# Patient Record
Sex: FEMALE | Race: WHITE | NOT HISPANIC OR LATINO | Employment: UNEMPLOYED | URBAN - METROPOLITAN AREA
[De-identification: names, ages, dates, MRNs, and addresses within clinical notes are randomized per-mention and may not be internally consistent; named-entity substitution may affect disease eponyms.]

---

## 2017-01-03 ENCOUNTER — ALLSCRIPTS OFFICE VISIT (OUTPATIENT)
Dept: OTHER | Facility: OTHER | Age: 32
End: 2017-01-03

## 2017-01-04 ENCOUNTER — ALLSCRIPTS OFFICE VISIT (OUTPATIENT)
Dept: OTHER | Facility: OTHER | Age: 32
End: 2017-01-04

## 2017-01-11 ENCOUNTER — ALLSCRIPTS OFFICE VISIT (OUTPATIENT)
Dept: OTHER | Facility: OTHER | Age: 32
End: 2017-01-11

## 2017-01-17 ENCOUNTER — ALLSCRIPTS OFFICE VISIT (OUTPATIENT)
Dept: OTHER | Facility: OTHER | Age: 32
End: 2017-01-17

## 2017-01-19 ENCOUNTER — ALLSCRIPTS OFFICE VISIT (OUTPATIENT)
Dept: OTHER | Facility: OTHER | Age: 32
End: 2017-01-19

## 2017-01-24 ENCOUNTER — ALLSCRIPTS OFFICE VISIT (OUTPATIENT)
Dept: OTHER | Facility: OTHER | Age: 32
End: 2017-01-24

## 2017-01-25 ENCOUNTER — ALLSCRIPTS OFFICE VISIT (OUTPATIENT)
Dept: OTHER | Facility: OTHER | Age: 32
End: 2017-01-25

## 2017-01-27 ENCOUNTER — ALLSCRIPTS OFFICE VISIT (OUTPATIENT)
Dept: OTHER | Facility: OTHER | Age: 32
End: 2017-01-27

## 2017-01-27 DIAGNOSIS — F31.31 BIPOLAR DISORDER, CURRENT EPISODE DEPRESSED, MILD (HCC): ICD-10-CM

## 2017-01-27 DIAGNOSIS — Z79.899 OTHER LONG TERM (CURRENT) DRUG THERAPY: ICD-10-CM

## 2017-01-30 ENCOUNTER — TRANSCRIBE ORDERS (OUTPATIENT)
Dept: LAB | Facility: MEDICAL CENTER | Age: 32
End: 2017-01-30

## 2017-01-30 ENCOUNTER — APPOINTMENT (OUTPATIENT)
Dept: LAB | Facility: MEDICAL CENTER | Age: 32
End: 2017-01-30
Payer: MEDICARE

## 2017-01-30 ENCOUNTER — GENERIC CONVERSION - ENCOUNTER (OUTPATIENT)
Dept: OTHER | Facility: OTHER | Age: 32
End: 2017-01-30

## 2017-01-30 DIAGNOSIS — F31.31 BIPOLAR DISORDER, CURRENT EPISODE DEPRESSED, MILD (HCC): ICD-10-CM

## 2017-01-30 DIAGNOSIS — Z79.899 OTHER LONG TERM (CURRENT) DRUG THERAPY: ICD-10-CM

## 2017-01-30 LAB
ANION GAP SERPL CALCULATED.3IONS-SCNC: 9 MMOL/L (ref 4–13)
BUN SERPL-MCNC: 10 MG/DL (ref 5–25)
CALCIUM SERPL-MCNC: 9.3 MG/DL (ref 8.3–10.1)
CHLORIDE SERPL-SCNC: 105 MMOL/L (ref 100–108)
CO2 SERPL-SCNC: 24 MMOL/L (ref 21–32)
CREAT SERPL-MCNC: 0.81 MG/DL (ref 0.6–1.3)
GFR SERPL CREATININE-BSD FRML MDRD: >60 ML/MIN/1.73SQ M
GLUCOSE SERPL-MCNC: 98 MG/DL (ref 65–140)
LITHIUM SERPL-SCNC: 0.6 MMOL/L (ref 0.5–1)
POTASSIUM SERPL-SCNC: 4.1 MMOL/L (ref 3.5–5.3)
SODIUM SERPL-SCNC: 138 MMOL/L (ref 136–145)

## 2017-01-30 PROCEDURE — 80048 BASIC METABOLIC PNL TOTAL CA: CPT

## 2017-01-30 PROCEDURE — 80178 ASSAY OF LITHIUM: CPT

## 2017-01-30 PROCEDURE — 36415 COLL VENOUS BLD VENIPUNCTURE: CPT

## 2017-01-31 ENCOUNTER — ALLSCRIPTS OFFICE VISIT (OUTPATIENT)
Dept: OTHER | Facility: OTHER | Age: 32
End: 2017-01-31

## 2017-02-02 ENCOUNTER — ALLSCRIPTS OFFICE VISIT (OUTPATIENT)
Dept: OTHER | Facility: OTHER | Age: 32
End: 2017-02-02

## 2017-02-02 ENCOUNTER — GENERIC CONVERSION - ENCOUNTER (OUTPATIENT)
Dept: OTHER | Facility: OTHER | Age: 32
End: 2017-02-02

## 2017-02-07 ENCOUNTER — ALLSCRIPTS OFFICE VISIT (OUTPATIENT)
Dept: OTHER | Facility: OTHER | Age: 32
End: 2017-02-07

## 2017-02-10 ENCOUNTER — ALLSCRIPTS OFFICE VISIT (OUTPATIENT)
Dept: OTHER | Facility: OTHER | Age: 32
End: 2017-02-10

## 2017-02-13 ENCOUNTER — GENERIC CONVERSION - ENCOUNTER (OUTPATIENT)
Dept: OTHER | Facility: OTHER | Age: 32
End: 2017-02-13

## 2017-02-14 ENCOUNTER — ALLSCRIPTS OFFICE VISIT (OUTPATIENT)
Dept: OTHER | Facility: OTHER | Age: 32
End: 2017-02-14

## 2017-02-16 ENCOUNTER — ALLSCRIPTS OFFICE VISIT (OUTPATIENT)
Dept: OTHER | Facility: OTHER | Age: 32
End: 2017-02-16

## 2017-02-21 ENCOUNTER — ALLSCRIPTS OFFICE VISIT (OUTPATIENT)
Dept: OTHER | Facility: OTHER | Age: 32
End: 2017-02-21

## 2017-02-23 ENCOUNTER — ALLSCRIPTS OFFICE VISIT (OUTPATIENT)
Dept: OTHER | Facility: OTHER | Age: 32
End: 2017-02-23

## 2017-02-24 ENCOUNTER — ALLSCRIPTS OFFICE VISIT (OUTPATIENT)
Dept: FAMILY MEDICINE CLINIC | Facility: CLINIC | Age: 32
End: 2017-02-24
Payer: MEDICARE

## 2017-02-24 LAB — HBA1C MFR BLD HPLC: 5.4 %

## 2017-02-24 PROCEDURE — 99213 OFFICE O/P EST LOW 20 MIN: CPT | Performed by: PHYSICIAN ASSISTANT

## 2017-03-02 ENCOUNTER — ALLSCRIPTS OFFICE VISIT (OUTPATIENT)
Dept: OTHER | Facility: OTHER | Age: 32
End: 2017-03-02

## 2017-03-03 ENCOUNTER — ALLSCRIPTS OFFICE VISIT (OUTPATIENT)
Dept: OTHER | Facility: OTHER | Age: 32
End: 2017-03-03

## 2017-03-07 ENCOUNTER — ALLSCRIPTS OFFICE VISIT (OUTPATIENT)
Dept: OTHER | Facility: OTHER | Age: 32
End: 2017-03-07

## 2017-03-08 ENCOUNTER — ALLSCRIPTS OFFICE VISIT (OUTPATIENT)
Dept: OTHER | Facility: OTHER | Age: 32
End: 2017-03-08

## 2017-03-10 DIAGNOSIS — Z79.899 OTHER LONG TERM (CURRENT) DRUG THERAPY: ICD-10-CM

## 2017-03-10 DIAGNOSIS — F31.62 BIPOLAR DISORDER, CURRENT EPISODE MIXED, MODERATE (HCC): ICD-10-CM

## 2017-03-10 DIAGNOSIS — R05.9 COUGH: ICD-10-CM

## 2017-03-13 ENCOUNTER — APPOINTMENT (OUTPATIENT)
Dept: LAB | Facility: MEDICAL CENTER | Age: 32
End: 2017-03-13
Payer: MEDICARE

## 2017-03-13 ENCOUNTER — TRANSCRIBE ORDERS (OUTPATIENT)
Dept: LAB | Facility: MEDICAL CENTER | Age: 32
End: 2017-03-13

## 2017-03-13 DIAGNOSIS — F31.62 BIPOLAR DISORDER, CURRENT EPISODE MIXED, MODERATE (HCC): ICD-10-CM

## 2017-03-13 DIAGNOSIS — Z79.899 OTHER LONG TERM (CURRENT) DRUG THERAPY: ICD-10-CM

## 2017-03-13 LAB — LITHIUM SERPL-SCNC: 0.6 MMOL/L (ref 0.5–1)

## 2017-03-13 PROCEDURE — 80178 ASSAY OF LITHIUM: CPT

## 2017-03-13 PROCEDURE — 36415 COLL VENOUS BLD VENIPUNCTURE: CPT

## 2017-03-16 ENCOUNTER — ALLSCRIPTS OFFICE VISIT (OUTPATIENT)
Dept: OTHER | Facility: OTHER | Age: 32
End: 2017-03-16

## 2017-03-21 ENCOUNTER — ALLSCRIPTS OFFICE VISIT (OUTPATIENT)
Dept: OTHER | Facility: OTHER | Age: 32
End: 2017-03-21

## 2017-03-23 ENCOUNTER — ALLSCRIPTS OFFICE VISIT (OUTPATIENT)
Dept: OTHER | Facility: OTHER | Age: 32
End: 2017-03-23

## 2017-03-28 ENCOUNTER — ALLSCRIPTS OFFICE VISIT (OUTPATIENT)
Dept: OTHER | Facility: OTHER | Age: 32
End: 2017-03-28

## 2017-03-28 ENCOUNTER — OFFICE VISIT (OUTPATIENT)
Dept: URGENT CARE | Facility: CLINIC | Age: 32
End: 2017-03-28
Payer: MEDICARE

## 2017-03-28 PROCEDURE — 81002 URINALYSIS NONAUTO W/O SCOPE: CPT

## 2017-03-28 PROCEDURE — G0463 HOSPITAL OUTPT CLINIC VISIT: HCPCS

## 2017-03-28 PROCEDURE — 99214 OFFICE O/P EST MOD 30 MIN: CPT

## 2017-03-30 ENCOUNTER — ALLSCRIPTS OFFICE VISIT (OUTPATIENT)
Dept: OTHER | Facility: OTHER | Age: 32
End: 2017-03-30

## 2017-04-04 ENCOUNTER — ALLSCRIPTS OFFICE VISIT (OUTPATIENT)
Dept: OTHER | Facility: OTHER | Age: 32
End: 2017-04-04

## 2017-04-05 ENCOUNTER — OFFICE VISIT (OUTPATIENT)
Dept: URGENT CARE | Facility: CLINIC | Age: 32
End: 2017-04-05
Payer: MEDICARE

## 2017-04-05 ENCOUNTER — HOSPITAL ENCOUNTER (OUTPATIENT)
Dept: RADIOLOGY | Facility: CLINIC | Age: 32
Discharge: HOME | End: 2017-04-05
Admitting: EMERGENCY MEDICINE
Payer: MEDICARE

## 2017-04-05 DIAGNOSIS — R05.9 COUGH: ICD-10-CM

## 2017-04-05 PROCEDURE — G0463 HOSPITAL OUTPT CLINIC VISIT: HCPCS

## 2017-04-05 PROCEDURE — 99213 OFFICE O/P EST LOW 20 MIN: CPT

## 2017-04-05 PROCEDURE — 71020 HB CHEST X-RAY 2VW FRONTAL&LATL: CPT

## 2017-04-10 ENCOUNTER — ALLSCRIPTS OFFICE VISIT (OUTPATIENT)
Dept: OTHER | Facility: OTHER | Age: 32
End: 2017-04-10

## 2017-04-11 ENCOUNTER — ALLSCRIPTS OFFICE VISIT (OUTPATIENT)
Dept: OTHER | Facility: OTHER | Age: 32
End: 2017-04-11

## 2017-04-17 ENCOUNTER — GENERIC CONVERSION - ENCOUNTER (OUTPATIENT)
Dept: PSYCHIATRY | Facility: CLINIC | Age: 32
End: 2017-04-17

## 2017-04-18 ENCOUNTER — ALLSCRIPTS OFFICE VISIT (OUTPATIENT)
Dept: OTHER | Facility: OTHER | Age: 32
End: 2017-04-18

## 2017-04-20 ENCOUNTER — ALLSCRIPTS OFFICE VISIT (OUTPATIENT)
Dept: OTHER | Facility: OTHER | Age: 32
End: 2017-04-20

## 2017-04-25 ENCOUNTER — ALLSCRIPTS OFFICE VISIT (OUTPATIENT)
Dept: OTHER | Facility: OTHER | Age: 32
End: 2017-04-25

## 2017-04-26 ENCOUNTER — GENERIC CONVERSION - ENCOUNTER (OUTPATIENT)
Dept: OTHER | Facility: OTHER | Age: 32
End: 2017-04-26

## 2017-04-28 ENCOUNTER — ALLSCRIPTS OFFICE VISIT (OUTPATIENT)
Dept: OTHER | Facility: OTHER | Age: 32
End: 2017-04-28

## 2017-05-02 ENCOUNTER — ALLSCRIPTS OFFICE VISIT (OUTPATIENT)
Dept: OTHER | Facility: OTHER | Age: 32
End: 2017-05-02

## 2017-05-05 ENCOUNTER — ALLSCRIPTS OFFICE VISIT (OUTPATIENT)
Dept: OTHER | Facility: OTHER | Age: 32
End: 2017-05-05

## 2017-05-09 ENCOUNTER — ALLSCRIPTS OFFICE VISIT (OUTPATIENT)
Dept: OTHER | Facility: OTHER | Age: 32
End: 2017-05-09

## 2017-05-10 ENCOUNTER — GENERIC CONVERSION - ENCOUNTER (OUTPATIENT)
Dept: OTHER | Facility: OTHER | Age: 32
End: 2017-05-10

## 2017-05-10 ENCOUNTER — ALLSCRIPTS OFFICE VISIT (OUTPATIENT)
Dept: OTHER | Facility: OTHER | Age: 32
End: 2017-05-10

## 2017-05-11 ENCOUNTER — OFFICE VISIT (OUTPATIENT)
Dept: URGENT CARE | Facility: CLINIC | Age: 32
End: 2017-05-11
Payer: MEDICARE

## 2017-05-11 PROCEDURE — G0463 HOSPITAL OUTPT CLINIC VISIT: HCPCS

## 2017-05-11 PROCEDURE — 99214 OFFICE O/P EST MOD 30 MIN: CPT

## 2017-05-12 ENCOUNTER — GENERIC CONVERSION - ENCOUNTER (OUTPATIENT)
Dept: OTHER | Facility: OTHER | Age: 32
End: 2017-05-12

## 2017-05-16 ENCOUNTER — ALLSCRIPTS OFFICE VISIT (OUTPATIENT)
Dept: OTHER | Facility: OTHER | Age: 32
End: 2017-05-16

## 2017-05-18 ENCOUNTER — ALLSCRIPTS OFFICE VISIT (OUTPATIENT)
Dept: OTHER | Facility: OTHER | Age: 32
End: 2017-05-18

## 2017-05-22 ENCOUNTER — GENERIC CONVERSION - ENCOUNTER (OUTPATIENT)
Dept: OTHER | Facility: OTHER | Age: 32
End: 2017-05-22

## 2017-05-23 ENCOUNTER — ALLSCRIPTS OFFICE VISIT (OUTPATIENT)
Dept: OTHER | Facility: OTHER | Age: 32
End: 2017-05-23

## 2017-05-24 ENCOUNTER — ALLSCRIPTS OFFICE VISIT (OUTPATIENT)
Dept: OTHER | Facility: OTHER | Age: 32
End: 2017-05-24

## 2017-05-25 ENCOUNTER — ALLSCRIPTS OFFICE VISIT (OUTPATIENT)
Dept: OTHER | Facility: OTHER | Age: 32
End: 2017-05-25

## 2017-05-30 ENCOUNTER — ALLSCRIPTS OFFICE VISIT (OUTPATIENT)
Dept: OTHER | Facility: OTHER | Age: 32
End: 2017-05-30

## 2017-06-01 ENCOUNTER — ALLSCRIPTS OFFICE VISIT (OUTPATIENT)
Dept: OTHER | Facility: OTHER | Age: 32
End: 2017-06-01

## 2017-06-06 ENCOUNTER — ALLSCRIPTS OFFICE VISIT (OUTPATIENT)
Dept: OTHER | Facility: OTHER | Age: 32
End: 2017-06-06

## 2017-06-08 ENCOUNTER — ALLSCRIPTS OFFICE VISIT (OUTPATIENT)
Dept: OTHER | Facility: OTHER | Age: 32
End: 2017-06-08

## 2017-06-12 ENCOUNTER — ALLSCRIPTS OFFICE VISIT (OUTPATIENT)
Dept: OTHER | Facility: OTHER | Age: 32
End: 2017-06-12

## 2017-06-13 ENCOUNTER — ALLSCRIPTS OFFICE VISIT (OUTPATIENT)
Dept: OTHER | Facility: OTHER | Age: 32
End: 2017-06-13

## 2017-06-15 ENCOUNTER — ALLSCRIPTS OFFICE VISIT (OUTPATIENT)
Dept: OTHER | Facility: OTHER | Age: 32
End: 2017-06-15

## 2017-06-20 ENCOUNTER — ALLSCRIPTS OFFICE VISIT (OUTPATIENT)
Dept: OTHER | Facility: OTHER | Age: 32
End: 2017-06-20

## 2017-06-22 ENCOUNTER — ALLSCRIPTS OFFICE VISIT (OUTPATIENT)
Dept: OTHER | Facility: OTHER | Age: 32
End: 2017-06-22

## 2017-06-26 ENCOUNTER — ALLSCRIPTS OFFICE VISIT (OUTPATIENT)
Dept: FAMILY MEDICINE CLINIC | Facility: CLINIC | Age: 32
End: 2017-06-26
Payer: MEDICARE

## 2017-06-26 LAB — HBA1C MFR BLD HPLC: 5.3 %

## 2017-06-26 PROCEDURE — 99213 OFFICE O/P EST LOW 20 MIN: CPT | Performed by: PHYSICIAN ASSISTANT

## 2017-06-26 PROCEDURE — 83036 HEMOGLOBIN GLYCOSYLATED A1C: CPT | Performed by: PHYSICIAN ASSISTANT

## 2017-06-27 ENCOUNTER — ALLSCRIPTS OFFICE VISIT (OUTPATIENT)
Dept: OTHER | Facility: OTHER | Age: 32
End: 2017-06-27

## 2017-07-06 ENCOUNTER — ALLSCRIPTS OFFICE VISIT (OUTPATIENT)
Dept: OTHER | Facility: OTHER | Age: 32
End: 2017-07-06

## 2017-07-07 ENCOUNTER — GENERIC CONVERSION - ENCOUNTER (OUTPATIENT)
Dept: OTHER | Facility: OTHER | Age: 32
End: 2017-07-07

## 2017-07-10 ENCOUNTER — GENERIC CONVERSION - ENCOUNTER (OUTPATIENT)
Dept: OTHER | Facility: OTHER | Age: 32
End: 2017-07-10

## 2017-07-11 ENCOUNTER — ALLSCRIPTS OFFICE VISIT (OUTPATIENT)
Dept: OTHER | Facility: OTHER | Age: 32
End: 2017-07-11

## 2017-07-13 ENCOUNTER — ALLSCRIPTS OFFICE VISIT (OUTPATIENT)
Dept: OTHER | Facility: OTHER | Age: 32
End: 2017-07-13

## 2017-07-18 ENCOUNTER — ALLSCRIPTS OFFICE VISIT (OUTPATIENT)
Dept: OTHER | Facility: OTHER | Age: 32
End: 2017-07-18

## 2017-07-20 ENCOUNTER — ALLSCRIPTS OFFICE VISIT (OUTPATIENT)
Dept: OTHER | Facility: OTHER | Age: 32
End: 2017-07-20

## 2017-07-21 ENCOUNTER — ALLSCRIPTS OFFICE VISIT (OUTPATIENT)
Dept: OTHER | Facility: OTHER | Age: 32
End: 2017-07-21

## 2017-07-25 ENCOUNTER — ALLSCRIPTS OFFICE VISIT (OUTPATIENT)
Dept: OTHER | Facility: OTHER | Age: 32
End: 2017-07-25

## 2017-07-27 ENCOUNTER — GENERIC CONVERSION - ENCOUNTER (OUTPATIENT)
Dept: OTHER | Facility: OTHER | Age: 32
End: 2017-07-27

## 2017-07-27 ENCOUNTER — ALLSCRIPTS OFFICE VISIT (OUTPATIENT)
Dept: OTHER | Facility: OTHER | Age: 32
End: 2017-07-27

## 2017-07-27 DIAGNOSIS — F31.32 BIPOLAR DISORDER, CURRENT EPISODE DEPRESSED, MODERATE (HCC): ICD-10-CM

## 2017-08-01 ENCOUNTER — ALLSCRIPTS OFFICE VISIT (OUTPATIENT)
Dept: OTHER | Facility: OTHER | Age: 32
End: 2017-08-01

## 2017-08-02 DIAGNOSIS — E55.9 VITAMIN D DEFICIENCY: ICD-10-CM

## 2017-08-02 DIAGNOSIS — N39.0 URINARY TRACT INFECTION: ICD-10-CM

## 2017-08-02 DIAGNOSIS — R73.09 OTHER ABNORMAL GLUCOSE: ICD-10-CM

## 2017-08-02 DIAGNOSIS — I10 ESSENTIAL (PRIMARY) HYPERTENSION: ICD-10-CM

## 2017-08-03 ENCOUNTER — APPOINTMENT (OUTPATIENT)
Dept: LAB | Facility: MEDICAL CENTER | Age: 32
End: 2017-08-03
Payer: MEDICARE

## 2017-08-03 ENCOUNTER — TRANSCRIBE ORDERS (OUTPATIENT)
Dept: LAB | Facility: MEDICAL CENTER | Age: 32
End: 2017-08-03

## 2017-08-03 DIAGNOSIS — F31.32 BIPOLAR DISORDER, CURRENT EPISODE DEPRESSED, MODERATE (HCC): ICD-10-CM

## 2017-08-03 DIAGNOSIS — E55.9 VITAMIN D DEFICIENCY: ICD-10-CM

## 2017-08-03 DIAGNOSIS — R73.09 OTHER ABNORMAL GLUCOSE: Primary | ICD-10-CM

## 2017-08-03 DIAGNOSIS — I10 ESSENTIAL (PRIMARY) HYPERTENSION: ICD-10-CM

## 2017-08-03 LAB
25(OH)D3 SERPL-MCNC: 42.3 NG/ML (ref 30–100)
ALBUMIN SERPL BCP-MCNC: 3.5 G/DL (ref 3.5–5)
ALP SERPL-CCNC: 78 U/L (ref 46–116)
ALT SERPL W P-5'-P-CCNC: 23 U/L (ref 12–78)
ANION GAP SERPL CALCULATED.3IONS-SCNC: 6 MMOL/L (ref 4–13)
AST SERPL W P-5'-P-CCNC: 14 U/L (ref 5–45)
BACTERIA UR QL AUTO: ABNORMAL /HPF
BASOPHILS # BLD AUTO: 0.04 THOUSANDS/ΜL (ref 0–0.1)
BASOPHILS NFR BLD AUTO: 0 % (ref 0–1)
BILIRUB SERPL-MCNC: 0.29 MG/DL (ref 0.2–1)
BILIRUB UR QL STRIP: NEGATIVE
BUN SERPL-MCNC: 13 MG/DL (ref 5–25)
CALCIUM SERPL-MCNC: 9.5 MG/DL (ref 8.3–10.1)
CHLORIDE SERPL-SCNC: 107 MMOL/L (ref 100–108)
CHOLEST SERPL-MCNC: 157 MG/DL (ref 50–200)
CLARITY UR: CLEAR
CO2 SERPL-SCNC: 26 MMOL/L (ref 21–32)
COLOR UR: YELLOW
CREAT SERPL-MCNC: 0.92 MG/DL (ref 0.6–1.3)
EOSINOPHIL # BLD AUTO: 0.54 THOUSAND/ΜL (ref 0–0.61)
EOSINOPHIL NFR BLD AUTO: 5 % (ref 0–6)
ERYTHROCYTE [DISTWIDTH] IN BLOOD BY AUTOMATED COUNT: 15 % (ref 11.6–15.1)
GFR SERPL CREATININE-BSD FRML MDRD: 83 ML/MIN/1.73SQ M
GLUCOSE P FAST SERPL-MCNC: 102 MG/DL (ref 65–99)
GLUCOSE UR STRIP-MCNC: NEGATIVE MG/DL
HCT VFR BLD AUTO: 39.8 % (ref 34.8–46.1)
HDLC SERPL-MCNC: 48 MG/DL (ref 40–60)
HGB BLD-MCNC: 12.2 G/DL (ref 11.5–15.4)
HGB UR QL STRIP.AUTO: NEGATIVE
HYALINE CASTS #/AREA URNS LPF: ABNORMAL /LPF
INSULIN SERPL-ACNC: 22.5 MU/L (ref 3–25)
KETONES UR STRIP-MCNC: NEGATIVE MG/DL
LDLC SERPL CALC-MCNC: 84 MG/DL (ref 0–100)
LEUKOCYTE ESTERASE UR QL STRIP: ABNORMAL
LITHIUM SERPL-SCNC: 0.6 MMOL/L (ref 0.5–1)
LYMPHOCYTES # BLD AUTO: 3.03 THOUSANDS/ΜL (ref 0.6–4.47)
LYMPHOCYTES NFR BLD AUTO: 25 % (ref 14–44)
MCH RBC QN AUTO: 26.6 PG (ref 26.8–34.3)
MCHC RBC AUTO-ENTMCNC: 30.7 G/DL (ref 31.4–37.4)
MCV RBC AUTO: 87 FL (ref 82–98)
MONOCYTES # BLD AUTO: 0.74 THOUSAND/ΜL (ref 0.17–1.22)
MONOCYTES NFR BLD AUTO: 6 % (ref 4–12)
NEUTROPHILS # BLD AUTO: 7.71 THOUSANDS/ΜL (ref 1.85–7.62)
NEUTS SEG NFR BLD AUTO: 64 % (ref 43–75)
NITRITE UR QL STRIP: NEGATIVE
NON-SQ EPI CELLS URNS QL MICRO: ABNORMAL /HPF
NRBC BLD AUTO-RTO: 0 /100 WBCS
PH UR STRIP.AUTO: 6.5 [PH] (ref 4.5–8)
PLATELET # BLD AUTO: 360 THOUSANDS/UL (ref 149–390)
PMV BLD AUTO: 10.1 FL (ref 8.9–12.7)
POTASSIUM SERPL-SCNC: 4 MMOL/L (ref 3.5–5.3)
PROT SERPL-MCNC: 7 G/DL (ref 6.4–8.2)
PROT UR STRIP-MCNC: NEGATIVE MG/DL
RBC # BLD AUTO: 4.59 MILLION/UL (ref 3.81–5.12)
RBC #/AREA URNS AUTO: ABNORMAL /HPF
SODIUM SERPL-SCNC: 139 MMOL/L (ref 136–145)
SP GR UR STRIP.AUTO: 1.02 (ref 1–1.03)
T3 SERPL-MCNC: 1 NG/ML (ref 0.6–1.8)
T4 FREE SERPL-MCNC: 1 NG/DL (ref 0.76–1.46)
TRIGL SERPL-MCNC: 123 MG/DL
TSH SERPL DL<=0.05 MIU/L-ACNC: 3.5 UIU/ML (ref 0.36–3.74)
UROBILINOGEN UR QL STRIP.AUTO: 0.2 E.U./DL
WBC # BLD AUTO: 12.1 THOUSAND/UL (ref 4.31–10.16)
WBC #/AREA URNS AUTO: ABNORMAL /HPF

## 2017-08-03 PROCEDURE — 84480 ASSAY TRIIODOTHYRONINE (T3): CPT

## 2017-08-03 PROCEDURE — 84443 ASSAY THYROID STIM HORMONE: CPT

## 2017-08-03 PROCEDURE — 83525 ASSAY OF INSULIN: CPT

## 2017-08-03 PROCEDURE — 80053 COMPREHEN METABOLIC PANEL: CPT

## 2017-08-03 PROCEDURE — 81001 URINALYSIS AUTO W/SCOPE: CPT

## 2017-08-03 PROCEDURE — 80178 ASSAY OF LITHIUM: CPT

## 2017-08-03 PROCEDURE — 80061 LIPID PANEL: CPT

## 2017-08-03 PROCEDURE — 85025 COMPLETE CBC W/AUTO DIFF WBC: CPT

## 2017-08-03 PROCEDURE — 84439 ASSAY OF FREE THYROXINE: CPT

## 2017-08-03 PROCEDURE — 82306 VITAMIN D 25 HYDROXY: CPT

## 2017-08-03 PROCEDURE — 36415 COLL VENOUS BLD VENIPUNCTURE: CPT

## 2017-08-04 ENCOUNTER — ALLSCRIPTS OFFICE VISIT (OUTPATIENT)
Dept: OTHER | Facility: OTHER | Age: 32
End: 2017-08-04

## 2017-08-08 ENCOUNTER — ALLSCRIPTS OFFICE VISIT (OUTPATIENT)
Dept: OTHER | Facility: OTHER | Age: 32
End: 2017-08-08

## 2017-08-10 ENCOUNTER — ALLSCRIPTS OFFICE VISIT (OUTPATIENT)
Dept: OTHER | Facility: OTHER | Age: 32
End: 2017-08-10

## 2017-08-15 ENCOUNTER — ALLSCRIPTS OFFICE VISIT (OUTPATIENT)
Dept: OTHER | Facility: OTHER | Age: 32
End: 2017-08-15

## 2017-08-15 ENCOUNTER — APPOINTMENT (OUTPATIENT)
Dept: LAB | Facility: HOSPITAL | Age: 32
End: 2017-08-15
Payer: MEDICARE

## 2017-08-15 DIAGNOSIS — N39.0 URINARY TRACT INFECTION: ICD-10-CM

## 2017-08-15 LAB
BILIRUB UR QL STRIP: NEGATIVE
CLARITY UR: NORMAL
COLOR UR: YELLOW
GLUCOSE UR STRIP-MCNC: NEGATIVE MG/DL
HGB UR QL STRIP.AUTO: NEGATIVE
KETONES UR STRIP-MCNC: NEGATIVE MG/DL
LEUKOCYTE ESTERASE UR QL STRIP: NEGATIVE
NITRITE UR QL STRIP: NEGATIVE
PH UR STRIP.AUTO: 5.5 [PH] (ref 4.5–8)
PROT UR STRIP-MCNC: NEGATIVE MG/DL
SP GR UR STRIP.AUTO: 1.02 (ref 1–1.03)
UROBILINOGEN UR QL STRIP.AUTO: 0.2 E.U./DL

## 2017-08-15 PROCEDURE — 81003 URINALYSIS AUTO W/O SCOPE: CPT

## 2017-08-16 ENCOUNTER — ALLSCRIPTS OFFICE VISIT (OUTPATIENT)
Dept: FAMILY MEDICINE CLINIC | Facility: CLINIC | Age: 32
End: 2017-08-16
Payer: MEDICARE

## 2017-08-16 PROCEDURE — 99213 OFFICE O/P EST LOW 20 MIN: CPT | Performed by: FAMILY MEDICINE

## 2017-08-18 ENCOUNTER — ALLSCRIPTS OFFICE VISIT (OUTPATIENT)
Dept: OTHER | Facility: OTHER | Age: 32
End: 2017-08-18

## 2017-08-22 ENCOUNTER — ALLSCRIPTS OFFICE VISIT (OUTPATIENT)
Dept: OTHER | Facility: OTHER | Age: 32
End: 2017-08-22

## 2017-08-24 ENCOUNTER — ALLSCRIPTS OFFICE VISIT (OUTPATIENT)
Dept: OTHER | Facility: OTHER | Age: 32
End: 2017-08-24

## 2017-08-29 ENCOUNTER — ALLSCRIPTS OFFICE VISIT (OUTPATIENT)
Dept: OTHER | Facility: OTHER | Age: 32
End: 2017-08-29

## 2017-08-30 ENCOUNTER — ALLSCRIPTS OFFICE VISIT (OUTPATIENT)
Dept: FAMILY MEDICINE CLINIC | Facility: CLINIC | Age: 32
End: 2017-08-30
Payer: MEDICARE

## 2017-08-30 ENCOUNTER — GENERIC CONVERSION - ENCOUNTER (OUTPATIENT)
Dept: OTHER | Facility: OTHER | Age: 32
End: 2017-08-30

## 2017-08-31 ENCOUNTER — GENERIC CONVERSION - ENCOUNTER (OUTPATIENT)
Dept: OTHER | Facility: OTHER | Age: 32
End: 2017-08-31

## 2017-08-31 ENCOUNTER — ALLSCRIPTS OFFICE VISIT (OUTPATIENT)
Dept: OTHER | Facility: OTHER | Age: 32
End: 2017-08-31

## 2017-09-05 ENCOUNTER — ALLSCRIPTS OFFICE VISIT (OUTPATIENT)
Dept: OTHER | Facility: OTHER | Age: 32
End: 2017-09-05

## 2017-09-07 ENCOUNTER — ALLSCRIPTS OFFICE VISIT (OUTPATIENT)
Dept: OTHER | Facility: OTHER | Age: 32
End: 2017-09-07

## 2017-09-11 ENCOUNTER — ALLSCRIPTS OFFICE VISIT (OUTPATIENT)
Dept: OTHER | Facility: OTHER | Age: 32
End: 2017-09-11

## 2017-09-12 ENCOUNTER — ALLSCRIPTS OFFICE VISIT (OUTPATIENT)
Dept: OTHER | Facility: OTHER | Age: 32
End: 2017-09-12

## 2017-09-14 ENCOUNTER — ALLSCRIPTS OFFICE VISIT (OUTPATIENT)
Dept: OTHER | Facility: OTHER | Age: 32
End: 2017-09-14

## 2017-09-19 ENCOUNTER — GENERIC CONVERSION - ENCOUNTER (OUTPATIENT)
Dept: OTHER | Facility: OTHER | Age: 32
End: 2017-09-19

## 2017-09-22 ENCOUNTER — ALLSCRIPTS OFFICE VISIT (OUTPATIENT)
Dept: OTHER | Facility: OTHER | Age: 32
End: 2017-09-22

## 2017-09-26 ENCOUNTER — ALLSCRIPTS OFFICE VISIT (OUTPATIENT)
Dept: OTHER | Facility: OTHER | Age: 32
End: 2017-09-26

## 2017-10-03 ENCOUNTER — ALLSCRIPTS OFFICE VISIT (OUTPATIENT)
Dept: OTHER | Facility: OTHER | Age: 32
End: 2017-10-03

## 2017-10-05 ENCOUNTER — ALLSCRIPTS OFFICE VISIT (OUTPATIENT)
Dept: OTHER | Facility: OTHER | Age: 32
End: 2017-10-05

## 2017-10-12 ENCOUNTER — ALLSCRIPTS OFFICE VISIT (OUTPATIENT)
Dept: OTHER | Facility: OTHER | Age: 32
End: 2017-10-12

## 2017-10-17 ENCOUNTER — ALLSCRIPTS OFFICE VISIT (OUTPATIENT)
Dept: OTHER | Facility: OTHER | Age: 32
End: 2017-10-17

## 2017-10-19 ENCOUNTER — GENERIC CONVERSION - ENCOUNTER (OUTPATIENT)
Dept: OTHER | Facility: OTHER | Age: 32
End: 2017-10-19

## 2017-10-19 ENCOUNTER — ALLSCRIPTS OFFICE VISIT (OUTPATIENT)
Dept: OTHER | Facility: OTHER | Age: 32
End: 2017-10-19

## 2017-10-24 ENCOUNTER — ALLSCRIPTS OFFICE VISIT (OUTPATIENT)
Dept: OTHER | Facility: OTHER | Age: 32
End: 2017-10-24

## 2017-10-26 ENCOUNTER — ALLSCRIPTS OFFICE VISIT (OUTPATIENT)
Dept: OTHER | Facility: OTHER | Age: 32
End: 2017-10-26

## 2017-10-30 ENCOUNTER — ALLSCRIPTS OFFICE VISIT (OUTPATIENT)
Dept: FAMILY MEDICINE CLINIC | Facility: CLINIC | Age: 32
End: 2017-10-30
Payer: MEDICARE

## 2017-10-30 LAB — HBA1C MFR BLD HPLC: 5.5 %

## 2017-10-30 PROCEDURE — 99213 OFFICE O/P EST LOW 20 MIN: CPT | Performed by: PHYSICIAN ASSISTANT

## 2017-10-31 ENCOUNTER — ALLSCRIPTS OFFICE VISIT (OUTPATIENT)
Dept: OTHER | Facility: OTHER | Age: 32
End: 2017-10-31

## 2017-10-31 NOTE — PROGRESS NOTES
Assessment  1  Other abnormal glucose (790 29) (R73 09)   2  Need for immunization against influenza (V04 81) (Z23)   3  Morbid or severe obesity due to excess calories (278 01) (E66 01)    Plan  Long term current use of therapeutic drug    · Hemoglobin A1c- POC; Status:Complete - Retrospective Authorization;   Done:  16SZF4326 09:07AM  Morbid or severe obesity due to excess calories    · Follow-up visit in 4 Months Evaluation and Treatment  Follow-up  Status: Hold For -  Scheduling  Requested for: 28LLZ5656  Need for immunization against influenza    · Stop: Influenza  Other abnormal glucose    · Hemoglobin A1c- POC; Status:Canceled;    · Hemoglobin A1c- POC; Status:Canceled; Discussion/Summary    Discussed that weight loss meds are not appropriate at this time  Psych issues must be resolved first  HGBA1C is 5 5% today  4 months or sooner if needed  The treatment plan was reviewed with the patient/guardian  The patient/guardian understands and agrees with the treatment plan      Chief Complaint  Patient is here for regular check up and would like to discuss alternative weight loss medications  History of Present Illness  33 yo female presents for above  She has been very depressed and started [address] instead of ECT treatments  She reports her OCD has been out of control  now has a new Psych MD  He has adjusted her meds  is also interested in a different weight loss med  Review of Systems    Constitutional: No fever, no chills, feels well, no tiredness, no recent weight gain or weight loss  Eyes: No complaints of eye pain, no red eyes, no eyesight problems, no discharge, no dry eyes, no itching of eyes  ENT: no complaints of earache, no loss of hearing, no nose bleeds, no nasal discharge, no sore throat, no hoarseness  Cardiovascular: No complaints of slow heart rate, no fast heart rate, no chest pain, no palpitations, no leg claudication, no lower extremity edema     Respiratory: No complaints of shortness of breath, no wheezing, no cough, no SOB on exertion, no orthopnea, no PND  Psychiatric: as noted in HPI  Active Problems  1  Abnormal weight gain (783 1) (R63 5)   2  Allergic rhinitis (477 9) (J30 9)   3  Amenorrhea (626 0) (N91 2)   4  Binge-eating disorder, severe (783 6) (F50 81)   5  Bulging lumbar disc (722 10) (M51 26)   6  Dysuria (788 1) (R30 0)   7  Esophageal reflux (530 81) (K21 9)   8  Foot pain (729 5) (M79 673)   9  EILEEN (generalized anxiety disorder) (300 02) (F41 1)   10  Gambling disorder, moderate (312 31) (F63 0)   11  Hypertension (401 9) (I10)   12  Hypothyroidism (244 9) (E03 9)   13  Long term current use of therapeutic drug (V58 69) (Z79 899)   14  Lumbago (724 2) (M54 5)   15  Lumbar degenerative disc disease (722 52) (M51 36)   16  Morbid or severe obesity due to excess calories (278 01) (E66 01)   17  Nocturnal enuresis (788 36) (N39 44)   18  Obsessive-compulsive disorder (300 3) (F42 9)   19  Other abnormal glucose (790 29) (R73 09)   20  Overactive bladder (596 51) (N32 81)   21  PTSD (post-traumatic stress disorder) (309 81) (F43 10)   22  RLS (restless legs syndrome) (333 94) (G25 81)   23  Severe bipolar I disorder, most recent episode depressed (296 53) (F31 4)   24  Urgency incontinence (788 31) (N39 41)   25  Urinary incontinence, unspecified type (788 30) (R32)   26  Urinary tract infection, site unspecified (599 0) (N39 0)   27  Vitamin D deficiency (268 9) (E55 9)    Past Medical History  1  History of Abscess of face (682 0) (L02 01)   2  History of Acute diverticulitis (562 11) (K57 92)   3  History of Acute frontal sinusitis (461 1) (J01 10)   4  History of Acute nonsuppurative otitis media, unspecified laterality (381 00) (H65 199)   5  History of Acute pain (338 19) (R52)   6  History of Anorexia nervosa in remission (307 1) (F50 00)   7  History of Backache (724 5) (M54 9)   8  History of Candidiasis, cutaneous (112 3) (B37 2)   9   History of Dog bite (879 8,E906 0) (W54  0XXA)   10  History of Dysfunction of left eustachian tube (381 81) (H69 82)   11  History of Dysuria (788 1) (R30 0)   12  History of acute bronchitis (V12 69) (Z87 09)   13  History of acute sinusitis (V12 69) (Z87 09)   14  History of bronchitis (V12 69) (Z87 09)   15  History of cough   16  Denied: History of head injury   17  History of Pseudotumor cerebri (348 2) (G93 2)   18  Denied: History of Seizures   19  History of Suicide and self-inflicted injury (C158 8) (R13 0IUI)    The active problems and past medical history were reviewed and updated today  Surgical History  1  History of Sinus Surgery   2  History of Tonsillectomy    The surgical history was reviewed and updated today  Family History  Mother    1  Family history of bicuspid aortic valve (V17 49) (Z82 79)   2  Family history of depression (V17 0) (Z81 8)   3  Family history of Hypertension (V17 49)   4  Family history of Status post aortic valve replacement  Father    5  Family history of Hypertension (V17 49)   6  Family history of Hypothyroidism   7  No family history of mental disorder  Brother    8  Family history of Hypertension (V17 49)  Maternal Grandmother    9  Family history of Cancer   10  Family history of cerebrovascular accident (V17 1) (Z82 3)   11  Family history of Heart Disease (V17 49)  Paternal Grandmother    15  Family history of Cancer   13  Family history of malignant neoplasm (V16 9) (Z80 9)  Maternal Grandfather    15  Family history of pneumonia (V18 8) (Z83 1)  Paternal Grandfather    13  Family history of pneumonia (V18 8) (Z83 1)  Family History    12  Family history of Arthritis (716 90) (M19 90)   17  Family history of Breast Cancer (V16 3)   18  Family history of osteoporosis (V17 81) (Z82 62)   19  Family history of Hypertension (V17 49)   20  Family history of Transient Ischemic Attack    The family history was reviewed and updated today         Social History   · Caffeine Use   · Currently sexually active   · Disabled   · Former smoker (V62 79) (H37 623)   · Has no children   · High school graduate   · Lives with parents   · Never smoked   · No alcohol use   · Single   · Tea  The social history was reviewed and updated today  The social history was reviewed and is unchanged  Current Meds   1  HydrOXYzine HCl - 50 MG Oral Tablet; TAKE 1 1/2 TO 2 TABLETS AT BEDTIME AS   NEEDED FOR INSOMNIA; Therapy: 72TCA1651 to (Evaluate:12Aug2017)  Requested for: 07NWS6858; Last   Rx:13Jun2017 Ordered   2  LamoTRIgine 200 MG Oral Tablet; TAKE 2 TABLETS AT BEDTIME; Therapy: 10YYM9777 to ([phone])  Requested for: 59Sax2866; Last   Rx:85Vsq5791 Ordered   3  Latuda 120 MG Oral Tablet; TAKE 1 TABLET IN THE EVENING WITH FOOD; Therapy: 85Exq9504 to [phone])  Requested for: 20KET1316; Last   Rx:13Xip5970 Ordered   4  Levocetirizine Dihydrochloride 5 MG Oral Tablet; TAKE 1 TABLET BY MOUTH ONCE A   DAY; Therapy: 13Fdz5128 to (Evaluate:16Nov2017)  Requested for: 03Bsu6930; Last   Rx:15Jql0233 Ordered   5  Levothyroxine Sodium 100 MCG Oral Tablet; take 1 tablet by mouth every day; Therapy: 84VGG3665 to (Kathryn Mendoza)  Requested for: 44Wag0244; Last   Rx:19Bco2716 Ordered   6  Lisinopril 10 MG Oral Tablet; TAKE 1 TABLET BY MOUTH EVERY DAY FOR BLOOD   PRESSURE; Therapy: 66HBJ7514 to ([phone])  Requested for: 28Bye5246; Last   Rx:76Zty1080 Ordered   7  Lithium Carbonate  MG Oral Tablet Extended Release; take 2 tablets at bedtime; Therapy: 24GTY9104 to (Evaluate:09Jan2018)  Requested for: 31Twh3984; Last   Rx:21Prt0669 Ordered   8  LORazepam 0 5 MG Oral Tablet; Take 1 tablet 3 times daily  as needed; Therapy: 30Jnd1608 to (Evaluate:10Nov2017); Last Rx:67Olc3159 Ordered   9  Myrbetriq 50 MG Oral Tablet Extended Release 24 Hour; Take one tablet daily; Therapy: 14KBZ9897 to (Last Rx:62Udo0258)  Requested for: 18Okk9787 Ordered   10   Neurontin 600 MG Oral Tablet; TAKE1 5 TABS DAILY; Therapy: (Recorded:19Nrl0356) to Recorded   11  Omeprazole 20 MG Oral Capsule Delayed Release; take 1 capsule daily as needed; Therapy: 45PSC7871 to ([phone])  Requested for: 15ZGQ2282; Last    Rx:17Lnt8489 Ordered   12  ROPINIRole HCl - 3 MG Oral Tablet; Take 1 tab BID; Therapy: 16Nwn3394 to (Last Rx:25Hqo1819)  Requested for: 55Yae1094 Ordered   13  Sertraline HCl - 100 MG Oral Tablet; TAKE 2 TABLET Every morning; Therapy: (Recorded:20Obk6886) to Recorded    The medication list was reviewed and updated today  Allergies  1  No Known Drug Allergies  2  Seasonal    Vitals  Vital Signs    Recorded: 65AQB1885 08:46AM   Temperature 96 8 F    Heart Rate 78    Systolic 823    Diastolic 82    Height 5 ft 8 in    Patient Refused Weight Yes Yes   O2 Saturation 98      Physical Exam    Constitutional   General appearance: Abnormal   morbidly obese  Eyes   Conjunctiva and lids: No swelling, erythema or discharge  Pupils and irises: Equal, round and reactive to light  Ears, Nose, Mouth, and Throat   External inspection of ears and nose: Normal     Pulmonary   Respiratory effort: No increased work of breathing or signs of respiratory distress  Auscultation of lungs: Clear to auscultation  Cardiovascular   Palpation of heart: Normal PMI, no thrills  Auscultation of heart: Normal rate and rhythm, normal S1 and S2, without murmurs  Examination of extremities for edema and/or varicosities: Normal     Lymphatic   Palpation of lymph nodes in neck: No lymphadenopathy  Skin   Skin and subcutaneous tissue: Normal without rashes or lesions  Neurologic   Cranial nerves: Cranial nerves 2-12 intact      Psychiatric   Orientation to person, place, and time: Normal     Mood and affect: Normal          Results/Data  Hemoglobin A1c- POC 17CCJ8056 09:07AM [facility]     Test Name Result Flag Reference   HEMOGLOBIN A1C 5 5         Future Appointments    Date/Time Provider Specialty Site   11/14/2017 03:00 PM Nurse, Urology Diagnostic  [facility] [place]   12/12/2017 03:00 PM Nurse, Urology Diagnostic  [facility]   10/31/2017 05:00 PM Qasim Pineda [place]   11/02/2017 01:00 PM Qasim Pineda, [address] [facility]   11/07/2017 05:00 PM Qasim Pineda, [address] [facility]   11/09/2017 09:00 AM Qasim Pineda, [address] [facility]   11/14/2017 05:00 PM Qasim Pineda, [address] [facility]   11/15/2017 01:00 PM Qasim Pineda, [address] [facility]   11/21/2017 05:00 PM Qasim Pineda, [address] [facility]   11/28/2017 05:00 PM Qasim Pineda, [address] [facility]   11/30/2017 09:00 AM Qasim Pineda, [address] [facility]   12/05/2017 05:00 PM Qasim Pineda [place]   12/12/2017 05:00 PM Sharon Pineda [facility]   12/14/2017 09:00 AM Qasim Pineda, [address] [facility]   12/19/2017 01:00 PM Qasim Honeycutt, [place]     Signatures   Electronically signed by :  Jennifer Staley, [facility]; Oct 30 2017  9:06AM EST                       (Author)    Electronically signed by : Hema Bojorquez MD; Oct 30 2017  9:09AM EST                       (Author)

## 2017-11-02 ENCOUNTER — ALLSCRIPTS OFFICE VISIT (OUTPATIENT)
Dept: OTHER | Facility: OTHER | Age: 32
End: 2017-11-02

## 2017-11-07 ENCOUNTER — ALLSCRIPTS OFFICE VISIT (OUTPATIENT)
Dept: OTHER | Facility: OTHER | Age: 32
End: 2017-11-07

## 2017-11-09 ENCOUNTER — ALLSCRIPTS OFFICE VISIT (OUTPATIENT)
Dept: OTHER | Facility: OTHER | Age: 32
End: 2017-11-09

## 2017-11-14 ENCOUNTER — ALLSCRIPTS OFFICE VISIT (OUTPATIENT)
Dept: OTHER | Facility: OTHER | Age: 32
End: 2017-11-14

## 2017-11-15 ENCOUNTER — ALLSCRIPTS OFFICE VISIT (OUTPATIENT)
Dept: OTHER | Facility: OTHER | Age: 32
End: 2017-11-15

## 2017-11-21 ENCOUNTER — ALLSCRIPTS OFFICE VISIT (OUTPATIENT)
Dept: OTHER | Facility: OTHER | Age: 32
End: 2017-11-21

## 2017-11-22 ENCOUNTER — ALLSCRIPTS OFFICE VISIT (OUTPATIENT)
Dept: OTHER | Facility: OTHER | Age: 32
End: 2017-11-22

## 2017-11-28 ENCOUNTER — ALLSCRIPTS OFFICE VISIT (OUTPATIENT)
Dept: OTHER | Facility: OTHER | Age: 32
End: 2017-11-28

## 2017-11-29 ENCOUNTER — ALLSCRIPTS OFFICE VISIT (OUTPATIENT)
Dept: OTHER | Facility: OTHER | Age: 32
End: 2017-11-29

## 2017-12-05 ENCOUNTER — ALLSCRIPTS OFFICE VISIT (OUTPATIENT)
Dept: OTHER | Facility: OTHER | Age: 32
End: 2017-12-05

## 2017-12-08 ENCOUNTER — ALLSCRIPTS OFFICE VISIT (OUTPATIENT)
Dept: OTHER | Facility: OTHER | Age: 32
End: 2017-12-08

## 2017-12-12 ENCOUNTER — ALLSCRIPTS OFFICE VISIT (OUTPATIENT)
Dept: OTHER | Facility: OTHER | Age: 32
End: 2017-12-12

## 2017-12-13 ENCOUNTER — ALLSCRIPTS OFFICE VISIT (OUTPATIENT)
Dept: OTHER | Facility: OTHER | Age: 32
End: 2017-12-13

## 2017-12-19 ENCOUNTER — ALLSCRIPTS OFFICE VISIT (OUTPATIENT)
Dept: OTHER | Facility: OTHER | Age: 32
End: 2017-12-19

## 2017-12-22 ENCOUNTER — GENERIC CONVERSION - ENCOUNTER (OUTPATIENT)
Dept: UROLOGY | Facility: AMBULATORY SURGERY CENTER | Age: 32
End: 2017-12-22

## 2017-12-27 ENCOUNTER — ALLSCRIPTS OFFICE VISIT (OUTPATIENT)
Dept: OTHER | Facility: OTHER | Age: 32
End: 2017-12-27

## 2017-12-30 ENCOUNTER — GENERIC CONVERSION - ENCOUNTER (OUTPATIENT)
Dept: UROLOGY | Facility: AMBULATORY SURGERY CENTER | Age: 32
End: 2017-12-30

## 2017-12-31 ENCOUNTER — OFFICE VISIT (OUTPATIENT)
Dept: URGENT CARE | Age: 32
End: 2017-12-31
Payer: MEDICARE

## 2017-12-31 ENCOUNTER — APPOINTMENT (OUTPATIENT)
Dept: RADIOLOGY | Age: 32
End: 2017-12-31
Attending: PHYSICIAN ASSISTANT
Payer: MEDICARE

## 2017-12-31 ENCOUNTER — TRANSCRIBE ORDERS (OUTPATIENT)
Dept: URGENT CARE | Age: 32
End: 2017-12-31

## 2017-12-31 DIAGNOSIS — M79.673 PAIN OF FOOT: ICD-10-CM

## 2017-12-31 PROCEDURE — 73630 X-RAY EXAM OF FOOT: CPT

## 2017-12-31 PROCEDURE — G0463 HOSPITAL OUTPT CLINIC VISIT: HCPCS | Performed by: FAMILY MEDICINE

## 2017-12-31 PROCEDURE — 99213 OFFICE O/P EST LOW 20 MIN: CPT | Performed by: FAMILY MEDICINE

## 2018-01-01 NOTE — PROGRESS NOTES
Assessment   1  Left foot infection (686 9) (L08 9)   2  Fracture of left toe with routine healing (V54 19) (O32 091O)    Plan   Foot pain    · * XR FOOT 3+ VIEW LEFT; Status:Active; Requested for:20Iwk0036;   Fracture of left toe with routine healing, Left foot infection    · Ciprofloxacin HCl - 500 MG Oral Tablet; TAKE 1 TABLET TWICE DAILY   · Continue with our present treatment plan ; Status:Complete;   Done: 97FVO7984   · Resume activity to your tolerance ; Status:Complete;   Done: 29SFB0053    Discussion/Summary   Discussion Summary:    If you have any increased redness or swelling go to emergency room for further evaluation  May do warm water and Epsom salt soaks frequently  Medication Side Effects Reviewed: Possible side effects of new medications were reviewed with the patient/guardian today  Understands and agrees with treatment plan: The treatment plan was reviewed with the patient/guardian  The patient/guardian understands and agrees with the treatment plan    Counseling Documentation With Imm: The patient was counseled regarding instructions for management,-- prognosis,-- patient and family education,-- impressions,-- risks and benefits of treatment options,-- importance of compliance with treatment  Follow Up Instructions: Follow Up with your Primary Care Provider in 3-4 days  If your symptoms worsen, go to the nearest [place] [address] Emergency Department  Chief Complaint   1  Foot Problem  Chief Complaint Free Text Note Form: Dec 4 she was moving a table it flipped and landed on her left foot injuring her left little toe- the toe is red and swollen the pain has not gotten better since the incident      History of Present Illness   HPI: December 4th patient had a table land on her left toe  She did not get checked out yet  It has been fellow better but now it is more red and swollen  Foot Problem: PRICE GONZALEZ presents with complaints of foot problem        Associated symptoms include foot pain-- and-- foot swelling, but-- no foot numbness-- and-- no foot skin lesions  Review of Systems   Focused-Female:      Constitutional: as noted in HPI  Musculoskeletal: as noted in HPI  Integumentary: as noted in HPI  Active Problems   1  Abnormal weight gain (783 1) (R63 5)   2  Allergic rhinitis (477 9) (J30 9)   3  Amenorrhea (626 0) (N91 2)   4  Binge-eating disorder, severe (783 6) (F50 81)   5  Bulging lumbar disc (722 10) (M51 26)   6  Dysuria (788 1) (R30 0)   7  Esophageal reflux (530 81) (K21 9)   8  Foot pain (729 5) (M79 673)   9  EILEEN (generalized anxiety disorder) (300 02) (F41 1)   10  Gambling disorder, moderate (312 31) (F63 0)   11  Hypertension (401 9) (I10)   12  Hypothyroidism (244 9) (E03 9)   13  Long term current use of therapeutic drug (V58 69) (Z79 899)   14  Lumbago (724 2) (M54 5)   15  Lumbar degenerative disc disease (722 52) (M51 36)   16  Morbid or severe obesity due to excess calories (278 01) (E66 01)   17  Need for immunization against influenza (V04 81) (Z23)   18  Nocturnal enuresis (788 36) (N39 44)   19  Obsessive-compulsive disorder (300 3) (F42 9)   20  Other abnormal glucose (790 29) (R73 09)   21  Overactive bladder (596 51) (N32 81)   22  PTSD (post-traumatic stress disorder) (309 81) (F43 10)   23  RLS (restless legs syndrome) (333 94) (G25 81)   24  Severe bipolar I disorder, most recent episode depressed (296 53) (F31 4)   25  Urgency incontinence (788 31) (N39 41)   26  Urinary incontinence, unspecified type (788 30) (R32)   27  Urinary tract infection, site unspecified (599 0) (N39 0)   28  Vitamin D deficiency (268 9) (E55 9)    Past Medical History   1  History of Abscess of face (682 0) (L02 01)   2  History of Acute diverticulitis (562 11) (K57 92)   3  History of Acute frontal sinusitis (461 1) (J01 10)   4  History of Acute nonsuppurative otitis media, unspecified laterality (381 00) (H65 199)   5   History of Acute pain (338 19) (R52)   6  History of Anorexia nervosa in remission (307 1) (F50 00)   7  History of Backache (724 5) (M54 9)   8  History of Candidiasis, cutaneous (112 3) (B37 2)   9  History of Dog bite (879 8,E906 0) (W54  0XXA)   10  History of Dysfunction of left eustachian tube (381 81) (H69 82)   11  History of Dysuria (788 1) (R30 0)   12  History of acute bronchitis (V12 69) (Z87 09)   13  History of acute sinusitis (V12 69) (Z87 09)   14  History of bronchitis (V12 69) (Z87 09)   15  History of cough   16  Denied: History of head injury   17  History of Pseudotumor cerebri (348 2) (G93 2)   18  Denied: History of Seizures   19  History of Suicide and self-inflicted injury (S641 8) (Q36 9VIY)  Active Problems And Past Medical History Reviewed: The active problems and past medical history were reviewed and updated today  Family History   Mother    1  Family history of bicuspid aortic valve (V17 49) (Z82 79)   2  Family history of depression (V17 0) (Z81 8)   3  Family history of Hypertension (V17 49)   4  Family history of Status post aortic valve replacement  Father    5  Family history of Hypertension (V17 49)   6  Family history of Hypothyroidism   7  No family history of mental disorder  Brother    8  Family history of Hypertension (V17 49)  Maternal Grandmother    9  Family history of Cancer   10  Family history of cerebrovascular accident (V17 1) (Z82 3)   11  Family history of Heart Disease (V17 49)  Paternal Grandmother    15  Family history of Cancer   13  Family history of malignant neoplasm (V16 9) (Z80 9)  Maternal Grandfather    15  Family history of pneumonia (V18 8) (Z83 1)  Paternal Grandfather    13  Family history of pneumonia (V18 8) (Z83 1)  Family History    12  Family history of Arthritis (716 90) (M19 90)   17  Family history of Breast Cancer (V16 3)   18  Family history of osteoporosis (V17 81) (Z82 62)   19  Family history of Hypertension (V17 49)   20   Family history of Transient Ischemic Attack  Family History Reviewed: The family history was reviewed and updated today  Social History    · Caffeine Use   · Currently sexually active   · Disabled   · Former smoker (J77 69) (X12 255)   · Has no children   · High school graduate   · Lives with parents   · Never smoked   · No alcohol use   · Single   · Tea  Social History Reviewed: The social history was reviewed and updated today  Surgical History   1  History of Sinus Surgery   2  History of Tonsillectomy  Surgical History Reviewed: The surgical history was reviewed and updated today  Current Meds    1  HydrOXYzine HCl - 50 MG Oral Tablet; TAKE 1 1/2 TO 2 TABLETS AT BEDTIME AS     NEEDED FOR INSOMNIA; Therapy: 42TMW2316 to (Evaluate:12Aug2017)  Requested for: 95MSB0237; Last     Rx:13Jun2017 Ordered   2  LamoTRIgine 200 MG Oral Tablet; TAKE 2 TABLETS AT BEDTIME; Therapy: 01EXV3139 to (186 0124)  Requested for: 31Urg8702; Last     Rx:58Swf4530 Ordered   3  Latuda 120 MG Oral Tablet; TAKE 1 TABLET IN THE EVENING WITH FOOD; Therapy: 50Sel3929 to [phone])  Requested for: 61AMA4491; Last     Rx:33Elj2559 Ordered   4  Levocetirizine Dihydrochloride 5 MG Oral Tablet; TAKE 1 TABLET BY MOUTH ONCE A     DAY; Therapy: 79Rab0924 to (Evaluate:15Jan2018)  Requested for: 04FNH8099; Last     Rx:16Nov2017 Ordered   5  Levothyroxine Sodium 100 MCG Oral Tablet; take 1 tablet by mouth every day; Therapy: 91OQA8071 to (Alison Cushing)  Requested for: 58Zxu5536; Last     Rx:22Csp6609 Ordered   6  Lisinopril 10 MG Oral Tablet; TAKE 1 TABLET BY MOUTH EVERY DAY FOR BLOOD     PRESSURE; Therapy: 13BMX4046 to )  Requested for: 30Sri5215; Last     Rx:85Hsc3701 Ordered   7  Lithium Carbonate  MG Oral Tablet Extended Release; take 2 tablets at bedtime; Therapy: 47YYG7254 to (Evaluate:09Jan2018)  Requested for: 42Ulo5870; Last     Rx:61Kgm1102 Ordered   8   LORazepam 0 5 MG Oral Tablet; Take 1 tablet 3 times daily  as needed; Therapy: 09Nqk8718 to (Evaluate:10Nov2017); Last Rx:54Grz6607 Ordered   9  Myrbetriq 50 MG Oral Tablet Extended Release 24 Hour; Take one tablet daily; Therapy: 99OGT7919 to (Last Rx:32Usi2113)  Requested for: 25Iai1010 Ordered   10  Neurontin 600 MG Oral Tablet; TAKE1 5 TABS DAILY; Therapy: (Recorded:30Oct2017) to Recorded   11  Omeprazole 20 MG Oral Capsule Delayed Release; take 1 capsule daily as needed; Therapy: 98QYD0925 to (Lele Li)  Requested for: 22CSP3109; Last      Rx:39Kpf4205 Ordered   12  ROPINIRole HCl - 3 MG Oral Tablet; take 1 tablet by mouth twice a day; Therapy: 71Qia6516 to (Evaluate:25Apr2018)  Requested for: 91Tfz0731; Last      Rx:94Lxc4527 Ordered   13  Sertraline HCl - 100 MG Oral Tablet; TAKE 2 TABLET Every morning; Therapy: (Recorded:30Oct2017) to Recorded  Medication List Reviewed: The medication list was reviewed and updated today  Allergies   1  No Known Drug Allergies  2  Seasonal    Vitals   Signs   Recorded: 31Dec2017 08:34AM   Temperature: 97 8 F  Heart Rate: 74  Respiration: 18  Systolic: 564  Diastolic: 72  Height: 5 ft 8 in  Weight: 377 lb   BMI Calculated: 57 32  BSA Calculated: 2 68  O2 Saturation: 98  LMP: 61Nfk2699  Pain Scale: 7    Physical Exam        Constitutional      General appearance: No acute distress, well appearing and well nourished  Musculoskeletal Left 5th toe is slightly swollen and reddened  The erythema does not extend past the MTP joint  No significant tenderness  Neurologic      Sensation: No sensory loss  Psychiatric      Orientation to person, place, and time: Normal        Mood and affect: Normal        Results/Data   Diagnostic Studies Reviewed: I personally reviewed the films/images/results in the office today  My interpretation follows  X-ray Review Small old healing fracture the distal 5th toe        Future Appointments Date/Time Provider Specialty Site   01/11/2018 01:45 PM PAUL Hargrove   Urology [facility]   01/18/2018 09:30 AM LEIGHA Reveles Nurse Practitioner [facility]   01/02/2018 05:00  [address], [address], [facility]   01/03/2018 06:00  [address], [address], [facility]   01/08/2018 01:00  [address], [address], [address] [facility]   01/09/2018 05:00  [address], [address], [facility]   01/16/2018 05:00  [address], [address], [address] [facility]   01/19/2018 09:00  [address], [address], [address] [facility]   01/23/2018 05:00  [address], [address], [address] [facility]   01/25/2018 11:00  [address], [address], [address] [facility]   01/30/2018 05:00  [address], [address], [facility]   02/01/2018 11:00  [address], [address], [address] [facility]   02/09/2018 10:00  [address], [address], [address] [facility]   02/16/2018 10:00  [address], [address], [address] [facility]   03/02/2018 08:00  [address] [place] [address], [address], [address] [facility]   03/08/2018 11:00  [address], [address], [address] [facility]   03/15/2018 01:00 PM Jerry Batres [facility]   03/22/2018 11:00  [address], [address], [address] [facility]   02/26/2018 08:45 AM Remberto Charles, [address] [place] [address]     Signatures    Electronically signed by : Berta Frias, [facility]; Dec 31 2017  9:02AM EST                       (Author)     Electronically signed by : Rosmery Larsen DO; Dec 31 2017  2:45PM EST                       (Co-author)

## 2018-01-02 ENCOUNTER — ALLSCRIPTS OFFICE VISIT (OUTPATIENT)
Dept: OTHER | Facility: OTHER | Age: 33
End: 2018-01-02

## 2018-01-03 ENCOUNTER — ALLSCRIPTS OFFICE VISIT (OUTPATIENT)
Dept: OTHER | Facility: OTHER | Age: 33
End: 2018-01-03

## 2018-01-04 ENCOUNTER — GENERIC CONVERSION - ENCOUNTER (OUTPATIENT)
Dept: OTHER | Facility: OTHER | Age: 33
End: 2018-01-04

## 2018-01-06 ENCOUNTER — GENERIC CONVERSION - ENCOUNTER (OUTPATIENT)
Dept: UROLOGY | Facility: AMBULATORY SURGERY CENTER | Age: 33
End: 2018-01-06

## 2018-01-08 ENCOUNTER — ALLSCRIPTS OFFICE VISIT (OUTPATIENT)
Dept: OTHER | Facility: OTHER | Age: 33
End: 2018-01-08

## 2018-01-08 ENCOUNTER — HOSPITAL ENCOUNTER (INPATIENT)
Facility: HOSPITAL | Age: 33
LOS: 3 days | Discharge: HOME | DRG: 885 | End: 2018-01-11
Attending: EMERGENCY MEDICINE | Admitting: PSYCHIATRY & NEUROLOGY
Payer: MEDICARE

## 2018-01-08 DIAGNOSIS — K21.9 GASTROESOPHAGEAL REFLUX DISEASE WITHOUT ESOPHAGITIS: ICD-10-CM

## 2018-01-08 DIAGNOSIS — M54.50 LOW BACK PAIN: ICD-10-CM

## 2018-01-08 DIAGNOSIS — G25.81 RESTLESS LEG SYNDROME: ICD-10-CM

## 2018-01-08 DIAGNOSIS — F32.A DEPRESSION: ICD-10-CM

## 2018-01-08 DIAGNOSIS — D72.829 LEUKOCYTOSIS, UNSPECIFIED TYPE: ICD-10-CM

## 2018-01-08 DIAGNOSIS — F42.2 MIXED OBSESSIONAL THOUGHTS AND ACTS: ICD-10-CM

## 2018-01-08 DIAGNOSIS — M25.552 PAIN IN LEFT HIP: ICD-10-CM

## 2018-01-08 DIAGNOSIS — I10 HTN (HYPERTENSION): ICD-10-CM

## 2018-01-08 DIAGNOSIS — R52 PAIN: ICD-10-CM

## 2018-01-08 DIAGNOSIS — F31.4 BIPOLAR 1 DISORDER, DEPRESSED, SEVERE (HCC): Primary | ICD-10-CM

## 2018-01-08 DIAGNOSIS — N31.9 BLADDER DYSFUNCTION: ICD-10-CM

## 2018-01-08 DIAGNOSIS — M51.26 OTHER INTERVERTEBRAL DISC DISPLACEMENT, LUMBAR REGION: ICD-10-CM

## 2018-01-08 DIAGNOSIS — E03.9 HYPOTHYROIDISM, UNSPECIFIED TYPE: ICD-10-CM

## 2018-01-08 LAB
AMPHETAMINES SERPL QL SCN: NEGATIVE
BARBITURATES UR QL: NEGATIVE
BENZODIAZ UR QL: NEGATIVE
COCAINE UR QL: NEGATIVE
ETHANOL EXG-MCNC: 0 MG/DL
METHADONE UR QL: NEGATIVE
OPIATES UR QL SCN: NEGATIVE
PCP UR QL: NEGATIVE
THC UR QL: NEGATIVE

## 2018-01-08 PROCEDURE — 80307 DRUG TEST PRSMV CHEM ANLYZR: CPT | Performed by: EMERGENCY MEDICINE

## 2018-01-08 PROCEDURE — 99285 EMERGENCY DEPT VISIT HI MDM: CPT

## 2018-01-08 PROCEDURE — 82075 ASSAY OF BREATH ETHANOL: CPT | Performed by: EMERGENCY MEDICINE

## 2018-01-08 RX ORDER — LEVOTHYROXINE SODIUM 0.1 MG/1
100 TABLET ORAL
Status: DISCONTINUED | OUTPATIENT
Start: 2018-01-09 | End: 2018-01-11 | Stop reason: HOSPADM

## 2018-01-08 RX ORDER — HYDROXYZINE HYDROCHLORIDE 25 MG/1
25 TABLET, FILM COATED ORAL EVERY 4 HOURS PRN
Status: DISCONTINUED | OUTPATIENT
Start: 2018-01-08 | End: 2018-01-11 | Stop reason: HOSPADM

## 2018-01-08 RX ORDER — ZIPRASIDONE HYDROCHLORIDE 20 MG/1
20 CAPSULE ORAL EVERY 4 HOURS PRN
Status: DISCONTINUED | OUTPATIENT
Start: 2018-01-08 | End: 2018-01-11 | Stop reason: HOSPADM

## 2018-01-08 RX ORDER — LORAZEPAM 1 MG/1
1 TABLET ORAL EVERY 4 HOURS PRN
Status: DISCONTINUED | OUTPATIENT
Start: 2018-01-08 | End: 2018-01-11 | Stop reason: HOSPADM

## 2018-01-08 RX ORDER — TRAMADOL HYDROCHLORIDE 50 MG/1
50 TABLET ORAL EVERY 6 HOURS PRN
Status: DISCONTINUED | OUTPATIENT
Start: 2018-01-08 | End: 2018-01-11 | Stop reason: HOSPADM

## 2018-01-08 RX ORDER — LEVOCETIRIZINE DIHYDROCHLORIDE 5 MG/1
5 TABLET, FILM COATED ORAL EVERY EVENING
COMMUNITY
End: 2018-03-22 | Stop reason: SDUPTHER

## 2018-01-08 RX ORDER — ROPINIROLE 1 MG/1
3 TABLET, FILM COATED ORAL EVERY MORNING
Status: DISCONTINUED | OUTPATIENT
Start: 2018-01-09 | End: 2018-01-11 | Stop reason: HOSPADM

## 2018-01-08 RX ORDER — TRAZODONE HYDROCHLORIDE 50 MG/1
50 TABLET ORAL
Status: DISCONTINUED | OUTPATIENT
Start: 2018-01-08 | End: 2018-01-11 | Stop reason: HOSPADM

## 2018-01-08 RX ORDER — LAMOTRIGINE 100 MG/1
200 TABLET ORAL DAILY
Status: DISCONTINUED | OUTPATIENT
Start: 2018-01-09 | End: 2018-01-09

## 2018-01-08 RX ORDER — ZIPRASIDONE HYDROCHLORIDE 20 MG/1
20 CAPSULE ORAL EVERY 4 HOURS PRN
Status: DISCONTINUED | OUTPATIENT
Start: 2018-01-08 | End: 2018-01-08

## 2018-01-08 RX ORDER — OMEPRAZOLE 20 MG/1
20 CAPSULE, DELAYED RELEASE ORAL DAILY
COMMUNITY
End: 2018-01-11 | Stop reason: HOSPADM

## 2018-01-08 RX ORDER — IBUPROFEN 600 MG/1
600 TABLET ORAL EVERY 8 HOURS PRN
Status: DISCONTINUED | OUTPATIENT
Start: 2018-01-08 | End: 2018-01-11 | Stop reason: HOSPADM

## 2018-01-08 RX ORDER — LISINOPRIL 10 MG/1
10 TABLET ORAL DAILY
Status: DISCONTINUED | OUTPATIENT
Start: 2018-01-09 | End: 2018-01-11 | Stop reason: HOSPADM

## 2018-01-08 RX ORDER — BENZTROPINE MESYLATE 1 MG/1
1 TABLET ORAL
Status: DISCONTINUED | OUTPATIENT
Start: 2018-01-08 | End: 2018-01-11 | Stop reason: HOSPADM

## 2018-01-08 RX ORDER — LISINOPRIL 10 MG/1
10 TABLET ORAL DAILY
Status: ON HOLD | COMMUNITY
End: 2018-01-11

## 2018-01-08 RX ORDER — LITHIUM CARBONATE 300 MG/1
900 CAPSULE ORAL
Status: DISCONTINUED | OUTPATIENT
Start: 2018-01-08 | End: 2018-01-11 | Stop reason: HOSPADM

## 2018-01-08 RX ORDER — GABAPENTIN 300 MG/1
900 CAPSULE ORAL
Status: DISCONTINUED | OUTPATIENT
Start: 2018-01-08 | End: 2018-01-10

## 2018-01-08 RX ORDER — HALOPERIDOL 5 MG/ML
5 INJECTION INTRAMUSCULAR EVERY 6 HOURS PRN
Status: DISCONTINUED | OUTPATIENT
Start: 2018-01-08 | End: 2018-01-11 | Stop reason: HOSPADM

## 2018-01-08 RX ORDER — LORAZEPAM 2 MG/ML
2 INJECTION INTRAMUSCULAR EVERY 4 HOURS PRN
Status: DISCONTINUED | OUTPATIENT
Start: 2018-01-08 | End: 2018-01-11 | Stop reason: HOSPADM

## 2018-01-08 RX ORDER — GABAPENTIN 300 MG/1
900 CAPSULE ORAL
Status: ON HOLD | COMMUNITY
End: 2018-01-11

## 2018-01-08 RX ORDER — SERTRALINE HYDROCHLORIDE 100 MG/1
200 TABLET, FILM COATED ORAL DAILY
Status: ON HOLD | COMMUNITY
End: 2018-01-11

## 2018-01-08 RX ORDER — ROPINIROLE 3 MG/1
3 TABLET, FILM COATED ORAL 3 TIMES DAILY
COMMUNITY
End: 2018-01-11 | Stop reason: HOSPADM

## 2018-01-08 RX ORDER — LEVOTHYROXINE SODIUM 0.03 MG/1
25 TABLET ORAL DAILY
COMMUNITY
End: 2018-01-11 | Stop reason: HOSPADM

## 2018-01-08 RX ORDER — RISPERIDONE 1 MG/1
1 TABLET, ORALLY DISINTEGRATING ORAL
Status: DISCONTINUED | OUTPATIENT
Start: 2018-01-08 | End: 2018-01-11 | Stop reason: HOSPADM

## 2018-01-08 RX ORDER — LAMOTRIGINE 200 MG/1
350 TABLET ORAL DAILY
COMMUNITY
End: 2018-01-11 | Stop reason: HOSPADM

## 2018-01-08 RX ORDER — MAGNESIUM HYDROXIDE/ALUMINUM HYDROXICE/SIMETHICONE 120; 1200; 1200 MG/30ML; MG/30ML; MG/30ML
30 SUSPENSION ORAL EVERY 4 HOURS PRN
Status: DISCONTINUED | OUTPATIENT
Start: 2018-01-08 | End: 2018-01-11 | Stop reason: HOSPADM

## 2018-01-08 RX ORDER — OMEPRAZOLE 20 MG/1
20 CAPSULE, DELAYED RELEASE ORAL
Status: DISCONTINUED | OUTPATIENT
Start: 2018-01-09 | End: 2018-01-11 | Stop reason: HOSPADM

## 2018-01-08 RX ORDER — ROPINIROLE 1 MG/1
3 TABLET, FILM COATED ORAL
Status: DISCONTINUED | OUTPATIENT
Start: 2018-01-08 | End: 2018-01-10

## 2018-01-08 RX ORDER — HALOPERIDOL 5 MG
5 TABLET ORAL EVERY 6 HOURS PRN
Status: DISCONTINUED | OUTPATIENT
Start: 2018-01-08 | End: 2018-01-11 | Stop reason: HOSPADM

## 2018-01-08 RX ORDER — SERTRALINE HYDROCHLORIDE 100 MG/1
200 TABLET, FILM COATED ORAL DAILY
Status: DISCONTINUED | OUTPATIENT
Start: 2018-01-09 | End: 2018-01-11 | Stop reason: HOSPADM

## 2018-01-08 RX ORDER — LORATADINE 10 MG/1
5 TABLET ORAL EVERY EVENING
Status: DISCONTINUED | OUTPATIENT
Start: 2018-01-08 | End: 2018-01-09

## 2018-01-08 RX ORDER — ZIPRASIDONE MESYLATE 20 MG/ML
20 INJECTION, POWDER, LYOPHILIZED, FOR SOLUTION INTRAMUSCULAR EVERY 4 HOURS PRN
Status: DISCONTINUED | OUTPATIENT
Start: 2018-01-08 | End: 2018-01-11 | Stop reason: HOSPADM

## 2018-01-08 RX ORDER — ACETAMINOPHEN 325 MG/1
650 TABLET ORAL EVERY 6 HOURS PRN
Status: DISCONTINUED | OUTPATIENT
Start: 2018-01-08 | End: 2018-01-11 | Stop reason: HOSPADM

## 2018-01-08 RX ORDER — BENZTROPINE MESYLATE 1 MG/ML
1 INJECTION INTRAMUSCULAR; INTRAVENOUS
Status: DISCONTINUED | OUTPATIENT
Start: 2018-01-08 | End: 2018-01-11 | Stop reason: HOSPADM

## 2018-01-08 RX ORDER — LITHIUM CARBONATE 300 MG
900 TABLET ORAL 3 TIMES DAILY
COMMUNITY
End: 2018-01-11 | Stop reason: HOSPADM

## 2018-01-08 RX ORDER — HYDROCODONE BITARTRATE AND ACETAMINOPHEN 5; 325 MG/1; MG/1
1 TABLET ORAL EVERY 6 HOURS PRN
Status: DISCONTINUED | OUTPATIENT
Start: 2018-01-08 | End: 2018-01-11 | Stop reason: HOSPADM

## 2018-01-08 RX ORDER — HYDROCODONE BITARTRATE AND ACETAMINOPHEN 5; 325 MG/1; MG/1
1 TABLET ORAL EVERY 6 HOURS PRN
COMMUNITY
End: 2018-01-11 | Stop reason: HOSPADM

## 2018-01-08 RX ADMIN — LAMOTRIGINE 150 MG: 100 TABLET ORAL at 23:01

## 2018-01-08 RX ADMIN — ROPINIROLE 3 MG: 1 TABLET, FILM COATED ORAL at 23:01

## 2018-01-08 RX ADMIN — LITHIUM CARBONATE 900 MG: 300 CAPSULE, GELATIN COATED ORAL at 23:01

## 2018-01-08 RX ADMIN — GABAPENTIN 900 MG: 300 CAPSULE ORAL at 23:01

## 2018-01-08 NOTE — ED PROVIDER NOTES
History  Chief Complaint   Patient presents with    Depression     Patient has been having alot of changes in her medications and has been depressed since dec 20  last inpatient was at [place] may 2013        48-year-old female with history of depression previous suicide attempts previous hospitalizations for suicidal ideation as well as eating disorder and OCD presents for evaluation of suicidal thoughts  For the past 3 weeks after recent medication change she has not been sleeping and has had worsening depression described as helpless and hopelessness  She denies any specific suicidal plan or homicidal ideation  No visual or auditory hallucinations  No drug or alcohol use  States she feels like she needs to, to the hospital for help  She discussed this with her psychiatrist today who recommended she come to the ED  She reports she has been compliant with her medications  She denies any other symptoms at this time            Prior to Admission Medications   Prescriptions Last Dose Informant Patient Reported? Taking?    HYDROcodone-acetaminophen (NORCO) 5-325 mg per tablet Unknown at Unknown time  Yes No   Sig: Take 1 tablet by mouth every 6 (six) hours as needed for pain   gabapentin (NEURONTIN) 300 mg capsule 1/8/2018 at Unknown time  Yes Yes   Sig: Take 900 mg by mouth daily at bedtime   lamoTRIgine (LaMICtal) 200 MG tablet 1/8/2018 at 2000  Yes Yes   Sig: Take 350 mg by mouth daily   levocetirizine (XYZAL) 5 MG tablet 1/8/2018 at 0800  Yes Yes   Sig: Take 5 mg by mouth every evening   levothyroxine (LEVOXYL) 25 mcg tablet 1/8/2018 at 0800  Yes Yes   Sig: Take 25 mcg by mouth daily   levothyroxine 25 mcg tablet 1/8/2018 at 2000  Yes Yes   Sig: Take 25 mcg by mouth daily   lisinopril (ZESTRIL) 10 mg tablet 1/8/2018 at 2000  Yes Yes   Sig: Take 10 mg by mouth daily   lithium 300 MG tablet 1/8/2018 at 2000  Yes Yes   Sig: Take 900 mg by mouth 3 (three) times a day   lurasidone (LATUDA) 120 mg tablet 1/8/2018 at 0800  Yes Yes   Sig: Take 120 mg by mouth daily with breakfast   omeprazole (PriLOSEC) 20 mg delayed release capsule 1/8/2018 at 0800  Yes Yes   Sig: Take 20 mg by mouth daily   rOPINIRole (REQUIP) 3 mg tablet 1/8/2018 at 0800  Yes Yes   Sig: Take 3 mg by mouth 3 (three) times a day   sertraline (ZOLOFT) 100 mg tablet 1/8/2018 at 0800  Yes Yes   Sig: Take 200 mg by mouth daily      Facility-Administered Medications: None       Past Medical History:   Diagnosis Date    Bipolar disorder ([facility] [address] )     Psychiatric disorder     Psychiatric illness        No past surgical history on file  History reviewed  No pertinent family history  I have reviewed and agree with the history as documented  Social History   Substance Use Topics    Smoking status: Never Smoker    Smokeless tobacco: Never Used    Alcohol use No        Review of Systems   Constitutional: Negative for chills, fatigue and fever  HENT: Negative for congestion  Eyes: Negative for visual disturbance  Respiratory: Negative for chest tightness and shortness of breath  Cardiovascular: Negative for chest pain and palpitations  Gastrointestinal: Negative for abdominal pain, nausea and vomiting  Musculoskeletal: Negative for back pain and gait problem  Skin: Negative for rash  Neurological: Negative for dizziness, syncope, weakness, light-headedness, numbness and headaches  Psychiatric/Behavioral: Positive for sleep disturbance and suicidal ideas  Negative for agitation, confusion, hallucinations and self-injury  The patient is not nervous/anxious          Physical Exam  ED Triage Vitals   Temperature Pulse Respirations Blood Pressure SpO2   01/08/18 1642 01/08/18 1642 01/08/18 1642 01/08/18 1642 01/08/18 1642   98 4 °F (36 9 °C) 83 18 (!) 172/74 100 %      Temp Source Heart Rate Source Patient Position - Orthostatic VS BP Location FiO2 (%)   01/08/18 1642 01/08/18 1642 01/08/18 1642 01/08/18 1642 --   Tympanic Monitor Sitting Right arm       Pain Score       01/09/18 0721       No Pain           Orthostatic Vital Signs  Vitals:    01/09/18 1125 01/09/18 1548 01/09/18 2025 01/10/18 0720   BP: 114/56 112/64 135/66 120/64   Pulse: 58 69 69 64   Patient Position - Orthostatic VS:  Standing Lying Sitting       Physical Exam   Constitutional: She is oriented to person, place, and time  Vital signs are normal  She appears well-developed and well-nourished  She does not appear ill  No distress  HENT:   Head: Normocephalic and atraumatic  Head is without abrasion and without contusion  Right Ear: Tympanic membrane normal    Left Ear: Tympanic membrane normal    Nose: Nose normal    Mouth/Throat: Uvula is midline, oropharynx is clear and moist and mucous membranes are normal    Eyes: Conjunctivae and EOM are normal  Pupils are equal, round, and reactive to light  Neck: Trachea normal, normal range of motion, full passive range of motion without pain and phonation normal  Neck supple  No JVD present  No spinous process tenderness and no muscular tenderness present  Carotid bruit is not present  Normal range of motion present  No thyromegaly present  Cardiovascular: Normal rate, regular rhythm and intact distal pulses  Exam reveals no friction rub  No murmur heard  Pulmonary/Chest: Effort normal and breath sounds normal  No accessory muscle usage or stridor  No tachypnea  No respiratory distress  She has no decreased breath sounds  She has no wheezes  She has no rhonchi  She has no rales  She exhibits no tenderness, no crepitus, no edema and no retraction  Abdominal: Soft  Normal appearance and bowel sounds are normal  She exhibits no distension  There is no tenderness  There is no rigidity, no rebound, no guarding and no CVA tenderness  Musculoskeletal: Normal range of motion  Lymphadenopathy:     She has no cervical adenopathy  Neurological: She is alert and oriented to person, place, and time  She has normal strength   No sensory deficit  GCS eye subscore is 4  GCS verbal subscore is 5  GCS motor subscore is 6  Skin: Skin is warm, dry and intact  No rash noted  She is not diaphoretic  Psychiatric: Her speech is normal and behavior is normal  Judgment normal  Her affect is not angry, not blunt, not labile and not inappropriate  Thought content is not paranoid and not delusional  She exhibits a depressed mood  She expresses suicidal ideation  She expresses no homicidal ideation  She expresses no suicidal plans and no homicidal plans  Nursing note and vitals reviewed        ED Medications  Medications   hydrOXYzine HCL (ATARAX) tablet 25 mg (not administered)   LORazepam (ATIVAN) tablet 1 mg (not administered)   LORazepam (ATIVAN) 2 mg/mL injection 2 mg (not administered)   traZODone (DESYREL) tablet 50 mg (not administered)   risperiDONE (RisperDAL M-TABS) dispersible tablet 1 mg (not administered)   haloperidol (HALDOL) tablet 5 mg (not administered)   haloperidol lactate (HALDOL) injection 5 mg (not administered)   ziprasidone (GEODON) IM injection 20 mg (not administered)   magnesium hydroxide (MILK OF MAGNESIA) 400 mg/5 mL oral suspension 30 mL (not administered)   aluminum-magnesium hydroxide-simethicone (MYLANTA) 200-200-20 mg/5 mL oral suspension 30 mL (not administered)   benztropine (COGENTIN) tablet 1 mg (not administered)   benztropine (COGENTIN) injection 1 mg (not administered)   acetaminophen (TYLENOL) tablet 650 mg (650 mg Oral Given 1/9/18 1140)   ibuprofen (MOTRIN) tablet 600 mg (not administered)   traMADol (ULTRAM) tablet 50 mg (not administered)   HYDROcodone-acetaminophen (NORCO) 5-325 mg per tablet 1 tablet (1 tablet Oral Given 1/10/18 0823)   sertraline (ZOLOFT) tablet 200 mg (200 mg Oral Given 1/10/18 0817)   omeprazole (PriLOSEC) delayed release capsule 20 mg (20 mg Oral Given 1/10/18 0608)   lurasidone (LATUDA) tablet 120 mg (120 mg Oral Given 1/9/18 1804)   levothyroxine tablet 100 mcg (100 mcg Oral Given 1/10/18 0608)   lisinopril (ZESTRIL) tablet 10 mg (10 mg Oral Given 1/10/18 0817)   lithium carbonate capsule 900 mg (900 mg Oral Given 1/9/18 2147)   rOPINIRole (REQUIP) tablet 3 mg (3 mg Oral Given 1/10/18 0817)   ziprasidone (GEODON) capsule 20 mg (not administered)   lamoTRIgine (LaMICtal) tablet 300 mg (300 mg Oral Given 1/9/18 2147)   loratadine (CLARITIN) tablet 10 mg (not administered)   cycloSPORINE (RESTASIS) 0 05 % ophthalmic emulsion 1 drop (1 drop Both Eyes Given 1/10/18 0818)   NON FORMULARY (not administered)   gabapentin (NEURONTIN) capsule 900 mg (not administered)   rOPINIRole (REQUIP) tablet 3 mg (not administered)   ciprofloxacin (CIPRO) tablet 500 mg (500 mg Oral Given 1/9/18 2147)       Diagnostic Studies  Results Reviewed     Procedure Component Value Units Date/Time    Rapid drug screen, urine [[id number]]  (Normal) Collected:  01/08/18 1737    Lab Status:  Final result Specimen:  Urine from Urine, Clean Catch Updated:  01/08/18 1800     Amph/Meth UR Negative     Barbiturate Ur Negative     Benzodiazepine Urine Negative     Cocaine Urine Negative     Methadone Urine Negative     Opiate Urine Negative     PCP Ur Negative     THC Urine Negative    Narrative:         FOR MEDICAL PURPOSES ONLY  IF CONFIRMATION NEEDED PLEASE CONTACT THE LAB WITHIN 5 DAYS      Drug Screen Cutoff Levels:  AMPHETAMINE/METHAMPHETAMINES  1000 ng/mL  BARBITURATES     200 ng/mL  BENZODIAZEPINES     200 ng/mL  COCAINE      300 ng/mL  METHADONE      300 ng/mL  OPIATES      300 ng/mL  PHENCYCLIDINE     25 ng/mL  THC       50 ng/mL    POCT alcohol breath test [[id number]]  (Normal) Resulted:  01/08/18 1735    Lab Status:  Final result Updated:  01/08/18 1735     EXTBreath Alcohol 0 000                 No orders to display         Procedures  Procedures      Phone Consults  ED Phone Contact    ED Course  ED Course                                MDM  CritCare Time    Disposition  Final diagnoses:   Depression     Time reflects when diagnosis was documented in both MDM as applicable and the Disposition within this note     Time User Action Codes Description Comment    1/8/2018  5:36 PM Dex Gebooker Add [F32 9] Depression     1/10/2018  3:11 PM Phillip Irizarry Add [D72 829] Leukocytosis, unspecified type       ED Disposition     ED Disposition Condition Comment    Transfer to Another [facility] [address] should be transferred out to [place] [address] inpatient psyche to Dr Joanie Lutz MD Documentation    [facility] Most Recent Value   Accepting Physician  Dr Donald Hawk Name, [address]   [id number]   Sending MD  [id number] [facility]      RN Documentation    [address] Name, [address]   [id number]      Follow-up Information     Follow up With Specialties Details Why Contact Info    [address] on 1/16/2018 Please go to your group therapy session on Tuesday, January 16, 2018 at 5pm  Please attend your individual therapy session on Friday, January 19, 2018 at [address]    [address] [place] [address] [facility] Please call to schedule a follow up appointment, as your appt on 1/11/17 was cancelled due to admission   [address]  [phone]    [employer]  Go to Please go to your follow up appointment on [address] [facility] [address], [address]  [phone]        Current Discharge Medication List      CONTINUE these medications which have NOT CHANGED    Details   gabapentin (NEURONTIN) 300 mg capsule Take 900 mg by mouth daily at bedtime      lamoTRIgine (LaMICtal) 200 MG tablet Take 350 mg by mouth daily      levocetirizine (XYZAL) 5 MG tablet Take 5 mg by mouth every evening      !! levothyroxine (LEVOXYL) 25 mcg tablet Take 25 mcg by mouth daily      !! levothyroxine 25 mcg tablet Take 25 mcg by mouth daily      lisinopril (ZESTRIL) 10 mg tablet Take 10 mg by mouth daily      lithium 300 MG tablet Take 900 mg by mouth 3 (three) times a day      lurasidone (LATUDA) 120 mg tablet Take 120 mg by mouth daily with breakfast      omeprazole (PriLOSEC) 20 mg delayed release capsule Take 20 mg by mouth daily      rOPINIRole (REQUIP) 3 mg tablet Take 3 mg by mouth 3 (three) times a day      sertraline (ZOLOFT) 100 mg tablet Take 200 mg by mouth daily      HYDROcodone-acetaminophen (NORCO) 5-325 mg per tablet Take 1 tablet by mouth every 6 (six) hours as needed for pain       !! - Potential duplicate medications found  Please discuss with provider  No discharge procedures on file  ED Provider  Attending physically available and evaluated Mu Sorto I managed the patient along with the ED Attending      Electronically Signed by         Glen Chávez DO  01/10/18 7693

## 2018-01-08 NOTE — ED NOTES
CW checked EVS and pt has fee for service  The pt only insurance is medicare part A and B no pre-cert required

## 2018-01-08 NOTE — ED NOTES
Pt came to the ED  The pt states that she start [facility] with Dr Beach  The pt stated Dr Beach start her on a new medication  Since the medication change the pt has not been able to sleep  This start December 22-17  The pt stated that the lack of sleep has caused her to start feeling depressed  The pt presents very flat  Dr Beach is on vacation and is out of the country  The pt states that Dr João Garza be back in the [address] until 1-19-18  The pt stated that she contact his office and they told her to come to the ED  The pt is SI without a plan but feels unsafe at this time  The pt did 2 superficial cuts on her left fore arm to cope with the depression and SI thoughts  The pt still feels that she will hurt herself if DC to home  The pt is a danger to herself  Pt offered and sign a [address] in agreement

## 2018-01-08 NOTE — ED ATTENDING ATTESTATION
Gauri Barreto MD, saw and evaluated the patient  I have discussed the patient with the resident/non-physician practitioner and agree with the resident's/non-physician practitioner's findings, Plan of Care, and MDM as documented in the resident's/non-physician practitioner's note, except where noted  All available labs and Radiology studies were reviewed  At this point I agree with the current assessment done in the Emergency Department  I have conducted an independent evaluation of this patient a history and physical is as follows:  Pt feels hopeless and helpless over the past 3 weeks Recent new meds for ocd with worsening sleep and depression  PE: alert nad heart reg lungs clear abd soft nontender   MDM: will consult crisis    Critical Care Time  CritCare Time    Procedures

## 2018-01-09 PROBLEM — F31.4 BIPOLAR 1 DISORDER, DEPRESSED, SEVERE (HCC): Status: ACTIVE | Noted: 2018-01-09

## 2018-01-09 LAB
25(OH)D3 SERPL-MCNC: 44 NG/ML (ref 30–100)
ALBUMIN SERPL BCP-MCNC: 3.9 G/DL (ref 3.5–5)
ALP SERPL-CCNC: 84 U/L (ref 46–116)
ALT SERPL W P-5'-P-CCNC: 19 U/L (ref 12–78)
ANION GAP SERPL CALCULATED.3IONS-SCNC: 7 MMOL/L (ref 4–13)
AST SERPL W P-5'-P-CCNC: 12 U/L (ref 5–45)
BASOPHILS # BLD AUTO: 0.05 THOUSANDS/ΜL (ref 0–0.1)
BASOPHILS NFR BLD AUTO: 0 % (ref 0–1)
BILIRUB SERPL-MCNC: 0.34 MG/DL (ref 0.2–1)
BUN SERPL-MCNC: 14 MG/DL (ref 5–25)
CALCIUM SERPL-MCNC: 9.6 MG/DL (ref 8.3–10.1)
CHLORIDE SERPL-SCNC: 106 MMOL/L (ref 100–108)
CHOLEST SERPL-MCNC: 153 MG/DL (ref 50–200)
CO2 SERPL-SCNC: 25 MMOL/L (ref 21–32)
CREAT SERPL-MCNC: 0.68 MG/DL (ref 0.6–1.3)
EOSINOPHIL # BLD AUTO: 0.59 THOUSAND/ΜL (ref 0–0.61)
EOSINOPHIL NFR BLD AUTO: 4 % (ref 0–6)
ERYTHROCYTE [DISTWIDTH] IN BLOOD BY AUTOMATED COUNT: 14.4 % (ref 11.6–15.1)
FOLATE SERPL-MCNC: >20 NG/ML (ref 3.1–17.5)
GFR SERPL CREATININE-BSD FRML MDRD: 115 ML/MIN/1.73SQ M
GLUCOSE P FAST SERPL-MCNC: 91 MG/DL (ref 65–99)
GLUCOSE SERPL-MCNC: 91 MG/DL (ref 65–140)
HCG SERPL QL: NEGATIVE
HCT VFR BLD AUTO: 39.9 % (ref 34.8–46.1)
HDLC SERPL-MCNC: 69 MG/DL (ref 40–60)
HGB BLD-MCNC: 12.9 G/DL (ref 11.5–15.4)
LDLC SERPL CALC-MCNC: 65 MG/DL (ref 0–100)
LYMPHOCYTES # BLD AUTO: 3.51 THOUSANDS/ΜL (ref 0.6–4.47)
LYMPHOCYTES NFR BLD AUTO: 26 % (ref 14–44)
MAGNESIUM SERPL-MCNC: 2.1 MG/DL (ref 1.6–2.6)
MCH RBC QN AUTO: 27.7 PG (ref 26.8–34.3)
MCHC RBC AUTO-ENTMCNC: 32.3 G/DL (ref 31.4–37.4)
MCV RBC AUTO: 86 FL (ref 82–98)
MONOCYTES # BLD AUTO: 0.85 THOUSAND/ΜL (ref 0.17–1.22)
MONOCYTES NFR BLD AUTO: 6 % (ref 4–12)
NEUTROPHILS # BLD AUTO: 8.58 THOUSANDS/ΜL (ref 1.85–7.62)
NEUTS SEG NFR BLD AUTO: 64 % (ref 43–75)
NRBC BLD AUTO-RTO: 0 /100 WBCS
PHOSPHATE SERPL-MCNC: 2.8 MG/DL (ref 2.7–4.5)
PLATELET # BLD AUTO: 371 THOUSANDS/UL (ref 149–390)
PMV BLD AUTO: 9.6 FL (ref 8.9–12.7)
POTASSIUM SERPL-SCNC: 4 MMOL/L (ref 3.5–5.3)
PROT SERPL-MCNC: 7.6 G/DL (ref 6.4–8.2)
RBC # BLD AUTO: 4.66 MILLION/UL (ref 3.81–5.12)
SODIUM SERPL-SCNC: 138 MMOL/L (ref 136–145)
T3 SERPL-MCNC: 0.9 NG/ML (ref 0.6–1.8)
T4 SERPL-MCNC: 8 UG/DL (ref 4.7–13.3)
TRIGL SERPL-MCNC: 95 MG/DL
TSH SERPL DL<=0.05 MIU/L-ACNC: 3.67 UIU/ML (ref 0.36–3.74)
VIT B12 SERPL-MCNC: 450 PG/ML (ref 100–900)
WBC # BLD AUTO: 13.62 THOUSAND/UL (ref 4.31–10.16)

## 2018-01-09 PROCEDURE — 82746 ASSAY OF FOLIC ACID SERUM: CPT | Performed by: PSYCHIATRY & NEUROLOGY

## 2018-01-09 PROCEDURE — 80053 COMPREHEN METABOLIC PANEL: CPT | Performed by: PSYCHIATRY & NEUROLOGY

## 2018-01-09 PROCEDURE — 84480 ASSAY TRIIODOTHYRONINE (T3): CPT | Performed by: PSYCHIATRY & NEUROLOGY

## 2018-01-09 PROCEDURE — 84436 ASSAY OF TOTAL THYROXINE: CPT | Performed by: PSYCHIATRY & NEUROLOGY

## 2018-01-09 PROCEDURE — 85025 COMPLETE CBC W/AUTO DIFF WBC: CPT | Performed by: PSYCHIATRY & NEUROLOGY

## 2018-01-09 PROCEDURE — 84703 CHORIONIC GONADOTROPIN ASSAY: CPT | Performed by: PSYCHIATRY & NEUROLOGY

## 2018-01-09 PROCEDURE — 82607 VITAMIN B-12: CPT | Performed by: PSYCHIATRY & NEUROLOGY

## 2018-01-09 PROCEDURE — 82652 VIT D 1 25-DIHYDROXY: CPT | Performed by: PSYCHIATRY & NEUROLOGY

## 2018-01-09 PROCEDURE — 80061 LIPID PANEL: CPT | Performed by: PSYCHIATRY & NEUROLOGY

## 2018-01-09 PROCEDURE — 84443 ASSAY THYROID STIM HORMONE: CPT | Performed by: PSYCHIATRY & NEUROLOGY

## 2018-01-09 PROCEDURE — 84100 ASSAY OF PHOSPHORUS: CPT | Performed by: PSYCHIATRY & NEUROLOGY

## 2018-01-09 PROCEDURE — 83735 ASSAY OF MAGNESIUM: CPT | Performed by: PSYCHIATRY & NEUROLOGY

## 2018-01-09 PROCEDURE — 82306 VITAMIN D 25 HYDROXY: CPT | Performed by: PSYCHIATRY & NEUROLOGY

## 2018-01-09 RX ORDER — CIPROFLOXACIN 500 MG/1
500 TABLET, FILM COATED ORAL EVERY 12 HOURS SCHEDULED
Status: COMPLETED | OUTPATIENT
Start: 2018-01-09 | End: 2018-01-09

## 2018-01-09 RX ORDER — LAMOTRIGINE 100 MG/1
300 TABLET ORAL
Status: DISCONTINUED | OUTPATIENT
Start: 2018-01-09 | End: 2018-01-11 | Stop reason: HOSPADM

## 2018-01-09 RX ORDER — LAMOTRIGINE 100 MG/1
350 TABLET ORAL
Status: DISCONTINUED | OUTPATIENT
Start: 2018-01-09 | End: 2018-01-09

## 2018-01-09 RX ORDER — CYCLOSPORINE 0.5 MG/ML
1 EMULSION OPHTHALMIC EVERY 12 HOURS SCHEDULED
Status: DISCONTINUED | OUTPATIENT
Start: 2018-01-10 | End: 2018-01-11 | Stop reason: HOSPADM

## 2018-01-09 RX ORDER — LORATADINE 10 MG/1
10 TABLET ORAL EVERY EVENING
Status: DISCONTINUED | OUTPATIENT
Start: 2018-01-10 | End: 2018-01-11 | Stop reason: HOSPADM

## 2018-01-09 RX ADMIN — CIPROFLOXACIN 500 MG: 500 TABLET, FILM COATED ORAL at 11:33

## 2018-01-09 RX ADMIN — LISINOPRIL 10 MG: 10 TABLET ORAL at 08:26

## 2018-01-09 RX ADMIN — ROPINIROLE 3 MG: 1 TABLET, FILM COATED ORAL at 08:26

## 2018-01-09 RX ADMIN — LURASIDONE HYDROCHLORIDE 120 MG: 80 TABLET, FILM COATED ORAL at 18:04

## 2018-01-09 RX ADMIN — SERTRALINE HYDROCHLORIDE 200 MG: 100 TABLET ORAL at 08:27

## 2018-01-09 RX ADMIN — LAMOTRIGINE 300 MG: 100 TABLET ORAL at 21:47

## 2018-01-09 RX ADMIN — LORATADINE 5 MG: 10 TABLET ORAL at 18:04

## 2018-01-09 RX ADMIN — LEVOTHYROXINE SODIUM 100 MCG: 100 TABLET ORAL at 06:27

## 2018-01-09 RX ADMIN — GABAPENTIN 900 MG: 300 CAPSULE ORAL at 21:47

## 2018-01-09 RX ADMIN — ROPINIROLE 3 MG: 1 TABLET, FILM COATED ORAL at 21:48

## 2018-01-09 RX ADMIN — CIPROFLOXACIN 500 MG: 500 TABLET, FILM COATED ORAL at 21:47

## 2018-01-09 RX ADMIN — LITHIUM CARBONATE 900 MG: 300 CAPSULE, GELATIN COATED ORAL at 21:47

## 2018-01-09 RX ADMIN — ACETAMINOPHEN 650 MG: 325 TABLET, FILM COATED ORAL at 11:40

## 2018-01-09 RX ADMIN — OMEPRAZOLE 20 MG: 20 CAPSULE, DELAYED RELEASE ORAL at 06:27

## 2018-01-09 NOTE — PROGRESS NOTES
Told Dr ZAMORA about patient taking Myrbetriq, how she takes Lamictal, Cipro and left toe "infection "

## 2018-01-09 NOTE — H&P
H&P Exam - Chris Lugo 34 y o  female MRN: [id number]    Unit/Bed#: OO6 759-01 Encounter: [id number]      Assessment/Plan     Assessment:  35 yo wf  1  Bipolar disorder  2  OCD  3  Suicidal ideation  4  Recent injury to left 5th toe with leukocytosis    Plan:  1  Psychiatric evaluation and treatment  2  Resume home meds  3  Repeat CBC in two days  4  Recheck left 5th toe    History of Present Illness     HPI:  Chris Lugo is a 34 y o  female who presents to [facility] ER for inpatient psychiatric admission  She states that she was seen at her therapist's office earlier in the day and was told she needed to go to [facility] which she agreed to go to at that time  She felt that her last session with her therapist was different than all her previous episodes, and she was told that her sessions are not going in the right direction  She felt blind-sided by the news and on the way home she thought more about going to [facility] and found herself unable to go and preferred inpatient treatment at this time  She also reports a change in medications by her outpatient psychiatrist recently which has been making it harder for her to sleep since late December  She feels that the lack of sleep is contributing to her depression and she has been thinking about suicide a lot lately  She denies suicidal plan at this time  She has a recent history of dropping a table on her left little toe which was evaluated at [address] [facility] and was started on a course of Cipro which she has completed  She reports continued mild redness but significantly improved pain with pain only when she bends her toe occasionally and denies pain with weight bearing  Review of Systems   Constitutional: Negative for activity change, appetite change, chills, fatigue and fever  HENT: Negative for congestion, postnasal drip, rhinorrhea, sinus pain, sinus pressure, sore throat and tinnitus  Eyes: Negative      Respiratory: Negative for cough, shortness of breath and wheezing  Cardiovascular: Negative for chest pain and leg swelling  Gastrointestinal: Negative  Endocrine: Negative  Genitourinary: Negative  Musculoskeletal: Negative  Skin: Negative  Allergic/Immunologic: Negative  Neurological: Negative  Hematological: Negative  Psychiatric/Behavioral: Negative  Historical Information   Past Medical History:   Diagnosis Date    Bipolar disorder ([facility] [address] )     Psychiatric disorder     Psychiatric illness      No past surgical history on file  Social History   History   Alcohol Use No     History   Drug Use No     History   Smoking Status    Never Smoker   Smokeless Tobacco    Never Used     Family History: non-contributory    Meds/Allergies   PTA meds:   Prior to Admission Medications   Prescriptions Last Dose Informant Patient Reported? Taking?    HYDROcodone-acetaminophen (NORCO) 5-325 mg per tablet Unknown at Unknown time  Yes No   Sig: Take 1 tablet by mouth every 6 (six) hours as needed for pain   gabapentin (NEURONTIN) 300 mg capsule 1/8/2018 at Unknown time  Yes Yes   Sig: Take 900 mg by mouth daily at bedtime   lamoTRIgine (LaMICtal) 200 MG tablet 1/8/2018 at 2000  Yes Yes   Sig: Take 350 mg by mouth daily   levocetirizine (XYZAL) 5 MG tablet 1/8/2018 at 0800  Yes Yes   Sig: Take 5 mg by mouth every evening   levothyroxine (LEVOXYL) 25 mcg tablet 1/8/2018 at 0800  Yes Yes   Sig: Take 25 mcg by mouth daily   levothyroxine 25 mcg tablet 1/8/2018 at 2000  Yes Yes   Sig: Take 25 mcg by mouth daily   lisinopril (ZESTRIL) 10 mg tablet 1/8/2018 at 2000  Yes Yes   Sig: Take 10 mg by mouth daily   lithium 300 MG tablet 1/8/2018 at 2000  Yes Yes   Sig: Take 900 mg by mouth 3 (three) times a day   lurasidone (LATUDA) 120 mg tablet 1/8/2018 at 0800  Yes Yes   Sig: Take 120 mg by mouth daily with breakfast   omeprazole (PriLOSEC) 20 mg delayed release capsule 1/8/2018 at 0800  Yes Yes   Sig: Take 20 mg by mouth daily rOPINIRole (REQUIP) 3 mg tablet 1/8/2018 at 0800  Yes Yes   Sig: Take 3 mg by mouth 3 (three) times a day   sertraline (ZOLOFT) 100 mg tablet 1/8/2018 at 0800  Yes Yes   Sig: Take 200 mg by mouth daily      Facility-Administered Medications: None     No Known Allergies    Objective   Vitals: Blood pressure 130/67, pulse 74, temperature 98 1 °F (36 7 °C), temperature source Oral, resp  rate 16, height 5' 8" (1 727 m), weight (!) 166 kg (365 lb), SpO2 97 %  No intake or output data in the 24 hours ending 01/09/18 0933    Invasive Devices          No matching active lines, drains, or airways          Physical Exam   Constitutional: She is oriented to person, place, and time  She appears well-developed and well-nourished  No distress  HENT:   Head: Normocephalic and atraumatic  Eyes: EOM are normal  Pupils are equal, round, and reactive to light  Neck: Normal range of motion  Cardiovascular: Normal rate and regular rhythm  Exam reveals no gallop and no friction rub  No murmur heard  Pulmonary/Chest: Effort normal  She has no wheezes  She has no rales  She exhibits no tenderness  Abdominal: Soft  Bowel sounds are normal  She exhibits no distension  There is no tenderness  Obese   Neurological: She is alert and oriented to person, place, and time  Skin: Skin is warm, dry and intact  Lab Results: I have personally reviewed pertinent reports  Imaging: I have personally reviewed pertinent reports  and I have personally reviewed pertinent films in PACS  EKG, Pathology, and Other Studies: I have personally reviewed pertinent reports        Code Status: Level 1 - Full Code

## 2018-01-09 NOTE — H&P
Psychiatric Evaluation - Behavioral Health     Identification Data:Gita Weeks Olp 34 y o  female MRN: [id number]  Unit/Bed#: PD1 759-01 Encounter: [id number]    Chief Complaint: "I wanted to die", "I thought I was losing my mind", "I don't want to go on living like this", depression, unstable mood and inability to care for self and suicidal ideations  History of Present Illness     Sylvie Moreno is a 34 y o  female with a history of bipolar disorder who was admitted to the inpatient psychiatric unit on a voluntary 201 commitment basis due to depression, inability to care for self, inability to care for self because of mental illness, complications and side effects of psychiatric medications including Luvox and multiple unsuccessful attempts at outpatient care  Such symptoms lead to developing of suicidal ideations and the patient fears she may act upon them  Symptoms prior to admission included worsening depression, depression, feeling depressed, hopelessness, helplessness, poor concentration, poor appetite and difficulty sleeping  Onset of symptoms was gradual starting several weeks ago with progressively worsening course since that time  Stressors preceding admission included medical problems and Medication changes  On initial evaluation after admission to the inpatient psychiatric unit the patient anxious, depressed, was restricted affect the  She described her recent struggle was the worsening of depression and insomnia after he Luvox was added to her medication regimen to treat her obsessive-compulsive disorder was predominantly intrusive thoughts and less compulsive behavioral   Worsening of insomnia and that to deeper depression and inability to function  The patient has intensive and extensive history of psychiatric disorder, including bipolar depression, and eating disorder with anorexia  The days psychiatric condition a complicated by was obsessive-compulsive disorder    A the patient stated that the the his symptoms started was the anorexia, and she received treatment to [facility] Disorder Clinic at [address] a, which led to inpatient hospitalization was ECT to treat her deep depression  The patient stated she was treated H him see in the year 2006 to 2007 his inpatient psych unit  Sin that the patient had other psychiatric hospitalization was followed by psychiatrist Shayy ruiz  Most recently the patient started receiving T him AS and switch to   about us for medication management as well  She tolerated him as rather well and that in the process of finishing up the course of 36 treatment  Her depression was under control with the patient still complained of some obsessive-compulsive thoughts was negativity is  The she also stated that in order to deal with such that she excessively and irrationally clean her house  She denied intrusive fears or compulsive arts  The the patient also had restless legs syndrome and taking Requip to help was that, and she is on 125 mg of Latuda to treat her bipolar depression  The he medications were reviewed and the patient stated that her doctor about this wanted to decrease her Lamictal from 400-300 mg and already decreased to 350  The patient identifies that his stress of some mainly financial, was the patient also feels socially isolated with a lack of support  She felt overwhelmed with feelings of guilt    Developed suicidal thoughts      Psychiatric Review Of Systems:    sleep changes: decreased  appetite changes: no  weight changes: no  energy/anergy: decreased  interest/pleasure/anhedonia: decreased  somatic symptoms: no  anxiety/panic: yes, worrying, worrying daily  celia: past mixed episodes  guilty/hopeless: yes  self injurious behavior/risky behavior: not recently  Suicidal ideation: yes  Homicidal ideation: no  Auditory hallucinations: no  Visual hallucinations: no  Other hallucinations: no  Delusional thinking: no  Eating disorder history: yes, denies currently, past symptoms of anorexia  Obsessive/compulsive symptoms: obsessive thoughts    Historical Information     Past Psychiatric History:     Past Inpatient Psychiatric Treatment:   Multiple past inpatient psychiatric admissions  Past Outpatient Psychiatric Treatment:    Was in outpatient psychiatric treatment in the past with a psychiatrist   Currently in outpatient psychiatric treatment with a psychiatrist   Past Suicide Attempts:    yes  Past Violent Behavior:    no  Past Psychiatric Medication Trials:    Depakote which was switched to lead him, the Lamictal, Neurontin, Requip, and most recently her Luvox, the patient had history of ECT treatment and also rTMS       Substance Abuse History:  Social History     Tobacco History     Smoking Status  Never Smoker    Smokeless Tobacco Use  Never Used          Alcohol History     Alcohol Use Status  No          Drug Use     Drug Use Status  No          Sexual Activity     Sexually Active  Not Asked          Activities of Daily Living    Not Asked               Additional Substance Use Detail     Questions Responses    Substance Use Assessment Denies substance use within the past 12 months    Alcohol Use Frequency Denies use in past 12 months    Heroin Frequency Denies use in past 12 months    Crack Cocaine Frequency Denies use in past 12 months    Methamphetamine Frequency Denies use in past 12 months    Benzodiazepine Frequency Denies use in past 12 months    Amphetamine frequency Denies use in past 12 months    Barbituate Frequency Denies use use in past 12 months    Opiate frequency Denies use in past 12 months    Last reviewed by Katey Foley RN on 1/8/2018        I have assessed this patient for substance use within the past 12 months    Alcohol use: denies current use  Recreational drug use:   Cocaine:  denies use  Heroin:  denies use  Marijuana:  denies use  Other drugs: denies use   History of Inpatient/Outpatient rehabilitation program: no  Smoking history: denies use    Family Psychiatric History:     Psychiatric Illness: Mother - depression  Substance Abuse:  patient denies  Suicide Attempts: Mother - suicide attempts    Social History:    Education: high school diploma/GED  Marital History: single      Traumatic History:     Abuse: not willing to provide details    Past Medical History:    History of Seizures: no  History of Head injury with loss of consciousness: no    Past Medical History:   Diagnosis Date    Bipolar disorder ([facility] )     Psychiatric disorder     Psychiatric illness      No past surgical history on file  Medical Review Of Systems:    Respiratory: negative  Cardiovascular: negative  Gastrointestinal: negative  Musculoskeletal:negative  Neurological: negative  Endocrine: negative    Allergies:    No Known Allergies    Medications: All current active medications have been reviewed      Objective     Vital signs in last 24 hours:    Temp:  [98 1 °F (36 7 °C)-98 4 °F (36 9 °C)] 98 1 °F (36 7 °C)  HR:  [58-83] 58  Resp:  [16-18] 16  BP: (114-172)/(56-74) 114/56    No intake or output data in the 24 hours ending 01/09/18 1511     Mental Status Evaluation:      Appearance:  dressed appropriately, casually dressed, overweight   Behavior:  cooperative, calm   Mood:  depressed   Affect: constricted    Speech:  decreased rate   Language: appropriate   Thought Process:  concrete   Associations: concrete associations   Thought Content:  negative thinking   Perceptual Disturbances: no auditory hallucinations, no visual hallucinations, does not appear responding to internal stimuli   Risk Potential: Suicidal ideation - Yes, without plan  Homicidal ideation - None  Potential for aggression - No   Sensorium:  oriented to person, place and time   Memory:  recent and remote memory grossly intact   Consciousness:  alert and awake   Attention: attention span and concentration are normal Intellect: normal   Fund of Knowledge: awareness of current events appropriate   Insight:  impaired   Judgment: limited   Muscle Tone: normal   Gait/Station: normal gait/station and normal balance   Motor Activity: no abnormal movements               Laboratory Results:   I have personally reviewed all pertinent laboratory/tests results  Most Recent Labs:   Lab Results   Component Value Date    WBC 13 62 (H) 01/09/2018    RBC 4 66 01/09/2018    HGB 12 9 01/09/2018    HCT 39 9 01/09/2018     01/09/2018    RDW 14 4 01/09/2018    NEUTROABS 8 58 (H) 01/09/2018     01/09/2018    K 4 0 01/09/2018     01/09/2018    CO2 25 01/09/2018    BUN 14 01/09/2018    CREATININE 0 68 01/09/2018    GLUCOSE 91 01/09/2018    CALCIUM 9 6 01/09/2018    AST 12 01/09/2018    ALT 19 01/09/2018    ALKPHOS 84 01/09/2018    PROT 7 6 01/09/2018    ALBUMIN 3 9 01/09/2018    BILITOT 0 34 01/09/2018    CHOL 153 01/09/2018    HDL 69 (H) 01/09/2018    TRIG 95 01/09/2018    LDLCALC 65 01/09/2018    LITHIUM 0 6 08/03/2017    FTM7CMBBEICX 3 670 01/09/2018    FREET4 1 00 08/03/2017    PREGSERUM Negative 01/09/2018       Imaging Studies: Xr Foot 3+ Vw Left    Result Date: 12/31/2017  Narrative: LEFT FOOT INDICATION:  Left foot pain  COMPARISON: None VIEWS:  3 IMAGES:  3 FINDINGS: Pes planus  Otherwise, normal alignment  No dislocation  Normal mineralization  No fracture or focal lytic/blastic lesion  Large plantar spur at the calcaneus  No degenerative changes  No periarticular erosions  No acute soft tissue swelling demonstrated  Impression: 1  No acute osseous abnormality  2   Chronic findings including pes planus and plantar spur   Workstation performed: [id number]       Code Status: Level 1 - Full Code  Advance Directive and Living Will: <no information>    Assessment/Plan   Principal Problem:    Bipolar 1 disorder, depressed, severe ([facility] )      Patient Strengths: cooperative, communication skills, interpersonal skills, negotiates basic needs, patient is on a voluntary commitment     Patient Barriers: lack of social/family support, limited education, limited family ties, no/few hobbies or interests, unresourceful    Treatment Plan:     Planned Treatment and Medication Changes: All current active medications have been reviewed  Continue treatment with group therapy, milieu therapy and occupational therapy  Behavioral Health checks every 15 minutes  Restart patient's medications, will further reduce Lamictal to 300 mg has a higher dose of Lamictal may indeed lead to some irritability and the even insomnia, and stop to walks which may contribute to worsening of insomnia and worsening of restless leg syndrome  Will observe the patient monitor him mood swings in the and depression  ciprofloxacin 500 mg Oral Q12H YFN   gabapentin 900 mg Oral HS   lamoTRIgine 350 mg Oral HS   levothyroxine 100 mcg Oral Early Morning   lisinopril 10 mg Oral Daily   lithium carbonate 900 mg Oral HS   loratadine 5 mg Oral QPM   lurasidone 120 mg Oral Daily With Dinner   omeprazole 20 mg Oral Early Morning   rOPINIRole 3 mg Oral HS   rOPINIRole 3 mg Oral QAM   sertraline 200 mg Oral Daily       Risks / Benefits of Treatment:    Risks, benefits, and possible side effects of medications explained to patient and patient verbalizes understanding and agreement for treatment  Counseling / Coordination of Care:    Patient's presentation on admission and proposed treatment plan discussed with treatment team   Importance of medication and treatment compliance reviewed with patient  Outpatient follow up discussed with patient  Supportive therapy provided to patient      Inpatient Psychiatric Certification:    Estimated length of stay: 5 midnights    Based upon physical, mental and social evaluations, I certify that inpatient psychiatric services are medically necessary for this patient for a duration of 5 midnights for the treatment of Bipolar 1 disorder, depressed, severe ([facility] )    Available alternative community resources do not meet the patient's mental health care needs  I further attest that an established written individualized plan of care has been implemented and is outlined in the patient's medical records  ** Please Note: This note has been constructed using a voice recognition system   **

## 2018-01-09 NOTE — ED NOTES
Pt told CW that he roommate and friend Ray Cope is doing her rounding for her Nurse practitioner [facility] with Dr Reji Mora  The pt is okay with still possibly  going to [facility] The pt is requesting to go to [facility] because she was treat by Winsome Donato in the past  CW faxed pt 201 to [facility] for consideration

## 2018-01-09 NOTE — PSYCH
Progress Note  Psychotherapy Provided [facility]: Individual Psychotherapy 50 minutes provided today  Goals addressed in session:   1-3 D: Met with pt for Individual Therapy session  Elizabeth spoke today about her feelings re: her successful assertive interaction with her mother, her increased spending on her mother's credit card, and struggles to manage her OCD thinking  A: Frantz has continued to realize how mentally unwell her mother is and how this has contributed / prevented her mental health  Frantz continues to struggles most with managing her own ocd  P: Upcoming sessions will be used to continue to address the above  She will continue to attend both weekly Trauma and ED group therapy sessions  Pain Scale and Suicide Risk [facility]: Current Pain Assessment: moderate to severe   On a scale of 0 to 10, the patient rates current pain at 5   Behavioral Health Treatment Plan [facility]: Diagnosis and Treatment Plan explained to patient, patient relates understanding diagnosis and is agreeable to Treatment Plan  Assessment    1  Binge-eating disorder, severe (783 6) (F50 81)   2  Bipolar I disorder, most recent episode mixed, moderate (296 62) (F31 62)   3  Obsessive-compulsive disorder (300 3) (F42 9)   4   EILEEN (generalized anxiety disorder) (300 02) (F41 1)    Signatures   Electronically signed by : Lynn Mariscal LCSW; Apr 10 2017  9:59AM EST                       (Author)

## 2018-01-09 NOTE — CASE MANAGEMENT
CM met with patient  Patient was calm, cooperative, insightful during session  States her depression has been occurring for years, with highs and lows  Reports hx of bulimia when depression was very high in [id number]  OP treatment team consists of Dr Sintia Triana at [facility] and OP through [address]  Mother is a stressor for her due to her mental health issues and negative thought patterns towards patient  Self injurious behaviors in past hx and recent hx where she cut left forearm, two superficial cuts at location  Recent addition of Luvox for OCD tendencies and [address] through OP may be cause of lack of sleep and increased depression, as per patient  Sleep is both disrupted and taking longer for her to fall asleep  Would like to continue her current treatment, does not wish to partake in [address], as previously signed up for  Signed treatment plan and ROIs for brother, PCP, OP mental health [facility], OP mental health [facility]'s  CM will continue to monitor

## 2018-01-09 NOTE — PSYCH
Progress Note  Psychotherapy Provided [facility]: Individual Psychotherapy 50 minutes provided today  Goals addressed in session:   1-3 D: Met with pt for Individual Therapy session  Elizabeth spoke today about her feelings re: needing to give in to her OCD and not being capable of delaying, refusing it as doing so would "only make it worse "   A: Elizabeth remained open to expressing herself today, but was more despondent re: the above and unable to accept feedback on alternative ways of managing her OCD today      P: Upcoming sessions will be used to continue to address the above  She will continue to attend both weekly Trauma and ED group therapy sessions  Pain Scale and Suicide Risk [facility]: On a scale of 0 to 10, the patient rates current pain at 4   Behavioral Health Treatment Plan [address] [place] [address] [address]: Diagnosis and Treatment Plan explained to patient, patient relates understanding diagnosis and is agreeable to Treatment Plan  Assessment    1  Binge-eating disorder, severe (783 6) (F50 81)   2  Bipolar I disorder, most recent episode depressed, in full remission (296 56) (F31 76)   3  EILEEN (generalized anxiety disorder) (300 02) (F41 1)   4   Obsessive-compulsive disorder (300 3) (F42 9)    Signatures   Electronically signed by : John Carter LCSW; Jan 4 2017  4:40PM EST                       (Author)

## 2018-01-09 NOTE — PSYCH
Progress Note  Psychotherapy Provided [facility]: Family Therapy provided today  Goals addressed in session:   1-3 D: Met with pt and her mother for a Family Therapy session  Elizabeth began session by admitting that she "woke up today with suicidal thoughts" to which her mother responded hostily and aggressively  With support, mother and daughter were able to express their frustrations with their living situation  A: Elizabeth was relieved for this worker to witness her mother's emotional explosion as it validated what she experiences in the home when she attempts to express herself, her feelings and her needs  P: Upcoming sessions will be used to continue to address the above  Pain Scale and Suicide Risk [facility]: Current Pain Assessment: moderate to severe   On a scale of 0 to 10, the patient rates current pain at 6   Current suicide risk is low   Assessment    1  Bipolar 1 disorder, depressed, moderate (296 52) (F31 32)   2   Obsessive-compulsive disorder (300 3) (F42)    Signatures   Electronically signed by : Harper Norton LCSW; Sep 16 2016 12:06PM EST                       (Author)

## 2018-01-09 NOTE — PSYCH
Progress Note  Psychotherapy Provided [facility]: Individual Psychotherapy 50 minutes provided today  Goals addressed in session:   1-3 D: Met with pt for Individual Therapy session  Elizabeth spoke today about her feelings re: volunteering, working towards independence today  She shared her fears while also developing a plan to make contact with a local agency to assist over the holidays  A: Elizabeth was once again extremely open and insightful as she processed the above  She was again willing to discuss her fear in confronting her father, as well  P: Upcoming sessions will be used to continue to address the above  She will continue to attend both weekly Trauma and ED group therapy sessions  Pain Scale and Suicide Risk [facility]: Current Pain Assessment: moderate to severe   On a scale of 0 to 10, the patient rates current pain at 4   Behavioral Health Treatment Plan [facility]: Diagnosis and Treatment Plan explained to patient, patient relates understanding diagnosis and is agreeable to Treatment Plan  Assessment    1  Binge-eating disorder, severe (783 6) (F50 81)   2  Bipolar I disorder, most recent episode mixed, moderate (296 62) (F31 62)   3   EILEEN (generalized anxiety disorder) (300 02) (F41 1)    Signatures   Electronically signed by : Hima Tan LCSW; Nov 7 2016 10:04AM EST                       (Author)

## 2018-01-09 NOTE — ED NOTES
[facility] by Dr Ana Rosa Garcia  Nurse to nurse report needs to be called to [facility] at [phone]

## 2018-01-09 NOTE — PROGRESS NOTES
Patient admitted from [facility]  As a voluntary patient  She has had multiple previous psychiatric admissions but has not been admitted for approx 4 yrs and has been doing reasonably well she thinks until she started having TMS treatments  She also started Luvox 25mgs 12/20 and her sleep has been completely disrupted since then  She believes that has contributed to her depression  Her psychiatrist is in [place] and will not return until 1/19 however she was told by the nurse at the [place] office she can stop taking it  Her last dose was last p m  She also cut herself x 2 superficially on her left forearm with a utility knife  She cut last week also and before that she cut in the summer  She has a history of more extensive cutting in the past   She has had the same therapist for 9 yrs and was told by her today she has to go to [facility] or she would not be able to continue treating her  Patient said she cannot tolerate the idea of going to [facility] so "I called her up and told her I was going in patient "  She has suicidal ideas but no plan "and I have a fear of death so I couldn't do anything "  She dropped a table on her foot and has been on Cipro 500mgs BID since New Years Maegan because she was told it may be infected - that will require to be followed up on

## 2018-01-09 NOTE — PSYCH
Progress Note  Psychotherapy Provided [facility]: Individual Psychotherapy 50 minutes provided today  Goals addressed in session:   1-3 D: Met with Elizabeth for Individual Therapy session  Elizabeth spoke further today about her feelings re: her move in with a peer and her need to striclty adhere to a budget in order to be able to afford her bills in order to live independently from her parents  A: Elizabeth struggled to open up to this worker about her feelings today as she felt ashamed and overwhelmed  With support and prompting, she was able to begin to disclose feeling surrounding menstruation that are upsetting her  P: Upcoming sessions will be used to continue to address the above  She will continue to attend both weekly Trauma and ED group therapy sessions  Pain Scale and Suicide Risk [facility]: Current Pain Assessment: moderate to severe   On a scale of 0 to 10, the patient rates current pain at 7   Current suicide risk is low   Behavioral Health Treatment Plan [facility]: Diagnosis and Treatment Plan explained to patient, patient relates understanding diagnosis and is agreeable to Treatment Plan  Assessment    1  Binge-eating disorder, severe (783 6) (F50 81)   2  EILEEN (generalized anxiety disorder) (300 02) (F41 1)   3  Gambling disorder, moderate (312 31) (F63 0)   4  Obsessive-compulsive disorder (300 3) (F42 9)   5  PTSD (post-traumatic stress disorder) (309 81) (F43 10)   6   Severe bipolar I disorder, most recent episode depressed (296 53) (F31 4)    Signatures   Electronically signed by : Jil Daniels LCSW; Nov 24 2017 11:24AM EST                       (Author)

## 2018-01-09 NOTE — PLAN OF CARE
Problem: SELF HARM/SUICIDALITY  Goal: Will have no self-injury during hospital stay  INTERVENTIONS:  - Q 7 MINUTES: Routine safety checks  - Q WAKING SHIFT & PRN: Assess risk to determine if routine checks are adequate to maintain patient safety  - Encourage patient to participate actively in care by formulating a plan to combat response to suicidal ideation, identify supports and resources   Outcome: Progressing      Problem: DEPRESSION  Goal: Will be euthymic at discharge  INTERVENTIONS:  - Administer medication as ordered  - Provide emotional support via 1:1 interaction with staff  - Encourage involvement in milieu/groups/activities  - Monitor for social isolation   Outcome: Progressing      Problem: SLEEP DISTURBANCE  Goal: Will exhibit normal sleeping pattern  Interventions:  -  Assess the patients sleep pattern, noting recent changes  - Administer medication as ordered  - Decrease environmental stimuli, including noise, as appropriate during the night  - Encourage the patient to actively participate in unit groups and or exercise during the day to enhance ability to achieve adequate sleep at night  - Assess the patient, in the morning, encouraging a description of sleep experience   Outcome: Progressing      Problem: DISCHARGE PLANNING  Goal: Discharge to home or other facility with appropriate resources  INTERVENTIONS:  - Identify barriers to discharge w/patient and caregiver  - Arrange for needed discharge resources and transportation as appropriate  - Identify discharge learning needs (meds, wound care, etc )  - Arrange for interpretive services to assist at discharge as needed  - Refer to Case Management Department for coordinating discharge planning if the patient needs post-hospital services based on physician/advanced practitioner order or complex needs related to functional status, cognitive ability, or social support system   Outcome: Progressing

## 2018-01-09 NOTE — PROGRESS NOTES
Gave Tylenol for headache and it was effective  Left small toe has no redness, no swelling and no pain upon palpation  No signs and symptoms of any infection

## 2018-01-09 NOTE — PROGRESS NOTES
Patient denies SI and does admit to feeling depressed but is able to contract for safety at this time  Patient spoke to me about how she feels like, "Cutting " when she is upset with herself  Patient also told me about the events which led her here  Patient was able to state that she would not "cut" herself here and would come to staff when she felt the urge  Patient also explained about her medications and the way she takes them at home  I told Dr ZAMORA about the medications that were discussed and I also mentioned her left little toe infection  Patient stated that she was on her last dose of Cipro tonight

## 2018-01-10 LAB
BASOPHILS # BLD AUTO: 0.05 THOUSANDS/ΜL (ref 0–0.1)
BASOPHILS NFR BLD AUTO: 0 % (ref 0–1)
EOSINOPHIL # BLD AUTO: 0.58 THOUSAND/ΜL (ref 0–0.61)
EOSINOPHIL NFR BLD AUTO: 4 % (ref 0–6)
ERYTHROCYTE [DISTWIDTH] IN BLOOD BY AUTOMATED COUNT: 14.3 % (ref 11.6–15.1)
HCT VFR BLD AUTO: 39.6 % (ref 34.8–46.1)
HGB BLD-MCNC: 12.7 G/DL (ref 11.5–15.4)
LYMPHOCYTES # BLD AUTO: 3.42 THOUSANDS/ΜL (ref 0.6–4.47)
LYMPHOCYTES NFR BLD AUTO: 24 % (ref 14–44)
MCH RBC QN AUTO: 27.6 PG (ref 26.8–34.3)
MCHC RBC AUTO-ENTMCNC: 32.1 G/DL (ref 31.4–37.4)
MCV RBC AUTO: 86 FL (ref 82–98)
MONOCYTES # BLD AUTO: 1.05 THOUSAND/ΜL (ref 0.17–1.22)
MONOCYTES NFR BLD AUTO: 7 % (ref 4–12)
NEUTROPHILS # BLD AUTO: 9.05 THOUSANDS/ΜL (ref 1.85–7.62)
NEUTS SEG NFR BLD AUTO: 65 % (ref 43–75)
NRBC BLD AUTO-RTO: 0 /100 WBCS
PLATELET # BLD AUTO: 357 THOUSANDS/UL (ref 149–390)
PMV BLD AUTO: 9.7 FL (ref 8.9–12.7)
RBC # BLD AUTO: 4.6 MILLION/UL (ref 3.81–5.12)
WBC # BLD AUTO: 14.22 THOUSAND/UL (ref 4.31–10.16)

## 2018-01-10 PROCEDURE — 85025 COMPLETE CBC W/AUTO DIFF WBC: CPT | Performed by: PHYSICIAN ASSISTANT

## 2018-01-10 RX ORDER — GABAPENTIN 300 MG/1
900 CAPSULE ORAL
Status: DISCONTINUED | OUTPATIENT
Start: 2018-01-10 | End: 2018-01-11 | Stop reason: HOSPADM

## 2018-01-10 RX ORDER — ROPINIROLE 1 MG/1
3 TABLET, FILM COATED ORAL
Status: DISCONTINUED | OUTPATIENT
Start: 2018-01-10 | End: 2018-01-11 | Stop reason: HOSPADM

## 2018-01-10 RX ADMIN — OMEPRAZOLE 20 MG: 20 CAPSULE, DELAYED RELEASE ORAL at 06:08

## 2018-01-10 RX ADMIN — LEVOTHYROXINE SODIUM 100 MCG: 100 TABLET ORAL at 06:08

## 2018-01-10 RX ADMIN — LISINOPRIL 10 MG: 10 TABLET ORAL at 08:17

## 2018-01-10 RX ADMIN — GABAPENTIN 900 MG: 300 CAPSULE ORAL at 20:00

## 2018-01-10 RX ADMIN — SERTRALINE HYDROCHLORIDE 200 MG: 100 TABLET ORAL at 08:17

## 2018-01-10 RX ADMIN — CYCLOSPORINE 1 DROP: 0.5 EMULSION OPHTHALMIC at 08:18

## 2018-01-10 RX ADMIN — HYDROCODONE BITARTRATE AND ACETAMINOPHEN 1 TABLET: 5; 325 TABLET ORAL at 08:23

## 2018-01-10 RX ADMIN — LORATADINE 10 MG: 10 TABLET ORAL at 17:08

## 2018-01-10 RX ADMIN — CYCLOSPORINE 1 DROP: 0.5 EMULSION OPHTHALMIC at 20:02

## 2018-01-10 RX ADMIN — LAMOTRIGINE 300 MG: 100 TABLET ORAL at 21:09

## 2018-01-10 RX ADMIN — ROPINIROLE 3 MG: 1 TABLET, FILM COATED ORAL at 08:17

## 2018-01-10 RX ADMIN — LITHIUM CARBONATE 900 MG: 300 CAPSULE, GELATIN COATED ORAL at 21:09

## 2018-01-10 RX ADMIN — ROPINIROLE 3 MG: 1 TABLET, FILM COATED ORAL at 20:00

## 2018-01-10 RX ADMIN — LURASIDONE HYDROCHLORIDE 120 MG: 80 TABLET, FILM COATED ORAL at 16:24

## 2018-01-10 NOTE — PSYCH
Progress Note  Psychotherapy Provided [facility]: Group Therapy provided today  Goals addressed in session:   1 D: PT  was seen for Group Therapy session  Pts engaged in an activity / discussion re: anxiety and exposure therapy as it relates to recovery      A: Elizabeth was unable to engage in this activity due to overwhelming amounts of anxiety  She spoke about the anxiety she experienced during the activity and her inability to manage it  She is always supportive of her peers in group and responds fairly positively to gentle pressure to share more with peers  P: Pt will continue to attend individual therapy weekly and the Adult ED group on a biweekly basis  Pain Scale and Suicide Risk [facility]: On a scale of 0 to 10, the patient rates current pain at 4   Behavioral Health Treatment Plan [facility]: Diagnosis and Treatment Plan explained to patient, patient relates understanding diagnosis and is agreeable to Treatment Plan  Assessment    1   Binge-eating disorder, severe (783 6) (N82 90)    Signatures   Electronically signed by : Kerin Carrel, LCSW; Feb 17 2017  3:34PM EST                       (Author)

## 2018-01-10 NOTE — PSYCH
Progress Note  Psychotherapy Provided [facility]: Individual Psychotherapy 50 minutes provided today  Goals addressed in session:   1-3 D: Met with pt for Individual Therapy session  Elizabeth spoke today about her feelings re: her progress in completing her homework  More work was done with exposure therapy to continue to address her OCD  Homework was again assigned  Her struggles to control her behaviors were also explored  A: Frantz continues to develop trust in this worker as she shares her feelings and behaviors during sessions  She is working with a new Psychiatrist and has continues to [employer] in therapy  P: Upcoming sessions will be used to continue to address the above  She will continue to attend both weekly Trauma and ED group therapy sessions  Pain Scale and Suicide Risk [facility]: Current Pain Assessment: moderate to severe   On a scale of 0 to 10, the patient rates current pain at 3   Behavioral Health Treatment Plan [facility]: Diagnosis and Treatment Plan explained to patient, patient relates understanding diagnosis and is agreeable to Treatment Plan  Assessment    1  Bipolar I disorder, most recent episode mixed, moderate (296 62) (F31 62)   2  Binge-eating disorder, severe (783 6) (F50 81)   3  Obsessive-compulsive disorder (300 3) (F42 9)   4   EILEEN (generalized anxiety disorder) (300 02) (F41 1)    Signatures   Electronically signed by : Felix Perez LCSW; Apr 28 2017 12:41PM EST                       (Author)

## 2018-01-10 NOTE — PSYCH
Progress Note  Psychotherapy Provided [facility]: Group Therapy provided today  Goals addressed in session:   1 D: Pt attended Trauma Group  Pts engaged in an activity and discussion on the topic of self compassion  A: Elizabeth was able to admit that she struggles with feeling compassionate towards herself, but is working on improvement in this area at this time  She appeared to enjoy today's discussion  P: Pt will continue to be seen for individual and group therapy during which the above will be further processed  Pain Scale and Suicide Risk [facility]: On a scale of 0 to 10, the patient rates current pain at 6   Behavioral Health Treatment Plan [facility]: Diagnosis and Treatment Plan explained to patient, patient relates understanding diagnosis and is agreeable to Treatment Plan  Assessment    1   EILEEN (generalized anxiety disorder) (300 02) (F41 1)    Signatures   Electronically signed by : Calli Garcia LCSW; Nov 2 2016 11:59AM EST                       (Author)

## 2018-01-10 NOTE — PSYCH
Sylvie Moreno 34 y o  female MRN: [id number]  Unit/Bed#: PL2 759-01 Encounter: [id number]          [address] 34 y o  1985 female MRN: [id number]    [address] [place] [address] Room / Bed: [place] [address]/[facility] 759-01 Encounter: [id number]          Admit Date/Time:  1/8/2018  5:20 PM    Treatment Team: Attending Provider: Sha Wright MD; Patient Care Technician: Regino Steiner; : Mary Sierra; Patient Care Assistant: Shmuel Woodson; Patient Care Technician: Arely Hernandez; Registered Nurse: Ruchi Dsouaz RN; Patient Care Technician: Cristóbal Montero;  Patient Care Technician: Kalia Daly    Diagnosis: Principal Problem:    Bipolar 1 disorder, depressed, severe ([facility] [address] )    Patient Strengths: cooperative, communication skills, interpersonal skills, negotiates basic needs, patient is on a voluntary commitment     Patient Barriers: lack of social/family support, limited education, limited family ties, no/few hobbies or interests, unresourceful      Short Term Goals: decrease in depressive symptoms, decrease in anxiety symptoms, decrease in suicidal thoughts, ability to stay safe on the unit    Long Term Goals: improvement in depression, resolution of depressive symptoms, stabilization of mood, free of suicidal thoughts, improved reality testing, improved insight    Progress Towards Goals: starting psychiatric medications as prescribed    Recommended Treatment: medication management, patient medication education, group therapy, milieu therapy, continued Behavioral Health psychiatric evaluation/assessment process     Treatment Frequency: daily medication monitoring, group and milieu therapy daily, monitoring through interdisciplinary rounds, monitoring through weekly patient care conferences    Expected Discharge Date:  4-6 days    Discharge Plan: referral for outpatient medication management with a psychiatrist, referral for outpatient psychotherapy, return to previous living arrangement    Treatment Plan Created/Updated By: Marizol Souza MD

## 2018-01-10 NOTE — PSYCH
Psych Med Mgmt    Appearance: was calm and cooperative, adequate hygiene and grooming and good eye contact  Observed mood: mood appropriate  Observed mood: affect appropriate  Speech: a normal rate  Thought processes: coherent/organized  Hallucinations: no hallucinations present  Thought Content: no delusions  Abnormal Thoughts: The patient has no suicidal thoughts and no homicidal thoughts  Orientation: The patient is oriented to person, place and time  Recent and Remote Memory: short term memory intact and long term memory intact  Attention Span And Concentration: concentration impaired  Insight: Insight intact  Judgment: Her judgment was intact  Muscle Strength And Tone  Muscle strength and tone were normal  Normal gait and station  Language: no difficulty naming common objects, no difficulty repeating a phrase and no difficulty writing a sentence  Fund of knowledge: Patient displays adequate knowledge of current events, adequate fund of knowledge regarding past history and adequate fund of knowledge regarding vocabulary  The patient is experiencing no localized pain  Treatment Recommendations: Continue Lithium 900 mg at bedtime  Continue Ativan 0 5 mg tid PRN  Continue Latuda 120 mg in the evening  Continue Lamictal 400 mg at bedtime  Continue Prozac 20 mg daily  Continue therapy with Jarrod Sanchez; also attends eating disorders group  Follows with PCP for glucose and lipid monitoring  Risks, Benefits And Possible Side Effects Of Medications: Risks, benefits, and possible side effects of medications explained to patient and patient verbalizes understanding, Risks of medications explained if female patient  Patient verbalizes understanding and agrees to notify her doctor if she becomes pregnant  She reports normal appetite, normal energy level, recent 5 lbs weight gain  and normal number of sleep hours       Eugene Blackmon states she has been doing much better since last visit  Feels her mood swings are controlled better, also obsessive thoughts and anxiety are improved  No significant depressive symptoms at present  No suicidal or homicidal ideation  No psychotic symptoms  Sleep is improved  Appetite is good  No side effects from medications reported  No rash  Vitals  Signs   Recorded: 70HJJ4477 72:10HD   Systolic: 475  Diastolic: 84  Heart Rate: 71  Height: 5 ft 8 in  Weight: 373 lb   BMI Calculated: 56 71  BSA Calculated: 2 66  BP Cuff Size: Extra-Large    Assessment    1  Binge-eating disorder, severe (783 6) (F50 81)   2  EILEEN (generalized anxiety disorder) (300 02) (F41 1)   3  Obsessive-compulsive disorder (300 3) (F42 9)   4  Bipolar I disorder, most recent episode depressed, in full remission (296 56) (F31 76)    Plan    1  Follow-up Visit in 4 Weeks Evaluation and Treatment  Follow-up  Status: Hold For -   Scheduling  Requested for: 05BXJ0768    Review of Systems    Constitutional: recent 5 lb weight gain  Cardiovascular: no complaints of slow or fast heart rate, no chest pain, no palpitations  Respiratory: no complaints of shortness of breath, no wheezing, no dyspnea on exertion  Gastrointestinal: no complaints of abdominal pain, no constipation, no nausea, no diarrhea, no vomiting  Genitourinary: no complaints of dysuria, no incontinence, no pelvic pain, no urinary frequency  Musculoskeletal: no complaints of arthralgia, no myalgias, no limb pain, no joint stiffness  Integumentary: no complaints of skin rash, no itching, no dry skin  Neurological: no complaints of headache, no confusion, no numbness, no dizziness  Past Psychiatric History    Past Psychiatric History: Multiple past inpatient psychiatric admissions  One past suicide attempt by overdose in 2009  Substance Abuse Hx    Substance Abuse History: No drug or alcohol use  Active Problems    1  Abnormal weight gain (783 1) (R63 5)   2   Acute diverticulitis (562 11) (K57 92)   3  Acute pain (338 19) (R52)   4  Allergic rhinitis (477 9) (J30 9)   5  Amenorrhea (626 0) (N91 2)   6  Binge-eating disorder, severe (783 6) (F50 81)   7  Bronchitis (490) (J40)   8  Bulging lumbar disc (722 10) (M51 26)   9  Cough (786 2) (R05)   10  Dizziness (780 4) (R42)   11  Dysfunctional uterine bleeding (626 8) (N93 8)   12  Dysuria (788 1) (R30 0)   13  Encounter for routine gynecological examination (V72 31) (Z01 419)   14  Esophageal reflux (530 81) (K21 9)   15  Foot pain (729 5) (M79 673)   16  EILEEN (generalized anxiety disorder) (300 02) (F41 1)   17  Gambling disorder, moderate (312 31) (F63 0)   18  Hypertension (401 9) (I10)   19  Hypothyroidism (244 9) (E03 9)   20  Long term current use of therapeutic drug (V58 69) (Z79 899)   21  Lumbago (724 2) (M54 5)   22  Lumbar degenerative disc disease (722 52) (M51 36)   23  Menorrhagia (626 2) (N92 0)   24  Morbid or severe obesity due to excess calories (278 01) (E66 01)   25  Obsessive-compulsive disorder (300 3) (F42 9)   26  Obstructive sleep apnea (327 23) (G47 33)   27  Otalgia, unspecified laterality (388 70) (H92 09)   28  Prediabetes (790 29) (R73 09)   29  RLS (restless legs syndrome) (333 94) (G25 81)   30  Urinary tract infection (599 0) (N39 0)   31  URTI (acute upper respiratory infection) (465 9) (J06 9)   32  Viral bronchitis (466 0) (J20 8)   33  Vitamin D deficiency (268 9) (E55 9)    Past Medical History    1  History of Abscess of face (682 0) (L02 01)   2  History of Acute nonsuppurative otitis media, unspecified laterality (381 00) (H65 199)   3  History of Backache (724 5) (M54 9)   4  History of Candidiasis, cutaneous (112 3) (B37 2)   5  History of Dog bite (879 8,E906 0) (W54  0XXA)   6  History of Dysfunction of left eustachian tube (381 81) (H69 82)   7  History of acute sinusitis (V12 69) (Z87 09)   8  Denied: History of head injury   9  History of Pseudotumor cerebri (348 2) (G93 2)   10   Denied: History of Seizures   11  History of Suicide and self-inflicted injury (U229 8) (Z27 5AIH)    The active problems and past medical history were reviewed and updated today  Allergies    1  No Known Drug Allergies    Current Meds   1  Accu-Chek Martha Device; test 4 times daily; Therapy: 25SGW3255 to (Last Rx:96Bkf8948)  Requested for: 10BHA5754 Ordered   2  Accu-Chek Martha STRP; TEST 4 TIMES DAILY; Therapy: 74ZHG9786 to (Evaluate:46Fda1685)  Requested for: 05JSF2256; Last   Rx:38Mbk7025 Ordered   3  Accu-Chek Soft Touch Lancets Miscellaneous; test blood sugar four times daily; Therapy: 90MFE8631 to (Evaluate:2016)  Requested for: 08SMY9025; Last   Rx:76Hbj6617 Ordered   4  Azithromycin 250 MG Oral Tablet; TAKE 2 TABLETS ON DAY 1 THEN TAKE 1 TABLET A   DAY FOR 4 DAYS; Therapy: 10WWF6088 to (Last Rx:2016)  Requested for: 73AUE6576 Ordered   5  Benzonatate 200 MG Oral Capsule; TAKE 1 CAPSULE 2-3 TIMES DAILY; Therapy: 36XFW9481 to (Evaluate:2016)  Requested for: 26CJB7177; Last   Rx:2016 Ordered   6  FLUoxetine HCl - 20 MG Oral Capsule; TAKE 1 CAPSULE DAILY; Therapy: 34URZ3497 to (NBCEEPS20JRW1049)  Requested for: 94YNS6573; Last   Rx:68Lki8584 Ordered   7  Hydrocodone-Acetaminophen 5-325 MG Oral Tablet; TAKE 1 TABLET 3 times daily PRN   as needed for pain; Therapy: 63ONV8205 to (Evaluate:2014); Last Rx:2014 Ordered   8  LamoTRIgine 200 MG Oral Tablet; TAKE 2 TABLETS AT BEDTIME; Therapy: 73FNW6729 to (YCAQSVDJ:43ZJR2023)  Requested for: 26XLF1007; Last   Rx:2016 Ordered   9  Latuda 120 MG Oral Tablet; TAKE 1 TABLET IN THE EVENING WITH FOOD; Therapy: 75Pgn5259 to (Evaluate:2017)  Requested for: 88NYP0932; Last   Rx:2016 Ordered   10  Levocetirizine Dihydrochloride 5 MG Oral Tablet; TAKE 1 TABLET BY MOUTH ONCE A    DAY; Therapy: 52XYE1098 to (Evaluate:07Xoq6245)  Requested for: 2016; Last    Rx:2016 Ordered   11   Levothyroxine Sodium 100 MCG Oral Tablet; take 1 tablet by mouth every day; Therapy: 04DTA7799 to (Evaluate:31Ecf6709)  Requested for: 44GFW3734; Last    Rx:09Nov2016 Ordered   12  Lisinopril 10 MG Oral Tablet; TAKE 1 TABLET BY MOUTH EVERY DAY FOR BLOOD    PRESSURE; Therapy: 50YSJ4750 to (Evaluate:26Qjq5174)  Requested for: 23GFQ3917; Last    Rx:95Mxb0478 Ordered   13  Lithium Carbonate  MG Oral Tablet Extended Release; TAKE 2 TABLETS AT    BEDTIME; Therapy: 50PAV9920 to (Evaluate:31Jan2017)  Requested for: 35QMO9731; Last    Rx:12Ooj4145 Ordered   14  LORazepam 0 5 MG Oral Tablet; Take 1 tablet 3 times daily  as needed; Therapy: 69Etm4153 to (Evaluate:31Jan2017); Last Rx:68Aui9309 Ordered   15  MetFORMIN HCl  MG Oral Tablet Extended Release 24 Hour; TAKE 1 TABLET BY    MOUTH EVERY DAY WITH SUPPER; Therapy: 27LQK5641 to (Evaluate:01Mar2017)  Requested for: 91Rwk9685; Last    Rx:35Gxs0240 Ordered   16  Omeprazole 20 MG Oral Capsule Delayed Release; TAKE ONE CAPSULE BY MOUTH    TWICE A DAY; Therapy: 27QDL4114 to (Claudia Knowles)  Requested for: 15HTL0089; Last    Rx:31Oct2016 Ordered   17  Proventil  (90 Base) MCG/ACT Inhalation Aerosol Solution; 2 PUFFS Q 4 HRS; Therapy: 14OWO2636 to (Last ZH:41FPW2815)  Requested for: 61KZT9500 Ordered   18  ROPINIRole HCl - 0 25 MG Oral Tablet; TAKE 2 TABLETS BY MOUTH DAILY AT BEDTIME; Therapy: 80Yzy3973 to (Evaluate:22Jan2017)  Requested for: 96BFD2926; Last    Rx:90Pac9412 Ordered    The medication list was reviewed and updated today  Family Psych History  Mother    1  Family history of bicuspid aortic valve (V17 49) (Z82 79)   2  Family history of depression (V17 0) (Z81 8)   3  Family history of Hypertension (V17 49)   4  Family history of Status post aortic valve replacement  Father    5  Family history of Hypertension (V17 49)   6  Family history of Hypothyroidism   7  No family history of mental disorder  Brother    8   Family history of Hypertension (V17 49)  Maternal Grandmother    9  Family history of Cancer   10  Family history of cerebrovascular accident (V17 1) (Z82 3)   11  Family history of Heart Disease (V17 49)  Paternal Grandmother    15  Family history of Cancer   13  Family history of malignant neoplasm (V16 9) (Z80 9)  Maternal Grandfather    15  Family history of pneumonia (V18 8) (Z83 1)  Paternal Grandfather    13  Family history of pneumonia (V18 8) (Z83 1)  Family History    12  Family history of Arthritis (716 90) (M19 90)   17  Family history of Breast Cancer (V16 3)   18  Family history of osteoporosis (V17 81) (Z82 62)   19  Family history of Hypertension (V17 49)   20  Family history of Transient Ischemic Attack    The family history was reviewed and updated today  Social History    · Caffeine Use   · Currently sexually active   · Disabled   · Former smoker (W65 25) (W67 712)   · High school graduate   · Lives with parents   · Never smoked   · No alcohol use   · Tea  The social history was reviewed and updated today  The social history was reviewed and is unchanged  Single, lives with mother and stepfather  No children  Some college  On disability  Worked in past in retail  No history of legal problems  No  history  History Of Phys/Sex Abuse Or Perpetration    History Of Phys/Sex Abuse or Perpetration: No history of physical or sexual abuse  End of Encounter Meds    1  Levocetirizine Dihydrochloride 5 MG Oral Tablet; TAKE 1 TABLET BY MOUTH ONCE A   DAY; Therapy: 78Tle9702 to (Evaluate:92Svv1326)  Requested for: 76Kqo6406; Last   Rx:86Tqq7819 Ordered    2  LamoTRIgine 200 MG Oral Tablet; TAKE 2 TABLETS AT BEDTIME; Therapy: 61DQC3219 to (SQDSSWII:11CAF7603)  Requested for: 59YWM4609; Last   Rx:97Lod2398 Ordered   3  Latuda 120 MG Oral Tablet; TAKE 1 TABLET IN THE EVENING WITH FOOD; Therapy: 95Whh0291 to (Evaluate:31Jan2017)  Requested for: 19CBO9895; Last   Rx:24Hpl0116 Ordered   4   Lithium Carbonate  MG Oral Tablet Extended Release; TAKE 2 TABLETS AT   BEDTIME; Therapy: 76VWF3998 to (Evaluate:31Jan2017)  Requested for: 87SFS9535; Last   Rx:03Oct2016 Ordered    5  FLUoxetine HCl - 20 MG Oral Capsule; TAKE 1 CAPSULE DAILY; Therapy: 28KJL5815 to (SSUOYHQF:51YJT6733)  Requested for: 91PHQ4332; Last   Rx:03Oct2016 Ordered    6  Azithromycin 250 MG Oral Tablet; TAKE 2 TABLETS ON DAY 1 THEN TAKE 1 TABLET A   DAY FOR 4 DAYS; Therapy: 12JCY2320 to (Last Rx:08Oct2016)  Requested for: 38NIH3190 Ordered   7  Benzonatate 200 MG Oral Capsule; TAKE 1 CAPSULE 2-3 TIMES DAILY; Therapy: 97FNG7017 to (Evaluate:13Oct2016)  Requested for: 44VRQ3346; Last   Rx:08Oct2016 Ordered    8  Omeprazole 20 MG Oral Capsule Delayed Release; TAKE ONE CAPSULE BY MOUTH   TWICE A DAY; Therapy: 69MMU4150 to (Christine Avitia)  Requested for: 75PXF9048; Last   Rx:31Oct2016 Ordered    9  LORazepam 0 5 MG Oral Tablet; Take 1 tablet 3 times daily  as needed; Therapy: 61Zoc2494 to (Evaluate:31Jan2017); Last Rx:03Oct2016 Ordered    10  Lisinopril 10 MG Oral Tablet; TAKE 1 TABLET BY MOUTH EVERY DAY FOR BLOOD    PRESSURE; Therapy: 65XPE4349 to (Evaluate:00Dqi6691)  Requested for: 60PGP0810; Last    Rx:10Oct2016 Ordered    11  Levothyroxine Sodium 100 MCG Oral Tablet (Synthroid); take 1 tablet by mouth every day; Therapy: 70VES9075 to (Evaluate:41Ivr8103)  Requested for: 91QTL6925; Last    Rx:09Nov2016 Ordered    12  Hydrocodone-Acetaminophen 5-325 MG Oral Tablet; TAKE 1 TABLET 3 times daily PRN    as needed for pain; Therapy: 24QOJ0002 to (Evaluate:28Nov2014); Last Rx:31Oct2014 Ordered    13  Accu-Chek Martha Device; test 4 times daily; Therapy: 02CRY8719 to (Last Rx:32Cty0629)  Requested for: 36FIG6848 Ordered   14  Accu-Chek Martha STRP; TEST 4 TIMES DAILY; Therapy: 10YIY7477 to (Evaluate:30Pus2387)  Requested for: 27ZIP4659; Last    Rx:73Hfp5306 Ordered   15   Accu-Chek Soft Touch Lancets Miscellaneous; test blood sugar four times daily; Therapy: 09NLC8899 to (Evaluate:13Apr2016)  Requested for: 21HRH9057; Last    Rx:11Jir6917 Ordered   16  MetFORMIN HCl  MG Oral Tablet Extended Release 24 Hour; TAKE 1 TABLET BY    MOUTH EVERY DAY WITH SUPPER; Therapy: 85WVS4365 to (Juan Chiang)  Requested for: 26Yty4017; Last    Rx:66Kia9957 Ordered    17  ROPINIRole HCl - 0 25 MG Oral Tablet; TAKE 2 TABLETS BY MOUTH DAILY AT BEDTIME; Therapy: 96Whh0970 to (Evaluate:22Jan2017)  Requested for: 21WHH3861; Last    Rx:62Pza9882 Ordered    18  Proventil  (90 Base) MCG/ACT Inhalation Aerosol Solution; 2 PUFFS Q 4 HRS; Therapy: 21CBR4193 to (Last RB:19QZW4794)  Requested for: 49OKF6656 Ordered    Future Appointments    Date/Time Provider Specialty Site   12/16/2016 04:00 PM PAUL Arellano   Psychiatry [facility]   12/20/2016 08:00 AM eSrgei Ellington DO Family Medicine [address] [place] [address]   11/22/2016 11:00 AM Bárbara Pineda, [address] [facility]   12/01/2016 01:00 PM Bárbara Pineda, [address] [facility]   12/06/2016 05:00 PM Bárbara Pineda, [address] [facility]   12/08/2016 09:00 AM Bárbara Pineda, [address] [facility]   12/09/2016 01:00 PM Bárbara Pineda, [address] [facility]   12/13/2016 05:00 PM [facility]   12/15/2016 01:00 PM [facility]   12/20/2016 05:00 PM Bárbara Pineda [address] [facility]   12/22/2016 01:00 PM Bárbara Pineda [place]     Signatures   Electronically signed by : PAUL Tiwari ; Nov 18 2016  3:21PM EST                       (Author)

## 2018-01-10 NOTE — PSYCH
Progress Note  Psychotherapy Provided [facility]: Group Therapy provided today  Goals addressed in session:   1 D: Pt attended Trauma Group  Pts engaged in an emotional discussion on the Characteristics of Shame Based Relationships  The theme of suicidalty was introduced and processed, as well      A: Elizabeth actively participated today both supporting her peers and sharing her own feelings on this very serious topic  P: Pt will continue to be seen for individual and group therapy during which the above will be further processed  Behavioral Health Treatment Plan [facility]: Diagnosis and Treatment Plan explained to patient, patient relates understanding diagnosis and is agreeable to Treatment Plan  Assessment    1   EILEEN (generalized anxiety disorder) (300 02) (F41 1)    Signatures   Electronically signed by : Jeet Lay LCSW; Feb 24 2017 12:43PM EST                       (Author)

## 2018-01-10 NOTE — PSYCH
Progress Note  Psychotherapy Provided [facility]: Individual Psychotherapy 50 minutes provided today  Goals addressed in session:   1-3 D: Met with pt for Individual Therapy session  Elizabeth spoke today about her feelings re: her relationship with her mother as well as her father  Her thoughts fo suicidality were also discussed  A: Elizabeth acknowledged that life at home has been difficult  She denied a plan or intent and her relationship with her dog is extremely important to her  P: Upcoming sessions will be used to continue to address the above  She will continue to attend both weekly Trauma and ED group therapy sessions  Pain Scale and Suicide Risk [facility]: Current Pain Assessment: moderate to severe   On a scale of 0 to 10, the patient rates current pain at 3   Current suicide risk is low   Behavioral Health Treatment Plan [facility]: Diagnosis and Treatment Plan explained to patient, patient relates understanding diagnosis and is agreeable to Treatment Plan  Assessment    1  Binge-eating disorder, severe (783 6) (F50 81)   2  Bipolar I disorder, most recent episode mixed, moderate (296 62) (F31 62)   3  EILEEN (generalized anxiety disorder) (300 02) (F41 1)   4   Obsessive-compulsive disorder (300 3) (F42 9)    Signatures   Electronically signed by : [facility], BRYNN; May 24 2017  2:03PM EST                       (Author)

## 2018-01-10 NOTE — PSYCH
Progress Note  Psychotherapy Provided [facility]: Individual Psychotherapy 50 minutes provided today  Goals addressed in session:   1-3 D: Met with pt for Individual Therapy session  Elizabeth spoke today about her feelings re: her conflicted feelings about volunteering and working  Her Treatment Plan was also updated  A: Elizabeth was less anxious and depressed today as the above was discussed, however she also acknowledged that she attempts to "put on a good face" for this worker  P: Upcoming sessions will be used to continue to address the above  She will continue to attend both weekly Trauma and ED group therapy sessions  Pain Scale and Suicide Risk [facility]: On a scale of 0 to 10, the patient rates current pain at 4   Behavioral Health Treatment Plan [facility]: Diagnosis and Treatment Plan explained to patient, patient relates understanding diagnosis and is agreeable to Treatment Plan  Assessment    1  Bipolar I disorder, most recent episode depressed, mild (296 51) (F31 31)   2  Binge-eating disorder, severe (783 6) (F50 81)   3  EILEEN (generalized anxiety disorder) (300 02) (F41 1)   4   Obsessive-compulsive disorder (300 3) (F42 9)    Signatures   Electronically signed by : Jeet Lay LCSW; Feb 2 2017  1:58PM EST                       (Author)

## 2018-01-10 NOTE — PSYCH
Date of Initial Treatment Plan: 7/31/2008  Date of Current Treatment Plan: 5/26/2016  Treatment Plan 15  Strengths/Personal Resources for Self Care: Determined, intelligent, and gets along well with others  Diagnosis:   Axis I: ED, NOS, OCD, Bipolar D/O     Current Challenges/Problems/Needs: I continue to engage in behaviors that trigger my addictions  I illogically compare myself to others  I have incredibly high anxiety and control issues  Long Term Goals:   I want to decrease the frequency of acting out  Target Date: 9/26/2016      I want to accept myself   Target Date: 9/26/2016      I want to learn to live a more calm and less anxious life  Target Date: 9/26/2016      Short Term Objectives:   Goal 1:   I will talk honestly about my addictive behaviors  Target Date: 9/26/2016      Goal 2:   I will discuss my fears of asserting myself  I will continue to build friendships  Target Date: 9/26/2016      Goal 3:   I will talk about my compulsivity and anxiety  I will continue to let things go  I will continue to practice relaxing  Target Date: 9/26/2016      GOAL 1: Modality: Individual 1 x per month Target Date: 9/26/2016   GOAL 1: Modality: Group 4 x per month Target Date: 9/26/2016        GOAL 2: Modality: Individual 2 x per month Target Date: 9/26/2016   GOAL 2: Modality: Group 4 x per month Target Date: 9/26/2016        GOAL 3: Modality: Individual 2 x per month Target Date: 9/26/2016   GOAL 3: Modality: Group 4 x per month Target Date: 9/26/2016        The first scheduled review date is 9/26/2016  The expected length of service is 6 mos                      CLIENT COMMENTS / Please share your thoughts, feelings, need and/or experiences regarding your treatment plan: _____________________________________________________________________________________________________________________________________________________________________________________________________________________________________________________________________________________________________________________ Date/Time: ______________     Patient Signature: _________________________________ Date/Time: ______________        1  Abnormal weight gain (783 1) (R63 5)   2  Acute diverticulitis (562 11) (K57 92)   3  Acute pain (338 19) (R52)   4  Allergic rhinitis (477 9) (J30 9)   5  Amenorrhea (626 0) (N91 2)   6  Bipolar I disorder, most recent episode mixed, moderate (296 62) (F31 62)   7  Bulging lumbar disc (722 10) (M51 26)   8  Dizziness (780 4) (R42)   9  Dysfunctional uterine bleeding (626 8) (N93 8)   10  Dysuria (788 1) (R30 0)   11  Eating disorder, unspecified (307 50) (F50 9)   12  Encounter for routine gynecological examination (V72 31) (Z01 419)   13  Esophageal reflux (530 81) (K21 9)   14  Foot pain (729 5) (M79 673)   15  EILEEN (generalized anxiety disorder) (300 02) (F41 1)   16  Gambling disorder, moderate (312 31) (F63 0)   17  Hypertension (401 9) (I10)   18  Hypothyroidism (244 9) (E03 9)   19  Lumbago (724 2) (M54 5)   20  Lumbar degenerative disc disease (722 52) (M51 36)   21  Menorrhagia (626 2) (N92 0)   22  Morbid or severe obesity due to excess calories (278 01) (E66 01)   23  Obsessive-compulsive disorder (300 3) (F42)   24  Obstructive sleep apnea (327 23) (G47 33)   25  Otalgia, unspecified laterality (388 70) (H92 09)   26  Prediabetes (790 29) (R73 09)   27  Urinary tract infection (599 0) (N39 0)   28  URTI (acute upper respiratory infection) (465 9) (J06 9)   29  Viral bronchitis (466 0) (J20 8)   30   Vitamin D deficiency (268 9) (E55 9)     Electronically signed by : Bettina Rosario LCSW; May 26 2016  1:38PM EST                       (Author)

## 2018-01-10 NOTE — PSYCH
Progress Note  Psychotherapy Provided [facility]: Individual Psychotherapy 50 minutes and Telephone Contact provided today  Goals addressed in session:   1: Pt was seen today for Individual Therapy session  Pt spoke about her feelings re: last group therapy session and how she was triggered by negative comments made by another group member  Pt shared how she managed her urges to self injure by spending time with her dog  A: Emily Geiger has made considerable progress in developing and utilizing coping skills over the past year  Her gambling has been reduced but remains present and will b=continue to be addressed  P:Elizabeth will continue to attend Individual and Group therapy on a weekly basis  Pain Scale and Suicide Risk [facility]: On a scale of 0 to 10, the patient rates current pain at 3   Current suicide risk is low   Behavioral Health Treatment Plan [facility]: Diagnosis and Treatment Plan explained to patient, patient relates understanding diagnosis and is agreeable to Treatment Plan  Assessment    1  Bipolar I disorder, most recent episode depressed, in partial remission (296 55)   (F31 75)   2  Eating disorder, unspecified (307 50) (F50 9)   3   Obsessive-compulsive disorder (300 3) (F42)    Signatures   Electronically signed by : [facility], [facility]; Jul 8 2016 12:09PM EST                       (Author)

## 2018-01-10 NOTE — DISCHARGE INSTR - OTHER ORDERS
[facility], [facility] [address]  [address]  [address] [facility]  [place], [address]  [phone]    Please go to your group therapy session on Tuesday, January 16, 2018 at 5pm  Please attend your individual therapy session on Friday, January 19, 2018 at 1 [facility]   [address] [place] [address], [address]  [phone]    Please call the above agency for follow up services      [address] [facility], [address] [facility]  [place], [address]  [phone]    Please go to your follow up appointment on Friday, January 19, 2018 at 1:30pm

## 2018-01-10 NOTE — PROGRESS NOTES
Pt denies SI, Hi and is coherent when speaking to RN  Pt stated she has all her appointments set up for out patient already and does not see why she is not being d/c  Pt has been social,visible and interactive in the milieu

## 2018-01-10 NOTE — PROGRESS NOTES
Patient denies feeling depressed, no suicidal ideations and no hallucinations at this time  Patient is fixated on getting her medications such as Myrbetriq  Patient called her friend to bring in her supply from home patient stated, "I need to start taking it or I will have problems  "

## 2018-01-10 NOTE — PROGRESS NOTES
Progress Note - [facility] 34 y o  female MRN: @MRN   Unit/Bed#: IK3 759-01 Encounter: [id number]        The patient was seen for continuing care and reviewed with staff  Report from staff regarding this patient received and discussed, and records reviewed prior to seeing this patient   Not suicidal today, Less obsessed with intrusive thoughts  Felt asleep after taking prn medication at night  Her affect brightened up  The patient denied any suicidal ideations  Patient remains anxious, appropriate, better, cooperative with milieu and cooperative with staff today  Medication side effects: No   ROS: no complaints    Mental Status Evaluation:    Appearance:  dressed appropriately, casually dressed   Behavior:  cooperative   Mood:  improved, anxious   Affect: appropriate, less constricted, constricted    Speech:  normal rate and volume, normal pitch, fluent   Language: appropriate   Thought Process:  normal   Associations: concrete associations   Thought Content:  normal   Perceptual Disturbances: no auditory hallucinations, no visual hallucinations, does not appear responding to internal stimuli   Risk Potential: Suicidal ideation - None  Homicidal ideation - None  Potential for aggression - No   Sensorium:  oriented to person, place and time   Memory:  recent and remote memory grossly intact   Consciousness:  alert and awake   Attention: attention span and concentration are normal   Intellect: normal   Fund of Knowledge: awareness of current events appropriate   Insight:  improved   Judgment: improved   Muscle Tone: normal   Gait/Station: normal gait/station and normal balance   Motor Activity: no abnormal movements         Laboratory results:  I have personally reviewed all pertinent laboratory results      Recent Labs      01/09/18   0649  01/09/18   0650   NA  138   --    K  4 0   --    CL  106   --    CO2  25   --    GLUCOSE  91   --    CREATININE  0 68   --    BUN  14   -- MG  2 1   --    PHOS   --   2 8     Recent Labs      01/09/18   0650  01/10/18   0535   WBC  13 62*  14 22*   RBC  4 66  4 60   HGB  12 9  12 7   HCT  39 9  39 6   MCV  86  86   MCH  27 7  27 6   RDW  14 4  14 3   PLT  371  357     Recent Labs      01/09/18   0649   CREATININE  0 68   BUN  14   NA  138   K  4 0   CL  106   CO2  25   GLUCOSE  91   PROT  7 6   ALT  19   AST  12     Recent Labs      01/09/18   0649   ALKPHOS  84   AST  12   ALT  19   BILITOT  0 34   ALBUMIN  3 9     No results for input(s): TROPONINI, CKMB, CKTOTAL in the last 72 hours  Invalid input(s): PBNP  Recent Labs      01/09/18   0649   CHOL  153   HDL  69*   TRIG  95     No results for input(s): CRP, SEDRATE, JANE, HAV, HEPAIGM, HEPBIGM, HEPBCAB, HEPCAB in the last 72 hours  Invalid input(s): HEAG    CBC:   Recent Labs      01/09/18   0650  01/10/18   0535   WBC  13 62*  14 22*   RBC  4 66  4 60   HGB  12 9  12 7   HCT  39 9  39 6   PLT  371  357     BMP:   Recent Labs      01/09/18   0649   NA  138   K  4 0   CL  106   CO2  25   BUN  14           Progress Toward Goals: improving progressively    Assessment/Plan   Principal Problem:    Bipolar 1 disorder, depressed, severe (HCC)      Recommended Treatment:  Because the patient's sleep improved despite uncomfortable bed, the patient condition overall is improving as well  She expressed her desire to continue  treatment and her outpatient treatment with psychiatrist and pt's improved condition is likely will reach baseline soon  Will continue present medications, the patient is improving  Medical consult was requested to follow up increased white blood cells which are more increase than typical for patient who are on lithium  Planned medication and treatment changes: All current active medications have been reviewed  Continue treatment with group therapy, milieu therapy, occupational therapy and medication management        Risks / Benefits of Treatment:    Risks, benefits, and possible side effects of medications explained to patient and patient verbalizes understanding and agreement for treatment  Counseling / Coordination of Care:    Patient's progress discussed with staff in treatment team meeting  Medication changes reviewed with staff in treatment team meeting  Recent stressors discussed with patient  Coping with stress discussed with patient  Crisis/safety plan discussed with patient  Discharge plan discussed with patient  ** Please Note: This note has been constructed using a voice recognition system   **

## 2018-01-10 NOTE — PSYCH
Progress Note  Psychotherapy Provided [facility]: Group Therapy provided today  Goals addressed in session:   1 D: The above was seen for Group Therapy session  Group members engaged in a discussion re: mindful eating vs diet mentality  A:Elizabeth was open in sharing her experiences and feelings re: the above  P: She will be encouraged to continue to attend individual therapy weekly and the Adult ED group on a biweekly basis  Pain Scale and Suicide Risk [facility]: On a scale of 0 to 10, the patient rates current pain at 4   Behavioral Health Treatment Plan [facility]: Diagnosis and Treatment Plan explained to patient, patient relates understanding diagnosis and is agreeable to Treatment Plan  Assessment    1   Binge-eating disorder, severe (783 6) (W73 11)    Signatures   Electronically signed by : [facility], [facility]; Nov 24 2017 11:41AM EST                       (Author)

## 2018-01-10 NOTE — PSYCH
Psych Med Mgmt    Appearance: was calm and cooperative, adequate hygiene and grooming and good eye contact  Observed mood: was dysphoric and anxious  Observed mood: affect was constricted  Speech: a normal rate  Thought processes: coherent/organized  Hallucinations: no hallucinations present  Thought Content: no delusions  Abnormal Thoughts: The patient has no suicidal thoughts and no homicidal thoughts  Orientation: The patient is oriented to person, place and time  Recent and Remote Memory: short term memory intact and long term memory intact  Attention Span And Concentration: concentration impaired  Insight: Insight intact  Judgment: Her judgment was intact  Muscle Strength And Tone  Muscle strength and tone were normal  Normal gait and station  Language: no difficulty naming common objects  Fund of knowledge: Patient displays adequate knowledge of current events, adequate fund of knowledge regarding past history and adequate fund of knowledge regarding vocabulary  The patient is experiencing moderate to severe pain  On a scale of 0 - 10 the pain severity is a 3  Treatment Recommendations: Continue Lithium 900 mg at bedtime  Continue Ativan 0 5 mg tid PRN  Start Latuda 20 mg in the evening, titrate dose  Decrease Abilify 15 mg at bedtime and taper off gradually  Continue Lamictal 400 mg at bedtime  Continue low dose Prozac 20 mg daily - patient feels it helps with OCD symptoms and would like to continue Prozac  Continue therapy with Calli Garcia; also attends eating disorders group  Follows with PCP for glucose and lipid monitoring  Risks, Benefits And Possible Side Effects Of Medications: Risks, benefits, and possible side effects of medications explained to patient and patient verbalizes understanding, Risks of medications explained if female patient  Patient verbalizes understanding and agrees to notify her doctor if she becomes pregnant       She reports increased appetite, normal energy level, recent 3 lbs weight gain  and decrease in number of sleep hours 7  Patient states was experiencing manic symptoms on higher Prozac dose - went on shopping spree with mother's credit card, was not sleeping  Called here on March 23 and her Prozac was decreased  Currently taking only 20 mg per day  Feels better now, but still has anxiety  Also now some depression  States sleep still fluctuates  No suicidality or homicidality  No psychotic symptoms  No other side effects from medications reported  No rash  Labs reviewed: Lithium level is therapeutic at 0 7 on 3/11/2016  CMP, TSH normal         Vitals  Signs [Data Includes: Current Encounter]   Recorded: 21Apr2016 03:06PM   Heart Rate: 89  Systolic: 486  Diastolic: 72  BP Cuff Size: Extra-Large  Height: 5 ft 8 in  Weight: 371 lb   BMI Calculated: 56 41  BSA Calculated: 2 66    Assessment    1  Eating disorder, unspecified (307 50) (F50 9)   2  EILEEN (generalized anxiety disorder) (300 02) (F41 1)   3  Obsessive-compulsive disorder (300 3) (F42)   4  Bipolar I disorder, most recent episode mixed, severe without psychotic features   (296 63) (F31 63)    Plan    1  Latuda 20 MG Oral Tablet; Take 1 tablet in the evening with food   2  From  ARIPiprazole 30 MG Oral Tablet TAKE 1 TABLET AT BEDTIME To   ARIPiprazole 15 MG Oral Tablet TAKE 1 TABLET AT BEDTIME   3  LamoTRIgine 200 MG Oral Tablet; TAKE 2 TABLETS AT BEDTIME   4  Lithium Carbonate  MG Oral Tablet Extended Release; TAKE 2 TABLETS   AT BEDTIME   5  Follow-up visit in 2 weeks Evaluation and Treatment  Follow-up  Status: Hold For -   Scheduling  Requested for: 21Apr2016    6  From  FLUoxetine HCl - 20 MG Oral Capsule TAKE 3 CAPSULES DAILY To   FLUoxetine HCl - 20 MG Oral Capsule TAKE 1 CAPSULE DAILY    7  LORazepam 0 5 MG Oral Tablet; Take 1 tablet 3 times daily  as needed    Review of Systems    Constitutional: recent 3 lb weight gain     Cardiovascular: no complaints of slow or fast heart rate, no chest pain, no palpitations  Respiratory: no complaints of shortness of breath, no wheezing, no dyspnea on exertion  Gastrointestinal: no complaints of abdominal pain, no constipation, no nausea, no diarrhea, no vomiting  Genitourinary: no complaints of dysuria, no incontinence, no pelvic pain, no urinary frequency  Musculoskeletal: no complaints of arthralgia, no myalgias, no limb pain, no joint stiffness  Integumentary: no complaints of skin rash, no itching, no dry skin  Neurological: no complaints of headache, no confusion, no numbness, no dizziness  Past Psychiatric History    Past Psychiatric History: Multiple past inpatient psychiatric admissions  One past suicide attempt by overdose in 2009  Substance Abuse Hx    Substance Abuse History: No drug or alcohol use  Active Problems    1  Abnormal weight gain (783 1) (R63 5)   2  Acute diverticulitis (562 11) (K57 92)   3  Acute pain (338 19) (R52)   4  Allergic rhinitis (477 9) (J30 9)   5  Amenorrhea (626 0) (N91 2)   6  Bulging lumbar disc (722 10) (M51 26)   7  Dizziness (780 4) (R42)   8  Dysfunctional uterine bleeding (626 8) (N93 8)   9  Dysuria (788 1) (R30 0)   10  Eating disorder, unspecified (307 50) (F50 9)   11  Encounter for routine gynecological examination (V72 31) (Z01 419)   12  Esophageal reflux (530 81) (K21 9)   13  Foot pain (729 5) (M79 673)   14  EILEEN (generalized anxiety disorder) (300 02) (F41 1)   15  Gambling disorder, moderate (312 31) (F63 0)   16  Hypertension (401 9) (I10)   17  Hypothyroidism (244 9) (E03 9)   18  Lumbago (724 2) (M54 5)   19  Lumbar degenerative disc disease (722 52) (M51 36)   20  Menorrhagia (626 2) (N92 0)   21  Morbid or severe obesity due to excess calories (278 01) (E66 01)   22  Obsessive-compulsive disorder (300 3) (F42)   23  Obstructive sleep apnea (327 23) (G47 33)   24  Otalgia, unspecified laterality (388 70) (H92 09)   25   Prediabetes (790 29) (R73 09)   26  Urinary tract infection (599 0) (N39 0)   27  URTI (acute upper respiratory infection) (465 9) (J06 9)   28  Viral bronchitis (466 0) (J20 8)   29  Vitamin D deficiency (268 9) (E55 9)    Past Medical History    1  History of Abscess of face (682 0) (L02 01)   2  History of Acute nonsuppurative otitis media, unspecified laterality (381 00) (H65 199)   3  History of Backache (724 5) (M54 9)   4  History of Candidiasis, cutaneous (112 3) (B37 2)   5  History of Dog bite (879 8,E906 0) (W54  0XXA)   6  History of Dysfunction of left Eustachian tube (381 81) (H69 82)   7  History of acute sinusitis (V12 69) (Z87 09)   8  Denied: History of head injury   9  History of Pseudotumor cerebri (348 2) (G93 2)   10  Denied: History of Seizures   11  History of Suicide and self-inflicted injury (T170 5) (X90 7AOL)    The active problems and past medical history were reviewed and updated today  Allergies    1  No Known Drug Allergies    Current Meds   1  Accu-Chek Martha Device; test 4 times daily; Therapy: 29VDG4310 to (Last Rx:59Bep5858)  Requested for: 86NKH6980 Ordered   2  Accu-Chek Martha In Vitro Strip; TEST 4 TIMES DAILY; Therapy: 24MMC2033 to (Evaluate:26Vgh6517)  Requested for: 13XNS1560; Last   Rx:59Nvl8782 Ordered   3  Accu-Chek Soft Touch Lancets Miscellaneous; test blood sugar four times daily; Therapy: 80GFY8367 to (Evaluate:37Lxk4939)  Requested for: 92QMV2697; Last   Rx:18Rlf5628 Ordered   4  ARIPiprazole 30 MG Oral Tablet; TAKE 1 TABLET AT BEDTIME  Requested for:   19UNB3078; Last Rx:11Jan2016 Ordered   5  Ciprofloxacin HCl - 500 MG Oral Tablet; TAKE 1 TABLET EVERY 12 HOURS DAILY; Therapy: 52Xsk2782 to (Evaluate:07Mck7309)  Requested for: 21Qwz3764; Last   Rx:75Ahz4662 Ordered   6  FLUoxetine HCl - 20 MG Oral Capsule; TAKE 3 CAPSULES DAILY; Therapy: 86MGN8271 to (Evaluate:23Jun2016)  Requested for: 24Feb2016; Last   Rx:24Feb2016 Ordered   7   Hydrocodone-Acetaminophen 5-325 MG Oral Tablet; TAKE 1 TABLET 3 times daily PRN   as needed for pain; Therapy: 56NQJ0357 to (Evaluate:28Nov2014); Last Rx:31Oct2014 Ordered   8  LamoTRIgine 200 MG Oral Tablet; TAKE 2 TABLETS AT BEDTIME; Therapy: 18LKN6720 to (Evaluate:10May2016)  Requested for: 17HGA0189; Last   Rx:11Jan2016 Ordered   9  Lansoprazole 15 MG Oral Capsule Delayed Release; take 1 capsule daily; Last   Rx:64Sdp6926 Ordered   10  Levocetirizine Dihydrochloride 5 MG Oral Tablet; take 1 tablet by mouth every day; Therapy: 73WCJ7909 to (Hilda Mcintosh)  Requested for: 81XZK8169; Last    Rx:04Jan2016 Ordered   11  Levothyroxine Sodium 100 MCG Oral Tablet; take 1 tablet by mouth every day; Therapy: 26MSI1774 to (Robert Quinn)  Requested for: 02QIA4524; Last    Rx:29Jan2016 Ordered   12  Lisinopril 10 MG Oral Tablet; TAKE 1 TABLET BY MOUTH EVERY DAY FOR BLOOD    PRESSURE; Therapy: 17JBS7159 to (Evaluate:10Jun2016)  Requested for: 01FFR2264; Last    Rx:47Blj7839 Ordered   13  Lithium Carbonate  MG Oral Tablet Extended Release; TAKE 2 TABLETS AT    BEDTIME; Therapy: 15UQC3049 to (Evaluate:10May2016)  Requested for: 40VFJ0877; Last    Rx:11Jan2016 Ordered   14  LORazepam 0 5 MG Oral Tablet; Take 1 tablet 3 times daily  as needed; Therapy: 63Ezy5587 to (Evaluate:10May2016); Last Rx:11Jan2016 Ordered   15  MetFORMIN HCl  MG Oral Tablet Extended Release 24 Hour; TAKE 1 TABLET BY    MOUTH EVERY DAY WITH SUPPER; Therapy: 76UXW5792 to (Evaluate:04Sep2016)  Requested for: 34EVF8342; Last    Rx:08Mar2016 Ordered   16  MetroNIDAZOLE 500 MG Oral Tablet; TAKE 1 TABLET EVERY 6 HOURS DAILY; Therapy: 97ZOP6748 to Recorded   17  Nitrofurantoin Monohyd Macro 100 MG Oral Capsule; TAKE 1 CAPSULE EVERY 12    HOURS DAILY; Therapy: 18SEF4115 to (Nette Alvarez)  Requested for: 25PYG5925; Last    Rx:22Sep2015 Ordered   18  PriLOSEC OTC 20 MG Oral Tablet Delayed Release; TAKE 1 TABLET TWICE DAILY;     Therapy: 13Apr2016 to (Evaluate:12Jun2016)  Requested for: 13Apr2016; Last    Rx:56Ofv5351 Ordered   19  Proventil  (90 Base) MCG/ACT Inhalation Aerosol Solution; 2 PUFFS Q 4 HRS; Therapy: 07SJA2915 to (Last IK:89XUF6621)  Requested for: 69GDS1235 Ordered    The medication list was reviewed and updated today  Family Psych History    1  Family history of bicuspid aortic valve (V17 49) (Z82 79)   2  Family history of depression (V17 0) (Z81 8)   3  Family history of Hypertension (V17 49)   4  Family history of Status post aortic valve replacement    5  Family history of Hypertension (V17 49)   6  Family history of Hypothyroidism   7  No family history of mental disorder    8  Family history of Hypertension (V17 49)    9  Family history of Cancer   10  Family history of cerebrovascular accident (V17 1) (Z82 3)   11  Family history of Heart Disease (V17 49)    12  Family history of Cancer   13  Family history of malignant neoplasm (V16 9) (Z80 9)    14  Family history of pneumonia (V18 8) (Z83 1)    13  Family history of pneumonia (V18 8) (Z83 1)    12  Family history of Arthritis (716 90) (M19 90)   17  Family history of Breast Cancer (V16 3)   18  Family history of osteoporosis (V17 81) (Z82 62)   19  Family history of Hypertension (V17 49)   20  Family history of Transient Ischemic Attack    The family history was reviewed and updated today  Social History    · Caffeine Use   · Currently sexually active   · Disabled   · Former smoker (B63 86) (J80 312)   · High school graduate   · Lives with parents   · Never smoked   · No alcohol use   · Tea  The social history was reviewed and updated today  The social history was reviewed and is unchanged  Single, lives with mother and stepfather  No children  Some college  On disability  Worked in past in retail  No history of legal problems  No  history        History Of Phys/Sex Abuse Or Perpetration    History Of Phys/Sex Abuse or Perpetration: No history of physical or sexual abuse  End of Encounter Meds    1  MetroNIDAZOLE 500 MG Oral Tablet; TAKE 1 TABLET EVERY 6 HOURS DAILY; Therapy: 19VEK0664 to Recorded    2  Levocetirizine Dihydrochloride 5 MG Oral Tablet; take 1 tablet by mouth every day; Therapy: 08CEP9243 to (Den Dial)  Requested for: 70VEG1702; Last   Rx:04Jan2016 Ordered    3  ARIPiprazole 15 MG Oral Tablet; TAKE 1 TABLET AT BEDTIME  Requested for:   21Apr2016; Last Rx:21Apr2016 Ordered   4  LamoTRIgine 200 MG Oral Tablet; TAKE 2 TABLETS AT BEDTIME; Therapy: 05MFU3832 to (Evaluate:10Xpm5415)  Requested for: 21Apr2016; Last   Rx:21Apr2016 Ordered   5  Latuda 20 MG Oral Tablet; Take 1 tablet in the evening with food; Therapy: 21Apr2016 to (Evaluate:90Oce5478); Last Rx:87Lpu5081 Ordered   6  Lithium Carbonate  MG Oral Tablet Extended Release; TAKE 2 TABLETS AT   BEDTIME; Therapy: 25EDV0222 to (Evaluate:16Nkc0328)  Requested for: 21Apr2016; Last   Rx:21Apr2016 Ordered    7  FLUoxetine HCl - 20 MG Oral Capsule; TAKE 1 CAPSULE DAILY; Therapy: 66VHC5562 to (Evaluate:93Unb6372)  Requested for: 21Apr2016; Last   Rx:21Apr2016 Ordered    8  Nitrofurantoin Monohyd Macro 100 MG Oral Capsule; TAKE 1 CAPSULE EVERY 12   HOURS DAILY; Therapy: 72DHT4886 to (Sneha Cordoba)  Requested for: 07YVK4723; Last   Rx:85Yvv0528 Ordered    9  Lansoprazole 15 MG Oral Capsule Delayed Release (Prevacid); take 1 capsule daily; Last Rx:70Fqi1948 Ordered    10  LORazepam 0 5 MG Oral Tablet; Take 1 tablet 3 times daily  as needed; Therapy: 26Udc9075 to (Evaluate:62Naa8286); Last Rx:51Zuf0137 Ordered   11  PriLOSEC OTC 20 MG Oral Tablet Delayed Release; TAKE 1 TABLET TWICE DAILY; Therapy: 13Apr2016 to (Evaluate:12Jun2016)  Requested for: 13Apr2016; Last    Rx:13Apr2016 Ordered    12  Lisinopril 10 MG Oral Tablet; TAKE 1 TABLET BY MOUTH EVERY DAY FOR BLOOD    PRESSURE;     Therapy: 45LRB3185 to (Evaluate:10Jun2016)  Requested for: 65IOM1909; Last Rx:17Mgd3651 Ordered    13  Levothyroxine Sodium 100 MCG Oral Tablet (Synthroid); take 1 tablet by mouth every day; Therapy: 47WCI6947 to (Brianx Romi)  Requested for: 36FLE1883; Last    Rx:29Jan2016 Ordered    14  Hydrocodone-Acetaminophen 5-325 MG Oral Tablet; TAKE 1 TABLET 3 times daily PRN    as needed for pain; Therapy: 78YJY3990 to (Evaluate:23Inu1627); Last Rx:20Joc0681 Ordered    15  Accu-Chek Martha Device; test 4 times daily; Therapy: 86WPZ7930 to (Last Rx:70Psc0245)  Requested for: 36TCV2066 Ordered   16  Accu-Chek Martha In Vitro Strip; TEST 4 TIMES DAILY; Therapy: 83FKE3562 to (Evaluate:33Esz8912)  Requested for: 17BBB0598; Last    Rx:41Hpl0660 Ordered   17  Accu-Chek Soft Touch Lancets Miscellaneous; test blood sugar four times daily; Therapy: 56DDP4217 to (Evaluate:77Gtw7928)  Requested for: 25SUE7204; Last    Rx:65Qpx2905 Ordered   18  MetFORMIN HCl  MG Oral Tablet Extended Release 24 Hour; TAKE 1 TABLET BY    MOUTH EVERY DAY WITH SUPPER; Therapy: 99EIH8017 to (Evaluate:62Cxt0929)  Requested for: 29HPD0188; Last    Rx:08Mar2016 Ordered    19  Ciprofloxacin HCl - 500 MG Oral Tablet; TAKE 1 TABLET EVERY 12 HOURS DAILY; Therapy: 92Jdn1018 to (Evaluate:89Inq8639)  Requested for: 21Afh3723; Last    Rx:13Apr2016 Ordered    20  Proventil  (90 Base) MCG/ACT Inhalation Aerosol Solution; 2 PUFFS Q 4 HRS; Therapy: 26TFO7365 to (Last Rx:27Jan2016)  Requested for: 02CIM7838 Ordered    Future Appointments    Date/Time Provider Specialty Site   12/09/2016 08:10 AM PAUL Donato   Endocrinology [facility] ENDOCRINOLOGY   04/26/2016 05:00 PM Brent Pineda [address] [facility]   04/28/2016 01:00 PM Brent Pineda [place]   05/05/2016 02:00 PM Brent Pineda [address] [facility]   05/10/2016 05:00 PM Brent Pineda [place]   05/12/2016 01:00 PM Brent Pineda [address] [facility]   05/19/2016 01:00 PM Bárbara Pineda [place]   05/24/2016 05:00 PM Bárbara Pineda [place]   05/26/2016 01:00 PM Bárbara Pineda [address] [facility]   06/02/2016 01:00 PM Bárbara Pineda [place]     Signatures   Electronically signed by : PAUL Mackenzie ; Apr 21 2016  3:27PM EST                       (Author)

## 2018-01-10 NOTE — PSYCH
Progress Note  Psychotherapy Provided [facility]: Group Therapy provided today  Goals addressed in session:   1 D: PT  was seen for Group Therapy session  Pts engaged in a discussion today re: their struggles with family members and how they often use food to cope  A: Elizabeth led today's discussion as she spoke about her own struggles  P: Pt will be encouraged to continue to attend individual therapy weekly and the Adult ED group on a biweekly basis  Pain Scale and Suicide Risk [facility]: Current Pain Assessment: moderate to severe   On a scale of 0 to 10, the patient rates current pain at 7   Behavioral Health Treatment Plan [address] [place] [address] [address]: Diagnosis and Treatment Plan explained to patient, patient relates understanding diagnosis and is agreeable to Treatment Plan  Assessment    1   Binge-eating disorder, severe (083 6) (B91 61)    Signatures   Electronically signed by : John Carter LCSW; Mar 31 2017  8:52AM EST                       (Author)

## 2018-01-10 NOTE — PROGRESS NOTES
Patient has been in the milieu and interacts with select peers  She is appropriate with staff and cooperative with unit routine  Friend visited this evening  Difficulty getting off to sleep - restless legs    WBC's 13 62

## 2018-01-10 NOTE — CASE MANAGEMENT
CM met with patient  Patient was calm and cooperative; denies SI, HI, psychosis at this time  States that she feels safe to return to home environment soon, after continued stabilization  States that she is still feeling drowsy yet is euthymic due to medication changes that the treatment team has approved  Admits to better sleep last night and a brighter affect was noted  Seems to be completing daily living skills, clean and dressed appropriately  Feels that she is not addressing issues with food, which is her first time being able to get through a meal without counting what she thinks are "constant calories"  This CM had patient sign release for [facility], due to patient's request to cancel an appointment for her  Confirmed with OP next group session and individual treatment  CM did leave message for Ethos as well and will continue to monitor

## 2018-01-10 NOTE — PSYCH
Psych Med Mgmt    Appearance: was calm and cooperative, adequate hygiene and grooming and good eye contact  Observed mood: anxious  Observed mood: affect appropriate  Speech: a normal rate  Thought processes: coherent/organized  Hallucinations: no hallucinations present  Thought Content: no delusions  Abnormal Thoughts: The patient has no suicidal thoughts and no homicidal thoughts  Orientation: The patient is oriented to person, place and time  Recent and Remote Memory: short term memory intact and long term memory intact  Attention Span And Concentration: concentration impaired  Insight: Insight intact  Judgment: Her judgment was intact  Muscle Strength And Tone  Muscle strength and tone were normal  Normal gait and station  Language: no difficulty naming common objects  Fund of knowledge: Patient displays adequate knowledge of current events, adequate fund of knowledge regarding past history and adequate fund of knowledge regarding vocabulary  The patient is experiencing no localized pain  Treatment Recommendations: Continue Lithium 900 mg at bedtime  Continue Ativan 0 5 mg tid PRN  Continue Latuda 60 mg in the evening  Continue Lamictal 400 mg at bedtime  Continue Prozac 20 mg daily  Continue therapy with [facility]; also attends eating disorders group  Follows with PCP for glucose and lipid monitoring  Risks, Benefits And Possible Side Effects Of Medications: Risks, benefits, and possible side effects of medications explained to patient and patient verbalizes understanding, Risks of medications explained if female patient  Patient verbalizes understanding and agrees to notify her doctor if she becomes pregnant  She reports normal appetite, decreased energy, recent 1 lbs weight loss and normal number of sleep hours  Patient states she has been feeling less depressed since last visit   Still has some anxiety, but states she has been trying to cope with that better "I listen to music, try to breathe and relax more"  No suicidal or homicidal ideation  No psychotic symptoms  Has more energy, tiredness is less prominent  No side effects from medications reported  No rash  Vitals  Signs [Data Includes: Current Encounter]   Recorded: 86LHC4786 02:52PM   Heart Rate: 69  Systolic: 570  Diastolic: 72  BP CUFF SIZE: Extra-Large  Height: 5 ft 8 in  Weight: 373 lb   BMI Calculated: 56 71  BSA Calculated: 2 66    Assessment    1  Eating disorder, unspecified (307 50) (F50 9)   2  EILEEN (generalized anxiety disorder) (300 02) (F41 1)   3  Obsessive-compulsive disorder (300 3) (F42)   4  Bipolar I disorder, most recent episode depressed, in partial remission (296 55)   (F31 75)    Plan    1  Follow-up visit in 1 month Evaluation and Treatment  Follow-up  Status: Hold For -   Scheduling  Requested for: 13TPP7365    Review of Systems    Constitutional: recent 1 lb weight loss  Cardiovascular: no complaints of slow or fast heart rate, no chest pain, no palpitations  Respiratory: no complaints of shortness of breath, no wheezing, no dyspnea on exertion  Gastrointestinal: no complaints of abdominal pain, no constipation, no nausea, no diarrhea, no vomiting  Genitourinary: no complaints of dysuria, no incontinence, no pelvic pain, no urinary frequency  Musculoskeletal: no complaints of arthralgia, no myalgias, no limb pain, no joint stiffness  Integumentary: no complaints of skin rash, no itching, no dry skin  Neurological: no complaints of headache, no confusion, no numbness, no dizziness  Past Psychiatric History    Past Psychiatric History: Multiple past inpatient psychiatric admissions  One past suicide attempt by overdose in 2009  Substance Abuse Hx    Substance Abuse History: No drug or alcohol use  Active Problems    1  Abnormal weight gain (783 1) (R63 5)   2  Acute diverticulitis (562 11) (K57 92)   3  Acute pain (338 19) (R52)   4  Allergic rhinitis (477 9) (J30 9)   5  Amenorrhea (626 0) (N91 2)   6  Bulging lumbar disc (722 10) (M51 26)   7  Dizziness (780 4) (R42)   8  Dysfunctional uterine bleeding (626 8) (N93 8)   9  Dysuria (788 1) (R30 0)   10  Eating disorder, unspecified (307 50) (F50 9)   11  Encounter for routine gynecological examination (V72 31) (Z01 419)   12  Esophageal reflux (530 81) (K21 9)   13  Foot pain (729 5) (M79 673)   14  EILEEN (generalized anxiety disorder) (300 02) (F41 1)   15  Gambling disorder, moderate (312 31) (F63 0)   16  Hypertension (401 9) (I10)   17  Hypothyroidism (244 9) (E03 9)   18  Lumbago (724 2) (M54 5)   19  Lumbar degenerative disc disease (722 52) (M51 36)   20  Menorrhagia (626 2) (N92 0)   21  Morbid or severe obesity due to excess calories (278 01) (E66 01)   22  Obsessive-compulsive disorder (300 3) (F42)   23  Obstructive sleep apnea (327 23) (G47 33)   24  Otalgia, unspecified laterality (388 70) (H92 09)   25  Prediabetes (790 29) (R73 09)   26  Urinary tract infection (599 0) (N39 0)   27  URTI (acute upper respiratory infection) (465 9) (J06 9)   28  Viral bronchitis (466 0) (J20 8)   29  Vitamin D deficiency (268 9) (E55 9)    Past Medical History    1  History of Abscess of face (682 0) (L02 01)   2  History of Acute nonsuppurative otitis media, unspecified laterality (381 00) (H65 199)   3  History of Backache (724 5) (M54 9)   4  History of Candidiasis, cutaneous (112 3) (B37 2)   5  History of Dog bite (879 8,E906 0) (W54  0XXA)   6  History of Dysfunction of left Eustachian tube (381 81) (H69 82)   7  History of acute sinusitis (V12 69) (Z87 09)   8  Denied: History of head injury   9  History of Pseudotumor cerebri (348 2) (G93 2)   10  Denied: History of Seizures   11  History of Suicide and self-inflicted injury (Y122 9) (Y24 9YDU)    The active problems and past medical history were reviewed and updated today  Allergies    1  No Known Drug Allergies    Current Meds   1  Accu-Chek Martha Device; test 4 times daily; Therapy: 11CWH3328 to (Last Rx:31Mjm8294)  Requested for: 97APT2171 Ordered   2  Accu-Chek Martha In Vitro Strip; TEST 4 TIMES DAILY; Therapy: 46NCH8171 to (Evaluate:02Ddi2819)  Requested for: 68NDJ7082; Last   Rx:10Mun0510 Ordered   3  Accu-Chek Soft Touch Lancets Miscellaneous; test blood sugar four times daily; Therapy: 62RID6627 to (Evaluate:18Zue5860)  Requested for: 26NDM1366; Last   Rx:15Fqn6254 Ordered   4  Amoxicillin-Pot Clavulanate 875-125 MG Oral Tablet; TAKE 1 TABLET EVERY 12 HOURS   DAILY; Therapy: 91QHZ4429 to (Kaitlynn Olmstead)  Requested for: 26Apr2016; Last   Rx:26Apr2016 Ordered   5  AZO Cranberry 250-30 MG Oral Tablet; Take 1 tablet 4 times daily; Therapy: 62VGC3155 to (Kaitlynn Olmstead)  Requested for: 26Apr2016; Last   Rx:26Apr2016 Ordered   6  FLUoxetine HCl - 20 MG Oral Capsule; TAKE 1 CAPSULE DAILY; Therapy: 56IIT0668 to (05 12 73 93 30)  Requested for: 89QBW4552; Last   Rx:08Jun2016 Ordered   7  Hydrocodone-Acetaminophen 5-325 MG Oral Tablet; TAKE 1 TABLET 3 times daily PRN   as needed for pain; Therapy: 18ZMO0783 to (Evaluate:28Nov2014); Last Rx:46Oef0843 Ordered   8  LamoTRIgine 200 MG Oral Tablet; TAKE 2 TABLETS AT BEDTIME; Therapy: 50FYR1606 to (05 12 73 93 30)  Requested for: 03WDT9651; Last   Rx:08Jun2016 Ordered   9  Lansoprazole 15 MG Oral Capsule Delayed Release (Prevacid); take 1 capsule daily; Last Rx:19Cba8603 Ordered   10  Latuda 60 MG Oral Tablet; TAKE 1 TABLET IN THE EVENING WITH DINNER; Therapy: 22Dey1536 to (Ryley Spears)  Requested for: 06XFW1057; Last    Rx:08Jun2016 Ordered   11  Levocetirizine Dihydrochloride 5 MG Oral Tablet; take 1 tablet by mouth every day; Therapy: 32Cvq8730 to (Evaluate:11Aei8200)  Requested for: 41UGX6578; Last    Rx:51Cbk9119 Ordered   12  Levothyroxine Sodium 100 MCG Oral Tablet (Synthroid); take 1 tablet by mouth every day;     Therapy: 21Nov2013 to (Ying Graham)  Requested for: 47VAG7382; Last    Rx:29Jan2016 Ordered   13  Lisinopril 10 MG Oral Tablet; TAKE 1 TABLET BY MOUTH EVERY DAY FOR BLOOD    PRESSURE; Therapy: 54UNF1636 to (Evaluate:21Oct2016)  Requested for: 85HGJ6492; Last    PT:77IMB0885 Ordered   14  Lithium Carbonate  MG Oral Tablet Extended Release; TAKE 2 TABLETS AT    BEDTIME; Therapy: 96VKC9695 to (Enedina Oseguera)  Requested for: 72NZU3514; Last    Rx:08Jun2016 Ordered   15  LORazepam 0 5 MG Oral Tablet; Take 1 tablet 3 times daily  as needed; Therapy: 17Uji7833 to (Evaluate:06Oct2016); Last Rx:08Jun2016 Ordered   16  MetFORMIN HCl  MG Oral Tablet Extended Release 24 Hour; TAKE 1 TABLET BY    MOUTH EVERY DAY WITH SUPPER; Therapy: 70GCO1222 to (Evaluate:04Sep2016)  Requested for: 25EEC3440; Last    Rx:08Mar2016 Ordered   17  MetroNIDAZOLE 500 MG Oral Tablet; TAKE 1 TABLET EVERY 6 HOURS DAILY; Therapy: 60ZAP3898 to Recorded   18  Nitrofurantoin Monohyd Macro 100 MG Oral Capsule; TAKE 1 CAPSULE EVERY 12    HOURS DAILY; Therapy: 45OXM2723 to (Missy Garrido)  Requested for: 55QZY5620; Last    Rx:36Kgp6303 Ordered   19  PriLOSEC OTC 20 MG Oral Tablet Delayed Release; TAKE 1 TABLET TWICE DAILY; Therapy: 34Eie7375 to (Evaluate:12Jun2016)  Requested for: 76Trz1930; Last    Rx:13Apr2016 Ordered   20  Proventil  (90 Base) MCG/ACT Inhalation Aerosol Solution; 2 PUFFS Q 4 HRS; Therapy: 74EVD2741 to (Last QN:53WYE4859)  Requested for: 87FCW9333 Ordered    The medication list was reviewed and updated today  Family Psych History  Mother    1  Family history of bicuspid aortic valve (V17 49) (Z82 79)   2  Family history of depression (V17 0) (Z81 8)   3  Family history of Hypertension (V17 49)   4  Family history of Status post aortic valve replacement  Father    5  Family history of Hypertension (V17 49)   6  Family history of Hypothyroidism   7  No family history of mental disorder  Brother    8  Family history of Hypertension (V17 49)  Maternal Grandmother    9  Family history of Cancer   10  Family history of cerebrovascular accident (V17 1) (Z82 3)   11  Family history of Heart Disease (V17 49)  Paternal Grandmother    15  Family history of Cancer   13  Family history of malignant neoplasm (V16 9) (Z80 9)  Maternal Grandfather    15  Family history of pneumonia (V18 8) (Z83 1)  Paternal Grandfather    13  Family history of pneumonia (V18 8) (Z83 1)  Family History    12  Family history of Arthritis (716 90) (M19 90)   17  Family history of Breast Cancer (V16 3)   18  Family history of osteoporosis (V17 81) (Z82 62)   19  Family history of Hypertension (V17 49)   20  Family history of Transient Ischemic Attack    The family history was reviewed and updated today  Social History    · Caffeine Use   · Currently sexually active   · Disabled   · Former smoker (H27 17) (P32 468)   · High school graduate   · Lives with parents   · Never smoked   · No alcohol use   · Tea  The social history was reviewed and is unchanged  Single, lives with mother and stepfather  No children  Some college  On disability  Worked in past in retail  No history of legal problems  No  history  History Of Phys/Sex Abuse Or Perpetration    History Of Phys/Sex Abuse or Perpetration: No history of physical or sexual abuse  End of Encounter Meds    1  MetroNIDAZOLE 500 MG Oral Tablet; TAKE 1 TABLET EVERY 6 HOURS DAILY; Therapy: 65BOT2806 to Recorded    2  Levocetirizine Dihydrochloride 5 MG Oral Tablet; take 1 tablet by mouth every day; Therapy: 65Fgq4187 to (Evaluate:89Skj1748)  Requested for: 30IUW3144; Last   Rx:66Tsp6473 Ordered    3  LamoTRIgine 200 MG Oral Tablet; TAKE 2 TABLETS AT BEDTIME; Therapy: 28LSB2817 to (Lino Rodríguez)  Requested for: 13QOO5820; Last   Rx:08Jun2016 Ordered   4  Latuda 60 MG Oral Tablet; TAKE 1 TABLET IN THE EVENING WITH DINNER;    Therapy: 38Pkx1548 to (Dex Levels)  Requested for: 92QDO0651; Last   Rx:08Jun2016 Ordered   5  Lithium Carbonate  MG Oral Tablet Extended Release; TAKE 2 TABLETS AT   BEDTIME; Therapy: 11STP8885 to (Mark Adjutant)  Requested for: 13RAL2095; Last   Rx:08Jun2016 Ordered    6  FLUoxetine HCl - 20 MG Oral Capsule; TAKE 1 CAPSULE DAILY; Therapy: 67VRZ5469 to (Mark Adjutant)  Requested for: 77OJE0224; Last   Rx:08Jun2016 Ordered    7  Nitrofurantoin Monohyd Macro 100 MG Oral Capsule; TAKE 1 CAPSULE EVERY 12   HOURS DAILY; Therapy: 39YYQ3279 to ([place] Jungling)  Requested for: 73DSY4133; Last   Rx:43Apr5561 Ordered    8  Lansoprazole 15 MG Oral Capsule Delayed Release (Prevacid); take 1 capsule daily; Last Rx:93Vxh8756 Ordered    9  LORazepam 0 5 MG Oral Tablet; Take 1 tablet 3 times daily  as needed; Therapy: 53Kuj9451 to (Evaluate:06Oct2016); Last Rx:08Jun2016 Ordered   10  PriLOSEC OTC 20 MG Oral Tablet Delayed Release; TAKE 1 TABLET TWICE DAILY; Therapy: 32Ijy8705 to (Evaluate:12Jun2016)  Requested for: 83Nhh7785; Last    Rx:13Apr2016 Ordered    11  Lisinopril 10 MG Oral Tablet; TAKE 1 TABLET BY MOUTH EVERY DAY FOR BLOOD    PRESSURE; Therapy: 22LWQ1957 to (Evaluate:21Oct2016)  Requested for: 15ZMR3495; Last    DE:41QIH4294 Ordered    12  Levothyroxine Sodium 100 MCG Oral Tablet (Synthroid); take 1 tablet by mouth every day; Therapy: 41EKW3100 to (Jaimie Cluster)  Requested for: 35TGV5839; Last    Rx:29Jan2016 Ordered    13  Hydrocodone-Acetaminophen 5-325 MG Oral Tablet; TAKE 1 TABLET 3 times daily PRN    as needed for pain; Therapy: 76QOD7271 to (Evaluate:28Nov2014); Last Rx:31Oct2014 Ordered    14  Accu-Chek Martha Device; test 4 times daily; Therapy: 44NPF3053 to (Last Rx:15Dzi0416)  Requested for: 93TEB0859 Ordered   15  Accu-Chek Martha In Vitro Strip; TEST 4 TIMES DAILY; Therapy: 52VGV6882 to (Evaluate:88Suj4186)  Requested for: 06KLV9132; Last    Rx:50Dya3842 Ordered   16  Accu-Chek Soft Touch Lancets Miscellaneous; test blood sugar four times daily; Therapy: 02ETC0011 to (Evaluate:85Mtm7606)  Requested for: 59RUM5541; Last    Rx:65Ort6807 Ordered   17  MetFORMIN HCl  MG Oral Tablet Extended Release 24 Hour; TAKE 1 TABLET BY    MOUTH EVERY DAY WITH SUPPER; Therapy: 91RBD8043 to (Evaluate:41Nyp7162)  Requested for: 83RJX7220; Last    Rx:03Irf3873 Ordered    18  AZO Cranberry 250-30 MG Oral Tablet; Take 1 tablet 4 times daily; Therapy: 57WMP2494 to (Harriet Brand)  Requested for: 59Cxs1713; Last    Rx:52Jnc8755 Ordered    19  Amoxicillin-Pot Clavulanate 875-125 MG Oral Tablet; TAKE 1 TABLET EVERY 12 HOURS    DAILY; Therapy: 04DTC3159 to (Carnella Halsted)  Requested for: 04Udc0463; Last    Rx:26Apr2016 Ordered   20  Proventil  (90 Base) MCG/ACT Inhalation Aerosol Solution; 2 PUFFS Q 4 HRS; Therapy: 57BNJ9919 to (Last Rx:27Jan2016)  Requested for: 58GTI5217 Ordered    Future Appointments    Date/Time Provider Specialty Site   12/09/2016 08:10 AM PAUL Donato  Endocrinology [facility] ENDOCRINOLOGY   07/29/2016 03:15 PM PAUL Samson  Psychiatry [facility]   08/29/2016 02:00 PM PAUL Samson   Psychiatry [facility]YCHIATRIC [facility]   07/05/2016 05:00 PM [address] [facility] THERAPISTS   07/07/2016 01:00 PM [address] [facility] THERAPISTS   07/15/2016 02:00 PM [address] [facility] THERAPISTS   07/19/2016 05:00 PM Preet Calderon [facility] THERAPISTS   07/22/2016 11:00 AM [address] [facility] THERAPISTS   08/02/2016 05:00 PM [address] [facility] THERAPISTS   08/05/2016 02:00 PM B[address]bara Pineda [place]   08/11/2016 02:00 PM B[address]bara Pineda [place]   08/16/2016 05:00 PM B[address]bara Pineda [place]   08/18/2016 02:00 PM B[address]bara Pineda [place]   08/25/2016 02:00 PM B[address]bara Pineda, [address] [facility] THERAPISTS   08/30/2016 05:00 PM [address]bara [address], [address] [facility] THERAPISTS   09/13/2016 05:00 PM [address]bara [address], [address] [facility] THERAPISTS   09/27/2016 05:00 PM [address]bara [address], [address] [facility] THERAPISTS   10/11/2016 05:00 PM B[address]bara Pineda [place]   10/25/2016 05:00 PM B[address]bara Honeycutt [place]     Signatures   Electronically signed by : PAUL Rod ; Jun 29 2016  2:59PM EST                       (Author)

## 2018-01-10 NOTE — PSYCH
Progress Note  Psychotherapy Provided [facility]: Group Therapy provided today  Goals addressed in session:   D: Pt  was seen for Group Therapy session  Pts engaged in a discussion re: Difficulty in Emotion Regulation Scale (DERS)  A: Elizabeth shared about a presentation by a dietician that she attended  She state that she is going to see a different dietician that works with the one that gave the presentation because of scheduling  She was receptive to feedback from peers and completed the [facility]  P: Frantz will continue to attend individual therapy once a week and the Adult ED group on a biweekly basis  Pain Scale and Suicide Risk [facility]: Current Pain Assessment: no pain   On a scale of 0 to 10, the patient rates current pain at 0   Current suicide risk is low   Behavioral Health Treatment Plan [address] [place] [address] [address]: Diagnosis and Treatment Plan explained to patient, patient relates understanding diagnosis and is agreeable to Treatment Plan  Assessment    1   Eating disorder, unspecified (307 50) (F50 9)    Signatures   Electronically signed by : Fiorella Cardoza LCSW; Apr 15 2016  9:08AM EST                       (Author)    Electronically signed by : Fiorella Cardoza LCSW; Apr 15 2016  9:08AM EST                       (Author)

## 2018-01-10 NOTE — PSYCH
Risk Assessment    The following ratings are based on my observation of this patient over the last session  Risk of Harm to Self:   Demographic risk factors include , alaskan, or native [place]  Historical Risk Factors include: chronic psychiatric problems and history of gambling  Recent Specific Risk Factors include: experienced persistent ideation, sense of hopelessness/helplessness, unable to visualize a realistic positive future, worries about finances or work, chronic pain or health problems and diagnosis of depression  These risk factors presented within the last year  Risk of Harm to Others:   Based on the above information, the client presents the following risk of harm to self or others: moderate  The following interventions are recommended: consultation with psychiatrist to explore ect, tms, ketamine  Active Problems    1  Abnormal weight gain (783 1) (R63 5)   2  Allergic rhinitis (477 9) (J30 9)   3  Amenorrhea (626 0) (N91 2)   4  Binge-eating disorder, severe (783 6) (F50 81)   5  Bulging lumbar disc (722 10) (M51 26)   6  Dysuria (788 1) (R30 0)   7  Esophageal reflux (530 81) (K21 9)   8  Foot pain (729 5) (M79 673)   9  EILEEN (generalized anxiety disorder) (300 02) (F41 1)   10  Gambling disorder, moderate (312 31) (F63 0)   11  Hypertension (401 9) (I10)   12  Hypothyroidism (244 9) (E03 9)   13  Long term current use of therapeutic drug (V58 69) (Z79 899)   14  Lumbago (724 2) (M54 5)   15  Lumbar degenerative disc disease (722 52) (M51 36)   16  Morbid or severe obesity due to excess calories (278 01) (E66 01)   17  Nocturnal enuresis (788 36) (N39 44)   18  Obsessive-compulsive disorder (300 3) (F42 9)   19  Other abnormal glucose (790 29) (R73 09)   20  Overactive bladder (596 51) (N32 81)   21  RLS (restless legs syndrome) (333 94) (G25 81)   22  Severe bipolar I disorder, most recent episode depressed (296 53) (F31 4)   23   Urgency incontinence (788 31) (N39 41) 24  Urinary incontinence, unspecified type (788 30) (R32)   25  Urinary tract infection, site unspecified (599 0) (N39 0)   26  Vitamin D deficiency (268 9) (E55 9)    Past Medical History    1  History of Abscess of face (682 0) (L02 01)   2  History of Acute diverticulitis (562 11) (K57 92)   3  History of Acute frontal sinusitis (461 1) (J01 10)   4  History of Acute nonsuppurative otitis media, unspecified laterality (381 00) (H65 199)   5  History of Acute pain (338 19) (R52)   6  History of Anorexia nervosa in remission (307 1) (F50 00)   7  History of Backache (724 5) (M54 9)   8  History of Candidiasis, cutaneous (112 3) (B37 2)   9  History of Dog bite (879 8,E906 0) (W54  0XXA)   10  History of Dysfunction of left eustachian tube (381 81) (H69 82)   11  History of Dysuria (788 1) (R30 0)   12  History of acute bronchitis (V12 69) (Z87 09)   13  History of acute sinusitis (V12 69) (Z87 09)   14  History of bronchitis (V12 69) (Z87 09)   15  History of cough   16  Denied: History of head injury   17  History of Pseudotumor cerebri (348 2) (G93 2)   18  Denied: History of Seizures   19   History of Suicide and self-inflicted injury (Z593 5) (X83 8XXA)    Future Appointments    Date/Time Provider Specialty Site   09/19/2017 03:00 PM Nurse, Urology Diagnostic  [facility] [place]   09/22/2017 10:00 AM [address] [facility] [facility] THERAPISTS   09/26/2017 05:00 PM [address] [facility] [facility] THERAPISTS   09/28/2017 08:00 AM [address] [facility] THERAPISTS   10/03/2017 05:00 PM B[address]bara Pineda [place]   10/05/2017 11:00 AM B[address]bara Pineda [place]   10/10/2017 05:00 PM B[address]bara Pineda [place]   10/12/2017 10:00 AM [address] [facility] THERAPISTS   10/17/2017 03:00 PM Nurse, Urology Diagnostic  [facility]   10/17/2017 05:00 PM Yanely Pineda, [address] ASSOC THERAPISTS   10/19/2017 01:00 PM Yanely Pineda [place], [address] [facility] ASSOC THERAPISTS   10/24/2017 05:00 PM Yanely Pineda [place], [address] [facility] ASSOC THERAPISTS   10/26/2017 11:00 AM Yanely Pineda, [address] ASSOC THERAPISTS   10/30/2017 08:30 AM Hansel Kate, [address] [place] [address]   10/31/2017 05:00 PM [facility]   11/02/2017 01:00 PM Yanely Pineda, [address] [facility] THERAPISTS   11/06/2017 03:30 PM Russell Hill [facility] Psychiatry [place] [address]   11/07/2017 05:00 PM [place], [facility]   11/14/2017 03:00 PM Nurse, Urology Diagnostic  [facility] [place]   11/14/2017 05:00 PM [place], [facility]   11/21/2017 05:00 PM [place], [facility]   11/28/2017 05:00 PM Yanely Pineda, [facility]   12/05/2017 05:00 PM Yanely Pineda, [facility]   12/12/2017 03:00 PM Nurse, Urology Diagnostic  [facility] [address]   12/12/2017 05:00 PM [place]   12/19/2017 05:00 PM Yanely Pineda [place], [facility]     Signatures   Electronically signed by : Crystal Damico LCSW; Sep 14 2017 10:07AM EST                       (Author)

## 2018-01-10 NOTE — PSYCH
Progress Note  Psychotherapy Provided [facility]: Individual Psychotherapy 50 minutes provided today  Goals addressed in session:   1-3 D: Met with Elizabeth for Individual Therapy session  Elizabeth spoke more today about her feelings re: her desire to decrease her OCD and focus on needing to complete her list  She was provided with reality feedback on the lack of relief that she gets for completing her list as she is already focused on going to sleep and starting over the next day  A: Shon Prado [place] 78 has continued to evidence more willingness to work on issues and state less hopelessness  She is in the process of having her dog approved as an AINSLEY at this time  P: Upcoming sessions will be used to continue to address the above  She will continue to attend both weekly Trauma and ED group therapy sessions  Pain Scale and Suicide Risk [facility]: Current Pain Assessment: moderate to severe   On a scale of 0 to 10, the patient rates current pain at 5   Current suicide risk is moderate (see risk assessment checklist)   [facility]: Diagnosis and Treatment Plan explained to patient, patient relates understanding diagnosis and is agreeable to Treatment Plan  Assessment    1  Binge-eating disorder, severe (783 6) (F50 81)   2  EILEEN (generalized anxiety disorder) (300 02) (F41 1)   3   Obsessive-compulsive disorder (300 3) (F42 9)    Signatures   Electronically signed by : Scarlet Vivas LCSW; Aug 10 2017 12:44PM EST                       (Author)

## 2018-01-10 NOTE — PSYCH
Progress Note  Psychotherapy Provided [facility]: Group Therapy provided today  Goals addressed in session:   1 D: Pt attended Trauma Group  Pts engaged in an activity / discussion in which they were encouraged to use other group members to arrange a family sculpture of a memory of an interaction with members of their family  A: Elizabeth expressed that she was not comfortable engaging in the above  She acknowledged being anxious during today's group and was able to express some of her feelings about her family instead of engaging in the above  P: Pt will continue to be seen for individual and group therapy during which the above will be further processed  Pain Scale and Suicide Risk [facility]: On a scale of 0 to 10, the patient rates current pain at 4   Behavioral Health Treatment Plan [facility]: Diagnosis and Treatment Plan explained to patient, patient relates understanding diagnosis and is agreeable to Treatment Plan  Assessment    1   EILEEN (generalized anxiety disorder) (300 02) (F41 1)    Signatures   Electronically signed by : Calli Garcia LCSW; May 18 2017  2:21PM EST                       (Author)

## 2018-01-10 NOTE — PLAN OF CARE
Problem: SELF HARM/SUICIDALITY  Goal: Will have no self-injury during hospital stay  INTERVENTIONS:  - Q 7 MINUTES: Routine safety checks  - Q WAKING SHIFT & PRN: Assess risk to determine if routine checks are adequate to maintain patient safety  - Encourage patient to participate actively in care by formulating a plan to combat response to suicidal ideation, identify supports and resources   Outcome: Progressing      Problem: DEPRESSION  Goal: Will be euthymic at discharge  INTERVENTIONS:  - Administer medication as ordered  - Provide emotional support via 1:1 interaction with staff  - Encourage involvement in milieu/groups/activities  - Monitor for social isolation   Outcome: Progressing      Problem: SLEEP DISTURBANCE  Goal: Will exhibit normal sleeping pattern  Interventions:  -  Assess the patients sleep pattern, noting recent changes  - Administer medication as ordered  - Decrease environmental stimuli, including noise, as appropriate during the night  - Encourage the patient to actively participate in unit groups and or exercise during the day to enhance ability to achieve adequate sleep at night  - Assess the patient, in the morning, encouraging a description of sleep experience   Outcome: Progressing      Problem: DISCHARGE PLANNING  Goal: Discharge to home or other facility with appropriate resources  INTERVENTIONS:  - Identify barriers to discharge w/patient and caregiver  - Arrange for needed discharge resources and transportation as appropriate  - Identify discharge learning needs (meds, wound care, etc )  - Arrange for interpretive services to assist at discharge as needed  - Refer to Case Management Department for coordinating discharge planning if the patient needs post-hospital services based on physician/advanced practitioner order or complex needs related to functional status, cognitive ability, or social support system   Outcome: Progressing      Problem: Ineffective Coping  Goal: Participates in unit activities  Interventions:  - Provide therapeutic environment   - Provide required programming   - Redirect inappropriate behaviors    Outcome: Progressing

## 2018-01-10 NOTE — PSYCH
Progress Note  Psychotherapy Provided [facility]: Individual Psychotherapy 50 minutes and Telephone Contact provided today  Goals addressed in session:   1: Pt was seen today for Individual Therapy session  Pt spoke about her feelings re: having seen her ex- paramour last week and subsequently dreamt about her kids  She also shared that she is not sleeping, conitnuing to binge  A: Elizabeth was not depressed during today's session and able to engage but was less positive re: being able to change her thoughts, behaviors  Vladimir Carrion P:Elizabeth will continue to attend Individual and Group therapy on a weekly basis  Pain Scale and Suicide Risk [facility]: Current Pain Assessment: moderate to severe   On a scale of 0 to 10, the patient rates current pain at 6   Behavioral Health Treatment Plan [facility]: Diagnosis and Treatment Plan explained to patient, patient relates understanding diagnosis and is agreeable to Treatment Plan  Assessment    1  Bipolar I disorder, most recent episode depressed, moderate (296 52) (F31 32)   2  Eating disorder, unspecified (307 50) (F50 9)   3  Gambling disorder, moderate (312 31) (F63 0)   4   Obsessive-compulsive disorder (300 3) (F42)    Signatures   Electronically signed by : Mindy Perez LCSW; Mar 10 2016  2:36PM EST                       (Author)

## 2018-01-11 VITALS
SYSTOLIC BLOOD PRESSURE: 130 MMHG | WEIGHT: 293 LBS | RESPIRATION RATE: 16 BRPM | HEIGHT: 68 IN | HEART RATE: 74 BPM | TEMPERATURE: 97.9 F | DIASTOLIC BLOOD PRESSURE: 63 MMHG | BODY MASS INDEX: 44.41 KG/M2 | OXYGEN SATURATION: 97 %

## 2018-01-11 LAB
1,25(OH)2D3 SERPL-MCNC: 48.6 PG/ML (ref 19.9–79.3)
LITHIUM SERPL-SCNC: 0.6 MMOL/L (ref 0.5–1)

## 2018-01-11 PROCEDURE — 80178 ASSAY OF LITHIUM: CPT | Performed by: PSYCHIATRY & NEUROLOGY

## 2018-01-11 RX ORDER — ROPINIROLE 3 MG/1
3 TABLET, FILM COATED ORAL EVERY 12 HOURS
Qty: 60 TABLET | Refills: 0 | Status: SHIPPED | OUTPATIENT
Start: 2018-01-11 | End: 2018-05-03 | Stop reason: SDUPTHER

## 2018-01-11 RX ORDER — LITHIUM CARBONATE 300 MG/1
900 CAPSULE ORAL
Qty: 90 CAPSULE | Refills: 0 | Status: SHIPPED | OUTPATIENT
Start: 2018-01-11 | End: 2018-01-24 | Stop reason: CLARIF

## 2018-01-11 RX ORDER — GABAPENTIN 300 MG/1
900 CAPSULE ORAL
Qty: 90 CAPSULE | Refills: 0 | Status: SHIPPED | OUTPATIENT
Start: 2018-01-11 | End: 2018-01-24 | Stop reason: CLARIF

## 2018-01-11 RX ORDER — CYCLOSPORINE 0.5 MG/ML
1 EMULSION OPHTHALMIC EVERY 12 HOURS SCHEDULED
Qty: 0.4 ML | Refills: 0 | Status: CANCELLED | OUTPATIENT
Start: 2018-01-11

## 2018-01-11 RX ORDER — SERTRALINE HYDROCHLORIDE 100 MG/1
200 TABLET, FILM COATED ORAL DAILY
Qty: 60 TABLET | Refills: 0 | Status: SHIPPED | OUTPATIENT
Start: 2018-01-11 | End: 2018-08-18 | Stop reason: SDUPTHER

## 2018-01-11 RX ORDER — BACITRACIN, NEOMYCIN, POLYMYXIN B 400; 3.5; 5 [USP'U]/G; MG/G; [USP'U]/G
1 OINTMENT TOPICAL 2 TIMES DAILY PRN
Status: DISCONTINUED | OUTPATIENT
Start: 2018-01-11 | End: 2018-01-11 | Stop reason: HOSPADM

## 2018-01-11 RX ORDER — LEVOTHYROXINE SODIUM 0.1 MG/1
100 TABLET ORAL
Qty: 30 TABLET | Refills: 0 | Status: SHIPPED | OUTPATIENT
Start: 2018-01-12 | End: 2018-05-18 | Stop reason: SDUPTHER

## 2018-01-11 RX ORDER — OMEPRAZOLE 20 MG/1
20 CAPSULE, DELAYED RELEASE ORAL
Qty: 30 CAPSULE | Refills: 0 | Status: SHIPPED | OUTPATIENT
Start: 2018-01-12 | End: 2018-04-16 | Stop reason: SDUPTHER

## 2018-01-11 RX ORDER — LAMOTRIGINE 150 MG/1
300 TABLET ORAL
Qty: 60 TABLET | Refills: 0 | Status: SHIPPED | OUTPATIENT
Start: 2018-01-11 | End: 2018-11-06

## 2018-01-11 RX ORDER — LISINOPRIL 10 MG/1
10 TABLET ORAL DAILY
Qty: 30 TABLET | Refills: 0 | Status: SHIPPED | OUTPATIENT
Start: 2018-01-11 | End: 2018-07-05 | Stop reason: SDUPTHER

## 2018-01-11 RX ADMIN — OMEPRAZOLE 20 MG: 20 CAPSULE, DELAYED RELEASE ORAL at 05:42

## 2018-01-11 RX ADMIN — SERTRALINE HYDROCHLORIDE 200 MG: 100 TABLET ORAL at 08:17

## 2018-01-11 RX ADMIN — LISINOPRIL 10 MG: 10 TABLET ORAL at 08:17

## 2018-01-11 RX ADMIN — CYCLOSPORINE 1 DROP: 0.5 EMULSION OPHTHALMIC at 12:02

## 2018-01-11 RX ADMIN — LEVOTHYROXINE SODIUM 100 MCG: 100 TABLET ORAL at 05:42

## 2018-01-11 RX ADMIN — ROPINIROLE 3 MG: 1 TABLET, FILM COATED ORAL at 08:18
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
## 2018-01-11 NOTE — NURSING NOTE
Patient discharged to care of friend with both verbal and written discharge instructions provided  All belongings returned and prescriptions also provided before discharge

## 2018-01-11 NOTE — MISCELLANEOUS
Message   Recorded as Task   Date: 05/12/2017 07:16 PM, Created By: Fariba Kern   Task Name: Care Coordination   Assigned To: Fariba Kern   Regarding Patient: Tammy Acosta, Status: Active   Comment:    Jasmyn Braun - 12 May 2017 7:16 PM     TASK CREATED  Yamila,   An FYI  I just wanted to relay a situation with this patient, for the purpose of fostering close collaboration among team members in regards to treatment issues and to encourage Pts to seek  from their providers before starting anything new  Natacha Logan essentially wanted to self medicate for sleep and was apparently either seeking advice or validation from Bárbara sotomayor about taking Benadryl for this purpose  (I did not witness the conversation between them but Bárbara sotomayor informed me of what patient began doing to her knowledge, and was concerned, and below is my response to therapist )    Bárbara sotomayor, I spoke with Natacha Logan this evening and she confirmed she spoke with you about taking Benadryl to help with getting back to sleep after being awoken by the need to urinate  She confirmed she got a medicine by her urologist which she cannot start until next Tuesday  She stated you had suggested 2 tabs of Benadryl and also informed me that she was taking (2-3) 50mg tabs of Benadryl  As a general FYI to ensure safety of the patients, please tell them to consult their provider before taking a new medication  As some things may seem relatively harmless, and Benadryl has been known to be used in this capacity in some circumstances, it can significantly prolong the QT, (worse at higher doses)  Also she is on a high end dose of Lithium ER which can also do the same  I also have her on Sertraline, albeit a low dose at this time, and this also can do the same  I prescribed her Hydroxyzine instead, and while this medication can also potentially prolong the QT, it does not do so to nearly the same extent as the Benadryl        If you have any questions, let me know,  Mónica Recinos - 15 May 2017 8:37 AM     TASK REPLIED TO: Previously Assigned To Leroy Pichardo                      Thank you = I will follow up wtih Erickson Driscoll   Reviewed/Acknowledged      Signatures   Electronically signed by : SCOTTIE English; May 22 2017  7:22PM EST                       (Author)

## 2018-01-11 NOTE — PSYCH
Progress Note  Psychotherapy Provided [facility]: Individual Psychotherapy 50 minutes provided today  Goals addressed in session:   1-3 D: Met with Elizabeth for Individual Therapy session  Elizabeth spoke today about her feelings re: her belief that no one will ever want to be invovled with her  She spoke further about the fear of both her mother and stepfather yelling if she does anything wrong and her overwhelming OCD thoughts particularly about wanting to die  A: Elizabeth was much more depressed in presentation today  She admits to crying for the past 5 days--out of character for her, but denies suicidal intent as she would never leave her dog who relies heavily on her  P: Upcoming sessions will be used to continue to address the above  She will continue to attend both weekly Trauma and ED group therapy sessions  Pain Scale and Suicide Risk [facility]: Current Pain Assessment: moderate to severe   On a scale of 0 to 10, the patient rates current pain at 5   Current suicide risk is moderate (see risk assessment checklist)   Behavioral Health Treatment Plan [facility]: Diagnosis and Treatment Plan explained to patient, patient relates understanding diagnosis and is agreeable to Treatment Plan  Assessment    1  Binge-eating disorder, severe (783 6) (F50 81)   2  EILEEN (generalized anxiety disorder) (300 02) (F41 1)   3  Severe bipolar I disorder, most recent episode depressed (296 53) (F31 4)   4   Obsessive-compulsive disorder (300 3) (F42 9)    Signatures   Electronically signed by : Albert Figueredo LCSW; Sep 14 2017 10:25AM EST                       (Author)

## 2018-01-11 NOTE — PLAN OF CARE
Problem: SELF HARM/SUICIDALITY  Goal: Will have no self-injury during hospital stay  INTERVENTIONS:  - Q 7 MINUTES: Routine safety checks  - Q WAKING SHIFT & PRN: Assess risk to determine if routine checks are adequate to maintain patient safety  - Encourage patient to participate actively in care by formulating a plan to combat response to suicidal ideation, identify supports and resources   Outcome: Completed Date Met: 01/11/18      Problem: DEPRESSION  Goal: Will be euthymic at discharge  INTERVENTIONS:  - Administer medication as ordered  - Provide emotional support via 1:1 interaction with staff  - Encourage involvement in milieu/groups/activities  - Monitor for social isolation   Outcome: Completed Date Met: 01/11/18      Problem: SLEEP DISTURBANCE  Goal: Will exhibit normal sleeping pattern  Interventions:  -  Assess the patients sleep pattern, noting recent changes  - Administer medication as ordered  - Decrease environmental stimuli, including noise, as appropriate during the night  - Encourage the patient to actively participate in unit groups and or exercise during the day to enhance ability to achieve adequate sleep at night  - Assess the patient, in the morning, encouraging a description of sleep experience   Outcome: Completed Date Met: 01/11/18      Problem: Ineffective Coping  Goal: Participates in unit activities  Interventions:  - Provide therapeutic environment   - Provide required programming   - Redirect inappropriate behaviors    Outcome: Completed Date Met: 01/11/18

## 2018-01-11 NOTE — PSYCH
Progress Note  Psychotherapy Provided [facility]: Individual Psychotherapy 50 minutes provided today  Goals addressed in session:   1-3 D: Met with Elizabeth for Individual Therapy session  Elizabeth spoke more today about her feelings re: her frustration with her medication and how badly she wants her life to improve  She agreed to terminate weighing herself multiple times per day and agreed to consider entering the PHP here  A: Kacie Raya has continued to express herself better to this worker but does appear to get more frustrated towards the end of sessions  P: Upcoming sessions will be used to continue to address the above  She will continue to attend both weekly Trauma and ED group therapy sessions  Pain Scale and Suicide Risk [facility]: Current Pain Assessment: moderate to severe   On a scale of 0 to 10, the patient rates current pain at 5   Current suicide risk is moderate (see risk assessment checklist)   Behavioral Health Treatment Plan [facility]: Diagnosis and Treatment Plan explained to patient, patient relates understanding diagnosis and is agreeable to Treatment Plan  Assessment    1  Binge-eating disorder, severe (783 6) (F50 81)   2  Bipolar 1 disorder, depressed, moderate (296 52) (F31 32)   3  EILEEN (generalized anxiety disorder) (300 02) (F41 1)   4   Obsessive-compulsive disorder (300 3) (F42 9)    Signatures   Electronically signed by : Alessandro Richard LCSW; Jul 27 2017  1:42PM EST                       (Author)

## 2018-01-11 NOTE — PSYCH
Progress Note  Psychotherapy Provided [facility]:   Goals addressed in session:   1 D: PT  was seen for Group Therapy session  PT engaged in dialogue re: the correlation between their ED and the struggle to delay gratification / manage impulses  A: Elizabeth spoke about how much her ED parallels her OCD and manic struggles  She demonstrated insight and openness to sharing with group members today  P: Pt will continue to attend individual therapy once a week and the Adult ED group on a biweekly basis  Pain Scale and Suicide Risk [facility]: Current Pain Assessment: no pain   Behavioral Health Treatment Plan [facility]: Diagnosis and Treatment Plan explained to patient, patient relates understanding diagnosis and is agreeable to Treatment Plan  Assessment    1   Eating disorder, unspecified (307 34) (O96 3)    Signatures   Electronically signed by : Alessandro Richard LCSW; Jun 8 2016 10:07AM EST                       (Author)

## 2018-01-11 NOTE — PSYCH
Progress Note  Psychotherapy Provided [facility]: Group Therapy provided today  Goals addressed in session:   1 D: PT  was seen for Group Therapy session  Pts engaged in a therapeutic activity / discussion re: living with intention today  A:Despite struggling, Juan Peng was able to remain throughout the group and complete the activity  P: Pt will be encouraged to continue to attend individual therapy weekly and the Adult ED group on a biweekly basis  Pain Scale and Suicide Risk [facility]: On a scale of 0 to 10, the patient rates current pain at 5   Behavioral Health Treatment Plan [facility]: Diagnosis and Treatment Plan explained to patient, patient relates understanding diagnosis and is agreeable to Treatment Plan  Assessment    1   Binge-eating disorder, severe (173 6) (T89 78)    Signatures   Electronically signed by : Nael Elena LCSW; May 10 2017  9:55AM EST                       (Author)

## 2018-01-11 NOTE — PSYCH
Progress Note  Psychotherapy Provided [facility]: Individual Psychotherapy 50 minutes and Telephone Contact provided today  Goals addressed in session:   1: Pt was seen today for Individual Therapy session  Pt spoke about her feelings re: an increase in her OCD thoughts re: how things operate, were built  She shared how overwhelmed she has also been with conflicts at home with both her mother and stepfather as they told her to "go live in a group home "   A: Frantz continues to underestimate her capability while perceiving her worth, value to be associated with her weight  She values her realtionship with this worker and a peer but still struggles with her thoughts, anxiety  P:Elizabeth will continue to attend Individual and Group therapy on a weekly basis  Pain Scale and Suicide Risk [facility]: On a scale of 0 to 10, the patient rates current pain at 3   Current suicide risk is low   Behavioral Health Treatment Plan [facility]: Diagnosis and Treatment Plan explained to patient, patient relates understanding diagnosis and is agreeable to Treatment Plan  Assessment    1  Bipolar I disorder, most recent episode depressed, moderate (296 52) (F31 32)   2  Eating disorder, unspecified (307 50) (F50 9)   3  EILEEN (generalized anxiety disorder) (300 02) (F41 1)   4   Obsessive-compulsive disorder (300 3) (F42)    Signatures   Electronically signed by : Jhonathan Lipscomb LCSW; Jun 14 2016  4:53PM EST                       (Author)

## 2018-01-11 NOTE — PROGRESS NOTES
Pt denies all symptoms and is looking forward to d/c  Pt states that she is looking forward to seeing her dog  Pt is out and attending groups
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
## 2018-01-11 NOTE — PSYCH
Psych Med Mgmt    Appearance: was calm and cooperative, adequate hygiene and grooming and good eye contact  Observed mood: depressed and anxious  Observed mood: affect was constricted  Speech: a normal rate  Thought processes: coherent/organized  Hallucinations: no hallucinations present  Thought Content: no delusions  Abnormal Thoughts: The patient has obsessive thought content, but no suicidal thoughts and no homicidal thoughts  Orientation: The patient is oriented to person, place and time  Recent and Remote Memory: short term memory intact and long term memory intact  Attention Span And Concentration: concentration impaired  Insight: Insight intact  Judgment: Her judgment was intact  Muscle Strength And Tone  Muscle strength and tone were normal  Normal gait and station  Language: no difficulty naming common objects, no difficulty repeating a phrase and no difficulty writing a sentence  Fund of knowledge: Patient displays adequate knowledge of current events, adequate fund of knowledge regarding past history and adequate fund of knowledge regarding vocabulary  The patient is experiencing no localized pain  On a scale of 0 - 10 the pain severity is a 0  Treatment Recommendations: Continue Lithium 900 mg at bedtime; will check Lithium level and BMP before the next visit  Continue Ativan 0 5 mg tid PRN  Continue Latuda 120 mg in the evening  Continue Lamictal 400 mg at bedtime  Continue Prozac 20 mg daily  Does not want medication changes  Continue therapy with [facility]; also attends eating disorders group and trauma group  Follows with PCP for glucose and lipid monitoring  Risks, Benefits And Possible Side Effects Of Medications: Risks, benefits, and possible side effects of medications explained to patient and patient verbalizes understanding, Risks of medications explained if female patient   Patient verbalizes understanding and agrees to notify her doctor if she becomes pregnant  Discussed with patient the risks of sedation, respiratory depression, impairment of ability to drive and potential for abuse and addiction related to treatment with benzodiazepine medications  The patient understands risk of treatment with benzodiazepine medications, agrees to not drive if feels impaired and agrees to take medications as prescribed  The patient has been filling controlled prescriptions on time as prescribed to [facility] program       She reports increased appetite, normal energy level, recent 2 lbs weight gain  and decrease in number of sleep hours 6  Pasqual Ales states she had a lot of obsessive thoughts until 2 weeks ago - states she could not leave the house because of urge "to fold everything  I was getting stuck"  Now feels better, obsessive thoughts are more controlled  Some depression on and off, otherwise feels mood swings are controlled  No suicidal or homicidal ideation  No psychotic symptoms  Sleep fluctuates  Appetite is increased  No side effects from medications reported  No rash  Vitals  Signs   Recorded: 99AXB6251 03:56PM   Heart Rate: 67  Systolic: 698  Diastolic: 84  BP Cuff Size: Extra-Large  Height: 5 ft 8 in  Weight: 375 lb   BMI Calculated: 57 02  BSA Calculated: 2 67    Assessment    1  Binge-eating disorder, severe (783 6) (F50 81)   2  EILEEN (generalized anxiety disorder) (300 02) (F41 1)   3  Obsessive-compulsive disorder (300 3) (F42 9)   4  Bipolar I disorder, most recent episode depressed, mild (296 51) (F31 31)    Plan    1  Follow-up Visit in 4 Weeks Evaluation and Treatment  Follow-up  Status: Hold For -   Scheduling  Requested for: 94ADO9220    2  LamoTRIgine 200 MG Oral Tablet; TAKE 2 TABLETS AT BEDTIME   3  Latuda 120 MG Oral Tablet; TAKE 1 TABLET IN THE EVENING WITH FOOD   4  Lithium Carbonate  MG Oral Tablet Extended Release; take 2 tablets at   bedtime    5   FLUoxetine HCl - 20 MG Oral Capsule; TAKE 1 CAPSULE DAILY    6  (1) BASIC METABOLIC PROFILE; Status:Active; Requested AOO:93GBM9178;    7  (1) LITHIUM; Status:Active; Requested KXX:43IWE0825;     8  LORazepam 0 5 MG Oral Tablet; Take 1 tablet 3 times daily  as needed; Do Not   Fill Before: 57TRG0970    Review of Systems    Constitutional: recent 2 lb weight gain and feeling tired  Cardiovascular: no complaints of slow or fast heart rate, no chest pain, no palpitations  Respiratory: no complaints of shortness of breath, no wheezing, no dyspnea on exertion  Gastrointestinal: no complaints of abdominal pain, no constipation, no nausea, no diarrhea, no vomiting  Genitourinary: no complaints of dysuria, no incontinence, no pelvic pain, no urinary frequency  Musculoskeletal: no complaints of arthralgia, no myalgias, no limb pain, no joint stiffness  Integumentary: no complaints of skin rash, no itching, no dry skin  Neurological: no complaints of headache, no confusion, no numbness, no dizziness  Past Psychiatric History    Past Psychiatric History: Multiple past inpatient psychiatric admissions  One past suicide attempt by overdose in 2009  Substance Abuse Hx    Substance Abuse History: No drug or alcohol use  Active Problems    1  Abnormal weight gain (783 1) (R63 5)   2  Acute diverticulitis (562 11) (K57 92)   3  Acute pain (338 19) (R52)   4  Allergic rhinitis (477 9) (J30 9)   5  Amenorrhea (626 0) (N91 2)   6  Binge-eating disorder, severe (783 6) (F50 81)   7  Bronchitis (490) (J40)   8  Bulging lumbar disc (722 10) (M51 26)   9  Cough (786 2) (R05)   10  Dizziness (780 4) (R42)   11  Dysfunctional uterine bleeding (626 8) (N93 8)   12  Dysuria (788 1) (R30 0)   13  Encounter for routine gynecological examination (V72 31) (Z01 419)   14  Esophageal reflux (530 81) (K21 9)   15  Foot pain (729 5) (M79 673)   16  EILEEN (generalized anxiety disorder) (300 02) (F41 1)   17   Gambling disorder, moderate (312 31) (F63 0)   18  Hypertension (401 9) (I10)   19  Hypothyroidism (244 9) (E03 9)   20  Long term current use of therapeutic drug (V58 69) (Z79 899)   21  Lumbago (724 2) (M54 5)   22  Lumbar degenerative disc disease (722 52) (M51 36)   23  Menorrhagia (626 2) (N92 0)   24  Morbid or severe obesity due to excess calories (278 01) (E66 01)   25  Obsessive-compulsive disorder (300 3) (F42 9)   26  Obstructive sleep apnea (327 23) (G47 33)   27  Otalgia, unspecified laterality (388 70) (H92 09)   28  Prediabetes (790 29) (R73 09)   29  RLS (restless legs syndrome) (333 94) (G25 81)   30  Urinary tract infection (599 0) (N39 0)   31  URTI (acute upper respiratory infection) (465 9) (J06 9)   32  Viral bronchitis (466 0) (J20 8)   33  Vitamin D deficiency (268 9) (E55 9)    Past Medical History    1  History of Abscess of face (682 0) (L02 01)   2  History of Acute nonsuppurative otitis media, unspecified laterality (381 00) (H65 199)   3  History of Backache (724 5) (M54 9)   4  History of Candidiasis, cutaneous (112 3) (B37 2)   5  History of Dog bite (879 8,E906 0) (W54  0XXA)   6  History of Dysfunction of left eustachian tube (381 81) (H69 82)   7  History of acute sinusitis (V12 69) (Z87 09)   8  Denied: History of head injury   9  History of Pseudotumor cerebri (348 2) (G93 2)   10  Denied: History of Seizures   11  History of Suicide and self-inflicted injury (W448 6) (X38 2ZIX)    The active problems and past medical history were reviewed and updated today  Allergies    1  No Known Drug Allergies    Current Meds   1  Accu-Chek Martha Device; test 4 times daily; Therapy: 76TIR8143 to (Last Rx:57Ktf9587)  Requested for: 48TBL3457 Ordered   2  Accu-Chek Martha STRP; TEST 4 TIMES DAILY; Therapy: 19HQQ7466 to (Evaluate:29Tkw2079)  Requested for: 81JBB6138; Last   Rx:79Wpb5012 Ordered   3  Accu-Chek Soft Touch Lancets Miscellaneous; test blood sugar four times daily;    Therapy: 17TMX5951 to (Evaluate:13Apr2016)  Requested for: 95VPA2796; Last   Rx:87Awa5386 Ordered   4  Azithromycin 250 MG Oral Tablet; TAKE 2 TABLETS ON DAY 1 THEN TAKE 1 TABLET A   DAY FOR 4 DAYS; Therapy: 46ZFX6697 to (Last Rx:08Oct2016)  Requested for: 94FOV2489 Ordered   5  Benzonatate 200 MG Oral Capsule; TAKE 1 CAPSULE 2-3 TIMES DAILY; Therapy: 94MKS2347 to (Evaluate:13Oct2016)  Requested for: 55BDP0209; Last   Rx:08Oct2016 Ordered   6  FLUoxetine HCl - 20 MG Oral Capsule; TAKE 1 CAPSULE DAILY; Therapy: 91VHN4055 to (JHUOJWPS:95PFZ7846)  Requested for: 10ZXX1268; Last   Rx:03Oct2016 Ordered   7  Hydrocodone-Acetaminophen 5-325 MG Oral Tablet; TAKE 1 TABLET 3 times daily PRN   as needed for pain; Therapy: 87BTC5553 to (Evaluate:28Nov2014); Last Rx:31Oct2014 Ordered   8  LamoTRIgine 200 MG Oral Tablet; TAKE 2 TABLETS AT BEDTIME; Therapy: 56IZA6764 to (QLFXYDSN:61SCD6662)  Requested for: 06JPP5718; Last   Rx:03Oct2016 Ordered   9  Latuda 120 MG Oral Tablet; TAKE 1 TABLET IN THE EVENING WITH FOOD; Therapy: 21Apr2016 to (Evaluate:31Jan2017)  Requested for: 19KDQ9631; Last   Rx:03Oct2016 Ordered   10  Levocetirizine Dihydrochloride 5 MG Oral Tablet; TAKE 1 TABLET BY MOUTH ONCE A    DAY; Therapy: 16Zql7427 to (Evaluate:18Apr2017)  Requested for: 60ZQS9678; Last    Rx:63Pnh8812 Ordered   11  Levothyroxine Sodium 100 MCG Oral Tablet; take 1 tablet by mouth every day; Therapy: 34JUM3684 to (Evaluate:78Qbq0370)  Requested for: 89WXI5163; Last    Rx:09Nov2016 Ordered   12  Lisinopril 10 MG Oral Tablet; TAKE 1 TABLET BY MOUTH EVERY DAY FOR BLOOD    PRESSURE; Therapy: 64CLH8576 to (Evaluate:25Icc1941)  Requested for: 63JYD6771; Last    Rx:15Jan2017 Ordered   13  Lithium Carbonate  MG Oral Tablet Extended Release; take 2 tablets at bedtime; Therapy: 59DBH0680 to (Evaluate:48Lvv5439)  Requested for: 20Jan2017; Last    Rx:20Jan2017 Ordered   14  LORazepam 0 5 MG Oral Tablet;  Take 1 tablet 3 times daily  as needed; Therapy: 12Ncg4035 to (Evaluate:31Jan2017); Last Rx:03Oct2016 Ordered   15  MetFORMIN HCl  MG Oral Tablet Extended Release 24 Hour; TAKE 1 TABLET BY    MOUTH EVERY DAY WITH SUPPER; Therapy: 78FLY1651 to (Evaluate:01Mar2017)  Requested for: 75Lfa7438; Last    Rx:67Gxa2557 Ordered   16  Omeprazole 20 MG Oral Capsule Delayed Release; Take 1 capsule twice daily; Therapy: 13XDD9159 to (Evaluate:40Jfh0764)  Requested for: 88Dgr6219; Last    Rx:16Kdg7788 Ordered   17  Proventil  (90 Base) MCG/ACT Inhalation Aerosol Solution; 2 PUFFS Q 4 HRS; Therapy: 48WLS6602 to (Last HZ:86MQF7152)  Requested for: 13IDB9951 Ordered   18  ROPINIRole HCl - 0 25 MG Oral Tablet; TAKE 2 TABLETS BY MOUTH DAILY AT BEDTIME; Therapy: 59Lno1139 to (Bernadette Carrillo)  Requested for: 43SYG4538; Last    Rx:08Jan2017 Ordered    The medication list was reviewed and updated today  Family Psych History  Mother    1  Family history of bicuspid aortic valve (V17 49) (Z82 79)   2  Family history of depression (V17 0) (Z81 8)   3  Family history of Hypertension (V17 49)   4  Family history of Status post aortic valve replacement  Father    5  Family history of Hypertension (V17 49)   6  Family history of Hypothyroidism   7  No family history of mental disorder  Brother    8  Family history of Hypertension (V17 49)  Maternal Grandmother    9  Family history of Cancer   10  Family history of cerebrovascular accident (V17 1) (Z82 3)   11  Family history of Heart Disease (V17 49)  Paternal Grandmother    15  Family history of Cancer   13  Family history of malignant neoplasm (V16 9) (Z80 9)  Maternal Grandfather    15  Family history of pneumonia (V18 8) (Z83 1)  Paternal Grandfather    13  Family history of pneumonia (V18 8) (Z83 1)  Family History    12  Family history of Arthritis (716 90) (M19 90)   17  Family history of Breast Cancer (V16 3)   18  Family history of osteoporosis (V17 81) (Z82 62)   19   Family history of Hypertension (V17 49)   20  Family history of Transient Ischemic Attack    The family history was reviewed and updated today  Social History    · Caffeine Use   · Currently sexually active   · Disabled   · Former smoker (H51 36) (O27 382)   · High school graduate   · Lives with parents   · Never smoked   · No alcohol use   · Tea  The social history was reviewed and is unchanged  Single, lives with mother and stepfather  No children  Some college  On disability  Worked in past in retail  No history of legal problems  No  history  History Of Phys/Sex Abuse Or Perpetration    History Of Phys/Sex Abuse or Perpetration: No history of physical or sexual abuse  End of Encounter Meds    1  Levocetirizine Dihydrochloride 5 MG Oral Tablet; TAKE 1 TABLET BY MOUTH ONCE A   DAY; Therapy: 25Vnf7404 to (Evaluate:18Apr2017)  Requested for: 03UXR4199; Last   Rx:94Glm3602 Ordered    2  LamoTRIgine 200 MG Oral Tablet; TAKE 2 TABLETS AT BEDTIME; Therapy: 70WYV8943 to (Usha Alcazar)  Requested for: 52YLI1473; Last   Rx:27Jan2017 Ordered   3  Latuda 120 MG Oral Tablet; TAKE 1 TABLET IN THE EVENING WITH FOOD; Therapy: 14Rav1797 to (Usha Alcazar)  Requested for: 36BHL6322; Last   Rx:27Jan2017 Ordered   4  Lithium Carbonate  MG Oral Tablet Extended Release; take 2 tablets at bedtime; Therapy: 41GOC0797 to (Usha Alcazar)  Requested for: 45YRG1716; Last   Rx:27Jan2017 Ordered    5  FLUoxetine HCl - 20 MG Oral Capsule; TAKE 1 CAPSULE DAILY; Therapy: 58FHM6789 to (Usha Alcazar)  Requested for: 62YSX2105; Last   Rx:27Jan2017 Ordered    6  Azithromycin 250 MG Oral Tablet; TAKE 2 TABLETS ON DAY 1 THEN TAKE 1 TABLET A   DAY FOR 4 DAYS; Therapy: 64KWB3491 to (Last Rx:08Oct2016)  Requested for: 25MJP2370 Ordered   7  Benzonatate 200 MG Oral Capsule; TAKE 1 CAPSULE 2-3 TIMES DAILY;    Therapy: 16LRM4202 to (Evaluate:13Oct2016)  Requested for: 80KZA9134; Last   Rx:08Oct2016 Ordered 8  Omeprazole 20 MG Oral Capsule Delayed Release; Take 1 capsule twice daily; Therapy: 53XWI0128 to (Evaluate:87Aov7823)  Requested for: 49Lfn8095; Last   Rx:34Egz7701 Ordered    9  LORazepam 0 5 MG Oral Tablet; Take 1 tablet 3 times daily  as needed; Therapy: 75Mzy4148 to (Evaluate:52Vni0721); Last Rx:27Jan2017 Ordered    10  Lisinopril 10 MG Oral Tablet; TAKE 1 TABLET BY MOUTH EVERY DAY FOR BLOOD    PRESSURE; Therapy: 09HZX1590 to (Evaluate:99Bxu2393)  Requested for: 30EFD6248; Last    Rx:15Jan2017 Ordered    11  Levothyroxine Sodium 100 MCG Oral Tablet (Synthroid); take 1 tablet by mouth every day; Therapy: 10FHM0207 to (Evaluate:51Dlw0397)  Requested for: 35OAZ6787; Last    Rx:09Nov2016 Ordered    12  Hydrocodone-Acetaminophen 5-325 MG Oral Tablet; TAKE 1 TABLET 3 times daily PRN    as needed for pain; Therapy: 46JHK2342 to (Evaluate:28Nov2014); Last Rx:57Pri1969 Ordered    13  Accu-Chek Martha Device; test 4 times daily; Therapy: 21JYP6108 to (Last Rx:22Ybe0084)  Requested for: 83SCN1843 Ordered   14  Accu-Chek Martha STRP; TEST 4 TIMES DAILY; Therapy: 86WNS1530 to (Evaluate:35Qnw6754)  Requested for: 39UZD6123; Last    Rx:72Csh5830 Ordered   15  Accu-Chek Soft Touch Lancets Miscellaneous; test blood sugar four times daily; Therapy: 85WVF1100 to (Evaluate:55Vev8746)  Requested for: 41BCN5399; Last    Rx:44Wai1971 Ordered   16  MetFORMIN HCl  MG Oral Tablet Extended Release 24 Hour; TAKE 1 TABLET BY    MOUTH EVERY DAY WITH SUPPER; Therapy: 32MDV5639 to (Amish Gonzalez)  Requested for: 80Lsr9471; Last    Rx:47Xld0362 Ordered    17  ROPINIRole HCl - 0 25 MG Oral Tablet; TAKE 2 TABLETS BY MOUTH DAILY AT BEDTIME; Therapy: 21Uep1758 to (Elian Menjivar)  Requested for: 81PVL5550; Last    Rx:08Jan2017 Ordered    18  Proventil  (90 Base) MCG/ACT Inhalation Aerosol Solution; 2 PUFFS Q 4 HRS;     Therapy: 24NYS2889 to (Last AU:23VEV0186)  Requested for: 75RAT2076 Ordered    Future Appointments    Date/Time Provider Specialty Site   01/31/2017 05:00 PM Bárbara Pineda, [address] [facility]   02/02/2017 01:00 PM Bárbara Pineda, [address] [facility]   02/07/2017 05:00 PM Bárbara Pineda, [place]   02/09/2017 10:00 AM Bárbara Pineda, [address] [facility]   02/14/2017 05:00 PM Bárbara Pineda, [address] [facility]   02/16/2017 01:00 PM Bárbara Pineda, [address] [facility]   02/21/2017 05:00 PM Bárbara Pineda, [place]   02/23/2017 01:00 PM Bárbara Pineda, [address] [facility]   02/28/2017 05:00 PM Bárbara Pineda, [place]   03/02/2017 01:00 PM Bárbara Pineda, [address] [facility]   03/07/2017 05:00 PM Bárbara Pineda, [address] [facility]   03/14/2017 05:00 PM Bárbara Pineda, [address] [facility]   03/16/2017 03:00 PM Bárbara Pineda, [address] [facility]   03/21/2017 05:00 PM Bárbara Pinedas, [place]   03/23/2017 01:00 PM Bárbara Pinedas, [place]   03/28/2017 05:00 PM Bárbara Pinedas, [place]   03/30/2017 01:00 PM Bárbara Pinedas, [place]   04/04/2017 05:00 PM Bárbara Pinedas, [place]   04/11/2017 05:00 PM SwethaTammi alejorigoberto [facility], [place]     Signatures   Electronically signed by : PAUL Mehta ; Jan 27 2017  4:12PM EST                       (Author)

## 2018-01-11 NOTE — PSYCH
Progress Note  Psychotherapy Provided [facility]: Group Therapy provided today  Goals addressed in session:   1 D: Above individual attended Trauma Group  Members engaged in discussion to welcome new member  A: Elizabeth spoke at length during today's group about her current anxiety and fears  She accepted the support of her peers in return  P: She will continue to be seen for individual and group therapy during which the above will be further processed  Pain Scale and Suicide Risk [facility]: Current Pain Assessment: moderate to severe   On a scale of 0 to 10, the patient rates current pain at 7   Current suicide risk is low   Behavioral Health Treatment Plan [facility]: Diagnosis and Treatment Plan explained to patient, patient relates understanding diagnosis and is agreeable to Treatment Plan  Assessment    1   EILEEN (generalized anxiety disorder) (300 02) (F41 1)    Signatures   Electronically signed by : Ike Velazquez LCSW; Aug 25 2017 11:34AM EST                       (Author)

## 2018-01-11 NOTE — PLAN OF CARE
Problem: DISCHARGE PLANNING  Goal: Discharge to home or other facility with appropriate resources  INTERVENTIONS:  - Identify barriers to discharge w/patient and caregiver  - Arrange for needed discharge resources and transportation as appropriate  - Identify discharge learning needs (meds, wound care, etc )  - Arrange for interpretive services to assist at discharge as needed  - Refer to Case Management Department for coordinating discharge planning if the patient needs post-hospital services based on physician/advanced practitioner order or complex needs related to functional status, cognitive ability, or social support system   Outcome: Completed Date Met: 01/11/18  Patient denied all symptoms and appeared stable for discharge  Reviewed goals of outpatient treatment, with therapist's new guidelines, and patient agrees to them  Patient does not wish to have PHP services at time of discharge  Reviewed appointment dates, patient in agreement and sees the need for continued treatment

## 2018-01-11 NOTE — PROGRESS NOTES
called by psych with a consult and news that patient leaving at 12PM  It is 11:20AM  Consult was reportedly put in at 9pm last night but not put on our census and unclear if ever called in before now  I spoke with Dr Kely Weems  He said pt has no complaints  Reviewed chart  Afebrile  No UA done  Leukocytosis mild  She is on lithium which can cause this  She also per HPI recently had mild toe infection tx with abx as outpatient  ?If this is still related  Psych relays toe looks fine and pt has no complaints  Unfortunately will not be able to see patient STAT  Asked psych to have patient repeat labs in a few days and f/u with her PCP      Aristeo Rosas, YANG

## 2018-01-11 NOTE — PSYCH
Progress Note  Psychotherapy Provided [facility]: Group Therapy provided today  Goals addressed in session:   1 D: The above was seen for Group Therapy session  Group members engaged in a therapeutic activity in which they learned to anchor and tap into their heart center  A:Elizabeth very much appeared to enjoy and benefit from today's activity  P: She will be encouraged to continue to attend individual therapy weekly and the Adult ED group on a biweekly basis  Pain Scale and Suicide Risk [facility]: On a scale of 0 to 10, the patient rates current pain at 4   Current suicide risk is low   Behavioral Health Treatment Plan [facility]: Diagnosis and Treatment Plan explained to patient, patient relates understanding diagnosis and is agreeable to Treatment Plan  Assessment    1   Binge-eating disorder, severe (079 6) (W84 45)    Signatures   Electronically signed by : Trenton Fajardo LCSW; Sep 28 2017  1:21PM EST                       (Author)

## 2018-01-11 NOTE — PSYCH
Psych Med Mgmt    Appearance: was calm and cooperative, adequate hygiene and grooming and good eye contact  Observed mood: depressed and anxious  Observed mood: affect was constricted  Speech: a normal rate  Thought processes: coherent/organized  Hallucinations: no hallucinations present  Abnormal Thoughts: The patient has obsessive thought content, but no suicidal thoughts and no homicidal thoughts  Orientation: The patient is oriented to person, place and time  Recent and Remote Memory: short term memory intact and long term memory intact  Attention Span And Concentration: concentration impaired  Insight: Insight intact  Judgment: Her judgment was intact  Muscle Strength And Tone  Muscle strength and tone were normal  Normal gait and station  Language: no difficulty naming common objects  Fund of knowledge: Patient displays adequate knowledge of current events, adequate fund of knowledge regarding past history and adequate fund of knowledge regarding vocabulary  The patient is experiencing no localized pain  Treatment Recommendations: Continue Lithium 900 mg at bedtime; will check Lithium level and BMP before next visit  Continue Ativan 0 5 mg tid PRN  Continue Abilify 30 mg at bedtime  Continue Lamictal 400 mg at bedtime  Increase Prozac 60 mg daily  Continue therapy with Lucy Records; also attends eating disorders group  Follows with PCP for glucose and lipid monitoring  Risks, Benefits And Possible Side Effects Of Medications: Risks, benefits, and possible side effects of medications explained to patient and patient verbalizes understanding, Risks of medications explained if female patient  Patient verbalizes understanding and agrees to notify her doctor if she becomes pregnant  She reports increased appetite, decreased energy, recent 1 lbs weight gain  and decrease in number of sleep hours 5       Patient states she still has been feeling anxious and also still has frequent obsessive thoughts  Concerned with impulsive eating and also spending money on [employer] tickets  Some depression on and off "not too bad"  No suicidality or homicidality  No psychotic symptoms  Sleep and energy levels fluctuate  Reports mild hand tremor recently, no other side effects from medications reported  No rash  Vitals  Signs [Data Includes: Current Encounter]   Recorded: 29Ssd0631 04:19PM   Heart Rate: 72  Systolic: 491  Diastolic: 78  BP Cuff Size: Large  Height: 5 ft 8 in  Weight: 368 lb   BMI Calculated: 55 95  BSA Calculated: 2 65    Assessment    1  Bipolar I disorder, most recent episode depressed, moderate (296 52) (F31 32)   2  Eating disorder, unspecified (307 50) (F50 9)   3  EILEEN (generalized anxiety disorder) (300 02) (F41 1)   4  Obsessive-compulsive disorder (300 3) (F42)    Plan    1  (1) BASIC METABOLIC PROFILE; Status:Active; Requested for:56Qmw6867;    2  (1) LITHIUM; Status:Active; Requested for:30Cuz1217;     3  Follow-up visit in 6 weeks Evaluation and Treatment  Follow-up  Status: Hold For -   Scheduling  Requested for: 87OBY4242    4  From  FLUoxetine HCl - 40 MG Oral Capsule TAKE 1 CAPSULE DAILY To   FLUoxetine HCl - 20 MG Oral Capsule TAKE 3 CAPSULES DAILY    Review of Systems    Constitutional: recent 1 lb weight gain  Cardiovascular: no complaints of slow or fast heart rate, no chest pain, no palpitations  Respiratory: no complaints of shortness of breath, no wheezing, no dyspnea on exertion  Gastrointestinal: no complaints of abdominal pain, no constipation, no nausea, no diarrhea, no vomiting  Genitourinary: no complaints of dysuria, no incontinence, no pelvic pain, no urinary frequency  Musculoskeletal: no complaints of arthralgia, no myalgias, no limb pain, no joint stiffness  Integumentary: no complaints of skin rash, no itching, no dry skin  Neurological: minimal hand tremor        Past Psychiatric History    Past Psychiatric History: Multiple past inpatient psychiatric admissions  One past suicide attempt by overdose in 2009  Active Problems    1  Abnormal weight gain (783 1) (R63 5)   2  Acute diverticulitis (562 11) (K57 92)   3  Acute pain (338 19) (R52)   4  Allergic rhinitis (477 9) (J30 9)   5  Amenorrhea (626 0) (N91 2)   6  Bipolar I disorder, most recent episode depressed, moderate (296 52) (F31 32)   7  Bulging lumbar disc (722 10) (M51 26)   8  Dizziness (780 4) (R42)   9  Dysfunctional uterine bleeding (626 8) (N93 8)   10  Dysuria (788 1) (R30 0)   11  Eating disorder, unspecified (307 50) (F50 9)   12  Encounter for routine gynecological examination (V72 31) (Z01 419)   13  Esophageal reflux (530 81) (K21 9)   14  Foot pain (729 5) (M79 673)   15  EILEEN (generalized anxiety disorder) (300 02) (F41 1)   16  Gambling disorder, moderate (312 31) (F63 0)   17  Hypertension (401 9) (I10)   18  Hypothyroidism (244 9) (E03 9)   19  Lumbago (724 2) (M54 5)   20  Lumbar degenerative disc disease (722 52) (M51 36)   21  Menorrhagia (626 2) (N92 0)   22  Morbid or severe obesity due to excess calories (278 01) (E66 01)   23  Obsessive-compulsive disorder (300 3) (F42)   24  Obstructive sleep apnea (327 23) (G47 33)   25  Otalgia, unspecified laterality (388 70) (H92 09)   26  Prediabetes (790 29) (R73 09)   27  URTI (acute upper respiratory infection) (465 9) (J06 9)   28  Viral bronchitis (466 0) (J20 8)   29  Vitamin D deficiency (268 9) (E55 9)    Past Medical History    1  History of Abscess of face (682 0) (L02 01)   2  History of Acute nonsuppurative otitis media, unspecified laterality (381 00) (H65 199)   3  History of Backache (724 5) (M54 9)   4  History of Candidiasis, cutaneous (112 3) (B37 2)   5  History of Dog bite (879 8,E906 0) (W54  0XXA)   6  History of Dysfunction of left Eustachian tube (381 81) (H69 82)   7  History of acute sinusitis (V12 69) (Z87 09)   8  Denied: History of head injury   9   History of Pseudotumor cerebri (348  2) (G93 2)   10  Denied: History of Seizures   11  History of Suicide and self-inflicted injury (D917 0) (O05 5DHF)    The active problems and past medical history were reviewed and updated today  Allergies    1  No Known Drug Allergies    Current Meds   1  Accu-Chek Martha Device; test 4 times daily; Therapy: 01YWT3880 to (Last Rx:20Bkn7822)  Requested for: 03IIV6058 Ordered   2  Accu-Chek Martha In Vitro Strip; TEST 4 TIMES DAILY; Therapy: 46BQN1313 to (Evaluate:15May2016)  Requested for: 83PCZ2916; Last   Rx:59Ich7725 Ordered   3  Accu-Chek Soft Touch Lancets Miscellaneous; test blood sugar four times daily; Therapy: 64RKH3284 to (Evaluate:13Apr2016)  Requested for: 83ASE8603; Last   Rx:04Xmi7670 Ordered   4  ARIPiprazole 30 MG Oral Tablet; TAKE 1 TABLET AT BEDTIME  Requested for:   23RUY3217; Last Rx:11Jan2016 Ordered   5  FLUoxetine HCl - 40 MG Oral Capsule; TAKE 1 CAPSULE DAILY; Therapy: 56WYP0205 to (Evaluate:10May2016)  Requested for: 88RMJ4978; Last   Rx:11Jan2016 Ordered   6  Hydrocodone-Acetaminophen 5-325 MG Oral Tablet; TAKE 1 TABLET 3 times daily PRN   as needed for pain; Therapy: 92AVM3009 to (Evaluate:28Nov2014); Last Rx:31Oct2014 Ordered   7  LamoTRIgine 200 MG Oral Tablet; TAKE 2 TABLETS AT BEDTIME; Therapy: 17VKO6251 to (Evaluate:10May2016)  Requested for: 86IBS1927; Last   Rx:11Jan2016 Ordered   8  Lansoprazole 15 MG Oral Capsule Delayed Release; take 1 capsule daily; Last   Rx:86Qya6157 Ordered   9  Levocetirizine Dihydrochloride 5 MG Oral Tablet; take 1 tablet by mouth every day; Therapy: 65ATQ5624 to (Saravanan Young)  Requested for: 53BOZ5932; Last   Rx:04Jan2016 Ordered   10  Levothyroxine Sodium 100 MCG Oral Tablet; take 1 tablet by mouth every day; Therapy: 82FJB3407 to (Eder Griffith)  Requested for: 20EKW2006; Last    Rx:29Jan2016 Ordered   11  Lisinopril 10 MG Oral Tablet; TAKE 1 TABLET BY MOUTH EVERY DAY FOR BLOOD    PRESSURE;     Therapy: 47UJH9835 to (Evaluate:10Jun2016)  Requested for: 75QQA0097; Last    Rx:70Use1450 Ordered   12  Lithium Carbonate  MG Oral Tablet Extended Release; TAKE 2 TABLETS AT    BEDTIME; Therapy: 72LUI6820 to (Evaluate:10May2016)  Requested for: 75HWE6710; Last    Rx:11Jan2016 Ordered   13  LORazepam 0 5 MG Oral Tablet; Take 1 tablet 3 times daily  as needed; Therapy: 48Dny3823 to (Evaluate:10May2016); Last Rx:11Jan2016 Ordered   14  Medrol (Pete) 4 MG Oral Tablet; TAKE AS DIRECTED; Therapy: 37IWJ3407 to (Last GA:56LTW2919)  Requested for: 10IDJ2599 Ordered   15  MetFORMIN HCl  MG Oral Tablet Extended Release 24 Hour; 1 tab daily with    supper; Therapy: 21IPH3736 to (Evaluate:03Jan2016)  Requested for: 44KCT5567; Last    Rx:30Tff0132 Ordered   16  MetroNIDAZOLE 500 MG Oral Tablet; TAKE 1 TABLET EVERY 6 HOURS DAILY; Therapy: 76QIF3071 to Recorded   17  Nitrofurantoin Monohyd Macro 100 MG Oral Capsule; TAKE 1 CAPSULE EVERY 12    HOURS DAILY; Therapy: 90BKD3169 to (Silva Councilman)  Requested for: 85LLH1494; Last    Rx:57Doz9947 Ordered   18  Penicillin V Potassium 500 MG Oral Tablet; TAKE 1 TABLET 3 TIMES DAILY UNTIL GONE;    Therapy: 07OFM3644 to (Evaluate:95Aex4833)  Requested for: 76QCY8723; Last    GE:09VOC1943 Ordered   19  Promethazine-Codeine 6 25-10 MG/5ML Oral Syrup; TAKE 5 ML EVERY 4 TO 6 HOURS    AS NEEDED FOR COUGH; Therapy: 24PDG1342 to (Evaluate:05Yvm0423); Last Rx:27Jan2016 Ordered   20  Proventil  (90 Base) MCG/ACT Inhalation Aerosol Solution; 2 PUFFS Q 4 HRS; Therapy: 54QAY8610 to (Last RP:54LXX5827)  Requested for: 10YHA5705 Ordered    The medication list was reviewed and updated today  Family Psych History    1  Family history of bicuspid aortic valve (V17 49) (Z82 79)   2  Family history of depression (V17 0) (Z81 8)   3  Family history of Hypertension (V17 49)   4  Family history of Status post aortic valve replacement    5   Family history of Hypertension (V17 49) 6  Family history of Hypothyroidism   7  No family history of mental disorder    8  Family history of Hypertension (V17 49)    9  Family history of Cancer   10  Family history of cerebrovascular accident (V17 1) (Z82 3)   11  Family history of Heart Disease (V17 49)    12  Family history of Cancer   13  Family history of malignant neoplasm (V16 9) (Z80 9)    14  Family history of pneumonia (V18 8) (Z83 1)    13  Family history of pneumonia (V18 8) (Z83 1)    12  Family history of Arthritis (716 90) (M19 90)   17  Family history of Breast Cancer (V16 3)   18  Family history of osteoporosis (V17 81) (Z82 62)   19  Family history of Hypertension (V17 49)   20  Family history of Transient Ischemic Attack    The family history was reviewed and updated today  Social History    · Caffeine Use   · Currently sexually active   · Disabled   · Former smoker (M04 01) (Q95 830)   · High school graduate   · Lives with parents   · Never smoked   · No alcohol use   · Tea  The social history was reviewed and updated today  The social history was reviewed and is unchanged  Single, lives with mother and stepfather  No children  Some college  On disability  Worked in past in retail  No history of legal problems  No  history  History Of Phys/Sex Abuse Or Perpetration    History Of Phys/Sex Abuse or Perpetration: No history of physical or sexual abuse  End of Encounter Meds    1  MetroNIDAZOLE 500 MG Oral Tablet; TAKE 1 TABLET EVERY 6 HOURS DAILY; Therapy: 91LNT3562 to Recorded    2  Levocetirizine Dihydrochloride 5 MG Oral Tablet; take 1 tablet by mouth every day; Therapy: 64KFF6051 to (Meli Villanueva)  Requested for: 59FYW5497; Last   Rx:04Jan2016 Ordered    3  ARIPiprazole 30 MG Oral Tablet (Abilify); TAKE 1 TABLET AT BEDTIME  Requested for:   32RVU2106; Last Rx:11Jan2016 Ordered   4  LamoTRIgine 200 MG Oral Tablet; TAKE 2 TABLETS AT BEDTIME;    Therapy: 84HEN8474 to (Evaluate:19Mll2499)  Requested for: 26SMH0416; Last   Rx:11Jan2016 Ordered   5  Lithium Carbonate  MG Oral Tablet Extended Release; TAKE 2 TABLETS AT   BEDTIME; Therapy: 97SHI6252 to (Evaluate:10May2016)  Requested for: 63NGR3080; Last   Rx:11Jan2016 Ordered    6  FLUoxetine HCl - 20 MG Oral Capsule; TAKE 3 CAPSULES DAILY; Therapy: 99PLQ3629 to (Evaluate:23Jun2016)  Requested for: 36Lug4745; Last   Rx:28Qwg5273 Ordered    7  Nitrofurantoin Monohyd Macro 100 MG Oral Capsule; TAKE 1 CAPSULE EVERY 12   HOURS DAILY; Therapy: 03RDR2315 to (Li Pole)  Requested for: 85OEQ9774; Last   Rx:96Dbk9414 Ordered    8  Lansoprazole 15 MG Oral Capsule Delayed Release (Prevacid); take 1 capsule daily; Last Rx:88Zyg6329 Ordered    9  LORazepam 0 5 MG Oral Tablet; Take 1 tablet 3 times daily  as needed; Therapy: 82Urr0056 to (Evaluate:10May2016); Last Rx:11Jan2016 Ordered    10  Lisinopril 10 MG Oral Tablet; TAKE 1 TABLET BY MOUTH EVERY DAY FOR BLOOD    PRESSURE; Therapy: 36NGJ7282 to (Evaluate:10Jun2016)  Requested for: 15ORY1745; Last    Rx:11Feb2016 Ordered    11  Levothyroxine Sodium 100 MCG Oral Tablet (Synthroid); take 1 tablet by mouth every day; Therapy: 26RNT6365 to (Bereket Miller)  Requested for: 75YIG2715; Last    Rx:29Jan2016 Ordered    12  Hydrocodone-Acetaminophen 5-325 MG Oral Tablet; TAKE 1 TABLET 3 times daily PRN    as needed for pain; Therapy: 17TFS5425 to (Evaluate:28Nov2014); Last Rx:31Oct2014 Ordered    13  Accu-Chek Martha Device; test 4 times daily; Therapy: 40WXR4264 to (Last Rx:22Mvl3425)  Requested for: 15AVH6893 Ordered   14  Accu-Chek Martha In Vitro Strip; TEST 4 TIMES DAILY; Therapy: 68UZC7814 to (Evaluate:15May2016)  Requested for: 61AFT1929; Last    Rx:27Hwh0977 Ordered   15  Accu-Chek Soft Touch Lancets Miscellaneous; test blood sugar four times daily; Therapy: 01AVB9791 to (Evaluate:13Apr2016)  Requested for: 88SIV6653; Last    Rx:17Lis0417 Ordered   16   MetFORMIN HCl  MG Oral Tablet Extended Release 24 Hour; 1 tab daily with    supper; Therapy: 76MYG1239 to (Evaluate:03Jan2016)  Requested for: 89WAQ7580; Last    Rx:94Nlb1238 Ordered    17  Medrol (Pete) 4 MG Oral Tablet (MethylPREDNISolone (Pete)); TAKE AS DIRECTED; Therapy: 89ELE7670 to (Last KH:86BNM7466)  Requested for: 62VDZ7152 Ordered   18  Penicillin V Potassium 500 MG Oral Tablet; TAKE 1 TABLET 3 TIMES DAILY UNTIL GONE;    Therapy: 44KIS5284 to (Evaluate:06Feb2016)  Requested for: 40KDF4500; Last    IN:87TQD8984 Ordered   19  Promethazine-Codeine 6 25-10 MG/5ML Oral Syrup; TAKE 5 ML EVERY 4 TO 6 HOURS    AS NEEDED FOR COUGH; Therapy: 65OVK0033 to (Evaluate:56Pnt7097); Last Rx:27Jan2016 Ordered   20  Proventil  (90 Base) MCG/ACT Inhalation Aerosol Solution; 2 PUFFS Q 4 HRS; Therapy: 85FSV5424 to (Last Rx:27Jan2016)  Requested for: 12WBX8833 Ordered    Future Appointments    Date/Time Provider Specialty Site   12/09/2016 08:10 AM PAUL Drummond   Endocrinology [facility] ENDOCRINOLOGY   02/26/2016 01:00 PM Bárbara Pineda, [address] [facility]   03/04/2016 01:00 PM Bárbara Pineda, [address] [facility]   03/10/2016 01:00 PM Bárbara Pineda, [address] [facility]   03/17/2016 01:00 PM GuildB[facility]bara norris, [address] [facility]   03/24/2016 01:00 PM Bárbara Pineda, [address] [facility]   04/07/2016 01:00 PM GuildB[facility]bara norris, [address] [facility]   04/14/2016 01:00 PM Dyllan Galloway     Signatures   Electronically signed by : PAUL Rivas ; Feb 24 2016  4:34PM EST                       (Author)

## 2018-01-11 NOTE — PSYCH
Treatment Plan Tracking    #1 Treatment Plan not completed within required time limits due to: Client presented with emotional/behavioral issues that required clinical intervention            Signatures   Electronically signed by : Claudetta Neighbors, LCSW; Feb 2 2017 10:52AM EST                       (Author)

## 2018-01-12 ENCOUNTER — GENERIC CONVERSION - ENCOUNTER (OUTPATIENT)
Dept: OTHER | Facility: OTHER | Age: 33
End: 2018-01-12

## 2018-01-12 VITALS — HEART RATE: 64 BPM | SYSTOLIC BLOOD PRESSURE: 130 MMHG | DIASTOLIC BLOOD PRESSURE: 80 MMHG

## 2018-01-12 VITALS — DIASTOLIC BLOOD PRESSURE: 62 MMHG | SYSTOLIC BLOOD PRESSURE: 118 MMHG | HEART RATE: 72 BPM | HEIGHT: 68 IN

## 2018-01-12 VITALS — DIASTOLIC BLOOD PRESSURE: 72 MMHG | HEART RATE: 76 BPM | SYSTOLIC BLOOD PRESSURE: 114 MMHG

## 2018-01-12 NOTE — PSYCH
Progress Note  Psychotherapy Provided [facility]: Individual Psychotherapy 50 minutes provided today  Goals addressed in session:   1-3 D: Met with pt for Individual Therapy session  Elizabeth spoke today about her feelings of guilt as a result of feelings of attraction toward a female peer  This was processed for the duration of today's session  A: Elizabeth was once again extremely open and insightful as she processed the above  She was able to acknowledge a decrease in her OCD thoughts as a result of today's session  P: Upcoming sessions will be used to continue to address the above  She will continue to attend both weekly Trauma and ED group therapy sessions  Pain Scale and Suicide Risk [facility]: Current Pain Assessment: moderate to severe   On a scale of 0 to 10, the patient rates current pain at 4   Current suicide risk is low   Behavioral Health Treatment Plan [facility]: Diagnosis and Treatment Plan explained to patient, patient relates understanding diagnosis and is agreeable to Treatment Plan  Assessment    1  Bipolar I disorder, most recent episode mixed, moderate (296 62) (F31 62)   2  EILEEN (generalized anxiety disorder) (300 02) (F41 1)   3   Obsessive-compulsive disorder (300 3) (F42 9)    Signatures   Electronically signed by : Atul Leary LCSW; Oct 13 2016  1:58PM EST                       (Author)

## 2018-01-12 NOTE — PSYCH
Progress Note  Psychotherapy Provided [facility]: Individual Psychotherapy 50 minutes provided today  Goals addressed in session:   1-3 D: Met with pt for Individual Therapy session  Elizabeth spoke today about her feelings re: her struggles to manage her OCD, her body image, and her trauma history today  A: Elizabeth was extremely open and insightful as she processed the above  She was able to share things that she "has been thinking about for the past 10 years "   P: Upcoming sessions will be used to continue to address the above  She will continue to attend both weekly Trauma and ED group therapy sessions  Pain Scale and Suicide Risk  [facility]: Current Pain Assessment: moderate to severe   On a scale of 0 to 10, the patient rates current pain at 6   Current suicide risk is low   Behavioral Health Treatment Plan [address] [place] [address] [address]: Diagnosis and Treatment Plan explained to patient, patient relates understanding diagnosis and is agreeable to Treatment Plan  Assessment    1  Bipolar 1 disorder, depressed, moderate (296 52) (F31 32)   2  Obsessive-compulsive disorder (300 3) (F42 9)   3   Binge-eating disorder, severe (783 6) (F50 81)    Signatures   Electronically signed by : Nael Elena LCSW; Sep 30 2016 11:58AM EST                       (Author)

## 2018-01-12 NOTE — PSYCH
Progress Note  Psychotherapy Provided [facility]: Individual Psychotherapy 50 minutes provided today  Goals addressed in session:   1-3 D: Met with pt for Individual Therapy session  Elizabeth spoke today about her feelings re: her progress in asserting herself with her mother but ongoing work to accept that she will not change  A: Jagjit Cunningham continues to develop trust in this worker as she shares her feelings and behaviors during sessions  She is working with a new Psychiatrist and has continues to [employer] in therapy  P: Upcoming sessions will be used to continue to address the above  She will continue to attend both weekly Trauma and ED group therapy sessions  Pain Scale and Suicide Risk [facility]: Current Pain Assessment: moderate to severe   On a scale of 0 to 10, the patient rates current pain at 3   Behavioral Health Treatment Plan [facility]: Diagnosis and Treatment Plan explained to patient, patient relates understanding diagnosis and is agreeable to Treatment Plan  Assessment    1  Binge-eating disorder, severe (783 6) (F50 81)   2  Bipolar I disorder, most recent episode mixed, moderate (296 62) (F31 62)   3  EILEEN (generalized anxiety disorder) (300 02) (F41 1)   4   Obsessive-compulsive disorder (300 3) (F42 9)    Signatures   Electronically signed by : Ike Velazquez LCSW; May  5 2017  4:05PM EST                       (Author)

## 2018-01-12 NOTE — PSYCH
Psych Med Mgmt    Appearance: was calm and cooperative, adequate hygiene and grooming and good eye contact  Observed mood: depressed and anxious  Observed mood: affect was broad, but affect appropriate  Speech: a normal rate and fluent   Normal volume  Thought processes: coherent/organized   Obsessive  Intact associations  Hallucinations: no hallucinations present  Thought Content: no delusions  Abnormal Thoughts: The patient has no suicidal thoughts and no homicidal thoughts  Orientation: The patient is oriented to person, place and time  Recent and Remote Memory: short term memory intact and long term memory intact  Attention Span And Concentration: concentration intact  Judgment: Fair insight and judgement   Muscle Strength And Tone  Normal gait and station  Language: no difficulty naming common objects and no difficulty repeating a phrase  Fund of knowledge: Patient displays adequate knowledge of current events, adequate fund of knowledge regarding past history and adequate fund of knowledge regarding vocabulary  The patient is experiencing no localized pain  Treatment Recommendations: Pt is having moderate to severe anxiety, moderate mood and binging Sxs for which I will increase Sertraline  Pt accepts the plan  Increase Sertraline to 150mg total per day given as: 100mg + 50mg (1) tab po qd # 30 R1 each  Continue:  Lithium ER 450mg (2) tabs po qhs # 60 R3  Lamotrigine 200mg (2) tabs po qhs # 60 R3  Latuda 120mg (1) tab po q evening with food # 30 R3  Lorazepam 0 5mg (1) tab po tid prn anxiety # 90 R1  Hydroxyzine 50mg (1 1/2-2) tabs po qhs prn insomnia--Pt not needing more at present  Continue individual psychotherapy and group therapy  Return 2 months, call in 1 month to let me know she is doing  Risks, Benefits And Possible Side Effects Of Medications: Risks, benefits, and possible side effects of medications explained to patient and patient verbalizes understanding     She reports normal appetite, normal energy level and decrease in number of sleep hours   Efrain Narvaez is a 33y/o female here for medication review with primary c/o "My OCD has gone down, but I'm also very depressed so I don't know if that is the reason" in regards to increase in Sertraline increase  She cried 6 out of the past 10 days  Her mom has mental illness and can be explosive  Pt used to be able to handle her but now starts crying  Her sleep is not as good  However, she states her meal portions when she binges are getting a bit smaller but frequency is the same  She presently denies any purging, other compensatory behaviors  However, she then admitted she can purposely wait to eat if her BG is not at goal  It has to do with OCD but also to some degree, her weight  She believes if she does not clean her car she will get in an MVA or something else bad will happen, or if she does not wear lip gloss, she will have to vomit  She thinks that if she brushes her teeth she may choke and vomit (but is able to brush each day)  The thought is so strong it can make her gag  Pt presently denies SI, HI, panic attacks, or manic or psychotic Sxs  Pt denies PMD stopped Belviq due to overactive bladder Sxs, HA and nausea  Pt continues psychotherapy both individual and group counseling  I had reviewed Ms Honeycutt's notes from 7/25/2017 through 9/7/2017  Results/Data  (1) CBC/PLT/DIFF [id number] 07:23AM Geraldo Ballard     Test Name Result Flag Reference   WBC COUNT 12 10 Thousand/uL H 4 31-10 16   RBC COUNT 4 59 Million/uL  3 81-5 12   HEMOGLOBIN 12 2 g/dL  11 5-15 4   HEMATOCRIT 39 8 %  34 8-46  1   MCV 87 fL  82-98   MCH 26 6 pg L 26 8-34 3   MCHC 30 7 g/dL L 31 4-37 4   RDW 15 0 %  11 6-15 1   MPV 10 1 fL  8 9-12 7   PLATELET COUNT 805 Thousands/uL  149-390   nRBC AUTOMATED 0 /100 WBCs     NEUTROPHILS RELATIVE PERCENT 64 %  43-75   LYMPHOCYTES RELATIVE PERCENT 25 %  14-44   MONOCYTES RELATIVE PERCENT 6 %  4-12   EOSINOPHILS RELATIVE PERCENT 5 %  0-6   BASOPHILS RELATIVE PERCENT 0 %  0-1   NEUTROPHILS ABSOLUTE COUNT 7 71 Thousands/? ??L H 1 85-7 62   LYMPHOCYTES ABSOLUTE COUNT 3 03 Thousands/? ??L  0 60-4 47   MONOCYTES ABSOLUTE COUNT 0 74 Thousand/? ??L  0 17-1 22   EOSINOPHILS ABSOLUTE COUNT 0 54 Thousand/? ??L  0 00-0 61   BASOPHILS ABSOLUTE COUNT 0 04 Thousands/? ??L  0 00-0 10     (1) COMPREHENSIVE METABOLIC PANEL [id number] 09:24BD Charl Second     Test Name Result Flag Reference   SODIUM 139 mmol/L  136-145   POTASSIUM 4 0 mmol/L  3 5-5 3   CHLORIDE 107 mmol/L  100-108   CARBON DIOXIDE 26 mmol/L  21-32   ANION GAP (CALC) 6 mmol/L  4-13   BLOOD UREA NITROGEN 13 mg/dL  5-25   CREATININE 0 92 mg/dL  0 60-1 30   Standardized to IDMS reference method   CALCIUM 9 5 mg/dL  8 3-10 1   BILI, TOTAL 0 29 mg/dL  0 20-1 00   ALK PHOSPHATAS 78 U/L     ALT (SGPT) 23 U/L  12-78   AST(SGOT) 14 U/L  5-45   ALBUMIN 3 5 g/dL  3 5-5 0   TOTAL PROTEIN 7 0 g/dL  6 4-8 2   eGFR 83 ml/min/1 73sq m     National Kidney Disease Education Program recommendations are as follows:  GFR calculation is accurate only with a steady state creatinine  Chronic Kidney disease less than 60 ml/min/1 73 sq  meters  Kidney failure less than 15 ml/min/1 73 sq  meters  GLUCOSE FASTING 102 mg/dL H 65-99     (1) LIPID PANEL, FASTING [id number] 07:23AM Charl Second     Test Name Result Flag Reference   CHOLESTEROL 157 mg/dL     HDL,DIRECT 48 mg/dL  40-60   Specimen collection should occur prior to Metamizole administration due to the potential for falsely depressed results     LDL CHOLESTEROL CALCULATED 84 mg/dL  0-100   Triglyceride:         Normal              <150 mg/dl       Borderline High    150-199 mg/dl       High               200-499 mg/dl       Very High          >499 mg/dl  Cholesterol:         Desirable        <200 mg/dl      Borderline High  200-239 mg/dl      High             >239 mg/dl  HDL Cholesterol:        High    >59 mg/dL      Low     <41 mg/dL  LDL CALCULATED:    This screening LDL is a calculated result  It does not have the accuracy of the Direct Measured LDL in the monitoring of patients with hyperlipidemia and/or statin therapy  Direct Measure LDL (VPV623) must be ordered separately in these patients  TRIGLYCERIDES 123 mg/dL  <=150   Specimen collection should occur prior to N-Acetylcysteine or Metamizole administration due to the potential for falsely depressed results  (1) T3 TOTAL [id number] 07:23AM Lalo Grover     Test Name Result Flag Reference   T3 1 0 ng/mL  0 6-1 8     (1) T4, FREE [id number] 07:23AM Baileye Reilly     Test Name Result Flag Reference   T4,FREE 1 00 ng/dL  0 76-1 46     (1) TSH [id number] 07:23AM Lalo Grover     Test Name Result Flag Reference   TSH 3 500 uIU/mL  0 358-3 740   Patients undergoing fluorescein dye angiography may retain small amounts of fluorescein in the body for 48-72 hours post procedure  Samples containing fluorescein can produce falsely depressed TSH values  If the patient had this procedure,a specimen should be resubmitted post fluorescein clearance  The recommended reference ranges for TSH during pregnancy are as follows:  First trimester 0 1 to 2 5 uIU/mL  Second trimester  0 2 to 3 0 uIU/mL  Third trimester 0 3 to 3 0 uIU/m     (1) VITAMIN D 25-HYDROXY [id number] 07:23AM Lalo Grover     Test Name Result Flag Reference   VIT D 25-HYDROX 42 3 ng/mL  30 0-100 0   This assay is a certified procedure of the CDC Vitamin D Standardization Certification Program (VDSCP)     Deficiency <20ng/ml   Insufficiency 20-30ng/ml   Sufficient  ng/ml     *Patients undergoing fluorescein dye angiography may retain small amounts of fluorescein in the body for 48-72 hours post procedure  Samples containing fluorescein can produce falsely elevated Vitamin D values  If the patient had this procedure, a specimen should be resubmitted post fluorescein clearance       (1) LITHIUM [id number] 07: 88SK Zhen Jordan Order Number: [id number]     Test Name Result Flag Reference   LITHIUM 0 6 mmol/L  0 5-1 0     (1) INSULIN, FASTING [id number] 07:22AM Yanely Hurtado     Test Name Result Flag Reference   INSULIN, FASTING 22 5 mU/L  3 0-25 0       Vitals  Signs   Recorded: 11Sep2017 01:54PM   Heart Rate: 64  Systolic: 598, LUE, Sitting  Diastolic: 80, LUE, Sitting  Height: 5 ft 8 in  LMP: Approx [id number]    Assessment    1  Severe bipolar I disorder, most recent episode depressed (296 53) (F31 4)   2  EILEEN (generalized anxiety disorder) (300 02) (F41 1)   3  Binge-eating disorder, severe (783 6) (F50 81)   4  Obsessive-compulsive disorder (300 3) (F42 9)    Plan    1  Sertraline HCl - 50 MG Oral Tablet; Take 1 tab by mouth daily       Take with the 100mg tab    2  Sertraline HCl - 100 MG Oral Tablet (Zoloft); TAKE 1 TAB BY MOUTH DAILY      Take with the 50mg tablet    3  LORazepam 0 5 MG Oral Tablet; Take 1 tablet 3 times daily  as needed    4  LamoTRIgine 200 MG Oral Tablet; TAKE 2 TABLETS AT BEDTIME   5  Lithium Carbonate  MG Oral Tablet Extended Release; take 2 tablets at   bedtime    Review of Systems    Constitutional: No fever, no chills, feels well, no tiredness, no recent weight gain or loss  Cardiovascular: no complaints of slow or fast heart rate, no chest pain, no palpitations  Respiratory: no complaints of shortness of breath, no wheezing, no dyspnea on exertion  Gastrointestinal: no complaints of abdominal pain, no constipation, no nausea, no diarrhea, no vomiting  Genitourinary: no complaints of dysuria, no incontinence, no pelvic pain, no urinary frequency  Neurological: headache and sinus headache  Past Psychiatric History    Past Psychiatric History: Pt felt emotional neglect by mom who was raising Pt and her older brother as a single parent  Mom also had mental illness (MDD) and Pt (at 8y/o) witnessed her try to OD on pills   Mom also used to binge eat and eating has been a coping mechanism in the family  Pt first felt Sxs of a psychiatric nature at 14y/o --OCD Sxs of cleaning at first, then became obsessive over Wt loss and began dieting then restricting foods and exercising excessively, writing a list of goals/chores for the day--ie to clean, organize, "Line up something " Any new thing she needs to do or wants to get done gets added to the list  She then ruminates about what she has to get done for the day  Through time, the list has grown and when Sxs began, she got some satisfaction with completing them  In later years, even after she completes an item on the list, she only worries about what else she will need to do  She states "I know there's always more" to add to the list    She was "Slightly overweight" in teen years and was active (played basketball in school), but genetics caused a degree of Wt gain  Pt initially addressed her Wt issue by changed the kinds of foods she was eating, but within 1 month began to restrict meals daily  She started to exercise daily and was losing 20lbs per month at 22y/o  She first saw a psychologist at approx 13y/o because she thinks her mom thought she was depressed  Pt only went to that therapist for a few session and did not want to keep going  She first saw a psychiatrist at 14y/o and was formally diagnosed --but Pt does not recall the Dx  She knows she was given Prozac but stopped it at the advice of her mother who was worried about any extra things causing problems  Pt had other medical issues going on-- had developed pseudotumor cerebri around that time  The Prozac was stopped and she was not on another psychiatric medication until admission to [facility] in 2006  She was given Seroquel briefly at that time and an antidepressant--cannot recall which one      She thinks she may have been depressed in childhood but first became aware of Sxs of depression at 18y/o--felt ignored by her mother, she had lost contact with many school friends due to debilitation from pseudotumor cerebri which kept her home schooled for the last 2 years of high school  Depressive Sxs were years of daily sadness, feeling overwhelmed, hopelessness, worthlessness, anhedonia, self-isolating, hypersomnolence and SI  She self-mutilated (cutting her arms and stomach) [phone]  She started cutting due to concern about weight  She felt suicidal over her weight, but when she could not go through with suicide, she cut instead  She "Gambles" with lotto tickets in adulthood--was spending at least $500 per month 2014 but reduced it greatly since 11/2016  The desire to spend by gambling was reduced somewhat on it's own  However, the spending did not stop completely and she started buying clothing instead  Her anxiety was accompanied by worry, irritability, restlessness, lack of concentration, worse OCD Sxs in terms of doing more cleaning  and "Mildly" panic attacks  Panic Sxs were moderate anxiety, a little bit of fear, ruminating about things on her list, sometimes tachycardia  She has tried volunteer work but it was stressful trying to make it to her obligation on time  Pt has h/o multiple psychiatric admissions:  [facility], [id number] [facility] outpatient eating disorder program in 2006 and 2008 for anorexia nervosa--was restricting, but also over-exercising, sometimes binging in small amounts  Tried purging once (self-induced vomiting) in the past     [facility] 2008 and 2009    [facility] 2013    [facility] multiple times in the past to avoid repeat hospitalizations  During an admission to [facility] in 2008 she was given ECT  On discharge she was referred for psychotherapy with Trenton Fajardo at [facility] and was from there referred to outpatient psychiatry here as well     Pt states she was first seen by Dr Beto Deras in approx 2009 and was given a diagnosis of bipolar I disorder for the depressive Sxs above and irritable moods, rapid speech, distractibility--Pt unsure of other Sxs  No past paper chart available at this time  She was started on Lamotrigine and Fluoxetine was restarted  Doses of Fluoxetine above 20mg caused "Hyperactivity" and anxiety  Pt denies any h/o psychotic Sxs  One suicidal attempt --approx 2009 by OD on her psychiatric medications  Admitted to [facility]  On permanent disability since 2007 for mental illness    Pt tried: Bupropion SR, Buspirone, Citalopram, Effexor, Trazodone, Aripiprazole, Seroquel, Depakote, Topiramate, Clonazepam         Substance Abuse Hx    Substance Abuse History: Pt denies any h/o ETOH or illicit drug abuse    She has an alcoholic drink rarely, and when she does, it is only on special occasions/holidays  She tried THC once  Active Problems    1  Abnormal weight gain (783 1) (R63 5)   2  Allergic rhinitis (477 9) (J30 9)   3  Amenorrhea (626 0) (N91 2)   4  Binge-eating disorder, severe (783 6) (F50 81)   5  Bulging lumbar disc (722 10) (M51 26)   6  Dysuria (788 1) (R30 0)   7  Esophageal reflux (530 81) (K21 9)   8  Foot pain (729 5) (M79 673)   9  EILEEN (generalized anxiety disorder) (300 02) (F41 1)   10  Gambling disorder, moderate (312 31) (F63 0)   11  Hypertension (401 9) (I10)   12  Hypothyroidism (244 9) (E03 9)   13  Long term current use of therapeutic drug (V58 69) (Z79 899)   14  Lumbago (724 2) (M54 5)   15  Lumbar degenerative disc disease (722 52) (M51 36)   16  Morbid or severe obesity due to excess calories (278 01) (E66 01)   17  Nocturnal enuresis (788 36) (N39 44)   18  Obsessive-compulsive disorder (300 3) (F42 9)   19  Other abnormal glucose (790 29) (R73 09)   20  Overactive bladder (596 51) (N32 81)   21  RLS (restless legs syndrome) (333 94) (G25 81)   22  Severe bipolar I disorder, most recent episode depressed (296 53) (F31 4)   23   Urgency incontinence (788 31) (N39 41)   24  Urinary incontinence, unspecified type (788 30) (R32)   25  Urinary tract infection, site unspecified (599 0) (N39 0)   26  Vitamin D deficiency (268 9) (E55 9)    Past Medical History    1  History of Abscess of face (682 0) (L02 01)   2  History of Acute diverticulitis (562 11) (K57 92)   3  History of Acute frontal sinusitis (461 1) (J01 10)   4  History of Acute nonsuppurative otitis media, unspecified laterality (381 00) (H65 199)   5  History of Acute pain (338 19) (R52)   6  History of Anorexia nervosa in remission (307 1) (F50 00)   7  History of Backache (724 5) (M54 9)   8  History of Candidiasis, cutaneous (112 3) (B37 2)   9  History of Dog bite (879 8,E906 0) (W54  0XXA)   10  History of Dysfunction of left eustachian tube (381 81) (H69 82)   11  History of Dysuria (788 1) (R30 0)   12  History of acute bronchitis (V12 69) (Z87 09)   13  History of acute sinusitis (V12 69) (Z87 09)   14  History of bronchitis (V12 69) (Z87 09)   15  History of cough   16  Denied: History of head injury   17  History of Pseudotumor cerebri (348 2) (G93 2)   18  Denied: History of Seizures   19  History of Suicide and self-inflicted injury (I227 0) (F02 8WEE)    The active problems and past medical history were reviewed and updated today  Allergies    1  No Known Drug Allergies    2  Seasonal    Current Meds   1  Belviq 10 MG Oral Tablet; one tab BID; Therapy: 76RWB5390 to (Evaluate:14Nov2017); Last Rx:18Xuz4840 Ordered   2  HydrOXYzine HCl - 50 MG Oral Tablet; TAKE 1 1/2 TO 2 TABLETS AT BEDTIME AS   NEEDED FOR INSOMNIA; Therapy: 40XBI8443 to (Evaluate:34Rsm7690)  Requested for: 81UKM8370; Last   Rx:13Jun2017 Ordered   3  LamoTRIgine 200 MG Oral Tablet; TAKE 2 TABLETS AT BEDTIME; Therapy: 34ZRR3633 to (Evaluate:10Oct2017)  Requested for: 12Jun2017; Last   Rx:12Jun2017 Ordered   4  Latuda 120 MG Oral Tablet; TAKE 1 TABLET IN THE EVENING WITH FOOD;    Therapy: 21Apr2016 to (Evaluate:11Oct2017)  Requested for: 40EQU4025; Last   Rx:11Xlg7189 Ordered   5  Levocetirizine Dihydrochloride 5 MG Oral Tablet; TAKE 1 TABLET BY MOUTH ONCE A   DAY; Therapy: 97Uqy9292 to (Evaluate:84Ahi0016)  Requested for: 17IAZ0112; Last   Rx:89Apf0797 Ordered   6  Levothyroxine Sodium 100 MCG Oral Tablet; take 1 tablet by mouth every day; Therapy: 12CAL5282 to (Meli Villanueva)  Requested for: 28Add4889; Last   Rx:31Gmb9546 Ordered   7  Lisinopril 10 MG Oral Tablet; TAKE 1 TABLET BY MOUTH EVERY DAY FOR BLOOD   PRESSURE; Therapy: 70FXO4240 to (486 8704)  Requested for: 46Axv9167; Last   Rx:54Iwu9491 Ordered   8  Lithium Carbonate  MG Oral Tablet Extended Release; take 2 tablets at bedtime; Therapy: 24KYL4500 to (Evaluate:63Fqc6124)  Requested for: 42Bmn8847; Last   Rx:04Rrr2679 Ordered   9  LORazepam 0 5 MG Oral Tablet; Take 1 tablet 3 times daily  as needed; Therapy: 32Sfg1567 to (Evaluate:11Gui2203); Last Rx:43Ovj7240 Ordered   10  Myrbetriq 50 MG Oral Tablet Extended Release 24 Hour; Take one tablet daily; Therapy: 96GCP1975 to (Last Rx:82Qqk7728)  Requested for: 97Ras7388 Ordered   11  Neurontin 300 MG Oral Capsule; take 1 capsule daily; Therapy: (Recorded:19Pox7948) to Recorded   12  Omeprazole 20 MG Oral Capsule Delayed Release; take 1 capsule daily as needed; Therapy: 35OSH2272 to ([phone])  Requested for: 32Mcw1213; Last    Rx:22Ylw1959 Ordered   13  ROPINIRole HCl - 3 MG Oral Tablet; Take 1 tab BID; Therapy: 47Zfm4171 to (Last Rx:89Ngr6450)  Requested for: 02Qju8907 Ordered   14  Sertraline HCl - 100 MG Oral Tablet; TAKE 1 TAB BY MOUTH DAILY; Therapy: 60Ock6379 to (Evaluate:17Oct2017)  Requested for: 05INO1778; Last    Rx:98Myt7721 Ordered   15  Sertraline HCl - 25 MG Oral Tablet; TAKE 1 TAB BY MOUTH DAILY        Take with the 100mg tablet;     Therapy: 37VGI8919 to (Evaluate:20Uie9082)  Requested for: 30Liw4249; Last    Rx:28Ibi3869 Ordered    The medication list was reviewed and updated today  Family Psych History  Mother    1  Family history of bicuspid aortic valve (V17 49) (Z82 79)   2  Family history of depression (V17 0) (Z81 8)   3  Family history of Hypertension (V17 49)   4  Family history of Status post aortic valve replacement  Father    5  Family history of Hypertension (V17 49)   6  Family history of Hypothyroidism   7  No family history of mental disorder  Brother    8  Family history of Hypertension (V17 49)  Maternal Grandmother    9  Family history of Cancer   10  Family history of cerebrovascular accident (V17 1) (Z82 3)   11  Family history of Heart Disease (V17 49)  Paternal Grandmother    15  Family history of Cancer   13  Family history of malignant neoplasm (V16 9) (Z80 9)  Maternal Grandfather    15  Family history of pneumonia (V18 8) (Z83 1)  Paternal Grandfather    13  Family history of pneumonia (V18 8) (Z83 1)  Family History    12  Family history of Arthritis (716 90) (M19 90)   17  Family history of Breast Cancer (V16 3)   18  Family history of osteoporosis (V17 81) (Z82 62)   19  Family history of Hypertension (V17 49)   20  Family history of Transient Ischemic Attack    The family history was reviewed and updated today  Social History    · Caffeine Use   · Currently sexually active   · Disabled   · Former smoker (F32 36) (N05 287)   · Has no children   · High school graduate   · Lives with parents   · Never smoked   · No alcohol use   · Single   · Tea  The social history was reviewed and updated today  The social history was reviewed and is unchanged  No changes as of 9/11/2017      History Of Phys/Sex Abuse Or Perpetration    History Of Phys/Sex Abuse or Perpetration: Pt denies any h/o physical or sexual abuse but felt emotionally neglected due to her mom's mental illness (MDD)  Her maternal grandparents helped take care of Pt when her mom was in the hospital for psychiatric admissions    Parents were  when Pt was 1y/o so was always available  Education        Pt denies any h/o learning disabilities and reached childhood milestones on time as far as she knows  Graduated high school 2002  Completed some college     End of Encounter Meds    1  Levocetirizine Dihydrochloride 5 MG Oral Tablet; TAKE 1 TABLET BY MOUTH ONCE A   DAY; Therapy: 33Rrl6508 to (Evaluate:74Ove5737)  Requested for: 20YMU8380; Last   Rx:46Nss9663 Ordered    2  Sertraline HCl - 50 MG Oral Tablet; Take 1 tab by mouth daily       Take with the 100mg tab; Therapy: 24CLA0693 to (Evaluate:10Nov2017)  Requested for: 52Kfj8716; Last   Rx:92Hds6820 Ordered    3  Sertraline HCl - 100 MG Oral Tablet (Zoloft); TAKE 1 TAB BY MOUTH DAILY      Take with the 50mg tablet; Therapy: 09Qzn1663 to (Evaluate:10Nov2017)  Requested for: 99Nyk3633; Last   Rx:72Zsp4597 Ordered    4  Omeprazole 20 MG Oral Capsule Delayed Release; take 1 capsule daily as needed; Therapy: 57KLS1867 to (Donzetta Dubin)  Requested for: 34Vtq9709; Last   Rx:66Eoo0142 Ordered    5  HydrOXYzine HCl - 50 MG Oral Tablet; TAKE 1 1/2 TO 2 TABLETS AT BEDTIME AS   NEEDED FOR INSOMNIA; Therapy: 53AVE9632 to (Evaluate:48Lbw8637)  Requested for: 73NFF7330; Last   Rx:13Jun2017 Ordered   6  LORazepam 0 5 MG Oral Tablet; Take 1 tablet 3 times daily  as needed; Therapy: 07Zff2384 to (Evaluate:10Nov2017); Last Rx:38Tvy5609 Ordered    7  Lisinopril 10 MG Oral Tablet; TAKE 1 TABLET BY MOUTH EVERY DAY FOR BLOOD   PRESSURE; Therapy: 28UHH7888 to (486 8704)  Requested for: 89Eqv7839; Last   Rx:52Izn8153 Ordered    8  Levothyroxine Sodium 100 MCG Oral Tablet (Synthroid); take 1 tablet by mouth every day; Therapy: 06SIR0483 to (True Castro)  Requested for: 97Qje9320; Last   Rx:89Hpw5527 Ordered    9  Belviq 10 MG Oral Tablet; one tab BID; Therapy: 91WIV7602 to (Evaluate:14Nov2017); Last Rx:85Kot2811 Ordered    10  ROPINIRole HCl - 3 MG Oral Tablet;  Take 1 tab BID; Therapy: 91Gyo6439 to (Last Rx:46Zmx1479)  Requested for: 79Vfv1315 Ordered    11  LamoTRIgine 200 MG Oral Tablet; TAKE 2 TABLETS AT BEDTIME; Therapy: 32DWI2396 to (22 191454)  Requested for: 33Ybe0895; Last    Rx:64Nfo2598 Ordered   12  Latuda 120 MG Oral Tablet; TAKE 1 TABLET IN THE EVENING WITH FOOD; Therapy: 94Tcl2717 to (Evaluate:11Oct2017)  Requested for: 31NTH0445; Last    Rx:36Yil2443 Ordered   13  Lithium Carbonate  MG Oral Tablet Extended Release; take 2 tablets at bedtime; Therapy: 82HIX6134 to (Evaluate:09Jan2018)  Requested for: 37Vlj7265; Last    Rx:34Cvo4565 Ordered    14  Myrbetriq 50 MG Oral Tablet Extended Release 24 Hour; Take one tablet daily; Therapy: 63GWJ1147 to (Last Rx:50Vsi9999)  Requested for: 27Tnu0255 Ordered    15  Neurontin 300 MG Oral Capsule (Gabapentin); take 1 capsule daily;     Therapy: (Recorded:[id number]) to Recorded    Future Appointments    Date/Time Provider Specialty Site   11/06/2017 03:30 PM SCOTTIE Crisostomo Psychiatry [facility]   09/19/2017 03:00 PM Nurse, Urology Diagnostic  [facility] [address]   10/17/2017 03:00 PM Nurse, Urology Diagnostic  [facility] [address]   11/14/2017 03:00 PM Nurse, Urology Diagnostic  [facility] [address]   12/12/2017 03:00 PM Nurse, Urology Diagnostic  [facility] [place]   09/12/2017 05:00 PM Bárbara Pineda [place]   09/14/2017 09:00 AM Bárbara Pineda [address] [facility]   09/22/2017 10:00 AM Bárbara Pineda [address] [facility]   09/26/2017 05:00 PM Ivonne Pierce [facility]   09/28/2017 08:00 AM B[address]bara Pineda [address] [facility]   10/03/2017 05:00 PM Bárbara Pineda [address] [facility]   10/05/2017 11:00 AM Ivonne Pierce [facility] TH[facility]   10/10/2017 05:00 PM Bárbara Pineda [facility] THERAP[facility]   10/12/2017 10:00 AM Bárbara Pineda, [address] [facility] THERAPISTS   10/17/2017 05:00 PM B[facility]bara Pineda, [facility]   10/19/2017 01:00 PM Bárbara Pineda, [address] [facility]   10/24/2017 05:00 PM [place], [address] [facility]   10/26/2017 11:00 AM Bárbara Pineda, [address] [facility]   10/31/2017 05:00 PM Bárbara Pineda, [facility]   11/02/2017 01:00 PM Bárbara Pineda, [address] [facility]   11/07/2017 05:00 PM Bárbara Pineda, [address] [facility]   11/14/2017 05:00 PM Bárbara Pinedas, [facility]   11/21/2017 05:00 PM Bárbara Pinedas, [facility]   11/28/2017 05:00 PM Bárbara Pinedas, [facility]   12/05/2017 05:00 PM Bárbara Pinedas, [facility]   12/12/2017 05:00 PM Bárbara Pinedas, [facility]   12/19/2017 05:00 PM Bárbara Pineda, [facility]   10/30/2017 08:30 AM Nickie Mccarthy, [address] [place] [address]     Signatures   Electronically signed by : SCOTTIE Nava; Sep 11 2017  2:17PM EST                       (Author)    Electronically signed by : PAUL Copeland ; Sep 11 2017  2:52PM EST                       (Acknowledgement)

## 2018-01-12 NOTE — PSYCH
Progress Note  Psychotherapy Provided [facility]: Individual Psychotherapy 50 minutes provided today  Goals addressed in session:   1-3 D: Met with Elizabeth for Individual Therapy session  Elizabeth spoke today about her feelings re: her OCD  She verbalized that she "does not believe she has Bipolar D/O" and this was processed at length  A: Elizabeth presented as more morose and depressed today  She has been reluctant to sleep, to take anti-anxiety medications lately and has been more despondent  She denies suicidal intent  Her dr will be made aware of her struggles  P: Upcoming sessions will be used to continue to address the above  She will continue to attend both weekly Trauma and ED group therapy sessions  Pain Scale and Suicide Risk [facility]: Current Pain Assessment: moderate to severe   On a scale of 0 to 10, the patient rates current pain at 5   Current suicide risk is moderate (see risk assessment checklist)   Behavioral Health Treatment Plan [facility]: Diagnosis and Treatment Plan explained to patient, patient relates understanding diagnosis and is agreeable to Treatment Plan  Assessment    1  Binge-eating disorder, severe (783 6) (F50 81)   2  Bipolar 1 disorder, depressed, moderate (296 52) (F31 32)   3  EILEEN (generalized anxiety disorder) (300 02) (F41 1)   4   Obsessive-compulsive disorder (300 3) (F42 9)    Signatures   Electronically signed by : Lynn Mariscal LCSW; Jul 13 2017  2:09PM EST                       (Author)

## 2018-01-12 NOTE — PSYCH
Progress Note  Psychotherapy Provided [facility]: Individual Psychotherapy 50 minutes provided today  Goals addressed in session:   1-3 D: Met with pt for Individual Therapy session  Elizabeth spoke today about her feelings re: not feeling able to make it out of her OCD routines to volunteer this week  Struggles with her family were also processed  A: Sophie Estrada continues to be open and insightful as she works on her desire to develop a stronger sense of self and to challenge her beliefs about her family relationships  P: Upcoming sessions will be used to continue to address the above  She will continue to attend both weekly Trauma and ED group therapy sessions  Pain Scale and Suicide Risk [facility]: Current Pain Assessment: moderate to severe   On a scale of 0 to 10, the patient rates current pain at 6   Behavioral Health Treatment Plan Steve Brady: Diagnosis and Treatment Plan explained to patient, patient relates understanding diagnosis and is agreeable to Treatment Plan  Assessment    1  Bipolar I disorder, most recent episode depressed, in full remission (296 56) (F31 76)   2  Binge-eating disorder, severe (783 6) (F50 81)   3  Gambling disorder, moderate (312 31) (F63 0)   4   Obsessive-compulsive disorder (300 3) (F42 9)    Signatures   Electronically signed by : Cathy Roca LCSW; Dec  8 2016 10:01AM EST                       (Author)

## 2018-01-12 NOTE — PSYCH
Progress Note  Psychotherapy Provided [facility]: Family Therapy provided today  Goals addressed in session:   1: Pt was seen with her mother today for [facility] Family Therapy session  Pt's mother spoke about her frustration with her daughter for using her credit card  She admitted she has given her permission to do so, but is frustrated with some of her choices, purchases  Elizabeth expressed frustration in return with her mother and asked her to leave the session  A: Elizabeth was tearful and more depressed during today's session  She is upset with not only her mother, but also with herself  She acts out towards her mother, but also does not believe that she has control over all of her behaviors as her thought processes feel very out-of-control  P:Elizabeth will continue to attend Individual and Group therapy on a weekly basis  Her dr will be updated about the above as she is scheduled to see her later this week  Pain Scale and Suicide Risk [facility]: Current Pain Assessment: moderate to severe   On a scale of 0 to 10, the patient rates current pain at 3   Behavioral Health Treatment Plan Mario Monreal: Diagnosis and Treatment Plan explained to patient, patient relates understanding diagnosis and is agreeable to Treatment Plan  Assessment    1  Bipolar I disorder, most recent episode depressed, moderate (296 52) (F31 32)   2  Eating disorder, unspecified (307 50) (F50 9)   3   Obsessive-compulsive disorder (300 3) (F42)    Signatures   Electronically signed by : Maxim Newton LCSW; Apr 19 2016  1:54PM EST                       (Author)

## 2018-01-12 NOTE — PSYCH
Progress Note  Psychotherapy Provided [facility]: Individual Psychotherapy 50 minutes provided today  Goals addressed in session:   1-3 D: Met with Elizabeth for Individual Therapy session  Elizabeth spoke today about her feelings re: her improved health since discontinuing the [place]viq as a result of the side effects she had been experiencing  She spoke about the increased conflicts at home with her mother and her improved management of her OCD symptoms today  A: Nicole Oden was much more stable today as she continues to work diligently on her issues  She is extremely invested in understanding herself better and recovering from her childhood trauma history  P: Upcoming sessions will be used to continue to address the above  She will continue to attend both weekly Trauma and ED group therapy sessions  Pain Scale and Suicide Risk [facility]: Current Pain Assessment: moderate to severe   On a scale of 0 to 10, the patient rates current pain at 5   Behavioral Health Treatment Plan [facility]: Diagnosis and Treatment Plan explained to patient, patient relates understanding diagnosis and is agreeable to Treatment Plan  Assessment    1  Binge-eating disorder, severe (783 6) (F50 81)   2  EILEEN (generalized anxiety disorder) (300 02) (F41 1)   3  Obsessive-compulsive disorder (300 3) (F42 9)   4   Severe bipolar I disorder, most recent episode depressed (296 53) (F31 4)    Signatures   Electronically signed by : Atul Leary LCSW; Sep  7 2017 12:40PM EST                       (Author)

## 2018-01-12 NOTE — PSYCH
Progress Note  Psychotherapy Provided [facility]: Group Therapy provided today  Goals addressed in session:   1 D: PT  was seen for Group Therapy session  Pts engaged in discussion and activity focused on the year ahead  All group members chose a word that they would use to direct their recovery efforts  A: Elizabeth shared her word and reasons for choosing this word with the group  She also shared her feelings re: the conflicts she has been having at home  P: Pt will continue to attend individual therapy weekly and the Adult ED group on a biweekly basis  Pain Scale and Suicide Risk [facility]: On a scale of 0 to 10, the patient rates current pain at 5   Behavioral Health Treatment Plan [facility]: Diagnosis and Treatment Plan explained to patient, patient relates understanding diagnosis and is agreeable to Treatment Plan  Assessment    1   Binge-eating disorder, severe (463 6) (Y27 30)    Signatures   Electronically signed by : Atul Leary LCSW; Dec  7 2016 10:14AM EST                       (Author)

## 2018-01-12 NOTE — PSYCH
Progress Note  Psychotherapy Provided [facility]: Individual Psychotherapy 50 minutes provided today  Goals addressed in session:   1-3 D: Met with Elizabeth for Individual Therapy session  Elizabeth spoke today about her feelings re: her dog's impending oral surgery and the connection they have built  She became upset as the session progressed and became angry at herself for becoming tearful  A: Elizabeth was able to work through some of these painful feelings with this worker as she attempts vulnerability with painful emotions  P: Upcoming sessions will be used to continue to address the above  She will continue to attend both weekly Trauma and ED group therapy sessions  Pain Scale and Suicide Risk [facility]: Current Pain Assessment: moderate to severe   On a scale of 0 to 10, the patient rates current pain at 5   Current suicide risk is low   Behavioral Health Treatment Plan [employer]: Diagnosis and Treatment Plan explained to patient, patient relates understanding diagnosis and is agreeable to Treatment Plan  Assessment    1  EILEEN (generalized anxiety disorder) (300 02) (F41 1)   2  Obsessive-compulsive disorder (300 3) (F42 9)   3  Severe bipolar I disorder, most recent episode depressed (296 53) (F31 4)   4   Binge-eating disorder, severe (783 6) (F50 81)    Signatures   Electronically signed by : Lucy Zaman LCSW; Aug 24 2017  2:31PM EST                       (Author)

## 2018-01-12 NOTE — PSYCH
Progress Note  Psychotherapy Provided [facility]: Individual Psychotherapy 50 minutes and Telephone Contact provided today  Goals addressed in session:   1: Pt was seen today for Individual Therapy session  Pt spoke about her feelings re: having invited her father to attend her next session  She also shared feelings re: discussion during last ED group  A: Elizabeth was not depressed during today's session  She admitted that she is concerned how it will be for her father to attend her session and this was processed at length  P:Elizabeth will continue to attend Individual and Group therapy on a weekly basis  Pain Scale and Suicide Risk [facility]: Current Pain Assessment: moderate to severe   On a scale of 0 to 10, the patient rates current pain at 6   Behavioral Health Treatment Plan [address] [place] [address] [address]: Diagnosis and Treatment Plan explained to patient, patient relates understanding diagnosis and is agreeable to Treatment Plan  Assessment    1  Eating disorder, unspecified (307 50) (F50 9)   2  EILEEN (generalized anxiety disorder) (300 02) (F41 1)   3  Gambling disorder, moderate (312 31) (F63 0)   4   Obsessive-compulsive disorder (300 3) (F42)    Signatures   Electronically signed by : Kosta Barahona LCSW; Feb 18 2016  3:59PM EST                       (Author)

## 2018-01-12 NOTE — DISCHARGE INSTR - LAB
Contact Information: If you have any questions, concerns, pended studies, tests and/or procedures, or emergencies regarding your inpatient behavioral health visit  Please contact [facility] [phone] and ask to speak to a , nurse or physician  A contact is available 24 hours/ 7 days a week at this number  Summary of Procedures Performed During your Stay:  Below is a list of major procedures performed during your hospital stay and a summary of results:  - No major procedures performed  Pending Studies     Start     Ordered    01/11/18 0000  CBC and Platelet      43/91/47 1130        If studies are pending at discharge, follow up with your PCP and/or referring provider

## 2018-01-12 NOTE — MISCELLANEOUS
Message   Recorded as Task   Date: 07/27/2017 11:03 AM, Created By: Ann-Marie Milton   Task Name: Medical Complaint Callback   Assigned To: Adan Sales   Regarding Patient: Carlota Varela, Status: Active   Comment:    Claudine Honeycutt - 27 Jul 2017 11:03 AM     TASK CREATED  Hi, Elizabeth really wants to stop ALL meds and start over  (new meds)  I explained this is NOT ok, but she feels very hopeless as she is on "top dose" (her words) and "nothing helps " I told her I would discuss this with you  I also suggested that this could possibly be monitored in PHP  (however, she does not have her own car, and lives VERY far away    I attempted to reach [place] and left ms that Trudy Hicks had tasked me regarding her mood and frustrations regarding her medications  I left msg for her NOT to stop her medicines cold turkey due to risk of potentially dangerous withdrawal effects and for her to call me back   Also stated that if she could not connect with me before tomorrow, to make a more urgent appt/ "Same day" appt--available on 8/23/2017 at 3:30PM       Signatures   Electronically signed by : SCOTTIE Bravo; Jul 27 2017  2:50PM EST                       (Author)

## 2018-01-12 NOTE — PSYCH
Progress Note  Psychotherapy Provided [facility]: Family Therapy provided today  Goals addressed in session:   1-3 D: Met with Elizabeth and her mother for a Family Therapy session  Elizabeth began session by stating concerns about the way her mother responds to situations in the home  Mom responded with explanations and these were processed  A: Elizabeth was once again relieved for this worker to witness her mother's emotional explosion as it validated what she experiences in the home when she attempts to express herself, her feelings and her needs  Her mother was able to reespond to this worker's feedback and both parties agreed to work harder  P: Upcoming sessions will be used to continue to address the above  Pain Scale and Suicide Risk [facility]: Current Pain Assessment: moderate to severe   On a scale of 0 to 10, the patient rates current pain at 4   Behavioral Health Treatment Plan [address] [place] [address] [address]: Diagnosis and Treatment Plan explained to patient, patient relates understanding diagnosis and is agreeable to Treatment Plan  Assessment    1  EILEEN (generalized anxiety disorder) (300 02) (F41 1)   2  Gambling disorder, moderate (312 31) (F63 0)   3  Obsessive-compulsive disorder (300 3) (F42 9)   4  Binge-eating disorder, severe (783 6) (F50 81)   5  PTSD (post-traumatic stress disorder) (309 81) (F43 10)   6   Severe bipolar I disorder, most recent episode depressed (296 53) (F31 4)    Signatures   Electronically signed by : Jeet Lay LCSW; Oct 26 2017 12:55PM EST                       (Author)

## 2018-01-12 NOTE — PSYCH
Psych Med Mgmt    Appearance: was calm and cooperative, adequate hygiene and grooming and good eye contact  Observed mood: depressed and anxious  Observed mood: affect was broad, but affect appropriate  Speech: a normal rate and fluent   Normal volume  Thought processes: coherent/organized   Intact associations  Obsessive thinking  Hallucinations: no hallucinations present  Thought Content: no delusions  Abnormal Thoughts: The patient has no suicidal thoughts and no homicidal thoughts  Orientation: The patient is oriented to person, place and time  Recent and Remote Memory: short term memory intact and long term memory intact  Attention Span And Concentration: concentration intact  Judgment: Fair insight and judgement   Muscle Strength And Tone  Normal gait and station  Language: no difficulty naming common objects  Fund of knowledge: Patient displays adequate knowledge of current events, adequate fund of knowledge regarding past history and adequate fund of knowledge regarding vocabulary  The patient is experiencing no localized pain  Treatment Recommendations: Pt is having moderate to severe anxiety and moderate binging for which I will increase Sertraline  I discussed waiting to start Belviq until 2 week f/u visits can be arranged between myself and her PMD office  I reviewed doctor Jeronimo's notes on rationale for increasing medications and I will keep all mood stabilizers the same at this time  Pt accepts the plan    Increase Sertraline to 100mg (1) tab po qd # 30 R1  Continue: Lorazepam 0 5mg(1) tab po tid prn anxiety #  Continue the following of which she has refills from prior Rxs;  Latuda 120mg (1) tab po q evening with food   Lamotrigine 200mg (2) tabs po qhs # 60   Lithium Carb ER 450mg (2 1/2) tabs po qhs   Continue Vit D (OTC)   Hydroxyzine 50mg (1 1/2 - 2) tabs po qhs prn insomnia  Continue psychotherapy  Return 4-6 weeks, (My availability is limited over the next month)  Risks, Benefits And Possible Side Effects Of Medications: Risks, benefits, and possible side effects of medications explained to patient and patient verbalizes understanding  Discussed with patient the risks of sedation, respiratory depression, impairment of ability to drive and potential for abuse and addiction related to treatment with benzodiazepine medications  The patient understands risk of treatment with benzodiazepine medications, agrees to not drive if feels impaired and agrees to take medications as prescribed  The patient has been filling controlled prescriptions on time as prescribed to [facility]     She reports normal appetite, normal energy level, no weight change and decrease in number of sleep hours   Bailey Catherine is a 32y/o female here for medication review with primary c/o "I think the thing that getting me down is the OCD " Her binging reduced "A lot" --over the past 2 weeks she binged 1-2x  She presently denies restricting or purging or other compensatory behaviors  She has anxiety and obsessive thoughts regarding cleaning and organizing, and buys things in a "Compulsive" way because she keeps thinking she needs something and cannot let the thought go until she buys it  The anxiety is interfering with sleep  She did not notice any improvement with the increase in Sertraline  She is depressed with sad moods, anhedonia, and feelings of worthlessness and helplessness  Pt presently denies SI, HI, panic attacks, or manic or psychotic Sxs  Pt does not feel she is bipolar but is unsure if her medicines are helping except for that Archie Sinks made a positive difference  Pt continues individual and trauma and ED group therapies with Ms Pineda (whose 6/13/2017 through 7/20/2017 notes I reviewed) and had a family session with her mother   Pt states her therapist was discussing weight loss medications with her and had contacted PMD office about Belviq  Pt's PMD office started Belviq on 7/12/2017 but Pt has no f/u with PMD office until 10/30/2017  I discussed my conversation with MS  Suyapa Tariq --who is a PA-C, on 7/10/2017 where between my office and PMD office, she should be followed q 2 weeks for the initial months starting Belviq due to it's SE potential of increased anxiety and depression  Pt states she does not yet have the Rx  Vitals  Signs   Recorded: 00Vss1086 03:35PM   Heart Rate: 60  Systolic: 277, LUE, Sitting  Diastolic: 74, LUE, Sitting  Height: 5 ft 8 in    Assessment    1  Obsessive-compulsive disorder (300 3) (F42 9)   2  EILEEN (generalized anxiety disorder) (300 02) (F41 1)   3  Binge-eating disorder, severe (783 6) (F50 81)   4  Bipolar 1 disorder, depressed, moderate (296 52) (F31 32)   5  Vitamin D deficiency (268 9) (E55 9)    Plan    1  Sertraline HCl - 100 MG Oral Tablet (Zoloft); TAKE 1 TAB BY MOUTH DAILY    2  LORazepam 0 5 MG Oral Tablet; Take 1 tablet 3 times daily  as needed    Review of Systems    Constitutional: No fever, no chills, feels well, no tiredness, no recent weight gain or loss  Cardiovascular: no complaints of slow or fast heart rate, no chest pain, no palpitations  Respiratory: no complaints of shortness of breath, no wheezing, no dyspnea on exertion  Gastrointestinal: no complaints of abdominal pain, no constipation, no nausea, no diarrhea, no vomiting  Genitourinary: no complaints of dysuria, no incontinence, no pelvic pain, no urinary frequency  Musculoskeletal: no complaints of arthralgia, no myalgias, no limb pain, no joint stiffness  Integumentary: no complaints of skin rash, no itching, no dry skin  Neurological: no complaints of headache, no confusion, no numbness, no dizziness  Past Psychiatric History    Past Psychiatric History: Pt felt emotional neglect by mom who was raising Pt and her older brother as a single parent   Mom also had mental illness (MDD) and Pt (at 6y/o) witnessed her try to OD on pills  Mom also used to binge eat and eating has been a coping mechanism in the family  Pt first felt Sxs of a psychiatric nature at 16y/o --OCD Sxs of cleaning at first, then became obsessive over Wt loss and began dieting then restricting foods and exercising excessively, writing a list of goals/chores for the day--ie to clean, organize, "Line up something " Any new thing she needs to do or wants to get done gets added to the list  She then ruminates about what she has to get done for the day  Through time, the list has grown and when Sxs began, she got some satisfaction with completing them  In later years, even after she completes an item on the list, she only worries about what else she will need to do  She states "I know there's always more" to add to the list    She was "Slightly overweight" in teen years and was active (played basketball in school), but genetics caused a degree of Wt gain  Pt initially addressed her Wt issue by changed the kinds of foods she was eating, but within 1 month began to restrict meals daily  She started to exercise daily and was losing 20lbs per month at 20y/o  She first saw a psychologist at approx 15y/o because she thinks her mom thought she was depressed  Pt only went to that therapist for a few session and did not want to keep going  She first saw a psychiatrist at 16y/o and was formally diagnosed --but Pt does not recall the Dx  She knows she was given Prozac but stopped it at the advice of her mother who was worried about any extra things causing problems  Pt had other medical issues going on-- had developed pseudotumor cerebri around that time  The Prozac was stopped and she was not on another psychiatric medication until admission to [facility] in 2006  She was given Seroquel briefly at that time and an antidepressant--cannot recall which one      She thinks she may have been depressed in childhood but first became aware of Sxs of depression at 18y/o--felt ignored by her mother, she had lost contact with many school friends due to debilitation from pseudotumor cerebri which kept her home schooled for the last 2 years of high school  Depressive Sxs were years of daily sadness, feeling overwhelmed, hopelessness, worthlessness, anhedonia, self-isolating, hypersomnolence and SI  She self-mutilated (cutting her arms and stomach) [phone]  She started cutting due to concern about weight  She felt suicidal over her weight, but when she could not go through with suicide, she cut instead  She "Gambles" with [employer] tickets in adulthood--was spending at least $500 per month 2014 but reduced it greatly since 11/2016  The desire to spend by gambling was reduced somewhat on it's own  However, the spending did not stop completely and she started buying clothing instead  Her anxiety was accompanied by worry, irritability, restlessness, lack of concentration, worse OCD Sxs in terms of doing more cleaning  and "Mildly" panic attacks  Panic Sxs were moderate anxiety, a little bit of fear, ruminating about things on her list, sometimes tachycardia  She has tried volunteer work but it was stressful trying to make it to her obligation on time  Pt has h/o multiple psychiatric admissions:  [facility] [place] [address] [facility] outpatient eating disorder program in 2006 and 2008 for anorexia nervosa--was restricting, but also over-exercising, sometimes binging in small amounts  Tried purging once (self-induced vomiting) in the past     [facility] 2008 and 2009    [facility] 2013    [facility] multiple times in the past to avoid repeat hospitalizations  During an admission to [facility] in 2008 she was given ECT  On discharge she was referred for psychotherapy with Trenton Fajardo at [place] [address] and was from there referred to outpatient psychiatry here as well     Pt states she was first seen by Dr Cherie Helms in approx 2009 and was given a diagnosis of bipolar I disorder for the depressive Sxs above and irritable moods, rapid speech, distractibility--Pt unsure of other Sxs  No past paper chart available at this time  She was started on Lamotrigine and Fluoxetine was restarted  Doses of Fluoxetine above 20mg caused "Hyperactivity" and anxiety  Pt denies any h/o psychotic Sxs  One suicidal attempt --approx 2009 by OD on her psychiatric medications  Admitted to [facility]  On permanent disability since 2007 for mental illness    Pt tried: Bupropion SR, Buspirone, Citalopram, Effexor, Trazodone, Aripiprazole, Seroquel, Depakote, Topiramate, Clonazepam         Substance Abuse Hx    Substance Abuse History: Pt denies any h/o ETOH or illicit drug abuse    She has an alcoholic drink rarely, and when she does, it is only on special occasions/holidays  She tried THC once  Active Problems     1  Abnormal weight gain (783 1) (R63 5)   2  Acute pain (338 19) (R52)   3  Allergic rhinitis (477 9) (J30 9)   4  Amenorrhea (626 0) (N91 2)   5  Binge-eating disorder, severe (783 6) (F50 81)   6  Bipolar 1 disorder, depressed, moderate (296 52) (F31 32)   7  Bulging lumbar disc (722 10) (M51 26)   8  Dysuria (788 1) (R30 0)   9  Esophageal reflux (530 81) (K21 9)   10  Foot pain (729 5) (M79 673)   11  EILEEN (generalized anxiety disorder) (300 02) (F41 1)   12  Gambling disorder, moderate (312 31) (F63 0)   13  Hypertension (401 9) (I10)   14  Hypothyroidism (244 9) (E03 9)   15  Long term current use of therapeutic drug (V58 69) (Z79 899)   16  Lumbago (724 2) (M54 5)   17  Lumbar degenerative disc disease (722 52) (M51 36)   18  Morbid or severe obesity due to excess calories (278 01) (E66 01)   19  Obsessive-compulsive disorder (300 3) (F42 9)   20  Other abnormal glucose (790 29) (R73 09)   21  RLS (restless legs syndrome) (283 42) (V74 39)   22   Urinary incontinence, unspecified type (788 30) (R32)   23  Vitamin D deficiency (268 9) (E55 9)    Overactive bladder (596 51) (N32 81)       Nocturnal enuresis (788 36) (N39 44)       Urgency incontinence (788 31) (N39 41)          Past Medical History    1  History of Abscess of face (682 0) (L02 01)   2  History of Acute diverticulitis (562 11) (K57 92)   3  History of Acute frontal sinusitis (461 1) (J01 10)   4  History of Acute nonsuppurative otitis media, unspecified laterality (381 00) (H65 199)   5  History of Anorexia nervosa in remission (307 1) (F50 00)   6  History of Backache (724 5) (M54 9)   7  History of Candidiasis, cutaneous (112 3) (B37 2)   8  History of Dog bite (879 8,E906 0) (W54  0XXA)   9  History of Dysfunction of left eustachian tube (381 81) (H69 82)   10  History of Dysuria (788 1) (R30 0)   11  History of acute bronchitis (V12 69) (Z87 09)   12  History of acute sinusitis (V12 69) (Z87 09)   13  History of bronchitis (V12 69) (Z87 09)   14  History of cough   15  Denied: History of head injury   16  History of Pseudotumor cerebri (348 2) (G93 2)   17  Denied: History of Seizures   18  History of Suicide and self-inflicted injury (F053 8) (W83 0NJU)    The active problems and past medical history were reviewed and updated today  Allergies    1  No Known Drug Allergies    2  Seasonal    Current Meds   1  Accu-Chek Martha Device; test 4 times daily; Therapy: 34AGD5618 to (Last Rx:96Zmt4174)  Requested for: 90SWO9481 Ordered   2  Accu-Chek Martha STRP; TEST 4 TIMES DAILY; Therapy: 02RXJ5022 to (Evaluate:66Tac1858)  Requested for: 08AIO3843; Last   Rx:49Ryr9757 Ordered   3  Accu-Chek Soft Touch Lancets Miscellaneous; test blood sugar four times daily; Therapy: 05WQC3610 to (Evaluate:01Dqw5869)  Requested for: 59UGG8791; Last   Rx:92Uqv9577 Ordered   4  Belviq 10 MG Oral Tablet; one tab BID; Therapy: 64KGF4807 to (Evaluate:11Aug2017); Last Rx:11Awt3486 Ordered   5   Fluticasone Propionate 50 MCG/ACT Nasal Suspension; USE 2 SPRAYS IN EACH   NOSTRIL ONCE DAILY; Therapy: 34KZS6225 to (Last Rx:11May2017)  Requested for: 21HXC0127 Ordered   6  HydrOXYzine HCl - 50 MG Oral Tablet; TAKE 1 1/2 TO 2 TABLETS AT BEDTIME AS   NEEDED FOR INSOMNIA; Therapy: 32PYS8130 to (Evaluate:12Aug2017)  Requested for: 12KID3074; Last   Rx:13Jun2017 Ordered   7  LamoTRIgine 200 MG Oral Tablet; TAKE 2 TABLETS AT BEDTIME; Therapy: 41PPI6647 to (Evaluate:10Oct2017)  Requested for: 12Jun2017; Last   Rx:12Jun2017 Ordered   8  Latuda 120 MG Oral Tablet; TAKE 1 TABLET IN THE EVENING WITH FOOD; Therapy: 33Tqr0249 to (Evaluate:11Oct2017)  Requested for: 94IAT6736; Last   Rx:13Jun2017 Ordered   9  Levocetirizine Dihydrochloride 5 MG Oral Tablet; TAKE 1 TABLET BY MOUTH ONCE A   DAY; Therapy: 22Byu3181 to (Evaluate:15Sep2017)  Requested for: 61TPR7823; Last   Rx:18May2017 Ordered   10  Levothyroxine Sodium 100 MCG Oral Tablet; take 1 tablet by mouth every day; Therapy: 68IMH9536 to (Evaluate:06Aug2017)  Requested for: 37OOK7585; Last    Rx:09Nov2016 Ordered   11  Lisinopril 10 MG Oral Tablet; TAKE 1 TABLET BY MOUTH EVERY DAY FOR BLOOD    PRESSURE; Therapy: 94LGQ8476 to (Evaluate:15Sep2017)  Requested for: 80VFA2099; Last    Rx:18May2017 Ordered   12  Lithium Carbonate  MG Oral Tablet Extended Release; Take 2 and 1/2 tablets at    bedtime; Therapy: 12EXS8904 to (Evaluate:10Oct2017)  Requested for: 12Jun2017; Last    Rx:12Jun2017 Ordered   13  LORazepam 0 5 MG Oral Tablet; Take 1 tablet 3 times daily  as needed; Therapy: 12Agm9510 to (Evaluate:53Zti1468); Last Rx:27Jan2017 Ordered   14  Myrbetriq 50 MG Oral Tablet Extended Release 24 Hour; Take one tablet daily; Therapy: 07KII3697 to (Last Rx:10Vex8612)  Requested for: 41OOE8512; Status: ACTIVE -    Retrospective By Protocol Authorization Ordered   15  Neurontin 300 MG Oral Capsule; take 1 capsule daily; Therapy: (Recorded:25Hvn6760) to Recorded   16   Omeprazole 20 MG Oral Capsule Delayed Release; take 1 capsule daily as needed; Therapy: 05EZW1731 to (Aisha Patel)  Requested for: 24WKO8928; Last    Rx:97Dad1268 Ordered   17  ROPINIRole HCl - 3 MG Oral Tablet; TAKE 1 TABLET DAILY; Therapy: 62Yat9096 to (Evaluate:12Oct2017)  Requested for: 27HIZ5183; Last    Rx:38Oho2553 Ordered   18  Sertraline HCl - 50 MG Oral Tablet; TAKE 1 TAB BY MOUTH DAILY; Therapy: 33Uao6899 to (Evaluate:11Aug2017)  Requested for: 12Jun2017; Last    Rx:65Trn2930 Ordered    The medication list was reviewed and updated today  Family Psych History  Mother    1  Family history of bicuspid aortic valve (V17 49) (Z82 79)   2  Family history of depression (V17 0) (Z81 8)   3  Family history of Hypertension (V17 49)   4  Family history of Status post aortic valve replacement  Father    5  Family history of Hypertension (V17 49)   6  Family history of Hypothyroidism   7  No family history of mental disorder  Brother    8  Family history of Hypertension (V17 49)  Maternal Grandmother    9  Family history of Cancer   10  Family history of cerebrovascular accident (V17 1) (Z82 3)   11  Family history of Heart Disease (V17 49)  Paternal Grandmother    15  Family history of Cancer   13  Family history of malignant neoplasm (V16 9) (Z80 9)  Maternal Grandfather    15  Family history of pneumonia (V18 8) (Z83 1)  Paternal Grandfather    13  Family history of pneumonia (V18 8) (Z83 1)  Family History    12  Family history of Arthritis (716 90) (M19 90)   17  Family history of Breast Cancer (V16 3)   18  Family history of osteoporosis (V17 81) (Z82 62)   19  Family history of Hypertension (V17 49)   20  Family history of Transient Ischemic Attack    The family history was reviewed and updated today         Social History    · Caffeine Use   · Currently sexually active   · Disabled   · Former smoker (X05 35) (R33 135)   · Has no children   · High school graduate   · Lives with parents   · Never smoked   · No alcohol use   · Single   · Tea  The social history was reviewed and updated today  The social history was reviewed and is unchanged  No changes as of 7/21/2017      History Of Phys/Sex Abuse Or Perpetration    History Of Phys/Sex Abuse or Perpetration: Pt denies any h/o physical or sexual abuse but felt emotionally neglected due to her mom's mental illness (MDD)  Her maternal grandparents helped take care of Pt when her mom was in the hospital for psychiatric admissions  Parents were  when Pt was 1y/o so was always available  Education      Pt denies any h/o learning disabilities and reached childhood milestones on time as far as she knows  Graduated high school 2002  Completed some college     End of Encounter Meds    1  Levocetirizine Dihydrochloride 5 MG Oral Tablet; TAKE 1 TABLET BY MOUTH ONCE A   DAY; Therapy: 40Wgq0340 to (Evaluate:26Ryu0913)  Requested for: 31TWP3617; Last   Rx:66Tnb9022 Ordered    2  Sertraline HCl - 100 MG Oral Tablet (Zoloft); TAKE 1 TAB BY MOUTH DAILY; Therapy: 04Urm5198 to (Evaluate:40Kgo9256)  Requested for: 75Dtw3337; Last   Rx:40Oui2737 Ordered    3  LamoTRIgine 200 MG Oral Tablet; TAKE 2 TABLETS AT BEDTIME; Therapy: 73QKZ1874 to (Evaluate:10Oct2017)  Requested for: 12Jun2017; Last   Rx:12Jun2017 Ordered   4  Latuda 120 MG Oral Tablet; TAKE 1 TABLET IN THE EVENING WITH FOOD; Therapy: 55Ium1572 to (Evaluate:11Oct2017)  Requested for: 26LZU0059; Last   Rx:13Jun2017 Ordered   5  Lithium Carbonate  MG Oral Tablet Extended Release; Take 2 and 1/2 tablets at   bedtime; Therapy: 47GSP9955 to (Evaluate:10Oct2017)  Requested for: 12Jun2017; Last   Rx:12Jun2017 Ordered    6  Omeprazole 20 MG Oral Capsule Delayed Release; take 1 capsule daily as needed; Therapy: 75RBH9611 to (Sangeetha Alonso)  Requested for: 58Rvf7404; Last   Rx:27Lnu4879 Ordered    7  HydrOXYzine HCl - 50 MG Oral Tablet; TAKE 1 1/2 TO 2 TABLETS AT BEDTIME AS   NEEDED FOR INSOMNIA;    Therapy: 26RWM2749 to (Evaluate:70Bbw6551)  Requested for: 19VCW0926; Last   Rx:13Jun2017 Ordered   8  LORazepam 0 5 MG Oral Tablet; Take 1 tablet 3 times daily  as needed; Therapy: 31Sxf3252 to (Evaluate:34Uhe3600); Last Rx:09Hkm1579 Ordered    9  Lisinopril 10 MG Oral Tablet; TAKE 1 TABLET BY MOUTH EVERY DAY FOR BLOOD   PRESSURE; Therapy: 29DCA2462 to (Evaluate:15Sep2017)  Requested for: 07ZAV2608; Last   Rx:30Anf3965 Ordered    10  Levothyroxine Sodium 100 MCG Oral Tablet (Synthroid); take 1 tablet by mouth every day; Therapy: 95TED5969 to (Evaluate:38Tkx7639)  Requested for: 17VLX6608; Last    Rx:09Nov2016 Ordered    11  Belviq 10 MG Oral Tablet; one tab BID; Therapy: 68EOQ8137 to (Evaluate:11Aug2017); Last Rx:16Aql5805 Ordered    12  Accu-Chek Martha Device; test 4 times daily; Therapy: 68RCL5089 to (Last Rx:41Ywg5310)  Requested for: 50VYM3449 Ordered   13  Accu-Chek Martha STRP; TEST 4 TIMES DAILY; Therapy: 67NCM6947 to (Evaluate:15May2016)  Requested for: 03QET2072; Last    Rx:89Noi7037 Ordered   14  Accu-Chek Soft Touch Lancets Miscellaneous; test blood sugar four times daily; Therapy: 88OEK1558 to (Evaluate:13Apr2016)  Requested for: 63VZG5830; Last    Rx:44Wrl6693 Ordered    15  Fluticasone Propionate 50 MCG/ACT Nasal Suspension; USE 2 SPRAYS IN EACH    NOSTRIL ONCE DAILY; Therapy: 19ZQG5054 to (Last Rx:11May2017)  Requested for: 53IIK9846 Ordered    16  ROPINIRole HCl - 3 MG Oral Tablet; TAKE 1 TABLET DAILY; Therapy: 69Yoh7168 to (Evaluate:12Oct2017)  Requested for: 13CLV6819; Last    Rx:14Jun2017 Ordered    17  Myrbetriq 50 MG Oral Tablet Extended Release 24 Hour; Take one tablet daily; Therapy: 18LEE5438 to (Last Rx:64Iyg7778)  Requested for: 47WTG4326; Status: ACTIVE -    Retrospective By Protocol Authorization Ordered    18  Neurontin 300 MG Oral Capsule (Gabapentin); take 1 capsule daily;     Therapy: (Recorded:37Qyo8586) to Recorded    Future Appointments    Date/Time Provider Specialty Site   08/28/2017 09:00 AM SCOTTIE Curtis Psychiatry [facility]   08/29/2017 03:00 PM PAUL Mark   Urology [address]   08/01/2017 03:00 PM Nurse, Urology Robert   [facility]   08/01/2017 05:00 PM [place], [address] [facility]   08/04/2017 11:00 AM [place], [address] [facility]   08/08/2017 05:00 PM [place], [address] [facility]   08/10/2017 11:00 AM [place], [address] [facility]   08/15/2017 05:00 PM [place], [address] [facility]   08/18/2017 11:00 AM [place], [address] [facility]   08/22/2017 05:00 PM [place] [facility]   08/24/2017 11:00 AM [place], [address] [facility]   08/29/2017 05:00 PM [place] [facility]   08/31/2017 11:00 AM [place], [address] [facility]   09/05/2017 05:00 PM [place] [facility]   09/07/2017 11:00 AM [place], [address] [facility]   09/14/2017 08:00 AM [place], [address] [facility]   09/21/2017 08:00 AM [place], [address] [facility]   09/28/2017 08:00 AM [place], [address] [facility]   10/30/2017 08:30 AM [place] [facility] [address]     Signatures   Electronically signed by : SCOTTIE Zamorano; Jul 21 2017  3:42PM EST                       (Author)    Electronically signed by : PAUL Ware ; Jul 27 2017 11:02AM EST                       (Author)

## 2018-01-12 NOTE — MISCELLANEOUS
Yoni Pelayo's PMD 1  Dr Fransisco Rogers contacted me today in regards to starting Belviq  The doctor has not yet started the medicine and wanted collaboration  We are mutually aware of the possible mood and anxiety SE of the drug and agreed that if started, Pt will be f/u at 2 week intervals for at least the first couple of months to ensure no adverse effects  However, I will be away 7/31/2017 and Dr Fransisco Rogers will be having surgery--but does not have a date as of yet  She will see the Pt and discuss the above with her, and when she prescribes the Belviq, she will give me a call to coordinate 2 week f/u visits between both offices         1 Amended By: Shiela Richmond; Jul 10 2017 11:40 AM EST    Signatures   Electronically signed by : SCOTTIE Mayo; Jul 10 2017 11:40AM EST                       (Author)

## 2018-01-12 NOTE — PSYCH
Progress Note  Psychotherapy Provided [facility]: Individual Psychotherapy 50 minutes provided today  Goals addressed in session:   1-3 D: Met with Elizabeth for Individual Therapy session  Elizabeth spoke today about her feelings re: her fear that her suicidality will return if her dog receives a diagnosis of cancer today  She reported that this is "the most overwhelming" of all of the struggles she deals with (the obsessive thoughts of killing herself)  A: Rosaura Stone has not been able to recognize that it is her thoughts that are most harmful to her and that the work of therapy is in recognizing and changing these  P: Upcoming sessions will be used to continue to address the above  She will continue to attend both weekly Trauma and ED group therapy sessions  Pain Scale and Suicide Risk [facility]: Current Pain Assessment: moderate to severe   On a scale of 0 to 10, the patient rates current pain at 5   Current suicide risk is low   Behavioral Health Treatment Plan [employer]: Diagnosis and Treatment Plan explained to patient, patient relates understanding diagnosis and is agreeable to Treatment Plan  Assessment    1  Binge-eating disorder, severe (783 6) (F50 81)   2  Severe bipolar I disorder, most recent episode depressed (296 53) (F31 4)   3  EILEEN (generalized anxiety disorder) (300 02) (F41 1)   4   Obsessive-compulsive disorder (300 3) (F42 9)    Signatures   Electronically signed by : Mindy Perez LCSW; Aug 18 2017 12:09PM EST                       (Author)

## 2018-01-12 NOTE — PSYCH
Progress Note  Psychotherapy Provided [facility]: Individual Psychotherapy 50 minutes provided today  Goals addressed in session:   1-3 D: Met with Elizabeth for Individual Therapy session  Elizabeth spoke today about her feelings re: her ED and her progress in working much more diligently on this issue  She and this worker reached out to her PCP to request starting Belviq  And, she agreed to continue daily walks and to follow through in starting a food / feelings journal    A: Elizabeth was able to speak openly as she verbalized re: the above  She appears to be extremely invested on the above  P: Upcoming sessions will be used to continue to address the above  She will continue to attend both weekly Trauma and ED group therapy sessions  Pain Scale and Suicide Risk [facility]: Current Pain Assessment: moderate to severe   On a scale of 0 to 10, the patient rates current pain at 5   Current suicide risk is moderate (see risk assessment checklist)   Behavioral Health Treatment Plan [address] [place] [address] [address]: Diagnosis and Treatment Plan explained to patient, patient relates understanding diagnosis and is agreeable to Treatment Plan  Assessment    1  Binge-eating disorder, severe (783 6) (F50 81)   2  Bipolar 1 disorder, depressed, moderate (296 52) (F31 32)   3  EILEEN (generalized anxiety disorder) (300 02) (F41 1)   4   Obsessive-compulsive disorder (300 3) (F42 9)    Signatures   Electronically signed by : Kerin Carrel, LCSW; Jul 6 2017 11:02AM EST                       (Author)

## 2018-01-12 NOTE — MISCELLANEOUS
Message  Second attempt to reach Keven Pinon on cell and home phone # or record  Pt states her medicines are not helping and has "Been on a lot of the same stuff for a while " She states she was not always accurate in her responses to Dr Miriam Marx questions at past visit when she was asked if she was feeling better on certain medicines  She reports compliance to her medications and has not stopped any of them or reduced the doses  She presently denies SI or HI and reports Obsessive/OCD is her biggest problem  I will increase Sertraline to 125mg qd and try a reduction in Lithium ER to 900mg total per day, since this is the mood stabilizer with much potential for weight gain  Will get Lithium level again--Pt will  the requisition tomorrow  If she remains stable, will try a further reduction in Lithium ER  I advised her to employ learned coping skills through therapy, have time for herself and get out of the home to do some things  Pt walks her dogs, does arts and crafts and has a few friends with whom she can meet for social outings  Pt verbalized understanding and acceptance of the above  I e-scribed the following to her [facility] pharmacy:   Sertraline 25mg (1) tab po qd # 30 R1 (She will also continue her 100mg (1) tab po qd --and has enough from last Rx)       Plan  Binge-eating disorder, severe    · From  Sertraline HCl - 25 MG Oral Tablet TAKE 1 TAB BY MOUTH DAILY    Take with the 50mg tablet To Sertraline HCl - 25 MG Oral Tablet TAKE 1 TAB BY MOUTH  DAILY    Take with the 100mg tablet  Bipolar 1 disorder, depressed, moderate    · From  Lithium Carbonate  MG Oral Tablet Extended Release Take 2  and 1/2 tablets at bedtime To Lithium Carbonate  MG Oral Tablet Extended  Release take 2 tablets at bedtime   · (1) LITHIUM; Status:Active;  Requested for:[id number];     Signatures   Electronically signed by : SCOTTIE Hernandez; Jul 27 2017  5:39PM EST                       (Author)

## 2018-01-12 NOTE — PSYCH
Progress Note  Psychotherapy Provided [facility]: Individual Psychotherapy 50 minutes provided today  Goals addressed in session:   1-3 D: Met with pt for Individual Therapy session  Elizabeth spoke today about her feelings re: her BED  Her Treatment Plan was updated to focus more diligently on this issue over the upcoming interval    A: Elizabeth acknowledged that she has avoided focusing on this subject in therapy and reasons for this were addressed  She admitted that her other issues have subsided at present  P: Upcoming sessions will be used to continue to address the above  She will continue to attend both weekly Trauma and ED group therapy sessions  Pain Scale and Suicide Risk [facility]: Current Pain Assessment: moderate to severe   On a scale of 0 to 10, the patient rates current pain at 3   Behavioral Health Treatment Plan [facility]: Diagnosis and Treatment Plan explained to patient, patient relates understanding diagnosis and is agreeable to Treatment Plan  Assessment    1  Binge-eating disorder, severe (783 6) (F50 81)   2  Bipolar 1 disorder, depressed, moderate (296 52) (F31 32)   3  EILEEN (generalized anxiety disorder) (300 02) (F41 1)   4   Obsessive-compulsive disorder (300 3) (F42 9)    Signatures   Electronically signed by : Crystal Damico LCSW; Jun 1 2017  3:08PM EST                       (Author)

## 2018-01-12 NOTE — PSYCH
Progress Note  Psychotherapy Provided [facility]: Group Therapy provided today  Goals addressed in session:   D: Pt  was seen for Group Therapy session  Pts engaged in a discussion re: Impact of ED on participant's accomplishments, the professional self, and the private self  A: Elizabeth was reluctant to share with group, but did provide feedback to peers and identified with their experiences  P: [facility] will continue to attend individual therapy on a weekly basis and the Adult ED group on a biweekly basis  Pain Scale and Suicide Risk [facility]: Current Pain Assessment: no pain   On a scale of 0 to 10, the patient rates current pain at 0   Current suicide risk is low   Behavioral Health Treatment Plan [facility]: Diagnosis and Treatment Plan explained to patient, patient relates understanding diagnosis and is agreeable to Treatment Plan  Assessment    1   Eating disorder, unspecified (307 50) (X82 9)    Signatures   Electronically signed by : Rangel Llanos, ; Feb 9 2016  2:54PM EST                       (Author)    Electronically signed by : [facility], [facility]; Feb 10 2016  8:11AM EST                       (Author)

## 2018-01-12 NOTE — PSYCH
Progress Note  Psychotherapy Provided [facility]: Individual Psychotherapy 50 minutes provided today  Goals addressed in session:   1-3 D: Met with pt for Individual Therapy session  Elizabeth spoke today about her feelings re: her body image and shame that she experiences surrounding having to have a procedure done in which health care professionals will have to view her body  This was processed at length  A: Elizabeth accepted support in this area and continues to work extremely hard in therapy on issues she has not spoken about in 8 years which may be becoming overwhelming to her  P: Upcoming sessions will be used to continue to address the above  She will continue to attend both weekly Trauma and ED group therapy sessions  Pain Scale and Suicide Risk [facility]: Current Pain Assessment: moderate to severe   On a scale of 0 to 10, the patient rates current pain at 5   Behavioral Health Treatment Plan [facility]: Diagnosis and Treatment Plan explained to patient, patient relates understanding diagnosis and is agreeable to Treatment Plan  Assessment    1  Binge-eating disorder, severe (783 6) (F50 81)   2  Bipolar I disorder, most recent episode depressed, mild (296 51) (F31 31)   3  EILEEN (generalized anxiety disorder) (300 02) (F41 1)   4  Gambling disorder, moderate (312 31) (F63 0)   5   Obsessive-compulsive disorder (300 3) (F42 9)    Signatures   Electronically signed by : Stephanie Romero LCSW; Mar  3 2017 11:09AM EST                       (Author)

## 2018-01-12 NOTE — PSYCH
Progress Note  Psychotherapy Provided [facility]: Group Therapy provided today  Goals addressed in session:   1 D: Pt attended Trauma Group  Pts engaged in a discussion re: the correlation between trauma histories and ED behaviors as coping  A: Pt openly shared her own history today on this topic while also providing support to peers  P: Pt will continue to be seen for individual and group therapy during which the above will be further processed  Pain Scale and Suicide Risk [facility]: Current Pain Assessment: moderate to severe   On a scale of 0 to 10, the patient rates current pain at 5   Behavioral Health Treatment Plan [facility]: Diagnosis and Treatment Plan explained to patient, patient relates understanding diagnosis and is agreeable to Treatment Plan  Assessment    1   EILEEN (generalized anxiety disorder) (300 02) (F41 1)    Signatures   Electronically signed by : Alessandro Richard LCSW; Apr 6 2017  2:19PM EST                       (Author)

## 2018-01-12 NOTE — PSYCH
Progress Note  Psychotherapy Provided [facility]: Individual Psychotherapy 50 minutes provided today  Goals addressed in session:   1-3 D: Met with pt for Individual Therapy session  Elizabeth spoke today about her feelings re: last week's family session, her attendance at her first Trauma group, and her memories about the beginning of her ED  A: Elizabeth was tearful as she processed the above  Her Treatment Plan was also updated  P: Upcoming sessions will be used to continue to address the above  She will continue to attend both weekly Trauma and ED group therapy sessions  Pain Scale and Suicide Risk [facility]: Current Pain Assessment: moderate to severe   On a scale of 0 to 10, the patient rates current pain at 6   Current suicide risk is low   Behavioral Health Treatment Plan [address] [place] [address] [address]: Diagnosis and Treatment Plan explained to patient, patient relates understanding diagnosis and is agreeable to Treatment Plan  Assessment    1  Bipolar 1 disorder, depressed, moderate (296 52) (F31 32)   2  Obsessive-compulsive disorder (300 3) (F42)   3   Other eating disorders (307 59) (F50 8)    Signatures   Electronically signed by : Tawanna Padilla LCSW; Sep 23 2016 10:04AM EST                       (Author)
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
## 2018-01-12 NOTE — PSYCH
Progress Note  Psychotherapy Provided [facility]: Individual Psychotherapy 50 minutes provided today  Goals addressed in session:   1-3 D: Met with pt for Individual Therapy session  Elizabeth spoke today about her feelings of frustration re: her relationship with her mother  Elizabeth verbalized her struggles with asserting herself towards her when she makes comments about her appearance or what she eats and the fear / guilt she has for how her mother may respond (becoming self-injurious / threatening)  A: Juan Diego Hendrix has begun to realize how mentally unwell her mother is and how this has contributed / prevented her own mental health  Elizabeth also expressed her frustration with receiving support from her Psychiatrist again today  P: Upcoming sessions will be used to continue to address the above  She will continue to attend both weekly Trauma and ED group therapy sessions  Pain Scale and Suicide Risk [facility]: Current Pain Assessment: moderate to severe   On a scale of 0 to 10, the patient rates current pain at 5   Behavioral Health Treatment Plan [facility]: Diagnosis and Treatment Plan explained to patient, patient relates understanding diagnosis and is agreeable to Treatment Plan  Assessment    1  Binge-eating disorder, severe (783 6) (F50 81)   2  Bipolar I disorder, most recent episode mixed, moderate (296 62) (F31 62)   3  Gambling disorder, moderate (312 31) (F63 0)   4  EILEEN (generalized anxiety disorder) (300 02) (F41 1)   5   Obsessive-compulsive disorder (300 3) (F42 9)    Signatures   Electronically signed by : Felix Perez LCSW; Mar 17 2017 11:57AM EST                       (Author)

## 2018-01-12 NOTE — PSYCH
Progress Note  Psychotherapy Provided [facility]: Individual Psychotherapy 50 minutes and Telephone Contact provided today  Goals addressed in session:   1: Pt was seen today for Individual Therapy session  Pt spoke about her feelings re: having continued to sleep very little over the past week, while decreasing her Prozac to 40 mgs  She has added Mellatonin at night, denies suicidality or depression and has not been gambling  A: Elizabeth was not depressed during today's session and able to engage and to consider ways to modify her OCD thoughts She has not had further contact with her ex paramour at this time  Taiwo Romeo P:Elizabeth will continue to attend Individual and Group therapy on a weekly basis  Pain Scale and Suicide Risk [facility]: Current Pain Assessment: moderate to severe   On a scale of 0 to 10, the patient rates current pain at 6   Behavioral Health Treatment Plan [address] [place] [address] [address]: Diagnosis and Treatment Plan explained to patient, patient relates understanding diagnosis and is agreeable to Treatment Plan  Assessment    1  Bipolar I disorder, most recent episode depressed, moderate (296 52) (F31 32)   2  EILEEN (generalized anxiety disorder) (300 02) (F41 1)   3  Eating disorder, unspecified (307 50) (F50 9)   4   Obsessive-compulsive disorder (300 3) (F42)    Signatures   Electronically signed by : Cathy Roca LCSW; Mar 24 2016  2:38PM EST                       (Author)

## 2018-01-12 NOTE — MISCELLANEOUS
Message   Recorded as Task   Date: 05/10/2017 04:47 PM, Created By: Peter Hope   Task Name: Med Renewal Request   Assigned To: Darren Bhardwaj   Regarding Patient: Saranya Case, Status: Active   CommentEarline Hait - 10 May 2017 4:47 PM     TASK CREATED  Hi, She will not see you before her Zoloft script runs out-- could you call in  a refill to [facility]?     tx   Oralee Brisk needs Sertraline renewal and returns for f/u 5/25/2017   I e-scribed the following to her [facility] pharmacy:  Sertraline 25mg (1) tab po qd # 9      Plan  Binge-eating disorder, severe, EILEEN (generalized anxiety disorder)    · Sertraline HCl - 25 MG Oral Tablet; take 1 tab po QD    Signatures   Electronically signed by : SCOTTIE Rogers; May 10 2017  5:35PM EST                       (Author)

## 2018-01-12 NOTE — PSYCH
Progress Note  Psychotherapy Provided [facility]: Individual Psychotherapy 50 minutes and Telephone Contact provided today  Goals addressed in session:   1: Pt was seen today for Individual Therapy session  Pt was reluctant to speak about her feelings and thoughts re: her ED with this worker today  She stated that she did not feel able to do so despite doing so during her last session  A: Elizabeth was able to share some of her feelings of anger toward her mother today and admitted she has been struggling with OCD rituals  She denied feelings of suicidality  P:Elizabeth will continue to attend Individual and Group therapy on a weekly basis  Pain Scale and Suicide Risk [facility]: On a scale of 0 to 10, the patient rates current pain at 3   Current suicide risk is low   Behavioral Health Treatment Plan [address] [place] [address] [address]: Diagnosis and Treatment Plan explained to patient, patient relates understanding diagnosis and is agreeable to Treatment Plan  Assessment    1  Eating disorder, unspecified (307 50) (F50 9)   2  Gambling disorder, moderate (312 31) (F63 0)   3   Bipolar 1 disorder, depressed, moderate (296 52) (F31 32)    Signatures   Electronically signed by : Kosta Barahona LCSW; Aug 11 2016  3:06PM EST                       (Author)

## 2018-01-12 NOTE — PSYCH
Progress Note  Psychotherapy Provided [facility]: Individual Psychotherapy 50 minutes and Telephone Contact provided today  Goals addressed in session:   1: Pt was seen today for Individual Therapy session  Pt reported that she invited her father to attend her next session  he also shared that she sold her car and will be ride sharing with her mother for at least the next year  A: Elizabeth was not depressed during today's session  She admitted that she has struggled with sexual dreams that she is ashamed of  These were processed with her and explained as a part of her OCD diagnosis as she has no history of sexual abuse  P:Elizabeth will continue to attend Individual and Group therapy on a weekly basis  Pain Scale and Suicide Risk [facility]: Current Pain Assessment: moderate to severe   On a scale of 0 to 10, the patient rates current pain at 6   Behavioral Health Treatment Plan [facility]: Diagnosis and Treatment Plan explained to patient, patient relates understanding diagnosis and is agreeable to Treatment Plan  Assessment    1  Bipolar I disorder, most recent episode depressed, moderate (296 52) (F31 32)   2  Eating disorder, unspecified (307 50) (F50 9)   3  EILEEN (generalized anxiety disorder) (300 02) (F41 1)   4   Obsessive-compulsive disorder (300 3) (F42)    Signatures   Electronically signed by : Krista Jay LCSW; Feb 11 2016  2:03PM EST                       (Author)

## 2018-01-12 NOTE — PSYCH
Progress Note  Psychotherapy Provided [facility]: Individual Psychotherapy 50 minutes provided today  Goals addressed in session:   1-3 D: Met with Elizabeth for Individual Therapy session  Elizabeth spoke today about her feelings re: her conflicts with her mother and decision that it is time to locate housing outside of her home  A: Elizabeth vacillated between tearfulness and determination during today's session  She requested this worker's support in contacting the [facility] for assistance in arranging an Intake appt for housing assistance  P: Upcoming sessions will be used to continue to address the above  She will continue to attend both weekly Trauma and ED group therapy sessions  Pain Scale and Suicide Risk [facility]: Current Pain Assessment: moderate to severe   On a scale of 0 to 10, the patient rates current pain at 7   Current suicide risk is low   Behavioral Health Treatment Plan [facility]: Diagnosis and Treatment Plan explained to patient, patient relates understanding diagnosis and is agreeable to Treatment Plan  Assessment    1  Binge-eating disorder, severe (783 6) (F50 81)   2  EILEEN (generalized anxiety disorder) (300 02) (F41 1)   3  Obsessive-compulsive disorder (300 3) (F42 9)   4  PTSD (post-traumatic stress disorder) (309 81) (F43 10)   5   Severe bipolar I disorder, most recent episode depressed (296 53) (F31 4)    Signatures   Electronically signed by : [facility], [facility]; Nov 2 2017  2:04PM EST                       (Author)

## 2018-01-12 NOTE — PSYCH
Progress Note  Psychotherapy Provided [facility]: Individual Psychotherapy 50 minutes provided today  Goals addressed in session:   1-3 D: Met with pt for Individual Therapy session  Elizabeth spoke today about her feelings re: not only successfully volunteering but helping make several improvements to the process of organizing gifts at the center  Negative / irrational thoughts were challenged  A: Jorge Snyder continues to be open and insightful as she works on her desire to develop a stronger sense of self as she works on her addictions  P: Upcoming sessions will be used to continue to address the above  She will continue to attend both weekly Trauma and ED group therapy sessions  Pain Scale and Suicide Risk [facility]: Current Pain Assessment: moderate to severe   On a scale of 0 to 10, the patient rates current pain at 4   Behavioral Health Treatment Plan [facility]: Diagnosis and Treatment Plan explained to patient, patient relates understanding diagnosis and is agreeable to Treatment Plan  Assessment    1  Binge-eating disorder, severe (783 6) (F50 81)   2  Bipolar I disorder, most recent episode depressed, in full remission (296 56) (F31 76)   3  Gambling disorder, moderate (312 31) (F63 0)   4   Obsessive-compulsive disorder (300 3) (F42 9)    Signatures   Electronically signed by : Lynn Mariscal LCSW; Dec  2 2016  9:52AM EST                       (Author)

## 2018-01-12 NOTE — PSYCH
Psych Med Mgmt    Appearance: was calm and cooperative, adequate hygiene and grooming and good eye contact  Observed mood: depressed and anxious  Observed mood: affect was constricted  Speech: a normal rate and fluent   Normal volume  Thought processes: coherent/organized   obsessive, ruminative  Hallucinations: no hallucinations present  Thought Content: no delusions  Abnormal Thoughts: The patient has no suicidal thoughts and no homicidal thoughts  Orientation: The patient is oriented to person, place and time  Recent and Remote Memory: short term memory intact and long term memory intact  Attention Span And Concentration: concentration intact  Insight: Insight intact  Judgment: Fair judgement   Muscle Strength And Tone  Normal gait and station  Language: no difficulty naming common objects and no difficulty repeating a phrase  Fund of knowledge: Patient displays adequate knowledge of current events, adequate fund of knowledge regarding past history and adequate fund of knowledge regarding vocabulary  The patient is experiencing no localized pain  Treatment Recommendations: Pt is having severe binging disorder Sxs and moderate mood and anxiety--all for which I will increase Sertraline  Pt presently denies SI and verbally contracts for safety  Pt does not appear to be a danger to self or others at this time and is stable for f/u outpatient  She has the crisis line # and I gave the [employer] #  I advised IOP and gave resource of [facility] [employer] for eating disorders management  I advised her to make judicious use of Lorazepam for anxiety  I will increase Hydroxyzine for sleep  Sleep hygiene discussed--(ie she drinks green tea at night and I told her to refrain 3 hours from bedtime)  I advised Pt accepts the plan    Increase:  Sertraline to 50mg (1) tab po qd # 30  Hydroxyzine to 50mg (1 1/2-2) tabs po qhs prn insomnia # 60  Continue the following, of which she has refills from prior Rxs:  Lamotrigine 200mg (2) tabs po qhs   Lithium ER 450mg (2 1/2) tabs po qhs  Lorazepam 0 5mg (1) tab po tid prn anxiety  Vit D OTC preparation  Return 6/14/2017    Risks, Benefits And Possible Side Effects Of Medications: Risks, benefits, and possible side effects of medications explained to patient and patient verbalizes understanding  Discussed with patient the risks of sedation, respiratory depression, impairment of ability to drive and potential for abuse and addiction related to treatment with benzodiazepine medications  The patient understands risk of treatment with benzodiazepine medications, agrees to not drive if feels impaired and agrees to take medications as prescribed  The patient has been filling controlled prescriptions on time as prescribed to [facility] program     She reports normal appetite, normal energy level, no weight change and decrease in number of sleep hours Insomnia  Miri Ag is a 30y/o female here sooner than scheduled appt for primary c/o "I feel like the OCD stuff has subsided some, but I'm also more depressed " However, she sees the depression as a function of her OCD thoughts  Her OCD goes through "Cycles" where she gets worse for a while, then a bit better  Her binging worsened over the past 3 weeks because her mom's mental illness has worsened  She has been binging 2-3x daily  At night, if she cannot sleep, she gets the thought that she should eat to help her go back to sleep because food can have a sedating effect  She denies restricting, purging or compensatory behaviors  Pt lives with mom and step-father and cannot move out because she is emotionally attached to the home and she cannot financially afford to move out  Pt is depressed, hopeless, overwhelmed, with reduced motivation, but still keeping busy with friends and her appointments   She had SI 4 days ago when her mom "Flipped out on me " There was no plan or attempt  Pt binged as a response  She cut herself on Rt leg approx 2 weeks go due to stress involving parents  She is anxious and had panic attacks--but these are not as severe with the Sertraline  Sleep is minimally better with 1 full tab of Hydroxyzine  She does not use the Lorazepam much due to fear of it making her less motivated, although it does not make her tired  Shopping is done as a compulsion, and once she buys an item, that tension and anxiety are released "Until the next thing " She presently denies SI, HI, manic or psychotic Sxs  She has access to step-father's firearms, but adamantly denies any SI or plan to use them--she states "I wouldn't know what to do " She continues psychotherapy with Ms Pineda, whose 4/18/2017 - 5/24/2017 notes I reviewed  Vitals  Signs   Recorded: 58YVT1041 02:20PM   Heart Rate: 66  Systolic: 831  Diastolic: 75  Height: 5 ft 8 in  LMP: Approx [id number]    Assessment    1  Bipolar 1 disorder, depressed, moderate (296 52) (F31 32)   2  EILEEN (generalized anxiety disorder) (300 02) (F41 1)   3  Binge-eating disorder, severe (783 6) (F50 81)   4  Obsessive-compulsive disorder (300 3) (F42 9)   5  Vitamin D deficiency (268 9) (E55 9)    Plan    1  Sertraline HCl - 50 MG Oral Tablet; TAKE 1 TAB BY MOUTH DAILY    2  HydrOXYzine HCl - 50 MG Oral Tablet; Take 1 1/2-2 tabs po qhs prn insomnia    Review of Systems    Constitutional: No fever, no chills, feels well, no tiredness, no recent weight gain or loss  Cardiovascular: no complaints of slow or fast heart rate, no chest pain, no palpitations  Respiratory: no complaints of shortness of breath, no wheezing, no dyspnea on exertion  Gastrointestinal: no complaints of abdominal pain, no constipation, no nausea, no diarrhea, no vomiting  Genitourinary: no complaints of dysuria, no incontinence, no pelvic pain, no urinary frequency  Neurological: no complaints of headache, no confusion, no numbness, no dizziness  Past Psychiatric History    Past Psychiatric History: Pt felt emotional neglect by mom who was raising Pt and her older brother as a single parent  Mom also had mental illness (MDD) and Pt (at 8y/o) witnessed her try to OD on pills  Mom also used to binge eat and eating has been a coping mechanism in the family  Pt first felt Sxs of a psychiatric nature at 14y/o --OCD Sxs of cleaning at first, then became obsessive over Wt loss and began dieting then restricting foods and exercising excessively, writing a list of goals/chores for the day--ie to clean, organize, "Line up something " Any new thing she needs to do or wants to get done gets added to the list  She then ruminates about what she has to get done for the day  Through time, the list has grown and when Sxs began, she got some satisfaction with completing them  In later years, even after she completes an item on the list, she only worries about what else she will need to do  She states "I know there's always more" to add to the list    She was "Slightly overweight" in teen years and was active (played basketball in school), but genetics caused a degree of Wt gain  Pt initially addressed her Wt issue by changed the kinds of foods she was eating, but within 1 month began to restrict meals daily  She started to exercise daily and was losing 20lbs per month at 20y/o  She first saw a psychologist at approx 15y/o because she thinks her mom thought she was depressed  Pt only went to that therapist for a few session and did not want to keep going  She first saw a psychiatrist at 14y/o and was formally diagnosed --but Pt does not recall the Dx  She knows she was given Prozac but stopped it at the advice of her mother who was worried about any extra things causing problems  Pt had other medical issues going on-- had developed pseudotumor cerebri around that time     The Prozac was stopped and she was not on another psychiatric medication until admission to [facility] in 2006  She was given Seroquel briefly at that time and an antidepressant--cannot recall which one  She thinks she may have been depressed in childhood but first became aware of Sxs of depression at 16y/o--felt ignored by her mother, she had lost contact with many school friends due to debilitation from pseudotumor cerebri which kept her home schooled for the last 2 years of high school  Depressive Sxs were years of daily sadness, feeling overwhelmed, hopelessness, worthlessness, anhedonia, self-isolating, hypersomnolence and SI  She self-mutilated (cutting her arms and stomach) [phone]  She started cutting due to concern about weight  She felt suicidal over her weight, but when she could not go through with suicide, she cut instead  She "Gambles" with [employer] tickets in adulthood--was spending at least $500 per month 2014 but reduced it greatly since 11/2016  The desire to spend by gambling was reduced somewhat on it's own  However, the spending did not stop completely and she started buying clothing instead  Her anxiety was accompanied by worry, irritability, restlessness, lack of concentration, worse OCD Sxs in terms of doing more cleaning  and "Mildly" panic attacks  Panic Sxs were moderate anxiety, a little bit of fear, ruminating about things on her list, sometimes tachycardia  She has tried volunteer work but it was stressful trying to make it to her obligation on time  Pt has h/o multiple psychiatric admissions:  [facility] [address] [facility] outpatient eating disorder program in 2006 and 2008 for anorexia nervosa--was restricting, but also over-exercising, sometimes binging in small amounts  Tried purging once (self-induced vomiting) in the past     [facility] 2008 and 2009    [facility] 2013    [facility] multiple times in the past to avoid repeat hospitalizations      During an admission to [facility] in 2008 she was given ECT  On discharge she was referred for psychotherapy with Nael Elena at [place] [address] and was from there referred to outpatient psychiatry here as well  Pt states she was first seen by Dr Pam Copeland in approx 2009 and was given a diagnosis of bipolar I disorder for the depressive Sxs above and irritable moods, rapid speech, distractibility--Pt unsure of other Sxs  No past paper chart available at this time  She was started on Lamotrigine and Fluoxetine was restarted  Doses of Fluoxetine above 20mg caused "Hyperactivity" and anxiety  Pt denies any h/o psychotic Sxs  One suicidal attempt --approx 2009 by OD on her psychiatric medications  Admitted to [facility]  On permanent disability since 2007 for mental illness    Pt tried: Bupropion SR, Buspirone, Citalopram, Effexor, Trazodone, Aripiprazole, Seroquel, Depakote, Topiramate, Clonazepam         Substance Abuse Hx    Substance Abuse History: Pt denies any h/o ETOH or illicit drug abuse    She has an alcoholic drink rarely, and when she does, it is only on special occasions/holidays  She tried THC once  Active Problems    1  Abnormal weight gain (783 1) (R63 5)   2  Acute bronchitis (466 0) (J20 9)   3  Acute diverticulitis (562 11) (K57 92)   4  Acute frontal sinusitis (461 1) (J01 10)   5  Acute pain (338 19) (R52)   6  Allergic rhinitis (477 9) (J30 9)   7  Amenorrhea (626 0) (N91 2)   8  Binge-eating disorder, severe (783 6) (F50 81)   9  Bronchitis (490) (J40)   10  Bulging lumbar disc (722 10) (M51 26)   11  Cough (786 2) (R05)   12  Dysuria (788 1) (R30 0)   13  Esophageal reflux (530 81) (K21 9)   14  Foot pain (729 5) (M79 673)   15  EILEEN (generalized anxiety disorder) (300 02) (F41 1)   16  Gambling disorder, moderate (312 31) (F63 0)   17  Hypertension (401 9) (I10)   18  Hypothyroidism (244 9) (E03 9)   19  Long term current use of therapeutic drug (V58 69) (Z79 899)   20   Lumbago (724 2) (M54 5)   21  Lumbar degenerative disc disease (722 52) (M51 36)   22  Morbid or severe obesity due to excess calories (278 01) (E66 01)   23  Nocturnal enuresis (788 36) (N39 44)   24  Obsessive-compulsive disorder (300 3) (F42 9)   25  Overactive bladder (596 51) (N32 81)   26  Prediabetes (790 29) (R73 09)   27  RLS (restless legs syndrome) (333 94) (G25 81)   28  Urinary incontinence, unspecified type (788 30) (R32)   29  Vitamin D deficiency (268 9) (E55 9)    Past Medical History    1  History of Abscess of face (682 0) (L02 01)   2  History of Acute nonsuppurative otitis media, unspecified laterality (381 00) (H65 199)   3  History of Anorexia nervosa in remission (307 1) (F50 00)   4  History of Backache (724 5) (M54 9)   5  History of Candidiasis, cutaneous (112 3) (B37 2)   6  History of Dog bite (879 8,E906 0) (W54  0XXA)   7  History of Dysfunction of left eustachian tube (381 81) (H69 82)   8  History of Dysuria (788 1) (R30 0)   9  History of acute sinusitis (V12 69) (Z87 09)   10  Denied: History of head injury   11  History of Pseudotumor cerebri (348 2) (G93 2)   12  Denied: History of Seizures   13  History of Suicide and self-inflicted injury (C582 8) (X05 8PDC)    The active problems and past medical history were reviewed and updated today  Allergies    1  No Known Drug Allergies    2  Seasonal    Current Meds   1  Accu-Chek Martha Device; test 4 times daily; Therapy: 68MRR6783 to (Last Rx:20Qiy0566)  Requested for: 77BBF5381 Ordered   2  Accu-Chek Martha STRP; TEST 4 TIMES DAILY; Therapy: 92JBW3066 to (Evaluate:37Dpk2566)  Requested for: 43OTD3512; Last   Rx:66Eyt4673 Ordered   3  Accu-Chek Soft Touch Lancets Miscellaneous; test blood sugar four times daily; Therapy: 49EIX4350 to (Evaluate:75Baf7266)  Requested for: 48UID0108; Last   Rx:76Grs3960 Ordered   4  Fluticasone Propionate 50 MCG/ACT Nasal Suspension; USE 2 SPRAYS IN EACH   NOSTRIL ONCE DAILY;    Therapy: 46PXL2927 to (Last RX:12ZJR1852)  Requested for: 01BBU7347 Ordered   5  HydrOXYzine HCl - 50 MG Oral Tablet; Take 1/2-1 tab po qhs prn insomnia; Therapy: 18DAJ5057 to (Evaluate:11Jun2017)  Requested for: 95OXD0824; Last   Rx:12May2017 Ordered   6  LamoTRIgine 200 MG Oral Tablet; TAKE 2 TABLETS AT BEDTIME; Therapy: 18ZIQ9825 to (Graciela Flynn)  Requested for: 80BTY3705; Last   Rx:27Jan2017 Ordered   7  Latuda 120 MG Oral Tablet; TAKE 1 TABLET IN THE EVENING WITH FOOD; Therapy: 08Jok5121 to (Graciela Flynn)  Requested for: 28TVP1847; Last   Rx:27Jan2017; Status: ACTIVE - Renewal Denied Ordered   8  Levocetirizine Dihydrochloride 5 MG Oral Tablet; TAKE 1 TABLET BY MOUTH ONCE A   DAY; Therapy: 17Mum0342 to (Evaluate:66Jcc9461)  Requested for: 90HRD2112; Last   Rx:18May2017 Ordered   9  Levothyroxine Sodium 100 MCG Oral Tablet; take 1 tablet by mouth every day; Therapy: 50SRJ5241 to (Evaluate:67Kbe4960)  Requested for: 80LCI0787; Last   Rx:09Nov2016 Ordered   10  Lisinopril 10 MG Oral Tablet; TAKE 1 TABLET BY MOUTH EVERY DAY FOR BLOOD    PRESSURE; Therapy: 40CPL3929 to (Evaluate:60Ces7684)  Requested for: 45HMX9384; Last    Rx:18May2017 Ordered   11  Lithium Carbonate  MG Oral Tablet Extended Release; Take 2 and 1/2 tablets at    bedtime; Therapy: 44MYR3274 to (Nevelyn Clear)  Requested for: 53KLB3541; Last    Rx:03Mar2017 Ordered   12  LORazepam 0 5 MG Oral Tablet; Take 1 tablet 3 times daily  as needed; Therapy: 66Jzt0378 to (Evaluate:57Pfz2784); Last Rx:27Jan2017 Ordered   13  Myrbetriq 50 MG Oral Tablet Extended Release 24 Hour; Take one tablet daily; Therapy: 25NZR2828 to (Last Rx:29Mar2017)  Requested for: 29Mar2017 Ordered   14  Neurontin 300 MG Oral Capsule; take 1 capsule daily; Therapy: (Recorded:90Kde6213) to Recorded   15  Omeprazole 20 MG Oral Capsule Delayed Release; take 1 capsule daily as needed;     Therapy: 82TCD7808 to (57 047905)  Requested for: 58Zga6923; Last Rx: 68YPO4157 Ordered   16  ROPINIRole HCl - 2 MG Oral Tablet; Take 1 tablet by mouth at bedtime; Therapy: 73Vco9024 to (Evaluate:30Jun2017)  Requested for: 42BKQ5613; Last    Rx:02Mar2017 Ordered   17  Sertraline HCl - 25 MG Oral Tablet; take 1 tab po QD; Therapy: 60Dxv9547 to (Evaluate:11Jun2017)  Requested for: 51OFH7242; Last    Rx:90Dxq3712 Ordered    The medication list was reviewed and updated today  Family Psych History  Mother    1  Family history of bicuspid aortic valve (V17 49) (Z82 79)   2  Family history of depression (V17 0) (Z81 8)   3  Family history of Hypertension (V17 49)   4  Family history of Status post aortic valve replacement  Father    5  Family history of Hypertension (V17 49)   6  Family history of Hypothyroidism   7  No family history of mental disorder  Brother    8  Family history of Hypertension (V17 49)  Maternal Grandmother    9  Family history of Cancer   10  Family history of cerebrovascular accident (V17 1) (Z82 3)   11  Family history of Heart Disease (V17 49)  Paternal Grandmother    15  Family history of Cancer   13  Family history of malignant neoplasm (V16 9) (Z80 9)  Maternal Grandfather    15  Family history of pneumonia (V18 8) (Z83 1)  Paternal Grandfather    13  Family history of pneumonia (V18 8) (Z83 1)  Family History    12  Family history of Arthritis (716 90) (M19 90)   17  Family history of Breast Cancer (V16 3)   18  Family history of osteoporosis (V17 81) (Z82 62)   19  Family history of Hypertension (V17 49)   20  Family history of Transient Ischemic Attack    The family history was reviewed and updated today  Social History    · Caffeine Use   · Currently sexually active   · Disabled   · Former smoker (B66 99) (Y09 794)   · Has no children   · High school graduate   · Lives with parents   · Never smoked   · No alcohol use   · Single   · Tea  The social history was reviewed and updated today  The social history was reviewed and is unchanged  No changes as of 5/25/2017      History Of Phys/Sex Abuse Or Perpetration    History Of Phys/Sex Abuse or Perpetration: Pt denies any h/o physical or sexual abuse but felt emotionally neglected due to her mom's mental illness (MDD)  Her maternal grandparents helped take care of Pt when her mom was in the hospital for psychiatric admissions  Parents were  when Pt was 1y/o so was always available  Education  Pt denies any h/o learning disabilities and reached childhood milestones on time as far as she knows  Graduated high school 2002  Completed some college     End of Encounter Meds    1  Fluticasone Propionate 50 MCG/ACT Nasal Suspension; USE 2 SPRAYS IN EACH   NOSTRIL ONCE DAILY; Therapy: 02GZC1156 to (Last Rx:11May2017)  Requested for: 90EUH6294 Ordered    2  Levocetirizine Dihydrochloride 5 MG Oral Tablet; TAKE 1 TABLET BY MOUTH ONCE A   DAY; Therapy: 74Zcz1757 to (Evaluate:91Jzl8439)  Requested for: 52OJS2294; Last   Rx:18May2017 Ordered    3  Sertraline HCl - 50 MG Oral Tablet; TAKE 1 TAB BY MOUTH DAILY; Therapy: 43Qlp2787 to (Evaluate:24Jun2017)  Requested for: 54TGK1608; Last   Rx:25May2017 Ordered    4  LamoTRIgine 200 MG Oral Tablet; TAKE 2 TABLETS AT BEDTIME; Therapy: 40NSE8610 to (Evaline Feller)  Requested for: 34UYJ8702; Last   Rx:27Jan2017 Ordered   5  Latuda 120 MG Oral Tablet; TAKE 1 TABLET IN THE EVENING WITH FOOD; Therapy: 82Bxq9665 to (Evaline Feller)  Requested for: 50IKC3732; Last   Rx:27Jan2017; Status: ACTIVE - Renewal Denied Ordered    6  Lithium Carbonate  MG Oral Tablet Extended Release; Take 2 and 1/2 tablets at   bedtime; Therapy: 95PFH7326 to (April Quiros)  Requested for: 14WMV7043; Last   Rx:03Mar2017 Ordered    7  Omeprazole 20 MG Oral Capsule Delayed Release; take 1 capsule daily as needed; Therapy: 59SZJ5014 to ([id number])  Requested for: 64Lvb3004; Last   Rx:27Feb2017 Ordered    8   HydrOXYzine HCl - 50 MG Oral Tablet; Take 1 1/2-2 tabs po qhs prn insomnia; Therapy: 68VFS4047 to (Evaluate:24Jun2017)  Requested for: 34RUZ9834; Last   Rx:25May2017 Ordered   9  LORazepam 0 5 MG Oral Tablet; Take 1 tablet 3 times daily  as needed; Therapy: 47Gdy6671 to (Evaluate:67Cvn6178); Last Rx:27Jan2017 Ordered    10  Lisinopril 10 MG Oral Tablet; TAKE 1 TABLET BY MOUTH EVERY DAY FOR BLOOD    PRESSURE; Therapy: 44SLJ4931 to (Evaluate:84Gze2827)  Requested for: 90ELE4174; Last    Rx:00Kji6903 Ordered    11  Levothyroxine Sodium 100 MCG Oral Tablet (Synthroid); take 1 tablet by mouth every day; Therapy: 20BFT4566 to (Evaluate:72Ljj3621)  Requested for: 69UAR7913; Last    Rx:09Nov2016 Ordered    12  Accu-Chek Martha Device; test 4 times daily; Therapy: 16RRU4396 to (Last Rx:98Fhm5252)  Requested for: 78CDD9493 Ordered   13  Accu-Chek Martha STRP; TEST 4 TIMES DAILY; Therapy: 27FRD7518 to (Evaluate:15May2016)  Requested for: 87VTI5478; Last    Rx:51Qfh9071 Ordered   14  Accu-Chek Soft Touch Lancets Miscellaneous; test blood sugar four times daily; Therapy: 46XUP9841 to (Evaluate:13Apr2016)  Requested for: 97XML3868; Last    Rx:99Mjw0015 Ordered    15  ROPINIRole HCl - 2 MG Oral Tablet; Take 1 tablet by mouth at bedtime; Therapy: 72Qlk9559 to (Evaluate:30Jun2017)  Requested for: 75PUO3035; Last    Rx:02Mar2017 Ordered    16  Myrbetriq 50 MG Oral Tablet Extended Release 24 Hour; Take one tablet daily; Therapy: 78IVE2418 to (Last Rx:29Mar2017)  Requested for: 29Mar2017 Ordered    17  Neurontin 300 MG Oral Capsule (Gabapentin); take 1 capsule daily;     Therapy: (Recorded:94Nse4588) to Recorded    Future Appointments    Date/Time Provider Specialty Site   06/12/2017 01:30 PM Tray Mcdonald RPA-SARA Psychiatry [place] [address]   05/30/2017 03:00 PM Nurse, Urology [address]   06/06/2017 03:00 PM Nurse, Urology [facility]   06/13/2017 02:45 PM Nurse, Urology [facility] UROLOGY Elodia Pauline   06/20/2017 02:45 PM Nurse, Urology Robert   [address] [facility]   06/27/2017 03:00 PM Nurse, Urology [facility]   07/06/2017 03:00 PM Nurse, Urology [facility]   07/11/2017 03:00 PM Nurse, Urology [address] [facility]   07/18/2017 03:00 PM Nurse, Urology Robert  [address] [facility]   07/25/2017 03:00 PM Nurse, Urology Robert  [address] [facility]   08/01/2017 03:00 PM Nurse, Urology Robert  [address] [facility]   05[facility]30[facility]2017 05:[facility]   06/01/2017 01:00 PM Bárbara Pineda, [facility]   06/06/2017 05:00 PM Bárbara Pineda, [facility]   06/13/2017 05:00 PM Bárbara Pineda, [facility]   06/15/2017 04:00 PM Bárbara Pineda, [facility]   06/20/2017 05:00 [facility]   06/22/2017 01:00 PM [address]ineda [address] [facility]   06/27/2017 05:00 [facility]   06/29/2017 01:00 PM [address]ineda [address] [facility]   07/06/2017 10:00 AM [address]ineda [address] [facility]   07/11/2017 05:00 PM [address]ineda [address] [facility]   07/13/2017 10:00 AM [address]ineda [address] [facility]   07/18/2017 05:00 PM Tawnya Hugo [facility]   07/20/2017 08:00 AM [address]ineda [address] [facility]   07/27/2017 10:00 AM [address]ineda [address] [facility]   06/26/2017 08:30 AM Ryan Block, [address] [facility] [address]     Signatures   Electronically signed by : SCOTTIE Salomon; May 25 2017 2:26PM EST                       (Author)    Electronically signed by : PAUL Kaur ; May 26 2017 11:44AM EST                       (Author)    Electronically signed by : PAUL Kaur ; May 26 2017 11:45AM EST                       (Author)

## 2018-01-12 NOTE — PSYCH
Date of Initial Treatment Plan: 7/31/2008  Date of Current Treatment Plan: 2/2/17  Treatment Plan 17  Strengths/Personal Resources for Self Care: Determined, intelligent, and gets along well with others  Diagnosis:   Axis I: ED, NOS, OCD, Bipolar D/O     Area of Needs: I have bee making compulsive purchases   I struggle with feeling like there will always be a list to get done  I still worry that I will never get better  Long Term Goals:   I want to decrease the frequency of acting out  Target Date: 6/2/17      I want to accept myself   Target Date: 6/2/17      I want to learn to live a more calm and less anxious life  Target Date: 6/2/17    Short Term Objectives:   Goal 1:   I will leave my mom's credit card off my paypal account  I will refrain from beating myself up if I relapse  Target Date: 5/2/17      Goal 2:   I will continue to practice asserting myself via email  I will continue to learn about trauma by attending group therapy  Target Date: 5/2/17      Goal 3:   I will improve my self care  I will email or go in person to talk to [facility]  I will continue to attend ED group  Target Date: 5/2/17      GOAL 1: Modality: Individual 1 x per month Target Date: 6/2/17   GOAL 1: Modality: Group 4 x per month Target Date: 6/2/17      The person(s) responsible for carrying out the plan is Moody Barton  GOAL 2: Modality: Individual 2 x per month Target Date: 6/2/17   GOAL 2: Modality: Group 4 x per month Target Date: 6/2/17      The person(s) responsible for carrying out the plan is Elizabeth  GOAL 3: Modality: Individual 2 x per month Target Date: 6/2/17   GOAL 3: Modality: Group 4 x per month Target Date: 6/2/17        The person(s) responsible for carrying out the plan is Elizabeth  The first scheduled review date is 6/2/17  The expected length of service is 6 mos           CLIENT COMMENTS / Please share your thoughts, feelings, need and/or experiences regarding your treatment plan: _____________________________________________________________________________________________________________________________________________________________________________________________________________________________________________________________________________________________________________________ Date/Time: ______________     Patient Signature: _________________________________ Date/Time: ______________        1  Abnormal weight gain (783 1) (R63 5)   2  Acute diverticulitis (562 11) (K57 92)   3  Acute pain (338 19) (R52)   4  Allergic rhinitis (477 9) (J30 9)   5  Amenorrhea (626 0) (N91 2)   6  Binge-eating disorder, severe (783 6) (F50 81)   7  Bipolar I disorder, most recent episode depressed, mild (296 51) (F31 31)   8  Bronchitis (490) (J40)   9  Bulging lumbar disc (722 10) (M51 26)   10  Cough (786 2) (R05)   11  Dizziness (780 4) (R42)   12  Dysfunctional uterine bleeding (626 8) (N93 8)   13  Dysuria (788 1) (R30 0)   14  Encounter for routine gynecological examination (V72 31) (Z01 419)   15  Esophageal reflux (530 81) (K21 9)   16  Foot pain (729 5) (M79 673)   17  EILEEN (generalized anxiety disorder) (300 02) (F41 1)   18  Gambling disorder, moderate (312 31) (F63 0)   19  Hypertension (401 9) (I10)   20  Hypothyroidism (244 9) (E03 9)   21  Long term current use of therapeutic drug (V58 69) (Z79 899)   22  Lumbago (724 2) (M54 5)   23  Lumbar degenerative disc disease (722 52) (M51 36)   24  Menorrhagia (626 2) (N92 0)   25  Morbid or severe obesity due to excess calories (278 01) (E66 01)   26  Obsessive-compulsive disorder (300 3) (F42 9)   27  Obstructive sleep apnea (327 23) (G47 33)   28  Otalgia, unspecified laterality (388 70) (H92 09)   29  Prediabetes (790 29) (R73 09)   30  RLS (restless legs syndrome) (333 94) (G25 81)   31  Urinary tract infection (599 0) (N39 0)   32  URTI (acute upper respiratory infection) (465 9) (J06 9)   33  Viral bronchitis (466 0) (J20 8)   34  Vitamin D deficiency (285 9) (S99 9)     Electronically signed by : Crystal Damico LCSW; Feb 2 2017  1:49PM EST                       (Author)

## 2018-01-13 VITALS
SYSTOLIC BLOOD PRESSURE: 146 MMHG | HEART RATE: 60 BPM | WEIGHT: 293 LBS | HEIGHT: 68 IN | BODY MASS INDEX: 44.41 KG/M2 | DIASTOLIC BLOOD PRESSURE: 70 MMHG

## 2018-01-13 VITALS — SYSTOLIC BLOOD PRESSURE: 128 MMHG | HEART RATE: 68 BPM | DIASTOLIC BLOOD PRESSURE: 74 MMHG

## 2018-01-13 VITALS — SYSTOLIC BLOOD PRESSURE: 132 MMHG | DIASTOLIC BLOOD PRESSURE: 74 MMHG | HEIGHT: 68 IN | HEART RATE: 60 BPM

## 2018-01-13 VITALS
OXYGEN SATURATION: 98 % | HEIGHT: 68 IN | DIASTOLIC BLOOD PRESSURE: 82 MMHG | TEMPERATURE: 96.8 F | SYSTOLIC BLOOD PRESSURE: 134 MMHG | HEART RATE: 78 BPM

## 2018-01-13 VITALS
HEART RATE: 86 BPM | WEIGHT: 293 LBS | HEIGHT: 68 IN | DIASTOLIC BLOOD PRESSURE: 70 MMHG | SYSTOLIC BLOOD PRESSURE: 110 MMHG | BODY MASS INDEX: 44.41 KG/M2

## 2018-01-13 VITALS
RESPIRATION RATE: 17 BRPM | TEMPERATURE: 98.6 F | WEIGHT: 293 LBS | BODY MASS INDEX: 44.41 KG/M2 | SYSTOLIC BLOOD PRESSURE: 124 MMHG | HEIGHT: 68 IN | HEART RATE: 70 BPM | OXYGEN SATURATION: 98 % | DIASTOLIC BLOOD PRESSURE: 84 MMHG

## 2018-01-13 VITALS — DIASTOLIC BLOOD PRESSURE: 80 MMHG | SYSTOLIC BLOOD PRESSURE: 133 MMHG | HEART RATE: 64 BPM | HEIGHT: 68 IN

## 2018-01-13 VITALS
DIASTOLIC BLOOD PRESSURE: 82 MMHG | HEIGHT: 68 IN | BODY MASS INDEX: 44.41 KG/M2 | HEART RATE: 78 BPM | SYSTOLIC BLOOD PRESSURE: 136 MMHG | TEMPERATURE: 98.6 F | OXYGEN SATURATION: 98 % | WEIGHT: 293 LBS

## 2018-01-13 NOTE — PSYCH
Progress Note  Psychotherapy Provided [facility]: Individual Psychotherapy 50 minutes provided today  Goals addressed in session:   1-3 D: Met with Elizabeth for Individual Therapy session  Elizabeth spoke today about her feelings re: her nighttime eating behaviors and attempts to decrease from 5 times per night by at least two times  She reported that she has now increased her online shopping, having even done so while driving  A: Elizabeth acknowledges that she does not believes that she can stop either her thoughts or her behaviors  She believes that even attempting to do so will overwhelm her  These thoughts / beliefs will continue to be challenged  P: Upcoming sessions will be used to continue to address the above  She will continue to attend both weekly Trauma and ED group therapy sessions  Pain Scale and Suicide Risk [facility]: Current Pain Assessment: moderate to severe   On a scale of 0 to 10, the patient rates current pain at 3   Current suicide risk is low   Behavioral Health Treatment Plan [facility]: Diagnosis and Treatment Plan explained to patient, patient relates understanding diagnosis and is agreeable to Treatment Plan  Assessment    1  Binge-eating disorder, severe (783 6) (F50 81)   2  EILEEN (generalized anxiety disorder) (300 02) (F41 1)   3  Gambling disorder, moderate (312 31) (F63 0)   4  Obsessive-compulsive disorder (300 3) (F42 9)   5   Severe bipolar I disorder, most recent episode depressed (296 53) (F31 4)    Signatures   Electronically signed by : Lila Prince LCSW; Oct  6 2017 11:00AM EST                       (Author)
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
## 2018-01-13 NOTE — PSYCH
Psych Med Mgmt    Appearance: was calm and cooperative, adequate hygiene and grooming, restless and fidgety and good eye contact  Observed mood: was dysphoric and anxious  Observed mood: affect was constricted  Speech: pressured, but speech soft  Thought processes: coherent/organized  Hallucinations: no hallucinations present  Thought Content: no delusions  Abnormal Thoughts: The patient has obsessive thought content, but no suicidal thoughts and no homicidal thoughts  Orientation: The patient is oriented to person, place and time  Recent and Remote Memory: short term memory intact and long term memory intact  Attention Span And Concentration: concentration impaired  Insight: Insight intact  Judgment: Her judgment was intact  Muscle Strength And Tone  Muscle strength and tone were normal  Normal gait and station  Language: no difficulty naming common objects, no difficulty repeating a phrase and no difficulty writing a sentence  Fund of knowledge: Patient displays adequate knowledge of current events, adequate fund of knowledge regarding past history and adequate fund of knowledge regarding vocabulary  The patient is experiencing no localized pain  Treatment Recommendations: Continue Lithium 900 mg at bedtime  Continue Ativan 0 5 mg tid PRN  Increase Latuda to 120 mg in the evening  Continue Lamictal 400 mg at bedtime  Continue Prozac 20 mg daily  Continue therapy with [facility]; also attends eating disorders group  Follows with PCP for glucose and lipid monitoring  Risks, Benefits And Possible Side Effects Of Medications: Risks, benefits, and possible side effects of medications explained to patient and patient verbalizes understanding, Risks of medications explained if female patient  Patient verbalizes understanding and agrees to notify her doctor if she becomes pregnant       Discussed with patient Black Box warning on concurrent use of benzodiazepines and opioid medications including sedation, respiratory depression, coma and death  Patient understands the risk of treatment with benzodiazepines in addition to opioids and wants to continue taking those medications  Discussed with patient the risks of sedation, respiratory depression, impairment of ability to drive and potential for abuse and addiction related to treatment with benzodiazepine medications  The patient understands risk of treatment with benzodiazepine medications, agrees to not drive if feels impaired and agrees to take medications as prescribed  The patient has been filling controlled prescriptions on time as prescribed to [employer] program       She reports decreased appetite, increased energy, recent 2 lbs weight loss and decrease in number of sleep hours 6  Eloise Lindsey states she has been feeling more anxious recently  Also feels she has more obsessive thoughts recently, has been rearranging her room and cleaning a lot "I feel I have to do it"  Also has some mood swings on and off  No suicidal or homicidal ideation  No psychotic symptoms  Sleep still fluctuates  Appetite is increased  No side effects from medications reported  No rash  Labs reviewed: Lithium level is therapeutic at 0 6 on 8/22/2016  Creatinine is normal         Vitals  Signs   Recorded: 75QWT3566 35:66CX   Systolic: 086  Diastolic: 81  Heart Rate: 63  Height: 5 ft 8 in  Weight: 368 lb   BMI Calculated: 55 95  BSA Calculated: 2 65  BP Cuff Size: Extra-Large    Assessment    1  Bipolar I disorder, most recent episode mixed, moderate (296 62) (F31 62)   2  EILEEN (generalized anxiety disorder) (300 02) (F41 1)   3  Obsessive-compulsive disorder (300 3) (F42 9)   4  Binge-eating disorder, severe (783 6) (F50 81)    Plan    1  Follow-up Visit in 4 Weeks Evaluation and Treatment  Follow-up  Status: Hold For -   Scheduling  Requested for: 62FMM6402    2   From  Latuda 80 MG Oral Tablet TAKE 1 TABLET IN THE EVENING WITH   DINNER To Latuda 120 MG Oral Tablet TAKE 1 TABLET IN THE EVENING WITH FOOD   3  LamoTRIgine 200 MG Oral Tablet; TAKE 2 TABLETS AT BEDTIME   4  Lithium Carbonate  MG Oral Tablet Extended Release; TAKE 2 TABLETS   AT BEDTIME    5  FLUoxetine HCl - 20 MG Oral Capsule; TAKE 1 CAPSULE DAILY    6  LORazepam 0 5 MG Oral Tablet; Take 1 tablet 3 times daily  as needed    Review of Systems    Constitutional: feeling tired and recent 2 lb weight loss  Cardiovascular: no complaints of slow or fast heart rate, no chest pain, no palpitations  Respiratory: no complaints of shortness of breath, no wheezing, no dyspnea on exertion  Gastrointestinal: no complaints of abdominal pain, no constipation, no nausea, no diarrhea, no vomiting  Genitourinary: no complaints of dysuria, no incontinence, no pelvic pain, no urinary frequency  Musculoskeletal: no complaints of arthralgia, no myalgias, no limb pain, no joint stiffness  Integumentary: no complaints of skin rash, no itching, no dry skin  Neurological: no complaints of headache, no confusion, no numbness, no dizziness  Past Psychiatric History    Past Psychiatric History: Multiple past inpatient psychiatric admissions  One past suicide attempt by overdose in 2009  Substance Abuse Hx    Substance Abuse History: No drug or alcohol use  Active Problems    1  Abnormal weight gain (783 1) (R63 5)   2  Acute diverticulitis (562 11) (K57 92)   3  Acute pain (338 19) (R52)   4  Allergic rhinitis (477 9) (J30 9)   5  Amenorrhea (626 0) (N91 2)   6  Binge-eating disorder, severe (783 6) (F50 81)   7  Bulging lumbar disc (722 10) (M51 26)   8  Dizziness (780 4) (R42)   9  Dysfunctional uterine bleeding (626 8) (N93 8)   10  Dysuria (788 1) (R30 0)   11  Encounter for routine gynecological examination (V72 31) (Z01 419)   12  Esophageal reflux (530 81) (K21 9)   13  Foot pain (729 5) (M79 673)   14   EILEEN (generalized anxiety disorder) (300 02) (F41 1)   15  Gambling disorder, moderate (312 31) (F63 0)   16  Hypertension (401 9) (I10)   17  Hypothyroidism (244 9) (E03 9)   18  Long term current use of therapeutic drug (V58 69) (Z79 899)   19  Lumbago (724 2) (M54 5)   20  Lumbar degenerative disc disease (722 52) (M51 36)   21  Menorrhagia (626 2) (N92 0)   22  Morbid or severe obesity due to excess calories (278 01) (E66 01)   23  Obsessive-compulsive disorder (300 3) (F42 9)   24  Obstructive sleep apnea (327 23) (G47 33)   25  Otalgia, unspecified laterality (388 70) (H92 09)   26  Prediabetes (790 29) (R73 09)   27  RLS (restless legs syndrome) (333 94) (G25 81)   28  Urinary tract infection (599 0) (N39 0)   29  URTI (acute upper respiratory infection) (465 9) (J06 9)   30  Viral bronchitis (466 0) (J20 8)   31  Vitamin D deficiency (268 9) (E55 9)    Past Medical History    1  History of Abscess of face (682 0) (L02 01)   2  History of Acute nonsuppurative otitis media, unspecified laterality (381 00) (H65 199)   3  History of Backache (724 5) (M54 9)   4  History of Candidiasis, cutaneous (112 3) (B37 2)   5  History of Dog bite (879 8,E906 0) (W54  0XXA)   6  History of Dysfunction of left eustachian tube (381 81) (H69 82)   7  History of acute sinusitis (V12 69) (Z87 09)   8  Denied: History of head injury   9  History of Pseudotumor cerebri (348 2) (G93 2)   10  Denied: History of Seizures   11  History of Suicide and self-inflicted injury (E872 2) (C16 2IKN)    The active problems and past medical history were reviewed and updated today  Allergies    1  No Known Drug Allergies    Current Meds   1  Accu-Chek Martha Device; test 4 times daily; Therapy: 22MIU4301 to (Last Rx:32Uem9353)  Requested for: 93ZGK3729 Ordered   2  Accu-Chek Martha In Vitro Strip; TEST 4 TIMES DAILY; Therapy: 01MCU2101 to (Evaluate:42Srx7943)  Requested for: 31SJY1603; Last   Rx:52Fwr9024 Ordered   3   Accu-Chek Soft Touch Lancets Miscellaneous; test blood sugar four times daily; Therapy: 65DBS5633 to (Evaluate:13Apr2016)  Requested for: 69HEM5574; Last   Rx:27Owm0367 Ordered   4  Ciprofloxacin HCl - 500 MG Oral Tablet; TAKE 1 TABLET EVERY 12 HOURS DAILY; Therapy: 38WTQ7971 to (Evaluate:84Llm5410)  Requested for: 42BHQ7635; Last   Rx:28Qae4664 Ordered   5  FLUoxetine HCl - 20 MG Oral Capsule; TAKE 1 CAPSULE DAILY; Therapy: 17WSU9338 to (22 829038)  Requested for: 39BDJ9634; Last   Rx:08Jun2016 Ordered   6  Hydrocodone-Acetaminophen 5-325 MG Oral Tablet; TAKE 1 TABLET 3 times daily PRN   as needed for pain; Therapy: 69GUX3327 to (Evaluate:28Nov2014); Last Rx:31Oct2014 Ordered   7  LamoTRIgine 200 MG Oral Tablet; TAKE 2 TABLETS AT BEDTIME; Therapy: 25FYQ9693 to (22 829038)  Requested for: 54APO1372; Last   Rx:08Jun2016 Ordered   8  Lansoprazole 15 MG Oral Capsule Delayed Release; take 1 capsule daily; Last   Rx:90Ezo0079 Ordered   9  Latuda 80 MG Oral Tablet; TAKE 1 TABLET IN THE EVENING WITH DINNER; Therapy: 21Apr2016 to (Evaluate:26Nov2016)  Requested for: 71Fmi9689; Last   Rx:19Xtl1591 Ordered   10  Levocetirizine Dihydrochloride 5 MG Oral Tablet; TAKE 1 TABLET BY MOUTH ONCE A    DAY; Therapy: 43NKH8098 to (Evaluate:27Ygx2712)  Requested for: 64Tcx9803; Last    Rx:16Kjw2954 Ordered   11  Levothyroxine Sodium 100 MCG Oral Tablet; take 1 tablet by mouth every day; Therapy: 14SSE4505 to ([facility])  Requested for: 73EZT3775; Last    Rx:29Jan2016 Ordered   12  Lisinopril 10 MG Oral Tablet; TAKE 1 TABLET BY MOUTH EVERY DAY FOR BLOOD    PRESSURE; Therapy: 92GFP6753 to (Evaluate:21Oct2016)  Requested for: 54PDZ3909; Last    WS:95KAR5634 Ordered   13  Lithium Carbonate  MG Oral Tablet Extended Release; TAKE 2 TABLETS AT    BEDTIME; Therapy: 98TWI6555 to (43 205931)  Requested for: 39TPO8256; Last    Rx:08Jun2016 Ordered   14  LORazepam 0 5 MG Oral Tablet; Take 1 tablet 3 times daily  as needed;     Therapy: 46ONW8694 to (Evaluate:06Oct2016); Last Rx:08Jun2016 Ordered   15  MetFORMIN HCl  MG Oral Tablet Extended Release 24 Hour; TAKE 1 TABLET BY    MOUTH EVERY DAY WITH SUPPER; Therapy: 91FMX6766 to (Evaluate:01Mar2017)  Requested for: 52Ebg1403; Last    Rx:58Qgg6384 Ordered   16  Omeprazole 20 MG Oral Capsule Delayed Release; TAKE ONE CAPSULE BY MOUTH    TWICE A DAY; Therapy: 22SRW1236 to (Marsha Nair)  Requested for: 99Nlf4786; Last    Rx:55Lto7587 Ordered   17  Proventil  (90 Base) MCG/ACT Inhalation Aerosol Solution; 2 PUFFS Q 4 HRS; Therapy: 94EUJ6941 to (Last IK:75QDB6951)  Requested for: 11VXK2629 Ordered   18  ROPINIRole HCl - 0 25 MG Oral Tablet; TAKE 2 TABLET Bedtime; Therapy: 50Vaf0324 to (Wilber Webb)  Requested for: 62Rxk7949; Last    Rx:75Myc6760 Ordered    The medication list was reviewed and updated today  Family Psych History  Mother    1  Family history of bicuspid aortic valve (V17 49) (Z82 79)   2  Family history of depression (V17 0) (Z81 8)   3  Family history of Hypertension (V17 49)   4  Family history of Status post aortic valve replacement  Father    5  Family history of Hypertension (V17 49)   6  Family history of Hypothyroidism   7  No family history of mental disorder  Brother    8  Family history of Hypertension (V17 49)  Maternal Grandmother    9  Family history of Cancer   10  Family history of cerebrovascular accident (V17 1) (Z82 3)   11  Family history of Heart Disease (V17 49)  Paternal Grandmother    15  Family history of Cancer   13  Family history of malignant neoplasm (V16 9) (Z80 9)  Maternal Grandfather    15  Family history of pneumonia (V18 8) (Z83 1)  Paternal Grandfather    13  Family history of pneumonia (V18 8) (Z83 1)  Family History    12  Family history of Arthritis (716 90) (M19 90)   17  Family history of Breast Cancer (V16 3)   18  Family history of osteoporosis (V17 81) (Z82 62)   19   Family history of Hypertension (V17 49)   20  Family history of Transient Ischemic Attack    The family history was reviewed and updated today  Social History    · Caffeine Use   · Currently sexually active   · Disabled   · Former smoker (Y84 76) (Q10 667)   · High school graduate   · Lives with parents   · Never smoked   · No alcohol use   · Tea  The social history was reviewed and updated today  The social history was reviewed and is unchanged  Single, lives with mother and stepfather  No children  Some college  On disability  Worked in past in retail  No history of legal problems  No  history  History Of Phys/Sex Abuse Or Perpetration    History Of Phys/Sex Abuse or Perpetration: No history of physical or sexual abuse  End of Encounter Meds    1  Levocetirizine Dihydrochloride 5 MG Oral Tablet; TAKE 1 TABLET BY MOUTH ONCE A   DAY; Therapy: 62Tit9083 to (Evaluate:24Rzp9084)  Requested for: 17Dyn5227; Last   Rx:02Sep2016 Ordered    2  LamoTRIgine 200 MG Oral Tablet; TAKE 2 TABLETS AT BEDTIME; Therapy: 85XIU5385 to (ICLWLGSM:85NGP6312)  Requested for: 18YJN6000; Last   Rx:03Oct2016 Ordered   3  Latuda 120 MG Oral Tablet; TAKE 1 TABLET IN THE EVENING WITH FOOD; Therapy: 60Rax6106 to (Evaluate:31Jan2017)  Requested for: 76KJL7073; Last   Rx:76Ivb2399 Ordered   4  Lithium Carbonate  MG Oral Tablet Extended Release; TAKE 2 TABLETS AT   BEDTIME; Therapy: 78MJZ4362 to (Evaluate:31Jan2017)  Requested for: 02JIU7787; Last   Rx:06Nkn6662 Ordered    5  FLUoxetine HCl - 20 MG Oral Capsule; TAKE 1 CAPSULE DAILY; Therapy: 53SCE1634 to (XKWOFGYO:39YPV9966)  Requested for: 54UTT6417; Last   Rx:11Ylt5509 Ordered    6  Ciprofloxacin HCl - 500 MG Oral Tablet; TAKE 1 TABLET EVERY 12 HOURS DAILY; Therapy: 97FNQ4376 to (Evaluate:90Zhk0022)  Requested for: 73JGS8499; Last   Rx:09Onn1542 Ordered    7  Lansoprazole 15 MG Oral Capsule Delayed Release (Prevacid); take 1 capsule daily; Last Rx:05Vzb5123 Ordered   8  Omeprazole 20 MG Oral Capsule Delayed Release; TAKE ONE CAPSULE BY MOUTH   TWICE A DAY; Therapy: 29WBM0879 to (Adrianneari Zoraida)  Requested for: 48Fry2477; Last   Rx:31Ydt4201 Ordered    9  LORazepam 0 5 MG Oral Tablet; Take 1 tablet 3 times daily  as needed; Therapy: 02Iek9686 to (Evaluate:31Jan2017); Last Rx:03Oct2016 Ordered    10  Lisinopril 10 MG Oral Tablet; TAKE 1 TABLET BY MOUTH EVERY DAY FOR BLOOD    PRESSURE; Therapy: 96PDF1288 to (Evaluate:21Oct2016)  Requested for: 40WAN6552; Last    WL:48KCA8640 Ordered    11  Levothyroxine Sodium 100 MCG Oral Tablet (Synthroid); take 1 tablet by mouth every day; Therapy: 50YJX8833 to (Cyndi Wilson)  Requested for: 50MWQ0473; Last    Rx:29Jan2016 Ordered    12  Hydrocodone-Acetaminophen 5-325 MG Oral Tablet; TAKE 1 TABLET 3 times daily PRN    as needed for pain; Therapy: 14NTB2586 to (Evaluate:28Nov2014); Last Rx:31Oct2014 Ordered    13  Accu-Chek Martha Device; test 4 times daily; Therapy: 24GGE4250 to (Last Rx:20Bbh1544)  Requested for: 84AZZ9715 Ordered   14  Accu-Chek Martha In Vitro Strip; TEST 4 TIMES DAILY; Therapy: 11KAV2898 to (Evaluate:69Zky2260)  Requested for: 07SCF8374; Last    Rx:51Fsd2388 Ordered   15  Accu-Chek Soft Touch Lancets Miscellaneous; test blood sugar four times daily; Therapy: 94DNI9249 to (Evaluate:13Apr2016)  Requested for: 13CRK2228; Last    Rx:38Lfa8537 Ordered   16  MetFORMIN HCl  MG Oral Tablet Extended Release 24 Hour; TAKE 1 TABLET BY    MOUTH EVERY DAY WITH SUPPER; Therapy: 84YXB0542 to (Alpesh Walls)  Requested for: 41Hpf1876; Last    Rx:08Spg5285 Ordered    17  ROPINIRole HCl - 0 25 MG Oral Tablet; TAKE 2 TABLET Bedtime; Therapy: 59Xcf7304 to (Ned Atkinson)  Requested for: 96Svb0686; Last    Rx:39Ejm9586 Ordered    18  Proventil  (90 Base) MCG/ACT Inhalation Aerosol Solution; 2 PUFFS Q 4 HRS;     Therapy: 39OKA7866 to (Last VF:17BRR8201)  Requested for: 17EGJ8767 Ordered    Future Appointments    Date/Time Provider Specialty Site   11/18/2016 03:00 PM PAUL Ballesteros   Psychiatry [facility]   10/04/2016 05:00 PM [place], [address] [facility]   10/11/2016 05:00 PM Tammi Pinedaon [place], [place]   10/13/2016 01:00 PM Edwin Coon [place], [address] [facility]   10/18/2016 05:00 PM Tammi Pinedaon [place], [place]   10/25/2016 05:00 PM Edwin Coon [place], [address] [facility]   10/28/2016 11:00 AM Tammi Pinedaon [place], [address] [facility]   11/01/2016 05:00 PM Edwin Coon [place], [address] [facility]   11/08/2016 05:00 PM Bárbara Honeycutt [place]   11/11/2016 11:00 AM Edwin Coon [place], [address] [facility]   11/22/2016 05:00 PM Bárbara Honeycutt, [place]   12/01/2016 01:00 PM Mary Pineda [facility]   12/06/2016 05:00 PM Bárbara Pineda, [place]   12/20/2016 05:00 PM Bárbara Honeycutt [place]     Signatures   Electronically signed by : PAUL Vivar ; Oct  3 2016  4:34PM EST                       (Author)

## 2018-01-13 NOTE — PSYCH
Progress Note  Psychotherapy Provided [facility]: Group Therapy provided today  Goals addressed in session:   1 D: Pt attended Trauma Group  Pts engaged in a discussion re: the difference between aggressive and assertive communication  Reasons why being assertive is compounded by trauma were processed, as well  A: Pt  openly shared today on this topic and acknowledged that she is beginning to make attempts in her life to increase her use of assertive communication  P: Pt will continue to be seen for individual and group therapy during which the above will be further processed  Pain Scale and Suicide Risk [facility]: On a scale of 0 to 10, the patient rates current pain at 5   Behavioral Health Treatment Plan [address] [place] [address] [address]: Diagnosis and Treatment Plan explained to patient, patient relates understanding diagnosis and is agreeable to Treatment Plan  Assessment    1   EILEEN (generalized anxiety disorder) (300 02) (F41 1)    Signatures   Electronically signed by : Tammy Montelongo LCSW; May  4 2017 12:03PM EST                       (Author)

## 2018-01-13 NOTE — PSYCH
Progress Note  Psychotherapy Provided [facility]: Group Therapy provided today  Goals addressed in session:   1 D: The above was seen for Group Therapy session  Group members engaged in a therapeutic discussion re: the characteristics of a healthy relationship  A:Elizabeth was withdrawn today and appeared to have much on her mind that she chose not to share  She was not able to assert herself and was therefore not supported by other group members today  P: She will be encouraged to continue to attend individual therapy weekly and the Adult ED group on a biweekly basis  Pain Scale and Suicide Risk [facility]: On a scale of 0 to 10, the patient rates current pain at 4   Current suicide risk is low   Behavioral Health Treatment Plan [address] [place] [address] [address]: Diagnosis and Treatment Plan explained to patient, patient relates understanding diagnosis and is agreeable to Treatment Plan  Assessment    1   Binge-eating disorder, severe (293 6) (P01 17)    Signatures   Electronically signed by : Harper Norton LCSW; Sep 14 2017  2:46PM EST                       (Author)

## 2018-01-13 NOTE — PSYCH
Progress Note  Psychotherapy Provided [facility]: Group Therapy provided today  Goals addressed in session:   1 D: Pt attended Trauma Group  Pts engaged in a discussion re: how their ability to trust / manage their emotions has been damaged by their trauma histories  A: Pt was distracted today during group and struggled to participate in discussion  P: Pt will continue to be seen for individual and group therapy during which the above will be further processed  Pain Scale and Suicide Risk [facility]: On a scale of 0 to 10, the patient rates current pain at 4   Behavioral Health Treatment Plan [facility]: Diagnosis and Treatment Plan explained to patient, patient relates understanding diagnosis and is agreeable to Treatment Plan  Assessment    1   EILEEN (generalized anxiety disorder) (300 02) (F41 1)    Signatures   Electronically signed by : Calli Garcia LCSW; Apr 20 2017  2:47PM EST                       (Author)

## 2018-01-13 NOTE — PSYCH
Progress Note  Psychotherapy Provided [facility]: Individual Psychotherapy 50 minutes provided today  Goals addressed in session:   1-3 D: Met with Elizabeth for Individual Therapy session  Elizabeth spoke today about her feelings re: her move in with a peer and the resulting decrease in OCD since  A: Elizabeth presented as extremely calm today  She believes her change in venue and [address] [facility] treatments have contributed to her ability to fight / manage her OCD  P: Upcoming sessions will be used to continue to address the above  She will continue to attend both weekly Trauma and ED group therapy sessions  Pain Scale and Suicide Risk [facility]: Current Pain Assessment: moderate to severe   On a scale of 0 to 10, the patient rates current pain at 7   Current suicide risk is low   Behavioral Health Treatment Plan [address] [place] [address] [address]: Diagnosis and Treatment Plan explained to patient, patient relates understanding diagnosis and is agreeable to Treatment Plan  Assessment    1  Binge-eating disorder, severe (783 6) (F50 81)   2  EILEEN (generalized anxiety disorder) (300 02) (F41 1)   3  Gambling disorder, moderate (312 31) (F63 0)   4  Obsessive-compulsive disorder (300 3) (F42 9)   5  PTSD (post-traumatic stress disorder) (309 81) (F43 10)   6   Severe bipolar I disorder, most recent episode depressed (296 53) (F31 4)    Signatures   Electronically signed by : Robi Serrano LCSW; Nov 9 2017 12:17PM EST                       (Author)    Electronically signed by : Robi Serrano LCSW; Nov 9 2017 12:17PM EST                       (Author)

## 2018-01-13 NOTE — PSYCH
Progress Note  Psychotherapy Provided [facility]: Group Therapy provided today  Goals addressed in session:   1 D: Above individual attended Trauma Group  Pts engaged in a discussion re: management of trauma responses in recent daily life situations  A: Elizabeth participated with prompting in today's discussion while also supporting her peers in doing so  P: She will continue to be seen for individual and group therapy during which the above will be further processed  Behavioral Health Treatment Plan [address] [place] [address] [address]: Diagnosis and Treatment Plan explained to patient, patient relates understanding diagnosis and is agreeable to Treatment Plan  Assessment    1   EILEEN (generalized anxiety disorder) (300 02) (F41 1)    Signatures   Electronically signed by : John Carter LCSW; Aug 11 2017 11:32AM EST                       (Author)

## 2018-01-13 NOTE — PSYCH
Progress Note  Psychotherapy Provided [facility]: Family Therapy provided today  Goals addressed in session:   1-3 D: Met with pt for and mom for Family Therapy session  Elizabeth and mom spoke today about their relationship, conflicts at home over car use and concerns for each other's wellbeing  A: Elizabeth did a very good job expressing herself and allowing her mother to vent today during her session  By doing so, her mother was also able to accept feedback  P: Upcoming sessions will be used to continue to address the above  She will continue to attend both weekly Trauma and ED group therapy sessions  Pain Scale and Suicide Risk [facility]: Current Pain Assessment: moderate to severe   On a scale of 0 to 10, the patient rates current pain at 6   Behavioral Health Treatment Plan [facility]: Diagnosis and Treatment Plan explained to patient, patient relates understanding diagnosis and is agreeable to Treatment Plan  Assessment    1  Binge-eating disorder, severe (783 6) (F50 81)   2  Bipolar 1 disorder, depressed, moderate (296 52) (F31 32)   3  EILEEN (generalized anxiety disorder) (300 02) (F41 1)   4   Obsessive-compulsive disorder (300 3) (F42 9)    Signatures   Electronically signed by : Robi Serrano LCSW; Jun 19 2017 11:10AM EST                       (Author)

## 2018-01-13 NOTE — PSYCH
Progress Note  Psychotherapy Provided [facility]: Group Therapy provided today  Goals addressed in session:   1 D: Above individual attended Trauma Group  Pts engaged in two activities re: ways to decrease anxiety /trauma responses  A: Elizabeth actively participated in today's activities while supporting her peers while doing so  P: She will continue to be seen for individual and group therapy during which the above will be further processed  Pain Scale and Suicide Risk [facility]: On a scale of 0 to 10, the patient rates current pain at 5   Current suicide risk is low   Behavioral Health Treatment Plan [address] [place] [address] [address]: Diagnosis and Treatment Plan explained to patient, patient relates understanding diagnosis and is agreeable to Treatment Plan  Assessment    1   EILEEN (generalized anxiety disorder) (300 02) (F41 1)    Signatures   Electronically signed by : Nael Elena, BRYNN; Jul 13 2017  2:36PM EST                       (Author)

## 2018-01-13 NOTE — PSYCH
Progress Note  Psychotherapy Provided [facility]: Individual Psychotherapy 50 minutes and Telephone Contact provided today  Goals addressed in session:   1: Pt was seen today for Individual Therapy session  Pt spoke about her feelings and thoughts re: her ED very openly with this worker today  She explained the obsessive / compulsive ritual she experiences in a very concrete way that she has not done during prior sessions  A: [facility] continues to divulge more about her struggles and pain during therapy sessions  She does not appear to be able to stop the thoughts until she "gives in" to them  P:Elizabeth will continue to attend Individual and Group therapy on a weekly basis  Pain Scale and Suicide Risk [facility]: On a scale of 0 to 10, the patient rates current pain at 3   Current suicide risk is low   Behavioral Health Treatment Plan [facility]: Diagnosis and Treatment Plan explained to patient, patient relates understanding diagnosis and is agreeable to Treatment Plan  Assessment    1  Bipolar I disorder, most recent episode depressed, mild (296 51) (F31 31)   2  Eating disorder, unspecified (307 50) (F50 9)   3   Obsessive-compulsive disorder (300 3) (F42)    Signatures   Electronically signed by : Jarrod Sanchez LCSW; Aug  8 2016 11:27AM EST                       (Author)

## 2018-01-13 NOTE — PSYCH
Progress Note  Psychotherapy Provided [facility]: Individual Psychotherapy 50 minutes provided today  Goals addressed in session:   1-3 D: Met with pt for Individual Therapy session  Elizabeth spoke today about her feelings re: the homework she completed since her last session  The stages of change model was reviewed with her and her ED struggles were discussed  A: Elizabeth was once again extremely open and insightful as she processed the above  She was again willing to discuss her desire to stop gambling, as well      P: Upcoming sessions will be used to continue to address the above  She will continue to attend both weekly Trauma and ED group therapy sessions  Pain Scale and Suicide Risk [facility]: Current Pain Assessment: moderate to severe   On a scale of 0 to 10, the patient rates current pain at 4   Behavioral Health Treatment Plan [facility]: Diagnosis and Treatment Plan explained to patient, patient relates understanding diagnosis and is agreeable to Treatment Plan  Assessment    1  Binge-eating disorder, severe (783 6) (F50 81)   2  Bipolar I disorder, most recent episode mixed, moderate (296 62) (F31 62)   3  EILEEN (generalized anxiety disorder) (300 02) (F41 1)   4  Gambling disorder, moderate (312 31) (F63 0)   5   Obsessive-compulsive disorder (300 3) (F42 9)    Signatures   Electronically signed by : Pending sale to [facility]; Nov 10 2016  9:58AM EST                       (Author)

## 2018-01-13 NOTE — PSYCH
Progress Note  Psychotherapy Provided [facility]: Individual Psychotherapy 50 minutes provided today  Goals addressed in session:   1-3 D: Met with Elizabeth for Individual Therapy session  Elizabeth spoke today about her feelings re: her treatment with Dr Preethi Walter including today's [facility] [place] session  Her last intimate relationship was also discussed  A: Elizabeth also spoke about her childhood relationship with her brother and recent realizations she has had about him  She acknowledges feeling very alone as a child  P: Upcoming sessions will be used to continue to address the above  She will continue to attend both weekly Trauma and ED group therapy sessions  Pain Scale and Suicide Risk  [facility]: Current Pain Assessment: moderate to severe   On a scale of 0 to 10, the patient rates current pain at 3   Current suicide risk is low   Behavioral Health Treatment Plan [address] [place] [address] [address]: Diagnosis and Treatment Plan explained to patient, patient relates understanding diagnosis and is agreeable to Treatment Plan  Assessment    1  Binge-eating disorder, severe (783 6) (F50 81)   2  EILEEN (generalized anxiety disorder) (300 02) (F41 1)   3  Gambling disorder, moderate (312 31) (F63 0)   4  Obsessive-compulsive disorder (300 3) (F42 9)   5  PTSD (post-traumatic stress disorder) (309 81) (F43 10)   6   Severe bipolar I disorder, most recent episode depressed (296 53) (F31 4)    Signatures   Electronically signed by : Kosta Barahona LCSW; Oct 19 2017  3:23PM EST                       (Author)

## 2018-01-13 NOTE — MISCELLANEOUS
[address] spoke to me today to tell me that Brooke Kim is awakening in the night due to nocturia, for which she is being followed by Urologist  Per Basilia Alanis Pt is taking 2-3 tabs of Benadryl to help fall asleep again upon awakening at night  I contacted Brooke Kim on home phone # of record and she stated when she gets up to urinate, she become anxious with obsessive thoughts of "I need to do something " This then prevents her from falling back to sleep  Pt states she discussed with Basilia Alanis to take Benadryl and she suggested Pt take up to 2 tabs, but Pt then wound up deciding to take 3 tabs  Pt states the urologist provided a medication for the nocturia but she cannot start it until next Tuesday  I discussed options including the risks, benefits and SE of Hydroxyzine 50mg (1/2-1) tab po qhs prn insomnia  Pt agreed to take this instead of the Benadryl  I made a point to emphasize that she is not to take Benadryl while taking the Hydroxyzine  Pt verbalized understanding and acceptance of the plan  She also requests renewal of Sertraline given that her next appt is 5/25/2017  I e-scribed the following Rxs to the designated [facility] pharmacy:  Hydroxyzine 50mg (1/2-1) tab po qhs prn insomnia # 30  Sertraline 25mg (1) tab po qd # 30        Plan  Binge-eating disorder, severe, EILEEN (generalized anxiety disorder)    · Sertraline HCl - 25 MG Oral Tablet; take 1 tab po QD  EILEEN (generalized anxiety disorder)    · HydrOXYzine HCl - 50 MG Oral Tablet;  Take 1/2-1 tab po qhs prn insomnia    Signatures   Electronically signed by : SCOTTIE Nogueira; May 12 2017  6:48PM EST                       (Author)

## 2018-01-13 NOTE — PSYCH
Progress Note  Psychotherapy Provided [facility]: Group Therapy provided today  Goals addressed in session:   1 D: PT  was seen for Group Therapy session  Pts engaged in a therapeutic discussion re: who / what they "give their power to and why "   A: Elizabeth was open in sharing with the group re: the above  She spoke about ED as well as the suicidality that she has been struggling with  P: Pt will be encouraged to continue to attend individual therapy weekly and the Adult ED group on a biweekly basis  Pain Scale and Suicide Risk [facility]: On a scale of 0 to 10, the patient rates current pain at 5   Current suicide risk is low   Behavioral Health Treatment Plan [employer]: Diagnosis and Treatment Plan explained to patient, patient relates understanding diagnosis and is agreeable to Treatment Plan  Assessment    1   Binge-eating disorder, severe (503 6) (M92 53)    Signatures   Electronically signed by : Lila Prince LCSW; Jun 23 2017 12:51PM EST                       (Author)

## 2018-01-13 NOTE — PSYCH
Progress Note  Psychotherapy Provided [facility]: Individual Psychotherapy 50 minutes provided today  Goals addressed in session:   1-3 D: Met with pt for Individual Therapy session  Elizabeth spoke today about her feelings re: her mother's destabilization over the past week and attempts to blame Elizabeth  This was processed at length  A: Elizabeth accepted feedback re: emotional boundaries and the lack of these in her home throughout her upbringing  P: Upcoming sessions will be used to continue to address the above  She will continue to attend both weekly Trauma and ED group therapy sessions  Pain Scale and Suicide Risk [facility]: Current Pain Assessment: moderate to severe   On a scale of 0 to 10, the patient rates current pain at 5   Behavioral Health Treatment Plan [address] [place] [address] [address]: Diagnosis and Treatment Plan explained to patient, patient relates understanding diagnosis and is agreeable to Treatment Plan  Assessment    1  Binge-eating disorder, severe (783 6) (F50 81)   2  Bipolar I disorder, most recent episode depressed, mild (296 51) (F31 31)   3  Gambling disorder, moderate (312 31) (F63 0)   4   Obsessive-compulsive disorder (300 3) (F42 9)    Signatures   Electronically signed by : Stephanie Romero LCSW; Feb 24 2017  9:37AM EST                       (Author)

## 2018-01-13 NOTE — PSYCH
Progress Note  Psychotherapy Provided [facility]: Individual Psychotherapy 50 minutes and Telephone Contact provided today  Goals addressed in session:   1: Pt was seen today for Individual Therapy session  Pt spoke about her feelings re: an increase in her ED behaviors which she discussed fairly openly  Her Treatment Plan was also reviewed and updated  A: Eveleen Severs has been managing the stress of her dog's recent surgery, recovery fairly well over the past week despite the associated expense, concern  P:Elizabeth will continue to attend Individual and Group therapy on a weekly basis  Pain Scale and Suicide Risk [facility]: Current Pain Assessment: moderate to severe   On a scale of 0 to 10, the patient rates current pain at 6   Behavioral Health Treatment Plan [facility]: Diagnosis and Treatment Plan explained to patient, patient relates understanding diagnosis and is agreeable to Treatment Plan  Assessment    1  Bipolar I disorder, most recent episode mixed, moderate (296 62) (F31 62)   2  Eating disorder, unspecified (307 50) (F50 9)   3  Gambling disorder, moderate (312 31) (F63 0)   4   Obsessive-compulsive disorder (300 3) (F42)    Signatures   Electronically signed by : Sridhar Valentin LCSW; May 26 2016  2:52PM EST                       (Author)

## 2018-01-13 NOTE — PSYCH
Progress Note  Psychotherapy Provided [facility]: Group Therapy provided today  Goals addressed in session:   1 D: PT  was seen for Group Therapy session  Pts engaged in a therapeutic discussion re: how they have been managing work stress and family issues  Additional ways to heal / forgive themselves were discussed  A: Elizabeth was open in sharing with the group re: the difficult interactions she has been having with her family at home and how it has been impacting her functioning  P: Pt will be encouraged to continue to attend individual therapy weekly and the Adult ED group on a biweekly basis  Pain Scale and Suicide Risk [facility]: Current Pain Assessment: moderate to severe   On a scale of 0 to 10, the patient rates current pain at 6   Behavioral Health Treatment Plan [facility]: Diagnosis and Treatment Plan explained to patient, patient relates understanding diagnosis and is agreeable to Treatment Plan  Assessment    1   Binge-eating disorder, severe (583 6) (E21 36)    Signatures   Electronically signed by : Alessandro Richard LCSW; Jun 8 2017  2:40PM EST                       (Author)

## 2018-01-13 NOTE — PSYCH
Progress Note  Psychotherapy Provided [facility]: Individual Psychotherapy 50 minutes provided today  Goals addressed in session:   1-3 D: Met with pt for Individual Therapy session  Elizabeth spoke today about her feelings re: her list of worries and the ratings that she assigned to each item on her list of daily things she worries about  This was reviewed and she was challenged to consider "crossing some items off of the list "   A: Elizabeth remained open to the above task today  She was able to acknowledge that she wants to "get to a point where she can relax" and was more positive today  P: Upcoming sessions will be used to continue to address the above  She will continue to attend both weekly Trauma and ED group therapy sessions  Pain Scale and Suicide Risk [facility]: On a scale of 0 to 10, the patient rates current pain at 4   Behavioral Health Treatment Plan [facility]: Diagnosis and Treatment Plan explained to patient, patient relates understanding diagnosis and is agreeable to Treatment Plan  Assessment    1  Binge-eating disorder, severe (783 6) (F50 81)   2  Bipolar I disorder, most recent episode depressed, in full remission (296 56) (F31 76)   3  EILEEN (generalized anxiety disorder) (300 02) (F41 1)   4   Obsessive-compulsive disorder (300 3) (F42 9)    Signatures   Electronically signed by : Jeet Lay LCSW; Jan 19 2017  9:54AM EST                       (Author)

## 2018-01-14 VITALS
BODY MASS INDEX: 44.41 KG/M2 | HEART RATE: 64 BPM | SYSTOLIC BLOOD PRESSURE: 140 MMHG | WEIGHT: 293 LBS | DIASTOLIC BLOOD PRESSURE: 70 MMHG | HEIGHT: 68 IN

## 2018-01-14 VITALS
HEART RATE: 67 BPM | WEIGHT: 293 LBS | DIASTOLIC BLOOD PRESSURE: 84 MMHG | BODY MASS INDEX: 44.41 KG/M2 | SYSTOLIC BLOOD PRESSURE: 119 MMHG | HEIGHT: 68 IN

## 2018-01-14 VITALS — HEIGHT: 68 IN | HEART RATE: 66 BPM | SYSTOLIC BLOOD PRESSURE: 146 MMHG | DIASTOLIC BLOOD PRESSURE: 75 MMHG

## 2018-01-14 VITALS — DIASTOLIC BLOOD PRESSURE: 70 MMHG | HEIGHT: 68 IN | HEART RATE: 78 BPM | SYSTOLIC BLOOD PRESSURE: 118 MMHG

## 2018-01-14 VITALS — HEIGHT: 68 IN | DIASTOLIC BLOOD PRESSURE: 72 MMHG | SYSTOLIC BLOOD PRESSURE: 128 MMHG | HEART RATE: 72 BPM

## 2018-01-14 VITALS — SYSTOLIC BLOOD PRESSURE: 142 MMHG | DIASTOLIC BLOOD PRESSURE: 80 MMHG | HEART RATE: 72 BPM

## 2018-01-14 VITALS — DIASTOLIC BLOOD PRESSURE: 78 MMHG | HEART RATE: 68 BPM | SYSTOLIC BLOOD PRESSURE: 142 MMHG

## 2018-01-14 NOTE — RESULT NOTES
Verified Results  (1) LITHIUM [id number] 08:12AM Amita JAMISON Order Number: [id number]     Test Name Result Flag Reference   LITHIUM 0 7 mmol/L  0 5-1 0

## 2018-01-14 NOTE — PSYCH
Progress Note  Psychotherapy Provided [facility]: Individual Psychotherapy 50 minutes and Telephone Contact provided today  Goals addressed in session:   1: Pt was seen today for Individual Therapy session  Pt spoke about her feelings re: having relapsed in buying over $200 in lottery tickets, shopping online, and overeating  She shared that she discussed this with her Psychiatrist who upped her antidepressant  A: Elizabeth was not depressed during today's session and able to engage in open dialogue about hope and changing her thoughts  Petey Luna P:Elizabeth will continue to attend Individual and Group therapy on a weekly basis  Pain Scale and Suicide Risk [facility]: Current Pain Assessment: moderate to severe   On a scale of 0 to 10, the patient rates current pain at 6   Behavioral Health Treatment Plan [facility]: Diagnosis and Treatment Plan explained to patient, patient relates understanding diagnosis and is agreeable to Treatment Plan  Assessment    1  Bipolar I disorder, most recent episode depressed, moderate (296 52) (F31 32)   2  Eating disorder, unspecified (307 50) (F50 9)   3  Obsessive-compulsive disorder (300 3) (F42)    Plan   1  Behavioral Health Service Flowsheet; Status:Unauthorized - Requires Signature;    Requested for:[id number];     Signatures   Electronically signed by : Tawanna Padilla LCSW; Feb 26 2016  2:54PM EST                       (Author)

## 2018-01-14 NOTE — PSYCH
Progress Note  Psychotherapy Provided [facility]: Individual Psychotherapy 50 minutes provided today  Goals addressed in session:   1-3 D: Met with pt for Individual Therapy session  Elizabeth spoke today about her feelings re: needing to set boundaries with a peer on the topic of self injury  Struggles to delay impulses to "give in" to her compulsions to pickens were also processed  A: Frantz continues to be open and insightful as she works on her desire to develop a stronger sense of self and to challenge her beliefs about herself  She appears to want to work on her ED over the upcoming treatment interval    P: Upcoming sessions will be used to continue to address the above  She will continue to attend both weekly Trauma and ED group therapy sessions  Pain Scale and Suicide Risk [facility]: On a scale of 0 to 10, the patient rates current pain at 4   Behavioral Health Treatment Plan [facility]: Diagnosis and Treatment Plan explained to patient, patient relates understanding diagnosis and is agreeable to Treatment Plan  Assessment    1  Bipolar I disorder, most recent episode depressed, in full remission (296 56) (F31 76)   2  Binge-eating disorder, severe (783 6) (F50 81)   3  EILEEN (generalized anxiety disorder) (300 02) (F41 1)   4   Obsessive-compulsive disorder (300 3) (F42 9)    Signatures   Electronically signed by : Trenton Fajardo LCSW; Dec 22 2016  3:47PM EST                       (Author)

## 2018-01-14 NOTE — PSYCH
Progress Note  Psychotherapy Provided [facility]: Group Therapy provided today  Goals addressed in session:   1 D: PT  was seen for Group Therapy session  Pts engaged in discussion on the topic of coping with emotions  The fear of becoming engulfed by your hate, anger, sadness was processed  A: Elizabeth shared her own struggle with the above feelings with the group as she also supported her peers today  P: Pt will continue to attend individual therapy weekly and the Adult ED group on a biweekly basis  Behavioral Health Treatment Plan [facility]: Diagnosis and Treatment Plan explained to patient, patient relates understanding diagnosis and is agreeable to Treatment Plan  Assessment    1   Binge-eating disorder, severe (643 6) (U16 66)    Signatures   Electronically signed by : Lucy Zaman LCSW; Dec 22 2016  3:56PM EST                       (Author)
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
## 2018-01-14 NOTE — PSYCH
Progress Note  Psychotherapy Provided [facility]: Individual Psychotherapy 50 minutes and Telephone Contact provided today  Goals addressed in session:   1: Pt was seen today for Individual Therapy session  Pt again verbalized her struggle to speak about her feelings and thoughts during her session with this worker today  She stated that she "wanted to," but was struggling to find the words  A: Elizabeth did allow this owrker to provide her with validation and support despite the above  She continues to make progress in addressing her issues despite not always being able to do so verbally or during individual sessions  P:Elizabeth will continue to attend Individual and Group therapy on a weekly basis  Pain Scale and Suicide Risk [facility]: On a scale of 0 to 10, the patient rates current pain at 3   Current suicide risk is low   Behavioral Health Treatment Plan [facility]: Diagnosis and Treatment Plan explained to patient, patient relates understanding diagnosis and is agreeable to Treatment Plan            Signatures   Electronically signed by : Milvia Garber LCSW; Aug 19 2016  9:54AM EST                       (Author)

## 2018-01-14 NOTE — PSYCH
Progress Note  Psychotherapy Provided [facility]: Group Therapy provided today  Goals addressed in session:   1 D: PT  was seen for Group Therapy session  Pts discussed how their attempts at recovery can sometimes be sabotaged by ED  Examples were processed Ways to maintain recovery were shared  A: Elizabeth shared her feelings re: her recent struggles with group members  She accepted feedback and support, as well  P: Pt will continue to attend individual therapy weekly and the Adult ED group on a biweekly basis  Behavioral Health Treatment Plan [facility]: Diagnosis and Treatment Plan explained to patient, patient relates understanding diagnosis and is agreeable to Treatment Plan  Assessment    1   Binge-eating disorder, severe (273 6) (J35 60)    Signatures   Electronically signed by : Jil Daniels LCSW; Oct 12 2016 10:30AM EST                       (Author)

## 2018-01-14 NOTE — PSYCH
Progress Note  Psychotherapy Provided [facility]: Individual Psychotherapy 50 minutes provided today  Goals addressed in session:   1-3 D: Met with Elizabeth for Individual Therapy session  Elizabeth spoke today about her feelings re: her PCP's decision to discontinue the Belviq as a result of the side effects she has been experiencing  She will be seen today by her Urologist, as well  The session was spent discussing why she remains reluctant to track her food intake  A: Elizabeth was able to agree to work on feeling her emotions without judging herself  She will make this her focus this week  P: Upcoming sessions will be used to continue to address the above  She will continue to attend both weekly Trauma and ED group therapy sessions  Pain Scale and Suicide Risk [facility]: Current Pain Assessment: moderate to severe   On a scale of 0 to 10, the patient rates current pain at 5   Behavioral Health Treatment Plan [facility]: Diagnosis and Treatment Plan explained to patient, patient relates understanding diagnosis and is agreeable to Treatment Plan  Assessment    1  Binge-eating disorder, severe (783 6) (F50 81)   2  EILEEN (generalized anxiety disorder) (300 02) (F41 1)   3   Severe bipolar I disorder, most recent episode depressed (296 53) (F31 4)    Signatures   Electronically signed by : Felix Perez LCSW; Aug 31 2017 12:07PM EST                       (Author)

## 2018-01-14 NOTE — PSYCH
Progress Note  Psychotherapy Provided [facility]: Individual Psychotherapy 50 minutes provided today  Goals addressed in session:   1-3 D: Met with pt for Individual Therapy session  Elizabeth spoke again today about her feelings re: the homework she completed since her last session  Her plans to begin volunteering were also discussed  A: Frantz continues to be open and insightful as she works on her desire to stop gambling  She has also opened up about her relationship with her father, as well      P: Upcoming sessions will be used to continue to address the above  She will continue to attend both weekly Trauma and ED group therapy sessions  Pain Scale and Suicide Risk  [facility]: Current Pain Assessment: moderate to severe   On a scale of 0 to 10, the patient rates current pain at 4   Behavioral Health Treatment Plan [facility]: Diagnosis and Treatment Plan explained to patient, patient relates understanding diagnosis and is agreeable to Treatment Plan  Assessment    1  Bipolar I disorder, most recent episode mixed, moderate (296 62) (F31 62)   2  Obsessive-compulsive disorder (300 3) (F42 9)   3   Binge-eating disorder, severe (783 6) (F50 81)    Signatures   Electronically signed by : John Carter LCSW; Nov 16 2016 10:52AM EST                       (Author)

## 2018-01-15 ENCOUNTER — GENERIC CONVERSION - ENCOUNTER (OUTPATIENT)
Dept: OTHER | Facility: OTHER | Age: 33
End: 2018-01-15

## 2018-01-15 VITALS
DIASTOLIC BLOOD PRESSURE: 80 MMHG | OXYGEN SATURATION: 98 % | HEART RATE: 80 BPM | HEIGHT: 68 IN | TEMPERATURE: 98 F | SYSTOLIC BLOOD PRESSURE: 126 MMHG | BODY MASS INDEX: 44.41 KG/M2 | WEIGHT: 293 LBS

## 2018-01-15 NOTE — PSYCH
Progress Note  Psychotherapy Provided [facility]: Group Therapy provided today  Goals addressed in session:   1 D: PT  was seen for Group Therapy session  PT engaged in guided visualization exercise, dialogue re: what life would be like if they began to make recovery-oriented changes  A: Elizabeth shared her feelings openly with the group re: the above  She reported that she has just begun to consider that changing her thinking may be possible  P: Pt will continue to attend individual therapy once a week and the Adult ED group on a biweekly basis  Behavioral Health Treatment Plan [address] [place] [address] [address]: Diagnosis and Treatment Plan explained to patient, patient relates understanding diagnosis and is agreeable to Treatment Plan  Assessment    1   Eating disorder, unspecified (307 50) (F50 9)    Signatures   Electronically signed by : Robi Serrano LCSW; May 25 2016  9:58AM EST                       (Author)

## 2018-01-15 NOTE — PSYCH
Progress Note  Psychotherapy Provided [facility]: Individual Psychotherapy 50 minutes provided today  Goals addressed in session:   1-3 D: Met with pt for Individual Therapy session  Elizabeth spoke today about her feelings re: her desire to use exposure therapy to begin to address her OCD  Homework was assigned  Her struggles to control her thoughts were also explored  A: [facility] has continued to realize the amount of trauma she experienced during her childhood, much of which she had repressed  She continues to develop trust in this worker as she shares these memories  P: Upcoming sessions will be used to continue to address the above  She will continue to attend both weekly Trauma and ED group therapy sessions  Pain Scale and Suicide Risk [facility]: Current Pain Assessment: moderate to severe   On a scale of 0 to 10, the patient rates current pain at 3   Behavioral Health Treatment Plan [facility]: Diagnosis and Treatment Plan explained to patient, patient relates understanding diagnosis and is agreeable to Treatment Plan  Assessment    1  Binge-eating disorder, severe (783 6) (F50 81)   2  Bipolar I disorder, most recent episode mixed, moderate (296 62) (F31 62)   3  EILEEN (generalized anxiety disorder) (300 02) (F41 1)   4  Gambling disorder, moderate (312 31) (F63 0)   5   Obsessive-compulsive disorder (300 3) (F42 9)    Signatures   Electronically signed by : Nael Elena LCSW; Apr 20 2017  2:17PM EST                       (Author)

## 2018-01-15 NOTE — PSYCH
Psych Med Mgmt    Appearance: was calm and cooperative, adequate hygiene and grooming and good eye contact  Observed mood: anxious  Observed mood: affect was constricted  Speech: a normal rate  Thought processes: coherent/organized  Hallucinations: no hallucinations present  Thought Content: no delusions  Abnormal Thoughts: The patient has no suicidal thoughts and no homicidal thoughts  Orientation: The patient is oriented to person, place and time  Recent and Remote Memory: short term memory intact and long term memory intact  Attention Span And Concentration: concentration impaired  Insight: Insight intact  Judgment: Her judgment was intact  Muscle Strength And Tone  Muscle strength and tone were normal  Normal gait and station  Language: no difficulty naming common objects  Fund of knowledge: Patient displays adequate knowledge of current events, adequate fund of knowledge regarding past history and adequate fund of knowledge regarding vocabulary  The patient is experiencing no localized pain  Treatment Recommendations: Continue Lithium 900 mg at bedtime  Continue Ativan 0 5 mg tid PRN  Increase Latuda 40 mg in the evening - patient wants to try a higher dose despite some tiredness  She hopes tiredness will eventually resolve  Decrease Abilify 10 mg at bedtime  Continue Lamictal 400 mg at bedtime  Continue low dose Prozac 20 mg daily - patient feels it helps with OCD symptoms and would like to continue Prozac  Continue therapy with [facility]; also attends eating disorders group  Follows with PCP for glucose and lipid monitoring  Risks, Benefits And Possible Side Effects Of Medications: Risks, benefits, and possible side effects of medications explained to patient and patient verbalizes understanding, Risks of medications explained if female patient  Patient verbalizes understanding and agrees to notify her doctor if she becomes pregnant       She reports increased appetite, decreased energy, recent 3 lbs weight loss and increase in number of sleep hours 12  Patient states she has been doing better since last visit  Mood is more stable, no longer goes on pending sprees, also spends only minimal amount of money now on Enabled Employment tickets  Feels more in control  No depressive symptoms  No suicidality or homicidality  No psychotic symptoms  Reports feeling tired on medications  No other side effects from medications reported  No rash  Vitals  Signs [Data Includes: Current Encounter]   Recorded: 11IWD0853 02:18PM   Heart Rate: 73  Systolic: 192  Diastolic: 65  BP Cuff Size: Extra-Large  Recorded: 22THF4295 02:16PM   BP Cuff Size: Standard  Height: 5 ft 8 in  Weight: 370 lb   BMI Calculated: 56 26  BSA Calculated: 2 65    Assessment    1  Eating disorder, unspecified (307 50) (F50 9)   2  Obsessive-compulsive disorder (300 3) (F42)   3  EILEEN (generalized anxiety disorder) (300 02) (F41 1)   4  Bipolar I disorder, most recent episode mixed, in partial remission (296 65) (F31 77)    Plan    1  From  ARIPiprazole 15 MG Oral Tablet TAKE 1 TABLET AT BEDTIME To   ARIPiprazole 10 MG Oral Tablet TAKE 1 TABLET AT BEDTIME   2  From  Latuda 20 MG Oral Tablet Take 1 tablet in the evening with food To   Latuda 40 MG Oral Tablet Take 1 tablet in the evening with food   3  Follow-up Visit in 4 Weeks Evaluation and Treatment  Follow-up  Status: Hold For -   Scheduling  Requested for: 23DRK3989    Review of Systems    Constitutional: recent 3 lb weight loss  Cardiovascular: no complaints of slow or fast heart rate, no chest pain, no palpitations  Respiratory: no complaints of shortness of breath, no wheezing, no dyspnea on exertion  Gastrointestinal: no complaints of abdominal pain, no constipation, no nausea, no diarrhea, no vomiting  Genitourinary: no complaints of dysuria, no incontinence, no pelvic pain, no urinary frequency     Musculoskeletal: no complaints of arthralgia, no myalgias, no limb pain, no joint stiffness  Integumentary: no complaints of skin rash, no itching, no dry skin  Neurological: no complaints of headache, no confusion, no numbness, no dizziness  Past Psychiatric History    Past Psychiatric History: Multiple past inpatient psychiatric admissions  One past suicide attempt by overdose in 2009  Substance Abuse Hx    Substance Abuse History: No drug or alcohol use  Active Problems    1  Abnormal weight gain (783 1) (R63 5)   2  Acute diverticulitis (562 11) (K57 92)   3  Acute pain (338 19) (R52)   4  Allergic rhinitis (477 9) (J30 9)   5  Amenorrhea (626 0) (N91 2)   6  Bulging lumbar disc (722 10) (M51 26)   7  Dizziness (780 4) (R42)   8  Dysfunctional uterine bleeding (626 8) (N93 8)   9  Dysuria (788 1) (R30 0)   10  Eating disorder, unspecified (307 50) (F50 9)   11  Encounter for routine gynecological examination (V72 31) (Z01 419)   12  Esophageal reflux (530 81) (K21 9)   13  Foot pain (729 5) (M79 673)   14  EILEEN (generalized anxiety disorder) (300 02) (F41 1)   15  Gambling disorder, moderate (312 31) (F63 0)   16  Hypertension (401 9) (I10)   17  Hypothyroidism (244 9) (E03 9)   18  Lumbago (724 2) (M54 5)   19  Lumbar degenerative disc disease (722 52) (M51 36)   20  Menorrhagia (626 2) (N92 0)   21  Morbid or severe obesity due to excess calories (278 01) (E66 01)   22  Obsessive-compulsive disorder (300 3) (F42)   23  Obstructive sleep apnea (327 23) (G47 33)   24  Otalgia, unspecified laterality (388 70) (H92 09)   25  Prediabetes (790 29) (R73 09)   26  Urinary tract infection (599 0) (N39 0)   27  URTI (acute upper respiratory infection) (465 9) (J06 9)   28  Viral bronchitis (466 0) (J20 8)   29  Vitamin D deficiency (268 9) (E55 9)    Past Medical History    1  History of Abscess of face (682 0) (L02 01)   2  History of Acute nonsuppurative otitis media, unspecified laterality (381 00) (C55 199)   3   History of Backache (724 5) (M54 9)   4  History of Candidiasis, cutaneous (112 3) (B37 2)   5  History of Dog bite (879 8,E906 0) (W54  0XXA)   6  History of Dysfunction of left Eustachian tube (381 81) (H69 82)   7  History of acute sinusitis (V12 69) (Z87 09)   8  Denied: History of head injury   9  History of Pseudotumor cerebri (348 2) (G93 2)   10  Denied: History of Seizures   11  History of Suicide and self-inflicted injury (L191 7) (A82 4NAR)    The active problems and past medical history were reviewed and updated today  Allergies    1  No Known Drug Allergies    Current Meds   1  Accu-Chek Martha Device; test 4 times daily; Therapy: 51HIP1189 to (Last Rx:78Dkb0738)  Requested for: 80GVV0657 Ordered   2  Accu-Chek Martha In Vitro Strip; TEST 4 TIMES DAILY; Therapy: 35YVA6491 to (Evaluate:31Kew8086)  Requested for: 91KQP3964; Last   Rx:04Olj8422 Ordered   3  Accu-Chek Soft Touch Lancets Miscellaneous; test blood sugar four times daily; Therapy: 50NXY9529 to (Evaluate:68Evl1251)  Requested for: 02ZSP7989; Last   Rx:74Tnz4586 Ordered   4  Amoxicillin-Pot Clavulanate 875-125 MG Oral Tablet; TAKE 1 TABLET EVERY 12 HOURS   DAILY; Therapy: 21RXL6954 to ([employer])  Requested for: 60Dou4762; Last   Rx:45Dqc3006 Ordered   5  ARIPiprazole 15 MG Oral Tablet; TAKE 1 TABLET AT BEDTIME  Requested for:   68Jor3709; Last Rx:14Dlm5310 Ordered   6  AZO Cranberry 250-30 MG Oral Tablet; Take 1 tablet 4 times daily; Therapy: 29ZYH0135 to ([employer])  Requested for: 73Hsz6238; Last   Rx:75Bso6740 Ordered   7  FLUoxetine HCl - 20 MG Oral Capsule; TAKE 1 CAPSULE DAILY; Therapy: 36FTR3258 to (Evaluate:62Msb3021)  Requested for: 35Lae2734; Last   Rx:76Kuz2544 Ordered   8  Hydrocodone-Acetaminophen 5-325 MG Oral Tablet; TAKE 1 TABLET 3 times daily PRN   as needed for pain; Therapy: 60KQJ3214 to (Evaluate:28Nov2014); Last Rx:31Oct2014 Ordered   9  LamoTRIgine 200 MG Oral Tablet; TAKE 2 TABLETS AT BEDTIME;    Therapy: 83SCX5600 to (Evaluate:33Oig7854)  Requested for: 21Apr2016; Last   Rx:24Nfi0915 Ordered   10  Lansoprazole 15 MG Oral Capsule Delayed Release; take 1 capsule daily; Last    Rx:38Gaj6509 Ordered   11  Latuda 20 MG Oral Tablet; Take 1 tablet in the evening with food; Therapy: 21Apr2016 to (Evaluate:83Ryt6787)  Requested for: 21Apr2016; Last    Rx:15Qtc3236 Ordered   12  Levocetirizine Dihydrochloride 5 MG Oral Tablet; take 1 tablet by mouth every day; Therapy: 12DPI3691 to (Catrachita Gómez)  Requested for: 60EEP5196; Last    Rx:04Jan2016 Ordered   15  Levothyroxine Sodium 100 MCG Oral Tablet; take 1 tablet by mouth every day; Therapy: 82UNY8167 to (Sisi Andrade)  Requested for: 08UOG6520; Last    Rx:29Jan2016 Ordered   14  Lisinopril 10 MG Oral Tablet; TAKE 1 TABLET BY MOUTH EVERY DAY FOR BLOOD    PRESSURE; Therapy: 92VZN0291 to (Evaluate:10Jun2016)  Requested for: 29LYD0200; Last    Rx:27Wyt9162 Ordered   15  Lithium Carbonate  MG Oral Tablet Extended Release; TAKE 2 TABLETS AT    BEDTIME; Therapy: 75FPO2194 to (Evaluate:19Kdl2369)  Requested for: 21Apr2016; Last    Rx:72Dmz4517 Ordered   16  LORazepam 0 5 MG Oral Tablet; Take 1 tablet 3 times daily  as needed; Therapy: 71Czu3116 to (Evaluate:57Rcx9573); Last Rx:34Oys3449 Ordered   17  MetFORMIN HCl  MG Oral Tablet Extended Release 24 Hour; TAKE 1 TABLET BY    MOUTH EVERY DAY WITH SUPPER; Therapy: 73EBH9924 to (Evaluate:88Nax8306)  Requested for: 01UJK5880; Last    Rx:08Mar2016 Ordered   18  MetroNIDAZOLE 500 MG Oral Tablet; TAKE 1 TABLET EVERY 6 HOURS DAILY; Therapy: 75KJB2023 to Recorded   19  Nitrofurantoin Monohyd Macro 100 MG Oral Capsule; TAKE 1 CAPSULE EVERY 12    HOURS DAILY; Therapy: 83XGS0810 to (Carlito Eng)  Requested for: 01GMZ9374; Last    Rx:97Oic7547 Ordered   20  PriLOSEC OTC 20 MG Oral Tablet Delayed Release; TAKE 1 TABLET TWICE DAILY;     Therapy: 13Apr2016 to (Evaluate:12Jun2016)  Requested for: 60KXU9672; Last    Rx:41Dlx5696 Ordered   21  Proventil  (90 Base) MCG/ACT Inhalation Aerosol Solution; 2 PUFFS Q 4 HRS; Therapy: 75WRO3674 to (Last NH:05BIB9623)  Requested for: 19GNM7977 Ordered    The medication list was reviewed and updated today  Family Psych History  Mother    1  Family history of bicuspid aortic valve (V17 49) (Z82 79)   2  Family history of depression (V17 0) (Z81 8)   3  Family history of Hypertension (V17 49)   4  Family history of Status post aortic valve replacement  Father    5  Family history of Hypertension (V17 49)   6  Family history of Hypothyroidism   7  No family history of mental disorder  Brother    8  Family history of Hypertension (V17 49)  Maternal Grandmother    9  Family history of Cancer   10  Family history of cerebrovascular accident (V17 1) (Z82 3)   11  Family history of Heart Disease (V17 49)  Paternal Grandmother    15  Family history of Cancer   13  Family history of malignant neoplasm (V16 9) (Z80 9)  Maternal Grandfather    15  Family history of pneumonia (V18 8) (Z83 1)  Paternal Grandfather    13  Family history of pneumonia (V18 8) (Z83 1)  Family History    12  Family history of Arthritis (716 90) (M19 90)   17  Family history of Breast Cancer (V16 3)   18  Family history of osteoporosis (V17 81) (Z82 62)   19  Family history of Hypertension (V17 49)   20  Family history of Transient Ischemic Attack    The family history was reviewed and updated today  Social History    · Caffeine Use   · Currently sexually active   · Disabled   · Former smoker (K73 02) (K16 224)   · High school graduate   · Lives with parents   · Never smoked   · No alcohol use   · Tea  The social history was reviewed and updated today  The social history was reviewed and is unchanged  Single, lives with mother and stepfather  No children  Some college  On disability  Worked in past in retail  No history of legal problems  No  history        History Of Phys/Sex Abuse Or Perpetration    History Of Phys/Sex Abuse or Perpetration: No history of physical or sexual abuse  End of Encounter Meds    1  MetroNIDAZOLE 500 MG Oral Tablet; TAKE 1 TABLET EVERY 6 HOURS DAILY; Therapy: 48JXX4498 to Recorded    2  Levocetirizine Dihydrochloride 5 MG Oral Tablet; take 1 tablet by mouth every day; Therapy: 94EZO9577 to (Verdis Lynne)  Requested for: 65EBN8986; Last   Rx:04Jan2016 Ordered    3  ARIPiprazole 10 MG Oral Tablet; TAKE 1 TABLET AT BEDTIME  Requested for:   23FMC4747; Last OD:45ISU4938 Ordered   4  Latuda 40 MG Oral Tablet; Take 1 tablet in the evening with food; Therapy: 05Ean3373 to (Evaluate:58Reu4191)  Requested for: 43VAL3320; Last   Rx:61Jeh0654 Ordered    5  LamoTRIgine 200 MG Oral Tablet; TAKE 2 TABLETS AT BEDTIME; Therapy: 41AON4061 to (Evaluate:08Jcb8137)  Requested for: 21Apr2016; Last   Rx:17Yrx8652 Ordered   6  Lithium Carbonate  MG Oral Tablet Extended Release; TAKE 2 TABLETS AT   BEDTIME; Therapy: 89VNK8078 to (Evaluate:60Pgv9695)  Requested for: 21Apr2016; Last   Rx:52Qev1319 Ordered    7  FLUoxetine HCl - 20 MG Oral Capsule; TAKE 1 CAPSULE DAILY; Therapy: 05VVR3781 to (Evaluate:81Qpv0208)  Requested for: 21Apr2016; Last   Rx:08Scy0235 Ordered    8  Nitrofurantoin Monohyd Macro 100 MG Oral Capsule; TAKE 1 CAPSULE EVERY 12   HOURS DAILY; Therapy: 34VKL3718 to (Nasreen Maloney)  Requested for: 00RDT9298; Last   Rx:16Ifc5428 Ordered    9  Lansoprazole 15 MG Oral Capsule Delayed Release (Prevacid); take 1 capsule daily; Last Rx:11Tgs4037 Ordered    10  LORazepam 0 5 MG Oral Tablet; Take 1 tablet 3 times daily  as needed; Therapy: 90Vxn3402 to (Evaluate:86Lyj2212); Last Rx:70Ouz5485 Ordered   11  PriLOSEC OTC 20 MG Oral Tablet Delayed Release; TAKE 1 TABLET TWICE DAILY; Therapy: 89Bbt4598 to (Evaluate:12Jun2016)  Requested for: 13Apr2016; Last    Rx:13Apr2016 Ordered    12   Lisinopril 10 MG Oral Tablet; TAKE 1 TABLET BY MOUTH EVERY DAY FOR BLOOD    PRESSURE; Therapy: 58ZTE1965 to (Evaluate:10Jun2016)  Requested for: 49GAU6464; Last    Rx:02Pry8196 Ordered    13  Levothyroxine Sodium 100 MCG Oral Tablet (Synthroid); take 1 tablet by mouth every day; Therapy: 84GFX9989 to (Jinx Romi)  Requested for: 55PVM0932; Last    Rx:29Jan2016 Ordered    14  Hydrocodone-Acetaminophen 5-325 MG Oral Tablet; TAKE 1 TABLET 3 times daily PRN    as needed for pain; Therapy: 60RUB4755 to (Evaluate:28Nov2014); Last Rx:44Wqn8508 Ordered    15  Accu-Chek Martha Device; test 4 times daily; Therapy: 91KKQ8551 to (Last Rx:19Bos2906)  Requested for: 20MDY8991 Ordered   16  Accu-Chek Martha In Vitro Strip; TEST 4 TIMES DAILY; Therapy: 49LNI0858 to (Evaluate:15May2016)  Requested for: 03XDB9055; Last    Rx:74Pjz1821 Ordered   17  Accu-Chek Soft Touch Lancets Miscellaneous; test blood sugar four times daily; Therapy: 58GXQ9473 to (Evaluate:13Apr2016)  Requested for: 95VPT7790; Last    Rx:78Ohg5707 Ordered   18  MetFORMIN HCl  MG Oral Tablet Extended Release 24 Hour; TAKE 1 TABLET BY    MOUTH EVERY DAY WITH SUPPER; Therapy: 81OGB7147 to (Evaluate:52Bmo9748)  Requested for: 53YRM6699; Last    Rx:08Mar2016 Ordered    19  AZO Cranberry 250-30 MG Oral Tablet; Take 1 tablet 4 times daily; Therapy: 56HEH3579 to (Elise Lau)  Requested for: 26Apr2016; Last    Rx:26Apr2016 Ordered    20  Amoxicillin-Pot Clavulanate 875-125 MG Oral Tablet; TAKE 1 TABLET EVERY 12 HOURS    DAILY; Therapy: 48SDR1814 to (Elise Lau)  Requested for: 26Apr2016; Last    Rx:26Apr2016 Ordered   21  Proventil  (90 Base) MCG/ACT Inhalation Aerosol Solution; 2 PUFFS Q 4 HRS; Therapy: 68JME8253 to (Last Rx:27Jan2016)  Requested for: 83LMU7232 Ordered    Future Appointments    Date/Time Provider Specialty Site   12/09/2016 08:10 AM PAUL Donato   Endocrinology [facility] ENDOCRINOLOGY   05/31/2016 03:30 PM PAUL Samson  Psychiatry [facility]   06/29/2016 03:00 PM PAUL Awad   Psychiatry [facility]   05/05/2016 02:00 PM [facility], Rosendo Stacy, [facility] THERAPISTS   05/10/2016 05:00 PM [facility], Rosendo Stacy, [facility] [address] [facility] THERAPISTS   05/12/2016 01:00 PM [facility], Rosendo Stacy, [facility] THERAPISTS   05/19/2016 01:00 PM [facility], Rosendo Stacy, [facility] THERAPISTS   05/24/2016 05:00 PM [facility]   05/26/2016 01:00 PM [facility]   06/02/2016 01:00 PM [facility]     Signatures   Electronically signed by : PAUL Shaw ; May  3 2016  2:30PM EST                       (Author)

## 2018-01-15 NOTE — PSYCH
Progress Note  Psychotherapy Provided [facility]: Individual Psychotherapy 50 minutes provided today  Goals addressed in session:   1-3 D: Met with pt for Individual Therapy session  Elizabeth spoke today about her feelings re: her attempts to be more assertive with her father and his regression in witholding his racial comments  Her progress was processed today  A: Elizabeth admitted how negative and hurtful her mother is to her today and has spent her whole life believing what her mother says to be true  She is more receptive to challenging this at present  P: Upcoming sessions will be used to continue to address the above  She will continue to attend both weekly Trauma and ED group therapy sessions  Pain Scale and Suicide Risk [facility]: On a scale of 0 to 10, the patient rates current pain at 4   Behavioral Health Treatment Plan [facility]: Diagnosis and Treatment Plan explained to patient, patient relates understanding diagnosis and is agreeable to Treatment Plan  Assessment    1  Bipolar I disorder, most recent episode depressed, mild (296 51) (F31 31)   2  Binge-eating disorder, severe (783 6) (F50 81)   3  EILEEN (generalized anxiety disorder) (300 02) (F41 1)   4  Gambling disorder, moderate (312 31) (F63 0)   5   Obsessive-compulsive disorder (300 3) (F42 9)    Signatures   Electronically signed by : Tawanna Padilla LCSW; Feb 17 2017  8:05AM EST                       (Author)

## 2018-01-15 NOTE — PSYCH
Progress Note  Psychotherapy Provided [facility]: Group Therapy provided today  Goals addressed in session:   1 D: Pt attended Trauma Group for the first time today  Pts engaged in a dialogue re: how the growing brain is impacted by trauma  Discussion ensued following in which personal examples were shared as applicable  A: The material being discussed was new to [place]  She was curious and receptive to learning what was presented, and was open in sharing her own symptoms with the group   P: Pt will continue to be seen for individual and group therapy during which the above will be further processed  Pain Scale and Suicide Risk [facility]: On a scale of 0 to 10, the patient rates current pain at 5   Current suicide risk is low   Behavioral Health Treatment Plan [facility]: Diagnosis and Treatment Plan explained to patient, patient relates understanding diagnosis and is agreeable to Treatment Plan  Assessment    1   EILEEN (generalized anxiety disorder) (300 02) (F41 1)    Signatures   Electronically signed by : Nael Elena LCSW; Sep 23 2016 10:42AM EST                       (Author)

## 2018-01-15 NOTE — PSYCH
Progress Note  Psychotherapy Provided [facility]: Group Therapy provided today  Goals addressed in session:   1 D: Pt attended Trauma Group  Pts engaged in an activity re: how their pets are a significant part of their support system  [facility] was also explored with them  A: Pt openly shared today on this topic while also providing support to peers  P: Pt will continue to be seen for individual and group therapy during which the above will be further processed  Pain Scale and Suicide Risk [facility]: Current Pain Assessment: moderate to severe   On a scale of 0 to 10, the patient rates current pain at 5   Behavioral Health Treatment Plan [address] [place] [address] [address]: Diagnosis and Treatment Plan explained to patient, patient relates understanding diagnosis and is agreeable to Treatment Plan  Assessment    1   EILEEN (generalized anxiety disorder) (300 02) (F41 1)    Signatures   Electronically signed by : [facility], [facility]; May 31 2017 10:28AM EST                       (Author)

## 2018-01-15 NOTE — PSYCH
Progress Note  Psychotherapy Provided [facility]: Individual Psychotherapy 50 minutes and Telephone Contact provided today  Goals addressed in session:   1: Pt was seen today for Individual Therapy session  Pt spoke about her feelings re: having spoke further to her ex- paramour  She also shared that she is angry at her mother for starting to binge and pickens  A: Elizabeth was not depressed during today's session and able to engage and to consider ways to modify her OCD thoughts She also agreed to consider attending this Clinic's PHP  P:Elizabeth will continue to attend Individual and Group therapy on a weekly basis  Pain Scale and Suicide Risk [facility]: Current Pain Assessment: moderate to severe   On a scale of 0 to 10, the patient rates current pain at 6   Behavioral Health Treatment Plan [facility]: Diagnosis and Treatment Plan explained to patient, patient relates understanding diagnosis and is agreeable to Treatment Plan  Assessment    1  Bipolar I disorder, most recent episode depressed, moderate (296 52) (F31 32)   2  Eating disorder, unspecified (307 50) (F50 9)   3  EILEEN (generalized anxiety disorder) (300 02) (F41 1)   4   Obsessive-compulsive disorder (300 3) (F42)    Signatures   Electronically signed by : Maxim Newton LCSW; Mar 17 2016  2:51PM EST                       (Author)

## 2018-01-15 NOTE — PSYCH
Progress Note  Psychotherapy Provided [facility]: Individual Psychotherapy 50 minutes and Telephone Contact provided today  Goals addressed in session:   1: Pt was seen today for Individual Therapy session  Pt spoke about her feelings re: having taken on the debt of a new car lease  She also acknowledged a new level of awareness re: her mother's mental illness that she has been unwilling to admit to in previous sessions  A: Elizabeth has begun to recalibrate her own level of functioning as being higher than her mother's, than she has previously perceived  P:Elizabeth will continue to attend Individual and Group therapy on a weekly basis  Pain Scale and Suicide Risk [facility]: On a scale of 0 to 10, the patient rates current pain at 3   Current suicide risk is low   Behavioral Health Treatment Plan [facility]: Diagnosis and Treatment Plan explained to patient, patient relates understanding diagnosis and is agreeable to Treatment Plan  Assessment    1  Bipolar I disorder, most recent episode depressed, moderate (296 52) (F31 32)   2  Gambling disorder, moderate (312 31) (F63 0)   3   Eating disorder, unspecified (307 50) (F50 9)    Signatures   Electronically signed by : Sridhar Valentin LCSW; Jun 23 2016  3:59PM EST                       (Author)

## 2018-01-15 NOTE — PSYCH
Progress Note  Psychotherapy Provided [facility]: Group Therapy provided today  Goals addressed in session:   1 D: Above individual attended Trauma Group  Pts engaged in an activity re:sharing / letting go of traumas that she has continued to hold onto  A: Elizabeth shared without reservation and supported her peers in doing so, as well  P: She will continue to be seen for individual and group therapy during which the above will be further processed  Pain Scale and Suicide Risk [facility]: Current Pain Assessment: moderate to severe   On a scale of 0 to 10, the patient rates current pain at 6   Behavioral Health Treatment Plan [facility]: Diagnosis and Treatment Plan explained to patient, patient relates understanding diagnosis and is agreeable to Treatment Plan  Assessment    1   EILEEN (generalized anxiety disorder) (300 02) (F41 1)    Signatures   Electronically signed by : Sridhar Valentin LCSW; Jun 29 2017 10:30AM EST                       (Author)

## 2018-01-15 NOTE — PSYCH
Psych Med Mgmt    Appearance: was calm and cooperative, adequate hygiene and grooming and good eye contact  Observed mood: depressed and anxious  Observed mood: affect was constricted  Speech: a normal rate and fluent   Normal volume  Thought processes: coherent/organized   Obsessive, ruminative  Hallucinations: no hallucinations present  Thought Content: no delusions  Abnormal Thoughts: The patient has no suicidal thoughts and no homicidal thoughts  Orientation: The patient is oriented to person, place and time  Recent and Remote Memory: short term memory intact and long term memory intact  Attention Span And Concentration: concentration intact  Judgment: Fair insight and judgement   Muscle Strength And Tone  Normal gait and station  Language: no difficulty naming common objects and no difficulty repeating a phrase  Fund of knowledge: Patient displays adequate knowledge of current events, adequate fund of knowledge regarding past history and adequate fund of knowledge regarding vocabulary  The patient is experiencing no localized pain  Treatment Recommendations: Pt is having some improvement in her moderate mood Sxs  Anxiety is moderate to severe, Binge eating still severe  Increase Sertraline for all Sxs  Pt accepts the plan  Increase Sertraline to 75mg total per day given as 50mg + 25mg (1) tab po qd # 30 R1 each  Continue:  Lamotrigine 200mg (2) tabs po qhs # 60 R3  Latuda 120mg (1) tab po q evening with food--already renewed recently  Lithium Carb ER 450mg (2 1/2) tabs po qhs # 75 R3  Lorazepam 0 5mg (1) tab po tid prn anxiety--Pt has a refill on prior Rx and states she does not need more at this time  Hydroxyzine 50mg (1 1/2-2) tabs po qhs prn insomnia # 30 R1  Vit D (OTC)  Continue group and individual counseling  Return 6 weeks     Risks, Benefits And Possible Side Effects Of Medications: Risks, benefits, and possible side effects of medications explained to patient and patient verbalizes understanding, Risks of medications explained if female patient  Patient verbalizes understanding and agrees to notify her doctor if she becomes pregnant  Discussed with patient the risks of sedation, respiratory depression, impairment of ability to drive and potential for abuse and addiction related to treatment with benzodiazepine medications  The patient understands risk of treatment with benzodiazepine medications, agrees to not drive if feels impaired and agrees to take medications as prescribed  The patient has been filling controlled prescriptions on time as prescribed to [facility] program     She reports normal appetite, normal energy level, no weight change and decrease in number of sleep hours   Miri Ag is a 30y/o female here for medication review with primary c/o "Anxious " There was a lot of arguing with her family which has calmed down more recently  She has much anxiety and obsessive thoughts  She felt the Sertraline improved the obsessive thoughts at first, but this Sx worsened again  Her SI are less frequent and more fleeting without intent or plan  She can dismiss them  No cutting since last reported  She did not overspend this month  She is binging 1-2x per day (slightly less than before)  The Lorazepam relaxed her a little and the increase in Hydroxyzine helped a little  She is socializing more --getting out with friends more  She last gambled in 11/2016  She presently denies SI, intent or plan, HI, food restriction, purging, or other compensatory disordered eating behaviors, manic or psychotic Sxs  Pt reports compliance to her medications without SE  pt continues psychotherapy with Ms Honeycutt whose 5/2017 and 6/2017 individual and group therapy notes I reviewed  Vitals  Signs   Recorded: 12Jun2017 02:04PM   Heart Rate: 69  Systolic: 780, LUE, Sitting  Diastolic: 81, LUE, Sitting  Height: 5 ft 8 in    Assessment    1  Bipolar 1 disorder, depressed, moderate (296 52) (F31 32)   2  EILEEN (generalized anxiety disorder) (300 02) (F41 1)   3  Binge-eating disorder, severe (783 6) (F50 81)   4  Obsessive-compulsive disorder (300 3) (F42 9)   5  Vitamin D deficiency (268 9) (E55 9)    Plan    1  Sertraline HCl - 25 MG Oral Tablet; TAKE 1 TAB BY MOUTH DAILY      Take with the 50mg tablet    2  Sertraline HCl - 50 MG Oral Tablet; TAKE 1 TAB BY MOUTH DAILY    3  LamoTRIgine 200 MG Oral Tablet; TAKE 2 TABLETS AT BEDTIME   4  Lithium Carbonate  MG Oral Tablet Extended Release; Take 2 and 1/2   tablets at bedtime    5  HydrOXYzine HCl - 50 MG Oral Tablet; TAKE 1/2 TO1 TABLET AT BEDTIME AS   NEEDED FOR INSOMNIA    Review of Systems    Constitutional: No fever, no chills, feels well, no tiredness, no recent weight gain or loss  Cardiovascular: no complaints of slow or fast heart rate, no chest pain, no palpitations  Respiratory: no complaints of shortness of breath, no wheezing, no dyspnea on exertion  Gastrointestinal: no complaints of abdominal pain, no constipation, no nausea, no diarrhea, no vomiting  Neurological: no complaints of headache, no confusion, no numbness, no dizziness  Past Psychiatric History    Past Psychiatric History: Pt felt emotional neglect by mom who was raising Pt and her older brother as a single parent  Mom also had mental illness (MDD) and Pt (at 8y/o) witnessed her try to OD on pills  Mom also used to binge eat and eating has been a coping mechanism in the family  Pt first felt Sxs of a psychiatric nature at 16y/o --OCD Sxs of cleaning at first, then became obsessive over Wt loss and began dieting then restricting foods and exercising excessively, writing a list of goals/chores for the day--ie to clean, organize, "Line up something " Any new thing she needs to do or wants to get done gets added to the list  She then ruminates about what she has to get done for the day   Through time, the list has grown and when Sxs began, she got some satisfaction with completing them  In later years, even after she completes an item on the list, she only worries about what else she will need to do  She states "I know there's always more" to add to the list    She was "Slightly overweight" in teen years and was active (played basketball in school), but genetics caused a degree of Wt gain  Pt initially addressed her Wt issue by changed the kinds of foods she was eating, but within 1 month began to restrict meals daily  She started to exercise daily and was losing 20lbs per month at 22y/o  She first saw a psychologist at approx 13y/o because she thinks her mom thought she was depressed  Pt only went to that therapist for a few session and did not want to keep going  She first saw a psychiatrist at 14y/o and was formally diagnosed --but Pt does not recall the Dx  She knows she was given Prozac but stopped it at the advice of her mother who was worried about any extra things causing problems  Pt had other medical issues going on-- had developed pseudotumor cerebri around that time  The Prozac was stopped and she was not on another psychiatric medication until admission to [facility] in 2006  She was given Seroquel briefly at that time and an antidepressant--cannot recall which one  She thinks she may have been depressed in childhood but first became aware of Sxs of depression at 18y/o--felt ignored by her mother, she had lost contact with many school friends due to debilitation from pseudotumor cerebri which kept her home schooled for the last 2 years of high school  Depressive Sxs were years of daily sadness, feeling overwhelmed, hopelessness, worthlessness, anhedonia, self-isolating, hypersomnolence and SI  She self-mutilated (cutting her arms and stomach) 3683-4356  She started cutting due to concern about weight   She felt suicidal over her weight, but when she could not go through with suicide, she cut instead  She "Gambles" with [employer] tickets in adulthood--was spending at least $500 per month 2014 but reduced it greatly since 11/2016  The desire to spend by gambling was reduced somewhat on it's own  However, the spending did not stop completely and she started buying clothing instead  Her anxiety was accompanied by worry, irritability, restlessness, lack of concentration, worse OCD Sxs in terms of doing more cleaning  and "Mildly" panic attacks  Panic Sxs were moderate anxiety, a little bit of fear, ruminating about things on her list, sometimes tachycardia  She has tried volunteer work but it was stressful trying to make it to her obligation on time  Pt has h/o multiple psychiatric admissions:  [facility], [address] [facility] outpatient eating disorder program in 2006 and 2008 for anorexia nervosa--was restricting, but also over-exercising, sometimes binging in small amounts  Tried purging once (self-induced vomiting) in the past     [facility] 2008 and 2009    [facility] 2013    [facility] multiple times in the past to avoid repeat hospitalizations  During an admission to [facility] in 2008 she was given ECT  On discharge she was referred for psychotherapy with Calli Garcia at [facility] and was from there referred to outpatient psychiatry here as well  Pt states she was first seen by Dr Elisha Alcantara in approx 2009 and was given a diagnosis of bipolar I disorder for the depressive Sxs above and irritable moods, rapid speech, distractibility--Pt unsure of other Sxs  No past paper chart available at this time  She was started on Lamotrigine and Fluoxetine was restarted  Doses of Fluoxetine above 20mg caused "Hyperactivity" and anxiety  Pt denies any h/o psychotic Sxs  One suicidal attempt --approx 2009 by OD on her psychiatric medications  Admitted to [facility]       On permanent disability since 2007 for mental illness    Pt tried: Bupropion SR, Buspirone, Citalopram, Effexor, Trazodone, Aripiprazole, Seroquel, Depakote, Topiramate, Clonazepam         Substance Abuse Hx    Substance Abuse History: Pt denies any h/o ETOH or illicit drug abuse    She has an alcoholic drink rarely, and when she does, it is only on special occasions/holidays  She tried THC once  Active Problems    1  Abnormal weight gain (783 1) (R63 5)   2  Acute bronchitis (466 0) (J20 9)   3  Acute diverticulitis (562 11) (K57 92)   4  Acute frontal sinusitis (461 1) (J01 10)   5  Acute pain (338 19) (R52)   6  Allergic rhinitis (477 9) (J30 9)   7  Amenorrhea (626 0) (N91 2)   8  Binge-eating disorder, severe (783 6) (F50 81)   9  Bipolar 1 disorder, depressed, moderate (296 52) (F31 32)   10  Bronchitis (490) (J40)   11  Bulging lumbar disc (722 10) (M51 26)   12  Cough (786 2) (R05)   13  Dysuria (788 1) (R30 0)   14  Esophageal reflux (530 81) (K21 9)   15  Foot pain (729 5) (M79 673)   16  EILEEN (generalized anxiety disorder) (300 02) (F41 1)   17  Gambling disorder, moderate (312 31) (F63 0)   18  Hypertension (401 9) (I10)   19  Hypothyroidism (244 9) (E03 9)   20  Long term current use of therapeutic drug (V58 69) (Z79 899)   21  Lumbago (724 2) (M54 5)   22  Lumbar degenerative disc disease (722 52) (M51 36)   23  Morbid or severe obesity due to excess calories (278 01) (E66 01)   24  Nocturnal enuresis (788 36) (N39 44)   25  Obsessive-compulsive disorder (300 3) (F42 9)   26  Overactive bladder (596 51) (N32 81)   27  Prediabetes (790 29) (R73 09)   28  RLS (restless legs syndrome) (333 94) (G25 81)   29  Urinary incontinence, unspecified type (788 30) (R32)   30  Vitamin D deficiency (268 9) (E55 9)    Past Medical History    1  History of Abscess of face (682 0) (L02 01)   2  History of Acute nonsuppurative otitis media, unspecified laterality (381 00) (H65 199)   3   History of Anorexia nervosa in remission (307 1) (F50 00)   4  History of Backache (724 5) (M54 9)   5  History of Candidiasis, cutaneous (112 3) (B37 2)   6  History of Dog bite (879 8,E906 0) (W54  0XXA)   7  History of Dysfunction of left eustachian tube (381 81) (H69 82)   8  History of Dysuria (788 1) (R30 0)   9  History of acute sinusitis (V12 69) (Z87 09)   10  Denied: History of head injury   11  History of Pseudotumor cerebri (348 2) (G93 2)   12  Denied: History of Seizures   13  History of Suicide and self-inflicted injury (N318 0) (T57 9WRV)    The active problems and past medical history were reviewed and updated today  Allergies    1  No Known Drug Allergies    2  Seasonal    Current Meds   1  Accu-Chek Martha Device; test 4 times daily; Therapy: 93NCK8241 to (Last Rx:14Vri1642)  Requested for: 60ZIK7227 Ordered   2  Accu-Chek Martha STRP; TEST 4 TIMES DAILY; Therapy: 49AWN3867 to (Evaluate:02Oll4168)  Requested for: 15JYW5878; Last   Rx:26Ekr7876 Ordered   3  Accu-Chek Soft Touch Lancets Miscellaneous; test blood sugar four times daily; Therapy: 48QFU9636 to (Evaluate:28Rgd2174)  Requested for: 04LZP7110; Last   Rx:20Qfd0381 Ordered   4  Fluticasone Propionate 50 MCG/ACT Nasal Suspension; USE 2 SPRAYS IN EACH   NOSTRIL ONCE DAILY; Therapy: 83OCU0729 to (Last Rx:13Mkv6131)  Requested for: 47HXF0246 Ordered   5  HydrOXYzine HCl - 50 MG Oral Tablet; TAKE 1/2 TO1 TABLET AT BEDTIME AS NEEDED   FOR INSOMNIA; Therapy: 99JEL4973 to (Evaluate:57Kgb4345)  Requested for: 75ALB5153; Last   JI:35FGW6321 Ordered   6  LamoTRIgine 200 MG Oral Tablet; TAKE 2 TABLETS AT BEDTIME; Therapy: 31RNA0540 to (Daryl Hui)  Requested for: 83LSE9875; Last   Rx:27Jan2017 Ordered   7  Latuda 120 MG Oral Tablet; TAKE 1 TABLET IN THE EVENING WITH FOOD; Therapy: 21Apr2016 to (Daryl Hui)  Requested for: 19XEW0789; Last   Rx:27Jan2017; Status: ACTIVE - Renewal Denied Ordered   8   Levocetirizine Dihydrochloride 5 MG Oral Tablet; TAKE 1 TABLET BY MOUTH ONCE A   DAY; Therapy: 21Ayp9367 to (Evaluate:00Xzq4711)  Requested for: 27GQI3839; Last   Rx:18May2017 Ordered   9  Levothyroxine Sodium 100 MCG Oral Tablet; take 1 tablet by mouth every day; Therapy: 72TKR5703 to (Evaluate:22Sxs3424)  Requested for: 89ORP8911; Last   Rx:09Nov2016 Ordered   10  Lisinopril 10 MG Oral Tablet; TAKE 1 TABLET BY MOUTH EVERY DAY FOR BLOOD    PRESSURE; Therapy: 23QEY0363 to (Evaluate:15Sep2017)  Requested for: 46IRJ1168; Last    Rx:18May2017 Ordered   11  Lithium Carbonate  MG Oral Tablet Extended Release; Take 2 and 1/2 tablets at    bedtime; Therapy: 85UVM1918 to (Hilda Yousif)  Requested for: 38JIB7689; Last    Rx:03Mar2017 Ordered   12  LORazepam 0 5 MG Oral Tablet; Take 1 tablet 3 times daily  as needed; Therapy: 03Sep2014 to (Evaluate:31May2017); Last Rx:27Jan2017 Ordered   13  Myrbetriq 50 MG Oral Tablet Extended Release 24 Hour; Take one tablet daily; Therapy: 37EJD9499 to (Last Rx:29Mar2017)  Requested for: 29Mar2017 Ordered   14  Neurontin 300 MG Oral Capsule; take 1 capsule daily; Therapy: (Recorded:63Xcc7732) to Recorded   15  Omeprazole 20 MG Oral Capsule Delayed Release; take 1 capsule daily as needed; Therapy: 99MKV5835 to ([phone])  Requested for: 45Mnl6039; Last    Rx:65Uta9768 Ordered   16  ROPINIRole HCl - 2 MG Oral Tablet; Take 1 tablet by mouth at bedtime; Therapy: 71Noz8229 to (Evaluate:30Jun2017)  Requested for: 76RQZ5068; Last    Rx:02Mar2017 Ordered   17  Sertraline HCl - 50 MG Oral Tablet; TAKE 1 TAB BY MOUTH DAILY; Therapy: 97Lfq4891 to (Evaluate:24Jun2017)  Requested for: 02IKG0111; Last    Rx:60Oao7049 Ordered    The medication list was reviewed and updated today  Family Psych History  Mother    1  Family history of bicuspid aortic valve (V17 49) (Z82 79)   2  Family history of depression (V17 0) (Z81 8)   3  Family history of Hypertension (V17 49)   4   Family history of Status post aortic valve replacement  Father    5  Family history of Hypertension (V17 49)   6  Family history of Hypothyroidism   7  No family history of mental disorder  Brother    8  Family history of Hypertension (V17 49)  Maternal Grandmother    9  Family history of Cancer   10  Family history of cerebrovascular accident (V17 1) (Z82 3)   11  Family history of Heart Disease (V17 49)  Paternal Grandmother    15  Family history of Cancer   13  Family history of malignant neoplasm (V16 9) (Z80 9)  Maternal Grandfather    15  Family history of pneumonia (V18 8) (Z83 1)  Paternal Grandfather    13  Family history of pneumonia (V18 8) (Z83 1)  Family History    12  Family history of Arthritis (716 90) (M19 90)   17  Family history of Breast Cancer (V16 3)   18  Family history of osteoporosis (V17 81) (Z82 62)   19  Family history of Hypertension (V17 49)   20  Family history of Transient Ischemic Attack    The family history was reviewed and updated today  Social History    · Caffeine Use   · Currently sexually active   · Disabled   · Former smoker (S67 78) (F56 416)   · Has no children   · High school graduate   · Lives with parents   · Never smoked   · No alcohol use   · Single   · Tea  The social history was reviewed and updated today  The social history was reviewed and is unchanged  No changes as of 6/12/2017      History Of Phys/Sex Abuse Or Perpetration    History Of Phys/Sex Abuse or Perpetration: Pt denies any h/o physical or sexual abuse but felt emotionally neglected due to her mom's mental illness (MDD)  Her maternal grandparents helped take care of Pt when her mom was in the hospital for psychiatric admissions  Parents were  when Pt was 1y/o so was always available  Education    Pt denies any h/o learning disabilities and reached childhood milestones on time as far as she knows  Graduated high school 2002  Completed some college     End of Encounter Meds    1   Fluticasone Propionate 50 MCG/ACT Nasal Suspension; USE 2 SPRAYS IN EACH   NOSTRIL ONCE DAILY; Therapy: 59MXB8154 to (Last Rx:11May2017)  Requested for: 35HEK1380 Ordered    2  Levocetirizine Dihydrochloride 5 MG Oral Tablet; TAKE 1 TABLET BY MOUTH ONCE A   DAY; Therapy: 19Sfm3480 to (Evaluate:21Abt4830)  Requested for: 83ZKS8185; Last   Rx:18May2017 Ordered    3  Sertraline HCl - 25 MG Oral Tablet; TAKE 1 TAB BY MOUTH DAILY      Take with the 50mg tablet; Therapy: 92RUM0425 to (Evaluate:11Aug2017)  Requested for: 12Jun2017; Last   Rx:12Jun2017 Ordered    4  Sertraline HCl - 50 MG Oral Tablet; TAKE 1 TAB BY MOUTH DAILY; Therapy: 08Nwa4933 to (Evaluate:11Aug2017)  Requested for: 12Jun2017; Last   Rx:12Jun2017 Ordered    5  LamoTRIgine 200 MG Oral Tablet; TAKE 2 TABLETS AT BEDTIME; Therapy: 42AEG7799 to (Evaluate:10Oct2017)  Requested for: 12Jun2017; Last   Rx:12Jun2017 Ordered   6  Lithium Carbonate  MG Oral Tablet Extended Release; Take 2 and 1/2 tablets at   bedtime; Therapy: 29JZK3129 to (Evaluate:10Oct2017)  Requested for: 12Jun2017; Last   Rx:12Jun2017 Ordered    7  Latuda 120 MG Oral Tablet; TAKE 1 TABLET IN THE EVENING WITH FOOD; Therapy: 29Xhk9486 to (Ling Bradley)  Requested for: 55EIL1320; Last   Rx:27Jan2017; Status: ACTIVE - Renewal Denied Ordered    8  Omeprazole 20 MG Oral Capsule Delayed Release; take 1 capsule daily as needed; Therapy: 04JLX1808 to (Melissa Bryant)  Requested for: 03 91 12 17 13; Last   Rx:13Bpn4576 Ordered    9  HydrOXYzine HCl - 50 MG Oral Tablet; TAKE 1/2 TO1 TABLET AT BEDTIME AS NEEDED   FOR INSOMNIA; Therapy: 42JMZ6566 to (Evaluate:11Aug2017)  Requested for: 12Jun2017; Last   Rx:12Jun2017 Ordered   10  LORazepam 0 5 MG Oral Tablet; Take 1 tablet 3 times daily  as needed; Therapy: 41Bom3784 to (Evaluate:31May2017); Last Rx:27Jan2017 Ordered    11  Lisinopril 10 MG Oral Tablet; TAKE 1 TABLET BY MOUTH EVERY DAY FOR BLOOD    PRESSURE;     Therapy: 02THF3948 to (Evaluate:55Wfx7393)  Requested for: 44XZW5750; Last    Rx:41Yix1648 Ordered    12  Levothyroxine Sodium 100 MCG Oral Tablet (Synthroid); take 1 tablet by mouth every day; Therapy: 10XCW0055 to (Evaluate:51Etv4805)  Requested for: 75UXB5250; Last    Rx:09Nov2016 Ordered    13  Accu-Chek Martha Device; test 4 times daily; Therapy: 51XDQ1260 to (Last Rx:83Jdt7082)  Requested for: 62ECI6312 Ordered   14  Accu-Chek Martha STRP; TEST 4 TIMES DAILY; Therapy: 63QYE0088 to (Evaluate:46Xnq6633)  Requested for: 49DBT4459; Last    Rx:45Fle4926 Ordered   15  Accu-Chek Soft Touch Lancets Miscellaneous; test blood sugar four times daily; Therapy: 70JJG5723 to (Evaluate:13Apr2016)  Requested for: 10TRX1385; Last    Rx:67Vxx1465 Ordered    16  ROPINIRole HCl - 2 MG Oral Tablet; Take 1 tablet by mouth at bedtime; Therapy: 98Cie3101 to (Evaluate:30Jun2017)  Requested for: 03DNT0740; Last    Rx:02Mar2017 Ordered    17  Myrbetriq 50 MG Oral Tablet Extended Release 24 Hour; Take one tablet daily; Therapy: 56FPG3875 to (Last Rx:29Mar2017)  Requested for: 29Mar2017 Ordered    18  Neurontin 300 MG Oral Capsule (Gabapentin); take 1 capsule daily;     Therapy: (Recorded:64Uex7377) to Recorded    Future Appointments    Date/Time Provider Specialty Site   07/21/2017 02:30 PM SCOTTIE Khan Psychiatry [place] [address]   06/13/2017 02:45 PM Nurse, Urology [address] [place] [address]   06/20/2017 02:45 PM Nurse, Urology Robert  [address]   06/27/2017 03:00 PM Nurse, Urology [address]   07/06/2017 03:00 PM Nurse, Urology [address]   07/11/2017 03:00 PM Nurse Urology [address]   07/18/2017 03:00 PM Nurse, Urology [address]   07/25/2017 03:00 PM Nurse, Urology [address] [facility]   08/01/2017 03:00 PM Nurse, Urology [address] [facility] [place]   06/13/2017 05:00 PM [place], Patricia Wicke [address] [facility]   06/15/2017 04:00 PM Dionne Pineda Anupam, [address] [facility]   06/20/2017 05:00 PM Edwin Dionne Bo, [address] [facility]   06/22/2017 01:00 PM [place], Dionne Anupam, [address] [facility]   06/27/2017 05:00 PM [place], Dionne Anupam, [address] [facility]   06/29/2017 01:00 PM [place], Dionne Bo, [place]   07/06/2017 10:00 AM Edwin Dionne Bo, [address] [facility]   07/11/2017 05:00 PM [facility]   07/13/2017 10:00 AM Edwin Dionne Anupam, [address] [facility]   07/18/2017 05:00 PM Cici Nimco  [facility]   07/20/2017 08:00 AM Dionne Pineda [place], [address] [facility]   07/27/2017 10:00 AM Edwin Dionne Anupam, [address] [facility]   08/04/2017 11:00 AM [facility]   08/10/2017 11:00 AM [facility]   08/18/2017 11:00 AM [facility]   08/24/2017 11:00 AM [facility]   08/31/2017 11:00 AM Dionne Pineda [address] [facility]   06/26/2017 08:30 AM Elvi Saucedo, [facility] [address]     Signatures   Electronically signed by : Hardy Boeck, RPA-C; Jun 12 2017  2:24PM EST                       (Author)    Electronically signed by : PAUL Ruiz ; Jun 12 2017  2:54PM EST                       (Author)

## 2018-01-15 NOTE — PSYCH
Progress Note  Psychotherapy Provided Collin Simons:   Goals addressed in session:   1 D: PT  was seen for Group Therapy session  PT engaged in dialogue with other group members re: their struggles in managing their binging symptoms  A: Elizabeth provided support to another member who is struggling while also sharing how her own health, mental and physical, have been impacted by her family interactions and dysfunction  P: Pt will continue to attend individual therapy once a week and the Adult ED group on a biweekly basis  Pain Scale and Suicide Risk [facility]: Current Pain Assessment: moderate to severe   On a scale of 0 to 10, the patient rates current pain at 5   Behavioral Health Treatment Plan Collin Simons: Diagnosis and Treatment Plan explained to patient, patient relates understanding diagnosis and is agreeable to Treatment Plan  Assessment    1   Eating disorder, unspecified (307 32) (J94 5)    Signatures   Electronically signed by : Rand Thompson LCSW; Jun 22 2016  8:48AM EST                       (Author)

## 2018-01-15 NOTE — PSYCH
Progress Note  Psychotherapy Provided [facility]: Individual Psychotherapy 50 minutes and Telephone Contact provided today  Goals addressed in session:   1: Pt was seen today for Individual Therapy session  Elizabeth spoke about her feelings of a lack of self worth  She engaged in a values clarification exercise / discussion with this worker for the remainder of the session  A: Elizabeth again allowed this worker to provide her with validation and support  She continues to make progress in addressing her issues even though it is difficult for her  P:Elizabeth will continue to attend Individual and Group therapy on a weekly basis  Pain Scale and Suicide Risk [facility]: On a scale of 0 to 10, the patient rates current pain at 3   Current suicide risk is low   Behavioral Health Treatment Plan [facility]: Diagnosis and Treatment Plan explained to patient, patient relates understanding diagnosis and is agreeable to Treatment Plan  Assessment    1  Bipolar 1 disorder, depressed, moderate (296 52) (F31 32)   2  Gambling disorder, moderate (312 31) (F63 0)   3   Obsessive-compulsive disorder (300 3) (F42)    Signatures   Electronically signed by : Tawanna Padilla LCSW; Sep  2 2016 11:37AM EST                       (Author)

## 2018-01-15 NOTE — RESULT NOTES
Verified Results  (1) LITHIUM [id number] 07:05AM Seng Smith     Test Name Result Flag Reference   LITHIUM 0 6 mmol/L  0 5-1 0

## 2018-01-15 NOTE — PSYCH
Progress Note  Psychotherapy Provided [facility]: Group Therapy provided today  Goals addressed in session:     1 D: Pt attended Trauma Group  Pts engaged in a discussion on the topic re: the correlation between OCD, hoarding, and PTSD  A: Elizabeth was once again shocked to learn that (an)other group member experiences the same struggles she does with the above issues  P: Pt will continue to be seen for individual and group therapy during which the above will be further processed  Pain Scale and Suicide Risk [facility]: On a scale of 0 to 10, the patient rates current pain at 5   Behavioral Health Treatment Plan [facility]: Diagnosis and Treatment Plan explained to patient, patient relates understanding diagnosis and is agreeable to Treatment Plan  Assessment    1   EILEEN (generalized anxiety disorder) (300 02) (F41 1)    Signatures   Electronically signed by : Jeet Lay LCSW; Jan 26 2017 10:03AM EST                       (Author)

## 2018-01-15 NOTE — PSYCH
Psych Med Mgmt    Appearance: was calm and cooperative, adequate hygiene and grooming and good eye contact  Observed mood: depressed and anxious  Observed mood: affect was constricted  Speech: a normal rate  Thought processes: coherent/organized  Hallucinations: no hallucinations present  Thought Content: no delusions  Abnormal Thoughts: The patient has obsessive thought content, but no suicidal thoughts and no homicidal thoughts  Orientation: The patient is oriented to person, place and time  Recent and Remote Memory: short term memory intact and long term memory intact  Attention Span And Concentration: concentration impaired  Insight: Insight intact  Judgment: Her judgment was intact  Muscle Strength And Tone  Muscle strength and tone were normal  Normal gait and station  Language: no difficulty naming common objects  Fund of knowledge: Patient displays adequate knowledge of current events, adequate fund of knowledge regarding past history and adequate fund of knowledge regarding vocabulary  The patient is experiencing no localized pain  Treatment Recommendations: Continue Lithium 900 mg at bedtime  Continue Ativan 0 5 mg tid PRN  Continue Abilify 30 mg at bedtime  Change Lamictal 400 mg daily and at bedtime due to tiredness  Increase Prozac 40 mg daily  Continue therapy with Rand Thompson; also attends eating disorders group  Follows with PCP for glucose and lipid monitoring  Risks, Benefits And Possible Side Effects Of Medications: Risks, benefits, and possible side effects of medications explained to patient and patient verbalizes understanding, Risks of medications explained if female patient  Patient verbalizes understanding and agrees to notify her doctor if she becomes pregnant  She reports increased appetite, decreased energy, recent 3 lbs weight gain  and increase in number of sleep hours 12       Patient states she has been feeling less depressed on Anh  Still has anxiety and obsessive thoughts, but feels obsessive thoughts are less frequent  Concerned she still has obsessive thoughts about buying lottery tickets  No suicidality or homicidality  No psychotic symptoms  No side effects from medications reported  No rash  Vitals  Signs [Data Includes: Current Encounter]   Recorded: [id number] 04:31PM   Heart Rate: 75  Systolic: 386  Diastolic: 76  BP Cuff Size: Extra-Large  Height: 5 ft 8 in  Weight: 367 lb   BMI Calculated: 55 8  BSA Calculated: 2 65    Assessment    1  Bipolar I disorder, most recent episode depressed, moderate (296 52) (F31 32)   2  Eating disorder, unspecified (307 50) (F50 9)   3  EILEEN (generalized anxiety disorder) (300 02) (F41 1)   4  Obsessive-compulsive disorder (300 3) (F42)    Plan    1  From  LamoTRIgine 200 MG Oral Tablet TAKE 1 TABLET TWICE DAILY To   LamoTRIgine 200 MG Oral Tablet TAKE 2 TABLETS AT BEDTIME   2  ARIPiprazole 30 MG Oral Tablet (Abilify); TAKE 1 TABLET AT BEDTIME   3  Lithium Carbonate  MG Oral Tablet Extended Release; TAKE 2 TABLETS   AT BEDTIME    4  Follow-up visit in 5 weeks Evaluation and Treatment  Follow-up  Status: Hold For -   Scheduling  Requested for: 89DWX1862    5  FLUoxetine HCl - 40 MG Oral Capsule; TAKE 1 CAPSULE DAILY    6  FLUoxetine HCl - 20 MG Oral Tablet    7  LORazepam 0 5 MG Oral Tablet; Take 1 tablet 3 times daily  as needed    Review of Systems    Constitutional: recent 3 lb weight gain  Cardiovascular: no complaints of slow or fast heart rate, no chest pain, no palpitations  Respiratory: no complaints of shortness of breath, no wheezing, no dyspnea on exertion  Gastrointestinal: no complaints of abdominal pain, no constipation, no nausea, no diarrhea, no vomiting  Genitourinary: no complaints of dysuria, no incontinence, no pelvic pain, no urinary frequency  Musculoskeletal: no complaints of arthralgia, no myalgias, no limb pain, no joint stiffness     Integumentary: no complaints of skin rash, no itching, no dry skin  Neurological: no complaints of headache, no confusion, no numbness, no dizziness  Past Psychiatric History    Past Psychiatric History: Multiple past inpatient psychiatric admissions  One past suicide attempt by overdose in 2009  Active Problems    1  Abnormal weight gain (783 1) (R63 5)   2  Acute diverticulitis (562 11) (K57 92)   3  Acute pain (338 19) (R52)   4  Allergic rhinitis (477 9) (J30 9)   5  Amenorrhea (626 0) (N91 2)   6  Bipolar I disorder, most recent episode depressed, moderate (296 52) (F31 32)   7  Bulging lumbar disc (722 10) (M51 26)   8  Dizziness (780 4) (R42)   9  Dysfunctional uterine bleeding (626 8) (N93 8)   10  Dysuria (788 1) (R30 0)   11  Eating disorder, unspecified (307 50) (F50 9)   12  Encounter for routine gynecological examination (V72 31) (Z01 419)   13  Esophageal reflux (530 81) (K21 9)   14  Foot pain (729 5) (M79 673)   15  EILEEN (generalized anxiety disorder) (300 02) (F41 1)   16  Gambling disorder, moderate (312 31) (F63 0)   17  Hypertension (401 9) (I10)   18  Hypothyroidism (244 9) (E03 9)   19  Lumbago (724 2) (M54 5)   20  Lumbar degenerative disc disease (722 52) (M51 36)   21  Menorrhagia (626 2) (N92 0)   22  Morbid or severe obesity due to excess calories (278 01) (E66 01)   23  Obsessive-compulsive disorder (300 3) (F42)   24  Obstructive sleep apnea (327 23) (G47 33)   25  Otalgia, unspecified laterality (388 70) (H92 09)   26  Prediabetes (790 29) (R73 09)   27  Suicide Attempt (E958 9)   28  Viral bronchitis (466 0) (J20 8)   29  Vitamin D deficiency (268 9) (E55 9)    Past Medical History    1  History of Abscess of face (682 0) (L02 01)   2  History of Acute nonsuppurative otitis media, unspecified laterality (381 00) (H65 199)   3  History of Backache (724 5) (M54 9)   4  History of Candidiasis, cutaneous (112 3) (B37 2)   5  History of Dog bite (879 8,E906 0) (T14 8,W54  0XXA)   6   History of Dysfunction of left Eustachian tube (381 81) (H69 82)   7  History of acute sinusitis (V12 69) (Z87 09)   8  Denied: History of head injury   9  History of Jaw pain (784 92) (R68 84)   10  History of Pseudotumor cerebri (348 2) (G93 2)   11  Denied: History of Seizures    The active problems and past medical history were reviewed and updated today  Allergies    1  No Known Drug Allergies    Current Meds   1  Accu-Chek Martha Device; test 4 times daily; Therapy: 71QEU9886 to (Last Rx:29Cfb6424)  Requested for: 53LSK5660 Ordered   2  Accu-Chek Martha In Vitro Strip; TEST 4 TIMES DAILY; Therapy: 65SPB9346 to (Evaluate:08You7798)  Requested for: 06SBN5113; Last   Rx:33Xoq6103 Ordered   3  Accu-Chek Soft Touch Lancets Miscellaneous; test blood sugar four times daily; Therapy: 06TUX0827 to (Evaluate:47Rdq7995)  Requested for: 60RMH9147; Last   Rx:64Fme7522 Ordered   4  ARIPiprazole 30 MG Oral Tablet; TAKE 1 TABLET AT BEDTIME  Requested for:   43SYR7219; Last Rx:09Oct2015; Status: ACTIVE - Renewal Denied Ordered   5  Ciprofloxacin HCl - 500 MG Oral Tablet; Therapy: 03HDS2548 to Recorded   6  FLUoxetine HCl - 20 MG Oral Tablet; TAKE 1 TABLET DAILY; Therapy: 40QDZ8873 to (Evaluate:17Mar2016)  Requested for: 91WKA9124; Last   Rx:11Afo0637 Ordered   7  Hydrocodone-Acetaminophen 5-325 MG Oral Tablet; TAKE 1 TABLET 3 times daily PRN   as needed for pain; Therapy: 63IVY8273 to (Evaluate:27Uev1781); Last Rx:97Wjc5479 Ordered   8  LamoTRIgine 200 MG Oral Tablet; TAKE 1 TABLET TWICE DAILY; Therapy: 21JXO3161 to (Evaluate:16Ufp9972)  Requested for: 44PER3371; Last   Rx:27Jtf1440 Ordered   9  Lansoprazole 15 MG Oral Capsule Delayed Release; take 1 capsule daily; Last   Rx:07Zqh8048 Ordered   10  Levocetirizine Dihydrochloride 5 MG Oral Tablet; take 1 tablet by mouth every day; Therapy: 43FGA0062 to (Jefrfey Mckeon)  Requested for: 34XAN2583; Last    Rx:04Jan2016 Ordered   11   Levothyroxine Sodium 100 MCG Oral Tablet; take 1 tablet by mouth every day; Therapy: 02NNE2215 to (Evaluate:12Nkx1956)  Requested for: 74CYZ7233; Last    Rx:03Nov2015 Ordered   12  Lisinopril 10 MG Oral Tablet; TAKE 1 TABLET BY MOUTH EVERY DAY FOR BLOOD    PRESSURE; Therapy: 54WVM7528 to (Deanne Rai)  Requested for: 80CRW3829; Last    Rx:22Emz5036 Ordered   13  Lithium Carbonate  MG Oral Tablet Extended Release; TAKE 2 TABLETS AT    BEDTIME; Therapy: 85SXY6493 to (Evaluate:51Gbe1295)  Requested for: 25GTZ4007; Last    Rx:09Oct2015 Ordered   14  LORazepam 0 5 MG Oral Tablet; Take 1 tablet 3 times daily  as needed; Therapy: 53Kpu8271 to (Evaluate:06Feb2016); Last Rx:09Oct2015 Ordered   15  MetFORMIN HCl  MG Oral Tablet Extended Release 24 Hour; 1 tab daily with    supper; Therapy: 52JQP4955 to (Evaluate:03Jan2016)  Requested for: 79EYN3440; Last    Rx:70Tuu7127 Ordered   16  MetroNIDAZOLE 500 MG Oral Tablet; TAKE 1 TABLET EVERY 6 HOURS DAILY; Therapy: 93GND2696 to Recorded   17  Nitrofurantoin Monohyd Macro 100 MG Oral Capsule; TAKE 1 CAPSULE EVERY 12    HOURS DAILY; Therapy: 13TWY8150 to (Serge Deleon)  Requested for: 23RGF9416; Last    Rx:37Ijm0831 Ordered    The medication list was reviewed and updated today  Family Psych History    1  Family history of bicuspid aortic valve (V17 49) (Z82 79)   2  Family history of depression (V17 0) (Z81 8)   3  Family history of Hypertension (V17 49)   4  Family history of Status post aortic valve replacement    5  Family history of Hypertension (V17 49)   6  Family history of Hypothyroidism   7  No family history of mental disorder    8  Family history of Hypertension (V17 49)    9  Family history of Cancer   10  Family history of cerebrovascular accident (V17 1) (Z82 3)   11  Family history of Heart Disease (V17 49)    12  Family history of Cancer   13  Family history of malignant neoplasm (V16 9) (Z80 9)    14   Family history of pneumonia (V18 8) (Z83 1) 13  Family history of pneumonia (V18 8) (Z83 1)    16  Family history of Arthritis (716 90) (M19 90)   17  Family history of Breast Cancer (V16 3)   18  Family history of osteoporosis (V17 81) (Z82 62)   19  Family history of Hypertension (V17 49)   20  Family history of Transient Ischemic Attack    The family history was reviewed and updated today  Social History    · Caffeine Use   · Currently sexually active   · Disabled   · Former smoker (E24 81) (A15 793)   · High school graduate   · Lives with parents   · Never smoked   · No alcohol use   · Tea  The social history was reviewed and updated today  The social history was reviewed and is unchanged  Single, lives with mother and stepfather  No children  Some college  On disability  Worked in past in retail  No history of legal problems  No  history  History Of Phys/Sex Abuse Or Perpetration    History Of Phys/Sex Abuse or Perpetration: No history of physical or sexual abuse  End of Encounter Meds    1  Ciprofloxacin HCl - 500 MG Oral Tablet; Therapy: 11TIP4461 to Recorded   2  MetroNIDAZOLE 500 MG Oral Tablet; TAKE 1 TABLET EVERY 6 HOURS DAILY; Therapy: 48AAO4613 to Recorded    3  Levocetirizine Dihydrochloride 5 MG Oral Tablet; take 1 tablet by mouth every day; Therapy: 35VAE0135 to ([employer] [employer])  Requested for: 58OPH3335; Last   Rx:04Jan2016 Ordered    4  ARIPiprazole 30 MG Oral Tablet (Abilify); TAKE 1 TABLET AT BEDTIME  Requested for:   40GFQ2090; Last Rx:11Jan2016 Ordered   5  LamoTRIgine 200 MG Oral Tablet; TAKE 2 TABLETS AT BEDTIME; Therapy: 70DRN5803 to (Evaluate:40Znl6123)  Requested for: 46IID7279; Last   Rx:11Jan2016 Ordered   6  Lithium Carbonate  MG Oral Tablet Extended Release; TAKE 2 TABLETS AT   BEDTIME; Therapy: 22LXO5465 to (Evaluate:65Muv4748)  Requested for: 49JGR3607; Last   Rx:11Jan2016 Ordered    7  FLUoxetine HCl - 40 MG Oral Capsule; TAKE 1 CAPSULE DAILY;    Therapy: 64YLQ4557 to (Evaluate:10May2016); Last Rx:11Jan2016 Ordered    8  Nitrofurantoin Monohyd Macro 100 MG Oral Capsule; TAKE 1 CAPSULE EVERY 12   HOURS DAILY; Therapy: 03UKD0544 to (Heddy Space)  Requested for: 26SMU7111; Last   Rx:34Fml9384 Ordered    9  Lansoprazole 15 MG Oral Capsule Delayed Release (Prevacid); take 1 capsule daily; Last Rx:60Ryg3242 Ordered    10  LORazepam 0 5 MG Oral Tablet; Take 1 tablet 3 times daily  as needed; Therapy: 06Ypj1662 to (Evaluate:10May2016); Last Rx:11Jan2016 Ordered    11  Lisinopril 10 MG Oral Tablet; TAKE 1 TABLET BY MOUTH EVERY DAY FOR BLOOD    PRESSURE; Therapy: 65PZW1862 to (Yana Flair)  Requested for: 89IYQ0200; Last    Rx:99Brf7048 Ordered    12  Levothyroxine Sodium 100 MCG Oral Tablet (Synthroid); take 1 tablet by mouth every day; Therapy: 69XZS3908 to (Evaluate:32Tcd3552)  Requested for: 38ETT3118; Last    Rx:03Nov2015 Ordered    13  Hydrocodone-Acetaminophen 5-325 MG Oral Tablet; TAKE 1 TABLET 3 times daily PRN    as needed for pain; Therapy: 48UTE4283 to (Evaluate:28Nov2014); Last Rx:31Oct2014 Ordered    14  Accu-Chek Martha Device; test 4 times daily; Therapy: 03LVE3925 to (Last Rx:33Ktp4047)  Requested for: 39ZFJ8712 Ordered   15  Accu-Chek Martha In Vitro Strip; TEST 4 TIMES DAILY; Therapy: 52LAD1743 to (Evaluate:90Kei5644)  Requested for: 28VMK6353; Last    Rx:17Yof1764 Ordered   16  Accu-Chek Soft Touch Lancets Miscellaneous; test blood sugar four times daily; Therapy: 62LTQ6694 to (Evaluate:13Apr2016)  Requested for: 60AKC9851; Last    Rx:27Tkv0131 Ordered   17  MetFORMIN HCl  MG Oral Tablet Extended Release 24 Hour; 1 tab daily with    supper; Therapy: 67FKK9640 to (Evaluate:03Jan2016)  Requested for: 99NSU1707;  Last    Rx:08Vhh1184 Ordered    Future Appointments    Date/Time Provider Specialty Site   01/18/2016 12:00 PM Katt Barreto  [facility]ISTS   03/14/2016 12:00 PM Yamila Kilgore [facility] PSYCH ASSOC THERAPISTS   03/21/2016 12:00 PM [facility] TH[facility]   03/28/2016 12:00 PM [facility]   12/09/2016 08:10 AM PAUL Sadelr  Endocrinology [facility] ENDOCRINOLOGY   02/24/2016 02:30 PM PAUL May   Psychiatry [facility]   01/14/2016 01:00 PM Bárbara Pineda, [address]     Signatures   Electronically signed by : PAUL Crowder ; Jan 11 2016  4:45PM EST                       (Author)

## 2018-01-15 NOTE — PSYCH
Progress Note  Psychotherapy Provided [facility]: Individual Psychotherapy 50 minutes provided today  Goals addressed in session:   1-3 D: Met with pt for Individual Therapy session  Elizabeth spoke today about her feelings re: her increased suicidality since her last psych appt during which her provider recommended she seek out placement in a group home  She verbalized that this is "her worst fear," and this validated why she feels hopeless and despondent about her future  A: Elizabeth was able to emote today as she verbalized re: the above  She denied intent or a plan, but acknowledges that her hopelessness has led to her reluctance to work more diligently on her binging  She continues to recognize that the perseverating thoughts are her biggest struggle  P: Upcoming sessions will be used to continue to address the above  She will continue to attend both weekly Trauma and ED group therapy sessions  Pain Scale and Suicide Risk  [facility]: Current Pain Assessment: moderate to severe   On a scale of 0 to 10, the patient rates current pain at 5   Current suicide risk is moderate (see risk assessment checklist)   Behavioral Health Treatment Plan [facility]: Diagnosis and Treatment Plan explained to patient, patient relates understanding diagnosis and is agreeable to Treatment Plan  Assessment    1  Binge-eating disorder, severe (783 6) (F50 81)   2  Bipolar 1 disorder, depressed, moderate (296 52) (F31 32)   3  Obsessive-compulsive disorder (300 3) (F42 9)   4   EILEEN (generalized anxiety disorder) (300 02) (F41 1)    Signatures   Electronically signed by : [facility]; Jun 22 2017 12:57PM EST                       (Author)

## 2018-01-15 NOTE — PSYCH
Progress Note  Psychotherapy Provided [facility]: Individual Psychotherapy 50 minutes provided today  Goals addressed in session:   1-3 D: Met with pt for Individual Therapy session  Elizabeth spoke today about her feelings re: her inability to sleep for more than 2 hours at a time for the past 5 years regardless of the medication she takes  She elaborated on this and the hours that she begin her "list completion" each day  A: Jimmie Cortés has continued to realize how mentally unwell her mother is and how this has contributed / prevented her mental health  Elizabeth struggles most with managing her own ocd / celia  P: Upcoming sessions will be used to continue to address the above  She will continue to attend both weekly Trauma and ED group therapy sessions  Pain Scale and Suicide Risk [facility]: Current Pain Assessment: moderate to severe   On a scale of 0 to 10, the patient rates current pain at 5   Behavioral Health Treatment Plan [facility]: Diagnosis and Treatment Plan explained to patient, patient relates understanding diagnosis and is agreeable to Treatment Plan  Assessment    1  Binge-eating disorder, severe (783 6) (F50 81)   2  Bipolar I disorder, most recent episode mixed, moderate (296 62) (F31 62)   3  EILEEN (generalized anxiety disorder) (300 02) (F41 1)   4   Gambling disorder, moderate (312 31) (F63 0)    Signatures   Electronically signed by : Scarlet Vivas LCSW; Mar 30 2017  2:52PM EST                       (Author)

## 2018-01-15 NOTE — PSYCH
Progress Note  Psychotherapy Provided [facility]: Individual Psychotherapy 50 minutes provided today  Goals addressed in session:   1-3 D: Met with pt for Individual Therapy session  Elizabeth spoke today about her feelings re: her recent interactions with her mother and stepfather  Her eating disorder and self deprecating thought patterns were also addressed  A: Elizabeth became more negative as the session progressed  She struggles with "forward movement" in that she become extremely fearful re: her future as well as negaitve about her functioning level  P: Upcoming sessions will be used to continue to address the above  She will continue to attend both weekly Trauma and ED group therapy sessions  Pain Scale and Suicide Risk [facility]: Current Pain Assessment: moderate to severe   On a scale of 0 to 10, the patient rates current pain at 3   Behavioral Health Treatment Plan [address] [place] [address] [address]: Diagnosis and Treatment Plan explained to patient, patient relates understanding diagnosis and is agreeable to Treatment Plan  Assessment    1  Binge-eating disorder, severe (783 6) (F50 81)   2  Bipolar 1 disorder, depressed, moderate (296 52) (F31 32)   3  EILEEN (generalized anxiety disorder) (300 02) (F41 1)   4   Obsessive-compulsive disorder (300 3) (F42 9)    Signatures   Electronically signed by : Crystal Damico LCSW; Jun 8 2017 11:45AM EST                       (Author)

## 2018-01-15 NOTE — PSYCH
Progress Note  Psychotherapy Provided [facility]: Group Therapy provided today  Goals addressed in session:   D: Pt  was seen for Group Therapy session  Pts engaged in a discussion re: impact of shame on ED  A: Elizabeth shared her mother had bought lottery tickets and gotten upset about it  She was receptive to feedback from peers and could relate to other's experiences  P: Keven Hernandez will continue to attend individual therapy once a week and the Adult ED group on a biweekly basis  Pain Scale and Suicide Risk [facility]: Current Pain Assessment: no pain   On a scale of 0 to 10, the patient rates current pain at 0   Current suicide risk is low   Assessment    1   Eating disorder, unspecified (307 50) (H97 2)    Signatures   Electronically signed by : Albert Figueredo LCSW; Mar 18 2016  8:04AM EST                       (Author)

## 2018-01-15 NOTE — PSYCH
Progress Note  Psychotherapy Provided [facility]: Group Therapy provided today  Goals addressed in session:   1 D: PT  was seen for Group Therapy session  Pts engaged in a therapeutic activity / discussion re: goal setting today  A: Elizabeth struggled considerably with this activity but with prompting, participated in the group discussion  P: Pt will be encouraged to continue to attend individual therapy weekly and the Adult ED group on a biweekly basis  Pain Scale and Suicide Risk [facility]: On a scale of 0 to 10, the patient rates current pain at 5   Current suicide risk is low   Behavioral Health Treatment Plan [address] [place] [address] [address]: Diagnosis and Treatment Plan explained to patient, patient relates understanding diagnosis and is agreeable to Treatment Plan  Assessment    1   Binge-eating disorder, severe (963 6) (V35 75)    Signatures   Electronically signed by : Nael Elena, BRYNN; Apr 28 2017  1:20PM EST                       (Author)

## 2018-01-15 NOTE — PSYCH
Progress Note  Psychotherapy Provided [facility]: Individual Psychotherapy 50 minutes provided today  Goals addressed in session:   1-3 D: Met with Elizabeth for Individual Therapy session  Elizabeth spoke more today about her feelings re: her nightime eating  She was weighed and has gained since last week  She agreed to return to taking her Atarax as prescribed as she has not been doing so and has been eating 3 times each night  A: Neetu Castro has continued to evidence more willingness to work on issues and state less hopelessness  She is in the process of having her dog approved as an AINSLEY at this time  P: Upcoming sessions will be used to continue to address the above  She will continue to attend both weekly Trauma and ED group therapy sessions  Pain Scale and Suicide Risk [facility]: Current Pain Assessment: moderate to severe   On a scale of 0 to 10, the patient rates current pain at 5   Current suicide risk is moderate (see risk assessment checklist)   Behavioral Health Treatment Plan [facility]: Diagnosis and Treatment Plan explained to patient, patient relates understanding diagnosis and is agreeable to Treatment Plan  Assessment    1  Bipolar 1 disorder, depressed, moderate (296 52) (F31 32)   2  Binge-eating disorder, severe (783 6) (F50 81)   3   Obsessive-compulsive disorder (300 3) (F42 9)    Signatures   Electronically signed by : Kosta Barahona LCSW; Aug  4 2017  4:01PM EST                       (Author)

## 2018-01-15 NOTE — PSYCH
Progress Note  Psychotherapy Provided [facility]: Group Therapy provided today  Goals addressed in session:   D: Pt  was seen for Group Therapy session  Pts engaged in a discussion re: stigma of mental health treatment, participant's personal history with ED, and with participants experience with treatment  A: Elizabeth shared with the group about her frustration with her stepfather  She discussed and showed a new tattoo she got of a semi-colon  Elizabeth was receptive and responsive to questions about ED and treatment that new member had  P: Nicole Oden will continue to attend individual therapy on a weekly basis and the Adult ED group on a biweekly basis  Pain Scale and Suicide Risk [facility]: Current Pain Assessment: no pain   On a scale of 0 to 10, the patient rates current pain at 0   Current suicide risk is low   Behavioral Health Treatment Plan [employer]: Diagnosis and Treatment Plan explained to patient, patient relates understanding diagnosis and is agreeable to Treatment Plan  Assessment    1   Eating disorder, unspecified (307 14) (U43 9)    Signatures   Electronically signed by : Karuna Paige, ; Jan 21 2016  3:34PM EST                       (Author)    Electronically signed by : Karuna Paige, ; Jan 21 2016  3:38PM EST                       (Author)

## 2018-01-15 NOTE — PSYCH
Progress Note  Psychotherapy Provided [facility]: Individual Psychotherapy 50 minutes provided today  Goals addressed in session:   1-3 D: Met with Elizabeth for Individual Therapy session  Elizabeth spoke today about her feelings re: her ED thoughts and self punitive restricting of food intake "until her blood sugar is low enough" to "deserve" to eat  This was processed as was her discomfort around her father  A: Elizabeth was much less depressed in presentation today  She acknowledged that it is not healthy for her to spend so much time in her thoughts and agreed to utilize alternative techniques presented today to spend less time in them  P: Upcoming sessions will be used to continue to address the above  She will continue to attend both weekly Trauma and ED group therapy sessions  Pain Scale and Suicide Risk [facility]: Current Pain Assessment: moderate to severe   On a scale of 0 to 10, the patient rates current pain at 3   Current suicide risk is low   Behavioral Health Treatment Plan [facility]: Diagnosis and Treatment Plan explained to patient, patient relates understanding diagnosis and is agreeable to Treatment Plan  Assessment    1  EILEEN (generalized anxiety disorder) (300 02) (F41 1)   2  Obsessive-compulsive disorder (300 3) (F42 9)   3  Severe bipolar I disorder, most recent episode depressed (296 53) (F31 4)   4   Binge-eating disorder, severe (783 6) (F50 81)    Signatures   Electronically signed by : Mindy Perez LCSW; Sep 22 2017 11:59AM EST                       (Author)

## 2018-01-15 NOTE — PSYCH
Risk Assessment    The following ratings are based on my observation of this patient over the last day  Risk of Harm to Self:   Demographic risk factors include , alaskan, or native [place]  Historical Risk Factors include: chronic psychiatric problems  Recent Specific Risk Factors include: experienced persistent ideation, sense of hopelessness/helplessness, unable to visualize a realistic positive future, feelings of guilt or self blame, worries about finances or work, chronic pain or health problems and diagnosis of depression  These risk factors presented within the last year  Risk of Harm to Others:   Demographic Risk Factors include: unemployed  Historical Risk Factors include: victim of childhood bullying  Recent Specific Risk Factors include: concomitant mood or thought disorder  Based on the above information, the client presents the following risk of harm to self or others: moderate  The following interventions are recommended: no intervention changes  Notes regarding this Risk Assessment: Pt is well known to this writer  Pt denies intent, plan at this time  Active Problems    1  Abnormal weight gain (783 1) (R63 5)   2  Acute bronchitis (466 0) (J20 9)   3  Acute diverticulitis (562 11) (K57 92)   4  Acute frontal sinusitis (461 1) (J01 10)   5  Acute pain (338 19) (R52)   6  Allergic rhinitis (477 9) (J30 9)   7  Amenorrhea (626 0) (N91 2)   8  Binge-eating disorder, severe (783 6) (F50 81)   9  Bipolar 1 disorder, depressed, moderate (296 52) (F31 32)   10  Bronchitis (490) (J40)   11  Bulging lumbar disc (722 10) (M51 26)   12  Cough (786 2) (R05)   13  Dysuria (788 1) (R30 0)   14  Esophageal reflux (530 81) (K21 9)   15  Foot pain (729 5) (M79 673)   16  EILEEN (generalized anxiety disorder) (300 02) (F41 1)   17  Gambling disorder, moderate (312 31) (F63 0)   18  Hypertension (401 9) (I10)   19  Hypothyroidism (244 9) (E03 9)   20   Long term current use of therapeutic drug (V58 69) (Z79 899)   21  Lumbago (724 2) (M54 5)   22  Lumbar degenerative disc disease (722 52) (M51 36)   23  Morbid or severe obesity due to excess calories (278 01) (E66 01)   24  Nocturnal enuresis (788 36) (N39 44)   25  Obsessive-compulsive disorder (300 3) (F42 9)   26  Overactive bladder (596 51) (N32 81)   27  Prediabetes (790 29) (R73 09)   28  RLS (restless legs syndrome) (333 94) (G25 81)   29  Urinary incontinence, unspecified type (788 30) (R32)   30  Vitamin D deficiency (268 9) (E55 9)    Past Medical History    1  History of Abscess of face (682 0) (L02 01)   2  History of Acute nonsuppurative otitis media, unspecified laterality (381 00) (H65 199)   3  History of Anorexia nervosa in remission (307 1) (F50 00)   4  History of Backache (724 5) (M54 9)   5  History of Candidiasis, cutaneous (112 3) (B37 2)   6  History of Dog bite (879 8,E906 0) (W54  0XXA)   7  History of Dysfunction of left eustachian tube (381 81) (H69 82)   8  History of Dysuria (788 1) (R30 0)   9  History of acute sinusitis (V12 69) (Z87 09)   10  Denied: History of head injury   11  History of Pseudotumor cerebri (348 2) (G93 2)   12  Denied: History of Seizures   13   History of Suicide and self-inflicted injury (U973 6) (D83 3LTM)    Future Appointments    Date/Time Provider Specialty Site   06/26/2017 08:30 AM Harsha Isaacs [address] [place] [address]   06/27/2017 03:00 PM Nurse, Urology [facility]   06/27/2017 05:00 PM Rosendo Pineda [place]   06/29/2017 01:00 PM [facility]   07/06/2017 10:00 AM Rosendo Pineda, y [address] [facility]   07/06/2017 11:15 AM Nurse, Urology [facility]   07/06/2017 03:00 PM Nurse, Urology Robert [facility] [address]   07/11/2017 03:00 PM Nurse, Urology [place]   [facility]OLOG[facility] [place]   07/11/2017 05:00  [address], [place], [address] [facility]   07/13/2017 10:00  [address] [place], [address] [facility]   07/18/2017 03:00 PM Nurse, Urology Serge ROME [address] [facility]   07/18/2017 05:00 PM [address] [facility]   07/20/2017 08:00  [address], [place], [address] [facility]   07/21/2017 02:30 PM Froylan Sandhu RPA-SARA Psychiatry [facility]   07/25/2017 03:00 PM Nurse, Urology Serge Saenz   [address] [facility]   07/25/2017 05:00 PM [address] [facility]   07/27/2017 10:00  [address], [place], [address] [facility]   08/01/2017 03:00 PM Nurse, Urology Serge ROME [address] [facility]   08/01/2017 05:00 PM [address] [facility]   08/04/2017 11:00  [address], [place], [address] [facility]   08/08/2017 05:00  [address], [place], [address] [facility]   08/10/2017 11:00  [address], [place], [address] [facility]   08/15/2017 05:[address]  [address], [place], [address] [facility]   08/18/2017 11:00  [address], [place], [address] [facility]   08/22/2017 05:00 PM [facility]   08/24/2017 11:00 AM [place], [address] [facility]   08/29/2017 05:00 PM [facility]   08/31/2017 11:00  [address] PSYCH [facility]   09/05/2017 05:00  [address] [place]     Signatures   Electronically signed by : Ike Velazquez LCSW; Jun 22 2017 12:52PM EST                       (Author)

## 2018-01-15 NOTE — PSYCH
Progress Note  Psychotherapy Provided [facility]: Group Therapy provided today  Goals addressed in session:   1 D: PT  was seen for Group Therapy session  Pts shared details of their recent struggles with ED and other addictive behaviors with the group  Neuroplasticity and mindful eating skill sets were then discussed  A: Elizabeth was extremely open with the group in sharing her childhood trauma memories of her birthday  She accepted feedback and support, as well  P: Pt will continue to attend individual therapy weekly and the Adult ED group on a biweekly basis  Pain Scale and Suicide Risk [facility]: Current Pain Assessment: moderate to severe   On a scale of 0 to 10, the patient rates current pain at 6   Behavioral Health Treatment Plan [address] [place] [address] [address]: Diagnosis and Treatment Plan explained to patient, patient relates understanding diagnosis and is agreeable to Treatment Plan            Signatures   Electronically signed by : Fiorella Cardoza LCSW; Aug 17 2016  9:29AM EST                       (Author)

## 2018-01-15 NOTE — PSYCH
Progress Note  Psychotherapy Provided [facility]: Group Therapy provided today  Goals addressed in session:   1 D: PT  was seen for Group Therapy session  Pts engaged in an activity / discussion on the topic of what it is like to have an Eating Disorder      A: Elizabeth was able to identifying some of the feelings she relates to, but rarely shares her food struggles in group  She is always supportive of others and more easily speaks about non-food related struggles  P: Pt will continue to attend individual therapy weekly and the Adult ED group on a biweekly basis  Behavioral Health Treatment Plan [facility]: Diagnosis and Treatment Plan explained to patient, patient relates understanding diagnosis and is agreeable to Treatment Plan  Assessment    1  Binge-eating disorder, severe (743 6) (C81 10)    Plan   1   Behavioral Health Service Flowsheet; Status:Complete - Retrospective Authorization;     Done: [id number] 08:36AM    Signatures   Electronically signed by : Hima Tan LCSW; Feb  3 2017  8:22AM EST                       (Author)

## 2018-01-15 NOTE — PSYCH
Progress Note  Psychotherapy Provided [facility]: Group Therapy provided today  Goals addressed in session:   D: Pt  was seen for Group Therapy session  Pts engaged in a discussion re: recovery and how an increase of ED symptom can impact the recovery process  A: Elizabeth shared that her mother had given her money to deposit in the bank and instead she spent it on lottery tickets, afterwards she binged as a result of feelings of guilt  She discussed the history of her ED and her codependent relationship with her mother  She was receptive to feedback from peers and could relate to other's experiences  P: Viktoria Castellanos will continue to attend individual therapy once a week and the Adult ED group on a biweekly basis  Pain Scale and Suicide Risk [facility]: Current Pain Assessment: no pain   On a scale of 0 to 10, the patient rates current pain at 0   Current suicide risk is low   Behavioral Health Treatment Plan [address] [place] [address] [address]: Diagnosis and Treatment Plan explained to patient, patient relates understanding diagnosis and is agreeable to Treatment Plan  Assessment    1   Eating disorder, unspecified (307 50) (F50 9)    Signatures   Electronically signed by : Lizzy Donis, ; Feb 18 2016  3:43PM EST                       (Author)    Electronically signed by : Neel Knox LCSW; Feb 22 2016  9:44AM EST                       (Author)

## 2018-01-16 ENCOUNTER — GENERIC CONVERSION - ENCOUNTER (OUTPATIENT)
Dept: OTHER | Facility: OTHER | Age: 33
End: 2018-01-16

## 2018-01-16 ENCOUNTER — ALLSCRIPTS OFFICE VISIT (OUTPATIENT)
Dept: OTHER | Facility: OTHER | Age: 33
End: 2018-01-16

## 2018-01-16 NOTE — PSYCH
Progress Note  Psychotherapy Provided  [facility]: Individual Psychotherapy 50 minutes provided today  Goals addressed in session:   1-3 D: Met with pt for Individual Therapy session  Elizabeth spoke more today about her feelings re: her body shame, urges to self injure, the lack of connection she feels with her Psychiatrist when she attempted to elaborate to her on her ED struggles during a recent appt, and the tools she used over the weekend to remain out of a crisis residence  A: Elizabeth struggles with making eye contact in therapy but appears to process much of the information discussed after / in between sessions She is taking therapy very seriously and working diligently on her issues at this time  P: Upcoming sessions will be used to continue to address the above  She will continue to attend both weekly Trauma and ED group therapy sessions  Pain Scale and Suicide Risk  [facility]: Current Pain Assessment: moderate to severe   On a scale of 0 to 10, the patient rates current pain at 5   Behavioral Health Treatment Plan [address] [place] [address] [address]: Diagnosis and Treatment Plan explained to patient, patient relates understanding diagnosis and is agreeable to Treatment Plan  Assessment    1  EILEEN (generalized anxiety disorder) (300 02) (F41 1)   2  Bipolar I disorder, most recent episode mixed, moderate (296 62) (F31 62)   3  Binge-eating disorder, severe (783 6) (F50 81)   4   Gambling disorder, moderate (312 31) (F63 0)    Signatures   Electronically signed by : Fiorella Cardoza LCSW; Mar  9 2017 12:34PM EST                       (Author)

## 2018-01-16 NOTE — PSYCH
Date of Initial Treatment Plan: 7/31/2008  Date of Current Treatment Plan: 10/12/17  Treatment Plan 19  Strengths/Personal Resources for Self Care: Determined, intelligent, and gets along well with others  Diagnosis:   Axis I: ED, NOS, OCD, Bipolar D/O     Area of Needs: My parents drives me crazy  I struggle with feeling like I always need to complete my list    I binge  Long Term Goals:   I want to be healthy  Target Date: 2/12/18      I want to better manage my OCD and anxiety  Target Date: 2/12/18      I want to increase awareness and control over my night eating   Target Date: 2/12/18    Short Term Objectives:   Goal 1:   I will continue to assert myself with my parents and set boundaries with them  I will talk in therapy about my past and how it affects my present  Target Date: 1/12/18      Goal 2:   I will delay and put off things I think I need to do "right now "   I will continue to learn about trauma by attending group therapy  I will learn more about self compassion  Target Date: 1/12/18      Goal 3:   I will decrease my nighttime eating--when I awaken, I will return to bed without (immediately) eating  I will challenge my   ED thoughts re: starving self all day and binging repeatedly at night  I will continue to attend ED group  Target Date: 1/12/18      GOAL 1: Modality: Individual 1 x per month Target Date: 2/12/18   GOAL 1: Modality: Group 4 x per month Target Date: 2/12/18      The person(s) responsible for carrying out the plan is Yaya Tran  GOAL 2: Modality: Individual 2 x per month Target Date: 2/12/18   GOAL 2: Modality: Group 4 x per month Target Date: 2/12/18      The person(s) responsible for carrying out the plan is Yaya Tran  GOAL 3: Modality: Individual 2 x per month Target Date: 2/12/18   GOAL 3: Modality: Group 4 x per month Target Date: 2/12/18        The person(s) responsible for carrying out the plan is Yaya Tran       The first scheduled review date is 2/12/18  The expected length of service is 6 mos  CLIENT COMMENTS / Please share your thoughts, feelings, need and/or experiences regarding your treatment plan: _____________________________________________________________________________________________________________________________________________________________________________________________________________________________________________________________________________________________________________________ Date/Time: ______________     Patient Signature: _________________________________ Date/Time: ______________        1  Abnormal weight gain (783 1) (R63 5)   2  Allergic rhinitis (477 9) (J30 9)   3  Amenorrhea (626 0) (N91 2)   4  Binge-eating disorder, severe (783 6) (F50 81)   5  Bulging lumbar disc (722 10) (M51 26)   6  Dysuria (788 1) (R30 0)   7  Esophageal reflux (530 81) (K21 9)   8  Foot pain (729 5) (M79 673)   9  EILEEN (generalized anxiety disorder) (300 02) (F41 1)   10  Gambling disorder, moderate (312 31) (F63 0)   11  Hypertension (401 9) (I10)   12  Hypothyroidism (244 9) (E03 9)   13  Long term current use of therapeutic drug (V58 69) (Z79 899)   14  Lumbago (724 2) (M54 5)   15  Lumbar degenerative disc disease (722 52) (M51 36)   16  Morbid or severe obesity due to excess calories (278 01) (E66 01)   17  Nocturnal enuresis (788 36) (N39 44)   18  Obsessive-compulsive disorder (300 3) (F42 9)   19  Other abnormal glucose (790 29) (R73 09)   20  Overactive bladder (596 51) (N32 81)   21  RLS (restless legs syndrome) (333 94) (G25 81)   22  Severe bipolar I disorder, most recent episode depressed (296 53) (F31 4)   23  Urgency incontinence (788 31) (N39 41)   24  Urinary incontinence, unspecified type (788 30) (R32)   25  Urinary tract infection, site unspecified (599 0) (N39 0)   26   Vitamin D deficiency (268 9) (E55 9)     Electronically signed by : Tawanna Padilla LCSW; Oct 12 2017 10:12AM EST (Author)

## 2018-01-16 NOTE — PSYCH
Treatment Plan Tracking    #1 Treatment Plan not completed within required time limits due to: Client presented with emotional/behavioral issues that required clinical intervention            Signatures   Electronically signed by : Stephanie Romero LCSW; Oct  6 2017 11:48AM EST                       (Author)

## 2018-01-16 NOTE — PSYCH
Progress Note  Psychotherapy Provided [facility]: Individual Psychotherapy 50 minutes and Telephone Contact provided today  Goals addressed in session:   1: Pt was seen today for Individual Therapy session  Pt spoke about her feelings re: her RLS and the medication she was prescribed to treat it  Her desire to work towards independence was discussed and the importance of stopping all gambling was agreed upon  A: Frantz continues to develop insight and courage to work toward becoming more independent in her recovery as she sees herself separately from her mother  P:Elizabeth will continue to attend Individual and Group therapy on a weekly basis  Pain Scale and Suicide Risk [facility]: On a scale of 0 to 10, the patient rates current pain at 3   Current suicide risk is low   Behavioral Health Treatment Plan [facility]: Diagnosis and Treatment Plan explained to patient, patient relates understanding diagnosis and is agreeable to Treatment Plan  Assessment    1  Bipolar I disorder, most recent episode depressed, in partial remission (296 55)   (F31 75)   2  Eating disorder, unspecified (307 50) (F50 9)   3  EILEEN (generalized anxiety disorder) (300 02) (F41 1)   4   Amenorrhea (626 0) (N91 2)    Signatures   Electronically signed by : Neel Knox LCSW; Jul 26 2016 12:55PM EST                       (Author)

## 2018-01-16 NOTE — PSYCH
Progress Note  Psychotherapy Provided [facility]: Individual Psychotherapy 50 minutes provided today  Goals addressed in session:   1-3 D: Met with pt for Individual Therapy session  Elizabeth spoke today about her feelings re: her attempts to maintain / set boundaries with peers while learning to take better care of herself  She also shared re: relationship with step-father and father on similar issues  A: [facility] has continued to realize how mentally unwell her mother is and how this has contributed / prevented her mental health  [facility] is looking forward to starting to work with a new dr so that she can have her medication adjusted  P: Upcoming sessions will be used to continue to address the above  She will continue to attend both weekly Trauma and ED group therapy sessions  Pain Scale and Suicide Risk [facility]: Current Pain Assessment: moderate to severe   On a scale of 0 to 10, the patient rates current pain at 5   Behavioral Health Treatment Plan [facility]: Diagnosis and Treatment Plan explained to patient, patient relates understanding diagnosis and is agreeable to Treatment Plan  Assessment    1  Binge-eating disorder, severe (783 6) (F50 81)   2  Bipolar I disorder, most recent episode mixed, moderate (296 62) (F31 62)   3  EILEEN (generalized anxiety disorder) (300 02) (F41 1)   4  Gambling disorder, moderate (312 31) (F63 0)   5   Obsessive-compulsive disorder (300 3) (F42 9)    Signatures   Electronically signed by : Tawanna Padilla LCSW; Mar 23 2017  2:11PM EST                       (Author)

## 2018-01-16 NOTE — PSYCH
Date of Initial Treatment Plan: 7/31/2008  Date of Current Treatment Plan: 6/1/17  Treatment Plan 18  Strengths/Personal Resources for Self Care: Determined, intelligent, and gets along well with others  Diagnosis:   Axis I: ED, NOS, OCD, Bipolar D/O     Area of Needs: My parents drives me crazy  I struggle with feeling like I always need to complete my list    I binge  Long Term Goals:   I want to be healthy  Target Date: 10/1/17      I want to better manage my OCD and anxiety  Target Date: 10/1/17      I want to increase awareness and control over my binging  Target Date: 10/1/17    Short Term Objectives:   Goal 1:   I will continue to assert myself with my parents and set boundaries with them  Target Date: 9/1/17      Goal 2:   I will continue to go for walks with Max  I will continue to learn about trauma by attending group therapy  I will continue to challenge myself re: whether or not I need to complete my list    Target Date: 9/1/17      Goal 3:   I will decrease my nighttime eating--when I awaken, I will return to bed without (immediately) eating  If I am thinking about a binge, I will delay acting on it  I will continue to attend ED group  Target Date: 9/1/17      GOAL 1: Modality: Individual 1 x per month Target Date: 10/1/17   GOAL 1: Modality: Group 4 x per month Target Date: 10/1/17      The person(s) responsible for carrying out the plan is Ashia Mcwilliams  GOAL 2: Modality: Individual 2 x per month Target Date: 10/1/17   GOAL 2: Modality: Group 4 x per month Target Date: 10/1/17      The person(s) responsible for carrying out the plan is Elizabeth  GOAL 3: Modality: Individual 2 x per month Target Date: 10/1/17   GOAL 3: Modality: Group 4 x per month Target Date: 10/1/17        The person(s) responsible for carrying out the plan is Elizabeth  The first scheduled review date is 10/1/17  The expected length of service is 6 mos           CLIENT COMMENTS / Please share your thoughts, feelings, need and/or experiences regarding your treatment plan: _____________________________________________________________________________________________________________________________________________________________________________________________________________________________________________________________________________________________________________________ Date/Time: ______________     Patient Signature: _________________________________ Date/Time: ______________        1  Abnormal weight gain (783 1) (R63 5)   2  Acute bronchitis (466 0) (J20 9)   3  Acute diverticulitis (562 11) (K57 92)   4  Acute frontal sinusitis (461 1) (J01 10)   5  Acute pain (338 19) (R52)   6  Allergic rhinitis (477 9) (J30 9)   7  Amenorrhea (626 0) (N91 2)   8  Binge-eating disorder, severe (783 6) (F50 81)   9  Bipolar 1 disorder, depressed, moderate (296 52) (F31 32)   10  Bronchitis (490) (J40)   11  Bulging lumbar disc (722 10) (M51 26)   12  Cough (786 2) (R05)   13  Dysuria (788 1) (R30 0)   14  Esophageal reflux (530 81) (K21 9)   15  Foot pain (729 5) (M79 673)   16  EILEEN (generalized anxiety disorder) (300 02) (F41 1)   17  Gambling disorder, moderate (312 31) (F63 0)   18  Hypertension (401 9) (I10)   19  Hypothyroidism (244 9) (E03 9)   20  Long term current use of therapeutic drug (V58 69) (Z79 899)   21  Lumbago (724 2) (M54 5)   22  Lumbar degenerative disc disease (722 52) (M51 36)   23  Morbid or severe obesity due to excess calories (278 01) (E66 01)   24  Nocturnal enuresis (788 36) (N39 44)   25  Obsessive-compulsive disorder (300 3) (F42 9)   26  Overactive bladder (596 51) (N32 81)   27  Prediabetes (790 29) (R73 09)   28  RLS (restless legs syndrome) (333 94) (G25 81)   29  Urinary incontinence, unspecified type (788 30) (R32)   30   Vitamin D deficiency (268 9) (E55 9)     Electronically signed by : Lila Prince LCSW; Jun 1 2017  1:41PM EST                       (Author)

## 2018-01-16 NOTE — PSYCH
Progress Note  Psychotherapy Provided [facility]: Group Therapy provided today  Goals addressed in session:   1 D: PT  was seen for Group Therapy session  Pts engaged in discussion on the topic of emotion driven behaviors and primary versus secondary emotional reactions  A: Elizabeth was able to give examples of how different her own primary reaction often is from her secondary  She is open in sharing during group and is always supportive of others  P: Pt will continue to attend individual therapy weekly and the Adult ED group on a biweekly basis  Behavioral Health Treatment Plan [facility]: Diagnosis and Treatment Plan explained to patient, patient relates understanding diagnosis and is agreeable to Treatment Plan  Assessment    1   Binge-eating disorder, severe (538 6) (D51 27)    Signatures   Electronically signed by : Tawanna Padilla LCSW; Jan 5 2017 12:54PM EST                       (Author)

## 2018-01-16 NOTE — PSYCH
Progress Note  Psychotherapy Provided [facility]: Group Therapy provided today  Goals addressed in session:   1 D: PT  was seen for Group Therapy session  Pts engaged in activity, discussion re: ways that they continue to rely on / live by ED's rules  They were then challenged to think about how strongly they are working toward their own recovery  A: Elizabeth struggled to come up with a list of rules  She admitted that she does not support herself and is unkind to herself  She accepted feedback and support, as well  P: Pt will continue to attend individual therapy weekly and the Adult ED group on a biweekly basis  Behavioral Health Treatment Plan [facility]: Diagnosis and Treatment Plan explained to patient, patient relates understanding diagnosis and is agreeable to Treatment Plan  Assessment    1   Binge-eating disorder, severe (773 6) (J33 98)    Signatures   Electronically signed by : Lila Prince LCSW; Oct 28 2016 10:01AM EST                       (Author)

## 2018-01-16 NOTE — PSYCH
Progress Note  Psychotherapy Provided [facility]: Group Therapy provided today  Pain Scale and Suicide Risk [facility]: On a scale of 0 to 10, the patient rates current pain at 6   Behavioral Health Treatment Plan [address] [place] [address] [address]: Diagnosis and Treatment Plan explained to patient, patient relates understanding diagnosis and is agreeable to Treatment Plan  Assessment    1   EILEEN (generalized anxiety disorder) (300 02) (F41 1)    Signatures   Electronically signed by : Harper Norton LCSW; Jun 14 2017 10:42AM EST                       (Author)
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
## 2018-01-16 NOTE — PSYCH
Progress Note  Psychotherapy Provided [facility]: Individual Psychotherapy 50 minutes provided today  Goals addressed in session:   1-3 D: Met with pt for Individual Therapy session  Elizabeth spoke today about her feelings re: her relationship with her mother as well as her OCD struggles  A: Emily Geiger is working closely with Urology to decrease the number of times she awakens to urinate (8) as she sleeps less than 4 hours per night which is extremely unhealthy and results in night binging  P: Upcoming sessions will be used to continue to address the above  She will continue to attend both weekly Trauma and ED group therapy sessions  Pain Scale and Suicide Risk [facility]: Current Pain Assessment: moderate to severe   On a scale of 0 to 10, the patient rates current pain at 3   Current suicide risk is low   Behavioral Health Treatment Plan [address] [place] [address] [address]: Diagnosis and Treatment Plan explained to patient, patient relates understanding diagnosis and is agreeable to Treatment Plan  Assessment    1  Binge-eating disorder, severe (783 6) (F50 81)   2  Bipolar I disorder, most recent episode mixed, moderate (296 62) (F31 62)   3  EILEEN (generalized anxiety disorder) (300 02) (F41 1)   4   Gambling disorder, moderate (312 31) (F63 0)    Signatures   Electronically signed by : Calli Garcia LCSW; May 12 2017 10:15AM EST                       (Author)

## 2018-01-16 NOTE — PSYCH
Progress Note  Psychotherapy Provided [facility]: Group Therapy provided today  Goals addressed in session:   1 D: PT  was seen for Group Therapy session  Pts engaged in discussion on the topic of self compassion  Myths and fallacies were debunked as a part of this discussion  A: Elizabeth shared examples from her childhood and is always supportive of other group members  P: Pt will continue to attend individual therapy weekly and the Adult ED group on a biweekly basis  Pain Scale and Suicide Risk [facility]: Current Pain Assessment: moderate to severe   On a scale of 0 to 10, the patient rates current pain at 6   Behavioral Health Treatment Plan [address] [place] [address] [address]: Diagnosis and Treatment Plan explained to patient, patient relates understanding diagnosis and is agreeable to Treatment Plan  Assessment    1   Binge-eating disorder, severe (303 6) (C48 52)    Signatures   Electronically signed by : Ceci Shrestha LCSW; Jan 20 2017  9:21AM EST                       (Author)

## 2018-01-16 NOTE — PSYCH
Progress Note  Psychotherapy Provided [facility]: Individual Psychotherapy 50 minutes provided today  Goals addressed in session:   1-3 D: Met with pt for Individual Therapy session  Elizabeth spoke today about her sexual encounters with male and female peers that have resulted in her lack of interest in sex at present  This was processed for the duration of today's session  A: Elizabeth was once again extremely open and insightful as she processed the above  She was able to acknowledge a fear in confronting her father as he provides financial support to her  P: Upcoming sessions will be used to continue to address the above  She will continue to attend both weekly Trauma and ED group therapy sessions  Pain Scale and Suicide Risk [facility]: Current Pain Assessment: moderate to severe   On a scale of 0 to 10, the patient rates current pain at 4   Behavioral Health Treatment Plan [address] [place] [address] [address]: Diagnosis and Treatment Plan explained to patient, patient relates understanding diagnosis and is agreeable to Treatment Plan  Assessment    1  Binge-eating disorder, severe (783 6) (F50 81)   2  Bipolar I disorder, most recent episode mixed, moderate (296 62) (F31 62)   3  Obsessive-compulsive disorder (300 3) (F42 9)   4   EILEEN (generalized anxiety disorder) (300 02) (F41 1)    Signatures   Electronically signed by : Mindy Perez LCSW; Oct 24 2016 12:05PM EST                       (Author)

## 2018-01-16 NOTE — PSYCH
Progress Note  Psychotherapy Provided [facility]: Family Therapy provided today  Goals addressed in session:   1: Pt was seen with her father today for a Family Therapy session  Pt 's father reported that she has always "made a mountain out of a molehill" and thought less of herself than she is capable of    A: Elizabeth remains unaware of how much more stable she functions today than 7 years ago when she struggled with suicidality even though she was attending college courses  She is able to recognize, however, that her role at home has changed to being more of a caregiver to her mother as she was when she was younger  P:Elizabeth will continue to attend Individual and Group therapy on a weekly basis and encouraged to make short term goals / plans  Pain Scale and Suicide Risk [facility]: Current Pain Assessment: moderate to severe   On a scale of 0 to 10, the patient rates current pain at 3   Behavioral Health Treatment Plan [facility]: Diagnosis and Treatment Plan explained to patient, patient relates understanding diagnosis and is agreeable to Treatment Plan  Assessment    1  Bipolar I disorder, most recent episode depressed, moderate (296 52) (F31 32)   2  Eating disorder, unspecified (307 50) (F50 9)   3   Gambling disorder, moderate (312 31) (F63 0)    Signatures   Electronically signed by : Alessandro Richard LCSW; Mar  4 2016  4:19PM EST                       (Author)

## 2018-01-16 NOTE — PSYCH
Progress Note  Psychotherapy Provided [facility]: Group Therapy provided today  Goals addressed in session:   D: Pt  was seen for Group Therapy session  Pts engaged in a discussion re: Secondary gains associated with illness  A: Elizabeth discussed a new dietician she found that has a history of ED and is going to see a presentation the dietician is giving about ED  Elizabeth expressed an interest in making an appointment to be treated by the dietician  She was receptive to feedback from peers and could relate to other's experiences  P: Freddie Alexander will continue to attend individual therapy once a week and the Adult ED group on a biweekly basis  Pain Scale and Suicide Risk [facility]: Current Pain Assessment: no pain   On a scale of 0 to 10, the patient rates current pain at 0   Current suicide risk is low   Behavioral Health Treatment Plan [address] [place] [address] [address]: Diagnosis and Treatment Plan explained to patient, patient relates understanding diagnosis and is agreeable to Treatment Plan  Assessment    1   Eating disorder, unspecified (307 50) (Q68 9)    Signatures   Electronically signed by : [facility], [facility]; Mar  4 2016  8:18AM EST                       (Author)

## 2018-01-16 NOTE — MISCELLANEOUS
Message   Recorded as Task   Date: 07/07/2017 02:00 PM, Created By: Sofi Barton   Task Name: Care Coordination   Assigned To: Chaparro Wise   Regarding Patient: Lida Najjar, Status: Active   Comment:    Caludine Honeycutt - 07 Jul 2017 2:00 PM     TASK CREATED  As per earlier discussion, I contacted PCP re: Enrrique Richter- spoke with RN  Wt loss meds not covered by Medicare, so pt applied for the financial assistance program--she was approved  She then called PCP back who now told her she wants to hear from [address] directly that you are ok with her prescribing this medication  Pt is excited to start this medication  Ms  Edwin left msg that Janice Emery PMD would like to speak with me regarding any possible concerns with prescribing Belviq for Wt loss  I attempted to contact Pt's primary care office of record at [phone] and left a msg for her PMD to call back        Signatures   Electronically signed by : SCOTTIE Tavarez; Jul 7 2017  6:34PM EST                       (Author)

## 2018-01-16 NOTE — PSYCH
Progress Note  Psychotherapy Provided [facility]: Individual Psychotherapy 50 minutes provided today  Goals addressed in session:   1-3 D: Met with Elizabeth for Individual Therapy session  Elizabeth spoke further today about her feelings re: her move in with a peer and her need to develop a budget to be able to afford her bills in order to live independently from her parents  A: [employer] [facility] [employer] wants to work towards being able to hold a job  She is interested in volunteering prior to this to build up her confidence and decrease her anxiety  P: Upcoming sessions will be used to continue to address the above  She will continue to attend both weekly Trauma and ED group therapy sessions  Pain Scale and Suicide Risk [facility]: Current Pain Assessment: moderate to severe   On a scale of 0 to 10, the patient rates current pain at 7   Current suicide risk is low   Behavioral Health Treatment Plan [employer]: Diagnosis and Treatment Plan explained to patient, patient relates understanding diagnosis and is agreeable to Treatment Plan  Assessment    1  Binge-eating disorder, severe (783 6) (F50 81)   2  EILEEN (generalized anxiety disorder) (300 02) (F41 1)   3  Gambling disorder, moderate (312 31) (F63 0)   4  PTSD (post-traumatic stress disorder) (309 81) (F43 10)   5  Obsessive-compulsive disorder (300 3) (F42 9)   6   Severe bipolar I disorder, most recent episode depressed (296 53) (F31 4)    Signatures   Electronically signed by : Jeet Lay LCSW; Nov 16 2017  2:40PM EST                       (Author)
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
## 2018-01-16 NOTE — PSYCH
Progress Note  Psychotherapy Provided [facility]: Group Therapy provided today  Goals addressed in session:   1 D: Pt attended Trauma Group  Pts engaged in an activity and discussion on the topic of setting goals for the upcoming year  A: Elizabeth spoke with group members about her desire to continue becoming more active outside of her home and to be more confident in herself  She has becoming more outspoken and open in sharing as she has gotten comfortable with group members  P: Pt will continue to be seen for individual and group therapy during which the above will be further processed  Behavioral Health Treatment Plan [facility]: Diagnosis and Treatment Plan explained to patient, patient relates understanding diagnosis and is agreeable to Treatment Plan  Assessment    1   EILEEN (generalized anxiety disorder) (300 02) (F41 1)    Signatures   Electronically signed by : Milvia Garber LCSW; Dec 15 2016  2:39PM EST                       (Author)

## 2018-01-16 NOTE — PSYCH
Psych Med Mgmt    Appearance: adequate hygiene and grooming, demonstrated behavior psychomotor retardation and good eye contact  Observed mood: depressed and anxious  Observed mood: affect was constricted  Speech: a normal rate  Thought processes: coherent/organized  Hallucinations: no hallucinations present  Thought Content: no delusions  Abnormal Thoughts: The patient has no suicidal thoughts and no homicidal thoughts  Orientation: The patient is oriented to person, place and time  Recent and Remote Memory: short term memory intact and long term memory intact  Attention Span And Concentration: concentration impaired  Insight: Insight intact  Judgment: Her judgment was intact  Muscle Strength And Tone  Muscle strength and tone were normal  Normal gait and station  Language: no difficulty naming common objects  Fund of knowledge: Patient displays adequate knowledge of current events, adequate fund of knowledge regarding past history and adequate fund of knowledge regarding vocabulary  The patient is experiencing no localized pain  Treatment Recommendations: Continue Lithium 900 mg at bedtime  Continue Ativan 0 5 mg tid PRN  Increase Latuda to 80 mg in the evening  Continue Lamictal 400 mg at bedtime  Continue Prozac 20 mg daily  Continue therapy with [facility]; also attends eating disorders group  Follows with PCP for glucose and lipid monitoring  Risks, Benefits And Possible Side Effects Of Medications: Risks, benefits, and possible side effects of medications explained to patient and patient verbalizes understanding, Risks of medications explained if female patient  Patient verbalizes understanding and agrees to notify her doctor if she becomes pregnant  She reports increased appetite, normal energy level, recent 4 lbs weight gain  and decrease in number of sleep hours 7  Seen with mother   Giovanni Scottk states she has been feeling more depressed again last 3 days, thinks it was related to restless legs and new medication she was started on over a week ago (Requip)  Feels a little better today, less depressed, but has some irritability  Has on and off anxiety "It is never great"  No suicidal or homicidal ideation  No psychotic symptoms  Sleep fluctuates  Appetite is increased  No side effects from medications reported  No rash except possible insect bite on left hand  Vitals  Signs   Recorded: 22SBX0464 80:15UD   Systolic: 96  Diastolic: 61  Heart Rate: 67  Height: 5 ft 8 in  Weight: 372 lb   BMI Calculated: 56 56  BSA Calculated: 2 66  BP Cuff Size: Extra-Large    Assessment    1  Eating disorder, unspecified (307 50) (F50 9)   2  EILEEN (generalized anxiety disorder) (300 02) (F41 1)   3  Obsessive-compulsive disorder (300 3) (F42)   4  Bipolar I disorder, most recent episode depressed, mild (296 51) (F31 31)    Plan    1  From  Latuda 60 MG Oral Tablet TAKE 1 TABLET IN THE EVENING WITH   DINNER To Latuda 80 MG Oral Tablet TAKE 1 TABLET IN THE EVENING WITH DINNER   2  Follow-up Visit in 4 Weeks Evaluation and Treatment  Follow-up  Status: Hold For -   Scheduling  Requested for: 66KOW5072    3  (1) BASIC METABOLIC PROFILE; Status:Active; Requested for:51Clg2115;    4  (1) LITHIUM; Status:Active; Requested for:24Vbi0264;     Review of Systems    Constitutional: recent 4 lb weight gain and feeling tired  Cardiovascular: no complaints of slow or fast heart rate, no chest pain, no palpitations  Respiratory: no complaints of shortness of breath, no wheezing, no dyspnea on exertion  Gastrointestinal: no complaints of abdominal pain, no constipation, no nausea, no diarrhea, no vomiting  Genitourinary: no complaints of dysuria, no incontinence, no pelvic pain, no urinary frequency  Musculoskeletal: no complaints of arthralgia, no myalgias, no limb pain, no joint stiffness  Integumentary: no complaints of skin rash, no itching, no dry skin     Neurological: no complaints of headache, no confusion, no numbness, no dizziness  Past Psychiatric History    Past Psychiatric History: Multiple past inpatient psychiatric admissions  One past suicide attempt by overdose in 2009  Substance Abuse Hx    Substance Abuse History: No drug or alcohol use  Active Problems    1  Abnormal weight gain (783 1) (R63 5)   2  Acute diverticulitis (562 11) (K57 92)   3  Acute pain (338 19) (R52)   4  Allergic rhinitis (477 9) (J30 9)   5  Amenorrhea (626 0) (N91 2)   6  Bulging lumbar disc (722 10) (M51 26)   7  Dizziness (780 4) (R42)   8  Dysfunctional uterine bleeding (626 8) (N93 8)   9  Dysuria (788 1) (R30 0)   10  Eating disorder, unspecified (307 50) (F50 9)   11  Encounter for routine gynecological examination (V72 31) (Z01 419)   12  Esophageal reflux (530 81) (K21 9)   13  Foot pain (729 5) (M79 673)   14  EILEEN (generalized anxiety disorder) (300 02) (F41 1)   15  Gambling disorder, moderate (312 31) (F63 0)   16  Hypertension (401 9) (I10)   17  Hypothyroidism (244 9) (E03 9)   18  Lumbago (724 2) (M54 5)   19  Lumbar degenerative disc disease (722 52) (M51 36)   20  Menorrhagia (626 2) (N92 0)   21  Morbid or severe obesity due to excess calories (278 01) (E66 01)   22  Obsessive-compulsive disorder (300 3) (F42)   23  Obstructive sleep apnea (327 23) (G47 33)   24  Otalgia, unspecified laterality (388 70) (H92 09)   25  Prediabetes (790 29) (R73 09)   26  RLS (restless legs syndrome) (333 94) (G25 81)   27  Urinary tract infection (599 0) (N39 0)   28  URTI (acute upper respiratory infection) (465 9) (J06 9)   29  Viral bronchitis (466 0) (J20 8)   30  Vitamin D deficiency (268 9) (E55 9)    Past Medical History    1  History of Abscess of face (682 0) (L02 01)   2  History of Acute nonsuppurative otitis media, unspecified laterality (381 00) (H65 199)   3  History of Backache (724 5) (M54 9)   4  History of Candidiasis, cutaneous (112 3) (B37 2)   5   History of Dog bite (879 8,E906 0) (W54  0XXA)   6  History of Dysfunction of left eustachian tube (381 81) (H69 82)   7  History of acute sinusitis (V12 69) (Z87 09)   8  Denied: History of head injury   9  History of Pseudotumor cerebri (348 2) (G93 2)   10  Denied: History of Seizures   11  History of Suicide and self-inflicted injury (F152 7) (S03 4WDE)    The active problems and past medical history were reviewed and updated today  Allergies    1  No Known Drug Allergies    Current Meds   1  Accu-Chek Martha Device; test 4 times daily; Therapy: 64MDS4011 to (Last Rx:31Kvh8697)  Requested for: 54HZB5369 Ordered   2  Accu-Chek Martha In Vitro Strip; TEST 4 TIMES DAILY; Therapy: 31WMN2925 to (Evaluate:96Snk3217)  Requested for: 96ODO3827; Last   Rx:13Gis3508 Ordered   3  Accu-Chek Soft Touch Lancets Miscellaneous; test blood sugar four times daily; Therapy: 15BHM2554 to (Evaluate:13Apr2016)  Requested for: 10LPI2140; Last   Rx:25Rqr6558 Ordered   4  Amoxicillin-Pot Clavulanate 875-125 MG Oral Tablet; TAKE 1 TABLET EVERY 12 HOURS   DAILY; Therapy: 11GHP7379 to (Keira Solis)  Requested for: 26Apr2016; Last   Rx:02Mtv8895 Ordered   5  AZO Cranberry 250-30 MG Oral Tablet; Take 1 tablet 4 times daily; Therapy: 69HNE3004 to (Keira Beech)  Requested for: 73Scp5120; Last   Rx:12Zkd3108 Ordered   6  FLUoxetine HCl - 20 MG Oral Capsule; TAKE 1 CAPSULE DAILY; Therapy: 83AWA9961 to ([phone])  Requested for: 60YJL6714; Last   Rx:08Jun2016 Ordered   7  Hydrocodone-Acetaminophen 5-325 MG Oral Tablet; TAKE 1 TABLET 3 times daily PRN   as needed for pain; Therapy: 86EKM3057 to (Evaluate:28Nov2014); Last Rx:31Oct2014 Ordered   8  LamoTRIgine 200 MG Oral Tablet; TAKE 2 TABLETS AT BEDTIME; Therapy: 42RKA9094 to ([phone])  Requested for: 49TCR4350; Last   Rx:08Jun2016 Ordered   9  Lansoprazole 15 MG Oral Capsule Delayed Release; take 1 capsule daily; Last   Rx:55Zmw3928 Ordered   10   Lisa Carter 60 MG Oral Tablet; TAKE 1 TABLET IN THE EVENING WITH DINNER; Therapy: 21Apr2016 to (Sheryle Chambers)  Requested for: 42KBU7165; Last    Rx:08Jun2016 Ordered   11  Levocetirizine Dihydrochloride 5 MG Oral Tablet; take 1 tablet by mouth every day; Therapy: 82Bpu9510 to (Evaluate:89Nuh8204)  Requested for: 34JBG8652; Last    Rx:92Nhf2222 Ordered   12  Levothyroxine Sodium 100 MCG Oral Tablet; take 1 tablet by mouth every day; Therapy: 31UVC5710 to (Mary Young)  Requested for: 41ZJN2443; Last    Rx:29Jan2016 Ordered   13  Lisinopril 10 MG Oral Tablet; TAKE 1 TABLET BY MOUTH EVERY DAY FOR BLOOD    PRESSURE; Therapy: 44PCL8457 to (Evaluate:21Oct2016)  Requested for: 03UWR4560; Last    ZD:21CPK0988 Ordered   14  Lithium Carbonate  MG Oral Tablet Extended Release; TAKE 2 TABLETS AT    BEDTIME; Therapy: 07JME0749 to (Sendy Raines)  Requested for: 91ADS8778; Last    Rx:08Jun2016 Ordered   15  LORazepam 0 5 MG Oral Tablet; Take 1 tablet 3 times daily  as needed; Therapy: 80Oeq5866 to (Evaluate:06Oct2016); Last Rx:08Jun2016 Ordered   16  MetFORMIN HCl  MG Oral Tablet Extended Release 24 Hour; TAKE 1 TABLET BY    MOUTH EVERY DAY WITH SUPPER; Therapy: 19AKR7466 to (Evaluate:57Jiq3514)  Requested for: 35UMX5177; Last    Rx:08Mar2016 Ordered   17  MetroNIDAZOLE 500 MG Oral Tablet; TAKE 1 TABLET EVERY 6 HOURS DAILY; Therapy: 53BVY1465 to Recorded   18  Nitrofurantoin Monohyd Macro 100 MG Oral Capsule; TAKE 1 CAPSULE EVERY 12    HOURS DAILY; Therapy: 74GUB1749 to (J Luis Dalton)  Requested for: 16ADG8462; Last    Rx:39Ahh5848 Ordered   19  PriLOSEC OTC 20 MG Oral Tablet Delayed Release; TAKE 1 TABLET TWICE DAILY; Therapy: 99Wzg1637 to (Evaluate:12Jun2016)  Requested for: 28Oca6188; Last    Rx:13Apr2016 Ordered   20  Proventil  (90 Base) MCG/ACT Inhalation Aerosol Solution; 2 PUFFS Q 4 HRS;     Therapy: 87RCU9307 to (Last CT:41LNG0148)  Requested for: 13LWE1916 Ordered   21  ROPINIRole HCl - 0 25 MG Oral Tablet; TAKE 1 TABLET Bedtime; Therapy: 64Zts7801 to (Last Rx:09Zot8956)  Requested for: 37Mye7805 Ordered    The medication list was reviewed and updated today  Family Psych History  Mother    1  Family history of bicuspid aortic valve (V17 49) (Z82 79)   2  Family history of depression (V17 0) (Z81 8)   3  Family history of Hypertension (V17 49)   4  Family history of Status post aortic valve replacement  Father    5  Family history of Hypertension (V17 49)   6  Family history of Hypothyroidism   7  No family history of mental disorder  Brother    8  Family history of Hypertension (V17 49)  Maternal Grandmother    9  Family history of Cancer   10  Family history of cerebrovascular accident (V17 1) (Z82 3)   11  Family history of Heart Disease (V17 49)  Paternal Grandmother    15  Family history of Cancer   13  Family history of malignant neoplasm (V16 9) (Z80 9)  Maternal Grandfather    15  Family history of pneumonia (V18 8) (Z83 1)  Paternal Grandfather    13  Family history of pneumonia (V18 8) (Z83 1)  Family History    12  Family history of Arthritis (716 90) (M19 90)   17  Family history of Breast Cancer (V16 3)   18  Family history of osteoporosis (V17 81) (Z82 62)   19  Family history of Hypertension (V17 49)   20  Family history of Transient Ischemic Attack    The family history was reviewed and updated today  Social History    · Caffeine Use   · Currently sexually active   · Disabled   · Former smoker (X97 80) (C30 156)   · High school graduate   · Lives with parents   · Never smoked   · No alcohol use   · Tea  The social history was reviewed and updated today  The social history was reviewed and is unchanged  Single, lives with mother and stepfather  No children  Some college  On disability  Worked in past in retail  No history of legal problems  No  history        History Of Phys/Sex Abuse Or Perpetration    History Of Phys/Sex Abuse or Perpetration: No history of physical or sexual abuse  End of Encounter Meds    1  MetroNIDAZOLE 500 MG Oral Tablet; TAKE 1 TABLET EVERY 6 HOURS DAILY; Therapy: 34PQU1937 to Recorded    2  Levocetirizine Dihydrochloride 5 MG Oral Tablet; take 1 tablet by mouth every day; Therapy: 37Ozp4855 to (Evaluate:04Mps4362)  Requested for: 64GYU7336; Last   Rx:68Cxi1321 Ordered    3  Latuda 80 MG Oral Tablet; TAKE 1 TABLET IN THE EVENING WITH DINNER; Therapy: 64Tvy6573 to (Evaluate:26Nov2016)  Requested for: 73Pje4463; Last   Rx:64Wpj5329 Ordered    4  LamoTRIgine 200 MG Oral Tablet; TAKE 2 TABLETS AT BEDTIME; Therapy: 43SUR6311 to (Krishna Lakhani)  Requested for: 44PMS3319; Last   Rx:08Jun2016 Ordered   5  Lithium Carbonate  MG Oral Tablet Extended Release; TAKE 2 TABLETS AT   BEDTIME; Therapy: 38YMK1284 to (Krishna Lakhani)  Requested for: 05JWL3829; Last   Rx:08Jun2016 Ordered    6  FLUoxetine HCl - 20 MG Oral Capsule; TAKE 1 CAPSULE DAILY; Therapy: 17DST0564 to (Krishna Lakhani)  Requested for: 80XQF5959; Last   Rx:08Jun2016 Ordered    7  Nitrofurantoin Monohyd Macro 100 MG Oral Capsule; TAKE 1 CAPSULE EVERY 12   HOURS DAILY; Therapy: 17IBQ0918 to (Sravanthi Murrell)  Requested for: 48ZTC9293; Last   Rx:67Eav6607 Ordered    8  Lansoprazole 15 MG Oral Capsule Delayed Release (Prevacid); take 1 capsule daily; Last Rx:09Yfy1805 Ordered    9  LORazepam 0 5 MG Oral Tablet; Take 1 tablet 3 times daily  as needed; Therapy: 52Qsw9361 to (Evaluate:06Oct2016); Last Rx:08Jun2016 Ordered   10  PriLOSEC OTC 20 MG Oral Tablet Delayed Release; TAKE 1 TABLET TWICE DAILY; Therapy: 83Wdf1694 to (Evaluate:12Jun2016)  Requested for: 26Jgn1207; Last    Rx:13Apr2016 Ordered    11  Lisinopril 10 MG Oral Tablet; TAKE 1 TABLET BY MOUTH EVERY DAY FOR BLOOD    PRESSURE; Therapy: 10KTG9533 to (Evaluate:21Oct2016)  Requested for: 58RSZ7569; Last    EY:59PTS9555 Ordered    12   Levothyroxine Sodium 100 MCG Oral Tablet (Synthroid); take 1 tablet by mouth every day; Therapy: 44PPB8358 to (Kaleigh Womack)  Requested for: 80BFB6675; Last    Rx:29Jan2016 Ordered    13  Hydrocodone-Acetaminophen 5-325 MG Oral Tablet; TAKE 1 TABLET 3 times daily PRN    as needed for pain; Therapy: 73QTZ9348 to (Evaluate:81Wlw4022); Last Rx:18Wre6375 Ordered    14  Accu-Chek Martha Device; test 4 times daily; Therapy: 70LLK5101 to (Last Rx:49Jon6575)  Requested for: 56DEX3081 Ordered   15  Accu-Chek Martha In Vitro Strip; TEST 4 TIMES DAILY; Therapy: 00MPG7862 to (Evaluate:77Ivs3053)  Requested for: 86RMR5950; Last    Rx:22Tvl1211 Ordered   16  Accu-Chek Soft Touch Lancets Miscellaneous; test blood sugar four times daily; Therapy: 54VOT8779 to (Evaluate:84Smp0036)  Requested for: 90WBC3768; Last    Rx:59Jow6452 Ordered   17  MetFORMIN HCl  MG Oral Tablet Extended Release 24 Hour; TAKE 1 TABLET BY    MOUTH EVERY DAY WITH SUPPER; Therapy: 37QSW4126 to (Evaluate:86Rov6695)  Requested for: 19STM3965; Last    Rx:08Mar2016 Ordered    18  ROPINIRole HCl - 0 25 MG Oral Tablet; TAKE 1 TABLET Bedtime; Therapy: 12Wke0004 to (Last Rx:18Tgc8286)  Requested for: 26Lue5024 Ordered    19  AZO Cranberry 250-30 MG Oral Tablet; Take 1 tablet 4 times daily; Therapy: 24CWJ6954 to (Jovanny Chew)  Requested for: 26Apr2016; Last    Rx:26Apr2016 Ordered    20  Amoxicillin-Pot Clavulanate 875-125 MG Oral Tablet; TAKE 1 TABLET EVERY 12 HOURS    DAILY; Therapy: 74QUX7575 to (Jovanny Chew)  Requested for: 26Apr2016; Last    Rx:26Apr2016 Ordered   21  Proventil  (90 Base) MCG/ACT Inhalation Aerosol Solution; 2 PUFFS Q 4 HRS; Therapy: 22PMQ3813 to (Last Rx:27Jan2016)  Requested for: 72KRF0732 Ordered    Future Appointments    Date/Time Provider Specialty Site   12/09/2016 08:10 AM Stacie Hammans, M D  Endocrinology [facility] ENDOCRINOLOGY   09/06/2016 04:00 PM PAUL Awad   Psychiatry [facility]   10/03/2016 04:00 PM PAUL Fine   Psychiatry [facility]   08/16/2016 09:15 AM Fredy Bg, [address]   08/02/2016 05:00 PM Robert y [address] [facility]   08/05/2016 02:00 PM Bárbara Pineda, [facility]   08/11/2016 02:00 PM Bárbara Pineda, [address] [facility]   08/16/2016 05:00 PM Bárbara Pineda, [facility]   08/18/2016 02:00 PM Bárbara Pineda, [address] [facility]   08/30/2016 05:00 PM Bárbara Pineda, [facility]   09/08/2016 01:00 PM [facility], [facility]   09/13/2016 05:00 PM Bárbara Pineda, [facility]   09/15/2016 01:00 PM Bárbara Pineda, [facility]   09/27/2016 05:00 PM Bárbara Pineda, [facility]   10/11/2016 05:00 PM Bárbara Pineda, [facility]   10/25/2016 05:00 PM Bárbara Honeycutt, [facility]     Signatures   Electronically signed by : PAUL Holman ; Jul 29 2016  3:32PM EST                       (Author)

## 2018-01-16 NOTE — PSYCH
Progress Note  Psychotherapy Provided [facility]: Individual Psychotherapy 50 minutes and Telephone Contact provided today  Goals addressed in session:   1: Pt was seen today for Individual Therapy session  Pt spoke about her feelings re: her mother and step-father's passive aggressive behaviors  Ways she responds and alternatives were discussed  A: Elizabeth admits to feeling guilty when she discusses family issues as she blames herself for all that is wrong at home  She is beginning to work to modify these beliefs   P:Elizabeth will continue to attend Individual and Group therapy on a weekly basis  Pain Scale and Suicide Risk [facility]: On a scale of 0 to 10, the patient rates current pain at 3   Current suicide risk is low   Behavioral Health Treatment Plan [facility]: Diagnosis and Treatment Plan explained to patient, patient relates understanding diagnosis and is agreeable to Treatment Plan  Assessment    1  Eating disorder, unspecified (307 50) (F50 9)   2  EILEEN (generalized anxiety disorder) (300 02) (F41 1)   3  Bipolar I disorder, most recent episode depressed, in partial remission (296 55)   (F31 75)   4   Gambling disorder, moderate (312 31) (F63 0)    Signatures   Electronically signed by : Collin Marmolejo LCSW; Jul 14 2016 11:45AM EST                       (Author)

## 2018-01-16 NOTE — PSYCH
Progress Note  Psychotherapy Provided [facility]: Group Therapy provided today  Goals addressed in session:   1 D: PT  was seen for Group Therapy session  Pts provided each other with updates on their recent struggles while also speaking about their feelings re: their ED histories  A: Elizabeth responded positively to gentle prompting and shared her feelings about her stepfather with group members  She accepted feedback and support from group members following her disclosure  P: Pt will continue to attend individual therapy bi-weekly and the Adult ED group on a biweekly basis  Pain Scale and Suicide Risk [facility]: Current Pain Assessment: moderate to severe   On a scale of 0 to 10, the patient rates current pain at 5   Behavioral Health Treatment Plan [facility]: Diagnosis and Treatment Plan explained to patient, patient relates understanding diagnosis and is agreeable to Treatment Plan  Assessment    1   Eating disorder, unspecified (307 24) (T15 4)    Signatures   Electronically signed by : Collin Marmolejo LCSW; Sep  1 2016  3:25PM EST                       (Author)

## 2018-01-16 NOTE — PSYCH
Progress Note  Psychotherapy Provided [facility]: Group Therapy provided today  Goals addressed in session:   1 D: PT  was seen for Group Therapy session  Pts engaged in activity, discussion re: the secondary losses associated with recovery  They were then challenged to think about what it would mean to "give these up"  A: Elizabeth shared her feelings with group members re: the progress and struggles she has in letting go of these  She accepted feedback and support, as well  P: Pt will continue to attend individual therapy weekly and the Adult ED group on a biweekly basis  Pain Scale and Suicide Risk [facility]: On a scale of 0 to 10, the patient rates current pain at 5   Behavioral Health Treatment Plan [facility]: Diagnosis and Treatment Plan explained to patient, patient relates understanding diagnosis and is agreeable to Treatment Plan  Assessment    1   Binge-eating disorder, severe (693 6) (I51 00)    Signatures   Electronically signed by : Trenton Fajardo LCSW; Nov 10 2016  2:48PM EST                       (Author)

## 2018-01-16 NOTE — PSYCH
Progress Note  Psychotherapy Provided [facility]: Group Therapy provided today  Goals addressed in session:   1 D: The above was seen for Group Therapy session  Group members engaged in a therapeutic discussion re: a blog post about the "secret" to recovery  This was processed and discussed  A:Elizabeth admitted that she has been struggling  She was able to share and to be supportive of other group members today  P: She will be encouraged to continue to attend individual therapy weekly and the Adult ED group on a biweekly basis  Pain Scale and Suicide Risk [facility]: On a scale of 0 to 10, the patient rates current pain at 5   Behavioral Health Treatment Plan [address] [place] [address] [address]: Diagnosis and Treatment Plan explained to patient, patient relates understanding diagnosis and is agreeable to Treatment Plan  Assessment    1   Binge-eating disorder, severe (983 6) (R66 99)    Signatures   Electronically signed by : Ceci Shrestha LCSW; Aug 18 2017 12:45PM EST                       (Author)

## 2018-01-17 ENCOUNTER — GENERIC CONVERSION - ENCOUNTER (OUTPATIENT)
Dept: OTHER | Facility: OTHER | Age: 33
End: 2018-01-17

## 2018-01-17 NOTE — PSYCH
Progress Note  Psychotherapy Provided [facility]: Group Therapy provided today  Goals addressed in session:   D: PT  was seen for Group Therapy session  PT engaged in a discussion re: video about negative self-talk and concept of recovery  A: Elizabeth shared about his own negative self-talk  She disclosed that her negative self-talk occurs on a frequent basis  She shared about her experience with a dietician she went to hear speak  She was receptive to feedback from peers  P: Triny Sanchez will continue to attend individual therapy once a week and the Adult ED group on a biweekly basis  Pain Scale and Suicide Risk [facility]: Current Pain Assessment: no pain   On a scale of 0 to 10, the patient rates current pain at 0   Current suicide risk is low   Behavioral Health Treatment Plan [address] [place] [address] [address]: Diagnosis and Treatment Plan explained to patient, patient relates understanding diagnosis and is agreeable to Treatment Plan  Assessment    1   Eating disorder, unspecified (307 50) (F50 9)    Signatures   Electronically signed by : Robi Serrano LCSW; May  2 2016  8:28AM EST                       (Author)

## 2018-01-17 NOTE — PSYCH
Progress Note  Psychotherapy Provided [facility]: Individual Psychotherapy 50 minutes and Telephone Contact provided today  Goals addressed in session:   1: Pt was seen today for Individual Therapy session  Pt reported that she invited her mother to attend today, but she was not feeling well enough to attend  Mom was contacted via phone in order to discuss Elizabteh's struggles to manage her lottery ticket purchases and need for her mother's assistance  Mom admitted that she was unaware her daughter continues to struggle and agreed to be more persistent  A: Elizabeth was not depressed during today's session  She is relieved that her dr has agreed she is OCD and that this is part of her struggle  Her difficulty breaking the cycle was reviewed  P:Elizabeth will continue to attend Individual and Group therapy on a weekly basis  Pain Scale and Suicide Risk [facility]: Current Pain Assessment: moderate to severe   On a scale of 0 to 10, the patient rates current pain at 6   Behavioral Health Treatment Plan [facility]: Diagnosis and Treatment Plan explained to patient, patient relates understanding diagnosis and is agreeable to Treatment Plan  Assessment    1  Bipolar I disorder, most recent episode depressed, moderate (296 52) (F31 32)   2  Eating disorder, unspecified (307 50) (F50 9)   3  EILEEN (generalized anxiety disorder) (300 02) (F41 1)   4  Obsessive-compulsive disorder (300 3) (F42)   5  History of Suicide and self-inflicted injury (C696 8) (X83 8XXA)   6  History of Sinus Surgery    Plan   1  Follow-up visit in 5 weeks Evaluation and Treatment  Follow-up  Status: Complete  Done:   [id number]   2  ARIPiprazole 30 MG Oral Tablet (Abilify); TAKE 1 TABLET AT BEDTIME   3  LamoTRIgine 200 MG Oral Tablet; TAKE 2 TABLETS AT BEDTIME   4  Lithium Carbonate  MG Oral Tablet Extended Release; TAKE 2 TABLETS   AT BEDTIME   5  LORazepam 0 5 MG Oral Tablet; Take 1 tablet 3 times daily  as needed   6   FLUoxetine HCl - 40 MG Oral Capsule; TAKE 1 CAPSULE DAILY   7   FLUoxetine HCl - 20 MG Oral Tablet    Signatures   Electronically signed by : Albert Figueredo LCSW; Jan 14 2016  3:32PM EST                       (Author)

## 2018-01-17 NOTE — PSYCH
Date of Initial Treatment Plan: 7/31/2008  Date of Current Treatment Plan: 9/23/2016  Treatment Plan 16  Strengths/Personal Resources for Self Care: Determined, intelligent, and gets along well with others  Diagnosis:   Axis I: ED, NOS, OCD, Bipolar D/O     Area of Needs: I still struggle with (wanting to) buy lottery tickets  I struggle with feeling like I will never get my list completed  I worry that I will never get better  Long Term Goals:   I want to continue to decrease the frequency of acting out  Target Date: 1/23/2017      I want to accept myself   Target Date: 1/23/2017      I want to learn to live a more calm and less anxious life  Target Date: 1/23/2017    Short Term Objectives:   Goal 1:   I will do my best to limit my purchase of lottery tickets  I will refrain from beating myself up if I relapse  Target Date: 12/23/2016      Goal 2:   I will continue to practice asserting myself  I will continue to learn about trauma by attending group therapy  Target Date: 12/23/2016      Goal 3:   I will manage my compulsivity and anxiety  I will think about a volunteer activity to try  I will continue to attend ED group  Target Date: 12/23/2016      GOAL 1: Modality: Individual 1 x per month Target Date: 1/23/2017   GOAL 1: Modality: Group 4 x per month Target Date: 1/23/2017      The person(s) responsible for carrying out the plan is Chiqui Peoples  GOAL 2: Modality: Individual 2 x per month Target Date: 1/23/2017   GOAL 2: Modality: Group 4 x per month Target Date: 1/23/2017      The person(s) responsible for carrying out the plan is Elizabeth  GOAL 3: Modality: Individual 2 x per month Target Date: 1/23/2017   GOAL 3: Modality: Group 4 x per month Target Date: 1/23/2017        The person(s) responsible for carrying out the plan is Elizabeth  The first scheduled review date is 1/23/2017  The expected length of service is 6 mos               CLIENT COMMENTS / Please share your thoughts, feelings, need and/or experiences regarding your treatment plan: _____________________________________________________________________________________________________________________________________________________________________________________________________________________________________________________________________________________________________________________ Date/Time: ______________     Patient Signature: _________________________________ Date/Time: ______________        1  Abnormal weight gain (783 1) (R63 5)   2  Acute diverticulitis (562 11) (K57 92)   3  Acute pain (338 19) (R52)   4  Allergic rhinitis (477 9) (J30 9)   5  Amenorrhea (626 0) (N91 2)   6  Bipolar 1 disorder, depressed, moderate (296 52) (F31 32)   7  Bulging lumbar disc (722 10) (M51 26)   8  Dizziness (780 4) (R42)   9  Dysfunctional uterine bleeding (626 8) (N93 8)   10  Dysuria (788 1) (R30 0)   11  Encounter for routine gynecological examination (V72 31) (Z01 419)   12  Esophageal reflux (530 81) (K21 9)   13  Foot pain (729 5) (M79 673)   14  EILEEN (generalized anxiety disorder) (300 02) (F41 1)   15  Gambling disorder, moderate (312 31) (F63 0)   16  Hypertension (401 9) (I10)   17  Hypothyroidism (244 9) (E03 9)   18  Long term current use of therapeutic drug (V58 69) (Z79 899)   19  Lumbago (724 2) (M54 5)   20  Lumbar degenerative disc disease (722 52) (M51 36)   21  Menorrhagia (626 2) (N92 0)   22  Morbid or severe obesity due to excess calories (278 01) (E66 01)   23  Obsessive-compulsive disorder (300 3) (F42)   24  Obstructive sleep apnea (327 23) (G47 33)   25  Otalgia, unspecified laterality (388 70) (H92 09)   26  Other eating disorders (307 59) (F50 8)   27  Prediabetes (790 29) (R73 09)   28  RLS (restless legs syndrome) (333 94) (G25 81)   29  Urinary tract infection (599 0) (N39 0)   30  URTI (acute upper respiratory infection) (465 9) (J06 9)   31  Viral bronchitis (466 0) (J20 8)   32   Vitamin D deficiency (058 7) (N51 9)     Electronically signed by : Alysha Sue LCSW; Sep 23 2016  9:40AM EST                       (Author)

## 2018-01-17 NOTE — PSYCH
Progress Note  Psychotherapy Provided [facility]: Group Therapy provided today  Goals addressed in session:   1 D: The above was seen for Group Therapy session  Group members engaged in a discussion re: the death of a fellow group member  A:Elizabeth was open in discussing the loss of the above group member  She also shared re: her night eating and work to decrease this behavior  P: She will be encouraged to continue to attend individual therapy weekly and the Adult ED group on a biweekly basis  Pain Scale and Suicide Risk [facility]: On a scale of 0 to 10, the patient rates current pain at 4   Behavioral Health Treatment Plan [facility]: Diagnosis and Treatment Plan explained to patient, patient relates understanding diagnosis and is agreeable to Treatment Plan  Assessment    1   Binge-eating disorder, severe (163 6) (L73 30)    Signatures   Electronically signed by : Lindon Caller, LCSW; Oct 27 2017 10:19AM EST                       (Author)

## 2018-01-17 NOTE — PSYCH
Message  Message Free Text Note Form: spoke to Froylan Kelley, [facility]   Yes will take her and Sandeep  Called [facility] to get funding approval or denial  [phone]  Called Sophia Helmadan- needs to come in to do Intake and Liability form   call to make appt  [address] [facility] 1618 or Livia [place] [facility] St[place] also asked lots of questions-- told her about Sandeep, [facility]!!! VERY helpful!!!!!!! Viji Pearl is her supervisor  Active Problems    1  Abnormal weight gain (783 1) (R63 5)   2  Allergic rhinitis (477 9) (J30 9)   3  Amenorrhea (626 0) (N91 2)   4  Binge-eating disorder, severe (783 6) (F50 81)   5  Bulging lumbar disc (722 10) (M51 26)   6  Dysuria (788 1) (R30 0)   7  Esophageal reflux (530 81) (K21 9)   8  Foot pain (729 5) (M79 673)   9  EILEEN (generalized anxiety disorder) (300 02) (F41 1)   10  Gambling disorder, moderate (312 31) (F63 0)   11  Hypertension (401 9) (I10)   12  Hypothyroidism (244 9) (E03 9)   13  Long term current use of therapeutic drug (V58 69) (Z79 899)   14  Lumbago (724 2) (M54 5)   15  Lumbar degenerative disc disease (722 52) (M51 36)   16  Morbid or severe obesity due to excess calories (278 01) (E66 01)   17  Need for immunization against influenza (V04 81) (Z23)   18  Nocturnal enuresis (788 36) (N39 44)   19  Obsessive-compulsive disorder (300 3) (F42 9)   20  Other abnormal glucose (790 29) (R73 09)   21  Overactive bladder (596 51) (N32 81)   22  PTSD (post-traumatic stress disorder) (309 81) (F43 10)   23  RLS (restless legs syndrome) (333 94) (G25 81)   24  Severe bipolar I disorder, most recent episode depressed (296 53) (F31 4)   25  Urgency incontinence (788 31) (N39 41)   26  Urinary incontinence, unspecified type (788 30) (R32)   27  Urinary tract infection, site unspecified (599 0) (N39 0)   28  Vitamin D deficiency (268 9) (E55 9)    Current Meds   1   HydrOXYzine HCl - 50 MG Oral Tablet; TAKE 1 1/2 TO 2 TABLETS AT BEDTIME AS   NEEDED FOR INSOMNIA; Therapy: 42HJK2404 to (Evaluate:70Jmo9013)  Requested for: 21BNR0106; Last   Rx:13Jun2017 Ordered   2  LamoTRIgine 200 MG Oral Tablet; TAKE 2 TABLETS AT BEDTIME; Therapy: 02SEB6050 to (97 [id number])  Requested for: 74Gxq5988; Last   Rx:06Syi4408 Ordered   3  Latuda 120 MG Oral Tablet; TAKE 1 TABLET IN THE EVENING WITH FOOD; Therapy: 50Vhd3760 to (21 )  Requested for: 65XDG7899; Last   Rx:02Pam2820 Ordered   4  Levocetirizine Dihydrochloride 5 MG Oral Tablet; TAKE 1 TABLET BY MOUTH ONCE A   DAY; Therapy: 19Giq2925 to (Evaluate:16Nov2017)  Requested for: 36Dkl0613; Last   Rx:35Bqc0523 Ordered   5  Levothyroxine Sodium 100 MCG Oral Tablet (Synthroid); take 1 tablet by mouth every day; Therapy: 08LSU6196 to (Yissel Hicks)  Requested for: 01Mie1330; Last   Rx:86Afn1712 Ordered   6  Lisinopril 10 MG Oral Tablet; TAKE 1 TABLET BY MOUTH EVERY DAY FOR BLOOD   PRESSURE; Therapy: 19UYA6604 to (97 [id number])  Requested for: 59Dfo5832; Last   Rx:75Yev0278 Ordered   7  Lithium Carbonate  MG Oral Tablet Extended Release; take 2 tablets at bedtime; Therapy: 81XSF8638 to (Evaluate:09Jan2018)  Requested for: 94Ayu6295; Last   Rx:84Kst1524 Ordered   8  LORazepam 0 5 MG Oral Tablet; Take 1 tablet 3 times daily  as needed; Therapy: 44Vwj4938 to (Evaluate:10Nov2017); Last Rx:73Gvx0222 Ordered   9  Myrbetriq 50 MG Oral Tablet Extended Release 24 Hour; Take one tablet daily; Therapy: 19AFS2821 to (Last Rx:53Aaq6330)  Requested for: 54Njh6789 Ordered   10  Neurontin 600 MG Oral Tablet (Gabapentin); TAKE1 5 TABS DAILY; Therapy: (Recorded:30Oct2017) to Recorded   11  Omeprazole 20 MG Oral Capsule Delayed Release; take 1 capsule daily as needed; Therapy: 56PQK0799 to (Marlyce Copper)  Requested for: 58GAV7340; Last    Rx:15Gos0248 Ordered   12  ROPINIRole HCl - 3 MG Oral Tablet; Take 1 tab BID;     Therapy: 93JOT2248 to (Last Rx:77Rvu8494)  Requested for: 56Gss5948 Ordered   13  Sertraline HCl - 100 MG Oral Tablet; TAKE 2 TABLET Every morning; Therapy: (Recorded:30Oct2017) to Recorded    Allergies    1  No Known Drug Allergies    2   Seasonal    Signatures   Electronically signed by : Calli Garcia LCSW; Nov 2 2017  4:40PM EST                       (Author)

## 2018-01-17 NOTE — PSYCH
Psych Med Mgmt    Appearance: was calm and cooperative, adequate hygiene and grooming and good eye contact  Observed mood: depressed  Observed mood: affect was constricted  Speech: pressured  Thought processes: coherent/organized  Hallucinations: no hallucinations present  Thought Content: no delusions  Abnormal Thoughts: The patient has no suicidal thoughts and no homicidal thoughts  Orientation: The patient is oriented to person, place and time  Recent and Remote Memory: short term memory intact and long term memory intact  Attention Span And Concentration: concentration impaired  Insight: Insight intact  Judgment: Her judgment was intact  Muscle Strength And Tone  Muscle strength and tone were normal  Normal gait and station  Language: no difficulty naming common objects, no difficulty repeating a phrase and no difficulty writing a sentence  Fund of knowledge: Patient displays adequate knowledge of current events, adequate fund of knowledge regarding past history and adequate fund of knowledge regarding vocabulary  The patient is experiencing no localized pain  On a scale of 0 - 10 the pain severity is a 0  Treatment Recommendations: Increase Lithium to 1125 mg at bedtime  Recheck Lithium level in 1 week - will try ti keep Lithium level around 0 8 to 0 9  Continue Ativan 0 5 mg tid PRN  Continue Latuda 120 mg in the evening  Continue Lamictal 400 mg at bedtime  Continue Prozac 20 mg daily  Continue therapy with Sridhar Valentin; also attends eating disorders group and trauma group  Follows with PCP for glucose and lipid monitoring  Risks, Benefits And Possible Side Effects Of Medications: Risks, benefits, and possible side effects of medications explained to patient and patient verbalizes understanding, Risks of medications explained if female patient  Patient verbalizes understanding and agrees to notify her doctor if she becomes pregnant             Discussed with patient the risks of sedation, respiratory depression, impairment of ability to drive and potential for abuse and addiction related to treatment with benzodiazepine medications  The patient understands risk of treatment with benzodiazepine medications, agrees to not drive if feels impaired and agrees to take medications as prescribed  The patient has been filling controlled prescriptions on time as prescribed to [employer] [facility] [facility] program       She reports increased appetite, normal energy level, recent 1 lbs weight loss and decrease in number of sleep hours 6  Heena Rhody states she has been experiencing more mood swings since last visit  She felt "manicky" earlier this week, last few days she has been feeling depressed  Anxiety and obsessive thoughts are controlled otherwise  No suicidal or homicidal ideation  No psychotic symptoms  Sleep is decreased  Appetite is increased  No side effects from medications reported  No rash  Labs reviewed form January 30, 2017 - lithium level is 0 6, BMP normal       Vitals  Signs   Recorded: 06QSL2333 02:09PM   Heart Rate: 75  Systolic: 383  Diastolic: 82  BP Cuff Size: Extra-Large  Height: 5 ft 8 in  Weight: 373 lb   BMI Calculated: 56 71  BSA Calculated: 2 66    Assessment    1  Bipolar I disorder, most recent episode mixed, moderate (296 62) (F31 62)   2  Binge-eating disorder, severe (783 6) (F50 81)   3  EILEEN (generalized anxiety disorder) (300 02) (F41 1)   4  Obsessive-compulsive disorder (300 3) (F42 9)    Plan    1  From  Lithium Carbonate  MG Oral Tablet Extended Release take 2   tablets at bedtime To Lithium Carbonate  MG Oral Tablet Extended Release Take   2 and 1/2 tablets at bedtime    2  (1) LITHIUM; Status:Active; Requested for:10Mar2017;     3   Follow-up Visit in 4 Weeks Evaluation and Treatment  Follow-up  Status: Hold For -   Scheduling  Requested for: 63XBQ9485    Review of Systems    Constitutional: recent 1 lb weight loss    Cardiovascular: no complaints of slow or fast heart rate, no chest pain, no palpitations  Respiratory: no complaints of shortness of breath, no wheezing, no dyspnea on exertion  Gastrointestinal: no complaints of abdominal pain, no constipation, no nausea, no diarrhea, no vomiting  Genitourinary: no complaints of dysuria, no incontinence, no pelvic pain, no urinary frequency  Musculoskeletal: no complaints of arthralgia, no myalgias, no limb pain, no joint stiffness  Integumentary: no complaints of skin rash, no itching, no dry skin  Neurological: no complaints of headache, no confusion, no numbness, no dizziness  Past Psychiatric History    Past Psychiatric History: Multiple past inpatient psychiatric admissions  One past suicide attempt by overdose in 2009  Substance Abuse Hx    Substance Abuse History: No drug or alcohol use  Active Problems    1  Abnormal weight gain (783 1) (R63 5)   2  Acute diverticulitis (562 11) (K57 92)   3  Acute pain (338 19) (R52)   4  Allergic rhinitis (477 9) (J30 9)   5  Amenorrhea (626 0) (N91 2)   6  Binge-eating disorder, severe (783 6) (F50 81)   7  Bronchitis (490) (J40)   8  Bulging lumbar disc (722 10) (M51 26)   9  Dysuria (788 1) (R30 0)   10  Esophageal reflux (530 81) (K21 9)   11  Foot pain (729 5) (M79 673)   12  EILEEN (generalized anxiety disorder) (300 02) (F41 1)   13  Gambling disorder, moderate (312 31) (F63 0)   14  Hypertension (401 9) (I10)   15  Hypothyroidism (244 9) (E03 9)   16  Long term current use of therapeutic drug (V58 69) (Z79 899)   17  Lumbago (724 2) (M54 5)   18  Lumbar degenerative disc disease (722 52) (M51 36)   19  Morbid or severe obesity due to excess calories (278 01) (E66 01)   20  Obsessive-compulsive disorder (300 3) (F42 9)   21  Prediabetes (790 29) (R73 09)   22  RLS (restless legs syndrome) (333 94) (G25 81)   23  Vitamin D deficiency (268 9) (E55 9)    Past Medical History    1   History of Abscess of face (682 0) (L02 01)   2  History of Acute nonsuppurative otitis media, unspecified laterality (381 00) (H65 199)   3  History of Anorexia nervosa in remission (307 1) (F50 00)   4  History of Backache (724 5) (M54 9)   5  History of Candidiasis, cutaneous (112 3) (B37 2)   6  History of Dog bite (879 8,E906 0) (W54  0XXA)   7  History of Dysfunction of left eustachian tube (381 81) (H69 82)   8  History of acute sinusitis (V12 69) (Z87 09)   9  Denied: History of head injury   10  History of Pseudotumor cerebri (348 2) (G93 2)   11  Denied: History of Seizures   12  History of Suicide and self-inflicted injury (C231 0) (C26 2CEC)    The active problems and past medical history were reviewed and updated today  Allergies    1  No Known Drug Allergies    Current Meds   1  Accu-Chek Martha Device; test 4 times daily; Therapy: 43HDR6111 to (Last Rx:48Dso1515)  Requested for: 07TIG7727 Ordered   2  Accu-Chek Martha STRP; TEST 4 TIMES DAILY; Therapy: 84HUJ9592 to (Evaluate:63Crg5892)  Requested for: 08EJT5198; Last   Rx:59Qum9581 Ordered   3  Accu-Chek Soft Touch Lancets Miscellaneous; test blood sugar four times daily; Therapy: 72QNE8486 to (Evaluate:13Apr2016)  Requested for: 39OXL2223; Last   Rx:20Gid1833 Ordered   4  FLUoxetine HCl - 20 MG Oral Capsule; TAKE 1 CAPSULE DAILY; Therapy: 01GFT8914 to (Tanda Popper)  Requested for: 83NOB6941; Last   Rx:27Jan2017 Ordered   5  LamoTRIgine 200 MG Oral Tablet; TAKE 2 TABLETS AT BEDTIME; Therapy: 88FWH3706 to (Tanda Popper)  Requested for: 00RHI5263; Last   Rx:27Jan2017 Ordered   6  Latuda 120 MG Oral Tablet; TAKE 1 TABLET IN THE EVENING WITH FOOD; Therapy: 37Xnv3774 to (Tanda Popper)  Requested for: 35ADR1973; Last   Rx:27Jan2017 Ordered   7  Levocetirizine Dihydrochloride 5 MG Oral Tablet; TAKE 1 TABLET BY MOUTH ONCE A   DAY; Therapy: 27Apr2015 to (Evaluate:18Apr2017)  Requested for: 25CAQ9702; Last   Rx:32Uqc7369 Ordered   8  Levothyroxine Sodium 100 MCG Oral Tablet; take 1 tablet by mouth every day; Therapy: 54QYW0770 to (Evaluate:28Vwj6519)  Requested for: 32IVQ8197; Last   Rx:09Nov2016 Ordered   9  Lisinopril 10 MG Oral Tablet; TAKE 1 TABLET BY MOUTH EVERY DAY FOR BLOOD   PRESSURE; Therapy: 10HLY1935 to (Evaluate:54Twf0094)  Requested for: 77DGU4718; Last   Rx:15Jan2017 Ordered   10  Lithium Carbonate  MG Oral Tablet Extended Release; take 2 tablets at bedtime; Therapy: 15IAI0924 to (Tiffanie Rush)  Requested for: 96CKN8637; Last    Rx:27Jan2017 Ordered   11  LORazepam 0 5 MG Oral Tablet; Take 1 tablet 3 times daily  as needed; Therapy: 83Nxn9155 to (Evaluate:05Vci9514); Last Rx:27Jan2017 Ordered   12  MetFORMIN HCl  MG Oral Tablet Extended Release 24 Hour; TAKE 1 TABLET BY    MOUTH EVERY DAY WITH SUPPER; Therapy: 79DSA8681 to (Evaluate:01Ggd7594)  Requested for: 34AXW5916; Last    Rx:75Rce2569 Ordered   13  Neurontin 300 MG Oral Capsule; take 1 capsule daily; Therapy: (Recorded:21Xpx5116) to Recorded   14  Omeprazole 20 MG Oral Capsule Delayed Release; take 1 capsule daily as needed; Therapy: 38CCS1839 to ([phone] [id number])  Requested for: 11Lue6010; Last    Rx:49Xxg3130 Ordered   15  Proventil  (90 Base) MCG/ACT Inhalation Aerosol Solution; 2 PUFFS Q 4 HRS; Therapy: 62VVO7661 to (Last CU:50ESG4868)  Requested for: 50BBD8270 Ordered   16  ROPINIRole HCl - 2 MG Oral Tablet; Take 1 tablet by mouth at bedtime; Therapy: 26Cxj8570 to (Evaluate:30Jun2017)  Requested for: 08GIY9203; Last    Rx:02Mar2017 Ordered    The medication list was reviewed and updated today  Family Psych History  Mother    1  Family history of bicuspid aortic valve (V17 49) (Z82 79)   2  Family history of depression (V17 0) (Z81 8)   3  Family history of Hypertension (V17 49)   4  Family history of Status post aortic valve replacement  Father    5  Family history of Hypertension (V17 49)   6   Family history of Hypothyroidism   7  No family history of mental disorder  Brother    8  Family history of Hypertension (V17 49)  Maternal Grandmother    9  Family history of Cancer   10  Family history of cerebrovascular accident (V17 1) (Z82 3)   11  Family history of Heart Disease (V17 49)  Paternal Grandmother    15  Family history of Cancer   13  Family history of malignant neoplasm (V16 9) (Z80 9)  Maternal Grandfather    15  Family history of pneumonia (V18 8) (Z83 1)  Paternal Grandfather    13  Family history of pneumonia (V18 8) (Z83 1)  Family History    12  Family history of Arthritis (716 90) (M19 90)   17  Family history of Breast Cancer (V16 3)   18  Family history of osteoporosis (V17 81) (Z82 62)   19  Family history of Hypertension (V17 49)   20  Family history of Transient Ischemic Attack    The family history was reviewed and updated today  Social History    · Caffeine Use   · Currently sexually active   · Disabled   · Former smoker (L55 67) (M94 751)   · High school graduate   · Lives with parents   · Never smoked   · No alcohol use   · Tea  The social history was reviewed and updated today  The social history was reviewed and is unchanged  Single, lives with mother and stepfather  No children  Some college  On disability  Worked in past in retail  No history of legal problems  No  history  History Of Phys/Sex Abuse Or Perpetration    History Of Phys/Sex Abuse or Perpetration: No history of physical or sexual abuse  End of Encounter Meds    1  Levocetirizine Dihydrochloride 5 MG Oral Tablet; TAKE 1 TABLET BY MOUTH ONCE A   DAY; Therapy: 27Apr2015 to (Evaluate:18Apr2017)  Requested for: 71EWR9963; Last   Rx:75Anz9635 Ordered    2  LamoTRIgine 200 MG Oral Tablet; TAKE 2 TABLETS AT BEDTIME; Therapy: 18YEC3111 to ([place])  Requested for: 60QJA6900; Last   Rx:27Jan2017 Ordered   3  Latuda 120 MG Oral Tablet; TAKE 1 TABLET IN THE EVENING WITH FOOD;    Therapy: 21Apr2016 to (Nathan Braun)  Requested for: 95JBH8838; Last   BR:70FCC9984 Ordered    4  FLUoxetine HCl - 20 MG Oral Capsule; TAKE 1 CAPSULE DAILY; Therapy: 76FAL4221 to (Nathan Braun)  Requested for: 84LEK2174; Last   Rx:27Jan2017 Ordered    5  Lithium Carbonate  MG Oral Tablet Extended Release; Take 2 and 1/2 tablets at   bedtime; Therapy: 14CUF9275 to ([phone])  Requested for: 12LOQ1587; Last   Rx:03Mar2017 Ordered    6  Omeprazole 20 MG Oral Capsule Delayed Release; take 1 capsule daily as needed; Therapy: 15MVG2255 to ([phone])  Requested for: 13Dly4029; Last   Rx:27Feb2017 Ordered    7  LORazepam 0 5 MG Oral Tablet; Take 1 tablet 3 times daily  as needed; Therapy: 50Doj0133 to (Evaluate:31May2017); Last Rx:27Jan2017 Ordered    8  Lisinopril 10 MG Oral Tablet; TAKE 1 TABLET BY MOUTH EVERY DAY FOR BLOOD   PRESSURE; Therapy: 87YOJ1122 to (Evaluate:15May2017)  Requested for: 71CJE8643; Last   Rx:15Jan2017 Ordered    9  Levothyroxine Sodium 100 MCG Oral Tablet (Synthroid); take 1 tablet by mouth every day; Therapy: 93VAY1245 to (Evaluate:63Dud0917)  Requested for: 80UKP9364; Last   Rx:09Nov2016 Ordered    10  Proventil  (90 Base) MCG/ACT Inhalation Aerosol Solution; 2 PUFFS Q 4 HRS; Therapy: 45CCN6942 to (Last Rx:27Jan2016)  Requested for: 40NAV5822 Ordered    11  Accu-Chek Martha Device; test 4 times daily; Therapy: 49HJK5246 to (Last Rx:15Uqb8266)  Requested for: 49BYQ0163 Ordered   12  Accu-Chek Martha STRP; TEST 4 TIMES DAILY; Therapy: 00FNY7719 to (Evaluate:68Gsl2862)  Requested for: 83PPW7530; Last    Rx:38Mwy5116 Ordered   13  Accu-Chek Soft Touch Lancets Miscellaneous; test blood sugar four times daily; Therapy: 39GYQ1036 to (Evaluate:13Apr2016)  Requested for: 65THI2837; Last    Rx:53Toj0840 Ordered   14  MetFORMIN HCl  MG Oral Tablet Extended Release 24 Hour; TAKE 1 TABLET BY    MOUTH EVERY DAY WITH SUPPER;     Therapy: 99YXK2928 to (Evaluate:89Frs5550)  Requested for: 09KKF5218; Last    Rx:06Feb2017 Ordered    15  ROPINIRole HCl - 2 MG Oral Tablet; Take 1 tablet by mouth at bedtime; Therapy: 73Kxt0119 to (Evaluate:30Jun2017)  Requested for: 31KUO3941; Last    Rx:02Mar2017 Ordered    16  Neurontin 300 MG Oral Capsule (Gabapentin); take 1 capsule daily; Therapy: (Recorded:22Uzb6979) to Recorded    Future Appointments    Date/Time Provider Specialty Site   04/07/2017 02:15 PM PAUL Samson   Psychiatry [facility]   03/07/2017 05:00 PM Brent Pineda [address] [facility]   03/08/2017 01:00 PM Brent Pineda [address] [facility]   03/14/2017 05:00 PM Brent Pineda [address] [facility]   03/16/2017 03:00 PM Brent Pineda [address] [facility]   03/21/2017 05:00 PM Brent Pineda [place]   03/23/2017 01:00 PM Brent Pineda [place]   03/28/2017 05:00 PM Brent Pineda [place]   03/30/2017 01:00 PM Brent Pineda [address] [facility]   04/04/2017 05:00 PM Brent Pineda [place]   04/06/2017 01:00 PM Brent Pineda [address] [facility]   04/10/2017 09:00 AM Brent Pineda [address] [facility]   04/11/2017 05:00 PM Brent Pineda [place]   04/20/2017 01:00 PM Preet Calderon [facility]   04/28/2017 09:00 AM Brent Pineda [place]   05/05/2017 01:00 PM Brent Pineda [address] [facility]   06/26/2017 08:30 AM Edson Quinteros [address]     Signatures   Electronically signed by : PAUL Caba ; Mar  3 2017  2:22PM EST                       (Author)

## 2018-01-17 NOTE — PSYCH
Progress Note  Psychotherapy Provided [facility]: Group Therapy provided today  Goals addressed in session:   1 D: Pt attended Trauma Group  Pts engaged in a discussion of safe places--when, where and with whom in their lives they have felt safe  A: Elizabeth spoke animatedly and at length in group today  She reported that she feels safe in therapy and with others that she trusts but ânot with herselfâ  P: Pt will continue to be seen for individual and group therapy during which the above will be further processed  Pain Scale and Suicide Risk [facility]: On a scale of 0 to 10, the patient rates current pain at 6   Behavioral Health Treatment Plan [facility]: Diagnosis and Treatment Plan explained to patient, patient relates understanding diagnosis and is agreeable to Treatment Plan  Assessment    1   EILEEN (generalized anxiety disorder) (300 02) (F41 1)    Signatures   Electronically signed by : Lindon Caller, LCSW; Oct  5 2016 10:21AM EST                       (Author)

## 2018-01-17 NOTE — PSYCH
Progress Note  Psychotherapy Provided [facility]: Group Therapy provided today  Goals addressed in session:   1 D: PT  was seen for Group Therapy session  Pts engaged in a therapeutic discussion re: how their ED behaviors have helped them to cope with family issues  Ways to heal / forgive themselves were discussed  A: Elizabeth struggles with this subject, idea but was able to participate in the group discussion today  P: Pt will be encouraged to continue to attend individual therapy weekly and the Adult ED group on a biweekly basis  Pain Scale and Suicide Risk [facility]: Current Pain Assessment: moderate to severe   On a scale of 0 to 10, the patient rates current pain at 6   Behavioral Health Treatment Plan [facility]: Diagnosis and Treatment Plan explained to patient, patient relates understanding diagnosis and is agreeable to Treatment Plan  Assessment    1   Binge-eating disorder, severe (991 1) (P61 83)    Signatures   Electronically signed by : Hima Tan LCSW; May 26 2017 12:52PM EST                       (Author)

## 2018-01-17 NOTE — PSYCH
Progress Note  Psychotherapy Provided [facility]: Individual Psychotherapy 50 minutes provided today  Goals addressed in session:   1-3 D: Met with pt for Individual Therapy session  Elizabeth spoke today about her feelings re: her food related behaviors  She shared how much time she spent losing weight and the shame she feels that she has gained so much weight since recovering from Anorexia  A: Elizabeth acknowledged that she spends a considerable amount of time comparing herself to others, particularly those who are thinner than her and are employed  P: Upcoming sessions will be used to continue to address the above  She will continue to attend both weekly Trauma and ED group therapy sessions  Pain Scale and Suicide Risk [facility]: On a scale of 0 to 10, the patient rates current pain at 4   Behavioral Health Treatment Plan [facility]: Diagnosis and Treatment Plan explained to patient, patient relates understanding diagnosis and is agreeable to Treatment Plan  Assessment    1  Binge-eating disorder, severe (783 6) (F50 81)   2  Bipolar I disorder, most recent episode depressed, mild (296 51) (F31 31)   3  EILEEN (generalized anxiety disorder) (300 02) (F41 1)   4   Obsessive-compulsive disorder (300 3) (F42 9)    Signatures   Electronically signed by : Stephanie Romero LCSW; Feb 10 2017  9:13AM EST                       (Author)

## 2018-01-17 NOTE — PSYCH
Progress Note  Psychotherapy Provided [facility]: Group Therapy provided today  Goals addressed in session:   1 D: The above was seen for Group Therapy session  Group members engaged in a discussion re:the harmful effects of parentification  A:Elizabeth was open in sharing her experiences and feelings from her childhood re: the above  P: She will be encouraged to continue to attend individual therapy weekly and the Adult ED group on a biweekly basis  Pain Scale and Suicide Risk [facility]: On a scale of 0 to 10, the patient rates current pain at 4   Behavioral Health Treatment Plan [facility]: Diagnosis and Treatment Plan explained to patient, patient relates understanding diagnosis and is agreeable to Treatment Plan  Assessment    1   Binge-eating disorder, severe (783 6) (Y42 85)    Signatures   Electronically signed by : Jarrod Sanchez LCSW; Nov 10 2017  9:14AM EST                       (Author)

## 2018-01-17 NOTE — PSYCH
Progress Note  Psychotherapy Provided [facility]: Individual Psychotherapy 50 minutes and Telephone Contact provided today  Goals addressed in session:   1: Pt was seen today for Individual Therapy session  Pt reported that she again invited her mother to attend today, but she was not feeling well enough to attend  Elizabeth reported that she thinks her mother is somewhat "lazy," and this was explored  Elizabeth reported that she was proud that she had only spent $6 this week on lottery tickets and had been honest with her mother about doing so  A: Elizabeth was not depressed during today's session  She admitted that she is often self defeating and engages in negative self talk  Stopping this cycle was discussed  P:Elizabeth will continue to attend Individual and Group therapy on a weekly basis  Pain Scale and Suicide Risk [facility]: Current Pain Assessment: moderate to severe   On a scale of 0 to 10, the patient rates current pain at 6   Behavioral Health Treatment Plan [facility]: Diagnosis and Treatment Plan explained to patient, patient relates understanding diagnosis and is agreeable to Treatment Plan  Assessment    1  Bipolar I disorder, most recent episode depressed, moderate (296 52) (F31 32)   2  Eating disorder, unspecified (307 50) (F50 9)   3  Gambling disorder, moderate (312 31) (F63 0)   4  Obsessive-compulsive disorder (300 3) (F42)   5   EILEEN (generalized anxiety disorder) (300 02) (F41 1)    Signatures   Electronically signed by : Tawanna Padilla LCSW; Jan 20 2016  4:57PM EST                       (Author)

## 2018-01-17 NOTE — PSYCH
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  A: Elizabeth again allowed this worker to provide her with validation and support  She continues to make progress in addressing her issues even though it remains difficult for her  P:Elizabeth will continue to attend Individual and Group therapy on a weekly basis  Pain Scale and Suicide Risk [facility]: On a scale of 0 to 10, the patient rates current pain at 3   Current suicide risk is low   Behavioral Health Treatment Plan [facility]: Diagnosis and Treatment Plan explained to patient, patient relates understanding diagnosis and is agreeable to Treatment Plan  Assessment    1  Bipolar 1 disorder, depressed, moderate (296 52) (F31 32)   2  Eating disorder, unspecified (307 50) (F50 9)   3  Gambling disorder, moderate (312 31) (F63 0)   4   Obsessive-compulsive disorder (300 3) (F42)    Signatures   Electronically signed by : Margaret Hernández LCSW; Sep  8 2016  2:43PM EST                       (Author)

## 2018-01-17 NOTE — PSYCH
Message  Message Free Text Note Form: called [facility]  housing no apts, only group home settings  familia does not have anything different    therapy dog- will have to be taken up with coordinator  (never had to ask that question) fill out referral have dr Alida Liu, then he wcb to set up [phone]      Active Problems    1  Abnormal weight gain (783 1) (R63 5)   2  Allergic rhinitis (477 9) (J30 9)   3  Amenorrhea (626 0) (N91 2)   4  Binge-eating disorder, severe (783 6) (F50 81)   5  Bulging lumbar disc (722 10) (M51 26)   6  Dysuria (788 1) (R30 0)   7  Esophageal reflux (530 81) (K21 9)   8  Foot pain (729 5) (M79 673)   9  EILEEN (generalized anxiety disorder) (300 02) (F41 1)   10  Gambling disorder, moderate (312 31) (F63 0)   11  Hypertension (401 9) (I10)   12  Hypothyroidism (244 9) (E03 9)   13  Long term current use of therapeutic drug (V58 69) (Z79 899)   14  Lumbago (724 2) (M54 5)   15  Lumbar degenerative disc disease (722 52) (M51 36)   16  Morbid or severe obesity due to excess calories (278 01) (E66 01)   17  Need for immunization against influenza (V04 81) (Z23)   18  Nocturnal enuresis (788 36) (N39 44)   19  Obsessive-compulsive disorder (300 3) (F42 9)   20  Other abnormal glucose (790 29) (R73 09)   21  Overactive bladder (596 51) (N32 81)   22  PTSD (post-traumatic stress disorder) (309 81) (F43 10)   23  RLS (restless legs syndrome) (333 94) (G25 81)   24  Severe bipolar I disorder, most recent episode depressed (296 53) (F31 4)   25  Urgency incontinence (788 31) (N39 41)   26  Urinary incontinence, unspecified type (788 30) (R32)   27  Urinary tract infection, site unspecified (599 0) (N39 0)   28  Vitamin D deficiency (268 9) (E55 9)    Current Meds   1  HydrOXYzine HCl - 50 MG Oral Tablet; TAKE 1 1/2 TO 2 TABLETS AT BEDTIME AS   NEEDED FOR INSOMNIA; Therapy: 92ZWO4899 to (Evaluate:12Vnj7124)  Requested for: 19AQZ7033; Last   Rx:13Jun2017 Ordered   2  LamoTRIgine 200 MG Oral Tablet; TAKE 2 TABLETS AT BEDTIME; Therapy: 75OMP6133 to (22 491667)  Requested for: 89Utd6119; Last   Rx:48Tpp8873 Ordered   3  Latuda 120 MG Oral Tablet; TAKE 1 TABLET IN THE EVENING WITH FOOD; Therapy: 55Ulr7649 to (03 17 74 30 53)  Requested for: 43BBP4584; Last   Rx:53Tjx0050 Ordered   4  Levocetirizine Dihydrochloride 5 MG Oral Tablet; TAKE 1 TABLET BY MOUTH ONCE A   DAY; Therapy: 85Gkx8798 to (Evaluate:16Nov2017)  Requested for: 02Bwg6632; Last   Rx:93Cwp2638 Ordered   5  Levothyroxine Sodium 100 MCG Oral Tablet (Synthroid); take 1 tablet by mouth every day; Therapy: 92LWF4831 to (Katerin Acosta)  Requested for: 73Hrw2913; Last   Rx:84Vuh8287 Ordered   6  Lisinopril 10 MG Oral Tablet; TAKE 1 TABLET BY MOUTH EVERY DAY FOR BLOOD   PRESSURE; Therapy: 70UOM1393 to (22 920215)  Requested for: 34Ozr9573; Last   Rx:76Wwf7744 Ordered   7  Lithium Carbonate  MG Oral Tablet Extended Release; take 2 tablets at bedtime; Therapy: 12VIL2232 to (Evaluate:09Jan2018)  Requested for: 28Fyw9267; Last   Rx:38Udk0454 Ordered   8  LORazepam 0 5 MG Oral Tablet; Take 1 tablet 3 times daily  as needed; Therapy: 99Fph4143 to (Evaluate:10Nov2017); Last Rx:07Dtv4555 Ordered   9  Myrbetriq 50 MG Oral Tablet Extended Release 24 Hour; Take one tablet daily; Therapy: 27DWB9960 to (Last Rx:05Oiy5523)  Requested for: 09Lyw0103 Ordered   10  Neurontin 600 MG Oral Tablet (Gabapentin); TAKE1 5 TABS DAILY; Therapy: (Recorded:30Oct2017) to Recorded   11  Omeprazole 20 MG Oral Capsule Delayed Release; take 1 capsule daily as needed; Therapy: 93IVQ0849 to (26 187090)  Requested for: 78POV5840; Last    Rx:69Mib1931 Ordered   12  ROPINIRole HCl - 3 MG Oral Tablet; Take 1 tab BID; Therapy: 81Adn0836 to (Last Rx:41Lpa8708)  Requested for: 71Hmt2496 Ordered   13  Sertraline HCl - 100 MG Oral Tablet; TAKE 2 TABLET Every morning;     Therapy: (Recorded:30Oct2017) to Recorded    Allergies    1  No Known Drug Allergies    2   Seasonal    Signatures   Electronically signed by : Felix Perez LCSW; Nov 2 2017  1:20PM EST                       (Author)

## 2018-01-17 NOTE — PSYCH
Progress Note  Psychotherapy Provided [facility]: Group Therapy provided today  Goals addressed in session:   1 D: Above individual attended Trauma Group  Pts engaged in a discussion re: toxic parenting and invalidating environments  A: Elizabeth struggled with today's discussion but participated and shared nonetheless while also supporting her peers in doing so  P: She will continue to be seen for individual and group therapy during which the above will be further processed  Pain Scale and Suicide Risk [facility]: Current Pain Assessment: moderate to severe   On a scale of 0 to 10, the patient rates current pain at 5   Behavioral Health Treatment Plan [address] [place] [address] [address]: Diagnosis and Treatment Plan explained to patient, patient relates understanding diagnosis and is agreeable to Treatment Plan  Assessment    1   EILEEN (generalized anxiety disorder) (300 02) (F41 1)    Signatures   Electronically signed by : Calli Garcia LCSW; Sep  8 2017 12:10PM EST                       (Author)

## 2018-01-17 NOTE — PSYCH
Progress Note  Psychotherapy Provided [facility]: Family Therapy provided today  Goals addressed in session:   1: Pt was seen with her mother and stepfather today for a Family Therapy session  Pt's mother reported that she has remained focused on selling her car, but she herself believes that Juan Diego Sera will only replace this obsession with worry about "something else (per Juan Diego Sera, her dog) if she sells the car  A: Elizabeth was not depressed during today's session  She is not aware of how much more stable she functions today than 7 years ago when she struggled with suicidality even though she was attending college courses  P:Elizabeth will continue to attend Individual and Group therapy on a weekly basis and encouraged to make short term goals / plans  Pain Scale and Suicide Risk [facility]: Current Pain Assessment: moderate to severe   On a scale of 0 to 10, the patient rates current pain at 3   Behavioral Health Treatment Plan [address] [place] [address] [address]: Diagnosis and Treatment Plan explained to patient, patient relates understanding diagnosis and is agreeable to Treatment Plan  Assessment    1  Bipolar I disorder, most recent episode depressed, moderate (296 52) (F31 32)   2  Eating disorder, unspecified (307 50) (F50 9)   3  EILEEN (generalized anxiety disorder) (300 02) (F41 1)   4  Obsessive-compulsive disorder (300 3) (F42)    Plan   1  Behavioral Health Service Flowsheet; Status:Unauthorized - Requires Signature;    Requested for:81Ufb5814;     Signatures   Electronically signed by : Felix Perez LCSW; Feb 4 2016  2:07PM EST                       (Author)

## 2018-01-17 NOTE — PSYCH
Progress Note  Psychotherapy Provided [facility]: Group Therapy provided today  Goals addressed in session:   1 D: Above individual attended Trauma Group  Members engaged in discussion about being present and self compassion  A: Elizabeth engaged in today's activities and supported other group members as they shared today  P: She will continue to be seen for individual and group therapy during which the above will be further processed  Pain Scale and Suicide Risk [facility]: Current Pain Assessment: no pain   Behavioral Health Treatment Plan [facility]: Diagnosis and Treatment Plan explained to patient, patient relates understanding diagnosis and is agreeable to Treatment Plan  Assessment    1   PTSD (post-traumatic stress disorder) (086 80) (F43 10)    Signatures   Electronically signed by : Trenton Fajardo LCSW; Nov 16 2017  9:48AM EST                       (Author)

## 2018-01-17 NOTE — MISCELLANEOUS
Message   Recorded as Task   Date: 03/22/2016 10:49 AM, Created By: Philip Minaya   Task Name: Medical Complaint Callback   Assigned To: Reina Rodriguez   Regarding Patient: Larisa Elam, Status: Active   Comment:    Claudine Honeycutt - 22 Mar 2016 10:49 AM     TASK CREATED  Pt called today-- not sleeping for more than two hours at a time, getting up throughout the night and engaging in projects  Also having thoughts about "how things work" --is wondering if having a manic episode, or if Prozac is too high  Has been feeling weird for past 1 1/2 weeks  Spoke with patient - has racing thoughts, anxiety and cannot sleep  Thinks it is due to increased Prozac dose, took only 40 mg today and feels slightly better  Plan: continue Prozac 40 mg for now If no further improvement advised patient to decrease Prozac dose to 20 mg daily  Also advised to start Melatonin 3 to 6 mg at bedtime        Signatures   Electronically signed by : PAUL Mcallister ; Mar 23 2016  5:41PM EST                       (Author)

## 2018-01-18 NOTE — PSYCH
Progress Note  Psychotherapy Provided [facility]: Group Therapy provided today  Goals addressed in session:   1 D: PT  was seen for Group Therapy session  Pts engaged in a therapeutic discussion and review of an article entitled âWhat I want My Daughter to Know about her Body  â   A: Elizabeth was open in sharing her thoughts, feelings and experiences as a daughter herself with the group re: the above  She also shared a letter she had written to her nephew whose mother restricts his food intake  P: Pt will be encouraged to continue to attend individual therapy weekly and the Adult ED group on a biweekly basis  Pain Scale and Suicide Risk [facility]: On a scale of 0 to 10, the patient rates current pain at 4   Current suicide risk is low   Behavioral Health Treatment Plan [facility]: Diagnosis and Treatment Plan explained to patient, patient relates understanding diagnosis and is agreeable to Treatment Plan  Assessment    1   Binge-eating disorder, severe (143 6) (S18 04)    Signatures   Electronically signed by : Jil Daniels LCSW; Jul 20 2017  2:46PM EST                       (Author)

## 2018-01-18 NOTE — RESULT NOTES
Verified Results  (1) LITHIUM [id number] 07:23AM Guzman Hardinb   TW Order Number: [id number]     Test Name Result Flag Reference   LITHIUM 0 6 mmol/L  0 5-1 0     (1) BASIC METABOLIC PROFILE [id number] 07:23AM Guzman Segal   TW Order Number: [id number]     Test Name Result Flag Reference   GLUCOSE,RANDM 98 mg/dL     If the patient is fasting, the ADA then defines impaired fasting glucose as > 100 mg/dL and diabetes as > or equal to 123 mg/dL  SODIUM 138 mmol/L  136-145   POTASSIUM 4 1 mmol/L  3 5-5 3   CHLORIDE 105 mmol/L  100-108   CARBON DIOXIDE 24 mmol/L  21-32   ANION GAP (CALC) 9 mmol/L  4-13   BLOOD UREA NITROGEN 10 mg/dL  5-25   CREATININE 0 81 mg/dL  0 60-1 30   Standardized to IDMS reference method   CALCIUM 9 3 mg/dL  8 3-10 1   eGFR Non-African American      >60 0 ml/min/1 73sq [facility] [employer] Disease Education Program recommendations are as follows:  GFR calculation is accurate only with a steady state creatinine  Chronic Kidney disease less than 60 ml/min/1 73 sq  meters  Kidney failure less than 15 ml/min/1 73 sq  meters

## 2018-01-18 NOTE — PSYCH
Progress Note  Psychotherapy Provided [facility]: Group Therapy provided today  Goals addressed in session:   1 D: Above individual attended Trauma Group  Pts engaged in a discussion re: the importance of self compassion  A: Elizabeth participated while also supporting her peers in doing so  She appeared to be reluctant to discuss other issues that were upsetting her, however  P: She will continue to be seen for individual and group therapy during which the above will be further processed  Pain Scale and Suicide Risk [facility]: Current Pain Assessment: moderate to severe   On a scale of 0 to 10, the patient rates current pain at 5   Current suicide risk is low   Behavioral Health Treatment Plan [facility]: Diagnosis and Treatment Plan explained to patient, patient relates understanding diagnosis and is agreeable to Treatment Plan  Assessment    1   EILEEN (generalized anxiety disorder) (300 02) (F41 1)    Signatures   Electronically signed by : Albert Figueredo LCSW; Oct  6 2017 11:57AM EST                       (Author)

## 2018-01-18 NOTE — PSYCH
Progress Note  Psychotherapy Provided [facility]: Individual Psychotherapy 50 minutes and Telephone Contact provided today  Goals addressed in session:   1: Pt was seen today for Individual Therapy session  Pt spoke about her feelings re: having been to the [employer] with friends during which time she spent only $30  She stated that she has felt "less and less suicidal, recognizing them as passing thoughts" and stated that she is looking forward to seeing her new dietician in upcoming weeks  A: Elizabeth has considerably increased her hope and positivity over the past 6 months  She does appear to believe that she could live a better life in the future and is looking forward to the future  P:Elizabeth will continue to attend Individual and Group therapy on a weekly basis  Pain Scale and Suicide Risk [facility]: Current Pain Assessment: moderate to severe   On a scale of 0 to 10, the patient rates current pain at 6   Behavioral Health Treatment Plan [address] [place] [address] [address]: Diagnosis and Treatment Plan explained to patient, patient relates understanding diagnosis and is agreeable to Treatment Plan  Assessment    1  Bipolar I disorder, most recent episode mixed, moderate (296 62) (F31 62)   2  Eating disorder, unspecified (307 50) (F50 9)   3   Obsessive-compulsive disorder (300 3) (F42)    Signatures   Electronically signed by : John Carter LCSW; May 12 2016  1:58PM EST                       (Author)

## 2018-01-18 NOTE — PSYCH
Progress Note  Psychotherapy Provided [facility]:   Goals addressed in session:   1 D: PT  was seen for Group Therapy session  PT engaged in discussion today welcoming a new member and supporting a struggling member  A: Elizabeth attempted to provide feedback, support re: her journey to a peer who is struggling, but became triggered when this peer insisted on remaining despondent and hopeless  P: Pt will continue to attend individual therapy once a week and the Adult ED group on a biweekly basis  Assessment    1   Eating disorder, unspecified (307 50) (F50 9)    Signatures   Electronically signed by : Robi Serrano LCSW; Jul 7 2016  9:56AM EST                       (Author)

## 2018-01-18 NOTE — PSYCH
Progress Note  Psychotherapy Provided [facility]: Group Therapy provided today  Goals addressed in session:   1 D: PT  was seen for Group Therapy session  PT engaged in activity today entitled, âWhat do you wantâ  Individual answers on each word of the title were reflected upon and then shared amongst group members  A: Elizabeth shared aloud for the first time with the group that she wants to become more independent  She did not minimize her ability to do so or speak sarcastically while stating this today  P: Pt will continue to attend individual therapy twice a month and the Adult ED group on a biweekly basis  Pain Scale and Suicide Risk [facility]: Current Pain Assessment: moderate to severe   On a scale of 0 to 10, the patient rates current pain at 4   Behavioral Health Treatment Plan [facility]: Diagnosis and Treatment Plan explained to patient, patient relates understanding diagnosis and is agreeable to Treatment Plan  Assessment    1   Eating disorder, unspecified (204 65) (R00 9)    Signatures   Electronically signed by : Domi Rogers LCSW; Jul 21 2016 12:40PM EST                       (Author)

## 2018-01-18 NOTE — PSYCH
Progress Note  Psychotherapy Provided [facility]: Group Therapy provided today  Goals addressed in session:   1 D: Pt attended Trauma Group  Pts engaged in an activity and discussion on the topic of forgiveness  Neuroplasticity of the brain was also discussed  A: Elizabeth spoke about her desire to forgive her mom  She provided limited sharing today and appeared somewhat distracted  P: Pt will continue to be seen for individual and group therapy during which the above will be further processed  Behavioral Health Treatment Plan [facility]: Diagnosis and Treatment Plan explained to patient, patient relates understanding diagnosis and is agreeable to Treatment Plan  Assessment    1   EILEEN (generalized anxiety disorder) (300 02) (F41 1)    Signatures   Electronically signed by : Crystal Damico LCSW; Nov 30 2016  9:52AM EST                       (Author)

## 2018-01-18 NOTE — PSYCH
Progress Note  Psychotherapy Provided [facility]: Group Therapy provided today  Goals addressed in session:   1 D: Above individual attended Trauma Group  Pts engaged in follow up activity re: decrease anxiety /trauma responses  A: Elizabeth actively participated in today's activity while supporting her peers while doing so  P: She will continue to be seen for individual and group therapy during which the above will be further processed  Pain Scale and Suicide Risk [facility]: On a scale of 0 to 10, the patient rates current pain at 5   Current suicide risk is low   Behavioral Health Treatment Plan [facility]: Diagnosis and Treatment Plan explained to patient, patient relates understanding diagnosis and is agreeable to Treatment Plan  Assessment    1   EILEEN (generalized anxiety disorder) (300 02) (F41 1)    Signatures   Electronically signed by : Albert Figueredo LCSW; Jul 27 2017  2:39PM EST                       (Author)

## 2018-01-18 NOTE — PSYCH
Progress Note  Psychotherapy Provided [facility]: Individual Psychotherapy 50 minutes provided today  Goals addressed in session:   1-3 D: Met with Elizabeth for Individual Therapy session  Elizabeth spoke more today about her feelings re: how all-consuming her OCD thoughts and rituals are  She verbalized that she recognizes she is in a manic state, and struggles with depression, but is most affected by the OCD struggles  A: Elizabeth presented as much less depressed today  She has continued to be reluctant to take anti-anxiety medications that would help her to sleep lately as doing so would interrupt her ability to complete her list of tasks  P: Upcoming sessions will be used to continue to address the above  She will continue to attend both weekly Trauma and ED group therapy sessions  Pain Scale and Suicide Risk [facility]: Current Pain Assessment: moderate to severe   On a scale of 0 to 10, the patient rates current pain at 5   Current suicide risk is moderate (see risk assessment checklist)   Behavioral Health Treatment Plan [facility]: Diagnosis and Treatment Plan explained to patient, patient relates understanding diagnosis and is agreeable to Treatment Plan  Assessment    1  Bipolar 1 disorder, depressed, moderate (296 52) (F31 32)   2  Binge-eating disorder, severe (783 6) (F50 81)   3  Obsessive-compulsive disorder (300 3) (F42 9)   4   EILEEN (generalized anxiety disorder) (300 02) (F41 1)    Signatures   Electronically signed by : Albert Figueredo LCSW; Jul 20 2017  1:00PM EST                       (Author)

## 2018-01-18 NOTE — PSYCH
Progress Note  Psychotherapy Provided [facility]: Individual Psychotherapy 50 minutes and Telephone Contact provided today  Goals addressed in session:   1: Pt was seen today for Individual Therapy session  Pt spoke about her feelings re: having increased her sleep over the past week  She reports no suicidality or depression and has not been gambling  A: Elizabeth has considerably increased her hope and positivity over the past 6 months  She does appear to believe that she could live a better life in the future and is looking into having her dog certified as a therapy partner  P:Elizabeth will continue to attend Individual and Group therapy on a weekly basis  Pain Scale and Suicide Risk [facility]: Current Pain Assessment: moderate to severe   On a scale of 0 to 10, the patient rates current pain at 6   Current suicide risk is low   Behavioral Health Treatment Plan [facility]: Diagnosis and Treatment Plan explained to patient, patient relates understanding diagnosis and is agreeable to Treatment Plan  Assessment    1  Bipolar I disorder, most recent episode mixed, moderate (296 62) (F31 62)   2  Eating disorder, unspecified (307 50) (F50 9)   3  Gambling disorder, moderate (312 31) (F63 0)   4   Obsessive-compulsive disorder (300 3) (F42)    Signatures   Electronically signed by : Lynn Mariscal LCSW; May  6 2016  1:06PM EST                       (Author)

## 2018-01-22 ENCOUNTER — TRANSCRIBE ORDERS (OUTPATIENT)
Dept: ADMINISTRATIVE | Age: 33
End: 2018-01-22

## 2018-01-22 ENCOUNTER — APPOINTMENT (OUTPATIENT)
Dept: PHYSICAL THERAPY | Age: 33
End: 2018-01-22
Payer: MEDICARE

## 2018-01-22 ENCOUNTER — APPOINTMENT (OUTPATIENT)
Dept: RADIOLOGY | Age: 33
End: 2018-01-22
Payer: MEDICARE

## 2018-01-22 VITALS
WEIGHT: 293 LBS | DIASTOLIC BLOOD PRESSURE: 82 MMHG | HEART RATE: 75 BPM | HEIGHT: 68 IN | BODY MASS INDEX: 44.41 KG/M2 | SYSTOLIC BLOOD PRESSURE: 128 MMHG

## 2018-01-22 VITALS
DIASTOLIC BLOOD PRESSURE: 70 MMHG | HEART RATE: 68 BPM | SYSTOLIC BLOOD PRESSURE: 120 MMHG | BODY MASS INDEX: 57.32 KG/M2 | HEIGHT: 68 IN

## 2018-01-22 VITALS
SYSTOLIC BLOOD PRESSURE: 132 MMHG | HEART RATE: 94 BPM | TEMPERATURE: 97.6 F | OXYGEN SATURATION: 98 % | WEIGHT: 293 LBS | HEIGHT: 68 IN | DIASTOLIC BLOOD PRESSURE: 84 MMHG | BODY MASS INDEX: 44.41 KG/M2

## 2018-01-22 VITALS — DIASTOLIC BLOOD PRESSURE: 70 MMHG | HEART RATE: 64 BPM | SYSTOLIC BLOOD PRESSURE: 116 MMHG

## 2018-01-22 VITALS — HEART RATE: 68 BPM | HEIGHT: 68 IN | DIASTOLIC BLOOD PRESSURE: 68 MMHG | SYSTOLIC BLOOD PRESSURE: 100 MMHG

## 2018-01-22 VITALS
SYSTOLIC BLOOD PRESSURE: 131 MMHG | DIASTOLIC BLOOD PRESSURE: 80 MMHG | HEIGHT: 68 IN | BODY MASS INDEX: 44.41 KG/M2 | WEIGHT: 293 LBS | HEART RATE: 60 BPM

## 2018-01-22 VITALS — DIASTOLIC BLOOD PRESSURE: 81 MMHG | HEART RATE: 69 BPM | HEIGHT: 68 IN | SYSTOLIC BLOOD PRESSURE: 129 MMHG

## 2018-01-22 DIAGNOSIS — M25.552 PAIN IN LEFT HIP: ICD-10-CM

## 2018-01-22 DIAGNOSIS — M54.50 LOW BACK PAIN: ICD-10-CM

## 2018-01-22 PROCEDURE — G8991 OTHER PT/OT GOAL STATUS: HCPCS

## 2018-01-22 PROCEDURE — 73502 X-RAY EXAM HIP UNI 2-3 VIEWS: CPT

## 2018-01-22 PROCEDURE — 97161 PT EVAL LOW COMPLEX 20 MIN: CPT

## 2018-01-22 PROCEDURE — G8990 OTHER PT/OT CURRENT STATUS: HCPCS

## 2018-01-23 ENCOUNTER — ALLSCRIPTS OFFICE VISIT (OUTPATIENT)
Dept: OTHER | Facility: OTHER | Age: 33
End: 2018-01-23

## 2018-01-23 VITALS
WEIGHT: 293 LBS | HEIGHT: 68 IN | BODY MASS INDEX: 44.41 KG/M2 | DIASTOLIC BLOOD PRESSURE: 72 MMHG | RESPIRATION RATE: 18 BRPM | HEART RATE: 74 BPM | OXYGEN SATURATION: 98 % | SYSTOLIC BLOOD PRESSURE: 140 MMHG | TEMPERATURE: 97.8 F

## 2018-01-23 NOTE — PSYCH
Progress Note  Psychotherapy Provided [facility]: Group Therapy provided today  Goals addressed in session:   1 D: Above individual attended Trauma Group  Members engaged in discussion about their struggles to manage the time they spend âfightingâ their negative thoughts  A: Elizabeth struggled with participating in today's discussion as she is overwhelmed with the current topic at the present time  P: She will continue to be seen for individual and group therapy during which the above will be further processed  Pain Scale and Suicide Risk [facility]: On a scale of 0 to 10, the patient rates current pain at 4   Behavioral Health Treatment Plan [facility]: Diagnosis and Treatment Plan explained to patient, patient relates understanding diagnosis and is agreeable to Treatment Plan  Assessment    1   PTSD (post-traumatic stress disorder) (309 88) (F43 10)    Signatures   Electronically signed by : Neel Knox LCSW; Jan 5 2018 11:56AM EST                       (Author)

## 2018-01-23 NOTE — PSYCH
Progress Note  Psychotherapy Provided [facility]: Individual Psychotherapy 50 minutes provided today  Goals addressed in session:   1-3 D: Met with Elizabeth for Individual Therapy session  Elizabeth spoke today about her feelings re: the experience of groinal response and associated feelings of shame  A: Elizabeth did not struggle to open up to this worker and did better as she learned how common such responses can be  P: Upcoming sessions will be used to continue to address the above  She will continue to attend both weekly Trauma and ED group therapy sessions  Pain Scale and Suicide Risk [facility]: Current Pain Assessment: moderate to severe   On a scale of 0 to 10, the patient rates current pain at 4   Behavioral Health Treatment Plan [facility]: Diagnosis and Treatment Plan explained to patient, patient relates understanding diagnosis and is agreeable to Treatment Plan  Assessment    1  EILEEN (generalized anxiety disorder) (300 02) (F41 1)   2  Binge-eating disorder, severe (783 6) (F50 81)   3  Obsessive-compulsive disorder (300 3) (F42 9)   4  PTSD (post-traumatic stress disorder) (309 81) (F43 10)   5   Severe bipolar I disorder, most recent episode depressed (296 53) (F31 4)    Signatures   Electronically signed by : Rand Thompson LCSW; Dec 13 2017  2:59PM EST                       (Author)

## 2018-01-23 NOTE — PSYCH
Progress Note  Psychotherapy Provided [facility]: Group Therapy provided today  Goals addressed in session:   1 D: Above individual attended Trauma Group  Members engaged in discussion about discomfort with their bodies  A: Elizabeth actively participated in today's discussion while also being extremely supportive of other group members re: their current struggles  P: She will continue to be seen for individual and group therapy during which the above will be further processed  Pain Scale and Suicide Risk [facility]: Current Pain Assessment: no pain   Behavioral Health Treatment Plan [facility]: Diagnosis and Treatment Plan explained to patient, patient relates understanding diagnosis and is agreeable to Treatment Plan  Assessment    1   PTSD (post-traumatic stress disorder) (086 28) (F43 10)    Signatures   Electronically signed by : Calli Garcia LCSW; Dec 14 2017 11:48AM EST                       (Author)

## 2018-01-23 NOTE — PSYCH
Progress Note  Psychotherapy Provided  [facility]: Individual Psychotherapy 50 minutes provided today  Goals addressed in session:   1-3 D: Met with Elizabeth for Individual Therapy session  Elizabeth spoke today about her feelings re:her ongoing struggles to sleep, her Nurses' agreement for her to d/c [facility] and unwillingness to start the [facility] as scheduled  A: Elizabeth acknowledged hopelessness, and admitted to having engaged in self injury this am  She was tearful, denied suicidality  P: This worker reinforced that [facility] is not high enough level of care at this time  She agreed to reconsider [facility]  Pain Scale and Suicide Risk [facility]: Current Pain Assessment: moderate to severe   On a scale of 0 to 10, the patient rates current pain at 4   Current suicide risk is moderate (see risk assessment checklist)   Behavioral Health Treatment Plan [facility]: Diagnosis and Treatment Plan explained to patient, patient relates understanding diagnosis and is agreeable to Treatment Plan  Assessment    1  Binge-eating disorder, severe (783 6) (F50 81)   2  EILEEN (generalized anxiety disorder) (300 02) (F41 1)   3  Gambling disorder, moderate (312 31) (F63 0)   4  Obsessive-compulsive disorder (300 3) (F42 9)   5  PTSD (post-traumatic stress disorder) (309 81) (F43 10)   6   Severe bipolar I disorder, most recent episode depressed (296 53) (F31 4)    Signatures   Electronically signed by : Cathy Roca LCSW; Jan 8 2018  2:34PM EST                       (Author)

## 2018-01-23 NOTE — PSYCH
Progress Note  Psychotherapy Provided [facility]: Family Therapy provided today  Goals addressed in session:   1-3 D: Met with Elizabeth and her father for a Family therapy session  Elizabeth spoke today about her feelings re: the lack of support she receives from her mother and the progress she has made in her life since moving out several months ago  Her father responded that he has been limiting his financial support to her as a form of "tough love" but stated that "she knows that he will always be there for her if she needs him "   A: Elizabeth appeared to be frustrated at her father's lack of clarity while also uncertain to whether he was agreeing to help her  She also acknowledged that she "knows she asks for a lot "   P: Upcoming sessions will be used to continue to address the above  She will continue to attend both weekly Trauma and ED group therapy sessions  Pain Scale and Suicide Risk [facility]: Current Pain Assessment: moderate to severe   On a scale of 0 to 10, the patient rates current pain at 4   Current suicide risk is moderate (see risk assessment checklist)   Behavioral Health Treatment Plan [facility]: Diagnosis and Treatment Plan explained to patient, patient relates understanding diagnosis and is agreeable to Treatment Plan  Assessment    1  EILEEN (generalized anxiety disorder) (300 02) (F41 1)   2  Gambling disorder, moderate (312 31) (F63 0)   3  Obsessive-compulsive disorder (300 3) (F42 9)   4  PTSD (post-traumatic stress disorder) (309 81) (F43 10)   5   Binge-eating disorder, severe (783 6) (F50 81)    Signatures   Electronically signed by : Nael Elena LCSW; Dec 28 2017 12:37PM EST                       (Author)

## 2018-01-23 NOTE — PSYCH
Message  Message Free Text Note Form: returned call - wanted earlier appt- no  keep for fri- told her that she has to comply with the recom for php in order to remain a pt here  ok  wants a peer specialist  has the packet!!!!      Active Problems    1  Abnormal weight gain (783 1) (R63 5)   2  Allergic rhinitis (477 9) (J30 9)   3  Amenorrhea (626 0) (N91 2)   4  Binge-eating disorder, severe (783 6) (F50 81)   5  Bulging lumbar disc (722 10) (M51 26)   6  Dysuria (788 1) (R30 0)   7  Esophageal reflux (530 81) (K21 9)   8  Fracture of left toe with routine healing (V54 19) (S92 912D)   9  EILEEN (generalized anxiety disorder) (300 02) (F41 1)   10  Gambling disorder, moderate (312 31) (F63 0)   11  Hypertension (401 9) (I10)   12  Hypothyroidism (244 9) (E03 9)   13  Left foot infection (686 9) (L08 9)   14  Left hip pain (719 45) (M25 552)   15  Long term current use of therapeutic drug (V58 69) (Z79 899)   16  Lumbago (724 2) (M54 5)   17  Lumbar degenerative disc disease (722 52) (M51 36)   18  Morbid or severe obesity due to excess calories (278 01) (E66 01)   19  Need for immunization against influenza (V04 81) (Z23)   20  Nocturnal enuresis (788 36) (N39 44)   21  Obsessive-compulsive disorder (300 3) (F42 9)   22  Other abnormal glucose (790 29) (R73 09)   23  Overactive bladder (596 51) (N32 81)   24  Pains, foot (729 5) (M79 673)   25  PTSD (post-traumatic stress disorder) (309 81) (F43 10)   26  RLS (restless legs syndrome) (333 94) (G25 81)   27  Severe bipolar I disorder, most recent episode depressed (296 53) (F31 4)   28  Urgency incontinence (788 31) (N39 41)   29  Urinary incontinence, unspecified type (788 30) (R32)   30  Urinary tract infection, site unspecified (599 0) (N39 0)   31  Vitamin D deficiency (268 9) (E55 9)    Current Meds   1  Ativan 1 MG Oral Tablet (LORazepam); Therapy: 63Gki7313 to Recorded   2  LaMICtal 150 MG Oral Tablet (LamoTRIgine); 2 tabs daily;    Therapy: 04JZX2040 to Requested for: 10DAU5202 Recorded   3  Latuda 120 MG Oral Tablet; TAKE 1 TABLET IN THE EVENING WITH FOOD; Therapy: 36Kbx3590 to (03 [phone] 74 30 53)  Requested for: 39SYH8744; Last   Rx:90Rcx1909 Ordered   4  Levocetirizine Dihydrochloride 5 MG Oral Tablet; TAKE 1 TABLET BY MOUTH ONCE A   DAY; Therapy: 63Tpy0661 to (Evaluate:15Jan2018)  Requested for: 20OZC3344; Last   Rx:89Ojt0320 Ordered   5  Levothyroxine Sodium 100 MCG Oral Tablet (Synthroid); take 1 tablet by mouth every day; Therapy: 83YFZ3056 to (Elane Ramus)  Requested for: 05Wjo1900; Last   Rx:25Dbr4703 Ordered   6  Lisinopril 10 MG Oral Tablet; TAKE 1 TABLET BY MOUTH EVERY DAY FOR BLOOD   PRESSURE; Therapy: 00IFA3645 to [phone])  Requested for: 05Qfm1883; Last   Rx:97Sem7184 Ordered   7  Lithium Carbonate  MG Oral Tablet Extended Release; take 2 tablets at bedtime; Therapy: 95XTS1849 to (Evaluate:09Jan2018)  Requested for: 46Iwa0429; Last   Rx:28Vki4653 Ordered   8  Myrbetriq 50 MG Oral Tablet Extended Release 24 Hour; Take one tablet daily; Therapy: 56XOM9691 to (Last Rx:19Ogm5778)  Requested for: 73Guv9077 Ordered   9  Neurontin 600 MG Oral Tablet (Gabapentin); TAKE1 5 TABS DAILY; Therapy: (Recorded:30Oct2017) to Recorded   10  Omeprazole 20 MG Oral Capsule Delayed Release; take 1 capsule daily as needed; Therapy: 52KSD9632 to (Nikitresa Merino)  Requested for: 76Yct2565; Last    Rx:76Azq8478 Ordered   11  ROPINIRole HCl - 3 MG Oral Tablet; take 1 tablet by mouth twice a day; Therapy: 97Zcz8166 to (Evaluate:25Apr2018)  Requested for: 84Jee2468; Last    Rx:74Ktz3523 Ordered   12  Sertraline HCl - 100 MG Oral Tablet; TAKE 2 TABLET Every morning; Therapy: (Recorded:30Oct2017) to Recorded   13  Vicodin 5-300 MG Oral Tablet; BID PRN pain; Therapy: 72MUQ6301 to Recorded    Allergies    1  No Known Drug Allergies    2   Seasonal    Signatures   Electronically signed by : Lynn Mariscal LCSW; Ricky 15 2018 11: 24AM EST                       (Author)

## 2018-01-23 NOTE — PSYCH
Progress Note  Psychotherapy Provided [facility]: Group Therapy provided today  Goals addressed in session:   1 D: Above individual attended Trauma Group  Members engaged in discussion and activity related to self compassion  A: Elizabeth engaged in today's activities while sharing and supporting other group members as they shared today  P: She will continue to be seen for individual and group therapy during which the above will be further processed  Pain Scale and Suicide Risk [facility]: Current Pain Assessment: no pain   Behavioral Health Treatment Plan [facility]: Diagnosis and Treatment Plan explained to patient, patient relates understanding diagnosis and is agreeable to Treatment Plan  Assessment    1   PTSD (post-traumatic stress disorder) (286 03) (F43 10)    Signatures   Electronically signed by : Jarrod Sanchez LCSW; Dec  1 2017 10:18AM EST                       (Author)

## 2018-01-23 NOTE — PSYCH
Progress Note  Psychotherapy Provided [facility]: Individual Psychotherapy 50 minutes provided today  Goals addressed in session:   1-3 D: Met with Elizabeth for Individual Therapy session  Elizabeth spoke today about her feelings re: last week's family session with her father and the lies she felt he told  She verbalized her struggles to sleep, her increased depression since starting [facility] and agreed she needs to start the [facility] at this time  A: Elizabeth acknowledged hopelessness, but denied suicidal intent or a plan  She continues to struggle with ocd thoughts  P: Upcoming sessions will be used to continue to address the above  She will continue to attend both weekly Trauma and ED group therapy sessions  She agreed to start [facility] asap  Pain Scale and Suicide Risk [facility]: Current Pain Assessment: moderate to severe   On a scale of 0 to 10, the patient rates current pain at 4   Current suicide risk is moderate (see risk assessment checklist)   Behavioral Health Treatment Plan [facility]: Diagnosis and Treatment Plan explained to patient, patient relates understanding diagnosis and is agreeable to Treatment Plan  Assessment    1  Binge-eating disorder, severe (783 6) (F50 81)   2  EILEEN (generalized anxiety disorder) (300 02) (F41 1)   3  Gambling disorder, moderate (312 31) (F63 0)   4  Obsessive-compulsive disorder (300 3) (F42 9)   5   PTSD (post-traumatic stress disorder) (309 81) (F43 10)    Signatures   Electronically signed by : Clotilde Gold LCSW; Jan 4 2018  9:17AM EST                       (Author)

## 2018-01-23 NOTE — PSYCH
Progress Note  Psychotherapy Provided [facility]: Group Therapy provided today  Goals addressed in session:   1 D: The above was seen for Group Therapy session  Group members engaged in a discussion re: self care and healthy eating  A: Elizabeth was open in sharing her recent struggles with group members today  P: She will be encouraged to continue to attend individual therapy weekly and the Adult ED group on a biweekly basis  Behavioral Health Treatment Plan [facility]: Diagnosis and Treatment Plan explained to patient, patient relates understanding diagnosis and is agreeable to Treatment Plan  Assessment    1   Binge-eating disorder, severe (223 6) (X43 48)    Signatures   Electronically signed by : Rand Thompson LCSW; Dec  8 2017  4:04PM EST                       (Author)

## 2018-01-23 NOTE — PSYCH
Message  Message Free Text Note Form: lm for [employer] that aftercare recommendation for darshan is php  please cb to discuss further  Active Problems    1  Abnormal weight gain (783 1) (R63 5)   2  Allergic rhinitis (477 9) (J30 9)   3  Amenorrhea (626 0) (N91 2)   4  Binge-eating disorder, severe (783 6) (F50 81)   5  Bulging lumbar disc (722 10) (M51 26)   6  Dysuria (788 1) (R30 0)   7  Esophageal reflux (530 81) (K21 9)   8  Foot pain (729 5) (M79 673)   9  Fracture of left toe with routine healing (V54 19) (S92 912D)   10  EILEEN (generalized anxiety disorder) (300 02) (F41 1)   11  Gambling disorder, moderate (312 31) (F63 0)   12  Hypertension (401 9) (I10)   13  Hypothyroidism (244 9) (E03 9)   14  Left foot infection (686 9) (L08 9)   15  Long term current use of therapeutic drug (V58 69) (Z79 899)   16  Lumbago (724 2) (M54 5)   17  Lumbar degenerative disc disease (722 52) (M51 36)   18  Morbid or severe obesity due to excess calories (278 01) (E66 01)   19  Need for immunization against influenza (V04 81) (Z23)   20  Nocturnal enuresis (788 36) (N39 44)   21  Obsessive-compulsive disorder (300 3) (F42 9)   22  Other abnormal glucose (790 29) (R73 09)   23  Overactive bladder (596 51) (N32 81)   24  PTSD (post-traumatic stress disorder) (309 81) (F43 10)   25  RLS (restless legs syndrome) (333 94) (G25 81)   26  Severe bipolar I disorder, most recent episode depressed (296 53) (F31 4)   27  Urgency incontinence (788 31) (N39 41)   28  Urinary incontinence, unspecified type (788 30) (R32)   29  Urinary tract infection, site unspecified (599 0) (N39 0)   30  Vitamin D deficiency (268 9) (E55 9)    Current Meds   1  HydrOXYzine HCl - 50 MG Oral Tablet; TAKE 1 1/2 TO 2 TABLETS AT BEDTIME AS   NEEDED FOR INSOMNIA; Therapy: 18WKR9938 to (Evaluate:65Tti5223)  Requested for: 90IKD8048; Last   Rx:13Jun2017 Ordered   2  LamoTRIgine 200 MG Oral Tablet; TAKE 2 TABLETS AT BEDTIME;    Therapy: 50FYV8293 to (NPKJJBPK:79DOD7289)  Requested for: 15Err5111; Last   Rx:67Hzi7068 Ordered   3  Latuda 120 MG Oral Tablet; TAKE 1 TABLET IN THE EVENING WITH FOOD; Therapy: 21Cto1508 to (21 )  Requested for: 80EZF9640; Last   Rx:79Sjm4770 Ordered   4  Levocetirizine Dihydrochloride 5 MG Oral Tablet; TAKE 1 TABLET BY MOUTH ONCE A   DAY; Therapy: 41Uak7253 to (Evaluate:15Jan2018)  Requested for: 35FJV2640; Last   Rx:16Nov2017 Ordered   5  Levothyroxine Sodium 100 MCG Oral Tablet (Synthroid); take 1 tablet by mouth every day; Therapy: 46RCM8185 to (Carnella Halsted)  Requested for: 88Goy6337; Last   Rx:44Rni0609 Ordered   6  Lisinopril 10 MG Oral Tablet; TAKE 1 TABLET BY MOUTH EVERY DAY FOR BLOOD   PRESSURE; Therapy: 37FIL9330 to [id number])  Requested for: 97Zbs2005; Last   Rx:40Zfx8449 Ordered   7  Lithium Carbonate  MG Oral Tablet Extended Release; take 2 tablets at bedtime; Therapy: 12ZFB0969 to (Evaluate:09Jan2018)  Requested for: 19Uix2608; Last   Rx:50Cur2814 Ordered   8  LORazepam 0 5 MG Oral Tablet; Take 1 tablet 3 times daily  as needed; Therapy: 51Evu2814 to (Evaluate:10Nov2017); Last Rx:64Zcj6287 Ordered   9  Myrbetriq 50 MG Oral Tablet Extended Release 24 Hour; Take one tablet daily; Therapy: 28YFT6779 to (Last Rx:80Pxq6304)  Requested for: 51Xwl8151 Ordered   10  Neurontin 600 MG Oral Tablet (Gabapentin); TAKE1 5 TABS DAILY; Therapy: (Recorded:30Oct2017) to Recorded   11  Omeprazole 20 MG Oral Capsule Delayed Release; take 1 capsule daily as needed; Therapy: 45IYG4127 to (Ruth Ann Cha)  Requested for: 69LXQ8594; Last    Rx:54Vgu6810 Ordered   12  ROPINIRole HCl - 3 MG Oral Tablet; take 1 tablet by mouth twice a day; Therapy: 30Wir7279 to (Evaluate:25Apr2018)  Requested for: 66Stt7735; Last    Rx:01Hca7771 Ordered   13  Sertraline HCl - 100 MG Oral Tablet; TAKE 2 TABLET Every morning; Therapy: (Recorded:30Oct2017) to Recorded    Allergies    1   No Known Drug Allergies    2   Seasonal    Signatures   Electronically signed by : Cathy Roca LCSW; Ricky 10 2018  4:14PM EST                       (Author)

## 2018-01-23 NOTE — PSYCH
Progress Note  Psychotherapy Provided [facility]:   Goals addressed in session:   1 D: The above was seen for Group Therapy session  Group members engaged in a discussion re: their Eating Disorder and their ongoing struggles to manage their symptoms  A: Elizabeth was open in sharing her recent struggles with group members today  P: She will be encouraged to continue to attend individual therapy weekly and the Adult ED group on a biweekly basis  Behavioral Health Treatment Plan [facility]: Diagnosis and Treatment Plan explained to patient, patient relates understanding diagnosis and is agreeable to Treatment Plan  Assessment    1   Binge-eating disorder, severe (783 6) (V84 71)    Signatures   Electronically signed by : John Carter LCSW; Jan 19 2018 10:48AM EST                       (Author)

## 2018-01-23 NOTE — PSYCH
Progress Note  Psychotherapy Provided [facility]: Individual Psychotherapy 50 minutes provided today  Goals addressed in session:   1-3 D: Met with Elizabeth for Individual Therapy session  Elizabeth spoke today about her feelings re: the lack of support she receives from her parents and the ocd thoughts she has been struggling with  A: Elizabeth again struggled to open up to this worker but responded well as support was offered  P: Upcoming sessions will be used to continue to address the above  She will continue to attend both weekly Trauma and ED group therapy sessions  She agreed to speak with her Psychiatrist re: the ocd-related thoughts  Pain Scale and Suicide Risk [facility]: Current Pain Assessment: moderate to severe   On a scale of 0 to 10, the patient rates current pain at 4   Current suicide risk is moderate (see risk assessment checklist)   Behavioral Health Treatment Plan [address] [place] [address] [address]: Diagnosis and Treatment Plan explained to patient, patient relates understanding diagnosis and is agreeable to Treatment Plan  Assessment    1  Binge-eating disorder, severe (783 6) (F50 81)   2  EILEEN (generalized anxiety disorder) (300 02) (F41 1)   3  Gambling disorder, moderate (312 31) (F63 0)   4  Obsessive-compulsive disorder (300 3) (F42 9)   5   PTSD (post-traumatic stress disorder) (309 81) (F43 10)    Signatures   Electronically signed by : Fiorella Cardoza LCSW; Dec 19 2017  2:00PM EST                       (Author)

## 2018-01-23 NOTE — PSYCH
Progress Note  Psychotherapy Provided [facility]: Individual Psychotherapy 50 minutes provided today  Goals addressed in session:   1-3 D: Met with Elizabeth for Individual Therapy session  Elizabeth spoke today about her feelings re: her childhood trauma and shame associated with self care and spending  A: Elizabeth again struggled to open up to this worker about her feelings today as she felt ashamed and overwhelmed  With support and prompting, she was able to begin to disclose feeling surrounding the above that are upsetting her  P: Upcoming sessions will be used to continue to address the above  She will continue to attend both weekly Trauma and ED group therapy sessions  Pain Scale and Suicide Risk [facility]: Current Pain Assessment: moderate to severe   On a scale of 0 to 10, the patient rates current pain at 7   Current suicide risk is low   Behavioral Health Treatment Plan [facility]: Diagnosis and Treatment Plan explained to patient, patient relates understanding diagnosis and is agreeable to Treatment Plan  Results/Data  PHQ-9 Adult Depression Screening [id number] 10:56AM Trish Zepedainz     Test Name Result Flag Reference   PHQ-9 Adult Depression Score 13     Over the last two weeks, how often have you been bothered by any of the following problems? Little interest or pleasure in doing things: Several days - 1  Feeling down, depressed, or hopeless: Several days - 1  Trouble falling or staying asleep, or sleeping too much: Several days - 1  Feeling tired or having little energy: More than half the days - 2  Poor appetite or over eating: More than half the days - 2  Feeling bad about yourself - or that you are a failure or have let yourself or your family down: More than half the days - 2  Trouble concentrating on things, such as reading the newspaper or watching television: More than half the days - 2  Moving or speaking so slowly that other people could have noticed   Or the opposite -  being so fidgety or restless that you have been moving around a lot more than usual: More than half the days - 2  Thoughts that you would be better off dead, or of hurting yourself in some way: Not at all - 0   PHQ-9 Adult Depression Screening Positive     PHQ-9 Difficulty Level Somewhat difficult     PHQ-9 Severity Moderate Depression         Assessment    1  Gambling disorder, moderate (312 31) (F63 0)   2  EILEEN (generalized anxiety disorder) (300 02) (F41 1)   3  Obsessive-compulsive disorder (300 3) (F42 9)   4  PTSD (post-traumatic stress disorder) (309 81) (F43 10)   5   Severe bipolar I disorder, most recent episode depressed (296 53) (F31 4)    Signatures   Electronically signed by : Tawanna Padilla LCSW; Dec  8 2017  1:03PM EST                       (Author)
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
## 2018-01-24 ENCOUNTER — TRANSCRIBE ORDERS (OUTPATIENT)
Dept: ADMINISTRATIVE | Facility: HOSPITAL | Age: 33
End: 2018-01-24

## 2018-01-24 ENCOUNTER — OFFICE VISIT (OUTPATIENT)
Dept: PSYCHIATRY | Facility: CLINIC | Age: 33
End: 2018-01-24
Payer: MEDICARE

## 2018-01-24 ENCOUNTER — OFFICE VISIT (OUTPATIENT)
Dept: PSYCHOLOGY | Facility: CLINIC | Age: 33
End: 2018-01-24
Payer: MEDICARE

## 2018-01-24 ENCOUNTER — DOCUMENTATION (OUTPATIENT)
Dept: PSYCHOLOGY | Facility: CLINIC | Age: 33
End: 2018-01-24

## 2018-01-24 ENCOUNTER — DOCUMENTATION (OUTPATIENT)
Dept: PSYCHIATRY | Facility: CLINIC | Age: 33
End: 2018-01-24

## 2018-01-24 ENCOUNTER — APPOINTMENT (OUTPATIENT)
Dept: LAB | Facility: MEDICAL CENTER | Age: 33
End: 2018-01-24
Payer: MEDICARE

## 2018-01-24 VITALS
TEMPERATURE: 99.1 F | WEIGHT: 293 LBS | DIASTOLIC BLOOD PRESSURE: 71 MMHG | RESPIRATION RATE: 20 BRPM | HEIGHT: 67 IN | HEART RATE: 74 BPM | SYSTOLIC BLOOD PRESSURE: 114 MMHG | BODY MASS INDEX: 45.99 KG/M2

## 2018-01-24 VITALS
HEART RATE: 66 BPM | HEIGHT: 67 IN | OXYGEN SATURATION: 98 % | RESPIRATION RATE: 16 BRPM | WEIGHT: 293 LBS | DIASTOLIC BLOOD PRESSURE: 72 MMHG | SYSTOLIC BLOOD PRESSURE: 128 MMHG | BODY MASS INDEX: 45.99 KG/M2

## 2018-01-24 VITALS — DIASTOLIC BLOOD PRESSURE: 70 MMHG | SYSTOLIC BLOOD PRESSURE: 110 MMHG | HEART RATE: 60 BPM

## 2018-01-24 DIAGNOSIS — F31.4 BIPOLAR I DISORDER, MOST RECENT EPISODE DEPRESSED, SEVERE WITHOUT PSYCHOTIC FEATURES (HCC): Primary | ICD-10-CM

## 2018-01-24 DIAGNOSIS — F42.9 OBSESSIVE-COMPULSIVE DISORDER, UNSPECIFIED TYPE: ICD-10-CM

## 2018-01-24 DIAGNOSIS — F31.4 BIPOLAR 1 DISORDER, DEPRESSED, SEVERE (HCC): ICD-10-CM

## 2018-01-24 PROBLEM — N32.81 OVERACTIVE BLADDER: Status: ACTIVE | Noted: 2018-01-24

## 2018-01-24 PROBLEM — I10 HYPERTENSION: Chronic | Status: ACTIVE | Noted: 2018-01-24

## 2018-01-24 PROBLEM — E03.9 HYPOTHYROIDISM: Status: ACTIVE | Noted: 2018-01-24

## 2018-01-24 PROBLEM — J30.2 SEASONAL ALLERGIES: Status: ACTIVE | Noted: 2018-01-24

## 2018-01-24 LAB
ERYTHROCYTE [DISTWIDTH] IN BLOOD BY AUTOMATED COUNT: 14.3 % (ref 11.6–15.1)
HCT VFR BLD AUTO: 41.4 % (ref 34.8–46.1)
HGB BLD-MCNC: 13.1 G/DL (ref 11.5–15.4)
MCH RBC QN AUTO: 27.7 PG (ref 26.8–34.3)
MCHC RBC AUTO-ENTMCNC: 31.6 G/DL (ref 31.4–37.4)
MCV RBC AUTO: 88 FL (ref 82–98)
PLATELET # BLD AUTO: 384 THOUSANDS/UL (ref 149–390)
PMV BLD AUTO: 10.3 FL (ref 8.9–12.7)
RBC # BLD AUTO: 4.73 MILLION/UL (ref 3.81–5.12)
WBC # BLD AUTO: 13.4 THOUSAND/UL (ref 4.31–10.16)

## 2018-01-24 PROCEDURE — 36415 COLL VENOUS BLD VENIPUNCTURE: CPT

## 2018-01-24 PROCEDURE — 85027 COMPLETE CBC AUTOMATED: CPT

## 2018-01-24 PROCEDURE — 99999 PR OFFICE/OUTPT VISIT,PROCEDURE ONLY: CPT | Performed by: PSYCHIATRY & NEUROLOGY

## 2018-01-24 PROCEDURE — G0410 GRP PSYCH PARTIAL HOSP 45-50: HCPCS

## 2018-01-24 PROCEDURE — 90791 PSYCH DIAGNOSTIC EVALUATION: CPT

## 2018-01-24 PROCEDURE — G0176 OPPS/PHP;ACTIVITY THERAPY: HCPCS

## 2018-01-24 PROCEDURE — G0177 OPPS/PHP; TRAIN & EDUC SERV: HCPCS

## 2018-01-24 PROCEDURE — 90792 PSYCH DIAG EVAL W/MED SRVCS: CPT | Performed by: PSYCHIATRY & NEUROLOGY

## 2018-01-24 RX ORDER — MULTIVITAMIN
1 CAPSULE ORAL DAILY
COMMUNITY

## 2018-01-24 RX ORDER — LITHIUM CARBONATE 450 MG
900 TABLET, EXTENDED RELEASE ORAL
COMMUNITY
End: 2018-08-02 | Stop reason: ALTCHOICE

## 2018-01-24 RX ORDER — HYDROCODONE BITARTRATE AND ACETAMINOPHEN 5; 325 MG/1; MG/1
1 TABLET ORAL 2 TIMES DAILY PRN
COMMUNITY
End: 2020-12-07

## 2018-01-24 RX ORDER — OXYBUTYNIN CHLORIDE 10 MG/1
10 TABLET, EXTENDED RELEASE ORAL DAILY
COMMUNITY
End: 2018-03-11 | Stop reason: SDUPTHER

## 2018-01-24 RX ORDER — CLOMIPRAMINE HYDROCHLORIDE 25 MG/1
100 CAPSULE ORAL EVERY OTHER DAY
COMMUNITY
End: 2018-03-16 | Stop reason: SDUPTHER

## 2018-01-24 RX ORDER — LORAZEPAM 1 MG/1
1 TABLET ORAL 3 TIMES DAILY PRN
COMMUNITY

## 2018-01-24 RX ORDER — MAG HYDROX/ALUMINUM HYD/SIMETH 400-400-40
5000 SUSPENSION, ORAL (FINAL DOSE FORM) ORAL DAILY
COMMUNITY

## 2018-01-24 RX ORDER — GABAPENTIN 600 MG/1
900 TABLET ORAL
COMMUNITY
End: 2018-06-06 | Stop reason: SDUPTHER
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
## 2018-01-24 NOTE — PSYCH
Subjective:     Patient ID: Anushka Ron is a 34 y o  female  [facility] Clinical Progress Notes     Specialized Services Documentation  Therapist must complete separate progress note for each specific clinical activity in which the individual participated during the day  Group Psychotherapy    [id number] [facility] was excused from wellness group today due to initial evaluation with CM Lasalle Oppenheim, RN

## 2018-01-24 NOTE — PSYCH
Subjective:     Patient ID: Meri Wong is a 34 y o  female  [facility] Clinical Progress Notes     Specialized Services Documentation  Therapist must complete separate progress note for each specific clinical activity in which the individual participated during the day  Group Psychotherapy [phone]  Angelika Frausto was excused from psychotherapy group for initial eval with psychiatrist  Therapist:  Lisa MOSS      Case Management Note    Lisa MOSS    Current suicide risk : Low     [id number]  This CM met with Angelika Frausto for initial assessment  ROIs signed for PCP, emergency contact, outpatient psychiatrist and therapist   Program basics explained, and on-call/crisis numbers provided  Denies Si, HI and psychosis today  Lithium discontinued by Dr Jesse Verdugo today  Medications changes/added/denied? yes    Treatment session number: 1    Individual Case Management Visit not provided today?  No

## 2018-01-24 NOTE — PSYCH
Assessment/Plan:      Diagnoses and all orders for this visit:    Bipolar I disorder, most recent episode depressed, severe without psychotic features ([facility] [address] )    Obsessive-compulsive disorder, unspecified type    Other orders  -     gabapentin (NEURONTIN) 600 MG tablet; Take 900 mg by mouth daily at bedtime  -     lithium carbonate (LITHOBID) 450 mg CR tablet; Take 900 mg by mouth daily at bedtime  -     LORazepam (ATIVAN) 1 mg tablet; Take 1 mg by mouth 3 (three) times a day as needed for anxiety  -     HYDROcodone-acetaminophen (NORCO) 5-325 mg per tablet; Take 1 tablet by mouth 2 (two) times a day as needed for pain (severe pain)  -     oxybutynin (DITROPAN-XL) 10 MG 24 hr tablet; Take 10 mg by mouth daily  -     clomiPRAMINE (ANAFRANIL) 25 mg capsule; Take 25 mg by mouth every other day  -     Cholecalciferol (VITAMIN D3) 5000 units CAPS; Take 5,000 Units by mouth daily  -     Multiple Vitamin (MULTIVITAMIN) capsule; Take 1 capsule by mouth daily          Subjective:     Patient ID: Meri Wong is a 34 y o  female  [facility] Treatment Plan   AREAS OF NEED: Depression and anxiety as evidenced by excessive worry, obsessive thoughts, anhedonia, low mood, poor concentration, suicidal thoughts without a plan or intent  Date Initiated: 1/24/2018    Strengths: Compliant with treatment and very motivated to find a solution to help her improve; compassionate; loves her dog    LONG TERM GOAL: 1 0  I will identify three signs that my anxiety and depression have improved and that I feel like my life feels more manageable  Date Initiated: 1/24/2018  Target Date: 2/21/2018    Completion Date:     SHORT TERM OBJECTIVES:   1 1  I will identify at least two relaxation or mindfulness techniques that I learn in the program that help reduce my symptoms of anxiety, and will practice at least one daily    Date Initiated: 1/24/2018  Revision Date:  Target Date: 2/2/2018  Completion Date:     1 2  I will begin to look into activities that I can do outside of program, such as volunteer work or a hobby, that can provide some structure into my day and help me feel more productive  Date Initiated: 1/24/2018  Revision Date:  Target Date: 2/2/2018  Completion Date:    1 3 I will take my medications as prescribed, and if I have any questions or concerns, I will address them with program staff  Date Initiated: 1/24/2018  Revision Date:   Target Date: 2/2/2018  Completion Date:     1 4 I will identify two positive supports in my life, and at least two ways that my supports can help me with my recovery  Date Initiated: 1/24/2018  Revision Date:  Target Date: 2/2/2018  Completion Date:         7 DAY REVISION:    Date Initiated:  Revision Date:   Target Date:   Completion Date:    Date Initiated:  Revision Date:       Target Date:      Completion Date:       Date Initiated:    Revision Date:    Target Date:         Completion Date:      Date Initiated:    Revision Date:   Target Date:        Completion Date:     PSYCHIATRY:  Medication management and education  Date Initiated: 1/24/2018       Revision Date:   The person(s) responsible for carrying out the plan is Ele Zaidi MD    NURSING:   Provide wellness/education group 5 times weekly to teach Kayleen Fung about the signs and symptoms of her diagnoses, wellness tools, and medications  Date Initiated: 1/24/2018  Revision Date:  The person(s) responsible for carrying out the plan is Grant Francisco RN    PSYCHOLOGY:        1 1, 1 2, 1 4  Provide psychotherapy group 5 times per week to allow opportunity for Kayleen Fung to explore stressors and ways of               coping     Date Initiated: 1/24/2018  Revision Date:   The person(s) responsible for carrying out the plan is ISA Douglas [facility]    ALLIED THERAPY:   1 1, 1 2 Engage Sabrina Lerner in AT group 5 times daily to encourage development and use of wellness tools to decrease symptoms and promote recovery through meaningful activity  Date Initiated: 1/24/2018  Revision Date:   The person(s) responsible for carrying out the plan is MORIAH Ching    CASE MANAGEMENT:   1 0  This  will meet with Marty Mcgarry 3-4 times weekly to assess treatment progress, discharge planning, connection to community supports and UR as indicated  Date Initiated: 1/24/2018  Revision Date:   The person(s) responsible for carrying out the plan is ISA Quach      TREATMENT REVIEW/COMMENTS:     DISCHARGE CRITERIA: Identify three signs of improvement and complete relapse prevention plan  DISCHARGE PLAN: Return to outpatient providers  Estimated Length of Stay: 10 treatment days    Diagnosis and Treatment Plan explained to Elizabeth Alcaraz relates understanding diagnosis and is agreeable to Treatment Plan             CLIENT COMMENTS / Please share your thoughts, feelings, need and/or experiences regarding your treatment plan: _____________________________________________________________________________________________________________________________________________________________________________________________________________________________________________________________________________________________________________________ Date/Time: ______________     Patient Signature: _________________________________     Date/Time: ______________      Signature: _________________________________     Date/Time: ______________
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
## 2018-01-24 NOTE — PSYCH
Subjective:     Patient ID: Mu Sorto is a 34 y o  female  [facility] Clinical Progress Notes     Specialized Services Documentation  Therapist must complete separate progress note for each specific clinical activity in which the individual participated during the day         Education Therapy   Time:  6272-9620  Previous goal met: First treatment day  Readiness to Learning: Receptive  Barriers to Leaning: None  Learning Assessment  Time: 3104-9112  Education Completed: Illness, Medication and Wellness Tools  Teaching Method: Verbal  Shared Area of Learning: Yes   Goal Set: do an activity she enjoys - craft, time with dog  Treatment Plan Objective: 1 1  Jayden Blair MT-BC

## 2018-01-24 NOTE — PSYCH
Subjective:     Patient ID: Erendira Senior is a 34 y o  female  [facility] Clinical Progress Notes     Specialized Services Documentation  Therapist must complete separate progress note for each specific clinical activity in which the individual participated during the day  Allied Therapy Giovanni Jony actively shared in [facility] group focused on DBT skill of Turning the Mind  She engaged in task of progressive muscle relaxation, matt analysis and discussion  Group explored the idea of willingness versus willfulness, and turning negative self-talk into non-judgmental thinking  Group was introduced to exercise of Half-Smiling and Willing Hands  Group discussed different ways to turn the mind and benefits of doing so  She was given hand-outs on topic  Giovanni Borges shared a mantra of If it's good, I am good"    Tx Plan Objective: 1 1, 1 2, Gus Gallagher, MTI Therapist:  Ernesta Hamman MT-BC

## 2018-01-25 ENCOUNTER — OFFICE VISIT (OUTPATIENT)
Dept: PSYCHOLOGY | Facility: CLINIC | Age: 33
End: 2018-01-25
Payer: MEDICARE

## 2018-01-25 DIAGNOSIS — F42.9 OBSESSIVE-COMPULSIVE DISORDER, UNSPECIFIED TYPE: ICD-10-CM

## 2018-01-25 DIAGNOSIS — F31.4 BIPOLAR I DISORDER, MOST RECENT EPISODE DEPRESSED, SEVERE WITHOUT PSYCHOTIC FEATURES (HCC): Primary | ICD-10-CM

## 2018-01-25 PROCEDURE — 99999 PR OFFICE/OUTPT VISIT,PROCEDURE ONLY: CPT | Performed by: PSYCHIATRY & NEUROLOGY

## 2018-01-25 PROCEDURE — G0177 OPPS/PHP; TRAIN & EDUC SERV: HCPCS

## 2018-01-25 PROCEDURE — G0176 OPPS/PHP;ACTIVITY THERAPY: HCPCS

## 2018-01-25 PROCEDURE — G0410 GRP PSYCH PARTIAL HOSP 45-50: HCPCS

## 2018-01-25 NOTE — PSYCH
Subjective:     Patient ID: Sylvie Moreno is a 34 y o  female  [facility] Clinical Progress Notes      Specialized Services Documentation  Therapist must complete separate progress note for each specific clinical activity in which the individual participated during the day  Allied Therapy 3738-5159 Marquis Lee actively shared in [facility] group focused on self-esteem and self-care  She engaged in matt analysis and discussion  Group was introduced to FAST for keeping self-respect  Group explored ways to increase self-esteem and self-care, and the way they are related  She was given hand-outs on topic  Marquis Lee shared a quote about how self-care is "not just eating chocolate and taking baths, it's about doing some of the hard things that keep us healthy and well", with examples of making money and seeing a therapist  Florentinoharriett Lee shared spending time with her dog as a way she practices self-care  Beginning progress towards goal shared  Continue AT to further encourage practicing self-care to increase self-esteem and wellness   MADI Hernandez Tx Plan Objective: 1 1,1 2 Therapist:  Manny ISRAEL

## 2018-01-25 NOTE — PSYCH
Subjective:     Patient ID: Mo Ragland is a 34 y o  female  [facility] Clinical Progress Notes      Specialized Services Documentation  Therapist must complete separate progress note for each specific clinical activity in which the individual participated during the day  Group Psychotherapy ([id number]) Miri Ag participated in psychotherapy group focused on healthy communication with supports, as well as taking responsibility for one's actions  Miri Ag identified her finances as a stressor today  She actively engaged in group discussion, talking about her past impulsive spending habit and the difficulty she has been in trying to repair the situation  Peers related to each other on this topic, and discussed the importance of open communication with supports in order to have needs and feelings understood in a healthy environment  Moderate progress noted toward goals  Continue psychotherapy group to encourage Miri Ag to explore stressors and coping  Tx Plan Objective: 1 1, 1 2, 1 4, Therapist:  Shaq MOSS    Case Management Note  ([id number])  This CM met with Miri Ag to review treatment plan  She was agreeable with goals, and signed and received copy of same  Discussed her feeling like her depression is lifting and that her obsessive thoughts are a bit more manageable now  She stated she is feeling more hopeful, and wants to look into volunteer opportunities to help provide her with some meaningful structure to her days  Shaq MOSS    Current suicide risk : Low     Medications changes/added/denied? no    Treatment session number: 2    Individual Case Management Visit provided today?  Yes

## 2018-01-25 NOTE — PSYCH
Subjective:     Patient ID: Meri Wong is a 34 y o  female      [facility] Clinical Progress Notes      Education Therapy   Time:  5546-6927    Previous goal met: Yes   Readiness to Learning: Receptive  Barriers to Leaning: None  Learning Assessment  Time: 1793-7053    Education Completed: Illness and Wellness Tools  Teaching Method: Verbal and Written  Shared Area of Learning: Yes   Goal Set: make phone calls   Tx Plan Objective: 1 1,1 2, Therapist:  Delfin ISRAEL

## 2018-01-26 ENCOUNTER — OFFICE VISIT (OUTPATIENT)
Dept: PSYCHOLOGY | Facility: CLINIC | Age: 33
End: 2018-01-26
Payer: MEDICARE

## 2018-01-26 DIAGNOSIS — F42.9 OBSESSIVE-COMPULSIVE DISORDER, UNSPECIFIED TYPE: ICD-10-CM

## 2018-01-26 DIAGNOSIS — F31.4 BIPOLAR I DISORDER, MOST RECENT EPISODE DEPRESSED, SEVERE WITHOUT PSYCHOTIC FEATURES (HCC): Primary | ICD-10-CM

## 2018-01-26 PROCEDURE — G0176 OPPS/PHP;ACTIVITY THERAPY: HCPCS

## 2018-01-26 PROCEDURE — G0410 GRP PSYCH PARTIAL HOSP 45-50: HCPCS

## 2018-01-26 PROCEDURE — 99999 PR OFFICE/OUTPT VISIT,PROCEDURE ONLY: CPT | Performed by: PSYCHIATRY & NEUROLOGY

## 2018-01-26 PROCEDURE — G0177 OPPS/PHP; TRAIN & EDUC SERV: HCPCS

## 2018-01-26 NOTE — PSYCH
Subjective:     Patient ID: Chris Lugo is a 34 y o  female  [facility] Clinical Progress Notes      Specialized Services Documentation  Therapist must complete separate progress note for each specific clinical activity in which the individual participated during the day  Group Psychotherapy   ([phone]) Josey Mcgarry participated in psychotherapy group focused on taking action to make positive changes in one's life  Josey Mcgarry identified leaving her dog home alone as a stressor today  She minimally engaged in group discussion, but appeared attentive to peers throughout the hour with good eye contact, and nodding her head in agreement with some talking points  Group discussed the importance of recognizing personal responsibility in making positive changes in order for life to feel more satisfying  Minimal progress noted toward goals  Continue psychotherapy group to encourage Josey Mcgarry to explore stressors and coping  Tx Plan Objective: 1 1, 1 2, Therapist:  Aida MOSS    Case Management Note  ([id number])  This CM met with Josey Mcgarry to discuss concerns about her anxiety  She stated that she had to force herself to come to program today, but now that she is here, she is glad she came  She expressed some concern about gaps in outpatient therapy appointments after her discharge, and this CM advised that appointments for missing weeks will be discussed with therapist Brent Barnard  Josey Mcgarry noted that she plans to go to her parents' house for the night tonight, so she can walk around the town and take her dog to the post office  She stated she feels the need for a change of scenery and has not been home for a while  She also has plans to go to a pet expo with a friend on Sunday  Crisis/on-call numbers reviewed  Aida MOSS    Current suicide risk : Low       Medications changes/added/denied? unchanged    Treatment session number: 3    Individual Case Management Visit provided today?  Yes

## 2018-01-26 NOTE — PSYCH
Subjective:     Patient ID: Nain Azar is a 34 y o  female  [facility] Clinical Progress Notes      Specialized Services Documentation  Therapist must complete separate progress note for each specific clinical activity in which the individual participated during the day       Education Therapy   Time:  9425-4974    Previous goal met: Yes   Readiness to Learning: Receptive  Barriers to Leaning: None  Learning Assessment  Time: 9357-8741    Education Completed: Illness and Wellness Tools  Teaching Method: Verbal  Shared Area of Learning: Yes   Goal Set: attend [employer] with a friend  Tx Plan Objective: 1 2, 1 4, Therapist:  Aviva ISRAEL

## 2018-01-26 NOTE — PSYCH
Subjective:     Patient ID: Rajinder Oglesby is a 34 y o  female  [facility] Clinical Progress Notes      Specialized Services Documentation  Therapist must complete separate progress note for each specific clinical activity in which the individual participated during the day  Allied Therapy [id number] Maritza Jones actively shared in [facility] group focused on DBT skill Accumulating Positive Emotions  She engaged in egg shaker mindful task, matt analysis, and chime playing  Group explored ways to build and be mindful of positive emotions/experiences  Group was introduced to ABC PLEASE for emotional vulnerability  Group discussed doing pleasant events during weekend to avoid unproductiveness  She was given hand-outs on topic, including pleasant events list  Maritza Jones shared spending time with her dog as a pleasant event she will engage in this weekend  Beginning progress towards goal observed and shared  Continue AT to further encourage accumulating the positives to increase regulation of emotions   MADI Arredondo Plan Objective: 1 1,1 2 Therapist:  Adela ISRAEL

## 2018-01-26 NOTE — PSYCH
Subjective:     Patient ID: Nain Azar is a 34 y o  female  [facility] Clinical Progress Notes      Specialized Services Documentation  Therapist must complete separate progress note for each specific clinical activity in which the individual participated during the day  Group Psychotherapy [id number]  Tx Plan Objective: 1 1,1 2Therapist:  Landa Hoa RN Nella Gaucher participated in weekly wellness group to discuss what particular area of wellness t has been worked on up to today in Program and choice of area to continue working on for recovery as targeted in treatment plan, by choice, or in WRAP  Nella Gaucher shared that she will continue to work on emotions as she feels she is still very emotionally unstable at this time  She was somewhat tearful relating this but stated that she is [id number] [address]  Moderate progress made toward goals  Continue to offer weekly wellness group to focus on goals and identify areas of goal achievement and need

## 2018-01-29 ENCOUNTER — OFFICE VISIT (OUTPATIENT)
Dept: PSYCHOLOGY | Facility: CLINIC | Age: 33
End: 2018-01-29
Payer: MEDICARE

## 2018-01-29 DIAGNOSIS — F42.9 OBSESSIVE-COMPULSIVE DISORDER, UNSPECIFIED TYPE: ICD-10-CM

## 2018-01-29 DIAGNOSIS — F31.4 BIPOLAR I DISORDER, MOST RECENT EPISODE DEPRESSED, SEVERE WITHOUT PSYCHOTIC FEATURES (HCC): Primary | ICD-10-CM

## 2018-01-29 PROCEDURE — 99999 PR OFFICE/OUTPT VISIT,PROCEDURE ONLY: CPT | Performed by: PSYCHIATRY & NEUROLOGY

## 2018-01-29 PROCEDURE — G0176 OPPS/PHP;ACTIVITY THERAPY: HCPCS

## 2018-01-29 PROCEDURE — G0177 OPPS/PHP; TRAIN & EDUC SERV: HCPCS

## 2018-01-29 PROCEDURE — G0410 GRP PSYCH PARTIAL HOSP 45-50: HCPCS

## 2018-01-29 NOTE — PSYCH
Subjective:     Patient ID: Alan Alves is a 34 y o  female  [facility] Clinical Progress Notes      Specialized Services Documentation  Therapist must complete separate progress note for each specific clinical activity in which the individual participated during the day  Allied Therapy  [id number] June Feast actively shared in [facility] group focused on mindfulness  She engaged in [address], object meditation and discussion  She described feeling calmer and able to rest her thoughts during the exercise particularly the object meditation  She also spoke up during discussion related to mantra and a mantra she uses Its good enough for me  Group explored overall goals of mindfulness and benefits to skill practice  DBT diary card given to June Feast to focus on skill application outside of program  Some progress toward goal noted  Continue AT to encourage healthy skill development and practice of explored strategies       Tx Plan Objective: 1 1, Therapist:  Jenniffer ISRAEL    Education Therapy   Time:  2657-2411    Previous goal met: Yes   Readiness to Learning: Receptive  Barriers to Leaning: None  Learning Assessment  Time: 7092-6570    Education Completed: Illness and Wellness Tools  Teaching Method: Verbal and Demonstration  Shared Area of Learning: Yes   Goal Set: stay downstairs until at least 8:30pm  Tx Plan Objective: 1 2, Therapist:  Jenniffer ISRAEL

## 2018-01-29 NOTE — PSYCH
Subjective:     Patient ID: Valencia Coker is a 34 y o  female  [facility] Clinical Progress Notes      Specialized Services Documentation  Therapist must complete separate progress note for each specific clinical activity in which the individual participated during the day  [facility] Milo De La Torre attended psychotherapy group that focused on taking small, manageable steps towards long term life changes  The group engaged in an exercise that addressed and explored personal emotions related to making changes, and were provided with a handout on decisional balance that discussed the costs and benefits of making choices  Dulce Calle identified dealing with her mother as a stressor for today  Dulce Calle somewhat participated in group discussion, sharing that she had a good weekend at the pet expo, and was able to interact with a lot of different people  She shared that she also stayed at her mothers house over the weekend and had to set small boundaries, to allow for positive changes in their relationship  Some minimal progress noted towards goals  Continue psychotherapy group to encourage Dulce Calle to explore stressors and find additional ways of coping   ISA Khan Intern        Tx Plan Objective: 1 1, 1 2, Therapist:  Morro MOSS

## 2018-01-29 NOTE — PSYCH
Subjective:     Patient ID: Navi Urias is a 34 y o  female  [facility] Clinical Progress Notes      Specialized Services Documentation  Therapist must complete separate progress note for each specific clinical activity in which the individual participated during the day  Group Psychotherapy [id number] Tx Plan Objective: 1:1, 1:2 Therapist:  Leo Pradoanthony Ramosdie participated in Wellness Group focusing on improving emotional health and utilizing positive emotions  Handout was shared listing several strategies encouraging positive thinking and emotions  Betsy Perez actively participated and shared five things that elicited positive emotions to assist her in recovery  Betsy Perez is making progress with her goals 1:1, 1:2  Betsy Perez shared moving out of a stressful situation and that she was proud of herself for moving  She also smiled and shared some information about her dog  Continue groups to educate on ways to develop, and practice strategies to stress emotional resiliency in recovery

## 2018-01-29 NOTE — PSYCH
Subjective:     Patient ID: Chris Orozco is a 34 y o  female  [facility] Clinical Progress Notes      Specialized Services Documentation  Therapist must complete separate progress note for each specific clinical activity in which the individual participated during the day  Case Management Note  (12:00-12:10)  This CM met with Dahiana Graham to advise that additional appointment with Phuong Moon for one of the last weeks in February  Also confirmed that a letter was sent to Dr Gold Tobar requested medication schedule for both Sertraline decrease and clomipramine increase  Awaiting response  Ottoniel MOSS    Current suicide risk : Low     Medications changes/added/denied? unchanged    Treatment session number: 4    Individual Case Management Visit provided today?  Yes

## 2018-01-30 ENCOUNTER — OFFICE VISIT (OUTPATIENT)
Dept: BEHAVIORAL/MENTAL HEALTH CLINIC | Facility: CLINIC | Age: 33
End: 2018-01-30

## 2018-01-30 ENCOUNTER — OFFICE VISIT (OUTPATIENT)
Dept: PSYCHOLOGY | Facility: CLINIC | Age: 33
End: 2018-01-30
Payer: MEDICARE

## 2018-01-30 ENCOUNTER — OFFICE VISIT (OUTPATIENT)
Dept: INTERNAL MEDICINE CLINIC | Facility: CLINIC | Age: 33
End: 2018-01-30
Payer: MEDICARE

## 2018-01-30 VITALS
SYSTOLIC BLOOD PRESSURE: 128 MMHG | WEIGHT: 293 LBS | DIASTOLIC BLOOD PRESSURE: 76 MMHG | HEART RATE: 57 BPM | HEIGHT: 67 IN | BODY MASS INDEX: 45.99 KG/M2 | OXYGEN SATURATION: 98 %

## 2018-01-30 DIAGNOSIS — R73.03 PREDIABETES: ICD-10-CM

## 2018-01-30 DIAGNOSIS — S99.922D INJURY OF TOE ON LEFT FOOT, SUBSEQUENT ENCOUNTER: Primary | ICD-10-CM

## 2018-01-30 DIAGNOSIS — F31.4 BIPOLAR I DISORDER, MOST RECENT EPISODE DEPRESSED, SEVERE WITHOUT PSYCHOTIC FEATURES (HCC): Primary | ICD-10-CM

## 2018-01-30 DIAGNOSIS — F42.9 OBSESSIVE-COMPULSIVE DISORDER, UNSPECIFIED TYPE: ICD-10-CM

## 2018-01-30 PROCEDURE — G0410 GRP PSYCH PARTIAL HOSP 45-50: HCPCS

## 2018-01-30 PROCEDURE — 99999 PR OFFICE/OUTPT VISIT,PROCEDURE ONLY: CPT | Performed by: PSYCHIATRY & NEUROLOGY

## 2018-01-30 PROCEDURE — G0177 OPPS/PHP; TRAIN & EDUC SERV: HCPCS

## 2018-01-30 PROCEDURE — 99213 OFFICE O/P EST LOW 20 MIN: CPT | Performed by: NURSE PRACTITIONER

## 2018-01-30 PROCEDURE — G0176 OPPS/PHP;ACTIVITY THERAPY: HCPCS

## 2018-01-30 NOTE — PSYCH
Subjective:     Patient ID: Sylvie Moreno is a 34 y o  female  [facility] Clinical Progress Notes      Specialized Services Documentation  Therapist must complete separate progress note for each specific clinical activity in which the individual participated during the day  Group Psychotherapy [id number] Tx Plan Objective: 1:1,1:2 Therapist:  Isela Rod participated in wellness group focused on the relationship between sleep disturbances, mood disturbances and also immunity  Marquis Lee shared that she did get restful sleep last night  She added that she is currently working on attempting to stay up longer at night and not isolate to her room in her evening routine  Marquis Lee made progress towards goal 1:1, 1:2  Marquis Lee was an active and engaged participant in this group  Continue group to provide education on how to improve mood through development of healthier sleeping patterns

## 2018-01-30 NOTE — PSYCH
Subjective:     Patient ID: Jimmye Merlin is a 34 y o  female  [facility] Clinical Progress Notes      Specialized Services Documentation  Therapist must complete separate progress note for each specific clinical activity in which the individual participated during the day  Allied Therapy [id number] Jeffrey Shook actively shared in [facility] group focused on relaxation techniques and DBT skill of Distracting for Distress Tolerance  She engaged in therapist-led relaxation exercises and fill-in-the-blank task  Group explored progressive muscle relaxation, breath counting, visualization, STOP skill, and pros and cons  Group was introduced to ACCEPTS as different things that can be done to help distract  Jeffrey Shook shared listening to music as a way to distract herself when distressed  Some good progress towards goal observed and shared  Continue AT to further encourage using distraction techniques when distressed    Tx Plan Objective: 1 1 Therapist:  Harley Fernando MT    Education Therapy   Time:  6311-4167  Present  Previous goal met: Yes   Readiness to Learning: Receptive  Barriers to Leaning: None  Learning Assessment  Time: 3822-3876  Present  Education Completed: Illness and Wellness Tools  Teaching Method: Verbal, Written and Demonstration  Shared Area of Learning: Yes   Goal Set: Work on [employer]  Tx Plan Objective: 1 1, 1 2 Therapist:  Harley Fernando MT

## 2018-01-30 NOTE — PSYCH
Subjective:     Patient ID: Meryl Pritchard is a 34 y o  female  [facility] Clinical Progress Notes      Specialized Services Documentation  Therapist must complete separate progress note for each specific clinical activity in which the individual participated during the day  Group Psychotherapy   [id number] Dagobertodayron Bueno actively shared in psychotherapy focused on WRAP development and use  She spontaneously engaged throughout group exploring key facets of recovery and sections of the WRAP during group task  She shared examples of categories and took notes for her own WRAP  Hodan Bueno reported benefitting from the concept of learning to be her own support  Group explored the benefits of WRAP development and strategies to using this tool to support ongoing recovery  Some progress noted toward goal   Continue psychotherapy to explore recovery strategies and ways to support ones self     Tx Plan Objective: 1 1,1 2  Therapist:  Adalgisa ISRAEL

## 2018-01-30 NOTE — PROGRESS NOTES
Assessment/Plan:     Diagnoses and all orders for this visit:    Injury of toe on left foot, subsequent encounter  -     diclofenac sodium (VOLTAREN) 1 %; Apply 2 g topically 4 (four) times a day  -     XR foot 3+ vw left; Future    Prediabetes  -     Hemoglobin A1c; Future        See Dr Fransico Cassidy next Thursday   voltaren gel and epsom salt soaks    Subjective:      Patient ID: Alan Alves is a 34 y o  female  HPI    The following portions of the patient's history were reviewed and updated as appropriate: allergies, current medications, past family history, past medical history, past social history, past surgical history and problem list     Review of Systems   Constitutional: Negative  HENT: Negative  Eyes: Negative  Respiratory: Negative  Cardiovascular: Negative  Musculoskeletal: Positive for arthralgias and joint swelling  Family History   Problem Relation Age of Onset    Depression Mother     Suicide Attempts Mother     No Known Problems Father     No Known Problems Brother      Past Medical History:   Diagnosis Date    Anxiety     Back pain     Bipolar disorder ([facility] [address] )     Hypertension     Hypothyroid     Obsessive-compulsive disorder     Overactive bladder     Psychiatric disorder     Restless leg syndrome, controlled     Seasonal allergies      Social History     Social History    Marital status: Single     Spouse name: N/A    Number of children: N/A    Years of education: N/A     Occupational History    Not on file       Social History Main Topics    Smoking status: Never Smoker    Smokeless tobacco: Never Used    Alcohol use No    Drug use: No    Sexual activity: Not on file     Other Topics Concern    Not on file     Social History Narrative    No narrative on file       Current Outpatient Prescriptions:     Cholecalciferol (VITAMIN D3) 5000 units CAPS, Take 5,000 Units by mouth daily, Disp: , Rfl:     clomiPRAMINE (ANAFRANIL) 25 mg capsule, Take 25 mg by mouth every other day, Disp: , Rfl:     gabapentin (NEURONTIN) 600 MG tablet, Take 900 mg by mouth daily at bedtime, Disp: , Rfl:     HYDROcodone-acetaminophen (NORCO) 5-325 mg per tablet, Take 1 tablet by mouth 2 (two) times a day as needed for pain (severe pain), Disp: , Rfl:     lamoTRIgine (LaMICtal) 150 MG tablet, Take 2 tablets by mouth daily at bedtime for 30 days, Disp: 60 tablet, Rfl: 0    levocetirizine (XYZAL) 5 MG tablet, Take 5 mg by mouth every evening, Disp: , Rfl:     levothyroxine 100 mcg tablet, Take 1 tablet by mouth daily in the early morning for 30 days, Disp: 30 tablet, Rfl: 0    lisinopril (ZESTRIL) 10 mg tablet, Take 1 tablet by mouth daily, Disp: 30 tablet, Rfl: 0    lithium carbonate (LITHOBID) 450 mg CR tablet, Take 900 mg by mouth daily at bedtime, Disp: , Rfl:     LORazepam (ATIVAN) 1 mg tablet, Take 1 mg by mouth 3 (three) times a day as needed for anxiety, Disp: , Rfl:     lurasidone (LATUDA) 120 mg tablet, Take 1 tablet by mouth daily with dinner for 30 days, Disp: 30 tablet, Rfl: 0    Mirabegron ER 50 MG TB24, Take 1 tablet by mouth daily for 30 days, Disp: 30 tablet, Rfl: 0    Multiple Vitamin (MULTIVITAMIN) capsule, Take 1 capsule by mouth daily, Disp: , Rfl:     omeprazole (PriLOSEC) 20 mg delayed release capsule, Take 1 capsule by mouth daily in the early morning for 30 days, Disp: 30 capsule, Rfl: 0    oxybutynin (DITROPAN-XL) 10 MG 24 hr tablet, Take 10 mg by mouth daily, Disp: , Rfl:     rOPINIRole (REQUIP) 3 mg tablet, Take 1 tablet by mouth every 12 (twelve) hours for 30 days Take in a morning and at bedtime, Disp: 60 tablet, Rfl: 0    sertraline (ZOLOFT) 100 mg tablet, Take 2 tablets by mouth daily for 30 days, Disp: 60 tablet, Rfl: 0    diclofenac sodium (VOLTAREN) 1 %, Apply 2 g topically 4 (four) times a day, Disp: 1 Tube, Rfl: 0  No Known Allergies    Objective:  Vitals:    01/30/18 1554   BP: 128/76   Pulse: 57   SpO2: 98%      Physical Exam Constitutional: She is oriented to person, place, and time  She appears well-developed and well-nourished  HENT:   Head: Normocephalic and atraumatic  Eyes: Conjunctivae are normal  Pupils are equal, round, and reactive to light  Neck: Normal range of motion  Cardiovascular: Normal rate and regular rhythm  Pulmonary/Chest: Effort normal and breath sounds normal    Abdominal: Soft  Bowel sounds are normal    Musculoskeletal:        Feet:    Neurological: She is alert and oriented to person, place, and time

## 2018-01-30 NOTE — PSYCH
Subjective:     Patient ID: Callum Maldonado is a 34 y o  female  [facility] Clinical Progress Notes      Specialized Services Documentation  Therapist must complete separate progress note for each specific clinical activity in which the individual participated during the day  Case Management Note  [id number]  This CM met with Hugo Drew to discuss her concerns with OP therapist   Waqar Molina to communicate concerns openly with her therapist in order to process events/worries and build on relationship/communicate skills  Valeria Reyes MSW    Current suicide risk : Low     Medications changes/added/denied? unchanged    Treatment session number: 5    Individual Case Management Visit provided today?  Yes

## 2018-01-31 ENCOUNTER — OFFICE VISIT (OUTPATIENT)
Dept: PSYCHOLOGY | Facility: CLINIC | Age: 33
End: 2018-01-31
Payer: MEDICARE

## 2018-01-31 ENCOUNTER — APPOINTMENT (OUTPATIENT)
Dept: RADIOLOGY | Age: 33
End: 2018-01-31
Payer: MEDICARE

## 2018-01-31 DIAGNOSIS — S99.922D INJURY OF TOE ON LEFT FOOT, SUBSEQUENT ENCOUNTER: ICD-10-CM

## 2018-01-31 DIAGNOSIS — F42.9 OBSESSIVE-COMPULSIVE DISORDER, UNSPECIFIED TYPE: ICD-10-CM

## 2018-01-31 DIAGNOSIS — F31.4 BIPOLAR I DISORDER, MOST RECENT EPISODE DEPRESSED, SEVERE WITHOUT PSYCHOTIC FEATURES (HCC): Primary | ICD-10-CM

## 2018-01-31 PROCEDURE — G0176 OPPS/PHP;ACTIVITY THERAPY: HCPCS

## 2018-01-31 PROCEDURE — 99999 PR OFFICE/OUTPT VISIT,PROCEDURE ONLY: CPT | Performed by: PSYCHIATRY & NEUROLOGY

## 2018-01-31 PROCEDURE — G0410 GRP PSYCH PARTIAL HOSP 45-50: HCPCS

## 2018-01-31 PROCEDURE — G0177 OPPS/PHP; TRAIN & EDUC SERV: HCPCS

## 2018-01-31 PROCEDURE — 73630 X-RAY EXAM OF FOOT: CPT

## 2018-01-31 NOTE — PSYCH
Subjective:     Patient ID: Babak Lorenzo is a 34 y o  female  [facility] Clinical Progress Notes      Specialized Services Documentation  Therapist must complete separate progress note for each specific clinical activity in which the individual participated during the day  Group Psychotherapy   ([id number]) Reyna Dupree attended psychotherapy group focused on anger and how it can interfere with relationships  Reyna Dupree identified anticipating a difficult conversation with her mother as a stressor for today  She moderately engaged in group discussion, noting that she does not react well to anger, because of family dynamics growing up and it triggering her anxiety  She expressed the desire to learn how to be more assertive with her needs so that she interacts with her family in healthier ways  Group discussed ways to begin to assertively express needs and feelings when a situation triggers their anger, instead of letting anger build up to the point of exploding  Some moderate progress noted toward goals  Continue psychotherapy group to encourage Reyna Dupree to explore stressors and coping  Tx Plan Objective: 1 1, 1 2, Therapist:  Vero MOSS    Case Management Note  [id number] This CM met briefly with Reyna Dupree to advise that no response has been received from Dr Emile Obrien yet  She noted that she would call the office and ask him to respond as soon as possible  After calling and speaking with , Dr Emile Obrien is out of office today but will return tomorrow, and will send response  Vero MOSS    Current suicide risk : Low     Medications changes/added/denied? unchanged    Treatment session number: 6    Individual Case Management Visit provided today?  Yes

## 2018-01-31 NOTE — PSYCH
Subjective:     Patient ID: Mo Ragland is a 34 y o  female  [facility] Clinical Progress Notes      Specialized Services Documentation  Therapist must complete separate progress note for each specific clinical activity in which the individual participated during the day  Group Psychotherapy [id number] Tx Plan Objective: 1:1,1:2, Therapist:  Kurtis Martin participated in wellness group focused on identifying present coping strategies when depressed, anxious, or stressed and learning to substitute with healthier strategies  Miri Ag participated and shared during this wellness group  Miri Ag made progress toward goal 1:1, 1:2  Continue to offer group education regarding the use of healthy coping skills  In addition, provided information on journaling and positive affirmations  Journal and blank index cards provided during group for individual journaling

## 2018-01-31 NOTE — PSYCH
Subjective:     Patient ID: Chad Huang is a 34 y o  female  [facility] Clinical Progress Notes      Specialized Services Documentation  Therapist must complete separate progress note for each specific clinical activity in which the individual participated during the day  Allied Therapy 4460-2916 Betzaida Novoa actively shared in allied therapy group focused on decreasing self-stigmas and support  She attentively listened to [facility] share his life story  Group encouraged power of learning about self, accepting illness, and personal responsibility in recovery  Community resources reviewed  Betzaida Novoa shared that she benefitted from learning the breaking the cycle of shame chant  Good progress towards goal shared  Continue AT to encourage self-awareness and healthy engagement of support   Tx Plan Objective: 1 1 Therapist:  Cherrie Santiago MT    Education Therapy   Time:  2115-3715  Present  Previous goal met: Yes   Readiness to Learning: Receptive  Barriers to Leaning: None  Learning Assessment  Time: 0834-5279  Present  Education Completed: Illness and Wellness Tools  Teaching Method: Verbal and Written  Shared Area of Learning: Yes   Goal Set: Talk to Mom  Tx Plan Objective: 1 1, 1 2 Therapist:  Cherrie Santiago MT

## 2018-02-01 ENCOUNTER — OFFICE VISIT (OUTPATIENT)
Dept: PSYCHOLOGY | Facility: CLINIC | Age: 33
End: 2018-02-01
Payer: MEDICARE

## 2018-02-01 DIAGNOSIS — F42.9 OBSESSIVE-COMPULSIVE DISORDER, UNSPECIFIED TYPE: ICD-10-CM

## 2018-02-01 DIAGNOSIS — F31.4 BIPOLAR I DISORDER, MOST RECENT EPISODE DEPRESSED, SEVERE WITHOUT PSYCHOTIC FEATURES (HCC): Primary | ICD-10-CM

## 2018-02-01 PROCEDURE — G0176 OPPS/PHP;ACTIVITY THERAPY: HCPCS

## 2018-02-01 PROCEDURE — G0410 GRP PSYCH PARTIAL HOSP 45-50: HCPCS

## 2018-02-01 PROCEDURE — G0177 OPPS/PHP; TRAIN & EDUC SERV: HCPCS

## 2018-02-01 PROCEDURE — 99999 PR OFFICE/OUTPT VISIT,PROCEDURE ONLY: CPT | Performed by: PSYCHIATRY & NEUROLOGY

## 2018-02-01 NOTE — PSYCH
Subjective:     Patient ID: Rajinder Oglesby is a 34 y o  female  [facility] Clinical Progress Notes      Specialized Services Documentation  Therapist must complete separate progress note for each specific clinical activity in which the individual participated during the day  Group Psychotherapy [id number] Tx Plan Objective: 1:1, 1:2Therapist:  [id number] participated in wellness group focused on choosing how to handle difficult situations that arise in daily life and stress management techniques  Ximenarobert Princes shared and made good progress towards goals 1:1, 1:2  Continue to offer group to present education on handling stressors with positive problem solving, identifying negative thinking and problem solving and turning to using cognitive and behavioral strategies to arrive at more positive outcomes

## 2018-02-01 NOTE — PSYCH
Subjective:     Patient ID: Meri Wong is a 34 y o  female  [facility] Clinical Progress Notes      Specialized Services Documentation  Therapist must complete separate progress note for each specific clinical activity in which the individual participated during the day         Education Therapy   Time:  8894-2602    Previous goal met: Yes   Readiness to Learning: Receptive  Barriers to Leaning: None  Learning Assessment  Time: 5211-3069    Education Completed: Illness and Wellness Tools  Teaching Method: Verbal and Demonstration  Shared Area of Learning: Yes   Goal Set: to use a skill to relax  Tx Plan Objective: 1 1, Therapist:  Delfin ISRAEL

## 2018-02-01 NOTE — PSYCH
Subjective:     Patient ID: Valencia Coker is a 34 y o  female  [facility] Clinical Progress Notes      Specialized Services Documentation  Therapist must complete separate progress note for each specific clinical activity in which the individual participated during the day  Group Psychotherapy Case Management Note  ([id number]) Dulce Calle participated in psychotherapy group focused on managing symptoms of anxiety and panic  Dulce Calle identified severe anxiety and having a panic attack earlier as a stressor today  She minimally engaged in group discussion, occasionally adding comments about grounding techniques that can help with managing anxiety and panic attacks  Minimal progress noted toward goals  Continue psychotherapy group to encourage Dulce Calle to explore stressors and coping  Tx Plan Objective: 1 1, 1 2, Therapist:  Morro MOSS    Case Management:  [id number]  [facility] came to this CM's office and expressed feeling extremely anxious and needing help calming down  She said that she spent the past several minutes in the bathroom trying to calm herself down, but was still struggling  She talked about concerns with OP therapist, worrying that dropping off a letter this morning and trying to contact her might get her in "trouble "  This CM discussed utilizing group and staff in program to provide support to her while she is in treatment, and assured her that her OP therapist is simply respecting the treatment process while she is in [facility] by having no contact with her currently  Also discussed how her relationship with her mother and her mother's invalidation and unpredictable behavior is impacting her current relationships, and the importance of beginning to reflect on this interaction  Elizabeth was able to calm down and go into the next group after this discussion    Morro MOSS    Current suicide risk : Low     Medications changes/added/denied? unchanged    Treatment session number: 7    Individual Case Management Visit provided today?  Yes

## 2018-02-01 NOTE — PSYCH
Subjective:     Patient ID: Kelli Cordova is a 34 y o  female  [facility] Clinical Progress Notes      Specialized Services Documentation  Therapist must complete separate progress note for each specific clinical activity in which the individual participated during the day  Allied Therapy [id number] Trinidad Gubeatriz actively shared in [facility] group focused on assertiveness from DBT module Interpersonal Effectiveness  She engaged in role-play instrument improvisation task  Group explored assertiveness, passive, passive aggressive, and aggressive  Group was introduced to [address], and FAST as ways to be assertive in getting what you want without hurting the relationship or losing self-respect  Trinidad Maciel shared that she wants to work on being more assertive  Some progress towards goal shared  Continue AT to further practice assertiveness to increase healthy communication   Tx Plan Objective: 1 1  Therapist:  Mehnaz Shirley MT

## 2018-02-02 ENCOUNTER — OFFICE VISIT (OUTPATIENT)
Dept: PSYCHOLOGY | Facility: CLINIC | Age: 33
End: 2018-02-02
Payer: MEDICARE

## 2018-02-02 DIAGNOSIS — F42.9 OBSESSIVE-COMPULSIVE DISORDER, UNSPECIFIED TYPE: ICD-10-CM

## 2018-02-02 DIAGNOSIS — F31.4 BIPOLAR I DISORDER, MOST RECENT EPISODE DEPRESSED, SEVERE WITHOUT PSYCHOTIC FEATURES (HCC): Primary | ICD-10-CM

## 2018-02-02 PROCEDURE — G0177 OPPS/PHP; TRAIN & EDUC SERV: HCPCS

## 2018-02-02 PROCEDURE — G0410 GRP PSYCH PARTIAL HOSP 45-50: HCPCS

## 2018-02-02 PROCEDURE — 99999 PR OFFICE/OUTPT VISIT,PROCEDURE ONLY: CPT | Performed by: PSYCHIATRY & NEUROLOGY

## 2018-02-02 PROCEDURE — G0176 OPPS/PHP;ACTIVITY THERAPY: HCPCS

## 2018-02-02 NOTE — PSYCH
Subjective:     Patient ID: Kelli Cordova is a 34 y o  female  [facility] Clinical Progress Notes      Specialized Services Documentation  Therapist must complete separate progress note for each specific clinical activity in which the individual participated during the day  Group Psychotherapy   [id number] Trinidad Maciel excused related to case management meeting     Tx Plan Objective: , Therapist:  Nikolai ISRAEL

## 2018-02-02 NOTE — PSYCH
Subjective:     Patient ID: Chad Huang is a 34 y o  female  [facility] Clinical Progress Notes      Specialized Services Documentation  Therapist must complete separate progress note for each specific clinical activity in which the individual participated during the day  Allied Therapy 2232-8657 Betzaida Novoa actively shared in [facility] group focused on [address]  She engaged in emotion regulation categories game  Group discussed the need to understand and name emotions to be able to change or manage them  Group explored prompting events, biological changes, expressions/actions, and aftereffects of different emotions  Betzaida Novoa spontaneously shared and answered questions throughout group  Betzaida Novoa shared that something that prompts sadness for her is being ignored  Some small progress towards goal observed  Continue AT to further increase understanding of emotions to help with regulation   Tx Plan Objective: 1 1 Therapist:  Cherrie Santiago MT    Education Therapy   Time:  0004-2707  Present  Previous goal met: Yes   Readiness to Learning: Receptive  Barriers to Leaning: None  Learning Assessment  Time: 9043-5844  Present  Education Completed: Illness and Wellness Tools  Teaching Method: Verbal, Written, A/V and Demonstration  Shared Area of Learning: No  Goal Set: Stay in the moment  Tx Plan Objective: 1 1 Therapist:  Cherrie Santiago MT

## 2018-02-02 NOTE — PSYCH
Assessment/Plan:      There are no diagnoses linked to this encounter  Subjective:     Patient ID: Devyn Almodovar is a 34 y o  female  [facility] Treatment Plan   AREAS OF NEED: Depression and anxiety as evidenced by excessive worry, obsessive thoughts, anhedonia, low mood, poor concentration, suicidal thoughts without a plan or intent  Date Initiated: 1/24/2018    Strengths: Compliant with treatment and very motivated to find a solution to help her improve; compassionate; loves her dog    LONG TERM GOAL: 1 0  I will identify three signs that my anxiety and depression have improved and that I feel like my life feels more manageable  Date Initiated: 1/24/2018  Target Date: 2/21/2018    Completion Date:     SHORT TERM OBJECTIVES:   1 1  I will identify at least two relaxation or mindfulness techniques that I learn in the program that help reduce my symptoms of anxiety, and will practice at least one daily  Date Initiated: 1/24/2018  Revision Date: 2/2/2018  Target Date: 2/13/2018  Completion Date:     1 2  I will begin to look into activities that I can do outside of program, such as volunteer work or a hobby, that can provide some structure into my day and help me feel more productive  Date Initiated: 1/24/2018  Revision Date: 2/2/2018  Target Date: 2/13/2018  Completion Date:    1 3 I will take my medications as prescribed, and if I have any questions or concerns, I will address them with program staff  Date Initiated: 1/24/2018  Revision Date: 2/2/2018  Target Date: 2/13/2018  Completion Date:     1 4 I will identify two positive supports in my life, and at least two ways that my supports can help me with my recovery    Date Initiated: 1/24/2018  Revision Date: 2/2/2018  Target Date: 2/13/2018  Completion Date:         7 DAY REVISION:    1 5 I will work on my WRAP to identify positive ways that I will support my wellness after discharge from the program   Date Initiated: 2/2/2018  Revision Date: Target Date: 2018  Completion Date:    PSYCHIATRY:  Medication management and education  Continue medication management  Date Initiated: 2018       Revision Date: 2018  The person(s) responsible for carrying out the plan is Nader Barakat MD    NURSIN 1, 1 2,1 3, 1 4 Provide wellness/education group 5 times weekly to teach Dustin Yates about the signs and symptoms of her diagnoses, wellness tools, and medications  1 1, 1 2, 1 3, 1 4, 1 5  Continue to provide wellness/education group daily to allow Dustin Yates the opportunity to continue learning about her diagnoses, and wellness tools and medications that can help with her recovery  Date Initiated: 2018  Revision Date: 2018  The person(s) responsible for carrying out the plan is Marcos Martin RN and Miranda Garza RN    PSYCHOLOGY:        1 1, 1 2, 1 4  Provide psychotherapy group 5 times per week to allow opportunity for Dustin Milan to explore stressors and ways of coping  1 , 1 2, 1 4, 1 5  Continue to provide psychotherapy group each program day to encourage Dustin Milan to further explore life stressors and healthier ways of coping  Date Initiated: 2018  Revision Date: 2018  The person(s) responsible for carrying out the plan is ISA SalazarW    ALLIED THERAPY:   1 1, 1 2 Engage Yamilex Craig in AT group 5 times daily to encourage development and use of wellness tools to decrease symptoms and promote recovery through meaningful activity  1 1, 1 2, 1 5  Continue to engage Dustin Yates in AT group 5 times weekly to further develop skill level in use of wellness tools in order to decrease symptoms and promote recovery through meaningful activity    Date Initiated: 2018  Revision Date: 2018  The person(s) responsible for carrying out the plan is MORIAH Blanco    CASE MANAGEMENT:   1 0  This  will meet with Dustin Yates 3-4 times weekly to assess treatment progress, discharge planning, connection to community supports and UR as indicated  1 0  Continue to meet with [facility] at least 3 times per week to monitor progress in treatment, discharge readiness, and aftercare plans  Date Initiated: 1/24/2018  Revision Date: 2/2/2018  The person(s) responsible for carrying out the plan is Emaline Holstein, MSW LSW      TREATMENT REVIEW/COMMENTS:     DISCHARGE CRITERIA: Identify three signs of improvement and complete relapse prevention plan  DISCHARGE PLAN: Return to outpatient providers  Estimated Length of Stay: 10 treatment days    Diagnosis and Treatment Plan explained to Lisset Calderon relates understanding diagnosis and is agreeable to Treatment Plan             CLIENT COMMENTS / Please share your thoughts, feelings, need and/or experiences regarding your treatment plan: _____________________________________________________________________________________________________________________________________________________________________________________________________________________________________________________________________________________________________________________ Date/Time: ______________     Patient Signature: _________________________________     Date/Time: ______________      Signature: _________________________________     Date/Time: ______________

## 2018-02-02 NOTE — PSYCH
Subjective:     Patient ID: Valencia Coker is a 34 y o  female  [facility] Clinical Progress Notes      Specialized Services Documentation  Therapist must complete separate progress note for each specific clinical activity in which the individual participated during the day  Case Management Note  [id number]  This CM met with Dulce Calle to discuss her anxiety over her roommate emailing her OP therapist this morning  Elizabeth described feeling upset because her roommate is still in contact with therapist but she has not been allowed to email  She wanted to cut herself following this realization this morning and did not know what to do except email her therapist   She expressed feeling as though she "will be in trouble for emailing her" now  This CM had a long discussion with Elizabeth about other supports such as on-call psychiatrist and crisis hotline, particularly in this situation  Also encouraged her to have an honest discussion with her therapist at her next OP session about her feelings and beginning to be as real as possible to build a healthier relationship and learn to be able to do so with others outside of the therapeutic environment  Elizabeth seemed to have some small gain in insight as to why she is reacting so strongly to this situation, and noted that while it is difficult for her to talk about her feelings, she will attempt to do so  This CM also encouraged Elizabeth to continue with plans to volunteer and find a job, as well as engage in her creative activities in order to feel like her life is more satisfying and engaging  Morro MOSS    Current suicide risk : Low       Medications changes/added/denied? unchanged    Treatment session number: 8    Individual Case Management Visit provided today?  Yes

## 2018-02-02 NOTE — PSYCH
Subjective:     Patient ID: Chad Huang is a 34 y o  female  [facility] Clinical Progress Notes      Specialized Services Documentation  Therapist must complete separate progress note for each specific clinical activity in which the individual participated during the day  Group Psychotherapy [id number] Tx Plan Objective: 1:1,1:2, Therapist:  Leroy Paul participated in weekly wellness group to discuss what particular area of wellness has been worked up to today and choice area or areas to continue to work on targeted in treatment plan, by choice, or in [place] [facility]  Betzaida Novoa shared that vocational and emotional are her areas of focus  Facilitator and peers supported Betzaida Novoa in taking the small steps in practicing self care and attaining goals  Peers also gave her feedback that her tactile distraction, a dog toy was a good distraction tool  Betzaida Novoa voiced she believed peers would think having the toy out and holding it would be dumb  After the feedback from her peers Betzaida Novoa appeared more relaxed and was smiling  Betzaida Novoa is  making progress towards her goals  Betzaida Novoa did stay in the entire group but did verbalize that she wanted to talk and process her feelings with David Ruiz  Continue to offer weekly wellness group to focus on goals and identifying areas of goal achievement and need

## 2018-02-05 ENCOUNTER — OFFICE VISIT (OUTPATIENT)
Dept: PSYCHOLOGY | Facility: CLINIC | Age: 33
End: 2018-02-05
Payer: MEDICARE

## 2018-02-05 DIAGNOSIS — F42.9 OBSESSIVE-COMPULSIVE DISORDER, UNSPECIFIED TYPE: ICD-10-CM

## 2018-02-05 DIAGNOSIS — F31.4 BIPOLAR I DISORDER, MOST RECENT EPISODE DEPRESSED, SEVERE WITHOUT PSYCHOTIC FEATURES (HCC): Primary | ICD-10-CM

## 2018-02-05 PROCEDURE — G0176 OPPS/PHP;ACTIVITY THERAPY: HCPCS

## 2018-02-05 PROCEDURE — 99999 PR OFFICE/OUTPT VISIT,PROCEDURE ONLY: CPT | Performed by: PSYCHIATRY & NEUROLOGY

## 2018-02-05 PROCEDURE — G0410 GRP PSYCH PARTIAL HOSP 45-50: HCPCS

## 2018-02-05 PROCEDURE — G0177 OPPS/PHP; TRAIN & EDUC SERV: HCPCS

## 2018-02-05 NOTE — PSYCH
Subjective:     Patient ID: Callum Maldonado is a 34 y o  female  [facility] Clinical Progress Notes      Specialized Services Documentation  Therapist must complete separate progress note for each specific clinical activity in which the individual participated during the day  Group Psychotherapy  10:45-11:45 Hugo Drew participated in psychotherapy group focused on regulating emotions and establishing prioritizes toward ones own recovery  She identified finances as a stressor for today  Hugo Drew mildly participated in group discussion, sharing that her mom came to visit over the weekend, which was the first time since she had moved to the area  Hugo Drew expressed that it was a positive encounter, despite the ongoing struggles she has had with her mother over the last few years  She expressed that it is important that she try to repair the relationship, taking one step at a time  Some small progress noted toward goals  Continue psychotherapy group to encourage Hugo Drew to explore stressors and coping  Tx plan:  1 1, 1 2  Therapist:  ISA Khoury    Case Management Note  11:50-11:55  This CM met with Elizabeth to provide relapse prevention plan in preparation for discharge tomorrow  She will complete and return tomorrow  She advised that follow up appointment with Dr Esperanza Lazaor is Feb 13 at 1:15pm   She also spoke with his office yesterday and they advised her to start taking Anafranil 25 mg every day instead of every other  Valeria MOSS    Current suicide risk : Low     Medications changes/added/denied? unchanged    Treatment session number: 9    Individual Case Management Visit provided today?  Yes

## 2018-02-05 NOTE — PSYCH
Subjective:     Patient ID: Rajinder Oglesby is a 34 y o  female  [facility] Clinical Progress Notes        Group Psychotherapy  [id number]  Maritza Karen participated in [facility] group focused on weight gain or loss related to medications and other factors such as not recognizing the difference between hunger and cravings  Baldomero Ghazal was attentive to handouts and education  She shared that she has an appetite "all the time" and discussed learning the different between feeling hungry and having a desire to eat something  Ximenarobert Jones shared that she has gained significant weight on medications  She recognizes that she does not eat healthy when feeling more depressed and participated in discussion of making better food choices  Maritza Jones made moderate progress toward goals  Continue to provide group to educate on adopting healthy nutritional regimes when on or off medications and in recovery to improve overall health and well-being   Tx Plan Objectives: 1 1,1 2,1 3    Therapist:  Christina Bentley RN

## 2018-02-05 NOTE — PSYCH
Subjective:     Patient ID: Avril Pearce is a 34 y o  female  [facility] Clinical Progress Notes      Specialized Services Documentation  Therapist must complete separate progress note for each specific clinical activity in which the individual participated during the day  Allied Therapy [phone] Mariel Crow actively shared in [facility] group focused on DBT skill [place]   She engaged in task of observing, describing, and participating in instrument playing  Group explored differences between emotional mind, reasonable mind, and wise mind  Group also explored different mindfulness exercises that promote wise mind  She was given hand-outs on topic, including DBT skill diary card for the week  Mariel Crow shared she is often in emotion mind  Some progress towards goal shared  Continue AT to further practice mindfulness skills   Tx Plan Objective: 1 1 Therapist:  Kami Ford MT    Education Therapy   Time:  1029-4674  Present  Previous goal met: Yes   Readiness to Learning: Receptive  Barriers to Leaning: None  Learning Assessment  Time: 8216-1194  Present  Education Completed: Illness and Wellness Tools  Teaching Method: Verbal, Written and Demonstration  Shared Area of Learning: Yes   Goal Set: Stay in the moment  Tx Plan Objective: 1 1 Therapist:  Kami Ford MT

## 2018-02-05 NOTE — PSYCH
[facility] Discharge Instructions:   Disposition: home  Address: Serge Sanz, [address] [place] [address]  Diagnosis: Bipolar I Disorder, OCD  Allergies (Drug/Food): No Known Allergies  Activity: you may not return to work until n/a  Diet:no recommendations  Smoking Cessation:not a smoker   Diagnostic/Laboratory Orders: none ordered  Vaccines: If you received a vaccine, please notify your family physician on your next visit  For more information, please call [phone]  Follow-up appointments/Referrals: Ike Velazquez LCSW  Friday, February 9, 10:00;  Dr Chen Leblanc  Tuesday, February 13, 1:15 pm     [facility]: Conniefrancesco Saenz [phone]  Intake/Referral/Evaluation (Non-Emergency) *NON INSURED FOR FUNDING: [place]: [facility] (Emergency) [place] Service: [place]: [address] [place] [address] [address]: [phone]  I, the undersigned, have received and understand the above instructions          Patient/Rep Signature: __________________________________       Date/Time: ______________         Relationship: __________________________________________       Date/Time: ______________         Physician Signature: ____________________________________      Date/Time: ______________               Signature: ________________________________       Date/Time: ______________

## 2018-02-06 ENCOUNTER — OFFICE VISIT (OUTPATIENT)
Dept: PSYCHOLOGY | Facility: CLINIC | Age: 33
End: 2018-02-06
Payer: MEDICARE

## 2018-02-06 DIAGNOSIS — F42.9 OBSESSIVE-COMPULSIVE DISORDER, UNSPECIFIED TYPE: ICD-10-CM

## 2018-02-06 DIAGNOSIS — F31.4 BIPOLAR I DISORDER, MOST RECENT EPISODE DEPRESSED, SEVERE WITHOUT PSYCHOTIC FEATURES (HCC): Primary | ICD-10-CM

## 2018-02-06 PROCEDURE — G0177 OPPS/PHP; TRAIN & EDUC SERV: HCPCS

## 2018-02-06 PROCEDURE — 99999 PR OFFICE/OUTPT VISIT,PROCEDURE ONLY: CPT | Performed by: PSYCHIATRY & NEUROLOGY

## 2018-02-06 PROCEDURE — G0410 GRP PSYCH PARTIAL HOSP 45-50: HCPCS

## 2018-02-06 PROCEDURE — G0176 OPPS/PHP;ACTIVITY THERAPY: HCPCS

## 2018-02-06 NOTE — PSYCH
Subjective:     Patient ID: Rosette Madrigal is a 34 y o  female  [facility] Clinical Progress Notes      Specialized Services Documentation  Therapist must complete separate progress note for each specific clinical activity in which the individual participated during the day  Allied Therapy [id number] Kezia Agarwal actively shared in [facility] group focused on gaining support  She engaged in matt analysis and song re-write  Group explored the three different types of support; airtime, emotional, and problem-solving  Group discussed how to ask for a specific type of support, and support you can get besides people (professional, support groups, self-support)  Kezia Agarwal shared that she will try saying I just need a hug when she wants emotional support but is getting problem solving  Good progress towards goal shared  Discharge at end of program day  Tx Plan Objective: 1 4, 1 5 Therapist:  Verena Sena MT    Education Therapy   Time:  5405-2912  Present  Previous goal met: Yes   Readiness to Learning: Receptive  Barriers to Leaning: None  Learning Assessment  Time: 7731-1699  Present  Education Completed: Illness, Medication, Aftercare and Wellness Tools  Teaching Method: Verbal, Written and Demonstration  Shared Area of Learning: Yes   Named 5 key facets to recovery  Discharge at end of program day    Tx Plan Objective: 1 0 Therapist:  Verena Sena MT

## 2018-02-06 NOTE — PSYCH
Subjective:     Patient ID: Valencia Coker is a 34 y o  female  [facility] Clinical Progress Notes      Specialized Services Documentation  Therapist must complete separate progress note for each specific clinical activity in which the individual participated during the day  Group Psychotherapy   [id number] Dulce Calle actively shared in psychotherapy group focused on perception  Group explored development of thoughts based on perception and ways to make choices related to view we take of situations and self  She identified influences to her perception and a new understanding of changing her thoughts  She was open and able to apply concepts to herself  She was able to give examples related to her experience in treatment  Group discussed strategies to explore ways to reframe perception to helpful versus hurtful using reality testing strategies  Positive work toward goal noted  Discharge at the end of treatment day     Tx Plan Objective: 1 1, 1 5, Therapist:  Mark ISRAEL

## 2018-02-06 NOTE — PSYCH
[facility] Clinical Progress Notes      Specialized Services Documentation  Therapist must complete separate progress note for each specific clinical activity in which the individual participated during the day         [facility] follow up physician's orders:   DATE 2/6/2018  TIME 8:18 AM   DISCHARGE TODAY  Arnold Anguiano MD

## 2018-02-06 NOTE — PSYCH
Subjective:     Patient ID: Kelli Cordova is a 34 y o  female  [facility] Clinical Progress Notes      Specialized Services Documentation  Therapist must complete separate progress note for each specific clinical activity in which the individual participated during the day  Group Psychotherapy   [phone]) Trinidad Gubeatriz participated in psychotherapy group focused on gratitude and ways to cope with overwhelming stressors  Trinidad Raheem identified the collar of her t-shirt being uncomfortable as a stressor today  She actively engaged in group discussion, providing feedback and support to a peer struggling with life stressors  She referenced her own experiences as a child with her mother to support her peer in her relationship with her daughter, and talked about how she struggles in relationships today because she did not feel validated by her mother when she was younger  Trinidad Raheem noted that she rhonda with overwhelming emotions by trying to focus on crafts and has begun to utilize essential oils more  Some moderate progress noted toward goals today  Discharge at the end of program day today  Tx Plan Objective: 1 1, 1 2, Therapist:  Bel[place] MOSS      Case Management Note  [id number]  This CM met with Trinidad Maciel to review discharge instructions, relapse prevention plan and medication reconciliation list   She signed and received copies  Trinidad Maciel noted that she is looking forward to getting back to a regular routine  She also noted that in the past she was not consistent with taking her medication, but more recently has not missed any doses and has been very regimented about taking them  She will follow with Bárbara sotomayor on Friday and Dr Miguelina Crain next Tuesday  Denies SI HI and psychosis today  [place] MOSS    Current suicide risk : Low     Medications changes/added/denied? unchanged    Treatment session number: 10    Individual Case Management Visit provided today?  Yes

## 2018-02-06 NOTE — PSYCH
Subjective:     Patient ID: Sandie Castleman is a 34 y o  female  [facility] Discharge Summary:   Admission Date: 1/24/2018  Patient was referred by Milvia Garber LCSW  Discharge Date: 2/6/2018  Was this a routine discharge? yes   Diagnosis: Axis I:   1  Bipolar I disorder, most recent episode depressed, severe without psychotic features ([facility] [address] )     2  Obsessive-compulsive disorder, unspecified type        Treating Physician: Marko Rausch MD  Treatment Complications: none  Presenting Need: Depression and anxiety as evidenced by intrusive thoughts, sleep disturbances, suicidal thoughts, self-harming behaviors, and poor concentration  Course of treatment includes:    group counseling, medication management, individual case management, allied therapy, psychoeducation and psychiatric evaluation  Treatment Progress: Bailey Catherine attended 10 treatment days in [facility]  She was consistent with attendance, and gradually participated in group discussions more throughout her stay  Her treatment plan goals included skill building to help her tolerate distress and improve interpersonal relationships, as well as incorporating outside activities such as volunteer work to provide more structure and purpose to her days  Bailey Catherine found a volunteer opportunity that she will participate in on Thursday, February 8th, and notes that she plans to participate in this monthly  She also began applying for jobs at [employer] with the intention of providing more structure and purpose to her days, as she found the structure of the program beneficial   Bailey Catherine was initially very anxious daily in program, but by the end of her stay, she reported feeling less anxious and did not rely on individual case management sessions as much in order to manage her symptoms  She denies SI HI and psychosis upon discharge      Aftercare recommendations include: Return to [facility] Gar[facility] for outpatient individual and group therapy; follow with Dr Rachel Lerner for outpatient medication management      Discharge Medications include:  Current Outpatient Prescriptions:     Cholecalciferol (VITAMIN D3) 5000 units CAPS, Take 5,000 Units by mouth daily, Disp: , Rfl:     clomiPRAMINE (ANAFRANIL) 25 mg capsule, Take 25 mg by mouth every other day, Disp: , Rfl:     diclofenac sodium (VOLTAREN) 1 %, Apply 2 g topically 4 (four) times a day, Disp: 1 Tube, Rfl: 0    gabapentin (NEURONTIN) 600 MG tablet, Take 900 mg by mouth daily at bedtime, Disp: , Rfl:     HYDROcodone-acetaminophen (NORCO) 5-325 mg per tablet, Take 1 tablet by mouth 2 (two) times a day as needed for pain (severe pain), Disp: , Rfl:     lamoTRIgine (LaMICtal) 150 MG tablet, Take 2 tablets by mouth daily at bedtime for 30 days, Disp: 60 tablet, Rfl: 0    levocetirizine (XYZAL) 5 MG tablet, Take 5 mg by mouth every evening, Disp: , Rfl:     levothyroxine 100 mcg tablet, Take 1 tablet by mouth daily in the early morning for 30 days, Disp: 30 tablet, Rfl: 0    lisinopril (ZESTRIL) 10 mg tablet, Take 1 tablet by mouth daily, Disp: 30 tablet, Rfl: 0    lithium carbonate (LITHOBID) 450 mg CR tablet, Take 900 mg by mouth daily at bedtime, Disp: , Rfl:     LORazepam (ATIVAN) 1 mg tablet, Take 1 mg by mouth 3 (three) times a day as needed for anxiety, Disp: , Rfl:     lurasidone (LATUDA) 120 mg tablet, Take 1 tablet by mouth daily with dinner for 30 days, Disp: 30 tablet, Rfl: 0    Mirabegron ER 50 MG TB24, Take 1 tablet by mouth daily for 30 days, Disp: 30 tablet, Rfl: 0    Multiple Vitamin (MULTIVITAMIN) capsule, Take 1 capsule by mouth daily, Disp: , Rfl:     omeprazole (PriLOSEC) 20 mg delayed release capsule, Take 1 capsule by mouth daily in the early morning for 30 days, Disp: 30 capsule, Rfl: 0    oxybutynin (DITROPAN-XL) 10 MG 24 hr tablet, Take 10 mg by mouth daily, Disp: , Rfl:     rOPINIRole (REQUIP) 3 mg tablet, Take 1 tablet by mouth every 12 (twelve) hours for 30 days Take in a morning and at bedtime, Disp: 60 tablet, Rfl: 0    sertraline (ZOLOFT) 100 mg tablet, Take 2 tablets by mouth daily for 30 days, Disp: 60 tablet, Rfl: 0

## 2018-02-09 ENCOUNTER — OFFICE VISIT (OUTPATIENT)
Dept: BEHAVIORAL/MENTAL HEALTH CLINIC | Facility: CLINIC | Age: 33
End: 2018-02-09
Payer: MEDICARE

## 2018-02-09 DIAGNOSIS — F50.81 BINGE-EATING DISORDER, SEVERE: ICD-10-CM

## 2018-02-09 DIAGNOSIS — F42.2 MIXED OBSESSIONAL THOUGHTS AND ACTS: ICD-10-CM

## 2018-02-09 DIAGNOSIS — F63.0 GAMBLING DISORDER, MODERATE: ICD-10-CM

## 2018-02-09 DIAGNOSIS — F31.4 SEVERE BIPOLAR I DISORDER, MOST RECENT EPISODE DEPRESSED (HCC): Primary | ICD-10-CM

## 2018-02-09 PROCEDURE — 90834 PSYTX W PT 45 MINUTES: CPT | Performed by: SOCIAL WORKER

## 2018-02-12 NOTE — PSYCH
Progress Note  Psychotherapy Provided [facility]: Group Therapy provided today  Goals addressed in session:   1 D: Above individual attended Trauma Group  Members engaged in discussion and activity re: soothing rhythm breathing and compassionate color imagery  A: Elizabeth attempted to focus and participate in today's activity discussion while also sharing her plans to begin the [facility] Program later this week despite her anxiety about doing so  P: She will continue to be seen for individual and group therapy during which the above will be further processed  Pain Scale and Suicide Risk [facility]: Current Pain Assessment: no pain   Behavioral Health Treatment Plan [facility]: Diagnosis and Treatment Plan explained to patient, patient relates understanding diagnosis and is agreeable to Treatment Plan  Assessment    1   PTSD (post-traumatic stress disorder) (309 63) (F43 10)    Signatures   Electronically signed by : Robi Serrano LCSW; Jan 26 2018 10:56AM EST                       (Author)

## 2018-02-13 ENCOUNTER — OFFICE VISIT (OUTPATIENT)
Dept: BEHAVIORAL/MENTAL HEALTH CLINIC | Facility: CLINIC | Age: 33
End: 2018-02-13
Payer: MEDICARE

## 2018-02-13 DIAGNOSIS — F50.81 BINGE-EATING DISORDER, SEVERE: ICD-10-CM

## 2018-02-13 PROCEDURE — 90853 GROUP PSYCHOTHERAPY: CPT | Performed by: SOCIAL WORKER

## 2018-02-14 ENCOUNTER — OFFICE VISIT (OUTPATIENT)
Dept: UROLOGY | Facility: AMBULATORY SURGERY CENTER | Age: 33
End: 2018-02-14
Payer: MEDICARE

## 2018-02-14 VITALS
BODY MASS INDEX: 57.64 KG/M2 | WEIGHT: 293 LBS | DIASTOLIC BLOOD PRESSURE: 70 MMHG | SYSTOLIC BLOOD PRESSURE: 128 MMHG | HEART RATE: 68 BPM

## 2018-02-14 DIAGNOSIS — R39.15 URINARY URGENCY: ICD-10-CM

## 2018-02-14 DIAGNOSIS — R35.0 URINARY FREQUENCY: ICD-10-CM

## 2018-02-14 PROBLEM — F31.4 SEVERE BIPOLAR I DISORDER, MOST RECENT EPISODE DEPRESSED (HCC): Status: ACTIVE | Noted: 2017-05-25

## 2018-02-14 PROCEDURE — 64566 NEUROELTRD STIM POST TIBIAL: CPT

## 2018-02-14 NOTE — PSYCH
Psychotherapy Provided: Group Therapy     Length of time in session: 50 minutes, follow up in 2 week    Goals addressed in session: Goal 1     Pain:      none    0    Current suicide risk : Low     1 D:  The above was seen for Group Therapy session  Group members engaged in an activity / discussion re: what it means to have sustained their recovery of Eating Disordered behavior  A:  Elizabeth actively participated in this activity  She appeared to enjoy the camaraderie of the group as she has not been able to attend in several weeks  P: She will be encouraged to continue to attend individual therapy weekly and the Adult ED group on a biweekly basis  Behavioral Health Treatment Plan [facility]: Diagnosis and Treatment Plan explained to Anais Thomas relates understanding diagnosis and is agreeable to Treatment Plan   Yes

## 2018-02-14 NOTE — PSYCH
Psychotherapy Provided: Individual Psychotherapy 50 minutes     Length of time in session: 50 minutes, follow up in 1 week    Goals addressed in session: Goal 1, Goal 2 and Goal 3      Pain:      none    0    Current suicide risk : Low     D: Met with Elizabeth for Individual Therapy session  This worker spoke with Constanza Larsen  today about boundaries and rules for continuing to work with this therapist following her repeated attempts to have  Contact in between sessions and while in [facility]  A: Elizabeth acknowledged boundaiy violations while attempting to explain that this was due to feeling that her OCD was out of control  She was provided with rules and expectations for continuing therapy  P: Upcoming sessions will be used to continue to address the above  She will continue to attend both weekly Trauma and ED group therapy sessions  Behavioral Health Treatment Plan [facility]: Diagnosis and Treatment Plan explained to Ailyn Carter relates understanding diagnosis and is agreeable to Treatment Plan   Yes

## 2018-02-14 NOTE — PROGRESS NOTES
2018    Yamilex Heart  1985  [id number]    Diagnosis  Urinary frequency    Plan  Once a month          Procedure PTNS  Session     Ankle used: right  Treatment settin  Duration of treatment: 30 minutes  Lead lot #:[id number]  Expiration Date:2019  Feeling/Response: felt hat insertion site and big toe moved    Patient Goals and Progress  Decreased Urgency YES  Decreased Frequency yes  Episodes of incontinence no  Sleep Through Night no    Nighttime voids: 2x better than her 6-8 times      Vitals:    18 1058   BP: 128/70   Pulse: 68    Patient doesn't have weight taken per her psychologist     Current Outpatient Prescriptions on File Prior to Visit   Medication Sig Dispense Refill    Cholecalciferol (VITAMIN D3) 5000 units CAPS Take 5,000 Units by mouth daily      clomiPRAMINE (ANAFRANIL) 25 mg capsule Take 25 mg by mouth every other day      diclofenac sodium (VOLTAREN) 1 % Apply 2 g topically 4 (four) times a day 1 Tube 0    gabapentin (NEURONTIN) 600 MG tablet Take 900 mg by mouth daily at bedtime      HYDROcodone-acetaminophen (NORCO) 5-325 mg per tablet Take 1 tablet by mouth 2 (two) times a day as needed for pain (severe pain)      levocetirizine (XYZAL) 5 MG tablet Take 5 mg by mouth every evening      lisinopril (ZESTRIL) 10 mg tablet Take 1 tablet by mouth daily 30 tablet 0    lithium carbonate (LITHOBID) 450 mg CR tablet Take 900 mg by mouth daily at bedtime      LORazepam (ATIVAN) 1 mg tablet Take 1 mg by mouth 3 (three) times a day as needed for anxiety      Multiple Vitamin (MULTIVITAMIN) capsule Take 1 capsule by mouth daily      oxybutynin (DITROPAN-XL) 10 MG 24 hr tablet Take 10 mg by mouth daily      lamoTRIgine (LaMICtal) 150 MG tablet Take 2 tablets by mouth daily at bedtime for 30 days 60 tablet 0    levothyroxine 100 mcg tablet Take 1 tablet by mouth daily in the early morning for 30 days 30 tablet 0    lurasidone (LATUDA) 120 mg tablet Take 1 tablet by mouth daily with dinner for 30 days 30 tablet 0    Mirabegron ER 50 MG TB24 Take 1 tablet by mouth daily for 30 days 30 tablet 0    omeprazole (PriLOSEC) 20 mg delayed release capsule Take 1 capsule by mouth daily in the early morning for 30 days 30 capsule 0    rOPINIRole (REQUIP) 3 mg tablet Take 1 tablet by mouth every 12 (twelve) hours for 30 days Take in a morning and at bedtime 60 tablet 0    sertraline (ZOLOFT) 100 mg tablet Take 2 tablets by mouth daily for 30 days 60 tablet 0     No current facility-administered medications on file prior to visit                          Yessenia Faria RN

## 2018-02-16 ENCOUNTER — OFFICE VISIT (OUTPATIENT)
Dept: BEHAVIORAL/MENTAL HEALTH CLINIC | Facility: CLINIC | Age: 33
End: 2018-02-16
Payer: MEDICARE

## 2018-02-16 DIAGNOSIS — F42.2 MIXED OBSESSIONAL THOUGHTS AND ACTS: ICD-10-CM

## 2018-02-16 DIAGNOSIS — F50.81 BINGE-EATING DISORDER, SEVERE: ICD-10-CM

## 2018-02-16 DIAGNOSIS — F63.0 GAMBLING DISORDER, MODERATE: ICD-10-CM

## 2018-02-16 DIAGNOSIS — F31.4 BIPOLAR I DISORDER, MOST RECENT EPISODE DEPRESSED, SEVERE WITHOUT PSYCHOTIC FEATURES (HCC): Primary | ICD-10-CM

## 2018-02-16 PROCEDURE — 90834 PSYTX W PT 45 MINUTES: CPT | Performed by: SOCIAL WORKER

## 2018-02-16 NOTE — PSYCH
Psychotherapy Provided: Individual Psychotherapy 50 minutes     Length of time in session: 50 minutes, follow up in 1 week    Goals addressed in session: Goal 1, Goal 2 and Goal 3      Pain:      none    0    Current suicide risk : Low     D: Met with Elizabeth for Individual Therapy session  Elizabeth spoke with this worker today about concerns she has written down since her last session  She once again apologized for having violated boundaries and agreed to following the rules for continuing to work with this therapist     A: Elizabeth worked diligently today to update her Treatment Plan goals She also accepted this worker's feedback without difficulty   P: Upcoming sessions will be used to continue to address the above  She will continue to attend both weekly Trauma and ED group therapy sessions  Behavioral Health Treatment Plan [facility]: Diagnosis and Treatment Plan explained to Lelia Carlos relates understanding diagnosis and is agreeable to Treatment Plan   Yes

## 2018-02-16 NOTE — PSYCH
Date of Initial Treatment Plan: /31/20087  Date of Current Treatment Plan: 02/16/18    Treatment Plan Number 20     Strengths/Personal Resources for Self Care: Determined, intelligent, gets along well with others    Diagnosis:   1  Bipolar I disorder, most recent episode depressed, severe without psychotic features ([facility] [address] )     2  Mixed obsessional thoughts and acts     3  Gambling disorder, moderate     4  Binge-eating disorder, severe         Area of Needs: I need to distance myself from my parents  I need to continue to manage my OCD  I need to work on my Ed  Long Term Goal 1: AI want to be independent  Target Date:6/16/18  Completion Date: n/a         Short Term Objectives for Goal 1: AI will work on finding a part time job, BI will find healthy behaviors other than online shopping and CI will go to unknown places despite fears or excuses    Long Term Goal 2: I want to continue to challenge my irrational and illogical thoughts       Target Date: 6/16/18  Completion Date: N/A    Short Term Objectives for Goal 2: AI will use the [employer] petra and BI will follow the therapy rules         Long Term Goal # 3: I want to accept myself as I am proud of     Target Date: 6/16/18  Completion Date: N/A    Short Term Objectives for Goal 3: AI will tell myself that I am more than a number on the scale and BI will limit the gifts I give others as I learn about relatonships and transference    GOAL 1: Modality: Individual 4x per month   Completion Date n/a, Group and The person(s) responsible for carrying out the plan is  [facility]    GOAL 2: Modality: Individual 6/16/18x per month   Completion Date n/a, Group and The person(s) responsible for carrying out the plan is  [facility]     GOAL 3: Modality: Individual 6/16/18x per month   Completion Date n/a, Group and The person(s) responsible for carrying out the plan is  [employer]: Diagnosis and Treatment Plan explained to Deysi Cunningham relates understanding diagnosis and is agreeable to Treatment Plan         Client Comments : Please share your thoughts, feelings, need and/or experiences regarding your treatment plan:       __________________________________________________________________    __________________________________________________________________    __________________________________________________________________    __________________________________________________________________    _______________________________________                Patient signature, Date Time: __________________________________________             Physician cosigner signature, Date, Time: ________________________________

## 2018-02-19 ENCOUNTER — OFFICE VISIT (OUTPATIENT)
Dept: BEHAVIORAL/MENTAL HEALTH CLINIC | Facility: CLINIC | Age: 33
End: 2018-02-19
Payer: MEDICARE

## 2018-02-19 ENCOUNTER — OFFICE VISIT (OUTPATIENT)
Dept: URGENT CARE | Age: 33
End: 2018-02-19
Payer: MEDICARE

## 2018-02-19 VITALS
WEIGHT: 293 LBS | HEIGHT: 66 IN | OXYGEN SATURATION: 99 % | BODY MASS INDEX: 47.09 KG/M2 | RESPIRATION RATE: 20 BRPM | HEART RATE: 70 BPM | TEMPERATURE: 98.8 F | SYSTOLIC BLOOD PRESSURE: 132 MMHG | DIASTOLIC BLOOD PRESSURE: 70 MMHG

## 2018-02-19 DIAGNOSIS — F31.4 SEVERE BIPOLAR I DISORDER, MOST RECENT EPISODE DEPRESSED (HCC): Primary | ICD-10-CM

## 2018-02-19 DIAGNOSIS — R68.89 FLU-LIKE SYMPTOMS: Primary | ICD-10-CM

## 2018-02-19 DIAGNOSIS — F42.2 MIXED OBSESSIONAL THOUGHTS AND ACTS: ICD-10-CM

## 2018-02-19 DIAGNOSIS — F50.81 BINGE-EATING DISORDER, SEVERE: ICD-10-CM

## 2018-02-19 DIAGNOSIS — F63.0 GAMBLING DISORDER, MODERATE: ICD-10-CM

## 2018-02-19 PROCEDURE — G0463 HOSPITAL OUTPT CLINIC VISIT: HCPCS | Performed by: PREVENTIVE MEDICINE

## 2018-02-19 PROCEDURE — 90834 PSYTX W PT 45 MINUTES: CPT | Performed by: SOCIAL WORKER

## 2018-02-19 PROCEDURE — 87798 DETECT AGENT NOS DNA AMP: CPT | Performed by: PHYSICIAN ASSISTANT

## 2018-02-19 PROCEDURE — 99213 OFFICE O/P EST LOW 20 MIN: CPT | Performed by: PREVENTIVE MEDICINE

## 2018-02-19 RX ORDER — OSELTAMIVIR PHOSPHATE 75 MG/1
75 CAPSULE ORAL EVERY 12 HOURS SCHEDULED
Qty: 10 CAPSULE | Refills: 0 | Status: SHIPPED | OUTPATIENT
Start: 2018-02-19 | End: 2018-02-24

## 2018-02-19 NOTE — PATIENT INSTRUCTIONS
Push fluids/rest  Tylenol or motrin for fevers or body aches  Call us tomorrow for flu results- begin tamiflu if positive  Recheck with your PCP in 2-3 days  ER if worsening  Influenza   AMBULATORY CARE:   Influenza  (the flu) is an infection caused by the influenza virus  The flu is easily spread when an infected person coughs, sneezes, or has close contact with others  You may be able to spread the flu to others for 1 week or longer after signs or symptoms appear  Common signs and symptoms include the following:   · Fever and chills    · Headaches, body aches, and muscle or joint pain    · Cough, runny nose, and sore throat    · Loss of appetite, nausea, vomiting, or diarrhea    · Tiredness    · Trouble breathing  Call 911 for any of the following:   · You have trouble breathing, and your lips look purple or blue  · You have a seizure  Seek care immediately if:   · You are dizzy, or you are urinating less or not at all  · You have a headache with a stiff neck, and you feel tired or confused  · You have new pain or pressure in your chest     · Your symptoms, such as shortness of breath, vomiting, or diarrhea, get worse  · Your symptoms, such as fever and coughing, seem to get better, but then get worse  Contact your healthcare provider if:   · You have new muscle pain or weakness  · You have questions or concerns about your condition or care  Treatment for influenza  may include any of the following:  · Acetaminophen  decreases pain and fever  It is available without a doctor's order  Ask how much to take and how often to take it  Follow directions  Acetaminophen can cause liver damage if not taken correctly  · NSAIDs , such as ibuprofen, help decrease swelling, pain, and fever  This medicine is available with or without a doctor's order  NSAIDs can cause stomach bleeding or kidney problems in certain people   If you take blood thinner medicine, always ask your healthcare provider if NSAIDs are safe for you  Always read the medicine label and follow directions  · Antivirals  help fight a viral infection  Manage your symptoms:   · Rest  as much as you can to help you recover  · Drink liquids as directed  to help prevent dehydration  Ask how much liquid to drink each day and which liquids are best for you  Prevent the spread of the flu:   · Wash your hands often  Use soap and water  Wash your hands after you use the bathroom, change a child's diapers, or sneeze  Wash your hands before you prepare or eat food  Use gel hand cleanser when soap and water are not available  Do not touch your eyes, nose, or mouth unless you have washed your hands first            · Cover your mouth when you sneeze or cough  Cough into a tissue or the bend of your arm  · Clean shared items with a germ-killing   Clean table surfaces, doorknobs, and light switches  Do not share towels, silverware, and dishes with people who are sick  Wash bed sheets, towels, silverware, and dishes with soap and water  · Wear a mask  over your mouth and nose if you are sick or are near anyone who is sick  · Stay away from others  if you are sick  · Influenza vaccine  helps prevent influenza (flu)  Everyone older than 6 months should get a yearly influenza vaccine  Get the vaccine as soon as it is available, usually in September or October each year  Follow up with your healthcare provider as directed:  Write down your questions so you remember to ask them during your visits  © 2017 [address] [employer] Information is for End User's use only and may not be sold, redistributed or otherwise used for commercial purposes  All illustrations and images included in CareNotes® are the copyrighted property of A D A "[employer] , [employer]  or Ceasar Alcaraz  The above information is an  only  It is not intended as medical advice for individual conditions or treatments   Talk to your doctor, nurse or pharmacist before following any medical regimen to see if it is safe and effective for you

## 2018-02-19 NOTE — PSYCH
Psychotherapy Provided: Individual Psychotherapy 50 minutes     Length of time in session: 50 minutes, follow up in 1 week    Goals addressed in session: Goal 1, Goal 2 and Goal 3      Pain:      none    0    Current suicide risk : Low     D: Met with Elizabeth for Individual Therapy session  Elizabeth spoke with this worker today about additional concerns she has written down since her last session  She once again apologized for having violated boundaries but became overwhelmed and tearful at the end of the session  A: Elizabeth stated that she had been "starting to be honest" in her communications with this worker and now does not feel as if she is (able) to be as honest as she is "not talking about (all) of her issues " More personality issues are becoming apparent in Elizabeth's therapy as boundaries are set // maintained by this worker  P: Upcoming sessions will be used to continue to address the above  She will continue to attend both weekly Trauma and ED group therapy sessions  Behavioral Health Treatment Plan [facility]: Diagnosis and Treatment Plan explained to Nataliia Aiken relates understanding diagnosis and is agreeable to Treatment Plan   Yes

## 2018-02-19 NOTE — PROGRESS NOTES
[facility]        NAME: Rodrigo Fisher is a 34 y o  female  : 1985    MRN: [id number]  DATE: 2018  TIME: 11:00 AM    Assessment and Plan   Flu-like symptoms [R68 89]  1  Flu-like symptoms  Influenza A/B and RSV by PCR (Indicated for patients > 2 mo of age)    oseltamivir (TAMIFLU) 75 mg capsule         Patient Instructions     Push fluids/rest  Supportive care  Flu testing performed  Follow up with PCP in 3-5 days  Proceed to  ER if symptoms worsen  Side effects discussed in detail regarding tamiflu  rx given, patient states she will read more about it then decide whether or not to take if flu testing is positive  Given psych history, discussed that supportive care might be better treatment for her  Chief Complaint     Chief Complaint   Patient presents with    Fever     Not feeling well since yesterday- hot/cold and generalized body aches and chest congestion without cough    Chills    Generalized Body Aches         History of Present Illness       42-year-old female presents with complaints of sudden-onset body aches, chills, sweats chest congestion  No fever  No other URI  symptoms  She did receive flu vaccine this year  She took ibuprofen last night  Review of Systems   Review of Systems   Constitutional: Positive for chills and fatigue  Negative for fever  HENT: Negative  Respiratory: Negative for cough  Cardiovascular: Negative for chest pain  All other systems reviewed and are negative          Current Medications     Long-Term Prescriptions   Medication Sig Dispense Refill    Cholecalciferol (VITAMIN D3) 5000 units CAPS Take 5,000 Units by mouth daily      clomiPRAMINE (ANAFRANIL) 25 mg capsule Take 25 mg by mouth every other day      diclofenac sodium (VOLTAREN) 1 % Apply 2 g topically 4 (four) times a day 1 Tube 0    lamoTRIgine (LaMICtal) 150 MG tablet Take 2 tablets by mouth daily at bedtime for 30 days 60 tablet 0    levocetirizine (XYZAL) 5 MG tablet Take 5 mg by mouth every evening      levothyroxine 100 mcg tablet Take 1 tablet by mouth daily in the early morning for 30 days 30 tablet 0    lisinopril (ZESTRIL) 10 mg tablet Take 1 tablet by mouth daily 30 tablet 0    lurasidone (LATUDA) 120 mg tablet Take 1 tablet by mouth daily with dinner for 30 days 30 tablet 0    Mirabegron ER 50 MG TB24 Take 1 tablet by mouth daily for 30 days 30 tablet 0    Multiple Vitamin (MULTIVITAMIN) capsule Take 1 capsule by mouth daily      omeprazole (PriLOSEC) 20 mg delayed release capsule Take 1 capsule by mouth daily in the early morning for 30 days 30 capsule 0    oxybutynin (DITROPAN-XL) 10 MG 24 hr tablet Take 10 mg by mouth daily      rOPINIRole (REQUIP) 3 mg tablet Take 1 tablet by mouth every 12 (twelve) hours for 30 days Take in a morning and at bedtime 60 tablet 0    sertraline (ZOLOFT) 100 mg tablet Take 2 tablets by mouth daily for 30 days 60 tablet 0       Current Allergies     Allergies as of 02/19/2018    (No Known Allergies)            The following portions of the patient's history were reviewed and updated as appropriate: allergies, current medications, past family history, past medical history, past social history, past surgical history and problem list     Objective   /70 (BP Location: Left arm, Patient Position: Sitting, Cuff Size: Standard)   Pulse 70   Temp 98 8 °F (37 1 °C) (Temporal)   Resp 20   Ht 5' 6" (1 676 m)   Wt (!) 163 kg (360 lb)   LMP 02/05/2018   SpO2 99%   BMI 58 11 kg/m²        Physical Exam     Physical Exam   Constitutional: She is oriented to person, place, and time  She appears well-developed and well-nourished  HENT:   Head: Normocephalic and atraumatic  Right Ear: External ear normal    Left Ear: External ear normal    Mouth/Throat: Oropharynx is clear and moist    Neck: Neck supple  Cardiovascular: Normal rate and regular rhythm      Pulmonary/Chest: Effort normal and breath sounds normal  Lymphadenopathy:     She has no cervical adenopathy  Neurological: She is alert and oriented to person, place, and time  Psychiatric: She has a normal mood and affect  Her behavior is normal  Thought content normal    Nursing note and vitals reviewed

## 2018-02-20 ENCOUNTER — TELEPHONE (OUTPATIENT)
Dept: URGENT CARE | Age: 33
End: 2018-02-20

## 2018-02-20 ENCOUNTER — OFFICE VISIT (OUTPATIENT)
Dept: BEHAVIORAL/MENTAL HEALTH CLINIC | Facility: CLINIC | Age: 33
End: 2018-02-20
Payer: MEDICARE

## 2018-02-20 DIAGNOSIS — F42.2 MIXED OBSESSIONAL THOUGHTS AND ACTS: ICD-10-CM

## 2018-02-20 DIAGNOSIS — F50.81 BINGE-EATING DISORDER, SEVERE: Primary | ICD-10-CM

## 2018-02-20 LAB
FLUAV AG SPEC QL: NORMAL
FLUBV AG SPEC QL: NORMAL
RSV B RNA SPEC QL NAA+PROBE: NORMAL

## 2018-02-20 PROCEDURE — 90853 GROUP PSYCHOTHERAPY: CPT | Performed by: SOCIAL WORKER

## 2018-02-21 NOTE — PSYCH
Psychotherapy Provided: Group Therapy     Length of time in session: 50 minutes, follow up in 2 week    Goals addressed in session: Goal 1     Pain:      none    0    Current suicide risk : Low     1 D:  The above was seen for Group Therapy session  Group members engaged in an activity / discussion re: the 4  components of compassion towards self  A:  Elizabeth actively participated in this activity  She appeared to enjoy the discussion and activity and stated she is willing to work on this  P: She will be encouraged to continue to attend individual therapy weekly and the Adult Trauma group on a biweekly basis  Behavioral Health Treatment Plan [facility]: Diagnosis and Treatment Plan explained to Lelia Carlos relates understanding diagnosis and is agreeable to Treatment Plan   Yes

## 2018-02-26 ENCOUNTER — OFFICE VISIT (OUTPATIENT)
Dept: GYNECOLOGY | Facility: CLINIC | Age: 33
End: 2018-02-26
Payer: MEDICARE

## 2018-02-26 VITALS
DIASTOLIC BLOOD PRESSURE: 80 MMHG | HEIGHT: 67 IN | HEART RATE: 64 BPM | BODY MASS INDEX: 45.99 KG/M2 | WEIGHT: 293 LBS | SYSTOLIC BLOOD PRESSURE: 138 MMHG

## 2018-02-26 DIAGNOSIS — N81.89 VAGINAL RELAXATION: ICD-10-CM

## 2018-02-26 DIAGNOSIS — Z01.411 ENCNTR FOR GYN EXAM (GENERAL) (ROUTINE) W ABNORMAL FINDINGS: Primary | ICD-10-CM

## 2018-02-26 DIAGNOSIS — Z11.3 ROUTINE SCREENING FOR STI (SEXUALLY TRANSMITTED INFECTION): ICD-10-CM

## 2018-02-26 PROCEDURE — 87591 N.GONORRHOEAE DNA AMP PROB: CPT | Performed by: NURSE PRACTITIONER

## 2018-02-26 PROCEDURE — 87491 CHLMYD TRACH DNA AMP PROBE: CPT | Performed by: NURSE PRACTITIONER

## 2018-02-26 PROCEDURE — G0101 CA SCREEN;PELVIC/BREAST EXAM: HCPCS | Performed by: NURSE PRACTITIONER

## 2018-02-26 PROCEDURE — G0145 SCR C/V CYTO,THINLAYER,RESCR: HCPCS | Performed by: NURSE PRACTITIONER

## 2018-02-26 PROCEDURE — 87624 HPV HI-RISK TYP POOLED RSLT: CPT | Performed by: NURSE PRACTITIONER

## 2018-02-26 NOTE — PATIENT INSTRUCTIONS
Pap every 3 year if normal-done, STI testing as indicated-GC/CT, exercise most days of week, appropriate diet and hydration, Calcium 1000mg + 600 vit D daily  Annual mammogram starting at age 35, monthly BSE  Kegels 20 times twice daily  Referral to PF Pt

## 2018-02-26 NOTE — PSYCH
Message  Message Free Text Note Form: Per Dr Dipika Giang, she is no longer meeting criteria  She will pass on my concerns to him  She is not interested in it  She is aware that [facility] has my email  She will talk to her about no more contact in this fashion, recommendation for PHP, stress of living situ  Will keep in touch  Active Problems    1  Abnormal weight gain (783 1) (R63 5)   2  Allergic rhinitis (477 9) (J30 9)   3  Amenorrhea (626 0) (N91 2)   4  Binge-eating disorder, severe (783 6) (F50 81)   5  Bulging lumbar disc (722 10) (M51 26)   6  Dysuria (788 1) (R30 0)   7  Esophageal reflux (530 81) (K21 9)   8  Foot pain (729 5) (M79 673)   9  Fracture of left toe with routine healing (V54 19) (S92 912D)   10  EILEEN (generalized anxiety disorder) (300 02) (F41 1)   11  Gambling disorder, moderate (312 31) (F63 0)   12  Hypertension (401 9) (I10)   13  Hypothyroidism (244 9) (E03 9)   14  Left foot infection (686 9) (L08 9)   15  Long term current use of therapeutic drug (V58 69) (Z79 899)   16  Lumbago (724 2) (M54 5)   17  Lumbar degenerative disc disease (722 52) (M51 36)   18  Morbid or severe obesity due to excess calories (278 01) (E66 01)   19  Need for immunization against influenza (V04 81) (Z23)   20  Nocturnal enuresis (788 36) (N39 44)   21  Obsessive-compulsive disorder (300 3) (F42 9)   22  Other abnormal glucose (790 29) (R73 09)   23  Overactive bladder (596 51) (N32 81)   24  PTSD (post-traumatic stress disorder) (309 81) (F43 10)   25  RLS (restless legs syndrome) (333 94) (G25 81)   26  Severe bipolar I disorder, most recent episode depressed (296 53) (F31 4)   27  Urgency incontinence (788 31) (N39 41)   28  Urinary incontinence, unspecified type (788 30) (R32)   29  Urinary tract infection, site unspecified (599 0) (N39 0)   30  Vitamin D deficiency (268 9) (E55 9)    Current Meds   1  HydrOXYzine HCl - 50 MG Oral Tablet; TAKE 1 1/2 TO 2 TABLETS AT BEDTIME AS   NEEDED FOR INSOMNIA;    Therapy: 23SIG5116 to (Evaluate:94Bqr3467)  Requested for: 04COG5395; Last   Rx:13Jun2017 Ordered   2  LamoTRIgine 200 MG Oral Tablet; TAKE 2 TABLETS AT BEDTIME; Therapy: 01ONX5599 to ([phone] [id number])  Requested for: 79Hqf2035; Last   Rx:66Xry4433 Ordered   3  Latuda 120 MG Oral Tablet; TAKE 1 TABLET IN THE EVENING WITH FOOD; Therapy: 15Hkr1647 to ()  Requested for: 04CBQ3532; Last   Rx:58Wfv6796 Ordered   4  Levocetirizine Dihydrochloride 5 MG Oral Tablet; TAKE 1 TABLET BY MOUTH ONCE A   DAY; Therapy: 16Bnz4477 to (Evaluate:15Jan2018)  Requested for: 26TPB0675; Last   Rx:16Nov2017 Ordered   5  Levothyroxine Sodium 100 MCG Oral Tablet (Synthroid); take 1 tablet by mouth every day; Therapy: 03VAT3611 to (Shayy Styles)  Requested for: 97Msg3995; Last   Rx:11Vrj8301 Ordered   6  Lisinopril 10 MG Oral Tablet; TAKE 1 TABLET BY MOUTH EVERY DAY FOR BLOOD   PRESSURE; Therapy: 27HBY9581 to [phone])  Requested for: 63Khm4143; Last   Rx:66Mjb7652 Ordered   7  Lithium Carbonate  MG Oral Tablet Extended Release; take 2 tablets at bedtime; Therapy: 53AZV7996 to (Evaluate:09Jan2018)  Requested for: 33Tag9862; Last   Rx:57Pmf1130 Ordered   8  LORazepam 0 5 MG Oral Tablet; Take 1 tablet 3 times daily  as needed; Therapy: 65Lcw1360 to (Evaluate:10Nov2017); Last Rx:16Lsl3763 Ordered   9  Myrbetriq 50 MG Oral Tablet Extended Release 24 Hour; Take one tablet daily; Therapy: 53AXY4547 to (Last Rx:04Ful5228)  Requested for: 42Mqs6591 Ordered   10  Neurontin 600 MG Oral Tablet (Gabapentin); TAKE1 5 TABS DAILY; Therapy: (Recorded:30Oct2017) to Recorded   11  Omeprazole 20 MG Oral Capsule Delayed Release; take 1 capsule daily as needed; Therapy: 27WTR7517 to (Ady Huerta)  Requested for: 29CRW3670; Last    Rx:80Kmx0740 Ordered   12  ROPINIRole HCl - 3 MG Oral Tablet; take 1 tablet by mouth twice a day;     Therapy: 98Fmi5397 to [phone])  Requested for: 15JNO9172; Last    Rx:77Nqy7752 Ordered   13  Sertraline HCl - 100 MG Oral Tablet; TAKE 2 TABLET Every morning; Therapy: (Recorded:30Oct2017) to Recorded    Allergies    1  No Known Drug Allergies    2   Seasonal    Signatures   Electronically signed by : Harper Norton LCSW; Jan 11 2018  8:25AM EST                       (Author)

## 2018-02-26 NOTE — PROGRESS NOTES
Assessment/Plan:  Pap every 3 year if normal-done, STI testing as indicated-GC/CT, exercise most days of week, appropriate diet and hydration, Calcium 1000mg + 600 vit D daily  Annual mammogram starting at age 35, monthly BSE  Kegels 20 times twice daily  Referral to PF Pt  Diagnoses and all orders for this visit:    Encntr for gyn exam (general) (routine) w abnormal findings  -     Liquid-based pap, screening    Vaginal relaxation  -     Ambulatory referral to Physical Therapy; Future    Routine screening for STI (sexually transmitted infection)  -     Chlamydia/GC amplified DNA by PCR    Other orders  -     Lurasidone HCl (LATUDA PO); Take by mouth          Subjective:      Patient ID: Megan Nava is a 34 y o  female  Patient here for annual exam  States she had her last annual exam 2 years ago  No abnormal pap history    LMP 1/15/18  Menses Q 45 days x 3-5 days with light flow  Denies any breast concerns  Not currently sexually active  Last coitus 2 years ago  No STI testing since  Hx of overactive bladder  Using PTNS once monthly x 2 years  Worse time is mid night  Never evaluated by PF Pt  Not currently exercising  Walks dog (flores retriever) most days  Hx bipolar disorder, OCD, HTN, hypothyroidism  "Mental health issues are not stable " Hospitalized in January for med change  OCD-cleaning, organizing and rethinking things  Gynecologic Exam   The patient's pertinent negatives include no pelvic pain or vaginal discharge  Associated symptoms include urgency  Pertinent negatives include no abdominal pain, back pain, chills, constipation, diarrhea, dysuria, fever, frequency, headaches, hematuria, nausea, sore throat or vomiting         The following portions of the patient's history were reviewed and updated as appropriate: allergies, current medications, past family history, past medical history, past social history, past surgical history and problem list     Review of Systems Constitutional: Negative  Negative for activity change, appetite change, chills, diaphoresis, fatigue, fever and unexpected weight change  HENT: Negative for congestion, dental problem, sneezing, sore throat and trouble swallowing  Eyes: Negative for visual disturbance  Respiratory: Negative for chest tightness and shortness of breath  Cardiovascular: Negative for chest pain and leg swelling  Gastrointestinal: Negative for abdominal pain, constipation, diarrhea, nausea and vomiting  Genitourinary: Positive for urgency  Negative for difficulty urinating, dyspareunia, dysuria, frequency, hematuria, menstrual problem, pelvic pain, vaginal bleeding, vaginal discharge and vaginal pain  Musculoskeletal: Negative for back pain and neck pain  Skin: Negative  Allergic/Immunologic: Negative  Neurological: Negative for weakness and headaches  Hematological: Negative for adenopathy  Psychiatric/Behavioral: Negative  Objective:      /80 (BP Location: Left arm, Patient Position: Sitting)   Pulse 64   Ht 5' 7" (1 702 m)   Wt (!) 167 kg (368 lb 3 2 oz)   LMP 01/20/2018   BMI 57 67 kg/m²          Physical Exam   Constitutional: She is oriented to person, place, and time  She appears well-developed and well-nourished  PE limited by habitus   HENT:   Head: Normocephalic and atraumatic  Eyes: Right eye exhibits no discharge  Left eye exhibits no discharge  Neck: Normal range of motion  Neck supple  Cardiovascular: Normal rate, regular rhythm, normal heart sounds and intact distal pulses  Pulmonary/Chest: Effort normal and breath sounds normal    Abdominal: Soft  Genitourinary: Vagina normal and uterus normal  Rectal exam shows no external hemorrhoid  No breast swelling, tenderness, discharge or bleeding  No labial fusion  There is no rash, tenderness, lesion or injury on the right labia  There is no rash, tenderness, lesion or injury on the left labia   Cervix exhibits no motion tenderness, no discharge and no friability  Right adnexum displays no mass, no tenderness and no fullness  Left adnexum displays no mass, no tenderness and no fullness  No erythema, tenderness or bleeding in the vagina  No foreign body in the vagina  No signs of injury around the vagina  No vaginal discharge found  Genitourinary Comments: Unable to perform kegel   Musculoskeletal: Normal range of motion  Lymphadenopathy:     She has no cervical adenopathy  Right: No inguinal adenopathy present  Left: No inguinal adenopathy present  Neurological: She is alert and oriented to person, place, and time  Skin: Skin is warm and dry  Psychiatric: She has a normal mood and affect  Nursing note and vitals reviewed

## 2018-02-27 ENCOUNTER — OFFICE VISIT (OUTPATIENT)
Dept: BEHAVIORAL/MENTAL HEALTH CLINIC | Facility: CLINIC | Age: 33
End: 2018-02-27
Payer: MEDICARE

## 2018-02-27 DIAGNOSIS — F50.81 BINGE-EATING DISORDER, SEVERE: Primary | ICD-10-CM

## 2018-02-27 PROCEDURE — 90853 GROUP PSYCHOTHERAPY: CPT | Performed by: SOCIAL WORKER

## 2018-02-28 NOTE — PSYCH
Psychotherapy Provided: Group Therapy     Length of time in session: 50 minutes, follow up in 2 week    Goals addressed in session: Goal 1     Pain:      none    0    Current suicide risk : Low     1 D:  The above was seen for Group Therapy session  Group members engaged in an activity / discussion re: what it means to have sustained their recovery of Eating Disordered behavior  A:  Elizabeth was open in sharing her recent streggles with group members today  She actively participated in this activity  P: She will be encouraged to continue to attend individual therapy weekly and the Adult ED group on a biweekly basis  Behavioral Health Treatment Plan [facility]: Diagnosis and Treatment Plan explained to Fiordaliza Albert relates understanding diagnosis and is agreeable to Treatment Plan   Yes

## 2018-03-01 ENCOUNTER — TELEPHONE (OUTPATIENT)
Dept: OTHER | Facility: HOSPITAL | Age: 33
End: 2018-03-01

## 2018-03-02 LAB
CHLAMYDIA DNA CVX QL NAA+PROBE: NORMAL
N GONORRHOEA DNA GENITAL QL NAA+PROBE: NORMAL

## 2018-03-05 ENCOUNTER — APPOINTMENT (OUTPATIENT)
Dept: LAB | Age: 33
End: 2018-03-05
Payer: MEDICARE

## 2018-03-05 ENCOUNTER — TRANSCRIBE ORDERS (OUTPATIENT)
Dept: ADMINISTRATIVE | Age: 33
End: 2018-03-05

## 2018-03-05 DIAGNOSIS — R73.03 PREDIABETES: ICD-10-CM

## 2018-03-05 LAB
EST. AVERAGE GLUCOSE BLD GHB EST-MCNC: 111 MG/DL
HBA1C MFR BLD: 5.5 % (ref 4.2–6.3)
HPV RRNA GENITAL QL NAA+PROBE: NORMAL

## 2018-03-05 PROCEDURE — 36415 COLL VENOUS BLD VENIPUNCTURE: CPT

## 2018-03-05 PROCEDURE — 83036 HEMOGLOBIN GLYCOSYLATED A1C: CPT

## 2018-03-06 ENCOUNTER — OFFICE VISIT (OUTPATIENT)
Dept: BEHAVIORAL/MENTAL HEALTH CLINIC | Facility: CLINIC | Age: 33
End: 2018-03-06
Payer: MEDICARE

## 2018-03-06 DIAGNOSIS — F42.2 MIXED OBSESSIONAL THOUGHTS AND ACTS: Primary | ICD-10-CM

## 2018-03-06 PROCEDURE — 90853 GROUP PSYCHOTHERAPY: CPT | Performed by: SOCIAL WORKER

## 2018-03-06 NOTE — PSYCH
Psychotherapy Provided: Group Therapy     Length of time in session: 50 minutes, follow up in 2 week    Goals addressed in session: Goal 1     Pain:      none    0    Current suicide risk : Low     1 D:  The above was seen for Group Therapy session  Group members engaged in a discussion re: how they have struggled with managed their stressors over the past week  A:  Elizabeth actively participated in this discussion  She shared at length with other group members about her PT and decreased contact with her parents  P: She will be encouraged to continue to attend individual therapy weekly and the Adult Trauma group on a biweekly basis  At the end of group, Sabrina John stayed to discuss her cancelled session  She was reminded that she needs to comply with the agreed upon expectations in order to remain an active patient with this worker  She stated that she "is trying,"  But does not know "what else to do other than contact this worker" when overwhelmed with stress  Behavioral Health Treatment Plan [facility]: Diagnosis and Treatment Plan explained to Pamela Gates relates understanding diagnosis and is agreeable to Treatment Plan   Yes

## 2018-03-07 NOTE — PSYCH
History of Present Illness    Presenting Problems: Stressors: PATIENT HAVING INCREASE DEPRESSION WITH NO FAMILY SUPPORT  Referral Source: SHAUN CUNNINGHAM  She is not employed  She is not a smoker  Symptoms: suicidal ideation, plan: NO PLAN, self abusive behaviors, recent: YES, Past: YES, no homicidal thoughts, no history of violence toward others, depressed mood, anxiety, no psychosis, no medication noncompliance, sleep disturbances, no change in appetite, no agitation and no hypomania  Provisional Diagnosis: Axis I: BIPOLAR and Axis II: OCD  Substance Abuse: No substance abuse  Psychiatric Treatment History: 2015 [facility] 2013 [facility], Current Psychiatrist: DR Buzz Santoyo and Therapist: Sarina Souza   The patient does not require ambulatory assistance  Legal Issues: The patient does not have legal issues  ACCEPTED  Appointment Date: 01/09/2018        Signatures   Electronically signed by : Rosette Marie, ; Jan 4 2018 10:04AM EST                       (Author)    Electronically signed by : Rosette Marie, ; Jan 4 2018 11:32AM EST                       (Author)

## 2018-03-07 NOTE — PSYCH
History of Present Illness    Presenting Problems: Stressors: PATIENT HAS INCREASE DEPRESSION AND RECENT HOSPITALIZATION NO FAMLIY SUPPORT  Referral Source: SHAUN CUNNINGHAM  She is not employed  She is not a smoker  Symptoms: no suicidal ideation, recent attempt: HOSPITALIZED 01/09/2018, no self abusive behaviors, no homicidal thoughts, no history of violence toward others, depressed mood, anxiety, no psychosis, no medication noncompliance, sleep disturbances, no change in appetite, no agitation and no hypomania  Provisional Diagnosis: Axis I: BIPLOAR and Axis II: OCD BINGE EATING  Substance Abuse: No substance abuse  Psychiatric Treatment History: JAN 09 2018 [facility], Current Psychiatrist: DR Emmanuelle Richey and Therapist: Altagracia Drummond     The patient does not require ambulatory assistance  Legal Issues: The patient does not have legal issues  Action: Record not received  ACCEPTED  Appointment Date: 01/24/2018        Signatures   Electronically signed by : Giuseppe Fox, ; Jan 17 2018  1:56PM EST                       (Author)    Electronically signed by : Giuseppe Fox, ; Jan 17 2018  1:57PM EST                       (Author)

## 2018-03-10 LAB
LAB AP GYN PRIMARY INTERPRETATION: NORMAL
Lab: NORMAL
PATH INTERP SPEC-IMP: NORMAL

## 2018-03-11 DIAGNOSIS — R39.15 URINARY URGENCY: Primary | ICD-10-CM

## 2018-03-11 RX ORDER — OXYBUTYNIN CHLORIDE 10 MG/1
TABLET, EXTENDED RELEASE ORAL
Qty: 30 TABLET | Refills: 1 | Status: SHIPPED | OUTPATIENT
Start: 2018-03-11 | End: 2018-05-15 | Stop reason: SDUPTHER

## 2018-03-13 ENCOUNTER — OFFICE VISIT (OUTPATIENT)
Dept: BEHAVIORAL/MENTAL HEALTH CLINIC | Facility: CLINIC | Age: 33
End: 2018-03-13
Payer: MEDICARE

## 2018-03-13 DIAGNOSIS — F50.81 BINGE-EATING DISORDER, SEVERE: ICD-10-CM

## 2018-03-13 PROCEDURE — 90853 GROUP PSYCHOTHERAPY: CPT | Performed by: SOCIAL WORKER

## 2018-03-14 ENCOUNTER — PROCEDURE VISIT (OUTPATIENT)
Dept: UROLOGY | Facility: AMBULATORY SURGERY CENTER | Age: 33
End: 2018-03-14
Payer: MEDICARE

## 2018-03-14 VITALS — SYSTOLIC BLOOD PRESSURE: 132 MMHG | DIASTOLIC BLOOD PRESSURE: 68 MMHG | HEART RATE: 68 BPM

## 2018-03-14 DIAGNOSIS — N32.81 OVERACTIVE BLADDER: ICD-10-CM

## 2018-03-14 DIAGNOSIS — N39.41 URGE INCONTINENCE OF URINE: Primary | ICD-10-CM

## 2018-03-14 DIAGNOSIS — R35.0 URINARY FREQUENCY: ICD-10-CM

## 2018-03-14 PROCEDURE — 64566 NEUROELTRD STIM POST TIBIAL: CPT | Performed by: UROLOGY

## 2018-03-14 NOTE — PROGRESS NOTES
3/14/2018    Babak Lorenzo  1985  [id number]    Diagnosis  Chief Complaint     Urinary Urgency; Urinary Incontinence; Urinary Frequency      Patient presents for PTNS 8 of 24      Plan  Follow up in one month for next treatment  Urinary Incontinence Screening    Flowsheet Row Most Recent Value   Urinary Incontinence   Urinary Incontinence? Yes [1 pad just in case, significantly improved]   Incomplete emptying? No   Urinary frequency? No   Urinary urgency? No   Urinary hesitancy? No   Dysuria (painful difficult urination)? No   Nocturia (waking up to use the bathroom)? Yes [2x (incontinence has improved to maybe twice weekly)]   Straining (having to push to go)? No   Weak stream?  No   Intermittent stream?  No   Post void dribbling? No            Procedure PTNS  Session 8 of 24    Ankle used: right  Treatment settin  Duration of treatment: 30 minutes  Lead lot #:[id number]  Expiration Date:2020  Feeling/Response:toe and foot sensation    Patient Goals and Progress  Decreased Urgency Yes  Decreased Frequency Yes  Episodes of incontinence Yes  Sleep Through Night No  Related Health and Social Factors Yes    Patient reports that since being on both oxybutynin and myrbetriq her symptoms are very well controlled  She will also be starting pelvic floor physical therapy per her gynecologist in April      Jonny Clark RN

## 2018-03-15 ENCOUNTER — OFFICE VISIT (OUTPATIENT)
Dept: BEHAVIORAL/MENTAL HEALTH CLINIC | Facility: CLINIC | Age: 33
End: 2018-03-15
Payer: MEDICARE

## 2018-03-15 DIAGNOSIS — F63.0 GAMBLING DISORDER, MODERATE: ICD-10-CM

## 2018-03-15 DIAGNOSIS — F31.4 BIPOLAR I DISORDER, MOST RECENT EPISODE DEPRESSED, SEVERE WITHOUT PSYCHOTIC FEATURES (HCC): ICD-10-CM

## 2018-03-15 DIAGNOSIS — F42.2 MIXED OBSESSIONAL THOUGHTS AND ACTS: ICD-10-CM

## 2018-03-15 DIAGNOSIS — F50.81 BINGE-EATING DISORDER, SEVERE: ICD-10-CM

## 2018-03-15 PROCEDURE — 90834 PSYTX W PT 45 MINUTES: CPT | Performed by: SOCIAL WORKER

## 2018-03-15 NOTE — PSYCH
Psychotherapy Provided: Group Therapy     Length of time in session: 50 minutes, follow up in 2 week    Goals addressed in session: Goal 1     Pain:      none    0    Current suicide risk : Low     1 D:  The above was seen for Group Therapy session  Group members engaged in a discussion re: what it means to engage in Self care  A:  Elizabeth was open in discussing this topic and her progress with this area of her life with group members today  P: She will be encouraged to continue to attend individual therapy weekly and the Adult ED group on a biweekly basis  Behavioral Health Treatment Plan [facility]: Diagnosis and Treatment Plan explained to Levie Service relates understanding diagnosis and is agreeable to Treatment Plan   Yes

## 2018-03-16 ENCOUNTER — OFFICE VISIT (OUTPATIENT)
Dept: URGENT CARE | Age: 33
End: 2018-03-16
Payer: MEDICARE

## 2018-03-16 VITALS
SYSTOLIC BLOOD PRESSURE: 110 MMHG | BODY MASS INDEX: 45.99 KG/M2 | OXYGEN SATURATION: 98 % | TEMPERATURE: 98.3 F | WEIGHT: 293 LBS | HEART RATE: 68 BPM | HEIGHT: 67 IN | DIASTOLIC BLOOD PRESSURE: 70 MMHG | RESPIRATION RATE: 18 BRPM

## 2018-03-16 DIAGNOSIS — J01.90 ACUTE NON-RECURRENT SINUSITIS, UNSPECIFIED LOCATION: Primary | ICD-10-CM

## 2018-03-16 PROCEDURE — 99213 OFFICE O/P EST LOW 20 MIN: CPT | Performed by: FAMILY MEDICINE

## 2018-03-16 PROCEDURE — G0463 HOSPITAL OUTPT CLINIC VISIT: HCPCS | Performed by: FAMILY MEDICINE

## 2018-03-16 RX ORDER — CLOMIPRAMINE HYDROCHLORIDE 50 MG/1
200 CAPSULE ORAL 2 TIMES DAILY
COMMUNITY
End: 2018-08-18 | Stop reason: ALTCHOICE

## 2018-03-16 NOTE — PROGRESS NOTES
[facility]        NAME: Anushka Ron is a 34 y o  female  : 1985    MRN: [id number]  DATE: 2018  TIME: 7:08 PM    Assessment and Plan   Acute non-recurrent sinusitis, unspecified location [J01 90]  1  Acute non-recurrent sinusitis, unspecified location           Patient Instructions     Patient Instructions   No antibiotic is indicated at this time  May use nasal saline spray or Neti pot as needed  Ibuprofen 2 tablets every 6-8 hours as needed for facial pressure and discomfort  Plain Mucinex or equivalent store brand to help thin and loosen mucus  Take with full glass of fluid  Flonase  Rhinosinusitis   WHAT YOU NEED TO KNOW:   Rhinosinusitis (RS) is inflammation of your nose and sinuses  It commonly begins as a virus, often as a common cold  Viruses usually last 7 to 10 days and do not need treatment  When the virus does not get better on its own, you may have bacterial RS  This means that bacteria have begun to grow inside your sinuses  Acute RS lasts less than 4 weeks  Chronic RS lasts 12 weeks or more  Recurrent RS is when you have 4 or more episodes of RS in one year  DISCHARGE INSTRUCTIONS:   Return to the emergency department if:   · Your eye and eyelid are red, swollen, and painful  · You cannot open your eye  · You have double vision or you cannot see  · Your eyeball bulges out or you cannot move your eye  · You are more sleepy than normal or you notice changes in your ability to think, move, or talk  · You have a stiff neck, a fever, or a bad headache  · You have swelling of your forehead or scalp  Contact your healthcare provider if:   · Your symptoms are worse or do not improve after 3 to 5 days of treatment  · You have questions or concerns about your condition or care  Medicines: You may need any of the following:  · Acetaminophen  decreases pain and fever  It is available without a doctor's order   Ask how much to take and how often to take it  Follow directions  Acetaminophen can cause liver damage if not taken correctly  · NSAIDs , such as ibuprofen, help decrease swelling, pain, and fever  This medicine is available with or without a doctor's order  NSAIDs can cause stomach bleeding or kidney problems in certain people  If you take blood thinner medicine, always ask your healthcare provider if NSAIDs are safe for you  Always read the medicine label and follow directions  · Nasal steroid sprays  decrease inflammation in your nose and sinuses  · Decongestants  reduce swelling and drain mucus in the nose and sinuses  They may help you breathe easier  · Antihistamines  dry mucus in the nose and relieve sneezing  · Antibiotics  treat a bacterial infection and may be needed if your symptoms do not improve or they get worse  · Take your medicine as directed  Contact your healthcare provider if you think your medicine is not helping or if you have side effects  Tell him or her if you are allergic to any medicine  Keep a list of the medicines, vitamins, and herbs you take  Include the amounts, and when and why you take them  Bring the list or the pill bottles to follow-up visits  Carry your medicine list with you in case of an emergency  Self-care:   · Rinse your sinuses  Use a sinus rinse device to rinse your nasal passages with a saline (salt water) solution  This will help thin the mucus in your nose and rinse away pollen and dirt  It will also help reduce swelling so you can breathe normally  Ask your healthcare provider how often to do this  · Breathe in steam   Heat a bowl of water until you see steam  Lean over the bowl and make a tent over your head with a large towel  Breathe deeply for about 20 minutes  Be careful not to get too close to the steam or burn yourself  Do this 3 times a day  You can also breathe deeply when you take a hot shower  · Sleep with your head elevated    Place an extra pillow under your head before you go to sleep to help your sinuses drain  · Drink liquids as directed  Ask your healthcare provider how much liquid to drink each day and which liquids are best for you  Liquids will thin the mucus in your nose and help it drain  Avoid drinks that contain alcohol or caffeine  · Do not smoke, and avoid secondhand smoke  Nicotine and other chemicals in cigarettes and cigars can make your symptoms worse  Ask your healthcare provider for information if you currently smoke and need help to quit  E-cigarettes or smokeless tobacco still contain nicotine  Talk to your healthcare provider before you use these products  Follow up with your healthcare provider as directed: Follow up if your symptoms are worse or not better after 3 to 5 days of treatment  Write down your questions so you remember to ask them during your visits  © 2017 [address] [facility] Information is for End User's use only and may not be sold, redistributed or otherwise used for commercial purposes  All illustrations and images included in CareNotes® are the copyrighted property of A D A M , Inc  or Ceasar Alcaarz  The above information is an  only  It is not intended as medical advice for individual conditions or treatments  Talk to your doctor, nurse or pharmacist before following any medical regimen to see if it is safe and effective for you  Chief Complaint     Chief Complaint   Patient presents with    Cold Like Symptoms     x 2 days - facial pain, nasal congestion with PND, sore throat and sl  HA  Took Advil and Flonase   Sore Throat         History of Present Illness   [facility] to [facility] c/o    27-year-old female with sinus pain and pressure  She woke up yesterday morning with facial discomfort  She has had some drainage with postnasal drip and some mild sore throat  She took Advil this morning as well as used Flonase    She got a little bit of nose bleed with Flonase  History of sinus surgery when she was 11years old  Last seen by ear nose throat doctor few years ago  She states that she has sinus issues  She states that she cannot take Sudafed because the last time she took it it threw her into a bipolar episode  Review of Systems   Review of Systems   Constitutional: Negative for activity change, appetite change, chills, fatigue and fever  HENT: Positive for congestion, postnasal drip, rhinorrhea, sinus pain, sinus pressure and sore throat  Eyes: Negative  Respiratory: Negative  Cardiovascular: Negative            Current Medications     Long-Term Prescriptions   Medication Sig Dispense Refill    Cholecalciferol (VITAMIN D3) 5000 units CAPS Take 5,000 Units by mouth daily      lamoTRIgine (LaMICtal) 150 MG tablet Take 2 tablets by mouth daily at bedtime for 30 days 60 tablet 0    levocetirizine (XYZAL) 5 MG tablet Take 5 mg by mouth every evening      levothyroxine 100 mcg tablet Take 1 tablet by mouth daily in the early morning for 30 days 30 tablet 0    lisinopril (ZESTRIL) 10 mg tablet Take 1 tablet by mouth daily 30 tablet 0    lurasidone (LATUDA) 120 mg tablet Take 1 tablet by mouth daily with dinner for 30 days (Patient taking differently: Take 80 mg by mouth daily with dinner  ) 30 tablet 0    Mirabegron ER 50 MG TB24 Take 1 tablet by mouth daily for 30 days 30 tablet 0    Multiple Vitamin (MULTIVITAMIN) capsule Take 1 capsule by mouth daily      omeprazole (PriLOSEC) 20 mg delayed release capsule Take 1 capsule by mouth daily in the early morning for 30 days 30 capsule 0    oxybutynin (DITROPAN-XL) 10 MG 24 hr tablet TAKE 1 TABLET DAILY 30 tablet 1    rOPINIRole (REQUIP) 3 mg tablet Take 1 tablet by mouth every 12 (twelve) hours for 30 days Take in a morning and at bedtime 60 tablet 0    sertraline (ZOLOFT) 100 mg tablet Take 2 tablets by mouth daily for 30 days 60 tablet 0       Current Allergies     Allergies as of 03/16/2018 - Reviewed 03/16/2018   Allergen Reaction Noted    Other  02/26/2018            The following portions of the patient's history were reviewed and updated as appropriate: allergies, current medications, past family history, past medical history, past social history, past surgical history and problem list     Objective   /70 (BP Location: Right arm, Patient Position: Sitting, Cuff Size: Large)   Pulse 68   Temp 98 3 °F (36 8 °C)   Resp 18   Ht 5' 7" (1 702 m)   Wt (!) 168 kg (371 lb 6 4 oz)   LMP 03/01/2018 (Exact Date)   SpO2 98%   BMI 58 17 kg/m²        Physical Exam     Physical Exam   Constitutional: She is oriented to person, place, and time  She appears well-developed and well-nourished  No distress  Appears acutely ill but in no acute distress   HENT:   Head: Normocephalic and atraumatic  Right Ear: External ear normal    Left Ear: External ear normal    Cobblestoning of posterior pharynx with patchy redness  No exudate or fetid breath  Nasal turbinates red and edematous  No purulent mucus   Eyes: Conjunctivae and EOM are normal  Pupils are equal, round, and reactive to light  Right eye exhibits no discharge  Left eye exhibits no discharge  Neck: Normal range of motion  Neck supple  Cardiovascular: Normal rate, regular rhythm and normal heart sounds  Exam reveals no gallop and no friction rub  No murmur heard  Pulmonary/Chest: Effort normal and breath sounds normal  No respiratory distress  She has no wheezes  She has no rales  Lymphadenopathy:     She has no cervical adenopathy  Neurological: She is alert and oriented to person, place, and time  Skin: Skin is warm and dry  No rash noted  She is not diaphoretic  Psychiatric: She has a normal mood and affect  Nursing note reviewed

## 2018-03-16 NOTE — PATIENT INSTRUCTIONS
No antibiotic is indicated at this time  May use nasal saline spray or Neti pot as needed  Ibuprofen 2 tablets every 6-8 hours as needed for facial pressure and discomfort  Plain Mucinex or equivalent store brand to help thin and loosen mucus  Take with full glass of fluid  Flonase  Rhinosinusitis   WHAT YOU NEED TO KNOW:   Rhinosinusitis (RS) is inflammation of your nose and sinuses  It commonly begins as a virus, often as a common cold  Viruses usually last 7 to 10 days and do not need treatment  When the virus does not get better on its own, you may have bacterial RS  This means that bacteria have begun to grow inside your sinuses  Acute RS lasts less than 4 weeks  Chronic RS lasts 12 weeks or more  Recurrent RS is when you have 4 or more episodes of RS in one year  DISCHARGE INSTRUCTIONS:   Return to the emergency department if:   · Your eye and eyelid are red, swollen, and painful  · You cannot open your eye  · You have double vision or you cannot see  · Your eyeball bulges out or you cannot move your eye  · You are more sleepy than normal or you notice changes in your ability to think, move, or talk  · You have a stiff neck, a fever, or a bad headache  · You have swelling of your forehead or scalp  Contact your healthcare provider if:   · Your symptoms are worse or do not improve after 3 to 5 days of treatment  · You have questions or concerns about your condition or care  Medicines: You may need any of the following:  · Acetaminophen  decreases pain and fever  It is available without a doctor's order  Ask how much to take and how often to take it  Follow directions  Acetaminophen can cause liver damage if not taken correctly  · NSAIDs , such as ibuprofen, help decrease swelling, pain, and fever  This medicine is available with or without a doctor's order  NSAIDs can cause stomach bleeding or kidney problems in certain people   If you take blood thinner medicine, always ask your healthcare provider if NSAIDs are safe for you  Always read the medicine label and follow directions  · Nasal steroid sprays  decrease inflammation in your nose and sinuses  · Decongestants  reduce swelling and drain mucus in the nose and sinuses  They may help you breathe easier  · Antihistamines  dry mucus in the nose and relieve sneezing  · Antibiotics  treat a bacterial infection and may be needed if your symptoms do not improve or they get worse  · Take your medicine as directed  Contact your healthcare provider if you think your medicine is not helping or if you have side effects  Tell him or her if you are allergic to any medicine  Keep a list of the medicines, vitamins, and herbs you take  Include the amounts, and when and why you take them  Bring the list or the pill bottles to follow-up visits  Carry your medicine list with you in case of an emergency  Self-care:   · Rinse your sinuses  Use a sinus rinse device to rinse your nasal passages with a saline (salt water) solution  This will help thin the mucus in your nose and rinse away pollen and dirt  It will also help reduce swelling so you can breathe normally  Ask your healthcare provider how often to do this  · Breathe in steam   Heat a bowl of water until you see steam  Lean over the bowl and make a tent over your head with a large towel  Breathe deeply for about 20 minutes  Be careful not to get too close to the steam or burn yourself  Do this 3 times a day  You can also breathe deeply when you take a hot shower  · Sleep with your head elevated  Place an extra pillow under your head before you go to sleep to help your sinuses drain  · Drink liquids as directed  Ask your healthcare provider how much liquid to drink each day and which liquids are best for you  Liquids will thin the mucus in your nose and help it drain  Avoid drinks that contain alcohol or caffeine       · Do not smoke, and avoid secondhand smoke  Nicotine and other chemicals in cigarettes and cigars can make your symptoms worse  Ask your healthcare provider for information if you currently smoke and need help to quit  E-cigarettes or smokeless tobacco still contain nicotine  Talk to your healthcare provider before you use these products  Follow up with your healthcare provider as directed: Follow up if your symptoms are worse or not better after 3 to 5 days of treatment  Write down your questions so you remember to ask them during your visits  © 2017 [facility] [facility] Information is for End User's use only and may not be sold, redistributed or otherwise used for commercial purposes  All illustrations and images included in CareNotes® are the copyrighted property of A D A M , Inc  or Ceasar Alcaraz  The above information is an  only  It is not intended as medical advice for individual conditions or treatments  Talk to your doctor, nurse or pharmacist before following any medical regimen to see if it is safe and effective for you

## 2018-03-19 NOTE — PSYCH
Psychotherapy Provided: Individual Psychotherapy 50 minutes     Length of time in session: 50 minutes, follow up in 1 week    Goals addressed in session: Goal 1, Goal 2 and Goal 3      Pain:      none    0    Current suicide risk : Low     D: Met with Elizabeth for Individual Therapy session  Elizabeth spoke with this worker further today about additional concerns she has written down since her last session  She once again stated that she was uncertain of all of the boundaries and ways she has / has not violated them  She voiced a willingness to continue to address this area  A: Elizabeth was open and relaxed during today's session  She repeatedly stated that the increased dose of Ananfranil was making a tremendous difference in her current functioning  P: Upcoming sessions will be used to continue to address the above  She will continue to attend both weekly Trauma and ED group therapy sessions  Behavioral Health Treatment Plan [facility]: Diagnosis and Treatment Plan explained to Jaime Mosley relates understanding diagnosis and is agreeable to Treatment Plan   Yes

## 2018-03-21 RX ORDER — LEVOCETIRIZINE DIHYDROCHLORIDE 5 MG/1
TABLET, FILM COATED ORAL
Qty: 30 TABLET | Refills: 1 | OUTPATIENT
Start: 2018-03-21

## 2018-03-22 ENCOUNTER — OFFICE VISIT (OUTPATIENT)
Dept: BEHAVIORAL/MENTAL HEALTH CLINIC | Facility: CLINIC | Age: 33
End: 2018-03-22
Payer: MEDICARE

## 2018-03-22 DIAGNOSIS — F50.81 BINGE-EATING DISORDER, SEVERE: ICD-10-CM

## 2018-03-22 DIAGNOSIS — F31.4 BIPOLAR I DISORDER, MOST RECENT EPISODE DEPRESSED, SEVERE WITHOUT PSYCHOTIC FEATURES (HCC): ICD-10-CM

## 2018-03-22 DIAGNOSIS — F42.2 MIXED OBSESSIONAL THOUGHTS AND ACTS: ICD-10-CM

## 2018-03-22 DIAGNOSIS — J30.9 CHRONIC ALLERGIC RHINITIS, UNSPECIFIED SEASONALITY, UNSPECIFIED TRIGGER: Primary | ICD-10-CM

## 2018-03-22 DIAGNOSIS — F63.0 GAMBLING DISORDER, MODERATE: ICD-10-CM

## 2018-03-22 PROCEDURE — 90834 PSYTX W PT 45 MINUTES: CPT | Performed by: SOCIAL WORKER

## 2018-03-22 RX ORDER — LEVOCETIRIZINE DIHYDROCHLORIDE 5 MG/1
5 TABLET, FILM COATED ORAL EVERY EVENING
Qty: 30 TABLET | Refills: 2 | Status: SHIPPED | OUTPATIENT
Start: 2018-03-22 | End: 2018-05-20 | Stop reason: SDDI

## 2018-03-22 NOTE — PSYCH
Psychotherapy Provided: Individual Psychotherapy 50 minutes     Length of time in session: 50 minutes, follow up in 1 week    Goals addressed in session: Goal 1, Goal 2 and Goal 3      Pain:      none    0    Current suicide risk : Medium     D: Met with Elizabeth for Individual Therapy session  Elizabeth spoke with this worker today her regressed functioning over the last two days as she began to prepare for today's  session  She stated that she wants to be able to drop off her writing ahead of time so that it "is not in her possession" and that this worker can review it ahead of session  Boundaries were again reviewed and set  She was informed again that her sessions are her time to express her thoughts, concerns and issues--not prior to or during other people's sessions  A: Elizabeth was upset during today's session and chose not to share what she wrote about  She shared that she is having significant side effects from the increased dose of Ananfranil   P: Upcoming sessions will be used to continue to address the above  She will continue to attend both weekly Trauma and ED group therapy sessions  Behavioral Health Treatment Plan [employer]: Diagnosis and Treatment Plan explained to Rice Jeans relates understanding diagnosis and is agreeable to Treatment Plan   Yes

## 2018-03-22 NOTE — PSYCH
Assessment/Plan:      Diagnoses and all orders for this visit:    Binge-eating disorder, severe    Bipolar I disorder, most recent episode depressed, severe without psychotic features ([address] )    Gambling disorder, moderate    Mixed obsessional thoughts and acts          Subjective:     Patient ID: Dina Rai is a 34 y o  female  Risk Assessment:   The following ratings are based on my observation of this patient over the last session    Risk of Harm to Self:   Demographic risk factors include   Historical Risk Factors include chronic psychiatric problems, history of suicidal behaviors/attempts, self-mutilating behaviors and history of gambling  Recent Specific Risk Factors include experienced fleeting ideation, unable to visualize a realistic positive future, feelings of guilt or self blame and recent losses limits being set by therapist  Additional Factors for a Child or Adolescent n/a    Risk of Harm to Others:   Demographic Risk Factors include n/a  Historical Risk Factors include victim of childhood bullying  Recent Specific Risk Factors include concomitant mood or thought disorder    Access to Weapons:   Charlie Armstrong has access to the following weapons: n/a  The following steps have been taken to ensure weapons are properly secured: na    Based on the above information, the client presents the following risk of harm to self or others:  medium    The following interventions are recommended:   consultation with Psychiatrist- will see and share recent puicidality with him today at 3pm appt    Notes regarding this Risk Assessment: Pt shared thoughts of taking bottle of prescribed medication last night  Did not do so as people care about her and would be saddened by her death, did not want to do so in roommates home

## 2018-03-23 ENCOUNTER — TELEPHONE (OUTPATIENT)
Dept: INTERNAL MEDICINE CLINIC | Facility: CLINIC | Age: 33
End: 2018-03-23

## 2018-03-23 DIAGNOSIS — T78.40XA ALLERGIC DISORDER, INITIAL ENCOUNTER: Primary | ICD-10-CM

## 2018-03-23 RX ORDER — MONTELUKAST SODIUM 10 MG/1
10 TABLET ORAL
Qty: 30 TABLET | Refills: 2 | Status: SHIPPED | OUTPATIENT
Start: 2018-03-23 | End: 2018-06-09 | Stop reason: SDUPTHER

## 2018-03-27 ENCOUNTER — OFFICE VISIT (OUTPATIENT)
Dept: BEHAVIORAL/MENTAL HEALTH CLINIC | Facility: CLINIC | Age: 33
End: 2018-03-27
Payer: MEDICARE

## 2018-03-27 DIAGNOSIS — F42.2 MIXED OBSESSIONAL THOUGHTS AND ACTS: ICD-10-CM

## 2018-03-27 PROCEDURE — 90853 GROUP PSYCHOTHERAPY: CPT | Performed by: SOCIAL WORKER

## 2018-03-28 ENCOUNTER — OFFICE VISIT (OUTPATIENT)
Dept: BEHAVIORAL/MENTAL HEALTH CLINIC | Facility: CLINIC | Age: 33
End: 2018-03-28
Payer: MEDICARE

## 2018-03-28 DIAGNOSIS — F63.0 GAMBLING DISORDER, MODERATE: ICD-10-CM

## 2018-03-28 DIAGNOSIS — F50.81 BINGE-EATING DISORDER, SEVERE: ICD-10-CM

## 2018-03-28 DIAGNOSIS — F31.4 BIPOLAR I DISORDER, MOST RECENT EPISODE DEPRESSED, SEVERE WITHOUT PSYCHOTIC FEATURES (HCC): ICD-10-CM

## 2018-03-28 DIAGNOSIS — F42.2 MIXED OBSESSIONAL THOUGHTS AND ACTS: ICD-10-CM

## 2018-03-28 PROCEDURE — 90834 PSYTX W PT 45 MINUTES: CPT | Performed by: SOCIAL WORKER

## 2018-03-28 NOTE — PSYCH
Psychotherapy Provided: Individual Psychotherapy 50 minutes     Length of time in session: 50 minutes, follow up in 1 week    Goals addressed in session: Goal 1, Goal 2 and Goal 3      Pain:      none    0    Current suicide risk : Low    D: Met with Elizabeth for Individual Therapy session  Elizabeth spoke with this worker today her improved functioning over the past week, her feelings re: her medication adjustments, and she shared a letter to her previous dr Clotilde ColletRomana Maywood was not depressed or upset during today's session and was able to process thoughts and feeelings openly today  P: Upcoming sessions will be used to continue to address the above  She will continue to attend both weekly Trauma and ED group therapy sessions  Behavioral Health Treatment Plan [facility]: Diagnosis and Treatment Plan explained to Sujatha Holt relates understanding diagnosis and is agreeable to Treatment Plan   Yes

## 2018-03-30 NOTE — PSYCH
Psychotherapy Provided: Group Therapy     Length of time in session: 60 minutes, follow up in 1 week    Goals addressed in session: Goal 1     Pain:      none    0    Current suicide risk : Low     1 D:  The above was seen for Group Therapy session  Group members engaged in an activity / discussion re: what their compassionate self would look, sound, feel, be like if they could create it as an ideal       A:  Elizabeth shared both re: her progress and struggles with group members today while also diligently working on the above activity  P : She will be encouraged to continue to attend individual therapy weekly and the Trauma group on a biweekly basis  Behavioral Health Treatment Plan [facility]: Diagnosis and Treatment Plan explained to Tracee Morales relates understanding diagnosis and is agreeable to Treatment Plan   Yes

## 2018-04-02 ENCOUNTER — OFFICE VISIT (OUTPATIENT)
Dept: BEHAVIORAL/MENTAL HEALTH CLINIC | Facility: CLINIC | Age: 33
End: 2018-04-02
Payer: MEDICARE

## 2018-04-02 DIAGNOSIS — F31.4 BIPOLAR I DISORDER, MOST RECENT EPISODE DEPRESSED, SEVERE WITHOUT PSYCHOTIC FEATURES (HCC): ICD-10-CM

## 2018-04-02 DIAGNOSIS — F50.81 BINGE-EATING DISORDER, SEVERE: ICD-10-CM

## 2018-04-02 DIAGNOSIS — F42.2 MIXED OBSESSIONAL THOUGHTS AND ACTS: ICD-10-CM

## 2018-04-02 PROCEDURE — 90834 PSYTX W PT 45 MINUTES: CPT | Performed by: SOCIAL WORKER

## 2018-04-02 NOTE — PSYCH
Psychotherapy Provided: Individual Psychotherapy 50 minutes     Length of time in session: 50 minutes, follow up in 1 week    Goals addressed in session: Goal 1, Goal 2 and Goal 3      Pain:      none    0    Current suicide risk : Low    D: Met with Elizabeth for Individual Therapy session  Elizabeth spoke with this worker today re: her feelings re: her conflicted relationship with her father  She also spoke about the time she spent over the holiday with her niece and nephew  A: Elizabeth was insightful and receptive to feedback today on her continued dependence on her father and desire for parenting  P: Upcoming sessions will be used to continue to address the above  She will continue to attend both weekly Trauma and ED group therapy sessions  Behavioral Health Treatment Plan [facility]: Diagnosis and Treatment Plan explained to Avi Morin relates understanding diagnosis and is agreeable to Treatment Plan   Yes

## 2018-04-03 ENCOUNTER — OFFICE VISIT (OUTPATIENT)
Dept: BEHAVIORAL/MENTAL HEALTH CLINIC | Facility: CLINIC | Age: 33
End: 2018-04-03
Payer: MEDICARE

## 2018-04-03 DIAGNOSIS — F31.4 BIPOLAR I DISORDER, MOST RECENT EPISODE DEPRESSED, SEVERE WITHOUT PSYCHOTIC FEATURES (HCC): ICD-10-CM

## 2018-04-03 PROCEDURE — 90853 GROUP PSYCHOTHERAPY: CPT | Performed by: SOCIAL WORKER

## 2018-04-05 NOTE — PSYCH
Psychotherapy Provided: Group Therapy     Length of time in session: 60 minutes, follow up in 1 week    Goals addressed in session: Goal 1     Pain:      none    0    Current suicide risk : Low     1 D:  The above was seen for Group Therapy session  Group members engaged in a discussion re: compassionate thinking and how this is impacted by stress/trauma  A:  Elizabeth shared with the group re: her recent struggles  She was supportive, as well of the others who shared today  P : She will be encouraged to continue to attend individual therapy weekly and the Trauma group on a biweekly basis  Behavioral Health Treatment Plan [address] [place] [address] [address]: Diagnosis and Treatment Plan explained to Colton Li relates understanding diagnosis and is agreeable to Treatment Plan   Yes

## 2018-04-10 ENCOUNTER — OFFICE VISIT (OUTPATIENT)
Dept: BEHAVIORAL/MENTAL HEALTH CLINIC | Facility: CLINIC | Age: 33
End: 2018-04-10
Payer: MEDICARE

## 2018-04-10 DIAGNOSIS — F50.81 BINGE-EATING DISORDER, SEVERE: ICD-10-CM

## 2018-04-10 PROCEDURE — 90853 GROUP PSYCHOTHERAPY: CPT | Performed by: SOCIAL WORKER

## 2018-04-11 ENCOUNTER — PROCEDURE VISIT (OUTPATIENT)
Dept: UROLOGY | Facility: AMBULATORY SURGERY CENTER | Age: 33
End: 2018-04-11

## 2018-04-11 ENCOUNTER — OFFICE VISIT (OUTPATIENT)
Dept: BEHAVIORAL/MENTAL HEALTH CLINIC | Facility: CLINIC | Age: 33
End: 2018-04-11
Payer: MEDICARE

## 2018-04-11 VITALS
BODY MASS INDEX: 45.99 KG/M2 | WEIGHT: 293 LBS | HEART RATE: 60 BPM | SYSTOLIC BLOOD PRESSURE: 124 MMHG | DIASTOLIC BLOOD PRESSURE: 84 MMHG | HEIGHT: 67 IN

## 2018-04-11 DIAGNOSIS — N39.41 URGE INCONTINENCE OF URINE: ICD-10-CM

## 2018-04-11 DIAGNOSIS — F42.2 MIXED OBSESSIONAL THOUGHTS AND ACTS: ICD-10-CM

## 2018-04-11 DIAGNOSIS — F50.81 BINGE-EATING DISORDER, SEVERE: ICD-10-CM

## 2018-04-11 DIAGNOSIS — F63.0 GAMBLING DISORDER, MODERATE: ICD-10-CM

## 2018-04-11 DIAGNOSIS — F31.4 BIPOLAR I DISORDER, MOST RECENT EPISODE DEPRESSED, SEVERE WITHOUT PSYCHOTIC FEATURES (HCC): ICD-10-CM

## 2018-04-11 DIAGNOSIS — N32.81 OVERACTIVE BLADDER: ICD-10-CM

## 2018-04-11 DIAGNOSIS — R35.0 URINARY FREQUENCY: ICD-10-CM

## 2018-04-11 PROCEDURE — 90834 PSYTX W PT 45 MINUTES: CPT | Performed by: SOCIAL WORKER

## 2018-04-11 PROCEDURE — 64566 NEUROELTRD STIM POST TIBIAL: CPT | Performed by: UROLOGY

## 2018-04-11 NOTE — PROGRESS NOTES
4/11/2018    [place]vet [place]  1985  [id number]    Diagnosis  Chief Complaint     Urinary Urgency; Urinary Incontinence; Urinary Frequency      Patient presents for PTNS 9 of 24      Plan  Will follow up in one month for next treatment    Urinary Incontinence Screening    Flowsheet Row Most Recent Value   Urinary Incontinence   Urinary Incontinence? Yes [occ nighttime incontinence, urge]   Incomplete emptying? No   Urinary frequency? No   Urinary urgency? Yes [at times]   Urinary hesitancy? No   Dysuria (painful difficult urination)? Yes [if held too long]   Nocturia (waking up to use the bathroom)? Yes [3-4x]   Straining (having to push to go)? No   Weak stream?  No   Intermittent stream?  No   Post void dribbling?   No            Procedure PTNS  Session 9 of 24    Ankle used: right  Treatment setting:10  Duration of treatment: 30 minutes  Lead lot #:[id number]  Expiration Date:10/28/2020  Feeling/Response:sensation at needle site, she reports she rarely feels it in toes or foot    Patient Goals and Progress  Decreased Urgency Yes, still present but improved  Decreased Frequency Yes, improved  Episodes of incontinence Yes, worsening but not back to basline  Sleep Through Night No  Related Health and Social Factors Yes, started Anafranil which causes dry mouth causing increased fluid intake      Alisa Teran, RN  CURN, BSN

## 2018-04-12 NOTE — PSYCH
Psychotherapy Provided: Group Therapy     Length of time in session: 60 minutes, follow up in 1 week    Goals addressed in session: Goal 1     Pain:      none    0    Current suicide risk : Low     1 D:  The above was seen for Group Therapy session  Group members engaged in a discussion re: their recent struggles with their Ed  They also discussed what it could be like to be (more) grateful towards / for their body  A:  Elizabeth shared re: her regression / struggles with group members today while also supporting several others who shared  P : She will be encouraged to continue to attend individual therapy weekly and the ED group on a biweekly basis  Behavioral Health Treatment Plan [facility]: Diagnosis and Treatment Plan explained to Quang Agarwal relates understanding diagnosis and is agreeable to Treatment Plan   Yes

## 2018-04-16 DIAGNOSIS — K21.9 GASTROESOPHAGEAL REFLUX DISEASE WITHOUT ESOPHAGITIS: ICD-10-CM

## 2018-04-16 RX ORDER — OMEPRAZOLE 20 MG/1
20 CAPSULE, DELAYED RELEASE ORAL
Qty: 90 CAPSULE | Refills: 1 | Status: SHIPPED | OUTPATIENT
Start: 2018-04-16 | End: 2018-09-30 | Stop reason: SDUPTHER

## 2018-04-16 NOTE — PSYCH
Psychotherapy Provided: Individual Psychotherapy 50 minutes     Length of time in session: 50 minutes, follow up in 1 week    Goals addressed in session: Goal 1, Goal 2 and Goal 3      Pain:      none    0    Current suicide risk : Low    D: Met with Elizabeth for Individual Therapy session  Elizabeth spoke with this worker today re: her feelings about her night eating and how overwhelmingly out of control this behavior has become  A:  Elizabeth spoke very openly about this today  She admits that this is completely beyond her control and she worries about aspirating on vomit, food in her sleep  P: Upcoming sessions will be used to continue to address the above  She will continue to attend both weekly Trauma and ED group therapy sessions  Behavioral Health Treatment Plan [address] [place] [address] [address]: Diagnosis and Treatment Plan explained to Mejia Ruff relates understanding diagnosis and is agreeable to Treatment Plan   Yes

## 2018-04-17 ENCOUNTER — OFFICE VISIT (OUTPATIENT)
Dept: BEHAVIORAL/MENTAL HEALTH CLINIC | Facility: CLINIC | Age: 33
End: 2018-04-17
Payer: MEDICARE

## 2018-04-17 DIAGNOSIS — F42.2 MIXED OBSESSIONAL THOUGHTS AND ACTS: ICD-10-CM

## 2018-04-17 PROCEDURE — 90853 GROUP PSYCHOTHERAPY: CPT | Performed by: SOCIAL WORKER

## 2018-04-18 NOTE — PSYCH
Psychotherapy Provided: Group Therapy     Length of time in session: 60 minutes, follow up in 1 week    Goals addressed in session: Goal 1     Pain:      none    0    Current suicide risk : Low     1 D:  The above was seen for Group Therapy session  Group members engaged in a discussion re: perception and trauma recovery  A:  Elizabeth spoke today about her own struggles / progress during todays group  She  stated her excitement at starting a very limited part-time job over the next week while also explaining that she does not want it to interfere with her freedom  P : She will be encouraged to continue to attend individual therapy weekly and the Trauma group on a biweekly basis  Behavioral Health Treatment Plan [facility]: Diagnosis and Treatment Plan explained to Caitlyn Sweeney relates understanding diagnosis and is agreeable to Treatment Plan   Yes

## 2018-04-19 ENCOUNTER — OFFICE VISIT (OUTPATIENT)
Dept: BEHAVIORAL/MENTAL HEALTH CLINIC | Facility: CLINIC | Age: 33
End: 2018-04-19
Payer: MEDICARE

## 2018-04-19 DIAGNOSIS — F42.2 MIXED OBSESSIONAL THOUGHTS AND ACTS: ICD-10-CM

## 2018-04-19 DIAGNOSIS — F31.4 BIPOLAR I DISORDER, MOST RECENT EPISODE DEPRESSED, SEVERE WITHOUT PSYCHOTIC FEATURES (HCC): ICD-10-CM

## 2018-04-19 DIAGNOSIS — F50.81 BINGE-EATING DISORDER, SEVERE: ICD-10-CM

## 2018-04-19 DIAGNOSIS — F63.0 GAMBLING DISORDER, MODERATE: ICD-10-CM

## 2018-04-19 PROCEDURE — 90834 PSYTX W PT 45 MINUTES: CPT | Performed by: SOCIAL WORKER

## 2018-04-19 NOTE — PSYCH
Psychotherapy Provided: Individual Psychotherapy 50 minutes     Length of time in session: 50 minutes, follow up in 1 week    Goals addressed in session: Goal 1, Goal 2 and Goal 3      Pain:      none    0    Current suicide risk : Low    D: Met with Elizabeth for Individual Therapy session  Elizabeth spoke with this worker today re: her journal entries  She asked for time at the beginning of each session to review them with this worker  She also shared her first meeting with her new Nutritionist and the work they will be doing together  A:  Elizabeth spoke very openly today  She is struggling with a new behavior-- "enjoying" swallowing food  P: Upcoming sessions will be used to continue to address the above  She will continue to attend both weekly Trauma and ED group therapy sessions  Behavioral Health Treatment Plan [facility]: Diagnosis and Treatment Plan explained to Valeria Giraldo relates understanding diagnosis and is agreeable to Treatment Plan   Yes

## 2018-04-23 ENCOUNTER — APPOINTMENT (OUTPATIENT)
Dept: PHYSICAL THERAPY | Facility: REHABILITATION | Age: 33
End: 2018-04-23
Payer: MEDICARE

## 2018-04-26 ENCOUNTER — OFFICE VISIT (OUTPATIENT)
Dept: BEHAVIORAL/MENTAL HEALTH CLINIC | Facility: CLINIC | Age: 33
End: 2018-04-26
Payer: MEDICARE

## 2018-04-26 DIAGNOSIS — F63.0 GAMBLING DISORDER, MODERATE: ICD-10-CM

## 2018-04-26 DIAGNOSIS — F42.2 MIXED OBSESSIONAL THOUGHTS AND ACTS: ICD-10-CM

## 2018-04-26 DIAGNOSIS — F50.81 BINGE-EATING DISORDER, SEVERE: ICD-10-CM

## 2018-04-26 DIAGNOSIS — F31.4 BIPOLAR I DISORDER, MOST RECENT EPISODE DEPRESSED, SEVERE WITHOUT PSYCHOTIC FEATURES (HCC): ICD-10-CM

## 2018-04-26 PROCEDURE — 90834 PSYTX W PT 45 MINUTES: CPT | Performed by: SOCIAL WORKER

## 2018-04-30 ENCOUNTER — OFFICE VISIT (OUTPATIENT)
Dept: BEHAVIORAL/MENTAL HEALTH CLINIC | Facility: CLINIC | Age: 33
End: 2018-04-30
Payer: MEDICARE

## 2018-04-30 ENCOUNTER — EVALUATION (OUTPATIENT)
Dept: PHYSICAL THERAPY | Facility: REHABILITATION | Age: 33
End: 2018-04-30
Payer: MEDICARE

## 2018-04-30 ENCOUNTER — APPOINTMENT (OUTPATIENT)
Dept: PHYSICAL THERAPY | Facility: REHABILITATION | Age: 33
End: 2018-04-30
Payer: MEDICARE

## 2018-04-30 DIAGNOSIS — F42.2 MIXED OBSESSIONAL THOUGHTS AND ACTS: ICD-10-CM

## 2018-04-30 DIAGNOSIS — M62.89 PELVIC FLOOR DYSFUNCTION: Primary | ICD-10-CM

## 2018-04-30 DIAGNOSIS — F50.81 BINGE-EATING DISORDER, SEVERE: ICD-10-CM

## 2018-04-30 DIAGNOSIS — F63.0 GAMBLING DISORDER, MODERATE: ICD-10-CM

## 2018-04-30 DIAGNOSIS — F31.4 BIPOLAR I DISORDER, MOST RECENT EPISODE DEPRESSED, SEVERE WITHOUT PSYCHOTIC FEATURES (HCC): ICD-10-CM

## 2018-04-30 PROCEDURE — 97162 PT EVAL MOD COMPLEX 30 MIN: CPT | Performed by: PHYSICAL THERAPIST

## 2018-04-30 PROCEDURE — G8990 OTHER PT/OT CURRENT STATUS: HCPCS | Performed by: PHYSICAL THERAPIST

## 2018-04-30 PROCEDURE — 90834 PSYTX W PT 45 MINUTES: CPT | Performed by: SOCIAL WORKER

## 2018-04-30 PROCEDURE — G8991 OTHER PT/OT GOAL STATUS: HCPCS | Performed by: PHYSICAL THERAPIST

## 2018-04-30 NOTE — PROGRESS NOTES
PT Evaluation     Today's date: 2018  Patient name: Jelly Canela  :   MRN: [id number]  Referring provider: LEIGHA Ferrera  Dx:   Encounter Diagnosis     ICD-10-CM    1  Pelvic floor dysfunction M62 89                   Assessment  Impairments: activity intolerance, impaired physical strength and lacks appropriate home exercise program    Assessment details: Jelly Canela is a 34 y o  female who presents with concerns of urge urinary incontinence as well as nocturia  She was initially quite guarded and nervous about pelvic muscle exam, but I assured her that she was able to tell me to stop treatment at any time and also that it should not be painful  She was able to calm herself and tolerated assessment very well  She demostrates weak and non-relaxing pelvic floor muscles and would benefit from muscle training to work on breathing mechanics as well as functional activities in order to improved muscle mechanics, bladder control and hopefully behavioral strategies to decrease nocturia  Thank you for this referral [facility]  Pelvic floor anatomy was explained to patient utilizing a pelvic model and examination technique was discussed, including all risks and precautions  Patient provided verbal and written consent for internal pelvic floor muscle assessment prior to examination  Therapeutic activities this session included anatomy education and discussion of what pelvic floor therapy entails as well as goals and plan of care expectations  Understanding of Dx/Px/POC: good   Prognosis: good    Goals      * Normalize sEMG findings to indicate strength average > 12uV and resting average < 2 5uV  * Presents with improved understanding of bladder irritants in her diet and makes concerted effort to reduce them by at least 50-75%  * Patient will report that nocturia has reduced to 0-1 episodes per night by discharge     * Patient will report that she is able to stop wearing bladder control pads for leakage  * Presents with improved fluid intake to avoid concentrated and irritating urine by discharge  * Patient will be compliant with comprehensive home exercise program for self management of condition  Plan  Patient would benefit from: skilled PT  Referral necessary: No  Planned modality interventions: biofeedback  Planned therapy interventions: neuromuscular re-education, patient education, therapeutic exercise and home exercise program  Frequency: 1x week  Duration in visits: 12  Treatment plan discussed with: patient        Subjective Evaluation    History of Present Illness  Mechanism of injury: Patient reports C/C: frequently awakening at night to void- up to 3-6 times/night  Patient reports that she started 2017 PTNS x 12 weeks and now she is currently receiving 1/month through Oct 2019  She reports that her bladder urgency and frequency has improved since she started PTNS however nocturia is ongoing  Hospitalized in January for med change  OCD-cleaning, organizing and rethinking things  Exercise: not regularly  Bladder: more bladder frequency in the morning, levels out in afternoon to every 2 hours, urge leakage if full bladder, wears bladder control pads which are often dry  Hydration: coffee (12 ounces - few days week), diet green tea (3/4 gallon daily), no water  Bowels: good regularity, denies straining  OB/GYN: , regular periods, LMP = beginning of April, long cycle ~ 40 days, not sexually active    Co-morbidities: h/o anorexia nervosa in , bipolar disorder, Hospitalized in January for med change  OCD-cleaning, organizing and festering thoughts   Sees both psychologist and psychiatrist   Recurrent probem    Quality of life: good    Pain  Current pain ratin  At best pain ratin  At worst pain ratin  Location: L sided hip - ? bursitis - has Rx for PT which she may pursue when she finishes this therapy   Quality: sharp  Relieving factors: change in position    Social Support  Lives in: multiple-level home  Lives with: friend  Employment status: not working (SSD x 10 years for psychiatric disorders)    Diagnostic Tests  No diagnostic tests performed  Treatments  No previous or current treatments  Patient Goals  Patient goals for therapy: increased strength  Patient's goals regarding treatment: Decrease bladder urgency/frequency at night  Objective   Pelvic Floor Exam     External Exam   Position: supine exam    General Perineum Exam: perineum intact  Skin inspection:   no scars present  Visualization of PFM Contraction: bearing down or bulging with attempt, delayed relaxation of perineal body and voluntary contraction performed  PFM Contraction Comments: Palpation:  No tenderness reported, sensation intact both internally and externally    When cued to contract pelvic floor "like you are drinking a milkshake through a straw using your vaginal muscles" she initially engaged her abdominals and adductors  I cued her to do the same with only "50% intensity" and she was able to appropriately engage and isolate pelvic floor only  When cued to relax pelvic floor, 2-3 second delay occurred before patient demonstrated variable levels of relaxation  At times the muscles fully relaxed and lengthened but at others the pelvic floor only descended about 75%  Cotton swab test: non-tender  Cough reflex: cough reflex  Sphincter Tone Resting: normal    Pelvic Floor Muscle Control   Pelvic floor muscle relaxation is delayed and incomplete  2 seconds required for complete relaxation     Accessory muscle activity: abdominals and adductors  Muscle Contraction: overflow    PERF   Power right: 2/5  Power left: 2/5  Endurance (seconds to max): 5      Flowsheet Rows      Most Recent Value   PT/OT G-Codes   FOTO information reviewed  No   Assessment Type  Evaluation   G code set  Other PT/OT Primary   Other PT Primary Current Status ()  CK   Other PT Primary Goal Status ()  CI          Precautions: bipolar disorder, h/o eating disorder, OCD

## 2018-05-01 ENCOUNTER — OFFICE VISIT (OUTPATIENT)
Dept: BEHAVIORAL/MENTAL HEALTH CLINIC | Facility: CLINIC | Age: 33
End: 2018-05-01
Payer: MEDICARE

## 2018-05-01 ENCOUNTER — TELEPHONE (OUTPATIENT)
Dept: INTERNAL MEDICINE CLINIC | Facility: CLINIC | Age: 33
End: 2018-05-01

## 2018-05-01 DIAGNOSIS — F31.4 BIPOLAR I DISORDER, MOST RECENT EPISODE DEPRESSED, SEVERE WITHOUT PSYCHOTIC FEATURES (HCC): ICD-10-CM

## 2018-05-01 PROCEDURE — 90853 GROUP PSYCHOTHERAPY: CPT | Performed by: SOCIAL WORKER

## 2018-05-01 NOTE — TELEPHONE ENCOUNTER
Pt called in stating she believes she needs a stronger dosage on her ropinirole - she is currently on 3mg taking it every 12hrs but she is still having symptoms    She wants to know if you can send a higher dosage to her pharmacy    Please advise, ty

## 2018-05-01 NOTE — PSYCH
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

## 2018-05-02 DIAGNOSIS — G25.81 RESTLESS LEG SYNDROME: ICD-10-CM

## 2018-05-02 PROCEDURE — 87186 SC STD MICRODIL/AGAR DIL: CPT | Performed by: OTOLARYNGOLOGY

## 2018-05-02 PROCEDURE — 87070 CULTURE OTHR SPECIMN AEROBIC: CPT | Performed by: OTOLARYNGOLOGY

## 2018-05-02 PROCEDURE — 87077 CULTURE AEROBIC IDENTIFY: CPT | Performed by: OTOLARYNGOLOGY

## 2018-05-02 PROCEDURE — 87205 SMEAR GRAM STAIN: CPT | Performed by: OTOLARYNGOLOGY

## 2018-05-02 RX ORDER — PRAMIPEXOLE DIHYDROCHLORIDE 0.25 MG/1
0.25 TABLET ORAL
Qty: 30 TABLET | Refills: 2 | Status: SHIPPED | OUTPATIENT
Start: 2018-05-02 | End: 2018-05-20 | Stop reason: SDDI

## 2018-05-02 NOTE — PSYCH
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

## 2018-05-03 ENCOUNTER — TELEPHONE (OUTPATIENT)
Dept: INTERNAL MEDICINE CLINIC | Facility: CLINIC | Age: 33
End: 2018-05-03

## 2018-05-03 DIAGNOSIS — G25.81 RESTLESS LEG SYNDROME: ICD-10-CM

## 2018-05-03 RX ORDER — ROPINIROLE 4 MG/1
4 TABLET, FILM COATED ORAL EVERY 12 HOURS
Qty: 60 TABLET | Refills: 0 | Status: SHIPPED | OUTPATIENT
Start: 2018-05-03 | End: 2018-06-07 | Stop reason: SDUPTHER

## 2018-05-03 NOTE — PSYCH
Psychotherapy Provided: Group Therapy     Length of time in session: 60 minutes, follow up in 1 week    Goals addressed in session: Goal 1     Pain:      none    0    Current suicide risk : Low     1 D:  The above was seen for Group Therapy session  Group members engaged in a discussion re: friendships, vulnerability and their struggles to allow people to get close to them  A:  Elizabeth shared about her progress and struggles in this area of her life  She has made considerable progress in this area and is a leader and support to other group members with this  P : She will be encouraged to continue to attend individual therapy weekly and the Trauma group on a biweekly basis  Behavioral Health Treatment Plan [address] [place] [address] [address]: Diagnosis and Treatment Plan explained to Imelda Ramana relates understanding diagnosis and is agreeable to Treatment Plan   Yes

## 2018-05-03 NOTE — TELEPHONE ENCOUNTER
Patient is concerned about the new medication you prescribed for restless legs  Patient is afraid of the side effects and does not want to start this medication      Please advise

## 2018-05-04 ENCOUNTER — LAB REQUISITION (OUTPATIENT)
Dept: LAB | Facility: HOSPITAL | Age: 33
End: 2018-05-04
Payer: MEDICARE

## 2018-05-04 DIAGNOSIS — D38.5 NEOPLASM OF UNCERTAIN BEHAVIOR OF OTHER RESPIRATORY ORGANS: ICD-10-CM

## 2018-05-04 NOTE — PROGRESS NOTES
Daily Note     Today's date: 2018  Patient name: Cristina Moscoso  : 1985  MRN: [id number]  Referring provider: LEIGHA Love  Dx:   Encounter Diagnosis     ICD-10-CM    1  Pelvic floor dysfunction M62 89          Mechanism of injury: Patient reports C/C: frequently awakening at night to void- up to 3-6 times/night  Patient reports that she started 2017 PTNS x 12 weeks and now she is currently receiving 1/month through Oct 2019  She reports that her bladder urgency and frequency has improved since she started PTNS however nocturia is ongoing  Hospitalized in January for med change  OCD-cleaning, organizing and rethinking things  Precautions: bipolar disorder, h/o eating disorder, OCD    Subjective: Patient reports no change since IE  Tends to have leakage with supine to stand transition in the middle of the night  * Patient arrived 13' late but was accommodated  Objective: See treatment diary below    Manual                                                                                        Exercise Diary              NMR See below x 40'                                                                                                                             Biofeedback performed in supine hooklying  Patient presents with weak tone  Performed 5 second active PFM contractions followed by 10 seconds of rest for 10 repetitions  Strength on average measured 1 9 uV with the goal being > 12 0uV and resting tone measured 0 8 on average with the goal being < 2 5uV  Weak co-contractions with hip flexion and TrA  Issued HEP of 2 second holds, 5 seconds of rest to perform 10 times in supine or sitting position with emphasis on work phase  This is to be performed 3 times per day  Assessment: Tolerated treatment fair  Patient demonstrated fatigue post treatment and would benefit from continued PT   Patient was compensating with LEs and abdominals and breath holding initially when she worked on lefty pelvic floor  Verbal cuing helped this  She presents with poor holding endurance and overall marked weakness  Will progress TE next session  Plan: Continue per plan of care

## 2018-05-07 ENCOUNTER — OFFICE VISIT (OUTPATIENT)
Dept: PHYSICAL THERAPY | Facility: REHABILITATION | Age: 33
End: 2018-05-07
Payer: MEDICARE

## 2018-05-07 DIAGNOSIS — M62.89 PELVIC FLOOR DYSFUNCTION: Primary | ICD-10-CM

## 2018-05-07 PROCEDURE — 97112 NEUROMUSCULAR REEDUCATION: CPT | Performed by: PHYSICAL THERAPIST

## 2018-05-08 ENCOUNTER — OFFICE VISIT (OUTPATIENT)
Dept: BEHAVIORAL/MENTAL HEALTH CLINIC | Facility: CLINIC | Age: 33
End: 2018-05-08
Payer: MEDICARE

## 2018-05-08 DIAGNOSIS — F50.81 BINGE-EATING DISORDER, SEVERE: ICD-10-CM

## 2018-05-08 PROCEDURE — 90853 GROUP PSYCHOTHERAPY: CPT | Performed by: SOCIAL WORKER

## 2018-05-09 ENCOUNTER — PROCEDURE VISIT (OUTPATIENT)
Dept: UROLOGY | Facility: AMBULATORY SURGERY CENTER | Age: 33
End: 2018-05-09
Payer: MEDICARE

## 2018-05-09 ENCOUNTER — OFFICE VISIT (OUTPATIENT)
Dept: BEHAVIORAL/MENTAL HEALTH CLINIC | Facility: CLINIC | Age: 33
End: 2018-05-09
Payer: MEDICARE

## 2018-05-09 VITALS — DIASTOLIC BLOOD PRESSURE: 80 MMHG | HEART RATE: 64 BPM | SYSTOLIC BLOOD PRESSURE: 118 MMHG

## 2018-05-09 DIAGNOSIS — R35.0 URINARY FREQUENCY: ICD-10-CM

## 2018-05-09 DIAGNOSIS — F63.0 GAMBLING DISORDER, MODERATE: ICD-10-CM

## 2018-05-09 DIAGNOSIS — F50.81 BINGE-EATING DISORDER, SEVERE: ICD-10-CM

## 2018-05-09 DIAGNOSIS — N39.41 URGE INCONTINENCE OF URINE: ICD-10-CM

## 2018-05-09 DIAGNOSIS — F42.2 MIXED OBSESSIONAL THOUGHTS AND ACTS: ICD-10-CM

## 2018-05-09 DIAGNOSIS — N32.81 OVERACTIVE BLADDER: ICD-10-CM

## 2018-05-09 DIAGNOSIS — F31.4 BIPOLAR I DISORDER, MOST RECENT EPISODE DEPRESSED, SEVERE WITHOUT PSYCHOTIC FEATURES (HCC): ICD-10-CM

## 2018-05-09 PROCEDURE — 90834 PSYTX W PT 45 MINUTES: CPT | Performed by: SOCIAL WORKER

## 2018-05-09 PROCEDURE — 64566 NEUROELTRD STIM POST TIBIAL: CPT | Performed by: UROLOGY

## 2018-05-09 NOTE — PSYCH
Psychotherapy Provided: Group Therapy     Length of time in session: 60 minutes, follow up in 1 week    Goals addressed in session: Goal 1     Pain:      none    0    Current suicide risk : Low     1 D:  The above was seen for Group Therapy session  Group members engaged in a discussion re: their Ed history and progress in recovery with a new group member while also supporting Elizabeth who shared extensively re: her journey  A:  Elizabeth appeared surprised when another group member became tearful by what she shared as she is not used to receiving care, support in her struggle  P : She will be encouraged to continue to attend individual therapy weekly and the ED group on a biweekly basis  Behavioral Health Treatment Plan [facility]: Diagnosis and Treatment Plan explained to Nathan Para relates understanding diagnosis and is agreeable to Treatment Plan   Yes

## 2018-05-09 NOTE — PROGRESS NOTES
2018    Lucinda Damien  1985  [id number]    Diagnosis  Chief Complaint     Urinary Urgency; Urinary Incontinence; Urinary Frequency        Patient presents for PTNS 10/24 managed by Dr Awa Alfaro  Patient will follow up in one month for next treatment    Urinary Incontinence Screening      Most Recent Value   Urinary Incontinence   Urinary Incontinence? No   Incomplete emptying? No   Urinary frequency? No   Urinary urgency? No   Urinary hesitancy? No   Dysuria (painful difficult urination)? No   Nocturia (waking up to use the bathroom)? Yes [3-4]   Straining (having to push to go)? No   Weak stream?  No   Intermittent stream?  No   Post void dribbling?   No            Procedure PTNS  Session 10 of 24    Ankle used: right  Treatment settin  Duration of treatment: 30 minutes  Lead lot #:[id number]  Expiration Date:10/28/2020  Feeling/Response:toe flex and toe sensation    Patient Goals and Progress  Decreased Urgency Yes  Decreased Frequency Yes  Episodes of incontinence Yes  Sleep Through Night Yes  Related Health and Social Factors Yes        ANAHI MckeonN, BSN

## 2018-05-09 NOTE — PSYCH
Psychotherapy Provided: Individual Psychotherapy 50 minutes     Length of time in session: 50 minutes, follow up in 1 week    Goals addressed in session: Goal 1, Goal 2 and Goal 3      Pain:      none    0    Current suicide risk : Low    D: Met with Elizabeth for Individual Therapy session  Elizabeth spoke with this worker today re: her job search  She also shared several journal entries as she spoke about her progress in self acceptance and work towards self love  A:  Elizabeth again spoke very openly today  She has continued to work on [employer] issues while continuing to receive pelvic pt    P: Upcoming sessions will be used to continue to address the above  She will continue to attend both weekly Trauma and ED group therapy sessions  Behavioral Health Treatment Plan [facility]: Diagnosis and Treatment Plan explained to Ervin Cornejo relates understanding diagnosis and is agreeable to Treatment Plan   Yes

## 2018-05-13 LAB
BACTERIA WND AEROBE CULT: ABNORMAL
GRAM STN SPEC: ABNORMAL

## 2018-05-14 ENCOUNTER — OFFICE VISIT (OUTPATIENT)
Dept: PHYSICAL THERAPY | Facility: REHABILITATION | Age: 33
End: 2018-05-14
Payer: MEDICARE

## 2018-05-14 DIAGNOSIS — M62.89 PELVIC FLOOR DYSFUNCTION: Primary | ICD-10-CM

## 2018-05-14 PROCEDURE — 97110 THERAPEUTIC EXERCISES: CPT | Performed by: PHYSICAL THERAPIST

## 2018-05-14 PROCEDURE — 97112 NEUROMUSCULAR REEDUCATION: CPT | Performed by: PHYSICAL THERAPIST

## 2018-05-14 NOTE — PROGRESS NOTES
Daily Note     Today's date: 2018  Patient name: Francisca Garcia  : 1985  MRN: [id number]  Referring provider: LEIGHA Mckeon  Dx:   Encounter Diagnosis     ICD-10-CM    1  Pelvic floor dysfunction M62 89          Mechanism of injury: Patient reports C/C: frequently awakening at night to void- up to 3-6 times/night  Patient reports that she started 2017 PTNS x 12 weeks and now she is currently receiving 1/month through Oct 2019  She reports that her bladder urgency and frequency has improved since she started PTNS however nocturia is ongoing  Hospitalized in January for med change  OCD-cleaning, organizing and rethinking things  Precautions: bipolar disorder, h/o eating disorder, OCD    Subjective: Patient reports that she had PTNS last week and she has had mildly improved OAB symptoms since that time  Her emotional support dog attended session today  Objective: See treatment diary below    Manual                                                                                        Exercise Diary            NMR See below x 40'  30'                        hip adduction    with bolster x 15          hip abduction    with MGT x 15 discharged         supine march    x 15          clams    x15 each                                                     Biofeedback performed in supine hooklying  Today, muscle tone is slightly elevated (~5uV) at rest with minimal active contraction without breath holding  Patient was admittedly uncomfortable in hooklying position and we tried modifying with head lowering which helped slightly  Assessment: Tolerated treatment fair  Patient demonstrated fatigue post treatment and would benefit from continued PT  Less breath holding this session  She is having trouble engaging pelvic floor volitionally so I am focusing on overflow exercises to help  Plan: Continue per plan of care

## 2018-05-15 ENCOUNTER — OFFICE VISIT (OUTPATIENT)
Dept: BEHAVIORAL/MENTAL HEALTH CLINIC | Facility: CLINIC | Age: 33
End: 2018-05-15
Payer: MEDICARE

## 2018-05-15 DIAGNOSIS — F42.2 MIXED OBSESSIONAL THOUGHTS AND ACTS: ICD-10-CM

## 2018-05-15 DIAGNOSIS — R39.15 URINARY URGENCY: ICD-10-CM

## 2018-05-15 PROCEDURE — 90853 GROUP PSYCHOTHERAPY: CPT | Performed by: SOCIAL WORKER

## 2018-05-15 RX ORDER — OXYBUTYNIN CHLORIDE 10 MG/1
10 TABLET, EXTENDED RELEASE ORAL DAILY
Qty: 30 TABLET | Refills: 0 | Status: SHIPPED | OUTPATIENT
Start: 2018-05-15 | End: 2018-06-09 | Stop reason: SDUPTHER

## 2018-05-15 NOTE — TELEPHONE ENCOUNTER
From: Arianna Maria  Sent: 5/15/2018 3:00 AM EDT  Subject: Medication Renewal Request    Arianna Maria would like a refill of the following medications:     oxybutynin (DITROPAN-XL) 10 MG 24 hr tablet Kory Gottlieb MD]    Preferred pharmacy: [facility]ACY #[id number]    Comment:

## 2018-05-17 ENCOUNTER — APPOINTMENT (OUTPATIENT)
Dept: LAB | Facility: HOSPITAL | Age: 33
End: 2018-05-17
Attending: OTOLARYNGOLOGY
Payer: MEDICARE

## 2018-05-17 ENCOUNTER — OFFICE VISIT (OUTPATIENT)
Dept: BEHAVIORAL/MENTAL HEALTH CLINIC | Facility: CLINIC | Age: 33
End: 2018-05-17
Payer: MEDICARE

## 2018-05-17 ENCOUNTER — TRANSCRIBE ORDERS (OUTPATIENT)
Dept: LAB | Facility: HOSPITAL | Age: 33
End: 2018-05-17

## 2018-05-17 DIAGNOSIS — F42.2 MIXED OBSESSIONAL THOUGHTS AND ACTS: ICD-10-CM

## 2018-05-17 DIAGNOSIS — J30.1 ALLERGIC RHINITIS DUE TO POLLEN, UNSPECIFIED SEASONALITY: ICD-10-CM

## 2018-05-17 DIAGNOSIS — F63.0 GAMBLING DISORDER, MODERATE: ICD-10-CM

## 2018-05-17 DIAGNOSIS — F31.4 BIPOLAR I DISORDER, MOST RECENT EPISODE DEPRESSED, SEVERE WITHOUT PSYCHOTIC FEATURES (HCC): ICD-10-CM

## 2018-05-17 DIAGNOSIS — J34.89 DRY NOSE: ICD-10-CM

## 2018-05-17 DIAGNOSIS — F50.81 BINGE-EATING DISORDER, SEVERE: ICD-10-CM

## 2018-05-17 DIAGNOSIS — J34.89 DRY NOSE: Primary | ICD-10-CM

## 2018-05-17 LAB — ERYTHROCYTE [SEDIMENTATION RATE] IN BLOOD: 17 MM/HOUR (ref 0–20)

## 2018-05-17 PROCEDURE — 82785 ASSAY OF IGE: CPT

## 2018-05-17 PROCEDURE — 86430 RHEUMATOID FACTOR TEST QUAL: CPT

## 2018-05-17 PROCEDURE — 86235 NUCLEAR ANTIGEN ANTIBODY: CPT

## 2018-05-17 PROCEDURE — 36415 COLL VENOUS BLD VENIPUNCTURE: CPT

## 2018-05-17 PROCEDURE — 86038 ANTINUCLEAR ANTIBODIES: CPT

## 2018-05-17 PROCEDURE — 90834 PSYTX W PT 45 MINUTES: CPT | Performed by: SOCIAL WORKER

## 2018-05-17 PROCEDURE — 86003 ALLG SPEC IGE CRUDE XTRC EA: CPT

## 2018-05-17 PROCEDURE — 86255 FLUORESCENT ANTIBODY SCREEN: CPT

## 2018-05-17 PROCEDURE — 85652 RBC SED RATE AUTOMATED: CPT

## 2018-05-17 NOTE — PSYCH
Psychotherapy Provided: Individual Psychotherapy 50 minutes     Length of time in session: 50 minutes, follow up in 1 week    Goals addressed in session: Goal 1, Goal 2 and Goal 3      Pain:      none    0    Current suicide risk : Low    D: Met with Elizabeth for Individual Therapy session  Elizabeth spoke with this worker today re: her feelings re: the death of her roommate's dog and her parents' lack of support, both emotionally and financially  She also shared a journal entry re: her progress re: self love, acceptance  A:  Elizabeth again spoke very openly today  She has continued to work on [employer] issues but presented as tired and energy depleted today  P: Upcoming sessions will be used to continue to address the above  She will continue to attend both weekly Trauma and ED group therapy sessions  Behavioral Health Treatment Plan [facility]: Diagnosis and Treatment Plan explained to Christiano Oliver relates understanding diagnosis and is agreeable to Treatment Plan   Yes

## 2018-05-18 DIAGNOSIS — E03.9 HYPOTHYROIDISM, UNSPECIFIED TYPE: ICD-10-CM

## 2018-05-18 LAB
A ALTERNATA IGE QN: <0.1 KUA/I
A FUMIGATUS IGE QN: <0.1 KUA/I
ALLERGEN COMMENT: ABNORMAL
BERMUDA GRASS IGE QN: <0.1 KUA/I
BOXELDER IGE QN: <0.1 KUA/I
C HERBARUM IGE QN: <0.1 KUA/I
CAT DANDER IGE QN: <0.1 KUA/I
CMN PIGWEED IGE QN: <0.1 KUA/I
COMMON RAGWEED IGE QN: <0.1 KUA/I
COTTONWOOD IGE QN: <0.1 KUA/I
D FARINAE IGE QN: 0.51 KUA/I
D PTERONYSS IGE QN: 0.16 KUA/I
DOG DANDER IGE QN: <0.1 KUA/I
ENA SS-A AB SER-ACNC: <0.2 AI (ref 0–0.9)
ENA SS-B AB SER-ACNC: <0.2 AI (ref 0–0.9)
LONDON PLANE IGE QN: <0.1 KUA/I
MOUSE URINE PROT IGE QN: <0.1 KUA/I
MT JUNIPER IGE QN: <0.1 KUA/I
MUGWORT IGE QN: <0.1 KUA/I
P NOTATUM IGE QN: <0.1 KUA/I
RHEUMATOID FACT SER QL LA: NEGATIVE
ROACH IGE QN: <0.1 KUA/I
RYE IGE QN: NEGATIVE
SHEEP SORREL IGE QN: <0.1 KUA/I
SILVER BIRCH IGE QN: <0.1 KUA/I
TIMOTHY IGE QN: <0.1 KUA/I
TOTAL IGE SMQN RAST: 21.5 KU/L (ref 0–113)
WALNUT IGE QN: <0.1 KUA/I
WHITE ASH IGE QN: <0.1 KUA/I
WHITE ELM IGE QN: <0.1 KUA/I
WHITE MULBERRY IGE QN: <0.1 KUA/I
WHITE OAK IGE QN: <0.1 KUA/I

## 2018-05-18 RX ORDER — LEVOTHYROXINE SODIUM 0.1 MG/1
100 TABLET ORAL
Qty: 30 TABLET | Refills: 5 | Status: SHIPPED | OUTPATIENT
Start: 2018-05-18 | End: 2018-10-26 | Stop reason: SDUPTHER
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
## 2018-05-20 ENCOUNTER — OFFICE VISIT (OUTPATIENT)
Dept: URGENT CARE | Age: 33
End: 2018-05-20
Payer: MEDICARE

## 2018-05-20 VITALS
TEMPERATURE: 97.8 F | OXYGEN SATURATION: 99 % | DIASTOLIC BLOOD PRESSURE: 79 MMHG | RESPIRATION RATE: 20 BRPM | HEIGHT: 67 IN | WEIGHT: 293 LBS | BODY MASS INDEX: 45.99 KG/M2 | SYSTOLIC BLOOD PRESSURE: 154 MMHG | HEART RATE: 74 BPM

## 2018-05-20 DIAGNOSIS — H69.83 DYSFUNCTION OF BOTH EUSTACHIAN TUBES: Primary | ICD-10-CM

## 2018-05-20 PROBLEM — H69.93 DYSFUNCTION OF BOTH EUSTACHIAN TUBES: Status: ACTIVE | Noted: 2018-05-20

## 2018-05-20 PROCEDURE — 99213 OFFICE O/P EST LOW 20 MIN: CPT | Performed by: FAMILY MEDICINE

## 2018-05-20 PROCEDURE — G0463 HOSPITAL OUTPT CLINIC VISIT: HCPCS | Performed by: FAMILY MEDICINE

## 2018-05-20 RX ORDER — LURASIDONE HYDROCHLORIDE 80 MG/1
TABLET, FILM COATED ORAL
Refills: 1 | COMMUNITY
Start: 2018-05-07 | End: 2018-07-17 | Stop reason: SDUPTHER

## 2018-05-20 RX ORDER — CLOMIPRAMINE HYDROCHLORIDE 75 MG/1
150 CAPSULE ORAL
Refills: 1 | COMMUNITY
Start: 2018-05-07 | End: 2018-07-17 | Stop reason: SDUPTHER

## 2018-05-20 NOTE — PATIENT INSTRUCTIONS
Eustachian Tube Dysfunction   WHAT YOU NEED TO KNOW:   What is eustachian tube dysfunction? Eustachian tube dysfunction (ETD) is a condition that prevents your eustachian tubes from opening properly  It can also cause them to become blocked  Eustachian tubes connect your middle ear to the back of your nose and throat  These tubes open and allow air to flow in and out when you sneeze, swallow, or yawn  What causes or increases my risk of ETD? ETD may be caused by swelling or buildup of mucus in your eustachian tubes  Allergies, a cold, or sinus infection can increase your risk for ETD  Smoking also increases your risk for ETD  What are the signs and symptoms of ETD? · Fullness or pressure in your ears    · Muffled hearing    · Pain in one or both ears    · Ringing in your ears    · Popping or clicking feeling in your ears    · Trouble keeping your balance  How is ETD diagnosed? Your healthcare provider will ask about your symptoms  He will examine your ears, your nose, and the back of your throat  He may also do a hearing test    How is ETD treated? Your ETD may get better on its own without any treatment  You may need any of the following:  · Exercises  such as swallowing, yawning, or chewing gum may help to open your eustachian tubes  Your healthcare provider may also recommend that you take a deep breath and then blow with your mouth shut and your nostrils pinched closed  · Air pressure devices  push air into your nose and eustachian tubes to help relieve air pressure in your ear  · Treatment for allergies  such as decongestants, antihistamines, and nasal steroids may improve ETD  They may help decrease swelling of the eustachian tubes  · Ear tubes  may help to keep your middle ear open  During this procedure, your healthcare provider will cut a small hole in your eardrum  · A myringotomy  is procedure to make a small cut in your eardrum and suction out fluid from your middle ear  · Tuboplasty  is a procedure to widen your eustachian tubes  When should I contact my healthcare provider? · Your symptoms do not improve or get worse  · You have a fever  · You have any hearing loss  · You have questions or concerns about your condition or care  CARE AGREEMENT:   You have the right to help plan your care  Learn about your health condition and how it may be treated  Discuss treatment options with your caregivers to decide what care you want to receive  You always have the right to refuse treatment  The above information is an  only  It is not intended as medical advice for individual conditions or treatments  Talk to your doctor, nurse or pharmacist before following any medical regimen to see if it is safe and effective for you  © 2017 [employer]  Information is for End User's use only and may not be sold, redistributed or otherwise used for commercial purposes  All illustrations and images included in CareNotes® are the copyrighted property of [employer]  or Ceasar Alcaraz

## 2018-05-20 NOTE — PROGRESS NOTES
Assessment/Plan    Dysfunction of both eustachian tubes [H69 83]  1  Dysfunction of both eustachian tubes       -   bactrim from the pharm and take as directed  -  F/u with the ENT as directed, in two weeks and prn    Subjective: presents to the clinic with c/o R ear pain and L ear congestion     Patient ID: Francisca Garcia is a 34 y o  female  Reason For Visit / Chief Complaint  Chief Complaint   Patient presents with    Earache     bilateral ear pain since last night  HPI  This has been ongoing x 2 days  Says she had gone to an ENT recently and was prescribed bactrim DS but she has not had time to pick it up from the pharmacy  She had positive culture of proteus mirabilis, pseudomonas, and MRSA  Was ordered by ENT, for chronic sinus problems  These cultures were from 5/2/2018       Past Medical History:   Diagnosis Date    Anorexia nervosa in remission     Anxiety     Back pain     Bipolar disorder (HCC)     Depression     Hypertension     Hypothyroid     Obesity     Obsessive-compulsive disorder     Overactive bladder     Psychiatric disorder     Restless leg syndrome, controlled     Seasonal allergies     Seizure ([address]     Suicide and self-inflicted injury ([facility] [address] )        Past Surgical History:   Procedure Laterality Date    DENTAL SURGERY      SINUS ENDOSCOPY      TONSILLECTOMY         Family History   Problem Relation Age of Onset    Depression Mother     Suicide Attempts Mother     Hypertension Mother     Diabetes Mother     Other Mother      bicuspid aortic valve    Hypertension Father     Hypothyroidism Father     Mental illness Father     Hypertension Brother     Cancer Maternal Grandmother     Heart disease Maternal Grandmother     Stroke Maternal Grandmother     Pneumonia Maternal Grandfather     Cancer Paternal Grandmother     Pneumonia Paternal Grandfather     Arthritis Family     Breast cancer Family     Osteopenia Family     Osteoporosis Family     Hypertension Family     Transient ischemic attack Family        Review of Systems   HENT: Positive for congestion (sinuses), ear pain (R ear) and hearing loss (L ear)  Negative for ear discharge, facial swelling, sneezing, sore throat and trouble swallowing  Eyes: Negative for pain and discharge  Respiratory: Negative for cough, chest tightness, shortness of breath and wheezing  Cardiovascular: Negative for chest pain and palpitations  Neurological: Negative for dizziness and light-headedness  Objective:    /79   Pulse 74   Temp 97 8 °F (36 6 °C) (Temporal)   Resp 20   Ht 5' 7" (1 702 m)   Wt (!) 169 kg (373 lb)   SpO2 99%   BMI 58 42 kg/m²     Physical Exam   HENT:   Head: Normocephalic and atraumatic  Mouth/Throat: No oropharyngeal exudate  There is minimal erythema of the R ear canal and serous fluid behind the L TM  No other abnormality   Cardiovascular: Normal rate, regular rhythm and normal heart sounds  Pulmonary/Chest: Effort normal and breath sounds normal  No respiratory distress  She has no wheezes

## 2018-05-21 ENCOUNTER — OFFICE VISIT (OUTPATIENT)
Dept: PHYSICAL THERAPY | Facility: REHABILITATION | Age: 33
End: 2018-05-21
Payer: MEDICARE

## 2018-05-21 DIAGNOSIS — M62.89 PELVIC FLOOR DYSFUNCTION: Primary | ICD-10-CM

## 2018-05-21 PROCEDURE — G8990 OTHER PT/OT CURRENT STATUS: HCPCS | Performed by: PHYSICAL THERAPIST

## 2018-05-21 PROCEDURE — 97112 NEUROMUSCULAR REEDUCATION: CPT | Performed by: PHYSICAL THERAPIST

## 2018-05-21 PROCEDURE — 97110 THERAPEUTIC EXERCISES: CPT | Performed by: PHYSICAL THERAPIST

## 2018-05-21 PROCEDURE — G8991 OTHER PT/OT GOAL STATUS: HCPCS | Performed by: PHYSICAL THERAPIST

## 2018-05-21 NOTE — PROGRESS NOTES
Daily Note     Today's date: 2018  Patient name: Francisca Garcia  : 1985  MRN: [id number]  Referring provider: LEIGHA Mckeon  Dx:   Encounter Diagnosis     ICD-10-CM    1  Pelvic floor dysfunction M62 89          Mechanism of injury: Patient reports C/C: frequently awakening at night to void- up to 3-6 times/night  Patient reports that she started 2017 PTNS x 12 weeks and now she is currently receiving 1/month through Oct 2019  She reports that her bladder urgency and frequency has improved since she started PTNS however nocturia is ongoing  Hospitalized in January for med change  OCD-cleaning, organizing and rethinking things  Precautions: bipolar disorder, h/o eating disorder, OCD    Subjective: Patient reports that she awoke twice last night and this morning she could suppress her bladder for 5 minutes while her roommate was in the bathroom  She reports that she has been compliant with her HEP and feels some improved regarding strength  Objective: See treatment diary below      Exercise Diary          NMR See below x 40'  30'  30'                      hip adduction    with bolster x 15  w/ ball from pt x 15 (seated and supine)        hip abduction    with MGT x 15  2x10        supine march    x 15  2x10        clams    x15 each  2x10 each        supine knee extension      x10 fast  x10 slow                                     Biofeedback performed in supine hooklying and sitting position today  Markedly improved resting tone as well as isolation when she actively engaged pelvic floor (~3-4uV with active holds)  Assessment: Tolerated treatment well  Patient demonstrated fatigue post treatment and would benefit from continued PT  Issued additional HEP of supine knee extension in addition to what she is already doing   We discussed fact that she has one more visit and then we will likely schedule for a follow up visit in 3-4 months after she has been working independently for a while  Plan: Continue per plan of care

## 2018-05-22 ENCOUNTER — OFFICE VISIT (OUTPATIENT)
Dept: BEHAVIORAL/MENTAL HEALTH CLINIC | Facility: CLINIC | Age: 33
End: 2018-05-22
Payer: MEDICARE

## 2018-05-22 DIAGNOSIS — F50.81 BINGE-EATING DISORDER, SEVERE: ICD-10-CM

## 2018-05-22 LAB
C-ANCA TITR SER IF: NORMAL TITER
MYELOPEROXIDASE AB SER IA-ACNC: <9 U/ML (ref 0–9)
P-ANCA ATYPICAL TITR SER IF: NORMAL TITER
P-ANCA TITR SER IF: NORMAL TITER
PROTEINASE3 AB SER IA-ACNC: <3.5 U/ML (ref 0–3.5)

## 2018-05-22 PROCEDURE — 90853 GROUP PSYCHOTHERAPY: CPT | Performed by: SOCIAL WORKER

## 2018-05-24 ENCOUNTER — OFFICE VISIT (OUTPATIENT)
Dept: BEHAVIORAL/MENTAL HEALTH CLINIC | Facility: CLINIC | Age: 33
End: 2018-05-24
Payer: MEDICARE

## 2018-05-24 DIAGNOSIS — F63.0 GAMBLING DISORDER, MODERATE: ICD-10-CM

## 2018-05-24 DIAGNOSIS — F50.81 BINGE-EATING DISORDER, SEVERE: ICD-10-CM

## 2018-05-24 DIAGNOSIS — F31.4 SEVERE BIPOLAR I DISORDER, MOST RECENT EPISODE DEPRESSED (HCC): Primary | ICD-10-CM

## 2018-05-24 DIAGNOSIS — F42.2 MIXED OBSESSIONAL THOUGHTS AND ACTS: ICD-10-CM

## 2018-05-24 PROCEDURE — 90834 PSYTX W PT 45 MINUTES: CPT | Performed by: SOCIAL WORKER
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
## 2018-05-24 NOTE — PSYCH
Psychotherapy Provided: Group Therapy     Length of time in session: 60 minutes, follow up in 1 week    Goals addressed in session: Goal 1     Pain:      none    0    Current suicide risk : Low     1 D:  The above was seen for Group Therapy session  Group members engaged in a discussion re: their recent struggles with their Ed  They also discussed various types of self sabotage  A:  Elizabeth shared a journal entry with the group that she had written about her recent food issues, struggles  She was able to accept support and feedback fro group members as well  P : She will be encouraged to continue to attend individual therapy weekly and the ED group on a biweekly basis  Behavioral Health Treatment Plan [facility]: Diagnosis and Treatment Plan explained to Vanessa Almanza relates understanding diagnosis and is agreeable to Treatment Plan   Yes

## 2018-05-25 ENCOUNTER — TELEPHONE (OUTPATIENT)
Dept: BEHAVIORAL/MENTAL HEALTH CLINIC | Facility: CLINIC | Age: 33
End: 2018-05-25

## 2018-05-29 ENCOUNTER — OFFICE VISIT (OUTPATIENT)
Dept: BEHAVIORAL/MENTAL HEALTH CLINIC | Facility: CLINIC | Age: 33
End: 2018-05-29
Payer: MEDICARE

## 2018-05-29 DIAGNOSIS — F31.4 SEVERE BIPOLAR I DISORDER, MOST RECENT EPISODE DEPRESSED (HCC): ICD-10-CM

## 2018-05-29 PROCEDURE — 90853 GROUP PSYCHOTHERAPY: CPT | Performed by: SOCIAL WORKER

## 2018-05-31 ENCOUNTER — OFFICE VISIT (OUTPATIENT)
Dept: BEHAVIORAL/MENTAL HEALTH CLINIC | Facility: CLINIC | Age: 33
End: 2018-05-31
Payer: MEDICARE

## 2018-05-31 DIAGNOSIS — F50.81 BINGE-EATING DISORDER, SEVERE: ICD-10-CM

## 2018-05-31 DIAGNOSIS — F63.0 GAMBLING DISORDER, MODERATE: ICD-10-CM

## 2018-05-31 DIAGNOSIS — F31.4 SEVERE BIPOLAR I DISORDER, MOST RECENT EPISODE DEPRESSED (HCC): ICD-10-CM

## 2018-05-31 DIAGNOSIS — F42.2 MIXED OBSESSIONAL THOUGHTS AND ACTS: ICD-10-CM

## 2018-05-31 PROCEDURE — 90834 PSYTX W PT 45 MINUTES: CPT | Performed by: SOCIAL WORKER

## 2018-05-31 NOTE — PSYCH
Psychotherapy Provided: Individual Psychotherapy 50 minutes     Length of time in session: 50 minutes, follow up in 1 week    Goals addressed in session: Goal 1, Goal 2 and Goal 3      Pain:      none    0    Current suicide risk : Low    D: Met with Elizabeth for Individual Therapy session  Elizabeth spoke with this worker today re: her feelings re: the struggle she had over the past week with following her contract not to attempt to engage in contact with this worker  This was processed and boundaries were re-defined  A:  Elizabeth again spoke very openly today  She has continued to work on [employer] issues and has accepted the limits that were re-set     P: Upcoming sessions will be used to continue to address the above  She will continue to attend both weekly Trauma and ED group therapy sessions  Behavioral Health Treatment Plan [address] [place] [address] [address]: Diagnosis and Treatment Plan explained to Ifrah Sebastian relates understanding diagnosis and is agreeable to Treatment Plan   Yes
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
## 2018-06-04 ENCOUNTER — APPOINTMENT (OUTPATIENT)
Dept: PHYSICAL THERAPY | Facility: REHABILITATION | Age: 33
End: 2018-06-04
Payer: MEDICARE

## 2018-06-05 ENCOUNTER — TELEPHONE (OUTPATIENT)
Dept: UROLOGY | Facility: AMBULATORY SURGERY CENTER | Age: 33
End: 2018-06-05

## 2018-06-05 NOTE — TELEPHONE ENCOUNTER
Patient needed to cancel today due to an emergency with her dog  She said she is available between 10:30-12:30 on Thurs and all day on Friday  Please call her to reschedule   Thank you

## 2018-06-05 NOTE — TELEPHONE ENCOUNTER
Called patient and left her a message  Rescheduled to 6/7/18 at 1130  She was encouraged to call back if that appt did not work for her  PTNS 11/24

## 2018-06-06 ENCOUNTER — OFFICE VISIT (OUTPATIENT)
Dept: INTERNAL MEDICINE CLINIC | Facility: CLINIC | Age: 33
End: 2018-06-06
Payer: MEDICARE

## 2018-06-06 ENCOUNTER — OFFICE VISIT (OUTPATIENT)
Dept: PHYSICAL THERAPY | Facility: REHABILITATION | Age: 33
End: 2018-06-06
Payer: MEDICARE

## 2018-06-06 VITALS
OXYGEN SATURATION: 98 % | HEIGHT: 67 IN | HEART RATE: 78 BPM | SYSTOLIC BLOOD PRESSURE: 118 MMHG | DIASTOLIC BLOOD PRESSURE: 82 MMHG | BODY MASS INDEX: 45.99 KG/M2 | WEIGHT: 293 LBS

## 2018-06-06 DIAGNOSIS — G25.81 RESTLESS LEG SYNDROME: Primary | ICD-10-CM

## 2018-06-06 DIAGNOSIS — M62.89 PELVIC FLOOR DYSFUNCTION: Primary | ICD-10-CM

## 2018-06-06 PROCEDURE — 97140 MANUAL THERAPY 1/> REGIONS: CPT | Performed by: PHYSICAL THERAPIST

## 2018-06-06 PROCEDURE — 99213 OFFICE O/P EST LOW 20 MIN: CPT | Performed by: NURSE PRACTITIONER

## 2018-06-06 PROCEDURE — G8991 OTHER PT/OT GOAL STATUS: HCPCS | Performed by: PHYSICAL THERAPIST

## 2018-06-06 PROCEDURE — 97530 THERAPEUTIC ACTIVITIES: CPT | Performed by: PHYSICAL THERAPIST

## 2018-06-06 PROCEDURE — G8990 OTHER PT/OT CURRENT STATUS: HCPCS | Performed by: PHYSICAL THERAPIST

## 2018-06-06 RX ORDER — GABAPENTIN 600 MG/1
900 TABLET ORAL
Qty: 60 TABLET | Refills: 2 | Status: SHIPPED | OUTPATIENT
Start: 2018-06-06 | End: 2018-06-06 | Stop reason: SDUPTHER

## 2018-06-06 RX ORDER — GABAPENTIN 600 MG/1
TABLET ORAL
Qty: 60 TABLET | Refills: 2 | Status: SHIPPED | OUTPATIENT
Start: 2018-06-06 | End: 2018-09-25 | Stop reason: SDUPTHER

## 2018-06-06 NOTE — PROGRESS NOTES
Daily Note     Today's date: 2018  Patient name: Franca Molina  : 1985  MRN: [id number]  Referring provider: LEIGHA Lanier  Dx:   Encounter Diagnosis     ICD-10-CM    1  Pelvic floor dysfunction M62 89          Mechanism of injury: Patient reports C/C: frequently awakening at night to void- up to 3-6 times/night  Patient reports that she started 2017 PTNS x 12 weeks and now she is currently receiving 1/month through Oct 2019  She reports that her bladder urgency and frequency has improved since she started PTNS however nocturia is ongoing  Hospitalized in January for med change  OCD-cleaning, organizing and rethinking things  Precautions: bipolar disorder, h/o eating disorder, OCD    Subjective: Patient reports that she hasn't had an issue since she had her last therapy visit with leakage in the middle of the night  She has reduced nocturia to 2-3/night from 3-6 times nightly  She feels improved with pelvic floor strength  Objective: See treatment diary below      Exercise Diary     6     NMR See below x 40'  30'  30'  attempted x 20'                    hip adduction    with bolster x 15  w/ ball from pt x 15 (seated and supine)  VR      hip abduction    with MGT x 15  2x10  VR      supine march    x 15  2x10  VR      clams    x15 each  2x10 each  VR      supine knee extension      x10 fast  x10 slow  VR      SLR        2X10                    * VR = verbally reviewed     Biofeedback attempted however equipment failure precluded obtaining accurate measurements  MT for pfm cuing and assessment x 20'    Assessment: Tolerated treatment well  I was unable to obtain measurements with biofeedback so I performed a manual PFM assessment  Patient is able to accurately contract her PFM without breath holding or substitution from global stabilizers however there is some delay upon volitional relaxation   Given good progress to date and with discussion with patient, we will defer further visits until 3 mos from now, during which patient will be working on her HEP independently  We reviewed all exercises and I issued SLR as an additional exercise  We discussed that she should work to 3 sets of 10 reps with all exercises  She expressed understanding  Plan: Continue per plan of care  Patient will need a new Rx to return in 3 mos due to [address] coverage

## 2018-06-07 ENCOUNTER — OFFICE VISIT (OUTPATIENT)
Dept: BEHAVIORAL/MENTAL HEALTH CLINIC | Facility: CLINIC | Age: 33
End: 2018-06-07
Payer: MEDICARE

## 2018-06-07 ENCOUNTER — PROCEDURE VISIT (OUTPATIENT)
Dept: UROLOGY | Facility: AMBULATORY SURGERY CENTER | Age: 33
End: 2018-06-07
Payer: MEDICARE

## 2018-06-07 VITALS — HEIGHT: 67 IN | HEART RATE: 86 BPM | BODY MASS INDEX: 58.58 KG/M2

## 2018-06-07 DIAGNOSIS — F63.0 GAMBLING DISORDER, MODERATE: ICD-10-CM

## 2018-06-07 DIAGNOSIS — F50.81 BINGE-EATING DISORDER, SEVERE: ICD-10-CM

## 2018-06-07 DIAGNOSIS — F42.2 MIXED OBSESSIONAL THOUGHTS AND ACTS: ICD-10-CM

## 2018-06-07 DIAGNOSIS — G25.81 RESTLESS LEG SYNDROME: ICD-10-CM

## 2018-06-07 DIAGNOSIS — R32 URINARY INCONTINENCE, UNSPECIFIED TYPE: Primary | ICD-10-CM

## 2018-06-07 DIAGNOSIS — F31.4 SEVERE BIPOLAR I DISORDER, MOST RECENT EPISODE DEPRESSED (HCC): ICD-10-CM

## 2018-06-07 PROCEDURE — 64566 NEUROELTRD STIM POST TIBIAL: CPT | Performed by: UROLOGY

## 2018-06-07 PROCEDURE — 90834 PSYTX W PT 45 MINUTES: CPT | Performed by: SOCIAL WORKER

## 2018-06-07 RX ORDER — ROPINIROLE 4 MG/1
TABLET, FILM COATED ORAL
Qty: 60 TABLET | Refills: 0 | Status: SHIPPED | OUTPATIENT
Start: 2018-06-07 | End: 2018-07-06 | Stop reason: SDUPTHER

## 2018-06-07 NOTE — PROGRESS NOTES
Asenath Kehr is a 34 y o  female  Chief Complaint     PTNS        6/7/2018    Asenath Kehr  1985  [id number]    Diagnosis  Chief Complaint     PTNS         Patient presents for PTNS 11 of 24 managed by Dr Gemma Smith  Return 7/11/18 for next PTNS and visit with Dr Chico Curtis    Urinary Incontinence Screening      Most Recent Value   Urinary Incontinence   Urinary Incontinence? No   Incomplete emptying? No   Urinary frequency? Yes [only in AM]   Urinary urgency? No   Urinary hesitancy? No   Dysuria (painful difficult urination)? No   Nocturia (waking up to use the bathroom)? Yes [2-3 times per night]   Straining (having to push to go)? No   Weak stream?  No   Intermittent stream?  No   Post void dribbling? No            Procedure PTNS  Session 11 of 24    Ankle used: right  Treatment setting:  Duration of treatment: 30 minutes  Lead lot #:[id number]  Expiration Date:10/28/20  Feeling/Response:felt at site of needle           Posterior Tibial Nerve Stimulation     Date/Time 6/7/2018 11:47 AM     Performed by  Mariann Joel     Authorized by Sybil Fernandez       Consent: Verbal consent obtained                     Brannon Latham

## 2018-06-07 NOTE — PSYCH
Psychotherapy Provided: Individual Psychotherapy 50 minutes     Length of time in session: 50 minutes, follow up in 1 week    Goals addressed in session: Goal 1, Goal 2 and Goal 3      Pain:      none    0    Current suicide risk : Low    D: Met with Elizabeth for Individual Therapy session  Elizabeth spoke with this worker today re: her feelings re: how her last session ended and how she is feeling re: he relationship with her roommate     A:  Elizabeth again spoke very openly today  She has continued to work on boundary issues and has accepted the limits that were re-set     P: Upcoming sessions will be used to continue to address the above  She will continue to attend both weekly Trauma and ED group therapy sessions  Behavioral Health Treatment Plan [address] [place] [address] [address]: Diagnosis and Treatment Plan explained to Fawn Adorno relates understanding diagnosis and is agreeable to Treatment Plan   Yes

## 2018-06-08 NOTE — PROGRESS NOTES
Assessment/Plan:       Diagnoses and all orders for this visit:    Restless leg syndrome  -     Discontinue: gabapentin (NEURONTIN) 600 MG tablet; Take 1 5 tablets (900 mg total) by mouth daily at bedtime  -     gabapentin (NEURONTIN) 600 MG tablet; Take two tabs qhs    Other orders  -     lurasidone (LATUDA) 80 mg tablet; Take 20 mg by mouth daily with breakfast          Subjective:      Patient ID: Iglesia Mccartney is a 34 y o  female  Patient is here for restless leg syndrome  Wants to increase gabapentin at night  It does help  Now taking 900mg qhs        The following portions of the patient's history were reviewed and updated as appropriate: allergies, current medications, past family history, past medical history, past social history, past surgical history and problem list     Review of Systems   Constitutional: Negative  HENT: Negative  Eyes: Negative  Respiratory: Negative  Cardiovascular: Negative  Gastrointestinal: Negative  Musculoskeletal: Negative  Neurological: Negative  Objective:      /82 (BP Location: Left arm, Patient Position: Sitting, Cuff Size: Adult) Comment (BP Location): forearm  Pulse 78   Ht 5' 7" (1 702 m)   Wt (!) 170 kg (374 lb)   LMP 05/06/2018 (Approximate)   SpO2 98%   BMI 58 58 kg/m²          Physical Exam   Constitutional: She is oriented to person, place, and time  She appears well-developed and well-nourished  HENT:   Head: Normocephalic and atraumatic  Eyes: Conjunctivae are normal  Pupils are equal, round, and reactive to light  Neck: Normal range of motion  Neck supple  Cardiovascular: Normal rate and regular rhythm  Pulmonary/Chest: Effort normal and breath sounds normal    Abdominal: Soft  Bowel sounds are normal    Musculoskeletal: Normal range of motion  Neurological: She is alert and oriented to person, place, and time  Skin: Skin is warm and dry

## 2018-06-09 DIAGNOSIS — T78.40XA ALLERGIC DISORDER, INITIAL ENCOUNTER: ICD-10-CM

## 2018-06-09 DIAGNOSIS — R39.15 URINARY URGENCY: ICD-10-CM

## 2018-06-09 RX ORDER — OXYBUTYNIN CHLORIDE 10 MG/1
TABLET, EXTENDED RELEASE ORAL
Qty: 30 TABLET | Refills: 0 | Status: SHIPPED | OUTPATIENT
Start: 2018-06-09 | End: 2018-08-02 | Stop reason: ALTCHOICE

## 2018-06-11 RX ORDER — MONTELUKAST SODIUM 10 MG/1
10 TABLET ORAL
Qty: 30 TABLET | Refills: 2 | Status: SHIPPED | OUTPATIENT
Start: 2018-06-11 | End: 2018-09-03 | Stop reason: SDUPTHER

## 2018-06-12 ENCOUNTER — TELEPHONE (OUTPATIENT)
Dept: INTERNAL MEDICINE CLINIC | Facility: CLINIC | Age: 33
End: 2018-06-12

## 2018-06-12 ENCOUNTER — OFFICE VISIT (OUTPATIENT)
Dept: BEHAVIORAL/MENTAL HEALTH CLINIC | Facility: CLINIC | Age: 33
End: 2018-06-12
Payer: MEDICARE

## 2018-06-12 DIAGNOSIS — F31.4 SEVERE BIPOLAR I DISORDER, MOST RECENT EPISODE DEPRESSED (HCC): ICD-10-CM

## 2018-06-12 PROCEDURE — 90853 GROUP PSYCHOTHERAPY: CPT | Performed by: SOCIAL WORKER

## 2018-06-12 NOTE — TELEPHONE ENCOUNTER
I gave the message  She had a question about how to split the dosage  I spoke to [facility] who said she can take the 900 at night and the rest in the morning since that is how her pills are prescribed

## 2018-06-13 ENCOUNTER — OFFICE VISIT (OUTPATIENT)
Dept: BEHAVIORAL/MENTAL HEALTH CLINIC | Facility: CLINIC | Age: 33
End: 2018-06-13
Payer: MEDICARE

## 2018-06-13 DIAGNOSIS — F50.81 BINGE-EATING DISORDER, SEVERE: ICD-10-CM

## 2018-06-13 DIAGNOSIS — F63.0 GAMBLING DISORDER, MODERATE: ICD-10-CM

## 2018-06-13 DIAGNOSIS — F42.2 MIXED OBSESSIONAL THOUGHTS AND ACTS: ICD-10-CM

## 2018-06-13 PROCEDURE — 90834 PSYTX W PT 45 MINUTES: CPT | Performed by: SOCIAL WORKER

## 2018-06-14 NOTE — PSYCH
Psychotherapy Provided: Individual Psychotherapy 50 minutes     Length of time in session: 50 minutes, follow up in 1 week    Goals addressed in session: Goal 1, Goal 2 and Goal 3      Pain:      none    0    Current suicide risk : Low    D: Met with Elizabeth for Individual Therapy session  Elizabeth spoke with this worker today re: her feelings about writing letters / lists of what she wants to address in her therapy sessions  She indicated that doing so is preventing her from "getting deep" in her sessions  A:  Elizabeth was not effective in utilizing her session today without her list   She became argumentative about what was discussed in her last session and continues to want boundaries to be altered  She did not respond well to this worker's limit setting when the session time ended, stating she thought she might "cut herself"  Coping strategies were discussed  P: Upcoming sessions will be used to continue to address the above  She will continue to attend both weekly Trauma and ED group therapy sessions  Limits for contact outside of the therapy sessions will continue to be enforced  Behavioral Health Treatment Plan [address] [place] [address] [address]: Diagnosis and Treatment Plan explained to Levy Martinez relates understanding diagnosis and is agreeable to Treatment Plan   Yes
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
## 2018-06-19 ENCOUNTER — OFFICE VISIT (OUTPATIENT)
Dept: BEHAVIORAL/MENTAL HEALTH CLINIC | Facility: CLINIC | Age: 33
End: 2018-06-19
Payer: MEDICARE

## 2018-06-19 DIAGNOSIS — F50.81 BINGE-EATING DISORDER, SEVERE: ICD-10-CM

## 2018-06-19 PROCEDURE — 90853 GROUP PSYCHOTHERAPY: CPT | Performed by: SOCIAL WORKER

## 2018-06-21 ENCOUNTER — OFFICE VISIT (OUTPATIENT)
Dept: BEHAVIORAL/MENTAL HEALTH CLINIC | Facility: CLINIC | Age: 33
End: 2018-06-21
Payer: MEDICARE

## 2018-06-21 DIAGNOSIS — F50.81 BINGE-EATING DISORDER, SEVERE: ICD-10-CM

## 2018-06-21 DIAGNOSIS — F63.0 GAMBLING DISORDER, MODERATE: ICD-10-CM

## 2018-06-21 DIAGNOSIS — F31.4 SEVERE BIPOLAR I DISORDER, MOST RECENT EPISODE DEPRESSED (HCC): ICD-10-CM

## 2018-06-21 DIAGNOSIS — F42.2 MIXED OBSESSIONAL THOUGHTS AND ACTS: ICD-10-CM

## 2018-06-21 PROCEDURE — 90834 PSYTX W PT 45 MINUTES: CPT | Performed by: SOCIAL WORKER

## 2018-06-21 NOTE — PSYCH
Date of Initial Treatment Plan:7 /31/2008  Date of Current Treatment Plan: 6/21/18     Treatment Plan Number 21      Strengths/Personal Resources for Self Care: Determined, intelligent, gets along well with others     Diagnosis:   1  Bipolar I disorder, most recent episode depressed, severe without psychotic features ([facility] [address] )      2  Mixed obsessional thoughts and acts      3  Gambling disorder, moderate      4  Binge-eating disorder, severe            Area of Needs: I need to distance myself from my parents  I need to continue to manage my OCD  I need to work on my Ed           Long Term Goal 1: AI want to be independent       Target Date:10/21/18  Completion Date: n/a         Short Term Objectives for Goal 1: AI will work on finding a part time job, BI will find healthy behaviors other than online shopping and CI will go to unknown places despite fears or excuses     Long Term Goal 2: I want to continue to challenge my irrational and illogical thoughts       Target Date: 10/2118  Completion Date: N/A     Short Term Objectives for Goal 2: AI will use the [employer] petra and BI will follow the therapy rules          Long Term Goal # 3: I want to accept myself as I am proud of      Target Date:10/21/18  Completion Date: N/A     Short Term Objectives for Goal 3: AI will tell myself that I am more than a number on the scale and BI will limit the gifts I give others as I learn about relatonships and transference     GOAL 1: Modality: Individual 4x per month   Completion Date n/a, Group and The person(s) responsible for carrying out the plan is  Elizabeth     GOAL 2: Modality: Individual 4x per month   Completion Date n/a, Group and The person(s) responsible for carrying out the plan is  Elizabeth      GOAL 3: Modality: Individual 4x per month   Completion Date n/a, Group and The person(s) responsible for carrying out the plan is  Jayme [address]: Diagnosis and Treatment Plan explained to Julián Limb relates understanding diagnosis and is agreeable to Treatment Plan          Client Comments : Please share your thoughts, feelings, need and/or experiences regarding your treatment plan:         __________________________________________________________________     __________________________________________________________________     __________________________________________________________________     __________________________________________________________________     _______________________________________                     Patient signature, Date Time: __________________________________________               Physician cosigner signature, Date, Time: ________________________________

## 2018-06-21 NOTE — PSYCH
Psychotherapy Provided: Individual Psychotherapy 50 minutes     Length of time in session: 50 minutes, follow up in 1 week    Goals addressed in session: Goal 1, Goal 2 and Goal 3      Pain:      none    0    Current suicide risk : Moderate    D: Met with Elizabeth for Individual Therapy session  Elizabeth  spoke with this worker today re: her feelings about her frustration with / towards this worker, her mother, and her current medication regimen  A:  Elizabeth struggled again in utilizing her session for the purpose the intended as she continued to push limits, express frustration with boundaries that have been set re: sessions  She again did not respond well to this worker's limit setting when the session time ended, stating she has been having thoughts of suicidality and cutting  Coping strategies were once again provided including the use of suicide hotlines and returning to [facility] or Inpatient  Elizabeth was able to state a plan to go to work today and to continue to be safe  P: Upcoming sessions will be used to continue to address the above  She will continue to attend both weekly Trauma and ED group therapy sessions  Limits for contact outside of the therapy sessions will continue to be enforced  Behavioral Health Treatment Plan [facility]: Diagnosis and Treatment Plan explained to Levy Martinez relates understanding diagnosis and is agreeable to Treatment Plan   Yes

## 2018-06-21 NOTE — PSYCH
Treatment Plan Tracking    # 1Treatment Plan not completed within required time limits due to: Client presented with emotional/behavorial issues that required clinical intervention  Natty Bradley

## 2018-06-21 NOTE — PSYCH
Psychotherapy Provided: Group Therapy      Length of time in session: 60 minutes, follow up in 1 week     Goals addressed in session: Goal 1      Pain:       none     0     Current suicide risk : Low      1 D:  The above was seen for Group Therapy session  Group members engaged in a discussion re: Binge Eating, food addiction, and food obsession  A productive discussion ensued  A:  Elizabeth attempted to seek out this worker's individual attention during today's group as she brought up issues that were discussed and shared during individual therapy sessions P : She will be encouraged to continue to attend individual therapy weekly and the ED group on a biweekly basis  Boundaries will be re-addressed and continue to be maintained    [address]: Diagnosis and Treatment Plan explained to Connielatisha Kwabena relates understanding diagnosis and is agreeable to Treatment Plan   Yes

## 2018-06-25 ENCOUNTER — TELEPHONE (OUTPATIENT)
Dept: UROLOGY | Facility: HOSPITAL | Age: 33
End: 2018-06-25

## 2018-06-25 NOTE — TELEPHONE ENCOUNTER
Maryruth Pallas called for bladder diary to be emailed to her for her up coming appointment - sent to [email or web address]  com

## 2018-07-03 ENCOUNTER — OFFICE VISIT (OUTPATIENT)
Dept: BEHAVIORAL/MENTAL HEALTH CLINIC | Facility: CLINIC | Age: 33
End: 2018-07-03
Payer: MEDICARE

## 2018-07-03 DIAGNOSIS — F50.81 BINGE-EATING DISORDER, SEVERE: ICD-10-CM

## 2018-07-03 PROCEDURE — 90853 GROUP PSYCHOTHERAPY: CPT | Performed by: SOCIAL WORKER

## 2018-07-05 ENCOUNTER — OFFICE VISIT (OUTPATIENT)
Dept: BEHAVIORAL/MENTAL HEALTH CLINIC | Facility: CLINIC | Age: 33
End: 2018-07-05
Payer: MEDICARE

## 2018-07-05 DIAGNOSIS — F42.2 MIXED OBSESSIONAL THOUGHTS AND ACTS: ICD-10-CM

## 2018-07-05 DIAGNOSIS — I10 ESSENTIAL HYPERTENSION: ICD-10-CM

## 2018-07-05 DIAGNOSIS — F50.81 BINGE-EATING DISORDER, SEVERE: ICD-10-CM

## 2018-07-05 DIAGNOSIS — F31.4 SEVERE BIPOLAR I DISORDER, MOST RECENT EPISODE DEPRESSED (HCC): ICD-10-CM

## 2018-07-05 DIAGNOSIS — F63.0 GAMBLING DISORDER, MODERATE: ICD-10-CM

## 2018-07-05 PROCEDURE — 90834 PSYTX W PT 45 MINUTES: CPT | Performed by: SOCIAL WORKER

## 2018-07-05 RX ORDER — LISINOPRIL 10 MG/1
10 TABLET ORAL DAILY
Qty: 30 TABLET | Refills: 5 | Status: SHIPPED | OUTPATIENT
Start: 2018-07-05 | End: 2018-08-29 | Stop reason: ALTCHOICE

## 2018-07-05 NOTE — PSYCH
Date of Initial Treatment Plan: /31/20087  Date of Current Treatment Plan: 07/05/18    Treatment Plan Number 21     Strengths/Personal Resources for Self Care: Determined, intelligent, gets along well with others    Diagnosis:   BED, OCD, Bipolar Disorder, Gambling D/O    Area of Needs: I need to continue distance myself from my parents  I need to continue to manage my OCD  I need to work on my Ed  Long Term Goal 1: AI want to be financially stable, independent  Target Date:11/5/18  Completion Date: n/a         Short Term Objectives for Goal 1: AI will maintain a part time job, BI will stick to necessary purchases and CI will continue to go to unknown places despite fears or excuses    Long Term Goal 2: I want to feel more confident and trusting of myself and my decisions  Target Date:11/5/18  Completion Date: N/A    Short Term Objectives for Goal 2: AI will journal, BI will follow the rules of therapy  and CI will decrease my need for validation by listening to my inner self  Long Term Goal # 3: I want to accept myself as I am and be proud of her     Target Date:11/5/18   Completion Date: N/A    Short Term Objectives for Goal 3: AI will tell myself that I am more than a number on the scale, BI will limit the gifts I give others as I learn about relatonships and transference and CI will decrease my nighttime activities       GOAL 1: Modality: Individual 4x per month   Completion Date n/a, Group and The person(s) responsible for carrying out the plan is  [facility]    GOAL 2: Modality: Individual 4x per month   Completion Date na, Group and The person(s) responsible for carrying out the plan is  [facility]     GOAL 3: Modality: Individual 4x per month   Completion Date n/a, Group and The person(s) responsible for carrying out the plan is  [employer]: Diagnosis and Treatment Plan explained to Elsa Steiner relates understanding diagnosis and is agreeable to Treatment Plan         Client Comments : Please share your thoughts, feelings, need and/or experiences regarding your treatment plan:       __________________________________________________________________    __________________________________________________________________    __________________________________________________________________      Patient signature, Date Time: __________________________________________             Physician cosigner signature, Date, Time: ________________________________

## 2018-07-05 NOTE — PSYCH
Psychotherapy Provided: Individual Psychotherapy 50 minutes     Length of time in session: 50 minutes, follow up in 1 week    Goals addressed in session: Goal 1, Goal 2 and Goal 3      Pain:      none    0    Current suicide risk : Moderate    D: Met with Elizabeth for Individual Therapy session  Elizabeth  spoke with this worker today re: her journal entries since her last session  She verbalized her desire to maintain therapy rules  Her Treatment Plan goals were reviewed and updated  A:  Elizabeth struggled much less today in utilizing her session for the purpose the intended and did not require limit setting by this worker today  P: Upcoming sessions will be used to continue to address the above  She will continue to attend both weekly Trauma and ED group therapy sessions  Limits for contact outside of the therapy sessions will continue to be enforced  Behavioral Health Treatment Plan [facility]: Diagnosis and Treatment Plan explained to Chastity Pena relates understanding diagnosis and is agreeable to Treatment Plan   Yes

## 2018-07-05 NOTE — PSYCH
Psychotherapy Provided: Group Therapy      Length of time in session: 60 minutes, follow up in 1 week     Goals addressed in session: Goal 1      Pain:       none     0     Current suicide risk : Low       1 D:  The above was seen for Group Therapy session  Group members engaged in a discussion re: what it would take to declare independence from dieting  A:  Elizabeth  Was actively involved in today's group session  P : She will be encouraged to continue to attend individual therapy weekly and the ED group on a biweekly basis   [address] [facility]: Diagnosis and Treatment Plan explained to Quang Agarwal relates understanding diagnosis and is agreeable to Treatment Plan   Yes

## 2018-07-06 DIAGNOSIS — G25.81 RESTLESS LEG SYNDROME: ICD-10-CM

## 2018-07-07 ENCOUNTER — HOSPITAL ENCOUNTER (EMERGENCY)
Facility: HOSPITAL | Age: 33
Discharge: HOME | End: 2018-07-07
Attending: EMERGENCY MEDICINE | Admitting: EMERGENCY MEDICINE
Payer: MEDICARE

## 2018-07-07 VITALS
WEIGHT: 293 LBS | BODY MASS INDEX: 45.99 KG/M2 | HEIGHT: 67 IN | HEART RATE: 70 BPM | SYSTOLIC BLOOD PRESSURE: 114 MMHG | OXYGEN SATURATION: 99 % | DIASTOLIC BLOOD PRESSURE: 70 MMHG | TEMPERATURE: 97.9 F | RESPIRATION RATE: 18 BRPM

## 2018-07-07 DIAGNOSIS — E86.0 DEHYDRATION: Primary | ICD-10-CM

## 2018-07-07 LAB
ALBUMIN SERPL BCP-MCNC: 3.4 G/DL (ref 3.5–5)
ALP SERPL-CCNC: 90 U/L (ref 46–116)
ALT SERPL W P-5'-P-CCNC: 23 U/L (ref 12–78)
ANION GAP SERPL CALCULATED.3IONS-SCNC: 3 MMOL/L (ref 4–13)
AST SERPL W P-5'-P-CCNC: 17 U/L (ref 5–45)
BASOPHILS # BLD AUTO: 0.04 THOUSANDS/ΜL (ref 0–0.1)
BASOPHILS NFR BLD AUTO: 0 % (ref 0–1)
BILIRUB SERPL-MCNC: 0.21 MG/DL (ref 0.2–1)
BILIRUB UR QL STRIP: NEGATIVE
BUN SERPL-MCNC: 12 MG/DL (ref 5–25)
CALCIUM SERPL-MCNC: 8.6 MG/DL (ref 8.3–10.1)
CHLORIDE SERPL-SCNC: 105 MMOL/L (ref 100–108)
CK SERPL-CCNC: 103 U/L (ref 26–192)
CLARITY UR: CLEAR
CO2 SERPL-SCNC: 28 MMOL/L (ref 21–32)
COLOR UR: YELLOW
COLOR, POC: NORMAL
CREAT SERPL-MCNC: 0.69 MG/DL (ref 0.6–1.3)
EOSINOPHIL # BLD AUTO: 0.37 THOUSAND/ΜL (ref 0–0.61)
EOSINOPHIL NFR BLD AUTO: 4 % (ref 0–6)
ERYTHROCYTE [DISTWIDTH] IN BLOOD BY AUTOMATED COUNT: 13.5 % (ref 11.6–15.1)
EXT PREG TEST URINE: NORMAL
GFR SERPL CREATININE-BSD FRML MDRD: 115 ML/MIN/1.73SQ M
GLUCOSE SERPL-MCNC: 84 MG/DL (ref 65–140)
GLUCOSE UR STRIP-MCNC: NEGATIVE MG/DL
HCT VFR BLD AUTO: 39.5 % (ref 34.8–46.1)
HGB BLD-MCNC: 12.4 G/DL (ref 11.5–15.4)
HGB UR QL STRIP.AUTO: NEGATIVE
IMM GRANULOCYTES # BLD AUTO: 0.03 THOUSAND/UL (ref 0–0.2)
IMM GRANULOCYTES NFR BLD AUTO: 0 % (ref 0–2)
KETONES UR STRIP-MCNC: NEGATIVE MG/DL
LEUKOCYTE ESTERASE UR QL STRIP: NEGATIVE
LYMPHOCYTES # BLD AUTO: 3.03 THOUSANDS/ΜL (ref 0.6–4.47)
LYMPHOCYTES NFR BLD AUTO: 29 % (ref 14–44)
MCH RBC QN AUTO: 27.1 PG (ref 26.8–34.3)
MCHC RBC AUTO-ENTMCNC: 31.4 G/DL (ref 31.4–37.4)
MCV RBC AUTO: 86 FL (ref 82–98)
MONOCYTES # BLD AUTO: 0.69 THOUSAND/ΜL (ref 0.17–1.22)
MONOCYTES NFR BLD AUTO: 7 % (ref 4–12)
NEUTROPHILS # BLD AUTO: 6.19 THOUSANDS/ΜL (ref 1.85–7.62)
NEUTS SEG NFR BLD AUTO: 60 % (ref 43–75)
NITRITE UR QL STRIP: NEGATIVE
NRBC BLD AUTO-RTO: 0 /100 WBCS
PH UR STRIP.AUTO: 6.5 [PH] (ref 4.5–8)
PLATELET # BLD AUTO: 340 THOUSANDS/UL (ref 149–390)
PMV BLD AUTO: 9.7 FL (ref 8.9–12.7)
POTASSIUM SERPL-SCNC: 4.1 MMOL/L (ref 3.5–5.3)
PROT SERPL-MCNC: 7.1 G/DL (ref 6.4–8.2)
PROT UR STRIP-MCNC: NEGATIVE MG/DL
RBC # BLD AUTO: 4.57 MILLION/UL (ref 3.81–5.12)
SODIUM SERPL-SCNC: 136 MMOL/L (ref 136–145)
SP GR UR STRIP.AUTO: 1.02 (ref 1–1.03)
TSH SERPL DL<=0.05 MIU/L-ACNC: 1.51 UIU/ML (ref 0.36–3.74)
UROBILINOGEN UR QL STRIP.AUTO: 0.2 E.U./DL
WBC # BLD AUTO: 10.35 THOUSAND/UL (ref 4.31–10.16)

## 2018-07-07 PROCEDURE — 85025 COMPLETE CBC W/AUTO DIFF WBC: CPT | Performed by: EMERGENCY MEDICINE

## 2018-07-07 PROCEDURE — 80053 COMPREHEN METABOLIC PANEL: CPT | Performed by: EMERGENCY MEDICINE

## 2018-07-07 PROCEDURE — 84443 ASSAY THYROID STIM HORMONE: CPT | Performed by: EMERGENCY MEDICINE

## 2018-07-07 PROCEDURE — 99284 EMERGENCY DEPT VISIT MOD MDM: CPT

## 2018-07-07 PROCEDURE — 36415 COLL VENOUS BLD VENIPUNCTURE: CPT | Performed by: EMERGENCY MEDICINE

## 2018-07-07 PROCEDURE — 82550 ASSAY OF CK (CPK): CPT | Performed by: EMERGENCY MEDICINE

## 2018-07-07 PROCEDURE — 96360 HYDRATION IV INFUSION INIT: CPT

## 2018-07-07 PROCEDURE — 81025 URINE PREGNANCY TEST: CPT | Performed by: EMERGENCY MEDICINE

## 2018-07-07 PROCEDURE — 81003 URINALYSIS AUTO W/O SCOPE: CPT

## 2018-07-07 RX ADMIN — SODIUM CHLORIDE 1000 ML: 0.9 INJECTION, SOLUTION INTRAVENOUS at 17:50

## 2018-07-07 NOTE — ED PROVIDER NOTES
History  Chief Complaint   Patient presents with    Facial Numbness     Pt "I have been feeling weird and spacey since I woke up this morning, 0830  I feel like my face is not totally with it  I went to work but they made me come here  I have also been having a lot of psy medication changes in the last month"      23-year-old woman with a history of hypothyroidism, anxiety, depression, and hypertension presents for evaluation of multiple complaints  Patient says she has been feeling off for the past 1 day which she describes as a spacey feeling and confusion  She was at work today when her coworkers noticed that she was not acting herself and sent her in for evaluation  At that time she touched her face and noticed that it did not feel normal  Patient says her urine has been very dark and she works outside in the heat  Recent medication changes include discontinuation of lithium and decrease in Lamictal dose  No recent falls or head trauma  No fevers, chills, or neck stiffness  No alcohol or drug use  No other symptoms including focal weakness, posterior circulation symptoms, or seizure activity  On arrival, patient is afebrile with otherwise normal vital signs  Physical exam is unremarkable including a nonfocal neurologic exam   Unclear etiology of patient's symptoms  No indication for brain imaging at this time  Will check CBC, CMP, urine dip, TSH, and CK  Will give IV fluids and reassess  Prior to Admission Medications   Prescriptions Last Dose Informant Patient Reported? Taking?    Cholecalciferol (VITAMIN D3) 5000 units CAPS  Self Yes No   Sig: Take 5,000 Units by mouth daily   HYDROcodone-acetaminophen (NORCO) 5-325 mg per tablet  Self Yes No   Sig: Take 1 tablet by mouth 2 (two) times a day as needed for pain (severe pain)   LATUDA 80 MG tablet  Self Yes No   Si TABLET AT BEDTIME   LORazepam (ATIVAN) 1 mg tablet  Self Yes No   Sig: Take 1 mg by mouth 3 (three) times a day as needed for anxiety   Mirabegron ER 50 MG TB24   No No   Sig: Take 1 tablet by mouth daily for 30 days   Multiple Vitamin (MULTIVITAMIN) capsule  Self Yes No   Sig: Take 1 capsule by mouth daily   clomiPRAMINE (ANAFRANIL) 50 mg capsule  Self Yes No   Sig: Take 200 mg by mouth 2 (two) times a day     clomiPRAMINE (ANAFRANIL) 75 MG capsule  Self Yes No   Sig: Take 150 mg by mouth daily at bedtime   gabapentin (NEURONTIN) 600 MG tablet  Self No No   Sig: Take two tabs qhs   lamoTRIgine (LaMICtal) 150 MG tablet   No No   Sig: Take 2 tablets by mouth daily at bedtime for 30 days   levothyroxine 100 mcg tablet  Self No No   Sig: Take 1 tablet (100 mcg total) by mouth daily in the early morning for 30 days   lisinopril (ZESTRIL) 10 mg tablet   No No   Sig: Take 1 tablet (10 mg total) by mouth daily   lithium carbonate (LITHOBID) 450 mg CR tablet  Self Yes No   Sig: Take 900 mg by mouth daily at bedtime Take 1 5 tablets    lurasidone (LATUDA) 120 mg tablet  Self No No   Sig: Take 1 tablet by mouth daily with dinner for 30 days   Patient taking differently: Take 80 mg by mouth daily with dinner     lurasidone (LATUDA) 80 mg tablet  Self Yes No   Sig: Take 20 mg by mouth daily with breakfast   montelukast (SINGULAIR) 10 mg tablet   No No   Sig: TAKE 1 TABLET (10 MG TOTAL) BY MOUTH DAILY AT BEDTIME   omeprazole (PriLOSEC) 20 mg delayed release capsule  Self No No   Sig: Take 1 capsule (20 mg total) by mouth daily in the early morning for 30 days   oxybutynin (DITROPAN-XL) 10 MG 24 hr tablet   No No   Sig: TAKE 1 TABLET BY MOUTH EVERY DAY   rOPINIRole (REQUIP) 4 mg tablet   No No   Sig: TAKE 1 TABLET BY MOUTH EVERY 12 HOURS FOR 60 DAYS TAKE IN THE MORNING AND AT BEDTIME   sertraline (ZOLOFT) 100 mg tablet   No No   Sig: Take 2 tablets by mouth daily for 30 days      Facility-Administered Medications: None       Past Medical History:   Diagnosis Date    Anorexia nervosa in remission     Anxiety     Back pain     [facility] disorder [facility] )     Depression     Hypertension     Hypothyroid     Idiopathic intracranial hypertension     Obesity     Obsessive-compulsive disorder     Overactive bladder     Psychiatric disorder     Restless leg syndrome, controlled     Seasonal allergies     Seizure ([facility] )     Suicide and self-inflicted injury ([facility] )        Past Surgical History:   Procedure Laterality Date    DENTAL SURGERY      SINUS ENDOSCOPY      TONSILLECTOMY         Family History   Problem Relation Age of Onset    Depression Mother     Suicide Attempts Mother     Hypertension Mother     Diabetes Mother     Other Mother         bicuspid aortic valve    Hypertension Father     Hypothyroidism Father     Mental illness Father     Hypertension Brother     Cancer Maternal Grandmother     Heart disease Maternal Grandmother     Stroke Maternal Grandmother     Pneumonia Maternal Grandfather     Cancer Paternal Grandmother     Pneumonia Paternal Grandfather     Arthritis Family     Breast cancer Family     Osteopenia Family     Osteoporosis Family     Hypertension Family     Transient ischemic attack Family      I have reviewed and agree with the history as documented  Social History   Substance Use Topics    Smoking status: Never Smoker    Smokeless tobacco: Never Used    Alcohol use No        Review of Systems   Constitutional: Positive for fatigue  Negative for chills and fever  HENT: Negative for rhinorrhea and sore throat  Eyes: Negative for photophobia and visual disturbance  Respiratory: Negative for cough and shortness of breath  Cardiovascular: Negative for chest pain and leg swelling  Gastrointestinal: Negative for abdominal pain, diarrhea, nausea and vomiting  Genitourinary: Negative for dysuria, frequency and hematuria  Musculoskeletal: Negative for back pain and neck pain  Skin: Negative for rash and wound  Neurological: Positive for light-headedness and numbness   Negative for dizziness, seizures, syncope, facial asymmetry, speech difficulty, weakness and headaches  Psychiatric/Behavioral: Positive for confusion  Negative for dysphoric mood, hallucinations and suicidal ideas  Physical Exam  ED Triage Vitals [07/07/18 1614]   Temperature Pulse Respirations Blood Pressure SpO2   97 9 °F (36 6 °C) 78 18 151/70 100 %      Temp Source Heart Rate Source Patient Position - Orthostatic VS BP Location FiO2 (%)   Tympanic Monitor Sitting Left arm --      Pain Score       No Pain           Orthostatic Vital Signs  Vitals:    07/07/18 1614 07/07/18 1756   BP: 151/70 121/56   Pulse: 78 63   Patient Position - Orthostatic VS: Sitting Lying       Physical Exam   Constitutional: She is oriented to person, place, and time  She appears well-developed and well-nourished  No distress  HENT:   Head: Normocephalic and atraumatic  Dry mucous membranes   Eyes: Conjunctivae and EOM are normal  Pupils are equal, round, and reactive to light  No scleral icterus  Neck: Normal range of motion  Neck supple  No midline tenderness, no signs of meningismus   Cardiovascular: Normal rate, regular rhythm and normal heart sounds  Exam reveals no gallop and no friction rub  No murmur heard  Pulmonary/Chest: Effort normal and breath sounds normal  No respiratory distress  She has no wheezes  She has no rales  Abdominal: Soft  She exhibits no distension  There is no tenderness  There is no rebound and no guarding  Musculoskeletal: She exhibits no edema or tenderness  Neurological: She is alert and oriented to person, place, and time  No cranial nerve deficit  No gross motor or sensory deficits, no abnormalities with cerebellar testing, ambulating without difficulty, visual fields intact   Skin: Skin is warm and dry  She is not diaphoretic  Psychiatric: She has a normal mood and affect  Her behavior is normal  Thought content normal    Vitals reviewed        ED Medications  Medications   sodium chloride 0 9 % bolus 1,000 mL (0 mL Intravenous Stopped 7/7/18 1850)       Diagnostic Studies  Results Reviewed     Procedure Component Value Units Date/Time    POCT urinalysis dipstick [[id number]]  (Normal) Resulted:  07/07/18 1959    Lab Status:  Final result Specimen:  Urine Updated:  07/07/18 1959     Color, UA -    POCT pregnancy, urine [[id number]]  (Normal) Resulted:  07/07/18 1959    Lab Status:  Final result Updated:  07/07/18 1959     EXT PREG TEST UR (Ref: Negative) poct negative    ED Urine Macroscopic [[id number]] Collected:  07/07/18 2008    Lab Status:  Final result Specimen:  Urine Updated:  07/07/18 1959     Color, UA Yellow     Clarity, UA Clear     pH, UA 6 5     Leukocytes, UA Negative     Nitrite, UA Negative     Protein, UA Negative mg/dl      Glucose, UA Negative mg/dl      Ketones, UA Negative mg/dl      Urobilinogen, UA 0 2 E U /dl      Bilirubin, UA Negative     Blood, UA Negative     Specific Gravity, UA 1 025    Narrative:       CLINITEK RESULT    Comprehensive metabolic panel [[id number]]  (Abnormal) Collected:  07/07/18 1801    Lab Status:  Final result Specimen:  Blood from Arm, Left Updated:  07/07/18 1908     Sodium 136 mmol/L      Potassium 4 1 mmol/L      Chloride 105 mmol/L      CO2 28 mmol/L      Anion Gap 3 (L) mmol/L      BUN 12 mg/dL      Creatinine 0 69 mg/dL      Glucose 84 mg/dL      Calcium 8 6 mg/dL      AST 17 U/L      ALT 23 U/L      Alkaline Phosphatase 90 U/L      Total Protein 7 1 g/dL      Albumin 3 4 (L) g/dL      Total Bilirubin 0 21 mg/dL      eGFR 115 ml/min/1 73sq m     Narrative:         National Kidney Disease Education Program recommendations are as follows:  GFR calculation is accurate only with a steady state creatinine  Chronic Kidney disease less than 60 ml/min/1 73 sq  meters  Kidney failure less than 15 ml/min/1 73 sq  meters      TSH [[id number]]  (Normal) Collected:  07/07/18 1801    Lab Status:  Final result Specimen:  Blood from Arm, Left Updated:  07/07/18 1908     TSH 3RD GENERATON 1 510 uIU/mL     Narrative:         Patients undergoing fluorescein dye angiography may retain small amounts of fluorescein in the body for 48-72 hours post procedure  Samples containing fluorescein can produce falsely depressed TSH values  If the patient had this procedure,a specimen should be resubmitted post fluorescein clearance  The recommended reference ranges for TSH during pregnancy are as follows:  First trimester 0 1 to 2 5 uIU/mL  Second trimester  0 2 to 3 0 uIU/mL  Third trimester 0 3 to 3 0 uIU/m      CK Total with Reflex CKMB [[id number]]  (Normal) Collected:  07/07/18 1801    Lab Status:  Final result Specimen:  Blood from Arm, Left Updated:  07/07/18 1908     Total  U/L     CBC and differential [[id number]]  (Abnormal) Collected:  07/07/18 1801    Lab Status:  Final result Specimen:  Blood from Arm, Left Updated:  07/07/18 1832     WBC 10 35 (H) Thousand/uL      RBC 4 57 Million/uL      Hemoglobin 12 4 g/dL      Hematocrit 39 5 %      MCV 86 fL      MCH 27 1 pg      MCHC 31 4 g/dL      RDW 13 5 %      MPV 9 7 fL      Platelets 327 Thousands/uL      nRBC 0 /100 WBCs      Neutrophils Relative 60 %      Immat GRANS % 0 %      Lymphocytes Relative 29 %      Monocytes Relative 7 %      Eosinophils Relative 4 %      Basophils Relative 0 %      Neutrophils Absolute 6 19 Thousands/µL      Immature Grans Absolute 0 03 Thousand/uL      Lymphocytes Absolute 3 03 Thousands/µL      Monocytes Absolute 0 69 Thousand/µL      Eosinophils Absolute 0 37 Thousand/µL      Basophils Absolute 0 04 Thousands/µL                  No orders to display         Procedures  Procedures      Phone Consults  ED Phone Contact    ED Course                               MDM  Number of Diagnoses or Management Options  Dehydration:   Diagnosis management comments: Workup including CBC, CMP, TSH, CK, and urine dip/pregnancy unremarkable   Patient's symptoms likely secondary to dehydration and possibly secondary to recent psychiatric medication changes  She was given an IV fluid bolus and discharged with outpatient follow-up and return precautions for worsening symptoms  CritCare Time    Disposition  Final diagnoses:   Dehydration     Time reflects when diagnosis was documented in both MDM as applicable and the Disposition within this note     Time User Action Codes Description Comment    7/7/2018  8:08 PM Yesica Pacheco Rachael Add [E86 0] Dehydration       ED Disposition     ED Disposition Condition Comment    Discharge  Radha Motley discharge to home/self care  Condition at discharge: Good        Follow-up Information     Follow up With Specialties Details Why Contact Info Additional [address] , DO Internal Medicine Schedule an appointment as soon as possible for a visit  [address] [facility] [address]        [facility] [address] Emergency Department Emergency Medicine  As needed [address]  [facility], [address] [place] [address], [id number]          Patient's Medications   Discharge Prescriptions    No medications on file     No discharge procedures on file  ED Provider  Attending physically available and evaluated Radha Motley  I managed the patient along with the ED Attending      Electronically Signed by         Nella Reza MD  07/07/18 2009

## 2018-07-07 NOTE — ED ATTENDING ATTESTATION
Igor Gutiérrez DO, saw and evaluated the patient  I have discussed the patient with the resident/non-physician practitioner and agree with the resident's/non-physician practitioner's findings, Plan of Care, and MDM as documented in the resident's/non-physician practitioner's note, except where noted  All available labs and Radiology studies were reviewed  At this point I agree with the current assessment done in the Emergency Department  I have conducted an independent evaluation of this patient a history and physical is as follows:      35 yo female presents for evaluation of bilateral facial "weird feeling", feeling "spacey" starting when she awoke this AM  No a/e factors  Generalized in nature  No other c/o at this time  States she works as a  at [employer] and has been out in extreme heat all day, probably dehydrated  Normal neuro exam     Imp: "feeling weird", possibly due to heat exposure, mild dehydration  Plan: IVF, labs, reassess        Critical Care Time  CritCare Time    Procedures

## 2018-07-08 NOTE — DISCHARGE INSTRUCTIONS
Please make sure to stay hydrated  Follow up with your primary doctor and psychiatrist for further management  Return to the ER for new or worrisome symptoms  Dehydration   WHAT YOU NEED TO KNOW:   Dehydration is a condition that develops when your body does not have enough fluid  You may become dehydrated if you do not drink enough water or lose too much fluid  Fluid loss may also cause loss of electrolytes (minerals), such as sodium  DISCHARGE INSTRUCTIONS:   Return to the emergency department if:   · You have a seizure  · You are confused or cannot think clearly  · You are extremely sleepy, or another person cannot wake you  · You become dizzy or faint when you stand  · You are not able to urinate  · You have trouble breathing  · You have a fast or irregular heartbeat  · Your hands or feet are cold, or your face is pale  Contact your healthcare provider if:   · You have trouble drinking liquids because you are vomiting  · Your symptoms get worse  · You have a fever  · You feel very weak or tired  · You have questions or concerns about your condition or care  Follow up with your healthcare provider as directed:  Write down your questions so you remember to ask them during your visits  Prevent or manage dehydration:   · Drink liquids as directed  Liquids that contain water, sugar, and minerals can help your body hold in fluid and help prevent dehydration  Drink liquids throughout the day, not just when you feel thirsty  Men should drink about 3 liters (13 eight-ounce cups) of liquid each day  Women should drink about 2 liters (9 eight-ounce cups) of liquid each day  Drink even more liquid if you will be outdoors, in the sun for a long time, or exercising  · Stay cool  Limit the time you spend outdoors during the hottest part of the day  Dress in lightweight clothes  · Keep track of how often you urinate    If you urinate less than usual or your urine is darker, drink more liquids  © 2017 [facility] Information is for End User's use only and may not be sold, redistributed or otherwise used for commercial purposes  All illustrations and images included in CareNotes® are the copyrighted property of A D A M , Inc  or Ceasar Alcaraz  The above information is an  only  It is not intended as medical advice for individual conditions or treatments  Talk to your doctor, nurse or pharmacist before following any medical regimen to see if it is safe and effective for you

## 2018-07-09 RX ORDER — ROPINIROLE 4 MG/1
TABLET, FILM COATED ORAL
Qty: 60 TABLET | Refills: 0 | Status: SHIPPED | OUTPATIENT
Start: 2018-07-09 | End: 2018-08-04 | Stop reason: SDUPTHER

## 2018-07-10 ENCOUNTER — OFFICE VISIT (OUTPATIENT)
Dept: BEHAVIORAL/MENTAL HEALTH CLINIC | Facility: CLINIC | Age: 33
End: 2018-07-10
Payer: MEDICARE

## 2018-07-10 DIAGNOSIS — F31.4 SEVERE BIPOLAR I DISORDER, MOST RECENT EPISODE DEPRESSED (HCC): ICD-10-CM

## 2018-07-10 PROCEDURE — 90853 GROUP PSYCHOTHERAPY: CPT | Performed by: SOCIAL WORKER

## 2018-07-11 ENCOUNTER — TELEPHONE (OUTPATIENT)
Dept: UROLOGY | Facility: AMBULATORY SURGERY CENTER | Age: 33
End: 2018-07-11

## 2018-07-11 NOTE — TELEPHONE ENCOUNTER
Patient left early AM message with answering service cancelling her 9:30AM PTNS appt for this morning due to illness  She will call back to reschedule

## 2018-07-11 NOTE — TELEPHONE ENCOUNTER
Spoke with patient offered several times this week and next week  Ultimately patient scheduled for 7/18 at 21 [phone] with [facility] and nurse for PTNS

## 2018-07-12 ENCOUNTER — OFFICE VISIT (OUTPATIENT)
Dept: BEHAVIORAL/MENTAL HEALTH CLINIC | Facility: CLINIC | Age: 33
End: 2018-07-12
Payer: MEDICARE

## 2018-07-12 DIAGNOSIS — F50.81 BINGE-EATING DISORDER, SEVERE: ICD-10-CM

## 2018-07-12 DIAGNOSIS — F63.0 GAMBLING DISORDER, MODERATE: ICD-10-CM

## 2018-07-12 DIAGNOSIS — F42.2 MIXED OBSESSIONAL THOUGHTS AND ACTS: ICD-10-CM

## 2018-07-12 DIAGNOSIS — F31.4 SEVERE BIPOLAR I DISORDER, MOST RECENT EPISODE DEPRESSED (HCC): ICD-10-CM

## 2018-07-12 PROCEDURE — 90834 PSYTX W PT 45 MINUTES: CPT | Performed by: SOCIAL WORKER

## 2018-07-12 NOTE — PSYCH
Psychotherapy Provided: Individual Psychotherapy 50 minutes     Length of time in session: 50 minutes, follow up in 1 week    Goals addressed in session: Goal 1, Goal 2 and Goal 3      Pain:      none    0    Current suicide risk : Moderate    D: Met with Elizabeth for Individual Therapy session  Elizabeth  spoke with this worker today about the dehydration and malnourishment she has experienced since her last session  She verbalized how overactive her ED has been  A:  Elizabeth struggled much less again  today in utilizing her session for the purpose the intended and did not require limit setting by this worker today  P: Upcoming sessions will be used to continue to address the above  She will continue to attend both weekly Trauma and ED group therapy sessions  Limits for contact outside of the therapy sessions will continue to be enforced  Behavioral Health Treatment Plan [facility]: Diagnosis and Treatment Plan explained to Noel Weiss relates understanding diagnosis and is agreeable to Treatment Plan   Yes
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
## 2018-07-13 RX ORDER — LAMOTRIGINE 100 MG/1
200 TABLET ORAL DAILY
Refills: 1 | COMMUNITY
Start: 2018-06-19 | End: 2018-07-18 | Stop reason: SDUPTHER

## 2018-07-13 RX ORDER — BUPROPION HYDROCHLORIDE 150 MG/1
150 TABLET, EXTENDED RELEASE ORAL DAILY
Refills: 1 | COMMUNITY
Start: 2018-06-19 | End: 2018-08-02 | Stop reason: ALTCHOICE

## 2018-07-13 RX ORDER — LAMOTRIGINE 150 MG/1
300 TABLET ORAL DAILY
Refills: 0 | COMMUNITY
Start: 2018-07-04 | End: 2018-07-17 | Stop reason: SDUPTHER

## 2018-07-13 RX ORDER — MIRABEGRON 50 MG/1
1 TABLET, FILM COATED, EXTENDED RELEASE ORAL DAILY
Refills: 6 | COMMUNITY
Start: 2018-07-03 | End: 2018-07-18 | Stop reason: SDUPTHER

## 2018-07-15 PROBLEM — F43.10 PTSD (POST-TRAUMATIC STRESS DISORDER): Status: ACTIVE | Noted: 2017-10-12

## 2018-07-15 PROBLEM — N39.41 URGENCY INCONTINENCE: Status: ACTIVE | Noted: 2017-07-06

## 2018-07-15 PROBLEM — N39.44 NOCTURNAL ENURESIS: Status: ACTIVE | Noted: 2017-05-16

## 2018-07-15 PROBLEM — J30.9 ALLERGIC RHINITIS: Status: ACTIVE | Noted: 2018-01-24

## 2018-07-17 ENCOUNTER — OFFICE VISIT (OUTPATIENT)
Dept: FAMILY MEDICINE CLINIC | Facility: CLINIC | Age: 33
End: 2018-07-17
Payer: MEDICARE

## 2018-07-17 VITALS
BODY MASS INDEX: 45.99 KG/M2 | HEART RATE: 60 BPM | SYSTOLIC BLOOD PRESSURE: 118 MMHG | TEMPERATURE: 98 F | HEIGHT: 67 IN | RESPIRATION RATE: 16 BRPM | WEIGHT: 293 LBS | DIASTOLIC BLOOD PRESSURE: 80 MMHG

## 2018-07-17 DIAGNOSIS — E03.9 ACQUIRED HYPOTHYROIDISM: ICD-10-CM

## 2018-07-17 DIAGNOSIS — G89.29 CHRONIC MIDLINE LOW BACK PAIN WITHOUT SCIATICA: ICD-10-CM

## 2018-07-17 DIAGNOSIS — I10 ESSENTIAL HYPERTENSION: Primary | Chronic | ICD-10-CM

## 2018-07-17 DIAGNOSIS — M54.50 CHRONIC MIDLINE LOW BACK PAIN WITHOUT SCIATICA: ICD-10-CM

## 2018-07-17 DIAGNOSIS — F41.1 GAD (GENERALIZED ANXIETY DISORDER): ICD-10-CM

## 2018-07-17 DIAGNOSIS — Z13.6 SCREENING FOR CARDIOVASCULAR CONDITION: ICD-10-CM

## 2018-07-17 DIAGNOSIS — F50.81 BINGE-EATING DISORDER, SEVERE: ICD-10-CM

## 2018-07-17 DIAGNOSIS — M51.36 LUMBAR DEGENERATIVE DISC DISEASE: ICD-10-CM

## 2018-07-17 DIAGNOSIS — F31.4 SEVERE BIPOLAR I DISORDER, MOST RECENT EPISODE DEPRESSED (HCC): ICD-10-CM

## 2018-07-17 DIAGNOSIS — E66.01 MORBID OBESITY (HCC): ICD-10-CM

## 2018-07-17 DIAGNOSIS — K21.9 GASTROESOPHAGEAL REFLUX DISEASE WITHOUT ESOPHAGITIS: ICD-10-CM

## 2018-07-17 PROCEDURE — 99204 OFFICE O/P NEW MOD 45 MIN: CPT | Performed by: FAMILY MEDICINE

## 2018-07-17 NOTE — PROGRESS NOTES
7/18/2018      Chief Complaint   Patient presents with    Urinary Urgency     PTNS 12 of 24    Urinary Incontinence    Urinary Frequency       Assessment and Plan    34 y o  female managed by Dr Refugio Coker    1  Urinary frequency  - the patient has questions of stopping her medication, discussed that she should stop 1 at a time and if symptoms return she could resume  - will continue with her pelvic floor exercises and PTNS  - her next provider visit will be in 6 months      History of Present Illness  Ralf Briones is a 34 y o  female here for follow up evaluation of refractory urinary urgency and urge incontinence  Is undergoing monthly maintenance PTNS sessions, currently undergoing 12/24/18  Also underwent pelvic floor physical therapy and is currently on Myrbetriq and oxybutynin  The patient states that pelvic floor physical therapy greatly improved her lower urinary tract symptoms  They are listed as below  Review of Systems   Constitutional: Negative for activity change, chills and fever  Gastrointestinal: Negative for abdominal distention and abdominal pain  Musculoskeletal: Negative for back pain and gait problem  Psychiatric/Behavioral: Negative for behavioral problems and confusion  Urinary Incontinence Screening      Most Recent Value   Urinary Incontinence   Urinary Incontinence? No   Incomplete emptying? No   Urinary frequency? No   Urinary urgency? No   Urinary hesitancy? No   Dysuria (painful difficult urination)? No   Nocturia (waking up to use the bathroom)? Yes [2x]   Straining (having to push to go)? No   Weak stream?  No   Intermittent stream?  No   Post void dribbling?   No          Past Medical History  Past Medical History:   Diagnosis Date    Anorexia nervosa in remission     Anxiety     Back pain     [facility] [address] [facility]     Depression     Esophageal reflux 8/15/2013    Hypertension     Hypothyroid     Idiopathic intracranial hypertension     Lumbar degenerative disc disease 5/8/2014    Obesity     Obsessive-compulsive disorder     Overactive bladder     Psychiatric disorder     Restless leg syndrome, controlled     Seasonal allergies     Seizure ([facility] [address] [facility]     Suicide and self-inflicted injury [facility])        Past Social History  Past Surgical History:   Procedure Laterality Date    DENTAL SURGERY      SINUS ENDOSCOPY      TONSILLECTOMY       History   Smoking Status    Never Smoker   Smokeless Tobacco    Never Used       Past Family History  Family History   Problem Relation Age of Onset    Depression Mother     Suicide Attempts Mother     Hypertension Mother     Diabetes Mother     Other Mother         bicuspid aortic valve    Hypertension Father     Hypothyroidism Father     Mental illness Father     Hypertension Brother     Cancer Maternal Grandmother     Heart disease Maternal Grandmother     Stroke Maternal Grandmother     Pneumonia Maternal Grandfather     Cancer Paternal [de-identified]     Pneumonia Paternal Grandfather     Arthritis Family     Breast cancer Family     Osteopenia Family     Osteoporosis Family     Hypertension Family     Transient ischemic attack Family        Past Social history  Social History     Social History    Marital status: Single     Spouse name: N/A    Number of children: N/A    Years of education: N/A     Occupational History    Not on file       Social History Main Topics    Smoking status: Never Smoker    Smokeless tobacco: Never Used    Alcohol use No    Drug use: No    Sexual activity: Not Currently     Partners: Female, Male     Other Topics Concern    Not on file     Social History Narrative    Always uses seat belts    Caffeine use     Disabled - permament disability since 2008 due to psychiatric illness    Has no children    Single     Tea           Current Medications  Current Outpatient Prescriptions   Medication Sig Dispense Refill    buPROPion (WELLBUTRIN SR) 150 mg 12 hr tablet Take 150 mg by mouth daily  1    Cholecalciferol (VITAMIN D3) 5000 units CAPS Take 5,000 Units by mouth daily      clomiPRAMINE (ANAFRANIL) 50 mg capsule Take 200 mg by mouth 2 (two) times a day        gabapentin (NEURONTIN) 600 MG tablet Take two tabs qhs 60 tablet 2    HYDROcodone-acetaminophen (NORCO) 5-325 mg per tablet Take 1 tablet by mouth 2 (two) times a day as needed for pain (severe pain)      lamoTRIgine (LaMICtal) 150 MG tablet Take 2 tablets by mouth daily at bedtime for 30 days 60 tablet 0    levothyroxine 100 mcg tablet Take 1 tablet (100 mcg total) by mouth daily in the early morning for 30 days 30 tablet 5    lisinopril (ZESTRIL) 10 mg tablet Take 1 tablet (10 mg total) by mouth daily 30 tablet 5    LORazepam (ATIVAN) 1 mg tablet Take 1 mg by mouth 3 (three) times a day as needed for anxiety      lurasidone (LATUDA) 80 mg tablet Take 20 mg by mouth daily with breakfast      Mirabegron ER 50 MG TB24 Take 1 tablet by mouth daily for 30 days 30 tablet 0    montelukast (SINGULAIR) 10 mg tablet TAKE 1 TABLET (10 MG TOTAL) BY MOUTH DAILY AT BEDTIME 30 tablet 2    Multiple Vitamin (MULTIVITAMIN) capsule Take 1 capsule by mouth daily      omeprazole (PriLOSEC) 20 mg delayed release capsule Take 1 capsule (20 mg total) by mouth daily in the early morning for 30 days 90 capsule 1    oxybutynin (DITROPAN-XL) 10 MG 24 hr tablet TAKE 1 TABLET BY MOUTH EVERY DAY 30 tablet 0    rOPINIRole (REQUIP) 4 mg tablet TAKE 1 TABLET BY MOUTH EVERY 12 HOURS FOR 60 DAYS TAKE IN THE MORNING AND AT BEDTIME 60 tablet 0    sertraline (ZOLOFT) 100 mg tablet Take 2 tablets by mouth daily for 30 days 60 tablet 0    lithium carbonate (LITHOBID) 450 mg CR tablet Take 900 mg by mouth daily at bedtime Take 1 5 tablets        No current facility-administered medications for this visit          Allergies  Allergies   Allergen Reactions    Other      Seasonal allergies          The following portions of the patient's history were reviewed and updated as appropriate: allergies, current medications, past medical history, past social history, past surgical history and problem list       Vitals  Vitals:    07/18/18 1022   BP: 118/80   Pulse: 88   Height: 5' 7" (1 702 m)       Physical Exam  Constitutional   General appearance: Patient is seated and in no acute distress, well appearing and well nourished  Head and Face   Head and face: Normal     Eyes   Conjunctiva and lids: No erythema, swelling or discharge  Ears, Nose, Mouth, and Throat   Hearing: Normal     Pulmonary   Respiratory effort: No increased work of breathing or signs of respiratory distress  Cardiovascular   Examination of extremities for edema and/or varicosities: Normal     Abdomen   Abdomen: Non-tender, no masses  Skin   Skin and subcutaneous tissue: Warm, dry, and intact  No visible lesions or rashes  Psychiatric   Judgment and insight: Normal  Recent and remote memory:  Normal  Mood and affect: Normal      Results  No results found for this or any previous visit (from the past 1 hour(s))  ]  No results found for: PSA  Lab Results   Component Value Date    GLUCOSE 84 07/07/2018    CALCIUM 8 6 07/07/2018     07/07/2018    K 4 1 07/07/2018    CO2 28 07/07/2018     07/07/2018    BUN 12 07/07/2018    CREATININE 0 69 07/07/2018     Lab Results   Component Value Date    WBC 10 35 (H) 07/07/2018    HGB 12 4 07/07/2018    HCT 39 5 07/07/2018    MCV 86 07/07/2018     07/07/2018       Orders  No orders of the defined types were placed in this encounter

## 2018-07-17 NOTE — PROGRESS NOTES
Chief Complaint   Patient presents with    New Patient     New patient here to establish care  Complains of lower back pain left side and hip pain lef side  Assessment/Plan:    Hypertension  Blood pressure is stable  Continue Lisinopril 10 mg daily  Follow alow-sodium diet, regular exercise  Hypothyroidism  Continue replacement  with Levothyroxine 100 mcg daily  Chronic midline low back pain without sciatica  Patient has chronic low back pain secondary to degenerative disc disease of the lumbar spine  Referred to physiatry Dr Zack Valerio  for evaluation, discussed physical therapy  Mor[facility] ([facility]  Encouraged regular exercise, weight reduction  Consider referral to weight management  Esophageal reflux  Symptoms controlled on Omeprazole  20 mg 1 capsule daily  Avoid caffeine, fried, fatty foods  Severe bipolar I disorder, most recent episode depressed ([facility]  Management per psychiatry  Schedule follow-up office visit in his 4 months  Check labs prior to next visit  Diagnoses and all orders for this visit:    Essential hypertension  -     Comprehensive metabolic panel; Future    Acquired hypothyroidism  -     TSH, 3rd generation with T4 reflex; Future    Lumbar degenerative disc disease  -     Ambulatory referral to Physical Medicine Rehab; Future    Chronic midline low back pain without sciatica  -     Ambulatory referral to Physical Medicine Rehab; Future    Morbid obesity (HCC)    Gastroesophageal reflux disease without esophagitis    EILEEN (generalized anxiety disorder)    Binge-eating disorder, severe    Severe bipolar I disorder, most recent episode depressed ([facility]    Screening for cardiovascular condition  -     Lipid panel; Future          Subjective:      Patient ID: Avani Llanos is a 34 y o  female  HPI     New patient presents to establish medical care        She has multiple medical problems, including HTN, Hypothyroidism, chronic low back pain secondary to DDD of the lumbar spine, Morbid obesity, EILEEN, OCD,  Bipolar disorder, GERD, overactive bladder  Reviewed current medications with patient  She had blood work done on July 7, 2018  TSH 1 510, fasting blood sugar 84, creatinine 0 69, potassium 4 1  Patient has been followed by psychiatrist for binge eating disorder, bipolar disorder, OCD  She is also seeing a psychotherapist     HTN - currently taking Lisinopril 10 mg daily  Patient denies chest pain, SOB, dizziness  Hypothyroidism -patient takes Levothyroxine 100 mcg daily  Chronic back pain - patient states that she was seen previously by physiatrist who prescribed Norco 5/325 mg one twice daily PRN for pain  She had physical therapy with some improvement in symptoms  Denies tobacco, alcohol, drug use  The following portions of the patient's history were reviewed and updated as appropriate: allergies, past family history, past medical history, past social history, past surgical history and problem list     Review of Systems   Constitutional: Positive for fatigue  Negative for activity change, appetite change, chills and fever  HENT: Negative for congestion, ear pain, nosebleeds, sore throat, tinnitus and trouble swallowing  Eyes: Negative for pain, discharge, redness, itching and visual disturbance  Respiratory: Negative for cough, chest tightness, shortness of breath and wheezing  Cardiovascular: Negative for chest pain, palpitations and leg swelling  Gastrointestinal: Negative for abdominal distention, abdominal pain, blood in stool, diarrhea, nausea, rectal pain and vomiting  Genitourinary: Negative for difficulty urinating, dysuria, frequency and hematuria  Musculoskeletal: Positive for back pain (chronic low back pain )  Negative for gait problem, joint swelling and myalgias  Skin: Negative for rash and wound  Neurological: Negative for dizziness, syncope, numbness and headaches  Hematological: Negative  Psychiatric/Behavioral: Positive for sleep disturbance  Negative for suicidal ideas  Anxiety/ Depression, OCD         Objective:      /80 (BP Location: Left arm, Patient Position: Sitting, Cuff Size: Standard)   Pulse 60   Temp 98 °F (36 7 °C) (Tympanic)   Resp 16   Ht 5' 7" (1 702 m)   Wt (!) 170 kg (373 lb 12 8 oz)   LMP 06/18/2018   BMI 58 55 kg/m²          Physical Exam   Constitutional: She appears well-developed and well-nourished  Morbidly obese   HENT:   Head: Normocephalic and atraumatic  Right Ear: External ear normal    Left Ear: External ear normal    Eyes: Conjunctivae are normal  Pupils are equal, round, and reactive to light  Neck: Normal range of motion  Neck supple  Cardiovascular: Normal rate, regular rhythm and normal heart sounds  No murmur heard  No BL LE edema   Pulmonary/Chest: Effort normal and breath sounds normal  She has no wheezes  She has no rales  Abdominal: Soft  Bowel sounds are normal  There is no tenderness  Musculoskeletal:   Low back: tenderness in L-S area  Decreased ROM with flexion, extension  Skin: Skin is warm and dry  No rash noted  Psychiatric: She has a normal mood and affect  Nursing note and vitals reviewed

## 2018-07-18 ENCOUNTER — PROCEDURE VISIT (OUTPATIENT)
Dept: UROLOGY | Facility: AMBULATORY SURGERY CENTER | Age: 33
End: 2018-07-18
Payer: MEDICARE

## 2018-07-18 VITALS — HEART RATE: 88 BPM | HEIGHT: 67 IN | DIASTOLIC BLOOD PRESSURE: 80 MMHG | SYSTOLIC BLOOD PRESSURE: 118 MMHG

## 2018-07-18 DIAGNOSIS — N39.44 NOCTURNAL ENURESIS: ICD-10-CM

## 2018-07-18 DIAGNOSIS — N39.41 URGENCY INCONTINENCE: ICD-10-CM

## 2018-07-18 DIAGNOSIS — N32.81 OVERACTIVE BLADDER: Primary | ICD-10-CM

## 2018-07-18 PROCEDURE — 99213 OFFICE O/P EST LOW 20 MIN: CPT | Performed by: PHYSICIAN ASSISTANT

## 2018-07-18 NOTE — ASSESSMENT & PLAN NOTE
Blood pressure is stable  Continue Lisinopril 10 mg daily  Follow alow-sodium diet, regular exercise

## 2018-07-18 NOTE — PROGRESS NOTES
2018    Kemal Dinh  1985  [id number]    Diagnosis  Chief Complaint     Urinary Urgency; Urinary Incontinence; Urinary Frequency         Patient presents for PTNS  managed by Dr Jacqui Early  Per advanced practitioner note    Urinary Incontinence Screening      Most Recent Value   Urinary Incontinence   Urinary Incontinence? No   Incomplete emptying? No   Urinary frequency? No   Urinary urgency? No   Urinary hesitancy? No   Dysuria (painful difficult urination)? No   Nocturia (waking up to use the bathroom)? Yes [2x]   Straining (having to push to go)? No   Weak stream?  No   Intermittent stream?  No   Post void dribbling? No            Procedure PTNS  Session  24    Ankle used: right  Treatment settin  Duration of treatment: 30 minutes  Lead lot #:[id number]  Expiration Date:10/28/2020  Feeling/Response:at site, attempted second needle still just feeling at the site      Patient Goals and Progress  Decreased Urgency Yes  Decreased Frequency Yes  Episodes of incontinence No  Sleep Through Night Yes  Related Health and Social Factors Yes    Bladder Diary Summary:  Caffeine cups per day:1-2 servings per day  Alcohol- number of drinks per day:none  Daytime voids:8  Nighttime voids:1  Urgency:rare  Incontinence- episodes per day:none      Lucia Garcia RN  CURN, BSN

## 2018-07-18 NOTE — ASSESSMENT & PLAN NOTE
Patient has chronic low back pain secondary to degenerative disc disease of the lumbar spine  Referred to physiatry Dr Lino Mai  for evaluation, discussed physical therapy

## 2018-07-19 ENCOUNTER — OFFICE VISIT (OUTPATIENT)
Dept: BEHAVIORAL/MENTAL HEALTH CLINIC | Facility: CLINIC | Age: 33
End: 2018-07-19
Payer: MEDICARE

## 2018-07-19 DIAGNOSIS — F42.2 MIXED OBSESSIONAL THOUGHTS AND ACTS: ICD-10-CM

## 2018-07-19 DIAGNOSIS — F41.1 GAD (GENERALIZED ANXIETY DISORDER): ICD-10-CM

## 2018-07-19 DIAGNOSIS — F43.10 PTSD (POST-TRAUMATIC STRESS DISORDER): ICD-10-CM

## 2018-07-19 DIAGNOSIS — F50.81 BINGE-EATING DISORDER, SEVERE: ICD-10-CM

## 2018-07-19 PROCEDURE — 90834 PSYTX W PT 45 MINUTES: CPT | Performed by: SOCIAL WORKER

## 2018-07-20 NOTE — PSYCH
Psychotherapy Provided: Individual Psychotherapy 50 minutes     Length of time in session: 50 minutes, follow up in 1 week    Goals addressed in session: Goal 1, Goal 2 and Goal 3      Pain:      none    0    Current suicide risk : Moderate    D: Met with Elizabeth and her brother today  Elizabeth  Shared her feelings with him about their childhood, their parents, their current relationship, her ED, her OCD  A:  Elizabeth spoke openly and eloquently about all of the above with him  He listened attentively and was supportive of her  They have become much closer since she relocated to the area  P: Upcoming sessions will be used to continue to address the above  She will continue to attend both weekly Trauma and ED group therapy sessions  Limits for contact outside of the therapy sessions will continue to be enforced  Behavioral Health Treatment Plan [facility]: Diagnosis and Treatment Plan explained to Anjana Later relates understanding diagnosis and is agreeable to Treatment Plan   Yes

## 2018-07-24 ENCOUNTER — OFFICE VISIT (OUTPATIENT)
Dept: BEHAVIORAL/MENTAL HEALTH CLINIC | Facility: CLINIC | Age: 33
End: 2018-07-24
Payer: MEDICARE

## 2018-07-24 DIAGNOSIS — F43.10 PTSD (POST-TRAUMATIC STRESS DISORDER): ICD-10-CM

## 2018-07-24 PROCEDURE — 90853 GROUP PSYCHOTHERAPY: CPT | Performed by: SOCIAL WORKER

## 2018-07-26 ENCOUNTER — OFFICE VISIT (OUTPATIENT)
Dept: BEHAVIORAL/MENTAL HEALTH CLINIC | Facility: CLINIC | Age: 33
End: 2018-07-26
Payer: MEDICARE

## 2018-07-26 DIAGNOSIS — F41.1 GAD (GENERALIZED ANXIETY DISORDER): ICD-10-CM

## 2018-07-26 DIAGNOSIS — F43.10 PTSD (POST-TRAUMATIC STRESS DISORDER): ICD-10-CM

## 2018-07-26 DIAGNOSIS — F42.2 MIXED OBSESSIONAL THOUGHTS AND ACTS: ICD-10-CM

## 2018-07-26 DIAGNOSIS — F31.4 SEVERE BIPOLAR I DISORDER, MOST RECENT EPISODE DEPRESSED (HCC): ICD-10-CM

## 2018-07-26 DIAGNOSIS — F50.81 BINGE-EATING DISORDER, SEVERE: ICD-10-CM

## 2018-07-26 PROCEDURE — 90834 PSYTX W PT 45 MINUTES: CPT | Performed by: SOCIAL WORKER

## 2018-07-26 NOTE — PSYCH
Psychotherapy Provided: Individual Psychotherapy 50 minutes     Length of time in session: 50 minutes, follow up in 1 week    Goals addressed in session: Goal 1, Goal 2 and Goal 3      Pain:  Session 3 of 15    none    0    Current suicide risk : Moderate    D: Elizabeth  Spoke today about her desire to address her ED, OCD correlations  She read from her journal and discussed yesterday's meeting with her [address] spoke openly about all of the above while also accepting support, feedback and limit setting  P: Upcoming sessions will be used to continue to address the above  She will continue to attend both weekly Trauma and ED group therapy sessions  Limits for contact outside of the therapy sessions will continue to be enforced  Behavioral Health Treatment Plan [facility]: Diagnosis and Treatment Plan explained to Job Au relates understanding diagnosis and is agreeable to Treatment Plan   Yes

## 2018-07-31 ENCOUNTER — OFFICE VISIT (OUTPATIENT)
Dept: BEHAVIORAL/MENTAL HEALTH CLINIC | Facility: CLINIC | Age: 33
End: 2018-07-31
Payer: MEDICARE

## 2018-07-31 ENCOUNTER — TELEPHONE (OUTPATIENT)
Dept: GYNECOLOGY | Facility: CLINIC | Age: 33
End: 2018-07-31

## 2018-07-31 DIAGNOSIS — F50.81 BINGE-EATING DISORDER, SEVERE: ICD-10-CM

## 2018-07-31 DIAGNOSIS — N31.9 BLADDER DYSFUNCTION: ICD-10-CM

## 2018-07-31 PROCEDURE — 90853 GROUP PSYCHOTHERAPY: CPT | Performed by: SOCIAL WORKER
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
## 2018-08-01 NOTE — PSYCH
Psychotherapy Provided: Group Therapy      Length of time in session: 60 minutes, follow up in 1 week     Goals addressed in session: Goal 1      Pain:       none     0     Current suicide risk : Low       1 D:  The above was seen for Group Therapy session  Group members engaged in a discussion re: the earliest memories of their relationship with their ED  A:  Elizabeth  was actively involved in today's group session  She spoke at length about today's topic and was supportive with another member who was struggling  P : She will be encouraged to continue to attend individual therapy weekly and the ED group on a biweekly basis   [address]: Diagnosis and Treatment Plan explained to Haley Leahy relates understanding diagnosis and is agreeable to Treatment Plan   Yes

## 2018-08-02 ENCOUNTER — OFFICE VISIT (OUTPATIENT)
Dept: BEHAVIORAL/MENTAL HEALTH CLINIC | Facility: CLINIC | Age: 33
End: 2018-08-02
Payer: MEDICARE

## 2018-08-02 ENCOUNTER — OFFICE VISIT (OUTPATIENT)
Dept: GYNECOLOGY | Facility: CLINIC | Age: 33
End: 2018-08-02
Payer: MEDICARE

## 2018-08-02 VITALS
SYSTOLIC BLOOD PRESSURE: 132 MMHG | DIASTOLIC BLOOD PRESSURE: 80 MMHG | BODY MASS INDEX: 45.99 KG/M2 | HEIGHT: 67 IN | WEIGHT: 293 LBS | HEART RATE: 77 BPM

## 2018-08-02 DIAGNOSIS — N92.6 IRREGULAR BLEEDING: Primary | ICD-10-CM

## 2018-08-02 DIAGNOSIS — F50.81 BINGE-EATING DISORDER, SEVERE: ICD-10-CM

## 2018-08-02 DIAGNOSIS — F41.1 GAD (GENERALIZED ANXIETY DISORDER): ICD-10-CM

## 2018-08-02 DIAGNOSIS — F42.2 MIXED OBSESSIONAL THOUGHTS AND ACTS: ICD-10-CM

## 2018-08-02 DIAGNOSIS — F43.9 TRAUMA AND STRESSOR-RELATED DISORDER: ICD-10-CM

## 2018-08-02 DIAGNOSIS — F31.4 SEVERE BIPOLAR I DISORDER, MOST RECENT EPISODE DEPRESSED (HCC): ICD-10-CM

## 2018-08-02 PROCEDURE — 90834 PSYTX W PT 45 MINUTES: CPT | Performed by: SOCIAL WORKER

## 2018-08-02 PROCEDURE — 99213 OFFICE O/P EST LOW 20 MIN: CPT | Performed by: NURSE PRACTITIONER

## 2018-08-02 RX ORDER — MIRABEGRON 50 MG/1
TABLET, FILM COATED, EXTENDED RELEASE ORAL
Qty: 30 TABLET | Refills: 6 | Status: SHIPPED | OUTPATIENT
Start: 2018-08-02 | End: 2018-08-14 | Stop reason: ALTCHOICE

## 2018-08-02 RX ORDER — BUPROPION HYDROCHLORIDE 300 MG/1
300 TABLET ORAL DAILY
COMMUNITY
Start: 2018-07-31

## 2018-08-02 RX ORDER — SERTRALINE HYDROCHLORIDE 100 MG/1
200 TABLET, FILM COATED ORAL DAILY
Refills: 0 | COMMUNITY
Start: 2018-07-20

## 2018-08-03 NOTE — PSYCH
Psychotherapy Provided: Individual Psychotherapy 50 minutes     Length of time in session: 50 minutes, follow up in 1 week    Goals addressed in session: Goal 1, Goal 2 and Goal 3      Pain:  Session 5 of 15    none    0    Current suicide risk : Moderate    D: Elizabeth  Spoke today about her weight and how upset she was to have learned the number after weighing herself this week  She read from her journal and again verbalized how damaged her trust with this worker remains since her ability to be in contact in between sessions has been limited  A:  Elizabeth spoke openly about all of the above while continuing to accept support, feedback but struggles continuously with limit setting  P: Upcoming sessions will be used to continue to address the above  She will continue to attend both weekly Trauma and ED group therapy sessions  Limits for contact outside of the therapy sessions will continue to be enforced  Behavioral Health Treatment Plan [facility]: Diagnosis and Treatment Plan explained to Abiodun Mcbride relates understanding diagnosis and is agreeable to Treatment Plan   Yes

## 2018-08-04 DIAGNOSIS — G25.81 RESTLESS LEG SYNDROME: ICD-10-CM

## 2018-08-06 RX ORDER — ROPINIROLE 4 MG/1
TABLET, FILM COATED ORAL
Qty: 60 TABLET | Refills: 0 | Status: SHIPPED | OUTPATIENT
Start: 2018-08-06 | End: 2018-09-03 | Stop reason: SDUPTHER

## 2018-08-06 NOTE — PROGRESS NOTES
Assessment/Plan:      There are no diagnoses linked to this encounter  Subjective:     Patient ID: Thomas Boyce is a 34 y o  female  HPI new patient referral from primary care  Patient with lumbar degenerative disc disease and back pain  Previously treated in [facility], had aquatherapy and PTx with benefit  Last formal PTx 2014  Has relocated to here  Had developed LBP 2006 after significant  weight gain  Has episodic pain, associated with right leg pain, seen at pain management in [place], has not had spinal injections  Told by two surgeons not a surgical candidate  Currently some chronic right knee burning, variable LBP and left hip pain lateral, noted if sleeps left sidelying  [address]es to Dr Seng Mathur on [address]  Has had [employer]  Works part time, has disability as well  Review of Systems   Constitutional: Positive for unexpected weight change  Respiratory: Negative  Cardiovascular: Negative  Gastrointestinal: Negative  Genitourinary: Positive for difficulty urinating ( overactive bladder)  Musculoskeletal: Positive for arthralgias ( she does have problems with foot pain aggravated by exercise notable with 1st few steps in the morning), back pain and joint swelling (Left hip laterally)  Objective:  Data:  LEFT HIP  INDICATION:  Left hip pain  COMPARISON: Obstruction series 9/15/2008  VIEWS: AP pelvis (2) and 2 coned down views  FINDINGS:  There is no fracture or dislocation  No degenerative changes  No lytic or blastic lesions are seen  Soft tissues are unremarkable  IMPRESSION:     No acute osseous abnormality     Ref Range & Units 7/7/18  6:01 PM   Total CK 26 - 192 U/L 103       Ref Range & Units 7/7/18  6:01 PM   TSH 3RD GENERATON 0 358 - 3 740 uIU/mL 1 510       Ref Range & Units 5/17/18  2:51 PM   Sed Rate 0 - 20 mm/hour 17       Ref Range & Units 5/17/18  2:51 PM   Rheumatoid Factor Negative Negative       Ref Range & Units 5/17/18  2:51 PM   JANE Negative Negative Ref Range & Units 1/9/18  6:49 AM    Vit D, 25-Hydroxy 30 0 - 100 0 ng/mL 44 0    Narrative           Physical Exam   Constitutional: She appears well-developed and well-nourished  No distress  Eyes: Pupils are equal, round, and reactive to light  Musculoskeletal:        Lumbar back: She exhibits decreased range of motion, tenderness, bony tenderness, swelling, edema, deformity and spasm  Michele's maneuver positive for back pain    Some palpatory tenderness of the left proximal ITB lesser so the greater trochanteric bursa negative on the right side  Diffuse tenderness throughout lumbar paravertebral muscles particularly over the SI joints  There is loss of a hip extension with tight hip flexors  SLR negative bilat  Neurological: She has normal strength  She displays no tremor  She exhibits normal muscle tone  She displays no seizure activity  Reflex Scores:       Tricep reflexes are 2+ on the right side and 2+ on the left side  Bicep reflexes are 2+ on the right side and 2+ on the left side  Brachioradialis reflexes are 2+ on the right side and 2+ on the left side  Patellar reflexes are 1+ on the right side and 1+ on the left side  Achilles reflexes are 1+ on the left side  Motor power is intact for functional maneuvers in lower extremities  Psychiatric: Her behavior is normal  Judgment and thought content normal      Mildly tremulous voice

## 2018-08-07 ENCOUNTER — OFFICE VISIT (OUTPATIENT)
Dept: BEHAVIORAL/MENTAL HEALTH CLINIC | Facility: CLINIC | Age: 33
End: 2018-08-07
Payer: MEDICARE

## 2018-08-07 ENCOUNTER — OFFICE VISIT (OUTPATIENT)
Dept: PAIN MEDICINE | Facility: CLINIC | Age: 33
End: 2018-08-07
Payer: MEDICARE

## 2018-08-07 VITALS
HEIGHT: 67 IN | SYSTOLIC BLOOD PRESSURE: 122 MMHG | HEART RATE: 108 BPM | DIASTOLIC BLOOD PRESSURE: 88 MMHG | BODY MASS INDEX: 45.99 KG/M2 | WEIGHT: 293 LBS

## 2018-08-07 DIAGNOSIS — M54.50 CHRONIC MIDLINE LOW BACK PAIN WITHOUT SCIATICA: ICD-10-CM

## 2018-08-07 DIAGNOSIS — F43.10 PTSD (POST-TRAUMATIC STRESS DISORDER): ICD-10-CM

## 2018-08-07 DIAGNOSIS — M51.36 LUMBAR DEGENERATIVE DISC DISEASE: ICD-10-CM

## 2018-08-07 DIAGNOSIS — G89.29 CHRONIC MIDLINE LOW BACK PAIN WITHOUT SCIATICA: ICD-10-CM

## 2018-08-07 PROCEDURE — 90853 GROUP PSYCHOTHERAPY: CPT | Performed by: SOCIAL WORKER

## 2018-08-07 PROCEDURE — 99204 OFFICE O/P NEW MOD 45 MIN: CPT | Performed by: PHYSICAL MEDICINE & REHABILITATION

## 2018-08-07 NOTE — LETTER
August 7, 2018     Claudine Barrow, [address]    Patient: Conor Ogden   YOB: 1985   Date of Visit: 8/7/2018       Dear Dr David Horn:    Thank you for referring Fabiano Anastasiya to me for evaluation  Below are the relevant portions of my assessment and plan of care  There are no diagnoses linked to this encounter  If you have questions, please do not hesitate to call me  I look forward to following Kannan Avelar along with you           Sincerely,        Brennan Morales, DO        CC: No Recipients

## 2018-08-08 NOTE — PSYCH
Psychotherapy Provided: Group Therapy     Length of time in session: 60 minutes, follow up in 1 week    Goals addressed in session: Goal 1     Pain:  Session 6 of 15    none    0    Current suicide risk : Low     1 D:  The above was seen for Group Therapy session  Group members engaged in an activity and discussion re: anxiety and how it presents in our day to day exisistence  A: Elizabeth actively participated in today's discussion  She  shared at length about her own struggles this week, particularly re: her attempts to locate employment  P : She will be encouraged to continue to attend individual therapy weekly and the Trauma group on a biweekly basis  Behavioral Health Treatment Plan [facility]: Diagnosis and Treatment Plan explained to Juan Zelaya relates understanding diagnosis and is agreeable to Treatment Plan   Yes

## 2018-08-09 ENCOUNTER — TELEPHONE (OUTPATIENT)
Dept: PAIN MEDICINE | Facility: CLINIC | Age: 33
End: 2018-08-09

## 2018-08-09 ENCOUNTER — OFFICE VISIT (OUTPATIENT)
Dept: BEHAVIORAL/MENTAL HEALTH CLINIC | Facility: CLINIC | Age: 33
End: 2018-08-09
Payer: MEDICARE

## 2018-08-09 DIAGNOSIS — F31.4 SEVERE BIPOLAR I DISORDER, MOST RECENT EPISODE DEPRESSED (HCC): ICD-10-CM

## 2018-08-09 DIAGNOSIS — F50.81 BINGE-EATING DISORDER, SEVERE: ICD-10-CM

## 2018-08-09 DIAGNOSIS — F42.2 MIXED OBSESSIONAL THOUGHTS AND ACTS: ICD-10-CM

## 2018-08-09 DIAGNOSIS — F43.10 PTSD (POST-TRAUMATIC STRESS DISORDER): ICD-10-CM

## 2018-08-09 PROCEDURE — 90834 PSYTX W PT 45 MINUTES: CPT | Performed by: SOCIAL WORKER

## 2018-08-09 NOTE — PSYCH
Psychotherapy Provided: Individual Psychotherapy 50 minutes     Length of time in session: 50 minutes, follow up in 1 week    Goals addressed in session: Goal 1, Goal 2 and Goal 3      Pain:  Session 7 of 15    none    0    Current suicide risk : Moderate    D: Elizabeth  Spoke today about her feelings re: having again weighed herself and how releieved she was to have learned the number  She read from her journal and again verbalized how impacted she is when she feels as if someone may be upset with her  A:  Elizabeth spoke openly about all of the above while continuing to accept support  P: Upcoming sessions will be used to continue to address the above  She will continue to attend both weekly Trauma and ED group therapy sessions  Limits for contact outside of the therapy sessions will continue to be enforced  Behavioral Health Treatment Plan [facility]: Diagnosis and Treatment Plan explained to Elsa Steiner relates understanding diagnosis and is agreeable to Treatment Plan   Yes

## 2018-08-09 NOTE — TELEPHONE ENCOUNTER
Pt called and left  8/9 @ 12:04p asking if someone found a silver water bottle that she thinks she left in the office on 8/7   Pt would like a call either way if it was found or not at [phone]

## 2018-08-14 ENCOUNTER — OFFICE VISIT (OUTPATIENT)
Dept: BEHAVIORAL/MENTAL HEALTH CLINIC | Facility: CLINIC | Age: 33
End: 2018-08-14
Payer: MEDICARE

## 2018-08-14 ENCOUNTER — TELEPHONE (OUTPATIENT)
Dept: UROLOGY | Facility: AMBULATORY SURGERY CENTER | Age: 33
End: 2018-08-14

## 2018-08-14 ENCOUNTER — PROCEDURE VISIT (OUTPATIENT)
Dept: UROLOGY | Facility: AMBULATORY SURGERY CENTER | Age: 33
End: 2018-08-14
Payer: MEDICARE

## 2018-08-14 VITALS — DIASTOLIC BLOOD PRESSURE: 70 MMHG | HEART RATE: 80 BPM | SYSTOLIC BLOOD PRESSURE: 104 MMHG

## 2018-08-14 DIAGNOSIS — N32.81 OVERACTIVE BLADDER: Primary | ICD-10-CM

## 2018-08-14 DIAGNOSIS — N31.9 BLADDER DYSFUNCTION: ICD-10-CM

## 2018-08-14 DIAGNOSIS — R35.0 URINARY FREQUENCY: ICD-10-CM

## 2018-08-14 DIAGNOSIS — N39.41 URGE INCONTINENCE OF URINE: ICD-10-CM

## 2018-08-14 DIAGNOSIS — N39.44 NOCTURNAL ENURESIS: ICD-10-CM

## 2018-08-14 DIAGNOSIS — N39.41 URGENCY INCONTINENCE: ICD-10-CM

## 2018-08-14 DIAGNOSIS — F50.81 BINGE-EATING DISORDER, SEVERE: ICD-10-CM

## 2018-08-14 DIAGNOSIS — N32.81 OVERACTIVE BLADDER: ICD-10-CM

## 2018-08-14 PROCEDURE — 90853 GROUP PSYCHOTHERAPY: CPT | Performed by: SOCIAL WORKER

## 2018-08-14 PROCEDURE — 64566 NEUROELTRD STIM POST TIBIAL: CPT | Performed by: UROLOGY

## 2018-08-14 RX ORDER — CYCLOSPORINE 0.5 MG/ML
EMULSION OPHTHALMIC
Refills: 3 | COMMUNITY
Start: 2018-08-09

## 2018-08-14 NOTE — PROGRESS NOTES
2018    Sage Aparicio  1985  [id number]    Diagnosis  Chief Complaint     Urinary Urgency; Urinary Incontinence; Urinary Frequency         Patient presents for PTNS  24 managed by Dr Marysol Acosta  Patient will follow up in one month for next treatment  Urinary Incontinence Screening      Most Recent Value   Urinary Incontinence   Urinary Incontinence? No   Incomplete emptying? No   Urinary frequency? No   Urinary urgency? No   Urinary hesitancy? No   Dysuria (painful difficult urination)? No   Nocturia (waking up to use the bathroom)? Yes [2x]   Straining (having to push to go)? No   Weak stream?  No   Intermittent stream?  No   Post void dribbling? No        Vitals:    18 1456   BP: 104/70   Pulse: 80         Procedure PTNS  Session 15 of     Ankle used: right  Treatment settin  Duration of treatment: 30 minutes  Lead lot #:[id number]  Expiration Date:2021  Feeling/Response:top of the foot/ankle    Patient Goals and Progress  Decreased Urgency Yes  Decreased Frequency Yes  Episodes of incontinence Yes  Sleep Through Night No  Related Health and Social Factors Yes    Patient still pleased with overall progress with treatment and urinary symptoms  She would actually like to try lowering the dose of her myrbetriq now that she is doing so well with pelvic floor PT       Ananya Ramsey, FRANCA SmithN

## 2018-08-14 NOTE — TELEPHONE ENCOUNTER
Dr Jyoti Nunez patient seen in the [place] office  Patient is currently undergoing PTNS and was seen today for 13 of 24  She is also on myrbetriq 50 mg and recently completed pelvic floor PT  Pt questioned at visit today if she could try decreasing the dose of her myrbetriq? She reports she is doing really well s/p Pelvic floor PT and with ptns she would eventually like to stop medication if possible eventually

## 2018-08-14 NOTE — TELEPHONE ENCOUNTER
OK for patient to decrease Myrbetriq 50mg dosage to 25 mg based on improvement in urinary symptoms  Would recommend patient finish current prescription, and will send new prescription for Myrbetriq 25mg to patient's [facility]  Would not advise patient to break or cut current 50mg dosage  Will review urinary symptoms at next office visit with provider

## 2018-08-15 ENCOUNTER — HOSPITAL ENCOUNTER (OUTPATIENT)
Dept: RADIOLOGY | Facility: HOSPITAL | Age: 33
Discharge: HOME | End: 2018-08-15
Payer: MEDICARE

## 2018-08-15 DIAGNOSIS — N92.6 IRREGULAR BLEEDING: ICD-10-CM

## 2018-08-15 PROCEDURE — 76830 TRANSVAGINAL US NON-OB: CPT

## 2018-08-15 PROCEDURE — 76856 US EXAM PELVIC COMPLETE: CPT

## 2018-08-16 ENCOUNTER — OFFICE VISIT (OUTPATIENT)
Dept: BEHAVIORAL/MENTAL HEALTH CLINIC | Facility: CLINIC | Age: 33
End: 2018-08-16
Payer: MEDICARE

## 2018-08-16 DIAGNOSIS — F31.4 SEVERE BIPOLAR I DISORDER, MOST RECENT EPISODE DEPRESSED (HCC): ICD-10-CM

## 2018-08-16 DIAGNOSIS — F50.81 BINGE-EATING DISORDER, SEVERE: ICD-10-CM

## 2018-08-16 DIAGNOSIS — F42.2 MIXED OBSESSIONAL THOUGHTS AND ACTS: ICD-10-CM

## 2018-08-16 DIAGNOSIS — F43.10 PTSD (POST-TRAUMATIC STRESS DISORDER): ICD-10-CM

## 2018-08-16 PROCEDURE — 90834 PSYTX W PT 45 MINUTES: CPT | Performed by: SOCIAL WORKER

## 2018-08-16 RX ORDER — MIRABEGRON 50 MG/1
1 TABLET, FILM COATED, EXTENDED RELEASE ORAL DAILY
Refills: 6 | COMMUNITY
Start: 2018-08-02 | End: 2018-08-18 | Stop reason: DRUGHIGH

## 2018-08-16 NOTE — PSYCH
Pt left message at 548 pm  following today's therapy session stating that she is trying to like herself  went to [employer], weighed herself, and stated, if she weighs more than yesterday, she would have to kill herself "  She "is here" but she does not know what she is going to do    Contacted crisis  Informed of the above --they are going to contact police  Put out apb

## 2018-08-16 NOTE — PSYCH
Psychotherapy Provided: Group Therapy      Length of time in session: 60 minutes, follow up in 1 week     Goals addressed in session: Goal 1      Pain:  Session 8 of 15     none     0     Current suicide risk : Low       1 D:  The above was seen for Group Therapy session  Group members engaged in a discussion re: the Conflict Cycle  A:  Elizabeth  was preoccupied by upsetting thoughts re: her family during today's group session  She spoke about this to some degree while providing feedback, as well, to another member  P : She will be encouraged to continue to attend individual therapy weekly and the ED group on a biweekly basis   [facility]: Diagnosis and Treatment Plan explained to Duyen Dee relates understanding diagnosis and is agreeable to Treatment Plan   Yes

## 2018-08-17 ENCOUNTER — DOCUMENTATION (OUTPATIENT)
Dept: BEHAVIORAL/MENTAL HEALTH CLINIC | Facility: CLINIC | Age: 33
End: 2018-08-17

## 2018-08-17 NOTE — PROGRESS NOTES
Pt called requesting to speak with administration  In Supervisor's absence, this worker returned her call  Pt stated that she "wants rules changed so she is treated like all other normal clients "  Informed her she would be contacted next week for meeting to be arranged  She asked about attending group and was informed that I would let her know next week after Supervisor returned

## 2018-08-17 NOTE — PROGRESS NOTES
Per note from , Sangeetha So called to say she would not be attending any individual or group counseling she had set up with you      Per supervisor, discharge letter to be developed and mailed out today stating that her msg was received and that all appts will be cancelled at her request

## 2018-08-17 NOTE — PSYCH
Psychotherapy Provided: Individual Psychotherapy 50 minutes     Length of time in session: 50 minutes, follow up in 1 week    Goals addressed in session: Goal 1, Goal 2 and Goal 3      Pain:  Session 9 of 15    none    0    Current suicide risk : Moderate    D: Elizabeth  spoke today about her feelings re: having again weighed herself and how upset she was to have learned the number  She read from her journal and again verbalized how upset she is that she this worker will not "change the rules" and allow her to have contact in between sessions as this "is what she needs "     A:  Elizabeth resisted leaving when her session was over as she wished to continue the above discussion  She was gently informed, as she is every week, that this topic has been addressed and will not change  P: Upcoming sessions will be used to continue to address the above  She will continue to attend both weekly Trauma and ED group therapy sessions  Limits for contact outside of the therapy sessions will continue to be enforced  Behavioral Health Treatment Plan [facility]: Diagnosis and Treatment Plan explained to Rk Villanueva relates understanding diagnosis and is agreeable to Treatment Plan   Yes

## 2018-08-18 ENCOUNTER — TELEPHONE (OUTPATIENT)
Dept: FAMILY MEDICINE CLINIC | Facility: CLINIC | Age: 33
End: 2018-08-18

## 2018-08-18 ENCOUNTER — OFFICE VISIT (OUTPATIENT)
Dept: FAMILY MEDICINE CLINIC | Facility: CLINIC | Age: 33
End: 2018-08-18
Payer: MEDICARE

## 2018-08-18 VITALS
HEART RATE: 76 BPM | RESPIRATION RATE: 16 BRPM | WEIGHT: 293 LBS | TEMPERATURE: 98.1 F | DIASTOLIC BLOOD PRESSURE: 64 MMHG | HEIGHT: 67 IN | SYSTOLIC BLOOD PRESSURE: 122 MMHG | BODY MASS INDEX: 45.99 KG/M2

## 2018-08-18 DIAGNOSIS — M62.830 SPASM OF MUSCLE OF LOWER BACK: Primary | ICD-10-CM

## 2018-08-18 DIAGNOSIS — G89.29 CHRONIC MIDLINE LOW BACK PAIN WITHOUT SCIATICA: ICD-10-CM

## 2018-08-18 DIAGNOSIS — M54.50 CHRONIC MIDLINE LOW BACK PAIN WITHOUT SCIATICA: ICD-10-CM

## 2018-08-18 DIAGNOSIS — M51.36 LUMBAR DEGENERATIVE DISC DISEASE: ICD-10-CM

## 2018-08-18 PROCEDURE — 99213 OFFICE O/P EST LOW 20 MIN: CPT | Performed by: FAMILY MEDICINE

## 2018-08-18 RX ORDER — CYCLOBENZAPRINE HCL 10 MG
TABLET ORAL
Qty: 20 TABLET | Refills: 0 | Status: SHIPPED | OUTPATIENT
Start: 2018-08-18 | End: 2018-08-20 | Stop reason: ALTCHOICE

## 2018-08-18 RX ORDER — METAXALONE 800 MG/1
800 TABLET ORAL 3 TIMES DAILY
Qty: 30 TABLET | Refills: 0 | Status: SHIPPED | OUTPATIENT
Start: 2018-08-18 | End: 2018-08-20 | Stop reason: ALTCHOICE

## 2018-08-18 NOTE — PROGRESS NOTES
Assessment/Plan:    I reviewed her chart for her medical problems, past history, and medications  I discussed with her that this is an acute spasm and I agree with her that it is different than her other chronic lumbar spine issues  We discussed home medications work and that in this case pain medications really are not appropriately directed at the underlying issue and should not be the goal of therapy  I suggested and agreed with her that a muscle relaxer would be reasonable choice of medication but we need to be careful because of the multiple medications she is on  She had had Flexeril in the past but I am concerned that this is more sedating and might particular interact with her lorazepam if she needs to use it on a p r n  Basis    I have prescribed Skelaxin for her uses needed for the spasm and cautioned her that it also can have side effects and just to be careful  She also should use heat either in the form of hot shower, heating pad on lower medium with something between the heating pad in the skin, or over the counter salves that produce heat  She had been seen by Dr Brandi Burton for evaluation because of her chronic problems  He had referred her for evaluation at [facility] physical therapy and she has an appointment on Monday for this  She is very aware that her obesity plays a role but her underlying behavioral disorders including eating disorder makes this a challenge for her  She actually has an appointment next week for a general wellness visit and she can be reassessed at that time of her back is doing  Today is her [birth year]    Addendum:  Patient called back and states that her insurance will not cover the Skelaxin but will cover Flexeril  I will send a prescription for Flexeril to the pharmacy but reiterated to be very careful particularly in using this with her lorazepam   I also reiterated that she should not use the Vicodin      No problem-specific Assessment & Plan notes found for this encounter  Diagnoses and all orders for this visit:    Spasm of muscle of lower back  Comments:   see assessment and plan  Orders:  -     metaxalone (SKELAXIN) 800 mg tablet; Take 1 tablet (800 mg total) by mouth 3 (three) times a day    Lumbar degenerative disc disease    Chronic midline low back pain without sciatica    BMI 50 0-59 9, adult (HCC)          Subjective:      Patient ID: Sherryle Derby is a 28 y o  female      Damasoolga Jewell is here today because of complaint    She has had chronic back problems over the years but this pain is different from her chronic 1  She states that she was doing in online courses that for about 3 hours and when she got up after sitting there she developed spasms   The pain is across her lower back around her waistline/beltline  Is localized without radiation down her leg  It feels better when she is in a hot shower or when she gets up and moves around    She has used Advil and then last night took a Vicodin that she had for her other back pain but she prefers not to use this as she realizes it is only a "Band-Aid"    Her previous back problems were related to disc and was more to the left and had radicular qualities    She has had muscle relaxers in the past but then was unable to take these for a while when she was on lithium  When she did have these they were effective when she had muscle spasms        The following portions of the patient's history were reviewed and updated as appropriate: allergies, current medications, past family history, past medical history, past social history, past surgical history and problem list     Review of Systems   Constitutional:        See HPI   Musculoskeletal:        See HPI   Neurological:        See HPI   Psychiatric/Behavioral:        See HPI         Objective:      /64   Pulse 76   Temp 98 1 °F (36 7 °C) (Tympanic)   Resp 16   Ht 5' 7" (1 702 m)   Wt (!) 168 kg (371 lb)   LMP 08/06/2018   BMI 58 11 kg/m² Physical Exam   Constitutional: She is oriented to person, place, and time  Overweight    She appears mildly uncomfortable when getting up from sitting or changing positions   Musculoskeletal:   Examination of the lower back reveals muscle spasms in the lumbar area bilaterally    She has chronic tenderness in the lumbar spine area   Neurological: She is alert and oriented to person, place, and time  There is no evidence of acute radicular or spinal stenosis abnormalities   Nursing note and vitals reviewed

## 2018-08-18 NOTE — TELEPHONE ENCOUNTER
Patient called and let us know that the medication you gave her for her back spasms is not covered by her insurance in the past they have covered Flexeril  Is this an option?  Please advise

## 2018-08-20 ENCOUNTER — OFFICE VISIT (OUTPATIENT)
Dept: FAMILY MEDICINE CLINIC | Facility: CLINIC | Age: 33
End: 2018-08-20
Payer: MEDICARE

## 2018-08-20 ENCOUNTER — TELEPHONE (OUTPATIENT)
Dept: FAMILY MEDICINE CLINIC | Facility: CLINIC | Age: 33
End: 2018-08-20

## 2018-08-20 ENCOUNTER — APPOINTMENT (OUTPATIENT)
Dept: PHYSICAL THERAPY | Facility: REHABILITATION | Age: 33
End: 2018-08-20
Payer: MEDICARE

## 2018-08-20 VITALS
WEIGHT: 293 LBS | TEMPERATURE: 98.5 F | HEIGHT: 67 IN | SYSTOLIC BLOOD PRESSURE: 100 MMHG | BODY MASS INDEX: 45.99 KG/M2 | DIASTOLIC BLOOD PRESSURE: 60 MMHG | HEART RATE: 80 BPM | RESPIRATION RATE: 16 BRPM

## 2018-08-20 DIAGNOSIS — Z11.1 SCREENING FOR TUBERCULOSIS: ICD-10-CM

## 2018-08-20 DIAGNOSIS — Z00.00 WELL ADULT EXAM: Primary | ICD-10-CM

## 2018-08-20 DIAGNOSIS — M62.830 BACK MUSCLE SPASM: ICD-10-CM

## 2018-08-20 DIAGNOSIS — M62.830 SPASM OF MUSCLE OF LOWER BACK: ICD-10-CM

## 2018-08-20 PROCEDURE — G0438 PPPS, INITIAL VISIT: HCPCS | Performed by: NURSE PRACTITIONER

## 2018-08-20 PROCEDURE — 86580 TB INTRADERMAL TEST: CPT

## 2018-08-20 RX ORDER — METAXALONE 800 MG/1
800 TABLET ORAL 3 TIMES DAILY
Qty: 30 TABLET | Refills: 0 | Status: CANCELLED | OUTPATIENT
Start: 2018-08-20

## 2018-08-20 NOTE — PROGRESS NOTES
Chief Complaint   Patient presents with    Physical Exam     for hire in  Staff       Assessment/Plan:     Diagnoses and all orders for this visit:    Well adult exam    BMI 50 0-59 9, [facility]    Screening for tuberculosis  -     TB Skin Test    Back muscle spasm          Subjective:      Patient ID: Ileana Geller is a 28 y o  female  Assessment/Plan    Healthy female exam      1  Forms completed today for pre-employment  PPD administered today (return for nurse visit in 48-72 hours)  She will check with her insurance regarding coverage of the Tdap  UTD with PAP/ GYN exams (follows with [facility])    2  Back spasms- didn't tolerate the Flexeril but spasms are improving on their own  Continue to use moist heat, gentle stretches  3  Patient Counseling:  --Nutrition: Stressed importance of moderation in sodium/caffeine intake, saturated fat and cholesterol, caloric balance, sufficient intake of fresh fruits, vegetables, fiber, calcium, iron, and 1 mg of folate supplement per day (for females capable of pregnancy)  --Discussed the issue of estrogen replacement, calcium supplement, and the daily use of baby aspirin  --Exercise: Stressed the importance of regular exercise  --Substance Abuse: Discussed cessation/primary prevention of tobacco, alcohol, or other drug use; driving or other dangerous activities under the influence; availability of treatment for abuse  --Sexuality: Discussed sexually transmitted diseases, partner selection, use of condoms, avoidance of unintended pregnancy  and contraceptive alternatives  --Injury prevention: Discussed safety belts, safety helmets, smoke detector, smoking near bedding or upholstery  --Dental health: Discussed importance of regular tooth brushing, flossing, and dental visits  --Immunizations reviewed  --Discussed benefits of screening colonoscopy  --After hours service discussed with patient    4  Discussed the patient's BMI with her  The BMI is above average; BMI management plan is completed  Encouraged regular exercise, work on weight loss  She is currently following with a Nutritionist     5  Follow up in 6 months  Lisa Sabillon is a 28 y o  female and is here for a comprehensive physical exam for  Staff Health Assessment  The patient reports no problems  Pt was recently evaluated in our office over this past weekend by Dr Moise Sharif for muscle spasms of the back  She was prescribed Flexeril for prn use  She ended up taking this once but felt it was too sedating  Reports her back spasms have actually improved  Took OTC Motrin a few times and has been using heat as needed  No radiation down legs, no leg numbness or tingling  Pt with chronic lower back pain    Recently had labs done via her ENT specialist (back in July of this year)     Pt follows with Psychiatry weekly for OCD, Bipolar Disorder, Anxiety (on multiple medications)- reports her mood has been very stable, feels comfortable with current regimen      Do you take any herbs or supplements that were not prescribed by a doctor? no  Are you taking calcium supplements? no  Are you taking aspirin daily? no     History:  Any STD's in the past? none    The following portions of the patient's history were reviewed and updated as appropriate: allergies, current medications, past medical history, past social history and problem list     Review of Systems  Do you have pain that bothers you in your daily life? no  Constitutional: negative  Eyes: negative  Ears, nose, mouth, throat, and face: negative  Respiratory: negative  Cardiovascular: negative  Gastrointestinal: negative  Genitourinary:negative  Integument/breast: negative  Hematologic/lymphatic: negative  Musculoskeletal:negative, as noted in HPI  Neurological: negative  Behavioral/Psych: as noted in HPI  Endocrine: negative  Allergic/Immunologic: negative      Objective    General appearance: alert and oriented, in no acute distress  Head: Normocephalic, without obvious abnormality, atraumatic  Eyes: conjunctivae/corneas clear  PERRL, EOM's intact  Fundi benign, wearing eye glasses   Ears: normal TM's and external ear canals both ears  Nose: Nares normal  Septum midline  Mucosa normal  No drainage or sinus tenderness  Throat: lips, mucosa, and tongue normal; teeth and gums normal  Neck: no adenopathy, no carotid bruit, no JVD, supple, symmetrical, trachea midline and thyroid not enlarged, symmetric, no tenderness/mass/nodules  Back: symmetric, no curvature  ROM normal  No CVA tenderness  , chronic longer back pain, mildly tender to palpation  Lungs: clear to auscultation bilaterally  Heart: regular rate and rhythm, S1, S2 normal, no murmur, click, rub or gallop  Abdomen: soft, non-tender; bowel sounds normal; no masses,  no organomegaly  Extremities: extremities normal, warm and well-perfused; no cyanosis, clubbing, or edema  Skin: Skin color, texture, turgor normal  No rashes or lesions  Lymph nodes: Cervical, supraclavicular, and axillary nodes normal   Neurologic: Grossly normal           The following portions of the patient's history were reviewed and updated as appropriate: allergies, current medications, past medical history, past social history and problem list     Review of Systems      Objective: see above     /60   Pulse 80   Temp 98 5 °F (36 9 °C) (Tympanic)   Resp 16   Ht 5' 7" (1 702 m)   Wt (!) 168 kg (371 lb)   LMP 08/06/2018   BMI 58 11 kg/m²      Physical Exam  see above

## 2018-08-20 NOTE — TELEPHONE ENCOUNTER
Received fax from [address] [place] [address] 800 mg not covered alternatives are Tizanidine HCL 4 mg or Cyclobenzaprine 10 mg

## 2018-08-20 NOTE — PROGRESS NOTES
PT Evaluation     Today's date: 2018  Patient name: Kemal Dinh  : 1985  MRN: [id number]  Referring provider: Yessenia Love DO  Dx: No diagnosis found  Assessment  Impairments: abnormal coordination, abnormal muscle firing, abnormal muscle tone, abnormal or restricted ROM, abnormal movement, activity intolerance, difficulty understanding, impaired physical strength, lacks appropriate home exercise program and pain with function    Assessment details: Kemal Dinh is a pleasant 28 y o  female who presents with ***  The patient's greatest concerns are {Concerns:00982::"worry over not knowing what's wrong","fear of not being able to keep active"}  {referral:89031}  Primary movement impairment diagnosis of *** resulting in pathoanatomical symptoms of No diagnosis found  and limiting her ability to {skfunction:55339}  Impairments include:  1)   2)  3)  Etiologic factors include {causes; back pain:20129}  Understanding of Dx/Px/POC: good   Prognosis: good  Prognosis details: Positive prognostic indicators include {PRECAUTIONS:38678::"positive attitude toward recovery"}  Negative prognostic indicators include {PRECAUTIONS:58810::"chronicity of symptoms","anxiety","hypertension","high symptom irritability","obesity"}  Goals  Patient will be independent with home exercise program    Patient will be able to manage symptoms independently       Plan  Patient would benefit from: skilled PT  Referral necessary: No  Planned modality interventions: thermotherapy: hydrocollator packs  Planned therapy interventions: activity modification, joint mobilization, manual therapy, motor coordination training, neuromuscular re-education, patient education, self care, therapeutic activities, therapeutic exercise, graded activity, home exercise program and behavior modification  Treatment plan discussed with: patient  Plan details: Prognosis above is given PT services 2x/week tapering to 1x/week over the next 2 months and home program adherence          Subjective    Objective     General Comments     Lumbar Comments  ROM: extension-  ***%nunez, flexion-  ***%nunez, R SB-   ***%nunez, L SB-  ***%nunez, R rot-   ***%nunez, L rot-   ***%nunez  Repeated ROM: Flex-(***), Ext- (***)  Palpation: ***  Joint Mobility: ***  Dermatomes: ***   Myotomes: ***  Reflexes: L4-(***) , S1-(***)  Special Tests:   Lumbar-SLR-(***), Tibial Bias-(***), Common Fib Bias-(***), SLR-(***), Slump Test- (***), Valsalva- (***), Femoral Nerve Tension-(***), Prone Instability-(***), Zaragoza-(***)  SIJ-Jaleesa finger-(***), Seated PSIS-(***), Gillete-(***), Supine-Sit-(***), Prone knee bend-(***), Gaenslen's-(**           Precautions: ***    Daily Treatment Diary     Manual                                                                                   Exercise Diary                                                                                                                                                                                                                                                                                      Modalities

## 2018-08-21 ENCOUNTER — DOCUMENTATION (OUTPATIENT)
Dept: BEHAVIORAL/MENTAL HEALTH CLINIC | Facility: CLINIC | Age: 33
End: 2018-08-21

## 2018-08-21 NOTE — PROGRESS NOTES
Assessment/Plan:     Binge Eating D/O, OCD, PTSD, Severe Bipolar D/O, Gambling D/O in remission, EILEEN    Subjective:     Patient ID: Kemal Dinh is a 28 y o  female  Outpatient Discharge Summary:   Admission Date:   Aaron Mora was referred by   Discharge Date: 8/21/2018    Discharge Diagnosis:    See above      Treating Physician:   Treatment Complications: Aaron Mora struggled over past several months to comply with therapeutic boundaries and repeatedly questioned why she was not able to have contact with this worker outside of sessions  Presenting Problem: Aaron Mora began seeing this worker for Eating D/O issues and family issues approximately 8 years ago  Course of treatment includes:    group counseling, medication management, family counseling and individual therapy   Treatment Progress: fair- Aaron Mora struggled to verbalize thoughts and feelings for the first several years  She was hospitalized on several occasions for her Eating Disorder, depression, feelings of suicidality  Over the course of treatment, she became more vocal and able to communicate her emotions while continuing to struggle with low self esteem, self injury and overwhelming OCD  Criteria for Discharge: Aaron Mora contacted this worker on 8/17/18 and requested all sessions be cancelled following an incident in which she contacted this worker after the session stating that she was suicidal   Crisis was contacted and she refused to provide her location to authorities  Aftercare recommendations include Aaron Mora will be seen by a therapist in her Psychiatrist practice, [address] [facility]      Discharge Medications include:  Current Outpatient Prescriptions:     buPROPion (WELLBUTRIN XL) 300 mg 24 hr tablet, , Disp: , Rfl:     Cholecalciferol (VITAMIN D3) 5000 units CAPS, Take 5,000 Units by mouth daily, Disp: , Rfl:     gabapentin (NEURONTIN) 600 MG tablet, Take two tabs qhs, Disp: 60 tablet, Rfl: 2    HYDROcodone-acetaminophen (NORCO) 5-325 mg per tablet, Take 1 tablet by mouth 2 (two) times a day as needed for pain (severe pain), Disp: , Rfl:     lamoTRIgine (LaMICtal) 150 MG tablet, Take 2 tablets by mouth daily at bedtime for 30 days, Disp: 60 tablet, Rfl: 0    levothyroxine 100 mcg tablet, Take 1 tablet (100 mcg total) by mouth daily in the early morning for 30 days, Disp: 30 tablet, Rfl: 5    lisinopril (ZESTRIL) 10 mg tablet, Take 1 tablet (10 mg total) by mouth daily, Disp: 30 tablet, Rfl: 5    LORazepam (ATIVAN) 1 mg tablet, Take 1 mg by mouth 3 (three) times a day as needed for anxiety, Disp: , Rfl:     lurasidone (LATUDA) 80 mg tablet, Take 20 mg by mouth daily with breakfast, Disp: , Rfl:     Mirabegron ER 25 MG TB24, Take 25 mg by mouth daily, Disp: 30 tablet, Rfl: 6    montelukast (SINGULAIR) 10 mg tablet, TAKE 1 TABLET (10 MG TOTAL) BY MOUTH DAILY AT BEDTIME, Disp: 30 tablet, Rfl: 2    Multiple Vitamin (MULTIVITAMIN) capsule, Take 1 capsule by mouth daily, Disp: , Rfl:     omeprazole (PriLOSEC) 20 mg delayed release capsule, Take 1 capsule (20 mg total) by mouth daily in the early morning for 30 days, Disp: 90 capsule, Rfl: 1    RESTASIS 0 05 % ophthalmic emulsion, PLACE 1 DROP INTO BOTH EYES TWICE A DAY, Disp: , Rfl: 3    rOPINIRole (REQUIP) 4 mg tablet, TAKE 1 TABLET BY MOUTH EVERY 12 HOURS FOR 60 DAYS TAKE IN THE MORNING AND AT BEDTIME, Disp: 60 tablet, Rfl: 0    sertraline (ZOLOFT) 100 mg tablet, Take 200 mg by mouth daily, Disp: , Rfl: 0    Prognosis: fair

## 2018-08-22 ENCOUNTER — EVALUATION (OUTPATIENT)
Dept: PHYSICAL THERAPY | Facility: REHABILITATION | Age: 33
End: 2018-08-22
Payer: MEDICARE

## 2018-08-22 DIAGNOSIS — G89.29 CHRONIC MIDLINE LOW BACK PAIN WITHOUT SCIATICA: ICD-10-CM

## 2018-08-22 DIAGNOSIS — M51.36 LUMBAR DEGENERATIVE DISC DISEASE: Primary | ICD-10-CM

## 2018-08-22 DIAGNOSIS — M54.50 CHRONIC MIDLINE LOW BACK PAIN WITHOUT SCIATICA: ICD-10-CM

## 2018-08-22 PROCEDURE — 97110 THERAPEUTIC EXERCISES: CPT | Performed by: PHYSICAL THERAPIST

## 2018-08-22 PROCEDURE — G8990 OTHER PT/OT CURRENT STATUS: HCPCS | Performed by: PHYSICAL THERAPIST

## 2018-08-22 PROCEDURE — G8991 OTHER PT/OT GOAL STATUS: HCPCS | Performed by: PHYSICAL THERAPIST

## 2018-08-22 PROCEDURE — 97162 PT EVAL MOD COMPLEX 30 MIN: CPT | Performed by: PHYSICAL THERAPIST

## 2018-08-22 RX ORDER — CLOMIPRAMINE HYDROCHLORIDE 50 MG/1
CAPSULE ORAL
COMMUNITY

## 2018-08-22 NOTE — PROGRESS NOTES
PT Evaluation     Today's date: 2018  Patient name: Thomas Boyce  : 1985  MRN: [id number]  Referring provider: Shane Faith DO  Dx:   Encounter Diagnosis     ICD-10-CM    1  Lumbar degenerative disc disease M51 36 Ambulatory referral to Physical Therapy   2  Chronic midline low back pain without sciatica M54 5 Ambulatory referral to Physical Therapy    G89 29                   Assessment  Impairments: abnormal muscle firing, abnormal or restricted ROM, activity intolerance, impaired physical strength, lacks appropriate home exercise program, pain with function and poor posture     Assessment details: Patient presents with pain, decreased lumbar ROM, decreased hip/core strength, postural deficits and decreased function secondary to acute on chronic LBP  Patient would benefit from skilled PT intervention to address these issues and to maximize function  Thank you for the referral   Understanding of Dx/Px/POC: good   Prognosis: good    Goals  Short Term:  Pt will report decreased levels of pain by at least 2 subjective ratings in 4 weeks  Pt will demonstrate improved ROM by at least 25% in 4 weeks  Pt will demonstrate improved strength by 1/2 grade MMT in 4 weeks  Long Term:   Pt will be independent in their HEP in 8 weeks  Pt will demonstrate improved FOTO, > 56  Pt will be independent with all ADL's    Plan  Planned modality interventions: thermotherapy: hydrocollator packs  Planned therapy interventions: abdominal trunk stabilization, joint mobilization, manual therapy, neuromuscular re-education, patient education, postural training, flexibility, therapeutic activities, therapeutic exercise and home exercise program  Frequency: 2x week  Duration in weeks: 8  Plan of Care beginning date: 2018  Plan of Care expiration date: 10/17/2018  Treatment plan discussed with: patient  Plan details: Pt was educated on HEP          Subjective Evaluation    History of Present Illness  Mechanism of injury: Pt is a 28 y o female with a history of LBP for at least 7 years, intermittently  Pt notes having Aquatic PT in the past with relief  Pt saw Dr Avelina Knight recently as a preventative measure and pt was referred for PT  Pt does report having an acute flare-up this past Friday due to prolonged sitting to perform some computer work  Pt c/o spasms and difficulty standing erect  Pt c/o soreness radiating into L hip, posterolaterally  Pt reports pain/difficulty with dressing, showering, household chores, sitting, sit to stand transfers, standing/ambulatory tolerance, sleep  Pt is supposed to start working today in   Pain  At best pain ratin  At worst pain ratin  Location: lumbar spine and R posterolateral hip  Relieving factors: heat and medications      Diagnostic Tests  MRI studies: abnormal    FCE comments:  MRI showed DDD and bulging discsTreatments  Previous treatment: physical therapy, medication and injection treatment  Patient Goals  Patient goals for therapy: decreased pain, independence with ADLs/IADLs, increased strength and return to sport/leisure activities          Objective     Special Questions      Additional Special Questions  Patient denies loss of bowel/bladder control, saddle anesthesia, unexplained weight loss, history of cancer  Postural Observations    Additional Postural Observation Details  Slouched sitting posture noted  Neurological Testing     Reflexes   Left   Patellar (L4): brisk (3+)  Achilles (S1): normal (2+)    Right   Patellar (L4): brisk (3+)  Achilles (S1): normal (2+)    Additional Neurological Details  Pt denies N/T    Active Range of Motion     Additional Active Range of Motion Details  Flex=50% with pain; repeated=no change  Ext=75% with mild pain; repeated=decreased pain  RSB=wfl with mild pain; repeated=decreased pain, LSB=25% with pain  R Rot=75% with pain and L rot=75% with pain        Strength/Myotome Testing     Left Hip Planes of Motion   Flexion: 3+  Extension: 3  Abduction: 3+    Right Hip   Planes of Motion   Flexion: 3 (pain)  Extension: 3  Abduction: 3+    Left Knee   Flexion: 5  Extension: 5    Right Knee   Flexion: 5  Extension: 5    Left Ankle/Foot   Dorsiflexion: 5  Great toe extension: 5    Right Ankle/Foot   Dorsiflexion: 5  Great toe extension: 5    Tests       Thoracic   Positive slump  Lumbar   Positive slumped  Left Pelvic Girdle/Sacrum   Negative: thigh thrust      Right Pelvic Girdle/Sacrum   Negative: thigh thrust      Additional Tests Details  (+)SLR on L for increased back pain  Decreased piriformis flexibility b/l  DKTC=increased pressure   (+)TAMMI b/l   (+)ASLR on R and ASLR with stabilization  (-)SI compression in S/L, (+)prone instability, Pain with spring testing L/S        Flowsheet Rows      Most Recent Value   PT/OT G-Codes   Current Score  47   Projected Score  56          Precautions: Chronic pain, anxiety, OA, HTN, OCD    Daily Treatment Diary     Manual  8/22            R S/L L3-4 JM Grade 4-5 TP                                                                    Exercise Diary              TM amb             TA contraction             DLS march             DLS bent knee fall out             DLS kicks             Bridges-if able             Clamshells             Sciatic nerve glides seated             Posture training-chair             Mini squats             TB LPD             TB rows             TB multifidus                                                                                                            Modalities               PRN

## 2018-08-23 ENCOUNTER — TELEPHONE (OUTPATIENT)
Dept: BEHAVIORAL/MENTAL HEALTH CLINIC | Facility: CLINIC | Age: 33
End: 2018-08-23

## 2018-08-23 ENCOUNTER — CLINICAL SUPPORT (OUTPATIENT)
Dept: FAMILY MEDICINE CLINIC | Facility: CLINIC | Age: 33
End: 2018-08-23

## 2018-08-23 DIAGNOSIS — Z11.1 SCREENING FOR TUBERCULOSIS: Primary | ICD-10-CM

## 2018-08-23 LAB
INDURATION: 0 MM
TB SKIN TEST: NEGATIVE

## 2018-08-23 NOTE — TELEPHONE ENCOUNTER
Graciela Bledsoe had called stating that she is no longer seeing a therapist here but wishes to continue group if that is possible

## 2018-08-24 ENCOUNTER — NURSE TRIAGE (OUTPATIENT)
Dept: FAMILY MEDICINE CLINIC | Facility: CLINIC | Age: 33
End: 2018-08-24

## 2018-08-24 NOTE — TELEPHONE ENCOUNTER
Patient called and stated that she had a low blood presser of 82/31 yesterday at her psych  Appt  After talking to Dr Nik Oliveira we advised the patient to stop her lisinopril and offered her an appt  For today at 2pm and she stated that she could not do this cause she was leaving town and her plan were more important  Patient refused to come in for the appt  I at this point advised her that if she has any issues then she needs to go to an ER and to call us back when she can schedule an appt

## 2018-08-27 ENCOUNTER — DOCUMENTATION (OUTPATIENT)
Dept: BEHAVIORAL/MENTAL HEALTH CLINIC | Facility: CLINIC | Age: 33
End: 2018-08-27

## 2018-08-27 NOTE — PROGRESS NOTES
Pt called-- and stated that she talked to her psychiatrist and wants to be placed (back) in group  She feels like this therapist is being mad at her  She does not agree that she was suicidal on the phone and she should be allowed to continue to break the rules--she is not perfect  She wants a meeting with Bettie Bo  She is very upset and she called because she needed someone to talk to--she meant to say that she cut and was going to her brothers, but did not mention it on the message  Did not take responsibility for continuing to break boundaries--requested to be allowed to come here right now to talk, to be put through to Yamila--had to explain, she is in session, other patients are waiting  Andreea Cadena, hung up

## 2018-08-29 ENCOUNTER — OFFICE VISIT (OUTPATIENT)
Dept: PHYSICAL THERAPY | Facility: REHABILITATION | Age: 33
End: 2018-08-29
Payer: MEDICARE

## 2018-08-29 ENCOUNTER — OFFICE VISIT (OUTPATIENT)
Dept: FAMILY MEDICINE CLINIC | Facility: CLINIC | Age: 33
End: 2018-08-29
Payer: MEDICARE

## 2018-08-29 VITALS
WEIGHT: 293 LBS | TEMPERATURE: 98.3 F | DIASTOLIC BLOOD PRESSURE: 84 MMHG | HEART RATE: 78 BPM | BODY MASS INDEX: 58.7 KG/M2 | SYSTOLIC BLOOD PRESSURE: 124 MMHG | RESPIRATION RATE: 16 BRPM

## 2018-08-29 DIAGNOSIS — M51.36 LUMBAR DEGENERATIVE DISC DISEASE: Primary | ICD-10-CM

## 2018-08-29 DIAGNOSIS — I10 ESSENTIAL HYPERTENSION: Primary | Chronic | ICD-10-CM

## 2018-08-29 DIAGNOSIS — E66.01 MORBID OBESITY (HCC): ICD-10-CM

## 2018-08-29 DIAGNOSIS — M54.50 CHRONIC MIDLINE LOW BACK PAIN WITHOUT SCIATICA: ICD-10-CM

## 2018-08-29 DIAGNOSIS — E03.9 ACQUIRED HYPOTHYROIDISM: ICD-10-CM

## 2018-08-29 DIAGNOSIS — G89.29 CHRONIC MIDLINE LOW BACK PAIN WITHOUT SCIATICA: ICD-10-CM

## 2018-08-29 DIAGNOSIS — Z23 NEED FOR INFLUENZA VACCINATION: ICD-10-CM

## 2018-08-29 DIAGNOSIS — F42.2 MIXED OBSESSIONAL THOUGHTS AND ACTS: ICD-10-CM

## 2018-08-29 PROCEDURE — 97110 THERAPEUTIC EXERCISES: CPT

## 2018-08-29 PROCEDURE — 90686 IIV4 VACC NO PRSV 0.5 ML IM: CPT

## 2018-08-29 PROCEDURE — 97112 NEUROMUSCULAR REEDUCATION: CPT

## 2018-08-29 PROCEDURE — 99213 OFFICE O/P EST LOW 20 MIN: CPT | Performed by: FAMILY MEDICINE

## 2018-08-29 NOTE — PROGRESS NOTES
Daily Note     Today's date: 2018  Patient name: Ileana Geller  : 1985  MRN: [id number]  Referring provider: Baron Patel DO  Dx:   Encounter Diagnosis     ICD-10-CM    1  Lumbar degenerative disc disease M51 36    2  Chronic midline low back pain without sciatica M54 5     G89 29                   Subjective: Pt reported intermittent pain and L hip tightness  Objective: See treatment diary below  Manual                     R S/L L3-4 JM Grade 4-5 TP  TP                                                                                                                         Exercise Diary                        TM amb  6'                     TA contraction  5"x2'                     DLS  ea                      DLS bent knee fall out  10 ea                      DLS kicks  83 ea                      Bridges-if able                       Clamshells  10ea                      Sciatic nerve glides seated  3"x10 ea                      Posture training-chair  2'                     Mini squats                       TB LPD  grn tb x20                     TB rows  grn tb x20                     TB multifidus  grn tb x20 ea                                                                                                                                                                                                    Modalities                         PRN 10'                                                                            Assessment: Tolerated treatment well  Patient demonstrated fatigue post treatment and exhibited good technique with therapeutic exercises  Vc's needed for correct technique throughout initiation of session  Pt noted some discomfort in L hip today  Relief noted with manuals and MH  Plan: Continue per plan of care

## 2018-08-29 NOTE — ASSESSMENT & PLAN NOTE
Lisinopril 10 mg was discontinued due to low blood pressure readings  Blood pressure in the office today is 124/84  Recommended to stay well hydrated  Will keep patient off Lisinopril

## 2018-08-29 NOTE — PROGRESS NOTES
Chief Complaint   Patient presents with    Follow-up     Follow up for BP has had low readings and medication adjustment  Health Maintenance   Topic Date Due    DTaP,Tdap,and Td Vaccines (1 - Tdap) 08/18/2005    INFLUENZA VACCINE  09/01/2018    PT PLAN OF CARE  09/21/2018    PAP SMEAR  02/26/2021     Assessment/Plan:    Hypertension  Lisinopril 10 mg was discontinued due to low blood pressure readings  Blood pressure in the office today is 124/84  Recommended to stay well hydrated  Will keep patient off Lisinopril  Hypothyroidism  Continue levothyroxine 100 mcg daily  Morbid obesity ([address] )  Encouraged regular exercise, weight reduction  Obsessive-compulsive disorder  Management per psychiatry  Keep follow-up visit in 11/18 as scheduled  Diagnoses and all orders for this visit:    Essential hypertension    Acquired hypothyroidism    Mixed obsessional thoughts and acts    Morbid obesity ([address] )    Need for influenza vaccination  -     influenza vaccine, [id number], quadrivalent, 0 5 mL, preservative-free (SYRINGE, SINGLE-DOSE VIAL), for adult and pediatric patients 3 yr+ ([facility], [place]          Subjective:      Patient ID: Ivet Perez is a 28 y o  female  HPI     Patient presents to the office for follow-up visit for HTN,   review medical therapy  She reports low BP readings since July  She has been followed by psychiatrist, going for psychotherapy weekly  Patient states that she was initially diagnosed with bipolar disorder, but now has been treated for OCD  She has some changes in psychiatric meds  Patient reports low blood pressure reading at psychiatry office  On 7/19 /18  BP was 98/62  On 8/23/18   BP was 82/31, heart rate 76  Patient was advised to stop taking Lisinopril 10 mg daily  She stopped taking Lisinopril on Saturday, 8/25/18  Patient was asymptomatic when had low BP    Denies dizziness, chest pain, shortness of breath, heart palpitations  Blood pressure in our office today is 124/84  Patient has Hypothyroidism  She takes Levothyroxine 100 mcg daily  TSH was 1 501 in 7/18  Patient has morbid obesity  She c/o weight gain which she attributes to taking psychiatric meds  The following portions of the patient's history were reviewed and updated as appropriate: allergies, current medications, past family history, past medical history, past social history, past surgical history and problem list     Review of Systems   Constitutional: Positive for fatigue (mild)  Negative for activity change, appetite change, chills and fever  HENT: Negative  Eyes: Negative for pain, discharge, redness, itching and visual disturbance  Respiratory: Negative for cough, chest tightness, shortness of breath and wheezing  Cardiovascular: Negative for chest pain, palpitations and leg swelling  Gastrointestinal: Negative for abdominal pain, constipation, diarrhea, nausea and vomiting  Genitourinary: Negative for difficulty urinating, dysuria, flank pain, frequency and hematuria  Musculoskeletal: Positive for back pain (chronic low back pain)  Negative for joint swelling  Skin: Negative for rash and wound  Neurological: Negative for syncope and headaches  Hematological: Negative  Psychiatric/Behavioral: Positive for sleep disturbance  Negative for suicidal ideas  OCD/ Depression         Objective:      /84 (BP Location: Left arm, Patient Position: Sitting, Cuff Size: Standard)   Pulse 78   Temp 98 3 °F (36 8 °C) (Tympanic)   Resp 16   Wt (!) 170 kg (374 lb 12 8 oz)   LMP 08/06/2018   BMI 58 70 kg/m²        Physical Exam   Constitutional: She appears well-developed and well-nourished  Morbidly obese   HENT:   Head: Normocephalic and atraumatic  Right Ear: External ear normal    Mouth/Throat: Oropharynx is clear and moist    Eyes: Conjunctivae are normal  Pupils are equal, round, and reactive to light  Cardiovascular: Normal rate, regular rhythm and normal heart sounds  No murmur heard  No BL LE edema   Pulmonary/Chest: Effort normal and breath sounds normal  She has no wheezes  She has no rales  Abdominal: Soft  Bowel sounds are normal  There is no tenderness  Musculoskeletal: Normal range of motion  She exhibits no edema, tenderness or deformity  Skin: Skin is warm and dry  Psychiatric: She has a normal mood and affect  Nursing note and vitals reviewed

## 2018-08-30 DIAGNOSIS — N92.1 IRREGULAR INTERMENSTRUAL BLEEDING: Primary | ICD-10-CM

## 2018-08-31 ENCOUNTER — OFFICE VISIT (OUTPATIENT)
Dept: PHYSICAL THERAPY | Facility: REHABILITATION | Age: 33
End: 2018-08-31
Payer: MEDICARE

## 2018-08-31 DIAGNOSIS — M54.50 CHRONIC MIDLINE LOW BACK PAIN WITHOUT SCIATICA: Primary | ICD-10-CM

## 2018-08-31 DIAGNOSIS — G89.29 CHRONIC MIDLINE LOW BACK PAIN WITHOUT SCIATICA: Primary | ICD-10-CM

## 2018-08-31 DIAGNOSIS — M51.36 LUMBAR DEGENERATIVE DISC DISEASE: ICD-10-CM

## 2018-08-31 PROCEDURE — 97110 THERAPEUTIC EXERCISES: CPT | Performed by: PHYSICAL THERAPIST

## 2018-08-31 PROCEDURE — 97112 NEUROMUSCULAR REEDUCATION: CPT | Performed by: PHYSICAL THERAPIST

## 2018-08-31 NOTE — PROGRESS NOTES
Daily Note     Today's date: 2018  Patient name: Kemal Dinh  : 1985  MRN: [id number]  Referring provider: Yessenia Love DO  Dx:   Encounter Diagnosis     ICD-10-CM    1  Chronic midline low back pain without sciatica M54 5     G89 29    2  Lumbar degenerative disc disease M51 36            1on1 entire session  Subjective: Pt reports some L hip pain today  Objective: See treatment diary below  Manual                   R S/L L3-4 JM Grade 4-5 TP  TP Gr 4  TP Gr 4                                                                                                                       Exercise Diary                      TM amb  6'  8'                   TA contraction  5"x2'  5"x2'                   DLS  ea   49WC                   DLS bent knee fall out  10 ea   10ea                   DLS kicks  36 ea   30AV                   Bridges-if able                       Clamshells  10ea  [place]                   Sciatic nerve glides seated  3"x10 ea   3"x10ea                   Posture training-chair  2'  2'                   Mini squats    10                   TB LPD  grn tb x20 gtb x20                   TB rows  grn tb x20  gtb x20                   TB multifidus  grn tb x20 ea   gtb x20                                                                                                                                                                                                 Modalities                      MH PRN 10'  10'                                                 Assessment: Tolerated treatment well  Patient requires VC's for correct technique with TE performance  No pain reported with TE performance  Relief with manuals  Plan: Continue per plan of care  Progress treatment as tolerated

## 2018-08-31 NOTE — PROGRESS NOTES
Assessment/Plan:      Diagnoses and all orders for this visit:    Lumbar degenerative disc disease    Bulging lumbar disc    Chronic midline low back pain without sciatica    Iliotibial band syndrome of left side  -     Ambulatory referral to Physical Therapy; Future  -     meloxicam (MOBIC) 7 5 mg tablet; Take 1 after breakfast and dinner daily as needed for pain and sciatica  Piriformis syndrome of left side  -     Ambulatory referral to Physical Therapy; Future  -     meloxicam (MOBIC) 7 5 mg tablet; Take 1 after breakfast and dinner daily as needed for pain and sciatica  M*Modal software was used to dictate this note  It may contain errors with dictating incorrect words/spelling  Please contact provider directly with any questions  Subjective:     Patient ID: Dustin Paulson is a 28 y o  female  HPI Last seen 8/7/18 for consultation  Was referred from PCP  Presents today for follow-up of chronic episodic low back and hip pain after initiating physical therapy ordered at last visit  Started PTx 8/22  Had a flair up of LBP after sitting at [facility], some improvement with PTx, Pattern  is left buttock, radiates to knee, not below, denies numbness, tingling or weakness  Had taken advil and hydrocodone  Had been seen by PCP advised not to take hydrocodone       PAST MEDICAL HISTORY  Past Medical History:   Diagnosis Date    Anorexia nervosa in remission     Anxiety     Back pain     Bipolar disorder ([address] )     Depression     Esophageal reflux 8/15/2013    Hypertension     Hypothyroid     Idiopathic intracranial hypertension     Lumbar degenerative disc disease 5/8/2014    Obesity     Obsessive-compulsive disorder     Overactive bladder     Psychiatric disorder     Restless leg syndrome, controlled     Seasonal allergies     Seizure ([address] )     Suicide and self-inflicted injury [address] [address]      PAST SURGICAL HISTORY  Past Surgical History:   Procedure Laterality Date    DENTAL SURGERY      SINUS ENDOSCOPY      TONSILLECTOMY         FAMILY HISTORY  Family History   Problem Relation Age of Onset    Depression Mother     Suicide Attempts Mother     Hypertension Mother     Diabetes Mother     Other Mother         bicuspid aortic valve    Hypertension Father     Hypothyroidism Father     Mental illness Father     Thyroid disease Father     Hypertension Brother     Cancer Maternal Grandmother     Heart disease Maternal Grandmother     Stroke Maternal Grandmother     Breast cancer Maternal Grandmother     Pneumonia Maternal Grandfather     Cancer Maternal Grandfather     Cancer Paternal Grandmother     Pneumonia Paternal Grandfather     Arthritis Family     Breast cancer Family     Osteopenia Family     Osteoporosis Family     Hypertension Family     Transient ischemic attack Family      HOME MEDICATIONS  Current Outpatient Prescriptions   Medication Sig Dispense Refill    buPROPion (WELLBUTRIN XL) 300 mg 24 hr tablet       Cholecalciferol (VITAMIN D3) 5000 units CAPS Take 5,000 Units by mouth daily      clomiPRAMINE (ANAFRANIL) 50 mg capsule Take 100 mg by mouth 2 (two) times a day      gabapentin (NEURONTIN) 600 MG tablet Take two tabs qhs 60 tablet 2    HYDROcodone-acetaminophen (NORCO) 5-325 mg per tablet Take 1 tablet by mouth 2 (two) times a day as needed for pain (severe pain)      LORazepam (ATIVAN) 1 mg tablet Take 1 mg by mouth 3 (three) times a day as needed for anxiety      lurasidone (LATUDA) 80 mg tablet Take 20 mg by mouth daily with breakfast      Mirabegron ER 25 MG TB24 Take 25 mg by mouth daily 30 tablet 6    montelukast (SINGULAIR) 10 mg tablet TAKE 1 TABLET (10 MG TOTAL) BY MOUTH DAILY AT BEDTIME 30 tablet 2    Multiple Vitamin (MULTIVITAMIN) capsule Take 1 capsule by mouth daily      RESTASIS 0 05 % ophthalmic emulsion PLACE 1 DROP INTO BOTH EYES TWICE A DAY  3    rOPINIRole (REQUIP) 4 mg tablet TAKE 1 TABLET BY MOUTH EVERY 12 HOURS FOR 60 DAYS TAKE IN THE MORNING AND AT BEDTIME 60 tablet 0    sertraline (ZOLOFT) 100 mg tablet Take 200 mg by mouth daily  0    lamoTRIgine (LaMICtal) 150 MG tablet Take 2 tablets by mouth daily at bedtime for 30 days 60 tablet 0    levothyroxine 100 mcg tablet Take 1 tablet (100 mcg total) by mouth daily in the early morning for 30 days 30 tablet 5    meloxicam (MOBIC) 7 5 mg tablet Take 1 after breakfast and dinner daily as needed for pain and sciatica  60 tablet 0    omeprazole (PriLOSEC) 20 mg delayed release capsule Take 1 capsule (20 mg total) by mouth daily in the early morning for 30 days 90 capsule 1     No current facility-administered medications for this visit  ALLERGIES  Other      SOCIAL HISTORY  History   Alcohol Use No     History   Drug Use No     History   Smoking Status    Never Smoker   Smokeless Tobacco    Never Used       Review of Systems   Respiratory: Negative  Cardiovascular: Negative  Gastrointestinal: Negative  Genitourinary: Negative  Neurological: Negative for weakness and numbness  Psychiatric/Behavioral: Positive for dysphoric mood  The patient is nervous/anxious  Objective:  Vitals:    09/04/18 0904   BP: 120/80   Pulse: 92       Last Physical Therapy Note:  Burdette Sandifer, PT at 8/29/2018 12:38 PM  Assessment: Tolerated treatment well  Patient demonstrated fatigue post treatment and exhibited good technique with therapeutic exercises  Vc's needed for correct technique throughout initiation of session  Pt noted some discomfort in L hip today  Relief noted with manuals and MH  Plan: Continue per plan of care       Labs:   Ref Range & Units 7/7/18  6:01 PM   Total CK 26 - 192 U/L 103         Ref Range & Units 7/7/18  6:01 PM   TSH 3RD GENERATON 0 358 - 3 740 uIU/mL 1 510         Ref Range & Units 5/17/18  2:51 PM   Sed Rate 0 - 20 mm/hour 17         Ref Range & Units 5/17/18  2:51 PM   Rheumatoid Factor Negative Negative         Ref Range & Units 5/17/18  2:51 PM JANE Negative Negative       Ref Range & Units 1/9/18  6:49 AM     Vit D, 25-Hydroxy 30 0 - 100 0 ng/mL 44 0        PD MP was accessed the patient has not had an opioids since November 2017  Physical Exam   Constitutional: She appears well-developed and well-nourished  She appears distressed  HENT:   Head: Normocephalic  Eyes: Pupils are equal, round, and reactive to light  Musculoskeletal:        Left hip: She exhibits tenderness and bony tenderness  She exhibits normal range of motion, normal strength and no swelling  Marked palpatory tenderness of the left piriformis greater trochanteric bursa gluteus medius tendon as well as the proximal ITB  Neurological: She displays no atrophy and no tremor  She exhibits normal muscle tone  She displays no seizure activity  Coordination and gait normal    Reflex Scores:       Tricep reflexes are 2+ on the right side and 2+ on the left side  Bicep reflexes are 2+ on the right side and 2+ on the left side  Brachioradialis reflexes are 2+ on the right side and 2+ on the left side  Patellar reflexes are 2+ on the right side and 2+ on the left side  Achilles reflexes are 2+ on the right side and 2+ on the left side

## 2018-09-03 DIAGNOSIS — T78.40XA ALLERGIC DISORDER, INITIAL ENCOUNTER: ICD-10-CM

## 2018-09-03 DIAGNOSIS — G25.81 RESTLESS LEG SYNDROME: ICD-10-CM

## 2018-09-04 ENCOUNTER — TELEPHONE (OUTPATIENT)
Dept: GYNECOLOGY | Facility: CLINIC | Age: 33
End: 2018-09-04

## 2018-09-04 ENCOUNTER — OFFICE VISIT (OUTPATIENT)
Dept: PHYSICAL THERAPY | Facility: REHABILITATION | Age: 33
End: 2018-09-04
Payer: MEDICARE

## 2018-09-04 ENCOUNTER — OFFICE VISIT (OUTPATIENT)
Dept: PAIN MEDICINE | Facility: CLINIC | Age: 33
End: 2018-09-04
Payer: MEDICARE

## 2018-09-04 VITALS
BODY MASS INDEX: 45.99 KG/M2 | WEIGHT: 293 LBS | DIASTOLIC BLOOD PRESSURE: 80 MMHG | SYSTOLIC BLOOD PRESSURE: 120 MMHG | HEIGHT: 67 IN | HEART RATE: 92 BPM

## 2018-09-04 DIAGNOSIS — M76.32 ILIOTIBIAL BAND SYNDROME OF LEFT SIDE: ICD-10-CM

## 2018-09-04 DIAGNOSIS — G57.02 PIRIFORMIS SYNDROME OF LEFT SIDE: ICD-10-CM

## 2018-09-04 DIAGNOSIS — G89.29 CHRONIC MIDLINE LOW BACK PAIN WITHOUT SCIATICA: ICD-10-CM

## 2018-09-04 DIAGNOSIS — M51.26 BULGING LUMBAR DISC: ICD-10-CM

## 2018-09-04 DIAGNOSIS — M51.36 LUMBAR DEGENERATIVE DISC DISEASE: Primary | ICD-10-CM

## 2018-09-04 DIAGNOSIS — G89.29 CHRONIC MIDLINE LOW BACK PAIN WITHOUT SCIATICA: Primary | ICD-10-CM

## 2018-09-04 DIAGNOSIS — M62.89 PELVIC FLOOR DYSFUNCTION: Primary | ICD-10-CM

## 2018-09-04 DIAGNOSIS — M51.36 LUMBAR DEGENERATIVE DISC DISEASE: ICD-10-CM

## 2018-09-04 DIAGNOSIS — M54.50 CHRONIC MIDLINE LOW BACK PAIN WITHOUT SCIATICA: ICD-10-CM

## 2018-09-04 DIAGNOSIS — M54.50 CHRONIC MIDLINE LOW BACK PAIN WITHOUT SCIATICA: Primary | ICD-10-CM

## 2018-09-04 PROCEDURE — 97110 THERAPEUTIC EXERCISES: CPT

## 2018-09-04 PROCEDURE — 97112 NEUROMUSCULAR REEDUCATION: CPT

## 2018-09-04 PROCEDURE — 99213 OFFICE O/P EST LOW 20 MIN: CPT | Performed by: PHYSICAL MEDICINE & REHABILITATION

## 2018-09-04 RX ORDER — MONTELUKAST SODIUM 10 MG/1
10 TABLET ORAL
Qty: 30 TABLET | Refills: 2 | Status: SHIPPED | OUTPATIENT
Start: 2018-09-04 | End: 2018-10-11 | Stop reason: ALTCHOICE

## 2018-09-04 RX ORDER — ROPINIROLE 4 MG/1
TABLET, FILM COATED ORAL
Qty: 60 TABLET | Refills: 0 | Status: SHIPPED | OUTPATIENT
Start: 2018-09-04 | End: 2018-09-30 | Stop reason: SDUPTHER

## 2018-09-04 RX ORDER — MELOXICAM 7.5 MG/1
TABLET ORAL
Qty: 60 TABLET | Refills: 0 | Status: SHIPPED | OUTPATIENT
Start: 2018-09-04 | End: 2018-10-02 | Stop reason: SDUPTHER

## 2018-09-04 NOTE — LETTER
September 4, 2018     Kailey Faustin, [address]    Patient: Dustin Paulson   YOB: 1985   Date of Visit: 9/4/2018       Dear Dr Hedy Garsiaer:    Thank you for referring Michelle Miller to me for evaluation  Below are the relevant portions of my assessment and plan of care  Diagnoses and all orders for this visit:    Lumbar degenerative disc disease    Bulging lumbar disc    Chronic midline low back pain without sciatica        If you have questions, please do not hesitate to call me  I look forward to following Avinash Mejia along with you           Sincerely,        Joe Johnson,         CC: No Recipients

## 2018-09-04 NOTE — PROGRESS NOTES
Daily Note     Today's date: 2018  Patient name: Daniela Estevez  : 1985  MRN: [id number]  Referring provider: Danika Barnes DO  Dx:   Encounter Diagnosis     ICD-10-CM    1  Chronic midline low back pain without sciatica M54 5     G89 29    2  Lumbar degenerative disc disease M51 36                   Subjective: Pt reported MD would like to add L ITB syndrome and L piriformis syndrome to her treatment  She noted some discomfort in L hip  Objective: See treatment diary below  Manual    9               R S/L L3-4 JM Grade 4-5 TP  TP Gr 4  TP Gr 4  np                                                                                                                     Exercise Diary     94                 TM amb  6'  8'  np                 TA contraction  5"x2'  5"x2'  5"x2'                 DLS  ea   51GK  15 ea                  DLS bent knee fall out  10 ea   10ea  10 ea                  DLS kicks  47 ea   40XQ  15 ea                  Bridges-if able                       Clamshells  10ea   79US  50 ea                  Sciatic nerve glides seated  3"x10 ea   3"x10ea  3"x10 ea                  Posture training-chair  2'  2'  2'                 Mini squats    10  10                 TB LPD  grn tb x20 gtb x20  gtb x20                 TB rows  grn tb x20  gtb x20  gtb x20                 TB multifidus  grn tb x20 ea   gtb x20  gtb x20 ea                  bike     5'                  side stepping     x3                 piriformis stretch     10"x10                  ITB stretch      10"x5                                                                                               Modalities     9/4                 MH PRN 10'  10'  10'                                                Assessment: Tolerated treatment well  Patient demonstrated fatigue post treatment and exhibited good technique with therapeutic exercises  Added exercises to good tolerance  Held TM as per MD orders  Monitor symptoms NV  Relief with Mh       Plan: Continue per plan of care

## 2018-09-05 ENCOUNTER — APPOINTMENT (OUTPATIENT)
Dept: PHYSICAL THERAPY | Facility: REHABILITATION | Age: 33
End: 2018-09-05
Payer: MEDICARE

## 2018-09-06 ENCOUNTER — OFFICE VISIT (OUTPATIENT)
Dept: FAMILY MEDICINE CLINIC | Facility: CLINIC | Age: 33
End: 2018-09-06
Payer: MEDICARE

## 2018-09-06 VITALS
SYSTOLIC BLOOD PRESSURE: 140 MMHG | WEIGHT: 293 LBS | TEMPERATURE: 98.3 F | DIASTOLIC BLOOD PRESSURE: 84 MMHG | BODY MASS INDEX: 58.48 KG/M2 | RESPIRATION RATE: 16 BRPM | HEART RATE: 80 BPM

## 2018-09-06 DIAGNOSIS — D22.9 MULTIPLE NEVI: ICD-10-CM

## 2018-09-06 DIAGNOSIS — L98.9 SKIN LESION: Primary | ICD-10-CM

## 2018-09-06 PROCEDURE — 99213 OFFICE O/P EST LOW 20 MIN: CPT | Performed by: FAMILY MEDICINE

## 2018-09-06 NOTE — PROGRESS NOTES
Chief Complaint   Patient presents with    Mass     Lump on left side of forehead has bleed for 1 week  Assessment/Plan:    Patient presents with raised scabbed skin lesion on left forehead which started bleeding 2 days ago  Family history is positive for skin cancer  Patient has multiple pigmented nevi  Recommended dermatology evaluation  Appointment scheduled with dermatologist Dr Samantha Gee on 10/4/18  Diagnoses and all orders for this visit:    Skin lesion    Multiple nevi          Subjective:      Patient ID: Daniela Estevez is a 28 y o  female  HPI     Patient presents with scabbed skin lesion on left forehead which  started bleeding 2 days ago  Patient has multiple pigmented moles  She states that she had 1 skin lesion removed from her back in the past which was not cancerous  Family history is positive for skin cancer in her maternal grandmother  Patient states that she was tanning previously  The following portions of the patient's history were reviewed and updated as appropriate: allergies, current medications, past family history, past medical history, past social history, past surgical history and problem list     Review of Systems   Constitutional: Positive for fatigue  Negative for activity change, chills and unexpected weight change  HENT: Negative  Eyes: Negative for pain, discharge, redness, itching and visual disturbance  Respiratory: Negative for cough, chest tightness, shortness of breath and wheezing  Cardiovascular: Negative for chest pain, palpitations and leg swelling  Gastrointestinal: Negative  Musculoskeletal: Positive for back pain (chronic back pain)  Skin: Negative for rash and wound  Neurological: Negative for dizziness and headaches  Hematological: Negative            Objective:      /84 (BP Location: Left arm, Patient Position: Sitting, Cuff Size: Large)   Pulse 80   Temp 98 3 °F (36 8 °C) (Tympanic)   Resp 16   Wt (!) 169 kg (373 lb 6 4 oz)   BMI 58 48 kg/m²          Physical Exam   Constitutional: She appears well-developed and well-nourished  Morbidly obese   HENT:   Head: Normocephalic and atraumatic  Right Ear: External ear normal    Left Ear: External ear normal    Mouth/Throat: Oropharynx is clear and moist    Eyes: Conjunctivae are normal  Pupils are equal, round, and reactive to light  Cardiovascular: Normal rate, regular rhythm and normal heart sounds  No murmur heard  No BL LE edema   Pulmonary/Chest: Effort normal and breath sounds normal  She has no wheezes  She has no rales  Skin: Skin is dry  Multiple pigmented moles on arms, back  There is raised scabbed skin lesion 3 x 3 mm on L forehead  Nursing note and vitals reviewed

## 2018-09-07 ENCOUNTER — APPOINTMENT (OUTPATIENT)
Dept: PHYSICAL THERAPY | Facility: REHABILITATION | Age: 33
End: 2018-09-07
Payer: MEDICARE

## 2018-09-11 ENCOUNTER — TELEPHONE (OUTPATIENT)
Dept: UROLOGY | Facility: AMBULATORY SURGERY CENTER | Age: 33
End: 2018-09-11

## 2018-09-11 ENCOUNTER — OFFICE VISIT (OUTPATIENT)
Dept: PHYSICAL THERAPY | Facility: REHABILITATION | Age: 33
End: 2018-09-11
Payer: MEDICARE

## 2018-09-11 DIAGNOSIS — M54.50 CHRONIC MIDLINE LOW BACK PAIN WITHOUT SCIATICA: Primary | ICD-10-CM

## 2018-09-11 DIAGNOSIS — M51.36 LUMBAR DEGENERATIVE DISC DISEASE: ICD-10-CM

## 2018-09-11 DIAGNOSIS — G57.02 PIRIFORMIS SYNDROME OF LEFT SIDE: ICD-10-CM

## 2018-09-11 DIAGNOSIS — G89.29 CHRONIC MIDLINE LOW BACK PAIN WITHOUT SCIATICA: Primary | ICD-10-CM

## 2018-09-11 DIAGNOSIS — M76.32 ILIOTIBIAL BAND SYNDROME OF LEFT SIDE: ICD-10-CM

## 2018-09-11 PROCEDURE — G8990 OTHER PT/OT CURRENT STATUS: HCPCS | Performed by: PHYSICAL THERAPIST

## 2018-09-11 PROCEDURE — 97112 NEUROMUSCULAR REEDUCATION: CPT | Performed by: PHYSICAL THERAPIST

## 2018-09-11 PROCEDURE — G8991 OTHER PT/OT GOAL STATUS: HCPCS | Performed by: PHYSICAL THERAPIST

## 2018-09-11 PROCEDURE — 97110 THERAPEUTIC EXERCISES: CPT | Performed by: PHYSICAL THERAPIST

## 2018-09-11 NOTE — TELEPHONE ENCOUNTER
Patient sent an email to nursing stating she was having car troubles and can not make her appt tomorrow 9/12 at 11  She was hoping to get in on Friday instead  Called and left a message for patient that we could do 9/14 at 1130  Encouraged her to call back to confirm this appt,

## 2018-09-11 NOTE — PROGRESS NOTES
Daily Note     Today's date: 2018  Patient name: Radha Motley  : 1985  MRN: [id number]  Referring provider: Dieter Batista DO  Dx:   Encounter Diagnosis     ICD-10-CM    1  Chronic midline low back pain without sciatica M54 5     G89 29    2  Lumbar degenerative disc disease M51 36    3  Iliotibial band syndrome of left side M76 32    4  Piriformis syndrome of left side G57 02            1on 950-1030;  2302-1563  Subjective: Pt reports having some L posterior thigh pain today  Pt states she has been having an issue with low blood pressure from her Lisinopril medication so she has stopped taking it  Pt's BP prior to treatment was 162/100  Pt to contact her PCP regarding this issue        Objective: See treatment diary below  Manual               R S/L L3-4 JM Grade 4-5 TP  TP Gr 4  TP Gr 4  np  np                                                                                                                   Exercise Diary                  TM amb  6'  8'  np D/c               TA contraction  5"x2'  5"x2'  5"x2'  5"x2'               DLS  ea   36EY  07 ea  Roca Hilary               DLS bent knee fall out  10 ea   10ea  10 ea  Roca Hilary               DLS kicks  92 ea   24YI  15 ea  Roca Hilary               Bridges-if able        3"x10               Clamshells  10ea   10GR  43 ea  Roca Hilary               Sciatic nerve glides seated  3"x10 ea   3"x10ea  3"x10 ea   5"x10ea               Posture training-chair  2'  2'  2'  np               Mini squats    10  10  10               TB LPD  grn tb x20 gtb x20  gtb x20  gtb x20               TB rows  grn tb x20  gtb x20  gtb x20  gtb x20               TB multifidus  grn tb x20 ea   gtb x20  gtb x20 ea  gtb x20ea                bike     5'  Np-hel                side stepping     x3  x3               piriformis stretch     10"x10  10"x10                ITB stretch      10"x5  10"x10                TB L hip IE/ER seated        otb x15ea                                                                     Modalities   8/29 8/31 9/4 9/11               MH PRN 10'  10'  10'  10'                                             Assessment: Tolerated treatment well  Patient required some VC's and TC's for correct technique with TE's  No increased pain with TE performance  No symptoms with her BP t/o session  Plan: Continue per plan of care

## 2018-09-14 ENCOUNTER — PROCEDURE VISIT (OUTPATIENT)
Dept: UROLOGY | Facility: AMBULATORY SURGERY CENTER | Age: 33
End: 2018-09-14
Payer: MEDICARE

## 2018-09-14 VITALS — SYSTOLIC BLOOD PRESSURE: 108 MMHG | DIASTOLIC BLOOD PRESSURE: 72 MMHG | HEIGHT: 67 IN | HEART RATE: 72 BPM

## 2018-09-14 DIAGNOSIS — R35.0 URINARY FREQUENCY: ICD-10-CM

## 2018-09-14 DIAGNOSIS — N39.41 URGE INCONTINENCE OF URINE: ICD-10-CM

## 2018-09-14 DIAGNOSIS — N32.81 OVERACTIVE BLADDER: ICD-10-CM

## 2018-09-14 PROCEDURE — 64566 NEUROELTRD STIM POST TIBIAL: CPT | Performed by: UROLOGY

## 2018-09-14 NOTE — PROGRESS NOTES
2018    [facility]  1985  [id number]    Diagnosis  Chief Complaint     Urinary Incontinence; Urinary Urgency; Urinary Frequency         Patient presents for PTNS 16 of 24 managed by Dr Katherine Dumont  F/tino in 1 month          Procedure PTNS  Session 16     Ankle used: right  Treatment settin  Duration of treatment: 30 minutes  Lead lot #:[id number]  Expiration Date:3/28/2021  Feeling/Response: foot/ankle    Patient Goals and Progress  Decreased Urgency yes  Decreased Frequency Yes  Episodes of incontinence No  Sleep Through Night No  Related Health and Social Factors No                      Mary Kay Edmond

## 2018-09-17 ENCOUNTER — APPOINTMENT (OUTPATIENT)
Dept: PHYSICAL THERAPY | Facility: REHABILITATION | Age: 33
End: 2018-09-17
Payer: MEDICARE

## 2018-09-18 ENCOUNTER — TELEPHONE (OUTPATIENT)
Dept: UROLOGY | Facility: CLINIC | Age: 33
End: 2018-09-18

## 2018-09-18 ENCOUNTER — TELEPHONE (OUTPATIENT)
Dept: BEHAVIORAL/MENTAL HEALTH CLINIC | Facility: CLINIC | Age: 33
End: 2018-09-18

## 2018-09-18 ENCOUNTER — TELEPHONE (OUTPATIENT)
Dept: FAMILY MEDICINE CLINIC | Facility: CLINIC | Age: 33
End: 2018-09-18

## 2018-09-18 NOTE — TELEPHONE ENCOUNTER
Called Cathy back - she talked about how difficult things are with Wang Martin and how hard it is for her as her mom to deal with her  She asked why she can not come back to group here and I told her that she had broken a contract with her therapist and that we did refer her and make sure she was active with new providers  Mom said yes she is  I also informed mom that we referred her to trauma group at [facility]  Mom was thankful and unaware that we had done that  Thanked us for all we have done for Wang Martin  Phone call happened 9/17/18   Documented today 9/18/18

## 2018-09-18 NOTE — TELEPHONE ENCOUNTER
Patient phoned, the skin lesion she had examined in September has grown double in size  She has an appointment with the dermatologist on 10/4/2018 - should she come in to be seen here prior to her dermatologist department? Patient can be contacted at [phone]

## 2018-09-19 ENCOUNTER — APPOINTMENT (OUTPATIENT)
Dept: PHYSICAL THERAPY | Facility: REHABILITATION | Age: 33
End: 2018-09-19
Payer: MEDICARE

## 2018-09-19 ENCOUNTER — TELEPHONE (OUTPATIENT)
Dept: FAMILY MEDICINE CLINIC | Facility: CLINIC | Age: 33
End: 2018-09-19

## 2018-09-19 NOTE — TELEPHONE ENCOUNTER
I left a message yesterday, but today I spoke to Dr Koki Marie office, they said they talked to patient yesterday and let her know there is no sooner availability, they put her on the cancellation list and will call to move her up if they have any openings

## 2018-09-19 NOTE — TELEPHONE ENCOUNTER
----- Message from Buddy Curtis MD sent at 9/18/2018  3:54 PM EDT -----  Regarding: FW: Non-Urgent Medical Question  Contact: [phone]  I reviewed pictures that patient sent to the office  Spoke with patient over the phone  She has appointment is dermatologist Dr Gemma Contreras on October 4, 2018  Please contact dermatology to schedule sooner appointment due to rapidly growing skin lesion on forehead    ----- Message -----  From: Ayanna Scott MA  Sent: 9/18/2018   2:26 PM  To: Buddy Curtis MD  Subject: Zarakatarina Mary Lou: Non-Urgent Medical Question                  Please advise  ----- Message -----  From: Rayne Millan  Sent: 9/18/2018  12:49 PM  To: [facility]  Subject: Non-Urgent Medical Question                      Hello-    I contacted the front office to figure out what to do regarding the growth on my head  I saw you roughly 2 weeks ago and at that point I was able to get into dermatology appt on October 4th  That was the earliest I could get  Since then the growth has at least doubled in size  I thought sending these pictures might be helpful when considering if I need to be seen sooner      Thank you,  Tim Saab

## 2018-09-19 NOTE — TELEPHONE ENCOUNTER
Patient emailed this to office thru patient portal   Please advise  Hello-     I was taking both Ditropan and 50mg of Mybetriq at one point   After I went through Pelvic Floor PT the Ditropan was stopped, then Mybetriq was decreased to 25mg about a month ago  Navi Ceballos stop taking Mybetriq all together?  I am no longer having any issues       Thanks,   Valencia Thomas

## 2018-09-20 NOTE — TELEPHONE ENCOUNTER
Patient called this morning saying she has questions about her discharge  She asked if you could give her a call Myra  She feels her mom did not communicate with her accurately   [phone]

## 2018-09-24 ENCOUNTER — APPOINTMENT (OUTPATIENT)
Dept: PHYSICAL THERAPY | Facility: REHABILITATION | Age: 33
End: 2018-09-24
Payer: MEDICARE

## 2018-09-25 ENCOUNTER — TELEPHONE (OUTPATIENT)
Dept: BEHAVIORAL/MENTAL HEALTH CLINIC | Facility: CLINIC | Age: 33
End: 2018-09-25

## 2018-09-25 DIAGNOSIS — G25.81 RESTLESS LEG SYNDROME: ICD-10-CM

## 2018-09-25 RX ORDER — GABAPENTIN 600 MG/1
TABLET ORAL
Qty: 60 TABLET | Refills: 2 | Status: SHIPPED | OUTPATIENT
Start: 2018-09-25 | End: 2018-12-13 | Stop reason: SDUPTHER

## 2018-09-25 NOTE — TELEPHONE ENCOUNTER
Damaso Jewell shares that she wanted clarification about the conversation with her mom  Also talked about [employer]  Requested a closure meeting

## 2018-09-26 ENCOUNTER — APPOINTMENT (OUTPATIENT)
Dept: PHYSICAL THERAPY | Facility: REHABILITATION | Age: 33
End: 2018-09-26
Payer: MEDICARE

## 2018-09-30 DIAGNOSIS — K21.9 GASTROESOPHAGEAL REFLUX DISEASE WITHOUT ESOPHAGITIS: ICD-10-CM

## 2018-09-30 DIAGNOSIS — G25.81 RESTLESS LEG SYNDROME: ICD-10-CM

## 2018-10-01 ENCOUNTER — TELEPHONE (OUTPATIENT)
Dept: BEHAVIORAL/MENTAL HEALTH CLINIC | Facility: CLINIC | Age: 33
End: 2018-10-01

## 2018-10-01 RX ORDER — OMEPRAZOLE 20 MG/1
20 CAPSULE, DELAYED RELEASE ORAL
Qty: 90 CAPSULE | Refills: 1 | Status: SHIPPED | OUTPATIENT
Start: 2018-10-01 | End: 2019-03-22 | Stop reason: SDUPTHER

## 2018-10-01 RX ORDER — ROPINIROLE 4 MG/1
TABLET, FILM COATED ORAL
Qty: 60 TABLET | Refills: 0 | Status: SHIPPED | OUTPATIENT
Start: 2018-10-01 | End: 2018-10-26 | Stop reason: SDUPTHER

## 2018-10-01 NOTE — TELEPHONE ENCOUNTER
I spoke with Elizabeth and gave her info on the [facility] group - women's trauma group   16 weeks - need intake  Call Teresa Leno - [phone]  Mondays 1230-2  Must have intake  Marquita Sotomayor says she will call - is currently in PHP at [facility]

## 2018-10-02 ENCOUNTER — TELEPHONE (OUTPATIENT)
Dept: UROLOGY | Facility: AMBULATORY SURGERY CENTER | Age: 33
End: 2018-10-02

## 2018-10-02 ENCOUNTER — TELEPHONE (OUTPATIENT)
Dept: PAIN MEDICINE | Facility: MEDICAL CENTER | Age: 33
End: 2018-10-02

## 2018-10-02 DIAGNOSIS — G57.02 PIRIFORMIS SYNDROME OF LEFT SIDE: ICD-10-CM

## 2018-10-02 DIAGNOSIS — M76.32 ILIOTIBIAL BAND SYNDROME OF LEFT SIDE: ICD-10-CM

## 2018-10-02 RX ORDER — MELOXICAM 7.5 MG/1
TABLET ORAL
Qty: 60 TABLET | Refills: 0 | Status: SHIPPED | OUTPATIENT
Start: 2018-10-02 | End: 2018-10-23 | Stop reason: SDUPTHER

## 2018-10-03 ENCOUNTER — APPOINTMENT (OUTPATIENT)
Dept: PHYSICAL THERAPY | Facility: REHABILITATION | Age: 33
End: 2018-10-03
Payer: MEDICARE

## 2018-10-03 NOTE — TELEPHONE ENCOUNTER
Called and spoke with patient  Reviewed that our last appt of the day is usually 3 pm   She reports that she could maybe do Thursday if there was a morning appt available  She is willing to come to [facility]  Scheduled 10/11 at 0830 at the [facility] the office location is in the specialty pavilion and the patient verbalized understanding

## 2018-10-11 ENCOUNTER — PROCEDURE VISIT (OUTPATIENT)
Dept: UROLOGY | Facility: CLINIC | Age: 33
End: 2018-10-11
Payer: MEDICARE

## 2018-10-11 VITALS
HEIGHT: 67 IN | SYSTOLIC BLOOD PRESSURE: 122 MMHG | HEART RATE: 88 BPM | DIASTOLIC BLOOD PRESSURE: 80 MMHG | WEIGHT: 293 LBS | BODY MASS INDEX: 45.99 KG/M2

## 2018-10-11 DIAGNOSIS — R39.15 URINARY URGENCY: ICD-10-CM

## 2018-10-11 DIAGNOSIS — R35.0 FREQUENCY OF URINATION: Primary | ICD-10-CM

## 2018-10-11 DIAGNOSIS — N39.41 URGE INCONTINENCE: ICD-10-CM

## 2018-10-11 PROCEDURE — 64566 NEUROELTRD STIM POST TIBIAL: CPT | Performed by: UROLOGY

## 2018-10-11 RX ORDER — MONTELUKAST SODIUM 10 MG/1
10 TABLET ORAL
COMMUNITY
End: 2018-11-29 | Stop reason: SDUPTHER

## 2018-10-11 NOTE — PROGRESS NOTES
10/11/2018    Erendira Senior  1985  [id number]    Diagnosis  Chief Complaint     Urinary Incontinence; Urinary Urgency; Urinary Frequency         Patient presents for PTNS 15 of 24 managed by Dr Seda Cano  Patient will follow up in one month for her next treatment  Urinary Incontinence Screening      Most Recent Value   Urinary Incontinence   Urinary Incontinence? No   Incomplete emptying? No   Urinary frequency? No   Urinary urgency? No   Urinary hesitancy? No   Dysuria (painful difficult urination)? No   Nocturia (waking up to use the bathroom)? Yes [1x]   Straining (having to push to go)? No   Weak stream?  No   Intermittent stream?  No   Post void dribbling? No            Procedure PTNS  Session 15     Ankle used: right  Treatment settin  Duration of treatment: 30 minutes  Lead lot #:[id number]  Expiration Date:2021  Feeling/Response:needle site  Patient Goals and Progress  Decreased Urgency Yes  Decreased Frequency Yes  Episodes of incontinence Yes  Sleep Through Night Yes  Related Health and Social Factors Yes    Overall patient reports she is symptom free with a combination of PTNS and pelvic floor PT  She has successfully weaned off myrbetriq without worsening of symptoms          Marlen Walter, FRANCA SandovalN

## 2018-10-18 NOTE — PROGRESS NOTES
Assessment/Plan:      Diagnoses and all orders for this visit:    Knee crepitus, unspecified laterality  -     Ambulatory referral to Orthopedic Surgery; Future    Iliotibial band syndrome of left side  -     meloxicam (MOBIC) 7 5 mg tablet; Take 1 after breakfast and dinner daily as needed for pain and sciatica  Piriformis syndrome of left side  -     meloxicam (MOBIC) 7 5 mg tablet; Take 1 after breakfast and dinner daily as needed for pain and sciatica  Lumbar degenerative disc disease        Discussion: 28 y o  Female presenting for follow up of LBP  Stating LBP with radiation has resolved  At this point her only concern is crepitus in bilateral knees and requests orthopedic referral  I will provided her with an additional refill of meloxicam as needed for pain, and she is to follow up on an as needed basis  The patient was advised that NSAID-type medications have two very important potential side effects: gastrointestinal irritation including hemorrhage and renal injuries  She was asked to take the medication with food and to stop if she experiences any GI upset  I asked her to call for vomiting, abdominal pain or black/bloody stools  The patient expresses understanding of these issues and questions were answered  Subjective:     Patient ID: Avril Pearce is a 28 y o  female  HPI Last seen 9/4/18 for follow-up with Dr John Wilson  She presents for follow-up of LBP  Has not completed PTx session since 9/11/18  Was in behavorial health program for 10 days and feels at this time has too much going on personally to continue physical therapy  Did find moderate benefit from therapy  Low back feels good today  Restarted work at  yesterday and she is a little sore but overall pain has resolved  Mobic is providing her relief  Takes only if she knows she is going to be busy  Denies N/T, saddle anesthesia, or weakness in LE  Radiation of pain into left thigh has resolved       Is concerned that both knees make a crunching/grinding sound  Not painful, no N/T weakness or buckling in her knees  She notices with walking and going up or down steps  Would like to be evaluated by orthopedic for this issue       PAST MEDICAL HISTORY  Past Medical History:   Diagnosis Date    Anorexia nervosa in remission     Anxiety     Back pain     [facility]     Depression     Esophageal reflux 8/15/2013    Hypertension     Hypothyroid     Idiopathic intracranial hypertension     Lumbar degenerative disc disease 5/8/2014    Obesity     Obsessive-compulsive disorder     Overactive bladder     Psychiatric disorder     Restless leg syndrome, controlled     Seasonal allergies     Seizure ([facility] )     Suicide and self-inflicted injury ([facility] )      PAST SURGICAL HISTORY  Past Surgical History:   Procedure Laterality Date    DENTAL SURGERY      SINUS ENDOSCOPY      TONSILLECTOMY         FAMILY HISTORY  Family History   Problem Relation Age of Onset    Depression Mother     Suicide Attempts Mother     Hypertension Mother     Diabetes Mother     Other Mother         bicuspid aortic valve    Hypertension Father     Hypothyroidism Father     Mental illness Father     Thyroid disease Father     Hypertension Brother     Cancer Maternal Grandmother     Heart disease Maternal Grandmother     Stroke Maternal Grandmother     Breast cancer Maternal Grandmother     Skin cancer Maternal Grandmother     Pneumonia Maternal Grandfather     Cancer Maternal Grandfather     Cancer Paternal Grandmother     Pneumonia Paternal Grandfather     Arthritis Family     Breast cancer Family     Osteopenia Family     Osteoporosis Family     Hypertension Family     Transient ischemic attack Family      HOME MEDICATIONS  Current Outpatient Prescriptions   Medication Sig Dispense Refill    buPROPion (WELLBUTRIN XL) 300 mg 24 hr tablet       Cholecalciferol (VITAMIN D3) 5000 units CAPS Take 5,000 Units by mouth daily      clomiPRAMINE (ANAFRANIL) 50 mg capsule Take 100 mg by mouth 2 (two) times a day      gabapentin (NEURONTIN) 600 MG tablet TAKE 2 TABLETS BY MOUTH AT BEDTIME 60 tablet 2    HYDROcodone-acetaminophen (NORCO) 5-325 mg per tablet Take 1 tablet by mouth 2 (two) times a day as needed for pain (severe pain)      LORazepam (ATIVAN) 1 mg tablet Take 1 mg by mouth 3 (three) times a day as needed for anxiety      lurasidone (LATUDA) 80 mg tablet Take 20 mg by mouth daily with breakfast      meloxicam (MOBIC) 7 5 mg tablet Take 1 after breakfast and dinner daily as needed for pain and sciatica  60 tablet 0    montelukast (SINGULAIR) 10 mg tablet Take 10 mg by mouth daily at bedtime      Multiple Vitamin (MULTIVITAMIN) capsule Take 1 capsule by mouth daily      omeprazole (PriLOSEC) 20 mg delayed release capsule TAKE 1 CAPSULE (20 MG TOTAL) BY MOUTH DAILY IN THE EARLY MORNING FOR 30 DAYS 90 capsule 1    RESTASIS 0 05 % ophthalmic emulsion PLACE 1 DROP INTO BOTH EYES TWICE A DAY  3    rOPINIRole (REQUIP) 4 mg tablet TAKE 1 TABLET BY MOUTH EVERY 12 HOURS FOR 60 DAYS TAKE IN THE MORNING AND AT BEDTIME 60 tablet 0    sertraline (ZOLOFT) 100 mg tablet Take 200 mg by mouth daily  0    lamoTRIgine (LaMICtal) 150 MG tablet Take 2 tablets by mouth daily at bedtime for 30 days (Patient taking differently: Take 100 mg by mouth daily  ) 60 tablet 0    levothyroxine 100 mcg tablet Take 1 tablet (100 mcg total) by mouth daily in the early morning for 30 days 30 tablet 5     No current facility-administered medications for this visit  ALLERGIES  Other      SOCIAL HISTORY  History   Alcohol Use No     History   Drug Use No     History   Smoking Status    Never Smoker   Smokeless Tobacco    Never Used     Review of Systems   Constitutional: Negative for chills, fatigue, fever and unexpected weight change  Respiratory: Negative  Negative for shortness of breath  Cardiovascular: Negative    Negative for chest pain  Gastrointestinal: Negative  Genitourinary: Negative  Musculoskeletal: Positive for gait problem (crepitus bilateral knees)  Negative for arthralgias, back pain and myalgias  Neurological: Positive for headaches  Negative for weakness and numbness  Psychiatric/Behavioral: Positive for sleep disturbance  Objective:  Vitals:    10/23/18 0849   BP: 122/84   Pulse: 82        Physical Exam   Musculoskeletal:        Right knee: She exhibits normal range of motion, no swelling and no deformity  No tenderness found  No patellar tendon tenderness noted  Left knee: She exhibits normal range of motion, no swelling and no effusion  Tenderness found  Patellar tendon tenderness noted  Crepitus bilateral knees   Normal ROM with slight TTP behind left patella    Mild TTP left ITB, no TTP left piriformis    Neurological: She has normal strength  She displays no atrophy  No sensory deficit  She exhibits normal muscle tone  Coordination and gait normal    Reflex Scores:       Tricep reflexes are 2+ on the right side and 2+ on the left side  Bicep reflexes are 2+ on the right side and 2+ on the left side  Brachioradialis reflexes are 2+ on the right side and 2+ on the left side  Patellar reflexes are 2+ on the right side and 2+ on the left side  Achilles reflexes are 2+ on the right side and 2+ on the left side  Psychiatric: Her speech is normal and behavior is normal  Judgment and thought content normal  Her mood appears anxious   Cognition and memory are normal

## 2018-10-23 ENCOUNTER — OFFICE VISIT (OUTPATIENT)
Dept: PAIN MEDICINE | Facility: CLINIC | Age: 33
End: 2018-10-23
Payer: MEDICARE

## 2018-10-23 VITALS
WEIGHT: 293 LBS | HEIGHT: 67 IN | HEART RATE: 82 BPM | SYSTOLIC BLOOD PRESSURE: 122 MMHG | BODY MASS INDEX: 45.99 KG/M2 | DIASTOLIC BLOOD PRESSURE: 84 MMHG

## 2018-10-23 DIAGNOSIS — G57.02 PIRIFORMIS SYNDROME OF LEFT SIDE: ICD-10-CM

## 2018-10-23 DIAGNOSIS — M51.36 LUMBAR DEGENERATIVE DISC DISEASE: ICD-10-CM

## 2018-10-23 DIAGNOSIS — M23.8X9 KNEE CREPITUS, UNSPECIFIED LATERALITY: Primary | ICD-10-CM

## 2018-10-23 DIAGNOSIS — M76.32 ILIOTIBIAL BAND SYNDROME OF LEFT SIDE: ICD-10-CM

## 2018-10-23 PROCEDURE — 99213 OFFICE O/P EST LOW 20 MIN: CPT | Performed by: PHYSICIAN ASSISTANT

## 2018-10-23 RX ORDER — MELOXICAM 7.5 MG/1
TABLET ORAL
Qty: 60 TABLET | Refills: 0 | Status: SHIPPED | OUTPATIENT
Start: 2018-10-23 | End: 2018-12-09 | Stop reason: SDUPTHER

## 2018-10-25 ENCOUNTER — OFFICE VISIT (OUTPATIENT)
Dept: URGENT CARE | Age: 33
End: 2018-10-25
Payer: MEDICARE

## 2018-10-25 VITALS
HEART RATE: 74 BPM | TEMPERATURE: 97.5 F | HEIGHT: 67 IN | WEIGHT: 293 LBS | SYSTOLIC BLOOD PRESSURE: 133 MMHG | DIASTOLIC BLOOD PRESSURE: 86 MMHG | OXYGEN SATURATION: 99 % | BODY MASS INDEX: 45.99 KG/M2

## 2018-10-25 DIAGNOSIS — J01.40 ACUTE NON-RECURRENT PANSINUSITIS: Primary | ICD-10-CM

## 2018-10-25 PROCEDURE — G0463 HOSPITAL OUTPT CLINIC VISIT: HCPCS | Performed by: FAMILY MEDICINE

## 2018-10-25 PROCEDURE — 99213 OFFICE O/P EST LOW 20 MIN: CPT | Performed by: FAMILY MEDICINE

## 2018-10-25 RX ORDER — AMOXICILLIN AND CLAVULANATE POTASSIUM 875; 125 MG/1; MG/1
1 TABLET, FILM COATED ORAL EVERY 12 HOURS SCHEDULED
Qty: 20 TABLET | Refills: 0 | Status: SHIPPED | OUTPATIENT
Start: 2018-10-25 | End: 2018-11-04

## 2018-10-25 NOTE — PATIENT INSTRUCTIONS
Rest, limit activity  Augmentin twice a day until finished (please take probiotics)  Recheck/follow-up with family physician or ENT  Please go to the hospital emergency department if needed

## 2018-10-25 NOTE — LETTER
October 25, 2018     Patient: Sandie Castleman   YOB: 1985   Date of Visit: 10/25/2018       To Whom It May Concern: It is my medical opinion that Lani Javier may return to work on 10/29/2018  If you have any questions or concerns, please don't hesitate to call           Sincerely,        Naima Garcia DO    CC: No Recipients

## 2018-10-25 NOTE — PROGRESS NOTES
[facility]        NAME: Kelli Cordova is a 28 y o  female  : 1985    MRN: [id number]  DATE: 2018  TIME: 7:33 PM    Assessment and Plan   Acute non-recurrent pansinusitis [J01 40]  1  Acute non-recurrent pansinusitis  amoxicillin-clavulanate (AUGMENTIN) 875-125 mg per tablet         Patient Instructions     Patient Instructions   Rest, limit activity  Augmentin twice a day until finished (please take probiotics)  Recheck/follow-up with family physician or ENT  Please go to the hospital emergency department if needed  Follow up with PCP in 3-5 days  Proceed to  ER if symptoms worsen  Chief Complaint     Chief Complaint   Patient presents with    Facial Pain    Nasal Congestion     started 1 wk ago    worsening today    Headache    Fever         History of Present Illness       Fever, congestion, headache, sinus pressure; patient states she had sinus surgery years ago        Review of Systems   Review of Systems   Constitutional: Positive for fever  HENT: Positive for congestion and sinus pressure  Respiratory: Negative  Cardiovascular: Negative  Musculoskeletal: Negative  Skin: Negative  Neurological: Positive for headaches           Current Medications       Current Outpatient Prescriptions:     buPROPion (WELLBUTRIN XL) 300 mg 24 hr tablet, , Disp: , Rfl:     Cholecalciferol (VITAMIN D3) 5000 units CAPS, Take 5,000 Units by mouth daily, Disp: , Rfl:     clomiPRAMINE (ANAFRANIL) 50 mg capsule, Take 100 mg by mouth 2 (two) times a day, Disp: , Rfl:     gabapentin (NEURONTIN) 600 MG tablet, TAKE 2 TABLETS BY MOUTH AT BEDTIME, Disp: 60 tablet, Rfl: 2    HYDROcodone-acetaminophen (NORCO) 5-325 mg per tablet, Take 1 tablet by mouth 2 (two) times a day as needed for pain (severe pain), Disp: , Rfl:     lamoTRIgine (LaMICtal) 150 MG tablet, Take 2 tablets by mouth daily at bedtime for 30 days (Patient taking differently: Take 100 mg by mouth daily  ), Disp: 60 tablet, Rfl: 0    levothyroxine 100 mcg tablet, Take 1 tablet (100 mcg total) by mouth daily in the early morning for 30 days, Disp: 30 tablet, Rfl: 5    LORazepam (ATIVAN) 1 mg tablet, Take 1 mg by mouth 3 (three) times a day as needed for anxiety, Disp: , Rfl:     lurasidone (LATUDA) 80 mg tablet, Take 20 mg by mouth daily with breakfast, Disp: , Rfl:     meloxicam (MOBIC) 7 5 mg tablet, Take 1 after breakfast and dinner daily as needed for pain and sciatica , Disp: 60 tablet, Rfl: 0    montelukast (SINGULAIR) 10 mg tablet, Take 10 mg by mouth daily at bedtime, Disp: , Rfl:     Multiple Vitamin (MULTIVITAMIN) capsule, Take 1 capsule by mouth daily, Disp: , Rfl:     omeprazole (PriLOSEC) 20 mg delayed release capsule, TAKE 1 CAPSULE (20 MG TOTAL) BY MOUTH DAILY IN THE EARLY MORNING FOR 30 DAYS, Disp: 90 capsule, Rfl: 1    RESTASIS 0 05 % ophthalmic emulsion, PLACE 1 DROP INTO BOTH EYES TWICE A DAY, Disp: , Rfl: 3    rOPINIRole (REQUIP) 4 mg tablet, TAKE 1 TABLET BY MOUTH EVERY 12 HOURS FOR 60 DAYS TAKE IN THE MORNING AND AT BEDTIME, Disp: 60 tablet, Rfl: 0    sertraline (ZOLOFT) 100 mg tablet, Take 200 mg by mouth daily, Disp: , Rfl: 0    amoxicillin-clavulanate (AUGMENTIN) 875-125 mg per tablet, Take 1 tablet by mouth every 12 (twelve) hours for 20 doses, Disp: 20 tablet, Rfl: 0    Current Allergies     Allergies as of 10/25/2018 - Reviewed 10/25/2018   Allergen Reaction Noted    Other  02/26/2018            The following portions of the patient's history were reviewed and updated as appropriate: allergies, current medications, past family history, past medical history, past social history, past surgical history and problem list      Past Medical History:   Diagnosis Date    Anorexia nervosa in remission     Anxiety     Back pain     [facility]     Depression     Esophageal reflux 8/15/2013    Hypertension     Hypothyroid     Idiopathic intracranial hypertension     Lumbar degenerative disc disease 5/8/2014    Obesity     Obsessive-compulsive disorder     Overactive bladder     Psychiatric disorder     Restless leg syndrome, controlled     Seasonal allergies     [facility]        Past Surgical History:   Procedure Laterality Date    DENTAL SURGERY      SINUS ENDOSCOPY      TONSILLECTOMY         Family History   Problem Relation Age of Onset    Depression Mother     Suicide Attempts Mother     Hypertension Mother     Diabetes Mother     Other Mother         bicuspid aortic valve    Hypertension Father     Hypothyroidism Father     Mental illness Father     Thyroid disease Father     Hypertension Brother     Cancer Maternal Grandmother     Heart disease Maternal Grandmother     Stroke Maternal Grandmother     Breast cancer Maternal Grandmother     Skin cancer Maternal Grandmother     Pneumonia Maternal Grandfather     Cancer Maternal Grandfather     Cancer Paternal Grandmother     Pneumonia Paternal Grandfather     Arthritis Family     Breast cancer Family     Osteopenia Family     Osteoporosis Family     Hypertension Family     Transient ischemic attack Family          Medications have been verified  Objective   /86   Pulse 74   Temp 97 5 °F (36 4 °C) (Temporal)   Ht 5' 7" (1 702 m)   Wt (!) 174 kg (384 lb)   LMP 09/06/2018 (Approximate)   SpO2 99%   BMI 60 14 kg/m²        Physical Exam     Physical Exam   Constitutional: She is oriented to person, place, and time  She appears well-developed and well-nourished  Patient appears uncomfortable   HENT:   Right Ear: External ear normal    Left Ear: External ear normal    Nasal congestion; tenderness over the sinuses; injection of the oropharynx   Neck: Normal range of motion  Neck supple  Cardiovascular: Normal rate, regular rhythm and normal heart sounds      Pulmonary/Chest: Effort normal and breath sounds normal    Neurological: She is alert and oriented to person, place, and time  No nuchal rigidity   Skin: Skin is warm  Good color and turgor   Psychiatric: She has a normal mood and affect  Her behavior is normal    Nursing note and vitals reviewed

## 2018-10-26 DIAGNOSIS — E03.9 HYPOTHYROIDISM, UNSPECIFIED TYPE: ICD-10-CM

## 2018-10-26 DIAGNOSIS — G25.81 RESTLESS LEG SYNDROME: ICD-10-CM

## 2018-10-26 RX ORDER — ROPINIROLE 4 MG/1
TABLET, FILM COATED ORAL
Qty: 60 TABLET | Refills: 0 | Status: SHIPPED | OUTPATIENT
Start: 2018-10-26 | End: 2018-11-24 | Stop reason: SDUPTHER

## 2018-10-26 RX ORDER — LEVOTHYROXINE SODIUM 0.1 MG/1
100 TABLET ORAL
Qty: 30 TABLET | Refills: 5 | Status: SHIPPED | OUTPATIENT
Start: 2018-10-26 | End: 2019-02-14 | Stop reason: SDUPTHER

## 2018-11-02 ENCOUNTER — APPOINTMENT (OUTPATIENT)
Dept: PHYSICAL THERAPY | Facility: REHABILITATION | Age: 33
End: 2018-11-02
Payer: MEDICARE

## 2018-11-06 ENCOUNTER — OFFICE VISIT (OUTPATIENT)
Dept: FAMILY MEDICINE CLINIC | Facility: CLINIC | Age: 33
End: 2018-11-06
Payer: MEDICARE

## 2018-11-06 VITALS
HEART RATE: 72 BPM | SYSTOLIC BLOOD PRESSURE: 138 MMHG | RESPIRATION RATE: 16 BRPM | TEMPERATURE: 98.2 F | DIASTOLIC BLOOD PRESSURE: 76 MMHG

## 2018-11-06 DIAGNOSIS — J01.00 ACUTE NON-RECURRENT MAXILLARY SINUSITIS: Primary | ICD-10-CM

## 2018-11-06 PROCEDURE — 99213 OFFICE O/P EST LOW 20 MIN: CPT | Performed by: NURSE PRACTITIONER

## 2018-11-06 RX ORDER — LAMOTRIGINE 100 MG/1
50 TABLET ORAL DAILY
COMMUNITY
End: 2018-12-04 | Stop reason: ALTCHOICE

## 2018-11-06 RX ORDER — DOXYCYCLINE 100 MG/1
100 CAPSULE ORAL 2 TIMES DAILY
Qty: 14 CAPSULE | Refills: 0 | Status: SHIPPED | OUTPATIENT
Start: 2018-11-06 | End: 2018-11-13

## 2018-11-07 ENCOUNTER — TELEPHONE (OUTPATIENT)
Dept: FAMILY MEDICINE CLINIC | Facility: CLINIC | Age: 33
End: 2018-11-07

## 2018-11-07 DIAGNOSIS — J01.90 ACUTE NON-RECURRENT SINUSITIS, UNSPECIFIED LOCATION: Primary | ICD-10-CM

## 2018-11-07 RX ORDER — AZITHROMYCIN 250 MG/1
TABLET, FILM COATED ORAL
Qty: 6 TABLET | Refills: 0 | Status: SHIPPED | OUTPATIENT
Start: 2018-11-07 | End: 2018-11-11

## 2018-11-07 NOTE — TELEPHONE ENCOUNTER
Patient is requesting a different medication all together, she does not want to chance the intercranial hypertension, Please advise

## 2018-11-07 NOTE — TELEPHONE ENCOUNTER
Patient called again  Wanted to know what to do  Will she be prescribed another medication? Please advise

## 2018-11-07 NOTE — TELEPHONE ENCOUNTER
Patient called said she was in yesterday to see [facility] was proscribed Doxycycline Monohydrate 100 mg capsule  Said she read one of the side effects of this medication was Intercranial Hypertension and she had this when she was younger  Would like a different medication called in to [address] [facility]  Can be reached at [phone] any questions

## 2018-11-08 ENCOUNTER — TELEPHONE (OUTPATIENT)
Dept: FAMILY MEDICINE CLINIC | Facility: CLINIC | Age: 33
End: 2018-11-08

## 2018-11-08 NOTE — TELEPHONE ENCOUNTER
Patient is requesting a note excusing her from work 11/7&8/2018 and returning on Monday, 11/12/18  Okay for me to do the note?

## 2018-11-12 ENCOUNTER — TELEPHONE (OUTPATIENT)
Dept: FAMILY MEDICINE CLINIC | Facility: CLINIC | Age: 33
End: 2018-11-12

## 2018-11-12 NOTE — TELEPHONE ENCOUNTER
Azithromycin will continue to work over the next few days  She can use nasal sprays OTC (saline nasal spray or Flonase prn), warm gaurav-water gargles, throat lozenges  Increase fluids and rest   Mucinex OTC if she is having trouble producing any phlegm    If no improvement by the end of the week, she should be re-evaluated

## 2018-11-13 ENCOUNTER — PROCEDURE VISIT (OUTPATIENT)
Dept: UROLOGY | Facility: AMBULATORY SURGERY CENTER | Age: 33
End: 2018-11-13
Payer: MEDICARE

## 2018-11-13 ENCOUNTER — OFFICE VISIT (OUTPATIENT)
Dept: OBGYN CLINIC | Facility: HOSPITAL | Age: 33
End: 2018-11-13
Payer: MEDICARE

## 2018-11-13 ENCOUNTER — HOSPITAL ENCOUNTER (OUTPATIENT)
Dept: RADIOLOGY | Facility: HOSPITAL | Age: 33
Discharge: HOME | End: 2018-11-13
Attending: ORTHOPAEDIC SURGERY
Payer: MEDICARE

## 2018-11-13 VITALS
WEIGHT: 293 LBS | SYSTOLIC BLOOD PRESSURE: 130 MMHG | DIASTOLIC BLOOD PRESSURE: 81 MMHG | HEART RATE: 74 BPM | BODY MASS INDEX: 45.99 KG/M2 | HEIGHT: 67 IN

## 2018-11-13 VITALS
HEART RATE: 80 BPM | HEIGHT: 67 IN | SYSTOLIC BLOOD PRESSURE: 132 MMHG | DIASTOLIC BLOOD PRESSURE: 88 MMHG | BODY MASS INDEX: 61.02 KG/M2

## 2018-11-13 DIAGNOSIS — R39.15 URINARY URGENCY: ICD-10-CM

## 2018-11-13 DIAGNOSIS — E66.9 OBESITY (BMI 35.0-39.9 WITHOUT COMORBIDITY): ICD-10-CM

## 2018-11-13 DIAGNOSIS — M25.562 ACUTE PAIN OF BOTH KNEES: Primary | ICD-10-CM

## 2018-11-13 DIAGNOSIS — M25.562 ACUTE PAIN OF BOTH KNEES: ICD-10-CM

## 2018-11-13 DIAGNOSIS — N39.41 URGE INCONTINENCE: ICD-10-CM

## 2018-11-13 DIAGNOSIS — M25.561 ACUTE PAIN OF BOTH KNEES: Primary | ICD-10-CM

## 2018-11-13 DIAGNOSIS — M23.8X9 KNEE CREPITUS, UNSPECIFIED LATERALITY: ICD-10-CM

## 2018-11-13 DIAGNOSIS — M25.561 ACUTE PAIN OF BOTH KNEES: ICD-10-CM

## 2018-11-13 DIAGNOSIS — R35.0 FREQUENCY OF URINATION: ICD-10-CM

## 2018-11-13 PROCEDURE — 64566 NEUROELTRD STIM POST TIBIAL: CPT | Performed by: UROLOGY

## 2018-11-13 PROCEDURE — 99203 OFFICE O/P NEW LOW 30 MIN: CPT | Performed by: ORTHOPAEDIC SURGERY

## 2018-11-13 PROCEDURE — 73562 X-RAY EXAM OF KNEE 3: CPT

## 2018-11-13 NOTE — PROGRESS NOTES
2018    Nain Azar  1985  [id number]    Diagnosis  Chief Complaint     Urinary Incontinence; Urinary Urgency; Urinary Frequency         Patient presents for PTNS 16 of 24 managed by Dr Jeb Hodges  Patient will follow up as scheduled next month for next treatment  Urinary Incontinence Screening      Most Recent Value   Urinary Incontinence   Urinary Incontinence? No   Incomplete emptying? No   Urinary frequency? No   Urinary urgency? No   Urinary hesitancy? No   Dysuria (painful difficult urination)? No   Nocturia (waking up to use the bathroom)? Yes [lately worse due to head cold, 2-3 x]   Straining (having to push to go)? No   Weak stream?  No   Intermittent stream?  No   Post void dribbling? No            Procedure PTNS  Session 16 of 24    Ankle used: right  Treatment settin  Duration of treatment: 30 minutes  Lead lot #:[id number]  Expiration Date:2021  Feeling/Response:toe, foot and site sensation    Patient reports great symptom control currently with a combo of pelvic floor PT exercises and PTNS        ANAHI Contreras BSN

## 2018-11-13 NOTE — PROGRESS NOTES
Assessment/Plan:  Assessment/Plan   Problem List Items Addressed This Visit     None      Visit Diagnoses     Acute pain of both knees    -  Primary    Relevant Orders    XR knee 3 vw right non injury    XR knee 3 vw left non injury    Knee crepitus, unspecified laterality          Bilateral knee crepitus (right > left)  Obesity    · Weight bear as tolerated  · Patient will continue with weight loss through her nutritionist and therapists  · Will contact patient in 3 months for a follow-up  · Discussed plan with patient and she is in agreement with treatment plan    Subjective:   Patient ID: Rodrigo Fisher is a 28 y o  female  HPI    Patient comes in today for evaluation for bilateral knee  Patient states this past summer she notices she was having a cracking sound in her right knee with going, now she has cracking in both knees  Patient states she is not having pain she is just having cracking when going up steps and when extending or bending the knees  Denies any numbness or tingling  The following portions of the patient's history were reviewed and updated as appropriate: allergies, current medications, past family history, past medical history, past social history, past surgical history and problem list     Review of Systems   Constitutional: Negative for chills, fever and unexpected weight change  HENT: Negative for hearing loss, nosebleeds and postnasal drip  Eyes: Negative for pain, redness and visual disturbance  Respiratory: Negative for cough, shortness of breath and wheezing  Cardiovascular: Negative for chest pain, palpitations and leg swelling  Gastrointestinal: Negative for abdominal pain, nausea and vomiting  Endocrine: Negative for polydipsia and polyuria  Genitourinary: Negative for dysuria  Musculoskeletal: Positive for joint swelling and myalgias  Negative for arthralgias  Skin: Negative for rash and wound     Neurological: Negative for dizziness, numbness and headaches  Psychiatric/Behavioral: Negative for decreased concentration and suicidal ideas  The patient is not nervous/anxious          Objective:  Past Medical History:   Diagnosis Date    Anorexia nervosa in remission     Anxiety     Back pain     [facility] disorder ([facility]     Depression     Esophageal reflux 8/15/2013    Hypertension     Hypothyroid     Idiopathic intracranial hypertension     Lumbar degenerative disc disease 5/8/2014    Obesity     Obsessive-compulsive disorder     Overactive bladder     Psychiatric disorder     Restless leg syndrome, controlled     Seasonal allergies     Suicide and self-inflicted injury ([facility] )      Past Surgical History:   Procedure Laterality Date    DENTAL SURGERY      SINUS ENDOSCOPY      TONSILLECTOMY       Family History   Problem Relation Age of Onset    Depression Mother     Suicide Attempts Mother     Hypertension Mother     Diabetes Mother     Other Mother         bicuspid aortic valve    Hypertension Father     Hypothyroidism Father     Mental illness Father     Thyroid disease Father     Hypertension Brother     Cancer Maternal Grandmother     Heart disease Maternal Grandmother     Stroke Maternal Grandmother     Breast cancer Maternal Grandmother     Skin cancer Maternal Grandmother     Pneumonia Maternal Grandfather     Cancer Maternal Grandfather     Cancer Paternal [de-identified]     Pneumonia Paternal Grandfather     Arthritis Family     Breast cancer Family     Osteopenia Family     Osteoporosis Family     Hypertension Family     Transient ischemic attack Family      Social History     Social History    Marital status: Single     Spouse name: N/A    Number of children: N/A    Years of education: N/A     Social History Main Topics    Smoking status: Never Smoker    Smokeless tobacco: Never Used    Alcohol use No    Drug use: No    Sexual activity: Not Currently     Partners: Female     Other Topics Concern    None     Social History Narrative    Always uses seat belts    Caffeine use     Disabled - permament disability since 2008 due to psychiatric illness    Has no children    Single     Tea         Current Outpatient Prescriptions:     buPROPion (WELLBUTRIN XL) 300 mg 24 hr tablet, , Disp: , Rfl:     Cholecalciferol (VITAMIN D3) 5000 units CAPS, Take 5,000 Units by mouth daily, Disp: , Rfl:     clomiPRAMINE (ANAFRANIL) 50 mg capsule, Take 100 mg by mouth 2 (two) times a day, Disp: , Rfl:     doxycycline monohydrate (MONODOX) 100 mg capsule, Take 1 capsule (100 mg total) by mouth 2 (two) times a day for 7 days, Disp: 14 capsule, Rfl: 0    gabapentin (NEURONTIN) 600 MG tablet, TAKE 2 TABLETS BY MOUTH AT BEDTIME (Patient taking differently: 1/2 tablet in morning 1 1/2 tablets at night), Disp: 60 tablet, Rfl: 2    HYDROcodone-acetaminophen (NORCO) 5-325 mg per tablet, Take 1 tablet by mouth 2 (two) times a day as needed for pain (severe pain), Disp: , Rfl:     lamoTRIgine (LaMICtal) 100 mg tablet, Take 50 mg by mouth daily Take 1/2 a pill daily, Disp: , Rfl:     levothyroxine 100 mcg tablet, TAKE 1 TABLET (100 MCG TOTAL) BY MOUTH DAILY IN THE EARLY MORNING FOR 30 DAYS, Disp: 30 tablet, Rfl: 5    LORazepam (ATIVAN) 1 mg tablet, Take 1 mg by mouth 3 (three) times a day as needed for anxiety, Disp: , Rfl:     lurasidone (LATUDA) 80 mg tablet, Take 20 mg by mouth daily with breakfast, Disp: , Rfl:     meloxicam (MOBIC) 7 5 mg tablet, Take 1 after breakfast and dinner daily as needed for pain and sciatica , Disp: 60 tablet, Rfl: 0    montelukast (SINGULAIR) 10 mg tablet, Take 10 mg by mouth daily at bedtime, Disp: , Rfl:     Multiple Vitamin (MULTIVITAMIN) capsule, Take 1 capsule by mouth daily, Disp: , Rfl:     RESTASIS 0 05 % ophthalmic emulsion, PLACE 1 DROP INTO BOTH EYES TWICE A DAY, Disp: , Rfl: 3    rOPINIRole (REQUIP) 4 mg tablet, TAKE 1 TABLET BY MOUTH EVERY 12 HOURS FOR 60 DAYS TAKE IN THE MORNING AND AT BEDTIME, Disp: 60 tablet, Rfl: 0    sertraline (ZOLOFT) 100 mg tablet, Take 200 mg by mouth daily, Disp: , Rfl: 0    omeprazole (PriLOSEC) 20 mg delayed release capsule, TAKE 1 CAPSULE (20 MG TOTAL) BY MOUTH DAILY IN THE EARLY MORNING FOR 30 DAYS, Disp: 90 capsule, Rfl: 1   Allergies   Allergen Reactions    Other      Seasonal allergies        Ortho Exam  Right knee  No lacerations, no operation  No erythema  No effusion  No tenderness to palpation over the lateral or medial joint line  Stable to coronal and sagittal plane stress  Neurovascularly intact distally      Left knee:  No lacerations no abrasions  No erythema  No effusion  No tenderness to palpation over the lateral medial joint line  Stable to varus/valgus stress  Stable to coronal sagittal plane stress  Neurovascularly intact distally    Physical Exam   Constitutional: She is oriented to person, place, and time  She appears well-developed and well-nourished  HENT:   Head: Normocephalic and atraumatic  Eyes: Pupils are equal, round, and reactive to light  Neck: Neck supple  Cardiovascular: Normal rate and regular rhythm  Pulmonary/Chest: Effort normal and breath sounds normal    Neurological: She is alert and oriented to person, place, and time  Skin: Skin is warm and dry  Psychiatric: She has a normal mood and affect  Her behavior is normal  Thought content normal        I have personally reviewed pertinent films in PACS    XR Bilateral knee: no sign of arthritis or fractures    Scribe Attestation    I,:   Ana Boyle am acting as a scribe while in the presence of the attending physician :        I,:   Kishan Stevens MD personally performed the services described in this documentation    as scribed in my presence :

## 2018-11-17 ENCOUNTER — TELEPHONE (OUTPATIENT)
Dept: FAMILY MEDICINE CLINIC | Facility: CLINIC | Age: 33
End: 2018-11-17

## 2018-11-17 ENCOUNTER — OFFICE VISIT (OUTPATIENT)
Dept: URGENT CARE | Facility: CLINIC | Age: 33
End: 2018-11-17
Payer: MEDICARE

## 2018-11-17 VITALS
BODY MASS INDEX: 45.99 KG/M2 | OXYGEN SATURATION: 97 % | WEIGHT: 293 LBS | HEART RATE: 80 BPM | HEIGHT: 67 IN | DIASTOLIC BLOOD PRESSURE: 78 MMHG | TEMPERATURE: 98.7 F | RESPIRATION RATE: 18 BRPM | SYSTOLIC BLOOD PRESSURE: 132 MMHG

## 2018-11-17 DIAGNOSIS — R51.9 SINUS HEADACHE: Primary | ICD-10-CM

## 2018-11-17 DIAGNOSIS — J01.91 ACUTE RECURRENT SINUSITIS, UNSPECIFIED LOCATION: Primary | ICD-10-CM

## 2018-11-17 PROCEDURE — 99213 OFFICE O/P EST LOW 20 MIN: CPT | Performed by: PHYSICIAN ASSISTANT

## 2018-11-17 PROCEDURE — G0463 HOSPITAL OUTPT CLINIC VISIT: HCPCS | Performed by: PHYSICIAN ASSISTANT

## 2018-11-17 RX ORDER — SULFAMETHOXAZOLE AND TRIMETHOPRIM 800; 160 MG/1; MG/1
1 TABLET ORAL EVERY 12 HOURS SCHEDULED
Qty: 20 TABLET | Refills: 0 | Status: SHIPPED | OUTPATIENT
Start: 2018-11-17 | End: 2018-11-29 | Stop reason: ALTCHOICE

## 2018-11-17 RX ORDER — METHYLPREDNISOLONE 4 MG/1
TABLET ORAL
Qty: 21 TABLET | Refills: 0 | Status: SHIPPED | OUTPATIENT
Start: 2018-11-17 | End: 2018-11-29 | Stop reason: ALTCHOICE

## 2018-11-17 NOTE — TELEPHONE ENCOUNTER
Patient notified states steroids doesn't work for her makes her feel anxious request antibiotics  Per Dr Corado Mode needs reevaluation patient to go to urgent care or follow up here on Monday 11/19/18  Informed patient she will go to urgent care

## 2018-11-17 NOTE — PROGRESS NOTES
[facility]        NAME: Alan Alves is a 28 y o  female  : 1985    MRN: [id number]  DATE: 2018  TIME: 2:53 PM    Assessment and Plan   Acute recurrent sinusitis, unspecified location [J01 91]  1  Acute recurrent sinusitis, unspecified location  sulfamethoxazole-trimethoprim (BACTRIM DS) 800-160 mg per tablet         Patient Instructions     Discussed condition with pt  She has persistent/recurrent sinusitis  I will treat her with 10 day course of Bactrim DS and rec hydration, rest, discussed OTC cold meds, and observation  I rec she take probiotic while on abx  Follow up with PCP in 3-5 days  Proceed to  ER if symptoms worsen  Chief Complaint     Chief Complaint   Patient presents with    Nasal Congestion     "Sinus Infection that won't go away "  States cannot take many meds due to "interacts with psych meds"         History of Present Illness       Pt presents with what she reports is a long history of chronic recurrent sinusitis  Had sinus surgery at age 12  Began with HA, sinus pressure about 4 weeks ago  Went to urgent care and was prescribed Augmentin for 10 days  Started feeling better but symptoms did not resolve  She was then put on Doxycycline but was concerned about side effects so stopped after one dose  She was then prescribed a Z-pack by her PCP and finished it but symptoms have not resolved  She is also having ST, PND, slight cough mostly in the AM  She is taking Tylenol but nothing else for symptom relief  She declined oral steroids because she was concerned about not being able to sleep  Does not smoke  She has received the flu shot  Review of Systems   Review of Systems   Constitutional: Negative  HENT: Positive for congestion, postnasal drip and sore throat  Respiratory: Positive for cough  Cardiovascular: Negative  Gastrointestinal: Negative  Genitourinary: Negative  Neurological: Positive for headaches           Current Medications       Current Outpatient Prescriptions:     buPROPion (WELLBUTRIN XL) 300 mg 24 hr tablet, , Disp: , Rfl:     Cholecalciferol (VITAMIN D3) 5000 units CAPS, Take 5,000 Units by mouth daily, Disp: , Rfl:     clomiPRAMINE (ANAFRANIL) 50 mg capsule, Take 100 mg by mouth 2 (two) times a day, Disp: , Rfl:     gabapentin (NEURONTIN) 600 MG tablet, TAKE 2 TABLETS BY MOUTH AT BEDTIME (Patient taking differently: 1/2 tablet in morning 1 1/2 tablets at night), Disp: 60 tablet, Rfl: 2    HYDROcodone-acetaminophen (NORCO) 5-325 mg per tablet, Take 1 tablet by mouth 2 (two) times a day as needed for pain (severe pain), Disp: , Rfl:     lamoTRIgine (LaMICtal) 100 mg tablet, Take 50 mg by mouth daily Take 1/2 a pill daily, Disp: , Rfl:     levothyroxine 100 mcg tablet, TAKE 1 TABLET (100 MCG TOTAL) BY MOUTH DAILY IN THE EARLY MORNING FOR 30 DAYS, Disp: 30 tablet, Rfl: 5    LORazepam (ATIVAN) 1 mg tablet, Take 1 mg by mouth 3 (three) times a day as needed for anxiety, Disp: , Rfl:     lurasidone (LATUDA) 80 mg tablet, Take 20 mg by mouth daily with breakfast, Disp: , Rfl:     meloxicam (MOBIC) 7 5 mg tablet, Take 1 after breakfast and dinner daily as needed for pain and sciatica , Disp: 60 tablet, Rfl: 0    Methylprednisolone 4 MG TBPK, Use as directed on package, Disp: 21 tablet, Rfl: 0    montelukast (SINGULAIR) 10 mg tablet, Take 10 mg by mouth daily at bedtime, Disp: , Rfl:     Multiple Vitamin (MULTIVITAMIN) capsule, Take 1 capsule by mouth daily, Disp: , Rfl:     omeprazole (PriLOSEC) 20 mg delayed release capsule, TAKE 1 CAPSULE (20 MG TOTAL) BY MOUTH DAILY IN THE EARLY MORNING FOR 30 DAYS, Disp: 90 capsule, Rfl: 1    RESTASIS 0 05 % ophthalmic emulsion, PLACE 1 DROP INTO BOTH EYES TWICE A DAY, Disp: , Rfl: 3    rOPINIRole (REQUIP) 4 mg tablet, TAKE 1 TABLET BY MOUTH EVERY 12 HOURS FOR 60 DAYS TAKE IN THE MORNING AND AT BEDTIME, Disp: 60 tablet, Rfl: 0    sertraline (ZOLOFT) 100 mg tablet, Take 200 mg by mouth daily, Disp: , Rfl: 0    sulfamethoxazole-trimethoprim (BACTRIM DS) 800-160 mg per tablet, Take 1 tablet by mouth every 12 (twelve) hours for 10 days Take with food  , Disp: 20 tablet, Rfl: 0    Current Allergies     Allergies as of 11/17/2018 - Reviewed 11/17/2018   Allergen Reaction Noted    Other  02/26/2018            The following portions of the patient's history were reviewed and updated as appropriate: allergies, current medications, past family history, past medical history, past social history, past surgical history and problem list      Past Medical History:   Diagnosis Date    Anorexia nervosa in remission     Anxiety     Back pain     Bipolar disorder ([facility] [facility]     Depression     Esophageal reflux 8/15/2013    Hypertension     Hypothyroid     Idiopathic intracranial hypertension     Lumbar degenerative disc disease 5/8/2014    Obesity     Obsessive-compulsive disorder     Overactive bladder     Psychiatric disorder     Restless leg syndrome, controlled     Seasonal allergies     Suicide and [facility] ([facility]        Past Surgical History:   Procedure Laterality Date    DENTAL SURGERY      SINUS ENDOSCOPY      TONSILLECTOMY         Family History   Problem Relation Age of Onset    Depression Mother     Suicide Attempts Mother     Hypertension Mother     Diabetes Mother     Other Mother         bicuspid aortic valve    Hypertension Father     Hypothyroidism Father     Mental illness Father     Thyroid disease Father     Hypertension Brother     Cancer Maternal Grandmother     Heart disease Maternal Grandmother     Stroke Maternal Grandmother     Breast cancer Maternal Grandmother     Skin cancer Maternal Grandmother     Pneumonia Maternal Grandfather     Cancer Maternal Grandfather     Cancer Paternal Grandmother     Pneumonia Paternal Grandfather     Arthritis Family     Breast cancer Family     Osteopenia Family     Osteoporosis Family  Hypertension Family     Transient ischemic attack Family          Medications have been verified  Objective   /78   Pulse 80   Temp 98 7 °F (37 1 °C)   Resp 18   Ht 5' 7" (1 702 m)   Wt (!) 172 kg (380 lb)   SpO2 97%   BMI 59 52 kg/m²        Physical Exam     Physical Exam   Constitutional: She is oriented to person, place, and time  She appears well-developed and well-nourished  No distress  HENT:   Right Ear: Hearing, external ear and ear canal normal    Left Ear: Hearing, external ear and ear canal normal    Nose: Mucosal edema (B/L boggy turbinates) present  Mouth/Throat: Mucous membranes are normal  Posterior oropharyngeal erythema (PND) present  No oropharyngeal exudate  B/L dull TMs   Neck: Neck supple  Cardiovascular: Normal rate, regular rhythm and normal heart sounds  Pulmonary/Chest: Effort normal and breath sounds normal    Lymphadenopathy:     She has no cervical adenopathy  Neurological: She is alert and oriented to person, place, and time  Psychiatric: She has a normal mood and affect  Vitals reviewed

## 2018-11-17 NOTE — TELEPHONE ENCOUNTER
Please call patient  I will send prescription to the pharmacy for Medrol Dosepak to help with headache, nasal congestion  Recommend patient to schedule appointment with Audra Lawson next week to re-evaluate sinus infection

## 2018-11-20 ENCOUNTER — TRANSCRIBE ORDERS (OUTPATIENT)
Dept: ADMINISTRATIVE | Facility: HOSPITAL | Age: 33
End: 2018-11-20

## 2018-11-20 ENCOUNTER — APPOINTMENT (OUTPATIENT)
Dept: PHYSICAL THERAPY | Facility: REHABILITATION | Age: 33
End: 2018-11-20
Payer: MEDICARE

## 2018-11-20 DIAGNOSIS — J32.9 CHRONIC SINUSITIS, UNSPECIFIED LOCATION: Primary | ICD-10-CM

## 2018-11-24 DIAGNOSIS — G25.81 RESTLESS LEG SYNDROME: ICD-10-CM

## 2018-11-24 DIAGNOSIS — T78.40XA ALLERGIC DISORDER, INITIAL ENCOUNTER: ICD-10-CM

## 2018-11-26 ENCOUNTER — APPOINTMENT (OUTPATIENT)
Dept: LAB | Facility: HOSPITAL | Age: 33
End: 2018-11-26
Payer: MEDICARE

## 2018-11-26 DIAGNOSIS — E03.9 ACQUIRED HYPOTHYROIDISM: ICD-10-CM

## 2018-11-26 DIAGNOSIS — Z13.6 SCREENING FOR CARDIOVASCULAR CONDITION: ICD-10-CM

## 2018-11-26 DIAGNOSIS — N92.1 IRREGULAR INTERMENSTRUAL BLEEDING: ICD-10-CM

## 2018-11-26 DIAGNOSIS — I10 ESSENTIAL HYPERTENSION: Chronic | ICD-10-CM

## 2018-11-26 LAB
ALBUMIN SERPL BCP-MCNC: 3.4 G/DL (ref 3.5–5)
ALP SERPL-CCNC: 107 U/L (ref 46–116)
ALT SERPL W P-5'-P-CCNC: 33 U/L (ref 12–78)
ANION GAP SERPL CALCULATED.3IONS-SCNC: 5 MMOL/L (ref 4–13)
AST SERPL W P-5'-P-CCNC: 21 U/L (ref 5–45)
BILIRUB SERPL-MCNC: 0.24 MG/DL (ref 0.2–1)
BUN SERPL-MCNC: 13 MG/DL (ref 5–25)
CALCIUM SERPL-MCNC: 9.5 MG/DL (ref 8.3–10.1)
CHLORIDE SERPL-SCNC: 105 MMOL/L (ref 100–108)
CHOLEST SERPL-MCNC: 172 MG/DL (ref 50–200)
CO2 SERPL-SCNC: 24 MMOL/L (ref 21–32)
CREAT SERPL-MCNC: 0.69 MG/DL (ref 0.6–1.3)
GFR SERPL CREATININE-BSD FRML MDRD: 114 ML/MIN/1.73SQ M
GLUCOSE P FAST SERPL-MCNC: 105 MG/DL (ref 65–99)
HDLC SERPL-MCNC: 60 MG/DL (ref 40–60)
LDLC SERPL CALC-MCNC: 91 MG/DL (ref 0–100)
NONHDLC SERPL-MCNC: 112 MG/DL
POTASSIUM SERPL-SCNC: 4.2 MMOL/L (ref 3.5–5.3)
PROLACTIN SERPL-MCNC: 10 NG/ML
PROT SERPL-MCNC: 7.6 G/DL (ref 6.4–8.2)
SODIUM SERPL-SCNC: 134 MMOL/L (ref 136–145)
TRIGL SERPL-MCNC: 107 MG/DL
TSH SERPL DL<=0.05 MIU/L-ACNC: 1.73 UIU/ML (ref 0.36–3.74)

## 2018-11-26 PROCEDURE — 80061 LIPID PANEL: CPT

## 2018-11-26 PROCEDURE — 84146 ASSAY OF PROLACTIN: CPT

## 2018-11-26 PROCEDURE — 36415 COLL VENOUS BLD VENIPUNCTURE: CPT

## 2018-11-26 PROCEDURE — 83498 ASY HYDROXYPROGESTERONE 17-D: CPT

## 2018-11-26 PROCEDURE — 82627 DEHYDROEPIANDROSTERONE: CPT

## 2018-11-26 PROCEDURE — 80053 COMPREHEN METABOLIC PANEL: CPT

## 2018-11-26 PROCEDURE — 84443 ASSAY THYROID STIM HORMONE: CPT

## 2018-11-26 RX ORDER — MONTELUKAST SODIUM 10 MG/1
10 TABLET ORAL
Qty: 30 TABLET | Refills: 2 | Status: SHIPPED | OUTPATIENT
Start: 2018-11-26 | End: 2019-02-14 | Stop reason: SDUPTHER

## 2018-11-26 RX ORDER — ROPINIROLE 4 MG/1
TABLET, FILM COATED ORAL
Qty: 60 TABLET | Refills: 0 | Status: SHIPPED | OUTPATIENT
Start: 2018-11-26 | End: 2018-12-20 | Stop reason: SDUPTHER

## 2018-11-27 LAB — DHEA-S SERPL-MCNC: 173.7 UG/DL (ref 84.8–378)

## 2018-11-28 LAB — 17OHP SERPL-MCNC: 19 NG/DL

## 2018-11-28 RX ORDER — FLUTICASONE PROPIONATE 50 MCG
1 SPRAY, SUSPENSION (ML) NASAL DAILY
Refills: 1 | COMMUNITY
Start: 2018-11-20 | End: 2019-11-26

## 2018-11-29 ENCOUNTER — OFFICE VISIT (OUTPATIENT)
Dept: FAMILY MEDICINE CLINIC | Facility: CLINIC | Age: 33
End: 2018-11-29
Payer: MEDICARE

## 2018-11-29 VITALS
TEMPERATURE: 98.2 F | HEART RATE: 76 BPM | RESPIRATION RATE: 16 BRPM | HEIGHT: 67 IN | BODY MASS INDEX: 45.99 KG/M2 | DIASTOLIC BLOOD PRESSURE: 76 MMHG | OXYGEN SATURATION: 99 % | WEIGHT: 293 LBS | SYSTOLIC BLOOD PRESSURE: 124 MMHG

## 2018-11-29 DIAGNOSIS — E66.01 MORBID OBESITY (HCC): ICD-10-CM

## 2018-11-29 DIAGNOSIS — K21.9 GASTROESOPHAGEAL REFLUX DISEASE WITHOUT ESOPHAGITIS: ICD-10-CM

## 2018-11-29 DIAGNOSIS — F31.81 BIPOLAR II DISORDER (HCC): ICD-10-CM

## 2018-11-29 DIAGNOSIS — J30.1 SEASONAL ALLERGIC RHINITIS DUE TO POLLEN: ICD-10-CM

## 2018-11-29 DIAGNOSIS — E03.9 ACQUIRED HYPOTHYROIDISM: Primary | ICD-10-CM

## 2018-11-29 DIAGNOSIS — R73.01 IMPAIRED FASTING GLUCOSE: ICD-10-CM

## 2018-11-29 DIAGNOSIS — F42.2 MIXED OBSESSIONAL THOUGHTS AND ACTS: ICD-10-CM

## 2018-11-29 DIAGNOSIS — J32.9 RECURRENT SINUSITIS: ICD-10-CM

## 2018-11-29 DIAGNOSIS — I10 ESSENTIAL HYPERTENSION: Chronic | ICD-10-CM

## 2018-11-29 PROCEDURE — 99214 OFFICE O/P EST MOD 30 MIN: CPT | Performed by: FAMILY MEDICINE

## 2018-11-29 NOTE — PROGRESS NOTES
Chief Complaint   Patient presents with    Follow-up     4 month follow up     Health Maintenance   Topic Date Due    DTaP,Tdap,and Td Vaccines (1 - Tdap) 08/18/2005    PT PLAN OF CARE  09/21/2018    PAP SMEAR  02/26/2021    INFLUENZA VACCINE  Completed     Assessment/Plan:    Hypothyroidism  Continue replacement with Levothyroxine 100 mcg daily  Hypertension  BP is stable  Continue a low-sodium diet  BMI 50 0-59 9, adult ([facility]  Encouraged regular exercise, weight reduction  Esophageal reflux  Symptoms controlled on Omeprazole 20 mg daily  Recommended to avoid fried, fatty foods, caffeine,soda  Recurrent sinusitis  Patient is to schedule CT scan of the sinuses as ordered by ENT  Recommended to use saline sinus rinse  Use Flonase nasal spray daily  Follow-up with ENT  Allergic rhinitis  Continue Singulair 10 mg at bedtime  Use Flonase nasal spray daily  Impaired fasting glucose  Follow a low carb diet, lose weight  OCD / Bipolar disorder - management per psychiatry  Schedule follow-up office visit in 6 months  Check labs prior to next visit  Diagnoses and all orders for this visit:    Acquired hypothyroidism  -     CBC and differential; Future  -     TSH, 3rd generation with Free T4 reflex; Future    Essential hypertension  -     CBC and differential; Future    Mixed obsessional thoughts and acts    Gastroesophageal reflux disease without esophagitis    Morbid obesity ([facility] [address] )  -     Comprehensive metabolic panel; Future    Recurrent sinusitis    Seasonal allergic rhinitis due to pollen    Impaired fasting glucose  -     Comprehensive metabolic panel; Future  -     Hemoglobin A1C; Future          Subjective:      Patient ID: Dina Rai is a 28 y o  female  HPI     Patient is 59-year-old female with HTN, Hypothyroidism,  Morbid obesity, GERD, Vit D deficiency, OCD, Bipolar disorder  She presents for 4 month follow-up office visit        Patient has recurrent sinusitis since 10/18, completed   3 courses of antibiotic therapy  She was treated with Augmentin, Doxycycline, Bactrim  She completed Abx with Bactrim 2 days ago  Still c/o some sinus congestion, mild sore throat,  pressure in right ear, feeling dizzy  Patient was seen by ENT one week ago and was recommended to schedule CT sinuses  She was advised to use Flonase nasal spray  Patient cannot tolerate oral steroids  Oral Prednosone makes her feel anxious, jittery  HTN - pressure is stable off Lisinopril  Lisinopril was discontinued in August 2018 due to low blood pressure readings  Patient denies chest pain, shortness of breath, dizziness  Hypothyroidism -currently taking Levothyroxine 100 mcg daily  Blood work done on November 26, 2018  Glucose 105, TSH 1 730, creatinine 0 69, sodium 134, cholesterol 172, triglycerides 107, HDL 60, LDL 91  OCD/ Bipolar disorder - management per psychiatrist Dr Esperanza Lazaro  Patient is seeing psychiatrist every 2 weeks  Her next appointment on 12/4/18  GERD - symptoms are stable  Patient takes Omeprazole 20 mg daily  Patient does not exercise on a regular basis due to chronic low back pain  Flu shot done in 8/18  The following portions of the patient's history were reviewed and updated as appropriate: allergies, past family history, past medical history, past social history, past surgical history and problem list     Review of Systems   Constitutional: Positive for fatigue  Negative for activity change, appetite change, chills and fever  HENT: Positive for congestion, postnasal drip and sore throat (mild)  Negative for ear discharge, hearing loss, nosebleeds, tinnitus and trouble swallowing  R ear pressure   Eyes: Negative for pain, discharge, redness, itching and visual disturbance  Respiratory: Negative for cough, chest tightness, shortness of breath and wheezing      Cardiovascular: Negative for chest pain, palpitations and leg swelling  Gastrointestinal: Negative for abdominal pain, blood in stool, constipation, diarrhea, nausea and vomiting  Genitourinary: Negative for difficulty urinating, dysuria, flank pain, frequency and hematuria  Musculoskeletal: Positive for back pain (chronic back pain)  Negative for joint swelling  Skin: Negative for rash and wound  Neurological: Positive for dizziness and headaches  Negative for syncope  Hematological: Negative  Psychiatric/Behavioral: Positive for sleep disturbance  Negative for agitation and suicidal ideas  Depression/ Anxiety          Objective:      /76 (BP Location: Left arm, Patient Position: Sitting, Cuff Size: Large)   Pulse 76   Temp 98 2 °F (36 8 °C) (Oral)   Resp 16   Ht 5' 7" (1 702 m)   Wt (!) 176 kg (387 lb)   SpO2 99%   BMI 60 61 kg/m²          Physical Exam   Constitutional: She appears well-developed and well-nourished  Morbidly obese   HENT:   Head: Normocephalic and atraumatic  Right Ear: External ear normal    Left Ear: External ear normal    Nose: Nose normal    Mouth/Throat: Oropharynx is clear and moist    Eyes: Pupils are equal, round, and reactive to light  Conjunctivae are normal    Neck: Normal range of motion  Neck supple  Cardiovascular: Normal rate, regular rhythm and normal heart sounds  No murmur heard  No BL LE edema   Pulmonary/Chest: Effort normal and breath sounds normal  She has no wheezes  She has no rales  Abdominal: Soft  Bowel sounds are normal  There is no tenderness  Musculoskeletal: Normal range of motion  She exhibits no edema, tenderness or deformity  Lymphadenopathy:     She has no cervical adenopathy  Skin: Skin is warm and dry  No rash noted  Psychiatric: She has a normal mood and affect  Her behavior is normal    Nursing note and vitals reviewed

## 2018-11-30 NOTE — ASSESSMENT & PLAN NOTE
Patient is to schedule CT scan of the sinuses as ordered by ENT  Recommended to use saline sinus rinse  Use Flonase nasal spray daily  Follow-up with ENT

## 2018-11-30 NOTE — ASSESSMENT & PLAN NOTE
Symptoms controlled on Omeprazole 20 mg daily  Recommended to avoid fried, fatty foods, caffeine,soda

## 2018-12-03 NOTE — PROGRESS NOTES
Assessment/Plan:     Complete left breast US  Complete monthly BSE      Diagnoses and all orders for this visit:    Breast nodule  -     US breast left limited (diagnostic); Future              Subjective:      Patient ID: Dina Rai is a 28 y o  female  Dina Rai is a 28 y o  female who is here today for a problem visit  States that she recently discussed a coworking having inflammatory breast cancer and became anxious and checked her breast 2 days ago  She feels a palpable"pea sized" lump and noticed an area of redness on "the outside" and points to her left breast  Non tender  No nipple discharge  No axilla discomfort or recent breast trauma  The following portions of the patient's history were reviewed and updated as appropriate: allergies, current medications, past family history, past medical history, past social history, past surgical history and problem list     Review of Systems   Constitutional: Negative  Musculoskeletal: Negative  Skin: Negative  Hematological: Negative for adenopathy  Psychiatric/Behavioral: The patient is nervous/anxious  Objective:      /82 (BP Location: Right arm, Patient Position: Sitting)   Pulse 88   Ht 5' 7" (1 702 m)   Wt (!) 177 kg (389 lb 6 4 oz)   LMP 11/15/2018   BMI 60 99 kg/m²          Physical Exam   Constitutional: She is oriented to person, place, and time  She appears well-developed and well-nourished  PE limited by habitus   Neck: Normal range of motion  Genitourinary: No breast swelling, tenderness, discharge or bleeding  Genitourinary Comments: Left breast 2:00 position 5 cm FN < 1 cm nodule, right breast WNL   Musculoskeletal: Normal range of motion  Lymphadenopathy:     She has no cervical adenopathy  Neurological: She is alert and oriented to person, place, and time  Skin: Skin is warm and dry  Psychiatric: She has a normal mood and affect  Nursing note and vitals reviewed

## 2018-12-04 ENCOUNTER — OFFICE VISIT (OUTPATIENT)
Dept: GYNECOLOGY | Facility: CLINIC | Age: 33
End: 2018-12-04
Payer: MEDICARE

## 2018-12-04 ENCOUNTER — LAB REQUISITION (OUTPATIENT)
Dept: LAB | Facility: HOSPITAL | Age: 33
End: 2018-12-04
Payer: MEDICARE

## 2018-12-04 VITALS
DIASTOLIC BLOOD PRESSURE: 82 MMHG | BODY MASS INDEX: 45.99 KG/M2 | HEART RATE: 88 BPM | HEIGHT: 67 IN | WEIGHT: 293 LBS | SYSTOLIC BLOOD PRESSURE: 136 MMHG

## 2018-12-04 DIAGNOSIS — J32.0 CHRONIC MAXILLARY SINUSITIS: ICD-10-CM

## 2018-12-04 DIAGNOSIS — N63.0 BREAST NODULE: Primary | ICD-10-CM

## 2018-12-04 PROCEDURE — 87070 CULTURE OTHR SPECIMN AEROBIC: CPT | Performed by: PHYSICIAN ASSISTANT

## 2018-12-04 PROCEDURE — 87102 FUNGUS ISOLATION CULTURE: CPT | Performed by: PHYSICIAN ASSISTANT

## 2018-12-04 PROCEDURE — 87205 SMEAR GRAM STAIN: CPT | Performed by: PHYSICIAN ASSISTANT

## 2018-12-04 PROCEDURE — 99214 OFFICE O/P EST MOD 30 MIN: CPT | Performed by: NURSE PRACTITIONER

## 2018-12-06 ENCOUNTER — HOSPITAL ENCOUNTER (OUTPATIENT)
Dept: ULTRASOUND IMAGING | Facility: CLINIC | Age: 33
Discharge: HOME | End: 2018-12-06
Payer: MEDICARE

## 2018-12-06 ENCOUNTER — HOSPITAL ENCOUNTER (OUTPATIENT)
Dept: MAMMOGRAPHY | Facility: CLINIC | Age: 33
Discharge: HOME | End: 2018-12-06
Payer: MEDICARE

## 2018-12-06 VITALS — HEIGHT: 67 IN | BODY MASS INDEX: 45.99 KG/M2 | WEIGHT: 293 LBS

## 2018-12-06 DIAGNOSIS — N63.0 BREAST NODULE: ICD-10-CM

## 2018-12-06 PROCEDURE — G0279 TOMOSYNTHESIS, MAMMO: HCPCS

## 2018-12-06 PROCEDURE — 77066 DX MAMMO INCL CAD BI: CPT

## 2018-12-07 LAB
BACTERIA WND AEROBE CULT: ABNORMAL
GRAM STN SPEC: ABNORMAL

## 2018-12-09 DIAGNOSIS — M76.32 ILIOTIBIAL BAND SYNDROME OF LEFT SIDE: ICD-10-CM

## 2018-12-09 DIAGNOSIS — G57.02 PIRIFORMIS SYNDROME OF LEFT SIDE: ICD-10-CM

## 2018-12-10 ENCOUNTER — TELEPHONE (OUTPATIENT)
Dept: GYNECOLOGY | Facility: CLINIC | Age: 33
End: 2018-12-10

## 2018-12-10 RX ORDER — MELOXICAM 7.5 MG/1
TABLET ORAL
Qty: 60 TABLET | Refills: 0 | Status: SHIPPED | OUTPATIENT
Start: 2018-12-10 | End: 2021-09-24 | Stop reason: ALTCHOICE

## 2018-12-10 NOTE — TELEPHONE ENCOUNTER
Patient would like to speak with you RE: mammo results, states when she was given results they told her she was at high risk for breast cancer and she should call for testing but she has questions and is really confused

## 2018-12-11 ENCOUNTER — PROCEDURE VISIT (OUTPATIENT)
Dept: UROLOGY | Facility: AMBULATORY SURGERY CENTER | Age: 33
End: 2018-12-11
Payer: MEDICARE

## 2018-12-11 VITALS
BODY MASS INDEX: 60.93 KG/M2 | SYSTOLIC BLOOD PRESSURE: 138 MMHG | HEIGHT: 67 IN | DIASTOLIC BLOOD PRESSURE: 70 MMHG | HEART RATE: 80 BPM

## 2018-12-11 DIAGNOSIS — N39.41 URGENCY INCONTINENCE: ICD-10-CM

## 2018-12-11 DIAGNOSIS — N32.81 OVERACTIVE BLADDER: ICD-10-CM

## 2018-12-11 PROCEDURE — 64566 NEUROELTRD STIM POST TIBIAL: CPT | Performed by: UROLOGY

## 2018-12-11 NOTE — PROGRESS NOTES
2018    Kobi Brooks  1985  [id number]    Diagnosis  Chief Complaint     Urinary Urgency; Urinary Incontinence; Urinary Frequency         Patient presents for PTNS  managed by Dr Kathleen Single  Patient will follow up in 4 weeks with a bladder diary for next treatment and 6 month FU with provider  Urinary Incontinence Screening      Most Recent Value   Urinary Incontinence   Urinary Incontinence? No   Incomplete emptying? No   Urinary frequency? No   Urinary urgency? No   Urinary hesitancy? No   Dysuria (painful difficult urination)? No   Nocturia (waking up to use the bathroom)? Yes [2x better]   Straining (having to push to go)? No   Weak stream?  No   Intermittent stream?  No   Post void dribbling? No            Procedure PTNS  Session 17 of 24    Ankle used: right  Treatment settin  Duration of treatment: 30 minutes  Lead lot #:[id number]  Expiration Date:2021  Feeling/Response: site and sole of the foot sensation    Patient Goals and Progress  Decreased Urgency Yes  Decreased Frequency Yes  Episodes of incontinence Yes  Sleep Through Night No    Overall patient very please with symptom control with combined PTNS and pelvic floor physical therapy         Pia Dooms, RN Daved Cockayne, BSN

## 2018-12-13 DIAGNOSIS — G25.81 RESTLESS LEG SYNDROME: ICD-10-CM

## 2018-12-13 RX ORDER — GABAPENTIN 600 MG/1
TABLET ORAL
Qty: 60 TABLET | Refills: 2 | Status: SHIPPED | OUTPATIENT
Start: 2018-12-13 | End: 2019-04-02 | Stop reason: SDUPTHER

## 2018-12-14 ENCOUNTER — HOSPITAL ENCOUNTER (OUTPATIENT)
Dept: RADIOLOGY | Age: 33
Discharge: HOME | End: 2018-12-14
Payer: MEDICARE

## 2018-12-14 DIAGNOSIS — J32.9 CHRONIC SINUSITIS, UNSPECIFIED LOCATION: ICD-10-CM

## 2018-12-14 PROCEDURE — 70486 CT MAXILLOFACIAL W/O DYE: CPT

## 2018-12-20 DIAGNOSIS — G25.81 RESTLESS LEG SYNDROME: ICD-10-CM

## 2018-12-20 RX ORDER — ROPINIROLE 4 MG/1
TABLET, FILM COATED ORAL
Qty: 60 TABLET | Refills: 0 | OUTPATIENT
Start: 2018-12-20

## 2018-12-31 ENCOUNTER — TELEPHONE (OUTPATIENT)
Dept: FAMILY MEDICINE CLINIC | Facility: CLINIC | Age: 33
End: 2018-12-31

## 2019-01-02 ENCOUNTER — OFFICE VISIT (OUTPATIENT)
Dept: FAMILY MEDICINE CLINIC | Facility: CLINIC | Age: 34
End: 2019-01-02
Payer: MEDICARE

## 2019-01-02 VITALS
HEIGHT: 67 IN | DIASTOLIC BLOOD PRESSURE: 66 MMHG | TEMPERATURE: 98.9 F | HEART RATE: 80 BPM | SYSTOLIC BLOOD PRESSURE: 128 MMHG | WEIGHT: 293 LBS | RESPIRATION RATE: 16 BRPM | BODY MASS INDEX: 45.99 KG/M2

## 2019-01-02 DIAGNOSIS — J32.9 RECURRENT SINUSITIS: Primary | ICD-10-CM

## 2019-01-02 PROCEDURE — 99213 OFFICE O/P EST LOW 20 MIN: CPT | Performed by: NURSE PRACTITIONER

## 2019-01-02 RX ORDER — LEVOFLOXACIN 750 MG/1
750 TABLET ORAL EVERY 24 HOURS
Qty: 7 TABLET | Refills: 0 | Status: SHIPPED | OUTPATIENT
Start: 2019-01-02 | End: 2019-01-09

## 2019-01-02 NOTE — PROGRESS NOTES
Chief Complaint   Patient presents with    Sinus Problem     visual changes in left      Assessment/Plan:    Recurrent sinusitis- to start Levaquin  I did ask her to reach out to her ENT specialist with an update  She will be seeing them again later this month on 1/15 or 1/17  Just finished a course of Bactrim around 12/7/18  She'll continue her sinus rinses and steroid nasal sprays per ENT  Increase fluids and rest   Warm, salt-water gargles, throat lozenges  Call office if symptoms worsen or persist      Diagnoses and all orders for this visit:    Recurrent sinusitis  -     levofloxacin (LEVAQUIN) 750 mg tablet; Take 1 tablet (750 mg total) by mouth every 24 hours for 7 days          Subjective:      Patient ID: Mu Sorto is a 28 y o  female  HPI    Pt presents by herself today for an acute visit  Pt with recurrent sinusitis   Last treated in mid November and then early December (last treated with Bactrim)  C/o sinus pressure for the past 3 days  Has been doing sinus rinses with no relief   +nasal congestion   +sore throat  +sinus headache  +dry cough   She had one episode of leaning forward and then feeling somewhat dizzy with decreased vision earlier this week- episode lasted about 3-5 seconds and resolved spontaneously    No fevers or chills  No body aches   No SOB or wheezing  No N/V/D, appetite is normal     Was evaluated by [facility] back in November  Will be seeing ENT again on 1/15 or 1/17/19  CT of this sinuses performed on 12/14/18 which showed prior sinus surgery with minimal sinus disease     The following portions of the patient's history were reviewed and updated as appropriate: allergies, current medications, past medical history, past social history and problem list     Review of Systems   Constitutional: Positive for fatigue  Negative for activity change, appetite change, chills, diaphoresis, fever and unexpected weight change     HENT: Positive for congestion, postnasal drip, rhinorrhea, sinus pain, sinus pressure and sore throat  Negative for ear discharge, ear pain, hearing loss, sneezing, tinnitus, trouble swallowing and voice change  Eyes: Negative for discharge, itching and visual disturbance  Respiratory: Positive for cough  Negative for chest tightness, shortness of breath and wheezing  Cardiovascular: Negative for chest pain, palpitations and leg swelling  Gastrointestinal: Negative for abdominal pain, constipation, diarrhea and nausea  Genitourinary: Negative for decreased urine volume and dysuria  Musculoskeletal: Negative for arthralgias and myalgias  Skin: Negative for color change, pallor, rash and wound  Neurological: Positive for headaches  Negative for dizziness and weakness  Hematological: Negative for adenopathy  Objective:      /66   Pulse 80   Temp 98 9 °F (37 2 °C) (Oral)   Resp 16   Ht 5' 7" (1 702 m)   Wt (!) 176 kg (389 lb)   BMI 60 93 kg/m²          Physical Exam   Constitutional: She is oriented to person, place, and time  She appears well-developed and well-nourished  No distress  HENT:   Head: Normocephalic and atraumatic  Right Ear: Hearing, tympanic membrane, external ear and ear canal normal    Left Ear: Hearing, tympanic membrane, external ear and ear canal normal    Nose: Mucosal edema (erythematous) present  Right sinus exhibits maxillary sinus tenderness and frontal sinus tenderness  Left sinus exhibits maxillary sinus tenderness and frontal sinus tenderness  Mouth/Throat: Mucous membranes are normal  Posterior oropharyngeal erythema present  No oropharyngeal exudate  Eyes: Pupils are equal, round, and reactive to light  Conjunctivae and EOM are normal    Neck: Normal range of motion  Neck supple  Pulmonary/Chest: Effort normal  No respiratory distress  She has no wheezes  She has no rales  She exhibits no tenderness  Abdominal: Soft  Bowel sounds are normal  She exhibits no distension   There is no tenderness  Musculoskeletal: Normal range of motion  Lymphadenopathy:     She has no cervical adenopathy  Neurological: She is alert and oriented to person, place, and time  Skin: Skin is warm and dry  No rash noted  She is not diaphoretic  No erythema  Psychiatric: She has a normal mood and affect

## 2019-01-02 NOTE — LETTER
January 2, 2019     Patient: Anushka Ron   YOB: 1985   Date of Visit: 1/2/2019       To Whom it May Concern:    Oswaldo Jerez is under my professional care  She was seen in my office on 1/2/2019  She may return to work on 1/4/19  If you have any questions or concerns, please don't hesitate to call           Sincerely,          LEIGHA Dc        CC: No Recipients

## 2019-01-03 ENCOUNTER — EVALUATION (OUTPATIENT)
Dept: PHYSICAL THERAPY | Facility: REHABILITATION | Age: 34
End: 2019-01-03
Payer: MEDICARE

## 2019-01-03 DIAGNOSIS — M62.89 PELVIC FLOOR DYSFUNCTION: Primary | ICD-10-CM

## 2019-01-03 PROCEDURE — G8990 OTHER PT/OT CURRENT STATUS: HCPCS | Performed by: PHYSICAL THERAPIST

## 2019-01-03 PROCEDURE — 97530 THERAPEUTIC ACTIVITIES: CPT | Performed by: PHYSICAL THERAPIST

## 2019-01-03 PROCEDURE — 97112 NEUROMUSCULAR REEDUCATION: CPT | Performed by: PHYSICAL THERAPIST

## 2019-01-03 PROCEDURE — G8992 OTHER PT/OT  D/C STATUS: HCPCS | Performed by: PHYSICAL THERAPIST

## 2019-01-03 PROCEDURE — G8991 OTHER PT/OT GOAL STATUS: HCPCS | Performed by: PHYSICAL THERAPIST

## 2019-01-07 LAB — FUNGUS SPEC CULT: NORMAL

## 2019-01-07 NOTE — PROGRESS NOTES
1/8/2019    Navi Urias  1985  [id number]        Assessment  -OAB  -Mixed stress and urge urinary incontinence    Discussion/Plan  Betsy Perez is a 28 y o  female being managed by Dr Sujey Bauer       -Patient receiving PTNS treatment 18 of 24 in office today  She will completely monthly PTNS treatments to finish 24 month session  Encouraged patient to continue practicing kegel exercises, and attending pelvic floor physical therapy  We discussed that come the end of 24 month PTNS, we will re-evaluate her urinary symptoms and long term management if warranted    -Next PTNS scheduled on 2/5/19  Patient instructed to call with any issues  -All questions answered, patients agree with plan     History of Present Illness  28 y o  female with a history of OAB presents today for follow up  Patient currently receiving PTNS treatment 18 of 24  She reports improvement in urinary frequency and nocturia  Patient completed pelvic floor physical therapy in June 2018  She recently had additional session 1 week ago  Patient reports significant improvement since starting PTNS treatments  She no longer takes any medications for urinary symptoms  Upon initiating treatments patient had reported over 17 episodes of urinary urgency and incontinence  Patient's primary symptom was urinary stress incontinence  Her bladder diary today reports only 3 episodes, and she no longer experiences urinary incontinence  She denies any gross hematuria or dysuria  No overall changes to health since last office visit  Review of Systems  Review of Systems   Constitutional: Negative  HENT: Negative  Respiratory: Negative  Cardiovascular: Negative  Gastrointestinal: Negative  Genitourinary: Negative for decreased urine volume, difficulty urinating, dysuria, flank pain and hematuria  Frequency: occasional  Urgency: occasional    Musculoskeletal: Negative  Skin: Negative  Neurological: Negative  Psychiatric/Behavioral: Negative  Past Medical History  Past Medical History:   Diagnosis Date    Anorexia nervosa in remission     Anxiety     Back pain     [facility]     Depression     Esophageal reflux 8/15/2013    Hypertension     Hypothyroid     Idiopathic intracranial hypertension     Lumbar degenerative disc disease 5/8/2014    Obesity     Obsessive-compulsive disorder     Overactive bladder     Psychiatric disorder     Restless leg syndrome, controlled     Seasonal allergies     Suicide and [facility]ed [facility] ([facility] )        Past Social History  Past Surgical History:   Procedure Laterality Date    DENTAL SURGERY      SINUS ENDOSCOPY      TONSILLECTOMY         Past Family History  Family History   Problem Relation Age of Onset    Depression Mother     Suicide Attempts Mother     Hypertension Mother     Diabetes Mother     Other Mother         bicuspid aortic valve    Hypertension Father     Hypothyroidism Father     Mental illness Father     Thyroid disease Father     Hypertension Brother     Cancer Maternal Grandmother     Heart disease Maternal Grandmother     Stroke Maternal Grandmother     Breast cancer Maternal Grandmother     Skin cancer Maternal Grandmother     Pneumonia Maternal Grandfather     Cancer Maternal Grandfather     Cancer Paternal [de-identified]     Pneumonia Paternal Grandfather     Arthritis Family     Breast cancer Family     Osteopenia Family     Osteoporosis Family     Hypertension Family     Transient ischemic attack Family        Past Social history  Social History     Social History    Marital status: Single     Spouse name: N/A    Number of children: N/A    Years of education: N/A     Occupational History    Not on file       Social History Main Topics    Smoking status: Never Smoker    Smokeless tobacco: Never Used    Alcohol use No    Drug use: No    Sexual activity: Not Currently     Partners: Female Other Topics Concern    Not on file     Social History Narrative    Always uses seat belts    Caffeine use     Disabled - permament disability since 2008 due to psychiatric illness    Has no children    Single     Tea           Current Medications  Current Outpatient Prescriptions   Medication Sig Dispense Refill    buPROPion (WELLBUTRIN XL) 300 mg 24 hr tablet       Cholecalciferol (VITAMIN D3) 5000 units CAPS Take 5,000 Units by mouth daily      clomiPRAMINE (ANAFRANIL) 50 mg capsule Take 100 mg by mouth 2 (two) times a day      fluticasone (FLONASE) 50 mcg/act nasal spray 1 spray daily  1    gabapentin (NEURONTIN) 600 MG tablet TAKE 2 TABLETS BY MOUTH AT BEDTIME 60 tablet 2    HYDROcodone-acetaminophen (NORCO) 5-325 mg per tablet Take 1 tablet by mouth 2 (two) times a day as needed for pain (severe pain)      levofloxacin (LEVAQUIN) 750 mg tablet Take 1 tablet (750 mg total) by mouth every 24 hours for 7 days 7 tablet 0    levothyroxine 100 mcg tablet TAKE 1 TABLET (100 MCG TOTAL) BY MOUTH DAILY IN THE EARLY MORNING FOR 30 DAYS 30 tablet 5    LORazepam (ATIVAN) 1 mg tablet Take 1 mg by mouth 3 (three) times a day as needed for anxiety      lurasidone (LATUDA) 80 mg tablet Take 20 mg by mouth daily with breakfast      meloxicam (MOBIC) 7 5 mg tablet TAKE 1 AFTER BREAKFAST AND DINNER DAILY AS NEEDED FOR PAIN AND SCIATICA   60 tablet 0    montelukast (SINGULAIR) 10 mg tablet TAKE 1 TABLET (10 MG TOTAL) BY MOUTH DAILY AT BEDTIME 30 tablet 2    Multiple Vitamin (MULTIVITAMIN) capsule Take 1 capsule by mouth daily      omeprazole (PriLOSEC) 20 mg delayed release capsule TAKE 1 CAPSULE (20 MG TOTAL) BY MOUTH DAILY IN THE EARLY MORNING FOR 30 DAYS 90 capsule 1    RESTASIS 0 05 % ophthalmic emulsion PLACE 1 DROP INTO BOTH EYES TWICE A DAY  3    rOPINIRole (REQUIP) 4 mg tablet Take 1 tab  twice daily 60 tablet 5    sertraline (ZOLOFT) 100 mg tablet Take 200 mg by mouth daily  0     No current facility-administered medications for this visit  Allergies  Allergies   Allergen Reactions    Other      Seasonal allergies        Past medical history, social history, family history, medications and allergies were reviewed  Vitals  There were no vitals filed for this visit  Physical Exam  Physical Exam   Constitutional: She is oriented to person, place, and time  She appears well-developed and well-nourished  Obese     HENT:   Head: Normocephalic  Eyes: Pupils are equal, round, and reactive to light  Neck: Normal range of motion  Cardiovascular: Normal rate and regular rhythm  Pulmonary/Chest: Effort normal    Abdominal: Soft  Normal appearance  She exhibits no distension  There is no tenderness  There is no CVA tenderness  Musculoskeletal: Normal range of motion  Neurological: She is alert and oriented to person, place, and time  Skin: Skin is warm and dry  Psychiatric: She has a normal mood and affect  Her behavior is normal  Judgment and thought content normal        Results    I have personally reviewed all pertinent lab results and reviewed with patient  Lab Results   Component Value Date    GLUCOSE 89 10/20/2015    CALCIUM 9 5 11/26/2018     10/20/2015    K 4 2 11/26/2018    CO2 24 11/26/2018     11/26/2018    BUN 13 11/26/2018    CREATININE 0 69 11/26/2018     Lab Results   Component Value Date    WBC 10 35 (H) 07/07/2018    HGB 12 4 07/07/2018    HCT 39 5 07/07/2018    MCV 86 07/07/2018     07/07/2018     No results found for this or any previous visit (from the past 1 hour(s))

## 2019-01-08 ENCOUNTER — PROCEDURE VISIT (OUTPATIENT)
Dept: UROLOGY | Facility: AMBULATORY SURGERY CENTER | Age: 34
End: 2019-01-08
Payer: MEDICARE

## 2019-01-08 ENCOUNTER — TELEPHONE (OUTPATIENT)
Dept: UROLOGY | Facility: AMBULATORY SURGERY CENTER | Age: 34
End: 2019-01-08

## 2019-01-08 VITALS
HEART RATE: 88 BPM | SYSTOLIC BLOOD PRESSURE: 122 MMHG | BODY MASS INDEX: 60.93 KG/M2 | HEIGHT: 67 IN | DIASTOLIC BLOOD PRESSURE: 88 MMHG

## 2019-01-08 DIAGNOSIS — R39.15 URINARY URGENCY: ICD-10-CM

## 2019-01-08 DIAGNOSIS — N39.41 URGE INCONTINENCE: ICD-10-CM

## 2019-01-08 DIAGNOSIS — R35.0 FREQUENCY OF URINATION: ICD-10-CM

## 2019-01-08 PROCEDURE — 99213 OFFICE O/P EST LOW 20 MIN: CPT | Performed by: NURSE PRACTITIONER

## 2019-01-08 PROCEDURE — 64566 NEUROELTRD STIM POST TIBIAL: CPT

## 2019-01-08 NOTE — TELEPHONE ENCOUNTER
Patient wanted to come 15 minutes early not 15 minutes late and arrived to the office and was seen accordingly

## 2019-01-08 NOTE — PROGRESS NOTES
2019    [facility]  1985  [id number]    Diagnosis  Chief Complaint     Urinary Urgency; Urinary Incontinence; Urinary Frequency         Patient presents for PTNS  managed by Dr Viji Garrido  Patient will follow up next month as scheduled  Urinary Incontinence Screening      Most Recent Value   Urinary Incontinence   Urinary Incontinence? No   Incomplete emptying? No   Urinary frequency? No   Urinary urgency? No   Urinary hesitancy? No   Dysuria (painful difficult urination)? No   Nocturia (waking up to use the bathroom)? Yes [2x]   Straining (having to push to go)? No   Weak stream?  No   Intermittent stream?  No   Post void dribbling?   No            Procedure PTNS  Session     Ankle used: right  Treatment settin  Duration of treatment: 30 minutes  Lead lot #:[id number]  Expiration Date:2021  Feeling/Response:ankle sensation and arch sensation    Patient Goals and Progress  Decreased Urgency Yes  Decreased Frequency Yes  Episodes of incontinence Yes  Sleep Through Night No  Related Health and Social Factors Yes    Bladder Diary Summary:  Caffeine cups per day:  Alcohol- number of drinks per day:  Daytime voids:8  Nighttime voids:1-2  Urgency:2-3  Incontinence- episodes per day:none (largest improvement)    ANAHI Kim,BSN

## 2019-01-21 ENCOUNTER — TRANSCRIBE ORDERS (OUTPATIENT)
Dept: ADMINISTRATIVE | Facility: HOSPITAL | Age: 34
End: 2019-01-21

## 2019-01-21 DIAGNOSIS — H47.11 PAPILLEDEMA ASSOCIATED WITH INCREASED INTRACRANIAL PRESSURE: ICD-10-CM

## 2019-01-21 DIAGNOSIS — H53.452: Primary | ICD-10-CM

## 2019-01-22 ENCOUNTER — CONSULT (OUTPATIENT)
Dept: GYNECOLOGIC ONCOLOGY | Facility: CLINIC | Age: 34
End: 2019-01-22
Payer: MEDICARE

## 2019-01-22 VITALS
DIASTOLIC BLOOD PRESSURE: 84 MMHG | RESPIRATION RATE: 16 BRPM | BODY MASS INDEX: 45.99 KG/M2 | WEIGHT: 293 LBS | TEMPERATURE: 98.5 F | SYSTOLIC BLOOD PRESSURE: 140 MMHG | HEART RATE: 77 BPM | HEIGHT: 67 IN

## 2019-01-22 DIAGNOSIS — Z80.9 FAMILY HISTORY OF CANCER: Primary | ICD-10-CM

## 2019-01-22 PROCEDURE — 99202 OFFICE O/P NEW SF 15 MIN: CPT | Performed by: PHYSICIAN ASSISTANT

## 2019-01-22 NOTE — PROGRESS NOTES
Assessment/Plan:    Problem List Items Addressed This Visit        Other    Family history of cancer - Primary     28year old with no personal history of cancer  Family history includes maternal grandmother with breast cancer, maternal uncle with melanoma and paternal grandmother with gastric cancer  Family history reviewed via telephone consultation with Genetic Counselor at [facility]  Based on rarity of melanoma and gastric cancer, multipanel testing deemed appropriate  We discussed risk and benefits of testing  She understands the possible outcomes of testing including no pathogenic mutation, a positive pathogenic mutation and VUS  We also discussed prophylactic procedures/screening in the event a genetic mutation identified, as well as implications for patient's family members if mutation identified  Patient agrees to proceed with testing  Blood sample collected and sent to [facility]  I will call the patient with results in 2-4 weeks  The patient will undergo formal genetic counseling and appropriate referrals will be made in the event of a positive finding  I have spent 30 minutes with patient today in which greater than 50% of this time was spent in counseling/coordination of care regarding genetic testing risks, benefits and potential outcomes  CHIEF COMPLAINT:   Genetic testing consultation      Problem:  Family history of breast cancer and melanoma (maternal) and gastric cancer (paternal)    Previous therapy:  None  Patient ID: Callum Maldonado is a 28 y o  female  who presents to the office to discuss genetic testing  The patient does not have a personal history of cancer  She was referred for consultation by her routine gynecologist  The patient notes a family history of breast cancer and melanoma on her maternal side, and gastric cancer on her paternal side  She does note limited history available to her on her father's side  She is without acute complaints  Review of Systems   Unable to perform ROS: Other       Current Outpatient Prescriptions   Medication Sig Dispense Refill    buPROPion (WELLBUTRIN XL) 300 mg 24 hr tablet       Cholecalciferol (VITAMIN D3) 5000 units CAPS Take 5,000 Units by mouth daily      clomiPRAMINE (ANAFRANIL) 50 mg capsule Take 100 mg by mouth 2 (two) times a day      fluticasone (FLONASE) 50 mcg/act nasal spray 1 spray daily  1    gabapentin (NEURONTIN) 600 MG tablet TAKE 2 TABLETS BY MOUTH AT BEDTIME 60 tablet 2    HYDROcodone-acetaminophen (NORCO) 5-325 mg per tablet Take 1 tablet by mouth 2 (two) times a day as needed for pain (severe pain)      LORazepam (ATIVAN) 1 mg tablet Take 1 mg by mouth 3 (three) times a day as needed for anxiety      lurasidone (LATUDA) 80 mg tablet Take 20 mg by mouth daily with breakfast      meloxicam (MOBIC) 7 5 mg tablet TAKE 1 AFTER BREAKFAST AND DINNER DAILY AS NEEDED FOR PAIN AND SCIATICA  60 tablet 0    montelukast (SINGULAIR) 10 mg tablet TAKE 1 TABLET (10 MG TOTAL) BY MOUTH DAILY AT BEDTIME 30 tablet 2    Multiple Vitamin (MULTIVITAMIN) capsule Take 1 capsule by mouth daily      RESTASIS 0 05 % ophthalmic emulsion PLACE 1 DROP INTO BOTH EYES TWICE A DAY  3    rOPINIRole (REQUIP) 4 mg tablet Take 1 tab  twice daily 60 tablet 5    sertraline (ZOLOFT) 100 mg tablet Take 200 mg by mouth daily  0    levothyroxine 100 mcg tablet TAKE 1 TABLET (100 MCG TOTAL) BY MOUTH DAILY IN THE EARLY MORNING FOR 30 DAYS 30 tablet 5    omeprazole (PriLOSEC) 20 mg delayed release capsule TAKE 1 CAPSULE (20 MG TOTAL) BY MOUTH DAILY IN THE EARLY MORNING FOR 30 DAYS 90 capsule 1     No current facility-administered medications for this visit          Allergies   Allergen Reactions    Other      Seasonal allergies        Past Medical History:   Diagnosis Date    Anorexia nervosa in remission     Anxiety     Back pain     [facility]     Depression     Esophageal reflux 8/15/2013    Hypertension     Hypothyroid     Idiopathic intracranial hypertension     Lumbar degenerative disc disease 2014    Obesity     Obsessive-compulsive disorder     Overactive bladder     Psychiatric disorder     Restless leg syndrome, controlled     Seasonal allergies     Suicide and [facility]ed [facility] ([facility] )        Past Surgical History:   Procedure Laterality Date    DENTAL SURGERY      SINUS ENDOSCOPY      TONSILLECTOMY         OB History      Para Term  AB Living    0 0 0 0 0 0    SAB TAB Ectopic Multiple Live Births    0 0 0 0 0          Family History   Problem Relation Age of Onset    Depression Mother     Suicide Attempts Mother     Hypertension Mother     Diabetes Mother     Other Mother         bicuspid aortic valve    Hypertension Father     Hypothyroidism Father     Mental illness Father     Thyroid disease Father     Hypertension Brother     Cancer Maternal Grandmother     Heart disease Maternal Grandmother     Stroke Maternal Grandmother     Breast cancer Maternal Grandmother     Skin cancer Maternal Grandmother     Pneumonia Maternal Grandfather     Cancer Maternal Grandfather     Cancer Paternal Grandmother     Pneumonia Paternal Grandfather     Arthritis Family     Breast cancer Family     Osteopenia Family     Osteoporosis Family     Hypertension Family     Transient ischemic attack Family        The following portions of the patient's history were reviewed and updated as appropriate: allergies, past family history, past medical history and problem list       Objective:    Blood pressure 140/84, pulse 77, temperature 98 5 °F (36 9 °C), temperature source Oral, resp  rate 16, height 5' 7" (1 702 m), weight (!) 176 kg (388 lb)  Body mass index is 60 77 kg/m²  Physical Exam   Constitutional: She is oriented to person, place, and time  She appears well-developed and well-nourished     Pulmonary/Chest: Effort normal    Neurological: She is alert and oriented to person, place, and time  Skin: Skin is warm and dry  Psychiatric: She has a normal mood and affect   Her behavior is normal  Judgment and thought content normal

## 2019-01-22 NOTE — LETTER
January 22, 2019     Georgiana Diane [address] [facility]  [address] [place] [address]    Patient: Avril Pearce   YOB: 1985   Date of Visit: 1/22/2019       Dear Dr Harriet Samuels: Thank you for referring Florian Lovett to me for evaluation  Below are my notes for this consultation  If you have questions, please do not hesitate to call me  I look forward to following your patient along with you  Sincerely,        Kristy Barnes PA-C        CC: No Recipients  Kristy Barnes PA-C  1/22/2019 11:42 AM  Sign at close encounter  Assessment/Plan:    Problem List Items Addressed This Visit        Other    Family history of cancer - Primary     28year old with no personal history of cancer  Family history includes maternal grandmother with breast cancer, maternal uncle with melanoma and paternal grandmother with gastric cancer  Family history reviewed via telephone consultation with Genetic Counselor at [facility]  Based on rarity of melanoma and gastric cancer, multipanel testing deemed appropriate  We discussed risk and benefits of testing  She understands the possible outcomes of testing including no pathogenic mutation, a positive pathogenic mutation and VUS  We also discussed prophylactic procedures/screening in the event a genetic mutation identified, as well as implications for patient's family members if mutation identified  Patient agrees to proceed with testing  Blood sample collected and sent to [facility] [place]  I will call the patient with results in 2-4 weeks  The patient will undergo formal genetic counseling and appropriate referrals will be made in the event of a positive finding  I have spent 30 minutes with patient today in which greater than 50% of this time was spent in counseling/coordination of care regarding genetic testing risks, benefits and potential outcomes          CHIEF COMPLAINT:   Genetic testing consultation      Problem:  Family history of breast cancer and melanoma (maternal) and gastric cancer (paternal)    Previous therapy:  None  Patient ID: Sabrina Lerner is a 28 y o  female  who presents to the office to discuss genetic testing  The patient does not have a personal history of cancer  She was referred for consultation by her routine gynecologist  The patient notes a family history of breast cancer and melanoma on her maternal side, and gastric cancer on her paternal side  She does note limited history available to her on her father's side  She is without acute complaints  Review of Systems   Unable to perform ROS: Other       Current Outpatient Prescriptions   Medication Sig Dispense Refill    buPROPion (WELLBUTRIN XL) 300 mg 24 hr tablet       Cholecalciferol (VITAMIN D3) 5000 units CAPS Take 5,000 Units by mouth daily      clomiPRAMINE (ANAFRANIL) 50 mg capsule Take 100 mg by mouth 2 (two) times a day      fluticasone (FLONASE) 50 mcg/act nasal spray 1 spray daily  1    gabapentin (NEURONTIN) 600 MG tablet TAKE 2 TABLETS BY MOUTH AT BEDTIME 60 tablet 2    HYDROcodone-acetaminophen (NORCO) 5-325 mg per tablet Take 1 tablet by mouth 2 (two) times a day as needed for pain (severe pain)      LORazepam (ATIVAN) 1 mg tablet Take 1 mg by mouth 3 (three) times a day as needed for anxiety      lurasidone (LATUDA) 80 mg tablet Take 20 mg by mouth daily with breakfast      meloxicam (MOBIC) 7 5 mg tablet TAKE 1 AFTER BREAKFAST AND DINNER DAILY AS NEEDED FOR PAIN AND SCIATICA   60 tablet 0    montelukast (SINGULAIR) 10 mg tablet TAKE 1 TABLET (10 MG TOTAL) BY MOUTH DAILY AT BEDTIME 30 tablet 2    Multiple Vitamin (MULTIVITAMIN) capsule Take 1 capsule by mouth daily      RESTASIS 0 05 % ophthalmic emulsion PLACE 1 DROP INTO BOTH EYES TWICE A DAY  3    rOPINIRole (REQUIP) 4 mg tablet Take 1 tab  twice daily 60 tablet 5    sertraline (ZOLOFT) 100 mg tablet Take 200 mg by mouth daily  0    levothyroxine 100 mcg tablet TAKE 1 TABLET (100 MCG TOTAL) BY MOUTH DAILY IN THE EARLY MORNING FOR 30 DAYS 30 tablet 5    omeprazole (PriLOSEC) 20 mg delayed release capsule TAKE 1 CAPSULE (20 MG TOTAL) BY MOUTH DAILY IN THE EARLY MORNING FOR 30 DAYS 90 capsule 1     No current facility-administered medications for this visit          Allergies   Allergen Reactions    Other      Seasonal allergies        Past Medical History:   Diagnosis Date    Anorexia nervosa in remission     Anxiety     Back pain     [facility] disorder [facility] )     Depression     Esophageal reflux 8/15/2013    Hypertension     Hypothyroid     Idiopathic intracranial hypertension     Lumbar degenerative disc disease 2014    Obesity     Obsessive-compulsive disorder     Overactive bladder     Psychiatric disorder     Restless leg syndrome, controlled     Seasonal allergies     Suicide and self-inflicted injury ([facility] )        Past Surgical History:   Procedure Laterality Date    DENTAL SURGERY      SINUS ENDOSCOPY      TONSILLECTOMY         OB History      Para Term  AB Living    0 0 0 0 0 0    SAB TAB Ectopic Multiple Live Births    0 0 0 0 0          Family History   Problem Relation Age of Onset    Depression Mother     Suicide Attempts Mother     Hypertension Mother     Diabetes Mother     Other Mother         bicuspid aortic valve    Hypertension Father     Hypothyroidism Father     Mental illness Father     Thyroid disease Father     Hypertension Brother     Cancer Maternal Grandmother     Heart disease Maternal Grandmother     Stroke Maternal Grandmother     Breast cancer Maternal Grandmother     Skin cancer Maternal Grandmother     Pneumonia Maternal Grandfather     Cancer Maternal Grandfather     Cancer Paternal Grandmother     Pneumonia Paternal Grandfather     Arthritis Family     Breast cancer Family     Osteopenia Family     Osteoporosis Family     Hypertension Family     Transient ischemic attack Family The following portions of the patient's history were reviewed and updated as appropriate: allergies, past family history, past medical history and problem list       Objective:    Blood pressure 140/84, pulse 77, temperature 98 5 °F (36 9 °C), temperature source Oral, resp  rate 16, height 5' 7" (1 702 m), weight (!) 176 kg (388 lb)  Body mass index is 60 77 kg/m²  Physical Exam   Constitutional: She is oriented to person, place, and time  She appears well-developed and well-nourished  Pulmonary/Chest: Effort normal    Neurological: She is alert and oriented to person, place, and time  Skin: Skin is warm and dry  Psychiatric: She has a normal mood and affect   Her behavior is normal  Judgment and thought content normal

## 2019-01-24 ENCOUNTER — APPOINTMENT (OUTPATIENT)
Dept: LAB | Facility: HOSPITAL | Age: 34
End: 2019-01-24
Payer: MEDICARE

## 2019-01-24 ENCOUNTER — TRANSCRIBE ORDERS (OUTPATIENT)
Dept: LAB | Facility: HOSPITAL | Age: 34
End: 2019-01-24

## 2019-01-24 DIAGNOSIS — H53.452 NASAL STEP VISUAL FIELD DEFECT OF LEFT EYE: ICD-10-CM

## 2019-01-24 DIAGNOSIS — H47.11 PAPILLEDEMA ASSOCIATED WITH INCREASED INTRACRANIAL PRESSURE: ICD-10-CM

## 2019-01-24 DIAGNOSIS — H47.11 PAPILLEDEMA ASSOCIATED WITH INCREASED INTRACRANIAL PRESSURE: Primary | ICD-10-CM

## 2019-01-24 LAB
ANION GAP SERPL CALCULATED.3IONS-SCNC: 7 MMOL/L (ref 4–13)
BASOPHILS # BLD AUTO: 0.06 THOUSANDS/ΜL (ref 0–0.1)
BASOPHILS NFR BLD AUTO: 1 % (ref 0–1)
BUN SERPL-MCNC: 17 MG/DL (ref 5–25)
CALCIUM SERPL-MCNC: 9 MG/DL (ref 8.3–10.1)
CHLORIDE SERPL-SCNC: 104 MMOL/L (ref 100–108)
CO2 SERPL-SCNC: 25 MMOL/L (ref 21–32)
CREAT SERPL-MCNC: 0.63 MG/DL (ref 0.6–1.3)
CRP SERPL QL: 29 MG/L
EOSINOPHIL # BLD AUTO: 0.19 THOUSAND/ΜL (ref 0–0.61)
EOSINOPHIL NFR BLD AUTO: 2 % (ref 0–6)
ERYTHROCYTE [DISTWIDTH] IN BLOOD BY AUTOMATED COUNT: 14.4 % (ref 11.6–15.1)
ERYTHROCYTE [SEDIMENTATION RATE] IN BLOOD: 25 MM/HOUR (ref 0–20)
GFR SERPL CREATININE-BSD FRML MDRD: 117 ML/MIN/1.73SQ M
GLUCOSE P FAST SERPL-MCNC: 98 MG/DL (ref 65–99)
HCT VFR BLD AUTO: 40.4 % (ref 34.8–46.1)
HGB BLD-MCNC: 12.4 G/DL (ref 11.5–15.4)
IMM GRANULOCYTES # BLD AUTO: 0.03 THOUSAND/UL (ref 0–0.2)
IMM GRANULOCYTES NFR BLD AUTO: 0 % (ref 0–2)
LYMPHOCYTES # BLD AUTO: 2.45 THOUSANDS/ΜL (ref 0.6–4.47)
LYMPHOCYTES NFR BLD AUTO: 25 % (ref 14–44)
MCH RBC QN AUTO: 26.5 PG (ref 26.8–34.3)
MCHC RBC AUTO-ENTMCNC: 30.7 G/DL (ref 31.4–37.4)
MCV RBC AUTO: 86 FL (ref 82–98)
MONOCYTES # BLD AUTO: 0.61 THOUSAND/ΜL (ref 0.17–1.22)
MONOCYTES NFR BLD AUTO: 6 % (ref 4–12)
NEUTROPHILS # BLD AUTO: 6.53 THOUSANDS/ΜL (ref 1.85–7.62)
NEUTS SEG NFR BLD AUTO: 66 % (ref 43–75)
NRBC BLD AUTO-RTO: 0 /100 WBCS
PLATELET # BLD AUTO: 308 THOUSANDS/UL (ref 149–390)
PMV BLD AUTO: 10.6 FL (ref 8.9–12.7)
POTASSIUM SERPL-SCNC: 3.6 MMOL/L (ref 3.5–5.3)
RBC # BLD AUTO: 4.68 MILLION/UL (ref 3.81–5.12)
RPR SER QL: NORMAL
SODIUM SERPL-SCNC: 136 MMOL/L (ref 136–145)
WBC # BLD AUTO: 9.87 THOUSAND/UL (ref 4.31–10.16)

## 2019-01-24 PROCEDURE — 85652 RBC SED RATE AUTOMATED: CPT

## 2019-01-24 PROCEDURE — 86592 SYPHILIS TEST NON-TREP QUAL: CPT

## 2019-01-24 PROCEDURE — 86140 C-REACTIVE PROTEIN: CPT

## 2019-01-24 PROCEDURE — 80048 BASIC METABOLIC PNL TOTAL CA: CPT

## 2019-01-24 PROCEDURE — 86780 TREPONEMA PALLIDUM: CPT

## 2019-01-24 PROCEDURE — 36415 COLL VENOUS BLD VENIPUNCTURE: CPT

## 2019-01-24 PROCEDURE — 85549 MURAMIDASE: CPT

## 2019-01-24 PROCEDURE — 86038 ANTINUCLEAR ANTIBODIES: CPT

## 2019-01-24 PROCEDURE — 85025 COMPLETE CBC W/AUTO DIFF WBC: CPT

## 2019-01-25 LAB
RYE IGE QN: NEGATIVE
T PALLIDUM AB SER QL IF: NON REACTIVE

## 2019-01-28 LAB — LYSOZYME SERPL-MCNC: 5.4 UG/ML (ref 2.5–12.9)

## 2019-01-29 ENCOUNTER — TELEPHONE (OUTPATIENT)
Dept: GYNECOLOGIC ONCOLOGY | Facility: CLINIC | Age: 34
End: 2019-01-29

## 2019-01-29 NOTE — TELEPHONE ENCOUNTER
Patient notified of genetic testing results, LMOM  No mutation identified  Report scanned into media

## 2019-01-31 NOTE — PSYCH
Emerita from [facility] is calling because she states that they need surgical clearance from the PCP.  Please advise.   Risk Assessment    The following ratings are based on my observation of this patient over the last session  Risk of Harm to Self:   Demographic risk factors include , alaskan, or native [place] and [address]  Historical Risk Factors include: chronic psychiatric problems, history of suicidal behaviors/attempts and self-mutilating behaviors  Recent Specific Risk Factors include: experienced fleeting ideation and diagnosis of depression  These risk factors presented within the last month  Risk of Harm to Others:   Based on the above information, the client presents the following risk of harm to self or others: moderate  The following interventions are recommended: consultation with will speak with treating Psychiatrist in 1 hour  Notes regarding this Risk Assessment: Pt is well know to this worker, pt denied intent  thoughts are of ocd type  Assessment    1  Binge-eating disorder, severe (783 6) (F50 81)   2  EILEEN (generalized anxiety disorder) (300 02) (F41 1)   3  Gambling disorder, moderate (312 31) (F63 0)   4  Obsessive-compulsive disorder (300 3) (F42 9)   5  PTSD (post-traumatic stress disorder) (309 81) (F43 10)    Active Problems    1  Abnormal weight gain (783 1) (R63 5)   2  Allergic rhinitis (477 9) (J30 9)   3  Amenorrhea (626 0) (N91 2)   4  Binge-eating disorder, severe (783 6) (F50 81)   5  Bulging lumbar disc (722 10) (M51 26)   6  Dysuria (788 1) (R30 0)   7  Esophageal reflux (530 81) (K21 9)   8  Foot pain (729 5) (M79 673)   9  EILEEN (generalized anxiety disorder) (300 02) (F41 1)   10  Gambling disorder, moderate (312 31) (F63 0)   11  Hypertension (401 9) (I10)   12  Hypothyroidism (244 9) (E03 9)   13  Long term current use of therapeutic drug (V58 69) (Z79 899)   14  Lumbago (724 2) (M54 5)   15  Lumbar degenerative disc disease (722 52) (M51 36)   16  Morbid or severe obesity due to excess calories (278 01) (E66 01)   17   Need for immunization against influenza (V04 81) (Z23)   18  Nocturnal enuresis (788 36) (N39 44)   19  Obsessive-compulsive disorder (300 3) (F42 9)   20  Other abnormal glucose (790 29) (R73 09)   21  Overactive bladder (596 51) (N32 81)   22  PTSD (post-traumatic stress disorder) (309 81) (F43 10)   23  RLS (restless legs syndrome) (333 94) (G25 81)   24  Severe bipolar I disorder, most recent episode depressed (296 53) (F31 4)   25  Urgency incontinence (788 31) (N39 41)   26  Urinary incontinence, unspecified type (788 30) (R32)   27  Urinary tract infection, site unspecified (599 0) (N39 0)   28  Vitamin D deficiency (268 9) (E55 9)    Past Medical History    1  History of Abscess of face (682 0) (L02 01)   2  History of Acute diverticulitis (562 11) (K57 92)   3  History of Acute frontal sinusitis (461 1) (J01 10)   4  History of Acute nonsuppurative otitis media, unspecified laterality (381 00) (H65 199)   5  History of Acute pain (338 19) (R52)   6  History of Anorexia nervosa in remission (307 1) (F50 00)   7  History of Backache (724 5) (M54 9)   8  History of Candidiasis, cutaneous (112 3) (B37 2)   9  History of Dog bite (879 8,E906 0) (W54  0XXA)   10  History of Dysfunction of left eustachian tube (381 81) (H69 82)   11  History of Dysuria (788 1) (R30 0)   12  History of acute bronchitis (V12 69) (Z87 09)   13  History of acute sinusitis (V12 69) (Z87 09)   14  History of bronchitis (V12 69) (Z87 09)   15  History of cough   16  Denied: History of head injury   17  History of Pseudotumor cerebri (348 2) (G93 2)   18  Denied: History of Seizures   19  History of Suicide and self-inflicted injury (K653 0) (N07 8UIS)    Future Appointments    Date/Time Provider Specialty Site   12/27/2017 09:00 AM Cem Dunbar LCSW  [facility]   01/08/2018 01:00 PM B[address]bara Pineda [address] [facility]   01/11/2018 01:45 PM PAUL Dangelo   Urology [facility] [place]   01/19/2018 09:00 AM Yinka Harpreet Ray [facility]   02/09/2018 10:00  [address], [place], [address] [facility]   02/16/2018 10:00  [address], [place], [address] [facility]   02/26/2018 08:45 AM Jeanne Cowden, [address]     Signatures   Electronically signed by : Alessandro Richard LCSW; Dec 19 2017  2:03PM EST                       (Author)

## 2019-02-05 ENCOUNTER — PROCEDURE VISIT (OUTPATIENT)
Dept: UROLOGY | Facility: AMBULATORY SURGERY CENTER | Age: 34
End: 2019-02-05
Payer: MEDICARE

## 2019-02-05 VITALS
HEIGHT: 67 IN | BODY MASS INDEX: 60.77 KG/M2 | DIASTOLIC BLOOD PRESSURE: 70 MMHG | HEART RATE: 80 BPM | SYSTOLIC BLOOD PRESSURE: 116 MMHG

## 2019-02-05 DIAGNOSIS — N39.41 URGE INCONTINENCE: ICD-10-CM

## 2019-02-05 DIAGNOSIS — R39.15 URINARY URGENCY: ICD-10-CM

## 2019-02-05 DIAGNOSIS — R35.0 FREQUENCY OF URINATION: ICD-10-CM

## 2019-02-05 PROCEDURE — 64566 NEUROELTRD STIM POST TIBIAL: CPT

## 2019-02-05 NOTE — PROGRESS NOTES
2/5/2019    Rodrigo Fisher  1985  [id number]    Diagnosis  Chief Complaint     Urinary Urgency; Urinary Incontinence; Urinary Frequency         Patient presents for PTNS 19 of 24 managed by Dr Navin Hicks  Patient will follow up in one month for her next treatment    Urinary Incontinence Screening      Most Recent Value   Urinary Incontinence   Urinary Incontinence? No ["not really"]   Incomplete emptying? No   Urinary frequency? No   Urinary urgency? No   Urinary hesitancy? No   Dysuria (painful difficult urination)? No   Nocturia (waking up to use the bathroom)? Yes [2x]   Straining (having to push to go)? No   Weak stream?  No   Intermittent stream?  No   Post void dribbling? No            Procedure PTNS  Session 19 of 24    Ankle used: right  Treatment setting:15  Duration of treatment: 30 minutes  Lead lot #:[id number]  Expiration Date:05/31/2021  Feeling/Response:needle site sensation    Patient reports maybe slight worsening of symptoms with recent stomach virus, but symptoms are improved with resolution of virus      ANAHI De Jesus BSN

## 2019-02-13 ENCOUNTER — HOSPITAL ENCOUNTER (EMERGENCY)
Facility: HOSPITAL | Age: 34
Discharge: HOME | End: 2019-02-13
Attending: EMERGENCY MEDICINE | Admitting: EMERGENCY MEDICINE
Payer: MEDICARE

## 2019-02-13 ENCOUNTER — APPOINTMENT (EMERGENCY)
Dept: RADIOLOGY | Facility: HOSPITAL | Age: 34
End: 2019-02-13
Payer: MEDICARE

## 2019-02-13 VITALS
HEART RATE: 99 BPM | RESPIRATION RATE: 18 BRPM | DIASTOLIC BLOOD PRESSURE: 64 MMHG | TEMPERATURE: 97.7 F | SYSTOLIC BLOOD PRESSURE: 142 MMHG | OXYGEN SATURATION: 99 %
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
## 2019-02-13 PROCEDURE — 73130 X-RAY EXAM OF HAND: CPT

## 2019-02-13 PROCEDURE — 90715 TDAP VACCINE 7 YRS/> IM: CPT | Performed by: EMERGENCY MEDICINE

## 2019-02-13 PROCEDURE — 99283 EMERGENCY DEPT VISIT LOW MDM: CPT

## 2019-02-13 PROCEDURE — 96372 THER/PROPH/DIAG INJ SC/IM: CPT

## 2019-02-13 PROCEDURE — 90471 IMMUNIZATION ADMIN: CPT

## 2019-02-13 RX ORDER — AMOXICILLIN AND CLAVULANATE POTASSIUM 875; 125 MG/1; MG/1
1 TABLET, FILM COATED ORAL EVERY 12 HOURS
Qty: 14 TABLET | Refills: 0 | Status: SHIPPED | OUTPATIENT
Start: 2019-02-13 | End: 2019-02-20

## 2019-02-13 RX ORDER — KETOROLAC TROMETHAMINE 30 MG/ML
15 INJECTION, SOLUTION INTRAMUSCULAR; INTRAVENOUS ONCE
Status: COMPLETED | OUTPATIENT
Start: 2019-02-13 | End: 2019-02-13

## 2019-02-13 RX ADMIN — TETANUS TOXOID, REDUCED DIPHTHERIA TOXOID AND ACELLULAR PERTUSSIS VACCINE, ADSORBED 0.5 ML: 5; 2.5; 8; 8; 2.5 SUSPENSION INTRAMUSCULAR at 14:38

## 2019-02-13 RX ADMIN — KETOROLAC TROMETHAMINE 15 MG: 30 INJECTION, SOLUTION INTRAMUSCULAR at 14:38

## 2019-02-13 NOTE — ED PROVIDER NOTES
History  Chief Complaint   Patient presents with    Animal Bite     bit by dog at around 1230 hours today  Unknown if dog is up to date on vaccinations  Unknown tetanus vax status of patient  Bite to right hand  Patient is a 69-year-old, right-handed, female who presents with a dog bite to her right hand that occurred about an hour prior to arrival   Patient reports that she lives with her dog and her roommate's dog who is blind  The blind dog spooked her flores retriever and they started nipping at each other, she tried to break them up and 1 of the dogs ended up biting her right hand  Both dogs are immunized against rabies  The patient does not know if her tetanus is up-to-date  Patient complains of pain to her right hand at the puncture wound sites that is a 7/10, constant, throbbing in nature, nonradiating  Prior to Admission Medications   Prescriptions Last Dose Informant Patient Reported? Taking?    Cholecalciferol (VITAMIN D3) 5000 units CAPS  Self Yes Yes   Sig: Take 5,000 Units by mouth daily   HYDROcodone-acetaminophen (NORCO) 5-325 mg per tablet  Self Yes Yes   Sig: Take 1 tablet by mouth 2 (two) times a day as needed for pain (severe pain)   LORazepam (ATIVAN) 1 mg tablet  Self Yes Yes   Sig: Take 1 mg by mouth 3 (three) times a day as needed for anxiety   Multiple Vitamin (MULTIVITAMIN) capsule  Self Yes Yes   Sig: Take 1 capsule by mouth daily   RESTASIS 0 05 % ophthalmic emulsion  Self Yes Yes   Sig: PLACE 1 DROP INTO BOTH EYES TWICE A DAY   buPROPion (WELLBUTRIN XL) 300 mg 24 hr tablet  Self Yes Yes   clomiPRAMINE (ANAFRANIL) 50 mg capsule  Self Yes Yes   Sig: Take 100 mg by mouth 2 (two) times a day   fluticasone (FLONASE) 50 mcg/act nasal spray  Self Yes Yes   Si spray daily   gabapentin (NEURONTIN) 600 MG tablet  Self No Yes   Sig: TAKE 2 TABLETS BY MOUTH AT BEDTIME   levothyroxine 100 mcg tablet  Self No Yes   Sig: TAKE 1 TABLET (100 MCG TOTAL) BY MOUTH DAILY IN THE EARLY MORNING FOR 30 DAYS   lurasidone (LATUDA) 80 mg tablet  Self Yes Yes   Sig: Take 60 mg by mouth daily with breakfast     meloxicam (MOBIC) 7 5 mg tablet  Self No Yes   Sig: TAKE 1 AFTER BREAKFAST AND DINNER DAILY AS NEEDED FOR PAIN AND SCIATICA     montelukast (SINGULAIR) 10 mg tablet  Self No Yes   Sig: TAKE 1 TABLET (10 MG TOTAL) BY MOUTH DAILY AT BEDTIME   omeprazole (PriLOSEC) 20 mg delayed release capsule  Self No Yes   Sig: TAKE 1 CAPSULE (20 MG TOTAL) BY MOUTH DAILY IN THE EARLY MORNING FOR 30 DAYS   rOPINIRole (REQUIP) 4 mg tablet  Self No Yes   Sig: Take 1 tab  twice daily   sertraline (ZOLOFT) 100 mg tablet  Self Yes Yes   Sig: Take 200 mg by mouth daily      Facility-Administered Medications: None       Past Medical History:   Diagnosis Date    Anorexia nervosa in remission     Anxiety     Back pain     Bipolar disorder (HCC)     Depression     Esophageal reflux 8/15/2013    Hypertension     Hypothyroid     Idiopathic intracranial hypertension     Lumbar degenerative disc disease 5/8/2014    Obesity     Obsessive-compulsive disorder     Overactive bladder     Psychiatric disorder     Restless leg syndrome, controlled     Seasonal allergies     Suicide and self-inflicted injury ([facility] )        Past Surgical History:   Procedure Laterality Date    DENTAL SURGERY      SINUS ENDOSCOPY      TONSILLECTOMY         Family History   Problem Relation Age of Onset    Depression Mother     Suicide Attempts Mother     Hypertension Mother     Diabetes Mother     Other Mother         bicuspid aortic valve    Hypertension Father     Hypothyroidism Father     Mental illness Father     Thyroid disease Father     Hypertension Brother     Cancer Maternal Grandmother     Heart disease Maternal Grandmother     Stroke Maternal Grandmother     Breast cancer Maternal Grandmother     Skin cancer Maternal Grandmother     Pneumonia Maternal Grandfather     Cancer Maternal Grandfather     Cancer Paternal Grandmother     Pneumonia Paternal Grandfather     Arthritis Family     Breast cancer Family     Osteopenia Family     Osteoporosis Family     Hypertension Family     Transient ischemic attack Family      I have reviewed and agree with the history as documented  Social History     Tobacco Use    Smoking status: Never Smoker    Smokeless tobacco: Never Used   Substance Use Topics    Alcohol use: No    Drug use: No        Review of Systems   Constitutional: Negative for chills and fever  Gastrointestinal: Negative for nausea and vomiting  Skin: Positive for wound  Negative for rash  Neurological: Negative for dizziness and light-headedness  Physical Exam  ED Triage Vitals [02/13/19 1403]   Temperature Pulse Respirations Blood Pressure SpO2   97 7 °F (36 5 °C) 99 18 142/64 99 %      Temp Source Heart Rate Source Patient Position - Orthostatic VS BP Location FiO2 (%)   Tympanic Monitor Sitting Right arm --      Pain Score       7           Orthostatic Vital Signs  Vitals:    02/13/19 1403   BP: 142/64   Pulse: 99   Patient Position - Orthostatic VS: Sitting       Physical Exam   Constitutional: She is oriented to person, place, and time  She appears well-developed and well-nourished  No distress  HENT:   Head: Normocephalic and atraumatic  Right Ear: External ear normal    Left Ear: External ear normal    Nose: Nose normal    Mouth/Throat: Oropharynx is clear and moist    Eyes: Pupils are equal, round, and reactive to light  Conjunctivae and EOM are normal    Neck: Normal range of motion  Neck supple  Cardiovascular: Normal rate, regular rhythm, normal heart sounds and intact distal pulses  Exam reveals no gallop and no friction rub  No murmur heard  Pulmonary/Chest: Effort normal and breath sounds normal  No stridor  No respiratory distress  She has no wheezes  She has no rales  She exhibits no tenderness  Abdominal: Soft   Bowel sounds are normal  She exhibits no distension  There is no tenderness  Musculoskeletal: Normal range of motion  She exhibits no edema  Right hand: She exhibits tenderness, laceration and swelling  She exhibits normal range of motion, no bony tenderness, normal two-point discrimination, normal capillary refill and no deformity  Normal sensation noted  Normal strength noted  Hands:  Neurological: She is alert and oriented to person, place, and time  Skin: Skin is warm and dry  Capillary refill takes less than 2 seconds  She is not diaphoretic  Psychiatric: She has a normal mood and affect  Her behavior is normal    Nursing note and vitals reviewed  ED Medications  Medications   tetanus-diphtheria-acellular pertussis (BOOSTRIX) IM injection 0 5 mL (0 5 mL Intramuscular Given 2/13/19 1438)   ketorolac (TORADOL) injection 15 mg (15 mg Intramuscular Given 2/13/19 1438)       Diagnostic Studies  Results Reviewed     None                 XR hand 3+ views RIGHT   ED Interpretation by Amy Fisher DO (02/13 1444)   No acute fracture or foreign body as interpreted by me independently            Procedures  Procedures      Phone Consults  ED Phone Contact    ED Course                               MDM  Number of Diagnoses or Management Options  Diagnosis management comments: Assessment and Plan:   XR to rule out fracture/ foreign body is negative  Augmentin, clean and irrigate wound with normal saline, update tetanus  Disposition  Final diagnoses:   Dog bite, initial encounter     Time reflects when diagnosis was documented in both MDM as applicable and the Disposition within this note     Time User Action Codes Description Comment    2/13/2019  2:46 PM Siddharth Mccray  0XXA] Dog bite, initial encounter       ED Disposition     ED Disposition Condition Date/Time Comment    Discharge Stable Wed Feb 13, 2019  2:46 PM Keya Dukes discharge to home/self care              Follow-up Information     Follow up With Specialties Details Why Contact Info Additional Information    Mariama Causey MD Family Medicine Schedule an appointment as soon as possible for a visit in 3 days For wound re-check [address] [facility]  [phone]       [address] [place] [address] Emergency Department Emergency Medicine Go to  As needed, If symptoms worsen, for re-evaluation [address] [place] [address] ED, [address], [place], [address]          Patient's Medications   Discharge Prescriptions    AMOXICILLIN-CLAVULANATE (AUGMENTIN) 875-125 MG PER TABLET    Take 1 tablet by mouth every 12 (twelve) hours for 7 days       Start Date: 2/13/2019 End Date: 2/20/2019       Order Dose: 1 tablet       Quantity: 14 tablet    Refills: 0     No discharge procedures on file  ED Provider  Attending physically available and evaluated Joo Allen I managed the patient along with the ED Attending      Electronically Signed by         Cleve Daniels DO  02/13/19 7314

## 2019-02-13 NOTE — ED ATTENDING ATTESTATION
Cristian Caba MD, saw and evaluated the patient  I have discussed the patient with the resident/non-physician practitioner and agree with the resident's/non-physician practitioner's findings, Plan of Care, and MDM as documented in the resident's/non-physician practitioner's note, except where noted  All available labs and Radiology studies were reviewed  At this point I agree with the current assessment done in the Emergency Department  I have conducted an independent evaluation of this patient a history and physical is as follows:    Right-hand-dominant female here with dog bite to right hand  Patient states that another dog was pulling on her dog ear, and her dog was crying  She tried to break up the fight, and was bit by 1 of the dogs  She is unsure which dog it was  Her dog is fully immunized  Patient does not know when her last tetanus shot was  She believes the other daughter was also immunized  Denies any other injuries  On exam, the patient has a puncture wound to her right thenar eminence as well as an abrasion to her right wrist   There is no bony deformity or bruising  She is neurovascularly intact  Impression:  Dog bite to hand    Agree with documentation  Critical Care Time  Procedures

## 2019-02-14 DIAGNOSIS — T78.40XA ALLERGIC DISORDER, INITIAL ENCOUNTER: ICD-10-CM

## 2019-02-14 DIAGNOSIS — E03.9 HYPOTHYROIDISM, UNSPECIFIED TYPE: ICD-10-CM

## 2019-02-15 RX ORDER — LEVOTHYROXINE SODIUM 0.1 MG/1
100 TABLET ORAL
Qty: 30 TABLET | Refills: 2 | Status: SHIPPED | OUTPATIENT
Start: 2019-02-15 | End: 2019-05-22 | Stop reason: SDUPTHER

## 2019-02-15 RX ORDER — MONTELUKAST SODIUM 10 MG/1
10 TABLET ORAL
Qty: 30 TABLET | Refills: 1 | Status: SHIPPED | OUTPATIENT
Start: 2019-02-15 | End: 2019-11-26

## 2019-02-26 ENCOUNTER — APPOINTMENT (EMERGENCY)
Dept: RADIOLOGY | Facility: HOSPITAL | Age: 34
End: 2019-02-26
Attending: EMERGENCY MEDICINE
Payer: MEDICARE

## 2019-02-26 ENCOUNTER — TELEPHONE (OUTPATIENT)
Dept: FAMILY MEDICINE CLINIC | Facility: CLINIC | Age: 34
End: 2019-02-26

## 2019-02-26 ENCOUNTER — APPOINTMENT (EMERGENCY)
Dept: RADIOLOGY | Facility: HOSPITAL | Age: 34
End: 2019-02-26
Payer: MEDICARE

## 2019-02-26 ENCOUNTER — HOSPITAL ENCOUNTER (EMERGENCY)
Facility: HOSPITAL | Age: 34
Discharge: HOME | End: 2019-02-26
Attending: EMERGENCY MEDICINE
Payer: MEDICARE

## 2019-02-26 VITALS
BODY MASS INDEX: 60.77 KG/M2 | WEIGHT: 293 LBS | SYSTOLIC BLOOD PRESSURE: 117 MMHG | HEART RATE: 74 BPM | DIASTOLIC BLOOD PRESSURE: 64 MMHG | RESPIRATION RATE: 16 BRPM | OXYGEN SATURATION: 97 % | TEMPERATURE: 98.9 F

## 2019-02-26 DIAGNOSIS — R51.9 HEADACHE: Primary | ICD-10-CM

## 2019-02-26 LAB
APPEARANCE CSF: CLEAR
EXT PREG TEST URINE: NEGATIVE
GLUCOSE CSF-MCNC: 50 MG/DL (ref 50–80)
GRAM STN SPEC: NORMAL
LYMPHOCYTES NFR CSF MANUAL: 85 %
MONOS+MACROS CSF MANUAL: 15 %
PROT CSF-MCNC: 41 MG/DL (ref 15–45)
TOTAL CELLS COUNTED BLD: NO
TOTAL CELLS COUNTED SPEC: 13
TUBE # CSF: 4
WBC # CSF AUTO: 2 /UL (ref 0–5)

## 2019-02-26 PROCEDURE — 62270 DX LMBR SPI PNXR: CPT

## 2019-02-26 PROCEDURE — 77003 FLUOROGUIDE FOR SPINE INJECT: CPT

## 2019-02-26 PROCEDURE — 62270 DX LMBR SPI PNXR: CPT | Performed by: RADIOLOGY

## 2019-02-26 PROCEDURE — 99284 EMERGENCY DEPT VISIT MOD MDM: CPT

## 2019-02-26 PROCEDURE — 82945 GLUCOSE OTHER FLUID: CPT | Performed by: EMERGENCY MEDICINE

## 2019-02-26 PROCEDURE — 87070 CULTURE OTHR SPECIMN AEROBIC: CPT | Performed by: EMERGENCY MEDICINE

## 2019-02-26 PROCEDURE — 84157 ASSAY OF PROTEIN OTHER: CPT | Performed by: EMERGENCY MEDICINE

## 2019-02-26 PROCEDURE — 81025 URINE PREGNANCY TEST: CPT | Performed by: EMERGENCY MEDICINE

## 2019-02-26 PROCEDURE — 77003 FLUOROGUIDE FOR SPINE INJECT: CPT | Performed by: RADIOLOGY

## 2019-02-26 PROCEDURE — 89051 BODY FLUID CELL COUNT: CPT | Performed by: EMERGENCY MEDICINE

## 2019-02-26 RX ORDER — LIDOCAINE HYDROCHLORIDE AND EPINEPHRINE 10; 10 MG/ML; UG/ML
1 INJECTION, SOLUTION INFILTRATION; PERINEURAL ONCE
Status: COMPLETED | OUTPATIENT
Start: 2019-02-26 | End: 2019-02-26

## 2019-02-26 RX ORDER — MIDAZOLAM HYDROCHLORIDE 2 MG/ML
2 SYRUP ORAL ONCE
Status: DISCONTINUED | OUTPATIENT
Start: 2019-02-26 | End: 2019-02-27 | Stop reason: HOSPADM

## 2019-02-26 RX ADMIN — LIDOCAINE HYDROCHLORIDE,EPINEPHRINE BITARTRATE 1 ML: 10; .01 INJECTION, SOLUTION INFILTRATION; PERINEURAL at 18:35

## 2019-02-26 NOTE — ED PROVIDER NOTES
History  Chief Complaint   Patient presents with    Headache     has been having headaches and seeing specailist is scheduled for spinal tab in 2 weeks but has been in bed with headache since saturday  had recent MRi at [facility]  pt also reprots sinus issues     79-year-old female with past medical history of idiopathic intracranial hypertension, bipolar disorder, and hypothyroidism who is presenting with worsening headache  Patient states that she has had a frontal headache since Saturday, February 24th  The pain is constant  She has been taking Tylenol without relief of the pain  Patient notes no factors that worsen the pain  Patient reports visual field defect at the periphery of her left eye  Patient denies any diplopia  No focal extremity numbness or weakness  No speech difficulty  No gait ataxia  No neck pain or neck stiffness  Patient does report chronic congestion  She has had reported sinus problems intermittently for the past several months  She has had improvement in her headaches in the past when she has taken antibiotics  Patient denies any nasal discharge  She does have pressure in her right ear  No sore throat  No fevers or chills  Patient has been following with a physician at [facility] regarding her idiopathic intracranial hypertension  She did receive an MRI at [facility] which demonstrated findings consistent with that diagnosis:  Partially empty sella, intraocular protrusion the left optic nerve, low lying cerebellar tonsils, and prominence of Meckel's cave are in keeping with the provided diagnosis of idiopathic intracranial hypertension    No mass lesion was demonstrated  Patient reports that she does have an appointment for a lumbar puncture for 2 weeks from now  Review of systems otherwise negative  Assessment and plan:  79-year-old female presenting with worsening headache and left visual field disturbance  Will plan to attempt lumbar puncture in the ED    Due to patient's body habitus, this will be technically difficult  Will discuss with IR  Prior to Admission Medications   Prescriptions Last Dose Informant Patient Reported? Taking? Cholecalciferol (VITAMIN D3) 5000 units CAPS  Self Yes No   Sig: Take 5,000 Units by mouth daily   HYDROcodone-acetaminophen (NORCO) 5-325 mg per tablet  Self Yes No   Sig: Take 1 tablet by mouth 2 (two) times a day as needed for pain (severe pain)   LORazepam (ATIVAN) 1 mg tablet  Self Yes No   Sig: Take 1 mg by mouth 3 (three) times a day as needed for anxiety   Multiple Vitamin (MULTIVITAMIN) capsule  Self Yes No   Sig: Take 1 capsule by mouth daily   RESTASIS 0 05 % ophthalmic emulsion  Self Yes No   Sig: PLACE 1 DROP INTO BOTH EYES TWICE A DAY   buPROPion (WELLBUTRIN XL) 300 mg 24 hr tablet  Self Yes No   clomiPRAMINE (ANAFRANIL) 50 mg capsule  Self Yes No   Sig: Take 100 mg by mouth 2 (two) times a day   fluticasone (FLONASE) 50 mcg/act nasal spray  Self Yes No   Si spray daily   gabapentin (NEURONTIN) 600 MG tablet  Self No No   Sig: TAKE 2 TABLETS BY MOUTH AT BEDTIME   levothyroxine 100 mcg tablet   No No   Sig: TAKE 1 TABLET (100 MCG TOTAL) BY MOUTH DAILY IN THE EARLY MORNING FOR 30 DAYS   lurasidone (LATUDA) 80 mg tablet  Self Yes No   Sig: Take 60 mg by mouth daily with breakfast     meloxicam (MOBIC) 7 5 mg tablet  Self No No   Sig: TAKE 1 AFTER BREAKFAST AND DINNER DAILY AS NEEDED FOR PAIN AND SCIATICA     montelukast (SINGULAIR) 10 mg tablet   No No   Sig: TAKE 1 TABLET (10 MG TOTAL) BY MOUTH DAILY AT BEDTIME   omeprazole (PriLOSEC) 20 mg delayed release capsule  Self No No   Sig: TAKE 1 CAPSULE (20 MG TOTAL) BY MOUTH DAILY IN THE EARLY MORNING FOR 30 DAYS   rOPINIRole (REQUIP) 4 mg tablet  Self No No   Sig: Take 1 tab  twice daily   sertraline (ZOLOFT) 100 mg tablet  Self Yes No   Sig: Take 200 mg by mouth daily      Facility-Administered Medications: None       Past Medical History:   Diagnosis Date    Anorexia nervosa in remission     Anxiety     Back pain     [facility] disorder [facility]     Depression     Esophageal reflux 8/15/2013    Hypertension     Hypothyroid     Idiopathic intracranial hypertension     Lumbar degenerative disc disease 5/8/2014    Obesity     Obsessive-compulsive disorder     Overactive bladder     Psychiatric disorder     Restless leg syndrome, controlled     Seasonal allergies     Suicide and self-inflicted injury ([facility] )        Past Surgical History:   Procedure Laterality Date    DENTAL SURGERY      SINUS ENDOSCOPY      TONSILLECTOMY         Family History   Problem Relation Age of Onset    Depression Mother     Suicide Attempts Mother     Hypertension Mother     Diabetes Mother     Other Mother         bicuspid aortic valve    Hypertension Father     Hypothyroidism Father     Mental illness Father     Thyroid disease Father     Hypertension Brother     Cancer Maternal Grandmother     Heart disease Maternal Grandmother     Stroke Maternal Grandmother     Breast cancer Maternal Grandmother     Skin cancer Maternal Grandmother     Pneumonia Maternal Grandfather     Cancer Maternal Grandfather     Cancer Paternal Grandmother     Pneumonia Paternal Grandfather     Arthritis Family     Breast cancer Family     Osteopenia Family     Osteoporosis Family     Hypertension Family     Transient ischemic attack Family      I have reviewed and agree with the history as documented  Social History     Tobacco Use    Smoking status: Never Smoker    Smokeless tobacco: Never Used   Substance Use Topics    Alcohol use: No    Drug use: No        Review of Systems   Constitutional: Negative for diaphoresis, fever and unexpected weight change  HENT: Negative for congestion, rhinorrhea and sore throat  Eyes: Positive for visual disturbance (left eye, peripheral)  Negative for pain and discharge  Respiratory: Negative for cough, shortness of breath and wheezing  Cardiovascular: Negative for chest pain, palpitations and leg swelling  Gastrointestinal: Negative for abdominal pain, blood in stool, constipation, diarrhea, nausea and vomiting  Genitourinary: Negative for dysuria, flank pain and hematuria  Musculoskeletal: Negative for arthralgias and joint swelling  Skin: Negative for rash and wound  Allergic/Immunologic: Negative for environmental allergies and food allergies  Neurological: Positive for headaches  Negative for dizziness, seizures, weakness and numbness  Hematological: Negative for adenopathy  Psychiatric/Behavioral: Negative for confusion and hallucinations  Physical Exam  ED Triage Vitals   Temperature Pulse Respirations Blood Pressure SpO2   02/26/19 1559 02/26/19 1559 02/26/19 1559 02/26/19 1559 02/26/19 1559   98 9 °F (37 2 °C) 81 18 142/75 100 %      Temp Source Heart Rate Source Patient Position - Orthostatic VS BP Location FiO2 (%)   02/26/19 1559 02/26/19 1559 02/26/19 1559 02/26/19 1559 --   Tympanic Monitor Sitting Right arm       Pain Score       02/26/19 1809       7           Orthostatic Vital Signs  Vitals:    02/26/19 1930 02/26/19 2156 02/26/19 2200 02/26/19 2230   BP: 128/60 115/57 111/59 117/64   Pulse: 80 76 76 74   Patient Position - Orthostatic VS:           Physical Exam   Constitutional: She is oriented to person, place, and time  She appears well-developed and well-nourished  No distress  Morbidly obese female in no distress  HENT:   Head: Normocephalic and atraumatic  Right Ear: External ear normal    Left Ear: External ear normal    TMs are normal bilaterally  Oropharynx is clear without erythema or exudate  No nasal discharge  Eyes: Pupils are equal, round, and reactive to light  Conjunctivae and EOM are normal    Neck: Normal range of motion  Neck supple  Cardiovascular: Normal rate, regular rhythm and normal heart sounds  No murmur heard    Pulmonary/Chest: Effort normal and breath sounds normal  No respiratory distress  She has no wheezes  She has no rales  Abdominal: Soft  Bowel sounds are normal  She exhibits no mass  There is no tenderness  There is no guarding  Musculoskeletal: Normal range of motion  She exhibits no deformity  Neurological: She is alert and oriented to person, place, and time  Patient is alert and oriented to time, person, place, and situation  Speech is fluent with no aphasia or dysarthria  CN II-XII are intact, apart from left lower vision field defect  Strength is 5/5 in the upper and lower extremities bilaterally  Sensation grossly intact  No dysmetria on finger to nose testing  No pronator drift  Skin: Skin is warm and dry  Capillary refill takes less than 2 seconds  Psychiatric: She has a normal mood and affect  Her behavior is normal  Thought content normal    Nursing note and vitals reviewed        ED Medications  Medications   lidocaine-epinephrine (XYLOCAINE/EPINEPHRINE) 1 %-1:100,000 injection 1 mL (1 mL Infiltration Given by Other 2/26/19 1835)       Diagnostic Studies  Results Reviewed     Procedure Component Value Units Date/Time    STAT Gram Stain [[id number]] Collected:  02/26/19 2139    Lab Status:  Final result Specimen:  Other Updated:  02/26/19 2258     Gram Stain Result No Polys or Bacteria seen    CSF, White cell count with differential (Tube 4) [[id number]] Collected:  02/26/19 2102    Lab Status:  Final result Specimen:  Cerebrospinal Fluid from Lumbar Puncture Updated:  02/26/19 2241     Appearance, CSF CLEAR     Tube Number, CSF 4     WBC, CSF 2 /uL      Xanthochromia No    CSF Diff [[id number]] Collected:  02/26/19 2102    Lab Status:  Final result Specimen:  Cerebrospinal Fluid from Lumbar Puncture Updated:  02/26/19 2241     Total Counted 13     Lymphs % CSF 85 %      Monocytes % (CSF) 15 %     CSF, Glucose (Tube 2) [[id number]]  (Normal) Collected:  02/26/19 2103    Lab Status:  Final result Specimen:  Cerebrospinal Fluid from Lumbar Puncture Updated:  02/26/19 2149     Glucose, CSF 50 mg/dL     CSF, Total Protein (Tube 2) [[id number]]  (Normal) Collected:  02/26/19 2103    Lab Status:  Final result Specimen:  Cerebrospinal Fluid from Lumbar Puncture Updated:  02/26/19 2149     Protein, CSF 41 mg/dL     CSF, Culture and Gram stain (Tube 3) [[id number]] Collected:  02/26/19 2102    Lab Status: In process Specimen:  Cerebrospinal Fluid from Lumbar Puncture Updated:  02/26/19 2123    POCT pregnancy, urine [[id number]]  (Normal) Resulted:  02/26/19 1915    Lab Status:  Final result Updated:  02/26/19 1915     EXT PREG TEST UR (Ref: Negative) NEGATIVE                 IR lumbar puncture    (Results Pending)         Procedures  Lumbar Puncture  Date/Time: 2/26/2019 7:10 PM  Performed by: Natty Gao MD  Authorized by: Natty Gao MD     Patient location:  ED  Other Assisting Provider: Yes (comment) (Dr Zuhair Islas)    Consent:     Consent obtained:  Verbal    Consent given by:  Patient    Risks discussed:  Pain, bleeding, infection, headache and repeat procedure  Universal protocol:     Procedure explained and questions answered to patient or proxy's satisfaction: yes      Patient identity confirmed:  Verbally with patient and arm band  Pre-procedure details:     Preparation: Patient was prepped and draped in usual sterile fashion    Indications:     Indications: evaluation of opening pressure and therapeutic intervention    Anesthesia (see MAR for exact dosages): Anesthesia method:  Local infiltration    Local anesthetic:  Lidocaine 1% WITH epi  Procedure details:     Lumbar space:  L4-L5 interspace    Patient position:  R lateral decubitus    Equipment: Lumbar puncture kit used      Needle type:  Spinal needle - Quincke tip    Ultrasound guidance: yes      Number of attempts:  1  Comments:      Unsuccessful attempt at lumbar puncture  Ultrasound was used to identify landmarks due to patient's difficult anatomy    Spinous processes were identified 6-7 cm deep to the skin  A 5 cm needle was used  Patient tolerated the procedure well  Attempt was unsuccessful; the needle was hubbed but no bone was felt  Phone Consults  ED Phone Contact    ED Course  ED Course as of Feb 27 0843 Tue Feb 26, 2019   1034 Spoke with Dr Ulises Floyd from IR  We discussed the patient's presentation  Because the patient is young and has no significant comorbidities, he is okay with no labs prior to LP attempts  Will attempt 1 LP in the ED and contact IR if we are unsuccessful  1904 Unsuccessful LP attempt  Will discuss with IR       1916 PREGNANCY TEST URINE: NEGATIVE                               MDM  Number of Diagnoses or Management Options  Headache: established and worsening  Diagnosis management comments:     As above, patient presented with headache  She reported a visual field defect at the periphery of the left visual field but otherwise did not have any neurological deficits on history  Neurological examination was unremarkable  Patient recently had an MRI performed which demonstrated findings consistent with idiopathic intracranial hypertension  Patient did have a history of the same  There were no elements on history or physical examination to suggest a more serious acute etiology for headache such as neoplasm, ICH, subarachnoid hemorrhage, meningitis, or encephalitis  We attempted a lumbar puncture in the ED but it was very technically difficult due to the patient's body habitus  We used ultrasound guidance and found that the spinous processes were at least 6-7 cm from the skin and the large needle we had available was 5 cm in length  Our lumbar puncture attempt was unsuccessful  Discussed with IR who performed the lumbar puncture under fluoroscopic guidance  Patient was signed out to Dr Ken Looney pending IR lumbar puncture  CSF studies were sent and were unremarkable    Per IR's note, opening pressure was normal but the precise pressure was not listed  Amount and/or Complexity of Data Reviewed  Clinical lab tests: ordered and reviewed  Decide to obtain previous medical records or to obtain history from someone other than the patient: yes  Review and summarize past medical records: yes  Discuss the patient with other providers: yes    Risk of Complications, Morbidity, and/or Mortality  Presenting problems: low  Diagnostic procedures: minimal  Management options: minimal    Patient Progress  Patient progress: improved      Disposition  Final diagnoses:   Headache     Time reflects when diagnosis was documented in both MDM as applicable and the Disposition within this note     Time User Action Codes Description Comment    2/26/2019 11:05 PM Donita Acosta Add [R51] Headache       ED Disposition     ED Disposition Condition Date/Time Comment    Discharge Stable Tue Feb 26, 2019 11:05 PM Meri Wong discharge to home/self care              Follow-up Information     Follow up With Specialties Details Why Contact Info Additional [facility] Schedule an appointment as soon as possible for a visit in 1 week for re-evaluation [facility],[address] Neurology [facility], [address] [place] [address], [facility] Emergency Department Emergency Medicine Go to  As needed, If symptoms worsen, for re-evaluation [address]  [phone]  ED, [address] [place] [address], [facility] Barragan MD [facility] Schedule an appointment as soon as possible for a visit in 3 days for re-evaluation Jose Elias [address] [facility]  [phone]             Discharge Medication List as of 2/26/2019 11:06 PM      CONTINUE these medications which have NOT CHANGED    Details   buPROPion (WELLBUTRIN XL) 300 mg 24 hr tablet Starting Tue 7/31/2018, Historical Med      Cholecalciferol (VITAMIN D3) 5000 units CAPS Take 5,000 Units by mouth daily, Historical Med      clomiPRAMINE (ANAFRANIL) 50 mg capsule Take 100 mg by mouth 2 (two) times a day, Historical Med      fluticasone (FLONASE) 50 mcg/act nasal spray 1 spray daily, Starting Tue 11/20/2018, Historical Med      gabapentin (NEURONTIN) 600 MG tablet TAKE 2 TABLETS BY MOUTH AT BEDTIME, Normal      HYDROcodone-acetaminophen (NORCO) 5-325 mg per tablet Take 1 tablet by mouth 2 (two) times a day as needed for pain (severe pain), Historical Med      levothyroxine 100 mcg tablet TAKE 1 TABLET (100 MCG TOTAL) BY MOUTH DAILY IN THE EARLY MORNING FOR 30 DAYS, Starting Fri 2/15/2019, Until Sun 3/17/2019, Normal      LORazepam (ATIVAN) 1 mg tablet Take 1 mg by mouth 3 (three) times a day as needed for anxiety, Historical Med      lurasidone (LATUDA) 80 mg tablet Take 60 mg by mouth daily with breakfast  , Historical Med      meloxicam (MOBIC) 7 5 mg tablet TAKE 1 AFTER BREAKFAST AND DINNER DAILY AS NEEDED FOR PAIN AND SCIATICA , Normal      montelukast (SINGULAIR) 10 mg tablet TAKE 1 TABLET (10 MG TOTAL) BY MOUTH DAILY AT BEDTIME, Starting Fri 2/15/2019, Normal      Multiple Vitamin (MULTIVITAMIN) capsule Take 1 capsule by mouth daily, Historical Med      omeprazole (PriLOSEC) 20 mg delayed release capsule TAKE 1 CAPSULE (20 MG TOTAL) BY MOUTH DAILY IN THE EARLY MORNING FOR 30 DAYS, Starting Mon 10/1/2018, Until Wed 2/13/2019, Normal      RESTASIS 0 05 % ophthalmic emulsion PLACE 1 DROP INTO BOTH EYES TWICE A DAY, Historical Med      rOPINIRole (REQUIP) 4 mg tablet Take 1 tab  twice daily, Normal      sertraline (ZOLOFT) 100 mg tablet Take 200 mg by mouth daily, Starting Fri 7/20/2018, Historical Med           No discharge procedures on file  ED Provider  Attending physically available and evaluated Erendira Senior  DANIELLE managed the patient along with the ED Attending      Electronically Signed by Jarrett Winslow MD  02/27/19 1640

## 2019-02-26 NOTE — TELEPHONE ENCOUNTER
Please call patient  Usually spinal tap is performed by neurologist     She would need to get a order for spinal tap from specialist in [facility]  What is suspected Dx?

## 2019-02-26 NOTE — TELEPHONE ENCOUNTER
Patient has seen a neuro ophthalmologist in [place] and her next step is to get a spinal tap  Do you know the name of a physician in the [facility] that will do this procedure as transportation from [place] post-procedure is a problem for patient  Please contact patient at [phone]

## 2019-02-26 NOTE — ED ATTENDING ATTESTATION
Fany Vargas MD, saw and evaluated the patient  All available labs and X-rays were ordered by me or the resident and have been reviewed by myself  I discussed the patient with the resident / non-physician and agree with the resident's / non-physician practitioner's findings and plan as documented in the resident's / non-physician practicitioner's note, except where noted  At this point, I agree with the current assessment done in the ED  I was present during key portions of all procedures performed unless otherwise stated  Chief Complaint   Patient presents with    Headache     has been having headaches and seeing specailist is scheduled for spinal tab in 2 weeks but has been in bed with headache since saturday  had recent MRi at [address]  pt also reprots sinus issues     27 y/o F with worsening headache since Saturday, feeling constant in the frontal region, tylenol doesn't help  Nothing makes it worse  Left peripheral vision feels different but not complete loss of vision  Denies focal neurologic deficits  No motor deficits, no gait instability  She did have a Recent MRI 2/13/19:   Partially empty sella, intraocular protrusion the left optic nerve, low lying  cerebellar tonsils, and prominence of Meckel's cave are in keeping with the  provided diagnosis of idiopathic intracranial hypertension  A cystic focus demonstrated within the superior aspect of the left ethmoid air  cell complex containing debris following brain on all pulse sequences is  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  No clear contiguity with the left  frontal lobe is demonstrated and no obvious osseous defect is evident, as such,  this may represent inflammatory debris related to paranasal sinus mucosal  disease  Correlation with high-resolution CT of the maxillofacial region is  recommended for correlation      No abnormal optic nerve enhancement or other orbital abnormality detected in  either orbit on this nondedicated examination aside from intraocular protrusion  of the left optic nerve head  No dural sinus thrombosis is evident  The distal aspect of the right transverse sinus is relatively diminutive in  caliber  The junction of the left transverse and sigmoid sinus Is focally  narrowed  The clinical significance of these findings is unclear       Due for lumbar puncture in 2 weeks but feels she can't wait that long  PMH:  - Super super obese (BMI 61)  - Bipolar, OCD, anxiety  - HTN  - Hypothyroidism  - RLS  - Anorexia  - Depression  - IIH  PSH:  - Tonsillectomy  - Sinus endoscopy  - Dental surgery  No smoking drinking drugs  PE:  Vitals:    02/26/19 1930 02/26/19 2156 02/26/19 2200 02/26/19 2230   BP: 128/60 115/57 111/59 117/64   BP Location:  Right arm     Pulse: 80 76 76 74   Resp: 16 16 16 16   Temp:       TempSrc:       SpO2: 96% 99% 97% 97%   Weight:       General: VSS, NAD, awake, alert  Well-nourished, well-developed  Appears stated age  Speaking normally in full sentences  Head: Normocephalic, atraumatic, nontender  Eyes: PERRL, EOM-I  No diplopia  No hyphema  No subconjunctival hemorrhages  Symmetrical lids  ENT: Atraumatic external nose and ears  MMM  No malocclusion  No stridor  Normal phonation  No drooling  Normal swallowing  Neck: Symmetric, trachea midline  No JVD  CV: RRR  +S1/S2  No murmurs or gallops  Peripheral pulses +2 throughout  No chest wall tenderness  Lungs:   Unlabored No retractions  CTAB, lungs sounds equal bilateral    No tachypnea  Abd: +BS, soft, NT/ND  MSK:   FROM   Back:   No rashes  Skin: Dry, intact  Neuro: AAOx3, GCS 15, CN II-XII grossly intact  Motor grossly intact  Sensory grossly intact  She states that the LEFT (LL) field of the eye has decreased vision  Psychiatric/Behavioral: Appropriate mood and affect   Exam: deferred  A:  - IIH  - Headache  - Visual change  P:  - Migraine cocktail  - LP (diagnostic therapeutic);  I cannot palpate any anatomy (iliac crests, spinous processes)  Will attempt U/S demarcation  - Given I can't feel landmarks, likely IR consult  - 13 point ROS was performed and all are normal unless stated in the history above  - Nursing note reviewed  Vitals reviewed  - Orders placed by myself and/or advanced practitioner / resident     - Previous chart was reviewed  - No language barrier    - History obtained from patient  - There are no limitations to the history obtained  - Critical care time: Not applicable for this patient  Final Diagnosis:  1  Headache        ED Course as of Feb 27 1854   Tue Feb 26, 2019   Rosa Ojeda I was present for the entire lumbar puncture attempt by the resident  We used ultrasound to located anatomy including iliac crests   We demarcated site of entry  We were able to hub needle at midline and not hit bone  Patient stated that she felt it was in the center of her back  Likely depth using U/S was approx 6cm  Needle used was 5cm  Medications   lidocaine-epinephrine (XYLOCAINE/EPINEPHRINE) 1 %-1:100,000 injection 1 mL (1 mL Infiltration Given by Other 2/26/19 1835)     IR lumbar puncture   Final Result   Impression:      Successful lumbar puncture under fluoroscopic guidance           Workstation performed: [id number]           Orders Placed This Encounter   Procedures    Lumbar puncture    CSF, Total Protein (Tube 2)    CSF, Culture and Gram stain (Tube 3)    STAT Gram Stain    IR lumbar puncture    CSF, Glucose (Tube 2)    CSF, White cell count with differential (Tube 4)    CSF Diff    POCT pregnancy, urine     Labs Reviewed   PROTEIN TOTAL, CSF - Normal       Result Value Ref Range Status    Protein, CSF 41  15 - 45 mg/dL Final   GLUCOSE, CSF - Normal    Glucose, CSF 50  50 - 80 mg/dL Final   POCT PREGNANCY, URINE - Normal    EXT PREG TEST UR (Ref: Negative) NEGATIVE   Final   STAT GRAM STAIN    Gram Stain Result No Polys or Bacteria seen   Final   CSF WBC COUNT WITH DIFFERENTIAL    Appearance, CSF CLEAR   Final    Tube Number, CSF 4   Final    WBC, CSF 2  0 - 5 /uL Final    Xanthochromia No  No Final   CSF DIFF    Total Counted 13   Final    Lymphs % CSF 85  % Final    Monocytes % (CSF) 15  % Final     Time reflects when diagnosis was documented in both MDM as applicable and the Disposition within this note     Time User Action Codes Description Comment    2/26/2019 11:05 PM [address] [R51] Headache       ED Disposition     ED Disposition Condition Date/Time Comment    Discharge Stable Tue Feb 26, 2019 11:05 PM Valencia Coker discharge to home/self care              Follow-up Information     Follow up With Specialties Details Why Contact Info Additional [facility] [place] [facility] Schedule an appointment as soon as possible for a visit in 1 week for re-evaluation [facility] Neurology [place], [address] [place] [address], [address] [facility] Emergency Department Emergency Medicine Go to  As needed, If symptoms worsen, for re-evaluation [address]  [phone]  ED, [address] [place] [address], [address] [place] [address], MD Family Medicine Schedule an appointment as soon as possible for a visit in 3 days for re-evaluation Jose Elias [address] [facility]  [phone]           Discharge Medication List as of 2/26/2019 11:06 PM      CONTINUE these medications which have NOT CHANGED    Details   buPROPion (WELLBUTRIN XL) 300 mg 24 hr tablet Starting Tue 7/31/2018, Historical Med      Cholecalciferol (VITAMIN D3) 5000 units CAPS Take 5,000 Units by mouth daily, Historical Med      clomiPRAMINE (ANAFRANIL) 50 mg capsule Take 100 mg by mouth 2 (two) times a day, Historical Med      fluticasone (FLONASE) 50 mcg/act nasal spray 1 spray daily, Starting Tue 11/20/2018, Historical Med      gabapentin (NEURONTIN) 600 MG tablet TAKE 2 TABLETS BY MOUTH AT BEDTIME, Normal      HYDROcodone-acetaminophen (NORCO) 5-325 mg per tablet Take 1 tablet by mouth 2 (two) times a day as needed for pain (severe pain), Historical Med      levothyroxine 100 mcg tablet TAKE 1 TABLET (100 MCG TOTAL) BY MOUTH DAILY IN THE EARLY MORNING FOR 30 DAYS, Starting Fri 2/15/2019, Until Sun 3/17/2019, Normal      LORazepam (ATIVAN) 1 mg tablet Take 1 mg by mouth 3 (three) times a day as needed for anxiety, Historical Med      lurasidone (LATUDA) 80 mg tablet Take 60 mg by mouth daily with breakfast  , Historical Med      meloxicam (MOBIC) 7 5 mg tablet TAKE 1 AFTER BREAKFAST AND DINNER DAILY AS NEEDED FOR PAIN AND SCIATICA , Normal      montelukast (SINGULAIR) 10 mg tablet TAKE 1 TABLET (10 MG TOTAL) BY MOUTH DAILY AT BEDTIME, Starting Fri 2/15/2019, Normal      Multiple Vitamin (MULTIVITAMIN) capsule Take 1 capsule by mouth daily, Historical Med      omeprazole (PriLOSEC) 20 mg delayed release capsule TAKE 1 CAPSULE (20 MG TOTAL) BY MOUTH DAILY IN THE EARLY MORNING FOR 30 DAYS, Starting Mon 10/1/2018, Until Wed 2/13/2019, Normal      RESTASIS 0 05 % ophthalmic emulsion PLACE 1 DROP INTO BOTH EYES TWICE A DAY, Historical Med      rOPINIRole (REQUIP) 4 mg tablet Take 1 tab  twice daily, Normal      sertraline (ZOLOFT) 100 mg tablet Take 200 mg by mouth daily, Starting Fri 7/20/2018, Historical Med           No discharge procedures on file  Prior to Admission Medications   Prescriptions Last Dose Informant Patient Reported? Taking?    Cholecalciferol (VITAMIN D3) 5000 units CAPS  Self Yes No   Sig: Take 5,000 Units by mouth daily   HYDROcodone-acetaminophen (NORCO) 5-325 mg per tablet  Self Yes No   Sig: Take 1 tablet by mouth 2 (two) times a day as needed for pain (severe pain)   LORazepam (ATIVAN) 1 mg tablet  Self Yes No   Sig: Take 1 mg by mouth 3 (three) times a day as needed for anxiety   Multiple Vitamin (MULTIVITAMIN) capsule  Self Yes No   Sig: Take 1 capsule by mouth daily   RESTASIS 0 05 % ophthalmic emulsion  Self Yes No   Sig: PLACE 1 DROP INTO BOTH EYES TWICE A DAY   buPROPion (WELLBUTRIN XL) 300 mg 24 hr tablet  Self Yes No   clomiPRAMINE (ANAFRANIL) 50 mg capsule  Self Yes No   Sig: Take 100 mg by mouth 2 (two) times a day   fluticasone (FLONASE) 50 mcg/act nasal spray  Self Yes No   Si spray daily   gabapentin (NEURONTIN) 600 MG tablet  Self No No   Sig: TAKE 2 TABLETS BY MOUTH AT BEDTIME   levothyroxine 100 mcg tablet   No No   Sig: TAKE 1 TABLET (100 MCG TOTAL) BY MOUTH DAILY IN THE EARLY MORNING FOR 30 DAYS   lurasidone (LATUDA) 80 mg tablet  Self Yes No   Sig: Take 60 mg by mouth daily with breakfast     meloxicam (MOBIC) 7 5 mg tablet  Self No No   Sig: TAKE 1 AFTER BREAKFAST AND DINNER DAILY AS NEEDED FOR PAIN AND SCIATICA  montelukast (SINGULAIR) 10 mg tablet   No No   Sig: TAKE 1 TABLET (10 MG TOTAL) BY MOUTH DAILY AT BEDTIME   omeprazole (PriLOSEC) 20 mg delayed release capsule  Self No No   Sig: TAKE 1 CAPSULE (20 MG TOTAL) BY MOUTH DAILY IN THE EARLY MORNING FOR 30 DAYS   rOPINIRole (REQUIP) 4 mg tablet  Self No No   Sig: Take 1 tab  twice daily   sertraline (ZOLOFT) 100 mg tablet  Self Yes No   Sig: Take 200 mg by mouth daily      Facility-Administered Medications: None       Portions of the record may have been created with voice recognition software  Occasional wrong word or "sound a like" substitutions may have occurred due to the inherent limitations of voice recognition software  Read the chart carefully and recognize, using context, where substitutions have occurred      Electronically signed by:  Rick Garcia

## 2019-02-27 NOTE — TELEPHONE ENCOUNTER
I spoke to patient and she actually will be here tomorrow evening to discuss this with Dr Kaia Gregory

## 2019-02-27 NOTE — ED NOTES
Pt ambulated to the restroom without difficulty   Dr Therese Delacruz aware     Brendon Keller, ANAHI  02/26/19 2692

## 2019-02-27 NOTE — DISCHARGE INSTRUCTIONS
Lumbar Puncture     WHAT YOU NEED TO KNOW:   Lumbar puncture (LP) is a procedure in which a needle is inserted in your back and into your spinal canal  This is usually done to collect cerebrospinal fluid (CSF) to check for an infection, inflammation, bleeding, or other conditions that affect the brain  CSF is a clear, protective fluid that flows around the brain and inside the spinal canal  LP may also be done to remove CSF to reduce pressure in the brain  DISCHARGE INSTRUCTIONS:     Follow up with your healthcare provider as directed: Write down your questions so you remember to ask them during your visits  Post-lumbar puncture headache: You may develop a headache during the first few hours after your LP that may last for several days  The headache may be mild to severe and may get worse when you sit or stand  The following may help ease a post-lumbar puncture headache:  · Drink plenty of liquids: You should drink more liquid than usual after your LP  Ask how much liquid is right for you  Caffeine may be used to treat a headache  Drinks, such as coffee, tea, or some sodas, have caffeine  Ask a Do not drink alcohol  · Lie down: If you have a headache after your lumbar puncture, it may be helpful to lie down and rest   · You may have a slight soreness over the LP area  This is normal   · Remove the band aid or dressing in 24 hours  · Contact Interventional Radiology imediately  at [phone] Enzo PATIENTS: Contact Interventional Radiology at [phone]) Kurtis Menendez PATIENTS: Contact Interventional Radiology at [phone]) if any of the following occur:  · You have a severe headache that does not get better after you lie down  · Persistent nausea or vomiting   · You have a fever  · You have a stiff neck or have trouble thinking clearly  · Your legs, feet, or other parts below the waist feel numb, tingly, or weak     · You have bleeding or a discharge coming from the area where the needle was put into your back  · You have severe pain in your back or neck

## 2019-02-27 NOTE — BRIEF OP NOTE (RAD/CATH)
L2-3 lumbar puncture with fluoroscopy :  Procedure Note    PATIENT NAME: Yamilex Heart  : 1985  MRN: [id number]     Pre-op Diagnosis: No diagnosis found  Post-op Diagnosis: No diagnosis found  Surgeon:   Gato Dillon MD  Assistants:     No qualified resident was available, Resident is only observing    Estimated Blood Loss: minimal     Findings:     L2-3 lumbar puncture  Clear CSF  Normal opening pressure  Normal closing pressure  17 mL total volume of CSF obtained       Specimens: 17 mL clear CSF    Complications:  none    Anesthesia: Dominick Lance MD     Date: 2019  Time: 9:09 PM

## 2019-02-27 NOTE — ED NOTES
CSF labs ordered by Dr Mary Nielsen RN from [facility] here to transport pt     Sajan Hong RN  02/26/19 2023

## 2019-02-27 NOTE — ED NOTES
Per IR nurse, Dr Brooke Reyes will not take pt for LP without CSF labs       Brendon Keller, ANAHI  02/26/19 [address] [place] [address]  02/26/19 2014

## 2019-02-28 ENCOUNTER — OFFICE VISIT (OUTPATIENT)
Dept: FAMILY MEDICINE CLINIC | Facility: CLINIC | Age: 34
End: 2019-02-28
Payer: MEDICARE

## 2019-02-28 VITALS
TEMPERATURE: 97.8 F | DIASTOLIC BLOOD PRESSURE: 82 MMHG | RESPIRATION RATE: 16 BRPM | HEART RATE: 80 BPM | SYSTOLIC BLOOD PRESSURE: 134 MMHG | OXYGEN SATURATION: 98 %

## 2019-02-28 DIAGNOSIS — G93.2 IDIOPATHIC INTRACRANIAL HYPERTENSION: ICD-10-CM

## 2019-02-28 DIAGNOSIS — I10 ESSENTIAL HYPERTENSION: Chronic | ICD-10-CM

## 2019-02-28 DIAGNOSIS — H53.40 VISUAL FIELD DEFECT: ICD-10-CM

## 2019-02-28 DIAGNOSIS — J32.9 RECURRENT SINUSITIS: ICD-10-CM

## 2019-02-28 DIAGNOSIS — E03.9 ACQUIRED HYPOTHYROIDISM: ICD-10-CM

## 2019-02-28 DIAGNOSIS — R51.9 ACUTE NONINTRACTABLE HEADACHE, UNSPECIFIED HEADACHE TYPE: Primary | ICD-10-CM

## 2019-02-28 DIAGNOSIS — E66.01 MORBID OBESITY (HCC): ICD-10-CM

## 2019-02-28 PROBLEM — G89.29 CHRONIC HEADACHE: Status: ACTIVE | Noted: 2019-02-28

## 2019-02-28 PROCEDURE — 99215 OFFICE O/P EST HI 40 MIN: CPT | Performed by: FAMILY MEDICINE

## 2019-02-28 NOTE — PROGRESS NOTES
Chief Complaint   Patient presents with    Follow-up     2/26/19 Headaches     Health Maintenance   Topic Date Due    Medicare Annual Wellness Visit (AWV)  08/20/2019    BMI: Followup Plan  08/20/2019    BMI: Adult  02/26/2020    PAP SMEAR  02/26/2021    DTaP,Tdap,and Td Vaccines (2 - Td) 02/13/2029    INFLUENZA VACCINE  Completed    HEPATITIS B VACCINES  Aged Out     Assessment/Plan:    Patient present for ER follow-up visit for headache  Acute headache  Patient presented to [facility] emergency room on 2/26/19 c/o frontal headache, visual field defect at the periphery of her left eye  Lumbar puncture was performed at Interventional Radiology Department under fluoroscopic guidance  CSF studies were sent and were unremarkable  Patient reports improvement in headache  Recommended to take Tylenol PRN  Follow- up with neuroophthalmologist at [facility] due to prior left optic nerve swelling  Idiopathic intracranial hypertension  Patient had MRI brain done in [facility] this month which demonstrated findings consistent with idiopathic intracranial hypertension  Recommended to follow up with neuroopthalmologist at [facility] and schedule appointment with [facility]  Hypertension  BP is stable  Recommended to follow a low-sodium diet, lose weight  Recurrent sinusitis  Follow- up with ENT Dr Victorino Olvera  Recommended saline sinus rinses  Morbid obesity ([address] )  Encouraged regular exercise, weight reduction  Hypothyroidism  Continue Levothyroxine 100 mcg daily  Diagnoses and all orders for this visit:    Acute nonintractable headache, unspecified headache type    Idiopathic intracranial hypertension  -     Ambulatory referral to Neurology;  Future    Visual field defect    Essential hypertension    Recurrent sinusitis    Morbid obesity ([address] )    Acquired hypothyroidism          Subjective:      Patient ID: Rodrigo Fisher is a 28 y o  female  HPI     Patient presents for ER follow-up visit  She was seen in [facility] emergency room on February 26, 2019 for evaluation of acute headache  Patient presented to ER c/o frontal headache, visual field defect at the periphery of her left eye  Neurological examination was unremarkable  Patient reports that she was seen by neuroopthalmologist in [facility] Emergency Department in January 2019  Exam revealed left optic nerve swelling and she was recommended to schedule MRI brain  MRI brain performed in [facility]  demonstrated findings consistent with idiopathic intracranial hypertension  ER physician attempted to perform a lumbar puncture but it was technically difficult due to the patient's body habitus  She was referred to Interventional Radiology for lumbar puncture under fluoroscopic guidance  Opening pressure was normal   CSF studies were sent and were unremarkable  Patient c/o mild headache today  Denies dizziness  Patient has H/o recurrent sinus infections, allergic rhinitis  She states that her headaches usually improved with antibiotic therapy  Patient was evaluated in 1/19 by ENT Dr Nabil Bajwa   who recommended sinus surgery  PMHx: HTN, Hypothyroidism, GERD, OCD, PTSD, EILEEN, Morbid obesity, chronic low back pain  Reviewed all current medications with patient  HTN - blood pressure is stable  Lisinopril was d/c in 8/18 due to low blood pressure readings  Patient denies chest pain, shortness of breath, heart palpitations  Hypothyroidism - taking Levothyroxine 100 mcg daily  TSH 1 730 - checked in November 2018  The following portions of the patient's history were reviewed and updated as appropriate: allergies, current medications, past family history, past social history, past surgical history and problem list     Review of Systems   Constitutional: Positive for fatigue   Negative for activity change, appetite change, chills and fever    HENT: Positive for postnasal drip  Negative for congestion, ear pain, hearing loss, nosebleeds, sinus pressure, sore throat, tinnitus and trouble swallowing  C/o dry nasal mucosa   Eyes: Positive for visual disturbance (Visual field defect at the periphery of the left eye)  Negative for pain, discharge, redness and itching  Respiratory: Negative for cough, chest tightness, shortness of breath and wheezing  Cardiovascular: Negative for chest pain, palpitations and leg swelling  Gastrointestinal: Negative for abdominal pain, constipation, diarrhea, nausea and vomiting  Genitourinary: Negative for difficulty urinating, dysuria, flank pain, frequency, hematuria and pelvic pain  Musculoskeletal: Positive for back pain (chronic low back pain)  Negative for gait problem, joint swelling and neck pain  Skin: Negative for rash and wound  Neurological: Positive for headaches  Negative for dizziness and syncope  Hematological: Negative  Psychiatric/Behavioral: Positive for sleep disturbance  Anxiety/ OCD         Objective:      /82 (BP Location: Left arm, Patient Position: Sitting, Cuff Size: Large)   Pulse 80   Temp 97 8 °F (36 6 °C) (Tympanic)   Resp 16   SpO2 98%          Physical Exam   Constitutional: She is oriented to person, place, and time  She appears well-developed and well-nourished  Morbidly obese   HENT:   Head: Normocephalic and atraumatic  Right Ear: External ear normal    Left Ear: External ear normal    Mouth/Throat: Oropharynx is clear and moist    Nasal mucosa is fragile, dry   Eyes: Pupils are equal, round, and reactive to light  Conjunctivae are normal    Cardiovascular: Normal rate, regular rhythm and normal heart sounds  No murmur heard  No BL LE edema   Pulmonary/Chest: Effort normal and breath sounds normal    Abdominal: Soft  Bowel sounds are normal  There is no tenderness  Musculoskeletal: Normal range of motion   She exhibits no edema, tenderness or deformity  Neurological: She is alert and oriented to person, place, and time  No cranial nerve deficit  Coordination normal    Skin: Skin is warm and dry  No erythema  Psychiatric: She has a normal mood and affect  Nursing note and vitals reviewed

## 2019-03-01 ENCOUNTER — TELEPHONE (OUTPATIENT)
Dept: NEUROLOGY | Facility: CLINIC | Age: 34
End: 2019-03-01

## 2019-03-01 LAB — BACTERIA CSF CULT: NO GROWTH

## 2019-03-01 NOTE — ASSESSMENT & PLAN NOTE
Patient had MRI brain done in [facility] this month which demonstrated findings consistent with idiopathic intracranial hypertension  Recommended to follow up with neuroopthalmologist at [facility] and schedule appointment with [facility]

## 2019-03-01 NOTE — ASSESSMENT & PLAN NOTE
Patient presented to [facility] emergency room on 2/26/19 c/o frontal headache, visual field defect at the periphery of her left eye  Lumbar puncture was performed at Interventional Radiology Department under fluoroscopic guidance  CSF studies were sent and were unremarkable  Patient reports improvement in headache  Recommended to take Tylenol PRN  Follow- up with neuroophthalmologist at [facility] due to prior left optic nerve swelling

## 2019-03-04 ENCOUNTER — CONSULT (OUTPATIENT)
Dept: NEUROLOGY | Facility: CLINIC | Age: 34
End: 2019-03-04
Payer: MEDICARE

## 2019-03-04 VITALS
DIASTOLIC BLOOD PRESSURE: 78 MMHG | HEIGHT: 67 IN | HEART RATE: 72 BPM | WEIGHT: 293 LBS | SYSTOLIC BLOOD PRESSURE: 130 MMHG | BODY MASS INDEX: 45.99 KG/M2

## 2019-03-04 DIAGNOSIS — E55.9 VITAMIN D DEFICIENCY: ICD-10-CM

## 2019-03-04 DIAGNOSIS — G44.229 CHRONIC TENSION-TYPE HEADACHE, NOT INTRACTABLE: ICD-10-CM

## 2019-03-04 DIAGNOSIS — G93.2 IDIOPATHIC INTRACRANIAL HYPERTENSION: ICD-10-CM

## 2019-03-04 DIAGNOSIS — H53.40 VISUAL FIELD DEFECT: Primary | ICD-10-CM

## 2019-03-04 DIAGNOSIS — G43.719 INTRACTABLE CHRONIC MIGRAINE WITHOUT AURA AND WITHOUT STATUS MIGRAINOSUS: ICD-10-CM

## 2019-03-04 PROBLEM — R51.9 ACUTE HEADACHE: Status: RESOLVED | Noted: 2019-02-28 | Resolved: 2019-03-04

## 2019-03-04 PROBLEM — G89.29 CHRONIC HEADACHE: Status: RESOLVED | Noted: 2019-02-28 | Resolved: 2019-03-04

## 2019-03-04 PROBLEM — R51.9 CHRONIC HEADACHE: Status: RESOLVED | Noted: 2019-02-28 | Resolved: 2019-03-04

## 2019-03-04 PROCEDURE — 99205 OFFICE O/P NEW HI 60 MIN: CPT | Performed by: PSYCHIATRY & NEUROLOGY

## 2019-03-04 RX ORDER — PROCHLORPERAZINE MALEATE 10 MG
TABLET ORAL
Qty: 20 TABLET | Refills: 3 | Status: SHIPPED | OUTPATIENT
Start: 2019-03-04 | End: 2021-01-06 | Stop reason: SDUPTHER

## 2019-03-04 RX ORDER — RIBOFLAVIN (VITAMIN B2) 100 MG
TABLET ORAL
Qty: 120 TABLET | Refills: 6 | Status: SHIPPED | OUTPATIENT
Start: 2019-03-04 | End: 2019-11-26

## 2019-03-04 RX ORDER — TOPIRAMATE 100 MG/1
TABLET, FILM COATED ORAL
Qty: 30 TABLET | Refills: 2 | Status: SHIPPED | OUTPATIENT
Start: 2019-03-04 | End: 2019-06-29 | Stop reason: SDUPTHER

## 2019-03-04 RX ORDER — OMEPRAZOLE 20 MG/1
20 CAPSULE, DELAYED RELEASE ORAL DAILY
COMMUNITY
End: 2019-03-22 | Stop reason: SDUPTHER

## 2019-03-04 RX ORDER — TOPIRAMATE 25 MG/1
TABLET ORAL
Qty: 120 TABLET | Refills: 0 | Status: SHIPPED | OUTPATIENT
Start: 2019-03-04 | End: 2019-10-04 | Stop reason: ALTCHOICE

## 2019-03-04 NOTE — PROGRESS NOTES
[address] [facility]  PATIENT:  Callum Maldonado  MRN:  [id number]  :  1985  DATE OF SERVICE:  3/4/2019      Assessment/Plan:      Problem List Items Addressed This Visit        Cardiovascular and Mediastinum    Idiopathic intracranial hypertension    Intractable chronic migraine without aura and without status migrainosus    Relevant Medications    topiramate (TOPAMAX) 25 mg tablet    prochlorperazine (COMPAZINE) 10 mg tablet    topiramate (TOPAMAX) 100 mg tablet    Riboflavin (B-2) 100 MG TABS    magnesium oxide (MAG-OX) 400 mg    Other Relevant Orders    Vitamin B12    TSH, 3rd generation       Nervous and Auditory    Chronic tension-type headache, not intractable    Relevant Medications    topiramate (TOPAMAX) 25 mg tablet    topiramate (TOPAMAX) 100 mg tablet    Riboflavin (B-2) 100 MG TABS    magnesium oxide (MAG-OX) 400 mg       Other    Vitamin D deficiency    Relevant Orders    Vitamin D 25 hydroxy    Visual field defect - Primary          Diagnosis: Suspect patient has  - tension-type headaches/sinus headaches  - chronic migraine headaches without aura    Preventive therapy for headaches:   -Over-the-counter supplements: to decrease intensity and frequency of migraines  - Magnesium Oxide 400mg a day  If any diarrhea or upset stomach, decrease dose  as tolerated  -  B2 200 mg twice a day  This supplement will change the color of the urine to fluorescent yellow no matter how hydrated, which is normal    - Topamax 25 mg increase up slowly to 100 mg at bedtime  If patient tolerates Topamax continue increasing  We using this for chronic migraine headache and for weight loss  Abortive therapy for headaches:   - at the onset of a migraine headache patient is to take Compazine 10 mg may repeat in 8 hours if needed      Medication overuse headaches:   - all analgesics have the potential to cause medication overuse headache [facility]) and analgesic overuse can negate the effectiveness of headache preventive measures  After successful [address] treatment, preventive medications for an underlying primary headache disorder have a greater chance for success  Avoid medications with narcotics, barbiturates, or caffeine in them as these can cause rebound headaches after very few doses and can interfere with other headache medicine efficacy  Taking any analgesics for more than 2-3 days a week can cause medication overuse headache  Headache management instructions  - When patient has a moderate to severe headache, they should seek rest, initiate relaxation and apply cold compresses to the head  - Maintain regular sleep schedule  Adults need at least 7-8 hours of uninterrupted a night  - Limit over the counter medications such as Tylenol, Ibuprofen, Aleve, Excedrin  (No more than 3 times a week)  - Maintain headache diary  We discussed an EZE for a smart phone is "Migraine oz"  - Limit caffeine to 1-2 cups 8 to 16 oz a day or less  - Avoid dietary trigger  (aged cheese, peanuts, MSG, aspartame and nitrates)  - Patient is to have regular frequent meals to prevent headache onset  - Please drink at least 64 ounces of water a day to help remain hydrated  Please call with any questions or concerns  Office number is [phone]      History of Present Illness: We had the pleasure of evaluating Callum Maldonado in neurological consultation today for headaches  As you know,  she is a 28 y o  right handed  female  Patient currently lives with her friend  She is here today for evaluation of her 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  States that she did not work for 10 years and now is working part-time as a  provider  Is on disability at this time for her underlying mood disorders      Anxiety:  Patient has longstanding history of anxiety and was diagnosed with bipolar disorder in the past   Patient states that is a missed diagnosis as she was recently given the diagnosis of obsessive-compulsive disorder  She overall feels that her underlying psychiatric problems are well controlled  She is on the right medication at this time  Did complete Transcranial Magnetic Stimulation which significantly helped her underlying mood disorders  - regularly sees a therapist and a psychiatrist      Weight:   Patient states that she has always been overweight  In 2006 she lost about 135 lb as she was trying to lose weight but ended up becoming anorexic  Following that she was hospitalized for the eating disorder and later ended up gaining about 200 lb  Has had difficulty losing weight since  She has seen weight management therapist and sees one now at this time  She seems well educated with the diagnosis of idiopathic intracranial hypertension and knows that weight loss is one of the treatments  Any family history of migraines? No  Any family history of aneurysms? No    Idiopathic intracranial hypertension:  She is given the diagnosis back when she was 11years of age with opening pressure of 42 mm of water  Was on Diamox at that time  Her recent lumbar puncture showed an opening pressure of 17  Not documented in the note this number is per patient  Ophthalmology-  - sees an ophthalmologist is in her she who did inform the patient that she has left optic nerve swelling only  - given patient's insurance she was not able to see [facility]  Chronic daily headaches/chronic migraine headaches:  What is your current pain level - 3/10    Headaches started at what age? 8years old but when she was 14 they got much worse  When she was 15 she was given the diagnosis of IIH and LP done then and had OP of 42 mm of water  She states she was put on diamox and then symptoms resolved  She states back in fall of 2018 they started again and then started to have loss of vision on the left side when she moved quickly or when she bent over   She was seen by ophthalmology and told that she had optic nerve swelling on the left but not right  How often do the headaches occur? Mild headaches: there all the time  Severe headaches: Now for an hour or two or at time for few days    What time of the day do the headaches start? Mild headache: in the afternoon  Severe headaches: anytime of the day, even can wake up with this    How long do the headaches last?   Mild headache: for 2-3 hours  Severe headache: few hours to few days    Are you ever headache free? Yes for hours in the day    Aura and how long does it last - none    Describe your usual headache -   Mild headaches: pressure behind her eyes and frontal region  Severe headaches: Throbbing, Pounding, Pressure,     Where is your headache located? Mild and severe: frontal and orbital region    What is the intensity of pain? Mild headaches: 3-4/10  Severe headache: 7-8/10    Associated symptoms:   - Decrease of appetite  - Photophobia, phonophobia, sensitivity to smell   - Blind spot in her vision  - Tinnitus  - light-headed or dizzy, stiff or sore neck,   - prefer to be alone and in a dark room, unable to work    Number of days missed per month because of headaches:  Work (or school) days: a lot per pt - she just went back to work after not working for 10 years  Social or Family activities: often    Headache are worse if the patient: cough, sneeze, bending over, may make the vision worse as well  Headache triggers:  Cold weather, Lack of sleep  What time of the year do headaches occur more frequently? None    Have you seen someone else for headaches or pain? Yes  Have you had trigger point injection performed and how often? No  Have you had Botox injection performed and how often? No   Have you had epidural injections or transforaminal injections performed? No    What medications do you take or have you taken for your headaches?    PREVENTATIVE:  - magnesium, B2, vitamin-D  - Claritin  - lisinopril  - Wellbutrin, Zoloft, haloperidol, lithium, Latuda, Cogentin, Risperdal  - Topamax, gabapentin, Lamictal  ABORTIVE:  - Toradol, ibuprofen, diclofenac  - hydrocodone  - Ativan    Neck pain   When did the neck pain start? More recently  Does your neck pain cause you to have headaches? no      Sleep Habit  Is your sleep restful? no  What time do you go to bed at night? 9 and does fall a sleep  What time do you wake up in am? 5 to 5:30  How often do you get up at night? 2-3 times  Do you wake up with headaches? Can   Do you snore while asleep? no  Have you been told that you stop breathing during sleeping?    no  Do you wake up tired in the morning? yes  Do you take frequent naps during the day? sometimes  Do you have jaw pain? no  Do you grind/clench your teeth at night? yes  Do you have restless leg syndrome? yes  Do you have nightmare or sleep walk? Not usually    Alternative therapies used in the past for headaches? no other headache interventions have been tried  Have you used CBD or THC for your headaches and how often? No  Are you current pregnant or planning on getting pregnant? No  Have you ever had any Brain imaging? yes   I personally reviewed these images      Past Medical History:   Diagnosis Date    Anorexia nervosa in remission     Anxiety     Back pain     [facility]     Depression     Esophageal reflux 8/15/2013    Hypertension     Hypothyroid     Idiopathic intracranial hypertension     Lumbar degenerative disc disease 5/8/2014    Obesity     Obsessive-compulsive disorder     Overactive bladder     Psychiatric disorder     Restless leg syndrome, controlled     Seasonal allergies     Suicide and self-inflicted injury [facility])        Patient Active Problem List   Diagnosis    Obsessive-compulsive disorder    Hypertension    Hypothyroidism    Overactive bladder    Allergic rhinitis    Binge-eating disorder, severe    Gambling disorder, moderate    Severe bipolar I disorder, most recent episode depressed ([facility] )    Encntr for gyn exam (general) (routine) w abnormal findings    Vaginal relaxation    Routine screening for STI (sexually transmitted infection)    BMI 50 0-59 9, adult ([facility] )    Dysfunction of both eustachian tubes    Amenorrhea    Bulging lumbar disc    Esophageal reflux    EILEEN (generalized anxiety disorder)    Lumbar degenerative disc disease    Morbid obesity ([facility] )    Nocturnal enuresis    PTSD (post-traumatic stress disorder)    RLS (restless legs syndrome)    Urgency incontinence    Vitamin D deficiency    Chronic midline low back pain without sciatica    Irregular bleeding    Iliotibial band syndrome of left side    Piriformis syndrome of left side    Skin lesion    Multiple nevi    Bipolar II disorder (HCC)    Recurrent sinusitis    Impaired fasting glucose    Breast nodule    Family history of cancer    Idiopathic intracranial hypertension    Visual field defect    Intractable chronic migraine without aura and without status migrainosus    Chronic tension-type headache, not intractable       Medications:      Current Outpatient Medications   Medication Sig Dispense Refill    buPROPion (WELLBUTRIN XL) 300 mg 24 hr tablet       Cholecalciferol (VITAMIN D3) 5000 units CAPS Take 5,000 Units by mouth daily      clomiPRAMINE (ANAFRANIL) 50 mg capsule Take 100 mg by mouth 2 (two) times a day      gabapentin (NEURONTIN) 600 MG tablet TAKE 2 TABLETS BY MOUTH AT BEDTIME (Patient taking differently: take 1/2 tablet in the morning and 1 1/2 tablets by mouth at bedtime) 60 tablet 2    HYDROcodone-acetaminophen (NORCO) 5-325 mg per tablet Take 1 tablet by mouth 2 (two) times a day as needed for pain (severe pain)      levothyroxine 100 mcg tablet TAKE 1 TABLET (100 MCG TOTAL) BY MOUTH DAILY IN THE EARLY MORNING FOR 30 DAYS 30 tablet 2    LORazepam (ATIVAN) 1 mg tablet Take 1 mg by mouth 3 (three) times a day as needed for anxiety      lurasidone (LATUDA) 40 mg tablet Take 20 mg by mouth daily with breakfast       meloxicam (MOBIC) 7 5 mg tablet TAKE 1 AFTER BREAKFAST AND DINNER DAILY AS NEEDED FOR PAIN AND SCIATICA  60 tablet 0    montelukast (SINGULAIR) 10 mg tablet TAKE 1 TABLET (10 MG TOTAL) BY MOUTH DAILY AT BEDTIME 30 tablet 1    Multiple Vitamin (MULTIVITAMIN) capsule Take 1 capsule by mouth daily      omeprazole (PriLOSEC) 20 mg delayed release capsule Take 20 mg by mouth daily      RESTASIS 0 05 % ophthalmic emulsion PLACE 1 DROP INTO BOTH EYES TWICE A DAY  3    rOPINIRole (REQUIP) 4 mg tablet Take 1 tab  twice daily 60 tablet 5    sertraline (ZOLOFT) 100 mg tablet Take 200 mg by mouth daily  0    fluticasone (FLONASE) 50 mcg/act nasal spray 1 spray daily  1    magnesium oxide (MAG-OX) 400 mg One in am daily 30 tablet 6    omeprazole (PriLOSEC) 20 mg delayed release capsule TAKE 1 CAPSULE (20 MG TOTAL) BY MOUTH DAILY IN THE EARLY MORNING FOR 30 DAYS 90 capsule 1    prochlorperazine (COMPAZINE) 10 mg tablet 1 tab at onset of migraine, can repeat in 8 hours, can take with triptan/NSAID 20 tablet 3    Riboflavin (B-2) 100 MG TABS Two in am and two at bedtime 120 tablet 6    topiramate (TOPAMAX) 100 mg tablet 1 tab at bedtime daily 30 tablet 2    topiramate (TOPAMAX) 25 mg tablet 1 at bedtime for 3 days then 2 at bedtime for 3 days then 3 at bedtime for 3 days then for bedtime after that 120 tablet 0     No current facility-administered medications for this visit  Allergies:       Allergies   Allergen Reactions    Other      Seasonal allergies        Family History:     Family History   Problem Relation Age of Onset    Depression Mother     Suicide Attempts Mother     Hypertension Mother     Diabetes Mother     Other Mother         bicuspid aortic valve    Hypertension Father     Hypothyroidism Father     Mental illness Father     Thyroid disease Father     Hypertension Brother     Cancer Maternal Grandmother     Heart disease Maternal Grandmother     Stroke Maternal Grandmother     Breast cancer Maternal Grandmother     Skin cancer Maternal Grandmother     Pneumonia Maternal Grandfather     Cancer Maternal Grandfather     Cancer Paternal Grandmother     Pneumonia Paternal Grandfather     Arthritis Family     Breast cancer Family     Osteopenia Family     Osteoporosis Family     Hypertension Family     Transient ischemic attack Family        Social History:     Social History     Socioeconomic History    Marital status: Single     Spouse name: Not on file    Number of children: Not on file    Years of education: Not on file    Highest education level: Not on file   Occupational History    Not on file   Social Needs    Financial resource strain: Not on file    Food insecurity:     Worry: Not on file     Inability: Not on file    Transportation needs:     Medical: Not on file     Non-medical: Not on file   Tobacco Use    Smoking status: Never Smoker    Smokeless tobacco: Never Used   Substance and Sexual Activity    Alcohol use: No    Drug use: No    Sexual activity: Not Currently     Partners: Female   Lifestyle    Physical activity:     Days per week: Not on file     Minutes per session: Not on file    Stress: Not on file   Relationships    Social connections:     Talks on phone: Not on file     Gets together: Not on file     Attends Hinduism service: Not on file     Active member of club or organization: Not on file     Attends meetings of clubs or organizations: Not on file     Relationship status: Not on file    Intimate partner violence:     Fear of current or ex partner: Not on file     Emotionally abused: Not on file     Physically abused: Not on file     Forced sexual activity: Not on file   Other Topics Concern    Not on file   Social History Narrative    Always uses seat belts    Caffeine use     Disabled - permament disability since 2008 due to psychiatric illness    Has no children    Single     Tea         Objective: Physical Exam:                                                                   Vitals:               /78 (BP Location: Left arm, Patient Position: Sitting, Cuff Size: Extra-Large)   Pulse 72   Ht 5' 7" (1 702 m)   Wt (!) 181 kg (398 lb 1 6 oz)   BMI 62 35 kg/m²   BP Readings from Last 3 Encounters:   03/04/19 130/78   02/28/19 134/82   02/26/19 117/64     Pulse Readings from Last 3 Encounters:   03/04/19 72   02/28/19 80   02/26/19 74                CONSTITUTIONAL: Overweight     Eyes:  PERRLA, EOM normal      Neck:  Normal ROM, neck supple  HEENT:  Normocephalic atraumatic  No meningismus  Oropharynx is clear and moist  No oral mucosal lesions  Chest:  Respirations regular and unlabored  Cardiovascular:  Distal extremities warm without palpable edema or tenderness, no observed significant swelling  Musculoskeletal:  Full range of motion  Skin:  warm and dry   Psychiatric:  Normal behavior and appropriate affect        Neurological Examination:   Mental status/cognitive function: Orientated to time, place and person  Cranial Nerves: 2 to 12 intact  Motor Exam:  5/5 upper and lower extremity  Sensory: grossly intact light touch in all extremities  Reflexes: 2/4 throughout  Coordination: Finger nose finger intact bilaterally, no tremor noted  Gait: steady casual, Romberg's is negative       Review of Systems:   Review of Systems   Constitutional: Positive for fatigue and unexpected weight change  HENT: Positive for ear pain, postnasal drip, sinus pressure, sinus pain and sore throat  Eyes: Negative  Respiratory: Negative  Cardiovascular: Negative  Gastrointestinal: Negative  Endocrine: Negative  Genitourinary: Negative  Musculoskeletal: Positive for back pain  Skin: Negative  Allergic/Immunologic: Positive for environmental allergies  Neurological: Positive for headaches  Hematological: Negative      Psychiatric/Behavioral: Positive for sleep disturbance  The patient is nervous/anxious  I personally reviewed and updated the ROS that was entered by the medical assistant       I have spent 60 minutes with Patient  today in which greater than 50% of this time was spent in counseling/coordination of care regarding Diagnostic results, Prognosis, Risks and benefits of tx options, Intructions for management, Patient and family education, Importance of tx compliance, Risk factor reductions, Impressions and Plan of care as above        Author:  Chasity Armstrong MD 3/4/2019 9:14 AM

## 2019-03-04 NOTE — PATIENT INSTRUCTIONS
Diagnosis: Suspect patient has  - tension-type headaches/sinus headaches  - chronic migraine headaches without aura    Preventive therapy for headaches:   -Over-the-counter supplements: to decrease intensity and frequency of migraines  - Magnesium Oxide 400mg a day  If any diarrhea or upset stomach, decrease dose  as tolerated  -  B2 200 mg twice a day  This supplement will change the color of the urine to fluorescent yellow no matter how hydrated, which is normal    - Topamax 25 mg increase up slowly to 100 mg at bedtime  If patient tolerates Topamax continue increasing  Abortive therapy for headaches:   - at the onset of a migraine headache patient is to take Compazine 10 mg may repeat in 8 hours if needed  Medication overuse headaches:   - all analgesics have the potential to cause medication overuse headache [facility]) and analgesic overuse can negate the effectiveness of headache preventive measures  After successful [address] treatment, preventive medications for an underlying primary headache disorder have a greater chance for success  Avoid medications with narcotics, barbiturates, or caffeine in them as these can cause rebound headaches after very few doses and can interfere with other headache medicine efficacy  Taking any analgesics for more than 2-3 days a week can cause medication overuse headache  Headache management instructions  - When patient has a moderate to severe headache, they should seek rest, initiate relaxation and apply cold compresses to the head  - Maintain regular sleep schedule  Adults need at least 7-8 hours of uninterrupted a night  - Limit over the counter medications such as Tylenol, Ibuprofen, Aleve, Excedrin  (No more than 3 times a week)  - Maintain headache diary  We discussed an EZE for a smart phone is "Migraine oz"  - Limit caffeine to 1-2 cups 8 to 16 oz a day or less  - Avoid dietary trigger  (aged cheese, peanuts, MSG, aspartame and nitrates)    - Patient is to have regular frequent meals to prevent headache onset  - Please drink at least 64 ounces of water a day to help remain hydrated  Please call with any questions or concerns   Office number is [phone]

## 2019-03-05 ENCOUNTER — APPOINTMENT (OUTPATIENT)
Dept: LAB | Facility: HOSPITAL | Age: 34
End: 2019-03-05
Attending: PSYCHIATRY & NEUROLOGY
Payer: MEDICARE

## 2019-03-05 ENCOUNTER — PROCEDURE VISIT (OUTPATIENT)
Dept: UROLOGY | Facility: AMBULATORY SURGERY CENTER | Age: 34
End: 2019-03-05
Payer: MEDICARE

## 2019-03-05 ENCOUNTER — TELEPHONE (OUTPATIENT)
Dept: NEUROLOGY | Facility: CLINIC | Age: 34
End: 2019-03-05

## 2019-03-05 VITALS
DIASTOLIC BLOOD PRESSURE: 74 MMHG | HEIGHT: 67 IN | SYSTOLIC BLOOD PRESSURE: 130 MMHG | HEART RATE: 76 BPM | BODY MASS INDEX: 62.35 KG/M2

## 2019-03-05 DIAGNOSIS — R39.15 URINARY URGENCY: ICD-10-CM

## 2019-03-05 DIAGNOSIS — N39.41 URGE INCONTINENCE: ICD-10-CM

## 2019-03-05 DIAGNOSIS — R35.0 FREQUENCY OF URINATION: ICD-10-CM

## 2019-03-05 DIAGNOSIS — G43.719 INTRACTABLE CHRONIC MIGRAINE WITHOUT AURA AND WITHOUT STATUS MIGRAINOSUS: ICD-10-CM

## 2019-03-05 DIAGNOSIS — E55.9 VITAMIN D DEFICIENCY: ICD-10-CM

## 2019-03-05 DIAGNOSIS — R35.0 URINARY FREQUENCY: Primary | ICD-10-CM

## 2019-03-05 PROCEDURE — 64566 NEUROELTRD STIM POST TIBIAL: CPT

## 2019-03-05 PROCEDURE — 82306 VITAMIN D 25 HYDROXY: CPT

## 2019-03-05 PROCEDURE — 82607 VITAMIN B-12: CPT

## 2019-03-05 PROCEDURE — 84443 ASSAY THYROID STIM HORMONE: CPT

## 2019-03-05 PROCEDURE — 36415 COLL VENOUS BLD VENIPUNCTURE: CPT

## 2019-03-05 NOTE — TELEPHONE ENCOUNTER
Pt states she has an LP scheduled 3/13 which was ordered by neuro ophthalmologist in [place]  States her last LP's opening pressure "was not that high and I don't know if I need to go again since Dr Mina Boston wanted me to see a local neuro ophthalmologist " Please advise  Pt cell [phone]  Ok to leave detailed message

## 2019-03-05 NOTE — PROGRESS NOTES
3/5/2019    Grzegorz Vera  1985  [id number]    Diagnosis  Chief Complaint     Urinary Urgency; Urinary Incontinence; Urinary Frequency         Patient presents for PTNS 20 of 24 managed by Dr Zana Flower  Patient will follow up for next scheduled PTNS appointment         Posterior Tibial Nerve Stimulation     Date/Time 3/5/2019 9:30 AM     Performed by  Garrison Munoz RN     Authorized by Manny Richards MD                Procedure PTNS  Session 20 of 24    Ankle used: right  Treatment setting:10  Duration of treatment: 30 minutes  Lead lot #:[id number]  Expiration Date:6/30/21  Feeling/Response: medial to bottom of foot    Patient Goals and Progress  Decreased Urgency Yes  Decreased Frequency Yes  Episodes of incontinence Yes  Sleep Through Night No  Related Health and Social Factors Yes    Bladder Diary Summary:  Caffeine cups per day:2  Alcohol- number of drinks per day:0  Daytime voids: 6-8  Nighttime voids: 2 or 3 (used to be 6-8)  Urgency:improved   Incontinence- episodes per day:0            Garrison Munoz RN

## 2019-03-06 LAB
25(OH)D3 SERPL-MCNC: 44.2 NG/ML (ref 30–100)
TSH SERPL DL<=0.05 MIU/L-ACNC: 2.54 UIU/ML (ref 0.36–3.74)
VIT B12 SERPL-MCNC: 627 PG/ML (ref 100–900)

## 2019-03-19 NOTE — PSYCH
Progress Note  Psychotherapy Provided [facility]: Group Therapy provided today  Goals addressed in session:   1 D: The above was seen for Group Therapy session  Group members engaged in a therapeutic activity / discussion re: anxiety and the ways that it impacts their lives and their recovery efforts  A:Elizabeth spoke about her social anxiety and her fears of vomiting  She was able to share and to also be supportive of other group members today  P: She will be encouraged to continue to attend individual therapy weekly and the Adult ED group on a biweekly basis  Pain Scale and Suicide Risk [facility]: On a scale of 0 to 10, the patient rates current pain at 4   Current suicide risk is low   Behavioral Health Treatment Plan [facility]: Diagnosis and Treatment Plan explained to patient, patient relates understanding diagnosis and is agreeable to Treatment Plan  Assessment    1   Binge-eating disorder, severe (303 8) (A30 58)    Signatures   Electronically signed by : Asha Ball LCSW; Aug 31 2017  2:23PM EST                       (Author) Pearl mcgill, 62 yo F h/o Severe  Cardiomyopathy, DM, Thyroid cancer CHF on cont. milrinone infusion via PICC, EF of 10% per records, afib on asa81/elliquis, PPM, HTN, DM, coming in after a mechanical slip and fall down 14 steps.  + hit head, no LOC.  Was able to get up and walked to the couch with assistance form her nurse.  No HA/n/v.  CTH showed very thin r sdh.      PLAN: hold ac/antiplt, ddavp/kcentra,  CTH interval scan, trauma eval, medicine consult for cardiac issues, keppra, fu outpatient 1-2 weeks

## 2019-03-22 DIAGNOSIS — K21.9 GASTROESOPHAGEAL REFLUX DISEASE WITHOUT ESOPHAGITIS: ICD-10-CM

## 2019-03-22 RX ORDER — OMEPRAZOLE 20 MG/1
20 CAPSULE, DELAYED RELEASE ORAL
Qty: 90 CAPSULE | Refills: 2 | Status: SHIPPED | OUTPATIENT
Start: 2019-03-22 | End: 2019-12-04 | Stop reason: SDUPTHER

## 2019-03-22 NOTE — TELEPHONE ENCOUNTER
Patient is requesting a refill of omeprazole (PriLOSEC) 20 mg, take 1 capsule daily, 90-day supply to [address] [facility]  This is the fist time our office is refilling it  Patient can be contacted at [phone] with any questions

## 2019-03-25 ENCOUNTER — OFFICE VISIT (OUTPATIENT)
Dept: FAMILY MEDICINE CLINIC | Facility: CLINIC | Age: 34
End: 2019-03-25
Payer: MEDICARE

## 2019-03-25 VITALS
HEART RATE: 80 BPM | RESPIRATION RATE: 16 BRPM | OXYGEN SATURATION: 98 % | TEMPERATURE: 97.6 F | SYSTOLIC BLOOD PRESSURE: 124 MMHG | DIASTOLIC BLOOD PRESSURE: 80 MMHG

## 2019-03-25 DIAGNOSIS — J20.9 ACUTE BRONCHITIS, UNSPECIFIED ORGANISM: Primary | ICD-10-CM

## 2019-03-25 PROCEDURE — 99213 OFFICE O/P EST LOW 20 MIN: CPT | Performed by: NURSE PRACTITIONER

## 2019-03-25 RX ORDER — ALBUTEROL SULFATE 90 UG/1
2 AEROSOL, METERED RESPIRATORY (INHALATION) EVERY 6 HOURS PRN
Qty: 1 INHALER | Refills: 1 | Status: SHIPPED | OUTPATIENT
Start: 2019-03-25 | End: 2019-10-04 | Stop reason: ALTCHOICE

## 2019-03-25 NOTE — PROGRESS NOTES
Chief Complaint   Patient presents with    Cough     tighness in chest when breathing,     Assessment/Plan:    Acute bronchitis- May use Ventolin rescue inhaler 1-2 puffs Q4-6 hours prn  We reviewed how to properly use this today  OTC Mucinex  mg 1-2 tabs BID prn  Warm, salt water gargles, throat lozenges  Saline nasal spray prn  Stay well hydrated, increase PO fluids and rest   Lungs are CTA B/L, pulse ox at 98% so we'll hold off on PO Prednisone  I don't feel she would benefit from an antibiotic at this time but she knows to call our office if symptoms worsen or persist      Diagnoses and all orders for this visit:    Acute bronchitis, unspecified organism  -     albuterol (VENTOLIN HFA) 90 mcg/act inhaler; Inhale 2 puffs every 6 (six) hours as needed for wheezing          Subjective:      Patient ID: Babak Lorenzo is a 28 y o  female  HPI     Pt presents by herself today for an visit  C/o a dry cough (feels like she can't produce her phlegm) for the past 3 days   Some wheezing with coughing, but no SOB   No fevers, chills, body aches  No rhinorrhea, nasal congestion, sinus pressure, ear pain  No N/V/D, abdominal pain     Denies H/o asthma   Non smoker     Pt follows with ENT, Dr Salbador Polk, for chronic ethmoidal sinusitis, last seen 3/19/19   Sinuses have been rather good lately     The following portions of the patient's history were reviewed and updated as appropriate: allergies, current medications, past medical history, past social history and problem list     Review of Systems   Constitutional: Negative for chills, fatigue and fever  HENT: Positive for postnasal drip  Negative for congestion, ear pain, hearing loss, rhinorrhea, sinus pressure, sinus pain, sneezing, sore throat, tinnitus, trouble swallowing and voice change  Eyes: Negative for visual disturbance  Respiratory: Positive for choking  Negative for apnea, cough, shortness of breath and wheezing      Cardiovascular: Negative for chest pain, palpitations and leg swelling  Gastrointestinal: Negative for abdominal pain, blood in stool, constipation, diarrhea and nausea  Genitourinary: Negative for dysuria  Musculoskeletal: Negative for arthralgias and myalgias  Skin: Negative for rash and wound  Neurological: Negative for dizziness, weakness, numbness and headaches  Hematological: Negative for adenopathy  Objective:      /80   Pulse 80   Temp 97 6 °F (36 4 °C) (Tympanic)   Resp 16   SpO2 98%          Physical Exam   Constitutional: She is oriented to person, place, and time  She appears well-developed and well-nourished  No distress  HENT:   Head: Normocephalic and atraumatic  Right Ear: Hearing, tympanic membrane, external ear and ear canal normal    Left Ear: Hearing, tympanic membrane, external ear and ear canal normal    Nose: Nose normal  Right sinus exhibits no maxillary sinus tenderness and no frontal sinus tenderness  Left sinus exhibits no maxillary sinus tenderness and no frontal sinus tenderness  Mouth/Throat: Mucous membranes are normal  Posterior oropharyngeal erythema (PND) present  No oropharyngeal exudate or posterior oropharyngeal edema  Eyes: Pupils are equal, round, and reactive to light  Conjunctivae are normal    Neck: Normal range of motion  Neck supple  No thyromegaly present  Cardiovascular: Normal rate, regular rhythm and normal heart sounds  No murmur heard  Pulmonary/Chest: Effort normal and breath sounds normal  No accessory muscle usage or stridor  No respiratory distress  She has no decreased breath sounds  She has no wheezes  She has no rhonchi  She has no rales  Abdominal: Soft  Bowel sounds are normal  She exhibits no distension  There is no tenderness  Musculoskeletal: Normal range of motion  Lymphadenopathy:     She has no cervical adenopathy  Neurological: She is alert and oriented to person, place, and time  Skin: Skin is warm and dry   She is not diaphoretic  Psychiatric: She has a normal mood and affect

## 2019-03-25 NOTE — LETTER
March 25, 2019     Patient: Keya Dukes   YOB: 1985   Date of Visit: 3/25/2019       To Whom it May Concern:    Genet Carpio is under my professional care  She was seen in my office on 3/25/2019  She may return to work on 3/27/19  If you have any questions or concerns, please don't hesitate to call           Sincerely,          LEIGHA Lopes        CC: No Recipients

## 2019-03-28 ENCOUNTER — TELEPHONE (OUTPATIENT)
Dept: UROLOGY | Facility: MEDICAL CENTER | Age: 34
End: 2019-03-28

## 2019-03-28 ENCOUNTER — OFFICE VISIT (OUTPATIENT)
Dept: FAMILY MEDICINE CLINIC | Facility: CLINIC | Age: 34
End: 2019-03-28
Payer: MEDICARE

## 2019-03-28 VITALS
DIASTOLIC BLOOD PRESSURE: 68 MMHG | HEART RATE: 84 BPM | OXYGEN SATURATION: 98 % | TEMPERATURE: 98.2 F | RESPIRATION RATE: 16 BRPM | SYSTOLIC BLOOD PRESSURE: 132 MMHG

## 2019-03-28 DIAGNOSIS — J01.01 ACUTE RECURRENT MAXILLARY SINUSITIS: Primary | ICD-10-CM

## 2019-03-28 PROCEDURE — 99213 OFFICE O/P EST LOW 20 MIN: CPT | Performed by: NURSE PRACTITIONER

## 2019-03-28 RX ORDER — AMOXICILLIN AND CLAVULANATE POTASSIUM 875; 125 MG/1; MG/1
1 TABLET, FILM COATED ORAL EVERY 12 HOURS SCHEDULED
Qty: 14 TABLET | Refills: 0 | Status: SHIPPED | OUTPATIENT
Start: 2019-03-28 | End: 2019-04-04

## 2019-03-28 NOTE — PROGRESS NOTES
Chief Complaint   Patient presents with    URI     just not getting better     Assessment/Plan:    Acute recurrent sinusitis- to start Augmentin as ordered  SE reviewed, recommended an OTC Probiotic with this  May continue OTC Mucinex prn  Pt refuses nasal sprays due to migraines  Cough is manageable at this time per pt  Increase PO fluids and rest   Warm, salt water gargles, throat lozenges  OTC Tylenol prn, max of 3g/24 hours  Humidifier in bedroom  Follow up with ENT as scheduled for recurrent sinusitis  Call office if symptoms worsen or persist      Diagnoses and all orders for this visit:    Acute recurrent maxillary sinusitis  -     amoxicillin-clavulanate (AUGMENTIN) 875-125 mg per tablet; Take 1 tablet by mouth every 12 (twelve) hours for 7 days          Subjective:      Patient ID: Mu Sorto is a 28 y o  female  HPI     Pt presents by herself today for an acute visit   She was evaluated by myself earlier this week on 3/25/19 for acute bronchitis   Since then, she has been using her Ventolin rescue inhaler, Mucinex  Was not started on an abx at that visit     Feeling worse today, now c/o worsening head congestion and a productive cough   Dull headache with sinus pressure and nasal congestion   +PND  Sore throat on and off  Wheezing on and off but no SOB   No N/V/D, abdominal pain   Appetite a little decreased     Pt follows with ENT, Dr Carlos Alberto Guzman, for chronic ethmoidal sinusitis, last seen 3/19/19      The following portions of the patient's history were reviewed and updated as appropriate: allergies, current medications, past medical history, past social history and problem list     Review of Systems   Constitutional: Positive for fatigue  Negative for chills, fever and unexpected weight change  HENT: Positive for congestion, postnasal drip, rhinorrhea and sinus pressure  Negative for ear pain (ears feel blocked), hearing loss, sore throat, tinnitus, trouble swallowing and voice change  Eyes: Negative for visual disturbance  Respiratory: Positive for cough  Negative for chest tightness, shortness of breath and wheezing  Cardiovascular: Negative for chest pain, palpitations and leg swelling  Gastrointestinal: Negative for abdominal pain, blood in stool, constipation, diarrhea, nausea and vomiting  Genitourinary: Negative for dysuria  Musculoskeletal: Negative for arthralgias and myalgias  Skin: Negative for color change, pallor, rash and wound  Neurological: Negative for dizziness, weakness, numbness and headaches  Hematological: Negative for adenopathy  Psychiatric/Behavioral: Negative for sleep disturbance and suicidal ideas  Objective:      /68   Pulse 84   Temp 98 2 °F (36 8 °C) (Tympanic)   Resp 16   SpO2 98%          Physical Exam   Constitutional: She is oriented to person, place, and time  She appears well-developed and well-nourished  No distress  HENT:   Head: Normocephalic and atraumatic  Right Ear: Hearing, tympanic membrane, external ear and ear canal normal    Left Ear: Hearing, tympanic membrane, external ear and ear canal normal    Nose: Mucosal edema (erythematous) present  Right sinus exhibits maxillary sinus tenderness  Left sinus exhibits maxillary sinus tenderness  Mouth/Throat: Mucous membranes are normal  Posterior oropharyngeal erythema (PND) present  No oropharyngeal exudate or posterior oropharyngeal edema  Eyes: Pupils are equal, round, and reactive to light  Conjunctivae are normal    Neck: Normal range of motion  Neck supple  No thyromegaly present  Cardiovascular: Normal rate, regular rhythm and normal heart sounds  No murmur heard  Pulmonary/Chest: Effort normal and breath sounds normal  No accessory muscle usage or stridor  No respiratory distress  She has no decreased breath sounds  She has no wheezes  She has no rhonchi  She has no rales  Abdominal: Soft  Bowel sounds are normal  She exhibits no distension   There is no tenderness  Musculoskeletal: Normal range of motion  Lymphadenopathy:     She has no cervical adenopathy  Neurological: She is alert and oriented to person, place, and time  Skin: Skin is warm and dry  She is not diaphoretic  Psychiatric: She has a normal mood and affect

## 2019-03-28 NOTE — TELEPHONE ENCOUNTER
Called and left patient message to call office and R/S PTNS  Offered 4/3/2019 appointment  Patient can specify time

## 2019-03-28 NOTE — TELEPHONE ENCOUNTER
Offered 4/3/2019 appointment, but patient cannot come in on Wednesday  She wants to know if it's possible to come in on Thursday  She would be able to come in anytime  She can be reached at [phone]

## 2019-03-28 NOTE — LETTER
March 28, 2019     Patient: Yadira Simpson   YOB: 1985   Date of Visit: 3/28/2019       To Whom it May Concern:    Maddi Alexander is under my professional care  She was seen in my office on 3/28/2019  She may return to work on 4/1/19  If you have any questions or concerns, please don't hesitate to call           Sincerely,          LEIGHA Alexander        CC: No Recipients

## 2019-04-02 DIAGNOSIS — G25.81 RESTLESS LEG SYNDROME: ICD-10-CM

## 2019-04-04 ENCOUNTER — TELEPHONE (OUTPATIENT)
Dept: UROLOGY | Facility: MEDICAL CENTER | Age: 34
End: 2019-04-04

## 2019-04-04 DIAGNOSIS — G25.81 RESTLESS LEG SYNDROME: ICD-10-CM

## 2019-04-04 RX ORDER — GABAPENTIN 600 MG/1
TABLET ORAL
Qty: 60 TABLET | Refills: 2 | Status: SHIPPED | OUTPATIENT
Start: 2019-04-04 | End: 2019-10-04 | Stop reason: ALTCHOICE

## 2019-04-04 RX ORDER — GABAPENTIN 600 MG/1
TABLET ORAL
Qty: 180 TABLET | Refills: 3 | Status: SHIPPED | OUTPATIENT
Start: 2019-04-04 | End: 2020-01-31 | Stop reason: SDUPTHER

## 2019-04-22 NOTE — TELEPHONE ENCOUNTER
Spoke with her and gave her hopeline info and her maximilian flanagan risk assessment results  Cheiloplasty (Less Than 50%) Text: A decision was made to reconstruct the defect with a  cheiloplasty.  The defect was undermined extensively.  Additional obicularis oris muscle was excised with a 15 blade scalpel.  The defect was converted into a full thickness wedge, of less than 50% of the vertical height of the lip, to facilite a better cosmetic result.  Small vessels were then tied off with 5-0 monocyrl. The obicularis oris, superficial fascia, adipose and dermis were then reapproximated.  After the deeper layers were approximated the epidermis was reapproximated with particular care given to realign the vermillion border.

## 2019-05-22 DIAGNOSIS — E03.9 HYPOTHYROIDISM, UNSPECIFIED TYPE: ICD-10-CM

## 2019-05-22 RX ORDER — LEVOTHYROXINE SODIUM 0.1 MG/1
100 TABLET ORAL
Qty: 30 TABLET | Refills: 3 | Status: SHIPPED | OUTPATIENT
Start: 2019-05-22 | End: 2019-10-17 | Stop reason: SDUPTHER

## 2019-06-18 ENCOUNTER — PATIENT OUTREACH (OUTPATIENT)
Dept: CASE MANAGEMENT | Facility: OTHER | Age: 34
End: 2019-06-18

## 2019-06-29 DIAGNOSIS — G25.81 RESTLESS LEG SYNDROME: ICD-10-CM

## 2019-06-29 DIAGNOSIS — G43.719 INTRACTABLE CHRONIC MIGRAINE WITHOUT AURA AND WITHOUT STATUS MIGRAINOSUS: ICD-10-CM

## 2019-06-29 RX ORDER — ROPINIROLE 4 MG/1
TABLET, FILM COATED ORAL
Qty: 60 TABLET | Refills: 5 | Status: SHIPPED | OUTPATIENT
Start: 2019-06-29 | End: 2019-12-27 | Stop reason: SDUPTHER

## 2019-07-01 RX ORDER — TOPIRAMATE 100 MG/1
TABLET, FILM COATED ORAL
Qty: 30 TABLET | Refills: 1 | Status: SHIPPED | OUTPATIENT
Start: 2019-07-01 | End: 2019-09-02 | Stop reason: SDUPTHER

## 2019-07-10 DIAGNOSIS — Z71.89 COMPLEX CARE COORDINATION: Primary | ICD-10-CM

## 2019-07-31 ENCOUNTER — PATIENT OUTREACH (OUTPATIENT)
Dept: FAMILY MEDICINE CLINIC | Facility: CLINIC | Age: 34
End: 2019-07-31

## 2019-08-07 ENCOUNTER — PATIENT OUTREACH (OUTPATIENT)
Dept: FAMILY MEDICINE CLINIC | Facility: CLINIC | Age: 34
End: 2019-08-07

## 2019-08-07 NOTE — PROGRESS NOTES
Spoke with patient for [facility] SNP Assessment  Like generic version of Anafranil  It does not give her heartburn like some of the other medication  Looking for new PCP moved to [employer]

## 2019-09-02 DIAGNOSIS — G43.719 INTRACTABLE CHRONIC MIGRAINE WITHOUT AURA AND WITHOUT STATUS MIGRAINOSUS: ICD-10-CM

## 2019-09-03 RX ORDER — TOPIRAMATE 100 MG/1
TABLET, FILM COATED ORAL
Qty: 30 TABLET | Refills: 0 | Status: SHIPPED | OUTPATIENT
Start: 2019-09-03 | End: 2019-10-21 | Stop reason: SDUPTHER

## 2019-09-16 ENCOUNTER — HOSPITAL ENCOUNTER (EMERGENCY)
Facility: HOSPITAL | Age: 34
Discharge: HOME | End: 2019-09-16
Attending: EMERGENCY MEDICINE | Admitting: EMERGENCY MEDICINE
Payer: COMMERCIAL

## 2019-09-16 VITALS
DIASTOLIC BLOOD PRESSURE: 100 MMHG | RESPIRATION RATE: 16 BRPM | OXYGEN SATURATION: 100 % | BODY MASS INDEX: 45.99 KG/M2 | SYSTOLIC BLOOD PRESSURE: 164 MMHG | WEIGHT: 293 LBS | TEMPERATURE: 97.9 F | HEART RATE: 97 BPM | HEIGHT: 67 IN

## 2019-09-16 DIAGNOSIS — J40 BRONCHITIS: Primary | ICD-10-CM

## 2019-09-16 PROCEDURE — 99284 EMERGENCY DEPT VISIT MOD MDM: CPT | Performed by: PHYSICIAN ASSISTANT

## 2019-09-16 PROCEDURE — 99283 EMERGENCY DEPT VISIT LOW MDM: CPT

## 2019-09-16 RX ORDER — AZITHROMYCIN 250 MG/1
TABLET, FILM COATED ORAL
Qty: 6 TABLET | Refills: 0 | Status: SHIPPED | OUTPATIENT
Start: 2019-09-16 | End: 2019-09-20

## 2019-09-17 NOTE — ED PROVIDER NOTES
History  Chief Complaint   Patient presents with    Cough     went to urgent care last  and wednesday; urgent care stated she had fluid behind eardrums and sinuitis; patient stated it is unrelieved and they feel completely blocked; patient took steroid but it hasn't helped at all     Patient presents to the emergency department today for evaluation of ear pressure, nasal congestion, sore throat, cough  Cough is course in produces mucus  She has sweats however denies chills  No shortness of breath or chest pain  She is finishing a course of cefdinir and is also finishing Medrol Dosepak  Prior to Admission Medications   Prescriptions Last Dose Informant Patient Reported? Taking?    Cholecalciferol (VITAMIN D3) 5000 units CAPS  Self Yes No   Sig: Take 5,000 Units by mouth daily   HYDROcodone-acetaminophen (NORCO) 5-325 mg per tablet  Self Yes No   Sig: Take 1 tablet by mouth 2 (two) times a day as needed for pain (severe pain)   LORazepam (ATIVAN) 1 mg tablet  Self Yes No   Sig: Take 1 mg by mouth 3 (three) times a day as needed for anxiety   Multiple Vitamin (MULTIVITAMIN) capsule  Self Yes No   Sig: Take 1 capsule by mouth daily   RESTASIS 0 05 % ophthalmic emulsion  Self Yes No   Sig: PLACE 1 DROP INTO BOTH EYES TWICE A DAY   Riboflavin (B-2) 100 MG TABS  Self No No   Sig: Two in am and two at bedtime   albuterol (VENTOLIN HFA) 90 mcg/act inhaler  Self No No   Sig: Inhale 2 puffs every 6 (six) hours as needed for wheezing   buPROPion (WELLBUTRIN XL) 300 mg 24 hr tablet  Self Yes No   clomiPRAMINE (ANAFRANIL) 50 mg capsule  Self Yes No   Sig: Take 100 mg by mouth 2 (two) times a day   fluticasone (FLONASE) 50 mcg/act nasal spray  Self Yes No   Si spray daily   gabapentin (NEURONTIN) 600 MG tablet   No No   Sig: TAKE 2 TABLETS BY MOUTH AT BEDTIME   gabapentin (NEURONTIN) 600 MG tablet   No No   Sig: Take 0 5 tablets (300 mg total) by mouth daily in the early morning AND 1 5 tablets (900 mg total) daily at bedtime  Do all this for 90 days  levothyroxine 100 mcg tablet   No No   Sig: Take 1 tablet (100 mcg total) by mouth daily in the early morning for 30 days   lurasidone (LATUDA) 40 mg tablet  Self Yes No   Sig: Take 20 mg by mouth daily with breakfast    magnesium oxide (MAG-OX) 400 mg  Self No No   Sig: One in am daily   meloxicam (MOBIC) 7 5 mg tablet  Self No No   Sig: TAKE 1 AFTER BREAKFAST AND DINNER DAILY AS NEEDED FOR PAIN AND SCIATICA     montelukast (SINGULAIR) 10 mg tablet  Self No No   Sig: TAKE 1 TABLET (10 MG TOTAL) BY MOUTH DAILY AT BEDTIME   omeprazole (PriLOSEC) 20 mg delayed release capsule  Self No No   Sig: Take 1 capsule (20 mg total) by mouth daily in the early morning for 90 days   prochlorperazine (COMPAZINE) 10 mg tablet  Self No No   Si tab at onset of migraine, can repeat in 8 hours, can take with triptan/NSAID   rOPINIRole (REQUIP) 4 mg tablet   No No   Sig: TAKE 1 TABLET BY MOUTH TWICE A DAY   sertraline (ZOLOFT) 100 mg tablet  Self Yes No   Sig: Take 200 mg by mouth daily   topiramate (TOPAMAX) 100 mg tablet   No No   Sig: TAKE 1 TABLET BY MOUTH EVERYDAY AT BEDTIME   topiramate (TOPAMAX) 25 mg tablet  Self No No   Si at bedtime for 3 days then 2 at bedtime for 3 days then 3 at bedtime for 3 days then for bedtime after that      Facility-Administered Medications: None       Past Medical History:   Diagnosis Date    Anorexia nervosa in remission     Anxiety     Back pain     Bipolar disorder (HCC)     Depression     Esophageal reflux 8/15/2013    Hypertension     Hypothyroid     Idiopathic intracranial hypertension     Lumbar degenerative disc disease 2014    Obesity     Obsessive-compulsive disorder     Overactive bladder     Psychiatric disorder     Restless leg syndrome, controlled     Seasonal allergies     Suicide and self-inflicted injury ([facility] [address] )        Past Surgical History:   Procedure Laterality Date    DENTAL SURGERY      IR LUMBAR PUNCTURE  2/26/2019    SINUS ENDOSCOPY      TONSILLECTOMY         Family History   Problem Relation Age of Onset    Depression Mother     Suicide Attempts Mother     Hypertension Mother     Diabetes Mother     Other Mother         bicuspid aortic valve    Hypertension Father     Hypothyroidism Father     Mental illness Father     Thyroid disease Father     Hypertension Brother     Cancer Maternal Grandmother     Heart disease Maternal Grandmother     Stroke Maternal Grandmother     Breast cancer Maternal Grandmother     Skin cancer Maternal Grandmother     Pneumonia Maternal Grandfather     Cancer Maternal Grandfather     Cancer Paternal Grandmother     Pneumonia Paternal Grandfather     Arthritis Family     Breast cancer Family     Osteopenia Family     Osteoporosis Family     Hypertension Family     Transient ischemic attack Family      I have reviewed and agree with the history as documented  Social History     Tobacco Use    Smoking status: Never Smoker    Smokeless tobacco: Never Used   Substance Use Topics    Alcohol use: No    Drug use: No        Review of Systems   Constitutional: Negative  Negative for chills and fever  HENT: Positive for congestion, ear pain, postnasal drip, rhinorrhea, sinus pressure and sinus pain  Negative for dental problem, drooling, ear discharge, facial swelling, hearing loss, mouth sores, nosebleeds, sneezing, sore throat, tinnitus, trouble swallowing and voice change  Eyes: Negative  Respiratory: Positive for cough  Negative for apnea, choking, chest tightness, shortness of breath, wheezing and stridor  Cardiovascular: Negative  Negative for chest pain and leg swelling  Gastrointestinal: Negative  Negative for abdominal pain, blood in stool and vomiting  Endocrine: Negative  Genitourinary: Negative  Musculoskeletal: Negative  Negative for neck stiffness  Skin: Negative  Allergic/Immunologic: Negative      Neurological: Negative  Negative for dizziness, seizures, speech difficulty, weakness, light-headedness, numbness and headaches  Hematological: Negative  Psychiatric/Behavioral: Negative  All other systems reviewed and are negative  Physical Exam  Physical Exam   Constitutional: She is oriented to person, place, and time  Vital signs are normal  She appears well-developed and well-nourished  She does not have a sickly appearance  She does not appear ill  No distress  HENT:   Right Ear: External ear normal  No swelling  Tympanic membrane is not bulging  Left Ear: External ear normal  No swelling  Tympanic membrane is not bulging  Nose: Nose normal    Mouth/Throat: Oropharynx is clear and moist  No oropharyngeal exudate  Small fluid noted in b/l Tms  Eyes: Pupils are equal, round, and reactive to light  Conjunctivae, EOM and lids are normal    Neck: Normal range of motion  Neck supple  No JVD present  No tracheal deviation, no edema and normal range of motion present  No thyromegaly present  Cardiovascular: Normal rate, regular rhythm, normal heart sounds, intact distal pulses and normal pulses  Exam reveals no gallop and no friction rub  No murmur heard  Pulmonary/Chest: Effort normal and breath sounds normal  No stridor  No respiratory distress  She has no wheezes  She has no rales  She exhibits no tenderness  Scattered rhonchi   Abdominal: Soft  Bowel sounds are normal  She exhibits no distension and no mass  There is no tenderness  There is no rebound, no guarding and negative Pino's sign  No hernia  Musculoskeletal: Normal range of motion  She exhibits no edema or tenderness  Lymphadenopathy:     She has no cervical adenopathy  Neurological: She is alert and oriented to person, place, and time  She has normal strength and normal reflexes  No cranial nerve deficit or sensory deficit  GCS eye subscore is 4  GCS verbal subscore is 5  GCS motor subscore is 6  Skin: Skin is warm and dry  Capillary refill takes less than 2 seconds  No rash noted  She is not diaphoretic  No erythema  No pallor  Psychiatric: She has a normal mood and affect  Her speech is normal and behavior is normal    Vitals reviewed  Vital Signs  ED Triage Vitals [09/16/19 1832]   Temperature Pulse Respirations Blood Pressure SpO2   97 9 °F (36 6 °C) 97 16 164/100 100 %      Temp Source Heart Rate Source Patient Position - Orthostatic VS BP Location FiO2 (%)   Temporal Monitor Sitting Right arm --      Pain Score       No Pain           Vitals:    09/16/19 1832   BP: 164/100   Pulse: 97   Patient Position - Orthostatic VS: Sitting         Visual Acuity      ED Medications  Medications - No data to display    Diagnostic Studies  Results Reviewed     None                 No orders to display              Procedures  Procedures       ED Course  ED Course as of Sep 16 2013   Mon Sep 16, 2019   1940 Blood Pressure: 164/100   1940 Temperature: 97 9 °F (36 6 °C)   1940 Pulse: 97   1940 Respirations: 16   1940 SpO2: 100 %                               MDM    Disposition  Final diagnoses:   Bronchitis     Time reflects when diagnosis was documented in both MDM as applicable and the Disposition within this note     Time User Action Codes Description Comment    9/16/2019  8:05 PM Fredrick Gr Add [J40] Bronchitis       ED Disposition     ED Disposition Condition Date/Time Comment    Discharge Stable Mon Sep 16, 2019  8:04 PM Ileana Geller discharge to home/self care              Follow-up Information     Follow up With Specialties Details Why Contact Info    Sabino Blanco MD Family Medicine Schedule an appointment as soon as possible for a visit   [address] [facility]  [phone]            Patient's Medications   Discharge Prescriptions    AZITHROMYCIN (ZITHROMAX Z-HILL) 250 MG TABLET    Take 2 tablets today then 1 tablet daily x 4 days       Start Date: 9/16/2019 End Date: 9/20/2019       Order Dose: -- Quantity: 6 tablet    Refills: 0     No discharge procedures on file      ED Provider  Electronically Signed by           Zhang Cruz PA-C  09/16/19 2013

## 2019-10-04 ENCOUNTER — OFFICE VISIT (OUTPATIENT)
Dept: FAMILY MEDICINE CLINIC | Facility: CLINIC | Age: 34
End: 2019-10-04
Payer: COMMERCIAL

## 2019-10-04 VITALS
OXYGEN SATURATION: 95 % | SYSTOLIC BLOOD PRESSURE: 130 MMHG | BODY MASS INDEX: 45.99 KG/M2 | WEIGHT: 293 LBS | HEIGHT: 67 IN | HEART RATE: 83 BPM | DIASTOLIC BLOOD PRESSURE: 70 MMHG

## 2019-10-04 DIAGNOSIS — E66.01 MORBID OBESITY WITH BMI OF 60.0-69.9, ADULT (HCC): ICD-10-CM

## 2019-10-04 DIAGNOSIS — R63.5 WEIGHT GAIN: ICD-10-CM

## 2019-10-04 DIAGNOSIS — J30.1 SEASONAL ALLERGIC RHINITIS DUE TO POLLEN: ICD-10-CM

## 2019-10-04 DIAGNOSIS — R42 DIZZINESS: ICD-10-CM

## 2019-10-04 DIAGNOSIS — R51.9 CHRONIC INTRACTABLE HEADACHE, UNSPECIFIED HEADACHE TYPE: ICD-10-CM

## 2019-10-04 DIAGNOSIS — Z13.1 SCREENING FOR DIABETES MELLITUS: ICD-10-CM

## 2019-10-04 DIAGNOSIS — G89.29 CHRONIC INTRACTABLE HEADACHE, UNSPECIFIED HEADACHE TYPE: ICD-10-CM

## 2019-10-04 DIAGNOSIS — Z13.220 SCREENING FOR HYPERLIPIDEMIA: ICD-10-CM

## 2019-10-04 DIAGNOSIS — E03.9 ACQUIRED HYPOTHYROIDISM: Primary | ICD-10-CM

## 2019-10-04 PROCEDURE — 99203 OFFICE O/P NEW LOW 30 MIN: CPT | Performed by: PHYSICIAN ASSISTANT

## 2019-10-04 RX ORDER — CETIRIZINE HYDROCHLORIDE 10 MG/1
10 TABLET ORAL DAILY
COMMUNITY
End: 2019-10-04 | Stop reason: ALTCHOICE

## 2019-10-04 RX ORDER — LORATADINE 10 MG/1
10 TABLET ORAL DAILY
Qty: 90 TABLET | Refills: 3 | Status: SHIPPED | OUTPATIENT
Start: 2019-10-04 | End: 2019-10-04 | Stop reason: SDUPTHER

## 2019-10-04 RX ORDER — LORATADINE 10 MG/1
10 TABLET ORAL DAILY
Qty: 90 TABLET | Refills: 3 | Status: SHIPPED | OUTPATIENT
Start: 2019-10-04 | End: 2021-01-06 | Stop reason: ALTCHOICE

## 2019-10-04 NOTE — PROGRESS NOTES
Assessment/Plan:    Problem List Items Addressed This Visit        Endocrine    Hypothyroidism - Primary    Relevant Orders    TSH, 3rd generation with Free T4 reflex       Respiratory    Allergic rhinitis    Relevant Medications    loratadine (CLARITIN) 10 mg tablet      Other Visit Diagnoses     Dizziness        Relevant Orders    CBC and differential    Comprehensive metabolic panel    TSH, 3rd generation with Free T4 reflex    Screening for hyperlipidemia        Relevant Orders    Comprehensive metabolic panel    Lipid panel    Chronic intractable headache, unspecified headache type        Screening for diabetes mellitus        Relevant Orders    HEMOGLOBIN A1C W/ EAG ESTIMATION    Weight gain               Diagnoses and all orders for this visit:    Acquired hypothyroidism  -     TSH, 3rd generation with Free T4 reflex; Future    Seasonal allergic rhinitis due to pollen  -     Discontinue: loratadine (CLARITIN) 10 mg tablet; Take 1 tablet (10 mg total) by mouth daily  -     loratadine (CLARITIN) 10 mg tablet; Take 1 tablet (10 mg total) by mouth daily    Dizziness  -     CBC and differential; Future  -     Comprehensive metabolic panel; Future  -     TSH, 3rd generation with Free T4 reflex; Future    Screening for hyperlipidemia  -     Comprehensive metabolic panel; Future  -     Lipid panel; Future    Chronic intractable headache, unspecified headache type    Screening for diabetes mellitus  -     HEMOGLOBIN A1C W/ EAG ESTIMATION; Future    Weight gain    Other orders  -     Discontinue: cetirizine (ZyrTEC) 10 mg tablet; Take 10 mg by mouth daily  -     cariprazine (VRAYLAR) 3 MG capsule; Take 3 mg by mouth daily        Will get routine labs  Will get an updated TSH  Recommended that patient try to watch diet and exercise  This is a complicated subject because of patient's history of anorexia and obsessive behaviors   Encouraged her to continue to follow with her counselor and psychiatrist    Weight gain is likely due to unhealthy lifestyle  Explained that lower extremity swelling is also due to her weight  She may try restricting sodium and elevating feet  Encouraged her to schedule a follow-up with her neurologist to discuss headaches  Will try switching her allergy medicine to Claritin along with flonase to see if that helps with her allergies and headache relief  Follow-up in 2 months  Subjective:      Patient ID: Luma Stallworth is a 27 y o  female  Jose L Ford is a 27year old female here to establish care with many complaints  She just moved back home with her mother  She was living with a friend, however, she realized it was not a good environment for her  She gained about 30 pounds since April because she was not eating right  She has noticed more swelling in her lower extremities and over the past few weeks some numbness in her right great toe  She suffers from chronic low back pain  She does not have pain with walking, redness, rash  She lives a sedentary lifestyle  She has a history of body image issues and history of anorexia  She is following with a psychiatrist for her obsessive behaviors and is on multiple medications for her anxiety and depression  She does see a therapist regularly as well  She is concerned because she has been having episodes of feeling weak and dizzy  She does feel better after eating  When this happened the other day her blood sugar was 85  Diabetes does run in her family  She is also complaining of chronic headaches  She was seeing both a neurologist and an ENT for her headaches but never followed up        The following portions of the patient's history were reviewed and updated as appropriate:   She has a past medical history of Anorexia nervosa in remission, Anxiety, Back pain, Bipolar disorder ([facility] [address] ), Depression, Esophageal reflux (8/15/2013), Hypertension, Hypothyroid, Idiopathic intracranial hypertension, Lumbar degenerative disc disease (5/8/2014), Obesity, Obsessive-compulsive disorder, Overactive bladder, Psychiatric disorder, Restless leg syndrome, controlled, Seasonal allergies, and Suicide and self-inflicted injury ([facility] [address] )  ,  does not have any pertinent problems on file  ,   has a past surgical history that includes Tonsillectomy; Sinus endoscopy; Dental surgery; and IR lumbar puncture (2/26/2019)  ,  family history includes Arthritis in her family; Breast cancer in her family and maternal grandmother; Cancer in her maternal grandfather, maternal grandmother, and paternal grandmother; Depression in her mother; Diabetes in her mother; Heart disease in her maternal grandmother; Hypertension in her brother, family, father, and mother; Hypothyroidism in her father; Mental illness in her father; Osteopenia in her family; Osteoporosis in her family; Other in her mother; Pneumonia in her maternal grandfather and paternal grandfather; Skin cancer in her maternal grandmother; Stroke in her maternal grandmother; Suicide Attempts in her mother; Thyroid disease in her father; Transient ischemic attack in her family  ,   reports that she has never smoked  She has never used smokeless tobacco  She reports that she does not drink alcohol or use drugs  ,  is allergic to doxycycline and other     Current Outpatient Medications   Medication Sig Dispense Refill    buPROPion (WELLBUTRIN XL) 300 mg 24 hr tablet       cariprazine (VRAYLAR) 3 MG capsule Take 3 mg by mouth daily      Cholecalciferol (VITAMIN D3) 5000 units CAPS Take 5,000 Units by mouth daily      clomiPRAMINE (ANAFRANIL) 50 mg capsule Take 100 mg by mouth 2 (two) times a day      gabapentin (NEURONTIN) 600 MG tablet Take 0 5 tablets (300 mg total) by mouth daily in the early morning AND 1 5 tablets (900 mg total) daily at bedtime  Do all this for 90 days   180 tablet 3    HYDROcodone-acetaminophen (NORCO) 5-325 mg per tablet Take 1 tablet by mouth 2 (two) times a day as needed for pain (severe pain)      levothyroxine 100 mcg tablet Take 1 tablet (100 mcg total) by mouth daily in the early morning for 30 days 30 tablet 3    LORazepam (ATIVAN) 1 mg tablet Take 1 mg by mouth 3 (three) times a day as needed for anxiety      meloxicam (MOBIC) 7 5 mg tablet TAKE 1 AFTER BREAKFAST AND DINNER DAILY AS NEEDED FOR PAIN AND SCIATICA  60 tablet 0    Multiple Vitamin (MULTIVITAMIN) capsule Take 1 capsule by mouth daily      omeprazole (PriLOSEC) 20 mg delayed release capsule Take 1 capsule (20 mg total) by mouth daily in the early morning for 90 days 90 capsule 2    prochlorperazine (COMPAZINE) 10 mg tablet 1 tab at onset of migraine, can repeat in 8 hours, can take with triptan/NSAID 20 tablet 3    RESTASIS 0 05 % ophthalmic emulsion PLACE 1 DROP INTO BOTH EYES TWICE A DAY  3    rOPINIRole (REQUIP) 4 mg tablet TAKE 1 TABLET BY MOUTH TWICE A DAY 60 tablet 5    sertraline (ZOLOFT) 100 mg tablet Take 200 mg by mouth daily  0    topiramate (TOPAMAX) 100 mg tablet TAKE 1 TABLET BY MOUTH EVERYDAY AT BEDTIME 30 tablet 0    fluticasone (FLONASE) 50 mcg/act nasal spray 1 spray daily  1    loratadine (CLARITIN) 10 mg tablet Take 1 tablet (10 mg total) by mouth daily 90 tablet 3    lurasidone (LATUDA) 40 mg tablet Take 20 mg by mouth daily with breakfast       magnesium oxide (MAG-OX) 400 mg One in am daily (Patient not taking: Reported on 10/4/2019) 30 tablet 6    montelukast (SINGULAIR) 10 mg tablet TAKE 1 TABLET (10 MG TOTAL) BY MOUTH DAILY AT BEDTIME (Patient not taking: Reported on 10/4/2019) 30 tablet 1    Riboflavin (B-2) 100 MG TABS Two in am and two at bedtime (Patient not taking: Reported on 10/4/2019) 120 tablet 6     No current facility-administered medications for this visit  Review of Systems   Constitutional: Negative for chills, diaphoresis, fatigue and fever  HENT: Positive for postnasal drip, rhinorrhea and sinus pressure  Negative for congestion, ear pain, sneezing, sore throat and trouble swallowing  Eyes: Negative for pain and visual disturbance  Respiratory: Negative for apnea, cough, shortness of breath and wheezing  Cardiovascular: Negative for chest pain and palpitations  Gastrointestinal: Negative for abdominal pain, constipation, diarrhea, nausea and vomiting  Genitourinary: Negative for dysuria and hematuria  Musculoskeletal: Positive for arthralgias and back pain (chronic)  Negative for gait problem and myalgias  Neurological: Positive for dizziness (intermittent), weakness (intermittent), light-headedness (intermittent) and headaches  Negative for syncope and numbness  Psychiatric/Behavioral: Negative for suicidal ideas  The patient is not nervous/anxious  Objective:  Vitals:    10/04/19 0948   BP: 130/70   Pulse: 83   SpO2: 95%   Weight: (!) 189 kg (417 lb 9 6 oz)   Height: 5' 7" (1 702 m)     Body mass index is 65 41 kg/m²  Physical Exam   Constitutional: She is oriented to person, place, and time  She appears well-developed and well-nourished  Morbidly obese   HENT:   Head: Normocephalic and atraumatic  Right Ear: Tympanic membrane, external ear and ear canal normal    Left Ear: Tympanic membrane, external ear and ear canal normal    Nose: Mucosal edema and rhinorrhea present  Mouth/Throat: Oropharynx is clear and moist and mucous membranes are normal  No oropharyngeal exudate, posterior oropharyngeal edema or posterior oropharyngeal erythema  Eyes: Pupils are equal, round, and reactive to light  EOM are normal    Neck: Normal range of motion  Neck supple  Cardiovascular: Normal rate, regular rhythm and normal heart sounds  Exam reveals no gallop and no friction rub  No murmur heard  Pulmonary/Chest: Effort normal and breath sounds normal  No respiratory distress  She has no wheezes  She has no rales  Abdominal: Soft  Bowel sounds are normal  There is no tenderness  There is no rebound and no guarding  Musculoskeletal: Normal range of motion  Lymphadenopathy:     She has no cervical adenopathy  Neurological: She is alert and oriented to person, place, and time  Skin: Skin is warm and dry  Psychiatric: Her behavior is normal  Judgment and thought content normal  Her mood appears anxious  Her speech is tangential  She expresses no homicidal and no suicidal ideation  She expresses no suicidal plans and no homicidal plans  Vitals reviewed  BMI Counseling: Body mass index is 65 41 kg/m²  The BMI is above normal  Nutrition recommendations include reducing portion sizes, decreasing overall calorie intake and consuming healthier snacks  Exercise recommendations include exercising 3-5 times per week  Offered referral to weight management, but patient refused

## 2019-10-15 ENCOUNTER — OFFICE VISIT (OUTPATIENT)
Dept: FAMILY MEDICINE CLINIC | Facility: CLINIC | Age: 34
End: 2019-10-15
Payer: COMMERCIAL

## 2019-10-15 VITALS
HEIGHT: 67 IN | WEIGHT: 293 LBS | HEART RATE: 80 BPM | SYSTOLIC BLOOD PRESSURE: 122 MMHG | OXYGEN SATURATION: 97 % | DIASTOLIC BLOOD PRESSURE: 70 MMHG | BODY MASS INDEX: 45.99 KG/M2

## 2019-10-15 DIAGNOSIS — R53.83 FATIGUE, UNSPECIFIED TYPE: Primary | ICD-10-CM

## 2019-10-15 DIAGNOSIS — E66.01 MORBID OBESITY WITH BODY MASS INDEX (BMI) OF 60.0 TO 69.9 IN ADULT (HCC): ICD-10-CM

## 2019-10-15 PROCEDURE — 99213 OFFICE O/P EST LOW 20 MIN: CPT | Performed by: PHYSICIAN ASSISTANT

## 2019-10-15 NOTE — PROGRESS NOTES
Assessment/Plan:    Problem List Items Addressed This Visit     None      Visit Diagnoses     Fatigue, unspecified type    -  Primary    Morbid obesity with body mass index (BMI) of 60 0 to 69 9 in [facility])        Relevant Orders    Ambulatory referral to Weight Management           Diagnoses and all orders for this visit:    Fatigue, unspecified type    Morbid obesity with body mass index (BMI) of 60 0 to 69 9 in [facility])  -     Ambulatory referral to Weight Management; Future        Explained to her that her fatigue and feeling like she is in a fog is likely a side effect from all of her psychiatric medications  She needs to discuss this with her psychiatrist on Monday  Recommended that she continue to see her counselor  Referral placed to weight management  She will get labs completed that were ordered at her last visit  Subjective:      Patient ID: Luis Armando Macdonald is a 27 y o  female  Rocio Paredes is a 27year old female who is here complaining of feeling tired, and like she is in a daze  This has been worse over the past few weeks  She was recently started on Vraylar for her bipolar disorder, which she feels is not helping  She also feels like she is gaining weight and retaining fluid  She is not following a specific diet  She has a history of eating disorders and became obsessed with her weight to the point where she developed anorexia  She is still following with her psychiatrist and has a follow-up next week  She has a very complex psychiatric history  She is willing to have a consult with a weight management doctor  She did not get her labs completed from her last visit         The following portions of the patient's history were reviewed and updated as appropriate:   She has a past medical history of Anorexia nervosa in remission, Anxiety, Back pain, Bipolar disorder ([facility] [address] ), Depression, Esophageal reflux (8/15/2013), Hypertension, Hypothyroid, Idiopathic intracranial hypertension, Lumbar degenerative disc disease (5/8/2014), Obesity, Obsessive-compulsive disorder, Overactive bladder, Psychiatric disorder, Restless leg syndrome, controlled, Seasonal allergies, and Suicide and self-inflicted injury ([facility] [address] )  ,  does not have any pertinent problems on file  ,   has a past surgical history that includes Tonsillectomy; Sinus endoscopy; Dental surgery; and IR lumbar puncture (2/26/2019)  ,  family history includes Arthritis in her family; Breast cancer in her family and maternal grandmother; Cancer in her maternal grandfather, maternal grandmother, and paternal grandmother; Depression in her mother; Diabetes in her mother; Heart disease in her maternal grandmother; Hypertension in her brother, family, father, and mother; Hypothyroidism in her father; Mental illness in her father; Osteopenia in her family; Osteoporosis in her family; Other in her mother; Pneumonia in her maternal grandfather and paternal grandfather; Skin cancer in her maternal grandmother; Stroke in her maternal grandmother; Suicide Attempts in her mother; Thyroid disease in her father; Transient ischemic attack in her family  ,   reports that she has never smoked  She has never used smokeless tobacco  She reports that she drank alcohol  She reports that she does not use drugs  ,  is allergic to doxycycline and other     Current Outpatient Medications   Medication Sig Dispense Refill    buPROPion (WELLBUTRIN XL) 300 mg 24 hr tablet       cariprazine (VRAYLAR) 3 MG capsule Take 3 mg by mouth daily      Cholecalciferol (VITAMIN D3) 5000 units CAPS Take 5,000 Units by mouth daily      clomiPRAMINE (ANAFRANIL) 50 mg capsule Take 100 mg by mouth 2 (two) times a day      fluticasone (FLONASE) 50 mcg/act nasal spray 1 spray daily  1    HYDROcodone-acetaminophen (NORCO) 5-325 mg per tablet Take 1 tablet by mouth 2 (two) times a day as needed for pain (severe pain)      loratadine (CLARITIN) 10 mg tablet Take 1 tablet (10 mg total) by mouth daily 90 tablet 3    LORazepam (ATIVAN) 1 mg tablet Take 1 mg by mouth 3 (three) times a day as needed for anxiety      lurasidone (LATUDA) 40 mg tablet Take 20 mg by mouth daily with breakfast       meloxicam (MOBIC) 7 5 mg tablet TAKE 1 AFTER BREAKFAST AND DINNER DAILY AS NEEDED FOR PAIN AND SCIATICA  60 tablet 0    Multiple Vitamin (MULTIVITAMIN) capsule Take 1 capsule by mouth daily      prochlorperazine (COMPAZINE) 10 mg tablet 1 tab at onset of migraine, can repeat in 8 hours, can take with triptan/NSAID 20 tablet 3    RESTASIS 0 05 % ophthalmic emulsion PLACE 1 DROP INTO BOTH EYES TWICE A DAY  3    rOPINIRole (REQUIP) 4 mg tablet TAKE 1 TABLET BY MOUTH TWICE A DAY 60 tablet 5    sertraline (ZOLOFT) 100 mg tablet Take 200 mg by mouth daily  0    topiramate (TOPAMAX) 100 mg tablet TAKE 1 TABLET BY MOUTH EVERYDAY AT BEDTIME 30 tablet 0    gabapentin (NEURONTIN) 600 MG tablet Take 0 5 tablets (300 mg total) by mouth daily in the early morning AND 1 5 tablets (900 mg total) daily at bedtime  Do all this for 90 days  180 tablet 3    levothyroxine 100 mcg tablet Take 1 tablet (100 mcg total) by mouth daily in the early morning for 30 days 30 tablet 3    magnesium oxide (MAG-OX) 400 mg One in am daily (Patient not taking: Reported on 10/4/2019) 30 tablet 6    montelukast (SINGULAIR) 10 mg tablet TAKE 1 TABLET (10 MG TOTAL) BY MOUTH DAILY AT BEDTIME (Patient not taking: Reported on 10/4/2019) 30 tablet 1    omeprazole (PriLOSEC) 20 mg delayed release capsule Take 1 capsule (20 mg total) by mouth daily in the early morning for 90 days 90 capsule 2    Riboflavin (B-2) 100 MG TABS Two in am and two at bedtime (Patient not taking: Reported on 10/4/2019) 120 tablet 6     No current facility-administered medications for this visit  Review of Systems   Constitutional: Positive for fatigue and unexpected weight change  Negative for chills, diaphoresis and fever     HENT: Negative for congestion, ear pain, postnasal drip, rhinorrhea, sneezing, sore throat and trouble swallowing  Eyes: Negative for pain and visual disturbance  Respiratory: Negative for apnea, cough, shortness of breath and wheezing  Cardiovascular: Negative for chest pain and palpitations  Gastrointestinal: Negative for abdominal pain, constipation, diarrhea, nausea and vomiting  Genitourinary: Negative for dysuria and hematuria  Musculoskeletal: Negative for arthralgias, gait problem and myalgias  Neurological: Positive for headaches  Negative for dizziness, syncope, weakness, light-headedness and numbness  Psychiatric/Behavioral: Positive for decreased concentration, dysphoric mood and sleep disturbance  Negative for suicidal ideas  The patient is nervous/anxious  Objective:  Vitals:    10/15/19 0814   BP: 122/70   Pulse: 80   SpO2: 97%   Weight: (!) 193 kg (425 lb 6 4 oz)   Height: 5' 7" (1 702 m)     Body mass index is 66 63 kg/m²  Physical Exam   Constitutional: She is oriented to person, place, and time  She appears well-developed and well-nourished  No distress  Patient is morbidly obese   HENT:   Head: Normocephalic and atraumatic  Right Ear: External ear normal    Left Ear: External ear normal    Nose: Nose normal    Mouth/Throat: Oropharynx is clear and moist    Eyes: EOM are normal    Neck: Normal range of motion  Neck supple  Cardiovascular: Normal rate, regular rhythm and normal heart sounds  Exam reveals no gallop and no friction rub  No murmur heard  Pulmonary/Chest: Effort normal and breath sounds normal  No respiratory distress  She has no wheezes  She has no rales  Musculoskeletal: Normal range of motion  Neurological: She is alert and oriented to person, place, and time  Skin: Skin is warm and dry  She is not diaphoretic  Psychiatric: Her speech is tangential  She expresses no homicidal and no suicidal ideation  She expresses no suicidal plans and no homicidal plans     Vitals reviewed

## 2019-10-16 ENCOUNTER — APPOINTMENT (OUTPATIENT)
Dept: LAB | Facility: MEDICAL CENTER | Age: 34
End: 2019-10-16
Payer: COMMERCIAL

## 2019-10-16 DIAGNOSIS — E03.9 ACQUIRED HYPOTHYROIDISM: ICD-10-CM

## 2019-10-16 DIAGNOSIS — Z13.220 SCREENING FOR HYPERLIPIDEMIA: ICD-10-CM

## 2019-10-16 DIAGNOSIS — R42 DIZZINESS: ICD-10-CM

## 2019-10-16 DIAGNOSIS — Z13.1 SCREENING FOR DIABETES MELLITUS: ICD-10-CM

## 2019-10-16 LAB
ALBUMIN SERPL BCP-MCNC: 3.8 G/DL (ref 3.5–5)
ALP SERPL-CCNC: 121 U/L (ref 46–116)
ALT SERPL W P-5'-P-CCNC: 30 U/L (ref 12–78)
ANION GAP SERPL CALCULATED.3IONS-SCNC: 8 MMOL/L (ref 4–13)
AST SERPL W P-5'-P-CCNC: 16 U/L (ref 5–45)
BASOPHILS # BLD AUTO: 0.1 THOUSANDS/ΜL (ref 0–0.1)
BASOPHILS NFR BLD AUTO: 1 % (ref 0–1)
BILIRUB SERPL-MCNC: 0.27 MG/DL (ref 0.2–1)
BUN SERPL-MCNC: 17 MG/DL (ref 5–25)
CALCIUM SERPL-MCNC: 9.3 MG/DL (ref 8.3–10.1)
CHLORIDE SERPL-SCNC: 109 MMOL/L (ref 100–108)
CHOLEST SERPL-MCNC: 186 MG/DL (ref 50–200)
CO2 SERPL-SCNC: 24 MMOL/L (ref 21–32)
CREAT SERPL-MCNC: 0.74 MG/DL (ref 0.6–1.3)
EOSINOPHIL # BLD AUTO: 0.3 THOUSAND/ΜL (ref 0–0.61)
EOSINOPHIL NFR BLD AUTO: 3 % (ref 0–6)
ERYTHROCYTE [DISTWIDTH] IN BLOOD BY AUTOMATED COUNT: 15.4 % (ref 11.6–15.1)
EST. AVERAGE GLUCOSE BLD GHB EST-MCNC: 137 MG/DL
GFR SERPL CREATININE-BSD FRML MDRD: 105 ML/MIN/1.73SQ M
GLUCOSE P FAST SERPL-MCNC: 97 MG/DL (ref 65–99)
HBA1C MFR BLD: 6.4 % (ref 4.2–6.3)
HCT VFR BLD AUTO: 39.8 % (ref 34.8–46.1)
HDLC SERPL-MCNC: 65 MG/DL (ref 40–60)
HGB BLD-MCNC: 11.9 G/DL (ref 11.5–15.4)
IMM GRANULOCYTES # BLD AUTO: 0.08 THOUSAND/UL (ref 0–0.2)
IMM GRANULOCYTES NFR BLD AUTO: 1 % (ref 0–2)
LDLC SERPL CALC-MCNC: 107 MG/DL (ref 0–100)
LYMPHOCYTES # BLD AUTO: 2.82 THOUSANDS/ΜL (ref 0.6–4.47)
LYMPHOCYTES NFR BLD AUTO: 28 % (ref 14–44)
MCH RBC QN AUTO: 26.4 PG (ref 26.8–34.3)
MCHC RBC AUTO-ENTMCNC: 29.9 G/DL (ref 31.4–37.4)
MCV RBC AUTO: 88 FL (ref 82–98)
MONOCYTES # BLD AUTO: 0.73 THOUSAND/ΜL (ref 0.17–1.22)
MONOCYTES NFR BLD AUTO: 7 % (ref 4–12)
NEUTROPHILS # BLD AUTO: 6.06 THOUSANDS/ΜL (ref 1.85–7.62)
NEUTS SEG NFR BLD AUTO: 60 % (ref 43–75)
NONHDLC SERPL-MCNC: 121 MG/DL
NRBC BLD AUTO-RTO: 0 /100 WBCS
PLATELET # BLD AUTO: 312 THOUSANDS/UL (ref 149–390)
PMV BLD AUTO: 9.4 FL (ref 8.9–12.7)
POTASSIUM SERPL-SCNC: 3.9 MMOL/L (ref 3.5–5.3)
PROT SERPL-MCNC: 7.3 G/DL (ref 6.4–8.2)
RBC # BLD AUTO: 4.51 MILLION/UL (ref 3.81–5.12)
SODIUM SERPL-SCNC: 141 MMOL/L (ref 136–145)
T4 FREE SERPL-MCNC: 0.72 NG/DL (ref 0.76–1.46)
TRIGL SERPL-MCNC: 68 MG/DL
TSH SERPL DL<=0.05 MIU/L-ACNC: 4.4 UIU/ML (ref 0.36–3.74)
WBC # BLD AUTO: 10.09 THOUSAND/UL (ref 4.31–10.16)

## 2019-10-16 PROCEDURE — 80053 COMPREHEN METABOLIC PANEL: CPT

## 2019-10-16 PROCEDURE — 80061 LIPID PANEL: CPT

## 2019-10-16 PROCEDURE — 83036 HEMOGLOBIN GLYCOSYLATED A1C: CPT

## 2019-10-16 PROCEDURE — 84439 ASSAY OF FREE THYROXINE: CPT

## 2019-10-16 PROCEDURE — 36415 COLL VENOUS BLD VENIPUNCTURE: CPT

## 2019-10-16 PROCEDURE — 85025 COMPLETE CBC W/AUTO DIFF WBC: CPT

## 2019-10-16 PROCEDURE — 84443 ASSAY THYROID STIM HORMONE: CPT

## 2019-10-17 DIAGNOSIS — E03.9 HYPOTHYROIDISM, UNSPECIFIED TYPE: ICD-10-CM

## 2019-10-17 RX ORDER — LEVOTHYROXINE SODIUM 112 UG/1
112 TABLET ORAL
Qty: 30 TABLET | Refills: 2
Start: 2019-10-17 | End: 2019-10-18 | Stop reason: SDUPTHER

## 2019-10-18 DIAGNOSIS — E03.9 HYPOTHYROIDISM, UNSPECIFIED TYPE: ICD-10-CM

## 2019-10-18 RX ORDER — LEVOTHYROXINE SODIUM 112 UG/1
112 TABLET ORAL
Qty: 30 TABLET | Refills: 2 | Status: SHIPPED | OUTPATIENT
Start: 2019-10-18 | End: 2020-01-15 | Stop reason: SDUPTHER

## 2019-10-21 DIAGNOSIS — G43.719 INTRACTABLE CHRONIC MIGRAINE WITHOUT AURA AND WITHOUT STATUS MIGRAINOSUS: ICD-10-CM

## 2019-10-21 RX ORDER — TOPIRAMATE 100 MG/1
TABLET, FILM COATED ORAL
Qty: 30 TABLET | Refills: 4 | Status: SHIPPED | OUTPATIENT
Start: 2019-10-21 | End: 2020-01-21 | Stop reason: SDUPTHER

## 2019-11-01 ENCOUNTER — TELEPHONE (OUTPATIENT)
Dept: FAMILY MEDICINE CLINIC | Facility: CLINIC | Age: 34
End: 2019-11-01

## 2019-11-01 NOTE — TELEPHONE ENCOUNTER
Patient is getting intolerable migraines  She missed her appt with Neuro due to was out of the area at last minute now cant get in until March  She is wondering if you can increase her Topiramate to 200 mg  She said last neuro was going to do it but she ended up doing well on the 100 mg but it no longer works for her  Please advise    Also wanted to let you know psych decreased her Anafranil to 50mg at morning and night and that had a positive effect

## 2019-11-06 ENCOUNTER — TELEPHONE (OUTPATIENT)
Dept: NEUROLOGY | Facility: CLINIC | Age: 34
End: 2019-11-06

## 2019-11-06 DIAGNOSIS — G43.719 INTRACTABLE CHRONIC MIGRAINE WITHOUT AURA AND WITHOUT STATUS MIGRAINOSUS: Primary | ICD-10-CM

## 2019-11-06 RX ORDER — TOPIRAMATE 25 MG/1
TABLET ORAL
Qty: 120 TABLET | Refills: 1 | Status: SHIPPED | OUTPATIENT
Start: 2019-11-06 | End: 2019-11-26

## 2019-11-06 NOTE — TELEPHONE ENCOUNTER
Patient calling about her migraines  She's been having them almost weekly  Patient is currently on topiramate 100mg hs  She is questioning if she can increase this dosage, as she doesn't believe that it is helping anymore  Patient had to cancel last appointment, as she was out of town  I placed her in with you in [facility] in January, as things are getting increasingly worse per patient  Patient had previously seen Dr Blaire Valdez, and was originally scheduled to see her in March, but requested sooner appointment  Please advise if okay to increase topiramate at this time  [phone] callback number okay to leave detailed message

## 2019-11-06 NOTE — TELEPHONE ENCOUNTER
Can increase as follows:  125 mg at bedtime for 5 nights, 150 mg at bedtime for 5 nights, 175 mg at bedtime for 5 nights then 200 mg at bedtime  Will send in 25 mg tabs for her to use to get to appropriate dose

## 2019-11-07 ENCOUNTER — OFFICE VISIT (OUTPATIENT)
Dept: NEUROLOGY | Facility: CLINIC | Age: 34
End: 2019-11-07
Payer: COMMERCIAL

## 2019-11-07 ENCOUNTER — TELEPHONE (OUTPATIENT)
Dept: NEUROLOGY | Facility: CLINIC | Age: 34
End: 2019-11-07

## 2019-11-07 VITALS
DIASTOLIC BLOOD PRESSURE: 78 MMHG | BODY MASS INDEX: 45.99 KG/M2 | SYSTOLIC BLOOD PRESSURE: 129 MMHG | HEIGHT: 67 IN | HEART RATE: 89 BPM | WEIGHT: 293 LBS

## 2019-11-07 DIAGNOSIS — G43.709 CHRONIC MIGRAINE WITHOUT AURA WITHOUT STATUS MIGRAINOSUS, NOT INTRACTABLE: Primary | ICD-10-CM

## 2019-11-07 DIAGNOSIS — G93.2 IDIOPATHIC INTRACRANIAL HYPERTENSION: ICD-10-CM

## 2019-11-07 PROCEDURE — 99214 OFFICE O/P EST MOD 30 MIN: CPT | Performed by: PSYCHIATRY & NEUROLOGY

## 2019-11-07 PROCEDURE — 96372 THER/PROPH/DIAG INJ SC/IM: CPT | Performed by: PSYCHIATRY & NEUROLOGY

## 2019-11-07 RX ORDER — KETOROLAC TROMETHAMINE 30 MG/ML
60 INJECTION, SOLUTION INTRAMUSCULAR; INTRAVENOUS ONCE
Status: COMPLETED | OUTPATIENT
Start: 2019-11-07 | End: 2019-11-07

## 2019-11-07 RX ORDER — TIZANIDINE HYDROCHLORIDE 2 MG/1
2 CAPSULE, GELATIN COATED ORAL
Qty: 30 CAPSULE | Refills: 3 | Status: SHIPPED | OUTPATIENT
Start: 2019-11-07 | End: 2019-11-26

## 2019-11-07 RX ORDER — GABAPENTIN 400 MG/1
400 CAPSULE ORAL 3 TIMES DAILY
COMMUNITY
End: 2019-11-26

## 2019-11-07 RX ADMIN — KETOROLAC TROMETHAMINE 60 MG: 30 INJECTION, SOLUTION INTRAMUSCULAR; INTRAVENOUS at 15:34

## 2019-11-07 NOTE — PROGRESS NOTES
Patient ID: Marjan June is a 27 y o  female  Assessment/Plan:    Idiopathic intracranial hypertension  · No current symptoms suggestive of elevated ICP  · Pt will call if she has an visual changes  Chronic migraine without aura without status migrainosus, not intractable  · Pt will continue increasing the topiramate  · Tizanidine 2mg at bedtime will be added to help with both neck pain and the possibility that the neck pain is causing the headaches  · Side effects were reviewed  · Thirty minutes were spent with the patient gathering history, examining patient and formulating a treatment plan  Pt/family understood and were in agreement with the treatment plan  · She will follow-up in 3 months  Problem List Items Addressed This Visit        Cardiovascular and Mediastinum    Chronic migraine without aura without status migrainosus, not intractable - Primary     · Pt will continue increasing the topiramate  · Tizanidine 2mg at bedtime will be added to help with both neck pain and the possibility that the neck pain is causing the headaches  · Side effects were reviewed  · Thirty minutes were spent with the patient gathering history, examining patient and formulating a treatment plan  Pt/family understood and were in agreement with the treatment plan  · She will follow-up in 3 months  Relevant Medications    gabapentin (NEURONTIN) 400 mg capsule    TiZANidine (ZANAFLEX) 2 MG capsule       Nervous and Auditory    Idiopathic intracranial hypertension     · No current symptoms suggestive of elevated ICP  · Pt will call if she has an visual changes  Subjective:    HPI    We had the pleasure of evaluating Marjan June in neurological consultation today for headaches  As you know,  she is a 27 y o  right handed  female  Patient currently lives with her friend  She is here today for evaluation of her 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  Is on disability at this time for her underlying mood disorders  At her last appt she started topiramate and titrated up to 100mg at bedtime  She reports that since August she has been having an increase in migraines  She reports that when she has a headache she needs to sleep because she feels so fatigued  She did call the office and and is in the process of further increasing the topiramate  She denies any visual changes  She does admit to recent significant stress  Anxiety:  Patient has longstanding history of anxiety and was diagnosed with bipolar disorder in the past   Patient states that is a missed diagnosis as she was recently given the diagnosis of obsessive-compulsive disorder  She overall feels that her underlying psychiatric problems are well controlled  She is on the right medication at this time  Did complete Transcranial Magnetic Stimulation which significantly helped her underlying mood disorders  - regularly sees a therapist and a psychiatrist       Weight:   Patient states that she has always been overweight  In 2006 she lost about 135 lb as she was trying to lose weight but ended up becoming anorexic  Following that she was hospitalized for the eating disorder and later ended up gaining about 200 lb  Has had difficulty losing weight since  She has seen weight management therapist and sees one now at this time  She seems well educated with the diagnosis of idiopathic intracranial hypertension and knows that weight loss is one of the treatments  Any family history of migraines? No  Any family history of aneurysms? No     Idiopathic intracranial hypertension:  She is given the diagnosis back when she was 11years of age with opening pressure of 42 mm of water  Was on Diamox at that time  Her recent lumbar puncture showed an opening pressure of 17  Not documented in the note this number is per patient    Ophthalmology-  - sees an ophthalmologist is in her she who did inform the patient that she has left optic nerve swelling only  - given patient's insurance she was not able to see [facility]  Chronic daily headaches/chronic migraine headaches:  What is your current pain level - 3/10     Headaches started at what age? 8years old but when she was 14 they got much worse  When she was 15 she was given the diagnosis of IIH and LP done then and had OP of 42 mm of water  She states she was put on diamox and then symptoms resolved  She states back in fall of 2018 they started again and then started to have loss of vision on the left side when she moved quickly or when she bent over  She was seen by ophthalmology and told that she had optic nerve swelling on the left but not right  How often do the headaches occur? Has had headaches daily  What time of the day do the headaches start? Headache can be present upon awakening or comes on during the day  How long do the headaches last? Can last all day  Are you ever headache free? Very rarely  Aura and how long does it last - none     Describe your usual headache - Pressure, throbbing    Where is your headache located? Frontal and orbital region mostly but can also be in the neck and occipital area  What is the intensity of pain? 6-7/10 on average     Associated symptoms:   · Decrease of appetite  · Photophobia, phonophobia, sensitivity to smell   · Blind spot in her vision  · Tinnitus  · light-headed or dizzy, stiff or sore neck,   · prefer to be alone and in a dark room, unable to work     Number of days missed per month because of headaches:  Work (or school) days: Not presently working  Social or Family activities: often     Headache are worse if the patient: cough, sneeze, bending over, may make the vision worse as well  Headache triggers:  Cold weather, Lack of sleep, skipping meals  What time of the year do headaches occur more frequently?  None     Have you seen someone else for headaches or pain? Yes  Have you had trigger point injection performed and how often? No  Have you had Botox injection performed and how often? No   Have you had epidural injections or transforaminal injections performed? No     What medications do you take or have you taken for your headaches? PREVENTATIVE:  - magnesium, B2, vitamin-D  - Claritin  - lisinopril  - Wellbutrin, Zoloft, haloperidol, lithium, Latuda, Cogentin, Risperdal  - Topamax, gabapentin, Lamictal  ABORTIVE:  - Toradol, ibuprofen, diclofenac  - hydrocodone  - Ativan     Neck pain   When did the neck pain start? Has worsened over the past few months  Does your neck pain cause you to have headaches? Possibly  Sleep Habit  Is your sleep restful? no  What time do you go to bed at night? Has recently only slept a few hours at a time  What time do you wake up in am? 5 to 5:30  How often do you get up at night? 2-3 times  Do you wake up with headaches? Can   Do you snore while asleep? no  Have you been told that you stop breathing during sleeping?    no  Do you wake up tired in the morning? yes  Do you take frequent naps during the day? sometimes  Do you have jaw pain? no  Do you grind/clench your teeth at night? yes  Do you have restless leg syndrome? yes  Do you have nightmare or sleep walk? Not usually     Alternative therapies used in the past for headaches? no other headache interventions have been tried  Have you used CBD or THC for your headaches and how often? No  Are you current pregnant or planning on getting pregnant? No  Have you ever had any Brain imaging? yes I personally reviewed these images       The following portions of the patient's history were reviewed and updated as appropriate: allergies, current medications, past family history, past medical history, past social history, past surgical history and problem list          Objective:    Blood pressure 129/78, pulse 89, height 5' 7" (1 702 m), weight (!) 192 kg (423 lb)  Physical Exam   Eyes: Pupils are equal, round, and reactive to light  Lids are normal    Neurological: She has normal strength and normal reflexes  Coordination normal    Psychiatric: Her speech is normal    Vitals reviewed  Neurological Exam  Mental Status   Oriented to person, place, time and situation  Speech is normal  Language is fluent with no aphasia  Attention and concentration are normal  Fund of knowledge is appropriate for level of education  Apraxia absent  Cranial Nerves  CN II: Visual acuity is normal  Visual fields full to confrontation  CN III, IV, VI: Extraocular movements intact bilaterally  Normal lids and orbits bilaterally  Pupils equal round and reactive to light bilaterally  CN V: Facial sensation is normal   CN VII: Full and symmetric facial movement  CN VIII: Hearing is normal   CN IX, X: Palate elevates symmetrically  Normal gag reflex  CN XI: Shoulder shrug strength is normal   CN XII: Tongue midline without atrophy or fasciculations  Motor   Strength is 5/5 throughout all four extremities  Sensory  Sensation is intact to light touch, pinprick, vibration and proprioception in all four extremities  Reflexes  Deep tendon reflexes are 2+ and symmetric in all four extremities with downgoing toes bilaterally  Coordination  Finger-to-nose, rapid alternating movements and heel-to-shin normal bilaterally without dysmetria  Gait  Normal casual, toe, heel and tandem gait  ROS:    Review of Systems   Constitutional: Negative  Negative for appetite change and fever  HENT: Negative  Negative for hearing loss, tinnitus, trouble swallowing and voice change  Eyes: Positive for visual disturbance (light )  Negative for photophobia and pain  Respiratory: Negative  Negative for shortness of breath  Cardiovascular: Negative  Negative for palpitations  Gastrointestinal: Negative  Negative for nausea and vomiting  Endocrine: Negative  Negative for cold intolerance and heat intolerance  Genitourinary: Negative  Negative for dysuria, frequency and urgency  Musculoskeletal: Positive for neck stiffness  Negative for myalgias  Skin: Negative  Negative for rash  Neurological: Positive for dizziness  Negative for tremors, seizures, syncope, facial asymmetry, speech difficulty, weakness, light-headedness, numbness and headaches (every day ,or two days a week)  Hematological: Negative  Does not bruise/bleed easily  Psychiatric/Behavioral: Negative  Negative for confusion, hallucinations and sleep disturbance (not well  sleeping to much)  Review of systems personally reviewed

## 2019-11-07 NOTE — PATIENT INSTRUCTIONS
Idiopathic intracranial hypertension  · No current symptoms suggestive of elevated ICP  · Pt will call if she has an visual changes  Chronic migraine without aura without status migrainosus, not intractable  · Pt will continue increasing the topiramate  · Tizanidine 2mg at bedtime will be added to help with both neck pain and the possibility that the neck pain is causing the headaches  · Side effects were reviewed  · Thirty minutes were spent with the patient gathering history, examining patient and formulating a treatment plan  Pt/family understood and were in agreement with the treatment plan  · She will follow-up in 3 months

## 2019-11-07 NOTE — ASSESSMENT & PLAN NOTE
· Pt will continue increasing the topiramate  · Tizanidine 2mg at bedtime will be added to help with both neck pain and the possibility that the neck pain is causing the headaches  · Side effects were reviewed  · Thirty minutes were spent with the patient gathering history, examining patient and formulating a treatment plan  Pt/family understood and were in agreement with the treatment plan  · She will follow-up in 3 months

## 2019-11-21 DIAGNOSIS — K21.9 GASTROESOPHAGEAL REFLUX DISEASE WITHOUT ESOPHAGITIS: ICD-10-CM

## 2019-11-21 RX ORDER — OMEPRAZOLE 20 MG/1
20 CAPSULE, DELAYED RELEASE ORAL
Qty: 90 CAPSULE | Refills: 1 | OUTPATIENT
Start: 2019-11-21 | End: 2020-02-19

## 2019-11-26 ENCOUNTER — HOSPITAL ENCOUNTER (EMERGENCY)
Facility: HOSPITAL | Age: 34
Discharge: HOME | End: 2019-11-26
Attending: EMERGENCY MEDICINE | Admitting: EMERGENCY MEDICINE
Payer: COMMERCIAL

## 2019-11-26 VITALS
WEIGHT: 293 LBS | TEMPERATURE: 98.4 F | HEART RATE: 80 BPM | SYSTOLIC BLOOD PRESSURE: 134 MMHG | OXYGEN SATURATION: 100 % | BODY MASS INDEX: 45.99 KG/M2 | DIASTOLIC BLOOD PRESSURE: 60 MMHG | HEIGHT: 67 IN | RESPIRATION RATE: 17 BRPM

## 2019-11-26 DIAGNOSIS — R53.83 FATIGUE: Primary | ICD-10-CM

## 2019-11-26 LAB
ALBUMIN SERPL BCP-MCNC: 3.4 G/DL (ref 3.5–5)
ALP SERPL-CCNC: 124 U/L (ref 46–116)
ALT SERPL W P-5'-P-CCNC: 28 U/L (ref 12–78)
ANION GAP SERPL CALCULATED.3IONS-SCNC: 9 MMOL/L (ref 4–13)
APTT PPP: 31 SECONDS (ref 23–37)
AST SERPL W P-5'-P-CCNC: 13 U/L (ref 5–45)
BASOPHILS # BLD AUTO: 0.08 THOUSANDS/ΜL (ref 0–0.1)
BASOPHILS NFR BLD AUTO: 1 % (ref 0–1)
BILIRUB SERPL-MCNC: 0.1 MG/DL (ref 0.2–1)
BUN SERPL-MCNC: 16 MG/DL (ref 5–25)
CALCIUM SERPL-MCNC: 8.9 MG/DL (ref 8.3–10.1)
CHLORIDE SERPL-SCNC: 106 MMOL/L (ref 100–108)
CO2 SERPL-SCNC: 25 MMOL/L (ref 21–32)
CREAT SERPL-MCNC: 0.71 MG/DL (ref 0.6–1.3)
EOSINOPHIL # BLD AUTO: 0.26 THOUSAND/ΜL (ref 0–0.61)
EOSINOPHIL NFR BLD AUTO: 3 % (ref 0–6)
ERYTHROCYTE [DISTWIDTH] IN BLOOD BY AUTOMATED COUNT: 14.9 % (ref 11.6–15.1)
GFR SERPL CREATININE-BSD FRML MDRD: 111 ML/MIN/1.73SQ M
GLUCOSE SERPL-MCNC: 106 MG/DL (ref 65–140)
HCT VFR BLD AUTO: 42.6 % (ref 34.8–46.1)
HGB BLD-MCNC: 13.1 G/DL (ref 11.5–15.4)
IMM GRANULOCYTES # BLD AUTO: 0.09 THOUSAND/UL (ref 0–0.2)
IMM GRANULOCYTES NFR BLD AUTO: 1 % (ref 0–2)
INR PPP: 1.07 (ref 0.84–1.19)
LYMPHOCYTES # BLD AUTO: 3.11 THOUSANDS/ΜL (ref 0.6–4.47)
LYMPHOCYTES NFR BLD AUTO: 30 % (ref 14–44)
MCH RBC QN AUTO: 27.1 PG (ref 26.8–34.3)
MCHC RBC AUTO-ENTMCNC: 30.8 G/DL (ref 31.4–37.4)
MCV RBC AUTO: 88 FL (ref 82–98)
MONOCYTES # BLD AUTO: 0.55 THOUSAND/ΜL (ref 0.17–1.22)
MONOCYTES NFR BLD AUTO: 5 % (ref 4–12)
NEUTROPHILS # BLD AUTO: 6.3 THOUSANDS/ΜL (ref 1.85–7.62)
NEUTS SEG NFR BLD AUTO: 60 % (ref 43–75)
NRBC BLD AUTO-RTO: 0 /100 WBCS
PLATELET # BLD AUTO: 328 THOUSANDS/UL (ref 149–390)
PMV BLD AUTO: 9 FL (ref 8.9–12.7)
POTASSIUM SERPL-SCNC: 3.7 MMOL/L (ref 3.5–5.3)
PROT SERPL-MCNC: 7.3 G/DL (ref 6.4–8.2)
PROTHROMBIN TIME: 13.9 SECONDS (ref 11.6–14.5)
RBC # BLD AUTO: 4.83 MILLION/UL (ref 3.81–5.12)
SODIUM SERPL-SCNC: 140 MMOL/L (ref 136–145)
T4 FREE SERPL-MCNC: 0.74 NG/DL (ref 0.76–1.46)
TSH SERPL DL<=0.05 MIU/L-ACNC: 3.2 UIU/ML (ref 0.36–3.74)
WBC # BLD AUTO: 10.39 THOUSAND/UL (ref 4.31–10.16)

## 2019-11-26 PROCEDURE — 99284 EMERGENCY DEPT VISIT MOD MDM: CPT

## 2019-11-26 PROCEDURE — 85610 PROTHROMBIN TIME: CPT | Performed by: EMERGENCY MEDICINE

## 2019-11-26 PROCEDURE — 80053 COMPREHEN METABOLIC PANEL: CPT | Performed by: EMERGENCY MEDICINE

## 2019-11-26 PROCEDURE — 85730 THROMBOPLASTIN TIME PARTIAL: CPT | Performed by: EMERGENCY MEDICINE

## 2019-11-26 PROCEDURE — 84443 ASSAY THYROID STIM HORMONE: CPT | Performed by: EMERGENCY MEDICINE

## 2019-11-26 PROCEDURE — 84439 ASSAY OF FREE THYROXINE: CPT | Performed by: EMERGENCY MEDICINE

## 2019-11-26 PROCEDURE — 85025 COMPLETE CBC W/AUTO DIFF WBC: CPT | Performed by: EMERGENCY MEDICINE

## 2019-11-26 PROCEDURE — 99282 EMERGENCY DEPT VISIT SF MDM: CPT | Performed by: EMERGENCY MEDICINE

## 2019-11-26 PROCEDURE — 36415 COLL VENOUS BLD VENIPUNCTURE: CPT | Performed by: EMERGENCY MEDICINE

## 2019-11-26 NOTE — ED NOTES
Patient ambulated with the assist of an ED-Tech to and from the bathroom without difficulties     Gurpreet Alford RN  11/26/19 [id number]

## 2019-11-27 ENCOUNTER — DOCUMENTATION (OUTPATIENT)
Dept: CASE MANAGEMENT | Facility: OTHER | Age: 34
End: 2019-11-27

## 2019-11-27 NOTE — CASE MANAGEMENT
I was referred to the patient to help her get established by a local counselor  When on the phone with the patient the call kept dropping  She asked me to call her back on her home phone [phone] which I did but did not get an answer

## 2019-11-27 NOTE — CASE MANAGEMENT
I was able to get in contact with the patient she would like her therapy sessions in [place] instead of [place]  I reached out to [phone] office and spoke to the   He voiced concerns that she has cancelled and missed a lot of appointments  They stated they would see her in [place] but if she misses the appointment she is no longer allowed to go there for therapy  The appointment is 1/8/20 @ 1400  The patient was ok with her appointment
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
## 2019-12-04 ENCOUNTER — TELEPHONE (OUTPATIENT)
Dept: OTOLARYNGOLOGY | Facility: CLINIC | Age: 34
End: 2019-12-04

## 2019-12-04 ENCOUNTER — OFFICE VISIT (OUTPATIENT)
Dept: FAMILY MEDICINE CLINIC | Facility: CLINIC | Age: 34
End: 2019-12-04
Payer: COMMERCIAL

## 2019-12-04 VITALS
BODY MASS INDEX: 45.99 KG/M2 | SYSTOLIC BLOOD PRESSURE: 128 MMHG | TEMPERATURE: 98.6 F | OXYGEN SATURATION: 99 % | WEIGHT: 293 LBS | DIASTOLIC BLOOD PRESSURE: 76 MMHG | HEART RATE: 87 BPM | HEIGHT: 67 IN

## 2019-12-04 DIAGNOSIS — Z00.00 MEDICARE ANNUAL WELLNESS VISIT, SUBSEQUENT: Primary | ICD-10-CM

## 2019-12-04 DIAGNOSIS — F41.1 GAD (GENERALIZED ANXIETY DISORDER): ICD-10-CM

## 2019-12-04 DIAGNOSIS — N92.1 MENORRHAGIA WITH IRREGULAR CYCLE: ICD-10-CM

## 2019-12-04 DIAGNOSIS — G47.19 EXCESSIVE DAYTIME SLEEPINESS: ICD-10-CM

## 2019-12-04 DIAGNOSIS — K21.9 GASTROESOPHAGEAL REFLUX DISEASE WITHOUT ESOPHAGITIS: ICD-10-CM

## 2019-12-04 DIAGNOSIS — F31.4 SEVERE BIPOLAR I DISORDER, MOST RECENT EPISODE DEPRESSED (HCC): ICD-10-CM

## 2019-12-04 DIAGNOSIS — J32.9 CHRONIC SINUSITIS, UNSPECIFIED LOCATION: ICD-10-CM

## 2019-12-04 PROCEDURE — 99214 OFFICE O/P EST MOD 30 MIN: CPT | Performed by: PHYSICIAN ASSISTANT

## 2019-12-04 PROCEDURE — 3008F BODY MASS INDEX DOCD: CPT | Performed by: PHYSICIAN ASSISTANT

## 2019-12-04 PROCEDURE — G0439 PPPS, SUBSEQ VISIT: HCPCS | Performed by: PHYSICIAN ASSISTANT

## 2019-12-04 PROCEDURE — 1036F TOBACCO NON-USER: CPT | Performed by: PHYSICIAN ASSISTANT

## 2019-12-04 RX ORDER — OMEPRAZOLE 20 MG/1
20 CAPSULE, DELAYED RELEASE ORAL
Qty: 90 CAPSULE | Refills: 1 | Status: SHIPPED | OUTPATIENT
Start: 2019-12-04 | End: 2020-05-18 | Stop reason: SDUPTHER

## 2019-12-04 NOTE — PATIENT INSTRUCTIONS
Medicare Preventive Visit Patient Instructions  Thank you for completing your Welcome to Medicare Visit or Medicare Annual Wellness Visit today  Your next wellness visit will be due in one year (12/4/2020)  The screening/preventive services that you may require over the next 5-10 years are detailed below  Some tests may not apply to you based off risk factors and/or age  Screening tests ordered at today's visit but not completed yet may show as past due  Also, please note that scanned in results may not display below  Preventive Screenings:  Service Recommendations Previous Testing/Comments   Colorectal Cancer Screening  * Colonoscopy    * Fecal Occult Blood Test (FOBT)/Fecal Immunochemical Test (FIT)  * Fecal DNA/Cologuard Test  * Flexible Sigmoidoscopy Age: 54-65 years old   Colonoscopy: every 10 years (may be performed more frequently if at higher risk)  OR  FOBT/FIT: every 1 year  OR  Cologuard: every 3 years  OR  Sigmoidoscopy: every 5 years  Screening may be recommended earlier than age 48 if at higher risk for colorectal cancer  Also, an individualized decision between you and your healthcare provider will decide whether screening between the ages of 74-80 would be appropriate  Colonoscopy: Not on file  FOBT/FIT: Not on file  Cologuard: Not on file  Sigmoidoscopy: Not on file         Breast Cancer Screening Age: 36 years old  Frequency: every 1-2 years  Not required if history of left and right mastectomy Mammogram: 12/06/2018    Screening Not Indicated   Cervical Cancer Screening Between the ages of 21-29, pap smear recommended once every 3 years  Between the ages of 33-67, can perform pap smear with HPV co-testing every 5 years     Recommendations may differ for women with a history of total hysterectomy, cervical cancer, or abnormal pap smears in past  Pap Smear: 02/26/2018    Screening Current   Hepatitis C Screening Once for adults born between 1945 and 1965  More frequently in patients at high risk for Hepatitis C Hep C Antibody: Not on file       Diabetes Screening 1-2 times per year if you're at risk for diabetes or have pre-diabetes Fasting glucose: 97 mg/dL   A1C: 6 4 %    Screening Current   Cholesterol Screening Once every 5 years if you don't have a lipid disorder  May order more often based on risk factors  Lipid panel: 10/16/2019    Screening Current     Other Preventive Screenings Covered by Medicare:  1  Abdominal Aortic Aneurysm (AAA) Screening: covered once if your at risk  You're considered to be at risk if you have a family history of AAA  2  Lung Cancer Screening: covers low dose CT scan once per year if you meet all of the following conditions: (1) Age 50-69; (2) No signs or symptoms of lung cancer; (3) Current smoker or have quit smoking within the last 15 years; (4) You have a tobacco smoking history of at least 30 pack years (packs per day multiplied by number of years you smoked); (5) You get a written order from a healthcare provider  3  Glaucoma Screening: covered annually if you're considered high risk: (1) You have diabetes OR (2) Family history of glaucoma OR (3)  aged 48 and older OR (3)  American aged 72 and older  3  Osteoporosis Screening: covered every 2 years if you meet one of the following conditions: (1) You're estrogen deficient and at risk for osteoporosis based off medical history and other findings; (2) Have a vertebral abnormality; (3) On glucocorticoid therapy for more than 3 months; (4) Have primary hyperparathyroidism; (5) On osteoporosis medications and need to assess response to drug therapy  · Last bone density test (DXA Scan): Not on file  5  HIV Screening: covered annually if you're between the age of 12-76  Also covered annually if you are younger than 13 and older than 72 with risk factors for HIV infection  For pregnant patients, it is covered up to 3 times per pregnancy      Immunizations:  Immunization Recommendations Influenza Vaccine Annual influenza vaccination during flu season is recommended for all persons aged >= 6 months who do not have contraindications   Pneumococcal Vaccine (Prevnar and Pneumovax)  * Prevnar = PCV13  * Pneumovax = PPSV23   Adults 25-60 years old: 1-3 doses may be recommended based on certain risk factors  Adults 72 years old: Prevnar (PCV13) vaccine recommended followed by Pneumovax (PPSV23) vaccine  If already received PPSV23 since turning 65, then PCV13 recommended at least one year after PPSV23 dose  Hepatitis B Vaccine 3 dose series if at intermediate or high risk (ex: diabetes, end stage renal disease, liver disease)   Tetanus (Td) Vaccine - COST NOT COVERED BY MEDICARE PART B Following completion of primary series, a booster dose should be given every 10 years to maintain immunity against tetanus  Td may also be given as tetanus wound prophylaxis  Tdap Vaccine - COST NOT COVERED BY MEDICARE PART B Recommended at least once for all adults  For pregnant patients, recommended with each pregnancy  Shingles Vaccine (Shingrix) - COST NOT COVERED BY MEDICARE PART B  2 shot series recommended in those aged 48 and above     Health Maintenance Due:      Topic Date Due    Cervical Cancer Screening  02/26/2021     Immunizations Due:  There are no preventive care reminders to display for this patient  Advance Directives   What are advance directives? Advance directives are legal documents that state your wishes and plans for medical care  These plans are made ahead of time in case you lose your ability to make decisions for yourself  Advance directives can apply to any medical decision, such as the treatments you want, and if you want to donate organs  What are the types of advance directives? There are many types of advance directives, and each state has rules about how to use them  You may choose a combination of any of the following:  · Living will:   This is a written record of the treatment you want  You can also choose which treatments you do not want, which to limit, and which to stop at a certain time  This includes surgery, medicine, IV fluid, and tube feedings  · Durable power of  for healthcare [facility]): This is a written record that states who you want to make healthcare choices for you when you are unable to make them for yourself  This person, called a proxy, is usually a family member or a friend  You may choose more than 1 proxy  · Do not resuscitate (DNR) order:  A DNR order is used in case your heart stops beating or you stop breathing  It is a request not to have certain forms of treatment, such as CPR  A DNR order may be included in other types of advance directives  · Medical directive: This covers the care that you want if you are in a coma, near death, or unable to make decisions for yourself  You can list the treatments you want for each condition  Treatment may include pain medicine, surgery, blood transfusions, dialysis, IV or tube feedings, and a ventilator (breathing machine)  · Values history: This document has questions about your views, beliefs, and how you feel and think about life  This information can help others choose the care that you would choose  Why are advance directives important? An advance directive helps you control your care  Although spoken wishes may be used, it is better to have your wishes written down  Spoken wishes can be misunderstood, or not followed  Treatments may be given even if you do not want them  An advance directive may make it easier for your family to make difficult choices about your care  Urinary Incontinence   Urinary incontinence (UI)  is when you lose control of your bladder  UI develops because your bladder cannot store or empty urine properly  The 3 most common types of UI are stress incontinence, urge incontinence, or both  Medicines:   · May be given to help strengthen your bladder control   Report any side effects of medication to your healthcare provider  Do pelvic muscle exercises often:  Your pelvic muscles help you stop urinating  Squeeze these muscles tight for 5 seconds, then relax for 5 seconds  Gradually work up to squeezing for 10 seconds  Do 3 sets of 15 repetitions a day, or as directed  This will help strengthen your pelvic muscles and improve bladder control  Train your bladder:  Go to the bathroom at set times, such as every 2 hours, even if you do not feel the urge to go  You can also try to hold your urine when you feel the urge to go  For example, hold your urine for 5 minutes when you feel the urge to go  As that becomes easier, hold your urine for 10 minutes  Self-care:   · Keep a UI record  Write down how often you leak urine and how much you leak  Make a note of what you were doing when you leaked urine  · Drink liquids as directed  You may need to limit the amount of liquid you drink to help control your urine leakage  Do not drink any liquid right before you go to bed  Limit or do not have drinks that contain caffeine or alcohol  · Prevent constipation  Eat a variety of high-fiber foods  Good examples are high-fiber cereals, beans, vegetables, and whole-grain breads  Walking is the best way to trigger your intestines to have a bowel movement  · Exercise regularly and maintain a healthy weight  Weight loss and exercise will decrease pressure on your bladder and help you control your leakage  · Use a catheter as directed  to help empty your bladder  A catheter is a tiny, plastic tube that is put into your bladder to drain your urine  · Go to behavior therapy as directed  Behavior therapy may be used to help you learn to control your urge to urinate  Weight Management   Why it is important to manage your weight:  Being overweight increases your risk of health conditions such as heart disease, high blood pressure, type 2 diabetes, and certain types of cancer   It can also increase your risk for osteoarthritis, sleep apnea, and other respiratory problems  Aim for a slow, steady weight loss  Even a small amount of weight loss can lower your risk of health problems  How to lose weight safely:  A safe and healthy way to lose weight is to eat fewer calories and get regular exercise  You can lose up about 1 pound a week by decreasing the number of calories you eat by 500 calories each day  Healthy meal plan for weight management:  A healthy meal plan includes a variety of foods, contains fewer calories, and helps you stay healthy  A healthy meal plan includes the following:  · Eat whole-grain foods more often  A healthy meal plan should contain fiber  Fiber is the part of grains, fruits, and vegetables that is not broken down by your body  Whole-grain foods are healthy and provide extra fiber in your diet  Some examples of whole-grain foods are whole-wheat breads and pastas, oatmeal, brown rice, and bulgur  · Eat a variety of vegetables every day  Include dark, leafy greens such as spinach, kale, jaquan greens, and mustard greens  Eat yellow and orange vegetables such as carrots, sweet potatoes, and winter squash  · Eat a variety of fruits every day  Choose fresh or canned fruit (canned in its own juice or light syrup) instead of juice  Fruit juice has very little or no fiber  · Eat low-fat dairy foods  Drink fat-free (skim) milk or 1% milk  Eat fat-free yogurt and low-fat cottage cheese  Try low-fat cheeses such as mozzarella and other reduced-fat cheeses  · Choose meat and other protein foods that are low in fat  Choose beans or other legumes such as split peas or lentils  Choose fish, skinless poultry (chicken or turkey), or lean cuts of red meat (beef or pork)  Before you cook meat or poultry, cut off any visible fat  · Use less fat and oil  Try baking foods instead of frying them  Add less fat, such as margarine, sour cream, regular salad dressing and mayonnaise to foods   Eat fewer high-fat foods  Some examples of high-fat foods include french fries, doughnuts, ice cream, and cakes  · Eat fewer sweets  Limit foods and drinks that are high in sugar  This includes candy, cookies, regular soda, and sweetened drinks  Exercise:  Exercise at least 30 minutes per day on most days of the week  Some examples of exercise include walking, biking, dancing, and swimming  You can also fit in more physical activity by taking the stairs instead of the elevator or parking farther away from stores  Ask your healthcare provider about the best exercise plan for you  © Copyright [employer] 2018 Information is for End User's use only and may not be sold, redistributed or otherwise used for commercial purposes   All illustrations and images included in CareNotes® are the copyrighted property of A D A M , Inc  or [address] [place] [address]

## 2019-12-04 NOTE — PROGRESS NOTES
Assessment and Plan:     Problem List Items Addressed This Visit     None      Visit Diagnoses     Medicare annual wellness visit, subsequent    -  Primary    Menorrhagia with irregular cycle        Relevant Orders    Ambulatory referral to Obstetrics / Gynecology    Excessive daytime sleepiness        Relevant Orders    Diagnostic Sleep Study    Chronic sinusitis, unspecified location        Relevant Orders    Ambulatory Referral to Otolaryngology           Preventive health issues were discussed with patient, and age appropriate screening tests were ordered as noted in patient's After Visit Summary  Personalized health advice and appropriate referrals for health education or preventive services given if needed, as noted in patient's After Visit Summary       History of Present Illness:     Patient presents for Medicare Annual Wellness visit    Patient Care Team:  Lindsay Manzo PA-C as PCP - General (Family Medicine)  YANG Redmond PA-C Hiram Dyke, DO Clerance Bucks, MD Tabitha Bur, MD Marga Fenton, RN as Lead OP Care Mgr (Care Coordination)     Problem List:     Patient Active Problem List   Diagnosis    Obsessive-compulsive disorder    Hypertension    Hypothyroidism    Overactive bladder    Allergic rhinitis    Binge-eating disorder, severe    Gambling disorder, moderate    Severe bipolar I disorder, most recent episode depressed ([facility] [address] )    Encntr for gyn exam (general) (routine) w abnormal findings    Vaginal relaxation    Routine screening for STI (sexually transmitted infection)    BMI 50 0-59 9, adult ([address] )    Dysfunction of both eustachian tubes    Amenorrhea    Bulging lumbar disc    Esophageal reflux    EILEEN (generalized anxiety disorder)    Lumbar degenerative disc disease    Morbid obesity ([address] )    Nocturnal enuresis    PTSD (post-traumatic stress disorder)    RLS (restless legs syndrome)    Urgency incontinence    Vitamin D deficiency    Chronic midline low back pain without sciatica    Irregular bleeding    Iliotibial band syndrome of left side    Piriformis syndrome of left side    Skin lesion    Multiple nevi    [facility]    Recurrent sinusitis    Impaired fasting glucose    Breast nodule    Family history of cancer    Idiopathic intracranial hypertension    Visual field defect    Chronic migraine without aura without status migrainosus, not intractable    Chronic tension-type headache, not intractable      Past Medical and Surgical History:     Past Medical History:   Diagnosis Date    Anorexia nervosa in remission     Anxiety     Back pain     [facility] disorder [facility]     Depression     Esophageal reflux 8/15/2013    Hypertension     Hypothyroid     Idiopathic intracranial hypertension     Lumbar degenerative disc disease 5/8/2014    Obesity     Obsessive-compulsive disorder     Overactive bladder     Psychiatric disorder     Restless leg syndrome, controlled     Seasonal allergies     Suicide and self-inflicted injury [facility])      Past Surgical History:   Procedure Laterality Date    DENTAL SURGERY      IR LUMBAR PUNCTURE  2/26/2019    SINUS ENDOSCOPY      TONSILLECTOMY        Family History:     Family History   Problem Relation Age of Onset    Depression Mother     Suicide Attempts Mother     Hypertension Mother     Diabetes Mother     Other Mother         bicuspid aortic valve    Hypertension Father     Hypothyroidism Father     Mental illness Father     Thyroid disease Father     Hypertension Brother     Cancer Maternal Grandmother     Heart disease Maternal Grandmother     Stroke Maternal Grandmother     Breast cancer Maternal Grandmother     Skin cancer Maternal Grandmother     Pneumonia Maternal Grandfather     Cancer Maternal Grandfather     Cancer Paternal Grandmother     Pneumonia Paternal Grandfather     Arthritis Family     Breast cancer Family     Osteopenia Family     Osteoporosis Family     Hypertension Family     Transient ischemic attack Family       Social History:     Social History     Socioeconomic History    Marital status: Single     Spouse name: None    Number of children: None    Years of education: None    Highest education level: None   Occupational History    None   Social Needs    Financial resource strain: None    Food insecurity:     Worry: Never true     Inability: Never true    Transportation needs:     Medical: No     Non-medical: No   Tobacco Use    Smoking status: Never Smoker    Smokeless tobacco: Never Used   Substance and Sexual Activity    Alcohol use: Not Currently    Drug use: No    Sexual activity: Not Currently     Partners: Female   Lifestyle    Physical activity:     Days per week: None     Minutes per session: None    Stress: None   Relationships    Social connections:     Talks on phone: None     Gets together: None     Attends [employer] service: None     Active member of club or organization: None     Attends meetings of clubs or organizations: None     Relationship status: None    Intimate partner violence:     Fear of current or ex partner: None     Emotionally abused: None     Physically abused: None     Forced sexual activity: None   Other Topics Concern    None   Social History Narrative    Always uses seat belts    Caffeine use     Disabled - permament disability since 2008 due to psychiatric illness    Has no children    Single     Tea       Medications and Allergies:     Current Outpatient Medications   Medication Sig Dispense Refill    buPROPion (WELLBUTRIN XL) 300 mg 24 hr tablet       cariprazine (VRAYLAR) 3 MG capsule Take by mouth daily       Cholecalciferol (VITAMIN D3) 5000 units CAPS Take 5,000 Units by mouth daily      clomiPRAMINE (ANAFRANIL) 50 mg capsule Take 100 mg by mouth 2 (two) times a day      gabapentin (NEURONTIN) 600 MG tablet Take 0 5 tablets (300 mg total) by mouth daily in the early morning AND 1 5 tablets (900 mg total) daily at bedtime  Do all this for 90 days  180 tablet 3    HYDROcodone-acetaminophen (NORCO) 5-325 mg per tablet Take 1 tablet by mouth 2 (two) times a day as needed for pain (severe pain)      levothyroxine 112 mcg tablet Take 1 tablet (112 mcg total) by mouth daily in the early morning 30 tablet 2    loratadine (CLARITIN) 10 mg tablet Take 1 tablet (10 mg total) by mouth daily 90 tablet 3    LORazepam (ATIVAN) 1 mg tablet Take 1 mg by mouth 3 (three) times a day as needed for anxiety      meloxicam (MOBIC) 7 5 mg tablet TAKE 1 AFTER BREAKFAST AND DINNER DAILY AS NEEDED FOR PAIN AND SCIATICA  60 tablet 0    Multiple Vitamin (MULTIVITAMIN) capsule Take 1 capsule by mouth daily      omeprazole (PriLOSEC) 20 mg delayed release capsule Take 1 capsule (20 mg total) by mouth daily in the early morning for 90 days 90 capsule 2    prochlorperazine (COMPAZINE) 10 mg tablet 1 tab at onset of migraine, can repeat in 8 hours, can take with triptan/NSAID 20 tablet 3    RESTASIS 0 05 % ophthalmic emulsion PLACE 1 DROP INTO BOTH EYES TWICE A DAY  3    rOPINIRole (REQUIP) 4 mg tablet TAKE 1 TABLET BY MOUTH TWICE A DAY 60 tablet 5    sertraline (ZOLOFT) 100 mg tablet Take 200 mg by mouth daily  0    topiramate (TOPAMAX) 100 mg tablet TAKE 1 TABLET BY MOUTH EVERYDAY AT BEDTIME (Patient taking differently: Take 200 mg by mouth daily TAKE 1 TABLET BY MOUTH EVERYDAY AT BEDTIME) 30 tablet 4     No current facility-administered medications for this visit        Allergies   Allergen Reactions    Doxycycline      Pt refuses d/t remote H/O intracranial hypertension     Other      Seasonal allergies       Immunizations:     Immunization History   Administered Date(s) Administered    INFLUENZA 10/19/2015    Influenza Injectable, MDCK, Preservative Free, Quadrivalent 10/08/2019    Influenza Quadrivalent Preservative Free 3 years and older IM 10/12/2017    Influenza TIV (IM) 10/07/2014, 10/19/2015    Influenza, injectable, quadrivalent, preservative free 0 5 mL 08/29/2018    Tdap 02/13/2019    Tuberculin Skin Test-PPD Intradermal 08/20/2018      Health Maintenance:         Topic Date Due    Cervical Cancer Screening  02/26/2021     There are no preventive care reminders to display for this patient  Medicare Health Risk Assessment:     /76   Pulse 87   Temp 98 6 °F (37 °C)   Ht 5' 7" (1 702 m)   Wt (!) 194 kg (427 lb 9 6 oz)   SpO2 99%   BMI 66 97 kg/m²      Opal Berger is here for her Subsequent Wellness visit  Health Risk Assessment:   Patient rates overall health as fair  Patient feels that their physical health rating is same  Eyesight was rated as same  Hearing was rated as same  Patient feels that their emotional and mental health rating is slightly worse  Pain experienced in the last 7 days has been none  Patient states that she has experienced no weight loss or gain in last 6 months  Fall Risk Screening: In the past year, patient has experienced: no history of falling in past year      Urinary Incontinence Screening:   Patient has leaked urine accidently in the last six months  Home Safety:  Patient does not have trouble with stairs inside or outside of their home  Patient has working smoke alarms and has no working carbon monoxide detector  Home safety hazards include: none  Nutrition:   Current diet is Regular  Medications:   Patient is currently taking over-the-counter supplements  OTC medications include: see medication list  Patient is able to manage medications  Activities of Daily Living (ADLs)/Instrumental Activities of Daily Living (IADLs):   Walk and transfer into and out of bed and chair?: Yes  Dress and groom yourself?: Yes    Bathe or shower yourself?: Yes    Feed yourself?  Yes  Do your laundry/housekeeping?: Yes  Manage your money, pay your bills and track your expenses?: Yes  Make your own meals?: Yes    Do your own shopping?: Yes    Previous Hospitalizations:   Any hospitalizations or ED visits within the last 12 months?: No      Advance Care Planning:   Living will: No    Durable POA for healthcare: No    Advanced directive: No      PREVENTIVE SCREENINGS      Cardiovascular Screening:    General: Screening Current      Diabetes Screening:     General: Screening Current      Colorectal Cancer Screening:     General: Screening Not Indicated      Breast Cancer Screening:     General: Screening Not Indicated      Cervical Cancer Screening:    General: Screening Current      Osteoporosis Screening:    General: Screening Not Indicated      Abdominal Aortic Aneurysm (AAA) Screening:        General: Screening Not Indicated      Lung Cancer Screening:     General: Screening Not Indicated      Hepatitis C Screening:    General: Screening Not Indicated    Other Counseling Topics:   Alcohol use counseling, car/seat belt/driving safety, skin self-exam, sunscreen and calcium and vitamin D intake and regular weightbearing exercise         Edy Weinberg PA-C

## 2019-12-04 NOTE — PROGRESS NOTES
Assessment/Plan:    Problem List Items Addressed This Visit        Digestive    Esophageal reflux    Relevant Medications    omeprazole (PriLOSEC) 20 mg delayed release capsule       Other    Severe bipolar I disorder, most recent episode depressed (HCC)    EILEEN (generalized anxiety disorder)      Other Visit Diagnoses     Medicare annual wellness visit, subsequent    -  Primary    Menorrhagia with irregular cycle        Relevant Orders    Ambulatory referral to Obstetrics / Gynecology    Excessive daytime sleepiness        Relevant Orders    Diagnostic Sleep Study    Chronic sinusitis, unspecified location        Relevant Orders    Ambulatory Referral to Otolaryngology           Diagnoses and all orders for this visit:    Medicare annual wellness visit, subsequent    Menorrhagia with irregular cycle  -     Ambulatory referral to Obstetrics / Gynecology; Future    Excessive daytime sleepiness  -     Diagnostic Sleep Study; Future    Chronic sinusitis, unspecified location  -     Ambulatory Referral to Otolaryngology; Future    Severe bipolar I disorder, most recent episode depressed (HCC)    EILEEN (generalized anxiety disorder)    Gastroesophageal reflux disease without esophagitis  -     omeprazole (PriLOSEC) 20 mg delayed release capsule; Take 1 capsule (20 mg total) by mouth daily in the early morning        Referral placed to gynecology for menorrhagia  Referral placed to ENT for chronic sinus problems  Will obtain sleep study to assess chronic daytime sleepiness  She will continue to follow with her psychiatrist    She will continue to follow with neurology for chronic headaches  Subjective:      Patient ID: Bethel Sun is a 27 y o  female  Anthony Malhotra is here today for her medicare annual well visit, and has a few issues she would like to discuss  First, she has been having heavy menses  Her menses has been very irregular for the past few months  She denies any pain   She has not been to the gynecologist in over one year  She was on a medication for heavy menses in the past, but does not remember which one  She is also complaining of chronic sinus issues  She states that in the mornings she has a lot of drainage and pressure  She has tried nasal rinses and sprays in the past, but they sometimes trigger her migraines  She admits to having sinus surgery when she was in her teens  Lastly, she has been having a lot of daytime sleepiness  She did notice that her psych medications do cause fatigue, but it has been worse lately  She was never assessed for sleep apnea  She is still seeing her psychiatrist every two weeks  She was referred to weight management, but did not see them because her nutritionist and psychiatrist feel it may re-trigger her eating disorder  She is currently seeing physiatry, and is supposed to start PT for her low back pain and radiculopathy  The following portions of the patient's history were reviewed and updated as appropriate:   She has a past medical history of Anorexia nervosa in remission, Anxiety, Back pain, Bipolar disorder ([facility] [address] ), Depression, Esophageal reflux (8/15/2013), Hypertension, Hypothyroid, Idiopathic intracranial hypertension, Lumbar degenerative disc disease (5/8/2014), Obesity, Obsessive-compulsive disorder, Overactive bladder, Psychiatric disorder, Restless leg syndrome, controlled, Seasonal allergies, and Suicide and self-inflicted injury ([facility] [address] )  ,  does not have any pertinent problems on file  ,   has a past surgical history that includes Tonsillectomy; Sinus endoscopy; Dental surgery; and IR lumbar puncture (2/26/2019)  ,  family history includes Arthritis in her family; Breast cancer in her family and maternal grandmother; Cancer in her maternal grandfather, maternal grandmother, and paternal grandmother; Depression in her mother; Diabetes in her mother; Heart disease in her maternal grandmother; Hypertension in her brother, family, father, and mother; Hypothyroidism in her father; Mental illness in her father; Osteopenia in her family; Osteoporosis in her family; Other in her mother; Pneumonia in her maternal grandfather and paternal grandfather; Skin cancer in her maternal grandmother; Stroke in her maternal grandmother; Suicide Attempts in her mother; Thyroid disease in her father; Transient ischemic attack in her family  ,   reports that she has never smoked  She has never used smokeless tobacco  She reports that she drank alcohol  She reports that she does not use drugs  ,  is allergic to doxycycline and other     Current Outpatient Medications   Medication Sig Dispense Refill    buPROPion (WELLBUTRIN XL) 300 mg 24 hr tablet       cariprazine (VRAYLAR) 3 MG capsule Take by mouth daily       Cholecalciferol (VITAMIN D3) 5000 units CAPS Take 5,000 Units by mouth daily      clomiPRAMINE (ANAFRANIL) 50 mg capsule Take 100 mg by mouth 2 (two) times a day      gabapentin (NEURONTIN) 600 MG tablet Take 0 5 tablets (300 mg total) by mouth daily in the early morning AND 1 5 tablets (900 mg total) daily at bedtime  Do all this for 90 days  180 tablet 3    HYDROcodone-acetaminophen (NORCO) 5-325 mg per tablet Take 1 tablet by mouth 2 (two) times a day as needed for pain (severe pain)      levothyroxine 112 mcg tablet Take 1 tablet (112 mcg total) by mouth daily in the early morning 30 tablet 2    loratadine (CLARITIN) 10 mg tablet Take 1 tablet (10 mg total) by mouth daily 90 tablet 3    LORazepam (ATIVAN) 1 mg tablet Take 1 mg by mouth 3 (three) times a day as needed for anxiety      meloxicam (MOBIC) 7 5 mg tablet TAKE 1 AFTER BREAKFAST AND DINNER DAILY AS NEEDED FOR PAIN AND SCIATICA   60 tablet 0    Multiple Vitamin (MULTIVITAMIN) capsule Take 1 capsule by mouth daily      omeprazole (PriLOSEC) 20 mg delayed release capsule Take 1 capsule (20 mg total) by mouth daily in the early morning 90 capsule 1    prochlorperazine (COMPAZINE) 10 mg tablet 1 tab at onset of migraine, can repeat in 8 hours, can take with triptan/NSAID 20 tablet 3    RESTASIS 0 05 % ophthalmic emulsion PLACE 1 DROP INTO BOTH EYES TWICE A DAY  3    rOPINIRole (REQUIP) 4 mg tablet TAKE 1 TABLET BY MOUTH TWICE A DAY 60 tablet 5    sertraline (ZOLOFT) 100 mg tablet Take 200 mg by mouth daily  0    topiramate (TOPAMAX) 100 mg tablet TAKE 1 TABLET BY MOUTH EVERYDAY AT BEDTIME (Patient taking differently: Take 200 mg by mouth daily TAKE 1 TABLET BY MOUTH EVERYDAY AT BEDTIME) 30 tablet 4     No current facility-administered medications for this visit  Review of Systems   Constitutional: Negative for chills, diaphoresis, fatigue and fever  HENT: Positive for rhinorrhea and sinus pressure  Negative for congestion, ear pain, postnasal drip, sneezing, sore throat and trouble swallowing  Eyes: Negative for pain and visual disturbance  Respiratory: Negative for apnea, cough, shortness of breath and wheezing  Cardiovascular: Negative for chest pain and palpitations  Gastrointestinal: Negative for abdominal pain, constipation, diarrhea, nausea and vomiting  Genitourinary: Positive for menstrual problem  Negative for dysuria  Musculoskeletal: Positive for back pain  Negative for arthralgias, gait problem and myalgias  Neurological: Negative for dizziness, syncope, weakness, light-headedness, numbness and headaches  Psychiatric/Behavioral: Positive for sleep disturbance  Negative for suicidal ideas  The patient is nervous/anxious  Mood swings  Excessive daytime sleepiness         Objective:  Vitals:    12/04/19 1010   BP: 128/76   Pulse: 87   Temp: 98 6 °F (37 °C)   SpO2: 99%   Weight: (!) 194 kg (427 lb 9 6 oz)   Height: 5' 7" (1 702 m)     Body mass index is 66 97 kg/m²  Physical Exam   Constitutional: She is oriented to person, place, and time  She appears well-developed and well-nourished  HENT:   Head: Normocephalic and atraumatic     Right Ear: Tympanic membrane, external ear and ear canal normal    Left Ear: Tympanic membrane, external ear and ear canal normal    Nose: Nose normal    Mouth/Throat: Oropharynx is clear and moist and mucous membranes are normal  No oropharyngeal exudate, posterior oropharyngeal edema or posterior oropharyngeal erythema  Eyes: Pupils are equal, round, and reactive to light  EOM are normal    Neck: Normal range of motion  Neck supple  Cardiovascular: Normal rate, regular rhythm and normal heart sounds  Exam reveals no gallop and no friction rub  No murmur heard  Pulmonary/Chest: Effort normal and breath sounds normal  No respiratory distress  She has no wheezes  She has no rales  Abdominal: Soft  Bowel sounds are normal  There is no tenderness  There is no rebound and no guarding  Musculoskeletal: Normal range of motion  Lymphadenopathy:     She has no cervical adenopathy  Neurological: She is alert and oriented to person, place, and time  Skin: Skin is warm and dry  Psychiatric: She has a normal mood and affect  Her behavior is normal  Judgment and thought content normal  Her speech is tangential  She expresses no homicidal and no suicidal ideation  She expresses no suicidal plans and no homicidal plans  Vitals reviewed

## 2019-12-11 ENCOUNTER — EPISODE CHANGES (OUTPATIENT)
Dept: FAMILY MEDICINE CLINIC | Facility: CLINIC | Age: 34
End: 2019-12-11

## 2019-12-24 DIAGNOSIS — G25.81 RESTLESS LEG SYNDROME: ICD-10-CM

## 2019-12-24 RX ORDER — ROPINIROLE 4 MG/1
TABLET, FILM COATED ORAL
Qty: 60 TABLET | Refills: 2 | OUTPATIENT
Start: 2019-12-24

## 2019-12-27 DIAGNOSIS — G25.81 RESTLESS LEG SYNDROME: ICD-10-CM

## 2019-12-27 RX ORDER — ROPINIROLE 4 MG/1
TABLET, FILM COATED ORAL
Qty: 60 TABLET | Refills: 5 | Status: SHIPPED | OUTPATIENT
Start: 2019-12-27 | End: 2020-06-25

## 2019-12-30 ENCOUNTER — OFFICE VISIT (OUTPATIENT)
Dept: GYNECOLOGY | Facility: CLINIC | Age: 34
End: 2019-12-30
Payer: COMMERCIAL

## 2019-12-30 VITALS
BODY MASS INDEX: 67.75 KG/M2 | WEIGHT: 293 LBS | HEART RATE: 90 BPM | DIASTOLIC BLOOD PRESSURE: 88 MMHG | SYSTOLIC BLOOD PRESSURE: 132 MMHG

## 2019-12-30 DIAGNOSIS — N92.1 MENORRHAGIA WITH IRREGULAR CYCLE: ICD-10-CM

## 2019-12-30 PROCEDURE — 99214 OFFICE O/P EST MOD 30 MIN: CPT | Performed by: NURSE PRACTITIONER

## 2019-12-30 NOTE — PROGRESS NOTES
Assessment/Plan:     Complete pelvic ultrasound  Keep menstrual calendar  Return to office for annual exam      Diagnoses and all orders for this visit:    Menorrhagia with irregular cycle  -     Ambulatory referral to Obstetrics / Gynecology  -     US pelvis complete w transvaginal; Future              Subjective:      Patient ID: Luis Armando Macdonald is a 27 y o  female  Luis Armando Macdonald is a 27 y o  female who is here today for a problem visit  States that her mental health "crashed" starting May through September  She moved in with her mother mid September  She has been in a "bad relationship" during those months  No menses from June until September  Irregular menses since September  She bleeds most days with varied flow  Rare heavy days with clotting  Started new medication Vraylar at the same time  No abnormal vaginal discharge  Admits to "really bad pelvic pain last night for half hour " Described as sharp last night but now described as sore  Lessened today  Pain rating now 2/10  At worst rated 8//10  No meds taken  No alleviating or aggravating factors  Luis Armando Macdonald is not sexually active or dating  Feels safe currently  No condom used  Last parnter was a female  Normal thyroid study and CBC around 11/26/19  43 lb weight gain since 12/18  The following portions of the patient's history were reviewed and updated as appropriate: allergies, current medications, past family history, past medical history, past social history, past surgical history and problem list     Review of Systems   Constitutional: Negative  Eyes: Negative for visual disturbance  Respiratory: Negative for chest tightness and shortness of breath  Cardiovascular: Negative for chest pain, palpitations and leg swelling  Gastrointestinal: Negative for abdominal pain, constipation, diarrhea, nausea and vomiting  Genitourinary: Positive for menstrual problem, pelvic pain and vaginal bleeding   Negative for difficulty urinating, dysuria, flank pain, frequency, hematuria, urgency and vaginal discharge  Musculoskeletal: Negative  Skin: Negative  Neurological: Negative for weakness, light-headedness and headaches  Psychiatric/Behavioral: Negative  All other systems reviewed and are negative  Objective:      /88 (BP Location: Left arm, Patient Position: Sitting, Cuff Size: Standard)   Pulse 90   Wt (!) 196 kg (432 lb 9 6 oz)   LMP  (LMP Unknown) Comment: 9/2019   BMI 67 75 kg/m²          Physical Exam   Constitutional: She is oriented to person, place, and time  She appears well-developed and well-nourished  Physical exam limited by habitus   Genitourinary: Uterus normal  Rectal exam shows no external hemorrhoid  Pelvic exam was performed with patient supine  No labial fusion  There is no rash, tenderness, lesion or injury on the right labia  There is no rash, tenderness, lesion or injury on the left labia  Cervix exhibits no motion tenderness, no discharge and no friability  Right adnexum displays no mass, no tenderness and no fullness  Left adnexum displays no mass, no tenderness and no fullness  There is bleeding in the vagina  No erythema or tenderness in the vagina  No foreign body in the vagina  No signs of injury around the vagina  No vaginal discharge found  Genitourinary Comments: No obvious abnormalities but limited due to habitus  Musculoskeletal: Normal range of motion  Lymphadenopathy: No inguinal adenopathy noted on the right or left side  Right: No inguinal adenopathy present  Left: No inguinal adenopathy present  Neurological: She is alert and oriented to person, place, and time  Skin: Skin is warm and dry  Psychiatric: She has a normal mood and affect  Nursing note and vitals reviewed

## 2020-01-06 ENCOUNTER — TELEPHONE (OUTPATIENT)
Dept: FAMILY MEDICINE CLINIC | Facility: CLINIC | Age: 35
End: 2020-01-06

## 2020-01-06 ENCOUNTER — HOSPITAL ENCOUNTER (OUTPATIENT)
Dept: ULTRASOUND IMAGING | Facility: HOSPITAL | Age: 35
Discharge: HOME | End: 2020-01-06
Payer: COMMERCIAL

## 2020-01-06 ENCOUNTER — TELEPHONE (OUTPATIENT)
Dept: GYNECOLOGY | Facility: CLINIC | Age: 35
End: 2020-01-06

## 2020-01-06 DIAGNOSIS — N92.1 MENORRHAGIA WITH IRREGULAR CYCLE: ICD-10-CM

## 2020-01-06 PROCEDURE — 76856 US EXAM PELVIC COMPLETE: CPT

## 2020-01-06 PROCEDURE — 76830 TRANSVAGINAL US NON-OB: CPT

## 2020-01-06 NOTE — TELEPHONE ENCOUNTER
I will not order blood work without seeing her  She needs to either wait until tomorrow to see the GYN or go to the ER

## 2020-01-06 NOTE — TELEPHONE ENCOUNTER
Patient is still having heavy bleeding  States the last blood work she had was in November  Has an appt with the ENT tomorrow and his office is right by a lab  Would like to know if you can order blood work for her?

## 2020-01-06 NOTE — TELEPHONE ENCOUNTER
Patient was referred to GYN, has vaginal US today  Over the weekend started with heavier bleeding and just not feeling well  The Dr is out of the office until tomorrow so she is asking if you can order labs on her since she is going over to the hospital today  If not she has to wait to get an order from the GYN tomorrow  Please advise

## 2020-01-07 ENCOUNTER — OFFICE VISIT (OUTPATIENT)
Dept: OTOLARYNGOLOGY | Facility: CLINIC | Age: 35
End: 2020-01-07
Payer: COMMERCIAL

## 2020-01-07 VITALS
SYSTOLIC BLOOD PRESSURE: 124 MMHG | HEIGHT: 67 IN | BODY MASS INDEX: 45.99 KG/M2 | HEART RATE: 85 BPM | OXYGEN SATURATION: 97 % | DIASTOLIC BLOOD PRESSURE: 70 MMHG | WEIGHT: 293 LBS

## 2020-01-07 DIAGNOSIS — J32.9 CHRONIC SINUSITIS, UNSPECIFIED LOCATION: ICD-10-CM

## 2020-01-07 DIAGNOSIS — H91.90 HEARING LOSS, UNSPECIFIED HEARING LOSS TYPE, UNSPECIFIED LATERALITY: Primary | ICD-10-CM

## 2020-01-07 PROCEDURE — 99203 OFFICE O/P NEW LOW 30 MIN: CPT | Performed by: OTOLARYNGOLOGY

## 2020-01-07 NOTE — PROGRESS NOTES
Review of Systems   Constitutional: Negative  HENT: Positive for ear pain and sinus pain  Eyes: Negative  Respiratory: Negative  Cardiovascular: Negative  Gastrointestinal: Negative  Endocrine: Negative  Genitourinary: Negative  Musculoskeletal: Negative  Skin: Negative  Allergic/Immunologic: Negative  Neurological: Negative  Hematological: Negative  Psychiatric/Behavioral: Negative

## 2020-01-07 NOTE — PROGRESS NOTES
Consultation - Otolaryngology - Head and Neck Surgery  Facial Plastic and Reconstructive Surgery  Blaine Enriquez 27 y o  female MRN: [id number]  Encounter: [id number]        Assessment/Plan:  1  Hearing loss, unspecified hearing loss type, unspecified laterality  Ambulatory referral to Audiology   2  Chronic sinusitis, unspecified location  Ambulatory Referral to Otolaryngology       Symptoms of blocked right ear may be due to eustachian tube dysfunction however 512 hertz tuning fork lateralizes to the left and bilateral air conduction is greater than bone conduction  I recommend she have a hearing test done to evaluate  She was provided with referral and will schedule this at her earliest convenience  Regarding her sinuses, we discussed long-term management of chronic sinusitis with intranasal therapies  She was unable to tolerate nasal rinses well in the past though she admits she may have been using the rinse to aggressively  I recommend she restart this with mometasone  She agrees to start this for now and will follow up in about 2 months for re-evaluation  History of Present Illness   Physician Requesting Consult: Kwabena Araiza PA-C  Reason for Consult / Principal Problem:  Chronic sinusitis  HPI: Blaine Enriquez is a 27y o  year old female who presents for evaluation of chronic sinusitis  She was previously seen by Dr Deepthi Haider at the [facility] office in May 2019  However she was lost to follow-up after that  Plan for her chronic sinusitis was to consider left revision ethmoidectomy  Since she was last seen she has been noncompliant with nasal rinses and nasal sprays  She relates that using certain sprays will cause her migraines to person  She continues to follow up with Neurology for her migraines and was recently increased to 200 mg of Topamax which she feels has been adequately managing her migraines  She continues to have frequent nasal drainage    She had a sinus infection around September which required several courses of antibiotics though no steroids  She also relates 2 weeks of blocked right ear sensation  She admits to history of eustachian tube dysfunction  No associated ear pain however she did try using earwax  which she feels helped somewhat  Review of systems:  ROS was performed by the MA and documented in the attached note  This was reviewed personally      Historical Information   Past Medical History:   Diagnosis Date    Anorexia nervosa in remission     Anxiety     Back pain     Bipolar disorder (HCC)     Depression     Esophageal reflux 8/15/2013    Hypertension     Hypothyroid     Idiopathic intracranial hypertension     Lumbar degenerative disc disease 5/8/2014    Migraine     Obesity     Obsessive-compulsive disorder     Overactive bladder     Psychiatric disorder     Restless leg syndrome, controlled     Seasonal allergies     Suicide and self-inflicted injury ([facility] [address] )      Past Surgical History:   Procedure Laterality Date    DENTAL SURGERY      IR LUMBAR PUNCTURE  2/26/2019    SINUS ENDOSCOPY      TONSILLECTOMY       Social History   Social History     Substance and Sexual Activity   Alcohol Use No     Social History     Substance and Sexual Activity   Drug Use No     Social History     Tobacco Use   Smoking Status Never Smoker   Smokeless Tobacco Never Used     Family History:   Family History   Problem Relation Age of Onset    Depression Mother     Suicide Attempts Mother     Hypertension Mother     Diabetes Mother     Other Mother         bicuspid aortic valve    Hypertension Father     Hypothyroidism Father     Mental illness Father     Thyroid disease Father     Hypertension Brother     Cancer Maternal Grandmother     Heart disease Maternal Grandmother     Stroke Maternal Grandmother     Breast cancer Maternal Grandmother     Skin cancer Maternal Grandmother     Pneumonia Maternal Grandfather     Cancer Maternal Grandfather     Cancer Paternal Grandmother     Pneumonia Paternal Grandfather     Arthritis Family     Breast cancer Family     Osteopenia Family     Osteoporosis Family     Hypertension Family     Transient ischemic attack Family        Current Outpatient Medications on File Prior to Visit   Medication Sig    buPROPion (WELLBUTRIN XL) 300 mg 24 hr tablet     cariprazine (VRAYLAR) 3 MG capsule Take by mouth daily     Cholecalciferol (VITAMIN D3) 5000 units CAPS Take 5,000 Units by mouth daily    clomiPRAMINE (ANAFRANIL) 50 mg capsule Take 100 mg by mouth 2 (two) times a day    HYDROcodone-acetaminophen (NORCO) 5-325 mg per tablet Take 1 tablet by mouth 2 (two) times a day as needed for pain (severe pain)    levothyroxine 112 mcg tablet Take 1 tablet (112 mcg total) by mouth daily in the early morning    loratadine (CLARITIN) 10 mg tablet Take 1 tablet (10 mg total) by mouth daily    LORazepam (ATIVAN) 1 mg tablet Take 1 mg by mouth 3 (three) times a day as needed for anxiety    meloxicam (MOBIC) 7 5 mg tablet TAKE 1 AFTER BREAKFAST AND DINNER DAILY AS NEEDED FOR PAIN AND SCIATICA      Multiple Vitamin (MULTIVITAMIN) capsule Take 1 capsule by mouth daily    omeprazole (PriLOSEC) 20 mg delayed release capsule Take 1 capsule (20 mg total) by mouth daily in the early morning    prochlorperazine (COMPAZINE) 10 mg tablet 1 tab at onset of migraine, can repeat in 8 hours, can take with triptan/NSAID    RESTASIS 0 05 % ophthalmic emulsion PLACE 1 DROP INTO BOTH EYES TWICE A DAY    rOPINIRole (REQUIP) 4 mg tablet TAKE 1 TABLET BY MOUTH TWICE A DAY    sertraline (ZOLOFT) 100 mg tablet Take 200 mg by mouth daily    topiramate (TOPAMAX) 100 mg tablet TAKE 1 TABLET BY MOUTH EVERYDAY AT BEDTIME (Patient taking differently: Take 200 mg by mouth daily TAKE 1 TABLET BY MOUTH EVERYDAY AT BEDTIME)    gabapentin (NEURONTIN) 600 MG tablet Take 0 5 tablets (300 mg total) by mouth daily in the early morning AND 1 5 tablets (900 mg total) daily at bedtime  Do all this for 90 days  No current facility-administered medications on file prior to visit  Allergies   Allergen Reactions    Doxycycline      Pt refuses d/t remote H/O intracranial hypertension     Other      Seasonal allergies        Vitals:    01/07/20 0815   BP: 124/70   Pulse: 85   SpO2: 97%       Physical Exam   Constitutional: Oriented to person, place, and time  Well-developed and well-nourished, no apparent distress, non-toxic appearance  Cooperative, able to hear and answer questions without difficulty  Voice: Normal voice quality  Head: Normocephalic, atraumatic  No scars, masses or lesions  Face: Symmetric, no edema, no sinus tenderness  Eyes: Vision grossly intact, extra-ocular movement intact  Ears: External ears normal  Tympanic membranes intact with intact normal landmarks  No post-auricular erythema or tenderness  Nose: Septum intact, nares clear  Mucosa moist, turbinates well appearing  No crusting, polyps or discharge evident  Oral cavity: Dentition intact  Mucosa moist, lips without lesions or masses  Tongue mobile, floor of mouth soft and flat  Hard palate intact  No masses or lesions  Oropharynx: Uvula is midline, soft palate intact without lesion or mass  Oropharyngeal inlet without obstruction  Tonsils unremarkable  Posterior pharyngeal wall clear  No masses or lesions  Salivary glands:  Parotid glands and submandibular glands symmetric, no enlargement or tenderness  Neck: Normal laryngeal elevation with swallow  Trachea midline  No masses or lesions  No palpable adenopathy  Thyroid: Without tenderness or palpable nodules  Pulmonary/Chest: Normal effort and rate  No respiratory distress  No stertor or stridor  Musculoskeletal: Normal range of motion  Neurological: Cranial nerves 2-12 intact  Skin: Skin is warm and dry  Psychiatric: Normal mood and affect        Imaging Studies: I have personally reviewed pertinent reports  Lab Results: I have personally reviewed pertinent lab results  Greater than 40 minutes were spent in consultation  More than 1/2 of that time was spent in counselling and coordination of care

## 2020-01-08 DIAGNOSIS — N93.9 ABNORMAL VAGINAL BLEEDING: Primary | ICD-10-CM

## 2020-01-08 NOTE — TELEPHONE ENCOUNTER
Yes she does  At first she didn't want the message to go to you she was going to try her PCP  But now she wants to know if you would be able to order labs for her

## 2020-01-09 ENCOUNTER — APPOINTMENT (OUTPATIENT)
Dept: LAB | Facility: MEDICAL CENTER | Age: 35
End: 2020-01-09
Payer: COMMERCIAL

## 2020-01-09 DIAGNOSIS — N93.9 ABNORMAL VAGINAL BLEEDING: ICD-10-CM

## 2020-01-09 LAB
BASOPHILS # BLD AUTO: 0.09 THOUSANDS/ΜL (ref 0–0.1)
BASOPHILS NFR BLD AUTO: 1 % (ref 0–1)
EOSINOPHIL # BLD AUTO: 0.34 THOUSAND/ΜL (ref 0–0.61)
EOSINOPHIL NFR BLD AUTO: 3 % (ref 0–6)
ERYTHROCYTE [DISTWIDTH] IN BLOOD BY AUTOMATED COUNT: 14.7 % (ref 11.6–15.1)
HCT VFR BLD AUTO: 39 % (ref 34.8–46.1)
HGB BLD-MCNC: 12 G/DL (ref 11.5–15.4)
IMM GRANULOCYTES # BLD AUTO: 0.09 THOUSAND/UL (ref 0–0.2)
IMM GRANULOCYTES NFR BLD AUTO: 1 % (ref 0–2)
LYMPHOCYTES # BLD AUTO: 3.68 THOUSANDS/ΜL (ref 0.6–4.47)
LYMPHOCYTES NFR BLD AUTO: 28 % (ref 14–44)
MCH RBC QN AUTO: 27.1 PG (ref 26.8–34.3)
MCHC RBC AUTO-ENTMCNC: 30.8 G/DL (ref 31.4–37.4)
MCV RBC AUTO: 88 FL (ref 82–98)
MONOCYTES # BLD AUTO: 0.83 THOUSAND/ΜL (ref 0.17–1.22)
MONOCYTES NFR BLD AUTO: 6 % (ref 4–12)
NEUTROPHILS # BLD AUTO: 8.14 THOUSANDS/ΜL (ref 1.85–7.62)
NEUTS SEG NFR BLD AUTO: 61 % (ref 43–75)
NRBC BLD AUTO-RTO: 0 /100 WBCS
PLATELET # BLD AUTO: 347 THOUSANDS/UL (ref 149–390)
PMV BLD AUTO: 9.5 FL (ref 8.9–12.7)
RBC # BLD AUTO: 4.42 MILLION/UL (ref 3.81–5.12)
T4 FREE SERPL-MCNC: 0.72 NG/DL (ref 0.76–1.46)
TSH SERPL DL<=0.05 MIU/L-ACNC: 4.5 UIU/ML (ref 0.36–3.74)
WBC # BLD AUTO: 13.17 THOUSAND/UL (ref 4.31–10.16)

## 2020-01-09 PROCEDURE — 84439 ASSAY OF FREE THYROXINE: CPT

## 2020-01-09 PROCEDURE — 36415 COLL VENOUS BLD VENIPUNCTURE: CPT

## 2020-01-09 PROCEDURE — 85025 COMPLETE CBC W/AUTO DIFF WBC: CPT

## 2020-01-09 PROCEDURE — 84443 ASSAY THYROID STIM HORMONE: CPT

## 2020-01-10 ENCOUNTER — TELEPHONE (OUTPATIENT)
Dept: GYNECOLOGY | Facility: CLINIC | Age: 35
End: 2020-01-10

## 2020-01-10 NOTE — TELEPHONE ENCOUNTER
States she received a call regarding her bloodwork and her TSH being abnormal  She was more concerned with the CBC and being anemic from the bleeding  Explained the CBC did not show anemia, but she is still bleeding so she would like to know the next steps

## 2020-01-10 NOTE — TELEPHONE ENCOUNTER
Please schedule her annual appointment and let her know we can discuss the plan of care then  Her body is currently compensating with the amount she is bleeding but I do want to get it controlled  Keep bleeding calendar as discussed until we meet

## 2020-01-13 ENCOUNTER — OFFICE VISIT (OUTPATIENT)
Dept: GYNECOLOGY | Facility: CLINIC | Age: 35
End: 2020-01-13
Payer: COMMERCIAL

## 2020-01-13 VITALS
DIASTOLIC BLOOD PRESSURE: 74 MMHG | BODY MASS INDEX: 45.99 KG/M2 | HEIGHT: 67 IN | WEIGHT: 293 LBS | SYSTOLIC BLOOD PRESSURE: 128 MMHG

## 2020-01-13 DIAGNOSIS — E03.9 ACQUIRED HYPOTHYROIDISM: ICD-10-CM

## 2020-01-13 DIAGNOSIS — N92.1 MENORRHAGIA WITH IRREGULAR CYCLE: Primary | ICD-10-CM

## 2020-01-13 PROCEDURE — 99214 OFFICE O/P EST MOD 30 MIN: CPT | Performed by: NURSE PRACTITIONER

## 2020-01-13 RX ORDER — MEDROXYPROGESTERONE ACETATE 10 MG/1
10 TABLET ORAL DAILY
Qty: 10 TABLET | Refills: 0 | Status: SHIPPED | OUTPATIENT
Start: 2020-01-13 | End: 2020-01-31 | Stop reason: SDUPTHER

## 2020-01-13 NOTE — PROGRESS NOTES
Assessment/Plan:     Follow up with PCP for hypothyroidism  Start provera once daily x 10 days  Return to office for annual exam  Discussed weight loss and appropriate diet  Exercise 150 minutes per week  Diagnoses and all orders for this visit:    Menorrhagia with irregular cycle  -     medroxyPROGESTERone (PROVERA) 10 mg tablet; Take 1 tablet (10 mg total) by mouth daily    Acquired hypothyroidism              Subjective:      Patient ID: Scooter Eugene is a 27 y o  female  Scooter Eugene is a 27 y o  female who is here today for a problem visit  She has been bleeding most days since September with varied flow but mostly moderate  Prior to June menses was monthly x 5 days with mod flow  Once she entered in a stressful relationship in the summer, her menses became irregular  Normal pelvic US on 1/2020  Denies pelvic pain  1/9/2020 Normal H/H=12/39 , elevated TSH of 4 5 and low FT4 of 0 72  She is following with her PCP on Wednesday  Scooter Eugene is not sexually active  The following portions of the patient's history were reviewed and updated as appropriate: allergies, current medications, past family history, past medical history, past social history, past surgical history and problem list     Review of Systems   Constitutional: Negative  Eyes: Negative for visual disturbance  Respiratory: Negative for chest tightness and shortness of breath  Cardiovascular: Negative for chest pain, palpitations and leg swelling  Gastrointestinal: Negative for abdominal pain, constipation, diarrhea, nausea and vomiting  Genitourinary: Negative for dysuria, menstrual problem, vaginal bleeding and vaginal discharge  Skin: Negative  Neurological: Negative for weakness, light-headedness and headaches  Psychiatric/Behavioral: Negative  All other systems reviewed and are negative          Objective:      /74 (BP Location: Left arm, Patient Position: Sitting, Cuff Size: Large)   Ht 5' 7" (1 702 m)   Wt (!) 197 kg (434 lb 9 6 oz)   LMP  (LMP Unknown) Comment: 9/2019   BMI 68 07 kg/m²          Physical Exam   Constitutional: She is oriented to person, place, and time  She appears well-developed and well-nourished  Neurological: She is alert and oriented to person, place, and time  Skin: Skin is warm and dry  Psychiatric: She has a normal mood and affect  Nursing note and vitals reviewed      exam otherwise deferred

## 2020-01-13 NOTE — PATIENT INSTRUCTIONS
Follow up with PCP for hypothyroidism  Start provera once daily x 10 days  Return to office for annual exam  Discussed weight loss and appropriate diet  Exercise 150 minutes per week

## 2020-01-15 ENCOUNTER — OFFICE VISIT (OUTPATIENT)
Dept: FAMILY MEDICINE CLINIC | Facility: CLINIC | Age: 35
End: 2020-01-15
Payer: COMMERCIAL

## 2020-01-15 VITALS
HEART RATE: 78 BPM | TEMPERATURE: 98.3 F | WEIGHT: 293 LBS | HEIGHT: 67 IN | SYSTOLIC BLOOD PRESSURE: 144 MMHG | BODY MASS INDEX: 45.99 KG/M2 | DIASTOLIC BLOOD PRESSURE: 78 MMHG | OXYGEN SATURATION: 98 %

## 2020-01-15 DIAGNOSIS — D72.829 LEUKOCYTOSIS, UNSPECIFIED TYPE: ICD-10-CM

## 2020-01-15 DIAGNOSIS — E03.9 HYPOTHYROIDISM, UNSPECIFIED TYPE: Primary | ICD-10-CM

## 2020-01-15 DIAGNOSIS — R73.01 IMPAIRED FASTING GLUCOSE: ICD-10-CM

## 2020-01-15 PROCEDURE — 99214 OFFICE O/P EST MOD 30 MIN: CPT | Performed by: PHYSICIAN ASSISTANT

## 2020-01-15 PROCEDURE — 3008F BODY MASS INDEX DOCD: CPT | Performed by: PHYSICIAN ASSISTANT

## 2020-01-15 RX ORDER — LEVOTHYROXINE SODIUM 0.12 MG/1
125 TABLET ORAL
Qty: 90 TABLET | Refills: 0 | Status: SHIPPED | OUTPATIENT
Start: 2020-01-15 | End: 2020-03-26

## 2020-01-15 NOTE — PROGRESS NOTES
Assessment/Plan:    Problem List Items Addressed This Visit        Endocrine    Hypothyroidism - Primary    Relevant Medications    levothyroxine 125 mcg tablet    Other Relevant Orders    TSH, 3rd generation with Free T4 reflex    Impaired fasting glucose    Relevant Orders    HEMOGLOBIN A1C W/ EAG ESTIMATION    Insulin, fasting      Other Visit Diagnoses     Leukocytosis, unspecified type        Relevant Orders    CBC and differential    Body mass index (bmi) 60 0-69 9, adult ([facility])        Relevant Orders    Ambulatory referral to Weight Management           Diagnoses and all orders for this visit:    Hypothyroidism, unspecified type  -     TSH, 3rd generation with Free T4 reflex; Future  -     levothyroxine 125 mcg tablet; Take 1 tablet (125 mcg total) by mouth daily in the early morning    Leukocytosis, unspecified type  -     CBC and differential; Future    Impaired fasting glucose  -     HEMOGLOBIN A1C W/ EAG ESTIMATION; Future  -     Insulin, fasting; Future    Body mass index (bmi) 60 0-69 9, adult ([facility] )  -     Ambulatory referral to Weight Management; Future        I had a long discussion with Elizabeth and her mother about how we need to try to get her weight under control  Referral placed to weight management  Will increase levothyroxine from 112 to 125 mcg and recheck levels again in 6 weeks  Will also check CBC at that time to reevaluate leukocytosis  She needs to follow up with gynecology for heavy uterine bleeding  Subjective:      Patient ID: Aminata Whitt is a 27 y o  female  Mateo Womack is a 27year old female who is here to follow-up from the gynecologist  They did labs on her and noted that her TSH was elevated and to follow-up with her PCP  They also noted that her WBC was elevated and told her to follow-up  She has been experiencing heavy bleeding  She was put on provera for 10 days  She is 4 days in, and still having heavy bleeding  She is also concerned about gaining weight   She is not following a healthy diet and lives a sedentary lifestyle  She often eats peanut butter and jelly sandwiches or cereal for meals  I sent her to weight management previously, but she did not follow through because she has a history of anorexia  The following portions of the patient's history were reviewed and updated as appropriate:   She has a past medical history of Anorexia nervosa in remission, Anxiety, Back pain, Bipolar disorder ([facility] ), Depression, Esophageal reflux (8/15/2013), Hypertension, Hypothyroid, Idiopathic intracranial hypertension, Lumbar degenerative disc disease (5/8/2014), Migraine, Obesity, Obsessive-compulsive disorder, Overactive bladder, Psychiatric disorder, Restless leg syndrome, controlled, Seasonal allergies, and Suicide and [facility]inflicted [facility] ([facility] )  ,  does not have any pertinent problems on file  ,   has a past surgical history that includes Tonsillectomy; Sinus endoscopy; Dental surgery; and IR lumbar puncture (2/26/2019)  ,  family history includes Arthritis in her family; Breast cancer in her family and maternal grandmother; Cancer in her maternal grandfather, maternal grandmother, and paternal grandmother; Depression in her mother; Diabetes in her mother; Heart disease in her maternal grandmother; Hypertension in her brother, family, father, and mother; Hypothyroidism in her father; Mental illness in her father; Osteopenia in her family; Osteoporosis in her family; Other in her mother; Pneumonia in her maternal grandfather and paternal grandfather; Skin cancer in her maternal grandmother; Stroke in her maternal grandmother; Suicide Attempts in her mother; Thyroid disease in her father; Transient ischemic attack in her family  ,   reports that she has never smoked  She has never used smokeless tobacco  She reports that she drank alcohol  She reports that she does not use drugs  ,  is allergic to doxycycline and other     Current Outpatient Medications   Medication Sig Dispense Refill  buPROPion (WELLBUTRIN XL) 300 mg 24 hr tablet       cariprazine (VRAYLAR) 3 MG capsule Take by mouth daily       Cholecalciferol (VITAMIN D3) 5000 units CAPS Take 5,000 Units by mouth daily      clomiPRAMINE (ANAFRANIL) 50 mg capsule Take 100 mg by mouth 2 (two) times a day      HYDROcodone-acetaminophen (NORCO) 5-325 mg per tablet Take 1 tablet by mouth 2 (two) times a day as needed for pain (severe pain)      levothyroxine 125 mcg tablet Take 1 tablet (125 mcg total) by mouth daily in the early morning 90 tablet 0    loratadine (CLARITIN) 10 mg tablet Take 1 tablet (10 mg total) by mouth daily 90 tablet 3    LORazepam (ATIVAN) 1 mg tablet Take 1 mg by mouth 3 (three) times a day as needed for anxiety      medroxyPROGESTERone (PROVERA) 10 mg tablet Take 1 tablet (10 mg total) by mouth daily 10 tablet 0    meloxicam (MOBIC) 7 5 mg tablet TAKE 1 AFTER BREAKFAST AND DINNER DAILY AS NEEDED FOR PAIN AND SCIATICA  60 tablet 0    Multiple Vitamin (MULTIVITAMIN) capsule Take 1 capsule by mouth daily      omeprazole (PriLOSEC) 20 mg delayed release capsule Take 1 capsule (20 mg total) by mouth daily in the early morning 90 capsule 1    prochlorperazine (COMPAZINE) 10 mg tablet 1 tab at onset of migraine, can repeat in 8 hours, can take with triptan/NSAID 20 tablet 3    RESTASIS 0 05 % ophthalmic emulsion PLACE 1 DROP INTO BOTH EYES TWICE A DAY  3    rOPINIRole (REQUIP) 4 mg tablet TAKE 1 TABLET BY MOUTH TWICE A DAY 60 tablet 5    sertraline (ZOLOFT) 100 mg tablet Take 200 mg by mouth daily  0    topiramate (TOPAMAX) 100 mg tablet TAKE 1 TABLET BY MOUTH EVERYDAY AT BEDTIME (Patient taking differently: Take 200 mg by mouth daily TAKE 1 TABLET BY MOUTH EVERYDAY AT BEDTIME) 30 tablet 4    gabapentin (NEURONTIN) 600 MG tablet Take 0 5 tablets (300 mg total) by mouth daily in the early morning AND 1 5 tablets (900 mg total) daily at bedtime  Do all this for 90 days   180 tablet 3     No current facility-administered medications for this visit  Review of Systems   Constitutional: Positive for fatigue and unexpected weight change (weight gain)  Negative for chills, diaphoresis and fever  HENT: Negative for congestion, ear pain, postnasal drip, rhinorrhea, sneezing, sore throat and trouble swallowing  Dry mouth   Eyes: Negative for pain and visual disturbance  Respiratory: Negative for apnea, cough, shortness of breath and wheezing  Cardiovascular: Negative for chest pain and palpitations  Gastrointestinal: Negative for abdominal pain, constipation, diarrhea, nausea and vomiting  Genitourinary: Positive for menstrual problem and vaginal bleeding  Negative for dysuria and hematuria  Musculoskeletal: Positive for back pain  Negative for arthralgias, gait problem and myalgias  Neurological: Negative for dizziness, syncope, weakness, light-headedness, numbness and headaches  Psychiatric/Behavioral: Negative for suicidal ideas  The patient is not nervous/anxious  Objective:  Vitals:    01/15/20 1333   BP: 144/78   Pulse: 78   Temp: 98 3 °F (36 8 °C)   SpO2: 98%   Weight: (!) 198 kg (436 lb 3 2 oz)   Height: 5' 7" (1 702 m)     Body mass index is 68 32 kg/m²  Physical Exam   Constitutional: She is oriented to person, place, and time  She appears well-developed and well-nourished  HENT:   Head: Normocephalic and atraumatic  Right Ear: Tympanic membrane, external ear and ear canal normal    Left Ear: Tympanic membrane, external ear and ear canal normal    Nose: Nose normal    Mouth/Throat: Oropharynx is clear and moist and mucous membranes are normal  No oropharyngeal exudate, posterior oropharyngeal edema or posterior oropharyngeal erythema  Eyes: EOM are normal    Neck: Normal range of motion  Neck supple  Cardiovascular: Normal rate, regular rhythm and normal heart sounds  Exam reveals no gallop and no friction rub  No murmur heard    Pulmonary/Chest: Effort normal and breath sounds normal  No respiratory distress  She has no wheezes  She has no rales  Musculoskeletal: Normal range of motion  Lymphadenopathy:     She has no cervical adenopathy  Neurological: She is alert and oriented to person, place, and time  Skin: Skin is warm and dry  Psychiatric: Her behavior is normal  Judgment and thought content normal  Her mood appears anxious  Her affect is labile

## 2020-01-16 DIAGNOSIS — E03.9 HYPOTHYROIDISM, UNSPECIFIED TYPE: ICD-10-CM

## 2020-01-16 RX ORDER — LEVOTHYROXINE SODIUM 112 UG/1
112 TABLET ORAL
Qty: 30 TABLET | Refills: 0 | OUTPATIENT
Start: 2020-01-16

## 2020-01-21 ENCOUNTER — TELEPHONE (OUTPATIENT)
Dept: FAMILY MEDICINE CLINIC | Facility: CLINIC | Age: 35
End: 2020-01-21

## 2020-01-21 DIAGNOSIS — G43.719 INTRACTABLE CHRONIC MIGRAINE WITHOUT AURA AND WITHOUT STATUS MIGRAINOSUS: ICD-10-CM

## 2020-01-21 RX ORDER — TOPIRAMATE 100 MG/1
200 TABLET, FILM COATED ORAL DAILY
Qty: 60 TABLET | Refills: 6 | Status: SHIPPED | OUTPATIENT
Start: 2020-01-21 | End: 2020-07-16 | Stop reason: SDUPTHER

## 2020-01-21 NOTE — TELEPHONE ENCOUNTER
Pt states she was in to see you last Wed to discuss a few issues and forgot to mention to you that she has chronic sinus issues and wbc is high  She has a sinus infection now  Asking if you would send a prescription to [address]  for her or does she need to come in for another appt?

## 2020-01-30 NOTE — PROGRESS NOTES
Assessment/Plan:     Continue provera as directed  Call prior to running out of medication  Discussed plan of care and benefits, risks and alternatives of treatment  Desires Mirena IUD  We will check her coverage and let her know  Only protected sex until after insertion  Call with any worsening of symptoms      Diagnoses and all orders for this visit:    Menorrhagia with irregular cycle  -     medroxyPROGESTERone (PROVERA) 10 mg tablet; Take 1 tablet (10 mg total) by mouth daily    Other orders  -     gabapentin (NEURONTIN) 600 MG tablet; TAKE 1/2 TABLET DAILY IN AM AND 1 AND 1/2 TABLET AT BEDTIME              Subjective:      Patient ID: Dakota Daigle is a 27 y o  female  Dakota Daigle is a 27 y o  female who is here today for a problem visit  C/o vaginal bleeding most days since September with varied flow but mostly moderate until mid December  Heavier most days since mid December  Treated with provera mid January  The bleeding stopping on day 6 of her provera for  4 days and then became heavy again  She is using tampons and pads changing them every 1-3 hours  Normal pelvic US on 1/2020  Denies pelvic pain, except for occasional cramps  Admits to increased acne Since mid December 1/9/2020 Normal H/H=12/39 , elevated TSH of 4 5 and low FT4 of 0 72  She is following with her PCP for her hypothyroidism  Taking 125 mcg of levothyroxine each morning  She will be evaluated late next month to check her TSH again  Requesting treatment for the bleeding  Not currently sexually active  The following portions of the patient's history were reviewed and updated as appropriate: allergies, current medications, past family history, past medical history, past social history, past surgical history and problem list     Review of Systems   Constitutional: Negative  Cardiovascular: Negative for chest pain, palpitations and leg swelling     Gastrointestinal: Negative for abdominal pain, constipation, diarrhea, nausea and vomiting  Genitourinary: Positive for menstrual problem and vaginal bleeding  Negative for difficulty urinating, dysuria, flank pain, frequency, pelvic pain, urgency and vaginal discharge  Musculoskeletal: Negative for back pain  Skin: Negative  Neurological: Negative for weakness, light-headedness and headaches  Psychiatric/Behavioral: Negative  All other systems reviewed and are negative  Objective:      /80 (BP Location: Left arm, Patient Position: Sitting, Cuff Size: Standard)   Pulse (!) 109   Wt (!) 194 kg (428 lb 6 4 oz)   BMI 67 10 kg/m²          Physical Exam   Constitutional: She is oriented to person, place, and time  She appears well-developed and well-nourished  Physical exam limited by habitus   Genitourinary: Uterus normal  Rectal exam shows no external hemorrhoid  Pelvic exam was performed with patient supine  No labial fusion  There is no rash, tenderness, lesion or injury on the right labia  There is no rash, tenderness, lesion or injury on the left labia  Cervix exhibits no motion tenderness, no discharge and no friability  Right adnexum displays no mass, no tenderness and no fullness  Left adnexum displays no mass, no tenderness and no fullness  There is bleeding (moderately heavy) in the vagina  No erythema or tenderness in the vagina  No foreign body in the vagina  No signs of injury around the vagina  No vaginal discharge found  Genitourinary Comments: Physical exam limited by habitus   Musculoskeletal: Normal range of motion  Lymphadenopathy: No inguinal adenopathy noted on the right or left side  Right: No inguinal adenopathy present  Left: No inguinal adenopathy present  Neurological: She is alert and oriented to person, place, and time  Skin: Skin is warm and dry  Psychiatric: She has a normal mood and affect  Nursing note and vitals reviewed

## 2020-01-31 ENCOUNTER — TELEPHONE (OUTPATIENT)
Dept: GYNECOLOGY | Facility: CLINIC | Age: 35
End: 2020-01-31

## 2020-01-31 ENCOUNTER — OFFICE VISIT (OUTPATIENT)
Dept: GYNECOLOGY | Facility: CLINIC | Age: 35
End: 2020-01-31
Payer: COMMERCIAL

## 2020-01-31 VITALS
SYSTOLIC BLOOD PRESSURE: 148 MMHG | WEIGHT: 293 LBS | DIASTOLIC BLOOD PRESSURE: 80 MMHG | HEART RATE: 109 BPM | BODY MASS INDEX: 67.1 KG/M2

## 2020-01-31 DIAGNOSIS — N92.1 MENORRHAGIA WITH IRREGULAR CYCLE: Primary | ICD-10-CM

## 2020-01-31 PROCEDURE — 99214 OFFICE O/P EST MOD 30 MIN: CPT | Performed by: NURSE PRACTITIONER

## 2020-01-31 RX ORDER — MEDROXYPROGESTERONE ACETATE 10 MG/1
10 TABLET ORAL DAILY
Qty: 20 TABLET | Refills: 0 | Status: SHIPPED | OUTPATIENT
Start: 2020-01-31 | End: 2020-06-02 | Stop reason: ALTCHOICE

## 2020-01-31 RX ORDER — GABAPENTIN 600 MG/1
TABLET ORAL
COMMUNITY
Start: 2020-01-05

## 2020-01-31 NOTE — TELEPHONE ENCOUNTER
Medicare part B will cover 80% and medicaid will cover 20% only if medically necessary not for contraceptive purposes   ZDE#[id number]    [facility] will cover it at 100% ref # Sylvie [id number]  1/31/2020

## 2020-01-31 NOTE — PATIENT INSTRUCTIONS
Continue provera as directed  Call prior to running out of medication  Discussed plan of care and benefits, risks and alternatives of treatment  Desires Mirena IUD  We will check her coverage and let her know     Only protected sex until after insertion  Call with any worsening of symptoms

## 2020-02-03 ENCOUNTER — TELEPHONE (OUTPATIENT)
Dept: SLEEP CENTER | Facility: CLINIC | Age: 35
End: 2020-02-03

## 2020-02-03 NOTE — TELEPHONE ENCOUNTER
----- Message from Demetrius Valenzuela MD sent at 1/28/2020  4:09 PM EST -----  Approved  ----- Message -----  From: Boy Boyce MA  Sent: 1/27/2020  12:47 PM EST  To: [facility] Provider    This sleep study needs approval      If approved please sign and return to clerical pool  If denied please include reasons why  Also provide alternative testing if warranted  Please sign and return to clerical pool

## 2020-02-05 ENCOUNTER — APPOINTMENT (EMERGENCY)
Dept: CT IMAGING | Facility: HOSPITAL | Age: 35
End: 2020-02-05
Payer: COMMERCIAL

## 2020-02-05 ENCOUNTER — HOSPITAL ENCOUNTER (EMERGENCY)
Facility: HOSPITAL | Age: 35
Discharge: HOME | End: 2020-02-05
Attending: EMERGENCY MEDICINE | Admitting: EMERGENCY MEDICINE
Payer: COMMERCIAL

## 2020-02-05 VITALS
HEART RATE: 69 BPM | WEIGHT: 293 LBS | SYSTOLIC BLOOD PRESSURE: 172 MMHG | BODY MASS INDEX: 44.41 KG/M2 | DIASTOLIC BLOOD PRESSURE: 89 MMHG | RESPIRATION RATE: 16 BRPM | OXYGEN SATURATION: 100 % | HEIGHT: 68 IN | TEMPERATURE: 98.3 F

## 2020-02-05 DIAGNOSIS — R10.9 ABDOMINAL PAIN: Primary | ICD-10-CM

## 2020-02-05 DIAGNOSIS — K59.00 CONSTIPATION: ICD-10-CM

## 2020-02-05 LAB
ALBUMIN SERPL BCP-MCNC: 3.4 G/DL (ref 3.5–5)
ALP SERPL-CCNC: 112 U/L (ref 46–116)
ALT SERPL W P-5'-P-CCNC: 26 U/L (ref 12–78)
ANION GAP SERPL CALCULATED.3IONS-SCNC: 8 MMOL/L (ref 4–13)
APTT PPP: 30 SECONDS (ref 23–37)
AST SERPL W P-5'-P-CCNC: 14 U/L (ref 5–45)
BACTERIA UR QL AUTO: ABNORMAL /HPF
BASOPHILS # BLD AUTO: 0.08 THOUSANDS/ΜL (ref 0–0.1)
BASOPHILS NFR BLD AUTO: 1 % (ref 0–1)
BILIRUB SERPL-MCNC: 0.1 MG/DL (ref 0.2–1)
BILIRUB UR QL STRIP: NEGATIVE
BUN SERPL-MCNC: 17 MG/DL (ref 5–25)
CALCIUM SERPL-MCNC: 9 MG/DL (ref 8.3–10.1)
CHLORIDE SERPL-SCNC: 103 MMOL/L (ref 100–108)
CLARITY UR: CLEAR
CO2 SERPL-SCNC: 25 MMOL/L (ref 21–32)
COLOR UR: YELLOW
CREAT SERPL-MCNC: 0.83 MG/DL (ref 0.6–1.3)
EOSINOPHIL # BLD AUTO: 0.3 THOUSAND/ΜL (ref 0–0.61)
EOSINOPHIL NFR BLD AUTO: 3 % (ref 0–6)
ERYTHROCYTE [DISTWIDTH] IN BLOOD BY AUTOMATED COUNT: 15.1 % (ref 11.6–15.1)
EXT PREG TEST URINE: NEGATIVE
EXT. CONTROL ED NAV: NORMAL
GFR SERPL CREATININE-BSD FRML MDRD: 92 ML/MIN/1.73SQ M
GLUCOSE SERPL-MCNC: 95 MG/DL (ref 65–140)
GLUCOSE UR STRIP-MCNC: NEGATIVE MG/DL
HCT VFR BLD AUTO: 40 % (ref 34.8–46.1)
HGB BLD-MCNC: 12.4 G/DL (ref 11.5–15.4)
HGB UR QL STRIP.AUTO: NEGATIVE
IMM GRANULOCYTES # BLD AUTO: 0.05 THOUSAND/UL (ref 0–0.2)
IMM GRANULOCYTES NFR BLD AUTO: 1 % (ref 0–2)
INR PPP: 1.07 (ref 0.84–1.19)
KETONES UR STRIP-MCNC: NEGATIVE MG/DL
LACTATE SERPL-SCNC: 0.9 MMOL/L (ref 0.5–2)
LEUKOCYTE ESTERASE UR QL STRIP: ABNORMAL
LIPASE SERPL-CCNC: 74 U/L (ref 73–393)
LYMPHOCYTES # BLD AUTO: 3.72 THOUSANDS/ΜL (ref 0.6–4.47)
LYMPHOCYTES NFR BLD AUTO: 34 % (ref 14–44)
MAGNESIUM SERPL-MCNC: 1.9 MG/DL (ref 1.6–2.6)
MCH RBC QN AUTO: 27.9 PG (ref 26.8–34.3)
MCHC RBC AUTO-ENTMCNC: 31 G/DL (ref 31.4–37.4)
MCV RBC AUTO: 90 FL (ref 82–98)
MONOCYTES # BLD AUTO: 0.77 THOUSAND/ΜL (ref 0.17–1.22)
MONOCYTES NFR BLD AUTO: 7 % (ref 4–12)
NEUTROPHILS # BLD AUTO: 6.03 THOUSANDS/ΜL (ref 1.85–7.62)
NEUTS SEG NFR BLD AUTO: 54 % (ref 43–75)
NITRITE UR QL STRIP: NEGATIVE
NON-SQ EPI CELLS URNS QL MICRO: ABNORMAL /HPF
NRBC BLD AUTO-RTO: 0 /100 WBCS
PH UR STRIP.AUTO: 5.5 [PH]
PLATELET # BLD AUTO: 305 THOUSANDS/UL (ref 149–390)
PMV BLD AUTO: 9.2 FL (ref 8.9–12.7)
POTASSIUM SERPL-SCNC: 3.7 MMOL/L (ref 3.5–5.3)
PROT SERPL-MCNC: 7.4 G/DL (ref 6.4–8.2)
PROT UR STRIP-MCNC: NEGATIVE MG/DL
PROTHROMBIN TIME: 13.9 SECONDS (ref 11.6–14.5)
RBC # BLD AUTO: 4.44 MILLION/UL (ref 3.81–5.12)
RBC #/AREA URNS AUTO: ABNORMAL /HPF
SODIUM SERPL-SCNC: 136 MMOL/L (ref 136–145)
SP GR UR STRIP.AUTO: 1.02 (ref 1–1.03)
UROBILINOGEN UR QL STRIP.AUTO: 0.2 E.U./DL
WBC # BLD AUTO: 10.95 THOUSAND/UL (ref 4.31–10.16)
WBC #/AREA URNS AUTO: ABNORMAL /HPF

## 2020-02-05 PROCEDURE — 99284 EMERGENCY DEPT VISIT MOD MDM: CPT | Performed by: EMERGENCY MEDICINE

## 2020-02-05 PROCEDURE — 85025 COMPLETE CBC W/AUTO DIFF WBC: CPT | Performed by: EMERGENCY MEDICINE

## 2020-02-05 PROCEDURE — 81001 URINALYSIS AUTO W/SCOPE: CPT | Performed by: EMERGENCY MEDICINE

## 2020-02-05 PROCEDURE — 83605 ASSAY OF LACTIC ACID: CPT | Performed by: EMERGENCY MEDICINE

## 2020-02-05 PROCEDURE — 74177 CT ABD & PELVIS W/CONTRAST: CPT

## 2020-02-05 PROCEDURE — 80053 COMPREHEN METABOLIC PANEL: CPT | Performed by: EMERGENCY MEDICINE

## 2020-02-05 PROCEDURE — 85730 THROMBOPLASTIN TIME PARTIAL: CPT | Performed by: EMERGENCY MEDICINE

## 2020-02-05 PROCEDURE — 81025 URINE PREGNANCY TEST: CPT | Performed by: EMERGENCY MEDICINE

## 2020-02-05 PROCEDURE — 36415 COLL VENOUS BLD VENIPUNCTURE: CPT | Performed by: EMERGENCY MEDICINE

## 2020-02-05 PROCEDURE — 96374 THER/PROPH/DIAG INJ IV PUSH: CPT

## 2020-02-05 PROCEDURE — 83735 ASSAY OF MAGNESIUM: CPT | Performed by: EMERGENCY MEDICINE

## 2020-02-05 PROCEDURE — 83690 ASSAY OF LIPASE: CPT | Performed by: EMERGENCY MEDICINE

## 2020-02-05 PROCEDURE — 85610 PROTHROMBIN TIME: CPT | Performed by: EMERGENCY MEDICINE

## 2020-02-05 PROCEDURE — 99284 EMERGENCY DEPT VISIT MOD MDM: CPT

## 2020-02-05 RX ORDER — DEXTROAMPHETAMINE SACCHARATE, AMPHETAMINE ASPARTATE, DEXTROAMPHETAMINE SULFATE AND AMPHETAMINE SULFATE 2.5; 2.5; 2.5; 2.5 MG/1; MG/1; MG/1; MG/1
10 TABLET ORAL
COMMUNITY
End: 2020-06-19

## 2020-02-05 RX ORDER — KETOROLAC TROMETHAMINE 30 MG/ML
30 INJECTION, SOLUTION INTRAMUSCULAR; INTRAVENOUS ONCE
Status: COMPLETED | OUTPATIENT
Start: 2020-02-05 | End: 2020-02-05

## 2020-02-05 RX ORDER — ONDANSETRON 2 MG/ML
4 INJECTION INTRAMUSCULAR; INTRAVENOUS ONCE
Status: DISCONTINUED | OUTPATIENT
Start: 2020-02-05 | End: 2020-02-06 | Stop reason: HOSPADM

## 2020-02-05 RX ADMIN — IOHEXOL 100 ML: 350 INJECTION, SOLUTION INTRAVENOUS at 22:35

## 2020-02-05 RX ADMIN — KETOROLAC TROMETHAMINE 30 MG: 30 INJECTION, SOLUTION INTRAMUSCULAR at 20:38

## 2020-02-06 NOTE — ED PROVIDER NOTES
History  Chief Complaint   Patient presents with    Constipation     Patient recently started taking Adderall  Now she has not used the bathroom in 10 days  She has gone "a little bit" but now she is having left quadrant pain "that feels like the left side is stretching "     HPI     Pt presents from home, hx of bipolar, OCD, prior diverticulitis in the past, presents with left sided abdominal pain, constant, no alleviating/aggravating factors, currently present, began yesterday but worsened today  Pt states she has had constipation  She has been taking stool softeners, laxatives and enemas with some success  Pt o/w denies any symptoms  Pt denies ha, fevers, cough, cp, sob, n/v/d/c, dysuria, focal def or syncope  Prior to Admission Medications   Prescriptions Last Dose Informant Patient Reported? Taking?    Cholecalciferol (VITAMIN D3) 5000 units CAPS  Self Yes Yes   Sig: Take 5,000 Units by mouth daily   HYDROcodone-acetaminophen (NORCO) 5-325 mg per tablet  Self Yes Yes   Sig: Take 1 tablet by mouth 2 (two) times a day as needed for pain (severe pain)   LORazepam (ATIVAN) 1 mg tablet  Self Yes Yes   Sig: Take 1 mg by mouth 3 (three) times a day as needed for anxiety   Multiple Vitamin (MULTIVITAMIN) capsule  Self Yes Yes   Sig: Take 1 capsule by mouth daily   RESTASIS 0 05 % ophthalmic emulsion  Self Yes Yes   Sig: PLACE 1 DROP INTO BOTH EYES TWICE A DAY   amphetamine-dextroamphetamine (ADDERALL) 10 mg tablet   Yes Yes   Sig: Take 10 mg by mouth 2 (two) times a day   buPROPion (WELLBUTRIN XL) 300 mg 24 hr tablet  Self Yes Yes   cariprazine (VRAYLAR) 3 MG capsule  Self Yes Yes   Sig: Take by mouth daily    clomiPRAMINE (ANAFRANIL) 50 mg capsule  Self Yes Yes   Sig: Take 100 mg by mouth 2 (two) times a day   gabapentin (NEURONTIN) 600 MG tablet   Yes Yes   Sig: TAKE 1/2 TABLET DAILY IN AM AND 1 AND 1/2 TABLET AT BEDTIME   levothyroxine 125 mcg tablet   No Yes   Sig: Take 1 tablet (125 mcg total) by mouth daily in the early morning   loratadine (CLARITIN) 10 mg tablet  Self No Yes   Sig: Take 1 tablet (10 mg total) by mouth daily   medroxyPROGESTERone (PROVERA) 10 mg tablet   No Yes   Sig: Take 1 tablet (10 mg total) by mouth daily   meloxicam (MOBIC) 7 5 mg tablet  Self No Yes   Sig: TAKE 1 AFTER BREAKFAST AND DINNER DAILY AS NEEDED FOR PAIN AND SCIATICA     omeprazole (PriLOSEC) 20 mg delayed release capsule  Self No Yes   Sig: Take 1 capsule (20 mg total) by mouth daily in the early morning   prochlorperazine (COMPAZINE) 10 mg tablet  Self No Yes   Si tab at onset of migraine, can repeat in 8 hours, can take with triptan/NSAID   rOPINIRole (REQUIP) 4 mg tablet  Self No Yes   Sig: TAKE 1 TABLET BY MOUTH TWICE A DAY   sertraline (ZOLOFT) 100 mg tablet  Self Yes Yes   Sig: Take 200 mg by mouth daily   topiramate (TOPAMAX) 100 mg tablet   No Yes   Sig: Take 2 tablets (200 mg total) by mouth daily      Facility-Administered Medications: None       Past Medical History:   Diagnosis Date    Anorexia nervosa in remission     Anxiety     Back pain     Bipolar disorder ([facility] [address] )     Depression     Esophageal reflux 8/15/2013    Hypertension     Hypothyroid     Idiopathic intracranial hypertension     Lumbar degenerative disc disease 2014    Migraine     Obesity     Obsessive-compulsive disorder     Overactive bladder     Psychiatric disorder     Restless leg syndrome, controlled     Seasonal allergies     Suicide and self-inflicted injury [facility])        Past Surgical History:   Procedure Laterality Date    DENTAL SURGERY      IR LUMBAR PUNCTURE  2019    SINUS ENDOSCOPY      TONSILLECTOMY         Family History   Problem Relation Age of Onset    Depression Mother     Suicide Attempts Mother     Hypertension Mother     Diabetes Mother     Other Mother         bicuspid aortic valve    Hypertension Father     Hypothyroidism Father     Mental illness Father     Thyroid disease Father    Luis Manuel Garcia Hypertension Brother     Cancer Maternal Grandmother     Heart disease Maternal Grandmother     Stroke Maternal Grandmother     Breast cancer Maternal Grandmother     Skin cancer Maternal Grandmother     Pneumonia Maternal Grandfather     Cancer Maternal Grandfather     Cancer Paternal Grandmother     Pneumonia Paternal Grandfather     Arthritis Family     Breast cancer Family     Osteopenia Family     Osteoporosis Family     Hypertension Family     Transient ischemic attack Family      I have reviewed and agree with the history as documented  Social History     Tobacco Use    Smoking status: Never Smoker    Smokeless tobacco: Never Used   Substance Use Topics    Alcohol use: Not Currently    Drug use: No        Review of Systems   Constitutional: Negative for activity change, appetite change, diaphoresis, fatigue and fever  HENT: Negative for congestion, facial swelling, mouth sores and trouble swallowing  Eyes: Negative for photophobia, discharge and visual disturbance  Respiratory: Negative for apnea, cough, shortness of breath and wheezing  Cardiovascular: Negative for chest pain and leg swelling  Gastrointestinal: Positive for abdominal pain and constipation  Negative for diarrhea, nausea and vomiting  Endocrine: Negative for heat intolerance and polydipsia  Genitourinary: Negative for dysuria, flank pain, frequency and hematuria  Musculoskeletal: Negative for back pain, gait problem, myalgias and neck pain  Skin: Negative for rash and wound  Allergic/Immunologic: Negative for immunocompromised state  Neurological: Negative for dizziness, syncope, weakness, light-headedness and headaches  Hematological: Negative for adenopathy  Psychiatric/Behavioral: Negative for agitation, confusion and self-injury  The patient is not nervous/anxious  Physical Exam  Physical Exam   Constitutional: She is oriented to person, place, and time   She appears well-developed and well-nourished  No distress  HENT:   Head: Normocephalic and atraumatic  Right Ear: External ear normal    Left Ear: External ear normal    Nose: Nose normal    Mouth/Throat: Oropharynx is clear and moist  No oropharyngeal exudate  Eyes: Pupils are equal, round, and reactive to light  Conjunctivae and EOM are normal  Right eye exhibits no discharge  Left eye exhibits no discharge  No scleral icterus  Neck: Normal range of motion  Neck supple  No JVD present  No tracheal deviation present  No thyromegaly present  Cardiovascular: Normal rate, regular rhythm and normal heart sounds  No murmur heard  Pulmonary/Chest: Effort normal and breath sounds normal  No stridor  No respiratory distress  She has no wheezes  She has no rales  She exhibits no tenderness  Abdominal: Soft  Bowel sounds are normal  She exhibits no distension and no mass  There is tenderness  There is no rebound and no guarding  Mild TTP in the left upper and lower abdomen  +vol guarding  No rebound, rigidity  +BS   Musculoskeletal: Normal range of motion  She exhibits no edema, tenderness or deformity  Lymphadenopathy:     She has no cervical adenopathy  Neurological: She is alert and oriented to person, place, and time  She has normal reflexes  She displays normal reflexes  No cranial nerve deficit  She exhibits normal muscle tone  Coordination normal    Skin: Skin is warm and dry  No rash noted  She is not diaphoretic  No erythema  No pallor  Psychiatric: She has a normal mood and affect  Her behavior is normal  Judgment and thought content normal    Nursing note and vitals reviewed        Vital Signs  ED Triage Vitals [02/05/20 1950]   Temperature Pulse Respirations Blood Pressure SpO2   98 3 °F (36 8 °C) 74 14 169/91 100 %      Temp Source Heart Rate Source Patient Position - Orthostatic VS BP Location FiO2 (%)   Temporal Monitor Sitting Right arm --      Pain Score       5           Vitals:    02/05/20 1950   BP: 169/91 Pulse: 74   Patient Position - Orthostatic VS: Sitting         Visual Acuity      ED Medications  Medications   ondansetron (ZOFRAN) injection 4 mg (4 mg Intravenous Not Given 2/5/20 2040)   ketorolac (TORADOL) injection 30 mg (30 mg Intravenous Given 2/5/20 2038)   iohexol (OMNIPAQUE) 240 MG/ML solution 50 mL (50 mL Oral Given 2/5/20 2059)       Diagnostic Studies  Results Reviewed     Procedure Component Value Units Date/Time    Urine Microscopic [[id number]]  (Abnormal) Collected:  02/05/20 2157    Lab Status:  Final result Specimen:  Urine, Clean Catch Updated:  02/05/20 2213     RBC, UA 0-1 /hpf      WBC, UA 4-10 /hpf      Epithelial Cells Moderate /hpf      Bacteria, UA Occasional /hpf     UA w Reflex to Microscopic w Reflex to Culture [[id number]]  (Abnormal) Collected:  02/05/20 2157    Lab Status:  Final result Specimen:  Urine, Clean Catch Updated:  02/05/20 2204     Color, UA Yellow     Clarity, UA Clear     Specific Gravity, UA 1 025     pH, UA 5 5     Leukocytes, UA Trace     Nitrite, UA Negative     Protein, UA Negative mg/dl      Glucose, UA Negative mg/dl      Ketones, UA Negative mg/dl      Urobilinogen, UA 0 2 E U /dl      Bilirubin, UA Negative     Blood, UA Negative    POCT pregnancy, urine [[id number]]  (Normal) Resulted:  02/05/20 2158    Lab Status:  Final result Updated:  02/05/20 2158     EXT PREG TEST UR (Ref: Negative) Negative     Control Valid    Lactic acid, plasma [[id number]]  (Normal) Collected:  02/05/20 2033    Lab Status:  Final result Specimen:  Blood from Arm, Left Updated:  02/05/20 2058     LACTIC ACID 0 9 mmol/L     Narrative:       Result may be elevated if tourniquet was used during collection      Comprehensive metabolic panel [[id number]]  (Abnormal) Collected:  02/05/20 2033    Lab Status:  Final result Specimen:  Blood from Arm, Left Updated:  02/05/20 2055     Sodium 136 mmol/L      Potassium 3 7 mmol/L      Chloride 103 mmol/L      CO2 25 mmol/L      ANION GAP 8 mmol/L BUN 17 mg/dL      Creatinine 0 83 mg/dL      Glucose 95 mg/dL      Calcium 9 0 mg/dL      AST 14 U/L      ALT 26 U/L      Alkaline Phosphatase 112 U/L      Total Protein 7 4 g/dL      Albumin 3 4 g/dL      Total Bilirubin 0 10 mg/dL      eGFR 92 ml/min/1 73sq m     Narrative:       [place] guidelines for Chronic Kidney Disease (CKD):     Stage 1 with normal or high GFR (GFR > 90 mL/min/1 73 square meters)    Stage 2 Mild CKD (GFR = 60-89 mL/min/1 73 square meters)    Stage 3A Moderate CKD (GFR = 45-59 mL/min/1 73 square meters)    Stage 3B Moderate CKD (GFR = 30-44 mL/min/1 73 square meters)    Stage 4 Severe CKD (GFR = 15-29 mL/min/1 73 square meters)    Stage 5 End Stage CKD (GFR <15 mL/min/1 73 square meters)  Note: GFR calculation is accurate only with a steady state creatinine    Magnesium [[id number]]  (Normal) Collected:  02/05/20 2033    Lab Status:  Final result Specimen:  Blood from Arm, Left Updated:  02/05/20 2049     Magnesium 1 9 mg/dL     Lipase [[id number]]  (Normal) Collected:  02/05/20 2033    Lab Status:  Final result Specimen:  Blood from Arm, Left Updated:  02/05/20 2049     Lipase 74 u/L     Protime-INR [[id number]]  (Normal) Collected:  02/05/20 2033    Lab Status:  Final result Specimen:  Blood from Arm, Left Updated:  02/05/20 2049     Protime 13 9 seconds      INR 1 07    APTT [[id number]]  (Normal) Collected:  02/05/20 2033    Lab Status:  Final result Specimen:  Blood from Arm, Left Updated:  02/05/20 2049     PTT 30 seconds     CBC and differential [[id number]]  (Abnormal) Collected:  02/05/20 2033    Lab Status:  Final result Specimen:  Blood from Arm, Left Updated:  02/05/20 2039     WBC 10 95 Thousand/uL      RBC 4 44 Million/uL      Hemoglobin 12 4 g/dL      Hematocrit 40 0 %      MCV 90 fL      MCH 27 9 pg      MCHC 31 0 g/dL      RDW 15 1 %      MPV 9 2 fL      Platelets 012 Thousands/uL      nRBC 0 /100 WBCs      Neutrophils Relative 54 %      Immat GRANS % 1 %      Lymphocytes Relative 34 %      Monocytes Relative 7 %      Eosinophils Relative 3 %      Basophils Relative 1 %      Neutrophils Absolute 6 03 Thousands/µL      Immature Grans Absolute 0 05 Thousand/uL      Lymphocytes Absolute 3 72 Thousands/µL      Monocytes Absolute 0 77 Thousand/µL      Eosinophils Absolute 0 30 Thousand/µL      Basophils Absolute 0 08 Thousands/µL                  CT abdomen pelvis with contrast    (Results Pending)              Procedures  Procedures         ED Course       10:24 PM - I re-evaluated the patient and she is improved from prior  Awaiting results of the CT scan at this time  Pt will be signed out to the [facility] ED provider  MDM  Number of Diagnoses or Management Options  Diagnosis management comments: IMP: constipation versus medication side affect, electrolyte abnormality, dehydration, anxiety  Consider colitis, kidney stone  Doubt acs, pe, dissection, tamponade, cva, bacteremia, surgical abd process    Plan: cardiac labs, ekg, ct abd/pelvis, give ivf and iv anti-inflammatory meds prn   - urinalysis trace LE but likely more of a contaminant  - mild leukocytosis 10 9  - ct abd/pelvis pending  - Pt will be signed out to the [facility] ED provider       Amount and/or Complexity of Data Reviewed  Clinical lab tests: ordered and reviewed  Tests in the radiology section of CPT®: ordered and reviewed  Tests in the medicine section of CPT®: ordered and reviewed  Decide to obtain previous medical records or to obtain history from someone other than the patient: yes  Review and summarize past medical records: yes  Independent visualization of images, tracings, or specimens: yes    Risk of Complications, Morbidity, and/or Mortality  Presenting problems: high  Diagnostic procedures: high  Management options: high    Patient Progress  Patient progress: improved        Disposition  Final diagnoses:   Abdominal Pain     ED Disposition     None Follow-up Information    None         Patient's Medications   Discharge Prescriptions    No medications on file     No discharge procedures on file      ED Provider  Electronically Signed by           Taylor Schofield DO  02/05/20 6075

## 2020-02-06 NOTE — ED CARE HANDOFF
Emergency Department Sign Out Note        Sign out and transfer of care from Dr Victoria Esters  See Separate Emergency Department note  The patient, Maryam Smart, was evaluated by the previous provider for abdominal pain  Patient was complaining of constipation left lower quadrant pain  She did have a history of diverticulitis  She had no fevers, chills, nausea vomiting    Workup Completed:  Labs, Urine    ED Course / Workup Pending (followup):  CT     Patient 27year old female with abdominal pain pending CT  Labs reviewed and stable   No evidence of sepsis     Labs Reviewed   CBC AND DIFFERENTIAL - Abnormal       Result Value Ref Range Status    WBC 10 95 (*) 4 31 - 10 16 Thousand/uL Final    RBC 4 44  3 81 - 5 12 Million/uL Final    Hemoglobin 12 4  11 5 - 15 4 g/dL Final    Hematocrit 40 0  34 8 - 46 1 % Final    MCV 90  82 - 98 fL Final    MCH 27 9  26 8 - 34 3 pg Final    MCHC 31 0 (*) 31 4 - 37 4 g/dL Final    RDW 15 1  11 6 - 15 1 % Final    MPV 9 2  8 9 - 12 7 fL Final    Platelets 996  396 - 390 Thousands/uL Final    nRBC 0  /100 WBCs Final    Neutrophils Relative 54  43 - 75 % Final    Immat GRANS % 1  0 - 2 % Final    Lymphocytes Relative 34  14 - 44 % Final    Monocytes Relative 7  4 - 12 % Final    Eosinophils Relative 3  0 - 6 % Final    Basophils Relative 1  0 - 1 % Final    Neutrophils Absolute 6 03  1 85 - 7 62 Thousands/µL Final    Immature Grans Absolute 0 05  0 00 - 0 20 Thousand/uL Final    Lymphocytes Absolute 3 72  0 60 - 4 47 Thousands/µL Final    Monocytes Absolute 0 77  0 17 - 1 22 Thousand/µL Final    Eosinophils Absolute 0 30  0 00 - 0 61 Thousand/µL Final    Basophils Absolute 0 08  0 00 - 0 10 Thousands/µL Final   COMPREHENSIVE METABOLIC PANEL - Abnormal    Sodium 136  136 - 145 mmol/L Final    Potassium 3 7  3 5 - 5 3 mmol/L Final    Chloride 103  100 - 108 mmol/L Final    CO2 25  21 - 32 mmol/L Final    ANION GAP 8  4 - 13 mmol/L Final    BUN 17  5 - 25 mg/dL Final    Creatinine 0  83  0 60 - 1 30 mg/dL Final    Comment: Standardized to IDMS reference method    Glucose 95  65 - 140 mg/dL Final    Comment:   If the patient is fasting, the ADA then defines impaired fasting glucose as > 100 mg/dL and diabetes as > or equal to 123 mg/dL  Specimen collection should occur prior to Sulfasalazine administration due to the potential for falsely depressed results  Specimen collection should occur prior to Sulfapyridine administration due to the potential for falsely elevated results  Calcium 9 0  8 3 - 10 1 mg/dL Final    AST 14  5 - 45 U/L Final    Comment:   Specimen collection should occur prior to Sulfasalazine administration due to the potential for falsely depressed results  ALT 26  12 - 78 U/L Final    Comment:   Specimen collection should occur prior to Sulfasalazine administration due to the potential for falsely depressed results       Alkaline Phosphatase 112  46 - 116 U/L Final    Total Protein 7 4  6 4 - 8 2 g/dL Final    Albumin 3 4 (*) 3 5 - 5 0 g/dL Final    Total Bilirubin 0 10 (*) 0 20 - 1 00 mg/dL Final    eGFR 92  ml/min/1 73sq m Final    Narrative:     [place] guidelines for Chronic Kidney Disease (CKD):     Stage 1 with normal or high GFR (GFR > 90 mL/min/1 73 square meters)    Stage 2 Mild CKD (GFR = 60-89 mL/min/1 73 square meters)    Stage 3A Moderate CKD (GFR = 45-59 mL/min/1 73 square meters)    Stage 3B Moderate CKD (GFR = 30-44 mL/min/1 73 square meters)    Stage 4 Severe CKD (GFR = 15-29 mL/min/1 73 square meters)    Stage 5 End Stage CKD (GFR <15 mL/min/1 73 square meters)  Note: GFR calculation is accurate only with a steady state creatinine   UA W REFLEX TO MICROSCOPIC WITH REFLEX TO CULTURE - Abnormal    Color, UA Yellow   Final    Clarity, UA Clear   Final    Specific Gravity, UA 1 025  1 003 - 1 030 Final    pH, UA 5 5  4 5, 5 0, 5 5, 6 0, 6 5, 7 0, 7 5, 8 0 Final    Leukocytes, UA Trace (*) Negative Final    Nitrite, UA Negative  Negative Final    Protein, UA Negative  Negative mg/dl Final    Glucose, UA Negative  Negative mg/dl Final    Ketones, UA Negative  Negative mg/dl Final    Urobilinogen, UA 0 2  0 2, 1 0 E U /dl E U /dl Final    Bilirubin, UA Negative  Negative Final    Blood, UA Negative  Negative Final   URINE MICROSCOPIC - Abnormal    RBC, UA 0-1 (*) None Seen, 0-5 /hpf Final    WBC, UA 4-10 (*) None Seen, 0-5, 5-55, 5-65 /hpf Final    Epithelial Cells Moderate (*) None Seen, Occasional /hpf Final    Bacteria, UA Occasional  None Seen, Occasional /hpf Final   MAGNESIUM - Normal    Magnesium 1 9  1 6 - 2 6 mg/dL Final   LIPASE - Normal    Lipase 74  73 - 393 u/L Final   PROTIME-INR - Normal    Protime 13 9  11 6 - 14 5 seconds Final    INR 1 07  0 84 - 1 19 Final   APTT - Normal    PTT 30  23 - 37 seconds Final    Comment: Therapeutic Heparin Range =  60-90 seconds   LACTIC ACID, PLASMA - Normal    LACTIC ACID 0 9  0 5 - 2 0 mmol/L Final    Narrative:     Result may be elevated if tourniquet was used during collection  POCT PREGNANCY, URINE - Normal    EXT PREG TEST UR (Ref: Negative) Negative   Final    Control Valid   Final       11:03 PM  CT without acute pathology  Will plan for DC home with follow-up with her PCP and return to ER instructions     Patient was recently placed on Adderall 2 weeks ago and states that she feels the symptoms started then  Long discussion with her mother in room regarding need for stool softener in the morning and night as well as MiraLax 2 to 3 times a week  He needs to stay on a maintained regimen  Discussed with him how multiple medications at once can counteract each other  They are agreeable plan of care  Patient resting in bed no distress  Patient is obese with bowel sounds x4 no acute distress  CT abdomen pelvis with contrast   Final Result      No acute inflammatory process identified within the abdomen or pelvis              Workstation performed: [id number] Procedures  MDM    Disposition  Final diagnoses:   Abdominal pain   Constipation     Time reflects when diagnosis was documented in both MDM as applicable and the Disposition within this note     Time User Action Codes Description Comment    2/5/2020 11:02 PM Tomas Brown Add [R10 9] Abdominal pain     2/5/2020 11:02 PM Paul Coles Add [K59 00] Constipation       ED Disposition     ED Disposition Condition Date/Time Comment    Discharge Stable Wed Feb 5, 2020 11:02 PM Colt Avalos discharge to home/self care  Follow-up Information     Follow up With Specialties Details Why [address] Janie Flood PA-C Family Medicine, Physician Assistant Schedule an appointment as soon as possible for a visit in 3 days  [address] [address]  [facility] [place] [id number]  [phone]          Patient's Medications   Discharge Prescriptions    No medications on file     No discharge procedures on file         ED Provider  Electronically Signed by     Pura Nassar DO  02/05/20 9340

## 2020-02-07 ENCOUNTER — OFFICE VISIT (OUTPATIENT)
Dept: FAMILY MEDICINE CLINIC | Facility: CLINIC | Age: 35
End: 2020-02-07
Payer: COMMERCIAL

## 2020-02-07 VITALS
BODY MASS INDEX: 44.41 KG/M2 | HEIGHT: 68 IN | TEMPERATURE: 98.6 F | WEIGHT: 293 LBS | DIASTOLIC BLOOD PRESSURE: 96 MMHG | HEART RATE: 93 BPM | SYSTOLIC BLOOD PRESSURE: 150 MMHG | OXYGEN SATURATION: 99 %

## 2020-02-07 DIAGNOSIS — I10 ESSENTIAL HYPERTENSION: Chronic | ICD-10-CM

## 2020-02-07 DIAGNOSIS — F50.81 BINGE-EATING DISORDER, SEVERE: ICD-10-CM

## 2020-02-07 DIAGNOSIS — G44.229 CHRONIC TENSION-TYPE HEADACHE, NOT INTRACTABLE: ICD-10-CM

## 2020-02-07 DIAGNOSIS — E66.01 MORBID OBESITY (HCC): ICD-10-CM

## 2020-02-07 DIAGNOSIS — K59.00 CONSTIPATION, UNSPECIFIED CONSTIPATION TYPE: Primary | ICD-10-CM

## 2020-02-07 DIAGNOSIS — F31.4 SEVERE BIPOLAR I DISORDER, MOST RECENT EPISODE DEPRESSED (HCC): ICD-10-CM

## 2020-02-07 DIAGNOSIS — F42.2 MIXED OBSESSIONAL THOUGHTS AND ACTS: ICD-10-CM

## 2020-02-07 PROCEDURE — 1036F TOBACCO NON-USER: CPT | Performed by: PHYSICIAN ASSISTANT

## 2020-02-07 PROCEDURE — 3008F BODY MASS INDEX DOCD: CPT | Performed by: PHYSICIAN ASSISTANT

## 2020-02-07 PROCEDURE — 3077F SYST BP >= 140 MM HG: CPT | Performed by: PHYSICIAN ASSISTANT

## 2020-02-07 PROCEDURE — 99213 OFFICE O/P EST LOW 20 MIN: CPT | Performed by: PHYSICIAN ASSISTANT

## 2020-02-07 PROCEDURE — 3080F DIAST BP >= 90 MM HG: CPT | Performed by: PHYSICIAN ASSISTANT

## 2020-02-07 NOTE — PROGRESS NOTES
Assessment/Plan:    Problem List Items Addressed This Visit        Cardiovascular and Mediastinum    Hypertension (Chronic)       Nervous and Auditory    Chronic tension-type headache, not intractable       Other    Obsessive-compulsive disorder    Binge-eating disorder, severe    Severe bipolar I disorder, most recent episode depressed ([facility] )    Morbid obesity ([facility] )      Other Visit Diagnoses     Constipation, unspecified constipation type    -  Primary           Diagnoses and all orders for this visit:    Constipation, unspecified constipation type    Morbid obesity ([facility] )    Binge-eating disorder, severe    Mixed obsessional thoughts and acts    Severe bipolar I disorder, most recent episode depressed ([facility])    Chronic tension-type headache, not intractable    Essential hypertension        Suggested that she try a high fiber diet and increasing fluids  She will continue to follow with her psychiatrist    Encouraged her to still follow through with her weight management appointment to help get her on track with living a healthier lifestyle  Blood pressure was noted to be high today  I have asked her to check this and let us know if she is getting consistently elevated readings  She will go for sleep study as scheduled and follow with neurology for her headaches  She will let us know if she has any problems or concerns  Subjective:      Patient ID: John Padilla is a 27 y o  female  Trenton Rose is a 27year old female who is here today for a follow-up from the ER for abdominal pain  The pain has resolved  She felt that after her psychiatrist started her on Adderall that she did not have a bowel movement in a few days  She states that since her visit to the ER her bowel movements have been normal  She feels as though the Adderall is helping her concentration  She has been trying to watch her diet   She is still very anxious about going to see weight management because she feels like she is going to get obsessed with her weight  She is also getting daily headaches  She has a follow-up scheduled with her neurologist as well as a sleep study scheduled  The following portions of the patient's history were reviewed and updated as appropriate:   She has a past medical history of Anorexia nervosa in remission, Anxiety, Back pain, [facility] disorder ([facility] Depression, Esophageal reflux (8/15/2013), Hypertension, Hypothyroid, Idiopathic intracranial hypertension, Lumbar degenerative disc disease (5/8/2014), Migraine, Obesity, Obsessive-compulsive disorder, Overactive bladder, Psychiatric disorder, Restless leg syndrome, controlled, Seasonal allergies, and Suicide and self-inflicted injury ([facility] )  ,  does not have any pertinent problems on file  ,   has a past surgical history that includes Tonsillectomy; Sinus endoscopy; Dental surgery; and IR lumbar puncture (2/26/2019)  ,  family history includes Arthritis in her family; Breast cancer in her family and maternal grandmother; Cancer in her maternal grandfather, maternal grandmother, and paternal grandmother; Depression in her mother; Diabetes in her mother; Heart disease in her maternal grandmother; Hypertension in her brother, family, father, and mother; Hypothyroidism in her father; Mental illness in her father; Osteopenia in her family; Osteoporosis in her family; Other in her mother; Pneumonia in her maternal grandfather and paternal grandfather; Skin cancer in her maternal grandmother; Stroke in her maternal grandmother; Suicide Attempts in her mother; Thyroid disease in her father; Transient ischemic attack in her family  ,   reports that she has never smoked  She has never used smokeless tobacco  She reports that she drank alcohol  She reports that she does not use drugs  ,  is allergic to doxycycline and other     Current Outpatient Medications   Medication Sig Dispense Refill    amphetamine-dextroamphetamine (ADDERALL) 10 mg tablet Take 10 mg by mouth 2 (two) times a day      buPROPion (WELLBUTRIN XL) 300 mg 24 hr tablet       cariprazine (VRAYLAR) 3 MG capsule Take by mouth daily       Cholecalciferol (VITAMIN D3) 5000 units CAPS Take 5,000 Units by mouth daily      clomiPRAMINE (ANAFRANIL) 50 mg capsule Take 100 mg by mouth 2 (two) times a day      gabapentin (NEURONTIN) 600 MG tablet TAKE 1/2 TABLET DAILY IN AM AND 1 AND 1/2 TABLET AT BEDTIME      HYDROcodone-acetaminophen (NORCO) 5-325 mg per tablet Take 1 tablet by mouth 2 (two) times a day as needed for pain (severe pain)      levothyroxine 125 mcg tablet Take 1 tablet (125 mcg total) by mouth daily in the early morning 90 tablet 0    loratadine (CLARITIN) 10 mg tablet Take 1 tablet (10 mg total) by mouth daily 90 tablet 3    LORazepam (ATIVAN) 1 mg tablet Take 1 mg by mouth 3 (three) times a day as needed for anxiety      medroxyPROGESTERone (PROVERA) 10 mg tablet Take 1 tablet (10 mg total) by mouth daily 20 tablet 0    meloxicam (MOBIC) 7 5 mg tablet TAKE 1 AFTER BREAKFAST AND DINNER DAILY AS NEEDED FOR PAIN AND SCIATICA  60 tablet 0    Multiple Vitamin (MULTIVITAMIN) capsule Take 1 capsule by mouth daily      omeprazole (PriLOSEC) 20 mg delayed release capsule Take 1 capsule (20 mg total) by mouth daily in the early morning 90 capsule 1    prochlorperazine (COMPAZINE) 10 mg tablet 1 tab at onset of migraine, can repeat in 8 hours, can take with triptan/NSAID 20 tablet 3    RESTASIS 0 05 % ophthalmic emulsion PLACE 1 DROP INTO BOTH EYES TWICE A DAY  3    rOPINIRole (REQUIP) 4 mg tablet TAKE 1 TABLET BY MOUTH TWICE A DAY 60 tablet 5    sertraline (ZOLOFT) 100 mg tablet Take 200 mg by mouth daily  0    topiramate (TOPAMAX) 100 mg tablet Take 2 tablets (200 mg total) by mouth daily 60 tablet 6     No current facility-administered medications for this visit  Review of Systems   Constitutional: Negative for chills, diaphoresis, fatigue and fever     HENT: Negative for congestion, ear pain, postnasal drip, rhinorrhea, sneezing, sore throat and trouble swallowing  Eyes: Negative for pain and visual disturbance  Respiratory: Negative for apnea, cough, shortness of breath and wheezing  Cardiovascular: Negative for chest pain and palpitations  Gastrointestinal: Positive for abdominal pain (resolved) and constipation (resolved)  Negative for diarrhea, nausea and vomiting  Genitourinary: Negative for dysuria  Musculoskeletal: Negative for arthralgias, gait problem and myalgias  Neurological: Positive for headaches  Negative for dizziness, syncope, weakness, light-headedness and numbness  Psychiatric/Behavioral: Positive for decreased concentration and dysphoric mood  Negative for suicidal ideas  The patient is nervous/anxious  Objective:  Vitals:    02/07/20 1245 02/07/20 1307   BP: (!) 154/106 150/96   Pulse: 93    Temp: 98 6 °F (37 °C)    SpO2: 99%    Weight: (!) 192 kg (423 lb 3 2 oz)    Height: 5' 7 5" (1 715 m)      Body mass index is 65 3 kg/m²  Physical Exam   Constitutional: She is oriented to person, place, and time  She appears well-developed and well-nourished  HENT:   Head: Normocephalic and atraumatic  Right Ear: External ear normal    Left Ear: External ear normal    Nose: Nose normal    Eyes: Pupils are equal, round, and reactive to light  EOM are normal    Neck: Normal range of motion  Neck supple  Cardiovascular: Normal rate, regular rhythm and normal heart sounds  Exam reveals no gallop and no friction rub  No murmur heard  Pulmonary/Chest: Effort normal and breath sounds normal  No respiratory distress  She has no wheezes  She has no rales  Abdominal: Soft  Bowel sounds are normal  There is no tenderness  There is no rebound and no guarding  Musculoskeletal: Normal range of motion  Lymphadenopathy:     She has no cervical adenopathy  Neurological: She is alert and oriented to person, place, and time  Skin: Skin is warm and dry     Psychiatric: Her behavior is normal  Thought content normal  Her affect is labile  She expresses impulsivity  She expresses no homicidal and no suicidal ideation  She expresses no suicidal plans and no homicidal plans  BMI Counseling: Body mass index is 65 3 kg/m²  The BMI is above normal  Nutrition recommendations include reducing portion sizes and 3-5 servings of fruits/vegetables daily  Exercise recommendations include exercising 3-5 times per week

## 2020-02-10 ENCOUNTER — PROCEDURE VISIT (OUTPATIENT)
Dept: GYNECOLOGY | Facility: CLINIC | Age: 35
End: 2020-02-10
Payer: COMMERCIAL

## 2020-02-10 VITALS
DIASTOLIC BLOOD PRESSURE: 84 MMHG | SYSTOLIC BLOOD PRESSURE: 123 MMHG | WEIGHT: 293 LBS | BODY MASS INDEX: 45.99 KG/M2 | HEART RATE: 94 BPM | HEIGHT: 67 IN

## 2020-02-10 DIAGNOSIS — Z30.430 ENCOUNTER FOR INSERTION OF MIRENA IUD: Primary | ICD-10-CM

## 2020-02-10 PROCEDURE — 58300 INSERT INTRAUTERINE DEVICE: CPT | Performed by: NURSE PRACTITIONER

## 2020-02-10 NOTE — PROGRESS NOTES
Iud insertions  Date/Time: 2/10/2020 10:50 AM  Performed by: LEIGHA Matt  Authorized by: LEIGHA Matt     Consent:     Consent obtained:  Verbal and written    Consent given by:  Patient    Procedure risks and benefits discussed: yes      Patient questions answered: yes      Patient agrees, verbalizes understanding, and wants to proceed: yes      Instructions and paperwork completed: yes    Procedure:     Pelvic exam performed: yes      Negative GC/chlamydia test: declined  Negative urine pregnancy test: declined, female partners only        Cervix cleaned and prepped: yes      Speculum placed in vagina: yes      Tenaculum applied to cervix: yes      Uterus sounded: yes      Uterus sound depth (cm):  7    IUD inserted with no complications: yes      IUD type:  Mirena    Strings trimmed: yes    Post-procedure:     Patient tolerated procedure well: yes      Patient will follow up after next period: yes    Comments:      LOT [id number], exp 4/2022

## 2020-02-13 ENCOUNTER — TELEPHONE (OUTPATIENT)
Dept: FAMILY MEDICINE CLINIC | Facility: CLINIC | Age: 35
End: 2020-02-13

## 2020-02-13 NOTE — TELEPHONE ENCOUNTER
Pt in to see you last Friday for elevated bp  Says she has been monitoring her bp at home her readings are high  Mostly 140/90

## 2020-02-13 NOTE — TELEPHONE ENCOUNTER
I would like her to schedule a follow-up appointment and bring a log with her of her blood pressure readings from home

## 2020-02-14 ENCOUNTER — TELEPHONE (OUTPATIENT)
Dept: BARIATRICS | Facility: CLINIC | Age: 35
End: 2020-02-14

## 2020-02-14 NOTE — ED CARE HANDOFF
Emergency Department Sign Out Note        Sign out and transfer of care from Dr Gale Tyson  See Separate Emergency Department note  The patient, Navi Urias, was evaluated by the previous provider for headache  ED Course as of Feb 26 2331 Tue Feb 26, 2019 1951 Patient signed out from Dr Gale Tyson- 27 yo F with headache, visual field deficits, h/o intracranial hypertension  LP attempted in the ED, IR will do the procedure in 1 hour  Patient will get the LP, then will be discharged- she has appointments already set up for followup with [facility]  2227 Patient reassesed s/p LP  No headache at this time  Will PO challenge, ambulate and if able to tolerate, discharge  Opening pressure is 17       2304 Patient tolerated PO and was able to ambulate through the emergency department without difficulty  Will discharge with followup as previously discussed  Procedures  MDM    Disposition  Final diagnoses:   Headache     Time reflects when diagnosis was documented in both MDM as applicable and the Disposition within this note     Time User Action Codes Description Comment    2/26/2019 11:05 PM Tye Freeman Add [R51] Headache       ED Disposition     ED Disposition Condition Date/Time Comment    Discharge Stable Tue Feb 26, 2019 11:05 PM Navi Urias discharge to home/self care              Follow-up Information     Follow up With Specialties Details Why Contact Info Additional [facility] Schedule an appointment as soon as possible for a visit in 1 week for re-evaluation [facility],[address] Neurology [facility], [address] [place] [address], [facility] Emergency Department Emergency Medicine Go to  As needed, If symptoms worsen, for re-evaluation [address] [facility] ED, [address], [place], [place], [address] Terry MD Family Medicine Schedule an appointment as soon as possible for a visit in 3 days for re-evaluation Jose Elias [address] [facility]  [phone]           Discharge Medication List as of 2/26/2019 11:06 PM      CONTINUE these medications which have NOT CHANGED    Details   buPROPion (WELLBUTRIN XL) 300 mg 24 hr tablet Starting Tue 7/31/2018, Historical Med      Cholecalciferol (VITAMIN D3) 5000 units CAPS Take 5,000 Units by mouth daily, Historical Med      clomiPRAMINE (ANAFRANIL) 50 mg capsule Take 100 mg by mouth 2 (two) times a day, Historical Med      fluticasone (FLONASE) 50 mcg/act nasal spray 1 spray daily, Starting Tue 11/20/2018, Historical Med      gabapentin (NEURONTIN) 600 MG tablet TAKE 2 TABLETS BY MOUTH AT BEDTIME, Normal      HYDROcodone-acetaminophen (NORCO) 5-325 mg per tablet Take 1 tablet by mouth 2 (two) times a day as needed for pain (severe pain), Historical Med      levothyroxine 100 mcg tablet TAKE 1 TABLET (100 MCG TOTAL) BY MOUTH DAILY IN THE EARLY MORNING FOR 30 DAYS, Starting Fri 2/15/2019, Until Sun 3/17/2019, Normal      LORazepam (ATIVAN) 1 mg tablet Take 1 mg by mouth 3 (three) times a day as needed for anxiety, Historical Med      lurasidone (LATUDA) 80 mg tablet Take 60 mg by mouth daily with breakfast  , Historical Med      meloxicam (MOBIC) 7 5 mg tablet TAKE 1 AFTER BREAKFAST AND DINNER DAILY AS NEEDED FOR PAIN AND SCIATICA , Normal      montelukast (SINGULAIR) 10 mg tablet TAKE 1 TABLET (10 MG TOTAL) BY MOUTH DAILY AT BEDTIME, Starting Fri 2/15/2019, Normal      Multiple Vitamin (MULTIVITAMIN) capsule Take 1 capsule by mouth daily, Historical Med      omeprazole (PriLOSEC) 20 mg delayed release capsule TAKE 1 CAPSULE (20 MG TOTAL) BY MOUTH DAILY IN THE EARLY MORNING FOR 30 DAYS, Starting Mon 10/1/2018, Until Wed 2/13/2019, Normal      RESTASIS 0 05 % ophthalmic emulsion PLACE 1 DROP INTO BOTH EYES TWICE A DAY, Historical Med      rOPINIRole (REQUIP) 4 mg tablet Take 1 tab  twice daily, Normal      sertraline (ZOLOFT) 100 mg tablet Take 200 mg by mouth daily, Starting Fri 7/20/2018, Historical Med           No discharge procedures on file         ED Provider  Electronically Signed by     Nancy Muñiz DO  02/26/19 5704 Patient   is  alert  and  oriented x3.  Color  is  good  and  skin warm  to touch.   She is c/o  cough, body aches, chest  discomfort ,  decreased  po  intake  and  weakness.  Patient  has  been afebrile.

## 2020-02-17 ENCOUNTER — TELEPHONE (OUTPATIENT)
Dept: NEUROLOGY | Facility: CLINIC | Age: 35
End: 2020-02-17

## 2020-02-17 DIAGNOSIS — G43.909 MIGRAINE WITHOUT STATUS MIGRAINOSUS, NOT INTRACTABLE, UNSPECIFIED MIGRAINE TYPE: Primary | ICD-10-CM

## 2020-02-17 NOTE — TELEPHONE ENCOUNTER
[address] [facility] CHOICES   PHYS ORD EVELYN UNTIL Feb 17, 2021 (HB)

## 2020-02-19 ENCOUNTER — OFFICE VISIT (OUTPATIENT)
Dept: NEUROLOGY | Facility: CLINIC | Age: 35
End: 2020-02-19
Payer: COMMERCIAL

## 2020-02-19 VITALS
WEIGHT: 293 LBS | HEIGHT: 67 IN | DIASTOLIC BLOOD PRESSURE: 80 MMHG | SYSTOLIC BLOOD PRESSURE: 128 MMHG | HEART RATE: 94 BPM | BODY MASS INDEX: 45.99 KG/M2

## 2020-02-19 DIAGNOSIS — G43.709 CHRONIC MIGRAINE WITHOUT AURA WITHOUT STATUS MIGRAINOSUS, NOT INTRACTABLE: Primary | ICD-10-CM

## 2020-02-19 DIAGNOSIS — G43.909 MIGRAINE WITHOUT STATUS MIGRAINOSUS, NOT INTRACTABLE, UNSPECIFIED MIGRAINE TYPE: ICD-10-CM

## 2020-02-19 DIAGNOSIS — G93.2 IDIOPATHIC INTRACRANIAL HYPERTENSION: ICD-10-CM

## 2020-02-19 PROCEDURE — 3079F DIAST BP 80-89 MM HG: CPT | Performed by: PHYSICIAN ASSISTANT

## 2020-02-19 PROCEDURE — 99214 OFFICE O/P EST MOD 30 MIN: CPT | Performed by: PHYSICIAN ASSISTANT

## 2020-02-19 PROCEDURE — 3074F SYST BP LT 130 MM HG: CPT | Performed by: PHYSICIAN ASSISTANT

## 2020-02-19 PROCEDURE — 1036F TOBACCO NON-USER: CPT | Performed by: PHYSICIAN ASSISTANT

## 2020-02-19 PROCEDURE — 3008F BODY MASS INDEX DOCD: CPT | Performed by: PHYSICIAN ASSISTANT

## 2020-02-19 NOTE — PROGRESS NOTES
Patient ID: Colt Avalos is a 27 y o  female  Assessment/Plan:    Chronic migraine without aura without status migrainosus, not intractable  · Continue topiramate  · She can take Tylenol for the mild headaches if needed  She will not take Tylenol more than 3 days per week  I have spent 25 minutes with Patient  today in which greater than 50% of this time was spent in counseling/coordination of care regarding Prognosis, Risks and benefits of tx options, Intructions for management, Patient and family education, Importance of tx compliance, Risk factor reductions and Impressions  She will follow-up in 6 months  Idiopathic intracranial hypertension  · Pt will follow-up with Ophthalmology  Problem List Items Addressed This Visit        Cardiovascular and Mediastinum    Chronic migraine without aura without status migrainosus, not intractable - Primary     · Continue topiramate  · She can take Tylenol for the mild headaches if needed  She will not take Tylenol more than 3 days per week  I have spent 25 minutes with Patient  today in which greater than 50% of this time was spent in counseling/coordination of care regarding Prognosis, Risks and benefits of tx options, Intructions for management, Patient and family education, Importance of tx compliance, Risk factor reductions and Impressions  She will follow-up in 6 months  Nervous and Auditory    Idiopathic intracranial hypertension     · Pt will follow-up with Ophthalmology  Other Visit Diagnoses     Migraine without status migrainosus, not intractable, unspecified migraine type                 Subjective:    HPI    We had the pleasure of evaluating Colt Avalos in neurological consultation today for headaches  As you know,  she is a 27 y o  right handed  female  Patient currently lives with her friend  She is here today for evaluation of her headaches  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    Is on disability at this time for her underlying mood disorders  At her last appt she was having an increased number of headaches  She titrated topiramate to 200mg at bedtime and tizanidine 2mg at bedtime  She stopped the tizanidine  She reports that she had a headache cycle towards the end of the summer  The migraine would last 5 days  It would improve daily but she would need to sleep for those 5 days  She was started on Adderall by her Psychiatrist and she has not had any further events  She will be having a sleep study March 1, 2020  She reports that over the past month she has had only mild headaches  She has had visual disturbances of red and blue lights  Anxiety:  Patient has longstanding history of anxiety and was diagnosed with bipolar disorder in the past   Patient states that is a missed diagnosis as she was recently given the diagnosis of obsessive-compulsive disorder  She overall feels that her underlying psychiatric problems are well controlled  She is on the right medication at this time  Did complete Transcranial Magnetic Stimulation which significantly helped her underlying mood disorders  - regularly sees a therapist and a psychiatrist       Weight:   Patient states that she has always been overweight  In 2006 she lost about 135 lb as she was trying to lose weight but ended up becoming anorexic  Following that she was hospitalized for the eating disorder and later ended up gaining about 200 lb  Has had difficulty losing weight since  She has seen weight management therapist and sees one now at this time  She seems well educated with the diagnosis of idiopathic intracranial hypertension and knows that weight loss is one of the treatments  Any family history of migraines? No  Any family history of aneurysms? No     Idiopathic intracranial hypertension:  She is given the diagnosis back when she was 11years of age with opening pressure of 42 mm of water  Was on Diamox at that time  Her recent lumbar puncture showed an opening pressure of 17  Not documented in the note this number is per patient  Ophthalmology-  - sees an ophthalmologist who did inform the patient that she has left optic nerve swelling only  Chronic daily headaches/chronic migraine headaches:  What is your current pain level - 2/10     Headaches started at what age? 8years old but when she was 14 they got much worse  When she was 15 she was given the diagnosis of IIH and LP done then and had OP of 42 mm of water  She states she was put on diamox and then symptoms resolved  She states back in fall of 2018 they started again and then started to have loss of vision on the left side when she moved quickly or when she bent over  She was seen by ophthalmology and told that she had optic nerve swelling on the left but not right  How often do the headaches occur? Less frequent  What time of the day do the headaches start? Headache can be present upon awakening or comes on during the day  How long do the headaches last? 5 day headache cycle     Are you ever headache free? Yes  Aura and how long does it last - none     Describe your usual headache - Pressure, throbbing     Where is your headache located? Frontal and orbital region mostly but can also be in the neck and occipital area  What is the intensity of pain? 7-9/10 on average     Associated symptoms:   · Decrease of appetite  · Photophobia, phonophobia, sensitivity to smell   · Blind spot in her vision  · Tinnitus  · light-headed or dizzy, stiff or sore neck,   · prefer to be alone and in a dark room, unable to work     Number of days missed per month because of headaches:  Work (or school) days: Not presently working    Social or Family activities: often     Headache are worse if the patient: cough, sneeze, bending over, may make the vision worse as well  Headache triggers:  Cold weather, Lack of sleep, skipping meals  What time of the year do headaches occur more frequently? None     Have you seen someone else for headaches or pain? Yes  Have you had trigger point injection performed and how often? No  Have you had Botox injection performed and how often? No   Have you had epidural injections or transforaminal injections performed? No     What medications do you take or have you taken for your headaches? PREVENTATIVE:  - magnesium, B2, vitamin-D  - Claritin  - lisinopril  - Wellbutrin, Zoloft, haloperidol, lithium, Latuda, Cogentin, Risperdal  - Topamax, gabapentin, Lamictal  ABORTIVE:  - Toradol, ibuprofen, diclofenac  - hydrocodone  - Ativan     Neck pain   When did the neck pain start? Has worsened over the past few months  Does your neck pain cause you to have headaches? Possibly  Sleep Habit  Is your sleep restful? no  What time do you go to bed at night? Varies  What time do you wake up in am? 5 to 5:30  How often do you get up at night? 2-3 times  Do you wake up with headaches? Can   Do you snore while asleep? no  Have you been told that you stop breathing during sleeping?    no  Do you wake up tired in the morning? yes  Do you take frequent naps during the day? sometimes  Do you have jaw pain? no  Do you grind/clench your teeth at night? yes  Do you have restless leg syndrome? yes  Do you have nightmare or sleep walk? Not usually     Alternative therapies used in the past for headaches? no other headache interventions have been tried  Have you used CBD or THC for your headaches and how often? No  Are you current pregnant or planning on getting pregnant? No  Have you ever had any Brain imaging?  Yes    The following portions of the patient's history were reviewed and updated as appropriate: allergies, current medications, past family history, past medical history, past social history, past surgical history and problem list          Objective:    Blood pressure 128/80, pulse 94, height 5' 7" (1 702 m), weight (!) 190 kg (419 lb), last menstrual period 01/29/2020  Physical Exam   Eyes: Pupils are equal, round, and reactive to light  Lids are normal    Neurological: She has normal strength and normal reflexes  Coordination normal    Psychiatric: Her speech is normal    Vitals reviewed  Neurological Exam  Mental Status   Oriented to person, place, time and situation  Recent and remote memory are intact  Speech is normal  Language is fluent with no aphasia  Attention and concentration are normal  Fund of knowledge is appropriate for level of education  Apraxia absent  Cranial Nerves  CN II: Visual acuity is normal  Visual fields full to confrontation  CN III, IV, VI: Extraocular movements intact bilaterally  Normal lids and orbits bilaterally  Pupils equal round and reactive to light bilaterally  CN V: Facial sensation is normal   CN VII: Full and symmetric facial movement  CN VIII: Hearing is normal   CN IX, X: Palate elevates symmetrically  Normal gag reflex  CN XI: Shoulder shrug strength is normal   CN XII: Tongue midline without atrophy or fasciculations  Motor   Strength is 5/5 throughout all four extremities  Sensory  Sensation is intact to light touch, pinprick, vibration and proprioception in all four extremities  Reflexes  Deep tendon reflexes are 2+ and symmetric in all four extremities with downgoing toes bilaterally  Coordination  Finger-to-nose, rapid alternating movements and heel-to-shin normal bilaterally without dysmetria  Gait  Normal casual, toe, heel and tandem gait  ROS:    Review of Systems   Constitutional: Negative  Negative for appetite change and fever  HENT: Negative  Negative for hearing loss, tinnitus, trouble swallowing and voice change  Eyes: Positive for visual disturbance (with the headache flashing light and blind spots)  Negative for photophobia and pain  Respiratory: Positive for apnea (sleep study march 1)  Negative for shortness of breath  Cardiovascular: Negative  Negative for palpitations  Gastrointestinal: Negative  Negative for nausea and vomiting  Endocrine: Negative  Negative for cold intolerance and heat intolerance  Genitourinary: Negative  Negative for dysuria, frequency and urgency  Musculoskeletal: Negative  Negative for myalgias and neck pain  Skin: Negative  Negative for rash  Allergic/Immunologic: Negative  Neurological: Negative  Negative for dizziness, tremors, seizures, syncope, facial asymmetry, speech difficulty, weakness, light-headedness, numbness and headaches  Gets headache and then falls asleep for 2 -3 days   Hematological: Negative  Does not bruise/bleed easily  Psychiatric/Behavioral: Positive for sleep disturbance (sometimes)  Negative for confusion and hallucinations  The patient is nervous/anxious  Review of systems personally reviewed

## 2020-02-19 NOTE — ASSESSMENT & PLAN NOTE
· Continue topiramate  · She can take Tylenol for the mild headaches if needed  She will not take Tylenol more than 3 days per week  I have spent 25 minutes with Patient  today in which greater than 50% of this time was spent in counseling/coordination of care regarding Prognosis, Risks and benefits of tx options, Intructions for management, Patient and family education, Importance of tx compliance, Risk factor reductions and Impressions  She will follow-up in 6 months

## 2020-02-19 NOTE — PATIENT INSTRUCTIONS
Chronic migraine without aura without status migrainosus, not intractable  · Continue topiramate  · She can take Tylenol for the mild headaches if needed  She will not take Tylenol more than 3 days per week  I have spent 25 minutes with Patient  today in which greater than 50% of this time was spent in counseling/coordination of care regarding Prognosis, Risks and benefits of tx options, Intructions for management, Patient and family education, Importance of tx compliance, Risk factor reductions and Impressions  She will follow-up in 6 months  Idiopathic intracranial hypertension  · Pt will follow-up with Ophthalmology

## 2020-02-25 ENCOUNTER — OFFICE VISIT (OUTPATIENT)
Dept: FAMILY MEDICINE CLINIC | Facility: CLINIC | Age: 35
End: 2020-02-25
Payer: COMMERCIAL

## 2020-02-25 VITALS
HEART RATE: 83 BPM | WEIGHT: 293 LBS | SYSTOLIC BLOOD PRESSURE: 146 MMHG | OXYGEN SATURATION: 99 % | DIASTOLIC BLOOD PRESSURE: 88 MMHG | TEMPERATURE: 97.3 F | BODY MASS INDEX: 45.99 KG/M2 | HEIGHT: 67 IN

## 2020-02-25 DIAGNOSIS — R03.0 ELEVATED BLOOD PRESSURE READING: Primary | ICD-10-CM

## 2020-02-25 DIAGNOSIS — R09.81 CHRONIC NASAL CONGESTION: ICD-10-CM

## 2020-02-25 PROCEDURE — 3008F BODY MASS INDEX DOCD: CPT | Performed by: PHYSICIAN ASSISTANT

## 2020-02-25 PROCEDURE — 1036F TOBACCO NON-USER: CPT | Performed by: PHYSICIAN ASSISTANT

## 2020-02-25 PROCEDURE — 3077F SYST BP >= 140 MM HG: CPT | Performed by: PHYSICIAN ASSISTANT

## 2020-02-25 PROCEDURE — 3079F DIAST BP 80-89 MM HG: CPT | Performed by: PHYSICIAN ASSISTANT

## 2020-02-25 PROCEDURE — 99213 OFFICE O/P EST LOW 20 MIN: CPT | Performed by: PHYSICIAN ASSISTANT

## 2020-02-25 NOTE — PROGRESS NOTES
Assessment/Plan:    Problem List Items Addressed This Visit     None      Visit Diagnoses     Elevated blood pressure reading    -  Primary    Chronic nasal congestion               Diagnoses and all orders for this visit:    Elevated blood pressure reading    Chronic nasal congestion        Blood pressure was slightly elevated today  At her last two office visits within the past month they were good  Home readings have been in the 120's/80s per patient  I asked her to continue to monitor at home  She is going to try a nasal saline spray at bedtime to see if this helps with her morning congestion  She is scheduled for a sleep study  I emphasized the importance of keeping this appointment with her  Subjective:      Patient ID: Elsa Rivera is a 27 y o  female  [facility] is a 27year old female who is here today for a check up for her blood pressures  She has been checking her blood pressures at home, which have been in the 120s/80s per patient  She was recently started on Adderall, and was slightly elevated at her last appointment  She is also complaining of chronic sinus drainage and pressure in the mornings  She was seen at ENT who wanted her to use a sinus rinse, but she refused because she can't tolerate them  She has tried nasal steroids, but they dry out her sinuses  She has been able to tolerate nasal saline sprays in the past  She is also concerned about going for her sleep study that is scheduled because she wakes up frequently in the middle of the night to use the bathroom  She is still following with her psychiatrist and counselor  She feels better since starting the Adderall  She denies any other concerns or problems at this time         The following portions of the patient's history were reviewed and updated as appropriate:   She has a past medical history of Anorexia nervosa in remission, Anxiety, Back pain, Bipolar disorder ([facility] [address] ), Depression, Esophageal reflux (8/15/2013), Hypertension, Hypothyroid, Idiopathic intracranial hypertension, Lumbar degenerative disc disease (5/8/2014), Migraine, Obesity, Obsessive-compulsive disorder, Overactive bladder, Psychiatric disorder, Restless leg syndrome, controlled, Seasonal allergies, and Suicide and self-inflicted injury ([facility] [address] )  ,  does not have any pertinent problems on file  ,   has a past surgical history that includes Tonsillectomy; Sinus endoscopy; Dental surgery; and IR lumbar puncture (2/26/2019)  ,  family history includes Arthritis in her family; Breast cancer in her family and maternal grandmother; Cancer in her maternal grandfather, maternal grandmother, and paternal grandmother; Depression in her mother; Diabetes in her mother; Heart disease in her maternal grandmother; Hypertension in her brother, family, father, and mother; Hypothyroidism in her father; Mental illness in her father; Osteopenia in her family; Osteoporosis in her family; Other in her mother; Pneumonia in her maternal grandfather and paternal grandfather; Skin cancer in her maternal grandmother; Stroke in her maternal grandmother; Suicide Attempts in her mother; Thyroid disease in her father; Transient ischemic attack in her family  ,   reports that she has never smoked  She has never used smokeless tobacco  She reports that she drank alcohol  She reports that she does not use drugs  ,  is allergic to doxycycline and other     Current Outpatient Medications   Medication Sig Dispense Refill    amphetamine-dextroamphetamine (ADDERALL) 10 mg tablet Take 10 mg by mouth 2 (two) times a day      buPROPion (WELLBUTRIN XL) 300 mg 24 hr tablet       cariprazine (VRAYLAR) 3 MG capsule Take by mouth daily       Cholecalciferol (VITAMIN D3) 5000 units CAPS Take 5,000 Units by mouth daily      clomiPRAMINE (ANAFRANIL) 50 mg capsule Take 100 mg by mouth 2 (two) times a day      gabapentin (NEURONTIN) 600 MG tablet TAKE 1/2 TABLET DAILY IN AM AND 1 AND 1/2 TABLET AT BEDTIME      HYDROcodone-acetaminophen (NORCO) 5-325 mg per tablet Take 1 tablet by mouth 2 (two) times a day as needed for pain (severe pain)      levothyroxine 125 mcg tablet Take 1 tablet (125 mcg total) by mouth daily in the early morning 90 tablet 0    loratadine (CLARITIN) 10 mg tablet Take 1 tablet (10 mg total) by mouth daily 90 tablet 3    LORazepam (ATIVAN) 1 mg tablet Take 1 mg by mouth 3 (three) times a day as needed for anxiety      meloxicam (MOBIC) 7 5 mg tablet TAKE 1 AFTER BREAKFAST AND DINNER DAILY AS NEEDED FOR PAIN AND SCIATICA  60 tablet 0    Multiple Vitamin (MULTIVITAMIN) capsule Take 1 capsule by mouth daily      omeprazole (PriLOSEC) 20 mg delayed release capsule Take 1 capsule (20 mg total) by mouth daily in the early morning 90 capsule 1    prochlorperazine (COMPAZINE) 10 mg tablet 1 tab at onset of migraine, can repeat in 8 hours, can take with triptan/NSAID 20 tablet 3    RESTASIS 0 05 % ophthalmic emulsion PLACE 1 DROP INTO BOTH EYES TWICE A DAY  3    rOPINIRole (REQUIP) 4 mg tablet TAKE 1 TABLET BY MOUTH TWICE A DAY 60 tablet 5    sertraline (ZOLOFT) 100 mg tablet Take 200 mg by mouth daily  0    topiramate (TOPAMAX) 100 mg tablet Take 2 tablets (200 mg total) by mouth daily 60 tablet 6    medroxyPROGESTERone (PROVERA) 10 mg tablet Take 1 tablet (10 mg total) by mouth daily (Patient not taking: Reported on 2/19/2020) 20 tablet 0     No current facility-administered medications for this visit  Review of Systems   Constitutional: Positive for fatigue  Negative for chills, diaphoresis and fever  HENT: Negative for congestion, ear pain, postnasal drip, rhinorrhea, sneezing, sore throat and trouble swallowing  +Dry mouth  +Sinus congestion   Eyes: Negative for pain and visual disturbance  Respiratory: Negative for apnea, cough, shortness of breath and wheezing  Cardiovascular: Negative for chest pain and palpitations     Gastrointestinal: Negative for abdominal pain, constipation, diarrhea, nausea and vomiting  Genitourinary: Negative for dysuria and hematuria  Musculoskeletal: Negative for arthralgias, gait problem and myalgias  Neurological: Negative for dizziness, syncope, weakness, light-headedness, numbness and headaches  Psychiatric/Behavioral: Positive for decreased concentration, dysphoric mood and sleep disturbance  Negative for suicidal ideas  The patient is nervous/anxious  Objective:  Vitals:    02/25/20 1436   BP: 146/88   Pulse: 83   Temp: (!) 97 3 °F (36 3 °C)   SpO2: 99%   Weight: (!) 193 kg (425 lb)   Height: 5' 7" (1 702 m)     Body mass index is 66 56 kg/m²  Physical Exam   Constitutional: She is oriented to person, place, and time  She appears well-developed and well-nourished  HENT:   Head: Normocephalic and atraumatic  Right Ear: Tympanic membrane, external ear and ear canal normal    Left Ear: Tympanic membrane, external ear and ear canal normal    Nose: Nose normal    Mouth/Throat: Oropharynx is clear and moist and mucous membranes are normal  No oropharyngeal exudate, posterior oropharyngeal edema or posterior oropharyngeal erythema  Eyes: Pupils are equal, round, and reactive to light  EOM are normal    Neck: Normal range of motion  Neck supple  Cardiovascular: Normal rate, regular rhythm and normal heart sounds  Exam reveals no gallop and no friction rub  No murmur heard  Pulmonary/Chest: Effort normal and breath sounds normal  No respiratory distress  She has no wheezes  She has no rales  Abdominal: Soft  Bowel sounds are normal  There is no tenderness  There is no rebound and no guarding  Musculoskeletal: Normal range of motion  Lymphadenopathy:     She has no cervical adenopathy  Neurological: She is alert and oriented to person, place, and time  Skin: Skin is warm and dry  Psychiatric: Her behavior is normal  Judgment and thought content normal  Her mood appears anxious  Her affect is labile   Her speech is tangential    Vitals reviewed

## 2020-02-28 ENCOUNTER — TELEPHONE (OUTPATIENT)
Dept: GYNECOLOGY | Facility: CLINIC | Age: 35
End: 2020-02-28

## 2020-03-01 ENCOUNTER — HOSPITAL ENCOUNTER (OUTPATIENT)
Dept: SLEEP CENTER | Facility: HOSPITAL | Age: 35
Discharge: HOME | End: 2020-03-01
Payer: COMMERCIAL

## 2020-03-01 DIAGNOSIS — G47.19 EXCESSIVE DAYTIME SLEEPINESS: ICD-10-CM

## 2020-03-01 PROCEDURE — 95810 POLYSOM 6/> YRS 4/> PARAM: CPT

## 2020-03-02 DIAGNOSIS — G47.33 OBSTRUCTIVE SLEEP APNEA SYNDROME: Primary | ICD-10-CM

## 2020-03-02 NOTE — PROGRESS NOTES
Sleep Study Documentation    Pre-Sleep Study       Sleep testing procedure explained to patient:YES    Patient napped prior to study:YES- more than 30 minutes  Napped after 2PM: yes    Caffeine:Dayshift worker after 12PM   Caffeine use:NO    Alcohol:Dayshift workers after 5PM: Alcohol use:NO    Typical day for patient:YES       Study Documentation    Sleep Study Indications: snoring, EDS, BMI>30,     Sleep Study: Diagnostic   Snore:Severe  Supplemental O2: no    O2 flow rate (L/min) range n/a  O2 flow rate (L/min) final n/a  Minimum SaO2 96  Baseline SaO2 89        Mode of Therapy:    EKG abnormalities: no     EEG abnormalities: no    Sleep Study Recorded < 2 hours: N/A    Sleep Study Recorded > 2 hours but incomplete study: N/A    Sleep Study Recorded 6 hours but no sleep obtained: NO    Patient classification: unemployed       Post-Sleep Study    Medication used at bedtime or during sleep study:YES other prescription medications    Patient reports time it took to fall asleep:less than 20 minutes    Patient reports waking up during study:1 to 2 times  Patient reports returning to sleep without difficulty  Patient reports sleeping 6 to 8 hours without dreaming  Patient reports sleep during study:better than usual    Patient rated sleepiness: Not sleepy or tired    PAP treatment:no

## 2020-03-05 ENCOUNTER — TELEPHONE (OUTPATIENT)
Dept: SLEEP CENTER | Facility: CLINIC | Age: 35
End: 2020-03-05

## 2020-03-05 NOTE — TELEPHONE ENCOUNTER
Left message for the patient to call back to discuss sleep study results  Mild CAIO     Patient needs to schedule consult with Dr Kendell Hopkins

## 2020-03-06 ENCOUNTER — ANNUAL EXAM (OUTPATIENT)
Dept: GYNECOLOGY | Facility: CLINIC | Age: 35
End: 2020-03-06
Payer: COMMERCIAL

## 2020-03-06 VITALS
WEIGHT: 293 LBS | HEIGHT: 67 IN | SYSTOLIC BLOOD PRESSURE: 128 MMHG | DIASTOLIC BLOOD PRESSURE: 82 MMHG | BODY MASS INDEX: 45.99 KG/M2 | HEART RATE: 98 BPM

## 2020-03-06 DIAGNOSIS — Z30.431 IUD CHECK UP: ICD-10-CM

## 2020-03-06 DIAGNOSIS — Z01.419 ENCNTR FOR GYN EXAM (GENERAL) (ROUTINE) W/O ABN FINDINGS: Primary | ICD-10-CM

## 2020-03-06 PROCEDURE — G0101 CA SCREEN;PELVIC/BREAST EXAM: HCPCS | Performed by: NURSE PRACTITIONER

## 2020-03-06 NOTE — PROGRESS NOTES
Assessment/Plan:     Pap every 5 years if normal-due 2023, sexually transmitted infection testing as indicated-declined, exercise most days of week, obtain appropriate diet and hydration, Calcium 1000mg + 600 vit D daily, Check IUD string monthly after menses  HPV 9 vaccine recommended through age 39  Check with your insurance for coverage  If covered, call office to schedule start of vaccine series  Annual mammogram starting at age 36, monthly breast self exam    Complete ordered thyroid level  Negative depression screen  Diagnoses and all orders for this visit:    Encntr for gyn exam (general) (routine) w/o abn findings    IUD check up    BMI 60 0-69 9, adult ([facility] [address] )              Subjective:      Patient ID: Colt Avalos is a 27 y o  female  Colt Avalos is a 27 y o  female who is here today for her annual visit  No health concerns  Irregular bleeding post insertion of her Mirena IUD done 2/10/19  Had one menses post insertion lasting approx 5 days with mod flow  Menses is acceptable  Elevated TSH and low FT4 around 1/9/2020  Followed by PCP and taking levothyroxine  Due to have her level checked now  Colt Avalos is sexually active currently with female partner x 1 month  Normal pap with negative HR HPV 2/26/18  No STI concerns  Recently diagnosed with mild sleep apnea  The following portions of the patient's history were reviewed and updated as appropriate: allergies, current medications, past family history, past medical history, past social history, past surgical history and problem list     Review of Systems   Constitutional: Negative  Negative for activity change, appetite change, chills, diaphoresis, fatigue, fever and unexpected weight change  HENT: Negative for congestion, dental problem, sneezing, sore throat and trouble swallowing  Eyes: Negative for visual disturbance  Respiratory: Negative for chest tightness and shortness of breath      Cardiovascular: Negative for chest pain and leg swelling  Gastrointestinal: Negative for abdominal pain, constipation, diarrhea, nausea and vomiting  Genitourinary: Negative for difficulty urinating, dyspareunia, dysuria, frequency, hematuria, menstrual problem, pelvic pain, urgency, vaginal bleeding, vaginal discharge and vaginal pain  Musculoskeletal: Negative for back pain and neck pain  Skin: Negative  Allergic/Immunologic: Negative  Neurological: Negative for weakness and headaches  Hematological: Negative for adenopathy  Psychiatric/Behavioral: Negative  Objective:      /82 (BP Location: Left arm, Patient Position: Sitting, Cuff Size: Standard)   Pulse 98   Ht 5' 7" (1 702 m)   Wt (!) 193 kg (424 lb 9 6 oz)   LMP 02/28/2020   BMI 66 50 kg/m²          Physical Exam   Constitutional: She is oriented to person, place, and time  Vital signs are normal  She appears well-developed and well-nourished  Physical exam limited by habitus   HENT:   Head: Normocephalic and atraumatic  Eyes: Right eye exhibits no discharge  Left eye exhibits no discharge  Neck: Trachea normal and normal range of motion  Neck supple  No thyromegaly present  Cardiovascular: Normal rate, regular rhythm, normal heart sounds and intact distal pulses  Pulmonary/Chest: Effort normal and breath sounds normal  Right breast exhibits no inverted nipple, no mass, no nipple discharge, no skin change and no tenderness  Left breast exhibits no inverted nipple, no mass, no nipple discharge, no skin change and no tenderness  No breast tenderness, discharge or bleeding  Breasts are symmetrical    Abdominal: Soft  Normal appearance  Genitourinary: Vagina normal and uterus normal  Rectal exam shows no external hemorrhoid  No breast tenderness, discharge or bleeding  Pelvic exam was performed with patient supine  No labial fusion  There is no rash, tenderness, lesion or injury on the right labia   There is no rash, tenderness, lesion or injury on the left labia  Cervix exhibits no motion tenderness, no discharge and no friability  Right adnexum displays no mass, no tenderness and no fullness  Left adnexum displays no mass, no tenderness and no fullness  No erythema, tenderness or bleeding in the vagina  No foreign body in the vagina  No signs of injury around the vagina  No vaginal discharge found  Genitourinary Comments: iud string noted at os   Musculoskeletal: Normal range of motion  Lymphadenopathy:        Head (right side): No submental, no submandibular and no tonsillar adenopathy present  Head (left side): No submental, no submandibular and no tonsillar adenopathy present  She has no cervical adenopathy  She has no axillary adenopathy  No inguinal adenopathy noted on the right or left side  Right: No inguinal adenopathy present  Left: No inguinal adenopathy present  Neurological: She is alert and oriented to person, place, and time  Skin: Skin is warm and dry  Psychiatric: She has a normal mood and affect  Nursing note and vitals reviewed

## 2020-03-08 DIAGNOSIS — L70.0 ACNE VULGARIS: Primary | ICD-10-CM

## 2020-03-08 RX ORDER — CLINDAMYCIN PHOSPHATE 10 MG/G
AEROSOL, FOAM TOPICAL
Qty: 50 G | Refills: 1 | Status: SHIPPED | OUTPATIENT
Start: 2020-03-08 | End: 2020-04-18

## 2020-03-11 ENCOUNTER — APPOINTMENT (OUTPATIENT)
Dept: LAB | Facility: MEDICAL CENTER | Age: 35
End: 2020-03-11
Payer: COMMERCIAL

## 2020-03-11 DIAGNOSIS — D72.829 LEUKOCYTOSIS, UNSPECIFIED TYPE: ICD-10-CM

## 2020-03-11 DIAGNOSIS — R73.01 IMPAIRED FASTING GLUCOSE: ICD-10-CM

## 2020-03-11 DIAGNOSIS — E03.9 HYPOTHYROIDISM, UNSPECIFIED TYPE: ICD-10-CM

## 2020-03-11 LAB
BASOPHILS # BLD AUTO: 0.07 THOUSANDS/ΜL (ref 0–0.1)
BASOPHILS NFR BLD AUTO: 1 % (ref 0–1)
EOSINOPHIL # BLD AUTO: 0.34 THOUSAND/ΜL (ref 0–0.61)
EOSINOPHIL NFR BLD AUTO: 4 % (ref 0–6)
ERYTHROCYTE [DISTWIDTH] IN BLOOD BY AUTOMATED COUNT: 14.6 % (ref 11.6–15.1)
EST. AVERAGE GLUCOSE BLD GHB EST-MCNC: 111 MG/DL
HBA1C MFR BLD: 5.5 %
HCT VFR BLD AUTO: 41.4 % (ref 34.8–46.1)
HGB BLD-MCNC: 12.4 G/DL (ref 11.5–15.4)
IMM GRANULOCYTES # BLD AUTO: 0.06 THOUSAND/UL (ref 0–0.2)
IMM GRANULOCYTES NFR BLD AUTO: 1 % (ref 0–2)
INSULIN SERPL-ACNC: 28.8 MU/L (ref 3–25)
LYMPHOCYTES # BLD AUTO: 3.12 THOUSANDS/ΜL (ref 0.6–4.47)
LYMPHOCYTES NFR BLD AUTO: 32 % (ref 14–44)
MCH RBC QN AUTO: 27.2 PG (ref 26.8–34.3)
MCHC RBC AUTO-ENTMCNC: 30 G/DL (ref 31.4–37.4)
MCV RBC AUTO: 91 FL (ref 82–98)
MONOCYTES # BLD AUTO: 0.64 THOUSAND/ΜL (ref 0.17–1.22)
MONOCYTES NFR BLD AUTO: 7 % (ref 4–12)
NEUTROPHILS # BLD AUTO: 5.52 THOUSANDS/ΜL (ref 1.85–7.62)
NEUTS SEG NFR BLD AUTO: 55 % (ref 43–75)
NRBC BLD AUTO-RTO: 0 /100 WBCS
PLATELET # BLD AUTO: 322 THOUSANDS/UL (ref 149–390)
PMV BLD AUTO: 9.5 FL (ref 8.9–12.7)
RBC # BLD AUTO: 4.56 MILLION/UL (ref 3.81–5.12)
TSH SERPL DL<=0.05 MIU/L-ACNC: 1.46 UIU/ML (ref 0.36–3.74)
WBC # BLD AUTO: 9.75 THOUSAND/UL (ref 4.31–10.16)

## 2020-03-11 PROCEDURE — 85025 COMPLETE CBC W/AUTO DIFF WBC: CPT

## 2020-03-11 PROCEDURE — 83525 ASSAY OF INSULIN: CPT

## 2020-03-11 PROCEDURE — 36415 COLL VENOUS BLD VENIPUNCTURE: CPT

## 2020-03-11 PROCEDURE — 83036 HEMOGLOBIN GLYCOSYLATED A1C: CPT

## 2020-03-11 PROCEDURE — 84443 ASSAY THYROID STIM HORMONE: CPT

## 2020-03-26 DIAGNOSIS — E03.9 HYPOTHYROIDISM, UNSPECIFIED TYPE: ICD-10-CM

## 2020-03-26 RX ORDER — LEVOTHYROXINE SODIUM 0.12 MG/1
TABLET ORAL
Qty: 90 TABLET | Refills: 0 | Status: SHIPPED | OUTPATIENT
Start: 2020-03-26 | End: 2020-06-19

## 2020-04-17 ENCOUNTER — TELEPHONE (OUTPATIENT)
Dept: FAMILY MEDICINE CLINIC | Facility: CLINIC | Age: 35
End: 2020-04-17

## 2020-04-17 DIAGNOSIS — K21.9 GASTROESOPHAGEAL REFLUX DISEASE WITHOUT ESOPHAGITIS: ICD-10-CM

## 2020-04-17 DIAGNOSIS — L70.0 ACNE VULGARIS: ICD-10-CM

## 2020-04-17 RX ORDER — OMEPRAZOLE 20 MG/1
20 CAPSULE, DELAYED RELEASE ORAL 2 TIMES DAILY
Qty: 90 CAPSULE | Refills: 1 | Status: CANCELLED
Start: 2020-04-17

## 2020-04-18 RX ORDER — CLINDAMYCIN PHOSPHATE 10 MG/G
AEROSOL, FOAM TOPICAL
Qty: 50 G | Refills: 1 | Status: SHIPPED | OUTPATIENT
Start: 2020-04-18 | End: 2020-06-19

## 2020-04-21 RX ORDER — OMEPRAZOLE 20 MG/1
20 CAPSULE, DELAYED RELEASE ORAL 2 TIMES DAILY
Qty: 90 CAPSULE | Refills: 1 | Status: CANCELLED
Start: 2020-04-21

## 2020-04-29 RX ORDER — OMEPRAZOLE 20 MG/1
20 CAPSULE, DELAYED RELEASE ORAL 2 TIMES DAILY
Qty: 90 CAPSULE | Refills: 1 | Status: CANCELLED | OUTPATIENT
Start: 2020-04-29

## 2020-04-30 ENCOUNTER — TELEPHONE (OUTPATIENT)
Dept: FAMILY MEDICINE CLINIC | Facility: CLINIC | Age: 35
End: 2020-04-30

## 2020-04-30 RX ORDER — GABAPENTIN 600 MG/1
TABLET ORAL
Qty: 90 TABLET | Refills: 0 | OUTPATIENT
Start: 2020-04-30

## 2020-05-11 ENCOUNTER — TELEPHONE (OUTPATIENT)
Dept: NEUROLOGY | Facility: CLINIC | Age: 35
End: 2020-05-11

## 2020-05-18 ENCOUNTER — TELEPHONE (OUTPATIENT)
Dept: FAMILY MEDICINE CLINIC | Facility: CLINIC | Age: 35
End: 2020-05-18

## 2020-05-18 DIAGNOSIS — K21.9 GASTROESOPHAGEAL REFLUX DISEASE WITHOUT ESOPHAGITIS: ICD-10-CM

## 2020-05-18 RX ORDER — OMEPRAZOLE 20 MG/1
20 CAPSULE, DELAYED RELEASE ORAL 2 TIMES DAILY
Qty: 180 CAPSULE | Refills: 1 | Status: SHIPPED | OUTPATIENT
Start: 2020-05-18 | End: 2020-11-15

## 2020-05-21 ENCOUNTER — TELEPHONE (OUTPATIENT)
Dept: NEUROLOGY | Facility: CLINIC | Age: 35
End: 2020-05-21

## 2020-05-22 DIAGNOSIS — G43.909 MIGRAINE WITHOUT STATUS MIGRAINOSUS, NOT INTRACTABLE, UNSPECIFIED MIGRAINE TYPE: Primary | ICD-10-CM

## 2020-05-22 RX ORDER — OLANZAPINE 5 MG/1
TABLET ORAL
Qty: 5 TABLET | Refills: 0 | Status: SHIPPED | OUTPATIENT
Start: 2020-05-22 | End: 2020-06-19

## 2020-05-27 ENCOUNTER — APPOINTMENT (EMERGENCY)
Dept: CT IMAGING | Facility: HOSPITAL | Age: 35
End: 2020-05-27
Payer: COMMERCIAL

## 2020-05-27 ENCOUNTER — HOSPITAL ENCOUNTER (EMERGENCY)
Facility: HOSPITAL | Age: 35
Discharge: HOME | End: 2020-05-28
Attending: EMERGENCY MEDICINE
Payer: COMMERCIAL

## 2020-05-27 VITALS
DIASTOLIC BLOOD PRESSURE: 60 MMHG | HEART RATE: 81 BPM | WEIGHT: 293 LBS | RESPIRATION RATE: 20 BRPM | BODY MASS INDEX: 66.16 KG/M2 | OXYGEN SATURATION: 97 % | SYSTOLIC BLOOD PRESSURE: 126 MMHG | TEMPERATURE: 97.1 F

## 2020-05-27 DIAGNOSIS — G43.909 MIGRAINE: Primary | ICD-10-CM

## 2020-05-27 LAB
ANION GAP SERPL CALCULATED.3IONS-SCNC: 9 MMOL/L (ref 4–13)
ANISOCYTOSIS BLD QL SMEAR: PRESENT
APTT PPP: 32 SECONDS (ref 23–37)
BASOPHILS # BLD MANUAL: 0 THOUSAND/UL (ref 0–0.1)
BASOPHILS NFR MAR MANUAL: 0 % (ref 0–1)
BUN SERPL-MCNC: 19 MG/DL (ref 5–25)
CALCIUM SERPL-MCNC: 8.9 MG/DL (ref 8.3–10.1)
CHLORIDE SERPL-SCNC: 104 MMOL/L (ref 100–108)
CO2 SERPL-SCNC: 25 MMOL/L (ref 21–32)
CREAT SERPL-MCNC: 1.04 MG/DL (ref 0.6–1.3)
EOSINOPHIL # BLD MANUAL: 0.41 THOUSAND/UL (ref 0–0.4)
EOSINOPHIL NFR BLD MANUAL: 3 % (ref 0–6)
ERYTHROCYTE [DISTWIDTH] IN BLOOD BY AUTOMATED COUNT: 14.4 % (ref 11.6–15.1)
EXT PREG TEST URINE: NEGATIVE
EXT. CONTROL ED NAV: NORMAL
GFR SERPL CREATININE-BSD FRML MDRD: 70 ML/MIN/1.73SQ M
GLUCOSE SERPL-MCNC: 97 MG/DL (ref 65–140)
HCT VFR BLD AUTO: 47.1 % (ref 34.8–46.1)
HGB BLD-MCNC: 14.6 G/DL (ref 11.5–15.4)
INR PPP: 1.05 (ref 0.84–1.19)
LYMPHOCYTES # BLD AUTO: 38 % (ref 14–44)
LYMPHOCYTES # BLD AUTO: 5.13 THOUSAND/UL (ref 0.6–4.47)
MCH RBC QN AUTO: 27.6 PG (ref 26.8–34.3)
MCHC RBC AUTO-ENTMCNC: 31 G/DL (ref 31.4–37.4)
MCV RBC AUTO: 89 FL (ref 82–98)
MONOCYTES # BLD AUTO: 0.95 THOUSAND/UL (ref 0–1.22)
MONOCYTES NFR BLD: 7 % (ref 4–12)
NEUTROPHILS # BLD MANUAL: 7.02 THOUSAND/UL (ref 1.85–7.62)
NEUTS SEG NFR BLD AUTO: 52 % (ref 43–75)
NRBC BLD AUTO-RTO: 0 /100 WBCS
PLATELET # BLD AUTO: 365 THOUSANDS/UL (ref 149–390)
PLATELET BLD QL SMEAR: ADEQUATE
PMV BLD AUTO: 9.2 FL (ref 8.9–12.7)
POTASSIUM SERPL-SCNC: 4.8 MMOL/L (ref 3.5–5.3)
PROTHROMBIN TIME: 13.7 SECONDS (ref 11.6–14.5)
RBC # BLD AUTO: 5.29 MILLION/UL (ref 3.81–5.12)
SODIUM SERPL-SCNC: 138 MMOL/L (ref 136–145)
TOTAL CELLS COUNTED SPEC: 100
WBC # BLD AUTO: 13.5 THOUSAND/UL (ref 4.31–10.16)

## 2020-05-27 PROCEDURE — 96374 THER/PROPH/DIAG INJ IV PUSH: CPT

## 2020-05-27 PROCEDURE — 70498 CT ANGIOGRAPHY NECK: CPT

## 2020-05-27 PROCEDURE — 99284 EMERGENCY DEPT VISIT MOD MDM: CPT

## 2020-05-27 PROCEDURE — 85730 THROMBOPLASTIN TIME PARTIAL: CPT | Performed by: EMERGENCY MEDICINE

## 2020-05-27 PROCEDURE — 85027 COMPLETE CBC AUTOMATED: CPT | Performed by: EMERGENCY MEDICINE

## 2020-05-27 PROCEDURE — 85007 BL SMEAR W/DIFF WBC COUNT: CPT | Performed by: EMERGENCY MEDICINE

## 2020-05-27 PROCEDURE — 85610 PROTHROMBIN TIME: CPT | Performed by: EMERGENCY MEDICINE

## 2020-05-27 PROCEDURE — 93005 ELECTROCARDIOGRAM TRACING: CPT

## 2020-05-27 PROCEDURE — 81025 URINE PREGNANCY TEST: CPT | Performed by: EMERGENCY MEDICINE

## 2020-05-27 PROCEDURE — 99285 EMERGENCY DEPT VISIT HI MDM: CPT | Performed by: EMERGENCY MEDICINE

## 2020-05-27 PROCEDURE — 70496 CT ANGIOGRAPHY HEAD: CPT

## 2020-05-27 PROCEDURE — 80048 BASIC METABOLIC PNL TOTAL CA: CPT | Performed by: EMERGENCY MEDICINE

## 2020-05-27 RX ORDER — KETOROLAC TROMETHAMINE 30 MG/ML
30 INJECTION, SOLUTION INTRAMUSCULAR; INTRAVENOUS ONCE
Status: COMPLETED | OUTPATIENT
Start: 2020-05-27 | End: 2020-05-27

## 2020-05-27 RX ORDER — KETOROLAC TROMETHAMINE 10 MG/1
10 TABLET, FILM COATED ORAL EVERY 6 HOURS PRN
Qty: 20 TABLET | Refills: 0 | Status: SHIPPED | OUTPATIENT
Start: 2020-05-27 | End: 2021-01-06 | Stop reason: SDUPTHER

## 2020-05-27 RX ADMIN — IOHEXOL 100 ML: 350 INJECTION, SOLUTION INTRAVENOUS at 19:31

## 2020-05-27 RX ADMIN — KETOROLAC TROMETHAMINE 30 MG: 30 INJECTION, SOLUTION INTRAMUSCULAR at 22:30

## 2020-05-28 LAB
ATRIAL RATE: 84 BPM
P AXIS: 45 DEGREES
PR INTERVAL: 172 MS
QRS AXIS: 15 DEGREES
QRSD INTERVAL: 92 MS
QT INTERVAL: 370 MS
QTC INTERVAL: 437 MS
T WAVE AXIS: 44 DEGREES
VENTRICULAR RATE: 84 BPM

## 2020-05-28 PROCEDURE — 93010 ELECTROCARDIOGRAM REPORT: CPT | Performed by: INTERNAL MEDICINE

## 2020-06-01 DIAGNOSIS — G43.709 CHRONIC MIGRAINE WITHOUT AURA WITHOUT STATUS MIGRAINOSUS, NOT INTRACTABLE: Primary | ICD-10-CM

## 2020-06-01 RX ORDER — TOPIRAMATE 25 MG/1
TABLET ORAL
Qty: 120 TABLET | Refills: 0 | Status: SHIPPED | OUTPATIENT
Start: 2020-06-01 | End: 2020-07-16 | Stop reason: ALTCHOICE

## 2020-06-16 ENCOUNTER — HOSPITAL ENCOUNTER (EMERGENCY)
Facility: HOSPITAL | Age: 35
Discharge: HOME | End: 2020-06-16
Attending: EMERGENCY MEDICINE | Admitting: EMERGENCY MEDICINE
Payer: COMMERCIAL

## 2020-06-16 ENCOUNTER — APPOINTMENT (EMERGENCY)
Dept: RADIOLOGY | Facility: HOSPITAL | Age: 35
End: 2020-06-16
Payer: COMMERCIAL

## 2020-06-16 VITALS
WEIGHT: 293 LBS | TEMPERATURE: 98.2 F | RESPIRATION RATE: 18 BRPM | HEART RATE: 82 BPM | DIASTOLIC BLOOD PRESSURE: 78 MMHG | BODY MASS INDEX: 45.99 KG/M2 | SYSTOLIC BLOOD PRESSURE: 152 MMHG | HEIGHT: 67 IN | OXYGEN SATURATION: 99 %

## 2020-06-16 DIAGNOSIS — K59.00 CONSTIPATION: Primary | ICD-10-CM

## 2020-06-16 LAB
BACTERIA UR QL AUTO: ABNORMAL /HPF
BILIRUB UR QL STRIP: NEGATIVE
CLARITY UR: ABNORMAL
COLOR UR: YELLOW
EXT PREG TEST URINE: NEGATIVE
EXT. CONTROL ED NAV: NORMAL
GLUCOSE UR STRIP-MCNC: NEGATIVE MG/DL
HGB UR QL STRIP.AUTO: NEGATIVE
KETONES UR STRIP-MCNC: NEGATIVE MG/DL
LEUKOCYTE ESTERASE UR QL STRIP: ABNORMAL
NITRITE UR QL STRIP: NEGATIVE
NON-SQ EPI CELLS URNS QL MICRO: ABNORMAL /HPF
PH UR STRIP.AUTO: 6 [PH]
PROT UR STRIP-MCNC: NEGATIVE MG/DL
RBC #/AREA URNS AUTO: ABNORMAL /HPF
SP GR UR STRIP.AUTO: >=1.03 (ref 1–1.03)
UROBILINOGEN UR QL STRIP.AUTO: 0.2 E.U./DL
WBC #/AREA URNS AUTO: ABNORMAL /HPF

## 2020-06-16 PROCEDURE — 99283 EMERGENCY DEPT VISIT LOW MDM: CPT

## 2020-06-16 PROCEDURE — 99282 EMERGENCY DEPT VISIT SF MDM: CPT | Performed by: PHYSICIAN ASSISTANT

## 2020-06-16 PROCEDURE — 74022 RADEX COMPL AQT ABD SERIES: CPT

## 2020-06-16 PROCEDURE — 81001 URINALYSIS AUTO W/SCOPE: CPT | Performed by: PHYSICIAN ASSISTANT

## 2020-06-16 PROCEDURE — 81025 URINE PREGNANCY TEST: CPT | Performed by: PHYSICIAN ASSISTANT

## 2020-06-16 RX ORDER — MAGNESIUM CARB/ALUMINUM HYDROX 105-160MG
296 TABLET,CHEWABLE ORAL ONCE
Qty: 296 ML | Refills: 1 | Status: SHIPPED | OUTPATIENT
Start: 2020-06-16 | End: 2021-01-06 | Stop reason: ALTCHOICE

## 2020-06-19 ENCOUNTER — OFFICE VISIT (OUTPATIENT)
Dept: FAMILY MEDICINE CLINIC | Facility: CLINIC | Age: 35
End: 2020-06-19
Payer: COMMERCIAL

## 2020-06-19 VITALS
HEART RATE: 88 BPM | TEMPERATURE: 97.8 F | BODY MASS INDEX: 45.99 KG/M2 | SYSTOLIC BLOOD PRESSURE: 132 MMHG | OXYGEN SATURATION: 98 % | HEIGHT: 67 IN | WEIGHT: 293 LBS | DIASTOLIC BLOOD PRESSURE: 68 MMHG

## 2020-06-19 DIAGNOSIS — H69.83 EUSTACHIAN TUBE DYSFUNCTION, BILATERAL: ICD-10-CM

## 2020-06-19 DIAGNOSIS — K59.09 CHRONIC CONSTIPATION: ICD-10-CM

## 2020-06-19 DIAGNOSIS — J30.9 ALLERGIC RHINITIS, UNSPECIFIED SEASONALITY, UNSPECIFIED TRIGGER: Primary | ICD-10-CM

## 2020-06-19 DIAGNOSIS — E03.9 HYPOTHYROIDISM, UNSPECIFIED TYPE: ICD-10-CM

## 2020-06-19 PROCEDURE — 3078F DIAST BP <80 MM HG: CPT | Performed by: PHYSICIAN ASSISTANT

## 2020-06-19 PROCEDURE — 1036F TOBACCO NON-USER: CPT | Performed by: PHYSICIAN ASSISTANT

## 2020-06-19 PROCEDURE — 3075F SYST BP GE 130 - 139MM HG: CPT | Performed by: PHYSICIAN ASSISTANT

## 2020-06-19 PROCEDURE — 99213 OFFICE O/P EST LOW 20 MIN: CPT | Performed by: PHYSICIAN ASSISTANT

## 2020-06-19 PROCEDURE — 3008F BODY MASS INDEX DOCD: CPT | Performed by: PHYSICIAN ASSISTANT

## 2020-06-19 RX ORDER — LEVOTHYROXINE SODIUM 0.12 MG/1
TABLET ORAL
Qty: 90 TABLET | Refills: 0 | Status: SHIPPED | OUTPATIENT
Start: 2020-06-19 | End: 2020-09-14

## 2020-06-19 RX ORDER — DEXTROAMPHETAMINE SACCHARATE, AMPHETAMINE ASPARTATE, DEXTROAMPHETAMINE SULFATE AND AMPHETAMINE SULFATE 5; 5; 5; 5 MG/1; MG/1; MG/1; MG/1
20 TABLET ORAL DAILY
COMMUNITY
Start: 2020-06-17 | End: 2021-01-27 | Stop reason: CLARIF

## 2020-06-19 RX ORDER — CARIPRAZINE 6 MG/1
6 CAPSULE, GELATIN COATED ORAL DAILY
COMMUNITY
Start: 2020-06-01

## 2020-06-25 DIAGNOSIS — G25.81 RESTLESS LEG SYNDROME: ICD-10-CM

## 2020-06-25 RX ORDER — ROPINIROLE 4 MG/1
TABLET, FILM COATED ORAL
Qty: 60 TABLET | Refills: 5 | Status: SHIPPED | OUTPATIENT
Start: 2020-06-25 | End: 2020-10-23

## 2020-07-15 ENCOUNTER — TELEPHONE (OUTPATIENT)
Dept: GYNECOLOGY | Facility: CLINIC | Age: 35
End: 2020-07-15

## 2020-07-15 DIAGNOSIS — M62.89 PELVIC FLOOR DYSFUNCTION: Primary | ICD-10-CM

## 2020-07-15 NOTE — TELEPHONE ENCOUNTER
Pt wants to know if she can get another referral for pelvic floor therapy   She did have a referral from [facility] in 2018 and 2019

## 2020-07-16 DIAGNOSIS — G43.719 INTRACTABLE CHRONIC MIGRAINE WITHOUT AURA AND WITHOUT STATUS MIGRAINOSUS: ICD-10-CM

## 2020-07-16 RX ORDER — TOPIRAMATE 100 MG/1
TABLET, FILM COATED ORAL
Qty: 90 TABLET | Refills: 2 | Status: SHIPPED | OUTPATIENT
Start: 2020-07-16 | End: 2020-10-13

## 2020-07-27 ENCOUNTER — TELEPHONE (OUTPATIENT)
Dept: NEUROLOGY | Facility: CLINIC | Age: 35
End: 2020-07-27

## 2020-07-27 NOTE — TELEPHONE ENCOUNTER
pt called and states that we called her to reschedule appt with danielle and she is asking if she should maybe make an appt with dr Jayro Sam  she states that she has been having severe migraines pretty often  last one was last tuesday and didn't recover until yesterday  she states that all she did was sleep  these severe migraines are occuring at least once a month and she she gets extremely tired during these migraines  she does feel like these are brought on by stress  she did see Ophthalmology about 3-4 weeks ago and states that she didn't get dilated but they have a machine that is capable of looking at the eyes as if they are dialated but with out dilating  they did order vision therapy which she feels may have made her headaches worse  Did you want them checking the optic nerve? She states that she is to go back for a check up in about 2 weeks and she will speak to him more about his migraines  She feels that it would be best to come into the office for an appt instead of making adjustments over the phone  She is now  agreeable to making adjustments over the phone since appt is not until 9/24  I do not see any appts in the near future with dr Jayro Sam  I rescheduled pt to see danielle on 9/24  She states that she saw neuro-ophthalmology in the past and not sure if you would recommend she see them again  topamax 100mg 1 tab in am and 2 tab hs    She is agreeable to adding additional med even if her psych meds would need to be adjusted  She states that she spoke to her psychiatrist about this and they were agreeable     Please advise   [phone] to leave a detailed message

## 2020-08-09 ENCOUNTER — HOSPITAL ENCOUNTER (EMERGENCY)
Facility: HOSPITAL | Age: 35
Discharge: HOME | End: 2020-08-09
Attending: EMERGENCY MEDICINE | Admitting: EMERGENCY MEDICINE
Payer: COMMERCIAL

## 2020-08-09 VITALS
BODY MASS INDEX: 44.41 KG/M2 | OXYGEN SATURATION: 97 % | SYSTOLIC BLOOD PRESSURE: 155 MMHG | HEIGHT: 68 IN | HEART RATE: 81 BPM | WEIGHT: 293 LBS | TEMPERATURE: 97.2 F | RESPIRATION RATE: 18 BRPM | DIASTOLIC BLOOD PRESSURE: 85 MMHG

## 2020-08-09 DIAGNOSIS — G43.909 MIGRAINE HEADACHE: Primary | ICD-10-CM

## 2020-08-09 DIAGNOSIS — R53.83 FATIGUE: ICD-10-CM

## 2020-08-09 LAB
ALBUMIN SERPL BCP-MCNC: 4.1 G/DL (ref 3.5–5)
ALP SERPL-CCNC: 123 U/L (ref 46–116)
ALT SERPL W P-5'-P-CCNC: 34 U/L (ref 12–78)
ANION GAP SERPL CALCULATED.3IONS-SCNC: 10 MMOL/L (ref 4–13)
AST SERPL W P-5'-P-CCNC: 16 U/L (ref 5–45)
BASOPHILS # BLD AUTO: 0.09 THOUSANDS/ΜL (ref 0–0.1)
BASOPHILS NFR BLD AUTO: 1 % (ref 0–1)
BILIRUB SERPL-MCNC: 0.3 MG/DL (ref 0.2–1)
BUN SERPL-MCNC: 19 MG/DL (ref 5–25)
CALCIUM SERPL-MCNC: 9.8 MG/DL (ref 8.3–10.1)
CHLORIDE SERPL-SCNC: 104 MMOL/L (ref 100–108)
CO2 SERPL-SCNC: 25 MMOL/L (ref 21–32)
CREAT SERPL-MCNC: 0.94 MG/DL (ref 0.6–1.3)
EOSINOPHIL # BLD AUTO: 0.17 THOUSAND/ΜL (ref 0–0.61)
EOSINOPHIL NFR BLD AUTO: 1 % (ref 0–6)
ERYTHROCYTE [DISTWIDTH] IN BLOOD BY AUTOMATED COUNT: 14.6 % (ref 11.6–15.1)
GFR SERPL CREATININE-BSD FRML MDRD: 79 ML/MIN/1.73SQ M
GLUCOSE SERPL-MCNC: 107 MG/DL (ref 65–140)
HCT VFR BLD AUTO: 52.1 % (ref 34.8–46.1)
HGB BLD-MCNC: 16.2 G/DL (ref 11.5–15.4)
IMM GRANULOCYTES # BLD AUTO: 0.06 THOUSAND/UL (ref 0–0.2)
IMM GRANULOCYTES NFR BLD AUTO: 1 % (ref 0–2)
LYMPHOCYTES # BLD AUTO: 2.46 THOUSANDS/ΜL (ref 0.6–4.47)
LYMPHOCYTES NFR BLD AUTO: 20 % (ref 14–44)
MCH RBC QN AUTO: 27.6 PG (ref 26.8–34.3)
MCHC RBC AUTO-ENTMCNC: 31.1 G/DL (ref 31.4–37.4)
MCV RBC AUTO: 89 FL (ref 82–98)
MONOCYTES # BLD AUTO: 0.79 THOUSAND/ΜL (ref 0.17–1.22)
MONOCYTES NFR BLD AUTO: 6 % (ref 4–12)
NEUTROPHILS # BLD AUTO: 8.78 THOUSANDS/ΜL (ref 1.85–7.62)
NEUTS SEG NFR BLD AUTO: 71 % (ref 43–75)
NRBC BLD AUTO-RTO: 0 /100 WBCS
PLATELET # BLD AUTO: 339 THOUSANDS/UL (ref 149–390)
PMV BLD AUTO: 9.1 FL (ref 8.9–12.7)
POTASSIUM SERPL-SCNC: 3.9 MMOL/L (ref 3.5–5.3)
PROT SERPL-MCNC: 8.5 G/DL (ref 6.4–8.2)
RBC # BLD AUTO: 5.86 MILLION/UL (ref 3.81–5.12)
SODIUM SERPL-SCNC: 139 MMOL/L (ref 136–145)
TROPONIN I SERPL-MCNC: <0.02 NG/ML
WBC # BLD AUTO: 12.35 THOUSAND/UL (ref 4.31–10.16)

## 2020-08-09 PROCEDURE — 96365 THER/PROPH/DIAG IV INF INIT: CPT

## 2020-08-09 PROCEDURE — 36415 COLL VENOUS BLD VENIPUNCTURE: CPT | Performed by: EMERGENCY MEDICINE

## 2020-08-09 PROCEDURE — 96375 TX/PRO/DX INJ NEW DRUG ADDON: CPT

## 2020-08-09 PROCEDURE — 80053 COMPREHEN METABOLIC PANEL: CPT | Performed by: EMERGENCY MEDICINE

## 2020-08-09 PROCEDURE — 84484 ASSAY OF TROPONIN QUANT: CPT | Performed by: EMERGENCY MEDICINE

## 2020-08-09 PROCEDURE — 85025 COMPLETE CBC W/AUTO DIFF WBC: CPT | Performed by: EMERGENCY MEDICINE

## 2020-08-09 PROCEDURE — 99285 EMERGENCY DEPT VISIT HI MDM: CPT | Performed by: EMERGENCY MEDICINE

## 2020-08-09 PROCEDURE — 93005 ELECTROCARDIOGRAM TRACING: CPT

## 2020-08-09 PROCEDURE — 99284 EMERGENCY DEPT VISIT MOD MDM: CPT

## 2020-08-09 RX ORDER — DIPHENHYDRAMINE HYDROCHLORIDE 50 MG/ML
25 INJECTION INTRAMUSCULAR; INTRAVENOUS ONCE
Status: COMPLETED | OUTPATIENT
Start: 2020-08-09 | End: 2020-08-09

## 2020-08-09 RX ORDER — MAGNESIUM SULFATE 1 G/100ML
1 INJECTION INTRAVENOUS ONCE
Status: COMPLETED | OUTPATIENT
Start: 2020-08-09 | End: 2020-08-09

## 2020-08-09 RX ORDER — METOCLOPRAMIDE HYDROCHLORIDE 5 MG/ML
5 INJECTION INTRAMUSCULAR; INTRAVENOUS ONCE
Status: COMPLETED | OUTPATIENT
Start: 2020-08-09 | End: 2020-08-09

## 2020-08-09 RX ADMIN — SODIUM CHLORIDE 1000 ML: 0.9 INJECTION, SOLUTION INTRAVENOUS at 13:52

## 2020-08-09 RX ADMIN — METOCLOPRAMIDE HYDROCHLORIDE 5 MG: 5 INJECTION INTRAMUSCULAR; INTRAVENOUS at 13:52

## 2020-08-09 RX ADMIN — DIPHENHYDRAMINE HYDROCHLORIDE 25 MG: 50 INJECTION, SOLUTION INTRAMUSCULAR; INTRAVENOUS at 13:52

## 2020-08-09 RX ADMIN — MAGNESIUM SULFATE HEPTAHYDRATE 1 G: 1 INJECTION, SOLUTION INTRAVENOUS at 13:52

## 2020-08-09 NOTE — ED PROVIDER NOTES
History  Chief Complaint   Patient presents with    Headache     pt has had a headache since tuesday, does have history of migraines, feels similar but is lasting longer than usual; states she has not taken her medications in awhile due to not feeling well     79-year-old female with a history of chronic migraine headaches possibly pseudotumor cerebral presents complaining of a headache which she has had since Tuesday  Patient actually states the headache is now completely resolved as of this morning  Patient states she is here because she has not been taking her other medications due to her headache and nausea  She states specifically that she has not been taking her psychiatric medications  She is concerned that she is dehydrated and that is making her feel generally unwell  She denies chest pain shortness of breath or any other specific concerns  She just states she feels tired"  Patient refused a CT scan head today stating that she just had a CT scan on 05/27 which demonstrated the following: IMPRESSION:   "1  No evidence of acute intracranial hemorrhage  2  No evidence of hemodynamic significant stenosis, aneurysm or dissection given limitations as described above  If symptoms persist or worsen, consider follow-up brain/orbital MRI "  She states she is scheduled to see Neurology but they canceled because of current Global situation  She denies any headache at all at this time  She presents with her mother for blood work and fluids"          History provided by:  Patient (Mother)  Headache   Pain location:  Generalized  Quality:  Dull  Radiates to: Now completely resolved    Severity currently:  0/10  Severity at highest:  0/10  Onset quality:  Gradual  Timing:  Intermittent  Progression:  Waxing and waning  Context: activity    Relieved by:  Nothing  Worsened by:  Nothing  Ineffective treatments:  None tried  Associated symptoms: fatigue    Associated symptoms: no abdominal pain, no back pain, no blurred vision and no congestion    Risk factors: no anger and no family hx of SAH        Prior to Admission Medications   Prescriptions Last Dose Informant Patient Reported? Taking? Cholecalciferol (VITAMIN D3) 5000 units CAPS  Self Yes No   Sig: Take 5,000 Units by mouth daily   HYDROcodone-acetaminophen (NORCO) 5-325 mg per tablet  Self Yes No   Sig: Take 1 tablet by mouth 2 (two) times a day as needed for pain (severe pain)   LORazepam (ATIVAN) 1 mg tablet  Self Yes No   Sig: Take 1 mg by mouth 3 (three) times a day as needed for anxiety   Multiple Vitamin (MULTIVITAMIN) capsule  Self Yes No   Sig: Take 1 capsule by mouth daily   RESTASIS 0 05 % ophthalmic emulsion  Self Yes No   Sig: PLACE 1 DROP INTO BOTH EYES TWICE A DAY   VRAYLAR 4 5 MG capsule   Yes No   Sig: Take 4 5 mg by mouth daily   amphetamine-dextroamphetamine (ADDERALL) 20 mg tablet   Yes No   buPROPion (WELLBUTRIN XL) 300 mg 24 hr tablet  Self Yes No   clomiPRAMINE (ANAFRANIL) 50 mg capsule  Self Yes No   Sig: Take 100 mg by mouth 2 (two) times a day   gabapentin (NEURONTIN) 600 MG tablet   Yes No   Sig: TAKE 1/2 TABLET DAILY IN AM AND 1 AND 1/2 TABLET AT BEDTIME   ketorolac (TORADOL) 10 mg tablet   No No   Sig: Take 1 tablet (10 mg total) by mouth every 6 (six) hours as needed for moderate pain (migraine headaches)   levothyroxine 125 mcg tablet   No No   Sig: TAKE 1 TABLET BY MOUTH EVERY DAY IN THE MORNING   loratadine (CLARITIN) 10 mg tablet  Self No No   Sig: Take 1 tablet (10 mg total) by mouth daily   magnesium citrate (CITROMA) 1 745 g/30 mL oral solution   No No   Sig: Take 296 mL by mouth once for 1 dose   meloxicam (MOBIC) 7 5 mg tablet  Self No No   Sig: TAKE 1 AFTER BREAKFAST AND DINNER DAILY AS NEEDED FOR PAIN AND SCIATICA     omeprazole (PriLOSEC) 20 mg delayed release capsule   No No   Sig: Take 1 capsule (20 mg total) by mouth 2 (two) times a day   prochlorperazine (COMPAZINE) 10 mg tablet  Self No No   Si tab at onset of migraine, can repeat in 8 hours, can take with triptan/NSAID   rOPINIRole (REQUIP) 4 mg tablet   No No   Sig: TAKE 1 TABLET BY MOUTH TWICE A DAY   sertraline (ZOLOFT) 100 mg tablet  Self Yes No   Sig: Take 200 mg by mouth daily   topiramate (TOPAMAX) 100 mg tablet   No No   Sig: Take 100mg in the morning and 200mg in the evening by mouth daily        Facility-Administered Medications: None       Past Medical History:   Diagnosis Date    Anorexia nervosa in remission     Anxiety     Back pain     [facility] disorder ([facility] [address] )     Depression     Esophageal reflux 8/15/2013    Hypertension     Hypothyroid     Idiopathic intracranial hypertension     Lumbar degenerative disc disease 5/8/2014    Migraine     Obesity     Obsessive-compulsive disorder     Overactive bladder     Psychiatric disorder     Restless leg syndrome, controlled     Seasonal allergies     Suicide and self-inflicted injury [facility])        Past Surgical History:   Procedure Laterality Date    DENTAL SURGERY      IR LUMBAR PUNCTURE  2/26/2019    SINUS ENDOSCOPY      TONSILLECTOMY         Family History   Problem Relation Age of Onset    Depression Mother     Suicide Attempts Mother     Hypertension Mother     Diabetes Mother     Other Mother         bicuspid aortic valve    Hypertension Father     Hypothyroidism Father     Mental illness Father     Thyroid disease Father     Hypertension Brother     Cancer Maternal Grandmother     Heart disease Maternal Grandmother     Stroke Maternal Grandmother     Breast cancer Maternal Grandmother     Skin cancer Maternal Grandmother     Pneumonia Maternal Grandfather     Cancer Maternal Grandfather     Cancer Paternal Grandmother     Pneumonia Paternal Grandfather     Arthritis Family     Breast cancer Family     Osteopenia Family     Osteoporosis Family     Hypertension Family     Transient ischemic attack Family      I have reviewed and agree with the history as documented  E-Cigarette/Vaping    E-Cigarette Use Never User      E-Cigarette/Vaping Substances     Social History     Tobacco Use    Smoking status: Never Smoker    Smokeless tobacco: Never Used   Substance Use Topics    Alcohol use: Not Currently    Drug use: No       Review of Systems   Constitutional: Positive for fatigue  HENT: Negative for congestion  Eyes: Negative  Negative for blurred vision  Respiratory: Negative  Cardiovascular: Negative  Gastrointestinal: Negative for abdominal pain  Endocrine: Negative  Genitourinary: Negative  Musculoskeletal: Negative for back pain  Skin: Negative  Allergic/Immunologic: Negative  Neurological: Positive for headaches  Hematological: Negative  Psychiatric/Behavioral: Negative  All other systems reviewed and are negative  Physical Exam  Physical Exam  Vitals signs and nursing note reviewed  Constitutional:       General: She is not in acute distress  Appearance: She is not ill-appearing  HENT:      Head: Normocephalic  Right Ear: External ear normal       Left Ear: External ear normal       Nose: No congestion or rhinorrhea  Mouth/Throat:      Mouth: Mucous membranes are moist       Pharynx: No oropharyngeal exudate or posterior oropharyngeal erythema  Eyes:      General:         Right eye: No discharge  Left eye: No discharge  Pupils: Pupils are equal, round, and reactive to light  Neck:      Musculoskeletal: Normal range of motion  No neck rigidity or muscular tenderness  Cardiovascular:      Rate and Rhythm: Normal rate  Pulses: Normal pulses  Pulmonary:      Effort: Pulmonary effort is normal  No respiratory distress  Breath sounds: No stridor  No wheezing or rhonchi  Abdominal:      General: Abdomen is flat  There is no distension  Palpations: There is no mass  Musculoskeletal: Normal range of motion  General: No swelling or tenderness     Skin: General: Skin is warm  Coloration: Skin is not jaundiced or pale  Findings: No bruising or erythema  Neurological:      General: No focal deficit present  Mental Status: She is alert  Cranial Nerves: No cranial nerve deficit  Sensory: No sensory deficit  Motor: No weakness  Coordination: Coordination normal       Gait: Gait normal       Deep Tendon Reflexes: Reflexes normal    Psychiatric:         Mood and Affect: Mood normal          Thought Content:  Thought content normal          Vital Signs  ED Triage Vitals [08/09/20 1248]   Temperature Pulse Respirations Blood Pressure SpO2   (!) 97 2 °F (36 2 °C) 93 18 (!) 174/92 97 %      Temp Source Heart Rate Source Patient Position - Orthostatic VS BP Location FiO2 (%)   Temporal Monitor Sitting Right arm --      Pain Score       3           Vitals:    08/09/20 1248   BP: (!) 174/92   Pulse: 93   Patient Position - Orthostatic VS: Sitting         Visual Acuity      ED Medications  Medications   sodium chloride 0 9 % bolus 1,000 mL (1,000 mL Intravenous New Bag 8/9/20 1352)   sodium chloride 0 9 % bolus 1,000 mL (1,000 mL Intravenous New Bag 8/9/20 1352)   metoclopramide (REGLAN) injection 5 mg (5 mg Intravenous Given 8/9/20 1352)   diphenhydrAMINE (BENADRYL) injection 25 mg (25 mg Intravenous Given 8/9/20 1352)   magnesium sulfate IVPB (premix) SOLN 1 g (1 g Intravenous New Bag 8/9/20 1352)       Diagnostic Studies  Results Reviewed     Procedure Component Value Units Date/Time    Comprehensive metabolic panel [[id number]]  (Abnormal) Collected:  08/09/20 1348    Lab Status:  Final result Specimen:  Blood from Arm, Right Updated:  08/09/20 1416     Sodium 139 mmol/L      Potassium 3 9 mmol/L      Chloride 104 mmol/L      CO2 25 mmol/L      ANION GAP 10 mmol/L      BUN 19 mg/dL      Creatinine 0 94 mg/dL      Glucose 107 mg/dL      Calcium 9 8 mg/dL      AST 16 U/L      ALT 34 U/L      Alkaline Phosphatase 123 U/L      Total Protein 8 5 g/dL      Albumin 4 1 g/dL      Total Bilirubin 0 30 mg/dL      eGFR 79 ml/min/1 73sq m     Narrative:       National Kidney Disease Foundation guidelines for Chronic Kidney Disease (CKD):     Stage 1 with normal or high GFR (GFR > 90 mL/min/1 73 square meters)    Stage 2 Mild CKD (GFR = 60-89 mL/min/1 73 square meters)    Stage 3A Moderate CKD (GFR = 45-59 mL/min/1 73 square meters)    Stage 3B Moderate CKD (GFR = 30-44 mL/min/1 73 square meters)    Stage 4 Severe CKD (GFR = 15-29 mL/min/1 73 square meters)    Stage 5 End Stage CKD (GFR <15 mL/min/1 73 square meters)  Note: GFR calculation is accurate only with a steady state creatinine    Troponin I [[id number]]  (Normal) Collected:  08/09/20 1348    Lab Status:  Final result Specimen:  Blood from Arm, Right Updated:  08/09/20 1413     Troponin I <0 02 ng/mL     CBC and differential [[id number]]  (Abnormal) Collected:  08/09/20 1348    Lab Status:  Final result Specimen:  Blood from Arm, Right Updated:  08/09/20 1356     WBC 12 35 Thousand/uL      RBC 5 86 Million/uL      Hemoglobin 16 2 g/dL      Hematocrit 52 1 %      MCV 89 fL      MCH 27 6 pg      MCHC 31 1 g/dL      RDW 14 6 %      MPV 9 1 fL      Platelets 865 Thousands/uL      nRBC 0 /100 WBCs      Neutrophils Relative 71 %      Immat GRANS % 1 %      Lymphocytes Relative 20 %      Monocytes Relative 6 %      Eosinophils Relative 1 %      Basophils Relative 1 %      Neutrophils Absolute 8 78 Thousands/µL      Immature Grans Absolute 0 06 Thousand/uL      Lymphocytes Absolute 2 46 Thousands/µL      Monocytes Absolute 0 79 Thousand/µL      Eosinophils Absolute 0 17 Thousand/µL      Basophils Absolute 0 09 Thousands/µL     POCT pregnancy, urine [[id number]]     Lab Status:  No result                  No orders to display              Procedures  ECG 12 Lead Documentation Only    Date/Time: 8/9/2020 2:55 PM  Performed by: Ted Uribe DO  Authorized by: Ted Uribe DO     Indications / Diagnosis: Fatigue   ECG reviewed by me, the ED Provider: yes    Patient location:  ED  Previous ECG:     Previous ECG:  Unavailable  Interpretation:     Interpretation: non-specific    Rate:     ECG rate:  68    ECG rate assessment: normal    Rhythm:     Rhythm: sinus rhythm               ED Course       US AUDIT      Most Recent Value   Initial Alcohol Screen: US AUDIT-C    1  How often do you have a drink containing alcohol?  0 Filed at: 08/09/2020 1251   2  How many drinks containing alcohol do you have on a typical day you are drinking? 0 Filed at: 08/09/2020 1251   3b  FEMALE Any Age, or MALE 65+: How often do you have 4 or more drinks on one occassion? 0 Filed at: 08/09/2020 1251   Audit-C Score  0 Filed at: 08/09/2020 1251            HEART Risk Score      Most Recent Value   Heart Score Risk Calculator   History  1 Filed at: 08/09/2020 1456   ECG  0 Filed at: 08/09/2020 1456   Age  0 Filed at: 08/09/2020 1456   Risk Factors  0 Filed at: 08/09/2020 1456   Troponin  0 Filed at: 08/09/2020 1456   HEART Score  1 Filed at: 08/09/2020 1456            JENNIFER/DAST-10      Most Recent Value   How many times in the past year have you    Used an illegal drug or used a prescription medication for non-medical reasons? Never Filed at: 08/09/2020 1251                                MDM  Number of Diagnoses or Management Options  Fatigue:   Migraine headache:   Diagnosis management comments: Patient refused CT scan her head  She states that her headache is actually completely resolved  She states she is concerned that "she needs fluids and have her blood work checked "    Differential diagnosis 1  Electrolyte abnormality 2  Acute renal insult 3  Acute coronary syndrome         Amount and/or Complexity of Data Reviewed  Clinical lab tests: ordered and reviewed  Tests in the radiology section of CPT®: ordered and reviewed  Tests in the medicine section of CPT®: reviewed and ordered    Risk of Complications, Morbidity, and/or Mortality  Presenting problems: high  Diagnostic procedures: high  Management options: high          Disposition  Final diagnoses:   Migraine headache   Fatigue     Time reflects when diagnosis was documented in both MDM as applicable and the Disposition within this note     Time User Action Codes Description Comment    8/9/2020  2:26 PM Runell Benji Add [G43 [id number]] Migraine headache     8/9/2020  2:26 PM Runell [place] Add [R53 [id number]] Fatigue       ED Disposition     ED Disposition Condition Date/Time Comment    Discharge Stable Sun Aug 9, 2020  2:26 PM Marcie Claudio discharge to home/self care  Follow-up Information    None         Patient's Medications   Discharge Prescriptions    No medications on file     No discharge procedures on file      PDMP Review     None          ED Provider  Electronically Signed by           Justice Mcclure DO  08/09/20 4056

## 2020-08-11 LAB
ATRIAL RATE: 68 BPM
P AXIS: 37 DEGREES
PR INTERVAL: 186 MS
QRS AXIS: 7 DEGREES
QRSD INTERVAL: 96 MS
QT INTERVAL: 400 MS
QTC INTERVAL: 425 MS
T WAVE AXIS: 34 DEGREES
VENTRICULAR RATE: 68 BPM

## 2020-08-11 PROCEDURE — 93010 ELECTROCARDIOGRAM REPORT: CPT | Performed by: INTERNAL MEDICINE

## 2020-08-13 ENCOUNTER — OFFICE VISIT (OUTPATIENT)
Dept: FAMILY MEDICINE CLINIC | Facility: CLINIC | Age: 35
End: 2020-08-13
Payer: COMMERCIAL

## 2020-08-13 VITALS
TEMPERATURE: 96.8 F | HEART RATE: 89 BPM | SYSTOLIC BLOOD PRESSURE: 142 MMHG | BODY MASS INDEX: 44.41 KG/M2 | OXYGEN SATURATION: 94 % | WEIGHT: 293 LBS | DIASTOLIC BLOOD PRESSURE: 84 MMHG | HEIGHT: 68 IN

## 2020-08-13 DIAGNOSIS — G47.33 OSA (OBSTRUCTIVE SLEEP APNEA): Primary | ICD-10-CM

## 2020-08-13 DIAGNOSIS — E66.01 MORBID OBESITY WITH BMI OF 60.0-69.9, ADULT (HCC): ICD-10-CM

## 2020-08-13 DIAGNOSIS — G43.709 CHRONIC MIGRAINE WITHOUT AURA WITHOUT STATUS MIGRAINOSUS, NOT INTRACTABLE: ICD-10-CM

## 2020-08-13 PROCEDURE — 3079F DIAST BP 80-89 MM HG: CPT | Performed by: PHYSICIAN ASSISTANT

## 2020-08-13 PROCEDURE — 3077F SYST BP >= 140 MM HG: CPT | Performed by: PHYSICIAN ASSISTANT

## 2020-08-13 PROCEDURE — 99214 OFFICE O/P EST MOD 30 MIN: CPT | Performed by: PHYSICIAN ASSISTANT

## 2020-08-13 PROCEDURE — 1036F TOBACCO NON-USER: CPT | Performed by: PHYSICIAN ASSISTANT

## 2020-08-13 PROCEDURE — 3008F BODY MASS INDEX DOCD: CPT | Performed by: PHYSICIAN ASSISTANT

## 2020-08-13 NOTE — PROGRESS NOTES
Assessment/Plan:    Problem List Items Addressed This Visit        Respiratory    CAIO (obstructive sleep apnea) - Primary       Cardiovascular and Mediastinum    Chronic migraine without aura without status migrainosus, not intractable      Other Visit Diagnoses     Morbid obesity with BMI of 60 0-69 9, adult ([facility] [address] )               Diagnoses and all orders for this visit:    CAIO (obstructive sleep apnea)    Chronic migraine without aura without status migrainosus, not intractable    Morbid obesity with BMI of 60 0-69 9, adult ([facility] )        I advised her that she needs to follow-up with her neurologist concerning her headaches and history of pseudotumor cerebri  I encouraged her to continue to follow with her ophthalmologist    She will also call to reschedule her appointment with sleep medicine  I feel as though her untreated sleep apnea may also be contributing to her frequent headaches  Subjective:      Patient ID: Megan Nava is a 27 y o  female  Nicki Sousa is 27year old female who is here today for a follow-up from the ER for migraines  They did not perform imaging because it was similar to previous headaches  She has been getting migraines about 2 times per month  She has a neurologist who has been treating her for her headaches, but they can not get her in until September  She states that the headaches are very debilitating and leave her unable to function for 3-4 days  She states that they start in the front of her head and sometimes she sees red and blue spots  The headaches do usually resolve on their own  Her most recent headache was no different than her previous migraines  She also recently saw her ophthalmologist  He put her on Diamox twice daily, but she was unable to tolerate it because she was becoming too dry on the medication  She already suffers from dry mouth as a side effect from her psych medications  She does have a history of pseudotumor cerebri   She claims that her last spinal tap was done in the ER in 2019  She also has sleep apnea that is uncontrolled  She was supposed to have an appointment with sleep medicine, but missed it  She does not have a CPAP machine  She currently does not have a headache, blurry vision, dizziness, lightheadedness, or balance problems  The following portions of the patient's history were reviewed and updated as appropriate:   She has a past medical history of Anorexia nervosa in remission, Anxiety, Back pain, Bipolar disorder ([facility] [address] ), Depression, Esophageal reflux (8/15/2013), Hypertension, Hypothyroid, Idiopathic intracranial hypertension, Lumbar degenerative disc disease (5/8/2014), Migraine, Obesity, Obsessive-compulsive disorder, Overactive bladder, Psychiatric disorder, Restless leg syndrome, controlled, Seasonal allergies, and Su[facility] and [facility]-inflicted [facility] ([facility] )  ,  does not have any pertinent problems on file  ,   has a past surgical history that includes Tonsillectomy; Sinus endoscopy; Dental surgery; and IR lumbar puncture (2/26/2019)  ,  family history includes Arthritis in her family; Breast cancer in her family and maternal grandmother; Cancer in her maternal grandfather, maternal grandmother, and paternal grandmother; Depression in her mother; Diabetes in her mother; Heart disease in her maternal grandmother; Hypertension in her brother, family, father, and mother; Hypothyroidism in her father; Mental illness in her father; Osteopenia in her family; Osteoporosis in her family; Other in her mother; Pneumonia in her maternal grandfather and paternal grandfather; Skin cancer in her maternal grandmother; Stroke in her maternal grandmother; Suicide Attempts in her mother; Thyroid disease in her father; Transient ischemic attack in her family  ,   reports that she has never smoked  She has never used smokeless tobacco  She reports previous alcohol use  She reports that she does not use drugs  ,  is allergic to doxycycline and other     Current Outpatient Medications   Medication Sig Dispense Refill    amphetamine-dextroamphetamine (ADDERALL) 20 mg tablet       buPROPion (WELLBUTRIN XL) 300 mg 24 hr tablet       Cholecalciferol (VITAMIN D3) 5000 units CAPS Take 5,000 Units by mouth daily      clomiPRAMINE (ANAFRANIL) 50 mg capsule Take 100 mg by mouth 2 (two) times a day      gabapentin (NEURONTIN) 600 MG tablet TAKE 1/2 TABLET DAILY IN AM AND 1 AND 1/2 TABLET AT BEDTIME      HYDROcodone-acetaminophen (NORCO) 5-325 mg per tablet Take 1 tablet by mouth 2 (two) times a day as needed for pain (severe pain)      ketorolac (TORADOL) 10 mg tablet Take 1 tablet (10 mg total) by mouth every 6 (six) hours as needed for moderate pain (migraine headaches) 20 tablet 0    levothyroxine 125 mcg tablet TAKE 1 TABLET BY MOUTH EVERY DAY IN THE MORNING 90 tablet 0    loratadine (CLARITIN) 10 mg tablet Take 1 tablet (10 mg total) by mouth daily 90 tablet 3    LORazepam (ATIVAN) 1 mg tablet Take 1 mg by mouth 3 (three) times a day as needed for anxiety      meloxicam (MOBIC) 7 5 mg tablet TAKE 1 AFTER BREAKFAST AND DINNER DAILY AS NEEDED FOR PAIN AND SCIATICA  60 tablet 0    Multiple Vitamin (MULTIVITAMIN) capsule Take 1 capsule by mouth daily      omeprazole (PriLOSEC) 20 mg delayed release capsule Take 1 capsule (20 mg total) by mouth 2 (two) times a day 180 capsule 1    prochlorperazine (COMPAZINE) 10 mg tablet 1 tab at onset of migraine, can repeat in 8 hours, can take with triptan/NSAID 20 tablet 3    RESTASIS 0 05 % ophthalmic emulsion PLACE 1 DROP INTO BOTH EYES TWICE A DAY  3    rOPINIRole (REQUIP) 4 mg tablet TAKE 1 TABLET BY MOUTH TWICE A DAY 60 tablet 5    sertraline (ZOLOFT) 100 mg tablet Take 200 mg by mouth daily  0    topiramate (TOPAMAX) 100 mg tablet Take 100mg in the morning and 200mg in the evening by mouth daily   90 tablet 2    VRAYLAR 4 5 MG capsule Take 4 5 mg by mouth daily      magnesium citrate (CITROMA) 1 745 g/30 mL oral solution Take 296 mL by mouth once for 1 dose 296 mL 1     No current facility-administered medications for this visit  Review of Systems   Constitutional: Negative for chills, diaphoresis, fatigue and fever  HENT: Negative for congestion, ear pain, postnasal drip, rhinorrhea, sneezing, sore throat and trouble swallowing  +Dry Mouth   Eyes: Negative for pain and visual disturbance  Respiratory: Negative for apnea, cough, shortness of breath and wheezing  Cardiovascular: Negative for chest pain and palpitations  Gastrointestinal: Negative for abdominal pain, constipation, diarrhea, nausea and vomiting  Genitourinary: Negative for dysuria and hematuria  Musculoskeletal: Negative for arthralgias, gait problem and myalgias  Neurological: Positive for headaches  Negative for dizziness, syncope, weakness, light-headedness and numbness  Psychiatric/Behavioral: Positive for sleep disturbance  Negative for suicidal ideas  The patient is nervous/anxious  Objective:  Vitals:    08/13/20 1009   BP: 142/84   Pulse: 89   Temp: (!) 96 8 °F (36 °C)   SpO2: 94%   Weight: (!) 192 kg (423 lb)   Height: 5' 8" (1 727 m)     Body mass index is 64 32 kg/m²  Physical Exam  Vitals signs and nursing note reviewed  Constitutional:       Appearance: Normal appearance  She is well-developed  She is obese  HENT:      Head: Normocephalic and atraumatic  Right Ear: Tympanic membrane, ear canal and external ear normal       Left Ear: Tympanic membrane, ear canal and external ear normal       Nose: Nose normal       Mouth/Throat:      Pharynx: No oropharyngeal exudate or posterior oropharyngeal erythema  Eyes:      Pupils: Pupils are equal, round, and reactive to light  Neck:      Musculoskeletal: Normal range of motion and neck supple  Cardiovascular:      Rate and Rhythm: Normal rate and regular rhythm  Heart sounds: Normal heart sounds  No murmur  No friction rub  No gallop      Pulmonary: Effort: Pulmonary effort is normal  No respiratory distress  Breath sounds: Normal breath sounds  No wheezing or rales  Musculoskeletal: Normal range of motion  Lymphadenopathy:      Cervical: No cervical adenopathy  Skin:     General: Skin is warm and dry  Neurological:      Mental Status: She is alert and oriented to person, place, and time  Psychiatric:         Mood and Affect: Mood is anxious  Speech: Speech is tangential          Behavior: Behavior normal          Thought Content: Thought content normal          Judgment: Judgment normal       Comments: Poor insight         BMI Counseling: Body mass index is 64 32 kg/m²  The BMI is above normal  Nutrition recommendations include reducing portion sizes, decreasing overall calorie intake, 3-5 servings of fruits/vegetables daily, consuming healthier snacks, decreasing soda and/or juice intake and moderation in carbohydrate intake  Exercise recommendations include exercising 3-5 times per week

## 2020-08-24 ENCOUNTER — TELEPHONE (OUTPATIENT)
Dept: NEUROLOGY | Facility: CLINIC | Age: 35
End: 2020-08-24

## 2020-08-25 ENCOUNTER — TELEPHONE (OUTPATIENT)
Dept: OTOLARYNGOLOGY | Facility: CLINIC | Age: 35
End: 2020-08-25

## 2020-08-25 NOTE — TELEPHONE ENCOUNTER
Has she seen Ophthalmology lately to make sure she doesn't have any worsening of her eye exam  We could try increasing the topiramate to 200mg in the morning and evening and see if that decreases the migraines  Thanks

## 2020-08-25 NOTE — TELEPHONE ENCOUNTER
I called and left message for the patient to call the office back regarding an appointment with sleep medicine

## 2020-08-26 NOTE — TELEPHONE ENCOUNTER
she had 2 full exams with ophthalmology, on 7/29 and 8/12  they feel that there was some swelling but nothing major  and they told her that her eyes were looking better as far as the optic nerve goes compared to 2019  Ophthalmology told her that the reaction time in regards to her eyes was possibly  being slowed down due to topamax dose  Psychiatrist said that lasix may help her  She states that she lost 20lb in 5 days after she took 3 5 days of diamox  Please advise if you would still want her to increase topiramate  She is asking if she can speak to you directly regarding everything that is going on  Or if there is another provider that she can see since a sooner appt with you is not available    I do not see any sooner appt with you, Jelly Mills or sharee  [phone] to leave a detailed message

## 2020-08-27 NOTE — TELEPHONE ENCOUNTER
Pt accepted appt for 9/1 at 11:30  Will have management build schedule and add pt   She is asking if you can order toradol 10mg  She states that she only has 1 left  She states that an ER dr ordered this in the past and this does help some  It is the only thing that helps a little     Please advise

## 2020-08-31 ENCOUNTER — TELEPHONE (OUTPATIENT)
Dept: NEUROLOGY | Facility: CLINIC | Age: 35
End: 2020-08-31

## 2020-09-01 ENCOUNTER — TELEPHONE (OUTPATIENT)
Dept: NEUROLOGY | Facility: CLINIC | Age: 35
End: 2020-09-01

## 2020-09-01 ENCOUNTER — OFFICE VISIT (OUTPATIENT)
Dept: NEUROLOGY | Facility: CLINIC | Age: 35
End: 2020-09-01
Payer: COMMERCIAL

## 2020-09-01 VITALS
HEART RATE: 82 BPM | WEIGHT: 293 LBS | HEIGHT: 68 IN | BODY MASS INDEX: 44.41 KG/M2 | TEMPERATURE: 97.6 F | SYSTOLIC BLOOD PRESSURE: 143 MMHG | DIASTOLIC BLOOD PRESSURE: 80 MMHG

## 2020-09-01 DIAGNOSIS — G93.2 IDIOPATHIC INTRACRANIAL HYPERTENSION: ICD-10-CM

## 2020-09-01 DIAGNOSIS — G43.709 CHRONIC MIGRAINE WITHOUT AURA WITHOUT STATUS MIGRAINOSUS, NOT INTRACTABLE: Primary | ICD-10-CM

## 2020-09-01 PROCEDURE — 99215 OFFICE O/P EST HI 40 MIN: CPT | Performed by: PHYSICIAN ASSISTANT

## 2020-09-01 RX ORDER — FUROSEMIDE 20 MG/1
20 TABLET ORAL DAILY
Qty: 30 TABLET | Refills: 3 | Status: SHIPPED | OUTPATIENT
Start: 2020-09-01 | End: 2020-10-14 | Stop reason: SDUPTHER

## 2020-09-01 RX ORDER — FEXOFENADINE HCL 180 MG/1
180 TABLET ORAL DAILY
COMMUNITY
End: 2021-04-20 | Stop reason: ALTCHOICE

## 2020-09-01 NOTE — PROGRESS NOTES
Patient ID: Avril Pearce is a 38 y o  female  Assessment/Plan:    Chronic migraine without aura without status migrainosus, not intractable  · Pt is having greater than 15 debilitating migraines per month  · These migraines last > 4 hours at a time  · She has tried and failed multiple preventative medications  · Authorization for Botox will be obtained from her insurance company  · She will be scheduled with the next available physician for Botox injections  I have spent 45 minutes with Patient and family today in which greater than 50% of this time was spent in counseling/coordination of care regarding Diagnostic results, Prognosis, Risks and benefits of tx options, Intructions for management, Patient and family education, Importance of tx compliance, Risk factor reductions and Impressions  She will follow-up in 3 months  Idiopathic intracranial hypertension  · Pt did not tolerate Diamox  · A trial of furosemide will be started  · Her renal function is normal   · She will take furosemide 20mg daily  · Side effects were reviewed  · She will call in 1-2 weeks with an update  Problem List Items Addressed This Visit        Cardiovascular and Mediastinum    Chronic migraine without aura without status migrainosus, not intractable - Primary     · Pt is having greater than 15 debilitating migraines per month  · These migraines last > 4 hours at a time  · She has tried and failed multiple preventative medications  · Authorization for Botox will be obtained from her insurance company  · She will be scheduled with the next available physician for Botox injections    I have spent 45 minutes with Patient and family today in which greater than 50% of this time was spent in counseling/coordination of care regarding Diagnostic results, Prognosis, Risks and benefits of tx options, Intructions for management, Patient and family education, Importance of tx compliance, Risk factor reductions and Impressions  She will follow-up in 3 months  Relevant Orders    Chemodenervation       Nervous and Auditory    Idiopathic intracranial hypertension     · Pt did not tolerate Diamox  · A trial of furosemide will be started  · Her renal function is normal   · She will take furosemide 20mg daily  · Side effects were reviewed  · She will call in 1-2 weeks with an update  Relevant Medications    furosemide (LASIX) 20 mg tablet    Other Relevant Orders    Chemodenervation             Subjective:    HPI    We had the pleasure of evaluating Megan Nava in neurological consultation today for headaches  As you know,  she is a 38 y o  right handed  female  Patient currently lives with her friend  She is here today for evaluation of her headaches  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  She is on disability at this time for her underlying mood disorders  Pt reports that she has been having headaches since late last summer that cause significant fatigue  They did decrease but this summer resumed again  The end of July she reports a severe headache  She can sleep up to 5-7 days when she has a headache  She tries to work through the headache but is too fatigued to participate in activities for very long  She did see Ophthalmology and her optic nerve swelling was not significant  She did try Diamox but she was not able to tolerate it due to side effects  She had a headache 2 weeks ago and she ended up in the ER  She reports that since being in the ED she has had 2 migraines  They last 3-4 days at a time  Associated symptoms include nausea and fatigue  Pain starts at the back of her neck and into her eyes  The headache eventually settles at the top of her head       Anxiety:  Patient has longstanding history of anxiety and was diagnosed with bipolar disorder in the past   Patient states that is a missed diagnosis as she was recently given the diagnosis of obsessive-compulsive disorder  She overall feels that her underlying psychiatric problems are well controlled  She is on the right medication at this time  Did complete Transcranial Magnetic Stimulation which significantly helped her underlying mood disorders  - regularly sees a therapist and a psychiatrist       Weight:   Patient states that she has always been overweight  In 2006 she lost about 135 lb as she was trying to lose weight but ended up becoming anorexic  Following that she was hospitalized for the eating disorder and later ended up gaining about 200 lb  Has had difficulty losing weight since  She has seen weight management therapist and sees one now at this time  She seems well educated with the diagnosis of idiopathic intracranial hypertension and knows that weight loss is one of the treatments  Any family history of migraines? No  Any family history of aneurysms? No     Idiopathic intracranial hypertension:  She is given the diagnosis back when she was 11years of age with opening pressure of 42 mm of water  Was on Diamox at that time  Her recent lumbar puncture showed an opening pressure of 17  Not documented in the note this number is per patient  Ophthalmology-  - sees an ophthalmologist who did inform the patient that she has left optic nerve swelling only  Chronic daily headaches/chronic migraine headaches:  What is your current pain level - 2/10     Headaches started at what age? 8years old but when she was 14 they got much worse  When she was 15 she was given the diagnosis of IIH and LP done then and had OP of 42 mm of water  She states she was put on diamox and then symptoms resolved  She states back in fall of 2018 they started again and then started to have loss of vision on the left side when she moved quickly or when she bent over  She was seen by ophthalmology and told that she had optic nerve swelling on the left but not right  How often do the headaches occur?  Every week but migraines last 3-4 days at a time  Pt has >15 migraines per month  What time of the day do the headaches start? Headache can be present upon awakening or comes on during the day  How long do the headaches last? 5 day headache cycle     Are you ever headache free? Yes  Aura and how long does it last - none     Describe your usual headache - Pressure, throbbing     Where is your headache located? Frontal and orbital region mostly but can also be in the neck and occipital area  What is the intensity of pain? 6/10 on average     Associated symptoms:   · Decrease of appetite  · Photophobia, phonophobia, sensitivity to smell   · Blind spot in her vision  · Tinnitus  · light-headed or dizzy, stiff or sore neck,   · prefer to be alone and in a dark room, unable to work     Number of days missed per month because of headaches:  Work (or school) days: Not presently working  Social or Family activities: often     Headache are worse if the patient: cough, sneeze, bending over, may make the vision worse as well  Headache triggers:  Cold weather, Lack of sleep, skipping meals  What time of the year do headaches occur more frequently? None     Have you seen someone else for headaches or pain? Yes  Have you had trigger point injection performed and how often? No  Have you had Botox injection performed and how often? No   Have you had epidural injections or transforaminal injections performed? No     What medications do you take or have you taken for your headaches? PREVENTATIVE:  - magnesium, B2, vitamin-D  - Claritin  - lisinopril  - Wellbutrin, Zoloft, haloperidol, lithium, Latuda, Cogentin, Risperdal  - Topamax, gabapentin, Lamictal, diamox  ABORTIVE:  - Toradol, ibuprofen, diclofenac  - hydrocodone  - Ativan     Neck pain   When did the neck pain start? Has worsened over the past few months  Does your neck pain cause you to have headaches? Possibly          Sleep Habit  Is your sleep restful? no  What time do you go to bed at night? Varies  What time do you wake up in am? 5 to 5:30  How often do you get up at night? 2-3 times  Do you wake up with headaches? Can   Do you snore while asleep? no  Have you been told that you stop breathing during sleeping? no  Do you wake up tired in the morning? yes  Do you take frequent naps during the day? sometimes  Do you have jaw pain? no  Do you grind/clench your teeth at night? yes  Do you have restless leg syndrome? yes  Do you have nightmare or sleep walk? Not usually     Alternative therapies used in the past for headaches? no other headache interventions have been tried  Have you used CBD or THC for your headaches and how often? No  Are you current pregnant or planning on getting pregnant? No  Have you ever had any Brain imaging? 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 - pt did see ENT - no concerns re this finding  The following portions of the patient's history were reviewed and updated as appropriate: allergies, current medications, past family history, past medical history, past social history, past surgical history and problem list          Objective:    Blood pressure 143/80, pulse 82, temperature 97 6 °F (36 4 °C), height 5' 8" (1 727 m), weight (!) 193 kg (425 lb 6 4 oz)  Physical Exam  Vitals signs reviewed  Eyes:      General: Lids are normal       Extraocular Movements: Extraocular movements intact  Pupils: Pupils are equal, round, and reactive to light  Neurological:      Coordination: Coordination is intact  Deep Tendon Reflexes: Strength normal and reflexes are normal and symmetric  Psychiatric:         Speech: Speech normal          Neurological Exam  Mental Status   Oriented to person, place, time and situation  Speech is normal  Language is fluent with no aphasia  Fund of knowledge is appropriate for level of education  Apraxia absent      Cranial Nerves  CN II: Visual acuity is normal  Visual fields full to confrontation  CN III, IV, VI: Extraocular movements intact bilaterally  Normal lids and orbits bilaterally  Pupils equal round and reactive to light bilaterally  CN V: Facial sensation is normal   CN VII: Full and symmetric facial movement  CN VIII: Hearing is normal   CN IX, X: Palate elevates symmetrically  Normal gag reflex  CN XI: Shoulder shrug strength is normal   CN XII: Tongue midline without atrophy or fasciculations  Motor   Strength is 5/5 throughout all four extremities  Sensory  Sensation is intact to light touch, pinprick, vibration and proprioception in all four extremities  Reflexes  Deep tendon reflexes are 2+ and symmetric in all four extremities with downgoing toes bilaterally  Coordination  Finger-to-nose, rapid alternating movements and heel-to-shin normal bilaterally without dysmetria  Gait  Normal casual, toe, heel and tandem gait  ROS:    Review of Systems   Constitutional: Negative  Negative for appetite change and fever  HENT: Negative  Negative for hearing loss, tinnitus, trouble swallowing and voice change  Dry mouth   Eyes: Positive for photophobia and visual disturbance  Negative for pain  Respiratory: Positive for shortness of breath  Cardiovascular: Negative  Negative for palpitations  Gastrointestinal: Negative  Negative for nausea and vomiting  Endocrine: Negative  Negative for cold intolerance  Genitourinary: Negative  Negative for dysuria, frequency and urgency  Musculoskeletal: Negative  Negative for myalgias and neck pain  Skin: Negative  Negative for rash  Neurological: Positive for headaches  Negative for dizziness, tremors, seizures, syncope, facial asymmetry, speech difficulty, weakness, light-headedness and numbness  Hematological: Negative  Does not bruise/bleed easily  Psychiatric/Behavioral: Positive for sleep disturbance (Can sleep for 5-7 days   Went to the ER due to not eating or drinking)  Negative for confusion and hallucinations  Review of systems personally reviewed

## 2020-09-01 NOTE — TELEPHONE ENCOUNTER
----- Message from Diamond Patterson sent at 9/1/2020 12:27 PM EDT -----  Bhavik Reeves,    I would authorize this under Dr Noreen Brothers she might have better availability than Dr Rene Sapp  Thanks    Liz Sanchez   ----- Message -----  From: Denae Boyd PA-C  Sent: 9/1/2020  12:25 PM EDT  To: Diamond Patterson    Hi,  This pt would be a new start Botox  She would also need an appt with the next available doctor for initial Botox injections  Thanks      Pauline Clements

## 2020-09-01 NOTE — ASSESSMENT & PLAN NOTE
· Pt is having greater than 15 debilitating migraines per month  · These migraines last > 4 hours at a time  · She has tried and failed multiple preventative medications  · Authorization for Botox will be obtained from her insurance company  · She will be scheduled with the next available physician for Botox injections  I have spent 45 minutes with Patient and family today in which greater than 50% of this time was spent in counseling/coordination of care regarding Diagnostic results, Prognosis, Risks and benefits of tx options, Intructions for management, Patient and family education, Importance of tx compliance, Risk factor reductions and Impressions  She will follow-up in 3 months

## 2020-09-01 NOTE — PATIENT INSTRUCTIONS
Chronic migraine without aura without status migrainosus, not intractable  · Pt is having greater than 15 debilitating migraines per month  · These migraines last > 4 hours at a time  · She has tried and failed multiple preventative medications  · Authorization for Botox will be obtained from her insurance company  · She will be scheduled with the next available physician for Botox injections  I have spent 45 minutes with Patient and family today in which greater than 50% of this time was spent in counseling/coordination of care regarding Diagnostic results, Prognosis, Risks and benefits of tx options, Intructions for management, Patient and family education, Importance of tx compliance, Risk factor reductions and Impressions  She will follow-up in 3 months  Idiopathic intracranial hypertension  · Pt did not tolerate Diamox  · A trial of furosemide will be started  · Her renal function is normal   · She will take furosemide 20mg daily  · Side effects were reviewed  · She will call in 1-2 weeks with an update

## 2020-09-01 NOTE — ASSESSMENT & PLAN NOTE
· Pt did not tolerate Diamox  · A trial of furosemide will be started  · Her renal function is normal   · She will take furosemide 20mg daily  · Side effects were reviewed  · She will call in 1-2 weeks with an update

## 2020-09-02 NOTE — TELEPHONE ENCOUNTER
I [facility] - she enquired if patient would be using Botox on combination with a CGRP  I informed her of my previous conversation with the patient  And, also informed her that I [facility]ed [facility] as per Tasia's request  She informed me that the request has been sent for review and we will be contacted with a determination

## 2020-09-02 NOTE — TELEPHONE ENCOUNTER
Kwan Hirsch from [facility] called to state additional information is needed for the botox auth  She states she faxed us the information that is needed  She asks that we return ASAP

## 2020-09-02 NOTE — TELEPHONE ENCOUNTER
[employer] and spoke to Jagdish Wu - she informed me that they are looking for documentation if Botox will be used in conjunction with a CGRP    she requested to have the information faxed to  with [facility] ID- [id number]    I called and spoke to patient - she stated that she has never been on a CGRP, due to contraindication she she has chronic constipation

## 2020-09-02 NOTE — TELEPHONE ENCOUNTER
Fax sent to [facility] with above information

## 2020-09-04 ENCOUNTER — TELEPHONE (OUTPATIENT)
Dept: NEUROLOGY | Facility: CLINIC | Age: 35
End: 2020-09-04

## 2020-09-04 NOTE — TELEPHONE ENCOUNTER
General  09/04/2020  3:08 PM  Devi Meléndez MA  CARE COORDINATION  -    Note     NEW START BOTOX 200 UNITS  - EOC# [id number], AV- 09/02/2020 TILL 12/31/2020    [facility]

## 2020-09-04 NOTE — TELEPHONE ENCOUNTER
Good morning,     Please schedule new start Botox with either Dr Nura Patterson or Dr Keira Heller at least 3 weeks out as this will be specialty pharmacy  Please let me know once the apt is scheduled so I can attach the referral     Approval letter received via fax: EOC# [id number], AV- 09/02/2020 TILL 12/31/2020    Thank you!     Amy

## 2020-09-08 NOTE — TELEPHONE ENCOUNTER
Left message for patient to rs appt 9/24 with Mike Hernandez   Scanned letter in to chart and mailed to patient

## 2020-09-08 NOTE — TELEPHONE ENCOUNTER
I received a note from [facility]  To call [facility] to schedule Botox delivery  I called [facility] and spoke to Lolis - Botox delivery scheduled for Thursday 9/17/20 via UPS priority signature required to OS location [address]  Please await Botox delivery  Thank you!     Amy

## 2020-09-08 NOTE — TELEPHONE ENCOUNTER
Called [facility] and spoke to [employer] - I informed her that I needed to call in a new Botox prescription for the patient  I was transferred to the pharmacy where I spoke to Danny Rubin - provided her with verbal order for Botox 200 Units vial Qty of 1 with 3 refills

## 2020-09-12 DIAGNOSIS — E03.9 HYPOTHYROIDISM, UNSPECIFIED TYPE: ICD-10-CM

## 2020-09-14 RX ORDER — LEVOTHYROXINE SODIUM 0.12 MG/1
TABLET ORAL
Qty: 90 TABLET | Refills: 0 | Status: SHIPPED | OUTPATIENT
Start: 2020-09-14 | End: 2020-12-21

## 2020-09-16 ENCOUNTER — OFFICE VISIT (OUTPATIENT)
Dept: FAMILY MEDICINE CLINIC | Facility: CLINIC | Age: 35
End: 2020-09-16
Payer: COMMERCIAL

## 2020-09-16 ENCOUNTER — TELEPHONE (OUTPATIENT)
Dept: NEUROLOGY | Facility: CLINIC | Age: 35
End: 2020-09-16

## 2020-09-16 VITALS
DIASTOLIC BLOOD PRESSURE: 98 MMHG | WEIGHT: 293 LBS | OXYGEN SATURATION: 98 % | TEMPERATURE: 97.5 F | SYSTOLIC BLOOD PRESSURE: 158 MMHG | BODY MASS INDEX: 44.41 KG/M2 | HEIGHT: 68 IN | HEART RATE: 75 BPM

## 2020-09-16 DIAGNOSIS — Z23 NEED FOR INFLUENZA VACCINATION: ICD-10-CM

## 2020-09-16 DIAGNOSIS — K59.09 CHRONIC CONSTIPATION: Primary | ICD-10-CM

## 2020-09-16 PROCEDURE — 99213 OFFICE O/P EST LOW 20 MIN: CPT | Performed by: PHYSICIAN ASSISTANT

## 2020-09-16 PROCEDURE — G0008 ADMIN INFLUENZA VIRUS VAC: HCPCS | Performed by: PHYSICIAN ASSISTANT

## 2020-09-16 PROCEDURE — 90686 IIV4 VACC NO PRSV 0.5 ML IM: CPT | Performed by: PHYSICIAN ASSISTANT

## 2020-09-16 RX ORDER — ONABOTULINUMTOXINA 200 [USP'U]/1
INJECTION, POWDER, LYOPHILIZED, FOR SOLUTION INTRADERMAL; INTRAMUSCULAR
COMMUNITY
Start: 2020-09-08 | End: 2020-11-12 | Stop reason: SDUPTHER

## 2020-09-16 NOTE — TELEPHONE ENCOUNTER
FYI-pt called to give an update on how she was doing    states that 2 days after she started lasix, the headache pretty much went away an has not had any major headaches since then     still taking lasix 20mg dialy  no need to call back unless other recommendations  [phone] to leave a detailed message

## 2020-09-17 NOTE — TELEPHONE ENCOUNTER
Botox received and logged at [address] number of units: 200 units  Botox quantity: 1  Arrived at what location: [facility]  Lot number: [id number]  Expiration Date: 05/2023

## 2020-09-17 NOTE — PROGRESS NOTES
Assessment/Plan:    Problem List Items Addressed This Visit     None      Visit Diagnoses     Chronic constipation    -  Primary    Relevant Orders    Ambulatory referral to Gastroenterology    Need for influenza vaccination        Relevant Orders    influenza vaccine, quadrivalent, 0 5 mL, preservative-free, for adult and pediatric patients 6 mos+ (AFLURIA, FLUARIX, FLULAVAL, FLUZONE) (Completed)           Diagnoses and all orders for this visit:    Chronic constipation  -     Ambulatory referral to Gastroenterology; Future    Need for influenza vaccination  -     influenza vaccine, quadrivalent, 0 5 mL, preservative-free, for adult and pediatric patients 6 mos+ (AFLURIA, FLUARIX, FLULAVAL, FLUZONE)    Other orders  -     Botox 200 units SOLR        Encouraged patient to continue Miralax as needed to help with constipation  I believe that her symptoms are a side effects of her medications  I advised her to discuss with her neurologist and psychiatrist what medications she can be taken off of  She was agreeable  I encouraged her to be more active  I did place a referral to GI as well  Subjective:      Patient ID: Meri Wong is a 38 y o  female  Amarjit Osuna is a 38year old female who is here today complaining of chronic constipation, and would like to see GI  This has been going on for about the last year  She noticed that it worsened after starting the adderall and Anafranil  However, the Anafranil is what is providing the most improvement of her OCD and anxiety  She has tried many OTC medications for constipation  She admits that she takes a double dose of Miralax daily, but it does not always keep her regular  When she does develop constipation, she will get some pressure in her LLQ  She admits that she does have a history of diverticulitis  Her last episode was in 2015  Her last bowel movement was today  She denies any current abdominal pain   She denies any black stools, tarry stools, or blood in her stool  She has also been on multiple different medications for migraine headaches  She is supposed to start Botox at her next follow-up to see if this provides more improvement  The following portions of the patient's history were reviewed and updated as appropriate:   She has a past medical history of Anorexia nervosa in remission, Anxiety, Back pain, [facility] ([facility] ), Depression, Esophageal reflux (8/15/2013), Hypertension, Hypothyroid, Idiopathic intracranial hypertension, Lumbar degenerative disc disease (5/8/2014), Migraine, Obesity, Obsessive-compulsive disorder, Overactive bladder, Psychiatric disorder, Restless leg syndrome, controlled, Seasonal allergies, and Suicide and self-inflicted injury ([facility] )  ,  does not have any pertinent problems on file  ,   has a past surgical history that includes Tonsillectomy; Sinus endoscopy; Dental surgery; and IR lumbar puncture (2/26/2019)  ,  family history includes Arthritis in her family; Breast cancer in her family and maternal grandmother; Cancer in her maternal grandfather, maternal grandmother, and paternal grandmother; Depression in her mother; Diabetes in her mother; Heart disease in her maternal grandmother; Hypertension in her brother, family, father, and mother; Hypothyroidism in her father; Mental illness in her father; Osteopenia in her family; Osteoporosis in her family; Other in her mother; Pneumonia in her maternal grandfather and paternal grandfather; Skin cancer in her maternal grandmother; Stroke in her maternal grandmother; Suicide Attempts in her mother; Thyroid disease in her father; Transient ischemic attack in her family  ,   reports that she has never smoked  She has never used smokeless tobacco  She reports previous alcohol use  She reports that she does not use drugs  ,  is allergic to doxycycline and other     Current Outpatient Medications   Medication Sig Dispense Refill    amphetamine-dextroamphetamine (ADDERALL) 20 mg tablet Take 20 mg by mouth 2 (two) times a day       Botox 200 units SOLR       buPROPion (WELLBUTRIN XL) 300 mg 24 hr tablet Take 300 mg by mouth daily       Cholecalciferol (VITAMIN D3) 5000 units CAPS Take 5,000 Units by mouth daily      clomiPRAMINE (ANAFRANIL) 50 mg capsule Take 100 mg by mouth 2 (two) times a day      fexofenadine (ALLEGRA) 180 MG tablet Take 180 mg by mouth daily      furosemide (LASIX) 20 mg tablet Take 1 tablet (20 mg total) by mouth daily 30 tablet 3    gabapentin (NEURONTIN) 600 MG tablet TAKE 1/2 TABLET DAILY IN AM AND 1 AND 1/2 TABLET AT BEDTIME      HYDROcodone-acetaminophen (NORCO) 5-325 mg per tablet Take 1 tablet by mouth 2 (two) times a day as needed for pain (severe pain)      levothyroxine 125 mcg tablet TAKE 1 TABLET BY MOUTH EVERY DAY IN THE MORNING 90 tablet 0    LORazepam (ATIVAN) 1 mg tablet Take 1 mg by mouth 3 (three) times a day as needed for anxiety      MAGNESIUM PO Take 1 tablet by mouth daily      Multiple Vitamin (MULTIVITAMIN) capsule Take 1 capsule by mouth daily      omeprazole (PriLOSEC) 20 mg delayed release capsule Take 1 capsule (20 mg total) by mouth 2 (two) times a day 180 capsule 1    prochlorperazine (COMPAZINE) 10 mg tablet 1 tab at onset of migraine, can repeat in 8 hours, can take with triptan/NSAID 20 tablet 3    RESTASIS 0 05 % ophthalmic emulsion PLACE 1 DROP INTO BOTH EYES TWICE A DAY  3    rOPINIRole (REQUIP) 4 mg tablet TAKE 1 TABLET BY MOUTH TWICE A DAY 60 tablet 5    sertraline (ZOLOFT) 100 mg tablet Take 200 mg by mouth daily  0    topiramate (TOPAMAX) 100 mg tablet Take 100mg in the morning and 200mg in the evening by mouth daily   90 tablet 2    VRAYLAR 4 5 MG capsule Take 4 5 mg by mouth daily      ketorolac (TORADOL) 10 mg tablet Take 1 tablet (10 mg total) by mouth every 6 (six) hours as needed for moderate pain (migraine headaches) (Patient not taking: Reported on 9/16/2020) 20 tablet 0    loratadine (CLARITIN) 10 mg tablet Take 1 tablet (10 mg total) by mouth daily (Patient not taking: Reported on 9/1/2020) 90 tablet 3    magnesium citrate (CITROMA) 1 745 g/30 mL oral solution Take 296 mL by mouth once for 1 dose (Patient not taking: Reported on 9/1/2020) 296 mL 1    meloxicam (MOBIC) 7 5 mg tablet TAKE 1 AFTER BREAKFAST AND DINNER DAILY AS NEEDED FOR PAIN AND SCIATICA  (Patient not taking: Reported on 9/16/2020) 60 tablet 0     No current facility-administered medications for this visit  Review of Systems   Constitutional: Negative for chills, diaphoresis, fatigue and fever  HENT: Negative for congestion, ear pain, postnasal drip, rhinorrhea, sneezing, sore throat and trouble swallowing  Eyes: Negative for pain and visual disturbance  Respiratory: Negative for apnea, cough, shortness of breath and wheezing  Cardiovascular: Negative for chest pain and palpitations  Gastrointestinal: Positive for constipation  Negative for abdominal pain, diarrhea, nausea and vomiting  Genitourinary: Negative for dysuria and hematuria  Musculoskeletal: Positive for back pain  Negative for arthralgias, gait problem and myalgias  Neurological: Positive for headaches  Negative for dizziness, syncope, weakness, light-headedness and numbness  Psychiatric/Behavioral: Negative for suicidal ideas  The patient is nervous/anxious  Objective:  Vitals:    09/16/20 1310   BP: 158/98   Pulse: 75   Temp: 97 5 °F (36 4 °C)   SpO2: 98%   Weight: (!) 195 kg (430 lb 12 8 oz)   Height: 5' 8" (1 727 m)     Body mass index is 65 5 kg/m²  Physical Exam  Vitals signs and nursing note reviewed  Constitutional:       Appearance: She is well-developed  She is obese  HENT:      Head: Normocephalic and atraumatic  Right Ear: External ear normal       Left Ear: External ear normal    Eyes:      Extraocular Movements: Extraocular movements intact  Neck:      Musculoskeletal: Normal range of motion and neck supple     Cardiovascular: Rate and Rhythm: Normal rate and regular rhythm  Heart sounds: Normal heart sounds  No murmur  No friction rub  No gallop  Pulmonary:      Effort: Pulmonary effort is normal  No respiratory distress  Breath sounds: Normal breath sounds  No wheezing or rales  Abdominal:      General: Bowel sounds are normal       Palpations: Abdomen is soft  Tenderness: There is no abdominal tenderness  There is no guarding  Negative signs include Pino's sign, Rovsing's sign and McBurney's sign  Musculoskeletal: Normal range of motion  Lymphadenopathy:      Cervical: No cervical adenopathy  Skin:     General: Skin is warm and dry  Neurological:      Mental Status: She is alert and oriented to person, place, and time  Psychiatric:         Mood and Affect: Mood is anxious  Speech: Speech is rapid and pressured and tangential          Behavior: Behavior normal          Thought Content:  Thought content normal          Judgment: Judgment normal       Comments: Poor insight

## 2020-09-22 ENCOUNTER — TELEPHONE (OUTPATIENT)
Dept: NEUROLOGY | Facility: CLINIC | Age: 35
End: 2020-09-22

## 2020-09-22 NOTE — TELEPHONE ENCOUNTER
Addy Adorno! Pt is scheduled for [facility] apt with Dr Therese Swan on Friday 9/25/20  Are we rescheduling this apt? Thank you!     mAy

## 2020-09-23 NOTE — TELEPHONE ENCOUNTER
Called patient LVM for patient to call back to reschedule Botox apt due to provider is out of office

## 2020-09-25 ENCOUNTER — TELEPHONE (OUTPATIENT)
Dept: NEUROLOGY | Facility: CLINIC | Age: 35
End: 2020-09-25

## 2020-09-25 NOTE — TELEPHONE ENCOUNTER
Called isabella at 10am to have Botox taken to [place] office advised that it need to there for Monday  I called back at 140pm to check the status of  and he stated that they would not be able to make it today 9/25/20 but he will have them  on Monday morning 9/28/20 around 8-9am  I did advise him appointment is at 1030 so we need it there before that

## 2020-09-28 ENCOUNTER — PROCEDURE VISIT (OUTPATIENT)
Dept: NEUROLOGY | Facility: CLINIC | Age: 35
End: 2020-09-28
Payer: COMMERCIAL

## 2020-09-28 VITALS — TEMPERATURE: 97.2 F | DIASTOLIC BLOOD PRESSURE: 76 MMHG | HEART RATE: 77 BPM | SYSTOLIC BLOOD PRESSURE: 132 MMHG

## 2020-09-28 DIAGNOSIS — G43.709 CHRONIC MIGRAINE WITHOUT AURA WITHOUT STATUS MIGRAINOSUS, NOT INTRACTABLE: Primary | ICD-10-CM

## 2020-09-28 PROCEDURE — 64615 CHEMODENERV MUSC MIGRAINE: CPT | Performed by: PSYCHIATRY & NEUROLOGY

## 2020-09-28 NOTE — PROGRESS NOTES
Chemodenervation    Date/Time: 9/28/2020 10:43 AM  Performed by: Saba Russell DO  Authorized by: Saba Russell DO     Pre-procedure details:     Prepped With: Alcohol    Anesthesia (see MAR for exact dosages): Anesthesia method:  None  Procedure details:     Position:  Supine and upright  Botox:     Botox Type:  Type A    Brand:  Botox    mL's of Botulinum Toxin:  155    mL's of preservative free sterile saline:  2    Final Concentration per CC:  100 units    Needle Gauge:  30 G 2 5 inch  Procedures:     Botox Procedures: chronic headache      Indications: migraines    Injection Location:     Head / Face:  L superior cervical paraspinal, R superior cervical paraspinal, L , R , R frontalis, L frontalis, L lateral occipitalis, R lateral occipitalis, procerus, R temporalis, L temporalis, L superior trapezius and R superior trapezius    L  injection amount:  5 unit(s)    R  injection amount:  5 unit(s)    L lateral frontalis:  5 unit(s)    R lateral frontalis:  5 unit(s)    L medial frontalis:  5 unit(s)    R medial frontalis:  5 unit(s)    L temporalis injection amount:  20 unit(s)    R temporalis injection amount:  20 unit(s)    Procerus injection amount:  5 unit(s)    L lateral occipitalis injection amount:  15 unit(s)    R lateral occipitalis injection amount:  15 unit(s)    L superior cervical paraspinal injection amount:  10 unit(s)    R superior cervical paraspinal injection amount:  10 unit(s)    L superior trapezius injection amount:  15 unit(s)    R superior trapezius injection amount:  15 unit(s)  Total Units:     Total units used:  155    Total units discarded:  45  Post-procedure details:     Chemodenervation:  Chronic migraine    Facial Nerve Location[de-identified]  Bilateral facial nerve    Patient tolerance of procedure:   Tolerated well, no immediate complications

## 2020-10-02 ENCOUNTER — TELEPHONE (OUTPATIENT)
Dept: NEUROLOGY | Facility: CLINIC | Age: 35
End: 2020-10-02

## 2020-10-12 ENCOUNTER — TELEPHONE (OUTPATIENT)
Dept: NEUROLOGY | Facility: CLINIC | Age: 35
End: 2020-10-12

## 2020-10-12 DIAGNOSIS — G93.2 IDIOPATHIC INTRACRANIAL HYPERTENSION: Primary | ICD-10-CM

## 2020-10-13 ENCOUNTER — LAB (OUTPATIENT)
Dept: LAB | Facility: MEDICAL CENTER | Age: 35
End: 2020-10-13
Payer: COMMERCIAL

## 2020-10-13 DIAGNOSIS — G93.2 IDIOPATHIC INTRACRANIAL HYPERTENSION: ICD-10-CM

## 2020-10-13 DIAGNOSIS — G43.719 INTRACTABLE CHRONIC MIGRAINE WITHOUT AURA AND WITHOUT STATUS MIGRAINOSUS: ICD-10-CM

## 2020-10-13 LAB
ANION GAP SERPL CALCULATED.3IONS-SCNC: 3 MMOL/L (ref 4–13)
BUN SERPL-MCNC: 16 MG/DL (ref 5–25)
CALCIUM SERPL-MCNC: 8.7 MG/DL (ref 8.3–10.1)
CHLORIDE SERPL-SCNC: 108 MMOL/L (ref 100–108)
CO2 SERPL-SCNC: 26 MMOL/L (ref 21–32)
CREAT SERPL-MCNC: 0.76 MG/DL (ref 0.6–1.3)
GFR SERPL CREATININE-BSD FRML MDRD: 101 ML/MIN/1.73SQ M
GLUCOSE P FAST SERPL-MCNC: 97 MG/DL (ref 65–99)
POTASSIUM SERPL-SCNC: 4.1 MMOL/L (ref 3.5–5.3)
SODIUM SERPL-SCNC: 137 MMOL/L (ref 136–145)

## 2020-10-13 PROCEDURE — 80048 BASIC METABOLIC PNL TOTAL CA: CPT

## 2020-10-13 PROCEDURE — 36415 COLL VENOUS BLD VENIPUNCTURE: CPT

## 2020-10-13 RX ORDER — TOPIRAMATE 100 MG/1
TABLET, FILM COATED ORAL
Qty: 90 TABLET | Refills: 2 | Status: SHIPPED | OUTPATIENT
Start: 2020-10-13 | End: 2021-01-06

## 2020-10-14 ENCOUNTER — TELEPHONE (OUTPATIENT)
Dept: NEUROLOGY | Facility: CLINIC | Age: 35
End: 2020-10-14

## 2020-10-14 DIAGNOSIS — G93.2 IDIOPATHIC INTRACRANIAL HYPERTENSION: ICD-10-CM

## 2020-10-14 RX ORDER — FUROSEMIDE 40 MG/1
40 TABLET ORAL DAILY
Qty: 30 TABLET | Refills: 3 | Status: SHIPPED | OUTPATIENT
Start: 2020-10-14 | End: 2021-01-06

## 2020-10-19 ENCOUNTER — OFFICE VISIT (OUTPATIENT)
Dept: SLEEP CENTER | Facility: CLINIC | Age: 35
End: 2020-10-19
Payer: COMMERCIAL

## 2020-10-19 ENCOUNTER — TELEPHONE (OUTPATIENT)
Dept: GASTROENTEROLOGY | Facility: CLINIC | Age: 35
End: 2020-10-19

## 2020-10-19 VITALS
TEMPERATURE: 97.4 F | OXYGEN SATURATION: 98 % | WEIGHT: 293 LBS | HEIGHT: 68 IN | HEART RATE: 84 BPM | BODY MASS INDEX: 44.41 KG/M2

## 2020-10-19 DIAGNOSIS — G47.33 OBSTRUCTIVE SLEEP APNEA SYNDROME: ICD-10-CM

## 2020-10-19 DIAGNOSIS — G47.33 OSA (OBSTRUCTIVE SLEEP APNEA): Primary | ICD-10-CM

## 2020-10-19 PROCEDURE — 99204 OFFICE O/P NEW MOD 45 MIN: CPT | Performed by: PSYCHIATRY & NEUROLOGY

## 2020-10-19 PROCEDURE — 1036F TOBACCO NON-USER: CPT | Performed by: PSYCHIATRY & NEUROLOGY

## 2020-10-23 DIAGNOSIS — G93.2 IDIOPATHIC INTRACRANIAL HYPERTENSION: ICD-10-CM

## 2020-10-23 DIAGNOSIS — G25.81 RESTLESS LEG SYNDROME: ICD-10-CM

## 2020-10-23 RX ORDER — ROPINIROLE 4 MG/1
TABLET, FILM COATED ORAL
Qty: 60 TABLET | Refills: 5 | Status: SHIPPED | OUTPATIENT
Start: 2020-10-23 | End: 2021-06-04

## 2020-10-23 RX ORDER — FUROSEMIDE 20 MG/1
40 TABLET ORAL DAILY
Qty: 180 TABLET | Refills: 3 | Status: SHIPPED | OUTPATIENT
Start: 2020-10-23 | End: 2021-01-27 | Stop reason: SDUPTHER

## 2020-10-28 ENCOUNTER — TELEPHONE (OUTPATIENT)
Dept: NEUROLOGY | Facility: CLINIC | Age: 35
End: 2020-10-28

## 2020-10-29 DIAGNOSIS — G43.709 CHRONIC MIGRAINE WITHOUT AURA WITHOUT STATUS MIGRAINOSUS, NOT INTRACTABLE: Primary | ICD-10-CM

## 2020-10-29 RX ORDER — TOPIRAMATE 25 MG/1
TABLET ORAL
Qty: 60 TABLET | Refills: 0 | Status: SHIPPED | OUTPATIENT
Start: 2020-10-29 | End: 2020-11-23

## 2020-10-30 ENCOUNTER — TELEPHONE (OUTPATIENT)
Dept: NEUROLOGY | Facility: CLINIC | Age: 35
End: 2020-10-30

## 2020-11-04 ENCOUNTER — TELEPHONE (OUTPATIENT)
Dept: NEUROLOGY | Facility: CLINIC | Age: 35
End: 2020-11-04

## 2020-11-11 DIAGNOSIS — G43.709 CHRONIC MIGRAINE WITHOUT AURA WITHOUT STATUS MIGRAINOSUS, NOT INTRACTABLE: Primary | ICD-10-CM

## 2020-11-12 ENCOUNTER — OFFICE VISIT (OUTPATIENT)
Dept: NEUROLOGY | Facility: CLINIC | Age: 35
End: 2020-11-12
Payer: COMMERCIAL

## 2020-11-12 VITALS
HEIGHT: 68 IN | BODY MASS INDEX: 44.41 KG/M2 | SYSTOLIC BLOOD PRESSURE: 138 MMHG | DIASTOLIC BLOOD PRESSURE: 70 MMHG | WEIGHT: 293 LBS | TEMPERATURE: 97.6 F | HEART RATE: 92 BPM

## 2020-11-12 DIAGNOSIS — G43.709 CHRONIC MIGRAINE WITHOUT AURA WITHOUT STATUS MIGRAINOSUS, NOT INTRACTABLE: Primary | ICD-10-CM

## 2020-11-12 DIAGNOSIS — G93.2 IDIOPATHIC INTRACRANIAL HYPERTENSION: ICD-10-CM

## 2020-11-12 PROCEDURE — 3075F SYST BP GE 130 - 139MM HG: CPT | Performed by: PHYSICIAN ASSISTANT

## 2020-11-12 PROCEDURE — 1036F TOBACCO NON-USER: CPT | Performed by: PHYSICIAN ASSISTANT

## 2020-11-12 PROCEDURE — 3008F BODY MASS INDEX DOCD: CPT | Performed by: PHYSICIAN ASSISTANT

## 2020-11-12 PROCEDURE — 99214 OFFICE O/P EST MOD 30 MIN: CPT | Performed by: PHYSICIAN ASSISTANT

## 2020-11-12 PROCEDURE — 3078F DIAST BP <80 MM HG: CPT | Performed by: PHYSICIAN ASSISTANT

## 2020-11-12 RX ORDER — ONABOTULINUMTOXINA 200 [USP'U]/1
INJECTION, POWDER, LYOPHILIZED, FOR SOLUTION INTRADERMAL; INTRAMUSCULAR
COMMUNITY
End: 2022-04-12

## 2020-11-14 DIAGNOSIS — K21.9 GASTROESOPHAGEAL REFLUX DISEASE WITHOUT ESOPHAGITIS: ICD-10-CM

## 2020-11-15 RX ORDER — OMEPRAZOLE 20 MG/1
CAPSULE, DELAYED RELEASE ORAL
Qty: 180 CAPSULE | Refills: 1 | Status: SHIPPED | OUTPATIENT
Start: 2020-11-15 | End: 2021-05-11

## 2020-11-18 ENCOUNTER — TELEPHONE (OUTPATIENT)
Dept: UROLOGY | Facility: MEDICAL CENTER | Age: 35
End: 2020-11-18

## 2020-11-22 DIAGNOSIS — G43.709 CHRONIC MIGRAINE WITHOUT AURA WITHOUT STATUS MIGRAINOSUS, NOT INTRACTABLE: ICD-10-CM

## 2020-11-23 RX ORDER — TOPIRAMATE 25 MG/1
TABLET ORAL
Qty: 60 TABLET | Refills: 0 | Status: SHIPPED | OUTPATIENT
Start: 2020-11-23 | End: 2021-01-06 | Stop reason: SINTOL

## 2020-12-07 ENCOUNTER — CONSULT (OUTPATIENT)
Dept: GASTROENTEROLOGY | Facility: CLINIC | Age: 35
End: 2020-12-07
Payer: COMMERCIAL

## 2020-12-07 ENCOUNTER — TELEPHONE (OUTPATIENT)
Dept: GASTROENTEROLOGY | Facility: CLINIC | Age: 35
End: 2020-12-07

## 2020-12-07 VITALS
HEART RATE: 84 BPM | HEIGHT: 68 IN | DIASTOLIC BLOOD PRESSURE: 93 MMHG | TEMPERATURE: 98.4 F | WEIGHT: 293 LBS | SYSTOLIC BLOOD PRESSURE: 155 MMHG | BODY MASS INDEX: 44.41 KG/M2

## 2020-12-07 DIAGNOSIS — F33.41 RECURRENT MAJOR DEPRESSIVE DISORDER, IN PARTIAL REMISSION (HCC): Primary | ICD-10-CM

## 2020-12-07 DIAGNOSIS — E66.9 OBESITY WITHOUT SERIOUS COMORBIDITY, UNSPECIFIED CLASSIFICATION, UNSPECIFIED OBESITY TYPE: ICD-10-CM

## 2020-12-07 DIAGNOSIS — K21.9 GASTROESOPHAGEAL REFLUX DISEASE, UNSPECIFIED WHETHER ESOPHAGITIS PRESENT: ICD-10-CM

## 2020-12-07 DIAGNOSIS — K59.09 CHRONIC CONSTIPATION: ICD-10-CM

## 2020-12-07 DIAGNOSIS — G47.33 OSA (OBSTRUCTIVE SLEEP APNEA): ICD-10-CM

## 2020-12-07 PROCEDURE — 99213 OFFICE O/P EST LOW 20 MIN: CPT | Performed by: INTERNAL MEDICINE

## 2020-12-07 PROCEDURE — 3077F SYST BP >= 140 MM HG: CPT | Performed by: INTERNAL MEDICINE

## 2020-12-07 PROCEDURE — 1036F TOBACCO NON-USER: CPT | Performed by: INTERNAL MEDICINE

## 2020-12-07 PROCEDURE — 3008F BODY MASS INDEX DOCD: CPT | Performed by: INTERNAL MEDICINE

## 2020-12-07 PROCEDURE — 3080F DIAST BP >= 90 MM HG: CPT | Performed by: INTERNAL MEDICINE

## 2020-12-07 RX ORDER — LINACLOTIDE 290 UG/1
290 CAPSULE, GELATIN COATED ORAL DAILY
Qty: 30 CAPSULE | Refills: 5 | Status: SHIPPED | OUTPATIENT
Start: 2020-12-07 | End: 2021-06-03

## 2020-12-07 NOTE — TELEPHONE ENCOUNTER
Contact patient when Dr Birgit Colunga procedure schedule opens for February for Colonoscopy  Schedule follow up for after

## 2020-12-18 DIAGNOSIS — G43.709 CHRONIC MIGRAINE WITHOUT AURA WITHOUT STATUS MIGRAINOSUS, NOT INTRACTABLE: ICD-10-CM

## 2020-12-18 RX ORDER — TOPIRAMATE 25 MG/1
TABLET ORAL
Qty: 42 TABLET | Refills: 1 | OUTPATIENT
Start: 2020-12-18

## 2020-12-18 NOTE — TELEPHONE ENCOUNTER
Called pt and advised of the below  Pt states that she is weaning off of this med and she does not need a refill  "Tell her my pharmacy just wants to make money"

## 2020-12-18 NOTE — TELEPHONE ENCOUNTER
At her last appt pt was weaning off topiramate  Can you call and confirm she is still doing so? Thanks

## 2020-12-20 DIAGNOSIS — E03.9 HYPOTHYROIDISM, UNSPECIFIED TYPE: ICD-10-CM

## 2020-12-21 RX ORDER — LEVOTHYROXINE SODIUM 0.12 MG/1
TABLET ORAL
Qty: 90 TABLET | Refills: 0 | Status: SHIPPED | OUTPATIENT
Start: 2020-12-21 | End: 2021-03-15

## 2020-12-22 ENCOUNTER — TELEPHONE (OUTPATIENT)
Dept: NEUROLOGY | Facility: CLINIC | Age: 35
End: 2020-12-22

## 2020-12-22 ENCOUNTER — OFFICE VISIT (OUTPATIENT)
Dept: FAMILY MEDICINE CLINIC | Facility: CLINIC | Age: 35
End: 2020-12-22
Payer: COMMERCIAL

## 2020-12-22 VITALS
DIASTOLIC BLOOD PRESSURE: 78 MMHG | BODY MASS INDEX: 44.41 KG/M2 | SYSTOLIC BLOOD PRESSURE: 128 MMHG | HEIGHT: 68 IN | OXYGEN SATURATION: 98 % | HEART RATE: 94 BPM | TEMPERATURE: 98.2 F | WEIGHT: 293 LBS

## 2020-12-22 DIAGNOSIS — R73.01 ELEVATED FASTING GLUCOSE: ICD-10-CM

## 2020-12-22 DIAGNOSIS — E03.9 ACQUIRED HYPOTHYROIDISM: ICD-10-CM

## 2020-12-22 DIAGNOSIS — Z00.00 MEDICARE ANNUAL WELLNESS VISIT, SUBSEQUENT: Primary | ICD-10-CM

## 2020-12-22 DIAGNOSIS — Z13.220 SCREENING FOR HYPERLIPIDEMIA: ICD-10-CM

## 2020-12-22 PROCEDURE — 1036F TOBACCO NON-USER: CPT | Performed by: PHYSICIAN ASSISTANT

## 2020-12-22 PROCEDURE — 3078F DIAST BP <80 MM HG: CPT | Performed by: PHYSICIAN ASSISTANT

## 2020-12-22 PROCEDURE — 3074F SYST BP LT 130 MM HG: CPT | Performed by: PHYSICIAN ASSISTANT

## 2020-12-22 PROCEDURE — G0439 PPPS, SUBSEQ VISIT: HCPCS | Performed by: PHYSICIAN ASSISTANT

## 2020-12-22 PROCEDURE — 3725F SCREEN DEPRESSION PERFORMED: CPT | Performed by: PHYSICIAN ASSISTANT

## 2020-12-22 PROCEDURE — 3008F BODY MASS INDEX DOCD: CPT | Performed by: PHYSICIAN ASSISTANT

## 2020-12-29 ENCOUNTER — TELEPHONE (OUTPATIENT)
Dept: GASTROENTEROLOGY | Facility: CLINIC | Age: 35
End: 2020-12-29

## 2020-12-29 NOTE — TELEPHONE ENCOUNTER
Called patient to let the patient know that Dr Hwang's schedule is not open as of today and wanted to see if she wanted to be scheduled with a different Dr  I did LMOM to call the office back

## 2020-12-30 ENCOUNTER — PROCEDURE VISIT (OUTPATIENT)
Dept: NEUROLOGY | Facility: CLINIC | Age: 35
End: 2020-12-30
Payer: COMMERCIAL

## 2020-12-30 ENCOUNTER — PREP FOR PROCEDURE (OUTPATIENT)
Dept: SURGERY | Facility: CLINIC | Age: 35
End: 2020-12-30

## 2020-12-30 ENCOUNTER — TELEPHONE (OUTPATIENT)
Dept: GASTROENTEROLOGY | Facility: CLINIC | Age: 35
End: 2020-12-30

## 2020-12-30 VITALS — SYSTOLIC BLOOD PRESSURE: 132 MMHG | DIASTOLIC BLOOD PRESSURE: 78 MMHG | TEMPERATURE: 97.9 F

## 2020-12-30 DIAGNOSIS — K21.9 GASTROESOPHAGEAL REFLUX DISEASE WITHOUT ESOPHAGITIS: Primary | ICD-10-CM

## 2020-12-30 DIAGNOSIS — G43.709 CHRONIC MIGRAINE WITHOUT AURA WITHOUT STATUS MIGRAINOSUS, NOT INTRACTABLE: Primary | ICD-10-CM

## 2020-12-30 PROCEDURE — 64615 CHEMODENERV MUSC MIGRAINE: CPT | Performed by: PHYSICIAN ASSISTANT

## 2021-01-04 ENCOUNTER — HOSPITAL ENCOUNTER (EMERGENCY)
Facility: HOSPITAL | Age: 36
Discharge: HOME | End: 2021-01-04
Attending: EMERGENCY MEDICINE | Admitting: EMERGENCY MEDICINE
Payer: COMMERCIAL

## 2021-01-04 ENCOUNTER — APPOINTMENT (EMERGENCY)
Dept: CT IMAGING | Facility: HOSPITAL | Age: 36
End: 2021-01-04
Payer: COMMERCIAL

## 2021-01-04 VITALS
RESPIRATION RATE: 20 BRPM | SYSTOLIC BLOOD PRESSURE: 146 MMHG | OXYGEN SATURATION: 97 % | WEIGHT: 293 LBS | BODY MASS INDEX: 44.41 KG/M2 | HEIGHT: 68 IN | DIASTOLIC BLOOD PRESSURE: 85 MMHG | HEART RATE: 82 BPM | TEMPERATURE: 97.8 F

## 2021-01-04 DIAGNOSIS — R51.9 HEADACHE: Primary | ICD-10-CM

## 2021-01-04 DIAGNOSIS — G43.909 MIGRAINE: ICD-10-CM

## 2021-01-04 LAB
ALBUMIN SERPL BCP-MCNC: 3.5 G/DL (ref 3.5–5)
ALP SERPL-CCNC: 107 U/L (ref 46–116)
ALT SERPL W P-5'-P-CCNC: 31 U/L (ref 12–78)
ANION GAP SERPL CALCULATED.3IONS-SCNC: 10 MMOL/L (ref 4–13)
AST SERPL W P-5'-P-CCNC: 25 U/L (ref 5–45)
BASOPHILS # BLD AUTO: 0.1 THOUSANDS/ΜL (ref 0–0.1)
BASOPHILS NFR BLD AUTO: 1 % (ref 0–1)
BILIRUB SERPL-MCNC: 0.3 MG/DL (ref 0.2–1)
BUN SERPL-MCNC: 13 MG/DL (ref 5–25)
CALCIUM SERPL-MCNC: 9.2 MG/DL (ref 8.3–10.1)
CHLORIDE SERPL-SCNC: 102 MMOL/L (ref 100–108)
CO2 SERPL-SCNC: 25 MMOL/L (ref 21–32)
CREAT SERPL-MCNC: 0.78 MG/DL (ref 0.6–1.3)
EOSINOPHIL # BLD AUTO: 0.26 THOUSAND/ΜL (ref 0–0.61)
EOSINOPHIL NFR BLD AUTO: 3 % (ref 0–6)
ERYTHROCYTE [DISTWIDTH] IN BLOOD BY AUTOMATED COUNT: 13.9 % (ref 11.6–15.1)
GFR SERPL CREATININE-BSD FRML MDRD: 98 ML/MIN/1.73SQ M
GLUCOSE SERPL-MCNC: 90 MG/DL (ref 65–140)
HCT VFR BLD AUTO: 45.2 % (ref 34.8–46.1)
HGB BLD-MCNC: 14.1 G/DL (ref 11.5–15.4)
IMM GRANULOCYTES # BLD AUTO: 0.08 THOUSAND/UL (ref 0–0.2)
IMM GRANULOCYTES NFR BLD AUTO: 1 % (ref 0–2)
LYMPHOCYTES # BLD AUTO: 2.78 THOUSANDS/ΜL (ref 0.6–4.47)
LYMPHOCYTES NFR BLD AUTO: 28 % (ref 14–44)
MCH RBC QN AUTO: 28.5 PG (ref 26.8–34.3)
MCHC RBC AUTO-ENTMCNC: 31.2 G/DL (ref 31.4–37.4)
MCV RBC AUTO: 92 FL (ref 82–98)
MONOCYTES # BLD AUTO: 0.54 THOUSAND/ΜL (ref 0.17–1.22)
MONOCYTES NFR BLD AUTO: 5 % (ref 4–12)
NEUTROPHILS # BLD AUTO: 6.21 THOUSANDS/ΜL (ref 1.85–7.62)
NEUTS SEG NFR BLD AUTO: 62 % (ref 43–75)
NRBC BLD AUTO-RTO: 0 /100 WBCS
PLATELET # BLD AUTO: 265 THOUSANDS/UL (ref 149–390)
PMV BLD AUTO: 9.8 FL (ref 8.9–12.7)
POTASSIUM SERPL-SCNC: 4.6 MMOL/L (ref 3.5–5.3)
PROT SERPL-MCNC: 7.5 G/DL (ref 6.4–8.2)
RBC # BLD AUTO: 4.94 MILLION/UL (ref 3.81–5.12)
SODIUM SERPL-SCNC: 137 MMOL/L (ref 136–145)
WBC # BLD AUTO: 9.97 THOUSAND/UL (ref 4.31–10.16)

## 2021-01-04 PROCEDURE — 96365 THER/PROPH/DIAG IV INF INIT: CPT

## 2021-01-04 PROCEDURE — 80053 COMPREHEN METABOLIC PANEL: CPT | Performed by: PHYSICIAN ASSISTANT

## 2021-01-04 PROCEDURE — G1004 CDSM NDSC: HCPCS

## 2021-01-04 PROCEDURE — 99284 EMERGENCY DEPT VISIT MOD MDM: CPT

## 2021-01-04 PROCEDURE — 85025 COMPLETE CBC W/AUTO DIFF WBC: CPT | Performed by: PHYSICIAN ASSISTANT

## 2021-01-04 PROCEDURE — 96375 TX/PRO/DX INJ NEW DRUG ADDON: CPT

## 2021-01-04 PROCEDURE — 99284 EMERGENCY DEPT VISIT MOD MDM: CPT | Performed by: PHYSICIAN ASSISTANT

## 2021-01-04 PROCEDURE — 70450 CT HEAD/BRAIN W/O DYE: CPT

## 2021-01-04 PROCEDURE — 36415 COLL VENOUS BLD VENIPUNCTURE: CPT | Performed by: PHYSICIAN ASSISTANT

## 2021-01-04 RX ORDER — MAGNESIUM SULFATE HEPTAHYDRATE 40 MG/ML
2 INJECTION, SOLUTION INTRAVENOUS ONCE
Status: COMPLETED | OUTPATIENT
Start: 2021-01-04 | End: 2021-01-04

## 2021-01-04 RX ORDER — DIPHENHYDRAMINE HYDROCHLORIDE 50 MG/ML
25 INJECTION INTRAMUSCULAR; INTRAVENOUS ONCE
Status: COMPLETED | OUTPATIENT
Start: 2021-01-04 | End: 2021-01-04

## 2021-01-04 RX ORDER — METOCLOPRAMIDE HYDROCHLORIDE 5 MG/ML
10 INJECTION INTRAMUSCULAR; INTRAVENOUS ONCE
Status: COMPLETED | OUTPATIENT
Start: 2021-01-04 | End: 2021-01-04

## 2021-01-04 RX ORDER — KETOROLAC TROMETHAMINE 30 MG/ML
15 INJECTION, SOLUTION INTRAMUSCULAR; INTRAVENOUS ONCE
Status: COMPLETED | OUTPATIENT
Start: 2021-01-04 | End: 2021-01-04

## 2021-01-04 RX ADMIN — METOCLOPRAMIDE HYDROCHLORIDE 10 MG: 5 INJECTION INTRAMUSCULAR; INTRAVENOUS at 13:19

## 2021-01-04 RX ADMIN — MAGNESIUM SULFATE IN WATER 2 G: 40 INJECTION, SOLUTION INTRAVENOUS at 13:23

## 2021-01-04 RX ADMIN — KETOROLAC TROMETHAMINE 15 MG: 30 INJECTION, SOLUTION INTRAMUSCULAR; INTRAVENOUS at 13:21

## 2021-01-04 RX ADMIN — DIPHENHYDRAMINE HYDROCHLORIDE 25 MG: 50 INJECTION, SOLUTION INTRAMUSCULAR; INTRAVENOUS at 13:17

## 2021-01-04 RX ADMIN — SODIUM CHLORIDE 1000 ML: 0.9 INJECTION, SOLUTION INTRAVENOUS at 13:14

## 2021-01-04 NOTE — ED PROVIDER NOTES
History  Chief Complaint   Patient presents with    Headache     headache for 4-5 days  HAd botox last wednesday  Patient presents to the emergency department today for evaluation of headache  This 26-year-old female who does not appear to be in any acute distress stating she does have a history of documented chronic migraines as a diagnosis  She has seen Neurology  Had an MRI 2 years ago  States her headache has been ongoing for about 4-5 days  She states she did have a fall out of bed the day prior  She also states the day prior she had scheduled Botox injections  She feels though I am feeling off may be dehydrated     She denies neck stiffness or fever  No history of altered mental status  She was ambulatory into the department  Prior to Admission Medications   Prescriptions Last Dose Informant Patient Reported? Taking?    Botulinum Toxin Type A (Botox) 200 units SOLR  Self Yes Yes   Sig: Inject as directed every 3 (three) months   Cholecalciferol (VITAMIN D3) 5000 units CAPS  Self Yes Yes   Sig: Take 5,000 Units by mouth daily   LORazepam (ATIVAN) 1 mg tablet  Self Yes Yes   Sig: Take 1 mg by mouth 3 (three) times a day as needed for anxiety   MAGNESIUM PO  Self Yes Yes   Sig: Take 1 tablet by mouth daily   Multiple Vitamin (MULTIVITAMIN) capsule  Self Yes Yes   Sig: Take 1 capsule by mouth daily   RESTASIS 0 05 % ophthalmic emulsion  Self Yes Yes   Sig: PLACE 1 DROP INTO BOTH EYES TWICE A DAY   VRAYLAR 4 5 MG capsule  Self Yes Yes   Sig: Take 6 mg by mouth daily    amphetamine-dextroamphetamine (ADDERALL) 20 mg tablet  Self Yes Yes   Sig: Take 20 mg by mouth daily    buPROPion (WELLBUTRIN XL) 300 mg 24 hr tablet  Self Yes Yes   Sig: Take 300 mg by mouth daily    clomiPRAMINE (ANAFRANIL) 50 mg capsule  Self Yes Yes   Sig: Take 100 mg by mouth 2 (two) times a day   fexofenadine (ALLEGRA) 180 MG tablet  Self Yes Yes   Sig: Take 180 mg by mouth daily   furosemide (LASIX) 20 mg tablet  Self No Yes   Sig: Take 2 tablets (40 mg total) by mouth daily   furosemide (LASIX) 40 mg tablet  Self No Yes   Sig: Take 1 tablet (40 mg total) by mouth daily   gabapentin (NEURONTIN) 600 MG tablet  Self Yes Yes   Sig: TAKE 1/2 TABLET DAILY IN AM AND 1 AND 1/2 TABLET AT BEDTIME   ketorolac (TORADOL) 10 mg tablet  Self No Yes   Sig: Take 1 tablet (10 mg total) by mouth every 6 (six) hours as needed for moderate pain (migraine headaches)   levothyroxine 125 mcg tablet   No Yes   Sig: TAKE 1 TABLET BY MOUTH EVERY DAY IN THE MORNING   linaCLOtide (Linzess) 290 MCG CAPS   No Yes   Sig: Take 1 capsule by mouth daily   loratadine (CLARITIN) 10 mg tablet  Self No Yes   Sig: Take 1 tablet (10 mg total) by mouth daily   magnesium citrate (CITROMA) 1 745 g/30 mL oral solution   No No   Sig: Take 296 mL by mouth once for 1 dose   Patient not taking: Reported on 2020   meloxicam (MOBIC) 7 5 mg tablet  Self No Yes   Sig: TAKE 1 AFTER BREAKFAST AND DINNER DAILY AS NEEDED FOR PAIN AND SCIATICA  omeprazole (PriLOSEC) 20 mg delayed release capsule  Self No Yes   Sig: TAKE 1 CAPSULE BY MOUTH TWICE A DAY   prochlorperazine (COMPAZINE) 10 mg tablet  Self No Yes   Si tab at onset of migraine, can repeat in 8 hours, can take with triptan/NSAID   rOPINIRole (REQUIP) 4 mg tablet  Self No Yes   Sig: TAKE 1 TABLET BY MOUTH TWICE A DAY   sertraline (ZOLOFT) 100 mg tablet  Self Yes Yes   Sig: Take 200 mg by mouth daily   topiramate (TOPAMAX) 100 mg tablet  Self No Yes   Sig: TAKE 1 TABLET BY MOUTH IN THE MORNING AND 2 TABLETS IN THE EVENING   Patient taking differently: TAKE 1 TABLET BY MOUTH IN THE MORNING AND 1 TABLET IN THE EVENING   topiramate (TOPAMAX) 25 mg tablet  Self No Yes   Sig: TAKE 75MG AT BEDTIME FOR 1 WEEK, THEN 50MG AT BEDTIME FOR 1 WEEK, THEN 25MG AT BEDTIME FOR 1 WEEK        Facility-Administered Medications: None       Past Medical History:   Diagnosis Date    Anorexia nervosa in remission     Anxiety     Back pain  Bipolar disorder ([facility] [address] )     Depression     Esophageal reflux 8/15/2013    Hypertension     Hypothyroid     Idiopathic intracranial hypertension     Lumbar degenerative disc disease 5/8/2014    Migraine     Obesity     Obsessive-compulsive disorder     Overactive bladder     Psychiatric disorder     Restless leg syndrome, controlled     Seasonal allergies     Suicide and self-inflicted injury [facility])        Past Surgical History:   Procedure Laterality Date    DENTAL SURGERY      IR LUMBAR PUNCTURE  2/26/2019    SINUS ENDOSCOPY      TONSILLECTOMY         Family History   Problem Relation Age of Onset    Depression Mother     Suicide Attempts Mother     Hypertension Mother     Diabetes Mother     Other Mother         bicuspid aortic valve    Hypertension Father     Hypothyroidism Father     Mental illness Father     Thyroid disease Father     Hypertension Brother     Cancer Maternal Grandmother     Heart disease Maternal Grandmother     Stroke Maternal Grandmother     Breast cancer Maternal Grandmother     Skin cancer Maternal Grandmother     Pneumonia Maternal Grandfather     Cancer Maternal Grandfather     Cancer Paternal Grandmother     Pneumonia Paternal Grandfather     Arthritis Family     Breast cancer Family     Osteopenia Family     Osteoporosis Family     Hypertension Family     Transient ischemic attack Family      I have reviewed and agree with the history as documented  E-Cigarette/Vaping    E-Cigarette Use Never User      E-Cigarette/Vaping Substances    Nicotine No     THC No     CBD No     Flavoring No     Other No     Unknown No      Social History     Tobacco Use    Smoking status: Never Smoker    Smokeless tobacco: Never Used   Substance Use Topics    Alcohol use: Not Currently    Drug use: No       Review of Systems   Constitutional: Positive for fatigue  Negative for chills and fever  HENT: Negative for ear pain and sore throat      Eyes: Negative for pain and visual disturbance  Respiratory: Negative for cough and shortness of breath  Cardiovascular: Negative for chest pain and palpitations  Gastrointestinal: Negative for abdominal pain and vomiting  Genitourinary: Negative for dysuria and hematuria  Musculoskeletal: Negative for arthralgias and back pain  Skin: Negative for color change and rash  Neurological: Positive for headaches  Negative for dizziness, tremors, seizures, syncope, facial asymmetry, speech difficulty and numbness  All other systems reviewed and are negative  Physical Exam  Physical Exam  Vitals signs reviewed  Constitutional:       General: She is not in acute distress  Appearance: She is well-developed  She is not ill-appearing or diaphoretic  HENT:      Head: Normocephalic  Right Ear: Tympanic membrane and external ear normal  No swelling  Tympanic membrane is not bulging  Left Ear: Tympanic membrane and external ear normal  No swelling  Tympanic membrane is not bulging  Ears:      Comments: Negative hemotympanum     Nose: Nose normal       Mouth/Throat:      Pharynx: No oropharyngeal exudate  Eyes:      General: Lids are normal       Conjunctiva/sclera: Conjunctivae normal       Pupils: Pupils are equal, round, and reactive to light  Neck:      Musculoskeletal: Normal range of motion and neck supple  Normal range of motion  No edema  Thyroid: No thyromegaly  Vascular: No JVD  Trachea: No tracheal deviation  Cardiovascular:      Rate and Rhythm: Normal rate and regular rhythm  Pulses: Normal pulses  Heart sounds: Normal heart sounds  No murmur  No friction rub  No gallop  Pulmonary:      Effort: Pulmonary effort is normal  No respiratory distress  Breath sounds: Normal breath sounds  No stridor  No wheezing or rales  Chest:      Chest wall: No tenderness  Abdominal:      Tenderness: Negative signs include Pino's sign     Musculoskeletal: Normal range of motion  General: No tenderness  Lymphadenopathy:      Cervical: No cervical adenopathy  Skin:     General: Skin is warm and dry  Capillary Refill: Capillary refill takes less than 2 seconds  Coloration: Skin is not pale  Findings: No erythema or rash  Neurological:      General: No focal deficit present  Mental Status: She is alert and oriented to person, place, and time  GCS: GCS eye subscore is 4  GCS verbal subscore is 5  GCS motor subscore is 6  Cranial Nerves: No cranial nerve deficit  Sensory: No sensory deficit  Coordination: Coordination normal       Gait: Gait normal       Deep Tendon Reflexes: Reflexes are normal and symmetric  Comments: No nuchal rigidity    Kernig's and Brudzinski's test both negative   Psychiatric:         Mood and Affect: Mood normal          Speech: Speech normal          Behavior: Behavior normal          Vital Signs  ED Triage Vitals [01/04/21 1244]   Temperature Pulse Respirations Blood Pressure SpO2   97 8 °F (36 6 °C) 82 20 146/85 97 %      Temp Source Heart Rate Source Patient Position - Orthostatic VS BP Location FiO2 (%)   Temporal -- Sitting Right arm --      Pain Score       5           Vitals:    01/04/21 1244   BP: 146/85   Pulse: 82   Patient Position - Orthostatic VS: Sitting         Visual Acuity      ED Medications  Medications   sodium chloride 0 9 % bolus 1,000 mL (0 mL Intravenous Stopped 1/4/21 1417)   ketorolac (TORADOL) injection 15 mg (15 mg Intravenous Given 1/4/21 1321)   metoclopramide (REGLAN) injection 10 mg (10 mg Intravenous Given 1/4/21 1319)   diphenhydrAMINE (BENADRYL) injection 25 mg (25 mg Intravenous Given 1/4/21 1317)   magnesium sulfate 2 g/50 mL IVPB (premix) 2 g (0 g Intravenous Stopped 1/4/21 1417)       Diagnostic Studies  Results Reviewed     Procedure Component Value Units Date/Time    Comprehensive metabolic panel [[id number]] Collected: 01/04/21 1303    Lab Status: Final result Specimen: Blood from Arm, Right Updated: 01/04/21 1348     Sodium 137 mmol/L      Potassium 4 6 mmol/L      Chloride 102 mmol/L      CO2 25 mmol/L      ANION GAP 10 mmol/L      BUN 13 mg/dL      Creatinine 0 78 mg/dL      Glucose 90 mg/dL      Calcium 9 2 mg/dL      AST 25 U/L      ALT 31 U/L      Alkaline Phosphatase 107 U/L      Total Protein 7 5 g/dL      Albumin 3 5 g/dL      Total Bilirubin 0 30 mg/dL      eGFR 98 ml/min/1 73sq m     Narrative:      National Kidney Disease Foundation guidelines for Chronic Kidney Disease (CKD):     Stage 1 with normal or high GFR (GFR > 90 mL/min/1 73 square meters)    Stage 2 Mild CKD (GFR = 60-89 mL/min/1 73 square meters)    Stage 3A Moderate CKD (GFR = 45-59 mL/min/1 73 square meters)    Stage 3B Moderate CKD (GFR = 30-44 mL/min/1 73 square meters)    Stage 4 Severe CKD (GFR = 15-29 mL/min/1 73 square meters)    Stage 5 End Stage CKD (GFR <15 mL/min/1 73 square meters)  Note: GFR calculation is accurate only with a steady state creatinine    CBC and differential [[id number]]  (Abnormal) Collected: 01/04/21 1303    Lab Status: Final result Specimen: Blood from Arm, Right Updated: 01/04/21 1312     WBC 9 97 Thousand/uL      RBC 4 94 Million/uL      Hemoglobin 14 1 g/dL      Hematocrit 45 2 %      MCV 92 fL      MCH 28 5 pg      MCHC 31 2 g/dL      RDW 13 9 %      MPV 9 8 fL      Platelets 933 Thousands/uL      nRBC 0 /100 WBCs      Neutrophils Relative 62 %      Immat GRANS % 1 %      Lymphocytes Relative 28 %      Monocytes Relative 5 %      Eosinophils Relative 3 %      Basophils Relative 1 %      Neutrophils Absolute 6 21 Thousands/µL      Immature Grans Absolute 0 08 Thousand/uL      Lymphocytes Absolute 2 78 Thousands/µL      Monocytes Absolute 0 54 Thousand/µL      Eosinophils Absolute 0 26 Thousand/µL      Basophils Absolute 0 10 Thousands/µL     POCT pregnancy, urine [[id number]]     Lab Status: No result                  CT head without contrast Final Result by Vivienne Oliver MD (01/04 5007)      No acute intracranial abnormality  Crowding noted in the foramen magnum  Consider MRI to assess for possible Chiari I malformation  Workstation performed: [id number]                    Procedures  Procedures         ED Course  ED Course as of Jan 04 1556   Mon Jan 04, 2021   1321 WBC: 9 97   1321 Hemoglobin: 14 1   1321 Platelet Count: 345   1404 Sodium: 137   1405 Calcium: 9 2   1405 eGFR: 98   1444 Pts HA down to a 2 from a 5 initially      1512 IMPRESSION:     No acute intracranial abnormality      Crowding noted in the foramen magnum  Consider MRI to assess for possible Chiari I malformation  SBIRT 20yo+      Most Recent Value   SBIRT (24 yo +)   In order to provide better care to our patients, we are screening all of our patients for alcohol and drug use  Would it be okay to ask you these screening questions? Yes Filed at: 01/04/2021 1254   Initial Alcohol Screen: US AUDIT-C    1  How often do you have a drink containing alcohol?  0 Filed at: 01/04/2021 1254   2  How many drinks containing alcohol do you have on a typical day you are drinking? 0 Filed at: 01/04/2021 1254   3a  Male UNDER 65: How often do you have five or more drinks on one occasion? 0 Filed at: 01/04/2021 1254   3b  FEMALE Any Age, or MALE 65+: How often do you have 4 or more drinks on one occassion? 0 Filed at: 01/04/2021 1254   Audit-C Score  0 Filed at: 01/04/2021 1254   JENNIFER: How many times in the past year have you    Used an illegal drug or used a prescription medication for non-medical reasons?   Never Filed at: 01/04/2021 1254                    MDM    Disposition  Final diagnoses:   Headache   Migraine     Time reflects when diagnosis was documented in both MDM as applicable and the Disposition within this note     Time User Action Codes Description Comment    1/4/2021  3:55 PM Tam AGARWAL Add [R51 9] Headache 1/4/2021  3:56 PM Dean AGARWAL Add [O19 097] Migraine       ED Disposition     ED Disposition Condition Date/Time Comment    Discharge Stable Mon Jan 4, 2021  3:55 PM Essence Traylor discharge to home/self care  Follow-up Information     Follow up With Specialties Details Why [address] Janie Flood PA-C Family Medicine, Physician Assistant Schedule an appointment as soon as possible for a visit  As needed [address]  [facility] [place] [id number]  [phone]            Patient's Medications   Discharge Prescriptions    No medications on file     No discharge procedures on file      PDMP Review     None          ED Provider  Electronically Signed by           Angelina Jett PA-C  01/04/21 1542

## 2021-01-06 ENCOUNTER — OFFICE VISIT (OUTPATIENT)
Dept: NEUROLOGY | Facility: CLINIC | Age: 36
End: 2021-01-06
Payer: COMMERCIAL

## 2021-01-06 VITALS
BODY MASS INDEX: 44.41 KG/M2 | HEART RATE: 89 BPM | SYSTOLIC BLOOD PRESSURE: 138 MMHG | DIASTOLIC BLOOD PRESSURE: 65 MMHG | TEMPERATURE: 97.6 F | WEIGHT: 293 LBS | HEIGHT: 68 IN

## 2021-01-06 DIAGNOSIS — M54.2 CERVICALGIA: ICD-10-CM

## 2021-01-06 DIAGNOSIS — G43.719 INTRACTABLE CHRONIC MIGRAINE WITHOUT AURA AND WITHOUT STATUS MIGRAINOSUS: ICD-10-CM

## 2021-01-06 DIAGNOSIS — G43.909 MIGRAINE: ICD-10-CM

## 2021-01-06 DIAGNOSIS — G93.2 PSEUDOTUMOR CEREBRI: ICD-10-CM

## 2021-01-06 DIAGNOSIS — G93.2 IDIOPATHIC INTRACRANIAL HYPERTENSION: ICD-10-CM

## 2021-01-06 DIAGNOSIS — G43.709 CHRONIC MIGRAINE WITHOUT AURA WITHOUT STATUS MIGRAINOSUS, NOT INTRACTABLE: Primary | ICD-10-CM

## 2021-01-06 PROCEDURE — 3075F SYST BP GE 130 - 139MM HG: CPT | Performed by: PHYSICIAN ASSISTANT

## 2021-01-06 PROCEDURE — 3008F BODY MASS INDEX DOCD: CPT | Performed by: PHYSICIAN ASSISTANT

## 2021-01-06 PROCEDURE — 3078F DIAST BP <80 MM HG: CPT | Performed by: PHYSICIAN ASSISTANT

## 2021-01-06 PROCEDURE — 99024 POSTOP FOLLOW-UP VISIT: CPT | Performed by: PHYSICIAN ASSISTANT

## 2021-01-06 RX ORDER — KETOROLAC TROMETHAMINE 10 MG/1
10 TABLET, FILM COATED ORAL 2 TIMES DAILY PRN
Qty: 10 TABLET | Refills: 0 | Status: SHIPPED | OUTPATIENT
Start: 2021-01-06 | End: 2021-10-16

## 2021-01-06 RX ORDER — TOPIRAMATE 100 MG/1
100 TABLET, FILM COATED ORAL DAILY
Qty: 30 TABLET | Refills: 2
Start: 2021-01-06 | End: 2021-06-29 | Stop reason: SDUPTHER

## 2021-01-06 RX ORDER — FUROSEMIDE 40 MG/1
TABLET ORAL
Qty: 90 TABLET | Refills: 1 | Status: SHIPPED | OUTPATIENT
Start: 2021-01-06 | End: 2021-04-01 | Stop reason: SDUPTHER

## 2021-01-06 RX ORDER — PROCHLORPERAZINE MALEATE 10 MG
TABLET ORAL
Qty: 10 TABLET | Refills: 0 | Status: SHIPPED | OUTPATIENT
Start: 2021-01-06 | End: 2021-10-16

## 2021-01-06 NOTE — PROGRESS NOTES
Patient ID: Olinda Estrada is a 38 y o  female  Assessment/Plan:    Chronic migraine without aura without status migrainosus, not intractable  · Pt will continue Botox  · Since starting Botox pt has had a reduction in migraine headaches by 7 days as correlated by a headache diary  Pt has had decreased trips to the ER and decreased use of abortive medications  · A brain MRI will be ordered due to a question of Chiari malformation seen on head CT  CT reviewed with pt  · She will take ketorolac and prochlorperazine together at the onset of acute migraine  I have spent 40 minutes with Patient  today in which greater than 50% of this time was spent in counseling/coordination of care regarding Diagnostic results, Prognosis, Risks and benefits of tx options, Intructions for management, Patient and family education, Importance of tx compliance, Risk factor reductions and Impressions  She will follow-up in 3 months  Cervicalgia  · Pt reports headaches do seem to be coming from the neck more  · She is willing to try physical therapy  · MRI of the C-spine will be considered if PT does not help  · Trigger point injections could be considered as well  Idiopathic intracranial hypertension  · Pt will continue Lasix  · Pt expresses understanding about the role of weight loss in improving symptoms  Problem List Items Addressed This Visit        Cardiovascular and Mediastinum    Chronic migraine without aura without status migrainosus, not intractable - Primary     · Pt will continue Botox  · Since starting Botox pt has had a reduction in migraine headaches by 7 days as correlated by a headache diary  Pt has had decreased trips to the ER and decreased use of abortive medications  · A brain MRI will be ordered due to a question of Chiari malformation seen on head CT  CT reviewed with pt  · She will take ketorolac and prochlorperazine together at the onset of acute migraine    I have spent 40 minutes with Patient  today in which greater than 50% of this time was spent in counseling/coordination of care regarding Diagnostic results, Prognosis, Risks and benefits of tx options, Intructions for management, Patient and family education, Importance of tx compliance, Risk factor reductions and Impressions  She will follow-up in 3 months  Relevant Medications    topiramate (TOPAMAX) 100 mg tablet    ketorolac (TORADOL) 10 mg tablet    prochlorperazine (COMPAZINE) 10 mg tablet    Other Relevant Orders    MRI brain without contrast       Other    Cervicalgia     · Pt reports headaches do seem to be coming from the neck more  · She is willing to try physical therapy  · MRI of the C-spine will be considered if PT does not help  · Trigger point injections could be considered as well  Relevant Orders    Ambulatory referral to Physical Therapy      Other Visit Diagnoses     Pseudotumor cerebri        Intractable chronic migraine without aura and without status migrainosus        Relevant Medications    topiramate (TOPAMAX) 100 mg tablet    ketorolac (TORADOL) 10 mg tablet    prochlorperazine (COMPAZINE) 10 mg tablet    Migraine        Relevant Medications    topiramate (TOPAMAX) 100 mg tablet    ketorolac (TORADOL) 10 mg tablet    Other Relevant Orders    MRI brain without contrast             Subjective:    HPI    We had the pleasure of evaluating Fawad Seeds in neurological consultation today for headaches  As you know,  she is a 38 y o  right handed  female  Patient currently lives with her friend  She is here today for evaluation of her headaches  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  She is on disability at this time for her underlying mood disorders  At her last appt she was having an increased number of severe headaches  Authorization for Botox was obtained and she has had two set of injections  Lasix was increased and she is using CPAP regularly   She has weaned topiramate down to 100mg daily  She states that she has cognitively improved since decreasing the medication  Since starting Botox pt has had a reduction in migraine headaches by 7 days as correlated by a headache diary  Pt has had decreased trips to the ER and decreased use of abortive medications  Despite the overall improvement in migraines, she was seen in the ER 1/4/2021 due to a 5 day migraine  She did have a CT which was concerning for Chiari malformation  A brain MRI was recommended  In the ER she was given IV fluids, ketorolac, Benadryl, Reglan and magnesium  She reports neck pain associated with the headache  Anxiety:  Patient has longstanding history of anxiety and was diagnosed with bipolar disorder in the past   Patient states that is a missed diagnosis as she was recently given the diagnosis of obsessive-compulsive disorder  She overall feels that her underlying psychiatric problems are well controlled  She is on the right medication at this time  Did complete Transcranial Magnetic Stimulation which significantly helped her underlying mood disorders  - regularly sees a therapist and a psychiatrist       Weight:   Patient states that she has always been overweight  In 2006 she lost about 135 lb as she was trying to lose weight but ended up becoming anorexic  Following that she was hospitalized for the eating disorder and later ended up gaining about 200 lb  Has had difficulty losing weight since  She has seen weight management therapist and sees one now at this time  She seems well educated with the diagnosis of idiopathic intracranial hypertension and knows that weight loss is one of the treatments  Any family history of migraines? No  Any family history of aneurysms? No     Idiopathic intracranial hypertension:  She is given the diagnosis back when she was 11years of age with opening pressure of 42 mm of water  Was on Diamox at that time    Her recent lumbar puncture showed an opening pressure of 17  Not documented in the note this number is per patient  Ophthalmology-  - sees an ophthalmologist who did inform the patient that she has left optic nerve swelling only  Chronic daily headaches/chronic migraine headaches:  What is your current pain level - 2/10     Headaches started at what age? 8years old but when she was 14 they got much worse  When she was 15 she was given the diagnosis of IIH and LP done then and had OP of 42 mm of water  She states she was put on diamox and then symptoms resolved  She states back in fall of 2018 they started again and then started to have loss of vision on the left side when she moved quickly or when she bent over  She was seen by ophthalmology and told that she had optic nerve swelling on the left but not right  How often do the headaches occur? No migraines in the past 2 5 weeks  She has had more mild headaches     What time of the day do the headaches start? Headache can be present upon awakening or comes on during the day  How long do the headaches last? 5 day headache cycle     Are you ever headache free? Yes  Aura and how long does it last - none     Describe your usual headache - Pressure, throbbing     Where is your headache located? Frontal and orbital region mostly but can also be in the neck and occipital area  What is the intensity of pain? 6/10 on average     Associated symptoms:   · Decrease of appetite  · Photophobia, phonophobia, sensitivity to smell   · Blind spot in her vision  · Tinnitus  · light-headed or dizzy, stiff or sore neck,   · prefer to be alone and in a dark room, unable to work     Number of days missed per month because of headaches:  Work (or school) days: Not presently working    Social or Family activities: often     Headache are worse if the patient: cough, sneeze, bending over, may make the vision worse as well  Headache triggers:  Cold weather, Lack of sleep, skipping meals  What time of the year do headaches occur more frequently? None     Have you seen someone else for headaches or pain? Yes  Have you had trigger point injection performed and how often? No  Have you had Botox injection performed and how often? Yes just started  Have you had epidural injections or transforaminal injections performed? No     What medications do you take or have you taken for your headaches? PREVENTATIVE:  - magnesium, B2, vitamin-D  - Claritin  - lisinopril  - Wellbutrin, Zoloft, haloperidol, lithium, Latuda, Cogentin, Risperdal  - Topamax, gabapentin, Lamictal, diamox, cyclobenzabrine, tizanidine  ABORTIVE:  - Toradol, ibuprofen, diclofenac  - hydrocodone  - Ativan     Neck pain   When did the neck pain start? Has worsened over the past few months  Does your neck pain cause you to have headaches? Possibly  Sleep Habit  Is your sleep restful? no  What time do you go to bed at night? Varies  What time do you wake up in am? 5 to 5:30  How often do you get up at night? 2-3 times  Do you wake up with headaches? Can   Do you snore while asleep? no  Have you been told that you stop breathing during sleeping? no  Do you wake up tired in the morning? yes  Do you take frequent naps during the day? sometimes  Do you have jaw pain? no  Do you grind/clench your teeth at night? yes  Do you have restless leg syndrome? yes  Do you have nightmare or sleep walk? Not usually     Alternative therapies used in the past for headaches? no other headache interventions have been tried  Have you used CBD or THC for your headaches and how often? No  Are you current pregnant or planning on getting pregnant? No  Have you ever had any Brain imaging? 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 - pt did see ENT - no concerns re this finding      The following portions of the patient's history were reviewed and updated as appropriate: allergies, current medications, past family history, past medical history, past social history, past surgical history and problem list          Objective:    Blood pressure 138/65, pulse 89, temperature 97 6 °F (36 4 °C), temperature source Temporal, height 5' 8" (1 727 m), weight (!) 195 kg (430 lb)  Physical Exam  Vitals signs reviewed  Constitutional:       Appearance: She is obese  Eyes:      General: Lids are normal       Extraocular Movements: Extraocular movements intact  Pupils: Pupils are equal, round, and reactive to light  Neurological:      Coordination: Coordination is intact  Deep Tendon Reflexes: Strength normal and reflexes are normal and symmetric  Psychiatric:         Speech: Speech normal          Neurological Exam  Mental Status   Oriented to person, place, time and situation  Recent and remote memory are intact  Speech is normal  Language is fluent with no aphasia  Attention and concentration are normal  Fund of knowledge is appropriate for level of education  Apraxia absent  Cranial Nerves  CN II: Visual acuity is normal  Visual fields full to confrontation  CN III, IV, VI: Extraocular movements intact bilaterally  Normal lids and orbits bilaterally  Pupils equal round and reactive to light bilaterally  CN V: Facial sensation is normal   CN VII: Full and symmetric facial movement  CN VIII: Hearing is normal   CN IX, X: Palate elevates symmetrically  Normal gag reflex  CN XI: Shoulder shrug strength is normal   CN XII: Tongue midline without atrophy or fasciculations  Motor   Strength is 5/5 throughout all four extremities  Sensory  Sensation is intact to light touch, pinprick, vibration and proprioception in all four extremities  Reflexes  Deep tendon reflexes are 2+ and symmetric in all four extremities with downgoing toes bilaterally  Coordination  Finger-to-nose, rapid alternating movements and heel-to-shin normal bilaterally without dysmetria      Gait  Normal casual, toe, heel and tandem gait         ROS:    Review of Systems   Constitutional: Negative  Negative for appetite change and fever  HENT: Negative  Negative for hearing loss, tinnitus, trouble swallowing and voice change  Eyes: Negative  Negative for photophobia and pain  Respiratory: Negative  Negative for shortness of breath  Cardiovascular: Negative  Negative for palpitations  Gastrointestinal: Negative  Negative for nausea and vomiting  Endocrine: Negative  Negative for cold intolerance  Genitourinary: Negative  Negative for dysuria, frequency and urgency  Musculoskeletal: Negative  Negative for myalgias and neck pain  Skin: Negative  Negative for rash  Neurological: Positive for headaches  Negative for dizziness, tremors, seizures, syncope, facial asymmetry, speech difficulty, weakness, light-headedness and numbness  Patient stated that she has headaches a lot now  Hematological: Negative  Does not bruise/bleed easily  Psychiatric/Behavioral: Negative  Negative for confusion, hallucinations and sleep disturbance  Review of systems personally reviewed

## 2021-01-06 NOTE — ASSESSMENT & PLAN NOTE
· Pt will continue Botox  · Since starting Botox pt has had a reduction in migraine headaches by 7 days as correlated by a headache diary  Pt has had decreased trips to the ER and decreased use of abortive medications  · A brain MRI will be ordered due to a question of Chiari malformation seen on head CT  CT reviewed with pt  · She will take ketorolac and prochlorperazine together at the onset of acute migraine  I have spent 40 minutes with Patient  today in which greater than 50% of this time was spent in counseling/coordination of care regarding Diagnostic results, Prognosis, Risks and benefits of tx options, Intructions for management, Patient and family education, Importance of tx compliance, Risk factor reductions and Impressions  She will follow-up in 3 months

## 2021-01-06 NOTE — ASSESSMENT & PLAN NOTE
· Pt will continue Lasix  · Pt expresses understanding about the role of weight loss in improving symptoms

## 2021-01-06 NOTE — PATIENT INSTRUCTIONS
Chronic migraine without aura without status migrainosus, not intractable  · Pt will continue Botox  · Since starting Botox pt has had a reduction in migraine headaches by 7 days as correlated by a headache diary  Pt has had decreased trips to the ER and decreased use of abortive medications  · A brain MRI will be ordered due to a question of Chiari malformation seen on head CT  CT reviewed with pt  · She will take ketorolac and prochlorperazine together at the onset of acute migraine  I have spent 40 minutes with Patient  today in which greater than 50% of this time was spent in counseling/coordination of care regarding Diagnostic results, Prognosis, Risks and benefits of tx options, Intructions for management, Patient and family education, Importance of tx compliance, Risk factor reductions and Impressions  She will follow-up in 3 months  Cervicalgia  · Pt reports headaches do seem to be coming from the neck more  · She is willing to try physical therapy  · MRI of the C-spine will be considered if PT does not help  · Trigger point injections could be considered as well  Idiopathic intracranial hypertension  · Pt will continue Lasix

## 2021-01-06 NOTE — ASSESSMENT & PLAN NOTE
· Pt reports headaches do seem to be coming from the neck more  · She is willing to try physical therapy  · MRI of the C-spine will be considered if PT does not help  · Trigger point injections could be considered as well

## 2021-01-15 ENCOUNTER — TELEPHONE (OUTPATIENT)
Dept: UROLOGY | Facility: AMBULATORY SURGERY CENTER | Age: 36
End: 2021-01-15

## 2021-01-15 DIAGNOSIS — G43.709 CHRONIC MIGRAINE WITHOUT AURA WITHOUT STATUS MIGRAINOSUS, NOT INTRACTABLE: Primary | ICD-10-CM

## 2021-01-15 RX ORDER — DICLOFENAC POTASSIUM 50 MG/1
50 POWDER, FOR SOLUTION ORAL DAILY PRN
Qty: 9 EACH | Refills: 0 | Status: SHIPPED | OUTPATIENT
Start: 2021-01-15 | End: 2021-04-20 | Stop reason: ALTCHOICE

## 2021-01-15 NOTE — TELEPHONE ENCOUNTER
Patient had to cancel her appointment today with Dr Saulo Weems due to a migraine  Patient aware that we will call her to reschedule this appointment once the [facility] schedule opens in [place] for [employer]  Didn't really see anything for Karely Davalos  Patient can't do Tuesdays  Please schedule patient when able for her follow up  Offered patient the 2 open appointments I saw but patient unable to do those days

## 2021-01-19 ENCOUNTER — TELEPHONE (OUTPATIENT)
Dept: NEUROLOGY | Facility: CLINIC | Age: 36
End: 2021-01-19

## 2021-01-19 NOTE — TELEPHONE ENCOUNTER
pt called regarding mri that was cancelled because it was not authorized  she states that she called the insurance company after she received unique' message and they said that they just needed more info from our office  she states that they need additional documentation and provider chose to cancel mri rather than giving additional documentation  i made her aware that danielle did peer to peer and it was denied    please advise    [phone] to leave a detailed message

## 2021-01-20 NOTE — TELEPHONE ENCOUNTER
The MRI was denied as the pt had an MRI in the past that did not show a Chiari malformation in 2019 and she has a normal neuro exam  Can she be set up for a virtual or in person appt to discuss headaches further and see if she meets any other criteria to get an MRI? Virtual appt may be easier as she does come from a distance  Thanks

## 2021-01-25 ENCOUNTER — TELEPHONE (OUTPATIENT)
Dept: GASTROENTEROLOGY | Facility: CLINIC | Age: 36
End: 2021-01-25

## 2021-01-25 NOTE — TELEPHONE ENCOUNTER
Patient contacted office to reschedule procedure to 3/15/2021 with Dr Goncalves Code  She stated she received Miralax/Dulcolx procedure directions in a procedure packet and will contact our office if any questions

## 2021-01-26 ENCOUNTER — TELEPHONE (OUTPATIENT)
Dept: NEUROLOGY | Facility: CLINIC | Age: 36
End: 2021-01-26

## 2021-01-26 NOTE — TELEPHONE ENCOUNTER
----- Message from Lo Guerra RN sent at 1/18/2021 11:16 AM EST -----  Regarding: FW: Visit Follow-Up Question  Contact: [phone]    ----- Message -----  From: Hilary Newman PA-C  Sent: 1/15/2021   2:51 PM EST  To: [facility]  Subject: FW: Visit Follow-Up Question                     Spoke with pt  Cambia sent to her pharmacy  She would like to hold off on PT for now  She is agreeable to evaluation for trigger point injections  Referral placed for Dr Kev Miller  Please assist pt in scheduling an appt     ----- Message -----  From: Greg Ruvalcaba RN  Sent: 1/15/2021   1:51 PM EST  To: Hilary Newman PA-C  Subject: FW: Visit Follow-Up Question                       ----- Message -----  From: Sonya Murdock  Sent: 1/15/2021   1:03 PM EST  To: [facility]  Subject: RE: Visit Follow-Up Question                     Thanks for getting back to me  I am willing to try Cambia in place of Toradol  Is it something I can take with my other prescription meds? I am open to anything, but would basically have the same concern with any medication  Also,  if there is a side effect of constipation that goes beyond minor that would be an issue as Im taking 290mcg of Linzess daily just to deal with that  I have been scheduled for the initial PT eval for this Monday  Im not sure if that is the best move right now I it seems so little sets off major headaches and Im basically terrified PT will do more harm than good right now  Im looking for some guidance as far as that goes also  I dont know all that much about trigger point injections, but I am willing to try it if it is recommended  If its easier to reach me by phone you can do that also  [phone]    Thank you

## 2021-01-26 NOTE — TELEPHONE ENCOUNTER
Pt made aware  Virtual video appt scheduled for tomorrow with danielle  She also wanted to mention that she  was on adderall and switched to vyvansse and since the change her headaches drastically less frequent  she switched meds about 10 days ago  she is not sure if this is related to the change in medicaiton  she states that she will ask her psychiatrist tomoorow also        She will use email address on file    Cuong Ferrari made aware  [facility] made aware

## 2021-01-26 NOTE — TELEPHONE ENCOUNTER
Patient schedule trigger point alpesh 2-19-21 2:00pm appointment reminder sent directions [facility] office

## 2021-01-27 ENCOUNTER — TELEMEDICINE (OUTPATIENT)
Dept: NEUROLOGY | Facility: CLINIC | Age: 36
End: 2021-01-27
Payer: COMMERCIAL

## 2021-01-27 VITALS — HEIGHT: 68 IN | BODY MASS INDEX: 65.38 KG/M2

## 2021-01-27 DIAGNOSIS — G93.2 IDIOPATHIC INTRACRANIAL HYPERTENSION: ICD-10-CM

## 2021-01-27 DIAGNOSIS — G43.709 CHRONIC MIGRAINE WITHOUT AURA WITHOUT STATUS MIGRAINOSUS, NOT INTRACTABLE: Primary | ICD-10-CM

## 2021-01-27 PROCEDURE — 99213 OFFICE O/P EST LOW 20 MIN: CPT | Performed by: PHYSICIAN ASSISTANT

## 2021-01-27 PROCEDURE — 1036F TOBACCO NON-USER: CPT | Performed by: PHYSICIAN ASSISTANT

## 2021-01-27 NOTE — ASSESSMENT & PLAN NOTE
· Continue current medications  · Continue Botox injections  · Will hold off on an MRI at this point as pt has had improvement in headaches in the past 10 days  · She is scheduled to see PMR for possible trigger point injections due to complaints of occipital pain and neck pain at her last appt  These symptoms have improved as well  I have spent 20 minutes with Patient  today in which greater than 50% of this time was spent in counseling/coordination of care regarding Diagnostic results, Prognosis, Risks and benefits of tx options, Intructions for management, Patient and family education, Importance of tx compliance, Risk factor reductions and Impressions  She will follow-up in April as scheduled

## 2021-01-27 NOTE — PROGRESS NOTES
Virtual Regular Visit      Assessment/Plan:    Problem List Items Addressed This Visit        Cardiovascular and Mediastinum    Chronic migraine without aura without status migrainosus, not intractable - Primary     · Continue current medications  · Continue Botox injections  · Will hold off on an MRI at this point as pt has had improvement in headaches in the past 10 days  · She is scheduled to see PMR for possible trigger point injections due to complaints of occipital pain and neck pain at her last appt  These symptoms have improved as well  I have spent 20 minutes with Patient  today in which greater than 50% of this time was spent in counseling/coordination of care regarding Diagnostic results, Prognosis, Risks and benefits of tx options, Intructions for management, Patient and family education, Importance of tx compliance, Risk factor reductions and Impressions  She will follow-up in April as scheduled  Nervous and Auditory    Idiopathic intracranial hypertension     · Continue Lasix and topiramate  Chronic migraine without aura without status migrainosus, not intractable  · Continue current medications  · Continue Botox injections  · Will hold off on an MRI at this point as pt has had improvement in headaches in the past 10 days  · She is scheduled to see PMR for possible trigger point injections due to complaints of occipital pain and neck pain at her last appt  These symptoms have improved as well  I have spent 20 minutes with Patient  today in which greater than 50% of this time was spent in counseling/coordination of care regarding Diagnostic results, Prognosis, Risks and benefits of tx options, Intructions for management, Patient and family education, Importance of tx compliance, Risk factor reductions and Impressions  She will follow-up in April as scheduled  Idiopathic intracranial hypertension  · Continue Lasix and topiramate          Reason for visit is Chief Complaint   Patient presents with    Virtual Regular Visit        Encounter provider Abby Sood PA-C    Provider located at [facility]  [address] [place] [id number]      Recent Visits  Date Type Provider Dept   01/26/21 Telephone Monica Celestin [facility]   Showing recent visits within past 7 days and meeting all other requirements     Today's Visits  Date Type Provider Dept   01/27/21 Telemedicine Abby Sood PA-C [facility]   Showing today's visits and meeting all other requirements     Future Appointments  No visits were found meeting these conditions  Showing future appointments within next 150 days and meeting all other requirements        The patient was identified by name and date of birth  Fawad Seeds was informed that this is a telemedicine visit and that the visit is being conducted through [employer] and patient was informed that this is a secure, HIPAA-compliant platform  She agrees to proceed     My office door was closed  No one else was in the room  She acknowledged consent and understanding of privacy and security of the video platform  The patient has agreed to participate and understands they can discontinue the visit at any time  Patient is aware this is a billable service  Yassine Silva is a 38 y o  female, with mood disorder, GERD, hypothyroidism and seasonal allergies, who follows in the office due to migraine headaches and idiopathic intracranial hypertension  HPI     At her last appt she was having an increased number of severe headaches  Authorization for Botox was obtained and she has had two set of injections  Lasix was increased and she is using CPAP regularly  She has weaned topiramate down to 100mg daily  She states that she has cognitively improved since decreasing the medication       Since starting Botox pt has had a reduction in migraine headaches by 7 days as correlated by a headache diary  Pt has had decreased trips to the ER and decreased use of abortive medications  Despite the overall improvement in migraines, she was seen in the ER 1/4/2021 due to a 5 day migraine  She did have a CT which was concerning for Chiari malformation  A brain MRI was recommended but denied by her insurance company  In the ER she was given IV fluids, ketorolac, Benadryl, Reglan and magnesium  She did have medication changes made by her Psychiatrist  Adderall was changed to Vyvanse  She reports that in the last 10 days she has had significant improvement in headaches  She states that this week has been particularly stressful and she is surprised that she has not had a headache  Anxiety:  Patient has longstanding history of anxiety and was diagnosed with bipolar disorder and obsessive compulsive disorder in the past   She overall feels that her underlying psychiatric problems are well controlled  Did complete Transcranial Magnetic Stimulation which significantly helped her underlying mood disorders  - regularly sees a therapist and a psychiatrist       Weight:   Patient states that she has always been overweight  In 2006 she lost about 135 lb as she was trying to lose weight but ended up becoming anorexic  Following that she was hospitalized for the eating disorder and later ended up gaining about 200 lb  Has had difficulty losing weight since  She has seen a weight management therapist  She seems well educated with the diagnosis of idiopathic intracranial hypertension and knows that weight loss is one of the treatments  Any family history of migraines? No  Any family history of aneurysms? No     Idiopathic intracranial hypertension:  She is given the diagnosis back when she was 11years of age with opening pressure of 42 mm of water  Was on Diamox at that time  The most recent lumbar puncture showed an opening pressure of 17   Not documented in the note this number is per patient  Ophthalmology-  - sees an ophthalmologist yearly  - She continues to have difficulty seeing up close  She was sent to vision therapy but headaches worsened so it was stopped  Chronic daily headaches/chronic migraine headaches:  What is your current pain level - 0/10     Headaches started at what age? 8years old but when she was 14 they got much worse  When she was 15 she was given the diagnosis of IIH and LP done then and had OP of 42 mm of water  She states she was put on diamox and then symptoms resolved  She states back in fall of 2018 they started again and then started to have loss of vision on the left side when she moved quickly or when she bent over  She was seen by ophthalmology and told that she had optic nerve swelling on the left but not right  How often do the headaches occur? No migraines in the past 10 days  What time of the day do the headaches start? Headache can be present upon awakening or comes on during the day  How long do the headaches last? 5 day headache cycle     Are you ever headache free? Yes  Aura and how long does it last - none     Describe your usual headache - Pressure, throbbing     Where is your headache located? Frontal and orbital region mostly but can also be in the neck and occipital area  What is the intensity of pain? 6/10 on average     Associated symptoms:   · Decrease of appetite  · Photophobia, phonophobia, sensitivity to smell   · Blind spot in her vision  · Tinnitus  · light-headed or dizzy, stiff or sore neck,   · prefer to be alone and in a dark room, unable to work     Number of days missed per month because of headaches:  Work (or school) days: Not presently working    Social or Family activities: often     Headache are worse if the patient: cough, sneeze, bending over, may make the vision worse as well  Headache triggers:  Cold weather, Lack of sleep, skipping meals  What time of the year do headaches occur more frequently? None     Have you seen someone else for headaches or pain? Yes  Have you had trigger point injection performed and how often? No  Have you had Botox injection performed and how often? Yes just started  Have you had epidural injections or transforaminal injections performed? No     What medications do you take or have you taken for your headaches? PREVENTATIVE:  - magnesium, B2, vitamin-D  - Claritin  - lisinopril  - Wellbutrin, Zoloft, haloperidol, lithium, Latuda, Cogentin, Risperdal  - Topamax, gabapentin, Lamictal, diamox, cyclobenzabrine, tizanidine  ABORTIVE:  - Toradol, ibuprofen, diclofenac  - hydrocodone  - Ativan     Neck pain   When did the neck pain start? Somewhat improved since the last appt  Does your neck pain cause you to have headaches? Possibly  Sleep Habit  Is your sleep restful? no  What time do you go to bed at night? Varies  What time do you wake up in am? 5 to 5:30  How often do you get up at night? 2-3 times  Do you wake up with headaches? Can   Do you snore while asleep? no  Have you been told that you stop breathing during sleeping? no  Do you wake up tired in the morning? yes  Do you take frequent naps during the day? sometimes  Do you have jaw pain? no  Do you grind/clench your teeth at night? yes  Do you have restless leg syndrome? yes  Do you have nightmare or sleep walk? Not usually     Alternative therapies used in the past for headaches? no other headache interventions have been tried  Have you used CBD or THC for your headaches and how often? No  Are you current pregnant or planning on getting pregnant? No  Have you ever had any Brain imaging? 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 - pt did see ENT - no concerns re this finding      Past Medical History:   Diagnosis Date    Anorexia nervosa in remission     Anxiety     Back pain     Bipolar disorder ([facility] [address] )     Depression     Esophageal reflux 8/15/2013    Hypertension     Hypothyroid     Idiopathic intracranial hypertension     Lumbar degenerative disc disease 5/8/2014    Migraine     Obesity     Obsessive-compulsive disorder     Overactive bladder     Psychiatric disorder     Restless leg syndrome, controlled     Seasonal allergies     Suicide and self-inflicted injury [facility])        Past Surgical History:   Procedure Laterality Date    DENTAL SURGERY      IR LUMBAR PUNCTURE  2/26/2019    SINUS ENDOSCOPY      TONSILLECTOMY         Current Outpatient Medications   Medication Sig Dispense Refill    Botulinum Toxin Type A (Botox) 200 units SOLR Inject as directed every 3 (three) months      buPROPion (WELLBUTRIN XL) 300 mg 24 hr tablet Take 300 mg by mouth daily       Cholecalciferol (VITAMIN D3) 5000 units CAPS Take 5,000 Units by mouth daily      clomiPRAMINE (ANAFRANIL) 50 mg capsule Take 100 mg by mouth 2 (two) times a day      fexofenadine (ALLEGRA) 180 MG tablet Take 180 mg by mouth daily      furosemide (LASIX) 20 mg tablet Take 2 tablets (40 mg total) by mouth daily 180 tablet 3    furosemide (LASIX) 40 mg tablet TAKE 1 TABLET BY MOUTH EVERY DAY 90 tablet 1    gabapentin (NEURONTIN) 600 MG tablet TAKE 1/2 TABLET DAILY IN AM AND 1 AND 1/2 TABLET AT BEDTIME      ketorolac (TORADOL) 10 mg tablet Take 1 tablet (10 mg total) by mouth 2 (two) times a day as needed for moderate pain (migraine headaches) 10 tablet 0    levothyroxine 125 mcg tablet TAKE 1 TABLET BY MOUTH EVERY DAY IN THE MORNING 90 tablet 0    linaCLOtide (Linzess) 290 MCG CAPS Take 1 capsule by mouth daily 30 capsule 5    LORazepam (ATIVAN) 1 mg tablet Take 1 mg by mouth 3 (three) times a day as needed for anxiety      MAGNESIUM PO Take 1 tablet by mouth daily      meloxicam (MOBIC) 7 5 mg tablet TAKE 1 AFTER BREAKFAST AND DINNER DAILY AS NEEDED FOR PAIN AND SCIATICA   60 tablet 0    Multiple Vitamin (MULTIVITAMIN) capsule Take 1 capsule by mouth daily      omeprazole (PriLOSEC) 20 mg delayed release capsule TAKE 1 CAPSULE BY MOUTH TWICE A  capsule 1    prochlorperazine (COMPAZINE) 10 mg tablet 1 tab at onset of migraine, can repeat in 8 hours, can take with triptan/NSAID 10 tablet 0    RESTASIS 0 05 % ophthalmic emulsion PLACE 1 DROP INTO BOTH EYES TWICE A DAY  3    rOPINIRole (REQUIP) 4 mg tablet TAKE 1 TABLET BY MOUTH TWICE A DAY 60 tablet 5    sertraline (ZOLOFT) 100 mg tablet Take 200 mg by mouth daily  0    topiramate (TOPAMAX) 100 mg tablet Take 1 tablet (100 mg total) by mouth daily 30 tablet 2    VRAYLAR 4 5 MG capsule Take 6 mg by mouth daily       amphetamine-dextroamphetamine (ADDERALL) 20 mg tablet Take 20 mg by mouth daily       Diclofenac Potassium,Migraine, (Cambia) 50 MG PACK Take 50 mg by mouth daily as needed (migraine) (Patient not taking: Reported on 1/27/2021) 9 each 0     No current facility-administered medications for this visit  Allergies   Allergen Reactions    Doxycycline      Pt refuses d/t remote H/O intracranial hypertension     Other      Seasonal allergies        Review of Systems   Constitutional: Negative  Negative for appetite change and fever  HENT: Negative  Negative for hearing loss, tinnitus, trouble swallowing and voice change  Eyes: Negative  Negative for photophobia and pain  Respiratory: Negative  Negative for shortness of breath  Cardiovascular: Negative  Negative for palpitations  Gastrointestinal: Negative  Negative for nausea and vomiting  Endocrine: Negative  Negative for cold intolerance  Genitourinary: Negative  Negative for dysuria, frequency and urgency  Musculoskeletal: Negative  Negative for myalgias and neck pain  Skin: Negative  Negative for rash  Neurological: Negative  Negative for dizziness, tremors, seizures, syncope, facial asymmetry, speech difficulty, weakness, light-headedness, numbness and headaches  Hematological: Negative  Does not bruise/bleed easily  Psychiatric/Behavioral: Negative  Negative for confusion, hallucinations and sleep disturbance  All other systems reviewed and are negative  Video Exam    Vitals:    01/27/21 1450   Height: 5' 8" (1 727 m)       Physical Exam  Constitutional:       Appearance: Normal appearance  She is obese  HENT:      Head: Normocephalic  Mouth/Throat:      Mouth: Mucous membranes are moist    Eyes:      Extraocular Movements: Extraocular movements intact  Pulmonary:      Effort: Pulmonary effort is normal    Neurological:      Mental Status: She is alert and oriented to person, place, and time  Psychiatric:         Mood and Affect: Mood normal               VIRTUAL VISIT DISCLAIMER    John Padilla acknowledges that she has consented to an online visit or consultation  She understands that the online visit is based solely on information provided by her, and that, in the absence of a face-to-face physical evaluation by the physician, the diagnosis she receives is both limited and provisional in terms of accuracy and completeness  This is not intended to replace a full medical face-to-face evaluation by the physician  John Padilla understands and accepts these terms

## 2021-01-27 NOTE — PATIENT INSTRUCTIONS
Chronic migraine without aura without status migrainosus, not intractable  · Continue current medications  · Continue Botox injections  · Will hold off on an MRI at this point as pt has had improvement in headaches in the past 10 days  · She is scheduled to see PMR for possible trigger point injections due to complaints of occipital pain and neck pain at her last appt  These symptoms have improved as well  I have spent 20 minutes with Patient  today in which greater than 50% of this time was spent in counseling/coordination of care regarding Diagnostic results, Prognosis, Risks and benefits of tx options, Intructions for management, Patient and family education, Importance of tx compliance, Risk factor reductions and Impressions  She will follow-up in April as scheduled  Idiopathic intracranial hypertension  · Continue Lasix and topiramate

## 2021-01-31 NOTE — PROGRESS NOTES
Virtual Regular Visit      Assessment/Plan:    Problem List Items Addressed This Visit        Respiratory    CAIO (obstructive sleep apnea) - Primary               Reason for visit is   Chief Complaint   Patient presents with    Follow-up     COMPLIANCE    Virtual Regular Visit        Encounter provider Irene Wong MD    Provider located at [address] [place] [id number]      Recent Visits  No visits were found meeting these conditions  Showing recent visits within past 7 days and meeting all other requirements     Today's Visits  Date Type Provider Dept   02/01/21 Telemedicine Irene Wong MD [facility]   Showing today's visits and meeting all other requirements     Future Appointments  No visits were found meeting these conditions  Showing future appointments within next 150 days and meeting all other requirements        The patient was identified by name and date of birth  Dylan Mosley was informed that this is a telemedicine visit and that the visit is being conducted through [facility] [place] [place] and patient was informed that this is a secure, HIPAA-compliant platform  She agrees to proceed     My office door was closed  No one else was in the room  She acknowledged consent and understanding of privacy and security of the video platform  The patient has agreed to participate and understands they can discontinue the visit at any time  Patient is aware this is a billable service  Sleep Medicine Follow-Up Note    HPI: 43yo F with CAIO being seen for a compliance visit  Treatment Summary: PSG done 3/2020: apnea/hypopnea index (AHI) of 5 7 events per hour of sleep  The AHI in the supine position was 7 3  The AHI during REM sleep was 20 3  Moderate to loud intensity snoring was noted  The baseline oxygen saturation with the patient awake was 96 6%  The mean oxygen saturation throughout thestudy was 96 7%   The amount of sleep time below 90% was 0 1 minutes  The lowest oxygen saturation was 89 0%  APAP 5-15cm ordered  HPI:   Today, the patient stated that she has been okay  She feels that the CPAP doesn't bother her, but she falls asleep so fast that sometimes she falls asleep before she can put it on  She has also been congested lately and this has made it hard for her to wear it at night  She was using it initially a lot better, but then her father  and things became harder  She reports her AHI on her petra is less than one  And the 90% pressure is 7cm  Treatment: APAP  -Pressure: 5-15cm   -Pressure intolerance: no   -Aerophagia: no   -Air Hunger: no  -Interface: NM  -Fit: good, but leaks at times when she moves  -Chin strap: no  -HCC: YME  -Patient's perceived outcome: helps her sleep better, not restless, and dreaming now      Compliance Card Data:    n/a    Respiratory:  -Ongoing Snoring: unsure  -Mouth Breathing: only when congested  -Dry Mouth: no  -Nocturnal Gasping: no    ROS:   Genitourinary none   Cardiology none   Gastrointestinal none   Neurology frequent headaches, awaken with headache, numbness/tingling of an extremity, forgetfulness and poor concentration or confusion,    Constitutional none   Integumentary none   Psychiatry anxiety and aggressiveness or irritability   Musculoskeletal muscle aches, back pain and legs twitching/jerking   Pulmonary shortness of breath with activity and snoring   ENT throat clearing   Endocrine frequent urination   Hematological none       Sleep Pattern:    -Position: side  -Bedtime: 9-11pm  -Lights out: same time  -Environmental: No lights/TV  -Latency: mins   -Awakenings: 2               -Reason: void              -Duration: mins  -Wake time: 507am              -Alarm: no  -Rise time: same time  -Days off: same  -Shift Work: disabled  -Patient's estimate of total sleep time: unsure    Daytime Symptoms:  -Upon Awakening: pretty good, headaches still, but better with med adjustment and botox  -Daytime fatigue/sleepiness: sometimes if she doesn't sleep well at night  -Naps: if her night time sleep is poor  -Involuntary Dozing: no  -Cognitive Symptoms: some trouble focusing  -Driving: Difficulty with sleepiness and driving:  denied   -- Close calls related to sleepiness: denied   -- Accidents related to sleepiness: denied    Substance Use:  -Caffeine: 1 cup of coffee a day  -Alcohol: denied  -THC: denied    --> ED visit for headache and CT found a possible chiari malformation since last visit  --> Supplemental Oxygen Use: denies    CT head 1/2021: IMPRESSION:     No acute intracranial abnormality      Crowding noted in the foramen magnum  Consider MRI to assess for possible Chiari I malformation  Labs:  Results for Jessica Mays (MRN [id number]) as of 2/1/2021 12:50   Ref   Range 1/4/2021 13:03   Sodium Latest Ref Range: 136 - 145 mmol/L 137   Potassium Latest Ref Range: 3 5 - 5 3 mmol/L 4 6   Chloride Latest Ref Range: 100 - 108 mmol/L 102   CO2 Latest Ref Range: 21 - 32 mmol/L 25   Anion Gap Latest Ref Range: 4 - 13 mmol/L 10   BUN Latest Ref Range: 5 - 25 mg/dL 13   Creatinine Latest Ref Range: 0 60 - 1 30 mg/dL 0 78   Glucose, Random Latest Ref Range: 65 - 140 mg/dL 90   Calcium Latest Ref Range: 8 3 - 10 1 mg/dL 9 2   AST Latest Ref Range: 5 - 45 U/L 25   ALT Latest Ref Range: 12 - 78 U/L 31   Alkaline Phosphatase Latest Ref Range: 46 - 116 U/L 107   Total Protein Latest Ref Range: 6 4 - 8 2 g/dL 7 5   Albumin Latest Ref Range: 3 5 - 5 0 g/dL 3 5   TOTAL BILIRUBIN Latest Ref Range: 0 20 - 1 00 mg/dL 0 30   eGFR Latest Units: ml/min/1 73sq m 98   WBC Latest Ref Range: 4 31 - 10 16 Thousand/uL 9 97   Red Blood Cell Count Latest Ref Range: 3 81 - 5 12 Million/uL 4 94   Hemoglobin Latest Ref Range: 11 5 - 15 4 g/dL 14 1   HCT Latest Ref Range: 34 8 - 46 1 % 45 2   MCV Latest Ref Range: 82 - 98 fL 92   MCH Latest Ref Range: 26 8 - 34 3 pg 28 5   MCHC Latest Ref Range: 31 4 - 37 4 g/dL 31 2 (L)   RDW Latest Ref Range: 11 6 - 15 1 % 13 9   Platelet Count Latest Ref Range: 149 - 390 Thousands/uL 265   MPV Latest Ref Range: 8 9 - 12 7 fL 9 8   nRBC Latest Units: /100 WBCs 0   Neutrophils % Latest Ref Range: 43 - 75 % 62   Immat GRANS % Latest Ref Range: 0 - 2 % 1   Lymphocytes Relative Latest Ref Range: 14 - 44 % 28   Monocytes Relative Latest Ref Range: 4 - 12 % 5   Eosinophils Latest Ref Range: 0 - 6 % 3   Basophils Relative Latest Ref Range: 0 - 1 % 1   Immature Grans Absolute Latest Ref Range: 0 00 - 0 20 Thousand/uL 0 08   Absolute Neutrophils Latest Ref Range: 1 85 - 7 62 Thousands/µL 6 21   Lymphocytes Absolute Latest Ref Range: 0 60 - 4 47 Thousands/µL 2 78   Absolute Monocytes Latest Ref Range: 0 17 - 1 22 Thousand/µL 0 54   Absolute Eosinophils Latest Ref Range: 0 00 - 0 61 Thousand/µL 0 26   Basophils Absolute Latest Ref Range: 0 00 - 0 10 Thousands/µL 0 10     Questionnaire:  Sitting and reading: Moderate chance of dozing  Watching TV: Slight chance of dozing  Sitting, inactive in a public place (e g  a theatre or a meeting): Would never doze  As a passenger in a car for an hour without a break: Slight chance of dozing  Lying down to rest in the afternoon when circumstances permit: Moderate chance of dozing  Sitting and talking to someone: Would never doze  Sitting quietly after a lunch without alcohol: Would never doze  In a car, while stopped for a few minutes in traffic: Would never doze  Total score: 6      PE:    LMP  (LMP Unknown)    No vs today    General:  In NAD  Pul: Respirations: unlaboured  MS: No atrophy  Neuro: No resting tremor  Psych: Socially appropriate  Pleasant  No overt dysphoria  Assessment: The patient has not been fully compliant this month with her CPAP as she falls asleep before putting it on her face  No download available, but per her EZE her AHI is less than one    She is deriving benefit from using her CPAP as she is less tired than she was in the past and when she uses it she sleeps better and dreams  Will not make any changes to her pressure today, but will give her a FFM for nasal congestion  encouraged her to put it on as soon as she gets into bed  Recommendations:    1) APAP at 5-15cm with FFM  Put mask on as soon as in bed  2) Safe driving reviewed  3) Follow-up 3 months  4) Call with any questions or concerns  All questions answered for the patient, who indicated understanding and agreed with the plan  Irene Wong MD  Psychiatry/ Sleep medicine        I spent 20 minutes with patient today in which greater than 50% of the time was spent in counseling/coordination of care regarding treatment      [address] [facility] [address] acknowledges that she has consented to an online visit or consultation  She understands that the online visit is based solely on information provided by her, and that, in the absence of a face-to-face physical evaluation by the physician, the diagnosis she receives is both limited and provisional in terms of accuracy and completeness  This is not intended to replace a full medical face-to-face evaluation by the physician  Dylan oMsley understands and accepts these terms

## 2021-02-01 ENCOUNTER — TELEMEDICINE (OUTPATIENT)
Dept: SLEEP CENTER | Facility: CLINIC | Age: 36
End: 2021-02-01
Payer: COMMERCIAL

## 2021-02-01 DIAGNOSIS — R09.81 NASAL CONGESTION: ICD-10-CM

## 2021-02-01 DIAGNOSIS — G47.33 OSA (OBSTRUCTIVE SLEEP APNEA): Primary | ICD-10-CM

## 2021-02-01 PROCEDURE — 1036F TOBACCO NON-USER: CPT | Performed by: PSYCHIATRY & NEUROLOGY

## 2021-02-01 PROCEDURE — 99214 OFFICE O/P EST MOD 30 MIN: CPT | Performed by: PSYCHIATRY & NEUROLOGY

## 2021-02-01 NOTE — PATIENT INSTRUCTIONS
1) APAP at 5-15cm with FFM  Put mask on as soon as in bed  2) Safe driving reviewed  3) Follow-up 3 months  4) Call with any questions or concerns

## 2021-02-03 ENCOUNTER — TELEPHONE (OUTPATIENT)
Dept: SLEEP CENTER | Facility: CLINIC | Age: 36
End: 2021-02-03

## 2021-02-03 ENCOUNTER — TELEPHONE (OUTPATIENT)
Dept: OTOLARYNGOLOGY | Facility: CLINIC | Age: 36
End: 2021-02-03

## 2021-02-03 NOTE — TELEPHONE ENCOUNTER
CLM because we need to schedule a 3 month f/u for sleep      Asked Elizabeth to call [phone] to schedule this appointment

## 2021-02-04 NOTE — TELEPHONE ENCOUNTER
Patient called back to schedule appointment  She is having same frequent urination and [facility]  Please advise

## 2021-02-08 ENCOUNTER — TELEPHONE (OUTPATIENT)
Dept: NEUROLOGY | Facility: CLINIC | Age: 36
End: 2021-02-08

## 2021-02-08 NOTE — TELEPHONE ENCOUNTER
Did not contact patient during 2 week reminder calls, as appt was just scheduled within the past two weeks  Will contact patient to complete COVID screening during 2 day confirmation calls

## 2021-02-11 ENCOUNTER — TELEPHONE (OUTPATIENT)
Dept: NEUROLOGY | Facility: CLINIC | Age: 36
End: 2021-02-11

## 2021-02-11 NOTE — TELEPHONE ENCOUNTER
Called patient to reschedule appt with Dr Blaire Teixeira (appt date cancelled for 2/19/21 )  Patient feels she is doing much better and does not feel the need to reschedule at this time     Informed patient to reach out to us if she feels the need to reschedule

## 2021-02-16 NOTE — TELEPHONE ENCOUNTER
Patient called back to state she received another call after stating she would call back to reschedule if she needed it  Patient would not like to be called again about rescheduling

## 2021-03-04 ENCOUNTER — TELEPHONE (OUTPATIENT)
Dept: NEUROLOGY | Facility: CLINIC | Age: 36
End: 2021-03-04

## 2021-03-04 NOTE — TELEPHONE ENCOUNTER
Type Date User Summary Attachment   General 03/04/2021  9:08 AM Celena Villarreal care coordination  -   Note    Botox- authorization #: [id number]- valid from 3/1/2021 until 12/31/2021   Please use [address]      Thank you     Shannan Garcia

## 2021-03-08 ENCOUNTER — TELEPHONE (OUTPATIENT)
Dept: OTHER | Facility: OTHER | Age: 36
End: 2021-03-08

## 2021-03-08 ENCOUNTER — TELEPHONE (OUTPATIENT)
Dept: BARIATRICS | Facility: CLINIC | Age: 36
End: 2021-03-08

## 2021-03-09 ENCOUNTER — TELEPHONE (OUTPATIENT)
Dept: GASTROENTEROLOGY | Facility: CLINIC | Age: 36
End: 2021-03-09

## 2021-03-09 NOTE — TELEPHONE ENCOUNTER
Patients GI provider:  Dr Jacqueline Jaramillo    Number to return call: [phone]    Reason for call: Pt calling to cx her procedure stating she will call back at later time to r/s   Pt also asks if we can call her in a couple of weeks as a reminder to r/s    Scheduled procedure/appointment date if applicable: Procedure - 03/15/21

## 2021-03-10 DIAGNOSIS — Z23 ENCOUNTER FOR IMMUNIZATION: ICD-10-CM

## 2021-03-10 NOTE — TELEPHONE ENCOUNTER
[facility]- spoke with Neal Osman- I advised her I was calling to schedule delivery for the patient's Botox order  Botox to be delivered on Thursday 3/25/2021 to the [place] location via [address] [place] [address] delivery- a signature will be required  Ene[place]/ [place],    Please await the patient's Botox order and document once it has arrived  Please let me know if you do not receive this order      Thank you,    Alisha Hoyos

## 2021-03-10 NOTE — TELEPHONE ENCOUNTER
Emy Patel from [facility] called to schedule botox delivery   Pls call her back at [phone]

## 2021-03-15 DIAGNOSIS — E03.9 HYPOTHYROIDISM, UNSPECIFIED TYPE: ICD-10-CM

## 2021-03-15 RX ORDER — LEVOTHYROXINE SODIUM 0.12 MG/1
TABLET ORAL
Qty: 90 TABLET | Refills: 0 | Status: SHIPPED | OUTPATIENT
Start: 2021-03-15 | End: 2021-06-08

## 2021-03-25 NOTE — TELEPHONE ENCOUNTER
Botox number of units: 200  Botox quantity:1   Arrived at what location: [facility] Location  [address]: [phone]  Lot number: [id number]  Expiration Date: 10/2023    Logged in receiving log  Stored in fridge under Dr Cora Tello

## 2021-04-01 ENCOUNTER — PROCEDURE VISIT (OUTPATIENT)
Dept: NEUROLOGY | Facility: CLINIC | Age: 36
End: 2021-04-01
Payer: COMMERCIAL

## 2021-04-01 VITALS — DIASTOLIC BLOOD PRESSURE: 62 MMHG | SYSTOLIC BLOOD PRESSURE: 130 MMHG | TEMPERATURE: 98.5 F | HEART RATE: 94 BPM

## 2021-04-01 DIAGNOSIS — G43.709 CHRONIC MIGRAINE WITHOUT AURA WITHOUT STATUS MIGRAINOSUS, NOT INTRACTABLE: ICD-10-CM

## 2021-04-01 DIAGNOSIS — G93.2 IDIOPATHIC INTRACRANIAL HYPERTENSION: ICD-10-CM

## 2021-04-01 PROCEDURE — 64615 CHEMODENERV MUSC MIGRAINE: CPT | Performed by: PHYSICIAN ASSISTANT

## 2021-04-01 RX ORDER — FUROSEMIDE 40 MG/1
60 TABLET ORAL DAILY
Qty: 105 TABLET | Refills: 1 | Status: SHIPPED | OUTPATIENT
Start: 2021-04-01 | End: 2021-08-27

## 2021-04-20 ENCOUNTER — OFFICE VISIT (OUTPATIENT)
Dept: FAMILY MEDICINE CLINIC | Facility: CLINIC | Age: 36
End: 2021-04-20
Payer: COMMERCIAL

## 2021-04-20 VITALS
HEIGHT: 68 IN | HEART RATE: 85 BPM | OXYGEN SATURATION: 98 % | TEMPERATURE: 98.4 F | SYSTOLIC BLOOD PRESSURE: 132 MMHG | WEIGHT: 293 LBS | DIASTOLIC BLOOD PRESSURE: 82 MMHG | BODY MASS INDEX: 44.41 KG/M2

## 2021-04-20 DIAGNOSIS — K21.9 GASTROESOPHAGEAL REFLUX DISEASE WITHOUT ESOPHAGITIS: Primary | ICD-10-CM

## 2021-04-20 DIAGNOSIS — E03.9 ACQUIRED HYPOTHYROIDISM: ICD-10-CM

## 2021-04-20 DIAGNOSIS — G47.33 OSA (OBSTRUCTIVE SLEEP APNEA): ICD-10-CM

## 2021-04-20 DIAGNOSIS — E66.01 MORBID OBESITY WITH BMI OF 60.0-69.9, ADULT (HCC): ICD-10-CM

## 2021-04-20 DIAGNOSIS — G43.709 CHRONIC MIGRAINE WITHOUT AURA WITHOUT STATUS MIGRAINOSUS, NOT INTRACTABLE: ICD-10-CM

## 2021-04-20 DIAGNOSIS — G47.33 OBSTRUCTIVE SLEEP APNEA: Primary | ICD-10-CM

## 2021-04-20 DIAGNOSIS — F42.2 MIXED OBSESSIONAL THOUGHTS AND ACTS: ICD-10-CM

## 2021-04-20 DIAGNOSIS — F31.4 SEVERE BIPOLAR I DISORDER, MOST RECENT EPISODE DEPRESSED (HCC): ICD-10-CM

## 2021-04-20 DIAGNOSIS — F41.1 GAD (GENERALIZED ANXIETY DISORDER): ICD-10-CM

## 2021-04-20 DIAGNOSIS — F43.10 PTSD (POST-TRAUMATIC STRESS DISORDER): ICD-10-CM

## 2021-04-20 PROCEDURE — 99213 OFFICE O/P EST LOW 20 MIN: CPT | Performed by: PHYSICIAN ASSISTANT

## 2021-04-20 PROCEDURE — 1036F TOBACCO NON-USER: CPT | Performed by: PHYSICIAN ASSISTANT

## 2021-04-20 PROCEDURE — 3008F BODY MASS INDEX DOCD: CPT | Performed by: PHYSICIAN ASSISTANT

## 2021-04-20 RX ORDER — LISDEXAMFETAMINE DIMESYLATE 30 MG/1
50 CAPSULE ORAL DAILY
COMMUNITY
Start: 2021-02-10 | End: 2021-09-29 | Stop reason: SDUPTHER

## 2021-04-20 NOTE — PROGRESS NOTES
Assessment/Plan:    Problem List Items Addressed This Visit        Digestive    Esophageal reflux - Primary       Endocrine    Hypothyroidism       Respiratory    CAIO (obstructive sleep apnea)       Cardiovascular and Mediastinum    Chronic migraine without aura without status migrainosus, not intractable       Other    Obsessive-compulsive disorder    Relevant Medications    Vyvanse 30 MG capsule    Severe bipolar I disorder, most recent episode depressed (HCC)    Relevant Medications    Vyvanse 30 MG capsule    EILEEN (generalized anxiety disorder)    Relevant Medications    Vyvanse 30 MG capsule    PTSD (post-traumatic stress disorder)    Relevant Medications    Vyvanse 30 MG capsule           Diagnoses and all orders for this visit:    Gastroesophageal reflux disease without esophagitis    Acquired hypothyroidism    CAIO (obstructive sleep apnea)    Chronic migraine without aura without status migrainosus, not intractable    Mixed obsessional thoughts and acts    Severe bipolar I disorder, most recent episode depressed (HCC)    EILEEN (generalized anxiety disorder)    PTSD (post-traumatic stress disorder)    Other orders  -     Vyvanse 30 MG capsule; Take 30 mg by mouth daily        I encouraged Elizabeth to wear her CPAP as much as possible  Encouraged her to develop a routine with better sleep hygiene so that she is remembering to put her mask on before she goes to bed  She has a follow-up scheduled with sleep medicine  Encouraged her to try not to fixate on the number and how long she used it for  Encouraged her to lose weight  She will reschedule appointment with weight management  Encouraged her to follow-up with neurology for her chronic headaches and discuss breakthrough headaches  She will reschedule appointment with GI  She will also complete blood work    She will continue to follow with psych and weekly counseling  Subjective:      Patient ID: Jimmye Merlin is a 38 y o  female      [facility] is a 38year old female who is here today for a follow-up  She has been going through a lot over the past few months  Her father passed [facility] months ago [facility]  Her mother has been having a lot of mental health issues  She had to recently 302 her  She moved out of her mother's house with her dog  She is still seeing her psychiatrist on a monthly basis and sees her counselor on a weekly basis  She is doing well  He took her off her adderall and put her on vyvanse  She feels this is working better for her  She denies any suicidal or homicidal thoughts  She is complaining of ongoing headaches  She is following with neurology for this  She is getting botox, which is helping  However, she still has a headache about every 5 days  She is following with sleep medicine, but has trouble falling asleep without wearing her CPAP  She is fixated on the number of hours she uses her CPAP because they told her if she did not use it for 4 hours on average per night that they would take it away  She would rather not use it at all then have that hanging over her head  She does have an upcoming follow-up with sleep medicine  She was supposed to see weight management, GI, and get labs done  She did not complete any of this because she was busy and did not feel up to it  She does plan to reschedule these appointments  The following portions of the patient's history were reviewed and updated as appropriate:   She has a past medical history of Anorexia nervosa in remission, Anxiety, Back pain, Bipolar disorder ([facility] [address] ), Depression, Esophageal reflux (8/15/2013), Hypertension, Hypothyroid, Idiopathic intracranial hypertension, Lumbar degenerative disc disease (5/8/2014), Migraine, Obesity, Obsessive-compulsive disorder, Overactive bladder, Psychiatric disorder, Restless leg syndrome, controlled, Seasonal allergies, and Suicide and self-inflicted injury ([facility] [address] )  ,  does not have any pertinent problems on file  ,   has a past surgical history that includes Tonsillectomy; Sinus endoscopy; Dental surgery; and IR lumbar puncture (2/26/2019)  ,  family history includes Arthritis in her family; Breast cancer in her family and maternal grandmother; Cancer in her maternal grandfather, maternal grandmother, and paternal grandmother; Depression in her mother; Diabetes in her mother; Heart disease in her maternal grandmother; Hypertension in her brother, family, father, and mother; Hypothyroidism in her father; Mental illness in her father; Osteopenia in her family; Osteoporosis in her family; Other in her mother; Pneumonia in her maternal grandfather and paternal grandfather; Skin cancer in her maternal grandmother; Stroke in her maternal grandmother; Suicide Attempts in her mother; Thyroid disease in her father; Transient ischemic attack in her family  ,   reports that she has never smoked  She has never used smokeless tobacco  She reports previous alcohol use  She reports that she does not use drugs  ,  is allergic to doxycycline and other     Current Outpatient Medications   Medication Sig Dispense Refill    Botulinum Toxin Type A (Botox) 200 units SOLR Inject as directed every 3 (three) months      buPROPion (WELLBUTRIN XL) 300 mg 24 hr tablet Take 300 mg by mouth daily       Cholecalciferol (VITAMIN D3) 5000 units CAPS Take 5,000 Units by mouth daily      clomiPRAMINE (ANAFRANIL) 50 mg capsule Take 100 mg by mouth 2 (two) times a day      furosemide (LASIX) 40 mg tablet Take 1 5 tablets (60 mg total) by mouth daily 105 tablet 1    gabapentin (NEURONTIN) 600 MG tablet TAKE 1/2 TABLET DAILY IN AM AND 1 AND 1/2 TABLET AT BEDTIME      ketorolac (TORADOL) 10 mg tablet Take 1 tablet (10 mg total) by mouth 2 (two) times a day as needed for moderate pain (migraine headaches) 10 tablet 0    levothyroxine 125 mcg tablet TAKE 1 TABLET BY MOUTH EVERY DAY IN THE MORNING 90 tablet 0    linaCLOtide (Linzess) 290 MCG CAPS Take 1 capsule by mouth daily 30 capsule 5    LORazepam (ATIVAN) 1 mg tablet Take 1 mg by mouth 3 (three) times a day as needed for anxiety      MAGNESIUM PO Take 1 tablet by mouth daily      meloxicam (MOBIC) 7 5 mg tablet TAKE 1 AFTER BREAKFAST AND DINNER DAILY AS NEEDED FOR PAIN AND SCIATICA  60 tablet 0    Multiple Vitamin (MULTIVITAMIN) capsule Take 1 capsule by mouth daily      omeprazole (PriLOSEC) 20 mg delayed release capsule TAKE 1 CAPSULE BY MOUTH TWICE A  capsule 1    prochlorperazine (COMPAZINE) 10 mg tablet 1 tab at onset of migraine, can repeat in 8 hours, can take with triptan/NSAID 10 tablet 0    RESTASIS 0 05 % ophthalmic emulsion PLACE 1 DROP INTO BOTH EYES TWICE A DAY  3    rOPINIRole (REQUIP) 4 mg tablet TAKE 1 TABLET BY MOUTH TWICE A DAY (Patient taking differently: TAKE 1 TABLET BY MOUTH once daily) 60 tablet 5    sertraline (ZOLOFT) 100 mg tablet Take 200 mg by mouth daily  0    topiramate (TOPAMAX) 100 mg tablet Take 1 tablet (100 mg total) by mouth daily 30 tablet 2    Vraylar 6 MG capsule Take 6 mg by mouth daily daily      Vyvanse 30 MG capsule Take 30 mg by mouth daily       Current Facility-Administered Medications   Medication Dose Route Frequency Provider Last Rate Last Admin    Botulinum Toxin Type A SOLR 200 Units  200 Units Injection Q3 Months Arlet Doyle PA-C   200 Units at 04/01/21 1453       Review of Systems   Constitutional: Negative for chills, diaphoresis, fatigue and fever  HENT: Negative for congestion, ear pain, postnasal drip, rhinorrhea, sneezing, sore throat and trouble swallowing  Eyes: Negative for pain and visual disturbance  Respiratory: Negative for apnea, cough, shortness of breath and wheezing  Cardiovascular: Negative for chest pain and palpitations  Gastrointestinal: Negative for abdominal pain, constipation, diarrhea, nausea and vomiting  Genitourinary: Negative for dysuria and hematuria  Musculoskeletal: Negative for arthralgias, gait problem and myalgias  Neurological: Positive for headaches  Negative for dizziness, syncope, weakness, light-headedness and numbness  Psychiatric/Behavioral: Positive for decreased concentration  Negative for suicidal ideas  The patient is nervous/anxious  Objective:  Vitals:    04/20/21 1400   BP: 132/82   Pulse: 85   Temp: 98 4 °F (36 9 °C)   SpO2: 98%   Weight: (!) 192 kg (423 lb 6 4 oz)   Height: 5' 8" (1 727 m)     Body mass index is 64 38 kg/m²  Physical Exam  Vitals signs and nursing note reviewed  Constitutional:       Appearance: She is well-developed  She is obese  HENT:      Head: Normocephalic and atraumatic  Right Ear: Tympanic membrane, ear canal and external ear normal       Left Ear: Tympanic membrane, ear canal and external ear normal       Nose: Nose normal       Mouth/Throat:      Pharynx: No oropharyngeal exudate or posterior oropharyngeal erythema  Eyes:      Extraocular Movements: Extraocular movements intact  Neck:      Musculoskeletal: Normal range of motion and neck supple  Cardiovascular:      Rate and Rhythm: Normal rate and regular rhythm  Heart sounds: Normal heart sounds  No murmur  No friction rub  No gallop  Pulmonary:      Effort: Pulmonary effort is normal  No respiratory distress  Breath sounds: Normal breath sounds  No wheezing or rales  Musculoskeletal: Normal range of motion  Lymphadenopathy:      Cervical: No cervical adenopathy  Skin:     General: Skin is warm and dry  Neurological:      Mental Status: She is alert and oriented to person, place, and time  Psychiatric:         Mood and Affect: Mood is anxious  Thought Content: Thought content normal  Thought content does not include homicidal or suicidal ideation  Thought content does not include homicidal or suicidal plan  Judgment: Judgment normal          BMI Counseling: Body mass index is 64 38 kg/m²   The BMI is above normal  Nutrition recommendations include decreasing overall calorie intake, 3-5 servings of fruits/vegetables daily and consuming healthier snacks  Exercise recommendations include exercising 3-5 times per week  Patient referred to weight management due to patient being morbidly obese

## 2021-04-26 ENCOUNTER — TELEPHONE (OUTPATIENT)
Dept: NEUROLOGY | Facility: CLINIC | Age: 36
End: 2021-04-26

## 2021-04-26 NOTE — TELEPHONE ENCOUNTER
Pt left a voicemail message today at 12:18  HA not getting any better  She has been getting HA q other week lasting for 5 days  She has been seeing Jean Carlos Reyes and tried multiple meds but nothing seems to help  Call back # [phone] pt  States that she just getting over a migraine now  When she gets a migraine, her pain starts in the back of her neck and gets really tired  She cannot do much and not able to eat  During that time frame, she gets sensitivity to smell and light  Can be stress related per pt  States that she has h/o idiopathic intracranial htn  She takes lasix and topamax    Pt states that her migraine has been going on for about 3 years  Nothing seems to help    Current migraine medications are confirmed as:  Lasix 40 mg 1 5 daily  botox q 3 months  toradol 10 mg prn as prescribed-not effective  Compazine 10 mg prn as prescribed-not effective  topamax 100 mg 1 tab daily    Medications tried in the past? Not tried decadron, depakote or olanzapine bec she takes a lot of psych meds  Pt has upcoming appt w/ Arlet in July  Pt preferred to schedule f/u appt w/ one of our MD  States that she just wants second opinion  I don't see open slot w/ Dr Brittani Marquis, can you accommodate this pt to Dr Martha Wade schedule?            Ok to leave detailed message

## 2021-04-27 NOTE — TELEPHONE ENCOUNTER
I called patient and was able to schedule her with you on 5/17/21 at 9:40 am and it is a 40 minute appointment

## 2021-05-11 DIAGNOSIS — K21.9 GASTROESOPHAGEAL REFLUX DISEASE WITHOUT ESOPHAGITIS: ICD-10-CM

## 2021-05-11 RX ORDER — OMEPRAZOLE 20 MG/1
CAPSULE, DELAYED RELEASE ORAL
Qty: 180 CAPSULE | Refills: 1 | Status: SHIPPED | OUTPATIENT
Start: 2021-05-11 | End: 2021-11-02

## 2021-05-17 ENCOUNTER — OFFICE VISIT (OUTPATIENT)
Dept: NEUROLOGY | Facility: CLINIC | Age: 36
End: 2021-05-17
Payer: COMMERCIAL

## 2021-05-17 VITALS
HEART RATE: 101 BPM | DIASTOLIC BLOOD PRESSURE: 80 MMHG | HEIGHT: 68 IN | WEIGHT: 293 LBS | BODY MASS INDEX: 44.41 KG/M2 | SYSTOLIC BLOOD PRESSURE: 132 MMHG

## 2021-05-17 DIAGNOSIS — G44.229 CHRONIC TENSION-TYPE HEADACHE, NOT INTRACTABLE: ICD-10-CM

## 2021-05-17 DIAGNOSIS — M54.2 CERVICALGIA: ICD-10-CM

## 2021-05-17 DIAGNOSIS — G93.2 IDIOPATHIC INTRACRANIAL HYPERTENSION: ICD-10-CM

## 2021-05-17 DIAGNOSIS — G43.709 CHRONIC MIGRAINE WITHOUT AURA WITHOUT STATUS MIGRAINOSUS, NOT INTRACTABLE: Primary | ICD-10-CM

## 2021-05-17 PROCEDURE — 1036F TOBACCO NON-USER: CPT | Performed by: PSYCHIATRY & NEUROLOGY

## 2021-05-17 PROCEDURE — 99214 OFFICE O/P EST MOD 30 MIN: CPT | Performed by: PSYCHIATRY & NEUROLOGY

## 2021-05-17 PROCEDURE — 3008F BODY MASS INDEX DOCD: CPT | Performed by: PSYCHIATRY & NEUROLOGY

## 2021-05-17 RX ORDER — INDOMETHACIN 50 MG/1
50 CAPSULE ORAL
Qty: 90 CAPSULE | Refills: 3 | Status: SHIPPED | OUTPATIENT
Start: 2021-05-17 | End: 2021-06-07 | Stop reason: SDUPTHER

## 2021-05-17 NOTE — PATIENT INSTRUCTIONS
Chronic migraine complicated by idiopathic intracranial hypertension:  Elizabeth presents for follow-up evaluation with regard to her chronic migraine and idiopathic intracranial hypertension  She reports that her migraines have been somewhat less severe during her cycles with the addition of the Botox  She continues to take her other medication as prescribed  We did review several different options in the office today  I also personally reviewed her head CT which clearly shows evidence of a Chiari malformation including cerebellar  Tonsils crowding the medulla/upper cervical spine   - for ongoing migraine prevention we will plan to continue Botox every 90 days as currently scheduled  - in the future we may consider a calcitonin gene related peptide inhibitor  -for the time being we will initiate indomethacin 50 mg to be taken 3 times per day with meals  If she notes no benefit we may increase to 100 mg 3 times per day with meals  She should continue taking her omeprazole during this time  If she notices that she gets upset stomach or acid from the medication we will likely give her Carafate to help her to tolerated  If she finds this to be very beneficial we may consider treating with it on an intermittent basis only when she initiates a spike of headache  - I would like her to work to remain well hydrated drinking at least 36-48 oz of non caffeinated beverages per day in addition to any caffeine     - she should also keep track of her migraines using application on her phone      at this point we will proceed with the plan above  If she does not derive  Substantial benefit over the course of the next 3-4 weeks then we will strongly pursue an MRI considering that she clearly has crowding of her medulla on the noncontrast head CT  I would like for her to follow up by calling or sending a message to us in no more than 3 weeks to report on her progress and she will come back to see us in 4 months time

## 2021-05-17 NOTE — PROGRESS NOTES
Patient ID: Navi Urias is a 38 y o  female  Assessment/Plan:   Patient Instructions   Chronic migraine complicated by idiopathic intracranial hypertension:  Elizabeth presents for follow-up evaluation with regard to her chronic migraine and idiopathic intracranial hypertension  She reports that her migraines have been somewhat less severe during her cycles with the addition of the Botox  She continues to take her other medication as prescribed  We did review several different options in the office today  I also personally reviewed her head CT which clearly shows evidence of a Chiari malformation including cerebellar  Tonsils crowding the medulla/upper cervical spine   - for ongoing migraine prevention we will plan to continue Botox every 90 days as currently scheduled  - in the future we may consider a calcitonin gene related peptide inhibitor  -for the time being we will initiate indomethacin 50 mg to be taken 3 times per day with meals  If she notes no benefit we may increase to 100 mg 3 times per day with meals  She should continue taking her omeprazole during this time  If she notices that she gets upset stomach or acid from the medication we will likely give her Carafate to help her to tolerated  If she finds this to be very beneficial we may consider treating with it on an intermittent basis only when she initiates a spike of headache  - I would like her to work to remain well hydrated drinking at least 36-48 oz of non caffeinated beverages per day in addition to any caffeine     - she should also keep track of her migraines using application on her phone      at this point we will proceed with the plan above  If she does not derive  Substantial benefit over the course of the next 3-4 weeks then we will strongly pursue an MRI considering that she clearly has crowding of her medulla on the noncontrast head CT        I would like for her to follow up by calling or sending a message to us in no more than 3 weeks to report on her progress and she will come back to see us in 4 months time  No problem-specific Assessment & Plan notes found for this encounter  Diagnoses and all orders for this visit:    Chronic migraine without aura without status migrainosus, not intractable    Idiopathic intracranial hypertension  -     indomethacin (INDOCIN) 50 mg capsule; Take 1 capsule (50 mg total) by mouth 3 (three) times a day with meals    Chronic tension-type headache, not intractable    Cervicalgia           Subjective:    Elizabeth presents for follow-up with regard to her chronic migraine complicated by idiopathic intracranial hypertension  She was also recently seen in the emergency room and found to have a Chiari malformation  She reports that her migraines are similar in frequency currently to what they have been recently, in particular she will have 3-5 days of significant migraine with severe pain at the beginning and end of the cycle and brain fog/fatigue and other migraine symptoms in the middle  The symptoms have improved since starting therapy with Botox  We discussed several different options for treatment considering her extensive medication regimen  At this point we will begin a trial of indomethacin  She does not have ataxia per se however she does report that with her migraines she experiences significant polyuria causing 8 lb of weight loss per episode recently, 1 episode she recorded causing 20 lb of weight loss  This would suggest the possibility of fluctuations in intracranial pressure contributing and none suggest that the Chiari malformation should be examined further  If she does not find significant benefit from the indomethacin we will likely proceed with MRI, potentially with a flow study      PREVENTATIVE:  - magnesium, B2, vitamin-D  - Claritin  - lisinopril  - Wellbutrin, Zoloft, haloperidol, lithium, Latuda, Cogentin, Risperdal  - Topamax, gabapentin, Lamictal, diamox, cyclobenzabrine, tizanidine  ABORTIVE:  - Toradol, ibuprofen, diclofenac  - hydrocodone  - Ativan     Neck pain   When did the neck pain start? Somewhat improved since the last appt  Does your neck pain cause you to have headaches? Possibly      Past Medical History:   Diagnosis Date    Anorexia nervosa in remission     Anxiety     Back pain     Bipolar disorder (HCC)     Depression     Esophageal reflux 8/15/2013    Hypertension     Hypothyroid     Idiopathic intracranial hypertension     Lumbar degenerative disc disease 5/8/2014    Migraine     Obesity     Obsessive-compulsive disorder     Overactive bladder     Psychiatric disorder     Restless leg syndrome, controlled     Seasonal allergies     [facility] and [facility]        Social History     Socioeconomic History    Marital status: Single     Spouse name: Not on file    Number of children: Not on file    Years of education: Not on file    Highest education level: Not on file   Occupational History    Not on file   Social Needs    Financial resource strain: Not on file    Food insecurity     Worry: Never true     Inability: Never true    Transportation needs     Medical: No     Non-medical: No   Tobacco Use    Smoking status: Never Smoker    Smokeless tobacco: Never Used   Substance and Sexual Activity    Alcohol use: Not Currently    Drug use: No    Sexual activity: Not Currently     Partners: Female   Lifestyle    Physical activity     Days per week: Not on file     Minutes per session: Not on file    Stress: Not on file   Relationships    Social connections     Talks on phone: Not on file     Gets together: Not on file     Attends Taoism service: Not on file     Active member of club or organization: Not on file     Attends meetings of clubs or organizations: Not on file     Relationship status: Not on file    Intimate partner violence     Fear of current or ex partner: Not on file     Emotionally abused: Not on file     Physically abused: Not on file     Forced sexual activity: Not on file   Other Topics Concern    Not on file   Social History Narrative    Always uses seat belts    Caffeine use     Disabled - permament disability since 2008 due to psychiatric illness    Has no children    Single     Tea         Current Outpatient Medications:     Botulinum Toxin Type A (Botox) 200 units SOLR, Inject as directed every 3 (three) months, Disp: , Rfl:     buPROPion (WELLBUTRIN XL) 300 mg 24 hr tablet, Take 300 mg by mouth daily , Disp: , Rfl:     Cholecalciferol (VITAMIN D3) 5000 units CAPS, Take 5,000 Units by mouth daily, Disp: , Rfl:     clomiPRAMINE (ANAFRANIL) 50 mg capsule, Take 100 mg by mouth 2 (two) times a day, Disp: , Rfl:     furosemide (LASIX) 40 mg tablet, Take 1 5 tablets (60 mg total) by mouth daily, Disp: 105 tablet, Rfl: 1    gabapentin (NEURONTIN) 600 MG tablet, TAKE 1/2 TABLET DAILY IN AM AND 1 AND 1/2 TABLET AT BEDTIME, Disp: , Rfl:     ketorolac (TORADOL) 10 mg tablet, Take 1 tablet (10 mg total) by mouth 2 (two) times a day as needed for moderate pain (migraine headaches), Disp: 10 tablet, Rfl: 0    levothyroxine 125 mcg tablet, TAKE 1 TABLET BY MOUTH EVERY DAY IN THE MORNING, Disp: 90 tablet, Rfl: 0    linaCLOtide (Linzess) 290 MCG CAPS, Take 1 capsule by mouth daily, Disp: 30 capsule, Rfl: 5    LORazepam (ATIVAN) 1 mg tablet, Take 1 mg by mouth 3 (three) times a day as needed for anxiety, Disp: , Rfl:     MAGNESIUM PO, Take 1 tablet by mouth daily, Disp: , Rfl:     meloxicam (MOBIC) 7 5 mg tablet, TAKE 1 AFTER BREAKFAST AND DINNER DAILY AS NEEDED FOR PAIN AND SCIATICA , Disp: 60 tablet, Rfl: 0    Multiple Vitamin (MULTIVITAMIN) capsule, Take 1 capsule by mouth daily, Disp: , Rfl:     omeprazole (PriLOSEC) 20 mg delayed release capsule, TAKE 1 CAPSULE BY MOUTH TWICE A DAY, Disp: 180 capsule, Rfl: 1    prochlorperazine (COMPAZINE) 10 mg tablet, 1 tab at onset of migraine, can repeat in 8 hours, can take with triptan/NSAID, Disp: 10 tablet, Rfl: 0    RESTASIS 0 05 % ophthalmic emulsion, PLACE 1 DROP INTO BOTH EYES TWICE A DAY, Disp: , Rfl: 3    rOPINIRole (REQUIP) 4 mg tablet, TAKE 1 TABLET BY MOUTH TWICE A DAY (Patient taking differently: TAKE 1 TABLET BY MOUTH once daily), Disp: 60 tablet, Rfl: 5    sertraline (ZOLOFT) 100 mg tablet, Take 200 mg by mouth daily, Disp: , Rfl: 0    topiramate (TOPAMAX) 100 mg tablet, Take 1 tablet (100 mg total) by mouth daily, Disp: 30 tablet, Rfl: 2    Vraylar 6 MG capsule, Take 6 mg by mouth daily daily, Disp: , Rfl:     Vyvanse 30 MG capsule, Take 30 mg by mouth daily, Disp: , Rfl:     Current Facility-Administered Medications:     Botulinum Toxin Type A SOLR 200 Units, 200 Units, Injection, Q3 Months, Arlet Doyle PA-C, 200 Units at 04/01/21 1453    Allergies   Allergen Reactions    Doxycycline      Pt refuses d/t remote H/O intracranial hypertension     Other      Seasonal allergies            Objective:    /80 (BP Location: Right arm, Patient Position: Sitting, Cuff Size: Large)   Pulse 101   Ht 5' 8" (1 727 m)   Wt (!) 189 kg (416 lb 12 8 oz)   BMI 63 37 kg/m²       Physical Exam    Neurological Exam     At the time of my examination she was awake, alert, and in no distress  She has a reasonably good historian  There is no dysarthria  There are no obvious abnormalities of extraocular movement  Her funduscopic exam reveals no papilledema bilaterally  There is no clear tremor or ataxia and there is no lateralizing weakness  She is morbidly obese  ROS:    Review of Systems   Constitutional: Negative  Negative for appetite change and fever  HENT: Negative  Negative for hearing loss, tinnitus, trouble swallowing and voice change  Eyes: Positive for photophobia and pain  Respiratory: Negative  Negative for shortness of breath  Cardiovascular: Negative  Negative for palpitations     Gastrointestinal: Positive for nausea  Negative for vomiting  Endocrine: Negative  Negative for cold intolerance  Genitourinary: Negative  Negative for dysuria, frequency and urgency  Musculoskeletal: Positive for neck pain  Negative for myalgias  Skin: Negative  Negative for rash  Neurological: Positive for dizziness, facial asymmetry (Little bit), light-headedness and headaches  Negative for tremors, seizures, syncope, speech difficulty, weakness and numbness  Hematological: Negative  Does not bruise/bleed easily  Psychiatric/Behavioral: Negative  Negative for confusion, hallucinations and sleep disturbance  Reviewed ROS as entered by medical assistant

## 2021-05-25 ENCOUNTER — TELEPHONE (OUTPATIENT)
Dept: NEUROLOGY | Facility: CLINIC | Age: 36
End: 2021-05-25

## 2021-05-25 NOTE — TELEPHONE ENCOUNTER
Type Date User Summary Attachment   General 05/21/2021  3:36 PM Hailey Montelongo care coordination -   Note    Botox - authorization # [id number] - valid dates 3/1/2021 - 12/31/2021   Please use [facility]      Thanks  Drake Blackwood

## 2021-06-01 NOTE — TELEPHONE ENCOUNTER
[id number] [facility] [address] spoke with Alina, advised I was calling to initiate a refill for patient's botox medication  Alina was able to run a live test claim over the phone and order is ready to schedule for delivery, she also confirmed that patient's consent is on file  Botox is scheduled for delivery Tuesday 6/15/2021 to the [place] location via [address]   Please await patient's botox delivery and document once received, please advise if medication is not received by 2pm     Thanks  Drake Blackwood

## 2021-06-02 ENCOUNTER — TELEPHONE (OUTPATIENT)
Dept: NEUROLOGY | Facility: CLINIC | Age: 36
End: 2021-06-02

## 2021-06-02 DIAGNOSIS — G43.709 CHRONIC MIGRAINE WITHOUT AURA WITHOUT STATUS MIGRAINOSUS, NOT INTRACTABLE: Primary | ICD-10-CM

## 2021-06-02 DIAGNOSIS — G93.2 IDIOPATHIC INTRACRANIAL HYPERTENSION: ICD-10-CM

## 2021-06-02 RX ORDER — FREMANEZUMAB-VFRM 225 MG/1.5ML
225 INJECTION SUBCUTANEOUS
Qty: 1.5 ML | Refills: 6 | Status: SHIPPED | OUTPATIENT
Start: 2021-06-02 | End: 2021-09-24

## 2021-06-02 NOTE — TELEPHONE ENCOUNTER
----- Message from Kristy Kawasaki, MD sent at 6/2/2021 10:47 AM EDT -----  I just entered an RX for Elizabeth for Ajovy but she does get Botox  We will need to check for coverage/request the PA please    Thanks!    -Dr Linda Mayberry

## 2021-06-02 NOTE — TELEPHONE ENCOUNTER
----- Message from Amy Radha sent at 6/2/2021 10:59 AM EDT -----  Regarding: RE: Visit Follow-Up Question  Contact: [phone]  Thanks for getting back to me  My usual headaches pattern since July/August of 2020 has been a week on and a week off, so this wasn't really a change  That is why it was hard for me to know if it helped or not  However, I am willing to try anything you think will help  I will start taking the increased dose today and look forward to a CGRP inhibitor getting approval     Thank you again,  Celena Turner      ----- Message -----  From: Demar Mercado MD  Sent: 6/2/21, 10:46 AM  To: Amy Garcia  Subject: RE: Visit Follow-Up Question    Xander Johnson,    Thanks for getting back, that is super helpful  It sounds like a bit of a mixed bag    one decent week and one bad week  I can say that as we are treating things and trying to get a migraine syndrome under better control it is not uncommon that things will get better and then there will be flare ups (just like a fire dying out but flaring up as it goes)    While we were meeting I think that we said that if this was ineffective we would see if we can get a CGRP inhibitor approved for you  What I would suggest is this    Just for now I would like you to use what you have and icnrease the indocin to 100mg (2 caps) three times daily with meals and continue to monitor the symptoms  In the interim I will send the rx for a CGRP inhibitor over to the pharmacy and tell our prior auth team so that they can get on working out a PA for this  If the indocin does nothing or if you feel worse after one week at that dose we will get rid of it entirely  If it actually helps at that dose (entirely possible) we may keep it around while we work on the next step (the CGRP inhibitor)  Remember the CGRP inhibitor is the one that is an injection once per month (or 3 injections once per 3 months depending on the kind we use)    If you can't do an injection at home we can have you drop in to the office and one of our staff can help  Let me know what you think and please keep me in the loop, talk to you soon!    -Dr CALHOUN      ----- Message -----       From:Gita Medrano       Sent:6/1/2021 10:10 AM MARIA LUISA Marshall MD    Subject:Visit Follow-Up Question    Hello-    I was told to follow-up in 3 weeks by message or phone call  Although I think it's only 2 weeks, I am following-up now due to a question/possible issue with the new medication (Indomethacin)  I feel like it hasn't helped and could be making things worse  I had a good week with without major headache activity, but then had an all out headache week (actually 8 days)  The last 2 days of the headache the dizziness was terrible  It felt like overall this "headache process" was just going to never stop with the extreme fatigue, weird head feelings, and dizziness  There was fluid retention before this time, then loss during the 8 days  I hope all of this makes sense  Basically I didn't see any improvement, but I'm possibly seeing side effects  I hope we can figure out another plan      Thank you,  Linda Mahajan

## 2021-06-03 DIAGNOSIS — K59.09 CHRONIC CONSTIPATION: ICD-10-CM

## 2021-06-03 RX ORDER — LINACLOTIDE 290 UG/1
CAPSULE, GELATIN COATED ORAL
Qty: 30 CAPSULE | Refills: 5 | Status: SHIPPED | OUTPATIENT
Start: 2021-06-03 | End: 2021-11-22

## 2021-06-03 NOTE — TELEPHONE ENCOUNTER
Received call from William at [facility]  States she faxed over clinical questions  Needs these completed before 3 pm tomorrow  I advised she fax this to [phone] then to be addressed ASAP  She verbalizes understanding  States she will fax now  Will await fax and forward to team 5 once received

## 2021-06-03 NOTE — TELEPHONE ENCOUNTER
Peter Duarte called back in to ask if we received the fax  I confirmed that we did  She has no other questions or concerns at this time

## 2021-06-03 NOTE — TELEPHONE ENCOUNTER
I updated #9 on form and signed my name on behalf of dr Rossy Beebe    Form, last office note and encounter from 6/2 faxed to yuriy

## 2021-06-03 NOTE — TELEPHONE ENCOUNTER
I saw the form, please email to me and I can sign  Looking at the form, the answer to question 9 should be yes    she has documentation of therapeutic failure with greater than 6 months of Botox without a CGRP inhibitor

## 2021-06-04 ENCOUNTER — TELEPHONE (OUTPATIENT)
Dept: SURGERY | Facility: CLINIC | Age: 36
End: 2021-06-04

## 2021-06-04 ENCOUNTER — PREP FOR PROCEDURE (OUTPATIENT)
Dept: SURGERY | Facility: CLINIC | Age: 36
End: 2021-06-04

## 2021-06-04 DIAGNOSIS — G25.81 RESTLESS LEG SYNDROME: ICD-10-CM

## 2021-06-04 DIAGNOSIS — K21.9 GASTROESOPHAGEAL REFLUX DISEASE WITHOUT ESOPHAGITIS: Primary | ICD-10-CM

## 2021-06-04 RX ORDER — ROPINIROLE 4 MG/1
TABLET, FILM COATED ORAL
Qty: 180 TABLET | Refills: 1 | Status: SHIPPED | OUTPATIENT
Start: 2021-06-04 | End: 2022-03-19 | Stop reason: SDUPTHER

## 2021-06-04 NOTE — TELEPHONE ENCOUNTER
----- Message from Vahid Carey sent at 6/3/2021  1:31 PM EDT -----  Regarding: please schedule  Patient called the office to reschedule her Colonoscopy, her father had passed away and is now taking care of the mom  She would like the Date of 7/30/2021 with Dr Do  She has all the directions and expressed understanding

## 2021-06-07 ENCOUNTER — TELEPHONE (OUTPATIENT)
Dept: NEUROLOGY | Facility: CLINIC | Age: 36
End: 2021-06-07

## 2021-06-07 RX ORDER — INDOMETHACIN 50 MG/1
100 CAPSULE ORAL
Qty: 180 CAPSULE | Refills: 3 | Status: SHIPPED | OUTPATIENT
Start: 2021-06-07 | End: 2021-09-24

## 2021-06-07 NOTE — TELEPHONE ENCOUNTER
Called yuriy at [phone], received message experiencing difficulties please call back    Attempted to call again and received same message    Will try again later

## 2021-06-07 NOTE — TELEPHONE ENCOUNTER
Indomethacin PA completed on [facility]  Key-[id number]    Please see other encounter from today regarding ajovy

## 2021-06-07 NOTE — TELEPHONE ENCOUNTER
Pt called w/ update  States that her indomethacin was increased to 50 mg 2 caps (100 mg) tid which helped tremendously  States that her insurance did not cover 50 mg tabs  She is unsure if this requires PA  Chart reviewed: Last script was sent to [facility] pharmacy (50 mg) quantity 90  Called [facility] pharmacy and states that indomethacin in non formulary  Requires PA    Pt is requesting updated script be sent to [facility] pharmacy in [facility] ([phone])  If PA is denied  She will use goodrx ginny Scales [facility] [id number]  [facility] [id number]  Group [id number]  Id [id number]  [phone]    Rx entered   Pls review and sign off      thanks            [phone] ok to leave detailed message

## 2021-06-07 NOTE — TELEPHONE ENCOUNTER
Marytoña Black  Sounds great  I signed the new rx and also entered an rx for a BMP for 2 weeks from now to be sure her kidneys are handling it ok  She should remain well hydrated while on this medication and if she notices it start to hurt her stomach she should let us know (should continue to take it with food  Chevis Grave and Mary Black    It looks like Emgality and Aimovig would be covered and just not Ajovy    Lets hold off for now and if the relief on the Indomethacin is not sustained or if she can't tolerate it we will submit for Emgality  She should continue to track headaches in the interim  We will plan for a 2-3 month course and then as long as she is doing well will start to wean down  She should call us back in no more than 8 weeks to report on her progress and we can consider adjustments from there, but this will not be a long term medication if we can avoid it

## 2021-06-07 NOTE — TELEPHONE ENCOUNTER
Mike Alvarado # [id number]  Dr Ann Guillermo  They are faxing denial letter  Insurance called to advise Andrew Samaniego was denied  Reason:    Has not tried other drugs  that are covered on formulary:  emgality, aimovig, metoprolol (require PA) propronalol, timolol,atenolol, nadolol, divalproez, amitriptyline, venlafaxine, valproate(do not require PA)    Medical documentation of failure or intolerance to emgality or aimovig and do  not see documentation that ajovy will not be used with botox  need 3 months trial of at least one CGRP without botox or 6 months failure with botox  Dr Ann Guillermo, do you want to order med on formulary instead (emgality or aimovig)  Thank you

## 2021-06-08 DIAGNOSIS — E03.9 HYPOTHYROIDISM, UNSPECIFIED TYPE: ICD-10-CM

## 2021-06-08 RX ORDER — LEVOTHYROXINE SODIUM 0.12 MG/1
TABLET ORAL
Qty: 90 TABLET | Refills: 0 | Status: SHIPPED | OUTPATIENT
Start: 2021-06-08 | End: 2021-10-08 | Stop reason: SDUPTHER

## 2021-06-08 NOTE — TELEPHONE ENCOUNTER
Maury Ramírez from [facility] called back again asking if we got the fax,  Informed her we did not  I asked her to fax this again to [phone]

## 2021-06-08 NOTE — TELEPHONE ENCOUNTER
Samanta Easley from [facility] called in asking for additional office notes and documentation  States she also had faxed additional clinical questions  I informed her that there is no fax in media and I requested she fax clinical questions, etc to [phone]  Will await fax

## 2021-06-08 NOTE — TELEPHONE ENCOUNTER
Reached vm, left detailed message on recommendation and status of ajovy/indomethicin  We will call her with outcome of indomethicin auth  Please call back in the meantime with any questions/concerns

## 2021-06-08 NOTE — TELEPHONE ENCOUNTER
Per Dr Radha Brown (see other task)    Hannah Gabriel and Dhiraj Burton    It looks like Emgality and Aimovig would be covered and just not Ajovy    Lets hold off for now and if the relief on the Indomethacin is not sustained or if she can't tolerate it we will submit for Emgality

## 2021-06-09 ENCOUNTER — TELEPHONE (OUTPATIENT)
Dept: NEUROLOGY | Facility: CLINIC | Age: 36
End: 2021-06-09

## 2021-06-09 NOTE — TELEPHONE ENCOUNTER
Received call from sameer yan to clarify diagnosis for indomethicin    Advised chronic migraine and IIH

## 2021-06-10 ENCOUNTER — TELEPHONE (OUTPATIENT)
Dept: NEUROLOGY | Facility: CLINIC | Age: 36
End: 2021-06-10

## 2021-06-10 NOTE — TELEPHONE ENCOUNTER
Patient called in to report how she was doing on Indomethacin  States she continues to have headaches for a week on and then a week off  States this past week has been one of the most severe headaches  When did migraine start? 6/7  Location/Description: Pain located in eyes/around eyes and pain in the back of the neck  After this starts, she reports a feeling of lightheadedness (no room spinning)  States typically she will get these pains, lightheaded, and feel fatigued for 2-3 days with less headache pain throughout the days  Pain scale: right now 6/10  Associated symptoms: lightheaded, no room spinning  Photophobia  No phonophobia  Is nauseas but no vomiting  Blurred vision  Precipitating factors: no  Alleviating factors: sleep  What medications have you tried for this migraine headache? Indomethacin 50 mg 2 tabs 3 times a day  *Also taking linzess and has been having stomach discomfort / cramping since started the indomethacin with this - is wondering if this is related? States she has been on Linzess for a few months now  Medications tried in the past? No steroids used in the past  Would like to avoid steroids       Preferred pharm [facility] [place]     Best call back [phone], okay with detailed messages

## 2021-06-10 NOTE — TELEPHONE ENCOUNTER
Called and advised pt of all of the below  She verbalized clear understanding  States that indomethacin is somewhat effective, slightly effective  Things are not any better  She is agreeable to try depakote and emgality  However she is on 4 psych meds (wellbutrin, zoloft, vraylar and vyvanse)  If these meds does not have interact w/ depakote then she would be agreeable to try this w/ emgality

## 2021-06-10 NOTE — TELEPHONE ENCOUNTER
I do not see any clear interaction between the Linzess and Indocin  It is not impossible that they are related but it certainly is possible that the indomethacin is causing stomach cramping/discomfort  I am just a little confused, didn't she send a message recently that she was dong much better after the increased dose of the Indocin    Are things any better/less frequent/less severe on this dose or did she just think she was dong better? We can go ahead and try the Emgality (if I recall we were thinking that this would be approved but felt like she was dong better on the Indocin)  Another option would be to do a 3-4 day Depakote bridge (use Depakote at bedtime for 3-4 days only to try and break this current cycle)  Or we can do both going on the premise that it may take a little time to get the [facility] approved  Let me know

## 2021-06-11 DIAGNOSIS — G43.709 CHRONIC MIGRAINE WITHOUT AURA WITHOUT STATUS MIGRAINOSUS, NOT INTRACTABLE: Primary | ICD-10-CM

## 2021-06-11 RX ORDER — DIVALPROEX SODIUM 500 MG/1
TABLET, EXTENDED RELEASE ORAL
Qty: 6 TABLET | Refills: 0 | Status: SHIPPED | OUTPATIENT
Start: 2021-06-11 | End: 2021-10-16

## 2021-06-11 NOTE — PROGRESS NOTES
Chief Complaint   Patient presents with    Sinus Problem     possible sinus infection     Assessment/Plan:    Acute sinusitis- to start Doxycycline 100 mg one tab BID x 7 days  Advised to take a probiotic while on this abx as this is a second course  Increase PO fluids and rest   Warm, salt-water gargles, throat lozenges  OTC Tylenol prn, max of 3 g/24 hours  Saline nasal spray OTC prn  Call office if symptoms worsen or persist      Diagnoses and all orders for this visit:    Acute non-recurrent maxillary sinusitis  -     doxycycline monohydrate (MONODOX) 100 mg capsule; Take 1 capsule (100 mg total) by mouth 2 (two) times a day for 7 days    Other orders  -     lamoTRIgine (LaMICtal) 100 mg tablet; Take 50 mg by mouth daily Take 1/2 a pill daily          Subjective:      Patient ID: Grzegorz Vera is a 28 y o  female  HPI     Pt presents by herself today for an acute visit     Pt notes she was evaluated at [facility] about 2 weeks ago and was started on Augmentin for acute sinusitis  Reports she had mild symptom improvement with this abx, but symptoms returned a few days after completion     C/o increased sinus pressure   +right ear pain   No ea drainage, tinnitus or muffled sounds  +nasal congestion   +sore throat and PND  No cough, fevers, chills, N/V/D, appetite is normal   No SOB or wheezing     Non smoker  Denies H/O asthma or COPD    Pt notes she had sinus surgery many years ago for recurrent sinus infections- hasn't seen ENT in years    The following portions of the patient's history were reviewed and updated as appropriate: allergies, current medications, past medical history, past social history and problem list     Review of Systems   Constitutional: Positive for fatigue  Negative for activity change, appetite change, chills, diaphoresis, fever and unexpected weight change  HENT: Positive for congestion, ear pain (right ear), postnasal drip, sinus pressure and sore throat   Negative for ear PT called back and stated that she uses this medication for flare ups, her lat RX was in 2018, and she only uses it on a PRN basis.  She would like a refill.  Thank you!  Ceci   discharge, hearing loss, rhinorrhea, tinnitus, trouble swallowing and voice change  Eyes: Negative for discharge, itching and visual disturbance  Respiratory: Negative for cough, chest tightness, shortness of breath and wheezing  Cardiovascular: Negative for chest pain, palpitations and leg swelling  Gastrointestinal: Negative for abdominal pain, constipation, diarrhea and vomiting  Musculoskeletal: Negative for arthralgias, joint swelling and myalgias  Neurological: Negative for dizziness and headaches  Hematological: Negative for adenopathy  Does not bruise/bleed easily  Objective:      /76   Pulse 72   Temp 98 2 °F (36 8 °C) (Tympanic)   Resp 16   LMP 08/30/2018          Physical Exam   Constitutional: She appears well-developed and well-nourished  No distress  HENT:   Head: Normocephalic and atraumatic  Right Ear: Hearing, tympanic membrane, external ear and ear canal normal    Left Ear: Hearing, tympanic membrane, external ear and ear canal normal    Nose: Mucosal edema (erythematous nares) present  Right sinus exhibits maxillary sinus tenderness  Left sinus exhibits maxillary sinus tenderness  Mouth/Throat: Mucous membranes are normal  Posterior oropharyngeal erythema present  No oropharyngeal exudate  Eyes: Pupils are equal, round, and reactive to light  Conjunctivae are normal    Neck: Normal range of motion  Neck supple  Cardiovascular: Normal rate, regular rhythm and normal heart sounds  No murmur heard  Pulmonary/Chest: Effort normal and breath sounds normal  No respiratory distress  She has no wheezes  She has no rales  She exhibits no tenderness  Musculoskeletal: Normal range of motion  Lymphadenopathy:     She has no cervical adenopathy  Neurological: She is alert  Skin: Skin is warm and dry  No rash noted  She is not diaphoretic  No erythema  Psychiatric: She has a normal mood and affect

## 2021-06-11 NOTE — TELEPHONE ENCOUNTER
Called and advised pt of all of the below  She verbalized clear understanding of all instructions and agreeable    ottoniel [id number]  [facility] [id number]  Group [id number]  Id [id number]    Emgality PA initiated on [facility]  Key# [id number]   Awaiting determination

## 2021-06-11 NOTE — TELEPHONE ENCOUNTER
Ok   I am sure that the [facility] will need a PA so we will need to pursue that  The Depakote would potentially interact but more so if we were using it for seizures or if she was taking it for a long time    for a very short course like this I would not expect any interaction (although she might feel a little more tired/dizzy    but I doubt it)  She can take 1 dose of the Depakote at night for 4-5 nights max  Please see RX's

## 2021-06-14 NOTE — TELEPHONE ENCOUNTER
Patient called in asking if there are any drug interactions with her other meds and indomethacin  I reviewed Dr Beth Olea note and reviewed drug-to-drug interactions between Depakote and indomethacin via micromedex (there is reportedly no interaction between the two per that resource)  She verbalizes understanding  States she is feeling better and is pleased with the Depakote  She asked for an update regarding Emgality - I looked through the chart and do not see any approval / denial letter  I informed her that once we are notified by insurance, we will call her  She verbalizes understanding and is agreeable to this plan

## 2021-06-15 NOTE — TELEPHONE ENCOUNTER
Ellie Zelaya from [facility] called to get additional info  All questions answered  Awaiting determination

## 2021-06-15 NOTE — TELEPHONE ENCOUNTER
Rosendo/Jenn from [facility] called asking if pt will be using emgality in combination w/ botox  Advised per office notes dated for ongoing migraine prevention we will plan to continue Botox every 90 days as currently scheduled   Also advised info on enc dated 6/7/21    Awaiting determination

## 2021-06-15 NOTE — TELEPHONE ENCOUNTER
indomethacin denied  do not see medical record documentation of use for a food and drug administration approved indication    Patient aware, she will use good rx coupon card

## 2021-06-16 NOTE — TELEPHONE ENCOUNTER
Eneida Helms  Botox number of units: 200   Botox quantity: 1   Arrived at what location: [facility]   Lot number: [id number]   Expiration Date: 03/2024   Appt notes indicate correct medication: yes

## 2021-06-17 NOTE — TELEPHONE ENCOUNTER
Received call from [facility] with [facility] notifying us of approval  She is faxing the letter now to the office  States that she tried to notify the pharmacy but was unsuccessful  Called [facility], spoke with [facility] and notified her of approval  She confirmed that they will fill the med for the patient  Called patient and notified her of the above  She plans to still take indomethacin at this time  Asking if this should be discontinued?

## 2021-06-19 NOTE — PATIENT INSTRUCTIONS
Pap every 5 years if normal-due 2023, sexually transmitted infection testing as indicated-declined, exercise most days of week, obtain appropriate diet and hydration, Calcium 1000mg + 600 vit D daily, Check IUD string monthly after menses  HPV 9 vaccine recommended through age 39  Check with your insurance for coverage  If covered, call office to schedule start of vaccine series    Annual mammogram starting at age 36, monthly breast self exam  No

## 2021-06-21 NOTE — TELEPHONE ENCOUNTER
I would plan to continue for 2-4 weeks following the first Emgality injection and keep track of the frequency of the migraines and any side effects  At that time I would like her to call us and we can figure out if we want to continue (ideally would not like to keep the Indocin long term)

## 2021-06-26 ENCOUNTER — LAB (OUTPATIENT)
Dept: LAB | Facility: MEDICAL CENTER | Age: 36
End: 2021-06-26
Payer: COMMERCIAL

## 2021-06-26 DIAGNOSIS — Z00.00 MEDICARE ANNUAL WELLNESS VISIT, SUBSEQUENT: ICD-10-CM

## 2021-06-26 DIAGNOSIS — G93.2 IDIOPATHIC INTRACRANIAL HYPERTENSION: ICD-10-CM

## 2021-06-26 DIAGNOSIS — K59.09 CHRONIC CONSTIPATION: ICD-10-CM

## 2021-06-26 DIAGNOSIS — R73.01 ELEVATED FASTING GLUCOSE: ICD-10-CM

## 2021-06-26 DIAGNOSIS — Z13.220 SCREENING FOR HYPERLIPIDEMIA: ICD-10-CM

## 2021-06-26 LAB
ALBUMIN SERPL BCP-MCNC: 3.8 G/DL (ref 3.5–5)
ALP SERPL-CCNC: 121 U/L (ref 46–116)
ALT SERPL W P-5'-P-CCNC: 30 U/L (ref 12–78)
ANION GAP SERPL CALCULATED.3IONS-SCNC: 6 MMOL/L (ref 4–13)
AST SERPL W P-5'-P-CCNC: 13 U/L (ref 5–45)
BILIRUB SERPL-MCNC: 0.42 MG/DL (ref 0.2–1)
BUN SERPL-MCNC: 25 MG/DL (ref 5–25)
CALCIUM SERPL-MCNC: 9.5 MG/DL (ref 8.3–10.1)
CHLORIDE SERPL-SCNC: 105 MMOL/L (ref 100–108)
CHOLEST SERPL-MCNC: 212 MG/DL (ref 50–200)
CO2 SERPL-SCNC: 27 MMOL/L (ref 21–32)
CREAT SERPL-MCNC: 0.88 MG/DL (ref 0.6–1.3)
EST. AVERAGE GLUCOSE BLD GHB EST-MCNC: 126 MG/DL
GFR SERPL CREATININE-BSD FRML MDRD: 85 ML/MIN/1.73SQ M
GLUCOSE P FAST SERPL-MCNC: 117 MG/DL (ref 65–99)
HBA1C MFR BLD: 6 %
HDLC SERPL-MCNC: 59 MG/DL
LDLC SERPL CALC-MCNC: 133 MG/DL (ref 0–100)
NONHDLC SERPL-MCNC: 153 MG/DL
POTASSIUM SERPL-SCNC: 3.7 MMOL/L (ref 3.5–5.3)
PROT SERPL-MCNC: 7.7 G/DL (ref 6.4–8.2)
SODIUM SERPL-SCNC: 138 MMOL/L (ref 136–145)
TRIGL SERPL-MCNC: 101 MG/DL
TSH SERPL DL<=0.05 MIU/L-ACNC: 2.74 UIU/ML (ref 0.36–3.74)

## 2021-06-26 PROCEDURE — 80061 LIPID PANEL: CPT

## 2021-06-26 PROCEDURE — 36415 COLL VENOUS BLD VENIPUNCTURE: CPT

## 2021-06-26 PROCEDURE — 80053 COMPREHEN METABOLIC PANEL: CPT

## 2021-06-26 PROCEDURE — 83036 HEMOGLOBIN GLYCOSYLATED A1C: CPT

## 2021-06-26 PROCEDURE — 84443 ASSAY THYROID STIM HORMONE: CPT

## 2021-06-29 DIAGNOSIS — G43.719 INTRACTABLE CHRONIC MIGRAINE WITHOUT AURA AND WITHOUT STATUS MIGRAINOSUS: ICD-10-CM

## 2021-06-29 RX ORDER — TOPIRAMATE 100 MG/1
100 TABLET, FILM COATED ORAL DAILY
Qty: 90 TABLET | Refills: 3
Start: 2021-06-29 | End: 2022-06-13

## 2021-06-29 NOTE — TELEPHONE ENCOUNTER
Called and left a detailed message on pt's answering machine of the below and for a call back for further questions or concerns

## 2021-06-29 NOTE — TELEPHONE ENCOUNTER
Patient called in with 2 requests  1  Requires refill of topamax 100 mg in the morning to the [facility] in [place]  Script entered for review and signature  2  States she is taking indomethacin, but has not been as compliant for the past 5 days  Reports she has not been able to eat as much due to extreme heat lately (~90F), so she has not been taking this med as prescribed (sig for 2 caps three times a day with meals)  She wanted to make Dr William Arrieta aware  States she is not sure if this is okay or not  Best call [phone], okay with detailed messages

## 2021-07-01 NOTE — TELEPHONE ENCOUNTER
Received call from patient  States she is having difficulty getting her Topamax  Connected to [facility] pharmacy, spoke with [facility]  States they did not receive this script  I called this in as a verbal  They will fill for patient  I let the patient know, she verbalizes understanding  She states she is taking indomethacin 2 tabs twice a day  States this is working well for her and will continue to take the medication this way  She will call back if headaches worsen  She has no other questions or concerns at this time

## 2021-07-13 ENCOUNTER — TELEPHONE (OUTPATIENT)
Dept: NEUROLOGY | Facility: CLINIC | Age: 36
End: 2021-07-13

## 2021-07-13 NOTE — TELEPHONE ENCOUNTER
Patient called in to say that she has a headache up the back of her neck with pain in/around bilateral eyes and frontal sinuses  Pain is 7/10  Started today  Associated symptoms:light photophobia and phonophobia   Alleviating factors: laying down    States this is the first headache like this that she has had since starting Emgality on 6/19  Notes that she used to get these kinds of headaches every other week that would last for about 5 days before starting Emgality  Also taking indomethacin 100 mg twice daily (please see prior encounter about this), Topamax 100 mg in the morning  No OTC meds being used  Patient worried about Botox tomorrow and asked if still okay for injections since she has a headache? Scheduled 7/14 at 1:40 pm     Best call back [phone], okay with detailed messages

## 2021-07-14 ENCOUNTER — TELEPHONE (OUTPATIENT)
Dept: NEUROLOGY | Facility: CLINIC | Age: 36
End: 2021-07-14

## 2021-07-14 NOTE — TELEPHONE ENCOUNTER
Called patient, read Arlet's message  Patient verbalizes understanding of information  Reports they do not have any further questions or concerns at this time

## 2021-07-14 NOTE — TELEPHONE ENCOUNTER
We have many patients that do get Botox when they have headaches  Some report that it is more painful to get the injections when they have a headache  I would still recommend that she have the injections done

## 2021-07-14 NOTE — TELEPHONE ENCOUNTER
Pt called and states that she lives 1 hour and 25 min  When she gets a migraine, she gets extremely drowsy  She will not be able to make it to her botox appt today at 1:30  She does not have anyone to drive her  Requesting botox appt be rescheduled  Ulysses Maltese made aware  Call transferred to ext [phone]

## 2021-07-14 NOTE — TELEPHONE ENCOUNTER
Patient called and stated that she can make her appointment today with Niecy Palma due to no feeling well to drive in  Patient ask for a sooner appointment with Dr Bhatia so I offer appointment in [facility] on 7/28/2021 at 11:30AM  Shireen Jimenes can you please schedule patient in [facility] with Dr Bhatia on 7/28/2021 for botox at 11:30 thanks a lot

## 2021-07-15 NOTE — TELEPHONE ENCOUNTER
Benito Afternoon Asuncion,   Please coordinate delivery of patient's botox medication to the [facility] location  Medication was previously delivered to [facility] on 6/15/21  Please document medication transport in this encounter      Thanks  Carlton Gallardo

## 2021-07-15 NOTE — TELEPHONE ENCOUNTER
New referral has been attached      Type Date User Summary Attachment   General 07/15/2021  2:34 PM [facility] care coordination -   Note    Botox 200 units - authorization # [id number] - valid dates 3/1/2021 - 12/31/2021   [facility]      Thanks  Abraham Novoa

## 2021-07-20 NOTE — TELEPHONE ENCOUNTER
Botox number of units: 200 units  Botox quantity: 1  Arrived at what location: [facility]  Botox at Correct Administering Location: YES  NDC number: 3275-8825-87  Lot number: [id number]  Expiration Date: 03/2024  Appt notes indicate correct medication: YES    Botox has been received, logged, and placed in fridge

## 2021-07-20 NOTE — TELEPHONE ENCOUNTER
I called for  please have Botox ready for pick today going to [facility] 3rd floor thanks

## 2021-07-21 ENCOUNTER — TELEPHONE (OUTPATIENT)
Dept: GASTROENTEROLOGY | Facility: CLINIC | Age: 36
End: 2021-07-21

## 2021-07-28 ENCOUNTER — PROCEDURE VISIT (OUTPATIENT)
Dept: NEUROLOGY | Facility: CLINIC | Age: 36
End: 2021-07-28
Payer: COMMERCIAL

## 2021-07-28 VITALS — HEART RATE: 95 BPM | SYSTOLIC BLOOD PRESSURE: 130 MMHG | DIASTOLIC BLOOD PRESSURE: 90 MMHG

## 2021-07-28 DIAGNOSIS — G43.709 CHRONIC MIGRAINE WITHOUT AURA WITHOUT STATUS MIGRAINOSUS, NOT INTRACTABLE: Primary | ICD-10-CM

## 2021-07-28 PROCEDURE — 64615 CHEMODENERV MUSC MIGRAINE: CPT | Performed by: PSYCHIATRY & NEUROLOGY

## 2021-07-28 NOTE — PROGRESS NOTES
Chemodenervation     Date/Time 7/28/2021 11:46 AM     Performed by  Georgiana Pena MD     Authorized by Georgiana Pena MD        Pre-procedure details      Prepped With: Alcohol     Anesthesia  (see MAR for exact dosages): Anesthesia method:  None   Botox     Botox Type:  Type A    Brand:  Botox    mL's of Botulinum Toxin:  200    Medication Administration:  100 Units onabotulinumtoxin A 100 units   Procedures     Botox Procedures: chronic headache      Indications: migraines     Injection Location      Head / Face:  L frontalis, R frontalis, R , L , L inferior cervical paraspinal, R inferior cervical paraspinal, L superior cervical paraspinal, R superior cervical paraspinal, procerus, L inferior occipitalis, R inferior occipitalis, L inferior trapezius, R inferior trapezius, L temporalis and R temporalis    L  injection amount:  5 unit(s)    R  injection amount:  5 unit(s)    L lateral frontalis:  5 unit(s)    R lateral frontalis:  5 unit(s)    L medial frontalis:  5 unit(s)    R medial frontalis:  5 unit(s)    L temporalis injection amount:  20 unit(s)    R temporalis injection amount:  20 unit(s)    Procerus injection amount:  5 unit(s)    L inferior occipitalis injection amount:  15 unit(s)    R inferior occipitalis injection amount:  15 unit(s)    L inferior cervical paraspinal injection amount:  5 unit(s)    R inferior cervical paraspinal injection amount:  5 unit(s)    L superior cervical paraspinal injection amount:  5 unit(s)    R superior cervical paraspinal injection amount:  5 unit(s)    L inferior trapezius injection amount:  15 unit(s)    R inferior trapezius injection amount:  15 unit(s)   Total Units     Total units used:  200    Total units discarded:  0   Post-procedure details      Chemodenervation:  Chronic migraine    Facial Nerve Location[de-identified]  Bilateral facial nerve    Patient tolerance of procedure:   Tolerated well, no immediate complications Comments      Extra 45 units were given in the 20 units extra in R superior cervical and 20 units in the L inferior cervical paraspinal (right and left) and 5 extra units in the R occipital region because medically necessary

## 2021-08-09 ENCOUNTER — NURSE TRIAGE (OUTPATIENT)
Dept: OTHER | Facility: OTHER | Age: 36
End: 2021-08-09

## 2021-08-09 DIAGNOSIS — K64.8 OTHER HEMORRHOIDS: Primary | ICD-10-CM

## 2021-08-09 RX ORDER — HYDROCORTISONE ACETATE 25 MG/1
25 SUPPOSITORY RECTAL 2 TIMES DAILY
Qty: 12 SUPPOSITORY | Refills: 0 | Status: SHIPPED | OUTPATIENT
Start: 2021-08-09 | End: 2021-08-09 | Stop reason: SDUPTHER

## 2021-08-09 RX ORDER — HYDROCORTISONE ACETATE 25 MG/1
25 SUPPOSITORY RECTAL 2 TIMES DAILY
Qty: 12 SUPPOSITORY | Refills: 0 | Status: SHIPPED | OUTPATIENT
Start: 2021-08-09 | End: 2022-03-22

## 2021-08-09 NOTE — TELEPHONE ENCOUNTER
Patient called in for blood in stools  Per protocol, should be seen in office within next 24 hours, however Dr Bettina Conner does not have anything available  I was unable to schedule an appointment for her to be seen, but hopefully another physician has availability  Please assist with scheduling  Reason for Disposition   MODERATE rectal bleeding (small blood clots, passing blood without stool, or toilet water turns red)    Answer Assessment - Initial Assessment Questions  1  APPEARANCE of BLOOD: "What color is it?" "Is it passed separately, on the surface of the stool, or mixed in with the stool?"       Bright red blood clots, on surface and in stool     2  AMOUNT: "How much blood was passed?"       Reports can tell blood dripped in the toilet, toilet paper has some bright red blood when wiping but not a large amount     3  FREQUENCY: "How many times has blood been passed with the stools? "       4-5 times since Friday, mostly with first morning BM    4  ONSET: "When was the blood first seen in the stools?" (Days or weeks)       Started on Friday     5  DIARRHEA: "Is there also some diarrhea?" If Yes, ask: "How many diarrhea stools were passed in past 24 hours?"       Denies     6  CONSTIPATION: "Do you have constipation?" If Yes, ask: "How bad is it?"      Yes, has chronic constipation takes miralax and linzess    7  RECURRENT SYMPTOMS: "Have you had blood in your stools before?" If Yes, ask: "When was the last time?" and "What happened that time?"       Denies     8  BLOOD THINNERS: "Do you take any blood thinners?" (e g , Coumadin/warfarin, Pradaxa/dabigatran, aspirin)      Denies     9   OTHER SYMPTOMS: "Do you have any other symptoms?"  (e g , abdominal pain, vomiting, dizziness, fever)      Abdominal cramping but states thinks it is from taking miralax and with constipation     Was supposed to have a colonoscopy and had deaths in the family and was unable to go so had to get rescheduled    Protocols used: RECTAL BLEEDING-ADULT-AH

## 2021-08-09 NOTE — TELEPHONE ENCOUNTER
I agree with the below  The patient did have a colonoscopy scheduled in March but the patient cancelled this and asked for us to call her in a couple of weeks to reschedule  This was never done so I recommend she reschedule at this time

## 2021-08-09 NOTE — TELEPHONE ENCOUNTER
LVM making Pt aware script to [facility] and advised to use good rx coupon   Advised to call office if has any other questions/concerns

## 2021-08-09 NOTE — TELEPHONE ENCOUNTER
Called pt and made aware suppositories sent to [facility] pharmacy  Colonoscopy already scheduled for October   Advised to call office if interventions do not work or has any further questions/concerns

## 2021-08-09 NOTE — TELEPHONE ENCOUNTER
Office visit 12/7/20 Dr Octavia Siemens  Hx: Constipation, GERD  Colonoscopy: 10/2021    Called pt regarding healthcall triage note  Per pt on Friday started having small blood clots on outside of stool and some blood with wiping  Usually happens with 1st of 3 BMs in the AM  Also having mild abdominal pain in LLQ area  Pt reports occasional mild straining but this has greatly lessened with linzess and prn miralax  Pt also reports HA but attributes to this to chronic migraines, denies dizziness/lighthdeadedness  Reports stools as soft/formed  Denies any other appreciating symptoms  Explained to pt blood is most likely hemorrhoid related and can use prepH cream OTC as well as can send in suppos to [facility] pharmacy in [facility]  Advised to go to ER if abdominal pain worsens, blood increases, toilet water changes color  Pt verbalized understanding and agreeable to plan   Please advise

## 2021-08-09 NOTE — TELEPHONE ENCOUNTER
Regardin37 Y/O BLEEDING BOWEL MOVEMENT CLOTS   ----- Message from Fang Gavin sent at 2021 11:40 AM EDT -----  "I am having bleeding, red, blood in the toilet and in the stool, there is also clots and appears on toilet paper when wiping   It begin on Friday "

## 2021-08-09 NOTE — TELEPHONE ENCOUNTER
Unfortunately the patient his insurance denied her suppositories  She cancer with over-the-counter hemorrhoid treatment and/or consider using [facility]  Per their website she can get 12 suppositories from [address] [place] [address] for $30   If she would like me to send this to [facility] please let me know

## 2021-08-25 ENCOUNTER — TELEPHONE (OUTPATIENT)
Dept: OTHER | Facility: OTHER | Age: 36
End: 2021-08-25

## 2021-08-25 NOTE — TELEPHONE ENCOUNTER
Patient called to cancel appointment, stated she needed to pick mother up from hospital  Please call back to reschedule

## 2021-08-27 DIAGNOSIS — G93.2 IDIOPATHIC INTRACRANIAL HYPERTENSION: ICD-10-CM

## 2021-08-27 RX ORDER — FUROSEMIDE 40 MG/1
TABLET ORAL
Qty: 90 TABLET | Refills: 1 | Status: SHIPPED | OUTPATIENT
Start: 2021-08-27 | End: 2021-09-03 | Stop reason: SDUPTHER

## 2021-09-01 ENCOUNTER — TELEPHONE (OUTPATIENT)
Dept: NEUROLOGY | Facility: CLINIC | Age: 36
End: 2021-09-01

## 2021-09-01 NOTE — TELEPHONE ENCOUNTER
Patient called in to report worsening headache  Reports having "on and off" headache x2 weeks that has worsened in intensity within the past 3 days  She is also more fatigued, dizzy, and has had an increase in her weight of 3-4 lbs over the past 3 days  She is concerned regarding fluid retention causing her headaches  States that she gets a headache every time her weight fluctuates like this and she is holding onto more water (states that she has decreased urine output as well)  She reports headache with increased weight and claims that she will lose an average of 5-7 pounds when she does have a headache , then symptoms will improve  She is not sure if you would like to increase lasix from 60 mg daily to another dose, or if you would recommend restarting diamox (stopped this due to dry mouth but was also on adderall at that time - she is agreeable to trying this too if recommended)  Preferred pharm [facility] in [place]  Best call back [phone], okay with detailed messages

## 2021-09-02 NOTE — TELEPHONE ENCOUNTER
Well as our office has not prescribed or managed the Lasix I would not really be able to adjust the dose    it is important to avoid having multiple docs managing a single med, too much chance for getting signals crossed  That being said, as long as her fluid intake is steady, I would suggest that she can call her PCP (going on the premise that they manage her Lasix) and ask if she can bump it to 80mg for 3-5 days to help break this headache cycle  If they are agreeable, I would go that route as it is a temporary adjustment and something that could be repeated intermittently if necessary rather than adding a new medication

## 2021-09-03 NOTE — TELEPHONE ENCOUNTER
Patient called in asking about med rec  *Last prescriber of lasix was Arlet on 8/27/21 - Should I advise the patient increase to the 80 mg for 3-5 days?

## 2021-09-03 NOTE — TELEPHONE ENCOUNTER
Called patient, read Arlet's message  Patient verbalizes understanding of information  Plans to start this tonight and take an extra dose to equate to 80 mg for the day  Reports they do not have any further questions or concerns at this time

## 2021-09-21 ENCOUNTER — TELEPHONE (OUTPATIENT)
Dept: NEUROLOGY | Facility: CLINIC | Age: 36
End: 2021-09-21

## 2021-09-24 ENCOUNTER — TELEPHONE (OUTPATIENT)
Dept: NEUROLOGY | Facility: CLINIC | Age: 36
End: 2021-09-24

## 2021-09-24 ENCOUNTER — OFFICE VISIT (OUTPATIENT)
Dept: NEUROLOGY | Facility: CLINIC | Age: 36
End: 2021-09-24
Payer: COMMERCIAL

## 2021-09-24 VITALS
WEIGHT: 293 LBS | HEIGHT: 68 IN | TEMPERATURE: 97.6 F | BODY MASS INDEX: 44.41 KG/M2 | DIASTOLIC BLOOD PRESSURE: 72 MMHG | SYSTOLIC BLOOD PRESSURE: 121 MMHG | HEART RATE: 86 BPM

## 2021-09-24 DIAGNOSIS — R20.2 NUMBNESS AND TINGLING IN BOTH HANDS: ICD-10-CM

## 2021-09-24 DIAGNOSIS — G43.709 CHRONIC MIGRAINE WITHOUT AURA WITHOUT STATUS MIGRAINOSUS, NOT INTRACTABLE: Primary | ICD-10-CM

## 2021-09-24 DIAGNOSIS — R20.0 NUMBNESS AND TINGLING IN BOTH HANDS: ICD-10-CM

## 2021-09-24 PROCEDURE — 99214 OFFICE O/P EST MOD 30 MIN: CPT | Performed by: PSYCHIATRY & NEUROLOGY

## 2021-09-24 RX ORDER — LORATADINE 10 MG/1
10 TABLET ORAL DAILY
COMMUNITY

## 2021-09-24 RX ORDER — CELECOXIB 200 MG/1
200 CAPSULE ORAL 2 TIMES DAILY
Qty: 60 CAPSULE | Refills: 4 | Status: SHIPPED | OUTPATIENT
Start: 2021-09-24 | End: 2022-02-22

## 2021-09-24 NOTE — TELEPHONE ENCOUNTER
----- Message from Filomena Dyson sent at 9/24/2021  2:46 PM EDT -----  Patient is scheduled with Kelly Modi on 10/27 for botox injection at [facility]  She is part of specialty pharmacy for her authorization      Thanks   Emil Bassett

## 2021-09-24 NOTE — ASSESSMENT & PLAN NOTE
Patient is a 39year old woman presenting for followup of her migraine complicated by IIH  Patient reports improved headaches in terms of severity while on topamax 100mg daily, emgality, and her duration of her botox injection on 7/28/2021 (lasts for 3 months)  Throbbing Headaches are daily but at a more tolerable pain  Patient's headaches however are still not at a level of good headache control according to patient  Patient did have a transient episode of tingling and numbness that she had a month ago but did not have since  Neurologic exam on this visit showed intact sensation (pain, temperature, fine touch) and no focal motor/sensory deficits  Impression: Patient has chronic migraine without aura which have been better controlled while on the emgality, topamax 100mg daily, and botox from 7/28/2021 (patient currently in the 3 month treatment period of her botox)  It is unclear if a certain combination of the above medications or just one of the medications causing her the benefit in terms of her migraine  With that said, her migraines continues to not completely under control and will need further adjustments  Plan:  Continue topamax and emgality as previously prescribed  If you have the tingling sensation of the bilateral hands, you can take potassium tablets to help with the tingling and numbing sensation  Lowered potassium with topamax and the lasix you are on can cause tingling and numbing sensation  Please get appointment for botox on 10/27/2021      We recommend taking a dose of motrin before getting your botox on next appointment    Take celebrex 200mg twice daily; can take with milk to help with your stomach    Contact office if there are further concerns with your migraine and your medications you are on    Followup in 6 months

## 2021-09-24 NOTE — ASSESSMENT & PLAN NOTE
Patient has transient numbness and tingling of bilateral hands around a month ago that patient no longer has  The transient numbness and tingling potentially can be due to her topamax for migraines  As the topamax is otherwise seems beneficial for her migraines and patient has no further numbness and tingling episodes, we will continue the topamax 100mg daily  We gave recommendation that patient can take potassium supplements to help if she has the numbness and tingling sensation again with the topamax

## 2021-09-24 NOTE — PROGRESS NOTES
Patient ID: Ileana Geller is a 39 y o  female  Assessment/Plan:    Chronic migraine without aura without status migrainosus, not intractable  Patient is a 39year old woman presenting for followup of her migraine complicated by IIH  Patient reports improved headaches in terms of severity while on topamax 100mg daily, emgality, and her duration of her botox injection on 7/28/2021 (lasts for 3 months)  Throbbing Headaches are daily but at a more tolerable pain  Patient's headaches however are still not at a level of good headache control according to patient  Patient did have a transient episode of tingling and numbness that she had a month ago but did not have since  Neurologic exam on this visit showed intact sensation (pain, temperature, fine touch) and no focal motor/sensory deficits  Impression: Patient has chronic migraine without aura which have been better controlled while on the emgality, topamax 100mg daily, and botox from 7/28/2021 (patient currently in the 3 month treatment period of her botox)  It is unclear if a certain combination of the above medications or just one of the medications causing her the benefit in terms of her migraine  With that said, her migraines continues to not completely under control and will need further adjustments  Plan:  Continue topamax and emgality as previously prescribed  If you have the tingling sensation of the bilateral hands, you can take potassium tablets to help with the tingling and numbing sensation  Lowered potassium with topamax and the lasix you are on can cause tingling and numbing sensation  Please get appointment for botox on 10/27/2021      We recommend taking a dose of motrin before getting your botox on next appointment    Take celebrex 200mg twice daily; can take with milk to help with your stomach    Contact office if there are further concerns with your migraine and your medications you are on    Followup in 6 months    Numbness and tingling in both hands  Patient has transient numbness and tingling of bilateral hands around a month ago that patient no longer has  The transient numbness and tingling potentially can be due to her topamax for migraines  As the topamax is otherwise seems beneficial for her migraines and patient has no further numbness and tingling episodes, we will continue the topamax 100mg daily  We gave recommendation that patient can take potassium supplements to help if she has the numbness and tingling sensation again with the topamax  Diagnoses and all orders for this visit:    Chronic migraine without aura without status migrainosus, not intractable  -     celecoxib (CeleBREX) 200 mg capsule; Take 1 capsule (200 mg total) by mouth 2 (two) times a day    Numbness and tingling in both hands    Other orders  -     loratadine (CLARITIN) 10 mg tablet; Take 10 mg by mouth daily           Subjective:    Patient is a 39year old woman presenting for migraine followup with IIH  Patient initially was on Indocin twice daily but now is discontinued  Patient was on Emgality once a month and topamax 100mg in morning  Patient was also on lasix which also helped with headaches, and seemingly helped with IIH and did not tolerate diamox well  Lasix seems to have helped with her polyuria  Headaches have become better in terms of severity with emgality  Patient is able to take basic tasks done  Patient had transcranial magnetic stimulator with initial intent of helping with her anxiety and depression was placed last week which patient reports improvements with headaches  Patient continues to have migraines daily and is more tolerable but now is functional  Recently her migraines have been 4/10 from previously from the previous visit, 8-9/10 (patient had to lie down and was dysfunctional, cannot wake up for 3 days)  Headaches currently lasts around a hour now  Patient has the headaches at least 3-5 days every other week   Patient has also had botox injections on 7/28/2021, from which she felt she had severe headache after the botox although patient no other side effects and no acute neurological dysfunction after the botox  patient has had no droopy eyebrows after the botox    No more severe headaches for patient but patient wants further improvements in terms of her headaches  Patient previously been on indocin but patient has stopped it with discussion with the neurology office because she felt that it was not needed to reduce the headaches  Patient also previously tried depakote after the previous visit  The following portions of the patient's history were reviewed and updated as appropriate: She  has a past medical history of Anorexia nervosa in remission, Anxiety, Back pain, Bipolar disorder ([facility] [address] ), Depression, Esophageal reflux (8/15/2013), Hypertension, Hypothyroid, Idiopathic intracranial hypertension, Lumbar degenerative disc disease (5/8/2014), Migraine, Obesity, Obsessive-compulsive disorder, Overactive bladder, Psychiatric disorder, Restless leg syndrome, controlled, Seasonal allergies, Suicide and self-inflicted injury ([facility] [address] ), and Transcranial Magnetic Stimulation (09/06/2021)    She   Patient Active Problem List    Diagnosis Date Noted    Numbness and tingling in both hands 09/24/2021    Cervicalgia 01/06/2021    Encntr for gyn exam (general) (routine) w/o abn findings 03/06/2020    IUD check up 03/06/2020    BMI 60 0-69 9, adult ([facility] [address] ) 03/06/2020    CAIO (obstructive sleep apnea)     Encounter for insertion of mirena IUD 02/10/2020    Menorrhagia with irregular cycle 12/30/2019    Chronic migraine without aura without status migrainosus, not intractable 03/04/2019    Chronic tension-type headache, not intractable 03/04/2019    Idiopathic intracranial hypertension 02/28/2019    Visual field defect 02/28/2019    Family history of cancer 01/22/2019    Breast nodule 12/04/2018    Recurrent sinusitis 11/29/2018    Impaired fasting glucose 11/29/2018    Bipolar II disorder (HCC) 09/28/2018    Skin lesion 09/06/2018    Multiple nevi 09/06/2018    Iliotibial band syndrome of left side 09/04/2018    Piriformis syndrome of left side 09/04/2018    Irregular bleeding 08/02/2018    Chronic midline low back pain without sciatica 07/17/2018    Dysfunction of both eustachian tubes 05/20/2018    Encntr for gyn exam (general) (routine) w abnormal findings 02/26/2018    Vaginal relaxation 02/26/2018    Routine screening for STI (sexually transmitted infection) 02/26/2018    BMI 50 0-59 9, adult ([facility] [address] ) 02/26/2018    Obsessive-compulsive disorder 01/24/2018    Hypertension 01/24/2018    Hypothyroidism 01/24/2018    Overactive bladder 01/24/2018    Allergic rhinitis 01/24/2018    PTSD (post-traumatic stress disorder) 10/12/2017    Urgency incontinence 07/06/2017    Severe bipolar I disorder, most recent episode depressed ([facility] [address] ) 05/25/2017    Nocturnal enuresis 05/16/2017    Binge-eating disorder, severe 09/09/2016    RLS (restless legs syndrome) 07/21/2016    EILEEN (generalized anxiety disorder) 11/18/2015    Gambling disorder, moderate 06/15/2015    Morbid obesity ([facility] [address] ) 09/25/2014    Bulging lumbar disc 05/08/2014    Lumbar degenerative disc disease 05/08/2014    Vitamin D deficiency 01/15/2014    Esophageal reflux 08/15/2013    Amenorrhea 05/17/2012     She  has a past surgical history that includes Tonsillectomy; Sinus endoscopy; Dental surgery; and IR lumbar puncture (2/26/2019)    Her family history includes Arthritis in her family; Breast cancer in her family and maternal grandmother; Cancer in her maternal grandfather, maternal grandmother, and paternal grandmother; Depression in her mother; Diabetes in her mother; Heart disease in her maternal grandmother; Hypertension in her brother, family, father, and mother; Hypothyroidism in her father; Mental illness in her father; Osteopenia in her family; Osteoporosis in her family; Other in her mother; Pneumonia in her maternal grandfather and paternal grandfather; Skin cancer in her maternal grandmother; Stroke in her maternal grandmother; Suicide Attempts in her mother; Thyroid disease in her father; Transient ischemic attack in her family  She  reports that she has never smoked  She has never used smokeless tobacco  She reports previous alcohol use  She reports that she does not use drugs  Current Outpatient Medications   Medication Sig Dispense Refill    Botulinum Toxin Type A (Botox) 200 units SOLR Inject as directed every 3 (three) months      buPROPion (WELLBUTRIN XL) 300 mg 24 hr tablet Take 300 mg by mouth daily       Cholecalciferol (VITAMIN D3) 5000 units CAPS Take 5,000 Units by mouth daily      clomiPRAMINE (ANAFRANIL) 50 mg capsule Take 100 mg by mouth 2 (two) times a day      furosemide (LASIX) 40 mg tablet Take 2 tabs daily for 3 days then reduce back to 1 5 tabs daily  (Patient taking differently: Pt is taking 1 5 tablets in the morning ) 120 tablet 1    gabapentin (NEURONTIN) 600 MG tablet TAKE 1/2 TABLET DAILY IN AM AND 1 AND 1/2 TABLET AT BEDTIME      Galcanezumab-gnlm 120 MG/ML SOAJ Inject 240 mg under the skin every 30 (thirty) days for 30 days, THEN 120 mg every 30 (thirty) days   2 mL 3    levothyroxine 125 mcg tablet TAKE 1 TABLET BY MOUTH EVERY DAY IN THE MORNING 90 tablet 0    Linzess 290 MCG CAPS TAKE 1 CAPSULE BY MOUTH EVERY DAY 30 capsule 5    LORazepam (ATIVAN) 1 mg tablet Take 1 mg by mouth 3 (three) times a day as needed for anxiety      MAGNESIUM PO Take 1 tablet by mouth daily      Multiple Vitamin (MULTIVITAMIN) capsule Take 1 capsule by mouth daily      omeprazole (PriLOSEC) 20 mg delayed release capsule TAKE 1 CAPSULE BY MOUTH TWICE A  capsule 1    prochlorperazine (COMPAZINE) 10 mg tablet 1 tab at onset of migraine, can repeat in 8 hours, can take with triptan/NSAID 10 tablet 0    RESTASIS 0 05 % ophthalmic emulsion PLACE 1 DROP INTO BOTH EYES TWICE A DAY  3    rOPINIRole (REQUIP) 4 mg tablet TAKE 1 TABLET BY MOUTH TWICE A DAY (Patient taking differently: TAKE 1 TABLET BY MOUTH DAILY) 180 tablet 1    sertraline (ZOLOFT) 100 mg tablet Take 200 mg by mouth daily  0    topiramate (TOPAMAX) 100 mg tablet Take 1 tablet (100 mg total) by mouth daily 90 tablet 3    Vraylar 6 MG capsule Take 6 mg by mouth daily daily      Vyvanse 30 MG capsule Take 50 mg by mouth daily       celecoxib (CeleBREX) 200 mg capsule Take 1 capsule (200 mg total) by mouth 2 (two) times a day 60 capsule 4    divalproex sodium (DEPAKOTE ER) 500 mg 24 hr tablet 1 tab HS X 3-4 days, retain remaining 2-3 tabs and use if/as directed   (Patient not taking: Reported on 9/24/2021) 6 tablet 0    hydrocortisone (ANUSOL-HC) 25 mg suppository Insert 1 suppository (25 mg total) into the rectum 2 (two) times a day (Patient not taking: Reported on 9/24/2021) 12 suppository 0    ketorolac (TORADOL) 10 mg tablet Take 1 tablet (10 mg total) by mouth 2 (two) times a day as needed for moderate pain (migraine headaches) (Patient not taking: Reported on 9/24/2021) 10 tablet 0    loratadine (CLARITIN) 10 mg tablet Take 10 mg by mouth daily       Current Facility-Administered Medications   Medication Dose Route Frequency Provider Last Rate Last Admin    Botulinum Toxin Type A SOLR 200 Units  200 Units Injection Q3 Months Arlet Doyle PA-C   200 Units at 04/01/21 1453    Botulinum Toxin Type A SOLR 200 Units  200 Units Injection Q3 Months Emmanuelle Castrejon MD   200 Units at 07/28/21 1211     Current Outpatient Medications on File Prior to Visit   Medication Sig    Botulinum Toxin Type A (Botox) 200 units SOLR Inject as directed every 3 (three) months    buPROPion (WELLBUTRIN XL) 300 mg 24 hr tablet Take 300 mg by mouth daily     Cholecalciferol (VITAMIN D3) 5000 units CAPS Take 5,000 Units by mouth daily    clomiPRAMINE (ANAFRANIL) 50 mg capsule Take 100 mg by mouth 2 (two) times a day    furosemide (LASIX) 40 mg tablet Take 2 tabs daily for 3 days then reduce back to 1 5 tabs daily  (Patient taking differently: Pt is taking 1 5 tablets in the morning )    gabapentin (NEURONTIN) 600 MG tablet TAKE 1/2 TABLET DAILY IN AM AND 1 AND 1/2 TABLET AT BEDTIME    Galcanezumab-gnlm 120 MG/ML SOAJ Inject 240 mg under the skin every 30 (thirty) days for 30 days, THEN 120 mg every 30 (thirty) days   levothyroxine 125 mcg tablet TAKE 1 TABLET BY MOUTH EVERY DAY IN THE MORNING    Linzess 290 MCG CAPS TAKE 1 CAPSULE BY MOUTH EVERY DAY    LORazepam (ATIVAN) 1 mg tablet Take 1 mg by mouth 3 (three) times a day as needed for anxiety    MAGNESIUM PO Take 1 tablet by mouth daily    Multiple Vitamin (MULTIVITAMIN) capsule Take 1 capsule by mouth daily    omeprazole (PriLOSEC) 20 mg delayed release capsule TAKE 1 CAPSULE BY MOUTH TWICE A DAY    prochlorperazine (COMPAZINE) 10 mg tablet 1 tab at onset of migraine, can repeat in 8 hours, can take with triptan/NSAID    RESTASIS 0 05 % ophthalmic emulsion PLACE 1 DROP INTO BOTH EYES TWICE A DAY    rOPINIRole (REQUIP) 4 mg tablet TAKE 1 TABLET BY MOUTH TWICE A DAY (Patient taking differently: TAKE 1 TABLET BY MOUTH DAILY)    sertraline (ZOLOFT) 100 mg tablet Take 200 mg by mouth daily    topiramate (TOPAMAX) 100 mg tablet Take 1 tablet (100 mg total) by mouth daily    Vraylar 6 MG capsule Take 6 mg by mouth daily daily    Vyvanse 30 MG capsule Take 50 mg by mouth daily     divalproex sodium (DEPAKOTE ER) 500 mg 24 hr tablet 1 tab HS X 3-4 days, retain remaining 2-3 tabs and use if/as directed   (Patient not taking: Reported on 9/24/2021)    hydrocortisone (ANUSOL-HC) 25 mg suppository Insert 1 suppository (25 mg total) into the rectum 2 (two) times a day (Patient not taking: Reported on 9/24/2021)    ketorolac (TORADOL) 10 mg tablet Take 1 tablet (10 mg total) by mouth 2 (two) times a day as needed for moderate pain (migraine headaches) (Patient not taking: Reported on 9/24/2021)    loratadine (CLARITIN) 10 mg tablet Take 10 mg by mouth daily    [DISCONTINUED] fremanezumab-vfrm (Ajovy) 225 MG/1 5ML prefilled syringe Inject 1 5 mL (225 mg total) under the skin every 30 (thirty) days (Patient not taking: Reported on 9/24/2021)    [DISCONTINUED] indomethacin (INDOCIN) 50 mg capsule Take 2 capsules (100 mg total) by mouth 3 (three) times a day with meals (Patient not taking: Reported on 9/24/2021)    [DISCONTINUED] meloxicam (MOBIC) 7 5 mg tablet TAKE 1 AFTER BREAKFAST AND DINNER DAILY AS NEEDED FOR PAIN AND SCIATICA  Current Facility-Administered Medications on File Prior to Visit   Medication    Botulinum Toxin Type A SOLR 200 Units    Botulinum Toxin Type A SOLR 200 Units     She is allergic to doxycycline and other            Objective:    Blood pressure 121/72, pulse 86, temperature 97 6 °F (36 4 °C), temperature source Temporal, height 5' 8" (1 727 m), weight (!) 190 kg (419 lb)  Physical Exam  Eyes:      Extraocular Movements: Extraocular movements intact  Neurological:      Coordination: Coordination is intact  Romberg sign negative  Deep Tendon Reflexes: Strength normal and reflexes are normal and symmetric  Psychiatric:         Speech: Speech normal          Neurological Exam  Mental Status  Awake, alert and oriented to person, place and time  Speech is normal  Language is fluent with no aphasia  Cranial Nerves  CN II: Visual acuity is normal   CN III, IV, VI: Extraocular movements intact bilaterally  CN V: Facial sensation is normal   CN VII: Full and symmetric facial movement  CN XI: Shoulder shrug strength is normal   CN XII: Tongue midline without atrophy or fasciculations  Funduscopic Exam showed normal uninterrupted blood vessels and intact margination of optic discs bilaterally  Motor  Normal muscle bulk throughout  No fasciculations present  Normal muscle tone   No abnormal involuntary movements  Strength is 5/5 throughout all four extremities  Sensory  Sensation is intact to light touch, pinprick, vibration and proprioception in all four extremities  Reflexes  Deep tendon reflexes are 2+ and symmetric in all four extremities with downgoing toes bilaterally  Coordination  Finger-to-nose, rapid alternating movements and heel-to-shin normal bilaterally without dysmetria  Gait  Normal casual, toe, heel and tandem gait  Romberg is absent  ROS:    Review of Systems   Constitutional: Negative  Negative for appetite change and fever  HENT: Negative  Negative for hearing loss, tinnitus, trouble swallowing and voice change  Eyes: Negative  Negative for photophobia and pain  Respiratory: Negative  Negative for shortness of breath  Cardiovascular: Negative  Negative for palpitations  Gastrointestinal: Positive for constipation  Negative for nausea and vomiting  Endocrine: Negative  Negative for cold intolerance  Genitourinary: Positive for frequency  Negative for dysuria  Musculoskeletal: Positive for neck pain and neck stiffness  Negative for myalgias  Skin: Negative  Negative for rash  Neurological: Positive for light-headedness, numbness (hands on and off) and headaches  Negative for tremors, seizures, syncope, facial asymmetry, speech difficulty and weakness  Hematological: Negative  Does not bruise/bleed easily  Psychiatric/Behavioral: Negative  Negative for confusion, hallucinations and sleep disturbance

## 2021-09-24 NOTE — PATIENT INSTRUCTIONS
Continue topamax and emgality as previously prescribed  If you have the tingling sensation of the bilateral hands, you can take potassium tablets to help with the tingling and numbing sensation  Lowered potassium with topamax and the lasix you are on can cause tingling and numbing sensation  Please get appointment for botox on 10/27/2021      We recommend taking a dose of motrin before getting your botox on next appointment    Take celebrex 200mg twice daily; can take with milk to help with your stomach    Contact office if there are further concerns with your migraine and your medications you are on    Followup in 6 months

## 2021-09-29 ENCOUNTER — CONSULT (OUTPATIENT)
Dept: BARIATRICS | Facility: CLINIC | Age: 36
End: 2021-09-29
Payer: COMMERCIAL

## 2021-09-29 VITALS
DIASTOLIC BLOOD PRESSURE: 95 MMHG | SYSTOLIC BLOOD PRESSURE: 129 MMHG | BODY MASS INDEX: 47.09 KG/M2 | HEART RATE: 86 BPM | WEIGHT: 293 LBS | TEMPERATURE: 99.7 F | HEIGHT: 66 IN

## 2021-09-29 DIAGNOSIS — E03.9 ACQUIRED HYPOTHYROIDISM: ICD-10-CM

## 2021-09-29 DIAGNOSIS — K21.9 GASTROESOPHAGEAL REFLUX DISEASE WITHOUT ESOPHAGITIS: ICD-10-CM

## 2021-09-29 DIAGNOSIS — F31.4 SEVERE BIPOLAR I DISORDER, MOST RECENT EPISODE DEPRESSED (HCC): ICD-10-CM

## 2021-09-29 DIAGNOSIS — I10 ESSENTIAL HYPERTENSION: Chronic | ICD-10-CM

## 2021-09-29 DIAGNOSIS — R73.01 IMPAIRED FASTING GLUCOSE: ICD-10-CM

## 2021-09-29 DIAGNOSIS — R73.01 IFG (IMPAIRED FASTING GLUCOSE): ICD-10-CM

## 2021-09-29 DIAGNOSIS — F42.2 MIXED OBSESSIONAL THOUGHTS AND ACTS: ICD-10-CM

## 2021-09-29 DIAGNOSIS — E66.01 MORBID (SEVERE) OBESITY DUE TO EXCESS CALORIES (HCC): Primary | ICD-10-CM

## 2021-09-29 DIAGNOSIS — G47.33 OSA (OBSTRUCTIVE SLEEP APNEA): ICD-10-CM

## 2021-09-29 DIAGNOSIS — R63.5 ABNORMAL WEIGHT GAIN: ICD-10-CM

## 2021-09-29 DIAGNOSIS — F50.81 BINGE-EATING DISORDER, SEVERE: ICD-10-CM

## 2021-09-29 PROCEDURE — 3008F BODY MASS INDEX DOCD: CPT | Performed by: FAMILY MEDICINE

## 2021-09-29 PROCEDURE — 99203 OFFICE O/P NEW LOW 30 MIN: CPT | Performed by: FAMILY MEDICINE

## 2021-09-29 PROCEDURE — 1036F TOBACCO NON-USER: CPT | Performed by: FAMILY MEDICINE

## 2021-09-29 RX ORDER — LISDEXAMFETAMINE DIMESYLATE 50 MG
50 CAPSULE ORAL DAILY
COMMUNITY
Start: 2021-09-08

## 2021-09-29 NOTE — PROGRESS NOTES
Assessment/Plan:        Diagnoses and all orders for this visit:    Morbid (severe) obesity due to excess calories (HCC)  -     metFORMIN (GLUCOPHAGE) 500 mg tablet; Take 1 tablet (500 mg total) by mouth 2 (two) times a day with meals    Gastroesophageal reflux disease without esophagitis    Acquired hypothyroidism    Impaired fasting glucose    CAIO (obstructive sleep apnea)    Essential hypertension    Binge-eating disorder, severe  -     metFORMIN (GLUCOPHAGE) 500 mg tablet; Take 1 tablet (500 mg total) by mouth 2 (two) times a day with meals    Mixed obsessional thoughts and acts    Severe bipolar I disorder, most recent episode depressed (HCC)    IFG (impaired fasting glucose)  -     metFORMIN (GLUCOPHAGE) 500 mg tablet; Take 1 tablet (500 mg total) by mouth 2 (two) times a day with meals    Abnormal weight gain  -     metFORMIN (GLUCOPHAGE) 500 mg tablet; Take 1 tablet (500 mg total) by mouth 2 (two) times a day with meals    Other orders  -     Vyvanse 50 MG capsule; Take 50 mg by mouth daily      -Discussed options of [facility]-Intensive Lifestyle Intervention Program, Very Low Calorie Diet-VLCD, Conservative Program, Brenda-En-Y Gastric Bypass and Vertical Sleeve Gastrectomy and the role of weight loss medications  NOT interested in surgery anymore  -Initial weight loss goal of 5-10% weight loss for improved health  -Screening labs- 6/26/21  -Patient is interested in pursuing Conservative Program  - currently on topamax 100mg qd, wellbutrin 300mg qd  - avoid sympathomimetics (hx bipolar, takes vyvanse)  - discussed adding metformin for IFG, weight loss  She agrees, she actually lost 50lbs in the past on metformin  Goals:  Food log (ie ) www myfitnesspal com,sparkpeople  com,loseit com,calorieking  com,etc  baritastic  No sugary beverages  At least 64oz of water daily  Increase physical activity by 10 minutes daily   Gradually increase physical activity to a goal of 5 days per week for 30 minutes of MODERATE intensity PLUS 2 days per week of FULL BODY resistance training    45 minute visit, >50% face-to-face time spent counseling patient on surgical and nonsurgical interventions for the treatment of excess weight  Discussed the advantages and long-term outcomes with regards to bariatric surgery  Discussed in detail nonsurgical options including intensive lifestyle intervention program, very low-calorie diet program and conservative program   Discussed the role of weight loss medications  Counseled patient on diet behavior and exercise modification for weight loss  Follow up in approximately 2 months with Non-Surgical Physician/Advanced Practitioner  Subjective:   Chief Complaint   Patient presents with    Consult     mwm consult       Patient ID: Joel Modi  is a 39 y o  female with excess weight/obesity here to pursue weight management  Past Medical History:   Diagnosis Date    Anorexia nervosa in remission     Anxiety     Back pain     [facility]     Depression     Esophageal reflux 8/15/2013    Hypertension     Hypothyroid     Idiopathic intracranial hypertension     Lumbar degenerative disc disease 5/8/2014    Migraine     Obesity     Obsessive-compulsive disorder     Overactive bladder     Psychiatric disorder     Restless leg syndrome, controlled     Seasonal allergies     Suicide and self-inflicted injury ([facility]     Transcranial Magnetic Stimulation 09/06/2021       HPI:  Obesity/Excess Weight:  Severity: Very Severe  Onset: many years most her life     Modifiers: Diet and Exercise  Contributing factors: Poor Food Choices, Insufficient Physical Activity, Stress/Emotional Eating, Medications and Depression  Associated symptoms: fatigue, body image issues, decreased self esteem and depression    Last psych hospitalization in 2018  Previous history of anorexia in 2008  Goals: ?  Just wants to feel healthy  Hydration:  Alcohol: no  Smoking:no  Exercise:  Sleep: 8hr  STOP bang: mild CAIO    Social:  On disability  Lives alone with emotional support dog  The following portions of the patient's history were reviewed and updated as appropriate: allergies, current medications, past family history, past medical history, past social history, past surgical history and problem list     Review of Systems   Constitutional: Negative for activity change and appetite change  Respiratory: Negative  Cardiovascular: Negative  Gastrointestinal: Negative  Genitourinary: Negative  Musculoskeletal: Negative for arthralgias  Skin: Negative for rash  Psychiatric/Behavioral: Negative  Objective:    /95 (BP Location: Right arm, Patient Position: Sitting, Cuff Size: Extra-Large)   Pulse 86   Temp 99 7 °F (37 6 °C)   Ht 5' 6" (1 676 m)   Wt (!) 191 kg (421 lb)   BMI 67 95 kg/m²     Physical Exam    Constitutional   General appearance: Abnormal   well developed and morbidly obese  Eyes No conjunctival pallor     Musculoskeletal   Gait and station: Normal     Psychiatric   Orientation to person, place and time: Normal     Affect: appropriate

## 2021-09-30 ENCOUNTER — TELEPHONE (OUTPATIENT)
Dept: BARIATRICS | Facility: CLINIC | Age: 36
End: 2021-09-30

## 2021-10-08 DIAGNOSIS — E03.9 HYPOTHYROIDISM, UNSPECIFIED TYPE: ICD-10-CM

## 2021-10-08 RX ORDER — LEVOTHYROXINE SODIUM 0.12 MG/1
125 TABLET ORAL EVERY MORNING
Qty: 90 TABLET | Refills: 0 | Status: SHIPPED | OUTPATIENT
Start: 2021-10-08 | End: 2021-12-20

## 2021-10-14 ENCOUNTER — TELEPHONE (OUTPATIENT)
Dept: SURGERY | Facility: HOSPITAL | Age: 36
End: 2021-10-14

## 2021-10-14 DIAGNOSIS — G43.709 CHRONIC MIGRAINE WITHOUT AURA WITHOUT STATUS MIGRAINOSUS, NOT INTRACTABLE: Primary | ICD-10-CM

## 2021-10-15 ENCOUNTER — ANESTHESIA EVENT (OUTPATIENT)
Dept: PERIOP | Facility: HOSPITAL | Age: 36
End: 2021-10-15

## 2021-10-15 ENCOUNTER — ANESTHESIA (OUTPATIENT)
Dept: PERIOP | Facility: HOSPITAL | Age: 36
End: 2021-10-15

## 2021-10-15 ENCOUNTER — HOSPITAL ENCOUNTER (OUTPATIENT)
Dept: PERIOP | Facility: HOSPITAL | Age: 36
Setting detail: OUTPATIENT SURGERY
Discharge: HOME | End: 2021-10-15
Attending: INTERNAL MEDICINE | Admitting: INTERNAL MEDICINE
Payer: COMMERCIAL

## 2021-10-15 VITALS
RESPIRATION RATE: 20 BRPM | HEIGHT: 67 IN | HEART RATE: 70 BPM | SYSTOLIC BLOOD PRESSURE: 132 MMHG | OXYGEN SATURATION: 99 % | DIASTOLIC BLOOD PRESSURE: 72 MMHG | WEIGHT: 293 LBS | BODY MASS INDEX: 45.99 KG/M2 | TEMPERATURE: 97.4 F

## 2021-10-15 DIAGNOSIS — K21.9 GASTROESOPHAGEAL REFLUX DISEASE WITHOUT ESOPHAGITIS: ICD-10-CM

## 2021-10-15 PROBLEM — E66.813 CLASS 3 SEVERE OBESITY WITH BODY MASS INDEX (BMI) OF 60.0 TO 69.9 IN ADULT (HCC): Status: ACTIVE | Noted: 2020-03-06

## 2021-10-15 LAB
EXT PREGNANCY TEST URINE: NEGATIVE
EXT. CONTROL: NORMAL

## 2021-10-15 PROCEDURE — 81025 URINE PREGNANCY TEST: CPT | Performed by: ANESTHESIOLOGY

## 2021-10-15 PROCEDURE — 45378 DIAGNOSTIC COLONOSCOPY: CPT | Performed by: INTERNAL MEDICINE

## 2021-10-15 RX ORDER — LIDOCAINE HYDROCHLORIDE 10 MG/ML
INJECTION, SOLUTION EPIDURAL; INFILTRATION; INTRACAUDAL; PERINEURAL AS NEEDED
Status: DISCONTINUED | OUTPATIENT
Start: 2021-10-15 | End: 2021-10-15

## 2021-10-15 RX ORDER — SODIUM CHLORIDE, SODIUM LACTATE, POTASSIUM CHLORIDE, CALCIUM CHLORIDE 600; 310; 30; 20 MG/100ML; MG/100ML; MG/100ML; MG/100ML
INJECTION, SOLUTION INTRAVENOUS CONTINUOUS PRN
Status: DISCONTINUED | OUTPATIENT
Start: 2021-10-15 | End: 2021-10-15

## 2021-10-15 RX ORDER — PROPOFOL 10 MG/ML
INJECTION, EMULSION INTRAVENOUS CONTINUOUS PRN
Status: DISCONTINUED | OUTPATIENT
Start: 2021-10-15 | End: 2021-10-15

## 2021-10-15 RX ORDER — PROPOFOL 10 MG/ML
INJECTION, EMULSION INTRAVENOUS AS NEEDED
Status: DISCONTINUED | OUTPATIENT
Start: 2021-10-15 | End: 2021-10-15

## 2021-10-15 RX ADMIN — PROPOFOL 150 MCG/KG/MIN: 10 INJECTION, EMULSION INTRAVENOUS at 11:04

## 2021-10-15 RX ADMIN — PROPOFOL 120 MG: 10 INJECTION, EMULSION INTRAVENOUS at 11:00

## 2021-10-15 RX ADMIN — PROPOFOL 50 MG: 10 INJECTION, EMULSION INTRAVENOUS at 11:01

## 2021-10-15 RX ADMIN — PROPOFOL 50 MG: 10 INJECTION, EMULSION INTRAVENOUS at 11:03

## 2021-10-15 RX ADMIN — LIDOCAINE HYDROCHLORIDE 50 MG: 10 INJECTION, SOLUTION EPIDURAL; INFILTRATION; INTRACAUDAL; PERINEURAL at 11:00

## 2021-10-15 RX ADMIN — SODIUM CHLORIDE, SODIUM LACTATE, POTASSIUM CHLORIDE, AND CALCIUM CHLORIDE: .6; .31; .03; .02 INJECTION, SOLUTION INTRAVENOUS at 10:53

## 2021-10-15 RX ADMIN — PROPOFOL 50 MG: 10 INJECTION, EMULSION INTRAVENOUS at 11:02

## 2021-10-16 ENCOUNTER — OFFICE VISIT (OUTPATIENT)
Dept: INTERNAL MEDICINE CLINIC | Facility: CLINIC | Age: 36
End: 2021-10-16
Payer: COMMERCIAL

## 2021-10-16 VITALS
BODY MASS INDEX: 45.99 KG/M2 | RESPIRATION RATE: 16 BRPM | SYSTOLIC BLOOD PRESSURE: 136 MMHG | HEART RATE: 78 BPM | HEIGHT: 67 IN | DIASTOLIC BLOOD PRESSURE: 80 MMHG | TEMPERATURE: 99.5 F | OXYGEN SATURATION: 100 % | WEIGHT: 293 LBS

## 2021-10-16 DIAGNOSIS — E07.9 THYROID DISEASE NEUROPATHY (HCC): ICD-10-CM

## 2021-10-16 DIAGNOSIS — L30.9 DERMATITIS: ICD-10-CM

## 2021-10-16 DIAGNOSIS — Z23 ENCOUNTER FOR VACCINATION: ICD-10-CM

## 2021-10-16 DIAGNOSIS — K59.09 CHRONIC CONSTIPATION: Primary | ICD-10-CM

## 2021-10-16 DIAGNOSIS — E87.70 HYPERVOLEMIA, UNSPECIFIED HYPERVOLEMIA TYPE: ICD-10-CM

## 2021-10-16 DIAGNOSIS — G63 THYROID DISEASE NEUROPATHY (HCC): ICD-10-CM

## 2021-10-16 PROCEDURE — 99214 OFFICE O/P EST MOD 30 MIN: CPT | Performed by: PHYSICIAN ASSISTANT

## 2021-10-16 PROCEDURE — 90686 IIV4 VACC NO PRSV 0.5 ML IM: CPT | Performed by: PHYSICIAN ASSISTANT

## 2021-10-16 PROCEDURE — 3008F BODY MASS INDEX DOCD: CPT | Performed by: PHYSICIAN ASSISTANT

## 2021-10-16 PROCEDURE — G0008 ADMIN INFLUENZA VIRUS VAC: HCPCS | Performed by: PHYSICIAN ASSISTANT

## 2021-10-16 PROCEDURE — 3725F SCREEN DEPRESSION PERFORMED: CPT | Performed by: PHYSICIAN ASSISTANT

## 2021-10-16 PROCEDURE — 1036F TOBACCO NON-USER: CPT | Performed by: PHYSICIAN ASSISTANT

## 2021-10-16 RX ORDER — HYDROCORTISONE AND ACETIC ACID 20.75; 10.375 MG/ML; MG/ML
3 SOLUTION AURICULAR (OTIC) EVERY 6 HOURS SCHEDULED
Qty: 10 ML | Refills: 0 | Status: SHIPPED | OUTPATIENT
Start: 2021-10-16

## 2021-10-22 DIAGNOSIS — E66.01 MORBID (SEVERE) OBESITY DUE TO EXCESS CALORIES (HCC): ICD-10-CM

## 2021-10-22 DIAGNOSIS — R63.5 ABNORMAL WEIGHT GAIN: ICD-10-CM

## 2021-10-22 DIAGNOSIS — F50.81 BINGE-EATING DISORDER, SEVERE: ICD-10-CM

## 2021-10-22 DIAGNOSIS — R73.01 IFG (IMPAIRED FASTING GLUCOSE): ICD-10-CM

## 2021-10-22 RX ORDER — RIMEGEPANT SULFATE 75 MG/75MG
75 TABLET, ORALLY DISINTEGRATING ORAL EVERY OTHER DAY
Qty: 16 TABLET | Refills: 3 | Status: SHIPPED | OUTPATIENT
Start: 2021-10-22 | End: 2021-10-28 | Stop reason: SDUPTHER

## 2021-10-25 ENCOUNTER — TELEPHONE (OUTPATIENT)
Dept: NEUROLOGY | Facility: CLINIC | Age: 36
End: 2021-10-25

## 2021-10-27 NOTE — PSYCH
[facility]                                                                     MD Surekha Clark,                                               Epilepsy Consult #: [phone] ([phone])                                               Office: [address] [place] [address], [address]                                                 Phone: [phone]; Fax: [phone]                            ==============================================    EPILEPSY FOLLOW-UP NOTE      INTERVAL HISTORY:  No seizure as we taper phenytoin and decreased divalproex sodium DR.    MEDICATIONS:   acetaminophen     Tablet .. 650 milliGRAM(s) Oral every 6 hours PRN Temp greater or equal to 38C (100.4F), Mild Pain (1 - 3)  aluminum hydroxide/magnesium hydroxide/simethicone Suspension 30 milliLiter(s) Oral every 4 hours PRN Dyspepsia  amLODIPine   Tablet 10 milliGRAM(s) Oral daily  diVALproex DR 1000 milliGRAM(s) Oral two times a day  enoxaparin Injectable 40 milliGRAM(s) SubCutaneous daily  lacosamide 200 milliGRAM(s) Oral two times a day  melatonin 3 milliGRAM(s) Oral at bedtime PRN Insomnia  ondansetron Injectable 4 milliGRAM(s) IV Push every 8 hours PRN Nausea and/or Vomiting  phenytoin   Chewable 50 milliGRAM(s) Chew two times a day    LAST 24-HR VITALS:  T(C): 36.6 (10-26-21 @ 09:42), Max: 36.6 (10-26-21 @ 09:42)  HR: 67 (10-26-21 @ 09:42) (67 - 67)  BP: 132/90 (10-26-21 @ 09:42) (132/90 - 137/78)  ABP: --  RR: 18 (10-26-21 @ 09:42) (18 - 18)  SpO2: 96% (10-26-21 @ 09:42) (96% - 98%)  CVP(cm H2O): --    GENERAL PHYSICAL EXAM:  GEN: no distress   HEENT: NCAT, OP clear  EYES: sclera white, conjunctiva clear, no nystagmus  NECK: supple  CV: RRR, no murmur     		  PULM: CTAB, no wheezing  ABD: soft, +BS, NT  EXT: peripheral pulse intact, no cyanosis  MSK: muscle tone and strength normal  SKIN: warm, dry    NEUROLOGICAL EXAM:  Mental Status  Orientation: alert and oriented to person, place, time, and situation   Language: clear and fluent    Cranial Nerves  II: full visual fields intact   III, IV, VI: PERRL, EOMI  V, VII: facial sensation and movement intact and symmetric   VIII: hearing intact   IX, X: uvula midline, soft palate elevates normally   XI: BL shoulder shrug intact   XII: tongue midline    Motor  5/5 BUE and BLE                 Tone and bulk are normal in upper and lower limbs  No pronator drift    Sensation  Intact to light touch and pinprick in all 4 EXTs    Reflex  2+ in BL biceps and patella                                    Plantar responses downward bilaterally    Coordination  Normal FTN bilaterally    Gait  Deferred       LABS:  Phenytoin Level, Serum: 6.4 ug/mL [10.0 - 20.0] (10-25-21 @ 11:43)  Valproic Acid Level, Serum: 94.5 ug/mL [50.0 - 100.0] (10-25-21 @ 11:43)                        15.9   6.37  )-----------( 223      ( 25 Oct 2021 11:43 )             48.3     10-25    140  |  103  |  8.7  ----------------------------<  88  4.3   |  23.0  |  0.70    Ca    9.3      25 Oct 2021 11:43    TPro  6.8  /  Alb  4.3  /  TBili  0.3<L>  /  DBili  x   /  AST  20  /  ALT  30  /  AlkPhos  68  10-25    CAPILLARY BLOOD GLUCOSE        LIVER FUNCTIONS - ( 25 Oct 2021 11:43 )  Alb: 4.3 g/dL / Pro: 6.8 g/dL / ALK PHOS: 68 U/L / ALT: 30 U/L / AST: 20 U/L / GGT: x                 OTHER IMAGING AND STUDIES:    [facility] 10/25 - 10/26/21:  EEG Summary:  Abnormal EEG in the awake, drowsy and asleep states.  - Occasional right temporal (max F8/F10/T10) sharp wave discharges.  - Rare left temporal (max F7/F9/T9) sharp wave discharges.    Impression/Clinical Correlate:  10/25 – 10/26: No events or seizures were recorded.    Interictal findings suggest potential epileptogenic foci in the bitemporal regions.  
                                                                     [facility]                                                                     MD Surekha Clark DO                                              Epilepsy Consult #: [phone] ([phone])                                               Office: [address] [place] [address], [address]                                                 Phone: [phone]; Fax: [phone]                            ==============================================    EPILEPSY FOLLOW-UP NOTE      INTERVAL HISTORY:  No event overnight.     MEDICATIONS  (STANDING):  amLODIPine   Tablet 10 milliGRAM(s) Oral daily  diVALproex DR 1000 milliGRAM(s) Oral two times a day  enoxaparin Injectable 40 milliGRAM(s) SubCutaneous daily  lacosamide 200 milliGRAM(s) Oral two times a day    Vital Signs Last 24 Hrs  T(C): 36.3 (10-27-21 @ 06:24), Max: 36.9 (10-26-21 @ 17:00)  T(F): 97.4 (10-27-21 @ 06:24), Max: 98.4 (10-26-21 @ 17:00)  HR: 98 (10-27-21 @ 06:24) (74 - 98)  BP: 134/79 (10-27-21 @ 06:24) (131/90 - 134/90)  BP(mean): --  RR: 18 (10-27-21 @ 06:24) (18 - 18)  SpO2: 97% (10-27-21 @ 06:24) (96% - 97%)      GENERAL PHYSICAL EXAM:  GEN: no distress   HEENT: NCAT, OP clear  EYES: sclera white, conjunctiva clear, no nystagmus  NECK: supple  CV: RRR, no murmur     		  PULM: CTAB, no wheezing  ABD: soft, +BS, NT  EXT: peripheral pulse intact, no cyanosis  MSK: muscle tone and strength normal  SKIN: warm, dry    NEUROLOGICAL EXAM:  Mental Status  Orientation: alert and oriented to person, place, time, and situation   Language: clear and fluent    Cranial Nerves  II: full visual fields intact   III, IV, VI: PERRL, EOMI  V, VII: facial sensation and movement intact and symmetric   VIII: hearing intact   IX, X: uvula midline, soft palate elevates normally   XI: BL shoulder shrug intact   XII: tongue midline    Motor  5/5 BUE and BLE                 Tone and bulk are normal in upper and lower limbs  No pronator drift    Sensation  Intact to light touch and pinprick in all 4 EXTs    Reflex  2+ in BL biceps and patella                                    Plantar responses downward bilaterally    Coordination  Normal FTN bilaterally    Gait  Deferred       LABS:  Valproic Acid Level, Serum: 54.6 ug/mL [50.0 - 100.0] (10-27-21 @ 07:36)  Phenytoin Level, Serum: 6.4 ug/mL [10.0 - 20.0] (10-25-21 @ 11:43)  Valproic Acid Level, Serum: 94.5 ug/mL [50.0 - 100.0] (10-25-21 @ 11:43)                          15.9   6.37  )-----------( 223      ( 25 Oct 2021 11:43 )             48.3     10-25    140  |  103  |  8.7  ----------------------------<  88  4.3   |  23.0  |  0.70    Ca    9.3      25 Oct 2021 11:43    TPro  6.8  /  Alb  4.3  /  TBili  0.3<L>  /  DBili  x   /  AST  20  /  ALT  30  /  AlkPhos  68  10-25    CAPILLARY BLOOD GLUCOSE        LIVER FUNCTIONS - ( 25 Oct 2021 11:43 )  Alb: 4.3 g/dL / Pro: 6.8 g/dL / ALK PHOS: 68 U/L / ALT: 30 U/L / AST: 20 U/L / GGT: x                 OTHER IMAGING AND STUDIES:    [facility] 10/25 - 10/27/21:  EEG Summary:  Abnormal EEG in the awake, drowsy and asleep states.  - Occasional right temporal (max F8/F10/T10) sharp wave discharges.  - Rare left temporal (max F7/F9/T9) sharp wave discharges.    Impression/Clinical Correlate:  10/25 – 10/26: No events or seizures were recorded.  10/26 – 10/27: No events or seizures were recorded.    During this EMU admission, no events or seizures were recorded. Interictal findings suggest potential epileptogenic foci in the bitemporal regions.
Progress Note  Psychotherapy Provided [facility]: Individual Psychotherapy 50 minutes provided today  Goals addressed in session:   1-3 D: Met with pt for Individual Therapy session  Elizabeth spoke today about her feelings re: her list of daily tasks that she erases and re-writes  The accompanying anxiety was discussed and concerns for her dog were also validated  A: Elizabeth was less anxious and depressed today as the above was discussed, however it is possible that she attempts to "put on a good face" for this worker  P: Upcoming sessions will be used to continue to address the above  She will continue to attend both weekly Trauma and ED group therapy sessions  Pain Scale and Suicide Risk [facility]: On a scale of 0 to 10, the patient rates current pain at 4   Behavioral Health Treatment Plan [facility]: Diagnosis and Treatment Plan explained to patient, patient relates understanding diagnosis and is agreeable to Treatment Plan            Signatures   Electronically signed by : Neel Knox LCSW; Jan 26 2017  8:54AM EST                       (Author)
Treatment Plan Tracking    #1 Treatment Plan not completed within required time limits due to: Client presented with emotional/behavioral issues that required clinical intervention            Signatures   Electronically signed by : Atul Leary LCSW; Jan 26 2017 10:13AM EST                       (Author)
CC: Follow up    INTERVAL HPI/OVERNIGHT EVENTS: Patient seen and examined, no acute complaints overnight.       Vital Signs Last 24 Hrs  T(C): 36.5 (27 Oct 2021 10:57), Max: 36.9 (26 Oct 2021 17:00)  T(F): 97.7 (27 Oct 2021 10:57), Max: 98.4 (26 Oct 2021 17:00)  HR: 73 (27 Oct 2021 10:57) (73 - 98)  BP: 115/70 (27 Oct 2021 10:57) (115/70 - 134/90)  BP(mean): --  RR: 20 (27 Oct 2021 10:57) (18 - 20)  SpO2: 97% (27 Oct 2021 10:57) (96% - 97%)    PHYSICAL EXAM:    GENERAL: NAD, AOX3  HEAD:  Atraumatic, Normocephalic  EYES: conjunctiva and sclera clear  ENMT: Moist mucous membranes  CHEST/LUNG: Clear to auscultation bilaterally; No rales, rhonchi, wheezing, or rubs  HEART: Regular rate and rhythm; No murmurs, rubs, or gallops  ABDOMEN: Soft, Nontender, Nondistended; Bowel sounds present  EXTREMITIES:  2+ Peripheral Pulses, No clubbing, cyanosis, or edema        MEDICATIONS  (STANDING):  amLODIPine   Tablet 10 milliGRAM(s) Oral daily  diVALproex DR 1000 milliGRAM(s) Oral two times a day  enoxaparin Injectable 40 milliGRAM(s) SubCutaneous daily  lacosamide 200 milliGRAM(s) Oral two times a day    MEDICATIONS  (PRN):  acetaminophen     Tablet .. 650 milliGRAM(s) Oral every 6 hours PRN Temp greater or equal to 38C (100.4F), Mild Pain (1 - 3)  aluminum hydroxide/magnesium hydroxide/simethicone Suspension 30 milliLiter(s) Oral every 4 hours PRN Dyspepsia  melatonin 3 milliGRAM(s) Oral at bedtime PRN Insomnia  ondansetron Injectable 4 milliGRAM(s) IV Push every 8 hours PRN Nausea and/or Vomiting      Allergies    No Known Allergies    Intolerances          LABS:                          15.9   6.37  )-----------( 223      ( 25 Oct 2021 11:43 )             48.3     10-25    140  |  103  |  8.7  ----------------------------<  88  4.3   |  23.0  |  0.70    Ca    9.3      25 Oct 2021 11:43    TPro  6.8  /  Alb  4.3  /  TBili  0.3<L>  /  DBili  x   /  AST  20  /  ALT  30  /  AlkPhos  68  10-25          RADIOLOGY & ADDITIONAL TESTS:  
CC: Follow up    INTERVAL HPI/OVERNIGHT EVENTS: Patient seen and examined, no acute events overnight.     Vital Signs Last 24 Hrs  T(C): 36.6 (26 Oct 2021 09:42), Max: 36.6 (26 Oct 2021 09:42)  T(F): 97.9 (26 Oct 2021 09:42), Max: 97.9 (26 Oct 2021 09:42)  HR: 67 (26 Oct 2021 09:42) (67 - 70)  BP: 132/90 (26 Oct 2021 09:42) (127/85 - 137/78)  BP(mean): --  RR: 18 (26 Oct 2021 09:42) (18 - 18)  SpO2: 96% (26 Oct 2021 09:42) (96% - 98%)    PHYSICAL EXAM:    GENERAL: NAD,AOX3  HEAD:  EEG leads  EYES: EOMI, PERRLA, conjunctiva and sclera clear  ENMT: Moist mucous membranes  NECK: Supple, No JVD  CHEST/LUNG: Clear to auscultation bilaterally; No rales, rhonchi, wheezing, or rubs  HEART: Regular rate and rhythm; No murmurs, rubs, or gallops  ABDOMEN: Soft, Nontender, Nondistended; Bowel sounds present  EXTREMITIES:  2+ Peripheral Pulses, No clubbing, cyanosis, or edema        MEDICATIONS  (STANDING):  amLODIPine   Tablet 10 milliGRAM(s) Oral daily  diVALproex DR 1000 milliGRAM(s) Oral two times a day  enoxaparin Injectable 40 milliGRAM(s) SubCutaneous daily  lacosamide 200 milliGRAM(s) Oral two times a day  phenytoin   Chewable 50 milliGRAM(s) Chew two times a day    MEDICATIONS  (PRN):  acetaminophen     Tablet .. 650 milliGRAM(s) Oral every 6 hours PRN Temp greater or equal to 38C (100.4F), Mild Pain (1 - 3)  aluminum hydroxide/magnesium hydroxide/simethicone Suspension 30 milliLiter(s) Oral every 4 hours PRN Dyspepsia  melatonin 3 milliGRAM(s) Oral at bedtime PRN Insomnia  ondansetron Injectable 4 milliGRAM(s) IV Push every 8 hours PRN Nausea and/or Vomiting      Allergies    No Known Allergies    Intolerances          LABS:                          15.9   6.37  )-----------( 223      ( 25 Oct 2021 11:43 )             48.3     10-25    140  |  103  |  8.7  ----------------------------<  88  4.3   |  23.0  |  0.70    Ca    9.3      25 Oct 2021 11:43    TPro  6.8  /  Alb  4.3  /  TBili  0.3<L>  /  DBili  x   /  AST  20  /  ALT  30  /  AlkPhos  68  10-25          RADIOLOGY & ADDITIONAL TESTS:

## 2021-10-28 RX ORDER — RIMEGEPANT SULFATE 75 MG/75MG
75 TABLET, ORALLY DISINTEGRATING ORAL EVERY OTHER DAY
Qty: 15 TABLET | Refills: 3 | Status: SHIPPED | OUTPATIENT
Start: 2021-10-28 | End: 2022-04-11

## 2021-11-02 DIAGNOSIS — K21.9 GASTROESOPHAGEAL REFLUX DISEASE WITHOUT ESOPHAGITIS: ICD-10-CM

## 2021-11-02 RX ORDER — OMEPRAZOLE 20 MG/1
CAPSULE, DELAYED RELEASE ORAL
Qty: 180 CAPSULE | Refills: 1 | Status: SHIPPED | OUTPATIENT
Start: 2021-11-02 | End: 2022-04-25

## 2021-11-03 ENCOUNTER — OFFICE VISIT (OUTPATIENT)
Dept: BARIATRICS | Facility: CLINIC | Age: 36
End: 2021-11-03

## 2021-11-03 VITALS — BODY MASS INDEX: 45.99 KG/M2 | HEIGHT: 67 IN | WEIGHT: 293 LBS

## 2021-11-03 DIAGNOSIS — R63.5 ABNORMAL WEIGHT GAIN: ICD-10-CM

## 2021-11-03 PROCEDURE — RECHECK: Performed by: DIETITIAN, REGISTERED

## 2021-11-03 PROCEDURE — WEIGHT: Performed by: DIETITIAN, REGISTERED

## 2021-11-04 ENCOUNTER — CONSULT (OUTPATIENT)
Dept: NEPHROLOGY | Facility: CLINIC | Age: 36
End: 2021-11-04
Payer: COMMERCIAL

## 2021-11-04 VITALS
SYSTOLIC BLOOD PRESSURE: 132 MMHG | HEART RATE: 84 BPM | BODY MASS INDEX: 45.99 KG/M2 | WEIGHT: 293 LBS | OXYGEN SATURATION: 99 % | DIASTOLIC BLOOD PRESSURE: 70 MMHG | HEIGHT: 67 IN

## 2021-11-04 DIAGNOSIS — E87.70 HYPERVOLEMIA, UNSPECIFIED HYPERVOLEMIA TYPE: ICD-10-CM

## 2021-11-04 DIAGNOSIS — N39.41 URGENCY INCONTINENCE: Primary | ICD-10-CM

## 2021-11-04 DIAGNOSIS — E55.9 VITAMIN D DEFICIENCY: ICD-10-CM

## 2021-11-04 DIAGNOSIS — R35.89 POLYURIA: ICD-10-CM

## 2021-11-04 DIAGNOSIS — E66.01 MORBID OBESITY (HCC): ICD-10-CM

## 2021-11-04 LAB
SL AMB  POCT GLUCOSE, UA: NEGATIVE
SL AMB LEUKOCYTE ESTERASE,UA: NEGATIVE
SL AMB POCT BILIRUBIN,UA: NEGATIVE
SL AMB POCT BLOOD,UA: NEGATIVE
SL AMB POCT CLARITY,UA: CLEAR
SL AMB POCT COLOR,UA: NORMAL
SL AMB POCT KETONES,UA: NEGATIVE
SL AMB POCT NITRITE,UA: NEGATIVE
SL AMB POCT PH,UA: 5
SL AMB POCT SPECIFIC GRAVITY,UA: 1.02
SL AMB POCT URINE PROTEIN: NEGATIVE
SL AMB POCT UROBILINOGEN: 0.2

## 2021-11-04 PROCEDURE — 99204 OFFICE O/P NEW MOD 45 MIN: CPT | Performed by: INTERNAL MEDICINE

## 2021-11-04 PROCEDURE — 81002 URINALYSIS NONAUTO W/O SCOPE: CPT | Performed by: INTERNAL MEDICINE

## 2021-11-04 PROCEDURE — 1036F TOBACCO NON-USER: CPT | Performed by: INTERNAL MEDICINE

## 2021-11-04 PROCEDURE — 3008F BODY MASS INDEX DOCD: CPT | Performed by: INTERNAL MEDICINE

## 2021-11-04 RX ORDER — POTASSIUM CHLORIDE 750 MG/1
10 TABLET, EXTENDED RELEASE ORAL DAILY
Qty: 90 TABLET | Refills: 3 | Status: SHIPPED | OUTPATIENT
Start: 2021-11-04 | End: 2022-07-06

## 2021-11-04 RX ORDER — HYDROCHLOROTHIAZIDE 25 MG/1
25 TABLET ORAL DAILY
Qty: 90 TABLET | Refills: 4 | Status: SHIPPED | OUTPATIENT
Start: 2021-11-04 | End: 2021-11-23

## 2021-11-04 RX ORDER — RIMEGEPANT SULFATE 75 MG/75MG
75 TABLET, ORALLY DISINTEGRATING ORAL
COMMUNITY
End: 2022-04-12 | Stop reason: SDUPTHER

## 2021-11-08 ENCOUNTER — TELEPHONE (OUTPATIENT)
Dept: NEUROLOGY | Facility: CLINIC | Age: 36
End: 2021-11-08

## 2021-11-09 ENCOUNTER — TELEPHONE (OUTPATIENT)
Dept: INTERNAL MEDICINE CLINIC | Facility: CLINIC | Age: 36
End: 2021-11-09

## 2021-11-09 DIAGNOSIS — B34.9 VIRAL INFECTION, UNSPECIFIED: Primary | ICD-10-CM

## 2021-11-09 PROCEDURE — U0003 INFECTIOUS AGENT DETECTION BY NUCLEIC ACID (DNA OR RNA); SEVERE ACUTE RESPIRATORY SYNDROME CORONAVIRUS 2 (SARS-COV-2) (CORONAVIRUS DISEASE [COVID-19]), AMPLIFIED PROBE TECHNIQUE, MAKING USE OF HIGH THROUGHPUT TECHNOLOGIES AS DESCRIBED BY CMS-2020-01-R: HCPCS | Performed by: PHYSICIAN ASSISTANT

## 2021-11-09 PROCEDURE — U0005 INFEC AGEN DETEC AMPLI PROBE: HCPCS | Performed by: PHYSICIAN ASSISTANT

## 2021-11-13 ENCOUNTER — APPOINTMENT (OUTPATIENT)
Dept: LAB | Facility: MEDICAL CENTER | Age: 36
End: 2021-11-13
Payer: COMMERCIAL

## 2021-11-13 DIAGNOSIS — R35.89 POLYURIA: ICD-10-CM

## 2021-11-13 DIAGNOSIS — N39.41 URGENCY INCONTINENCE: ICD-10-CM

## 2021-11-13 DIAGNOSIS — E87.70 HYPERVOLEMIA, UNSPECIFIED HYPERVOLEMIA TYPE: ICD-10-CM

## 2021-11-13 LAB
ANION GAP SERPL CALCULATED.3IONS-SCNC: 7 MMOL/L (ref 4–13)
BACTERIA UR QL AUTO: ABNORMAL /HPF
BILIRUB UR QL STRIP: NEGATIVE
BUN SERPL-MCNC: 18 MG/DL (ref 5–25)
CALCIUM SERPL-MCNC: 9.4 MG/DL (ref 8.3–10.1)
CHLORIDE SERPL-SCNC: 100 MMOL/L (ref 100–108)
CLARITY UR: ABNORMAL
CO2 SERPL-SCNC: 27 MMOL/L (ref 21–32)
COLOR UR: YELLOW
CORTIS AM PEAK SERPL-MCNC: 9.1 UG/DL (ref 4.2–22.4)
CREAT SERPL-MCNC: 0.86 MG/DL (ref 0.6–1.3)
CREAT UR-MCNC: 258 MG/DL
CREAT UR-MCNC: 258 MG/DL
GFR SERPL CREATININE-BSD FRML MDRD: 87 ML/MIN/1.73SQ M
GLUCOSE P FAST SERPL-MCNC: 104 MG/DL (ref 65–99)
GLUCOSE UR STRIP-MCNC: NEGATIVE MG/DL
HGB UR QL STRIP.AUTO: NEGATIVE
KETONES UR STRIP-MCNC: NEGATIVE MG/DL
LEUKOCYTE ESTERASE UR QL STRIP: ABNORMAL
MICROALBUMIN UR-MCNC: 28.4 MG/L (ref 0–20)
MICROALBUMIN/CREAT 24H UR: 11 MG/G CREATININE (ref 0–30)
MUCOUS THREADS UR QL AUTO: ABNORMAL
NITRITE UR QL STRIP: NEGATIVE
NON-SQ EPI CELLS URNS QL MICRO: ABNORMAL /HPF
OSMOLALITY UR/SERPL-RTO: 294 MMOL/KG (ref 282–298)
PH UR STRIP.AUTO: 6 [PH]
POTASSIUM SERPL-SCNC: 3.2 MMOL/L (ref 3.5–5.3)
PROT UR STRIP-MCNC: ABNORMAL MG/DL
PROT UR-MCNC: 28 MG/DL
PROT/CREAT UR: 0.11 MG/G{CREAT} (ref 0–0.1)
RBC #/AREA URNS AUTO: ABNORMAL /HPF
SODIUM 24H UR-SCNC: 22 MOL/L
SODIUM SERPL-SCNC: 134 MMOL/L (ref 136–145)
SP GR UR STRIP.AUTO: 1.03 (ref 1–1.03)
URATE SERPL-MCNC: 8.2 MG/DL (ref 2–6.8)
UROBILINOGEN UR QL STRIP.AUTO: 0.2 E.U./DL
UUN 24H UR-MCNC: 1355 MG/DL
WBC #/AREA URNS AUTO: ABNORMAL /HPF

## 2021-11-13 PROCEDURE — 84550 ASSAY OF BLOOD/URIC ACID: CPT

## 2021-11-13 PROCEDURE — 82533 TOTAL CORTISOL: CPT

## 2021-11-13 PROCEDURE — 81001 URINALYSIS AUTO W/SCOPE: CPT

## 2021-11-13 PROCEDURE — 36415 COLL VENOUS BLD VENIPUNCTURE: CPT

## 2021-11-13 PROCEDURE — 82043 UR ALBUMIN QUANTITATIVE: CPT

## 2021-11-13 PROCEDURE — 84156 ASSAY OF PROTEIN URINE: CPT

## 2021-11-13 PROCEDURE — 82570 ASSAY OF URINE CREATININE: CPT

## 2021-11-13 PROCEDURE — 84300 ASSAY OF URINE SODIUM: CPT

## 2021-11-13 PROCEDURE — 83930 ASSAY OF BLOOD OSMOLALITY: CPT

## 2021-11-13 PROCEDURE — 80048 BASIC METABOLIC PNL TOTAL CA: CPT

## 2021-11-13 PROCEDURE — 83935 ASSAY OF URINE OSMOLALITY: CPT

## 2021-11-13 PROCEDURE — 82088 ASSAY OF ALDOSTERONE: CPT

## 2021-11-13 PROCEDURE — 84540 ASSAY OF URINE/UREA-N: CPT

## 2021-11-15 ENCOUNTER — TELEPHONE (OUTPATIENT)
Dept: NEPHROLOGY | Facility: CLINIC | Age: 36
End: 2021-11-15

## 2021-11-15 LAB — OSMOLALITY UR: 770 MMOL/KG

## 2021-11-16 DIAGNOSIS — N39.0 URINARY TRACT INFECTION WITHOUT HEMATURIA, SITE UNSPECIFIED: Primary | ICD-10-CM

## 2021-11-16 RX ORDER — CEPHALEXIN 500 MG/1
500 CAPSULE ORAL EVERY 8 HOURS SCHEDULED
Qty: 15 CAPSULE | Refills: 0 | Status: SHIPPED | OUTPATIENT
Start: 2021-11-16 | End: 2021-11-21

## 2021-11-18 LAB — ALDOST SERPL-MCNC: 25.8 NG/DL (ref 0–30)

## 2021-11-22 DIAGNOSIS — K59.09 CHRONIC CONSTIPATION: ICD-10-CM

## 2021-11-22 RX ORDER — LINACLOTIDE 290 UG/1
CAPSULE, GELATIN COATED ORAL
Qty: 30 CAPSULE | Refills: 5 | Status: SHIPPED | OUTPATIENT
Start: 2021-11-22 | End: 2022-05-07

## 2021-11-23 ENCOUNTER — TELEPHONE (OUTPATIENT)
Dept: NEPHROLOGY | Facility: CLINIC | Age: 36
End: 2021-11-23

## 2021-11-23 DIAGNOSIS — I10 HYPERTENSION, UNSPECIFIED TYPE: Primary | ICD-10-CM

## 2021-11-23 RX ORDER — SPIRONOLACTONE 25 MG/1
25 TABLET ORAL DAILY
Qty: 30 TABLET | Refills: 4 | Status: SHIPPED | OUTPATIENT
Start: 2021-11-23 | End: 2022-02-15

## 2021-12-04 ENCOUNTER — APPOINTMENT (OUTPATIENT)
Dept: LAB | Facility: MEDICAL CENTER | Age: 36
End: 2021-12-04
Payer: COMMERCIAL

## 2021-12-04 DIAGNOSIS — N39.41 URGENCY INCONTINENCE: ICD-10-CM

## 2021-12-04 DIAGNOSIS — I10 HYPERTENSION, UNSPECIFIED TYPE: ICD-10-CM

## 2021-12-04 DIAGNOSIS — E87.70 HYPERVOLEMIA, UNSPECIFIED HYPERVOLEMIA TYPE: ICD-10-CM

## 2021-12-04 PROCEDURE — 82570 ASSAY OF URINE CREATININE: CPT

## 2021-12-04 PROCEDURE — 81001 URINALYSIS AUTO W/SCOPE: CPT

## 2021-12-04 PROCEDURE — 36415 COLL VENOUS BLD VENIPUNCTURE: CPT

## 2021-12-04 PROCEDURE — 83935 ASSAY OF URINE OSMOLALITY: CPT | Performed by: INTERNAL MEDICINE

## 2021-12-04 PROCEDURE — 84540 ASSAY OF URINE/UREA-N: CPT | Performed by: INTERNAL MEDICINE

## 2021-12-04 PROCEDURE — 80048 BASIC METABOLIC PNL TOTAL CA: CPT

## 2021-12-05 LAB
ANION GAP SERPL CALCULATED.3IONS-SCNC: 4 MMOL/L (ref 4–13)
BACTERIA UR QL AUTO: ABNORMAL /HPF
BILIRUB UR QL STRIP: NEGATIVE
BUN SERPL-MCNC: 17 MG/DL (ref 5–25)
CALCIUM SERPL-MCNC: 8.9 MG/DL (ref 8.3–10.1)
CHLORIDE SERPL-SCNC: 109 MMOL/L (ref 100–108)
CLARITY UR: ABNORMAL
CO2 SERPL-SCNC: 25 MMOL/L (ref 21–32)
COLOR UR: YELLOW
CREAT SERPL-MCNC: 0.88 MG/DL (ref 0.6–1.3)
CREAT UR-MCNC: 220 MG/DL
GFR SERPL CREATININE-BSD FRML MDRD: 84 ML/MIN/1.73SQ M
GLUCOSE P FAST SERPL-MCNC: 88 MG/DL (ref 65–99)
GLUCOSE UR STRIP-MCNC: NEGATIVE MG/DL
HGB UR QL STRIP.AUTO: NEGATIVE
HYALINE CASTS #/AREA URNS LPF: ABNORMAL /LPF
KETONES UR STRIP-MCNC: NEGATIVE MG/DL
LEUKOCYTE ESTERASE UR QL STRIP: ABNORMAL
NITRITE UR QL STRIP: NEGATIVE
NON-SQ EPI CELLS URNS QL MICRO: ABNORMAL /HPF
OSMOLALITY UR: 837 MMOL/KG
PH UR STRIP.AUTO: 7 [PH]
POTASSIUM SERPL-SCNC: 4.3 MMOL/L (ref 3.5–5.3)
PROT UR STRIP-MCNC: ABNORMAL MG/DL
RBC #/AREA URNS AUTO: ABNORMAL /HPF
SODIUM SERPL-SCNC: 138 MMOL/L (ref 136–145)
SP GR UR STRIP.AUTO: 1.03 (ref 1–1.03)
UROBILINOGEN UR QL STRIP.AUTO: 0.2 E.U./DL
UUN 24H UR-MCNC: 1124 MG/DL
WBC #/AREA URNS AUTO: ABNORMAL /HPF

## 2021-12-09 ENCOUNTER — OFFICE VISIT (OUTPATIENT)
Dept: BARIATRICS | Facility: CLINIC | Age: 36
End: 2021-12-09
Payer: COMMERCIAL

## 2021-12-09 VITALS
RESPIRATION RATE: 20 BRPM | HEIGHT: 67 IN | DIASTOLIC BLOOD PRESSURE: 76 MMHG | TEMPERATURE: 97.9 F | HEART RATE: 92 BPM | BODY MASS INDEX: 45.99 KG/M2 | WEIGHT: 293 LBS | SYSTOLIC BLOOD PRESSURE: 138 MMHG

## 2021-12-09 DIAGNOSIS — E03.9 ACQUIRED HYPOTHYROIDISM: ICD-10-CM

## 2021-12-09 DIAGNOSIS — K21.9 GASTROESOPHAGEAL REFLUX DISEASE WITHOUT ESOPHAGITIS: ICD-10-CM

## 2021-12-09 DIAGNOSIS — R73.01 IFG (IMPAIRED FASTING GLUCOSE): ICD-10-CM

## 2021-12-09 DIAGNOSIS — F31.81 BIPOLAR II DISORDER (HCC): ICD-10-CM

## 2021-12-09 DIAGNOSIS — E66.01 MORBID OBESITY (HCC): Primary | ICD-10-CM

## 2021-12-09 PROCEDURE — 1036F TOBACCO NON-USER: CPT | Performed by: PHYSICIAN ASSISTANT

## 2021-12-09 PROCEDURE — 3008F BODY MASS INDEX DOCD: CPT | Performed by: PHYSICIAN ASSISTANT

## 2021-12-09 PROCEDURE — 99214 OFFICE O/P EST MOD 30 MIN: CPT | Performed by: PHYSICIAN ASSISTANT

## 2021-12-09 RX ORDER — LORAZEPAM 0.5 MG/1
TABLET ORAL
COMMUNITY
Start: 2021-11-24

## 2021-12-20 ENCOUNTER — TELEPHONE (OUTPATIENT)
Dept: NEUROLOGY | Facility: CLINIC | Age: 36
End: 2021-12-20

## 2021-12-20 DIAGNOSIS — G43.709 CHRONIC MIGRAINE WITHOUT AURA WITHOUT STATUS MIGRAINOSUS, NOT INTRACTABLE: ICD-10-CM

## 2021-12-20 DIAGNOSIS — E03.9 HYPOTHYROIDISM, UNSPECIFIED TYPE: ICD-10-CM

## 2021-12-20 RX ORDER — GALCANEZUMAB 120 MG/ML
120 INJECTION, SOLUTION SUBCUTANEOUS
Qty: 1 ML | Refills: 5 | Status: SHIPPED | OUTPATIENT
Start: 2021-12-20 | End: 2022-06-13

## 2021-12-20 RX ORDER — LEVOTHYROXINE SODIUM 0.12 MG/1
TABLET ORAL
Qty: 90 TABLET | Refills: 0 | Status: SHIPPED | OUTPATIENT
Start: 2021-12-20 | End: 2022-03-19 | Stop reason: SDUPTHER

## 2021-12-21 ENCOUNTER — RA CDI HCC (OUTPATIENT)
Dept: OTHER | Facility: HOSPITAL | Age: 36
End: 2021-12-21

## 2021-12-28 ENCOUNTER — TELEPHONE (OUTPATIENT)
Dept: NEUROLOGY | Facility: CLINIC | Age: 36
End: 2021-12-28

## 2022-01-05 ENCOUNTER — OFFICE VISIT (OUTPATIENT)
Dept: NEPHROLOGY | Facility: CLINIC | Age: 37
End: 2022-01-05
Payer: COMMERCIAL

## 2022-01-05 VITALS
WEIGHT: 293 LBS | SYSTOLIC BLOOD PRESSURE: 130 MMHG | BODY MASS INDEX: 63.12 KG/M2 | OXYGEN SATURATION: 99 % | HEART RATE: 80 BPM | DIASTOLIC BLOOD PRESSURE: 70 MMHG

## 2022-01-05 DIAGNOSIS — E87.70 HYPERVOLEMIA, UNSPECIFIED HYPERVOLEMIA TYPE: Primary | ICD-10-CM

## 2022-01-05 DIAGNOSIS — G47.33 OSA (OBSTRUCTIVE SLEEP APNEA): ICD-10-CM

## 2022-01-05 DIAGNOSIS — N32.81 OVERACTIVE BLADDER: ICD-10-CM

## 2022-01-05 DIAGNOSIS — G43.709 CHRONIC MIGRAINE WITHOUT AURA WITHOUT STATUS MIGRAINOSUS, NOT INTRACTABLE: ICD-10-CM

## 2022-01-05 DIAGNOSIS — I10 PRIMARY HYPERTENSION: Chronic | ICD-10-CM

## 2022-01-05 PROCEDURE — 3078F DIAST BP <80 MM HG: CPT | Performed by: INTERNAL MEDICINE

## 2022-01-05 PROCEDURE — 3075F SYST BP GE 130 - 139MM HG: CPT | Performed by: INTERNAL MEDICINE

## 2022-01-05 PROCEDURE — 1036F TOBACCO NON-USER: CPT | Performed by: INTERNAL MEDICINE

## 2022-01-05 PROCEDURE — 99214 OFFICE O/P EST MOD 30 MIN: CPT | Performed by: INTERNAL MEDICINE

## 2022-01-05 NOTE — ASSESSMENT & PLAN NOTE
She had marked volume overload and diuresed after a migraine  Started furosemide 20mg dialy  Due to a relatively high aldosterone level, spironolactone 25mg was added and she is feeling well

## 2022-01-05 NOTE — PROGRESS NOTES
[facility] [place], [place]    Name: Chris Lugo  YOB: 1985      Assessment/Plan:       Problem List Items Addressed This Visit        Respiratory    CAIO (obstructive sleep apnea)     Not using a CPAP              Cardiovascular and Mediastinum    Hypertension (Chronic)     Blood pressure is controlled         Chronic migraine without aura without status migrainosus, not intractable     Following with neurology and on 3 migraine medications            Genitourinary    Overactive bladder     Controlled            Other    Volume overload - Primary     She had marked volume overload and diuresed after a migraine  Started furosemide 20mg dialy  Due to a relatively high aldosterone level, spironolactone 25mg was added and she is feeling well                 Subjective:      Patient ID: Chris Lugo is a 39 y o  female  HPI  She had migraine through the [place] holidays but did not have volume loss  She did not go to the lab for blood work  She takes 3 drugs to prevent migraine  But has a severe one every few weeks which is an improvement  She went to [facility] and takes Ozempic and metformin with some weight loss  She has not had significant volume loss with migraines with no increased urinary output      She takes furosemide 20mg daily and not on HCTZ    She has lost 25 lbs on the bariatric program    The following portions of the patient's history were reviewed and updated as appropriate: allergies, current medications, past family history, past medical history, past social history, past surgical history and problem list     Review of Systems   Constitutional: Positive for fatigue  Respiratory: Negative for cough and shortness of breath  Has NGUYỄN   Cardiovascular: Negative for chest pain, palpitations and leg swelling  Gastrointestinal: Positive for constipation  Negative for blood in stool and diarrhea     Genitourinary: Negative for decreased urine volume, difficulty urinating, dysuria, frequency, hematuria and urgency  Musculoskeletal: Negative for arthralgias, back pain and neck pain  Gets an occipital headache with the migraine with visual changes   Neurological: Positive for dizziness, light-headedness and headaches  Hematological: Does not bruise/bleed easily  Psychiatric/Behavioral: Negative for dysphoric mood           Social History     Socioeconomic History    Marital status: Single     Spouse name: None    Number of children: None    Years of education: None    Highest education level: None   Occupational History    Occupation: DISABLED   Tobacco Use    Smoking status: Never Smoker    Smokeless tobacco: Never Used   Vaping Use    Vaping Use: Never used   Substance and Sexual Activity    Alcohol use: Not Currently    Drug use: No    Sexual activity: Not Currently     Partners: Female   Other Topics Concern    None   Social History Narrative    Always uses seat belts    Caffeine use     Disabled - permament disability since 2008 due to psychiatric illness    Has no children    Single     Tea     Social Determinants of Health     Financial Resource Strain: Not on file   Food Insecurity: Not on file   Transportation Needs: Not on file   Physical Activity: Not on file   Stress: Not on file   Social Connections: Not on file   Intimate Partner Violence: Not on file   Housing Stability: Not on file     Past Medical History:   Diagnosis Date    Anorexia nervosa in remission     Anxiety     Back pain     Bipolar disorder ([facility] )     Depression     Esophageal reflux 8/15/2013    Hypertension     Hypothyroid     Idiopathic intracranial hypertension     Lumbar degenerative disc disease 5/8/2014    Migraine     Obesity     Obsessive-compulsive disorder     Overactive bladder     Psychiatric disorder     Restless leg syndrome, controlled     Seasonal allergies     Suicide and self-inflicted injury ([facility] )     Transcranial Magnetic Stimulation 09/06/2021     Past Surgical History:   Procedure Laterality Date    DENTAL SURGERY      IR LUMBAR PUNCTURE  2/26/2019    SINUS ENDOSCOPY      TONSILLECTOMY         Current Outpatient Medications:     buPROPion (WELLBUTRIN XL) 300 mg 24 hr tablet, Take 300 mg by mouth daily , Disp: , Rfl:     celecoxib (CeleBREX) 200 mg capsule, Take 1 capsule (200 mg total) by mouth 2 (two) times a day, Disp: 60 capsule, Rfl: 4    Cholecalciferol (VITAMIN D3) 5000 units CAPS, Take 5,000 Units by mouth daily, Disp: , Rfl:     clomiPRAMINE (ANAFRANIL) 50 mg capsule, Take by mouth TAKE 2 CAPSULES IN AM AND 2 CAPSULES IN PM, Disp: , Rfl:     furosemide (LASIX) 40 mg tablet, Take 2 tabs daily for 3 days then reduce back to 1 5 tabs daily   (Patient taking differently: Pt is taking 1 5 tablets in the morning ), Disp: 120 tablet, Rfl: 1    gabapentin (NEURONTIN) 600 MG tablet, TAKE 1/2 TABLET DAILY IN AM AND 1 AND 1/2 TABLET AT BEDTIME, Disp: , Rfl:     Galcanezumab-gnlm (Emgality) 120 MG/ML SOAJ, Inject 120 mg under the skin every 30 (thirty) days, Disp: 1 mL, Rfl: 5    hydrocortisone-acetic acid (VOSOL-HC) otic solution, Administer 3 drops into both ears every 6 (six) hours, Disp: 10 mL, Rfl: 0    levothyroxine 125 mcg tablet, TAKE 1 TABLET BY MOUTH EVERY MORNING, Disp: 90 tablet, Rfl: 0    Linzess 290 MCG CAPS, TAKE 1 CAPSULE BY MOUTH EVERY DAY, Disp: 30 capsule, Rfl: 5    loratadine (CLARITIN) 10 mg tablet, Take 10 mg by mouth daily, Disp: , Rfl:     LORazepam (ATIVAN) 0 5 mg tablet, , Disp: , Rfl:     LORazepam (ATIVAN) 1 mg tablet, Take 1 mg by mouth 3 (three) times a day as needed for anxiety , Disp: , Rfl:     MAGNESIUM PO, Take 1 tablet by mouth 2 (two) times a day , Disp: , Rfl:     metFORMIN (GLUCOPHAGE) 500 mg tablet, TAKE 1 TABLET BY MOUTH TWICE A DAY WITH MEALS, Disp: 60 tablet, Rfl: 1    Multiple Vitamin (MULTIVITAMIN) capsule, Take 1 capsule by mouth daily, Disp: , Rfl:   omeprazole (PriLOSEC) 20 mg delayed release capsule, TAKE 1 CAPSULE BY MOUTH TWICE A DAY, Disp: 180 capsule, Rfl: 1    potassium chloride (K-DUR,KLOR-CON) 10 mEq tablet, Take 1 tablet (10 mEq total) by mouth daily, Disp: 90 tablet, Rfl: 3    RESTASIS 0 05 % ophthalmic emulsion, PLACE 1 DROP INTO BOTH EYES TWICE A DAY, Disp: , Rfl: 3    rOPINIRole (REQUIP) 4 mg tablet, TAKE 1 TABLET BY MOUTH TWICE A DAY (Patient taking differently: TAKE 1 TABLET BY MOUTH DAILY), Disp: 180 tablet, Rfl: 1    Semaglutide,0 25 or 0 5MG/DOS, 2 MG/1 5ML SOPN, Inject 0 25mg subcutaneously once weekly for 4 weeks; then increase to 0 5mg once weekly, Disp: 1 5 mL, Rfl: 1    sertraline (ZOLOFT) 100 mg tablet, Take 200 mg by mouth daily, Disp: , Rfl: 0    spironolactone (ALDACTONE) 25 mg tablet, Take 1 tablet (25 mg total) by mouth daily, Disp: 30 tablet, Rfl: 4    topiramate (TOPAMAX) 100 mg tablet, Take 1 tablet (100 mg total) by mouth daily, Disp: 90 tablet, Rfl: 3    Vraylar 6 MG capsule, Take 6 mg by mouth daily daily, Disp: , Rfl:     Vyvanse 50 MG capsule, Take 50 mg by mouth daily, Disp: , Rfl:     Botulinum Toxin Type A (Botox) 200 units SOLR, Inject as directed every 3 (three) months (Patient not taking: Reported on 9/29/2021), Disp: , Rfl:     hydrocortisone (ANUSOL-HC) 25 mg suppository, Insert 1 suppository (25 mg total) into the rectum 2 (two) times a day (Patient not taking: Reported on 9/24/2021), Disp: 12 suppository, Rfl: 0    Rimegepant Sulfate (Nurtec) 75 MG TBDP, Take 75 mg by mouth every other day    If there is a migraine on a non-Nurtec day, can take a dose and skip the following day (Patient not taking: Reported on 12/9/2021 ), Disp: 15 tablet, Rfl: 3    Rimegepant Sulfate (Nurtec) 75 MG TBDP, Take 75 mg by mouth (Patient not taking: Reported on 12/9/2021 ), Disp: , Rfl:     Current Facility-Administered Medications:     Botulinum Toxin Type A SOLR 200 Units, 200 Units, Injection, Q3 Months, Arlet YANG Doyle, 200 Units at 04/01/21 1453    Botulinum Toxin Type A SOLR 200 Units, 200 Units, Injection, Q3 Months, Fatmata Lui MD, 200 Units at 07/28/21 1211    Lab Results   Component Value Date     10/20/2015    SODIUM 138 12/04/2021    K 4 3 12/04/2021     (H) 12/04/2021    CO2 25 12/04/2021    ANIONGAP 10 10/20/2015    AGAP 4 12/04/2021    BUN 17 12/04/2021    CREATININE 0 88 12/04/2021    GLUC 90 01/04/2021    GLUF 88 12/04/2021    CALCIUM 8 9 12/04/2021    AST 13 06/26/2021    ALT 30 06/26/2021    ALKPHOS 121 (H) 06/26/2021    PROT 7 5 10/20/2015    TP 7 7 06/26/2021    BILITOT 0 41 10/20/2015    TBILI 0 42 06/26/2021    EGFR 84 12/04/2021     Lab Results   Component Value Date    WBC 9 97 01/04/2021    HGB 14 1 01/04/2021    HCT 45 2 01/04/2021    MCV 92 01/04/2021     01/04/2021     Lab Results   Component Value Date    CHOLESTEROL 212 (H) 06/26/2021    CHOLESTEROL 186 10/16/2019    CHOLESTEROL 172 11/26/2018     Lab Results   Component Value Date    HDL 59 06/26/2021    HDL 65 (H) 10/16/2019    HDL 60 11/26/2018     Lab Results   Component Value Date    LDLCALC 133 (H) 06/26/2021    LDLCALC 107 (H) 10/16/2019    LDLCALC 91 11/26/2018     Lab Results   Component Value Date    TRIG 101 06/26/2021    TRIG 68 10/16/2019    TRIG 107 11/26/2018     No results found for: [place], [place]  Lab Results   Component Value Date    [id number] 2 740 06/26/2021     Lab Results   Component Value Date    CALCIUM 8 9 12/04/2021    PHOS 2 8 01/09/2018     No results found for: SPEP, UPEP  No results found for: ANDREW SOW        Objective:      /70   Pulse 80   Wt (!) 183 kg (403 lb)   SpO2 99%   BMI 63 12 kg/m²          Physical Exam  Constitutional:       General: She is not in acute distress  Appearance: She is obese  She is not toxic-appearing  HENT:      Head: Normocephalic and atraumatic        Right Ear: External ear normal       Left Ear: External ear normal    Eyes: Extraocular Movements: Extraocular movements intact  Conjunctiva/sclera: Conjunctivae normal       Pupils: Pupils are equal, round, and reactive to light  Neck:      Vascular: No carotid bruit  Cardiovascular:      Rate and Rhythm: Normal rate and regular rhythm  Pulmonary:      Effort: Pulmonary effort is normal  No respiratory distress  Breath sounds: Normal breath sounds  No wheezing or rales  Musculoskeletal:         General: Normal range of motion  Cervical back: Normal range of motion and neck supple  Right lower leg: No edema  Left lower leg: Edema present  Lymphadenopathy:      Cervical: No cervical adenopathy  Skin:     General: Skin is warm and dry  Neurological:      General: No focal deficit present  Mental Status: She is alert and oriented to person, place, and time  Mental status is at baseline  Psychiatric:         Mood and Affect: Mood normal          Behavior: Behavior normal          Thought Content:  Thought content normal          Judgment: Judgment normal

## 2022-01-14 ENCOUNTER — TELEPHONE (OUTPATIENT)
Dept: NEUROLOGY | Facility: CLINIC | Age: 37
End: 2022-01-14

## 2022-01-14 NOTE — TELEPHONE ENCOUNTER
Patient called to advise prior Shanice Zamorano is needed for nurtec  Last auth  2021, will work on same

## 2022-01-17 ENCOUNTER — TELEPHONE (OUTPATIENT)
Dept: NEUROLOGY | Facility: CLINIC | Age: 37
End: 2022-01-17

## 2022-01-17 NOTE — TELEPHONE ENCOUNTER
Patient called in to report the start of migraine on 1/15  States right now she is feeling the beginning of the migraine with brain fog, fatigue, nausea w/o vomiting, feels "warm" w/o fever, slight light and smell sensitivity  Reports pain is low at this time (would not give me a number out of 10) but knows this is the start of a severe migraine  Notes this is her typical migraine  Patient reports she had frequent urination over the weekend and not sure if the fluid loss has something to do with this  Taking:   Nurtec 75 mg every other day   Lasix 20 mg once daily  Celebrex 200 mg 1 tab BID  Topamax 100 mg daily   Emgality monthly     Patient took Nurtec yesterday, but not the day before  I advised she take a dose of Nurtec today and skip tomorrow  She will need auth done as this is her last dose (will follow up in 1/14 telephone encounter for this)  Asking what else to do in the meantime  Preferred pharm [facility] [place]        Best call back [phone], okay with detailed messages

## 2022-01-17 NOTE — TELEPHONE ENCOUNTER
Definitely agree with using the extra dose of Nurtec today  She should also take 25mg of Benadryl tonight and increase her fluid intake  I know that she also takes lasix with some variability in the dose, If she is not taking a minimum dose at this time she may want to decrease a bit  If she wants to come into the office for a dose of Toradol that would be fine but she takes Celebrex twice daily and should hold at least the evening dose tonight if she does that and will need to hydrate to help protect her kidneys

## 2022-01-17 NOTE — TELEPHONE ENCOUNTER
Called patient and read Dr Rylee Rowan message  She verbalizes understanding  She cannot make it in for toradol today  She will try the benadryl tonight and call back in tomorrow to update us on how she feels

## 2022-01-18 NOTE — TELEPHONE ENCOUNTER
Recd call from veronica valerio approved 1/18/2022 - lifetime     18tablets/30 days qty limit    I called patient to advise; reached vm, left message that medication was approved please call pharmacy to fill

## 2022-01-27 ENCOUNTER — TELEPHONE (OUTPATIENT)
Dept: NEUROLOGY | Facility: CLINIC | Age: 37
End: 2022-01-27

## 2022-01-27 NOTE — TELEPHONE ENCOUNTER
Patient of Dr Glenford Brunner    Patient called to advise she is 10 days late her with emgality injection  Initially she was unsure if she took or not but reasoned out she would not have one in the fridge (which she does) if she took  We discussed she could call pharmacy to check when dispensed but she was comfortable that she did not take  She said she is feeling well  Discussed then to take injection today, 10 days late , acknowledged, then inject every 30 days thereafter  All questions/concerns addressed at this time

## 2022-02-03 ENCOUNTER — OFFICE VISIT (OUTPATIENT)
Dept: BARIATRICS | Facility: CLINIC | Age: 37
End: 2022-02-03
Payer: COMMERCIAL

## 2022-02-03 VITALS
SYSTOLIC BLOOD PRESSURE: 122 MMHG | BODY MASS INDEX: 45.99 KG/M2 | HEIGHT: 67 IN | OXYGEN SATURATION: 98 % | WEIGHT: 293 LBS | TEMPERATURE: 97.9 F | RESPIRATION RATE: 20 BRPM | HEART RATE: 97 BPM | DIASTOLIC BLOOD PRESSURE: 80 MMHG

## 2022-02-03 DIAGNOSIS — R73.03 PREDIABETES: ICD-10-CM

## 2022-02-03 DIAGNOSIS — E66.01 MORBID OBESITY (HCC): Primary | ICD-10-CM

## 2022-02-03 DIAGNOSIS — F31.81 BIPOLAR II DISORDER (HCC): ICD-10-CM

## 2022-02-03 DIAGNOSIS — E03.9 ACQUIRED HYPOTHYROIDISM: ICD-10-CM

## 2022-02-03 DIAGNOSIS — R73.01 IFG (IMPAIRED FASTING GLUCOSE): ICD-10-CM

## 2022-02-03 DIAGNOSIS — K21.9 GASTROESOPHAGEAL REFLUX DISEASE WITHOUT ESOPHAGITIS: ICD-10-CM

## 2022-02-03 PROCEDURE — 1036F TOBACCO NON-USER: CPT | Performed by: PHYSICIAN ASSISTANT

## 2022-02-03 PROCEDURE — 3074F SYST BP LT 130 MM HG: CPT | Performed by: PHYSICIAN ASSISTANT

## 2022-02-03 PROCEDURE — 99214 OFFICE O/P EST MOD 30 MIN: CPT | Performed by: PHYSICIAN ASSISTANT

## 2022-02-03 PROCEDURE — 3008F BODY MASS INDEX DOCD: CPT | Performed by: PHYSICIAN ASSISTANT

## 2022-02-03 PROCEDURE — 3079F DIAST BP 80-89 MM HG: CPT | Performed by: PHYSICIAN ASSISTANT

## 2022-02-03 NOTE — ASSESSMENT & PLAN NOTE
-Patient is pursuing Conservative Program  -Initial weight loss goal of 5-10% weight loss for improved health  -Screening labs: reviewed, up to date  -met with RD via televisit, not interested in further visits  -dietary/lifestyle changes, continue to work with [facility]  -On Topamax and Wellbutrin already   -avoid Phentermine  -Started on Ozempic and tolerating 0 5mg dose   Hold off on dose increase for now as she is experiencing some intermittent nausea    Initial: 421 lbs  Current: 397 4 lbs  Change: -23 6 lbs  Goal: wants to feel healthy

## 2022-02-03 NOTE — ASSESSMENT & PLAN NOTE
-On Metformin 500mg daily  -On Ozempic 0 5mg daily  -Update HgbA1c prior to next appt per patient request  -TOlerating Well  - Denies personal hx of pancreatitis or family hx of MEN 2/MTC

## 2022-02-03 NOTE — PROGRESS NOTES
Assessment/Plan: Morbid obesity ([place] [address] )  -Patient is pursuing Conservative Program  -Initial weight loss goal of 5-10% weight loss for improved health  -Screening labs: reviewed, up to date  -met with RD via televisit, not interested in further visits  -dietary/lifestyle changes, continue to work with [facility]  -On Topamax and Wellbutrin already   -avoid Phentermine  -Started on Ozempic and tolerating 0 5mg dose  Hold off on dose increase for now as she is experiencing some intermittent nausea    Initial: 421 lbs  Current: 397 4 lbs  Change: -23 6 lbs  Goal: wants to feel healthy    IFG (impaired fasting glucose)  -On Metformin 500mg daily  -On Ozempic 0 5mg daily  -Update HgbA1c prior to next appt per patient request  -TOlerating Well  - Denies personal hx of pancreatitis or family hx of MEN 2/MTC    Bipolar II disorder ([place] [address] )  -followed by psychiatry  -hx Binge eating - on Vyvanse    Hypothyroidism  -on levothyroxine  -TSH WNL    Esophageal reflux  -On PPI  -avoid food triggers, large portion sizes  -may improve with weight loss    Goals:    Food log (ie ) www myfitnesspal com,sparkpeople  com,loseit com,calorieking  com,etc    No sugary beverages  At least 64oz of water daily  Increase physical activity by 10 minutes daily  Gradually increase physical activity to a goal of 5 days per week for 30 minutes of MODERATE intensity PLUS 2 days per week of FULL BODY resistance training  Think bar   Premiere protein shake       Follow up in approximately 2 months with Non-Surgical Physician/Advanced Practitioner  Diagnoses and all orders for this visit:    Morbid obesity ([place] [address] )    IFG (impaired fasting glucose)  -     HEMOGLOBIN A1C W/ EAG ESTIMATION; Future    Bipolar II disorder ([place] [address] )    Acquired hypothyroidism    Gastroesophageal reflux disease without esophagitis    Prediabetes  -     HEMOGLOBIN A1C W/ EAG ESTIMATION;  Future          Subjective:   Chief Complaint   Patient presents with    Follow-up        Patient ID: Babak Lorenzo  is a 39 y o  female with excess weight/obesity here to pursue weight managment  Patient is pursuing Conservative Program      HPI Patient presents for MWM follow up  Tolerating Ozempic 0 5mg daily  Has noticed some constipation but continues to take Linzess and Miralax  Continues to see therapist weekly  Has been taking her dog for walks but no formal exercise    B: frosted miniwheats + 2% milk OR 2 slices of MG toast with PB  S: skips  L: granola bar or [employer] [place] protein bar or left over (reports smaller portions)  S: skips  D: varies, portions much smaller    Colonoscopy: N/A    The following portions of the patient's history were reviewed and updated as appropriate: allergies, current medications, past family history, past medical history, past social history, past surgical history and problem list     Review of Systems   Constitutional: Positive for unexpected weight change (expected weight loss)  Gastrointestinal: Positive for nausea  Negative for vomiting  Objective:    /80   Pulse 97   Temp 97 9 °F (36 6 °C)   Resp 20   Ht 5' 7" (1 702 m)   Wt (!) 180 kg (397 lb 6 4 oz)   SpO2 98%   BMI 62 24 kg/m²      Physical Exam  Vitals and nursing note reviewed  Constitutional:       General: She is not in acute distress  Appearance: She is obese  She is not toxic-appearing  HENT:      Head: Normocephalic and atraumatic  Eyes:      General: No scleral icterus  Pulmonary:      Effort: Pulmonary effort is normal  No respiratory distress  Abdominal:      Comments: Obese, protuberant   Musculoskeletal:         General: Normal range of motion  Skin:     General: Skin is dry  Neurological:      Mental Status: She is alert and oriented to person, place, and time  Psychiatric:         Mood and Affect: Mood normal          Thought Content:  Thought content normal          Judgment: Judgment normal

## 2022-02-10 ENCOUNTER — TELEPHONE (OUTPATIENT)
Dept: NEUROLOGY | Facility: CLINIC | Age: 37
End: 2022-02-10

## 2022-02-10 DIAGNOSIS — G43.709 CHRONIC MIGRAINE WITHOUT AURA WITHOUT STATUS MIGRAINOSUS, NOT INTRACTABLE: ICD-10-CM

## 2022-02-10 DIAGNOSIS — G43.719 INTRACTABLE CHRONIC MIGRAINE WITHOUT AURA AND WITHOUT STATUS MIGRAINOSUS: Primary | ICD-10-CM

## 2022-02-10 NOTE — TELEPHONE ENCOUNTER
Patient called regarding new migraine that started 2/6  Patient having her typical migraine  Feeling head and neck pain as usual  Feels "out of it"  States she took nurtec Sunday, Monday (skipped Tuesday), and yesterday  Also took [id number] mg lasix (per PCP) on Monday and Benadryl to sleep at night  Headache resolved Tuesday but came back yesterday  Yesterday she was sleeping a lot as well like she used to in the past with her migraines  Took a nap yesterday 12 pm - 3 pm and then 5pm - 7pm      Medications:   Last dose of Emgality 1/27  Tasking Nurtec 75 mg every other day   Lasix 20 mg once daily  Celebrex 200 mg 1 tab BID  Topamax 100 mg daily     [facility] [place]        Best call back [phone], okay with detailed messages

## 2022-02-11 ENCOUNTER — TELEPHONE (OUTPATIENT)
Dept: NEUROLOGY | Facility: CLINIC | Age: 37
End: 2022-02-11

## 2022-02-11 NOTE — TELEPHONE ENCOUNTER
She said she prefers to avoid steroids however she is receptive to trying olanzapine; it's in her med list from 2020 but she said she does not recall trying

## 2022-02-11 NOTE — TELEPHONE ENCOUNTER
Response received via TT    Magda Stahl   OK, just make sure she does not have prolonged QT syndrome (see when her last EKG was and what the QTC was, and if she doesnt have an EKG just make sure that its not In her history  As long as its not, she can do Alanza pain 10 mg at night for seven days  We should call it in as Alanza pain 10 mg tablets one tablet a bedtime for seven days  Dispense seven with one refill  If you could call it in and just throw the prescription in the computer that way I can cosign

## 2022-02-11 NOTE — TELEPHONE ENCOUNTER
Received MRO request, Request was scanned into chart and faxed to [facility]

## 2022-02-11 NOTE — TELEPHONE ENCOUNTER
Last EKG 8/12/2020 shows NSR/Normal ECG, no documentation of prolonged QT in patient's chart "snapshot"    Order phoned to pharmacy; patient aware  Thank you

## 2022-02-14 DIAGNOSIS — I10 HYPERTENSION, UNSPECIFIED TYPE: ICD-10-CM

## 2022-02-15 RX ORDER — SPIRONOLACTONE 25 MG/1
TABLET ORAL
Qty: 90 TABLET | Refills: 1 | Status: SHIPPED | OUTPATIENT
Start: 2022-02-15 | End: 2022-07-06

## 2022-02-15 RX ORDER — OLANZAPINE 10 MG/1
10 TABLET ORAL
Qty: 7 TABLET | Refills: 1 | OUTPATIENT
Start: 2022-02-15 | End: 2022-04-12

## 2022-02-21 DIAGNOSIS — G43.709 CHRONIC MIGRAINE WITHOUT AURA WITHOUT STATUS MIGRAINOSUS, NOT INTRACTABLE: ICD-10-CM

## 2022-02-22 RX ORDER — CELECOXIB 200 MG/1
CAPSULE ORAL
Qty: 60 CAPSULE | Refills: 4 | Status: SHIPPED | OUTPATIENT
Start: 2022-02-22 | End: 2022-07-05

## 2022-03-04 ENCOUNTER — TELEPHONE (OUTPATIENT)
Dept: NEUROLOGY | Facility: CLINIC | Age: 37
End: 2022-03-04

## 2022-03-04 NOTE — PLAN OF CARE
Problem: Ineffective Coping  Goal: Participates in unit activities  Interventions:  - Provide therapeutic environment   - Provide required programming   - Redirect inappropriate behaviors    Outcome: Progressing Statement Selected

## 2022-03-04 NOTE — TELEPHONE ENCOUNTER
Called the patient regarding a message that was received to reschedule her appt with Dr Fazal Lawson  I offered a sooner appt with Nazia Trivedi because Dr Fazal Lawson is scheduling out into August  Patient stated she would prefer to see Dr Fazal Lawson and decided she will keep her current appt

## 2022-03-08 ENCOUNTER — APPOINTMENT (OUTPATIENT)
Dept: LAB | Facility: CLINIC | Age: 37
End: 2022-03-08
Payer: COMMERCIAL

## 2022-03-08 DIAGNOSIS — R73.01 IFG (IMPAIRED FASTING GLUCOSE): ICD-10-CM

## 2022-03-08 DIAGNOSIS — R73.03 PREDIABETES: ICD-10-CM

## 2022-03-08 LAB
EST. AVERAGE GLUCOSE BLD GHB EST-MCNC: 114 MG/DL
HBA1C MFR BLD: 5.6 %

## 2022-03-08 PROCEDURE — 83036 HEMOGLOBIN GLYCOSYLATED A1C: CPT

## 2022-03-08 PROCEDURE — 36415 COLL VENOUS BLD VENIPUNCTURE: CPT

## 2022-03-09 NOTE — TELEPHONE ENCOUNTER
Patient called I regarding records request  Informed her that this was indeed faxed over to [facility]   I provided her their number to call and follow up, then refaxed the request

## 2022-03-19 ENCOUNTER — OFFICE VISIT (OUTPATIENT)
Dept: INTERNAL MEDICINE CLINIC | Facility: CLINIC | Age: 37
End: 2022-03-19
Payer: COMMERCIAL

## 2022-03-19 VITALS
SYSTOLIC BLOOD PRESSURE: 118 MMHG | HEART RATE: 79 BPM | WEIGHT: 293 LBS | TEMPERATURE: 97.1 F | BODY MASS INDEX: 45.99 KG/M2 | HEIGHT: 67 IN | DIASTOLIC BLOOD PRESSURE: 70 MMHG | OXYGEN SATURATION: 99 %

## 2022-03-19 DIAGNOSIS — E07.9 THYROID DISEASE NEUROPATHY (HCC): ICD-10-CM

## 2022-03-19 DIAGNOSIS — Z00.00 MEDICARE ANNUAL WELLNESS VISIT, SUBSEQUENT: ICD-10-CM

## 2022-03-19 DIAGNOSIS — G25.81 RESTLESS LEG SYNDROME: ICD-10-CM

## 2022-03-19 DIAGNOSIS — G63 THYROID DISEASE NEUROPATHY (HCC): ICD-10-CM

## 2022-03-19 DIAGNOSIS — E03.9 HYPOTHYROIDISM, UNSPECIFIED TYPE: ICD-10-CM

## 2022-03-19 DIAGNOSIS — K59.09 CHRONIC CONSTIPATION: Primary | ICD-10-CM

## 2022-03-19 PROBLEM — N92.6 IRREGULAR BLEEDING: Status: RESOLVED | Noted: 2018-08-02 | Resolved: 2022-03-19

## 2022-03-19 PROBLEM — H53.40 VISUAL FIELD DEFECT: Status: RESOLVED | Noted: 2019-02-28 | Resolved: 2022-03-19

## 2022-03-19 PROBLEM — E87.70 VOLUME OVERLOAD: Status: RESOLVED | Noted: 2021-10-16 | Resolved: 2022-03-19

## 2022-03-19 PROBLEM — N81.89 VAGINAL RELAXATION: Status: RESOLVED | Noted: 2018-02-26 | Resolved: 2022-03-19

## 2022-03-19 PROCEDURE — 1036F TOBACCO NON-USER: CPT | Performed by: PHYSICIAN ASSISTANT

## 2022-03-19 PROCEDURE — 3008F BODY MASS INDEX DOCD: CPT | Performed by: PHYSICIAN ASSISTANT

## 2022-03-19 PROCEDURE — 3078F DIAST BP <80 MM HG: CPT | Performed by: PHYSICIAN ASSISTANT

## 2022-03-19 PROCEDURE — G0439 PPPS, SUBSEQ VISIT: HCPCS | Performed by: PHYSICIAN ASSISTANT

## 2022-03-19 PROCEDURE — 99213 OFFICE O/P EST LOW 20 MIN: CPT | Performed by: PHYSICIAN ASSISTANT

## 2022-03-19 PROCEDURE — 3074F SYST BP LT 130 MM HG: CPT | Performed by: PHYSICIAN ASSISTANT

## 2022-03-19 RX ORDER — ROPINIROLE 4 MG/1
4 TABLET, FILM COATED ORAL 2 TIMES DAILY
Qty: 180 TABLET | Refills: 1 | Status: SHIPPED | OUTPATIENT
Start: 2022-03-19

## 2022-03-19 RX ORDER — LEVOTHYROXINE SODIUM 0.12 MG/1
125 TABLET ORAL EVERY MORNING
Qty: 90 TABLET | Refills: 0 | Status: SHIPPED | OUTPATIENT
Start: 2022-03-19 | End: 2022-06-12

## 2022-03-19 RX ORDER — PROPRANOLOL HYDROCHLORIDE 10 MG/1
10 TABLET ORAL 2 TIMES DAILY
COMMUNITY
Start: 2022-03-02

## 2022-03-19 NOTE — PROGRESS NOTES
Assessment and Plan:     Problem List Items Addressed This Visit        Endocrine    Hypothyroidism    Relevant Medications    propranolol (INDERAL) 10 mg tablet      Other Visit Diagnoses     Restless leg syndrome               Preventive health issues were discussed with patient, and age appropriate screening tests were ordered as noted in patient's After Visit Summary  Personalized health advice and appropriate referrals for health education or preventive services given if needed, as noted in patient's After Visit Summary       History of Present Illness:     Patient presents for Medicare Annual Wellness visit    Patient Care Team:  Michele Irizarry PA-C as PCP - General (Family Medicine)  Mara Severance, DO as PCP - [address] [place], [place] [id number] ([facility])  Maddy Luna DO as PCP - [facility]CP-[facility] ([facility])  YANG Shahid PA-C Remi Redbird, DO Natale Clark, MD Harvy Rua, MD     Problem List:     Patient Active Problem List   Diagnosis    Obsessive-compulsive disorder    Hypertension    Hypothyroidism    Overactive bladder    Allergic rhinitis    Binge-eating disorder, severe    Gambling disorder, moderate    Severe bipolar I disorder, most recent episode depressed ([address] )    Encntr for gyn exam (general) (routine) w abnormal findings    Vaginal relaxation    Routine screening for STI (sexually transmitted infection)    BMI 50 0-59 9, adult ([address] )    Dysfunction of both eustachian tubes    Amenorrhea    Bulging lumbar disc    Esophageal reflux    EILEEN (generalized anxiety disorder)    Lumbar degenerative disc disease    Morbid obesity ([address] )    Nocturnal enuresis    PTSD (post-traumatic stress disorder)    RLS (restless legs syndrome)    Urgency incontinence    Vitamin D deficiency    Chronic midline low back pain without sciatica    Irregular bleeding    Iliotibial band syndrome of left side    Piriformis syndrome of left side    Skin lesion    Multiple nevi    Bipolar II disorder ([facility]    Recurrent sinusitis    IFG (impaired fasting glucose)    Breast nodule    Family history of cancer    Idiopathic intracranial hypertension    Visual field defect    Chronic migraine without aura without status migrainosus, not intractable    Chronic tension-type headache, not intractable    Menorrhagia with irregular cycle    Encounter for insertion of mirena IUD    CAIO (obstructive sleep apnea)    Encntr for gyn exam (general) (routine) w/o abn findings    IUD check up    Class 3 severe obesity with body mass index (BMI) of 60 0 to 69 9 in adult (HCC)    Cervicalgia    Numbness and tingling in both hands    Dermatitis    Chronic constipation    Volume overload      Past Medical and Surgical History:     Past Medical History:   Diagnosis Date    Anorexia nervosa in remission     Anxiety     Back pain     B[facility] disorder [facility]     Depression     Esophageal reflux 8/15/2013    Hypertension     Hypothyroid     Idiopathic intracranial hypertension     Lumbar degenerative disc disease 5/8/2014    Migraine     Obesity     Obsessive-compulsive disorder     Overactive bladder     Psychiatric disorder     Restless leg syndrome, controlled     Seasonal allergies     Suicide and self-inflicted injury ([facility] )     Transcranial Magnetic Stimulation 09/06/2021     Past Surgical History:   Procedure Laterality Date    DENTAL SURGERY      IR LUMBAR PUNCTURE  2/26/2019    SINUS ENDOSCOPY      TONSILLECTOMY        Family History:     Family History   Problem Relation Age of Onset    Depression Mother     Suicide Attempts Mother     Hypertension Mother     Diabetes Mother     Other Mother         bicuspid aortic valve    Hypertension Father     Hypothyroidism Father     Mental illness Father     Thyroid disease Father     Hypertension Brother     Cancer Maternal Grandmother     Heart disease Maternal Grandmother     Stroke Maternal Grandmother     Breast cancer Maternal Grandmother     Skin cancer Maternal Grandmother     Pneumonia Maternal Grandfather     Cancer Maternal Grandfather     Cancer Paternal Grandmother     Pneumonia Paternal Grandfather     Arthritis Family     Breast cancer Family     Osteopenia Family     Osteoporosis Family     Hypertension Family     Transient ischemic attack Family       Social History:     Social History     Socioeconomic History    Marital status: Single     Spouse name: None    Number of children: None    Years of education: None    Highest education level: None   Occupational History    Occupation: DISABLED   Tobacco Use    Smoking status: Never Smoker    Smokeless tobacco: Never Used   Vaping Use    Vaping Use: Never used   Substance and Sexual Activity    Alcohol use: Not Currently    Drug use: No    Sexual activity: Not Currently     Partners: Female   Other Topics Concern    None   Social History Narrative    Always uses seat belts    Caffeine use     Disabled - permament disability since 2008 due to psychiatric illness    Has no children    Single     Tea     Social Determinants of Health     Financial Resource Strain: Not on file   Food Insecurity: Not on file   Transportation Needs: Not on file   Physical Activity: Not on file   Stress: Not on file   Social Connections: Not on file   Intimate Partner Violence: Not on file   Housing Stability: Not on file      Medications and Allergies:     Current Outpatient Medications   Medication Sig Dispense Refill    buPROPion (WELLBUTRIN XL) 300 mg 24 hr tablet Take 300 mg by mouth daily       celecoxib (CeleBREX) 200 mg capsule TAKE 1 CAPSULE BY MOUTH TWICE A DAY 60 capsule 4    Cholecalciferol (VITAMIN D3) 5000 units CAPS Take 5,000 Units by mouth daily      clomiPRAMINE (ANAFRANIL) 50 mg capsule Take by mouth TAKE 2 CAPSULES IN AM AND 3 CAPSULES IN PM       furosemide (LASIX) 40 mg tablet Take 2 tabs daily for 3 days then reduce back to 1 5 tabs daily   (Patient taking differently: Pt is taking 1 5 tablets in the morning ) 120 tablet 1    gabapentin (NEURONTIN) 600 MG tablet TAKE 1/2 TABLET DAILY IN AM AND 1 AND 1/2 TABLET AT BEDTIME      Galcanezumab-gnlm (Emgality) 120 MG/ML SOAJ Inject 120 mg under the skin every 30 (thirty) days 1 mL 5    hydrocortisone-acetic acid (VOSOL-HC) otic solution Administer 3 drops into both ears every 6 (six) hours 10 mL 0    levothyroxine 125 mcg tablet TAKE 1 TABLET BY MOUTH EVERY MORNING 90 tablet 0    Linzess 290 MCG CAPS TAKE 1 CAPSULE BY MOUTH EVERY DAY 30 capsule 5    loratadine (CLARITIN) 10 mg tablet Take 10 mg by mouth daily      LORazepam (ATIVAN) 0 5 mg tablet       MAGNESIUM PO Take 1 tablet by mouth 2 (two) times a day       metFORMIN (GLUCOPHAGE) 500 mg tablet Take 1 tablet (500 mg total) by mouth daily with breakfast 30 tablet 2    Multiple Vitamin (MULTIVITAMIN) capsule Take 1 capsule by mouth daily      omeprazole (PriLOSEC) 20 mg delayed release capsule TAKE 1 CAPSULE BY MOUTH TWICE A  capsule 1    potassium chloride (K-DUR,KLOR-CON) 10 mEq tablet Take 1 tablet (10 mEq total) by mouth daily 90 tablet 3    propranolol (INDERAL) 10 mg tablet TAKE 1 TABLET BY MOUTH AT 6PM      RESTASIS 0 05 % ophthalmic emulsion PLACE 1 DROP INTO BOTH EYES TWICE A DAY  3    rOPINIRole (REQUIP) 4 mg tablet TAKE 1 TABLET BY MOUTH TWICE A DAY (Patient taking differently: TAKE 1 TABLET BY MOUTH DAILY) 180 tablet 1    Semaglutide,0 25 or 0 5MG/DOS, (Ozempic, 0 25 or 0 5 MG/DOSE,) 2 MG/1 5ML SOPN Inject 0 5mg subcutaneously once per week 1 5 mL 1    sertraline (ZOLOFT) 100 mg tablet Take 200 mg by mouth daily  0    spironolactone (ALDACTONE) 25 mg tablet TAKE 1 TABLET BY MOUTH EVERY DAY 90 tablet 1    topiramate (TOPAMAX) 100 mg tablet Take 1 tablet (100 mg total) by mouth daily 90 tablet 3    Vraylar 6 MG capsule Take 6 mg by mouth daily daily      Vyvanse 50 MG capsule Take 50 mg by mouth daily  Botulinum Toxin Type A (Botox) 200 units SOLR Inject as directed every 3 (three) months (Patient not taking: Reported on 9/29/2021)      hydrocortisone (ANUSOL-HC) 25 mg suppository Insert 1 suppository (25 mg total) into the rectum 2 (two) times a day (Patient not taking: Reported on 3/19/2022 ) 12 suppository 0    LORazepam (ATIVAN) 1 mg tablet Take 1 mg by mouth 3 (three) times a day as needed for anxiety  (Patient not taking: Reported on 2/3/2022 )      OLANZapine (ZyPREXA) 10 mg tablet Take 1 tablet (10 mg total) by mouth daily at bedtime for 7 days 7 tablet 1    Rimegepant Sulfate (Nurtec) 75 MG TBDP Take 75 mg by mouth every other day    If there is a migraine on a non-Nurtec day, can take a dose and skip the following day (Patient not taking: Reported on 12/9/2021 ) 15 tablet 3    Rimegepant Sulfate (Nurtec) 75 MG TBDP Take 75 mg by mouth (Patient not taking: Reported on 12/9/2021 )       Current Facility-Administered Medications   Medication Dose Route Frequency Provider Last Rate Last Admin    Botulinum Toxin Type A SOLR 200 Units  200 Units Injection Q3 Months Dannial Dandy, PA-C   200 Units at 04/01/21 1453    Botulinum Toxin Type A SOLR 200 Units  200 Units Injection Q3 Months Chapincito Early MD   200 Units at 07/28/21 1211     Allergies   Allergen Reactions    Doxycycline      Pt refuses d/t remote H/O intracranial hypertension     Other      Seasonal allergies       Immunizations:     Immunization History   Administered Date(s) Administered    INFLUENZA 10/19/2015, 09/28/2018    Influenza Injectable, MDCK, Preservative Free, Quadrivalent 10/08/2019    Influenza Quadrivalent Preservative Free 3 years and older IM 10/12/2017    Influenza, injectable, quadrivalent, preservative free 0 5 mL 08/29/2018, 09/16/2020, 10/16/2021    Influenza, seasonal, injectable 10/07/2014, 10/19/2015    Tdap 02/13/2019    Tuberculin Skin Test-PPD Intradermal 08/20/2018      Health Maintenance: Topic Date Due    Hepatitis C Screening  Never done    HIV Screening  Never done    Cervical Cancer Screening  02/26/2021         Topic Date Due    COVID-19 Vaccine (1) Never done      Medicare Health Risk Assessment:     /70 (BP Location: Left arm, Patient Position: Sitting, Cuff Size: Standard)   Pulse 79   Temp (!) 97 1 °F (36 2 °C)   Ht 5' 7" (1 702 m)   Wt (!) 178 kg (393 lb 6 4 oz)   SpO2 99%   BMI 61 62 kg/m²          Health Risk Assessment:   Patient rates overall health as good  Patient feels that their physical health rating is slightly better  Patient is satisfied with their life  Eyesight was rated as slightly worse  Hearing was rated as same  Patient feels that their emotional and mental health rating is same  Patients states they are never, rarely angry  Patient states they are often unusually tired/fatigued  Pain experienced in the last 7 days has been some  Patient's pain rating has been 6/10  Patient states that she has experienced no weight loss or gain in last 6 months  Fall Risk Screening: In the past year, patient has experienced: history of falling in past year      Urinary Incontinence Screening:   Patient has leaked urine accidently in the last six months  Home Safety:  Patient does not have trouble with stairs inside or outside of their home  Patient has working smoke alarms and has no working carbon monoxide detector  Home safety hazards include: none  Nutrition:   Current diet is Regular and Other (please comment)  Medications:   Patient is currently taking over-the-counter supplements  OTC medications include: Vitamin D, Multivitamin  Patient is able to manage medications  Activities of Daily Living (ADLs)/Instrumental Activities of Daily Living (IADLs):   Walk and transfer into and out of bed and chair?: Yes  Dress and groom yourself?: Yes    Bathe or shower yourself?: Yes    Feed yourself?  Yes  Do your laundry/housekeeping?: Yes  Manage your money, pay your bills and track your expenses?: Yes  Make your own meals?: Yes    Do your own shopping?: Yes    Previous Hospitalizations:   Any hospitalizations or ED visits within the last 12 months?: No      Advance Care Planning:   Living will: No    Durable POA for healthcare: No    Advanced directive: No      Cognitive Screening:   Provider or family/friend/caregiver concerned regarding cognition?: No    PREVENTIVE SCREENINGS      Cardiovascular Screening:    General: Screening Current      Diabetes Screening:     General: Screening Current      Colorectal Cancer Screening:     General: Screening Not Indicated      Breast Cancer Screening:     General: Screening Not Indicated      Cervical Cancer Screening:    General: Screening Current      Osteoporosis Screening:    General: Screening Not Indicated      Abdominal Aortic Aneurysm (AAA) Screening:        General: Screening Not Indicated      Lung Cancer Screening:     General: Screening Not Indicated    Screening, Brief Intervention, and Referral to Treatment (SBIRT)    Screening  Typical number of drinks in a day: 0  Typical number of drinks in a week: 0  Interpretation: Low risk drinking behavior      AUDIT-C Screenin) How often did you have a drink containing alcohol in the past year? never  2) How many drinks did you have on a typical day when you were drinking in the past year? 0  3) How often did you have 6 or more drinks on one occasion in the past year? never    AUDIT-C Score: 0  Interpretation: Score 0-2 (female): Negative screen for alcohol misuse    Single Item Drug Screening:  How often have you used an illegal drug (including marijuana) or a prescription medication for non-medical reasons in the past year? never    Single Item Drug Screen Score: 0  Interpretation: Negative screen for possible drug use disorder      Gabriella Paredes PA-C

## 2022-03-19 NOTE — PROGRESS NOTES
Assessment/Plan:  Problem List Items Addressed This Visit        Digestive    Chronic constipation     Continue current medication regime and pursue gastric emptying study            Endocrine    Hypothyroidism    Relevant Medications    propranolol (INDERAL) 10 mg tablet    levothyroxine 125 mcg tablet    Thyroid disease neuropathy (HCC) - Primary    Relevant Medications    propranolol (INDERAL) 10 mg tablet    levothyroxine 125 mcg tablet      Other Visit Diagnoses     Restless leg syndrome        Relevant Medications    rOPINIRole (REQUIP) 4 mg tablet           Diagnoses and all orders for this visit:    Thyroid disease neuropathy (HCC)    Restless leg syndrome  -     rOPINIRole (REQUIP) 4 mg tablet; Take 1 tablet (4 mg total) by mouth 2 (two) times a day    Hypothyroidism, unspecified type  -     levothyroxine 125 mcg tablet; Take 1 tablet (125 mcg total) by mouth every morning    Chronic constipation    Other orders  -     propranolol (INDERAL) 10 mg tablet; TAKE 1 TABLET BY MOUTH AT 6PM        Chronic constipation  Continue current medication regime and pursue gastric emptying study      Subjective:      Patient ID: Navi Urias is a 39 y o  female  Pt presents for routine visit  She is doing well overall  She was recently started on ozempic and it has been helpful for her weight loss efforts  AWV also performed  Labs are up to date  She continues with chronic constipation and plans to pursue gastric emptying study ordered at last visit  She continues to follow with psychiatry for her depression and is doing [address] on a weekly basis for maintenance         The following portions of the patient's history were reviewed and updated as appropriate:   She has a past medical history of Anorexia nervosa in remission, Anxiety, Back pain, Bipolar disorder ([facility] ), Depression, Esophageal reflux (8/15/2013), Hypertension, Hypothyroid, Idiopathic intracranial hypertension, Lumbar degenerative disc disease (5/8/2014), Migraine, Obesity, Obsessive-compulsive disorder, Overactive bladder, Psychiatric disorder, Restless leg syndrome, controlled, Seasonal allergies, Suicide and self-inflicted injury ([facility] [address] ), and Transcranial Magnetic Stimulation (09/06/2021)  ,  does not have any pertinent problems on file  ,   has a past surgical history that includes Tonsillectomy; Sinus endoscopy; Dental surgery; and IR lumbar puncture (2/26/2019)  ,  family history includes Arthritis in her family; Breast cancer in her family and maternal grandmother; Cancer in her maternal grandfather, maternal grandmother, and paternal grandmother; Depression in her mother; Diabetes in her mother; Heart disease in her maternal grandmother; Hypertension in her brother, family, father, and mother; Hypothyroidism in her father; Mental illness in her father; Osteopenia in her family; Osteoporosis in her family; Other in her mother; Pneumonia in her maternal grandfather and paternal grandfather; Skin cancer in her maternal grandmother; Stroke in her maternal grandmother; Suicide Attempts in her mother; Thyroid disease in her father; Transient ischemic attack in her family  ,   reports that she has never smoked  She has never used smokeless tobacco  She reports previous alcohol use  She reports that she does not use drugs  ,  is allergic to doxycycline and other     Current Outpatient Medications   Medication Sig Dispense Refill    buPROPion (WELLBUTRIN XL) 300 mg 24 hr tablet Take 300 mg by mouth daily       celecoxib (CeleBREX) 200 mg capsule TAKE 1 CAPSULE BY MOUTH TWICE A DAY 60 capsule 4    Cholecalciferol (VITAMIN D3) 5000 units CAPS Take 5,000 Units by mouth daily      clomiPRAMINE (ANAFRANIL) 50 mg capsule Take by mouth TAKE 2 CAPSULES IN AM AND 3 CAPSULES IN PM       furosemide (LASIX) 40 mg tablet Take 2 tabs daily for 3 days then reduce back to 1 5 tabs daily   (Patient taking differently: Pt is taking 1 5 tablets in the morning ) 120 tablet 1    gabapentin (NEURONTIN) 600 MG tablet TAKE 1/2 TABLET DAILY IN AM AND 1 AND 1/2 TABLET AT BEDTIME      Galcanezumab-gnlm (Emgality) 120 MG/ML SOAJ Inject 120 mg under the skin every 30 (thirty) days 1 mL 5    hydrocortisone-acetic acid (VOSOL-HC) otic solution Administer 3 drops into both ears every 6 (six) hours 10 mL 0    levothyroxine 125 mcg tablet Take 1 tablet (125 mcg total) by mouth every morning 90 tablet 0    Linzess 290 MCG CAPS TAKE 1 CAPSULE BY MOUTH EVERY DAY 30 capsule 5    loratadine (CLARITIN) 10 mg tablet Take 10 mg by mouth daily      LORazepam (ATIVAN) 0 5 mg tablet       MAGNESIUM PO Take 1 tablet by mouth 2 (two) times a day       metFORMIN (GLUCOPHAGE) 500 mg tablet Take 1 tablet (500 mg total) by mouth daily with breakfast 30 tablet 2    Multiple Vitamin (MULTIVITAMIN) capsule Take 1 capsule by mouth daily      omeprazole (PriLOSEC) 20 mg delayed release capsule TAKE 1 CAPSULE BY MOUTH TWICE A  capsule 1    potassium chloride (K-DUR,KLOR-CON) 10 mEq tablet Take 1 tablet (10 mEq total) by mouth daily 90 tablet 3    propranolol (INDERAL) 10 mg tablet TAKE 1 TABLET BY MOUTH AT 6PM      RESTASIS 0 05 % ophthalmic emulsion PLACE 1 DROP INTO BOTH EYES TWICE A DAY  3    rOPINIRole (REQUIP) 4 mg tablet Take 1 tablet (4 mg total) by mouth 2 (two) times a day 180 tablet 1    Semaglutide,0 25 or 0 5MG/DOS, (Ozempic, 0 25 or 0 5 MG/DOSE,) 2 MG/1 5ML SOPN Inject 0 5mg subcutaneously once per week 1 5 mL 1    sertraline (ZOLOFT) 100 mg tablet Take 200 mg by mouth daily  0    spironolactone (ALDACTONE) 25 mg tablet TAKE 1 TABLET BY MOUTH EVERY DAY 90 tablet 1    topiramate (TOPAMAX) 100 mg tablet Take 1 tablet (100 mg total) by mouth daily 90 tablet 3    Vraylar 6 MG capsule Take 6 mg by mouth daily daily      Vyvanse 50 MG capsule Take 50 mg by mouth daily      Botulinum Toxin Type A (Botox) 200 units SOLR Inject as directed every 3 (three) months (Patient not taking: Reported on 9/29/2021)  hydrocortisone (ANUSOL-HC) 25 mg suppository Insert 1 suppository (25 mg total) into the rectum 2 (two) times a day (Patient not taking: Reported on 3/19/2022 ) 12 suppository 0    LORazepam (ATIVAN) 1 mg tablet Take 1 mg by mouth 3 (three) times a day as needed for anxiety  (Patient not taking: Reported on 2/3/2022 )      OLANZapine (ZyPREXA) 10 mg tablet Take 1 tablet (10 mg total) by mouth daily at bedtime for 7 days 7 tablet 1    Rimegepant Sulfate (Nurtec) 75 MG TBDP Take 75 mg by mouth every other day   If there is a migraine on a non-Nurtec day, can take a dose and skip the following day (Patient not taking: Reported on 12/9/2021 ) 15 tablet 3    Rimegepant Sulfate (Nurtec) 75 MG TBDP Take 75 mg by mouth (Patient not taking: Reported on 12/9/2021 )       Current Facility-Administered Medications   Medication Dose Route Frequency Provider Last Rate Last Admin    Botulinum Toxin Type A SOLR 200 Units  200 Units Injection Q3 Months Arlet Doyle PA-C   200 Units at 04/01/21 1453    Botulinum Toxin Type A SOLR 200 Units  200 Units Injection Q3 Months Kelly Gee MD   200 Units at 07/28/21 1211       Review of Systems   Constitutional: Negative for chills and fever  HENT: Negative for congestion, ear pain, hearing loss, postnasal drip, rhinorrhea, sinus pressure, sinus pain, sore throat and trouble swallowing  Eyes: Negative for pain and visual disturbance  Respiratory: Negative for cough, chest tightness, shortness of breath and wheezing  Cardiovascular: Negative  Negative for chest pain, palpitations and leg swelling  Gastrointestinal: Negative for abdominal pain, blood in stool, constipation, diarrhea, nausea and vomiting  Endocrine: Negative for cold intolerance, heat intolerance, polydipsia, polyphagia and polyuria  Genitourinary: Negative for difficulty urinating, dysuria, flank pain and urgency     Musculoskeletal: Negative for arthralgias, back pain, gait problem and myalgias  Skin: Negative for rash  Allergic/Immunologic: Negative  Neurological: Negative for dizziness, weakness, light-headedness and headaches  Hematological: Negative  Psychiatric/Behavioral: Negative for behavioral problems, dysphoric mood and sleep disturbance  The patient is not nervous/anxious  PHQ-2/9 Depression Screening            Objective:  Vitals:    03/19/22 1124   BP: 118/70   BP Location: Left arm   Patient Position: Sitting   Cuff Size: Standard   Pulse: 79   Temp: (!) 97 1 °F (36 2 °C)   SpO2: 99%   Weight: (!) 178 kg (393 lb 6 4 oz)   Height: 5' 7" (1 702 m)     Body mass index is 61 62 kg/m²  Physical Exam  Constitutional:       General: She is not in acute distress  Appearance: She is well-developed  She is not diaphoretic  HENT:      Head: Normocephalic and atraumatic  Right Ear: External ear normal       Left Ear: External ear normal       Nose: Nose normal       Mouth/Throat:      Pharynx: No oropharyngeal exudate  Eyes:      General: No scleral icterus  Right eye: No discharge  Left eye: No discharge  Conjunctiva/sclera: Conjunctivae normal       Pupils: Pupils are equal, round, and reactive to light  Neck:      Thyroid: No thyromegaly  Cardiovascular:      Rate and Rhythm: Normal rate and regular rhythm  Heart sounds: Normal heart sounds  No murmur heard  No friction rub  No gallop  Pulmonary:      Effort: Pulmonary effort is normal  No respiratory distress  Breath sounds: Normal breath sounds  No wheezing or rales  Abdominal:      General: Bowel sounds are normal  There is no distension  Palpations: Abdomen is soft  Tenderness: There is no abdominal tenderness  Musculoskeletal:         General: No tenderness or deformity  Normal range of motion  Cervical back: Normal range of motion and neck supple  Skin:     General: Skin is warm and dry     Neurological:      Mental Status: She is alert and oriented to person, place, and time  Cranial Nerves: No cranial nerve deficit  Psychiatric:         Behavior: Behavior normal          Thought Content:  Thought content normal          Judgment: Judgment normal

## 2022-03-19 NOTE — PATIENT INSTRUCTIONS
Medicare Preventive Visit Patient Instructions  Thank you for completing your Welcome to Medicare Visit or Medicare Annual Wellness Visit today  Your next wellness visit will be due in one year (3/20/2023)  The screening/preventive services that you may require over the next 5-10 years are detailed below  Some tests may not apply to you based off risk factors and/or age  Screening tests ordered at today's visit but not completed yet may show as past due  Also, please note that scanned in results may not display below  Preventive Screenings:  Service Recommendations Previous Testing/Comments   Colorectal Cancer Screening  * Colonoscopy    * Fecal Occult Blood Test (FOBT)/Fecal Immunochemical Test (FIT)  * Fecal DNA/Cologuard Test  * Flexible Sigmoidoscopy Age: 54-65 years old   Colonoscopy: every 10 years (may be performed more frequently if at higher risk)  OR  FOBT/FIT: every 1 year  OR  Cologuard: every 3 years  OR  Sigmoidoscopy: every 5 years  Screening may be recommended earlier than age 48 if at higher risk for colorectal cancer  Also, an individualized decision between you and your healthcare provider will decide whether screening between the ages of 74-80 would be appropriate  Colonoscopy: 10/15/2021  FOBT/FIT: Not on file  Cologuard: Not on file  Sigmoidoscopy: Not on file          Breast Cancer Screening Age: 36 years old  Frequency: every 1-2 years  Not required if history of left and right mastectomy Mammogram: 12/06/2018    Screening Not Indicated   Cervical Cancer Screening Between the ages of 21-29, pap smear recommended once every 3 years  Between the ages of 33-67, can perform pap smear with HPV co-testing every 5 years     Recommendations may differ for women with a history of total hysterectomy, cervical cancer, or abnormal pap smears in past  Pap Smear: 03/06/2020    Screening Current   Hepatitis C Screening Once for adults born between 1945 and 1965  More frequently in patients at high risk for Hepatitis C Hep C Antibody: Not on file        Diabetes Screening 1-2 times per year if you're at risk for diabetes or have pre-diabetes Fasting glucose: 88 mg/dL   A1C: 5 6 %    Screening Current   Cholesterol Screening Once every 5 years if you don't have a lipid disorder  May order more often based on risk factors  Lipid panel: 06/26/2021    Screening Current     Other Preventive Screenings Covered by Medicare:  1  Abdominal Aortic Aneurysm (AAA) Screening: covered once if your at risk  You're considered to be at risk if you have a family history of AAA  2  Lung Cancer Screening: covers low dose CT scan once per year if you meet all of the following conditions: (1) Age 50-69; (2) No signs or symptoms of lung cancer; (3) Current smoker or have quit smoking within the last 15 years; (4) You have a tobacco smoking history of at least 30 pack years (packs per day multiplied by number of years you smoked); (5) You get a written order from a healthcare provider  3  Glaucoma Screening: covered annually if you're considered high risk: (1) You have diabetes OR (2) Family history of glaucoma OR (3)  aged 48 and older OR (3)  American aged 72 and older  3  Osteoporosis Screening: covered every 2 years if you meet one of the following conditions: (1) You're estrogen deficient and at risk for osteoporosis based off medical history and other findings; (2) Have a vertebral abnormality; (3) On glucocorticoid therapy for more than 3 months; (4) Have primary hyperparathyroidism; (5) On osteoporosis medications and need to assess response to drug therapy  · Last bone density test (DXA Scan): Not on file  5  HIV Screening: covered annually if you're between the age of 12-76  Also covered annually if you are younger than 13 and older than 72 with risk factors for HIV infection  For pregnant patients, it is covered up to 3 times per pregnancy      Immunizations:  Immunization Recommendations Influenza Vaccine Annual influenza vaccination during flu season is recommended for all persons aged >= 6 months who do not have contraindications   Pneumococcal Vaccine (Prevnar and Pneumovax)  * Prevnar = PCV13  * Pneumovax = PPSV23   Adults 25-60 years old: 1-3 doses may be recommended based on certain risk factors  Adults 72 years old: Prevnar (PCV13) vaccine recommended followed by Pneumovax (PPSV23) vaccine  If already received PPSV23 since turning 65, then PCV13 recommended at least one year after PPSV23 dose  Hepatitis B Vaccine 3 dose series if at intermediate or high risk (ex: diabetes, end stage renal disease, liver disease)   Tetanus (Td) Vaccine - COST NOT COVERED BY MEDICARE PART B Following completion of primary series, a booster dose should be given every 10 years to maintain immunity against tetanus  Td may also be given as tetanus wound prophylaxis  Tdap Vaccine - COST NOT COVERED BY MEDICARE PART B Recommended at least once for all adults  For pregnant patients, recommended with each pregnancy  Shingles Vaccine (Shingrix) - COST NOT COVERED BY MEDICARE PART B  2 shot series recommended in those aged 48 and above     Health Maintenance Due:      Topic Date Due    Hepatitis C Screening  Never done    HIV Screening  Never done    Cervical Cancer Screening  02/26/2021     Immunizations Due:      Topic Date Due    COVID-19 Vaccine (1) Never done     Advance Directives   What are advance directives? Advance directives are legal documents that state your wishes and plans for medical care  These plans are made ahead of time in case you lose your ability to make decisions for yourself  Advance directives can apply to any medical decision, such as the treatments you want, and if you want to donate organs  What are the types of advance directives? There are many types of advance directives, and each state has rules about how to use them   You may choose a combination of any of the following:  · Living will: This is a written record of the treatment you want  You can also choose which treatments you do not want, which to limit, and which to stop at a certain time  This includes surgery, medicine, IV fluid, and tube feedings  · Durable power of  for healthcare [facility]): This is a written record that states who you want to make healthcare choices for you when you are unable to make them for yourself  This person, called a proxy, is usually a family member or a friend  You may choose more than 1 proxy  · Do not resuscitate (DNR) order:  A DNR order is used in case your heart stops beating or you stop breathing  It is a request not to have certain forms of treatment, such as CPR  A DNR order may be included in other types of advance directives  · Medical directive: This covers the care that you want if you are in a coma, near death, or unable to make decisions for yourself  You can list the treatments you want for each condition  Treatment may include pain medicine, surgery, blood transfusions, dialysis, IV or tube feedings, and a ventilator (breathing machine)  · Values history: This document has questions about your views, beliefs, and how you feel and think about life  This information can help others choose the care that you would choose  Why are advance directives important? An advance directive helps you control your care  Although spoken wishes may be used, it is better to have your wishes written down  Spoken wishes can be misunderstood, or not followed  Treatments may be given even if you do not want them  An advance directive may make it easier for your family to make difficult choices about your care  Urinary Incontinence   Urinary incontinence (UI)  is when you lose control of your bladder  UI develops because your bladder cannot store or empty urine properly  The 3 most common types of UI are stress incontinence, urge incontinence, or both    Medicines:   · May be given to help strengthen your bladder control  Report any side effects of medication to your healthcare provider  Do pelvic muscle exercises often:  Your pelvic muscles help you stop urinating  Squeeze these muscles tight for 5 seconds, then relax for 5 seconds  Gradually work up to squeezing for 10 seconds  Do 3 sets of 15 repetitions a day, or as directed  This will help strengthen your pelvic muscles and improve bladder control  Train your bladder:  Go to the bathroom at set times, such as every 2 hours, even if you do not feel the urge to go  You can also try to hold your urine when you feel the urge to go  For example, hold your urine for 5 minutes when you feel the urge to go  As that becomes easier, hold your urine for 10 minutes  Self-care:   · Keep a UI record  Write down how often you leak urine and how much you leak  Make a note of what you were doing when you leaked urine  · Drink liquids as directed  You may need to limit the amount of liquid you drink to help control your urine leakage  Do not drink any liquid right before you go to bed  Limit or do not have drinks that contain caffeine or alcohol  · Prevent constipation  Eat a variety of high-fiber foods  Good examples are high-fiber cereals, beans, vegetables, and whole-grain breads  Walking is the best way to trigger your intestines to have a bowel movement  · Exercise regularly and maintain a healthy weight  Weight loss and exercise will decrease pressure on your bladder and help you control your leakage  · Use a catheter as directed  to help empty your bladder  A catheter is a tiny, plastic tube that is put into your bladder to drain your urine  · Go to behavior therapy as directed  Behavior therapy may be used to help you learn to control your urge to urinate      Weight Management   Why it is important to manage your weight:  Being overweight increases your risk of health conditions such as heart disease, high blood pressure, type 2 diabetes, and certain types of cancer  It can also increase your risk for osteoarthritis, sleep apnea, and other respiratory problems  Aim for a slow, steady weight loss  Even a small amount of weight loss can lower your risk of health problems  How to lose weight safely:  A safe and healthy way to lose weight is to eat fewer calories and get regular exercise  You can lose up about 1 pound a week by decreasing the number of calories you eat by 500 calories each day  Healthy meal plan for weight management:  A healthy meal plan includes a variety of foods, contains fewer calories, and helps you stay healthy  A healthy meal plan includes the following:  · Eat whole-grain foods more often  A healthy meal plan should contain fiber  Fiber is the part of grains, fruits, and vegetables that is not broken down by your body  Whole-grain foods are healthy and provide extra fiber in your diet  Some examples of whole-grain foods are whole-wheat breads and pastas, oatmeal, brown rice, and bulgur  · Eat a variety of vegetables every day  Include dark, leafy greens such as spinach, kale, jaquan greens, and mustard greens  Eat yellow and orange vegetables such as carrots, sweet potatoes, and winter squash  · Eat a variety of fruits every day  Choose fresh or canned fruit (canned in its own juice or light syrup) instead of juice  Fruit juice has very little or no fiber  · Eat low-fat dairy foods  Drink fat-free (skim) milk or 1% milk  Eat fat-free yogurt and low-fat cottage cheese  Try low-fat cheeses such as mozzarella and other reduced-fat cheeses  · Choose meat and other protein foods that are low in fat  Choose beans or other legumes such as split peas or lentils  Choose fish, skinless poultry (chicken or turkey), or lean cuts of red meat (beef or pork)  Before you cook meat or poultry, cut off any visible fat  · Use less fat and oil  Try baking foods instead of frying them   Add less fat, such as margarine, sour cream, regular salad dressing and mayonnaise to foods  Eat fewer high-fat foods  Some examples of high-fat foods include french fries, doughnuts, ice cream, and cakes  · Eat fewer sweets  Limit foods and drinks that are high in sugar  This includes candy, cookies, regular soda, and sweetened drinks  Exercise:  Exercise at least 30 minutes per day on most days of the week  Some examples of exercise include walking, biking, dancing, and swimming  You can also fit in more physical activity by taking the stairs instead of the elevator or parking farther away from stores  Ask your healthcare provider about the best exercise plan for you  © Copyright [employer] 2018 Information is for End User's use only and may not be sold, redistributed or otherwise used for commercial purposes   All illustrations and images included in CareNotes® are the copyrighted property of A D A M , [employer]  or [address] [place] [address]

## 2022-03-22 ENCOUNTER — NURSE TRIAGE (OUTPATIENT)
Dept: OTHER | Facility: OTHER | Age: 37
End: 2022-03-22

## 2022-03-22 DIAGNOSIS — K62.5 RECTAL BLEEDING: ICD-10-CM

## 2022-03-22 RX ORDER — HYDROCORTISONE ACETATE 25 MG/1
25 SUPPOSITORY RECTAL
Qty: 12 SUPPOSITORY | Refills: 0 | Status: SHIPPED | OUTPATIENT
Start: 2022-03-22 | End: 2022-03-22 | Stop reason: SDUPTHER

## 2022-03-22 RX ORDER — HYDROCORTISONE 25 MG/G
CREAM TOPICAL 2 TIMES DAILY
Qty: 28 G | Refills: 2 | Status: SHIPPED | OUTPATIENT
Start: 2022-03-22 | End: 2022-03-23 | Stop reason: SDUPTHER

## 2022-03-22 RX ORDER — HYDROCORTISONE ACETATE 25 MG/1
25 SUPPOSITORY RECTAL
Qty: 12 SUPPOSITORY | Refills: 0 | Status: SHIPPED | OUTPATIENT
Start: 2022-03-22

## 2022-03-22 NOTE — TELEPHONE ENCOUNTER
Per Rafy Wagoner- This is most likely secondary to anorectal source given her hx of constipation and small amount of blood  She had normal colonoscopy in October  I would recommend starting miralax daily or bid if needed and I have sent anusol cream and suppositories for her to try  She should follow up in the office as she may need rx for her constipation  If her bleeding worsens, she gets lightheaded/dizzy or sob or fevere she should go to the ED

## 2022-03-22 NOTE — TELEPHONE ENCOUNTER
Regarding: blood while passing stool  ----- Message from [facility] sent at 3/22/2022  3:34 PM EDT -----  "I am calling because I usually go to gastro in [place] and I was taking linzess, in the summer I was having some bleeding while going to the bathroom and they gave me suppository and a steroid and as of Sunday, I am having the bleeding again   I had already had a colonoscopy and they said everything looked okay"

## 2022-03-22 NOTE — TELEPHONE ENCOUNTER
Reason for Disposition   MILD rectal bleeding (more than just a few drops or streaks)    Answer Assessment - Initial Assessment Questions  1  APPEARANCE of BLOOD: "What color is it?" "Is it passed separately, on the surface of the stool, or mixed in with the stool?"       Bright red blood    2  AMOUNT: "How much blood was passed?"      About the size of a teaspoon or less    3  FREQUENCY: "How many times has blood been passed with the stools?"       Once per day    4  ONSET: "When was the blood first seen in the stools?" (Days or weeks)       This past Sunday    5  DIARRHEA: "Is there also some diarrhea?" If Yes, ask: "How many diarrhea stools were passed in past 24 hours?"       Denies    6  CONSTIPATION: "Do you have constipation?" If Yes, ask: "How bad is it?"      Yes    7  RECURRENT SYMPTOMS: "Have you had blood in your stools before?" If Yes, ask: "When was the last time?" and "What happened that time?"       Yes, last summer    8  BLOOD THINNERS: "Do you take any blood thinners?" (e g , Coumadin/warfarin, Pradaxa/dabigatran, aspirin)      Denies    9  OTHER SYMPTOMS: "Do you have any other symptoms?"  (e g , abdominal pain, vomiting, dizziness, fever)     Denies    10   PREGNANCY: "Is there any chance you are pregnant?" "When was your last menstrual period?"       2 weeks ago    Protocols used: RECTAL BLEEDING-ADULT-OH

## 2022-03-22 NOTE — TELEPHONE ENCOUNTER
Called pt and relayed information  She explained that in the past shes used the suppositories & they have helped  She requested the an[facility] be [facility] to [facility] as it is usually cheaper       Medications resent

## 2022-03-23 ENCOUNTER — TELEPHONE (OUTPATIENT)
Dept: OTHER | Facility: OTHER | Age: 37
End: 2022-03-23

## 2022-03-23 DIAGNOSIS — K62.5 RECTAL BLEEDING: ICD-10-CM

## 2022-03-23 RX ORDER — HYDROCORTISONE 25 MG/G
CREAM TOPICAL 2 TIMES DAILY
Qty: 28 G | Refills: 2 | Status: SHIPPED | OUTPATIENT
Start: 2022-03-23

## 2022-03-23 NOTE — TELEPHONE ENCOUNTER
Received refill request for suppositories, recommend the patient utilize [facility] as opposed to [facility] as patient can receive 12 suppositories from a pharmacy for $36-56 depending which pharmacy she prefers

## 2022-03-23 NOTE — TELEPHONE ENCOUNTER
Vanda Rainey (Self) [phone] (M)     Is requesting a call back regarding:     3/22/2022  [facility] [place], [place]   Dx Rectal bleeding      She expressed that the cost was too high and would like to possible have a suppository that she previously used called in?

## 2022-03-23 NOTE — TELEPHONE ENCOUNTER
spoke with pt and explained that we ordered the same suppository this time as was done in august  Pt explained that even at [address] [employer] , for 12 suppositories=$98      I informed pt about 24 anusol suppositories on amaz[employer] for $13  Pt confirmed she will get off of [address]  Pt requested cream be resent to [facility] as it was cheaper than at [address]  Script sent

## 2022-04-02 DIAGNOSIS — R73.01 IFG (IMPAIRED FASTING GLUCOSE): ICD-10-CM

## 2022-04-02 DIAGNOSIS — R63.5 ABNORMAL WEIGHT GAIN: ICD-10-CM

## 2022-04-02 DIAGNOSIS — E66.01 MORBID (SEVERE) OBESITY DUE TO EXCESS CALORIES (HCC): ICD-10-CM

## 2022-04-02 DIAGNOSIS — F50.81 BINGE-EATING DISORDER, SEVERE: ICD-10-CM

## 2022-04-05 ENCOUNTER — TELEPHONE (OUTPATIENT)
Dept: INTERNAL MEDICINE CLINIC | Facility: CLINIC | Age: 37
End: 2022-04-05

## 2022-04-05 DIAGNOSIS — G43.709 CHRONIC MIGRAINE WITHOUT AURA WITHOUT STATUS MIGRAINOSUS, NOT INTRACTABLE: Primary | ICD-10-CM

## 2022-04-05 NOTE — TELEPHONE ENCOUNTER
Sepideh Kim called from [address]  Neurology  She needs a physicians referral/order placed in Epic for patient  Dx code: G36 [id number]  Thank you

## 2022-04-11 DIAGNOSIS — G43.709 CHRONIC MIGRAINE WITHOUT AURA WITHOUT STATUS MIGRAINOSUS, NOT INTRACTABLE: ICD-10-CM

## 2022-04-11 RX ORDER — RIMEGEPANT SULFATE 75 MG/75MG
75 TABLET, ORALLY DISINTEGRATING ORAL EVERY OTHER DAY
Qty: 16 TABLET | Refills: 7 | Status: SHIPPED | OUTPATIENT
Start: 2022-04-11

## 2022-04-12 ENCOUNTER — OFFICE VISIT (OUTPATIENT)
Dept: NEUROLOGY | Facility: CLINIC | Age: 37
End: 2022-04-12
Payer: COMMERCIAL

## 2022-04-12 VITALS
HEART RATE: 73 BPM | DIASTOLIC BLOOD PRESSURE: 55 MMHG | SYSTOLIC BLOOD PRESSURE: 108 MMHG | BODY MASS INDEX: 45.99 KG/M2 | HEIGHT: 67 IN | WEIGHT: 293 LBS | TEMPERATURE: 97.7 F

## 2022-04-12 DIAGNOSIS — I10 PRIMARY HYPERTENSION: Chronic | ICD-10-CM

## 2022-04-12 DIAGNOSIS — G93.2 IDIOPATHIC INTRACRANIAL HYPERTENSION: ICD-10-CM

## 2022-04-12 DIAGNOSIS — G47.33 OSA (OBSTRUCTIVE SLEEP APNEA): ICD-10-CM

## 2022-04-12 DIAGNOSIS — G43.709 CHRONIC MIGRAINE WITHOUT AURA WITHOUT STATUS MIGRAINOSUS, NOT INTRACTABLE: Primary | ICD-10-CM

## 2022-04-12 PROCEDURE — 3078F DIAST BP <80 MM HG: CPT | Performed by: PSYCHIATRY & NEUROLOGY

## 2022-04-12 PROCEDURE — 3008F BODY MASS INDEX DOCD: CPT | Performed by: PSYCHIATRY & NEUROLOGY

## 2022-04-12 PROCEDURE — 99214 OFFICE O/P EST MOD 30 MIN: CPT | Performed by: PSYCHIATRY & NEUROLOGY

## 2022-04-12 PROCEDURE — 1036F TOBACCO NON-USER: CPT | Performed by: PSYCHIATRY & NEUROLOGY

## 2022-04-12 PROCEDURE — 3074F SYST BP LT 130 MM HG: CPT | Performed by: PSYCHIATRY & NEUROLOGY

## 2022-04-12 RX ORDER — TENS UNITS AND TENS ELECTRODES
COMBINATION PACKAGE (EA) MISCELLANEOUS
Qty: 1 EACH | Refills: 0 | Status: SHIPPED | OUTPATIENT
Start: 2022-04-12

## 2022-04-12 NOTE — PATIENT INSTRUCTIONS
Chronic migraine complicated by idiopathic intracranial hypertension:  Elizabeth presents for follow-up evaluation with regard to her chronic migraines  She reports that the bigger migraines are somewhat less frequent and less severe than they had been  Unfortunately she continues to experience daily headache     - for prevention at this point in time we will plan to continue her current combination of Emgality, Topamax, Nurtec, Celebrex  -to abort a migraine if she has not taken Nurtec on a given day she could take a dose that day and skip the following     - she was started recently on propranolol by her psychiatrist and I agree that this is a very reasonable option for migraine prevention as well as for treatment of anxiety  This can be titrated up at the discretion of her psychiatrist   If they find that she is having an inadequate at a maximum tolerated dose we would consider increasing Topamax/switching to the longer-acting version of Topamax, or adding cyproheptadine  - in addition, will add the cephaly nerve stimulator as both a preventive option for use every day and an abortive option   -in as much she is having daily headache I do think that further evaluation and management of her obstructive sleep apnea would be very important  I will provide her a referral to go back to see them at the sleep center  She should continue to keep track of any significant breakthrough migraines and the affect of our current treatment plan on her daily headaches  She will return to the office in 3 months time to see either myself or 1 of the advanced practice providers

## 2022-04-12 NOTE — PROGRESS NOTES
Patient ID: Mary Troncoso is a 39 y o  female  Assessment/Plan:   Patient Instructions   Chronic migraine complicated by idiopathic intracranial hypertension:  Elizabeth presents for follow-up evaluation with regard to her chronic migraines  She reports that the bigger migraines are somewhat less frequent and less severe than they had been  Unfortunately she continues to experience daily headache     - for prevention at this point in time we will plan to continue her current combination of Emgality, Topamax, Nurtec, Celebrex  -to abort a migraine if she has not taken Nurtec on a given day she could take a dose that day and skip the following     - she was started recently on propranolol by her psychiatrist and I agree that this is a very reasonable option for migraine prevention as well as for treatment of anxiety  This can be titrated up at the discretion of her psychiatrist   If they find that she is having an inadequate at a maximum tolerated dose we would consider increasing Topamax/switching to the longer-acting version of Topamax, or adding cyproheptadine  - in addition, will add the cephaly nerve stimulator as both a preventive option for use every day and an abortive option   -in as much she is having daily headache I do think that further evaluation and management of her obstructive sleep apnea would be very important  I will provide her a referral to go back to see them at the sleep center  She should continue to keep track of any significant breakthrough migraines and the affect of our current treatment plan on her daily headaches  She will return to the office in 3 months time to see either myself or 1 of the advanced practice providers  Diagnoses and all orders for this visit:    Chronic migraine without aura without status migrainosus, not intractable  -     Ambulatory Referral to Neurology  -     Nerve Stimulator (Jimi Traylor) JEFFREY;  Disp 1 device with electrodes, use as directed for 20 minutes daily for migraine prevention and as needed to abort migraine    CAIO (obstructive sleep apnea)  -     Ambulatory Referral to Sleep Medicine; Future    Primary hypertension    Idiopathic intracranial hypertension  -     Ambulatory Referral to Sleep Medicine; Future           Subjective:    HPI   What is your current headache frequency: 1 times per day    Have there been any changes in your headaches since your last visit? Better since last visit    Are you taking your current medications as prescribed? yes    Do you have any side effects? no    How often do you use abortive medications to treat a headache? patient states she does not take anything additional other than her headache regimen   How effective are they? effective    Updated HPI:    Headaches are lasting 1-2 days at the most at this point rather than a week as before  They are less severe but still has a pretty major impact on activity but she is able to do more  Also getting daily headaches in the afternoon  Original migraine: This is pretty much all over in terms of location with foggy thinking+ p/p sensitivity and also osmophobia   + Nausea  This is throbbing in character  Prior was 5-7 days  Prior was every other week, not difficulty to consider how often but approx every 10 days and less severe and shorter  She just feels lousy with them  Daily Headache: Has been going on for years  Typically always middle of the day, really notices more when she is being active  Starts in the back of the head, then the eyes hurt and then bilateral temporal   Pressure like in nature  Max 8/10  Lasts for up to an hour or two typically but can last all night or even into the next morning    Rarely photophobia/phonophobia/nausea   PREVENTATIVE:  - magnesium, B2, vitamin-D  - Claritin  - lisinopril  - Wellbutrin, Zoloft, haloperidol, lithium, Latuda, Cogentin, Risperdal  - Topamax, gabapentin, Lamictal, diamox, cyclobenzabrine, tizanidine  - Indomethacin  - botox    Current: Emgality, Gabapentin, Topamax, Nurtec (preventive), Celebrex    ABORTIVE:  - Toradol, ibuprofen, diclofenac  - hydrocodone  - Ativan    - Nurtec    CAIO: She has had the machine but had difficulty with anxiety associated with it              Past Medical History:   Diagnosis Date    Anorexia nervosa in remission     Anxiety     Back pain     Bipolar disorder ([facility] )     Depression     Esophageal reflux 8/15/2013    Hypertension     Hypothyroid     Idiopathic intracranial hypertension     Lumbar degenerative disc disease 5/8/2014    Migraine     Obesity     Obsessive-compulsive disorder     Overactive bladder     Psychiatric disorder     Restless leg syndrome, controlled     Seasonal allergies     Suicide and self-inflicted injury ([facility] )     Transcranial Magnetic Stimulation 09/06/2021       Social History     Socioeconomic History    Marital status: Single     Spouse name: None    Number of children: None    Years of education: None    Highest education level: None   Occupational History    Occupation: DISABLED   Tobacco Use    Smoking status: Never Smoker    Smokeless tobacco: Never Used   Vaping Use    Vaping Use: Never used   Substance and Sexual Activity    Alcohol use: Not Currently    Drug use: No    Sexual activity: Not Currently     Partners: Female   Other Topics Concern    None   Social History Narrative    Always uses seat belts    Caffeine use     Disabled - permament disability since 2008 due to psychiatric illness    Has no children    Single     Tea     Social Determinants of Health     Financial Resource Strain: Not on file   Food Insecurity: Not on file   Transportation Needs: Not on file   Physical Activity: Not on file   Stress: Not on file   Social Connections: Not on file   Intimate Partner Violence: Not on file   Housing Stability: Not on file         Current Outpatient Medications:     buPROPion (WELLBUTRIN XL) 300 mg 24 hr tablet, Take 300 mg by mouth daily , Disp: , Rfl:     celecoxib (CeleBREX) 200 mg capsule, TAKE 1 CAPSULE BY MOUTH TWICE A DAY, Disp: 60 capsule, Rfl: 4    Cholecalciferol (VITAMIN D3) 5000 units CAPS, Take 5,000 Units by mouth daily, Disp: , Rfl:     clomiPRAMINE (ANAFRANIL) 50 mg capsule, Take by mouth TAKE 2 CAPSULES IN AM AND 3 CAPSULES IN PM , Disp: , Rfl:     furosemide (LASIX) 40 mg tablet, Take 2 tabs daily for 3 days then reduce back to 1 5 tabs daily  (Patient taking differently: Take 20 mg by mouth daily Half a tablet daily or 1 5 tablets if a weight gain of 3 pounds in one day  ), Disp: 120 tablet, Rfl: 1    gabapentin (NEURONTIN) 600 MG tablet, TAKE 1/2 TABLET DAILY IN AM AND 1 AND 1/2 TABLET AT BEDTIME, Disp: , Rfl:     Galcanezumab-gnlm (Emgality) 120 MG/ML SOAJ, Inject 120 mg under the skin every 30 (thirty) days, Disp: 1 mL, Rfl: 5    hydrocortisone (ANUSOL-HC) 2 5 % rectal cream, Apply topically 2 (two) times a day, Disp: 28 g, Rfl: 2    hydrocortisone (ANUSOL-HC) 25 mg suppository, Insert 1 suppository (25 mg total) into the rectum daily at bedtime, Disp: 12 suppository, Rfl: 0    levothyroxine 125 mcg tablet, Take 1 tablet (125 mcg total) by mouth every morning, Disp: 90 tablet, Rfl: 0    Linzess 290 MCG CAPS, TAKE 1 CAPSULE BY MOUTH EVERY DAY, Disp: 30 capsule, Rfl: 5    loratadine (CLARITIN) 10 mg tablet, Take 10 mg by mouth daily, Disp: , Rfl:     LORazepam (ATIVAN) 0 5 mg tablet, , Disp: , Rfl:     metFORMIN (GLUCOPHAGE) 500 mg tablet, TAKE 1 TABLET BY MOUTH EVERY DAY WITH BREAKFAST, Disp: 90 tablet, Rfl: 0    Multiple Vitamin (MULTIVITAMIN) capsule, Take 1 capsule by mouth daily, Disp: , Rfl:     Nurtec 75 MG TBDP, TAKE 75 MG BY MOUTH EVERY OTHER DAY    IF THERE IS A MIGRAINE ON A NON-NURTEC DAY, CAN TAKE A DOSE AND SKIP THE FOLLOWING DAY, Disp: 16 tablet, Rfl: 7    omeprazole (PriLOSEC) 20 mg delayed release capsule, TAKE 1 CAPSULE BY MOUTH TWICE A DAY, Disp: 180 capsule, Rfl: 1   potassium chloride (K-DUR,KLOR-CON) 10 mEq tablet, Take 1 tablet (10 mEq total) by mouth daily, Disp: 90 tablet, Rfl: 3    propranolol (INDERAL) 10 mg tablet, Take 10 mg by mouth 2 (two) times a day  , Disp: , Rfl:     RESTASIS 0 05 % ophthalmic emulsion, PLACE 1 DROP INTO BOTH EYES TWICE A DAY, Disp: , Rfl: 3    rOPINIRole (REQUIP) 4 mg tablet, Take 1 tablet (4 mg total) by mouth 2 (two) times a day (Patient taking differently: Take 4 mg by mouth daily at bedtime  ), Disp: 180 tablet, Rfl: 1    Semaglutide,0 25 or 0 5MG/DOS, (Ozempic, 0 25 or 0 5 MG/DOSE,) 2 MG/1 5ML SOPN, Inject 0 5mg subcutaneously once per week, Disp: 1 5 mL, Rfl: 1    sertraline (ZOLOFT) 100 mg tablet, Take 200 mg by mouth daily, Disp: , Rfl: 0    spironolactone (ALDACTONE) 25 mg tablet, TAKE 1 TABLET BY MOUTH EVERY DAY, Disp: 90 tablet, Rfl: 1    topiramate (TOPAMAX) 100 mg tablet, Take 1 tablet (100 mg total) by mouth daily, Disp: 90 tablet, Rfl: 3    Vraylar 6 MG capsule, Take 6 mg by mouth daily daily, Disp: , Rfl:     Vyvanse 50 MG capsule, Take 50 mg by mouth daily, Disp: , Rfl:     hydrocortisone-acetic acid (VOSOL-HC) otic solution, Administer 3 drops into both ears every 6 (six) hours, Disp: 10 mL, Rfl: 0    LORazepam (ATIVAN) 1 mg tablet, Take 1 mg by mouth 3 (three) times a day as needed for anxiety  (Patient not taking: Reported on 2/3/2022 ), Disp: , Rfl:     MAGNESIUM PO, Take 1 tablet by mouth 2 (two) times a day , Disp: , Rfl:     Nerve Stimulator (Cefaly Kit) JEFFREY, Disp 1 device with electrodes, use as directed for 20 minutes daily for migraine prevention and as needed to abort migraine, Disp: 1 each, Rfl: 0    Current Facility-Administered Medications:     Botulinum Toxin Type A SOLR 200 Units, 200 Units, Injection, Q3 Months, Arlet Doyle PA-C, 200 Units at 04/01/21 1453    Botulinum Toxin Type A SOLR 200 Units, 200 Units, Injection, Q3 Months, Sil Ferreira MD, 200 Units at 07/28/21 1211    Allergies   Allergen Reactions    Doxycycline      Pt refuses d/t remote H/O intracranial hypertension     Other      Seasonal allergies          Objective:    /55 (BP Location: Left arm, Patient Position: Sitting, Cuff Size: Large)   Pulse 73   Temp 97 7 °F (36 5 °C) (Tympanic)   Ht 5' 7" (1 702 m)   Wt (!) 180 kg (396 lb 14 4 oz)   BMI 62 16 kg/m²       Physical Exam    Neurological Exam      He at the time of my evaluation she was awake, alert, and in no distress  There were no clear cranial neuropathies or lateralizing  Signs  There is no dysarthria or aphasia  She was able to rise easily without assistance and her gait was stable  She has morbid obesity  ROS:    Review of Systems   Constitutional: Negative  Negative for appetite change and fever  HENT: Negative  Negative for hearing loss, tinnitus, trouble swallowing and voice change  Eyes: Positive for photophobia and pain  Respiratory: Negative  Negative for shortness of breath  Cardiovascular: Negative  Negative for palpitations  Gastrointestinal: Negative  Negative for nausea and vomiting  Endocrine: Negative  Negative for cold intolerance  Genitourinary: Negative  Negative for dysuria, frequency and urgency  Musculoskeletal: Positive for neck pain  Negative for myalgias  Skin: Negative  Negative for rash  Neurological: Positive for headaches  Negative for dizziness, tremors, seizures, syncope, facial asymmetry, speech difficulty, weakness, light-headedness and numbness  Hematological: Negative  Does not bruise/bleed easily  Psychiatric/Behavioral: Negative  Negative for confusion, hallucinations and sleep disturbance  Reviewed ROS as entered by medical assistant      I have spent 32 minutes with Patient  today including counseling/coordination of care regarding Risks and benefits of tx options, Intructions for management and Patient and family education as well as on chart review, communication, and documentation

## 2022-04-13 ENCOUNTER — TELEPHONE (OUTPATIENT)
Dept: NEUROLOGY | Facility: CLINIC | Age: 37
End: 2022-04-13

## 2022-04-13 NOTE — TELEPHONE ENCOUNTER
----- Message from Joe Pagan MD sent at 4/12/2022  5:09 PM EDT -----  Hi,    I ordered a Cefaly device for Peggy Cuellar today for both preventive and abortive use  Do you know if we can check to see about insurance coverage for this or if this is always a direct patient pay?     Thanks!    -Dr Shin Primes

## 2022-04-14 NOTE — TELEPHONE ENCOUNTER
Per the Cefaly website:    "You can purchase your CEFALY DUAL migraine treatment kit and replacement electrodes directly from [facility]  Although Gracie Jorge is not covered by insurance, consumers can use Health Savings Account (HSA), Flexible Spending Account (FSA) funds or finance their purchase with [employer] for as little as $36 per month upon qualifying  In addition, [facility]  is honored to partner with [employer] across the country to offer [employer] access to drug-free, non-invasive migraine treatment fully covered under their VA benefits  Learn more about CEFALY for [employer]/veterans-migraine-relief  If you have any questions about CEFALY DUAL, please contact us!  Our customer service representatives are ready to answer questions about how Elfredia Clapper works and how to get the best results from your treatment "

## 2022-04-14 NOTE — TELEPHONE ENCOUNTER
MSW phoned [employer] at [phone] and spoke with Geno Lora confirmed that the device is not covered by insurance, but affordable payments plans are available  Geno stated that payment plans are available via [employer] (link available on www cefaly  com website) for as low as $30-40 per month if approved or via Pay[employer] for $50-60 per month if approved  Geno stated that they are having a spring sale special so they are offering 20% off  Cefaly device - originally $379, now $303  20  Cefaly device with bundle - now $403 20  Pack of electrodes - $25 for pack of 3 plus $7 25 for shipping  Geno stated that if patient wishes to pay by credit card, that she can call the [phone]  MSW phoned patient to make her aware of above at [phone]  Patient stated that it would be difficult for her to afford same as she is on the state insurance, but does have some money saved up  Patient stated that she will review the website to see if it is something that she would like to proceed with purchasing  MSW provided the website as [email or web address]  MSW offered to call patient back next week to see if she would like to proceed with Cefaly, but patient declined the need for same stating that she would contact this writer if she had any questions  Before patient hung up, she asked if there as any foundations/programs that she could apply for to help with the cost  MSW inquired if she was a [employer] as billing for the Levy device can go through the [place]  Patient stated that she was not  MSW called back to [employer] at [phone] and spoke with Aaron Frank who stated that unfortunately there are not any other programs to help with affordability  Patient was made aware  MSW will be available to patient as needed

## 2022-04-19 DIAGNOSIS — R73.01 IFG (IMPAIRED FASTING GLUCOSE): ICD-10-CM

## 2022-04-19 DIAGNOSIS — E66.01 MORBID OBESITY (HCC): ICD-10-CM

## 2022-04-19 RX ORDER — SEMAGLUTIDE 1.34 MG/ML
INJECTION, SOLUTION SUBCUTANEOUS
Qty: 1.5 ML | Refills: 1 | Status: SHIPPED | OUTPATIENT
Start: 2022-04-19 | End: 2022-05-17

## 2022-04-24 DIAGNOSIS — K21.9 GASTROESOPHAGEAL REFLUX DISEASE WITHOUT ESOPHAGITIS: ICD-10-CM

## 2022-04-25 RX ORDER — OMEPRAZOLE 20 MG/1
CAPSULE, DELAYED RELEASE ORAL
Qty: 180 CAPSULE | Refills: 1 | Status: SHIPPED | OUTPATIENT
Start: 2022-04-25

## 2022-04-28 NOTE — TELEPHONE ENCOUNTER
MSW retrieved a message left by patient on 4/26/22  She stated that she did purchase the Cefaly device through [employer] (monthly payment)  She reports that she has had it since last Friday and has been using it with relief  MSW will inform Dr Stephan Mccloud of above

## 2022-05-05 ENCOUNTER — OFFICE VISIT (OUTPATIENT)
Dept: BARIATRICS | Facility: CLINIC | Age: 37
End: 2022-05-05
Payer: COMMERCIAL

## 2022-05-05 VITALS
WEIGHT: 293 LBS | OXYGEN SATURATION: 98 % | DIASTOLIC BLOOD PRESSURE: 80 MMHG | HEART RATE: 79 BPM | SYSTOLIC BLOOD PRESSURE: 110 MMHG | BODY MASS INDEX: 47.09 KG/M2 | RESPIRATION RATE: 20 BRPM | HEIGHT: 66 IN | TEMPERATURE: 98 F

## 2022-05-05 DIAGNOSIS — E66.01 CLASS 3 SEVERE OBESITY WITH BODY MASS INDEX (BMI) OF 60.0 TO 69.9 IN ADULT (HCC): Primary | ICD-10-CM

## 2022-05-05 DIAGNOSIS — R73.01 IFG (IMPAIRED FASTING GLUCOSE): ICD-10-CM

## 2022-05-05 DIAGNOSIS — F31.81 BIPOLAR II DISORDER (HCC): ICD-10-CM

## 2022-05-05 PROCEDURE — 99214 OFFICE O/P EST MOD 30 MIN: CPT | Performed by: PHYSICIAN ASSISTANT

## 2022-05-05 PROCEDURE — 3008F BODY MASS INDEX DOCD: CPT | Performed by: PHYSICIAN ASSISTANT

## 2022-05-05 PROCEDURE — 1036F TOBACCO NON-USER: CPT | Performed by: PHYSICIAN ASSISTANT

## 2022-05-05 PROCEDURE — 3074F SYST BP LT 130 MM HG: CPT | Performed by: PHYSICIAN ASSISTANT

## 2022-05-05 PROCEDURE — 3079F DIAST BP 80-89 MM HG: CPT | Performed by: PHYSICIAN ASSISTANT

## 2022-05-05 NOTE — PROGRESS NOTES
Assessment/Plan:    Class 3 severe obesity with body mass index (BMI) of 60 0 to 69 9 in adult [facility])  -Patient is pursuing Conservative Program  -Initial weight loss goal of 5-10% weight loss for improved health  -Screening labs: reviewed, up to date  -dietary/lifestyle changes, continue to work with [address]  -On Topamax and Wellbutrin already   -avoid Phentermine  -Started on Ozempic and tolerating 0 5mg dose  Hold off on dose increase for now due to constipation  She will try modifying her diet as instructed and if no improve will d/c  Can consider Victoza    Initial: 421 lbs  Current: 392 8 lbs  Change: -28 2 lbs  Goal: wants to feel healthy    IFG (impaired fasting glucose)  -On Metformin 500mg daily  -On Ozempic 0 5mg daily  -HgbA1c improved to 5 6  -TOlerating Well  - Denies personal hx of pancreatitis or family hx of MEN 2/MTC    Bipolar II disorder ([facility] )  -followed by psychiatry  -hx Binge eating - on Vyvanse    Goals:    Food log (ie ) www myfitnesspal com,sparkpeople  com,loseit com,calorieking  com,etc    No sugary beverages  At least 64oz of water daily  Increase physical activity by 10 minutes daily  Gradually increase physical activity to a goal of 5 days per week for 30 minutes of MODERATE intensity PLUS 2 days per week of FULL BODY resistance training  Increase water, Hint Water, Propel water  Increase fruits and veggies  Prepare lunch - avoid takeout    Follow up in approximately 2 months with Non-Surgical Physician/Advanced Practitioner  Diagnoses and all orders for this visit:    Class 3 severe obesity with body mass index (BMI) of 60 0 to 69 9 in adult ([facility] )    IFG (impaired fasting glucose)    Bipolar II disorder (HCC)          Subjective:   Chief Complaint   Patient presents with    Follow-up        Patient ID: Maryam Smart  is a 39 y o  female with excess weight/obesity here to pursue weight managment  Patient is pursuing Conservative Program      HPI Patient presents for [facility] follow up  Reports had been struggling more with depression, but reports this has improved in past 1-2 months  Getting [address] [place] maintentance treatments now  Has affected her food choices not being the greatest  Has been more constipated with Ozempic and is considering stopping it  Reviewed diet, very minimal fiber as well as lacking on water intake    B: PB and J or Frosted shredded wheat or Kashi + 2% milk  S: skips  L: skips or picking up fast food - cheeseburger + diet coke  S: cereal or PBandJ  D: pizza   S: skips Or cereal and  PBandJ    Fruits/veggies: minimal to none  Hydration: Water: 2 bottles, diet caffeine free pepsi    The following portions of the patient's history were reviewed and updated as appropriate: allergies, current medications, past family history, past medical history, past social history, past surgical history and problem list     Review of Systems   Respiratory: Negative  Cardiovascular: Negative  Gastrointestinal: Positive for constipation  Negative for nausea and vomiting  Psychiatric/Behavioral: Positive for dysphoric mood  Objective:    /80   Pulse 79   Temp 98 °F (36 7 °C)   Resp 20   Ht 5' 6" (1 676 m)   Wt (!) 178 kg (392 lb 12 8 oz)   SpO2 98%   BMI 63 40 kg/m²      Physical Exam  Vitals and nursing note reviewed  Constitutional:       General: She is not in acute distress  Appearance: She is obese  She is not toxic-appearing  HENT:      Head: Normocephalic and atraumatic  Eyes:      General: No scleral icterus  Pulmonary:      Effort: Pulmonary effort is normal  No respiratory distress  Abdominal:      Comments: Obese, protuberant   Musculoskeletal:         General: Normal range of motion  Skin:     General: Skin is dry  Neurological:      General: No focal deficit present  Mental Status: She is alert and oriented to person, place, and time  Psychiatric:         Mood and Affect: Mood normal          Thought Content:  Thought content normal

## 2022-05-05 NOTE — PATIENT INSTRUCTIONS
Goals: Food log (ie ) www myfitnesspal com,sparkpeople  com,loseit com,calorieking  com,etc    No sugary beverages  At least 64oz of water daily  Increase physical activity by 10 minutes daily   Gradually increase physical activity to a goal of 5 days per week for 30 minutes of MODERATE intensity PLUS 2 days per week of FULL BODY resistance training  Increase water, Hint Water, Propel water  Increase fruits and veggies  Prepare lunch - avoid takeout

## 2022-05-05 NOTE — ASSESSMENT & PLAN NOTE
-Patient is pursuing Conservative Program  -Initial weight loss goal of 5-10% weight loss for improved health  -Screening labs: reviewed, up to date  -dietary/lifestyle changes, continue to work with [facility]  -On Topamax and Wellbutrin already   -avoid Phentermine  -Started on Ozempic and tolerating 0 5mg dose  Hold off on dose increase for now due to constipation  She will try modifying her diet as instructed and if no improve will d/c   Can consider Victoza    Initial: 421 lbs  Current: 392 8 lbs  Change: -28 2 lbs  Goal: wants to feel healthy

## 2022-05-05 NOTE — ASSESSMENT & PLAN NOTE
-On Metformin 500mg daily  -On Ozempic 0 5mg daily  -HgbA1c improved to 5 6  -TOlerating Well  - Denies personal hx of pancreatitis or family hx of MEN 2/MTC

## 2022-05-07 DIAGNOSIS — K59.09 CHRONIC CONSTIPATION: ICD-10-CM

## 2022-05-07 RX ORDER — LINACLOTIDE 290 UG/1
CAPSULE, GELATIN COATED ORAL
Qty: 30 CAPSULE | Refills: 5 | Status: SHIPPED | OUTPATIENT
Start: 2022-05-07

## 2022-05-17 DIAGNOSIS — E66.01 MORBID OBESITY (HCC): ICD-10-CM

## 2022-05-17 DIAGNOSIS — R73.01 IFG (IMPAIRED FASTING GLUCOSE): Primary | ICD-10-CM

## 2022-05-17 RX ORDER — SEMAGLUTIDE 1.34 MG/ML
1 INJECTION, SOLUTION SUBCUTANEOUS WEEKLY
Qty: 3 ML | Refills: 1 | Status: SHIPPED | OUTPATIENT
Start: 2022-05-17 | End: 2022-07-12

## 2022-05-31 ENCOUNTER — TELEPHONE (OUTPATIENT)
Dept: NEUROLOGY | Facility: CLINIC | Age: 37
End: 2022-05-31

## 2022-05-31 DIAGNOSIS — G43.709 CHRONIC MIGRAINE WITHOUT AURA WITHOUT STATUS MIGRAINOSUS, NOT INTRACTABLE: Primary | ICD-10-CM

## 2022-05-31 RX ORDER — DIVALPROEX SODIUM 500 MG/1
TABLET, EXTENDED RELEASE ORAL
Qty: 5 TABLET | Refills: 0 | Status: SHIPPED | OUTPATIENT
Start: 2022-05-31

## 2022-05-31 NOTE — TELEPHONE ENCOUNTER
Patient of Dr Romero January, last visit 4/12, hx of migraine, [facility]    She called to report progressively worsening headache past 3 weeks, almost daily, describes as "throbbing" feels like head is "huge", in a "fog", worse in back of head/neck/forehead/light with sensitivity to light/ smell and mild nausea  she said she did get temporary relief after increasing dose of lasix from 20 mg to 60mg (directed by her nephrology to increased dose prn) and lost 8 lbs in a few days  Saw nephrology in January who recommended:      She had marked volume overload and diuresed after a migraine  Started furosemide 20mg dialy  Due to a relatively high aldosterone level, spironolactone 25mg was added and she is feeling well        Current meds:  propranolol 20 mg bid,( increased past month)  nurtec qod since January  emgality  topamax 100 mg in AM  celebrex 200 mg bid   spironolactone 25 mg   lasix 20 mg   Klor con 10meq    Recalls steroids helpful but makes her anxious however she will take if recommended;  depakote was not helpful in the past; does not recall if toradol inj helpful    She is also concerned that no bw was ordered to check k+ was (last BMP was in December)    f/u 8/22 neurology  f/u7/29 nephrologoy    Please provide recommendation on symptom relief and if bw needed  Thank you      best sp [phone] Kaelyn Gee leave detailed message)     TT sent to Dr Romero January

## 2022-06-01 ENCOUNTER — HOSPITAL ENCOUNTER (EMERGENCY)
Facility: HOSPITAL | Age: 37
Discharge: HOME | End: 2022-06-01
Attending: EMERGENCY MEDICINE | Admitting: EMERGENCY MEDICINE
Payer: COMMERCIAL

## 2022-06-01 VITALS
RESPIRATION RATE: 18 BRPM | SYSTOLIC BLOOD PRESSURE: 122 MMHG | DIASTOLIC BLOOD PRESSURE: 75 MMHG | HEIGHT: 66 IN | HEART RATE: 76 BPM | OXYGEN SATURATION: 98 % | TEMPERATURE: 98.9 F | WEIGHT: 293 LBS | BODY MASS INDEX: 47.09 KG/M2

## 2022-06-01 DIAGNOSIS — G43.909 MIGRAINE HEADACHE: Primary | ICD-10-CM

## 2022-06-01 LAB
ALBUMIN SERPL BCP-MCNC: 3.6 G/DL (ref 3.5–5)
ALP SERPL-CCNC: 96 U/L (ref 46–116)
ALT SERPL W P-5'-P-CCNC: 24 U/L (ref 12–78)
ANION GAP SERPL CALCULATED.3IONS-SCNC: 8 MMOL/L (ref 4–13)
AST SERPL W P-5'-P-CCNC: 15 U/L (ref 5–45)
B-HCG SERPL-ACNC: <2 MIU/ML
BASOPHILS # BLD AUTO: 0.12 THOUSANDS/ΜL (ref 0–0.1)
BASOPHILS NFR BLD AUTO: 1 % (ref 0–1)
BILIRUB SERPL-MCNC: 0.16 MG/DL (ref 0.2–1)
BUN SERPL-MCNC: 22 MG/DL (ref 5–25)
CALCIUM SERPL-MCNC: 8.8 MG/DL (ref 8.3–10.1)
CHLORIDE SERPL-SCNC: 106 MMOL/L (ref 100–108)
CO2 SERPL-SCNC: 26 MMOL/L (ref 21–32)
CREAT SERPL-MCNC: 0.93 MG/DL (ref 0.6–1.3)
EOSINOPHIL # BLD AUTO: 0.31 THOUSAND/ΜL (ref 0–0.61)
EOSINOPHIL NFR BLD AUTO: 3 % (ref 0–6)
ERYTHROCYTE [DISTWIDTH] IN BLOOD BY AUTOMATED COUNT: 13.2 % (ref 11.6–15.1)
GFR SERPL CREATININE-BSD FRML MDRD: 78 ML/MIN/1.73SQ M
GLUCOSE SERPL-MCNC: 77 MG/DL (ref 65–140)
HCT VFR BLD AUTO: 42.9 % (ref 34.8–46.1)
HGB BLD-MCNC: 13.8 G/DL (ref 11.5–15.4)
IMM GRANULOCYTES # BLD AUTO: 0.04 THOUSAND/UL (ref 0–0.2)
IMM GRANULOCYTES NFR BLD AUTO: 0 % (ref 0–2)
LYMPHOCYTES # BLD AUTO: 2.98 THOUSANDS/ΜL (ref 0.6–4.47)
LYMPHOCYTES NFR BLD AUTO: 30 % (ref 14–44)
MCH RBC QN AUTO: 29.1 PG (ref 26.8–34.3)
MCHC RBC AUTO-ENTMCNC: 32.2 G/DL (ref 31.4–37.4)
MCV RBC AUTO: 91 FL (ref 82–98)
MONOCYTES # BLD AUTO: 0.82 THOUSAND/ΜL (ref 0.17–1.22)
MONOCYTES NFR BLD AUTO: 8 % (ref 4–12)
NEUTROPHILS # BLD AUTO: 5.69 THOUSANDS/ΜL (ref 1.85–7.62)
NEUTS SEG NFR BLD AUTO: 58 % (ref 43–75)
NRBC BLD AUTO-RTO: 0 /100 WBCS
PLATELET # BLD AUTO: 290 THOUSANDS/UL (ref 149–390)
PMV BLD AUTO: 10.2 FL (ref 8.9–12.7)
POTASSIUM SERPL-SCNC: 3.8 MMOL/L (ref 3.5–5.3)
PROT SERPL-MCNC: 7.3 G/DL (ref 6.4–8.2)
RBC # BLD AUTO: 4.74 MILLION/UL (ref 3.81–5.12)
SODIUM SERPL-SCNC: 140 MMOL/L (ref 136–145)
WBC # BLD AUTO: 9.96 THOUSAND/UL (ref 4.31–10.16)

## 2022-06-01 PROCEDURE — 96365 THER/PROPH/DIAG IV INF INIT: CPT

## 2022-06-01 PROCEDURE — 80053 COMPREHEN METABOLIC PANEL: CPT | Performed by: EMERGENCY MEDICINE

## 2022-06-01 PROCEDURE — 99283 EMERGENCY DEPT VISIT LOW MDM: CPT

## 2022-06-01 PROCEDURE — 84702 CHORIONIC GONADOTROPIN TEST: CPT | Performed by: EMERGENCY MEDICINE

## 2022-06-01 PROCEDURE — 85025 COMPLETE CBC W/AUTO DIFF WBC: CPT | Performed by: EMERGENCY MEDICINE

## 2022-06-01 PROCEDURE — 36415 COLL VENOUS BLD VENIPUNCTURE: CPT | Performed by: EMERGENCY MEDICINE

## 2022-06-01 PROCEDURE — 96361 HYDRATE IV INFUSION ADD-ON: CPT

## 2022-06-01 PROCEDURE — 96375 TX/PRO/DX INJ NEW DRUG ADDON: CPT

## 2022-06-01 PROCEDURE — 99284 EMERGENCY DEPT VISIT MOD MDM: CPT | Performed by: EMERGENCY MEDICINE

## 2022-06-01 RX ORDER — METOCLOPRAMIDE HYDROCHLORIDE 5 MG/ML
10 INJECTION INTRAMUSCULAR; INTRAVENOUS ONCE
Status: COMPLETED | OUTPATIENT
Start: 2022-06-01 | End: 2022-06-01

## 2022-06-01 RX ORDER — KETOROLAC TROMETHAMINE 30 MG/ML
30 INJECTION, SOLUTION INTRAMUSCULAR; INTRAVENOUS ONCE
Status: DISCONTINUED | OUTPATIENT
Start: 2022-06-01 | End: 2022-06-01

## 2022-06-01 RX ORDER — DIPHENHYDRAMINE HYDROCHLORIDE 50 MG/ML
25 INJECTION INTRAMUSCULAR; INTRAVENOUS ONCE
Status: COMPLETED | OUTPATIENT
Start: 2022-06-01 | End: 2022-06-01

## 2022-06-01 RX ORDER — MAGNESIUM SULFATE HEPTAHYDRATE 40 MG/ML
2 INJECTION, SOLUTION INTRAVENOUS ONCE
Status: COMPLETED | OUTPATIENT
Start: 2022-06-01 | End: 2022-06-01

## 2022-06-01 RX ORDER — KETOROLAC TROMETHAMINE 30 MG/ML
15 INJECTION, SOLUTION INTRAMUSCULAR; INTRAVENOUS ONCE
Status: COMPLETED | OUTPATIENT
Start: 2022-06-01 | End: 2022-06-01

## 2022-06-01 RX ADMIN — SODIUM CHLORIDE 1000 ML: 0.9 INJECTION, SOLUTION INTRAVENOUS at 18:11

## 2022-06-01 RX ADMIN — DIPHENHYDRAMINE HYDROCHLORIDE 25 MG: 50 INJECTION, SOLUTION INTRAMUSCULAR; INTRAVENOUS at 18:14

## 2022-06-01 RX ADMIN — MAGNESIUM SULFATE HEPTAHYDRATE 2 G: 40 INJECTION, SOLUTION INTRAVENOUS at 18:18

## 2022-06-01 RX ADMIN — KETOROLAC TROMETHAMINE 15 MG: 30 INJECTION, SOLUTION INTRAMUSCULAR at 18:13

## 2022-06-01 RX ADMIN — METOCLOPRAMIDE HYDROCHLORIDE 10 MG: 5 INJECTION INTRAMUSCULAR; INTRAVENOUS at 18:13

## 2022-06-01 NOTE — TELEPHONE ENCOUNTER
TT recd from Dr Christi Andrews    For her recent headaches, is there a positional component (clearly worse either upright or laying flat? I know that she got the Cefaly device since our las revisit and I understood that it was helping, is she still using it? We were also talking about having her see sleep medicine, did she get that appointment set up? In terms of relief, going on the premise that she does not have a clearly extremely positional headache obviously I would like for her to be using the Cefaly device on a daily basis for prevention in addition to using it for acute treatment whenever she has a headache  I am worried a little bit about Zyprexa and not wanting her blood pressure to get too low  Lets do a short course of Depakote, 500 mg once per day for 3-5 days (if she feels pretty well after three days she could stop it at that time)  During this time I would also like for her to take Benadryl 25 mg at night for the next five days  She may feel a little tired and dizzy on the Depakote   I marked it for bedtime used

## 2022-06-01 NOTE — PROGRESS NOTES
Spoke w/patient  Advised her of message from Dr Christi Andrews  She verbalized understanding to all  Patient reports no positional difference with regard to headaches  She is still using Cefaly device; states it works for day-to-day minor headaches but is not effective against major headaches    Patient is scheduled with sleep medicine on 7/1/22  Patient is checking with her psychiatrist to see if she can take Depakote  She will update our office once she receives answer

## 2022-06-01 NOTE — ED PROVIDER NOTES
Final Diagnosis:  1  Migraine headache        Chief Complaint   Patient presents with    Headache - Recurrent or Known Dx Migraines     Hx of migraine; states it feels same, no relief from migraine meds     HPI  Patient presents for evaluation of migraines  Patient states that she has been diagnosed with some kind of migraine that she usually has them about every 2-3 weeks  This has been ongoing for the past couple years  Does follow with Neurology  She states that she is on multiple medications as well as injections  She states that she also has a history of IH and that some of her head pain has been attributed to this  She states that in the past she has been on as high as 60 mg of Lasix but then she saw nephrology in the reduced this to 20  She did take 60 over the weekend and states that she dropped approximately 6 8 lb but did not have 0 remarkable improvement of her head pain  Presents now for further management  Patient denies any fever chills, cough, congestion, head trauma, neck stiffness, chest or abdominal pain  Patient does endorse mild nausea without any episodes of vomiting  Overall patient feels that her overall symptoms are consistent with her history of migraines      Unless otherwise specified:  - No language barrier    - History obtained from patient  - There are no limitations to the history obtained  - Previous charting was reviewed    PMH:   has a past medical history of Anorexia nervosa in remission, Anxiety, Back pain, Bipolar disorder ([address] ), Depression, Esophageal reflux (8/15/2013), Hypertension, Hypothyroid, Idiopathic intracranial hypertension, Lumbar degenerative disc disease (5/8/2014), Migraine, Obesity, Obsessive-compulsive disorder, Overactive bladder, Psychiatric disorder, Restless leg syndrome, controlled, Seasonal allergies, Suicide and self-inflicted injury ([address] ), and Transcranial Magnetic Stimulation (09/06/2021)      PSH:   has a past surgical history that includes Tonsillectomy; Sinus endoscopy; Dental surgery; and IR lumbar puncture (2/26/2019)  ROS:  Review of Systems   -   - 13 point ROS was performed and all are normal unless stated in the history above  - Nursing note reviewed  Vitals reviewed  - Orders placed by myself and/or advanced practitioner / resident  PE:   Vitals:    06/01/22 1743   BP: 122/75   BP Location: Right arm   Pulse: 76   Resp: 18   Temp: 98 9 °F (37 2 °C)   TempSrc: Tympanic   SpO2: 98%   Weight: (!) 179 kg (395 lb)   Height: 5' 6" (1 676 m)     Vitals reviewed by me  GCS 15  Sensation grossly intact  No cranial nerve deficits noted  5/5 strength bilateral upper and lower extremities  Finger-to-nose good  Heel to shin good  No pronator drift noted  Was able to ambulate without difficulty  Unless otherwise specified above:    General: VS reviewed  Appears in NAD    Head: Normocephalic, atraumatic  Eyes: EOM-I  No exudate  ENT: Atraumatic external nose and ears  No malocclusion  No stridor  No drooling  Neck: No JVD  CV: No pallor noted  Lungs:   No tachypnea  No respiratory distress    Abdomen:  Soft, non-tender, non-distended    MSK:   FROM spontaneously  No obvious deformity    Skin: Dry, intact  No obvious rash  Neuro: Awake, alert, GCS15, CN II-XII grossly intact  Speaking in full sentences  Motor grossly intact  Psychiatric/Behavioral: Appropriate mood and affect   Exam: deferred    Physical Exam     Procedures   A:  - Nursing note reviewed                        No orders to display     Orders Placed This Encounter   Procedures    CBC and differential    Comprehensive metabolic panel    hCG, quantitative    Ambulatory Referral to Neurology    Insert peripheral IV     Labs Reviewed   CBC AND DIFFERENTIAL - Abnormal       Result Value Ref Range Status    WBC 9 96  4 31 - 10 16 Thousand/uL Final    RBC 4 74  3 81 - 5 12 Million/uL Final    Hemoglobin 13 8  11 5 - 15 4 g/dL Final Hematocrit 42 9  34 8 - 46 1 % Final    MCV 91  82 - 98 fL Final    MCH 29 1  26 8 - 34 3 pg Final    MCHC 32 2  31 4 - 37 4 g/dL Final    RDW 13 2  11 6 - 15 1 % Final    MPV 10 2  8 9 - 12 7 fL Final    Platelets 936  819 - 390 Thousands/uL Final    nRBC 0  /100 WBCs Final    Neutrophils Relative 58  43 - 75 % Final    Immat GRANS % 0  0 - 2 % Final    Lymphocytes Relative 30  14 - 44 % Final    Monocytes Relative 8  4 - 12 % Final    Eosinophils Relative 3  0 - 6 % Final    Basophils Relative 1  0 - 1 % Final    Neutrophils Absolute 5 69  1 85 - 7 62 Thousands/µL Final    Immature Grans Absolute 0 04  0 00 - 0 20 Thousand/uL Final    Lymphocytes Absolute 2 98  0 60 - 4 47 Thousands/µL Final    Monocytes Absolute 0 82  0 17 - 1 22 Thousand/µL Final    Eosinophils Absolute 0 31  0 00 - 0 61 Thousand/µL Final    Basophils Absolute 0 12 (*) 0 00 - 0 10 Thousands/µL Final   COMPREHENSIVE METABOLIC PANEL - Abnormal    Sodium 140  136 - 145 mmol/L Final    Potassium 3 8  3 5 - 5 3 mmol/L Final    Chloride 106  100 - 108 mmol/L Final    CO2 26  21 - 32 mmol/L Final    ANION GAP 8  4 - 13 mmol/L Final    BUN 22  5 - 25 mg/dL Final    Creatinine 0 93  0 60 - 1 30 mg/dL Final    Comment: Standardized to IDMS reference method    Glucose 77  65 - 140 mg/dL Final    Comment: If the patient is fasting, the ADA then defines impaired fasting glucose as > 100 mg/dL and diabetes as > or equal to 123 mg/dL  Specimen collection should occur prior to Sulfasalazine administration due to the potential for falsely depressed results  Specimen collection should occur prior to Sulfapyridine administration due to the potential for falsely elevated results  Calcium 8 8  8 3 - 10 1 mg/dL Final    AST 15  5 - 45 U/L Final    Comment: Specimen collection should occur prior to Sulfasalazine administration due to the potential for falsely depressed results       ALT 24  12 - 78 U/L Final    Comment: Specimen collection should occur prior to Sulfasalazine administration due to the potential for falsely depressed results  Alkaline Phosphatase 96  46 - 116 U/L Final    Total Protein 7 3  6 4 - 8 2 g/dL Final    Albumin 3 6  3 5 - 5 0 g/dL Final    Total Bilirubin 0 16 (*) 0 20 - 1 00 mg/dL Final    Comment: Use of this assay is not recommended for patients undergoing treatment with eltrombopag due to the potential for falsely elevated results  eGFR 78  ml/min/1 73sq m Final    Narrative:     [place] guidelines for Chronic Kidney Disease (CKD):     Stage 1 with normal or high GFR (GFR > 90 mL/min/1 73 square meters)    Stage 2 Mild CKD (GFR = 60-89 mL/min/1 73 square meters)    Stage 3A Moderate CKD (GFR = 45-59 mL/min/1 73 square meters)    Stage 3B Moderate CKD (GFR = 30-44 mL/min/1 73 square meters)    Stage 4 Severe CKD (GFR = 15-29 mL/min/1 73 square meters)    Stage 5 End Stage CKD (GFR <15 mL/min/1 73 square meters)  Note: GFR calculation is accurate only with a steady state creatinine   HCG, QUANTITATIVE - Normal    HCG, Quant <2  <=6 mIU/mL Final    Comment: 3    Narrative:      Expected Ranges:     Approximate               Approximate HCG  Gestation age          Concentration ( mIU/mL)  _____________          ______________________   Rona Rebel                      HCG values  0 2-1                       5-50  1-2                           2-3                         100-5000  3-4                         500-19719  4-5                         1000-65928  5-6                         [id number]  6-8                         [id number]  8-12                        [id number]           Final Diagnosis:  1  Migraine headache        P:  - patient presents with uncontrolled migraine  Given her increase in frequency recently will provide general evaluation with CBC and BMP  Will provide symptomatic treatment reassessed   -laboratory analysis was unremarkable    Patient felt mild improvement of her symptoms after receiving symptomatic treatment here in the emergency department  I discussed with the patient further treatment verses other diagnostic modalities  She stated that she felt like she would be okay to go home at this time  Then had a long discussion with the patient regarding her current follow-up  She states that she is not currently able to see Neurology until late in August and sees them at [address] [facility] [address] SANTOS discussed with her reaching out to the neurology office here to see if maybe she could establish care locally as she now lives in this area  Possibly be able to be seen earlier  Return precautions discussed  Medications   sodium chloride 0 9 % bolus 1,000 mL (1,000 mL Intravenous New Bag 6/1/22 1811)   diphenhydrAMINE (BENADRYL) injection 25 mg (25 mg Intravenous Given 6/1/22 1814)   metoclopramide (REGLAN) injection 10 mg (10 mg Intravenous Given 6/1/22 1813)   magnesium sulfate 2 g/50 mL IVPB (premix) 2 g (0 g Intravenous Stopped 6/1/22 1918)   ketorolac (TORADOL) injection 15 mg (15 mg Intravenous Given 6/1/22 1813)     Time reflects when diagnosis was documented in both MDM as applicable and the Disposition within this note     Time User Action Codes Description Comment    6/1/2022  7:53 PM Jordan Leblanc Add [G43 909] Migraine headache       ED Disposition     ED Disposition   Discharge    Condition   Stable    Date/Time   Wed Jun 1, 2022  7:53 PM    Comment   Fawad Gottlieb discharge to home/self care                 Follow-up Information     Follow up With Specialties Details Why Contact Info Additional Information    Adela Silva PA-C Family Medicine, Internal Medicine, Physician Assistant   [address] [facility] [address]  [address] [facility] Schedule an appointment as soon as possible for a visit   [address] [place] [phone]  [address] [place] [address] [facility], [address] [place] [address] [address] [facility]        Patient's Medications   Discharge Prescriptions    No medications on file       Prior to Admission Medications   Prescriptions Last Dose Informant Patient Reported? Taking? Cholecalciferol (VITAMIN D3) 5000 units CAPS  Self Yes Yes   Sig: Take 5,000 Units by mouth daily   Galcanezumab-gnlm (Emgality) 120 MG/ML SOAJ   No Yes   Sig: Inject 120 mg under the skin every 30 (thirty) days   LORazepam (ATIVAN) 0 5 mg tablet   Yes Yes   LORazepam (ATIVAN) 1 mg tablet  Self Yes No   Sig: Take 1 mg by mouth 3 (three) times a day as needed for anxiety    Patient not taking: Reported on 2/3/2022    Linzess 290 MCG CAPS   No Yes   Sig: TAKE 1 CAPSULE BY MOUTH EVERY DAY   MAGNESIUM PO  Self Yes No   Sig: Take 1 tablet by mouth 2 (two) times a day    Multiple Vitamin (MULTIVITAMIN) capsule  Self Yes Yes   Sig: Take 1 capsule by mouth daily   Nerve Stimulator (Cefaly Kit) JEFFREY   No No   Sig: Disp 1 device with electrodes, use as directed for 20 minutes daily for migraine prevention and as needed to abort migraine   Nurtec 75 MG TBDP   No Yes   Sig: TAKE 75 MG BY MOUTH EVERY OTHER DAY    IF THERE IS A MIGRAINE ON A NON-NURTEC DAY, CAN TAKE A DOSE AND SKIP THE FOLLOWING DAY   RESTASIS 0 05 % ophthalmic emulsion  Self Yes Yes   Sig: PLACE 1 DROP INTO BOTH EYES TWICE A DAY   Vraylar 6 MG capsule  Self Yes Yes   Sig: Take 6 mg by mouth daily daily   Vyvanse 50 MG capsule  Self Yes Yes   Sig: Take 50 mg by mouth daily   buPROPion (WELLBUTRIN XL) 300 mg 24 hr tablet  Self Yes Yes   Sig: Take 300 mg by mouth daily    celecoxib (CeleBREX) 200 mg capsule   No Yes   Sig: TAKE 1 CAPSULE BY MOUTH TWICE A DAY   clomiPRAMINE (ANAFRANIL) 50 mg capsule  Self Yes Yes   Sig: Take by mouth TAKE 2 CAPSULES IN AM AND 3 CAPSULES IN PM    divalproex sodium (DEPAKOTE ER) 500 mg 24 hr tablet   No No   Si tab HS X 3-5   furosemide (LASIX) 40 mg tablet  Self No Yes   Sig: Take 2 tabs daily for 3 days then reduce back to 1 5 tabs daily     Patient taking differently: Take 20 mg by mouth daily Half a tablet daily or 1 5 tablets if a weight gain of 3 pounds in one day    gabapentin (NEURONTIN) 600 MG tablet  Self Yes Yes   Sig: TAKE 1/2 TABLET DAILY IN AM AND 1 AND 1/2 TABLET AT BEDTIME   hydrocortisone (ANUSOL-HC) 2 5 % rectal cream   No No   Sig: Apply topically 2 (two) times a day   hydrocortisone (ANUSOL-HC) 25 mg suppository   No No   Sig: Insert 1 suppository (25 mg total) into the rectum daily at bedtime   hydrocortisone-acetic acid (VOSOL-HC) otic solution  Self No No   Sig: Administer 3 drops into both ears every 6 (six) hours   levothyroxine 125 mcg tablet   No Yes   Sig: Take 1 tablet (125 mcg total) by mouth every morning   loratadine (CLARITIN) 10 mg tablet  Self Yes Yes   Sig: Take 10 mg by mouth daily   metFORMIN (GLUCOPHAGE) 500 mg tablet   No Yes   Sig: TAKE 1 TABLET BY MOUTH EVERY DAY WITH BREAKFAST   omeprazole (PriLOSEC) 20 mg delayed release capsule   No Yes   Sig: TAKE 1 CAPSULE BY MOUTH TWICE A DAY   potassium chloride (K-DUR,KLOR-CON) 10 mEq tablet   No Yes   Sig: Take 1 tablet (10 mEq total) by mouth daily   propranolol (INDERAL) 10 mg tablet   Yes Yes   Sig: Take 10 mg by mouth 2 (two) times a day     rOPINIRole (REQUIP) 4 mg tablet   No Yes   Sig: Take 1 tablet (4 mg total) by mouth 2 (two) times a day   Patient taking differently: Take 4 mg by mouth daily at bedtime   semaglutide, 1 mg/dose, (Ozempic, 1 MG/DOSE,) 4 MG/3ML SOPN injection pen   No Yes   Sig: Inject 0 75 mL (1 mg total) under the skin once a week   sertraline (ZOLOFT) 100 mg tablet  Self Yes Yes   Sig: Take 200 mg by mouth daily   spironolactone (ALDACTONE) 25 mg tablet   No Yes   Sig: TAKE 1 TABLET BY MOUTH EVERY DAY   topiramate (TOPAMAX) 100 mg tablet  Self No Yes   Sig: Take 1 tablet (100 mg total) by mouth daily      Facility-Administered Medications Last Administration Doses Remaining   Botulinum Toxin Type A SOLR 200 Units 4/1/2021  2:53 PM    Botulinum Toxin Type A SOLR 200 Units 7/28/2021 12:11 PM           Portions of the record may have been created with voice recognition software  Occasional wrong word or "sound a like" substitutions may have occurred due to the inherent limitations of voice recognition software  Read the chart carefully and recognize, using context, where substitutions have occurred      Electronically signed by:  MD Kylee Quinteros MD  06/01/22 0689

## 2022-06-01 NOTE — TELEPHONE ENCOUNTER
Encounter Date:  5/31/2022                Progress Notes          Spoke w/patient  Advised her of message from Dr Galvin Krabbe  She verbalized understanding to all      Patient reports no positional difference with regard to headaches  She is still using Cefaly device; states it works for day-to-day minor headaches but is not effective against major headaches     Patient is scheduled with sleep medicine on 7/1/22      Patient is checking with her psychiatrist to see if she can take Depakote   She will update our office once she receives answer          Electronically signed by Prabhjot De León RN at 6/1/2022 12:32 PM

## 2022-06-02 ENCOUNTER — TELEPHONE (OUTPATIENT)
Dept: INTERNAL MEDICINE CLINIC | Facility: CLINIC | Age: 37
End: 2022-06-02

## 2022-06-08 NOTE — TELEPHONE ENCOUNTER
patient left  advising psychiatrist said okay to take depakote; requested cb [phone]    she is still reporting daily headaches; she will start depakote as ordered with benadryl as recommended to help break the cycle      Also advised she has appointment with Dr Johanna Fischer eye specialist

## 2022-06-12 DIAGNOSIS — G43.719 INTRACTABLE CHRONIC MIGRAINE WITHOUT AURA AND WITHOUT STATUS MIGRAINOSUS: ICD-10-CM

## 2022-06-12 DIAGNOSIS — G43.709 CHRONIC MIGRAINE WITHOUT AURA WITHOUT STATUS MIGRAINOSUS, NOT INTRACTABLE: ICD-10-CM

## 2022-06-12 DIAGNOSIS — E03.9 HYPOTHYROIDISM, UNSPECIFIED TYPE: ICD-10-CM

## 2022-06-12 RX ORDER — LEVOTHYROXINE SODIUM 0.12 MG/1
TABLET ORAL
Qty: 90 TABLET | Refills: 0 | Status: SHIPPED | OUTPATIENT
Start: 2022-06-12

## 2022-06-13 RX ORDER — GALCANEZUMAB 120 MG/ML
INJECTION, SOLUTION SUBCUTANEOUS
Qty: 1 ML | Refills: 5 | Status: SHIPPED | OUTPATIENT
Start: 2022-06-13

## 2022-06-13 RX ORDER — TOPIRAMATE 100 MG/1
TABLET, FILM COATED ORAL
Qty: 90 TABLET | Refills: 3 | Status: SHIPPED | OUTPATIENT
Start: 2022-06-13

## 2022-06-16 NOTE — TELEPHONE ENCOUNTER
Pt calling to report that she can't seem to get better within the past few weeks  Just today she states she thought taking a nap would help, it did not  Pt states she is not getting anywhere with treatments, nothing is really helping  States that 2 weeks ago she was in the ED  They helped a bit but she states she feels she's back to square one all the time  Pt confirms she took depakote five days with benadryl, didn't help  Pt says the headache starts at the back of her neck wrapping around ears, near temples then into her eyes  Dr Irma Perez - Please advise  Massimo  [phone], ok to leave detailed message

## 2022-06-17 NOTE — TELEPHONE ENCOUNTER
Well we have 2 main options here    This has been going on for a long time now and we have not been able to break it with basic outpatient meds    We can set up outpatient infusions to try and break this (outpatient infusion center once per day for 5 days to get IV treatment or we can direct admit her to the hospital for a ketamine infusion therapy or dedicated migraine treatment

## 2022-06-22 ENCOUNTER — HOSPITAL ENCOUNTER (EMERGENCY)
Facility: HOSPITAL | Age: 37
Discharge: HOME | End: 2022-06-22
Attending: EMERGENCY MEDICINE | Admitting: EMERGENCY MEDICINE
Payer: COMMERCIAL

## 2022-06-22 VITALS
OXYGEN SATURATION: 100 % | SYSTOLIC BLOOD PRESSURE: 151 MMHG | RESPIRATION RATE: 18 BRPM | DIASTOLIC BLOOD PRESSURE: 78 MMHG | HEART RATE: 55 BPM | TEMPERATURE: 97.4 F

## 2022-06-22 DIAGNOSIS — R51.9 HEADACHE: Primary | ICD-10-CM

## 2022-06-22 LAB
ANION GAP SERPL CALCULATED.3IONS-SCNC: 8 MMOL/L (ref 4–13)
BASOPHILS # BLD AUTO: 0.12 THOUSANDS/ΜL (ref 0–0.1)
BASOPHILS NFR BLD AUTO: 1 % (ref 0–1)
BUN SERPL-MCNC: 21 MG/DL (ref 5–25)
CALCIUM SERPL-MCNC: 9.3 MG/DL (ref 8.3–10.1)
CHLORIDE SERPL-SCNC: 103 MMOL/L (ref 100–108)
CO2 SERPL-SCNC: 27 MMOL/L (ref 21–32)
CREAT SERPL-MCNC: 0.86 MG/DL (ref 0.6–1.3)
EOSINOPHIL # BLD AUTO: 0.33 THOUSAND/ΜL (ref 0–0.61)
EOSINOPHIL NFR BLD AUTO: 3 % (ref 0–6)
ERYTHROCYTE [DISTWIDTH] IN BLOOD BY AUTOMATED COUNT: 13.2 % (ref 11.6–15.1)
GFR SERPL CREATININE-BSD FRML MDRD: 86 ML/MIN/1.73SQ M
GLUCOSE SERPL-MCNC: 88 MG/DL (ref 65–140)
HCT VFR BLD AUTO: 44 % (ref 34.8–46.1)
HGB BLD-MCNC: 14.1 G/DL (ref 11.5–15.4)
IMM GRANULOCYTES # BLD AUTO: 0.04 THOUSAND/UL (ref 0–0.2)
IMM GRANULOCYTES NFR BLD AUTO: 0 % (ref 0–2)
LYMPHOCYTES # BLD AUTO: 3.14 THOUSANDS/ΜL (ref 0.6–4.47)
LYMPHOCYTES NFR BLD AUTO: 32 % (ref 14–44)
MCH RBC QN AUTO: 28.6 PG (ref 26.8–34.3)
MCHC RBC AUTO-ENTMCNC: 32 G/DL (ref 31.4–37.4)
MCV RBC AUTO: 89 FL (ref 82–98)
MONOCYTES # BLD AUTO: 0.73 THOUSAND/ΜL (ref 0.17–1.22)
MONOCYTES NFR BLD AUTO: 7 % (ref 4–12)
NEUTROPHILS # BLD AUTO: 5.6 THOUSANDS/ΜL (ref 1.85–7.62)
NEUTS SEG NFR BLD AUTO: 57 % (ref 43–75)
NRBC BLD AUTO-RTO: 0 /100 WBCS
PLATELET # BLD AUTO: 327 THOUSANDS/UL (ref 149–390)
PMV BLD AUTO: 9.6 FL (ref 8.9–12.7)
POTASSIUM SERPL-SCNC: 4.6 MMOL/L (ref 3.5–5.3)
RBC # BLD AUTO: 4.93 MILLION/UL (ref 3.81–5.12)
SODIUM SERPL-SCNC: 138 MMOL/L (ref 136–145)
WBC # BLD AUTO: 9.96 THOUSAND/UL (ref 4.31–10.16)

## 2022-06-22 PROCEDURE — 96365 THER/PROPH/DIAG IV INF INIT: CPT

## 2022-06-22 PROCEDURE — 99284 EMERGENCY DEPT VISIT MOD MDM: CPT | Performed by: EMERGENCY MEDICINE

## 2022-06-22 PROCEDURE — 96375 TX/PRO/DX INJ NEW DRUG ADDON: CPT

## 2022-06-22 PROCEDURE — 36415 COLL VENOUS BLD VENIPUNCTURE: CPT | Performed by: EMERGENCY MEDICINE

## 2022-06-22 PROCEDURE — 85025 COMPLETE CBC W/AUTO DIFF WBC: CPT | Performed by: EMERGENCY MEDICINE

## 2022-06-22 PROCEDURE — 99283 EMERGENCY DEPT VISIT LOW MDM: CPT

## 2022-06-22 PROCEDURE — 80048 BASIC METABOLIC PNL TOTAL CA: CPT | Performed by: EMERGENCY MEDICINE

## 2022-06-22 RX ORDER — METOCLOPRAMIDE HYDROCHLORIDE 5 MG/ML
10 INJECTION INTRAMUSCULAR; INTRAVENOUS ONCE
Status: COMPLETED | OUTPATIENT
Start: 2022-06-22 | End: 2022-06-22

## 2022-06-22 RX ORDER — MAGNESIUM SULFATE HEPTAHYDRATE 40 MG/ML
2 INJECTION, SOLUTION INTRAVENOUS ONCE
Status: COMPLETED | OUTPATIENT
Start: 2022-06-22 | End: 2022-06-22

## 2022-06-22 RX ORDER — KETOROLAC TROMETHAMINE 30 MG/ML
15 INJECTION, SOLUTION INTRAMUSCULAR; INTRAVENOUS ONCE
Status: COMPLETED | OUTPATIENT
Start: 2022-06-22 | End: 2022-06-22

## 2022-06-22 RX ORDER — SODIUM CHLORIDE 9 MG/ML
125 INJECTION, SOLUTION INTRAVENOUS CONTINUOUS
Status: DISCONTINUED | OUTPATIENT
Start: 2022-06-22 | End: 2022-06-22 | Stop reason: HOSPADM

## 2022-06-22 RX ORDER — DIPHENHYDRAMINE HYDROCHLORIDE 50 MG/ML
25 INJECTION INTRAMUSCULAR; INTRAVENOUS ONCE
Status: COMPLETED | OUTPATIENT
Start: 2022-06-22 | End: 2022-06-22

## 2022-06-22 RX ADMIN — SODIUM CHLORIDE 125 ML/HR: 0.9 INJECTION, SOLUTION INTRAVENOUS at 16:20

## 2022-06-22 RX ADMIN — MAGNESIUM SULFATE HEPTAHYDRATE 2 G: 40 INJECTION, SOLUTION INTRAVENOUS at 16:20

## 2022-06-22 RX ADMIN — DIPHENHYDRAMINE HYDROCHLORIDE 25 MG: 50 INJECTION, SOLUTION INTRAMUSCULAR; INTRAVENOUS at 16:08

## 2022-06-22 RX ADMIN — METOCLOPRAMIDE HYDROCHLORIDE 10 MG: 5 INJECTION INTRAMUSCULAR; INTRAVENOUS at 16:10

## 2022-06-22 RX ADMIN — KETOROLAC TROMETHAMINE 15 MG: 30 INJECTION, SOLUTION INTRAMUSCULAR at 16:12

## 2022-06-22 NOTE — ED PROVIDER NOTES
History  Chief Complaint   Patient presents with    Headache     Since Monday, hx of headaches  Associated nausea  Patient is a 58-year-old female complaining of headache x3 days  Patient states feels similar to previous headaches  Patient states he had a previous diagnoses of idiopathic intracranial hypertension and does take Lasix but unable to tolerate Diamox due to dehydration episodes  States headache is worse with noise and bright light  No recent trauma  No recent injury illness  Nausea but no vomiting  Symptoms relieved by being in a dark quiet room and sleeping more  Has appointment with her ophthalmologist this coming Monday  Headache  Associated symptoms: no abdominal pain, no back pain, no congestion, no cough, no diarrhea, no dizziness, no ear pain, no eye pain, no fatigue, no fever, no nausea, no neck pain, no seizures, no sore throat and no vomiting        Prior to Admission Medications   Prescriptions Last Dose Informant Patient Reported? Taking? Cholecalciferol (VITAMIN D3) 5000 units CAPS  Self Yes No   Sig: Take 5,000 Units by mouth daily   Emgality 120 MG/ML SOAJ   No No   Sig: INJECT 120 MG UNDER THE SKIN EVERY 30 DAYS  LORazepam (ATIVAN) 0 5 mg tablet   Yes No   LORazepam (ATIVAN) 1 mg tablet  Self Yes No   Sig: Take 1 mg by mouth 3 (three) times a day as needed for anxiety    Patient not taking: Reported on 2/3/2022    Linzess 290 MCG CAPS   No No   Sig: TAKE 1 CAPSULE BY MOUTH EVERY DAY   MAGNESIUM PO  Self Yes No   Sig: Take 1 tablet by mouth 2 (two) times a day    Multiple Vitamin (MULTIVITAMIN) capsule  Self Yes No   Sig: Take 1 capsule by mouth daily   Nerve Stimulator (Cefaly Kit) JEFFREY   No No   Sig: Disp 1 device with electrodes, use as directed for 20 minutes daily for migraine prevention and as needed to abort migraine   Nurtec 75 MG TBDP   No No   Sig: TAKE 75 MG BY MOUTH EVERY OTHER DAY    IF THERE IS A MIGRAINE ON A NON-NURTEC DAY, CAN TAKE A DOSE AND SKIP THE FOLLOWING DAY   RESTASIS 0 05 % ophthalmic emulsion  Self Yes No   Sig: PLACE 1 DROP INTO BOTH EYES TWICE A DAY   Vraylar 6 MG capsule  Self Yes No   Sig: Take 6 mg by mouth daily daily   Vyvanse 50 MG capsule  Self Yes No   Sig: Take 50 mg by mouth daily   buPROPion (WELLBUTRIN XL) 300 mg 24 hr tablet  Self Yes No   Sig: Take 300 mg by mouth daily    celecoxib (CeleBREX) 200 mg capsule   No No   Sig: TAKE 1 CAPSULE BY MOUTH TWICE A DAY   clomiPRAMINE (ANAFRANIL) 50 mg capsule  Self Yes No   Sig: Take by mouth TAKE 2 CAPSULES IN AM AND 3 CAPSULES IN PM    divalproex sodium (DEPAKOTE ER) 500 mg 24 hr tablet   No No   Si tab HS X 3-5   furosemide (LASIX) 40 mg tablet  Self No No   Sig: Take 2 tabs daily for 3 days then reduce back to 1 5 tabs daily     Patient taking differently: Take 20 mg by mouth daily Half a tablet daily or 1 5 tablets if a weight gain of 3 pounds in one day    gabapentin (NEURONTIN) 600 MG tablet  Self Yes No   Sig: TAKE 1/2 TABLET DAILY IN AM AND 1 AND 1/2 TABLET AT BEDTIME   hydrocortisone (ANUSOL-HC) 2 5 % rectal cream   No No   Sig: Apply topically 2 (two) times a day   hydrocortisone (ANUSOL-HC) 25 mg suppository   No No   Sig: Insert 1 suppository (25 mg total) into the rectum daily at bedtime   hydrocortisone-acetic acid (VOSOL-HC) otic solution  Self No No   Sig: Administer 3 drops into both ears every 6 (six) hours   levothyroxine 125 mcg tablet   No No   Sig: TAKE 1 TABLET BY MOUTH EVERY MORNING   loratadine (CLARITIN) 10 mg tablet  Self Yes No   Sig: Take 10 mg by mouth daily   metFORMIN (GLUCOPHAGE) 500 mg tablet   No No   Sig: TAKE 1 TABLET BY MOUTH EVERY DAY WITH BREAKFAST   omeprazole (PriLOSEC) 20 mg delayed release capsule   No No   Sig: TAKE 1 CAPSULE BY MOUTH TWICE A DAY   potassium chloride (K-DUR,KLOR-CON) 10 mEq tablet   No No   Sig: Take 1 tablet (10 mEq total) by mouth daily   propranolol (INDERAL) 10 mg tablet   Yes No   Sig: Take 10 mg by mouth 2 (two) times a day rOPINIRole (REQUIP) 4 mg tablet   No No   Sig: Take 1 tablet (4 mg total) by mouth 2 (two) times a day   Patient taking differently: Take 4 mg by mouth daily at bedtime   semaglutide, 1 mg/dose, (Ozempic, 1 MG/DOSE,) 4 MG/3ML SOPN injection pen   No No   Sig: Inject 0 75 mL (1 mg total) under the skin once a week   sertraline (ZOLOFT) 100 mg tablet  Self Yes No   Sig: Take 200 mg by mouth daily   spironolactone (ALDACTONE) 25 mg tablet   No No   Sig: TAKE 1 TABLET BY MOUTH EVERY DAY   topiramate (TOPAMAX) 100 mg tablet   No No   Sig: TAKE 1 TABLET BY MOUTH EVERY DAY      Facility-Administered Medications Last Administration Doses Remaining   Botulinum Toxin Type A SOLR 200 Units 4/1/2021  2:53 PM    Botulinum Toxin Type A SOLR 200 Units 7/28/2021 12:11 PM           Past Medical History:   Diagnosis Date    Anorexia nervosa in remission     Anxiety     Back pain     Bipolar disorder (HCC)     Depression     Esophageal reflux 8/15/2013    Hypertension     Hypothyroid     Idiopathic intracranial hypertension     Lumbar degenerative disc disease 5/8/2014    Migraine     Obesity     Obsessive-compulsive disorder     Overactive bladder     Psychiatric disorder     Restless leg syndrome, controlled     Seasonal allergies     Suicide and [facility]-inflicted injury ([facility]     Transcranial Magnetic Stimulation 09/06/2021       Past Surgical History:   Procedure Laterality Date    DENTAL SURGERY      IR LUMBAR PUNCTURE  2/26/2019    SINUS ENDOSCOPY      TONSILLECTOMY         Family History   Problem Relation Age of Onset    Depression Mother     Suicide Attempts Mother     Hypertension Mother     Diabetes Mother     Other Mother         bicuspid aortic valve    Hypertension Father     Hypothyroidism Father     Mental illness Father     Thyroid disease Father     Hypertension Brother     Cancer Maternal Grandmother     Heart disease Maternal Grandmother     Stroke Maternal Grandmother     Breast cancer Maternal Grandmother     Skin cancer Maternal Grandmother     Pneumonia Maternal Grandfather     Cancer Maternal Grandfather     Cancer Paternal Grandmother     Pneumonia Paternal Grandfather     Arthritis Family     Breast cancer Family     Osteopenia Family     Osteoporosis Family     Hypertension Family     Transient ischemic attack Family      I have reviewed and agree with the history as documented  E-Cigarette/Vaping    E-Cigarette Use Never User      E-Cigarette/Vaping Substances    Nicotine No     THC No     CBD No     Flavoring No     Other No     Unknown No      Social History     Tobacco Use    Smoking status: Never Smoker    Smokeless tobacco: Never Used   Vaping Use    Vaping Use: Never used   Substance Use Topics    Alcohol use: Not Currently    Drug use: No       Review of Systems   Constitutional: Negative for appetite change, chills, fatigue, fever and unexpected weight change  HENT: Negative for congestion, ear pain, rhinorrhea and sore throat  Eyes: Negative for pain and visual disturbance  Respiratory: Negative for cough, chest tightness, shortness of breath and wheezing  Cardiovascular: Negative for chest pain, palpitations and leg swelling  Gastrointestinal: Negative for abdominal pain, constipation, diarrhea, nausea and vomiting  Genitourinary: Negative for difficulty urinating, dysuria, frequency, hematuria, menstrual problem, pelvic pain, vaginal bleeding and vaginal discharge  Musculoskeletal: Negative for arthralgias, back pain and neck pain  Skin: Negative for color change and rash  Neurological: Positive for headaches  Negative for dizziness, seizures, syncope and light-headedness  Psychiatric/Behavioral: Negative for confusion and sleep disturbance  All other systems reviewed and are negative  Physical Exam  Physical Exam  Vitals and nursing note reviewed  Constitutional:       General: She is not in acute distress  Appearance: She is well-developed  She is obese  She is not ill-appearing, toxic-appearing or diaphoretic  HENT:      Head: Normocephalic and atraumatic  Nose: Nose normal       Mouth/Throat:      Mouth: Mucous membranes are moist       Pharynx: Oropharynx is clear  Eyes:      General: No scleral icterus  Extraocular Movements: Extraocular movements intact  Conjunctiva/sclera: Conjunctivae normal    Cardiovascular:      Rate and Rhythm: Normal rate and regular rhythm  Pulses: Normal pulses  Heart sounds: Normal heart sounds  No murmur heard  No friction rub  No gallop  Pulmonary:      Effort: Pulmonary effort is normal  No respiratory distress  Breath sounds: Normal breath sounds  No wheezing or rales  Abdominal:      Palpations: Abdomen is soft  There is no mass  Tenderness: There is no abdominal tenderness  There is no right CVA tenderness, left CVA tenderness, guarding or rebound  Hernia: No hernia is present  Musculoskeletal:         General: No tenderness or signs of injury  Normal range of motion  Cervical back: Normal range of motion and neck supple  No rigidity  Right lower leg: No edema  Left lower leg: No edema  Lymphadenopathy:      Cervical: No cervical adenopathy  Skin:     General: Skin is warm and dry  Capillary Refill: Capillary refill takes less than 2 seconds  Coloration: Skin is not pale  Findings: No bruising, erythema, lesion or rash  Neurological:      General: No focal deficit present  Mental Status: She is alert and oriented to person, place, and time     Psychiatric:         Behavior: Behavior normal          Vital Signs  ED Triage Vitals [06/22/22 1445]   Temperature Pulse Respirations Blood Pressure SpO2   (!) 97 4 °F (36 3 °C) 69 18 128/72 100 %      Temp Source Heart Rate Source Patient Position - Orthostatic VS BP Location FiO2 (%)   Temporal Monitor -- -- --      Pain Score       -- Vitals:    06/22/22 1445 06/22/22 1600   BP: 128/72 141/76   Pulse: 69 55         Visual Acuity      ED Medications  Medications   sodium chloride 0 9 % infusion (125 mL/hr Intravenous New Bag 6/22/22 1620)   magnesium sulfate 2 g/50 mL IVPB (premix) 2 g (2 g Intravenous New Bag 6/22/22 1620)   ketorolac (TORADOL) injection 15 mg (15 mg Intravenous Given 6/22/22 1612)   metoclopramide (REGLAN) injection 10 mg (10 mg Intravenous Given 6/22/22 1610)   diphenhydrAMINE (BENADRYL) injection 25 mg (25 mg Intravenous Given 6/22/22 1608)       Diagnostic Studies  Results Reviewed     Procedure Component Value Units Date/Time    Basic metabolic panel [[id number]] Collected: 06/22/22 1557    Lab Status: Final result Specimen: Blood from Arm, Left Updated: 06/22/22 1634     Sodium 138 mmol/L      Potassium 4 6 mmol/L      Chloride 103 mmol/L      CO2 27 mmol/L      ANION GAP 8 mmol/L      BUN 21 mg/dL      Creatinine 0 86 mg/dL      Glucose 88 mg/dL      Calcium 9 3 mg/dL      eGFR 86 ml/min/1 73sq m     Narrative:      [place] guidelines for Chronic Kidney Disease (CKD):     Stage 1 with normal or high GFR (GFR > 90 mL/min/1 73 square meters)    Stage 2 Mild CKD (GFR = 60-89 mL/min/1 73 square meters)    Stage 3A Moderate CKD (GFR = 45-59 mL/min/1 73 square meters)    Stage 3B Moderate CKD (GFR = 30-44 mL/min/1 73 square meters)    Stage 4 Severe CKD (GFR = 15-29 mL/min/1 73 square meters)    Stage 5 End Stage CKD (GFR <15 mL/min/1 73 square meters)  Note: GFR calculation is accurate only with a steady state creatinine    CBC and differential [[id number]]  (Abnormal) Collected: 06/22/22 1557    Lab Status: Final result Specimen: Blood from Arm, Left Updated: 06/22/22 1604     WBC 9 96 Thousand/uL      RBC 4 93 Million/uL      Hemoglobin 14 1 g/dL      Hematocrit 44 0 %      MCV 89 fL      MCH 28 6 pg      MCHC 32 0 g/dL      RDW 13 2 %      MPV 9 6 fL      Platelets 807 Thousands/uL nRBC 0 /100 WBCs      Neutrophils Relative 57 %      Immat GRANS % 0 %      Lymphocytes Relative 32 %      Monocytes Relative 7 %      Eosinophils Relative 3 %      Basophils Relative 1 %      Neutrophils Absolute 5 60 Thousands/µL      Immature Grans Absolute 0 04 Thousand/uL      Lymphocytes Absolute 3 14 Thousands/µL      Monocytes Absolute 0 73 Thousand/µL      Eosinophils Absolute 0 33 Thousand/µL      Basophils Absolute 0 12 Thousands/µL                  No orders to display              Procedures  Procedures         ED Course                                             MDM    Disposition  Final diagnoses:   Headache     Time reflects when diagnosis was documented in both MDM as applicable and the Disposition within this note     Time User Action Codes Description Comment    6/22/2022  4:47 PM Aditi VILLALTA Add [R51 9] Headache       ED Disposition     ED Disposition   Discharge    Condition   Stable    Date/Time   Wed Jun 22, 2022  4:47 PM    Comment   Alan Alves discharge to home/self care  Follow-up Information     Follow up With Specialties Details Why Contact Info    Isai Alberts PA-C Family Medicine, Internal Medicine, Physician Assistant Schedule an appointment as soon as possible for a visit   [address]            Current Discharge Medication List      CONTINUE these medications which have NOT CHANGED    Details   buPROPion (WELLBUTRIN XL) 300 mg 24 hr tablet Take 300 mg by mouth daily       celecoxib (CeleBREX) 200 mg capsule TAKE 1 CAPSULE BY MOUTH TWICE A DAY  Qty: 60 capsule, Refills: 4    Comments: DX Code Needed      Associated Diagnoses: Chronic migraine without aura without status migrainosus, not intractable      Cholecalciferol (VITAMIN D3) 5000 units CAPS Take 5,000 Units by mouth daily      clomiPRAMINE (ANAFRANIL) 50 mg capsule Take by mouth TAKE 2 CAPSULES IN AM AND 3 CAPSULES IN PM       divalproex sodium (DEPAKOTE ER) 500 mg 24 hr tablet 1 tab HS X 3-5  Qty: 5 tablet, Refills: 0    Associated Diagnoses: Chronic migraine without aura without status migrainosus, not intractable      Emgality 120 MG/ML SOAJ INJECT 120 MG UNDER THE SKIN EVERY 30 DAYS  Qty: 1 mL, Refills: 5    Associated Diagnoses: Chronic migraine without aura without status migrainosus, not intractable      furosemide (LASIX) 40 mg tablet Take 2 tabs daily for 3 days then reduce back to 1 5 tabs daily  Qty: 120 tablet, Refills: 1    Associated Diagnoses: Idiopathic intracranial hypertension      gabapentin (NEURONTIN) 600 MG tablet TAKE 1/2 TABLET DAILY IN AM AND 1 AND 1/2 TABLET AT BEDTIME      hydrocortisone (ANUSOL-HC) 2 5 % rectal cream Apply topically 2 (two) times a day  Qty: 28 g, Refills: 2    Associated Diagnoses: Rectal bleeding      hydrocortisone (ANUSOL-HC) 25 mg suppository Insert 1 suppository (25 mg total) into the rectum daily at bedtime  Qty: 12 suppository, Refills: 0    Associated Diagnoses: Rectal bleeding      hydrocortisone-acetic acid (VOSOL-HC) otic solution Administer 3 drops into both ears every 6 (six) hours  Qty: 10 mL, Refills: 0    Associated Diagnoses: Dermatitis      levothyroxine 125 mcg tablet TAKE 1 TABLET BY MOUTH EVERY MORNING  Qty: 90 tablet, Refills: 0    Associated Diagnoses: Hypothyroidism, unspecified type      Linzess 290 MCG CAPS TAKE 1 CAPSULE BY MOUTH EVERY DAY  Qty: 30 capsule, Refills: 5    Associated Diagnoses: Chronic constipation      loratadine (CLARITIN) 10 mg tablet Take 10 mg by mouth daily      !! LORazepam (ATIVAN) 0 5 mg tablet       !!  LORazepam (ATIVAN) 1 mg tablet Take 1 mg by mouth 3 (three) times a day as needed for anxiety       MAGNESIUM PO Take 1 tablet by mouth 2 (two) times a day       metFORMIN (GLUCOPHAGE) 500 mg tablet TAKE 1 TABLET BY MOUTH EVERY DAY WITH BREAKFAST  Qty: 90 tablet, Refills: 0    Associated Diagnoses: Binge-eating disorder, severe; IFG (impaired fasting glucose); Morbid (severe) obesity due to excess calories ([address] ); Abnormal weight gain      Multiple Vitamin (MULTIVITAMIN) capsule Take 1 capsule by mouth daily      Nerve Stimulator (Cefaly Kit) JEFFREY Disp 1 device with electrodes, use as directed for 20 minutes daily for migraine prevention and as needed to abort migraine  Qty: 1 each, Refills: 0    Associated Diagnoses: Chronic migraine without aura without status migrainosus, not intractable      Nurtec 75 MG TBDP TAKE 75 MG BY MOUTH EVERY OTHER DAY   IF THERE IS A MIGRAINE ON A NON-NURTEC DAY, CAN TAKE A DOSE AND SKIP THE FOLLOWING DAY  Qty: 16 tablet, Refills: 7    Associated Diagnoses: Chronic migraine without aura without status migrainosus, not intractable      omeprazole (PriLOSEC) 20 mg delayed release capsule TAKE 1 CAPSULE BY MOUTH TWICE A DAY  Qty: 180 capsule, Refills: 1    Associated Diagnoses: Gastroesophageal reflux disease without esophagitis      potassium chloride (K-DUR,KLOR-CON) 10 mEq tablet Take 1 tablet (10 mEq total) by mouth daily  Qty: 90 tablet, Refills: 3    Associated Diagnoses: Hypervolemia, unspecified hypervolemia type; Urgency incontinence      propranolol (INDERAL) 10 mg tablet Take 10 mg by mouth 2 (two) times a day        RESTASIS 0 05 % ophthalmic emulsion PLACE 1 DROP INTO BOTH EYES TWICE A DAY  Refills: 3      rOPINIRole (REQUIP) 4 mg tablet Take 1 tablet (4 mg total) by mouth 2 (two) times a day  Qty: 180 tablet, Refills: 1    Associated Diagnoses: Restless leg syndrome      semaglutide, 1 mg/dose, (Ozempic, 1 MG/DOSE,) 4 MG/3ML SOPN injection pen Inject 0 75 mL (1 mg total) under the skin once a week  Qty: 3 mL, Refills: 1    Associated Diagnoses: IFG (impaired fasting glucose);  Morbid obesity (HCC)      sertraline (ZOLOFT) 100 mg tablet Take 200 mg by mouth daily  Refills: 0      spironolactone (ALDACTONE) 25 mg tablet TAKE 1 TABLET BY MOUTH EVERY DAY  Qty: 90 tablet, Refills: 1    Associated Diagnoses: Hypertension, unspecified type topiramate (TOPAMAX) 100 mg tablet TAKE 1 TABLET BY MOUTH EVERY DAY  Qty: 90 tablet, Refills: 3    Associated Diagnoses: Intractable chronic migraine without aura and without status migrainosus      Vraylar 6 MG capsule Take 6 mg by mouth daily daily      Vyvanse 50 MG capsule Take 50 mg by mouth daily       ! ! - Potential duplicate medications found  Please discuss with provider  No discharge procedures on file      PDMP Review     None          ED Provider  Electronically Signed by           Tova Blum DO  06/22/22 8214

## 2022-06-22 NOTE — TELEPHONE ENCOUNTER
Spoke w/pt  She currently reports headache  Pain in front of head above eyes  Describes pain as throbbing  Rates pain as a 8/0-10 scale  Admits to intermittent blurry vision in right eye  Admits to nausea/photophobia/sensitivity to odors  Denies sensitivity to sound  Patient is agreeable to outpatient infusions  She would like to go to the [facility] if possible  [employer]  [phone] to leave detailed msg

## 2022-06-24 NOTE — TELEPHONE ENCOUNTER
I see that Frantz went to the ED which is quite reasonable  I did enter the orders for outpatient infusion  If her headache is somewhat better but she still wants to proceed with OP infusion to help get the overall headache syndrome under improved control that is perfectly fine  Please proceed with any auth necessary for OP infusion (not sure if there is anyone else to whom I need to route this to set up the actual infusion? ? )

## 2022-06-30 NOTE — TELEPHONE ENCOUNTER
I called [facility],  for PA Ref [id number], phone # [phone]    No PA required for following codes:  [phone],  [id number], , [id number], , [id number], [id number], [id number], [id number], [id number], [id number], [id number], [id number], [id number], [id number], [id number], [id number], [id number], [id number]    Plan is medicare HMO plan/medicaid  80% GHP pays , medicaid pays 20%, also includes administration of drug  Limit is based on medical necessity as long as provided in network , no deductible, no OOP  Dr Anthony Franklin and facility 's infusion NPI [id number] are in network  Also was told to call [facility], [phone] REF # Melva Ship  b 9:32 am 6/30/2022, gave all above codes and was told no PA needed  Patient aware of above; she said she is feeling well this week; will call back to schedule if needed; she said she wouldn't be able to schedule until a few weeks from now due to other appointments; awaiting patient call back

## 2022-07-01 DIAGNOSIS — R73.01 IFG (IMPAIRED FASTING GLUCOSE): ICD-10-CM

## 2022-07-01 DIAGNOSIS — F50.81 BINGE-EATING DISORDER, SEVERE: ICD-10-CM

## 2022-07-01 DIAGNOSIS — E66.01 MORBID (SEVERE) OBESITY DUE TO EXCESS CALORIES (HCC): ICD-10-CM

## 2022-07-01 DIAGNOSIS — R63.5 ABNORMAL WEIGHT GAIN: ICD-10-CM

## 2022-07-04 DIAGNOSIS — G43.709 CHRONIC MIGRAINE WITHOUT AURA WITHOUT STATUS MIGRAINOSUS, NOT INTRACTABLE: ICD-10-CM

## 2022-07-05 RX ORDER — CELECOXIB 200 MG/1
CAPSULE ORAL
Qty: 60 CAPSULE | Refills: 4 | Status: SHIPPED | OUTPATIENT
Start: 2022-07-05

## 2022-07-06 ENCOUNTER — OFFICE VISIT (OUTPATIENT)
Dept: NEPHROLOGY | Facility: CLINIC | Age: 37
End: 2022-07-06
Payer: COMMERCIAL

## 2022-07-06 VITALS
HEART RATE: 73 BPM | WEIGHT: 293 LBS | OXYGEN SATURATION: 98 % | DIASTOLIC BLOOD PRESSURE: 74 MMHG | HEIGHT: 66 IN | SYSTOLIC BLOOD PRESSURE: 132 MMHG | BODY MASS INDEX: 47.09 KG/M2

## 2022-07-06 DIAGNOSIS — I10 HYPERTENSION, UNSPECIFIED TYPE: ICD-10-CM

## 2022-07-06 DIAGNOSIS — Z30.430 ENCOUNTER FOR INSERTION OF MIRENA IUD: ICD-10-CM

## 2022-07-06 DIAGNOSIS — G93.2 IDIOPATHIC INTRACRANIAL HYPERTENSION: ICD-10-CM

## 2022-07-06 DIAGNOSIS — E87.70 HYPERVOLEMIA, UNSPECIFIED HYPERVOLEMIA TYPE: ICD-10-CM

## 2022-07-06 DIAGNOSIS — Z51.81 ENCOUNTER FOR MEDICATION MONITORING: Primary | ICD-10-CM

## 2022-07-06 PROCEDURE — 3078F DIAST BP <80 MM HG: CPT | Performed by: PHYSICIAN ASSISTANT

## 2022-07-06 PROCEDURE — 3075F SYST BP GE 130 - 139MM HG: CPT | Performed by: PHYSICIAN ASSISTANT

## 2022-07-06 PROCEDURE — 99214 OFFICE O/P EST MOD 30 MIN: CPT | Performed by: PHYSICIAN ASSISTANT

## 2022-07-06 PROCEDURE — 0503F POSTPARTUM CARE VISIT: CPT | Performed by: PHYSICIAN ASSISTANT

## 2022-07-06 RX ORDER — SPIRONOLACTONE 50 MG/1
50 TABLET, FILM COATED ORAL DAILY
Qty: 30 TABLET | Refills: 0 | Status: SHIPPED | OUTPATIENT
Start: 2022-07-06 | End: 2022-08-02

## 2022-07-06 RX ORDER — FUROSEMIDE 40 MG/1
20 TABLET ORAL DAILY
Qty: 30 TABLET | Refills: 0 | Status: SHIPPED | OUTPATIENT
Start: 2022-07-06 | End: 2022-10-25

## 2022-07-06 NOTE — ASSESSMENT & PLAN NOTE
Should discuss with gynecology if [facility] a is a good choice given her diagnosis of pseudotumor cerebri

## 2022-07-06 NOTE — PROGRESS NOTES
Assessment & Plan:    1  Encounter for medication monitoring  Assessment & Plan:  BMP 1-2 weeks after increasing spironolactone dose  Stop potassium supplementation now  Orders:  -     Basic metabolic panel; Future; Expected date: 07/20/2022    2  Hypertension, unspecified type  -     spironolactone (ALDACTONE) 50 mg tablet; Take 1 tablet (50 mg total) by mouth daily    3  Idiopathic intracranial hypertension  -     furosemide (LASIX) 40 mg tablet; Take 0 5 tablets (20 mg total) by mouth daily Half a tablet daily or 1 5 tablets if a weight gain of 3 pounds in one day  4  Encounter for insertion of mirena IUD  Assessment & Plan:  Should discuss with gynecology if Mirena a is a good choice given her diagnosis of pseudotumor cerebri      5  Hypervolemia, unspecified hypervolemia type  Assessment & Plan: Will trial dose increase the spironolactone from 25 mg daily to 50 mg daily  Continue Lasix 20 mg daily and dose increased to 60 if weight is increased 3 lb  Low-sodium diet  The benefits, risks and alternatives to the treatment plan were discussed at this visit  Patient was advised of common adverse effects of any medical therapies prescribed  All questions were answered and discussed with the patient and any accompanying family members or caretakers  Subjective:      Patient ID: Su Bueno is a 39 y o  female with BMI 60 volume overload followed by Dr Anila Jones seen in the [facility] office  HPI     Today, patient presents for routine follow-up  Since her last visit, she has had a few trips to the emergency department, one on 06/01/2022 and another on 06/22/2022 for migraines  She has been followed by Neurology  She has ICP  She was on Diamox previously but stopped due to dry mouth  She has been on Lasix 20 mg daily or 60 mg if weight is up 3 lb  She is also on spironolactone 25 mg  Blood pressure is 132/74 with a heart rate of 73       We discussed reviewed the results of labs performed 06/22/2022 which revealed that renal function is excellent with a creatinine of 0 86 mg/dL estimated GFR of 86 mL/min  History is obtained from patient  The following portions of the patient's history were reviewed and updated as appropriate: allergies, current medications, past family history, past medical history, past social history, past surgical history, and problem list     Review of Systems   Constitutional: Negative for activity change, appetite change, chills, fatigue, fever and unexpected weight change  Respiratory: Negative for apnea, cough and shortness of breath  Cardiovascular: Negative for chest pain, palpitations and leg swelling  Gastrointestinal: Negative for abdominal pain, blood in stool, constipation, diarrhea, nausea and vomiting  Genitourinary: Negative for decreased urine volume, difficulty urinating, dysuria, flank pain, frequency, hematuria and urgency  Musculoskeletal: Negative for arthralgias, back pain, joint swelling and myalgias  Skin: Negative for color change and rash  Neurological: Positive for headaches  Negative for dizziness, seizures, syncope, weakness, light-headedness and numbness  Hematological: Negative for adenopathy  Does not bruise/bleed easily  Psychiatric/Behavioral: Negative for agitation, behavioral problems, confusion, decreased concentration, dysphoric mood and hallucinations  The patient is not nervous/anxious and is not hyperactive  All other systems reviewed and are negative  Objective:      /74   Pulse 73   Ht 5' 6" (1 676 m)   Wt (!) 176 kg (388 lb)   SpO2 98%   BMI 62 62 kg/m²          Physical Exam  Vitals and nursing note reviewed  Exam conducted with a chaperone present  Constitutional:       General: She is not in acute distress  Appearance: Normal appearance  She is morbidly obese  She is not ill-appearing or toxic-appearing  HENT:      Head: Normocephalic and atraumatic        Nose: Nose normal  No congestion or rhinorrhea  Mouth/Throat:      Mouth: Mucous membranes are moist       Pharynx: Oropharynx is clear  Eyes:      General: No scleral icterus  Right eye: No discharge  Left eye: No discharge  Extraocular Movements: Extraocular movements intact  Conjunctiva/sclera: Conjunctivae normal       Pupils: Pupils are equal, round, and reactive to light  Cardiovascular:      Rate and Rhythm: Normal rate and regular rhythm  Pulses: Normal pulses  Heart sounds: Normal heart sounds  No murmur heard  Pulmonary:      Effort: Pulmonary effort is normal  No respiratory distress  Breath sounds: Normal breath sounds  No wheezing  Abdominal:      General: Abdomen is flat  Bowel sounds are normal  There is no distension  Palpations: Abdomen is soft  There is no mass  Tenderness: There is no abdominal tenderness  Musculoskeletal:         General: No swelling or deformity  Normal range of motion  Cervical back: Normal range of motion and neck supple  No rigidity or tenderness  Skin:     General: Skin is warm and dry  Coloration: Skin is not jaundiced or pale  Findings: No bruising  Neurological:      General: No focal deficit present  Mental Status: She is alert and oriented to person, place, and time  Mental status is at baseline  Psychiatric:         Mood and Affect: Mood normal          Behavior: Behavior normal          Thought Content:  Thought content normal          Judgment: Judgment normal              Lab Results   Component Value Date     10/20/2015    SODIUM 138 06/22/2022    K 4 6 06/22/2022     06/22/2022    CO2 27 06/22/2022    ANIONGAP 10 10/20/2015    AGAP 8 06/22/2022    BUN 21 06/22/2022    CREATININE 0 86 06/22/2022    GLUC 88 06/22/2022    GLUF 88 12/04/2021    CALCIUM 9 3 06/22/2022    AST 15 06/01/2022    ALT 24 06/01/2022    ALKPHOS 96 06/01/2022    PROT 7 5 10/20/2015    TP 7 3 06/01/2022 BILITOT 0 41 10/20/2015    TBILI 0 16 (L) 06/01/2022    EGFR 86 06/22/2022      Lab Results   Component Value Date    CREATININE 0 86 06/22/2022    CREATININE 0 93 06/01/2022    CREATININE 0 88 12/04/2021    CREATININE 0 86 11/13/2021    CREATININE 0 88 06/26/2021    CREATININE 0 78 01/04/2021    CREATININE 0 76 10/13/2020    CREATININE 0 94 08/09/2020    CREATININE 1 04 05/27/2020    CREATININE 0 83 02/05/2020    CREATININE 0 71 11/26/2019    CREATININE 0 74 10/16/2019    CREATININE 0 63 01/24/2019    CREATININE 0 69 11/26/2018    CREATININE 0 69 07/07/2018      Lab Results   Component Value Date    COLORU Yellow 12/04/2021    CLARITYU Cloudy 12/04/2021    SPECGRAV 1 026 12/04/2021    PHUR 7 0 12/04/2021    LEUKOCYTESUR Small (A) 12/04/2021    NITRITE Negative 12/04/2021    PROTEIN UA Trace (A) 12/04/2021    GLUCOSEU Negative 12/04/2021    KETONESU Negative 12/04/2021    UROBILINOGEN 0 2 12/04/2021    BILIRUBINUR Negative 12/04/2021    BLOODU Negative 12/04/2021    RBCUA 4-10 (A) 12/04/2021    WBCUA 10-20 (A) 12/04/2021    EPIS Moderate (A) 12/04/2021    BACTERIA Occasional 12/04/2021      No results found for: LABPROT  No results found for: Izzy Ballard  Lab Results   Component Value Date    WBC 9 96 06/22/2022    HGB 14 1 06/22/2022    HCT 44 0 06/22/2022    MCV 89 06/22/2022     06/22/2022      Lab Results   Component Value Date    HGB 14 1 06/22/2022    HGB 13 8 06/01/2022    HGB 14 1 01/04/2021    HGB 16 2 (H) 08/09/2020    HGB 14 6 05/27/2020      Lab Results   Component Value Date    IRON 48 (L) 02/24/2015      No results found for: PTHCALCIUM, [id number], PHOSPHORUS   Lab Results   Component Value Date    CHOLESTEROL 212 (H) 06/26/2021    HDL 59 06/26/2021    LDLCALC 133 (H) 06/26/2021    TRIG 101 06/26/2021      Lab Results   Component Value Date    URICACID 8 2 (H) 11/13/2021      Lab Results   Component Value Date    HGBA1C 5 6 03/08/2022      Lab Results   Component Value Date H5EEMTN 0 90 01/09/2018    FREET4 0 72 (L) 01/09/2020      Lab Results   Component Value Date    JANE Negative 01/24/2019      Lab Results   Component Value Date    PROT 7 5 10/20/2015        Portions of the record may have been created with voice recognition software  Occasional wrong word or "sound a like" substitutions may have occurred due to the inherent limitations of voice recognition software  Read the chart carefully and recognize, using context, where substitutions have occurred  If you have any questions, please contact the dictating provider

## 2022-07-06 NOTE — ASSESSMENT & PLAN NOTE
Will trial dose increase the spironolactone from 25 mg daily to 50 mg daily  Continue Lasix 20 mg daily and dose increased to 60 if weight is increased 3 lb  Low-sodium diet

## 2022-07-13 ENCOUNTER — RA CDI HCC (OUTPATIENT)
Dept: OTHER | Facility: HOSPITAL | Age: 37
End: 2022-07-13

## 2022-07-13 NOTE — PROGRESS NOTES
[facility]  coding opportunities       Chart reviewed, no opportunity found:   [address]        Patients Insurance     Medicare Insurance: [employer]

## 2022-07-30 DIAGNOSIS — I10 HYPERTENSION, UNSPECIFIED TYPE: ICD-10-CM

## 2022-08-02 RX ORDER — SPIRONOLACTONE 50 MG/1
TABLET, FILM COATED ORAL
Qty: 90 TABLET | Refills: 1 | Status: SHIPPED | OUTPATIENT
Start: 2022-08-02

## 2022-08-22 ENCOUNTER — OFFICE VISIT (OUTPATIENT)
Dept: NEUROLOGY | Facility: CLINIC | Age: 37
End: 2022-08-22
Payer: COMMERCIAL

## 2022-08-22 VITALS
DIASTOLIC BLOOD PRESSURE: 78 MMHG | BODY MASS INDEX: 47.09 KG/M2 | HEIGHT: 66 IN | SYSTOLIC BLOOD PRESSURE: 132 MMHG | WEIGHT: 293 LBS | TEMPERATURE: 95.7 F | HEART RATE: 58 BPM

## 2022-08-22 DIAGNOSIS — G43.909 MIGRAINE HEADACHE: ICD-10-CM

## 2022-08-22 DIAGNOSIS — G44.229 CHRONIC TENSION-TYPE HEADACHE, NOT INTRACTABLE: ICD-10-CM

## 2022-08-22 DIAGNOSIS — G47.33 OSA (OBSTRUCTIVE SLEEP APNEA): ICD-10-CM

## 2022-08-22 DIAGNOSIS — G43.709 CHRONIC MIGRAINE WITHOUT AURA WITHOUT STATUS MIGRAINOSUS, NOT INTRACTABLE: Primary | ICD-10-CM

## 2022-08-22 DIAGNOSIS — G93.2 IDIOPATHIC INTRACRANIAL HYPERTENSION: ICD-10-CM

## 2022-08-22 PROCEDURE — 3075F SYST BP GE 130 - 139MM HG: CPT | Performed by: PSYCHIATRY & NEUROLOGY

## 2022-08-22 PROCEDURE — 99214 OFFICE O/P EST MOD 30 MIN: CPT | Performed by: PSYCHIATRY & NEUROLOGY

## 2022-08-22 PROCEDURE — 3078F DIAST BP <80 MM HG: CPT | Performed by: PSYCHIATRY & NEUROLOGY

## 2022-08-22 RX ORDER — SUMATRIPTAN 100 MG/1
TABLET, FILM COATED ORAL
Qty: 12 TABLET | Refills: 3 | Status: SHIPPED | OUTPATIENT
Start: 2022-08-22

## 2022-08-22 NOTE — PATIENT INSTRUCTIONS
Chronic migraine Elizabeth presents for follow-up evaluation with regard to her chronic migraines and daily persistent headaches in the background of potential idiopathic intracranial hypertension  She reports that her baseline headaches have not significantly changed although she is having fewer daily headaches than she had been  She still experiencing a couple of breakthrough migraine days every month, particularly these will start with altered smell and neck pain and blossom into a migraine reliably  That migraine will tend to last for 3 solid days prior to resolution     - for prevention we will plan to continue her current combination of Emgality, gabapentin, Topamax, Nurtec, Celebrex   - she is currently working with her primary care and other medical teams and is now on propranolol which I agree is reasonable  That can be titrated up at their discretion  Once her doses of medication are otherwise stable it would not be unreasonable to either increase her baseline Topamax or to transition to long-acting Topamax and then titrate upwards   - as abortive therapy we will begin Imitrex 100 mg tablets  She should take 1 tablet as early as possible, preferably during the neck pain/altered smell phase  The medication can be repeated once in 2 hours  The maximum is 2 tablets in any 24 hours  During that time when she is in the midst of a headache cycle I would like her to take Benadryl 25 mg at bedtime every night, the Imitrex can be repeated once the following morning if necessary, if she has not already repeated at the day before  Otherwise she should wait the full 24 hours  If she finds that approach to be ineffective after 2 cycles I would like for her to contact us right away    She should add 2 tablets of vascular strength Tylenol to the Imitrex itself  - I would like her to keep track of the migraine flare ups so that we can determine if there are specific patterns that we can use to adjust her treatment regimen  She should use an application on her phone or a calendar for that  She will come back to see either myself or 1 of the migraine advanced practice providers in 4 months time but should contact me in no more than 1 month to report on her progress

## 2022-08-22 NOTE — PROGRESS NOTES
HPI -    Since you last visit are your headaches - Remained the Same     If better or worse, please explain: NA     Are you taking your current medications as prescribed? yes     If no, why not? NA     Do you have any side effects? no     How often do you use abortive medications to treat a headache? 0 times per day  How effective are they? Patient uses her daily routine of medications     From 0-10, how severe is the pain for a typical headache for you? 7     What does your typical headache pain feel like? Dull     Where is your typical headache? Back of head and the eyes     What is your current headache frequency: 3 times per month     How long does your typical headache last? 3 day(s)     In addition to the head pain, what other symptoms do you have before or during your headaches? light sensitivity, sensitivity to strong smells, dizziness/light headedness and nausea     Do you have anything you need to discuss with the doctor during your visit? Questioning the IUD and if it can be causing the headaches  It has been removed about 1 week ago  Pt is a 45 yo F who reports to the clinic for f/u for migraines and IIH  Pt states currently her migraines occurs 2x-3 a month or every other week, states these symptoms can last over 9-10 days over a 30 day period  Pt states she has a week of no headache, but then the following week she can slowly feel the headache coming on, as she feels fatigued, some osmophobia and neck pain  Patient is currently compliant with her medications, taking Lasix 60mg daily, Nurtec 75mg every other day, Propanaol 20mg BID, Celebrex 200mg BID, Topamax 100mg, Emgality injection once a month (due for next dose 8/27)  Pt does not take any abortive medications as she feels that the nurtec does not help with the headache and she is not allowed to taken any NSAIDs  Pt recently had her Mirena IUD taken out as her nephrologist was concerned this may be affected her IIH   Pt has not any other complains at this time  PREVENTATIVE:  - magnesium, B2, vitamin-D  - Claritin  - lisinopril  - Wellbutrin, Zoloft, haloperidol, lithium, Latuda, Cogentin, Risperdal  - Topamax, gabapentin, Lamictal, diamox, cyclobenzabrine, tizanidine  - Indomethacin  - botox     Current: Emgality, Gabapentin, Topamax, Nurtec (preventive), Celebrex     ABORTIVE:  - Toradol, ibuprofen, diclofenac  - hydrocodone  - Ativan     - Nurtec      ROS -  Review of Systems   Constitutional: Negative for chills and fever  Eyes: Positive for photophobia  Negative for visual disturbance  Respiratory: Negative for cough and shortness of breath  Cardiovascular: Negative for chest pain and palpitations  Gastrointestinal: Positive for nausea  Negative for abdominal pain and vomiting  Genitourinary: Negative for dysuria and hematuria  Neurological: Positive for dizziness, light-headedness and headaches  Negative for seizures, syncope, weakness and numbness       Physcial Exam  General - Obese, prior surgical scars noted in b/l knees  Neuro Exam -   Mental Status - Awake, alert, in no distress   Cranial Nerves - PERRLA, EOMI intact, no nystagmus noted, visual fields intact, no facial or palate asymmetry, facial sensation intact, trapezius strength 5/5, no tongue deviation, hearing intact   Sensory - intact to light touch b/l in all extremities   Reflexes - 2+ in biceps, triceps, brachioradialis b/l, 2+ achilles b/l

## 2022-08-22 NOTE — PROGRESS NOTES
Patient ID: Rajinder Oglesby is a 44 y o  female  Assessment/Plan:   Patient Instructions   Chronic migraine Elizabeth presents for follow-up evaluation with regard to her chronic migraines and daily persistent headaches in the background of potential idiopathic intracranial hypertension  She reports that her baseline headaches have not significantly changed although she is having fewer daily headaches than she had been  She still experiencing a couple of breakthrough migraine days every month, particularly these will start with altered smell and neck pain and blossom into a migraine reliably  That migraine will tend to last for 3 solid days prior to resolution     - for prevention we will plan to continue her current combination of Emgality, gabapentin, Topamax, Nurtec, Celebrex   - she is currently working with her primary care and other medical teams and is now on propranolol which I agree is reasonable  That can be titrated up at their discretion  Once her doses of medication are otherwise stable it would not be unreasonable to either increase her baseline Topamax or to transition to long-acting Topamax and then titrate upwards   - as abortive therapy we will begin Imitrex 100 mg tablets  She should take 1 tablet as early as possible, preferably during the neck pain/altered smell phase  The medication can be repeated once in 2 hours  The maximum is 2 tablets in any 24 hours  During that time when she is in the midst of a headache cycle I would like her to take Benadryl 25 mg at bedtime every night, the Imitrex can be repeated once the following morning if necessary, if she has not already repeated at the day before  Otherwise she should wait the full 24 hours  If she finds that approach to be ineffective after 2 cycles I would like for her to contact us right away    She should add 2 tablets of vascular strength Tylenol to the Imitrex itself  - I would like her to keep track of the migraine flare ups so that we can determine if there are specific patterns that we can use to adjust her treatment regimen  She should use an application on her phone or a calendar for that  She will come back to see either myself or 1 of the migraine advanced practice providers in 4 months time but should contact me in no more than 1 month to report on her progress  Diagnoses and all orders for this visit:    Chronic migraine without aura without status migrainosus, not intractable  -     SUMAtriptan (IMITREX) 100 mg tablet; 1 tab as needed for migraine, can repeat once in 2 hours, max 200mg in 24 hours  Migraine headache  -     Ambulatory Referral to Neurology    Idiopathic intracranial hypertension    Chronic tension-type headache, not intractable    CAIO (obstructive sleep apnea)         Subjective:    HPI   Since you last visit are your headaches Remained the Same    If better or worse, please explain: NA    Are you taking your current medications as prescribed? yes    If no, why not? NA    Do you have any side effects? no    How often do you use abortive medications to treat a headache? 0 times per day  How effective are they? Patient uses her daily routine of medications    From 0-10, how severe is the pain for a typical headache for you? 7    What does your typical headache pain feel like? Dull    Where is your typical headache? Back of head and the eyes    What is your current headache frequency: 3 times per month    How long does your typical headache last? 3 day(s)    In addition to the head pain, what other symptoms do you have before or during your headaches? light sensitivity, sensitivity to strong smells, dizziness/light headedness and nausea    Do you have anything you need to discuss with the doctor during your visit? Questioning the IUD and if it can be causing the headaches  It has been removed about 1 week ago        Updated HPI:    No longer having daily headache, but now having a week on and week off approximately where she will start to feel poorly and then blossom into a few days of headache        Original migraine: This is pretty much all over in terms of location with foggy thinking+ p/p sensitivity and also osmophobia   + Nausea  This is throbbing in character  Prior was 5-7 days  Prior was every other week, not difficulty to consider how often but approx every 10 days and less severe and shorter  She just feels lousy with them      Daily Headache: Improved, not happening as often now  Starts in the back of the head, then the eyes hurt and then bilateral temporal   Pressure like in nature  Max 8/10  Lasts for up to an hour or two typically but can last all night or even into the next morning  Rarely photophobia/phonophobia/nausea     PREVENTATIVE:  - magnesium, B2, vitamin-D  - Claritin  - lisinopril  - Wellbutrin, Zoloft, haloperidol, lithium, Latuda, Cogentin, Risperdal  - Topamax, gabapentin, Lamictal, diamox, cyclobenzabrine, tizanidine  - Indomethacin  - botox  - tried the cephaly device and then did not think it was helping     Current: Emgality (better than botox), Gabapentin, Topamax, Nurtec (preventive), Celebrex     ABORTIVE:  - Toradol, ibuprofen, diclofenac  - hydrocodone  - Ativan     - Nurtec (not helpful)     CAIO: She has had the machine but had difficulty with anxiety associated with it  Currently pending a follow up at the sleep center for a repeat evaluation  Currently working to decrease Zoloft  Objective:    Blood pressure 132/78, pulse 58, temperature (!) 95 7 °F (35 4 °C), temperature source Temporal, height 5' 6" (1 676 m), weight (!) 179 kg (394 lb)  Physical Exam    Neurological Exam    I personally was present for and observed the exam as documented by [id number] and agree with his documentation with my edits      Physcial Exam  General - Obese, prior surgical scars noted in b/l knees  Neuro Exam -              Mental Status - Awake, alert, in no distress              Cranial Nerves - PERRLA, EOMI intact, no nystagmus noted, visual fields intact, no facial or palate asymmetry, facial sensation intact, trapezius strength 5/5, no tongue deviation, hearing intact              Sensory - intact to light touch b/l in all extremities              Reflexes - 2+ in biceps, triceps, brachioradialis b/l, 2+ achilles b/l      ROS:    Review of Systems   Constitutional: Negative  Negative for appetite change and fever  HENT: Negative  Negative for hearing loss, tinnitus, trouble swallowing and voice change  Eyes: Negative  Negative for photophobia and pain  Respiratory: Negative  Negative for shortness of breath  Cardiovascular: Negative  Negative for palpitations  Gastrointestinal: Negative  Negative for nausea and vomiting  Endocrine: Negative  Negative for cold intolerance  Genitourinary: Negative  Negative for dysuria, frequency and urgency  Musculoskeletal: Negative  Negative for myalgias and neck pain  Skin: Negative  Negative for rash  Neurological: Positive for headaches  Negative for dizziness, tremors, seizures, syncope, facial asymmetry, speech difficulty, weakness, light-headedness and numbness  Hematological: Negative  Does not bruise/bleed easily  Psychiatric/Behavioral: Negative  Negative for confusion, hallucinations and sleep disturbance  Reviewed ROS as entered by medical assistant

## 2022-09-01 ENCOUNTER — TELEPHONE (OUTPATIENT)
Dept: NEUROLOGY | Facility: CLINIC | Age: 37
End: 2022-09-01

## 2022-09-01 DIAGNOSIS — G93.2 IDIOPATHIC INTRACRANIAL HYPERTENSION: ICD-10-CM

## 2022-09-01 DIAGNOSIS — G43.709 CHRONIC MIGRAINE WITHOUT AURA WITHOUT STATUS MIGRAINOSUS, NOT INTRACTABLE: Primary | ICD-10-CM

## 2022-09-01 NOTE — TELEPHONE ENCOUNTER
Patient left voicemail giving update that she took Imitrex for a migraine  Said migraine did go away but came back; requesting call back [phone]  I returned call and reference Dr Jennifer Sharif  last office note dated August 22nd as follows:     "as abortive therapy we will begin Imitrex 100 mg tablets  She should take 1 tablet as early as possible, preferably during the neck pain/altered smell phase  The medication can be repeated once in 2 hours  The maximum is 2 tablets in any 24 hours  During that time when she is in the midst of a headache cycle I would like her to take Benadryl 25 mg at bedtime every night, the Imitrex can be repeated once the following morning if necessary, if she has not already repeated at the day before  Otherwise she should wait the full 24 hours  If she finds that approach to be ineffective after 2 cycles I would like for her to contact us right away  She should add 2 tablets of vascular strength Tylenol to the Imitrex itself"    I called patient to discuss;reached voicemail and left detailed message  On voicemail I read her recommendation as listed above in Dr Jennifer Sharif is office note  Consent on file  Requested call back with any questions concerns

## 2022-09-02 NOTE — TELEPHONE ENCOUNTER
Patient left voicemail;call back [phone]  I returned call reached voicemail  Requested call back to clarify that she also took Benadryl and extra-strength Tylenol as recommended  Awaiting call back with  clarification  Also suggested she can send message to my chart as well

## 2022-09-06 NOTE — TELEPHONE ENCOUNTER
Patient of Dr Lay Pavon, last visit 8/22  reporting recurring migraine x 2 weeks she said despite following two cycles of abortives as recommended at visit:      - as abortive therapy we will begin Imitrex 100 mg tablets  She should take 1 tablet as early as possible, preferably during the neck pain/altered smell phase  The medication can be repeated once in 2 hours  The maximum is 2 tablets in any 24 hours  During that time when she is in the midst of a headache cycle I would like her to take Benadryl 25 mg at bedtime every night, the Imitrex can be repeated once the following morning if necessary, if she has not already repeated at the day before  Otherwise she should wait the full 24 hours  If she finds that approach to be ineffective after 2 cycles I would like for her to contact us right away  Current pain, "feels horrible"  "6-7/10" however "different than usual headache  Describes as "sinus pressure" denies light/sound sensitivity, mild nausea  Current meds:  claritin  celebrex 200 mg bid  emgality injnjection  gabapentin  nurtec qod   100 mg daily topamax  gabapentin 300 mg in  in PM    Does not recall trying dexamethasone; said depakote and olanzapine not very helpful in the past     Has visit with PCP tomorrow  Please provide recommendation, thank you

## 2022-09-08 ENCOUNTER — OFFICE VISIT (OUTPATIENT)
Dept: NEPHROLOGY | Facility: CLINIC | Age: 37
End: 2022-09-08
Payer: COMMERCIAL

## 2022-09-08 VITALS
DIASTOLIC BLOOD PRESSURE: 74 MMHG | BODY MASS INDEX: 47.09 KG/M2 | OXYGEN SATURATION: 99 % | HEIGHT: 66 IN | HEART RATE: 73 BPM | SYSTOLIC BLOOD PRESSURE: 132 MMHG | WEIGHT: 293 LBS

## 2022-09-08 DIAGNOSIS — I10 HYPERTENSION, UNSPECIFIED TYPE: ICD-10-CM

## 2022-09-08 DIAGNOSIS — E26.9 HYPERALDOSTERONISM (HCC): ICD-10-CM

## 2022-09-08 DIAGNOSIS — G93.2 INTRACRANIAL HYPERTENSION: Primary | ICD-10-CM

## 2022-09-08 DIAGNOSIS — E66.01 MORBID OBESITY (HCC): ICD-10-CM

## 2022-09-08 DIAGNOSIS — E87.70 HYPERVOLEMIA, UNSPECIFIED HYPERVOLEMIA TYPE: ICD-10-CM

## 2022-09-08 DIAGNOSIS — G43.709 CHRONIC MIGRAINE WITHOUT AURA WITHOUT STATUS MIGRAINOSUS, NOT INTRACTABLE: Primary | ICD-10-CM

## 2022-09-08 PROCEDURE — 99213 OFFICE O/P EST LOW 20 MIN: CPT | Performed by: NURSE PRACTITIONER

## 2022-09-08 RX ORDER — INDOMETHACIN 50 MG/1
50 CAPSULE ORAL
Qty: 30 CAPSULE | Refills: 1 | Status: SHIPPED | OUTPATIENT
Start: 2022-09-08 | End: 2022-09-18

## 2022-09-08 RX ORDER — INDOMETHACIN 50 MG/1
50 CAPSULE ORAL
Qty: 30 CAPSULE | Refills: 1 | Status: SHIPPED | OUTPATIENT
Start: 2022-09-08 | End: 2022-09-08

## 2022-09-08 RX ORDER — SUCRALFATE 1 G/1
TABLET ORAL
Qty: 90 TABLET | Refills: 0 | Status: SHIPPED | OUTPATIENT
Start: 2022-09-08

## 2022-09-08 RX ORDER — SUCRALFATE 1 G/1
TABLET ORAL
Qty: 90 TABLET | Refills: 0 | Status: SHIPPED | OUTPATIENT
Start: 2022-09-08 | End: 2022-09-08

## 2022-09-08 NOTE — PROGRESS NOTES
Nephrology   Office Follow-Up  Mo Ragland 44 y o  female MRN: [id number]    Encounter: [id number]        [address] was seen in the [address] [place] [address] office today  All diagnoses and orders for visit:     1  Intracranial hypertension  · Did not tolerate diamox due to dry mouth  Being managed with lasix however admits symptoms are no better with this regimen and she still gets headaches  · Will check lab work now to ensure kidney function and electrolyte stability  · She prefers not to weigh herself as she admits to body dysmorphia  · Her IUD was removed to hopefully help with symptoms  · Another option is methazolamide  · Weight loss advised  2  Hypertension, unspecified type  · Blood pressure is appropriate, no changes made  Goal <130/70 mmHg  3  Hypervolemia, unspecified hypervolemia type  · She examines euvolemic  As always, reminded to continue low sodium diet    -     Comprehensive metabolic panel; Future   -     Phosphorus; Future   -     Magnesium; Future  4  Hyperaldosteronism (HCC)  · Potential hyperaldosteronism  Aldosterone level in 2021 was elevated, no renin/aldosterone ratio to evaluate  She was placed on spironolactone and blood pressures are appropriate  Could consider holding spironolactone for a period of time and checking ratio  Monitor for hormonal side effects  5  Morbid obesity ([facility] [address] )  · She will see weight management     HPI: Mo Ragland is a 44 y o  female  With chronic migraines, intracranial hypertension, morbid obesity, CAIO, hypertension, IFG  She follows with nephrology for volume management  She is on diuretic to decrease CSF production  She was previously on Diamox but did not tolerate due to dry mouth  She is now on lasix  ROS:   Review of Systems   Constitutional: Negative for chills and fever  HENT: Negative for ear pain and sore throat  Eyes: Negative for pain and visual disturbance     Respiratory: Negative for cough and shortness of breath  Cardiovascular: Negative for chest pain and palpitations  Gastrointestinal: Negative for abdominal pain and vomiting  Genitourinary: Negative for dysuria and hematuria  Musculoskeletal: Negative for arthralgias and back pain  Skin: Negative for color change and rash  Neurological: Positive for headaches  Negative for seizures and syncope  All other systems reviewed and are negative        Allergies: Doxycycline and Other    Medications:   Current Outpatient Medications:     buPROPion (WELLBUTRIN XL) 300 mg 24 hr tablet, Take 300 mg by mouth daily , Disp: , Rfl:     celecoxib (CeleBREX) 200 mg capsule, TAKE 1 CAPSULE BY MOUTH TWICE A DAY, Disp: 60 capsule, Rfl: 4    Cholecalciferol (VITAMIN D3) 5000 units CAPS, Take 5,000 Units by mouth daily, Disp: , Rfl:     clomiPRAMINE (ANAFRANIL) 50 mg capsule, Take by mouth TAKE 2 CAPSULES IN AM AND 3 CAPSULES IN PM  (Patient not taking: Reported on 9/15/2022), Disp: , Rfl:     Emgality 120 MG/ML SOAJ, INJECT 120 MG UNDER THE SKIN EVERY 30 DAYS , Disp: 1 mL, Rfl: 5    gabapentin (NEURONTIN) 600 MG tablet, TAKE 1/2 TABLET DAILY IN AM AND 1 AND 1/2 TABLET AT BEDTIME, Disp: , Rfl:     hydrocortisone-acetic acid (VOSOL-HC) otic solution, Administer 3 drops into both ears every 6 (six) hours, Disp: 10 mL, Rfl: 0    indomethacin (INDOCIN) 50 mg capsule, Take 1 capsule (50 mg total) by mouth 3 (three) times a day with meals for 10 days (Patient not taking: Reported on 9/15/2022), Disp: 30 capsule, Rfl: 1    Linzess 290 MCG CAPS, TAKE 1 CAPSULE BY MOUTH EVERY DAY, Disp: 30 capsule, Rfl: 5    loratadine (CLARITIN) 10 mg tablet, Take 10 mg by mouth daily, Disp: , Rfl:     LORazepam (ATIVAN) 0 5 mg tablet, , Disp: , Rfl:     LORazepam (ATIVAN) 1 mg tablet, Take 1 mg by mouth 3 (three) times a day as needed for anxiety (Patient not taking: Reported on 9/15/2022), Disp: , Rfl:     MAGNESIUM PO, Take 1 tablet by mouth 2 (two) times a day  (Patient not taking: Reported on 9/15/2022), Disp: , Rfl:     metFORMIN (GLUCOPHAGE) 500 mg tablet, TAKE 1 TABLET BY MOUTH EVERY DAY WITH BREAKFAST, Disp: 90 tablet, Rfl: 0    Multiple Vitamin (MULTIVITAMIN) capsule, Take 1 capsule by mouth daily, Disp: , Rfl:     Nurtec 75 MG TBDP, TAKE 75 MG BY MOUTH EVERY OTHER DAY   IF THERE IS A MIGRAINE ON A NON-NURTEC DAY, CAN TAKE A DOSE AND SKIP THE FOLLOWING DAY, Disp: 16 tablet, Rfl: 7    omeprazole (PriLOSEC) 20 mg delayed release capsule, TAKE 1 CAPSULE BY MOUTH TWICE A DAY, Disp: 180 capsule, Rfl: 1    propranolol (INDERAL) 10 mg tablet, Take 10 mg by mouth 2 (two) times a day   (Patient not taking: Reported on 9/15/2022), Disp: , Rfl:     sertraline (ZOLOFT) 100 mg tablet, Take 100 mg by mouth daily , Disp: , Rfl: 0    spironolactone (ALDACTONE) 50 mg tablet, TAKE 1 TABLET BY MOUTH EVERY DAY, Disp: 90 tablet, Rfl: 1    sucralfate (CARAFATE) 1 g tablet, 1 cap TID if needed 30-60 minutes before Indocin (Patient not taking: Reported on 9/15/2022), Disp: 90 tablet, Rfl: 0    SUMAtriptan (IMITREX) 100 mg tablet, 1 tab as needed for migraine, can repeat once in 2 hours, max 200mg in 24 hours  , Disp: 12 tablet, Rfl: 3    topiramate (TOPAMAX) 100 mg tablet, TAKE 1 TABLET BY MOUTH EVERY DAY, Disp: 90 tablet, Rfl: 3    Vraylar 6 MG capsule, Take 6 mg by mouth daily daily, Disp: , Rfl:     Vyvanse 50 MG capsule, Take 50 mg by mouth daily, Disp: , Rfl:     furosemide (LASIX) 10 mg/mL oral solution, Take 60 mg by mouth daily, Disp: , Rfl:     furosemide (LASIX) 40 mg tablet, Take 0 5 tablets (20 mg total) by mouth daily Half a tablet daily or 1 5 tablets if a weight gain of 3 pounds in one day , Disp: 30 tablet, Rfl: 0    levothyroxine 125 mcg tablet, TAKE 1 TABLET BY MOUTH EVERY DAY IN THE MORNING, Disp: 30 tablet, Rfl: 0    rOPINIRole (REQUIP) 4 mg tablet, TAKE 1 TABLET BY MOUTH 2 TIMES A DAY , Disp: 180 tablet, Rfl: 1    semaglutide, 1 mg/dose, (Ozempic, 1 MG/DOSE,) 4 MG/3ML SOPN injection pen, Inject 0 75 mL (1 mg total) under the skin once a week, Disp: 3 mL, Rfl: 1    Current Facility-Administered Medications:     Botulinum Toxin Type A SOLR 200 Units, 200 Units, Injection, Q3 Months, Arlet Doyle PA-C, 200 Units at 04/01/21 1453    Botulinum Toxin Type A SOLR 200 Units, 200 Units, Injection, Q3 Months, Mitchel Prince MD, 200 Units at 07/28/21 1211    Past Medical History:   Diagnosis Date    Anorexia nervosa in remission     Anxiety     Back pain     Bipolar disorder (HCC)     Depression     Esophageal reflux 8/15/2013    Hypertension     Hypothyroid     Idiopathic intracranial hypertension     Lumbar degenerative disc disease 5/8/2014    Migraine     Obesity     Obsessive-compulsive disorder     Overactive bladder     Psychiatric disorder     Restless leg syndrome, controlled     Seasonal allergies     Suicide and self-inflicted injury ([facility] [address] )     Transcranial Magnetic Stimulation 09/06/2021     Past Surgical History:   Procedure Laterality Date    DENTAL SURGERY      wisdom teeth-at [age] years   Other surgery dental extraxtion of bone spur (10 years ago)    IR LUMBAR PUNCTURE  02/26/2019    SINUS ENDOSCOPY      TONSILLECTOMY       Family History   Problem Relation Age of Onset    Mental illness Mother     Depression Mother     Suicide Attempts Mother     Hypertension Mother     Diabetes Mother     Other Mother         bicuspid aortic valve    Hypertension Father     Hypothyroidism Father     Hypertension Brother     Cancer Maternal Grandmother     Heart disease Maternal Grandmother     Stroke Maternal Grandmother     Breast cancer Maternal Grandmother     Skin cancer Maternal Grandmother     Pneumonia Maternal Grandfather     Cancer Maternal Grandfather     Cancer Paternal Grandmother     Pneumonia Paternal Grandfather     Arthritis Family     Breast cancer Family     Osteopenia Family     Osteoporosis Family     Hypertension Family     Transient ischemic attack Family       reports that she has never smoked  She has never used smokeless tobacco  She reports previous alcohol use  She reports that she does not use drugs  Physical Exam:   Vitals:    09/08/22 1432   BP: 132/74   Pulse: 73   SpO2: 99%   Weight: (!) 177 kg (389 lb 3 2 oz)   Height: 5' 6" (1 676 m)     Body mass index is 62 82 kg/m²  General: conscious, cooperative, in no acute distress, appears stated age  Eyes: conjunctivae pale, anicteric sclerae  ENT: lips and mucous membranes moist  Neck: supple, no JVD, no masses  Chest:  essentially clear breath sounds bilaterally, no crackles, ronchus or wheezings  CVS: S1 & S2, normal rate, regular rhythm  Abdomen: soft, non-tender, non-distended, normoactive bowel sounds, rounded obese  Extremities: no edema of both legs  Skin: no rash   Neuro: awake, alert, oriented       Diagnostic Data:  Lab: I have personally reviewed pertinent lab results  ,   CBC:       CMP: No results found for: SODIUM, K, CL, CO2, ANIONGAP, BUN, CREATININE, GLUCOSE, CALCIUM, AST, ALT, ALKPHOS, PROT, BILITOT, EGFR,   PT/INR: No results found for: PT, INR,   Magnesium: No components found for: MAG,  Phosphorous: No results found for: PHOS    Patient Instructions   Get blood work done at your earliest convenience       Portions of the record may have been created with voice recognition software  Occasional wrong word or "sound a like" substitutions may have occurred due to the inherent limitations of voice recognition software  Read the chart carefully and recognize, using context, where substitutions have occurred  If you have any questions, please contact the dictating provider

## 2022-09-08 NOTE — TELEPHONE ENCOUNTER
Please check in with Elizabeth    Did they have a concern for sinusitis at her PCP visit and did they prescribe treatment? If so she should complete that and NOT start the Indocin below  I they did not find anything at her PCP visit and if she is still feeling unwell I suggest an Indocin bridge  It is important that she stay well hydrated while on this to protect her kidneys  While taking this medication she should NOT take her Celebrex    this would take the place of Celebrex but only for 7-10 days  She can take it with food, but in case it bothers her stomach I am also sending over to the pharmacy Carafate which she can take before hand    she only needs that if it bothers her stomach  She would stay on this for 7-10 days and let us know in about a week how she is doing  Mukul Prado

## 2022-09-08 NOTE — TELEPHONE ENCOUNTER
Patient aware of recommendation however feels Celebrex is more effective than Indocin and prefers to stay on celebrex given the choice;  she had to reschedule her primary care visit to tomorrow at 3:30 p m  Raysa Saini She will advise on Monday outcome of visit ; reporting that her headache feels better today

## 2022-09-09 ENCOUNTER — OFFICE VISIT (OUTPATIENT)
Dept: FAMILY MEDICINE CLINIC | Facility: CLINIC | Age: 37
End: 2022-09-09
Payer: COMMERCIAL

## 2022-09-09 VITALS
BODY MASS INDEX: 47.09 KG/M2 | WEIGHT: 293 LBS | DIASTOLIC BLOOD PRESSURE: 76 MMHG | TEMPERATURE: 97.4 F | HEART RATE: 71 BPM | SYSTOLIC BLOOD PRESSURE: 142 MMHG | RESPIRATION RATE: 14 BRPM | OXYGEN SATURATION: 98 % | HEIGHT: 66 IN

## 2022-09-09 DIAGNOSIS — G43.001 MIGRAINE WITHOUT AURA AND WITH STATUS MIGRAINOSUS, NOT INTRACTABLE: Primary | ICD-10-CM

## 2022-09-09 DIAGNOSIS — E03.9 HYPOTHYROIDISM, UNSPECIFIED TYPE: ICD-10-CM

## 2022-09-09 DIAGNOSIS — R63.2 BINGE EATING: ICD-10-CM

## 2022-09-09 PROBLEM — D22.9 MULTIPLE NEVI: Status: RESOLVED | Noted: 2018-09-06 | Resolved: 2022-09-09

## 2022-09-09 PROBLEM — Z01.411 ENCNTR FOR GYN EXAM (GENERAL) (ROUTINE) W ABNORMAL FINDINGS: Status: RESOLVED | Noted: 2018-02-26 | Resolved: 2022-09-09

## 2022-09-09 PROBLEM — Z01.419 ENCNTR FOR GYN EXAM (GENERAL) (ROUTINE) W/O ABN FINDINGS: Status: RESOLVED | Noted: 2020-03-06 | Resolved: 2022-09-09

## 2022-09-09 PROBLEM — N63.0 BREAST NODULE: Status: RESOLVED | Noted: 2018-12-04 | Resolved: 2022-09-09

## 2022-09-09 PROCEDURE — 3077F SYST BP >= 140 MM HG: CPT | Performed by: FAMILY MEDICINE

## 2022-09-09 PROCEDURE — 99203 OFFICE O/P NEW LOW 30 MIN: CPT | Performed by: FAMILY MEDICINE

## 2022-09-09 PROCEDURE — 3078F DIAST BP <80 MM HG: CPT | Performed by: FAMILY MEDICINE

## 2022-09-09 RX ORDER — FUROSEMIDE 10 MG/ML
60 SOLUTION ORAL DAILY
COMMUNITY

## 2022-09-09 NOTE — PATIENT INSTRUCTIONS
Migraine Headache   WHAT YOU NEED TO KNOW:   A migraine is a severe headache  The pain can be so severe that it interferes with your daily activities  A migraine can last a few hours up to several days  The exact cause of migraines is not known  DISCHARGE INSTRUCTIONS:   Call your local emergency number (911 in the [address] [facility]) or have someone call if:   You feel like you are going to faint, you become confused, or you have a seizure  Return to the emergency department if:   You have a headache that seems different or much worse than your usual migraine headache  You have a severe headache with a fever or a stiff neck  You have new problems with speech, vision, balance, or movement  Call your doctor or neurologist if:   Your migraines interfere with your daily activities  Your medicines or treatments stop working  You have questions or concerns about your condition or care  Medicines:  Some medicines may only be given while you are in the emergency department  You may also need medicines later to manage migraines or other health problems they can cause  Take medicine as soon as you feel a migraine begin, or as directed  Migraine medicines  are used to help prevent or stop a migraine  NSAIDs  help decrease swelling and pain or fever  This medicine is available with or without a doctor's order  NSAIDs can cause stomach bleeding or kidney problems in certain people  If you take blood thinner medicine, always ask your healthcare provider if NSAIDs are safe for you  Always read the medicine label and follow directions  Acetaminophen  decreases pain and fever  It is available without a doctor's order  Ask how much to take and how often to take it  Follow directions  Read the labels of all other medicines you are using to see if they also contain acetaminophen, or ask your doctor or pharmacist  Acetaminophen can cause liver damage if not taken correctly   Do not use more than 4 grams (4,000 milligrams) total of acetaminophen in one day  Prescription pain medicine  may be given  Ask your healthcare provider how to take this medicine safely  Some prescription pain medicines contain acetaminophen  Do not take other medicines that contain acetaminophen without talking to your healthcare provider  Too much acetaminophen may cause liver damage  Prescription pain medicine may cause constipation  Ask your healthcare provider how to prevent or treat constipation  Antinausea medicine  may be given to calm your stomach and to help prevent vomiting  This medicine can also help relieve pain  Steroids  may be given to prevent a migraine from coming back right away  Take your medicine as directed  Contact your healthcare provider if you think your medicine is not helping or if you have side effects  Tell him or her if you are allergic to any medicine  Keep a list of the medicines, vitamins, and herbs you take  Include the amounts, and when and why you take them  Bring the list or the pill bottles to follow-up visits  Carry your medicine list with you in case of an emergency  Manage your symptoms:   Rest in a dark, quiet room  This will help decrease your pain  Sleep may also help relieve the pain  Apply ice to decrease pain  Use an ice pack, or put crushed ice in a plastic bag  Cover the ice pack with a towel and place it on your head  Apply ice for 15 to 20 minutes every hour  Apply heat to decrease pain and muscle spasms  Use a small towel dampened with warm water or a heating pad, or sit in a warm bath  Apply heat on the area for 20 to 30 minutes every 2 hours  You may alternate heat and ice  Keep a migraine record  Write down when your migraines start and stop  Include your symptoms and what you were doing when a migraine began  Record what you ate or drank for 24 hours before the migraine started  Keep track of what you did to treat your migraine and if it worked   Bring the migraine record with you to visits with your healthcare provider  Common triggers for a migraine include the following:   Stress, eye strain, oversleeping, or not getting enough sleep    Hormone changes in women from birth control pills, pregnancy, menopause, or during a monthly period    Skipping meals, going too long without eating, or not drinking enough liquids    Certain foods or drinks such as chocolate, hard cheese, alcohol, or drinks that contain caffeine    Foods that contain gluten, nitrates, MSG, or artificial sweeteners    Sunlight, bright or flashing lights, loud noises, smoke, or strong smells    Heat, humidity, or changes in the weather    Prevent another migraine:   Prevent a medicine overuse headache  Take pain medicines only as long as directed  A medicine may be limited to a certain amount each month  Your healthcare provider can help you create a plan so you get a safe amount each month  Do not smoke  Nicotine and other chemicals in cigarettes and cigars can trigger a migraine or make it worse  Ask your healthcare provider for information if you currently smoke and need help to quit  E-cigarettes or smokeless tobacco still contain nicotine  Talk to your healthcare provider before you use these products  Do not drink alcohol  Alcohol can trigger a migraine  It can also keep medicines used to treat your migraines from working  Be physically active  Physical activity, such as exercise, may help prevent migraines  Talk to your healthcare provider about the best activity plan for you  Try to get at least 30 minutes of physical activity on most days  Manage stress  Stress may trigger a migraine  Learn new ways to relax, such as deep breathing  Create a sleep schedule  Go to bed and get up at the same times each day  Do not watch television before bed  Eat a variety of healthy foods    Include healthy foods such as fruit, vegetables, whole-grain breads, low-fat dairy products, beans, lean meat, and fish  Do not have food or drinks that trigger your migraines  Drink more liquids to prevent dehydration  Your healthcare provider can tell you how much liquid to drink each day and which liquids are best for you  Follow up with your doctor or neurologist as directed:  Bring your migraine record with you  Write down your questions so you remember to ask them during your visits  © Copyright [employer] 2022 Information is for End User's use only and may not be sold, redistributed or otherwise used for commercial purposes  All illustrations and images included in CareNotes® are the copyrighted property of [employer]  or [facility] Nusrat Bullock   The above information is an  only  It is not intended as medical advice for individual conditions or treatments  Talk to your doctor, nurse or pharmacist before following any medical regimen to see if it is safe and effective for you

## 2022-09-09 NOTE — PROGRESS NOTES
Assessment/Plan:    Ms Lulu Farris is a 45 yo F with pmhx of idiopathic intracranial HTN, Essential HTN, hpothyroidism, EILEEN, depression, OCD, Binge eating disorder, GERD who presents to [facility] care  Patient has recent lab work, but did order TSH,Free T4 and lipid panel as this has not been assessed x 1 year  No problem-specific Assessment & Plan notes found for this encounter  Diagnoses and all orders for this visit:    Migraine without aura and with status migrainosus, not intractable  - Overall migraine headaches have improved as frequency has decreased from lasting 5-7 days and occuring every other week, to 2-3 days occurring every other week  - Patient follows with Neurology for migraine headaches and is currently on Emgality, gabapentin, Topamax, Nurtec, Celebrex     Hypothyroidism, unspecified type  -     TSH, 3rd generation; Future  -     T4, free; Future  -     Lipid panel; Future    Binge eating  - Patient follows psychiatry and has a therapist, with whom she has weekly sessions  - Weight is relatively stable ranging 350-400lbs over the last 15 years  - She does have appointment scheduled with weight management on 9/15/22      Subjective:      Patient ID: Chad Huang is a 44 y o  female      Baseline migraines started when she was 10year old acutely worsened and at the age of 11 was ultimately diagnosed with idiopathic intracranial hypertension  Patient reports she started suffering from diplopia and underwent lumbar puncture for diagnosis  Patient now states that over the last 3 5 years  ago she has had a difficult personal relationship with partner  And has noted worsening of her migraine headaches without aura since then  Notably starting in the occipital region patient describes radiation to behind the eyes  She does endorse visual disturbances such as blurred vision but does not experience specifics blind spots  Patient reports previously headaches would last about 5-7 days every other week, however, now they are typically lasting about 2-3 days every other week  Patient states that sleeping is truly the only thing that will alleviate headaches ultimately        Binge-Eating Disorder  At the age of 23years old, patient started experiencing behaviors with eating ie she would restrict her calorie intake to <500 kcal per day  She was walking 12-13 miles each day and eventually became "skin and bones"  She was hospitalized at [facility] in the Eating Disorder unit and diagnosed with Anorexia Nervosa  Patient now she is established with a Psychiatrist and therapist for her binge-eating disorder and feels stable  She reports she has weighed about 350-400lbs for the last 15 years 400lbs    The following portions of the patient's history were reviewed and updated as appropriate: allergies, current medications, past family history, past medical history, past social history, past surgical history and problem list     Review of Systems   Constitutional: Negative for chills and fever  HENT: Negative for hearing loss  Eyes: Negative for visual disturbance  Respiratory: Negative for chest tightness and shortness of breath  Cardiovascular: Negative for chest pain and palpitations  Gastrointestinal: Negative for nausea and vomiting  Neurological: Positive for headaches  Negative for dizziness and light-headedness  Objective:      /76   Pulse 71   Temp (!) 97 4 °F (36 3 °C)   Resp 14   Ht 5' 6 44" (1 688 m)   Wt (!) 177 kg (390 lb 12 8 oz)   SpO2 98%   BMI 62 25 kg/m²          Physical Exam  Constitutional:       Appearance: She is well-developed  She is obese  HENT:      Head: Normocephalic and atraumatic  Nose: Nose normal    Eyes:      Conjunctiva/sclera: Conjunctivae normal    Cardiovascular:      Rate and Rhythm: Normal rate and regular rhythm  Heart sounds: Normal heart sounds     Pulmonary:      Effort: Pulmonary effort is normal       Breath sounds: Normal breath sounds  Abdominal:      General: Bowel sounds are normal       Palpations: Abdomen is soft  Musculoskeletal:         General: Normal range of motion  Cervical back: Normal range of motion and neck supple  Skin:     General: Skin is warm and dry  Neurological:      Mental Status: She is alert and oriented to person, place, and time     Psychiatric:         Mood and Affect: Mood normal          Behavior: Behavior normal

## 2022-09-10 DIAGNOSIS — G25.81 RESTLESS LEG SYNDROME: ICD-10-CM

## 2022-09-12 RX ORDER — ROPINIROLE 4 MG/1
TABLET, FILM COATED ORAL
Qty: 180 TABLET | Refills: 1 | Status: SHIPPED | OUTPATIENT
Start: 2022-09-12

## 2022-09-14 ENCOUNTER — LAB (OUTPATIENT)
Dept: LAB | Facility: CLINIC | Age: 37
End: 2022-09-14
Payer: COMMERCIAL

## 2022-09-14 ENCOUNTER — APPOINTMENT (OUTPATIENT)
Dept: LAB | Facility: CLINIC | Age: 37
End: 2022-09-14
Payer: COMMERCIAL

## 2022-09-14 DIAGNOSIS — Z51.81 ENCOUNTER FOR MEDICATION MONITORING: ICD-10-CM

## 2022-09-14 DIAGNOSIS — E87.70 HYPERVOLEMIA, UNSPECIFIED HYPERVOLEMIA TYPE: ICD-10-CM

## 2022-09-14 DIAGNOSIS — E03.9 HYPOTHYROIDISM, UNSPECIFIED TYPE: ICD-10-CM

## 2022-09-14 LAB
ALBUMIN SERPL BCP-MCNC: 3.1 G/DL (ref 3.5–5)
ALP SERPL-CCNC: 98 U/L (ref 46–116)
ALT SERPL W P-5'-P-CCNC: 23 U/L (ref 12–78)
ANION GAP SERPL CALCULATED.3IONS-SCNC: 7 MMOL/L (ref 4–13)
AST SERPL W P-5'-P-CCNC: 8 U/L (ref 5–45)
BILIRUB SERPL-MCNC: 0.38 MG/DL (ref 0.2–1)
BUN SERPL-MCNC: 20 MG/DL (ref 5–25)
CALCIUM ALBUM COR SERPL-MCNC: 9.4 MG/DL (ref 8.3–10.1)
CALCIUM SERPL-MCNC: 8.7 MG/DL (ref 8.3–10.1)
CHLORIDE SERPL-SCNC: 108 MMOL/L (ref 96–108)
CHOLEST SERPL-MCNC: 177 MG/DL
CO2 SERPL-SCNC: 24 MMOL/L (ref 21–32)
CREAT SERPL-MCNC: 0.81 MG/DL (ref 0.6–1.3)
GFR SERPL CREATININE-BSD FRML MDRD: 92 ML/MIN/1.73SQ M
GLUCOSE P FAST SERPL-MCNC: 98 MG/DL (ref 65–99)
HDLC SERPL-MCNC: 47 MG/DL
LDLC SERPL CALC-MCNC: 113 MG/DL (ref 0–100)
MAGNESIUM SERPL-MCNC: 2.2 MG/DL (ref 1.6–2.6)
NONHDLC SERPL-MCNC: 130 MG/DL
PHOSPHATE SERPL-MCNC: 3.2 MG/DL (ref 2.7–4.5)
POTASSIUM SERPL-SCNC: 3.9 MMOL/L (ref 3.5–5.3)
PROT SERPL-MCNC: 6.9 G/DL (ref 6.4–8.4)
SODIUM SERPL-SCNC: 139 MMOL/L (ref 135–147)
T4 FREE SERPL-MCNC: 0.87 NG/DL (ref 0.76–1.46)
TRIGL SERPL-MCNC: 86 MG/DL
TSH SERPL DL<=0.05 MIU/L-ACNC: 2.36 UIU/ML (ref 0.45–4.5)

## 2022-09-14 PROCEDURE — 84100 ASSAY OF PHOSPHORUS: CPT

## 2022-09-14 PROCEDURE — 80061 LIPID PANEL: CPT

## 2022-09-14 PROCEDURE — 83735 ASSAY OF MAGNESIUM: CPT

## 2022-09-14 PROCEDURE — 84443 ASSAY THYROID STIM HORMONE: CPT

## 2022-09-14 PROCEDURE — 80053 COMPREHEN METABOLIC PANEL: CPT

## 2022-09-14 PROCEDURE — 36415 COLL VENOUS BLD VENIPUNCTURE: CPT

## 2022-09-14 PROCEDURE — 84439 ASSAY OF FREE THYROXINE: CPT

## 2022-09-15 ENCOUNTER — TELEMEDICINE (OUTPATIENT)
Dept: BARIATRICS | Facility: CLINIC | Age: 37
End: 2022-09-15
Payer: COMMERCIAL

## 2022-09-15 VITALS — HEIGHT: 66 IN | BODY MASS INDEX: 47.09 KG/M2 | WEIGHT: 293 LBS

## 2022-09-15 DIAGNOSIS — F50.81 BINGE-EATING DISORDER, SEVERE: ICD-10-CM

## 2022-09-15 DIAGNOSIS — E66.01 MORBID OBESITY (HCC): ICD-10-CM

## 2022-09-15 DIAGNOSIS — E66.01 CLASS 3 SEVERE OBESITY WITH BODY MASS INDEX (BMI) OF 60.0 TO 69.9 IN ADULT (HCC): Primary | ICD-10-CM

## 2022-09-15 DIAGNOSIS — R73.01 IFG (IMPAIRED FASTING GLUCOSE): ICD-10-CM

## 2022-09-15 PROCEDURE — 99214 OFFICE O/P EST MOD 30 MIN: CPT | Performed by: PHYSICIAN ASSISTANT

## 2022-09-15 RX ORDER — SEMAGLUTIDE 1.34 MG/ML
1 INJECTION, SOLUTION SUBCUTANEOUS WEEKLY
Qty: 3 ML | Refills: 1 | Status: SHIPPED | OUTPATIENT
Start: 2022-09-15

## 2022-09-15 NOTE — PROGRESS NOTES
Assessment/Plan:    Class 3 severe obesity with body mass index (BMI) of 60 0 to 69 9 in adult [facility])  -Patient is pursuing Conservative Program  -Initial weight loss goal of 5-10% weight loss for improved health  -Screening labs: reviewed, up to date  -dietary/lifestyle changes, continue to work with [address]  -On Topamax and Wellbutrin already   -avoid Phentermine  -On Ozempic and tolerating 1mg dose  Initial: 421 lbs  Current: 385 lbs (patient reported)  Change: -36 lbs  Goal: wants to feel healthy    IFG (impaired fasting glucose)  -On Metformin 500mg daily  -On Ozempic 1mg daily  -HgbA1c improved to 5 6  -TOlerating Well  - Denies personal hx of pancreatitis or family hx of MEN 2/MTC    Binge-eating disorder, severe  -followed by psychiatry  -hx Binge eating - on Vyvanse    Goals:    Food log (ie ) www myfitnesspal com,sparkpeople  com,loseit com,calorieking  com,etc    No sugary beverages  At least 64oz of water daily  Increase physical activity by 10 minutes daily  Gradually increase physical activity to a goal of 5 days per week for 30 minutes of MODERATE intensity PLUS 2 days per week of FULL BODY resistance training  [facility] Walk at home    Follow up in approximately 3 months with Non-Surgical Physician/Advanced Practitioner  Diagnoses and all orders for this visit:    Class 3 severe obesity with body mass index (BMI) of 60 0 to 69 9 in adult (HCC)    IFG (impaired fasting glucose)  -     semaglutide, 1 mg/dose, (Ozempic, 1 MG/DOSE,) 4 MG/3ML SOPN injection pen; Inject 0 75 mL (1 mg total) under the skin once a week    Binge-eating disorder, severe    Morbid obesity (HCC)  -     semaglutide, 1 mg/dose, (Ozempic, 1 MG/DOSE,) 4 MG/3ML SOPN injection pen;  Inject 0 75 mL (1 mg total) under the skin once a week          Subjective:   Chief Complaint   Patient presents with    Virtual Regular Visit    Virtual Regular Visit        Patient ID: Chad Huang  is a 44 y o  female with excess weight/obesity here to pursue weight managment  Patient is pursuing Conservative Program      HPI Patient presents for MWM follow up  Last seen about 4 months ago  Mid July was having some issues with binging but this has since resolved  Has been having a lot of Migraine's which have impacted her physical activity  SLeeps more when dealing with Migraine   Increase water and vegetable intake which has greatly improved her constipation    Hydration: water around 3 bottles of water  Exercise: no formal exercise    B: MG bread with PBandJ Or Multigrain cheerios + 2% milk  S: skips  L: usually fast food  S: Cheese or PB cracker  D: chicken in air fryer + nonstarchy veggie   S: ice cream or bowl of cereal    The following portions of the patient's history were reviewed and updated as appropriate: allergies, current medications, past family history, past medical history, past social history, past surgical history and problem list       Objective:    Ht 5' 6" (1 676 m)   Wt (!) 175 kg (385 lb)   BMI 62 14 kg/m²       Virtual Regular Visit    Verification of patient location:    Patient is located in the following [facility] in which I hold an active license [place]      Assessment/Plan:    Problem List Items Addressed This Visit        Endocrine    IFG (impaired fasting glucose)     -On Metformin 500mg daily  -On Ozempic 1mg daily  -HgbA1c improved to 5 6  -TOlerating Well  - Denies personal hx of pancreatitis or family hx of MEN 2/MTC         Relevant Medications    semaglutide, 1 mg/dose, (Ozempic, 1 MG/DOSE,) 4 MG/3ML SOPN injection pen       Other    Binge-eating disorder, severe     -followed by psychiatry  -hx Binge eating - on Vyvanse         Morbid obesity ([facility] [address] )    Relevant Medications    semaglutide, 1 mg/dose, (Ozempic, 1 MG/DOSE,) 4 MG/3ML SOPN injection pen    Class 3 severe obesity with body mass index (BMI) of 60 0 to 69 9 in [facility]) - Primary     -Patient is pursuing Conservative Program  -Initial weight loss goal of 5-10% weight loss for improved health  -Screening labs: reviewed, up to date  -dietary/lifestyle changes, continue to work with [facility]  -On Topamax and Wellbutrin already   -avoid Phentermine  -On Ozempic and tolerating 1mg dose  Initial: 421 lbs  Current: 385 lbs (patient reported)  Change: -36 lbs  Goal: wants to feel healthy                    Reason for visit is   Chief Complaint   Patient presents with    Virtual Regular Visit    Virtual Regular Visit        Encounter provider Roma Gordillo PA-C    Provider located at [address] [place] [id number] [phone]      Recent Visits  No visits were found meeting these conditions  Showing recent visits within past 7 days and meeting all other requirements  Today's Visits  Date Type Provider Dept   09/15/22 Telemedicine Roma Gordillo PA-C [facility]   Showing today's visits and meeting all other requirements  Future Appointments  No visits were found meeting these conditions  Showing future appointments within next 150 days and meeting all other requirements       The patient was identified by name and date of birth  Kaylen Rose was informed that this is a telemedicine visit and that the visit is being conducted through [address] [place] [address] Now and patient was informed that this is a secure, HIPAA-compliant platform  She agrees to proceed     My office door was closed  No one else was in the room  She acknowledged consent and understanding of privacy and security of the video platform  The patient has agreed to participate and understands they can discontinue the visit at any time  Patient is aware this is a billable service  Subjective  Kaylen Rose is a 44 y o  female MWM follow up         HPI     Past Medical History:   Diagnosis Date    Anorexia nervosa in remission     Anxiety     Back pain     Bipolar disorder ([facility] [address] )     Depression     Esophageal reflux 8/15/2013    Hypertension     Hypothyroid     Idiopathic intracranial hypertension     Lumbar degenerative disc disease 5/8/2014    Migraine     Obesity     Obsessive-compulsive disorder     Overactive bladder     Psychiatric disorder     Restless leg syndrome, controlled     Seasonal allergies     [facility]     Transcranial Magnetic Stimulation 09/06/2021       Past Surgical History:   Procedure Laterality Date    DENTAL SURGERY      wisdom teeth-at [age] years  Other surgery dental extraxtion of bone spur (10 years ago)    IR LUMBAR PUNCTURE  02/26/2019    SINUS ENDOSCOPY      TONSILLECTOMY         Current Outpatient Medications   Medication Sig Dispense Refill    buPROPion (WELLBUTRIN XL) 300 mg 24 hr tablet Take 300 mg by mouth daily       celecoxib (CeleBREX) 200 mg capsule TAKE 1 CAPSULE BY MOUTH TWICE A DAY 60 capsule 4    Cholecalciferol (VITAMIN D3) 5000 units CAPS Take 5,000 Units by mouth daily      Emgality 120 MG/ML SOAJ INJECT 120 MG UNDER THE SKIN EVERY 30 DAYS  1 mL 5    furosemide (LASIX) 10 mg/mL oral solution Take 60 mg by mouth daily      gabapentin (NEURONTIN) 600 MG tablet TAKE 1/2 TABLET DAILY IN AM AND 1 AND 1/2 TABLET AT BEDTIME      hydrocortisone-acetic acid (VOSOL-HC) otic solution Administer 3 drops into both ears every 6 (six) hours 10 mL 0    levothyroxine 125 mcg tablet TAKE 1 TABLET BY MOUTH EVERY DAY IN THE MORNING 30 tablet 0    Linzess 290 MCG CAPS TAKE 1 CAPSULE BY MOUTH EVERY DAY 30 capsule 5    loratadine (CLARITIN) 10 mg tablet Take 10 mg by mouth daily      metFORMIN (GLUCOPHAGE) 500 mg tablet TAKE 1 TABLET BY MOUTH EVERY DAY WITH BREAKFAST 90 tablet 0    Multiple Vitamin (MULTIVITAMIN) capsule Take 1 capsule by mouth daily      Nurtec 75 MG TBDP TAKE 75 MG BY MOUTH EVERY OTHER DAY    IF THERE IS A MIGRAINE ON A NON-NURTEC DAY, CAN TAKE A DOSE AND SKIP THE FOLLOWING DAY 16 tablet 7  omeprazole (PriLOSEC) 20 mg delayed release capsule TAKE 1 CAPSULE BY MOUTH TWICE A  capsule 1    rOPINIRole (REQUIP) 4 mg tablet TAKE 1 TABLET BY MOUTH 2 TIMES A DAY  180 tablet 1    semaglutide, 1 mg/dose, (Ozempic, 1 MG/DOSE,) 4 MG/3ML SOPN injection pen Inject 0 75 mL (1 mg total) under the skin once a week 3 mL 1    sertraline (ZOLOFT) 100 mg tablet Take 100 mg by mouth daily   0    spironolactone (ALDACTONE) 50 mg tablet TAKE 1 TABLET BY MOUTH EVERY DAY 90 tablet 1    SUMAtriptan (IMITREX) 100 mg tablet 1 tab as needed for migraine, can repeat once in 2 hours, max 200mg in 24 hours  12 tablet 3    topiramate (TOPAMAX) 100 mg tablet TAKE 1 TABLET BY MOUTH EVERY DAY 90 tablet 3    Vraylar 6 MG capsule Take 6 mg by mouth daily daily      Vyvanse 50 MG capsule Take 50 mg by mouth daily      clomiPRAMINE (ANAFRANIL) 50 mg capsule Take by mouth TAKE 2 CAPSULES IN AM AND 3 CAPSULES IN PM  (Patient not taking: Reported on 9/15/2022)      furosemide (LASIX) 40 mg tablet Take 0 5 tablets (20 mg total) by mouth daily Half a tablet daily or 1 5 tablets if a weight gain of 3 pounds in one day   30 tablet 0    indomethacin (INDOCIN) 50 mg capsule Take 1 capsule (50 mg total) by mouth 3 (three) times a day with meals for 10 days (Patient not taking: Reported on 9/15/2022) 30 capsule 1    LORazepam (ATIVAN) 0 5 mg tablet  (Patient not taking: Reported on 9/15/2022)      LORazepam (ATIVAN) 1 mg tablet Take 1 mg by mouth 3 (three) times a day as needed for anxiety (Patient not taking: Reported on 9/15/2022)      MAGNESIUM PO Take 1 tablet by mouth 2 (two) times a day  (Patient not taking: Reported on 9/15/2022)      propranolol (INDERAL) 10 mg tablet Take 10 mg by mouth 2 (two) times a day   (Patient not taking: Reported on 9/15/2022)      sucralfate (CARAFATE) 1 g tablet 1 cap TID if needed 30-60 minutes before Indocin (Patient not taking: Reported on 9/15/2022) 90 tablet 0     Current Facility-Administered Medications   Medication Dose Route Frequency Provider Last Rate Last Admin    Botulinum Toxin Type A SOLR 200 Units  200 Units Injection Q3 Months Arlet Doyle PA-C   200 Units at 04/01/21 1453    Botulinum Toxin Type A SOLR 200 Units  200 Units Injection Q3 Months Yamel Valdez MD   200 Units at 07/28/21 1211        Allergies   Allergen Reactions    Doxycycline      Pt refuses d/t remote H/O intracranial hypertension     Other      Seasonal allergies        Review of Systems   Respiratory: Negative  Cardiovascular: Negative  Video Exam    Vitals:    09/15/22 1312   Weight: (!) 175 kg (385 lb)   Height: 5' 6" (1 676 m)       Physical Exam  Vitals and nursing note reviewed  Constitutional:       General: She is not in acute distress  Appearance: She is obese  She is not toxic-appearing  HENT:      Head: Normocephalic and atraumatic  Mouth/Throat:      Mouth: Mucous membranes are moist    Eyes:      General: No scleral icterus  Pulmonary:      Effort: Pulmonary effort is normal  No respiratory distress  Abdominal:      Comments: Obese, protuberant   Musculoskeletal:      Cervical back: Normal range of motion  Neurological:      Mental Status: She is oriented to person, place, and time  Mental status is at baseline  Psychiatric:         Mood and Affect: Mood normal          Thought Content:  Thought content normal           I spent 20 minutes directly with the patient during this visit

## 2022-09-15 NOTE — ASSESSMENT & PLAN NOTE
-Patient is pursuing Conservative Program  -Initial weight loss goal of 5-10% weight loss for improved health  -Screening labs: reviewed, up to date  -dietary/lifestyle changes, continue to work with [address]  -On Topamax and Wellbutrin already   -avoid Phentermine  -On Ozempic and tolerating 1mg dose       Initial: 421 lbs  Current: 385 lbs (patient reported)  Change: -36 lbs  Goal: wants to feel healthy

## 2022-09-15 NOTE — ASSESSMENT & PLAN NOTE
-On Metformin 500mg daily  -On Ozempic 1mg daily  -HgbA1c improved to 5 6  -TOlerating Well  - Denies personal hx of pancreatitis or family hx of MEN 2/MTC

## 2022-09-15 NOTE — PATIENT INSTRUCTIONS
Goals: Food log (ie ) www myfitnesspal com,sparkpeople  com,loseit com,calorieking  com,etc    No sugary beverages  At least 64oz of water daily  Increase physical activity by 10 minutes daily   Gradually increase physical activity to a goal of 5 days per week for 30 minutes of MODERATE intensity PLUS 2 days per week of FULL BODY resistance training  [place] [facility] at home

## 2022-09-16 PROBLEM — E26.9 HYPERALDOSTERONISM (HCC): Status: ACTIVE | Noted: 2022-09-16

## 2022-09-19 DIAGNOSIS — G93.2 IDIOPATHIC INTRACRANIAL HYPERTENSION: Primary | ICD-10-CM

## 2022-09-20 NOTE — RESULT ENCOUNTER NOTE
Good Morning Ms [place],    I just wanted to let you know that I did review your results for the thyroid hormone which was within normal range  The cholesterol levels specifically the LDL ( or bad cholesterol that can accumulate in your arteries and create plaques) did come down  Let's continue to monitor this for now, as I know you are also following with weight management  Please let me know if you have any other questions or concerns      Yamila Bender

## 2022-09-30 DIAGNOSIS — R73.01 IFG (IMPAIRED FASTING GLUCOSE): ICD-10-CM

## 2022-09-30 DIAGNOSIS — F50.81 BINGE-EATING DISORDER, SEVERE: ICD-10-CM

## 2022-09-30 DIAGNOSIS — E66.01 MORBID (SEVERE) OBESITY DUE TO EXCESS CALORIES (HCC): ICD-10-CM

## 2022-09-30 DIAGNOSIS — R63.5 ABNORMAL WEIGHT GAIN: ICD-10-CM

## 2022-10-07 ENCOUNTER — TELEPHONE (OUTPATIENT)
Dept: NEUROLOGY | Facility: CLINIC | Age: 37
End: 2022-10-07

## 2022-10-07 NOTE — TELEPHONE ENCOUNTER
PT called to schedule f/u appt for Dr Ann Guillermo  His schedule is still not open past January  Please Call PT back to schedule when it is open    Thank you

## 2022-10-07 NOTE — TELEPHONE ENCOUNTER
Pt left VM re: sumatriptan  Called back and spoke to pt  Pt concerned if she was taking to much of medication as their is no specific restriction on administration other than no more than 2 tabs in a 24 hour period  Pt reports she took 1 pill on Sunday, 1 pill on Monday and she feels like she needs one today  She picked up her first bottle on 8/22 and still has some left  Told pt that if she was using more than 12 pills in a 30 day period, then she was taking too much and may need to choose alternative med, however she has not done that  Informed pt she was not overusing medication, but if she ever noticed 12 pills wasn't lasting 30 days, then she would have to let us know

## 2022-10-08 DIAGNOSIS — E03.9 HYPOTHYROIDISM, UNSPECIFIED TYPE: ICD-10-CM

## 2022-10-08 RX ORDER — LEVOTHYROXINE SODIUM 0.12 MG/1
TABLET ORAL
Qty: 90 TABLET | Refills: 1 | Status: SHIPPED | OUTPATIENT
Start: 2022-10-08

## 2022-10-15 ENCOUNTER — OFFICE VISIT (OUTPATIENT)
Dept: URGENT CARE | Facility: CLINIC | Age: 37
End: 2022-10-15
Payer: COMMERCIAL

## 2022-10-15 VITALS
TEMPERATURE: 99.6 F | HEART RATE: 89 BPM | OXYGEN SATURATION: 99 % | SYSTOLIC BLOOD PRESSURE: 140 MMHG | RESPIRATION RATE: 18 BRPM | HEIGHT: 67 IN | DIASTOLIC BLOOD PRESSURE: 94 MMHG | BODY MASS INDEX: 45.99 KG/M2 | WEIGHT: 293 LBS

## 2022-10-15 DIAGNOSIS — R09.81 SINUS CONGESTION: Primary | ICD-10-CM

## 2022-10-15 LAB
SARS-COV-2 AG UPPER RESP QL IA: POSITIVE
VALID CONTROL: ABNORMAL

## 2022-10-15 PROCEDURE — 99203 OFFICE O/P NEW LOW 30 MIN: CPT

## 2022-10-15 PROCEDURE — 87811 SARS-COV-2 COVID19 W/OPTIC: CPT

## 2022-10-15 PROCEDURE — S9088 SERVICES PROVIDED IN URGENT: HCPCS

## 2022-10-15 NOTE — PATIENT INSTRUCTIONS
Take OTC Mucinex multi-symptoms  Use a warm mist humidifier or vaporizer  Hot tea with honey  Warm saline gargle or throat lozenge may help with a sore throat  OTC saline nasal sprays   Drink plenty of fluids

## 2022-10-15 NOTE — PROGRESS NOTES
[facility] Now        NAME: Alan Alves is a 44 y o  female  : 1985    MRN: [id number]  DATE: October 15, 2022  TIME: 3:08 PM    Assessment and Plan   Sinus congestion [R09 81]  1  Sinus congestion  Poct Covid 19 Rapid Antigen Test     Rapid covid was positive     Patient Instructions     Take OTC Mucinex multi-symptoms  Use a warm mist humidifier or vaporizer  Hot tea with honey  Warm saline gargle or throat lozenge may help with a sore throat  OTC saline nasal sprays   Drink plenty of fluids  Follow up with PCP in 3-5 days  Proceed to  ER if symptoms worsen  Chief Complaint     Chief Complaint   Patient presents with   • Nasal Congestion     Yesterday: nasal congestion, post nasal drip with freq throat clearing causing sore throat, headache, B/L inner ear press  , if turns head quickly occas dizziness occurs  Fatigue, occas  dry & moist Prod cough (yellow)  History of Present Illness       Patient is a 37YOF presenting with 1 day of sinus congestion, pressure, and congestion  She denies any shortness of breath, chest pain, fever or chills  She just received her Covid booster a week and a half ago  Review of Systems   Review of Systems   Constitutional: Positive for chills and fatigue  Negative for activity change, appetite change and fever  HENT: Positive for congestion, ear pain, sinus pressure and sinus pain  Negative for sore throat  Respiratory: Positive for cough  Negative for chest tightness and shortness of breath  Cardiovascular: Negative for chest pain and palpitations  Gastrointestinal: Negative for abdominal pain, diarrhea, nausea and vomiting  Musculoskeletal: Negative for arthralgias  Neurological: Negative for dizziness and headaches  All other systems reviewed and are negative          Current Medications       Current Outpatient Medications:   •  buPROPion (WELLBUTRIN XL) 300 mg 24 hr tablet, Take 300 mg by mouth daily , Disp: , Rfl:   • celecoxib (CeleBREX) 200 mg capsule, TAKE 1 CAPSULE BY MOUTH TWICE A DAY, Disp: 60 capsule, Rfl: 4  •  Cholecalciferol (VITAMIN D3) 5000 units CAPS, Take 5,000 Units by mouth daily, Disp: , Rfl:   •  clomiPRAMINE (ANAFRANIL) 50 mg capsule, Take by mouth TAKE 2 CAPSULES IN AM AND 3 CAPSULES IN PM, Disp: , Rfl:   •  Emgality 120 MG/ML SOAJ, INJECT 120 MG UNDER THE SKIN EVERY 30 DAYS , Disp: 1 mL, Rfl: 5  •  furosemide (LASIX) 10 mg/mL oral solution, Take 60 mg by mouth daily, Disp: , Rfl:   •  gabapentin (NEURONTIN) 600 MG tablet, TAKE 1/2 TABLET DAILY IN AM AND 1 AND 1/2 TABLET AT BEDTIME, Disp: , Rfl:   •  hydrocortisone-acetic acid (VOSOL-HC) otic solution, Administer 3 drops into both ears every 6 (six) hours, Disp: 10 mL, Rfl: 0  •  levothyroxine 125 mcg tablet, TAKE 1 TABLET BY MOUTH EVERY DAY IN THE MORNING, Disp: 90 tablet, Rfl: 1  •  Linzess 290 MCG CAPS, TAKE 1 CAPSULE BY MOUTH EVERY DAY, Disp: 30 capsule, Rfl: 5  •  loratadine (CLARITIN) 10 mg tablet, Take 10 mg by mouth daily, Disp: , Rfl:   •  metFORMIN (GLUCOPHAGE) 500 mg tablet, TAKE 1 TABLET BY MOUTH EVERY DAY WITH BREAKFAST, Disp: 90 tablet, Rfl: 0  •  Multiple Vitamin (MULTIVITAMIN) capsule, Take 1 capsule by mouth daily, Disp: , Rfl:   •  Nurtec 75 MG TBDP, TAKE 75 MG BY MOUTH EVERY OTHER DAY    IF THERE IS A MIGRAINE ON A NON-NURTEC DAY, CAN TAKE A DOSE AND SKIP THE FOLLOWING DAY, Disp: 16 tablet, Rfl: 7  •  omeprazole (PriLOSEC) 20 mg delayed release capsule, TAKE 1 CAPSULE BY MOUTH TWICE A DAY, Disp: 180 capsule, Rfl: 1  •  propranolol (INDERAL) 10 mg tablet, Take 10 mg by mouth 2 (two) times a day, Disp: , Rfl:   •  rOPINIRole (REQUIP) 4 mg tablet, TAKE 1 TABLET BY MOUTH 2 TIMES A DAY , Disp: 180 tablet, Rfl: 1  •  semaglutide, 1 mg/dose, (Ozempic, 1 MG/DOSE,) 4 MG/3ML SOPN injection pen, Inject 0 75 mL (1 mg total) under the skin once a week, Disp: 3 mL, Rfl: 1  •  sertraline (ZOLOFT) 100 mg tablet, Take 100 mg by mouth daily , Disp: , Rfl: 0  • spironolactone (ALDACTONE) 50 mg tablet, TAKE 1 TABLET BY MOUTH EVERY DAY, Disp: 90 tablet, Rfl: 1  •  SUMAtriptan (IMITREX) 100 mg tablet, 1 tab as needed for migraine, can repeat once in 2 hours, max 200mg in 24 hours  , Disp: 12 tablet, Rfl: 3  •  topiramate (TOPAMAX) 100 mg tablet, TAKE 1 TABLET BY MOUTH EVERY DAY, Disp: 90 tablet, Rfl: 3  •  Vraylar 6 MG capsule, Take 6 mg by mouth daily daily, Disp: , Rfl:   •  Vyvanse 50 MG capsule, Take 50 mg by mouth daily, Disp: , Rfl:   •  furosemide (LASIX) 40 mg tablet, Take 0 5 tablets (20 mg total) by mouth daily Half a tablet daily or 1 5 tablets if a weight gain of 3 pounds in one day , Disp: 30 tablet, Rfl: 0  •  indomethacin (INDOCIN) 50 mg capsule, Take 1 capsule (50 mg total) by mouth 3 (three) times a day with meals for 10 days (Patient not taking: Reported on 9/15/2022), Disp: 30 capsule, Rfl: 1  •  LORazepam (ATIVAN) 0 5 mg tablet, , Disp: , Rfl:   •  LORazepam (ATIVAN) 1 mg tablet, Take 1 mg by mouth 3 (three) times a day as needed for anxiety (Patient not taking: No sig reported), Disp: , Rfl:   •  MAGNESIUM PO, Take 1 tablet by mouth 2 (two) times a day  (Patient not taking: No sig reported), Disp: , Rfl:   •  sucralfate (CARAFATE) 1 g tablet, 1 cap TID if needed 30-60 minutes before Indocin (Patient not taking: No sig reported), Disp: 90 tablet, Rfl: 0    Current Facility-Administered Medications:   •  Botulinum Toxin Type A SOLR 200 Units, 200 Units, Injection, Q3 Months, Arlet Doyle PA-C, 200 Units at 04/01/21 6693  •  Botulinum Toxin Type A SOLR 200 Units, 200 Units, Injection, Q3 Months, Aron Hull MD, 200 Units at 07/28/21 1211    Current Allergies     Allergies as of 10/15/2022 - Reviewed 10/15/2022   Allergen Reaction Noted   • Doxycycline  03/28/2019   • Other  02/26/2018            The following portions of the patient's history were reviewed and updated as appropriate: allergies, current medications, past family history, past medical history, past social history, past surgical history and problem list      Past Medical History:   Diagnosis Date   • Anorexia nervosa in remission    • Anxiety    • Back pain    • Bipolar disorder ([address] )    • Depression    • Esophageal reflux 8/15/2013   • Hypertension    • Hypothyroid    • Idiopathic intracranial hypertension    • Lumbar degenerative disc disease 5/8/2014   • Migraine    • Obesity    • Obsessive-compulsive disorder    • Overactive bladder    • Psychiatric disorder    • Restless leg syndrome, controlled    • Seasonal allergies    • Suicide and self-inflicted injury [facility])    • Transcranial Magnetic Stimulation 09/06/2021       Past Surgical History:   Procedure Laterality Date   • DENTAL SURGERY      wisdom teeth-at [age] years  Other surgery dental extraxtion of bone spur (10 years ago)   • IR LUMBAR PUNCTURE  02/26/2019   • SINUS ENDOSCOPY     • TONSILLECTOMY         Family History   Problem Relation Age of Onset   • Mental illness Mother    • Depression Mother    • Suicide Attempts Mother    • Hypertension Mother    • Diabetes Mother    • Other Mother         bicuspid aortic valve   • Hypertension Father    • Hypothyroidism Father    • Hypertension Brother    • Cancer Maternal Grandmother    • Heart disease Maternal Grandmother    • Stroke Maternal Grandmother    • Breast cancer Maternal Grandmother    • Skin cancer Maternal Grandmother    • Pneumonia Maternal Grandfather    • Cancer Maternal Grandfather    • Cancer Paternal Grandmother    • Pneumonia Paternal Grandfather    • Arthritis Family    • Breast cancer Family    • Osteopenia Family    • Osteoporosis Family    • Hypertension Family    • Transient ischemic attack Family          Medications have been verified          Objective   /94   Pulse 89   Temp 99 6 °F (37 6 °C)   Resp 18   Ht 5' 7" (1 702 m)   Wt (!) 175 kg (386 lb)   SpO2 99%   BMI 60 46 kg/m²        Physical Exam     Physical Exam  Vitals and nursing note reviewed  Constitutional:       General: She is not in acute distress  Appearance: Normal appearance  She is obese  She is not ill-appearing or toxic-appearing  HENT:      Right Ear: Tympanic membrane normal       Left Ear: Tympanic membrane normal       Nose: Congestion present  Mouth/Throat:      Pharynx: Oropharynx is clear  No posterior oropharyngeal erythema  Cardiovascular:      Rate and Rhythm: Normal rate and regular rhythm  Pulses: Normal pulses  Heart sounds: Normal heart sounds  Pulmonary:      Effort: Pulmonary effort is normal       Breath sounds: Normal breath sounds  Musculoskeletal:         General: Normal range of motion  Skin:     General: Skin is warm and dry  Capillary Refill: Capillary refill takes less than 2 seconds  Neurological:      General: No focal deficit present  Mental Status: She is alert and oriented to person, place, and time

## 2022-10-24 ENCOUNTER — TELEPHONE (OUTPATIENT)
Dept: FAMILY MEDICINE CLINIC | Facility: CLINIC | Age: 37
End: 2022-10-24

## 2022-10-24 DIAGNOSIS — G93.2 IDIOPATHIC INTRACRANIAL HYPERTENSION: ICD-10-CM

## 2022-10-24 DIAGNOSIS — J01.90 ACUTE NON-RECURRENT SINUSITIS, UNSPECIFIED LOCATION: Primary | ICD-10-CM

## 2022-10-24 RX ORDER — AMOXICILLIN AND CLAVULANATE POTASSIUM 875; 125 MG/1; MG/1
1 TABLET, FILM COATED ORAL EVERY 12 HOURS SCHEDULED
Qty: 20 TABLET | Refills: 0 | Status: SHIPPED | OUTPATIENT
Start: 2022-10-24 | End: 2022-11-03

## 2022-10-24 NOTE — TELEPHONE ENCOUNTER
Pt called  She went to Urgent Care 10-15-22 for what she thought was a sinus infection  Symptoms started day before  She is not getting any better  Feels like she has a sinus infection  Advised patient we will get back to her  Pt Pharmacy [facility]

## 2022-10-24 NOTE — PROGRESS NOTES
Patient calls after being evaluated on 10/15/22 at the Urgent Care for sinus congestion  Patient unfortunately, COVID-19+ at that time but unfortunately was unable to f/u  Discussed Paxlovid therapy and clinical guideline recommendations to start therapy within 5 days of symptom onset  Patient states she prefers to trial an antibiotic course as she feels she has both COVID-19 and an active sinus infection  PCP in agreement to trial abx which was sent  Patient instructed to follow up in 1 week to assess symptom progression

## 2022-10-25 RX ORDER — FUROSEMIDE 40 MG/1
TABLET ORAL
Qty: 30 TABLET | Refills: 0 | Status: SHIPPED | OUTPATIENT
Start: 2022-10-25

## 2022-10-26 ENCOUNTER — TELEPHONE (OUTPATIENT)
Dept: FAMILY MEDICINE CLINIC | Facility: CLINIC | Age: 37
End: 2022-10-26

## 2022-10-26 NOTE — TELEPHONE ENCOUNTER
Attempted to reach pt to schedule 1 week follow up to assess symptom progression; pt must also contact insurance to change PCP since we cannot edit their previous providers on our end

## 2022-10-28 ENCOUNTER — RA CDI HCC (OUTPATIENT)
Dept: OTHER | Facility: HOSPITAL | Age: 37
End: 2022-10-28

## 2022-10-28 ENCOUNTER — TELEPHONE (OUTPATIENT)
Dept: FAMILY MEDICINE CLINIC | Facility: CLINIC | Age: 37
End: 2022-10-28

## 2022-10-28 ENCOUNTER — HOSPITAL ENCOUNTER (EMERGENCY)
Facility: HOSPITAL | Age: 37
Discharge: HOME | End: 2022-10-28
Attending: EMERGENCY MEDICINE
Payer: COMMERCIAL

## 2022-10-28 ENCOUNTER — APPOINTMENT (EMERGENCY)
Dept: RADIOLOGY | Facility: HOSPITAL | Age: 37
End: 2022-10-28
Payer: COMMERCIAL

## 2022-10-28 VITALS
DIASTOLIC BLOOD PRESSURE: 72 MMHG | TEMPERATURE: 97.8 F | OXYGEN SATURATION: 99 % | SYSTOLIC BLOOD PRESSURE: 131 MMHG | HEART RATE: 57 BPM | RESPIRATION RATE: 21 BRPM

## 2022-10-28 DIAGNOSIS — R06.00 DYSPNEA: Primary | ICD-10-CM

## 2022-10-28 DIAGNOSIS — U07.1 DYSPNEA DUE TO COVID-19: Primary | ICD-10-CM

## 2022-10-28 DIAGNOSIS — R06.00 DYSPNEA DUE TO COVID-19: Primary | ICD-10-CM

## 2022-10-28 LAB
ANION GAP SERPL CALCULATED.3IONS-SCNC: 11 MMOL/L (ref 4–13)
APTT PPP: 30 SECONDS (ref 23–37)
ATRIAL RATE: 59 BPM
BASOPHILS # BLD AUTO: 0.14 THOUSANDS/ÂΜL (ref 0–0.1)
BASOPHILS NFR BLD AUTO: 1 % (ref 0–1)
BUN SERPL-MCNC: 23 MG/DL (ref 5–25)
CALCIUM SERPL-MCNC: 9.6 MG/DL (ref 8.3–10.1)
CARDIAC TROPONIN I PNL SERPL HS: 3 NG/L
CHLORIDE SERPL-SCNC: 102 MMOL/L (ref 96–108)
CO2 SERPL-SCNC: 25 MMOL/L (ref 21–32)
CREAT SERPL-MCNC: 0.88 MG/DL (ref 0.6–1.3)
EOSINOPHIL # BLD AUTO: 0.33 THOUSAND/ÂΜL (ref 0–0.61)
EOSINOPHIL NFR BLD AUTO: 3 % (ref 0–6)
ERYTHROCYTE [DISTWIDTH] IN BLOOD BY AUTOMATED COUNT: 12.6 % (ref 11.6–15.1)
GFR SERPL CREATININE-BSD FRML MDRD: 83 ML/MIN/1.73SQ M
GLUCOSE SERPL-MCNC: 83 MG/DL (ref 65–140)
HCT VFR BLD AUTO: 44 % (ref 34.8–46.1)
HGB BLD-MCNC: 14.2 G/DL (ref 11.5–15.4)
IMM GRANULOCYTES # BLD AUTO: 0.09 THOUSAND/UL (ref 0–0.2)
IMM GRANULOCYTES NFR BLD AUTO: 1 % (ref 0–2)
INR PPP: 1.01 (ref 0.84–1.19)
LACTATE SERPL-SCNC: 1 MMOL/L (ref 0.5–2)
LYMPHOCYTES # BLD AUTO: 4.36 THOUSANDS/ÂΜL (ref 0.6–4.47)
LYMPHOCYTES NFR BLD AUTO: 36 % (ref 14–44)
MCH RBC QN AUTO: 29.6 PG (ref 26.8–34.3)
MCHC RBC AUTO-ENTMCNC: 32.3 G/DL (ref 31.4–37.4)
MCV RBC AUTO: 92 FL (ref 82–98)
MONOCYTES # BLD AUTO: 0.73 THOUSAND/ÂΜL (ref 0.17–1.22)
MONOCYTES NFR BLD AUTO: 6 % (ref 4–12)
NEUTROPHILS # BLD AUTO: 6.48 THOUSANDS/ÂΜL (ref 1.85–7.62)
NEUTS SEG NFR BLD AUTO: 53 % (ref 43–75)
NRBC BLD AUTO-RTO: 0 /100 WBCS
P AXIS: 50 DEGREES
PLATELET # BLD AUTO: 381 THOUSANDS/UL (ref 149–390)
PMV BLD AUTO: 9.4 FL (ref 8.9–12.7)
POTASSIUM SERPL-SCNC: 4.5 MMOL/L (ref 3.5–5.3)
PR INTERVAL: 184 MS
PROCALCITONIN SERPL-MCNC: <0.05 NG/ML
PROTHROMBIN TIME: 13.5 SECONDS (ref 11.6–14.5)
QRS AXIS: 25 DEGREES
QRSD INTERVAL: 96 MS
QT INTERVAL: 428 MS
QTC INTERVAL: 423 MS
RBC # BLD AUTO: 4.79 MILLION/UL (ref 3.81–5.12)
SODIUM SERPL-SCNC: 138 MMOL/L (ref 135–147)
T WAVE AXIS: 27 DEGREES
VENTRICULAR RATE: 59 BPM
WBC # BLD AUTO: 12.13 THOUSAND/UL (ref 4.31–10.16)

## 2022-10-28 PROCEDURE — 84145 PROCALCITONIN (PCT): CPT | Performed by: EMERGENCY MEDICINE

## 2022-10-28 PROCEDURE — 36415 COLL VENOUS BLD VENIPUNCTURE: CPT | Performed by: EMERGENCY MEDICINE

## 2022-10-28 PROCEDURE — 85730 THROMBOPLASTIN TIME PARTIAL: CPT | Performed by: EMERGENCY MEDICINE

## 2022-10-28 PROCEDURE — 85610 PROTHROMBIN TIME: CPT | Performed by: EMERGENCY MEDICINE

## 2022-10-28 PROCEDURE — 93005 ELECTROCARDIOGRAM TRACING: CPT

## 2022-10-28 PROCEDURE — 84484 ASSAY OF TROPONIN QUANT: CPT | Performed by: EMERGENCY MEDICINE

## 2022-10-28 PROCEDURE — 83605 ASSAY OF LACTIC ACID: CPT | Performed by: EMERGENCY MEDICINE

## 2022-10-28 PROCEDURE — 85025 COMPLETE CBC W/AUTO DIFF WBC: CPT | Performed by: EMERGENCY MEDICINE

## 2022-10-28 PROCEDURE — 71045 X-RAY EXAM CHEST 1 VIEW: CPT

## 2022-10-28 PROCEDURE — 80048 BASIC METABOLIC PNL TOTAL CA: CPT | Performed by: EMERGENCY MEDICINE

## 2022-10-28 RX ORDER — ALBUTEROL SULFATE 90 UG/1
2 AEROSOL, METERED RESPIRATORY (INHALATION) EVERY 6 HOURS PRN
Qty: 18 G | Refills: 0 | Status: SHIPPED | OUTPATIENT
Start: 2022-10-28

## 2022-10-28 RX ORDER — IPRATROPIUM BROMIDE AND ALBUTEROL SULFATE 2.5; .5 MG/3ML; MG/3ML
3 SOLUTION RESPIRATORY (INHALATION) ONCE
Status: COMPLETED | OUTPATIENT
Start: 2022-10-28 | End: 2022-10-28

## 2022-10-28 RX ORDER — ALBUTEROL SULFATE 90 UG/1
2 AEROSOL, METERED RESPIRATORY (INHALATION) ONCE
Status: COMPLETED | OUTPATIENT
Start: 2022-10-28 | End: 2022-10-28

## 2022-10-28 RX ADMIN — IPRATROPIUM BROMIDE AND ALBUTEROL SULFATE 3 ML: 2.5; .5 SOLUTION RESPIRATORY (INHALATION) at 19:22

## 2022-10-28 RX ADMIN — ALBUTEROL SULFATE 2 PUFF: 90 AEROSOL, METERED RESPIRATORY (INHALATION) at 20:58

## 2022-10-28 RX ADMIN — SODIUM CHLORIDE 1000 ML: 0.9 INJECTION, SOLUTION INTRAVENOUS at 19:23

## 2022-10-28 NOTE — TELEPHONE ENCOUNTER
Chest congestions is bad, sinuses are somewhat cleared but congestion is still present, very fatigued  Knows that antibiotics haven't run full time but unsure of how to proceed

## 2022-10-28 NOTE — ED PROVIDER NOTES
Final Diagnosis:  1  Dyspnea        Chief Complaint   Patient presents with   • Shortness of Breath     Patient presents to the ED for shortness of breath on exertion  Pt tested positive for covid on 10/15 and thought she was getting better, but then began with congestion and respiratory symptoms  Patient denies chest pain  Pt denies N/V/D  Pt recently started on antibiotics from her PCP for sinus issues  HPI  Patient presents for evaluation shortness of breath  Patient states that throughout today she has held that she has exertional dyspnea  She states that she took a shower earlier in the day and that she fell completely without afterwards  Given that she was having some difficulty breathing she contacted in the medical provider who then prescribed her a albuterol inhaler  She presents now for further evaluation  Patient states that she has more shortness of breath when she is moving about and is better at rest   She denies any chest pain/nausea/vomiting/fever or chills  Patient states that she was diagnosed with COVID approximately 2 weeks ago  She was recently started on Augmentin for concerns of a sinus infection  Patient denies any pulmonary or cardiac history  No history of blood clots in legs, lungs  No oral contraceptive  Unless otherwise specified:  - No language barrier    - History obtained from patient  - There are no limitations to the history obtained    - Previous charting was reviewed    PMH:   has a past medical history of Anorexia nervosa in remission, Anxiety, Back pain, Bipolar disorder ([address] ), Depression, Esophageal reflux (8/15/2013), Hypertension, Hypothyroid, Idiopathic intracranial hypertension, Lumbar degenerative disc disease (5/8/2014), Migraine, Obesity, Obsessive-compulsive disorder, Overactive bladder, Psychiatric disorder, Restless leg syndrome, controlled, Seasonal allergies, Suicide and self-inflicted injury ([address] ), and Transcranial Magnetic Stimulation (09/06/2021)  PSH:   has a past surgical history that includes Tonsillectomy; Sinus endoscopy; Dental surgery; and IR lumbar puncture (02/26/2019)  ROS:  Review of Systems   -   - 13 point ROS was performed and all are normal unless stated in the history above  - Nursing note reviewed  Vitals reviewed  - Orders placed by myself and/or advanced practitioner / resident  PE:   Vitals:    10/28/22 1911 10/28/22 1945 10/28/22 2000 10/28/22 2030   BP:   113/57 131/72   Pulse:  61 60 57   Resp:  18 17 21   Temp: 97 8 °F (36 6 °C)      TempSrc: Temporal      SpO2:  98% 99% 99%     Vitals reviewed by me  Patient is unsure sitting in bed  Saturating at 99% on room air  No conversational dyspnea  No extra work of breathing  She has good air movement all lung fields  No audible wheezing  Legs are large but appears nonedematous  Non erythematous  Unless otherwise specified above:    General: VS reviewed  Appears in NAD    Head: Normocephalic, atraumatic  Eyes: EOM-I  No exudate  ENT: Atraumatic external nose and ears  No malocclusion  No stridor  No drooling  Neck: No JVD  CV: No pallor noted  Lungs:   No tachypnea  No respiratory distress    Abdomen:  Soft, non-tender, non-distended    MSK:   No obvious deformity    Skin: Dry, intact  No obvious rash  Neuro: Awake, alert, GCS15, CN II-XII grossly intact  Speaking in full sentences  Motor grossly intact  Psychiatric/Behavioral: Appropriate mood and affect   Exam: deferred    Physical Exam     Procedures   A:  - Nursing note reviewed         Sara Garduno Criteria for PE    Flowsheet Row Most Recent Value   Corie Criteria for PE    Clinical signs and symptoms of DVT 0 Filed at: 10/28/2022 1914   PE is primary diagnosis or equally likely 0 Filed at: 10/28/2022 1914   HR >100 0 Filed at: 10/28/2022 1914   Immobilization at least 3 days or Surgery in the previous 4 weeks 0 Filed at: 10/28/2022 1914   Previous, objectively diagnosed PE or DVT 0 Filed at: 10/28/2022 1914   Hemoptysis 0 Filed at: 10/28/2022 1914   Malignancy with treatment within 6 months or palliative 0 Filed at: 10/28/2022 1914   Wells' Criteria Total 0 Filed at: 10/28/2022 [address] for PE    Flowsheet Row Most Recent Value   PERC Rule for PE    Age >=50 0 Filed at: 10/28/2022 1915   HR >=100 0 Filed at: 10/28/2022 1915   O2 Sat on room air < 95% 0 Filed at: 10/28/2022 1915   History of PE or DVT 0 Filed at: 10/28/2022 7883   Recent trauma or surgery 0 Filed at: 10/28/2022 1915   Hemoptysis 0 Filed at: 10/28/2022 1915   Exogenous estrogen 0 Filed at: 10/28/2022 1915   Unilateral leg swelling 0 Filed at: 10/28/2022 1915   PERC Rule for PE Results 0 Filed at: 10/28/2022 1915               ED Course as of 10/28/22 2053   Fri Oct 28, 2022   1906 Procedure Note: EKG  Date/Time: 10/28/22 7:06 PM   Interpreted by: Reji Lindsey  Indications / Diagnosis: SOB  ECG reviewed by me, the ED Provider: yes   The EKG demonstrates:  Rhythm: normal sinus  Intervals: normal intervals  Axis: normal axis  QRS/Blocks: normal QRS  ST Changes: No acute ST Changes, no STD/TAPAN  No diffuse elevations to indicate pericarditis  No coved ST elevations greater than 2mm with negative T waves in V1-3 to indicate concern for brugada  No biphasic T waves in V2, V3 to indicate Wellens (critical stenosis of LAD)  No elevation in aVR or deviation when compared to V1 (can be associated with ST depression in I,II, V4-6 when left main occlusion is present)         2000 Procalcitonin: <0 05     XR chest 1 view portable   ED Interpretation   No obvious consolidation on my interpretation        Orders Placed This Encounter   Procedures   • XR chest 1 view portable   • CBC and differential   • Protime-INR   • APTT   • Basic metabolic panel   • Lactic acid   • Procalcitonin   • HS Troponin 0hr (reflex protocol)   • Insert peripheral IV   • ECG 12 lead     Labs Reviewed   CBC AND DIFFERENTIAL - Abnormal       Result Value Ref Range Status    WBC 12 13 (*) 4 31 - 10 16 Thousand/uL Final    RBC 4 79  3 81 - 5 12 Million/uL Final    Hemoglobin 14 2  11 5 - 15 4 g/dL Final    Hematocrit 44 0  34 8 - 46 1 % Final    MCV 92  82 - 98 fL Final    MCH 29 6  26 8 - 34 3 pg Final    MCHC 32 3  31 4 - 37 4 g/dL Final    RDW 12 6  11 6 - 15 1 % Final    MPV 9 4  8 9 - 12 7 fL Final    Platelets 522  817 - 390 Thousands/uL Final    nRBC 0  /100 WBCs Final    Neutrophils Relative 53  43 - 75 % Final    Immat GRANS % 1  0 - 2 % Final    Lymphocytes Relative 36  14 - 44 % Final    Monocytes Relative 6  4 - 12 % Final    Eosinophils Relative 3  0 - 6 % Final    Basophils Relative 1  0 - 1 % Final    Neutrophils Absolute 6 48  1 85 - 7 62 Thousands/µL Final    Immature Grans Absolute 0 09  0 00 - 0 20 Thousand/uL Final    Lymphocytes Absolute 4 36  0 60 - 4 47 Thousands/µL Final    Monocytes Absolute 0 73  0 17 - 1 22 Thousand/µL Final    Eosinophils Absolute 0 33  0 00 - 0 61 Thousand/µL Final    Basophils Absolute 0 14 (*) 0 00 - 0 10 Thousands/µL Final   PROTIME-INR - Normal    Protime 13 5  11 6 - 14 5 seconds Final    INR 1 01  0 84 - 1 19 Final   APTT - Normal    PTT 30  23 - 37 seconds Final    Comment: Therapeutic Heparin Range =  60-90 seconds   LACTIC ACID, PLASMA - Normal    LACTIC ACID 1 0  0 5 - 2 0 mmol/L Final    Narrative:     Result may be elevated if tourniquet was used during collection  PROCALCITONIN TEST - Normal    Procalcitonin <0 05  <=0 25 ng/ml Final    Comment: Suspected Lower Respiratory Tract Infection (LRTI):  - LESS than or EQUAL to 0 25 ng/mL:   low likelihood for bacterial LRTI; antibiotics DISCOURAGED   - GREATER than 0 25 ng/mL:   increased likelihood for bacterial LRTI; antibiotics ENCOURAGED  Suspected Sepsis:  - Strongly consider initiating antibiotics in ALL UNSTABLE patients  - LESS than or EQUAL to 0 5 ng/mL:   low likelihood for bacterial sepsis; antibiotics DISCOURAGED    - GREATER than 0 5 ng/mL:   increased likelihood for bacterial sepsis; antibiotics ENCOURAGED   - GREATER than 2 ng/mL:   high risk for severe sepsis / septic shock; antibiotics strongly ENCOURAGED  Decisions on antibiotic use should not be based solely on Procalcitonin (PCT) levels  If PCT is low but uncertainty exists with stopping antibiotics, repeat PCT in 6-24 hours to confirm the low level  If antibiotics are administered (regardless if initial PCT was high or low), repeat PCT every 1-2 days to consider early antibiotic cessation (when GREATER than 80% decrease from the peak OR when PCT drops below designated cutoffs, whichever comes first), so long as the infection is NOT one that typically requires prolonged treatment durations (e g , bone/joint infections, endocarditis, Staph  aureus bacteremia)      Situations of FALSE-POSITIVE Procalcitonin values:  1) Newborns < 67 hours old  2) Massive stress from severe trauma / burns, major surgery, acute pancreatitis, cardiogenic / hemorrhagic shock, sickle cell crisis, or other organ perfusion abnormalities  3) Malaria and some Candidal infections  4) Treatment with agents that stimulate cytokines (e g , OKT3, anti-lymphocyte globulins, alemtuzumab, IL-2, granulocyte transfusion [NOT GCSFs])  5) Chronic renal disease causes elevated baseline levels (consider GREATER than 0 75 ng/mL as an abnormal cut-off); initiating HD/CRRT may cause transient decreases  6) Paraneoplastic syndromes from medullary thyroid or SCLC, some forms of vasculitis, and acute ynzil-hu-croh disease    Situations of FALSE-NEGATIVE Procalcitonin values:  1) Too early in clinical course for PCT to have reached its peak (may repeat in 6-24 hours to confirm low level)  2) Localized infection WITHOUT systemic (SIRS / sepsis) response (e g , an abscess, osteomyelitis, cystitis)  3) Mycobacteria (e g , Tuberculosis, MAC)  4) Cystic fibrosis exacerbations     HS TROPONIN I 0HR - Normal    hs TnI 0hr 3 "Refer to ACS Flowchart"- see link ng/L Final    Comment:                                              Initial (time 0) result  If >=50 ng/L, Myocardial injury suggested ;  Type of myocardial injury and treatment strategy  to be determined  If 5-49 ng/L, a delta result at 2 hours and or 4 hours will be needed to further evaluate  If <4 ng/L, and chest pain has been >3 hours since onset, patient may qualify for discharge based on the HEART score in the ED  If <5 ng/L and <3hours since onset of chest pain, a delta result at 2 hours will be needed to further evaluate  HS Troponin 99th Percentile URL of a Health Population=12 ng/L with a 95% Confidence Interval of 8-18 ng/L  Second Troponin (time 2 hours)  If calculated delta >= 20 ng/L,  Myocardial injury suggested ; Type of myocardial injury and treatment strategy to be determined  If 5-49 ng/L and the calculated delta is 5-19 ng/L, consult medical service for evaluation  Continue evaluation for ischemia on ecg and other possible etiology and repeat hs troponin at 4 hours  If delta is <5 ng/L at 2 hours, consider discharge based on risk stratification via the HEART score (if in ED), or CARMENCITA risk score in IP/Observation  HS Troponin 99th Percentile URL of a Health Population=12 ng/L with a 95% Confidence Interval of 8-18 ng/L  BASIC METABOLIC PANEL    Sodium 154  135 - 147 mmol/L Final    Potassium 4 5  3 5 - 5 3 mmol/L Final    Comment: Slightly Hemolyzed; Results May be Affected    Chloride 102  96 - 108 mmol/L Final    CO2 25  21 - 32 mmol/L Final    ANION GAP 11  4 - 13 mmol/L Final    BUN 23  5 - 25 mg/dL Final    Creatinine 0 88  0 60 - 1 30 mg/dL Final    Comment: Standardized to IDMS reference method    Glucose 83  65 - 140 mg/dL Final    Comment: If the patient is fasting, the ADA then defines impaired fasting glucose as > 100 mg/dL and diabetes as > or equal to 123 mg/dL    Specimen collection should occur prior to Sulfasalazine administration due to the potential for falsely depressed results  Specimen collection should occur prior to Sulfapyridine administration due to the potential for falsely elevated results  Calcium 9 6  8 3 - 10 1 mg/dL Final    eGFR 83  ml/min/1 73sq m Final    Narrative:     [place] guidelines for Chronic Kidney Disease (CKD):   •  Stage 1 with normal or high GFR (GFR > 90 mL/min/1 73 square meters)  •  Stage 2 Mild CKD (GFR = 60-89 mL/min/1 73 square meters)  •  Stage 3A Moderate CKD (GFR = 45-59 mL/min/1 73 square meters)  •  Stage 3B Moderate CKD (GFR = 30-44 mL/min/1 73 square meters)  •  Stage 4 Severe CKD (GFR = 15-29 mL/min/1 73 square meters)  •  Stage 5 End Stage CKD (GFR <15 mL/min/1 73 square meters)  Note: GFR calculation is accurate only with a steady state creatinine         Final Diagnosis:  1  Dyspnea        P:  - patient presents for evaluate of shortness of breath  Possibly secondary to her COVID infection  Will evaluate for cardiac pathology, a chest x-ray for pneumonia  I discussed that the patient is already on Augmentin which would adequately change treat her pneumonia if she were to have any  We will also provide breathing treatment for symptomatic relief   -laboratory analysis was unremarkable  EKG without ischemic changes  Troponin normal   Reviewed all results with patient  Return precautions reviewed      Medications   albuterol (PROVENTIL HFA,VENTOLIN HFA) inhaler 2 puff (has no administration in time range)   ipratropium-albuterol (DUO-NEB) 0 5-2 5 mg/3 mL inhalation solution 3 mL (3 mL Nebulization Given 10/28/22 1922)   sodium chloride 0 9 % bolus 1,000 mL (0 mL Intravenous Stopped 10/28/22 2043)     Time reflects when diagnosis was documented in both MDM as applicable and the Disposition within this note     Time User Action Codes Description Comment    10/28/2022  8:52 PM Georgette Parker Add [R06 00] Dyspnea       ED Disposition     ED Disposition   Discharge Condition   Stable    Date/Time   Fri Oct 28, 2022  8:52 PM    Comment   Rosalinda Grande discharge to home/self care  Follow-up Information     Follow up With Specialties Details Why Contact Info    Taylor Armstrong MD [place] [id number]  [phone]          Patient's Medications   Discharge Prescriptions    No medications on file     No discharge procedures on file  Prior to Admission Medications   Prescriptions Last Dose Informant Patient Reported? Taking? Cholecalciferol (VITAMIN D3) 5000 units CAPS  Self Yes No   Sig: Take 5,000 Units by mouth daily   Emgality 120 MG/ML SOAJ   No No   Sig: INJECT 120 MG UNDER THE SKIN EVERY 30 DAYS  LORazepam (ATIVAN) 0 5 mg tablet   Yes No   Patient not taking: No sig reported   LORazepam (ATIVAN) 1 mg tablet   Yes No   Sig: Take 1 mg by mouth 3 (three) times a day as needed for anxiety   Patient not taking: No sig reported   Linzess 290 MCG CAPS   No No   Sig: TAKE 1 CAPSULE BY MOUTH EVERY DAY   MAGNESIUM PO   Yes No   Sig: Take 1 tablet by mouth 2 (two) times a day    Patient not taking: No sig reported   Multiple Vitamin (MULTIVITAMIN) capsule  Self Yes No   Sig: Take 1 capsule by mouth daily   Nurtec 75 MG TBDP   No No   Sig: TAKE 75 MG BY MOUTH EVERY OTHER DAY   IF THERE IS A MIGRAINE ON A NON-NURTEC DAY, CAN TAKE A DOSE AND SKIP THE FOLLOWING DAY   SUMAtriptan (IMITREX) 100 mg tablet   No No   Si tab as needed for migraine, can repeat once in 2 hours, max 200mg in 24 hours     Vraylar 6 MG capsule  Self Yes No   Sig: Take 6 mg by mouth daily daily   Vyvanse 50 MG capsule  Self Yes No   Sig: Take 50 mg by mouth daily   albuterol (ProAir HFA) 90 mcg/act inhaler   No No   Sig: Inhale 2 puffs every 6 (six) hours as needed for wheezing   amoxicillin-clavulanate (Augmentin) 875-125 mg per tablet   No No   Sig: Take 1 tablet by mouth every 12 (twelve) hours for 10 days   buPROPion (WELLBUTRIN XL) 300 mg 24 hr tablet  Self Yes No   Sig: Take 300 mg by mouth daily    celecoxib (CeleBREX) 200 mg capsule   No No   Sig: TAKE 1 CAPSULE BY MOUTH TWICE A DAY   clomiPRAMINE (ANAFRANIL) 50 mg capsule   Yes No   Sig: Take by mouth TAKE 2 CAPSULES IN AM AND 3 CAPSULES IN PM   furosemide (LASIX) 10 mg/mL oral solution   Yes No   Sig: Take 60 mg by mouth daily   furosemide (LASIX) 40 mg tablet   No No   Sig: TAKE 1/2 TABLET BY MOUTH DAILY OR 1 AND 1/2 TABS IF WEIGHT GAIN OF 3 POUNDS IN ONE DAY   gabapentin (NEURONTIN) 600 MG tablet  Self Yes No   Sig: TAKE 1/2 TABLET DAILY IN AM AND 1 AND 1/2 TABLET AT BEDTIME   hydrocortisone-acetic acid (VOSOL-HC) otic solution  Self No No   Sig: Administer 3 drops into both ears every 6 (six) hours   indomethacin (INDOCIN) 50 mg capsule   No No   Sig: Take 1 capsule (50 mg total) by mouth 3 (three) times a day with meals for 10 days   Patient not taking: Reported on 9/15/2022   levothyroxine 125 mcg tablet   No No   Sig: TAKE 1 TABLET BY MOUTH EVERY DAY IN THE MORNING   loratadine (CLARITIN) 10 mg tablet  Self Yes No   Sig: Take 10 mg by mouth daily   metFORMIN (GLUCOPHAGE) 500 mg tablet   No No   Sig: TAKE 1 TABLET BY MOUTH EVERY DAY WITH BREAKFAST   omeprazole (PriLOSEC) 20 mg delayed release capsule   No No   Sig: TAKE 1 CAPSULE BY MOUTH TWICE A DAY   propranolol (INDERAL) 10 mg tablet   Yes No   Sig: Take 10 mg by mouth 2 (two) times a day   rOPINIRole (REQUIP) 4 mg tablet   No No   Sig: TAKE 1 TABLET BY MOUTH 2 TIMES A DAY     semaglutide, 1 mg/dose, (Ozempic, 1 MG/DOSE,) 4 MG/3ML SOPN injection pen   No No   Sig: Inject 0 75 mL (1 mg total) under the skin once a week   sertraline (ZOLOFT) 100 mg tablet  Self Yes No   Sig: Take 100 mg by mouth daily    spironolactone (ALDACTONE) 50 mg tablet   No No   Sig: TAKE 1 TABLET BY MOUTH EVERY DAY   sucralfate (CARAFATE) 1 g tablet   No No   Si cap TID if needed 30-60 minutes before Indocin   Patient not taking: No sig reported   topiramate (TOPAMAX) 100 mg tablet   No No   Sig: TAKE 1 TABLET BY MOUTH EVERY DAY      Facility-Administered Medications Last Administration Doses Remaining   Botulinum Toxin Type A SOLR 200 Units 4/1/2021  2:53 PM    Botulinum Toxin Type A SOLR 200 Units 7/28/2021 12:11 PM           Portions of the record may have been created with voice recognition software  Occasional wrong word or "sound a like" substitutions may have occurred due to the inherent limitations of voice recognition software  Read the chart carefully and recognize, using context, where substitutions have occurred      Electronically signed by:  MD Rosalio Hull MD  10/28/22 2053

## 2022-10-28 NOTE — PROGRESS NOTES
Patient called complaining of chest congestion and dyspnea  COVID-19+ on 10/15/22  Patient called in on 10/24 for concern of worsening sinus pain/pressure hence was treated for presumed acute sinus infection, may complete a 10 day course

## 2022-10-28 NOTE — PROGRESS NOTES
[facility]  coding opportunities       Chart reviewed, no opportunity found:   [address]        Patients Insurance     Medicare Insurance: [employer]

## 2022-10-31 ENCOUNTER — OFFICE VISIT (OUTPATIENT)
Dept: FAMILY MEDICINE CLINIC | Facility: CLINIC | Age: 37
End: 2022-10-31

## 2022-10-31 DIAGNOSIS — U07.1 DYSPNEA DUE TO COVID-19: Primary | ICD-10-CM

## 2022-10-31 DIAGNOSIS — R06.00 DYSPNEA DUE TO COVID-19: Primary | ICD-10-CM

## 2022-10-31 DIAGNOSIS — I10 PRIMARY HYPERTENSION: Chronic | ICD-10-CM

## 2022-10-31 LAB
ATRIAL RATE: 59 BPM
P AXIS: 50 DEGREES
PR INTERVAL: 184 MS
QRS AXIS: 25 DEGREES
QRSD INTERVAL: 96 MS
QT INTERVAL: 428 MS
QTC INTERVAL: 423 MS
T WAVE AXIS: 27 DEGREES
VENTRICULAR RATE: 59 BPM

## 2022-10-31 NOTE — PATIENT INSTRUCTIONS
Please start monitoring your blood pressures twice a day  Once in the morning prior to all medication administration and once in the evening time  Please call office if you notice blood pressures ranging 160-180s /90s- 100  If blood pressures are ranging 140s-150s please schedule an appointment for 3 weeks from now

## 2022-11-01 VITALS
RESPIRATION RATE: 14 BRPM | TEMPERATURE: 97.9 F | HEIGHT: 67 IN | OXYGEN SATURATION: 99 % | DIASTOLIC BLOOD PRESSURE: 82 MMHG | WEIGHT: 293 LBS | BODY MASS INDEX: 45.99 KG/M2 | HEART RATE: 74 BPM | SYSTOLIC BLOOD PRESSURE: 136 MMHG

## 2022-11-01 NOTE — PROGRESS NOTES
Assessment/Plan:    No problem-specific Assessment & Plan notes found for this encounter  Diagnoses and all orders for this visit:    Dyspnea due to COVID-19  - Patient with continued SOB in setting of COVID-19 infection (+ on 10/15/22), only received symptomatic treatment  - Continue with albuterol inhaler and will assess if patient may d/c in the next coming weeks    Primary Hypertension  - Please start monitoring your blood pressures twice a day  Once in the morning prior to all medication administration and once in the evening time  - Please call office if you notice blood pressures ranging 160-180s /90s- 100  Subjective:      Patient ID: Nadine Lorenz is a 44 y o  female  Patient returns for follow-up visit after experiencing worsening dyspnea in setting of COVID-19 infection, as well as, sinus infection  Patient was prescribed albuterol inhaler on 10/28/22, however, she reports that pharmacy was so back logged that they could not fill script until the following morning  Hence, patient went to the ED and did received an albuterol nebulizer treatment at that time with some relief of symptoms  Patient was ultimately discharged home from ED with inhaler and has been using it 2x daily  She reports about 50% improvement of breathing at this time      The following portions of the patient's history were reviewed and updated as appropriate: allergies, current medications, past family history, past medical history, past social history, past surgical history and problem list     Review of Systems   Constitutional: Negative for chills and fever  Respiratory: Positive for shortness of breath  Cardiovascular: Negative for chest pain  Gastrointestinal: Negative for nausea and vomiting  Neurological: Negative for headaches           Objective:      /82   Pulse 74   Temp 97 9 °F (36 6 °C)   Resp 14   Ht 5' 7" (1 702 m)   Wt (!) 176 kg (388 lb)   LMP 10/14/2022   SpO2 99%   BMI 60 77 kg/m²          Physical Exam  Constitutional:       Appearance: Normal appearance  HENT:      Head: Normocephalic and atraumatic  Right Ear: External ear normal       Left Ear: External ear normal       Nose: Nose normal    Eyes:      Conjunctiva/sclera: Conjunctivae normal    Cardiovascular:      Rate and Rhythm: Normal rate and regular rhythm  Heart sounds: Normal heart sounds  Pulmonary:      Effort: Pulmonary effort is normal       Breath sounds: Normal breath sounds  No wheezing  Abdominal:      General: Bowel sounds are normal       Palpations: Abdomen is soft  Neurological:      Mental Status: She is alert and oriented to person, place, and time

## 2022-11-04 ENCOUNTER — TELEPHONE (OUTPATIENT)
Dept: FAMILY MEDICINE CLINIC | Facility: CLINIC | Age: 37
End: 2022-11-04

## 2022-11-04 DIAGNOSIS — K64.8 HEMORRHOIDS, INTERNAL, WITH BLEEDING: ICD-10-CM

## 2022-11-04 DIAGNOSIS — K64.8 HEMORRHOIDS, INTERNAL, WITH BLEEDING: Primary | ICD-10-CM

## 2022-11-04 RX ORDER — HYDROCORTISONE ACETATE 25 MG/1
25 SUPPOSITORY RECTAL 2 TIMES DAILY
Qty: 12 SUPPOSITORY | Refills: 0 | Status: SHIPPED | OUTPATIENT
Start: 2022-11-04

## 2022-11-04 RX ORDER — HYDROCORTISONE ACETATE 25 MG/1
25 SUPPOSITORY RECTAL 2 TIMES DAILY
Qty: 12 SUPPOSITORY | Refills: 0 | Status: SHIPPED | OUTPATIENT
Start: 2022-11-04 | End: 2022-11-04 | Stop reason: SDUPTHER

## 2022-11-04 NOTE — TELEPHONE ENCOUNTER
Bleeding when pooping; blood in toilet and when wiping since Tuesday, 11/1   Has had hemorrhoids in past and has used suppositories Anusol (prev recommended by gastroenterology)  1st time - last fall and 2nd - this past spring   Is wondering if she should reach out to gastro or next steps

## 2022-11-04 NOTE — PROGRESS NOTES
Patient reached out to PCP with concern of recurrence of hemorrhoids with rectal bleeding since 11/8/22  Patient reports she has tried OTC meds without relief of symptoms  PCP has agreed to fill hydrocortisone suppository and counseled patient that if symptoms do not improve by Monday, that she should reach out to Gastroenterology provider for further evaluation

## 2022-11-14 LAB
CREAT ?TM UR-SCNC: 40 UMOL/L
MICROALBUMIN/CREAT UR: <30 MG/G{CREAT}

## 2022-11-15 DIAGNOSIS — K59.09 CHRONIC CONSTIPATION: ICD-10-CM

## 2022-11-15 RX ORDER — LINACLOTIDE 290 UG/1
CAPSULE, GELATIN COATED ORAL
Qty: 30 CAPSULE | Refills: 5 | Status: SHIPPED | OUTPATIENT
Start: 2022-11-15

## 2022-11-16 DIAGNOSIS — E66.01 MORBID OBESITY (HCC): ICD-10-CM

## 2022-11-16 DIAGNOSIS — R73.01 IFG (IMPAIRED FASTING GLUCOSE): ICD-10-CM

## 2022-11-16 NOTE — TELEPHONE ENCOUNTER
Patient calling on prescription  Ozempic sent in on 11/15/2022 to [facility] pharmacy in [facility]

## 2022-11-17 DIAGNOSIS — E66.01 MORBID OBESITY (HCC): ICD-10-CM

## 2022-11-17 DIAGNOSIS — R73.01 IFG (IMPAIRED FASTING GLUCOSE): ICD-10-CM

## 2022-11-17 RX ORDER — SEMAGLUTIDE 1.34 MG/ML
1 INJECTION, SOLUTION SUBCUTANEOUS WEEKLY
Qty: 3 ML | Refills: 1 | Status: SHIPPED | OUTPATIENT
Start: 2022-11-17 | End: 2022-11-19 | Stop reason: SDUPTHER

## 2022-11-19 ENCOUNTER — NURSE TRIAGE (OUTPATIENT)
Dept: OTHER | Facility: OTHER | Age: 37
End: 2022-11-19

## 2022-11-19 DIAGNOSIS — E66.01 MORBID OBESITY (HCC): ICD-10-CM

## 2022-11-19 DIAGNOSIS — R73.01 IFG (IMPAIRED FASTING GLUCOSE): ICD-10-CM

## 2022-11-19 RX ORDER — SEMAGLUTIDE 1.34 MG/ML
1 INJECTION, SOLUTION SUBCUTANEOUS WEEKLY
Qty: 3 ML | Refills: 1 | Status: SHIPPED | OUTPATIENT
Start: 2022-11-19 | End: 2022-11-22

## 2022-11-19 NOTE — TELEPHONE ENCOUNTER
TC on call provider, since encounter/last ordering provider comes up as Endo; stated ok to sent to different pharmacy that her medication in stock  Medication sent; receipt confirmed by pharmacy

## 2022-11-19 NOTE — TELEPHONE ENCOUNTER
Regarding: Med refill - ozempic  ----- Message from Bree Camara sent at 11/19/2022  5:28 PM EST -----  " I was due for my Ozempic yesterday   Per [facility] they don't have it in stock and they refuse to transfer it to [facility] in [facility] the only pharmacy near me that has it in stock "

## 2022-11-19 NOTE — TELEPHONE ENCOUNTER
Reason for Disposition  • [1] Caller has URGENT medicine question about med that PCP or specialist prescribed AND [2] triager unable to answer question    Answer Assessment - Initial Assessment Questions  1  NAME of MEDICATION: "What medicine are you calling about?"      Ozempic  2  QUESTION: "What is your question?" (e g , medication refill, side effect)      Patient needed Ozempic sent to [facility]; medication is on backorder at [facility]  Medication ordered by weight management office      Protocols used: MEDICATION QUESTION CALL-ADULT-

## 2022-11-21 ENCOUNTER — TELEPHONE (OUTPATIENT)
Dept: PULMONOLOGY | Facility: CLINIC | Age: 37
End: 2022-11-21

## 2022-11-22 ENCOUNTER — TELEPHONE (OUTPATIENT)
Dept: ENDOCRINOLOGY | Facility: CLINIC | Age: 37
End: 2022-11-22

## 2022-11-22 DIAGNOSIS — E66.01 MORBID OBESITY (HCC): Primary | ICD-10-CM

## 2022-11-22 DIAGNOSIS — R73.03 PREDIABETES: ICD-10-CM

## 2022-11-22 NOTE — TELEPHONE ENCOUNTER
Patient called regarding a refill of the ozempic  She said that their is a national shortage of this medication and wants to know if there is something else she could take that is similar or what is going to happen

## 2022-11-26 DIAGNOSIS — G93.2 IDIOPATHIC INTRACRANIAL HYPERTENSION: ICD-10-CM

## 2022-11-28 DIAGNOSIS — K21.9 GASTROESOPHAGEAL REFLUX DISEASE WITHOUT ESOPHAGITIS: ICD-10-CM

## 2022-11-28 RX ORDER — FUROSEMIDE 40 MG/1
TABLET ORAL
Qty: 90 TABLET | Refills: 1 | Status: SHIPPED | OUTPATIENT
Start: 2022-11-28

## 2022-11-28 RX ORDER — OMEPRAZOLE 20 MG/1
20 CAPSULE, DELAYED RELEASE ORAL 2 TIMES DAILY
Qty: 180 CAPSULE | Refills: 1 | Status: SHIPPED | OUTPATIENT
Start: 2022-11-28

## 2022-11-30 DIAGNOSIS — G43.709 CHRONIC MIGRAINE WITHOUT AURA WITHOUT STATUS MIGRAINOSUS, NOT INTRACTABLE: ICD-10-CM

## 2022-11-30 RX ORDER — CELECOXIB 200 MG/1
CAPSULE ORAL
Qty: 60 CAPSULE | Refills: 4 | Status: SHIPPED | OUTPATIENT
Start: 2022-11-30

## 2022-12-09 ENCOUNTER — OFFICE VISIT (OUTPATIENT)
Dept: URGENT CARE | Facility: CLINIC | Age: 37
End: 2022-12-09

## 2022-12-09 ENCOUNTER — TELEPHONE (OUTPATIENT)
Dept: URGENT CARE | Facility: CLINIC | Age: 37
End: 2022-12-09

## 2022-12-09 ENCOUNTER — APPOINTMENT (OUTPATIENT)
Dept: RADIOLOGY | Facility: CLINIC | Age: 37
End: 2022-12-09

## 2022-12-09 VITALS
SYSTOLIC BLOOD PRESSURE: 121 MMHG | WEIGHT: 293 LBS | OXYGEN SATURATION: 98 % | HEIGHT: 67 IN | HEART RATE: 88 BPM | BODY MASS INDEX: 45.99 KG/M2 | RESPIRATION RATE: 18 BRPM | DIASTOLIC BLOOD PRESSURE: 79 MMHG | TEMPERATURE: 97.9 F

## 2022-12-09 DIAGNOSIS — S61.451A DOG BITE OF RIGHT HAND WITHOUT COMPLICATION, INITIAL ENCOUNTER: Primary | ICD-10-CM
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
## 2022-12-09 DIAGNOSIS — S61.451A DOG BITE OF RIGHT HAND WITHOUT COMPLICATION, INITIAL ENCOUNTER: ICD-10-CM
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
## 2022-12-09 RX ORDER — AMOXICILLIN AND CLAVULANATE POTASSIUM 875; 125 MG/1; MG/1
1 TABLET, FILM COATED ORAL EVERY 12 HOURS SCHEDULED
Qty: 14 TABLET | Refills: 0 | Status: SHIPPED | OUTPATIENT
Start: 2022-12-09 | End: 2022-12-16

## 2022-12-09 RX ORDER — GINSENG 100 MG
1 CAPSULE ORAL ONCE
Status: COMPLETED | OUTPATIENT
Start: 2022-12-09 | End: 2022-12-09

## 2022-12-09 RX ADMIN — Medication 1 SMALL APPLICATION: at 10:26

## 2022-12-09 NOTE — TELEPHONE ENCOUNTER
Discussed with patient x-ray findings which include no fracture but a very minimal amount of subcutaneous gas  Discussed with patient to monitor for any worsening signs of infection and proceed immediately to the ED if that were to occur  Pt to start antibiotics today

## 2022-12-09 NOTE — PROGRESS NOTES
[address]        NAME: Kobi Brooks is a 44 y o  female  : 1985    MRN: [id number]  DATE: 2022  TIME: 11:26 AM    Assessment and Plan   Dog bite of right hand without complication, initial encounter [S61 451A, W54  0XXA]  1  Dog bite of right hand without complication, initial encounter  amoxicillin-clavulanate (AUGMENTIN) 875-125 mg per tablet    XR finger right second digit-index    bacitracin topical ointment 1 small application        xr- no acute osseous abnormality, small amount of subcutaneous gas  Wounds cleansed  Bacitracin applied  Will start augmentin  Pt tdap is up to date  Animal bite form completed  Patient Instructions   Keep wounds clean and dry  May apply bacitracin to wounds  Monitor for any signs of infection such as increased redness, funny color for a smelling drainage, red streaking going up the arm or severe pain  Tylenol and motrin for pain  May apply ice for pain  Your tdap is up todate  No fracture noted on xray  Pain and limited range of motion related to swelling  You have been prescribed an antibiotic  Take antibiotic as directed for the full duration  Do not stop the antibiotics just because you are feeling better  Side effects of antibiotics include diarrhea  Eat yogurt or take a probiotic or acidophilus tablet while taking this medication to help prevent diarrhea and replenish good gut bacteria  Follow up with PCP in 3-5 days  Proceed to  ER if symptoms worsen  Chief Complaint     Chief Complaint   Patient presents with   • Animal Bite     Right hand from her dog   Happened last night  Area is red, swollen pointer finger multiple bites  last menstrual cycle was three weeks ago  History of Present Illness       Patient is a 44year old female who presents to the office today for dog bite   States her dog took papertowels last night around 11pm and she attempted to get them back when her dog bit her in the right index finger  Used peroxide to clean the wounds and soap and water  Denies blood thinners  tdap up todate  Dogs tetanus up to date  Review of Systems   Review of Systems   Constitutional: Negative for chills and fever  Musculoskeletal: Positive for joint swelling  Skin: Positive for wound  Neurological: Negative for weakness and numbness  All other systems reviewed and are negative  Current Medications       Current Outpatient Medications:   •  amoxicillin-clavulanate (AUGMENTIN) 875-125 mg per tablet, Take 1 tablet by mouth every 12 (twelve) hours for 7 days, Disp: 14 tablet, Rfl: 0  •  celecoxib (CeleBREX) 200 mg capsule, TAKE 1 CAPSULE BY MOUTH TWICE A DAY, Disp: 60 capsule, Rfl: 4  •  Cholecalciferol (VITAMIN D3) 5000 units CAPS, Take 5,000 Units by mouth daily, Disp: , Rfl:   •  clomiPRAMINE (ANAFRANIL) 50 mg capsule, Take by mouth TAKE 2 CAPSULES IN AM AND 3 CAPSULES IN PM, Disp: , Rfl:   •  Emgality 120 MG/ML SOAJ, INJECT 120 MG UNDER THE SKIN EVERY 30 DAYS , Disp: 1 mL, Rfl: 5  •  furosemide (LASIX) 10 mg/mL oral solution, Take 60 mg by mouth daily, Disp: , Rfl:   •  furosemide (LASIX) 40 mg tablet, TAKE 1/2 TABLET BY MOUTH DAILY OR 1 AND 1/2 TABS IF WEIGHT GAIN OF 3 POUNDS IN ONE DAY, Disp: 90 tablet, Rfl: 1  •  gabapentin (NEURONTIN) 600 MG tablet, TAKE 1/2 TABLET DAILY IN AM AND 1 AND 1/2 TABLET AT BEDTIME, Disp: , Rfl:   •  hydrocortisone-acetic acid (VOSOL-HC) otic solution, Administer 3 drops into both ears every 6 (six) hours, Disp: 10 mL, Rfl: 0  •  Insulin Pen Needle 32G X 6 MM MISC, Use once daily as directed, Disp: 90 each, Rfl: 0  •  levothyroxine 125 mcg tablet, TAKE 1 TABLET BY MOUTH EVERY DAY IN THE MORNING, Disp: 90 tablet, Rfl: 1  •  Linzess 290 MCG CAPS, TAKE 1 CAPSULE BY MOUTH EVERY DAY, Disp: 30 capsule, Rfl: 5  •  liraglutide (VICTOZA) injection, Inject 0 6 mg subcutaneously once per day for the first week   Increase in weekly intervals by 0 6 mg until a dose of 1 8 mg is reached, Disp: 9 mL, Rfl: 1  •  loratadine (CLARITIN) 10 mg tablet, Take 10 mg by mouth daily, Disp: , Rfl:   •  LORazepam (ATIVAN) 0 5 mg tablet, , Disp: , Rfl:   •  LORazepam (ATIVAN) 1 mg tablet, Take 1 mg by mouth 3 (three) times a day as needed for anxiety, Disp: , Rfl:   •  metFORMIN (GLUCOPHAGE) 500 mg tablet, TAKE 1 TABLET BY MOUTH EVERY DAY WITH BREAKFAST, Disp: 90 tablet, Rfl: 0  •  Multiple Vitamin (MULTIVITAMIN) capsule, Take 1 capsule by mouth daily, Disp: , Rfl:   •  Nurtec 75 MG TBDP, TAKE 75 MG BY MOUTH EVERY OTHER DAY   IF THERE IS A MIGRAINE ON A NON-NURTEC DAY, CAN TAKE A DOSE AND SKIP THE FOLLOWING DAY, Disp: 16 tablet, Rfl: 7  •  omeprazole (PriLOSEC) 20 mg delayed release capsule, Take 1 capsule (20 mg total) by mouth 2 (two) times a day, Disp: 180 capsule, Rfl: 1  •  propranolol (INDERAL) 10 mg tablet, Take 10 mg by mouth 2 (two) times a day, Disp: , Rfl:   •  rOPINIRole (REQUIP) 4 mg tablet, TAKE 1 TABLET BY MOUTH 2 TIMES A DAY , Disp: 180 tablet, Rfl: 1  •  sertraline (ZOLOFT) 100 mg tablet, Take 100 mg by mouth daily , Disp: , Rfl: 0  •  spironolactone (ALDACTONE) 50 mg tablet, TAKE 1 TABLET BY MOUTH EVERY DAY, Disp: 90 tablet, Rfl: 1  •  SUMAtriptan (IMITREX) 100 mg tablet, 1 tab as needed for migraine, can repeat once in 2 hours, max 200mg in 24 hours  , Disp: 12 tablet, Rfl: 3  •  topiramate (TOPAMAX) 100 mg tablet, TAKE 1 TABLET BY MOUTH EVERY DAY, Disp: 90 tablet, Rfl: 3  •  Vraylar 6 MG capsule, Take 6 mg by mouth daily daily, Disp: , Rfl:   •  Vyvanse 50 MG capsule, Take 50 mg by mouth daily, Disp: , Rfl:     Current Facility-Administered Medications:   •  bacitracin topical ointment 1 small application, 1 small application, Topical, Once, [employer], LEIGHA  •  Botulinum Toxin Type A SOLR 200 Units, 200 Units, Injection, Q3 Months, Arlet Doyle PA-C, 200 Units at 04/01/21 1453  •  Botulinum Toxin Type A SOLR 200 Units, 200 Units, Injection, Q3 Months, Marylen Case, MD, 200 Units at 07/28/21 1211    Current Allergies     Allergies as of 12/09/2022 - Reviewed 12/09/2022   Allergen Reaction Noted   • Doxycycline  03/28/2019   • Other  02/26/2018            The following portions of the patient's history were reviewed and updated as appropriate: allergies, current medications, past family history, past medical history, past social history, past surgical history and problem list      Past Medical History:   Diagnosis Date   • Anorexia nervosa in remission    • Anxiety    • Back pain    • Bipolar disorder ([facility] [address] )    • Depression    • Esophageal reflux 8/15/2013   • Hypertension    • Hypothyroid    • Idiopathic intracranial hypertension    • Lumbar degenerative disc disease 5/8/2014   • Migraine    • Obesity    • Obsessive-compulsive disorder    • Overactive bladder    • Psychiatric disorder    • Restless leg syndrome, controlled    • Seasonal allergies    • Suicide and self-inflicted injury [facility])    • Transcranial Magnetic Stimulation 09/06/2021       Past Surgical History:   Procedure Laterality Date   • DENTAL SURGERY      wisdom teeth-at [age] years   Other surgery dental extraxtion of bone spur (10 years ago)   • IR LUMBAR PUNCTURE  02/26/2019   • SINUS ENDOSCOPY     • TONSILLECTOMY         Family History   Problem Relation Age of Onset   • Mental illness Mother    • Depression Mother    • Suicide Attempts Mother    • Hypertension Mother    • Diabetes Mother    • Other Mother         bicuspid aortic valve   • Hypertension Father    • Hypothyroidism Father    • Hypertension Brother    • Cancer Maternal Grandmother    • Heart disease Maternal Grandmother    • Stroke Maternal Grandmother    • Breast cancer Maternal Grandmother    • Skin cancer Maternal Grandmother    • Pneumonia Maternal Grandfather    • Cancer Maternal Grandfather    • Cancer Paternal Grandmother    • Pneumonia Paternal Grandfather    • Arthritis Family    • Breast cancer Family    • Osteopenia Family    • Osteoporosis Family    • Hypertension Family    • Transient ischemic attack Family          Medications have been verified  Objective   /79   Pulse 88   Temp 97 9 °F (36 6 °C)   Resp 18   Ht 5' 7" (1 702 m)   Wt (!) 177 kg (390 lb 12 8 oz)   SpO2 98%   BMI 61 21 kg/m²        Physical Exam     Physical Exam  Vitals and nursing note reviewed  Constitutional:       General: She is not in acute distress  Appearance: Normal appearance  She is obese  She is not ill-appearing or toxic-appearing  Cardiovascular:      Rate and Rhythm: Normal rate and regular rhythm  Pulses: Normal pulses  Pulmonary:      Effort: Pulmonary effort is normal    Musculoskeletal:         General: Swelling, tenderness and signs of injury present  Hands:       Comments: Radial pulse +2 and equal bilaterally  Ecchymosis along bite wounds  Swelling noted near MCP of 2nd digit  No crepitus noted  ROM limited of 2nd digit r/t swelling and pain  Sensation in tact  Cap refill <2 seconds       Skin:     General: Skin is warm  Capillary Refill: Capillary refill takes less than 2 seconds  Neurological:      General: No focal deficit present  Mental Status: She is alert and oriented to person, place, and time

## 2022-12-09 NOTE — PATIENT INSTRUCTIONS
Keep wounds clean and dry  May apply bacitracin to wounds  Monitor for any signs of infection such as increased redness, funny color for a smelling drainage, red streaking going up the arm or severe pain  Tylenol and motrin for pain  May apply ice for pain  Your tdap is up todate  No fracture noted on xray  Pain and limited range of motion related to swelling  You have been prescribed an antibiotic  Take antibiotic as directed for the full duration  Do not stop the antibiotics just because you are feeling better  Side effects of antibiotics include diarrhea  Eat yogurt or take a probiotic or acidophilus tablet while taking this medication to help prevent diarrhea and replenish good gut bacteria

## 2022-12-14 ENCOUNTER — TELEPHONE (OUTPATIENT)
Dept: FAMILY MEDICINE CLINIC | Facility: CLINIC | Age: 37
End: 2022-12-14

## 2022-12-16 ENCOUNTER — TELEPHONE (OUTPATIENT)
Dept: FAMILY MEDICINE CLINIC | Facility: CLINIC | Age: 37
End: 2022-12-16

## 2022-12-16 DIAGNOSIS — F50.81 BINGE-EATING DISORDER, SEVERE: ICD-10-CM

## 2022-12-16 DIAGNOSIS — R73.01 IFG (IMPAIRED FASTING GLUCOSE): ICD-10-CM

## 2022-12-16 DIAGNOSIS — R63.5 ABNORMAL WEIGHT GAIN: ICD-10-CM

## 2022-12-16 DIAGNOSIS — E66.01 MORBID (SEVERE) OBESITY DUE TO EXCESS CALORIES (HCC): ICD-10-CM

## 2022-12-16 DIAGNOSIS — G47.33 OBSTRUCTIVE SLEEP APNEA: Primary | ICD-10-CM

## 2022-12-16 DIAGNOSIS — G43.709 CHRONIC MIGRAINE WITHOUT AURA WITHOUT STATUS MIGRAINOSUS, NOT INTRACTABLE: ICD-10-CM

## 2022-12-16 NOTE — TELEPHONE ENCOUNTER
Charo Marie in Neurology requested a sleep medicine referral from Dr Latha Morales as Mari Steele PCP  Although a referral has been placed for sleep medicine, it was done by Neurology  Bereket Mistry has an upcoming 12/19/22 appointment with sleep medicine  [facility] requires PCP to place this referral in order for her visit and treatment to be covered     Please place the following referral per Neurology's request:   Sleep Medicine Referral - Consult and treat (not a study)   Diagnosis code: [id number]

## 2022-12-19 RX ORDER — GALCANEZUMAB 120 MG/ML
INJECTION, SOLUTION SUBCUTANEOUS
Qty: 3 ML | Refills: 1 | Status: SHIPPED | OUTPATIENT
Start: 2022-12-19

## 2022-12-19 RX ORDER — RIMEGEPANT SULFATE 75 MG/75MG
75 TABLET, ORALLY DISINTEGRATING ORAL EVERY OTHER DAY
Qty: 16 TABLET | Refills: 1 | Status: SHIPPED | OUTPATIENT
Start: 2022-12-19

## 2022-12-20 ENCOUNTER — TELEPHONE (OUTPATIENT)
Dept: NEPHROLOGY | Facility: CLINIC | Age: 37
End: 2022-12-20

## 2022-12-21 ENCOUNTER — OFFICE VISIT (OUTPATIENT)
Dept: FAMILY MEDICINE CLINIC | Facility: CLINIC | Age: 37
End: 2022-12-21

## 2022-12-21 VITALS
OXYGEN SATURATION: 100 % | DIASTOLIC BLOOD PRESSURE: 70 MMHG | SYSTOLIC BLOOD PRESSURE: 126 MMHG | HEIGHT: 67 IN | HEART RATE: 70 BPM | WEIGHT: 293 LBS | BODY MASS INDEX: 45.99 KG/M2 | TEMPERATURE: 97.5 F

## 2022-12-21 DIAGNOSIS — J32.2 CHRONIC ETHMOIDAL SINUSITIS: ICD-10-CM

## 2022-12-21 DIAGNOSIS — K59.09 CHRONIC CONSTIPATION: ICD-10-CM

## 2022-12-21 DIAGNOSIS — K21.9 GASTROESOPHAGEAL REFLUX DISEASE WITHOUT ESOPHAGITIS: ICD-10-CM

## 2022-12-21 DIAGNOSIS — I10 PRIMARY HYPERTENSION: Chronic | ICD-10-CM

## 2022-12-21 DIAGNOSIS — E03.9 ACQUIRED HYPOTHYROIDISM: Primary | ICD-10-CM

## 2022-12-21 NOTE — PROGRESS NOTES
Assessment/Plan:    No problem-specific Assessment & Plan notes found for this encounter  Diagnoses and all orders for this visit:    Acquired hypothyroidism  -Patient stable on current regimen of levothyroxine 125 mcg  - Denies any side effects on current dose may continue at this time    Primary hypertension  -BP well controlled on current regimen  -Patient denies any SOB, dyspnea, or CP    Gastroesophageal reflux disease without esophagitis  - GERD stable on current regimen  -Continue with PPI therapy    Chronic ethmoidal sinusitis  -Patient with known history of ethmoidal sinus abnormality which patient reported required in office surgical intervention, however, patient reports unfortunately this was never done  -Patient now reports with worsening symptoms of chronic sinus including increasing postnasal drip and discharge  Patient unable to tolerate nasal steroids as this incites migraines  -We will refer to ENT for further evaluation and management as above       - Ambulatory Referral to Otolaryngology; Future    Chronic constipation     -Counseled patient that given recurrence of internal hemorrhoids would consider evaluation and management with colorectal surgery for possible hemorrhoidectomy    Subjective:      Patient ID: Cindy Varghese is a 44 y o  female  Hypertension  This is a chronic problem  The current episode started more than 1 year ago  The problem is controlled  Pertinent negatives include no chest pain, neck pain or orthopnea  Agents associated with hypertension include thyroid hormones  Risk factors for coronary artery disease include dyslipidemia and obesity  Past treatments include diuretics and beta blockers  The current treatment provides significant improvement  There is no history of CAD/MI  Identifiable causes of hypertension include a thyroid problem  Heartburn  She complains of heartburn  She reports no chest pain, no coughing, no dysphagia or no nausea   This is a chronic problem  The current episode started more than 1 year ago  The problem occurs occasionally  The symptoms are aggravated by certain foods  Pertinent negatives include no melena  Risk factors include obesity  She has tried a PPI for the symptoms  The treatment provided significant relief  Thyroid Problem  Presents for follow-up (Has known history of hypothyroidism) visit  Symptoms include constipation  Patient reports no dry skin or hair loss  The symptoms have been stable (Patient is maintained on levothyroxine 125 mcg without complaints of side effects)  Constipation  This is a chronic (Patient has a known history of chronic constipation, for which she has been followed up with GI and started on Linzess) problem  The current episode started more than 1 year ago  Progression since onset: Of note, patient recently developed recurrence of hemorrhoids and has noted that there is worsening bleeding unless she consistently takes stool softeners every day  Patient reports she needs to drink MiraLAX in her coffee in order to have a bowel movemen  Stool description: Blood in stool secondary to internal hemorrhoids  She does not have bowel incontinence  She does not have bladder incontinence  She does not exercise regularly  Pertinent negatives include no melena, nausea or vomiting  Her past medical history is significant for irritable bowel syndrome  The last void occurred 13 to 24 hours ago  Sinus Problem  This is a chronic (With known history of chronic sinus issues with sinus surgery at 15 ) problem  The current episode started more than 1 year ago  Progression since onset: She reports worsening of sinus symptoms with increased postnasal drip and discharge  There has been no fever  Associated symptoms include congestion  Pertinent negatives include no coughing or neck pain  Treatments tried: Patient reports she does lavages and Rita pot for sinuses which does help symptoms    However, she does not use nasal steroids as this induces onset of migraine headaches  The following portions of the patient's history were reviewed and updated as appropriate: allergies, current medications, past family history, past medical history, past social history, past surgical history and problem list     Review of Systems   HENT: Positive for congestion and postnasal drip  Eyes: Negative for visual disturbance  Respiratory: Negative for cough  Cardiovascular: Negative for chest pain and orthopnea  Gastrointestinal: Positive for constipation and heartburn  Negative for dysphagia, melena, nausea and vomiting  Musculoskeletal: Negative for neck pain  Objective:      /70   Pulse 70   Temp 97 5 °F (36 4 °C) (Tympanic)   Ht 5' 6 5" (1 689 m)   Wt (!) 180 kg (396 lb 12 8 oz)   SpO2 100%   BMI 63 09 kg/m²          Physical Exam  Constitutional:       Appearance: She is well-developed  HENT:      Head: Normocephalic and atraumatic  Nose: Nose normal    Eyes:      Conjunctiva/sclera: Conjunctivae normal    Cardiovascular:      Rate and Rhythm: Normal rate and regular rhythm  Heart sounds: Normal heart sounds  Pulmonary:      Effort: Pulmonary effort is normal       Breath sounds: Normal breath sounds  Abdominal:      General: Bowel sounds are normal       Palpations: Abdomen is soft  Musculoskeletal:         General: Normal range of motion  Cervical back: Normal range of motion and neck supple  Skin:     General: Skin is warm and dry  Neurological:      Mental Status: She is alert and oriented to person, place, and time

## 2022-12-22 ENCOUNTER — TELEMEDICINE (OUTPATIENT)
Dept: BARIATRICS | Facility: CLINIC | Age: 37
End: 2022-12-22

## 2022-12-22 VITALS — HEIGHT: 67 IN | WEIGHT: 293 LBS | BODY MASS INDEX: 45.99 KG/M2

## 2022-12-22 DIAGNOSIS — F50.81 BINGE-EATING DISORDER, SEVERE: ICD-10-CM

## 2022-12-22 DIAGNOSIS — G47.33 OSA (OBSTRUCTIVE SLEEP APNEA): ICD-10-CM

## 2022-12-22 DIAGNOSIS — R73.01 IFG (IMPAIRED FASTING GLUCOSE): ICD-10-CM

## 2022-12-22 DIAGNOSIS — E66.01 MORBID OBESITY (HCC): Primary | ICD-10-CM

## 2022-12-22 NOTE — ASSESSMENT & PLAN NOTE
-Patient is pursuing Conservative Program  -Initial weight loss goal of 5-10% weight loss for improved health  -Screening labs: reviewed, up to date  -dietary/lifestyle changes, continue to work with [facility]  -On Topamax and Wellbutrin already   -avoid Phentermine  -Ozempic 1mg switch to Victoza due to shortage  TOlerating 1 8mg  Felt Ozempic helped control appetite more  -dietary recall reviewed  Patient struggling emotionally and has not  been focusing as much on her nutrition  Relying on fast food often    She will work on planning meals and preparing food to take with her to avoid fast food    -She will also work on increasing water to goal    Initial: 421 lbs  Current: 390 lbs (patient reported)  Change: -31 lbs, +5 lbs since last visit   Goal: wants to feel healthy

## 2022-12-22 NOTE — PROGRESS NOTES
Assessment/Plan: Morbid obesity ([facility] [address] )  -Patient is pursuing Conservative Program  -Initial weight loss goal of 5-10% weight loss for improved health  -Screening labs: reviewed, up to date  -dietary/lifestyle changes, continue to work with [address]  -On Topamax and Wellbutrin already   -avoid Phentermine  -Ozempic 1mg switch to Victoza due to shortage  TOlerating 1 8mg  Felt Ozempic helped control appetite more  -dietary recall reviewed  Patient struggling emotionally and has not  been focusing as much on her nutrition  Relying on fast food often  She will work on planning meals and preparing food to take with her to avoid fast food    -She will also work on increasing water to goal    Initial: 421 lbs  Current: 390 lbs (patient reported)  Change: -31 lbs, +5 lbs since last visit   Goal: wants to feel healthy    IFG (impaired fasting glucose)  -On Metformin 500mg daily  -On Victoza 1 8mg, no longer on Ozempic due to shortage  -HgbA1c improved to 5 6  -Tolerating Well  - Denies personal hx of pancreatitis or family hx of MEN2/MTC    Binge-eating disorder, severe  -followed by psychiatry  -hx Binge eating - on Vyvanse    Goals:    Food log (ie ) www myfitnesspal com,sparkpeople  com,loseit com,calorieking  com,etc    No sugary beverages  At least 64oz of water daily  Increase physical activity by 10 minutes daily  Gradually increase physical activity to a goal of 5 days per week for 30 minutes of MODERATE intensity PLUS 2 days per week of FULL BODY resistance training  Avoid white bread/bagels, switch to Whole grain  Increase water to goal 64oz  Plan meals to avoid fast food  Follow up in approximately 2 months with Non-Surgical Physician/Advanced Practitioner       Diagnoses and all orders for this visit:    Morbid obesity ([facility] [address] )    CAIO (obstructive sleep apnea)    IFG (impaired fasting glucose)    Binge-eating disorder, severe          Subjective:   Chief Complaint   Patient presents with   • Virtual Regular Visit • Virtual Regular Visit        Patient ID: Aniya Greco  is a 44 y o  female with excess weight/obesity here to pursue weight managment  Patient is pursuing Conservative Program      HPI  Patient presents for [facility] follow up  Ozempic was switched to Victoza one month ago due to shortage  She is tolerating well  Denies SE except for constipation but bowels are regulared now with PRN miralax  Continues with [address] [place] treatment for her depression      Exercise: denies, reports she is very overwhelmed in regards to her OCD  Hydration: diet decaf iced tea, coffee, 1 glass of water per day    B: MG Cheerios Or Raisin bran + 2% milk Or bagel or toast w/ PB   S:   L: coffee and candy bar, not preparing meals  S: string cheese  D: pizza or fast food      The following portions of the patient's history were reviewed and updated as appropriate: allergies, current medications, past family history, past medical history, past social history, past surgical history and problem list       Objective:    Ht 5' 6 5" (1 689 m)   Wt (!) 177 kg (390 lb)   BMI 62 00 kg/m²       Virtual Regular Visit    Verification of patient location:    Patient is located in the following state in which I hold an active license [place]      Assessment/Plan:    Problem List Items Addressed This Visit        Endocrine    IFG (impaired fasting glucose)     -On Metformin 500mg daily  -On Victoza 1 8mg, no longer on Ozempic due to shortage  -HgbA1c improved to 5 6  -Tolerating Well  - Denies personal hx of pancreatitis or family hx of MEN2/MTC            Respiratory    CAIO (obstructive sleep apnea)       Other    Binge-eating disorder, severe     -followed by psychiatry  -hx Binge eating - on Vyvanse         Morbid obesity ([facility] [address] ) - Primary     -Patient is pursuing Conservative Program  -Initial weight loss goal of 5-10% weight loss for improved health  -Screening labs: reviewed, up to date  -dietary/lifestyle changes, continue to work with [facility]  -On Topamax and Wellbutrin already   -avoid Phentermine  -Ozempic 1mg switch to Victoza due to shortage  TOlerating 1 8mg  Felt Ozempic helped control appetite more  -dietary recall reviewed  Patient struggling emotionally and has not  been focusing as much on her nutrition  Relying on fast food often  She will work on planning meals and preparing food to take with her to avoid fast food    -She will also work on increasing water to goal    Initial: 421 lbs  Current: 390 lbs (patient reported)  Change: -31 lbs, +5 lbs since last visit   Goal: wants to feel healthy                 Reason for visit is   Chief Complaint   Patient presents with   • Virtual Regular Visit   • Virtual Regular Visit        Encounter provider Aureliano Warren PA-C    Provider located at [address] [place] [id number] [phone]      Recent Visits  Date Type Provider Dept   12/21/22 Office Visit Damaris Casey MD [facility]   12/16/22 Telephone Damaris Casey MD [facility]   Showing recent visits within past 7 days and meeting all other requirements  Today's Visits  Date Type Provider Dept   12/22/22 Telemedicine Aureliano Warren PA-C [facility]   Showing today's visits and meeting all other requirements  Future Appointments  No visits were found meeting these conditions  Showing future appointments within next 150 days and meeting all other requirements       The patient was identified by name and date of birth  Norma Solid was informed that this is a telemedicine visit and that the visit is being conducted through the [address] [employer] platform  She agrees to proceed     My office door was closed  No one else was in the room  She acknowledged consent and understanding of privacy and security of the video platform   The patient has agreed to participate and understands they can discontinue the visit at any time  Patient is aware this is a billable service  Subjective  Tanisha White is a 44 y o  female MWM follow up   HPI     Past Medical History:   Diagnosis Date   • Anorexia nervosa in remission    • Anxiety    • Back pain    • Bipolar disorder ([address] )    • Depression    • Esophageal reflux 8/15/2013   • Hypertension    • Hypothyroid    • Idiopathic intracranial hypertension    • Lumbar degenerative disc disease 5/8/2014   • Migraine    • Obesity    • Obsessive-compulsive disorder    • Overactive bladder    • Psychiatric disorder    • Restless leg syndrome, controlled    • Seasonal allergies    • Suicide and self-inflicted injury [facility])    • Transcranial Magnetic Stimulation 09/06/2021       Past Surgical History:   Procedure Laterality Date   • DENTAL SURGERY      wisdom teeth-at [age] years  Other surgery dental extraxtion of bone spur (10 years ago)   • IR LUMBAR PUNCTURE  02/26/2019   • SINUS ENDOSCOPY     • TONSILLECTOMY         Current Outpatient Medications   Medication Sig Dispense Refill   • celecoxib (CeleBREX) 200 mg capsule TAKE 1 CAPSULE BY MOUTH TWICE A DAY 60 capsule 4   • Cholecalciferol (VITAMIN D3) 5000 units CAPS Take 5,000 Units by mouth daily     • clomiPRAMINE (ANAFRANIL) 50 mg capsule Take by mouth TAKE 2 CAPSULES IN AM AND 3 CAPSULES IN PM     • Emgality 120 MG/ML SOAJ INJECT 120 MG UNDER THE SKIN EVERY 30 DAYS   3 mL 1   • furosemide (LASIX) 40 mg tablet TAKE 1/2 TABLET BY MOUTH DAILY OR 1 AND 1/2 TABS IF WEIGHT GAIN OF 3 POUNDS IN ONE DAY 90 tablet 1   • gabapentin (NEURONTIN) 600 MG tablet TAKE 1/2 TABLET DAILY IN AM AND 1 AND 1/2 TABLET AT BEDTIME     • hydrocortisone-acetic acid (VOSOL-HC) otic solution Administer 3 drops into both ears every 6 (six) hours 10 mL 0   • Insulin Pen Needle 32G X 6 MM MISC Use once daily as directed 90 each 0   • levothyroxine 125 mcg tablet TAKE 1 TABLET BY MOUTH EVERY DAY IN THE MORNING 90 tablet 1   • Linzess 290 MCG CAPS TAKE 1 CAPSULE BY MOUTH EVERY DAY 30 capsule 5   • liraglutide (VICTOZA) injection Inject 0 6 mg subcutaneously once per day for the first week  Increase in weekly intervals by 0 6 mg until a dose of 1 8 mg is reached 9 mL 1   • loratadine (CLARITIN) 10 mg tablet Take 10 mg by mouth daily     • LORazepam (ATIVAN) 0 5 mg tablet      • metFORMIN (GLUCOPHAGE) 500 mg tablet TAKE 1 TABLET BY MOUTH EVERY DAY WITH BREAKFAST 90 tablet 0   • Multiple Vitamin (MULTIVITAMIN) capsule Take 1 capsule by mouth daily     • Nurtec 75 MG TBDP TAKE 75 MG BY MOUTH EVERY OTHER DAY   IF THERE IS A MIGRAINE ON A NON-NURTEC DAY, CAN TAKE A DOSE AND SKIP THE FOLLOWING DAY 16 tablet 1   • omeprazole (PriLOSEC) 20 mg delayed release capsule Take 1 capsule (20 mg total) by mouth 2 (two) times a day 180 capsule 1   • propranolol (INDERAL) 10 mg tablet Take 10 mg by mouth 2 (two) times a day     • rOPINIRole (REQUIP) 4 mg tablet TAKE 1 TABLET BY MOUTH 2 TIMES A DAY  180 tablet 1   • sertraline (ZOLOFT) 100 mg tablet Take 100 mg by mouth daily   0   • spironolactone (ALDACTONE) 50 mg tablet TAKE 1 TABLET BY MOUTH EVERY DAY 90 tablet 1   • SUMAtriptan (IMITREX) 100 mg tablet 1 tab as needed for migraine, can repeat once in 2 hours, max 200mg in 24 hours   12 tablet 3   • topiramate (TOPAMAX) 100 mg tablet TAKE 1 TABLET BY MOUTH EVERY DAY 90 tablet 3   • Vraylar 6 MG capsule Take 6 mg by mouth daily daily     • Vyvanse 50 MG capsule Take 50 mg by mouth daily       Current Facility-Administered Medications   Medication Dose Route Frequency Provider Last Rate Last Admin   • Botulinum Toxin Type A SOLR 200 Units  200 Units Injection Q3 Months Arlet Doyle PA-C   200 Units at 04/01/21 1453   • Botulinum Toxin Type A SOLR 200 Units  200 Units Injection Q3 Months Kimi Gar MD   200 Units at 07/28/21 1211        Allergies   Allergen Reactions   • Doxycycline      Pt refuses d/t remote H/O intracranial hypertension    • Other      Seasonal allergies Review of Systems   Respiratory: Negative  Cardiovascular: Negative  Gastrointestinal: Negative for abdominal pain  Psychiatric/Behavioral: Positive for dysphoric mood  Negative for suicidal ideas  Video Exam    Vitals:    12/22/22 1346   Weight: (!) 177 kg (390 lb)   Height: 5' 6 5" (1 689 m)       Physical Exam  Nursing note reviewed  Constitutional:       General: She is not in acute distress  Appearance: She is obese  She is not ill-appearing or toxic-appearing  HENT:      Head: Normocephalic and atraumatic  Eyes:      General: No scleral icterus  Pulmonary:      Effort: Pulmonary effort is normal  No respiratory distress  Abdominal:      Comments: Obese, protuberant   Neurological:      General: No focal deficit present  Mental Status: She is alert and oriented to person, place, and time  Psychiatric:         Mood and Affect: Mood normal          Behavior: Behavior normal          Thought Content:  Thought content normal          Judgment: Judgment normal           I spent 20 minutes directly with the patient during this visit

## 2022-12-22 NOTE — ASSESSMENT & PLAN NOTE
-On Metformin 500mg daily  -On Victoza 1 8mg, no longer on Ozempic due to shortage  -HgbA1c improved to 5 6  -Tolerating Well  - Denies personal hx of pancreatitis or family hx of MEN2/MTC

## 2022-12-27 ENCOUNTER — PATIENT MESSAGE (OUTPATIENT)
Dept: FAMILY MEDICINE CLINIC | Facility: CLINIC | Age: 37
End: 2022-12-27

## 2022-12-27 ENCOUNTER — TELEPHONE (OUTPATIENT)
Dept: OBGYN CLINIC | Facility: OTHER | Age: 37
End: 2022-12-27

## 2022-12-27 DIAGNOSIS — M79.644 PAIN OF FINGER OF RIGHT HAND: ICD-10-CM
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
## 2022-12-27 NOTE — PATIENT COMMUNICATION
PCP called patient to discuss worsening symptoms s/p dog bite on 12/9/22 and completion of appropriate antibiotic therapy  Xray of right second digit had underlying soft tissue gas in the interdigit space  Now, given worsening pain and induration need to r/o osteomyelitis  MRI will be ordered and referral to Hand Orthopedic will be sent

## 2022-12-27 NOTE — TELEPHONE ENCOUNTER
Patient is being referred to a orthopedics  Please schedule accordingly      [address]   [phone]

## 2022-12-28 ENCOUNTER — TELEPHONE (OUTPATIENT)
Dept: FAMILY MEDICINE CLINIC | Facility: CLINIC | Age: 37
End: 2022-12-28

## 2022-12-28 ENCOUNTER — HOSPITAL ENCOUNTER (OUTPATIENT)
Dept: MRI IMAGING | Facility: HOSPITAL | Age: 37
Discharge: HOME | End: 2022-12-28
Attending: FAMILY MEDICINE

## 2022-12-28 DIAGNOSIS — M79.644 PAIN OF FINGER OF RIGHT HAND: ICD-10-CM
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
## 2022-12-28 NOTE — TELEPHONE ENCOUNTER
Called patient to schedule an appointment with hand ortho for this week, provided the number [phone]  Patient said that she had gotten the MRI and Dr Ridge Macdonald wants her to get seen this week

## 2022-12-28 NOTE — TELEPHONE ENCOUNTER
Caller: darshan    Doctor: Will Navarro    Reason for call: patient calling to inform mri completed and ready to schedule     Call back#: [phone]

## 2022-12-29 ENCOUNTER — OFFICE VISIT (OUTPATIENT)
Dept: OBGYN CLINIC | Facility: CLINIC | Age: 37
End: 2022-12-29

## 2022-12-29 VITALS
HEIGHT: 67 IN | HEART RATE: 75 BPM | WEIGHT: 293 LBS | SYSTOLIC BLOOD PRESSURE: 131 MMHG | DIASTOLIC BLOOD PRESSURE: 78 MMHG | BODY MASS INDEX: 45.99 KG/M2

## 2022-12-29 DIAGNOSIS — S61.258A DOG BITE OF INDEX FINGER, INITIAL ENCOUNTER: Primary | ICD-10-CM
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
## 2022-12-29 NOTE — LETTER
December 29, 2022     Lydia Aparicio MD  [address] [facility] [address]    Patient: Maggie Jessica   YOB: 1985   Date of Visit: 12/29/2022       Dear Dr Compa Shrestha: Thank you for referring Jesus Crane to me for evaluation  Below are my notes for this consultation  If you have questions, please do not hesitate to call me  I look forward to following your patient along with you           Sincerely,        Chana Kennedy MD        CC: No Recipients

## 2022-12-29 NOTE — PROGRESS NOTES
ORTHOPAEDIC HAND, WRIST, AND ELBOW OFFICE  VISIT       ASSESSMENT/PLAN:      Right index finger dog bite nearly healed puncture wound with no evidence of infection or drainage    The patient verbalized understanding of exam findings and treatment plan  We engaged in the shared decision-making process and treatment options were discussed at length with the patient  Surgical and conservative management discussed today along with risks and benefits  Diagnoses and all orders for this visit:    Dog bite of index finger, initial encounter      Reviewed imaging with patient independently and no concerns clinically for infection or bony erosions  Puncture wound will continue to close and fully heal  She can use moisturizing cream to soften area and work on scar massage  Continue to gradually increase use of finger to tolerance  See back if any further issues     Follow Up:  Return if symptoms worsen or fail to improve         ____________________________________________________________________________________________________________________________________________      CHIEF COMPLAINT:  Chief Complaint   Patient presents with   • Right Hand - Pain       SUBJECTIVE:  Dusty Watkins is a 44y o  year old RHD female who presents for evaluation of dog bite on right hand  She initially seen at urgent care 12/9/22 for bite on index finger  She was prescribed anti biotic and finished she states  She also was having some numbness volar aspect of finger from PIP joint to distal tip  She was sent for MRI due to possible concern for infection  Patient has small wound index finger which has almost healed with scab with no current drainage or signs of infection  Minimal pain  Denies fever,chills      Pain/symptom timing:  Worse during the day when active  Pain/symptom context:  Worse with activites and work  Pain/symptom modifying factors:  Rest makes better, activities make worse  Pain/symptom associated signs/symptoms: none    Prior treatment   · NSAIDsYes   · Injections No   · Bracing/Orthotics No    Physical Therapy No     I have personally reviewed all the relevant PMH, PSH, SH, FH, Medications and allergies      PAST MEDICAL HISTORY:  Past Medical History:   Diagnosis Date   • Anorexia nervosa in remission    • Anxiety    • Back pain    • Bipolar disorder ([facility] [address] )    • Depression    • Esophageal reflux 8/15/2013   • Hypertension    • Hypothyroid    • Idiopathic intracranial hypertension    • Lumbar degenerative disc disease 5/8/2014   • Migraine    • Obesity    • Obsessive-compulsive disorder    • Overactive bladder    • Psychiatric disorder    • Restless leg syndrome, controlled    • Seasonal allergies    • Suicide and self-inflicted injury [facility])    • Transcranial Magnetic Stimulation 09/06/2021       PAST SURGICAL HISTORY:  Past Surgical History:   Procedure Laterality Date   • DENTAL SURGERY      wisdom teeth-at [age] years   Other surgery dental extraxtion of bone spur (10 years ago)   • IR LUMBAR PUNCTURE  02/26/2019   • SINUS ENDOSCOPY     • TONSILLECTOMY         FAMILY HISTORY:  Family History   Problem Relation Age of Onset   • Mental illness Mother    • Depression Mother    • Suicide Attempts Mother    • Hypertension Mother    • Diabetes Mother    • Other Mother         bicuspid aortic valve   • Hypertension Father    • Hypothyroidism Father    • Hypertension Brother    • Cancer Maternal Grandmother    • Heart disease Maternal Grandmother    • Stroke Maternal Grandmother    • Breast cancer Maternal Grandmother    • Skin cancer Maternal Grandmother    • Pneumonia Maternal Grandfather    • Cancer Maternal Grandfather    • Cancer Paternal Grandmother    • Pneumonia Paternal Grandfather    • Arthritis Family    • Breast cancer Family    • Osteopenia Family    • Osteoporosis Family    • Hypertension Family    • Transient ischemic attack Family        SOCIAL HISTORY:  Social History     Tobacco Use   • Smoking status: Never   • Smokeless tobacco: Never   Vaping Use   • Vaping Use: Never used   Substance Use Topics   • Alcohol use: Not Currently   • Drug use: No       MEDICATIONS:    Current Outpatient Medications:   •  celecoxib (CeleBREX) 200 mg capsule, TAKE 1 CAPSULE BY MOUTH TWICE A DAY, Disp: 60 capsule, Rfl: 4  •  Cholecalciferol (VITAMIN D3) 5000 units CAPS, Take 5,000 Units by mouth daily, Disp: , Rfl:   •  clomiPRAMINE (ANAFRANIL) 50 mg capsule, Take by mouth TAKE 2 CAPSULES IN AM AND 3 CAPSULES IN PM, Disp: , Rfl:   •  Emgality 120 MG/ML SOAJ, INJECT 120 MG UNDER THE SKIN EVERY 30 DAYS , Disp: 3 mL, Rfl: 1  •  furosemide (LASIX) 40 mg tablet, TAKE 1/2 TABLET BY MOUTH DAILY OR 1 AND 1/2 TABS IF WEIGHT GAIN OF 3 POUNDS IN ONE DAY, Disp: 90 tablet, Rfl: 1  •  gabapentin (NEURONTIN) 600 MG tablet, TAKE 1/2 TABLET DAILY IN AM AND 1 AND 1/2 TABLET AT BEDTIME, Disp: , Rfl:   •  hydrocortisone-acetic acid (VOSOL-HC) otic solution, Administer 3 drops into both ears every 6 (six) hours, Disp: 10 mL, Rfl: 0  •  Insulin Pen Needle 32G X 6 MM MISC, Use once daily as directed, Disp: 90 each, Rfl: 0  •  levothyroxine 125 mcg tablet, TAKE 1 TABLET BY MOUTH EVERY DAY IN THE MORNING, Disp: 90 tablet, Rfl: 1  •  Linzess 290 MCG CAPS, TAKE 1 CAPSULE BY MOUTH EVERY DAY, Disp: 30 capsule, Rfl: 5  •  liraglutide (VICTOZA) injection, Inject 0 6 mg subcutaneously once per day for the first week  Increase in weekly intervals by 0 6 mg until a dose of 1 8 mg is reached, Disp: 9 mL, Rfl: 1  •  loratadine (CLARITIN) 10 mg tablet, Take 10 mg by mouth daily, Disp: , Rfl:   •  LORazepam (ATIVAN) 0 5 mg tablet, , Disp: , Rfl:   •  metFORMIN (GLUCOPHAGE) 500 mg tablet, TAKE 1 TABLET BY MOUTH EVERY DAY WITH BREAKFAST, Disp: 90 tablet, Rfl: 0  •  Multiple Vitamin (MULTIVITAMIN) capsule, Take 1 capsule by mouth daily, Disp: , Rfl:   •  Nurtec 75 MG TBDP, TAKE 75 MG BY MOUTH EVERY OTHER DAY    IF THERE IS A MIGRAINE ON A NON-NURTEC DAY, CAN TAKE A DOSE AND SKIP THE FOLLOWING DAY, Disp: 16 tablet, Rfl: 1  •  omeprazole (PriLOSEC) 20 mg delayed release capsule, Take 1 capsule (20 mg total) by mouth 2 (two) times a day, Disp: 180 capsule, Rfl: 1  •  propranolol (INDERAL) 10 mg tablet, Take 10 mg by mouth 2 (two) times a day, Disp: , Rfl:   •  rOPINIRole (REQUIP) 4 mg tablet, TAKE 1 TABLET BY MOUTH 2 TIMES A DAY , Disp: 180 tablet, Rfl: 1  •  sertraline (ZOLOFT) 100 mg tablet, Take 100 mg by mouth daily , Disp: , Rfl: 0  •  spironolactone (ALDACTONE) 50 mg tablet, TAKE 1 TABLET BY MOUTH EVERY DAY, Disp: 90 tablet, Rfl: 1  •  SUMAtriptan (IMITREX) 100 mg tablet, 1 tab as needed for migraine, can repeat once in 2 hours, max 200mg in 24 hours  , Disp: 12 tablet, Rfl: 3  •  topiramate (TOPAMAX) 100 mg tablet, TAKE 1 TABLET BY MOUTH EVERY DAY, Disp: 90 tablet, Rfl: 3  •  Vraylar 6 MG capsule, Take 6 mg by mouth daily daily, Disp: , Rfl:   •  Vyvanse 50 MG capsule, Take 50 mg by mouth daily, Disp: , Rfl:     Current Facility-Administered Medications:   •  Botulinum Toxin Type A SOLR 200 Units, 200 Units, Injection, Q3 Months, Arlet Doyle PA-C, 200 Units at 04/01/21 1453  •  Botulinum Toxin Type A SOLR 200 Units, 200 Units, Injection, Q3 Months, Genet Morales MD, 200 Units at 07/28/21 1211    ALLERGIES:  Allergies   Allergen Reactions   • Doxycycline      Pt refuses d/t remote H/O intracranial hypertension    • Other      Seasonal allergies            REVIEW OF SYSTEMS:  Review of Systems   Constitutional: Negative for chills and fever  HENT: Negative for ear pain and sore throat  Eyes: Negative for pain and visual disturbance  Respiratory: Negative for cough and shortness of breath  Cardiovascular: Negative for chest pain and palpitations  Gastrointestinal: Negative for abdominal pain and vomiting  Genitourinary: Negative for dysuria and hematuria  Musculoskeletal: Negative for arthralgias and back pain  Skin: Negative for color change and rash  Neurological: Negative for seizures and syncope  All other systems reviewed and are negative  VITALS:  Vitals:    12/29/22 1339   BP: 131/78   Pulse: 75       LABS:  HgA1c:   Lab Results   Component Value Date    HGBA1C 5 6 03/08/2022     BMP:   Lab Results   Component Value Date    GLUCOSE 89 10/20/2015    CALCIUM 9 6 10/28/2022     10/20/2015    K 4 5 10/28/2022    CO2 25 10/28/2022     10/28/2022    BUN 23 10/28/2022    CREATININE 0 88 10/28/2022       _____________________________________________________  PHYSICAL EXAMINATION:  General: well developed and well nourished, alert, oriented times 3 and appears comfortable  Psychiatric: Normal  HEENT: Normocephalic, Atraumatic Trachea Midline, No torticollis  Pulmonary: No audible wheezing or respiratory distress   Abdomen/GI: Non tender, non distended   Cardiovascular: No pitting edema, 2+ radial pulse   Skin: No masses, erythema, lacerations, fluctation, ulcerations  Neurovascular: Sensation Intact to the Median, Ulnar, Radial Nerve, Motor Intact to the Median, Ulnar, Radial Nerve and Pulses Intact  Musculoskeletal: Normal, except as noted in detailed exam and in HPI  MUSCULOSKELETAL EXAMINATION:  Right index finger  Nearly healed small puncture wound ulnar aspect of PIP joint with no signs of drainage or infection  Mild swelling PIP joint  Area slightly raised due to puncture wound  Flexor and extensor mechanism intact  Slightly decreased motion at PIP joint secondary to swelling, stiffness but able to make a full fist complex  Sensation intact digit      ___________________________________________________  STUDIES REVIEWED:  I have personally reviewed AP lateral and oblique radiographs of right hand index finger  which demonstrate no acute fracture or dislocation, minimal soft tissue gas interdigital web 2nd and 3rd fingers     MRI right hand demonstrates subcutaneous edema ulnar aspect of index finger at level of PIP jont with no well defined fluid collection to suggest abscess, no evidence for osteomyelitis             PROCEDURES PERFORMED:  Procedures  No Procedures performed today    _____________________________________________________      Ashwin Schmidt    I,:  Froylan Sherman am acting as a scribe while in the presence of the attending physician :       I,:  Amish Betancourt MD personally performed the services described in this documentation    as scribed in my presence :

## 2022-12-30 ENCOUNTER — APPOINTMENT (OUTPATIENT)
Dept: LAB | Facility: CLINIC | Age: 37
End: 2022-12-30

## 2022-12-30 DIAGNOSIS — G93.2 IDIOPATHIC INTRACRANIAL HYPERTENSION: ICD-10-CM

## 2022-12-30 LAB
ALBUMIN SERPL BCP-MCNC: 3.3 G/DL (ref 3.5–5)
ALP SERPL-CCNC: 87 U/L (ref 46–116)
ALT SERPL W P-5'-P-CCNC: 28 U/L (ref 12–78)
ANION GAP SERPL CALCULATED.3IONS-SCNC: 5 MMOL/L (ref 4–13)
AST SERPL W P-5'-P-CCNC: 11 U/L (ref 5–45)
BACTERIA UR QL AUTO: ABNORMAL /HPF
BASOPHILS # BLD AUTO: 0.1 THOUSANDS/ÂΜL (ref 0–0.1)
BASOPHILS NFR BLD AUTO: 1 % (ref 0–1)
BILIRUB SERPL-MCNC: 0.32 MG/DL (ref 0.2–1)
BILIRUB UR QL STRIP: NEGATIVE
BUN SERPL-MCNC: 17 MG/DL (ref 5–25)
CALCIUM ALBUM COR SERPL-MCNC: 9.5 MG/DL (ref 8.3–10.1)
CALCIUM SERPL-MCNC: 8.9 MG/DL (ref 8.3–10.1)
CHLORIDE SERPL-SCNC: 108 MMOL/L (ref 96–108)
CLARITY UR: ABNORMAL
CO2 SERPL-SCNC: 26 MMOL/L (ref 21–32)
COLOR UR: YELLOW
CREAT SERPL-MCNC: 0.76 MG/DL (ref 0.6–1.3)
CREAT UR-MCNC: 84.2 MG/DL
CREAT UR-MCNC: 84.2 MG/DL
EOSINOPHIL # BLD AUTO: 0.33 THOUSAND/ÂΜL (ref 0–0.61)
EOSINOPHIL NFR BLD AUTO: 4 % (ref 0–6)
ERYTHROCYTE [DISTWIDTH] IN BLOOD BY AUTOMATED COUNT: 13.9 % (ref 11.6–15.1)
GFR SERPL CREATININE-BSD FRML MDRD: 99 ML/MIN/1.73SQ M
GLUCOSE P FAST SERPL-MCNC: 98 MG/DL (ref 65–99)
GLUCOSE UR STRIP-MCNC: NEGATIVE MG/DL
HCT VFR BLD AUTO: 43.6 % (ref 34.8–46.1)
HGB BLD-MCNC: 13.3 G/DL (ref 11.5–15.4)
HGB UR QL STRIP.AUTO: NEGATIVE
IMM GRANULOCYTES # BLD AUTO: 0.03 THOUSAND/UL (ref 0–0.2)
IMM GRANULOCYTES NFR BLD AUTO: 0 % (ref 0–2)
KETONES UR STRIP-MCNC: NEGATIVE MG/DL
LEUKOCYTE ESTERASE UR QL STRIP: NEGATIVE
LYMPHOCYTES # BLD AUTO: 2.83 THOUSANDS/ÂΜL (ref 0.6–4.47)
LYMPHOCYTES NFR BLD AUTO: 31 % (ref 14–44)
MCH RBC QN AUTO: 28.5 PG (ref 26.8–34.3)
MCHC RBC AUTO-ENTMCNC: 30.5 G/DL (ref 31.4–37.4)
MCV RBC AUTO: 94 FL (ref 82–98)
MICROALBUMIN UR-MCNC: <5 MG/L (ref 0–20)
MICROALBUMIN/CREAT 24H UR: <6 MG/G CREATININE (ref 0–30)
MONOCYTES # BLD AUTO: 0.66 THOUSAND/ÂΜL (ref 0.17–1.22)
MONOCYTES NFR BLD AUTO: 7 % (ref 4–12)
NEUTROPHILS # BLD AUTO: 5.28 THOUSANDS/ÂΜL (ref 1.85–7.62)
NEUTS SEG NFR BLD AUTO: 57 % (ref 43–75)
NITRITE UR QL STRIP: NEGATIVE
NON-SQ EPI CELLS URNS QL MICRO: ABNORMAL /HPF
NRBC BLD AUTO-RTO: 0 /100 WBCS
PH UR STRIP.AUTO: 8 [PH]
PLATELET # BLD AUTO: 322 THOUSANDS/UL (ref 149–390)
PMV BLD AUTO: 10 FL (ref 8.9–12.7)
POTASSIUM SERPL-SCNC: 4.5 MMOL/L (ref 3.5–5.3)
PROT SERPL-MCNC: 6.9 G/DL (ref 6.4–8.4)
PROT UR STRIP-MCNC: ABNORMAL MG/DL
PROT UR-MCNC: 18 MG/DL
PROT/CREAT UR: 0.21 MG/G{CREAT} (ref 0–0.1)
RBC # BLD AUTO: 4.66 MILLION/UL (ref 3.81–5.12)
RBC #/AREA URNS AUTO: ABNORMAL /HPF
SODIUM SERPL-SCNC: 139 MMOL/L (ref 135–147)
SP GR UR STRIP.AUTO: 1.02 (ref 1–1.03)
UROBILINOGEN UR STRIP-ACNC: <2 MG/DL
WBC # BLD AUTO: 9.23 THOUSAND/UL (ref 4.31–10.16)
WBC #/AREA URNS AUTO: ABNORMAL /HPF

## 2023-01-03 ENCOUNTER — TELEPHONE (OUTPATIENT)
Dept: FAMILY MEDICINE CLINIC | Facility: CLINIC | Age: 38
End: 2023-01-03

## 2023-01-03 NOTE — TELEPHONE ENCOUNTER
Patient called to let us know how her appointment when with hand ortho, "it was a strange experience and asking why she was there"  Patient states, finger does not feel right and burns when touches other finger  Spoke with Dr Mio Horn briefly, told patient if she feels like something is wrong that she should see another hand ortho doctor  She also inquired about her AWV in March with a provider she never saw before  Dr Mio Horn still over sees the visits while working with the resident doctors

## 2023-01-04 ENCOUNTER — TELEPHONE (OUTPATIENT)
Dept: FAMILY MEDICINE CLINIC | Facility: CLINIC | Age: 38
End: 2023-01-04

## 2023-01-04 NOTE — TELEPHONE ENCOUNTER
I called Elizabeth to check in and see how her hand was doing and if another referral was needed due to not having the best experience with [place] [facility]  Elizabeth mentioned that she does have burning and that it still hurts, but she mentioned that in general it has improved   Patient is curious about what was shown on the MRI and if she needs to go somewhere else/needs another referral or if she should just let it heal

## 2023-01-05 ENCOUNTER — OFFICE VISIT (OUTPATIENT)
Dept: NEPHROLOGY | Facility: CLINIC | Age: 38
End: 2023-01-05

## 2023-01-05 VITALS
BODY MASS INDEX: 45.99 KG/M2 | HEART RATE: 73 BPM | SYSTOLIC BLOOD PRESSURE: 120 MMHG | WEIGHT: 293 LBS | DIASTOLIC BLOOD PRESSURE: 78 MMHG | HEIGHT: 67 IN | OXYGEN SATURATION: 99 %

## 2023-01-05 DIAGNOSIS — E26.9 HYPERALDOSTERONISM (HCC): ICD-10-CM

## 2023-01-05 DIAGNOSIS — R80.1 PERSISTENT PROTEINURIA: ICD-10-CM

## 2023-01-05 DIAGNOSIS — G93.2 IDIOPATHIC INTRACRANIAL HYPERTENSION: Primary | ICD-10-CM

## 2023-01-05 DIAGNOSIS — E87.70 HYPERVOLEMIA, UNSPECIFIED HYPERVOLEMIA TYPE: ICD-10-CM

## 2023-01-05 NOTE — PATIENT INSTRUCTIONS
Take lasix two pills (80 mg) until you feel less bloated and then return to 60 mg daily  Lab work in 6 months

## 2023-01-05 NOTE — PROGRESS NOTES
Nephrology   Office Follow-Up  Sabrina Lerner 44 y o  female MRN: [id number]    Encounter: [id number]        [address] was seen in the [facility] office today  All diagnoses and orders for visit:     1  Idiopathic intracranial hypertension  · Did not tolerate Diamox  No significant change in symptoms with Lasix  On Topamax  She will discuss with her neurologist  Will gladly order Diamox again if she is agreeable  Continued efforts toward weight loss   -     CBC and differential; Future; Expected date: 06/06/2023   -     Comprehensive metabolic panel; Future; Expected date: 06/06/2023   -     Microalbumin / creatinine urine ratio; Future; Expected date: 06/06/2023   -     Urinalysis with microscopic; Future; Expected date: 06/06/2023   -     Protein / creatinine ratio, urine; Future; Expected date: 06/06/2023   -     Magnesium; Future; Expected date: 06/06/2023   -     Phosphorus; Future; Expected date: 06/06/2023  2  Hyperaldosteronism (HCC)  · Blood pressure is appropriate, no change to spironolactone  3  Hypervolemia, unspecified hypervolemia type  · Enjoying a high sodium diet  Increase lasix to 80 mg daily x 5 days then return to 60 mg daily  Reduce sodium in diet  4  Persistent proteinuria  · 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         HPI: Sabrina Lerner is a 44 y o  female who follows with nephrology for volume management, proteinuria, idiopathic intracranial hypertension  She previously was on Diamox  however could not tolerate this medication due to dry mouth  She is now on lasix and Topamax  She is still symptomatic with significant headaches  Other complaints include bloating and weight gain since stopping Ozempic (on back order)  Enjoying a high sodium diet lately, including fast food  Feels bloated  Increase lasix to 80 mg daily until improvement noted or 5 days have passed then return to 60 mg daily   She will discuss returning to/adding Diamox for idiopathic intracranial hypertension  ROS:   Review of Systems   Constitutional: Positive for activity change  Negative for chills and fever  HENT: Negative for ear pain and sore throat  Eyes: Negative for pain and visual disturbance  Respiratory: Negative for cough and shortness of breath  Cardiovascular: Positive for leg swelling  Negative for chest pain and palpitations  Gastrointestinal: Positive for abdominal distention and constipation  Negative for abdominal pain and vomiting  Genitourinary: Negative for dysuria and hematuria  Musculoskeletal: Negative for arthralgias and back pain  Skin: Negative for color change and rash  Neurological: Positive for headaches  Negative for seizures and syncope  All other systems reviewed and are negative  Allergies: Doxycycline and Other    Medications:   Current Outpatient Medications:   •  celecoxib (CeleBREX) 200 mg capsule, TAKE 1 CAPSULE BY MOUTH TWICE A DAY, Disp: 60 capsule, Rfl: 4  •  Cholecalciferol (VITAMIN D3) 5000 units CAPS, Take 5,000 Units by mouth daily, Disp: , Rfl:   •  clomiPRAMINE (ANAFRANIL) 50 mg capsule, Take by mouth TAKE 2 CAPSULES IN AM AND 3 CAPSULES IN PM, Disp: , Rfl:   •  Emgality 120 MG/ML SOAJ, INJECT 120 MG UNDER THE SKIN EVERY 30 DAYS , Disp: 3 mL, Rfl: 1  •  furosemide (LASIX) 40 mg tablet, TAKE 1/2 TABLET BY MOUTH DAILY OR 1 AND 1/2 TABS IF WEIGHT GAIN OF 3 POUNDS IN ONE DAY, Disp: 90 tablet, Rfl: 1  •  gabapentin (NEURONTIN) 600 MG tablet, TAKE 1/2 TABLET DAILY IN AM AND 1 AND 1/2 TABLET AT BEDTIME, Disp: , Rfl:   •  Insulin Pen Needle 32G X 6 MM MISC, Use once daily as directed, Disp: 90 each, Rfl: 0  •  levothyroxine 125 mcg tablet, TAKE 1 TABLET BY MOUTH EVERY DAY IN THE MORNING, Disp: 90 tablet, Rfl: 1  •  Linzess 290 MCG CAPS, TAKE 1 CAPSULE BY MOUTH EVERY DAY, Disp: 30 capsule, Rfl: 5  •  liraglutide (VICTOZA) injection, Inject 0 6 mg subcutaneously once per day for the first week   Increase in weekly intervals by 0 6 mg until a dose of 1 8 mg is reached, Disp: 9 mL, Rfl: 1  •  loratadine (CLARITIN) 10 mg tablet, Take 10 mg by mouth daily, Disp: , Rfl:   •  LORazepam (ATIVAN) 0 5 mg tablet, , Disp: , Rfl:   •  metFORMIN (GLUCOPHAGE) 500 mg tablet, TAKE 1 TABLET BY MOUTH EVERY DAY WITH BREAKFAST, Disp: 90 tablet, Rfl: 0  •  Multiple Vitamin (MULTIVITAMIN) capsule, Take 1 capsule by mouth daily, Disp: , Rfl:   •  Nurtec 75 MG TBDP, TAKE 75 MG BY MOUTH EVERY OTHER DAY   IF THERE IS A MIGRAINE ON A NON-NURTEC DAY, CAN TAKE A DOSE AND SKIP THE FOLLOWING DAY, Disp: 16 tablet, Rfl: 1  •  omeprazole (PriLOSEC) 20 mg delayed release capsule, Take 1 capsule (20 mg total) by mouth 2 (two) times a day, Disp: 180 capsule, Rfl: 1  •  propranolol (INDERAL) 10 mg tablet, Take 10 mg by mouth 2 (two) times a day, Disp: , Rfl:   •  rOPINIRole (REQUIP) 4 mg tablet, TAKE 1 TABLET BY MOUTH 2 TIMES A DAY , Disp: 180 tablet, Rfl: 1  •  sertraline (ZOLOFT) 100 mg tablet, Take 100 mg by mouth daily , Disp: , Rfl: 0  •  spironolactone (ALDACTONE) 50 mg tablet, TAKE 1 TABLET BY MOUTH EVERY DAY, Disp: 90 tablet, Rfl: 1  •  SUMAtriptan (IMITREX) 100 mg tablet, 1 tab as needed for migraine, can repeat once in 2 hours, max 200mg in 24 hours  , Disp: 12 tablet, Rfl: 3  •  topiramate (TOPAMAX) 100 mg tablet, TAKE 1 TABLET BY MOUTH EVERY DAY, Disp: 90 tablet, Rfl: 3  •  Vraylar 6 MG capsule, Take 6 mg by mouth daily daily, Disp: , Rfl:   •  Vyvanse 50 MG capsule, Take 50 mg by mouth daily, Disp: , Rfl:   •  hydrocortisone-acetic acid (VOSOL-HC) otic solution, Administer 3 drops into both ears every 6 (six) hours (Patient not taking: Reported on 1/5/2023), Disp: 10 mL, Rfl: 0    Current Facility-Administered Medications:   •  Botulinum Toxin Type A SOLR 200 Units, 200 Units, Injection, Q3 Months, Arlet Jasper-Rhinelander, PA-C, 200 Units at 04/01/21 1453  •  Botulinum Toxin Type A SOLR 200 Units, 200 Units, Injection, Q3 Months, Selina Acosta MD, 200 Units at 07/28/21 1211    Past Medical History:   Diagnosis Date   • Anorexia nervosa in remission    • Anxiety    • Back pain    • Bipolar disorder ([facility] [address] )    • Depression    • Esophageal reflux 8/15/2013   • Hypertension    • Hypothyroid    • Idiopathic intracranial hypertension    • Lumbar degenerative disc disease 5/8/2014   • Migraine    • Obesity    • Obsessive-compulsive disorder    • Overactive bladder    • Psychiatric disorder    • Restless leg syndrome, controlled    • Seasonal allergies    • Suicide and self-inflicted injury [facility])    • Transcranial Magnetic Stimulation 09/06/2021     Past Surgical History:   Procedure Laterality Date   • DENTAL SURGERY      wisdom teeth-at [age] years  Other surgery dental extraxtion of bone spur (10 years ago)   • IR LUMBAR PUNCTURE  02/26/2019   • SINUS ENDOSCOPY     • TONSILLECTOMY       Family History   Problem Relation Age of Onset   • Mental illness Mother    • Depression Mother    • Suicide Attempts Mother    • Hypertension Mother    • Diabetes Mother    • Other Mother         bicuspid aortic valve   • Hypertension Father    • Hypothyroidism Father    • Hypertension Brother    • Cancer Maternal Grandmother    • Heart disease Maternal Grandmother    • Stroke Maternal Grandmother    • Breast cancer Maternal Grandmother    • Skin cancer Maternal Grandmother    • Pneumonia Maternal Grandfather    • Cancer Maternal Grandfather    • Cancer Paternal Grandmother    • Pneumonia Paternal Grandfather    • Arthritis Family    • Breast cancer Family    • Osteopenia Family    • Osteoporosis Family    • Hypertension Family    • Transient ischemic attack Family       reports that she has never smoked  She has never used smokeless tobacco  She reports that she does not currently use alcohol  She reports that she does not use drugs        Physical Exam:   Vitals:    01/05/23 1405   BP: 120/78   Pulse: 73   SpO2: 99%   Weight: (!) 180 kg (396 lb 12 8 oz)   Height: 5' 6 5" (1 689 m) Body mass index is 63 09 kg/m²  General: conscious, cooperative, in no acute distress, appears stated age  Eyes: conjunctivae pale, anicteric sclerae  ENT: lips and mucous membranes moist  Neck: supple, no JVD, no masses  Chest:  essentially clear breath sounds bilaterally, no crackles, ronchus or wheezings  CVS: S1 & S2, normal rate, regular rhythm  Abdomen: soft, non-tender, non-distended, normoactive bowel sounds, rounded obese  Extremities: no edema of both legs  Skin: no rash   Neuro: awake, alert, oriented       Diagnostic Data:  Lab: I have personally reviewed pertinent lab results  ,   CBC:  Results from last 7 days   Lab Units 12/30/22  0821   WBC Thousand/uL 9 23   HEMOGLOBIN g/dL 13 3   HEMATOCRIT % 43 6   PLATELETS Thousands/uL 322      CMP: No results found for: SODIUM, K, CL, CO2, ANIONGAP, BUN, CREATININE, GLUCOSE, CALCIUM, AST, ALT, ALKPHOS, PROT, BILITOT, EGFR,   PT/INR: No results found for: PT, INR,   Magnesium: No components found for: MAG,  Phosphorous: No results found for: PHOS    Patient Instructions   Take lasix two pills (80 mg) until you feel less bloated and then return to 60 mg daily  Lab work in 6 months       Portions of the record may have been created with voice recognition software  Occasional wrong word or "sound a like" substitutions may have occurred due to the inherent limitations of voice recognition software  Read the chart carefully and recognize, using context, where substitutions have occurred  If you have any questions, please contact the dictating provider

## 2023-01-06 NOTE — TELEPHONE ENCOUNTER
PCP called patient back to check-in regarding recent Orthopedic visit  PCP reviewed findings from MRI as she reports that unfortunately this was not explained to her during recent visit  PCP encouraged her to return or call back if symptoms acutely worsen as she continues to have a burning sensation on the ulnar aspect of her finger without relief

## 2023-01-13 ENCOUNTER — TELEPHONE (OUTPATIENT)
Dept: NEUROLOGY | Facility: CLINIC | Age: 38
End: 2023-01-13

## 2023-01-13 NOTE — TELEPHONE ENCOUNTER
Carmelita christian:    Hi, this is [employer], last name is marcellallrubi DE LOS SANTOS  My YOB: 1985  I am a p;atient of Dr Timi Taylor    Within the last 2 or 3 weeks my migraines have really been a lot worse  I've been getting headaches almost every day  And for the past 2 or 3 days i've been really having a lot of them  With more intensity  I just wanted to check and see if there's anything that can be done  I know I have a virtual appointment with him on, I believe the 24th  Like I said, my name is [employer], august 18th, 1985 phone number [phone]  Thank you  I returned call    Patient of Dr Timi Taylor, last visit 8/22,  of [facility]    Patient reporting pain in neck, ringing in ears,feeling  "off balance", pain behind eyes and in back of head "2-3/10" currently with light sensitivity and mild nausea progressively worsening past 2-3 weeks despite imitrex and trying to rest     She said her nephrologist did suggest switching to diamox; currently on lasix 60-80 mg daily; tried diamox in past but caused dry mouth however she is willing to switch if Dr Timi Taylor recommends  Other meds:  nurtec qod  emgality  celebrex 200 mg bid  topamax 100 mg qd   Propranolol 20 mg bid (managed by psychiatrist)  Lasix 60-80 mg daily  Please provide recommendation, thank you

## 2023-01-17 NOTE — TELEPHONE ENCOUNTER
Patient aware of input/recommendation  She said she feels like she does not want to "cover up symptoms" with medications; she wants to make sure not r/t II  MRI brain was ordered 1/6/2021 but was denied by insurance  Do you want to reorder and we will try to get approval?  She thinks the last MRI was in 2018 at [facility]  She said she was also hoping switch from lasix to diamox could be considered; taking 80 mg lasix daily and headaches are persistent despite lasix; said nephrologist would be receptive to change to diamox  She is not receptive to [facility] as does not want to take additional psych meds; she is also 1 5 hours away from office so toradol would be inconvenient but she would schedule if that is her only option  Please provide recommendations, thank you

## 2023-01-18 ENCOUNTER — TELEPHONE (OUTPATIENT)
Dept: NEUROLOGY | Facility: CLINIC | Age: 38
End: 2023-01-18

## 2023-01-18 DIAGNOSIS — G93.2 IIH (IDIOPATHIC INTRACRANIAL HYPERTENSION): Primary | ICD-10-CM

## 2023-01-18 DIAGNOSIS — G43.709 CHRONIC MIGRAINE WITHOUT AURA WITHOUT STATUS MIGRAINOSUS, NOT INTRACTABLE: ICD-10-CM

## 2023-01-18 RX ORDER — DIVALPROEX SODIUM 500 MG/1
TABLET, EXTENDED RELEASE ORAL
Qty: 6 TABLET | Refills: 0 | Status: SHIPPED | OUTPATIENT
Start: 2023-01-18 | End: 2023-01-27

## 2023-01-18 NOTE — TELEPHONE ENCOUNTER
Received -Hi, this is [employer]  I am just calling radha back  She had called me regarding a message from Dr Reyes Child that she wanted me to read on my chart  So like I said, I'm just calling her back  My phone number is [phone]  My name is [employer] and my birth is August 18th 1985  Thank you

## 2023-01-18 NOTE — TELEPHONE ENCOUNTER
Returned call; reached vm, requested patient send my chart message or cb with best time to return her call

## 2023-01-18 NOTE — TELEPHONE ENCOUNTER
----- Message from Roseline Mora RN sent at 1/18/2023  3:45 PM EST -----  Regarding: FW: symptoms  Contact: [phone]    ----- Message -----  From: Fadi Huang "Elizabeth"  Sent: 1/18/2023   3:31 PM EST  To: Neurology [facility]  Subject: symptoms                                         Sorry I missed your call  There’s not necessarily a better time to call as I am usually available, however today I honestly fell asleep  I really have not been feeling good and ended up napping  The message you passed along would be easier to discuss through a phone call  Would I be seeing an ophthalmologist that is a [facility] provider? I have been seeing a private ophthalmologist, but I don’t think he ever communicated with you at all in the past   I’m also not sure if he would be on the same page as Dr Sammy Almazan as I was on a higher dose of Topamax in the past   That ophthalmologist cut it down because he thought I was having problems focusing due to that dose  Cutting it down did not help my vision, but I never got put back to a higher dose  I’m not the most comfortable with adding psych meds on top of everything my psychiatrist already prescribes, but I will  the Depekote and try that if there’s any hope I will feel a bit better      Thank you,  Carmen Nyhan

## 2023-01-18 NOTE — TELEPHONE ENCOUNTER
I called patient to let her know Dr Cassy Shelton gave recommendation/input and I will send via my chart message; please message or cb with her thoughts

## 2023-01-18 NOTE — TELEPHONE ENCOUNTER
Patient sent my chart message 1/18:    Sorry I missed your call      There’s not necessarily a better time to call as I am usually available, however today I honestly fell asleep  I really have not been feeling good and ended up napping      The message you passed along would be easier to discuss through a phone call  Would I be seeing an ophthalmologist that is a [facility] provider? I have been seeing a private ophthalmologist, but I don’t think he ever communicated with you at all in the past   I’m also not sure if he would be on the same page as Dr Bailey Booker as I was on a higher dose of Topamax in the past   That ophthalmologist cut it down because he thought I was having problems focusing due to that dose    Cutting it down did not help my vision, but I never got put back to a higher dose      I’m not the most comfortable with adding psych meds on top of everything my psychiatrist already prescribes, but I will  the Depekote and try that if there’s any hope I will feel a bit better      Thank you,  Tenisha Sun

## 2023-01-19 DIAGNOSIS — E66.01 MORBID OBESITY (HCC): ICD-10-CM

## 2023-01-19 DIAGNOSIS — R73.03 PREDIABETES: ICD-10-CM

## 2023-01-19 NOTE — TELEPHONE ENCOUNTER
Name of medication/s patient is requesting to be refilled VICTOZA    Medication dosage 1 8mg    How often is medication being taken daily      Is patient requesting 30 or 90 day supply, 90    Name of Pharmacy [id number]  [address] [address]    Last Visit 11/22/2022  Next Visit Visit date not found

## 2023-01-19 NOTE — TELEPHONE ENCOUNTER
Patient confirmed no chance of pregnancy  She is receptive to MRI and will call to schedule same; she is also aware depakote sent to her pharmacy and she will  today  She is receptive to any medication Dr Tai Rivera recommends including increasing dose of topamax  She said her current opthamologist actually decreased her topamax from 300 mg to 100 mg daily due to visual changes; asking which opthamologist Dr Tai Rivera recommends  Thank you

## 2023-01-20 NOTE — TELEPHONE ENCOUNTER
I called patient to discuss; reached vm, left message that I was going to send Dr Jimenes Gravely response via my chart

## 2023-01-20 NOTE — TELEPHONE ENCOUNTER
Response from Dr Timi Taylor:    Ming Jacob is not a specific ophthalmologist that she would need to see  Lila Hunting question for the ophthalmologist is if there is any evidence of papilledema (swelling in the back of the eye) during the exams   This is routinely evaluated by them, so if she has seen them recently we probably just need to request the office note   If it has been quite a while then getting back to them for a repeat eval may make sense   If she prefers to see a different ophthalmologist I often recommend Dr Skye Larsen who I feel is quite good   I can enter that as a referral in our system, just let me know her preference and if she has seen her ophthalmologist reasonably recently please get the info so we can request those records  In terms of the Topamax, did her vision improve/change after adjusting it?       If her Ophthalmologist had concern for high pressure they would not typically decrease the dose so the fact that they recommended that before is a little reassuring but we would want to make sure   If they feel that there is an issue with increasing the Topamax dose related to vision of course we would take a different approach, it is just an option that would not require a pivot to a new medication but first we need to see if there really is evidence to support a diagnosis of high pressure/IIH before adjusting either way

## 2023-01-21 NOTE — TELEPHONE ENCOUNTER
Patient calling to see if she could have a sooner appointment  Juan F Liang that her month of August was out of control and had migraines for the majority of the month  Said that the medication that works the best right now in toradol with compazine, but does not break the migraine  She was prescribed toradol in the ER on 5/27  Said that she thinks her psych meds make it harder for her to get the proper migraine medications but thinks her psychiatrist would work with our office to get her migraines under control  Patient put on wait list as there is not a sooner appointment available  Medications:  Topiramate: 100 mg in AM and 200 mg at night    Compazine: 10 mg  Please advise    Ok to leave a detailed message Portuguese

## 2023-01-23 DIAGNOSIS — G93.2 IDIOPATHIC INTRACRANIAL HYPERTENSION: ICD-10-CM

## 2023-01-24 RX ORDER — FUROSEMIDE 40 MG/1
TABLET ORAL
Qty: 135 TABLET | Refills: 2 | Status: SHIPPED | OUTPATIENT
Start: 2023-01-24

## 2023-01-26 ENCOUNTER — OFFICE VISIT (OUTPATIENT)
Dept: SLEEP CENTER | Facility: CLINIC | Age: 38
End: 2023-01-26

## 2023-01-26 ENCOUNTER — HOSPITAL ENCOUNTER (OUTPATIENT)
Dept: MRI IMAGING | Facility: HOSPITAL | Age: 38
End: 2023-01-26
Attending: PSYCHIATRY & NEUROLOGY

## 2023-01-26 VITALS
SYSTOLIC BLOOD PRESSURE: 132 MMHG | BODY MASS INDEX: 45.99 KG/M2 | HEIGHT: 67 IN | TEMPERATURE: 98 F | DIASTOLIC BLOOD PRESSURE: 82 MMHG | HEART RATE: 75 BPM | OXYGEN SATURATION: 99 % | WEIGHT: 293 LBS

## 2023-01-26 DIAGNOSIS — E66.01 MORBID OBESITY WITH BMI OF 60.0-69.9, ADULT (HCC): ICD-10-CM

## 2023-01-26 DIAGNOSIS — G93.2 IIH (IDIOPATHIC INTRACRANIAL HYPERTENSION): ICD-10-CM

## 2023-01-26 DIAGNOSIS — F50.81 BINGE-EATING DISORDER, SEVERE: ICD-10-CM

## 2023-01-26 DIAGNOSIS — I10 PRIMARY HYPERTENSION: Chronic | ICD-10-CM

## 2023-01-26 DIAGNOSIS — G93.2 INTRACRANIAL HYPERTENSION: ICD-10-CM

## 2023-01-26 DIAGNOSIS — R09.81 NASAL CONGESTION: ICD-10-CM

## 2023-01-26 DIAGNOSIS — F63.0 GAMBLING DISORDER, MODERATE: ICD-10-CM

## 2023-01-26 DIAGNOSIS — K21.9 GASTROESOPHAGEAL REFLUX DISEASE WITHOUT ESOPHAGITIS: ICD-10-CM

## 2023-01-26 DIAGNOSIS — J30.9 ALLERGIC RHINITIS, UNSPECIFIED SEASONALITY, UNSPECIFIED TRIGGER: ICD-10-CM

## 2023-01-26 DIAGNOSIS — G47.33 OBSTRUCTIVE SLEEP APNEA: Primary | ICD-10-CM

## 2023-01-26 DIAGNOSIS — F42.2 MIXED OBSESSIONAL THOUGHTS AND ACTS: ICD-10-CM

## 2023-01-26 RX ADMIN — GADOBUTROL 18 ML: 604.72 INJECTION INTRAVENOUS at 18:44

## 2023-01-26 NOTE — PROGRESS NOTES
Follow-Up Note - Sleep Center   Mónica Dee  44 y o  female  :1985  YCD:[id number]  DOS:2023    CC: I saw this patient for follow-up in clinic today for mild obstructive sleep apnea, Coexisting Sleep and Medical Problems  She is here at behest of her neurologist because of headaches  She was previously under care of Dr Teena Ravi, was diagnosed with obstructive sleep apnea in the past and prescribed CPAP but discontinued use because of anxiety about meeting compliance requirements  PSG done 3/2020: apnea/hypopnea index (AHI) of 5 7 events per hour of sleep  The AHI in the supine position was 7 3  The AHI during REM sleep was 20 3  Moderate to loud intensity snoring was noted  The baseline oxygen saturation with the patient awake was 96 6%  The mean oxygen saturation throughout thestudy was 96 7%  The amount of sleep time below 90% was 0 1 minutes  The lowest oxygen saturation was 89 0%  PFSH, Problem List, Medications & Allergies were reviewed in EMR  Interval changes: None reported  She  has a past medical history of Anorexia nervosa in remission, Anxiety, Back pain, Bipolar disorder ([facility] ), Depression, Esophageal reflux (8/15/2013), Hypertension, Hypothyroid, Idiopathic intracranial hypertension, Lumbar degenerative disc disease (2014), Migraine, Obesity, Obsessive-compulsive disorder, Overactive bladder, Psychiatric disorder, Restless leg syndrome, controlled, Seasonal allergies, Suicide and self-inflicted injury ([facility] ), and Transcranial Magnetic Stimulation (2021)      She has a current medication list which includes the following prescription(s): celecoxib, vitamin d3, clomipramine, divalproex sodium, emgality, furosemide, gabapentin, insulin pen needle, levothyroxine, linzess, liraglutide, loratadine, lorazepam, metformin, multivitamin, nurtec, omeprazole, propranolol, ropinirole, sertraline, spironolactone, sumatriptan, topiramate, vraylar, vyvanse, and hydrocortisone-acetic acid, and the following Facility-Administered Medications: botulinum toxin type a and botulinum toxin type a     HPI: Her headaches occur  Apically they are occipital and appear to be tension type headaches  She reports nasal congestion for which she is due to see ENT  She has had approximately 30 pounds weight reduction since her last study  Bed partner does not report snoring  He is not aware of breathing difficulties during sleep  She has restless legs syndrome occurring around once a week for which she is on Requip regularly (being prescribed by her PCP)  She has gambling disorder for which she is working with her therapist and states she does not have an alternative to Requip  Sleep Routine: Jamar Dodge reports getting 8-9 hrs sleep; she has no difficulty initiating or maintaining sleep   She arises spontaneously and feels refreshed and denies awakening with headache  Karyna Paulson denies excessive daytime sleepiness, or napping  She rated [herself] at Total score: 7 /24 on the [place] Sleepiness Scale  Habits:[ reports that she has never smoked  She has never used smokeless tobacco ], [ reports that she does not currently use alcohol ], [ reports no history of drug use ], Caffeine use: limited, Exercise routine: "not enough"  ROS: reviewed significant for ongoing weight reduction and working with weight management  He reported no respiratory or cardiac symptoms  She feels binge eating is under control  She feels OCD, anxiety and depression are controlled on current medication  EXAM: /82   Pulse 75   Temp 98 °F (36 7 °C)   Ht 5' 7" (1 702 m)   Wt (!) 180 kg (397 lb)   SpO2 99%   BMI 62 18 kg/m²     Patient is well groomed; well appearing  CNS: Alert, orientated, clear & coherent speech  Psych: cooperative and in no distress  Mental state: Appears anxious  H&N: EOMI; NC/AT: No facial pressure marks, no rashes      Skin/Extrem: col & hydration normal; no edema  Resp: Respiratory effort is normal  Physical findings otherwise essentially unchanged from previous  IMPRESSION: Diagnoses, Problem List Items & Comorbidities Addressed this Visit   1  Obstructive sleep apnea  Ambulatory Referral to Sleep Medicine    PAP DME Pressure Change    PAP DME Resupply/Reorder      2  Nasal congestion        3  Gastroesophageal reflux disease without esophagitis        4  Allergic rhinitis, unspecified seasonality, unspecified trigger        5  Primary hypertension        6  Mixed obsessional thoughts and acts        7  Intracranial hypertension        8  Gambling disorder, moderate        9  Binge-eating disorder, severe        10  Morbid obesity with BMI of 60 0-69 9, adult ([facility] )            PLAN:  1  I reviewed results of prior studies and physiologic data with the patient  2  Notified of [employer] devices recall and their recommendation to discontinue use  3  Risks of discontinuing PAP vs continuing while awaiting resolution were explained and patient indicates understanding  4  I discussed treatment options with risks and benefits  She declined a repeat diagnostic study that was recommended in view of her weight loss and paucity of symptoms  5  Patient elected to continue using Pap while awaiting remediation  6  Instructed  to register machine with [employer] & track progress on [place]  7  Care of equipment, methods to improve comfort using PAP and importance of compliance with therapy were discussed  8  Instructed not to use ozone based or other unapproved  cleaning devices  9  Pressure settin-[id number]      10  Rx provided to replace supplies and Care coordinated with DME provider  11  She is already on gabapentin so treatment options for restless leg symptoms are limited  I advised nonpharmacologic measures but she wanted to continue Requip and feels she is able to manage her gambling    12        She is under care of psychiatry, neurology and plans to see ENT  13  Encouraged to persist with efforts at  weight reduction  14  Follow-up is advised in 3-4 months to monitor progress, compliance and to adjust therapy  Sincerely,     Authenticated electronically on 98/07/71   Board Certified Specialist     Portions of the record may have been created with voice recognition software  Occasional wrong word or "sound a like" substitutions may have occurred due to the inherent limitations of voice recognition software  There may also be notations and random deletions of words or characters from malfunctioning software  Read the chart carefully and recognize, using context, where substitutions/deletions have occurred

## 2023-01-26 NOTE — PATIENT INSTRUCTIONS

## 2023-01-27 ENCOUNTER — TELEPHONE (OUTPATIENT)
Dept: FAMILY MEDICINE CLINIC | Facility: CLINIC | Age: 38
End: 2023-01-27

## 2023-01-27 ENCOUNTER — TELEMEDICINE (OUTPATIENT)
Dept: NEUROLOGY | Facility: CLINIC | Age: 38
End: 2023-01-27

## 2023-01-27 ENCOUNTER — TELEPHONE (OUTPATIENT)
Dept: SLEEP CENTER | Facility: CLINIC | Age: 38
End: 2023-01-27

## 2023-01-27 ENCOUNTER — TELEPHONE (OUTPATIENT)
Dept: NEUROLOGY | Facility: CLINIC | Age: 38
End: 2023-01-27

## 2023-01-27 VITALS — HEIGHT: 67 IN | BODY MASS INDEX: 45.99 KG/M2 | WEIGHT: 293 LBS

## 2023-01-27 DIAGNOSIS — G44.229 CHRONIC TENSION-TYPE HEADACHE, NOT INTRACTABLE: ICD-10-CM

## 2023-01-27 DIAGNOSIS — G43.709 CHRONIC MIGRAINE WITHOUT AURA WITHOUT STATUS MIGRAINOSUS, NOT INTRACTABLE: Primary | ICD-10-CM

## 2023-01-27 DIAGNOSIS — G43.719 INTRACTABLE CHRONIC MIGRAINE WITHOUT AURA AND WITHOUT STATUS MIGRAINOSUS: ICD-10-CM

## 2023-01-27 LAB
DME PARACHUTE DELIVERY DATE REQUESTED: NORMAL
DME PARACHUTE ITEM DESCRIPTION: NORMAL
DME PARACHUTE ORDER STATUS: NORMAL
DME PARACHUTE SUPPLIER NAME: NORMAL
DME PARACHUTE SUPPLIER PHONE: NORMAL

## 2023-01-27 RX ORDER — CARIPRAZINE 3 MG/1
3 CAPSULE, GELATIN COATED ORAL DAILY
COMMUNITY
Start: 2023-01-16

## 2023-01-27 RX ORDER — TOPIRAMATE 100 MG/1
TABLET, FILM COATED ORAL
Qty: 90 TABLET | Refills: 3 | Status: SHIPPED | OUTPATIENT
Start: 2023-01-27

## 2023-01-27 RX ORDER — LOTEPREDNOL ETABONATE 5 MG/ML
1 SUSPENSION/ DROPS OPHTHALMIC 2 TIMES DAILY
COMMUNITY
Start: 2022-12-21

## 2023-01-27 RX ORDER — FOLIC ACID 1 MG/1
TABLET ORAL
COMMUNITY
Start: 2023-01-27

## 2023-01-27 RX ORDER — PROPRANOLOL HYDROCHLORIDE 20 MG/1
20 TABLET ORAL EVERY 12 HOURS SCHEDULED
COMMUNITY
Start: 2023-01-27

## 2023-01-27 RX ORDER — LIFITEGRAST 50 MG/ML
SOLUTION/ DROPS OPHTHALMIC
COMMUNITY
Start: 2022-12-09

## 2023-01-27 NOTE — PATIENT INSTRUCTIONS
Increase Topamax to 100 mg in am and add 50 mg at bedtime for 1-3 weeks if no side effects can increase to 100 mg twice a day     Nerve block injections   Follow up in 3 months

## 2023-01-27 NOTE — TELEPHONE ENCOUNTER
Elvia Pavon stated she had dry skin on her big toe, so she pulled if off  However, it now seems to be infected and is requesting an abx  I informed her that Dr Elizabeth Kendrick need to see the infected area, so I ofered an an appt  Elizabeth stated that she has two other appts today, so asked for something between 10:30 and 3:30  Our only available time is 3 pm  She asked if  could send in an abx and she could come in for a follow up Monday or if she would need to go to urgent care later  I asked her to send over a picture of infected area via [id number], so  could at least have a way to view infected area and determine best course of action

## 2023-01-27 NOTE — ASSESSMENT & PLAN NOTE
Once in 2 weeks, all over in terms of location with foggy thinking+ p/p sensitivity and also osmophobia   + Nausea   This is throbbing in character   Prior was 5-7 days  Sayra Princeman was every other week, not difficulty to consider how often but approx every 10 days and less severe and shorter   She just feels lousy with them  Plan-  Increase Topamax to 100 mg in am and add 50 mg at bedtime for 1-3 weeks if no side effects can increase to 100 mg twice a day  Continue Emgality   Continue Gabapentin 300 mg in am and 900 mg at bedtime  Continue Nurtec 75 mg every other day   Continue Celebrex 200 mg BID  Propranolol 20 mg BID, Lasix

## 2023-01-27 NOTE — TELEPHONE ENCOUNTER
Called the radiology reading room to see if they can push the results through for the MRI that was done yesterday since the patient has an appt today with Dr Venecia Ricci

## 2023-01-27 NOTE — ASSESSMENT & PLAN NOTE
Daily Headache: worsened, daily, afternoon, few hours, start back of neck, band like sensation, pressure type, 6-7/10, sometimes nausea  Worsened (Last visit 2/week)    Plan-  Continue medications as listed above for headache   Increase Topamax to 100 mg in am and add 50 mg at bedtime for 1-3 weeks if no side effects can increase to 100 mg twice a day     Nerve block injections   Follow up in 3 months

## 2023-01-27 NOTE — PROGRESS NOTES
Virtual Regular Visit    Verification of patient location:    Patient is located in the following state in which I hold an active license [place]      Assessment/Plan:    Problem List Items Addressed This Visit        Cardiovascular and Mediastinum    Chronic migraine without aura without status migrainosus, not intractable - Primary     Once in 2 weeks, all over in terms of location with foggy thinking+ p/p sensitivity and also osmophobia   + Nausea   This is throbbing in character   Prior was 5-7 days  [place] was every other week, not difficulty to consider how often but approx every 10 days and less severe and shorter   She just feels lousy with them  Plan-  Increase Topamax to 100 mg in am and add 50 mg at bedtime for 1-3 weeks if no side effects can increase to 100 mg twice a day  Continue Emgality   Continue Gabapentin 300 mg in am and 900 mg at bedtime  Continue Nurtec 75 mg every other day   Continue Celebrex 200 mg BID  Propranolol 20 mg BID, Lasix  Relevant Medications    propranolol (INDERAL) 20 mg tablet    topiramate (TOPAMAX) 100 mg tablet    Migraine headache    Relevant Medications    propranolol (INDERAL) 20 mg tablet    topiramate (TOPAMAX) 100 mg tablet       Nervous and Auditory    Chronic tension-type headache, not intractable     Daily Headache: worsened, daily, afternoon, few hours, start back of neck, band like sensation, pressure type, 6-7/10, sometimes nausea  Worsened (Last visit 2/week)    Plan-  Continue medications as listed above for headache   Increase Topamax to 100 mg in am and add 50 mg at bedtime for 1-3 weeks if no side effects can increase to 100 mg twice a day     Nerve block injections   Follow up in 3 months          Relevant Medications    propranolol (INDERAL) 20 mg tablet    topiramate (TOPAMAX) 100 mg tablet            Reason for visit is   Chief Complaint   Patient presents with   • Virtual Regular Visit        Encounter provider Fredy Marc MD    Provider located at [address]  [place] [address]      Recent Visits  No visits were found meeting these conditions  Showing recent visits within past 7 days and meeting all other requirements  Today's Visits  Date Type Provider Dept   01/27/23 Telephone Jose Luna 5   01/27/23 Telephone Daria Hernandez MD [facility]   01/27/23 Telemedicine Antonella De Leon  [facility] today's visits and meeting all other requirements  Future Appointments  No visits were found meeting these conditions  Showing future appointments within next 150 days and meeting all other requirements       The patient was identified by name and date of birth  Dillon Chavez was informed that this is a telemedicine visit and that the visit is being conducted through the [employer]  She agrees to proceed     My office door was closed  No one else was in the room  She acknowledged consent and understanding of privacy and security of the video platform  The patient has agreed to participate and understands they can discontinue the visit at any time  Patient is aware this is a billable service  Subjective  Dillon Chavez is a 44 y o  female w h/o DM, hypothyroidism, GERD, chronic ethmoid sinusitis, constipation is here as a follow-up for her headaches    Last visit 8/2022 with DR Austin Arevalo  Since last visit, patient reports her migraine headaches are about the same, she still gets them once in 2 weeks  Reports her daily headaches have worsened and now she gets them almost every day compared to last visit when she was getting twice per week     More details see below    Original migraine: Once in 2 weeks, all over in terms of location with foggy thinking+ p/p sensitivity and also osmophobia   + Nausea   This is throbbing in character   Prior was 5-7 days   Prior was every other week, not difficulty to consider how often but approx every 10 days and less severe and shorter   She just feels lousy with them      Daily Headache: worsened, daily, afternoon, few hours, start back of neck, band like sensation, pressure type, 6-7/10, sometimes nausea  Worsened (Last visit 2/week)    Saw sleep medicine recently, CAIO not as bad, she would likely get her machine replaced  She reports anxiety and OCD has worsened since August, working with therapist and Psychiatrist      Lucinda Montero for 4 nights not sure if it was helpful      PREVENTATIVE:  - magnesium, B2, vitamin-D  - Claritin  - lisinopril  - Wellbutrin, Zoloft, haloperidol, lithium, Latuda, Cogentin, Risperdal  - Topamax, gabapentin, Lamictal, diamox, cyclobenzabrine, tizanidine  - Indomethacin  - botox  - tried the cephaly device and then did not think it was helping     Current: Emgality (better than botox) she did it today, Gabapentin 300 mg in am and 900 mg at bedtime, Topamax 100 mg daily, Nurtec (preventive), Celebrex 200 mg BID     Propranolol 20 mg BID, Lasix       ABORTIVE:  - Toradol, ibuprofen, diclofenac- not using  - hydrocodone- not using  - Ativan- not using       - Nurtec (not helpful)     CAIO: She has had the machine but had difficulty with anxiety associated with it    Currently pending a follow up at the sleep center for a repeat evaluation        Past Medical History:   Diagnosis Date   • Anorexia nervosa in remission    • Anxiety    • Back pain    • [facility] disorder ([facility] )    • Depression    • Esophageal reflux 8/15/2013   • Hypertension    • Hypothyroid    • Idiopathic intracranial hypertension    • Lumbar degenerative disc disease 5/8/2014   • Migraine    • Obesity    • Obsessive-compulsive disorder    • Overactive bladder    • Psychiatric disorder    • Restless leg syndrome, controlled    • Seasonal allergies    • Suicide and self-inflicted injury ([facility] )    • Transcranial Magnetic Stimulation 09/06/2021       Past Surgical History:   Procedure Laterality Date   • DENTAL SURGERY      wisdom teeth-at [age] years  Other surgery dental extraxtion of bone spur (10 years ago)   • IR LUMBAR PUNCTURE  02/26/2019   • SINUS ENDOSCOPY     • TONSILLECTOMY         Current Outpatient Medications   Medication Sig Dispense Refill   • celecoxib (CeleBREX) 200 mg capsule TAKE 1 CAPSULE BY MOUTH TWICE A DAY 60 capsule 4   • Cholecalciferol (VITAMIN D3) 5000 units CAPS Take 5,000 Units by mouth daily     • clomiPRAMINE (ANAFRANIL) 50 mg capsule Take by mouth TAKE 2 CAPSULES IN AM AND 3 CAPSULES IN PM     • Emgality 120 MG/ML SOAJ INJECT 120 MG UNDER THE SKIN EVERY 30 DAYS  3 mL 1   • folic acid (FOLVITE) 1 mg tablet      • furosemide (LASIX) 40 mg tablet TAKE 1/2 TABLET BY MOUTH DAILY OR 1 AND 1/2 TABS IF WEIGHT GAIN OF 3 POUNDS IN ONE  tablet 2   • gabapentin (NEURONTIN) 600 MG tablet TAKE 1/2 TABLET DAILY IN AM AND 1 AND 1/2 TABLET AT BEDTIME     • Insulin Pen Needle 32G X 6 MM MISC Use once daily as directed 90 each 0   • levothyroxine 125 mcg tablet TAKE 1 TABLET BY MOUTH EVERY DAY IN THE MORNING 90 tablet 1   • Linzess 290 MCG CAPS TAKE 1 CAPSULE BY MOUTH EVERY DAY 30 capsule 5   • liraglutide (VICTOZA) injection Inject 1 8 mg subcutaneously once per day 9 mL 1   • loratadine (CLARITIN) 10 mg tablet Take 10 mg by mouth daily     • LORazepam (ATIVAN) 0 5 mg tablet      • loteprednol etabonate (LOTEMAX) 0 5 % ophthalmic suspension Administer 1 drop to both eyes 2 (two) times a day     • metFORMIN (GLUCOPHAGE) 500 mg tablet TAKE 1 TABLET BY MOUTH EVERY DAY WITH BREAKFAST 90 tablet 0   • Multiple Vitamin (MULTIVITAMIN) capsule Take 1 capsule by mouth daily     • Nurtec 75 MG TBDP TAKE 75 MG BY MOUTH EVERY OTHER DAY    IF THERE IS A MIGRAINE ON A NON-NURTEC DAY, CAN TAKE A DOSE AND SKIP THE FOLLOWING DAY 16 tablet 1   • omeprazole (PriLOSEC) 20 mg delayed release capsule Take 1 capsule (20 mg total) by mouth 2 (two) times a day 180 capsule 1   • propranolol (INDERAL) 20 mg tablet Take 20 mg by mouth every 12 (twelve) hours     • rOPINIRole (REQUIP) 4 mg tablet TAKE 1 TABLET BY MOUTH 2 TIMES A DAY  180 tablet 1   • sertraline (ZOLOFT) 100 mg tablet Take 100 mg by mouth daily   0   • spironolactone (ALDACTONE) 50 mg tablet TAKE 1 TABLET BY MOUTH EVERY DAY 90 tablet 1   • SUMAtriptan (IMITREX) 100 mg tablet 1 tab as needed for migraine, can repeat once in 2 hours, max 200mg in 24 hours  12 tablet 3   • topiramate (TOPAMAX) 100 mg tablet Take 100 mg twice a day 90 tablet 3   • Vraylar 3 MG capsule Take 3 mg by mouth daily Daily in the evening     • Vraylar 6 MG capsule Take 6 mg by mouth daily Daily in the morning     • Vyvanse 50 MG capsule Take 50 mg by mouth daily     • Xiidra 5 % op solution      • hydrocortisone-acetic acid (VOSOL-HC) otic solution Administer 3 drops into both ears every 6 (six) hours (Patient not taking: Reported on 1/5/2023) 10 mL 0     Current Facility-Administered Medications   Medication Dose Route Frequency Provider Last Rate Last Admin   • Botulinum Toxin Type A SOLR 200 Units  200 Units Injection Q3 Months Arlet Doyle PA-C   200 Units at 04/01/21 1453   • Botulinum Toxin Type A SOLR 200 Units  200 Units Injection Q3 Months Yamila Holder MD   200 Units at 07/28/21 1211        Allergies   Allergen Reactions   • Doxycycline      Pt refuses d/t remote H/O intracranial hypertension    • Other      Seasonal allergies        Review of Systems   Constitutional: Negative  Negative for appetite change and fever  HENT: Negative  Negative for hearing loss, tinnitus, trouble swallowing and voice change  Eyes: Negative  Negative for photophobia, pain and visual disturbance  Respiratory: Negative  Negative for shortness of breath  Cardiovascular: Negative  Negative for palpitations  Gastrointestinal: Negative  Negative for nausea and vomiting  Endocrine: Negative  Negative for cold intolerance  Genitourinary: Negative  Negative for dysuria, frequency and urgency  Musculoskeletal: Negative  Negative for gait problem, myalgias and neck pain  Skin: Negative  Negative for rash  Allergic/Immunologic: Negative  Neurological: Positive for dizziness, light-headedness and headaches  Negative for tremors, seizures, syncope, facial asymmetry, speech difficulty, weakness and numbness  Hematological: Negative  Does not bruise/bleed easily  Psychiatric/Behavioral: Negative  Negative for confusion, hallucinations and sleep disturbance  Video Exam    Vitals:    01/27/23 1545   Weight: (!) 180 kg (397 lb)   Height: 5' 7" (1 702 m)       GENERAL MEDICAL EXAMINATION:  General appearance: alert, in no apparent distress  Appropriately dressed and groomed  Conversing and interacting appropriately  Eyes: Sclera are non-injected  Ears, nose, Mouth, Throat: Mucous membranes are moist    Resp: Breathing comfortably on RA   Musculoskeletal: No evidence of deformities  No contractures  Skin: No visible rashes  Well perfused  Psych: normal and appropriate affect     Mental Status:  Alert and oriented to person place and time  Able to relate history without difficulty  Attentive to conversation  Language is fluent and appropriate with normal prosody  There were no paraphasic errors  Speech was not dysarthric  Able to follow both midline and appendicular commands  General Neurology examination:   EOMI, Face activates symmetrically  Normal bulk throughout  Patient moves all 4 extremities equally and antigravity  No pronator drift  There were no adventitious movements noted  Finger to nose without dysmetria bilaterally  No intention tremor  RYAN were symmetric with regard to antwon and speed        I spent 30 minutes directly with the patient during this visit

## 2023-01-28 NOTE — PROGRESS NOTES
Chemodenervation     Date/Time 4/1/2021 2:21 PM     Performed by  Nannette Bajwa PA-C     Authorized by Nannette Bajwa PA-C        Pre-procedure details      Prepped With: Alcohol     Anesthesia  (see MAR for exact dosages): Anesthesia method:  None   Procedure details     Position:  Upright   Botox     Botox Type:  Type A    Brand:  Botox    mL's of Botulinum Toxin:  155    Final Concentration per CC:  200 units    Needle Gauge:  30 G 2 5 inch   Procedures     Botox Procedures: chronic headache      Indications: migraines     Injection Location      Head / Face:  L superior cervical paraspinal, R superior cervical paraspinal, L , R , L frontalis, R frontalis, L medial occipitalis, R medial occipitalis, procerus, L temporalis, R temporalis, L superior trapezius and R superior trapezius    L  injection amount:  5 unit(s)    R  injection amount:  5 unit(s)    L lateral frontalis:  5 unit(s)    R lateral frontalis:  5 unit(s)    L medial frontalis:  5 unit(s)    R medial frontalis:  5 unit(s)    L temporalis injection amount:  20 unit(s)    R temporalis injection amount:  20 unit(s)    Procerus injection amount:  5 unit(s)    L medial occipitalis injection amount:  15 unit(s)    R medial occipitalis injection amount:  15 unit(s)    L superior cervical paraspinal injection amount:  10 unit(s)    R superior cervical paraspinal injection amount:  10 unit(s)    L superior trapezius injection amount:  15 unit(s)    R superior trapezius injection amount:  15 unit(s)   Total Units     Total units used:  155    Total units discarded:  45   Post-procedure details      Chemodenervation:  Chronic migraine    Facial Nerve Location[de-identified]  Bilateral facial nerve    Patient tolerance of procedure:   Tolerated well, no immediate complications English

## 2023-01-30 ENCOUNTER — TELEPHONE (OUTPATIENT)
Dept: NEUROLOGY | Facility: CLINIC | Age: 38
End: 2023-01-30

## 2023-01-30 ENCOUNTER — OFFICE VISIT (OUTPATIENT)
Dept: FAMILY MEDICINE CLINIC | Facility: CLINIC | Age: 38
End: 2023-01-30

## 2023-01-30 VITALS
SYSTOLIC BLOOD PRESSURE: 136 MMHG | DIASTOLIC BLOOD PRESSURE: 68 MMHG | HEART RATE: 83 BPM | TEMPERATURE: 97.1 F | OXYGEN SATURATION: 99 % | BODY MASS INDEX: 45.99 KG/M2 | WEIGHT: 293 LBS | HEIGHT: 67 IN

## 2023-01-30 DIAGNOSIS — M79.674 PAIN OF TOE OF RIGHT FOOT: Primary | ICD-10-CM

## 2023-01-30 DIAGNOSIS — I10 HYPERTENSION, UNSPECIFIED TYPE: ICD-10-CM

## 2023-01-30 LAB

## 2023-01-30 RX ORDER — SPIRONOLACTONE 50 MG/1
TABLET, FILM COATED ORAL
Qty: 90 TABLET | Refills: 1 | Status: SHIPPED | OUTPATIENT
Start: 2023-01-30

## 2023-01-30 NOTE — PROGRESS NOTES
Assessment/Plan:    No problem-specific Assessment & Plan notes found for this encounter  Diagnoses and all orders for this visit:    Pain of toe of right foot  - Patient with worsening right toe pain and discharge, consistent with paronychia after having trimmed nails last week  - Patient to trial antiseptic soaks and topical mupirocin at this time, she will RTO in 1 week if symptoms worsen or have not improved      - mupirocin (BACTROBAN) 2 % ointment; Apply topically 3 (three) times a day        Subjective:      Patient ID: Shira Che is a 44 y o  female  Toe Pain   The incident occurred 3 to 5 days ago (Patient reports she was cutting her toenails Wednesday  Then by Thursday night she noted a piece of skin sticking out  She then pulled this skin out )  There was no injury mechanism  The quality of the pain is described as aching  The pain is mild  The pain has been constant since onset  Pertinent negatives include no inability to bear weight  Exacerbated by: Patient reports she noticed clear liquid oozing on Friday  Now presents as she noted yellow discharge over the weekend  The following portions of the patient's history were reviewed and updated as appropriate: allergies, current medications, past family history, past medical history, past social history, past surgical history and problem list     Review of Systems   Constitutional: Negative for chills and fever  Respiratory: Negative for shortness of breath  Cardiovascular: Negative for chest pain  Skin: Positive for wound  Objective:      /68 (BP Location: Right arm)   Pulse 83   Temp (!) 97 1 °F (36 2 °C) (Tympanic)   Ht 5' 7" (1 702 m)   Wt (!) 180 kg (396 lb)   SpO2 99%   BMI 62 02 kg/m²          Physical Exam  Constitutional:       Appearance: Normal appearance  HENT:      Head: Normocephalic and atraumatic        Right Ear: External ear normal       Left Ear: External ear normal  Musculoskeletal:        Feet:    Neurological:      Mental Status: She is alert and oriented to person, place, and time     Psychiatric:         Mood and Affect: Mood normal          Behavior: Behavior normal

## 2023-01-30 NOTE — TELEPHONE ENCOUNTER
Called patient to schedule a nerve block per Juliet Chappell and Jorge Sweet request  Scheduled for 2/16 at 4pm in the [facility] office

## 2023-02-06 DIAGNOSIS — E66.01 MORBID OBESITY (HCC): ICD-10-CM

## 2023-02-06 DIAGNOSIS — R73.03 PREDIABETES: ICD-10-CM

## 2023-02-06 NOTE — TELEPHONE ENCOUNTER
Patient needs a refill of the needles for the victoza needles sent to [facility] in [place] on [address]

## 2023-02-16 ENCOUNTER — HOSPITAL ENCOUNTER (OUTPATIENT)
Dept: INFUSION CENTER | Facility: HOSPITAL | Age: 38
Discharge: HOME | End: 2023-02-16
Attending: PSYCHIATRY & NEUROLOGY

## 2023-02-16 ENCOUNTER — PATIENT MESSAGE (OUTPATIENT)
Dept: FAMILY MEDICINE CLINIC | Facility: CLINIC | Age: 38
End: 2023-02-16

## 2023-02-16 ENCOUNTER — OFFICE VISIT (OUTPATIENT)
Dept: NEUROLOGY | Facility: CLINIC | Age: 38
End: 2023-02-16

## 2023-02-16 ENCOUNTER — TELEPHONE (OUTPATIENT)
Dept: FAMILY MEDICINE CLINIC | Facility: CLINIC | Age: 38
End: 2023-02-16

## 2023-02-16 VITALS
TEMPERATURE: 97.8 F | DIASTOLIC BLOOD PRESSURE: 60 MMHG | BODY MASS INDEX: 62.02 KG/M2 | HEIGHT: 67 IN | HEART RATE: 76 BPM | SYSTOLIC BLOOD PRESSURE: 122 MMHG

## 2023-02-16 DIAGNOSIS — M54.2 CERVICALGIA: ICD-10-CM

## 2023-02-16 DIAGNOSIS — G43.709 CHRONIC MIGRAINE WITHOUT AURA WITHOUT STATUS MIGRAINOSUS, NOT INTRACTABLE: Primary | ICD-10-CM

## 2023-02-16 NOTE — TELEPHONE ENCOUNTER
Osorio Sherman was in about 2 weeks ago to discuss her concerns about her toe  At the previous visit, topical antibiotic ointment was prescribed and she was instructed to come/call back if it did not get better  She shared that at first, there was a some improvement as the pain minimized  However, it has not continued to get any better for some time and she would like to see if  has any further recommendations for the toe  I offered a same day appointment, so  could take a look at [facility]  However, she shared that she already has two appointments today  I then asked her to send  a picture so she could take a look at her toe via [facility] and await further recommendations

## 2023-02-16 NOTE — PROGRESS NOTES
Review of Systems   Constitutional: Negative  Negative for appetite change and fever  HENT: Negative  Negative for hearing loss, tinnitus, trouble swallowing and voice change  Eyes: Negative  Negative for photophobia, pain and visual disturbance  Respiratory: Negative  Negative for shortness of breath  Cardiovascular: Negative  Negative for palpitations  Gastrointestinal: Negative  Negative for nausea and vomiting  Endocrine: Negative  Negative for cold intolerance  Genitourinary: Negative  Negative for dysuria, frequency and urgency  Musculoskeletal: Negative  Negative for gait problem, myalgias and neck pain  Skin: Negative  Negative for rash  Allergic/Immunologic: Negative  Neurological: Positive for headaches  Negative for dizziness, tremors, seizures, syncope, facial asymmetry, speech difficulty, weakness, light-headedness and numbness  Hematological: Negative  Does not bruise/bleed easily  Psychiatric/Behavioral: Negative  Negative for confusion, hallucinations and sleep disturbance

## 2023-02-17 ENCOUNTER — TELEPHONE (OUTPATIENT)
Dept: NEUROLOGY | Facility: CLINIC | Age: 38
End: 2023-02-17

## 2023-02-17 NOTE — PROGRESS NOTES
Nerve block    Date/Time: 2/17/2023 12:06 AM  Performed by: Jazzmine Stauffer MD  Authorized by: Jazzmine Stauffer MD   Universal Protocol:  Consent: Written consent obtained  Consent given by: patient      Indications:     Indications:  Pain relief  Location:     Body area:  Head (auriculotemporal 1cc eah side)    Nerve type:  Peripheral    Laterality:  Bilateral  Pre-procedure details:     Skin preparation:  Alcohol  Procedure details (see MAR for exact dosages): Block needle gauge:  27 G    Anesthetic injected:  Lidocaine 1% w/o epi and bupivacaine 0 5% w/o epi (2cc each)    Steroid injected:  Triamcinolone (40mg/ml 1cc)    Injection procedure:  Anatomic landmarks identified and anatomic landmarks palpated  Post-procedure details:     Patient tolerance of procedure:   Tolerated well, no immediate complications  Comments:      0 5cc in 6 divided trigger points in the bilateral cervical region

## 2023-02-17 NOTE — TELEPHONE ENCOUNTER
----- Message from Tashi Mackenzie MD sent at 2/16/2023 12:34 PM EST -----  I got a message from the infusion center that [facility] has pending orders for 5 rounds of migraine infusion  We need to call her and see how she is doing and does she want to come in for the infusions  If so, we can do them and get her appointment set up  If she is doing somewhat better and does not want to do the infusions coming up that is fine and we can cancel the orders  Please let me know  Thanks!

## 2023-02-17 NOTE — TELEPHONE ENCOUNTER
Patient said she was in for nerve block yesterday and discussed migraine infusion with Dr Nick Hampton; She prefers to hold for now  Please place therapy plan on hold  Thank you

## 2023-02-20 ENCOUNTER — PATIENT MESSAGE (OUTPATIENT)
Dept: NEUROLOGY | Facility: CLINIC | Age: 38
End: 2023-02-20

## 2023-02-20 NOTE — TELEPHONE ENCOUNTER
February 20, 2023  [facility] (supporting César Gonzalez MD)      3:27 PM  I tried calling to explain this as I think it would be easier to do so through an actual conversation, but I never have luck with getting though  I also seem to always miss the call when I get called back  But, anyway… we have established already that I get 2 types of headaches  One type could come on almost daily and shows up in the afternoon usually  It starts in the base of my neck and around my eyes  The other kind was happening every other week for a while and seemed to go hand in hand with me retaining fluid for whatever reason  As I started to feel better I would have a day where I would lose a good amount of fluid  Then the next day would pretty much be the day I was back to feeling better  These headaches have been happening pretty much 3 days on, 3 days off now since Christmas  I don’t know what is going on, but I am very dizzy during those headaches and have tons of difficulty functioning at all as staying awake is basically impossible    (Sending a 2nd message)…

## 2023-02-20 NOTE — TELEPHONE ENCOUNTER
See Squires  to [facility] (supporting Tristan Woo MD)      3:47 PM  Those headaches have been really destroying my life  I don’t even know if I can  how well the Nerve block and trigger point injections worked because the next morning I woke up with one of the major types of headaches  During this time my head basically hurts everywhere, like I mentioned I am very dizzy and extremely tired  Nothing really helps that I know of  I basically know I’m in for a few days of feeling like that and just have to wait it out  It has become a horrible process to go through so often now  It has been happening for 3 to 3 5 years  Before that I did get headaches, but more like the “daily” kind with the pain that starts in my neck, and they did go away within a reasonable time frame  I honestly have no clue what is going on with me  I thought at one point maybe it was related to [address] [facility] because of the fluid component  I know everything checked out okay with that, so that’s not it  I’m really hoping something can be figured out

## 2023-02-20 NOTE — TELEPHONE ENCOUNTER
Received VM transcription:    Hi, this is Papito Salazar  I saw Dr Ronnell Banuelos on Thursday for nerve blocks  He wanted me to check back in a few days  I would like to speak to somebody  It would be easier than leaving all the details in a message  If you could call me back [phone]  And my name once again is Papito Salazar  Thank you   --------------------------------------------------------    Called pt, no answer  Advised her to leave detailed message so that we can send to provider

## 2023-02-24 DIAGNOSIS — G43.709 CHRONIC MIGRAINE WITHOUT AURA WITHOUT STATUS MIGRAINOSUS, NOT INTRACTABLE: ICD-10-CM

## 2023-02-24 DIAGNOSIS — G43.719 INTRACTABLE CHRONIC MIGRAINE WITHOUT AURA AND WITHOUT STATUS MIGRAINOSUS: ICD-10-CM

## 2023-02-24 DIAGNOSIS — G43.909 MIGRAINE HEADACHE: Primary | ICD-10-CM

## 2023-02-24 RX ORDER — DEXAMETHASONE 4 MG/1
TABLET ORAL
Qty: 5 TABLET | Refills: 0 | Status: SHIPPED | OUTPATIENT
Start: 2023-02-24

## 2023-03-01 ENCOUNTER — HOSPITAL ENCOUNTER (EMERGENCY)
Facility: HOSPITAL | Age: 38
Discharge: HOME | End: 2023-03-01
Attending: EMERGENCY MEDICINE

## 2023-03-01 VITALS
HEART RATE: 70 BPM | TEMPERATURE: 96.9 F | SYSTOLIC BLOOD PRESSURE: 178 MMHG | DIASTOLIC BLOOD PRESSURE: 90 MMHG | OXYGEN SATURATION: 99 % | RESPIRATION RATE: 18 BRPM

## 2023-03-01 DIAGNOSIS — R22.0 LEFT FACIAL SWELLING: ICD-10-CM

## 2023-03-01 DIAGNOSIS — K04.7 DENTAL INFECTION: Primary | ICD-10-CM

## 2023-03-01 LAB
EXT PREGNANCY TEST URINE: NEGATIVE
EXT. CONTROL: NORMAL

## 2023-03-01 RX ORDER — CLINDAMYCIN HYDROCHLORIDE 300 MG/1
300 CAPSULE ORAL 3 TIMES DAILY
Qty: 30 CAPSULE | Refills: 0 | Status: SHIPPED | OUTPATIENT
Start: 2023-03-01 | End: 2023-03-11

## 2023-03-01 RX ORDER — CLINDAMYCIN HYDROCHLORIDE 150 MG/1
300 CAPSULE ORAL ONCE
Status: COMPLETED | OUTPATIENT
Start: 2023-03-01 | End: 2023-03-01

## 2023-03-01 RX ORDER — OXYCODONE HYDROCHLORIDE 5 MG/1
5 TABLET ORAL ONCE
Status: COMPLETED | OUTPATIENT
Start: 2023-03-01 | End: 2023-03-01

## 2023-03-01 RX ORDER — KETOROLAC TROMETHAMINE 30 MG/ML
30 INJECTION, SOLUTION INTRAMUSCULAR; INTRAVENOUS ONCE
Status: COMPLETED | OUTPATIENT
Start: 2023-03-01 | End: 2023-03-01

## 2023-03-01 RX ORDER — HYDROCODONE BITARTRATE AND ACETAMINOPHEN 5; 325 MG/1; MG/1
1 TABLET ORAL EVERY 6 HOURS PRN
Qty: 6 TABLET | Refills: 0 | Status: SHIPPED | OUTPATIENT
Start: 2023-03-01

## 2023-03-01 RX ADMIN — CLINDAMYCIN HYDROCHLORIDE 300 MG: 150 CAPSULE ORAL at 11:47

## 2023-03-01 RX ADMIN — OXYCODONE HYDROCHLORIDE 5 MG: 5 TABLET ORAL at 11:47

## 2023-03-01 RX ADMIN — KETOROLAC TROMETHAMINE 30 MG: 30 INJECTION, SOLUTION INTRAMUSCULAR; INTRAVENOUS at 11:48

## 2023-03-01 NOTE — DISCHARGE INSTRUCTIONS
Ice to reduce swelling  Start taking the alternative antibiotic  Continue your anti-inflammatory (celebrex)  Continue OTC tylenol  Follow up with your dentist or oral surgeon; return to ER as needed

## 2023-03-01 NOTE — ED PROVIDER NOTES
History  Chief Complaint   Patient presents with   • Facial Swelling     Pt states she woke up this morning with left sided facial swelling, pt was at dentist yesterday and was told she needs a root canal and was put on an abx  44year old female with PMH migraines, obesity, GERD presents ambulatory from home for evaluation of left sided facial swelling  Pt notes the other day her left front tooth was hurting  Saw her dentist yesterday (Dr Neida Spurling in [facility])  Was told she needed a root canal   She was started on amoxicillin  She states this morning she woke up and her face was swollen  Denies fever, chills  Denies cough, congestion or recent illness  She reports left sided sinus pressure  She notes a foul smell  No reported drainage  Denies chest pain, SOB, N/V/D, abdominal pain  Denies neck pain  No difficultly swallowing  No reported aggravating or alleviating factors  Has been taking tylenol without relief  History provided by:  Patient and medical records   used: No        Prior to Admission Medications   Prescriptions Last Dose Informant Patient Reported? Taking? Cholecalciferol (VITAMIN D3) 5000 units CAPS   Yes No   Sig: Take 5,000 Units by mouth daily   Emgality 120 MG/ML SOAJ   No No   Sig: INJECT 120 MG UNDER THE SKIN EVERY 30 DAYS  Insulin Pen Needle 32G X 6 MM MISC   No No   Sig: Use once daily as directed   LORazepam (ATIVAN) 0 5 mg tablet   Yes No   Linzess 290 MCG CAPS   No No   Sig: TAKE 1 CAPSULE BY MOUTH EVERY DAY   Multiple Vitamin (MULTIVITAMIN) capsule   Yes No   Sig: Take 1 capsule by mouth daily   Nurtec 75 MG TBDP   No No   Sig: TAKE 75 MG BY MOUTH EVERY OTHER DAY   IF THERE IS A MIGRAINE ON A NON-NURTEC DAY, CAN TAKE A DOSE AND SKIP THE FOLLOWING DAY   SUMAtriptan (IMITREX) 100 mg tablet   No No   Si tab as needed for migraine, can repeat once in 2 hours, max 200mg in 24 hours     Vraylar 3 MG capsule   Yes No   Sig: Take 3 mg by mouth daily Daily in the evening   Vraylar 6 MG capsule   Yes No   Sig: Take 6 mg by mouth daily Daily in the morning   Vyvanse 50 MG capsule   Yes No   Sig: Take 50 mg by mouth daily   Xiidra 5 % op solution   Yes No   celecoxib (CeleBREX) 200 mg capsule   No No   Sig: TAKE 1 CAPSULE BY MOUTH TWICE A DAY   clomiPRAMINE (ANAFRANIL) 50 mg capsule   Yes No   Sig: Take by mouth TAKE 2 CAPSULES IN AM AND 3 CAPSULES IN PM   dexamethasone (DECADRON) 4 mg tablet   No No   Si tab daily X 3 days, then 1/2 tab daily X 4 more days   folic acid (FOLVITE) 1 mg tablet   Yes No   furosemide (LASIX) 40 mg tablet   No No   Sig: TAKE 1/2 TABLET BY MOUTH DAILY OR 1 AND 1/2 TABS IF WEIGHT GAIN OF 3 POUNDS IN ONE DAY   gabapentin (NEURONTIN) 600 MG tablet   Yes No   Sig: TAKE 1/2 TABLET DAILY IN AM AND 1 AND 1/2 TABLET AT BEDTIME   hydrocortisone-acetic acid (VOSOL-HC) otic solution   No No   Sig: Administer 3 drops into both ears every 6 (six) hours   Patient not taking: Reported on 2023   levothyroxine 125 mcg tablet   No No   Sig: TAKE 1 TABLET BY MOUTH EVERY DAY IN THE MORNING   liraglutide (VICTOZA) injection   No No   Sig: Inject 1 8 mg subcutaneously once per day   loratadine (CLARITIN) 10 mg tablet   Yes No   Sig: Take 10 mg by mouth daily   loteprednol etabonate (LOTEMAX) 0 5 % ophthalmic suspension   Yes No   Sig: Administer 1 drop to both eyes 2 (two) times a day   metFORMIN (GLUCOPHAGE) 500 mg tablet   No No   Sig: TAKE 1 TABLET BY MOUTH EVERY DAY WITH BREAKFAST   mupirocin (BACTROBAN) 2 % ointment   No No   Sig: Apply topically 3 (three) times a day   omeprazole (PriLOSEC) 20 mg delayed release capsule   No No   Sig: Take 1 capsule (20 mg total) by mouth 2 (two) times a day   propranolol (INDERAL) 20 mg tablet   Yes No   Sig: Take 20 mg by mouth every 12 (twelve) hours   rOPINIRole (REQUIP) 4 mg tablet   No No   Sig: TAKE 1 TABLET BY MOUTH 2 TIMES A DAY     sertraline (ZOLOFT) 100 mg tablet   Yes No   Sig: Take 100 mg by mouth daily    spironolactone (ALDACTONE) 50 mg tablet   No No   Sig: TAKE 1 TABLET BY MOUTH EVERY DAY   topiramate (TOPAMAX) 100 mg tablet   No No   Sig: Take 100 mg twice a day      Facility-Administered Medications Last Administration Doses Remaining   Botulinum Toxin Type A SOLR 200 Units 4/1/2021  2:53 PM    Botulinum Toxin Type A SOLR 200 Units 7/28/2021 12:11 PM           Past Medical History:   Diagnosis Date   • Allergic    • Anorexia nervosa in remission    • Anxiety    • Arthritis 5/8/2014   • Back pain    • Bipolar disorder ([facility] [address] )    • Depression    • Disease of thyroid gland    • Diverticulitis of colon 9/20/2015   • Eating disorder    • Esophageal reflux 08/15/2013   • GERD (gastroesophageal reflux disease) 8/15/2013   • Headache(784 0)    • Hypertension    • Hypothyroid    • Idiopathic intracranial hypertension    • Lumbar degenerative disc disease 05/08/2014   • Migraine    • Obesity    • Obsessive-compulsive disorder    • Otitis media    • Overactive bladder    • Psychiatric disorder    • Restless leg syndrome, controlled    • Seasonal allergies    • Suicide and self-inflicted injury [facility])    • Transcranial Magnetic Stimulation 09/06/2021   • Urinary tract infection        Past Surgical History:   Procedure Laterality Date   • DENTAL SURGERY      wisdom teeth-at [age] years   Other surgery dental extraxtion of bone spur (10 years ago)   • IR LUMBAR PUNCTURE  02/26/2019   • SINUS ENDOSCOPY     • TONSILLECTOMY         Family History   Problem Relation Age of Onset   • Mental illness Mother    • Depression Mother    • Suicide Attempts Mother    • Hypertension Mother    • Diabetes Mother    • Other Mother         bicuspid aortic valve   • Anxiety disorder Mother    • Psychiatric Illness Mother    • Schizoaffective Disorder  Mother    • Hypertension Father    • Hypothyroidism Father    • Thyroid disease Father    • Hypertension Brother    • Cancer Maternal Grandmother    • Heart disease Maternal Grandmother    • Stroke Maternal Grandmother    • Breast cancer Maternal Grandmother    • Skin cancer Maternal Grandmother    • Arthritis Maternal Grandmother    • Pneumonia Maternal Grandfather    • Cancer Maternal Grandfather    • Dementia Maternal Grandfather    • COPD Maternal Grandfather    • Cancer Paternal Grandmother    • Pneumonia Paternal Grandfather    • Arthritis Family    • Breast cancer Family    • Osteopenia Family    • Osteoporosis Family    • Hypertension Family    • Transient ischemic attack Family      I have reviewed and agree with the history as documented  E-Cigarette/Vaping   • E-Cigarette Use Never User      E-Cigarette/Vaping Substances   • Nicotine No    • THC No    • CBD No    • Flavoring No    • Other No    • Unknown No      Social History     Tobacco Use   • Smoking status: Never   • Smokeless tobacco: Never   Vaping Use   • Vaping Use: Never used   Substance Use Topics   • Alcohol use: Never   • Drug use: Never       Review of Systems   Constitutional: Negative  Negative for chills, fatigue and fever  HENT: Positive for dental problem, facial swelling and sinus pressure  Negative for congestion, drooling, rhinorrhea and sore throat  Eyes: Negative  Negative for visual disturbance  Respiratory: Negative  Negative for cough, shortness of breath and wheezing  Cardiovascular: Negative  Negative for chest pain, palpitations and leg swelling  Gastrointestinal: Negative  Negative for abdominal pain, constipation, diarrhea, nausea and vomiting  Genitourinary: Negative  Negative for dysuria, flank pain, frequency and hematuria  Musculoskeletal: Negative  Negative for back pain, myalgias and neck pain  Skin: Negative  Negative for rash  Neurological: Positive for headaches  Negative for dizziness and light-headedness  Psychiatric/Behavioral: Negative  All other systems reviewed and are negative        Physical Exam  Physical Exam  Vitals and nursing note reviewed  Constitutional:       General: She is awake  She is not in acute distress  Appearance: She is well-developed  She is obese  She is not toxic-appearing  HENT:      Head: Normocephalic and atraumatic  Jaw: There is normal jaw occlusion  No trismus  Right Ear: Hearing, tympanic membrane, ear canal and external ear normal       Left Ear: Hearing, tympanic membrane, ear canal and external ear normal       Nose: Nose normal       Mouth/Throat:      Mouth: Mucous membranes are moist       Dentition: Gingival swelling present  No gum lesions  Tongue: Tongue does not deviate from midline  Pharynx: Oropharynx is clear  Uvula midline  Comments: There is mild swelling noted of upper lip and left cheek  No trismus  Tolerating oral secretions  No evidence of Scooby's angina  Eyes:      General: Lids are normal  No scleral icterus  Extraocular Movements: Extraocular movements intact  Conjunctiva/sclera: Conjunctivae normal       Pupils: Pupils are equal, round, and reactive to light  Neck:      Trachea: Trachea and phonation normal  No tracheal deviation  Cardiovascular:      Rate and Rhythm: Normal rate and regular rhythm  Pulses: Normal pulses  Heart sounds: Normal heart sounds, S1 normal and S2 normal  No murmur heard  Pulmonary:      Effort: Pulmonary effort is normal  No tachypnea or respiratory distress  Breath sounds: Normal breath sounds  No wheezing or rhonchi  Abdominal:      General: Bowel sounds are normal  There is no distension  Palpations: Abdomen is soft  Tenderness: There is no abdominal tenderness  There is no guarding  Musculoskeletal:      Cervical back: Normal range of motion and neck supple  Skin:     General: Skin is warm and dry  Capillary Refill: Capillary refill takes less than 2 seconds  Findings: No rash  Neurological:      General: No focal deficit present        Mental Status: She is alert and oriented to person, place, and time  GCS: GCS eye subscore is 4  GCS verbal subscore is 5  GCS motor subscore is 6  Cranial Nerves: No cranial nerve deficit  Sensory: No sensory deficit  Motor: No abnormal muscle tone  Psychiatric:         Mood and Affect: Mood normal          Speech: Speech normal          Behavior: Behavior normal          Vital Signs  ED Triage Vitals [03/01/23 1051]   Temperature Pulse Respirations Blood Pressure SpO2   (!) 96 9 °F (36 1 °C) 70 18 (!) 178/90 99 %      Temp Source Heart Rate Source Patient Position - Orthostatic VS BP Location FiO2 (%)   Temporal Monitor Lying Right arm --      Pain Score       5           Vitals:    03/01/23 1051   BP: (!) 178/90   Pulse: 70   Patient Position - Orthostatic VS: Lying         Visual Acuity      ED Medications  Medications   ketorolac (TORADOL) injection 30 mg (30 mg Intramuscular Given 3/1/23 1148)   oxyCODONE (ROXICODONE) IR tablet 5 mg (5 mg Oral Given 3/1/23 1147)   clindamycin (CLEOCIN) capsule 300 mg (300 mg Oral Given 3/1/23 1147)       Diagnostic Studies  Results Reviewed     Procedure Component Value Units Date/Time    POCT pregnancy, urine [[id number]]  (Normal) Resulted: 03/01/23 1148    Lab Status: Final result Updated: 03/01/23 1148     EXT Preg Test, Ur Negative     Control Valid                 No orders to display              Procedures  Procedures         ED Course        Pt afebrile, non toxic appearing  Pt tolerating PO  Suspect symptoms related to infected tooth with evolving infection, concern for facial cellulitis  Pt does not appear systemically ill  I have recommended initiation of broad spectrum antibiotic  We discussed obtaining IV and labs and CT scan of face to further explore her symptoms  Ultimately pt elected to start broad spectrum Abx and follow up outpatient with OMS which I do not feel is unreasonable  Discussed need to see dentist for definitive evaluation and management    Will cover for infection with Abx  Pt already on prescription NSAID  PDMP was queried and no suspicious behaviors were noted  Risks/benefits/side effects discussed  Strict return precautions outlined  I discussed with pt that if symptoms not improving over the next 2-3 days with therapy as outlined, should return to ER for re-evaluation and consideration of further testing  Advised outpatient follow up with dentist or return to ER for change in condition as outlined  Pt verbalized understanding and had no further questions  Medical Decision Making  Dental infection: acute illness or injury  Left facial swelling: acute illness or injury  Amount and/or Complexity of Data Reviewed  External Data Reviewed: labs  Labs: ordered  Decision-making details documented in ED Course  Risk  OTC drugs  Prescription drug management  Disposition  Final diagnoses:   Dental infection   Left facial swelling     Time reflects when diagnosis was documented in both MDM as applicable and the Disposition within this note     Time User Action Codes Description Comment    3/1/2023 11:42 AM Dina Núñez Add [K04 7] Dental infection     3/1/2023 11:42 AM Dina Núñez Add [R22 0] Left facial swelling       ED Disposition     ED Disposition   Discharge    Condition   Stable    Date/Time   Wed Mar 1, 2023 11:42 AM    Comment   Mikhail Wild discharge to home/self care                 Follow-up Information     Follow up With Specialties Details Why Contact Info Additional Information    Frank Jamie, DMD Oral Maxillofacial Surgery Schedule an appointment as soon as possible for a visit   [address] [place] [address] Emergency Department Emergency Medicine  As needed [phone]  [facility] Emergency Department, [address], [address] Discharge Medication List as of 3/1/2023 11:46 AM      START taking these medications    Details   clindamycin (CLEOCIN) 300 MG capsule Take 1 capsule (300 mg total) by mouth 3 (three) times a day for 10 days, Starting Wed 3/1/2023, Until Sat 3/11/2023, Normal      HYDROcodone-acetaminophen (Norco) 5-325 mg per tablet Take 1 tablet by mouth every 6 (six) hours as needed for pain Initial Rx Max Daily Amount: 4 tablets, Starting Wed 3/1/2023, Normal         CONTINUE these medications which have NOT CHANGED    Details   celecoxib (CeleBREX) 200 mg capsule TAKE 1 CAPSULE BY MOUTH TWICE A DAY, Normal      Cholecalciferol (VITAMIN D3) 5000 units CAPS Take 5,000 Units by mouth daily, Historical Med      clomiPRAMINE (ANAFRANIL) 50 mg capsule Take by mouth TAKE 2 CAPSULES IN AM AND 3 CAPSULES IN PM, Historical Med      dexamethasone (DECADRON) 4 mg tablet 1 tab daily X 3 days, then 1/2 tab daily X 4 more days, Normal      Emgality 120 MG/ML SOAJ INJECT 120 MG UNDER THE SKIN EVERY 30 DAYS , Normal      folic acid (FOLVITE) 1 mg tablet Starting Fri 1/27/2023, Historical Med      furosemide (LASIX) 40 mg tablet TAKE 1/2 TABLET BY MOUTH DAILY OR 1 AND 1/2 TABS IF WEIGHT GAIN OF 3 POUNDS IN ONE DAY, Normal      gabapentin (NEURONTIN) 600 MG tablet TAKE 1/2 TABLET DAILY IN AM AND 1 AND 1/2 TABLET AT BEDTIME, Historical Med      hydrocortisone-acetic acid (VOSOL-HC) otic solution Administer 3 drops into both ears every 6 (six) hours, Starting Sat 10/16/2021, Normal      Insulin Pen Needle 32G X 6 MM MISC Use once daily as directed, Normal      levothyroxine 125 mcg tablet TAKE 1 TABLET BY MOUTH EVERY DAY IN THE MORNING, Normal      Linzess 290 MCG CAPS TAKE 1 CAPSULE BY MOUTH EVERY DAY, Normal      liraglutide (VICTOZA) injection Inject 1 8 mg subcutaneously once per day, Normal      loratadine (CLARITIN) 10 mg tablet Take 10 mg by mouth daily, Historical Med      LORazepam (ATIVAN) 0 5 mg tablet Starting Wed 11/24/2021, Historical Med      loteprednol etabonate (LOTEMAX) 0 5 % ophthalmic suspension Administer 1 drop to both eyes 2 (two) times a day, Starting Wed 12/21/2022, Historical Med      metFORMIN (GLUCOPHAGE) 500 mg tablet TAKE 1 TABLET BY MOUTH EVERY DAY WITH BREAKFAST, Normal      Multiple Vitamin (MULTIVITAMIN) capsule Take 1 capsule by mouth daily, Historical Med      mupirocin (BACTROBAN) 2 % ointment Apply topically 3 (three) times a day, Starting Mon 1/30/2023, Normal      Nurtec 75 MG TBDP TAKE 75 MG BY MOUTH EVERY OTHER DAY   IF THERE IS A MIGRAINE ON A NON-NURTEC DAY, CAN TAKE A DOSE AND SKIP THE FOLLOWING DAY, Starting Mon 12/19/2022, Normal      omeprazole (PriLOSEC) 20 mg delayed release capsule Take 1 capsule (20 mg total) by mouth 2 (two) times a day, Starting Mon 11/28/2022, Normal      propranolol (INDERAL) 20 mg tablet Take 20 mg by mouth every 12 (twelve) hours, Starting Fri 1/27/2023, Historical Med      rOPINIRole (REQUIP) 4 mg tablet TAKE 1 TABLET BY MOUTH 2 TIMES A DAY , Normal      sertraline (ZOLOFT) 100 mg tablet Take 100 mg by mouth daily , Starting Fri 7/20/2018, Historical Med      spironolactone (ALDACTONE) 50 mg tablet TAKE 1 TABLET BY MOUTH EVERY DAY, Normal      SUMAtriptan (IMITREX) 100 mg tablet 1 tab as needed for migraine, can repeat once in 2 hours, max 200mg in 24 hours  , Normal      topiramate (TOPAMAX) 100 mg tablet Take 100 mg twice a day, Normal      !! Vraylar 3 MG capsule Take 3 mg by mouth daily Daily in the evening, Starting Mon 1/16/2023, Historical Med      !! Vraylar 6 MG capsule Take 6 mg by mouth daily Daily in the morning, Starting Mon 6/1/2020, Historical Med      Vyvanse 50 MG capsule Take 50 mg by mouth daily, Starting Wed 9/8/2021, Historical Med      Xiidra 5 % op solution Starting Fri 12/9/2022, Historical Med       !! - Potential duplicate medications found  Please discuss with provider  No discharge procedures on file      PDMP Review       Value Time User PDMP Reviewed  Yes 3/1/2023 11:33 AM Reed Duran PA-C          ED Provider  Electronically Signed by           Reed Duran PA-C  03/01/23 0702

## 2023-03-02 RX ORDER — RIMEGEPANT SULFATE 75 MG/75MG
75 TABLET, ORALLY DISINTEGRATING ORAL EVERY OTHER DAY
Qty: 16 TABLET | Refills: 0 | Status: SHIPPED | OUTPATIENT
Start: 2023-03-02

## 2023-03-02 RX ORDER — TOPIRAMATE 100 MG/1
150 TABLET, FILM COATED ORAL 2 TIMES DAILY
Qty: 270 TABLET | Refills: 1 | Status: SHIPPED | OUTPATIENT
Start: 2023-03-02

## 2023-03-09 ENCOUNTER — OFFICE VISIT (OUTPATIENT)
Dept: OTOLARYNGOLOGY | Facility: CLINIC | Age: 38
End: 2023-03-09

## 2023-03-09 VITALS
SYSTOLIC BLOOD PRESSURE: 118 MMHG | HEART RATE: 72 BPM | WEIGHT: 293 LBS | OXYGEN SATURATION: 99 % | TEMPERATURE: 97.5 F | DIASTOLIC BLOOD PRESSURE: 70 MMHG | HEIGHT: 67 IN | BODY MASS INDEX: 45.99 KG/M2

## 2023-03-09 DIAGNOSIS — J32.2 CHRONIC ETHMOIDAL SINUSITIS: ICD-10-CM

## 2023-03-09 DIAGNOSIS — J32.0 CHRONIC MAXILLARY SINUSITIS: ICD-10-CM

## 2023-03-09 DIAGNOSIS — Z98.890 S/P FESS (FUNCTIONAL ENDOSCOPIC SINUS SURGERY): ICD-10-CM

## 2023-03-09 DIAGNOSIS — J34.89 FOUL SMELLING DISCHARGE FROM NOSE: Primary | ICD-10-CM

## 2023-03-09 DIAGNOSIS — K04.7 DENTAL INFECTION: ICD-10-CM

## 2023-03-09 DIAGNOSIS — Z87.09 HISTORY OF NASAL POLYP: ICD-10-CM

## 2023-03-09 RX ORDER — PAROXETINE 30 MG/1
30 TABLET, FILM COATED ORAL DAILY
COMMUNITY
Start: 2023-02-22

## 2023-03-09 RX ORDER — CYCLOSPORINE 0 G/ML
SOLUTION/ DROPS OPHTHALMIC; TOPICAL
COMMUNITY
Start: 2023-03-02

## 2023-03-09 NOTE — PROGRESS NOTES
Assessment/Plan:    Chronic ethmoidal sinusitis  43 YO female with history of CRS w NP, s/p FESS with nasal polypectomy 20+ years ago with good control of symptoms until 0513-0289 when she developed recurrence in facial pressure and headaches  She was a candidate for an in-office procedure (likely balloon sinuplasty) but this wasn't pursued  More recently, she has experienced persistent sinus symptoms after developing COVID in October  More recently, within the past 2 weeks, she developed malodorous nasal drainage after a dental infection and is afraid that it may be affecting her sinuses  Her nasal endoscopy today demonstrates opaque mucus within the entrance of the nasopharynx  Maxillary antrostomies are patent  She has had an MRI brain and CT head in the past which demonstrated mucosal thickening within the maxillary and ethmoid sinuses  Stable left ethmoid opacification  Has been using Navage for years, which I encouraged she continue  She was using Flonase for at least 1 year without improvement in symptoms  She also does not like the taste  Recommend fluticasone Veramyst for 6 weeks  If she continues to be symptomatic after 6 weeks of therapy, recommend CT sinus for further evaluation  Follow up after imaging to review results         Diagnoses and all orders for this visit:    Foul smelling discharge from nose    Dental infection    Chronic ethmoidal sinusitis  -     Ambulatory Referral to Otolaryngology  -     Nasal / sinus endoscopy  -     fluticasone (VERAMYST) 27 5 MCG/SPRAY nasal spray; 2 sprays into each nostril daily  -     CT sinus wo contrast; Future    Chronic maxillary sinusitis  -     fluticasone (VERAMYST) 27 5 MCG/SPRAY nasal spray; 2 sprays into each nostril daily  -     CT sinus wo contrast; Future    History of nasal polyp  -     fluticasone (VERAMYST) 27 5 MCG/SPRAY nasal spray; 2 sprays into each nostril daily  -     CT sinus wo contrast; Future    S/P FESS (functional endoscopic sinus surgery)  -     Nasal / sinus endoscopy  -     fluticasone (VERAMYST) 27 5 MCG/SPRAY nasal spray; 2 sprays into each nostril daily  -     CT sinus wo contrast; Future    Other orders  -     Cequa 0 09 % SOLN; instill 1 drop into both eyes twice a day  -     PARoxetine (PAXIL) 30 mg tablet; Take 30 mg by mouth daily  -     lisdexamfetamine (VYVANSE) 50 MG capsule; Take 50 mg by mouth every morning          Subjective:      Patient ID: Misa Monreal is a 44 y o  female  New patient presenting to our office with her partner for evaluation of chronic sinusitis with nasal polyposis  At the age of 11, she had endoscopic sinus surgery with nasal polypectomy  She did well postoperatively until 2018/2019 when she started developing more frequent headaches and sinus pressure  Imaging was performed at that time, identifying opacified ethmoid sinuses  An in-office procedure was recommended, but not pursued because she moved to a different area  She had a "bad sinus infection" after testing positive for COVID in 10/2022  Since then, she has been experiencing intermittent malodorous nasal drainage, copious PND, intermittent nasal congestion, dry nasal mucosa  She also has been having constant headaches, experiencing pain around the eyes and back of the head  Neurology managing migraine and benign intracranial hypertension  She uses the navage a few times per week for years  Was using Flonase from 2018 to 2019, but is not fond of the taste  2 weeks ago, she was evaluated at the dentist after developing dental and facial pain, facial swelling, foul-smelling drainage  She was placed on a 10-day course of clindamycin and was provided a refill just in case symptoms persisted  Extraction was recommended  She states that the antibiotic helped with the drainage, but will intermittently  on a foul smell in her nose         The following portions of the patient's history were reviewed and updated as appropriate:   She  has a past medical history of Allergic, Allergic rhinitis, Anorexia nervosa in remission, Anxiety, Arthritis (5/8/2014), Back pain, Bipolar disorder ([facility] ), Depression, Disease of thyroid gland, Diverticulitis of colon (9/20/2015), Dizziness, Ear problems, Eating disorder, Esophageal reflux (08/15/2013), GERD (gastroesophageal reflux disease) (8/15/2013), Headache(784 0), Hypertension, Hypothyroid, Idiopathic intracranial hypertension, Lumbar degenerative disc disease (05/08/2014), Migraine, Nasal congestion, Nosebleed, Obesity, Obsessive-compulsive disorder, Otitis media, Overactive bladder, Psychiatric disorder, Restless leg syndrome, controlled, Seasonal allergies, Sinusitis, Sleep difficulties, Suicide and self-inflicted injury ([facility] ), Tinnitus, TMJ dysfunction, Tonsillitis, Transcranial Magnetic Stimulation (09/06/2021), and Urinary tract infection    She   Patient Active Problem List    Diagnosis Date Noted   • Chronic ethmoidal sinusitis 03/14/2023   • Chronic maxillary sinusitis 03/14/2023   • Persistent proteinuria 01/05/2023   • Hyperaldosteronism ([facility] ) 09/16/2022   • Migraine headache 08/22/2022   • Encounter for medication monitoring 07/06/2022   • Hypervolemia 07/06/2022   • Thyroid disease neuropathy ([facility] ) 03/19/2022   • Dermatitis 10/16/2021   • Chronic constipation 10/16/2021   • Numbness and tingling in both hands 09/24/2021   • Cervicalgia 01/06/2021   • IUD check up 03/06/2020   • Class 3 severe obesity with body mass index (BMI) of 60 0 to 69 9 in adult [facility]) 03/06/2020   • CAIO (obstructive sleep apnea)    • Encounter for insertion of mirena IUD 02/10/2020   • Menorrhagia with irregular cycle 12/30/2019   • Chronic migraine without aura without status migrainosus, not intractable 03/04/2019   • Chronic tension-type headache, not intractable 03/04/2019   • Intracranial hypertension 02/28/2019   • Family history of cancer 01/22/2019   • Recurrent sinusitis 11/29/2018   • IFG (impaired fasting glucose) 11/29/2018   • Skin lesion 09/06/2018   • Iliotibial band syndrome of left side 09/04/2018   • Piriformis syndrome of left side 09/04/2018   • Chronic midline low back pain without sciatica 07/17/2018   • Dysfunction of both eustachian tubes 05/20/2018   • Routine screening for STI (sexually transmitted infection) 02/26/2018   • BMI 50 0-59 9, adult ([facility] ) 02/26/2018   • Obsessive-compulsive disorder 01/24/2018   • Hypertension 01/24/2018   • Hypothyroidism 01/24/2018   • Overactive bladder 01/24/2018   • Allergic rhinitis 01/24/2018   • PTSD (post-traumatic stress disorder) 10/12/2017   • Urgency incontinence 07/06/2017   • Nocturnal enuresis 05/16/2017   • Binge-eating disorder, severe 09/09/2016   • EILEEN (generalized anxiety disorder) 11/18/2015   • Gambling disorder, moderate 06/15/2015   • Morbid obesity ([facility] ) 09/25/2014   • Bulging lumbar disc 05/08/2014   • Lumbar degenerative disc disease 05/08/2014   • Vitamin D deficiency 01/15/2014   • Esophageal reflux 08/15/2013     She  has a past surgical history that includes Tonsillectomy; Sinus endoscopy; Dental surgery; IR lumbar puncture (02/26/2019); and Sinus surgery  She  reports that she has never smoked  She has never used smokeless tobacco  She reports that she does not drink alcohol and does not use drugs  Current Outpatient Medications   Medication Sig Dispense Refill   • celecoxib (CeleBREX) 200 mg capsule TAKE 1 CAPSULE BY MOUTH TWICE A DAY 60 capsule 4   • Cequa 0 09 % SOLN instill 1 drop into both eyes twice a day     • Cholecalciferol (VITAMIN D3) 5000 units CAPS Take 5,000 Units by mouth daily     • clomiPRAMINE (ANAFRANIL) 50 mg capsule Take by mouth TAKE 2 CAPSULES IN AM AND 3 CAPSULES IN PM     • dexamethasone (DECADRON) 4 mg tablet 1 tab daily X 3 days, then 1/2 tab daily X 4 more days 5 tablet 0   • Emgality 120 MG/ML SOAJ INJECT 120 MG UNDER THE SKIN EVERY 30 DAYS   3 mL 1   • fluticasone (VERAMYST) 27 5 MCG/SPRAY nasal spray 2 sprays into each nostril daily 9 1 mL 5   • folic acid (FOLVITE) 1 mg tablet      • furosemide (LASIX) 40 mg tablet TAKE 1/2 TABLET BY MOUTH DAILY OR 1 AND 1/2 TABS IF WEIGHT GAIN OF 3 POUNDS IN ONE  tablet 2   • gabapentin (NEURONTIN) 600 MG tablet TAKE 1/2 TABLET DAILY IN AM AND 1 AND 1/2 TABLET AT BEDTIME     • HYDROcodone-acetaminophen (Norco) 5-325 mg per tablet Take 1 tablet by mouth every 6 (six) hours as needed for pain Initial Rx Max Daily Amount: 4 tablets 6 tablet 0   • Insulin Pen Needle 32G X 6 MM MISC Use once daily as directed 90 each 0   • levothyroxine 125 mcg tablet TAKE 1 TABLET BY MOUTH EVERY DAY IN THE MORNING 90 tablet 1   • Linzess 290 MCG CAPS TAKE 1 CAPSULE BY MOUTH EVERY DAY 30 capsule 5   • liraglutide (VICTOZA) injection Inject 1 8 mg subcutaneously once per day 9 mL 1   • lisdexamfetamine (VYVANSE) 50 MG capsule Take 50 mg by mouth every morning     • loratadine (CLARITIN) 10 mg tablet Take 10 mg by mouth daily     • LORazepam (ATIVAN) 0 5 mg tablet      • loteprednol etabonate (LOTEMAX) 0 5 % ophthalmic suspension Administer 1 drop to both eyes 2 (two) times a day     • metFORMIN (GLUCOPHAGE) 500 mg tablet TAKE 1 TABLET BY MOUTH EVERY DAY WITH BREAKFAST 90 tablet 0   • Multiple Vitamin (MULTIVITAMIN) capsule Take 1 capsule by mouth daily     • mupirocin (BACTROBAN) 2 % ointment Apply topically 3 (three) times a day 30 g 0   • omeprazole (PriLOSEC) 20 mg delayed release capsule Take 1 capsule (20 mg total) by mouth 2 (two) times a day 180 capsule 1   • PARoxetine (PAXIL) 30 mg tablet Take 30 mg by mouth daily     • propranolol (INDERAL) 20 mg tablet Take 20 mg by mouth every 12 (twelve) hours     • Rimegepant Sulfate (Nurtec) 75 MG TBDP Take 75 mg by mouth every other day   If there is a migraine on a non-Nurtec day, can take a dose and skip the following day 16 tablet 0   • rOPINIRole (REQUIP) 4 mg tablet TAKE 1 TABLET BY MOUTH 2 TIMES A DAY   180 tablet 1   • spironolactone (ALDACTONE) 50 mg tablet TAKE 1 TABLET BY MOUTH EVERY DAY 90 tablet 1   • SUMAtriptan (IMITREX) 100 mg tablet 1 tab as needed for migraine, can repeat once in 2 hours, max 200mg in 24 hours  12 tablet 3   • topiramate (TOPAMAX) 100 mg tablet Take 1 5 tablets (150 mg total) by mouth 2 (two) times a day 270 tablet 1   • Vraylar 3 MG capsule Take 3 mg by mouth daily Daily in the evening     • Vraylar 6 MG capsule Take 6 mg by mouth daily Daily in the morning     • hydrocortisone-acetic acid (VOSOL-HC) otic solution Administer 3 drops into both ears every 6 (six) hours (Patient not taking: Reported on 1/5/2023) 10 mL 0   • sertraline (ZOLOFT) 100 mg tablet Take 100 mg by mouth daily  (Patient not taking: Reported on 3/9/2023)  0   • Xiidra 5 % op solution  (Patient not taking: Reported on 3/9/2023)       Current Facility-Administered Medications   Medication Dose Route Frequency Provider Last Rate Last Admin   • Botulinum Toxin Type A SOLR 200 Units  200 Units Injection Q3 Months Arlet Doyle PA-C   200 Units at 04/01/21 1453   • Botulinum Toxin Type A SOLR 200 Units  200 Units Injection Q3 Months Devi Alvares MD   200 Units at 07/28/21 1211     She is allergic to doxycycline and other       Review of Systems      Objective:      /70 (BP Location: Left arm, Patient Position: Sitting, Cuff Size: Adult)   Pulse 72   Temp 97 5 °F (36 4 °C) (Temporal)   Ht 5' 7" (1 702 m)   Wt (!) 183 kg (402 lb 12 8 oz)   SpO2 99%   BMI 63 09 kg/m²          Physical Exam  Vitals reviewed  Constitutional:       General: She is awake  Appearance: Normal appearance  She is well-developed  HENT:      Head: Normocephalic and atraumatic  Right Ear: Hearing, tympanic membrane, ear canal and external ear normal       Left Ear: Hearing, tympanic membrane, ear canal and external ear normal       Nose: Nose normal       Mouth/Throat:      Lips: Pink        Mouth: Mucous membranes are moist  Pharynx: Oropharynx is clear  Uvula midline  Eyes:      General: Lids are normal       Conjunctiva/sclera: Conjunctivae normal    Neck:      Thyroid: No thyroid mass, thyromegaly or thyroid tenderness  Pulmonary:      Effort: Pulmonary effort is normal    Musculoskeletal:      Cervical back: Normal range of motion and neck supple  Lymphadenopathy:      Cervical: No cervical adenopathy  Skin:     General: Skin is warm and dry  Neurological:      Mental Status: She is alert and oriented to person, place, and time  Psychiatric:         Mood and Affect: Mood normal          Behavior: Behavior normal  Behavior is cooperative  Nasal / sinus endoscopy     Date/Time 3/9/2023 1:49 PM     Performed by  Claudine Garzon PA-C     Authorized by Claudine Garzon PA-C      Universal Protocol   Consent: Verbal consent obtained  Consent given by: patient  Timeout called at: 3/9/2023 1:49 PM   Patient understanding: patient states understanding of the procedure being performed  Patient consent: the patient's understanding of the procedure matches consent given  Procedure consent: procedure consent matches procedure scheduled  Patient identity confirmed: verbally with patient        Local anesthesia used: yes     Anesthesia   Local anesthesia used: yes  Local Anesthetic: topical anesthetic     Sedation   Patient sedated: no        Specimen: no    Culture: no   Procedure Details   Procedure Notes: Procedure: Nasal endoscopy  Indications: Evaluate areas of the upper aerodigestive tract not seen on physical exam     Procedure in detail: After informed verbal consent was obtained the nose was anesthetized using 4% lidocaine and neosynephrine spray  After adequate time for anesthesia the scope was guided easily into the nasal cavities bilaterally  The scope was removed without difficulty and the patient tolerated the procedure well  The findings are listed below      Findings: maxillary antrostomy patent bilaterally  No pooling of secretions  Middle meatus clear bilaterally  Accumulation of opaque mucous at the entrance of the nasopharynx  No polyps  Patient tolerance: Patient tolerated the procedure well with no immediate complications         Imaging:    MRI brain 01/2/6/23: "PARANASAL SINUSES:  Status post FESS  Right maxillary and ethmoid mucosal thickening  Stable opacification of residual left ethmoid air cell "    CT head 01/04/21: Left ethmoid opacification  mucous retention cyst in the right maxillary

## 2023-03-09 NOTE — PROGRESS NOTES
Review of Systems   Constitutional: Negative  HENT: Positive for congestion, postnasal drip, rhinorrhea, sinus pressure and sinus pain  Eyes: Negative  Respiratory: Negative  Cardiovascular: Negative  Gastrointestinal: Negative  Endocrine: Negative  Genitourinary: Negative  Musculoskeletal: Negative  Skin: Negative  Allergic/Immunologic: Negative  Neurological: Positive for headaches  Hematological: Negative  Psychiatric/Behavioral: Negative

## 2023-03-14 PROBLEM — J32.0 CHRONIC MAXILLARY SINUSITIS: Status: ACTIVE | Noted: 2023-03-14

## 2023-03-14 PROBLEM — J32.2 CHRONIC ETHMOIDAL SINUSITIS: Status: ACTIVE | Noted: 2023-03-14

## 2023-03-14 NOTE — ASSESSMENT & PLAN NOTE
43 YO female with history of CRS w NP, s/p FESS with nasal polypectomy 20+ years ago with good control of symptoms until 3823-6291 when she developed recurrence in facial pressure and headaches  She was a candidate for an in-office procedure (likely balloon sinuplasty) but this wasn't pursued  More recently, she has experienced persistent sinus symptoms after developing COVID in October  More recently, within the past 2 weeks, she developed malodorous nasal drainage after a dental infection and is afraid that it may be affecting her sinuses  Her nasal endoscopy today demonstrates opaque mucus within the entrance of the nasopharynx  Maxillary antrostomies are patent  She has had an MRI brain and CT head in the past which demonstrated mucosal thickening within the maxillary and ethmoid sinuses  Stable left ethmoid opacification  Has been using Navage for years, which I encouraged she continue  She was using Flonase for at least 1 year without improvement in symptoms  She also does not like the taste  Recommend fluticasone Veramyst for 6 weeks  If she continues to be symptomatic after 6 weeks of therapy, recommend CT sinus for further evaluation  Follow up after imaging to review results

## 2023-03-16 ENCOUNTER — OFFICE VISIT (OUTPATIENT)
Dept: BARIATRICS | Facility: CLINIC | Age: 38
End: 2023-03-16

## 2023-03-16 VITALS
SYSTOLIC BLOOD PRESSURE: 126 MMHG | TEMPERATURE: 98 F | HEIGHT: 67 IN | WEIGHT: 293 LBS | DIASTOLIC BLOOD PRESSURE: 72 MMHG | HEART RATE: 76 BPM | BODY MASS INDEX: 45.99 KG/M2 | OXYGEN SATURATION: 98 % | RESPIRATION RATE: 18 BRPM

## 2023-03-16 DIAGNOSIS — F50.81 BINGE-EATING DISORDER, SEVERE: ICD-10-CM

## 2023-03-16 DIAGNOSIS — E66.01 MORBID (SEVERE) OBESITY DUE TO EXCESS CALORIES (HCC): ICD-10-CM

## 2023-03-16 DIAGNOSIS — R73.01 IFG (IMPAIRED FASTING GLUCOSE): ICD-10-CM

## 2023-03-16 DIAGNOSIS — R63.5 ABNORMAL WEIGHT GAIN: ICD-10-CM

## 2023-03-16 DIAGNOSIS — E66.01 MORBID OBESITY (HCC): Primary | ICD-10-CM

## 2023-03-16 NOTE — PATIENT INSTRUCTIONS
Goals: Food log (ie ) www myfitnesspal com,sparkpeople  com,loseit com,calorieking  com,etc    No sugary beverages  At least 64oz of water daily  Increase physical activity by 10 minutes daily  Gradually increase physical activity to a goal of 5 days per week for 30 minutes of MODERATE intensity PLUS 2 days per week of FULL BODY resistance training  Avoid white bread/bagels, switch to whole grain  Increase water to 64oz  Plan meals to avoid fast food     Visit wegovy  com for further information/injection instructions  Please eat small frequent meals to help reduce nausea  Lemon water and saltine crackers may help with this  If you experience fever, nausea/vomiting, and pain radiating to your back this may be a sign of pancreatitis  Please have ER evaluation with this occurs

## 2023-03-16 NOTE — ASSESSMENT & PLAN NOTE
-Patient is pursuing Conservative Program  -Initial weight loss goal of 5-10% weight loss for improved health  -Screening labs: reviewed, up to date  -dietary/lifestyle changes, continue to work with [address]  -On Topamax and Wellbutrin already   -avoid Phentermine  -Ozempic 1mg switch to Victoza due to shortage but not controlling appetite as well  Discussed switching to [facility]  Patient is in agreement  0 25mg dose sent to Pharmacy  -dietary recall reviewed  Patient struggling emotionally and has not  been focusing as much on her nutrition  Has cut back on fast food   She will work on planning meals and preparing food to take with her to avoid fast food    -She will also work on increasing water to goal    Initial: 421 lbs  Last OV: 390 lbs (patient reported)  Current: 403 8 lbs  Change: -17 2 lbs, +13 8 lbs since last visit   Goal: wants to feel healthy

## 2023-03-16 NOTE — PROGRESS NOTES
Assessment/Plan: Morbid obesity ([address] )  -Patient is pursuing Conservative Program  -Initial weight loss goal of 5-10% weight loss for improved health  -Screening labs: reviewed, up to date  -dietary/lifestyle changes, continue to work with [place]  -On Topamax and Wellbutrin already   -avoid Phentermine  -Ozempic 1mg switch to Victoza due to shortage but not controlling appetite as well  Discussed switching to [place]  Patient is in agreement  0 25mg dose sent to Pharmacy  -dietary recall reviewed  Patient struggling emotionally and has not  been focusing as much on her nutrition  Has cut back on fast food  She will work on planning meals and preparing food to take with her to avoid fast food    -She will also work on increasing water to goal    Initial: 421 lbs  Last OV: 390 lbs (patient reported)  Current: 403 8 lbs  Change: -17 2 lbs, +13 8 lbs since last visit   Goal: wants to feel healthy    Binge-eating disorder, severe  -followed by psychiatry  -hx Binge eating - on Vyvanse    IFG (impaired fasting glucose)  -On Metformin 500mg daily  -On Victoza 1 8mg, no longer on Ozempic due to shortage  -HgbA1c improved to 5 6  -Tolerating Well  - Denies personal hx of pancreatitis or family hx of MEN2/MTC    Goals:    Food log (ie ) www myfitnesspal com,sparkpeople  com,loseit com,calorieking  com,etc    No sugary beverages  At least 64oz of water daily  Increase physical activity by 10 minutes daily  Gradually increase physical activity to a goal of 5 days per week for 30 minutes of MODERATE intensity PLUS 2 days per week of FULL BODY resistance training  Avoid white bread/bagels, switch to whole grain  Increase water to 64oz  Plan meals to avoid fast food     Visit wegovy  com for further information/injection instructions  Please eat small frequent meals to help reduce nausea  Lemon water and saltine crackers may help with this   If you experience fever, nausea/vomiting, and pain radiating to your back this may be a sign of pancreatitis  Please have ER evaluation with this occurs  Follow up in approximately 2 months with Non-Surgical Physician/Advanced Practitioner  Diagnoses and all orders for this visit:    Morbid obesity ([address] )  -     Semaglutide-Weight Management (WEGOVY) 0 25 MG/0 5ML; Inject 0 5 mL (0 25 mg total) under the skin once a week for 4 doses    Binge-eating disorder, severe  -     metFORMIN (GLUCOPHAGE) 500 mg tablet; Take 1 tablet (500 mg total) by mouth daily with breakfast    IFG (impaired fasting glucose)  -     metFORMIN (GLUCOPHAGE) 500 mg tablet; Take 1 tablet (500 mg total) by mouth daily with breakfast    Morbid (severe) obesity due to excess calories (HCC)  -     metFORMIN (GLUCOPHAGE) 500 mg tablet; Take 1 tablet (500 mg total) by mouth daily with breakfast    Abnormal weight gain  -     metFORMIN (GLUCOPHAGE) 500 mg tablet; Take 1 tablet (500 mg total) by mouth daily with breakfast          Subjective:   No chief complaint on file  Patient ID: Keya Haynes  is a 44 y o  female with excess weight/obesity here to pursue weight managment  Patient is pursuing Conservative Program      HPI Patient presents for MWM follow up  Reports the [employer] is not controlling her appetite as well as the Ozempic was   Reports winter is always harder for her with her depression  She continues to go through therapy  Her depression medication was recently changed from Zoloft to Paxil to better control her OCD   Thinks she has been eating less fast food    Cravings: + sweets  Hydration: 1 bottles, diet decaf iced tea, coffee + flavored creamer  Exercise: denies  Sleeps: wearing CPAP, 6-7 hours    B: sesame bagel + PB  S: skips  L: left overs or sandwich, varies  S: string cheese  D: frozen pizza  S: cereal   S: sometimes eats sweets during the middle of night when she wakes up to give her dog seizure medication    Wt Readings from Last 10 Encounters:   03/16/23 (!) 183 kg (403 lb 12 8 oz)   03/09/23 (!) 183 kg (402 lb 12 8 oz)   01/30/23 (!) 180 kg (396 lb)   01/27/23 (!) 180 kg (397 lb)   01/26/23 (!) 180 kg (397 lb)   01/05/23 (!) 180 kg (396 lb 12 8 oz)   12/29/22 (!) 177 kg (390 lb)   12/22/22 (!) 177 kg (390 lb)   12/21/22 (!) 180 kg (396 lb 12 8 oz)   12/09/22 (!) 177 kg (390 lb 12 8 oz)        The following portions of the patient's history were reviewed and updated as appropriate: allergies, current medications, past family history, past medical history, past social history, past surgical history and problem list     Review of Systems   Respiratory: Negative  Cardiovascular: Negative  Objective:    /72 (BP Location: Left arm, Patient Position: Sitting, Cuff Size: Large)   Pulse 76   Temp 98 °F (36 7 °C) (Temporal)   Resp 18   Ht 5' 7" (1 702 m)   Wt (!) 183 kg (403 lb 12 8 oz)   SpO2 98%   BMI 63 24 kg/m²      Physical Exam  Vitals and nursing note reviewed  Constitutional:       Appearance: She is obese  HENT:      Head: Normocephalic and atraumatic  Mouth/Throat:      Mouth: Mucous membranes are moist    Eyes:      General: No scleral icterus  Pulmonary:      Effort: Pulmonary effort is normal  No respiratory distress  Abdominal:      Comments: Obese, protuberant   Skin:     General: Skin is warm  Neurological:      General: No focal deficit present  Mental Status: She is alert  Psychiatric:         Mood and Affect: Mood normal          Thought Content:  Thought content normal          Judgment: Judgment normal

## 2023-03-17 NOTE — TELEPHONE ENCOUNTER
I know I don't currently have an outpt schedule but I could see her in the [place] [id number] office Tuesday at 1130  [Initial Visit ___] : [unfilled] is here today for an initial visit  for [unfilled]

## 2023-03-19 DIAGNOSIS — G25.81 RESTLESS LEG SYNDROME: ICD-10-CM

## 2023-03-19 RX ORDER — ROPINIROLE 4 MG/1
TABLET, FILM COATED ORAL
Qty: 180 TABLET | Refills: 1 | Status: SHIPPED | OUTPATIENT
Start: 2023-03-19 | End: 2023-03-19 | Stop reason: CLARIF

## 2023-03-22 ENCOUNTER — OFFICE VISIT (OUTPATIENT)
Dept: FAMILY MEDICINE CLINIC | Facility: CLINIC | Age: 38
End: 2023-03-22

## 2023-03-22 VITALS
DIASTOLIC BLOOD PRESSURE: 70 MMHG | SYSTOLIC BLOOD PRESSURE: 124 MMHG | OXYGEN SATURATION: 99 % | HEART RATE: 75 BPM | BODY MASS INDEX: 45.99 KG/M2 | TEMPERATURE: 97.3 F | WEIGHT: 293 LBS | HEIGHT: 67 IN

## 2023-03-22 DIAGNOSIS — Z00.00 MEDICARE ANNUAL WELLNESS VISIT, SUBSEQUENT: Primary | ICD-10-CM

## 2023-03-22 NOTE — PROGRESS NOTES
Assessment and Plan:     Problem List Items Addressed This Visit    None  Visit Diagnoses     Medicare annual wellness visit, subsequent    -  Primary    Patient states she is doing well overall  Seeing weight management for Obesity  Seeing Psych regularly for depression  Last pap- 5/11/22 normal  F/u 6 months          Depression Screening and Follow-up Plan: Patient's depression screening was positive with a PHQ-2 score of 3  Their PHQ-9 score was 17  Continue regular follow-up with their mental health provider who is managing their mental health condition(s)  Patient sees Psychiatrist once a month  Preventive health issues were discussed with patient, and age appropriate screening tests were ordered as noted in patient's After Visit Summary  Personalized health advice and appropriate referrals for health education or preventive services given if needed, as noted in patient's After Visit Summary  History of Present Illness:     Patient presents for a Medicare Wellness Visit    Presents to the clinic for medicare wellness  Patient states that she is doing well overall  States that she has no concerns this visit  She sees weight management at [facility] for obesity management  States that she sees psychiatrist once a month for depression  Denies fever, chills, chest pain, sob, abdominal pain, n/v, loc  Reports regular periods  Last pap- 5/11/22  Patient Care Team:  Edmond Zamorano MD as PCP - General (Family Medicine)  Ken Rodriguez DO as PCP - [address] [place], [place] [id number] [facility] ([facility])  Edmond Zamorano MD as PCP - PCP-[facility] ([facility])  YANG Tyler DO Caprice Camps, MD Annie Battle, MD Crecencio Main, PA-C (Physician Assistant)     Review of Systems:     Review of Systems   Constitutional: Negative for chills and fever  HENT: Negative for ear pain and sore throat  Eyes: Negative for pain and visual disturbance     Respiratory: Negative for cough and shortness of breath  Cardiovascular: Negative for chest pain and palpitations  Gastrointestinal: Negative for abdominal pain and vomiting  Genitourinary: Negative for dysuria and hematuria  Musculoskeletal: Negative for arthralgias and back pain  Skin: Negative for color change and rash  Neurological: Negative for seizures and syncope  All other systems reviewed and are negative         Problem List:     Patient Active Problem List   Diagnosis   • Obsessive-compulsive disorder   • Hypertension   • Hypothyroidism   • Overactive bladder   • Allergic rhinitis   • Binge-eating disorder, severe   • Gambling disorder, moderate   • Routine screening for STI (sexually transmitted infection)   • BMI 50 0-59 9, adult ([facility])   • Dysfunction of both eustachian tubes   • Bulging lumbar disc   • Esophageal reflux   • EILEEN (generalized anxiety disorder)   • Lumbar degenerative disc disease   • Morbid obesity ([facility])   • Nocturnal enuresis   • PTSD (post-traumatic stress disorder)   • Urgency incontinence   • Vitamin D deficiency   • Chronic midline low back pain without sciatica   • Iliotibial band syndrome of left side   • Piriformis syndrome of left side   • Skin lesion   • Recurrent sinusitis   • IFG (impaired fasting glucose)   • Family history of cancer   • Intracranial hypertension   • Chronic migraine without aura without status migrainosus, not intractable   • Chronic tension-type headache, not intractable   • Menorrhagia with irregular cycle   • Encounter for insertion of mirena IUD   • CAIO (obstructive sleep apnea)   • IUD check up   • Class 3 severe obesity with body mass index (BMI) of 60 0 to 69 9 in adult [facility])   • Cervicalgia   • Numbness and tingling in both hands   • Dermatitis   • Chronic constipation   • Thyroid disease neuropathy ([facility] [address] )   • Encounter for medication monitoring   • Hypervolemia   • Migraine headache   • Hyperaldosteronism ([facility])   • Persistent proteinuria   • Chronic ethmoidal sinusitis   • Chronic maxillary sinusitis      Past Medical and Surgical History:     Past Medical History:   Diagnosis Date   • Allergic    • Allergic rhinitis    • Anorexia nervosa in remission    • Anxiety    • Arthritis 5/8/2014   • Back pain    • Bipolar disorder ([facility] [address] )    • Depression    • Disease of thyroid gland    • Diverticulitis of colon 9/20/2015   • Dizziness    • Ear problems    • Eating disorder    • Esophageal reflux 08/15/2013   • GERD (gastroesophageal reflux disease) 8/15/2013   • Headache(784 0)    • Hypertension    • Hypothyroid    • Idiopathic intracranial hypertension    • Lumbar degenerative disc disease 05/08/2014   • Migraine    • Nasal congestion    • Nosebleed    • Obesity    • Obsessive-compulsive disorder    • Otitis media    • Overactive bladder    • Psychiatric disorder    • Restless leg syndrome, controlled    • Seasonal allergies    • Sinusitis    • Sleep difficulties    • Suicide and [facility]inflicted injury ([facility] )    • Tinnitus    • TMJ dysfunction    • Tonsillitis    • Transcranial Magnetic Stimulation 09/06/2021   • Urinary tract infection      Past Surgical History:   Procedure Laterality Date   • DENTAL SURGERY      wisdom teeth-at [age] years   Other surgery dental extraxtion of bone spur (10 years ago)   • IR LUMBAR PUNCTURE  02/26/2019   • SINUS ENDOSCOPY     • SINUS SURGERY     • TONSILLECTOMY        Family History:     Family History   Problem Relation Age of Onset   • Mental illness Mother    • Depression Mother    • Suicide Attempts Mother    • Hypertension Mother    • Diabetes Mother    • Other Mother         bicuspid aortic valve   • Anxiety disorder Mother    • Psychiatric Illness Mother    • Schizoaffective Disorder  Mother    • Hypertension Father    • Hypothyroidism Father    • Thyroid disease Father    • Hypertension Brother    • Cancer Maternal Grandmother    • Heart disease Maternal Grandmother    • Stroke Maternal Grandmother    • Breast cancer Maternal Grandmother    • Skin cancer Maternal Grandmother    • Arthritis Maternal Grandmother    • Pneumonia Maternal Grandfather    • Cancer Maternal Grandfather    • Dementia Maternal Grandfather    • COPD Maternal Grandfather    • Cancer Paternal Grandmother    • Pneumonia Paternal Grandfather    • Arthritis Family    • Breast cancer Family    • Osteopenia Family    • Osteoporosis Family    • Hypertension Family    • Transient ischemic attack Family       Social History:     Social History     Socioeconomic History   • Marital status: Single     Spouse name: None   • Number of children: None   • Years of education: None   • Highest education level: None   Occupational History   • Occupation: DISABLED   Tobacco Use   • Smoking status: Never   • Smokeless tobacco: Never   Vaping Use   • Vaping Use: Never used   Substance and Sexual Activity   • Alcohol use: No   • Drug use: No   • Sexual activity: Yes     Partners: Female     Comment: No STDs   Other Topics Concern   • None   Social History Narrative    Always uses seat belts    Caffeine use     Disabled - permament disability since 2008 due to psychiatric illness    Has no children    Single     Tea     Social Determinants of Health     Financial Resource Strain: Medium Risk   • Difficulty of Paying Living Expenses: Somewhat hard   Food Insecurity: Not on file   Transportation Needs: No Transportation Needs   • Lack of Transportation (Medical): No   • Lack of Transportation (Non-Medical):  No   Physical Activity: Not on file   Stress: Not on file   Social Connections: Not on file   Intimate Partner Violence: Not on file   Housing Stability: Not on file      Medications and Allergies:     Current Outpatient Medications   Medication Sig Dispense Refill   • celecoxib (CeleBREX) 200 mg capsule TAKE 1 CAPSULE BY MOUTH TWICE A DAY 60 capsule 4   • Cequa 0 09 % SOLN instill 1 drop into both eyes twice a day     • Cholecalciferol (VITAMIN D3) 5000 units CAPS Take 5,000 Units by mouth daily     • clomiPRAMINE (ANAFRANIL) 50 mg capsule Take by mouth TAKE 2 CAPSULES IN AM AND 3 CAPSULES IN PM     • Emgality 120 MG/ML SOAJ INJECT 120 MG UNDER THE SKIN EVERY 30 DAYS  3 mL 1   • fluticasone (VERAMYST) 27 5 MCG/SPRAY nasal spray 2 sprays into each nostril daily 9 1 mL 5   • folic acid (FOLVITE) 1 mg tablet      • furosemide (LASIX) 40 mg tablet TAKE 1/2 TABLET BY MOUTH DAILY OR 1 AND 1/2 TABS IF WEIGHT GAIN OF 3 POUNDS IN ONE  tablet 2   • gabapentin (NEURONTIN) 600 MG tablet TAKE 1/2 TABLET DAILY IN AM AND 1 AND 1/2 TABLET AT BEDTIME     • levothyroxine 125 mcg tablet TAKE 1 TABLET BY MOUTH EVERY DAY IN THE MORNING 90 tablet 1   • Linzess 290 MCG CAPS TAKE 1 CAPSULE BY MOUTH EVERY DAY 30 capsule 5   • lisdexamfetamine (VYVANSE) 50 MG capsule Take 50 mg by mouth every morning     • loratadine (CLARITIN) 10 mg tablet Take 10 mg by mouth daily     • LORazepam (ATIVAN) 0 5 mg tablet      • loteprednol etabonate (LOTEMAX) 0 5 % ophthalmic suspension Administer 1 drop to both eyes 2 (two) times a day     • metFORMIN (GLUCOPHAGE) 500 mg tablet Take 1 tablet (500 mg total) by mouth daily with breakfast 90 tablet 0   • Multiple Vitamin (MULTIVITAMIN) capsule Take 1 capsule by mouth daily     • omeprazole (PriLOSEC) 20 mg delayed release capsule Take 1 capsule (20 mg total) by mouth 2 (two) times a day 180 capsule 1   • PARoxetine (PAXIL) 30 mg tablet Take 30 mg by mouth daily     • propranolol (INDERAL) 20 mg tablet Take 20 mg by mouth every 12 (twelve) hours     • Rimegepant Sulfate (Nurtec) 75 MG TBDP Take 75 mg by mouth every other day    If there is a migraine on a non-Nurtec day, can take a dose and skip the following day 16 tablet 0   • Semaglutide-Weight Management (WEGOVY) 0 25 MG/0 5ML Inject 0 5 mL (0 25 mg total) under the skin once a week for 4 doses 2 mL 0   • spironolactone (ALDACTONE) 50 mg tablet TAKE 1 TABLET BY MOUTH EVERY DAY 90 tablet 1   • SUMAtriptan (IMITREX) 100 mg tablet 1 tab as needed for migraine, can repeat once in 2 hours, max 200mg in 24 hours   12 tablet 3   • topiramate (TOPAMAX) 100 mg tablet Take 1 5 tablets (150 mg total) by mouth 2 (two) times a day 270 tablet 1   • Vraylar 3 MG capsule Take 3 mg by mouth daily Daily in the evening     • Vraylar 6 MG capsule Take 6 mg by mouth daily Daily in the morning     • dexamethasone (DECADRON) 4 mg tablet 1 tab daily X 3 days, then 1/2 tab daily X 4 more days (Patient not taking: Reported on 3/16/2023) 5 tablet 0   • HYDROcodone-acetaminophen (Norco) 5-325 mg per tablet Take 1 tablet by mouth every 6 (six) hours as needed for pain Initial Rx Max Daily Amount: 4 tablets (Patient not taking: Reported on 3/16/2023) 6 tablet 0   • hydrocortisone-acetic acid (VOSOL-HC) otic solution Administer 3 drops into both ears every 6 (six) hours (Patient not taking: Reported on 1/5/2023) 10 mL 0   • mupirocin (BACTROBAN) 2 % ointment Apply topically 3 (three) times a day (Patient not taking: Reported on 3/16/2023) 30 g 0   • sertraline (ZOLOFT) 100 mg tablet Take 100 mg by mouth daily  (Patient not taking: Reported on 3/9/2023)  0   • Xiidra 5 % op solution  (Patient not taking: Reported on 3/9/2023)       Current Facility-Administered Medications   Medication Dose Route Frequency Provider Last Rate Last Admin   • Botulinum Toxin Type A SOLR 200 Units  200 Units Injection Q3 Months Arlet Doyle PA-C   200 Units at 04/01/21 1453   • Botulinum Toxin Type A SOLR 200 Units  200 Units Injection Q3 Months Cj Quiroga MD   200 Units at 07/28/21 1211     Allergies   Allergen Reactions   • Doxycycline      Pt refuses d/t remote H/O intracranial hypertension    • Other      Seasonal allergies       Immunizations:     Immunization History   Administered Date(s) Administered   • INFLUENZA 10/19/2015, 10/12/2017, 08/29/2018, 09/28/2018, 09/16/2020, 10/16/2021   • Influenza Injectable, MDCK, Preservative Free, Quadrivalent 10/08/2019   • Influenza Injectable, MDCK, Preservative Free, Quadrivalent, 0 5 mL 10/08/2019   • Influenza Quadrivalent Preservative Free 3 years and older IM 10/12/2017   • Influenza, injectable, quadrivalent, preservative free 0 5 mL 08/29/2018, 09/16/2020, 10/16/2021   • Influenza, seasonal, injectable 10/07/2014, 10/19/2015   • Tdap 02/13/2019   • Tuberculin Skin Test-PPD Intradermal 08/20/2018      Health Maintenance:         Topic Date Due   • Cervical Cancer Screening  02/26/2021   • HIV Screening  04/22/2023 (Originally 8/18/2000)   • Hepatitis C Screening  03/22/2024 (Originally 1985)         Topic Date Due   • COVID-19 Vaccine (1) Never done   • Influenza Vaccine (1) 09/01/2022      Medicare Screening Tests and Risk Assessments:     Juanita Joaquin is here for her Subsequent Wellness visit  Health Risk Assessment:   Patient rates overall health as fair  Patient feels that their physical health rating is same  Patient is satisfied with their life  Eyesight was rated as same  Hearing was rated as same  Patient feels that their emotional and mental health rating is same  Patients states they are never, rarely angry  Patient states they are often unusually tired/fatigued  Pain experienced in the last 7 days has been some  Patient's pain rating has been 5/10  Patient states that she has experienced weight loss or gain in last 6 months  Depression Screening:   PHQ-2 Score: 3  PHQ-9 Score: 17      Fall Risk Screening: In the past year, patient has experienced: no history of falling in past year      Urinary Incontinence Screening:   Patient has leaked urine accidently in the last six months  Home Safety:  Patient does not have trouble with stairs inside or outside of their home  Patient has working smoke alarms and has no working carbon monoxide detector  Home safety hazards include: none  Nutrition:   Current diet is Unhealthy  Medications:   Patient is not currently taking any over-the-counter supplements  Patient is able to manage medications  Activities of Daily Living (ADLs)/Instrumental Activities of Daily Living (IADLs):   Walk and transfer into and out of bed and chair?: Yes  Dress and groom yourself?: Yes    Bathe or shower yourself?: Yes    Feed yourself? Yes  Do your laundry/housekeeping?: Yes  Manage your money, pay your bills and track your expenses?: Yes  Make your own meals?: Yes    Do your own shopping?: Yes    Previous Hospitalizations:   Any hospitalizations or ED visits within the last 12 months?: Yes    How many hospitalizations have you had in the last year?: 1-2    Advance Care Planning:   Living will: No    Durable POA for healthcare: No    Advanced directive: No      PREVENTIVE SCREENINGS      Cardiovascular Screening:    General: Screening Current      Diabetes Screening:     General: Screening Current      Colorectal Cancer Screening:     General: Screening Current      Breast Cancer Screening:     General: Screening Not Indicated      Cervical Cancer Screening:    General: Screening Current      Osteoporosis Screening:    General: Screening Not Indicated      Abdominal Aortic Aneurysm (AAA) Screening:        General: Screening Not Indicated      Lung Cancer Screening:     General: Screening Not Indicated      Hepatitis C Screening:    General: Risks and Benefits Discussed and Patient Declines    Screening, Brief Intervention, and Referral to Treatment (SBIRT)    Screening  Typical number of drinks in a day: 0  Typical number of drinks in a week: 0  Interpretation: Low risk drinking behavior      AUDIT-C Screenin) How often did you have a drink containing alcohol in the past year? never  2) How many drinks did you have on a typical day when you were drinking in the past year? 0  3) How often did you have 6 or more drinks on one occasion in the past year? never    AUDIT-C Score: 0  Interpretation: Score 0-2 (female): Negative screen for alcohol misuse    Single Item Drug Screening:  How often have you used an illegal drug (including marijuana) or a prescription medication for non-medical reasons in the past year? never    Single Item Drug Screen Score: 0  Interpretation: Negative screen for possible drug use disorder    No results found  Physical Exam:     /70   Pulse 75   Temp (!) 97 3 °F (36 3 °C)   Ht 5' 7" (1 702 m)   Wt (!) 182 kg (401 lb)   SpO2 99%   BMI 62 81 kg/m²     Physical Exam  Vitals and nursing note reviewed  Constitutional:       General: She is not in acute distress  Appearance: She is well-developed  She is obese  HENT:      Head: Normocephalic and atraumatic  Right Ear: Tympanic membrane normal       Left Ear: Tympanic membrane normal       Nose: Nose normal       Mouth/Throat:      Mouth: Mucous membranes are moist       Pharynx: Oropharynx is clear  Eyes:      Conjunctiva/sclera: Conjunctivae normal    Cardiovascular:      Rate and Rhythm: Normal rate and regular rhythm  Heart sounds: No murmur heard  Pulmonary:      Effort: Pulmonary effort is normal  No respiratory distress  Breath sounds: Normal breath sounds  Abdominal:      General: Bowel sounds are normal       Palpations: Abdomen is soft  Tenderness: There is no abdominal tenderness  Musculoskeletal:         General: No swelling  Cervical back: Neck supple  Lymphadenopathy:      Cervical: No cervical adenopathy  Skin:     General: Skin is warm and dry  Capillary Refill: Capillary refill takes less than 2 seconds  Neurological:      General: No focal deficit present  Mental Status: She is alert and oriented to person, place, and time            Pao Maciel MD

## 2023-03-22 NOTE — PATIENT INSTRUCTIONS
Medicare Preventive Visit Patient Instructions  Thank you for completing your Welcome to Medicare Visit or Medicare Annual Wellness Visit today  Your next wellness visit will be due in one year (3/22/2024)  The screening/preventive services that you may require over the next 5-10 years are detailed below  Some tests may not apply to you based off risk factors and/or age  Screening tests ordered at today's visit but not completed yet may show as past due  Also, please note that scanned in results may not display below  Preventive Screenings:  Service Recommendations Previous Testing/Comments   Colorectal Cancer Screening  * Colonoscopy    * Fecal Occult Blood Test (FOBT)/Fecal Immunochemical Test (FIT)  * Fecal DNA/Cologuard Test  * Flexible Sigmoidoscopy Age: 39-70 years old   Colonoscopy: every 10 years (may be performed more frequently if at higher risk)  OR  FOBT/FIT: every 1 year  OR  Cologuard: every 3 years  OR  Sigmoidoscopy: every 5 years  Screening may be recommended earlier than age 39 if at higher risk for colorectal cancer  Also, an individualized decision between you and your healthcare provider will decide whether screening between the ages of 74-80 would be appropriate  Colonoscopy: 10/15/2021  FOBT/FIT: Not on file  Cologuard: Not on file  Sigmoidoscopy: Not on file          Breast Cancer Screening Age: 36 years old  Frequency: every 1-2 years  Not required if history of left and right mastectomy Mammogram: 12/06/2018    Screening Not Indicated   Cervical Cancer Screening Between the ages of 21-29, pap smear recommended once every 3 years  Between the ages of 33-67, can perform pap smear with HPV co-testing every 5 years     Recommendations may differ for women with a history of total hysterectomy, cervical cancer, or abnormal pap smears in past  Pap Smear: 03/06/2020        Hepatitis C Screening Once for adults born between 1945 and 1965  More frequently in patients at high risk for Hepatitis C Hep C Antibody: Not on file        Diabetes Screening 1-2 times per year if you're at risk for diabetes or have pre-diabetes Fasting glucose: 98 mg/dL (12/30/2022)  A1C: 5 6 % (3/8/2022)  Screening Current   Cholesterol Screening Once every 5 years if you don't have a lipid disorder  May order more often based on risk factors  Lipid panel: 09/14/2022    Screening Current     Other Preventive Screenings Covered by Medicare:  1  Abdominal Aortic Aneurysm (AAA) Screening: covered once if your at risk  You're considered to be at risk if you have a family history of AAA  2  Lung Cancer Screening: covers low dose CT scan once per year if you meet all of the following conditions: (1) Age 50-69; (2) No signs or symptoms of lung cancer; (3) Current smoker or have quit smoking within the last 15 years; (4) You have a tobacco smoking history of at least 20 pack years (packs per day multiplied by number of years you smoked); (5) You get a written order from a healthcare provider  3  Glaucoma Screening: covered annually if you're considered high risk: (1) You have diabetes OR (2) Family history of glaucoma OR (3)  aged 48 and older OR (3)  American aged 72 and older  3  Osteoporosis Screening: covered every 2 years if you meet one of the following conditions: (1) You're estrogen deficient and at risk for osteoporosis based off medical history and other findings; (2) Have a vertebral abnormality; (3) On glucocorticoid therapy for more than 3 months; (4) Have primary hyperparathyroidism; (5) On osteoporosis medications and need to assess response to drug therapy  · Last bone density test (DXA Scan): Not on file  5  HIV Screening: covered annually if you're between the age of 12-76  Also covered annually if you are younger than 13 and older than 72 with risk factors for HIV infection  For pregnant patients, it is covered up to 3 times per pregnancy      Immunizations:  Immunization Recommendations Influenza Vaccine Annual influenza vaccination during flu season is recommended for all persons aged >= 6 months who do not have contraindications   Pneumococcal Vaccine   * Pneumococcal conjugate vaccine = PCV13 (Prevnar 13), PCV15 (Vaxneuvance), PCV20 (Prevnar 20)  * Pneumococcal polysaccharide vaccine = PPSV23 (Pneumovax) Adults 25-60 years old: 1-3 doses may be recommended based on certain risk factors  Adults 72 years old: 1-2 doses may be recommended based off what pneumonia vaccine you previously received   Hepatitis B Vaccine 3 dose series if at intermediate or high risk (ex: diabetes, end stage renal disease, liver disease)   Tetanus (Td) Vaccine - COST NOT COVERED BY MEDICARE PART B Following completion of primary series, a booster dose should be given every 10 years to maintain immunity against tetanus  Td may also be given as tetanus wound prophylaxis  Tdap Vaccine - COST NOT COVERED BY MEDICARE PART B Recommended at least once for all adults  For pregnant patients, recommended with each pregnancy  Shingles Vaccine (Shingrix) - COST NOT COVERED BY MEDICARE PART B  2 shot series recommended in those aged 48 and above     Health Maintenance Due:      Topic Date Due   • Hepatitis C Screening  Never done   • HIV Screening  Never done   • Cervical Cancer Screening  02/26/2021     Immunizations Due:      Topic Date Due   • COVID-19 Vaccine (1) Never done   • Influenza Vaccine (1) 09/01/2022     Advance Directives   What are advance directives? Advance directives are legal documents that state your wishes and plans for medical care  These plans are made ahead of time in case you lose your ability to make decisions for yourself  Advance directives can apply to any medical decision, such as the treatments you want, and if you want to donate organs  What are the types of advance directives? There are many types of advance directives, and each state has rules about how to use them   You may choose a combination of any of the following:  · Living will: This is a written record of the treatment you want  You can also choose which treatments you do not want, which to limit, and which to stop at a certain time  This includes surgery, medicine, IV fluid, and tube feedings  · Durable power of  for healthcare [facility]): This is a written record that states who you want to make healthcare choices for you when you are unable to make them for yourself  This person, called a proxy, is usually a family member or a friend  You may choose more than 1 proxy  · Do not resuscitate (DNR) order:  A DNR order is used in case your heart stops beating or you stop breathing  It is a request not to have certain forms of treatment, such as CPR  A DNR order may be included in other types of advance directives  · Medical directive: This covers the care that you want if you are in a coma, near death, or unable to make decisions for yourself  You can list the treatments you want for each condition  Treatment may include pain medicine, surgery, blood transfusions, dialysis, IV or tube feedings, and a ventilator (breathing machine)  · Values history: This document has questions about your views, beliefs, and how you feel and think about life  This information can help others choose the care that you would choose  Why are advance directives important? An advance directive helps you control your care  Although spoken wishes may be used, it is better to have your wishes written down  Spoken wishes can be misunderstood, or not followed  Treatments may be given even if you do not want them  An advance directive may make it easier for your family to make difficult choices about your care  Depression   Depression  is a medical condition that causes feelings of sadness or hopelessness that do not go away  Depression may cause you to lose interest in things you used to enjoy  These feelings may interfere with your daily life    Call your local emergency number (911 in the [address] [place] [address] [address]) if:   · You think about harming yourself or someone else  · You have done something on purpose to hurt yourself  The following resources are available at any time to help you, if needed:   · [address]: 8-824.426.7046 (5-955-713-OKRU)   · Suicide Hotline: 7-215.935.1627 (2-072-UKNXGHB)   · For a list of international numbers: https://save org/find-help/international-resources/  Treatment for depression may include medicine to relieve depression  Medicine is often used together with therapy  Therapy is a way for you to talk about your feelings and anything that may be causing depression  Therapy can be done alone or in a group  It may also be done with family members or a significant other  · Get regular physical activity  · Create a regular sleep schedule  · Eat a variety of healthy foods  · Do not drink alcohol or use drugs  Urinary Incontinence   Urinary incontinence (UI)  is when you lose control of your bladder  UI develops because your bladder cannot store or empty urine properly  The 3 most common types of UI are stress incontinence, urge incontinence, or both  Medicines:   · May be given to help strengthen your bladder control  Report any side effects of medication to your healthcare provider  Do pelvic muscle exercises often:  Your pelvic muscles help you stop urinating  Squeeze these muscles tight for 5 seconds, then relax for 5 seconds  Gradually work up to squeezing for 10 seconds  Do 3 sets of 15 repetitions a day, or as directed  This will help strengthen your pelvic muscles and improve bladder control  Train your bladder:  Go to the bathroom at set times, such as every 2 hours, even if you do not feel the urge to go  You can also try to hold your urine when you feel the urge to go  For example, hold your urine for 5 minutes when you feel the urge to go  As that becomes easier, hold your urine for 10 minutes  Self-care:   · Keep a UI record  Write down how often you leak urine and how much you leak  Make a note of what you were doing when you leaked urine  · Drink liquids as directed  You may need to limit the amount of liquid you drink to help control your urine leakage  Do not drink any liquid right before you go to bed  Limit or do not have drinks that contain caffeine or alcohol  · Prevent constipation  Eat a variety of high-fiber foods  Good examples are high-fiber cereals, beans, vegetables, and whole-grain breads  Walking is the best way to trigger your intestines to have a bowel movement  · Exercise regularly and maintain a healthy weight  Weight loss and exercise will decrease pressure on your bladder and help you control your leakage  · Use a catheter as directed  to help empty your bladder  A catheter is a tiny, plastic tube that is put into your bladder to drain your urine  · Go to behavior therapy as directed  Behavior therapy may be used to help you learn to control your urge to urinate  Weight Management   Why it is important to manage your weight:  Being overweight increases your risk of health conditions such as heart disease, high blood pressure, type 2 diabetes, and certain types of cancer  It can also increase your risk for osteoarthritis, sleep apnea, and other respiratory problems  Aim for a slow, steady weight loss  Even a small amount of weight loss can lower your risk of health problems  How to lose weight safely:  A safe and healthy way to lose weight is to eat fewer calories and get regular exercise  You can lose up about 1 pound a week by decreasing the number of calories you eat by 500 calories each day  Healthy meal plan for weight management:  A healthy meal plan includes a variety of foods, contains fewer calories, and helps you stay healthy  A healthy meal plan includes the following:  · Eat whole-grain foods more often  A healthy meal plan should contain fiber   Fiber is the part of grains, fruits, and vegetables that is not broken down by your body  Whole-grain foods are healthy and provide extra fiber in your diet  Some examples of whole-grain foods are whole-wheat breads and pastas, oatmeal, brown rice, and bulgur  · Eat a variety of vegetables every day  Include dark, leafy greens such as spinach, kale, jaquan greens, and mustard greens  Eat yellow and orange vegetables such as carrots, sweet potatoes, and winter squash  · Eat a variety of fruits every day  Choose fresh or canned fruit (canned in its own juice or light syrup) instead of juice  Fruit juice has very little or no fiber  · Eat low-fat dairy foods  Drink fat-free (skim) milk or 1% milk  Eat fat-free yogurt and low-fat cottage cheese  Try low-fat cheeses such as mozzarella and other reduced-fat cheeses  · Choose meat and other protein foods that are low in fat  Choose beans or other legumes such as split peas or lentils  Choose fish, skinless poultry (chicken or turkey), or lean cuts of red meat (beef or pork)  Before you cook meat or poultry, cut off any visible fat  · Use less fat and oil  Try baking foods instead of frying them  Add less fat, such as margarine, sour cream, regular salad dressing and mayonnaise to foods  Eat fewer high-fat foods  Some examples of high-fat foods include french fries, doughnuts, ice cream, and cakes  · Eat fewer sweets  Limit foods and drinks that are high in sugar  This includes candy, cookies, regular soda, and sweetened drinks  Exercise:  Exercise at least 30 minutes per day on most days of the week  Some examples of exercise include walking, biking, dancing, and swimming  You can also fit in more physical activity by taking the stairs instead of the elevator or parking farther away from stores  Ask your healthcare provider about the best exercise plan for you        © Copyright [employer] 2018 Information is for End User's use only and may not be sold, redistributed or otherwise used for commercial purposes   All illustrations and images included in CareNotes® are the copyrighted property of A D A M , Inc  or [facility]

## 2023-03-23 ENCOUNTER — TELEPHONE (OUTPATIENT)
Dept: ADMINISTRATIVE | Facility: OTHER | Age: 38
End: 2023-03-23

## 2023-03-23 NOTE — TELEPHONE ENCOUNTER
----- Message from Kimberly Pathak MD sent at 3/22/2023 11:24 AM EDT -----  Regarding: care gap request  03/22/23 11:24 AM    Hello, our patient attached above has had Pap Smear (HPV) aka Cervical Cancer Screening completed/performed  Please assist in updating the patient chart by pulling the Care Everywhere (CE) document  The date of service is 5/11/2022       Thank you,  Kimberly Pathak MD  [facility] PR[facility]

## 2023-03-24 NOTE — TELEPHONE ENCOUNTER
Upon review of the In Basket request we were able to locate, review, and update the patient chart as requested for Pap Smear (HPV) aka Cervical Cancer Screening  Any additional questions or concerns should be emailed to the Practice Liaisons via the appropriate education email address, please do not reply via In Basket      Thank you  Cintia Stewart

## 2023-03-27 ENCOUNTER — TELEPHONE (OUTPATIENT)
Dept: BARIATRICS | Facility: CLINIC | Age: 38
End: 2023-03-27

## 2023-03-27 DIAGNOSIS — G43.709 CHRONIC MIGRAINE WITHOUT AURA WITHOUT STATUS MIGRAINOSUS, NOT INTRACTABLE: ICD-10-CM

## 2023-03-27 DIAGNOSIS — G43.719 INTRACTABLE CHRONIC MIGRAINE WITHOUT AURA AND WITHOUT STATUS MIGRAINOSUS: ICD-10-CM

## 2023-03-27 RX ORDER — RIMEGEPANT SULFATE 75 MG/75MG
75 TABLET, ORALLY DISINTEGRATING ORAL EVERY OTHER DAY
Qty: 16 TABLET | Refills: 2 | Status: SHIPPED | OUTPATIENT
Start: 2023-03-27

## 2023-03-27 RX ORDER — TOPIRAMATE 100 MG/1
150 TABLET, FILM COATED ORAL 2 TIMES DAILY
Qty: 270 TABLET | Refills: 1 | Status: SHIPPED | OUTPATIENT
Start: 2023-03-27

## 2023-03-27 NOTE — TELEPHONE ENCOUNTER
Patient called back and stated her pharmacy cant get the wegovy in but they'll try again   She wants to know what she should do because she will be running out of [employer] soon prior to her getting the wegovy  Please advise

## 2023-03-27 NOTE — TELEPHONE ENCOUNTER
Hi, this is Lizzie Martinez  My birthday is August 18th, 1985  I'm calling because I need a refill of both my topamax and the Nurtec, but the topamax I've been going up on my dose  So I believe there was something last time when it was doing this where the insurance had to get involved  I am currently taking the topamax 100 mg but I am taking 1 5 tabs in the morning and 1 5 tabs in the evening  And the Nurtec is just the same that I have been taking  [facility] [place]  That is the one in my chart  My phone number is [phone]  Once again, it's Lizzie Martinez  Thank you  Chart reviewed: Topiramate script, 90 day supply w/ 1 refill and Nurtec, 16 tabs was sent to [facility] pharmacy 3/2/23    Called [phone] pt to let her know that I received her message and will forward it to Dr Tran Russo entered   Pls review and sign off      thanks

## 2023-03-28 NOTE — TELEPHONE ENCOUNTER
Pt's pharmacy [facility] in [place] doesn't have a prior authorization for the wegovy patient asked if we could send this over

## 2023-03-28 NOTE — TELEPHONE ENCOUNTER
Prior auth started for patients Wegovy    Marked as urgent    [place] Em (Olivares: Doak Cranker)

## 2023-03-30 ENCOUNTER — TELEPHONE (OUTPATIENT)
Dept: NEUROLOGY | Facility: CLINIC | Age: 38
End: 2023-03-30

## 2023-03-30 NOTE — TELEPHONE ENCOUNTER
LMOM for the patient to call us back to reschedule her appt with Jose Cruz Right since she will not be in the office

## 2023-03-30 NOTE — TELEPHONE ENCOUNTER
Patient called back let her know the prior authorization has been started for the wegovy  Patient wants to know what lucho recommends due to her going to be running out of Soumy[employer] Truong in a day or two and depending on how long it takes for the wegovy prior authorization to get approved and sent to pharmacy?    Please advise

## 2023-03-31 ENCOUNTER — PATIENT MESSAGE (OUTPATIENT)
Dept: BARIATRICS | Facility: CLINIC | Age: 38
End: 2023-03-31

## 2023-03-31 NOTE — TELEPHONE ENCOUNTER
Pt received a letter in the mail today stating that Jennie Escobedo is not covered  Pt is confused and would like a call back to discuss other options    I advised Pt to upload letter via [address] [place] [address] [address]

## 2023-03-31 NOTE — TELEPHONE ENCOUNTER
Patient is rescheduled with Karyna Montoya  Patients appointment is now on 5/18/23 at 1:15pm with Karyna Montoya in [place]

## 2023-04-05 ENCOUNTER — TELEPHONE (OUTPATIENT)
Dept: NEUROLOGY | Facility: CLINIC | Age: 38
End: 2023-04-05

## 2023-04-05 NOTE — TELEPHONE ENCOUNTER
Message received  Hi, I am calling from [facility] ,regarding Prior authorization approval for Emgality ,we got it approved for 1 year with ref# [id number],RQ will be faxing this information as well  CB# [phone]    Called pt and advised that her Emgality was approved, stated that she got the call also  Advised pt to follow up with her pharmacy

## 2023-04-05 NOTE — TELEPHONE ENCOUNTER
received vm-My name is Avani Chávez, YOB: 1985  I'm calling, [facility] [place] is trying to [facility] my emgality and they had said that they needed a prior authorization, i guess because of the new year, it wasn't filled yet  I don't know if there was any communication about that yet, but I just was checking what was going on  if you could call me back  Like I said, my name is Avani Chávez, birth is August 18th 1985 phone number is [phone]  Thank you   -----------------------------  Emgality PA completed on [facility]  Key-[id number]    Called pt and made her aware that PA was submitted  Confirmed with pt her migraines have lessened with the combo of all her medications    Has about 3 a month  Advised that we would call her back with determination  [phone] to leave detailed message

## 2023-04-06 NOTE — TELEPHONE ENCOUNTER
Patients secondary insurance keeps sending back that she needs to go through her primary insurance which is Medicare and Medicare already denied medication  Provider was able to speak to our Unique Meeta tillman and she stated we need to start a new prior auth and put in comments that this was denied by her primary and needs secondary to pick it up        New prior auth started    Lizzie Martinez (Olivares: [id number])

## 2023-04-25 ENCOUNTER — APPOINTMENT (EMERGENCY)
Dept: CT IMAGING | Facility: HOSPITAL | Age: 38
End: 2023-04-25

## 2023-04-25 ENCOUNTER — HOSPITAL ENCOUNTER (EMERGENCY)
Facility: HOSPITAL | Age: 38
Discharge: HOME | End: 2023-04-25
Attending: EMERGENCY MEDICINE

## 2023-04-25 VITALS
TEMPERATURE: 98.4 F | DIASTOLIC BLOOD PRESSURE: 76 MMHG | RESPIRATION RATE: 18 BRPM | HEART RATE: 67 BPM | WEIGHT: 293 LBS | OXYGEN SATURATION: 100 % | BODY MASS INDEX: 63.29 KG/M2 | SYSTOLIC BLOOD PRESSURE: 138 MMHG
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
## 2023-04-25 RX ORDER — BACITRACIN, NEOMYCIN, POLYMYXIN B 400; 3.5; 5 [USP'U]/G; MG/G; [USP'U]/G
1 OINTMENT TOPICAL ONCE
Status: DISCONTINUED | OUTPATIENT
Start: 2023-04-25 | End: 2023-04-25 | Stop reason: HOSPADM

## 2023-04-25 RX ORDER — ACETAMINOPHEN 325 MG/1
650 TABLET ORAL ONCE
Status: COMPLETED | OUTPATIENT
Start: 2023-04-25 | End: 2023-04-25

## 2023-04-25 RX ADMIN — ACETAMINOPHEN 325MG 650 MG: 325 TABLET ORAL at 10:05

## 2023-04-25 NOTE — ED ATTENDING ATTESTATION
"4/25/2023  IArleth, DO, saw and evaluated the patient  I have discussed the patient with the resident/non-physician practitioner and agree with the resident's/non-physician practitioner's findings, Plan of Care, and MDM as documented in the resident's/non-physician practitioner's note, except where noted  All available labs and Radiology studies were reviewed  I was present for key portions of any procedure(s) performed by the resident/non-physician practitioner and I was immediately available to provide assistance  At this point I agree with the current assessment done in the Emergency Department  I have conducted an independent evaluation of this patient a history and physical is as follows:    Patient reports for evaluation of possible head injury after being yanked by her dog and stumbling into a building  She struck her occiput or right parietal area, but is uncertain  She denies loss of consciousness and has not had persistent nausea/vomiting  She feels \"foggy\" but family reports she is at her baseline  She does not take any anticoagulant or antiplatelet medications  She did also fall to the ground onto her right leg, incurring linear abrasions over her right leg  She was ambulatory after the fall and has no other complaints of injury  ROS: Denies visual change, LH/dizziness, midline neck or back pain, CP, SOB, abdominal pain, n/v  12 system ROS o/w negative      PE: NAD, appears comfortable, alert, GCS 15; PERRL, EOMI; no hemotympanum; no septal hematoma or epistaxis; MMM, no post OP exudate, edema or erythema, no dental trauma; no midline vert TTP, no crepitus or step-offs; HRR, no murmur; lungs CTA w/o w/r/r, POx 100% on RA (nl); abd s/nt/nd, nl BS in all four quadrants, morbid obesity; (-) LE edema, no calf TTP, stable pelvis, FROM extremities x4, strength and sensation appear intact; skin on right lateral leg has multiple linear abrasions without bleeding, gaping or evidence of " infection, otherwise skin is p/w/d without evidence of traumatic injury over the scalp; CN II-XII GI/NF, oriented  MDM: Fall with head injury - contusion without clinical evidence of skin injury, less likely but at risk for ICH, clinically doubt fracture  A/P: Will check CT head, treat symptoms, reevaluate for further work up and disposition      ED Course         Critical Care Time  Procedures

## 2023-04-25 NOTE — ED PROVIDER NOTES
"History  Chief Complaint   Patient presents with   • Fall     Pt states she tripped and fell this morning, hitting her head off of a building, no loc, no thinners  C/o headache     Patient is a 45 yo female with a PMH of chronic migraines presenting to the ED status post fall with head trauma this morning  She states her dog pulled her while on a walk, causing her to trip and fall, hitting her head hard on the side of a building  She reports feeling dizzy with a headache immediately after hitting head  She denies any LOC, confusion, visual changes, nausea or vomiting, loss of feeling, weakness, slurred speech, or memory loss since fall  In ED, she reports right-sided headache, mild right-sided neck pain, and states she feels \"fuzzy  \" She also has a scrape on her right leg from the fall  She denies any bumps or bleeding from head  No further complaints  She is not on any blood thinners  Prior to Admission Medications   Prescriptions Last Dose Informant Patient Reported? Taking? Cequa 0 09 % SOLN   Yes No   Sig: instill 1 drop into both eyes twice a day   Cholecalciferol (VITAMIN D3) 5000 units CAPS   Yes No   Sig: Take 5,000 Units by mouth daily   Emgality 120 MG/ML SOAJ   No No   Sig: INJECT 120 MG UNDER THE SKIN EVERY 30 DAYS  HYDROcodone-acetaminophen (Norco) 5-325 mg per tablet   No No   Sig: Take 1 tablet by mouth every 6 (six) hours as needed for pain Initial Rx Max Daily Amount: 4 tablets   Patient not taking: Reported on 3/16/2023   LORazepam (ATIVAN) 0 5 mg tablet   Yes No   Linzess 290 MCG CAPS   No No   Sig: TAKE 1 CAPSULE BY MOUTH EVERY DAY   Multiple Vitamin (MULTIVITAMIN) capsule   Yes No   Sig: Take 1 capsule by mouth daily   PARoxetine (PAXIL) 30 mg tablet   Yes No   Sig: Take 30 mg by mouth daily   Rimegepant Sulfate (Nurtec) 75 MG TBDP   No No   Sig: Take 75 mg by mouth every other day    If there is a migraine on a non-Nurtec day, can take a dose and skip the following day " SUMAtriptan (IMITREX) 100 mg tablet   No No   Si tab as needed for migraine, can repeat once in 2 hours, max 200mg in 24 hours     Vraylar 3 MG capsule   Yes No   Sig: Take 3 mg by mouth daily Daily in the evening   Vraylar 6 MG capsule   Yes No   Sig: Take 6 mg by mouth daily Daily in the morning   Xiidra 5 % op solution   Yes No   Patient not taking: Reported on 3/9/2023   celecoxib (CeleBREX) 200 mg capsule   No No   Sig: TAKE 1 CAPSULE BY MOUTH TWICE A DAY   clomiPRAMINE (ANAFRANIL) 50 mg capsule   Yes No   Sig: Take by mouth TAKE 2 CAPSULES IN AM AND 3 CAPSULES IN PM   dexamethasone (DECADRON) 4 mg tablet   No No   Si tab daily X 3 days, then 1/2 tab daily X 4 more days   Patient not taking: Reported on 3/16/2023   fluticasone (VERAMYST) 27 5 MCG/SPRAY nasal spray   No No   Si sprays into each nostril daily   folic acid (FOLVITE) 1 mg tablet   Yes No   furosemide (LASIX) 40 mg tablet   No No   Sig: TAKE 1/2 TABLET BY MOUTH DAILY OR 1 AND 1/2 TABS IF WEIGHT GAIN OF 3 POUNDS IN ONE DAY   gabapentin (NEURONTIN) 600 MG tablet   Yes No   Sig: TAKE 1/2 TABLET DAILY IN AM AND 1 AND 1/2 TABLET AT BEDTIME   hydrocortisone-acetic acid (VOSOL-HC) otic solution   No No   Sig: Administer 3 drops into both ears every 6 (six) hours   Patient not taking: Reported on 2023   levothyroxine 125 mcg tablet   No No   Sig: TAKE 1 TABLET BY MOUTH EVERY DAY IN THE MORNING   lisdexamfetamine (VYVANSE) 50 MG capsule   Yes No   Sig: Take 50 mg by mouth every morning   loratadine (CLARITIN) 10 mg tablet   Yes No   Sig: Take 10 mg by mouth daily   loteprednol etabonate (LOTEMAX) 0 5 % ophthalmic suspension   Yes No   Sig: Administer 1 drop to both eyes 2 (two) times a day   metFORMIN (GLUCOPHAGE) 500 mg tablet   No No   Sig: Take 1 tablet (500 mg total) by mouth daily with breakfast   mupirocin (BACTROBAN) 2 % ointment   No No   Sig: Apply topically 3 (three) times a day   Patient not taking: Reported on 3/16/2023 omeprazole (PriLOSEC) 20 mg delayed release capsule   No No   Sig: Take 1 capsule (20 mg total) by mouth 2 (two) times a day   propranolol (INDERAL) 20 mg tablet   Yes No   Sig: Take 20 mg by mouth every 12 (twelve) hours   sertraline (ZOLOFT) 100 mg tablet   Yes No   Sig: Take 100 mg by mouth daily    Patient not taking: Reported on 3/9/2023   spironolactone (ALDACTONE) 50 mg tablet   No No   Sig: TAKE 1 TABLET BY MOUTH EVERY DAY   topiramate (TOPAMAX) 100 mg tablet   No No   Sig: Take 1 5 tablets (150 mg total) by mouth 2 (two) times a day      Facility-Administered Medications Last Administration Doses Remaining   Botulinum Toxin Type A SOLR 200 Units 4/1/2021  2:53 PM    Botulinum Toxin Type A SOLR 200 Units 7/28/2021 12:11 PM           Past Medical History:   Diagnosis Date   • Allergic    • Allergic rhinitis    • Anorexia nervosa in remission    • Anxiety    • Arthritis 5/8/2014   • Back pain    • Bipolar disorder ([facility] [address] )    • Depression    • Disease of thyroid gland    • Diverticulitis of colon 9/20/2015   • Dizziness    • Ear problems    • Eating disorder    • Esophageal reflux 08/15/2013   • GERD (gastroesophageal reflux disease) 8/15/2013   • Headache(784 0)    • Hypertension    • Hypothyroid    • Idiopathic intracranial hypertension    • Lumbar degenerative disc disease 05/08/2014   • Migraine    • Nasal congestion    • Nosebleed    • Obesity    • Obsessive-compulsive disorder    • Otitis media    • Overactive bladder    • Psychiatric disorder    • Restless leg syndrome, controlled    • Seasonal allergies    • Sinusitis    • Sleep difficulties    • Suicide and self-inflicted injury ([facility] [address] )    • Tinnitus    • TMJ dysfunction    • Tonsillitis    • Transcranial Magnetic Stimulation 09/06/2021   • Urinary tract infection        Past Surgical History:   Procedure Laterality Date   • DENTAL SURGERY      wisdom teeth-at [age] years   Other surgery dental extraxtion of bone spur (10 years ago)   • IR LUMBAR PUNCTURE 02/26/2019   • SINUS ENDOSCOPY     • SINUS SURGERY     • TONSILLECTOMY         Family History   Problem Relation Age of Onset   • Mental illness Mother    • Depression Mother    • Suicide Attempts Mother    • Hypertension Mother    • Diabetes Mother    • Other Mother         bicuspid aortic valve   • Anxiety disorder Mother    • Psychiatric Illness Mother    • Schizoaffective Disorder  Mother    • Hypertension Father    • Hypothyroidism Father    • Thyroid disease Father    • Hypertension Brother    • Cancer Maternal Grandmother    • Heart disease Maternal Grandmother    • Stroke Maternal Grandmother    • Breast cancer Maternal Grandmother    • Skin cancer Maternal Grandmother    • Arthritis Maternal Grandmother    • Pneumonia Maternal Grandfather    • Cancer Maternal Grandfather    • Dementia Maternal Grandfather    • COPD Maternal Grandfather    • Cancer Paternal Grandmother    • Pneumonia Paternal Grandfather    • Arthritis Family    • Breast cancer Family    • Osteopenia Family    • Osteoporosis Family    • Hypertension Family    • Transient ischemic attack Family      I have reviewed and agree with the history as documented  E-Cigarette/Vaping   • E-Cigarette Use Never User      E-Cigarette/Vaping Substances   • Nicotine No    • THC No    • CBD No    • Flavoring No    • Other No    • Unknown No      Social History     Tobacco Use   • Smoking status: Never   • Smokeless tobacco: Never   Vaping Use   • Vaping Use: Never used   Substance Use Topics   • Alcohol use: No   • Drug use: No        Review of Systems   Constitutional: Negative  HENT: Negative  Eyes: Negative  Negative for visual disturbance  Respiratory: Negative  Cardiovascular: Negative  Gastrointestinal: Negative  Musculoskeletal: Positive for neck pain (mild)  Negative for gait problem  Skin: Positive for wound (right leg)  Neurological: Positive for headaches (from head trauma)   Negative for dizziness, speech difficulty, weakness, light-headedness and numbness  Hematological: Does not bruise/bleed easily  Psychiatric/Behavioral: Negative for confusion  All other systems reviewed and are negative  Physical Exam  ED Triage Vitals [04/25/23 0937]   Temperature Pulse Respirations Blood Pressure SpO2   98 4 °F (36 9 °C) 72 18 130/84 100 %      Temp Source Heart Rate Source Patient Position - Orthostatic VS BP Location FiO2 (%)   Temporal Monitor Sitting Right arm --      Pain Score       5             Orthostatic Vital Signs  Vitals:    04/25/23 0937 04/25/23 0944 04/25/23 1030   BP: 130/84 130/84 138/76   Pulse: 72 72 67   Patient Position - Orthostatic VS: Sitting  Sitting       Physical Exam  Vitals reviewed  Constitutional:       General: She is awake  She is not in acute distress  Appearance: Normal appearance  She is morbidly obese  She is not ill-appearing, toxic-appearing or diaphoretic  HENT:      Head: Normocephalic and atraumatic  No abrasion, contusion, masses or laceration  Comments: No evidence of injury on scalp  Eyes:      General: No visual field deficit  Extraocular Movements: Extraocular movements intact  Cardiovascular:      Rate and Rhythm: Normal rate and regular rhythm  Pulmonary:      Effort: Pulmonary effort is normal    Musculoskeletal:         General: No tenderness  Normal range of motion  Cervical back: Normal range of motion and neck supple  No rigidity or tenderness  Skin:     General: Skin is warm and dry  Findings: Abrasion (on lateral right leg) present  Neurological:      General: No focal deficit present  Mental Status: She is alert and oriented to person, place, and time  Mental status is at baseline  Cranial Nerves: Cranial nerves 2-12 are intact  No dysarthria  Sensory: Sensation is intact  Motor: Motor function is intact  No weakness  Coordination: Coordination is intact  Gait: Gait is intact     Psychiatric: Attention and Perception: Attention normal          Mood and Affect: Mood and affect normal          Speech: Speech normal          Behavior: Behavior normal  Behavior is cooperative  Thought Content: Thought content normal          Cognition and Memory: Cognition normal          Judgment: Judgment normal          ED Medications  Medications   neomycin-bacitracin-polymyxin b (NEOSPORIN) ointment 1 large application (has no administration in time range)   acetaminophen (TYLENOL) tablet 650 mg (650 mg Oral Given 4/25/23 1005)       Diagnostic Studies  Results Reviewed     None               CT head without contrast   ED Interpretation by Khoi Jones DO (04/25 1052)   No acute intracranial pathology  Procedures  Procedures      ED Course  ED Course as of 04/25/23 1100   Tue Apr 25, 2023   1032 Patient presenting to ED due to head trauma status post fall  No LOC  No blood thinners  1033 AAOx4  Full neuro exam is normal  ROM is intact  1036 Abrasion present on right leg  Neosporin applied to wound  1037 Tylenol given for headache and neck pain   1037 CT head without contrast shows no acute intracranial pathology   1043 Patient stable and safe for discharge       SBIRT 22yo+    Flowsheet Row Most Recent Value   Initial Alcohol Screen: US AUDIT-C     1  How often do you have a drink containing alcohol? 0 Filed at: 04/25/2023 0936   Audit-C Score 0 Filed at: 04/25/2023 7839          Medical Decision Making  Patient is a 43 yo female with a PMH of chronic migraines presenting status post fall with head trauma  She reports feeling dizzy with a headache immediately after hitting head  She denies any LOC, confusion, visual changes, nausea or vomiting, loss of feeling, weakness, slurred speech, or memory loss since fall  In ED, reports right-sided headache, mild right-sided neck pain, and scrape on right leg  She is not on any blood thinners       On exam, patient is AAOx4, neuro exam is normal, full ROM of neck is intact; no bumps, bleeds, or signs of injury on scalp; no head or neck tenderness, no confusion or apparent loss of memory  Abrasion present on lateral right leg  Ordered CT head to rule-out any bleed  Tylenol given in ED  Neosporin applied topically to leg wound  CT head shows no acute intracranial pathology  No need for further testing  Patient informed and reassured  Patient safe to discharge home  May use Tylenol, ice packs on head, and antibiotic cream on leg as needed  Amount and/or Complexity of Data Reviewed  Radiology: ordered  Risk  OTC drugs  Disposition  Final diagnoses:   Fall with head injury     Time reflects when diagnosis was documented in both MDM as applicable and the Disposition within this note     Time User Action Codes Description Comment    4/25/2023 10:55 AM Karley Orozco Add [C39  XXXA] Fall     4/25/2023 10:56 AM Karley Orozco Modify [U58  XXXA] Fall with head injury       ED Disposition     ED Disposition   Discharge    Condition   Stable    Date/Time   Tue Apr 25, 2023 11:00 AM    Comment   Lyric Howell discharge to home/self care  Follow-up Information    None         Patient's Medications   Discharge Prescriptions    No medications on file     No discharge procedures on file  PDMP Review       Value Time User    PDMP Reviewed  Yes 3/1/2023 11:33 AM Graciela Lima PA-C           ED Provider  Attending physically available and evaluated Lyric Howell I managed the patient along with the ED Attending      Electronically Signed by         Shaan Hurtado DO  04/25/23 1100

## 2023-04-25 NOTE — Clinical Note
Shahla Poole was seen and treated in our emergency department on 4/25/2023  Diagnosis:     Eric York  is off the rest of the shift today, may return to work on return date  She may return on this date: 04/26/2023         If you have any questions or concerns, please don't hesitate to call        Ann Valente, DO    ______________________________           _______________          _______________  Hospital Representative                              Date                                Time

## 2023-04-25 NOTE — Clinical Note
Addy Gallagher was seen and treated in our emergency department on 4/25/2023  Diagnosis:     Armani Brain  is off the rest of the shift today, may return to work on return date  She may return on this date: 04/26/2023         If you have any questions or concerns, please don't hesitate to call        Ada Caceres DO    ______________________________           _______________          _______________  Hospital Representative                              Date                                Time

## 2023-05-07 ENCOUNTER — TELEPHONE (OUTPATIENT)
Dept: BARIATRICS | Facility: CLINIC | Age: 38
End: 2023-05-07

## 2023-05-09 DIAGNOSIS — E66.01 MORBID OBESITY (HCC): Primary | ICD-10-CM

## 2023-05-09 RX ORDER — SEMAGLUTIDE 0.25 MG/.5ML
INJECTION, SOLUTION SUBCUTANEOUS
Qty: 2 ML | OUTPATIENT
Start: 2023-05-09

## 2023-05-09 NOTE — TELEPHONE ENCOUNTER
Prior auth started for patients increase dose    Scott Hives (Key: [id number]) - [id number]  YRLEWK 0 5MG/0 5ML auto-injectors

## 2023-05-09 NOTE — TELEPHONE ENCOUNTER
Patient called back is tolerating the 0 25 mg well  Patient won't need refill until Sunday but has an appt scheduled for this Thursday   She said you can refill it if you would like prior to the appt for the 0 5 mg

## 2023-05-09 NOTE — TELEPHONE ENCOUNTER
Please call patient and see how she has tolerated 0 25mg dose? Is she ready for a fill?  I would like to increase to 0 5mg if she is tolerating well

## 2023-05-17 NOTE — PSYCH
Progress Note  Psychotherapy Provided [facility]: Group Therapy provided today  Goals addressed in session:   1 D: PT  was seen for Group Therapy session  Pts engaged in an activity today to practice non-judgmental present focused awareness  A: Elizabeth appeared to enjoy this very different activity and actively participated in the group discussion although she acknowledged having difficulty remaining judgement - free  P: Pt will be encouraged to continue to attend individual therapy weekly and the Adult ED group on a biweekly basis  Pain Scale and Suicide Risk [facility]: Current Pain Assessment: moderate to severe   On a scale of 0 to 10, the patient rates current pain at 5   Behavioral Health Treatment Plan [facility]: Diagnosis and Treatment Plan explained to patient, patient relates understanding diagnosis and is agreeable to Treatment Plan  Assessment    1   Binge-eating disorder, severe (903 6) (K42 [id number])    Signatures   Electronically signed by : [facility], [facility]; Apr 12 2017  6:42PM EST                       (Author) 97

## 2023-05-18 ENCOUNTER — OFFICE VISIT (OUTPATIENT)
Dept: NEUROLOGY | Facility: CLINIC | Age: 38
End: 2023-05-18

## 2023-05-18 VITALS
BODY MASS INDEX: 61.79 KG/M2 | TEMPERATURE: 97.6 F | WEIGHT: 293 LBS | OXYGEN SATURATION: 100 % | DIASTOLIC BLOOD PRESSURE: 76 MMHG | RESPIRATION RATE: 14 BRPM | HEART RATE: 73 BPM | SYSTOLIC BLOOD PRESSURE: 120 MMHG

## 2023-05-18 DIAGNOSIS — J32.2 CHRONIC ETHMOIDAL SINUSITIS: ICD-10-CM

## 2023-05-18 DIAGNOSIS — G47.33 OSA (OBSTRUCTIVE SLEEP APNEA): ICD-10-CM

## 2023-05-18 DIAGNOSIS — G43.909 MIGRAINE HEADACHE: Primary | ICD-10-CM

## 2023-05-18 DIAGNOSIS — G44.229 CHRONIC TENSION-TYPE HEADACHE, NOT INTRACTABLE: ICD-10-CM

## 2023-05-18 RX ORDER — BUPROPION HYDROCHLORIDE 300 MG/1
300 TABLET ORAL DAILY
COMMUNITY
Start: 2023-03-24

## 2023-05-18 RX ORDER — GABAPENTIN 600 MG/1
TABLET ORAL
Qty: 90 TABLET | Refills: 3 | Status: SHIPPED | OUTPATIENT
Start: 2023-05-18

## 2023-05-18 RX ORDER — LISDEXAMFETAMINE DIMESYLATE 70 MG/1
70 CAPSULE ORAL DAILY
COMMUNITY
Start: 2023-04-27

## 2023-05-18 RX ORDER — TIZANIDINE 2 MG/1
2 TABLET ORAL EVERY 8 HOURS PRN
Qty: 20 TABLET | Refills: 0 | Status: SHIPPED | OUTPATIENT
Start: 2023-05-18

## 2023-05-18 RX ORDER — FLUTICASONE PROPIONATE 50 MCG
2 SPRAY, SUSPENSION (ML) NASAL DAILY
COMMUNITY
Start: 2023-04-16

## 2023-05-18 NOTE — PROGRESS NOTES
Review of Systems   Constitutional: Negative  Negative for appetite change and fever  HENT: Negative  Negative for hearing loss, tinnitus, trouble swallowing and voice change  Eyes: Positive for photophobia  Negative for pain and visual disturbance  Respiratory: Negative  Negative for shortness of breath  Cardiovascular: Negative  Negative for palpitations  Gastrointestinal: Positive for nausea  Negative for vomiting  Endocrine: Negative  Negative for cold intolerance  Genitourinary: Negative  Negative for dysuria, frequency and urgency  Musculoskeletal: Negative  Negative for gait problem, myalgias and neck pain  Skin: Negative  Negative for rash  Allergic/Immunologic: Negative  Neurological: Positive for dizziness, light-headedness and headaches (Lasting a week)  Negative for tremors, seizures, syncope, facial asymmetry, speech difficulty, weakness and numbness  Hematological: Negative  Does not bruise/bleed easily  Psychiatric/Behavioral: Negative  Negative for confusion, hallucinations and sleep disturbance

## 2023-05-18 NOTE — PATIENT INSTRUCTIONS
Migraines:    I had the pleasure of seeing Elston Ormond today in the office at [facility] in [place]  She is presenting for an office visit follow-up in regards to her migraine headaches and chronic tension type headaches at this time  She states that she has had an increased frequency of the headaches over the past couple days to weeks  She notes that the tension headaches at the back of the head that are different from her migraine headaches are much worse in severity  However, the migraine headaches that are less painful can be more detrimental to her life is causing her to feel unproductive and overall not feeling as well for the amount of time that she has a headache  She is currently taking Imitrex as needed for abortive therapy of the headache, she is currently taking a number of preventative medications at this time  -Moving forward, patient should continue with her current regimen of preventative medications such as propanolol, Topamax, Emgality, and Nurtec   -We did slightly adjust the patient's gabapentin today, to see if we can give her any bit more relief in terms of the tension type headaches of the back of the head and may be also helping with the migraines as well  I increased her from gabapentin 600 mg, taking a half a tablet in the morning and taking 1/2 tablets at nighttime to taking 1 tablet in the morning and 2 full tablets in the afternoon  Like to see if this makes any difference to the patient's headaches   -Also, due to the significant amount of tension that the patient feels that the back, neck and head area, did prescribe the patient a muscle relaxant to try on an as-needed basis  Prescribed her tizanidine 2 mg, she can take 1 tablet by mouth as needed every 8 hours for muscle spasm/tightness   -Patient should continue her current dose of the Imitrex, Imitrex 100 mg, take 1 tablet by mouth once at the onset of severe headache    May repeat this dose in 2 hours if needed   -Continue to work on staying hydrated, patient should take in anywhere between 59 to 72 ounces of water per day  Diet he is not the most accurate substitute for water  Patient is allowed to continue to drink tea as long as she is supplementing it with water   -Would look into trying to use her CPAP again, patient does not have chronic a m  headaches however it may help with her migraines in general     Follow-up in about 6 months time with Dr Atul Powers at his earliest convenience  If the patient has any other questions or concerns with the new medication changes today, or if she has any medication/headache questions in general she can always contact the office and get a hold of either myself or Dr Atul Powers

## 2023-05-18 NOTE — PROGRESS NOTES
[facility] [address] [facility]  PATIENT:  Sahara Marrero  MRN:  [id number]  :  1985  DATE OF SERVICE:  2023      Assessment/Plan:     Migraines:    I had the pleasure of seeing Elizabeth today in the office at [facility] [address] [facility] in [place]  She is presenting for an office visit follow-up in regards to her migraine headaches and chronic tension type headaches at this time  She states that she has had an increased frequency of the headaches over the past couple days to weeks  She notes that the tension headaches at the back of the head that are different from her migraine headaches are much worse in severity  However, the migraine headaches that are less painful can be more detrimental to her life is causing her to feel unproductive and overall not feeling as well for the amount of time that she has a headache  She is currently taking Imitrex as needed for abortive therapy of the headache, she is currently taking a number of preventative medications at this time  -Moving forward, patient should continue with her current regimen of preventative medications such as propanolol, Topamax, Emgality, and Nurtec   -We did slightly adjust the patient's gabapentin today, to see if we can give her any bit more relief in terms of the tension type headaches of the back of the head and may be also helping with the migraines as well  I increased her from gabapentin 600 mg, taking a half a tablet in the morning and taking 1/2 tablets at nighttime to taking 1 tablet in the morning and 2 full tablets in the afternoon  Like to see if this makes any difference to the patient's headaches   -Also, due to the significant amount of tension that the patient feels that the back, neck and head area, did prescribe the patient a muscle relaxant to try on an as-needed basis    Prescribed her tizanidine 2 mg, she can take 1 tablet by mouth as needed every 8 hours for muscle spasm/tightness   -Patient should continue her current dose of the Imitrex, Imitrex 100 mg, take 1 tablet by mouth once at the onset of severe headache  May repeat this dose in 2 hours if needed   -Continue to work on staying hydrated, patient should take in anywhere between 64 to 72 ounces of water per day  Diet he is not the most accurate substitute for water  Patient is allowed to continue to drink tea as long as she is supplementing it with water   -Would look into trying to use her CPAP again, patient does not have chronic a m  headaches however it may help with her migraines in general     Follow-up in about 6 months time with Dr Michael Nolen at his earliest convenience  If the patient has any other questions or concerns with the new medication changes today, or if she has any medication/headache questions in general she can always contact the office and get a hold of either myself or Dr Michael Nolen  History of Present Illness: We had the pleasure of evaluating Kelsey Garnica in neurological follow up  today for headaches  As you know,  she is a 44 y o    female with a past medical history significant for migraine headaches, tension type headaches  Patient was last seen in the office by Dr Michael Nolen on 08/22/2022  She was presenting for follow-up in regards to her chronic migraine headaches  At the last appointment, Dr Michael Nolen had concern for potential idiopathic intracranial hypertension  Patient reported at baseline her headaches had not significantly changed except she was having fewer daily headaches than before  She is experiencing a few breakthrough migraine days every month  Migraines would tend to last for solid 3 days at a time  For prevention, Dr Michael Nolen had recommended to continue her extensive regimen of preventative medications including Emgality, gabapentin, Topamax, Nurtec, and Celebrex  Patient had been working with her primary care and other medical teams and was now on propanolol    Dr Michael Nolen wanted her to continue to work with her primary team in terms of titrating the propanolol upwards  Patient had began abortive therapy with Imitrex 100 mg tablets at that time  She was recommended to take 1 at the onset of a headache, she could repeat this in 2 hours if needed  Max of 2 tablets in 24 hours  During that time she could take Benadryl 25 mg at bedtime every night when she was in the midst of a headache cycle  She could also try extra strength Tylenol 2 tablets with the Imitrex at the onset of a severe headache  Current medical illnesses: GERD, hypothyroidism, hyperaldosteronism, recurrent sinusitis, chronic ethmoidal sinusitis, CAIO, migraine, hypertension, idiopathic intracranial hypertension, lumbar degenerative disc disease, IT band syndrome on the left side, seasonal allergies, obsessive-compulsive disorder, binge eating disorder, morbidly obese, cervicalgia      What medications do you take or have you taken for your headaches? Current Preventive:   Propanolol, Topamax, Emgality, Nurtec  Current Abortive:   Imitrex    Prior Preventive:   Magnesium, B2, vitamin D, Claritin, lisinopril, Wellbutrin, Zoloft, haloperidol, lithium, Latuda, Cogentin, Risperdal, Lamictal, gabapentin, Diamox, cyclobenzaprine, tizanidine, indomethacin, Botox, Cefaly device  Prior Abortive: Toradol, ibuprofen, diclofenac, hydrocodone, Ativan    Interval updates as of 5/18/2023:  Headaches feel like her normal migraine headaches, feel as though her headaches were doing much better and they seemingly became worse  Hand seemed to become numb on and off with the headaches  Very dizzy and lightheaded with the headaches, up and moving around more now  [place] as though being on antibiotics for congestion could have contributed to the resolution of sinus pressure and headaches, and topamax seemed to be increased around the same time which may have helped with the headaches   Did not think over a month ago when she fell on her head and also an iron fell on her head  Was sent to sleep medicine by Dr Peña Both, recommended to try CPAP for sleep apnea again but didn't think this was causing her headaches with sleep apnea  Therapy once a week for mental health counseling, psychiatrist once a month  Patient feels as though she is stable with mental health at this time  Drink lots of diet iced tea  What is your current pain level ? No pian just weird feeling at top of head and lightheadedness  How often do the headaches occur?   2-3 times a month of migraine headaches  Start at the back of the neck for tension tyupe headaches  Having more actual migraines now  Are you ever headache free? yes    Headache history with updates:  Headache are worse if the patient: not worse with positional changes are Valsalva  Light will make it worse and different smells     None worse with specific positional change  Headache triggers:  Sinus pressure leading to more headaches, stress seems to make them worse    Aura/warning and how long does it last ? No     What time of the day do the headaches start? Many times in the afternoon     How long do the headaches last?   Sometime Imitrex helps for headaches, and does not really seem to help get rid of it for the migraines  Migraines can last a few days    Describe your usual headache ? Pain at the back of the neck is usually more painful than migraines, At base of the neck and up her head, more throbbing  Migraines make her feel tired and sluggish, all over head and pressure    Where is your headache located? Regular daily headaches are at back of head  All over head with migraines, hard time functioniong  What is the intensity of pain?    Headaches back of head: 8/10  Migraines: 6/10, more affecting her life     Associated symptoms:   - Decrease of appetite, nausea,   - Photophobia, phonophobia, sensitivity to smell   - Problem with concentration  -  light-headed or dizzy (usually with headaches one of last symptoms to go away),   - Hands tingle or feel numb, prefer to be alone and in a dark room, unable to work    Number of days missed per month because of headaches:  Work (or school) days: currently on disability for mental health issues  Social or Family activities: misses other events    What time of the year do headaches occur more frequently? do not seem to be related to any time of the year  Have you seen someone else for headaches or pain? No, always with [facility]  Have you had trigger point injection performed and how often? Yes, nerve block with Dr Dandy Shaw 02/16/2023  Have you had Botox injection performed and how often? No  Have you had epidural injections or transforaminal injections performed? No    Have you ever had any Brain imaging? yes MRI brain and orbits wo and w contrast, 06/26/2023 I personally reviewed these images  Reviewed old notes from physician seen in the past- see above HPI for summary of previous encounters         Past Medical History:   Diagnosis Date   • Allergic    • Allergic rhinitis    • Anorexia nervosa in remission    • Anxiety    • Arthritis 5/8/2014   • Back pain    • Bipolar disorder ([address] )    • Depression    • Disease of thyroid gland    • Diverticulitis of colon 9/20/2015   • Dizziness    • Ear problems    • Eating disorder    • Esophageal reflux 08/15/2013   • GERD (gastroesophageal reflux disease) 8/15/2013   • Headache(784 0)    • Headache, tension-type    • Hypertension    • Hypothyroid    • Idiopathic intracranial hypertension    • Lumbar degenerative disc disease 05/08/2014   • Migraine    • Nasal congestion    • Nosebleed    • Obesity    • Obsessive-compulsive disorder    • Otitis media    • Overactive bladder    • Psychiatric disorder    • Restless leg syndrome, controlled    • Seasonal allergies    • Sinusitis    • Sleep apnea    • Sleep difficulties    • Suicide and self-inflicted injury ([address] )    • Tinnitus    • TMJ dysfunction • Tonsillitis    • Transcranial Magnetic Stimulation 09/06/2021   • Urinary tract infection    • Vision loss        Patient Active Problem List   Diagnosis   • Obsessive-compulsive disorder   • Hypertension   • Hypothyroidism   • Overactive bladder   • Allergic rhinitis   • Binge-eating disorder, severe   • Gambling disorder, moderate   • Routine screening for STI (sexually transmitted infection)   • BMI 50 0-59 9, adult ([facility])   • Dysfunction of both eustachian tubes   • Bulging lumbar disc   • Esophageal reflux   • EILEEN (generalized anxiety disorder)   • Lumbar degenerative disc disease   • Morbid obesity ([facility])   • Nocturnal enuresis   • PTSD (post-traumatic stress disorder)   • Urgency incontinence   • Vitamin D deficiency   • Chronic midline low back pain without sciatica   • Iliotibial band syndrome of left side   • Piriformis syndrome of left side   • Skin lesion   • Recurrent sinusitis   • IFG (impaired fasting glucose)   • Family history of cancer   • Intracranial hypertension   • Chronic migraine without aura without status migrainosus, not intractable   • Chronic tension-type headache, not intractable   • Menorrhagia with irregular cycle   • Encounter for insertion of mirena IUD   • CAIO (obstructive sleep apnea)   • IUD check up   • Class 3 severe obesity with body mass index (BMI) of 60 0 to 69 9 in adult [facility])   • Cervicalgia   • Numbness and tingling in both hands   • Dermatitis   • Chronic constipation   • Thyroid disease neuropathy ([facility] [address] )   • Encounter for medication monitoring   • Hypervolemia   • Migraine headache   • Hyperaldosteronism ([facility])   • Persistent proteinuria   • Chronic ethmoidal sinusitis   • Chronic maxillary sinusitis       Medications:      Current Outpatient Medications   Medication Sig Dispense Refill   • buPROPion (WELLBUTRIN XL) 300 mg 24 hr tablet Take 300 mg by mouth daily     • celecoxib (CeleBREX) 200 mg capsule TAKE 1 CAPSULE BY MOUTH TWICE A DAY 60 capsule 4   • Cequa 0 09 % SOLN instill 1 drop into both eyes twice a day     • Cholecalciferol (VITAMIN D3) 5000 units CAPS Take 5,000 Units by mouth daily     • clomiPRAMINE (ANAFRANIL) 50 mg capsule Take by mouth TAKE 2 CAPSULES IN AM AND 3 CAPSULES IN PM     • Emgality 120 MG/ML SOAJ INJECT 120 MG UNDER THE SKIN EVERY 30 DAYS  3 mL 1   • fluticasone (FLONASE) 50 mcg/act nasal spray 2 sprays daily     • folic acid (FOLVITE) 1 mg tablet      • furosemide (LASIX) 40 mg tablet TAKE 1/2 TABLET BY MOUTH DAILY OR 1 AND 1/2 TABS IF WEIGHT GAIN OF 3 POUNDS IN ONE  tablet 2   • gabapentin (NEURONTIN) 600 MG tablet TAKE 1/2 TABLET DAILY IN AM AND 1 AND 1/2 TABLET AT BEDTIME     • levothyroxine 125 mcg tablet TAKE 1 TABLET BY MOUTH EVERY DAY IN THE MORNING 90 tablet 1   • Linzess 290 MCG CAPS TAKE 1 CAPSULE BY MOUTH EVERY DAY 30 capsule 5   • loratadine (CLARITIN) 10 mg tablet Take 10 mg by mouth daily     • LORazepam (ATIVAN) 0 5 mg tablet      • loteprednol etabonate (LOTEMAX) 0 5 % ophthalmic suspension Administer 1 drop to both eyes 2 (two) times a day     • metFORMIN (GLUCOPHAGE) 500 mg tablet Take 1 tablet (500 mg total) by mouth daily with breakfast 90 tablet 0   • Multiple Vitamin (MULTIVITAMIN) capsule Take 1 capsule by mouth daily     • omeprazole (PriLOSEC) 20 mg delayed release capsule Take 1 capsule (20 mg total) by mouth 2 (two) times a day 180 capsule 1   • PARoxetine (PAXIL) 30 mg tablet Take 30 mg by mouth daily     • propranolol (INDERAL) 20 mg tablet Take 20 mg by mouth every 12 (twelve) hours     • Rimegepant Sulfate (Nurtec) 75 MG TBDP Take 75 mg by mouth every other day    If there is a migraine on a non-Nurtec day, can take a dose and skip the following day 16 tablet 2   • Semaglutide-Weight Management (WEGOVY) 0 5 MG/0 5ML Inject 0 5 mL (0 5 mg total) under the skin once a week for 4 doses 2 mL 0   • spironolactone (ALDACTONE) 50 mg tablet TAKE 1 TABLET BY MOUTH EVERY DAY 90 tablet 1   • SUMAtriptan (IMITREX) 100 mg tablet 1 tab as needed for migraine, can repeat once in 2 hours, max 200mg in 24 hours  12 tablet 3   • topiramate (TOPAMAX) 100 mg tablet Take 1 5 tablets (150 mg total) by mouth 2 (two) times a day 270 tablet 1   • Vraylar 3 MG capsule Take 3 mg by mouth daily Daily in the evening     • Vraylar 6 MG capsule Take 6 mg by mouth daily Daily in the morning     • Vyvanse 70 MG capsule Take 70 mg by mouth daily     • dexamethasone (DECADRON) 4 mg tablet 1 tab daily X 3 days, then 1/2 tab daily X 4 more days (Patient not taking: Reported on 3/16/2023) 5 tablet 0   • HYDROcodone-acetaminophen (Norco) 5-325 mg per tablet Take 1 tablet by mouth every 6 (six) hours as needed for pain Initial Rx Max Daily Amount: 4 tablets (Patient not taking: Reported on 3/16/2023) 6 tablet 0   • hydrocortisone-acetic acid (VOSOL-HC) otic solution Administer 3 drops into both ears every 6 (six) hours (Patient not taking: Reported on 1/5/2023) 10 mL 0   • mupirocin (BACTROBAN) 2 % ointment Apply topically 3 (three) times a day (Patient not taking: Reported on 3/16/2023) 30 g 0   • sertraline (ZOLOFT) 100 mg tablet Take 100 mg by mouth daily  (Patient not taking: Reported on 3/9/2023)  0   • Xiidra 5 % op solution  (Patient not taking: Reported on 3/9/2023)       Current Facility-Administered Medications   Medication Dose Route Frequency Provider Last Rate Last Admin   • Botulinum Toxin Type A SOLR 200 Units  200 Units Injection Q3 Months Arlet Doyle PA-C   200 Units at 04/01/21 1453   • Botulinum Toxin Type A SOLR 200 Units  200 Units Injection Q3 Months Michaela Marrero MD   200 Units at 07/28/21 1211        Allergies:       Allergies   Allergen Reactions   • Doxycycline      Pt refuses d/t remote H/O intracranial hypertension    • Other      Seasonal allergies        Family History:     Family History   Problem Relation Age of Onset   • Mental illness Mother    • Depression Mother    • Suicide Attempts Mother    • Hypertension Mother    • Diabetes Mother    • Other Mother         bicuspid aortic valve   • Anxiety disorder Mother    • Psychiatric Illness Mother    • Schizoaffective Disorder  Mother    • Hypertension Father    • Hypothyroidism Father    • Thyroid disease Father    • Hypertension Brother    • Cancer Maternal Grandmother    • Heart disease Maternal Grandmother    • Stroke Maternal Grandmother    • Breast cancer Maternal Grandmother    • Skin cancer Maternal Grandmother    • Arthritis Maternal Grandmother    • Pneumonia Maternal Grandfather    • Cancer Maternal Grandfather    • Dementia Maternal Grandfather    • COPD Maternal Grandfather    • Cancer Paternal Grandmother    • Pneumonia Paternal Grandfather    • Arthritis Family    • Breast cancer Family    • Osteopenia Family    • Osteoporosis Family    • Hypertension Family    • Transient ischemic attack Family        Social History:     Social History     Socioeconomic History   • Marital status: Single     Spouse name: Not on file   • Number of children: Not on file   • Years of education: Not on file   • Highest education level: Not on file   Occupational History   • Occupation: DISABLED   Tobacco Use   • Smoking status: Never   • Smokeless tobacco: Never   Vaping Use   • Vaping Use: Never used   Substance and Sexual Activity   • Alcohol use: Never   • Drug use: Never   • Sexual activity: Yes     Partners: Female     Comment: No STDs   Other Topics Concern   • Not on file   Social History Narrative    Always uses seat belts    Caffeine use     Disabled - permament disability since 2008 due to psychiatric illness    Has no children    Single     Tea     Social Determinants of Health     Financial Resource Strain: Medium Risk   • Difficulty of Paying Living Expenses: Somewhat hard   Food Insecurity: Not on file   Transportation Needs: No Transportation Needs   • Lack of Transportation (Medical): No   • Lack of Transportation (Non-Medical):  No   Physical Activity: Not on file Stress: Not on file   Social Connections: Not on file   Intimate Partner Violence: Not on file   Housing Stability: Not on file         Objective:     Physical Exam:                                                                 Vitals:            Constitutional:    /76 (BP Location: Right arm, Patient Position: Sitting, Cuff Size: Large)   Pulse 73   Temp 97 6 °F (36 4 °C) (Temporal)   Resp 14   Wt (!) 179 kg (394 lb 8 oz)   SpO2 100%   BMI 61 79 kg/m²   BP Readings from Last 3 Encounters:   05/25/23 110/78   05/18/23 120/76   04/25/23 138/76     Pulse Readings from Last 3 Encounters:   05/25/23 64   05/18/23 73   04/25/23 67         Well developed, well nourished, well groomed  No dysmorphic features  Psychiatric:  Normal behavior and appropriate affect        Neurological Examination:     Mental status/cognitive function:   Orientated to time, place and person  Recent and remote memory intact  Attention span and concentration as well as fund of knowledge are appropriate for age  Normal language and spontaneous speech  Cranial Nerves:  II-visual fields full  III, IV, VI-Pupils were equal, round, and reactive to light and accomodation  Extraocular movements were full and conjugate without nystagmus  Conjugate gaze, normal smooth pursuits, normal saccades   V-facial sensation symmetric  VII-facial expression symmetric, intact forehead wrinkle, strong eye closure, symmetric smile    VIII-hearing grossly intact bilaterally   IX, X-palate elevation symmetric, no dysarthria  XI-shoulder shrug strength intact    XII-tongue protrusion midline  Motor Exam: symmetric bulk and tone throughout, no pronator drift  Power/strength 5/5 bilateral upper and lower extremities, no atrophy, fasciculations or abnormal movements noted  Sensory: grossly intact light touch in all extremities     Reflexes: brachioradialis 2+, biceps 2+, knee 2+ bilaterally  Coordination: Finger nose finger intact bilaterally, no apparent dysmetria, ataxia or tremor noted  Gait: steady casual and tandem gait  Review of Systems:     Review of Systems   Constitutional: Negative  Negative for appetite change and fever  HENT: Negative  Negative for hearing loss, tinnitus, trouble swallowing and voice change  Eyes: Positive for photophobia  Negative for pain and visual disturbance  Respiratory: Negative  Negative for shortness of breath  Cardiovascular: Negative  Negative for palpitations  Gastrointestinal: Positive for nausea  Negative for vomiting  Endocrine: Negative  Negative for cold intolerance  Genitourinary: Negative  Negative for dysuria, frequency and urgency  Musculoskeletal: Negative  Negative for gait problem, myalgias and neck pain  Skin: Negative  Negative for rash  Allergic/Immunologic: Negative  Neurological: Positive for dizziness, light-headedness and headaches (Lasting a week)  Negative for tremors, seizures, syncope, facial asymmetry, speech difficulty, weakness and numbness  Hematological: Negative  Does not bruise/bleed easily  Psychiatric/Behavioral: Negative  Negative for confusion, hallucinations and sleep disturbance  I personally reviewed the ROS entered by the MA    I spent 60 minutes in total time for this visit      Author:  Jaci Garza PA-C 5/18/2023 1:43 PM

## 2023-05-25 ENCOUNTER — OFFICE VISIT (OUTPATIENT)
Dept: NEPHROLOGY | Facility: CLINIC | Age: 38
End: 2023-05-25

## 2023-05-25 VITALS
BODY MASS INDEX: 45.99 KG/M2 | DIASTOLIC BLOOD PRESSURE: 78 MMHG | HEART RATE: 64 BPM | OXYGEN SATURATION: 99 % | SYSTOLIC BLOOD PRESSURE: 110 MMHG | WEIGHT: 293 LBS | HEIGHT: 67 IN

## 2023-05-25 DIAGNOSIS — I10 PRIMARY HYPERTENSION: Chronic | ICD-10-CM

## 2023-05-25 DIAGNOSIS — R60.0 BILATERAL LOWER EXTREMITY EDEMA: ICD-10-CM

## 2023-05-25 DIAGNOSIS — G44.89 HEADACHE ASSOCIATED WITH HORMONAL FACTORS: Primary | ICD-10-CM

## 2023-05-25 DIAGNOSIS — R80.1 PERSISTENT PROTEINURIA: ICD-10-CM

## 2023-05-25 DIAGNOSIS — E26.9 HYPERALDOSTERONISM (HCC): ICD-10-CM

## 2023-05-25 DIAGNOSIS — G93.2 INTRACRANIAL HYPERTENSION: ICD-10-CM

## 2023-05-25 NOTE — PATIENT INSTRUCTIONS
I'm going to try to contact and endocrinologist and find out how to refer you to the [facility]     Lab work in 4 months    Urines at your earliest convenience

## 2023-05-25 NOTE — PROGRESS NOTES
Nephrology   Office Follow-Up  Maegan Velasco 44 y o  female MRN: [id number]    Encounter: [id number]        [address] was seen in the [place] office today  All diagnoses and orders for visit:     1  Headache associated with hormonal factors  · will check 24-hour urine sodium to get an idea about what her daily salt intake is like  Patient is frustrated with her polypharmacy  She does follow with [facility]  She continues to have intermittent headaches associated with swelling and then polyuria  Unclear if there is a hormonal component  Could consider endocrinology consult  Will refer to [facility] for second opinion  Previously on Diamox for intracranial hypertension however did not tolerate this well and prefers to use Lasix  Could also consider methazolamide   -     Sodium, urine, 24 hour; Future   -     Creatinine, urine, 24 hour; Future   -     Comprehensive metabolic panel; Future; Expected date: 08/07/2023   -     CBC and differential; Future; Expected date: 08/07/2023   -     Albumin / creatinine urine ratio; Future; Expected date: 08/07/2023   -     Urinalysis with microscopic; Future; Expected date: 08/07/2023   -     Ambulatory Referral to Neurology; Future  2  Bilateral lower extremity edema  3  Hyperaldosteronism (HCC)  · Continue spironolactone  Hold on aldosterone testing at this point as this medication will to be held for 4 to 6 weeks prior to testing  4  Primary hypertension  · Pressures appropriate  Patient can tolerate a goal bp 120/80 mmHg        HPI: Maegan Velasco is a 44 y o  female who here for follow-up regarding persistent proteinuria, hypertension, hyperaldosteronism, bilateral lower extremity edema, intracranial hypertension    As reminder, she did not tolerate Diamox due to dry mouth  She manages her symptoms with Lasix  Her IUD was removed to reduce hormonal contributions      She tells me she continues to have intermittent severe headaches that leave her debilitated  She notes polyuria during headache which resulted in 20 pound weight loss  He continues on Lasix  She admits to frustration with her polypharmacy    will refer her to [facility]  We will also like to start hormonal work-up  Order 24-hour urine sodium and aldosterone  Consider methazolamide for intracranial hypertension      ROS:   Review of Systems   Constitutional: Negative for chills and fever  Feeling fluids retention fluctuating and then lays down and then urinates 20 pounds of fluid     HENT: Negative for ear pain and sore throat  Eyes: Negative for pain and visual disturbance  Respiratory: Negative for cough and shortness of breath  Cardiovascular: Positive for leg swelling  Negative for chest pain and palpitations  Gastrointestinal: Negative for abdominal pain and vomiting  Genitourinary: Negative for dysuria and hematuria  Musculoskeletal: Negative for arthralgias and back pain  Skin: Negative for color change and rash  Neurological: Negative for seizures and syncope  All other systems reviewed and are negative        Allergies: Doxycycline and Other    Medications:   Current Outpatient Medications:   •  buPROPion (WELLBUTRIN XL) 300 mg 24 hr tablet, Take 300 mg by mouth daily, Disp: , Rfl:   •  celecoxib (CeleBREX) 200 mg capsule, TAKE 1 CAPSULE BY MOUTH TWICE A DAY, Disp: 60 capsule, Rfl: 4  •  Cequa 0 09 % SOLN, instill 1 drop into both eyes twice a day, Disp: , Rfl:   •  Cholecalciferol (VITAMIN D3) 5000 units CAPS, Take 5,000 Units by mouth daily, Disp: , Rfl:   •  clomiPRAMINE (ANAFRANIL) 50 mg capsule, Take by mouth TAKE 2 CAPSULES IN AM AND 3 CAPSULES IN PM, Disp: , Rfl:   •  Emgality 120 MG/ML SOAJ, INJECT 120 MG UNDER THE SKIN EVERY 30 DAYS , Disp: 3 mL, Rfl: 1  •  fluticasone (FLONASE) 50 mcg/act nasal spray, 2 sprays daily, Disp: , Rfl:   •  folic acid (FOLVITE) 1 mg tablet, , Disp: , Rfl:   •  furosemide (LASIX) 40 mg tablet, TAKE 1/2 TABLET BY MOUTH DAILY OR 1 AND 1/2 TABS IF WEIGHT GAIN OF 3 POUNDS IN ONE DAY, Disp: 135 tablet, Rfl: 2  •  gabapentin (Neurontin) 600 MG tablet, Take 1 tablet by mouth in the morning, and take 2 tablets by mouth at bedtime  , Disp: 90 tablet, Rfl: 3  •  levothyroxine 125 mcg tablet, TAKE 1 TABLET BY MOUTH EVERY DAY IN THE MORNING, Disp: 90 tablet, Rfl: 1  •  Linzess 290 MCG CAPS, TAKE 1 CAPSULE BY MOUTH EVERY DAY, Disp: 30 capsule, Rfl: 5  •  loratadine (CLARITIN) 10 mg tablet, Take 10 mg by mouth daily, Disp: , Rfl:   •  LORazepam (ATIVAN) 0 5 mg tablet, , Disp: , Rfl:   •  loteprednol etabonate (LOTEMAX) 0 5 % ophthalmic suspension, Administer 1 drop to both eyes 2 (two) times a day, Disp: , Rfl:   •  metFORMIN (GLUCOPHAGE) 500 mg tablet, Take 1 tablet (500 mg total) by mouth daily with breakfast, Disp: 90 tablet, Rfl: 0  •  Multiple Vitamin (MULTIVITAMIN) capsule, Take 1 capsule by mouth daily, Disp: , Rfl:   •  omeprazole (PriLOSEC) 20 mg delayed release capsule, Take 1 capsule (20 mg total) by mouth 2 (two) times a day, Disp: 180 capsule, Rfl: 1  •  PARoxetine (PAXIL) 30 mg tablet, Take 30 mg by mouth daily, Disp: , Rfl:   •  propranolol (INDERAL) 20 mg tablet, Take 20 mg by mouth every 12 (twelve) hours, Disp: , Rfl:   •  Rimegepant Sulfate (Nurtec) 75 MG TBDP, Take 75 mg by mouth every other day   If there is a migraine on a non-Nurtec day, can take a dose and skip the following day, Disp: 16 tablet, Rfl: 2  •  Semaglutide-Weight Management (WEGOVY) 0 5 MG/0 5ML, Inject 0 5 mL (0 5 mg total) under the skin once a week for 4 doses, Disp: 2 mL, Rfl: 0  •  spironolactone (ALDACTONE) 50 mg tablet, TAKE 1 TABLET BY MOUTH EVERY DAY, Disp: 90 tablet, Rfl: 1  •  SUMAtriptan (IMITREX) 100 mg tablet, 1 tab as needed for migraine, can repeat once in 2 hours, max 200mg in 24 hours  , Disp: 12 tablet, Rfl: 3  •  tiZANidine (ZANAFLEX) 2 mg tablet, Take 1 tablet (2 mg total) by mouth every 8 (eight) hours as needed for muscle spasms, Disp: 20 tablet, Rfl: 0  •  topiramate (TOPAMAX) 100 mg tablet, Take 1 5 tablets (150 mg total) by mouth 2 (two) times a day, Disp: 270 tablet, Rfl: 1  •  Vraylar 3 MG capsule, Take 3 mg by mouth daily Daily in the evening, Disp: , Rfl:   •  Vraylar 6 MG capsule, Take 6 mg by mouth daily Daily in the morning, Disp: , Rfl:   •  Vyvanse 70 MG capsule, Take 70 mg by mouth daily, Disp: , Rfl:     Current Facility-Administered Medications:   •  Botulinum Toxin Type A SOLR 200 Units, 200 Units, Injection, Q3 Months, Arlet Doyle PA-C, 200 Units at 04/01/21 1453  •  Botulinum Toxin Type A SOLR 200 Units, 200 Units, Injection, Q3 Months, Ruth Sales MD, 200 Units at 07/28/21 1211    Past Medical History:   Diagnosis Date   • Allergic    • Allergic rhinitis    • Anorexia nervosa in remission    • Anxiety    • Arthritis 5/8/2014   • Back pain    • Bipolar disorder (HCC)    • Depression    • Disease of thyroid gland    • Diverticulitis of colon 9/20/2015   • Dizziness    • Ear problems    • Eating disorder    • Esophageal reflux 08/15/2013   • GERD (gastroesophageal reflux disease) 8/15/2013   • Headache(784 0)    • Headache, tension-type    • Hypertension    • Hypothyroid    • Idiopathic intracranial hypertension    • Lumbar degenerative disc disease 05/08/2014   • Migraine    • Nasal congestion    • Nosebleed    • Obesity    • Obsessive-compulsive disorder    • Otitis media    • Overactive bladder    • Psychiatric disorder    • Restless leg syndrome, controlled    • Seasonal allergies    • Sinusitis    • Sleep apnea    • Sleep difficulties    • Suicide and self-inflicted injury ([facility] [address] )    • Tinnitus    • TMJ dysfunction    • Tonsillitis    • Transcranial Magnetic Stimulation 09/06/2021   • Urinary tract infection    • Vision loss      Past Surgical History:   Procedure Laterality Date   • DENTAL SURGERY      wisdom teeth-at [age] years   Other "surgery dental extraxtion of bone spur (10 years ago)   • IR LUMBAR PUNCTURE  02/26/2019   • SINUS ENDOSCOPY     • SINUS SURGERY     • TONSILLECTOMY       Family History   Problem Relation Age of Onset   • Mental illness Mother    • Depression Mother    • Suicide Attempts Mother    • Hypertension Mother    • Diabetes Mother    • Other Mother         bicuspid aortic valve   • Anxiety disorder Mother    • Psychiatric Illness Mother    • Schizoaffective Disorder  Mother    • Hypertension Father    • Hypothyroidism Father    • Thyroid disease Father    • Hypertension Brother    • Cancer Maternal Grandmother    • Heart disease Maternal Grandmother    • Stroke Maternal Grandmother    • Breast cancer Maternal Grandmother    • Skin cancer Maternal Grandmother    • Arthritis Maternal Grandmother    • Pneumonia Maternal Grandfather    • Cancer Maternal Grandfather    • Dementia Maternal Grandfather    • COPD Maternal Grandfather    • Cancer Paternal Grandmother    • Pneumonia Paternal Grandfather    • Arthritis Family    • Breast cancer Family    • Osteopenia Family    • Osteoporosis Family    • Hypertension Family    • Transient ischemic attack Family       reports that she has never smoked  She has never used smokeless tobacco  She reports that she does not drink alcohol and does not use drugs  Physical Exam:   Vitals:    05/25/23 1612 05/25/23 1616   BP: 112/72 110/78   BP Location: Left arm Right arm   Patient Position: Sitting Sitting   Cuff Size: Large Large   Pulse: 64    SpO2: 99%    Weight: (!) 180 kg (396 lb)    Height: 5' 7\" (1 702 m)      Body mass index is 62 02 kg/m²      General: conscious, cooperative, in no acute distress, appears stated age  Eyes: conjunctivae pale, anicteric sclerae  ENT: lips and mucous membranes moist  Neck: supple, no JVD, no masses  Chest:  essentially clear breath sounds bilaterally, no crackles, ronchus or wheezings  CVS: S1 & S2, normal rate, regular rhythm  Abdomen: soft, " "non-tender, non-distended, normoactive bowel sounds, rounded obese  Extremities: no edema of both legs  Skin: no rash   Neuro: awake, alert, oriented       Diagnostic Data:  Lab: I have personally reviewed pertinent lab results  ,   CBC:       CMP: No results found for: \"ALKPHOS\", \"ALT\", \"ANIONGAP\", \"AST\", \"BILITOT\", \"BUN\", \"CALCIUM\", \"CL\", \"CO2\", \"CREATININE\", \"EGFR\", \"GLUCOSE\", \"K\", \"PROT\", \"SODIUM\",   PT/INR: No results found for: \"INR\", \"PT\",   Magnesium: No components found for: \"MAG\",  Phosphorous: No results found for: \"PHOS\"    Patient Instructions   I'm going to try to contact and endocrinologist and find out how to refer you to the [address] [place] [address]     Lab work in 4 months    Urines at your earliest convenience       Portions of the record may have been created with voice recognition software  Occasional wrong word or \"sound a like\" substitutions may have occurred due to the inherent limitations of voice recognition software  Read the chart carefully and recognize, using context, where substitutions have occurred  If you have any questions, please contact the dictating provider    "

## 2023-05-30 ENCOUNTER — TELEPHONE (OUTPATIENT)
Dept: NEPHROLOGY | Facility: CLINIC | Age: 38
End: 2023-05-30

## 2023-05-30 NOTE — TELEPHONE ENCOUNTER
Patient returned call  She is aware    that the 24 hour urine aldosterone testing has been d/c    I did not realize she was on spironolactone and this medication will need to be held 4-6 weeks prior to doing a urine study for the level  She should do the 24 hour urine sodium test that I have ordered  I will further discuss case with my colleagues and let her know any further recommendations    She is aware a referral for second opinion with [facility]- recommend she request the [facility]  She will  try to get her appt with one of the docs there like Vivi Covarrubias [phone]    She will contact us if she has any issues getting through to office

## 2023-06-05 ENCOUNTER — TELEPHONE (OUTPATIENT)
Dept: BARIATRICS | Facility: CLINIC | Age: 38
End: 2023-06-05

## 2023-06-05 DIAGNOSIS — K21.9 GASTROESOPHAGEAL REFLUX DISEASE WITHOUT ESOPHAGITIS: ICD-10-CM

## 2023-06-05 DIAGNOSIS — G43.709 CHRONIC MIGRAINE WITHOUT AURA WITHOUT STATUS MIGRAINOSUS, NOT INTRACTABLE: ICD-10-CM

## 2023-06-05 RX ORDER — CELECOXIB 200 MG/1
CAPSULE ORAL
Qty: 60 CAPSULE | Refills: 4 | Status: SHIPPED | OUTPATIENT
Start: 2023-06-05

## 2023-06-05 RX ORDER — CELECOXIB 200 MG/1
200 CAPSULE ORAL 2 TIMES DAILY
Qty: 60 CAPSULE | Refills: 0 | Status: CANCELLED | OUTPATIENT
Start: 2023-06-05

## 2023-06-06 DIAGNOSIS — E66.01 MORBID OBESITY (HCC): Primary | ICD-10-CM

## 2023-06-06 RX ORDER — OMEPRAZOLE 20 MG/1
20 CAPSULE, DELAYED RELEASE ORAL 2 TIMES DAILY
Qty: 180 CAPSULE | Refills: 0 | Status: SHIPPED | OUTPATIENT
Start: 2023-06-06

## 2023-06-06 NOTE — TELEPHONE ENCOUNTER
Please inform patient of shortage of Wegovy currently  Would she be willing to switch to [address] [place] [address] which is a daily injection?

## 2023-06-06 NOTE — TELEPHONE ENCOUNTER
Spoke to patient - she is willing to switch to [place] due to shortage  [facility] [place]    Can you send her the dosing schedule and info to her myChart?

## 2023-06-13 ENCOUNTER — TELEPHONE (OUTPATIENT)
Dept: BARIATRICS | Facility: CLINIC | Age: 38
End: 2023-06-13

## 2023-06-14 NOTE — TELEPHONE ENCOUNTER
Prior auth for Saxenda denied due to still showing patient was taking Wegovy in the past   Called and was able to update info that patient will now only be on [address]      They marked it as urgent and we will have a determination within 24 hours    Ref# [id number]

## 2023-06-18 DIAGNOSIS — R73.01 IFG (IMPAIRED FASTING GLUCOSE): ICD-10-CM

## 2023-06-18 DIAGNOSIS — R63.5 ABNORMAL WEIGHT GAIN: ICD-10-CM

## 2023-06-18 DIAGNOSIS — K59.09 CHRONIC CONSTIPATION: ICD-10-CM

## 2023-06-18 DIAGNOSIS — E66.01 MORBID (SEVERE) OBESITY DUE TO EXCESS CALORIES (HCC): ICD-10-CM

## 2023-06-18 DIAGNOSIS — F50.81 BINGE-EATING DISORDER, SEVERE: ICD-10-CM

## 2023-06-20 RX ORDER — LINACLOTIDE 290 UG/1
290 CAPSULE, GELATIN COATED ORAL DAILY
Qty: 90 CAPSULE | Refills: 3 | Status: SHIPPED | OUTPATIENT
Start: 2023-06-20 | End: 2023-09-18

## 2023-06-21 ENCOUNTER — TELEPHONE (OUTPATIENT)
Dept: OTOLARYNGOLOGY | Facility: CLINIC | Age: 38
End: 2023-06-21

## 2023-06-21 NOTE — TELEPHONE ENCOUNTER
Patient called wants to know if there is a way to have her ent and sleep appt alined, they are on same day but hrs apart, please call her back, ty  appts are tomorrow 6/22

## 2023-06-22 ENCOUNTER — TELEPHONE (OUTPATIENT)
Dept: NEPHROLOGY | Facility: CLINIC | Age: 38
End: 2023-06-22

## 2023-06-22 NOTE — TELEPHONE ENCOUNTER
Patient called stating she is doing the 24 hour urine for sodium  Patient stated she got her period today is it still ok for her to do the test or should she wait?

## 2023-06-23 NOTE — PSYCH
Psychotherapy Provided: Group Therapy     Length of time in session: 60 minutes, follow up in 1 week    Goals addressed in session: Goal 1     Pain:      none    0    Current suicide risk : Low     1 D:  The above was seen for Group Therapy session  Group members engaged in a discussion re: struggles with anxiety and how it might relate to their other diagnoses, coping mechanisms and struggles  A: Elizabeth actively participated in today's discussion  She  verbalized that she  identified closely with another group member who shared about her own struggles  P : She will be encouraged to continue to attend individual therapy weekly and the Trauma group on a biweekly basis  Behavioral Health Treatment Plan [facility]: Diagnosis and Treatment Plan explained to Kelechi Tavarez relates understanding diagnosis and is agreeable to Treatment Plan   Yes Valtrex Counseling: I discussed with the patient the risks of valacyclovir including but not limited to kidney damage, nausea, vomiting and severe allergy.  The patient understands that if the infection seems to be worsening or is not improving, they are to call.

## 2023-06-27 DIAGNOSIS — G43.709 CHRONIC MIGRAINE WITHOUT AURA WITHOUT STATUS MIGRAINOSUS, NOT INTRACTABLE: ICD-10-CM

## 2023-06-28 RX ORDER — GALCANEZUMAB 120 MG/ML
120 INJECTION, SOLUTION SUBCUTANEOUS
Qty: 3 ML | Refills: 3 | Status: SHIPPED | OUTPATIENT
Start: 2023-06-28

## 2023-07-05 DIAGNOSIS — R80.1 PERSISTENT PROTEINURIA: Primary | ICD-10-CM

## 2023-07-05 NOTE — PROGRESS NOTES
Pt called, states she began 24 hr urine test and started her period so she was unable to finish it.  Pt went back to lab to get new jug for urine and she was told we had to reorder it

## 2023-07-09 DIAGNOSIS — G43.709 CHRONIC MIGRAINE WITHOUT AURA WITHOUT STATUS MIGRAINOSUS, NOT INTRACTABLE: ICD-10-CM

## 2023-07-12 DIAGNOSIS — G43.709 CHRONIC MIGRAINE WITHOUT AURA WITHOUT STATUS MIGRAINOSUS, NOT INTRACTABLE: ICD-10-CM

## 2023-07-12 RX ORDER — RIMEGEPANT SULFATE 75 MG/75MG
TABLET, ORALLY DISINTEGRATING ORAL
Qty: 16 TABLET | Refills: 2 | Status: SHIPPED | OUTPATIENT
Start: 2023-07-12

## 2023-07-13 RX ORDER — RIMEGEPANT SULFATE 75 MG/75MG
75 TABLET, ORALLY DISINTEGRATING ORAL EVERY OTHER DAY
Qty: 16 TABLET | Refills: 11 | Status: CANCELLED | OUTPATIENT
Start: 2023-07-13

## 2023-07-13 NOTE — TELEPHONE ENCOUNTER
[facility] Approved 1-20-22. No end date specified. Peggy, Please sign ended script if in agreement. Thank you!

## 2023-07-14 ENCOUNTER — APPOINTMENT (OUTPATIENT)
Dept: LAB | Facility: CLINIC | Age: 38
End: 2023-07-14
Payer: COMMERCIAL

## 2023-07-14 DIAGNOSIS — R80.1 PERSISTENT PROTEINURIA: ICD-10-CM

## 2023-07-14 DIAGNOSIS — G44.89 HEADACHE ASSOCIATED WITH HORMONAL FACTORS: ICD-10-CM

## 2023-07-14 LAB
CREAT 24H UR-MRATE: 1.1 G/24HR (ref 0.6–1.8)
PERIOD: 24 HOURS
SODIUM 24H UR-SRATE: 108.9 MMOL/24 HRS (ref 40–220)
SPECIMEN VOL UR: 1100 ML
SPECIMEN VOL UR: 1100 ML

## 2023-07-14 PROCEDURE — 82570 ASSAY OF URINE CREATININE: CPT

## 2023-07-14 PROCEDURE — 84300 ASSAY OF URINE SODIUM: CPT

## 2023-07-18 ENCOUNTER — TELEPHONE (OUTPATIENT)
Dept: NEPHROLOGY | Facility: CLINIC | Age: 38
End: 2023-07-18

## 2023-07-18 NOTE — TELEPHONE ENCOUNTER
Received voicemail that pt wanted a call back regarding [facility].  Left voicemail for pt to call back

## 2023-07-20 ENCOUNTER — OFFICE VISIT (OUTPATIENT)
Dept: BARIATRICS | Facility: CLINIC | Age: 38
End: 2023-07-20

## 2023-07-20 VITALS
RESPIRATION RATE: 20 BRPM | DIASTOLIC BLOOD PRESSURE: 78 MMHG | HEIGHT: 67 IN | WEIGHT: 293 LBS | HEART RATE: 96 BPM | BODY MASS INDEX: 45.99 KG/M2 | SYSTOLIC BLOOD PRESSURE: 130 MMHG | TEMPERATURE: 98 F | OXYGEN SATURATION: 98 %

## 2023-07-20 DIAGNOSIS — F50.81 BINGE-EATING DISORDER, SEVERE: ICD-10-CM

## 2023-07-20 DIAGNOSIS — E66.01 MORBID OBESITY (HCC): Primary | ICD-10-CM

## 2023-07-20 DIAGNOSIS — R73.01 IFG (IMPAIRED FASTING GLUCOSE): ICD-10-CM

## 2023-07-20 NOTE — PROGRESS NOTES
Assessment/Plan: Morbid obesity ([address])  -Patient is pursuing Conservative Program  -Initial weight loss goal of 5-10% weight loss for improved health  -Screening labs: reviewed, up to date  -dietary/lifestyle changes, continue to work with [facility]  -On Topamax and Wellbutrin already   -avoid Phentermine  -On Saxenda but does not like the daily injection. Was on Ozempic in past and tolerated well but was taken off due to shortage. Will try to get her back on this and can possibly switch to [facility] depending on how she tolerates. Has hx chronic constipation but admits not keeping up with her bowel regimen or fluids. SHe expressed understanding to be consistent with this  -dietary recall reviewed. Patient struggling emotionally and has not  been focusing as much on her nutrition.   -She willwork on increasing water to goal  -medication agreement signed    Initial: 421 lbs  Last OV: 403 lbs  Current: 389.4 lbs  Change: -31.6 lbs  Goal: wants to feel healthy    IFG (impaired fasting glucose)  -On Metformin 500mg daily  -Will switch back to Ozempic. D/C Saxenda  -HgbA1c improved to 5.6  - Denies personal hx of pancreatitis or family hx of MEN2/MTC    Binge-eating disorder, severe  -followed by psychiatry  -hx Binge eating - on Vyvanse    Goals:    Food log (ie.) www.[employer] Edgepal.com,bop.fm. [employer].com,[employer]. com,etc.   No sugary beverages. At least 64oz of water daily. Increase physical activity by 10 minutes daily. Gradually increase physical activity to a goal of 5 days per week for 30 minutes of MODERATE intensity PLUS 2 days per week of FULL BODY resistance training  Increase water to 64oz  5-10 servings of fruits and vegetables per day      Follow up in approximately 3 months with Non-Surgical Physician/Advanced Practitioner. Diagnoses and all orders for this visit:    Morbid obesity ([address])  -     semaglutide, 0.25 or 0.5 mg/dose, (Ozempic, 0.25 or 0.5 MG/DOSE,) 2 mg/3 mL injection pen;  Inject 0.25mg subcutaneously once weekly for 4 weeks and then increase to 0.5mg once weekly    IFG (impaired fasting glucose)  -     semaglutide, 0.25 or 0.5 mg/dose, (Ozempic, 0.25 or 0.5 MG/DOSE,) 2 mg/3 mL injection pen; Inject 0.25mg subcutaneously once weekly for 4 weeks and then increase to 0.5mg once weekly    Binge-eating disorder, severe          Subjective:   Chief Complaint   Patient presents with   • Follow-up        Patient ID: Maddie Modi  is a 44 y.o. female with excess weight/obesity here to pursue weight managment. Patient is pursuing Conservative Program.     HPI Patient presents for [facility] follow up. On Saxenda and tolerating well but does not like to do the daily injection. Reports does not eat on a strucuture. Has not been feeling well due to her daily HA. Will be seeing specialist in [address] [place] [address] in therapy weekly. Reports her depression has been worse due to struggling with daily headaches    Hydration: diet iced tea, 1 bottle of water, coffee + flavored creamer  Sleep: not wearing CPAP due to getting new puppy.  Encouraged her to start wearing again    Will be weaning off of Anafranil and Wellbutrin under psychiatry care    B: Toast or english muffin with butter and jelly OR Life cereal + 2% milk  S: skips  L: skips or picks something up when out  S:   D: cereal - varies daily        Wt Readings from Last 10 Encounters:   07/20/23 (!) 177 kg (389 lb 6.4 oz)   05/25/23 (!) 180 kg (396 lb)   05/18/23 (!) 179 kg (394 lb 8 oz)   04/25/23 (!) 183 kg (404 lb 1.7 oz)   03/22/23 (!) 182 kg (401 lb)   03/16/23 (!) 183 kg (403 lb 12.8 oz)   03/09/23 (!) 183 kg (402 lb 12.8 oz)   01/30/23 (!) 180 kg (396 lb)   01/27/23 (!) 180 kg (397 lb)   01/26/23 (!) 180 kg (397 lb)        The following portions of the patient's history were reviewed and updated as appropriate: allergies, current medications, past family history, past medical history, past social history, past surgical history and problem list.    Review of Systems   Cardiovascular: Negative. Gastrointestinal: Positive for constipation. Negative for abdominal pain, nausea and vomiting. Psychiatric/Behavioral: Positive for dysphoric mood. Negative for suicidal ideas. Objective:    /78 (BP Location: Right arm, Patient Position: Sitting, Cuff Size: Large)   Pulse 96   Temp 98 °F (36.7 °C)   Resp 20   Ht 5' 7" (1.702 m)   Wt (!) 177 kg (389 lb 6.4 oz)   SpO2 98%   BMI 60.99 kg/m²      Physical Exam  Vitals and nursing note reviewed. Constitutional   General appearance: Abnormal.  well developed and morbidly obese. Eyes No conjunctival pallor. Ears, Nose, Mouth, and Throat Oral mucosa moist.   Pulmonary   Respiratory effort: No increased work of breathing or signs of respiratory distress. Auscultation of lungs: Clear to auscultation, equal breath sounds bilaterally, no wheezes, no rales, no rhonci. Cardiovascular   Auscultation of heart: Normal rate and rhythm, normal S1 and S2, without murmurs. Examination of extremities for edema and/or varicosities: Normal.  no edema. Abdomen   Abdomen: Abnormal.  The abdomen was obese. Bowel sounds were normal. The abdomen was soft and nontender.    Musculoskeletal   Gait and station: Normal.    Psychiatric   Orientation to person, place and time: Normal.    Affect: appropriate

## 2023-07-20 NOTE — ASSESSMENT & PLAN NOTE
-On Metformin 500mg daily  -Will switch back to Ozempic.  D/C Saxenda  -HgbA1c improved to 5.6  - Denies personal hx of pancreatitis or family hx of MEN2/MTC

## 2023-07-20 NOTE — PATIENT INSTRUCTIONS
Goals: Food log (ie.) www.[employer].com,"[employer]. [employer].com,[employer]. com,etc.   No sugary beverages. At least 64oz of water daily. Increase physical activity by 10 minutes daily.  Gradually increase physical activity to a goal of 5 days per week for 30 minutes of MODERATE intensity PLUS 2 days per week of FULL BODY resistance training  Increase water to 64oz  5-10 servings of fruits and vegetables per day

## 2023-07-20 NOTE — ASSESSMENT & PLAN NOTE
-Patient is pursuing Conservative Program  -Initial weight loss goal of 5-10% weight loss for improved health  -Screening labs: reviewed, up to date  -dietary/lifestyle changes, continue to work with [address] [place] [address] Blvd  -On Topamax and Wellbutrin already   -avoid Phentermine  -On Saxenda but does not like the daily injection. Was on Ozempic in past and tolerated well but was taken off due to shortage. Will try to get her back on this and can possibly switch to [facility] depending on how she tolerates. Has hx chronic constipation but admits not keeping up with her bowel regimen or fluids. SHe expressed understanding to be consistent with this  -dietary recall reviewed.  Patient struggling emotionally and has not  been focusing as much on her nutrition.   -She willwork on increasing water to goal  -medication agreement signed    Initial: 421 lbs  Last OV: 403 lbs  Current: 389.4 lbs  Change: -31.6 lbs  Goal: wants to feel healthy

## 2023-07-27 ENCOUNTER — TELEPHONE (OUTPATIENT)
Dept: FAMILY MEDICINE CLINIC | Facility: CLINIC | Age: 38
End: 2023-07-27

## 2023-08-02 ENCOUNTER — HOSPITAL ENCOUNTER (EMERGENCY)
Facility: HOSPITAL | Age: 38
End: 2023-08-03
Attending: EMERGENCY MEDICINE
Payer: COMMERCIAL

## 2023-08-02 DIAGNOSIS — R45.851 SUICIDAL IDEATION: ICD-10-CM

## 2023-08-02 DIAGNOSIS — F32.A DEPRESSION: ICD-10-CM

## 2023-08-02 DIAGNOSIS — E26.9 HYPERALDOSTERONISM (HCC): ICD-10-CM

## 2023-08-02 DIAGNOSIS — R51.9 HEADACHE: Primary | ICD-10-CM

## 2023-08-02 LAB
AMPHETAMINES SERPL QL SCN: POSITIVE
ANION GAP SERPL CALCULATED.3IONS-SCNC: 8 MMOL/L
BARBITURATES UR QL: NEGATIVE
BASOPHILS # BLD AUTO: 0.08 THOUSANDS/ÂΜL (ref 0–0.1)
BASOPHILS NFR BLD AUTO: 1 % (ref 0–1)
BENZODIAZ UR QL: NEGATIVE
BILIRUB UR QL STRIP: NEGATIVE
BUN SERPL-MCNC: 23 MG/DL (ref 5–25)
CALCIUM SERPL-MCNC: 9.1 MG/DL (ref 8.4–10.2)
CHLORIDE SERPL-SCNC: 104 MMOL/L (ref 96–108)
CLARITY UR: CLEAR
CO2 SERPL-SCNC: 26 MMOL/L (ref 21–32)
COCAINE UR QL: NEGATIVE
COLOR UR: YELLOW
CREAT SERPL-MCNC: 0.85 MG/DL (ref 0.6–1.3)
EOSINOPHIL # BLD AUTO: 0.32 THOUSAND/ÂΜL (ref 0–0.61)
EOSINOPHIL NFR BLD AUTO: 3 % (ref 0–6)
ERYTHROCYTE [DISTWIDTH] IN BLOOD BY AUTOMATED COUNT: 13.4 % (ref 11.6–15.1)
EXT PREGNANCY TEST URINE: NEGATIVE
EXT. CONTROL: NORMAL
GFR SERPL CREATININE-BSD FRML MDRD: 87 ML/MIN/1.73SQ M
GLUCOSE SERPL-MCNC: 89 MG/DL (ref 65–140)
GLUCOSE UR STRIP-MCNC: NEGATIVE MG/DL
HCT VFR BLD AUTO: 46.9 % (ref 34.8–46.1)
HGB BLD-MCNC: 14.9 G/DL (ref 11.5–15.4)
HGB UR QL STRIP.AUTO: NEGATIVE
IMM GRANULOCYTES # BLD AUTO: 0.04 THOUSAND/UL (ref 0–0.2)
IMM GRANULOCYTES NFR BLD AUTO: 0 % (ref 0–2)
KETONES UR STRIP-MCNC: NEGATIVE MG/DL
LEUKOCYTE ESTERASE UR QL STRIP: NEGATIVE
LYMPHOCYTES # BLD AUTO: 2.85 THOUSANDS/ÂΜL (ref 0.6–4.47)
LYMPHOCYTES NFR BLD AUTO: 26 % (ref 14–44)
MAGNESIUM SERPL-MCNC: 2 MG/DL (ref 1.9–2.7)
MCH RBC QN AUTO: 29.3 PG (ref 26.8–34.3)
MCHC RBC AUTO-ENTMCNC: 31.8 G/DL (ref 31.4–37.4)
MCV RBC AUTO: 92 FL (ref 82–98)
METHADONE UR QL: NEGATIVE
MONOCYTES # BLD AUTO: 0.66 THOUSAND/ÂΜL (ref 0.17–1.22)
MONOCYTES NFR BLD AUTO: 6 % (ref 4–12)
NEUTROPHILS # BLD AUTO: 7.13 THOUSANDS/ÂΜL (ref 1.85–7.62)
NEUTS SEG NFR BLD AUTO: 64 % (ref 43–75)
NITRITE UR QL STRIP: NEGATIVE
NRBC BLD AUTO-RTO: 0 /100 WBCS
OPIATES UR QL SCN: NEGATIVE
OXYCODONE+OXYMORPHONE UR QL SCN: NEGATIVE
PCP UR QL: NEGATIVE
PH UR STRIP.AUTO: 6 [PH]
PLATELET # BLD AUTO: 321 THOUSANDS/UL (ref 149–390)
PMV BLD AUTO: 9.4 FL (ref 8.9–12.7)
POTASSIUM SERPL-SCNC: 3.8 MMOL/L (ref 3.5–5.3)
PROT UR STRIP-MCNC: NEGATIVE MG/DL
RBC # BLD AUTO: 5.08 MILLION/UL (ref 3.81–5.12)
SODIUM SERPL-SCNC: 138 MMOL/L (ref 135–147)
SP GR UR STRIP.AUTO: 1.02
THC UR QL: NEGATIVE
TSH SERPL DL<=0.05 MIU/L-ACNC: 1.67 UIU/ML (ref 0.45–4.5)
UROBILINOGEN UR QL STRIP.AUTO: 0.2 E.U./DL
WBC # BLD AUTO: 11.08 THOUSAND/UL (ref 4.31–10.16)

## 2023-08-02 PROCEDURE — 36415 COLL VENOUS BLD VENIPUNCTURE: CPT | Performed by: EMERGENCY MEDICINE

## 2023-08-02 PROCEDURE — 81003 URINALYSIS AUTO W/O SCOPE: CPT | Performed by: EMERGENCY MEDICINE

## 2023-08-02 PROCEDURE — 84443 ASSAY THYROID STIM HORMONE: CPT | Performed by: EMERGENCY MEDICINE

## 2023-08-02 PROCEDURE — 80048 BASIC METABOLIC PNL TOTAL CA: CPT | Performed by: EMERGENCY MEDICINE

## 2023-08-02 PROCEDURE — 80307 DRUG TEST PRSMV CHEM ANLYZR: CPT | Performed by: EMERGENCY MEDICINE

## 2023-08-02 PROCEDURE — 81025 URINE PREGNANCY TEST: CPT | Performed by: EMERGENCY MEDICINE

## 2023-08-02 PROCEDURE — 99285 EMERGENCY DEPT VISIT HI MDM: CPT | Performed by: EMERGENCY MEDICINE

## 2023-08-02 PROCEDURE — 83735 ASSAY OF MAGNESIUM: CPT | Performed by: EMERGENCY MEDICINE

## 2023-08-02 PROCEDURE — 85025 COMPLETE CBC W/AUTO DIFF WBC: CPT | Performed by: EMERGENCY MEDICINE

## 2023-08-02 RX ORDER — BUPROPION HYDROCHLORIDE 150 MG/1
300 TABLET ORAL DAILY
Status: CANCELLED | OUTPATIENT
Start: 2023-08-03

## 2023-08-02 RX ORDER — ACETAMINOPHEN 325 MG/1
650 TABLET ORAL EVERY 6 HOURS PRN
Status: CANCELLED | OUTPATIENT
Start: 2023-08-02

## 2023-08-02 RX ORDER — BENZTROPINE MESYLATE 0.5 MG/1
1 TABLET ORAL
Status: CANCELLED | OUTPATIENT
Start: 2023-08-02

## 2023-08-02 RX ORDER — HALOPERIDOL 0.5 MG/1
1 TABLET ORAL EVERY 6 HOURS PRN
Status: CANCELLED | OUTPATIENT
Start: 2023-08-02

## 2023-08-02 RX ORDER — BENZTROPINE MESYLATE 1 MG/ML
1 INJECTION INTRAMUSCULAR; INTRAVENOUS
Status: CANCELLED | OUTPATIENT
Start: 2023-08-02

## 2023-08-02 RX ORDER — LORAZEPAM 2 MG/ML
2 INJECTION INTRAMUSCULAR
Status: CANCELLED | OUTPATIENT
Start: 2023-08-02

## 2023-08-02 RX ORDER — LANOLIN ALCOHOL/MO/W.PET/CERES
3 CREAM (GRAM) TOPICAL
Status: CANCELLED | OUTPATIENT
Start: 2023-08-02

## 2023-08-02 RX ORDER — HYDROXYZINE 50 MG/1
25 TABLET, FILM COATED ORAL
Status: CANCELLED | OUTPATIENT
Start: 2023-08-02

## 2023-08-02 RX ORDER — BENZTROPINE MESYLATE 1 MG/ML
0.5 INJECTION INTRAMUSCULAR; INTRAVENOUS
Status: CANCELLED | OUTPATIENT
Start: 2023-08-02

## 2023-08-02 RX ORDER — MAGNESIUM HYDROXIDE/ALUMINUM HYDROXICE/SIMETHICONE 120; 1200; 1200 MG/30ML; MG/30ML; MG/30ML
30 SUSPENSION ORAL EVERY 4 HOURS PRN
Status: CANCELLED | OUTPATIENT
Start: 2023-08-02

## 2023-08-02 RX ORDER — KETOROLAC TROMETHAMINE 30 MG/ML
15 INJECTION, SOLUTION INTRAMUSCULAR; INTRAVENOUS ONCE
Status: COMPLETED | OUTPATIENT
Start: 2023-08-02 | End: 2023-08-02

## 2023-08-02 RX ORDER — DIPHENHYDRAMINE HYDROCHLORIDE 50 MG/ML
50 INJECTION INTRAMUSCULAR; INTRAVENOUS EVERY 6 HOURS PRN
Status: CANCELLED | OUTPATIENT
Start: 2023-08-02

## 2023-08-02 RX ORDER — HYDROXYZINE 50 MG/1
100 TABLET, FILM COATED ORAL
Status: CANCELLED | OUTPATIENT
Start: 2023-08-02

## 2023-08-02 RX ORDER — GABAPENTIN 600 MG/1
600 TABLET ORAL 2 TIMES DAILY
Status: CANCELLED | OUTPATIENT
Start: 2023-08-02

## 2023-08-02 RX ORDER — BISACODYL 10 MG
10 SUPPOSITORY, RECTAL RECTAL DAILY PRN
Status: CANCELLED | OUTPATIENT
Start: 2023-08-02

## 2023-08-02 RX ORDER — HALOPERIDOL 5 MG/ML
2.5 INJECTION INTRAMUSCULAR
Status: CANCELLED | OUTPATIENT
Start: 2023-08-02

## 2023-08-02 RX ORDER — AMOXICILLIN 250 MG
1 CAPSULE ORAL DAILY PRN
Status: CANCELLED | OUTPATIENT
Start: 2023-08-02

## 2023-08-02 RX ORDER — LORATADINE 10 MG/1
10 TABLET ORAL DAILY
Status: CANCELLED | OUTPATIENT
Start: 2023-08-03

## 2023-08-02 RX ORDER — DIPHENHYDRAMINE HYDROCHLORIDE 50 MG/ML
25 INJECTION INTRAMUSCULAR; INTRAVENOUS ONCE
Status: COMPLETED | OUTPATIENT
Start: 2023-08-02 | End: 2023-08-02

## 2023-08-02 RX ORDER — SPIRONOLACTONE 25 MG/1
50 TABLET ORAL DAILY
Status: CANCELLED | OUTPATIENT
Start: 2023-08-03

## 2023-08-02 RX ORDER — TRAZODONE HYDROCHLORIDE 50 MG/1
50 TABLET ORAL
Status: CANCELLED | OUTPATIENT
Start: 2023-08-02

## 2023-08-02 RX ORDER — DIPHENHYDRAMINE HYDROCHLORIDE 50 MG/ML
25 INJECTION INTRAMUSCULAR; INTRAVENOUS ONCE
Status: DISCONTINUED | OUTPATIENT
Start: 2023-08-02 | End: 2023-08-02

## 2023-08-02 RX ORDER — ACETAMINOPHEN 325 MG/1
650 TABLET ORAL EVERY 4 HOURS PRN
Status: CANCELLED | OUTPATIENT
Start: 2023-08-02

## 2023-08-02 RX ORDER — CELECOXIB 100 MG/1
200 CAPSULE ORAL 2 TIMES DAILY
Status: CANCELLED | OUTPATIENT
Start: 2023-08-02

## 2023-08-02 RX ORDER — POLYETHYLENE GLYCOL 3350 17 G/17G
17 POWDER, FOR SOLUTION ORAL DAILY PRN
Status: CANCELLED | OUTPATIENT
Start: 2023-08-02

## 2023-08-02 RX ORDER — PROPRANOLOL HYDROCHLORIDE 20 MG/1
20 TABLET ORAL EVERY 12 HOURS SCHEDULED
Status: CANCELLED | OUTPATIENT
Start: 2023-08-02

## 2023-08-02 RX ORDER — METOCLOPRAMIDE HYDROCHLORIDE 5 MG/ML
10 INJECTION INTRAMUSCULAR; INTRAVENOUS ONCE
Status: DISCONTINUED | OUTPATIENT
Start: 2023-08-02 | End: 2023-08-02

## 2023-08-02 RX ORDER — HALOPERIDOL 5 MG/1
5 TABLET ORAL
Status: CANCELLED | OUTPATIENT
Start: 2023-08-02

## 2023-08-02 RX ORDER — LORAZEPAM 2 MG/ML
2 INJECTION INTRAMUSCULAR EVERY 6 HOURS PRN
Status: CANCELLED | OUTPATIENT
Start: 2023-08-02

## 2023-08-02 RX ORDER — METOCLOPRAMIDE HYDROCHLORIDE 5 MG/ML
10 INJECTION INTRAMUSCULAR; INTRAVENOUS ONCE
Status: COMPLETED | OUTPATIENT
Start: 2023-08-02 | End: 2023-08-02

## 2023-08-02 RX ORDER — MAGNESIUM SULFATE HEPTAHYDRATE 40 MG/ML
2 INJECTION, SOLUTION INTRAVENOUS ONCE
Status: COMPLETED | OUTPATIENT
Start: 2023-08-02 | End: 2023-08-02

## 2023-08-02 RX ORDER — FLUTICASONE PROPIONATE 50 MCG
2 SPRAY, SUSPENSION (ML) NASAL DAILY
Status: CANCELLED | OUTPATIENT
Start: 2023-08-03

## 2023-08-02 RX ORDER — HALOPERIDOL 5 MG/ML
5 INJECTION INTRAMUSCULAR
Status: CANCELLED | OUTPATIENT
Start: 2023-08-02

## 2023-08-02 RX ORDER — LORAZEPAM 2 MG/ML
1 INJECTION INTRAMUSCULAR
Status: CANCELLED | OUTPATIENT
Start: 2023-08-02

## 2023-08-02 RX ORDER — ACETAMINOPHEN 325 MG/1
975 TABLET ORAL EVERY 6 HOURS PRN
Status: CANCELLED | OUTPATIENT
Start: 2023-08-02

## 2023-08-02 RX ORDER — KETOROLAC TROMETHAMINE 30 MG/ML
15 INJECTION, SOLUTION INTRAMUSCULAR; INTRAVENOUS ONCE
Status: DISCONTINUED | OUTPATIENT
Start: 2023-08-02 | End: 2023-08-02

## 2023-08-02 RX ORDER — HYDROXYZINE 50 MG/1
50 TABLET, FILM COATED ORAL
Status: CANCELLED | OUTPATIENT
Start: 2023-08-02

## 2023-08-02 RX ADMIN — MAGNESIUM SULFATE HEPTAHYDRATE 2 G: 40 INJECTION, SOLUTION INTRAVENOUS at 17:17

## 2023-08-02 RX ADMIN — METOCLOPRAMIDE 10 MG: 5 INJECTION, SOLUTION INTRAMUSCULAR; INTRAVENOUS at 15:35

## 2023-08-02 RX ADMIN — KETOROLAC TROMETHAMINE 15 MG: 30 INJECTION, SOLUTION INTRAMUSCULAR; INTRAVENOUS at 15:35

## 2023-08-02 RX ADMIN — DIPHENHYDRAMINE HYDROCHLORIDE 25 MG: 50 INJECTION, SOLUTION INTRAMUSCULAR; INTRAVENOUS at 15:35

## 2023-08-02 NOTE — LETTER
Section I - General Information    Name of Patient: Sonal Stephens                 : 1985    Medicare #:____________________  Transport Date: 23 (PCS is valid for round trips on this date and for all repetitive trips in the 60-day range as noted below.)  Origin: [id number]                                                         Destination:________________________________________________  Is the pt's stay covered under Medicare Part A (PPS/DRG)     (_) YES  (_) NO  Closest appropriate facility?  (_) YES  (_) NO  If no, why is transport to more distant facility required?________________________  If hosp-hosp transfer, describe services needed at 2nd facility not available at 1st facility? _________________________________  If hospice pt, is this transport related to pt's terminal illness? (_) YES (_) NO Describe____________________________________    Section II - Medical Necessity Questionnaire  Ambulance transportation is medically necessary only if other means of transport are contraindicated or would be potentially harmful to the patient. To meet this requirement, the patient must either be "bed confined" or suffer from a condition such that transport by means other than ambulance is contraindicated by the patient's condition.  The following questions must be answered by the medical professional signing below for this form to be valid:    1)  Describe the MEDICAL CONDITION (physical and/or mental) of this patient  [address] that requires the patient to be transported in an ambulance and why transport by other means is contraindicated by the patient's condition:__________________________________________________________________________________________________    2) Is the patient "bed confined" as defined below?     (_) YES  (_) NO  To be "be confined" the patient must satisfy all three of the following conditions: (1) unable to get up from bed without Assistance; AND (2) unable to ambulate; AND (3) unable to sit in a chair or wheelchair. 3) Can this patient safely be transported by car or wheelchair Yeimi Purl (i.e., seated during transport without a medical attendant or monitoring)?   (_) YES  (_) NO    4) In addition to completing questions 1-3 above, please check any of the following conditions that apply*:  *Note: supporting documentation for any boxes checked must be maintained in the patient's medical records  (_)Contractures   (_)Non-Healed Fractures  (_)Patient is confused (_)Patient is comatose (_)Moderate/severe pain on movement (_)Danger to self/others  (_)IV meds/fluids required (_)Patient is combative(_)Need or possible need for restraints (_)DVT requires elevation of lower extremity  (_)Medical attendant required (_)Requires oxygen-unable to self administer (_)Special handling/isolation/infection control precautions required (_)Unable to tolerate seated position for time needed to transport (_)Hemodynamic monitoring required en route (_)Unable to sit in a chair or wheelchair due to decubitus ulcers or other wounds (_)Cardiac monitoring required en route (_)Morbid obesity requires additional personnel/equipment to safely handle patient (_)Orthopedic device (backboard, halo, pins, traction, brace, wedge, etc,) requiring special handling during transport (_)Other(specify)_______________________________________________    Section III - Signature of Physician or Healthcare Professional    I certify that the above information is accurate based on my evaluation of this patient, and that the medical necessity provisions of 42 .40(e)(1) are met, requiring that this patient be transported by ambulance. I understand this information will be used by the Centers for Medicare and Medicaid Services (CMS) to support the determination of medical necessity for ambulance services.  I represent that I am the beneficiary’s attending physician; or an employee of the beneficiary’s attending physician, or the hospital or facility where the beneficiary is being treated and from which the beneficiary is being transported; that I have personal knowledge of the beneficiary’s condition at the time of transport; and that I meet all Medicare regulations and applicable State licensure laws for the credential indicated. (_) If this box is checked, I also certify that the patient is physically or mentally incapable of signing the ambulance service's claim and that the institution with which I am affiliated has furnished care, services, or assistance to the patient. My signature below is made on behalf of the patient pursuant to 42 CFR §424.36(b)(4). In accordance with 42 CFR §424.37, the specific reason(s) that the patient is physically or mentally incapable of signing the claim form is as follows: _________________________________________________________________________________________________________      Signature of Physician* or Healthcare Professional______________________________________________________________  Signature Date 08/03/23 (For scheduled repetitive transports, this form is not valid for transports performed more than 60 days after this date)    Printed Name & Credentials of Physician or Healthcare Professional (MD, DO, RN, etc.)________________________________  *Form must be signed by patient's attending physician for scheduled, repetitive transports.  For non-repetitive, unscheduled ambulance transports, if unable to obtain the signature of the attending physician, any of the following may sign (choose appropriate option below)  (_) Physician Assistant   (_)  Clinical Nurse Specialist    (_)  Licensed Practical Nurse    (_)    (_)  Nurse Practitioner     (_)  Registered Nurse                (_)                         (_) Discharge Planner

## 2023-08-02 NOTE — ED NOTES
CIS prepared a 201 and obtained signatures from patient and attending. Patient was read her rights and she appeared to be in understanding.

## 2023-08-02 NOTE — ED NOTES
This writer introduced self and role to the patient and her fiance, and completed the safety risk assessment and crisis intake. Per patient, requested that fiance be present. Patient presents to the ED with her fiance via private vehicle. Patient came here due to severe headaches and to be psychiatrically evaluated. Patient was medically cleared before intake. Patient reports increasing depression. Patient has diagnoses of Major Depressive Disorder, ADHD, OCD, and history of anorexia in 2006. Patient reports her mental health issues started in 2006 when her and her friend went on a diet. Patient reports that this led to her having anorexia. Patient was in an inpatient facility to be treated for this. Patient has a history with  facilities, psychiatry, and therapy. Patient currently has services at [facility] seeing Dr. Saulo Dias for psychiatry and Jean Mcdonnell for therapy. Patient is on disability due to severe headaches and mental health issues. Patient reports a safe environment and relationship. Patient has experienced 'bad relationships' in her past which she feels added to her mental health symtpoms. Patient has experienced a lot of loss in the last few years, finding her father decreased in thier home in 2021, then losing step father one month later. Patient's mother declined after this and she is in assisted living. Patient has a hard time accepting this. Patient takes several medications for all of her MH diagnoses but feels she takes too much medication. Patient has no history with substance abuse. Patient denies [address]. Patient is experiencing suicidal ideations, without plan or intent. Patient admits to having suicidal ideations for a while now, having them as fleeting thoughts. Patient denies HI. Patient has a history of SIB via cutting. Patient reports the last time she cut was a month ago on her hand. Patient reports that she is oversleeping due to depression and headaches.  Patient reports no change to appetite. Patient is on weight loss medications. Patient is seeking an inpatient level of care at this time.

## 2023-08-03 ENCOUNTER — HOSPITAL ENCOUNTER (INPATIENT)
Facility: HOSPITAL | Age: 38
LOS: 4 days | Discharge: HOME | DRG: 881 | End: 2023-08-07
Attending: HOSPITALIST | Admitting: PSYCHIATRY & NEUROLOGY
Payer: COMMERCIAL

## 2023-08-03 VITALS
TEMPERATURE: 97 F | BODY MASS INDEX: 45.99 KG/M2 | SYSTOLIC BLOOD PRESSURE: 108 MMHG | RESPIRATION RATE: 18 BRPM | WEIGHT: 293 LBS | OXYGEN SATURATION: 96 % | HEIGHT: 67 IN | HEART RATE: 75 BPM | DIASTOLIC BLOOD PRESSURE: 57 MMHG

## 2023-08-03 DIAGNOSIS — J30.9 ALLERGIC RHINITIS, UNSPECIFIED SEASONALITY, UNSPECIFIED TRIGGER: ICD-10-CM

## 2023-08-03 DIAGNOSIS — G62.9 NEUROPATHY: ICD-10-CM

## 2023-08-03 DIAGNOSIS — E26.9 HYPERALDOSTERONISM (HCC): ICD-10-CM

## 2023-08-03 DIAGNOSIS — F32.A DEPRESSION: ICD-10-CM

## 2023-08-03 DIAGNOSIS — K21.9 GASTROESOPHAGEAL REFLUX DISEASE WITHOUT ESOPHAGITIS: ICD-10-CM

## 2023-08-03 DIAGNOSIS — G43.719 INTRACTABLE CHRONIC MIGRAINE WITHOUT AURA AND WITHOUT STATUS MIGRAINOSUS: ICD-10-CM

## 2023-08-03 DIAGNOSIS — K59.00 CONSTIPATION: ICD-10-CM

## 2023-08-03 DIAGNOSIS — G43.709 CHRONIC MIGRAINE WITHOUT AURA WITHOUT STATUS MIGRAINOSUS, NOT INTRACTABLE: ICD-10-CM

## 2023-08-03 DIAGNOSIS — G47.00 INSOMNIA: ICD-10-CM

## 2023-08-03 DIAGNOSIS — E66.01 MORBID OBESITY (HCC): ICD-10-CM

## 2023-08-03 DIAGNOSIS — K59.09 CHRONIC CONSTIPATION: ICD-10-CM

## 2023-08-03 DIAGNOSIS — E66.01 MORBID (SEVERE) OBESITY DUE TO EXCESS CALORIES (HCC): ICD-10-CM

## 2023-08-03 DIAGNOSIS — F50.81 BINGE-EATING DISORDER, SEVERE: ICD-10-CM

## 2023-08-03 DIAGNOSIS — R63.5 ABNORMAL WEIGHT GAIN: ICD-10-CM

## 2023-08-03 DIAGNOSIS — H04.123 DRY EYES: Primary | ICD-10-CM

## 2023-08-03 DIAGNOSIS — R73.01 IFG (IMPAIRED FASTING GLUCOSE): ICD-10-CM

## 2023-08-03 DIAGNOSIS — E03.9 ACQUIRED HYPOTHYROIDISM: ICD-10-CM

## 2023-08-03 DIAGNOSIS — F41.1 GAD (GENERALIZED ANXIETY DISORDER): ICD-10-CM

## 2023-08-03 DIAGNOSIS — I10 HYPERTENSION, UNSPECIFIED TYPE: ICD-10-CM

## 2023-08-03 RX ORDER — BENZTROPINE MESYLATE 1 MG/ML
0.5 INJECTION INTRAMUSCULAR; INTRAVENOUS
Status: DISCONTINUED | OUTPATIENT
Start: 2023-08-03 | End: 2023-08-07 | Stop reason: HOSPADM

## 2023-08-03 RX ORDER — POLYETHYLENE GLYCOL 3350 17 G/17G
17 POWDER, FOR SOLUTION ORAL DAILY PRN
Status: DISCONTINUED | OUTPATIENT
Start: 2023-08-03 | End: 2023-08-07 | Stop reason: HOSPADM

## 2023-08-03 RX ORDER — LUBIPROSTONE 8 UG/1
8 CAPSULE ORAL 2 TIMES DAILY
Status: DISCONTINUED | OUTPATIENT
Start: 2023-08-03 | End: 2023-08-07 | Stop reason: HOSPADM

## 2023-08-03 RX ORDER — LANOLIN ALCOHOL/MO/W.PET/CERES
3 CREAM (GRAM) TOPICAL
Status: DISCONTINUED | OUTPATIENT
Start: 2023-08-03 | End: 2023-08-07 | Stop reason: HOSPADM

## 2023-08-03 RX ORDER — FLUTICASONE PROPIONATE 50 MCG
2 SPRAY, SUSPENSION (ML) NASAL DAILY
Status: DISCONTINUED | OUTPATIENT
Start: 2023-08-03 | End: 2023-08-04

## 2023-08-03 RX ORDER — PROPRANOLOL HYDROCHLORIDE 20 MG/1
20 TABLET ORAL EVERY 12 HOURS SCHEDULED
Status: DISCONTINUED | OUTPATIENT
Start: 2023-08-03 | End: 2023-08-07 | Stop reason: HOSPADM

## 2023-08-03 RX ORDER — HALOPERIDOL 5 MG/ML
5 INJECTION INTRAMUSCULAR
Status: DISCONTINUED | OUTPATIENT
Start: 2023-08-03 | End: 2023-08-07 | Stop reason: HOSPADM

## 2023-08-03 RX ORDER — AMOXICILLIN 250 MG
1 CAPSULE ORAL DAILY PRN
Status: DISCONTINUED | OUTPATIENT
Start: 2023-08-03 | End: 2023-08-07 | Stop reason: HOSPADM

## 2023-08-03 RX ORDER — CELECOXIB 100 MG/1
200 CAPSULE ORAL 2 TIMES DAILY
Status: DISCONTINUED | OUTPATIENT
Start: 2023-08-03 | End: 2023-08-07 | Stop reason: HOSPADM

## 2023-08-03 RX ORDER — PANTOPRAZOLE SODIUM 40 MG/1
40 TABLET, DELAYED RELEASE ORAL
Status: DISCONTINUED | OUTPATIENT
Start: 2023-08-04 | End: 2023-08-07 | Stop reason: HOSPADM

## 2023-08-03 RX ORDER — ACETAMINOPHEN 325 MG/1
975 TABLET ORAL EVERY 6 HOURS PRN
Status: DISCONTINUED | OUTPATIENT
Start: 2023-08-03 | End: 2023-08-07 | Stop reason: HOSPADM

## 2023-08-03 RX ORDER — HYDROXYZINE 50 MG/1
50 TABLET, FILM COATED ORAL
Status: DISCONTINUED | OUTPATIENT
Start: 2023-08-03 | End: 2023-08-07 | Stop reason: HOSPADM

## 2023-08-03 RX ORDER — MAGNESIUM HYDROXIDE/ALUMINUM HYDROXICE/SIMETHICONE 120; 1200; 1200 MG/30ML; MG/30ML; MG/30ML
30 SUSPENSION ORAL EVERY 4 HOURS PRN
Status: DISCONTINUED | OUTPATIENT
Start: 2023-08-03 | End: 2023-08-07 | Stop reason: HOSPADM

## 2023-08-03 RX ORDER — BUPROPION HYDROCHLORIDE 150 MG/1
300 TABLET ORAL DAILY
Status: DISCONTINUED | OUTPATIENT
Start: 2023-08-03 | End: 2023-08-07 | Stop reason: HOSPADM

## 2023-08-03 RX ORDER — BISACODYL 10 MG
10 SUPPOSITORY, RECTAL RECTAL DAILY PRN
Status: DISCONTINUED | OUTPATIENT
Start: 2023-08-03 | End: 2023-08-07 | Stop reason: HOSPADM

## 2023-08-03 RX ORDER — LORATADINE 10 MG/1
10 TABLET ORAL DAILY
Status: DISCONTINUED | OUTPATIENT
Start: 2023-08-03 | End: 2023-08-07 | Stop reason: HOSPADM

## 2023-08-03 RX ORDER — BENZTROPINE MESYLATE 1 MG/ML
1 INJECTION INTRAMUSCULAR; INTRAVENOUS
Status: DISCONTINUED | OUTPATIENT
Start: 2023-08-03 | End: 2023-08-07 | Stop reason: HOSPADM

## 2023-08-03 RX ORDER — DIPHENHYDRAMINE HYDROCHLORIDE 50 MG/ML
50 INJECTION INTRAMUSCULAR; INTRAVENOUS EVERY 6 HOURS PRN
Status: DISCONTINUED | OUTPATIENT
Start: 2023-08-03 | End: 2023-08-07 | Stop reason: HOSPADM

## 2023-08-03 RX ORDER — HALOPERIDOL 5 MG/1
5 TABLET ORAL
Status: DISCONTINUED | OUTPATIENT
Start: 2023-08-03 | End: 2023-08-07 | Stop reason: HOSPADM

## 2023-08-03 RX ORDER — SPIRONOLACTONE 50 MG/1
50 TABLET, FILM COATED ORAL DAILY
Status: DISCONTINUED | OUTPATIENT
Start: 2023-08-03 | End: 2023-08-07 | Stop reason: HOSPADM

## 2023-08-03 RX ORDER — HYDROXYZINE 50 MG/1
100 TABLET, FILM COATED ORAL
Status: DISCONTINUED | OUTPATIENT
Start: 2023-08-03 | End: 2023-08-07 | Stop reason: HOSPADM

## 2023-08-03 RX ORDER — LORAZEPAM 2 MG/ML
2 INJECTION INTRAMUSCULAR EVERY 6 HOURS PRN
Status: DISCONTINUED | OUTPATIENT
Start: 2023-08-03 | End: 2023-08-07 | Stop reason: HOSPADM

## 2023-08-03 RX ORDER — BENZTROPINE MESYLATE 1 MG/1
1 TABLET ORAL
Status: DISCONTINUED | OUTPATIENT
Start: 2023-08-03 | End: 2023-08-07 | Stop reason: HOSPADM

## 2023-08-03 RX ORDER — HALOPERIDOL 5 MG/ML
2.5 INJECTION INTRAMUSCULAR
Status: DISCONTINUED | OUTPATIENT
Start: 2023-08-03 | End: 2023-08-07 | Stop reason: HOSPADM

## 2023-08-03 RX ORDER — ACETAMINOPHEN 325 MG/1
650 TABLET ORAL EVERY 4 HOURS PRN
Status: DISCONTINUED | OUTPATIENT
Start: 2023-08-03 | End: 2023-08-07 | Stop reason: HOSPADM

## 2023-08-03 RX ORDER — DOCUSATE SODIUM 100 MG/1
100 CAPSULE, LIQUID FILLED ORAL 2 TIMES DAILY
Status: DISCONTINUED | OUTPATIENT
Start: 2023-08-03 | End: 2023-08-07 | Stop reason: HOSPADM

## 2023-08-03 RX ORDER — HALOPERIDOL 0.5 MG/1
1 TABLET ORAL EVERY 6 HOURS PRN
Status: DISCONTINUED | OUTPATIENT
Start: 2023-08-03 | End: 2023-08-07 | Stop reason: HOSPADM

## 2023-08-03 RX ORDER — TRAZODONE HYDROCHLORIDE 50 MG/1
50 TABLET ORAL
Status: DISCONTINUED | OUTPATIENT
Start: 2023-08-03 | End: 2023-08-07 | Stop reason: HOSPADM

## 2023-08-03 RX ORDER — SUMATRIPTAN 25 MG/1
100 TABLET, FILM COATED ORAL 2 TIMES DAILY PRN
Status: DISCONTINUED | OUTPATIENT
Start: 2023-08-03 | End: 2023-08-07 | Stop reason: HOSPADM

## 2023-08-03 RX ORDER — LORAZEPAM 2 MG/ML
1 INJECTION INTRAMUSCULAR
Status: DISCONTINUED | OUTPATIENT
Start: 2023-08-03 | End: 2023-08-07 | Stop reason: HOSPADM

## 2023-08-03 RX ORDER — HYDROXYZINE HYDROCHLORIDE 25 MG/1
25 TABLET, FILM COATED ORAL
Status: DISCONTINUED | OUTPATIENT
Start: 2023-08-03 | End: 2023-08-07 | Stop reason: HOSPADM

## 2023-08-03 RX ORDER — GABAPENTIN 600 MG/1
600 TABLET ORAL 2 TIMES DAILY
Status: DISCONTINUED | OUTPATIENT
Start: 2023-08-03 | End: 2023-08-07 | Stop reason: HOSPADM

## 2023-08-03 RX ORDER — LORAZEPAM 2 MG/ML
2 INJECTION INTRAMUSCULAR
Status: DISCONTINUED | OUTPATIENT
Start: 2023-08-03 | End: 2023-08-07 | Stop reason: HOSPADM

## 2023-08-03 RX ORDER — ACETAMINOPHEN 325 MG/1
650 TABLET ORAL EVERY 6 HOURS PRN
Status: DISCONTINUED | OUTPATIENT
Start: 2023-08-03 | End: 2023-08-07 | Stop reason: HOSPADM

## 2023-08-03 RX ADMIN — FLUTICASONE PROPIONATE 2 SPRAY: 50 SPRAY, METERED NASAL at 13:58

## 2023-08-03 RX ADMIN — DOCUSATE SODIUM 100 MG: 100 CAPSULE, LIQUID FILLED ORAL at 18:02

## 2023-08-03 RX ADMIN — CELECOXIB 200 MG: 100 CAPSULE ORAL at 18:02

## 2023-08-03 RX ADMIN — TOPIRAMATE 150 MG: 25 TABLET, FILM COATED ORAL at 18:02

## 2023-08-03 RX ADMIN — LUBIPROSTONE 8 MCG: 8 CAPSULE, GELATIN COATED ORAL at 21:30

## 2023-08-03 RX ADMIN — LORATADINE 10 MG: 10 TABLET ORAL at 13:58

## 2023-08-03 RX ADMIN — BUPROPION HYDROCHLORIDE 300 MG: 150 TABLET, EXTENDED RELEASE ORAL at 13:58

## 2023-08-03 RX ADMIN — GABAPENTIN 600 MG: 600 TABLET, FILM COATED ORAL at 18:04

## 2023-08-03 RX ADMIN — PAROXETINE 30 MG: 20 TABLET, FILM COATED ORAL at 13:58

## 2023-08-03 RX ADMIN — Medication 3 MG: at 21:30

## 2023-08-03 RX ADMIN — SPIRONOLACTONE 50 MG: 50 TABLET ORAL at 13:58

## 2023-08-03 NOTE — SOCIAL WORK
Sw met with pt in pts room to complete psychosocial, ROIs as new 201, presents calm, reserved. Admitted for MDD, SI no plan. Pt reports feeling safe on unit. Denies current SI/HI/AVH; endorses dep/anx /10. Stressors/limitations: physical health, finances. Coping skills: spending time with partner and pets, art. Strengths: cooperative, support system, negotiates basic needs. Endorses hx AIP [id number], last AIP 5-6yrs ago. Current psych provider- [facility]. Denies SA/HI/violence towards others in last 12 months, denies access to firearms, endorses SI during recent mental health episode. Endorses hx emotional abuse. Denies family hx SI/HI/substance abuse; reports mother had OD SA unsuccessful. Denies hx, current legal issues, substance abuse. Currently lives with Carlota Natividad and pets who are now cared for my [place]; is able to return home. No children, father  2021 from [place], mother in assisted living, good relationship endorsed with brother. Graduated  [employer] ([employer] some college. Denies current employment-$1500 SSDI monthly, denies  hx. Denies assistive devices, physical barriers in the home. Denies religous, cultural needs on unit. Drives self. Denies current POA, advanced directives, guardianship. Pharm: Cheryl Armstrong. Declined pcp follow up, agreeable to OP psych appts upon dc. RUTH signed  Iona Garza ([facility]) [phone] notified of pt's admission, sw/nursing contacts provided via . [address] notified of pt's admission.

## 2023-08-03 NOTE — ED NOTES
CIS prepared EMTALA and Medical Necessity. All appropriate signatures were obtained. Copies were made for the record. Hospital packet is prepared.

## 2023-08-03 NOTE — DISCHARGE INSTR - OTHER ORDERS
You are being discharged to your home located at [address] [place] [address], [address] [facility], [id number]. Phone: [phone]. Triggers you have identified during your hospitalization that led to your admission of a distressed mood include physical health and finances. Coping skills you have identified during your hospitalization include playing with your pets and art. If you are unable to deal with your distressed mood alone please contact Adela Grande (Life Partner) [phone]. If that is not effective and you continue to have a distressed mood, are overwhelmed, or in crisis) please contact (Crisis #) [facility] 67 [phone] 17 G7177688, dial 911 or go to the nearest emergency center. [facility] Crisis Hotline: [address] Suicide Prevention Lifeline:  8-349.977.6740  *Alcohol Anonymous: 434.822.5168  *[place] Drug & Alcohol Commission: (458) 672-3080 [address] on [address] [place] [address]) HELPLINE: 207.317.3356/Website: www.jan.PayDragon  *Substance Abuse and [address] Administration([facility]) [employer], which is a confidential, free, 24-hour-a-day, 365-day-a-year, information service for individuals and family members facing mental health and/or substance use disorders. This service provides referrals to local treatment facilities, support groups, and community-based organizations. Callers can also order free publications and other information. Call 9-733.326.3378/Website: www.[facility].gov  *[facility] 2-1-1: This is a toll free, confidential, 24-hour-a-day service which connects you to a community  in your area who can help you find services and resources that are available to you locally and provide critical services that can improve and save lives. Call: 211  /Website: https://brandeei3 membranelukas.net/     You declined a follow up primary care provider appointment at this time.      Radames Vega or Arlet, our Maddy and Luly, will be calling you after your discharge, on the phone number that you provided. They will be available as an additional support, if needed. If you wish to speak with one of them, you may contact Emmy De La Cruz at [phone] or Dahiana Cochran at [phone]. The treatment team recommends participation in a partial hospitalization program upon discharge; you declined a referral at this time. If you wish to seek this service in the future, please call [address] [place] [address] Partial Hospitalization [facility] at [phone] to schedule an intake appointment. The treatment team recommends participation in a partial hospitalization program with continued medication management, group and individual therapy services upon discharge; you agreed to a referral.  A referral was made on your behalf to [address] [place] [address] Partial Hospitalization Program located at [address] [place] [address] Phone: [phone]. You will begin the program on. .. Please arrive at 730am on your first day to complete intake paperwork. Each consecutive day of participation will begin at 830am.  A bagged lunch is included but you are welcome to bring your own lunch if preferred. There is a refrigerator and microwave on site. Please bring your photo ID and insurance card(s) as well as your medications in their prescription bottles.

## 2023-08-03 NOTE — NURSING NOTE
Patient is a new admission. Patient was cooperative and pleasant during assessment. Patient states she has depression and has suffered from depression for a long time. Patient denies [address]. Patient signed paperwork and was given a tour of the unit. Patient states that she gets headaches all the time and is going to a clinic for the headaches. Patient lives with her fiance and has a good and safe home life. Patient has had some recent family deaths and is trying to cope with that her father passed within last year of covid.

## 2023-08-03 NOTE — ED NOTES
Patient's transport logged in [facility]. Patient will be picked up from [facility].

## 2023-08-03 NOTE — ED NOTES
See previous [facility] Suicide Risk Assessment. Re-screening not required unless change in behavior or suicidal ideation. Behavioral Health Assessment deferred as patient is sleeping and would benefit from additional rest.  Vital signs deferred until patient awake, no signs or symptoms of respiratory distress at this time. Once patient is awake and able to participate, will complete assessments.        Willie Tong RN  08/03/23 8824

## 2023-08-03 NOTE — H&P
Nata Rendon#  [id number]  [id number]:[id number]    NRD:[id number]  Adm Date: 8/3/2023 1237  12:37 PM   ATT PHY: Safia Muñoz, [facility]         Chief Complaint: worsening depression, suicidal ideations      History of Presenting Illness: Laurel Oshea is a(n) 44y.o. year old female who is admitted to [address] [place] [address] on voluntary 201 committment basis. Patient originally presented to [address] [place] [facility] [place] ED on 8/2/2023 for worsening depression with suicidal ideations. Patient examined at bedside. Patient reports history of chronic headaches. She states this has been ongoing for years and follows with neurology whom she last saw on 5/18/2023. Feels current headache regimen does not provide much relief but also feeld headaches may be related to her increased stress/anxiety past few years since her last relationship. She denies any headache currently. Admits to constipation with last BM 2 days ago. Denies nausea, vomiting, lightheadedness, dizziness, abdominal pain, chest pain, shortness of breath.     Allergies   Allergen Reactions   • Doxycycline      Pt refuses d/t remote H/O intracranial hypertension    • Other      Seasonal allergies      Current Facility-Administered Medications on File Prior to Encounter   Medication Dose Route Frequency Provider Last Rate Last Admin   • [COMPLETED] diphenhydrAMINE (BENADRYL) injection 25 mg  25 mg Intravenous Once Laurent Janine, DO   25 mg at 08/02/23 1535   • [COMPLETED] ketorolac (TORADOL) injection 15 mg  15 mg Intravenous Once [place] Janine, DO   15 mg at 08/02/23 1535   • [COMPLETED] magnesium sulfate 2 g/50 mL IVPB (premix) 2 g  2 g Intravenous Once Laurent Janine, DO   Stopped at 08/02/23 1737   • [COMPLETED] metoclopramide (REGLAN) injection 10 mg  10 mg Intravenous Once [place] Janine, DO   10 mg at 08/02/23 1535   • [DISCONTINUED] diphenhydrAMINE (BENADRYL) injection 25 mg  25 mg Intravenous Once Alric Romberg, DO       • [DISCONTINUED] ketorolac (TORADOL) injection 15 mg  15 mg Intravenous Once Alric Romberg, DO       • [DISCONTINUED] metoclopramide (REGLAN) injection 10 mg  10 mg Intravenous Once Alric Romberg, DO         Current Outpatient Medications on File Prior to Encounter   Medication Sig Dispense Refill   • buPROPion (WELLBUTRIN XL) 300 mg 24 hr tablet Take 300 mg by mouth daily     • celecoxib (CeleBREX) 200 mg capsule take 1 capsule by mouth twice a day 60 capsule 4   • Cequa 0.09 % SOLN instill 1 drop into both eyes twice a day     • Cholecalciferol (VITAMIN D3) 5000 units CAPS Take 5,000 Units by mouth daily     • fluticasone (FLONASE) 50 mcg/act nasal spray 2 sprays daily     • folic acid (FOLVITE) 1 mg tablet      • furosemide (LASIX) 40 mg tablet TAKE 1/2 TABLET BY MOUTH DAILY OR 1 AND 1/2 TABS IF WEIGHT GAIN OF 3 POUNDS IN ONE  tablet 2   • gabapentin (Neurontin) 600 MG tablet Take 1 tablet by mouth in the morning, and take 2 tablets by mouth at bedtime. 90 tablet 3   • Galcanezumab-gnlm (Emgality) 120 MG/ML SOAJ Inject 120 mg under the skin every 30 (thirty) days 3 mL 3   • levothyroxine 125 mcg tablet TAKE 1 TABLET BY MOUTH EVERY DAY IN THE MORNING 90 tablet 1   • linaCLOtide (Linzess) 290 MCG CAPS Take 1 capsule by mouth daily 90 capsule 3   • loratadine (CLARITIN) 10 mg tablet Take 10 mg by mouth daily     • LORazepam (ATIVAN) 0.5 mg tablet      • loteprednol etabonate (LOTEMAX) 0.5 % ophthalmic suspension Administer 1 drop to both eyes 2 (two) times a day     • metFORMIN (GLUCOPHAGE) 500 mg tablet take 1 tablet by mouth daily with breakfast 90 tablet 0   • Multiple Vitamin (MULTIVITAMIN) capsule Take 1 capsule by mouth daily     • Nurtec 75 MG TBDP TAKE 75MG BY MOUTH EVERY OTHER DAY.   IF THERE IS A MIGRAINE ON A NON-NURTEC DAY ,YOU CAN TAKE A DOSE AND SKIP THE FOLLOWING DAY 16 tablet 2   • omeprazole (PriLOSEC) 20 mg delayed release capsule Take 1 capsule (20 mg total) by mouth 2 (two) times a day 180 capsule 0   • PARoxetine (PAXIL) 30 mg tablet Take 30 mg by mouth daily     • propranolol (INDERAL) 20 mg tablet Take 20 mg by mouth every 12 (twelve) hours     • semaglutide, 0.25 or 0.5 mg/dose, (Ozempic, 0.25 or 0.5 MG/DOSE,) 2 mg/3 mL injection pen Inject 0.25mg subcutaneously once weekly for 4 weeks and then increase to 0.5mg once weekly 3 mL 1   • spironolactone (ALDACTONE) 50 mg tablet TAKE 1 TABLET BY MOUTH EVERY DAY 90 tablet 1   • SUMAtriptan (IMITREX) 100 mg tablet 1 tab as needed for migraine, can repeat once in 2 hours, max 200mg in 24 hours.  12 tablet 3   • tiZANidine (ZANAFLEX) 2 mg tablet Take 1 tablet (2 mg total) by mouth every 8 (eight) hours as needed for muscle spasms 20 tablet 0   • topiramate (TOPAMAX) 100 mg tablet Take 1.5 tablets (150 mg total) by mouth 2 (two) times a day 270 tablet 1   • Vraylar 3 MG capsule Take 3 mg by mouth daily Daily in the evening     • Vraylar 6 MG capsule Take 6 mg by mouth daily Daily in the morning     • Vyvanse 70 MG capsule Take 70 mg by mouth daily     • [DISCONTINUED] clomiPRAMINE (ANAFRANIL) 50 mg capsule Take by mouth TAKE 2 CAPSULES IN AM AND 3 CAPSULES IN PM       Active Ambulatory Problems     Diagnosis Date Noted   • Obsessive-compulsive disorder 01/24/2018   • Hypertension 01/24/2018   • Hypothyroidism 01/24/2018   • Overactive bladder 01/24/2018   • Allergic rhinitis 01/24/2018   • Binge-eating disorder, severe 09/09/2016   • Gambling disorder, moderate 06/15/2015   • Routine screening for STI (sexually transmitted infection) 02/26/2018   • BMI 50.0-59.9, adult ([address]) 02/26/2018   • Dysfunction of both eustachian tubes 05/20/2018   • Bulging lumbar disc 05/08/2014   • Esophageal reflux 08/15/2013   • EILEEN (generalized anxiety disorder) 11/18/2015   • Lumbar degenerative disc disease 05/08/2014   • Morbid obesity ([address]) 09/25/2014   • Nocturnal enuresis 05/16/2017 • PTSD (post-traumatic stress disorder) 10/12/2017   • Urgency incontinence 07/06/2017   • Vitamin D deficiency 01/15/2014   • Chronic midline low back pain without sciatica 07/17/2018   • Iliotibial band syndrome of left side 09/04/2018   • Piriformis syndrome of left side 09/04/2018   • Skin lesion 09/06/2018   • Recurrent sinusitis 11/29/2018   • IFG (impaired fasting glucose) 11/29/2018   • Family history of cancer 01/22/2019   • Intracranial hypertension 02/28/2019   • Chronic migraine without aura without status migrainosus, not intractable 03/04/2019   • Chronic tension-type headache, not intractable 03/04/2019   • Menorrhagia with irregular cycle 12/30/2019   • Encounter for insertion of mirena IUD 02/10/2020   • CAIO (obstructive sleep apnea)    • IUD check up 03/06/2020   • Class 3 severe obesity with body mass index (BMI) of 60.0 to 69.9 in adult [facility]) 03/06/2020   • Cervicalgia 01/06/2021   • Numbness and tingling in both hands 09/24/2021   • Dermatitis 10/16/2021   • Chronic constipation 10/16/2021   • Thyroid disease neuropathy ([address]) 03/19/2022   • Encounter for medication monitoring 07/06/2022   • Hypervolemia 07/06/2022   • Migraine headache 08/22/2022   • Hyperaldosteronism ([address]) 09/16/2022   • Persistent proteinuria 01/05/2023   • Chronic ethmoidal sinusitis 03/14/2023   • Chronic maxillary sinusitis 03/14/2023     Resolved Ambulatory Problems     Diagnosis Date Noted   • Bipolar I disorder, most recent episode depressed, severe without psychotic features ([address]) 01/09/2018   • Encntr for gyn exam (general) (routine) w abnormal findings 02/26/2018   • Vaginal relaxation 02/26/2018   • Amenorrhea 05/17/2012   • RLS (restless legs syndrome) 07/21/2016   • Irregular bleeding 08/02/2018   • Multiple nevi 09/06/2018   • Breast nodule 12/04/2018   • Chronic headache 02/28/2019   • Acute headache 02/28/2019   • Visual field defect 02/28/2019   • Encntr for gyn exam (general) (routine) w/o abn findings 03/06/2020   • Volume overload 10/16/2021     Past Medical History:   Diagnosis Date   • Allergic    • Anorexia nervosa in remission    • Anxiety    • Arthritis 5/8/2014   • Back pain    • Bipolar disorder ([address])    • Depression    • Disease of thyroid gland    • Diverticulitis of colon 9/20/2015   • Dizziness    • Ear problems    • Eating disorder    • GERD (gastroesophageal reflux disease) 8/15/2013   • Headache(784.0)    • Headache, tension-type    • Hypothyroid    • Idiopathic intracranial hypertension    • Migraine    • Nasal congestion    • Nosebleed    • Obesity    • Otitis media    • Psychiatric disorder    • Restless leg syndrome, controlled    • Seasonal allergies    • Sinusitis    • Sleep apnea    • Sleep difficulties    • Suicide and self-inflicted injury ([address])    • Tinnitus    • TMJ dysfunction    • Tonsillitis    • Transcranial Magnetic Stimulation 09/06/2021   • Urinary tract infection    • Vision loss      Past Surgical History:   Procedure Laterality Date   • DENTAL SURGERY      wisdom teeth-at [age] years.  Other surgery dental extraxtion of bone spur (10 years ago)   • IR LUMBAR PUNCTURE  02/26/2019   • SINUS ENDOSCOPY     • SINUS SURGERY     • TONSILLECTOMY       Social History:   Social History     Socioeconomic History   • Marital status: Single     Spouse name: None   • Number of children: None   • Years of education: None   • Highest education level: None   Occupational History   • Occupation: DISABLED   Tobacco Use   • Smoking status: Never     Passive exposure: Never   • Smokeless tobacco: Never   Vaping Use   • Vaping Use: Never used   Substance and Sexual Activity   • Alcohol use: Never   • Drug use: Never   • Sexual activity: Yes     Partners: Female     Comment: No STDs   Other Topics Concern   • None   Social History Narrative    Always uses seat belts    Caffeine use     Disabled - permament disability since 2008 due to psychiatric illness    Has no children    Single     Tea     Social Determinants of Health     Financial Resource Strain: Medium Risk (3/15/2023)    Overall Financial Resource Strain (CARDIA)    • Difficulty of Paying Living Expenses: Somewhat hard   Food Insecurity: No Food Insecurity (9/16/2020)    Hunger Vital Sign    • Worried About Running Out of Food in the Last Year: Never true    • Ran Out of Food in the Last Year: Never true   Transportation Needs: No Transportation Needs (3/15/2023)    PRAPARE - Transportation    • Lack of Transportation (Medical): No    • Lack of Transportation (Non-Medical): No   Physical Activity: Not on file   Stress: Not on file   Social Connections: Not on file   Intimate Partner Violence: Not on file   Housing Stability: Not on file     Family History:   Family History   Problem Relation Age of Onset   • Mental illness Mother    • Depression Mother    • Suicide Attempts Mother    • Hypertension Mother    • Diabetes Mother    • Other Mother         bicuspid aortic valve   • Anxiety disorder Mother    • Psychiatric Illness Mother    • Schizoaffective Disorder  Mother    • Hypertension Father    • Hypothyroidism Father    • Thyroid disease Father    • Hypertension Brother    • Cancer Maternal Grandmother    • Heart disease Maternal Grandmother    • Stroke Maternal Grandmother    • Breast cancer Maternal Grandmother    • Skin cancer Maternal Grandmother    • Arthritis Maternal Grandmother    • Pneumonia Maternal Grandfather    • Cancer Maternal Grandfather    • Dementia Maternal Grandfather    • COPD Maternal Grandfather    • Cancer Paternal Grandmother    • Pneumonia Paternal Grandfather    • Arthritis Family    • Breast cancer Family    • Osteopenia Family    • Osteoporosis Family    • Hypertension Family    • Transient ischemic attack Family      Review of Systems   Constitutional: Negative. Negative for chills and fever. HENT: Negative. Negative for ear pain and sore throat. Eyes: Negative. Negative for pain and visual disturbance. Respiratory: Negative. Negative for cough and shortness of breath. Cardiovascular: Negative. Negative for chest pain and palpitations. Gastrointestinal: Positive for constipation. Negative for abdominal pain and vomiting. Genitourinary: Negative. Negative for difficulty urinating, dysuria, frequency and hematuria. Musculoskeletal: Positive for arthralgias, back pain and myalgias. Skin: Negative. Negative for color change and rash. Neurological: Positive for headaches. Negative for dizziness and light-headedness. Psychiatric/Behavioral: Positive for dysphoric mood. All other systems reviewed and are negative. Physical Exam   Vitals: Blood pressure 138/75, pulse 89, temperature 98.5 °F (36.9 °C), resp. rate 18, height 5' 7" (1.702 m), weight (!) 174 kg (384 lb), last menstrual period 07/26/2023. ,Body mass index is 60.14 kg/m². Constitutional: Awake, alert, in no acute distress, morbidly obese. Head: Normocephalic and atraumatic. Mouth/Throat: Oropharynx is clear and moist.    Eyes: Conjunctivae and EOM are normal.   Neck: Neck supple. No thyromegaly present. Cardiovascular: Normal rate, regular rhythm and normal heart sounds. Pulmonary/Chest: Effort normal and breath sounds normal.   Abdominal: Soft. Bowel sounds are normal. There is no tenderness. There is no rebound and no guarding. Neurological:  No focal deficits. Skin: Skin is warm and dry. No edema. Assessment     Sabino Alford is a(n) 44 y.o. female with MDD. 1.  Hypothyroidism. Continue levothyroxine 125 mcg daily. TSH WNL on 8/2/23. 2.  Constipation. Amitiza 8 mcg twice daily (linzess nonformulary), colace 100 mg twice daily, Miralax and Senokot S as needed. 3.  Allergic rhinitis. Patient is on Claritin 10 mg daily, Flonase nasal spray. 4.  GERD. Protonix 40 mg daily (omeprazole nonformulary). 5.  Chronic headaches.   Continue Topamax 150 mg twice daily, propranolol 20 mg twice daily, Imitrex 100 mg as needed. 6.  Chronic back pain. Patient is on gabapentin 600 mg twice daily, Celebrex 200 mg twice daily. 7.  Dry eyes. Continue cyclosporine eye drops (brought from home). 8.  Morbid obesity/prediabetes. Ozempic nonformulary-pt will try to have someone bring from home. Continue metformin 500 mg daily. Hgb a1c 5.6% on 3/8/2022.  9.  Hyperaldosteronism. Continue spironolactone 50 mg daily. 10.  Psych with MDD. This is being managed by the psych team.     Prognosis: Fair. Discharge Plan: In progress. Advanced Directives: I have discussed in detail with the patient the advanced directives. The patient does not have an appointed POA and does not have a living will. Patient's emergency contact is her partner, Aster Stevens, whose number is [phone]. When discussing cardiac and pulmonary resuscitation efforts with the patient, the patient wishes to be full code. I have spent more than 50 minutes gathering data, doing physical examination, and discussing the advanced directives, which was witnessed by caring staff. The patient was discussed with Dr. Maik Alamo and he is in agreement with the above note.

## 2023-08-03 NOTE — ED CARE HANDOFF
Emergency Department Sign Out Note        Sign out and transfer of care from Dr. Elo lBack. See Separate Emergency Department note. The patient, Boom Diez, was evaluated by the previous provider for passive suicidality and depression. .    Workup Completed:  Patient had an emergency department work-up prior to my evaluation which consisted of rapid drug screen, POC pregnancy, urinalysis, TSH and magnesium level. CBC showed white count of 11.08. ED Course / Workup Pending (followup): Patient is currently medically cleared and was seen by crisis. Patient is currently awaiting bed placement. ED Course as of 08/05/23 0741   Wed Aug 02, 2023   2143 Received in signout. Patient medically cleared prior to my arrival.  Patient signed a 201 for passive suicidality. Thu Aug 03, 2023   0628 Case signed out to Dr. Disha Matute pending behavioral health bed placement. Procedures  MDM        Disposition  Final diagnoses:   Headache   Depression   Suicidal ideation     Time reflects when diagnosis was documented in both MDM as applicable and the Disposition within this note     Time User Action Codes Description Comment    8/2/2023  4:08 PM Mozella Cutter Add [R51.9] Headache     8/2/2023  4:08 PM Mozella Cutter Add Rama.Loots. A] Depression     8/2/2023  4:08 PM Mozella Cutter Add [Y33.634] Suicidal ideation     8/2/2023  7:55 PM George Gaspar Add [E26.9] Hyperaldosteronism [facility])       ED Disposition     ED Disposition   Transfer to [address] [place] [address]   --    Date/Time   Wed Aug 2, 2023  4:08 PM    Comment   Boom Diez should be transferred out to bed search and has been medically cleared.            MD Documentation    Alexi Ferrer Most Recent Value   Patient Condition The patient has been stabilized such that within reasonable medical probability, no material deterioration of the patient condition or the condition of the unborn child(kendall) is likely to result from the transfer   Reason for Transfer Level of Care needed not available at this facility   Benefits of Transfer Continuity of care   Risks of Transfer Potential for delay in receiving treatment   Accepting Physician Dr. Davion Drew Name, [address] [facility] [address] [facility] [address] [facility] [address]    (Name & Tel number) Poli Mcgregor [phone]   Transported by [employer] and Unit #) Lisette Cervantes   Sending MD Dr. Rey Hickman   Provider Certification The patient is stable for psychiatric transfer because they are medically stable, and is protected from harming him/herself or others during transport      RN Documentation    [address] Name, [address] [facility] [address] [facility] [address] [facility]    (Name & Tel number) Poli Mcgregor [phone]   Report Given to [facility]   Transported by [facility] and Unit #) [facility]   Level of Care Basic life support   Copies of Medical Records Sent History and Physical   Patient Belongings Disposition Sent with patient   Transfer Date 08/03/23      Follow-up Information    None       Discharge Medication List as of 8/3/2023 12:09 PM      CONTINUE these medications which have NOT CHANGED    Details   buPROPion (WELLBUTRIN XL) 300 mg 24 hr tablet Take 300 mg by mouth daily, Starting Fri 3/24/2023, Historical Med      celecoxib (CeleBREX) 200 mg capsule take 1 capsule by mouth twice a day, Normal      Cequa 0.09 % SOLN instill 1 drop into both eyes twice a day, Historical Med      Cholecalciferol (VITAMIN D3) 5000 units CAPS Take 5,000 Units by mouth daily, Historical Med      fluticasone (FLONASE) 50 mcg/act nasal spray 2 sprays daily, Starting Sun 4/16/2023, Historical Med      folic acid (FOLVITE) 1 mg tablet Starting Fri 1/27/2023, Historical Med      furosemide (LASIX) 40 mg tablet TAKE 1/2 TABLET BY MOUTH DAILY OR 1 AND 1/2 TABS IF WEIGHT GAIN OF 3 POUNDS IN ONE DAY, Normal      gabapentin (Neurontin) 600 MG tablet Take 1 tablet by mouth in the morning, and take 2 tablets by mouth at bedtime., Normal      Galcanezumab-gnlm (Emgality) 120 MG/ML SOAJ Inject 120 mg under the skin every 30 (thirty) days, Starting Wed 6/28/2023, Normal      levothyroxine 125 mcg tablet TAKE 1 TABLET BY MOUTH EVERY DAY IN THE MORNING, Normal      linaCLOtide (Linzess) 290 MCG CAPS Take 1 capsule by mouth daily, Starting Tue 6/20/2023, Until Mon 9/18/2023, Normal      loratadine (CLARITIN) 10 mg tablet Take 10 mg by mouth daily, Historical Med      LORazepam (ATIVAN) 0.5 mg tablet Starting Wed 11/24/2021, Historical Med      loteprednol etabonate (LOTEMAX) 0.5 % ophthalmic suspension Administer 1 drop to both eyes 2 (two) times a day, Starting Wed 12/21/2022, Historical Med      metFORMIN (GLUCOPHAGE) 500 mg tablet take 1 tablet by mouth daily with breakfast, Normal      Multiple Vitamin (MULTIVITAMIN) capsule Take 1 capsule by mouth daily, Historical Med      Nurtec 75 MG TBDP TAKE 75MG BY MOUTH EVERY OTHER DAY.   IF THERE IS A MIGRAINE ON A NON-NURTEC DAY ,YOU CAN TAKE A DOSE AND SKIP THE FOLLOWING DAY, Normal      omeprazole (PriLOSEC) 20 mg delayed release capsule Take 1 capsule (20 mg total) by mouth 2 (two) times a day, Starting Tue 6/6/2023, Normal      PARoxetine (PAXIL) 30 mg tablet Take 30 mg by mouth daily, Starting Wed 2/22/2023, Historical Med      propranolol (INDERAL) 20 mg tablet Take 20 mg by mouth every 12 (twelve) hours, Starting Fri 1/27/2023, Historical Med      semaglutide, 0.25 or 0.5 mg/dose, (Ozempic, 0.25 or 0.5 MG/DOSE,) 2 mg/3 mL injection pen Inject 0.25mg subcutaneously once weekly for 4 weeks and then increase to 0.5mg once weekly, Normal      spironolactone (ALDACTONE) 50 mg tablet TAKE 1 TABLET BY MOUTH EVERY DAY, Normal      SUMAtriptan (IMITREX) 100 mg tablet 1 tab as needed for migraine, can repeat once in 2 hours, max 200mg in 24 hours. , Normal      tiZANidine (ZANAFLEX) 2 mg tablet Take 1 tablet (2 mg total) by mouth every 8 (eight) hours as needed for muscle spasms, Starting u 5/18/2023, Normal      topiramate (TOPAMAX) 100 mg tablet Take 1.5 tablets (150 mg total) by mouth 2 (two) times a day, Starting Mon 3/27/2023, Normal      !! Vraylar 3 MG capsule Take 3 mg by mouth daily Daily in the evening, Starting Mon 1/16/2023, Historical Med      !! Vraylar 6 MG capsule Take 6 mg by mouth daily Daily in the morning, Starting Mon 6/1/2020, Historical Med      Vyvanse 70 MG capsule Take 70 mg by mouth daily, Starting Thu 4/27/2023, Historical Med      clomiPRAMINE (ANAFRANIL) 50 mg capsule Take by mouth TAKE 2 CAPSULES IN AM AND 3 CAPSULES IN PM, Historical Med       !! - Potential duplicate medications found. Please discuss with provider. No discharge procedures on file.        ED Provider  Electronically Signed by     Ivy Romero,   08/05/23 5847

## 2023-08-03 NOTE — ED NOTES
Insurance Authorization for admission:   Phone call placed to [address] [place] [address]. Phone number: [phone]. Spoke to Tatyana Lau. 4 days approved, LCD 8/6. Level of care: IPBH. Review on 8/7. Authorization # [id number]. Insurance COB for Admission:   Phone call placed to [facility]. Phone number: [phone]. Spoke to [facility] [employer]. Level of care: IP. Call upon patient's arrival.  Authorization # To be issued. Insurance Authorization for Transportation:    A separate authorization for transport outside of the [facility] authorization is not required.

## 2023-08-03 NOTE — ED NOTES
Patient resting comfortably in bed. Pt sleeping. Pt left in room so she can rest. Pt will move beds when awake.      Kendall Cost  08/03/23 7042

## 2023-08-03 NOTE — PLAN OF CARE
Problem: DISCHARGE PLANNING - CARE MANAGEMENT  Goal: Discharge to post-acute care or home with appropriate resources  Description: INTERVENTIONS:  - Conduct assessment to determine patient/family and health care team treatment goals, and need for post-acute services based on payer coverage, community resources, and patient preferences, and barriers to discharge  - Address psychosocial, clinical, and financial barriers to discharge as identified in assessment in conjunction with the patient/family and health care team  - Arrange appropriate level of post-acute services according to patient’s   needs and preference and payer coverage in collaboration with the physician and health care team  - Communicate with and update the patient/family, physician, and health care team regarding progress on the discharge plan  - Arrange appropriate transportation to post-acute venues  Outcome: Progressing     New 201, goal initiated.

## 2023-08-03 NOTE — ED NOTES
Patient is accepted at [facility]. Patient is accepted by Dr. Cuate Terry per Cathy/Intake. Transportation is arranged with (pending). Transportation is scheduled for TOMORROW 8/3. Patient may go to the floor at (pending). Nurse report is to be called to [phone] prior to patient transfer.

## 2023-08-03 NOTE — ED NOTES
Crisis went in to speak with patient about placement at [facility] for tomorrow. Patient is sleeping. Patient will be updated when awake.

## 2023-08-03 NOTE — ED NOTES
[place]: [place]. Recipient #[id number]. [facility] managed by [facility]. Primary payor is [address] [place] [address]

## 2023-08-03 NOTE — ED NOTES
.See previous [facility] Suicide Risk Assessment. Re-screening not required unless change in behavior or suicidal ideation. Behavioral Health Assessment deferred as patient is sleeping and would benefit from additional rest.  Vital signs deferred until patient awake, no signs or symptoms of respiratory distress at this time. Once patient is awake and able to participate, will complete assessments.        Gerhardt Matters  08/03/23 0874

## 2023-08-03 NOTE — ED PROVIDER NOTES
History  Chief Complaint   Patient presents with   • Headache     Started approx 4 years ago with headaches and has been evaluated for them but unknown why she gets them. The headaches are making her be depressed due to the headaches interfering with her life. Has an appointment at [facility] in [address] [place] [address] at the end of this month   • Psychiatric Evaluation     37F history of intracranial hypertension, depression, bipolar disorder, presents with multiple complaints. Patient states she has had intermittent headaches over the last several months for which she sees neurology. Patient says that she is also had increased and worsening depression. She is on multiple deaths in her family. She does state that she has passive suicidal thoughts however no intent or plan to act on them and states that she does not want to kill herself.'s assessment ongoing issue as part of her depression. She states that her depression is now interfering with her and she does not want to get out of bed and most days she does not house. Describes anhedonia, and sleep changes. Prior to Admission Medications   Prescriptions Last Dose Informant Patient Reported? Taking? Cequa 0.09 % SOLN  Self Yes No   Sig: instill 1 drop into both eyes twice a day   Cholecalciferol (VITAMIN D3) 5000 units CAPS  Self Yes No   Sig: Take 5,000 Units by mouth daily   Galcanezumab-gnlm (Emgality) 120 MG/ML SOAJ   No No   Sig: Inject 120 mg under the skin every 30 (thirty) days   LORazepam (ATIVAN) 0.5 mg tablet  Self Yes No   Multiple Vitamin (MULTIVITAMIN) capsule  Self Yes No   Sig: Take 1 capsule by mouth daily   Nurtec 75 MG TBDP   No No   Sig: TAKE 75MG BY MOUTH EVERY OTHER DAY.   IF THERE IS A MIGRAINE ON A NON-NURTEC DAY ,YOU CAN TAKE A DOSE AND SKIP THE FOLLOWING DAY   PARoxetine (PAXIL) 30 mg tablet  Self Yes No   Sig: Take 30 mg by mouth daily   SUMAtriptan (IMITREX) 100 mg tablet  Self No No   Si tab as needed for migraine, can repeat once in 2 hours, max 200mg in 24 hours. Vraylar 3 MG capsule  Self Yes No   Sig: Take 3 mg by mouth daily Daily in the evening   Vraylar 6 MG capsule  Self Yes No   Sig: Take 6 mg by mouth daily Daily in the morning   Vyvanse 70 MG capsule  Self Yes No   Sig: Take 70 mg by mouth daily   buPROPion (WELLBUTRIN XL) 300 mg 24 hr tablet  Self Yes No   Sig: Take 300 mg by mouth daily   celecoxib (CeleBREX) 200 mg capsule   No No   Sig: take 1 capsule by mouth twice a day   clomiPRAMINE (ANAFRANIL) 50 mg capsule  Self Yes No   Sig: Take by mouth TAKE 2 CAPSULES IN AM AND 3 CAPSULES IN PM   fluticasone (FLONASE) 50 mcg/act nasal spray  Self Yes No   Si sprays daily   folic acid (FOLVITE) 1 mg tablet  Self Yes No   furosemide (LASIX) 40 mg tablet  Self No No   Sig: TAKE 1/2 TABLET BY MOUTH DAILY OR 1 AND 1/2 TABS IF WEIGHT GAIN OF 3 POUNDS IN ONE DAY   gabapentin (Neurontin) 600 MG tablet  Self No No   Sig: Take 1 tablet by mouth in the morning, and take 2 tablets by mouth at bedtime.    levothyroxine 125 mcg tablet  Self No No   Sig: TAKE 1 TABLET BY MOUTH EVERY DAY IN THE MORNING   linaCLOtide (Linzess) 290 MCG CAPS   No No   Sig: Take 1 capsule by mouth daily   loratadine (CLARITIN) 10 mg tablet  Self Yes No   Sig: Take 10 mg by mouth daily   loteprednol etabonate (LOTEMAX) 0.5 % ophthalmic suspension  Self Yes No   Sig: Administer 1 drop to both eyes 2 (two) times a day   metFORMIN (GLUCOPHAGE) 500 mg tablet   No No   Sig: take 1 tablet by mouth daily with breakfast   omeprazole (PriLOSEC) 20 mg delayed release capsule   No No   Sig: Take 1 capsule (20 mg total) by mouth 2 (two) times a day   propranolol (INDERAL) 20 mg tablet  Self Yes No   Sig: Take 20 mg by mouth every 12 (twelve) hours   semaglutide, 0.25 or 0.5 mg/dose, (Ozempic, 0.25 or 0.5 MG/DOSE,) 2 mg/3 mL injection pen   No No   Sig: Inject 0.25mg subcutaneously once weekly for 4 weeks and then increase to 0.5mg once weekly   spironolactone (ALDACTONE) 50 mg tablet  Self No No   Sig: TAKE 1 TABLET BY MOUTH EVERY DAY   tiZANidine (ZANAFLEX) 2 mg tablet  Self No No   Sig: Take 1 tablet (2 mg total) by mouth every 8 (eight) hours as needed for muscle spasms   topiramate (TOPAMAX) 100 mg tablet  Self No No   Sig: Take 1.5 tablets (150 mg total) by mouth 2 (two) times a day      Facility-Administered Medications Last Administration Doses Remaining   Botulinum Toxin Type A SOLR 200 Units 4/1/2021  2:53 PM    Botulinum Toxin Type A SOLR 200 Units 7/28/2021 12:11 PM           Past Medical History:   Diagnosis Date   • Allergic    • Allergic rhinitis    • Anorexia nervosa in remission    • Anxiety    • Arthritis 5/8/2014   • Back pain    • Bipolar disorder ([address])    • Depression    • Disease of thyroid gland    • Diverticulitis of colon 9/20/2015   • Dizziness    • Ear problems    • Eating disorder    • Esophageal reflux 08/15/2013   • GERD (gastroesophageal reflux disease) 8/15/2013   • Headache(784.0)    • Headache, tension-type    • Hypertension    • Hypothyroid    • Idiopathic intracranial hypertension    • Lumbar degenerative disc disease 05/08/2014   • Migraine    • Nasal congestion    • Nosebleed    • Obesity    • Obsessive-compulsive disorder    • Otitis media    • Overactive bladder    • Psychiatric disorder    • Restless leg syndrome, controlled    • Seasonal allergies    • Sinusitis    • Sleep apnea    • Sleep difficulties    • Suicide and self-inflicted injury ([address])    • Tinnitus    • TMJ dysfunction    • Tonsillitis    • Transcranial Magnetic Stimulation 09/06/2021   • Urinary tract infection    • Vision loss        Past Surgical History:   Procedure Laterality Date   • DENTAL SURGERY      wisdom teeth-at [age] years.  Other surgery dental extraxtion of bone spur (10 years ago)   • IR LUMBAR PUNCTURE  02/26/2019   • SINUS ENDOSCOPY     • SINUS SURGERY     • TONSILLECTOMY         Family History   Problem Relation Age of Onset   • Mental illness Mother    • Depression Mother    • Suicide Attempts Mother    • Hypertension Mother    • Diabetes Mother    • Other Mother         bicuspid aortic valve   • Anxiety disorder Mother    • Psychiatric Illness Mother    • Schizoaffective Disorder  Mother    • Hypertension Father    • Hypothyroidism Father    • Thyroid disease Father    • Hypertension Brother    • Cancer Maternal Grandmother    • Heart disease Maternal Grandmother    • Stroke Maternal Grandmother    • Breast cancer Maternal Grandmother    • Skin cancer Maternal Grandmother    • Arthritis Maternal Grandmother    • Pneumonia Maternal Grandfather    • Cancer Maternal Grandfather    • Dementia Maternal Grandfather    • COPD Maternal Grandfather    • Cancer Paternal Grandmother    • Pneumonia Paternal Grandfather    • Arthritis Family    • Breast cancer Family    • Osteopenia Family    • Osteoporosis Family    • Hypertension Family    • Transient ischemic attack Family      I have reviewed and agree with the history as documented. E-Cigarette/Vaping   • E-Cigarette Use Never User      E-Cigarette/Vaping Substances   • Nicotine No    • THC No    • CBD No    • Flavoring No    • Other No    • Unknown No      Social History     Tobacco Use   • Smoking status: Never   • Smokeless tobacco: Never   Vaping Use   • Vaping Use: Never used   Substance Use Topics   • Alcohol use: Never   • Drug use: Never       Review of Systems    Physical Exam  Physical Exam  Vitals and nursing note reviewed. Constitutional:       Appearance: Normal appearance. She is well-developed. HENT:      Head: Normocephalic and atraumatic. Eyes:      Conjunctiva/sclera: Conjunctivae normal.      Pupils: Pupils are equal, round, and reactive to light. Neck:      Trachea: No tracheal deviation. Cardiovascular:      Rate and Rhythm: Normal rate and regular rhythm. Heart sounds: Normal heart sounds. No murmur heard. Pulmonary:      Effort: Pulmonary effort is normal. No respiratory distress.       Breath sounds: Normal breath sounds. No wheezing or rales. Abdominal:      General: Bowel sounds are normal. There is no distension. Palpations: Abdomen is soft. Tenderness: There is no abdominal tenderness. Musculoskeletal:         General: No deformity. Cervical back: Normal range of motion and neck supple. Skin:     General: Skin is warm and dry. Capillary Refill: Capillary refill takes less than 2 seconds. Neurological:      Mental Status: She is alert and oriented to person, place, and time. Sensory: No sensory deficit. Psychiatric:         Attention and Perception: Attention normal.         Mood and Affect: Mood normal.         Speech: Speech normal.         Behavior: Behavior is cooperative. Thought Content: Thought content includes suicidal ideation. Thought content does not include homicidal ideation. Thought content does not include homicidal or suicidal plan.          Judgment: Judgment normal.         Vital Signs  ED Triage Vitals   Temperature Pulse Respirations Blood Pressure SpO2   08/02/23 1415 08/02/23 1415 08/02/23 1415 08/02/23 1415 08/02/23 1415   (!) 97 °F (36.1 °C) 77 18 133/76 97 %      Temp Source Heart Rate Source Patient Position - Orthostatic VS BP Location FiO2 (%)   08/02/23 1415 08/02/23 1415 08/02/23 1415 08/02/23 1415 --   Tympanic Monitor Sitting Right arm       Pain Score       08/02/23 1600       5           Vitals:    08/02/23 1415   BP: 133/76   Pulse: 77   Patient Position - Orthostatic VS: Sitting         Visual Acuity  Visual Acuity    Flowsheet Row Most Recent Value   L Pupil Size (mm) 3   R Pupil Size (mm) 3          ED Medications  Medications   diphenhydrAMINE (BENADRYL) injection 25 mg (25 mg Intravenous Given 8/2/23 1535)   metoclopramide (REGLAN) injection 10 mg (10 mg Intravenous Given 8/2/23 1535)   ketorolac (TORADOL) injection 15 mg (15 mg Intravenous Given 8/2/23 1535)   magnesium sulfate 2 g/50 mL IVPB (premix) 2 g (0 g Intravenous Stopped 8/2/23 1737)       Diagnostic Studies  Results Reviewed     Procedure Component Value Units Date/Time    Rapid drug screen, urine [[id number]]  (Abnormal) Collected: 08/02/23 1534    Lab Status: Final result Specimen: Urine, Clean Catch Updated: 08/02/23 1623     Amph/Meth UR Positive     Barbiturate Ur Negative     Benzodiazepine Urine Negative     Cocaine Urine Negative     Methadone Urine Negative     Opiate Urine Negative     PCP Ur Negative     THC Urine Negative     Oxycodone Urine Negative    Narrative:      FOR MEDICAL PURPOSES ONLY. IF CONFIRMATION NEEDED PLEASE CONTACT THE LAB WITHIN 5 DAYS.     Drug Screen Cutoff Levels:  AMPHETAMINE/METHAMPHETAMINES  1000 ng/mL  BARBITURATES     200 ng/mL  BENZODIAZEPINES     200 ng/mL  COCAINE      300 ng/mL  METHADONE      300 ng/mL  OPIATES      300 ng/mL  PHENCYCLIDINE     25 ng/mL  THC       50 ng/mL  OXYCODONE      100 ng/mL    TSH, 3rd generation with Free T4 reflex [[id number]]  (Normal) Collected: 08/02/23 1532    Lab Status: Final result Specimen: Blood from Arm, Left Updated: 08/02/23 1619     TSH 3RD GENERATON 1.665 uIU/mL     Basic metabolic panel [[id number]] Collected: 08/02/23 1532    Lab Status: Final result Specimen: Blood from Arm, Left Updated: 08/02/23 1602     Sodium 138 mmol/L      Potassium 3.8 mmol/L      Chloride 104 mmol/L      CO2 26 mmol/L      ANION GAP 8 mmol/L      BUN 23 mg/dL      Creatinine 0.85 mg/dL      Glucose 89 mg/dL      Calcium 9.1 mg/dL      eGFR 87 ml/min/1.73sq m     Narrative:      [place] guidelines for Chronic Kidney Disease (CKD):   •  Stage 1 with normal or high GFR (GFR > 90 mL/min/1.73 square meters)  •  Stage 2 Mild CKD (GFR = 60-89 mL/min/1.73 square meters)  •  Stage 3A Moderate CKD (GFR = 45-59 mL/min/1.73 square meters)  •  Stage 3B Moderate CKD (GFR = 30-44 mL/min/1.73 square meters)  •  Stage 4 Severe CKD (GFR = 15-29 mL/min/1.73 square meters)  •  Stage 5 End Stage CKD (GFR <15 mL/min/1.73 square meters)  Note: GFR calculation is accurate only with a steady state creatinine    Magnesium [[id number]]  (Normal) Collected: 08/02/23 1532    Lab Status: Final result Specimen: Blood from Arm, Left Updated: 08/02/23 1602     Magnesium 2.0 mg/dL     UA w Reflex to Microscopic w Reflex to Culture [[id number]]  (Normal) Collected: 08/02/23 1534    Lab Status: Final result Specimen: Urine, Clean Catch Updated: 08/02/23 1546     Color, UA Yellow     Clarity, UA Clear     Specific Gravity, UA 1.025     pH, UA 6.0     Leukocytes, UA Negative     Nitrite, UA Negative     Protein, UA Negative mg/dl      Glucose, UA Negative mg/dl      Ketones, UA Negative mg/dl      Urobilinogen, UA 0.2 E.U./dl      Bilirubin, UA Negative     Occult Blood, UA Negative    CBC and differential [[id number]]  (Abnormal) Collected: 08/02/23 1532    Lab Status: Final result Specimen: Blood from Arm, Left Updated: 08/02/23 1546     WBC 11.08 Thousand/uL      RBC 5.08 Million/uL      Hemoglobin 14.9 g/dL      Hematocrit 46.9 %      MCV 92 fL      MCH 29.3 pg      MCHC 31.8 g/dL      RDW 13.4 %      MPV 9.4 fL      Platelets 522 Thousands/uL      nRBC 0 /100 WBCs      Neutrophils Relative 64 %      Immat GRANS % 0 %      Lymphocytes Relative 26 %      Monocytes Relative 6 %      Eosinophils Relative 3 %      Basophils Relative 1 %      Neutrophils Absolute 7.13 Thousands/µL      Immature Grans Absolute 0.04 Thousand/uL      Lymphocytes Absolute 2.85 Thousands/µL      Monocytes Absolute 0.66 Thousand/µL      Eosinophils Absolute 0.32 Thousand/µL      Basophils Absolute 0.08 Thousands/µL     POCT pregnancy, urine [[id number]]  (Normal) Resulted: 08/02/23 1542    Lab Status: Final result Updated: 08/02/23 1543     EXT Preg Test, Ur Negative     Control Valid                 No orders to display              Procedures  Procedures         ED Course                               SBIRT 22yo+    Flowsheet Row Most Recent Value Initial Alcohol Screen: US AUDIT-C     1. How often do you have a drink containing alcohol? 0 Filed at: 08/02/2023 1414   2. How many drinks containing alcohol do you have on a typical day you are drinking? 0 Filed at: 08/02/2023 1414   3a. Male UNDER 65: How often do you have five or more drinks on one occasion? 0 Filed at: 08/02/2023 1414   3b. FEMALE Any Age, or MALE 65+: How often do you have 4 or more drinks on one occassion? 0 Filed at: 08/02/2023 1414   Audit-C Score 0 Filed at: 08/02/2023 1414   JENNIFER: How many times in the past year have you. .. Used an illegal drug or used a prescription medication for non-medical reasons? Never Filed at: 08/02/2023 1414                    Medical Decision Making  68-year-old female with complaint of headache consistent with her IIH. No further imaging or work-up required, although patient will require IV medications/migraine cocktail to help with her symptoms. We will check blood work for electrolyte abnormality. Blood work reassuring. Patient with worsening depression, does have some passive suicidal thoughts however no plan and has good insight into these thoughts and understands that they are pathologic related to her depression. This also appears to be an ongoing issue and I do not feel that patient is a clear and present danger to herself because of them. That being said she does want to sign herself in voluntarily for mental health as it is interfering with her life and causing significant issues    Labs reassuring. Patient is medically cleared for inpatient psych    Depression: acute illness or injury  Headache: acute illness or injury  Suicidal ideation: acute illness or injury  Amount and/or Complexity of Data Reviewed  Labs: ordered. Decision-making details documented in ED Course. Risk  Prescription drug management. Decision regarding hospitalization.           Disposition  Final diagnoses:   Headache   Depression   Suicidal ideation Time reflects when diagnosis was documented in both MDM as applicable and the Disposition within this note     Time User Action Codes Description Comment    8/2/2023  4:08 PM Freeda Slider Add [R51.9] Headache     8/2/2023  4:08 PM Freeda Slider Add Carrie.Post. A] Depression     8/2/2023  4:08 PM Freeda Slider Add [Q95.565] Suicidal ideation     8/2/2023  7:55 PM Etsarkis Alonso Add [E26.9] Hyperaldosteronism [facility])       ED Disposition     ED Disposition   Transfer to [address] [place] [address]   --    Date/Time   Wed Aug 2, 2023  4:08 PM    Comment   Reed Quintanilla should be transferred out to bed search and has been medically cleared. Follow-up Information    None         Patient's Medications   Discharge Prescriptions    No medications on file       No discharge procedures on file.     PDMP Review       Value Time User    PDMP Reviewed  Yes 3/1/2023 11:33 AM Lorelei Tello PA-C          ED Provider  Electronically Signed by           Uriel Bach DO  08/02/23 [id number]

## 2023-08-04 LAB
25(OH)D3 SERPL-MCNC: 49.7 NG/ML (ref 30–100)
ALBUMIN SERPL BCP-MCNC: 4 G/DL (ref 3.5–5)
ALP SERPL-CCNC: 87 U/L (ref 34–104)
ALT SERPL W P-5'-P-CCNC: 19 U/L (ref 7–52)
ANION GAP SERPL CALCULATED.3IONS-SCNC: 9 MMOL/L
AST SERPL W P-5'-P-CCNC: 13 U/L (ref 13–39)
ATRIAL RATE: 70 BPM
BASOPHILS # BLD AUTO: 0.09 THOUSANDS/ÂΜL (ref 0–0.1)
BASOPHILS NFR BLD AUTO: 1 % (ref 0–1)
BILIRUB SERPL-MCNC: 0.43 MG/DL (ref 0.2–1)
BUN SERPL-MCNC: 23 MG/DL (ref 5–25)
CALCIUM SERPL-MCNC: 9.3 MG/DL (ref 8.4–10.2)
CHLORIDE SERPL-SCNC: 108 MMOL/L (ref 96–108)
CHOLEST SERPL-MCNC: 185 MG/DL
CO2 SERPL-SCNC: 22 MMOL/L (ref 21–32)
CREAT SERPL-MCNC: 0.76 MG/DL (ref 0.6–1.3)
EOSINOPHIL # BLD AUTO: 0.39 THOUSAND/ÂΜL (ref 0–0.61)
EOSINOPHIL NFR BLD AUTO: 3 % (ref 0–6)
ERYTHROCYTE [DISTWIDTH] IN BLOOD BY AUTOMATED COUNT: 13.4 % (ref 11.6–15.1)
EST. AVERAGE GLUCOSE BLD GHB EST-MCNC: 128 MG/DL
FOLATE SERPL-MCNC: >22.3 NG/ML
GFR SERPL CREATININE-BSD FRML MDRD: 99 ML/MIN/1.73SQ M
GLUCOSE P FAST SERPL-MCNC: 107 MG/DL (ref 65–99)
GLUCOSE SERPL-MCNC: 107 MG/DL (ref 65–140)
HBA1C MFR BLD: 6.1 %
HCT VFR BLD AUTO: 48.3 % (ref 34.8–46.1)
HDLC SERPL-MCNC: 53 MG/DL
HGB BLD-MCNC: 15.1 G/DL (ref 11.5–15.4)
IMM GRANULOCYTES # BLD AUTO: 0.04 THOUSAND/UL (ref 0–0.2)
IMM GRANULOCYTES NFR BLD AUTO: 0 % (ref 0–2)
LDLC SERPL CALC-MCNC: 113 MG/DL (ref 0–100)
LYMPHOCYTES # BLD AUTO: 4.11 THOUSANDS/ÂΜL (ref 0.6–4.47)
LYMPHOCYTES NFR BLD AUTO: 35 % (ref 14–44)
MCH RBC QN AUTO: 28.8 PG (ref 26.8–34.3)
MCHC RBC AUTO-ENTMCNC: 31.3 G/DL (ref 31.4–37.4)
MCV RBC AUTO: 92 FL (ref 82–98)
MONOCYTES # BLD AUTO: 0.85 THOUSAND/ÂΜL (ref 0.17–1.22)
MONOCYTES NFR BLD AUTO: 7 % (ref 4–12)
NEUTROPHILS # BLD AUTO: 6.27 THOUSANDS/ÂΜL (ref 1.85–7.62)
NEUTS SEG NFR BLD AUTO: 54 % (ref 43–75)
NONHDLC SERPL-MCNC: 132 MG/DL
NRBC BLD AUTO-RTO: 0 /100 WBCS
P AXIS: 37 DEGREES
PLATELET # BLD AUTO: 346 THOUSANDS/UL (ref 149–390)
PMV BLD AUTO: 9.7 FL (ref 8.9–12.7)
POTASSIUM SERPL-SCNC: 4 MMOL/L (ref 3.5–5.3)
PR INTERVAL: 180 MS
PROT SERPL-MCNC: 7.1 G/DL (ref 6.4–8.4)
QRS AXIS: 4 DEGREES
QRSD INTERVAL: 98 MS
QT INTERVAL: 398 MS
QTC INTERVAL: 429 MS
RBC # BLD AUTO: 5.24 MILLION/UL (ref 3.81–5.12)
SODIUM SERPL-SCNC: 139 MMOL/L (ref 135–147)
T WAVE AXIS: 22 DEGREES
TRIGL SERPL-MCNC: 95 MG/DL
VENTRICULAR RATE: 70 BPM
VIT B12 SERPL-MCNC: 512 PG/ML (ref 180–914)
WBC # BLD AUTO: 11.75 THOUSAND/UL (ref 4.31–10.16)

## 2023-08-04 PROCEDURE — 80053 COMPREHEN METABOLIC PANEL: CPT | Performed by: PSYCHIATRY & NEUROLOGY

## 2023-08-04 PROCEDURE — 82746 ASSAY OF FOLIC ACID SERUM: CPT | Performed by: PSYCHIATRY & NEUROLOGY

## 2023-08-04 PROCEDURE — 83036 HEMOGLOBIN GLYCOSYLATED A1C: CPT

## 2023-08-04 PROCEDURE — 80061 LIPID PANEL: CPT | Performed by: PSYCHIATRY & NEUROLOGY

## 2023-08-04 PROCEDURE — 93005 ELECTROCARDIOGRAM TRACING: CPT

## 2023-08-04 PROCEDURE — 85025 COMPLETE CBC W/AUTO DIFF WBC: CPT | Performed by: PSYCHIATRY & NEUROLOGY

## 2023-08-04 PROCEDURE — 93010 ELECTROCARDIOGRAM REPORT: CPT | Performed by: INTERNAL MEDICINE

## 2023-08-04 PROCEDURE — 82306 VITAMIN D 25 HYDROXY: CPT | Performed by: PSYCHIATRY & NEUROLOGY

## 2023-08-04 PROCEDURE — 82607 VITAMIN B-12: CPT | Performed by: PSYCHIATRY & NEUROLOGY

## 2023-08-04 PROCEDURE — 99223 1ST HOSP IP/OBS HIGH 75: CPT | Performed by: HOSPITALIST

## 2023-08-04 RX ORDER — FLUTICASONE PROPIONATE 50 MCG
2 SPRAY, SUSPENSION (ML) NASAL 2 TIMES DAILY
Status: DISCONTINUED | OUTPATIENT
Start: 2023-08-04 | End: 2023-08-07 | Stop reason: HOSPADM

## 2023-08-04 RX ORDER — PAROXETINE HYDROCHLORIDE 20 MG/1
40 TABLET, FILM COATED ORAL DAILY
Status: DISCONTINUED | OUTPATIENT
Start: 2023-08-05 | End: 2023-08-07 | Stop reason: HOSPADM

## 2023-08-04 RX ADMIN — LORATADINE 10 MG: 10 TABLET ORAL at 09:22

## 2023-08-04 RX ADMIN — CELECOXIB 200 MG: 100 CAPSULE ORAL at 18:00

## 2023-08-04 RX ADMIN — CARIPRAZINE 3 MG: 1.5 CAPSULE, GELATIN COATED ORAL at 17:56

## 2023-08-04 RX ADMIN — PROPRANOLOL HYDROCHLORIDE 20 MG: 20 TABLET ORAL at 09:22

## 2023-08-04 RX ADMIN — PANTOPRAZOLE SODIUM 40 MG: 40 TABLET, DELAYED RELEASE ORAL at 05:06

## 2023-08-04 RX ADMIN — PROPRANOLOL HYDROCHLORIDE 20 MG: 20 TABLET ORAL at 21:28

## 2023-08-04 RX ADMIN — PAROXETINE 30 MG: 20 TABLET, FILM COATED ORAL at 09:22

## 2023-08-04 RX ADMIN — TOPIRAMATE 150 MG: 25 TABLET, FILM COATED ORAL at 18:02

## 2023-08-04 RX ADMIN — LEVOTHYROXINE SODIUM 125 MCG: 25 TABLET ORAL at 05:06

## 2023-08-04 RX ADMIN — LUBIPROSTONE 8 MCG: 8 CAPSULE, GELATIN COATED ORAL at 09:21

## 2023-08-04 RX ADMIN — Medication 2 SPRAY: at 18:02

## 2023-08-04 RX ADMIN — METFORMIN HYDROCHLORIDE 500 MG: 500 TABLET, FILM COATED ORAL at 09:21

## 2023-08-04 RX ADMIN — CYCLOSPORINE 1 DROP: 0 SOLUTION/ DROPS OPHTHALMIC; TOPICAL at 17:56

## 2023-08-04 RX ADMIN — DOCUSATE SODIUM 100 MG: 100 CAPSULE, LIQUID FILLED ORAL at 18:01

## 2023-08-04 RX ADMIN — GABAPENTIN 600 MG: 600 TABLET, FILM COATED ORAL at 09:26

## 2023-08-04 RX ADMIN — TOPIRAMATE 150 MG: 25 TABLET, FILM COATED ORAL at 09:21

## 2023-08-04 RX ADMIN — FLUTICASONE PROPIONATE 2 SPRAY: 50 SPRAY, METERED NASAL at 09:26

## 2023-08-04 RX ADMIN — Medication 3 MG: at 21:28

## 2023-08-04 RX ADMIN — CELECOXIB 200 MG: 100 CAPSULE ORAL at 09:21

## 2023-08-04 RX ADMIN — SPIRONOLACTONE 50 MG: 50 TABLET ORAL at 09:22

## 2023-08-04 RX ADMIN — BUPROPION HYDROCHLORIDE 300 MG: 150 TABLET, EXTENDED RELEASE ORAL at 09:22

## 2023-08-04 RX ADMIN — GABAPENTIN 600 MG: 600 TABLET, FILM COATED ORAL at 18:03

## 2023-08-04 RX ADMIN — LUBIPROSTONE 8 MCG: 8 CAPSULE, GELATIN COATED ORAL at 21:28

## 2023-08-04 RX ADMIN — DOCUSATE SODIUM 100 MG: 100 CAPSULE, LIQUID FILLED ORAL at 09:22

## 2023-08-04 NOTE — NURSING NOTE
Patient asleep all night, no disrupted sleep noted. Respirations easy & nonlabored, no distress noted. Q 7 minute checks maintained.

## 2023-08-04 NOTE — MALNUTRITION/BMI
This medical record reflects one or more clinical indicators suggestive of malnutrition and/or morbid obesity. BMI Findings:  Adult BMI Classifications: Morbid Obesity 60-69.9      Energy intake > energy output over time. Patient follows closely with weight management. Body mass index is 60.14 kg/m². See Nutrition note dated 8/4/2023 for additional details. Completed nutrition assessment is viewable in the nutrition documentation.
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
## 2023-08-04 NOTE — PROGRESS NOTES
08/04/23 0930   Activity/Group Checklist   Group   (Self Assessment and Relapse Prevention Planning)   Attendance Attended   Attendance Duration (min) 16-30   Interactions Interacted appropriately   Affect/Mood Appropriate;Calm   Goals Achieved Identified feelings; Identified triggers; Identified relapse prevention strategies; Discussed coping strategies; Identified resources and support systems; Able to listen to others; Able to engage in interactions; Able to reflect/comment on own behavior;Able to manage/cope with feelings; Able to self-disclose     AT met with pt to complete self assessment and relapse prevention planning. PT displayed a blunted mood and affect at start, however did brighten throughout conversation. PT was pleasant and cooperative and was able to maintain good eye contact. PT identified some of her biggest stressors leading to this admission as financial concerns, relationship stressors and grief processing. PT reports having lost her father to Covid. PT's greatest current challenge includes building better relationships with family and friends. What pt likes most about life is her animals and being out in nature and making art. PT reported that she has not done any painting for years so she tried it this morning and stated that she enjoyed the process. What pt likes least about life is her depression and anxiety. PT completed high school and some college. PT reports that she is currently unemployed. PT enjoys making art, playing with her dogs, spending time with others and watching movies. PT would like to learn more about building better relationships, expressing feelings and emotions and community support and resources. PT identified her life partner and her animals as her current support system.  PT has been attending groups

## 2023-08-04 NOTE — PROGRESS NOTES
Progress Note - Jose Miguel Waddell 44 y.o. female MRN: [id number]    Unit/Bed#: [facility] 245-01 Encounter: [id number]        Subjective:   Patient seen and examined at bedside after reviewing the chart and discussing the case with the caring staff. Patient examined at bedside. Patient complaining of nasal congestion. She denies headache today. She denies any other complaints. Physical Exam   Vitals: Blood pressure 113/50, pulse 91, temperature 97.5 °F (36.4 °C), temperature source Temporal, resp. rate 18, height 5' 7" (1.702 m), weight (!) 174 kg (384 lb), last menstrual period 07/26/2023, SpO2 95 %. ,Body mass index is 60.14 kg/m². Constitutional: Patient in no acute distress. HEENT: PERR, EOMI, MMM. Cardiovascular: Normal rate and regular rhythm. Pulmonary/Chest: Effort normal and breath sounds normal.   Abdomen: Soft, + BS, NT. Assessment/Plan:  Jose Miguel Waddell is a(n) 44 y.o. female with MDD.     1. Hypothyroidism. Continue levothyroxine 125 mcg daily. TSH WNL on 8/2/23. 2.  Constipation. Amitiza 8 mcg twice daily (linzess nonformulary), colace 100 mg twice daily, Miralax and Senokot S as needed. 3.  Allergic rhinitis. Patient is on Claritin 10 mg daily, increase Flonase nasal spray to twice daily. 4.  GERD. Protonix 40 mg daily (omeprazole nonformulary). 5.  Chronic headaches/migraines. Continue Topamax 150 mg twice daily, propranolol 20 mg twice daily, Imitrex 100 mg as needed. Emgality nonformulary. Patient follows with [facility] neurology on outpatient basis. 6.  Chronic back pain. Patient is on gabapentin 600 mg twice daily, Celebrex 200 mg twice daily. 7.  Dry eyes. Continue cyclosporine eye drops (brought from home). 8.  Morbid obesity/prediabetes. Ozempic nonformulary-pt will try to have someone bring from home. Continue metformin 500 mg daily. Hgb a1c 6.1% on 8/4/2023.  9.  Hyperaldosteronism. Continue spironolactone 50 mg daily.      The patient was discussed with Dr. Nasir Whitman and he is in agreement with the above note.

## 2023-08-04 NOTE — SOCIAL WORK
Pt signed RUTH for [facility] [address] [facility] [phone]. Sw contacted [facility] to notify of pt's admission, progress update provided. F: [phone].

## 2023-08-04 NOTE — PROGRESS NOTES
08/04/23 1000   Activity/Group Checklist   Group   (Art Therapy Creative Expression and Processing)   Attendance Attended   Attendance Duration (min) Greater than 60   Interactions Interacted appropriately   Affect/Mood Appropriate;Calm   Goals Achieved Identified feelings; Identified relapse prevention strategies; Identified medication adherence strategies; Discussed discharge plans; Discussed self-esteem issues; Increased hopefulness; Displayed empathy;Identified distorted thoughts/beliefs; Identified resources and support systems

## 2023-08-04 NOTE — PROGRESS NOTES
23   Team Meeting   Meeting Type Daily Rounds   Team Members Present   Team Members Present Physician;Nurse;   Physician Team Member Dr. Tony Hunter MD; Eder [place]; Dr. Rick Ernandez MD; Dr. Franklin Tapia MD   Nursing Team Member Ellen Gutierrez RN   Social Work Team Member Magalie [place]; [place], [place]   Patient/Family Present   Patient Present No   Patient's Family Present No     New 11, MDD, anorexic, on ozempic, found father   due to covid, mother lives in assisted living, hx head aches,

## 2023-08-04 NOTE — TREATMENT PLAN
TREATMENT PLAN REVIEW - [facility] UNIQUE Stack 44 y.o. 1985 female MRN: [id number]    [address] [address] Room / Bed: [place] [address]/[facility] 674-74 Encounter: [id number]          Admit Date/Time:  8/3/2023 12:37 PM    Treatment Team: Attending Provider: Shakila Guerra MD; : Lizzy Dunbar; Patient Care Assistant: Benji Miramontes; Care Manager: Forbes Opitz, RN; Patient Care Assistant: Rosas Poole; Recreational Therapist: Zeenat Donnelly; Registered Nurse: Bindu Maria RN; Patient Care Technician: Radha Comer    Diagnosis: Active Problems: There are no active Hospital Problems.       Patient Strengths/Assets: average or above intelligence, cooperative, communication skills, patient is on a voluntary commitment    Patient Barriers/Limitations: poor physical health, self-care deficit    Short Term Goals: decrease in depressive symptoms, decrease in anxiety symptoms, ability to stay safe on the unit, improvement in insight    Long Term Goals: resolution of depressive symptoms, free of suicidal thoughts, improved insight, adequate self care    Progress Towards Goals: starting psychiatric medications as prescribed    Recommended Treatment: medication management, patient medication education, group therapy, milieu therapy, continued Behavioral Health psychiatric evaluation/assessment process    Treatment Frequency: daily medication monitoring, group and milieu therapy daily, monitoring through interdisciplinary rounds, monitoring through weekly patient care conferences    Expected Discharge Date:  8/11/23    Discharge Plan: referrals as indicated    Treatment Plan Created/Updated By: Shakila Guerra MD
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
## 2023-08-04 NOTE — PROGRESS NOTES
08/04/23 0730   Activity/Group Checklist   Group   [employer])   Attendance Attended   Attendance Duration (min) 46-60   Interactions Interacted appropriately   Affect/Mood Appropriate;Calm;Constricted   Goals Achieved Able to listen to others

## 2023-08-04 NOTE — NURSING NOTE
Patient is medication compliant this shift. Patient attended the dinning room for dinner and snack. Patient has been in her room in bed off and on since her admission. Patient is cooperative and pleasant upon approach. Patient states she is just tired and depressed today and wants to sleep. Patient denies SI,HI,AVH at this time.

## 2023-08-04 NOTE — SOCIAL WORK
Sw, provider met with pt in small group room, pt discussed hx, medications, stressors. Pt agreeable to med adjustments, will be monitored over weekend with possible Monday dc pending progress. Pt agreeable to in person php referral for Berna. Lizzy Patterson (Life Partner) [phone] contacted regarding possible Monday DC, update provided on pt progress, dc, php referral. No concerns regarding possible Monday dc, sw to confirm with Michael Moore Monday. [address] [place] [address] [phone] contacted via .

## 2023-08-04 NOTE — PROGRESS NOTES
Progress Note - [address] [facility] Ext A Stack 44 y.o. female MRN: [id number]  Unit/Bed#: [facility] 245-01 Encounter: [id number]    Assessment/Plan   Active Problems: There are no active Hospital Problems. Behavior over the last 24 hours:  unchanged  Sleep: normal  Appetite: normal  Medication side effects: No  ROS: headache    Subjective: she is asking about dc, states she is leaving on Monday and will then go to [facility] but she is glad she came inpatient to get her medications adjusted quicker and she plans on going to [facility] for headache specialist care in late August. Tolerating the increase in Paxil so far. Per RN report she slept and has been out on the unit participating. Mental Status Evaluation:  Appearance:  casually dressed and overweight   Behavior:  cooperative   Speech:  normal pitch and normal volume   Mood:  anxious   Affect:  mood-congruent   Thought Process:  logical   Associations: intact associations   Thought Content:  normal   Perceptual Disturbances: None   Risk Potential: Suicidal Ideations none      Sensorium:  person, place and situation   Memory:  recent and remote memory grossly intact   Consciousness:  alert and awake    Attention: attention span and concentration were age appropriate   Insight:  fair   Judgment: fair   Gait/Station: normal gait/station   Motor Activity: no abnormal movements     Progress Toward Goals: new admission, tolerating increase of Paxil so far    Recommended Treatment: Continue with group therapy, milieu therapy and occupational therapy. Risks, benefits and possible side effects of Medications:   Risks, benefits, and possible side effects of medications explained to patient and patient verbalizes understanding.       Medications:   all current active meds have been reviewed and current meds:   Current Facility-Administered Medications   Medication Dose Route Frequency   • acetaminophen (TYLENOL) tablet 650 mg  650 mg Oral Q6H PRN   • acetaminophen (TYLENOL) tablet 650 mg  650 mg Oral Q4H PRN   • acetaminophen (TYLENOL) tablet 975 mg  975 mg Oral Q6H PRN   • aluminum-magnesium hydroxide-simethicone (MAALOX) oral suspension 30 mL  30 mL Oral Q4H PRN   • haloperidol lactate (HALDOL) injection 2.5 mg  2.5 mg Intramuscular Q4H PRN Max 4/day    And   • LORazepam (ATIVAN) injection 1 mg  1 mg Intramuscular Q4H PRN Max 4/day    And   • benztropine (COGENTIN) injection 0.5 mg  0.5 mg Intramuscular Q4H PRN Max 4/day   • haloperidol lactate (HALDOL) injection 5 mg  5 mg Intramuscular Q4H PRN Max 4/day    And   • LORazepam (ATIVAN) injection 2 mg  2 mg Intramuscular Q4H PRN Max 4/day    And   • benztropine (COGENTIN) injection 1 mg  1 mg Intramuscular Q4H PRN Max 4/day   • benztropine (COGENTIN) tablet 1 mg  1 mg Oral Q4H PRN Max 6/day   • bisacodyl (DULCOLAX) rectal suppository 10 mg  10 mg Rectal Daily PRN   • buPROPion (WELLBUTRIN XL) 24 hr tablet 300 mg  300 mg Oral Daily   • cariprazine (VRAYLAR) capsule 3 mg  3 mg Oral QPM   • cariprazine (VRAYLAR) capsule 6 mg  6 mg Oral Daily   • celecoxib (CeleBREX) capsule 200 mg  200 mg Oral BID   • cycloSPORINE (PF) 0.09 % SOLN 1 drop  1 drop Ophthalmic BID   • hydrOXYzine HCL (ATARAX) tablet 50 mg  50 mg Oral Q6H PRN Max 4/day    Or   • diphenhydrAMINE (BENADRYL) injection 50 mg  50 mg Intramuscular Q6H PRN   • docusate sodium (COLACE) capsule 100 mg  100 mg Oral BID   • fluticasone (FLONASE) 50 mcg/act nasal spray 2 spray  2 spray Each Nare BID   • gabapentin (NEURONTIN) tablet 600 mg  600 mg Oral BID   • haloperidol (HALDOL) tablet 1 mg  1 mg Oral Q6H PRN   • haloperidol (HALDOL) tablet 2.5 mg  2.5 mg Oral Q4H PRN Max 4/day   • haloperidol (HALDOL) tablet 5 mg  5 mg Oral Q4H PRN Max 4/day   • hydrOXYzine HCL (ATARAX) tablet 100 mg  100 mg Oral Q6H PRN Max 4/day    Or   • LORazepam (ATIVAN) injection 2 mg  2 mg Intramuscular Q6H PRN   • hydrOXYzine HCL (ATARAX) tablet 25 mg  25 mg Oral Q6H PRN Max 4/day   • levothyroxine tablet 125 mcg  125 mcg Oral Early Morning   • loratadine (CLARITIN) tablet 10 mg  10 mg Oral Daily   • lubiprostone (AMITIZA) capsule 8 mcg  8 mcg Oral BID   • melatonin tablet 3 mg  3 mg Oral HS   • metFORMIN (GLUCOPHAGE) tablet 500 mg  500 mg Oral Daily With Breakfast   • pantoprazole (PROTONIX) EC tablet 40 mg  40 mg Oral Early Morning   • PARoxetine (PAXIL) tablet 40 mg  40 mg Oral Daily   • polyethylene glycol (MIRALAX) packet 17 g  17 g Oral Daily PRN   • propranolol (INDERAL) tablet 20 mg  20 mg Oral Q12H [address]   • semaglutide (0.25 or 0.5 mg/dose) (Ozempic) injection pen 0.2533 mg  0.2533 mg Subcutaneous Q7 Days   • senna-docusate sodium (SENOKOT S) 8.6-50 mg per tablet 1 tablet  1 tablet Oral Daily PRN   • spironolactone (ALDACTONE) tablet 50 mg  50 mg Oral Daily   • SUMAtriptan (IMITREX) tablet 100 mg  100 mg Oral BID PRN   • topiramate (TOPAMAX) tablet 150 mg  150 mg Oral BID   • traZODone (DESYREL) tablet 50 mg  50 mg Oral HS PRN   . Labs: I have personally reviewed all pertinent laboratory/tests results.    Most Recent Labs:   Lab Results   Component Value Date    WBC 11.75 (H) 08/04/2023    RBC 5.24 (H) 08/04/2023    HGB 15.1 08/04/2023    HCT 48.3 (H) 08/04/2023     08/04/2023    RDW 13.4 08/04/2023    NEUTROABS 6.27 08/04/2023    SODIUM 139 08/04/2023    K 4.0 08/04/2023     08/04/2023    CO2 22 08/04/2023    BUN 23 08/04/2023    CREATININE 0.76 08/04/2023    GLUC 107 08/04/2023    GLUF 107 (H) 08/04/2023    CALCIUM 9.3 08/04/2023    AST 13 08/04/2023    ALT 19 08/04/2023    ALKPHOS 87 08/04/2023    TP 7.1 08/04/2023    ALB 4.0 08/04/2023    TBILI 0.43 08/04/2023    CHOLESTEROL 185 08/04/2023    HDL 53 08/04/2023    TRIG 95 08/04/2023    LDLCALC 113 (H) 08/04/2023    NONHDLC 132 08/04/2023    LITHIUM 0.6 01/11/2018    JAK5VQWDNGRZ 1.665 08/02/2023    FREET4 0.87 09/14/2022    PREGSERUM Negative 01/09/2018    HCGQUANT <2 06/01/2022    RPR Non-Reactive 01/24/2019    HGBA1C 6.1 (H) 08/04/2023     08/04/2023     Wt Readings from Last 3 Encounters:   08/05/23 (!) 176 kg (387 lb)   08/02/23 (!) 176 kg (389 lb)   07/20/23 (!) 177 kg (389 lb 6.4 oz)     Temp Readings from Last 3 Encounters:   08/05/23 (!) 97.2 °F (36.2 °C) (Temporal)   08/02/23 (!) 97 °F (36.1 °C) (Tympanic)   07/20/23 98 °F (36.7 °C)     BP Readings from Last 3 Encounters:   08/05/23 103/79   08/03/23 108/57   07/20/23 130/78     Pulse Readings from Last 3 Encounters:   08/05/23 93   08/03/23 75   07/20/23 96       Counseling / Coordination of Care  Total floor / unit time spent today 20 minutes. Greater than 50% of total time was spent with the patient and / or family counseling and / or coordination of care.  A description of the counseling / coordination of care: medications

## 2023-08-04 NOTE — PROGRESS NOTES
08/04/23 1101   Team Meeting   Meeting Type Tx Team Meeting   Team Members Present   Team Members Present Physician;Nurse;   Physician Team Member Dr. Tony Hunter MD   Nursing Team Member Ellen Gutierrez RN   Social Work Team Member Donal Mejias   Patient/Family Present   Patient Present Yes   Patient's Family Present No     Patient and treatment team met and reviewed pt strengths, limitations, coping skills, treatment plan and goals; pt agreeable and signed; copy on chart

## 2023-08-04 NOTE — SOCIAL WORK
[address] [facility]    Name and Date of Birth:  Sabino Alford 44 y.o. 1985    Date of Referral: August 4, 2023    Presenting Symptoms and Stressors:      Symptoms:  depressive symptoms, anxiety, obsessive thoughts and unstable mood  Stressors:  family problems, death of family member (father in 2021) and chronic pain    Access to Weapons:  No    Smoking Status: denies use    Substance Use:  None    Suicidal Ideation: None at present    Homicidal Ideation: None    Depressed Mood: depressed mood, helplessness, low energy, ruminations    Rebecca/Hypomania: None    Psychosis: None    Agitation: No    Appetite Changes: normal appetite    Sleep Disturbance: normal sleep    Diagnoses:  1.  Major Depressive Disorder, single, moderate    Current Psychiatrist or Therapist:    Psychiatrist: Dr. Fuchs Counter  Therapist: Samina Sawyer they Require Ambulatory Assistance: No    Communication Assistance: not required     Legal Issues: None        Gabby Metcalf

## 2023-08-04 NOTE — NURSING NOTE
Pt visible on unit. Social with select peers. Denies active SI, HI, or hallucinations. Verbalizes increasing depression regarding physical health and physical symptoms. Compliant with medications as prescribed. Safety precautions maintained. Will continue to monitor and assess.

## 2023-08-05 PROCEDURE — 99231 SBSQ HOSP IP/OBS SF/LOW 25: CPT | Performed by: PSYCHIATRY & NEUROLOGY

## 2023-08-05 RX ORDER — TOPIRAMATE 100 MG/1
150 TABLET, FILM COATED ORAL 2 TIMES DAILY
Qty: 270 TABLET | Refills: 0 | Status: SHIPPED | OUTPATIENT
Start: 2023-08-05

## 2023-08-05 RX ORDER — SPIRONOLACTONE 50 MG/1
50 TABLET, FILM COATED ORAL DAILY
Qty: 90 TABLET | Refills: 0 | Status: SHIPPED | OUTPATIENT
Start: 2023-08-05

## 2023-08-05 RX ORDER — SUMATRIPTAN 100 MG/1
TABLET, FILM COATED ORAL
Qty: 12 TABLET | Refills: 0 | Status: SHIPPED | OUTPATIENT
Start: 2023-08-05

## 2023-08-05 RX ORDER — OMEPRAZOLE 20 MG/1
20 CAPSULE, DELAYED RELEASE ORAL 2 TIMES DAILY
Qty: 180 CAPSULE | Refills: 0 | Status: SHIPPED | OUTPATIENT
Start: 2023-08-05

## 2023-08-05 RX ORDER — FLUTICASONE PROPIONATE 50 MCG
2 SPRAY, SUSPENSION (ML) NASAL 2 TIMES DAILY
Qty: 16 G | Refills: 0 | Status: SHIPPED | OUTPATIENT
Start: 2023-08-05

## 2023-08-05 RX ORDER — CYCLOSPORINE 0 G/ML
1 SOLUTION/ DROPS OPHTHALMIC; TOPICAL 2 TIMES DAILY
Qty: 60 EACH | Refills: 0 | Status: SHIPPED | OUTPATIENT
Start: 2023-08-05

## 2023-08-05 RX ORDER — DOCUSATE SODIUM 100 MG/1
100 CAPSULE, LIQUID FILLED ORAL 2 TIMES DAILY
Qty: 60 CAPSULE | Refills: 0 | Status: SHIPPED | OUTPATIENT
Start: 2023-08-05

## 2023-08-05 RX ORDER — LORATADINE 10 MG/1
10 TABLET ORAL DAILY
Qty: 30 TABLET | Refills: 0 | Status: SHIPPED | OUTPATIENT
Start: 2023-08-05

## 2023-08-05 RX ORDER — GABAPENTIN 600 MG/1
600 TABLET ORAL 2 TIMES DAILY
Qty: 60 TABLET | Refills: 0 | Status: SHIPPED | OUTPATIENT
Start: 2023-08-05

## 2023-08-05 RX ORDER — LINACLOTIDE 290 UG/1
290 CAPSULE, GELATIN COATED ORAL DAILY
Qty: 90 CAPSULE | Refills: 0 | Status: SHIPPED | OUTPATIENT
Start: 2023-08-05 | End: 2023-11-03

## 2023-08-05 RX ORDER — LEVOTHYROXINE SODIUM 0.12 MG/1
125 TABLET ORAL
Qty: 30 TABLET | Refills: 0 | Status: SHIPPED | OUTPATIENT
Start: 2023-08-06

## 2023-08-05 RX ORDER — LANOLIN ALCOHOL/MO/W.PET/CERES
3 CREAM (GRAM) TOPICAL
Qty: 30 TABLET | Refills: 0 | Status: SHIPPED | OUTPATIENT
Start: 2023-08-05

## 2023-08-05 RX ADMIN — CYCLOSPORINE 1 DROP: 0 SOLUTION/ DROPS OPHTHALMIC; TOPICAL at 18:26

## 2023-08-05 RX ADMIN — LUBIPROSTONE 8 MCG: 8 CAPSULE, GELATIN COATED ORAL at 20:57

## 2023-08-05 RX ADMIN — CYCLOSPORINE 1 DROP: 0 SOLUTION/ DROPS OPHTHALMIC; TOPICAL at 09:45

## 2023-08-05 RX ADMIN — DOCUSATE SODIUM 100 MG: 100 CAPSULE, LIQUID FILLED ORAL at 18:25

## 2023-08-05 RX ADMIN — METFORMIN HYDROCHLORIDE 500 MG: 500 TABLET, FILM COATED ORAL at 09:47

## 2023-08-05 RX ADMIN — PROPRANOLOL HYDROCHLORIDE 20 MG: 20 TABLET ORAL at 20:57

## 2023-08-05 RX ADMIN — PANTOPRAZOLE SODIUM 40 MG: 40 TABLET, DELAYED RELEASE ORAL at 05:50

## 2023-08-05 RX ADMIN — SPIRONOLACTONE 50 MG: 50 TABLET ORAL at 09:38

## 2023-08-05 RX ADMIN — CELECOXIB 200 MG: 100 CAPSULE ORAL at 18:25

## 2023-08-05 RX ADMIN — LUBIPROSTONE 8 MCG: 8 CAPSULE, GELATIN COATED ORAL at 09:38

## 2023-08-05 RX ADMIN — PROPRANOLOL HYDROCHLORIDE 20 MG: 20 TABLET ORAL at 09:38

## 2023-08-05 RX ADMIN — Medication 2 SPRAY: at 18:38

## 2023-08-05 RX ADMIN — TOPIRAMATE 150 MG: 25 TABLET, FILM COATED ORAL at 18:25

## 2023-08-05 RX ADMIN — LORATADINE 10 MG: 10 TABLET ORAL at 09:38

## 2023-08-05 RX ADMIN — CELECOXIB 200 MG: 100 CAPSULE ORAL at 09:37

## 2023-08-05 RX ADMIN — CARIPRAZINE 3 MG: 1.5 CAPSULE, GELATIN COATED ORAL at 18:25

## 2023-08-05 RX ADMIN — GABAPENTIN 600 MG: 600 TABLET, FILM COATED ORAL at 09:37

## 2023-08-05 RX ADMIN — TOPIRAMATE 150 MG: 25 TABLET, FILM COATED ORAL at 09:37

## 2023-08-05 RX ADMIN — Medication 3 MG: at 20:58

## 2023-08-05 RX ADMIN — CARIPRAZINE 6 MG: 4.5 CAPSULE, GELATIN COATED ORAL at 09:37

## 2023-08-05 RX ADMIN — BUPROPION HYDROCHLORIDE 300 MG: 150 TABLET, EXTENDED RELEASE ORAL at 09:38

## 2023-08-05 RX ADMIN — PAROXETINE 40 MG: 20 TABLET, FILM COATED ORAL at 09:38

## 2023-08-05 RX ADMIN — DOCUSATE SODIUM 100 MG: 100 CAPSULE, LIQUID FILLED ORAL at 09:38

## 2023-08-05 RX ADMIN — Medication 2 SPRAY: at 09:45

## 2023-08-05 RX ADMIN — LEVOTHYROXINE SODIUM 125 MCG: 25 TABLET ORAL at 05:50

## 2023-08-05 RX ADMIN — GABAPENTIN 600 MG: 600 TABLET, FILM COATED ORAL at 18:25

## 2023-08-05 NOTE — NURSING NOTE
Patient observed sleeping during shift. No acute behaviors overnight. No change in medical condition. Maintained on q 7 minute safety checks. Will continue to monitor.

## 2023-08-05 NOTE — PLAN OF CARE
Problem: Risk for Self Injury/Neglect  Goal: Treatment Goal: Remain safe during length of stay, learn and adopt new coping skills, and be free of self-injurious ideation, impulses and acts at the time of discharge  Outcome: Progressing  Goal: Verbalize thoughts and feelings  Description: Interventions:  - Assess and re-assess patient's lethality and potential for self-injury  - Engage patient in 1:1 interactions, daily, for a minimum of 15 minutes  - Encourage patient to express feelings, fears, frustrations, hopes  - Establish rapport/trust with patient   Outcome: Progressing  Goal: Refrain from harming self  Description: Interventions:  - Monitor patient closely, per order  - Develop a trusting relationship  - Supervise medication ingestion, monitor effects and side effects   Outcome: Progressing     Problem: Depression  Goal: Treatment Goal: Demonstrate behavioral control of depressive symptoms, verbalize feelings of improved mood/affect, and adopt new coping skills prior to discharge  Outcome: Progressing  Goal: Verbalize thoughts and feelings  Description: Interventions:  - Assess and re-assess patient's level of risk   - Engage patient in 1:1 interactions, daily, for a minimum of 15 minutes   - Encourage patient to express feelings, fears, frustrations, hopes   Outcome: Progressing  Goal: Refrain from isolation  Description: Interventions:  - Develop a trusting relationship   - Encourage socialization   Outcome: Progressing  Goal: Refrain from self-neglect  Outcome: Progressing     Problem: Anxiety  Goal: Anxiety is at manageable level  Description: Interventions:  - Assess and monitor patient's anxiety level. - Monitor for signs and symptoms (heart palpitations, chest pain, shortness of breath, headaches, nausea, feeling jumpy, restlessness, irritable, apprehensive). - Collaborate with interdisciplinary team and initiate plan and interventions as ordered.   - [place] patient to unit/surroundings  - Explain treatment plan  - Encourage participation in care  - Encourage verbalization of concerns/fears  - Identify coping mechanisms  - Assist in developing anxiety-reducing skills  - Administer/offer alternative therapies  - Limit or eliminate stimulants  Outcome: Progressing

## 2023-08-05 NOTE — NURSING NOTE
Out in community. Socialization was good. Positive for evening snack and medications Depression and anxiety denied. Appetite was good. No suicidal or homicidal ideations. Safety maintained via clutter-free environment, ID band, and given non-skid socks. No change in medical condition. Hygiene remains good. .  Medication education given. Care Plan to be reviewed and amended. Maintained on q 7 minute checks. Will continue to monitor.

## 2023-08-05 NOTE — PROGRESS NOTES
Progress Note - Maddie Modi 44 y.o. female MRN: [id number]    Unit/Bed#: [facility] 245-01 Encounter: [id number]        Subjective:   Patient seen and examined at bedside after reviewing the chart and discussing the case with the caring staff. Patient examined at bedside. Patient complaining of obstipation. Patient used to take Linzess at home. Patient is being discharged on Monday, 8/7/2023. Patient is requesting all her prescriptions. I reviewed and reconciled patient's problem list and medications. Physical Exam   Vitals: Blood pressure 103/79, pulse 93, temperature (!) 97.2 °F (36.2 °C), temperature source Temporal, resp. rate 18, height 5' 7" (1.702 m), weight (!) 176 kg (387 lb), last menstrual period 07/26/2023, SpO2 98 %. ,Body mass index is 60.61 kg/m². Constitutional: Patient in no acute distress. HEENT: PERR, EOMI, MMM. Cardiovascular: Normal rate and regular rhythm. Pulmonary/Chest: Effort normal and breath sounds normal.   Abdomen: Soft, + BS, NT. Assessment/Plan:  Maddie Modi is a(n) 44 y.o. female with MDD.     Medical clearance. Patient is medically cleared for discharge. All scripts will be sent out for the patient. 1.  Hypothyroidism. Continue levothyroxine 125 mcg daily. TSH WNL on 8/2/23. 2.  Constipation. Amitiza 8 mcg twice daily (linzess nonformulary), colace 100 mg twice daily, Miralax and Senokot S as needed. 3.  Allergic rhinitis. Patient is on Claritin 10 mg daily, increase Flonase nasal spray to twice daily. 4.  GERD. Protonix 40 mg daily (omeprazole nonformulary). 5.  Chronic headaches/migraines. Continue Topamax 150 mg twice daily, propranolol 20 mg twice daily, Imitrex 100 mg as needed. Emgality nonformulary. Patient follows with [facility] neurology on outpatient basis. 6.  Chronic back pain. Patient is on gabapentin 600 mg twice daily, Celebrex 200 mg twice daily. 7.  Dry eyes. Continue cyclosporine eye drops (brought from home).   8. Morbid obesity/prediabetes. Ozempic nonformulary-pt will try to have someone bring from home. Continue metformin 500 mg daily. Hgb a1c 6.1% on 8/4/2023.  9.  Hyperaldosteronism. Continue spironolactone 50 mg daily.

## 2023-08-06 PROCEDURE — 99231 SBSQ HOSP IP/OBS SF/LOW 25: CPT | Performed by: PSYCHIATRY & NEUROLOGY

## 2023-08-06 RX ADMIN — Medication 3 MG: at 20:51

## 2023-08-06 RX ADMIN — METFORMIN HYDROCHLORIDE 500 MG: 500 TABLET, FILM COATED ORAL at 09:48

## 2023-08-06 RX ADMIN — SPIRONOLACTONE 50 MG: 50 TABLET ORAL at 09:44

## 2023-08-06 RX ADMIN — PAROXETINE 40 MG: 20 TABLET, FILM COATED ORAL at 09:44

## 2023-08-06 RX ADMIN — CARIPRAZINE 3 MG: 1.5 CAPSULE, GELATIN COATED ORAL at 18:00

## 2023-08-06 RX ADMIN — BUPROPION HYDROCHLORIDE 300 MG: 150 TABLET, EXTENDED RELEASE ORAL at 09:44

## 2023-08-06 RX ADMIN — LUBIPROSTONE 8 MCG: 8 CAPSULE, GELATIN COATED ORAL at 20:51

## 2023-08-06 RX ADMIN — DOCUSATE SODIUM 100 MG: 100 CAPSULE, LIQUID FILLED ORAL at 09:43

## 2023-08-06 RX ADMIN — Medication 2 SPRAY: at 18:00

## 2023-08-06 RX ADMIN — CYCLOSPORINE 1 DROP: 0 SOLUTION/ DROPS OPHTHALMIC; TOPICAL at 18:00

## 2023-08-06 RX ADMIN — CARIPRAZINE 6 MG: 4.5 CAPSULE, GELATIN COATED ORAL at 09:43

## 2023-08-06 RX ADMIN — GABAPENTIN 600 MG: 600 TABLET, FILM COATED ORAL at 09:43

## 2023-08-06 RX ADMIN — TOPIRAMATE 150 MG: 25 TABLET, FILM COATED ORAL at 09:44

## 2023-08-06 RX ADMIN — TOPIRAMATE 150 MG: 25 TABLET, FILM COATED ORAL at 18:00

## 2023-08-06 RX ADMIN — LEVOTHYROXINE SODIUM 125 MCG: 25 TABLET ORAL at 06:13

## 2023-08-06 RX ADMIN — CELECOXIB 200 MG: 100 CAPSULE ORAL at 09:43

## 2023-08-06 RX ADMIN — PROPRANOLOL HYDROCHLORIDE 20 MG: 20 TABLET ORAL at 20:52

## 2023-08-06 RX ADMIN — PANTOPRAZOLE SODIUM 40 MG: 40 TABLET, DELAYED RELEASE ORAL at 06:13

## 2023-08-06 RX ADMIN — DOCUSATE SODIUM 100 MG: 100 CAPSULE, LIQUID FILLED ORAL at 18:00

## 2023-08-06 RX ADMIN — CELECOXIB 200 MG: 100 CAPSULE ORAL at 18:00

## 2023-08-06 RX ADMIN — LUBIPROSTONE 8 MCG: 8 CAPSULE, GELATIN COATED ORAL at 09:44

## 2023-08-06 RX ADMIN — LORATADINE 10 MG: 10 TABLET ORAL at 09:44

## 2023-08-06 RX ADMIN — Medication 2 SPRAY: at 09:43

## 2023-08-06 RX ADMIN — GABAPENTIN 600 MG: 600 TABLET, FILM COATED ORAL at 18:00

## 2023-08-06 RX ADMIN — CYCLOSPORINE 1 DROP: 0 SOLUTION/ DROPS OPHTHALMIC; TOPICAL at 09:43

## 2023-08-06 RX ADMIN — PROPRANOLOL HYDROCHLORIDE 20 MG: 20 TABLET ORAL at 09:44

## 2023-08-06 NOTE — NURSING NOTE
Administered 50 mg atarax po prn for moderate anxiety related roommate yelling. Brothers scale 18. Pt able to engage in conversation with RN and utilize coping skills in addition to prn medication. Will evaluate effectiveness.

## 2023-08-06 NOTE — NURSING NOTE
Patient observed sleeping comfortably for majority of q7 minute monitoring throughout the night, without demonstrating any signs or symptoms of distress. No acute behaviors overnight. Non-labored breathing noted. Will remain on safety precautions and continual q7 minute monitoring.

## 2023-08-06 NOTE — PROGRESS NOTES
Progress Note - 631 [facility] Ext A Stack 44 y.o. female MRN: [id number]  Unit/Bed#: [facility] 245-01 Encounter: [id number]    The patient was seen for continuing care and reviewed with treatment team.  She appreciates the acknowledgement of her [employer] shirt and continues to hope for discharge tomorrow. Current Mental Status Evaluation:  Appearance:  Adequate hygiene and grooming   Behavior:  Calm and Cooperative   Mood:  Anxious   Affect: mood-congruent   Speech: Normal volume and Normal rate   Thought Process:  Goal directed and coherent   Thought Content:  Does not verbalize delusional material   Perceptual Disturbances: Denies hallucinations and does not appear to be responding to internal stimuli   Risk Potential: No suicidal or homicidal ideation   Orientation:   Person, place, situation     Wt Readings from Last 3 Encounters:   08/05/23 (!) 176 kg (387 lb)   08/02/23 (!) 176 kg (389 lb)   07/20/23 (!) 177 kg (389 lb 6.4 oz)     Temp Readings from Last 3 Encounters:   08/06/23 98.3 °F (36.8 °C) (Temporal)   08/02/23 (!) 97 °F (36.1 °C) (Tympanic)   07/20/23 98 °F (36.7 °C)     BP Readings from Last 3 Encounters:   08/06/23 102/69   08/03/23 108/57   07/20/23 130/78     Pulse Readings from Last 3 Encounters:   08/06/23 69   08/03/23 75   07/20/23 96       Progress Toward Goals: No significant events in the past 24 hours  Active Problems: There are no active Hospital Problems. Discharge planning update:Disposition to be determined    Recommended Treatment: Continue with pharmacotherapy, group therapy, milieu therapy and occupational therapy.   The patient will be maintained on the following medications:  Current Facility-Administered Medications   Medication Dose Route Frequency Provider Last Rate    acetaminophen  650 mg Oral Q6H PRN Jori Bumpers, MD      acetaminophen  650 mg Oral Q4H PRN Jori Bumpers, MD      acetaminophen  975 mg Oral Q6H PRN Jori Bumpers, MD      aluminum-magnesium hydroxide-simethicone  30 mL Oral Q4H PRN Josi Valerio, MD      haloperidol lactate  2.5 mg Intramuscular Q4H PRN Max 4/day Josi Valerio MD      And    LORazepam  1 mg Intramuscular Q4H PRN Max 4/day Josi Valerio MD      And    benztropine  0.5 mg Intramuscular Q4H PRN Max 4/day Josi Valerio MD      haloperidol lactate  5 mg Intramuscular Q4H PRN Max 4/day Josi Valerio MD      And    LORazepam  2 mg Intramuscular Q4H PRN Max 4/day Josi Valerio MD      And    benztropine  1 mg Intramuscular Q4H PRN Max 4/day Josi Valerio MD      benztropine  1 mg Oral Q4H PRN Max 6/day Josi Valerio, MD      bisacodyl  10 mg Rectal Daily PRN Josi Valerio MD      buPROPion  300 mg Oral Daily Josi Valerio MD      cariprazine  3 mg Oral QPM Shakila Guerra MD      cariprazine  6 mg Oral Daily Shakila Guerra MD      celecoxib  200 mg Oral BID Josi Valerio MD      cycloSPORINE (PF)  1 drop Ophthalmic BID Teresa L Dagnalmony, PA-C      hydrOXYzine HCL  50 mg Oral Q6H PRN Max 4/day Josi Valerio MD      Or    diphenhydrAMINE  50 mg Intramuscular Q6H PRN Josi Valerio MD      docusate sodium  100 mg Oral BID Teresa L Dagnall PA-C      fluticasone  2 spray Each Nare BID Teresalouise Ames PA-C      gabapentin  600 mg Oral BID Josi Valerio MD      haloperidol  1 mg Oral Q6H PRN Josi Valerio MD      haloperidol  2.5 mg Oral Q4H PRN Max 4/day Josi Valerio MD      haloperidol  5 mg Oral Q4H PRN Max 4/day Josi Valerio MD      hydrOXYzine HCL  100 mg Oral Q6H PRN Max 4/day Josi Valerio MD      Or    LORazepam  2 mg Intramuscular Q6H PRN Josi Valerio MD      hydrOXYzine HCL  25 mg Oral Q6H PRN Max 4/day Josi Valerio MD      levothyroxine  125 mcg Oral Early Morning Josi Valerio MD      loratadine  10 mg Oral Daily Josi Valerio MD      lubiprostone  8 mcg Oral BID TeresaJARVIS Vital-C      melatonin  3 mg Oral HS Josi Valerio MD metFORMIN  500 mg Oral Daily With Breakfast Chris Scott MD      pantoprazole  40 mg Oral Early Morning Teresa Ames PA-C      PARoxetine  40 mg Oral Daily Severiano Dustman, MD      polyethylene glycol  17 g Oral Daily PRN Chris Scott MD      propranolol  20 mg Oral Q12H [address] Chris Scott MD      semaglutide (0.25 or 0.5 mg/dose)  0.2533 mg Subcutaneous Q7 Days Chris Scott MD      senna-docusate sodium  1 tablet Oral Daily PRN Chris Scott MD      spironolactone  50 mg Oral Daily Chris Scott MD      SUMAtriptan  100 mg Oral BID PRN Teresa Aems PA-C      topiramate  150 mg Oral BID Chris Scott MD      traZODone  50 mg Oral HS PRN Chris Scott MD

## 2023-08-06 NOTE — NURSING NOTE
Previously administered atarax effective. Pt currently visible in dining room conversing with peers. Will continue to monitor and assess.

## 2023-08-06 NOTE — PROGRESS NOTES
Progress Note - Zack Joya 44 y.o. female MRN: [id number]    Unit/Bed#: [facility] 245-01 Encounter: [id number]        Subjective:   Patient seen and examined at bedside after reviewing the chart and discussing the case with the caring staff. Patient examined at bedside. Patient complaining of obstipation. Patient used to take Linzess at home. Patient is being discharged on Monday, 8/7/2023. Patient is requesting all her prescriptions. I reviewed and reconciled patient's problem list and medications. Physical Exam   Vitals: Blood pressure 120/62, pulse 69, temperature 98.6 °F (37 °C), temperature source Temporal, resp. rate 16, height 5' 7" (1.702 m), weight (!) 176 kg (387 lb), last menstrual period 07/26/2023, SpO2 98 %. ,Body mass index is 60.61 kg/m². Constitutional: Patient in no acute distress. HEENT: PERR, EOMI, MMM. Cardiovascular: Normal rate and regular rhythm. Pulmonary/Chest: Effort normal and breath sounds normal.   Abdomen: Soft, + BS, NT. Assessment/Plan:  Zack Joya is a(n) 44 y.o. female with MDD.     Medical clearance. Patient is medically cleared for discharge. All scripts will be sent out for the patient. 1.  Hypothyroidism. Continue levothyroxine 125 mcg daily. TSH WNL on 8/2/23. 2.  Constipation. Amitiza 8 mcg twice daily (linzess nonformulary), colace 100 mg twice daily, Miralax and Senokot S as needed. 3.  Allergic rhinitis. Patient is on Claritin 10 mg daily, increase Flonase nasal spray to twice daily. 4.  GERD. Protonix 40 mg daily (omeprazole nonformulary). 5.  Chronic headaches/migraines. Continue Topamax 150 mg twice daily, propranolol 20 mg twice daily, Imitrex 100 mg as needed. Emgality nonformulary. Patient follows with [facility] neurology on outpatient basis. 6.  Chronic back pain. Patient is on gabapentin 600 mg twice daily, Celebrex 200 mg twice daily. 7.  Dry eyes. Continue cyclosporine eye drops (brought from home).   8. Morbid obesity/prediabetes. Ozempic nonformulary-pt will try to have someone bring from home. Continue metformin 500 mg daily. Hgb a1c 6.1% on 8/4/2023.  9.  Hyperaldosteronism. Continue spironolactone 50 mg daily.

## 2023-08-06 NOTE — NURSING NOTE
Pt visible on unit. Social with peers. Actively engaged in group and unit activities. Verbalizes feeling ready for discharge tomorrow. Identifies fiance as support system once discharged. Pt also two flores retrievers as support animals. [facility] and will continue therapy. Positive outlook and plan once discharged. Verbalizes improvement in depression and anxiety since admission pt states, "I just needed a little reset" Denies SI. Able to communicate needs. Offers no complaints at this time. Safety precautions maintained. Will continue to monitor and assess.

## 2023-08-06 NOTE — NURSING NOTE
Pleasant and cooperative with staff. Compliant with medication. Visible on the unit and social with peers and staff. Bright on approach. Endorsed mild to moderate depression. Eye contact good. Affect appropriate to circumstance. Speech pattern normal and relaxed. Denied SI, HI, AVH, or anxiety. Pt resting in her room. 7 minute safety checks continued.

## 2023-08-06 NOTE — PLAN OF CARE
Problem: Depression  Goal: Verbalize thoughts and feelings  Description: Interventions:  - Assess and re-assess patient's level of risk   - Engage patient in 1:1 interactions, daily, for a minimum of 15 minutes   - Encourage patient to express feelings, fears, frustrations, hopes   Outcome: Progressing  Goal: Refrain from isolation  Description: Interventions:  - Develop a trusting relationship   - Encourage socialization   Outcome: Progressing     Problem: Anxiety  Goal: Anxiety is at manageable level  Description: Interventions:  - Assess and monitor patient's anxiety level. - Monitor for signs and symptoms (heart palpitations, chest pain, shortness of breath, headaches, nausea, feeling jumpy, restlessness, irritable, apprehensive). - Collaborate with interdisciplinary team and initiate plan and interventions as ordered.   - [place] patient to unit/surroundings  - Explain treatment plan  - Encourage participation in care  - Encourage verbalization of concerns/fears  - Identify coping mechanisms  - Assist in developing anxiety-reducing skills  - Administer/offer alternative therapies  - Limit or eliminate stimulants  Outcome: Progressing

## 2023-08-07 VITALS
HEART RATE: 73 BPM | WEIGHT: 293 LBS | DIASTOLIC BLOOD PRESSURE: 73 MMHG | BODY MASS INDEX: 45.99 KG/M2 | RESPIRATION RATE: 16 BRPM | HEIGHT: 67 IN | OXYGEN SATURATION: 97 % | SYSTOLIC BLOOD PRESSURE: 118 MMHG | TEMPERATURE: 98.1 F

## 2023-08-07 PROBLEM — F32.A DEPRESSION: Status: ACTIVE | Noted: 2023-08-07

## 2023-08-07 PROCEDURE — 99239 HOSP IP/OBS DSCHRG MGMT >30: CPT | Performed by: HOSPITALIST

## 2023-08-07 RX ORDER — PAROXETINE HYDROCHLORIDE 40 MG/1
40 TABLET, FILM COATED ORAL DAILY
Qty: 30 TABLET | Refills: 0 | Status: SHIPPED | OUTPATIENT
Start: 2023-08-08 | End: 2023-09-07

## 2023-08-07 RX ADMIN — CARIPRAZINE 6 MG: 4.5 CAPSULE, GELATIN COATED ORAL at 09:13

## 2023-08-07 RX ADMIN — CELECOXIB 200 MG: 100 CAPSULE ORAL at 09:13

## 2023-08-07 RX ADMIN — GABAPENTIN 600 MG: 600 TABLET, FILM COATED ORAL at 09:17

## 2023-08-07 RX ADMIN — BUPROPION HYDROCHLORIDE 300 MG: 150 TABLET, EXTENDED RELEASE ORAL at 09:13

## 2023-08-07 RX ADMIN — PAROXETINE 40 MG: 20 TABLET, FILM COATED ORAL at 09:13

## 2023-08-07 RX ADMIN — SPIRONOLACTONE 50 MG: 50 TABLET ORAL at 09:14

## 2023-08-07 RX ADMIN — Medication 2 SPRAY: at 09:17

## 2023-08-07 RX ADMIN — LEVOTHYROXINE SODIUM 125 MCG: 25 TABLET ORAL at 05:10

## 2023-08-07 RX ADMIN — LORATADINE 10 MG: 10 TABLET ORAL at 09:14

## 2023-08-07 RX ADMIN — TOPIRAMATE 150 MG: 25 TABLET, FILM COATED ORAL at 09:13

## 2023-08-07 RX ADMIN — CYCLOSPORINE 1 DROP: 0 SOLUTION/ DROPS OPHTHALMIC; TOPICAL at 09:17

## 2023-08-07 RX ADMIN — LUBIPROSTONE 8 MCG: 8 CAPSULE, GELATIN COATED ORAL at 09:12

## 2023-08-07 RX ADMIN — DOCUSATE SODIUM 100 MG: 100 CAPSULE, LIQUID FILLED ORAL at 09:13

## 2023-08-07 RX ADMIN — PROPRANOLOL HYDROCHLORIDE 20 MG: 20 TABLET ORAL at 09:13

## 2023-08-07 RX ADMIN — PANTOPRAZOLE SODIUM 40 MG: 40 TABLET, DELAYED RELEASE ORAL at 05:10

## 2023-08-07 RX ADMIN — METFORMIN HYDROCHLORIDE 500 MG: 500 TABLET, FILM COATED ORAL at 09:13

## 2023-08-07 NOTE — NURSING NOTE
Pleasant and cooperative with staff. Compliant with medication. Visible on unit and social with staff and peers. Denied SI, HI, AVH, or anxiety. Endorsed mild to moderate depression. Affect appropriate to circumstance. Eye contact fair. Speech pattern normal and relaxed. Pt resting in her room. 7 minute safety checks continued.

## 2023-08-07 NOTE — PROGRESS NOTES
Progress Note - Ashutosh Smith 44 y.o. female MRN: [id number]    Unit/Bed#: [facility] 245-01 Encounter: [id number]        Subjective:   Patient seen and examined at bedside after reviewing the chart and discussing the case with the caring staff. Patient examined at bedside. Patient complaining of obstipation. Patient used to take Linzess at home. Patient is being discharged today. Patient is requesting all her prescriptions. I reviewed and reconciled patient's problem list and medications. Physical Exam   Vitals: Blood pressure 118/73, pulse 73, temperature 98.1 °F (36.7 °C), temperature source Temporal, resp. rate 16, height 5' 7" (1.702 m), weight (!) 176 kg (387 lb), last menstrual period 07/26/2023, SpO2 97 %. ,Body mass index is 60.61 kg/m². Constitutional: Patient in no acute distress. HEENT: PERR, EOMI, MMM. Cardiovascular: Normal rate and regular rhythm. Pulmonary/Chest: Effort normal and breath sounds normal.   Abdomen: Soft, + BS, NT. Assessment/Plan:  Ashutosh Smith is a(n) 44 y.o. female with MDD.     Medical clearance. Patient is medically cleared for discharge. All scripts will be sent out for the patient. 1.  Hypothyroidism. Continue levothyroxine 125 mcg daily. TSH WNL on 8/2/23. 2.  Constipation. Amitiza 8 mcg twice daily (linzess nonformulary), colace 100 mg twice daily, Miralax and Senokot S as needed. 3.  Allergic rhinitis. Patient is on Claritin 10 mg daily, increase Flonase nasal spray to twice daily. 4.  GERD. Protonix 40 mg daily (omeprazole nonformulary). 5.  Chronic headaches/migraines. Continue Topamax 150 mg twice daily, propranolol 20 mg twice daily, Imitrex 100 mg as needed. Emgality nonformulary. Patient follows with [facility] neurology on outpatient basis. 6.  Chronic back pain. Patient is on gabapentin 600 mg twice daily, Celebrex 200 mg twice daily. 7.  Dry eyes. Continue cyclosporine eye drops (brought from home).   8.  Morbid obesity/prediabetes. Ozempic nonformulary-pt will try to have someone bring from home. Continue metformin 500 mg daily. Hgb a1c 6.1% on 8/4/2023.  9.  Hyperaldosteronism. Continue spironolactone 50 mg daily.

## 2023-08-07 NOTE — NURSING NOTE
Pt was appropriate, controlled and cooperative all shift. Pt denied ah, vh, si, hi. Pt reported no anxiety or depression during the shift and was excited to discharge to her girlfriend. Pt was compliant with meals and medications, denies any pain or complaints/concerns. Continuous visual safety checks performed staggered q7 minutes. All safety precautions are in place. No acute concerns at this time. Plan of care continues.

## 2023-08-07 NOTE — PLAN OF CARE
Problem: Ineffective Coping  Goal: Cooperates with admission process  Description: Interventions:   - Complete admission process  Outcome: Completed  Goal: Identifies healthy coping skills  Outcome: Completed  Goal: Demonstrates healthy coping skills  Outcome: Completed  Goal: Participates in unit activities  Description: Interventions:  - Provide therapeutic environment   - Provide required programming   - Redirect inappropriate behaviors   Outcome: Completed     Problem: Risk for Self Injury/Neglect  Goal: Treatment Goal: Remain safe during length of stay, learn and adopt new coping skills, and be free of self-injurious ideation, impulses and acts at the time of discharge  Outcome: Completed  Goal: Verbalize thoughts and feelings  Description: Interventions:  - Assess and re-assess patient's lethality and potential for self-injury  - Engage patient in 1:1 interactions, daily, for a minimum of 15 minutes  - Encourage patient to express feelings, fears, frustrations, hopes  - Establish rapport/trust with patient   Outcome: Completed  Goal: Refrain from harming self  Description: Interventions:  - Monitor patient closely, per order  - Develop a trusting relationship  - Supervise medication ingestion, monitor effects and side effects   Outcome: Completed     Problem: Depression  Goal: Treatment Goal: Demonstrate behavioral control of depressive symptoms, verbalize feelings of improved mood/affect, and adopt new coping skills prior to discharge  Outcome: Completed  Goal: Verbalize thoughts and feelings  Description: Interventions:  - Assess and re-assess patient's level of risk   - Engage patient in 1:1 interactions, daily, for a minimum of 15 minutes   - Encourage patient to express feelings, fears, frustrations, hopes   Outcome: Completed  Goal: Refrain from isolation  Description: Interventions:  - Develop a trusting relationship   - Encourage socialization   Outcome: Completed  Goal: Refrain from self-neglect  Outcome: Completed     Problem: Anxiety  Goal: Anxiety is at manageable level  Description: Interventions:  - Assess and monitor patient's anxiety level. - Monitor for signs and symptoms (heart palpitations, chest pain, shortness of breath, headaches, nausea, feeling jumpy, restlessness, irritable, apprehensive). - Collaborate with interdisciplinary team and initiate plan and interventions as ordered.   - [place] patient to unit/surroundings  - Explain treatment plan  - Encourage participation in care  - Encourage verbalization of concerns/fears  - Identify coping mechanisms  - Assist in developing anxiety-reducing skills  - Administer/offer alternative therapies  - Limit or eliminate stimulants  Outcome: Completed     Problem: Ineffective Coping  Goal: Participates in unit activities  Description: Interventions:  - Provide therapeutic environment   - Provide required programming   - Redirect inappropriate behaviors   Outcome: Completed     Problem: DISCHARGE PLANNING - CARE MANAGEMENT  Goal: Discharge to post-acute care or home with appropriate resources  Description: INTERVENTIONS:  - Conduct assessment to determine patient/family and health care team treatment goals, and need for post-acute services based on payer coverage, community resources, and patient preferences, and barriers to discharge  - Address psychosocial, clinical, and financial barriers to discharge as identified in assessment in conjunction with the patient/family and health care team  - Arrange appropriate level of post-acute services according to patient’s   needs and preference and payer coverage in collaboration with the physician and health care team  - Communicate with and update the patient/family, physician, and health care team regarding progress on the discharge plan  - Arrange appropriate transportation to post-acute venues  Outcome: Completed     Problem: Nutrition/Hydration-ADULT  Goal: Nutrient/Hydration intake appropriate for improving, restoring or maintaining nutritional needs  Description: Monitor and assess patient's nutrition/hydration status for malnutrition. Collaborate with interdisciplinary team and initiate plan and interventions as ordered. Monitor patient's weight and dietary intake as ordered or per policy. Utilize nutrition screening tool and intervene as necessary. Determine patient's food preferences and provide high-protein, high-caloric foods as appropriate.      INTERVENTIONS:  - Monitor oral intake, urinary output, labs, and treatment plans  - Assess nutrition and hydration status and recommend course of action  - Evaluate amount of meals eaten  - Assist patient with eating if necessary   - Allow adequate time for meals  - Recommend/ encourage appropriate diets, oral nutritional supplements, and vitamin/mineral supplements  - Order, calculate, and assess calorie counts as needed  - Recommend, monitor, and adjust tube feedings and TPN/PPN based on assessed needs  - Assess need for intravenous fluids  - Provide specific nutrition/hydration education as appropriate  - Include patient/family/caregiver in decisions related to nutrition  Outcome: Completed

## 2023-08-07 NOTE — SOCIAL WORK
VM left for [facility] regarding pt's dc today. Teresa Garcia ([facility]) [phone] notified of pt's dc, no concerns voiced regarding pt dc, will pick pt up tody around 3pm. Pharm confirmed Cheryl Armstrong. IMM explained to pt who expressed verbal confirmation of understanding. Signed IMM in scan bin to be entered into pt's EHR, copy provided to pt. No request to appeal dc, agreeable to dc plan.

## 2023-08-07 NOTE — NURSING NOTE
Pt discharged with the following belongings:    Pt Room:    1 x black/gray camo shorts  1 x navy underwear  1 x black socks    Storage:    2 x spiral notebooks  2 x pens  1 x grey bag  1 x blue bag- filled with misc clothing/items  1 x red bag-filled with misc clothing/items  1 x clear hospital bag  From previous hospital    Security:    Went over contents of  [place] with Kavitha Stern and patient.       Went over all with Pt & signed

## 2023-08-07 NOTE — DISCHARGE SUMMARY
Discharge Summary - [facility] Kaiden CALHOUN Stack 44 y.o. female MRN: [id number]  Unit/Bed#: U 245-01 Encounter: [id number]     Admission Date: 8/3/2023         Discharge Date: 8/7/23    Attending Psychiatrist: Nancy Selby MD    Reason for Admission/HPI: Major depressive disorder, single episode, unspecified [F32.9]  Depression [F32. A]    Patient is a 44 y.o. female presented with history of major depressive disorder, generalized anxiety disorder and OCD to [facility] ED on August 2 with complaints of headache and need for psychiatric evaluation. She was seen in the ED and with a psychiatric evaluation she complained of passive thoughts of death, increased stressors and agreed to sign a 201 for a voluntary admission to the inpatient psychiatric unit. On initial evaluation patient reported chronic headaches which were her major stressor. She related increasing depression during the times where she had increased headache and at times felt worthless and hopeless due to her incapacity from headaches. She reported having thoughts of self harm or wish for death and dying when her headaches had exacerbated and caused her to go to the ED. She states she did have wish for death while in the ED however denied any thoughts or wish for death at time of evaluation in the behavioral health unit. Furthermore she added she does have chronic depression with exacerbation during headache however denied thoughts of  self-harm chronically when her headaches are worse. There is no evidence of psychosis or celia    Hospital Course: The patient was admitted to the inpatient psychiatric unit and started on every 7 minutes precautions. During the hospitalization the patient was attending individual therapy, group therapy, milieu therapy and occupational therapy. Patient's outpatient psychiatric medication Wellbutrin  mg daily, Vraylar 3 mg in the evening and 6 mg daily, gabapentin 600 mg twice a day, were continued. Patient was also on paroxetine 30 mg daily which was increased to 40 mg daily on admission. Throughout hospitalization she denied any thoughts of self-harm or others. She reported feeling more stable and more in control of her stressors. Was able to identify her headaches as a trigger. Denied any active thoughts of self-harm or others or passive wish for death. Was able to identify her headaches as a trigger. Patient's symptoms improved gradually over the hospital course. At the end of treatment the patient was doing well. Mood was stable at the time of discharge. The patient denied suicidal ideation, intent or plan at the time of discharge and denied homicidal ideation, intent or plan at the time of discharge. There was no overt psychosis at the time of discharge. Sleep and appetite were improved. The patient was tolerating medications and was not reporting any significant side effects at the time of discharge. Since the patient was doing well at the end of the hospitalization, treatment team felt that the patient could be safely discharged to outpatient care.        Mental Status at time of Discharge:     Appearance:  casually dressed, marginal hygiene, looks stated age   Behavior:  calm   Speech:  normal rate, normal volume   Mood:  euthymic   Affect:  normal range and intensity   Language: naming objects   Thought Process:  linear   Associations: circumstantial associations   Thought Content:  no overt delusions   Perceptual Disturbances: no auditory hallucinations, no visual hallucinations   Risk Potential: Suicidal ideation - None  Homicidal ideation - None  Potential for aggression - No   Sensorium:  oriented to person, place and time/date   Memory:  recent and remote memory grossly intact   Consciousness:  alert and awake   Attention: decreased concentration and decreased attention span   Intellect: within normal limits   Fund of Knowledge: awareness of current events: yes   Insight:  fair Judgment: fair   Muscle Strength Muscle Tone: normal  normal   Gait/Station: normal gait/station   Motor Activity: no abnormal movements       Admission Diagnosis:Major depressive disorder, single episode, unspecified [F32.9]  Depression [F32. A]    Discharge Diagnosis:   Principal Problem:    Depression  Resolved Problems:    * No resolved hospital problems.  *        Lab results:  Admission on 08/03/2023   Component Date Value   • Sodium 08/04/2023 139    • Potassium 08/04/2023 4.0    • Chloride 08/04/2023 108    • CO2 08/04/2023 22    • ANION GAP 08/04/2023 9    • BUN 08/04/2023 23    • Creatinine 08/04/2023 0.76    • Glucose 08/04/2023 107    • Glucose, Fasting 08/04/2023 107 (H)    • Calcium 08/04/2023 9.3    • AST 08/04/2023 13    • ALT 08/04/2023 19    • Alkaline Phosphatase 08/04/2023 87    • Total Protein 08/04/2023 7.1    • Albumin 08/04/2023 4.0    • Total Bilirubin 08/04/2023 0.43    • eGFR 08/04/2023 99    • WBC 08/04/2023 11.75 (H)    • RBC 08/04/2023 5.24 (H)    • Hemoglobin 08/04/2023 15.1    • Hematocrit 08/04/2023 48.3 (H)    • MCV 08/04/2023 92    • MCH 08/04/2023 28.8    • MCHC 08/04/2023 31.3 (L)    • RDW 08/04/2023 13.4    • MPV 08/04/2023 9.7    • Platelets 46/80/7484 346    • nRBC 08/04/2023 0    • Neutrophils Relative 08/04/2023 54    • Immat GRANS % 08/04/2023 0    • Lymphocytes Relative 08/04/2023 35    • Monocytes Relative 08/04/2023 7    • Eosinophils Relative 08/04/2023 3    • Basophils Relative 08/04/2023 1    • Neutrophils Absolute 08/04/2023 6.27    • Immature Grans Absolute 08/04/2023 0.04    • Lymphocytes Absolute 08/04/2023 4.11    • Monocytes Absolute 08/04/2023 0.85    • Eosinophils Absolute 08/04/2023 0.39    • Basophils Absolute 08/04/2023 0.09    • Vitamin B-12 08/04/2023 512    • Folate 08/04/2023 >22.3    • Vit D, 25-Hydroxy 08/04/2023 49.7    • Cholesterol 08/04/2023 185    • Triglycerides 08/04/2023 95    • HDL, Direct 08/04/2023 53    • LDL Calculated 08/04/2023 113 (H) • Non-HDL-Chol (CHOL-HDL) 08/04/2023 132    • Hemoglobin A1C 08/04/2023 6.1 (H)    • EAG 08/04/2023 128    • Ventricular Rate 08/04/2023 70    • Atrial Rate 08/04/2023 70    • WA Interval 08/04/2023 180    • QRSD Interval 08/04/2023 98    • QT Interval 08/04/2023 398    • QTC Interval 08/04/2023 429    • P Axis 08/04/2023 37    • QRS Axis 08/04/2023 4    • T Wave Axis 08/04/2023 22        Discharge Medications:  Current Discharge Medication List      START taking these medications    Details   docusate sodium (COLACE) 100 mg capsule Take 1 capsule (100 mg total) by mouth 2 (two) times a day  Qty: 60 capsule, Refills: 0    Associated Diagnoses: Constipation      melatonin 3 mg Take 1 tablet (3 mg total) by mouth daily at bedtime  Qty: 30 tablet, Refills: 0    Associated Diagnoses: Insomnia            Current Discharge Medication List      STOP taking these medications       Cholecalciferol (VITAMIN D3) 5000 units CAPS Comments:   Reason for Stopping:         folic acid (FOLVITE) 1 mg tablet Comments:   Reason for Stopping:         furosemide (LASIX) 40 mg tablet Comments:   Reason for Stopping:         LORazepam (ATIVAN) 0.5 mg tablet Comments:   Reason for Stopping:         loteprednol etabonate (LOTEMAX) 0.5 % ophthalmic suspension Comments:   Reason for Stopping:         Nurtec 75 MG TBDP Comments:   Reason for Stopping:         tiZANidine (ZANAFLEX) 2 mg tablet Comments:   Reason for Stopping:         Vyvanse 70 MG capsule Comments:   Reason for Stopping:              Current Discharge Medication List      CONTINUE these medications which have CHANGED    Details   Cequa 0.09 % SOLN Apply 1 drop to eye 2 (two) times a day  Qty: 60 each, Refills: 0    Associated Diagnoses: Dry eyes      fluticasone (FLONASE) 50 mcg/act nasal spray 2 sprays into each nostril 2 (two) times a day  Qty: 16 g, Refills: 0    Associated Diagnoses:  Allergic rhinitis, unspecified seasonality, unspecified trigger      gabapentin (Neurontin) 600 MG tablet Take 1 tablet (600 mg total) by mouth 2 (two) times a day  Qty: 60 tablet, Refills: 0    Associated Diagnoses: Neuropathy      levothyroxine 125 mcg tablet Take 1 tablet (125 mcg total) by mouth daily in the early morning Do not start before August 6, 2023. Qty: 30 tablet, Refills: 0    Associated Diagnoses: Acquired hypothyroidism      linaCLOtide (Linzess) 290 MCG CAPS Take 1 capsule by mouth daily  Qty: 90 capsule, Refills: 0    Associated Diagnoses: Chronic constipation      loratadine (CLARITIN) 10 mg tablet Take 1 tablet (10 mg total) by mouth daily  Qty: 30 tablet, Refills: 0    Associated Diagnoses: Allergic rhinitis, unspecified seasonality, unspecified trigger      metFORMIN (GLUCOPHAGE) 500 mg tablet Take 1 tablet (500 mg total) by mouth daily with breakfast  Qty: 90 tablet, Refills: 0    Associated Diagnoses: Abnormal weight gain; IFG (impaired fasting glucose); Binge-eating disorder, severe; Morbid (severe) obesity due to excess calories (HCC)      omeprazole (PriLOSEC) 20 mg delayed release capsule Take 1 capsule (20 mg total) by mouth 2 (two) times a day  Qty: 180 capsule, Refills: 0    Associated Diagnoses: Gastroesophageal reflux disease without esophagitis      PARoxetine (PAXIL) 40 MG tablet Take 1 tablet (40 mg total) by mouth daily Do not start before August 8, 2023. Qty: 30 tablet, Refills: 0    Associated Diagnoses: EILEEN (generalized anxiety disorder); Depression      semaglutide, 0.25 or 0.5 mg/dose, (Ozempic, 0.25 or 0.5 MG/DOSE,) 2 mg/3 mL injection pen Inject 0.25mg subcutaneously once weekly for 4 weeks and then increase to 0.5mg once weekly  Qty: 3 mL, Refills: 0    Associated Diagnoses: IFG (impaired fasting glucose);  Morbid obesity (HCC)      spironolactone (ALDACTONE) 50 mg tablet Take 1 tablet (50 mg total) by mouth daily  Qty: 90 tablet, Refills: 0    Associated Diagnoses: Hypertension, unspecified type      SUMAtriptan (IMITREX) 100 mg tablet 1 tab as needed for migraine, can repeat once in 2 hours, max 200mg in 24 hours. Qty: 12 tablet, Refills: 0    Associated Diagnoses: Chronic migraine without aura without status migrainosus, not intractable      topiramate (TOPAMAX) 100 mg tablet Take 1.5 tablets (150 mg total) by mouth 2 (two) times a day  Qty: 270 tablet, Refills: 0    Associated Diagnoses: Intractable chronic migraine without aura and without status migrainosus            Current Discharge Medication List      CONTINUE these medications which have NOT CHANGED    Details   buPROPion (WELLBUTRIN XL) 300 mg 24 hr tablet Take 300 mg by mouth daily      celecoxib (CeleBREX) 200 mg capsule take 1 capsule by mouth twice a day  Qty: 60 capsule, Refills: 4    Associated Diagnoses: Chronic migraine without aura without status migrainosus, not intractable      Galcanezumab-gnlm (Emgality) 120 MG/ML SOAJ Inject 120 mg under the skin every 30 (thirty) days  Qty: 3 mL, Refills: 3    Associated Diagnoses: Chronic migraine without aura without status migrainosus, not intractable      Multiple Vitamin (MULTIVITAMIN) capsule Take 1 capsule by mouth daily      propranolol (INDERAL) 20 mg tablet Take 20 mg by mouth every 12 (twelve) hours      !! Vraylar 3 MG capsule Take 3 mg by mouth daily Daily in the evening      !! Vraylar 6 MG capsule Take 6 mg by mouth daily Daily in the morning       !! - Potential duplicate medications found. Please discuss with provider. Discharge instructions/Information to patient and family:   See after visit summary for information provided to patient and family. Provisions for Follow-Up Care:  See after visit summary for information related to follow-up care and any pertinent home health orders. Discharge Statement   I spent 45 minutes discharging the patient. This time was spent on the day of discharge. I had direct contact with the patient on the day of discharge.  Additional documentation is required if more than 30 minutes were spent on discharge.

## 2023-08-07 NOTE — PLAN OF CARE
Problem: Risk for Self Injury/Neglect  Goal: Verbalize thoughts and feelings  Description: Interventions:  - Assess and re-assess patient's lethality and potential for self-injury  - Engage patient in 1:1 interactions, daily, for a minimum of 15 minutes  - Encourage patient to express feelings, fears, frustrations, hopes  - Establish rapport/trust with patient   Outcome: Progressing  Goal: Refrain from harming self  Description: Interventions:  - Monitor patient closely, per order  - Develop a trusting relationship  - Supervise medication ingestion, monitor effects and side effects   Outcome: Progressing     Problem: Depression  Goal: Refrain from isolation  Description: Interventions:  - Develop a trusting relationship   - Encourage socialization   Outcome: Progressing

## 2023-08-07 NOTE — SOCIAL WORK
Marie Mar at [facility] Dr. Jean Paul [facility] phone call 8/16 1115am, talk therapy with matt in person 8/15 12pm.

## 2023-08-07 NOTE — NURSING NOTE
Pt belongings returned, medications returned from the pharmacy. Pt AVS reviewed, pt verbalized understanding. Pt was walked downstairs and was picked up by her girlfriend.

## 2023-08-07 NOTE — SOCIAL WORK
Per [employer], pt will be contacted to start php as there is about a one week wait list. Noted on DC summary.

## 2023-08-07 NOTE — PROGRESS NOTES
08/07/23    Team Meeting   Meeting Type Daily Rounds   Team Members Present   Team Members Present Physician;Nurse;   Physician Team Member Dr. Shaniqua Burgos MD; Dayanna Figueroa; Dr. Kalyani Graff MD; Dr Shelli Fung MD   Nursing Team Member Enma lloyd RN   Social Work Team Member [place], [place]; [place], [place]   Patient/Family Present   Patient Present No   Patient's Family Present No     On php wait list, [facility] OP follow up, fixated on dc today for vet visit today, feels safe, no SI, no behaviors, med compliant.

## 2023-08-07 NOTE — BH TRANSITION RECORD
Contact Information: If you have any questions, concerns, pended studies, tests and/or procedures, or emergencies regarding your inpatient behavioral health visit. Please contact [facility] # [phone] and ask to speak to a , nurse or physician. A contact is available 24 hours/ 7 days a week at this number. Summary of Procedures Performed During your Stay:  Below is a list of major procedures performed during your hospital stay and a summary of results:  - No major procedures performed. Pending Studies (From admission, onward)    None        Please follow up on the above pending studies with your PCP and/or referring provider.

## 2023-08-09 ENCOUNTER — TELEPHONE (OUTPATIENT)
Dept: PSYCHOLOGY | Facility: CLINIC | Age: 38
End: 2023-08-09

## 2023-08-09 NOTE — TELEPHONE ENCOUNTER
Dorie Miller just returned my call at this time she cant do program due to nasty migraines. Once she sees her dr in [facility] maybe she can do it then she will see how she feels after the appt.     Jazzmine Liang

## 2023-08-10 ENCOUNTER — TRANSITIONAL CARE MANAGEMENT (OUTPATIENT)
Dept: FAMILY MEDICINE CLINIC | Facility: CLINIC | Age: 38
End: 2023-08-10

## 2023-08-14 DIAGNOSIS — E66.01 MORBID OBESITY (HCC): ICD-10-CM

## 2023-08-14 DIAGNOSIS — R73.01 IFG (IMPAIRED FASTING GLUCOSE): ICD-10-CM

## 2023-08-14 NOTE — TELEPHONE ENCOUNTER
Patient called stating she just completed her 4th week of the 0.25 ozempic and is ready for the .5. She is asking this you can send it to [facility]. She has been tolerating it well.

## 2023-08-19 DIAGNOSIS — G43.709 CHRONIC MIGRAINE WITHOUT AURA WITHOUT STATUS MIGRAINOSUS, NOT INTRACTABLE: ICD-10-CM

## 2023-08-21 RX ORDER — RIMEGEPANT SULFATE 75 MG/75MG
TABLET, ORALLY DISINTEGRATING ORAL
Qty: 16 TABLET | Refills: 2 | Status: SHIPPED | OUTPATIENT
Start: 2023-08-21

## 2023-08-23 ENCOUNTER — TELEPHONE (OUTPATIENT)
Dept: FAMILY MEDICINE CLINIC | Facility: CLINIC | Age: 38
End: 2023-08-23

## 2023-08-23 NOTE — TELEPHONE ENCOUNTER
Called patient to reschedule an apt for her TCM. She was unable to come on 8/17/23 and left a voicemail to us to reschedule a new appointment.

## 2023-08-25 ENCOUNTER — TELEPHONE (OUTPATIENT)
Dept: NEUROLOGY | Facility: CLINIC | Age: 38
End: 2023-08-25

## 2023-08-25 NOTE — TELEPHONE ENCOUNTER
Yes that is ok. Did she mention if she was going to be seeing [facility]? If not I can reach out to follow up.

## 2023-08-25 NOTE — TELEPHONE ENCOUNTER
Hello,     Patient has called back t/o schedule her follow up appointment. She stated the LOV on 5/18/23 with Marianne Garcia she was advised that next office visit should be with Yuliet Ruiz due to all the medication she is on. Per LOV patient should be seen around 11/18/2023 but no available openings with . I found one open slot OVS in [facility] on 10/31/2023 at 1:30 pm.    Please advise if ok to keep that appointment or you would like to offer another slot with Yuliet Ruiz? She prefers [place]. ..     Thank you in advance,     Yon Andrew

## 2023-08-28 ENCOUNTER — TELEPHONE (OUTPATIENT)
Dept: FAMILY MEDICINE CLINIC | Facility: CLINIC | Age: 38
End: 2023-08-28

## 2023-08-28 NOTE — TELEPHONE ENCOUNTER
Just STEPHANY Terrell, this is Torri Oliveira. My YOB: 1985. Phone number [phone], I'm calling. I spoke to [place] a few times last week regarding coming in to sign a paper, a release to get medical records sent to [facility]. I was supposed to come in on Friday. I never made it in. I was having an issue with my insurance and my appointment had to get pushed back until November because I have to see a special provider because of my insurance at [facility]. So I have an appointment in September to see you guys. So I probably will not need anything done with medical records before that time. I don't think there's anything I need to do right now. I don't think [place] would have to call me or anything, but if there is any questions that she just wanted to call me back, it's [phone]. Thank you. Jeet. You received a voice mail from [facility]. Called patient back to discuss signing a release. Advised patient to come sign the release soon in case she is contacted to come in sooner. Pt will need a release signed for records and disc. She will have them sent to her directly so she can hand carry since she isn't certain which provider she will be seeing.  Thank you

## 2023-09-18 ENCOUNTER — TELEPHONE (OUTPATIENT)
Dept: OTOLARYNGOLOGY | Facility: CLINIC | Age: 38
End: 2023-09-18

## 2023-09-19 DIAGNOSIS — R60.9 SWELLING: Primary | ICD-10-CM

## 2023-09-21 ENCOUNTER — OFFICE VISIT (OUTPATIENT)
Dept: FAMILY MEDICINE CLINIC | Facility: CLINIC | Age: 38
End: 2023-09-21
Payer: COMMERCIAL

## 2023-09-21 VITALS
SYSTOLIC BLOOD PRESSURE: 132 MMHG | HEIGHT: 67 IN | WEIGHT: 293 LBS | DIASTOLIC BLOOD PRESSURE: 82 MMHG | BODY MASS INDEX: 45.99 KG/M2 | HEART RATE: 94 BPM | OXYGEN SATURATION: 97 % | TEMPERATURE: 98.1 F

## 2023-09-21 DIAGNOSIS — K11.7 XEROSTOMIA: ICD-10-CM

## 2023-09-21 DIAGNOSIS — G44.229 CHRONIC TENSION-TYPE HEADACHE, NOT INTRACTABLE: Primary | ICD-10-CM

## 2023-09-21 DIAGNOSIS — K59.09 CHRONIC CONSTIPATION: ICD-10-CM

## 2023-09-21 DIAGNOSIS — E50.7 XEROPHTHALMIA: ICD-10-CM

## 2023-09-21 PROCEDURE — 99214 OFFICE O/P EST MOD 30 MIN: CPT | Performed by: FAMILY MEDICINE

## 2023-09-21 RX ORDER — LORAZEPAM 0.5 MG/1
0.5 TABLET ORAL 2 TIMES DAILY PRN
COMMUNITY

## 2023-09-21 RX ORDER — FUROSEMIDE 40 MG/1
40 TABLET ORAL 2 TIMES DAILY
COMMUNITY
End: 2023-09-27

## 2023-09-21 RX ORDER — ROPINIROLE 4 MG/1
4 TABLET, FILM COATED ORAL
COMMUNITY

## 2023-09-21 RX ORDER — MELATONIN
5000 DAILY
COMMUNITY

## 2023-09-21 RX ORDER — LISDEXAMFETAMINE DIMESYLATE 70 MG/1
70 CAPSULE ORAL DAILY
COMMUNITY
Start: 2023-08-30

## 2023-09-21 RX ORDER — FOLIC ACID 1 MG/1
1 TABLET ORAL DAILY
COMMUNITY

## 2023-09-21 NOTE — PROGRESS NOTES
Name: Sara Bianchi      : [birth year]      MRN: [id number]  Encounter Provider: Myke Day DO  Encounter Date: 2023   Encounter department: [address] [place] [address]     1. Chronic tension-type headache, not intractable  Assessment & Plan:  Patient reports severe headaches preceding her cyclic fatigue episodes, which usually last a week. Correlate with chronic fatigue syndrome  She has been prescribed Tizanidine and Neurontin for her tension headaches, but she doesn't like the sedating side effects. Recommended OMT. Patient underwent suboccipital release in office. Consider more craniosacral therapy in future. 2. Xerophthalmia  Assessment & Plan:  Patient concerned about autoimmune disease  Will order JANE    Orders:  -     JANE Screen w/ Reflex to Titer/Pattern; Future    3. Xerostomia  Assessment & Plan:  Started when she took Diamox for ICH  Patient concerned about autoimmune disease  Will order JANE    Orders:  -     JANE Screen w/ Reflex to Titer/Pattern; Future    4. Chronic constipation  Assessment & Plan:  Not sufficiently well-managed with Linzess. Diet should include sufficient fiber. More water intake. Subjective      Patient is here to follow up on chronic conditions. Her chief complaint is chronic headaches. She says that she can feel functional for about a week at a time, but then gets 3-4 days where she's "basically bedridden" d/t extreme fatigue/stupor/tiredness. During these periods, patient won't eat or drink for a couple days. She'll get so tired that she won't even want to go to the bathroom. After a week of remission, she'll have pain in back of head, and weight gain, and then fatigue starts again. She says that these episodes would last for a week before she got on migraine meds. She's on Lasix for ICP and fluid retention, but it doesn't really help. She had been on diamox for 1 week but stopped b/c of severe xerophthalmia.  She wonders whether she might have an autoimmune disease - one of her specialists recommended an antibody panel. Patient has had headaches her whole life. But they got really bad (along with fluid retention) back in 2019, when she was not sleeping well during a really bad relationship. Discussed anxiety. She indicates that the anxiety is relatively well-managed, but worsened/exacerbated by the headaches. She feels like she needs more help managing these headaches, so she plans to go to [facility] in October to see a new neurologist.  Constipation: some blood in her stools a couple weeks ago, which she attributes to hemorrhoids. She feels like the Linzess doesn't help much. Discussed proper diet and fluid intake. Review of Systems   Constitutional: Positive for appetite change and fatigue. Negative for diaphoresis and fever. Respiratory: Negative for shortness of breath. Cardiovascular: Negative for chest pain and palpitations. Gastrointestinal: Positive for constipation. Negative for nausea. Neurological: Positive for dizziness, numbness and headaches.        Current Outpatient Medications on File Prior to Visit   Medication Sig   • buPROPion (WELLBUTRIN XL) 300 mg 24 hr tablet Take 300 mg by mouth daily   • celecoxib (CeleBREX) 200 mg capsule take 1 capsule by mouth twice a day   • Cequa 0.09 % SOLN Apply 1 drop to eye 2 (two) times a day   • cholecalciferol (VITAMIN D3) 1,000 units tablet Take 5,000 Units by mouth daily   • docusate sodium (COLACE) 100 mg capsule Take 1 capsule (100 mg total) by mouth 2 (two) times a day   • fluticasone (FLONASE) 50 mcg/act nasal spray 2 sprays into each nostril 2 (two) times a day   • folic acid (FOLVITE) 1 mg tablet Take 1 mg by mouth daily   • furosemide (LASIX) 40 mg tablet Take 40 mg by mouth 2 (two) times a day   • gabapentin (Neurontin) 600 MG tablet Take 1 tablet (600 mg total) by mouth 2 (two) times a day   • Galcanezumab-gnlm (Emgality) 120 MG/ML SOAJ Inject 120 mg under the skin every 30 (thirty) days   • levothyroxine 125 mcg tablet Take 1 tablet (125 mcg total) by mouth daily in the early morning Do not start before August 6, 2023. • linaCLOtide (Linzess) 290 MCG CAPS Take 1 capsule by mouth daily   • loratadine (CLARITIN) 10 mg tablet Take 1 tablet (10 mg total) by mouth daily   • LORazepam (ATIVAN) 0.5 mg tablet Take 0.5 mg by mouth 2 (two) times a day as needed for anxiety   • metFORMIN (GLUCOPHAGE) 500 mg tablet Take 1 tablet (500 mg total) by mouth daily with breakfast   • Multiple Vitamin (MULTIVITAMIN) capsule Take 1 capsule by mouth daily   • Nurtec 75 MG TBDP TAKE 75MG BY MOUTH EVERY OTHER DAY. IF THERE IS A MIGRAINE ON A NON-NURTEC DAY ,YOU CAN TAKE A DOSE AND SKIP THE FOLLOWING DAY   • omeprazole (PriLOSEC) 20 mg delayed release capsule Take 1 capsule (20 mg total) by mouth 2 (two) times a day   • propranolol (INDERAL) 20 mg tablet Take 20 mg by mouth every 12 (twelve) hours   • rOPINIRole (REQUIP) 4 mg tablet Take 4 mg by mouth daily at bedtime   • semaglutide, 0.25 or 0.5 mg/dose, (Ozempic, 0.25 or 0.5 MG/DOSE,) 2 mg/3 mL injection pen Inject 0.25mg subcutaneously once weekly for 4 weeks and then increase to 0.5mg once weekly   • spironolactone (ALDACTONE) 50 mg tablet Take 1 tablet (50 mg total) by mouth daily   • SUMAtriptan (IMITREX) 100 mg tablet 1 tab as needed for migraine, can repeat once in 2 hours, max 200mg in 24 hours. • topiramate (TOPAMAX) 100 mg tablet Take 1.5 tablets (150 mg total) by mouth 2 (two) times a day   • Vraylar 3 MG capsule Take 3 mg by mouth daily Daily in the evening   • Vraylar 6 MG capsule Take 6 mg by mouth daily Daily in the morning   • Vyvanse 70 MG capsule Take 70 mg by mouth daily   • melatonin 3 mg Take 1 tablet (3 mg total) by mouth daily at bedtime (Patient not taking: Reported on 9/21/2023)   • PARoxetine (PAXIL) 40 MG tablet Take 1 tablet (40 mg total) by mouth daily Do not start before August 8, 2023. Objective     /82   Pulse 94   Temp 98.1 °F (36.7 °C)   Ht 5' 7" (1.702 m)   Wt (!) 172 kg (379 lb 3.2 oz)   SpO2 97%   BMI 59.39 kg/m²     Physical Exam  Vitals reviewed. Constitutional:       Appearance: Normal appearance. She is obese. She is not ill-appearing. HENT:      Head: Normocephalic and atraumatic. Cardiovascular:      Rate and Rhythm: Normal rate and regular rhythm. Heart sounds: Normal heart sounds. Pulmonary:      Effort: Pulmonary effort is normal.      Breath sounds: Normal breath sounds. Abdominal:      Palpations: Abdomen is soft. Tenderness: There is no abdominal tenderness. Musculoskeletal:      Right lower leg: No edema. Left lower leg: No edema. Comments: Some tenderness at the base of the occiput and right upper cervical processes. Neurological:      Mental Status: She is alert.        Flaquito Omer DO

## 2023-09-21 NOTE — ASSESSMENT & PLAN NOTE
Not sufficiently well-managed with Linzess. Diet should include sufficient fiber. More water intake.

## 2023-09-21 NOTE — ASSESSMENT & PLAN NOTE
Started when she took Diamox for [facility]  Patient concerned about autoimmune disease  Will order JANE

## 2023-09-21 NOTE — ASSESSMENT & PLAN NOTE
Patient reports severe headaches preceding her cyclic fatigue episodes, which usually last a week. Correlate with chronic fatigue syndrome  She has been prescribed Tizanidine and Neurontin for her tension headaches, but she doesn't like the sedating side effects. Recommended OMT. Patient underwent suboccipital release in office. Consider more craniosacral therapy in future.

## 2023-09-22 ENCOUNTER — OFFICE VISIT (OUTPATIENT)
Dept: OTOLARYNGOLOGY | Facility: CLINIC | Age: 38
End: 2023-09-22
Payer: COMMERCIAL

## 2023-09-22 VITALS
DIASTOLIC BLOOD PRESSURE: 62 MMHG | HEART RATE: 80 BPM | OXYGEN SATURATION: 99 % | TEMPERATURE: 97.5 F | HEIGHT: 67 IN | BODY MASS INDEX: 45.99 KG/M2 | WEIGHT: 293 LBS | SYSTOLIC BLOOD PRESSURE: 128 MMHG

## 2023-09-22 DIAGNOSIS — K04.7 DENTAL INFECTION: ICD-10-CM

## 2023-09-22 DIAGNOSIS — J32.0 CHRONIC MAXILLARY SINUSITIS: Primary | ICD-10-CM

## 2023-09-22 DIAGNOSIS — J32.2 CHRONIC ETHMOIDAL SINUSITIS: ICD-10-CM

## 2023-09-22 DIAGNOSIS — Z98.890 S/P FESS (FUNCTIONAL ENDOSCOPIC SINUS SURGERY): ICD-10-CM

## 2023-09-22 DIAGNOSIS — Z87.09 HISTORY OF NASAL POLYP: ICD-10-CM

## 2023-09-22 PROCEDURE — 31231 NASAL ENDOSCOPY DX: CPT | Performed by: OTOLARYNGOLOGY

## 2023-09-22 PROCEDURE — 99214 OFFICE O/P EST MOD 30 MIN: CPT | Performed by: OTOLARYNGOLOGY

## 2023-09-22 NOTE — PROGRESS NOTES
Review of Systems   Constitutional: Negative. HENT: Positive for congestion, rhinorrhea, sinus pressure and sinus pain. Eyes: Negative. Respiratory: Negative. Cardiovascular: Negative. Gastrointestinal: Negative. Endocrine: Negative. Genitourinary: Negative. Musculoskeletal: Negative. Skin: Negative. Allergic/Immunologic: Negative. Neurological: Positive for headaches. Hematological: Negative. Psychiatric/Behavioral: Negative.

## 2023-09-22 NOTE — PROGRESS NOTES
Otolaryngology Clinic Visit  Name:  Heri Maciel  MRN:  [id number]  Date:  9/22/2023 11:17 AM  ________________________________________________________________________       CHIEF COMPLAINT:   Chronic sinus concerns, frontal headache     HPI:  Heri Maciel is a 43 y.o. female     New patient presenting to our office with her partner for evaluation of chronic sinusitis with nasal polyposis. At the age of 11, she had endoscopic sinus surgery with nasal polypectomy. She did well postoperatively until 2018/2019 when she started developing more frequent headaches and sinus pressure. Imaging was performed at that time, identifying opacified ethmoid sinuses. An in-office procedure was recommended, but not pursued because she moved to a different area.     She had a "bad sinus infection" after testing positive for COVID in 10/2022. Since then, she has been experiencing intermittent malodorous nasal drainage, copious PND, intermittent nasal congestion, dry nasal mucosa. She also has been having constant headaches, experiencing pain around the eyes and back of the head. Neurology managing migraine and benign intracranial hypertension. She uses the navage a few times per week for years. Was using Flonase from 2018 to 2019, but is not fond of the taste.     2 weeks ago, she was evaluated at the dentist after developing dental and facial pain, facial swelling, foul-smelling drainage. She was placed on a 10-day course of clindamycin and was provided a refill just in case symptoms persisted. Extraction was recommended. She states that the antibiotic helped with the drainage, but will intermittently  on a foul smell in her nose. INTERVAL HISTORY  Pt continues having sinus concerns: have constant PND, frontal headache (can last days at a time), congestion. Pt isn't consistently taking any meds or saline rinse intranasally right now. Pt is seeing a neurologist at [facility] in November.     PMHx:  Past Medical History:   Diagnosis Date   • Allergic    • Allergic rhinitis    • Anorexia nervosa in remission    • Anxiety    • Arthritis 5/8/2014   • Back pain    • Bipolar disorder ([address])    • Depression    • Disease of thyroid gland    • Diverticulitis of colon 9/20/2015   • Dizziness    • Ear problems    • Eating disorder    • Esophageal reflux 08/15/2013   • GERD (gastroesophageal reflux disease) 8/15/2013   • Headache(784.0)    • Headache, tension-type    • Hypertension    • Hypothyroid    • Idiopathic intracranial hypertension    • Lumbar degenerative disc disease 05/08/2014   • Migraine    • Nasal congestion    • Nosebleed    • Obesity    • Obsessive-compulsive disorder    • Otitis media    • Overactive bladder    • Psychiatric disorder    • Restless leg syndrome, controlled    • Seasonal allergies    • Sinusitis    • Sleep apnea    • Sleep difficulties    • Suicide and self-inflicted injury ([address])    • Tinnitus    • TMJ dysfunction    • Tonsillitis    • Transcranial Magnetic Stimulation 09/06/2021   • Urinary tract infection    • Vision loss        PSHx:  Past Surgical History:   Procedure Laterality Date   • DENTAL SURGERY      wisdom teeth-at [age] years.  Other surgery dental extraxtion of bone spur (10 years ago)   • IR LUMBAR PUNCTURE  02/26/2019   • SINUS ENDOSCOPY     • SINUS SURGERY     • TONSILLECTOMY         FAMHx:  Family History   Problem Relation Age of Onset   • Mental illness Mother    • Depression Mother    • Suicide Attempts Mother    • Hypertension Mother    • Diabetes Mother    • Other Mother         bicuspid aortic valve   • Anxiety disorder Mother    • Psychiatric Illness Mother    • Schizoaffective Disorder  Mother    • Hypertension Father    • Hypothyroidism Father    • Thyroid disease Father    • Hypertension Brother    • Cancer Maternal Grandmother    • Heart disease Maternal Grandmother    • Stroke Maternal Grandmother    • Breast cancer Maternal Grandmother    • Skin cancer Maternal Grandmother • Arthritis Maternal Grandmother    • Pneumonia Maternal Grandfather    • Cancer Maternal Grandfather    • Dementia Maternal Grandfather    • COPD Maternal Grandfather    • Cancer Paternal Grandmother    • Pneumonia Paternal Grandfather    • Arthritis Family    • Breast cancer Family    • Osteopenia Family    • Osteoporosis Family    • Hypertension Family    • Transient ischemic attack Family        SOCHx:  Social History     Socioeconomic History   • Marital status: Single     Spouse name: None   • Number of children: None   • Years of education: None   • Highest education level: None   Occupational History   • Occupation: DISABLED   Tobacco Use   • Smoking status: Never     Passive exposure: Never   • Smokeless tobacco: Never   Vaping Use   • Vaping Use: Never used   Substance and Sexual Activity   • Alcohol use: Never   • Drug use: Never   • Sexual activity: Yes     Partners: Female     Comment: No STDs   Other Topics Concern   • None   Social History Narrative    Always uses seat belts    Caffeine use     Disabled - permament disability since 2008 due to psychiatric illness    Has no children    Single     Tea     Social Determinants of Health     Financial Resource Strain: Medium Risk (3/15/2023)    Overall Financial Resource Strain (CARDIA)    • Difficulty of Paying Living Expenses: Somewhat hard   Food Insecurity: No Food Insecurity (9/16/2020)    Hunger Vital Sign    • Worried About Running Out of Food in the Last Year: Never true    • Ran Out of Food in the Last Year: Never true   Transportation Needs: No Transportation Needs (3/15/2023)    PRAPARE - Transportation    • Lack of Transportation (Medical): No    • Lack of Transportation (Non-Medical): No   Physical Activity: Not on file   Stress: Not on file   Social Connections: Not on file   Intimate Partner Violence: Not on file   Housing Stability: Not on file       Allergies:   Allergies   Allergen Reactions   • Doxycycline      Pt refuses d/t remote H/O intracranial hypertension    • Other      Seasonal allergies         MEDS:  Reviewed    ROS:  As above       PHYSICAL EXAM:  /62 (BP Location: Left arm, Cuff Size: Large)   Pulse 80   Temp 97.5 °F (36.4 °C) (Temporal)   Ht 5' 7" (1.702 m)   Wt (!) 172 kg (379 lb 3.2 oz)   SpO2 99%   BMI 59.39 kg/m²   General: NAD, AOx4  Eyes:  EOMI  Ears:  Right: ear canal normal, TM normal apperance, no fluid  Left: ear canal normal, TM normal apperance, no fluid  Nose:  No septal deviation, no inferior turbinate hypertrophy, no mass/lesions, see scope exam for details  Oral cavity:  No trismus, no mass/lesions, tonsils 1+  Neck: Trachea is midline; no thyroid nodules, Salivary glands symmetrical, no masses/abnormality on palpation  Lymph:  No cervical lymphadenopathy  Skin:  No obvious facial lesions  Neuro: Face symmetrical, no obvious cranial nerve palsy. No focal deficits   Lungs:  Normal work of breathing, symmetrical chest expansion  Vascular: Well perfused    Procedures:  SINUS ENDOSCOPY:  Procedure: Sinus endoscopy    Diagnosis: Nasal obstruction   : Nena    The risks, benefits and alternatives were discussed. The patient voiced their understanding. They wished to proceed and an informed consent was obtained. The patient's nose was topically decongested and anesthetized using Lidocaine and oxymetazoline. The fiberoptic endoscope was inserted via both sinus cavities. Septum was found to be straight. The inferior turbinates were mildly enlarged. Green crusting seen throughout    Right:   The middle turbinate was unremarkable but has green crusting. The nasopharynx showed no significant lesions or purulence. Frontal recess showed unremarkable Sphenoethmoidal recess showed unremarkable. Maxillary sinus ostia showed no polyp or purulent dc, crusting throughout. Left:   The middle turbinate was unremarkable but has green crusting. The nasopharynx showed no significant lesions or purulence. Frontal recess showed unremarkable Sphenoethmoidal recess showed unremarkable. Maxillary sinus ostia showed no polyp or purulent dc, crusting throughout. Patient tolerated the procedure well with no complications. Medical Data Reviewed:  Records reviewed and summarized as in [facility]    Radiology:  CT sinus (4/21/23):   Evidence of bilateral functional endoscopic sinus surgery similar to the prior study. The antrostomy sites are patent. Mucosal thickening involving the right maxillary antrum. Stable opacified left posterior ethmoid air cell. Maxillary teeth root visualized through inferior maxillary sinus wall.     Labs:  N/A     Patient Active Problem List   Diagnosis   • Obsessive-compulsive disorder   • Hypertension   • Hypothyroidism   • Overactive bladder   • Allergic rhinitis   • Binge-eating disorder, severe   • Gambling disorder, moderate   • Routine screening for STI (sexually transmitted infection)   • BMI 50.0-59.9, adult ([facility])   • Dysfunction of both eustachian tubes   • Bulging lumbar disc   • Esophageal reflux   • EILEEN (generalized anxiety disorder)   • Lumbar degenerative disc disease   • Morbid obesity ([facility])   • Nocturnal enuresis   • PTSD (post-traumatic stress disorder)   • Urgency incontinence   • Vitamin D deficiency   • Chronic midline low back pain without sciatica   • Iliotibial band syndrome of left side   • Piriformis syndrome of left side   • Skin lesion   • Recurrent sinusitis   • IFG (impaired fasting glucose)   • Family history of cancer   • Intracranial hypertension   • Chronic migraine without aura without status migrainosus, not intractable   • Chronic tension-type headache, not intractable   • Menorrhagia with irregular cycle   • Encounter for insertion of mirena IUD   • CAIO (obstructive sleep apnea)   • IUD check up   • Class 3 severe obesity with body mass index (BMI) of 60.0 to 69.9 in adult [facility])   • Cervicalgia   • Numbness and tingling in both hands   • Dermatitis   • Chronic constipation   • Thyroid disease neuropathy (HCC)   • Encounter for medication monitoring   • Hypervolemia   • Migraine headache   • Hyperaldosteronism (HCC)   • Persistent proteinuria   • Chronic ethmoidal sinusitis   • Chronic maxillary sinusitis   • Depression   • Xerophthalmia   • Xerostomia       ASSESSMENT/PLAN:  Maribel Crooks is a 43 y.o. female with acute and chronic problems as above who presents with:    1. Chronic maxillary sinusitis    2. Chronic ethmoidal sinusitis    3. History of nasal polyp    4. S/P FESS (functional endoscopic sinus surgery)        43 y.o. here today for further evaluation of chronic nasal irritation and headache. Pt does not use any nasal saline rinse and was informed she needs to do it daily to help flush out nasal mucosa. Pt also needs to see dentist to discuss further plans regarding b/l maxillary teeth root protruding through maxillary sinus inferior walls. Pt's headache doesn't match sinonasal etiologies and more aligns w/ neurologic origins. Pt should maintain her appt w/ neurology for further opinions.     RTC in [place]limpia Millan MD MPH  Otolaryngology--Head and Neck Surgery  [facility]  9/22/2023 11:17 AM

## 2023-09-23 ENCOUNTER — APPOINTMENT (OUTPATIENT)
Dept: LAB | Facility: CLINIC | Age: 38
End: 2023-09-23
Payer: COMMERCIAL

## 2023-09-23 DIAGNOSIS — G44.89 HEADACHE ASSOCIATED WITH HORMONAL FACTORS: ICD-10-CM

## 2023-09-23 DIAGNOSIS — E50.7 XEROPHTHALMIA: ICD-10-CM

## 2023-09-23 DIAGNOSIS — R60.9 SWELLING: ICD-10-CM

## 2023-09-23 DIAGNOSIS — K11.7 XEROSTOMIA: ICD-10-CM

## 2023-09-23 LAB
ALBUMIN SERPL BCP-MCNC: 3.9 G/DL (ref 3.5–5)
ALP SERPL-CCNC: 76 U/L (ref 34–104)
ALT SERPL W P-5'-P-CCNC: 16 U/L (ref 7–52)
ANION GAP SERPL CALCULATED.3IONS-SCNC: 5 MMOL/L
AST SERPL W P-5'-P-CCNC: 13 U/L (ref 13–39)
BASOPHILS # BLD AUTO: 0.08 THOUSANDS/ÂΜL (ref 0–0.1)
BASOPHILS NFR BLD AUTO: 1 % (ref 0–1)
BILIRUB SERPL-MCNC: 0.26 MG/DL (ref 0.2–1)
BUN SERPL-MCNC: 19 MG/DL (ref 5–25)
CALCIUM SERPL-MCNC: 9 MG/DL (ref 8.4–10.2)
CHLORIDE SERPL-SCNC: 110 MMOL/L (ref 96–108)
CO2 SERPL-SCNC: 25 MMOL/L (ref 21–32)
CREAT SERPL-MCNC: 0.71 MG/DL (ref 0.6–1.3)
EOSINOPHIL # BLD AUTO: 0.2 THOUSAND/ÂΜL (ref 0–0.61)
EOSINOPHIL NFR BLD AUTO: 3 % (ref 0–6)
ERYTHROCYTE [DISTWIDTH] IN BLOOD BY AUTOMATED COUNT: 13.5 % (ref 11.6–15.1)
GFR SERPL CREATININE-BSD FRML MDRD: 107 ML/MIN/1.73SQ M
GLUCOSE P FAST SERPL-MCNC: 95 MG/DL (ref 65–99)
HCT VFR BLD AUTO: 43.8 % (ref 34.8–46.1)
HGB BLD-MCNC: 13.9 G/DL (ref 11.5–15.4)
IMM GRANULOCYTES # BLD AUTO: 0.03 THOUSAND/UL (ref 0–0.2)
IMM GRANULOCYTES NFR BLD AUTO: 0 % (ref 0–2)
LYMPHOCYTES # BLD AUTO: 2.09 THOUSANDS/ÂΜL (ref 0.6–4.47)
LYMPHOCYTES NFR BLD AUTO: 27 % (ref 14–44)
MAGNESIUM SERPL-MCNC: 1.9 MG/DL (ref 1.9–2.7)
MCH RBC QN AUTO: 28.8 PG (ref 26.8–34.3)
MCHC RBC AUTO-ENTMCNC: 31.7 G/DL (ref 31.4–37.4)
MCV RBC AUTO: 91 FL (ref 82–98)
MONOCYTES # BLD AUTO: 0.56 THOUSAND/ÂΜL (ref 0.17–1.22)
MONOCYTES NFR BLD AUTO: 7 % (ref 4–12)
NEUTROPHILS # BLD AUTO: 4.93 THOUSANDS/ÂΜL (ref 1.85–7.62)
NEUTS SEG NFR BLD AUTO: 62 % (ref 43–75)
NRBC BLD AUTO-RTO: 0 /100 WBCS
PLATELET # BLD AUTO: 316 THOUSANDS/UL (ref 149–390)
PMV BLD AUTO: 10.4 FL (ref 8.9–12.7)
POTASSIUM SERPL-SCNC: 4.1 MMOL/L (ref 3.5–5.3)
PROT SERPL-MCNC: 6.7 G/DL (ref 6.4–8.4)
RBC # BLD AUTO: 4.83 MILLION/UL (ref 3.81–5.12)
SODIUM SERPL-SCNC: 140 MMOL/L (ref 135–147)
WBC # BLD AUTO: 7.89 THOUSAND/UL (ref 4.31–10.16)

## 2023-09-23 PROCEDURE — 85025 COMPLETE CBC W/AUTO DIFF WBC: CPT

## 2023-09-23 PROCEDURE — 86038 ANTINUCLEAR ANTIBODIES: CPT

## 2023-09-23 PROCEDURE — 80053 COMPREHEN METABOLIC PANEL: CPT

## 2023-09-23 PROCEDURE — 83735 ASSAY OF MAGNESIUM: CPT

## 2023-09-23 PROCEDURE — 36415 COLL VENOUS BLD VENIPUNCTURE: CPT

## 2023-09-24 LAB — ANA SER QL IA: NEGATIVE

## 2023-09-25 ENCOUNTER — PATIENT MESSAGE (OUTPATIENT)
Dept: BARIATRICS | Facility: CLINIC | Age: 38
End: 2023-09-25

## 2023-09-26 DIAGNOSIS — R73.03 PREDIABETES: Primary | ICD-10-CM

## 2023-09-26 NOTE — PSYCH
Progress Note  Psychotherapy Provided [facility]: Individual Psychotherapy 50 minutes provided today  Goals addressed in session:   1-3 D: Met with pt for Individual Therapy session  Elizabeth spoke today about her feelings re: the negative comments she has been making towards / about herself since deciding not to attend her first volunteering session yesterday  Reasons for this were processed  A: Constanza Larsen continues to be open and insightful as she works on her desire to develop a stronger sense of self as she works through her childhood trauma history  P: Upcoming sessions will be used to continue to address the above  She will continue to attend both weekly Trauma and ED group therapy sessions  Pain Scale and Suicide Risk [facility]: Current Pain Assessment: moderate to severe   On a scale of 0 to 10, the patient rates current pain at 4   Behavioral Health Treatment Plan [address] [place] [address] [address]: Diagnosis and Treatment Plan explained to patient, patient relates understanding diagnosis and is agreeable to Treatment Plan            Signatures   Electronically signed by : Harper Norton LCSW; Nov 22 2016  2:34PM EST                       (Author) Impression    # Septic shock due to pneumonia and UTI   # Numerous CRE Acinetobacter in the sputum  # Type II NSTEMI  # Hx of COPD   # Hx of acute hypoxemic respiratory failure S/P tracheostomy   # Hx of staphylococcus bacteremia, candida auris   # Hx of PEG due to oropharyngeal dysphagia   # Hypothyroidism  # Chronic Hyponatremia   # Pancytopenia, anemia  # Glaucoma  # GERD  # Seizure disorder  # Hx of CVA     Plan:    CNS: Continue AEDs, Monitor MS.  Pain control if needed.      HEENT: Oral care, trach care.    CARDIOVASCULAR: Off pressors, maintain MAP > 65.  Avoid overload     PULMONARY: HOB @ 45 degrees, aspiration precautions.  Long term ventilated.  Pulmonary toilet     GASTROINTESTINAL: GI prophylaxis. Tube feeds. Bowel regimen PRN.     GENITOURINARY/RENAL: Replete electrolytes as needed     INFECTIOUS:  Abx course per ID. MOnitor VS     HEMATOLOGIC: DVT prophylaxis.  Monitor CBC     ENDOCRINE: Follow up FS.  Insulin protocol as needed.   Continue Levothyroxine    MSK/DERM: bedrest.  Off loading     CODE STATUS: FULL    DISPO: 3 A vent unit     DC planing

## 2023-09-27 DIAGNOSIS — R60.0 BILATERAL LOWER EXTREMITY EDEMA: Primary | ICD-10-CM

## 2023-09-27 RX ORDER — FUROSEMIDE 40 MG/1
TABLET ORAL
Qty: 135 TABLET | Refills: 1 | Status: SHIPPED | OUTPATIENT
Start: 2023-09-27

## 2023-10-03 DIAGNOSIS — G43.719 INTRACTABLE CHRONIC MIGRAINE WITHOUT AURA AND WITHOUT STATUS MIGRAINOSUS: ICD-10-CM

## 2023-10-03 DIAGNOSIS — R73.03 PREDIABETES: Primary | ICD-10-CM

## 2023-10-03 RX ORDER — TOPIRAMATE 100 MG/1
150 TABLET, FILM COATED ORAL 2 TIMES DAILY
Qty: 270 TABLET | Refills: 1 | Status: SHIPPED | OUTPATIENT
Start: 2023-10-03

## 2023-10-03 NOTE — TELEPHONE ENCOUNTER
Recd  10/2 taken off 10/3  my name is Yu Olea, Birth date is August 18th. 1985, phone number [phone]. I am calling because I need a refill of topamax. I take a 100 milligrams, one and a half in the morning, one and a half in the evening. Last visit 5/18 [facility]    Due for refill; please sign script if in agreement, thank you.

## 2023-10-04 ENCOUNTER — APPOINTMENT (OUTPATIENT)
Dept: LAB | Facility: CLINIC | Age: 38
End: 2023-10-04
Payer: COMMERCIAL

## 2023-10-04 LAB
BACTERIA UR QL AUTO: ABNORMAL /HPF
BILIRUB UR QL STRIP: NEGATIVE
CLARITY UR: CLEAR
COLOR UR: ABNORMAL
CREAT UR-MCNC: 59.7 MG/DL
GLUCOSE UR STRIP-MCNC: NEGATIVE MG/DL
HGB UR QL STRIP.AUTO: NEGATIVE
KETONES UR STRIP-MCNC: NEGATIVE MG/DL
LEUKOCYTE ESTERASE UR QL STRIP: ABNORMAL
MICROALBUMIN UR-MCNC: <7 MG/L
MICROALBUMIN/CREAT 24H UR: <12 MG/G CREATININE (ref 0–30)
MUCOUS THREADS UR QL AUTO: ABNORMAL
NITRITE UR QL STRIP: NEGATIVE
NON-SQ EPI CELLS URNS QL MICRO: ABNORMAL /HPF
PH UR STRIP.AUTO: 5.5 [PH]
PROT UR STRIP-MCNC: NEGATIVE MG/DL
RBC #/AREA URNS AUTO: ABNORMAL /HPF
SP GR UR STRIP.AUTO: 1.01 (ref 1–1.03)
UROBILINOGEN UR STRIP-ACNC: <2 MG/DL
WBC #/AREA URNS AUTO: ABNORMAL /HPF

## 2023-10-04 PROCEDURE — 81001 URINALYSIS AUTO W/SCOPE: CPT

## 2023-10-04 PROCEDURE — 82043 UR ALBUMIN QUANTITATIVE: CPT

## 2023-10-04 PROCEDURE — 82570 ASSAY OF URINE CREATININE: CPT

## 2023-10-24 ENCOUNTER — OFFICE VISIT (OUTPATIENT)
Dept: NEUROLOGY | Facility: CLINIC | Age: 38
End: 2023-10-24
Payer: COMMERCIAL

## 2023-10-24 VITALS
DIASTOLIC BLOOD PRESSURE: 59 MMHG | WEIGHT: 293 LBS | SYSTOLIC BLOOD PRESSURE: 116 MMHG | BODY MASS INDEX: 45.99 KG/M2 | HEIGHT: 67 IN | HEART RATE: 70 BPM | TEMPERATURE: 98.3 F

## 2023-10-24 DIAGNOSIS — M54.2 CERVICALGIA: ICD-10-CM

## 2023-10-24 DIAGNOSIS — G43.709 CHRONIC MIGRAINE WITHOUT AURA WITHOUT STATUS MIGRAINOSUS, NOT INTRACTABLE: Primary | ICD-10-CM

## 2023-10-24 DIAGNOSIS — G43.909 MIGRAINE HEADACHE: ICD-10-CM

## 2023-10-24 PROCEDURE — 99214 OFFICE O/P EST MOD 30 MIN: CPT | Performed by: PSYCHIATRY & NEUROLOGY

## 2023-10-24 RX ORDER — RIZATRIPTAN BENZOATE 10 MG/1
10 TABLET ORAL ONCE AS NEEDED
Qty: 12 TABLET | Refills: 3 | Status: SHIPPED | OUTPATIENT
Start: 2023-10-24

## 2023-10-24 NOTE — PROGRESS NOTES
Patient ID: Nakia Crowe is a 43 y.o. female. Assessment/Plan:   Patient Instructions   Chronic migraine: That presents for a follow-up evaluation with regard to chronic migraine headaches. She continues to experience more or less daily headache which can be activity related, she also experiences more significant breakthrough migraines every few weeks in addition to any time when she is more fluid overloaded or around her menstrual cycle. She reports that her migraines are better when she has reduced drainage/irritation in the sinuses. -We will plan to continue her typical migraine preventative medicines including Celebrex, gabapentin, Emgality, propranolol, Nurtec, Topamax  -I have sent a message to the nephrology group at her request to discuss the possibility of a repeat trial of Diamox given that she was only able to tolerate it for a very short time. If that is a reasonable option for her we will work with the nephrology group with regard to dosing and whether or not she should be taking Lasix/spironolactone at the same time. If she is unable to take Diamox without reducing her other diuretic medicines we would likely consider switching Emgality to Vyepti infusion  -To treat a headache as it occurs she could use either 1 tablet of Nurtec if she has not already taken it that day, or 1 tablet of Maxalt. Maxalt can be repeated once in 2 hours with a maximum of 3 tablets in any 24-hour timeframe. Either medication could be taken with Tylenol or Benadryl as she sees fit. -I would like for her to keep track of her migraines, particularly the more severe breakthrough migraines, so that we can determine any specific patterns that can be used to our benefit in getting this under reasonable control    She will return to the office in 4 months time to see me directly but I will be in touch with her soon as we hear back from the nephrology group.        Diagnoses and all orders for this visit:    Chronic migraine without aura without status migrainosus, not intractable  -     rizatriptan (MAXALT) 10 mg tablet; Take 1 tablet (10 mg total) by mouth once as needed for migraine May repeat every 2 hours if needed, max 30mg/24 hours    Migraine headache  -     rizatriptan (MAXALT) 10 mg tablet; Take 1 tablet (10 mg total) by mouth once as needed for migraine May repeat every 2 hours if needed, max 30mg/24 hours    Cervicalgia           Subjective:    HPI  Since your last visit are your headaches Remained the Same    If better or worse, please explain: NA    Are you taking your current medications as prescribed? yes    If no, why not? NA    Do you have any side effects? no    How often do you use abortive medications to treat a headache? 2 times per week  How effective are they? not very effective    From 0-10, how severe is the pain for a typical headache for you? 8    What does your typical headache pain feel like? achy and pressure    Where is your typical headache? anterior neck and starts back of neck and goes to the side of the heads. Occipital and front of the head    What is your current headache frequency: 1 times per day    How long does your typical headache last? 4 hour(s)    In addition to the head pain, what other symptoms do you have before or during your headaches? light sensitivity, dizziness/light headedness, and brain fog    Do you have anything you need to discuss with the doctor during your visit? Antibiotic and headache relief from dental procedure. She reports that sinus congestion can have a significant impact on headache. There is also a hormonal component. Migraine: more severe, general soreness all over the head, pressure like, assoc with fluid retention and around menstrual cycle, really knocks her out for 2-3 days (used to be a whole week at one point)  These are happening once every 2 weeks.     Daily headache, from the back of the neck and around the eyes, has been present her whole adult life. Rare pain free days if it is already a good day and she is able to relax for the day. Current Preventive: Celebrex, Gabapentin, Emgality, propranolol, Nurtec, Topamax, Also notes that with increased fluid retention headaches increase and after significant diuresis she feels better. Current Abortive: Imitrex, tylenol/sleep        Prior Preventive:   Magnesium, B2, vitamin D, Claritin, lisinopril, Wellbutrin, Zoloft, haloperidol, lithium, Latuda, Cogentin, Risperdal, Lamictal, gabapentin, Diamox, cyclobenzaprine, tizanidine, indomethacin, Botox, Cefaly device    Prior Abortive: Toradol, ibuprofen, diclofenac, hydrocodone, Ativan     We discussed additional options in the office today including cyproheptadine (she had a little bit of concern with regard to increased appetite), Vyepti, and adjusting propranolol. Ultimately we decided to consider a Diamox transition but depending on the response from nephrology might still i transition to one of the above options.      Past Medical History:   Diagnosis Date   • Allergic    • Allergic rhinitis    • Anorexia nervosa in remission    • Anxiety    • Arthritis 5/8/2014   • Back pain    • Bipolar disorder ([address])    • Depression    • Disease of thyroid gland    • Diverticulitis of colon 9/20/2015   • Dizziness    • Ear problems    • Eating disorder    • Esophageal reflux 08/15/2013   • GERD (gastroesophageal reflux disease) 8/15/2013   • Headache(784.0)    • Headache, tension-type    • Hypertension    • Hypothyroid    • Idiopathic intracranial hypertension    • Lumbar degenerative disc disease 05/08/2014   • Migraine    • Nasal congestion    • Nosebleed    • Obesity    • Obsessive-compulsive disorder    • Otitis media    • Overactive bladder    • Psychiatric disorder    • Restless leg syndrome, controlled    • Seasonal allergies    • Sinusitis    • Sleep apnea    • Sleep difficulties    • Suicide and self-inflicted injury ([address])    • Tinnitus    • TMJ dysfunction    • Tonsillitis    • Transcranial Magnetic Stimulation 09/06/2021   • Urinary tract infection    • Vision loss        Current Outpatient Medications:   •  buPROPion (WELLBUTRIN XL) 300 mg 24 hr tablet, Take 300 mg by mouth daily, Disp: , Rfl:   •  celecoxib (CeleBREX) 200 mg capsule, take 1 capsule by mouth twice a day, Disp: 60 capsule, Rfl: 4  •  Cequa 0.09 % SOLN, Apply 1 drop to eye 2 (two) times a day, Disp: 60 each, Rfl: 0  •  cholecalciferol (VITAMIN D3) 1,000 units tablet, Take 5,000 Units by mouth daily, Disp: , Rfl:   •  docusate sodium (COLACE) 100 mg capsule, Take 1 capsule (100 mg total) by mouth 2 (two) times a day (Patient taking differently: Take 100 mg by mouth if needed), Disp: 60 capsule, Rfl: 0  •  folic acid (FOLVITE) 1 mg tablet, Take 1 mg by mouth daily, Disp: , Rfl:   •  furosemide (LASIX) 40 mg tablet, TAKE A HALF TABLET DAILY OR 1.5 TABLETS DAILY IF WEIGHT GAIN OF 3 POUNDS IN ONE DAY, Disp: 135 tablet, Rfl: 1  •  gabapentin (Neurontin) 600 MG tablet, Take 1 tablet (600 mg total) by mouth 2 (two) times a day (Patient taking differently: Take 600 mg by mouth 2 (two) times a day Half a tab in the morning and 1.5 tabs in the evening), Disp: 60 tablet, Rfl: 0  •  Galcanezumab-gnlm (Emgality) 120 MG/ML SOAJ, Inject 120 mg under the skin every 30 (thirty) days, Disp: 3 mL, Rfl: 3  •  levothyroxine 125 mcg tablet, Take 1 tablet (125 mcg total) by mouth daily in the early morning Do not start before August 6, 2023., Disp: 30 tablet, Rfl: 0  •  linaCLOtide (Linzess) 290 MCG CAPS, Take 1 capsule by mouth daily, Disp: 90 capsule, Rfl: 0  •  loratadine (CLARITIN) 10 mg tablet, Take 1 tablet (10 mg total) by mouth daily, Disp: 30 tablet, Rfl: 0  •  LORazepam (ATIVAN) 0.5 mg tablet, Take 0.5 mg by mouth 2 (two) times a day as needed for anxiety, Disp: , Rfl:   •  metFORMIN (GLUCOPHAGE) 500 mg tablet, Take 1 tablet (500 mg total) by mouth daily with breakfast, Disp: 90 tablet, Rfl: 0  • Multiple Vitamin (MULTIVITAMIN) capsule, Take 1 capsule by mouth daily, Disp: , Rfl:   •  Nurtec 75 MG TBDP, TAKE 75MG BY MOUTH EVERY OTHER DAY.   IF THERE IS A MIGRAINE ON A NON-NURTEC DAY ,YOU CAN TAKE A DOSE AND SKIP THE FOLLOWING DAY, Disp: 16 tablet, Rfl: 2  •  omeprazole (PriLOSEC) 20 mg delayed release capsule, Take 1 capsule (20 mg total) by mouth 2 (two) times a day, Disp: 180 capsule, Rfl: 0  •  PARoxetine (PAXIL) 40 MG tablet, Take 1 tablet (40 mg total) by mouth daily Do not start before August 8, 2023., Disp: 30 tablet, Rfl: 0  •  propranolol (INDERAL) 20 mg tablet, Take 20 mg by mouth every 12 (twelve) hours, Disp: , Rfl:   •  rizatriptan (MAXALT) 10 mg tablet, Take 1 tablet (10 mg total) by mouth once as needed for migraine May repeat every 2 hours if needed, max 30mg/24 hours, Disp: 12 tablet, Rfl: 3  •  rOPINIRole (REQUIP) 4 mg tablet, Take 4 mg by mouth daily at bedtime, Disp: , Rfl:   •  spironolactone (ALDACTONE) 50 mg tablet, Take 1 tablet (50 mg total) by mouth daily, Disp: 90 tablet, Rfl: 0  •  topiramate (TOPAMAX) 100 mg tablet, Take 1.5 tablets (150 mg total) by mouth 2 (two) times a day, Disp: 270 tablet, Rfl: 1  •  Vraylar 3 MG capsule, Take 3 mg by mouth daily Daily in the evening, Disp: , Rfl:   •  Vraylar 6 MG capsule, Take 6 mg by mouth daily Daily in the morning, Disp: , Rfl:   •  Vyvanse 70 MG capsule, Take 70 mg by mouth daily, Disp: , Rfl:   •  clindamycin (CLEOCIN) 300 MG capsule, Take 300 mg by mouth 2 (two) times a day, Disp: , Rfl:   •  fluticasone (FLONASE) 50 mcg/act nasal spray, 2 sprays into each nostril 2 (two) times a day, Disp: 16 g, Rfl: 0  •  melatonin 3 mg, Take 1 tablet (3 mg total) by mouth daily at bedtime (Patient not taking: Reported on 9/21/2023), Disp: 30 tablet, Rfl: 0  •  Semaglutide-Weight Management (WEGOVY) 1 MG/0.5ML, Inject 0.5 mL (1 mg total) under the skin once a week for 4 doses, Disp: 2 mL, Rfl: 0    Objective:    Blood pressure 116/59, pulse 70, temperature 98.3 °F (36.8 °C), temperature source Temporal, height 5' 7" (1.702 m), weight (!) 171 kg (376 lb 8 oz). Physical Exam    Neurological Exam    At the time of our discussion she was awake and alert and in no distress. She is an excellent historian with no dysarthria or aphasia. There are no clear cranial neuropathies or lateralizing signs. ROS:    Review of Systems   Constitutional:  Negative for appetite change, fatigue and fever. HENT: Negative. Negative for hearing loss, tinnitus, trouble swallowing and voice change. Eyes: Negative. Negative for photophobia, pain and visual disturbance. Respiratory: Negative. Negative for shortness of breath. Cardiovascular: Negative. Negative for palpitations. Gastrointestinal: Negative. Negative for nausea and vomiting. Endocrine: Negative. Negative for cold intolerance. Genitourinary: Negative. Negative for dysuria, frequency and urgency. Musculoskeletal:  Negative for back pain, gait problem, myalgias and neck pain. Skin: Negative. Negative for rash. Allergic/Immunologic: Negative. Neurological: Negative. Negative for dizziness, tremors, seizures, syncope, facial asymmetry, speech difficulty, weakness, light-headedness, numbness and headaches. Hematological: Negative. Does not bruise/bleed easily. Psychiatric/Behavioral: Negative. Negative for confusion, hallucinations and sleep disturbance. I have spent a total time of 38 minutes on 10/27/23 in caring for this patient including Risks and benefits of tx options, Instructions for management, Impressions, Counseling / Coordination of care, Documenting in the medical record, and Obtaining or reviewing history  .

## 2023-10-24 NOTE — PATIENT INSTRUCTIONS
Chronic migraine: That presents for a follow-up evaluation with regard to chronic migraine headaches. She continues to experience more or less daily headache which can be activity related, she also experiences more significant breakthrough migraines every few weeks in addition to any time when she is more fluid overloaded or around her menstrual cycle. She reports that her migraines are better when she has reduced drainage/irritation in the sinuses. -We will plan to continue her typical migraine preventative medicines including Celebrex, gabapentin, Emgality, propranolol, Nurtec, Topamax  -I have sent a message to the nephrology group at her request to discuss the possibility of a repeat trial of Diamox given that she was only able to tolerate it for a very short time. If that is a reasonable option for her we will work with the nephrology group with regard to dosing and whether or not she should be taking Lasix/spironolactone at the same time. If she is unable to take Diamox without reducing her other diuretic medicines we would likely consider switching Emgality to Vyepti infusion  -To treat a headache as it occurs she could use either 1 tablet of Nurtec if she has not already taken it that day, or 1 tablet of Maxalt. Maxalt can be repeated once in 2 hours with a maximum of 3 tablets in any 24-hour timeframe. Either medication could be taken with Tylenol or Benadryl as she sees fit. -I would like for her to keep track of her migraines, particularly the more severe breakthrough migraines, so that we can determine any specific patterns that can be used to our benefit in getting this under reasonable control    She will return to the office in 4 months time to see me directly but I will be in touch with her soon as we hear back from the nephrology group.

## 2023-10-26 ENCOUNTER — OFFICE VISIT (OUTPATIENT)
Dept: BARIATRICS | Facility: CLINIC | Age: 38
End: 2023-10-26
Payer: COMMERCIAL

## 2023-10-26 ENCOUNTER — TELEPHONE (OUTPATIENT)
Dept: BARIATRICS | Facility: CLINIC | Age: 38
End: 2023-10-26

## 2023-10-26 VITALS
RESPIRATION RATE: 20 BRPM | BODY MASS INDEX: 45.99 KG/M2 | SYSTOLIC BLOOD PRESSURE: 122 MMHG | DIASTOLIC BLOOD PRESSURE: 76 MMHG | HEART RATE: 72 BPM | HEIGHT: 67 IN | OXYGEN SATURATION: 97 % | WEIGHT: 293 LBS | TEMPERATURE: 97.9 F

## 2023-10-26 DIAGNOSIS — E66.01 MORBID OBESITY (HCC): Primary | ICD-10-CM

## 2023-10-26 DIAGNOSIS — F32.A DEPRESSION: ICD-10-CM

## 2023-10-26 DIAGNOSIS — G47.33 OSA (OBSTRUCTIVE SLEEP APNEA): ICD-10-CM

## 2023-10-26 PROCEDURE — 99214 OFFICE O/P EST MOD 30 MIN: CPT | Performed by: PHYSICIAN ASSISTANT

## 2023-10-26 RX ORDER — CLINDAMYCIN HYDROCHLORIDE 300 MG/1
300 CAPSULE ORAL 2 TIMES DAILY
COMMUNITY
Start: 2023-09-24

## 2023-10-26 NOTE — TELEPHONE ENCOUNTER
Prior auth started for patients Teena Velazquez ([place]: [facility]-BEHAVIORAL HEALTH DEPARTMENT) - [id number]  [id number] 1MG/0.5ML auto-injectors

## 2023-10-26 NOTE — PATIENT INSTRUCTIONS
Goals: Food log (ie.) [email or web address],etc.   No sugary beverages. At least 64oz of water daily. Increase physical activity by 10 minutes daily. Gradually increase physical activity to a goal of 5 days per week for 30 minutes of MODERATE intensity PLUS 2 days per week of FULL BODY resistance training  No sugary beverages. At least 64oz of water daily.   5-10 servings of fruits and vegetables per day  25-35 grams of dietary fiber per day

## 2023-10-26 NOTE — ASSESSMENT & PLAN NOTE
-Patient is pursuing Conservative Program  -Initial weight loss goal of 5-10% weight loss for improved health  -Screening labs: reviewed, up to date  -dietary/lifestyle changes, continue to work with [facility]  -On Topamax and Wellbutrin already   -avoid Phentermine  -On Ozempic 0.5mg. Will increase to Wegovy 1mg  -dietary recall reviewed.   -She will work on increasing water to goal, reducing artificially sweetened drinks and increasing fruit and veggies  -medication agreement signed    Initial: 421 lbs  Last OV: 389.4 lbs  Current: 376 lbs  Change: -45 lbs  Goal: wants to feel healthy

## 2023-10-26 NOTE — ASSESSMENT & PLAN NOTE
-not currently using CPAP  -encouraged her to treat this condition, discussed risks of untreated CAIO

## 2023-10-26 NOTE — ASSESSMENT & PLAN NOTE
-hX OCD, EILEEN  -followed by psychiatry  -hx Binge eating - on Vyvanse  -Remains on Wellbutrin and Vraylar

## 2023-10-26 NOTE — PROGRESS NOTES
Assessment/Plan: Morbid obesity ([address] )  -Patient is pursuing Conservative Program  -Initial weight loss goal of 5-10% weight loss for improved health  -Screening labs: reviewed, up to date  -dietary/lifestyle changes, continue to work with [facility]  -On Topamax and Wellbutrin already   -avoid Phentermine  -On Ozempic 0.5mg. Will increase to Wegovy 1mg  -dietary recall reviewed. -She will work on increasing water to goal, reducing artificially sweetened drinks and increasing fruit and veggies  -medication agreement signed    Initial: 421 lbs  Last OV: 389.4 lbs  Current: 376 lbs  Change: -45 lbs  Goal: wants to feel healthy    CAIO (obstructive sleep apnea)  -not currently using CPAP  -encouraged her to treat this condition, discussed risks of untreated CAIO    Depression  -hX OCD, EILEEN  -followed by psychiatry  -hx Binge eating - on Vyvanse  -Remains on Wellbutrin and Vraylar      Goals:    Food log (ie.) [email or web address],etc.   No sugary beverages. At least 64oz of water daily. Increase physical activity by 10 minutes daily. Gradually increase physical activity to a goal of 5 days per week for 30 minutes of MODERATE intensity PLUS 2 days per week of FULL BODY resistance training  No sugary beverages. At least 64oz of water daily. 5-10 servings of fruits and vegetables per day  25-35 grams of dietary fiber per day      Follow up in approximately 3 months with Non-Surgical Physician/Advanced Practitioner. Diagnoses and all orders for this visit:    Morbid obesity ([address] [facility])  -     Semaglutide-Weight Management (WEGOVY) 1 MG/0.5ML; Inject 0.5 mL (1 mg total) under the skin once a week for 4 doses    CAIO (obstructive sleep apnea)    Depression    Other orders  -     clindamycin (CLEOCIN) 300 MG capsule;  Take 300 mg by mouth 2 (two) times a day          Subjective:   Chief Complaint   Patient presents with    Follow-up        Patient ID: Sara Bianchi  is a 43 y.o. female with excess weight/obesity here to pursue weight managment. Patient is pursuing Conservative Program.     HPI Patient presents for [facility] follow up. On Ozempic 0.5mg weekly and tolerating well.     -Taking Colace and Linzess - moving bowels daily  -Hydration: water 1-2 bottles, decaf diet iced tea, Decaf diet soda, coffee + flavored creamer  -Not currently wearing CPAP, sleep 8 hours  -was snacking on ice cream a lot through out the summer, not eating this as often now. Reports she was having ice cream for dinner    -Remains on Wellbutrin under Psychiatry care    B: Life cereal + 2% milk or Toast + PB and J  S: skips  L: Deli Lunch meat/cheese sandwich, not going out as much for lunch  S: skips  D: Breaded chicken + broccoli/brussel sprouts  S: skips    Wt Readings from Last 10 Encounters:   10/26/23 (!) 171 kg (376 lb)   10/24/23 (!) 171 kg (376 lb 8 oz)   09/22/23 (!) 172 kg (379 lb 3.2 oz)   09/21/23 (!) 172 kg (379 lb 3.2 oz)   08/05/23 (!) 176 kg (387 lb)   08/02/23 (!) 176 kg (389 lb)   07/20/23 (!) 177 kg (389 lb 6.4 oz)   05/25/23 (!) 180 kg (396 lb)   05/18/23 (!) 179 kg (394 lb 8 oz)   04/25/23 (!) 183 kg (404 lb 1.7 oz)        The following portions of the patient's history were reviewed and updated as appropriate: allergies, current medications, past family history, past medical history, past social history, past surgical history, and problem list.    Review of Systems   Cardiovascular:  Negative for chest pain and palpitations. Gastrointestinal:  Negative for constipation, nausea and vomiting. Psychiatric/Behavioral:  Positive for dysphoric mood (improved since last visit). Objective:    /76 (BP Location: Left arm, Patient Position: Sitting, Cuff Size: Large)   Pulse 72   Temp 97.9 °F (36.6 °C)   Resp 20   Ht 5' 7" (1.702 m)   Wt (!) 171 kg (376 lb)   SpO2 97%   BMI 58.89 kg/m²      Physical Exam  Vitals and nursing note reviewed.    Constitutional:       General: She is not in acute distress. Appearance: She is obese. She is not ill-appearing or toxic-appearing. HENT:      Head: Normocephalic and atraumatic. Mouth/Throat:      Mouth: Mucous membranes are moist.   Eyes:      General: No scleral icterus. Pulmonary:      Effort: Pulmonary effort is normal. No respiratory distress. Abdominal:      Comments: Obese, protuberant   Skin:     General: Skin is warm. Neurological:      General: No focal deficit present. Mental Status: She is alert and oriented to person, place, and time. Psychiatric:         Behavior: Behavior normal.         Thought Content:  Thought content normal.         Judgment: Judgment normal.

## 2023-10-30 ENCOUNTER — TELEPHONE (OUTPATIENT)
Dept: NEUROLOGY | Facility: CLINIC | Age: 38
End: 2023-10-30

## 2023-10-30 ENCOUNTER — PATIENT MESSAGE (OUTPATIENT)
Dept: BARIATRICS | Facility: CLINIC | Age: 38
End: 2023-10-30

## 2023-10-30 NOTE — TELEPHONE ENCOUNTER
Called pt. No answer. Left her a message to call the office back, or respond to the [email or web address] message that was sent.

## 2023-10-30 NOTE — TELEPHONE ENCOUNTER
----- Message from Blair Edmond MD sent at 10/30/2023 11:58 AM EDT -----  Please call Elizabeth,    After hearing back from nephrology, it looks like we would be able to stop the Lasix and keep her only on Aldactone and Diamox if we were going to do a Diamox trial.  We will need to do some lab monitoring for increased acid which always happens with Diamox. If she still wants to do the Diamox trial let me know and I will send a prescription for the medication along with the appropriate lab work. Just let me know.  ----- Message -----  From: LEIGHA Vargas  Sent: 10/25/2023   8:21 AM EDT  To: Blair Edmond MD    Good morning,    If she restarts Diamox then I recommend to hold lasix and monitor for acidosis. Okay to continue spironolactone as I believe we are treating her for hyperaldosteronism. As far as impact from anticholinergic, I think she will be able to tolerate it from a fluid perspective but of course will leave monitoring side effect to your expertise and will also continue to follow with her. Have a great day,  Natalie  ----- Message -----  From: Blair Edmond MD  Sent: 10/24/2023   3:53 PM EDT  To: LEIGHA Vargas,    I saw this in the office today for her migraines. We are going to make some adjustments but because her prior experience with Diamox was so short-lived she has some interest in trialing that medication once again. Would have to check in with you and see if she were to go on Diamox if she would need to remain on furosemide/spironolactone as well, and would there be a concern with Diamox from a renal standpoint? The other medication we discussed today which she is not going to trial at the moment is an antihistamine called cyproheptadine. Because it can have some anticholinergic effects I was just curious if that would have an impact on fluid retention for that we would need to be concerned about?     Thank you much!cristian Ramires

## 2023-10-31 DIAGNOSIS — R73.03 PREDIABETES: Primary | ICD-10-CM

## 2023-11-01 NOTE — PROGRESS NOTES
"O2 2 lpm/NC SpO2 96 %. BS diminished bilat, Duoneb/Volara tx CPEP 12 cm x 3 min, CHFO 22 cm x 8 min. Tolerated fair, doesn't like the Volara tx. Will discuss with MD.      BP (!) 88/54   Pulse 84   Temp 97.5  F (36.4  C)   Resp 15   Ht 1.575 m (5' 2\")   Wt 113.1 kg (249 lb 6.4 oz)   LMP  (LMP Unknown)   SpO2 97%   BMI 45.62 kg/m      BS after unchanged. RT to follow.  " PT Evaluation  and PT Discharge    Today's date: 1/3/2019  Patient name: Anushka Ron  : [birth year]  MRN: [id number]  Referring provider: Kandice Hernandez DO  Dx:   Encounter Diagnosis     ICD-10-CM    1  Pelvic floor dysfunction M62 89                   Assessment  Assessment details: Anushka Ron is a 34 y  o returns demonstrating improved PFM power and holding endurance however not to the level that she presented when she was exercising regularly  (Initial power assessment was 1  9uV in 2018, her best measurements were 6  8uV in late May 2018 and today she is measuring 3 5uV)  Her urinary symptoms are not particularly bothersome to her overall quality of life at this point and for this reason we reviewed exercises that she can resume, with a recommendation of performing 3 times per week  She will be discharged to self management  Impairments: impaired physical strength    Plan  Duration in visits: 1  Treatment plan discussed with: patient        Subjective Evaluation    History of Present Illness  Mechanism of injury: Patient returns after 6 months for a PFM check  She has stopped her urge suppression medication as she has been doing well without it  She continues with concern about nocturia 2-3/night and she does have some leakage at night  She feels thirsty frequently due to medication  She admits that she has not been regularly performing PFPT exercises and is relatively pleased with bladder control  She continues with month nerve stimulation for bladder control  Recurrent probem    Quality of life: good    Pain  Current pain ratin  At best pain ratin  At worst pain ratin    Social Support  Lives in: multiple-level home  Lives with: friend      Employment status: working (SSD x 10 years for psychiatric disorders)    Diagnostic Tests  No diagnostic tests performed  Treatments  No previous or current treatments  Patient Goals  Patient goal: assess PFM control as compared with 7 mos ago when she began treatment (Decrease bladder urgency/frequency at night)        Objective   Pelvic Floor Exam     External Exam     Visualization of PFM Contraction:   PFM Contraction Comments: Patient deferred PFM examination in lieu of biofeedback only today  Pelvic Floor Muscle Control     PERF   Power left: 3/5    SMEG Biofeedback   Sensor used: external sensors placed perianally  Positioning: hooklying    endurance protocol   Secs work/Secs rest/Reps: 5/10/10  Resting average (microvolts): 0 9  Working average (microvolts): 3 5  Recruitment: moderate  Holding ability: fair  Derecruitment: good      Flowsheet Rows      Most Recent Value   PT/OT G-Codes   Current Score  36   Projected Score  30   FOTO information reviewed  Yes   Assessment Type  Evaluation & Discharge same visit   G code set  Other PT/OT Primary   Other PT Primary Current Status ()  CJ   Other PT Primary Goal Status ()  CJ   Other PT Primary Discharge Status ()  CJ        Treatment on 1/3/19:  NMR x 40' for biofeedback and muscle training  Therapeutic activities x 15' for discussion of ongoing symptom management and independent exercise recommendations

## 2023-11-02 DIAGNOSIS — E66.01 MORBID OBESITY (HCC): Primary | ICD-10-CM

## 2023-11-02 NOTE — PATIENT COMMUNICATION
Patient called stating that [address] [facility] has 1mg of Wegovy. Can you send this in for her ASAP? She is at an appt right next door to the pharmacy.

## 2023-11-02 NOTE — PATIENT COMMUNICATION
Pt returned call, she will call those pharmacies and then will call us back with an update.    Also- appointment has been R/S.

## 2023-11-02 NOTE — PATIENT COMMUNICATION
Patient had tried all of the options for both wegovy and ozempic that lucho has recommended and none of them have it. Patient wants to know what else she could do?

## 2023-11-02 NOTE — TELEPHONE ENCOUNTER
Recommend trying [facility] pharmacy otherwise would have to remain off of it for now.

## 2023-11-02 NOTE — PATIENT COMMUNICATION
Left message for patient to call back to discuss. Patient also need to RS appt scheduled on 1/25 due to provider being out of office.

## 2023-11-04 DIAGNOSIS — G43.709 CHRONIC MIGRAINE WITHOUT AURA WITHOUT STATUS MIGRAINOSUS, NOT INTRACTABLE: Primary | ICD-10-CM

## 2023-11-04 RX ORDER — ACETAZOLAMIDE 250 MG/1
TABLET ORAL
Qty: 120 TABLET | Refills: 2 | Status: SHIPPED | OUTPATIENT
Start: 2023-11-04

## 2023-11-06 NOTE — TELEPHONE ENCOUNTER
Brittany Grier MD  You; Neurology [facility] Clinical Team 42 days ago       Very well,    We will start with Diamox 250 mg tablets and she will use 1 tablet twice per day for the first week or 2, and after that she can increase to 500 mg or 2 tablets twice per day. She should continue spironolactone but should discontinue/stop Lasix. She will need to watch to be sure she is not accumulating fluid with the shift. We will plan to check her labs in 2 weeks.

## 2023-11-06 NOTE — TELEPHONE ENCOUNTER
Called pt. Left a message on her voice mail requesting a return call. Also sent a message to pt through [address] [place] [address].

## 2023-11-08 ENCOUNTER — OFFICE VISIT (OUTPATIENT)
Dept: NEPHROLOGY | Facility: CLINIC | Age: 38
End: 2023-11-08

## 2023-11-08 VITALS
HEIGHT: 67 IN | HEART RATE: 71 BPM | BODY MASS INDEX: 45.99 KG/M2 | DIASTOLIC BLOOD PRESSURE: 86 MMHG | WEIGHT: 293 LBS | OXYGEN SATURATION: 99 % | SYSTOLIC BLOOD PRESSURE: 138 MMHG

## 2023-11-08 DIAGNOSIS — R60.0 BILATERAL LOWER EXTREMITY EDEMA: ICD-10-CM

## 2023-11-08 DIAGNOSIS — E26.9 HYPERALDOSTERONISM (HCC): ICD-10-CM

## 2023-11-08 DIAGNOSIS — G43.709 CHRONIC MIGRAINE WITHOUT AURA WITHOUT STATUS MIGRAINOSUS, NOT INTRACTABLE: ICD-10-CM

## 2023-11-08 DIAGNOSIS — G44.89 HEADACHE ASSOCIATED WITH HORMONAL FACTORS: Primary | ICD-10-CM

## 2023-11-08 RX ORDER — FUROSEMIDE 40 MG/1
40 TABLET ORAL AS NEEDED
Qty: 135 TABLET | Refills: 1 | Status: SHIPPED | OUTPATIENT
Start: 2023-11-08

## 2023-11-08 NOTE — PATIENT INSTRUCTIONS
It was nice to see you today. Please check BMP 1-2 weeks after starting Diamox (lab work ordered by Dr. Samreen Bonner).  Use lasix as needed for swelling/bloating

## 2023-11-08 NOTE — PROGRESS NOTES
Nephrology   Office Follow-Up  Radha Kat 43 y.o. female MRN: [id number]    Encounter: [id number]        [address] was seen in the [facility] office today. All diagnoses and orders for visit:     Idiopathic intracranial hypertension  Used Diamox when she was younger however had significant dry mouth therefore switched to lasix. She is willing to try again therefore she will stop lasix and use Diamox 250 mg BID and bmp to be checked 1-2 weeks after. She is aware she should not become pregnant on this medication. Will need to monitor for acidosis and electrolyte imbalance. She may use lasix as needed for additional swelling and bloating. Hypertension  Blood pressure goal <130/80 mmHg. Blood pressure mildly elevated. She was initiated on spironolactone in 2022 in addition to lasix. Could consider holding spironolactone and checking aldosterone-renin ratio. Will follow trends with Diamox. Lower extremity edema  No significant edema on exam however she states she feels swollen. She may use lasix as needed for additional edema but will message/call if she has to use frequently. HPI: Radha Kat is a 43 y.o. female who established with nephrology in 2021 for diuretic management in the setting of chronic migraine, idiopathic intracranial hypertension. She had previous been on Diamox however could not tolerate xerostomia therefore it was changed to lasix. Spironolactone was later added in 2022 and her IUD was removed. She follows closely with  nephrology and also has appt at [facility] scheduled for January 2024. She presents today to discuss restarting Diamox. Her concern is for worsening edema that usually happens around her menstrual cycle. We will stop standing lasix (typically takes between 20-40 mg daily) and start Diamox 250 mg BID and check labs in 1-2 weeks. Monitor for acidosis and electrolyte imbalance. Will then discuss increasing dose to 500 mg bid.  She is aware she cannot become pregnant on this medication. She may use lasix 20 mg as needed for additional swelling but she will message me if she has to use frequently. Can also consider holding spironolactone and then truly evaluating for hyperaldosteronism. Adrenal glands normal on enhanced imaging done in 2020. ROS:   Review of Systems   Constitutional:  Negative for chills and fever. HENT:  Negative for ear pain and sore throat. Eyes:  Negative for pain and visual disturbance. Respiratory:  Negative for cough and shortness of breath. Cardiovascular:  Positive for leg swelling. Negative for chest pain and palpitations. Gastrointestinal:  Negative for abdominal pain and vomiting. Genitourinary:  Positive for frequency. Negative for dysuria and hematuria. Musculoskeletal:  Negative for arthralgias and back pain. Skin:  Negative for color change and rash. Neurological:  Positive for headaches. Negative for seizures and syncope. All other systems reviewed and are negative.       Allergies: Doxycycline and Other    Medications:   Current Outpatient Medications:     acetaZOLAMIDE (DIAMOX) 250 mg tablet, 1 tab BID X 1 week, then 2 tabs BID, Disp: 120 tablet, Rfl: 2    buPROPion (WELLBUTRIN XL) 300 mg 24 hr tablet, Take 300 mg by mouth daily, Disp: , Rfl:     celecoxib (CeleBREX) 200 mg capsule, take 1 capsule by mouth twice a day, Disp: 60 capsule, Rfl: 4    Cequa 0.09 % SOLN, Apply 1 drop to eye 2 (two) times a day, Disp: 60 each, Rfl: 0    cholecalciferol (VITAMIN D3) 1,000 units tablet, Take 5,000 Units by mouth daily, Disp: , Rfl:     docusate sodium (COLACE) 100 mg capsule, Take 1 capsule (100 mg total) by mouth 2 (two) times a day (Patient taking differently: Take 100 mg by mouth if needed), Disp: 60 capsule, Rfl: 0    fluticasone (FLONASE) 50 mcg/act nasal spray, 2 sprays into each nostril 2 (two) times a day, Disp: 16 g, Rfl: 0    folic acid (FOLVITE) 1 mg tablet, Take 1 mg by mouth daily, Disp: , Rfl:     furosemide (LASIX) 40 mg tablet, Take 1 tablet (40 mg total) by mouth if needed (take 40 mg as needed for swelling), Disp: 135 tablet, Rfl: 1    gabapentin (Neurontin) 600 MG tablet, Take 1 tablet (600 mg total) by mouth 2 (two) times a day (Patient taking differently: Take 600 mg by mouth 2 (two) times a day Half a tab in the morning and 1.5 tabs in the evening), Disp: 60 tablet, Rfl: 0    Galcanezumab-gnlm (Emgality) 120 MG/ML SOAJ, Inject 120 mg under the skin every 30 (thirty) days, Disp: 3 mL, Rfl: 3    levothyroxine 125 mcg tablet, Take 1 tablet (125 mcg total) by mouth daily in the early morning Do not start before August 6, 2023., Disp: 30 tablet, Rfl: 0    linaCLOtide (Linzess) 290 MCG CAPS, Take 1 capsule by mouth daily, Disp: 90 capsule, Rfl: 0    loratadine (CLARITIN) 10 mg tablet, Take 1 tablet (10 mg total) by mouth daily, Disp: 30 tablet, Rfl: 0    LORazepam (ATIVAN) 0.5 mg tablet, Take 0.5 mg by mouth 2 (two) times a day as needed for anxiety, Disp: , Rfl:     metFORMIN (GLUCOPHAGE) 500 mg tablet, Take 1 tablet (500 mg total) by mouth daily with breakfast, Disp: 90 tablet, Rfl: 0    Multiple Vitamin (MULTIVITAMIN) capsule, Take 1 capsule by mouth daily, Disp: , Rfl:     Nurtec 75 MG TBDP, TAKE 75MG BY MOUTH EVERY OTHER DAY.   IF THERE IS A MIGRAINE ON A NON-NURTEC DAY ,YOU CAN TAKE A DOSE AND SKIP THE FOLLOWING DAY, Disp: 16 tablet, Rfl: 2    omeprazole (PriLOSEC) 20 mg delayed release capsule, Take 1 capsule (20 mg total) by mouth 2 (two) times a day, Disp: 180 capsule, Rfl: 0    PARoxetine (PAXIL) 40 MG tablet, Take 1 tablet (40 mg total) by mouth daily Do not start before August 8, 2023., Disp: 30 tablet, Rfl: 0    propranolol (INDERAL) 20 mg tablet, Take 20 mg by mouth every 12 (twelve) hours, Disp: , Rfl:     rizatriptan (MAXALT) 10 mg tablet, Take 1 tablet (10 mg total) by mouth once as needed for migraine May repeat every 2 hours if needed, max 30mg/24 hours, Disp: 12 tablet, Rfl: 3    rOPINIRole (REQUIP) 4 mg tablet, Take 4 mg by mouth daily at bedtime, Disp: , Rfl:     Semaglutide-Weight Management (WEGOVY) 1 MG/0.5ML, Inject 0.5 mL (1 mg total) under the skin once a week for 4 doses, Disp: 2 mL, Rfl: 0    spironolactone (ALDACTONE) 50 mg tablet, Take 1 tablet (50 mg total) by mouth daily, Disp: 90 tablet, Rfl: 0    topiramate (TOPAMAX) 100 mg tablet, Take 1.5 tablets (150 mg total) by mouth 2 (two) times a day, Disp: 270 tablet, Rfl: 1    Vraylar 3 MG capsule, Take 3 mg by mouth daily Daily in the evening, Disp: , Rfl:     Vraylar 6 MG capsule, Take 6 mg by mouth daily Daily in the morning, Disp: , Rfl:     Vyvanse 70 MG capsule, Take 70 mg by mouth daily, Disp: , Rfl:     clindamycin (CLEOCIN) 300 MG capsule, Take 300 mg by mouth 2 (two) times a day (Patient not taking: Reported on 11/8/2023), Disp: , Rfl:     melatonin 3 mg, Take 1 tablet (3 mg total) by mouth daily at bedtime (Patient not taking: Reported on 9/21/2023), Disp: 30 tablet, Rfl: 0    Past Medical History:   Diagnosis Date    Allergic     Allergic rhinitis     Anorexia nervosa in remission     Anxiety     Arthritis 5/8/2014    Back pain     Bipolar disorder ([address])     Depression     Disease of thyroid gland     Diverticulitis of colon 9/20/2015    Dizziness     Ear problems     Eating disorder     Esophageal reflux 08/15/2013    GERD (gastroesophageal reflux disease) 8/15/2013    Headache(784.0)     Headache, tension-type     Hypertension     Hypothyroid     Idiopathic intracranial hypertension     Lumbar degenerative disc disease 05/08/2014    Migraine     Nasal congestion     Nosebleed     Obesity     Obsessive-compulsive disorder     Otitis media     Overactive bladder     Psychiatric disorder     Restless leg syndrome, controlled     Seasonal allergies     Sinusitis     Sleep apnea     Sleep difficulties     Suicide and self-inflicted injury ([address])     Tinnitus     TMJ dysfunction     Tonsillitis     Transcranial Magnetic Stimulation 09/06/2021    Urinary tract infection     Vision loss      Past Surgical History:   Procedure Laterality Date    DENTAL SURGERY      wisdom teeth-at [age] years. Other surgery dental extraxtion of bone spur (10 years ago)    IR LUMBAR PUNCTURE  02/26/2019    SINUS ENDOSCOPY      SINUS SURGERY      TONSILLECTOMY       Family History   Problem Relation Age of Onset    Mental illness Mother     Depression Mother     Suicide Attempts Mother     Hypertension Mother     Diabetes Mother     Other Mother         bicuspid aortic valve    Anxiety disorder Mother     Psychiatric Illness Mother     Schizoaffective Disorder  Mother     Anemia Mother     Hypertension Father     Hypothyroidism Father     Thyroid disease Father     Hypertension Brother     Cancer Maternal Grandmother     Heart disease Maternal Grandmother     Stroke Maternal Grandmother     Breast cancer Maternal Grandmother     Skin cancer Maternal Grandmother     Arthritis Maternal Grandmother     Pneumonia Maternal Grandfather     Cancer Maternal Grandfather     Dementia Maternal Grandfather     COPD Maternal Grandfather     Cancer Paternal Grandmother     Pneumonia Paternal Grandfather     Arthritis Family     Breast cancer Family     Osteopenia Family     Osteoporosis Family     Hypertension Family     Transient ischemic attack Family       reports that she has never smoked. She has never been exposed to tobacco smoke. She has never used smokeless tobacco. She reports that she does not drink alcohol and does not use drugs. Physical Exam:   Vitals:    11/08/23 0901   BP: 138/86   BP Location: Left arm   Patient Position: Sitting   Pulse: 71   SpO2: 99%   Weight: (!) 170 kg (374 lb 6.4 oz)   Height: 5' 7" (1.702 m)     Body mass index is 58.64 kg/m².     General: conscious, cooperative, in no acute distress, appears stated age  Eyes: conjunctivae pale, anicteric sclerae  ENT: lips and mucous membranes moist  Neck: supple, no JVD, no masses  Chest:  essentially clear breath sounds bilaterally, no crackles, ronchus or wheezings  CVS: S1 & S2, normal rate, regular rhythm  Abdomen: soft, non-tender, non-distended, normoactive bowel sounds, rounded obese  Extremities: trace  edema of both legs  Skin: no rash   Neuro: awake, alert, oriented       Diagnostic Data:  Lab: I have personally reviewed pertinent lab results. ,   CBC:       CMP: No results found for: "SODIUM", "K", "CL", "CO2", "ANIONGAP", "BUN", "CREATININE", "GLUCOSE", "CALCIUM", "AST", "ALT", "ALKPHOS", "PROT", "BILITOT", "EGFR",   PT/INR: No results found for: "PT", "INR",   Magnesium: No components found for: "MAG",  Phosphorous: No results found for: "PHOS"    Patient Instructions   It was nice to see you today. Please check BMP 1-2 weeks after starting Diamox (lab work ordered by Dr. Renée Barrera). Use lasix as needed for swelling/bloating    Portions of the record may have been created with voice recognition software. Occasional wrong word or "sound a like" substitutions may have occurred due to the inherent limitations of voice recognition software. Read the chart carefully and recognize, using context, where substitutions have occurred. If you have any questions, please contact the dictating provider.

## 2023-11-10 PROBLEM — R60.0 BILATERAL LOWER EXTREMITY EDEMA: Status: ACTIVE | Noted: 2023-11-10

## 2023-11-11 DIAGNOSIS — G43.709 CHRONIC MIGRAINE WITHOUT AURA WITHOUT STATUS MIGRAINOSUS, NOT INTRACTABLE: ICD-10-CM

## 2023-11-14 RX ORDER — CELECOXIB 200 MG/1
200 CAPSULE ORAL 2 TIMES DAILY
Qty: 60 CAPSULE | Refills: 1 | Status: SHIPPED | OUTPATIENT
Start: 2023-11-14

## 2023-11-19 ENCOUNTER — HOSPITAL ENCOUNTER (EMERGENCY)
Facility: HOSPITAL | Age: 38
Discharge: HOME | End: 2023-11-19
Attending: EMERGENCY MEDICINE | Admitting: EMERGENCY MEDICINE
Payer: COMMERCIAL

## 2023-11-19 VITALS
SYSTOLIC BLOOD PRESSURE: 144 MMHG | DIASTOLIC BLOOD PRESSURE: 91 MMHG | OXYGEN SATURATION: 100 % | HEART RATE: 71 BPM | BODY MASS INDEX: 45.99 KG/M2 | TEMPERATURE: 97.8 F | HEIGHT: 67 IN | WEIGHT: 293 LBS | RESPIRATION RATE: 18 BRPM

## 2023-11-19 DIAGNOSIS — K52.9 GASTROENTERITIS: Primary | ICD-10-CM

## 2023-11-19 LAB
FLUAV RNA RESP QL NAA+PROBE: NEGATIVE
FLUBV RNA RESP QL NAA+PROBE: NEGATIVE
RSV RNA RESP QL NAA+PROBE: NEGATIVE
SARS-COV-2 RNA RESP QL NAA+PROBE: NEGATIVE

## 2023-11-19 PROCEDURE — 96372 THER/PROPH/DIAG INJ SC/IM: CPT

## 2023-11-19 PROCEDURE — 99284 EMERGENCY DEPT VISIT MOD MDM: CPT

## 2023-11-19 PROCEDURE — 99284 EMERGENCY DEPT VISIT MOD MDM: CPT | Performed by: EMERGENCY MEDICINE

## 2023-11-19 PROCEDURE — 0241U HB NFCT DS VIR RESP RNA 4 TRGT: CPT

## 2023-11-19 RX ORDER — ONDANSETRON 4 MG/1
4 TABLET, FILM COATED ORAL EVERY 6 HOURS
Qty: 12 TABLET | Refills: 0 | Status: SHIPPED | OUTPATIENT
Start: 2023-11-19

## 2023-11-19 RX ORDER — METOCLOPRAMIDE HYDROCHLORIDE 5 MG/ML
10 INJECTION INTRAMUSCULAR; INTRAVENOUS ONCE
Status: CANCELLED | OUTPATIENT
Start: 2023-11-19 | End: 2023-11-19

## 2023-11-19 RX ORDER — ONDANSETRON 4 MG/1
4 TABLET, ORALLY DISINTEGRATING ORAL ONCE
Status: COMPLETED | OUTPATIENT
Start: 2023-11-19 | End: 2023-11-19

## 2023-11-19 RX ORDER — KETOROLAC TROMETHAMINE 30 MG/ML
15 INJECTION, SOLUTION INTRAMUSCULAR; INTRAVENOUS ONCE
Status: COMPLETED | OUTPATIENT
Start: 2023-11-19 | End: 2023-11-19

## 2023-11-19 RX ORDER — LOPERAMIDE HYDROCHLORIDE 2 MG/1
2 CAPSULE ORAL 4 TIMES DAILY PRN
Qty: 12 CAPSULE | Refills: 0 | Status: SHIPPED | OUTPATIENT
Start: 2023-11-19

## 2023-11-19 RX ORDER — KETOROLAC TROMETHAMINE 30 MG/ML
15 INJECTION, SOLUTION INTRAMUSCULAR; INTRAVENOUS ONCE
Status: CANCELLED | OUTPATIENT
Start: 2023-11-19 | End: 2023-11-19

## 2023-11-19 RX ADMIN — KETOROLAC TROMETHAMINE 15 MG: 30 INJECTION, SOLUTION INTRAMUSCULAR at 18:28

## 2023-11-19 RX ADMIN — ONDANSETRON 4 MG: 4 TABLET, ORALLY DISINTEGRATING ORAL at 18:27

## 2023-11-19 NOTE — ED ATTENDING ATTESTATION
11/19/2023  INancy, DO, saw and evaluated the patient. I have discussed the patient with the resident/non-physician practitioner and agree with the resident's/non-physician practitioner's findings, Plan of Care, and MDM as documented in the resident's/non-physician practitioner's note, except where noted. All available labs and Radiology studies were reviewed. I was present for key portions of any procedure(s) performed by the resident/non-physician practitioner and I was immediately available to provide assistance. At this point I agree with the current assessment done in the Emergency Department. I have conducted an independent evaluation of this patient a history and physical is as follows:    41yo female presents for abdominal pain. Symptoms starting 3-4 days ago. About 2 wks switched from ozempic to wygovy, supply issues and dose changes. Decreased appetite with switch as well as a migraine then ate [employer] on Thursday and began with GI upset, diarrhea, and episode of vomiting yesterday. Feels somewhat improved today, tolerated PO however had diarrhea episode. She experienced some nausea but without vomiting. Diarrhea non-bloody, dark. Denies fevers, abdominal pain, cold symptoms. Reports similar symptoms with COVID infection, took a home test that was negative. Symptoms possibly due to gastroenteritis or viral infection, possibly COVID although denies other symptoms. Given well appearance and non-tender abdomen, doubt other etiologies such as pancreatitis or drug-induced pancreatitis, biliary disease, bowel obstruction. Patient denies RF for cdiff infection, also doubtful given history. We discussed trial discharge with symptomatic treatments including zofran, immodium and she is agreeable. Physical Exam  Vitals reviewed. Constitutional:       General: She is not in acute distress. Appearance: Normal appearance. She is not ill-appearing or toxic-appearing.    HENT:      Head: Normocephalic and atraumatic. Right Ear: External ear normal.      Left Ear: External ear normal.   Eyes:      General:         Right eye: No discharge. Left eye: No discharge. Cardiovascular:      Rate and Rhythm: Normal rate. Pulmonary:      Effort: Pulmonary effort is normal. No respiratory distress. Abdominal:      General: There is no distension. Palpations: Abdomen is soft. Tenderness: There is no abdominal tenderness. There is no guarding or rebound. Musculoskeletal:         General: No deformity or signs of injury. Skin:     General: Skin is warm. Coloration: Skin is not jaundiced or pale. Neurological:      General: No focal deficit present. Mental Status: She is alert.            ED Course         Critical Care Time  Procedures

## 2023-11-20 NOTE — ED PROVIDER NOTES
History  Chief Complaint   Patient presents with    Diarrhea     Began with "sulfur tasting burps" on Friday which she states the last time that happened she had COVID (states is COVID negative from home test); states has diarrhea after eating, one episode of vomiting. States no abdominal pain upon triage     HPI    Pt is a 44 yo F presenting with nausea, vomiting, and diarrhea. She states symptoms began on Thursday with some intermittent reflux, nausea, and an episode of diarrhea that worsened on Friday and Saturday along with one episode of vomiting Saturday. She did have symptomatic improvement today, but wanted to be evaluated in case she had COVID as last time she had COVID she had similar indigestion and nausea symptoms initially. Of note, pt did have recent change from 0.5mg injection of ozempic to 1.0mg of wegovy 2 weeks ago and did take this injection Thursday. She states in the days prior to onset of symptoms, she had been having issues with her chronic headaches and had little appetite but Thursday did eat a larger amount of [employer] than usual. She has been able to tolerate limited PO intake since, but does get worsening nausea with larger quantities of food. Denies any fevers, chills, upper respiratory symptoms. Diarrhea is non-bloody. Denies abdominal pain at this time. Prior to Admission Medications   Prescriptions Last Dose Informant Patient Reported? Taking? Cequa 0.09 % SOLN  Self No No   Sig: Apply 1 drop to eye 2 (two) times a day   Galcanezumab-gnlm (Emgality) 120 MG/ML SOAJ  Self No No   Sig: Inject 120 mg under the skin every 30 (thirty) days   LORazepam (ATIVAN) 0.5 mg tablet  Self Yes No   Sig: Take 0.5 mg by mouth 2 (two) times a day as needed for anxiety   Multiple Vitamin (MULTIVITAMIN) capsule  Self Yes No   Sig: Take 1 capsule by mouth daily   Nurtec 75 MG TBDP  Self No No   Sig: TAKE 75MG BY MOUTH EVERY OTHER DAY.   IF THERE IS A MIGRAINE ON A NON-NURTEC DAY ,YOU CAN TAKE A DOSE AND SKIP THE FOLLOWING DAY   PARoxetine (PAXIL) 40 MG tablet   No No   Sig: Take 1 tablet (40 mg total) by mouth daily Do not start before 2023.    Semaglutide-Weight Management (WEGOVY) 1 MG/0.5ML   No No   Sig: Inject 0.5 mL (1 mg total) under the skin once a week for 4 doses   Vraylar 3 MG capsule  Self Yes No   Sig: Take 3 mg by mouth daily Daily in the evening   Vraylar 6 MG capsule  Self Yes No   Sig: Take 6 mg by mouth daily Daily in the morning   Vyvanse 70 MG capsule  Self Yes No   Sig: Take 70 mg by mouth daily   acetaZOLAMIDE (DIAMOX) 250 mg tablet   No No   Si tab BID X 1 week, then 2 tabs BID   buPROPion (WELLBUTRIN XL) 300 mg 24 hr tablet  Self Yes No   Sig: Take 300 mg by mouth daily   celecoxib (CeleBREX) 200 mg capsule   No No   Sig: Take 1 capsule (200 mg total) by mouth 2 (two) times a day   cholecalciferol (VITAMIN D3) 1,000 units tablet  Self Yes No   Sig: Take 5,000 Units by mouth daily   clindamycin (CLEOCIN) 300 MG capsule   Yes No   Sig: Take 300 mg by mouth 2 (two) times a day   Patient not taking: Reported on 2023   docusate sodium (COLACE) 100 mg capsule  Self No No   Sig: Take 1 capsule (100 mg total) by mouth 2 (two) times a day   Patient taking differently: Take 100 mg by mouth if needed   fluticasone (FLONASE) 50 mcg/act nasal spray  Self No No   Si sprays into each nostril 2 (two) times a day   folic acid (FOLVITE) 1 mg tablet  Self Yes No   Sig: Take 1 mg by mouth daily   furosemide (LASIX) 40 mg tablet   No No   Sig: Take 1 tablet (40 mg total) by mouth if needed (take 40 mg as needed for swelling)   gabapentin (Neurontin) 600 MG tablet  Self No No   Sig: Take 1 tablet (600 mg total) by mouth 2 (two) times a day   Patient taking differently: Take 600 mg by mouth 2 (two) times a day Half a tab in the morning and 1.5 tabs in the evening   levothyroxine 125 mcg tablet  Self No No   Sig: Take 1 tablet (125 mcg total) by mouth daily in the early morning Do not start before August 6, 2023.    linaCLOtide (Linzess) 290 MCG CAPS  Self No No   Sig: Take 1 capsule by mouth daily   loratadine (CLARITIN) 10 mg tablet  Self No No   Sig: Take 1 tablet (10 mg total) by mouth daily   melatonin 3 mg  Self No No   Sig: Take 1 tablet (3 mg total) by mouth daily at bedtime   Patient not taking: Reported on 9/21/2023   metFORMIN (GLUCOPHAGE) 500 mg tablet  Self No No   Sig: Take 1 tablet (500 mg total) by mouth daily with breakfast   omeprazole (PriLOSEC) 20 mg delayed release capsule  Self No No   Sig: Take 1 capsule (20 mg total) by mouth 2 (two) times a day   propranolol (INDERAL) 20 mg tablet  Self Yes No   Sig: Take 20 mg by mouth every 12 (twelve) hours   rOPINIRole (REQUIP) 4 mg tablet  Self Yes No   Sig: Take 4 mg by mouth daily at bedtime   rizatriptan (MAXALT) 10 mg tablet   No No   Sig: Take 1 tablet (10 mg total) by mouth once as needed for migraine May repeat every 2 hours if needed, max 30mg/24 hours   spironolactone (ALDACTONE) 50 mg tablet  Self No No   Sig: Take 1 tablet (50 mg total) by mouth daily   topiramate (TOPAMAX) 100 mg tablet   No No   Sig: Take 1.5 tablets (150 mg total) by mouth 2 (two) times a day      Facility-Administered Medications: None       Past Medical History:   Diagnosis Date    Allergic     Allergic rhinitis     Anorexia nervosa in remission     Anxiety     Arthritis 5/8/2014    Back pain     Bipolar disorder (HCC)     Depression     Disease of thyroid gland     Diverticulitis of colon 9/20/2015    Dizziness     Ear problems     Eating disorder     Esophageal reflux 08/15/2013    GERD (gastroesophageal reflux disease) 8/15/2013    Headache(784.0)     Headache, tension-type     Hypertension     Hypothyroid     Idiopathic intracranial hypertension     Lumbar degenerative disc disease 05/08/2014    Migraine     Nasal congestion     Nosebleed     Obesity     Obsessive-compulsive disorder     Otitis media     Overactive bladder     Psychiatric disorder Restless leg syndrome, controlled     Seasonal allergies     Sinusitis     Sleep apnea     Sleep difficulties     Suicide and self-inflicted injury ([address])     Tinnitus     TMJ dysfunction     Tonsillitis     Transcranial Magnetic Stimulation 09/06/2021    Urinary tract infection     Vision loss        Past Surgical History:   Procedure Laterality Date    DENTAL SURGERY      wisdom teeth-at [age] years. Other surgery dental extraxtion of bone spur (10 years ago)    IR LUMBAR PUNCTURE  02/26/2019    SINUS ENDOSCOPY      SINUS SURGERY      TONSILLECTOMY         Family History   Problem Relation Age of Onset    Mental illness Mother     Depression Mother     Suicide Attempts Mother     Hypertension Mother     Diabetes Mother     Other Mother         bicuspid aortic valve    Anxiety disorder Mother     Psychiatric Illness Mother     Schizoaffective Disorder  Mother     Anemia Mother     Hypertension Father     Hypothyroidism Father     Thyroid disease Father     Hypertension Brother     Cancer Maternal Grandmother     Heart disease Maternal Grandmother     Stroke Maternal Grandmother     Breast cancer Maternal Grandmother     Skin cancer Maternal Grandmother     Arthritis Maternal Grandmother     Pneumonia Maternal Grandfather     Cancer Maternal Grandfather     Dementia Maternal Grandfather     COPD Maternal Grandfather     Cancer Paternal Grandmother     Pneumonia Paternal Grandfather     Arthritis Family     Breast cancer Family     Osteopenia Family     Osteoporosis Family     Hypertension Family     Transient ischemic attack Family      I have reviewed and agree with the history as documented.     E-Cigarette/Vaping    E-Cigarette Use Never User      E-Cigarette/Vaping Substances    Nicotine No     THC No     CBD No     Flavoring No     Other No     Unknown No      Social History     Tobacco Use    Smoking status: Never     Passive exposure: Never    Smokeless tobacco: Never   Vaping Use    Vaping Use: Never used Substance Use Topics    Alcohol use: Never    Drug use: Never        Review of Systems   Constitutional:  Negative for chills and fever. HENT:  Negative for ear pain and sore throat. Eyes:  Negative for pain and visual disturbance. Respiratory:  Negative for cough and shortness of breath. Cardiovascular:  Negative for chest pain and palpitations. Gastrointestinal:  Positive for diarrhea, nausea and vomiting. Negative for abdominal pain. Genitourinary:  Negative for dysuria and hematuria. Musculoskeletal:  Negative for arthralgias and back pain. Skin:  Negative for color change and rash. Neurological:  Positive for headaches. Negative for seizures and syncope. All other systems reviewed and are negative. Physical Exam  ED Triage Vitals [11/19/23 1722]   Temperature Pulse Respirations Blood Pressure SpO2   97.8 °F (36.6 °C) 71 18 144/91 100 %      Temp Source Heart Rate Source Patient Position - Orthostatic VS BP Location FiO2 (%)   Temporal Monitor -- -- --      Pain Score       No Pain             Orthostatic Vital Signs  Vitals:    11/19/23 1722   BP: 144/91   Pulse: 71       Physical Exam  Vitals and nursing note reviewed. Constitutional:       General: She is not in acute distress. Appearance: She is well-developed. HENT:      Head: Normocephalic and atraumatic. Eyes:      Conjunctiva/sclera: Conjunctivae normal.   Cardiovascular:      Rate and Rhythm: Normal rate and regular rhythm. Heart sounds: No murmur heard. Pulmonary:      Effort: Pulmonary effort is normal. No respiratory distress. Breath sounds: Normal breath sounds. Abdominal:      Palpations: Abdomen is soft. Tenderness: There is no abdominal tenderness. Musculoskeletal:         General: No swelling. Cervical back: Neck supple. Skin:     General: Skin is warm and dry. Capillary Refill: Capillary refill takes less than 2 seconds. Neurological:      Mental Status: She is alert. Psychiatric:         Mood and Affect: Mood normal.         ED Medications  Medications   ketorolac (TORADOL) injection 15 mg (15 mg Intramuscular Given 11/19/23 1828)   ondansetron (ZOFRAN-ODT) dispersible tablet 4 mg (4 mg Oral Given 11/19/23 1827)       Diagnostic Studies  Results Reviewed       Procedure Component Value Units Date/Time    FLU/RSV/COVID - if FLU/RSV clinically relevant [[id number]]  (Normal) Collected: 11/19/23 1755    Lab Status: Final result Specimen: Nares from Nose Updated: 11/19/23 1845     SARS-CoV-2 Negative     INFLUENZA A PCR Negative     INFLUENZA B PCR Negative     RSV PCR Negative    Narrative:      FOR PEDIATRIC PATIENTS - copy/paste COVID Guidelines URL to browser: [email or web address]    SARS-CoV-2 assay is a Nucleic Acid Amplification assay intended for the  qualitative detection of nucleic acid from SARS-CoV-2 in nasopharyngeal  swabs. Results are for the presumptive identification of SARS-CoV-2 RNA. Positive results are indicative of infection with SARS-CoV-2, the virus  causing COVID-19, but do not rule out bacterial infection or co-infection  with other viruses. Laboratories within the [facility] and its  territories are required to report all positive results to the appropriate  public health authorities. Negative results do not preclude SARS-CoV-2  infection and should not be used as the sole basis for treatment or other  patient management decisions. Negative results must be combined with  clinical observations, patient history, and epidemiological information. This test has not been FDA cleared or approved. This test has been authorized by FDA under an Emergency Use Authorization  (EUA).  This test is only authorized for the duration of time the  declaration that circumstances exist justifying the authorization of the  emergency use of an in vitro diagnostic tests for detection of SARS-CoV-2  virus and/or diagnosis of COVID-19 infection under section 564(b)(1) of  the Act, 21 U. S.C. 590ABX-9(U)(0), unless the authorization is terminated  or revoked sooner. The test has been validated but independent review by FDA  and CLIA is pending. Test performed using [employer] Gene[employer]pert: This RT-PCR assay targets N2,  a region unique to SARS-CoV-2. A conserved region in the E-gene was chosen  for pan-Sarbecovirus detection which includes SARS-CoV-2. According to CMS-2020-01-R, this platform meets the definition of high-throughput technology. No orders to display         Procedures  Procedures      ED Course                                       Medical Decision Making  42 yo F presenting with nausea, vomiting, diarrhea. On initial evaluation, pt was not in acute distress, was with normal vitals, and had no acute findings on physical examination such as abdominal tenderness, pallor, dry mucous membranes. When considering HPI, concern is highest for viral gastroenteritis vs wegovy medication side effect given recent dosage increase and ingestion of larger quantity of food than usual. Imaging was deferred given lack of fever, subjective pain or pain on exam. She was treated symptomatically with toradol for headache and zofran for nausea. Viral swab was collected and negative for flu, COVID. This was discussed with the patient including high suspicion for viral gastroenteritis vs medication side effect. She was prescribed medication for supportive care and counseled to follow with PCP as needed, or return to ED if symptoms acutely worsen and she is not able to tolerate PO intake. Problems Addressed:  Gastroenteritis: acute illness or injury    Risk  Prescription drug management.           Disposition  Final diagnoses:   Gastroenteritis     Time reflects when diagnosis was documented in both MDM as applicable and the Disposition within this note       Time User Action Codes Description Comment    11/19/2023 6:19 PM Marielos Reeves Add [K52.9] Gastroenteritis           ED Disposition       ED Disposition   Discharge    Condition   Stable    Date/Time   Sun Nov 19, 2023  6:19 PM    Comment   Elfredia Naval discharge to home/self care.                    Follow-up Information       Follow up With Specialties Details Why Contact Info    Cammie Lindsey MD Family Medicine Call  for ER follow up visit [phone] [address]  [phone]              Discharge Medication List as of 11/19/2023  6:41 PM        START taking these medications    Details   loperamide (IMODIUM) 2 mg capsule Take 1 capsule (2 mg total) by mouth 4 (four) times a day as needed for diarrhea, Starting Sun 11/19/2023, Normal      ondansetron (ZOFRAN) 4 mg tablet Take 1 tablet (4 mg total) by mouth every 6 (six) hours, Starting Sun 11/19/2023, Normal           CONTINUE these medications which have NOT CHANGED    Details   acetaZOLAMIDE (DIAMOX) 250 mg tablet 1 tab BID X 1 week, then 2 tabs BID, Normal      buPROPion (WELLBUTRIN XL) 300 mg 24 hr tablet Take 300 mg by mouth daily, Starting Fri 3/24/2023, Historical Med      celecoxib (CeleBREX) 200 mg capsule Take 1 capsule (200 mg total) by mouth 2 (two) times a day, Starting Tue 11/14/2023, Normal      Cequa 0.09 % SOLN Apply 1 drop to eye 2 (two) times a day, Starting Sat 8/5/2023, Normal      cholecalciferol (VITAMIN D3) 1,000 units tablet Take 5,000 Units by mouth daily, Historical Med      clindamycin (CLEOCIN) 300 MG capsule Take 300 mg by mouth 2 (two) times a day, Starting Sun 9/24/2023, Historical Med      docusate sodium (COLACE) 100 mg capsule Take 1 capsule (100 mg total) by mouth 2 (two) times a day, Starting Sat 8/5/2023, Normal      fluticasone (FLONASE) 50 mcg/act nasal spray 2 sprays into each nostril 2 (two) times a day, Starting Sat 0/8/3280, Normal      folic acid (FOLVITE) 1 mg tablet Take 1 mg by mouth daily, Historical Med      furosemide (LASIX) 40 mg tablet Take 1 tablet (40 mg total) by mouth if needed (take 40 mg as needed for swelling), Starting Wed 11/8/2023, Normal      gabapentin (Neurontin) 600 MG tablet Take 1 tablet (600 mg total) by mouth 2 (two) times a day, Starting Sat 8/5/2023, Normal      Galcanezumab-gnlm (Emgality) 120 MG/ML SOAJ Inject 120 mg under the skin every 30 (thirty) days, Starting Wed 6/28/2023, Normal      levothyroxine 125 mcg tablet Take 1 tablet (125 mcg total) by mouth daily in the early morning Do not start before August 6, 2023., Starting Sun 8/6/2023, Normal      linaCLOtide (Linzess) 290 MCG CAPS Take 1 capsule by mouth daily, Starting Sat 8/5/2023, Until Wed 11/8/2023, Normal      loratadine (CLARITIN) 10 mg tablet Take 1 tablet (10 mg total) by mouth daily, Starting Sat 8/5/2023, Normal      LORazepam (ATIVAN) 0.5 mg tablet Take 0.5 mg by mouth 2 (two) times a day as needed for anxiety, Historical Med      melatonin 3 mg Take 1 tablet (3 mg total) by mouth daily at bedtime, Starting Sat 8/5/2023, Normal      metFORMIN (GLUCOPHAGE) 500 mg tablet Take 1 tablet (500 mg total) by mouth daily with breakfast, Starting Sat 8/5/2023, Normal      Multiple Vitamin (MULTIVITAMIN) capsule Take 1 capsule by mouth daily, Historical Med      Nurtec 75 MG TBDP TAKE 75MG BY MOUTH EVERY OTHER DAY.   IF THERE IS A MIGRAINE ON A NON-NURTEC DAY ,YOU CAN TAKE A DOSE AND SKIP THE FOLLOWING DAY, Normal      omeprazole (PriLOSEC) 20 mg delayed release capsule Take 1 capsule (20 mg total) by mouth 2 (two) times a day, Starting Sat 8/5/2023, Normal      PARoxetine (PAXIL) 40 MG tablet Take 1 tablet (40 mg total) by mouth daily Do not start before August 8, 2023., Starting Tue 8/8/2023, Until Wed 11/8/2023, Normal      propranolol (INDERAL) 20 mg tablet Take 20 mg by mouth every 12 (twelve) hours, Starting Fri 1/27/2023, Historical Med      rizatriptan (MAXALT) 10 mg tablet Take 1 tablet (10 mg total) by mouth once as needed for migraine May repeat every 2 hours if needed, max 30mg/24 hours, Starting Tue 10/24/2023, Normal      rOPINIRole (REQUIP) 4 mg tablet Take 4 mg by mouth daily at bedtime, Historical Med      Semaglutide-Weight Management (WEGOVY) 1 MG/0.5ML Inject 0.5 mL (1 mg total) under the skin once a week for 4 doses, Starting Thu 11/2/2023, Until Fri 11/24/2023, Normal      spironolactone (ALDACTONE) 50 mg tablet Take 1 tablet (50 mg total) by mouth daily, Starting Sat 8/5/2023, Normal      topiramate (TOPAMAX) 100 mg tablet Take 1.5 tablets (150 mg total) by mouth 2 (two) times a day, Starting Tue 10/3/2023, Normal      !! Vraylar 3 MG capsule Take 3 mg by mouth daily Daily in the evening, Starting Mon 1/16/2023, Historical Med      !! Vraylar 6 MG capsule Take 6 mg by mouth daily Daily in the morning, Starting Mon 6/1/2020, Historical Med      Vyvanse 70 MG capsule Take 70 mg by mouth daily, Starting Wed 8/30/2023, Historical Med       !! - Potential duplicate medications found. Please discuss with provider. No discharge procedures on file. PDMP Review         Value Time User    PDMP Reviewed  Yes 8/7/2023 12:35 PM Deitra Goldmann, [address] [place] [address]             ED Provider  Attending physically available and evaluated Nini Odom. I managed the patient along with the ED Attending.     Electronically Signed by           Sangeetha Duque DO  11/19/23 2433

## 2023-11-22 ENCOUNTER — TELEPHONE (OUTPATIENT)
Dept: GASTROENTEROLOGY | Facility: CLINIC | Age: 38
End: 2023-11-22

## 2023-11-22 NOTE — TELEPHONE ENCOUNTER
I just want to let you all know I spoke with her. She did her 3rd injection of 1mg wegovy last thurday. She was fine on prior 2 injections. She started to get nausea, diarrhea, vomited x 1 and headache. She went to ED. They assessed her and gave her zofran. She hasn't had anymore vomiting now but still has loose stool and decreased appetite. I felt it was more viral but told her to hold wegovy tomorrow and call us back Friday with update.  Just STEPHANY

## 2023-11-22 NOTE — TELEPHONE ENCOUNTER
Patient calling in regards to [facility]. She was [facility] about 1 month ago. She tolerated her first two injections very well, with no symptoms. Patient not sure if she has a stomach virus or if she is starting to develop symptoms from [facility]FORT ANG[facility] and wondering if she should be taking next injection? She is due for next injection tomorrow(Thursday). For the last 3 days she has no appetite, She is having diarrhea, and has vomited. She did go to the ER on Sunday, but they did not do much for her or give her direction. Patient wondering what to do ?  Please call patient back at #[phone]

## 2023-11-27 DIAGNOSIS — I10 HYPERTENSION, UNSPECIFIED TYPE: ICD-10-CM

## 2023-11-27 RX ORDER — SPIRONOLACTONE 50 MG/1
50 TABLET, FILM COATED ORAL DAILY
Qty: 90 TABLET | Refills: 1 | Status: SHIPPED | OUTPATIENT
Start: 2023-11-27

## 2023-11-28 DIAGNOSIS — E66.01 MORBID OBESITY (HCC): ICD-10-CM

## 2023-11-28 RX ORDER — SEMAGLUTIDE 1 MG/.5ML
INJECTION, SOLUTION SUBCUTANEOUS
Qty: 2 ML | Refills: 0 | Status: SHIPPED | OUTPATIENT
Start: 2023-11-28

## 2023-11-30 ENCOUNTER — APPOINTMENT (OUTPATIENT)
Dept: LAB | Facility: CLINIC | Age: 38
End: 2023-11-30
Payer: COMMERCIAL

## 2023-11-30 DIAGNOSIS — G43.709 CHRONIC MIGRAINE WITHOUT AURA WITHOUT STATUS MIGRAINOSUS, NOT INTRACTABLE: ICD-10-CM

## 2023-11-30 LAB
ANION GAP SERPL CALCULATED.3IONS-SCNC: 6 MMOL/L
BUN SERPL-MCNC: 21 MG/DL (ref 5–25)
CALCIUM SERPL-MCNC: 8.8 MG/DL (ref 8.4–10.2)
CHLORIDE SERPL-SCNC: 112 MMOL/L (ref 96–108)
CO2 SERPL-SCNC: 23 MMOL/L (ref 21–32)
CREAT SERPL-MCNC: 0.77 MG/DL (ref 0.6–1.3)
ERYTHROCYTE [DISTWIDTH] IN BLOOD BY AUTOMATED COUNT: 13.7 % (ref 11.6–15.1)
GFR SERPL CREATININE-BSD FRML MDRD: 97 ML/MIN/1.73SQ M
GLUCOSE P FAST SERPL-MCNC: 101 MG/DL (ref 65–99)
HCT VFR BLD AUTO: 43.3 % (ref 34.8–46.1)
HGB BLD-MCNC: 14 G/DL (ref 11.5–15.4)
MCH RBC QN AUTO: 29 PG (ref 26.8–34.3)
MCHC RBC AUTO-ENTMCNC: 32.3 G/DL (ref 31.4–37.4)
MCV RBC AUTO: 90 FL (ref 82–98)
PLATELET # BLD AUTO: 322 THOUSANDS/UL (ref 149–390)
PMV BLD AUTO: 10.4 FL (ref 8.9–12.7)
POTASSIUM SERPL-SCNC: 4 MMOL/L (ref 3.5–5.3)
RBC # BLD AUTO: 4.82 MILLION/UL (ref 3.81–5.12)
SODIUM SERPL-SCNC: 141 MMOL/L (ref 135–147)
WBC # BLD AUTO: 9 THOUSAND/UL (ref 4.31–10.16)

## 2023-11-30 PROCEDURE — 80048 BASIC METABOLIC PNL TOTAL CA: CPT

## 2023-11-30 PROCEDURE — 85027 COMPLETE CBC AUTOMATED: CPT

## 2023-11-30 PROCEDURE — 36415 COLL VENOUS BLD VENIPUNCTURE: CPT

## 2023-12-01 DIAGNOSIS — I10 HYPERTENSION, UNSPECIFIED TYPE: Primary | ICD-10-CM

## 2023-12-11 ENCOUNTER — APPOINTMENT (OUTPATIENT)
Dept: LAB | Facility: CLINIC | Age: 38
End: 2023-12-11
Payer: COMMERCIAL

## 2023-12-11 DIAGNOSIS — I10 HYPERTENSION, UNSPECIFIED TYPE: ICD-10-CM

## 2023-12-11 DIAGNOSIS — K21.9 GASTROESOPHAGEAL REFLUX DISEASE WITHOUT ESOPHAGITIS: ICD-10-CM

## 2023-12-11 PROCEDURE — 80048 BASIC METABOLIC PNL TOTAL CA: CPT

## 2023-12-11 PROCEDURE — 36415 COLL VENOUS BLD VENIPUNCTURE: CPT

## 2023-12-11 NOTE — TELEPHONE ENCOUNTER
Voicemail Left:    Hi this is Compa Come birth date August 18th 1985 Once again Roseline Priest and the last name is Gorge MONTANO birth date 1985 I'm calling. I did put a message in through my chart but it hasn't been. I don't think it was even looked at, but I need omeprazole I take that twice a day. It's 20 milligrams, so 20 milligrams twice a day of Omeprazole called in to [facility] in [place]. I think that's all the information you need. If you need something else, if you can call me, please. It's [phone]. Thank you very much.  Jeet.

## 2023-12-11 NOTE — TELEPHONE ENCOUNTER
Attempted to contact patient and inform her of the med refill and the Dr change . I left a message on her voicemail to call the office.

## 2023-12-12 LAB
ANION GAP SERPL CALCULATED.3IONS-SCNC: 10 MMOL/L
BUN SERPL-MCNC: 21 MG/DL (ref 5–25)
CALCIUM SERPL-MCNC: 9.4 MG/DL (ref 8.4–10.2)
CHLORIDE SERPL-SCNC: 113 MMOL/L (ref 96–108)
CO2 SERPL-SCNC: 19 MMOL/L (ref 21–32)
CREAT SERPL-MCNC: 0.69 MG/DL (ref 0.6–1.3)
GFR SERPL CREATININE-BSD FRML MDRD: 110 ML/MIN/1.73SQ M
GLUCOSE P FAST SERPL-MCNC: 91 MG/DL (ref 65–99)
POTASSIUM SERPL-SCNC: 3.9 MMOL/L (ref 3.5–5.3)
SODIUM SERPL-SCNC: 142 MMOL/L (ref 135–147)

## 2023-12-12 RX ORDER — OMEPRAZOLE 20 MG/1
20 CAPSULE, DELAYED RELEASE ORAL 2 TIMES DAILY
Qty: 180 CAPSULE | Refills: 0 | Status: SHIPPED | OUTPATIENT
Start: 2023-12-12

## 2023-12-12 NOTE — TELEPHONE ENCOUNTER
Called patient to inform her that medication was sent to pharmacy. Left voicemail to call the office with any questions.

## 2023-12-20 ENCOUNTER — TELEPHONE (OUTPATIENT)
Dept: GASTROENTEROLOGY | Facility: CLINIC | Age: 38
End: 2023-12-20

## 2023-12-20 DIAGNOSIS — K59.09 CHRONIC CONSTIPATION: ICD-10-CM

## 2023-12-20 NOTE — TELEPHONE ENCOUNTER
Patient calling in regards to refill on Wegovy. Patient is wondering if she is due for increase in dosage of Wegovy or should she stay at the same. Patient has tolerated the 1 mg well for the last month. Patient would prefer to start 1.7 dose, and would like prescription to be sent to [facility].

## 2023-12-20 NOTE — TELEPHONE ENCOUNTER
Reason for call:   [x] Refill   [] Prior Auth  [] Other:     Office:   [] PCP/Provider -   [x] Specialty/Provider - Gastro     Medication:     linaCLOtide (Linzess)       Dose/Frequency: 290 mcg, Oral, Daily     Quantity: 90    Pharmacy: [id number] - [place] - [address] [place] [address]     Does the patient have enough for 3 days?   [x] Yes   [] No - Send as HP to POD

## 2023-12-21 ENCOUNTER — TELEPHONE (OUTPATIENT)
Dept: BARIATRICS | Facility: CLINIC | Age: 38
End: 2023-12-21

## 2023-12-21 DIAGNOSIS — E66.01 MORBID OBESITY (HCC): ICD-10-CM

## 2023-12-21 DIAGNOSIS — E66.01 MORBID OBESITY (HCC): Primary | ICD-10-CM

## 2023-12-21 NOTE — TELEPHONE ENCOUNTER
AUTH STARTED FOR WEGOVY DOSE INCREASE     Gita Rendon ([facility]: [id number]) - [id number]  Wegovy 1.7MG/0.75ML auto-injectors

## 2023-12-26 DIAGNOSIS — I10 HYPERTENSION, UNSPECIFIED TYPE: Primary | ICD-10-CM

## 2023-12-27 RX ORDER — LINACLOTIDE 290 UG/1
290 CAPSULE, GELATIN COATED ORAL DAILY
Qty: 90 CAPSULE | Refills: 0 | Status: SHIPPED | OUTPATIENT
Start: 2023-12-27 | End: 2024-03-26

## 2023-12-27 NOTE — TELEPHONE ENCOUNTER
Patient called back, she is unable to get 1.7 Wegovy filled anywhere. Patient is asking in the meantime until she can find 1.7 that the 1 mg be called into  [facility]. Patient is going to [place] today from [place]. I advised that Nella will not be back in the office until tomorrow.

## 2023-12-28 ENCOUNTER — TELEPHONE (OUTPATIENT)
Dept: FAMILY MEDICINE CLINIC | Facility: CLINIC | Age: 38
End: 2023-12-28

## 2023-12-28 ENCOUNTER — TELEPHONE (OUTPATIENT)
Dept: GASTROENTEROLOGY | Facility: CLINIC | Age: 38
End: 2023-12-28

## 2023-12-28 ENCOUNTER — RA CDI HCC (OUTPATIENT)
Dept: OTHER | Facility: HOSPITAL | Age: 38
End: 2023-12-28

## 2023-12-28 DIAGNOSIS — E66.01 MORBID OBESITY (HCC): Primary | ICD-10-CM

## 2023-12-28 NOTE — TELEPHONE ENCOUNTER
Patient is calling in regards to Wegovy. She is unable to get 1.7 mg, She would like  1mg sent to [facility] if possible. Patient  is due for injection tomorrow and would at least like to take 1 mg until she can get 1.7 mg

## 2023-12-28 NOTE — TELEPHONE ENCOUNTER
Pt states she needs a referral for [facility] and it has to have the NPI # on it.     Neurology - Headache Specialist    Their NPI# [id number]  [place]     Sent 2nd request to [employer] for record release to [facility].

## 2023-12-29 ENCOUNTER — APPOINTMENT (OUTPATIENT)
Dept: LAB | Facility: CLINIC | Age: 38
End: 2023-12-29
Payer: COMMERCIAL

## 2023-12-29 DIAGNOSIS — I10 HYPERTENSION, UNSPECIFIED TYPE: ICD-10-CM

## 2023-12-29 LAB
ANION GAP SERPL CALCULATED.3IONS-SCNC: 8 MMOL/L
BUN SERPL-MCNC: 25 MG/DL (ref 5–25)
CALCIUM SERPL-MCNC: 9.4 MG/DL (ref 8.4–10.2)
CHLORIDE SERPL-SCNC: 114 MMOL/L (ref 96–108)
CO2 SERPL-SCNC: 19 MMOL/L (ref 21–32)
CREAT SERPL-MCNC: 0.8 MG/DL (ref 0.6–1.3)
GFR SERPL CREATININE-BSD FRML MDRD: 93 ML/MIN/1.73SQ M
GLUCOSE SERPL-MCNC: 99 MG/DL (ref 65–140)
POTASSIUM SERPL-SCNC: 4.3 MMOL/L (ref 3.5–5.3)
SODIUM SERPL-SCNC: 141 MMOL/L (ref 135–147)

## 2023-12-29 PROCEDURE — 36415 COLL VENOUS BLD VENIPUNCTURE: CPT

## 2023-12-29 PROCEDURE — 80048 BASIC METABOLIC PNL TOTAL CA: CPT

## 2023-12-31 ENCOUNTER — HOSPITAL ENCOUNTER (EMERGENCY)
Facility: HOSPITAL | Age: 38
Discharge: HOME | End: 2023-12-31
Attending: EMERGENCY MEDICINE | Admitting: EMERGENCY MEDICINE
Payer: COMMERCIAL

## 2023-12-31 ENCOUNTER — APPOINTMENT (EMERGENCY)
Dept: RADIOLOGY | Facility: HOSPITAL | Age: 38
End: 2023-12-31
Payer: COMMERCIAL

## 2023-12-31 VITALS
HEART RATE: 70 BPM | RESPIRATION RATE: 18 BRPM | TEMPERATURE: 97.5 F | SYSTOLIC BLOOD PRESSURE: 138 MMHG | DIASTOLIC BLOOD PRESSURE: 74 MMHG | OXYGEN SATURATION: 97 %

## 2023-12-31 DIAGNOSIS — J10.1 INFLUENZA A: Primary | ICD-10-CM

## 2023-12-31 LAB
ALBUMIN SERPL BCP-MCNC: 3.9 G/DL (ref 3.5–5)
ALP SERPL-CCNC: 72 U/L (ref 34–104)
ALT SERPL W P-5'-P-CCNC: 15 U/L (ref 7–52)
ANION GAP SERPL CALCULATED.3IONS-SCNC: 10 MMOL/L
AST SERPL W P-5'-P-CCNC: 13 U/L (ref 13–39)
BASOPHILS # BLD AUTO: 0.04 THOUSANDS/ÂΜL (ref 0–0.1)
BASOPHILS NFR BLD AUTO: 1 % (ref 0–1)
BILIRUB SERPL-MCNC: 0.22 MG/DL (ref 0.2–1)
BUN SERPL-MCNC: 16 MG/DL (ref 5–25)
CALCIUM SERPL-MCNC: 9 MG/DL (ref 8.4–10.2)
CARDIAC TROPONIN I PNL SERPL HS: 4 NG/L
CHLORIDE SERPL-SCNC: 113 MMOL/L (ref 96–108)
CO2 SERPL-SCNC: 15 MMOL/L (ref 21–32)
CREAT SERPL-MCNC: 0.89 MG/DL (ref 0.6–1.3)
EOSINOPHIL # BLD AUTO: 0.01 THOUSAND/ÂΜL (ref 0–0.61)
EOSINOPHIL NFR BLD AUTO: 0 % (ref 0–6)
ERYTHROCYTE [DISTWIDTH] IN BLOOD BY AUTOMATED COUNT: 13.3 % (ref 11.6–15.1)
FLUAV RNA RESP QL NAA+PROBE: POSITIVE
FLUBV RNA RESP QL NAA+PROBE: NEGATIVE
GFR SERPL CREATININE-BSD FRML MDRD: 81 ML/MIN/1.73SQ M
GLUCOSE SERPL-MCNC: 108 MG/DL (ref 65–140)
HCT VFR BLD AUTO: 42.9 % (ref 34.8–46.1)
HGB BLD-MCNC: 13.6 G/DL (ref 11.5–15.4)
IMM GRANULOCYTES # BLD AUTO: 0.02 THOUSAND/UL (ref 0–0.2)
IMM GRANULOCYTES NFR BLD AUTO: 0 % (ref 0–2)
LYMPHOCYTES # BLD AUTO: 0.67 THOUSANDS/ÂΜL (ref 0.6–4.47)
LYMPHOCYTES NFR BLD AUTO: 9 % (ref 14–44)
MCH RBC QN AUTO: 28.3 PG (ref 26.8–34.3)
MCHC RBC AUTO-ENTMCNC: 31.7 G/DL (ref 31.4–37.4)
MCV RBC AUTO: 89 FL (ref 82–98)
MONOCYTES # BLD AUTO: 0.94 THOUSAND/ÂΜL (ref 0.17–1.22)
MONOCYTES NFR BLD AUTO: 13 % (ref 4–12)
NEUTROPHILS # BLD AUTO: 5.78 THOUSANDS/ÂΜL (ref 1.85–7.62)
NEUTS SEG NFR BLD AUTO: 77 % (ref 43–75)
NRBC BLD AUTO-RTO: 0 /100 WBCS
PLATELET # BLD AUTO: 237 THOUSANDS/UL (ref 149–390)
PMV BLD AUTO: 10 FL (ref 8.9–12.7)
POTASSIUM SERPL-SCNC: 3.6 MMOL/L (ref 3.5–5.3)
PROT SERPL-MCNC: 6.5 G/DL (ref 6.4–8.4)
RBC # BLD AUTO: 4.8 MILLION/UL (ref 3.81–5.12)
RSV RNA RESP QL NAA+PROBE: NEGATIVE
SARS-COV-2 RNA RESP QL NAA+PROBE: NEGATIVE
SODIUM SERPL-SCNC: 138 MMOL/L (ref 135–147)
WBC # BLD AUTO: 7.46 THOUSAND/UL (ref 4.31–10.16)

## 2023-12-31 PROCEDURE — 85025 COMPLETE CBC W/AUTO DIFF WBC: CPT | Performed by: PHYSICIAN ASSISTANT

## 2023-12-31 PROCEDURE — 71045 X-RAY EXAM CHEST 1 VIEW: CPT

## 2023-12-31 PROCEDURE — 94640 AIRWAY INHALATION TREATMENT: CPT

## 2023-12-31 PROCEDURE — 36415 COLL VENOUS BLD VENIPUNCTURE: CPT | Performed by: PHYSICIAN ASSISTANT

## 2023-12-31 PROCEDURE — 99284 EMERGENCY DEPT VISIT MOD MDM: CPT

## 2023-12-31 PROCEDURE — 99285 EMERGENCY DEPT VISIT HI MDM: CPT | Performed by: PHYSICIAN ASSISTANT

## 2023-12-31 PROCEDURE — 84484 ASSAY OF TROPONIN QUANT: CPT | Performed by: PHYSICIAN ASSISTANT

## 2023-12-31 PROCEDURE — 80053 COMPREHEN METABOLIC PANEL: CPT | Performed by: PHYSICIAN ASSISTANT

## 2023-12-31 PROCEDURE — 0241U HB NFCT DS VIR RESP RNA 4 TRGT: CPT | Performed by: PHYSICIAN ASSISTANT

## 2023-12-31 RX ORDER — IPRATROPIUM BROMIDE AND ALBUTEROL SULFATE 2.5; .5 MG/3ML; MG/3ML
3 SOLUTION RESPIRATORY (INHALATION) ONCE
Status: COMPLETED | OUTPATIENT
Start: 2023-12-31 | End: 2023-12-31

## 2023-12-31 RX ADMIN — IPRATROPIUM BROMIDE AND ALBUTEROL SULFATE 3 ML: 2.5; .5 SOLUTION RESPIRATORY (INHALATION) at 19:01

## 2023-12-31 NOTE — ED PROVIDER NOTES
History  Chief Complaint   Patient presents with    URI     Chest congestion for the past couple of days      This is a 38-year-old female presenting to the emergency department today for evaluation of what she states initially began as a sore throat 2 days ago however now she is experiencing a cough and pain in the chest.  She describes it as a burning sensation in her chest primarily with coughing.  Denies nasal congestion.  No leg pain leg edema, vital signs reviewed no tachycardia tachypnea or hypoxia.  No fever.        Prior to Admission Medications   Prescriptions Last Dose Informant Patient Reported? Taking?   Cequa 0.09 % SOLN  Self No No   Sig: Apply 1 drop to eye 2 (two) times a day   Galcanezumab-gnlm (Emgality) 120 MG/ML SOAJ  Self No No   Sig: Inject 120 mg under the skin every 30 (thirty) days   LORazepam (ATIVAN) 0.5 mg tablet  Self Yes No   Sig: Take 0.5 mg by mouth 2 (two) times a day as needed for anxiety   Multiple Vitamin (MULTIVITAMIN) capsule  Self Yes No   Sig: Take 1 capsule by mouth daily   PARoxetine (PAXIL) 40 MG tablet   No No   Sig: Take 1 tablet (40 mg total) by mouth daily Do not start before 2023.   Semaglutide-Weight Management (WEGOVY) 1 MG/0.5ML   No No   Sig: Inject 0.5 mL (1 mg total) under the skin once a week for 4 doses   Vraylar 3 MG capsule  Self Yes No   Sig: Take 3 mg by mouth daily Daily in the evening   Vraylar 6 MG capsule  Self Yes No   Sig: Take 6 mg by mouth daily Daily in the morning   Vyvanse 70 MG capsule  Self Yes No   Sig: Take 70 mg by mouth daily   acetaZOLAMIDE (DIAMOX) 250 mg tablet   No No   Si tab BID X 1 week, then 2 tabs BID   buPROPion (WELLBUTRIN XL) 300 mg 24 hr tablet  Self Yes No   Sig: Take 300 mg by mouth daily   celecoxib (CeleBREX) 200 mg capsule   No No   Sig: Take 1 capsule (200 mg total) by mouth 2 (two) times a day   cholecalciferol (VITAMIN D3) 1,000 units tablet  Self Yes No   Sig: Take 5,000 Units by mouth daily    clindamycin (CLEOCIN) 300 MG capsule   Yes No   Sig: Take 300 mg by mouth 2 (two) times a day   Patient not taking: Reported on 2023   docusate sodium (COLACE) 100 mg capsule  Self No No   Sig: Take 1 capsule (100 mg total) by mouth 2 (two) times a day   Patient taking differently: Take 100 mg by mouth if needed   fluticasone (FLONASE) 50 mcg/act nasal spray  Self No No   Si sprays into each nostril 2 (two) times a day   folic acid (FOLVITE) 1 mg tablet  Self Yes No   Sig: Take 1 mg by mouth daily   furosemide (LASIX) 40 mg tablet   No No   Sig: Take 1 tablet (40 mg total) by mouth if needed (take 40 mg as needed for swelling)   gabapentin (Neurontin) 600 MG tablet  Self No No   Sig: Take 1 tablet (600 mg total) by mouth 2 (two) times a day   Patient taking differently: Take 600 mg by mouth 2 (two) times a day Half a tab in the morning and 1.5 tabs in the evening   levothyroxine 125 mcg tablet   No No   Sig: Take 1 tablet (125 mcg total) by mouth daily in the early morning   linaCLOtide (Linzess) 290 MCG CAPS   No No   Sig: Take 1 capsule by mouth daily   loperamide (IMODIUM) 2 mg capsule   No No   Sig: Take 1 capsule (2 mg total) by mouth 4 (four) times a day as needed for diarrhea   loratadine (CLARITIN) 10 mg tablet  Self No No   Sig: Take 1 tablet (10 mg total) by mouth daily   melatonin 3 mg  Self No No   Sig: Take 1 tablet (3 mg total) by mouth daily at bedtime   Patient not taking: Reported on 2023   metFORMIN (GLUCOPHAGE) 500 mg tablet  Self No No   Sig: Take 1 tablet (500 mg total) by mouth daily with breakfast   omeprazole (PriLOSEC) 20 mg delayed release capsule   No No   Sig: Take 1 capsule (20 mg total) by mouth 2 (two) times a day   ondansetron (ZOFRAN) 4 mg tablet   No No   Sig: Take 1 tablet (4 mg total) by mouth every 6 (six) hours   propranolol (INDERAL) 20 mg tablet  Self Yes No   Sig: Take 20 mg by mouth every 12 (twelve) hours   rOPINIRole (REQUIP) 4 mg tablet  Self Yes No   Sig:  Take 4 mg by mouth daily at bedtime   rimegepant sulfate (Nurtec) 75 mg TBDP   No No   Sig: TAKE 75MG BY MOUTH EVERY OTHER DAY. IF THERE IS A MIGRAINE ON A NON-NURTEC DAY ,YOU CAN TAKE A DOSE AND SKIP THE FOLLOWING DAY   rizatriptan (MAXALT) 10 mg tablet   No No   Sig: Take 1 tablet (10 mg total) by mouth once as needed for migraine May repeat every 2 hours if needed, max 30mg/24 hours   spironolactone (ALDACTONE) 50 mg tablet   No No   Sig: Take 1 tablet (50 mg total) by mouth daily   topiramate (TOPAMAX) 100 mg tablet   No No   Sig: Take 1.5 tablets (150 mg total) by mouth 2 (two) times a day      Facility-Administered Medications: None       Past Medical History:   Diagnosis Date    Allergic     Allergic rhinitis     Anorexia nervosa in remission     Anxiety     Arthritis 5/8/2014    Back pain     Bipolar disorder (HCC)     Depression     Dermatitis 10/16/2021    Disease of thyroid gland     Diverticulitis of colon 9/20/2015    Dizziness     Ear problems     Eating disorder     Esophageal reflux 08/15/2013    GERD (gastroesophageal reflux disease) 8/15/2013    Headache(784.0)     Headache, tension-type     Hypertension     Hypothyroid     Idiopathic intracranial hypertension     Lumbar degenerative disc disease 05/08/2014    Migraine     Nasal congestion     Nosebleed     Obesity     Obsessive-compulsive disorder     Otitis media     Overactive bladder     Psychiatric disorder     Restless leg syndrome, controlled     Seasonal allergies     Sinusitis     Sleep apnea     Sleep difficulties     Suicide and self-inflicted injury (HCC)     Tinnitus     TMJ dysfunction     Tonsillitis     Transcranial Magnetic Stimulation 09/06/2021    Urinary tract infection     Vision loss        Past Surgical History:   Procedure Laterality Date    DENTAL SURGERY      wisdom teeth-at [age] years. Other surgery dental extraxtion of bone spur (10 years ago)    IR LUMBAR PUNCTURE  02/26/2019    SINUS ENDOSCOPY      SINUS SURGERY       TONSILLECTOMY         Family History   Problem Relation Age of Onset    Mental illness Mother     Depression Mother     Suicide Attempts Mother     Hypertension Mother     Diabetes Mother     Other Mother         bicuspid aortic valve    Anxiety disorder Mother     Psychiatric Illness Mother     Schizoaffective Disorder  Mother     Anemia Mother     Hypertension Father     Hypothyroidism Father     Thyroid disease Father     Hypertension Brother     Cancer Maternal Grandmother     Heart disease Maternal Grandmother     Stroke Maternal Grandmother     Breast cancer Maternal Grandmother     Skin cancer Maternal Grandmother     Arthritis Maternal Grandmother     Pneumonia Maternal Grandfather     Cancer Maternal Grandfather     Dementia Maternal Grandfather     COPD Maternal Grandfather     Cancer Paternal Grandmother     Pneumonia Paternal Grandfather     Arthritis Family     Breast cancer Family     Osteopenia Family     Osteoporosis Family     Hypertension Family     Transient ischemic attack Family      I have reviewed and agree with the history as documented.    E-Cigarette/Vaping    E-Cigarette Use Never User      E-Cigarette/Vaping Substances    Nicotine No     THC No     CBD No     Flavoring No     Other No     Unknown No      Social History     Tobacco Use    Smoking status: Never     Passive exposure: Never    Smokeless tobacco: Never   Vaping Use    Vaping status: Never Used   Substance Use Topics    Alcohol use: Never    Drug use: Never       Review of Systems   Constitutional:  Negative for chills and fever.   HENT:  Positive for sore throat. Negative for ear pain.    Eyes:  Negative for pain and visual disturbance.   Respiratory:  Positive for cough and shortness of breath.    Cardiovascular:  Positive for chest pain. Negative for palpitations.   Gastrointestinal:  Negative for abdominal pain and vomiting.   Genitourinary:  Negative for dysuria and hematuria.   Musculoskeletal:  Negative for arthralgias  and back pain.   Skin:  Negative for color change and rash.   Neurological:  Negative for seizures and syncope.   All other systems reviewed and are negative.      Physical Exam  Physical Exam  Vitals reviewed.   Constitutional:       General: She is not in acute distress.     Appearance: She is well-developed. She is obese. She is not ill-appearing, toxic-appearing or diaphoretic.   HENT:      Right Ear: External ear normal. No swelling. Tympanic membrane is not bulging.      Left Ear: External ear normal. No swelling. Tympanic membrane is not bulging.      Nose: Congestion present.      Mouth/Throat:      Pharynx: No oropharyngeal exudate.   Eyes:      General: Lids are normal.      Conjunctiva/sclera: Conjunctivae normal.      Pupils: Pupils are equal, round, and reactive to light.   Neck:      Thyroid: No thyromegaly.      Vascular: No JVD.      Trachea: No tracheal deviation.   Cardiovascular:      Rate and Rhythm: Normal rate and regular rhythm.      Pulses: Normal pulses.      Heart sounds: Normal heart sounds. No murmur heard.     No friction rub. No gallop.   Pulmonary:      Effort: Pulmonary effort is normal. No respiratory distress.      Breath sounds: Normal breath sounds. No stridor. No wheezing or rales.   Chest:      Chest wall: No tenderness.   Abdominal:      General: Bowel sounds are normal. There is no distension.      Palpations: Abdomen is soft. There is no mass.      Tenderness: There is no abdominal tenderness. There is no guarding or rebound. Negative signs include Pino's sign.      Hernia: No hernia is present.   Musculoskeletal:         General: No tenderness. Normal range of motion.      Cervical back: Normal range of motion and neck supple. No edema. Normal range of motion.   Lymphadenopathy:      Cervical: No cervical adenopathy.   Skin:     General: Skin is warm and dry.      Capillary Refill: Capillary refill takes less than 2 seconds.      Coloration: Skin is not pale.       Findings: No erythema or rash.   Neurological:      Mental Status: She is alert and oriented to person, place, and time.      GCS: GCS eye subscore is 4. GCS verbal subscore is 5. GCS motor subscore is 6.      Cranial Nerves: No cranial nerve deficit.      Sensory: No sensory deficit.      Deep Tendon Reflexes: Reflexes are normal and symmetric.   Psychiatric:         Speech: Speech normal.         Behavior: Behavior normal.         Vital Signs  ED Triage Vitals [12/31/23 1804]   Temperature Pulse Respirations Blood Pressure SpO2   97.5 °F (36.4 °C) 70 18 138/74 97 %      Temp Source Heart Rate Source Patient Position - Orthostatic VS BP Location FiO2 (%)   Temporal Monitor Sitting Right arm --      Pain Score       --           Vitals:    12/31/23 1804   BP: 138/74   Pulse: 70   Patient Position - Orthostatic VS: Sitting         Visual Acuity      ED Medications  Medications   ipratropium-albuterol (DUO-NEB) 0.5-2.5 mg/3 mL inhalation solution 3 mL (3 mL Nebulization Given 12/31/23 1901)       Diagnostic Studies  Results Reviewed       Procedure Component Value Units Date/Time    FLU/RSV/COVID - if FLU/RSV clinically relevant [[id number]]  (Abnormal) Collected: 12/31/23 1838    Lab Status: Final result Specimen: Nares from Nose Updated: 12/31/23 1923     SARS-CoV-2 Negative     INFLUENZA A PCR Positive     INFLUENZA B PCR Negative     RSV PCR Negative    Narrative:      FOR PEDIATRIC PATIENTS - copy/paste COVID Guidelines URL to browser: [email or web address]    SARS-CoV-2 assay is a Nucleic Acid Amplification assay intended for the  qualitative detection of nucleic acid from SARS-CoV-2 in nasopharyngeal  swabs. Results are for the presumptive identification of SARS-CoV-2 RNA.    Positive results are indicative of infection with SARS-CoV-2, the virus  causing COVID-19, but do not rule out bacterial infection or co-infection  with other viruses. Laboratories within  the [place] and its  territories are required to report all positive results to the appropriate  public health authorities. Negative results do not preclude SARS-CoV-2  infection and should not be used as the sole basis for treatment or other  patient management decisions. Negative results must be combined with  clinical observations, patient history, and epidemiological information.  This test has not been FDA cleared or approved.    This test has been authorized by FDA under an Emergency Use Authorization  (EUA). This test is only authorized for the duration of time the  declaration that circumstances exist justifying the authorization of the  emergency use of an in vitro diagnostic tests for detection of SARS-CoV-2  virus and/or diagnosis of COVID-19 infection under section 564(b)(1) of  the Act, 21 U.S.C. 360bbb-3(b)(1), unless the authorization is terminated  or revoked sooner. The test has been validated but independent review by FDA  and CLIA is pending.    Test performed using [employer] Gene[employer]pert: This RT-PCR assay targets N2,  a region unique to SARS-CoV-2. A conserved region in the E-gene was chosen  for pan-Sarbecovirus detection which includes SARS-CoV-2.    According to CMS-2020-01-R, this platform meets the definition of high-throughput technology.    HS Troponin 0hr (reflex protocol) [[id number]]  (Normal) Collected: 12/31/23 1840    Lab Status: Final result Specimen: Blood from Arm, Right Updated: 12/31/23 1910     hs TnI 0hr 4 ng/L     Comprehensive metabolic panel [[id number]]  (Abnormal) Collected: 12/31/23 1840    Lab Status: Final result Specimen: Blood from Arm, Right Updated: 12/31/23 1903     Sodium 138 mmol/L      Potassium 3.6 mmol/L      Chloride 113 mmol/L      CO2 15 mmol/L      ANION GAP 10 mmol/L      BUN 16 mg/dL      Creatinine 0.89 mg/dL      Glucose 108 mg/dL      Calcium 9.0 mg/dL      AST 13 U/L      ALT 15 U/L      Alkaline Phosphatase 72 U/L      Total Protein 6.5 g/dL       Albumin 3.9 g/dL      Total Bilirubin 0.22 mg/dL      eGFR 81 ml/min/1.73sq m     Narrative:      National Kidney Disease Foundation guidelines for Chronic Kidney Disease (CKD):     Stage 1 with normal or high GFR (GFR > 90 mL/min/1.73 square meters)    Stage 2 Mild CKD (GFR = 60-89 mL/min/1.73 square meters)    Stage 3A Moderate CKD (GFR = 45-59 mL/min/1.73 square meters)    Stage 3B Moderate CKD (GFR = 30-44 mL/min/1.73 square meters)    Stage 4 Severe CKD (GFR = 15-29 mL/min/1.73 square meters)    Stage 5 End Stage CKD (GFR <15 mL/min/1.73 square meters)  Note: GFR calculation is accurate only with a steady state creatinine    CBC and differential [[id number]]  (Abnormal) Collected: 12/31/23 1840    Lab Status: Final result Specimen: Blood from Arm, Right Updated: 12/31/23 1846     WBC 7.46 Thousand/uL      RBC 4.80 Million/uL      Hemoglobin 13.6 g/dL      Hematocrit 42.9 %      MCV 89 fL      MCH 28.3 pg      MCHC 31.7 g/dL      RDW 13.3 %      MPV 10.0 fL      Platelets 237 Thousands/uL      nRBC 0 /100 WBCs      Neutrophils Relative 77 %      Immat GRANS % 0 %      Lymphocytes Relative 9 %      Monocytes Relative 13 %      Eosinophils Relative 0 %      Basophils Relative 1 %      Neutrophils Absolute 5.78 Thousands/µL      Immature Grans Absolute 0.02 Thousand/uL      Lymphocytes Absolute 0.67 Thousands/µL      Monocytes Absolute 0.94 Thousand/µL      Eosinophils Absolute 0.01 Thousand/µL      Basophils Absolute 0.04 Thousands/µL                    XR chest 1 view portable   ED Interpretation by Stewart Wu PA-C (12/31 1906)   No evidence of acute cardiopulmonary process.                 Procedures  ECG 12 Lead Documentation Only    Date/Time: 12/31/2023 7:13 PM    Performed by: Stewart Wu PA-C  Authorized by: Stewart Wu PA-C    Indications / Diagnosis:  Chest pain  ECG reviewed by me, the ED Provider: yes    Patient location:  ED  Previous ECG:     Comparison to cardiac  monitor: Yes    Interpretation:     Interpretation: normal    Rate:     ECG rate:  59    ECG rate assessment: bradycardic    Rhythm:     Rhythm: sinus bradycardia    Ectopy:     Ectopy: none    QRS:     QRS axis:  Normal    QRS intervals:  Normal  Conduction:     Conduction: normal    ST segments:     ST segments:  Normal  T waves:     T waves: normal             ED Course  ED Course as of 12/31/23 1928   Sun Dec 31, 2023   1815 SpO2: 97 %   1815 Respirations: 18   1815 Pulse: 70   1815 Temperature: 97.5 °F (36.4 °C)   1815 Blood Pressure: 138/74   1900 WBC: 7.46   1900 Hemoglobin: 13.6   1900 Platelet Count: 237  CBC was reviewed there is no leukocytosis anemia or thrombocytopenia   1924 INFLU A PCR(!): Positive             HEART Risk Score      Flowsheet Row Most Recent Value   Heart Score Risk Calculator    History 0 Filed at: 12/31/2023 1914   ECG 0 Filed at: 12/31/2023 1914   Age 0 Filed at: 12/31/2023 1914   Risk Factors 1 Filed at: 12/31/2023 1914   Troponin 0 Filed at: 12/31/2023 1914   HEART Score 1 Filed at: 12/31/2023 1914                          SBIRT 20yo+      Flowsheet Row Most Recent Value   Initial Alcohol Screen: US AUDIT-C     1. How often do you have a drink containing alcohol? 0 Filed at: 12/31/2023 1805   2. How many drinks containing alcohol do you have on a typical day you are drinking?  0 Filed at: 12/31/2023 1805   3b. FEMALE Any Age, or MALE 65+: How often do you have 4 or more drinks on one occassion? 0 Filed at: 12/31/2023 1805   Audit-C Score 0 Filed at: 12/31/2023 1805   JENNIFER: How many times in the past year have you...    Used an illegal drug or used a prescription medication for non-medical reasons? Never Filed at: 12/31/2023 1805                      Medical Decision Making  38-year-old female presenting with sore throat cough dry nonproductive but she is also experiencing burning in her chest.  Chest burning/pain is worse with coughing.  Vital signs reviewed within normal limits  differential diagnosis includes pneumonia, COVID, influenza, RSV, acute coronary syndrome.    Trop and EKG normal, influenza A positive, stable for d/c home    Amount and/or Complexity of Data Reviewed  Labs: ordered. Decision-making details documented in ED Course.  Radiology: ordered and independent interpretation performed.    Risk  Prescription drug management.             Disposition  Final diagnoses:   Influenza A     Time reflects when diagnosis was documented in both MDM as applicable and the Disposition within this note       Time User Action Codes Description Comment    12/31/2023  7:25 PM Stewart Wu Add [J10.1] Influenza A           ED Disposition       ED Disposition   Discharge    Condition   Stable    Date/Time   Sun Dec 31, 2023 1915    Comment   Gita Rendon discharge to home/self care.                   Follow-up Information       Follow up With Specialties Details Why Contact Info    Norma Traore MD Family Medicine Schedule an appointment as soon as possible for a visit  As needed [address] [place] [phone]  [phone]              Patient's Medications   Discharge Prescriptions    No medications on file       No discharge procedures on file.    PDMP Review         Value Time User    PDMP Reviewed  Yes 8/7/2023 12:35 PM LEIGHA Kaiser            ED Provider  Electronically Signed by             Stewart uW PA-C  12/31/23 [id number]

## 2024-01-03 DIAGNOSIS — E87.20 ACIDOSIS: Primary | ICD-10-CM

## 2024-01-04 ENCOUNTER — TELEPHONE (OUTPATIENT)
Dept: FAMILY MEDICINE CLINIC | Facility: CLINIC | Age: 39
End: 2024-01-04

## 2024-01-04 NOTE — TELEPHONE ENCOUNTER
Patient called in stating she tested positive for the flu on 12/31/23. Patient was instruction to take tylneol to help with fevers and nothing else. She is having a hard time breathing when going outside to let her dog out. She was asking for an inhaler or something to help. She is very congested.     Patient also asked if Ayesha requested disc of CT or MRI imaging to be sent to [facility].

## 2024-01-05 ENCOUNTER — APPOINTMENT (OUTPATIENT)
Dept: LAB | Facility: CLINIC | Age: 39
End: 2024-01-05
Payer: COMMERCIAL

## 2024-01-05 DIAGNOSIS — E87.20 ACIDOSIS: ICD-10-CM

## 2024-01-05 LAB
ANION GAP SERPL CALCULATED.3IONS-SCNC: 8 MMOL/L
BUN SERPL-MCNC: 18 MG/DL (ref 5–25)
CALCIUM SERPL-MCNC: 9.4 MG/DL (ref 8.4–10.2)
CHLORIDE SERPL-SCNC: 111 MMOL/L (ref 96–108)
CO2 SERPL-SCNC: 21 MMOL/L (ref 21–32)
CREAT SERPL-MCNC: 0.86 MG/DL (ref 0.6–1.3)
GFR SERPL CREATININE-BSD FRML MDRD: 85 ML/MIN/1.73SQ M
GLUCOSE P FAST SERPL-MCNC: 105 MG/DL (ref 65–99)
POTASSIUM SERPL-SCNC: 4.2 MMOL/L (ref 3.5–5.3)
SODIUM SERPL-SCNC: 140 MMOL/L (ref 135–147)

## 2024-01-05 PROCEDURE — 36415 COLL VENOUS BLD VENIPUNCTURE: CPT

## 2024-01-05 PROCEDURE — 80048 BASIC METABOLIC PNL TOTAL CA: CPT

## 2024-01-05 NOTE — TELEPHONE ENCOUNTER
Called patient to relay message from Ayesha and Dr. Schuster. I informed her our same day appointment was taken for this afternoon. She would have to call on Monday to schedule that or us the care now if she didn't want to wait over the weekend.

## 2024-01-06 ENCOUNTER — RA CDI HCC (OUTPATIENT)
Dept: OTHER | Facility: HOSPITAL | Age: 39
End: 2024-01-06

## 2024-01-06 DIAGNOSIS — G43.709 CHRONIC MIGRAINE WITHOUT AURA WITHOUT STATUS MIGRAINOSUS, NOT INTRACTABLE: ICD-10-CM

## 2024-01-06 RX ORDER — CELECOXIB 200 MG/1
200 CAPSULE ORAL 2 TIMES DAILY
Qty: 60 CAPSULE | Refills: 1 | Status: SHIPPED | OUTPATIENT
Start: 2024-01-06

## 2024-01-07 NOTE — PROGRESS NOTES
HCC coding opportunities    F31.9     Chart Reviewed number of suggestions sent to Provider: 1     GR  suggestions already on BPA    Patients Insurance     Medicare Insurance: [employer] Advantage

## 2024-01-08 DIAGNOSIS — I10 HYPERTENSION, UNSPECIFIED TYPE: Primary | ICD-10-CM

## 2024-01-09 ENCOUNTER — APPOINTMENT (OUTPATIENT)
Dept: RADIOLOGY | Facility: CLINIC | Age: 39
End: 2024-01-09
Payer: COMMERCIAL

## 2024-01-09 ENCOUNTER — OFFICE VISIT (OUTPATIENT)
Dept: URGENT CARE | Facility: CLINIC | Age: 39
End: 2024-01-09
Payer: COMMERCIAL

## 2024-01-09 VITALS
DIASTOLIC BLOOD PRESSURE: 77 MMHG | SYSTOLIC BLOOD PRESSURE: 114 MMHG | OXYGEN SATURATION: 100 % | RESPIRATION RATE: 18 BRPM | TEMPERATURE: 98 F | BODY MASS INDEX: 45.99 KG/M2 | WEIGHT: 293 LBS | HEART RATE: 65 BPM | HEIGHT: 67 IN

## 2024-01-09 DIAGNOSIS — J01.11 ACUTE RECURRENT FRONTAL SINUSITIS: Primary | ICD-10-CM

## 2024-01-09 DIAGNOSIS — R05.1 ACUTE COUGH: ICD-10-CM

## 2024-01-09 PROCEDURE — 99213 OFFICE O/P EST LOW 20 MIN: CPT | Performed by: PHYSICIAN ASSISTANT

## 2024-01-09 PROCEDURE — S9088 SERVICES PROVIDED IN URGENT: HCPCS | Performed by: PHYSICIAN ASSISTANT

## 2024-01-09 PROCEDURE — 71046 X-RAY EXAM CHEST 2 VIEWS: CPT

## 2024-01-09 RX ORDER — DEXTROMETHORPHAN HYDROBROMIDE, BUPROPION HYDROCHLORIDE 105; 45 MG/1; MG/1
1 TABLET, MULTILAYER, EXTENDED RELEASE ORAL 2 TIMES DAILY
COMMUNITY
Start: 2023-12-21

## 2024-01-09 RX ORDER — AMOXICILLIN AND CLAVULANATE POTASSIUM 875; 125 MG/1; MG/1
1 TABLET, FILM COATED ORAL EVERY 12 HOURS SCHEDULED
Qty: 14 TABLET | Refills: 0 | Status: SHIPPED | OUTPATIENT
Start: 2024-01-09 | End: 2024-01-16

## 2024-01-09 NOTE — PROGRESS NOTES
[facility]        NAME: Gita Rendon is a 38 y.o. female  : 1985    MRN: [id number]  DATE: 2024  TIME: 3:23 PM    Assessment and Plan   Acute recurrent frontal sinusitis [J01.11]  1. Acute recurrent frontal sinusitis  amoxicillin-clavulanate (AUGMENTIN) 875-125 mg per tablet      2. Acute cough  XR chest pa & lateral            Patient Instructions     Patient Instructions   Upper Respiratory Infection   WHAT YOU NEED TO KNOW:   An upper respiratory infection is also called a cold. It can affect your nose, throat, ears, and sinuses. Cold symptoms are usually worst for the first 3 to 5 days. Most people get better in 7 to 14 days. You may continue to cough for 2 to 3 weeks. Colds are caused by viruses and do not get better with antibiotics.  DISCHARGE INSTRUCTIONS:   Call your local emergency number (911 in the ) if:   You have chest pain or trouble breathing.      Return to the emergency department if:   You have a fever over 102ºF (39ºC).      Call your doctor if:   You have a low fever.    Your sore throat gets worse or you see white or yellow spots in your throat.    Your symptoms get worse after 3 to 5 days or are not better in 14 days.    You have a rash anywhere on your skin.    You have large, tender lumps in your neck.    You have thick, green, or yellow drainage from your nose.    You cough up thick yellow, green, or bloody mucus.    You have a bad earache.    You have questions or concerns about your condition or care.    Medicines:  You may need any of the following:  Decongestants  help reduce nasal congestion and help you breathe more easily. If you take decongestant pills, they may make you feel restless or cause problems with your sleep. Do not use decongestant sprays for more than a few days.    Cough suppressants  help reduce coughing. Ask your healthcare provider which type of cough medicine is best for you.     NSAIDs , such as ibuprofen, help decrease swelling,  pain, and fever. NSAIDs can cause stomach bleeding or kidney problems in certain people. If you take blood thinner medicine, always ask your healthcare provider if NSAIDs are safe for you. Always read the medicine label and follow directions.    Acetaminophen  decreases pain and fever. It is available without a doctor's order. Ask how much to take and how often to take it. Follow directions. Read the labels of all other medicines you are using to see if they also contain acetaminophen, or ask your doctor or pharmacist. Acetaminophen can cause liver damage if not taken correctly.    Take your medicine as directed.  Contact your healthcare provider if you think your medicine is not helping or if you have side effects. Tell your provider if you are allergic to any medicine. Keep a list of the medicines, vitamins, and herbs you take. Include the amounts, and when and why you take them. Bring the list or the pill bottles to follow-up visits. Carry your medicine list with you in case of an emergency.    Self-care:   Rest as much as possible.  Slowly start to do more each day.    Drink more liquids as directed.  Liquids will help thin and loosen mucus so you can cough it up. Liquids will also help prevent dehydration. Liquids that help prevent dehydration include water, fruit juice, and broth. Do not drink liquids that contain caffeine. Caffeine can increase your risk for dehydration. Ask your healthcare provider how much liquid to drink each day.    Soothe a sore throat.  Gargle with warm salt water. Make salt water by dissolving ¼ teaspoon salt in 1 cup warm water. You may also suck on hard candy or throat lozenges. You may use a sore throat spray.    Use a humidifier or vaporizer.  Use a cool mist humidifier or a vaporizer to increase air moisture in your home. This may make it easier for you to breathe and help decrease your cough.    Use saline nasal drops as directed.  These help relieve congestion.    Apply  petroleum-based jelly around the outside of your nostrils.  This can decrease irritation from blowing your nose.    Do not smoke.  Nicotine and other chemicals in cigarettes and cigars can make your symptoms worse. They can also cause infections such as bronchitis or pneumonia. Ask your healthcare provider for information if you currently smoke and need help to quit. E-cigarettes or smokeless tobacco still contain nicotine. Talk to your healthcare provider before you use these products.    Prevent a cold:   Wash your hands often.  Use soap and water every time you wash your hands. Rub your soapy hands together, lacing your fingers. Use the fingers of one hand to scrub under the nails of the other hand. Wash for at least 20 seconds. Rinse with warm, running water for several seconds. Then dry your hands. Use hand  gel if soap and water are not available. Do not touch your eyes or mouth without washing your hands first.         Cover a sneeze or cough.  Use a tissue that covers your mouth and nose. Put the used tissue in the trash right away. Use the bend of your arm if a tissue is not available. Wash your hands well with soap and water or use a hand . Do not stand close to anyone who is sneezing or coughing.    Try to stay away from others while you are sick.  This is especially important during the first 2 to 3 days when the virus is more easily spread. Wait until a fever, cough, or other symptoms are gone before you return to work or other regular activities.    Do not share items while you are sick.  This includes food, drinks, eating utensils, and dishes.    Follow up with your doctor as directed:  Write down your questions so you remember to ask them during your visits.  © Copyright [employer]ative 2023 Information is for End User's use only and may not be sold, redistributed or otherwise used for commercial purposes.  The above information is an  only. It is not intended as medical  advice for individual conditions or treatments. Talk to your doctor, nurse or pharmacist before following any medical regimen to see if it is safe and effective for you.        Follow up with PCP in 3-5 days.  Proceed to  ER if symptoms worsen.    Chief Complaint     Chief Complaint   Patient presents with    Shortness of Breath    Nasal Congestion    Wheezing     Had the flu 10 days ago and still not feeling better          History of Present Illness       The patient was diagnosed with the flu 10 days ago.  She states she still has nasal congestion and wheezing.    Shortness of Breath  The current episode started in the past 7 days. The problem occurs every few minutes. The problem has been waxing and waning since onset. Associated symptoms include coughing, orthopnea, rhinorrhea and wheezing. Pertinent negatives include no fatigue. The symptoms are aggravated by any activity, weather changes and lying flat.   Wheezing  Associated symptoms include coughing, orthopnea, rhinorrhea and wheezing. Pertinent negatives include no fatigue.       Review of Systems   Review of Systems   Constitutional:  Negative for chills, fatigue and fever.   HENT:  Positive for ear pain and rhinorrhea.    Respiratory:  Positive for cough, shortness of breath and wheezing.    Cardiovascular:  Positive for orthopnea.   Neurological:  Positive for headaches. Negative for numbness.         Current Medications       Current Outpatient Medications:     acetaZOLAMIDE (DIAMOX) 250 mg tablet, 1 tab BID X 1 week, then 2 tabs BID, Disp: 120 tablet, Rfl: 2    amoxicillin-clavulanate (AUGMENTIN) 875-125 mg per tablet, Take 1 tablet by mouth every 12 (twelve) hours for 7 days, Disp: 14 tablet, Rfl: 0    Auvelity  MG TBCR, Take 1 tablet by mouth 2 (two) times a day, Disp: , Rfl:     celecoxib (CeleBREX) 200 mg capsule, take 1 capsule by mouth twice a day, Disp: 60 capsule, Rfl: 1    Cequa 0.09 % SOLN, Apply 1 drop to eye 2 (two) times a day,  Disp: 60 each, Rfl: 0    cholecalciferol (VITAMIN D3) 1,000 units tablet, Take 5,000 Units by mouth daily, Disp: , Rfl:     fluticasone (FLONASE) 50 mcg/act nasal spray, 2 sprays into each nostril 2 (two) times a day, Disp: 16 g, Rfl: 0    folic acid (FOLVITE) 1 mg tablet, Take 1 mg by mouth daily, Disp: , Rfl:     furosemide (LASIX) 40 mg tablet, Take 1 tablet (40 mg total) by mouth if needed (take 40 mg as needed for swelling), Disp: 135 tablet, Rfl: 1    gabapentin (Neurontin) 600 MG tablet, Take 1 tablet (600 mg total) by mouth 2 (two) times a day (Patient taking differently: Take 600 mg by mouth 2 (two) times a day Half a tab in the morning and 1.5 tabs in the evening), Disp: 60 tablet, Rfl: 0    Galcanezumab-gnlm (Emgality) 120 MG/ML SOAJ, Inject 120 mg under the skin every 30 (thirty) days, Disp: 3 mL, Rfl: 3    levothyroxine 125 mcg tablet, Take 1 tablet (125 mcg total) by mouth daily in the early morning, Disp: 90 tablet, Rfl: 1    linaCLOtide (Linzess) 290 MCG CAPS, Take 1 capsule by mouth daily, Disp: 90 capsule, Rfl: 0    loratadine (CLARITIN) 10 mg tablet, Take 1 tablet (10 mg total) by mouth daily, Disp: 30 tablet, Rfl: 0    LORazepam (ATIVAN) 0.5 mg tablet, Take 0.5 mg by mouth 2 (two) times a day as needed for anxiety, Disp: , Rfl:     metFORMIN (GLUCOPHAGE) 500 mg tablet, Take 1 tablet (500 mg total) by mouth daily with breakfast, Disp: 90 tablet, Rfl: 0    Multiple Vitamin (MULTIVITAMIN) capsule, Take 1 capsule by mouth daily, Disp: , Rfl:     omeprazole (PriLOSEC) 20 mg delayed release capsule, Take 1 capsule (20 mg total) by mouth 2 (two) times a day, Disp: 180 capsule, Rfl: 0    propranolol (INDERAL) 20 mg tablet, Take 20 mg by mouth every 12 (twelve) hours, Disp: , Rfl:     rimegepant sulfate (Nurtec) 75 mg TBDP, TAKE 75MG BY MOUTH EVERY OTHER DAY. IF THERE IS A MIGRAINE ON A NON-NURTEC DAY ,YOU CAN TAKE A DOSE AND SKIP THE FOLLOWING DAY, Disp: 16 tablet, Rfl: 3    rizatriptan (MAXALT) 10 mg  tablet, Take 1 tablet (10 mg total) by mouth once as needed for migraine May repeat every 2 hours if needed, max 30mg/24 hours, Disp: 12 tablet, Rfl: 3    rOPINIRole (REQUIP) 4 mg tablet, Take 4 mg by mouth daily at bedtime, Disp: , Rfl:     Semaglutide-Weight Management (WEGOVY) 1 MG/0.5ML, Inject 0.5 mL (1 mg total) under the skin once a week for 4 doses, Disp: 2 mL, Rfl: 0    spironolactone (ALDACTONE) 50 mg tablet, Take 1 tablet (50 mg total) by mouth daily, Disp: 90 tablet, Rfl: 1    topiramate (TOPAMAX) 100 mg tablet, Take 1.5 tablets (150 mg total) by mouth 2 (two) times a day, Disp: 270 tablet, Rfl: 1    Vraylar 6 MG capsule, Take 6 mg by mouth daily Daily in the morning, Disp: , Rfl:     Vyvanse 70 MG capsule, Take 70 mg by mouth daily, Disp: , Rfl:     melatonin 3 mg, Take 1 tablet (3 mg total) by mouth daily at bedtime (Patient not taking: Reported on 1/9/2024), Disp: 30 tablet, Rfl: 0    PARoxetine (PAXIL) 40 MG tablet, Take 1 tablet (40 mg total) by mouth daily Do not start before August 8, 2023., Disp: 30 tablet, Rfl: 0    Vraylar 3 MG capsule, Take 3 mg by mouth daily Daily in the evening (Patient not taking: Reported on 1/9/2024), Disp: , Rfl:     Current Allergies     Allergies as of 01/09/2024 - Reviewed 01/09/2024   Allergen Reaction Noted    Doxycycline  03/28/2019    Other  02/26/2018            The following portions of the patient's history were reviewed and updated as appropriate: allergies, current medications, past family history, past medical history, past social history, past surgical history and problem list.     Past Medical History:   Diagnosis Date    Allergic     Allergic rhinitis     Anorexia nervosa in remission     Anxiety     Arthritis 5/8/2014    Back pain     Bipolar disorder (HCC)     Depression     Dermatitis 10/16/2021    Disease of thyroid gland     Diverticulitis of colon 9/20/2015    Dizziness     Ear problems     Eating disorder     Esophageal reflux 08/15/2013    GERD  (gastroesophageal reflux disease) 8/15/2013    Headache(784.0)     Headache, tension-type     Hypertension     Hypothyroid     Idiopathic intracranial hypertension     Lumbar degenerative disc disease 05/08/2014    Migraine     Nasal congestion     Nosebleed     Obesity     Obsessive-compulsive disorder     Otitis media     Overactive bladder     Psychiatric disorder     Restless leg syndrome, controlled     Seasonal allergies     Sinusitis     Sleep apnea     Sleep difficulties     Suicide and self-inflicted injury (HCC)     Tinnitus     TMJ dysfunction     Tonsillitis     Transcranial Magnetic Stimulation 09/06/2021    Urinary tract infection     Vision loss        Past Surgical History:   Procedure Laterality Date    DENTAL SURGERY      wisdom teeth-at [age] years. Other surgery dental extraxtion of bone spur (10 years ago)    IR LUMBAR PUNCTURE  02/26/2019    SINUS ENDOSCOPY      SINUS SURGERY      TONSILLECTOMY         Family History   Problem Relation Age of Onset    Mental illness Mother     Depression Mother     Suicide Attempts Mother     Hypertension Mother     Diabetes Mother     Other Mother         bicuspid aortic valve    Anxiety disorder Mother     Psychiatric Illness Mother     Schizoaffective Disorder  Mother     Anemia Mother     Hypertension Father     Hypothyroidism Father     Thyroid disease Father     Hypertension Brother     Cancer Maternal Grandmother     Heart disease Maternal Grandmother     Stroke Maternal Grandmother     Breast cancer Maternal Grandmother     Skin cancer Maternal Grandmother     Arthritis Maternal Grandmother     Pneumonia Maternal Grandfather     Cancer Maternal Grandfather     Dementia Maternal Grandfather     COPD Maternal Grandfather     Cancer Paternal Grandmother     Pneumonia Paternal Grandfather     Arthritis Family     Breast cancer Family     Osteopenia Family     Osteoporosis Family     Hypertension Family     Transient ischemic attack Family   "        Medications have been verified.        Objective   /77   Pulse 65   Temp 98 °F (36.7 °C)   Resp 18   Ht 5' 7\" (1.702 m)   Wt (!) 159 kg (350 lb)   SpO2 100%   BMI 54.82 kg/m²        Physical Exam     Physical Exam  Constitutional:       Appearance: She is well-developed. She is not diaphoretic.   HENT:      Head: Normocephalic.      Nose:      Right Turbinates: Enlarged.      Left Turbinates: Enlarged.      Comments: -Green discharge is noted in the nares  Eyes:      General:         Right eye: No discharge.         Left eye: No discharge.      Pupils: Pupils are equal, round, and reactive to light.   Neck:      Thyroid: No thyromegaly.   Cardiovascular:      Rate and Rhythm: Normal rate.      Heart sounds: No murmur heard.  Pulmonary:      Effort: Pulmonary effort is normal. No respiratory distress.      Breath sounds: Rhonchi present. No wheezing or rales.   Chest:      Chest wall: No tenderness.   Abdominal:      General: There is no distension.      Palpations: Abdomen is soft.      Tenderness: There is no abdominal tenderness. There is no guarding or rebound.   Musculoskeletal:         General: Normal range of motion.      Cervical back: Normal range of motion.   Lymphadenopathy:      Cervical: No cervical adenopathy.   Skin:     General: Skin is warm.   Neurological:      Mental Status: She is alert and oriented to person, place, and time.               -Personally interpreted the x-ray and reviewed it with the patient.  There is no acute infiltrate.    "

## 2024-01-18 ENCOUNTER — TELEPHONE (OUTPATIENT)
Dept: BARIATRICS | Facility: CLINIC | Age: 39
End: 2024-01-18

## 2024-01-18 DIAGNOSIS — E66.01 MORBID OBESITY (HCC): Primary | ICD-10-CM

## 2024-01-18 NOTE — TELEPHONE ENCOUNTER
Name of medication/s patient is requesting to be refilled Wegovy    Medication dosage 1.7    How often is medication being taken once a week    Is patient requesting 30 or 90 day supply, 30    Name of Pharmacy [facility]    Last Visit 12/28/2023  Next Visit 2/1/2024     Patient might need a prior authorization.  She takes her last pen of the 1 tomorrow

## 2024-01-19 ENCOUNTER — TELEPHONE (OUTPATIENT)
Dept: FAMILY MEDICINE CLINIC | Facility: CLINIC | Age: 39
End: 2024-01-19

## 2024-01-19 NOTE — TELEPHONE ENCOUNTER
Patient called in asking if we can help fill out a medical records release for imaging disc to be sent to patient. Patient wanted it to be filled out correctly. Patient was going to stop into office to fill it out. Patient is going to stop in around 3:30 PM.

## 2024-01-19 NOTE — TELEPHONE ENCOUNTER
Called patient that she does not have to come into the office. It was taken care of. No answer, left voicemail.

## 2024-01-19 NOTE — TELEPHONE ENCOUNTER
Ayesha called medical records and spoke to Mellisa. Patient does not have to come into the office and will confirm it was received.

## 2024-01-23 DIAGNOSIS — G43.709 CHRONIC MIGRAINE WITHOUT AURA WITHOUT STATUS MIGRAINOSUS, NOT INTRACTABLE: ICD-10-CM

## 2024-01-24 NOTE — TELEPHONE ENCOUNTER
Called pt. Left a detailed message on her voice mail requesting a return call or a Decision Lenst message with the requested information from Dr. Cueva.

## 2024-01-24 NOTE — TELEPHONE ENCOUNTER
Piyush Cueva MD   to Neurology [place] Clinical Team 4       1/23/24  2:24 PM  This is still a titration rx.  Need to know the current dose and the effectiveness/side effects to refill appropriately...  Thanks

## 2024-01-25 NOTE — TELEPHONE ENCOUNTER
Patient called in regarding my voicemail I left the other week explaining that medical records were taken care of. Patient was very appreciative of our office.

## 2024-01-26 RX ORDER — ACETAZOLAMIDE 250 MG/1
TABLET ORAL
Qty: 360 TABLET | Refills: 2 | Status: SHIPPED | OUTPATIENT
Start: 2024-01-26

## 2024-01-26 NOTE — TELEPHONE ENCOUNTER
Spoke with pt. She confirmed that she is taking acetazolamide 250 mg 2 tabs BID. Pt stated that when she had the flu a few weeks back, her nephrologist had her reduce the dose and was monitoring her labs. She was given the okay to resume 2 tabs BID, and that is what she is currently taking. Her nephrologist continues to monitor her labs. Pt has not had any side effects, and states the medication is helping.

## 2024-01-29 NOTE — TELEPHONE ENCOUNTER
received vm from 1/25 at 2:06pm-hi my name is Gita barton date of birth, august 18th, 1985. I received a message yesterday and I don't know what was said. I basically, it sounded like somebody was like a smurf trying to talk, i guess somehow the message was messed up when it was left. Um, like I said, it said like, it sounded like someone was like a smurf talking and it was like very high pitched. So whatever was said, I was not able to make any of it out. So I'm just calling. So I can clarify what was said. I have no clue why i was being called. If you can call me back, I would really appreciate it. It's [phone]. Once again, my name is Gita, the last name is mick, and the YOB: 1985. Thank you very much lee.  --------------------------------------------  Already addressed

## 2024-02-01 ENCOUNTER — OFFICE VISIT (OUTPATIENT)
Dept: BARIATRICS | Facility: CLINIC | Age: 39
End: 2024-02-01
Payer: COMMERCIAL

## 2024-02-01 VITALS
DIASTOLIC BLOOD PRESSURE: 72 MMHG | TEMPERATURE: 98.1 F | HEIGHT: 67 IN | SYSTOLIC BLOOD PRESSURE: 122 MMHG | RESPIRATION RATE: 20 BRPM | OXYGEN SATURATION: 97 % | WEIGHT: 293 LBS | BODY MASS INDEX: 45.99 KG/M2 | HEART RATE: 79 BPM

## 2024-02-01 DIAGNOSIS — E66.01 MORBID OBESITY (HCC): Primary | ICD-10-CM

## 2024-02-01 DIAGNOSIS — F32.A DEPRESSION: ICD-10-CM

## 2024-02-01 DIAGNOSIS — R73.03 PREDIABETES: ICD-10-CM

## 2024-02-01 PROCEDURE — 99214 OFFICE O/P EST MOD 30 MIN: CPT | Performed by: PHYSICIAN ASSISTANT

## 2024-02-01 NOTE — ASSESSMENT & PLAN NOTE
-hX OCD, EILEEN  -followed by psychiatry  -hx Binge eating - on Vyvanse  -Remains on  Vraylar  -No longer on Wellbutrin

## 2024-02-01 NOTE — PROGRESS NOTES
Assessment/Plan:    Morbid obesity (HCC)  -Patient is pursuing Conservative Program  -Initial weight loss goal of 5-10% weight loss for improved health  -Screening labs: reviewed, up to date  -dietary/lifestyle changes, continue to work with BH  -On Topamax for headaches  -No longer on Wellbutrin for mood  -avoid Phentermine  -Was on Ozenmpic and now switched to Wegovy. Just started 1.7mg dose  -She will work on increasing water to goal, reducing artificially sweetened drinks and increasing fruit and veggies  -medication agreement signed    Initial: 421 lbs  Last OV: 376 lbs  Current: 351.4 lbs  Change: -69.6 lbs  Goal: wants to feel healthy    Depression  -hX OCD, EILEEN  -followed by psychiatry  -hx Binge eating - on Vyvanse  -Remains on  Vraylar  -No longer on Wellbutrin      Goals:    Food log (ie.) [email or web address],[email or web address],[email or web address].com,etc.   No sugary beverages. At least 64oz of water daily.  Increase physical activity by 10 minutes daily. Gradually increase physical activity to a goal of 5 days per week for 30 minutes of MODERATE intensity PLUS 2 days per week of FULL BODY resistance training      Follow up in approximately 3 months with Non-Surgical Physician/Advanced Practitioner.     Diagnoses and all orders for this visit:    Morbid obesity (HCC)    Depression    Prediabetes          Subjective:   Chief Complaint   Patient presents with    Follow-up        Patient ID: Gita Rendon  is a 38 y.o. female with excess weight/obesity here to pursue weight managment.  Patient is pursuing Conservative Program.     HPI Patient presents for [facility] follow up. Just started on Wegovy 1.7mg. Reports tolerating well so far. Has some constipation but is doing well with Linzess and colace    -No longer on Wellbutrin  -Now seeing [facility]. Reports will be needing spinal tap. Report hx IIH. May need a shunt placed    Hydration: water 1 bottle, diet iced tea, rare coffee    B: Life  "or frosted shredded wheat + 2% milk or Instant oatmeal  S: skips  L: Premiere protein shake  S: string cheese or applesauce   D: Pierogies or chicken strips + veggie Or frozen pizza  S: varies; Cheez it, daniel crackers      Wt Readings from Last 10 Encounters:   02/01/24 (!) 159 kg (351 lb 6.4 oz)   01/09/24 (!) 159 kg (350 lb)   11/19/23 (!) 162 kg (357 lb)   11/08/23 (!) 170 kg (374 lb 6.4 oz)   10/26/23 (!) 171 kg (376 lb)   10/24/23 (!) 171 kg (376 lb 8 oz)   09/22/23 (!) 172 kg (379 lb 3.2 oz)   09/21/23 (!) 172 kg (379 lb 3.2 oz)   08/02/23 (!) 176 kg (389 lb)   07/20/23 (!) 177 kg (389 lb 6.4 oz)        The following portions of the patient's history were reviewed and updated as appropriate: allergies, current medications, past family history, past medical history, past social history, past surgical history, and problem list.    Review of Systems   Gastrointestinal:  Positive for constipation and nausea. Negative for abdominal pain and vomiting.   Psychiatric/Behavioral:  Positive for dysphoric mood (reports overall stable).        Objective:    /72 (BP Location: Left arm, Patient Position: Sitting, Cuff Size: Large)   Pulse 79   Temp 98.1 °F (36.7 °C)   Resp 20   Ht 5' 7\" (1.702 m)   Wt (!) 159 kg (351 lb 6.4 oz)   SpO2 97%   BMI 55.04 kg/m²      Physical Exam  Vitals and nursing note reviewed.   Constitutional:       General: She is not in acute distress.     Appearance: She is obese. She is not ill-appearing or toxic-appearing.   HENT:      Head: Normocephalic and atraumatic.      Mouth/Throat:      Mouth: Mucous membranes are moist.   Eyes:      General: No scleral icterus.  Pulmonary:      Effort: Pulmonary effort is normal. No respiratory distress.   Abdominal:      Comments: Obese, protuberant   Musculoskeletal:         General: Normal range of motion.   Skin:     General: Skin is dry.   Neurological:      Mental Status: She is alert and oriented to person, place, and time.   Psychiatric: "         Mood and Affect: Mood normal.         Behavior: Behavior normal.         Thought Content: Thought content normal.         Judgment: Judgment normal.

## 2024-02-01 NOTE — ASSESSMENT & PLAN NOTE
-Patient is pursuing Conservative Program  -Initial weight loss goal of 5-10% weight loss for improved health  -Screening labs: reviewed, up to date  -dietary/lifestyle changes, continue to work with BH  -On Topamax for headaches  -No longer on Wellbutrin for mood  -avoid Phentermine  -Was on Ozenmpic and now switched to Wegovy. Just started 1.7mg dose  -She will work on increasing water to goal, reducing artificially sweetened drinks and increasing fruit and veggies  -medication agreement signed    Initial: 421 lbs  Last OV: 376 lbs  Current: 351.4 lbs  Change: -69.6 lbs  Goal: wants to feel healthy

## 2024-02-04 ENCOUNTER — HOSPITAL ENCOUNTER (EMERGENCY)
Facility: HOSPITAL | Age: 39
Discharge: HOME | End: 2024-02-04
Attending: EMERGENCY MEDICINE | Admitting: EMERGENCY MEDICINE
Payer: COMMERCIAL

## 2024-02-04 VITALS
OXYGEN SATURATION: 99 % | HEART RATE: 60 BPM | TEMPERATURE: 97.5 F | SYSTOLIC BLOOD PRESSURE: 138 MMHG | RESPIRATION RATE: 17 BRPM | DIASTOLIC BLOOD PRESSURE: 87 MMHG

## 2024-02-04 DIAGNOSIS — J32.9 SINUSITIS: ICD-10-CM

## 2024-02-04 DIAGNOSIS — G89.29 CHRONIC HEADACHE DISORDER: Primary | ICD-10-CM

## 2024-02-04 DIAGNOSIS — R51.9 CHRONIC HEADACHE DISORDER: Primary | ICD-10-CM

## 2024-02-04 LAB
ALBUMIN SERPL BCP-MCNC: 4.1 G/DL (ref 3.5–5)
ALP SERPL-CCNC: 70 U/L (ref 34–104)
ALT SERPL W P-5'-P-CCNC: 15 U/L (ref 7–52)
ANION GAP SERPL CALCULATED.3IONS-SCNC: 11 MMOL/L
AST SERPL W P-5'-P-CCNC: 14 U/L (ref 13–39)
BASOPHILS # BLD AUTO: 0.08 THOUSANDS/ÂΜL (ref 0–0.1)
BASOPHILS NFR BLD AUTO: 1 % (ref 0–1)
BILIRUB SERPL-MCNC: 0.3 MG/DL (ref 0.2–1)
BUN SERPL-MCNC: 24 MG/DL (ref 5–25)
CALCIUM SERPL-MCNC: 9.9 MG/DL (ref 8.4–10.2)
CHLORIDE SERPL-SCNC: 110 MMOL/L (ref 96–108)
CO2 SERPL-SCNC: 19 MMOL/L (ref 21–32)
CREAT SERPL-MCNC: 0.86 MG/DL (ref 0.6–1.3)
EOSINOPHIL # BLD AUTO: 0.32 THOUSAND/ÂΜL (ref 0–0.61)
EOSINOPHIL NFR BLD AUTO: 2 % (ref 0–6)
ERYTHROCYTE [DISTWIDTH] IN BLOOD BY AUTOMATED COUNT: 13.7 % (ref 11.6–15.1)
GFR SERPL CREATININE-BSD FRML MDRD: 85 ML/MIN/1.73SQ M
GLUCOSE SERPL-MCNC: 76 MG/DL (ref 65–140)
HCT VFR BLD AUTO: 45.6 % (ref 34.8–46.1)
HGB BLD-MCNC: 14.2 G/DL (ref 11.5–15.4)
IMM GRANULOCYTES # BLD AUTO: 0.05 THOUSAND/UL (ref 0–0.2)
IMM GRANULOCYTES NFR BLD AUTO: 0 % (ref 0–2)
LYMPHOCYTES # BLD AUTO: 3.21 THOUSANDS/ÂΜL (ref 0.6–4.47)
LYMPHOCYTES NFR BLD AUTO: 25 % (ref 14–44)
MCH RBC QN AUTO: 28.5 PG (ref 26.8–34.3)
MCHC RBC AUTO-ENTMCNC: 31.1 G/DL (ref 31.4–37.4)
MCV RBC AUTO: 91 FL (ref 82–98)
MONOCYTES # BLD AUTO: 0.96 THOUSAND/ÂΜL (ref 0.17–1.22)
MONOCYTES NFR BLD AUTO: 7 % (ref 4–12)
NEUTROPHILS # BLD AUTO: 8.48 THOUSANDS/ÂΜL (ref 1.85–7.62)
NEUTS SEG NFR BLD AUTO: 65 % (ref 43–75)
NRBC BLD AUTO-RTO: 0 /100 WBCS
PLATELET # BLD AUTO: 292 THOUSANDS/UL (ref 149–390)
PMV BLD AUTO: 10.2 FL (ref 8.9–12.7)
POTASSIUM SERPL-SCNC: 3.8 MMOL/L (ref 3.5–5.3)
PROT SERPL-MCNC: 7 G/DL (ref 6.4–8.4)
RBC # BLD AUTO: 4.99 MILLION/UL (ref 3.81–5.12)
SODIUM SERPL-SCNC: 140 MMOL/L (ref 135–147)
WBC # BLD AUTO: 13.1 THOUSAND/UL (ref 4.31–10.16)

## 2024-02-04 PROCEDURE — 96375 TX/PRO/DX INJ NEW DRUG ADDON: CPT

## 2024-02-04 PROCEDURE — 36415 COLL VENOUS BLD VENIPUNCTURE: CPT | Performed by: PHYSICIAN ASSISTANT

## 2024-02-04 PROCEDURE — 85025 COMPLETE CBC W/AUTO DIFF WBC: CPT | Performed by: PHYSICIAN ASSISTANT

## 2024-02-04 PROCEDURE — 99284 EMERGENCY DEPT VISIT MOD MDM: CPT | Performed by: PHYSICIAN ASSISTANT

## 2024-02-04 PROCEDURE — 99283 EMERGENCY DEPT VISIT LOW MDM: CPT

## 2024-02-04 PROCEDURE — 96365 THER/PROPH/DIAG IV INF INIT: CPT

## 2024-02-04 PROCEDURE — 80053 COMPREHEN METABOLIC PANEL: CPT | Performed by: PHYSICIAN ASSISTANT

## 2024-02-04 RX ORDER — CLINDAMYCIN HYDROCHLORIDE 300 MG/1
300 CAPSULE ORAL 3 TIMES DAILY
Qty: 21 CAPSULE | Refills: 0 | Status: SHIPPED | OUTPATIENT
Start: 2024-02-04 | End: 2024-02-11

## 2024-02-04 RX ORDER — MAGNESIUM SULFATE HEPTAHYDRATE 40 MG/ML
2 INJECTION, SOLUTION INTRAVENOUS ONCE
Status: COMPLETED | OUTPATIENT
Start: 2024-02-04 | End: 2024-02-04

## 2024-02-04 RX ORDER — METOCLOPRAMIDE HYDROCHLORIDE 5 MG/ML
10 INJECTION INTRAMUSCULAR; INTRAVENOUS ONCE
Status: COMPLETED | OUTPATIENT
Start: 2024-02-04 | End: 2024-02-04

## 2024-02-04 RX ORDER — DIPHENHYDRAMINE HYDROCHLORIDE 50 MG/ML
25 INJECTION INTRAMUSCULAR; INTRAVENOUS ONCE
Status: COMPLETED | OUTPATIENT
Start: 2024-02-04 | End: 2024-02-04

## 2024-02-04 RX ORDER — KETOROLAC TROMETHAMINE 30 MG/ML
30 INJECTION, SOLUTION INTRAMUSCULAR; INTRAVENOUS ONCE
Status: COMPLETED | OUTPATIENT
Start: 2024-02-04 | End: 2024-02-04

## 2024-02-04 RX ADMIN — KETOROLAC TROMETHAMINE 30 MG: 30 INJECTION, SOLUTION INTRAMUSCULAR; INTRAVENOUS at 19:15

## 2024-02-04 RX ADMIN — MAGNESIUM SULFATE HEPTAHYDRATE 2 G: 40 INJECTION, SOLUTION INTRAVENOUS at 19:18

## 2024-02-04 RX ADMIN — SODIUM CHLORIDE 1000 ML: 0.9 INJECTION, SOLUTION INTRAVENOUS at 19:14

## 2024-02-04 RX ADMIN — DIPHENHYDRAMINE HYDROCHLORIDE 25 MG: 50 INJECTION, SOLUTION INTRAMUSCULAR; INTRAVENOUS at 19:14

## 2024-02-04 RX ADMIN — METOCLOPRAMIDE 10 MG: 5 INJECTION, SOLUTION INTRAMUSCULAR; INTRAVENOUS at 19:17

## 2024-02-04 NOTE — ED PROVIDER NOTES
History  Chief Complaint   Patient presents with    Headache     Patient states she's had a headache since Thursday afternoon and unable to get rid of it. Hx of migraines. Pain 7/10 at this time. Denies any blurred vision     38 year old female with long standing history of headaches (pt notes prior diagnosis of intracranial hypertension age 15), HTN, GERD, anxiety/depression, bipolar disorder presenting for evaluation of headache.  Pt reports she suffers from regular, frequent headaches.  She states she may have only 2 days per month where she doesn't have a headache.  She reports this headache started on Thursday.  She reports her headache today is similar to prior.  She notes pain in the back of her head and top of her head with pressure across her forehead.  No reported head injury or trauma.  Denies fever, chills.  No neck stiffness.  Denies any visual disturbance.  She notes light and smells bother her.  Denies numbness, tingling, focal weakness.  No facial droop.  No difficultly with speech.  Denies chest pain, SOB.  Has had nausea.  Denies V/D, abdominal pain.  She notes she was sick with flu last month.  She indicates she still has residual sinus congestion from this.  She complains of a lot of sinus pressure and pain.    No reported aggravating or alleviating factors.  Has tried her maxalt without relief.  She has been taking nurtec every other day.      History provided by:  Patient and medical records   used: No    Headache  Pain location:  Frontal  Chronicity:  Recurrent  Similar to prior headaches: yes    Relieved by:  Nothing  Worsened by:  Nothing  Ineffective treatments:  Prescription medications  Associated symptoms: congestion, fatigue, nausea, photophobia and sinus pressure    Associated symptoms: no abdominal pain, no back pain, no blurred vision, no cough, no diarrhea, no dizziness, no ear pain, no eye pain, no fever, no focal weakness, no loss of balance, no myalgias, no  neck pain, no neck stiffness, no numbness, no paresthesias, no sore throat, no syncope, no tingling, no visual change, no vomiting and no weakness        Prior to Admission Medications   Prescriptions Last Dose Informant Patient Reported? Taking?   Auvelity  MG TBCR   Yes No   Sig: Take 1 tablet by mouth 2 (two) times a day   Cequa 0.09 % SOLN  Self No No   Sig: Apply 1 drop to eye 2 (two) times a day   Galcanezumab-gnlm (Emgality) 120 MG/ML SOAJ  Self No No   Sig: Inject 120 mg under the skin every 30 (thirty) days   LORazepam (ATIVAN) 0.5 mg tablet  Self Yes No   Sig: Take 0.5 mg by mouth 2 (two) times a day as needed for anxiety   Multiple Vitamin (MULTIVITAMIN) capsule  Self Yes No   Sig: Take 1 capsule by mouth daily   PARoxetine (PAXIL) 40 MG tablet   No No   Sig: Take 1 tablet (40 mg total) by mouth daily Do not start before 2023.   Semaglutide-Weight Management (WEGOVY) 1.7 MG/0.75ML   No No   Sig: Inject 0.75 mL (1.7 mg total) under the skin once a week for 4 doses   Vraylar 3 MG capsule  Self Yes No   Sig: Take 3 mg by mouth daily Daily in the evening   Patient not taking: Reported on 2024   Vraylar 6 MG capsule  Self Yes No   Sig: Take 6 mg by mouth daily Daily in the morning   Vyvanse 70 MG capsule  Self Yes No   Sig: Take 70 mg by mouth daily   acetaZOLAMIDE (DIAMOX) 250 mg tablet   No No   Si tablets by mouth twice a day   celecoxib (CeleBREX) 200 mg capsule   No No   Sig: take 1 capsule by mouth twice a day   cholecalciferol (VITAMIN D3) 1,000 units tablet  Self Yes No   Sig: Take 5,000 Units by mouth daily   fluticasone (FLONASE) 50 mcg/act nasal spray  Self No No   Si sprays into each nostril 2 (two) times a day   folic acid (FOLVITE) 1 mg tablet  Self Yes No   Sig: Take 1 mg by mouth daily   furosemide (LASIX) 40 mg tablet   No No   Sig: Take 1 tablet (40 mg total) by mouth if needed (take 40 mg as needed for swelling)   gabapentin (Neurontin) 600 MG tablet  Self No No    Sig: Take 1 tablet (600 mg total) by mouth 2 (two) times a day   Patient taking differently: Take 600 mg by mouth 2 (two) times a day Half a tab in the morning and 1.5 tabs in the evening   levothyroxine 125 mcg tablet   No No   Sig: Take 1 tablet (125 mcg total) by mouth daily in the early morning   linaCLOtide (Linzess) 290 MCG CAPS   No No   Sig: Take 1 capsule by mouth daily   loratadine (CLARITIN) 10 mg tablet  Self No No   Sig: Take 1 tablet (10 mg total) by mouth daily   melatonin 3 mg  Self No No   Sig: Take 1 tablet (3 mg total) by mouth daily at bedtime   Patient not taking: Reported on 1/9/2024   metFORMIN (GLUCOPHAGE) 500 mg tablet  Self No No   Sig: Take 1 tablet (500 mg total) by mouth daily with breakfast   omeprazole (PriLOSEC) 20 mg delayed release capsule   No No   Sig: Take 1 capsule (20 mg total) by mouth 2 (two) times a day   propranolol (INDERAL) 20 mg tablet  Self Yes No   Sig: Take 20 mg by mouth every 12 (twelve) hours   rOPINIRole (REQUIP) 4 mg tablet  Self Yes No   Sig: Take 4 mg by mouth daily at bedtime   rimegepant sulfate (Nurtec) 75 mg TBDP   No No   Sig: TAKE 75MG BY MOUTH EVERY OTHER DAY. IF THERE IS A MIGRAINE ON A NON-NURTEC DAY ,YOU CAN TAKE A DOSE AND SKIP THE FOLLOWING DAY   rizatriptan (MAXALT) 10 mg tablet   No No   Sig: Take 1 tablet (10 mg total) by mouth once as needed for migraine May repeat every 2 hours if needed, max 30mg/24 hours   spironolactone (ALDACTONE) 50 mg tablet   No No   Sig: Take 1 tablet (50 mg total) by mouth daily   topiramate (TOPAMAX) 100 mg tablet   No No   Sig: Take 1.5 tablets (150 mg total) by mouth 2 (two) times a day      Facility-Administered Medications: None       Past Medical History:   Diagnosis Date    Allergic     Allergic rhinitis     Anorexia nervosa in remission     Anxiety     Arthritis 5/8/2014    Back pain     Bipolar disorder (HCC)     Depression     Dermatitis 10/16/2021    Disease of thyroid gland     Diverticulitis of colon  9/20/2015    Dizziness     Ear problems     Eating disorder     Esophageal reflux 08/15/2013    GERD (gastroesophageal reflux disease) 8/15/2013    Headache(784.0)     Headache, tension-type     Hypertension     Hypothyroid     Idiopathic intracranial hypertension     Lumbar degenerative disc disease 05/08/2014    Migraine     Nasal congestion     Nosebleed     Obesity     Obsessive-compulsive disorder     Otitis media     Overactive bladder     Psychiatric disorder     Restless leg syndrome, controlled     Seasonal allergies     Sinusitis     Sleep apnea     Sleep difficulties     Suicide and self-inflicted injury (HCC)     Tinnitus     TMJ dysfunction     Tonsillitis     Transcranial Magnetic Stimulation 09/06/2021    Urinary tract infection     Vision loss        Past Surgical History:   Procedure Laterality Date    DENTAL SURGERY      wisdom teeth-at [age] years. Other surgery dental extraxtion of bone spur (10 years ago)    IR LUMBAR PUNCTURE  02/26/2019    SINUS ENDOSCOPY      SINUS SURGERY      TONSILLECTOMY         Family History   Problem Relation Age of Onset    Mental illness Mother     Depression Mother     Suicide Attempts Mother     Hypertension Mother     Diabetes Mother     Other Mother         bicuspid aortic valve    Anxiety disorder Mother     Psychiatric Illness Mother     Schizoaffective Disorder  Mother     Anemia Mother     Hypertension Father     Hypothyroidism Father     Thyroid disease Father     Hypertension Brother     Cancer Maternal Grandmother     Heart disease Maternal Grandmother     Stroke Maternal Grandmother     Breast cancer Maternal Grandmother     Skin cancer Maternal Grandmother     Arthritis Maternal Grandmother     Pneumonia Maternal Grandfather     Cancer Maternal Grandfather     Dementia Maternal Grandfather     COPD Maternal Grandfather     Cancer Paternal Grandmother     Pneumonia Paternal Grandfather     Arthritis Family     Breast cancer Family     Osteopenia Family      Osteoporosis Family     Hypertension Family     Transient ischemic attack Family      I have reviewed and agree with the history as documented.    E-Cigarette/Vaping    E-Cigarette Use Never User      E-Cigarette/Vaping Substances    Nicotine No     THC No     CBD No     Flavoring No     Other No     Unknown No      Social History     Tobacco Use    Smoking status: Never     Passive exposure: Never    Smokeless tobacco: Never   Vaping Use    Vaping status: Never Used   Substance Use Topics    Alcohol use: Never    Drug use: Never       Review of Systems   Constitutional:  Positive for fatigue. Negative for chills and fever.   HENT:  Positive for congestion and sinus pressure. Negative for ear pain, rhinorrhea and sore throat.    Eyes:  Positive for photophobia. Negative for blurred vision, pain and visual disturbance.   Respiratory: Negative.  Negative for cough, shortness of breath and wheezing.    Cardiovascular: Negative.  Negative for chest pain, palpitations, leg swelling and syncope.   Gastrointestinal:  Positive for nausea. Negative for abdominal pain, constipation, diarrhea and vomiting.   Genitourinary: Negative.  Negative for dysuria, flank pain, frequency and hematuria.   Musculoskeletal: Negative.  Negative for back pain, myalgias, neck pain and neck stiffness.   Skin: Negative.  Negative for rash.   Neurological:  Positive for headaches. Negative for dizziness, focal weakness, syncope, facial asymmetry, speech difficulty, weakness, light-headedness, numbness, paresthesias and loss of balance.   Psychiatric/Behavioral: Negative.  Negative for confusion.    All other systems reviewed and are negative.      Physical Exam  Physical Exam  Vitals and nursing note reviewed.   Constitutional:       General: She is awake. She is not in acute distress.     Appearance: She is well-developed. She is obese. She is not toxic-appearing or diaphoretic.   HENT:      Head: Normocephalic and atraumatic.      Right Ear:  Hearing, tympanic membrane, ear canal and external ear normal.      Left Ear: Hearing, tympanic membrane, ear canal and external ear normal.      Nose: Congestion present.      Right Sinus: Maxillary sinus tenderness present.      Left Sinus: Maxillary sinus tenderness present.      Mouth/Throat:      Lips: Pink.      Mouth: Mucous membranes are moist. No oral lesions.      Tongue: Tongue does not deviate from midline.      Pharynx: Oropharynx is clear. Uvula midline. No oropharyngeal exudate.   Eyes:      General: Lids are normal. No visual field deficit or scleral icterus.     Extraocular Movements: Extraocular movements intact.      Conjunctiva/sclera: Conjunctivae normal.      Pupils: Pupils are equal, round, and reactive to light.      Comments: +glasses   Neck:      Trachea: Trachea and phonation normal. No tracheal deviation.   Cardiovascular:      Rate and Rhythm: Normal rate and regular rhythm.      Pulses: Normal pulses.           Radial pulses are 2+ on the right side and 2+ on the left side.      Heart sounds: Normal heart sounds, S1 normal and S2 normal. No murmur heard.  Pulmonary:      Effort: Pulmonary effort is normal. No tachypnea or respiratory distress.      Breath sounds: Normal breath sounds. No wheezing, rhonchi or rales.   Abdominal:      General: Bowel sounds are normal. There is no distension.      Palpations: Abdomen is soft.      Tenderness: There is no abdominal tenderness. There is no guarding or rebound.   Musculoskeletal:      Cervical back: Normal range of motion and neck supple. No rigidity.      Right lower leg: No edema.      Left lower leg: No edema.   Skin:     General: Skin is warm and dry.      Capillary Refill: Capillary refill takes less than 2 seconds.      Findings: No rash.   Neurological:      General: No focal deficit present.      Mental Status: She is alert and oriented to person, place, and time.      GCS: GCS eye subscore is 4. GCS verbal subscore is 5. GCS motor  subscore is 6.      Cranial Nerves: Cranial nerves 2-12 are intact. No cranial nerve deficit, dysarthria or facial asymmetry.      Sensory: Sensation is intact. No sensory deficit.      Motor: Motor function is intact. No weakness or abnormal muscle tone.      Coordination: Coordination is intact.      Gait: Gait normal.      Comments: Pt ambulatory to exam room, steady gait.   Psychiatric:         Mood and Affect: Mood normal.         Speech: Speech normal.         Behavior: Behavior normal. Behavior is cooperative.         Vital Signs  ED Triage Vitals [02/04/24 1819]   Temperature Pulse Respirations Blood Pressure SpO2   97.5 °F (36.4 °C) 60 17 138/87 99 %      Temp Source Heart Rate Source Patient Position - Orthostatic VS BP Location FiO2 (%)   Temporal Monitor -- -- --      Pain Score       7           Vitals:    02/04/24 1819   BP: 138/87   Pulse: 60         Visual Acuity      ED Medications  Medications   ketorolac (TORADOL) injection 30 mg (30 mg Intravenous Given 2/4/24 1915)   metoclopramide (REGLAN) injection 10 mg (10 mg Intravenous Given 2/4/24 1917)   diphenhydrAMINE (BENADRYL) injection 25 mg (25 mg Intravenous Given 2/4/24 1914)   magnesium sulfate 2 g/50 mL IVPB (premix) 2 g (0 g Intravenous Stopped 2/4/24 2018)   sodium chloride 0.9 % bolus 1,000 mL (0 mL Intravenous Stopped 2/4/24 2018)       Diagnostic Studies  Results Reviewed       Procedure Component Value Units Date/Time    Comprehensive metabolic panel [[id number]]  (Abnormal) Collected: 02/04/24 1913    Lab Status: Final result Specimen: Blood from Arm, Left Updated: 02/04/24 1955     Sodium 140 mmol/L      Potassium 3.8 mmol/L      Chloride 110 mmol/L      CO2 19 mmol/L      ANION GAP 11 mmol/L      BUN 24 mg/dL      Creatinine 0.86 mg/dL      Glucose 76 mg/dL      Calcium 9.9 mg/dL      AST 14 U/L      ALT 15 U/L      Alkaline Phosphatase 70 U/L      Total Protein 7.0 g/dL      Albumin 4.1 g/dL      Total Bilirubin 0.30 mg/dL      eGFR 85  ml/min/1.73sq m     Narrative:      National Kid[employer] Foundation guidelines for Chronic Kidney Disease (CKD):     Stage 1 with normal or high GFR (GFR > 90 mL/min/1.73 square meters)    Stage 2 Mild CKD (GFR = 60-89 mL/min/1.73 square meters)    Stage 3A Moderate CKD (GFR = 45-59 mL/min/1.73 square meters)    Stage 3B Moderate CKD (GFR = 30-44 mL/min/1.73 square meters)    Stage 4 Severe CKD (GFR = 15-29 mL/min/1.73 square meters)    Stage 5 End Stage CKD (GFR <15 mL/min/1.73 square meters)  Note: GFR calculation is accurate only with a steady state creatinine    CBC and differential [[id number]]  (Abnormal) Collected: 02/04/24 1913    Lab Status: Final result Specimen: Blood from Arm, Left Updated: 02/04/24 1937     WBC 13.10 Thousand/uL      RBC 4.99 Million/uL      Hemoglobin 14.2 g/dL      Hematocrit 45.6 %      MCV 91 fL      MCH 28.5 pg      MCHC 31.1 g/dL      RDW 13.7 %      MPV 10.2 fL      Platelets 292 Thousands/uL      nRBC 0 /100 WBCs      Neutrophils Relative 65 %      Immat GRANS % 0 %      Lymphocytes Relative 25 %      Monocytes Relative 7 %      Eosinophils Relative 2 %      Basophils Relative 1 %      Neutrophils Absolute 8.48 Thousands/µL      Immature Grans Absolute 0.05 Thousand/uL      Lymphocytes Absolute 3.21 Thousands/µL      Monocytes Absolute 0.96 Thousand/µL      Eosinophils Absolute 0.32 Thousand/µL      Basophils Absolute 0.08 Thousands/µL     POCT pregnancy, urine [[id number]]     Lab Status: No result Specimen: Urine                    No orders to display              Procedures  Procedures         ED Course  ED Course as of 02/04/24 2124   [place] Feb 04, 2024   1850 Reviewed consult from [place] Jan 2024.   1940 WBC(!): 13.10  Elevated, previously normal a month ago   1940 Hemoglobin: 14.2   1940 Platelet Count: 292   1956 GLUCOSE: 76   1957 Creatinine: 0.86   1957 BUN: 24   1957 Sodium: 140   1957 Potassium: 3.8   1957 Chloride(!): 110   1957 Carbon Dioxide(!): 19    1957 ANION GAP: 11   1957 Calcium: 9.9   1957 AST: 14   1957 ALT: 15   1957 ALK PHOS: 70   1957 Total Protein: 7.0   1957 Albumin: 4.1   1957 Total Bilirubin: 0.30   1957 GFR, Calculated: 85   2003 Pt reassessed.  She reports feeling improved after migraine cocktail and feels well enough that she wants to go home.  Given ongoing sinus congestion, pressure which started after flu illness about a month ago, will place on antibiotics.  She indicates she's had a sinus infection before after covid and clindamycin had worked really well for her.  She states at that time actually had several weeks where she was headache free.  She does have upcoming appts with her specialists.   2015 Has appt with ENT on Friday.  Also going to see ophthalmologist on Wednesday.                                             Medical Decision Making  37 yo female presenting for evaluation of acute on chronic headaches.  Prior records reviewed to include neurology notes.  She has had extensive workups in the past to include MRI, LPs, etc.  Pt reports symptoms similar to prior headaches.  No red flags.  No history of trauma.  Will check basic labs and provide migraine cocktail (notes she has had benefit in the past).  Otherwise non focal exam.  Will treat symptomatically and reassess. Pt afebrile, non toxic appearing.  No meningeal signs on exam.    Work up obtained as noted above.  Symptomatically pt felt improved after migraine cocktail.  No focal neuro exam findings.  She has had sinus congestion since flu diagnosis at end of December.  Given ongoing sinus symptoms, not unreasonable to treat for suspected sinusitis.  She does have upcoming appt with ENT for re-evaluation.  She is also scheduled to see her ophthalmologist.  Will discharge with symptomatic management and outpatient follow up with her specialists.  She is comfortable with plan of care.    Reviewed symptomatic management.  OTC meds reviewed.  Anticipatory guidance.  Advised  recheck with PCP or return to ER as needed.  Strict return precautions outlined.  Patient voiced understanding and had no further questions.    Please refer to above ER course for further details/discussion.      Problems Addressed:  Chronic headache disorder: acute illness or injury  Sinusitis: acute illness or injury    Amount and/or Complexity of Data Reviewed  External Data Reviewed: labs, radiology and notes.  Labs: ordered. Decision-making details documented in ED Course.    Risk  OTC drugs.  Prescription drug management.             Disposition  Final diagnoses:   Chronic headache disorder   Sinusitis     Time reflects when diagnosis was documented in both MDM as applicable and the Disposition within this note       Time User Action Codes Description Comment    2/4/2024  8:11 PM Suzi Ho [R51.9,  G89.29] Chronic headache disorder     2/4/2024  8:11 PM Suzi Ho [J32.9] Sinusitis           ED Disposition       ED Disposition   Discharge    Condition   Stable    Date/Time   Sun Feb 4, 2024  8:11 PM    Comment   Gitadarshan Rendon discharge to home/self care.                   Follow-up Information       Follow up With Specialties Details Why Contact Info Additional Information    Norma Traore MD Family Medicine Schedule an appointment as soon as possible for a visit   [address] [facility] [address]  [phone]       [facility] Emergency Department Emergency Medicine  As needed [address] [facility] [id number] [phone] [facility] Emergency Department, [address] [place], [place], [address]            Discharge Medication List as of 2/4/2024  8:15 PM        START taking these medications    Details   clindamycin (CLEOCIN) 300 MG capsule Take 1 capsule (300 mg total) by mouth 3 (three) times a day for 7 days, Starting Sun 2/4/2024, Until Sun 2/11/2024, Normal           CONTINUE these medications which have NOT  CHANGED    Details   acetaZOLAMIDE (DIAMOX) 250 mg tablet 2 tablets by mouth twice a day, Normal      Auvelity  MG TBCR Take 1 tablet by mouth 2 (two) times a day, Starting Thu 12/21/2023, Historical Med      celecoxib (CeleBREX) 200 mg capsule take 1 capsule by mouth twice a day, Starting Sat 1/6/2024, Normal      Cequa 0.09 % SOLN Apply 1 drop to eye 2 (two) times a day, Starting Sat 8/5/2023, Normal      cholecalciferol (VITAMIN D3) 1,000 units tablet Take 5,000 Units by mouth daily, Historical Med      fluticasone (FLONASE) 50 mcg/act nasal spray 2 sprays into each nostril 2 (two) times a day, Starting Sat 8/5/2023, Normal      folic acid (FOLVITE) 1 mg tablet Take 1 mg by mouth daily, Historical Med      furosemide (LASIX) 40 mg tablet Take 1 tablet (40 mg total) by mouth if needed (take 40 mg as needed for swelling), Starting Wed 11/8/2023, Normal      gabapentin (Neurontin) 600 MG tablet Take 1 tablet (600 mg total) by mouth 2 (two) times a day, Starting Sat 8/5/2023, Normal      Galcanezumab-gnlm (Emgality) 120 MG/ML SOAJ Inject 120 mg under the skin every 30 (thirty) days, Starting Wed 6/28/2023, Normal      levothyroxine 125 mcg tablet Take 1 tablet (125 mcg total) by mouth daily in the early morning, Starting Mon 11/27/2023, Normal      linaCLOtide (Linzess) 290 MCG CAPS Take 1 capsule by mouth daily, Starting Wed 12/27/2023, Until Tue 3/26/2024, Normal      loratadine (CLARITIN) 10 mg tablet Take 1 tablet (10 mg total) by mouth daily, Starting Sat 8/5/2023, Normal      LORazepam (ATIVAN) 0.5 mg tablet Take 0.5 mg by mouth 2 (two) times a day as needed for anxiety, Historical Med      melatonin 3 mg Take 1 tablet (3 mg total) by mouth daily at bedtime, Starting Sat 8/5/2023, Normal      metFORMIN (GLUCOPHAGE) 500 mg tablet Take 1 tablet (500 mg total) by mouth daily with breakfast, Starting Sat 8/5/2023, Normal      Multiple Vitamin (MULTIVITAMIN) capsule Take 1 capsule by mouth daily, Historical  Med      omeprazole (PriLOSEC) 20 mg delayed release capsule Take 1 capsule (20 mg total) by mouth 2 (two) times a day, Starting Tue 12/12/2023, Normal      PARoxetine (PAXIL) 40 MG tablet Take 1 tablet (40 mg total) by mouth daily Do not start before August 8, 2023., Starting Tue 8/8/2023, Until Wed 11/8/2023, Normal      propranolol (INDERAL) 20 mg tablet Take 20 mg by mouth every 12 (twelve) hours, Starting Fri 1/27/2023, Historical Med      rimegepant sulfate (Nurtec) 75 mg TBDP TAKE 75MG BY MOUTH EVERY OTHER DAY. IF THERE IS A MIGRAINE ON A NON-NURTEC DAY ,YOU CAN TAKE A DOSE AND SKIP THE FOLLOWING DAY, Normal      rizatriptan (MAXALT) 10 mg tablet Take 1 tablet (10 mg total) by mouth once as needed for migraine May repeat every 2 hours if needed, max 30mg/24 hours, Starting Tue 10/24/2023, Normal      rOPINIRole (REQUIP) 4 mg tablet Take 4 mg by mouth daily at bedtime, Historical Med      Semaglutide-Weight Management (WEGOVY) 1.7 MG/0.75ML Inject 0.75 mL (1.7 mg total) under the skin once a week for 4 doses, Starting Thu 1/18/2024, Until Fri 2/9/2024, Normal      spironolactone (ALDACTONE) 50 mg tablet Take 1 tablet (50 mg total) by mouth daily, Starting Mon 11/27/2023, Normal      topiramate (TOPAMAX) 100 mg tablet Take 1.5 tablets (150 mg total) by mouth 2 (two) times a day, Starting Tue 10/3/2023, Normal      !! Vraylar 3 MG capsule Take 3 mg by mouth daily Daily in the evening, Starting Mon 1/16/2023, Historical Med      !! Vraylar 6 MG capsule Take 6 mg by mouth daily Daily in the morning, Starting Mon 6/1/2020, Historical Med      Vyvanse 70 MG capsule Take 70 mg by mouth daily, Starting Wed 8/30/2023, Historical Med       !! - Potential duplicate medications found. Please discuss with provider.          No discharge procedures on file.    PDMP Review         Value Time User    PDMP Reviewed  Yes 8/7/2023 12:35 PM LEIGHA Kaiser            ED Provider  Electronically Signed by             Suzi  SARA Ho PA-C  02/04/24 2120

## 2024-02-05 NOTE — DISCHARGE INSTRUCTIONS
Rest, plenty of fluids.  Continue your home medications as prescribed.  Take antibiotic for sinus infection.  Follow up with your PCP and neurologist or return to ER as needed.

## 2024-02-09 ENCOUNTER — OFFICE VISIT (OUTPATIENT)
Dept: OTOLARYNGOLOGY | Facility: CLINIC | Age: 39
End: 2024-02-09
Payer: COMMERCIAL

## 2024-02-09 VITALS
RESPIRATION RATE: 16 BRPM | HEIGHT: 67 IN | TEMPERATURE: 98.3 F | DIASTOLIC BLOOD PRESSURE: 80 MMHG | HEART RATE: 70 BPM | OXYGEN SATURATION: 99 % | WEIGHT: 293 LBS | SYSTOLIC BLOOD PRESSURE: 126 MMHG | BODY MASS INDEX: 45.99 KG/M2

## 2024-02-09 DIAGNOSIS — G89.29 CHRONIC NONINTRACTABLE HEADACHE, UNSPECIFIED HEADACHE TYPE: Primary | ICD-10-CM

## 2024-02-09 DIAGNOSIS — G47.33 OSA (OBSTRUCTIVE SLEEP APNEA): ICD-10-CM

## 2024-02-09 DIAGNOSIS — R51.9 CHRONIC NONINTRACTABLE HEADACHE, UNSPECIFIED HEADACHE TYPE: Primary | ICD-10-CM

## 2024-02-09 DIAGNOSIS — G43.709 CHRONIC MIGRAINE WITHOUT AURA WITHOUT STATUS MIGRAINOSUS, NOT INTRACTABLE: ICD-10-CM

## 2024-02-09 PROBLEM — J30.9 ALLERGIC RHINITIS: Status: RESOLVED | Noted: 2018-01-24 | Resolved: 2024-02-09

## 2024-02-09 PROBLEM — J32.9 RECURRENT SINUSITIS: Status: RESOLVED | Noted: 2018-11-29 | Resolved: 2024-02-09

## 2024-02-09 PROBLEM — J32.2 CHRONIC ETHMOIDAL SINUSITIS: Status: RESOLVED | Noted: 2023-03-14 | Resolved: 2024-02-09

## 2024-02-09 PROBLEM — J32.0 CHRONIC MAXILLARY SINUSITIS: Status: RESOLVED | Noted: 2023-03-14 | Resolved: 2024-02-09

## 2024-02-09 PROCEDURE — 99214 OFFICE O/P EST MOD 30 MIN: CPT | Performed by: OTOLARYNGOLOGY

## 2024-02-09 RX ORDER — ELETRIPTAN HYDROBROMIDE 40 MG/1
TABLET, FILM COATED ORAL
COMMUNITY
Start: 2024-02-03

## 2024-02-09 RX ORDER — LOTEPREDNOL ETABONATE 5 MG/ML
SUSPENSION/ DROPS OPHTHALMIC
COMMUNITY
Start: 2024-02-08

## 2024-02-09 NOTE — PROGRESS NOTES
"Otolaryngology Clinic Visit  Name:  Gita Rendon  MRN:  [id number]  Date:  2/9/2024 3:47 PM  ________________________________________________________________________       CHIEF COMPLAINT:   Chronic sinus concerns, frontal headache     HPI:  Gita Rendon is a 38 y.o. female     New patient presenting to our office with her partner for evaluation of chronic sinusitis with nasal polyposis. At the age of 15, she had endoscopic sinus surgery with nasal polypectomy. She did well postoperatively until 2018/2019 when she started developing more frequent headaches and sinus pressure. Imaging was performed at that time, identifying opacified ethmoid sinuses. An in-office procedure was recommended, but not pursued because she moved to a different area.     She had a \"bad sinus infection\" after testing positive for COVID in 10/2022. Since then, she has been experiencing intermittent malodorous nasal drainage, copious PND, intermittent nasal congestion, dry nasal mucosa. She also has been having constant headaches, experiencing pain around the eyes and back of the head. Neurology managing migraine and benign intracranial hypertension. She uses the navage a few times per week for years. Was using Flonase from 2018 to 2019, but is not fond of the taste.     2 weeks ago, she was evaluated at the dentist after developing dental and facial pain, facial swelling, foul-smelling drainage. She was placed on a 10-day course of clindamycin and was provided a refill just in case symptoms persisted. Extraction was recommended. She states that the antibiotic helped with the drainage, but will intermittently  on a foul smell in her nose.     INTERVAL HISTORY  Pt saw [facility] and is being treated for IHH. Still has significant rhinorrhea. Had 1 infection per patient requiring abx. Is not interested in nasal rinse.     PMHx:  Past Medical History:   Diagnosis Date    Allergic     Allergic rhinitis     Anorexia nervosa in " remission     Anxiety     Arthritis 5/8/2014    Back pain     Bipolar disorder (HCC)     Depression     Dermatitis 10/16/2021    Disease of thyroid gland     Diverticulitis of colon 9/20/2015    Dizziness     Ear problems     Eating disorder     Esophageal reflux 08/15/2013    GERD (gastroesophageal reflux disease) 8/15/2013    Headache(784.0)     Headache, tension-type     Hypertension     Hypothyroid     Idiopathic intracranial hypertension     Lumbar degenerative disc disease 05/08/2014    Migraine     Nasal congestion     Nosebleed     Obesity     Obsessive-compulsive disorder     Otitis media     Overactive bladder     Psychiatric disorder     Restless leg syndrome, controlled     Seasonal allergies     Sinusitis     Sleep apnea     Sleep difficulties     Suicide and self-inflicted injury (HCC)     Tinnitus     TMJ dysfunction     Tonsillitis     Transcranial Magnetic Stimulation 09/06/2021    Urinary tract infection     Vision loss        PSHx:  Past Surgical History:   Procedure Laterality Date    DENTAL SURGERY      wisdom teeth-at [age] years. Other surgery dental extraxtion of bone spur (10 years ago)    IR LUMBAR PUNCTURE  02/26/2019    SINUS ENDOSCOPY      SINUS SURGERY      TONSILLECTOMY         FAMHx:  Family History   Problem Relation Age of Onset    Mental illness Mother     Depression Mother     Suicide Attempts Mother     Hypertension Mother     Diabetes Mother     Other Mother         bicuspid aortic valve    Anxiety disorder Mother     Psychiatric Illness Mother     Schizoaffective Disorder  Mother     Anemia Mother     Hypertension Father     Hypothyroidism Father     Thyroid disease Father     Hypertension Brother     Cancer Maternal Grandmother     Heart disease Maternal Grandmother     Stroke Maternal Grandmother     Breast cancer Maternal Grandmother     Skin cancer Maternal Grandmother     Arthritis Maternal Grandmother     Pneumonia Maternal Grandfather     Cancer Maternal Grandfather      Dementia Maternal Grandfather     COPD Maternal Grandfather     Cancer Paternal Grandmother     Pneumonia Paternal Grandfather     Arthritis Family     Breast cancer Family     Osteopenia Family     Osteoporosis Family     Hypertension Family     Transient ischemic attack Family        SOCHx:  Social History     Socioeconomic History    Marital status: Single     Spouse name: None    Number of children: None    Years of education: None    Highest education level: None   Occupational History    Occupation: DISABLED   Tobacco Use    Smoking status: Never     Passive exposure: Never    Smokeless tobacco: Never   Vaping Use    Vaping status: Never Used   Substance and Sexual Activity    Alcohol use: Never    Drug use: Never    Sexual activity: Yes     Partners: Female     Comment: No STDs   Other Topics Concern    None   Social History Narrative    Always uses seat belts    Caffeine use     Disabled - permament disability since 2008 due to psychiatric illness    Has no children    Single     Tea     Social Determinants of Health     Financial Resource Strain: Medium Risk (3/15/2023)    Overall Financial Resource Strain (CARDIA)     Difficulty of Paying Living Expenses: Somewhat hard   Food Insecurity: No Food Insecurity (10/30/2023)    Received from [facility]    Hunger Vital Sign     Worried About Running Out of Food in the Last Year: Never true     Ran Out of Food in the Last Year: Never true   Transportation Needs: No Transportation Needs (3/15/2023)    PRAPARE - Transportation     Lack of Transportation (Medical): No     Lack of Transportation (Non-Medical): No   Physical Activity: Not on file   Stress: Not on file   Social Connections: Not on file   Intimate Partner Violence: Not on file   Housing Stability: Not on file       Allergies:  Allergies   Allergen Reactions    Doxycycline      Pt refuses d/t remote H/O intracranial hypertension     Other Other (See Comments)     Seasonal allergies     "    MEDS:  Reviewed    ROS:  As above       PHYSICAL EXAM:  /80   Pulse 70   Temp 98.3 °F (36.8 °C)   Resp 16   Ht 5' 7\" (1.702 m)   Wt (!) 157 kg (346 lb 6.4 oz)   SpO2 99%   BMI 54.25 kg/m²   General: NAD, AOx4  Eyes:  EOMI  Ears:  Right: ear canal normal, TM normal apperance, no fluid  Left: ear canal normal, TM normal apperance, no fluid  Nose:  No septal deviation, no inferior turbinate hypertrophy, no mass/lesions, see scope exam for details  Oral cavity:  No trismus, no mass/lesions, tonsils 1+  Neck: Trachea is midline; no thyroid nodules, Salivary glands symmetrical, no masses/abnormality on palpation  Lymph:  No cervical lymphadenopathy  Skin:  No obvious facial lesions  Neuro:  Face symmetrical, no obvious cranial nerve palsy. No focal deficits   Lungs:  Normal work of breathing, symmetrical chest expansion  Vascular: Well perfused    Procedures:  SINUS ENDOSCOPY:  Procedure: Sinus endoscopy    Diagnosis: Nasal obstruction   : Nena    The risks, benefits and alternatives were discussed. The patient voiced their understanding. They wished to proceed and an informed consent was obtained. The patient's nose was topically decongested and anesthetized using Lidocaine and oxymetazoline. The fiberoptic endoscope was inserted via both sinus cavities. Septum was found to be straight.  The inferior turbinates were mildly enlarged. Green crusting seen throughout    Right:   The middle turbinate was unremarkable but has mild crusting.  The nasopharynx showed no significant lesions or purulence.  Frontal recess showed unremarkable Sphenoethmoidal recess showed unremarkable.  Maxillary sinus ostia showed no polyp or purulence.      Left:   The middle turbinate was unremarkable but has mild crusting.  The nasopharynx showed no significant lesions or purulence.  Frontal recess showed unremarkable Sphenoethmoidal recess showed unremarkable.  Maxillary sinus ostia showed no polyp or purulence. "     Patient tolerated the procedure well with no complications.       Medical Data Reviewed:  Records reviewed and summarized as in EPIC    Radiology:  None    Labs:  N/A     Patient Active Problem List   Diagnosis    Obsessive-compulsive disorder    Hypertension    Hypothyroidism    Overactive bladder    Allergic rhinitis    Binge-eating disorder, severe    Gambling disorder, moderate    Routine screening for STI (sexually transmitted infection)    BMI 50.0-59.9, adult ([facility])    Dysfunction of both eustachian tubes    Bulging lumbar disc    Esophageal reflux    EILEEN (generalized anxiety disorder)    Lumbar degenerative disc disease    Morbid obesity (HCC)    Nocturnal enuresis    PTSD (post-traumatic stress disorder)    Urgency incontinence    Vitamin D deficiency    Chronic midline low back pain without sciatica    Iliotibial band syndrome of left side    Piriformis syndrome of left side    Skin lesion    Recurrent sinusitis    IFG (impaired fasting glucose)    Family history of cancer    Intracranial hypertension    Chronic migraine without aura without status migrainosus, not intractable    Chronic tension-type headache, not intractable    Menorrhagia with irregular cycle    Encounter for insertion of mirena IUD    CAIO (obstructive sleep apnea)    IUD check up    Class 3 severe obesity with body mass index (BMI) of 60.0 to 69.9 in adult ([facility])    Cervicalgia    Numbness and tingling in both hands    Dermatitis    Chronic constipation    Thyroid disease neuropathy ([facility])    Encounter for medication monitoring    Hypervolemia    Migraine headache    Hyperaldosteronism ([facility])    Persistent proteinuria    Chronic ethmoidal sinusitis    Chronic maxillary sinusitis    Depression    Xerophthalmia    Xerostomia    Bilateral lower extremity edema       ASSESSMENT/PLAN:  Gita Rendon is a 38 y.o. female with acute and chronic problems as above who presents with:    1. Chronic nonintractable headache, unspecified headache  type    2. CAIO (obstructive sleep apnea)    3. Chronic migraine without aura without status migrainosus, not intractable          38 y.o. here today for further evaluation of chronic nasal irritation and headache. No interest in topical nasal treatment or rinse. Discussed CT sinus showed no acute or chronic inflammation. Did agree to use Atrovent for rhinorrhea and d/c if patient cannot tolerate. Recommended follow-up with Neuro due to likely intracranial cause of headaches. Patient agrees.     RTC IRMA Jordan, PGY2  Otolaryngology- Head and Neck Surgery  Please contact Ariel Ritter ENT resident role    2/9/2024 3:47 PM

## 2024-02-14 ENCOUNTER — TELEPHONE (OUTPATIENT)
Dept: NEUROLOGY | Facility: CLINIC | Age: 39
End: 2024-02-14

## 2024-02-14 NOTE — TELEPHONE ENCOUNTER
2/13 at 8:56 am:    Hi my name is Gita Rendon. My YOB: 1985. The reason I am calling is I was seeing Dr. Cueva and I had been referred to [facility] for my headaches, migraines and some other issues I was having. [facility], I guess, is the facility that I am going to see from now on. I had made you guys aware of that, but I have to have my medications transferred to [facility]. They are going to be the ones that will be filling them. So I was just wondering exactly how I go about having that done. I don't know if they have to pull them or if it's something you guys do on your end. I was little confused about that. I thought maybe I'd be able to speak to somebody, but I guess you guys have to call me back. If you can do that, I would really appreciate it. My phone number is [phone].

## 2024-02-15 DIAGNOSIS — E66.01 MORBID OBESITY (HCC): Primary | ICD-10-CM

## 2024-02-15 NOTE — TELEPHONE ENCOUNTER
I spoke to patient to clarify what is needed.  She said she transferred her neurology care from [facility] to [facility]; discussed she would call [facility] when she needs a refill and they would send prescription however if anything further is needed; please let us know.  She will call [facility] to clarify and cb if needed.  I also let her know [facility] most likely can see her records from [facility] through care everywhere.

## 2024-02-21 ENCOUNTER — OFFICE VISIT (OUTPATIENT)
Dept: FAMILY MEDICINE CLINIC | Facility: CLINIC | Age: 39
End: 2024-02-21
Payer: COMMERCIAL

## 2024-02-21 VITALS
DIASTOLIC BLOOD PRESSURE: 68 MMHG | HEART RATE: 67 BPM | BODY MASS INDEX: 45.99 KG/M2 | TEMPERATURE: 97.2 F | SYSTOLIC BLOOD PRESSURE: 112 MMHG | HEIGHT: 67 IN | WEIGHT: 293 LBS | OXYGEN SATURATION: 99 %

## 2024-02-21 DIAGNOSIS — G25.81 RESTLESS LEG SYNDROME: Primary | ICD-10-CM

## 2024-02-21 PROBLEM — Z11.3 ROUTINE SCREENING FOR STI (SEXUALLY TRANSMITTED INFECTION): Status: RESOLVED | Noted: 2018-02-26 | Resolved: 2024-02-21

## 2024-02-21 PROCEDURE — 99214 OFFICE O/P EST MOD 30 MIN: CPT | Performed by: FAMILY MEDICINE

## 2024-02-21 RX ORDER — VORTIOXETINE 5 MG/1
5 TABLET, FILM COATED ORAL DAILY
COMMUNITY
Start: 2024-02-19

## 2024-02-21 RX ORDER — ROPINIROLE 2 MG/1
2 TABLET, FILM COATED ORAL
Qty: 30 TABLET | Refills: 0 | Status: SHIPPED | OUTPATIENT
Start: 2024-02-21 | End: 2024-03-22

## 2024-02-21 RX ORDER — ERENUMAB-AOOE 140 MG/ML
140 INJECTION, SOLUTION SUBCUTANEOUS
COMMUNITY

## 2024-02-21 NOTE — PROGRESS NOTES
Name: Gita Rendon      : 1985      MRN: [id number]  Encounter Provider: Jere Dillon MD  Encounter Date: 2024   Encounter department: [facility] PRIMARY CARE    Assessment & Plan     1. Restless leg syndrome  Comments:  Due to link with gambling addiction, will taper down Requip and contact Dr. Cueva (Neuro) for further recs. Will do 50% of current dose: 2 mg x 2 weeks to see if pt can tolerate and discontinue if appropriate.  Orders:  -     rOPINIRole (REQUIP) 2 mg tablet; Take 1 tablet (2 mg total) by mouth daily at bedtime Take 2 mg for 2 weeks and if able to tolerate, can decrease slowly thereafter.           Subjective      Pt presents to the clinic with questions regarding her Requip for RLS. Patient has a history of gambling addiction and has started scratching lottery tickets 2 years ago and last scratch off was . Spends anywhere from $0 - $80 at a time. Did overcome gambling addiction few years ago with the help of therapist. Her psychiatrist and therapist have recommended stopping Requip as it has shown to have a link with gambling addiction.   Sees Psych every month via [facility] in [place] Dr. [place]on, sometimes every 2 weeks. Sees therapist Stefani Coates via [facility] in [place] every week.    Sees Neurosurgery and Neurology via [facility] for idiopathic intracranial HTN. Next appt is in April    Previously for RLS, was on combo gapapentin/Requip 1200 mg / day, now down to 600 mg/day of Gabapentin. Requip was tapered down from 4mg BID to QD previously.       Review of Systems   Constitutional:  Negative for fever.   Respiratory:  Negative for shortness of breath and wheezing.    Cardiovascular:  Negative for chest pain.   Gastrointestinal:  Negative for nausea and vomiting.       Current Outpatient Medications on File Prior to Visit   Medication Sig    acetaZOLAMIDE (DIAMOX) 250 mg tablet 2 tablets by mouth twice a day    cariprazine (Vraylar) 3 MG capsule Take 3 mg  by mouth daily at bedtime    celecoxib (CeleBREX) 200 mg capsule take 1 capsule by mouth twice a day    Cequa 0.09 % SOLN Apply 1 drop to eye 2 (two) times a day    cholecalciferol (VITAMIN D3) 1,000 units tablet Take 5,000 Units by mouth daily    Erenumab-aooe (Aimovig) 140 MG/ML SOAJ Inject 140 mg under the skin every 28 days    folic acid (FOLVITE) 1 mg tablet Take 1 mg by mouth daily    furosemide (LASIX) 40 mg tablet Take 1 tablet (40 mg total) by mouth if needed (take 40 mg as needed for swelling)    levothyroxine 125 mcg tablet Take 1 tablet (125 mcg total) by mouth daily in the early morning    linaCLOtide (Linzess) 290 MCG CAPS Take 1 capsule by mouth daily    loratadine (CLARITIN) 10 mg tablet Take 1 tablet (10 mg total) by mouth daily    LORazepam (ATIVAN) 0.5 mg tablet Take 0.5 mg by mouth 2 (two) times a day as needed for anxiety    metFORMIN (GLUCOPHAGE) 500 mg tablet Take 1 tablet (500 mg total) by mouth daily with breakfast    Multiple Vitamin (MULTIVITAMIN) capsule Take 1 capsule by mouth daily    omeprazole (PriLOSEC) 20 mg delayed release capsule Take 1 capsule (20 mg total) by mouth 2 (two) times a day    propranolol (INDERAL) 20 mg tablet Take 20 mg by mouth every 12 (twelve) hours    rimegepant sulfate (Nurtec) 75 mg TBDP TAKE 75MG BY MOUTH EVERY OTHER DAY. IF THERE IS A MIGRAINE ON A NON-NURTEC DAY ,YOU CAN TAKE A DOSE AND SKIP THE FOLLOWING DAY    rizatriptan (MAXALT) 10 mg tablet Take 1 tablet (10 mg total) by mouth once as needed for migraine May repeat every 2 hours if needed, max 30mg/24 hours    Semaglutide-Weight Management (WEGOVY) 1.7 MG/0.75ML Inject 0.75 mL (1.7 mg total) under the skin once a week for 4 doses    spironolactone (ALDACTONE) 50 mg tablet Take 1 tablet (50 mg total) by mouth daily    topiramate (TOPAMAX) 100 mg tablet Take 1.5 tablets (150 mg total) by mouth 2 (two) times a day    Trintellix 5 MG tablet Take 5 mg by mouth daily    Vraylar 6 MG capsule Take 6 mg by  "mouth daily Daily in the morning    Vyvanse 70 MG capsule Take 70 mg by mouth daily    Auvelity  MG TBCR Take 1 tablet by mouth 2 (two) times a day (Patient not taking: Reported on 2/21/2024)    eletriptan (RELPAX) 40 MG tablet take 1 tablet by mouth ONCE AS NEEDED FOR MIGRAINE. MAY REPEAT ONE TIME AFTER 2 HOURS AS NEEDED    loteprednol etabonate (LOTEMAX) 0.5 % ophthalmic suspension  (Patient not taking: Reported on 2/21/2024)       Objective     /68   Pulse 67   Temp (!) 97.2 °F (36.2 °C) (Tympanic)   Ht 5' 7\" (1.702 m)   Wt (!) 155 kg (342 lb 3.2 oz)   SpO2 99%   BMI 53.60 kg/m²     Physical Exam  Vitals reviewed.   Constitutional:       Appearance: She is obese.   Eyes:      General: No scleral icterus.     Extraocular Movements: Extraocular movements intact.   Cardiovascular:      Rate and Rhythm: Normal rate and regular rhythm.      Heart sounds: Normal heart sounds. No murmur heard.  Pulmonary:      Effort: Pulmonary effort is normal.      Breath sounds: Normal breath sounds. No wheezing.   Abdominal:      Palpations: Abdomen is soft.      Tenderness: There is no abdominal tenderness.   Skin:     Coloration: Skin is not jaundiced.   Neurological:      Gait: Gait normal.      Comments: No resting leg tremors, jerking movements or restlessness   Psychiatric:         Mood and Affect: Mood normal.         Behavior: Behavior normal.       Jere Dillon MD    "

## 2024-02-29 ENCOUNTER — APPOINTMENT (OUTPATIENT)
Dept: LAB | Facility: CLINIC | Age: 39
End: 2024-02-29
Payer: COMMERCIAL

## 2024-02-29 ENCOUNTER — OFFICE VISIT (OUTPATIENT)
Dept: LAB | Facility: CLINIC | Age: 39
End: 2024-02-29
Payer: COMMERCIAL

## 2024-02-29 DIAGNOSIS — Z01.810 PRE-OPERATIVE CARDIOVASCULAR EXAMINATION: ICD-10-CM

## 2024-02-29 DIAGNOSIS — Z01.818 OTHER SPECIFIED PRE-OPERATIVE EXAMINATION: ICD-10-CM

## 2024-02-29 LAB
ANION GAP SERPL CALCULATED.3IONS-SCNC: 8 MMOL/L
APTT PPP: 29 SECONDS (ref 23–37)
BASOPHILS # BLD AUTO: 0.08 THOUSANDS/ÂΜL (ref 0–0.1)
BASOPHILS NFR BLD AUTO: 1 % (ref 0–1)
BUN SERPL-MCNC: 22 MG/DL (ref 5–25)
CALCIUM SERPL-MCNC: 9.1 MG/DL (ref 8.4–10.2)
CHLORIDE SERPL-SCNC: 113 MMOL/L (ref 96–108)
CO2 SERPL-SCNC: 19 MMOL/L (ref 21–32)
CREAT SERPL-MCNC: 0.74 MG/DL (ref 0.6–1.3)
EOSINOPHIL # BLD AUTO: 0.34 THOUSAND/ÂΜL (ref 0–0.61)
EOSINOPHIL NFR BLD AUTO: 4 % (ref 0–6)
ERYTHROCYTE [DISTWIDTH] IN BLOOD BY AUTOMATED COUNT: 13.7 % (ref 11.6–15.1)
GFR SERPL CREATININE-BSD FRML MDRD: 102 ML/MIN/1.73SQ M
GLUCOSE P FAST SERPL-MCNC: 107 MG/DL (ref 65–99)
HCT VFR BLD AUTO: 45.3 % (ref 34.8–46.1)
HGB BLD-MCNC: 14.3 G/DL (ref 11.5–15.4)
IMM GRANULOCYTES # BLD AUTO: 0.05 THOUSAND/UL (ref 0–0.2)
IMM GRANULOCYTES NFR BLD AUTO: 1 % (ref 0–2)
INR PPP: 1.14 (ref 0.84–1.19)
LYMPHOCYTES # BLD AUTO: 2.53 THOUSANDS/ÂΜL (ref 0.6–4.47)
LYMPHOCYTES NFR BLD AUTO: 26 % (ref 14–44)
MCH RBC QN AUTO: 28.3 PG (ref 26.8–34.3)
MCHC RBC AUTO-ENTMCNC: 31.6 G/DL (ref 31.4–37.4)
MCV RBC AUTO: 90 FL (ref 82–98)
MONOCYTES # BLD AUTO: 0.69 THOUSAND/ÂΜL (ref 0.17–1.22)
MONOCYTES NFR BLD AUTO: 7 % (ref 4–12)
NEUTROPHILS # BLD AUTO: 5.93 THOUSANDS/ÂΜL (ref 1.85–7.62)
NEUTS SEG NFR BLD AUTO: 61 % (ref 43–75)
NRBC BLD AUTO-RTO: 0 /100 WBCS
PLATELET # BLD AUTO: 345 THOUSANDS/UL (ref 149–390)
PMV BLD AUTO: 10.7 FL (ref 8.9–12.7)
POTASSIUM SERPL-SCNC: 4 MMOL/L (ref 3.5–5.3)
PROTHROMBIN TIME: 14.4 SECONDS (ref 11.6–14.5)
RBC # BLD AUTO: 5.06 MILLION/UL (ref 3.81–5.12)
SODIUM SERPL-SCNC: 140 MMOL/L (ref 135–147)
WBC # BLD AUTO: 9.62 THOUSAND/UL (ref 4.31–10.16)

## 2024-02-29 PROCEDURE — 85610 PROTHROMBIN TIME: CPT

## 2024-02-29 PROCEDURE — 36415 COLL VENOUS BLD VENIPUNCTURE: CPT

## 2024-02-29 PROCEDURE — 93005 ELECTROCARDIOGRAM TRACING: CPT

## 2024-02-29 PROCEDURE — 85730 THROMBOPLASTIN TIME PARTIAL: CPT

## 2024-02-29 PROCEDURE — 80048 BASIC METABOLIC PNL TOTAL CA: CPT

## 2024-02-29 PROCEDURE — 85025 COMPLETE CBC W/AUTO DIFF WBC: CPT

## 2024-03-01 LAB
ATRIAL RATE: 58 BPM
P AXIS: 2 DEGREES
PR INTERVAL: 170 MS
QRS AXIS: -13 DEGREES
QRSD INTERVAL: 92 MS
QT INTERVAL: 396 MS
QTC INTERVAL: 388 MS
T WAVE AXIS: 9 DEGREES
VENTRICULAR RATE: 58 BPM

## 2024-03-01 PROCEDURE — 93010 ELECTROCARDIOGRAM REPORT: CPT | Performed by: INTERNAL MEDICINE

## 2024-03-02 DIAGNOSIS — K21.9 GASTROESOPHAGEAL REFLUX DISEASE WITHOUT ESOPHAGITIS: ICD-10-CM

## 2024-03-04 RX ORDER — OMEPRAZOLE 20 MG/1
20 CAPSULE, DELAYED RELEASE ORAL 2 TIMES DAILY
Qty: 180 CAPSULE | Refills: 0 | Status: SHIPPED | OUTPATIENT
Start: 2024-03-04

## 2024-03-05 DIAGNOSIS — R73.01 IFG (IMPAIRED FASTING GLUCOSE): ICD-10-CM

## 2024-03-05 DIAGNOSIS — F50.81 BINGE-EATING DISORDER, SEVERE: ICD-10-CM

## 2024-03-05 DIAGNOSIS — R63.5 ABNORMAL WEIGHT GAIN: ICD-10-CM

## 2024-03-05 DIAGNOSIS — E66.01 MORBID (SEVERE) OBESITY DUE TO EXCESS CALORIES (HCC): ICD-10-CM

## 2024-03-12 DIAGNOSIS — E66.01 CLASS 3 SEVERE OBESITY WITH BODY MASS INDEX (BMI) OF 60.0 TO 69.9 IN ADULT (HCC): Primary | ICD-10-CM

## 2024-03-12 DIAGNOSIS — E66.01 CLASS 3 SEVERE OBESITY WITH BODY MASS INDEX (BMI) OF 60.0 TO 69.9 IN ADULT (HCC): ICD-10-CM

## 2024-03-13 ENCOUNTER — NURSE TRIAGE (OUTPATIENT)
Dept: BARIATRICS | Facility: CLINIC | Age: 39
End: 2024-03-13

## 2024-03-13 ENCOUNTER — RA CDI HCC (OUTPATIENT)
Dept: OTHER | Facility: HOSPITAL | Age: 39
End: 2024-03-13

## 2024-03-14 DIAGNOSIS — G25.81 RESTLESS LEG SYNDROME: ICD-10-CM

## 2024-03-14 NOTE — TELEPHONE ENCOUNTER
PA for wegovy    Submitted via    [x]CMM-KEY [id number]  [][employer]-Case ID #    []Faxed to plan   []Other website    []Phone call Case ID #      Office notes sent, clinical questions answered. Awaiting determination    Turnaround time for your insurance to make a decision on your Prior Authorization can take 7-21 business days.

## 2024-03-14 NOTE — TELEPHONE ENCOUNTER
It is lucho verduzco patient. Can you see if she would be willing to try to either go back on ozempic and do  1mg or go up until 2mg of ozempic?insurance saying wegovy is not covered or any weight loss meds

## 2024-03-14 NOTE — TELEPHONE ENCOUNTER
"Mercedes from [facility] called in to notify provider that they received PA for Wegovy. Pt's insurance does not cover any weight loss medications. There are no alternatives. Will fax denial letter to office.    Answer Assessment - Initial Assessment Questions  1. REASON FOR CALL or QUESTION: \"What is your reason for calling today?\" or \"How can I best help you?\" or \"What question do you have that I can help answer?\"      PA determination    Protocols used: Information Only Call - No Triage-ADULT-[place]    "

## 2024-03-14 NOTE — TELEPHONE ENCOUNTER
Patient was called to informed her of the providers message. Patient stated that weight loss medication goes through her [employer] insurance. Patient also stated that her pharmacy called her to informed that her prescription is ready for .

## 2024-03-15 ENCOUNTER — OFFICE VISIT (OUTPATIENT)
Dept: NEPHROLOGY | Facility: CLINIC | Age: 39
End: 2024-03-15
Payer: COMMERCIAL

## 2024-03-15 VITALS
DIASTOLIC BLOOD PRESSURE: 76 MMHG | WEIGHT: 293 LBS | SYSTOLIC BLOOD PRESSURE: 128 MMHG | BODY MASS INDEX: 45.99 KG/M2 | HEART RATE: 62 BPM | OXYGEN SATURATION: 99 % | HEIGHT: 67 IN

## 2024-03-15 DIAGNOSIS — I10 HYPERTENSION, UNSPECIFIED TYPE: ICD-10-CM

## 2024-03-15 DIAGNOSIS — E87.20 METABOLIC ACIDOSIS: ICD-10-CM

## 2024-03-15 DIAGNOSIS — E26.9 HYPERALDOSTERONISM (HCC): ICD-10-CM

## 2024-03-15 DIAGNOSIS — G93.2 IDIOPATHIC INTRACRANIAL HYPERTENSION: Primary | ICD-10-CM

## 2024-03-15 PROCEDURE — 99214 OFFICE O/P EST MOD 30 MIN: CPT | Performed by: INTERNAL MEDICINE

## 2024-03-15 RX ORDER — SPIRONOLACTONE 25 MG/1
25 TABLET ORAL DAILY
Qty: 90 TABLET | Refills: 1 | Status: SHIPPED | OUTPATIENT
Start: 2024-03-15

## 2024-03-15 RX ORDER — GABAPENTIN 300 MG/1
300 CAPSULE ORAL 2 TIMES DAILY
COMMUNITY

## 2024-03-15 NOTE — PROGRESS NOTES
Assessment & Plan:    1. Idiopathic intracranial hypertension    2. Hypertension, unspecified type  -     spironolactone (ALDACTONE) 25 mg tablet; Take 1 tablet (25 mg total) by mouth daily  -     Basic metabolic panel; Future; Expected date: 04/17/2024    3. Hyperaldosteronism (HCC)  -     Basic metabolic panel; Future; Expected date: 04/17/2024    4. Metabolic acidosis         1.  Idiopathic intracranial hypertension  Patient is currently managed on Diamox.  She has stopped Lasix.  She did have a recent procedure of her [place] with lumbar puncture and manometry.  She does not report needing any intervention with stenting.  I do not of the full details regarding this procedure result.  She should continue to follow with [place] neurology/neurosurgery as indicated.  As she appears to be tolerating this medication aside from her metabolic acidosis which is mild, can continue this medication for now.  If her metabolic acidosis should worsen then we may have to reconsider this medication.  She should have monthly chemistries to follow-up for monitoring.  I have discussed the side effect profile of this medicine and how non-anion gap metabolic acidosis works and the Diamox causes bicarbonate wasting.  She understands this and would like to continue with the medication for now.  She understands that she cannot become pregnant on this medication.    2.  Hypertension unspecified  Her blood pressure appears very well-controlled at present.  She is maintained on spironolactone 50 mg/day and this was reduced to 25 mg/day at this visit due to concerns for overly well-controlled blood pressure with potential symptomatic hypotension.  At this dose, this is not a very potent diuretic and would usually needs higher doses.  Hopefully with this reduction this does not affect her intracranial hypertension in any way.  She understands that if any symptoms worsen that we may have to revisit this topic.  She is agreeable  and would like to reduce the spironolactone for now.  Supposedly, this medication is not being utilized so much for the intracranial hypertension but rather her hyperaldosteronism state.    3.  Hyperaldosteronism  She is currently on spironolactone 50 mg/day, she is interested in reducing the dose that she is concerned about hypotension which is symptomatic for her.  She typically has blood pressure in the low 100 range and can frequently get dizzy.  Due to this concern, we have decided to reduce the spironolactone dose to 25 mg/day.  It is unclear if this is being helpful in the treatment of her intracranial hypertension of which, she seems to be managed on Diamox for this.  She understands the risk benefit ratio of reducing this medication.  Will repeat chemistry in 1 month.  She should continue to follow her blood pressure trends at home.    4.  Metabolic acidosis, non-anion gap, mild  Her T CO2 trends have been in the 19 range.  If her trends would decline, would consider potentially discontinuing this medication or considering sodium bicarbonate therapy however, would not want to worsen intravascular volume status and create further hypertension/volume overload.  Will continue to monitor with monthly chemistries.    The benefits, risks and alternatives to the treatment plan were discussed at this visit. Patient was advised of common adverse effects of any medical therapies prescribed. All questions were answered and discussed with the patient and any accompanying family members or caretakers.      Subjective:      Patient ID: Gita Rendon is a 38 y.o. female presents for follow-up in the [place] office.  She is being followed for hypertension and diuretic management.  She has a history of idiopathic intracranial hypertension currently utilizing Diamox.    HPI  In the interim since her last visit, she had a lumbar puncture at [facility] and the opening pressure was 14.  She has switched to [facility]  "neurology for ongoing management of her idiopathic intracranial hypertension.  She was referred to neurosurgery as well for further testing that may be indicated given her condition.  On 3/11 she had a preoperative evaluation for anticipation of angiogram and venous manometry.  She reports not needing said angiogram procedure as her testing was normal.  Cannot find the documentation regarding the details of this result.    She follows with bariatrics for weight management and takes Wegovy.  She has lost significant weight on this medication.    She had been on Diamox previously and this caused severe dry mouth with her other medication and she was on lasix.   Neurology had recommended retrialing Diamox and this does seem to have been improved.    Since Diamox restarting she has had a non-anion gap metabolic acidosis which can be expected.  She is not on bicarbonate therapy.  She tolerates this well without any side effects.  She does not have the dry mouth side effect.    Patient has been on spironolactone longstanding for hyperaldosteronism.  She is worried about needing this medication still.  She does report dizziness at times and concerns for low blood pressure.  Although her blood pressure is well-controlled for today, she did frequently gets systolics of 100.    The following portions of the patient's history were reviewed and updated as appropriate: allergies, current medications, past family history, past medical history, past social history, past surgical history, and problem list.    Review of Systems   Gastrointestinal:  Positive for nausea.         Objective:      /76 (BP Location: Left arm, Patient Position: Sitting)   Pulse 62   Ht 5' 7\" (1.702 m)   Wt (!) 153 kg (338 lb 6.4 oz)   SpO2 99%   BMI 53.00 kg/m²          Physical Exam  Vitals reviewed.   Constitutional:       General: She is not in acute distress.  HENT:      Head: Normocephalic and atraumatic.      Nose: Nose normal.      " Mouth/Throat:      Mouth: Mucous membranes are moist.   Eyes:      Extraocular Movements: Extraocular movements intact.      Conjunctiva/sclera: Conjunctivae normal.   Cardiovascular:      Rate and Rhythm: Normal rate and regular rhythm.      Heart sounds:      No friction rub.   Pulmonary:      Breath sounds: No wheezing, rhonchi or rales.   Abdominal:      Palpations: Abdomen is soft.      Tenderness: There is no abdominal tenderness. There is no guarding.   Musculoskeletal:      Right lower leg: Edema present.      Left lower leg: Edema present.   Skin:     General: Skin is warm.      Coloration: Skin is not jaundiced.      Findings: No bruising.   Neurological:      General: No focal deficit present.      Mental Status: She is alert and oriented to person, place, and time. Mental status is at baseline.   Psychiatric:         Mood and Affect: Mood normal.         Behavior: Behavior normal.             Lab Results   Component Value Date     10/20/2015    SODIUM 140 02/29/2024    K 4.0 02/29/2024     (H) 02/29/2024    CO2 19 (L) 02/29/2024    ANIONGAP 10 10/20/2015    AGAP 8 02/29/2024    BUN 22 02/29/2024    CREATININE 0.74 02/29/2024    GLUC 76 02/04/2024    GLUF 107 (H) 02/29/2024    CALCIUM 9.1 02/29/2024    AST 14 02/04/2024    ALT 15 02/04/2024    ALKPHOS 70 02/04/2024    PROT 7.5 10/20/2015    TP 7.0 02/04/2024    BILITOT 0.41 10/20/2015    TBILI 0.30 02/04/2024    EGFR 102 02/29/2024      Lab Results   Component Value Date    CREATININE 0.74 02/29/2024    CREATININE 0.86 02/04/2024    CREATININE 0.86 01/05/2024    CREATININE 0.89 12/31/2023    CREATININE 0.80 12/29/2023    CREATININE 0.69 12/11/2023    CREATININE 0.77 11/30/2023    CREATININE 0.71 09/23/2023    CREATININE 0.76 08/04/2023    CREATININE 0.85 08/02/2023    CREATININE 0.76 04/12/2023    CREATININE 0.76 12/30/2022    CREATININE 0.88 10/28/2022    CREATININE 0.81 09/14/2022    CREATININE 0.84 07/23/2022      Lab Results   Component  "Value Date    COLORU Light Yellow 10/04/2023    CLARITYU Clear 10/04/2023    SPECGRAV 1.015 10/04/2023    PHUR 5.5 10/04/2023    LEUKOCYTESUR Trace (A) 10/04/2023    NITRITE Negative 10/04/2023    PROTEIN UA Negative 10/04/2023    GLUCOSEU Negative 10/04/2023    KETONESU Negative 10/04/2023    UROBILINOGEN <2.0 10/04/2023    BILIRUBINUR Negative 10/04/2023    BLOODU Negative 10/04/2023    RBCUA None Seen 10/04/2023    WBCUA 1-2 10/04/2023    EPIS Occasional 10/04/2023    BACTERIA Occasional 10/04/2023      No results found for: \"LABPROT\"  No results found for: \"MICROALBUR\", \"KVEF70SNX\"  Lab Results   Component Value Date    WBC 9.62 02/29/2024    HGB 14.3 02/29/2024    HCT 45.3 02/29/2024    MCV 90 02/29/2024     02/29/2024      Lab Results   Component Value Date    HGB 14.3 02/29/2024    HGB 14.2 02/04/2024    HGB 13.6 12/31/2023    HGB 14.0 11/30/2023    HGB 13.9 09/23/2023      Lab Results   Component Value Date    IRON 48 (L) 02/24/2015      No results found for: \"PTHCALCIUM\", \"[id number]\", \"PHOSPHORUS\"   Lab Results   Component Value Date    CHOLESTEROL 185 08/04/2023    HDL 53 08/04/2023    LDLCALC 113 (H) 08/04/2023    TRIG 95 08/04/2023      Lab Results   Component Value Date    URICACID 8.2 (H) 11/13/2021      Lab Results   Component Value Date    HGBA1C 6.1 (H) 08/04/2023      Lab Results   Component Value Date    H5YMKSI 0.90 01/09/2018    FREET4 0.87 09/14/2022      Lab Results   Component Value Date    JANE Negative 09/23/2023      Lab Results   Component Value Date    PROT 7.5 10/20/2015        Portions of the record may have been created with voice recognition software. Occasional wrong word or \"sound a like\" substitutions may have occurred due to the inherent limitations of voice recognition software. Read the chart carefully and recognize, using context, where substitutions have occurred. If you have any questions, please contact the dictating provider.    "

## 2024-03-15 NOTE — PATIENT INSTRUCTIONS
Will cut spironolactone back to 25mg a day. With weight loss, this may cause lower blood pressure.   We discussed diamox causing the acid level issues but they are stable.   Please follow up blood work in 1 month.   Follow up in 3 months.

## 2024-03-16 DIAGNOSIS — G43.719 INTRACTABLE CHRONIC MIGRAINE WITHOUT AURA AND WITHOUT STATUS MIGRAINOSUS: ICD-10-CM

## 2024-03-18 ENCOUNTER — TELEPHONE (OUTPATIENT)
Dept: FAMILY MEDICINE CLINIC | Facility: CLINIC | Age: 39
End: 2024-03-18

## 2024-03-18 DIAGNOSIS — G25.81 RESTLESS LEG SYNDROME: ICD-10-CM

## 2024-03-18 DIAGNOSIS — K59.09 CHRONIC CONSTIPATION: ICD-10-CM

## 2024-03-18 RX ORDER — TOPIRAMATE 100 MG/1
150 TABLET, FILM COATED ORAL 2 TIMES DAILY
Qty: 270 TABLET | Refills: 1 | Status: SHIPPED | OUTPATIENT
Start: 2024-03-18

## 2024-03-18 NOTE — TELEPHONE ENCOUNTER
Reason for call:   [x] Refill   [] Prior Auth  [] Other:     Office:   [] PCP/Provider -   [x] Specialty/Provider - DIONNA / Jaiden    Medication: Linzess    Dose/Frequency: 290 mcg qd    Quantity: 90    Pharmacy: [id number] - [place] - [address] [place] [address]     Does the patient have enough for 3 days?   [x] Yes   [] No - Send as HP to POD

## 2024-03-18 NOTE — TELEPHONE ENCOUNTER
Patient called in informing us that when she decreased Requip to 2 Mg. Patient mentioned that she was experiencing pain in her arms while sleeping, more than hands just going numb. She is going to run out of medication aris Friday and does not want to be left without any medication over the weekend when she comes in for her appointment on Friday. Patient does not want to completely stop the medication or stop with the side affects. Patient is asking if 1 MG could be prescribed or stay at the 2 MG for now.

## 2024-03-20 RX ORDER — LINACLOTIDE 290 UG/1
290 CAPSULE, GELATIN COATED ORAL DAILY
Qty: 90 CAPSULE | Refills: 1 | Status: SHIPPED | OUTPATIENT
Start: 2024-03-20 | End: 2024-09-16

## 2024-03-20 NOTE — TELEPHONE ENCOUNTER
I called patient to let her know you sent the medication refill in to her Pharmacy. She expressed understanding.

## 2024-03-21 RX ORDER — ROPINIROLE 2 MG/1
2 TABLET, FILM COATED ORAL
Qty: 30 TABLET | Refills: 0 | Status: SHIPPED | OUTPATIENT
Start: 2024-03-21 | End: 2024-04-20

## 2024-03-21 RX ORDER — ROPINIROLE 2 MG/1
TABLET, FILM COATED ORAL
Qty: 30 TABLET | Refills: 0 | OUTPATIENT
Start: 2024-03-21

## 2024-03-21 NOTE — TELEPHONE ENCOUNTER
Xander Vargas,    Please inform patient that I have kept her at the 2mg dose given message and we will definitely discuss her symptoms on the 2mg at the appt on Friday.

## 2024-03-22 ENCOUNTER — OFFICE VISIT (OUTPATIENT)
Dept: FAMILY MEDICINE CLINIC | Facility: CLINIC | Age: 39
End: 2024-03-22
Payer: COMMERCIAL

## 2024-03-22 VITALS
WEIGHT: 293 LBS | SYSTOLIC BLOOD PRESSURE: 124 MMHG | DIASTOLIC BLOOD PRESSURE: 72 MMHG | OXYGEN SATURATION: 99 % | BODY MASS INDEX: 45.99 KG/M2 | HEIGHT: 67 IN | HEART RATE: 70 BPM | TEMPERATURE: 96.6 F

## 2024-03-22 DIAGNOSIS — F63.0 GAMBLING DISORDER, MODERATE: ICD-10-CM

## 2024-03-22 DIAGNOSIS — G25.81 RLS (RESTLESS LEGS SYNDROME): Primary | ICD-10-CM

## 2024-03-22 PROCEDURE — 99213 OFFICE O/P EST LOW 20 MIN: CPT | Performed by: FAMILY MEDICINE

## 2024-03-22 PROCEDURE — G2211 COMPLEX E/M VISIT ADD ON: HCPCS | Performed by: FAMILY MEDICINE

## 2024-03-22 NOTE — PROGRESS NOTES
"Assessment/Plan:    Patient presents for taper of Requip from 4 mg to 2 mg. At this time, patient feels she is doing well on 2 mg dose and has follow-up with physiatry on 3/29/2024 and will further discuss changes with medical team. Patient also reports that gambling symptomatology has improved since decrease of Requip.    No problem-specific Assessment & Plan notes found for this encounter.       Diagnoses and all orders for this visit:    RLS (restless legs syndrome)    Gambling disorder, moderate          Subjective:      Patient ID: Gita eRndon is a 38 y.o. female.    Patient presents for follow-up of Requip taper for RLS  Patient had reached out to PCP previous to visit stating that she was experiencing paresthesias of her bilateral upper extremities  At today's visit patient does report that she noticed some accentuation of upper extremity paresthesias after decreasing Requip from 4 mg to 2 mg  However, patient also reports that in that same week she unfortunately had a severe headache attack/flare which also tends to worsen paresthesia symptoms  Patient also reports that gambling symptomatology has improved since decrease of Requip          The following portions of the patient's history were reviewed and updated as appropriate: allergies, current medications, past family history, past medical history, past social history, past surgical history, and problem list.    Review of Systems   Neurological:         Paresthesias         Objective:      /72   Pulse 70   Temp (!) 96.6 °F (35.9 °C)   Ht 5' 7\" (1.702 m)   Wt (!) 152 kg (334 lb 8 oz)   SpO2 99%   BMI 52.39 kg/m²          Physical Exam  Constitutional:       Appearance: Normal appearance.   HENT:      Head: Normocephalic and atraumatic.      Right Ear: External ear normal.      Left Ear: External ear normal.   Eyes:      Conjunctiva/sclera: Conjunctivae normal.   Cardiovascular:      Rate and Rhythm: Normal rate and regular rhythm. "   Pulmonary:      Effort: Pulmonary effort is normal.      Breath sounds: Normal breath sounds.   Neurological:      Mental Status: She is alert and oriented to person, place, and time.

## 2024-03-28 ENCOUNTER — APPOINTMENT (EMERGENCY)
Dept: CT IMAGING | Facility: HOSPITAL | Age: 39
End: 2024-03-28
Payer: COMMERCIAL

## 2024-03-28 ENCOUNTER — HOSPITAL ENCOUNTER (EMERGENCY)
Facility: HOSPITAL | Age: 39
Discharge: HOME | End: 2024-03-28
Attending: EMERGENCY MEDICINE
Payer: COMMERCIAL

## 2024-03-28 VITALS
HEART RATE: 69 BPM | OXYGEN SATURATION: 100 % | SYSTOLIC BLOOD PRESSURE: 149 MMHG | DIASTOLIC BLOOD PRESSURE: 63 MMHG | TEMPERATURE: 98.2 F | RESPIRATION RATE: 20 BRPM

## 2024-03-28 DIAGNOSIS — R63.0 POOR APPETITE: ICD-10-CM

## 2024-03-28 DIAGNOSIS — R82.71 BACTERIURIA: ICD-10-CM

## 2024-03-28 DIAGNOSIS — G43.909 MIGRAINE: ICD-10-CM

## 2024-03-28 DIAGNOSIS — R11.0 NAUSEA: Primary | ICD-10-CM

## 2024-03-28 DIAGNOSIS — R68.81 EARLY SATIETY: ICD-10-CM

## 2024-03-28 DIAGNOSIS — K57.92 DIVERTICULITIS: ICD-10-CM

## 2024-03-28 LAB
ALBUMIN SERPL BCP-MCNC: 4.3 G/DL (ref 3.5–5)
ALP SERPL-CCNC: 69 U/L (ref 34–104)
ALT SERPL W P-5'-P-CCNC: 11 U/L (ref 7–52)
ANION GAP SERPL CALCULATED.3IONS-SCNC: 11 MMOL/L (ref 4–13)
AST SERPL W P-5'-P-CCNC: 12 U/L (ref 13–39)
BACTERIA UR QL AUTO: ABNORMAL /HPF
BASOPHILS # BLD AUTO: 0.09 THOUSANDS/ÂΜL (ref 0–0.1)
BASOPHILS NFR BLD AUTO: 1 % (ref 0–1)
BILIRUB SERPL-MCNC: 0.4 MG/DL (ref 0.2–1)
BILIRUB UR QL STRIP: ABNORMAL
BUN SERPL-MCNC: 25 MG/DL (ref 5–25)
CALCIUM SERPL-MCNC: 9.9 MG/DL (ref 8.4–10.2)
CAOX CRY URNS QL MICRO: ABNORMAL /HPF
CHLORIDE SERPL-SCNC: 111 MMOL/L (ref 96–108)
CLARITY UR: CLEAR
CO2 SERPL-SCNC: 19 MMOL/L (ref 21–32)
COLOR UR: YELLOW
CREAT SERPL-MCNC: 0.8 MG/DL (ref 0.6–1.3)
EOSINOPHIL # BLD AUTO: 0.25 THOUSAND/ÂΜL (ref 0–0.61)
EOSINOPHIL NFR BLD AUTO: 2 % (ref 0–6)
ERYTHROCYTE [DISTWIDTH] IN BLOOD BY AUTOMATED COUNT: 13.2 % (ref 11.6–15.1)
EXT PREGNANCY TEST URINE: NEGATIVE
EXT. CONTROL: NORMAL
FLUAV RNA RESP QL NAA+PROBE: NEGATIVE
FLUBV RNA RESP QL NAA+PROBE: NEGATIVE
GFR SERPL CREATININE-BSD FRML MDRD: 93 ML/MIN/1.73SQ M
GLUCOSE SERPL-MCNC: 80 MG/DL (ref 65–140)
GLUCOSE UR STRIP-MCNC: NEGATIVE MG/DL
HCT VFR BLD AUTO: 47.9 % (ref 34.8–46.1)
HGB BLD-MCNC: 14.9 G/DL (ref 11.5–15.4)
HGB UR QL STRIP.AUTO: NEGATIVE
IMM GRANULOCYTES # BLD AUTO: 0.04 THOUSAND/UL (ref 0–0.2)
IMM GRANULOCYTES NFR BLD AUTO: 0 % (ref 0–2)
KETONES UR STRIP-MCNC: ABNORMAL MG/DL
LACTATE SERPL-SCNC: 1 MMOL/L (ref 0.5–2)
LEUKOCYTE ESTERASE UR QL STRIP: NEGATIVE
LIPASE SERPL-CCNC: 9 U/L (ref 11–82)
LYMPHOCYTES # BLD AUTO: 3.37 THOUSANDS/ÂΜL (ref 0.6–4.47)
LYMPHOCYTES NFR BLD AUTO: 24 % (ref 14–44)
MCH RBC QN AUTO: 28.3 PG (ref 26.8–34.3)
MCHC RBC AUTO-ENTMCNC: 31.1 G/DL (ref 31.4–37.4)
MCV RBC AUTO: 91 FL (ref 82–98)
MONOCYTES # BLD AUTO: 1.14 THOUSAND/ÂΜL (ref 0.17–1.22)
MONOCYTES NFR BLD AUTO: 8 % (ref 4–12)
NEUTROPHILS # BLD AUTO: 9.4 THOUSANDS/ÂΜL (ref 1.85–7.62)
NEUTS SEG NFR BLD AUTO: 65 % (ref 43–75)
NITRITE UR QL STRIP: NEGATIVE
NON-SQ EPI CELLS URNS QL MICRO: ABNORMAL /HPF
NRBC BLD AUTO-RTO: 0 /100 WBCS
PH UR STRIP.AUTO: 6 [PH]
PLATELET # BLD AUTO: 321 THOUSANDS/UL (ref 149–390)
PMV BLD AUTO: 9.9 FL (ref 8.9–12.7)
POTASSIUM SERPL-SCNC: 3.3 MMOL/L (ref 3.5–5.3)
PROT SERPL-MCNC: 6.9 G/DL (ref 6.4–8.4)
PROT UR STRIP-MCNC: ABNORMAL MG/DL
RBC # BLD AUTO: 5.27 MILLION/UL (ref 3.81–5.12)
RBC #/AREA URNS AUTO: ABNORMAL /HPF
RSV RNA RESP QL NAA+PROBE: NEGATIVE
SARS-COV-2 RNA RESP QL NAA+PROBE: NEGATIVE
SODIUM SERPL-SCNC: 141 MMOL/L (ref 135–147)
SP GR UR STRIP.AUTO: >=1.03 (ref 1–1.03)
UROBILINOGEN UR QL STRIP.AUTO: 0.2 E.U./DL
WBC # BLD AUTO: 14.29 THOUSAND/UL (ref 4.31–10.16)
WBC #/AREA URNS AUTO: ABNORMAL /HPF

## 2024-03-28 PROCEDURE — 85025 COMPLETE CBC W/AUTO DIFF WBC: CPT | Performed by: EMERGENCY MEDICINE

## 2024-03-28 PROCEDURE — 83690 ASSAY OF LIPASE: CPT | Performed by: EMERGENCY MEDICINE

## 2024-03-28 PROCEDURE — 83605 ASSAY OF LACTIC ACID: CPT | Performed by: EMERGENCY MEDICINE

## 2024-03-28 PROCEDURE — 80053 COMPREHEN METABOLIC PANEL: CPT | Performed by: EMERGENCY MEDICINE

## 2024-03-28 PROCEDURE — 96366 THER/PROPH/DIAG IV INF ADDON: CPT

## 2024-03-28 PROCEDURE — 81025 URINE PREGNANCY TEST: CPT | Performed by: EMERGENCY MEDICINE

## 2024-03-28 PROCEDURE — 99284 EMERGENCY DEPT VISIT MOD MDM: CPT

## 2024-03-28 PROCEDURE — 81001 URINALYSIS AUTO W/SCOPE: CPT | Performed by: EMERGENCY MEDICINE

## 2024-03-28 PROCEDURE — 74177 CT ABD & PELVIS W/CONTRAST: CPT

## 2024-03-28 PROCEDURE — 96365 THER/PROPH/DIAG IV INF INIT: CPT

## 2024-03-28 PROCEDURE — 99285 EMERGENCY DEPT VISIT HI MDM: CPT | Performed by: EMERGENCY MEDICINE

## 2024-03-28 PROCEDURE — 0241U HB NFCT DS VIR RESP RNA 4 TRGT: CPT | Performed by: EMERGENCY MEDICINE

## 2024-03-28 PROCEDURE — 96375 TX/PRO/DX INJ NEW DRUG ADDON: CPT

## 2024-03-28 PROCEDURE — 36415 COLL VENOUS BLD VENIPUNCTURE: CPT | Performed by: EMERGENCY MEDICINE

## 2024-03-28 RX ORDER — DIPHENHYDRAMINE HYDROCHLORIDE 50 MG/ML
25 INJECTION INTRAMUSCULAR; INTRAVENOUS ONCE
Status: COMPLETED | OUTPATIENT
Start: 2024-03-28 | End: 2024-03-28

## 2024-03-28 RX ORDER — METRONIDAZOLE 500 MG/1
500 TABLET ORAL ONCE
Status: COMPLETED | OUTPATIENT
Start: 2024-03-28 | End: 2024-03-28

## 2024-03-28 RX ORDER — KETOROLAC TROMETHAMINE 30 MG/ML
30 INJECTION, SOLUTION INTRAMUSCULAR; INTRAVENOUS ONCE
Status: COMPLETED | OUTPATIENT
Start: 2024-03-28 | End: 2024-03-28

## 2024-03-28 RX ORDER — METOCLOPRAMIDE HYDROCHLORIDE 5 MG/ML
10 INJECTION INTRAMUSCULAR; INTRAVENOUS ONCE
Status: COMPLETED | OUTPATIENT
Start: 2024-03-28 | End: 2024-03-28

## 2024-03-28 RX ORDER — CIPROFLOXACIN 500 MG/1
500 TABLET, FILM COATED ORAL 2 TIMES DAILY
Qty: 10 TABLET | Refills: 0 | Status: SHIPPED | OUTPATIENT
Start: 2024-03-28 | End: 2024-04-02

## 2024-03-28 RX ORDER — CIPROFLOXACIN 500 MG/1
500 TABLET, FILM COATED ORAL ONCE
Status: COMPLETED | OUTPATIENT
Start: 2024-03-28 | End: 2024-03-28

## 2024-03-28 RX ORDER — MAGNESIUM SULFATE HEPTAHYDRATE 40 MG/ML
2 INJECTION, SOLUTION INTRAVENOUS ONCE
Status: COMPLETED | OUTPATIENT
Start: 2024-03-28 | End: 2024-03-28

## 2024-03-28 RX ORDER — METRONIDAZOLE 500 MG/1
500 TABLET ORAL EVERY 8 HOURS SCHEDULED
Qty: 15 TABLET | Refills: 0 | Status: SHIPPED | OUTPATIENT
Start: 2024-03-28 | End: 2024-04-02

## 2024-03-28 RX ADMIN — IOHEXOL 100 ML: 350 INJECTION, SOLUTION INTRAVENOUS at 21:02

## 2024-03-28 RX ADMIN — MAGNESIUM SULFATE HEPTAHYDRATE 2 G: 40 INJECTION, SOLUTION INTRAVENOUS at 20:19

## 2024-03-28 RX ADMIN — CIPROFLOXACIN 500 MG: 500 TABLET ORAL at 22:09

## 2024-03-28 RX ADMIN — METOCLOPRAMIDE 10 MG: 5 INJECTION, SOLUTION INTRAMUSCULAR; INTRAVENOUS at 20:18

## 2024-03-28 RX ADMIN — DIPHENHYDRAMINE HYDROCHLORIDE 25 MG: 50 INJECTION, SOLUTION INTRAMUSCULAR; INTRAVENOUS at 20:18

## 2024-03-28 RX ADMIN — SODIUM CHLORIDE 1000 ML: 0.9 INJECTION, SOLUTION INTRAVENOUS at 20:19

## 2024-03-28 RX ADMIN — METRONIDAZOLE 500 MG: 500 TABLET ORAL at 22:09

## 2024-03-28 RX ADMIN — KETOROLAC TROMETHAMINE 30 MG: 30 INJECTION, SOLUTION INTRAMUSCULAR; INTRAVENOUS at 20:18

## 2024-03-30 NOTE — ED PROVIDER NOTES
"History  Chief Complaint   Patient presents with    Medical Problem     Pt reports feeling \"run down\" for the past couple days. Pt also reports taking a weight loss medication and has not been eating normal for the last few days and she isn't sure if that is contributing to how she is feeling. Pt also reports a migraine that started today.       Medical Problem  Associated symptoms: fatigue    Associated symptoms: no abdominal pain, no chest pain, no cough, no diarrhea, no ear pain, no fever, no nausea, no rash, no shortness of breath, no sore throat and no vomiting    38-year-old female with a past medical history significant for HTN, hypothyroidism, obesity, psychiatric disorder, idiopathic intracranial hypertension on Diamox, chronic headaches, hyper aldosteronism on spironolactone, presented to the emergency department for evaluation of feeling fatigued/rundown times days and poor appetite times days to weeks as well as migraine headache.    Patient reports that she has been a weight loss medication for months and has been on and off of somatically side which she has been currently taking in some form for months now.  She reports that she has had side effects of this while on these medications and has tried various medications to help with this but is losing weight and thinks that is helping her overall.  She reports the last several days to weeks she has been having worsening symptoms of decreased appetite decreased overall calorie intake, fatigue also today with mild headache she describes a migraine without history of head trauma.  She denies any significant nausea vomiting or abdominal pain.  She denies abdominal bloating.    Prior to Admission Medications   Prescriptions Last Dose Informant Patient Reported? Taking?   Auvelity  MG TBCR Not Taking Self Yes No   Sig: Take 1 tablet by mouth 2 (two) times a day   Patient not taking: Reported on 2/21/2024   Cequa 0.09 % SOLN 3/28/2024 Self No Yes   Sig: " Apply 1 drop to eye 2 (two) times a day   Erenumab-aooe (Aimovig) 140 MG/ML SOAJ Past Week Self Yes Yes   Sig: Inject 140 mg under the skin every 28 days   LORazepam (ATIVAN) 0.5 mg tablet More than a month Self Yes No   Sig: Take 0.5 mg by mouth 2 (two) times a day as needed for anxiety   Multiple Vitamin (MULTIVITAMIN) capsule 3/28/2024 Self Yes Yes   Sig: Take 1 capsule by mouth daily   Semaglutide-Weight Management (WEGOVY) 1.7 MG/0.75ML 3/27/2024 Self No Yes   Sig: Inject 0.75 mL (1.7 mg total) under the skin once a week   Trintellix 5 MG tablet 3/28/2024 Self Yes Yes   Sig: Take 10 mg by mouth daily   Vraylar 6 MG capsule 3/28/2024 Self Yes Yes   Sig: Take 6 mg by mouth daily Daily in the morning   Vyvanse 70 MG capsule 3/28/2024 Self Yes Yes   Sig: Take 70 mg by mouth daily   acetaZOLAMIDE (DIAMOX) 250 mg tablet 3/28/2024 Self No Yes   Si tablets by mouth twice a day   cariprazine (Vraylar) 3 MG capsule 3/28/2024 Self Yes Yes   Sig: Take 3 mg by mouth daily at bedtime   celecoxib (CeleBREX) 200 mg capsule 3/28/2024 Self No Yes   Sig: take 1 capsule by mouth twice a day   cholecalciferol (VITAMIN D3) 1,000 units tablet 3/28/2024 Self Yes Yes   Sig: Take 5,000 Units by mouth daily   folic acid (FOLVITE) 1 mg tablet 3/28/2024 Self Yes Yes   Sig: Take 1 mg by mouth daily   gabapentin (NEURONTIN) 300 mg capsule 3/28/2024 Self Yes Yes   Sig: Take 300 mg by mouth 2 (two) times a day Once in the AM and once in the PM.   levothyroxine 125 mcg tablet 3/28/2024 Self No Yes   Sig: Take 1 tablet (125 mcg total) by mouth daily in the early morning   linaCLOtide (Linzess) 290 MCG CAPS 3/28/2024 Self No Yes   Sig: Take 1 capsule by mouth daily   loratadine (CLARITIN) 10 mg tablet 3/28/2024 Self No Yes   Sig: Take 1 tablet (10 mg total) by mouth daily   loteprednol etabonate (LOTEMAX) 0.5 % ophthalmic suspension 3/28/2024 Self Yes Yes   metFORMIN (GLUCOPHAGE) 500 mg tablet 3/28/2024 Self No Yes   Sig: Take 1 tablet (500  mg total) by mouth daily with breakfast   omeprazole (PriLOSEC) 20 mg delayed release capsule 3/28/2024 Self No Yes   Sig: take 1 capsule by mouth twice a day   propranolol (INDERAL) 20 mg tablet 3/27/2024 Self Yes Yes   Sig: Take 20 mg by mouth every 12 (twelve) hours   rOPINIRole (REQUIP) 2 mg tablet 3/27/2024 Self No Yes   Sig: Take 1 tablet (2 mg total) by mouth daily at bedtime   rimegepant sulfate (Nurtec) 75 mg TBDP Not Taking Self No No   Sig: TAKE 75MG BY MOUTH EVERY OTHER DAY. IF THERE IS A MIGRAINE ON A NON-NURTEC DAY ,YOU CAN TAKE A DOSE AND SKIP THE FOLLOWING DAY   Patient not taking: Reported on 3/28/2024   rizatriptan (MAXALT) 10 mg tablet Not Taking Self No No   Sig: Take 1 tablet (10 mg total) by mouth once as needed for migraine May repeat every 2 hours if needed, max 30mg/24 hours   Patient not taking: Reported on 3/28/2024   spironolactone (ALDACTONE) 25 mg tablet 3/28/2024 Self No Yes   Sig: Take 1 tablet (25 mg total) by mouth daily   topiramate (TOPAMAX) 100 mg tablet 3/28/2024 Self No Yes   Sig: take 1 AND 1/2 tablets by mouth twice a day      Facility-Administered Medications: None       Past Medical History:   Diagnosis Date    Allergic     Allergic rhinitis     Anorexia nervosa in remission     Anxiety     Arthritis 5/8/2014    Back pain     Bipolar disorder (HCC)     Depression     Dermatitis 10/16/2021    Disease of thyroid gland     Diverticulitis of colon 9/20/2015    Dizziness     Ear problems     Eating disorder     Esophageal reflux 08/15/2013    GERD (gastroesophageal reflux disease) 8/15/2013    Headache(784.0)     Headache, tension-type     Hypertension     Hypothyroid     Idiopathic intracranial hypertension     Lumbar degenerative disc disease 05/08/2014    Migraine     Nasal congestion     Nosebleed     Obesity     Obsessive-compulsive disorder     Otitis media     Overactive bladder     Psychiatric disorder     Restless leg syndrome, controlled     Seasonal allergies      Sinusitis     Sleep apnea     Sleep difficulties     Suicide and self-inflicted injury (HCC)     Tinnitus     TMJ dysfunction     Tonsillitis     Transcranial Magnetic Stimulation 09/06/2021    Urinary tract infection     Vision loss        Past Surgical History:   Procedure Laterality Date    DENTAL SURGERY      wisdom teeth-at [age] years. Other surgery dental extraxtion of bone spur (10 years ago)    IR LUMBAR PUNCTURE  02/26/2019    SINUS ENDOSCOPY      SINUS SURGERY      TONSILLECTOMY         Family History   Problem Relation Age of Onset    Mental illness Mother     Depression Mother     Suicide Attempts Mother     Hypertension Mother     Diabetes Mother     Other Mother         bicuspid aortic valve    Anxiety disorder Mother     Psychiatric Illness Mother     Schizoaffective Disorder  Mother     Anemia Mother     Hypertension Father     Hypothyroidism Father     Thyroid disease Father     Hypertension Brother     Cancer Maternal Grandmother     Heart disease Maternal Grandmother     Stroke Maternal Grandmother     Breast cancer Maternal Grandmother     Skin cancer Maternal Grandmother     Arthritis Maternal Grandmother     Pneumonia Maternal Grandfather     Cancer Maternal Grandfather     Dementia Maternal Grandfather     COPD Maternal Grandfather     Cancer Paternal Grandmother     Pneumonia Paternal Grandfather     Arthritis Family     Breast cancer Family     Osteopenia Family     Osteoporosis Family     Hypertension Family     Transient ischemic attack Family      I have reviewed and agree with the history as documented.    E-Cigarette/Vaping    E-Cigarette Use Never User      E-Cigarette/Vaping Substances    Nicotine No     THC No     CBD No     Flavoring No     Other No     Unknown No      Social History     Tobacco Use    Smoking status: Never     Passive exposure: Never    Smokeless tobacco: Never   Vaping Use    Vaping status: Never Used   Substance Use Topics    Alcohol use: Never    Drug use:  Never       Review of Systems   Constitutional:  Positive for appetite change and fatigue. Negative for chills and fever.   HENT:  Negative for ear pain and sore throat.    Eyes:  Negative for pain and visual disturbance.   Respiratory:  Negative for cough and shortness of breath.    Cardiovascular:  Negative for chest pain and palpitations.   Gastrointestinal:  Negative for abdominal pain, diarrhea, nausea and vomiting.   Genitourinary:  Negative for dysuria and hematuria.   Musculoskeletal:  Negative for arthralgias and back pain.   Skin:  Negative for color change and rash.   Neurological:  Negative for seizures and syncope.   All other systems reviewed and are negative.      Physical Exam  Physical Exam  Vitals and nursing note reviewed. Exam conducted with a chaperone present.   Constitutional:       General: She is not in acute distress.     Appearance: She is well-developed. She is obese. She is not ill-appearing, toxic-appearing or diaphoretic.   HENT:      Head: Normocephalic and atraumatic.      Right Ear: External ear normal.      Left Ear: External ear normal.      Nose: Nose normal.      Mouth/Throat:      Mouth: Mucous membranes are moist.      Pharynx: Oropharynx is clear.   Eyes:      Conjunctiva/sclera: Conjunctivae normal.   Cardiovascular:      Rate and Rhythm: Normal rate and regular rhythm.      Heart sounds: No murmur heard.  Pulmonary:      Effort: Pulmonary effort is normal. No respiratory distress.      Breath sounds: Normal breath sounds.   Abdominal:      Palpations: Abdomen is soft.      Tenderness: There is no abdominal tenderness. There is no guarding or rebound.   Musculoskeletal:         General: No swelling.      Cervical back: Neck supple.   Skin:     General: Skin is warm and dry.      Capillary Refill: Capillary refill takes less than 2 seconds.   Neurological:      General: No focal deficit present.      Mental Status: She is alert. Mental status is at baseline.   Psychiatric:          Mood and Affect: Mood normal.         Vital Signs  ED Triage Vitals [03/28/24 1928]   Temperature Pulse Respirations Blood Pressure SpO2   98.2 °F (36.8 °C) 91 20 152/75 100 %      Temp Source Heart Rate Source Patient Position - Orthostatic VS BP Location FiO2 (%)   Temporal Monitor Sitting Right arm --      Pain Score       6           Vitals:    03/28/24 1928 03/28/24 1945 03/28/24 2130   BP: 152/75  149/63   Pulse: 91 69    Patient Position - Orthostatic VS: Sitting           Visual Acuity      ED Medications  Medications   ketorolac (TORADOL) injection 30 mg (30 mg Intravenous Given 3/28/24 2018)   metoclopramide (REGLAN) injection 10 mg (10 mg Intravenous Given 3/28/24 2018)   diphenhydrAMINE (BENADRYL) injection 25 mg (25 mg Intravenous Given 3/28/24 2018)   magnesium sulfate 2 g/50 mL IVPB (premix) 2 g (0 g Intravenous Stopped 3/28/24 2206)   sodium chloride 0.9 % bolus 1,000 mL (0 mL Intravenous Stopped 3/28/24 2206)   iohexol (OMNIPAQUE) 350 MG/ML injection (MULTI-DOSE) 100 mL (100 mL Intravenous Given 3/28/24 2102)   ciprofloxacin (CIPRO) tablet 500 mg (500 mg Oral Given 3/28/24 2209)   metroNIDAZOLE (FLAGYL) tablet 500 mg (500 mg Oral Given 3/28/24 2209)       Diagnostic Studies  Results Reviewed       Procedure Component Value Units Date/Time    FLU/RSV/COVID - if FLU/RSV clinically relevant [[id number]]  (Normal) Collected: 03/28/24 2017    Lab Status: Final result Specimen: Nares from Nose Updated: 03/28/24 2102     SARS-CoV-2 Negative     INFLUENZA A PCR Negative     INFLUENZA B PCR Negative     RSV PCR Negative    Narrative:      FOR PEDIATRIC PATIENTS - copy/paste COVID Guidelines URL to browser: [email or web address]    SARS-CoV-2 assay is a Nucleic Acid Amplification assay intended for the  qualitative detection of nucleic acid from SARS-CoV-2 in nasopharyngeal  swabs. Results are for the presumptive identification of SARS-CoV-2  RNA.    Positive results are indicative of infection with SARS-CoV-2, the virus  causing COVID-19, but do not rule out bacterial infection or co-infection  with other viruses. Laboratories within the [place] and its  territories are required to report all positive results to the appropriate  public health authorities. Negative results do not preclude SARS-CoV-2  infection and should not be used as the sole basis for treatment or other  patient management decisions. Negative results must be combined with  clinical observations, patient history, and epidemiological information.  This test has not been FDA cleared or approved.    This test has been authorized by FDA under an Emergency Use Authorization  (EUA). This test is only authorized for the duration of time the  declaration that circumstances exist justifying the authorization of the  emergency use of an in vitro diagnostic tests for detection of SARS-CoV-2  virus and/or diagnosis of COVID-19 infection under section 564(b)(1) of  the Act, 21 U.S.C. 360bbb-3(b)(1), unless the authorization is terminated  or revoked sooner. The test has been validated but independent review by FDA  and CLIA is pending.    Test performed using [employer]: This RT-PCR assay targets N2,  a region unique to SARS-CoV-2. A conserved region in the E-gene was chosen  for pan-Sarbecovirus detection which includes SARS-CoV-2.    According to CMS-2020-01-R, this platform meets the definition of high-throughput technology.    Comprehensive metabolic panel [[id number]]  (Abnormal) Collected: 03/28/24 2017    Lab Status: Final result Specimen: Blood from Arm, Right Updated: 03/28/24 2044     Sodium 141 mmol/L      Potassium 3.3 mmol/L      Chloride 111 mmol/L      CO2 19 mmol/L      ANION GAP 11 mmol/L      BUN 25 mg/dL      Creatinine 0.80 mg/dL      Glucose 80 mg/dL      Calcium 9.9 mg/dL      AST 12 U/L      ALT 11 U/L      Alkaline Phosphatase 69 U/L      Total Protein 6.9 g/dL       Albumin 4.3 g/dL      Total Bilirubin 0.40 mg/dL      eGFR 93 ml/min/1.73sq m     Narrative:      National Kidney Disease Foundation guidelines for Chronic Kidney Disease (CKD):     Stage 1 with normal or high GFR (GFR > 90 mL/min/1.73 square meters)    Stage 2 Mild CKD (GFR = 60-89 mL/min/1.73 square meters)    Stage 3A Moderate CKD (GFR = 45-59 mL/min/1.73 square meters)    Stage 3B Moderate CKD (GFR = 30-44 mL/min/1.73 square meters)    Stage 4 Severe CKD (GFR = 15-29 mL/min/1.73 square meters)    Stage 5 End Stage CKD (GFR <15 mL/min/1.73 square meters)  Note: GFR calculation is accurate only with a steady state creatinine    Lipase [[id number]]  (Abnormal) Collected: 03/28/24 2017    Lab Status: Final result Specimen: Blood from Arm, Right Updated: 03/28/24 2044     Lipase 9 u/L     Lactic acid, plasma (w/reflex if result > 2.0) [[id number]]  (Normal) Collected: 03/28/24 2017    Lab Status: Final result Specimen: Blood from Arm, Right Updated: 03/28/24 2042     LACTIC ACID 1.0 mmol/L     Narrative:      Result may be elevated if tourniquet was used during collection.    Urine Microscopic [[id number]]  (Abnormal) Collected: 03/28/24 2017    Lab Status: Final result Specimen: Urine, Clean Catch Updated: 03/28/24 2037     RBC, UA 0-1 /hpf      WBC, UA 0-1 /hpf      Epithelial Cells Occasional /hpf      Bacteria, UA Moderate /hpf      Ca Oxalate Alida, UA Moderate /hpf     UA w Reflex to Microscopic w Reflex to Culture [[id number]]  (Abnormal) Collected: 03/28/24 2017    Lab Status: Final result Specimen: Urine, Clean Catch Updated: 03/28/24 2028     Color, UA Yellow     Clarity, UA Clear     Specific Gravity, UA >=1.030     pH, UA 6.0     Leukocytes, UA Negative     Nitrite, UA Negative     Protein, UA Trace mg/dl      Glucose, UA Negative mg/dl      Ketones, UA 15 (1+) mg/dl      Urobilinogen, UA 0.2 E.U./dl      Bilirubin, UA Moderate     Occult Blood, UA Negative    CBC and differential [[id number]]  (Abnormal)  Collected: 03/28/24 2017    Lab Status: Final result Specimen: Blood from Arm, Right Updated: 03/28/24 2023     WBC 14.29 Thousand/uL      RBC 5.27 Million/uL      Hemoglobin 14.9 g/dL      Hematocrit 47.9 %      MCV 91 fL      MCH 28.3 pg      MCHC 31.1 g/dL      RDW 13.2 %      MPV 9.9 fL      Platelets 321 Thousands/uL      nRBC 0 /100 WBCs      Neutrophils Relative 65 %      Immature Grans % 0 %      Lymphocytes Relative 24 %      Monocytes Relative 8 %      Eosinophils Relative 2 %      Basophils Relative 1 %      Neutrophils Absolute 9.40 Thousands/µL      Absolute Immature Grans 0.04 Thousand/uL      Absolute Lymphocytes 3.37 Thousands/µL      Absolute Monocytes 1.14 Thousand/µL      Eosinophils Absolute 0.25 Thousand/µL      Basophils Absolute 0.09 Thousands/µL     POCT pregnancy, urine [[id number]]  (Normal) Resulted: 03/28/24 2016    Lab Status: Final result Updated: 03/28/24 2016     EXT Preg Test, Ur Negative     Control Valid                   CT abdomen pelvis with contrast   Final Result by Son Marcial DO (03/28 2136)      Question is raised of mild colonic wall thickening with pericolonic fluid at the sigmoid colon (axial image 159 through 161). Findings may possibly reflect minimal degree of pelvic free fluid with adjacent reactive bowel wall thickening, however focal    mild colitis or diverticulitis is not definitely excluded. Correlate clinically.            Workstation performed: [id number]                    Procedures  Procedures         ED Course  ED Course as of 03/30/24 1758   u Mar 28, 2024   2152 CT scan reviewed there is no strong clinical correlation with diverticulitis as patient is afebrile and has no lower abdominal pain or tenderness on examination nor diarrhea.  Did discuss the finding with patient.  Given leukocytosis offered.  Treatment however think this patient's symptoms are likely due to other etiologies as this has been a chronic symptom over months which is worsening  and is more likely related to GLP-1 agent or other chronic issues.   2156 I reviewed this with the patient given the bacteriuria, new leukocytosis, lack of other acute changes in her workup to explain the symptoms, and CT findings suggesting possible diverticulitis she would like to proceed with treatment with antimicrobials.  Discussed risk and benefits highlighted that the symptoms could potentially improve on their own without therapies with of these therapies could make her presenting symptoms worse however it is believed that risk-benefit is favorable for her starting antibiotics at this time she will return if symptoms worsen.                               SBIRT 20yo+      Flowsheet Row Most Recent Value   Initial Alcohol Screen: US AUDIT-C     1. How often do you have a drink containing alcohol? 0 Filed at: 03/28/2024 1931   2. How many drinks containing alcohol do you have on a typical day you are drinking?  0 Filed at: 03/28/2024 1931   3a. Male UNDER 65: How often do you have five or more drinks on one occasion? 0 Filed at: 03/28/2024 1931   3b. FEMALE Any Age, or MALE 65+: How often do you have 4 or more drinks on one occassion? 0 Filed at: 03/28/2024 1931   Audit-C Score 0 Filed at: 03/28/2024 1931   JENNIFER: How many times in the past year have you...    Used an illegal drug or used a prescription medication for non-medical reasons? Never Filed at: 03/28/2024 1931                      Medical Decision Making  Is a 38-year-old female who presents with worsening chronic symptoms of fatigue and poor appetite.  She is on GLP-1 agent however she has been stable on this and is not having nausea vomiting or diarrhea.  Abdominal exam is unremarkable.  Proceed with workup including symptomatic treatment of her headache with migraine cocktail and screening labs.  No obvious indication for CT imaging initially based on lack of abdominal tenderness or pain and no vomiting or diarrhea.  Labs revealed some changes to  the chemistry which are chronic/stable and associated with the Diamox use.  There was bacteria noted with the patient not having obvious UTI symptoms.  There was also leukocytosis of unclear significance which was new for patient.  These findings were discussed with the patient and we did decided to proceed with CT scan to better evaluate especially given her nonspecific complaints.  CT scan did demonstrate possible diverticulitis and after discussion of risks and benefits she does want to begin empiric therapy with antibiotic and I think given the bacteriuria we will treat with Cipro and Flagyl to cover both.  Patient improved symptomatically with migraine cocktail.  She was advised to follow-up with her prescribing provider for review of her weight loss medication to see if this should be considered for discontinuation or alternative therapies.  For the time being given any new metabolic derangements we will continue with her outpatient management with the antibiotics added.  Regarding patient's history of IIH, she has no severe headache no new neurologic deficits and improved symptomatically during ED course.  Do not feel further workup for new neurologic causes is required based on her complaints today.    Problems Addressed:  Bacteriuria: acute illness or injury  Diverticulitis: acute illness or injury  Early satiety: chronic illness or injury  Migraine: acute illness or injury  Nausea: acute illness or injury  Poor appetite: chronic illness or injury    Amount and/or Complexity of Data Reviewed  Labs: ordered.  Radiology: ordered.    Risk  Prescription drug management.             Disposition  Final diagnoses:   Nausea   Poor appetite   Early satiety   Diverticulitis   Bacteriuria   Migraine     Time reflects when diagnosis was documented in both MDM as applicable and the Disposition within this note       Time User Action Codes Description Comment    3/28/2024 10:01 PM Piyush Butcher Add [R11.0] Nausea      3/28/2024 10:01 PM Piyush Butcher [R63.0] Poor appetite     3/28/2024 10:01 PM Piyush Butcher [R68.81] Early satiety     3/28/2024 10:02 PM Piyush Butcher [K57.92] Diverticulitis     3/28/2024 10:02 PM Piyush Butcher [R82.71] Bacteriuria     3/28/2024 10:02 PM Piyush Butcher [G43.909] Migraine           ED Disposition       ED Disposition   Discharge    Condition   Stable    Date/Time   Thu Mar 28, 2024 2200    Comment   Gita Rendon discharge to home/self care.                   Follow-up Information       Follow up With Specialties Details Why Contact Info Additional Information    Norma Traore MD Family Medicine Schedule an appointment as soon as possible for a visit   [address] [facility] [address]  [phone]       [facility] Emergency Department Emergency Medicine Go to  If symptoms worsen [address] [facility] [address]  [phone] [facility] Emergency Department, [address], [place], [address]            Discharge Medication List as of 3/28/2024 10:04 PM        START taking these medications    Details   ciprofloxacin (CIPRO) 500 mg tablet Take 1 tablet (500 mg total) by mouth 2 (two) times a day for 5 days, Starting Thu 3/28/2024, Until Tue 4/2/2024, Normal      metroNIDAZOLE (FLAGYL) 500 mg tablet Take 1 tablet (500 mg total) by mouth every 8 (eight) hours for 5 days, Starting Thu 3/28/2024, Until Tue 4/2/2024, Normal           CONTINUE these medications which have NOT CHANGED    Details   acetaZOLAMIDE (DIAMOX) 250 mg tablet 2 tablets by mouth twice a day, Normal      !! cariprazine (Vraylar) 3 MG capsule Take 3 mg by mouth daily at bedtime, Historical Med      celecoxib (CeleBREX) 200 mg capsule take 1 capsule by mouth twice a day, Starting Sat 1/6/2024, Normal      Cequa 0.09 % SOLN Apply 1 drop to eye 2 (two) times a day, Starting Sat 8/5/2023, Normal       cholecalciferol (VITAMIN D3) 1,000 units tablet Take 5,000 Units by mouth daily, Historical Med      Erenumab-aooe (Aimovig) 140 MG/ML SOAJ Inject 140 mg under the skin every 28 days, Historical Med      folic acid (FOLVITE) 1 mg tablet Take 1 mg by mouth daily, Historical Med      gabapentin (NEURONTIN) 300 mg capsule Take 300 mg by mouth 2 (two) times a day Once in the AM and once in the PM., Historical Med      levothyroxine 125 mcg tablet Take 1 tablet (125 mcg total) by mouth daily in the early morning, Starting Mon 11/27/2023, Normal      linaCLOtide (Linzess) 290 MCG CAPS Take 1 capsule by mouth daily, Starting Wed 3/20/2024, Until Mon 9/16/2024, Normal      loratadine (CLARITIN) 10 mg tablet Take 1 tablet (10 mg total) by mouth daily, Starting Sat 8/5/2023, Normal      loteprednol etabonate (LOTEMAX) 0.5 % ophthalmic suspension Historical Med      metFORMIN (GLUCOPHAGE) 500 mg tablet Take 1 tablet (500 mg total) by mouth daily with breakfast, Starting Tue 3/5/2024, Normal      Multiple Vitamin (MULTIVITAMIN) capsule Take 1 capsule by mouth daily, Historical Med      omeprazole (PriLOSEC) 20 mg delayed release capsule take 1 capsule by mouth twice a day, Starting Mon 3/4/2024, Normal      propranolol (INDERAL) 20 mg tablet Take 20 mg by mouth every 12 (twelve) hours, Starting Fri 1/27/2023, Historical Med      rOPINIRole (REQUIP) 2 mg tablet Take 1 tablet (2 mg total) by mouth daily at bedtime, Starting Thu 3/21/2024, Until Sat 4/20/2024, Normal      Semaglutide-Weight Management (WEGOVY) 1.7 MG/0.75ML Inject 0.75 mL (1.7 mg total) under the skin once a week, Starting Tue 3/12/2024, Until Thu 4/11/2024, Normal      spironolactone (ALDACTONE) 25 mg tablet Take 1 tablet (25 mg total) by mouth daily, Starting Fri 3/15/2024, Normal      topiramate (TOPAMAX) 100 mg tablet take 1 AND 1/2 tablets by mouth twice a day, Starting Mon 3/18/2024, Normal      Trintellix 5 MG tablet Take 10 mg by mouth daily, Starting  Mon 2/19/2024, Historical Med      !! Vraylar 6 MG capsule Take 6 mg by mouth daily Daily in the morning, Starting Mon 6/1/2020, Historical Med      Vyvanse 70 MG capsule Take 70 mg by mouth daily, Starting Wed 8/30/2023, Historical Med      Auvelity  MG TBCR Take 1 tablet by mouth 2 (two) times a day, Starting Thu 12/21/2023, Historical Med      LORazepam (ATIVAN) 0.5 mg tablet Take 0.5 mg by mouth 2 (two) times a day as needed for anxiety, Historical Med      rimegepant sulfate (Nurtec) 75 mg TBDP TAKE 75MG BY MOUTH EVERY OTHER DAY. IF THERE IS A MIGRAINE ON A NON-NURTEC DAY ,YOU CAN TAKE A DOSE AND SKIP THE FOLLOWING DAY, Normal      rizatriptan (MAXALT) 10 mg tablet Take 1 tablet (10 mg total) by mouth once as needed for migraine May repeat every 2 hours if needed, max 30mg/24 hours, Starting Tue 10/24/2023, Normal       !! - Potential duplicate medications found. Please discuss with provider.          No discharge procedures on file.    PDMP Review         Value Time User    PDMP Reviewed  Yes 8/7/2023 12:35 PM LEIGHA Kaiser            ED Provider  Electronically Signed by             Piyush Butcher DO  03/30/24 6700

## 2024-04-03 ENCOUNTER — OFFICE VISIT (OUTPATIENT)
Dept: BEHAVIORAL/MENTAL HEALTH CLINIC | Facility: CLINIC | Age: 39
End: 2024-04-03
Payer: COMMERCIAL

## 2024-04-03 ENCOUNTER — HOSPITAL ENCOUNTER (EMERGENCY)
Facility: HOSPITAL | Age: 39
End: 2024-04-04
Attending: FAMILY MEDICINE
Payer: COMMERCIAL

## 2024-04-03 DIAGNOSIS — F41.1 GAD (GENERALIZED ANXIETY DISORDER): ICD-10-CM

## 2024-04-03 DIAGNOSIS — F32.A DEPRESSION: Primary | ICD-10-CM

## 2024-04-03 DIAGNOSIS — F33.2 SEVERE EPISODE OF RECURRENT MAJOR DEPRESSIVE DISORDER, WITHOUT PSYCHOTIC FEATURES (HCC): Primary | ICD-10-CM

## 2024-04-03 DIAGNOSIS — E26.9 HYPERALDOSTERONISM (HCC): ICD-10-CM

## 2024-04-03 DIAGNOSIS — M76.32 ILIOTIBIAL BAND SYNDROME OF LEFT SIDE: ICD-10-CM

## 2024-04-03 DIAGNOSIS — G63 THYROID DISEASE NEUROPATHY (HCC): ICD-10-CM

## 2024-04-03 DIAGNOSIS — F42.2 MIXED OBSESSIONAL THOUGHTS AND ACTS: ICD-10-CM

## 2024-04-03 DIAGNOSIS — E55.9 VITAMIN D DEFICIENCY: ICD-10-CM

## 2024-04-03 DIAGNOSIS — Z00.8 ENCOUNTER FOR PSYCHOLOGICAL EVALUATION: ICD-10-CM

## 2024-04-03 DIAGNOSIS — K21.9 GASTROESOPHAGEAL REFLUX DISEASE WITHOUT ESOPHAGITIS: ICD-10-CM

## 2024-04-03 DIAGNOSIS — I10 PRIMARY HYPERTENSION: ICD-10-CM

## 2024-04-03 DIAGNOSIS — G47.33 OSA (OBSTRUCTIVE SLEEP APNEA): ICD-10-CM

## 2024-04-03 DIAGNOSIS — E66.01 MORBID OBESITY (HCC): ICD-10-CM

## 2024-04-03 DIAGNOSIS — E03.9 ACQUIRED HYPOTHYROIDISM: ICD-10-CM

## 2024-04-03 DIAGNOSIS — E07.9 THYROID DISEASE NEUROPATHY (HCC): ICD-10-CM

## 2024-04-03 LAB
AMPHETAMINES SERPL QL SCN: POSITIVE
BARBITURATES UR QL: NEGATIVE
BENZODIAZ UR QL: NEGATIVE
COCAINE UR QL: NEGATIVE
ETHANOL EXG-MCNC: 0 MG/DL
EXT PREGNANCY TEST URINE: NEGATIVE
EXT. CONTROL: NORMAL
FENTANYL UR QL SCN: NEGATIVE
HYDROCODONE UR QL SCN: NEGATIVE
METHADONE UR QL: NEGATIVE
OPIATES UR QL SCN: NEGATIVE
OXYCODONE+OXYMORPHONE UR QL SCN: NEGATIVE
PCP UR QL: NEGATIVE
THC UR QL: NEGATIVE

## 2024-04-03 PROCEDURE — 81025 URINE PREGNANCY TEST: CPT | Performed by: FAMILY MEDICINE

## 2024-04-03 PROCEDURE — 82075 ASSAY OF BREATH ETHANOL: CPT | Performed by: FAMILY MEDICINE

## 2024-04-03 PROCEDURE — 99284 EMERGENCY DEPT VISIT MOD MDM: CPT

## 2024-04-03 PROCEDURE — 99285 EMERGENCY DEPT VISIT HI MDM: CPT | Performed by: FAMILY MEDICINE

## 2024-04-03 PROCEDURE — 80307 DRUG TEST PRSMV CHEM ANLYZR: CPT | Performed by: FAMILY MEDICINE

## 2024-04-03 PROCEDURE — H2021 COM WRAP-AROUND SV, 15 MIN: HCPCS

## 2024-04-03 NOTE — LETTER
[facility] EMERGENCY DEPARTMENT  [address] [place] [address]  Dept: [phone]      EMTALA TRANSFER CONSENT    NAME Gita Rendon                                         1985                              MRN [id number]    I have been informed of my rights regarding examination, treatment, and transfer   by Dr. Junior Cunningham DO    Benefits: Continuity of care    Risks: Potential for delay in receiving treatment      Consent for Transfer:  I acknowledge that my medical condition has been evaluated and explained to me by the emergency department physician or other qualified medical person and/or my attending physician, who has recommended that I be transferred to the service of  Accepting Physician: Claudine Izaguirre at Accepting Facility Name, City & State : [facility], [place]. The above potential benefits of such transfer, the potential risks associated with such transfer, and the probable risks of not being transferred have been explained to me, and I fully understand them.  The doctor has explained that, in my case, the benefits of transfer outweigh the risks.  I agree to be transferred.    I authorize the performance of emergency medical procedures and treatments upon me in both transit and upon arrival at the receiving facility.  Additionally, I authorize the release of any and all medical records to the receiving facility and request they be transported with me, if possible.  I understand that the safest mode of transportation during a medical emergency is an ambulance and that the Hospital advocates the use of this mode of transport. Risks of traveling to the receiving facility by car, including absence of medical control, life sustaining equipment, such as oxygen, and medical personnel has been explained to me and I fully understand them.    (NANCY CORRECT BOX BELOW)  [ X ]  I consent to the stated transfer and to be transported by ambulance/helicopter.  [  ]  I  consent to the stated transfer, but refuse transportation by ambulance and accept full responsibility for my transportation by car.  I understand the risks of non-ambulance transfers and I exonerate the Hospital and its staff from any deterioration in my condition that results from this refusal.    X___________________________________________    DATE  24  TIME________  Signature of patient or legally responsible individual signing on patient behalf           RELATIONSHIP TO PATIENT____SELF_____________________                      Provider Certification    NAME Gita Rendon                                         1985                              MRN [id number]    A medical screening exam was performed on the above named patient.  Based on the examination:    Condition Necessitating Transfer The primary encounter diagnosis was Depression. Diagnoses of Encounter for psychological evaluation, Hyperaldosteronism (HCC), Iliotibial band syndrome of left side, Morbid obesity (HCC), Gastroesophageal reflux disease without esophagitis, Primary hypertension, Vitamin D deficiency, CAIO (obstructive sleep apnea), Thyroid disease neuropathy (HCC), and Acquired hypothyroidism were also pertinent to this visit.    Patient Condition: The patient has been stabilized such that within reasonable medical probability, no material deterioration of the patient condition or the condition of the unborn child(kendall) is likely to result from the transfer    Reason for Transfer: Level of Care needed not available at this facility    Transfer Requirements: [place]   Space available and qualified personnel available for treatment as acknowledged by Barby Serrano [phone]  Agreed to accept transfer and to provide appropriate medical treatment as acknowledged by       Claudine Izaguirre  Appropriate medical records of the examination and treatment of the patient are provided at the time of transfer   STAFF  INITIAL WHEN COMPLETED _IK______  Transfer will be performed by qualified personnel from [place]  and appropriate transfer equipment as required, including the use of necessary and appropriate life support measures.    Provider Certification: I have examined the patient and explained the following risks and benefits of being transferred/refusing transfer to the patient/family:  The patient is stable for psychiatric transfer because they are medically stable, and is protected from harming him/herself or others during transport      Based on these reasonable risks and benefits to the patient and/or the unborn child(kendall), and based upon the information available at the time of the patient’s examination, I certify that the medical benefits reasonably to be expected from the provision of appropriate medical treatments at another medical facility outweigh the increasing risks, if any, to the individual’s medical condition, and in the case of labor to the unborn child, from effecting the transfer.    X____________________________________________ DATE 04/04/24        TIME_______      ORIGINAL - SEND TO MEDICAL RECORDS   COPY - SEND WITH PATIENT DURING TRANSFER

## 2024-04-04 ENCOUNTER — HOSPITAL ENCOUNTER (INPATIENT)
Facility: HOSPITAL | Age: 39
LOS: 13 days | Discharge: HOME | DRG: 881 | End: 2024-04-17
Attending: HOSPITALIST | Admitting: STUDENT IN AN ORGANIZED HEALTH CARE EDUCATION/TRAINING PROGRAM
Payer: COMMERCIAL

## 2024-04-04 VITALS
SYSTOLIC BLOOD PRESSURE: 110 MMHG | OXYGEN SATURATION: 99 % | RESPIRATION RATE: 18 BRPM | DIASTOLIC BLOOD PRESSURE: 64 MMHG | HEART RATE: 70 BPM | TEMPERATURE: 97.5 F

## 2024-04-04 DIAGNOSIS — G63 THYROID DISEASE NEUROPATHY (HCC): ICD-10-CM

## 2024-04-04 DIAGNOSIS — E66.01 MORBID OBESITY (HCC): ICD-10-CM

## 2024-04-04 DIAGNOSIS — E66.01 MORBID (SEVERE) OBESITY DUE TO EXCESS CALORIES (HCC): ICD-10-CM

## 2024-04-04 DIAGNOSIS — M76.32 ILIOTIBIAL BAND SYNDROME OF LEFT SIDE: ICD-10-CM

## 2024-04-04 DIAGNOSIS — G43.709 CHRONIC MIGRAINE WITHOUT AURA WITHOUT STATUS MIGRAINOSUS, NOT INTRACTABLE: ICD-10-CM

## 2024-04-04 DIAGNOSIS — R73.01 IFG (IMPAIRED FASTING GLUCOSE): ICD-10-CM

## 2024-04-04 DIAGNOSIS — E55.9 VITAMIN D DEFICIENCY: ICD-10-CM

## 2024-04-04 DIAGNOSIS — R20.0 NUMBNESS AND TINGLING IN BOTH HANDS: ICD-10-CM

## 2024-04-04 DIAGNOSIS — G93.2 INTRACRANIAL HYPERTENSION: ICD-10-CM

## 2024-04-04 DIAGNOSIS — G43.909 MIGRAINE HEADACHE: ICD-10-CM

## 2024-04-04 DIAGNOSIS — F50.81 BINGE-EATING DISORDER, SEVERE: ICD-10-CM

## 2024-04-04 DIAGNOSIS — R63.5 ABNORMAL WEIGHT GAIN: ICD-10-CM

## 2024-04-04 DIAGNOSIS — J30.9 ALLERGIC RHINITIS, UNSPECIFIED SEASONALITY, UNSPECIFIED TRIGGER: ICD-10-CM

## 2024-04-04 DIAGNOSIS — E26.9 HYPERALDOSTERONISM (HCC): ICD-10-CM

## 2024-04-04 DIAGNOSIS — R20.2 NUMBNESS AND TINGLING IN BOTH HANDS: ICD-10-CM

## 2024-04-04 DIAGNOSIS — K21.9 GASTROESOPHAGEAL REFLUX DISEASE WITHOUT ESOPHAGITIS: ICD-10-CM

## 2024-04-04 DIAGNOSIS — I10 PRIMARY HYPERTENSION: ICD-10-CM

## 2024-04-04 DIAGNOSIS — F42.2 MIXED OBSESSIONAL THOUGHTS AND ACTS: ICD-10-CM

## 2024-04-04 DIAGNOSIS — E03.9 ACQUIRED HYPOTHYROIDISM: ICD-10-CM

## 2024-04-04 DIAGNOSIS — E66.01 CLASS 3 SEVERE OBESITY WITH BODY MASS INDEX (BMI) OF 60.0 TO 69.9 IN ADULT (HCC): ICD-10-CM

## 2024-04-04 DIAGNOSIS — G47.33 OSA (OBSTRUCTIVE SLEEP APNEA): ICD-10-CM

## 2024-04-04 DIAGNOSIS — I10 HYPERTENSION, UNSPECIFIED TYPE: ICD-10-CM

## 2024-04-04 DIAGNOSIS — E07.9 THYROID DISEASE NEUROPATHY (HCC): ICD-10-CM

## 2024-04-04 DIAGNOSIS — F32.A DEPRESSION: Primary | ICD-10-CM

## 2024-04-04 RX ORDER — DIPHENHYDRAMINE HYDROCHLORIDE 50 MG/ML
50 INJECTION INTRAMUSCULAR; INTRAVENOUS EVERY 6 HOURS PRN
Status: CANCELLED | OUTPATIENT
Start: 2024-04-04

## 2024-04-04 RX ORDER — OLANZAPINE 10 MG/2ML
5 INJECTION, POWDER, FOR SOLUTION INTRAMUSCULAR
Status: CANCELLED | OUTPATIENT
Start: 2024-04-04

## 2024-04-04 RX ORDER — ACETAMINOPHEN 325 MG/1
975 TABLET ORAL EVERY 6 HOURS PRN
Status: CANCELLED | OUTPATIENT
Start: 2024-04-04

## 2024-04-04 RX ORDER — GABAPENTIN 300 MG/1
300 CAPSULE ORAL 2 TIMES DAILY
Status: DISCONTINUED | OUTPATIENT
Start: 2024-04-04 | End: 2024-04-04 | Stop reason: HOSPADM

## 2024-04-04 RX ORDER — OLANZAPINE 2.5 MG/1
2.5 TABLET, FILM COATED ORAL
Status: CANCELLED | OUTPATIENT
Start: 2024-04-04

## 2024-04-04 RX ORDER — HYDROXYZINE HYDROCHLORIDE 25 MG/1
25 TABLET, FILM COATED ORAL
Status: CANCELLED | OUTPATIENT
Start: 2024-04-04

## 2024-04-04 RX ORDER — ACETAMINOPHEN 325 MG/1
650 TABLET ORAL EVERY 4 HOURS PRN
Status: CANCELLED | OUTPATIENT
Start: 2024-04-04

## 2024-04-04 RX ORDER — HYDROXYZINE 50 MG/1
100 TABLET, FILM COATED ORAL
Status: CANCELLED | OUTPATIENT
Start: 2024-04-04

## 2024-04-04 RX ORDER — OLANZAPINE 5 MG/1
5 TABLET ORAL
Status: CANCELLED | OUTPATIENT
Start: 2024-04-04

## 2024-04-04 RX ORDER — DIPHENHYDRAMINE HYDROCHLORIDE 50 MG/ML
50 INJECTION INTRAMUSCULAR; INTRAVENOUS EVERY 6 HOURS PRN
Status: DISCONTINUED | OUTPATIENT
Start: 2024-04-04 | End: 2024-04-17 | Stop reason: HOSPADM

## 2024-04-04 RX ORDER — OLANZAPINE 10 MG/2ML
5 INJECTION, POWDER, FOR SOLUTION INTRAMUSCULAR
Status: DISCONTINUED | OUTPATIENT
Start: 2024-04-04 | End: 2024-04-17 | Stop reason: HOSPADM

## 2024-04-04 RX ORDER — ACETAMINOPHEN 325 MG/1
650 TABLET ORAL EVERY 6 HOURS PRN
Status: CANCELLED | OUTPATIENT
Start: 2024-04-04

## 2024-04-04 RX ORDER — OLANZAPINE 10 MG/1
10 TABLET ORAL
Status: CANCELLED | OUTPATIENT
Start: 2024-04-04

## 2024-04-04 RX ORDER — BENZTROPINE MESYLATE 1 MG/1
1 TABLET ORAL EVERY 6 HOURS PRN
Status: DISCONTINUED | OUTPATIENT
Start: 2024-04-04 | End: 2024-04-17 | Stop reason: HOSPADM

## 2024-04-04 RX ORDER — OLANZAPINE 5 MG/1
5 TABLET ORAL
Status: DISCONTINUED | OUTPATIENT
Start: 2024-04-04 | End: 2024-04-17 | Stop reason: HOSPADM

## 2024-04-04 RX ORDER — ACETAZOLAMIDE 250 MG/1
500 TABLET ORAL 2 TIMES DAILY
Status: DISCONTINUED | OUTPATIENT
Start: 2024-04-04 | End: 2024-04-04 | Stop reason: HOSPADM

## 2024-04-04 RX ORDER — ROPINIROLE 2 MG/1
2 TABLET, FILM COATED ORAL
Status: DISCONTINUED | OUTPATIENT
Start: 2024-04-04 | End: 2024-04-04 | Stop reason: HOSPADM

## 2024-04-04 RX ORDER — ACETAMINOPHEN 325 MG/1
650 TABLET ORAL EVERY 4 HOURS PRN
Status: DISCONTINUED | OUTPATIENT
Start: 2024-04-04 | End: 2024-04-17 | Stop reason: HOSPADM

## 2024-04-04 RX ORDER — PROPRANOLOL HYDROCHLORIDE 20 MG/1
20 TABLET ORAL EVERY 12 HOURS SCHEDULED
Status: DISCONTINUED | OUTPATIENT
Start: 2024-04-04 | End: 2024-04-04 | Stop reason: HOSPADM

## 2024-04-04 RX ORDER — ACETAMINOPHEN 325 MG/1
975 TABLET ORAL EVERY 6 HOURS PRN
Status: DISCONTINUED | OUTPATIENT
Start: 2024-04-04 | End: 2024-04-17 | Stop reason: HOSPADM

## 2024-04-04 RX ORDER — TRAZODONE HYDROCHLORIDE 100 MG/1
100 TABLET ORAL
Status: DISCONTINUED | OUTPATIENT
Start: 2024-04-04 | End: 2024-04-17 | Stop reason: HOSPADM

## 2024-04-04 RX ORDER — MAGNESIUM HYDROXIDE/ALUMINUM HYDROXICE/SIMETHICONE 120; 1200; 1200 MG/30ML; MG/30ML; MG/30ML
30 SUSPENSION ORAL EVERY 4 HOURS PRN
Status: CANCELLED | OUTPATIENT
Start: 2024-04-04

## 2024-04-04 RX ORDER — MAGNESIUM HYDROXIDE/ALUMINUM HYDROXICE/SIMETHICONE 120; 1200; 1200 MG/30ML; MG/30ML; MG/30ML
30 SUSPENSION ORAL EVERY 4 HOURS PRN
Status: DISCONTINUED | OUTPATIENT
Start: 2024-04-04 | End: 2024-04-17 | Stop reason: HOSPADM

## 2024-04-04 RX ORDER — POLYETHYLENE GLYCOL 3350 17 G/17G
17 POWDER, FOR SOLUTION ORAL DAILY PRN
Status: CANCELLED | OUTPATIENT
Start: 2024-04-04

## 2024-04-04 RX ORDER — BENZTROPINE MESYLATE 0.5 MG/1
1 TABLET ORAL EVERY 6 HOURS PRN
Status: CANCELLED | OUTPATIENT
Start: 2024-04-04

## 2024-04-04 RX ORDER — OLANZAPINE 10 MG/2ML
10 INJECTION, POWDER, FOR SOLUTION INTRAMUSCULAR
Status: DISCONTINUED | OUTPATIENT
Start: 2024-04-04 | End: 2024-04-17 | Stop reason: HOSPADM

## 2024-04-04 RX ORDER — OLANZAPINE 10 MG/1
10 TABLET ORAL
Status: DISCONTINUED | OUTPATIENT
Start: 2024-04-04 | End: 2024-04-17 | Stop reason: HOSPADM

## 2024-04-04 RX ORDER — HYDROXYZINE 50 MG/1
50 TABLET, FILM COATED ORAL
Status: CANCELLED | OUTPATIENT
Start: 2024-04-04

## 2024-04-04 RX ORDER — TRAZODONE HYDROCHLORIDE 50 MG/1
100 TABLET ORAL
Status: CANCELLED | OUTPATIENT
Start: 2024-04-04

## 2024-04-04 RX ORDER — POLYETHYLENE GLYCOL 3350 17 G/17G
17 POWDER, FOR SOLUTION ORAL DAILY PRN
Status: DISCONTINUED | OUTPATIENT
Start: 2024-04-04 | End: 2024-04-17 | Stop reason: HOSPADM

## 2024-04-04 RX ORDER — ACETAMINOPHEN 325 MG/1
650 TABLET ORAL EVERY 6 HOURS PRN
Status: DISCONTINUED | OUTPATIENT
Start: 2024-04-04 | End: 2024-04-17 | Stop reason: HOSPADM

## 2024-04-04 RX ORDER — PANTOPRAZOLE SODIUM 40 MG/1
40 TABLET, DELAYED RELEASE ORAL
Status: DISCONTINUED | OUTPATIENT
Start: 2024-04-04 | End: 2024-04-04 | Stop reason: HOSPADM

## 2024-04-04 RX ORDER — HYDROXYZINE 50 MG/1
50 TABLET, FILM COATED ORAL
Status: DISCONTINUED | OUTPATIENT
Start: 2024-04-04 | End: 2024-04-17 | Stop reason: HOSPADM

## 2024-04-04 RX ORDER — OLANZAPINE 10 MG/2ML
10 INJECTION, POWDER, FOR SOLUTION INTRAMUSCULAR
Status: CANCELLED | OUTPATIENT
Start: 2024-04-04

## 2024-04-04 RX ORDER — HYDROXYZINE HYDROCHLORIDE 25 MG/1
25 TABLET, FILM COATED ORAL
Status: DISCONTINUED | OUTPATIENT
Start: 2024-04-04 | End: 2024-04-17 | Stop reason: HOSPADM

## 2024-04-04 RX ORDER — HYDROXYZINE 50 MG/1
100 TABLET, FILM COATED ORAL
Status: DISCONTINUED | OUTPATIENT
Start: 2024-04-04 | End: 2024-04-17 | Stop reason: HOSPADM

## 2024-04-04 RX ORDER — LORAZEPAM 2 MG/ML
2 INJECTION INTRAMUSCULAR EVERY 6 HOURS PRN
Status: CANCELLED | OUTPATIENT
Start: 2024-04-04

## 2024-04-04 RX ORDER — OLANZAPINE 2.5 MG/1
2.5 TABLET, FILM COATED ORAL
Status: DISCONTINUED | OUTPATIENT
Start: 2024-04-04 | End: 2024-04-17 | Stop reason: HOSPADM

## 2024-04-04 RX ORDER — FOLIC ACID 1 MG/1
1 TABLET ORAL DAILY
Status: DISCONTINUED | OUTPATIENT
Start: 2024-04-04 | End: 2024-04-04 | Stop reason: HOSPADM

## 2024-04-04 RX ORDER — SPIRONOLACTONE 25 MG/1
25 TABLET ORAL DAILY
Status: DISCONTINUED | OUTPATIENT
Start: 2024-04-04 | End: 2024-04-04 | Stop reason: HOSPADM

## 2024-04-04 RX ORDER — LORAZEPAM 2 MG/ML
2 INJECTION INTRAMUSCULAR EVERY 6 HOURS PRN
Status: DISCONTINUED | OUTPATIENT
Start: 2024-04-04 | End: 2024-04-17 | Stop reason: HOSPADM

## 2024-04-04 RX ADMIN — LEVOTHYROXINE SODIUM 125 MCG: 25 TABLET ORAL at 10:18

## 2024-04-04 RX ADMIN — GABAPENTIN 300 MG: 300 CAPSULE ORAL at 10:19

## 2024-04-04 RX ADMIN — FOLIC ACID 1 MG: 1 TABLET ORAL at 10:19

## 2024-04-04 RX ADMIN — VORTIOXETINE 10 MG: 10 TABLET, FILM COATED ORAL at 10:18

## 2024-04-04 RX ADMIN — Medication 1 TABLET: at 10:19

## 2024-04-04 RX ADMIN — SPIRONOLACTONE 25 MG: 25 TABLET ORAL at 10:18

## 2024-04-04 RX ADMIN — PANTOPRAZOLE SODIUM 40 MG: 40 TABLET, DELAYED RELEASE ORAL at 10:18

## 2024-04-04 RX ADMIN — TOPIRAMATE 150 MG: 25 TABLET, FILM COATED ORAL at 17:56

## 2024-04-04 RX ADMIN — ACETAZOLAMIDE 500 MG: 250 TABLET ORAL at 17:56

## 2024-04-04 RX ADMIN — METFORMIN HYDROCHLORIDE 500 MG: 500 TABLET ORAL at 10:18

## 2024-04-04 RX ADMIN — PANTOPRAZOLE SODIUM 40 MG: 40 TABLET, DELAYED RELEASE ORAL at 17:22

## 2024-04-04 RX ADMIN — ACETAZOLAMIDE 500 MG: 250 TABLET ORAL at 10:18

## 2024-04-04 RX ADMIN — Medication 5000 UNITS: at 10:19

## 2024-04-04 RX ADMIN — GABAPENTIN 300 MG: 300 CAPSULE ORAL at 17:56

## 2024-04-04 RX ADMIN — TOPIRAMATE 150 MG: 25 TABLET, FILM COATED ORAL at 11:16

## 2024-04-04 NOTE — ED PROVIDER NOTES
History  Chief Complaint   Patient presents with    Psychiatric Evaluation     Feeling depressed for a few weeks, wants to sign 201       Psychiatric Evaluation  Presenting symptoms: no suicidal thoughts    Associated symptoms: no abdominal pain, no chest pain and no headaches      This is a 38-year-old female with a history of a depression presented to ED with complaint of worsening depression over few weeks.  Patient states she is looking to sign in 201.  Seen at walk-in center and was sent to the ED for inpatient psychiatric admission.  Patient says she has been into psychiatric facility multiple times.  Patient states that she is going through a lot.  States she used to depend on her father who passed away financially and is running out of money.  States that she is on disability.  Denies any suicidal homicidal ideation  Prior to Admission Medications   Prescriptions Last Dose Informant Patient Reported? Taking?   Auvelity  MG TBCR  Self Yes No   Sig: Take 1 tablet by mouth 2 (two) times a day   Patient not taking: Reported on 2024   Cequa 0.09 % SOLN  Self No No   Sig: Apply 1 drop to eye 2 (two) times a day   Erenumab-aooe (Aimovig) 140 MG/ML SOAJ  Self Yes No   Sig: Inject 140 mg under the skin every 28 days   LORazepam (ATIVAN) 0.5 mg tablet  Self Yes No   Sig: Take 0.5 mg by mouth 2 (two) times a day as needed for anxiety   Multiple Vitamin (MULTIVITAMIN) capsule  Self Yes No   Sig: Take 1 capsule by mouth daily   Semaglutide-Weight Management (WEGOVY) 1.7 MG/0.75ML  Self No No   Sig: Inject 0.75 mL (1.7 mg total) under the skin once a week   Trintellix 5 MG tablet  Self Yes No   Sig: Take 10 mg by mouth daily   Vraylar 6 MG capsule  Self Yes No   Sig: Take 6 mg by mouth daily Daily in the morning   Vyvanse 70 MG capsule  Self Yes No   Sig: Take 70 mg by mouth daily   acetaZOLAMIDE (DIAMOX) 250 mg tablet  Self No No   Si tablets by mouth twice a day   cariprazine (Vraylar) 3 MG capsule  Self  Yes No   Sig: Take 3 mg by mouth daily at bedtime   celecoxib (CeleBREX) 200 mg capsule  Self No No   Sig: take 1 capsule by mouth twice a day   cholecalciferol (VITAMIN D3) 1,000 units tablet  Self Yes No   Sig: Take 5,000 Units by mouth daily   ciprofloxacin (CIPRO) 500 mg tablet   No No   Sig: Take 1 tablet (500 mg total) by mouth 2 (two) times a day for 5 days   folic acid (FOLVITE) 1 mg tablet  Self Yes No   Sig: Take 1 mg by mouth daily   gabapentin (NEURONTIN) 300 mg capsule  Self Yes No   Sig: Take 300 mg by mouth 2 (two) times a day Once in the AM and once in the PM.   levothyroxine 125 mcg tablet  Self No No   Sig: Take 1 tablet (125 mcg total) by mouth daily in the early morning   linaCLOtide (Linzess) 290 MCG CAPS  Self No No   Sig: Take 1 capsule by mouth daily   loratadine (CLARITIN) 10 mg tablet  Self No No   Sig: Take 1 tablet (10 mg total) by mouth daily   loteprednol etabonate (LOTEMAX) 0.5 % ophthalmic suspension  Self Yes No   metFORMIN (GLUCOPHAGE) 500 mg tablet  Self No No   Sig: Take 1 tablet (500 mg total) by mouth daily with breakfast   metroNIDAZOLE (FLAGYL) 500 mg tablet   No No   Sig: Take 1 tablet (500 mg total) by mouth every 8 (eight) hours for 5 days   omeprazole (PriLOSEC) 20 mg delayed release capsule  Self No No   Sig: take 1 capsule by mouth twice a day   propranolol (INDERAL) 20 mg tablet  Self Yes No   Sig: Take 20 mg by mouth every 12 (twelve) hours   rOPINIRole (REQUIP) 2 mg tablet  Self No No   Sig: Take 1 tablet (2 mg total) by mouth daily at bedtime   rimegepant sulfate (Nurtec) 75 mg TBDP  Self No No   Sig: TAKE 75MG BY MOUTH EVERY OTHER DAY. IF THERE IS A MIGRAINE ON A NON-NURTEC DAY ,YOU CAN TAKE A DOSE AND SKIP THE FOLLOWING DAY   Patient not taking: Reported on 3/28/2024   rizatriptan (MAXALT) 10 mg tablet  Self No No   Sig: Take 1 tablet (10 mg total) by mouth once as needed for migraine May repeat every 2 hours if needed, max 30mg/24 hours   Patient not taking:  Reported on 3/28/2024   spironolactone (ALDACTONE) 25 mg tablet  Self No No   Sig: Take 1 tablet (25 mg total) by mouth daily   topiramate (TOPAMAX) 100 mg tablet  Self No No   Sig: take 1 AND 1/2 tablets by mouth twice a day      Facility-Administered Medications: None       Past Medical History:   Diagnosis Date    Allergic     Allergic rhinitis     Anorexia nervosa in remission     Anxiety     Arthritis 5/8/2014    Back pain     Bipolar disorder (HCC)     Depression     Dermatitis 10/16/2021    Disease of thyroid gland     Diverticulitis of colon 9/20/2015    Dizziness     Ear problems     Eating disorder     Esophageal reflux 08/15/2013    GERD (gastroesophageal reflux disease) 8/15/2013    Headache(784.0)     Headache, tension-type     Hypertension     Hypothyroid     Idiopathic intracranial hypertension     Lumbar degenerative disc disease 05/08/2014    Migraine     Nasal congestion     Nosebleed     Obesity     Obsessive-compulsive disorder     Otitis media     Overactive bladder     Psychiatric disorder     Restless leg syndrome, controlled     Seasonal allergies     Sinusitis     Sleep apnea     Sleep difficulties     Suicide and self-inflicted injury (HCC)     Tinnitus     TMJ dysfunction     Tonsillitis     Transcranial Magnetic Stimulation 09/06/2021    Urinary tract infection     Vision loss        Past Surgical History:   Procedure Laterality Date    DENTAL SURGERY      wisdom teeth-at [age] years. Other surgery dental extraxtion of bone spur (10 years ago)    IR LUMBAR PUNCTURE  02/26/2019    SINUS ENDOSCOPY      SINUS SURGERY      TONSILLECTOMY         Family History   Problem Relation Age of Onset    Mental illness Mother     Depression Mother     Suicide Attempts Mother     Hypertension Mother     Diabetes Mother     Other Mother         bicuspid aortic valve    Anxiety disorder Mother     Psychiatric Illness Mother     Schizoaffective Disorder  Mother     Anemia Mother     Hypertension Father      Hypothyroidism Father     Thyroid disease Father     Hypertension Brother     Cancer Maternal Grandmother     Heart disease Maternal Grandmother     Stroke Maternal Grandmother     Breast cancer Maternal Grandmother     Skin cancer Maternal Grandmother     Arthritis Maternal Grandmother     Pneumonia Maternal Grandfather     Cancer Maternal Grandfather     Dementia Maternal Grandfather     COPD Maternal Grandfather     Cancer Paternal Grandmother     Pneumonia Paternal Grandfather     Arthritis Family     Breast cancer Family     Osteopenia Family     Osteoporosis Family     Hypertension Family     Transient ischemic attack Family      I have reviewed and agree with the history as documented.    E-Cigarette/Vaping    E-Cigarette Use Never User      E-Cigarette/Vaping Substances    Nicotine No     THC No     CBD No     Flavoring No     Other No     Unknown No      Social History     Tobacco Use    Smoking status: Never     Passive exposure: Never    Smokeless tobacco: Never   Vaping Use    Vaping status: Never Used   Substance Use Topics    Alcohol use: Never    Drug use: Never       Review of Systems   Constitutional:  Negative for chills and fever.   HENT:  Negative for rhinorrhea and sore throat.    Eyes:  Negative for visual disturbance.   Respiratory:  Negative for cough and shortness of breath.    Cardiovascular:  Negative for chest pain and leg swelling.   Gastrointestinal:  Negative for abdominal pain, diarrhea, nausea and vomiting.   Genitourinary:  Negative for dysuria.   Musculoskeletal:  Negative for back pain and myalgias.   Skin:  Negative for rash.   Neurological:  Negative for dizziness and headaches.   Psychiatric/Behavioral:  Negative for confusion and suicidal ideas.    All other systems reviewed and are negative.      Physical Exam  Physical Exam  Vitals and nursing note reviewed.   Constitutional:       Appearance: She is well-developed.   HENT:      Head: Normocephalic and atraumatic.      Right  Ear: External ear normal.      Left Ear: External ear normal.      Nose: Nose normal.      Mouth/Throat:      Mouth: Mucous membranes are moist.      Pharynx: No oropharyngeal exudate.   Eyes:      General: No scleral icterus.        Right eye: No discharge.         Left eye: No discharge.      Conjunctiva/sclera: Conjunctivae normal.      Pupils: Pupils are equal, round, and reactive to light.   Cardiovascular:      Rate and Rhythm: Normal rate and regular rhythm.      Pulses: Normal pulses.      Heart sounds: Normal heart sounds.   Pulmonary:      Effort: Pulmonary effort is normal. No respiratory distress.      Breath sounds: Normal breath sounds. No wheezing.   Abdominal:      General: Bowel sounds are normal.      Palpations: Abdomen is soft.   Musculoskeletal:         General: Normal range of motion.      Cervical back: Normal range of motion and neck supple.   Lymphadenopathy:      Cervical: No cervical adenopathy.   Skin:     General: Skin is warm and dry.      Capillary Refill: Capillary refill takes less than 2 seconds.   Neurological:      General: No focal deficit present.      Mental Status: She is alert and oriented to person, place, and time.   Psychiatric:         Mood and Affect: Mood is anxious and depressed.         Thought Content: Thought content is not paranoid or delusional. Thought content does not include homicidal or suicidal ideation. Thought content does not include homicidal or suicidal plan.         Vital Signs  ED Triage Vitals [04/03/24 2222]   Temperature Pulse Respirations Blood Pressure SpO2   97.8 °F (36.6 °C) 61 16 156/80 98 %      Temp Source Heart Rate Source Patient Position - Orthostatic VS BP Location FiO2 (%)   Temporal -- -- -- --      Pain Score       No Pain           Vitals:    04/03/24 2222   BP: 156/80   Pulse: 61         Visual Acuity      ED Medications  Medications - No data to display    Diagnostic Studies  Results Reviewed       Procedure Component Value Units  Date/Time    Rapid drug screen, urine [[id number]]  (Abnormal) Collected: 04/03/24 2245    Lab Status: Final result Specimen: Urine, Clean Catch Updated: 04/03/24 2303     Amph/Meth UR Positive     Barbiturate Ur Negative     Benzodiazepine Urine Negative     Cocaine Urine Negative     Methadone Urine Negative     Opiate Urine Negative     PCP Ur Negative     THC Urine Negative     Oxycodone Urine Negative     Fentanyl Urine Negative     HYDROCODONE URINE Negative    Narrative:      FOR MEDICAL PURPOSES ONLY.   IF CONFIRMATION NEEDED PLEASE CONTACT THE LAB WITHIN 5 DAYS.    Drug Screen Cutoff Levels:  AMPHETAMINE/METHAMPHETAMINES  1000 ng/mL  BARBITURATES     200 ng/mL  BENZODIAZEPINES     200 ng/mL  COCAINE      300 ng/mL  METHADONE      300 ng/mL  OPIATES      300 ng/mL  PHENCYCLIDINE     25 ng/mL  THC       50 ng/mL  OXYCODONE      100 ng/mL  FENTANYL      5 ng/mL  HYDROCODONE     300 ng/mL    POCT pregnancy, urine [[id number]]  (Normal) Resulted: 04/03/24 2243    Lab Status: Final result Specimen: Urine Updated: 04/03/24 2243     EXT Preg Test, Ur Negative     Control Valid    POCT alcohol breath test [[id number]]  (Normal) Resulted: 04/03/24 2243    Lab Status: Final result Updated: 04/03/24 2243     EXTBreath Alcohol 0.00                   No orders to display              Procedures  Procedures         ED Course  ED Course as of 04/04/24 0137   Wed Apr 03, 2024   2347 Pt signed 201     Patient is medically clear for inpatient behavioral health unit admission.                          SBIRT 22yo+      Flowsheet Row Most Recent Value   Initial Alcohol Screen: US AUDIT-C     1. How often do you have a drink containing alcohol? 0 Filed at: 04/03/2024 2229   2. How many drinks containing alcohol do you have on a typical day you are drinking?  0 Filed at: 04/03/2024 2229   3a. Male UNDER 65: How often do you have five or more drinks on one occasion? 0 Filed at: 04/03/2024 2229   3b. FEMALE Any Age, or MALE 65+: How  often do you have 4 or more drinks on one occassion? 0 Filed at: 04/03/2024 2229   Audit-C Score 0 Filed at: 04/03/2024 2229   JENNIFER: How many times in the past year have you...    Used an illegal drug or used a prescription medication for non-medical reasons? Never Filed at: 04/03/2024 2229                      Medical Decision Making  Amount and/or Complexity of Data Reviewed  Labs: ordered.    Risk  Decision regarding hospitalization.             Disposition  Final diagnoses:   Depression   Encounter for psychological evaluation     Time reflects when diagnosis was documented in both MDM as applicable and the Disposition within this note       Time User Action Codes Description Comment    4/4/2024  1:37 AM Brandon Rodriguez [F32.A] Depression     4/4/2024  1:37 AM Brandon Rodriguez [Z00.8] Encounter for psychological evaluation           ED Disposition       ED Disposition   Transfer to Behavioral Health Condition   --    Date/Time   Wed Apr 3, 2024 2229    Comment   Gita CALHOUN Stack should be transferred out to  and has been medically cleared.               Follow-up Information    None         Patient's Medications   Discharge Prescriptions    No medications on file       No discharge procedures on file.    PDMP Review         Value Time User    PDMP Reviewed  Yes 8/7/2023 12:35 PM LEIGHA Kaiser            ED Provider  Electronically Signed by             Brandon Rodriguez MD  04/03/24 7344       Brandon Rodriguez MD  04/04/24 1190

## 2024-04-04 NOTE — ED NOTES
Insurance Authorization for admission:   Phone call placed to Asha.  Phone number: [phone].     Spoke to Shailesh    4 days approved.  Level of care: 201.  Review on 4/8/2024 .   Authorization # [id number] .        [facility]/[facility]- [phone]/ Active with [facility]

## 2024-04-04 NOTE — ED CARE HANDOFF
Emergency Department Sign Out Note        Sign out and transfer of care from Dr. Rodriguez. See Separate Emergency Department note.     The patient, Gita Rendon, was evaluated by the previous provider for depression.    Workup Completed:  Medically cleared. 201 signed prior to arrival.    ED Course / Workup Pending (followup):  201 signed.  Pending placement.                                ED Course as of 04/04/24 0629   Thu Apr 04, 2024   0102 SO - 39 yo F. 201 signed. Pending placement.     Procedures  Medical Decision Making    38 y.o. female presenting for depression.  Calm/cooperative in ED.  Denying SI/HI.    Patient is medically stable for inpatient psychiatric care.  Patient agreeable to 201. 201 form signed and placed in patient chart. No firms grounds for 302.  Patient care transferred to Dr. Cunningham pending placement.    Amount and/or Complexity of Data Reviewed  Labs: ordered.    Risk  OTC drugs.  Prescription drug management.  Decision regarding hospitalization.            Disposition  Final diagnoses:   Depression   Encounter for psychological evaluation     Time reflects when diagnosis was documented in both MDM as applicable and the Disposition within this note       Time User Action Codes Description Comment    4/4/2024  1:37 AM Brandon Rodriguez Add [F32.A] Depression     4/4/2024  1:37 AM Brandon Rodriguez Add [Z00.8] Encounter for psychological evaluation           ED Disposition       ED Disposition   Transfer to Behavioral Health Condition   --    Date/Time   Wed Apr 3, 2024 10:29 PM    Comment   Gita Rendon should be transferred out to  and has been medically cleared.               Follow-up Information    None       Patient's Medications   Discharge Prescriptions    No medications on file     No discharge procedures on file.       ED Provider  Electronically Signed by     Stewart Nichols DO  04/04/24 0629

## 2024-04-04 NOTE — ED NOTES
Pharmacy notified this nurse that patient needs to bring cariprazine (VRAYLAR) from home.      Gertrudis Morejon RN  04/04/24 0604

## 2024-04-04 NOTE — ED NOTES
See previous [place] Suicide Risk Assessment. Re-screening not required unless change in behavior or suicidal ideation.  Behavioral Health Assessment deferred as patient is sleeping and would benefit from additional rest.  Vital signs deferred until patient awake, no signs or symptoms of respiratory distress at this time.    Once patient is awake and able to participate, will complete assessments.      Gertrudis Morejon RN  04/04/24 2434

## 2024-04-04 NOTE — ED NOTES
Patient is sleeping and would benefit from additional rest.  Vital signs deferred until patient awake, no signs or symptoms of respiratory distress at this time.     Once patient is awake and able to participate, will complete assessments.      Stacia Clements RN  04/04/24 7767

## 2024-04-04 NOTE — ED NOTES
With permission from patient, ANAHI Herrera spoke to patients Mom (Cathy) on phone and provided her with an update.           Lynn Martin  04/04/24 1030

## 2024-04-04 NOTE — ED NOTES
See previous [place] Suicide Risk Assessment. Re-screening not required unless change in behavior or suicidal ideation.  Behavioral Health Assessment deferred as patient is sleeping and would benefit from additional rest.  Vital signs deferred until patient awake, no signs or symptoms of respiratory distress at this time.     Once patient is awake and able to participate, will complete assessments.          Stacia Clements RN  04/04/24 5881

## 2024-04-04 NOTE — PROGRESS NOTES
"Assessment      Crisis Intake  Patient Intake  Special Needs: None  Living Arrangement: Apartment (Lives with her ex-girlfriend in shared apartment. Reports safe environment)  Can patient return home?: Yes  Address to be Discharge to:: See facesheet.  Patient's Telephone Number: See facesheet.  Access to Firearms: No  Type of Work: On Disability for   Work History: Disabled  Admission Status  Status of Admission: 201  Patient History  Presenting Problems: Patient presents to the walk-in center seeking an inpatient level of care. Patient was tearful with a flat affect throughout assessment. Patient carries diagnoses of Major Depressive Disorder, Generalized Anxiety Disorder, OCD and PTSD. Patient is taking medication for symptoms but reports she does not see any benefits. Patient reports over the last several months her depression continues to worsen. Patient is unable to get out of bed every day and reports she stays in bed and cries every day. Patient is isolating on a daily basis. Patient voiced she has lost 100 pounds from Wegovy during last year but reports since then, \"I hate myself.\" Patient is having a lot of financial stress as her ex-girlfriend is moving out of the apartment in October. Patient also admits her brother holds her finances by her request as she cannot trust herself with money. Patient has impulsive spending habits and gambling addiction. Patient has history of eating disorder as well. Patient struggling with traumas that are adding to current depressive and anxiety symptoms. Patient saw her mother try to commit suicide when she was 5. Patient was living with her parents  in  when her father contracted covid. Patient reports she went into his room to check on him and found him . Patient's mother spiraled after this and ended up in assistant care home. Patient reports she feels like she lost 'both parents at the same time.' Patient step dad passed away shortly after this as well. " "Patient is not currenly caring for her self and has no motivation. Patient reports \"My depression is incredibly out of control.\" Patient reports having many services but none are helping and she does not know what else to do. Pt sees Dr. Weston for psychiatry and Pushpa Coates for therapy. Patient has an ICM. Patient has history of several inpatient stays. Patient reports history of SIB by cutting. Pt denies SI but states  'there is no way out of this and if I had balls I would not be here anymore.'  No HI, AH/VH, paranoia or delusions. Pt reports one SA in early 20's by overdose. Pt has poor appetite and has to force herself to eat since losing 100 lbs. Patient is also not sleeping well due to racing thoughts. Pt denies substance abuse. Patient feels uncomfortable / unable to contract for safety alone at this time. Patient is seeking an inpatient level of care and requests SLL placement.  Treatment History: Inpatient psych; Therapy; Psychiatry  Currently in Treatment: Yes  Current Psychiatrist/Therapist: Dr. Weston - Psychiatrist ;  Tiara Coates - Therapist  Current Treatment Appt Info: Monthly  Name of ICM:: Claire Hendrickson @ [facility] [place]:: Other  [facility] Phone Number:: U/k  Medical Problems: See history  Legal Issues: None  Substance Abuse: No (Patient does have gambling addiction.)  Mental Status Exam  Orientation Level: Appropriate for age, Oriented X4  Affect: Blunted, Flat  Speech: Slow, Soft, Pressured  Mood: Depressed  Thought Content: Appropriate  Hallucination Type: No problems reported or observed.  Judgement: Fair  Impulse Control: Poor  Attention Span: Easily distracted  Memory: Intact  Appetite: Poor  Weight Changes: Patient lost 100 pounds over a year on Wegovy  Sleep: Fair  Total Hours of Sleep: Unknown  Specific Sleep Problem: Patient reports she never sleeps through the night or gets deep sleep due to constant racing thoughts  Appear/Hygiene: Disheveled  ADL Comments: " "Struggling to take care of self  Strengths and Limitations  Patient Strengths: Cooperative, Reasoning ability, Negotiates basic needs  Patient Limitations: Difficulty adapting, Limited motivation, Financial instability, Poor past treatment response  Ideations  Current Self Harm/Suicidal Ideation: No (Pt does state that she would hurt herself but is to scared to do so at current)  Previous Self Harm/Suicidal Ideation: Yes  Description of self harming behaviors or thoughts:: Patient does state that she feels no way out of the depression and 'If I had balls I wouldn't be here.\"  Violence Risk to Self: Denies ideation within past 6 months  Current homicidal or violent thoughts toward another: Denies ideations within past 6 months  Previous Plans to Harm Another Person: No  Violence Risk to Others: Denies within past 6 months  Previous History of Violence to Others: No  Provisional Diagnosis  Axis I: PTSD (F43.10); MDD (F32.2); EILEEN (F41.1) ; OCD (F42)  Axis II: Deferred  Axis III: Medical Chart  Intake Assessment Outcome  Patient Plan: Inpatient  Current Status:: 201    C-SSRS  [place] Suicide Severity Rating Scale  C-SSRS Q1. Wish to be Dead: Yes - In the Past Month  C-SSRS Q2. Suicidal Thoughts: No - In the Past Month  C-SSRS Q6. Suicide Behavior Question: No  *Risk Level*: Low Risk      Patient was referred by: Self or Family    Visit start and stop times:    04/03/24  Start Time: 2115  Stop Time: 2200  Total Visit Time: 45 minutes        Patient signed a 201 at the [facility]-In Center. Patient was read her rights and appears to be in understanding. Patient was transported to the [place] ED via ambulance. Patient left her personal vehicle in [facility] parking lot (white subaru [employer]) and security made aware.     "

## 2024-04-04 NOTE — ED NOTES
Patient is accepted at [facility]   Patient is accepted by Claudine Izaguirre    Transportation is arranged with [facility].     Transportation is scheduled for 2000  Patient may go to the floor at 1900        Nurse report is to be called to [phone] prior to patient transfer.

## 2024-04-05 LAB
ALBUMIN SERPL BCP-MCNC: 3.6 G/DL (ref 3.5–5)
ALP SERPL-CCNC: 55 U/L (ref 34–104)
ALT SERPL W P-5'-P-CCNC: 16 U/L (ref 7–52)
ANION GAP SERPL CALCULATED.3IONS-SCNC: 8 MMOL/L (ref 4–13)
AST SERPL W P-5'-P-CCNC: 17 U/L (ref 13–39)
ATRIAL RATE: 54 BPM
BASOPHILS # BLD AUTO: 0.07 THOUSANDS/ÂΜL (ref 0–0.1)
BASOPHILS NFR BLD AUTO: 1 % (ref 0–1)
BILIRUB SERPL-MCNC: 0.31 MG/DL (ref 0.2–1)
BUN SERPL-MCNC: 19 MG/DL (ref 5–25)
CALCIUM SERPL-MCNC: 8.7 MG/DL (ref 8.4–10.2)
CHLORIDE SERPL-SCNC: 116 MMOL/L (ref 96–108)
CHOLEST SERPL-MCNC: 126 MG/DL
CO2 SERPL-SCNC: 17 MMOL/L (ref 21–32)
CREAT SERPL-MCNC: 0.64 MG/DL (ref 0.6–1.3)
EOSINOPHIL # BLD AUTO: 0.39 THOUSAND/ÂΜL (ref 0–0.61)
EOSINOPHIL NFR BLD AUTO: 4 % (ref 0–6)
ERYTHROCYTE [DISTWIDTH] IN BLOOD BY AUTOMATED COUNT: 13.4 % (ref 11.6–15.1)
EST. AVERAGE GLUCOSE BLD GHB EST-MCNC: 105 MG/DL
GFR SERPL CREATININE-BSD FRML MDRD: 112 ML/MIN/1.73SQ M
GLUCOSE P FAST SERPL-MCNC: 84 MG/DL (ref 65–99)
GLUCOSE SERPL-MCNC: 84 MG/DL (ref 65–140)
HBA1C MFR BLD: 5.3 %
HCT VFR BLD AUTO: 42.5 % (ref 34.8–46.1)
HDLC SERPL-MCNC: 36 MG/DL
HGB BLD-MCNC: 13.4 G/DL (ref 11.5–15.4)
IMM GRANULOCYTES # BLD AUTO: 0.03 THOUSAND/UL (ref 0–0.2)
IMM GRANULOCYTES NFR BLD AUTO: 0 % (ref 0–2)
LDLC SERPL CALC-MCNC: 72 MG/DL (ref 0–100)
LYMPHOCYTES # BLD AUTO: 3.04 THOUSANDS/ÂΜL (ref 0.6–4.47)
LYMPHOCYTES NFR BLD AUTO: 32 % (ref 14–44)
MCH RBC QN AUTO: 28.6 PG (ref 26.8–34.3)
MCHC RBC AUTO-ENTMCNC: 31.5 G/DL (ref 31.4–37.4)
MCV RBC AUTO: 91 FL (ref 82–98)
MONOCYTES # BLD AUTO: 0.74 THOUSAND/ÂΜL (ref 0.17–1.22)
MONOCYTES NFR BLD AUTO: 8 % (ref 4–12)
NEUTROPHILS # BLD AUTO: 5.28 THOUSANDS/ÂΜL (ref 1.85–7.62)
NEUTS SEG NFR BLD AUTO: 55 % (ref 43–75)
NONHDLC SERPL-MCNC: 90 MG/DL
NRBC BLD AUTO-RTO: 0 /100 WBCS
P AXIS: 45 DEGREES
PLATELET # BLD AUTO: 268 THOUSANDS/UL (ref 149–390)
PMV BLD AUTO: 10.9 FL (ref 8.9–12.7)
POTASSIUM SERPL-SCNC: 3.3 MMOL/L (ref 3.5–5.3)
PR INTERVAL: 176 MS
PROT SERPL-MCNC: 6 G/DL (ref 6.4–8.4)
QRS AXIS: 6 DEGREES
QRSD INTERVAL: 94 MS
QT INTERVAL: 416 MS
QTC INTERVAL: 394 MS
RBC # BLD AUTO: 4.69 MILLION/UL (ref 3.81–5.12)
SODIUM SERPL-SCNC: 141 MMOL/L (ref 135–147)
T WAVE AXIS: 18 DEGREES
T4 FREE SERPL-MCNC: 0.89 NG/DL (ref 0.61–1.12)
TRIGL SERPL-MCNC: 88 MG/DL
TSH SERPL DL<=0.05 MIU/L-ACNC: 0.15 UIU/ML (ref 0.45–4.5)
VENTRICULAR RATE: 54 BPM
WBC # BLD AUTO: 9.55 THOUSAND/UL (ref 4.31–10.16)

## 2024-04-05 PROCEDURE — 93005 ELECTROCARDIOGRAM TRACING: CPT

## 2024-04-05 PROCEDURE — 83036 HEMOGLOBIN GLYCOSYLATED A1C: CPT

## 2024-04-05 PROCEDURE — 80053 COMPREHEN METABOLIC PANEL: CPT | Performed by: NURSE PRACTITIONER

## 2024-04-05 PROCEDURE — 84439 ASSAY OF FREE THYROXINE: CPT | Performed by: NURSE PRACTITIONER

## 2024-04-05 PROCEDURE — 93010 ELECTROCARDIOGRAM REPORT: CPT | Performed by: INTERNAL MEDICINE

## 2024-04-05 PROCEDURE — 85025 COMPLETE CBC W/AUTO DIFF WBC: CPT | Performed by: NURSE PRACTITIONER

## 2024-04-05 PROCEDURE — 80061 LIPID PANEL: CPT | Performed by: NURSE PRACTITIONER

## 2024-04-05 PROCEDURE — 99223 1ST HOSP IP/OBS HIGH 75: CPT | Performed by: STUDENT IN AN ORGANIZED HEALTH CARE EDUCATION/TRAINING PROGRAM

## 2024-04-05 PROCEDURE — 84443 ASSAY THYROID STIM HORMONE: CPT | Performed by: NURSE PRACTITIONER

## 2024-04-05 RX ORDER — DEXTROAMPHETAMINE SACCHARATE, AMPHETAMINE ASPARTATE, DEXTROAMPHETAMINE SULFATE AND AMPHETAMINE SULFATE 2.5; 2.5; 2.5; 2.5 MG/1; MG/1; MG/1; MG/1
30 TABLET ORAL DAILY
Status: DISCONTINUED | OUTPATIENT
Start: 2024-04-05 | End: 2024-04-05

## 2024-04-05 RX ORDER — SUMATRIPTAN 25 MG/1
100 TABLET, FILM COATED ORAL DAILY PRN
Status: DISCONTINUED | OUTPATIENT
Start: 2024-04-05 | End: 2024-04-17 | Stop reason: HOSPADM

## 2024-04-05 RX ORDER — GABAPENTIN 300 MG/1
300 CAPSULE ORAL DAILY
Status: DISCONTINUED | OUTPATIENT
Start: 2024-04-06 | End: 2024-04-08

## 2024-04-05 RX ORDER — LITHIUM CARBONATE 300 MG/1
300 TABLET, FILM COATED, EXTENDED RELEASE ORAL EVERY 12 HOURS SCHEDULED
Status: DISCONTINUED | OUTPATIENT
Start: 2024-04-05 | End: 2024-04-12

## 2024-04-05 RX ORDER — PROPRANOLOL HYDROCHLORIDE 20 MG/1
20 TABLET ORAL EVERY 12 HOURS SCHEDULED
Status: DISCONTINUED | OUTPATIENT
Start: 2024-04-05 | End: 2024-04-06

## 2024-04-05 RX ORDER — GABAPENTIN 600 MG/1
900 TABLET ORAL
Status: ON HOLD | COMMUNITY
Start: 2024-03-29 | End: 2024-04-16

## 2024-04-05 RX ORDER — AMOXICILLIN 250 MG
1 CAPSULE ORAL 2 TIMES DAILY PRN
Status: DISCONTINUED | OUTPATIENT
Start: 2024-04-05 | End: 2024-04-17 | Stop reason: HOSPADM

## 2024-04-05 RX ORDER — SUMATRIPTAN 25 MG/1
50 TABLET, FILM COATED ORAL DAILY PRN
Status: DISCONTINUED | OUTPATIENT
Start: 2024-04-05 | End: 2024-04-05

## 2024-04-05 RX ORDER — CELECOXIB 100 MG/1
200 CAPSULE ORAL 2 TIMES DAILY
Status: DISCONTINUED | OUTPATIENT
Start: 2024-04-05 | End: 2024-04-17 | Stop reason: HOSPADM

## 2024-04-05 RX ORDER — LEVOTHYROXINE SODIUM 112 UG/1
112 TABLET ORAL
Status: DISCONTINUED | OUTPATIENT
Start: 2024-04-06 | End: 2024-04-17 | Stop reason: HOSPADM

## 2024-04-05 RX ORDER — VORTIOXETINE 20 MG/1
20 TABLET, FILM COATED ORAL DAILY
COMMUNITY
Start: 2024-03-28 | End: 2024-04-17

## 2024-04-05 RX ORDER — ACETAZOLAMIDE 250 MG/1
500 TABLET ORAL 2 TIMES DAILY
Status: DISCONTINUED | OUTPATIENT
Start: 2024-04-05 | End: 2024-04-17 | Stop reason: HOSPADM

## 2024-04-05 RX ORDER — DEXTROAMPHETAMINE SACCHARATE, AMPHETAMINE ASPARTATE, DEXTROAMPHETAMINE SULFATE AND AMPHETAMINE SULFATE 2.5; 2.5; 2.5; 2.5 MG/1; MG/1; MG/1; MG/1
30 TABLET ORAL
Status: DISCONTINUED | OUTPATIENT
Start: 2024-04-05 | End: 2024-04-06

## 2024-04-05 RX ORDER — ROPINIROLE 2 MG/1
2 TABLET, FILM COATED ORAL
Status: DISCONTINUED | OUTPATIENT
Start: 2024-04-05 | End: 2024-04-05

## 2024-04-05 RX ORDER — POTASSIUM CHLORIDE 20 MEQ/1
40 TABLET, EXTENDED RELEASE ORAL ONCE
Status: COMPLETED | OUTPATIENT
Start: 2024-04-05 | End: 2024-04-05

## 2024-04-05 RX ORDER — LORATADINE 10 MG/1
10 TABLET ORAL DAILY
Status: DISCONTINUED | OUTPATIENT
Start: 2024-04-05 | End: 2024-04-17 | Stop reason: HOSPADM

## 2024-04-05 RX ORDER — PANTOPRAZOLE SODIUM 40 MG/1
40 TABLET, DELAYED RELEASE ORAL
Status: DISCONTINUED | OUTPATIENT
Start: 2024-04-05 | End: 2024-04-17 | Stop reason: HOSPADM

## 2024-04-05 RX ORDER — GABAPENTIN 300 MG/1
600 CAPSULE ORAL 2 TIMES DAILY
Status: DISCONTINUED | OUTPATIENT
Start: 2024-04-05 | End: 2024-04-05

## 2024-04-05 RX ORDER — SPIRONOLACTONE 25 MG/1
25 TABLET ORAL DAILY
Status: DISCONTINUED | OUTPATIENT
Start: 2024-04-05 | End: 2024-04-17 | Stop reason: HOSPADM

## 2024-04-05 RX ORDER — GABAPENTIN 600 MG/1
900 TABLET ORAL
Status: DISCONTINUED | OUTPATIENT
Start: 2024-04-05 | End: 2024-04-17 | Stop reason: HOSPADM

## 2024-04-05 RX ADMIN — LORATADINE 10 MG: 10 TABLET ORAL at 11:15

## 2024-04-05 RX ADMIN — VORTIOXETINE 20 MG: 10 TABLET, FILM COATED ORAL at 16:40

## 2024-04-05 RX ADMIN — PROPRANOLOL HYDROCHLORIDE 20 MG: 20 TABLET ORAL at 11:15

## 2024-04-05 RX ADMIN — LEVOTHYROXINE SODIUM 125 MCG: 25 TABLET ORAL at 11:15

## 2024-04-05 RX ADMIN — LITHIUM CARBONATE 300 MG: 300 TABLET, EXTENDED RELEASE ORAL at 20:55

## 2024-04-05 RX ADMIN — ACETAZOLAMIDE 500 MG: 250 TABLET ORAL at 20:54

## 2024-04-05 RX ADMIN — PROPRANOLOL HYDROCHLORIDE 20 MG: 20 TABLET ORAL at 21:00

## 2024-04-05 RX ADMIN — B-COMPLEX W/ C & FOLIC ACID TAB 1 TABLET: TAB at 11:18

## 2024-04-05 RX ADMIN — LINACLOTIDE 1 CAPSULE: 290 CAPSULE, GELATIN COATED ORAL at 15:15

## 2024-04-05 RX ADMIN — GABAPENTIN 600 MG: 300 CAPSULE ORAL at 11:15

## 2024-04-05 RX ADMIN — ACETAZOLAMIDE 500 MG: 250 TABLET ORAL at 15:11

## 2024-04-05 RX ADMIN — TOPIRAMATE 150 MG: 25 TABLET, FILM COATED ORAL at 18:03

## 2024-04-05 RX ADMIN — POTASSIUM CHLORIDE 40 MEQ: 1500 TABLET, EXTENDED RELEASE ORAL at 12:42

## 2024-04-05 RX ADMIN — PANTOPRAZOLE SODIUM 40 MG: 40 TABLET, DELAYED RELEASE ORAL at 15:15

## 2024-04-05 RX ADMIN — Medication 5000 UNITS: at 11:17

## 2024-04-05 RX ADMIN — LITHIUM CARBONATE 300 MG: 300 TABLET, EXTENDED RELEASE ORAL at 15:15

## 2024-04-05 RX ADMIN — CYCLOSPORINE 1 DROP: 0 SOLUTION/ DROPS OPHTHALMIC; TOPICAL at 15:14

## 2024-04-05 RX ADMIN — CARIPRAZINE 6 MG: 3 CAPSULE, GELATIN COATED ORAL at 15:13

## 2024-04-05 RX ADMIN — DEXTROAMPHETAMINE SACCHARATE, AMPHETAMINE ASPARTATE, DEXTROAMPHETAMINE SULFATE AND AMPHETAMINE SULFATE 30 MG: 2.5; 2.5; 2.5; 2.5 TABLET ORAL at 15:12

## 2024-04-05 RX ADMIN — TOPIRAMATE 150 MG: 25 TABLET, FILM COATED ORAL at 11:18

## 2024-04-05 RX ADMIN — CYCLOSPORINE 1 DROP: 0 SOLUTION/ DROPS OPHTHALMIC; TOPICAL at 20:53

## 2024-04-05 RX ADMIN — CELECOXIB 200 MG: 100 CAPSULE ORAL at 18:03

## 2024-04-05 RX ADMIN — CELECOXIB 200 MG: 100 CAPSULE ORAL at 11:17

## 2024-04-05 RX ADMIN — GABAPENTIN 900 MG: 600 TABLET, FILM COATED ORAL at 20:52

## 2024-04-05 RX ADMIN — SPIRONOLACTONE 25 MG: 25 TABLET ORAL at 11:15

## 2024-04-05 NOTE — PROGRESS NOTES
04/05/24 1129    Admission Notification   Notification of Admission Provided to: ICM;Friend;Psychiatrist;Therapist   Friend Notified via: Phone call   ICM Notified via: Phone call   Psychiatrist Notified via: Phone call   Therapist Notified via: Phone call

## 2024-04-05 NOTE — SOCIAL WORK
04/05/24   Team Meeting   Meeting Type Daily Rounds   Team Members Present   Team Members Present Physician;Nurse;Occupational Therapist;   Physician Team Member Dr Luis COOL; Zina AHUJA   Nursing Team Member Cassy CHRISTIAN   Social Work Team Member Cole TORRES; Shad MOSS   OT Team Member Katlin SOTELO   Patient/Family Present   Patient Present No   Patient's Family Present No   New admit, 201, worseing dep, grief/loss, UDS pos meth, multiple psych admissions, SSDI, denies active si,

## 2024-04-05 NOTE — PROGRESS NOTES
04/05/24 115   Team Members Present   Team Members Present Physician;Nurse;   Physician Team Member Dr Rogelio Smith MD   Nursing Team Member Karyna Madera RN   Social Work Team Member ISA Queen   Patient/Family Present   Patient Present Yes   Patient's Family Present No   CM reviewed tx plan and goals with tx team. All in agreement and signed.

## 2024-04-05 NOTE — SOCIAL WORK
CM placed call to pt psych and therapist at [facility] [phone]. CM spoke to Lakisha. Psychiatrist is Dr Weston and therapist Pushpa.  Lakisha stated she would notify providers.  CM to secure post d/c appt once d/c date is known.      CM placed call to pt [facility] Claire Shell at [facility] [phone].  CM directed to [facility].      CM placed call to pt roommate Svetlana Turcios [phone]. CM directed to [facility].

## 2024-04-05 NOTE — H&P
"Psychiatric Evaluation - Behavioral Health     Identification Data:Gita Rendon 38 y.o. female MRN: [id number]  Unit/Bed#: U 254-02 Encounter: [id number]    Chief Complaint: \"I wanted to die\" and \"I have bad headaches which affects my mood\"    History of Present Illness     Gita Rendon is a 38 y.o. female with a history of Major Depressive Disorder, Generalized Anxiety Disorder and OCD who was admitted to the inpatient adult psychiatric unit on a voluntary 201 commitment basis due to depression, anxiety and suicidal ideation without plan.    Patient presents to the walk-in center seeking an inpatient level of care. Patient was tearful with a flat affect throughout assessment. Patient carries diagnoses of Major Depressive Disorder, Generalized Anxiety Disorder, OCD and PTSD. Patient is taking medication for symptoms but reports she does not see any benefits. Patient reports over the last several months her depression continues to worsen. Patient is unable to get out of bed every day and reports she stays in bed and cries every day. Patient is isolating on a daily basis. Patient voiced she has lost 100 pounds from Wegovy during last year but reports since then, \"I hate myself.\" Patient is having a lot of financial stress as her ex-girlfriend is moving out of the apartment in October. Patient also admits her brother holds her finances by her request as she cannot trust herself with money. Patient has impulsive spending habits and gambling addiction. Patient has history of eating disorder as well. Patient is not currenly caring for her self and has no motivation. Patient reports \"My depression is incredibly out of control.\" Patient reports having many services but none are helping and she does not know what else to do.  Patient has history of several inpatient stays. Patient reports history of SIB by cutting. Pt denies SI but states 'there is no way out of this and if I had balls I would not be here " anymore.' No HI, AH/VH, paranoia or delusions.  Pt has poor appetite and has to force herself to eat since losing 100 lbs. Patient is also not sleeping well due to racing thoughts. Pt denies substance abuse. Patient feels uncomfortable / unable to contract for safety alone at this time. Patient is seeking an inpatient level of care and requests SLL placemen     Patient was seen and examined today after admission.  She reports being depressed recently after her roommate left her apartment she currently struggles with her finances.  She is not working and her main income comes from disability.  She also reports recently her dog has been sick and she cannot afford treating her dog and considering getting rid of it however she is afraid of losing her emotional support.  She also reports losing a lot of weight in the last year is contributing to her depression now. she is currently seeing Dr. Weston at [facility] and currently prescribed Trintellix 20 mg, cariprazine 3 mg in the morning and 6 at bedtime, gabapentin 300 in the morning and 900 at bedtime, ropinirole 2 mg at bedtime.  She is also taking Vyvanse 70 mg for ADHD. she denies history of bipolar and denies symptoms of psychosis    Psychiatric Review Of Systems:    Sleep changes: yes  Appetite changes:yes  Weight changes: no  Energy: decreased  Interest/pleasure/: decreased  Anhedonia: yes  Anxiety: yes  Rebecca: no  Guilt:  yes  Hopeless:  yes  Self injurious behavior/risky behavior: no  Suicidal ideation: yes, passive death wish  Homicidal ideation: no  Auditory hallucinations: no  Visual hallucinations: no  Delusional thinking: no  Eating disorder history: no  Obsessive/compulsive symptoms: obsessive thoughts    Historical Information     Past Psychiatric History:     Past Inpatient Psychiatric Treatment:  Patient reports frequent inpatient hospitalization between 2008 and 2010, in the last 7 years reports having had 1 hospitalization  Past Outpatient  Psychiatric Treatment:  Pt sees Dr. Weston for psychiatry and Pushpa Coates for therapy. Patient has an ICM.  Past Suicide Attempts: yes, Reports 1 suicide attempt in 2008  Past Violent Behavior: No   past Psychiatric Medication Trials: Wellbutrin, Zoloft, clomipramine, Vraylar Latuda, Topamax, Lamictal, gabapentin, Vyvanse, Adderall     Substance Abuse History:    Social History       Tobacco History       Smoking Status  Never      Passive Exposure  Never      Smokeless Tobacco Use  Never              Alcohol History       Alcohol Use Status  Never              Drug Use       Drug Use Status  Never              Sexual Activity       Sexually Active  Yes Partners  Female Comment  No STDs              Activities of Daily Living    Not Asked                 Additional Substance Use Detail       Questions Responses    Problems Due to Past Use of Alcohol? No    Problems Due to Past Use of Substances? No    Substance Use Assessment Denies substance use within the past 12 months    Alcohol Use Frequency Denies use in past 12 months    Cannabis frequency Never used    Comment:  Never used on 8/3/2023     Heroin Frequency Denies use in past 12 months    Cocaine frequency Never used    Comment:  Never used on 8/3/2023     Crack Cocaine Frequency Denies use in past 12 months    Methamphetamine Frequency Denies use in past 12 months    Narcotic Frequency Denies use in past 12 months    Benzodiazepine Frequency Denies use in past 12 months    Amphetamine frequency Denies use in past 12 months    Barbituate Frequency Denies use use in past 12 months    Inhalant frequency Never used    Comment:  Never used on 8/3/2023     Hallucinogen frequency Never used    Comment:  Never used on 8/3/2023     Ecstasy frequency Never used    Comment:  Never used on 8/3/2023     Other drug frequency Never used    Comment:  Never used on 8/3/2023     Opiate frequency Denies use in past 12 months    Last reviewed by Annamarie Chavarria LPN on  8/3/2023          I have assessed this patient for substance use within the past 12 months    Alcohol use: denies use  Denies illicit substance use    Family Psychiatric History:     Social History:    Education: high school diploma/GED  Marital History: same sex partner  Children: none  Living Arrangement: lives in home with significant other  Occupational History: unemployed  Functioning Relationships: good support system  Legal History: none   History: None    Traumatic History:   Patient struggling with traumas that are adding to current depressive and anxiety symptoms. Patient saw her mother try to commit suicide when she was 5. Patient was living with her parents in  when her father contracted covid. Patient reports she went into his room to check on him and found him . Patient's mother spiraled after this and ended up in assistant care home. Patient reports she feels like she lost 'both parents at the same time.'    Past Medical History:      Past Medical History:   Diagnosis Date    Allergic     Allergic rhinitis     Anorexia nervosa in remission     Anxiety     Arthritis 2014    Back pain     Bipolar disorder (HCC)     Depression     Dermatitis 10/16/2021    Disease of thyroid gland     Diverticulitis of colon 2015    Dizziness     Ear problems     Eating disorder     Esophageal reflux 08/15/2013    GERD (gastroesophageal reflux disease) 8/15/2013    Headache(784.0)     Headache, tension-type     Hypertension     Hypothyroid     Idiopathic intracranial hypertension     Lumbar degenerative disc disease 2014    Migraine     Nasal congestion     Nosebleed     Obesity     Obsessive-compulsive disorder     Otitis media     Overactive bladder     Psychiatric disorder     Restless leg syndrome, controlled     Seasonal allergies     Sinusitis     Sleep apnea     Sleep difficulties     Suicide and self-inflicted injury (HCC)     Tinnitus     TMJ dysfunction     Tonsillitis      Transcranial Magnetic Stimulation 09/06/2021    Urinary tract infection     Vision loss      Past Surgical History:   Procedure Laterality Date    DENTAL SURGERY      wisdom teeth-at [age] years. Other surgery dental extraxtion of bone spur (10 years ago)    IR LUMBAR PUNCTURE  02/26/2019    SINUS ENDOSCOPY      SINUS SURGERY      TONSILLECTOMY         Medical Review Of Systems:    Pertinent items are noted in HPI.  Otherwise negative    Allergies:    Allergies   Allergen Reactions    Doxycycline      Pt refuses d/t remote H/O intracranial hypertension     Other Other (See Comments)     Seasonal allergies       Medications:     All current active medications have been reviewed.    OBJECTIVE:    Vital signs in last 24 hours:    Temp:  [97.5 °F (36.4 °C)-98.5 °F (36.9 °C)] 98.5 °F (36.9 °C)  HR:  [] 62  Resp:  [16-18] 16  BP: ()/(50-82) 102/72    No intake or output data in the 24 hours ending 04/05/24 1703     Mental Status Evaluation:    Appearance:  casually dressed, marginal hygiene, looks stated age   Behavior:  cooperative, restless   Speech:  normal rate, soft   Mood:  dysphoric, anxious   Affect:  blunted   Language: naming objects   Thought Process:  linear   Associations: intact associations   Thought Content:  no overt delusions   Perceptual Disturbances: no auditory hallucinations, no visual hallucinations   Risk Potential: Suicidal ideation - None at present, prior to admission. Denies at present  Homicidal ideation - None  Potential for aggression - No   Sensorium:  oriented to person, place and time/date   Memory:  recent and remote memory grossly intact   Consciousness:  alert and awake   Attention: attention span and concentration are age appropriate   Intellect: within normal limits   Fund of Knowledge: awareness of current events: yes   Insight:  limited   Judgment: limited   Muscle Strength Muscle Tone: normal  normal   Gait/Station: normal gait/station   Motor Activity: no abnormal  movements       Laboratory Results:   I have personally reviewed all pertinent laboratory/tests results.    Imaging Studies:   No results found.    Code Status: Prior  Advance Directive and Living Will: <no information>    Assessment/Plan   Principal Problem:    Depression  Active Problems:    EILEEN (generalized anxiety disorder)    PTSD (post-traumatic stress disorder)  38 years old adult female patient was admitted to inpatient psychiatric unit due to worsening depression and suicidal ideation.  Patient is currently on a regimen of antidepressant and antipsychotic and agitated adding lithium for augmentation.  Patient also can be a good candidate for ECT and TMS in the future as well as intranasal ketamine for resistant depression    Patient Strengths: ability for insight, average or above intelligence, cooperative, compliant with medication, patient is on a voluntary commitment     Patient Barriers: chronic mental illness, poor physical health, poor self-care    Treatment Plan:   Continue Trintellix 20 mg  Lower Vraylar to 6 mg  Started on lithium 300 mg twice a day and check lithium level next week  Continue gabapentin 300 mg in the morning and 900 at bedtime  Switch to Vyvanse to Adderall 30 mg in the a.m. and 30 mg in the afternoon as Vyvanse is not on formulary    Planned Treatment and Medication Changes:  All current active medications have been reviewed  Encourage group therapy, milieu therapy and occupational therapy  Behavioral Health checks every 7 minutes      Risks / Benefits of Treatment:    Risks, benefits, and possible side effects of medications explained to patient including risk of rash related to treatment with Lamictal, risk of kidney impairment related to treatment with Lithium, risk of parkinsonian symptoms, Tardive Dyskinesia and metabolic syndrome related to treatment with antipsychotic medications, risk of cardiovascular events in elderly related to treatment with antipsychotic medications,  risks of misuse, abuse or dependence, sedation and respiratory depression related to treatment with benzodiazepine medications, risks of cardiovascular side effects including elevated blood pressure, risk of misuse, abuse or dependence and risk of increased anxiety related to treatment with stimulant medications, risk of impaired next-day mental alertness, complex sleep-related behavior and dependence related to treatment with hypnotic medications, risks and benefits of treatment with medications in pregnancy, and risks and benefits of treatment with medications in lactation. The patient verbalizes understanding and agreement for treatment.    Counseling / Coordination of Care:    Total floor / unit time spent today 60 minutes. Greater than 50% of total time was spent with the patient and / or family counseling and / or coordination of care. A description of the counseling / coordination of care:   Patient's presentation on admission and proposed treatment plan discussed with treatment team.  Diagnosis, medication changes and treatment plan reviewed with patient.    Inpatient Psychiatric Certification:    Estimated length of stay: 30 midnights      Rgoelio Smith MD 04/05/24   (0) independent

## 2024-04-05 NOTE — NURSING NOTE
Patient  visible on unit .Pleasant and cooperative. Report depression and anxiety increase  due find her Dad who pass in his apartment  in 2021 and her  mother now  in a long-term care facility. Depression  as  improve due to socializing with peer.Anxiety related  to her  dog  being  sick  Schedule PO medication administered as ordered. Cooperative  with mouth checks Denies  HI, SI. Appetite good. Attend group.Continue on safety check

## 2024-04-05 NOTE — PROGRESS NOTES
04/05/24 1128   Referral Data   Referral Reason Psych   County Information   County of Residence [facility]   Readmission Root Cause   30 Day Readmission No   Patient Information   Mental Status Alert   Primary Caregiver Self   Support System Friends;Immediate family   Confucianism/Cultural Requests none identified   Legal Information   Tx Plan Signed  --    Current Status: 201   Activities of Daily Living Prior to Admission   Functional Status Independent   Assistive Device Other (Comment)   Living Arrangement Apartment   Ambulation Independent   Access to Firearms   Access to Firearms No   Income Information   Income Source SSI/SSD   Means of Transportation   Means of Transport to Appts: Drives Self

## 2024-04-05 NOTE — SOCIAL WORK
Psycho Social    CM met with pt to obtain the following info. Pt reports worsening dep due to grief/loss and financial instability.Pt states she believes she will have to file bankruptcy.  Pt states that her dog is older and she worries as his needs are increasing.  Pt's lives with her x-girlfriend and the relationship is supportive.  Pt states she has not felt physically/mentally well in some time. Pt states that she sleeps all day and is on weight loss medication that prevents her from eating.  She believes that not having adequate nutrition is contributing to some of her symptoms.  Pt appears motivated for tx, was calm, pleasant and cooperative.      Current SI: denies  Current HI:denies  AVH:denies  Depression:10/10  Anxiety:8/10  Strengths: cooperative, motivated, pleasant   Stressors/Limitations: finances, grief/loss, relationship   Coping skills: arts/crafts, spend time with dog   HX Mental Health: MDD, anxiety, OCD, hx of anorexic   Past Hospitalizations: last ip stay in Aug   Medication Compliance: pt reports compliance   SA/SI in last 12 months: passive thoughts   HI/violence towards others in last 12 months:denies   Access to Firearms: denies  Hx abuse/trauma: mom struggled with mh and attempted suicide in front of pt, in and out hospitals, childhood was difficult  Family HX Mental Health: mom MDD   Family HX Suicide/Homicide: mom had sa   Family HX Substance Abuse: denies  Family HX Dementia:mother   Substance Abuse: denies  Smoking Cessation:denies  Legal Issues:denies  Marital Status:single  Sexual Preference: bisexual female   Children:denies  Parents: father and step passed 2021 and mom in [facility] and visits   Siblings: brother Vladimir supportive relationship   Pets: dog flores retriever Sandeep, roommate caring for dog  Education HX:some college   Type of Work:[employer], 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    HX: denies   Gnosticist Preference: denies  Cultural needs:none identified on unit    Financial: brother oversees inheritance and pt oversees SSDI   POA/guardianship/advanced: directives: brother POA of inheritance   Pharmacy: [facility]     Housing Stability-Dispo/211: able and willing to return to apartment   Transportation: pt drives, has drivers license   Food Insecurity: denies  Intimate Partner Violence: denies  Utilities: denies    Psychiatrist: Tom Weston signed RUTH  Therapist: Pushpa Santos signed ruth   PCP: ROMARIO Rendon signed ruth  D&A:n/a  Case Management: [facility]   Family Contact: brother and roommate signed ruth

## 2024-04-05 NOTE — PROGRESS NOTES
The patient came in with the following items upon admission.    Assistive Device:    Glasses    Medications:    *the patient medications were giving to nursing upon admission*    Contraband:   BIN # 3    [place] [address]  1 x phone (not cracked)  1 x phone     Remains with patient:  1 x shampoo   1 x conditioner  1 x bar of soap  1 x comb  1 x brush  1 x deodorant  1 x pants  2 x shorts  3 x socks  2 x bras  3 x shirts  4 x underwear    Storage:  Locker [id number]    Misc. Tolietries  Misc. Makeup  2 x bags  1 x black hoodie   3 x tank tops  1 x shorts  2 x socks  3 x underwear  2 x shirts    Security:  BAG # [id number]    1 x wallet  1 x [place] 's license   1 x change of address card  2 x health cards  1 x [place] EBT card  1 x Mastercard   2 x VISA debit     Patient was present and signed the belongings paperwork. Patient did not come in with any cash.

## 2024-04-05 NOTE — NURSING NOTE
Pt admitted to [facility] from [facility] ED on a valid voluntary commitment. Pt denies current SI/HI/AVH. She is oriented X4  Pt currently presents as depressed and anxious. Pt admitted for increasing anxiety and depression due to financial circumstances, limited support system, and chronic medical conditions. Physical assessment completed and unremarkable. Pt was cooperative with the admission process. Admission medications and diet ordered. Pt offered snack and fluids.Oriented to unit. Q7 minute safety checks initiated. CSSRS lifetime is moderate and current is low. Provider notified. Provider notified the PTA med list is complete.

## 2024-04-05 NOTE — PROGRESS NOTES
04/05/24 1129   Patient Intake   Special Needs none known   Living Arrangement Apartment   Can patient return home? Yes   Address to be Discharge to: [address] [place] [address] [address] [place] [address]   Patient's Telephone Number [phone] (A)   Access to Firearms No   Work History Disabled   School Grade/Year 12th  (some college)   Admission Status   Status of Admission 201   Patient History   Presenting Problems worsening dep with si   Treatment History outpatient and inpatient   Currently in Treatment Yes   Current Psychiatrist/Therapist Dr Trista Parra (psych)/Pushpa (therapist)   Current Treatment Appt Info on-going/cm to secure post d/c appt once d/c date is known   Name of [facility]: [facility]   [facility] [place]: [facility]  ([facility])   [facility] Phone Number: [phone]   Medical Problems see h&p   Legal Issues denies   Substance Abuse No

## 2024-04-05 NOTE — H&P
Nata Rendon#  :1985 F  MRN:[id number]    [facility]:[id number]  Adm Date: 2024  8:50 PM   ATT PHY: Sia Alaniz Md  [facility]         Chief Complaint: worsening depression      History of Presenting Illness: Gita Rendon is a(n) 38 y.o. year old female who is admitted to [facility] on 201 voluntary commitment basis.  Patient originally presented to [facility] ED on 4/3/24 with worsening depression.     Patient examined at bedside.  Patient is complaining of a mild headache and fatigue. She denies lightheadedness, dizziness, chest pain, SOB, N/V/D, constipation, urinary symptoms.     Labs from :  Potassium 3.3  TSH 0.153, T4 pending   Vitamin D, B12, and folate pending.     Allergies   Allergen Reactions    Doxycycline      Pt refuses d/t remote H/O intracranial hypertension     Other Other (See Comments)     Seasonal allergies       Current Facility-Administered Medications on File Prior to Encounter   Medication Dose Route Frequency Provider Last Rate Last Admin    [DISCONTINUED] acetaZOLAMIDE (DIAMOX) tablet 500 mg  500 mg Oral BID Stewart Nichols DO   500 mg at 24 175    [DISCONTINUED] cariprazine (VRAYLAR) capsule 3 mg  3 mg Oral HS Stewart Nichols DO        [DISCONTINUED] cariprazine (VRAYLAR) capsule 6 mg  6 mg Oral Daily Stewart Nichols DO        [DISCONTINUED] Cholecalciferol (VITAMIN D3) tablet 5,000 Units  5,000 Units Oral Daily Stewart Nichols DO   5,000 Units at 24 1019    [DISCONTINUED] folic acid (FOLVITE) tablet 1 mg  1 mg Oral Daily Stewart Nichols DO   1 mg at 24 1019    [DISCONTINUED] gabapentin (NEURONTIN) capsule 300 mg  300 mg Oral BID Stewart Nichols DO   300 mg at 24 1756    [DISCONTINUED] levothyroxine tablet 125 mcg  125 mcg Oral Early Morning Stewart Nichols DO   125 mcg at 24 1018    [DISCONTINUED] metFORMIN (GLUCOPHAGE) tablet 500 mg  500 mg  Oral Daily With Breakfast Stewart Nichols DO   500 mg at 04/04/24 1018    [DISCONTINUED] multivitamin-minerals (CENTRUM) tablet 1 tablet  1 tablet Oral Daily Stewart Nichols DO   1 tablet at 04/04/24 1019    [DISCONTINUED] pantoprazole (PROTONIX) EC tablet 40 mg  40 mg Oral BID AC Stewart Nichols DO   40 mg at 04/04/24 1722    [DISCONTINUED] propranolol (INDERAL) tablet 20 mg  20 mg Oral Q12H YFN Stewart Nichols DO        [DISCONTINUED] rOPINIRole (REQUIP) tablet 2 mg  2 mg Oral HS Stewart Nichols DO        [DISCONTINUED] spironolactone (ALDACTONE) tablet 25 mg  25 mg Oral Daily Stewart Nichols DO   25 mg at 04/04/24 1018    [DISCONTINUED] topiramate (TOPAMAX) tablet 150 mg  150 mg Oral BID Stewart Nichols DO   150 mg at 04/04/24 1756    [DISCONTINUED] vortioxetine (TRINTELLIX) tablet 10 mg  10 mg Oral Daily Stewart Nichols DO   10 mg at 04/04/24 1018     Current Outpatient Medications on File Prior to Encounter   Medication Sig Dispense Refill    acetaZOLAMIDE (DIAMOX) 250 mg tablet 2 tablets by mouth twice a day 360 tablet 2    cariprazine (Vraylar) 3 MG capsule Take 3 mg by mouth daily at bedtime      celecoxib (CeleBREX) 200 mg capsule take 1 capsule by mouth twice a day 60 capsule 1    Cequa 0.09 % SOLN Apply 1 drop to eye 2 (two) times a day 60 each 0    cholecalciferol (VITAMIN D3) 1,000 units tablet Take 5,000 Units by mouth daily      Erenumab-aooe (Aimovig) 140 MG/ML SOAJ Inject 140 mg under the skin every 28 days      gabapentin (NEURONTIN) 300 mg capsule Take 600 mg by mouth 2 (two) times a day Once in the AM and once in the PM.      levothyroxine 125 mcg tablet Take 1 tablet (125 mcg total) by mouth daily in the early morning 90 tablet 1    linaCLOtide (Linzess) 290 MCG CAPS Take 1 capsule by mouth daily 90 capsule 1    loratadine (CLARITIN) 10 mg tablet Take 1 tablet (10 mg total) by mouth daily 30 tablet 0    loteprednol etabonate (LOTEMAX) 0.5 % ophthalmic suspension       metFORMIN  (GLUCOPHAGE) 500 mg tablet Take 1 tablet (500 mg total) by mouth daily with breakfast 90 tablet 3    Multiple Vitamin (MULTIVITAMIN) capsule Take 1 capsule by mouth daily      omeprazole (PriLOSEC) 20 mg delayed release capsule take 1 capsule by mouth twice a day 180 capsule 0    propranolol (INDERAL) 20 mg tablet Take 20 mg by mouth every 12 (twelve) hours      rOPINIRole (REQUIP) 2 mg tablet Take 1 tablet (2 mg total) by mouth daily at bedtime 30 tablet 0    Semaglutide-Weight Management (WEGOVY) 1.7 MG/0.75ML Inject 0.75 mL (1.7 mg total) under the skin once a week 3 mL 1    spironolactone (ALDACTONE) 25 mg tablet Take 1 tablet (25 mg total) by mouth daily 90 tablet 1    topiramate (TOPAMAX) 100 mg tablet take 1 AND 1/2 tablets by mouth twice a day 270 tablet 1    Trintellix 5 MG tablet Take 20 mg by mouth daily Pt takes Trintellix in the morning      Vraylar 6 MG capsule Take 6 mg by mouth daily Daily in the morning      Vyvanse 70 MG capsule Take 70 mg by mouth daily      Auvelity  MG TBCR Take 1 tablet by mouth 2 (two) times a day (Patient not taking: Reported on 2/21/2024)      folic acid (FOLVITE) 1 mg tablet Take 1 mg by mouth daily (Patient not taking: Reported on 4/4/2024)      LORazepam (ATIVAN) 0.5 mg tablet Take 0.5 mg by mouth 2 (two) times a day as needed for anxiety      rimegepant sulfate (Nurtec) 75 mg TBDP TAKE 75MG BY MOUTH EVERY OTHER DAY. IF THERE IS A MIGRAINE ON A NON-NURTEC DAY ,YOU CAN TAKE A DOSE AND SKIP THE FOLLOWING DAY (Patient not taking: Reported on 3/28/2024) 16 tablet 3    rizatriptan (MAXALT) 10 mg tablet Take 1 tablet (10 mg total) by mouth once as needed for migraine May repeat every 2 hours if needed, max 30mg/24 hours (Patient not taking: Reported on 3/28/2024) 12 tablet 3       Active Ambulatory Problems     Diagnosis Date Noted    Obsessive-compulsive disorder 01/24/2018    Hypertension 01/24/2018    Hypothyroidism 01/24/2018    Overactive bladder 01/24/2018     Binge-eating disorder, severe 09/09/2016    Gambling disorder, moderate 06/15/2015    BMI 50.0-59.9, adult ([facility]) 02/26/2018    Dysfunction of both eustachian tubes 05/20/2018    Bulging lumbar disc 05/08/2014    Esophageal reflux 08/15/2013    EILEEN (generalized anxiety disorder) 11/18/2015    Lumbar degenerative disc disease 05/08/2014    Morbid obesity (HCC) 09/25/2014    Nocturnal enuresis 05/16/2017    PTSD (post-traumatic stress disorder) 10/12/2017    Urgency incontinence 07/06/2017    Vitamin D deficiency 01/15/2014    Chronic midline low back pain without sciatica 07/17/2018    Iliotibial band syndrome of left side 09/04/2018    Piriformis syndrome of left side 09/04/2018    Skin lesion 09/06/2018    IFG (impaired fasting glucose) 11/29/2018    Family history of cancer 01/22/2019    Intracranial hypertension 02/28/2019    Chronic migraine without aura without status migrainosus, not intractable 03/04/2019    Chronic tension-type headache, not intractable 03/04/2019    Menorrhagia with irregular cycle 12/30/2019    Encounter for insertion of mirena IUD 02/10/2020    CAIO (obstructive sleep apnea)     IUD check up 03/06/2020    Class 3 severe obesity with body mass index (BMI) of 60.0 to 69.9 in adult ([facility]) 03/06/2020    Cervicalgia 01/06/2021    Numbness and tingling in both hands 09/24/2021    Dermatitis 10/16/2021    Chronic constipation 10/16/2021    Thyroid disease neuropathy ([facility]) 03/19/2022    Encounter for medication monitoring 07/06/2022    Hypervolemia 07/06/2022    Migraine headache 08/22/2022    Hyperaldosteronism ([facility]) 09/16/2022    Persistent proteinuria 01/05/2023    Depression 08/07/2023    Xerophthalmia 09/21/2023    Xerostomia 09/21/2023    Bilateral lower extremity edema 11/10/2023     Resolved Ambulatory Problems     Diagnosis Date Noted    Bipolar I disorder, most recent episode depressed, severe without psychotic features ([facility]) 01/09/2018    Allergic rhinitis 01/24/2018    Encntr for gyn  exam (general) (routine) w abnormal findings 02/26/2018    Vaginal relaxation 02/26/2018    Routine screening for STI (sexually transmitted infection) 02/26/2018    Amenorrhea 05/17/2012    RLS (restless legs syndrome) 07/21/2016    Irregular bleeding 08/02/2018    Multiple nevi 09/06/2018    Recurrent sinusitis 11/29/2018    Breast nodule 12/04/2018    Chronic headache 02/28/2019    Acute headache 02/28/2019    Visual field defect 02/28/2019    Encntr for gyn exam (general) (routine) w/o abn findings 03/06/2020    Volume overload 10/16/2021    Chronic ethmoidal sinusitis 03/14/2023    Chronic maxillary sinusitis 03/14/2023     Past Medical History:   Diagnosis Date    Allergic     Anorexia nervosa in remission     Anxiety     Arthritis 5/8/2014    Back pain     Bipolar disorder (HCC)     Disease of thyroid gland     Diverticulitis of colon 9/20/2015    Dizziness     Ear problems     Eating disorder     GERD (gastroesophageal reflux disease) 8/15/2013    Headache(784.0)     Headache, tension-type     Hypothyroid     Idiopathic intracranial hypertension     Migraine     Nasal congestion     Nosebleed     Obesity     Otitis media     Psychiatric disorder     Restless leg syndrome, controlled     Seasonal allergies     Sinusitis     Sleep apnea     Sleep difficulties     Suicide and self-inflicted injury (HCC)     Tinnitus     TMJ dysfunction     Tonsillitis     Transcranial Magnetic Stimulation 09/06/2021    Urinary tract infection     Vision loss        Past Surgical History:   Procedure Laterality Date    DENTAL SURGERY      wisdom teeth-at [age] years. Other surgery dental extraxtion of bone spur (10 years ago)    IR LUMBAR PUNCTURE  02/26/2019    SINUS ENDOSCOPY      SINUS SURGERY      TONSILLECTOMY         Social History:   Social History     Socioeconomic History    Marital status: Single     Spouse name: Not on file    Number of children: Not on file    Years of education: Not on file    Highest education  level: Not on file   Occupational History    Occupation: DISABLED   Tobacco Use    Smoking status: Never     Passive exposure: Never    Smokeless tobacco: Never   Vaping Use    Vaping status: Never Used   Substance and Sexual Activity    Alcohol use: Never    Drug use: Never    Sexual activity: Not Currently     Partners: Female     Comment: No STDs   Other Topics Concern    Not on file   Social History Narrative    Always uses seat belts    Caffeine use     Disabled - permament disability since 2008 due to psychiatric illness    Has no children    Single     Tea     Social Determinants of Health     Financial Resource Strain: Medium Risk (3/15/2023)    Overall Financial Resource Strain (CARDIA)     Difficulty of Paying Living Expenses: Somewhat hard   Food Insecurity: No Food Insecurity (10/30/2023)    Received from [facility]    Hunger Vital Sign     Worried About Running Out of Food in the Last Year: Never true     Ran Out of Food in the Last Year: Never true   Transportation Needs: No Transportation Needs (3/15/2023)    PRAPARE - Transportation     Lack of Transportation (Medical): No     Lack of Transportation (Non-Medical): No   Physical Activity: Not on file   Stress: Not on file   Social Connections: Not on file   Intimate Partner Violence: Not on file   Housing Stability: Not on file       Family History:   Family History   Problem Relation Age of Onset    Mental illness Mother     Depression Mother     Suicide Attempts Mother     Hypertension Mother     Diabetes Mother     Other Mother         bicuspid aortic valve    Anxiety disorder Mother     Psychiatric Illness Mother     Schizoaffective Disorder  Mother     Anemia Mother     Hypertension Father     Hypothyroidism Father     Thyroid disease Father     Hypertension Brother     Cancer Maternal Grandmother     Heart disease Maternal Grandmother     Stroke Maternal Grandmother     Breast cancer Maternal Grandmother     Skin cancer Maternal Grandmother      "Arthritis Maternal Grandmother     Pneumonia Maternal Grandfather     Cancer Maternal Grandfather     Dementia Maternal Grandfather     COPD Maternal Grandfather     Cancer Paternal Grandmother     Pneumonia Paternal Grandfather     Arthritis Family     Breast cancer Family     Osteopenia Family     Osteoporosis Family     Hypertension Family     Transient ischemic attack Family        Review of Systems   Constitutional:  Positive for fatigue.   Respiratory:  Negative for cough and shortness of breath.    Cardiovascular:  Negative for chest pain and palpitations.   Gastrointestinal:  Negative for abdominal pain, constipation, diarrhea, nausea and vomiting.   Genitourinary:  Negative for dysuria, frequency and urgency.   Musculoskeletal:  Negative for arthralgias.   Neurological:  Positive for headaches. Negative for dizziness and light-headedness.   Psychiatric/Behavioral:  Positive for sleep disturbance.        Physical Exam   Vitals: Blood pressure 90/50, pulse (!) 107, temperature 97.9 °F (36.6 °C), temperature source Temporal, resp. rate 16, height 5' 7\" (1.702 m), weight (!) 148 kg (325 lb 6.4 oz), last menstrual period 03/11/2024, SpO2 96%.,Body mass index is 50.96 kg/m².  Constitutional: Awake, alert, in no acute distress.  Head: Normocephalic and atraumatic.   Mouth/Throat: Oropharynx is clear and moist.    Eyes: Conjunctivae and EOM are normal.   Neck: Neck supple. No thyromegaly present.   Cardiovascular: Normal rate, regular rhythm and normal heart sounds.    Pulmonary/Chest: Effort normal and breath sounds normal.   Abdominal: Soft. Bowel sounds are normal. There is no tenderness. There is no rebound and no guarding.   Neurological: No focal deficits.   Musculoskeletal:  Nontender spine.  Skin: Skin is warm and dry. No edema.     Assessment   Gita Rendon is a(n) 37 y.o. female with MDD.     1.  Hypothyroidism. TSH low 0.153 4/5/24. T4 pending. Decrease levothyroxine to 112 mcg. Follow up with PCP " and repeat TSH in 6 weeks.   2.  Constipation.  Continue Linzess 290 mcg daily (brought from home). Miralax and Senokot S as needed.  3.  Allergic rhinitis.  Claritin 10 mg daily.  4.  GERD.  Protonix 40 mg daily (omeprazole nonformulary).  5.  Chronic headaches.  Continue Topamax 150 mg twice daily, propranolol 20 mg twice daily, Imitrex 100 mg as needed.   6.  Chronic back pain.  Patient is on gabapentin 600 mg twice daily, Celebrex 200 mg twice daily.    7.  Dry eyes.  Continue cyclosporine and Lotemax eye drops (brought from home).  8.  Morbid obesity/prediabetes.  Continue metformin 500 mg daily.  Hgb a1c 6.1% on 8/4/2023. Patient taking Wygovy every Wednesday non formulary. Will see if someone can bring from home. Repeat A1c.  9.  Hyperaldosteronism.  Patient on spironolactone 25 mg daily.   10. Intracranial HTN. Patient on Diamox 250 mg daily.  11. Vitamin D deficiency. Continue daily supplement.   12. Hypokalemia. Patient's potassium 3.3 on 4/5. One time dose of potassium chloride 40 mEq. Repeat BMP.   13. Psych with mood disorder. As per psych team.     Prognosis: Fair.    Discharge Plan: In progress.    Advanced Directives: I have discussed in detail with the patient the advanced directives. The patient does not have an appointed POA or living will at this time. Emergency contact is brother, Vladimir Rendon, [phone]. When discussing cardiac and pulmonary resuscitation efforts with the patient, the patient wishes to be  FULL CODE.    I have spent more than 50 minutes gathering data, doing physical examination, and discussing the advanced directives, which was witnessed by caring staff.    The patient was discussed with Dr. Liao and he is in agreement with the above note.

## 2024-04-05 NOTE — NUTRITION
04/05/24 1221   Biochemical Data,Medical Tests, and Procedures   Biochemical Data/Medical Tests/Procedures Meds reviewed;Lab values reviewed   Labs (Comment) 4/5/24: WNL   Meds (Comment) MVI, vitamin D3, levothyroxine, protonix, spironolactone, metformin, miralax (PRN), maalox (PRN)   Nutrition-Focused Physical Exam   Nutrition-Focused Physical Exam Findings RN skin assessment reviewed;No skin issues documented;Edema   Nutrition-Focused Physical Exam Findings RUE and LLE trace edema present per Flowsheet documentation.   Medical-Related Concerns Bilateral LE edema, xerostomia, xerophthalmia, depression, OCD, PTSD, anxiety, binge eating, constipation, CAIO, and vitamin D deficiency   Adequacy of Intake   Nutrition Modality PO   Feeding Route   PO Independent   Current PO Intake   Current Diet Order Regular   Current Meal Intake 0-25%;Inadequate   Estimated calorie intake compared to estimated need Estimate nutrient needs are not met   PES Statement   Problem Intake   Oral or Nutritional Support Intake (2) Inadequate oral intake NI-2.1   Related to Depression;Nausea   As evidenced by: I/O's;Lethargy;Weight loss;Depressed appetite;Intake < 25%   Recommendations/Interventions   Malnutrition/BMI Present Yes   Provider already documenting morbid obesity Yes   Adult BMI Classifications Morbid Obesity 50-59.9   Summary High BMI, por mst 1. Pt presents with depression and anxiety. PMH significant for bilateral LE edema, xerostomia, xerophthalmia, depression, OCD, PTSD, anxiety, disordered eating (binge and anorexia in the past), constipation, CAIO, vitamin D deficiency. Weight history reviewed. Significant weight loss of 54 lbs, 13.9% body weight, in 6 months. Regular Diet. I/O 25%. Anticipate nutrient needs are not met. RUE and LLE trace edema present per Flowsheet documentation. No pressure wounds. Pt states appetite is poor in hospital as well as at home. She typically only eats 1 meal - either lunch or dinner, and no  snacks. States she feels nauseous constantly since she is on Wegovy. States Ensures also makes her nauseous and is not interested in consuming these. Denies difficulty with chewing or swallowing. Denies vomiting, diarrhea, or constipation. Grocery shops and cooks for self. Orders groceries online and gets them delivered, but hasn't felt like cooking much so she orders takeout. Recommend continuing diet as prescribed and monitoring I/Os as patient is at risk for malnutrition. Nutrition services will follow.   Interventions/Recommendations Monitor I & O's   Education Assessment   Education Patient/caregiver not appropriate for education at this time   Patient Nutrition Goals   Goal Adequate intake;Improve quality of diet;Meet PO needs   Goal Status Initiated   Timeframe to complete goal by next f/u   Nutrition Complexity Risk   Nutrition complexity level High risk   Follow up date 04/12/24

## 2024-04-05 NOTE — PROGRESS NOTES
04/05/24 1149   Housing Stability   In the last 12 months, was there a time when you were not able to pay the mortgage or rent on time? N   In the last 12 months, how many places have you lived? 2   In the last 12 months, was there a time when you did not have a steady place to sleep or slept in a shelter (including now)? N   Transportation Needs   In the past 12 months, has lack of transportation kept you from medical appointments or from getting medications? no   In the past 12 months, has lack of transportation kept you from meetings, work, or from getting things needed for daily living? No   Food Insecurity   Within the past 12 months, you worried that your food would run out before you got the money to buy more. Never true   Within the past 12 months, the food you bought just didn't last and you didn't have money to get more. Never true   Intimate Partner Violence   Within the last year, have you been afraid of your partner or ex-partner? No   Within the last year, have you been humiliated or emotionally abused in other ways by your partner or ex-partner? No   Within the last year, have you been kicked, hit, slapped, or otherwise physically hurt by your partner or ex-partner? No   Within the last year, have you been raped or forced to have any kind of sexual activity by your partner or ex-partner? No   Utilities   In the past 12 months has the electric, gas, oil, or water company threatened to shut off services in your home? No

## 2024-04-06 LAB
25(OH)D3 SERPL-MCNC: 72.5 NG/ML (ref 30–100)
ANION GAP SERPL CALCULATED.3IONS-SCNC: 6 MMOL/L (ref 4–13)
BUN SERPL-MCNC: 17 MG/DL (ref 5–25)
CALCIUM SERPL-MCNC: 8.9 MG/DL (ref 8.4–10.2)
CHLORIDE SERPL-SCNC: 114 MMOL/L (ref 96–108)
CO2 SERPL-SCNC: 18 MMOL/L (ref 21–32)
CREAT SERPL-MCNC: 0.72 MG/DL (ref 0.6–1.3)
FOLATE SERPL-MCNC: >22.3 NG/ML
GFR SERPL CREATININE-BSD FRML MDRD: 105 ML/MIN/1.73SQ M
GLUCOSE P FAST SERPL-MCNC: 93 MG/DL (ref 65–99)
GLUCOSE SERPL-MCNC: 93 MG/DL (ref 65–140)
POTASSIUM SERPL-SCNC: 3.8 MMOL/L (ref 3.5–5.3)
SODIUM SERPL-SCNC: 138 MMOL/L (ref 135–147)
VIT B12 SERPL-MCNC: 422 PG/ML (ref 180–914)

## 2024-04-06 PROCEDURE — 82306 VITAMIN D 25 HYDROXY: CPT | Performed by: STUDENT IN AN ORGANIZED HEALTH CARE EDUCATION/TRAINING PROGRAM

## 2024-04-06 PROCEDURE — 99232 SBSQ HOSP IP/OBS MODERATE 35: CPT

## 2024-04-06 PROCEDURE — 82746 ASSAY OF FOLIC ACID SERUM: CPT | Performed by: STUDENT IN AN ORGANIZED HEALTH CARE EDUCATION/TRAINING PROGRAM

## 2024-04-06 PROCEDURE — 82607 VITAMIN B-12: CPT | Performed by: STUDENT IN AN ORGANIZED HEALTH CARE EDUCATION/TRAINING PROGRAM

## 2024-04-06 PROCEDURE — 80048 BASIC METABOLIC PNL TOTAL CA: CPT | Performed by: STUDENT IN AN ORGANIZED HEALTH CARE EDUCATION/TRAINING PROGRAM

## 2024-04-06 RX ORDER — PROPRANOLOL HYDROCHLORIDE 20 MG/1
20 TABLET ORAL DAILY
Status: DISCONTINUED | OUTPATIENT
Start: 2024-04-07 | End: 2024-04-17 | Stop reason: HOSPADM

## 2024-04-06 RX ORDER — DEXTROAMPHETAMINE SACCHARATE, AMPHETAMINE ASPARTATE, DEXTROAMPHETAMINE SULFATE AND AMPHETAMINE SULFATE 2.5; 2.5; 2.5; 2.5 MG/1; MG/1; MG/1; MG/1
10 TABLET ORAL
Status: DISCONTINUED | OUTPATIENT
Start: 2024-04-06 | End: 2024-04-07

## 2024-04-06 RX ADMIN — PROPRANOLOL HYDROCHLORIDE 20 MG: 20 TABLET ORAL at 08:18

## 2024-04-06 RX ADMIN — LORATADINE 10 MG: 10 TABLET ORAL at 08:10

## 2024-04-06 RX ADMIN — LEVOTHYROXINE SODIUM 112 MCG: 112 TABLET ORAL at 05:26

## 2024-04-06 RX ADMIN — CYCLOSPORINE 1 DROP: 0 SOLUTION/ DROPS OPHTHALMIC; TOPICAL at 21:00

## 2024-04-06 RX ADMIN — LITHIUM CARBONATE 300 MG: 300 TABLET, EXTENDED RELEASE ORAL at 20:59

## 2024-04-06 RX ADMIN — METFORMIN HYDROCHLORIDE 500 MG: 500 TABLET ORAL at 08:09

## 2024-04-06 RX ADMIN — CYCLOSPORINE 1 DROP: 0 SOLUTION/ DROPS OPHTHALMIC; TOPICAL at 08:15

## 2024-04-06 RX ADMIN — ACETAZOLAMIDE 500 MG: 250 TABLET ORAL at 08:14

## 2024-04-06 RX ADMIN — B-COMPLEX W/ C & FOLIC ACID TAB 1 TABLET: TAB at 08:10

## 2024-04-06 RX ADMIN — PANTOPRAZOLE SODIUM 40 MG: 40 TABLET, DELAYED RELEASE ORAL at 16:52

## 2024-04-06 RX ADMIN — ACETAZOLAMIDE 500 MG: 250 TABLET ORAL at 18:19

## 2024-04-06 RX ADMIN — CELECOXIB 200 MG: 100 CAPSULE ORAL at 08:09

## 2024-04-06 RX ADMIN — DEXTROAMPHETAMINE SACCHARATE, AMPHETAMINE ASPARTATE, DEXTROAMPHETAMINE SULFATE AND AMPHETAMINE SULFATE 30 MG: 2.5; 2.5; 2.5; 2.5 TABLET ORAL at 08:10

## 2024-04-06 RX ADMIN — TRAZODONE HYDROCHLORIDE 100 MG: 100 TABLET ORAL at 22:45

## 2024-04-06 RX ADMIN — LITHIUM CARBONATE 300 MG: 300 TABLET, EXTENDED RELEASE ORAL at 08:09

## 2024-04-06 RX ADMIN — Medication 5000 UNITS: at 08:09

## 2024-04-06 RX ADMIN — CELECOXIB 200 MG: 100 CAPSULE ORAL at 18:17

## 2024-04-06 RX ADMIN — TOPIRAMATE 150 MG: 25 TABLET, FILM COATED ORAL at 08:10

## 2024-04-06 RX ADMIN — LINACLOTIDE 1 CAPSULE: 290 CAPSULE, GELATIN COATED ORAL at 08:15

## 2024-04-06 RX ADMIN — TOPIRAMATE 150 MG: 25 TABLET, FILM COATED ORAL at 18:17

## 2024-04-06 RX ADMIN — PANTOPRAZOLE SODIUM 40 MG: 40 TABLET, DELAYED RELEASE ORAL at 08:09

## 2024-04-06 RX ADMIN — DEXTROAMPHETAMINE SACCHARATE, AMPHETAMINE ASPARTATE, DEXTROAMPHETAMINE SULFATE AND AMPHETAMINE SULFATE 10 MG: 2.5; 2.5; 2.5; 2.5 TABLET ORAL at 14:02

## 2024-04-06 RX ADMIN — CARIPRAZINE 6 MG: 3 CAPSULE, GELATIN COATED ORAL at 08:10

## 2024-04-06 RX ADMIN — VORTIOXETINE 20 MG: 10 TABLET, FILM COATED ORAL at 08:19

## 2024-04-06 RX ADMIN — SPIRONOLACTONE 25 MG: 25 TABLET ORAL at 08:11

## 2024-04-06 RX ADMIN — GABAPENTIN 300 MG: 300 CAPSULE ORAL at 08:10

## 2024-04-06 NOTE — NURSING NOTE
Patient visible in dayroom, social with staff and peers. Patient pleasant and cooperative. Denies SI,HI,AVH. Does endorse depression and anxiety. Safety checks continue Q 7 minutes.

## 2024-04-06 NOTE — PROGRESS NOTES
"Progress Note - Gita Rendon 38 y.o. female MRN: [id number]    Unit/Bed#: U 254-02 Encounter: [id number]        Subjective:   Patient seen and examined at bedside after reviewing the chart and discussing the case with the caring staff.      Patient examined at bedside.  Patient is having some neck and hip pain. She believes it is due to the beds here. Reviewed labs with patient.     Labs pending.     Physical Exam   Vitals: Blood pressure 98/50, pulse 62, temperature 98.8 °F (37.1 °C), temperature source Temporal, resp. rate 16, height 5' 7\" (1.702 m), weight (!) 148 kg (325 lb 6.4 oz), last menstrual period 03/11/2024, SpO2 93%.,Body mass index is 50.96 kg/m².  Constitutional: Patient in no acute distress.  HEENT: PERR, EOMI, MMM.  Cardiovascular: Normal rate and regular rhythm.    Pulmonary/Chest: Effort normal and breath sounds normal.   Abdomen: Soft, + BS, NT.    Assessment/Plan:  Gita Rendon is a(n) 37 y.o. female with MDD.     1.  Hypothyroidism. TSH low 0.153 4/5/24. T4 pending. Decrease levothyroxine to 112 mcg. Follow up with PCP and repeat TSH in 6 weeks.   2.  Constipation.  Continue Linzess 290 mcg daily (brought from home). Miralax and Senokot S as needed.  3.  Allergic rhinitis.  Claritin 10 mg daily.  4.  GERD.  Protonix 40 mg daily (omeprazole nonformulary).  5.  Chronic headaches.  Continue Topamax 150 mg twice daily, propranolol 20 mg twice daily, Imitrex 100 mg as needed.   6.  Chronic back pain.  Patient is on gabapentin 600 mg twice daily, Celebrex 200 mg twice daily.  Patient may use extra pillow for sleep.   7.  Dry eyes.  Continue cyclosporine and Lotemax eye drops (brought from home).  8.  Morbid obesity/prediabetes.  Continue metformin 500 mg daily.  Hgb a1c 6.1% on 8/4/2023. Patient taking Wygovy every Wednesday non formulary. Will see if someone can bring from home. Repeat A1c.  9.  Hyperaldosteronism.  Patient on spironolactone 25 mg daily.   10. Intracranial HTN. Patient on " Diamox 250 mg daily.  11. Vitamin D deficiency. Continue daily supplement.   12. Hypokalemia. Patient's potassium 3.3 on 4/5. One time dose of potassium chloride 40 mEq. Repeat BMP normal, K 3.8 4/6/24.     The patient was discussed with Dr. Liao and he is in agreement with the above note.

## 2024-04-06 NOTE — PROGRESS NOTES
"Progress Note - Behavioral Health   Gita Rendon 38 y.o. female MRN: [id number]  Unit/Bed#: [facility] 254-02 Encounter: [id number]    Assessment/Plan   Principal Problem:    Depression  Active Problems:    EILEEN (generalized anxiety disorder)    PTSD (post-traumatic stress disorder)      Recommended Treatment:   Continue Vraylar 6 mg daily for mood stabilization  Continue Gabapentin 300 mg daily and 900 mg QHS for anxiety and pain  Continue Lithium 300 mg BID for mood stabilization  Continue Topamax 150 mg BID for mood stabilization  Continue Trintellix 20 mg daily for mood and anxiety  Decrease Adderall to 10 mg BID for ADHD related symptoms  Patient expressed interest in reduction of stimulant medication, was on Vyvanse 30 mg at home prior to admission  Continue with group therapy, milieu therapy and occupational therapy.    Continue frequent safety checks and vitals per unit protocol.  Continue medication management per SLIM as indicated.  Admissions labs reviewed - [facility] from 04/06/24, CBC w/diff, HgbA1c, T4 WNL  HDL decreased at 36, TSH decreased at 0.153  EKG demonstrated NSR, QT/QTc WNL  Case discussed with treatment team.  Discharge disposition: TBD  Legal status: 201    Risks, benefits and possible side effects of Medications: Risks, benefits, and possible side effects of medications have previously been explained. No new medications at this time.    ------------------------------------------------------------    Subjective: Per nursing report, Gita has been cooperative on the unit and compliant with medications. Patient was visible on the unit and endorses depressive and anxiety related symptoms. No PRN medications required yesterday.    Patient seen and evaluated for continuity of care.  Today, Gita is consenting for safety on the unit. She reports feeling, \"depressed, okay,\" and that she is \"worrying about home,\" notably her dog. The patient notes the depression is, \"not so bad,\" as the day she " "presented to the emergency room and discusses how it is influence greatly by her anxiety levels.  Patient notes appetite and sleep are improving.  Patient denies suicidal ideation, homicidal ideation, thoughts to harm self or others, or passive death wishes at time of evaluation.  Patient discusses ways to decrease stimulant medication, she is concerned that it \"has not helped,\" over the last 2 years and may be contributing to anxiety.  Discussed with patient decreasing Adderall during this admission-the patient was on Vyvanse prior to admission though this is not on hospital formulary.  Patient agreeable to plan.  Denies adverse effects of other medications at time of evaluation.    Progress Toward Goals: slow improvement    Psychiatric Review of Systems:  Behavior over the last 24 hours:  improving  Sleep: improving  Appetite: adequate, decreased from baseline  Medication side effects: none verbalized  ROS: Complete review of systems is negative except as noted above.    Vital signs in last 24 hours:  Temp:  [98.5 °F (36.9 °C)-98.8 °F (37.1 °C)] 98.8 °F (37.1 °C)  HR:  [62-86] 62  Resp:  [16] 16  BP: ()/(50-82) 98/50    Mental Status Exam:  Appearance:  alert, good eye contact, appears stated age, casually dressed, appropriate grooming and hygiene, obese, and wearing glasses   Behavior:  calm, cooperative, and laying in bed   Motor: no abnormal movements and normal gait and balance   Speech:  spontaneous, clear, normal rate, not pressured, normal volume, hyperverbal, and coherent   Mood:  \"Depressed, okay\"   Affect:  mood-congruent and constricted   Thought Process:  Organized, logical, goal-directed, intact associations, perseverative on home at times   Thought Content: no verbalized delusions or overt paranoia, ruminating thoughts, negative thoughts   Perceptual disturbances: no reported hallucinations and does not appear to be responding to internal stimuli at this time   Risk Potential: No active or " passive suicidal or homicidal ideation was verbalized during interview   Cognition: oriented to person, place, time, and situation, memory grossly intact, appears to be of average intelligence, and age-appropriate attention span and concentration   Insight:  Improving   Judgment: Improving     Current Medications:  Current Facility-Administered Medications   Medication Dose Route Frequency Provider Last Rate    acetaminophen  650 mg Oral Q6H PRN Pachecoaled Ethan Smith MD      acetaminophen  650 mg Oral Q4H PRN Rogelio Smith MD      acetaminophen  975 mg Oral Q6H PRN Pachecoaled Ethan Smith MD      acetaZOLAMIDE  500 mg Oral BID Pachecoaled Ethan Smith MD      aluminum-magnesium hydroxide-simethicone  30 mL Oral Q4H PRN Rogelio Smith MD      amphetamine-dextroamphetamine  30 mg Oral BID (AM & Afternoon) Rogelio Smith MD      benztropine  1 mg Oral Q6H PRN Rogelio Smith MD      cariprazine  6 mg Oral Daily Pachecoaled Ethan Smith MD      celecoxib  200 mg Oral BID Pachecoaled Ethan Smith MD      Cholecalciferol  5,000 Units Oral Daily Rogelio Smith MD      cycloSPORINE (PF)  1 drop Ophthalmic BID Rogelio Smith MD      hydrOXYzine HCL  50 mg Oral Q6H PRN Max 4/day Rogelio Smith MD      Or    diphenhydrAMINE  50 mg Intramuscular Q6H PRN Rogelio Smith MD      gabapentin  300 mg Oral Daily Pachecoaled Ethan Smith MD      gabapentin  900 mg Oral HS Rogelio Smith MD      hydrOXYzine HCL  100 mg Oral Q6H PRN Max 4/day Rogelio Smith MD      Or    LORazepam  2 mg Intramuscular Q6H PRN Rogelio Smith MD      hydrOXYzine HCL  25 mg Oral Q6H PRN Max 4/day Rogelio Smith MD      levothyroxine  112 mcg Oral Early Morning Pachecoalecristian Smith MD      linaCLOtide  290 mcg Oral Daily Pachecoalecristian Long  Fredis Smith MD      lithium carbonate  300 mg Oral Q12H YFN Pachecoaled Ethan Smith MD      loratadine  10 mg Oral Daily Pachecoaled Ethan Smith MD      metFORMIN  500 mg Oral Daily With Breakfast Rogelio Smith MD      multivitamin stress formula  1 tablet Oral Daily Pachecoalecristian Smith MD      NON FORMULARY  1 drop Both Eyes BID aPchecoalecristian Smith MD      OLANZapine  10 mg Oral Q3H PRN Max 3/day Pachecoaled Ethan Smith MD      Or    OLANZapine  10 mg Intramuscular Q3H PRN Max 3/day Pachecoaled Ethan Smith MD      OLANZapine  5 mg Oral Q3H PRN Max 6/day Pachecoaled Ethan Smith MD      Or    OLANZapine  5 mg Intramuscular Q3H PRN Max 6/day Pachecoaled Ethan Smith MD      OLANZapine  2.5 mg Oral Q3H PRN Max 8/day Pachecoaled Ethan Smith MD      pantoprazole  40 mg Oral BID AC Pachecoaled Ethan Smith MD      polyethylene glycol  17 g Oral Daily PRN Pachecoaled Ethan Smith MD      propranolol  20 mg Oral Q12H YFN Pachecoaled Ethan Smith MD      senna-docusate sodium  1 tablet Oral BID PRN Pachecoaled Ethan Smith MD      spironolactone  25 mg Oral Daily Pachecoaled Ethan Smith MD      SUMAtriptan  100 mg Oral Daily PRN Pachecoaled Ethan Smith MD      topiramate  150 mg Oral BID Pachecoaled Ethan Smith MD      traZODone  100 mg Oral HS PRN Pachecoaled Ethan Smith MD      Vortioxetine HBr  20 mg Oral Daily Pachecoaled Ethan Smith MD         Behavioral Health Medications: all current active meds have been reviewed. Changes as in plan section above.    Laboratory results:  I have personally reviewed all pertinent laboratory/tests results.  Recent Results (from the past 48 hour(s))   Comprehensive metabolic panel    Collection Time: 04/05/24  5:31 AM   Result Value Ref Range    Sodium 141 135 - 147 mmol/L    Potassium 3.3 (L) 3.5 - 5.3 mmol/L    Chloride 116 (H) 96 - 108 mmol/L    CO2 17 (L)  21 - 32 mmol/L    ANION GAP 8 4 - 13 mmol/L    BUN 19 5 - 25 mg/dL    Creatinine 0.64 0.60 - 1.30 mg/dL    Glucose 84 65 - 140 mg/dL    Glucose, Fasting 84 65 - 99 mg/dL    Calcium 8.7 8.4 - 10.2 mg/dL    AST 17 13 - 39 U/L    ALT 16 7 - 52 U/L    Alkaline Phosphatase 55 34 - 104 U/L    Total Protein 6.0 (L) 6.4 - 8.4 g/dL    Albumin 3.6 3.5 - 5.0 g/dL    Total Bilirubin 0.31 0.20 - 1.00 mg/dL    eGFR 112 ml/min/1.73sq m   CBC and differential    Collection Time: 04/05/24  5:31 AM   Result Value Ref Range    WBC 9.55 4.31 - 10.16 Thousand/uL    RBC 4.69 3.81 - 5.12 Million/uL    Hemoglobin 13.4 11.5 - 15.4 g/dL    Hematocrit 42.5 34.8 - 46.1 %    MCV 91 82 - 98 fL    MCH 28.6 26.8 - 34.3 pg    MCHC 31.5 31.4 - 37.4 g/dL    RDW 13.4 11.6 - 15.1 %    MPV 10.9 8.9 - 12.7 fL    Platelets 268 149 - 390 Thousands/uL    nRBC 0 /100 WBCs    Neutrophils Relative 55 43 - 75 %    Immature Grans % 0 0 - 2 %    Lymphocytes Relative 32 14 - 44 %    Monocytes Relative 8 4 - 12 %    Eosinophils Relative 4 0 - 6 %    Basophils Relative 1 0 - 1 %    Neutrophils Absolute 5.28 1.85 - 7.62 Thousands/µL    Absolute Immature Grans 0.03 0.00 - 0.20 Thousand/uL    Absolute Lymphocytes 3.04 0.60 - 4.47 Thousands/µL    Absolute Monocytes 0.74 0.17 - 1.22 Thousand/µL    Eosinophils Absolute 0.39 0.00 - 0.61 Thousand/µL    Basophils Absolute 0.07 0.00 - 0.10 Thousands/µL   TSH, 3rd generation with Free T4 reflex    Collection Time: 04/05/24  5:31 AM   Result Value Ref Range    TSH 3RD GENERATON 0.153 (L) 0.450 - 4.500 uIU/mL   Lipid panel    Collection Time: 04/05/24  5:31 AM   Result Value Ref Range    Cholesterol 126 See Comment mg/dL    Triglycerides 88 See Comment mg/dL    HDL, Direct 36 (L) >=50 mg/dL    LDL Calculated 72 0 - 100 mg/dL    Non-HDL-Chol (CHOL-HDL) 90 mg/dl   T4, free    Collection Time: 04/05/24  5:31 AM   Result Value Ref Range    Free T4 0.89 0.61 - 1.12 ng/dL   Hemoglobin A1C w/ EAG Estimation    Collection Time: 04/05/24   5:31 AM   Result Value Ref Range    Hemoglobin A1C 5.3 Normal 4.0-5.6%; PreDiabetic 5.7-6.4%; Diabetic >=6.5%; Glycemic control for adults with diabetes <7.0% %     mg/dl   ECG 12 lead    Collection Time: 04/05/24  3:45 PM   Result Value Ref Range    Ventricular Rate 54 BPM    Atrial Rate 54 BPM    GA Interval 176 ms    QRSD Interval 94 ms    QT Interval 416 ms    QTC Interval 394 ms    P Axis 45 degrees    QRS Axis 6 degrees    T Wave Axis 18 degrees   Basic metabolic panel    Collection Time: 04/06/24  5:55 AM   Result Value Ref Range    Sodium 138 135 - 147 mmol/L    Potassium 3.8 3.5 - 5.3 mmol/L    Chloride 114 (H) 96 - 108 mmol/L    CO2 18 (L) 21 - 32 mmol/L    ANION GAP 6 4 - 13 mmol/L    BUN 17 5 - 25 mg/dL    Creatinine 0.72 0.60 - 1.30 mg/dL    Glucose 93 65 - 140 mg/dL    Glucose, Fasting 93 65 - 99 mg/dL    Calcium 8.9 8.4 - 10.2 mg/dL    eGFR 105 ml/min/1.73sq m        Cecelia Carmichael DO  Psychiatry Resident, PGY-III

## 2024-04-06 NOTE — NURSING NOTE
Patient visible on unit .Flat affect. Depression is  improving while  being  in patient because she's not  alone.Social with peers.Pleasant and cooperative.Schedule PO medication administered as ordered. Still  worried  about  her sick dog.Nurse provide emotional support.Denies hallucination,HI,SI.Appetite good.Attend group.Continue on safety check

## 2024-04-06 NOTE — PLAN OF CARE
Problem: Risk for Self Injury/Neglect  Goal: Treatment Goal: Remain safe during length of stay, learn and adopt new coping skills, and be free of self-injurious ideation, impulses and acts at the time of discharge  Outcome: Progressing  Goal: Refrain from harming self  Description: Interventions:  - Monitor patient closely, per order  - Develop a trusting relationship  - Supervise medication ingestion, monitor effects and side effects   Outcome: Progressing     Problem: Depression  Goal: Treatment Goal: Demonstrate behavioral control of depressive symptoms, verbalize feelings of improved mood/affect, and adopt new coping skills prior to discharge  Outcome: Progressing     Problem: Anxiety  Goal: Anxiety is at manageable level  Description: Interventions:  - Assess and monitor patient's anxiety level.   - Monitor for signs and symptoms (heart palpitations, chest pain, shortness of breath, headaches, nausea, feeling jumpy, restlessness, irritable, apprehensive).   - Collaborate with interdisciplinary team and initiate plan and interventions as ordered.  - [place] patient to unit/surroundings  - Explain treatment plan  - Encourage participation in care  - Encourage verbalization of concerns/fears  - Identify coping mechanisms  - Assist in developing anxiety-reducing skills  - Administer/offer alternative therapies  - Limit or eliminate stimulants  Outcome: Progressing   See chart note

## 2024-04-07 PROCEDURE — 99232 SBSQ HOSP IP/OBS MODERATE 35: CPT

## 2024-04-07 RX ORDER — DEXTROAMPHETAMINE SACCHARATE, AMPHETAMINE ASPARTATE, DEXTROAMPHETAMINE SULFATE AND AMPHETAMINE SULFATE 2.5; 2.5; 2.5; 2.5 MG/1; MG/1; MG/1; MG/1
10 TABLET ORAL DAILY
Status: DISCONTINUED | OUTPATIENT
Start: 2024-04-08 | End: 2024-04-08

## 2024-04-07 RX ADMIN — LITHIUM CARBONATE 300 MG: 300 TABLET, EXTENDED RELEASE ORAL at 20:31

## 2024-04-07 RX ADMIN — DEXTROAMPHETAMINE SACCHARATE, AMPHETAMINE ASPARTATE, DEXTROAMPHETAMINE SULFATE AND AMPHETAMINE SULFATE 10 MG: 2.5; 2.5; 2.5; 2.5 TABLET ORAL at 08:51

## 2024-04-07 RX ADMIN — LORATADINE 10 MG: 10 TABLET ORAL at 08:51

## 2024-04-07 RX ADMIN — LITHIUM CARBONATE 300 MG: 300 TABLET, EXTENDED RELEASE ORAL at 08:52

## 2024-04-07 RX ADMIN — ACETAZOLAMIDE 500 MG: 250 TABLET ORAL at 17:42

## 2024-04-07 RX ADMIN — CYCLOSPORINE 1 DROP: 0 SOLUTION/ DROPS OPHTHALMIC; TOPICAL at 20:29

## 2024-04-07 RX ADMIN — CELECOXIB 200 MG: 100 CAPSULE ORAL at 08:51

## 2024-04-07 RX ADMIN — CARIPRAZINE 6 MG: 3 CAPSULE, GELATIN COATED ORAL at 08:52

## 2024-04-07 RX ADMIN — VORTIOXETINE 20 MG: 10 TABLET, FILM COATED ORAL at 08:55

## 2024-04-07 RX ADMIN — CELECOXIB 200 MG: 100 CAPSULE ORAL at 17:42

## 2024-04-07 RX ADMIN — METFORMIN HYDROCHLORIDE 500 MG: 500 TABLET ORAL at 08:52

## 2024-04-07 RX ADMIN — ACETAZOLAMIDE 500 MG: 250 TABLET ORAL at 08:53

## 2024-04-07 RX ADMIN — B-COMPLEX W/ C & FOLIC ACID TAB 1 TABLET: TAB at 08:52

## 2024-04-07 RX ADMIN — GABAPENTIN 300 MG: 300 CAPSULE ORAL at 08:51

## 2024-04-07 RX ADMIN — PROPRANOLOL HYDROCHLORIDE 20 MG: 20 TABLET ORAL at 20:31

## 2024-04-07 RX ADMIN — TRAZODONE HYDROCHLORIDE 100 MG: 100 TABLET ORAL at 20:36

## 2024-04-07 RX ADMIN — GABAPENTIN 900 MG: 600 TABLET, FILM COATED ORAL at 20:31

## 2024-04-07 RX ADMIN — SPIRONOLACTONE 25 MG: 25 TABLET ORAL at 08:51

## 2024-04-07 RX ADMIN — CYCLOSPORINE 1 DROP: 0 SOLUTION/ DROPS OPHTHALMIC; TOPICAL at 08:53

## 2024-04-07 RX ADMIN — TOPIRAMATE 150 MG: 25 TABLET, FILM COATED ORAL at 08:51

## 2024-04-07 RX ADMIN — TOPIRAMATE 150 MG: 25 TABLET, FILM COATED ORAL at 17:42

## 2024-04-07 RX ADMIN — LINACLOTIDE 1 CAPSULE: 290 CAPSULE, GELATIN COATED ORAL at 08:54

## 2024-04-07 RX ADMIN — LEVOTHYROXINE SODIUM 112 MCG: 112 TABLET ORAL at 05:29

## 2024-04-07 RX ADMIN — PANTOPRAZOLE SODIUM 40 MG: 40 TABLET, DELAYED RELEASE ORAL at 08:56

## 2024-04-07 RX ADMIN — PANTOPRAZOLE SODIUM 40 MG: 40 TABLET, DELAYED RELEASE ORAL at 16:46

## 2024-04-07 RX ADMIN — Medication 5000 UNITS: at 08:51

## 2024-04-07 NOTE — NURSING NOTE
"Patient observed within the milieu amongst peers for most of the evening. She is constricted in affect. Hyperverbal and tangential. She states that she has been losing hope and motivation for months and felt she needed more intensive care. She reports a multitude of stressors including running out of money, her dog's health issues, and pressure from her brother to work. She also feels her out patient psych program isn't helpful either as she feels her cohorts are not on the same intellectual level as her and she feels she needs to be more \"challenged\". She feels her mood has improved since admission and states this is due to socialization. She denies SI/HI and hallucinations. Compliant with medications as ordered. Q7 minute checks ongoing.  "

## 2024-04-07 NOTE — NURSING NOTE
Patient visible on unit.Pleasant and cooperative.Schedule PO medication   administered as ordered. Mild anxiety.Depression  improve being on unit  because  she get to socialize with peers.Denies hallucination, HI, SI.Appetite good. Attend group.Continue on safety check

## 2024-04-07 NOTE — NURSING NOTE
Patient observed asleep/resting throughout a majority of the night during q7 minute checks. Early awakening this morning. Non-labored breathing observed during periods of sleep/rest. Will remain on safety precautions and continual monitoring.

## 2024-04-07 NOTE — PLAN OF CARE
Problem: Risk for Self Injury/Neglect  Goal: Refrain from harming self  Description: Interventions:  - Monitor patient closely, per order  - Develop a trusting relationship  - Supervise medication ingestion, monitor effects and side effects   Outcome: Progressing     Problem: Depression  Goal: Refrain from isolation  Description: Interventions:  - Develop a trusting relationship   - Encourage socialization   Outcome: Progressing

## 2024-04-07 NOTE — PROGRESS NOTES
"Progress Note - Gita Rendon 38 y.o. female MRN: [id number]    Unit/Bed#: [facility] 254-02 Encounter: [id number]        Subjective:   Patient seen and examined at bedside after reviewing the chart and discussing the case with the caring staff.      Patient examined at bedside.  Patient has no acute symptoms.       Physical Exam   Vitals: Blood pressure 109/72, pulse 104, temperature 98.1 °F (36.7 °C), temperature source Temporal, resp. rate 18, height 5' 7\" (1.702 m), weight (!) 149 kg (328 lb 12.8 oz), last menstrual period 03/11/2024, SpO2 96%.,Body mass index is 51.5 kg/m².  Constitutional: Patient in no acute distress.  HEENT: PERR, EOMI, MMM.  Cardiovascular: Normal rate and regular rhythm.    Pulmonary/Chest: Effort normal and breath sounds normal.   Abdomen: Soft, + BS, NT.    Assessment/Plan:  Gita Rendon is a(n) 37 y.o. female with MDD.     1.  Hypothyroidism. TSH low 0.153 4/5/24. T4 pending. Decrease levothyroxine to 112 mcg. Follow up with PCP and repeat TSH in 6 weeks.   2.  Constipation.  Continue Linzess 290 mcg daily (brought from home). Miralax and Senokot S as needed.  3.  Allergic rhinitis.  Claritin 10 mg daily.  4.  GERD.  Protonix 40 mg daily (omeprazole nonformulary).  5.  Chronic headaches.  Continue Topamax 150 mg twice daily, propranolol 20 mg twice daily, Imitrex 100 mg as needed.   6.  Chronic back pain.  Patient is on gabapentin 600 mg twice daily, Celebrex 200 mg twice daily.  Patient may use extra pillow for sleep.   7.  Dry eyes.  Continue cyclosporine and Lotemax eye drops (brought from home).  8.  Morbid obesity/prediabetes.  Continue metformin 500 mg daily.  Hgb a1c 5.3% on 4/5/2024. Patient taking Wygovy every Wednesday non formulary. Will see if someone can bring from home.  9.  Hyperaldosteronism.  Patient on spironolactone 25 mg daily.   10. Intracranial HTN. Patient on Diamox 250 mg daily.  11. Vitamin D deficiency. Continue daily supplement.   12. Hypokalemia. Patient's " potassium 3.3 on 4/5. One time dose of potassium chloride 40 mEq. Repeat BMP normal, K 3.8 4/6/24.     The patient was discussed with Dr. Liao and he is in agreement with the above note.

## 2024-04-07 NOTE — PROGRESS NOTES
Progress Note - Behavioral Health   Gita Rendon 38 y.o. female MRN: [id number]  Unit/Bed#: -02 Encounter: [id number]    Assessment/Plan   Principal Problem:    Depression  Active Problems:    EILEEN (generalized anxiety disorder)    PTSD (post-traumatic stress disorder)      Recommended Treatment:   Continue Vraylar 6 mg daily for mood stabilization  Continue Gabapentin 300 mg daily and 900 mg QHS for anxiety and pain  Continue Lithium 300 mg BID for mood stabilization  Continue Topamax 150 mg BID for mood stabilization  Continue Trintellix 20 mg daily for mood and anxiety  Decrease Adderall to 10 mg daily for ADHD related symptoms  Patient expressed interest in reduction of stimulant medication, was on Vyvanse 30 mg at home prior to admission.  Continue with group therapy, milieu therapy and occupational therapy.    Continue frequent safety checks and vitals per unit protocol.  Continue medication management per SLIM as indicated.  Admissions labs reviewed - Vitamin D, Vitamin B12, and folate WNL  Case discussed with treatment team.  Discharge disposition: TBD  Legal status: 201    Risks, benefits and possible side effects of Medications: Risks, benefits, and possible side effects of medications have previously been explained. No new medications at this time.    ------------------------------------------------------------    Subjective: Per nursing report, Gita has been cooperative on the unit and compliant with medications. Patient has been visible on the unit, affect in decreased range, and endorses continued excessive worry and improving depressive symptoms. Patient required PRN Trazodone 100 mg at 2245 yesterday.    Patient seen and evaluated for continuity of care.  Today, Gita is consenting for safety on the unit. She reports feeling less depressed, though anxiety is persisting. The patient endorses decreased sleep last night requiring Trazodone. She discusses it may be due to change to  "Adderall from Vyvanse and/or not receiving a QHS dose of Vraylar - she endorses she was on 6 mg daily and 3 mg QHS previously, \"which is above the allowed therapeutic limit.\"  Discussed further adjustments to stimulant prescription as patient has expressed goal to decrease stimulant use overall, patient in agreement with plan and change.  The patient reports improving appetite.  Patient denies SI, HI, passive death wishes, o thoughts to harm self or others at time of evaluation.  Patient denies AVH at time of evaluation.    Progress Toward Goals: slow improvement    Psychiatric Review of Systems:  Behavior over the last 24 hours:  improving  Sleep: difficulty falling asleep  Appetite: adequate, improving, decreased from baseline  Medication side effects: none verbalized  ROS: Complete review of systems is negative except as noted above.    Vital signs in last 24 hours:  Temp:  [98.1 °F (36.7 °C)-98.3 °F (36.8 °C)] 98.1 °F (36.7 °C)  HR:  [] 104  Resp:  [16-18] 18  BP: (109-120)/(68-76) 109/72    Mental Status Exam:  Appearance:  alert, good eye contact, appears stated age, casually dressed, appropriate grooming and hygiene, obese, and wearing glasses   Behavior:  calm and cooperative   Motor: no abnormal movements and normal gait and balance   Speech:  spontaneous, clear, normal rate, not pressured, normal volume, hyperverbal, and coherent   Mood:  anxious   Affect:  mood-congruent and constricted   Thought Process:  Organized, logical, goal-directed, intact associations, perseverating thoughts   Thought Content: no verbalized delusions or overt paranoia, ruminating thoughts, negative thoughts   Perceptual disturbances: no reported hallucinations and does not appear to be responding to internal stimuli at this time   Risk Potential: No active or passive suicidal or homicidal ideation was verbalized during interview   Cognition: oriented to person, place, time, and situation, memory grossly intact, appears to " be of average intelligence, and age-appropriate attention span and concentration   Insight:  Improving   Judgment: Improving     Current Medications:  Current Facility-Administered Medications   Medication Dose Route Frequency Provider Last Rate    acetaminophen  650 mg Oral Q6H PRN Pachecoalecristian Smith MD      acetaminophen  650 mg Oral Q4H PRN Rogelio Smith MD      acetaminophen  975 mg Oral Q6H PRN Rogelio Smith MD      acetaZOLAMIDE  500 mg Oral BID Pachecoaled Ethan Smith MD      aluminum-magnesium hydroxide-simethicone  30 mL Oral Q4H PRN Pachecoaled Ethan Smith MD      amphetamine-dextroamphetamine  10 mg Oral BID (AM & Afternoon) Cecelia Carmichael DO      benztropine  1 mg Oral Q6H PRN Pachecoalecristian Smith MD      cariprazine  6 mg Oral Daily Pachecoalecristian Smith MD      celecoxib  200 mg Oral BID Pachecoalecristian Smith MD      Cholecalciferol  5,000 Units Oral Daily Pachecoalecristian Smith MD      cycloSPORINE (PF)  1 drop Ophthalmic BID Pachecoalecristian Smith MD      hydrOXYzine HCL  50 mg Oral Q6H PRN Max 4/day Rogelio Smith MD      Or    diphenhydrAMINE  50 mg Intramuscular Q6H PRN Pachecoalecristian Smith MD      gabapentin  300 mg Oral Daily Rogelio Smith MD      gabapentin  900 mg Oral HS Rogelio Smith MD      hydrOXYzine HCL  100 mg Oral Q6H PRN Max 4/day Rogelio Smith MD      Or    LORazepam  2 mg Intramuscular Q6H PRN Rogelio Smith MD      hydrOXYzine HCL  25 mg Oral Q6H PRN Max 4/day Rogelio Smith MD      levothyroxine  112 mcg Oral Early Morning Pachecoalecristian Smith MD      linaCLOtide  290 mcg Oral Daily Rogelio Smith MD      lithium carbonate  300 mg Oral Q12H YFN Pachecoaled Ethan Smith MD      loratadine  10 mg Oral Daily Rogelio Smith MD      metFORMIN   500 mg Oral Daily With Breakfast Pachecoaled Ethan Smith MD      multivitamin stress formula  1 tablet Oral Daily Pachecoaled Ethan Smith MD      NON FORMULARY  1 drop Both Eyes BID Pachecoaled Ethan Smith MD      OLANZapine  10 mg Oral Q3H PRN Max 3/day Pachecoaled Ethan Smith MD      Or    OLANZapine  10 mg Intramuscular Q3H PRN Max 3/day Lizyd Ethan Smith MD      OLANZapine  5 mg Oral Q3H PRN Max 6/day Pachecoaled Ethan Smith MD      Or    OLANZapine  5 mg Intramuscular Q3H PRN Max 6/day Lizyd Ethan Smith MD      OLANZapine  2.5 mg Oral Q3H PRN Max 8/day Pachecoaled Ethan Smith MD      pantoprazole  40 mg Oral BID AC Pachecoaled Ethan Smith MD      polyethylene glycol  17 g Oral Daily PRN Pachecoaled Ethan Smith MD      propranolol  20 mg Oral Daily Darcy Duran PA-C      senna-docusate sodium  1 tablet Oral BID PRN aled Ethan Smith MD      spironolactone  25 mg Oral Daily Pachecoaled Ethan Smith MD      SUMAtriptan  100 mg Oral Daily PRN Pachecoaled Ethan Smith MD      topiramate  150 mg Oral BID Pachecoaled Ethan Smith MD      traZODone  100 mg Oral HS PRN [facility]      Vortioxetine HBr  20 mg Oral Daily Pachecoaled Ethan Smith MD         Behavioral Health Medications: all current active meds have been reviewed. Changes as in plan section above.    Laboratory results:  I have personally reviewed all pertinent laboratory/tests results.  Recent Results (from the past 48 hour(s))   ECG 12 lead    Collection Time: 04/05/24  3:45 PM   Result Value Ref Range    Ventricular Rate 54 BPM    Atrial Rate 54 BPM    LA Interval 176 ms    QRSD Interval 94 ms    QT Interval 416 ms    QTC Interval 394 ms    P Axis 45 degrees    QRS Axis 6 degrees    T Wave Axis 18 degrees   Folate    Collection Time: 04/06/24  5:55 AM   Result Value Ref Range    Folate >22.3 >5.9 ng/mL   Vitamin B12     Collection Time: 04/06/24  5:55 AM   Result Value Ref Range    Vitamin B-12 422 180 - 914 pg/mL   Vitamin D 25 hydroxy    Collection Time: 04/06/24  5:55 AM   Result Value Ref Range    Vit D, 25-Hydroxy 72.5 30.0 - 100.0 ng/mL   Basic metabolic panel    Collection Time: 04/06/24  5:55 AM   Result Value Ref Range    Sodium 138 135 - 147 mmol/L    Potassium 3.8 3.5 - 5.3 mmol/L    Chloride 114 (H) 96 - 108 mmol/L    CO2 18 (L) 21 - 32 mmol/L    ANION GAP 6 4 - 13 mmol/L    BUN 17 5 - 25 mg/dL    Creatinine 0.72 0.60 - 1.30 mg/dL    Glucose 93 65 - 140 mg/dL    Glucose, Fasting 93 65 - 99 mg/dL    Calcium 8.9 8.4 - 10.2 mg/dL    eGFR 105 ml/min/1.73sq m        Cecelia Carmichael DO  Psychiatry Resident, PGY-III

## 2024-04-08 PROCEDURE — 99232 SBSQ HOSP IP/OBS MODERATE 35: CPT | Performed by: NURSE PRACTITIONER

## 2024-04-08 RX ORDER — LOTEPREDNOL ETABONATE 5 MG/ML
1 SUSPENSION/ DROPS OPHTHALMIC 2 TIMES DAILY
Status: DISCONTINUED | OUTPATIENT
Start: 2024-04-08 | End: 2024-04-17 | Stop reason: HOSPADM

## 2024-04-08 RX ORDER — DEXTROAMPHETAMINE SACCHARATE, AMPHETAMINE ASPARTATE, DEXTROAMPHETAMINE SULFATE AND AMPHETAMINE SULFATE 2.5; 2.5; 2.5; 2.5 MG/1; MG/1; MG/1; MG/1
5 TABLET ORAL DAILY
Status: DISCONTINUED | OUTPATIENT
Start: 2024-04-09 | End: 2024-04-09

## 2024-04-08 RX ORDER — GABAPENTIN 300 MG/1
300 CAPSULE ORAL 2 TIMES DAILY
Status: DISCONTINUED | OUTPATIENT
Start: 2024-04-08 | End: 2024-04-17 | Stop reason: HOSPADM

## 2024-04-08 RX ADMIN — CYCLOSPORINE 1 DROP: 0 SOLUTION/ DROPS OPHTHALMIC; TOPICAL at 08:45

## 2024-04-08 RX ADMIN — CYCLOSPORINE 1 DROP: 0 SOLUTION/ DROPS OPHTHALMIC; TOPICAL at 21:21

## 2024-04-08 RX ADMIN — SPIRONOLACTONE 25 MG: 25 TABLET ORAL at 08:43

## 2024-04-08 RX ADMIN — LINACLOTIDE 1 CAPSULE: 290 CAPSULE, GELATIN COATED ORAL at 08:46

## 2024-04-08 RX ADMIN — ACETAZOLAMIDE 500 MG: 250 TABLET ORAL at 18:40

## 2024-04-08 RX ADMIN — CARIPRAZINE 6 MG: 3 CAPSULE, GELATIN COATED ORAL at 08:43

## 2024-04-08 RX ADMIN — GABAPENTIN 900 MG: 600 TABLET, FILM COATED ORAL at 21:20

## 2024-04-08 RX ADMIN — DEXTROAMPHETAMINE SACCHARATE, AMPHETAMINE ASPARTATE, DEXTROAMPHETAMINE SULFATE AND AMPHETAMINE SULFATE 10 MG: 2.5; 2.5; 2.5; 2.5 TABLET ORAL at 08:43

## 2024-04-08 RX ADMIN — TOPIRAMATE 150 MG: 25 TABLET, FILM COATED ORAL at 08:43

## 2024-04-08 RX ADMIN — LORATADINE 10 MG: 10 TABLET ORAL at 08:43

## 2024-04-08 RX ADMIN — LITHIUM CARBONATE 300 MG: 300 TABLET, EXTENDED RELEASE ORAL at 21:20

## 2024-04-08 RX ADMIN — CELECOXIB 200 MG: 100 CAPSULE ORAL at 18:39

## 2024-04-08 RX ADMIN — B-COMPLEX W/ C & FOLIC ACID TAB 1 TABLET: TAB at 08:43

## 2024-04-08 RX ADMIN — LEVOTHYROXINE SODIUM 112 MCG: 112 TABLET ORAL at 05:44

## 2024-04-08 RX ADMIN — GABAPENTIN 300 MG: 300 CAPSULE ORAL at 08:43

## 2024-04-08 RX ADMIN — VORTIOXETINE 20 MG: 10 TABLET, FILM COATED ORAL at 08:52

## 2024-04-08 RX ADMIN — LOTEPREDNOL ETABONATE 1 DROP: 5 SUSPENSION/ DROPS OPHTHALMIC at 21:20

## 2024-04-08 RX ADMIN — PANTOPRAZOLE SODIUM 40 MG: 40 TABLET, DELAYED RELEASE ORAL at 18:39

## 2024-04-08 RX ADMIN — PROPRANOLOL HYDROCHLORIDE 20 MG: 20 TABLET ORAL at 21:20

## 2024-04-08 RX ADMIN — CELECOXIB 200 MG: 100 CAPSULE ORAL at 08:43

## 2024-04-08 RX ADMIN — ACETAZOLAMIDE 500 MG: 250 TABLET ORAL at 08:45

## 2024-04-08 RX ADMIN — ACETAMINOPHEN 650 MG: 325 TABLET ORAL at 10:26

## 2024-04-08 RX ADMIN — TOPIRAMATE 150 MG: 25 TABLET, FILM COATED ORAL at 18:39

## 2024-04-08 RX ADMIN — Medication 5000 UNITS: at 08:44

## 2024-04-08 RX ADMIN — GABAPENTIN 300 MG: 300 CAPSULE ORAL at 18:39

## 2024-04-08 RX ADMIN — PANTOPRAZOLE SODIUM 40 MG: 40 TABLET, DELAYED RELEASE ORAL at 05:44

## 2024-04-08 RX ADMIN — LITHIUM CARBONATE 300 MG: 300 TABLET, EXTENDED RELEASE ORAL at 08:43

## 2024-04-08 RX ADMIN — METFORMIN HYDROCHLORIDE 500 MG: 500 TABLET ORAL at 08:43

## 2024-04-08 NOTE — NURSING NOTE
Patient was quiet and cooperative. Patient guarded and withdrawn. Staff support provided. Q 7 minute safety checks maintained. Patient denied SI/HI. Patient depressed and withdrawn. Patient compliant with medications and select groups. Patient remains guarded and withdrawn. Staff will continue to monitor and support.

## 2024-04-08 NOTE — PROGRESS NOTES
"Progress Note - Gita Rendon 38 y.o. female MRN: [id number]    Unit/Bed#: [facility], [place] 254-02 Encounter: [id number]        Subjective:   Patient seen and examined at bedside after reviewing the chart and discussing the case with the caring staff.      Patient examined at bedside.  Patient states she is starting to get a headache. She did not get the best sleep the night prior. Denies any other symptoms at this time.       Physical Exam   Vitals: Blood pressure 114/67, pulse 67, temperature 98.2 °F (36.8 °C), temperature source Temporal, resp. rate 16, height 5' 7\" (1.702 m), weight (!) 149 kg (328 lb 12.8 oz), last menstrual period 03/11/2024, SpO2 96%.,Body mass index is 51.5 kg/m².  Constitutional: Patient in no acute distress.  HEENT: PERR, EOMI, MMM.  Cardiovascular: Normal rate and regular rhythm.    Pulmonary/Chest: Effort normal and breath sounds normal.   Abdomen: Soft, + BS, NT.    Assessment/Plan:  Gita Rendon is a(n) 37 y.o. female with MDD.     1.  Hypothyroidism. TSH low 0.153 4/5/24. T4 pending. Decrease levothyroxine to 112 mcg. Follow up with PCP and repeat TSH in 6 weeks.   2.  Constipation.  Continue Linzess 290 mcg daily (brought from home). Miralax and Senokot S as needed.  3.  Allergic rhinitis.  Claritin 10 mg daily.  4.  GERD.  Protonix 40 mg daily (omeprazole nonformulary).  5.  Chronic headaches.  Continue Topamax 150 mg twice daily, propranolol 20 mg twice daily, Imitrex 100 mg as needed.   6.  Chronic back pain.  Patient is on gabapentin 600 mg twice daily, Celebrex 200 mg twice daily.  Patient may use extra pillow for sleep.   7.  Dry eyes.  Continue cyclosporine and Lotemax eye drops (brought from home).  8.  Morbid obesity/prediabetes.  Continue metformin 500 mg daily.  Hgb a1c 5.3% on 4/5/2024. Patient taking Wygovy every Wednesday non formulary. Will see if someone can bring from home.  9.  Hyperaldosteronism.  Patient on spironolactone 25 mg daily.   10. Intracranial HTN. Patient on " Diamox 250 mg daily.  11. Vitamin D deficiency. Continue daily supplement.   12. Hypokalemia. Patient's potassium 3.3 on 4/5. One time dose of potassium chloride 40 mEq. Repeat BMP normal, K 3.8 4/6/24.     The patient was discussed with Dr. Liao and he is in agreement with the above note.

## 2024-04-08 NOTE — PLAN OF CARE
Problem: Risk for Self Injury/Neglect  Goal: Verbalize thoughts and feelings  Description: Interventions:  - Assess and re-assess patient's lethality and potential for self-injury  - Engage patient in 1:1 interactions, daily, for a minimum of 15 minutes  - Encourage patient to express feelings, fears, frustrations, hopes  - Establish rapport/trust with patient   Outcome: Progressing  Goal: Refrain from harming self  Description: Interventions:  - Monitor patient closely, per order  - Develop a trusting relationship  - Supervise medication ingestion, monitor effects and side effects   Outcome: Progressing

## 2024-04-08 NOTE — SOCIAL WORK
04/08/24    Team Meeting   Meeting Type Daily Rounds Meeting   Team Members Present   Team Members Present Physician;Nurse;;Occupational Therapist   Physician Team Member Dr Katty COOL; Zina AHUJA   Nursing Team Member ANAHI Madera   Social Work Team Member ISA Queen   OT Team Member Cheli Dalal, GLADYSS    Patient/Family Present   Patient Present No   Patient's Family Present No   Pt to return home when ready for d/c with psych, therapy, and ICM, gambling addiction, reviewed meds, improved sleep

## 2024-04-08 NOTE — PROGRESS NOTES
"Progress Note - Behavioral Health   Gita Rendon 38 y.o. female MRN: [id number]  Unit/Bed#: U 254-02 Encounter: [id number]    Assessment/Plan   Principal Problem:    Depression  Active Problems:    EILEEN (generalized anxiety disorder)    PTSD (post-traumatic stress disorder)      Behavior over the last 24 hours: Slowly improving  Sleep: Improved, PRN trazodone effective  Appetite: normal  Medication side effects: No  ROS: no complaints and all other systems are negative    Elizabeth has been visible, social, and participatory milieu activities.  Reports improving sleep and depression.  Anxiety and ruminations persist regarding psychosocial stressors.  Has been maintaining safety and denies further SI.  States \"anxiety is my biggest problem.  I get really anxious and then I'm depressed.\"  Education provided regarding coping skills; receptive.  Has been tending to ADLs and appetite is adequate.    Mental Status Evaluation:  Appearance:  casually dressed, overweight, and tattooed   Behavior:  Cooperative   Speech:  normal pitch and normal volume   Mood:  anxious   Affect:  constricted and mood-congruent   Thought Process:  circumstantial   Associations: circumstantial associations   Thought Content:  Ruminating thoughts, obsessions   Perceptual Disturbances: Denies AVH, did not appear internally preoccupied   Risk Potential: Suicidal Ideations none at present  Homicidal Ideations none  Potential for Aggression No   Sensorium:  person, place, time/date, and situation   Memory:  recent and remote memory grossly intact   Consciousness:  alert and awake    Attention: attention span appeared shorter than expected for age   Insight:  limited   Judgment: limited   Gait/Station: normal gait/station and normal balance   Motor Activity: no abnormal movements     Progress Toward Goals: Slowly improving.  Reports improving depression and no return of SI.  Maintaining safety on unit.  Anxiety and ruminations persist.  Plan is to " continue Adderall taper to discontinuation and decrease to 5 mg PO QD. also increase gabapentin 300 mg PO BID and continue 900mg PO QHS for anxiety management.  Makanda level due 4/9/2024 .  No discharge date at this time.    Recommended Treatment: Continue with group therapy, milieu therapy and occupational therapy.      Risks, benefits and possible side effects of Medications:   [facility] has limited understanding of risk versus benefits of medications, but agrees to take as prescribed.    Medications:   Decrease Adderall 5 mg PO QD  Increase Gabapentin 300mg PO BID  all current active meds have been reviewed, continue current psychiatric medications, and current meds:   Current Facility-Administered Medications   Medication Dose Route Frequency    acetaminophen (TYLENOL) tablet 650 mg  650 mg Oral Q6H PRN    acetaminophen (TYLENOL) tablet 650 mg  650 mg Oral Q4H PRN    acetaminophen (TYLENOL) tablet 975 mg  975 mg Oral Q6H PRN    acetaZOLAMIDE (DIAMOX) tablet 500 mg  500 mg Oral BID    aluminum-magnesium hydroxide-simethicone (MAALOX) oral suspension 30 mL  30 mL Oral Q4H PRN    [START ON 4/9/2024] amphetamine-dextroamphetamine (ADDERALL) tablet 5 mg  5 mg Oral Daily    benztropine (COGENTIN) tablet 1 mg  1 mg Oral Q6H PRN    cariprazine (VRAYLAR) capsule 6 mg  6 mg Oral Daily    celecoxib (CeleBREX) capsule 200 mg  200 mg Oral BID    Cholecalciferol (VITAMIN D3) tablet 5,000 Units  5,000 Units Oral Daily    cycloSPORINE (PF) 0.09 % SOLN 1 drop  1 drop Ophthalmic BID    hydrOXYzine HCL (ATARAX) tablet 50 mg  50 mg Oral Q6H PRN Max 4/day    Or    diphenhydrAMINE (BENADRYL) injection 50 mg  50 mg Intramuscular Q6H PRN    gabapentin (NEURONTIN) capsule 300 mg  300 mg Oral BID    gabapentin (NEURONTIN) tablet 900 mg  900 mg Oral HS    hydrOXYzine HCL (ATARAX) tablet 100 mg  100 mg Oral Q6H PRN Max 4/day    Or    LORazepam (ATIVAN) injection 2 mg  2 mg Intramuscular Q6H PRN    hydrOXYzine HCL (ATARAX) tablet 25 mg  25 mg  Oral Q6H PRN Max 4/day    levothyroxine tablet 112 mcg  112 mcg Oral Early Morning    linaCLOtide CAPS 1 capsule  290 mcg Oral Daily    lithium carbonate (LITHOBID) CR tablet 300 mg  300 mg Oral Q12H YFN    loratadine (CLARITIN) tablet 10 mg  10 mg Oral Daily    metFORMIN (GLUCOPHAGE) tablet 500 mg  500 mg Oral Daily With Breakfast    multivitamin stress formula tablet 1 tablet  1 tablet Oral Daily    NON FORMULARY  1 drop Both Eyes BID    OLANZapine (ZyPREXA) tablet 10 mg  10 mg Oral Q3H PRN Max 3/day    Or    OLANZapine (ZyPREXA) IM injection 10 mg  10 mg Intramuscular Q3H PRN Max 3/day    OLANZapine (ZyPREXA) tablet 5 mg  5 mg Oral Q3H PRN Max 6/day    Or    OLANZapine (ZyPREXA) IM injection 5 mg  5 mg Intramuscular Q3H PRN Max 6/day    OLANZapine (ZyPREXA) tablet 2.5 mg  2.5 mg Oral Q3H PRN Max 8/day    pantoprazole (PROTONIX) EC tablet 40 mg  40 mg Oral BID AC    polyethylene glycol (MIRALAX) packet 17 g  17 g Oral Daily PRN    propranolol (INDERAL) tablet 20 mg  20 mg Oral Daily    senna-docusate sodium (SENOKOT S) 8.6-50 mg per tablet 1 tablet  1 tablet Oral BID PRN    spironolactone (ALDACTONE) tablet 25 mg  25 mg Oral Daily    SUMAtriptan (IMITREX) tablet 100 mg  100 mg Oral Daily PRN    topiramate (TOPAMAX) tablet 150 mg  150 mg Oral BID    traZODone (DESYREL) tablet 100 mg  100 mg Oral HS PRN    vortioxetine (TRINTELLIX) tablet 20 mg  20 mg Oral Daily   .    Labs: I have personally reviewed all pertinent laboratory/tests results. CBC:   Lab Results   Component Value Date    WBC 9.55 04/05/2024    RBC 4.69 04/05/2024    HGB 13.4 04/05/2024    HCT 42.5 04/05/2024    MCV 91 04/05/2024     04/05/2024    MCH 28.6 04/05/2024    MCHC 31.5 04/05/2024    RDW 13.4 04/05/2024    MPV 10.9 04/05/2024    NEUTROABS 5.28 04/05/2024     CMP:   Lab Results   Component Value Date    SODIUM 138 04/06/2024    K 3.8 04/06/2024     (H) 04/06/2024    CO2 18 (L) 04/06/2024    AGAP 6 04/06/2024    BUN 17 04/06/2024     CREATININE 0.72 04/06/2024    GLUC 93 04/06/2024    GLUF 93 04/06/2024    CALCIUM 8.9 04/06/2024    AST 17 04/05/2024    ALT 16 04/05/2024    ALKPHOS 55 04/05/2024    TP 6.0 (L) 04/05/2024    ALB 3.6 04/05/2024    TBILI 0.31 04/05/2024    EGFR 105 04/06/2024     Lithium:   Lab Results   Component Value Date    LITHIUM 0.6 01/11/2018     EKG   Lab Results   Component Value Date    VENTRATE 54 04/05/2024    ATRIALRATE 54 04/05/2024    PRINT 176 04/05/2024    QRSDINT 94 04/05/2024    QTINT 416 04/05/2024    QTCINT 394 04/05/2024    PAXIS 45 04/05/2024    QRSAXIS 6 04/05/2024    TWAVEAXIS 18 04/05/2024       Counseling / Coordination of Care  Total floor / unit time spent today 30 minutes. Greater than 50% of total time was spent with the patient and / or family counseling and / or coordination of care. A description of the counseling / coordination of care: Medication education, treatment plan, safety planning

## 2024-04-08 NOTE — NURSING NOTE
"Patient observed within the milieu amongst peers. She reports being \"tired\" after a night of poor sleep on the prior evening and then staying awake. She continues to endorse moderate to high anxiety but denies depression. She denies SI/HI and hallucinations. Denies any pain this evening. Q7 minute checks ongoing.   "

## 2024-04-08 NOTE — NURSING NOTE
Patient observed sleeping comfortably for majority of q7 minute monitoring throughout the night, without demonstrating any signs or symptoms of distress. No acute behaviors overnight. Unlabored even breaths noted. Will remain on safety precautions and continual q7 minute monitoring.

## 2024-04-09 LAB — LITHIUM SERPL-SCNC: 0.55 MMOL/L (ref 0.6–1.2)

## 2024-04-09 PROCEDURE — 99232 SBSQ HOSP IP/OBS MODERATE 35: CPT | Performed by: HOSPITALIST

## 2024-04-09 PROCEDURE — 80178 ASSAY OF LITHIUM: CPT | Performed by: STUDENT IN AN ORGANIZED HEALTH CARE EDUCATION/TRAINING PROGRAM

## 2024-04-09 RX ORDER — ESCITALOPRAM OXALATE 5 MG/1
5 TABLET ORAL DAILY
Status: DISCONTINUED | OUTPATIENT
Start: 2024-04-09 | End: 2024-04-11

## 2024-04-09 RX ORDER — DEXTROAMPHETAMINE SACCHARATE, AMPHETAMINE ASPARTATE, DEXTROAMPHETAMINE SULFATE AND AMPHETAMINE SULFATE 2.5; 2.5; 2.5; 2.5 MG/1; MG/1; MG/1; MG/1
5 TABLET ORAL DAILY
Status: COMPLETED | OUTPATIENT
Start: 2024-04-10 | End: 2024-04-10

## 2024-04-09 RX ORDER — ONDANSETRON 4 MG/1
4 TABLET, ORALLY DISINTEGRATING ORAL EVERY 6 HOURS PRN
Status: DISCONTINUED | OUTPATIENT
Start: 2024-04-09 | End: 2024-04-17 | Stop reason: HOSPADM

## 2024-04-09 RX ADMIN — GABAPENTIN 300 MG: 300 CAPSULE ORAL at 08:25

## 2024-04-09 RX ADMIN — B-COMPLEX W/ C & FOLIC ACID TAB 1 TABLET: TAB at 08:25

## 2024-04-09 RX ADMIN — PROPRANOLOL HYDROCHLORIDE 20 MG: 20 TABLET ORAL at 21:03

## 2024-04-09 RX ADMIN — PANTOPRAZOLE SODIUM 40 MG: 40 TABLET, DELAYED RELEASE ORAL at 08:25

## 2024-04-09 RX ADMIN — Medication 5000 UNITS: at 08:24

## 2024-04-09 RX ADMIN — SPIRONOLACTONE 25 MG: 25 TABLET ORAL at 08:25

## 2024-04-09 RX ADMIN — GABAPENTIN 300 MG: 300 CAPSULE ORAL at 15:42

## 2024-04-09 RX ADMIN — ESCITALOPRAM OXALATE 5 MG: 5 TABLET, FILM COATED ORAL at 12:49

## 2024-04-09 RX ADMIN — TOPIRAMATE 150 MG: 25 TABLET, FILM COATED ORAL at 08:25

## 2024-04-09 RX ADMIN — CYCLOSPORINE 1 DROP: 0 SOLUTION/ DROPS OPHTHALMIC; TOPICAL at 21:05

## 2024-04-09 RX ADMIN — CELECOXIB 200 MG: 100 CAPSULE ORAL at 17:43

## 2024-04-09 RX ADMIN — VORTIOXETINE 20 MG: 10 TABLET, FILM COATED ORAL at 08:27

## 2024-04-09 RX ADMIN — PANTOPRAZOLE SODIUM 40 MG: 40 TABLET, DELAYED RELEASE ORAL at 15:41

## 2024-04-09 RX ADMIN — CYCLOSPORINE 1 DROP: 0 SOLUTION/ DROPS OPHTHALMIC; TOPICAL at 08:26

## 2024-04-09 RX ADMIN — GABAPENTIN 900 MG: 600 TABLET, FILM COATED ORAL at 21:03

## 2024-04-09 RX ADMIN — LOTEPREDNOL ETABONATE 1 DROP: 5 SUSPENSION/ DROPS OPHTHALMIC at 08:27

## 2024-04-09 RX ADMIN — LITHIUM CARBONATE 300 MG: 300 TABLET, EXTENDED RELEASE ORAL at 08:25

## 2024-04-09 RX ADMIN — ACETAZOLAMIDE 500 MG: 250 TABLET ORAL at 17:47

## 2024-04-09 RX ADMIN — CARIPRAZINE 6 MG: 3 CAPSULE, GELATIN COATED ORAL at 08:24

## 2024-04-09 RX ADMIN — LINACLOTIDE 1 CAPSULE: 290 CAPSULE, GELATIN COATED ORAL at 08:27

## 2024-04-09 RX ADMIN — LOTEPREDNOL ETABONATE 1 DROP: 5 SUSPENSION/ DROPS OPHTHALMIC at 21:06

## 2024-04-09 RX ADMIN — LITHIUM CARBONATE 300 MG: 300 TABLET, EXTENDED RELEASE ORAL at 21:03

## 2024-04-09 RX ADMIN — METFORMIN HYDROCHLORIDE 500 MG: 500 TABLET ORAL at 08:25

## 2024-04-09 RX ADMIN — SUMATRIPTAN SUCCINATE 100 MG: 25 TABLET ORAL at 15:41

## 2024-04-09 RX ADMIN — DEXTROAMPHETAMINE SACCHARATE, AMPHETAMINE ASPARTATE, DEXTROAMPHETAMINE SULFATE AND AMPHETAMINE SULFATE 5 MG: 2.5; 2.5; 2.5; 2.5 TABLET ORAL at 08:25

## 2024-04-09 RX ADMIN — ONDANSETRON 4 MG: 4 TABLET, ORALLY DISINTEGRATING ORAL at 18:15

## 2024-04-09 RX ADMIN — LORATADINE 10 MG: 10 TABLET ORAL at 08:25

## 2024-04-09 RX ADMIN — LEVOTHYROXINE SODIUM 112 MCG: 112 TABLET ORAL at 05:12

## 2024-04-09 RX ADMIN — TOPIRAMATE 150 MG: 25 TABLET, FILM COATED ORAL at 17:43

## 2024-04-09 RX ADMIN — CELECOXIB 200 MG: 100 CAPSULE ORAL at 08:24

## 2024-04-09 RX ADMIN — ACETAZOLAMIDE 500 MG: 250 TABLET ORAL at 08:26

## 2024-04-09 NOTE — SOCIAL WORK
04/09/24    Team Meeting   Meeting Type Daily Rounds Meeting   Team Members Present   Team Members Present Physician;Nurse;;Occupational Therapist   Physician Team Member Dr Katty COOL; Zina Myers MD   Nursing Team Member ANAHI Madera   Social Work Team Member ISA Queen   OT Team Member Cheli Dalal   Patient/Family Present   Patient Present No   Patient's Family Present No   Pt to d/c home with psych and ICM f/u, pleasant, reviewed meds, endorses dep/anx, flat, depressed

## 2024-04-09 NOTE — PLAN OF CARE
Problem: Depression  Goal: Verbalize thoughts and feelings  Description: Interventions:  - Assess and re-assess patient's level of risk   - Engage patient in 1:1 interactions, daily, for a minimum of 15 minutes   - Encourage patient to express feelings, fears, frustrations, hopes   Outcome: Progressing     Problem: Anxiety  Goal: Anxiety is at manageable level  Description: Interventions:  - Assess and monitor patient's anxiety level.   - Monitor for signs and symptoms (heart palpitations, chest pain, shortness of breath, headaches, nausea, feeling jumpy, restlessness, irritable, apprehensive).   - Collaborate with interdisciplinary team and initiate plan and interventions as ordered.  - [place] patient to unit/surroundings  - Explain treatment plan  - Encourage participation in care  - Encourage verbalization of concerns/fears  - Identify coping mechanisms  - Assist in developing anxiety-reducing skills  - Administer/offer alternative therapies  - Limit or eliminate stimulants  Outcome: Not Progressing

## 2024-04-09 NOTE — PROGRESS NOTES
"Progress Note - Gita Rendon 38 y.o. female MRN: [id number]    Unit/Bed#: [facility] 254-02 Encounter: [id number]        Subjective:   Patient seen and examined at bedside after reviewing the chart and discussing the case with the caring staff.      Patient examined at bedside.  Patient had Wegovy brought from home. She takes every Wednesday. Denies any acute symptoms.       Physical Exam   Vitals: Blood pressure 116/65, pulse 102, temperature 98.3 °F (36.8 °C), temperature source Temporal, resp. rate 17, height 5' 7\" (1.702 m), weight (!) 149 kg (328 lb 12.8 oz), last menstrual period 03/11/2024, SpO2 98%.,Body mass index is 51.5 kg/m².  Constitutional: Patient in no acute distress.  HEENT: PERR, EOMI, MMM.  Cardiovascular: Normal rate and regular rhythm.    Pulmonary/Chest: Effort normal and breath sounds normal.   Abdomen: Soft, + BS, NT.    Assessment/Plan:  Gita Rendon is a(n) 37 y.o. female with MDD.     1.  Hypothyroidism. TSH low 0.153 4/5/24. T4 pending. Decrease levothyroxine to 112 mcg. Follow up with PCP and repeat TSH in 6 weeks.   2.  Constipation.  Continue Linzess 290 mcg daily (brought from home). Miralax and Senokot S as needed.  3.  Allergic rhinitis.  Claritin 10 mg daily.  4.  GERD.  Protonix 40 mg daily (omeprazole nonformulary).  5.  Chronic headaches.  Continue Topamax 150 mg twice daily, propranolol 20 mg twice daily, Imitrex 100 mg as needed.   6.  Chronic back pain.  Patient is on gabapentin 600 mg twice daily, Celebrex 200 mg twice daily.  Patient may use extra pillow for sleep.   7.  Dry eyes.  Continue cyclosporine and Lotemax eye drops (brought from home).  8.  Morbid obesity/prediabetes.  Continue metformin 500 mg daily.  Hgb a1c 5.3% on 4/5/2024. Patient taking Wygovy every Wednesday non formulary.  9.  Hyperaldosteronism.  Patient on spironolactone 25 mg daily.   10. Intracranial HTN. Patient on Diamox 250 mg daily.  11. Vitamin D deficiency. Continue daily supplement.   12. " Hypokalemia. Patient's potassium 3.3 on 4/5. One time dose of potassium chloride 40 mEq. Repeat BMP normal, K 3.8 4/6/24.     The patient was discussed with Dr. Liao and he is in agreement with the above note.

## 2024-04-09 NOTE — NURSING NOTE
"Patient withdrawn to room for much in the evening. Brightens on approach. She reports having a headache and feeling increased stress due to feeling like she didn't address much of her at home responsibilities today. Reassurance provided. She reports her anxiety is related to her \"lack of control\" and OCD. She continues to deny SI/HI and hallucinations. She reports improved sleep which has helped her depression, but is very focused on finances and her dog this evening. Compliant with medication as ordered and denies side effects. Decreased stimulation for her headache. Q7 minute checks ongoing.   "

## 2024-04-09 NOTE — NURSING NOTE
Patient was quiet and cooperative. Patient isolative and withdrawn. Staff support provided. Q 7 minute safety checks maintained. Patient denied SI/HI. Patient sad and depressed. Patient continues to isolate.Patient compliant with medications and select groups. Staff will continue to monitor and support.

## 2024-04-09 NOTE — SOCIAL WORK
CM met with pt in the rabago.  CM provided reassurance as pt was concerned with her dog and making the decision to give to a senior dog rescue.  Pt stated that she has a lot of anx about her dog as she had him for many yrs and he was an emotional support dog.  CM empathized with pt and offered support.

## 2024-04-09 NOTE — PROGRESS NOTES
Progress Note - Behavioral Health   Gtia Rendon 38 y.o. female MRN: [id number]  Unit/Bed#: U 254-02 Encounter: [id number]    Assessment/Plan   Principal Problem:    Depression  Active Problems:    EILEEN (generalized anxiety disorder)    PTSD (post-traumatic stress disorder)      Behavior over the last 24 hours:  unchanged  Sleep: normal  Appetite: normal  Medication side effects: No  ROS: no complaints and all other systems are negative    Elizabeth has been intermittently visible in the milieu and participates in unit activities.  Continues to endorse ongoing anxiety and obsessive thoughts; rates anxiety 6/10.  Describes depression as mild with no return of SI.  Elizabeth has been maintaining safety on unit.  Somewhat preservative on medications for treating anxiety.  Denies sleep disturbance.  Receptive to medication education.    Mental Status Evaluation:  Appearance:  casually dressed and overweight   Behavior:  Cooperative   Speech:  soft   Mood:  anxious   Affect:  blunted   Thought Process:  perserverative   Associations: circumstantial associations   Thought Content:  Obsessions and ruminating thoughts , negative thinking   Perceptual Disturbances: Denies AVH, did not appear internally preoccupied   Risk Potential: Suicidal Ideations none at present  Homicidal Ideations none  Potential for Aggression No   Sensorium:  person, place, time/date, and situation   Memory:  recent and remote memory grossly intact   Consciousness:  alert and awake    Attention: attention span and concentration were age appropriate   Insight:  limited   Judgment: limited   Gait/Station: normal gait/station and normal balance   Motor Activity: no abnormal movements     Progress Toward Goals: Anxiety and obsessive thoughts persist.  Has been maintaining safety with no return of SI.  Depression improving. Decrease Trintellix 10 mg PO QD with plan to taper to discontinuation due to ineffectiveness for depression/anxiety.  Will initiate  Lexapro 5 mg PO QD for treatment of depression, anxiety, and OCD; Lexapro chosen for reduced risk of medication interactions.  Tolerating titration of gabapentin well for treatment of anxiety.  Lithium level resulted at 0.55 today.  Plan is also taper Adderall to discontinuation by end of week to reduce anxiety and mood instability.    Recommended Treatment: Continue with group therapy, milieu therapy and occupational therapy.      Risks, benefits and possible side effects of Medications:   Risks, benefits, and possible side effects of medications explained to patient and patient verbalizes understanding.      Medications:   Decrease Trintellix 10 mg PO QD  Start Lexapro 5mg PO QD  all current active meds have been reviewed and current meds:   Current Facility-Administered Medications   Medication Dose Route Frequency    acetaminophen (TYLENOL) tablet 650 mg  650 mg Oral Q6H PRN    acetaminophen (TYLENOL) tablet 650 mg  650 mg Oral Q4H PRN    acetaminophen (TYLENOL) tablet 975 mg  975 mg Oral Q6H PRN    acetaZOLAMIDE (DIAMOX) tablet 500 mg  500 mg Oral BID    aluminum-magnesium hydroxide-simethicone (MAALOX) oral suspension 30 mL  30 mL Oral Q4H PRN    amphetamine-dextroamphetamine (ADDERALL) tablet 5 mg  5 mg Oral Daily    benztropine (COGENTIN) tablet 1 mg  1 mg Oral Q6H PRN    cariprazine (VRAYLAR) capsule 6 mg  6 mg Oral Daily    celecoxib (CeleBREX) capsule 200 mg  200 mg Oral BID    Cholecalciferol (VITAMIN D3) tablet 5,000 Units  5,000 Units Oral Daily    cycloSPORINE (PF) 0.09 % SOLN 1 drop  1 drop Ophthalmic BID    hydrOXYzine HCL (ATARAX) tablet 50 mg  50 mg Oral Q6H PRN Max 4/day    Or    diphenhydrAMINE (BENADRYL) injection 50 mg  50 mg Intramuscular Q6H PRN    escitalopram (LEXAPRO) tablet 5 mg  5 mg Oral Daily    gabapentin (NEURONTIN) capsule 300 mg  300 mg Oral BID    gabapentin (NEURONTIN) tablet 900 mg  900 mg Oral HS    hydrOXYzine HCL (ATARAX) tablet 100 mg  100 mg Oral Q6H PRN Max 4/day    Or     LORazepam (ATIVAN) injection 2 mg  2 mg Intramuscular Q6H PRN    hydrOXYzine HCL (ATARAX) tablet 25 mg  25 mg Oral Q6H PRN Max 4/day    levothyroxine tablet 112 mcg  112 mcg Oral Early Morning    linaCLOtide CAPS 1 capsule  290 mcg Oral Daily    lithium carbonate (LITHOBID) CR tablet 300 mg  300 mg Oral Q12H YFN    loratadine (CLARITIN) tablet 10 mg  10 mg Oral Daily    loteprednol etabonate (LOTEMAX) 0.5 % ophthalmic suspension 1 drop  1 drop Both Eyes BID    metFORMIN (GLUCOPHAGE) tablet 500 mg  500 mg Oral Daily With Breakfast    multivitamin stress formula tablet 1 tablet  1 tablet Oral Daily    OLANZapine (ZyPREXA) tablet 10 mg  10 mg Oral Q3H PRN Max 3/day    Or    OLANZapine (ZyPREXA) IM injection 10 mg  10 mg Intramuscular Q3H PRN Max 3/day    OLANZapine (ZyPREXA) tablet 5 mg  5 mg Oral Q3H PRN Max 6/day    Or    OLANZapine (ZyPREXA) IM injection 5 mg  5 mg Intramuscular Q3H PRN Max 6/day    OLANZapine (ZyPREXA) tablet 2.5 mg  2.5 mg Oral Q3H PRN Max 8/day    pantoprazole (PROTONIX) EC tablet 40 mg  40 mg Oral BID AC    polyethylene glycol (MIRALAX) packet 17 g  17 g Oral Daily PRN    propranolol (INDERAL) tablet 20 mg  20 mg Oral Daily    [START ON 4/10/2024] Semaglutide-Weight Management (WEGOVY) subcutaneous 1.7 mg  1.7 mg Subcutaneous Weekly    senna-docusate sodium (SENOKOT S) 8.6-50 mg per tablet 1 tablet  1 tablet Oral BID PRN    spironolactone (ALDACTONE) tablet 25 mg  25 mg Oral Daily    SUMAtriptan (IMITREX) tablet 100 mg  100 mg Oral Daily PRN    topiramate (TOPAMAX) tablet 150 mg  150 mg Oral BID    traZODone (DESYREL) tablet 100 mg  100 mg Oral HS PRN    [START ON 4/10/2024] vortioxetine (TRINTELLIX) tablet 10 mg  10 mg Oral Daily   .    Labs: I have personally reviewed all pertinent laboratory/tests results. CBC:   Lab Results   Component Value Date    WBC 9.55 04/05/2024    RBC 4.69 04/05/2024    HGB 13.4 04/05/2024    HCT 42.5 04/05/2024    MCV 91 04/05/2024     04/05/2024    MCH 28.6  04/05/2024    MCHC 31.5 04/05/2024    RDW 13.4 04/05/2024    MPV 10.9 04/05/2024    NEUTROABS 5.28 04/05/2024     CMP:   Lab Results   Component Value Date    SODIUM 138 04/06/2024    K 3.8 04/06/2024     (H) 04/06/2024    CO2 18 (L) 04/06/2024    AGAP 6 04/06/2024    BUN 17 04/06/2024    CREATININE 0.72 04/06/2024    GLUC 93 04/06/2024    GLUF 93 04/06/2024    CALCIUM 8.9 04/06/2024    AST 17 04/05/2024    ALT 16 04/05/2024    ALKPHOS 55 04/05/2024    TP 6.0 (L) 04/05/2024    ALB 3.6 04/05/2024    TBILI 0.31 04/05/2024    EGFR 105 04/06/2024     Lithium:   Lab Results   Component Value Date    LITHIUM 0.55 (L) 04/09/2024     EKG   Lab Results   Component Value Date    VENTRATE 54 04/05/2024    ATRIALRATE 54 04/05/2024    PRINT 176 04/05/2024    QRSDINT 94 04/05/2024    QTINT 416 04/05/2024    QTCINT 394 04/05/2024    PAXIS 45 04/05/2024    QRSAXIS 6 04/05/2024    TWAVEAXIS 18 04/05/2024       Counseling / Coordination of Care  Total floor / unit time spent today 30 minutes. Greater than 50% of total time was spent with the patient and / or family counseling and / or coordination of care. A description of the counseling / coordination of care: Medication education, treatment plan, safety planning

## 2024-04-10 PROCEDURE — 99232 SBSQ HOSP IP/OBS MODERATE 35: CPT | Performed by: NURSE PRACTITIONER

## 2024-04-10 RX ADMIN — ACETAZOLAMIDE 500 MG: 250 TABLET ORAL at 09:12

## 2024-04-10 RX ADMIN — B-COMPLEX W/ C & FOLIC ACID TAB 1 TABLET: TAB at 09:02

## 2024-04-10 RX ADMIN — VORTIOXETINE 10 MG: 10 TABLET, FILM COATED ORAL at 09:24

## 2024-04-10 RX ADMIN — TOPIRAMATE 150 MG: 25 TABLET, FILM COATED ORAL at 17:18

## 2024-04-10 RX ADMIN — SPIRONOLACTONE 25 MG: 25 TABLET ORAL at 09:02

## 2024-04-10 RX ADMIN — ESCITALOPRAM OXALATE 5 MG: 5 TABLET, FILM COATED ORAL at 09:03

## 2024-04-10 RX ADMIN — CELECOXIB 200 MG: 100 CAPSULE ORAL at 09:03

## 2024-04-10 RX ADMIN — CYCLOSPORINE 1 DROP: 0 SOLUTION/ DROPS OPHTHALMIC; TOPICAL at 09:13

## 2024-04-10 RX ADMIN — CARIPRAZINE 6 MG: 3 CAPSULE, GELATIN COATED ORAL at 09:02

## 2024-04-10 RX ADMIN — ACETAZOLAMIDE 500 MG: 250 TABLET ORAL at 18:45

## 2024-04-10 RX ADMIN — GABAPENTIN 300 MG: 300 CAPSULE ORAL at 09:02

## 2024-04-10 RX ADMIN — PANTOPRAZOLE SODIUM 40 MG: 40 TABLET, DELAYED RELEASE ORAL at 06:24

## 2024-04-10 RX ADMIN — CYCLOSPORINE 1 DROP: 0 SOLUTION/ DROPS OPHTHALMIC; TOPICAL at 21:10

## 2024-04-10 RX ADMIN — LOTEPREDNOL ETABONATE 1 DROP: 5 SUSPENSION/ DROPS OPHTHALMIC at 09:12

## 2024-04-10 RX ADMIN — LOTEPREDNOL ETABONATE 1 DROP: 5 SUSPENSION/ DROPS OPHTHALMIC at 21:10

## 2024-04-10 RX ADMIN — PANTOPRAZOLE SODIUM 40 MG: 40 TABLET, DELAYED RELEASE ORAL at 17:18

## 2024-04-10 RX ADMIN — LINACLOTIDE 1 CAPSULE: 290 CAPSULE, GELATIN COATED ORAL at 09:12

## 2024-04-10 RX ADMIN — DEXTROAMPHETAMINE SACCHARATE, AMPHETAMINE ASPARTATE, DEXTROAMPHETAMINE SULFATE AND AMPHETAMINE SULFATE 5 MG: 2.5; 2.5; 2.5; 2.5 TABLET ORAL at 09:03

## 2024-04-10 RX ADMIN — GABAPENTIN 900 MG: 600 TABLET, FILM COATED ORAL at 21:09

## 2024-04-10 RX ADMIN — Medication 5000 UNITS: at 09:02

## 2024-04-10 RX ADMIN — TOPIRAMATE 150 MG: 25 TABLET, FILM COATED ORAL at 09:03

## 2024-04-10 RX ADMIN — LORATADINE 10 MG: 10 TABLET ORAL at 09:03

## 2024-04-10 RX ADMIN — SEMAGLUTIDE 1.7 MG: 1.7 INJECTION, SOLUTION SUBCUTANEOUS at 09:14

## 2024-04-10 RX ADMIN — PROPRANOLOL HYDROCHLORIDE 20 MG: 20 TABLET ORAL at 21:08

## 2024-04-10 RX ADMIN — LITHIUM CARBONATE 300 MG: 300 TABLET, EXTENDED RELEASE ORAL at 09:02

## 2024-04-10 RX ADMIN — GABAPENTIN 300 MG: 300 CAPSULE ORAL at 17:18

## 2024-04-10 RX ADMIN — LEVOTHYROXINE SODIUM 112 MCG: 112 TABLET ORAL at 06:24

## 2024-04-10 RX ADMIN — CELECOXIB 200 MG: 100 CAPSULE ORAL at 17:17

## 2024-04-10 RX ADMIN — METFORMIN HYDROCHLORIDE 500 MG: 500 TABLET ORAL at 06:23

## 2024-04-10 RX ADMIN — LITHIUM CARBONATE 300 MG: 300 TABLET, EXTENDED RELEASE ORAL at 21:08

## 2024-04-10 NOTE — SOCIAL WORK
Intake completed by Pt on 4/8/24. Pt identified financial issues, her living situation, and taking care of Max as stressors. She likes board games and [employer], crafts, and social media. She would like to work on self- esteem, coping, increasing communication with family and developing relationships during her stay  [place] of intake placed in media

## 2024-04-10 NOTE — SOCIAL WORK
04/10/24    Team Meeting   Meeting Type Daily Rounds Meeting   Team Members Present   Team Members Present Physician;Nurse;;Occupational Therapist   Physician Team Member  Zina Myers MD   Nursing Team Member ANAHI Madera   Social Work Team Member ISA Queen   OT Team Member Cheli Dalal   Patient/Family Present   Patient Present No   Patient's Family Present No   Return home with psych, therapy, and ICM services, anx about dog, reviewed meds, attention seeking at times

## 2024-04-10 NOTE — PLAN OF CARE
Problem: Ineffective Coping  Goal: Participates in unit activities  Description: Interventions:  - Provide therapeutic environment   - Provide required programming   - Redirect inappropriate behaviors   Outcome: Progressing   PT continues to attend select groups and has been having some positive social interactions with peers.

## 2024-04-10 NOTE — NURSING NOTE
Patient is visible in milieu with peers. Patient is pleasant upon approach and medication compliant. Patient denies all, no anxiety and depression, no SI/HI or hallucinations. Continued care and 7 minute safety checks in progress.

## 2024-04-10 NOTE — PROGRESS NOTES
"Progress Note - Behavioral Health   Gita Rendon 38 y.o. female MRN: [id number]  Unit/Bed#: [facility] 254-02 Encounter: [id number]    Assessment/Plan   Principal Problem:    Depression  Active Problems:    EILEEN (generalized anxiety disorder)    PTSD (post-traumatic stress disorder)      Behavior over the last 24 hours: Slowly improving  Sleep: normal  Appetite: Fair  Medication side effects: No  ROS:  Intermittent nausea \"from wegovy\" and all other systems are negative    Elizabeth has been visible and participatory milieu activities.  Presents as less anxious with constricted affect.  Continues to endorse ongoing anxiety, ruminations, and obsessive thoughts; particularly regarding food.  States she is afraid to gain weight; denies any restriction or binging and purging episodes.  Responded well to supportive therapy.  Denies SI and has been maintaining safety on unit.  Compliant with medications and sleeping undisturbed throughout the night.    Mental Status Evaluation:  Appearance:  age appropriate, casually dressed, overweight, and shoulderlength hair   Behavior:  Cooperative, calm   Speech:  normal pitch and normal volume   Mood:  Less anxious, \"tired\"   Affect:  constricted, mood-congruent, and redirectable   Thought Process:  circumstantial   Associations: circumstantial associations   Thought Content:  Ruminating and obsessive thoughts   Perceptual Disturbances: Denies AVH, did not appear internally preoccupied   Risk Potential: Suicidal Ideations none  Homicidal Ideations none  Potential for Aggression No   Sensorium:  person, place, time/date, and situation   Memory:  recent and remote memory grossly intact   Consciousness:  alert and awake    Attention: attention span and concentration were age appropriate   Insight:  limited   Judgment: limited   Gait/Station: normal gait/station and normal balance   Motor Activity: no abnormal movements     Progress Toward Goals: Rumination and obsessive thoughts persist.  " Reports decreasing anxiety and has been maintaining safety with no return of SI.  Plan is to discontinue Adderall 5 mg PO QD to reduce anxiety and risk of mood instability in context of mood disorder.  Lithium level therapeutic at 0.6.  Plan is to continue cross titration from Trintellix to Lexapro for anxiety, depression, and OCD.  Will decrease Trintellix 5 mg PO QD x 1 dose, then discontinue.  Plan is to further titrate Lexapro over the next 24 to 48 hours.  Should patient continue to maintain safety and mood control with no return of SI, anticipated discharge early to mid next week.    Recommended Treatment: Continue with group therapy, milieu therapy and occupational therapy.      Risks, benefits and possible side effects of Medications:   Risks, benefits, and possible side effects of medications explained to patient and patient verbalizes understanding.      Medications:   Discontinue Adderall 5 mg PO QD  Decrease Trintellix 5 mg PO QD x 1 dose then discontinue  all current active meds have been reviewed and current meds:   Current Facility-Administered Medications   Medication Dose Route Frequency    acetaminophen (TYLENOL) tablet 650 mg  650 mg Oral Q6H PRN    acetaminophen (TYLENOL) tablet 650 mg  650 mg Oral Q4H PRN    acetaminophen (TYLENOL) tablet 975 mg  975 mg Oral Q6H PRN    acetaZOLAMIDE (DIAMOX) tablet 500 mg  500 mg Oral BID    aluminum-magnesium hydroxide-simethicone (MAALOX) oral suspension 30 mL  30 mL Oral Q4H PRN    benztropine (COGENTIN) tablet 1 mg  1 mg Oral Q6H PRN    cariprazine (VRAYLAR) capsule 6 mg  6 mg Oral Daily    celecoxib (CeleBREX) capsule 200 mg  200 mg Oral BID    Cholecalciferol (VITAMIN D3) tablet 5,000 Units  5,000 Units Oral Daily    cycloSPORINE (PF) 0.09 % SOLN 1 drop  1 drop Ophthalmic BID    hydrOXYzine HCL (ATARAX) tablet 50 mg  50 mg Oral Q6H PRN Max 4/day    Or    diphenhydrAMINE (BENADRYL) injection 50 mg  50 mg Intramuscular Q6H PRN    escitalopram (LEXAPRO)  tablet 5 mg  5 mg Oral Daily    gabapentin (NEURONTIN) capsule 300 mg  300 mg Oral BID    gabapentin (NEURONTIN) tablet 900 mg  900 mg Oral HS    hydrOXYzine HCL (ATARAX) tablet 100 mg  100 mg Oral Q6H PRN Max 4/day    Or    LORazepam (ATIVAN) injection 2 mg  2 mg Intramuscular Q6H PRN    hydrOXYzine HCL (ATARAX) tablet 25 mg  25 mg Oral Q6H PRN Max 4/day    levothyroxine tablet 112 mcg  112 mcg Oral Early Morning    linaCLOtide CAPS 1 capsule  290 mcg Oral Daily    lithium carbonate (LITHOBID) CR tablet 300 mg  300 mg Oral Q12H YFN    loratadine (CLARITIN) tablet 10 mg  10 mg Oral Daily    loteprednol etabonate (LOTEMAX) 0.5 % ophthalmic suspension 1 drop  1 drop Both Eyes BID    metFORMIN (GLUCOPHAGE) tablet 500 mg  500 mg Oral Daily With Breakfast    multivitamin stress formula tablet 1 tablet  1 tablet Oral Daily    OLANZapine (ZyPREXA) tablet 10 mg  10 mg Oral Q3H PRN Max 3/day    Or    OLANZapine (ZyPREXA) IM injection 10 mg  10 mg Intramuscular Q3H PRN Max 3/day    OLANZapine (ZyPREXA) tablet 5 mg  5 mg Oral Q3H PRN Max 6/day    Or    OLANZapine (ZyPREXA) IM injection 5 mg  5 mg Intramuscular Q3H PRN Max 6/day    OLANZapine (ZyPREXA) tablet 2.5 mg  2.5 mg Oral Q3H PRN Max 8/day    ondansetron (ZOFRAN-ODT) dispersible tablet 4 mg  4 mg Oral Q6H PRN    pantoprazole (PROTONIX) EC tablet 40 mg  40 mg Oral BID AC    polyethylene glycol (MIRALAX) packet 17 g  17 g Oral Daily PRN    propranolol (INDERAL) tablet 20 mg  20 mg Oral Daily    Semaglutide-Weight Management (WEGOVY) subcutaneous 1.7 mg  1.7 mg Subcutaneous Weekly    senna-docusate sodium (SENOKOT S) 8.6-50 mg per tablet 1 tablet  1 tablet Oral BID PRN    spironolactone (ALDACTONE) tablet 25 mg  25 mg Oral Daily    SUMAtriptan (IMITREX) tablet 100 mg  100 mg Oral Daily PRN    topiramate (TOPAMAX) tablet 150 mg  150 mg Oral BID    traZODone (DESYREL) tablet 100 mg  100 mg Oral HS PRN    [START ON 4/11/2024] vortioxetine (TRINTELLIX) tablet 5 mg  5 mg Oral  Daily   .    Labs: I have personally reviewed all pertinent laboratory/tests results. CBC:   Lab Results   Component Value Date    WBC 9.55 04/05/2024    RBC 4.69 04/05/2024    HGB 13.4 04/05/2024    HCT 42.5 04/05/2024    MCV 91 04/05/2024     04/05/2024    MCH 28.6 04/05/2024    MCHC 31.5 04/05/2024    RDW 13.4 04/05/2024    MPV 10.9 04/05/2024    NEUTROABS 5.28 04/05/2024     CMP:   Lab Results   Component Value Date    SODIUM 138 04/06/2024    K 3.8 04/06/2024     (H) 04/06/2024    CO2 18 (L) 04/06/2024    AGAP 6 04/06/2024    BUN 17 04/06/2024    CREATININE 0.72 04/06/2024    GLUC 93 04/06/2024    GLUF 93 04/06/2024    CALCIUM 8.9 04/06/2024    AST 17 04/05/2024    ALT 16 04/05/2024    ALKPHOS 55 04/05/2024    TP 6.0 (L) 04/05/2024    ALB 3.6 04/05/2024    TBILI 0.31 04/05/2024    EGFR 105 04/06/2024     Lithium:   Lab Results   Component Value Date    LITHIUM 0.55 (L) 04/09/2024     Drug Screen:   Lab Results   Component Value Date    AMPMETHUR Positive (A) 04/03/2024    BARBTUR Negative 04/03/2024    BDZUR Negative 04/03/2024    THCUR Negative 04/03/2024    COCAINEUR Negative 04/03/2024    METHADONEUR Negative 04/03/2024    OPIATEUR Negative 04/03/2024    PCPUR Negative 04/03/2024     EKG   Lab Results   Component Value Date    VENTRATE 54 04/05/2024    ATRIALRATE 54 04/05/2024    PRINT 176 04/05/2024    QRSDINT 94 04/05/2024    QTINT 416 04/05/2024    QTCINT 394 04/05/2024    PAXIS 45 04/05/2024    QRSAXIS 6 04/05/2024    TWAVEAXIS 18 04/05/2024       Counseling / Coordination of Care  Total floor / unit time spent today 30 minutes. Greater than 50% of total time was spent with the patient and / or family counseling and / or coordination of care. A description of the counseling / coordination of care: Medication education, treatment plan, supportive therapy

## 2024-04-10 NOTE — NURSING NOTE
Patient was calm, cooperative and pleasant upon approach. Complied with medications and meals. Pt spend most of the shift pacing the hallways. Staff maintained Q 7 safety checks.

## 2024-04-10 NOTE — PROGRESS NOTES
"Progress Note - Gita Rendon 38 y.o. female MRN: [id number]    Unit/Bed#: [facility] 254-02 Encounter: [id number]        Subjective:   Patient seen and examined at bedside after reviewing the chart and discussing the case with the caring staff.      Patient examined at bedside.  Patient has no acute symptoms.       Physical Exam   Vitals: Blood pressure 119/72, pulse 69, temperature 97.6 °F (36.4 °C), temperature source Temporal, resp. rate 18, height 5' 7\" (1.702 m), weight (!) 149 kg (328 lb 12.8 oz), last menstrual period 03/11/2024, SpO2 95%.,Body mass index is 51.5 kg/m².  Constitutional: Patient in no acute distress.  HEENT: PERR, EOMI, MMM.  Cardiovascular: Normal rate and regular rhythm.    Pulmonary/Chest: Effort normal and breath sounds normal.   Abdomen: Soft, + BS, NT.    Assessment/Plan:  Gita Rendon is a(n) 37 y.o. female with MDD.     1.  Hypothyroidism. TSH low 0.153 4/5/24. T4 pending. Decrease levothyroxine to 112 mcg. Follow up with PCP and repeat TSH in 6 weeks.   2.  Constipation.  Continue Linzess 290 mcg daily (brought from home). Miralax and Senokot S as needed.  3.  Allergic rhinitis.  Claritin 10 mg daily.  4.  GERD.  Protonix 40 mg daily (omeprazole nonformulary).  5.  Chronic headaches.  Continue Topamax 150 mg twice daily, propranolol 20 mg twice daily, Imitrex 100 mg as needed.   6.  Chronic back pain.  Patient is on gabapentin 600 mg twice daily, Celebrex 200 mg twice daily.  Patient may use extra pillow for sleep.   7.  Dry eyes.  Continue cyclosporine and Lotemax eye drops (brought from home).  8.  Morbid obesity/prediabetes.  Continue metformin 500 mg daily.  Hgb a1c 5.3% on 4/5/2024. Patient taking Wygovy every Wednesday non formulary.  9.  Hyperaldosteronism.  Patient on spironolactone 25 mg daily.   10. Intracranial HTN. Patient on Diamox 250 mg daily.  11. Vitamin D deficiency. Continue daily supplement.   12. Hypokalemia. Patient's potassium 3.3 on 4/5. One time dose of " potassium chloride 40 mEq. Repeat BMP normal, K 3.8 4/6/24.     The patient was discussed with Dr. Liao and he is in agreement with the above note.

## 2024-04-11 PROCEDURE — 99232 SBSQ HOSP IP/OBS MODERATE 35: CPT | Performed by: STUDENT IN AN ORGANIZED HEALTH CARE EDUCATION/TRAINING PROGRAM

## 2024-04-11 RX ORDER — ESCITALOPRAM OXALATE 10 MG/1
10 TABLET ORAL DAILY
Status: DISCONTINUED | OUTPATIENT
Start: 2024-04-12 | End: 2024-04-17 | Stop reason: HOSPADM

## 2024-04-11 RX ADMIN — PANTOPRAZOLE SODIUM 40 MG: 40 TABLET, DELAYED RELEASE ORAL at 08:38

## 2024-04-11 RX ADMIN — CYCLOSPORINE 1 DROP: 0 SOLUTION/ DROPS OPHTHALMIC; TOPICAL at 20:57

## 2024-04-11 RX ADMIN — Medication 5000 UNITS: at 08:37

## 2024-04-11 RX ADMIN — LITHIUM CARBONATE 300 MG: 300 TABLET, EXTENDED RELEASE ORAL at 20:56

## 2024-04-11 RX ADMIN — LORATADINE 10 MG: 10 TABLET ORAL at 08:38

## 2024-04-11 RX ADMIN — TOPIRAMATE 150 MG: 25 TABLET, FILM COATED ORAL at 08:37

## 2024-04-11 RX ADMIN — GABAPENTIN 300 MG: 300 CAPSULE ORAL at 08:37

## 2024-04-11 RX ADMIN — LINACLOTIDE 1 CAPSULE: 290 CAPSULE, GELATIN COATED ORAL at 08:36

## 2024-04-11 RX ADMIN — METFORMIN HYDROCHLORIDE 500 MG: 500 TABLET ORAL at 08:37

## 2024-04-11 RX ADMIN — PANTOPRAZOLE SODIUM 40 MG: 40 TABLET, DELAYED RELEASE ORAL at 17:22

## 2024-04-11 RX ADMIN — LOTEPREDNOL ETABONATE 1 DROP: 5 SUSPENSION/ DROPS OPHTHALMIC at 08:39

## 2024-04-11 RX ADMIN — GABAPENTIN 900 MG: 600 TABLET, FILM COATED ORAL at 20:56

## 2024-04-11 RX ADMIN — CELECOXIB 200 MG: 100 CAPSULE ORAL at 08:37

## 2024-04-11 RX ADMIN — CELECOXIB 200 MG: 100 CAPSULE ORAL at 17:22

## 2024-04-11 RX ADMIN — CARIPRAZINE 6 MG: 3 CAPSULE, GELATIN COATED ORAL at 08:38

## 2024-04-11 RX ADMIN — VORTIOXETINE 5 MG: 10 TABLET, FILM COATED ORAL at 08:35

## 2024-04-11 RX ADMIN — B-COMPLEX W/ C & FOLIC ACID TAB 1 TABLET: TAB at 08:37

## 2024-04-11 RX ADMIN — ACETAZOLAMIDE 500 MG: 250 TABLET ORAL at 17:22

## 2024-04-11 RX ADMIN — TOPIRAMATE 150 MG: 25 TABLET, FILM COATED ORAL at 17:22

## 2024-04-11 RX ADMIN — SPIRONOLACTONE 25 MG: 25 TABLET ORAL at 08:38

## 2024-04-11 RX ADMIN — CYCLOSPORINE 1 DROP: 0 SOLUTION/ DROPS OPHTHALMIC; TOPICAL at 08:35

## 2024-04-11 RX ADMIN — GABAPENTIN 300 MG: 300 CAPSULE ORAL at 17:22

## 2024-04-11 RX ADMIN — ACETAZOLAMIDE 500 MG: 250 TABLET ORAL at 08:35

## 2024-04-11 RX ADMIN — LOTEPREDNOL ETABONATE 1 DROP: 5 SUSPENSION/ DROPS OPHTHALMIC at 20:57

## 2024-04-11 RX ADMIN — LEVOTHYROXINE SODIUM 112 MCG: 112 TABLET ORAL at 05:42

## 2024-04-11 RX ADMIN — ESCITALOPRAM OXALATE 5 MG: 5 TABLET, FILM COATED ORAL at 08:38

## 2024-04-11 RX ADMIN — LITHIUM CARBONATE 300 MG: 300 TABLET, EXTENDED RELEASE ORAL at 08:37

## 2024-04-11 NOTE — PLAN OF CARE
Problem: Ineffective Coping  Goal: Identifies ineffective coping skills  Outcome: Progressing  Goal: Identifies healthy coping skills  Outcome: Progressing  Goal: Participates in unit activities  Description: Interventions:  - Provide therapeutic environment   - Provide required programming   - Redirect inappropriate behaviors   Outcome: Progressing     Problem: Risk for Self Injury/Neglect  Goal: Refrain from harming self  Description: Interventions:  - Monitor patient closely, per order  - Develop a trusting relationship  - Supervise medication ingestion, monitor effects and side effects   Outcome: Progressing

## 2024-04-11 NOTE — NUTRITION
04/11/24 1348   Biochemical Data,Medical Tests, and Procedures   Biochemical Data/Medical Tests/Procedures Lab values reviewed;Meds reviewed   Labs (Comment) No new labs   Meds (Comment) diamox, cogentin, Vit D3, vraylar, lexapro, neurontin, atarax, levothyroxine, lithobid, claritin, ativan, metformin, zyprexa, zofran, protonix, wegovy, inderal, aldactone, desyrel   Nutrition-Focused Physical Exam   Nutrition-Focused Physical Exam Findings RN skin assessment reviewed;No skin issues documented   Medical-Related Concerns PMH reviewed   Adequacy of Intake   Nutrition Modality PO   Feeding Route   PO Independent   Current PO Intake   Current Diet Order Regular diet thin liquids   Current Meal Intake %   Estimated calorie intake compared to estimated need Nutrient needs now met.   PES Statement   Problem Continue previous diagnosis   Recommendations/Interventions   Malnutrition/BMI Present Yes   Adult BMI Classifications Morbid Obesity 50-59.9   Summary Regular diet thin liquids. No nutrition supplements. Meal completions improved and now %. 4/7 328#, BMI=51.50; 3# weight gain from admission 4/4 325#. No new labs. Medications reviewed. Skin intact. Per notes patient has fear of weight gain. She states her appetite is okay. Reports no issues with her meals. Ora discussed foods/fluids that cannot promote weight gain.   Interventions/Recommendations Continue current diet order   Education Assessment   Education Patient declined nutrition education;Patient/caregiver not appropriate for education at this time   Patient Nutrition Goals   Goal Adequate intake;Improve quality of diet;Meet PO needs   Goal Status Met;Extended   Timeframe to complete goal by next f/u   Nutrition Complexity Risk   Nutrition complexity level Low risk   Follow up date 04/25/24

## 2024-04-11 NOTE — PROGRESS NOTES
"Progress Note - Behavioral Health   Gita Rendon 38 y.o. female MRN: [id number]  Unit/Bed#: U 254-02 Encounter: [id number]    Assessment/Plan   Principal Problem:    Depression  Active Problems:    EILEEN (generalized anxiety disorder)    PTSD (post-traumatic stress disorder)      Behavior over the last 24 hours:  unchanged  Sleep: normal  Appetite: Fair  Medication side effects: No  ROS: headache and all other systems are negative    Elizabeth appears to be minimizing depressive symptoms.  Has been withdrawn and presents as disheveled.  Endorses ongoing headache.  Somewhat somatically preoccupied.  Thoughts remain obsessive but less negative.  Reports improving anxiety.  Encouraged to attend milieu activities.  Denies SI and has been maintaining safety on unit.    Mental Status Evaluation:  Appearance:  disheveled, overweight, and unkempt/greasy hair, lying in bed   Behavior:  Withdrawn, intermittent eye contact   Speech:  Monotone   Mood:  \"Not good\"     Affect:  blunted   Thought Process:  circumstantial   Associations: circumstantial associations   Thought Content:  obsessions and some somatic preoccupation, less negative   Perceptual Disturbances: Denied AVH, did not appear internally preoccupied   Risk Potential: Suicidal Ideations none  Homicidal Ideations none  Potential for Aggression No   Sensorium:  person, place, time/date, and situation   Memory:  recent and remote memory grossly intact   Consciousness:  alert and awake    Attention: attention span and concentration were age appropriate   Insight:  limited   Judgment: limited   Gait/Station: normal gait/station and normal balance   Motor Activity: no abnormal movements     Progress Toward Goals: Anxiety and ruminations improving.  Denies SI and has been maintaining safety.  Appears to be minimizing depressive symptoms.  Continuing cross taper from Trintellix to Lexapro.  Received last dose of Trintellix 5 mg daily this morning and will increase Lexapro 10 " mg daily starting tomorrow for management of depression, anxiety, and OCD.  Lithium level therapeutic at 0.6; will consider further titration if her depressive symptoms persist despite titration of Lexapro.  No discharge date at this time.    Recommended Treatment: Continue with group therapy, milieu therapy and occupational therapy.      Risks, benefits and possible side effects of Medications:   Risks, benefits, and possible side effects of medications explained to patient and patient verbalizes understanding.      Medications:  Increase Lexapro 10 mg PO QD  all current active meds have been reviewed and current meds:   Current Facility-Administered Medications   Medication Dose Route Frequency    acetaminophen (TYLENOL) tablet 650 mg  650 mg Oral Q6H PRN    acetaminophen (TYLENOL) tablet 650 mg  650 mg Oral Q4H PRN    acetaminophen (TYLENOL) tablet 975 mg  975 mg Oral Q6H PRN    acetaZOLAMIDE (DIAMOX) tablet 500 mg  500 mg Oral BID    aluminum-magnesium hydroxide-simethicone (MAALOX) oral suspension 30 mL  30 mL Oral Q4H PRN    benztropine (COGENTIN) tablet 1 mg  1 mg Oral Q6H PRN    cariprazine (VRAYLAR) capsule 6 mg  6 mg Oral Daily    celecoxib (CeleBREX) capsule 200 mg  200 mg Oral BID    Cholecalciferol (VITAMIN D3) tablet 5,000 Units  5,000 Units Oral Daily    cycloSPORINE (PF) 0.09 % SOLN 1 drop  1 drop Ophthalmic BID    hydrOXYzine HCL (ATARAX) tablet 50 mg  50 mg Oral Q6H PRN Max 4/day    Or    diphenhydrAMINE (BENADRYL) injection 50 mg  50 mg Intramuscular Q6H PRN    [START ON 4/12/2024] escitalopram (LEXAPRO) tablet 10 mg  10 mg Oral Daily    gabapentin (NEURONTIN) capsule 300 mg  300 mg Oral BID    gabapentin (NEURONTIN) tablet 900 mg  900 mg Oral HS    hydrOXYzine HCL (ATARAX) tablet 100 mg  100 mg Oral Q6H PRN Max 4/day    Or    LORazepam (ATIVAN) injection 2 mg  2 mg Intramuscular Q6H PRN    hydrOXYzine HCL (ATARAX) tablet 25 mg  25 mg Oral Q6H PRN Max 4/day    levothyroxine tablet 112 mcg  112 mcg  Oral Early Morning    linaCLOtide CAPS 1 capsule  290 mcg Oral Daily    lithium carbonate (LITHOBID) CR tablet 300 mg  300 mg Oral Q12H YFN    loratadine (CLARITIN) tablet 10 mg  10 mg Oral Daily    loteprednol etabonate (LOTEMAX) 0.5 % ophthalmic suspension 1 drop  1 drop Both Eyes BID    metFORMIN (GLUCOPHAGE) tablet 500 mg  500 mg Oral Daily With Breakfast    multivitamin stress formula tablet 1 tablet  1 tablet Oral Daily    OLANZapine (ZyPREXA) tablet 10 mg  10 mg Oral Q3H PRN Max 3/day    Or    OLANZapine (ZyPREXA) IM injection 10 mg  10 mg Intramuscular Q3H PRN Max 3/day    OLANZapine (ZyPREXA) tablet 5 mg  5 mg Oral Q3H PRN Max 6/day    Or    OLANZapine (ZyPREXA) IM injection 5 mg  5 mg Intramuscular Q3H PRN Max 6/day    OLANZapine (ZyPREXA) tablet 2.5 mg  2.5 mg Oral Q3H PRN Max 8/day    ondansetron (ZOFRAN-ODT) dispersible tablet 4 mg  4 mg Oral Q6H PRN    pantoprazole (PROTONIX) EC tablet 40 mg  40 mg Oral BID AC    polyethylene glycol (MIRALAX) packet 17 g  17 g Oral Daily PRN    propranolol (INDERAL) tablet 20 mg  20 mg Oral Daily    Semaglutide-Weight Management (WEGOVY) subcutaneous 1.7 mg  1.7 mg Subcutaneous Weekly    senna-docusate sodium (SENOKOT S) 8.6-50 mg per tablet 1 tablet  1 tablet Oral BID PRN    spironolactone (ALDACTONE) tablet 25 mg  25 mg Oral Daily    SUMAtriptan (IMITREX) tablet 100 mg  100 mg Oral Daily PRN    topiramate (TOPAMAX) tablet 150 mg  150 mg Oral BID    traZODone (DESYREL) tablet 100 mg  100 mg Oral HS PRN   .    Labs: I have personally reviewed all pertinent laboratory/tests results. CBC:   Lab Results   Component Value Date    WBC 9.55 04/05/2024    RBC 4.69 04/05/2024    HGB 13.4 04/05/2024    HCT 42.5 04/05/2024    MCV 91 04/05/2024     04/05/2024    MCH 28.6 04/05/2024    MCHC 31.5 04/05/2024    RDW 13.4 04/05/2024    MPV 10.9 04/05/2024    NEUTROABS 5.28 04/05/2024     CMP:   Lab Results   Component Value Date    SODIUM 138 04/06/2024    K 3.8 04/06/2024      (H) 04/06/2024    CO2 18 (L) 04/06/2024    AGAP 6 04/06/2024    BUN 17 04/06/2024    CREATININE 0.72 04/06/2024    GLUC 93 04/06/2024    GLUF 93 04/06/2024    CALCIUM 8.9 04/06/2024    AST 17 04/05/2024    ALT 16 04/05/2024    ALKPHOS 55 04/05/2024    TP 6.0 (L) 04/05/2024    ALB 3.6 04/05/2024    TBILI 0.31 04/05/2024    EGFR 105 04/06/2024     Lithium:   Lab Results   Component Value Date    LITHIUM 0.55 (L) 04/09/2024     Drug Screen:   Lab Results   Component Value Date    AMPMETHUR Positive (A) 04/03/2024    BARBTUR Negative 04/03/2024    BDZUR Negative 04/03/2024    THCUR Negative 04/03/2024    COCAINEUR Negative 04/03/2024    METHADONEUR Negative 04/03/2024    OPIATEUR Negative 04/03/2024    PCPUR Negative 04/03/2024     EKG   Lab Results   Component Value Date    VENTRATE 54 04/05/2024    ATRIALRATE 54 04/05/2024    PRINT 176 04/05/2024    QRSDINT 94 04/05/2024    QTINT 416 04/05/2024    QTCINT 394 04/05/2024    PAXIS 45 04/05/2024    QRSAXIS 6 04/05/2024    TWAVEAXIS 18 04/05/2024       Counseling / Coordination of Care  Total floor / unit time spent today 30 minutes. Greater than 50% of total time was spent with the patient and / or family counseling and / or coordination of care. A description of the counseling / coordination of care: Medication education, treatment plan, safety planning

## 2024-04-11 NOTE — SOCIAL WORK
04/11/24    Team Meeting   Meeting Type Daily Rounds Meeting   Team Members Present   Team Members Present Physician;Nurse;;Occupational Therapist   Physician Team Member Zina Myers MD   Nursing Team Member Chey, RN   Social Work Team Member ISA Kulkarni, MELISSA Galvan    Pharmacy  Analilia, Siddhartha   Patient/Family Present   Patient Present No   Patient's Family present No   Pt to return home with psych and ICM supports, high anx/dep, denies si/hi/avh reviewed meds, flat, quiet, engages with prompting and encouragement,

## 2024-04-11 NOTE — NURSING NOTE
Patient was quiet and cooperative. Patient was isolative and withdrawn this morning. Staff support provided. Q 7 minute safety checks maintained. Patient denied SI/HI. Patient depressed and flat. Patient continues to isolate at times. Patient compliant with medications and groups. Staff will continue to monitor and support.

## 2024-04-11 NOTE — PROGRESS NOTES
"Progress Note - Gita Rendon 38 y.o. female MRN: [id number]    Unit/Bed#: [facility] 254-02 Encounter: [id number]        Subjective:   Patient seen and examined at bedside after reviewing the chart and discussing the case with the caring staff.      Patient examined at bedside.  Patient has no acute symptoms.       Physical Exam   Vitals: Blood pressure 97/50, pulse 64, temperature 98.5 °F (36.9 °C), temperature source Temporal, resp. rate 18, height 5' 7\" (1.702 m), weight (!) 149 kg (328 lb 12.8 oz), last menstrual period 03/11/2024, SpO2 94%.,Body mass index is 51.5 kg/m².  Constitutional: Patient in no acute distress.  HEENT: PERR, EOMI, MMM.  Cardiovascular: Normal rate and regular rhythm.    Pulmonary/Chest: Effort normal and breath sounds normal.   Abdomen: Soft, + BS, NT.    Assessment/Plan:  Gita Rendon is a(n) 37 y.o. female with MDD.     1.  Hypothyroidism. TSH low 0.153 4/5/24. T4 pending. Decrease levothyroxine to 112 mcg. Follow up with PCP and repeat TSH in 6 weeks.   2.  Constipation.  Continue Linzess 290 mcg daily (brought from home). Miralax and Senokot S as needed.  3.  Allergic rhinitis.  Claritin 10 mg daily.  4.  GERD.  Protonix 40 mg daily (omeprazole nonformulary).  5.  Chronic headaches.  Continue Topamax 150 mg twice daily, propranolol 20 mg twice daily, Imitrex 100 mg as needed.   6.  Chronic back pain.  Patient is on gabapentin 600 mg twice daily, Celebrex 200 mg twice daily.  Patient may use extra pillow for sleep.   7.  Dry eyes.  Continue cyclosporine and Lotemax eye drops (brought from home).  8.  Morbid obesity/prediabetes.  Continue metformin 500 mg daily.  Hgb a1c 5.3% on 4/5/2024. Patient taking Wygovy every Wednesday non formulary.  9.  Hyperaldosteronism.  Patient on spironolactone 25 mg daily.   10. Intracranial HTN. Patient on Diamox 250 mg daily.  11. Vitamin D deficiency. Continue daily supplement.   12. Hypokalemia. Patient's potassium 3.3 on 4/5. One time dose of " potassium chloride 40 mEq. Repeat BMP normal, K 3.8 4/6/24.     The patient was discussed with Dr. Liao and he is in agreement with the above note.

## 2024-04-11 NOTE — NUTRITION
04/11/24 1348   Biochemical Data,Medical Tests, and Procedures   Biochemical Data/Medical Tests/Procedures Lab values reviewed;Meds reviewed   Labs (Comment) No new labs   Meds (Comment) diamox, cogentin, Vit D3, vraylar, lexapro, neurontin, atarax, levothyroxine, lithobid, claritin, ativan, metformin, zyprexa, zofran, protonix, wegovy, inderal, aldactone, desyrel   Nutrition-Focused Physical Exam   Nutrition-Focused Physical Exam Findings RN skin assessment reviewed;No skin issues documented   Medical-Related Concerns PMH reviewed   Adequacy of Intake   Nutrition Modality PO   Feeding Route   PO Independent   Current PO Intake   Current Diet Order Regular diet thin liquids   Current Meal Intake %   Estimated calorie intake compared to estimated need Nutrient needs now met.   PES Statement   Problem Continue previous diagnosis   Recommendations/Interventions   Malnutrition/BMI Present Yes   Adult BMI Classifications Morbid Obesity 50-59.9   Summary Regular diet thin liquids. No nutrition supplements. Meal completions improved and now %. 4/7 328#, BMI=51.50; 3# weight gain from admission 4/4 325#. No new labs. Medications reviewed. Skin intact. Per notes patient has fear of weight gain. She states her appetite is okay. Reports no issues with her meals. Ora discussed foods/fluids that can promote weight gain.   Interventions/Recommendations Continue current diet order   Education Assessment   Education Patient declined nutrition education;Patient/caregiver not appropriate for education at this time   Patient Nutrition Goals   Goal Adequate intake;Improve quality of diet;Meet PO needs   Goal Status Met;Extended   Timeframe to complete goal by next f/u   Nutrition Complexity Risk   Nutrition complexity level Low risk   Follow up date 04/25/24

## 2024-04-11 NOTE — NURSING NOTE
Patient is alert, verbal, and oriented in conversation. She spent most of the evening in her room sleeping since assuming care. She was cooperative with this nurse. Compliant with HS medications. She denied suicidal or homicidal thoughts. Conversation limited due to patient sleeping much of the evening. Q 7 minute safety checks maintained. Will continue with plan of care.

## 2024-04-12 PROCEDURE — 99232 SBSQ HOSP IP/OBS MODERATE 35: CPT | Performed by: NURSE PRACTITIONER

## 2024-04-12 RX ORDER — LITHIUM CARBONATE 450 MG
450 TABLET, EXTENDED RELEASE ORAL EVERY 12 HOURS SCHEDULED
Status: DISCONTINUED | OUTPATIENT
Start: 2024-04-12 | End: 2024-04-15

## 2024-04-12 RX ADMIN — SPIRONOLACTONE 25 MG: 25 TABLET ORAL at 08:27

## 2024-04-12 RX ADMIN — LITHIUM CARBONATE 300 MG: 300 TABLET, EXTENDED RELEASE ORAL at 09:17

## 2024-04-12 RX ADMIN — LORATADINE 10 MG: 10 TABLET ORAL at 09:17

## 2024-04-12 RX ADMIN — GABAPENTIN 300 MG: 300 CAPSULE ORAL at 09:17

## 2024-04-12 RX ADMIN — CELECOXIB 200 MG: 100 CAPSULE ORAL at 17:31

## 2024-04-12 RX ADMIN — CARIPRAZINE 6 MG: 3 CAPSULE, GELATIN COATED ORAL at 08:27

## 2024-04-12 RX ADMIN — PANTOPRAZOLE SODIUM 40 MG: 40 TABLET, DELAYED RELEASE ORAL at 17:31

## 2024-04-12 RX ADMIN — ESCITALOPRAM OXALATE 10 MG: 10 TABLET ORAL at 08:27

## 2024-04-12 RX ADMIN — LINACLOTIDE 1 CAPSULE: 290 CAPSULE, GELATIN COATED ORAL at 08:26

## 2024-04-12 RX ADMIN — PANTOPRAZOLE SODIUM 40 MG: 40 TABLET, DELAYED RELEASE ORAL at 08:27

## 2024-04-12 RX ADMIN — LITHIUM CARBONATE 450 MG: 450 TABLET, EXTENDED RELEASE ORAL at 20:46

## 2024-04-12 RX ADMIN — LOTEPREDNOL ETABONATE 1 DROP: 5 SUSPENSION/ DROPS OPHTHALMIC at 08:26

## 2024-04-12 RX ADMIN — HYDROXYZINE HYDROCHLORIDE 100 MG: 50 TABLET, FILM COATED ORAL at 11:21

## 2024-04-12 RX ADMIN — ACETAZOLAMIDE 500 MG: 250 TABLET ORAL at 17:34

## 2024-04-12 RX ADMIN — B-COMPLEX W/ C & FOLIC ACID TAB 1 TABLET: TAB at 08:27

## 2024-04-12 RX ADMIN — GABAPENTIN 900 MG: 600 TABLET, FILM COATED ORAL at 20:46

## 2024-04-12 RX ADMIN — ACETAZOLAMIDE 500 MG: 250 TABLET ORAL at 08:25

## 2024-04-12 RX ADMIN — TOPIRAMATE 150 MG: 25 TABLET, FILM COATED ORAL at 08:27

## 2024-04-12 RX ADMIN — CYCLOSPORINE 1 DROP: 0 SOLUTION/ DROPS OPHTHALMIC; TOPICAL at 08:25

## 2024-04-12 RX ADMIN — METFORMIN HYDROCHLORIDE 500 MG: 500 TABLET ORAL at 08:27

## 2024-04-12 RX ADMIN — LEVOTHYROXINE SODIUM 112 MCG: 112 TABLET ORAL at 06:16

## 2024-04-12 RX ADMIN — CYCLOSPORINE 1 DROP: 0 SOLUTION/ DROPS OPHTHALMIC; TOPICAL at 20:47

## 2024-04-12 RX ADMIN — Medication 5000 UNITS: at 08:27

## 2024-04-12 RX ADMIN — TOPIRAMATE 150 MG: 25 TABLET, FILM COATED ORAL at 17:31

## 2024-04-12 RX ADMIN — LOTEPREDNOL ETABONATE 1 DROP: 5 SUSPENSION/ DROPS OPHTHALMIC at 20:47

## 2024-04-12 RX ADMIN — CELECOXIB 200 MG: 100 CAPSULE ORAL at 08:27

## 2024-04-12 RX ADMIN — PROPRANOLOL HYDROCHLORIDE 20 MG: 20 TABLET ORAL at 20:46

## 2024-04-12 RX ADMIN — GABAPENTIN 300 MG: 300 CAPSULE ORAL at 17:31

## 2024-04-12 NOTE — TREATMENT TEAM
04/10/24 1646   Activity/Group Checklist   Group Nursing Education   Attendance Did not attend       
   04/12/24 1120   Brothers Anxiety Scale   Anxious Mood 4   Tension 4   Fears 0   Insomnia 0   Intellectual 4   Depressed Mood 4   Somatic Complaints: Muscular 0   Somatic Complaints: Sensory 3   Cardiovascular Symptoms 0   Respiratory Symptoms 0   Gastrointestinal Symptoms 0   Genitourinary Symptoms 0   Autonomic Symptoms 4   Behavior at Interview 4   Brothers Anxiety Score 27     Patient  observe tearful . Report increase  anxiety ,unable  to identify  trigger. Administered  atarax 100 mg.  
Universal Safety Interventions

## 2024-04-12 NOTE — PROGRESS NOTES
"Progress Note - Behavioral Health   Gita Rendon 38 y.o. female MRN: [id number]  Unit/Bed#: [facility] 254-02 Encounter: [id number]    Assessment/Plan   Principal Problem:    Depression  Active Problems:    EILEEN (generalized anxiety disorder)    PTSD (post-traumatic stress disorder)      Behavior over the last 24 hours:  unchanged  Sleep: normal  Appetite: Fair  Medication side effects: No  ROS: headache and all other systems are negative    Elizabeth remains isolative and withdrawn.  Vague in answering assessment questions today and continues to minimize depressive symptoms.  Reports no improvement with anxiety.  Somatically preoccupied and endorses ongoing worry \"about stupid things\" such as showering.  Describes low energy, amotivation, feelings of guilt and helplessness, depression, and fleeting SI with no plan x 1 yesterday.  Denies AVH, nor did patient appear preoccupied.    Mental Status Evaluation:  Appearance:  disheveled and overweight   Behavior:  Guarded, tearful, intermittent eye contact , withdrawn   Speech:  Slow, scant   Mood:  anxious   Affect:  blunted and dysphoric   Thought Process:  circumstantial   Associations: perseverative   Thought Content:  Somatically preoccupied, negative thinking, worry/obsessions   Perceptual Disturbances: Denies AVH, did not appear internally preoccupied   Risk Potential: Suicidal Ideations none at present  Homicidal Ideations none  Potential for Aggression No   Sensorium:  person, place, time/date, and situation   Memory:  recent and remote memory grossly intact   Consciousness:  alert and awake    Attention: attention span and concentration were age appropriate   Insight:  limited   Judgment: limited   Gait/Station: normal gait/station and normal balance   Motor Activity: no abnormal movements     Progress Toward Goals: Continues to endorse anxiety, ruminations, and worry.  Dysphoric and somatically preoccupied today.  Reports fleeting thoughts of SI with no plan yesterday.  " Able to contract for safety on unit.  Appears increasingly depressed and withdrawn.  Plan is to titrate lithium  mg PO BID and obtain lithium level and BMP Tuesday, 4/16/2024 (combination lithium and Celebrex therapy).  Lexapro recently increased to 10 mg daily for treatment of anxiety, depression, and OCD.  No discharge date at this time.    Recommended Treatment: Continue with group therapy, milieu therapy and occupational therapy.      Risks, benefits and possible side effects of Medications:   Risks, benefits, and possible side effects of medications explained to patient and patient verbalizes understanding.      Medications:   Increase lithium CR 450mg PO BID   Obtain lithium level and BMP 4/16/24 at 6 AM  all current active meds have been reviewed and current meds:   Current Facility-Administered Medications   Medication Dose Route Frequency    acetaminophen (TYLENOL) tablet 650 mg  650 mg Oral Q6H PRN    acetaminophen (TYLENOL) tablet 650 mg  650 mg Oral Q4H PRN    acetaminophen (TYLENOL) tablet 975 mg  975 mg Oral Q6H PRN    acetaZOLAMIDE (DIAMOX) tablet 500 mg  500 mg Oral BID    aluminum-magnesium hydroxide-simethicone (MAALOX) oral suspension 30 mL  30 mL Oral Q4H PRN    benztropine (COGENTIN) tablet 1 mg  1 mg Oral Q6H PRN    cariprazine (VRAYLAR) capsule 6 mg  6 mg Oral Daily    celecoxib (CeleBREX) capsule 200 mg  200 mg Oral BID    Cholecalciferol (VITAMIN D3) tablet 5,000 Units  5,000 Units Oral Daily    cycloSPORINE (PF) 0.09 % SOLN 1 drop  1 drop Ophthalmic BID    hydrOXYzine HCL (ATARAX) tablet 50 mg  50 mg Oral Q6H PRN Max 4/day    Or    diphenhydrAMINE (BENADRYL) injection 50 mg  50 mg Intramuscular Q6H PRN    escitalopram (LEXAPRO) tablet 10 mg  10 mg Oral Daily    gabapentin (NEURONTIN) capsule 300 mg  300 mg Oral BID    gabapentin (NEURONTIN) tablet 900 mg  900 mg Oral HS    hydrOXYzine HCL (ATARAX) tablet 100 mg  100 mg Oral Q6H PRN Max 4/day    Or    LORazepam (ATIVAN) injection 2 mg   2 mg Intramuscular Q6H PRN    hydrOXYzine HCL (ATARAX) tablet 25 mg  25 mg Oral Q6H PRN Max 4/day    levothyroxine tablet 112 mcg  112 mcg Oral Early Morning    linaCLOtide CAPS 1 capsule  290 mcg Oral Daily    lithium carbonate (LITHOBID) CR tablet 450 mg  450 mg Oral Q12H YFN    loratadine (CLARITIN) tablet 10 mg  10 mg Oral Daily    loteprednol etabonate (LOTEMAX) 0.5 % ophthalmic suspension 1 drop  1 drop Both Eyes BID    metFORMIN (GLUCOPHAGE) tablet 500 mg  500 mg Oral Daily With Breakfast    multivitamin stress formula tablet 1 tablet  1 tablet Oral Daily    OLANZapine (ZyPREXA) tablet 10 mg  10 mg Oral Q3H PRN Max 3/day    Or    OLANZapine (ZyPREXA) IM injection 10 mg  10 mg Intramuscular Q3H PRN Max 3/day    OLANZapine (ZyPREXA) tablet 5 mg  5 mg Oral Q3H PRN Max 6/day    Or    OLANZapine (ZyPREXA) IM injection 5 mg  5 mg Intramuscular Q3H PRN Max 6/day    OLANZapine (ZyPREXA) tablet 2.5 mg  2.5 mg Oral Q3H PRN Max 8/day    ondansetron (ZOFRAN-ODT) dispersible tablet 4 mg  4 mg Oral Q6H PRN    pantoprazole (PROTONIX) EC tablet 40 mg  40 mg Oral BID AC    polyethylene glycol (MIRALAX) packet 17 g  17 g Oral Daily PRN    propranolol (INDERAL) tablet 20 mg  20 mg Oral Daily    Semaglutide-Weight Management (WEGOVY) subcutaneous 1.7 mg  1.7 mg Subcutaneous Weekly    senna-docusate sodium (SENOKOT S) 8.6-50 mg per tablet 1 tablet  1 tablet Oral BID PRN    spironolactone (ALDACTONE) tablet 25 mg  25 mg Oral Daily    SUMAtriptan (IMITREX) tablet 100 mg  100 mg Oral Daily PRN    topiramate (TOPAMAX) tablet 150 mg  150 mg Oral BID    traZODone (DESYREL) tablet 100 mg  100 mg Oral HS PRN   .    Labs: I have personally reviewed all pertinent laboratory/tests results. CBC:   Lab Results   Component Value Date    WBC 9.55 04/05/2024    RBC 4.69 04/05/2024    HGB 13.4 04/05/2024    HCT 42.5 04/05/2024    MCV 91 04/05/2024     04/05/2024    MCH 28.6 04/05/2024    MCHC 31.5 04/05/2024    RDW 13.4 04/05/2024    MPV  10.9 04/05/2024    NEUTROABS 5.28 04/05/2024     CMP:   Lab Results   Component Value Date    SODIUM 138 04/06/2024    K 3.8 04/06/2024     (H) 04/06/2024    CO2 18 (L) 04/06/2024    AGAP 6 04/06/2024    BUN 17 04/06/2024    CREATININE 0.72 04/06/2024    GLUC 93 04/06/2024    GLUF 93 04/06/2024    CALCIUM 8.9 04/06/2024    AST 17 04/05/2024    ALT 16 04/05/2024    ALKPHOS 55 04/05/2024    TP 6.0 (L) 04/05/2024    ALB 3.6 04/05/2024    TBILI 0.31 04/05/2024    EGFR 105 04/06/2024     Lithium:   Lab Results   Component Value Date    LITHIUM 0.55 (L) 04/09/2024     EKG   Lab Results   Component Value Date    VENTRATE 54 04/05/2024    ATRIALRATE 54 04/05/2024    PRINT 176 04/05/2024    QRSDINT 94 04/05/2024    QTINT 416 04/05/2024    QTCINT 394 04/05/2024    PAXIS 45 04/05/2024    QRSAXIS 6 04/05/2024    TWAVEAXIS 18 04/05/2024       Counseling / Coordination of Care  Total floor / unit time spent today 30 minutes. Greater than 50% of total time was spent with the patient and / or family counseling and / or coordination of care. A description of the counseling / coordination of care: Medication education, treatment plan, supportive therapy, safety planning

## 2024-04-12 NOTE — NURSING NOTE
Patient is alert, verbal, and oriented in conversation. She spent most of the evening in her room. She was cooperative with this nurse. Compliant with HS medications. She denied suicidal or homicidal thoughts. She reports she feels groggy due to migraine that she had had earlier in the day and generally not feeling well due to migraines. Denies needing any additional medications. Q 7 minute safety checks maintained. Will continue with plan of care.

## 2024-04-12 NOTE — NURSING NOTE
Patient withdrawn.Isolate to self.Alert and Oriented.Pleasant and cooperative.Schedule PO medication administered as ordered.Denies  hallucination, HI,SI.Appetite good. Continue on safety check.

## 2024-04-12 NOTE — PLAN OF CARE
Problem: Ineffective Coping  Goal: Identifies ineffective coping skills  Outcome: Progressing  Goal: Identifies healthy coping skills  Outcome: Progressing  Goal: Participates in unit activities  Description: Interventions:  - Provide therapeutic environment   - Provide required programming   - Redirect inappropriate behaviors   Outcome: Progressing     Problem: Risk for Self Injury/Neglect  Goal: Refrain from harming self  Description: Interventions:  - Monitor patient closely, per order  - Develop a trusting relationship  - Supervise medication ingestion, monitor effects and side effects   Outcome: Progressing     Problem: Depression  Goal: Refrain from self-neglect  Outcome: Progressing

## 2024-04-12 NOTE — SOCIAL WORK
04/12/24    Team Meeting   Meeting Type Daily Rounds Meeting   Team Members Present   Team Members Present Physician;Nurse;;Occupational Therapist   Physician Team Member Zina Myers MD   Nursing Team Member ANAHI Mcgill   Social Work Team Member ISA Kulkarni       Patient/Family Present   Patient Present No   Patient's Family present No   Flat, dep/anx, dishelved, monotone, withdrawn to room, encouragement to engage

## 2024-04-12 NOTE — PROGRESS NOTES
"Progress Note - Gita Rendon 38 y.o. female MRN: [id number]    Unit/Bed#: [facility] 254-02 Encounter: [id number]        Subjective:   Patient seen and examined at bedside after reviewing the chart and discussing the case with the caring staff.      Patient examined at bedside.  Patient has no acute symptoms.       Physical Exam   Vitals: Blood pressure 114/66, pulse 55, temperature 98.7 °F (37.1 °C), temperature source Temporal, resp. rate 18, height 5' 7\" (1.702 m), weight (!) 149 kg (328 lb 12.8 oz), last menstrual period 03/11/2024, SpO2 95%.,Body mass index is 51.5 kg/m².  Constitutional: Patient in no acute distress.  HEENT: PERR, EOMI, MMM.  Cardiovascular: Normal rate and regular rhythm.    Pulmonary/Chest: Effort normal and breath sounds normal.   Abdomen: Soft, + BS, NT.    Assessment/Plan:  Gita Rendon is a(n) 37 y.o. female with MDD.     1.  Hypothyroidism. TSH low 0.153 4/5/24. T4 pending. Decrease levothyroxine to 112 mcg. Follow up with PCP and repeat TSH in 6 weeks.   2.  Constipation.  Continue Linzess 290 mcg daily (brought from home). Miralax and Senokot S as needed.  3.  Allergic rhinitis.  Claritin 10 mg daily.  4.  GERD.  Protonix 40 mg daily (omeprazole nonformulary).  5.  Chronic headaches.  Continue Topamax 150 mg twice daily, propranolol 20 mg twice daily, Imitrex 100 mg as needed.   6.  Chronic back pain.  Patient is on gabapentin 600 mg twice daily, Celebrex 200 mg twice daily.  Patient may use extra pillow for sleep.   7.  Dry eyes.  Continue cyclosporine and Lotemax eye drops (brought from home).  8.  Morbid obesity/prediabetes.  Continue metformin 500 mg daily.  Hgb a1c 5.3% on 4/5/2024. Patient taking Wygovy every Wednesday non formulary.  9.  Hyperaldosteronism.  Patient on spironolactone 25 mg daily.   10. Intracranial HTN. Patient on Diamox 250 mg daily.  11. Vitamin D deficiency. Continue daily supplement.   12. Hypokalemia. Patient's potassium 3.3 on 4/5. One time dose of " potassium chloride 40 mEq. Repeat BMP normal, K 3.8 4/6/24.     The patient was discussed with Dr. Liao and he is in agreement with the above note.

## 2024-04-12 NOTE — SOCIAL WORK
"CM placed return call to Claire [facility] [phone]. CM reviewed progress.  Claire verbalized understanding and in agreement. CM in agreement to continue collaboration during pt tx stay.      CM spoke to pt Svetlana Turcios-pt roommate [phone]. CM provided update on progress. Svetlana stated that pt sounds anxious however noted pt is \"always anxious\".  CM reviewed tx plan and goals. Svetlana verbalized understanding and in agreement.    "

## 2024-04-13 PROCEDURE — 99232 SBSQ HOSP IP/OBS MODERATE 35: CPT | Performed by: NURSE PRACTITIONER

## 2024-04-13 RX ADMIN — CYCLOSPORINE 1 DROP: 0 SOLUTION/ DROPS OPHTHALMIC; TOPICAL at 09:03

## 2024-04-13 RX ADMIN — LITHIUM CARBONATE 450 MG: 450 TABLET, EXTENDED RELEASE ORAL at 21:27

## 2024-04-13 RX ADMIN — LEVOTHYROXINE SODIUM 112 MCG: 112 TABLET ORAL at 05:39

## 2024-04-13 RX ADMIN — GABAPENTIN 300 MG: 300 CAPSULE ORAL at 09:02

## 2024-04-13 RX ADMIN — CYCLOSPORINE 1 DROP: 0 SOLUTION/ DROPS OPHTHALMIC; TOPICAL at 21:17

## 2024-04-13 RX ADMIN — PROPRANOLOL HYDROCHLORIDE 20 MG: 20 TABLET ORAL at 21:18

## 2024-04-13 RX ADMIN — ACETAZOLAMIDE 500 MG: 250 TABLET ORAL at 09:04

## 2024-04-13 RX ADMIN — CARIPRAZINE 6 MG: 3 CAPSULE, GELATIN COATED ORAL at 09:02

## 2024-04-13 RX ADMIN — LOTEPREDNOL ETABONATE 1 DROP: 5 SUSPENSION/ DROPS OPHTHALMIC at 09:03

## 2024-04-13 RX ADMIN — SPIRONOLACTONE 25 MG: 25 TABLET ORAL at 09:02

## 2024-04-13 RX ADMIN — METFORMIN HYDROCHLORIDE 500 MG: 500 TABLET ORAL at 09:03

## 2024-04-13 RX ADMIN — ONDANSETRON 4 MG: 4 TABLET, ORALLY DISINTEGRATING ORAL at 12:06

## 2024-04-13 RX ADMIN — PANTOPRAZOLE SODIUM 40 MG: 40 TABLET, DELAYED RELEASE ORAL at 16:15

## 2024-04-13 RX ADMIN — GABAPENTIN 900 MG: 600 TABLET, FILM COATED ORAL at 21:27

## 2024-04-13 RX ADMIN — TOPIRAMATE 150 MG: 25 TABLET, FILM COATED ORAL at 09:02

## 2024-04-13 RX ADMIN — ACETAZOLAMIDE 500 MG: 250 TABLET ORAL at 18:03

## 2024-04-13 RX ADMIN — Medication 5000 UNITS: at 09:02

## 2024-04-13 RX ADMIN — CELECOXIB 200 MG: 100 CAPSULE ORAL at 09:02

## 2024-04-13 RX ADMIN — TOPIRAMATE 150 MG: 25 TABLET, FILM COATED ORAL at 18:03

## 2024-04-13 RX ADMIN — LINACLOTIDE 1 CAPSULE: 290 CAPSULE, GELATIN COATED ORAL at 09:03

## 2024-04-13 RX ADMIN — B-COMPLEX W/ C & FOLIC ACID TAB 1 TABLET: TAB at 09:02

## 2024-04-13 RX ADMIN — GABAPENTIN 300 MG: 300 CAPSULE ORAL at 16:15

## 2024-04-13 RX ADMIN — LITHIUM CARBONATE 450 MG: 450 TABLET, EXTENDED RELEASE ORAL at 09:02

## 2024-04-13 RX ADMIN — ESCITALOPRAM OXALATE 10 MG: 10 TABLET ORAL at 09:02

## 2024-04-13 RX ADMIN — LORATADINE 10 MG: 10 TABLET ORAL at 09:02

## 2024-04-13 RX ADMIN — LOTEPREDNOL ETABONATE 1 DROP: 5 SUSPENSION/ DROPS OPHTHALMIC at 21:17

## 2024-04-13 RX ADMIN — CELECOXIB 200 MG: 100 CAPSULE ORAL at 18:03

## 2024-04-13 RX ADMIN — PANTOPRAZOLE SODIUM 40 MG: 40 TABLET, DELAYED RELEASE ORAL at 05:39

## 2024-04-13 NOTE — PROGRESS NOTES
"Progress Note - Gita Rendon 38 y.o. female MRN: [id number]    Unit/Bed#: [facility] 254-02 Encounter: [id number]        Subjective:   Patient seen and examined at bedside after reviewing the chart and discussing the case with the caring staff.      Patient examined at bedside.  Patient has no acute symptoms.     Physical Exam   Vitals: Blood pressure 106/74, pulse 92, temperature 97.7 °F (36.5 °C), temperature source Temporal, resp. rate 16, height 5' 7\" (1.702 m), weight (!) 149 kg (328 lb 12.8 oz), last menstrual period 03/11/2024, SpO2 96%.,Body mass index is 51.5 kg/m².  Constitutional: Patient in no acute distress.  HEENT: PERR, EOMI, MMM.  Cardiovascular: Normal rate and regular rhythm.    Pulmonary/Chest: Effort normal and breath sounds normal.   Abdomen: Soft, + BS, NT.    Assessment/Plan:  Gita Rendon is a(n) 37 y.o. female with MDD.     1.  Hypothyroidism. TSH low 0.153 4/5/24. T4 pending. Decrease levothyroxine to 112 mcg. Follow up with PCP and repeat TSH in 6 weeks.   2.  Constipation.  Continue Linzess 290 mcg daily (brought from home). Miralax and Senokot S as needed.  3.  Allergic rhinitis.  Claritin 10 mg daily.  4.  GERD.  Protonix 40 mg daily (omeprazole nonformulary).  5.  Chronic headaches.  Continue Topamax 150 mg twice daily, propranolol 20 mg twice daily, Imitrex 100 mg as needed.   6.  Chronic back pain.  Patient is on gabapentin 600 mg twice daily, Celebrex 200 mg twice daily.  Patient may use extra pillow for sleep.   7.  Dry eyes.  Continue cyclosporine and Lotemax eye drops (brought from home).  8.  Morbid obesity/prediabetes.  Continue metformin 500 mg daily.  Hgb a1c 5.3% on 4/5/2024.  Patient taking Wygovy every Wednesday non formulary.  9.  Hyperaldosteronism.  Patient on spironolactone 25 mg daily.   10. Intracranial HTN.  Patient on Diamox 250 mg daily.  11. Vitamin D deficiency.  Continue daily supplement.   12. Hypokalemia.  Patient's potassium 3.3 on 4/5. One time dose of " potassium chloride 40 mEq.  Repeat BMP normal, K 3.8 4/6/24.     The patient was discussed with Dr. Liao and he is in agreement with the above note.

## 2024-04-13 NOTE — NURSING NOTE
Patient is alert, verbal, and oriented in conversation. She reports she feels her depression is worsening because she has a complete lack of motivation. She is fearful her medication to lexapro may not be enough for her depression. She was cooperative with this nurse in conversation. Compliant with HS medications. She denied suicidal or homicidal thoughts. She reports she is also feeling anxious because she is unsure of what appointments she has coming up and her ability to cancel them while on the unit. Patient feels that she has a lack of control while being on the unit. Active listening and emotional support was provided. Denies needing any additional medications. Q 7 minute safety checks maintained. Will continue with plan of care.

## 2024-04-13 NOTE — PROGRESS NOTES
Progress Note - Behavioral Health   Gita Rendon 38 y.o. female MRN: [id number]  Unit/Bed#: [facility] 254-02 Encounter: [id number]    Assessment/Plan   Principal Problem:    Depression  Active Problems:    EILEEN (generalized anxiety disorder)    PTSD (post-traumatic stress disorder)      Subjective:Patient was seen today for continuation of care, records reviewed and  patient was discussed with the morning case review team.    Elizabeth seen today for psychiatric follow up. Reports that she has had a headache for the past few days, unknown origin.  She states that she follows neurology at [place] for migraine management.  She states today the headache has improved, reports her depression is rated at 4 out of 10, 10 being worst.  Denies any suicidal or homicidal ideation, reports good sleep, good appetite.  Denies auditory and visual hallucinations.  She is anxious about everything she needs to do when she returns home, including rescheduling some appointments that she has missed while she was inpatient here.  She is hopeful that case management can help her reschedule the [place] appointments and explain to them why she no showed.  She states that they do have a very strict no-show policy.  She is otherwise calm, pleasant, appropriate in conversation.  She is compliant with her medications, denies any side effects.  No agitation or aggression noted.    Psychiatric Review of Systems:    Sleep: normal  Appetite: normal  Medication side effects: No   ROS: reports improved headache, all other systems are negative    Vitals:  Vitals:    04/13/24 0719   BP: 106/74   Pulse: 92   Resp: 16   Temp: 97.7 °F (36.5 °C)   SpO2: 96%       Mental Status Evaluation:    Appearance:  casually dressed, dressed appropriately, obese female   Behavior:  pleasant, cooperative, calm   Speech:  normal rate and volume   Mood:  euthymic   Affect:  mood-congruent   Thought Process:  goal directed, linear   Associations intact associations    Thought Content:  no overt delusions   Perceptual Disturbances: does not appear responding to internal stimuli, denies auditory or visual hallucinations when asked   Risk Potential: Suicidal ideation - None  Homicidal ideation - None  Potential for aggression - No   Sensorium:  oriented to person, place, and time/date   Memory:  recent and remote memory grossly intact   Consciousness:  alert and awake   Attention: attention span and concentration are age appropriate   Insight:  fair   Judgment: fair   Gait/Station: normal gait/station   Motor Activity: no abnormal movements     Laboratory results:  I have personally reviewed all pertinent laboratory/tests results.  Most Recent Labs:   Lab Results   Component Value Date    WBC 9.55 04/05/2024    RBC 4.69 04/05/2024    HGB 13.4 04/05/2024    HCT 42.5 04/05/2024     04/05/2024    RDW 13.4 04/05/2024    TOTANEUTABS 7.02 05/27/2020    NEUTROABS 5.28 04/05/2024    SODIUM 138 04/06/2024    K 3.8 04/06/2024     (H) 04/06/2024    CO2 18 (L) 04/06/2024    BUN 17 04/06/2024    CREATININE 0.72 04/06/2024    GLUC 93 04/06/2024    GLUF 93 04/06/2024    CALCIUM 8.9 04/06/2024    AST 17 04/05/2024    ALT 16 04/05/2024    ALKPHOS 55 04/05/2024    TP 6.0 (L) 04/05/2024    ALB 3.6 04/05/2024    TBILI 0.31 04/05/2024    CHOLESTEROL 126 04/05/2024    HDL 36 (L) 04/05/2024    TRIG 88 04/05/2024    LDLCALC 72 04/05/2024    NONHDLC 90 04/05/2024    LITHIUM 0.55 (L) 04/09/2024    HKR7VSWCAMCS 0.153 (L) 04/05/2024    FREET4 0.89 04/05/2024    PREGUR negative 06/16/2020    PREGSERUM Negative 01/09/2018    HCGQUANT <2 06/01/2022    RPR Non-Reactive 01/24/2019    HGBA1C 5.3 04/05/2024     04/05/2024       Progress Toward Goals:     Patient is progressing towards goals of inpatient psychiatric treatment by continued medication compliance and is attending therapeutic modalities on the milieu. However, the patient continues to require inpatient psychiatric hospitalization for  continued medication management and titration to optimize symptom reduction, improve sleep hygiene, and demonstrate adequate self-care.      Recommended Treatment:     All current active medications have been reviewed  Encourage group therapy, milieu therapy and occupational therapy  Behavioral Health checks every 7 minutes  Discharge planning remains ongoing  Safety checks and vitals per unit protocol  Continue with medical management as indicated  Will review labs in the morning  Lithium Level and BMP due on 4/16/24 at 6am    Continue current medications:  Current Facility-Administered Medications   Medication Dose Route Frequency Provider Last Rate    acetaminophen  650 mg Oral Q6H PRN Rogelio Smith MD      acetaminophen  650 mg Oral Q4H PRN Lizyd Ethan Smith MD      acetaminophen  975 mg Oral Q6H PRN Rogelio Smith MD      acetaZOLAMIDE  500 mg Oral BID Rogelio Smith MD      aluminum-magnesium hydroxide-simethicone  30 mL Oral Q4H PRN Rogelio Smith MD      benztropine  1 mg Oral Q6H PRN Rogelio Smith MD      cariprazine  6 mg Oral Daily Rogelio Smith MD      celecoxib  200 mg Oral BID Rogelio Smith MD      Cholecalciferol  5,000 Units Oral Daily Rogelio Smith MD      cycloSPORINE (PF)  1 drop Ophthalmic BID Rogelio Smith MD      hydrOXYzine HCL  50 mg Oral Q6H PRN Max 4/day Rogelio Smith MD      Or    diphenhydrAMINE  50 mg Intramuscular Q6H PRN Rogelio Smith MD      escitalopram  10 mg Oral Daily Claudine MILLER Medei, CRNP      gabapentin  300 mg Oral BID Claudine Izaguirre, CRNP      gabapentin  900 mg Oral HS Rogelio Smith MD      hydrOXYzine HCL  100 mg Oral Q6H PRN Max 4/day Rogelio Smith MD      Or    LORazepam  2 mg Intramuscular Q6H PRN Rogelio Smith MD      hydrOXYzine HCL  25 mg  Oral Q6H PRN Max 4/day Khaled Ethan Smith MD      levothyroxine  112 mcg Oral Early Morning Pachecoaled Ethan Smith MD      linaCLOtide  290 mcg Oral Daily Pachecoaled Ethan Smith MD      lithium carbonate  450 mg Oral Q12H LEIGHA Roth      loratadine  10 mg Oral Daily Pachecoaled Ethan Smith MD      loteprednol etabonate  1 drop Both Eyes BID Darcy Duran PA-C      metFORMIN  500 mg Oral Daily With Breakfast Pachecoalecristian Smith MD      multivitamin stress formula  1 tablet Oral Daily Pachecoalecristian Smith MD      OLANZapine  10 mg Oral Q3H PRN Max 3/day Pachecoaled Ethan Smith MD      Or    OLANZapine  10 mg Intramuscular Q3H PRN Max 3/day Pachecoaled Ethan Smith MD      OLANZapine  5 mg Oral Q3H PRN Max 6/day Pachecoaled Ethan Smith MD      Or    OLANZapine  5 mg Intramuscular Q3H PRN Max 6/day Pachecoaled Ethan Smith MD      OLANZapine  2.5 mg Oral Q3H PRN Max 8/day Pachecoaled Ethan Smith MD      ondansetron  4 mg Oral Q6H PRN Teresa Ames PA-C      pantoprazole  40 mg Oral BID AC Pachecoaled Ethan Smith MD      polyethylene glycol  17 g Oral Daily PRN Pachecoaled Ethan Smith MD      propranolol  20 mg Oral Daily Darcy Duran PA-C      Semaglutide-Weight Management  1.7 mg Subcutaneous Weekly Darcy Duran PA-C      senna-docusate sodium  1 tablet Oral BID PRN Lizyd Ethan Smith MD      spironolactone  25 mg Oral Daily Pachecoaled Ethan Smith MD      SUMAtriptan  100 mg Oral Daily PRN Pachecoaled Ethan Smith MD      topiramate  150 mg Oral BID Pachecoaled Ethan Smith MD      traZODone  100 mg Oral HS PRN Pachecoalecristian Smith MD         Risks / Benefits of Treatment:     Risks, benefits, and possible side effects of medications explained to patient and patient verbalizes understanding and agreement for treatment.    Counseling /  Coordination of Care:     Patient's progress reviewed with nursing staff.  Medications, treatment progress and treatment plan reviewed with patient.  Supportive counseling provided to the patient.          LEIGHA Quiros

## 2024-04-13 NOTE — NURSING NOTE
Patient  visible  on milieu.Social with peers.Report mood feels good  today. Had  shower earlier this  morning.Goal for today is to stay engage with peers and staff.Pleasant and cooperative.Schedule PO medication administered as ordered. Mild anxiety and depression decline  PRN. Denies  hallucination, HI, SI. Appetite good. Attend group.Continue on safety check.

## 2024-04-13 NOTE — PLAN OF CARE
Problem: Ineffective Coping  Goal: Identifies ineffective coping skills  Outcome: Progressing  Goal: Identifies healthy coping skills  Outcome: Progressing  Goal: Participates in unit activities  Description: Interventions:  - Provide therapeutic environment   - Provide required programming   - Redirect inappropriate behaviors   Outcome: Progressing     Problem: Risk for Self Injury/Neglect  Goal: Verbalize thoughts and feelings  Description: Interventions:  - Assess and re-assess patient's lethality and potential for self-injury  - Engage patient in 1:1 interactions, daily, for a minimum of 15 minutes  - Encourage patient to express feelings, fears, frustrations, hopes  - Establish rapport/trust with patient   Outcome: Progressing  Goal: Refrain from harming self  Description: Interventions:  - Monitor patient closely, per order  - Develop a trusting relationship  - Supervise medication ingestion, monitor effects and side effects   Outcome: Progressing     Problem: Depression  Goal: Refrain from self-neglect  Outcome: Progressing     Problem: Ineffective Coping  Goal: Participates in unit activities  Description: Interventions:  - Provide therapeutic environment   - Provide required programming   - Redirect inappropriate behaviors   Outcome: Progressing

## 2024-04-14 PROCEDURE — 99232 SBSQ HOSP IP/OBS MODERATE 35: CPT | Performed by: PHYSICIAN ASSISTANT

## 2024-04-14 RX ADMIN — CYCLOSPORINE 1 DROP: 0 SOLUTION/ DROPS OPHTHALMIC; TOPICAL at 08:17

## 2024-04-14 RX ADMIN — Medication 5000 UNITS: at 08:12

## 2024-04-14 RX ADMIN — LEVOTHYROXINE SODIUM 112 MCG: 112 TABLET ORAL at 05:51

## 2024-04-14 RX ADMIN — METFORMIN HYDROCHLORIDE 500 MG: 500 TABLET ORAL at 08:12

## 2024-04-14 RX ADMIN — LOTEPREDNOL ETABONATE 1 DROP: 5 SUSPENSION/ DROPS OPHTHALMIC at 08:16

## 2024-04-14 RX ADMIN — B-COMPLEX W/ C & FOLIC ACID TAB 1 TABLET: TAB at 08:13

## 2024-04-14 RX ADMIN — PANTOPRAZOLE SODIUM 40 MG: 40 TABLET, DELAYED RELEASE ORAL at 08:12

## 2024-04-14 RX ADMIN — LOTEPREDNOL ETABONATE 1 DROP: 5 SUSPENSION/ DROPS OPHTHALMIC at 21:14

## 2024-04-14 RX ADMIN — LINACLOTIDE 1 CAPSULE: 290 CAPSULE, GELATIN COATED ORAL at 08:16

## 2024-04-14 RX ADMIN — ACETAZOLAMIDE 500 MG: 250 TABLET ORAL at 08:15

## 2024-04-14 RX ADMIN — TOPIRAMATE 150 MG: 25 TABLET, FILM COATED ORAL at 08:12

## 2024-04-14 RX ADMIN — TOPIRAMATE 150 MG: 25 TABLET, FILM COATED ORAL at 17:26

## 2024-04-14 RX ADMIN — LORATADINE 10 MG: 10 TABLET ORAL at 08:12

## 2024-04-14 RX ADMIN — LITHIUM CARBONATE 450 MG: 450 TABLET, EXTENDED RELEASE ORAL at 08:13

## 2024-04-14 RX ADMIN — GABAPENTIN 300 MG: 300 CAPSULE ORAL at 16:19

## 2024-04-14 RX ADMIN — CELECOXIB 200 MG: 100 CAPSULE ORAL at 17:27

## 2024-04-14 RX ADMIN — PROPRANOLOL HYDROCHLORIDE 20 MG: 20 TABLET ORAL at 21:11

## 2024-04-14 RX ADMIN — GABAPENTIN 300 MG: 300 CAPSULE ORAL at 08:13

## 2024-04-14 RX ADMIN — GABAPENTIN 900 MG: 600 TABLET, FILM COATED ORAL at 21:11

## 2024-04-14 RX ADMIN — SPIRONOLACTONE 25 MG: 25 TABLET ORAL at 08:12

## 2024-04-14 RX ADMIN — ACETAZOLAMIDE 500 MG: 250 TABLET ORAL at 17:26

## 2024-04-14 RX ADMIN — ESCITALOPRAM OXALATE 10 MG: 10 TABLET ORAL at 08:12

## 2024-04-14 RX ADMIN — LITHIUM CARBONATE 450 MG: 450 TABLET, EXTENDED RELEASE ORAL at 21:11

## 2024-04-14 RX ADMIN — CELECOXIB 200 MG: 100 CAPSULE ORAL at 08:12

## 2024-04-14 RX ADMIN — PANTOPRAZOLE SODIUM 40 MG: 40 TABLET, DELAYED RELEASE ORAL at 16:19

## 2024-04-14 RX ADMIN — CARIPRAZINE 6 MG: 3 CAPSULE, GELATIN COATED ORAL at 08:13

## 2024-04-14 NOTE — NURSING NOTE
BLEPS assessment has been completed  Bowel- Last BM 4/13/24  Lungs- Clear. WDL  Extremities- WDL  Pulses- Palpable  Skin- WDL

## 2024-04-14 NOTE — PLAN OF CARE
Problem: Risk for Self Injury/Neglect  Goal: Treatment Goal: Remain safe during length of stay, learn and adopt new coping skills, and be free of self-injurious ideation, impulses and acts at the time of discharge  Outcome: Progressing  Goal: Refrain from harming self  Description: Interventions:  - Monitor patient closely, per order  - Develop a trusting relationship  - Supervise medication ingestion, monitor effects and side effects   Outcome: Progressing     Problem: Depression  Goal: Refrain from isolation  Description: Interventions:  - Develop a trusting relationship   - Encourage socialization   Outcome: Progressing  Goal: Refrain from self-neglect  Outcome: Progressing     Problem: Anxiety  Goal: Anxiety is at manageable level  Description: Interventions:  - Assess and monitor patient's anxiety level.   - Monitor for signs and symptoms (heart palpitations, chest pain, shortness of breath, headaches, nausea, feeling jumpy, restlessness, irritable, apprehensive).   - Collaborate with interdisciplinary team and initiate plan and interventions as ordered.  - [place] patient to unit/surroundings  - Explain treatment plan  - Encourage participation in care  - Encourage verbalization of concerns/fears  - Identify coping mechanisms  - Assist in developing anxiety-reducing skills  - Administer/offer alternative therapies  - Limit or eliminate stimulants  Outcome: Progressing  See chart note

## 2024-04-14 NOTE — PROGRESS NOTES
"Progress Note - Gita Rendon 38 y.o. female MRN: [id number]    Unit/Bed#: [facility] 254-02 Encounter: [id number]        Subjective:   Patient seen and examined at bedside after reviewing the chart and discussing the case with the caring staff.      Patient examined at bedside.  Patient has no acute symptoms.     Physical Exam   Vitals: Blood pressure 115/73, pulse 65, temperature 98.4 °F (36.9 °C), temperature source Temporal, resp. rate 16, height 5' 7\" (1.702 m), weight (!) 150 kg (329 lb 9.6 oz), last menstrual period 03/11/2024, SpO2 98%.,Body mass index is 51.62 kg/m².  Constitutional: Patient in no acute distress.  HEENT: PERR, EOMI, MMM.  Cardiovascular: Normal rate and regular rhythm.    Pulmonary/Chest: Effort normal and breath sounds normal.   Abdomen: Soft, + BS, NT.    Assessment/Plan:  Gita Rendon is a(n) 37 y.o. female with MDD.     1.  Hypothyroidism. TSH low 0.153 4/5/24. T4 pending. Decrease levothyroxine to 112 mcg. Follow up with PCP and repeat TSH in 6 weeks.   2.  Constipation.  Continue Linzess 290 mcg daily (brought from home). Miralax and Senokot S as needed.  3.  Allergic rhinitis.  Claritin 10 mg daily.  4.  GERD.  Protonix 40 mg daily (omeprazole nonformulary).  5.  Chronic headaches.  Continue Topamax 150 mg twice daily, propranolol 20 mg twice daily, Imitrex 100 mg as needed.   6.  Chronic back pain.  Patient is on gabapentin 600 mg twice daily, Celebrex 200 mg twice daily.  Patient may use extra pillow for sleep.   7.  Dry eyes.  Continue cyclosporine and Lotemax eye drops (brought from home).  8.  Morbid obesity/prediabetes.  Continue metformin 500 mg daily.  Hgb a1c 5.3% on 4/5/2024.  Patient taking Wygovy every Wednesday non formulary.  9.  Hyperaldosteronism.  Patient on spironolactone 25 mg daily.   10. Intracranial HTN.  Patient on Diamox 250 mg daily.  11. Vitamin D deficiency.  Continue daily supplement.   12. Hypokalemia.  Patient's potassium 3.3 on 4/5. One time dose of " potassium chloride 40 mEq.  Repeat BMP normal, K 3.8 4/6/24.     The patient was discussed with Dr. Liao and he is in agreement with the above note.

## 2024-04-14 NOTE — NURSING NOTE
Patient has been visible in the milieu this morning. She is calm, pleasant. Engages in conversation with staff and peers. Bright. She has been compliant with her scheduled medications. Her appetite is good. She reports mild anxiety. Denies depression. Denies Si/Hi and hallucinations. Reports sleeping well. She is up and ambulatory without difficulty. She is able to make her needs known. Plan of care continues. Q7 minute safety checks in progress.

## 2024-04-14 NOTE — NURSING NOTE
Patient withdrawn to her room this evening. She brightens appropriately on approach. She reports having a difficult week due to physical ailments that affected her emotionally, states they often have positive correlation, although states today was a better day. She reports she find herself often alone to her thoughts, which worsen her anxiety but with distraction has felt more positive, she although finds comfort that her dog is being well taken care of. She reports her goal is to be discharged by the end of the week.. She denies SI/HI and hallucinations. Compliant with medication as ordered. Q7 minute checks ongoing.

## 2024-04-14 NOTE — NURSING NOTE
Pt is visible on the unit and social with peers. Pt is calm, pleasant, and cooperative. Pt denies SI/HI/AVH. Pt endorses mild anxiety and denies depression. Pt appears bright on approach. Pt has no complaints or concerns at this time.

## 2024-04-14 NOTE — PROGRESS NOTES
"Progress Note - Behavioral Health     Gita Rendon 38 y.o. female MRN: [id number]   Unit/Bed#: U 254-02 Encounter: [id number]  This note was not shared with the patient due to reasonable likelihood of causing patient harm    Behavior over the last 24 hours: some improvement.     Chart reviewed and discussed with nursing staff.  Elizabeth seen in office.  Is participating in grp this morning. States she is feeling better compared to end of last week- headaches lessening and she got a shower past 2 days. Denies SI.     Sleep:  adequate  Appetite:  good  Medication side effects: denies  ROS: denies vomiting, diarrhea, changes in gait., lessening of headaches, some leakage of urine at night- has been going on for \"long time\" and was undergoing pelvic florr PT 5-6 yrs ago -has not kept up with exercises.     Mental Status Evaluation:    Appearance:  age appropriate, casually dressed   Behavior:  pleasant, cooperative   Speech:  normal rate and volume   Mood:  improved   Affect:  A bit brighter   Thought Process:  goal directed   Associations: intact associations   Thought Content:  no overt delusions   Perceptual Disturbances: none   Risk Potential: Suicidal ideation - None at present  Homicidal ideation - None at present   Sensorium:  oriented to person, place, and time/date   Memory:  recent and remote memory grossly intact   Consciousness:  alert and awake   Attention: attention span and concentration are age appropriate   Insight:  fair   Judgment: fair   Gait/Station: normal gait/station   Motor Activity: no abnormal movements     Vital signs in last 24 hours:    Temp:  [98.4 °F (36.9 °C)-99.2 °F (37.3 °C)] 98.4 °F (36.9 °C)  HR:  [59-65] 65  Resp:  [16] 16  BP: (104-115)/(52-73) 115/73    Laboratory results: I have personally reviewed all pertinent laboratory/tests results.  4/9/24: lithium 0.55.  Lithium labs due 4/16/24      Assessment/Plan   Principal Problem:    Depression  Active Problems:    EILEEN (generalized " anxiety disorder)    PTSD (post-traumatic stress disorder)    Recommended Treatment: cont present tx    Planned medication and treatment changes:  No change: vraylar 6 mg/d, lexapro 10 mg/d, neurontin 300/300/900 mg, lithium cr 450 mg bid, inderal 20 mg/d, topamax 150 mg bid    All current active medications have been reviewed  Encourage group therapy, milieu therapy and occupational therapy  Behavioral Health checks every 7 minutes  Current Facility-Administered Medications   Medication Dose Route Frequency Provider Last Rate    acetaminophen  650 mg Oral Q6H PRN Pachecoaled Ethan Smith MD      acetaminophen  650 mg Oral Q4H PRN Pachecoalecristian Smith MD      acetaminophen  975 mg Oral Q6H PRN Pachecoalecristian Smith MD      acetaZOLAMIDE  500 mg Oral BID Pachecoalecristian Smith MD      aluminum-magnesium hydroxide-simethicone  30 mL Oral Q4H PRN Rogelio Smith MD      benztropine  1 mg Oral Q6H PRN Rogelio Smith MD      cariprazine  6 mg Oral Daily Rogelio Smith MD      celecoxib  200 mg Oral BID Rogelio Smith MD      Cholecalciferol  5,000 Units Oral Daily Rogelio Smith MD      cycloSPORINE (PF)  1 drop Ophthalmic BID Rogelio Smith MD      hydrOXYzine HCL  50 mg Oral Q6H PRN Max 4/day Rogelio Smith MD      Or    diphenhydrAMINE  50 mg Intramuscular Q6H PRN Rogelio Smith MD      escitalopram  10 mg Oral Daily LEIGHA Kaiser      gabapentin  300 mg Oral BID LEIGHA Kaiser      gabapentin  900 mg Oral HS Rogelio Smith MD      hydrOXYzine HCL  100 mg Oral Q6H PRN Max 4/day Rogelio Smith MD      Or    LORazepam  2 mg Intramuscular Q6H PRN Rogelio Smith MD      hydrOXYzine HCL  25 mg Oral Q6H PRN Max 4/day Rogelio Smith MD      levothyroxine  112 mcg Oral Early Morning Rogelio Mcneal  MD Luis      linaCLOtide  290 mcg Oral Daily Pachecoalecristian Smith MD      lithium carbonate  450 mg Oral Q12H LEIGHA Roth      loratadine  10 mg Oral Daily Pachecoaled Ethan Smith MD      loteprednol etabonate  1 drop Both Eyes BID Darcy Duran, PA-C      metFORMIN  500 mg Oral Daily With Breakfast Rogelio Smith MD      multivitamin stress formula  1 tablet Oral Daily Rogelio Smith MD      OLANZapine  10 mg Oral Q3H PRN Max 3/day Pachecoaled Ethan Simth MD      Or    OLANZapine  10 mg Intramuscular Q3H PRN Max 3/day Rogelio Smith MD      OLANZapine  5 mg Oral Q3H PRN Max 6/day Pachecoaled Ethan Smith MD      Or    OLANZapine  5 mg Intramuscular Q3H PRN Max 6/day Rogelio Smith MD      OLANZapine  2.5 mg Oral Q3H PRN Max 8/day Rogelio Smiht MD      ondansetron  4 mg Oral Q6H PRN Teresa Ames PA-C      pantoprazole  40 mg Oral BID AC Rogelio Smith MD      polyethylene glycol  17 g Oral Daily PRN Rogelio Smith MD      propranolol  20 mg Oral Daily Darcy Duran PA-C      Semaglutide-Weight Management  1.7 mg Subcutaneous Weekly Darcy Duran PA-C      senna-docusate sodium  1 tablet Oral BID PRN Rogelio Smith MD      spironolactone  25 mg Oral Daily Pachecoalecristian Smith MD      SUMAtriptan  100 mg Oral Daily PRN Rogelio Smith MD      topiramate  150 mg Oral BID Pachecoaled Ethan Smith MD      traZODone  100 mg Oral HS PRN Rogelio Smith MD         Risks / Benefits of Treatment:    Risks, benefits, and possible side effects of medications explained to patient and patient verbalizes understanding and agreement for treatment.    Counseling / Coordination of Care:    Total floor / unit time spent today 15 minutes. Greater than 50% of total time was spent with the patient and /  or family counseling and / or coordination of care. A description of counseling / coordination of care:  Patient's progress discussed with staff in treatment team meeting.  Medications, treatment progress and treatment plan reviewed with patient.    Sary Kenney PA-C 04/14/24

## 2024-04-15 ENCOUNTER — TELEPHONE (OUTPATIENT)
Dept: FAMILY MEDICINE CLINIC | Facility: CLINIC | Age: 39
End: 2024-04-15

## 2024-04-15 PROCEDURE — 99232 SBSQ HOSP IP/OBS MODERATE 35: CPT | Performed by: PSYCHIATRY & NEUROLOGY

## 2024-04-15 RX ORDER — LITHIUM CARBONATE 450 MG
450 TABLET, EXTENDED RELEASE ORAL 2 TIMES DAILY
Status: DISCONTINUED | OUTPATIENT
Start: 2024-04-15 | End: 2024-04-17 | Stop reason: HOSPADM

## 2024-04-15 RX ADMIN — ACETAZOLAMIDE 500 MG: 250 TABLET ORAL at 08:30

## 2024-04-15 RX ADMIN — LEVOTHYROXINE SODIUM 112 MCG: 112 TABLET ORAL at 05:39

## 2024-04-15 RX ADMIN — GABAPENTIN 300 MG: 300 CAPSULE ORAL at 08:32

## 2024-04-15 RX ADMIN — PANTOPRAZOLE SODIUM 40 MG: 40 TABLET, DELAYED RELEASE ORAL at 17:17

## 2024-04-15 RX ADMIN — CELECOXIB 200 MG: 100 CAPSULE ORAL at 17:17

## 2024-04-15 RX ADMIN — SPIRONOLACTONE 25 MG: 25 TABLET ORAL at 08:32

## 2024-04-15 RX ADMIN — Medication 5000 UNITS: at 08:31

## 2024-04-15 RX ADMIN — PANTOPRAZOLE SODIUM 40 MG: 40 TABLET, DELAYED RELEASE ORAL at 05:39

## 2024-04-15 RX ADMIN — LORATADINE 10 MG: 10 TABLET ORAL at 08:32

## 2024-04-15 RX ADMIN — PROPRANOLOL HYDROCHLORIDE 20 MG: 20 TABLET ORAL at 21:20

## 2024-04-15 RX ADMIN — LOTEPREDNOL ETABONATE 1 DROP: 5 SUSPENSION/ DROPS OPHTHALMIC at 08:30

## 2024-04-15 RX ADMIN — GABAPENTIN 300 MG: 300 CAPSULE ORAL at 17:17

## 2024-04-15 RX ADMIN — TOPIRAMATE 150 MG: 25 TABLET, FILM COATED ORAL at 08:31

## 2024-04-15 RX ADMIN — CYCLOSPORINE 1 DROP: 0 SOLUTION/ DROPS OPHTHALMIC; TOPICAL at 08:30

## 2024-04-15 RX ADMIN — B-COMPLEX W/ C & FOLIC ACID TAB 1 TABLET: TAB at 08:32

## 2024-04-15 RX ADMIN — GABAPENTIN 900 MG: 600 TABLET, FILM COATED ORAL at 21:20

## 2024-04-15 RX ADMIN — CARIPRAZINE 6 MG: 3 CAPSULE, GELATIN COATED ORAL at 08:31

## 2024-04-15 RX ADMIN — ESCITALOPRAM OXALATE 10 MG: 10 TABLET ORAL at 08:32

## 2024-04-15 RX ADMIN — LINACLOTIDE 1 CAPSULE: 290 CAPSULE, GELATIN COATED ORAL at 08:31

## 2024-04-15 RX ADMIN — LOTEPREDNOL ETABONATE 1 DROP: 5 SUSPENSION/ DROPS OPHTHALMIC at 21:22

## 2024-04-15 RX ADMIN — LITHIUM CARBONATE 450 MG: 450 TABLET, EXTENDED RELEASE ORAL at 17:17

## 2024-04-15 RX ADMIN — CELECOXIB 200 MG: 100 CAPSULE ORAL at 08:31

## 2024-04-15 RX ADMIN — LITHIUM CARBONATE 450 MG: 450 TABLET, EXTENDED RELEASE ORAL at 08:32

## 2024-04-15 RX ADMIN — TOPIRAMATE 150 MG: 25 TABLET, FILM COATED ORAL at 17:17

## 2024-04-15 RX ADMIN — METFORMIN HYDROCHLORIDE 500 MG: 500 TABLET ORAL at 08:32

## 2024-04-15 RX ADMIN — ACETAZOLAMIDE 500 MG: 250 TABLET ORAL at 17:17

## 2024-04-15 NOTE — PLAN OF CARE
Problem: Ineffective Coping  Goal: Participates in unit activities  Description: Interventions:  - Provide therapeutic environment   - Provide required programming   - Redirect inappropriate behaviors   Outcome: Progressing     Problem: Ineffective Coping  Goal: Participates in unit activities  Description: Interventions:  - Provide therapeutic environment   - Provide required programming   - Redirect inappropriate behaviors   Outcome: Progressing

## 2024-04-15 NOTE — NURSING NOTE
Patient was quiet and cooperative. Patient isolative and withdrawn. Staff support provided. Q 7 minute safety checks maintained. Patient denied SI/HI. Patient reported intermittent anxiety. Patient isolative and withdrawn at times. Patient compliant with medications and groups. Staff will continue to monitor and support.

## 2024-04-15 NOTE — PROGRESS NOTES
04/15/24    Team Meeting   Meeting Type Daily Rounds   Team Members Present   Team Members Present Physician;Nurse;   Physician Team Member Dr Pastor COOL; Pepe SORIA   Nursing Team Member Cassy CHRISTIAN   Social Work Team Member Cole TORRES   Patient/Family Present   Patient Present No   Patient's Family Present No     Bright, pleasant, more visible, mild anx-denies all other psych symptoms, slept, dc this week.

## 2024-04-15 NOTE — DISCHARGE INSTR - OTHER ORDERS
You are being discharged to your home at [address] [place] [address] [address] [facility] [address] TELEPHONE [phone] (O)     Triggers you have identified during your hospitalization that led to your admission (suicidal ideation, homicidal ideation, distressed mood, etc) ________. Coping skills you have identified during your hospitalization include ____________. If you are unable to deal with your distressed mood alone please contact (provider, family member, friend) __________. If that is not effective and you continue to have (ex: suicidal ideation, homicidal ideation, distressed mood, overwhelmed, in crisis) please contact (Crisis #) [facility] Crisis:   [phone], dial 911 or go to the nearest emergency center.      *[facility] Crisis:   [phone]   *National Suicide Prevention Lifeline:  1-312.239.5370  *Alcohol Anonymous: 298.493.7576  *[place] Drug & Alcohol Commission: (348) 930-6747   *National [place] on Mental Illness (INGE) HELPLINE: 731.152.2592/Website: www.inge.org  *Substance Abuse and Mental Health Services Administration([facility]) National Helpline, which is a confidential, free, 24-hour-a-day, 365-day-a-year, information service for individuals and family members facing mental health and/or substance use disorders. This service provides referrals to local treatment facilities, support groups, and community-based organizations. Callers can also order free publications and other information.  Call 1-824.961.7188/Website: www.[facility].gov  *[facility] [place] 2-1-1: This is a toll free, confidential, 24-hour-a-day service which connects you to a community  in your area who can help you find services and resources that are available to you locally and provide critical services that can improve and save lives.  Call: 211  /Website: http://www.[place]/       Reina, or Arlet, our Behavioral Health Nurse Navigators, will be calling you after your discharge, on the phone number that you  provided.  They will be available as an additional support, if needed.   If you wish to speak with one of them, you may contact Reina at [phone] or Arlet at [phone].          [facility]  [address] [place] [address]     Phone: [phone]  Fax: [phone]     Toll Free Phone Number  [phone]     Hours  Monday - Friday  8 a.m. - 4:30 p.m.

## 2024-04-15 NOTE — SOCIAL WORK
04/16/24    Team Meeting   Meeting Type Daily Rounds Meeting   Team Members Present   Team Members Present Physician;Nurse;;Occupational Therapist   Physician Team Member Pastor AHUJA   Nursing Team Member ANAHI Madera   Social Work Team Member ISA Kulkarni LSW        Patient/Family Present   Patient Present No   Patient's Family present No   Visible, social, stable, pleasant, cooperative, reviewed labs and meds, d/c home with psych/therapy, and ICM services tomorrow

## 2024-04-15 NOTE — PROGRESS NOTES
"Progress Note - Gita Rendon 38 y.o. female MRN: [id number]    Unit/Bed#: [facility] 254-02 Encounter: [id number]        Subjective:   Patient seen and examined at bedside after reviewing the chart and discussing the case with the caring staff.      Patient examined at bedside.  Patient has no acute symptoms.     Physical Exam   Vitals: Blood pressure 106/65, pulse 67, temperature 98.2 °F (36.8 °C), temperature source Temporal, resp. rate 18, height 5' 7\" (1.702 m), weight (!) 150 kg (329 lb 9.6 oz), last menstrual period 03/11/2024, SpO2 94%.,Body mass index is 51.62 kg/m².  Constitutional: Patient in no acute distress.  HEENT: PERR, EOMI, MMM.  Cardiovascular: Normal rate and regular rhythm.    Pulmonary/Chest: Effort normal and breath sounds normal.   Abdomen: Soft, + BS, NT.    Assessment/Plan:  Gita Rendon is a(n) 37 y.o. female with MDD.     1.  Hypothyroidism. TSH low 0.153 4/5/24. T4 pending. Decrease levothyroxine to 112 mcg. Follow up with PCP and repeat TSH in 6 weeks.   2.  Constipation.  Continue Linzess 290 mcg daily (brought from home). Miralax and Senokot S as needed.  3.  Allergic rhinitis.  Claritin 10 mg daily.  4.  GERD.  Protonix 40 mg daily (omeprazole nonformulary).  5.  Chronic headaches.  Continue Topamax 150 mg twice daily, propranolol 20 mg twice daily, Imitrex 100 mg as needed.   6.  Chronic back pain.  Patient is on gabapentin 600 mg twice daily, Celebrex 200 mg twice daily.  Patient may use extra pillow for sleep.   7.  Dry eyes.  Continue cyclosporine and Lotemax eye drops (brought from home).  8.  Morbid obesity/prediabetes.  Continue metformin 500 mg daily.  Hgb a1c 5.3% on 4/5/2024.  Patient taking Wygovy every Wednesday non formulary.  9.  Hyperaldosteronism.  Patient on spironolactone 25 mg daily.   10. Intracranial HTN.  Patient on Diamox 250 mg daily.  11. Vitamin D deficiency.  Continue daily supplement.   12. Hypokalemia.  Patient's potassium 3.3 on 4/5. One time dose of " potassium chloride 40 mEq.  Repeat BMP normal, K 3.8 4/6/24.     The patient was discussed with Dr. Liao and he is in agreement with the above note.

## 2024-04-15 NOTE — PLAN OF CARE
Problem: Risk for Self Injury/Neglect  Goal: Treatment Goal: Remain safe during length of stay, learn and adopt new coping skills, and be free of self-injurious ideation, impulses and acts at the time of discharge  Outcome: Progressing  Goal: Refrain from harming self  Description: Interventions:  - Monitor patient closely, per order  - Develop a trusting relationship  - Supervise medication ingestion, monitor effects and side effects   Outcome: Progressing     Problem: Depression  Goal: Treatment Goal: Demonstrate behavioral control of depressive symptoms, verbalize feelings of improved mood/affect, and adopt new coping skills prior to discharge  Outcome: Progressing   See chart note

## 2024-04-15 NOTE — TELEPHONE ENCOUNTER
Called patient to inform her that I rescheduled her appointment with us on 4/17 due to currently being in the hospital. Patient is rescheduled for 5/15 and if that day or time doesn't work to call us at [phone].

## 2024-04-16 ENCOUNTER — TELEPHONE (OUTPATIENT)
Dept: FAMILY MEDICINE CLINIC | Facility: CLINIC | Age: 39
End: 2024-04-16

## 2024-04-16 LAB
ANION GAP SERPL CALCULATED.3IONS-SCNC: 8 MMOL/L (ref 4–13)
BUN SERPL-MCNC: 20 MG/DL (ref 5–25)
CALCIUM SERPL-MCNC: 9.2 MG/DL (ref 8.4–10.2)
CHLORIDE SERPL-SCNC: 114 MMOL/L (ref 96–108)
CO2 SERPL-SCNC: 18 MMOL/L (ref 21–32)
CREAT SERPL-MCNC: 0.71 MG/DL (ref 0.6–1.3)
GFR SERPL CREATININE-BSD FRML MDRD: 107 ML/MIN/1.73SQ M
GLUCOSE P FAST SERPL-MCNC: 90 MG/DL (ref 65–99)
GLUCOSE SERPL-MCNC: 90 MG/DL (ref 65–140)
LITHIUM SERPL-SCNC: 0.7 MMOL/L (ref 0.6–1.2)
POTASSIUM SERPL-SCNC: 3.8 MMOL/L (ref 3.5–5.3)
SODIUM SERPL-SCNC: 140 MMOL/L (ref 135–147)

## 2024-04-16 PROCEDURE — 99232 SBSQ HOSP IP/OBS MODERATE 35: CPT | Performed by: PSYCHIATRY & NEUROLOGY

## 2024-04-16 PROCEDURE — 80048 BASIC METABOLIC PNL TOTAL CA: CPT | Performed by: NURSE PRACTITIONER

## 2024-04-16 PROCEDURE — 80178 ASSAY OF LITHIUM: CPT | Performed by: NURSE PRACTITIONER

## 2024-04-16 RX ORDER — ACETAZOLAMIDE 250 MG/1
500 TABLET ORAL 2 TIMES DAILY
Qty: 120 TABLET | Refills: 0 | Status: SHIPPED | OUTPATIENT
Start: 2024-04-16

## 2024-04-16 RX ORDER — GABAPENTIN 600 MG/1
900 TABLET ORAL
Qty: 45 TABLET | Refills: 0 | Status: SHIPPED | OUTPATIENT
Start: 2024-04-16

## 2024-04-16 RX ORDER — PROPRANOLOL HYDROCHLORIDE 20 MG/1
20 TABLET ORAL EVERY 12 HOURS SCHEDULED
Qty: 60 TABLET | Refills: 0 | Status: SHIPPED | OUTPATIENT
Start: 2024-04-16

## 2024-04-16 RX ORDER — CELECOXIB 200 MG/1
200 CAPSULE ORAL 2 TIMES DAILY
Qty: 60 CAPSULE | Refills: 0 | Status: SHIPPED | OUTPATIENT
Start: 2024-04-16

## 2024-04-16 RX ORDER — ESCITALOPRAM OXALATE 10 MG/1
10 TABLET ORAL DAILY
Qty: 30 TABLET | Refills: 0 | Status: SHIPPED | OUTPATIENT
Start: 2024-04-17 | End: 2024-05-17

## 2024-04-16 RX ORDER — SPIRONOLACTONE 25 MG/1
25 TABLET ORAL DAILY
Qty: 30 TABLET | Refills: 0 | Status: SHIPPED | OUTPATIENT
Start: 2024-04-16

## 2024-04-16 RX ORDER — GABAPENTIN 300 MG/1
300 CAPSULE ORAL 2 TIMES DAILY
Qty: 60 CAPSULE | Refills: 0 | Status: SHIPPED | OUTPATIENT
Start: 2024-04-16

## 2024-04-16 RX ORDER — LITHIUM CARBONATE 450 MG
450 TABLET, EXTENDED RELEASE ORAL 2 TIMES DAILY
Qty: 28 TABLET | Refills: 1 | Status: SHIPPED | OUTPATIENT
Start: 2024-04-16 | End: 2024-05-14

## 2024-04-16 RX ORDER — OMEPRAZOLE 20 MG/1
20 CAPSULE, DELAYED RELEASE ORAL 2 TIMES DAILY
Qty: 60 CAPSULE | Refills: 0 | Status: SHIPPED | OUTPATIENT
Start: 2024-04-16

## 2024-04-16 RX ORDER — TOPIRAMATE 50 MG/1
150 TABLET, FILM COATED ORAL 2 TIMES DAILY
Qty: 180 TABLET | Refills: 0 | Status: SHIPPED | OUTPATIENT
Start: 2024-04-16

## 2024-04-16 RX ORDER — LEVOTHYROXINE SODIUM 112 UG/1
112 TABLET ORAL
Qty: 30 TABLET | Refills: 0 | Status: SHIPPED | OUTPATIENT
Start: 2024-04-17

## 2024-04-16 RX ORDER — LORATADINE 10 MG/1
10 TABLET ORAL DAILY
Qty: 30 TABLET | Refills: 0 | Status: SHIPPED | OUTPATIENT
Start: 2024-04-16

## 2024-04-16 RX ADMIN — ESCITALOPRAM OXALATE 10 MG: 10 TABLET ORAL at 08:27

## 2024-04-16 RX ADMIN — B-COMPLEX W/ C & FOLIC ACID TAB 1 TABLET: TAB at 08:27

## 2024-04-16 RX ADMIN — CELECOXIB 200 MG: 100 CAPSULE ORAL at 08:27

## 2024-04-16 RX ADMIN — CELECOXIB 200 MG: 100 CAPSULE ORAL at 17:22

## 2024-04-16 RX ADMIN — ACETAZOLAMIDE 500 MG: 250 TABLET ORAL at 17:22

## 2024-04-16 RX ADMIN — SPIRONOLACTONE 25 MG: 25 TABLET ORAL at 08:27

## 2024-04-16 RX ADMIN — PANTOPRAZOLE SODIUM 40 MG: 40 TABLET, DELAYED RELEASE ORAL at 08:27

## 2024-04-16 RX ADMIN — METFORMIN HYDROCHLORIDE 500 MG: 500 TABLET ORAL at 08:28

## 2024-04-16 RX ADMIN — LITHIUM CARBONATE 450 MG: 450 TABLET, EXTENDED RELEASE ORAL at 17:22

## 2024-04-16 RX ADMIN — GABAPENTIN 300 MG: 300 CAPSULE ORAL at 17:23

## 2024-04-16 RX ADMIN — LITHIUM CARBONATE 450 MG: 450 TABLET, EXTENDED RELEASE ORAL at 08:27

## 2024-04-16 RX ADMIN — LORATADINE 10 MG: 10 TABLET ORAL at 08:27

## 2024-04-16 RX ADMIN — PANTOPRAZOLE SODIUM 40 MG: 40 TABLET, DELAYED RELEASE ORAL at 17:23

## 2024-04-16 RX ADMIN — TOPIRAMATE 150 MG: 25 TABLET, FILM COATED ORAL at 17:22

## 2024-04-16 RX ADMIN — Medication 5000 UNITS: at 08:27

## 2024-04-16 RX ADMIN — ACETAZOLAMIDE 500 MG: 250 TABLET ORAL at 08:25

## 2024-04-16 RX ADMIN — GABAPENTIN 300 MG: 300 CAPSULE ORAL at 08:27

## 2024-04-16 RX ADMIN — CYCLOSPORINE 1 DROP: 0 SOLUTION/ DROPS OPHTHALMIC; TOPICAL at 08:25

## 2024-04-16 RX ADMIN — GABAPENTIN 900 MG: 600 TABLET, FILM COATED ORAL at 20:49

## 2024-04-16 RX ADMIN — CARIPRAZINE 6 MG: 3 CAPSULE, GELATIN COATED ORAL at 08:27

## 2024-04-16 RX ADMIN — LOTEPREDNOL ETABONATE 1 DROP: 5 SUSPENSION/ DROPS OPHTHALMIC at 08:25

## 2024-04-16 RX ADMIN — LINACLOTIDE 1 CAPSULE: 290 CAPSULE, GELATIN COATED ORAL at 08:25

## 2024-04-16 RX ADMIN — TOPIRAMATE 150 MG: 25 TABLET, FILM COATED ORAL at 08:27

## 2024-04-16 RX ADMIN — LEVOTHYROXINE SODIUM 112 MCG: 112 TABLET ORAL at 05:30

## 2024-04-16 RX ADMIN — PROPRANOLOL HYDROCHLORIDE 20 MG: 20 TABLET ORAL at 20:49

## 2024-04-16 NOTE — NURSING NOTE
Patient withdrawn to room Bed early. Attended snack.Pleasant and cooperative. Denies any SI/HI,AV/H. anxiety and depression. Medication compliant. Q 7 continuous monitoring maintained.

## 2024-04-16 NOTE — PROGRESS NOTES
04/16/24 1419   Activity/Group Checklist   Group Admission/Discharge  (relapse prevention plan)   Attendance Attended   Attendance Duration (min) 0-15   Interactions Interacted appropriately   Affect/Mood Appropriate   Goals Achieved Identified feelings;Identified triggers;Identified relapse prevention strategies;Discussed coping strategies;Discussed discharge plans;Identified resources and support systems;Able to engage in interactions;Able to listen to others;Able to reflect/comment on own behavior;Able to manage/cope with feelings;Able to self-disclose;Able to recieve feedback;Able to experience relief/decrease in symptoms     Patient agreeable to meet and complete relapse prevention plan with [facility].  Patient information from form can be found in media.

## 2024-04-16 NOTE — NURSING NOTE
Patient was pleasant and cooperative. Patient social with staff and peers. Staff support provided. Q 7 minute safety checks maintained. Patient denied SI/HI. Patient compliant with medications and groups. Patient reported feeling better and ready for discharge. Staff will continue to monitor and support.

## 2024-04-16 NOTE — PROGRESS NOTES
"  Progress Note - Behavioral Health   Gita Rendon 38 y.o. female MRN: @MRN   Unit/Bed#: U 254-02 Encounter: [id number]      Report from staff regarding this patient received and discussed, and records reviewed prior to seeing this patient  Behavior over the last 24 hours: improving.  The patient reported she is does not feel significantly depressed she feels improving as a result of treatment.    Patient remains anxious, appropriate, and better today.    Sleep: improved  Appetite: fair  Medication side effects: No       Mental Status Evaluation:    Appearance:  casually dressed   Mood:  improved, depressed, anxious   Affect: brighter, constricted    Speech:  fluent   Thought Content:  normal, negative thinking   Perceptual Disturbances: no auditory hallucinations, no visual hallucinations, denies auditory hallucinations when asked, does not appear responding to internal stimuli   Risk Potential: Suicidal ideation - None, contracts for safety on the unit   Insight:  limited   Motor Activity: no abnormal movements       Laboratory results:  I have personally reviewed all pertinent laboratory results.    Progress Toward Goals: improving    Assessment/Plan   Principal Problem:    Depression  Active Problems:    EILEEN (generalized anxiety disorder)    PTSD (post-traumatic stress disorder)    Recommended Treatment: We will not make medication adjustment because the patient already improving.  Supportive therapy was provided.  Patient was receptive.    Planned medication and treatment changes:    All current active medications have been reviewed.  Continue treatment with group therapy, milieu therapy, occupational therapy and medication management.      ** Please Note: This note has been constructed using a voice recognition system. **      BMP: No results for input(s): \"NA\", \"K\", \"CL\", \"CO2\", \"BUN\" in the last 72 hours.    Invalid input(s): \"CREA\", \"GLU\"  Vitals:    04/15/24 1935   BP: 111/62   Pulse: 82   Resp: 18 "   Temp: 99 °F (37.2 °C)   SpO2: 96%        Medication Administration - last 24 hours from 04/14/2024 2242 to 04/15/2024 2242         Date/Time Order Dose Route Action Action by     04/15/2024 1717 EDT acetaZOLAMIDE (DIAMOX) tablet 500 mg 500 mg Oral Given Kimberly Guerrier RN     04/15/2024 0830 EDT acetaZOLAMIDE (DIAMOX) tablet 500 mg 500 mg Oral Given Kimberly Guerrier RN     04/15/2024 1717 EDT celecoxib (CeleBREX) capsule 200 mg 200 mg Oral Given Kimberly Guerrier RN     04/15/2024 0831 EDT celecoxib (CeleBREX) capsule 200 mg 200 mg Oral Given Kimberly Guerrier RN     04/15/2024 0830 EDT cycloSPORINE (PF) 0.09 % SOLN 1 drop 1 drop Ophthalmic Given Kimberly Guerrier RN     04/15/2024 0831 EDT Cholecalciferol (VITAMIN D3) tablet 5,000 Units 5,000 Units Oral Given Kimberly Guerrier RN     04/15/2024 0831 EDT linaCLOtide CAPS 1 capsule 1 capsule Oral Given Kimberly Guerrier RN     04/15/2024 0832 EDT loratadine (CLARITIN) tablet 10 mg 10 mg Oral Given Kimberly Guerrier RN     04/15/2024 0832 EDT metFORMIN (GLUCOPHAGE) tablet 500 mg 500 mg Oral Given Kimberly Guerrier RN     04/15/2024 0832 EDT multivitamin stress formula tablet 1 tablet 1 tablet Oral Given Kimberly Guerrier RN     04/15/2024 1717 EDT pantoprazole (PROTONIX) EC tablet 40 mg 40 mg Oral Given Kimberly Guerrier RN     04/15/2024 0700 EDT pantoprazole (PROTONIX) EC tablet 40 mg -- Oral Canceled Entry Haley Gatica LPN     04/15/2024 0539 EDT pantoprazole (PROTONIX) EC tablet 40 mg 40 mg Oral Given Haley Gatica LPN     04/15/2024 1717 EDT topiramate (TOPAMAX) tablet 150 mg 150 mg Oral Given Kimberly Guerrier RN     04/15/2024 0831 EDT topiramate (TOPAMAX) tablet 150 mg 150 mg Oral Given Kimberly Guerrier RN     04/15/2024 0832 EDT spironolactone (ALDACTONE) tablet 25 mg 25 mg Oral Given Kimberly Guerrier RN     04/15/2024 0539 EDT levothyroxine tablet 112 mcg 112 mcg Oral Given Haley Gatica LPN     04/15/2024 0831 EDT cariprazine (VRAYLAR) capsule 6 mg 6 mg Oral Given Kimberly Guerrier RN     04/15/2024 2120 EDT gabapentin  (NEURONTIN) tablet 900 mg 900 mg Oral Given Haley Gatica LPN     04/15/2024 2120 EDT propranolol (INDERAL) tablet 20 mg 20 mg Oral Given Haley Gatica LPN     04/15/2024 1717 EDT gabapentin (NEURONTIN) capsule 300 mg 300 mg Oral Given Kimberly Guerrier RN     04/15/2024 0832 EDT gabapentin (NEURONTIN) capsule 300 mg 300 mg Oral Given Kimberly Guerrier RN     04/15/2024 2122 EDT loteprednol etabonate (LOTEMAX) 0.5 % ophthalmic suspension 1 drop 1 drop Both Eyes Given Haley Gatica LPN     04/15/2024 0830 EDT loteprednol etabonate (LOTEMAX) 0.5 % ophthalmic suspension 1 drop 1 drop Both Eyes Given Kimberly Guerrier RN     04/15/2024 0832 EDT escitalopram (LEXAPRO) tablet 10 mg 10 mg Oral Given Kimberly Guerrier RN     04/15/2024 0832 EDT lithium carbonate (LITHOBID) CR tablet 450 mg 450 mg Oral Given Kimberly Guerrier RN     04/15/2024 1717 EDT lithium carbonate (LITHOBID) CR tablet 450 mg 450 mg Oral Given Kimberly Guerrier RN

## 2024-04-16 NOTE — DISCHARGE INSTR - AVS FIRST PAGE
Last lithium level therapeutic at 0.7; drawn 4/16/2024.  Please have your outpatient psychiatrist continue to monitor lithium level in combination with Celebrex therapy.    A 30-day supply of psychotropic medications was submitted to [facility], [place] with the exception of lithium which was submitted for a 14-day supply and 1 refill.

## 2024-04-16 NOTE — PLAN OF CARE
Problem: Risk for Self Injury/Neglect  Goal: Complete daily ADLs, including personal hygiene independently, as able  Description: Interventions:  - Observe, teach, and assist patient with ADLS  - Monitor and promote a balance of rest/activity, with adequate nutrition and elimination  Outcome: Progressing   See chart note.

## 2024-04-16 NOTE — SOCIAL WORK
CM placed call to pt roommate Svetlana [phone]. CM reviewed d/c plan and supports. Svetlana verbalized understanding and in agreement.  Svetlana stated that she will p/u pt at 10:30am tomorrow.      CM placed call to pt [facility] Claire hSell [phone] through [facility]. CM directed to [facility] notifying of pt d/c.      CM placed call to pt psych and therapist at [facility] [phone]. CM notified of pt d/c. CM secured post d/c with [facility] on 4/23 ar 12pm for talk therapy and with Dr. Weston on 5/1 at 12:15pm Both appts are in-person.

## 2024-04-16 NOTE — SOCIAL WORK
IMM explained to pt who expressed verbal confirmation of understanding. Pt does not wish to appeal d/c scheduled for tomorrow. CM reviewed d/c plan and supports. Pt in agreement and verbalized understanding. Signed IMM in scan bin to be entered into pt's EHR, copy provided to pt.

## 2024-04-16 NOTE — PROGRESS NOTES
"Progress Note - Gita Rendon 38 y.o. female MRN: [id number]    Unit/Bed#: [facility] 254-02 Encounter: [id number]        Subjective:   Patient seen and examined at bedside after reviewing the chart and discussing the case with the caring staff.      Patient examined at bedside.  Patient has no acute symptoms.     Patient is being discharged tomorrow, 4/17/24.    Physical Exam   Vitals: Blood pressure 121/66, pulse 60, temperature 97.9 °F (36.6 °C), temperature source Temporal, resp. rate 17, height 5' 7\" (1.702 m), weight (!) 150 kg (329 lb 9.6 oz), last menstrual period 03/11/2024, SpO2 96%.,Body mass index is 51.62 kg/m².  Constitutional: Patient in no acute distress.  HEENT: PERR, EOMI, MMM.  Cardiovascular: Normal rate and regular rhythm.    Pulmonary/Chest: Effort normal and breath sounds normal.   Abdomen: Soft, + BS, NT.    Assessment/Plan:  Gita Rendon is a(n) 37 y.o. female with MDD.    Medical Clearance: Patient is medically cleared for discharge. All prescriptions have been sent to pharmacy.      1.  Hypothyroidism. TSH low 0.153 4/5/24. T4 pending. Decrease levothyroxine to 112 mcg. Follow up with PCP and repeat TSH in 6 weeks.   2.  Constipation.  Continue Linzess 290 mcg daily (brought from home). Miralax and Senokot S as needed.  3.  Allergic rhinitis.  Claritin 10 mg daily.  4.  GERD.  Protonix 40 mg daily (omeprazole nonformulary).  5.  Chronic headaches.  Continue Topamax 150 mg twice daily, propranolol 20 mg twice daily, Imitrex 100 mg as needed.   6.  Chronic back pain.  Patient is on gabapentin 600 mg twice daily, Celebrex 200 mg twice daily.  Patient may use extra pillow for sleep.   7.  Dry eyes.  Continue cyclosporine and Lotemax eye drops (brought from home).  8.  Morbid obesity/prediabetes.  Continue metformin 500 mg daily.  Hgb a1c 5.3% on 4/5/2024.  Patient taking Wygovy every Wednesday non formulary.  9.  Hyperaldosteronism.  Patient on spironolactone 25 mg daily.   10. Intracranial " HTN.  Patient on Diamox 250 mg daily.  11. Vitamin D deficiency.  Continue daily supplement.   12. Hypokalemia.  Patient's potassium 3.3 on 4/5. One time dose of potassium chloride 40 mEq.  Repeat BMP normal, K 3.8 4/6/24.     The patient was discussed with Dr. Liao and he is in agreement with the above note.

## 2024-04-16 NOTE — PROGRESS NOTES
"Progress Note - Behavioral Health   Gita Rendon 38 y.o. female MRN: [id number]  Unit/Bed#: [facility] 254-02 Encounter: [id number]    Assessment/Plan   Principal Problem:    Depression  Active Problems:    EILEEN (generalized anxiety disorder)    PTSD (post-traumatic stress disorder)      Behavior over the last 24 hours:  improved  Sleep: normal  Appetite: normal  Medication side effects: No  ROS:  Mild headache and all other systems are negative    Elizabeth has been less withdrawn and more visible in the milieu.  Hygiene improving.  Presents as bright, calm, and cooperative.  Denies further depression and reports significant improvement with anxiety rating 4/10.  Identifies psychosocial stressors as reason for anxiety.  No longer endorsing hopelessness and verbalizes goal directed and forward thinking.  Denies SI and has been maintaining safety on unit.  Identifies adequate safety plan and protective factors against suicide.    Mental Status Evaluation:  Appearance:  overweight and improved hygiene, shoulderlength hair, wearing glasses   Behavior:  Cooperative, calm, good eye contact   Speech:  normal pitch and normal volume   Mood:  \"Good\", less anxious   Affect:  mood-congruent   Thought Process:  goal directed and linear, forward thinking   Associations: intact associations   Thought Content:  No overt delusions or paranoia verbalized, denies ruminations, negative thinking resolved   Perceptual Disturbances: Denies AVH, did not appear internally preoccupied   Risk Potential: Suicidal Ideations none  Homicidal Ideations none  Potential for Aggression No   Sensorium:  person, place, time/date, and situation   Memory:  recent and remote memory grossly intact   Consciousness:  alert and awake    Attention: attention span and concentration were age appropriate   Insight:  Limited, but improved   Judgment: Limited, but improved   Gait/Station: normal gait/station and normal balance   Motor Activity: no abnormal movements "     Progress Toward Goals: Improving.  Denies depression and reports improving anxiety; affect mood congruent.  Has been maintaining safety with no return of SI.  Linear and forward thinking.  Lithium level obtained today and therapeutic at 0.7.  Plan is to continue with current psychotropic regimen and discharge home tomorrow, 4/17/2024.    Recommended Treatment: Continue with group therapy, milieu therapy and occupational therapy.      Risks, benefits and possible side effects of Medications:   Risks, benefits, and possible side effects of medications explained to patient and patient verbalizes understanding.      Medications: all current active meds have been reviewed, continue current psychiatric medications, and current meds:   Current Facility-Administered Medications   Medication Dose Route Frequency    acetaminophen (TYLENOL) tablet 650 mg  650 mg Oral Q6H PRN    acetaminophen (TYLENOL) tablet 650 mg  650 mg Oral Q4H PRN    acetaminophen (TYLENOL) tablet 975 mg  975 mg Oral Q6H PRN    acetaZOLAMIDE (DIAMOX) tablet 500 mg  500 mg Oral BID    aluminum-magnesium hydroxide-simethicone (MAALOX) oral suspension 30 mL  30 mL Oral Q4H PRN    benztropine (COGENTIN) tablet 1 mg  1 mg Oral Q6H PRN    cariprazine (VRAYLAR) capsule 6 mg  6 mg Oral Daily    celecoxib (CeleBREX) capsule 200 mg  200 mg Oral BID    Cholecalciferol (VITAMIN D3) tablet 5,000 Units  5,000 Units Oral Daily    cycloSPORINE (PF) 0.09 % SOLN 1 drop  1 drop Ophthalmic BID    hydrOXYzine HCL (ATARAX) tablet 50 mg  50 mg Oral Q6H PRN Max 4/day    Or    diphenhydrAMINE (BENADRYL) injection 50 mg  50 mg Intramuscular Q6H PRN    escitalopram (LEXAPRO) tablet 10 mg  10 mg Oral Daily    gabapentin (NEURONTIN) capsule 300 mg  300 mg Oral BID    gabapentin (NEURONTIN) tablet 900 mg  900 mg Oral HS    hydrOXYzine HCL (ATARAX) tablet 100 mg  100 mg Oral Q6H PRN Max 4/day    Or    LORazepam (ATIVAN) injection 2 mg  2 mg Intramuscular Q6H PRN    hydrOXYzine HCL  (ATARAX) tablet 25 mg  25 mg Oral Q6H PRN Max 4/day    levothyroxine tablet 112 mcg  112 mcg Oral Early Morning    linaCLOtide CAPS 1 capsule  290 mcg Oral Daily    lithium carbonate (LITHOBID) CR tablet 450 mg  450 mg Oral BID    loratadine (CLARITIN) tablet 10 mg  10 mg Oral Daily    loteprednol etabonate (LOTEMAX) 0.5 % ophthalmic suspension 1 drop  1 drop Both Eyes BID    metFORMIN (GLUCOPHAGE) tablet 500 mg  500 mg Oral Daily With Breakfast    multivitamin stress formula tablet 1 tablet  1 tablet Oral Daily    OLANZapine (ZyPREXA) tablet 10 mg  10 mg Oral Q3H PRN Max 3/day    Or    OLANZapine (ZyPREXA) IM injection 10 mg  10 mg Intramuscular Q3H PRN Max 3/day    OLANZapine (ZyPREXA) tablet 5 mg  5 mg Oral Q3H PRN Max 6/day    Or    OLANZapine (ZyPREXA) IM injection 5 mg  5 mg Intramuscular Q3H PRN Max 6/day    OLANZapine (ZyPREXA) tablet 2.5 mg  2.5 mg Oral Q3H PRN Max 8/day    ondansetron (ZOFRAN-ODT) dispersible tablet 4 mg  4 mg Oral Q6H PRN    pantoprazole (PROTONIX) EC tablet 40 mg  40 mg Oral BID AC    polyethylene glycol (MIRALAX) packet 17 g  17 g Oral Daily PRN    propranolol (INDERAL) tablet 20 mg  20 mg Oral Daily    Semaglutide-Weight Management (WEGOVY) subcutaneous 1.7 mg  1.7 mg Subcutaneous Weekly    senna-docusate sodium (SENOKOT S) 8.6-50 mg per tablet 1 tablet  1 tablet Oral BID PRN    spironolactone (ALDACTONE) tablet 25 mg  25 mg Oral Daily    SUMAtriptan (IMITREX) tablet 100 mg  100 mg Oral Daily PRN    topiramate (TOPAMAX) tablet 150 mg  150 mg Oral BID    traZODone (DESYREL) tablet 100 mg  100 mg Oral HS PRN   .    Labs: I have personally reviewed all pertinent laboratory/tests results. CBC:   Lab Results   Component Value Date    WBC 9.55 04/05/2024    RBC 4.69 04/05/2024    HGB 13.4 04/05/2024    HCT 42.5 04/05/2024    MCV 91 04/05/2024     04/05/2024    MCH 28.6 04/05/2024    MCHC 31.5 04/05/2024    RDW 13.4 04/05/2024    MPV 10.9 04/05/2024    NEUTROABS 5.28 04/05/2024      CMP:   Lab Results   Component Value Date    SODIUM 140 04/16/2024    K 3.8 04/16/2024     (H) 04/16/2024    CO2 18 (L) 04/16/2024    AGAP 8 04/16/2024    BUN 20 04/16/2024    CREATININE 0.71 04/16/2024    GLUC 90 04/16/2024    GLUF 90 04/16/2024    CALCIUM 9.2 04/16/2024    AST 17 04/05/2024    ALT 16 04/05/2024    ALKPHOS 55 04/05/2024    TP 6.0 (L) 04/05/2024    ALB 3.6 04/05/2024    TBILI 0.31 04/05/2024    EGFR 107 04/16/2024     Lithium:   Lab Results   Component Value Date    LITHIUM 0.70 04/16/2024     EKG   Lab Results   Component Value Date    VENTRATE 54 04/05/2024    ATRIALRATE 54 04/05/2024    PRINT 176 04/05/2024    QRSDINT 94 04/05/2024    QTINT 416 04/05/2024    QTCINT 394 04/05/2024    PAXIS 45 04/05/2024    QRSAXIS 6 04/05/2024    TWAVEAXIS 18 04/05/2024       Counseling / Coordination of Care  Total floor / unit time spent today 30 minutes. Greater than 50% of total time was spent with the patient and / or family counseling and / or coordination of care. A description of the counseling / coordination of care: Medication education, treatment plan, safety/discharge planning

## 2024-04-16 NOTE — TELEPHONE ENCOUNTER
Patient called into the office and said she has to cancel her appointment for tomorrow .  I stated we will and to call us when she gets discharged and we can schedule patient right away for a TCM.  Patient was aware.

## 2024-04-17 VITALS
BODY MASS INDEX: 45.99 KG/M2 | TEMPERATURE: 97.5 F | RESPIRATION RATE: 18 BRPM | HEART RATE: 64 BPM | HEIGHT: 67 IN | SYSTOLIC BLOOD PRESSURE: 113 MMHG | WEIGHT: 293 LBS | DIASTOLIC BLOOD PRESSURE: 74 MMHG | OXYGEN SATURATION: 98 %

## 2024-04-17 DIAGNOSIS — E66.01 CLASS 3 SEVERE OBESITY WITH BODY MASS INDEX (BMI) OF 60.0 TO 69.9 IN ADULT (HCC): ICD-10-CM

## 2024-04-17 DIAGNOSIS — G25.81 RESTLESS LEG SYNDROME: ICD-10-CM

## 2024-04-17 PROBLEM — F32.A DEPRESSION: Status: RESOLVED | Noted: 2023-08-07 | Resolved: 2024-04-17

## 2024-04-17 PROCEDURE — 99238 HOSP IP/OBS DSCHRG MGMT 30/<: CPT | Performed by: NURSE PRACTITIONER

## 2024-04-17 RX ADMIN — GABAPENTIN 300 MG: 300 CAPSULE ORAL at 08:41

## 2024-04-17 RX ADMIN — LEVOTHYROXINE SODIUM 112 MCG: 112 TABLET ORAL at 06:15

## 2024-04-17 RX ADMIN — SPIRONOLACTONE 25 MG: 25 TABLET ORAL at 08:37

## 2024-04-17 RX ADMIN — METFORMIN HYDROCHLORIDE 500 MG: 500 TABLET ORAL at 08:37

## 2024-04-17 RX ADMIN — LORATADINE 10 MG: 10 TABLET ORAL at 08:38

## 2024-04-17 RX ADMIN — LITHIUM CARBONATE 450 MG: 450 TABLET, EXTENDED RELEASE ORAL at 08:37

## 2024-04-17 RX ADMIN — CYCLOSPORINE 1 DROP: 0 SOLUTION/ DROPS OPHTHALMIC; TOPICAL at 08:58

## 2024-04-17 RX ADMIN — PANTOPRAZOLE SODIUM 40 MG: 40 TABLET, DELAYED RELEASE ORAL at 06:15

## 2024-04-17 RX ADMIN — ACETAZOLAMIDE 500 MG: 250 TABLET ORAL at 08:37

## 2024-04-17 RX ADMIN — B-COMPLEX W/ C & FOLIC ACID TAB 1 TABLET: TAB at 08:38

## 2024-04-17 RX ADMIN — Medication 5000 UNITS: at 08:37

## 2024-04-17 RX ADMIN — CARIPRAZINE 6 MG: 3 CAPSULE, GELATIN COATED ORAL at 08:38

## 2024-04-17 RX ADMIN — LINACLOTIDE 1 CAPSULE: 290 CAPSULE, GELATIN COATED ORAL at 08:58

## 2024-04-17 RX ADMIN — ESCITALOPRAM OXALATE 10 MG: 10 TABLET ORAL at 08:38

## 2024-04-17 RX ADMIN — SEMAGLUTIDE 1.7 MG: 1.7 INJECTION, SOLUTION SUBCUTANEOUS at 09:00

## 2024-04-17 RX ADMIN — TOPIRAMATE 150 MG: 25 TABLET, FILM COATED ORAL at 08:37

## 2024-04-17 RX ADMIN — LOTEPREDNOL ETABONATE 1 DROP: 5 SUSPENSION/ DROPS OPHTHALMIC at 08:58

## 2024-04-17 RX ADMIN — CELECOXIB 200 MG: 100 CAPSULE ORAL at 08:37

## 2024-04-17 NOTE — PROGRESS NOTES
"Progress Note - Gita Rendon 38 y.o. female MRN: [id number]    Unit/Bed#: [facility] 254-02 Encounter: [id number]        Subjective:   Patient seen and examined at bedside after reviewing the chart and discussing the case with the caring staff.      Patient examined at bedside.  Patient has no acute symptoms.     Patient is being discharged today, 4/17/24.    Physical Exam   Vitals: Blood pressure 113/74, pulse 64, temperature 97.5 °F (36.4 °C), temperature source Temporal, resp. rate 18, height 5' 7\" (1.702 m), weight (!) 150 kg (329 lb 9.6 oz), last menstrual period 03/11/2024, SpO2 98%.,Body mass index is 51.62 kg/m².  Constitutional: Patient in no acute distress.  HEENT: PERR, EOMI, MMM.  Cardiovascular: Normal rate and regular rhythm.    Pulmonary/Chest: Effort normal and breath sounds normal.   Abdomen: Soft, + BS, NT.    Assessment/Plan:  Gita Rendon is a(n) 37 y.o. female with MDD.    Medical Clearance: Patient is medically cleared for discharge. All prescriptions have been sent to pharmacy.      1.  Hypothyroidism. TSH low 0.153 4/5/24. T4 pending. Decrease levothyroxine to 112 mcg. Follow up with PCP and repeat TSH in 6 weeks.   2.  Constipation.  Continue Linzess 290 mcg daily (brought from home). Miralax and Senokot S as needed.  3.  Allergic rhinitis.  Claritin 10 mg daily.  4.  GERD.  Protonix 40 mg daily (omeprazole nonformulary).  5.  Chronic headaches.  Continue Topamax 150 mg twice daily, propranolol 20 mg twice daily, Imitrex 100 mg as needed.   6.  Chronic back pain.  Patient is on gabapentin 600 mg twice daily, Celebrex 200 mg twice daily.  Patient may use extra pillow for sleep.   7.  Dry eyes.  Continue cyclosporine and Lotemax eye drops (brought from home).  8.  Morbid obesity/prediabetes.  Continue metformin 500 mg daily.  Hgb a1c 5.3% on 4/5/2024.  Patient taking Wygovy every Wednesday non formulary.  9.  Hyperaldosteronism.  Patient on spironolactone 25 mg daily.   10. Intracranial HTN. "  Patient on Diamox 250 mg daily.  11. Vitamin D deficiency.  Continue daily supplement.   12. Hypokalemia.  Patient's potassium 3.3 on 4/5. One time dose of potassium chloride 40 mEq.  Repeat BMP normal, K 3.8 4/6/24.     The patient was discussed with Dr. Liao and he is in agreement with the above note.

## 2024-04-17 NOTE — SOCIAL WORK
04/17/24    Team Meeting   Meeting Type Daily Rounds Meeting   Team Members Present   Team Members Present Physician;Nurse;;Occupational Therapist   Physician Team Member Pastor AHUJA   Nursing Team Member ANAHI Mad[place]   Social Work Team Member ISA Kulkarni LSW        Patient/Family Present   Patient Present No   Patient's Family present No   Pt ready to d/c today with psych, therapy, ICM services, denies all, contracts for safety

## 2024-04-17 NOTE — NURSING NOTE
"  Patient withdrawn to room relaxing and waiting for her \"headache to subside\". Pleasant and cooperative. Denies any SI/HI,AV/H. anxiety and depression. Medication compliant. Q 7 continuous monitoring maintained.        "

## 2024-04-17 NOTE — PLAN OF CARE
Problem: Ineffective Coping  Goal: Cooperates with admission process  Description: Interventions:   - Complete admission process  Outcome: Adequate for Discharge  Goal: Identifies ineffective coping skills  Outcome: Adequate for Discharge  Goal: Identifies healthy coping skills  Outcome: Adequate for Discharge  Goal: Demonstrates healthy coping skills  Outcome: Adequate for Discharge  Goal: Participates in unit activities  Description: Interventions:  - Provide therapeutic environment   - Provide required programming   - Redirect inappropriate behaviors   Outcome: Adequate for Discharge  Goal: Patient/Family participate in treatment and DC plans  Description: Interventions:  - Provide therapeutic environment  Outcome: Adequate for Discharge  Goal: Patient/Family verbalizes awareness of resources  Outcome: Adequate for Discharge     Problem: Risk for Self Injury/Neglect  Goal: Treatment Goal: Remain safe during length of stay, learn and adopt new coping skills, and be free of self-injurious ideation, impulses and acts at the time of discharge  Outcome: Adequate for Discharge  Goal: Verbalize thoughts and feelings  Description: Interventions:  - Assess and re-assess patient's lethality and potential for self-injury  - Engage patient in 1:1 interactions, daily, for a minimum of 15 minutes  - Encourage patient to express feelings, fears, frustrations, hopes  - Establish rapport/trust with patient   Outcome: Adequate for Discharge  Goal: Refrain from harming self  Description: Interventions:  - Monitor patient closely, per order  - Develop a trusting relationship  - Supervise medication ingestion, monitor effects and side effects   Outcome: Adequate for Discharge  Goal: Complete daily ADLs, including personal hygiene independently, as able  Description: Interventions:  - Observe, teach, and assist patient with ADLS  - Monitor and promote a balance of rest/activity, with adequate nutrition and elimination  Outcome:  Adequate for Discharge     Problem: Depression  Goal: Treatment Goal: Demonstrate behavioral control of depressive symptoms, verbalize feelings of improved mood/affect, and adopt new coping skills prior to discharge  Outcome: Adequate for Discharge  Goal: Verbalize thoughts and feelings  Description: Interventions:  - Assess and re-assess patient's level of risk   - Engage patient in 1:1 interactions, daily, for a minimum of 15 minutes   - Encourage patient to express feelings, fears, frustrations, hopes   Outcome: Adequate for Discharge  Goal: Refrain from isolation  Description: Interventions:  - Develop a trusting relationship   - Encourage socialization   Outcome: Adequate for Discharge  Goal: Refrain from self-neglect  Outcome: Adequate for Discharge     Problem: Anxiety  Goal: Anxiety is at manageable level  Description: Interventions:  - Assess and monitor patient's anxiety level.   - Monitor for signs and symptoms (heart palpitations, chest pain, shortness of breath, headaches, nausea, feeling jumpy, restlessness, irritable, apprehensive).   - Collaborate with interdisciplinary team and initiate plan and interventions as ordered.  - Goehner patient to unit/surroundings  - Explain treatment plan  - Encourage participation in care  - Encourage verbalization of concerns/fears  - Identify coping mechanisms  - Assist in developing anxiety-reducing skills  - Administer/offer alternative therapies  - Limit or eliminate stimulants  Outcome: Adequate for Discharge     Problem: DISCHARGE PLANNING - CARE MANAGEMENT  Goal: Discharge to post-acute care or home with appropriate resources  Description: INTERVENTIONS:  - Conduct assessment to determine patient/family and health care team treatment goals, and need for post-acute services based on payer coverage, community resources, and patient preferences, and barriers to discharge  - Address psychosocial, clinical, and financial barriers to discharge as identified in  assessment in conjunction with the patient/family and health care team  - Arrange appropriate level of post-acute services according to patient’s   needs and preference and payer coverage in collaboration with the physician and health care team  - Communicate with and update the patient/family, physician, and health care team regarding progress on the discharge plan  - Arrange appropriate transportation to post-acute venues  Outcome: Adequate for Discharge     Problem: Ineffective Coping  Goal: Participates in unit activities  Description: Interventions:  - Provide therapeutic environment   - Provide required programming   - Redirect inappropriate behaviors   Outcome: Adequate for Discharge     Problem: Nutrition/Hydration-ADULT  Goal: Nutrient/Hydration intake appropriate for improving, restoring or maintaining nutritional needs  Description: Monitor and assess patient's nutrition/hydration status for malnutrition. Collaborate with interdisciplinary team and initiate plan and interventions as ordered.  Monitor patient's weight and dietary intake as ordered or per policy. Utilize nutrition screening tool and intervene as necessary. Determine patient's food preferences and provide high-protein, high-caloric foods as appropriate.     INTERVENTIONS:  - Monitor oral intake, urinary output, labs, and treatment plans  - Assess nutrition and hydration status and recommend course of action  - Evaluate amount of meals eaten  - Assist patient with eating if necessary   - Allow adequate time for meals  - Recommend/ encourage appropriate diets, oral nutritional supplements, and vitamin/mineral supplements  - Order, calculate, and assess calorie counts as needed  - Recommend, monitor, and adjust tube feedings and TPN/PPN based on assessed needs  - Assess need for intravenous fluids  - Provide specific nutrition/hydration education as appropriate  - Include patient/family/caregiver in decisions related to nutrition  Outcome:  Adequate for Discharge

## 2024-04-17 NOTE — DISCHARGE SUMMARY
"Discharge Summary - Behavioral Health   Gita Rendon 38 y.o. female MRN: [id number]  Unit/Bed#: -02 Encounter: [id number]     Admission Date: 4/4/2024         Discharge Date: 4/17/2024  1:00 PM    Attending Psychiatrist: Dr. Nav Baumann    Reason for Admission/HPI: Mood disorder (HCC) [F39]      According to H&P by Dr. RAVI Said 4/5/24:    History of Present Illness   Gita Rendon is a 38 y.o. female with a history of Major Depressive Disorder, Generalized Anxiety Disorder and OCD who was admitted to the inpatient adult psychiatric unit on a voluntary 201 commitment basis due to depression, anxiety and suicidal ideation without plan.     Patient presents to the walk-in center seeking an inpatient level of care. Patient was tearful with a flat affect throughout assessment. Patient carries diagnoses of Major Depressive Disorder, Generalized Anxiety Disorder, OCD and PTSD. Patient is taking medication for symptoms but reports she does not see any benefits. Patient reports over the last several months her depression continues to worsen. Patient is unable to get out of bed every day and reports she stays in bed and cries every day. Patient is isolating on a daily basis. Patient voiced she has lost 100 pounds from [place] during last year but reports since then, \"I hate myself.\" Patient is having a lot of financial stress as her ex-girlfriend is moving out of the apartment in October. Patient also admits her brother holds her finances by her request as she cannot trust herself with money. Patient has impulsive spending habits and gambling addiction. Patient has history of eating disorder as well. Patient is not currenly caring for her self and has no motivation. Patient reports \"My depression is incredibly out of control.\" Patient reports having many services but none are helping and she does not know what else to do.  Patient has history of several inpatient stays. Patient reports history of SIB by " cutting. Pt denies SI but states 'there is no way out of this and if I had balls I would not be here anymore.' No HI, AH/VH, paranoia or delusions.  Pt has poor appetite and has to force herself to eat since losing 100 lbs. Patient is also not sleeping well due to racing thoughts. Pt denies substance abuse. Patient feels uncomfortable / unable to contract for safety alone at this time. Patient is seeking an inpatient level of care and requests L placemen      Patient was seen and examined today after admission.  She reports being depressed recently after her roommate left her apartment she currently struggles with her finances.  She is not working and her main income comes from disability.  She also reports recently her dog has been sick and she cannot afford treating her dog and considering getting rid of it however she is afraid of losing her emotional support.  She also reports losing a lot of weight in the last year is contributing to her depression now. she is currently seeing Dr. Weston at [facility] and currently prescribed Trintellix 20 mg, cariprazine 3 mg in the morning and 6 at bedtime, gabapentin 300 in the morning and 900 at bedtime, ropinirole 2 mg at bedtime.  She is also taking Vyvanse 70 mg for ADHD. she denies history of bipolar and denies symptoms of psychosis    Hospital Course: The patient was admitted to the inpatient psychiatric unit and started on every 7 minutes precautions. During the hospitalization the patient was attending individual therapy, group therapy, milieu therapy and occupational therapy.    Psychiatric medications were titrated over the hospital stay to address depression, depressive symptoms, anxiety symptoms, and SI without plan.  Gita was continued on mood stabilizer Lithium and Topamax, antipsychotic medication Vraylar, and anxiolytic medication Neurontin.  Trintellix 20 mg daily was also continued for treatment of depression/anxiety.  Vyvanse was switched to  Adderall 30 mg twice daily since not on formulary for treatment of treatment resistant depression.  Medication doses were titrated during the hospital course. Prior to beginning of treatment medications risks and benefits and possible side effects including risk of kidney impairment related to treatment with Lithium, risk of parkinsonian symptoms, Tardive Dyskinesia and metabolic syndrome related to treatment with antipsychotic medications, risk of cardiovascular events in elderly related to treatment with antipsychotic medications, risk of suicidality and serotonin syndrome related to treatment with antidepressants, and risks of cardiovascular side effects including elevated blood pressure, risk of misuse, abuse or dependence and risk of increased anxiety related to treatment with stimulant medications were reviewed with the patient.  Gita verbalized understanding and agreement for treatment; medication education remained ongoing throughout hospitalization.    While inpatient, Gita continued to endorse depression, anxiety, and obsessive thoughts associated with OCD.  Adderall was tapered successfully to discontinuation to reduce mood instability and worsening anxiety.  Patient appeared to be having no benefit of Trintellix therapy and therefore was cross tapered with Lexapro for treatment of anxiety, depression, and OCD.  Depression continued to persist at which point lithium was further titrated for mood stabilization, ongoing depressive symptoms, and reduce SI in the context of mood disorder.  Gabapentin was also increased to TID dosing for anxiety management.  With the preceding medication changes, Gita exhibited significant improvement with mood, self-care, and sleep.  Thoughts were less ruminating on psychosocial stressors and became more linear and goal directed.  Patient was able to maintain safety during her inpatient stay and exhibited no threats or gestures of SIB/SI.    Since Gita was  continuing to maintain mood control and safety, decision was made to begin discharge preparations.  She reported significant decrease in anxiety and depression and presented with congruent affect.  Prior to discharge, Gita verbalized an adequate safety plan which includes talking to her roommate, [facility], or outpatient psychiatric provider.  Also agreeable to utilize crisis hotline or return to the nearest emergency department.  Gita identified her dog and family as strong protective factors against suicide.  Forward thinking with plan to continue caring for her dog and eventually take a trip to [facility] over the summer where her family previously enjoyed time together.    We felt that Gita achieved the maximum benefit of inpatient stay at that point, was at baseline at the end of the hospitalization and could now follow up with outpatient treatment. Prior to discharge  spoke with Gita's roommate (Svetlana) to address support and Gita's readiness for discharge. Svetlana felt comfortable with Gita's discharge. Gita also felt stable and ready for discharge at the end of the hospital stay.    The outpatient follow up with [facility] for psychiatric medication management and Intensive  with [facility]  was arranged by the unit  upon discharge.    On day of discharge, Gita continued to maintain mood control and safety with no signs or symptoms of acute celia, hypomania, or psychosis.  Thoughts were organized, linear, and forward thinking with no delusional content verbalized.  Adamantly denied suicidal or homicidal ideation, intent, or plan upon discharge.  Continued to maintain self-care, appetite, and sleep.    Gita was stabilized and discharged on the following psychotropic regimen: Vraylar 6mg PO QD, Lexapro 10mg PO QD, Gabapentin 300mg PO BID and 900mg PO QHS, Lithium carbonate 450mg PO BID, and topiramate 150mg PO BID. She was  tolerating medications well and denied any side effects at time of discharge.  [facility] with the exception of lithium which was submitted for a 14-day supply with 1 refill.    Last lithium level obtained 4/16/2024 and therapeutic at 0.70.    Mental Status at time of Discharge:     Appearance:  age appropriate, casually dressed, overweight, and well-groomed, short hair, wearing glasses   Behavior:  Cooperative, calm, good eye contact   Speech:  normal pitch and normal volume   Mood:  euthymic   Affect:  mood-congruent   Thought Process:  goal directed and linear, forward thinking   Thought Content:  No overt delusions or paranoia verbalized   Perceptual Disturbances: Denied AVH, did not appear internally preoccupied   Risk Potential: Suicidal Ideations none, Homicidal Ideations none, and Potential for Aggression No   Sensorium:  person, place, time/date, and situation   Cognition:  recent and remote memory grossly intact   Consciousness:  alert and awake    Attention: attention span and concentration were age appropriate   Insight:  Limited, but improved   Judgment: Limited, but improved   Gait/Station: normal gait/station and normal balance   Motor Activity: no abnormal movements     Admission Diagnosis:Mood disorder ([facility]) [F39]    Discharge Diagnosis:   Principal Problem (Resolved):    Depression  Active Problems:    EILEEN (generalized anxiety disorder)    PTSD (post-traumatic stress disorder)    Lab results:  Admission on 04/04/2024, Discharged on 04/17/2024   Component Date Value    Sodium 04/05/2024 141     Potassium 04/05/2024 3.3 (L)     Chloride 04/05/2024 116 (H)     CO2 04/05/2024 17 (L)     ANION GAP 04/05/2024 8     BUN 04/05/2024 19     Creatinine 04/05/2024 0.64     Glucose 04/05/2024 84     Glucose, Fasting 04/05/2024 84     Calcium 04/05/2024 8.7     AST 04/05/2024 17     ALT 04/05/2024 16     Alkaline  Phosphatase 04/05/2024 55     Total Protein 04/05/2024 6.0 (L)     Albumin 04/05/2024 3.6     Total Bilirubin 04/05/2024 0.31     eGFR 04/05/2024 112     WBC 04/05/2024 9.55     RBC 04/05/2024 4.69     Hemoglobin 04/05/2024 13.4     Hematocrit 04/05/2024 42.5     MCV 04/05/2024 91     MCH 04/05/2024 28.6     MCHC 04/05/2024 31.5     RDW 04/05/2024 13.4     MPV 04/05/2024 10.9     Platelets 04/05/2024 268     nRBC 04/05/2024 0     Segmented % 04/05/2024 55     Immature Grans % 04/05/2024 0     Lymphocytes % 04/05/2024 32     Monocytes % 04/05/2024 8     Eosinophils Relative 04/05/2024 4     Basophils Relative 04/05/2024 1     Absolute Neutrophils 04/05/2024 5.28     Absolute Immature Grans 04/05/2024 0.03     Absolute Lymphocytes 04/05/2024 3.04     Absolute Monocytes 04/05/2024 0.74     Eosinophils Absolute 04/05/2024 0.39     Basophils Absolute 04/05/2024 0.07     TSH 3RD GENERATON 04/05/2024 0.153 (L)     Cholesterol 04/05/2024 126     Triglycerides 04/05/2024 88     HDL, Direct 04/05/2024 36 (L)     LDL Calculated 04/05/2024 72     Non-HDL-Chol (CHOL-HDL) 04/05/2024 90     Free T4 04/05/2024 0.89     Hemoglobin A1C 04/05/2024 5.3     EAG 04/05/2024 105     Ventricular Rate 04/05/2024 54     Atrial Rate 04/05/2024 54     MS Interval 04/05/2024 176     QRSD Interval 04/05/2024 94     QT Interval 04/05/2024 416     QTC Interval 04/05/2024 394     P Axis 04/05/2024 45     QRS Axis 04/05/2024 6     T Wave [place] 04/05/2024 18     Folate 04/06/2024 >22.3     Vitamin B-12 04/06/2024 422     Vit D, 25-Hydroxy 04/06/2024 72.5     Sodium 04/06/2024 138     Potassium 04/06/2024 3.8     Chloride 04/06/2024 114 (H)     CO2 04/06/2024 18 (L)     ANION GAP 04/06/2024 6     BUN 04/06/2024 17     Creatinine 04/06/2024 0.72     Glucose 04/06/2024 93     Glucose, Fasting 04/06/2024 93     Calcium 04/06/2024 8.9     eGFR 04/06/2024 105     [place] Lvl 04/09/2024 0.55 (L)     Lithium Lvl 04/16/2024 0.70     Sodium 04/16/2024 140      Potassium 04/16/2024 3.8     Chloride 04/16/2024 114 (H)     CO2 04/16/2024 18 (L)     ANION GAP 04/16/2024 8     BUN 04/16/2024 20     Creatinine 04/16/2024 0.71     Glucose 04/16/2024 90     Glucose, Fasting 04/16/2024 90     Calcium 04/16/2024 9.2     eGFR 04/16/2024 107        Discharge Medications:  Discharge Medication List as of 4/17/2024 10:10 AM        START taking these medications    Details   escitalopram (LEXAPRO) 10 mg tablet Take 1 tablet (10 mg total) by mouth daily, Starting Wed 4/17/2024, Until Fri 5/17/2024, Normal      lithium carbonate (LITHOBID) 450 mg CR tablet Take 1 tablet (450 mg total) by mouth 2 (two) times a day for 28 days, Starting Tue 4/16/2024, Until Tue 5/14/2024, Normal              Discharge Medication List as of 4/17/2024 10:10 AM        STOP taking these medications       Auvelity  MG TBCR Comments:   Reason for Stopping:         Erenumab-aooe (Aimovig) 140 MG/ML SOAJ Comments:   Reason for Stopping:         folic acid (FOLVITE) 1 mg tablet Comments:   Reason for Stopping:         LORazepam (ATIVAN) 0.5 mg tablet Comments:   Reason for Stopping:         rimegepant sulfate (Nurtec) 75 mg TBDP Comments:   Reason for Stopping:         rizatriptan (MAXALT) 10 mg tablet Comments:   Reason for Stopping:         rOPINIRole (REQUIP) 2 mg tablet Comments:   Reason for Stopping:         Trintellix 20 MG tablet Comments:   Reason for Stopping:         Vyvanse 70 MG capsule Comments:   Reason for Stopping:                Discharge Medication List as of 4/17/2024 10:10 AM        CONTINUE these medications which have CHANGED    Details   acetaZOLAMIDE (DIAMOX) 250 mg tablet Take 2 tablets (500 mg total) by mouth 2 (two) times a day, Starting Tue 4/16/2024, Normal      cariprazine (VRAYLAR) 6 MG capsule Take 1 capsule (6 mg total) by mouth daily, Starting Wed 4/17/2024, Until Fri 5/17/2024, Normal      celecoxib (CeleBREX) 200 mg capsule Take 1 capsule (200 mg total) by mouth 2 (two)  times a day, Starting Tue 4/16/2024, Normal      cholecalciferol 1,000 units tablet Take 5 tablets (5,000 Units total) by mouth daily, Starting Tue 4/16/2024, Normal      gabapentin (NEURONTIN) 300 mg capsule Take 1 capsule (300 mg total) by mouth 2 (two) times a day, Starting Tue 4/16/2024, Normal      gabapentin (NEURONTIN) 600 MG tablet Take 1.5 tablets (900 mg total) by mouth daily at bedtime, Starting Tue 4/16/2024, Normal      levothyroxine 112 mcg tablet Take 1 tablet (112 mcg total) by mouth daily in the early morning, Starting Wed 4/17/2024, Normal      loratadine (CLARITIN) 10 mg tablet Take 1 tablet (10 mg total) by mouth daily, Starting Tue 4/16/2024, Normal      metFORMIN (GLUCOPHAGE) 500 mg tablet Take 1 tablet (500 mg total) by mouth daily with breakfast, Starting Tue 4/16/2024, Normal      omeprazole (PriLOSEC) 20 mg delayed release capsule Take 1 capsule (20 mg total) by mouth 2 (two) times a day, Starting Tue 4/16/2024, Normal      propranolol (INDERAL) 20 mg tablet Take 1 tablet (20 mg total) by mouth every 12 (twelve) hours, Starting Tue 4/16/2024, Normal      Semaglutide-Weight Management (WEGOVY) 1.7 MG/0.75ML Inject 0.75 mL (1.7 mg total) under the skin once a week, Starting Tue 4/16/2024, Until Thu 5/16/2024, Normal      spironolactone (ALDACTONE) 25 mg tablet Take 1 tablet (25 mg total) by mouth daily, Starting Tue 4/16/2024, Normal      topiramate (TOPAMAX) 50 MG tablet Take 3 tablets (150 mg total) by mouth 2 (two) times a day, Starting Tue 4/16/2024, Normal              Discharge Medication List as of 4/17/2024 10:10 AM        CONTINUE these medications which have NOT CHANGED    Details   Cequa 0.09 % SOLN Apply 1 drop to eye 2 (two) times a day, Starting Sat 8/5/2023, Normal      linaCLOtide (Linzess) 290 MCG CAPS Take 1 capsule by mouth daily, Starting Wed 3/20/2024, Until Mon 9/16/2024, Normal      loteprednol etabonate (LOTEMAX) 0.5 % ophthalmic suspension Historical Med      Multiple  Vitamin (MULTIVITAMIN) capsule Take 1 capsule by mouth daily, Historical Med              Discharge instructions/Information to patient and family:   See after visit summary for information provided to patient and family.      Provisions for Follow-Up Care:  See after visit summary for information related to follow-up care and any pertinent home health orders.      Discharge Statement:    I spent 30 minutes discharging the patient. This time was spent on the day of discharge. I had direct contact with the patient on the day of discharge.     Additional documentation is required if more than 30 minutes were spent on discharge:    I reviewed with Gita importance of compliance with medications and outpatient treatment after discharge.  I discussed the medication regimen and possible side effects of the medications with Gita prior to discharge. At the time of discharge she was tolerating psychiatric medications.  I discussed outpatient follow up with Gita.  I reviewed with Gita crisis plan and safety plan upon discharge.  Gita agreed to abstain from drug and alcohol use after discharge.  Gita has been filing controlled prescriptions on time as prescribed according to [place] Prescription Drug Monitoring Program.    LEIGHA Kaiser 04/17/24

## 2024-04-17 NOTE — TELEPHONE ENCOUNTER
Reason for call:   [x] Refill   [] Prior Auth  [x] Other: THIS IS NOT A DUP. MEDICATION WAS SENT TO THE WRONG PHARMACY YESTERDAY. PLEASE RESEND TO [facility]    Office:   [] PCP/Provider -   [x] Specialty/Provider - Weight man/Darcy Duran PA-C     Medication: Semaglutide-Weight Management (WEGOVY) 1.7 MG/0.75ML     Dose/Frequency: Inject 0.75 mL (1.7 mg total) under the skin once a week     Quantity: 3ml    Pharmacy: [facility] - [place], [place] - [facility] [address] [phone]    Does the patient have enough for 3 days?   [x] Yes   [] No - Send as HP to POD

## 2024-04-17 NOTE — BH TRANSITION RECORD
Contact Information: If you have any questions, concerns, pended studies, tests and/or procedures, or emergencies regarding your inpatient behavioral health visit. Please contact [facility] # [phone] and ask to speak to a , nurse or physician. A contact is available 24 hours/ 7 days a week at this number.     Summary of Procedures Performed During your Stay:  Below is a list of major procedures performed during your hospital stay and a summary of results:  - No major procedures performed.    Pending Studies (From admission, onward)      None          Please follow up on the above pending studies with your PCP and/or referring provider.

## 2024-04-18 ENCOUNTER — TELEPHONE (OUTPATIENT)
Dept: BEHAVIORAL/MENTAL HEALTH CLINIC | Facility: CLINIC | Age: 39
End: 2024-04-18

## 2024-04-18 NOTE — TELEPHONE ENCOUNTER
As previously stated:      [x] Other: THIS IS NOT A DUP. MEDICATION WAS SENT TO THE WRONG PHARMACY YESTERDAY. PLEASE RESEND TO [facility]

## 2024-04-18 NOTE — TELEPHONE ENCOUNTER
Telephone call to PT as follow up from Walk-In Center visit and hospital discharge.     PT is scheduled for medication management on 4/23/24 and therapy on 5/1/24 through [facility].     PT has an ICM through [facility] with an appointment on 4/22/2024.     PT denies needing additional linkage to services at this time.     Discussion held with patient to call [facility] crisis, return to walk-in center, call 911 or go to the nearest emergency room immediately if their situation changes/worsens.

## 2024-04-19 DIAGNOSIS — E87.20 ACIDOSIS: Primary | ICD-10-CM

## 2024-04-21 ENCOUNTER — OFFICE VISIT (OUTPATIENT)
Dept: URGENT CARE | Facility: CLINIC | Age: 39
End: 2024-04-21
Payer: COMMERCIAL

## 2024-04-21 VITALS
SYSTOLIC BLOOD PRESSURE: 92 MMHG | HEART RATE: 64 BPM | OXYGEN SATURATION: 99 % | TEMPERATURE: 98.4 F | DIASTOLIC BLOOD PRESSURE: 60 MMHG | RESPIRATION RATE: 16 BRPM

## 2024-04-21 DIAGNOSIS — N39.0 URINARY TRACT INFECTION WITHOUT HEMATURIA, SITE UNSPECIFIED: Primary | ICD-10-CM

## 2024-04-21 DIAGNOSIS — R30.0 DYSURIA: ICD-10-CM

## 2024-04-21 LAB
SL AMB  POCT GLUCOSE, UA: NEGATIVE
SL AMB LEUKOCYTE ESTERASE,UA: NEGATIVE
SL AMB POCT BILIRUBIN,UA: NEGATIVE
SL AMB POCT BLOOD,UA: ABNORMAL
SL AMB POCT CLARITY,UA: CLEAR
SL AMB POCT COLOR,UA: ABNORMAL
SL AMB POCT KETONES,UA: 40
SL AMB POCT NITRITE,UA: NEGATIVE
SL AMB POCT PH,UA: 5
SL AMB POCT SPECIFIC GRAVITY,UA: 1.1
SL AMB POCT URINE PROTEIN: NEGATIVE
SL AMB POCT UROBILINOGEN: 4

## 2024-04-21 PROCEDURE — 87086 URINE CULTURE/COLONY COUNT: CPT

## 2024-04-21 PROCEDURE — S9088 SERVICES PROVIDED IN URGENT: HCPCS

## 2024-04-21 PROCEDURE — 87147 CULTURE TYPE IMMUNOLOGIC: CPT

## 2024-04-21 PROCEDURE — 99213 OFFICE O/P EST LOW 20 MIN: CPT

## 2024-04-21 PROCEDURE — 81002 URINALYSIS NONAUTO W/O SCOPE: CPT

## 2024-04-21 RX ORDER — ELETRIPTAN HYDROBROMIDE 40 MG/1
40 TABLET, FILM COATED ORAL ONCE AS NEEDED
COMMUNITY

## 2024-04-21 RX ORDER — PHENAZOPYRIDINE HYDROCHLORIDE 100 MG/1
100 TABLET, FILM COATED ORAL 3 TIMES DAILY PRN
Qty: 10 TABLET | Refills: 0 | Status: SHIPPED | OUTPATIENT
Start: 2024-04-21

## 2024-04-21 RX ORDER — NITROFURANTOIN 25; 75 MG/1; MG/1
100 CAPSULE ORAL 2 TIMES DAILY
Qty: 10 CAPSULE | Refills: 0 | Status: SHIPPED | OUTPATIENT
Start: 2024-04-21 | End: 2024-04-26

## 2024-04-21 RX ORDER — FUROSEMIDE 20 MG/1
20 TABLET ORAL DAILY PRN
COMMUNITY

## 2024-04-21 RX ORDER — LORAZEPAM 0.5 MG/1
0.5 TABLET ORAL 2 TIMES DAILY
COMMUNITY

## 2024-04-21 NOTE — PROGRESS NOTES
[facility]        NAME: Gita Rendon is a 38 y.o. female  : 1985    MRN: [id number]  DATE: 2024  TIME: 12:13 PM    Assessment and Plan   Urinary tract infection without hematuria, site unspecified [N39.0]  1. Urinary tract infection without hematuria, site unspecified  nitrofurantoin (MACROBID) 100 mg capsule      2. Dysuria  Urine culture    POCT urine dip    phenazopyridine (PYRIDIUM) 100 mg tablet      Will treat for UTI based on symptoms  Poct negative for leukocytes, or nitrates  Will send culture    Patient Instructions   Take probiotic or live active cultured yogurt with antibiotics  Take macrobid as prescribed   Drink plenty of water   Cranberry supplements  Follow up with PCP in 3-5 days.  Proceed to  ER if symptoms worsen.    If tests are performed, our office will contact you with results only if changes need to made to the care plan discussed with you at the visit. [facility]    Chief Complaint     Chief Complaint   Patient presents with    Possible UTI     Urgency, frequency and cloudy urine  Started 3 days ago          History of Present Illness       Patient reports today with a 3 day history of urinary burning and frequency. She was treated with Cipro one month ago for UTI in the ED and symptoms were the same. She denies discharge or itchiness.         Review of Systems   Review of Systems   Genitourinary:  Positive for dysuria, frequency and urgency.   All other systems reviewed and are negative.        Current Medications       Current Outpatient Medications:     acetaZOLAMIDE (DIAMOX) 250 mg tablet, Take 2 tablets (500 mg total) by mouth 2 (two) times a day, Disp: 120 tablet, Rfl: 0    cariprazine (VRAYLAR) 6 MG capsule, Take 1 capsule (6 mg total) by mouth daily, Disp: 30 capsule, Rfl: 0    celecoxib (CeleBREX) 200 mg capsule, Take 1 capsule (200 mg total) by mouth 2 (two) times a day, Disp: 60 capsule, Rfl: 0    Cequa  0.09 % SOLN, Apply 1 drop to eye 2 (two) times a day, Disp: 60 each, Rfl: 0    cholecalciferol 1,000 units tablet, Take 5 tablets (5,000 Units total) by mouth daily, Disp: 150 tablet, Rfl: 0    eletriptan (RELPAX) 40 MG tablet, Take 40 mg by mouth once as needed for migraine may repeat in 2 hours if necessary, Disp: , Rfl:     Erenumab-aooe (AIMOVIG, 140 MG DOSE, SC), Inject 140 mg under the skin every 28 days, Disp: , Rfl:     escitalopram (LEXAPRO) 10 mg tablet, Take 1 tablet (10 mg total) by mouth daily, Disp: 30 tablet, Rfl: 0    furosemide (LASIX) 20 mg tablet, Take 20 mg by mouth daily as needed, Disp: , Rfl:     gabapentin (NEURONTIN) 300 mg capsule, Take 1 capsule (300 mg total) by mouth 2 (two) times a day, Disp: 60 capsule, Rfl: 0    gabapentin (NEURONTIN) 600 MG tablet, Take 1.5 tablets (900 mg total) by mouth daily at bedtime, Disp: 45 tablet, Rfl: 0    levothyroxine 112 mcg tablet, Take 1 tablet (112 mcg total) by mouth daily in the early morning, Disp: 30 tablet, Rfl: 0    linaCLOtide (Linzess) 290 MCG CAPS, Take 1 capsule by mouth daily, Disp: 90 capsule, Rfl: 1    lithium carbonate (LITHOBID) 450 mg CR tablet, Take 1 tablet (450 mg total) by mouth 2 (two) times a day for 28 days, Disp: 28 tablet, Rfl: 1    loratadine (CLARITIN) 10 mg tablet, Take 1 tablet (10 mg total) by mouth daily, Disp: 30 tablet, Rfl: 0    LORazepam (ATIVAN) 0.5 mg tablet, Take 0.5 mg by mouth 2 (two) times a day prn, Disp: , Rfl:     loteprednol etabonate (LOTEMAX) 0.5 % ophthalmic suspension, , Disp: , Rfl:     metFORMIN (GLUCOPHAGE) 500 mg tablet, Take 1 tablet (500 mg total) by mouth daily with breakfast, Disp: 30 tablet, Rfl: 0    Multiple Vitamin (MULTIVITAMIN) capsule, Take 1 capsule by mouth daily, Disp: , Rfl:     nitrofurantoin (MACROBID) 100 mg capsule, Take 1 capsule (100 mg total) by mouth 2 (two) times a day for 5 days, Disp: 10 capsule, Rfl: 0    omeprazole (PriLOSEC) 20 mg delayed release capsule, Take 1 capsule  (20 mg total) by mouth 2 (two) times a day, Disp: 60 capsule, Rfl: 0    phenazopyridine (PYRIDIUM) 100 mg tablet, Take 1 tablet (100 mg total) by mouth 3 (three) times a day as needed for bladder spasms, Disp: 10 tablet, Rfl: 0    propranolol (INDERAL) 20 mg tablet, Take 1 tablet (20 mg total) by mouth every 12 (twelve) hours, Disp: 60 tablet, Rfl: 0    Semaglutide-Weight Management (WEGOVY) 1.7 MG/0.75ML, Inject 0.75 mL (1.7 mg total) under the skin once a week, Disp: 3 mL, Rfl: 0    spironolactone (ALDACTONE) 25 mg tablet, Take 1 tablet (25 mg total) by mouth daily, Disp: 30 tablet, Rfl: 0    topiramate (TOPAMAX) 50 MG tablet, Take 3 tablets (150 mg total) by mouth 2 (two) times a day, Disp: 180 tablet, Rfl: 0    Current Allergies     Allergies as of 04/21/2024 - Reviewed 04/21/2024   Allergen Reaction Noted    Doxycycline  03/28/2019    Other Other (See Comments) 02/26/2018            The following portions of the patient's history were reviewed and updated as appropriate: allergies, current medications, past family history, past medical history, past social history, past surgical history and problem list.     Past Medical History:   Diagnosis Date    Allergic     Allergic rhinitis     Anorexia nervosa in remission     Anxiety     Arthritis 5/8/2014    Back pain     Bipolar disorder (HCC)     Depression     Dermatitis 10/16/2021    Disease of thyroid gland     Diverticulitis of colon 9/20/2015    Dizziness     Ear problems     Eating disorder     Esophageal reflux 08/15/2013    GERD (gastroesophageal reflux disease) 8/15/2013    Headache(784.0)     Headache, tension-type     Hypertension     Hypothyroid     Idiopathic intracranial hypertension     Lumbar degenerative disc disease 05/08/2014    Migraine     Nasal congestion     Nosebleed     Obesity     Obsessive-compulsive disorder     Otitis media     Overactive bladder     Psychiatric disorder     Restless leg syndrome, controlled     Seasonal allergies      Sinusitis     Sleep apnea     Sleep difficulties     Suicide and self-inflicted injury (HCC)     Tinnitus     TMJ dysfunction     Tonsillitis     Transcranial Magnetic Stimulation 09/06/2021    Urinary tract infection     Vision loss        Past Surgical History:   Procedure Laterality Date    DENTAL SURGERY      wisdom teeth-at [age] years. Other surgery dental extraxtion of bone spur (10 years ago)    IR LUMBAR PUNCTURE  02/26/2019    SINUS ENDOSCOPY      SINUS SURGERY      TONSILLECTOMY         Family History   Problem Relation Age of Onset    Mental illness Mother     Depression Mother     Suicide Attempts Mother     Hypertension Mother     Diabetes Mother     Other Mother         bicuspid aortic valve    Anxiety disorder Mother     Psychiatric Illness Mother     Schizoaffective Disorder  Mother     Anemia Mother     Hypertension Father     Hypothyroidism Father     Thyroid disease Father     Hypertension Brother     Cancer Maternal Grandmother     Heart disease Maternal Grandmother     Stroke Maternal Grandmother     Breast cancer Maternal Grandmother     Skin cancer Maternal Grandmother     Arthritis Maternal Grandmother     Pneumonia Maternal Grandfather     Cancer Maternal Grandfather     Dementia Maternal Grandfather     COPD Maternal Grandfather     Cancer Paternal Grandmother     Pneumonia Paternal Grandfather     Arthritis Family     Breast cancer Family     Osteopenia Family     Osteoporosis Family     Hypertension Family     Transient ischemic attack Family          Medications have been verified.        Objective   BP 92/60   Pulse 64   Temp 98.4 °F (36.9 °C)   Resp 16   LMP 03/11/2024 (Approximate)   SpO2 99%        Physical Exam     Physical Exam  Constitutional:       Appearance: Normal appearance.   Cardiovascular:      Rate and Rhythm: Normal rate.      Pulses: Normal pulses.      Heart sounds: Normal heart sounds. No murmur heard.  Pulmonary:      Effort: Pulmonary effort is normal.       Breath sounds: Normal breath sounds. No wheezing, rhonchi or rales.   Abdominal:      General: Abdomen is flat. Bowel sounds are normal. There is no distension.      Palpations: Abdomen is soft.      Tenderness: There is no abdominal tenderness. There is no right CVA tenderness, left CVA tenderness, guarding or rebound.   Neurological:      Mental Status: She is alert.

## 2024-04-21 NOTE — PATIENT INSTRUCTIONS
Take probiotic or live active cultured yogurt with antibiotics  Take macrobid as prescribed   Drink plenty of water   Cranberry supplements  Follow up with PCP in 3-5 days.  Proceed to  ER if symptoms worsen.    If tests are performed, our office will contact you with results only if changes need to made to the care plan discussed with you at the visit. You can review your full results on [facility].

## 2024-04-22 DIAGNOSIS — N39.0 URINARY TRACT INFECTION WITHOUT HEMATURIA, SITE UNSPECIFIED: Primary | ICD-10-CM

## 2024-04-22 LAB
BACTERIA UR CULT: ABNORMAL
BACTERIA UR CULT: ABNORMAL

## 2024-04-22 RX ORDER — AMOXICILLIN 500 MG/1
500 CAPSULE ORAL EVERY 12 HOURS SCHEDULED
Qty: 14 CAPSULE | Refills: 0 | Status: SHIPPED | OUTPATIENT
Start: 2024-04-22 | End: 2024-04-29

## 2024-04-24 ENCOUNTER — TELEPHONE (OUTPATIENT)
Age: 39
End: 2024-04-24

## 2024-04-24 NOTE — TELEPHONE ENCOUNTER
Pt called in, states she has been seeing [facility] for her headaches, but has been too far. Would like referral to [facility] to be able see them as they are closer for her. Please fax to [phone].

## 2024-04-25 ENCOUNTER — OFFICE VISIT (OUTPATIENT)
Dept: BARIATRICS | Facility: CLINIC | Age: 39
End: 2024-04-25
Payer: COMMERCIAL

## 2024-04-25 VITALS
SYSTOLIC BLOOD PRESSURE: 116 MMHG | TEMPERATURE: 98.1 F | WEIGHT: 293 LBS | HEART RATE: 65 BPM | RESPIRATION RATE: 20 BRPM | OXYGEN SATURATION: 98 % | DIASTOLIC BLOOD PRESSURE: 76 MMHG | BODY MASS INDEX: 45.99 KG/M2 | HEIGHT: 67 IN

## 2024-04-25 DIAGNOSIS — R73.03 PREDIABETES: ICD-10-CM

## 2024-04-25 DIAGNOSIS — E66.01 MORBID OBESITY (HCC): Primary | ICD-10-CM

## 2024-04-25 PROCEDURE — 99214 OFFICE O/P EST MOD 30 MIN: CPT | Performed by: PHYSICIAN ASSISTANT

## 2024-04-25 PROCEDURE — G2211 COMPLEX E/M VISIT ADD ON: HCPCS | Performed by: PHYSICIAN ASSISTANT

## 2024-04-25 NOTE — ASSESSMENT & PLAN NOTE
-Patient is pursuing Conservative Program  -Initial weight loss goal of 5-10% weight loss for improved health- met  -Screening labs: reviewed, up to date  -dietary/lifestyle changes, continue to work with BH  -On Topamax for headaches  -No longer on Wellbutrin for mood  -avoid Phentermine  -Was on Ozempic and now switched to Wegovy. Patient to continue on 1.7mg dose until I see her back. Recently having very little appetite, Now improved since depression is better controlled.  -She will work on increasing water to goal, reducing artificially sweetened drinks and increasing fruit and veggies  -medication agreement signed    Initial: 421 lbs  Last OV: 351.4 lbs  Current: 337 lbs  Change: -84 lbs  Goal: wants to feel healthy

## 2024-04-25 NOTE — ASSESSMENT & PLAN NOTE
-On Metformin 500mg daily  -On Wegovy 1.7mg  -most recent HgbA1c 5.3 down from 6.1  - Denies personal hx of pancreatitis or family hx of MEN2/MTC

## 2024-04-25 NOTE — PROGRESS NOTES
Assessment/Plan:    Morbid obesity (HCC)  -Patient is pursuing Conservative Program  -Initial weight loss goal of 5-10% weight loss for improved health- met  -Screening labs: reviewed, up to date  -dietary/lifestyle changes, continue to work with BH  -On Topamax for headaches  -No longer on Wellbutrin for mood  -avoid Phentermine  -Was on Ozempic and now switched to Wegovy. Patient to continue on 1.7mg dose until I see her back. Recently having very little appetite, Now improved since depression is better controlled.  -She will work on increasing water to goal, reducing artificially sweetened drinks and increasing fruit and veggies  -medication agreement signed    Initial: 421 lbs  Last OV: 351.4 lbs  Current: 337 lbs  Change: -84 lbs  Goal: wants to feel healthy    Prediabetes  -On Metformin 500mg daily  -On Wegovy 1.7mg  -most recent HgbA1c 5.3 down from 6.1  - Denies personal hx of pancreatitis or family hx of MEN2/MTC    Goals:    Food log (ie.) [email or web address],etc.   No sugary beverages. At least 64oz of water daily.  Increase physical activity by 10 minutes daily. Gradually increase physical activity to a goal of 5 days per week for 30 minutes of MODERATE intensity PLUS 2 days per week of FULL BODY resistance training      Follow up in approximately 3 months with Non-Surgical Physician/Advanced Practitioner.     Diagnoses and all orders for this visit:    Morbid obesity (HCC)    Prediabetes          Subjective:   Chief Complaint   Patient presents with    Follow-up        Patient ID: Gita Rendon  is a 38 y.o. female with excess weight/obesity here to pursue weight managment.  Patient is pursuing Conservative Program.     HPI Patient presents for MWM follow up. Remains on Wegovy 1.7mg. Tolerating well. Reports she was just hospitalized for worsening depression. Reports she had absolutely no desire to eat at all. She attributes a lot of this to her  "depression. She is feeling improved since her medications were adjusted, she has a lot of the same stressors but trying to cope with these.  SHe reports she is functioning better. Reports not a lot of food is appealing to her but is eating more now    -Continues to see [place] [facility].  Reports not much change in her headaches since last visit    Hydration: water minimal, diet iced tea, rare chocolate milk  Exercise: denies    -reports she is eating sporadically, doesn't have set meal times    B: Tea biscuit with butter or Mann egg and cheese breakfast sandwich  L:  hot and sour soup or Faroese  S: chicken sandwich from [facility]  D: left over pierogies   S: grilled chicken strips + hot sauce      Wt Readings from Last 10 Encounters:   04/25/24 (!) 153 kg (337 lb)   04/14/24 (!) 150 kg (329 lb 9.6 oz)   03/22/24 (!) 152 kg (334 lb 8 oz)   03/15/24 (!) 153 kg (338 lb 6.4 oz)   02/21/24 (!) 155 kg (342 lb 3.2 oz)   02/09/24 (!) 157 kg (346 lb 6.4 oz)   02/01/24 (!) 159 kg (351 lb 6.4 oz)   01/09/24 (!) 159 kg (350 lb)   11/19/23 (!) 162 kg (357 lb)   11/08/23 (!) 170 kg (374 lb 6.4 oz)        The following portions of the patient's history were reviewed and updated as appropriate: allergies, current medications, past family history, past medical history, past social history, past surgical history, and problem list.    Review of Systems   Gastrointestinal:  Positive for constipation and nausea (mild).   Psychiatric/Behavioral:  Positive for dysphoric mood (improved).        Objective:    /76 (BP Location: Right arm, Patient Position: Sitting, Cuff Size: Large)   Pulse 65   Temp 98.1 °F (36.7 °C)   Resp 20   Ht 5' 7\" (1.702 m)   Wt (!) 153 kg (337 lb)   LMP 03/11/2024 (Approximate)   SpO2 98%   BMI 52.78 kg/m²      Physical Exam  Vitals and nursing note reviewed.   Constitutional:       General: She is not in acute distress.     Appearance: She is obese. She is not ill-appearing or toxic-appearing. "   HENT:      Head: Normocephalic and atraumatic.      Mouth/Throat:      Mouth: Mucous membranes are moist.   Eyes:      General: No scleral icterus.  Pulmonary:      Effort: Pulmonary effort is normal. No respiratory distress.   Abdominal:      Comments: Obese, protuberant   Musculoskeletal:      Right lower leg: No edema.      Left lower leg: No edema.   Skin:     General: Skin is dry.      Coloration: Skin is not jaundiced.   Neurological:      General: No focal deficit present.      Mental Status: She is alert and oriented to person, place, and time. Mental status is at baseline.   Psychiatric:         Behavior: Behavior normal.         Thought Content: Thought content normal.         Judgment: Judgment normal.

## 2024-04-26 ENCOUNTER — TRANSITIONAL CARE MANAGEMENT (OUTPATIENT)
Dept: FAMILY MEDICINE CLINIC | Facility: CLINIC | Age: 39
End: 2024-04-26

## 2024-04-26 ENCOUNTER — TELEPHONE (OUTPATIENT)
Age: 39
End: 2024-04-26

## 2024-04-26 NOTE — TELEPHONE ENCOUNTER
Patient called in stating she was seen in urgent care 4/21 for a UTI. Patient was prescribed antibiotic. Patient called in wanting to make an apt for continuing symptoms of UTI, discuss medication. Patient was also discharged from hospital 4/4 and didn't know if she needed a follow up. The was no apt's available until the end of May. Warm transferred to [facility].

## 2024-04-28 ENCOUNTER — OFFICE VISIT (OUTPATIENT)
Dept: URGENT CARE | Facility: CLINIC | Age: 39
End: 2024-04-28
Payer: COMMERCIAL

## 2024-04-28 VITALS
OXYGEN SATURATION: 100 % | RESPIRATION RATE: 18 BRPM | HEART RATE: 64 BPM | SYSTOLIC BLOOD PRESSURE: 123 MMHG | DIASTOLIC BLOOD PRESSURE: 81 MMHG | TEMPERATURE: 98.2 F

## 2024-04-28 DIAGNOSIS — K59.09 CHRONIC CONSTIPATION: ICD-10-CM

## 2024-04-28 DIAGNOSIS — N30.90 CYSTITIS: Primary | ICD-10-CM

## 2024-04-28 LAB
SL AMB  POCT GLUCOSE, UA: ABNORMAL
SL AMB LEUKOCYTE ESTERASE,UA: ABNORMAL
SL AMB POCT BILIRUBIN,UA: ABNORMAL
SL AMB POCT BLOOD,UA: ABNORMAL
SL AMB POCT CLARITY,UA: CLEAR
SL AMB POCT COLOR,UA: ABNORMAL
SL AMB POCT KETONES,UA: ABNORMAL
SL AMB POCT NITRITE,UA: ABNORMAL
SL AMB POCT PH,UA: 6
SL AMB POCT SPECIFIC GRAVITY,UA: 1.03
SL AMB POCT URINE PROTEIN: ABNORMAL
SL AMB POCT UROBILINOGEN: 0.2

## 2024-04-28 PROCEDURE — 99213 OFFICE O/P EST LOW 20 MIN: CPT

## 2024-04-28 PROCEDURE — 87086 URINE CULTURE/COLONY COUNT: CPT

## 2024-04-28 PROCEDURE — 81002 URINALYSIS NONAUTO W/O SCOPE: CPT

## 2024-04-28 PROCEDURE — S9088 SERVICES PROVIDED IN URGENT: HCPCS

## 2024-04-28 RX ORDER — PHENAZOPYRIDINE HYDROCHLORIDE 100 MG/1
100 TABLET, FILM COATED ORAL 3 TIMES DAILY PRN
Qty: 10 TABLET | Refills: 0 | Status: SHIPPED | OUTPATIENT
Start: 2024-04-28

## 2024-04-28 NOTE — PROGRESS NOTES
[facility]        NAME: Gita Rendon is a 38 y.o. female  : 1985    MRN: [id number]  DATE: 2024  TIME: 11:53 AM    Assessment and Plan   Cystitis [N30.90]  1. Cystitis  POCT urine dip    Urine culture    phenazopyridine (PYRIDIUM) 100 mg tablet      2. Chronic constipation        POCT urine negative will send for culture  Continue Amoxicillin  Restart OTC colace for chronic constipation  Will treat symptoms of urgency and dysuria  Patient Instructions     Continue Amoxicillin  Restart OTC colace for chronic constipation  Follow up with PCP in 3-5 days.  Proceed to  ER if symptoms worsen.    If tests are performed, our office will contact you with results only if changes need to made to the care plan discussed with you at the visit. You can review your full results on [facility].    Chief Complaint     Chief Complaint   Patient presents with    Urinary Frequency     With urgency initial onset 1 week ago seen here and placed on antibiotic pain has subsideed but feels the frequency has got worse          History of Present Illness       Patient is here today for report of continued symptoms of UTI including burning and frequency. Patient is continuing to take amoxicillin as culture showed GBS. Discussed with patient following up with urology and PCP on Monday for further evaluation as patient reports she has had problem in the past with over active bladder     Urinary Frequency   Associated symptoms include frequency.       Review of Systems   Review of Systems   Genitourinary:  Positive for frequency.         Current Medications       Current Outpatient Medications:     acetaZOLAMIDE (DIAMOX) 250 mg tablet, Take 2 tablets (500 mg total) by mouth 2 (two) times a day, Disp: 120 tablet, Rfl: 0    amoxicillin (AMOXIL) 500 mg capsule, Take 1 capsule (500 mg total) by mouth every 12 (twelve) hours for 7 days, Disp: 14 capsule, Rfl: 0    cariprazine (VRAYLAR) 6 MG capsule, Take 1  capsule (6 mg total) by mouth daily, Disp: 30 capsule, Rfl: 0    celecoxib (CeleBREX) 200 mg capsule, Take 1 capsule (200 mg total) by mouth 2 (two) times a day, Disp: 60 capsule, Rfl: 0    Cequa 0.09 % SOLN, Apply 1 drop to eye 2 (two) times a day, Disp: 60 each, Rfl: 0    cholecalciferol 1,000 units tablet, Take 5 tablets (5,000 Units total) by mouth daily, Disp: 150 tablet, Rfl: 0    eletriptan (RELPAX) 40 MG tablet, Take 40 mg by mouth once as needed for migraine may repeat in 2 hours if necessary, Disp: , Rfl:     Erenumab-aooe (AIMOVIG, 140 MG DOSE, SC), Inject 140 mg under the skin every 28 days, Disp: , Rfl:     escitalopram (LEXAPRO) 10 mg tablet, Take 1 tablet (10 mg total) by mouth daily, Disp: 30 tablet, Rfl: 0    furosemide (LASIX) 20 mg tablet, Take 20 mg by mouth daily as needed, Disp: , Rfl:     gabapentin (NEURONTIN) 300 mg capsule, Take 1 capsule (300 mg total) by mouth 2 (two) times a day, Disp: 60 capsule, Rfl: 0    gabapentin (NEURONTIN) 600 MG tablet, Take 1.5 tablets (900 mg total) by mouth daily at bedtime, Disp: 45 tablet, Rfl: 0    levothyroxine 112 mcg tablet, Take 1 tablet (112 mcg total) by mouth daily in the early morning, Disp: 30 tablet, Rfl: 0    linaCLOtide (Linzess) 290 MCG CAPS, Take 1 capsule by mouth daily, Disp: 90 capsule, Rfl: 1    lithium carbonate (LITHOBID) 450 mg CR tablet, Take 1 tablet (450 mg total) by mouth 2 (two) times a day for 28 days, Disp: 28 tablet, Rfl: 1    loratadine (CLARITIN) 10 mg tablet, Take 1 tablet (10 mg total) by mouth daily, Disp: 30 tablet, Rfl: 0    LORazepam (ATIVAN) 0.5 mg tablet, Take 0.5 mg by mouth 2 (two) times a day prn, Disp: , Rfl:     loteprednol etabonate (LOTEMAX) 0.5 % ophthalmic suspension, , Disp: , Rfl:     metFORMIN (GLUCOPHAGE) 500 mg tablet, Take 1 tablet (500 mg total) by mouth daily with breakfast, Disp: 30 tablet, Rfl: 0    Multiple Vitamin (MULTIVITAMIN) capsule, Take 1 capsule by mouth daily, Disp: , Rfl:     omeprazole  (PriLOSEC) 20 mg delayed release capsule, Take 1 capsule (20 mg total) by mouth 2 (two) times a day, Disp: 60 capsule, Rfl: 0    phenazopyridine (PYRIDIUM) 100 mg tablet, Take 1 tablet (100 mg total) by mouth 3 (three) times a day as needed for bladder spasms, Disp: 10 tablet, Rfl: 0    phenazopyridine (PYRIDIUM) 100 mg tablet, Take 1 tablet (100 mg total) by mouth 3 (three) times a day as needed for bladder spasms, Disp: 10 tablet, Rfl: 0    propranolol (INDERAL) 20 mg tablet, Take 1 tablet (20 mg total) by mouth every 12 (twelve) hours, Disp: 60 tablet, Rfl: 0    Semaglutide-Weight Management (WEGOVY) 1.7 MG/0.75ML, Inject 0.75 mL (1.7 mg total) under the skin once a week, Disp: 3 mL, Rfl: 0    spironolactone (ALDACTONE) 25 mg tablet, Take 1 tablet (25 mg total) by mouth daily, Disp: 30 tablet, Rfl: 0    topiramate (TOPAMAX) 50 MG tablet, Take 3 tablets (150 mg total) by mouth 2 (two) times a day, Disp: 180 tablet, Rfl: 0    Current Allergies     Allergies as of 04/28/2024 - Reviewed 04/28/2024   Allergen Reaction Noted    Doxycycline  03/28/2019    Other Other (See Comments) 02/26/2018            The following portions of the patient's history were reviewed and updated as appropriate: allergies, current medications, past family history, past medical history, past social history, past surgical history and problem list.     Past Medical History:   Diagnosis Date    Allergic     Allergic rhinitis     Anorexia nervosa in remission     Anxiety     Arthritis 5/8/2014    Back pain     Bipolar disorder (HCC)     Depression     Dermatitis 10/16/2021    Disease of thyroid gland     Diverticulitis of colon 9/20/2015    Dizziness     Ear problems     Eating disorder     Esophageal reflux 08/15/2013    GERD (gastroesophageal reflux disease) 8/15/2013    Headache(784.0)     Headache, tension-type     Hypertension     Hypothyroid     Idiopathic intracranial hypertension     Lumbar degenerative disc disease 05/08/2014     Migraine     Nasal congestion     Nosebleed     Obesity     Obsessive-compulsive disorder     Otitis media     Overactive bladder     Psychiatric disorder     Restless leg syndrome, controlled     Seasonal allergies     Sinusitis     Sleep apnea     Sleep difficulties     Suicide and self-inflicted injury (HCC)     Tinnitus     TMJ dysfunction     Tonsillitis     Transcranial Magnetic Stimulation 09/06/2021    Urinary tract infection     Vision loss        Past Surgical History:   Procedure Laterality Date    DENTAL SURGERY      wisdom teeth-at [age] years. Other surgery dental extraxtion of bone spur (10 years ago)    IR LUMBAR PUNCTURE  02/26/2019    SINUS ENDOSCOPY      SINUS SURGERY      TONSILLECTOMY         Family History   Problem Relation Age of Onset    Mental illness Mother     Depression Mother     Suicide Attempts Mother     Hypertension Mother     Diabetes Mother     Other Mother         bicuspid aortic valve    Anxiety disorder Mother     Psychiatric Illness Mother     Schizoaffective Disorder  Mother     Anemia Mother     Hypertension Father     Hypothyroidism Father     Thyroid disease Father     Hypertension Brother     Cancer Maternal Grandmother     Heart disease Maternal Grandmother     Stroke Maternal Grandmother     Breast cancer Maternal Grandmother     Skin cancer Maternal Grandmother     Arthritis Maternal Grandmother     Pneumonia Maternal Grandfather     Cancer Maternal Grandfather     Dementia Maternal Grandfather     COPD Maternal Grandfather     Cancer Paternal Grandmother     Pneumonia Paternal Grandfather     Arthritis Family     Breast cancer Family     Osteopenia Family     Osteoporosis Family     Hypertension Family     Transient ischemic attack Family          Medications have been verified.        Objective   /81   Pulse 64   Temp 98.2 °F (36.8 °C)   Resp 18   SpO2 100%        Physical Exam     Physical Exam  Vitals and nursing note reviewed.   Constitutional:        Appearance: Normal appearance.   HENT:      Head: Normocephalic.   Eyes:      Pupils: Pupils are equal, round, and reactive to light.   Cardiovascular:      Rate and Rhythm: Normal rate and regular rhythm.      Heart sounds: Normal heart sounds. No murmur heard.  Pulmonary:      Effort: Pulmonary effort is normal. No respiratory distress.      Breath sounds: Normal breath sounds. No wheezing, rhonchi or rales.   Abdominal:      General: Bowel sounds are normal.      Palpations: Abdomen is soft.      Tenderness: There is no abdominal tenderness. There is no right CVA tenderness, left CVA tenderness, guarding or rebound.   Musculoskeletal:      Cervical back: Normal range of motion. No tenderness.   Neurological:      General: No focal deficit present.      Mental Status: She is alert and oriented to person, place, and time.      Sensory: No sensory deficit.      Motor: No weakness.      Coordination: Coordination normal.      Gait: Gait normal.      Deep Tendon Reflexes: Reflexes normal.

## 2024-04-28 NOTE — PSYCH
Progress Note  Psychotherapy Provided [facility]: Individual Psychotherapy 50 minutes provided today  Goals addressed in session:   1-3 D: Met with pt for Individual Therapy session  Elizabeth spoke today about her feelings re: not feeling able to volunteer without self doubt, deprecation and fear of vomiting this week  Struggles with her family were also processed at length  A: Triny Sanchez continues to be open and insightful as she works on her desire to develop a stronger sense of self and to challenge her beliefs about her family relationships  P: Upcoming sessions will be used to continue to address the above  She will continue to attend both weekly Trauma and ED group therapy sessions  Pain Scale and Suicide Risk  [facility]: Current Pain Assessment: moderate to severe   On a scale of 0 to 10, the patient rates current pain at 6   Behavioral Health Treatment Plan [address] [place] [address] [address]: Diagnosis and Treatment Plan explained to patient, patient relates understanding diagnosis and is agreeable to Treatment Plan  Assessment    1  Binge-eating disorder, severe (783 6) (F50 81)   2  Bipolar I disorder, most recent episode depressed, in full remission (296 56) (F31 76)   3  Gambling disorder, moderate (312 31) (F63 0)   4   Obsessive-compulsive disorder (300 3) (F42 9)    Signatures   Electronically signed by : Robi Serrano LCSW; Dec 15 2016  2:20PM EST                       (Author) RN

## 2024-04-28 NOTE — PATIENT INSTRUCTIONS
Follow up with PCP in 3-5 days.  Proceed to  ER if symptoms worsen.    If tests are performed, our office will contact you with results only if changes need to made to the care plan discussed with you at the visit. You can review your full results on [facility] Mychart.

## 2024-04-29 ENCOUNTER — OFFICE VISIT (OUTPATIENT)
Dept: FAMILY MEDICINE CLINIC | Facility: CLINIC | Age: 39
End: 2024-04-29
Payer: COMMERCIAL

## 2024-04-29 VITALS
OXYGEN SATURATION: 98 % | TEMPERATURE: 97.3 F | DIASTOLIC BLOOD PRESSURE: 70 MMHG | HEART RATE: 65 BPM | BODY MASS INDEX: 45.99 KG/M2 | SYSTOLIC BLOOD PRESSURE: 116 MMHG | HEIGHT: 67 IN | WEIGHT: 293 LBS

## 2024-04-29 DIAGNOSIS — N32.81 OVERACTIVE BLADDER: Primary | ICD-10-CM

## 2024-04-29 DIAGNOSIS — E03.9 ACQUIRED HYPOTHYROIDISM: ICD-10-CM

## 2024-04-29 LAB — BACTERIA UR CULT: ABNORMAL

## 2024-04-29 PROCEDURE — 3078F DIAST BP <80 MM HG: CPT | Performed by: FAMILY MEDICINE

## 2024-04-29 PROCEDURE — 3074F SYST BP LT 130 MM HG: CPT | Performed by: FAMILY MEDICINE

## 2024-04-29 PROCEDURE — 99214 OFFICE O/P EST MOD 30 MIN: CPT | Performed by: FAMILY MEDICINE

## 2024-04-29 PROCEDURE — G2211 COMPLEX E/M VISIT ADD ON: HCPCS | Performed by: FAMILY MEDICINE

## 2024-04-29 RX ORDER — LEVOTHYROXINE SODIUM 112 UG/1
112 TABLET ORAL
Qty: 90 TABLET | Refills: 1 | Status: SHIPPED | OUTPATIENT
Start: 2024-04-29

## 2024-04-29 RX ORDER — OXYBUTYNIN CHLORIDE 10 MG/1
10 TABLET, EXTENDED RELEASE ORAL
Qty: 30 TABLET | Refills: 0 | Status: SHIPPED | OUTPATIENT
Start: 2024-04-29

## 2024-04-29 NOTE — PROGRESS NOTES
"Assessment/Plan:    No problem-specific Assessment & Plan notes found for this encounter.       Diagnoses and all orders for this visit:    Overactive bladder  -Patient presents status post antibiotic therapy with Amoxil for GBS UTI.  She reports that dysuria has significantly improved but she continues to experience urgency/frequency  -After further discussion PCP counseled patient on trial of oxybutynin for likely underlying overactive bladder which she agrees to.  - oxybutynin (DITROPAN-XL) 10 MG 24 hr tablet; Take 1 tablet (10 mg total) by mouth daily at bedtime    Acquired hypothyroidism  -     levothyroxine 112 mcg tablet; Take 1 tablet (112 mcg total) by mouth daily in the early morning          Subjective:      Patient ID: Gita Rendon is a 38 y.o. female.    Urinary Frequency   This is a chronic problem. The current episode started more than 1 year ago. The problem has been gradually worsening. Associated symptoms include frequency and urgency. Pertinent negatives include no hematuria. Associated symptoms comments: Patient was found to have UTI and treated with Amoxil (last dose today). She has tried antibiotics for the symptoms. The treatment provided moderate relief.       The following portions of the patient's history were reviewed and updated as appropriate: allergies, current medications, past family history, past medical history, past social history, past surgical history, and problem list.    Review of Systems   Genitourinary:  Positive for frequency and urgency. Negative for hematuria.         Objective:      /70   Pulse 65   Temp (!) 97.3 °F (36.3 °C)   Ht 5' 7\" (1.702 m)   Wt (!) 151 kg (332 lb)   SpO2 98%   BMI 52.00 kg/m²          Physical Exam  Constitutional:       Appearance: Normal appearance.   HENT:      Right Ear: External ear normal.      Left Ear: External ear normal.   Cardiovascular:      Rate and Rhythm: Normal rate and regular rhythm.   Abdominal:      Tenderness: " There is no right CVA tenderness or left CVA tenderness.   Neurological:      Mental Status: She is alert and oriented to person, place, and time.

## 2024-04-30 ENCOUNTER — PATIENT MESSAGE (OUTPATIENT)
Dept: FAMILY MEDICINE CLINIC | Facility: CLINIC | Age: 39
End: 2024-04-30

## 2024-04-30 DIAGNOSIS — N32.81 OVERACTIVE BLADDER: Primary | ICD-10-CM

## 2024-05-02 ENCOUNTER — RA CDI HCC (OUTPATIENT)
Dept: OTHER | Facility: HOSPITAL | Age: 39
End: 2024-05-02

## 2024-05-03 ENCOUNTER — TELEPHONE (OUTPATIENT)
Dept: BEHAVIORAL/MENTAL HEALTH CLINIC | Facility: CLINIC | Age: 39
End: 2024-05-03

## 2024-05-06 ENCOUNTER — TELEPHONE (OUTPATIENT)
Dept: FAMILY MEDICINE CLINIC | Facility: CLINIC | Age: 39
End: 2024-05-06

## 2024-05-08 ENCOUNTER — APPOINTMENT (OUTPATIENT)
Dept: LAB | Facility: CLINIC | Age: 39
End: 2024-05-08
Payer: COMMERCIAL

## 2024-05-08 DIAGNOSIS — F33.2 MAJOR DEPRESSIVE DISORDER, RECURRENT SEVERE WITHOUT PSYCHOTIC FEATURES (HCC): ICD-10-CM

## 2024-05-08 DIAGNOSIS — Z51.81 ENCOUNTER FOR THERAPEUTIC DRUG LEVEL MONITORING: ICD-10-CM

## 2024-05-08 LAB — LITHIUM SERPL-SCNC: 0.57 MMOL/L (ref 0.6–1.2)

## 2024-05-08 PROCEDURE — 36415 COLL VENOUS BLD VENIPUNCTURE: CPT

## 2024-05-08 PROCEDURE — 80178 ASSAY OF LITHIUM: CPT

## 2024-05-09 DIAGNOSIS — E66.01 MORBID OBESITY (HCC): Primary | ICD-10-CM

## 2024-05-09 DIAGNOSIS — E66.01 CLASS 3 SEVERE OBESITY WITH BODY MASS INDEX (BMI) OF 60.0 TO 69.9 IN ADULT (HCC): ICD-10-CM

## 2024-05-13 ENCOUNTER — TELEPHONE (OUTPATIENT)
Age: 39
End: 2024-05-13

## 2024-05-17 ENCOUNTER — OFFICE VISIT (OUTPATIENT)
Dept: FAMILY MEDICINE CLINIC | Facility: CLINIC | Age: 39
End: 2024-05-17
Payer: COMMERCIAL

## 2024-05-17 VITALS
BODY MASS INDEX: 45.99 KG/M2 | DIASTOLIC BLOOD PRESSURE: 58 MMHG | OXYGEN SATURATION: 97 % | HEART RATE: 54 BPM | WEIGHT: 293 LBS | HEIGHT: 67 IN | TEMPERATURE: 97.4 F | SYSTOLIC BLOOD PRESSURE: 116 MMHG

## 2024-05-17 DIAGNOSIS — R10.12 LEFT UPPER QUADRANT ABDOMINAL PAIN: Primary | ICD-10-CM

## 2024-05-17 DIAGNOSIS — M54.9 COSTOVERTEBRAL ANGLE TENDERNESS: ICD-10-CM

## 2024-05-17 PROCEDURE — G2211 COMPLEX E/M VISIT ADD ON: HCPCS | Performed by: FAMILY MEDICINE

## 2024-05-17 PROCEDURE — 99214 OFFICE O/P EST MOD 30 MIN: CPT | Performed by: FAMILY MEDICINE

## 2024-05-17 RX ORDER — TRAZODONE HYDROCHLORIDE 100 MG/1
100 TABLET ORAL
COMMUNITY

## 2024-05-17 RX ORDER — ATOGEPANT 60 MG/1
60 TABLET ORAL DAILY
COMMUNITY

## 2024-05-17 RX ORDER — PROCHLORPERAZINE MALEATE 10 MG
10 TABLET ORAL EVERY 6 HOURS PRN
COMMUNITY

## 2024-05-17 RX ORDER — ALMOTRIPTAN 12.5 MG/1
12.5 TABLET, FILM COATED ORAL ONCE AS NEEDED
COMMUNITY

## 2024-05-17 RX ORDER — OXCARBAZEPINE 300 MG/1
300 TABLET, FILM COATED ORAL EVERY 12 HOURS SCHEDULED
COMMUNITY

## 2024-05-17 NOTE — PROGRESS NOTES
Ambulatory Visit  Name: Gita Rendon      : 1985      MRN: [id number]  Encounter Provider: Norma Traore MD  Encounter Date: 2024   Encounter department: [facility]    Assessment & Plan   1. Costovertebral angle tenderness    2. Abdominal Pain    - Patient presents to clinic with week long course of left upper quadrant abdominal pain that is constant, worsening, with radiation to the left lower back. Two days prior to onset of abdominal pain, patient reports that she had a fall on her left side while shopping. She says that her pain after the fall was primarily of her left lower extremity and denies abdominal or flank pain occurring immediately post fall. She denies any cuts or bruises post fall. On exam, patient had left sided CVA tenderness. Her abdomen was soft, nontender with normoactive bowel sounds. Skin exam did not show any evidence of trauma to the area such as bruising or laceration. Order placed for CT scan with renal protocol to rule out renal pathology.  Patient has had multiple point of care urinalysis within the last month and another UA was deferred at this time. The pain that the patient is experiencing can also be related to the fall that she had just two days prior to the onset of her pain. Patient to continue with follow-up to urology.        History of Present Illness     Gita Rendon is a 38 y.o. female presenting to clinic today with left upper quadrant abdominal pain that started roughly one week ago. She describes the pain as a dull, pressure like sensation that she says is constant. Pain does radiate around her left flank to her back. She says it is usually mild in the morning and worsens as the day goes on. She denies any nausea, vomiting, diarrhea, constipation, or blood in her urine/stool. Last BM was yesterday. She denies having pain when she urinates or passes BM. She says that she has frequency and urgency, but was recently  "prescribed oxybutin that has been helpful. Patient has not tried any medication to relieve her abdominal pain. Of note, patient stated that she had a fall on Monday May 6 in which she fell on her left side. She was at a clothing store looking at clothes when she tripped and fell over the rack.     Abdominal Pain  This is a recurrent problem. The current episode started 1 to 4 weeks ago. The onset quality is gradual. The problem occurs daily. The most recent episode lasted 10 days. The problem has been waxing and waning. The pain is located in the LUQ. The pain is at a severity of 4/10. The quality of the pain is aching and sharp. The abdominal pain radiates to the LUQ, periumbilical region and left flank. Associated symptoms include frequency. Pertinent negatives include no anorexia, arthralgias, belching, diarrhea, dysuria, fever, flatus, headaches, hematochezia, hematuria, melena, myalgias, nausea, vomiting or weight loss. Nothing aggravates the pain. The pain is relieved by Certain positions and recumbency. Prior diagnostic workup includes CT scan.       Review of Systems   Constitutional:  Negative for fever and weight loss.   Gastrointestinal:  Positive for abdominal pain. Negative for anorexia, diarrhea, flatus, hematochezia, melena, nausea and vomiting.   Genitourinary:  Positive for frequency. Negative for dysuria and hematuria.   Musculoskeletal:  Negative for arthralgias and myalgias.   Neurological:  Negative for headaches.       Objective     /58 (BP Location: Left arm)   Pulse (!) 54   Temp (!) 97.4 °F (36.3 °C) (Tympanic)   Ht 5' 7\" (1.702 m)   Wt (!) 154 kg (339 lb 9.6 oz)   SpO2 97%   BMI 53.19 kg/m²     Physical Exam  Constitutional:       Appearance: Normal appearance. She is obese.   HENT:      Head: Normocephalic.      Nose: Nose normal.      Mouth/Throat:      Mouth: Mucous membranes are moist.      Pharynx: Oropharynx is clear.   Eyes:      Extraocular Movements: Extraocular " movements intact.      Conjunctiva/sclera: Conjunctivae normal.      Pupils: Pupils are equal, round, and reactive to light.   Cardiovascular:      Rate and Rhythm: Normal rate and regular rhythm.      Pulses: Normal pulses.      Heart sounds: Normal heart sounds.   Pulmonary:      Effort: Pulmonary effort is normal.      Breath sounds: Normal breath sounds.   Abdominal:      General: Abdomen is flat. Bowel sounds are normal.      Palpations: Abdomen is soft.      Tenderness: There is left CVA tenderness.   Musculoskeletal:      Cervical back: Normal range of motion and neck supple.   Neurological:      Mental Status: She is alert.       Administrative Statements

## 2024-05-17 NOTE — TELEPHONE ENCOUNTER
PA for [employer]    Submitted via    [x]CMM-KEY  [id number]  []Surescripts-Case ID #   []Faxed to plan   []Other website   []Phone call Case ID #     Office notes sent, clinical questions answered. Awaiting determination    Turnaround time for your insurance to make a decision on your Prior Authorization can take 7-21 business days.

## 2024-05-21 DIAGNOSIS — N32.81 OVERACTIVE BLADDER: ICD-10-CM

## 2024-05-22 ENCOUNTER — NURSE TRIAGE (OUTPATIENT)
Age: 39
End: 2024-05-22

## 2024-05-22 ENCOUNTER — TELEPHONE (OUTPATIENT)
Age: 39
End: 2024-05-22

## 2024-05-22 ENCOUNTER — HOSPITAL ENCOUNTER (EMERGENCY)
Facility: HOSPITAL | Age: 39
Discharge: HOME | End: 2024-05-23
Attending: EMERGENCY MEDICINE
Payer: COMMERCIAL

## 2024-05-22 ENCOUNTER — APPOINTMENT (EMERGENCY)
Dept: CT IMAGING | Facility: HOSPITAL | Age: 39
End: 2024-05-22
Payer: COMMERCIAL

## 2024-05-22 DIAGNOSIS — R10.9 LEFT FLANK PAIN: ICD-10-CM

## 2024-05-22 DIAGNOSIS — R30.0 DYSURIA: ICD-10-CM

## 2024-05-22 DIAGNOSIS — R60.0 PERIPHERAL EDEMA: Primary | ICD-10-CM

## 2024-05-22 LAB
ALBUMIN SERPL BCP-MCNC: 4.1 G/DL (ref 3.5–5)
ALP SERPL-CCNC: 77 U/L (ref 34–104)
ALT SERPL W P-5'-P-CCNC: 15 U/L (ref 7–52)
ANION GAP SERPL CALCULATED.3IONS-SCNC: 9 MMOL/L (ref 4–13)
AST SERPL W P-5'-P-CCNC: 16 U/L (ref 13–39)
BACTERIA UR QL AUTO: NORMAL /HPF
BASOPHILS # BLD AUTO: 0.09 THOUSANDS/ÂΜL (ref 0–0.1)
BASOPHILS NFR BLD AUTO: 1 % (ref 0–1)
BILIRUB SERPL-MCNC: 0.36 MG/DL (ref 0.2–1)
BILIRUB UR QL STRIP: NEGATIVE
BNP SERPL-MCNC: 123 PG/ML (ref 0–100)
BUN SERPL-MCNC: 20 MG/DL (ref 5–25)
CALCIUM SERPL-MCNC: 9.6 MG/DL (ref 8.4–10.2)
CHLORIDE SERPL-SCNC: 109 MMOL/L (ref 96–108)
CLARITY UR: CLEAR
CO2 SERPL-SCNC: 18 MMOL/L (ref 21–32)
COLOR UR: YELLOW
CREAT SERPL-MCNC: 0.77 MG/DL (ref 0.6–1.3)
D DIMER PPP FEU-MCNC: 0.33 UG/ML FEU
EOSINOPHIL # BLD AUTO: 0.59 THOUSAND/ÂΜL (ref 0–0.61)
EOSINOPHIL NFR BLD AUTO: 4 % (ref 0–6)
ERYTHROCYTE [DISTWIDTH] IN BLOOD BY AUTOMATED COUNT: 13.8 % (ref 11.6–15.1)
EXT PREGNANCY TEST URINE: NEGATIVE
EXT. CONTROL: NORMAL
GFR SERPL CREATININE-BSD FRML MDRD: 97 ML/MIN/1.73SQ M
GLUCOSE SERPL-MCNC: 87 MG/DL (ref 65–140)
GLUCOSE UR STRIP-MCNC: NEGATIVE MG/DL
HCT VFR BLD AUTO: 41.9 % (ref 34.8–46.1)
HGB BLD-MCNC: 13.1 G/DL (ref 11.5–15.4)
HGB UR QL STRIP.AUTO: NEGATIVE
IMM GRANULOCYTES # BLD AUTO: 0.05 THOUSAND/UL (ref 0–0.2)
IMM GRANULOCYTES NFR BLD AUTO: 0 % (ref 0–2)
KETONES UR STRIP-MCNC: NEGATIVE MG/DL
LEUKOCYTE ESTERASE UR QL STRIP: ABNORMAL
LIPASE SERPL-CCNC: 18 U/L (ref 11–82)
LYMPHOCYTES # BLD AUTO: 3.17 THOUSANDS/ÂΜL (ref 0.6–4.47)
LYMPHOCYTES NFR BLD AUTO: 21 % (ref 14–44)
MAGNESIUM SERPL-MCNC: 2 MG/DL (ref 1.9–2.7)
MCH RBC QN AUTO: 28.5 PG (ref 26.8–34.3)
MCHC RBC AUTO-ENTMCNC: 31.3 G/DL (ref 31.4–37.4)
MCV RBC AUTO: 91 FL (ref 82–98)
MONOCYTES # BLD AUTO: 1.05 THOUSAND/ÂΜL (ref 0.17–1.22)
MONOCYTES NFR BLD AUTO: 7 % (ref 4–12)
NEUTROPHILS # BLD AUTO: 10.44 THOUSANDS/ÂΜL (ref 1.85–7.62)
NEUTS SEG NFR BLD AUTO: 67 % (ref 43–75)
NITRITE UR QL STRIP: NEGATIVE
NON-SQ EPI CELLS URNS QL MICRO: NORMAL /HPF
NRBC BLD AUTO-RTO: 0 /100 WBCS
PH UR STRIP.AUTO: 6 [PH]
PLATELET # BLD AUTO: 289 THOUSANDS/UL (ref 149–390)
PMV BLD AUTO: 10.3 FL (ref 8.9–12.7)
POTASSIUM SERPL-SCNC: 4.1 MMOL/L (ref 3.5–5.3)
PROT SERPL-MCNC: 6.5 G/DL (ref 6.4–8.4)
PROT UR STRIP-MCNC: NEGATIVE MG/DL
RBC # BLD AUTO: 4.59 MILLION/UL (ref 3.81–5.12)
RBC #/AREA URNS AUTO: NORMAL /HPF
SODIUM SERPL-SCNC: 136 MMOL/L (ref 135–147)
SP GR UR STRIP.AUTO: >=1.03 (ref 1–1.03)
TSH SERPL DL<=0.05 MIU/L-ACNC: 0.84 UIU/ML (ref 0.45–4.5)
UROBILINOGEN UR STRIP-ACNC: <2 MG/DL
WBC # BLD AUTO: 15.39 THOUSAND/UL (ref 4.31–10.16)
WBC #/AREA URNS AUTO: NORMAL /HPF

## 2024-05-22 PROCEDURE — 83735 ASSAY OF MAGNESIUM: CPT | Performed by: EMERGENCY MEDICINE

## 2024-05-22 PROCEDURE — 80053 COMPREHEN METABOLIC PANEL: CPT | Performed by: EMERGENCY MEDICINE

## 2024-05-22 PROCEDURE — 99284 EMERGENCY DEPT VISIT MOD MDM: CPT

## 2024-05-22 PROCEDURE — 85025 COMPLETE CBC W/AUTO DIFF WBC: CPT | Performed by: EMERGENCY MEDICINE

## 2024-05-22 PROCEDURE — 36415 COLL VENOUS BLD VENIPUNCTURE: CPT | Performed by: EMERGENCY MEDICINE

## 2024-05-22 PROCEDURE — 96375 TX/PRO/DX INJ NEW DRUG ADDON: CPT

## 2024-05-22 PROCEDURE — 81025 URINE PREGNANCY TEST: CPT | Performed by: EMERGENCY MEDICINE

## 2024-05-22 PROCEDURE — 96365 THER/PROPH/DIAG IV INF INIT: CPT

## 2024-05-22 PROCEDURE — 74177 CT ABD & PELVIS W/CONTRAST: CPT

## 2024-05-22 PROCEDURE — 83880 ASSAY OF NATRIURETIC PEPTIDE: CPT | Performed by: EMERGENCY MEDICINE

## 2024-05-22 PROCEDURE — 81001 URINALYSIS AUTO W/SCOPE: CPT | Performed by: PHYSICIAN ASSISTANT

## 2024-05-22 PROCEDURE — 83690 ASSAY OF LIPASE: CPT | Performed by: EMERGENCY MEDICINE

## 2024-05-22 PROCEDURE — 85379 FIBRIN DEGRADATION QUANT: CPT | Performed by: EMERGENCY MEDICINE

## 2024-05-22 PROCEDURE — 84443 ASSAY THYROID STIM HORMONE: CPT | Performed by: EMERGENCY MEDICINE

## 2024-05-22 RX ORDER — KETOROLAC TROMETHAMINE 30 MG/ML
15 INJECTION, SOLUTION INTRAMUSCULAR; INTRAVENOUS ONCE
Status: COMPLETED | OUTPATIENT
Start: 2024-05-22 | End: 2024-05-22

## 2024-05-22 RX ORDER — MAGNESIUM SULFATE HEPTAHYDRATE 40 MG/ML
2 INJECTION, SOLUTION INTRAVENOUS ONCE
Status: COMPLETED | OUTPATIENT
Start: 2024-05-22 | End: 2024-05-22

## 2024-05-22 RX ADMIN — KETOROLAC TROMETHAMINE 15 MG: 30 INJECTION, SOLUTION INTRAMUSCULAR at 20:59

## 2024-05-22 RX ADMIN — IOHEXOL 100 ML: 350 INJECTION, SOLUTION INTRAVENOUS at 22:57

## 2024-05-22 RX ADMIN — MAGNESIUM SULFATE HEPTAHYDRATE 2 G: 40 INJECTION, SOLUTION INTRAVENOUS at 21:04

## 2024-05-22 NOTE — TELEPHONE ENCOUNTER
I received a call from Radha, about patient. Phone call got disconnected.     I spoke with doctor and address her concern. If patient would like something for pain that doctor would send it over to pharmacy. If she feels like testing cannot wait then to get evaluated at ER.    I called patient and relayed information above. Patient will go to ER and call back if anything else is needed.

## 2024-05-22 NOTE — TELEPHONE ENCOUNTER
"   Reason for Disposition   Nursing judgment    Answer Assessment - Initial Assessment Questions  1. REASON FOR CALL or QUESTION: \"What is your reason for calling today?\" or \"How can I best help you?\" or \"What question do you have that I can help answer?\"          Pt states that while her pain has improved slightly, it is still present.  Pt's main concern is that she thinks she has a kidney stone and is having frequent urination plus increased fluid retention.      Pt states she gained 7-8lbs in one week and that she is urinating almost every 20min and has to get up 4-5x in a night.  Pt states she has to wear an overnight pad because sometimes she can't make it to the bathroom in time.  Wearing socks leaves marks on her feet/ankles.      Pt denies fever, SOB.  Denies any odor or dark color to urine.  Pt is scheduled for a CAT scan on Thursday as that is the earliest pt's schedule allowed.  Please review and advise recommendations on how pt should proceed.    Protocols used: Information Only Call - No Triage-ADULT-[place]    "

## 2024-05-22 NOTE — TELEPHONE ENCOUNTER
Regarding: frequent urination  ----- Message from Savannah HIDALGO sent at 5/22/2024  2:37 PM EDT -----  Was seen at the office last week having frequent urination has a CT schedule for Thursday also retaining fluid legs are swollen wearing compression socks can be called at [phone]

## 2024-05-23 VITALS
HEART RATE: 68 BPM | RESPIRATION RATE: 20 BRPM | TEMPERATURE: 98.2 F | SYSTOLIC BLOOD PRESSURE: 128 MMHG | OXYGEN SATURATION: 98 % | DIASTOLIC BLOOD PRESSURE: 58 MMHG

## 2024-05-23 PROCEDURE — 99285 EMERGENCY DEPT VISIT HI MDM: CPT | Performed by: EMERGENCY MEDICINE

## 2024-05-23 RX ORDER — CEPHALEXIN 250 MG/1
500 CAPSULE ORAL ONCE
Status: COMPLETED | OUTPATIENT
Start: 2024-05-23 | End: 2024-05-23

## 2024-05-23 RX ORDER — CEPHALEXIN 500 MG/1
500 CAPSULE ORAL EVERY 12 HOURS SCHEDULED
Qty: 10 CAPSULE | Refills: 0 | Status: SHIPPED | OUTPATIENT
Start: 2024-05-23 | End: 2024-05-28

## 2024-05-23 RX ORDER — FUROSEMIDE 10 MG/ML
20 INJECTION INTRAMUSCULAR; INTRAVENOUS ONCE
Status: DISCONTINUED | OUTPATIENT
Start: 2024-05-23 | End: 2024-05-23

## 2024-05-23 RX ADMIN — CEPHALEXIN 500 MG: 250 CAPSULE ORAL at 01:28

## 2024-05-23 NOTE — ED PROVIDER NOTES
History  Chief Complaint   Patient presents with    Flank Pain     Patient states that she fell 2 weeks ago and states her back hurt and thought it was associated to the fall. Was seen at pcp and they thought it was a kidney stone due to having right flank pain that wraps around to her abd. Patient now has UTI symptoms and states that her legs are more swollen than normal.      38-year-old female presents for evaluation of left-sided flank pain.  Patient states about 2 weeks ago she fell landing on her back and right side.  Patient more recently developed left-sided flank pain that is sharp in nature that wraps around to the front of her abdomen.  She denies fevers, nausea or vomiting.  Patient states some chronic constipation issues.  States increased urinary frequency, denies painful urination.  Patient additionally states lower extremity swelling.  She denies history of VTE, chest pain, dyspnea.        Prior to Admission Medications   Prescriptions Last Dose Informant Patient Reported? Taking?   Atogepant (Qulipta) 60 MG TABS  Self Yes No   Sig: Take 60 mg by mouth in the morning   Cequa 0.09 % SOLN  Self No No   Sig: Apply 1 drop to eye 2 (two) times a day   Erenumab-aooe (AIMOVIG, 140 MG DOSE, SC)  Self Yes No   Sig: Inject 140 mg under the skin every 28 days   Patient not taking: Reported on 5/17/2024   LORazepam (ATIVAN) 0.5 mg tablet  Self Yes No   Sig: Take 0.5 mg by mouth 2 (two) times a day prn   Patient not taking: Reported on 5/17/2024   Multiple Vitamin (MULTIVITAMIN) capsule  Self Yes No   Sig: Take 1 capsule by mouth daily   OXcarbazepine (TRILEPTAL) 300 mg tablet  Self Yes No   Sig: Take 300 mg by mouth every 12 (twelve) hours   Semaglutide-Weight Management (WEGOVY) 1.7 MG/0.75ML  Self No No   Sig: Inject 0.75 mL (1.7 mg total) under the skin once a week   acetaZOLAMIDE (DIAMOX) 250 mg tablet  Self No No   Sig: Take 2 tablets (500 mg total) by mouth 2 (two) times a day   almotriptan (AXERT) 12.5 MG  tablet  Self Yes No   Sig: Take 12.5 mg by mouth once as needed for migraine may repeat in 2 hours if needed.  Limited to 2 x a week   cariprazine (VRAYLAR) 6 MG capsule  Self No No   Sig: Take 1 capsule (6 mg total) by mouth daily   celecoxib (CeleBREX) 200 mg capsule  Self No No   Sig: Take 1 capsule (200 mg total) by mouth 2 (two) times a day   cholecalciferol 1,000 units tablet  Self No No   Sig: Take 5 tablets (5,000 Units total) by mouth daily   eletriptan (RELPAX) 40 MG tablet  Self Yes No   Sig: Take 40 mg by mouth once as needed for migraine may repeat in 2 hours if necessary   Patient not taking: Reported on 5/17/2024   escitalopram (LEXAPRO) 10 mg tablet  Self No No   Sig: Take 1 tablet (10 mg total) by mouth daily   Patient taking differently: Take 20 mg by mouth daily   furosemide (LASIX) 20 mg tablet  Self Yes No   Sig: Take 20 mg by mouth daily as needed   gabapentin (NEURONTIN) 300 mg capsule  Self No No   Sig: Take 1 capsule (300 mg total) by mouth 2 (two) times a day   Patient taking differently: Take 600 mg by mouth daily   gabapentin (NEURONTIN) 600 MG tablet  Self No No   Sig: Take 1.5 tablets (900 mg total) by mouth daily at bedtime   levothyroxine 112 mcg tablet  Self No No   Sig: Take 1 tablet (112 mcg total) by mouth daily in the early morning   linaCLOtide (Linzess) 290 MCG CAPS  Self No No   Sig: Take 1 capsule by mouth daily   lithium carbonate (LITHOBID) 450 mg CR tablet   No No   Sig: Take 1 tablet (450 mg total) by mouth 2 (two) times a day for 28 days   Patient taking differently: Take 450 mg by mouth 2 (two) times a day Patient is taking 1 tablet in the morning and 2 tablets in the evening.   loratadine (CLARITIN) 10 mg tablet  Self No No   Sig: Take 1 tablet (10 mg total) by mouth daily   Patient not taking: Reported on 5/17/2024   loteprednol etabonate (LOTEMAX) 0.5 % ophthalmic suspension  Self Yes No   metFORMIN (GLUCOPHAGE) 500 mg tablet  Self No No   Sig: Take 1 tablet (500 mg  total) by mouth daily with breakfast   omeprazole (PriLOSEC) 20 mg delayed release capsule  Self No No   Sig: Take 1 capsule (20 mg total) by mouth 2 (two) times a day   oxybutynin (DITROPAN-XL) 10 MG 24 hr tablet  Self No No   Sig: Take 1 tablet (10 mg total) by mouth daily at bedtime   phenazopyridine (PYRIDIUM) 100 mg tablet  Self No No   Sig: Take 1 tablet (100 mg total) by mouth 3 (three) times a day as needed for bladder spasms   Patient not taking: Reported on 5/17/2024   phenazopyridine (PYRIDIUM) 100 mg tablet  Self No No   Sig: Take 1 tablet (100 mg total) by mouth 3 (three) times a day as needed for bladder spasms   Patient not taking: Reported on 5/17/2024   prochlorperazine (COMPAZINE) 10 mg tablet  Self Yes No   Sig: Take 10 mg by mouth every 6 (six) hours as needed for nausea or vomiting Take 1 tablet by TID if needed for headache or nausea. Limit to 2x a week .   propranolol (INDERAL) 20 mg tablet  Self No No   Sig: Take 1 tablet (20 mg total) by mouth every 12 (twelve) hours   spironolactone (ALDACTONE) 25 mg tablet  Self No No   Sig: Take 1 tablet (25 mg total) by mouth daily   topiramate (TOPAMAX) 50 MG tablet  Self No No   Sig: Take 3 tablets (150 mg total) by mouth 2 (two) times a day   traZODone (DESYREL) 100 mg tablet  Self Yes No   Sig: Take 100 mg by mouth daily at bedtime      Facility-Administered Medications: None       Past Medical History:   Diagnosis Date    Allergic     Allergic rhinitis     Anorexia nervosa in remission     Anxiety     Arthritis 5/8/2014    Back pain     Bipolar disorder (HCC)     Depression     Dermatitis 10/16/2021    Disease of thyroid gland     Diverticulitis of colon 9/20/2015    Dizziness     Ear problems     Eating disorder     Esophageal reflux 08/15/2013    GERD (gastroesophageal reflux disease) 8/15/2013    Headache(784.0)     Headache, tension-type     Hypertension     Hypothyroid     Idiopathic intracranial hypertension     Lumbar degenerative disc  disease 05/08/2014    Migraine     Nasal congestion     Nosebleed     Obesity     Obsessive-compulsive disorder     Otitis media     Overactive bladder     Psychiatric disorder     Restless leg syndrome, controlled     Seasonal allergies     Sinusitis     Sleep apnea     Sleep difficulties     Suicide and self-inflicted injury (HCC)     Tinnitus     TMJ dysfunction     Tonsillitis     Transcranial Magnetic Stimulation 09/06/2021    Urinary tract infection     Vision loss        Past Surgical History:   Procedure Laterality Date    DENTAL SURGERY      wisdom teeth-at [age] years. Other surgery dental extraxtion of bone spur (10 years ago)    IR LUMBAR PUNCTURE  02/26/2019    SINUS ENDOSCOPY      SINUS SURGERY      TONSILLECTOMY         Family History   Problem Relation Age of Onset    Mental illness Mother     Depression Mother     Suicide Attempts Mother     Hypertension Mother     Diabetes Mother     Other Mother         bicuspid aortic valve    Anxiety disorder Mother     Psychiatric Illness Mother     Schizoaffective Disorder  Mother     Anemia Mother     Hypertension Father     Hypothyroidism Father     Thyroid disease Father     Hypertension Brother     Cancer Maternal Grandmother     Heart disease Maternal Grandmother     Stroke Maternal Grandmother     Breast cancer Maternal Grandmother     Skin cancer Maternal Grandmother     Arthritis Maternal Grandmother     Pneumonia Maternal Grandfather     Cancer Maternal Grandfather     Dementia Maternal Grandfather     COPD Maternal Grandfather     Cancer Paternal Grandmother     Pneumonia Paternal Grandfather     Arthritis Family     Breast cancer Family     Osteopenia Family     Osteoporosis Family     Hypertension Family     Transient ischemic attack Family      I have reviewed and agree with the history as documented.    E-Cigarette/Vaping    E-Cigarette Use Never User      E-Cigarette/Vaping Substances    Nicotine No     THC No     CBD No     Flavoring No      Other No     Unknown No      Social History     Tobacco Use    Smoking status: Never     Passive exposure: Never    Smokeless tobacco: Never   Vaping Use    Vaping status: Never Used   Substance Use Topics    Alcohol use: Never    Drug use: Never       Review of Systems   Constitutional:  Negative for activity change and appetite change.   Cardiovascular:  Negative for chest pain.   Gastrointestinal:  Positive for abdominal pain and constipation. Negative for diarrhea, nausea and vomiting.   Genitourinary:  Positive for frequency. Negative for dysuria.   All other systems reviewed and are negative.      Physical Exam  Physical Exam  Vitals reviewed.   Constitutional:       General: She is not in acute distress.     Appearance: Normal appearance. She is not toxic-appearing.   HENT:      Head: Normocephalic and atraumatic.      Right Ear: External ear normal.      Left Ear: External ear normal.   Eyes:      General: No scleral icterus.        Right eye: No discharge.         Left eye: No discharge.      Extraocular Movements: Extraocular movements intact.   Cardiovascular:      Rate and Rhythm: Normal rate and regular rhythm.      Comments: Pedal edema b/l  Pulmonary:      Effort: Pulmonary effort is normal. No respiratory distress.   Abdominal:      General: There is no distension.      Palpations: Abdomen is soft.      Tenderness: There is no abdominal tenderness. There is no guarding or rebound.   Musculoskeletal:         General: No deformity or signs of injury.   Skin:     General: Skin is warm.      Coloration: Skin is not jaundiced or pale.   Neurological:      General: No focal deficit present.      Mental Status: She is alert. Mental status is at baseline.         Vital Signs  ED Triage Vitals   Temperature Pulse Respirations Blood Pressure SpO2   05/22/24 2021 05/22/24 2021 05/22/24 2021 05/22/24 2021 05/22/24 2021   98.2 °F (36.8 °C) (!) 50 16 129/70 100 %      Temp Source Heart Rate Source Patient Position  - Orthostatic VS BP Location FiO2 (%)   05/22/24 2021 05/22/24 2021 05/22/24 2021 05/22/24 2021 --   Temporal Monitor Sitting Left arm       Pain Score       05/22/24 2059       4           Vitals:    05/22/24 2030 05/22/24 2200 05/23/24 0115 05/23/24 0129   BP: 127/69 134/63 128/58 128/58   Pulse: (!) 48 (!) 48 (!) 49 68   Patient Position - Orthostatic VS:   Lying Sitting         Visual Acuity      ED Medications  Medications   ketorolac (TORADOL) injection 15 mg (15 mg Intravenous Given 5/22/24 2059)   magnesium sulfate 2 g/50 mL IVPB (premix) 2 g (0 g Intravenous Stopped 5/22/24 2207)   iohexol (OMNIPAQUE) 350 MG/ML injection (MULTI-DOSE) 100 mL (100 mL Intravenous Given 5/22/24 2257)   cephalexin (KEFLEX) capsule 500 mg (500 mg Oral Given 5/23/24 0128)       Diagnostic Studies  Results Reviewed       Procedure Component Value Units Date/Time    Urine culture [[id number]]     Lab Status: No result Specimen: Urine     Comprehensive metabolic panel [[id number]]  (Abnormal) Collected: 05/22/24 2106    Lab Status: Final result Specimen: Blood from Arm, Right Updated: 05/22/24 2253     Sodium 136 mmol/L      Potassium 4.1 mmol/L      Chloride 109 mmol/L      CO2 18 mmol/L      ANION GAP 9 mmol/L      BUN 20 mg/dL      Creatinine 0.77 mg/dL      Glucose 87 mg/dL      Calcium 9.6 mg/dL      AST 16 U/L      ALT 15 U/L      Alkaline Phosphatase 77 U/L      Total Protein 6.5 g/dL      Albumin 4.1 g/dL      Total Bilirubin 0.36 mg/dL      eGFR 97 ml/min/1.73sq m     Narrative:      National Kid[employer] guidelines for Chronic Kidney Disease (CKD):     Stage 1 with normal or high GFR (GFR > 90 mL/min/1.73 square meters)    Stage 2 Mild CKD (GFR = 60-89 mL/min/1.73 square meters)    Stage 3A Moderate CKD (GFR = 45-59 mL/min/1.73 square meters)    Stage 3B Moderate CKD (GFR = 30-44 mL/min/1.73 square meters)    Stage 4 Severe CKD (GFR = 15-29 mL/min/1.73 square meters)    Stage 5 End Stage CKD (GFR <15 mL/min/1.73  square meters)  Note: GFR calculation is accurate only with a steady state creatinine    Lipase [[id number]]  (Normal) Collected: 05/22/24 2106    Lab Status: Final result Specimen: Blood from Arm, Right Updated: 05/22/24 2253     Lipase 18 u/L     Magnesium [[id number]]  (Normal) Collected: 05/22/24 2106    Lab Status: Final result Specimen: Blood from Arm, Right Updated: 05/22/24 2253     Magnesium 2.0 mg/dL     TSH, 3rd generation with Free T4 reflex [[id number]]  (Normal) Collected: 05/22/24 2106    Lab Status: Final result Specimen: Blood from Arm, Right Updated: 05/22/24 2146     TSH 3RD GENERATON 0.837 uIU/mL     B-Type Natriuretic Peptide(BNP) [[id number]]  (Abnormal) Collected: 05/22/24 2106    Lab Status: Final result Specimen: Blood from Arm, Right Updated: 05/22/24 2136      pg/mL     D-dimer, quantitative [[id number]]  (Normal) Collected: 05/22/24 2106    Lab Status: Final result Specimen: Blood from Arm, Right Updated: 05/22/24 2127     D-Dimer, Quant 0.33 ug/ml FEU     CBC and differential [[id number]]  (Abnormal) Collected: 05/22/24 2106    Lab Status: Final result Specimen: Blood from Arm, Right Updated: 05/22/24 2116     WBC 15.39 Thousand/uL      RBC 4.59 Million/uL      Hemoglobin 13.1 g/dL      Hematocrit 41.9 %      MCV 91 fL      MCH 28.5 pg      MCHC 31.3 g/dL      RDW 13.8 %      MPV 10.3 fL      Platelets 289 Thousands/uL      nRBC 0 /100 WBCs      Segmented % 67 %      Immature Grans % 0 %      Lymphocytes % 21 %      Monocytes % 7 %      Eosinophils Relative 4 %      Basophils Relative 1 %      Absolute Neutrophils 10.44 Thousands/µL      Absolute Immature Grans 0.05 Thousand/uL      Absolute Lymphocytes 3.17 Thousands/µL      Absolute Monocytes 1.05 Thousand/µL      Eosinophils Absolute 0.59 Thousand/µL      Basophils Absolute 0.09 Thousands/µL     POCT pregnancy, urine [[id number]]  (Normal) Resulted: 05/22/24 2107    Lab Status: Final result Updated: 05/22/24 2107     EXT Preg Test, Ur  Negative     Control Valid    Urine Microscopic [[id number]]  (Normal) Collected: 05/22/24 2021    Lab Status: Final result Specimen: Urine, Other Updated: 05/22/24 2106     RBC, UA None Seen /hpf      WBC, UA 0-1 /hpf      Epithelial Cells Occasional /hpf      Bacteria, UA None Seen /hpf     UA w Reflex to Microscopic w Reflex to Culture [[id number]]  (Abnormal) Collected: 05/22/24 2021    Lab Status: Final result Specimen: Urine, Other Updated: 05/22/24 2033     Color, UA Yellow     Clarity, UA Clear     Specific Gravity, UA >=1.030     pH, UA 6.0     Leukocytes, UA Trace     Nitrite, UA Negative     Protein, UA Negative mg/dl      Glucose, UA Negative mg/dl      Ketones, UA Negative mg/dl      Urobilinogen, UA <2.0 mg/dl      Bilirubin, UA Negative     Occult Blood, UA Negative                   CT abdomen pelvis with contrast   Final Result by Kat Irvin MD (05/23 0034)      No acute findings in the abdomen or pelvis.         Workstation performed: [id number]                    Procedures  Procedures         ED Course  ED Course as of 05/23/24 0149   Wed May 22, 2024   2131 D-Dimer, Quant: 0.33  negative   2234 TT sent to lab over unresulted labs   Thu May 23, 2024   0103 CT abdomen pelvis with contrast  IMPRESSION:     No acute findings in the abdomen or pelvis.                                 SBIRT 22yo+      Flowsheet Row Most Recent Value   Initial Alcohol Screen: US AUDIT-C     1. How often do you have a drink containing alcohol? 0 Filed at: 05/22/2024 2012   3a. Male UNDER 65: How often do you have five or more drinks on one occasion? 0 Filed at: 05/22/2024 2012   3b. FEMALE Any Age, or MALE 65+: How often do you have 4 or more drinks on one occassion? 0 Filed at: 05/22/2024 2012   Audit-C Score 0 Filed at: 05/22/2024 2012   JENNIFER: How many times in the past year have you...    Used an illegal drug or used a prescription medication for non-medical reasons? Never Filed at: 05/22/2024 2012                       Medical Decision Making  38-year-old female presents for evaluation of lower extremity swelling, left upper quadrant abdominal pain.  Swelling likely peripheral in nature, patient does have significant varicose veins on her legs, will obtain dimer assay to screen for VTE.  If this is positive we will dose with Lovenox and order outpatient duplex as we cannot obtain 1 at this time as it is after hours.  Will additionally assess for liver or renal dysfunction as a source, will check a BMP.  Patient states she is previously had diuretic prescription to use as needed, will provide patient with a dose in the emergency department.  Will check urinalysis given increased urinary frequency and urine pregnancy.  Will obtain CT study to assess for nephrolithiasis, pyelonephritis, other intra-abdominal etiology.    Amount and/or Complexity of Data Reviewed  Labs: ordered. Decision-making details documented in ED Course.  Radiology: ordered. Decision-making details documented in ED Course.    Risk  Prescription drug management.             Disposition  Final diagnoses:   Peripheral edema   Left flank pain   Dysuria     Time reflects when diagnosis was documented in both MDM as applicable and the Disposition within this note       Time User Action Codes Description Comment    5/23/2024  1:08 AM Serenity Quintero Add [R60.0] Peripheral edema     5/23/2024  1:08 AM Serenity Quintero Add [R10.9] Left flank pain     5/23/2024  1:24 AM Serenity Quintero Add [R30.0] Dysuria           ED Disposition       ED Disposition   Discharge    Condition   Stable    Date/Time   u May 23, 2024 0107    Comment   Gita Rendon discharge to home/self care.                   Follow-up Information       Follow up With Specialties Details Why Contact Info Additional Information    Norma Traore MD Family Medicine Schedule an appointment as soon as possible for a visit   [address] [place] [id number]  [phone]        Counts include 234 beds at the [facility] Emergency Department Emergency Medicine  If symptoms worsen [address] [facility] [address] [phone] Counts include 234 beds at the [facility] Emergency Department, [address] [place], [place], [address]            Discharge Medication List as of 5/23/2024  1:38 AM        START taking these medications    Details   cephalexin (KEFLEX) 500 mg capsule Take 1 capsule (500 mg total) by mouth every 12 (twelve) hours for 5 days, Starting Thu 5/23/2024, Until Tue 5/28/2024, Normal           CONTINUE these medications which have NOT CHANGED    Details   acetaZOLAMIDE (DIAMOX) 250 mg tablet Take 2 tablets (500 mg total) by mouth 2 (two) times a day, Starting Tue 4/16/2024, Normal      almotriptan (AXERT) 12.5 MG tablet Take 12.5 mg by mouth once as needed for migraine may repeat in 2 hours if needed.  Limited to 2 x a week, Historical Med      Atogepant (Qulipta) 60 MG TABS Take 60 mg by mouth in the morning, Historical Med      cariprazine (VRAYLAR) 6 MG capsule Take 1 capsule (6 mg total) by mouth daily, Starting Wed 4/17/2024, Until Fri 5/17/2024, Normal      celecoxib (CeleBREX) 200 mg capsule Take 1 capsule (200 mg total) by mouth 2 (two) times a day, Starting Tue 4/16/2024, Normal      Cequa 0.09 % SOLN Apply 1 drop to eye 2 (two) times a day, Starting Sat 8/5/2023, Normal      cholecalciferol 1,000 units tablet Take 5 tablets (5,000 Units total) by mouth daily, Starting Tue 4/16/2024, Normal      eletriptan (RELPAX) 40 MG tablet Take 40 mg by mouth once as needed for migraine may repeat in 2 hours if necessary, Historical Med      Erenumab-aooe (AIMOVIG, 140 MG DOSE, SC) Inject 140 mg under the skin every 28 days, Historical Med      escitalopram (LEXAPRO) 10 mg tablet Take 1 tablet (10 mg total) by mouth daily, Starting Wed 4/17/2024, Until Fri 5/17/2024, Normal      furosemide (LASIX) 20 mg tablet Take 20 mg by mouth daily as needed, Historical Med      gabapentin  (NEURONTIN) 300 mg capsule Take 1 capsule (300 mg total) by mouth 2 (two) times a day, Starting Tue 4/16/2024, Normal      gabapentin (NEURONTIN) 600 MG tablet Take 1.5 tablets (900 mg total) by mouth daily at bedtime, Starting Tue 4/16/2024, Normal      levothyroxine 112 mcg tablet Take 1 tablet (112 mcg total) by mouth daily in the early morning, Starting Mon 4/29/2024, Normal      linaCLOtide (Linzess) 290 MCG CAPS Take 1 capsule by mouth daily, Starting Wed 3/20/2024, Until Mon 9/16/2024, Normal      lithium carbonate (LITHOBID) 450 mg CR tablet Take 1 tablet (450 mg total) by mouth 2 (two) times a day for 28 days, Starting Tue 4/16/2024, Until Tue 5/14/2024, Normal      loratadine (CLARITIN) 10 mg tablet Take 1 tablet (10 mg total) by mouth daily, Starting Tue 4/16/2024, Normal      LORazepam (ATIVAN) 0.5 mg tablet Take 0.5 mg by mouth 2 (two) times a day prn, Historical Med      loteprednol etabonate (LOTEMAX) 0.5 % ophthalmic suspension Historical Med      metFORMIN (GLUCOPHAGE) 500 mg tablet Take 1 tablet (500 mg total) by mouth daily with breakfast, Starting Tue 4/16/2024, Normal      Multiple Vitamin (MULTIVITAMIN) capsule Take 1 capsule by mouth daily, Historical Med      omeprazole (PriLOSEC) 20 mg delayed release capsule Take 1 capsule (20 mg total) by mouth 2 (two) times a day, Starting Tue 4/16/2024, Normal      OXcarbazepine (TRILEPTAL) 300 mg tablet Take 300 mg by mouth every 12 (twelve) hours, Historical Med      oxybutynin (DITROPAN-XL) 10 MG 24 hr tablet Take 1 tablet (10 mg total) by mouth daily at bedtime, Starting Mon 4/29/2024, Normal      !! phenazopyridine (PYRIDIUM) 100 mg tablet Take 1 tablet (100 mg total) by mouth 3 (three) times a day as needed for bladder spasms, Starting Sun 4/21/2024, Normal      !! phenazopyridine (PYRIDIUM) 100 mg tablet Take 1 tablet (100 mg total) by mouth 3 (three) times a day as needed for bladder spasms, Starting Sun 4/28/2024, Normal      prochlorperazine  (COMPAZINE) 10 mg tablet Take 10 mg by mouth every 6 (six) hours as needed for nausea or vomiting Take 1 tablet by TID if needed for headache or nausea. Limit to 2x a week ., Historical Med      propranolol (INDERAL) 20 mg tablet Take 1 tablet (20 mg total) by mouth every 12 (twelve) hours, Starting Tue 4/16/2024, Normal      Semaglutide-Weight Management (WEGOVY) 1.7 MG/0.75ML Inject 0.75 mL (1.7 mg total) under the skin once a week, Starting u 5/9/2024, Normal      spironolactone (ALDACTONE) 25 mg tablet Take 1 tablet (25 mg total) by mouth daily, Starting Tue 4/16/2024, Normal      topiramate (TOPAMAX) 50 MG tablet Take 3 tablets (150 mg total) by mouth 2 (two) times a day, Starting Tue 4/16/2024, Normal      traZODone (DESYREL) 100 mg tablet Take 100 mg by mouth daily at bedtime, Historical Med       !! - Potential duplicate medications found. Please discuss with provider.          No discharge procedures on file.    PDMP Review         Value Time User    PDMP Reviewed  Yes 4/16/2024  2:32 PM LEIGHA Kaiser            ED Provider  Electronically Signed by             Serenity Quintero DO  05/23/24 0149

## 2024-05-24 ENCOUNTER — TELEPHONE (OUTPATIENT)
Age: 39
End: 2024-05-24

## 2024-05-24 RX ORDER — OXYBUTYNIN CHLORIDE 10 MG/1
10 TABLET, EXTENDED RELEASE ORAL
Qty: 30 TABLET | Refills: 1 | Status: SHIPPED | OUTPATIENT
Start: 2024-05-24 | End: 2024-05-30 | Stop reason: DRUGHIGH

## 2024-05-24 NOTE — TELEPHONE ENCOUNTER
Patient called, to schedule appt for today.    Pt as just in the office 10 days ago, and had been seen in the ER on 5/23. Pt had kidney pain, and frequency CT scan showed no kidney stones,  was given antibiotics for UTI. Has appt with urology.    Nothing available for appt today.   Please reach out to patient if something becomes available    Please advise. Thank you

## 2024-05-25 ENCOUNTER — NURSE TRIAGE (OUTPATIENT)
Dept: OTHER | Facility: OTHER | Age: 39
End: 2024-05-25

## 2024-05-25 ENCOUNTER — HOSPITAL ENCOUNTER (EMERGENCY)
Facility: HOSPITAL | Age: 39
Discharge: HOME | End: 2024-05-26
Attending: EMERGENCY MEDICINE
Payer: COMMERCIAL

## 2024-05-25 DIAGNOSIS — R35.0 URINARY FREQUENCY: ICD-10-CM
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
## 2024-05-25 DIAGNOSIS — R53.83 MALAISE AND FATIGUE: Primary | ICD-10-CM

## 2024-05-25 DIAGNOSIS — R53.81 MALAISE AND FATIGUE: Primary | ICD-10-CM

## 2024-05-25 LAB
ALBUMIN SERPL BCP-MCNC: 4.2 G/DL (ref 3.5–5)
ALP SERPL-CCNC: 72 U/L (ref 34–104)
ALT SERPL W P-5'-P-CCNC: 16 U/L (ref 7–52)
ANION GAP SERPL CALCULATED.3IONS-SCNC: 9 MMOL/L (ref 4–13)
AST SERPL W P-5'-P-CCNC: 13 U/L (ref 13–39)
ATRIAL RATE: 46 BPM
BACTERIA UR QL AUTO: ABNORMAL /HPF
BASOPHILS # BLD AUTO: 0.1 THOUSANDS/ÂΜL (ref 0–0.1)
BASOPHILS NFR BLD AUTO: 1 % (ref 0–1)
BILIRUB SERPL-MCNC: 0.25 MG/DL (ref 0.2–1)
BILIRUB UR QL STRIP: NEGATIVE
BUN SERPL-MCNC: 26 MG/DL (ref 5–25)
CALCIUM SERPL-MCNC: 9.8 MG/DL (ref 8.4–10.2)
CARDIAC TROPONIN I PNL SERPL HS: 3 NG/L (ref 8–18)
CHLORIDE SERPL-SCNC: 110 MMOL/L (ref 96–108)
CLARITY UR: CLEAR
CO2 SERPL-SCNC: 19 MMOL/L (ref 21–32)
COLOR UR: YELLOW
CREAT SERPL-MCNC: 0.91 MG/DL (ref 0.6–1.3)
EOSINOPHIL # BLD AUTO: 0.61 THOUSAND/ÂΜL (ref 0–0.61)
EOSINOPHIL NFR BLD AUTO: 4 % (ref 0–6)
ERYTHROCYTE [DISTWIDTH] IN BLOOD BY AUTOMATED COUNT: 13.5 % (ref 11.6–15.1)
EXT PREGNANCY TEST URINE: NEGATIVE
EXT. CONTROL: NORMAL
GFR SERPL CREATININE-BSD FRML MDRD: 79 ML/MIN/1.73SQ M
GLUCOSE SERPL-MCNC: 99 MG/DL (ref 65–140)
GLUCOSE UR STRIP-MCNC: NEGATIVE MG/DL
HCT VFR BLD AUTO: 43.6 % (ref 34.8–46.1)
HGB BLD-MCNC: 13.7 G/DL (ref 11.5–15.4)
HGB UR QL STRIP.AUTO: NEGATIVE
IMM GRANULOCYTES # BLD AUTO: 0.07 THOUSAND/UL (ref 0–0.2)
IMM GRANULOCYTES NFR BLD AUTO: 0 % (ref 0–2)
KETONES UR STRIP-MCNC: NEGATIVE MG/DL
LEUKOCYTE ESTERASE UR QL STRIP: NEGATIVE
LIPASE SERPL-CCNC: 19 U/L (ref 11–82)
LYMPHOCYTES # BLD AUTO: 3.1 THOUSANDS/ÂΜL (ref 0.6–4.47)
LYMPHOCYTES NFR BLD AUTO: 18 % (ref 14–44)
MAGNESIUM SERPL-MCNC: 2 MG/DL (ref 1.9–2.7)
MCH RBC QN AUTO: 28.4 PG (ref 26.8–34.3)
MCHC RBC AUTO-ENTMCNC: 31.4 G/DL (ref 31.4–37.4)
MCV RBC AUTO: 90 FL (ref 82–98)
MONOCYTES # BLD AUTO: 1.12 THOUSAND/ÂΜL (ref 0.17–1.22)
MONOCYTES NFR BLD AUTO: 7 % (ref 4–12)
NEUTROPHILS # BLD AUTO: 11.87 THOUSANDS/ÂΜL (ref 1.85–7.62)
NEUTS SEG NFR BLD AUTO: 70 % (ref 43–75)
NITRITE UR QL STRIP: NEGATIVE
NON-SQ EPI CELLS URNS QL MICRO: ABNORMAL /HPF
NRBC BLD AUTO-RTO: 0 /100 WBCS
P AXIS: 31 DEGREES
PH UR STRIP.AUTO: 6 [PH]
PLATELET # BLD AUTO: 293 THOUSANDS/UL (ref 149–390)
PMV BLD AUTO: 10.8 FL (ref 8.9–12.7)
POTASSIUM SERPL-SCNC: 3.6 MMOL/L (ref 3.5–5.3)
PR INTERVAL: 180 MS
PROT SERPL-MCNC: 6.8 G/DL (ref 6.4–8.4)
PROT UR STRIP-MCNC: ABNORMAL MG/DL
QRS AXIS: 31 DEGREES
QRSD INTERVAL: 90 MS
QT INTERVAL: 438 MS
QTC INTERVAL: 383 MS
RBC # BLD AUTO: 4.83 MILLION/UL (ref 3.81–5.12)
RBC #/AREA URNS AUTO: ABNORMAL /HPF
SODIUM SERPL-SCNC: 138 MMOL/L (ref 135–147)
SP GR UR STRIP.AUTO: >=1.03 (ref 1–1.03)
T WAVE AXIS: 43 DEGREES
TSH SERPL DL<=0.05 MIU/L-ACNC: 1.78 UIU/ML (ref 0.45–4.5)
UROBILINOGEN UR STRIP-ACNC: 2 MG/DL
VENTRICULAR RATE: 46 BPM
WBC # BLD AUTO: 16.87 THOUSAND/UL (ref 4.31–10.16)
WBC #/AREA URNS AUTO: ABNORMAL /HPF

## 2024-05-25 PROCEDURE — 36415 COLL VENOUS BLD VENIPUNCTURE: CPT | Performed by: EMERGENCY MEDICINE

## 2024-05-25 PROCEDURE — 85025 COMPLETE CBC W/AUTO DIFF WBC: CPT | Performed by: EMERGENCY MEDICINE

## 2024-05-25 PROCEDURE — 84484 ASSAY OF TROPONIN QUANT: CPT | Performed by: EMERGENCY MEDICINE

## 2024-05-25 PROCEDURE — 86618 LYME DISEASE ANTIBODY: CPT | Performed by: EMERGENCY MEDICINE

## 2024-05-25 PROCEDURE — 81025 URINE PREGNANCY TEST: CPT | Performed by: EMERGENCY MEDICINE

## 2024-05-25 PROCEDURE — 84443 ASSAY THYROID STIM HORMONE: CPT | Performed by: EMERGENCY MEDICINE

## 2024-05-25 PROCEDURE — 83690 ASSAY OF LIPASE: CPT | Performed by: EMERGENCY MEDICINE

## 2024-05-25 PROCEDURE — 83735 ASSAY OF MAGNESIUM: CPT | Performed by: EMERGENCY MEDICINE

## 2024-05-25 PROCEDURE — 80053 COMPREHEN METABOLIC PANEL: CPT | Performed by: EMERGENCY MEDICINE

## 2024-05-25 PROCEDURE — 81001 URINALYSIS AUTO W/SCOPE: CPT | Performed by: EMERGENCY MEDICINE

## 2024-05-25 PROCEDURE — 87491 CHLMYD TRACH DNA AMP PROBE: CPT | Performed by: EMERGENCY MEDICINE

## 2024-05-25 PROCEDURE — 99285 EMERGENCY DEPT VISIT HI MDM: CPT | Performed by: EMERGENCY MEDICINE

## 2024-05-25 PROCEDURE — 99283 EMERGENCY DEPT VISIT LOW MDM: CPT

## 2024-05-25 PROCEDURE — 87591 N.GONORRHOEAE DNA AMP PROB: CPT | Performed by: EMERGENCY MEDICINE

## 2024-05-25 PROCEDURE — 93005 ELECTROCARDIOGRAM TRACING: CPT

## 2024-05-25 NOTE — TELEPHONE ENCOUNTER
"Regarding: urinating frequently/headache/heavy body  ----- Message from Odalys RUIZ sent at 5/25/2024  5:29 PM EDT -----  Pt states \"I have been urinating frequently and I feel like Vianney been dragging myself to walk. I also have had a headache for 2 days\"    "

## 2024-05-25 NOTE — TELEPHONE ENCOUNTER
"Reason for Disposition  • [1] Taking antibiotics > 24 hours AND [2] symptoms WORSE    Answer Assessment - Initial Assessment Questions  1. INFECTION: \"What infection is the antibiotic being given for?\"      UTI   2. ANTIBIOTIC: \"What antibiotic are you taking\" \"How many times per day?\"      Keflex 500mg BID    3. DURATION: \"When was the antibiotic started?\"      Started taking 5/22    4. MAIN CONCERN OR SYMPTOM:  \"What is your main concern right now?\"      Urinary frequency and urgency; pressure at the end of urinating    5. BETTER-SAME-WORSE: \"Are you getting better, staying the same, or getting worse compared to when you first started the antibiotics?\" If getting worse, ask: \"In what way?\"       Worse, p  6. FEVER: \"Do you have a fever?\" If Yes, ask: \"What is your temperature, how was it measured, and when did it start?\"      Denies  7. SYMPTOMS: \"Are there any other symptoms you're concerned about?\" If Yes, ask: \"When did it start?\"      headache, chills, feeling overall terrible; feels like she has no energy    8. FOLLOW-UP APPOINTMENT: \"Do you have a follow-up appointment with your doctor?\"      Denies    Protocols used: Infection on Antibiotic Follow-up Call-ADULT-    "

## 2024-05-26 VITALS
RESPIRATION RATE: 19 BRPM | HEART RATE: 51 BPM | BODY MASS INDEX: 53.35 KG/M2 | SYSTOLIC BLOOD PRESSURE: 142 MMHG | WEIGHT: 293 LBS | TEMPERATURE: 98.7 F | OXYGEN SATURATION: 96 % | DIASTOLIC BLOOD PRESSURE: 61 MMHG

## 2024-05-26 RX ORDER — AMOXICILLIN 500 MG/1
500 CAPSULE ORAL 3 TIMES DAILY
Qty: 21 CAPSULE | Refills: 0 | Status: SHIPPED | OUTPATIENT
Start: 2024-05-26 | End: 2024-06-16

## 2024-05-26 RX ORDER — AMOXICILLIN 250 MG/1
500 CAPSULE ORAL ONCE
Status: COMPLETED | OUTPATIENT
Start: 2024-05-26 | End: 2024-05-26

## 2024-05-26 RX ADMIN — AMOXICILLIN 500 MG: 250 CAPSULE ORAL at 00:28

## 2024-05-26 NOTE — ED PROVIDER NOTES
History  Chief Complaint   Patient presents with    Medical Problem     UTI symptoms, feeling lousy     37 yo female Struve hypertension OCD hypothyroidism generalized anxiety disorder PTSD been binge eating low back pain migraines cranial hypertension constipation and diverticulosis presents with ongoing plaints of urinary frequency and urgency but not dysuria.  She is also been feeling tired and very fatigued.  She denies any fevers but has felt cold she has been lightheaded she did have a fall 3 weeks ago landing on her right side she was evaluated by her primary care doctor and she was seen in the emergency department for concerns of kidney stones because she was having flank pain started on a 5-day cephalexin due to urinary complaints.  She had a CT abdomen pelvis which was negative her D-dimer was negative she was noted to have some peripheral edema.  Patient states she did have a tick bite within the last week she pulled it off there is no rash around that she denies any specific joint pain she has been intermittently nauseated but no vomiting left lower quadrant pain is improved it seems to be slightly lower she had no vaginal bleeding or discharge has been constipated there is no history of bowel incontinence or back pain is at baseline.  Denies any chest pain she has been occasionally short of breath lower ext edema is improved  Past surgical history is sinus surgery IR lumbar puncture in 2019 and tonsillectomy        Prior to Admission Medications   Prescriptions Last Dose Informant Patient Reported? Taking?   Atogepant (Qulipta) 60 MG TABS  Self Yes No   Sig: Take 60 mg by mouth in the morning   Cequa 0.09 % SOLN  Self No No   Sig: Apply 1 drop to eye 2 (two) times a day   Erenumab-aooe (AIMOVIG, 140 MG DOSE, SC)  Self Yes No   Sig: Inject 140 mg under the skin every 28 days   Patient not taking: Reported on 5/17/2024   LORazepam (ATIVAN) 0.5 mg tablet  Self Yes No   Sig: Take 0.5 mg by mouth 2 (two)  times a day prn   Patient not taking: Reported on 5/17/2024   Multiple Vitamin (MULTIVITAMIN) capsule  Self Yes No   Sig: Take 1 capsule by mouth daily   OXcarbazepine (TRILEPTAL) 300 mg tablet  Self Yes No   Sig: Take 300 mg by mouth every 12 (twelve) hours   Semaglutide-Weight Management (WEGOVY) 1.7 MG/0.75ML  Self No No   Sig: Inject 0.75 mL (1.7 mg total) under the skin once a week   acetaZOLAMIDE (DIAMOX) 250 mg tablet  Self No No   Sig: Take 2 tablets (500 mg total) by mouth 2 (two) times a day   almotriptan (AXERT) 12.5 MG tablet  Self Yes No   Sig: Take 12.5 mg by mouth once as needed for migraine may repeat in 2 hours if needed.  Limited to 2 x a week   cariprazine (VRAYLAR) 6 MG capsule  Self No No   Sig: Take 1 capsule (6 mg total) by mouth daily   celecoxib (CeleBREX) 200 mg capsule  Self No No   Sig: Take 1 capsule (200 mg total) by mouth 2 (two) times a day   cephalexin (KEFLEX) 500 mg capsule   No No   Sig: Take 1 capsule (500 mg total) by mouth every 12 (twelve) hours for 5 days   cholecalciferol 1,000 units tablet  Self No No   Sig: Take 5 tablets (5,000 Units total) by mouth daily   eletriptan (RELPAX) 40 MG tablet  Self Yes No   Sig: Take 40 mg by mouth once as needed for migraine may repeat in 2 hours if necessary   Patient not taking: Reported on 5/17/2024   escitalopram (LEXAPRO) 10 mg tablet  Self No No   Sig: Take 1 tablet (10 mg total) by mouth daily   Patient taking differently: Take 20 mg by mouth daily   furosemide (LASIX) 20 mg tablet  Self Yes No   Sig: Take 20 mg by mouth daily as needed   gabapentin (NEURONTIN) 300 mg capsule  Self No No   Sig: Take 1 capsule (300 mg total) by mouth 2 (two) times a day   Patient taking differently: Take 600 mg by mouth daily   gabapentin (NEURONTIN) 600 MG tablet  Self No No   Sig: Take 1.5 tablets (900 mg total) by mouth daily at bedtime   levothyroxine 112 mcg tablet  Self No No   Sig: Take 1 tablet (112 mcg total) by mouth daily in the early  morning   linaCLOtide (Linzess) 290 MCG CAPS  Self No No   Sig: Take 1 capsule by mouth daily   lithium carbonate (LITHOBID) 450 mg CR tablet   No No   Sig: Take 1 tablet (450 mg total) by mouth 2 (two) times a day for 28 days   Patient taking differently: Take 450 mg by mouth 2 (two) times a day Patient is taking 1 tablet in the morning and 2 tablets in the evening.   loratadine (CLARITIN) 10 mg tablet  Self No No   Sig: Take 1 tablet (10 mg total) by mouth daily   Patient not taking: Reported on 5/17/2024   loteprednol etabonate (LOTEMAX) 0.5 % ophthalmic suspension  Self Yes No   metFORMIN (GLUCOPHAGE) 500 mg tablet  Self No No   Sig: Take 1 tablet (500 mg total) by mouth daily with breakfast   omeprazole (PriLOSEC) 20 mg delayed release capsule  Self No No   Sig: Take 1 capsule (20 mg total) by mouth 2 (two) times a day   oxybutynin (DITROPAN-XL) 10 MG 24 hr tablet   No No   Sig: Take 1 tablet (10 mg total) by mouth daily at bedtime   phenazopyridine (PYRIDIUM) 100 mg tablet  Self No No   Sig: Take 1 tablet (100 mg total) by mouth 3 (three) times a day as needed for bladder spasms   Patient not taking: Reported on 5/17/2024   phenazopyridine (PYRIDIUM) 100 mg tablet  Self No No   Sig: Take 1 tablet (100 mg total) by mouth 3 (three) times a day as needed for bladder spasms   Patient not taking: Reported on 5/17/2024   prochlorperazine (COMPAZINE) 10 mg tablet  Self Yes No   Sig: Take 10 mg by mouth every 6 (six) hours as needed for nausea or vomiting Take 1 tablet by TID if needed for headache or nausea. Limit to 2x a week .   propranolol (INDERAL) 20 mg tablet  Self No No   Sig: Take 1 tablet (20 mg total) by mouth every 12 (twelve) hours   spironolactone (ALDACTONE) 25 mg tablet  Self No No   Sig: Take 1 tablet (25 mg total) by mouth daily   topiramate (TOPAMAX) 50 MG tablet  Self No No   Sig: Take 3 tablets (150 mg total) by mouth 2 (two) times a day   traZODone (DESYREL) 100 mg tablet  Self Yes No   Sig: Take  100 mg by mouth daily at bedtime      Facility-Administered Medications: None       Past Medical History:   Diagnosis Date    Allergic     Allergic rhinitis     Anorexia nervosa in remission     Anxiety     Arthritis 5/8/2014    Back pain     Bipolar disorder (HCC)     Depression     Dermatitis 10/16/2021    Disease of thyroid gland     Diverticulitis of colon 9/20/2015    Dizziness     Ear problems     Eating disorder     Esophageal reflux 08/15/2013    GERD (gastroesophageal reflux disease) 8/15/2013    Headache(784.0)     Headache, tension-type     Hypertension     Hypothyroid     Idiopathic intracranial hypertension     Lumbar degenerative disc disease 05/08/2014    Migraine     Nasal congestion     Nosebleed     Obesity     Obsessive-compulsive disorder     Otitis media     Overactive bladder     Psychiatric disorder     Restless leg syndrome, controlled     Seasonal allergies     Sinusitis     Sleep apnea     Sleep difficulties     Suicide and self-inflicted injury (HCC)     Tinnitus     TMJ dysfunction     Tonsillitis     Transcranial Magnetic Stimulation 09/06/2021    Urinary tract infection     Vision loss        Past Surgical History:   Procedure Laterality Date    DENTAL SURGERY      wisdom teeth-at [age] years. Other surgery dental extraxtion of bone spur (10 years ago)    IR LUMBAR PUNCTURE  02/26/2019    SINUS ENDOSCOPY      SINUS SURGERY      TONSILLECTOMY         Family History   Problem Relation Age of Onset    Mental illness Mother     Depression Mother     Suicide Attempts Mother     Hypertension Mother     Diabetes Mother     Other Mother         bicuspid aortic valve    Anxiety disorder Mother     Psychiatric Illness Mother     Schizoaffective Disorder  Mother     Anemia Mother     Hypertension Father     Hypothyroidism Father     Thyroid disease Father     Hypertension Brother     Cancer Maternal Grandmother     Heart disease Maternal Grandmother     Stroke Maternal Grandmother     Breast  cancer Maternal Grandmother     Skin cancer Maternal Grandmother     Arthritis Maternal Grandmother     Pneumonia Maternal Grandfather     Cancer Maternal Grandfather     Dementia Maternal Grandfather     COPD Maternal Grandfather     Cancer Paternal Grandmother     Pneumonia Paternal Grandfather     Arthritis Family     Breast cancer Family     Osteopenia Family     Osteoporosis Family     Hypertension Family     Transient ischemic attack Family      I have reviewed and agree with the history as documented.    E-Cigarette/Vaping    E-Cigarette Use Never User      E-Cigarette/Vaping Substances    Nicotine No     THC No     CBD No     Flavoring No     Other No     Unknown No      Social History     Tobacco Use    Smoking status: Never     Passive exposure: Never    Smokeless tobacco: Never   Vaping Use    Vaping status: Never Used   Substance Use Topics    Alcohol use: Never    Drug use: Never       Review of Systems   Constitutional:  Positive for activity change, chills and fatigue. Negative for appetite change and fever.   HENT:  Negative for congestion, ear pain, rhinorrhea, sneezing and sore throat.    Eyes:  Negative for discharge.   Respiratory:  Negative for cough and shortness of breath.    Cardiovascular:  Negative for chest pain and leg swelling.   Gastrointestinal:  Positive for abdominal pain (LLQ) and nausea. Negative for blood in stool, diarrhea and vomiting.   Endocrine: Negative for polyuria.   Genitourinary:  Positive for frequency and urgency. Negative for decreased urine volume, difficulty urinating, dysuria, vaginal bleeding and vaginal discharge.   Musculoskeletal:  Positive for back pain (chronic). Negative for arthralgias, myalgias, neck pain and neck stiffness.   Skin:  Negative for rash.   Neurological:  Positive for light-headedness and headaches (intermittnat). Negative for dizziness, weakness and numbness.   Hematological:  Negative for adenopathy.   Psychiatric/Behavioral:  Negative for  confusion.    All other systems reviewed and are negative.      Physical Exam  Physical Exam  Vitals and nursing note reviewed.   Constitutional:       General: She is not in acute distress.     Appearance: She is not ill-appearing, toxic-appearing or diaphoretic.      Comments: Texting on her phone   HENT:      Head: Normocephalic.      Right Ear: Tympanic membrane and external ear normal.      Left Ear: Tympanic membrane and external ear normal.      Nose: Nose normal. No congestion or rhinorrhea.      Mouth/Throat:      Mouth: Mucous membranes are moist.      Pharynx: No oropharyngeal exudate or posterior oropharyngeal erythema.   Eyes:      General:         Right eye: No discharge.         Left eye: No discharge.      Extraocular Movements: Extraocular movements intact.      Conjunctiva/sclera: Conjunctivae normal.      Pupils: Pupils are equal, round, and reactive to light.   Cardiovascular:      Rate and Rhythm: Normal rate and regular rhythm.      Pulses: Normal pulses.   Pulmonary:      Effort: No respiratory distress.      Breath sounds: Normal breath sounds. No stridor. No wheezing, rhonchi or rales.   Abdominal:      General: Bowel sounds are normal. There is no distension.      Tenderness: There is no abdominal tenderness. There is no right CVA tenderness, left CVA tenderness, guarding or rebound.   Genitourinary:     Comments: Patient declines  Musculoskeletal:      Cervical back: Normal range of motion and neck supple. No tenderness.      Right lower leg: No edema.      Left lower leg: No edema.   Neurological:      Mental Status: She is alert.      Cranial Nerves: No cranial nerve deficit.      Sensory: No sensory deficit.      Motor: No weakness.      Coordination: Coordination normal.      Gait: Gait normal.   Psychiatric:         Mood and Affect: Mood normal.         Vital Signs  ED Triage Vitals   Temperature Pulse Respirations Blood Pressure SpO2   05/25/24 2021 05/25/24 2021 05/25/24 2021  05/25/24 2021 05/25/24 2027   98.7 °F (37.1 °C) (!) 53 19 132/63 99 %      Temp Source Heart Rate Source Patient Position - Orthostatic VS BP Location FiO2 (%)   05/25/24 2021 05/25/24 2100 05/25/24 2021 05/25/24 2021 --   Temporal Monitor Lying Right arm       Pain Score       --                  Vitals:    05/25/24 2021 05/25/24 2100 05/25/24 2200 05/26/24 0000   BP: 132/63 132/64 137/63 142/61   Pulse: (!) 53 (!) 47 (!) 51 (!) 51   Patient Position - Orthostatic VS: Lying Lying  Lying         Visual Acuity      ED Medications  Medications   amoxicillin (AMOXIL) capsule 500 mg (500 mg Oral Given 5/26/24 0028)       Diagnostic Studies  Results Reviewed       Procedure Component Value Units Date/Time    Chlamydia/GC amplified DNA by PCR [[id number]] Collected: 05/25/24 2057    Lab Status: In process Specimen: Urine, Other Updated: 05/26/24 0038    TSH, 3rd generation with Free T4 reflex [[id number]]  (Normal) Collected: 05/25/24 2057    Lab Status: Final result Specimen: Blood from Arm, Left Updated: 05/25/24 2231     TSH 3RD GENERATON 1.783 uIU/mL     Comprehensive metabolic panel [[id number]]  (Abnormal) Collected: 05/25/24 2057    Lab Status: Final result Specimen: Blood from Arm, Left Updated: 05/25/24 2134     Sodium 138 mmol/L      Potassium 3.6 mmol/L      Chloride 110 mmol/L      CO2 19 mmol/L      ANION GAP 9 mmol/L      BUN 26 mg/dL      Creatinine 0.91 mg/dL      Glucose 99 mg/dL      Calcium 9.8 mg/dL      AST 13 U/L      ALT 16 U/L      Alkaline Phosphatase 72 U/L      Total Protein 6.8 g/dL      Albumin 4.2 g/dL      Total Bilirubin 0.25 mg/dL      eGFR 79 ml/min/1.73sq m     Narrative:      National [employer] guidelines for Chronic Kidney Disease (CKD):     Stage 1 with normal or high GFR (GFR > 90 mL/min/1.73 square meters)    Stage 2 Mild CKD (GFR = 60-89 mL/min/1.73 square meters)    Stage 3A Moderate CKD (GFR = 45-59 mL/min/1.73 square meters)    Stage 3B Moderate CKD (GFR = 30-44  mL/min/1.73 square meters)    Stage 4 Severe CKD (GFR = 15-29 mL/min/1.73 square meters)    Stage 5 End Stage CKD (GFR <15 mL/min/1.73 square meters)  Note: GFR calculation is accurate only with a steady state creatinine    Magnesium [[id number]]  (Normal) Collected: 05/25/24 2057    Lab Status: Final result Specimen: Blood from Arm, Left Updated: 05/25/24 2134     Magnesium 2.0 mg/dL     Lipase [[id number]]  (Normal) Collected: 05/25/24 2057    Lab Status: Final result Specimen: Blood from Arm, Left Updated: 05/25/24 2134     Lipase 19 u/L     High Sensitivity Troponin I Random [[id number]]  (Abnormal) Collected: 05/25/24 2057    Lab Status: Final result Specimen: Blood from Arm, Left Updated: 05/25/24 2129     HS TnI random 3 ng/L     Urine Microscopic [[id number]]  (Abnormal) Collected: 05/25/24 2057    Lab Status: Final result Specimen: Urine, Clean Catch Updated: 05/25/24 2126     RBC, UA 0-1 /hpf      WBC, UA 0-1 /hpf      Epithelial Cells Moderate /hpf      Bacteria, UA Moderate /hpf     CBC and differential [[id number]]  (Abnormal) Collected: 05/25/24 2057    Lab Status: Final result Specimen: Blood from Arm, Left Updated: 05/25/24 2110     WBC 16.87 Thousand/uL      RBC 4.83 Million/uL      Hemoglobin 13.7 g/dL      Hematocrit 43.6 %      MCV 90 fL      MCH 28.4 pg      MCHC 31.4 g/dL      RDW 13.5 %      MPV 10.8 fL      Platelets 293 Thousands/uL      nRBC 0 /100 WBCs      Segmented % 70 %      Immature Grans % 0 %      Lymphocytes % 18 %      Monocytes % 7 %      Eosinophils Relative 4 %      Basophils Relative 1 %      Absolute Neutrophils 11.87 Thousands/µL      Absolute Immature Grans 0.07 Thousand/uL      Absolute Lymphocytes 3.10 Thousands/µL      Absolute Monocytes 1.12 Thousand/µL      Eosinophils Absolute 0.61 Thousand/µL      Basophils Absolute 0.10 Thousands/µL     UA w Reflex to Microscopic w Reflex to Culture [[id number]]  (Abnormal) Collected: 05/25/24 2057    Lab Status: Final result Specimen:  Urine, Clean Catch Updated: 05/25/24 2106     Color, UA Yellow     Clarity, UA Clear     Specific Gravity, UA >=1.030     pH, UA 6.0     Leukocytes, UA Negative     Nitrite, UA Negative     Protein, UA Trace mg/dl      Glucose, UA Negative mg/dl      Ketones, UA Negative mg/dl      Urobilinogen, UA 2.0 mg/dl      Bilirubin, UA Negative     Occult Blood, UA Negative    Lyme Total AB W Reflex to IGM/IGG [[id number]] Collected: 05/25/24 2057    Lab Status: In process Specimen: Blood from Arm, Left Updated: 05/25/24 2103    Narrative:      The following orders were created for panel order Lyme Total AB W Reflex to IGM/IGG.  Procedure                               Abnormality         Status                     ---------                               -----------         ------                     Lyme Total AB W Reflex t...[[id number]]                      In process                   Please view results for these tests on the individual orders.    Lyme Total AB W Reflex to IGM/IGG [[id number]] Collected: 05/25/24 2057    Lab Status: In process Specimen: Blood from Arm, Left Updated: 05/25/24 2103    POCT pregnancy, urine [[id number]]  (Normal) Resulted: 05/25/24 2057    Lab Status: Final result Updated: 05/25/24 2057     EXT Preg Test, Ur Negative     Control Valid                   No orders to display              Procedures  ECG 12 Lead Documentation Only    Date/Time: 5/25/2024 8:50 PM    Performed by: Lee Ann Batista MD  Authorized by: Lee Ann Batista MD    Indications / Diagnosis:  Fatigue  ECG reviewed by me, the ED Provider: yes    Patient location:  ED  Previous ECG:     Previous ECG:  Compared to current    Comparison ECG info:  4/5/24 1545    Similarity:  No change  Rate:     ECG rate:  46    ECG rate assessment: bradycardic    Rhythm:     Rhythm: sinus bradycardia    QRS:     QRS axis:  Normal  Comments:       no acute ischemic changes           ED Course  ED Course as of 05/26/24 7402    Sat May 25, 2024   2222 Metabolic acidosis is c/w acetazolamide use   2237 PVR 21 ml on bladder scan no evidence of UTI   Sun May 26, 2024   0039 Extensively reviewed labwork you have records indicate she has had the symptoms of urinary urgency frequency for several weeks she does have an upcoming urology appointment she has been taking oxybutynin for overactive bladder.  We discussed possibility of chlamydia as a cause of her urinary symptoms patient declines a pelvic exam but is agreeable to seeing chlamydia and GC to her urine test.  She does not wish to proceed with empiric treatment for the chlamydia gonorrhea due to constellation of other symptoms and recent history of tick bite I do recommend she complete a course of therapy for Lyme disease she will be given amoxicillin for 21 days and recommend she complete that unless advised otherwise by her family doctor the Lyme titers are pending.  She have any new or worsening symptoms she will return for reevaluation   0041 Will have patient complete her course of cephalexin             HEART Risk Score      Flowsheet Row Most Recent Value   Heart Score Risk Calculator    History 0 Filed at: 05/26/2024 0224   ECG 1 Filed at: 05/26/2024 0224   Age 0 Filed at: 05/26/2024 0224   Risk Factors 1 Filed at: 05/26/2024 0224   Troponin 0 Filed at: 05/26/2024 0224   HEART Score 2 Filed at: 05/26/2024 0224                          SBIRT 22yo+      Flowsheet Row Most Recent Value   Initial Alcohol Screen: US AUDIT-C     1. How often do you have a drink containing alcohol? 0 Filed at: 05/25/2024 2028   2. How many drinks containing alcohol do you have on a typical day you are drinking?  0 Filed at: 05/25/2024 2028   3a. Male UNDER 65: How often do you have five or more drinks on one occasion? 0 Filed at: 05/25/2024 2028   3b. FEMALE Any Age, or MALE 65+: How often do you have 4 or more drinks on one occassion? 0 Filed at: 05/25/2024 2028   Audit-C Score 0 Filed at: 05/25/2024  2028   JENNIFER: How many times in the past year have you...    Used an illegal drug or used a prescription medication for non-medical reasons? Never Filed at: 05/25/2024 2028                      Medical Decision Making  Mdm: Previous records reviewed including family medicine note from 5/29/2020 4/5/1724 and emergency department visit from 5/23/2024.  Patient has had greater than 1 month history of urinary frequency and urgency she was at 1 point treated for group B strep on 4/21/2024 urine culture.  Patient will be rechecked for UTI.  Due to the history of tick bite and malaise and fatigue a Lyme titer will be sent.  Will assess for acute coronary syndrome.  Assess thyroid.  As patient just had a negative CT scan within the last week do not recommend reimaging.  Not consistent with lithium toxicity.  Neurologic  exam is nonfocal.    Amount and/or Complexity of Data Reviewed  Labs: ordered.    Risk  Prescription drug management.             Disposition  Final diagnoses:   Malaise and fatigue   Tick bite - history of   Urinary frequency     Time reflects when diagnosis was documented in both MDM as applicable and the Disposition within this note       Time User Action Codes Description Comment    5/26/2024 12:28 AM Lee Ann Batista Add [R53.81,  R53.83] Malaise and fatigue     5/26/2024 12:28 AM Lee Ann Batista Add [W57.XXXA] Tick bite     5/26/2024 12:28 AM Lee Ann Batista Modify [W57.XXXA] Tick bite history of    5/26/2024 12:44 AM Lee Ann Batista Add [R35.0] Urinary frequency           ED Disposition       ED Disposition   Discharge    Condition   Stable    Date/Time   Sun May 26, 2024 0045    Comment   Gita Rendon discharge to home/self care.                   Follow-up Information       Follow up With Specialties Details Why Contact Info    Norma Traore MD Family Medicine Call in 2 days recheck of symptoms [facility] [address]  [phone]              Discharge  Medication List as of 5/26/2024 12:45 AM        START taking these medications    Details   amoxicillin (AMOXIL) 500 mg capsule Take 1 capsule (500 mg total) by mouth 3 (three) times a day for 21 days, Starting Sun 5/26/2024, Until Sun 6/16/2024, Normal           CONTINUE these medications which have NOT CHANGED    Details   acetaZOLAMIDE (DIAMOX) 250 mg tablet Take 2 tablets (500 mg total) by mouth 2 (two) times a day, Starting Tue 4/16/2024, Normal      almotriptan (AXERT) 12.5 MG tablet Take 12.5 mg by mouth once as needed for migraine may repeat in 2 hours if needed.  Limited to 2 x a week, Historical Med      Atogepant (Qulipta) 60 MG TABS Take 60 mg by mouth in the morning, Historical Med      cariprazine (VRAYLAR) 6 MG capsule Take 1 capsule (6 mg total) by mouth daily, Starting Wed 4/17/2024, Until Fri 5/17/2024, Normal      celecoxib (CeleBREX) 200 mg capsule Take 1 capsule (200 mg total) by mouth 2 (two) times a day, Starting Tue 4/16/2024, Normal      cephalexin (KEFLEX) 500 mg capsule Take 1 capsule (500 mg total) by mouth every 12 (twelve) hours for 5 days, Starting Thu 5/23/2024, Until Tue 5/28/2024, Normal      Cequa 0.09 % SOLN Apply 1 drop to eye 2 (two) times a day, Starting Sat 8/5/2023, Normal      cholecalciferol 1,000 units tablet Take 5 tablets (5,000 Units total) by mouth daily, Starting Tue 4/16/2024, Normal      eletriptan (RELPAX) 40 MG tablet Take 40 mg by mouth once as needed for migraine may repeat in 2 hours if necessary, Historical Med      Erenumab-aooe (AIMOVIG, 140 MG DOSE, SC) Inject 140 mg under the skin every 28 days, Historical Med      escitalopram (LEXAPRO) 10 mg tablet Take 1 tablet (10 mg total) by mouth daily, Starting Wed 4/17/2024, Until Fri 5/17/2024, Normal      furosemide (LASIX) 20 mg tablet Take 20 mg by mouth daily as needed, Historical Med      gabapentin (NEURONTIN) 300 mg capsule Take 1 capsule (300 mg total) by mouth 2 (two) times a day, Starting Tue 4/16/2024,  Normal      gabapentin (NEURONTIN) 600 MG tablet Take 1.5 tablets (900 mg total) by mouth daily at bedtime, Starting Tue 4/16/2024, Normal      levothyroxine 112 mcg tablet Take 1 tablet (112 mcg total) by mouth daily in the early morning, Starting Mon 4/29/2024, Normal      linaCLOtide (Linzess) 290 MCG CAPS Take 1 capsule by mouth daily, Starting Wed 3/20/2024, Until Mon 9/16/2024, Normal      lithium carbonate (LITHOBID) 450 mg CR tablet Take 1 tablet (450 mg total) by mouth 2 (two) times a day for 28 days, Starting Tue 4/16/2024, Until Tue 5/14/2024, Normal      loratadine (CLARITIN) 10 mg tablet Take 1 tablet (10 mg total) by mouth daily, Starting Tue 4/16/2024, Normal      LORazepam (ATIVAN) 0.5 mg tablet Take 0.5 mg by mouth 2 (two) times a day prn, Historical Med      loteprednol etabonate (LOTEMAX) 0.5 % ophthalmic suspension Historical Med      metFORMIN (GLUCOPHAGE) 500 mg tablet Take 1 tablet (500 mg total) by mouth daily with breakfast, Starting Tue 4/16/2024, Normal      Multiple Vitamin (MULTIVITAMIN) capsule Take 1 capsule by mouth daily, Historical Med      omeprazole (PriLOSEC) 20 mg delayed release capsule Take 1 capsule (20 mg total) by mouth 2 (two) times a day, Starting Tue 4/16/2024, Normal      OXcarbazepine (TRILEPTAL) 300 mg tablet Take 300 mg by mouth every 12 (twelve) hours, Historical Med      oxybutynin (DITROPAN-XL) 10 MG 24 hr tablet Take 1 tablet (10 mg total) by mouth daily at bedtime, Starting Fri 5/24/2024, Normal      !! phenazopyridine (PYRIDIUM) 100 mg tablet Take 1 tablet (100 mg total) by mouth 3 (three) times a day as needed for bladder spasms, Starting Sun 4/21/2024, Normal      !! phenazopyridine (PYRIDIUM) 100 mg tablet Take 1 tablet (100 mg total) by mouth 3 (three) times a day as needed for bladder spasms, Starting Sun 4/28/2024, Normal      prochlorperazine (COMPAZINE) 10 mg tablet Take 10 mg by mouth every 6 (six) hours as needed for nausea or vomiting Take 1 tablet  by TID if needed for headache or nausea. Limit to 2x a week ., Historical Med      propranolol (INDERAL) 20 mg tablet Take 1 tablet (20 mg total) by mouth every 12 (twelve) hours, Starting Tue 4/16/2024, Normal      Semaglutide-Weight Management (WEGOVY) 1.7 MG/0.75ML Inject 0.75 mL (1.7 mg total) under the skin once a week, Starting Thu 5/9/2024, Normal      spironolactone (ALDACTONE) 25 mg tablet Take 1 tablet (25 mg total) by mouth daily, Starting Tue 4/16/2024, Normal      topiramate (TOPAMAX) 50 MG tablet Take 3 tablets (150 mg total) by mouth 2 (two) times a day, Starting Tue 4/16/2024, Normal      traZODone (DESYREL) 100 mg tablet Take 100 mg by mouth daily at bedtime, Historical Med       !! - Potential duplicate medications found. Please discuss with provider.          No discharge procedures on file.    PDMP Review         Value Time User    PDMP Reviewed  Yes 4/16/2024  2:32 PM LEIGHA Kaiser            ED Provider  Electronically Signed by             Lee Ann Batista MD  05/26/24 [id number]

## 2024-05-26 NOTE — DISCHARGE INSTRUCTIONS
Finish cephalexain  Complete 21 day course of amoxicillin for possible lyme disease  Keep your followup appt with urology  Return with fever rash any new or worseng symptoms

## 2024-05-27 LAB
ATRIAL RATE: 46 BPM
B BURGDOR IGG+IGM SER QL IA: NEGATIVE
C TRACH DNA SPEC QL NAA+PROBE: NEGATIVE
N GONORRHOEA DNA SPEC QL NAA+PROBE: NEGATIVE
P AXIS: 31 DEGREES
PR INTERVAL: 180 MS
QRS AXIS: 31 DEGREES
QRSD INTERVAL: 90 MS
QT INTERVAL: 438 MS
QTC INTERVAL: 383 MS
T WAVE AXIS: 43 DEGREES
VENTRICULAR RATE: 46 BPM

## 2024-05-27 PROCEDURE — 93010 ELECTROCARDIOGRAM REPORT: CPT | Performed by: INTERNAL MEDICINE

## 2024-05-28 ENCOUNTER — TELEPHONE (OUTPATIENT)
Dept: FAMILY MEDICINE CLINIC | Facility: CLINIC | Age: 39
End: 2024-05-28

## 2024-05-28 DIAGNOSIS — N32.81 OVERACTIVE BLADDER: Primary | ICD-10-CM

## 2024-05-28 NOTE — TELEPHONE ENCOUNTER
Patient called into call center to schedule ER follow up. Patient is not available the first week of June. Patient is scheduled for 6/10 for ER follow up then 6/17 for AWV.     Are we able to do both appointments in one day?

## 2024-05-29 NOTE — TELEPHONE ENCOUNTER
Called patient to inform her that we will keep the appointment on 6/17. Patient had two questions below to ask.     Patient was tested for lyme disease and came out negative but ER doctor told her to stay on antibiotic for 21 days. Do you want patient to continue or discontinue?    Is patient able to increase dose of Oxybutynin until she sees urology? She is still having issues and was previously mybetriq not sure if that is something she should be prescribed or something else that would be helpful. Patient is not able to sleep since constantly getting up to use to the bathroom.

## 2024-05-29 NOTE — TELEPHONE ENCOUNTER
Called and confirmed all information with pt unfortunately no sooner appt at this time. Pt placed on wait list and ER precautions reviewed

## 2024-05-29 NOTE — TELEPHONE ENCOUNTER
Pt called requested sooner appt. Pt has been to the ER twice for this issue.     Please review

## 2024-05-30 ENCOUNTER — NURSE TRIAGE (OUTPATIENT)
Age: 39
End: 2024-05-30

## 2024-05-30 RX ORDER — OXYBUTYNIN CHLORIDE 15 MG/1
15 TABLET, EXTENDED RELEASE ORAL
Qty: 30 TABLET | Refills: 0 | Status: SHIPPED | OUTPATIENT
Start: 2024-05-30

## 2024-05-30 NOTE — TELEPHONE ENCOUNTER
11/30/2021 oriented to name and place today. Monitor. Fall and ulcer precautions.    Called patient to relay doctors message below. Patient will discontinue antibiotics and would like to increase her Oxybutynin to 15 MG. If you could send over another script?    Patient also is concerned her WBC being around 16 and maybe that's the cause of her feeling fatigue and not herself. She is just worried that she doesn't have answers.

## 2024-05-30 NOTE — TELEPHONE ENCOUNTER
"Consult 12/07/20, F/U 06/26/24, colonoscopy 10/15/21      Pt evaluated in ED 05/25 for generalized malaise. CT abd/pelv w con without acute findings. Elevated white count 16.87; prescribed amoxicillin 500 mg TID x 21 days. Pt reports she does not drink water. On Linzess 290 mg with formed BM \"every few days\". Pt reports BM's are foul smelling. Generalized intermittent abdominal cramping, denies vomiting, fever, hematochezia. Intermittent nausea which she attributes to Wegovy 1.7 mg subQ weekly. Pt has follow up scheduled 06/26 Petey, PCP.        Reason for Disposition   Nursing judgment    Answer Assessment - Initial Assessment Questions  1. REASON FOR CALL or QUESTION: \"What is your reason for calling today?\" or \"How can I best help you?\" or \"What question do you have that I can help answer?\"      Please see note    Protocols used: Information Only Call - No Triage-ADULT-[place]    "

## 2024-05-31 ENCOUNTER — NURSE TRIAGE (OUTPATIENT)
Dept: OTHER | Facility: OTHER | Age: 39
End: 2024-05-31

## 2024-05-31 NOTE — TELEPHONE ENCOUNTER
"Reason for Disposition  • [1] MILD weakness (i.e., does not interfere with ability to work, go to school, normal activities) AND [2] persists > 1 week    Answer Assessment - Initial Assessment Questions  1. DESCRIPTION: \"Describe how you are feeling.\"      Feeling very \"run down\", fatigued, decreased energy  2. SEVERITY: \"How bad is it?\"  \"Can you stand and walk?\"    - MILD - Feels weak or tired, but does not interfere with work, school or normal activities    - MODERATE - Able to stand and walk; weakness interferes with work, school, or normal activities    - SEVERE - Unable to stand or walk      mild  3. ONSET:  \"When did the weakness begin?\"      About 2-3 weeks ago  4. CAUSE: \"What do you think is causing the weakness?\"      unsure  5. MEDICINES: \"Have you recently started a new medicine or had a change in the amount of a medicine?\"      Finished Keflex early this week that had been prescribed for a UTI  6. OTHER SYMPTOMS: \"Do you have any other symptoms?\" (e.g., chest pain, fever, cough, SOB, vomiting, diarrhea, bleeding, other areas of pain)      Mild urinary urgency and frequency    Protocols used: Weakness (Generalized) and Fatigue-ADULT-AH    "

## 2024-05-31 NOTE — TELEPHONE ENCOUNTER
"Regarding: Not feeling good, run down  ----- Message from Erin CHAVEZ sent at 5/31/2024  6:08 PM EDT -----  \" I haven't been feeling good for the last couple of weeks, I feel so run down, I need advise.\"    "

## 2024-05-31 NOTE — TELEPHONE ENCOUNTER
Patient was offered appointments with the office for a recheck however she noted the week of June 3rd was very busy. I advised that with current symptoms she may want to consider an urgent care visit this weekend.

## 2024-06-03 ENCOUNTER — TELEPHONE (OUTPATIENT)
Age: 39
End: 2024-06-03

## 2024-06-03 NOTE — TELEPHONE ENCOUNTER
Pt called wanting to inform provider her concerns of not feeling well for the last 2-3 weeks.  Pt states she has been feeling fatigued and low energy and lethargic.      Pt states lightheadedness is pretty constant and she gets HA daily.  Pt states she sees a neurologist via [facility].  Pt states when she gets these HA she can't be productive and has to lay down and rest or else they won't resolve.  Pt can't take IBU d/t Celebrex use but Tylenol is not effective.  Pt states she gets 8-10 hrs of sleep a night and also naps throughout the day still as she is still tired.      Pt had 2 ED visits recently and knows that she has upcoming AWV on 6/17/24 but would like to know if PCP has any recommendations or would like further testing done prior to appt time.  Please review and advise.  Pt would like a call back with provider recommendations.

## 2024-06-04 ENCOUNTER — OFFICE VISIT (OUTPATIENT)
Dept: UROLOGY | Facility: CLINIC | Age: 39
End: 2024-06-04
Payer: COMMERCIAL

## 2024-06-04 VITALS
WEIGHT: 293 LBS | HEART RATE: 59 BPM | SYSTOLIC BLOOD PRESSURE: 116 MMHG | HEIGHT: 67 IN | BODY MASS INDEX: 45.99 KG/M2 | OXYGEN SATURATION: 97 % | DIASTOLIC BLOOD PRESSURE: 72 MMHG

## 2024-06-04 DIAGNOSIS — R39.9 UTI SYMPTOMS: ICD-10-CM

## 2024-06-04 DIAGNOSIS — R35.0 URINARY FREQUENCY: Primary | ICD-10-CM

## 2024-06-04 LAB
SL AMB  POCT GLUCOSE, UA: NEGATIVE
SL AMB LEUKOCYTE ESTERASE,UA: NORMAL
SL AMB POCT BILIRUBIN,UA: NORMAL
SL AMB POCT BLOOD,UA: NORMAL
SL AMB POCT CLARITY,UA: CLEAR
SL AMB POCT COLOR,UA: YELLOW
SL AMB POCT KETONES,UA: NORMAL
SL AMB POCT NITRITE,UA: NEGATIVE
SL AMB POCT PH,UA: 5.5
SL AMB POCT SPECIFIC GRAVITY,UA: 1.03
SL AMB POCT URINE PROTEIN: NORMAL
SL AMB POCT UROBILINOGEN: 0.2

## 2024-06-04 PROCEDURE — 81002 URINALYSIS NONAUTO W/O SCOPE: CPT

## 2024-06-04 PROCEDURE — 99203 OFFICE O/P NEW LOW 30 MIN: CPT

## 2024-06-04 RX ORDER — TOPIRAMATE 100 MG/1
TABLET, FILM COATED ORAL
COMMUNITY
Start: 2024-06-01

## 2024-06-04 NOTE — PROGRESS NOTES
6/4/2024    Chief Complaint   Patient presents with    Follow-up     NEWP to be established UTI, seen in ER 5/25/2024       Assessment and Plan    38 y.o. female new patient to office    1.  Urinary frequency and urgency  I suspect this is more likely an overactive bladder component or even from fluid volume excess as opposed to infection.  Recent UA and Microscopic testing have been negative and there has not been urine culture data to support true infection.  Point-of-care urine dip today shows trace leukocytes negative nitrates and blood.  We will send for urine culture to exclude current infection.  Although her symptoms are improving.  Her symptoms are improving with oxybutynin 15 mg daily which I recommend she continue.  Consider the use of as needed Lasix if she continues with lower extremity edema.  She can follow-up on an as-needed basis.  I did discuss that if her symptoms do not improve I would recommend pelvic floor physical therapy and she can increase her oxybutynin to a max dose of 30 mg daily if necessary.      History of Present Illness  Gita Rendon is a 38 y.o. female new patient to office. Here for evaluation of urinary frequency and urgency.    She presents today reporting that approximately 2 weeks ago her PCP had referred her to the emergency department due to concerns for possible obstructing kidney stone.  She had been complaining of flank pain with increased urinary frequency and urgency.  She was seen at [facility] emergency department on 5/22/2024.  CT imaging was unremarkable without evidence of urinary tract calculi or hydronephrosis bilaterally.  Urinary bladder was also unremarkable.  Urinalysis showed trace leukocytes but negative nitrates or blood.  Microscopic analysis however showed no RBCs, 0-1 WBCs, no bacteria.  She was however treated for suspected UTI and was prescribed Keflex.  Her frequency and urgency did not improve.  Her PCP then started her on oxybutynin  initially at 10 mg for which she has since increased to 15 mg daily.  She was seen again at [facility] emergency department on 5/25/2024 for complaints of UTI symptoms and fatigue.  Urinalysis this time was negative for leukocytes nitrates or blood.  Microscopic analysis showed 0-1 RBCs, 0-1 WBCs, and moderate bacteria.  She also reported a tick bite so she was empirically treated with 500 mg 3 times daily for 21 days.  She has since discontinued this after her Lyme total antibody was negative.  She feels her urinary frequency and urgency is improving with continued use of Ditropan, she also reported lower extremity edema and took Lasix which seems to have also helped her frequency and urgency.  She was prescribed Lasix due to her history of IIH (idiopathic intracranial hypertension) but she typically takes Diamox instead which is more effective for her.     History of urinary frequency and urgency and previously followed with Dr. Ardon in the past.  She had good results from PTNS and pelvic floor physical therapy previously.  In the past she had been on both Myrbetriq and oxybutynin.  She feels that oxybutynin provides her better relief of her symptoms than Myrbetriq.        Urine cultures:    4/21/2024: 80,000-89,000 cfu/ml Beta Hemolytic Streptococcus Group B and <10,000 cfu/mL Mixed contaminants x2    4/28/2024: <10,0000 cfu/ml Proteus species          Review of Systems   Constitutional:  Negative for chills and fever.   HENT:  Negative for ear pain and sore throat.    Eyes:  Negative for pain and visual disturbance.   Respiratory:  Negative for cough and shortness of breath.    Cardiovascular:  Negative for chest pain and palpitations.   Gastrointestinal:  Negative for abdominal pain and vomiting.   Genitourinary:  Positive for frequency and urgency. Negative for dysuria and hematuria.   Musculoskeletal:  Negative for arthralgias and back pain.   Skin:  Negative for color change and rash.  "  Neurological:  Negative for seizures and syncope.   All other systems reviewed and are negative.              Vitals  Vitals:    06/04/24 1454   BP: 116/72   Pulse: 59   SpO2: 97%   Weight: (!) 149 kg (329 lb 3.2 oz)   Height: 5' 7\" (1.702 m)       Physical Exam  Vitals reviewed.   Constitutional:       General: She is not in acute distress.     Appearance: Normal appearance. She is normal weight. She is not ill-appearing or toxic-appearing.   HENT:      Head: Normocephalic and atraumatic.      Nose: Nose normal.   Eyes:      General: No scleral icterus.     Conjunctiva/sclera: Conjunctivae normal.   Cardiovascular:      Rate and Rhythm: Normal rate.      Pulses: Normal pulses.   Pulmonary:      Effort: Pulmonary effort is normal. No respiratory distress.   Abdominal:      General: Abdomen is flat.      Palpations: Abdomen is soft.      Tenderness: There is no abdominal tenderness. There is no right CVA tenderness or left CVA tenderness.      Hernia: No hernia is present.   Musculoskeletal:         General: Normal range of motion.      Cervical back: Normal range of motion.   Skin:     General: Skin is warm and dry.   Neurological:      General: No focal deficit present.      Mental Status: She is alert and oriented to person, place, and time. Mental status is at baseline.   Psychiatric:         Mood and Affect: Mood normal.         Behavior: Behavior normal.         Thought Content: Thought content normal.         Judgment: Judgment normal.         Past History  Past Medical History:   Diagnosis Date    Allergic     Allergic rhinitis     Anorexia nervosa in remission     Anxiety     Arthritis 5/8/2014    Back pain     Bipolar disorder (HCC)     Depression     Dermatitis 10/16/2021    Disease of thyroid gland     Diverticulitis of colon 9/20/2015    Dizziness     Ear problems     Eating disorder     Esophageal reflux 08/15/2013    GERD (gastroesophageal reflux disease) 8/15/2013    Headache(784.0)     Headache, " tension-type     Hypertension     Hypothyroid     Idiopathic intracranial hypertension     Lumbar degenerative disc disease 05/08/2014    Migraine     Nasal congestion     Nosebleed     Obesity     Obsessive-compulsive disorder     Otitis media     Overactive bladder     Psychiatric disorder     Restless leg syndrome, controlled     Seasonal allergies     Sinusitis     Sleep apnea     Sleep difficulties     Suicide and self-inflicted injury (HCC)     Tinnitus     TMJ dysfunction     Tonsillitis     Transcranial Magnetic Stimulation 09/06/2021    Urinary tract infection     Vision loss      Social History     Socioeconomic History    Marital status: Single     Spouse name: None    Number of children: None    Years of education: None    Highest education level: None   Occupational History    Occupation: DISABLED   Tobacco Use    Smoking status: Never     Passive exposure: Never    Smokeless tobacco: Never   Vaping Use    Vaping status: Never Used   Substance and Sexual Activity    Alcohol use: Never    Drug use: Never    Sexual activity: Not Currently     Partners: Female     Comment: No STDs   Other Topics Concern    None   Social History Narrative    Always uses seat belts    Caffeine use     Disabled - permament disability since 2008 due to psychiatric illness    Has no children    Single     Tea     Social Determinants of Health     Financial Resource Strain: Medium Risk (3/15/2023)    Overall Financial Resource Strain (CARDIA)     Difficulty of Paying Living Expenses: Somewhat hard   Food Insecurity: No Food Insecurity (4/5/2024)    Hunger Vital Sign     Worried About Running Out of Food in the Last Year: Never true     Ran Out of Food in the Last Year: Never true   Transportation Needs: No Transportation Needs (4/5/2024)    PRAPARE - Transportation     Lack of Transportation (Medical): No     Lack of Transportation (Non-Medical): No   Physical Activity: Not on file   Stress: Not on file   Social Connections: Not  on file   Intimate Partner Violence: Not At Risk (2024)    Humiliation, Afraid, Rape, and Kick questionnaire     Fear of Current or Ex-Partner: No     Emotionally Abused: No     Physically Abused: No     Sexually Abused: No   Housing Stability: Low Risk  (2024)    Housing Stability Vital Sign     Unable to Pay for Housing in the Last Year: No     Number of Places Lived in the Last Year: 2     Unstable Housing in the Last Year: No     Social History     Tobacco Use   Smoking Status Never    Passive exposure: Never   Smokeless Tobacco Never     Family History   Problem Relation Age of Onset    Mental illness Mother     Depression Mother     Suicide Attempts Mother     Hypertension Mother     Diabetes Mother     Other Mother         bicuspid aortic valve    Anxiety disorder Mother     Psychiatric Illness Mother     Schizoaffective Disorder  Mother     Anemia Mother     Hypertension Father     Hypothyroidism Father     Thyroid disease Father     Hypertension Brother     Cancer Maternal Grandmother     Heart disease Maternal Grandmother     Stroke Maternal Grandmother     Breast cancer Maternal Grandmother     Skin cancer Maternal Grandmother     Arthritis Maternal Grandmother     Pneumonia Maternal Grandfather     Cancer Maternal Grandfather     Dementia Maternal Grandfather     COPD Maternal Grandfather     Cancer Paternal Grandmother     Pneumonia Paternal Grandfather     Arthritis Family     Breast cancer Family     Osteopenia Family     Osteoporosis Family     Hypertension Family     Transient ischemic attack Family        The following portions of the patient's history were reviewed and updated as appropriate allergies, current medications, past medical history, past social history, past surgical history and problem list    [id number]    CT ABDOMEN AND PELVIS WITH IV CONTRAST     INDICATION: L flank pain.     COMPARISON: 3/28/2024.     TECHNIQUE: CT examination of the abdomen and pelvis was  performed. Multiplanar 2D reformatted images were created from the source data.     This examination, like all CT scans performed in the [facility], was performed utilizing techniques to minimize radiation dose exposure, including the use of iterative reconstruction and automated exposure control. Radiation dose length   product (DLP) for this visit: 1829.37 mGy-cm     IV Contrast: 100 mL of iohexol (OMNIPAQUE)  Enteric Contrast: Not administered.     FINDINGS:     ABDOMEN     LOWER CHEST: No clinically significant abnormality in the visualized lower chest.     LIVER/BILIARY TREE: Unremarkable.     GALLBLADDER: No calcified gallstones. No pericholecystic inflammatory change.     SPLEEN: Unremarkable.     PANCREAS: Unremarkable.     ADRENAL GLANDS: Unremarkable.     KIDNEYS/URETERS: Unremarkable. No hydronephrosis.     STOMACH AND BOWEL: Colonic diverticulosis without findings of acute diverticulitis.     APPENDIX: Normal.     ABDOMINOPELVIC CAVITY: Small volume of free pelvic fluid, likely physiologic given the patient's age and gender. No pneumoperitoneum. No lymphadenopathy.     VESSELS: Unremarkable for patient's age.     PELVIS     REPRODUCTIVE ORGANS: Probable involuting corpus luteum in the right ovary.     URINARY BLADDER: Unremarkable.     ABDOMINAL WALL/INGUINAL REGIONS: Unremarkable.     BONES: No acute fracture or suspicious osseous lesion. Spinal degenerative changes most severe at L2-L3.     IMPRESSION:     No acute findings in the abdomen or pelvis.    Results  Recent Results (from the past 1 hour(s))   POCT urine dip    Collection Time: 06/04/24  3:13 PM   Result Value Ref Range    LEUKOCYTE ESTERASE,UA +/15     NITRITE,UA negative     SL AMB POCT UROBILINOGEN 0.2     POCT URINE PROTEIN 1+/30      PH,UA 5.5     BLOOD,UA 3+/200     SPECIFIC GRAVITY,UA 1.030     KETONES,UA +/5     BILIRUBIN,UA 1+/1     GLUCOSE, UA negative      COLOR,UA yellow     CLARITY,UA clear    ]  No results found  "for: \"PSA\"  Lab Results   Component Value Date    GLUCOSE 89 10/20/2015    CALCIUM 9.8 05/25/2024     10/20/2015    K 3.6 05/25/2024    CO2 19 (L) 05/25/2024     (H) 05/25/2024    BUN 26 (H) 05/25/2024    CREATININE 0.91 05/25/2024     Lab Results   Component Value Date    WBC 16.87 (H) 05/25/2024    HGB 13.7 05/25/2024    HCT 43.6 05/25/2024    MCV 90 05/25/2024     05/25/2024       Please Note:  Voice dictation software has been used to create this document. There may be inadvertent transcriptions errors.     LEIGHA Romero 06/04/24   "

## 2024-06-04 NOTE — TELEPHONE ENCOUNTER
Patient canceled follow up appt after being seen in the ED. Would you like us to call to schedule an appt to address her concerns?    Thanks  Ayesha

## 2024-06-06 ENCOUNTER — RA CDI HCC (OUTPATIENT)
Dept: OTHER | Facility: HOSPITAL | Age: 39
End: 2024-06-06

## 2024-06-07 DIAGNOSIS — E66.01 MORBID OBESITY (HCC): ICD-10-CM

## 2024-06-12 ENCOUNTER — OFFICE VISIT (OUTPATIENT)
Dept: NEPHROLOGY | Facility: CLINIC | Age: 39
End: 2024-06-12
Payer: COMMERCIAL

## 2024-06-12 ENCOUNTER — TELEPHONE (OUTPATIENT)
Dept: NEPHROLOGY | Facility: CLINIC | Age: 39
End: 2024-06-12

## 2024-06-12 VITALS
HEART RATE: 52 BPM | HEIGHT: 67 IN | OXYGEN SATURATION: 98 % | WEIGHT: 293 LBS | SYSTOLIC BLOOD PRESSURE: 110 MMHG | BODY MASS INDEX: 45.99 KG/M2 | DIASTOLIC BLOOD PRESSURE: 60 MMHG

## 2024-06-12 DIAGNOSIS — R30.0 DYSURIA: ICD-10-CM

## 2024-06-12 DIAGNOSIS — I10 HYPERTENSION, UNSPECIFIED TYPE: ICD-10-CM

## 2024-06-12 DIAGNOSIS — G93.2 INTRACRANIAL HYPERTENSION: Primary | ICD-10-CM

## 2024-06-12 DIAGNOSIS — E26.9 HYPERALDOSTERONISM (HCC): ICD-10-CM

## 2024-06-12 DIAGNOSIS — E87.20 METABOLIC ACIDOSIS: ICD-10-CM

## 2024-06-12 PROCEDURE — 99214 OFFICE O/P EST MOD 30 MIN: CPT | Performed by: NURSE PRACTITIONER

## 2024-06-12 RX ORDER — ACETAZOLAMIDE 250 MG/1
500 TABLET ORAL 2 TIMES DAILY
Qty: 120 TABLET | Refills: 2 | Status: SHIPPED | OUTPATIENT
Start: 2024-06-12

## 2024-06-12 NOTE — TELEPHONE ENCOUNTER
Yecenia called back from dr humphrey office.  Dr humphrey will reduce dose of propanolol    Pts lithium level of 5/8 was 0.57  If u have any questions call yecenia at [phone]

## 2024-06-12 NOTE — PROGRESS NOTES
Nephrology   Office Follow-Up  Gita Rendon 38 y.o. female MRN: [id number]    Encounter: [id number]        Assessment & Plan    Gita Rendon was seen in the [facility] office today. All diagnoses and orders for visit:     1. Intracranial hypertension  Currently managed on Diamox 500 mg BID. Has only used lasix once since I last saw her. Follows with [facility]. Tco2 trends stable at this point. Will continue with monthly lab work. She is aware she cannot become pregnant on this medication. She is aware her bicarbonate may reduce while on this medication. Refill sent to pharmacy  -     acetaZOLAMIDE (DIAMOX) 250 mg tablet; Take 2 tablets (500 mg total) by mouth 2 (two) times a day  2. Metabolic acidosis  In setting of diamox. Pt also on topirimate. Continue to follow monthly BMP and tco2 trends  -     Basic metabolic panel; Future  -     Urinalysis with microscopic; Future  -     Phosphorus; Future  -     Magnesium; Future  -     CBC and Platelet; Future  3. Dysuria  Recently evaluated by Urologist and ER visit. No acute findings on enhanced CT scan. There was leukocytosis. She did not get urine culture done yet- reordered.   -     Urine culture; Future  4. Hyperaldosteronism (HCC)  Continue spironolactone at this time. If her BP drops can reduce or discontinue  5. Hypertension, unspecified type  Blood pressure is well controlled. She has bradycardia and notes heart rates as low as 40s on smartwatch. She is on propanolol per psychiatry- I will reach out and see if this can be lowered      HPI: Gita Rendon is a 38 y.o. female who is here for scheduled follow-up regarding diagnoses as listed above    Bicarb stable on Diamox 500 mg BID. Will continue to follow monthly BMPs. Will reach out to psychiatrist to see if propanolol dose can be reduced given concern for symptomatic bradycardia. Will also find out when next lithium level is due         ROS:   Review of Systems   Constitutional:  Positive  for activity change and fatigue. Negative for chills and fever.        Fatigue, generally feeling unwell   HENT:  Negative for ear pain and sore throat.    Eyes:  Negative for pain and visual disturbance.   Respiratory:  Negative for cough and shortness of breath.    Cardiovascular:  Negative for chest pain and palpitations.   Gastrointestinal:  Negative for abdominal pain and vomiting.   Endocrine: Positive for cold intolerance.   Genitourinary:  Negative for dysuria and hematuria.   Musculoskeletal:  Positive for arthralgias. Negative for back pain.   Skin:  Negative for color change and rash.   Neurological:  Negative for seizures and syncope.   All other systems reviewed and are negative.      Allergies: Doxycycline and Other    Medications:   Current Outpatient Medications:     acetaZOLAMIDE (DIAMOX) 250 mg tablet, Take 2 tablets (500 mg total) by mouth 2 (two) times a day, Disp: 120 tablet, Rfl: 2    almotriptan (AXERT) 12.5 MG tablet, Take 12.5 mg by mouth once as needed for migraine may repeat in 2 hours if needed.  Limited to 2 x a week, Disp: , Rfl:     celecoxib (CeleBREX) 200 mg capsule, Take 1 capsule (200 mg total) by mouth 2 (two) times a day, Disp: 60 capsule, Rfl: 0    Cequa 0.09 % SOLN, Apply 1 drop to eye 2 (two) times a day, Disp: 60 each, Rfl: 0    cholecalciferol 1,000 units tablet, Take 5 tablets (5,000 Units total) by mouth daily, Disp: 150 tablet, Rfl: 0    escitalopram (LEXAPRO) 10 mg tablet, Take 1 tablet (10 mg total) by mouth daily (Patient taking differently: Take 20 mg by mouth daily), Disp: 30 tablet, Rfl: 0    furosemide (LASIX) 20 mg tablet, Take 20 mg by mouth daily as needed, Disp: , Rfl:     gabapentin (NEURONTIN) 300 mg capsule, Take 1 capsule (300 mg total) by mouth 2 (two) times a day (Patient taking differently: Take 600 mg by mouth daily), Disp: 60 capsule, Rfl: 0    gabapentin (NEURONTIN) 600 MG tablet, Take 1.5 tablets (900 mg total) by mouth daily at bedtime, Disp: 45  tablet, Rfl: 0    levothyroxine 112 mcg tablet, Take 1 tablet (112 mcg total) by mouth daily in the early morning, Disp: 90 tablet, Rfl: 1    linaCLOtide (Linzess) 290 MCG CAPS, Take 1 capsule by mouth daily, Disp: 90 capsule, Rfl: 1    lithium carbonate (LITHOBID) 450 mg CR tablet, Take 1 tablet (450 mg total) by mouth 2 (two) times a day for 28 days (Patient taking differently: Take 450 mg by mouth 2 (two) times a day Patient is taking 1 tablet in the morning and 2 tablets in the evening.), Disp: 28 tablet, Rfl: 1    loteprednol etabonate (LOTEMAX) 0.5 % ophthalmic suspension, , Disp: , Rfl:     metFORMIN (GLUCOPHAGE) 500 mg tablet, Take 1 tablet (500 mg total) by mouth daily with breakfast, Disp: 30 tablet, Rfl: 0    Multiple Vitamin (MULTIVITAMIN) capsule, Take 1 capsule by mouth daily, Disp: , Rfl:     omeprazole (PriLOSEC) 20 mg delayed release capsule, Take 1 capsule (20 mg total) by mouth 2 (two) times a day, Disp: 60 capsule, Rfl: 0    OXcarbazepine (TRILEPTAL) 300 mg tablet, Take 300 mg by mouth every 12 (twelve) hours, Disp: , Rfl:     oxybutynin (DITROPAN XL) 15 MG 24 hr tablet, Take 1 tablet (15 mg total) by mouth daily at bedtime, Disp: 30 tablet, Rfl: 0    prochlorperazine (COMPAZINE) 10 mg tablet, Take 10 mg by mouth every 6 (six) hours as needed for nausea or vomiting Take 1 tablet by TID if needed for headache or nausea. Limit to 2x a week ., Disp: , Rfl:     propranolol (INDERAL) 20 mg tablet, Take 1 tablet (20 mg total) by mouth every 12 (twelve) hours, Disp: 60 tablet, Rfl: 0    Semaglutide-Weight Management (WEGOVY) 1.7 MG/0.75ML, Inject 0.75 mL (1.7 mg total) under the skin once a week, Disp: 3 mL, Rfl: 1    spironolactone (ALDACTONE) 25 mg tablet, Take 1 tablet (25 mg total) by mouth daily, Disp: 30 tablet, Rfl: 0    topiramate (TOPAMAX) 100 mg tablet, TAKE 1.5 TABLETS BID, Disp: , Rfl:     traZODone (DESYREL) 100 mg tablet, Take 100 mg by mouth daily at bedtime, Disp: , Rfl:     Atogepant  (Qulipta) 60 MG TABS, Take 60 mg by mouth in the morning, Disp: , Rfl:     cariprazine (VRAYLAR) 6 MG capsule, Take 1 capsule (6 mg total) by mouth daily, Disp: 30 capsule, Rfl: 0    Past Medical History:   Diagnosis Date    Allergic     Allergic rhinitis     Anorexia nervosa in remission     Anxiety     Arthritis 5/8/2014    Back pain     Bipolar disorder (HCC)     Depression     Dermatitis 10/16/2021    Disease of thyroid gland     Diverticulitis of colon 9/20/2015    Dizziness     Ear problems     Eating disorder     Esophageal reflux 08/15/2013    GERD (gastroesophageal reflux disease) 8/15/2013    Headache(784.0)     Headache, tension-type     Hypertension     Hypothyroid     Idiopathic intracranial hypertension     Lumbar degenerative disc disease 05/08/2014    Migraine     Nasal congestion     Nosebleed     Obesity     Obsessive-compulsive disorder     Otitis media     Overactive bladder     Psychiatric disorder     Restless leg syndrome, controlled     Seasonal allergies     Sinusitis     Sleep apnea     Sleep difficulties     Suicide and self-inflicted injury (HCC)     Tinnitus     TMJ dysfunction     Tonsillitis     Transcranial Magnetic Stimulation 09/06/2021    Urinary tract infection     Vision loss      Past Surgical History:   Procedure Laterality Date    DENTAL SURGERY      wisdom teeth-at [age] years. Other surgery dental extraxtion of bone spur (10 years ago)    IR LUMBAR PUNCTURE  02/26/2019    SINUS ENDOSCOPY      SINUS SURGERY      TONSILLECTOMY       Family History   Problem Relation Age of Onset    Mental illness Mother     Depression Mother     Suicide Attempts Mother     Hypertension Mother     Diabetes Mother     Other Mother         bicuspid aortic valve    Anxiety disorder Mother     Psychiatric Illness Mother     Schizoaffective Disorder  Mother     Anemia Mother     Hypertension Father     Hypothyroidism Father     Thyroid disease Father     Hypertension Brother     Cancer Maternal  "Grandmother     Heart disease Maternal Grandmother     Stroke Maternal Grandmother     Breast cancer Maternal Grandmother     Skin cancer Maternal Grandmother     Arthritis Maternal Grandmother     Pneumonia Maternal Grandfather     Cancer Maternal Grandfather     Dementia Maternal Grandfather     COPD Maternal Grandfather     Cancer Paternal Grandmother     Pneumonia Paternal Grandfather     Arthritis Family     Breast cancer Family     Osteopenia Family     Osteoporosis Family     Hypertension Family     Transient ischemic attack Family       reports that she has never smoked. She has never been exposed to tobacco smoke. She has never used smokeless tobacco. She reports that she does not drink alcohol and does not use drugs.      Physical Exam:   Vitals:    06/12/24 1307   BP: 110/60   BP Location: Left arm   Patient Position: Sitting   Cuff Size: Large   Pulse: (!) 52   SpO2: 98%   Weight: (!) 148 kg (327 lb)   Height: 5' 7\" (1.702 m)     Body mass index is 51.22 kg/m².    General: conscious, cooperative, in no acute distress, appears stated age  Eyes: conjunctivae pale, anicteric sclerae  ENT: lips and mucous membranes moist  Neck: supple, no JVD, no masses  Chest:  essentially clear breath sounds bilaterally, no crackles, ronchus or wheezings  CVS: S1 & S2, normal rate, regular rhythm  Abdomen: soft, non-tender, non-distended, normoactive bowel sounds, rounded obese  Extremities: no edema of both legs  Skin: no rash   Neuro: awake, alert, oriented       Diagnostic Data:  Lab: I have personally reviewed pertinent lab results.,   CBC:       CMP: No results found for: \"SODIUM\", \"K\", \"CL\", \"CO2\", \"ANIONGAP\", \"BUN\", \"CREATININE\", \"GLUCOSE\", \"CALCIUM\", \"AST\", \"ALT\", \"ALKPHOS\", \"PROT\", \"BILITOT\", \"EGFR\",   PT/INR: No results found for: \"PT\", \"INR\",   Magnesium: No components found for: \"MAG\",  Phosphorous: No results found for: \"PHOS\"    Patient Instructions   It was nice to see you today. Please get labs done no " "later than July 12th    Portions of the record may have been created with voice recognition software. Occasional wrong word or \"sound a like\" substitutions may have occurred due to the inherent limitations of voice recognition software. Read the chart carefully and recognize, using context, where substitutions have occurred.    If you have any questions, please contact the dictating provider.  "

## 2024-06-13 DIAGNOSIS — G43.909 MIGRAINE HEADACHE: ICD-10-CM

## 2024-06-13 RX ORDER — PROPRANOLOL HYDROCHLORIDE 10 MG/1
10 TABLET ORAL EVERY 12 HOURS SCHEDULED
Qty: 180 TABLET | Refills: 1 | Status: SHIPPED | OUTPATIENT
Start: 2024-06-13

## 2024-06-13 NOTE — TELEPHONE ENCOUNTER
Called and spoke with patient.  Aware of the following:  let her know I communicated with Dr. Weston and he is okay with reducing propanolol so lets go ahead and reduce to 10 mg twice a day. I sent new pills to the pharmacy.

## 2024-06-15 ENCOUNTER — APPOINTMENT (OUTPATIENT)
Dept: LAB | Facility: CLINIC | Age: 39
End: 2024-06-15
Payer: COMMERCIAL

## 2024-06-15 DIAGNOSIS — E87.20 METABOLIC ACIDOSIS: ICD-10-CM

## 2024-06-15 DIAGNOSIS — R30.0 DYSURIA: ICD-10-CM

## 2024-06-15 LAB
ANION GAP SERPL CALCULATED.3IONS-SCNC: 7 MMOL/L (ref 4–13)
BACTERIA UR QL AUTO: ABNORMAL /HPF
BILIRUB UR QL STRIP: NEGATIVE
BUN SERPL-MCNC: 16 MG/DL (ref 5–25)
CALCIUM SERPL-MCNC: 9.6 MG/DL (ref 8.4–10.2)
CHLORIDE SERPL-SCNC: 111 MMOL/L (ref 96–108)
CLARITY UR: ABNORMAL
CO2 SERPL-SCNC: 19 MMOL/L (ref 21–32)
COLOR UR: YELLOW
CREAT SERPL-MCNC: 0.77 MG/DL (ref 0.6–1.3)
ERYTHROCYTE [DISTWIDTH] IN BLOOD BY AUTOMATED COUNT: 13.5 % (ref 11.6–15.1)
GFR SERPL CREATININE-BSD FRML MDRD: 97 ML/MIN/1.73SQ M
GLUCOSE P FAST SERPL-MCNC: 101 MG/DL (ref 65–99)
GLUCOSE UR STRIP-MCNC: NEGATIVE MG/DL
HCT VFR BLD AUTO: 44.9 % (ref 34.8–46.1)
HGB BLD-MCNC: 14.1 G/DL (ref 11.5–15.4)
HGB UR QL STRIP.AUTO: NEGATIVE
HYALINE CASTS #/AREA URNS LPF: ABNORMAL /LPF
KETONES UR STRIP-MCNC: NEGATIVE MG/DL
LEUKOCYTE ESTERASE UR QL STRIP: ABNORMAL
MAGNESIUM SERPL-MCNC: 2.1 MG/DL (ref 1.9–2.7)
MCH RBC QN AUTO: 28.8 PG (ref 26.8–34.3)
MCHC RBC AUTO-ENTMCNC: 31.4 G/DL (ref 31.4–37.4)
MCV RBC AUTO: 92 FL (ref 82–98)
MUCOUS THREADS UR QL AUTO: ABNORMAL
NITRITE UR QL STRIP: NEGATIVE
NON-SQ EPI CELLS URNS QL MICRO: ABNORMAL /HPF
PH UR STRIP.AUTO: 6 [PH]
PHOSPHATE SERPL-MCNC: 4.1 MG/DL (ref 2.7–4.5)
PLATELET # BLD AUTO: 342 THOUSANDS/UL (ref 149–390)
PMV BLD AUTO: 11.6 FL (ref 8.9–12.7)
POTASSIUM SERPL-SCNC: 4 MMOL/L (ref 3.5–5.3)
PROT UR STRIP-MCNC: ABNORMAL MG/DL
RBC # BLD AUTO: 4.89 MILLION/UL (ref 3.81–5.12)
RBC #/AREA URNS AUTO: ABNORMAL /HPF
SODIUM SERPL-SCNC: 137 MMOL/L (ref 135–147)
SP GR UR STRIP.AUTO: 1.02 (ref 1–1.03)
UROBILINOGEN UR STRIP-ACNC: <2 MG/DL
WBC # BLD AUTO: 13.97 THOUSAND/UL (ref 4.31–10.16)
WBC #/AREA URNS AUTO: ABNORMAL /HPF

## 2024-06-15 PROCEDURE — 85027 COMPLETE CBC AUTOMATED: CPT

## 2024-06-15 PROCEDURE — 83735 ASSAY OF MAGNESIUM: CPT

## 2024-06-15 PROCEDURE — 87077 CULTURE AEROBIC IDENTIFY: CPT

## 2024-06-15 PROCEDURE — 87086 URINE CULTURE/COLONY COUNT: CPT

## 2024-06-15 PROCEDURE — 84100 ASSAY OF PHOSPHORUS: CPT

## 2024-06-15 PROCEDURE — 87186 SC STD MICRODIL/AGAR DIL: CPT

## 2024-06-15 PROCEDURE — 81001 URINALYSIS AUTO W/SCOPE: CPT

## 2024-06-15 PROCEDURE — 36415 COLL VENOUS BLD VENIPUNCTURE: CPT

## 2024-06-15 PROCEDURE — 80048 BASIC METABOLIC PNL TOTAL CA: CPT

## 2024-06-17 ENCOUNTER — OFFICE VISIT (OUTPATIENT)
Dept: FAMILY MEDICINE CLINIC | Facility: CLINIC | Age: 39
End: 2024-06-17
Payer: COMMERCIAL

## 2024-06-17 VITALS
DIASTOLIC BLOOD PRESSURE: 70 MMHG | WEIGHT: 293 LBS | HEIGHT: 67 IN | OXYGEN SATURATION: 97 % | HEART RATE: 59 BPM | BODY MASS INDEX: 45.99 KG/M2 | SYSTOLIC BLOOD PRESSURE: 114 MMHG | TEMPERATURE: 99.2 F

## 2024-06-17 DIAGNOSIS — R35.0 URINARY FREQUENCY: ICD-10-CM

## 2024-06-17 DIAGNOSIS — K21.9 GASTROESOPHAGEAL REFLUX DISEASE WITHOUT ESOPHAGITIS: ICD-10-CM

## 2024-06-17 DIAGNOSIS — J34.89 RHINORRHEA: ICD-10-CM

## 2024-06-17 DIAGNOSIS — N32.81 OVERACTIVE BLADDER: ICD-10-CM

## 2024-06-17 DIAGNOSIS — G93.2 INTRACRANIAL HYPERTENSION: ICD-10-CM

## 2024-06-17 DIAGNOSIS — R20.0 NUMBNESS AND TINGLING IN BOTH HANDS: ICD-10-CM

## 2024-06-17 DIAGNOSIS — R20.2 NUMBNESS AND TINGLING IN BOTH HANDS: ICD-10-CM

## 2024-06-17 DIAGNOSIS — G43.709 CHRONIC MIGRAINE WITHOUT AURA WITHOUT STATUS MIGRAINOSUS, NOT INTRACTABLE: ICD-10-CM

## 2024-06-17 DIAGNOSIS — Z00.00 MEDICARE ANNUAL WELLNESS VISIT, SUBSEQUENT: Primary | ICD-10-CM

## 2024-06-17 LAB — BACTERIA UR CULT: ABNORMAL

## 2024-06-17 PROCEDURE — G0439 PPPS, SUBSEQ VISIT: HCPCS | Performed by: FAMILY MEDICINE

## 2024-06-17 PROCEDURE — 99214 OFFICE O/P EST MOD 30 MIN: CPT | Performed by: FAMILY MEDICINE

## 2024-06-17 RX ORDER — ESCITALOPRAM OXALATE 20 MG/1
20 TABLET ORAL DAILY
COMMUNITY
End: 2024-06-17 | Stop reason: SDUPTHER

## 2024-06-17 RX ORDER — NITROFURANTOIN 25; 75 MG/1; MG/1
100 CAPSULE ORAL 2 TIMES DAILY
Qty: 10 CAPSULE | Refills: 0 | Status: SHIPPED | OUTPATIENT
Start: 2024-06-17 | End: 2024-06-22

## 2024-06-17 RX ORDER — MIRABEGRON 25 MG/1
25 TABLET, FILM COATED, EXTENDED RELEASE ORAL DAILY
Qty: 30 TABLET | Refills: 0 | Status: SHIPPED | OUTPATIENT
Start: 2024-06-17

## 2024-06-17 RX ORDER — GABAPENTIN 600 MG/1
600 TABLET ORAL
COMMUNITY

## 2024-06-17 RX ORDER — IPRATROPIUM BROMIDE 21 UG/1
2 SPRAY, METERED NASAL EVERY 12 HOURS
Qty: 30 ML | Refills: 0 | Status: SHIPPED | OUTPATIENT
Start: 2024-06-17

## 2024-06-17 RX ORDER — OMEPRAZOLE 20 MG/1
20 CAPSULE, DELAYED RELEASE ORAL 2 TIMES DAILY
Qty: 180 CAPSULE | Refills: 1 | Status: SHIPPED | OUTPATIENT
Start: 2024-06-17

## 2024-06-17 RX ORDER — ESCITALOPRAM OXALATE 20 MG/1
20 TABLET ORAL DAILY
COMMUNITY
Start: 2024-06-05

## 2024-06-17 NOTE — PROGRESS NOTES
Ambulatory Visit  Name: Gita Rendon      : 1985      MRN: [id number]  Encounter Provider: Norma Traore MD  Encounter Date: 2024   Encounter department: [facility] PRIMARY CARE    Assessment & Plan   1. Medicare annual wellness visit, subsequent  2. Numbness and tingling in both hands  3. Gastroesophageal reflux disease without esophagitis  -     omeprazole (PriLOSEC) 20 mg delayed release capsule; Take 1 capsule (20 mg total) by mouth 2 (two) times a day  4. Overactive bladder  -     Mirabegron ER 25 MG TB24; Take 25 mg by mouth daily  5. Rhinorrhea  -     ipratropium (ATROVENT) 0.03 % nasal spray; 2 sprays into each nostril every 12 (twelve) hours  6. Urinary frequency  -  PCP noted urine culture which had been sent out by Nephrology, given patient presented with elevated temp ( 99.2F) and worsening frequency overnight, PCP did go ahead and send abx therapy  - During visit, patient did get phone call from Nephrology and both patient and PCP discussed with Nephrology staff that abx was sent over.  - PCP discussed with patient that should symptoms continue, despite treatment with abx and Macrobid that follow up with Urology would be reasonable     -nitrofurantoin (MACROBID) 100 mg capsule; Take 1 capsule (100 mg total) by mouth 2 (two) times a day for 5 days  7. Intracranial hypertension  -     Ambulatory Referral to Neurology; Future  8. Chronic migraine without aura without status migrainosus, not intractable  -     Ambulatory Referral to Neurology; Future      Depression Screening and Follow-up Plan: Patient's depression screening was positive with a PHQ-2 score of 6. Their PHQ-9 score was 22. Continue regular follow-up with their mental health provider who is managing their mental health condition(s).       Preventive health issues were discussed with patient, and age appropriate screening tests were ordered as noted in patient's After Visit Summary. Personalized health  advice and appropriate referrals for health education or preventive services given if needed, as noted in patient's After Visit Summary.    History of Present Illness     Patient saw Urology on 6/4/2024 I did discuss that if her symptoms do not improve I would recommend pelvic floor physical therapy and she can increase her oxybutynin to a max dose of 30 mg daily if necessary.   Patient reports that she has been experiencing worsening dry eyes with continued use of oxybutynin and would like to discontinue pharmacotherapy and switch to mirabegron             Patient Care Team:  Norma Traore MD as PCP - General (Family Medicine)  Corona Lopez DO as PCP - PCP-[facility] ([facility])  Norma Traore MD as PCP - PCP-[facility] ([facility])  YANG Irvin DO Kaveh Kousari, MD Gbolabo Sokunbi, MD Kathryn Nassar, PA-C (Physician Assistant)  LEIGHA Arthur as Nurse Practitioner (Nephrology)    Review of Systems   Genitourinary:  Positive for frequency.        Increased overnight, going anywhere from every 30 mins to 1.5 hours     Medical History Reviewed by provider this encounter:  [facility]       Annual Wellness Visit Questionnaire   Gita is here for her Subsequent Wellness visit. Last Medicare Wellness visit information reviewed, patient interviewed and updates made to the record today.      Health Risk Assessment:   Patient rates overall health as fair. Patient feels that their physical health rating is much worse. Patient is very dissatisfied with their life. Eyesight was rated as same. Hearing was rated as same. Patient feels that their emotional and mental health rating is much worse. Patients states they are never, rarely angry. Patient states they are always unusually tired/fatigued. Pain experienced in the last 7 days has been some. Patient's pain rating has been 5/10. Patient states that she has experienced no weight loss or gain in last 6 months.     Depression  Screening:   PHQ-2 Score: 6  PHQ-9 Score: 22      Fall Risk Screening:   In the past year, patient has experienced: history of falling in past year    Number of falls: 2 or more  Injured during fall?: No    Feels unsteady when standing or walking?: No    Worried about falling?: No      Urinary Incontinence Screening:   Patient has leaked urine accidently in the last six months.     Home Safety:  Patient does not have trouble with stairs inside or outside of their home. Patient has working smoke alarms and has working carbon monoxide detector. Home safety hazards include: medications that cause fatigue.     Nutrition:   Current diet is Regular and Limited junk food.     Medications:   Patient is currently taking over-the-counter supplements. OTC medications include: see medication list. Patient is able to manage medications.     Activities of Daily Living (ADLs)/Instrumental Activities of Daily Living (IADLs):   Walk and transfer into and out of bed and chair?: Yes  Dress and groom yourself?: Yes    Bathe or shower yourself?: Yes    Feed yourself? Yes  Do your laundry/housekeeping?: Yes  Manage your money, pay your bills and track your expenses?: Yes  Make your own meals?: Yes    Do your own shopping?: Yes    Previous Hospitalizations:   Any hospitalizations or ED visits within the last 12 months?: Yes    How many hospitalizations have you had in the last year?: 1-2    Advance Care Planning:   Living will: No    Durable POA for healthcare: No    Advanced directive: No    Advanced directive counseling given: No    Five wishes given: No      PREVENTIVE SCREENINGS      Cardiovascular Screening:    General: Screening Current      Diabetes Screening:     General: Screening Current      Colorectal Cancer Screening:     General: Screening Not Indicated      Breast Cancer Screening:     General: Screening Not Indicated      Cervical Cancer Screening:    General: Screening Current      Osteoporosis Screening:    General:  "Screening Not Indicated      Lung Cancer Screening:     General: Screening Not Indicated      Hepatitis C Screening:    General: Screening Current    Screening, Brief Intervention, and Referral to Treatment (SBIRT)    Screening  Typical number of drinks in a day: 0  Typical number of drinks in a week: 0  Interpretation: Low risk drinking behavior.    AUDIT-C Screenin) How often did you have a drink containing alcohol in the past year? never  2) How many drinks did you have on a typical day when you were drinking in the past year? 0  3) How often did you have 6 or more drinks on one occasion in the past year? never    AUDIT-C Score: 0  Interpretation: Score 0-2 (female): Negative screen for alcohol misuse    Single Item Drug Screening:  How often have you used an illegal drug (including marijuana) or a prescription medication for non-medical reasons in the past year? never    Single Item Drug Screen Score: 0  Interpretation: Negative screen for possible drug use disorder    Social Determinants of Health     Financial Resource Strain: Medium Risk (3/15/2023)    Overall Financial Resource Strain (CARDIA)     Difficulty of Paying Living Expenses: Somewhat hard   Food Insecurity: No Food Insecurity (2024)    Hunger Vital Sign     Worried About Running Out of Food in the Last Year: Never true     Ran Out of Food in the Last Year: Never true   Transportation Needs: No Transportation Needs (2024)    PRAPARE - Transportation     Lack of Transportation (Medical): No     Lack of Transportation (Non-Medical): No   Housing Stability: Low Risk  (2024)    Housing Stability Vital Sign     Unable to Pay for Housing in the Last Year: No     Number of Times Moved in the Last Year: 1     Homeless in the Last Year: No   Utilities: Not At Risk (2024)    [facility] Utilities     Threatened with loss of utilities: No     No results found.    Objective     /70   Pulse 59   Temp 99.2 °F (37.3 °C)   Ht 5' 7\" (1.702 " m)   Wt (!) 149 kg (329 lb)   SpO2 97%   BMI 51.53 kg/m²     Physical Exam  Constitutional:       Appearance: She is well-developed.   HENT:      Head: Normocephalic and atraumatic.   Eyes:      Conjunctiva/sclera: Conjunctivae normal.   Cardiovascular:      Rate and Rhythm: Normal rate and regular rhythm.      Heart sounds: Normal heart sounds.   Pulmonary:      Effort: Pulmonary effort is normal.      Breath sounds: Normal breath sounds.   Musculoskeletal:      Cervical back: Normal range of motion and neck supple.   Skin:     General: Skin is warm and dry.   Neurological:      Mental Status: She is alert and oriented to person, place, and time.       Administrative Statements

## 2024-06-17 NOTE — PATIENT INSTRUCTIONS
Medicare Preventive Visit Patient Instructions  Thank you for completing your Welcome to Medicare Visit or Medicare Annual Wellness Visit today. Your next wellness visit will be due in one year (6/18/2025).  The screening/preventive services that you may require over the next 5-10 years are detailed below. Some tests may not apply to you based off risk factors and/or age. Screening tests ordered at today's visit but not completed yet may show as past due. Also, please note that scanned in results may not display below.  Preventive Screenings:  Service Recommendations Previous Testing/Comments   Colorectal Cancer Screening  * Colonoscopy    * Fecal Occult Blood Test (FOBT)/Fecal Immunochemical Test (FIT)  * Fecal DNA/Cologuard Test  * Flexible Sigmoidoscopy Age: 45-75 years old   Colonoscopy: every 10 years (may be performed more frequently if at higher risk)  OR  FOBT/FIT: every 1 year  OR  Cologuard: every 3 years  OR  Sigmoidoscopy: every 5 years  Screening may be recommended earlier than age 45 if at higher risk for colorectal cancer. Also, an individualized decision between you and your healthcare provider will decide whether screening between the ages of 76-85 would be appropriate. Colonoscopy: 10/15/2021  FOBT/FIT: Not on file  Cologuard: Not on file  Sigmoidoscopy: Not on file          Breast Cancer Screening Age: 40+ years old  Frequency: every 1-2 years  Not required if history of left and right mastectomy Mammogram: 12/06/2018    Screening Not Indicated   Cervical Cancer Screening Between the ages of 21-29, pap smear recommended once every 3 years.   Between the ages of 30-65, can perform pap smear with HPV co-testing every 5 years.   Recommendations may differ for women with a history of total hysterectomy, cervical cancer, or abnormal pap smears in past. Pap Smear: 05/11/2022    Screening Current   Hepatitis C Screening Once for adults born between 1945 and 1965  More frequently in patients at high risk  for Hepatitis C Hep C Antibody: Not on file        Diabetes Screening 1-2 times per year if you're at risk for diabetes or have pre-diabetes Fasting glucose: 101 mg/dL (6/15/2024)  A1C: 5.3 % (4/5/2024)  Screening Current   Cholesterol Screening Once every 5 years if you don't have a lipid disorder. May order more often based on risk factors. Lipid panel: 04/05/2024    Screening Current     Other Preventive Screenings Covered by Medicare:  Abdominal Aortic Aneurysm (AAA) Screening: covered once if your at risk. You're considered to be at risk if you have a family history of AAA.  Lung Cancer Screening: covers low dose CT scan once per year if you meet all of the following conditions: (1) Age 55-77; (2) No signs or symptoms of lung cancer; (3) Current smoker or have quit smoking within the last 15 years; (4) You have a tobacco smoking history of at least 20 pack years (packs per day multiplied by number of years you smoked); (5) You get a written order from a healthcare provider.  Glaucoma Screening: covered annually if you're considered high risk: (1) You have diabetes OR (2) Family history of glaucoma OR (3)  aged 50 and older OR (4)  American aged 65 and older  Osteoporosis Screening: covered every 2 years if you meet one of the following conditions: (1) You're estrogen deficient and at risk for osteoporosis based off medical history and other findings; (2) Have a vertebral abnormality; (3) On glucocorticoid therapy for more than 3 months; (4) Have primary hyperparathyroidism; (5) On osteoporosis medications and need to assess response to drug therapy.   Last bone density test (DXA Scan): Not on file.  HIV Screening: covered annually if you're between the age of 15-65. Also covered annually if you are younger than 15 and older than 65 with risk factors for HIV infection. For pregnant patients, it is covered up to 3 times per pregnancy.    Immunizations:  Immunization Recommendations    Influenza Vaccine Annual influenza vaccination during flu season is recommended for all persons aged >= 6 months who do not have contraindications   Pneumococcal Vaccine   * Pneumococcal conjugate vaccine = PCV13 (Prevnar 13), PCV15 (Vaxneuvance), PCV20 (Prevnar 20)  * Pneumococcal polysaccharide vaccine = PPSV23 (Pneumovax) Adults 19-65 yo with certain risk factors or if 65+ yo  If never received any pneumonia vaccine: recommend Prevnar 20 (PCV20)  Give PCV20 if previously received 1 dose of PCV13 or PPSV23   Hepatitis B Vaccine 3 dose series if at intermediate or high risk (ex: diabetes, end stage renal disease, liver disease)   Respiratory syncytial virus (RSV) Vaccine - COVERED BY MEDICARE PART D  * RSVPreF3 (Arexvy) CDC recommends that adults 60 years of age and older may receive a single dose of RSV vaccine using shared clinical decision-making (SCDM)   Tetanus (Td) Vaccine - COST NOT COVERED BY MEDICARE PART B Following completion of primary series, a booster dose should be given every 10 years to maintain immunity against tetanus. Td may also be given as tetanus wound prophylaxis.   Tdap Vaccine - COST NOT COVERED BY MEDICARE PART B Recommended at least once for all adults. For pregnant patients, recommended with each pregnancy.   Shingles Vaccine (Shingrix) - COST NOT COVERED BY MEDICARE PART B  2 shot series recommended in those 19 years and older who have or will have weakened immune systems or those 50 years and older     Health Maintenance Due:      Topic Date Due   • Hepatitis C Screening  Never done   • HIV Screening  Never done   • Cervical Cancer Screening  05/11/2025     Immunizations Due:      Topic Date Due   • COVID-19 Vaccine (5 - 2023-24 season) 09/01/2023   • Influenza Vaccine (Season Ended) 09/01/2024     Advance Directives   What are advance directives?  Advance directives are legal documents that state your wishes and plans for medical care. These plans are made ahead of time in case you  lose your ability to make decisions for yourself. Advance directives can apply to any medical decision, such as the treatments you want, and if you want to donate organs.   What are the types of advance directives?  There are many types of advance directives, and each state has rules about how to use them. You may choose a combination of any of the following:  Living will:  This is a written record of the treatment you want. You can also choose which treatments you do not want, which to limit, and which to stop at a certain time. This includes surgery, medicine, IV fluid, and tube feedings.   Durable power of  for healthcare (DPAHC):  This is a written record that states who you want to make healthcare choices for you when you are unable to make them for yourself. This person, called a proxy, is usually a family member or a friend. You may choose more than 1 proxy.  Do not resuscitate (DNR) order:  A DNR order is used in case your heart stops beating or you stop breathing. It is a request not to have certain forms of treatment, such as CPR. A DNR order may be included in other types of advance directives.  Medical directive:  This covers the care that you want if you are in a coma, near death, or unable to make decisions for yourself. You can list the treatments you want for each condition. Treatment may include pain medicine, surgery, blood transfusions, dialysis, IV or tube feedings, and a ventilator (breathing machine).  Values history:  This document has questions about your views, beliefs, and how you feel and think about life. This information can help others choose the care that you would choose.  Why are advance directives important?  An advance directive helps you control your care. Although spoken wishes may be used, it is better to have your wishes written down. Spoken wishes can be misunderstood, or not followed. Treatments may be given even if you do not want them. An advance directive may make  it easier for your family to make difficult choices about your care.   Urinary Incontinence   Urinary incontinence (UI)  is when you lose control of your bladder. UI develops because your bladder cannot store or empty urine properly. The 3 most common types of UI are stress incontinence, urge incontinence, or both.  Medicines:   May be given to help strengthen your bladder control. Report any side effects of medication to your healthcare provider.  Do pelvic muscle exercises often:  Your pelvic muscles help you stop urinating. Squeeze these muscles tight for 5 seconds, then relax for 5 seconds. Gradually work up to squeezing for 10 seconds. Do 3 sets of 15 repetitions a day, or as directed. This will help strengthen your pelvic muscles and improve bladder control.  Train your bladder:  Go to the bathroom at set times, such as every 2 hours, even if you do not feel the urge to go. You can also try to hold your urine when you feel the urge to go. For example, hold your urine for 5 minutes when you feel the urge to go. As that becomes easier, hold your urine for 10 minutes.   Self-care:   Keep a UI record.  Write down how often you leak urine and how much you leak. Make a note of what you were doing when you leaked urine.  Drink liquids as directed. You may need to limit the amount of liquid you drink to help control your urine leakage. Do not drink any liquid right before you go to bed. Limit or do not have drinks that contain caffeine or alcohol.   Prevent constipation.  Eat a variety of high-fiber foods. Good examples are high-fiber cereals, beans, vegetables, and whole-grain breads. Walking is the best way to trigger your intestines to have a bowel movement.  Exercise regularly and maintain a healthy weight.  Weight loss and exercise will decrease pressure on your bladder and help you control your leakage.   Use a catheter as directed  to help empty your bladder. A catheter is a tiny, plastic tube that is put into  your bladder to drain your urine.   Go to behavior therapy as directed.  Behavior therapy may be used to help you learn to control your urge to urinate.    Weight Management   Why it is important to manage your weight:  Being overweight increases your risk of health conditions such as heart disease, high blood pressure, type 2 diabetes, and certain types of cancer. It can also increase your risk for osteoarthritis, sleep apnea, and other respiratory problems. Aim for a slow, steady weight loss. Even a small amount of weight loss can lower your risk of health problems.  How to lose weight safely:  A safe and healthy way to lose weight is to eat fewer calories and get regular exercise. You can lose up about 1 pound a week by decreasing the number of calories you eat by 500 calories each day.   Healthy meal plan for weight management:  A healthy meal plan includes a variety of foods, contains fewer calories, and helps you stay healthy. A healthy meal plan includes the following:  Eat whole-grain foods more often.  A healthy meal plan should contain fiber. Fiber is the part of grains, fruits, and vegetables that is not broken down by your body. Whole-grain foods are healthy and provide extra fiber in your diet. Some examples of whole-grain foods are whole-wheat breads and pastas, oatmeal, brown rice, and bulgur.  Eat a variety of vegetables every day.  Include dark, leafy greens such as spinach, kale, jaquan greens, and mustard greens. Eat yellow and orange vegetables such as carrots, sweet potatoes, and winter squash.   Eat a variety of fruits every day.  Choose fresh or canned fruit (canned in its own juice or light syrup) instead of juice. Fruit juice has very little or no fiber.  Eat low-fat dairy foods.  Drink fat-free (skim) milk or 1% milk. Eat fat-free yogurt and low-fat cottage cheese. Try low-fat cheeses such as mozzarella and other reduced-fat cheeses.  Choose meat and other protein foods that are low in  fat.  Choose beans or other legumes such as split peas or lentils. Choose fish, skinless poultry (chicken or turkey), or lean cuts of red meat (beef or pork). Before you cook meat or poultry, cut off any visible fat.   Use less fat and oil.  Try baking foods instead of frying them. Add less fat, such as margarine, sour cream, regular salad dressing and mayonnaise to foods. Eat fewer high-fat foods. Some examples of high-fat foods include french fries, doughnuts, ice cream, and cakes.  Eat fewer sweets.  Limit foods and drinks that are high in sugar. This includes candy, cookies, regular soda, and sweetened drinks.  Exercise:  Exercise at least 30 minutes per day on most days of the week. Some examples of exercise include walking, biking, dancing, and swimming. You can also fit in more physical activity by taking the stairs instead of the elevator or parking farther away from stores. Ask your healthcare provider about the best exercise plan for you.      © Copyright [employer] 2018 Information is for End User's use only and may not be sold, redistributed or otherwise used for commercial purposes. All illustrations and images included in [employer]Notes® are the copyrighted property of [employer] or [employer]

## 2024-06-20 ENCOUNTER — APPOINTMENT (OUTPATIENT)
Dept: LAB | Facility: CLINIC | Age: 39
End: 2024-06-20
Payer: COMMERCIAL

## 2024-06-20 DIAGNOSIS — Z51.81 ENCOUNTER FOR THERAPEUTIC DRUG MONITORING: ICD-10-CM

## 2024-06-20 LAB — LITHIUM SERPL-SCNC: 1.5 MMOL/L (ref 0.6–1.2)

## 2024-06-20 PROCEDURE — 36415 COLL VENOUS BLD VENIPUNCTURE: CPT

## 2024-06-20 PROCEDURE — 80178 ASSAY OF LITHIUM: CPT

## 2024-06-21 ENCOUNTER — PATIENT MESSAGE (OUTPATIENT)
Dept: FAMILY MEDICINE CLINIC | Facility: CLINIC | Age: 39
End: 2024-06-21

## 2024-06-21 ENCOUNTER — HOSPITAL ENCOUNTER (EMERGENCY)
Facility: HOSPITAL | Age: 39
Discharge: HOME | End: 2024-06-21
Attending: EMERGENCY MEDICINE
Payer: COMMERCIAL

## 2024-06-21 VITALS
WEIGHT: 293 LBS | BODY MASS INDEX: 45.99 KG/M2 | SYSTOLIC BLOOD PRESSURE: 117 MMHG | HEART RATE: 52 BPM | TEMPERATURE: 98 F | HEIGHT: 67 IN | RESPIRATION RATE: 18 BRPM | OXYGEN SATURATION: 99 % | DIASTOLIC BLOOD PRESSURE: 61 MMHG

## 2024-06-21 DIAGNOSIS — R79.89 ELEVATED LITHIUM LEVEL: Primary | ICD-10-CM

## 2024-06-21 LAB
ALBUMIN SERPL BCG-MCNC: 4.3 G/DL (ref 3.5–5)
ALP SERPL-CCNC: 95 U/L (ref 34–104)
ALT SERPL W P-5'-P-CCNC: 12 U/L (ref 7–52)
ANION GAP SERPL CALCULATED.3IONS-SCNC: 8 MMOL/L (ref 4–13)
APAP SERPL-MCNC: <2 UG/ML (ref 10–20)
AST SERPL W P-5'-P-CCNC: 11 U/L (ref 13–39)
BASOPHILS # BLD AUTO: 0.11 THOUSANDS/ÂΜL (ref 0–0.1)
BASOPHILS NFR BLD AUTO: 1 % (ref 0–1)
BILIRUB SERPL-MCNC: 0.28 MG/DL (ref 0.2–1)
BUN SERPL-MCNC: 16 MG/DL (ref 5–25)
CALCIUM SERPL-MCNC: 10.4 MG/DL (ref 8.4–10.2)
CARDIAC TROPONIN I PNL SERPL HS: 3 NG/L
CHLORIDE SERPL-SCNC: 110 MMOL/L (ref 96–108)
CO2 SERPL-SCNC: 18 MMOL/L (ref 21–32)
CREAT SERPL-MCNC: 0.78 MG/DL (ref 0.6–1.3)
EOSINOPHIL # BLD AUTO: 0.78 THOUSAND/ÂΜL (ref 0–0.61)
EOSINOPHIL NFR BLD AUTO: 5 % (ref 0–6)
ERYTHROCYTE [DISTWIDTH] IN BLOOD BY AUTOMATED COUNT: 13.2 % (ref 11.6–15.1)
ETHANOL SERPL-MCNC: <10 MG/DL
GFR SERPL CREATININE-BSD FRML MDRD: 96 ML/MIN/1.73SQ M
GLUCOSE SERPL-MCNC: 94 MG/DL (ref 65–140)
HCT VFR BLD AUTO: 45.1 % (ref 34.8–46.1)
HGB BLD-MCNC: 14 G/DL (ref 11.5–15.4)
IMM GRANULOCYTES # BLD AUTO: 0.05 THOUSAND/UL (ref 0–0.2)
IMM GRANULOCYTES NFR BLD AUTO: 0 % (ref 0–2)
LYMPHOCYTES # BLD AUTO: 2.97 THOUSANDS/ÂΜL (ref 0.6–4.47)
LYMPHOCYTES NFR BLD AUTO: 19 % (ref 14–44)
MCH RBC QN AUTO: 28.1 PG (ref 26.8–34.3)
MCHC RBC AUTO-ENTMCNC: 31 G/DL (ref 31.4–37.4)
MCV RBC AUTO: 91 FL (ref 82–98)
MONOCYTES # BLD AUTO: 1.12 THOUSAND/ÂΜL (ref 0.17–1.22)
MONOCYTES NFR BLD AUTO: 7 % (ref 4–12)
NEUTROPHILS # BLD AUTO: 10.44 THOUSANDS/ÂΜL (ref 1.85–7.62)
NEUTS SEG NFR BLD AUTO: 68 % (ref 43–75)
NRBC BLD AUTO-RTO: 0 /100 WBCS
PLATELET # BLD AUTO: 312 THOUSANDS/UL (ref 149–390)
PMV BLD AUTO: 10.1 FL (ref 8.9–12.7)
POTASSIUM SERPL-SCNC: 3.9 MMOL/L (ref 3.5–5.3)
PROT SERPL-MCNC: 6.7 G/DL (ref 6.4–8.4)
RBC # BLD AUTO: 4.98 MILLION/UL (ref 3.81–5.12)
SALICYLATES SERPL-MCNC: <5 MG/DL (ref 3–20)
SODIUM SERPL-SCNC: 136 MMOL/L (ref 135–147)
TSH SERPL DL<=0.05 MIU/L-ACNC: 0.43 UIU/ML (ref 0.45–4.5)
WBC # BLD AUTO: 15.47 THOUSAND/UL (ref 4.31–10.16)

## 2024-06-21 PROCEDURE — 99285 EMERGENCY DEPT VISIT HI MDM: CPT | Performed by: PHYSICIAN ASSISTANT

## 2024-06-21 PROCEDURE — 80053 COMPREHEN METABOLIC PANEL: CPT | Performed by: PHYSICIAN ASSISTANT

## 2024-06-21 PROCEDURE — 84443 ASSAY THYROID STIM HORMONE: CPT | Performed by: PHYSICIAN ASSISTANT

## 2024-06-21 PROCEDURE — 84484 ASSAY OF TROPONIN QUANT: CPT | Performed by: PHYSICIAN ASSISTANT

## 2024-06-21 PROCEDURE — 80178 ASSAY OF LITHIUM: CPT | Performed by: PHYSICIAN ASSISTANT

## 2024-06-21 PROCEDURE — 93005 ELECTROCARDIOGRAM TRACING: CPT

## 2024-06-21 PROCEDURE — 96360 HYDRATION IV INFUSION INIT: CPT

## 2024-06-21 PROCEDURE — 85025 COMPLETE CBC W/AUTO DIFF WBC: CPT | Performed by: PHYSICIAN ASSISTANT

## 2024-06-21 PROCEDURE — 80143 DRUG ASSAY ACETAMINOPHEN: CPT | Performed by: PHYSICIAN ASSISTANT

## 2024-06-21 PROCEDURE — 36415 COLL VENOUS BLD VENIPUNCTURE: CPT | Performed by: PHYSICIAN ASSISTANT

## 2024-06-21 PROCEDURE — 80179 DRUG ASSAY SALICYLATE: CPT | Performed by: PHYSICIAN ASSISTANT

## 2024-06-21 PROCEDURE — 82077 ASSAY SPEC XCP UR&BREATH IA: CPT | Performed by: PHYSICIAN ASSISTANT

## 2024-06-21 PROCEDURE — 99283 EMERGENCY DEPT VISIT LOW MDM: CPT

## 2024-06-21 RX ADMIN — SODIUM CHLORIDE 1000 ML: 0.9 INJECTION, SOLUTION INTRAVENOUS at 16:50

## 2024-06-21 NOTE — DISCHARGE INSTRUCTIONS
I would recommend cutting your propranolol dose in half given your bradycardia.   do not take your lithium over the weekend and have psychiatry or primary care recheck early next week.  Return with any worsening symptoms.    In general being on diuretics, nonsteroidal anti-inflammatories and lithium together is in general a bad combination for lithium toxicity I would urge your primary care team to look at these medications being used in conjunction with each other.

## 2024-06-21 NOTE — ED PROVIDER NOTES
History  Chief Complaint   Patient presents with    Abnormal Lab     Patient reports that she had blood work done yesterday and her lithium level was high. Patient reports that she has been feeling exhausted and nauseas.      This is a 38-year-old female presenting to the emergency department today with concerns over elevated serum lithium levels that was noted on outpatient blood draw earlier this week.  Patient states she was started on lithium about 4 months ago.  Her initial dose was 450 mg twice daily, when rechecked the level was not therapeutic therefore about a month ago the dose was increased to 450 mg in the morning then 900 mg in the evening.  She states this is the first time her level was checked since this change in medicine.  She offers symptomatology of feeling fatigued this been ongoing for about 2 months however worsening she also offers complaints of urinary frequency however no hematuria or dysuria.  She was on oxybutynin however discontinued it secondary to side effect of dry eyes.  She feels as though the urinary frequency worse over the last 2 weeks.  She feels as though over the last week she may have some intermittent chest tightness as well mostly in the evenings.    Of note patient is on both diuretics as well as NSAIDs in conjunction with lithium.        Prior to Admission Medications   Prescriptions Last Dose Informant Patient Reported? Taking?   Atogepant (Qulipta) 60 MG TABS  Self Yes No   Sig: Take 60 mg by mouth in the morning   Cequa 0.09 % SOLN  Self No No   Sig: Apply 1 drop to eye 2 (two) times a day   Mirabegron ER 25 MG TB24   No No   Sig: Take 25 mg by mouth daily   Multiple Vitamin (MULTIVITAMIN) capsule  Self Yes No   Sig: Take 1 capsule by mouth daily   Semaglutide-Weight Management (WEGOVY) 1.7 MG/0.75ML  Self No No   Sig: Inject 0.75 mL (1.7 mg total) under the skin once a week   acetaZOLAMIDE (DIAMOX) 250 mg tablet  Self No No   Sig: Take 2 tablets (500 mg total) by  mouth 2 (two) times a day   almotriptan (AXERT) 12.5 MG tablet  Self Yes No   Sig: Take 12.5 mg by mouth once as needed for migraine may repeat in 2 hours if needed.  Limited to 2 x a week   cariprazine (VRAYLAR) 6 MG capsule  Self No No   Sig: Take 1 capsule (6 mg total) by mouth daily   celecoxib (CeleBREX) 200 mg capsule  Self No No   Sig: Take 1 capsule (200 mg total) by mouth 2 (two) times a day   cholecalciferol 1,000 units tablet  Self No No   Sig: Take 5 tablets (5,000 Units total) by mouth daily   escitalopram (LEXAPRO) 20 mg tablet  Self Yes No   Sig: Take 20 mg by mouth daily   furosemide (LASIX) 20 mg tablet  Self Yes No   Sig: Take 20 mg by mouth daily as needed   gabapentin (NEURONTIN) 600 MG tablet  Self No No   Sig: Take 1.5 tablets (900 mg total) by mouth daily at bedtime   gabapentin (NEURONTIN) 600 MG tablet   Yes No   Sig: Take 600 mg by mouth daily after breakfast   ipratropium (ATROVENT) 0.03 % nasal spray   No No   Si sprays into each nostril every 12 (twelve) hours   levothyroxine 112 mcg tablet  Self No No   Sig: Take 1 tablet (112 mcg total) by mouth daily in the early morning   linaCLOtide (Linzess) 290 MCG CAPS  Self No No   Sig: Take 1 capsule by mouth daily   lithium carbonate (LITHOBID) 450 mg CR tablet   No No   Sig: Take 1 tablet (450 mg total) by mouth 2 (two) times a day for 28 days   Patient taking differently: Take 450 mg by mouth 2 (two) times a day Patient is taking 1 tablet in the morning and 2 tablets in the evening.   loteprednol etabonate (LOTEMAX) 0.5 % ophthalmic suspension  Self Yes No   metFORMIN (GLUCOPHAGE) 500 mg tablet  Self No No   Sig: Take 1 tablet (500 mg total) by mouth daily with breakfast   nitrofurantoin (MACROBID) 100 mg capsule   No No   Sig: Take 1 capsule (100 mg total) by mouth 2 (two) times a day for 5 days   omeprazole (PriLOSEC) 20 mg delayed release capsule   No No   Sig: Take 1 capsule (20 mg total) by mouth 2 (two) times a day    prochlorperazine (COMPAZINE) 10 mg tablet  Self Yes No   Sig: Take 10 mg by mouth every 6 (six) hours as needed for nausea or vomiting Take 1 tablet by TID if needed for headache or nausea. Limit to 2x a week .   propranolol (INDERAL) 10 mg tablet  Self No No   Sig: Take 1 tablet (10 mg total) by mouth every 12 (twelve) hours   spironolactone (ALDACTONE) 25 mg tablet  Self No No   Sig: Take 1 tablet (25 mg total) by mouth daily   topiramate (TOPAMAX) 100 mg tablet  Self Yes No   Sig: TAKE 1.5 TABLETS BID      Facility-Administered Medications: None       Past Medical History:   Diagnosis Date    Allergic     Allergic rhinitis     Anorexia nervosa in remission     Anxiety     Arthritis 5/8/2014    Back pain     Bipolar disorder (HCC)     Depression     Dermatitis 10/16/2021    Disease of thyroid gland     Diverticulitis of colon 9/20/2015    Dizziness     Ear problems     Eating disorder     Esophageal reflux 08/15/2013    GERD (gastroesophageal reflux disease) 8/15/2013    Headache(784.0)     Headache, tension-type     Hypertension     Hypothyroid     Idiopathic intracranial hypertension     Lumbar degenerative disc disease 05/08/2014    Migraine     Nasal congestion     Nosebleed     Obesity     Obsessive-compulsive disorder     Otitis media     Overactive bladder     Psychiatric disorder     Restless leg syndrome, controlled     Seasonal allergies     Sinusitis     Sleep apnea     Sleep difficulties     Suicide and self-inflicted injury (HCC)     Tinnitus     TMJ dysfunction     Tonsillitis     Transcranial Magnetic Stimulation 09/06/2021    Urinary tract infection     Vision loss        Past Surgical History:   Procedure Laterality Date    DENTAL SURGERY      wisdom teeth-at [age] years. Other surgery dental extraxtion of bone spur (10 years ago)    IR LUMBAR PUNCTURE  02/26/2019    SINUS ENDOSCOPY      SINUS SURGERY      TONSILLECTOMY         Family History   Problem Relation Age of Onset    Mental illness  Mother     Depression Mother     Suicide Attempts Mother     Hypertension Mother     Diabetes Mother     Other Mother         bicuspid aortic valve    Anxiety disorder Mother     Psychiatric Illness Mother     Schizoaffective Disorder  Mother     Anemia Mother     Hypertension Father     Hypothyroidism Father     Thyroid disease Father     Hypertension Brother     Cancer Maternal Grandmother     Heart disease Maternal Grandmother     Stroke Maternal Grandmother     Breast cancer Maternal Grandmother     Skin cancer Maternal Grandmother     Arthritis Maternal Grandmother     Pneumonia Maternal Grandfather     Cancer Maternal Grandfather     Dementia Maternal Grandfather     COPD Maternal Grandfather     Cancer Paternal Grandmother     Pneumonia Paternal Grandfather     Arthritis Family     Breast cancer Family     Osteopenia Family     Osteoporosis Family     Hypertension Family     Transient ischemic attack Family      I have reviewed and agree with the history as documented.    E-Cigarette/Vaping    E-Cigarette Use Never User      E-Cigarette/Vaping Substances    Nicotine No     THC No     CBD No     Flavoring No     Other No     Unknown No      Social History     Tobacco Use    Smoking status: Never     Passive exposure: Never    Smokeless tobacco: Never   Vaping Use    Vaping status: Never Used   Substance Use Topics    Alcohol use: Never    Drug use: Never       Review of Systems   Constitutional:  Positive for fatigue. Negative for chills and fever.   HENT:  Negative for ear pain and sore throat.    Eyes:  Negative for pain and visual disturbance.   Respiratory:  Negative for cough and shortness of breath.    Cardiovascular:  Positive for chest pain. Negative for palpitations.   Gastrointestinal:  Positive for nausea. Negative for abdominal pain and vomiting.   Genitourinary:  Positive for frequency. Negative for dysuria and hematuria.   Musculoskeletal:  Negative for arthralgias and back pain.   Skin:   Negative for color change and rash.   Neurological:  Negative for seizures and syncope.   All other systems reviewed and are negative.      Physical Exam  Physical Exam  Constitutional:       General: She is not in acute distress.     Appearance: Normal appearance. She is well-developed. She is obese. She is not ill-appearing, toxic-appearing or diaphoretic.   HENT:      Right Ear: External ear normal. No swelling. Tympanic membrane is not bulging.      Left Ear: External ear normal. No swelling. Tympanic membrane is not bulging.      Nose: Nose normal.      Mouth/Throat:      Pharynx: No oropharyngeal exudate.   Eyes:      General: Lids are normal.      Conjunctiva/sclera: Conjunctivae normal.      Pupils: Pupils are equal, round, and reactive to light.   Neck:      Thyroid: No thyromegaly.      Vascular: No JVD.      Trachea: No tracheal deviation.   Cardiovascular:      Rate and Rhythm: Normal rate and regular rhythm.      Pulses: Normal pulses.      Heart sounds: Normal heart sounds. No murmur heard.     No friction rub. No gallop.   Pulmonary:      Effort: Pulmonary effort is normal. No respiratory distress.      Breath sounds: Normal breath sounds. No stridor. No wheezing or rales.   Chest:      Chest wall: No tenderness.   Abdominal:      General: Bowel sounds are normal. There is no distension.      Palpations: Abdomen is soft. There is no mass.      Tenderness: There is no abdominal tenderness. There is no guarding or rebound.      Hernia: No hernia is present.   Musculoskeletal:         General: Normal range of motion.      Cervical back: Normal range of motion and neck supple. No edema. Normal range of motion.   Lymphadenopathy:      Cervical: No cervical adenopathy.   Skin:     General: Skin is warm and dry.      Coloration: Skin is not pale.      Findings: No erythema or rash.   Neurological:      Mental Status: She is alert and oriented to person, place, and time.      GCS: GCS eye subscore is 4. GCS  verbal subscore is 5. GCS motor subscore is 6.      Cranial Nerves: No cranial nerve deficit.      Sensory: No sensory deficit.      Deep Tendon Reflexes: Reflexes are normal and symmetric.   Psychiatric:         Speech: Speech normal.         Behavior: Behavior normal.         Vital Signs  ED Triage Vitals [06/21/24 1558]   Temperature Pulse Respirations Blood Pressure SpO2   98 °F (36.7 °C) 66 18 150/85 97 %      Temp Source Heart Rate Source Patient Position - Orthostatic VS BP Location FiO2 (%)   Temporal Monitor Sitting Left arm --      Pain Score       No Pain           Vitals:    06/21/24 1730 06/21/24 1800 06/21/24 1900 06/21/24 1930   BP: 121/62 125/67 126/58 117/61   Pulse: (!) 51 (!) 50 (!) 51 (!) 52   Patient Position - Orthostatic VS:             Visual Acuity      ED Medications  Medications   sodium chloride 0.9 % bolus 1,000 mL (0 mL Intravenous Stopped 6/21/24 1750)       Diagnostic Studies  Results Reviewed       Procedure Component Value Units Date/Time    Lithium level [[id number]] Collected: 06/21/24 1825    Lab Status: In process Specimen: Blood from Arm, Right Updated: 06/21/24 1829    TSH [[id number]]  (Abnormal) Collected: 06/21/24 1648    Lab Status: Final result Specimen: Blood from Arm, Right Updated: 06/21/24 1727     TSH 3RD GENERATON 0.428 uIU/mL     HS Troponin 0hr (reflex protocol) [[id number]]  (Normal) Collected: 06/21/24 1648    Lab Status: Final result Specimen: Blood from Arm, Right Updated: 06/21/24 1718     hs TnI 0hr 3 ng/L     Comprehensive metabolic panel [[id number]]  (Abnormal) Collected: 06/21/24 1648    Lab Status: Final result Specimen: Blood from Arm, Right Updated: 06/21/24 1713     Sodium 136 mmol/L      Potassium 3.9 mmol/L      Chloride 110 mmol/L      CO2 18 mmol/L      ANION GAP 8 mmol/L      BUN 16 mg/dL      Creatinine 0.78 mg/dL      Glucose 94 mg/dL      Calcium 10.4 mg/dL      AST 11 U/L      ALT 12 U/L      Alkaline Phosphatase 95 U/L      Total Protein 6.7  g/dL      Albumin 4.3 g/dL      Total Bilirubin 0.28 mg/dL      eGFR 96 ml/min/1.73sq m     Narrative:      National Kid[employer] Foundation guidelines for Chronic Kidney Disease (CKD):     Stage 1 with normal or high GFR (GFR > 90 mL/min/1.73 square meters)    Stage 2 Mild CKD (GFR = 60-89 mL/min/1.73 square meters)    Stage 3A Moderate CKD (GFR = 45-59 mL/min/1.73 square meters)    Stage 3B Moderate CKD (GFR = 30-44 mL/min/1.73 square meters)    Stage 4 Severe CKD (GFR = 15-29 mL/min/1.73 square meters)    Stage 5 End Stage CKD (GFR <15 mL/min/1.73 square meters)  Note: GFR calculation is accurate only with a steady state creatinine    Salicylate level [[id number]]  (Normal) Collected: 06/21/24 1648    Lab Status: Final result Specimen: Blood from Arm, Right Updated: 06/21/24 1713     Salicylate Lvl <5 mg/dL     Acetaminophen level-If concentration is detectable, please discuss with medical  on call. [[id number]]  (Abnormal) Collected: 06/21/24 1648    Lab Status: Final result Specimen: Blood from Arm, Right Updated: 06/21/24 1713     Acetaminophen Level <2 ug/mL     Ethanol [[id number]]  (Normal) Collected: 06/21/24 1648    Lab Status: Final result Specimen: Blood from Arm, Right Updated: 06/21/24 1712     Ethanol Lvl <10 mg/dL     CBC and differential [[id number]]  (Abnormal) Collected: 06/21/24 1648    Lab Status: Final result Specimen: Blood from Arm, Right Updated: 06/21/24 1656     WBC 15.47 Thousand/uL      RBC 4.98 Million/uL      Hemoglobin 14.0 g/dL      Hematocrit 45.1 %      MCV 91 fL      MCH 28.1 pg      MCHC 31.0 g/dL      RDW 13.2 %      MPV 10.1 fL      Platelets 312 Thousands/uL      nRBC 0 /100 WBCs      Segmented % 68 %      Immature Grans % 0 %      Lymphocytes % 19 %      Monocytes % 7 %      Eosinophils Relative 5 %      Basophils Relative 1 %      Absolute Neutrophils 10.44 Thousands/µL      Absolute Immature Grans 0.05 Thousand/uL      Absolute Lymphocytes 2.97 Thousands/µL       Absolute Monocytes 1.12 Thousand/µL      Eosinophils Absolute 0.78 Thousand/µL      Basophils Absolute 0.11 Thousands/µL                    No orders to display              Procedures  ECG 12 Lead Documentation Only    Date/Time: 6/21/2024 4:44 PM    Performed by: Stewart Wu PA-C  Authorized by: Stewart Wu PA-C    Indications / Diagnosis:  Chest pain lithium toxicity  ECG reviewed by me, the ED Provider: yes    Patient location:  ED  Previous ECG:     Comparison to cardiac monitor: Yes    Interpretation:     Interpretation: normal    Rate:     ECG rate:  54    ECG rate assessment: bradycardic    Rhythm:     Rhythm: sinus rhythm    Ectopy:     Ectopy: none    QRS:     QRS axis:  Normal    QRS intervals:  Normal  Conduction:     Conduction: normal    ST segments:     ST segments:  Normal  T waves:     T waves: normal             ED Course  ED Course as of 06/21/24 2002 Fri Jun 21, 2024   1634 SpO2: 97 %   1634 Respirations: 18   1634 Pulse: 66   1634 Temperature: 98 °F (36.7 °C)   1634 Blood Pressure: 150/85  Vital signs reviewed within normal limits.   1711 White count has been fairly steadily elevated over the last number of weeks.  There is no anemia there is no thrombocytopenia.   1712 EKG was reviewed there is no QTc prolongation   1714 ACETAMINOPHEN LEVEL(!): <2   1714 SALICYLATE LEVEL: <5   1714 ETHANOL: <10   1902 Message sent to on-call toxicology             HEART Risk Score      Flowsheet Row Most Recent Value   Heart Score Risk Calculator    History 0 Filed at: 06/21/2024 1645   ECG 0 Filed at: 06/21/2024 1645   Age 0 Filed at: 06/21/2024 1645   Risk Factors 0 Filed at: 06/21/2024 1645   Troponin 0 Filed at: 06/21/2024 1645   HEART Score 0 Filed at: 06/21/2024 1645                          SBIRT 20yo+      Flowsheet Row Most Recent Value   Initial Alcohol Screen: US AUDIT-C     1. How often do you have a drink containing alcohol? 0 Filed at: 06/21/2024 1600   2. How many drinks  containing alcohol do you have on a typical day you are drinking?  0 Filed at: 06/21/2024 1600   3a. Male UNDER 65: How often do you have five or more drinks on one occasion? 0 Filed at: 06/21/2024 1600   3b. FEMALE Any Age, or MALE 65+: How often do you have 4 or more drinks on one occassion? 0 Filed at: 06/21/2024 1600   Audit-C Score 0 Filed at: 06/21/2024 1600   JENNIFER: How many times in the past year have you...    Used an illegal drug or used a prescription medication for non-medical reasons? Never Filed at: 06/21/2024 1600                      Medical Decision Making  38-year-old female here with concerns over elevated lithium levels.  See HPI for detailed history regarding lithium commencement as well as laboratory values.  Symptomatology includes nausea and generalized fatigue urinary frequency and occasional episodic chest tightness in the evening.  HPI discusses the onset of these.  Differential diagnosis includes acute lithium toxicity, acute coronary syndrome, thyroid abnormalities, renal dysfunction, electrolyte disturbance.    The lithium level is a send out and will be back till tomorrow.  Her renal function is normal.  She has stable leukocytosis of unknown cause.  She does have some mild bradycardia however upon review of charts from a month ago she was experiencing bradycardia band she is on propranolol 10 mg twice a day.  I advised her to cut back to half of the total dose daily hold the lithium level.  I did reach out to on-call toxicology Dr. Al who did review the case and agreed that the renal function is normal without any severe neurologic symptoms she is stable for discharge home.    Amount and/or Complexity of Data Reviewed  Labs: ordered. Decision-making details documented in ED Course.             Disposition  Final diagnoses:   Elevated lithium level     Time reflects when diagnosis was documented in both MDM as applicable and the Disposition within this note       Time User Action  Codes Description Comment    6/21/2024  7:28 PM Stewart Wu Add [R79.89] Elevated lithium level           ED Disposition       ED Disposition   Discharge    Condition   Stable    Date/Time   Fri Jun 21, 2024 1928    Comment   Gita Rendon discharge to home/self care.                   Follow-up Information       Follow up With Specialties Details Why Contact Info Additional Information    [facility] Emergency Medicine Go to  If symptoms worsen [address] [facility] [address]  [phone] [facility], [address], [place], [address]            Discharge Medication List as of 6/21/2024  7:31 PM        CONTINUE these medications which have NOT CHANGED    Details   acetaZOLAMIDE (DIAMOX) 250 mg tablet Take 2 tablets (500 mg total) by mouth 2 (two) times a day, Starting Wed 6/12/2024, Normal      almotriptan (AXERT) 12.5 MG tablet Take 12.5 mg by mouth once as needed for migraine may repeat in 2 hours if needed.  Limited to 2 x a week, Historical Med      Atogepant (Qulipta) 60 MG TABS Take 60 mg by mouth in the morning, Historical Med      cariprazine (VRAYLAR) 6 MG capsule Take 1 capsule (6 mg total) by mouth daily, Starting Wed 4/17/2024, Until Tue 6/4/2024, Normal      celecoxib (CeleBREX) 200 mg capsule Take 1 capsule (200 mg total) by mouth 2 (two) times a day, Starting Tue 4/16/2024, Normal      Cequa 0.09 % SOLN Apply 1 drop to eye 2 (two) times a day, Starting Sat 8/5/2023, Normal      cholecalciferol 1,000 units tablet Take 5 tablets (5,000 Units total) by mouth daily, Starting Tue 4/16/2024, Normal      escitalopram (LEXAPRO) 20 mg tablet Take 20 mg by mouth daily, Starting Wed 6/5/2024, Historical Med      furosemide (LASIX) 20 mg tablet Take 20 mg by mouth daily as needed, Historical Med      !! gabapentin (NEURONTIN) 600 MG tablet Take 1.5 tablets (900 mg total) by mouth daily at bedtime,  Starting Tue 4/16/2024, Normal      !! gabapentin (NEURONTIN) 600 MG tablet Take 600 mg by mouth daily after breakfast, Historical Med      ipratropium (ATROVENT) 0.03 % nasal spray 2 sprays into each nostril every 12 (twelve) hours, Starting Mon 6/17/2024, Normal      levothyroxine 112 mcg tablet Take 1 tablet (112 mcg total) by mouth daily in the early morning, Starting Mon 4/29/2024, Normal      linaCLOtide (Linzess) 290 MCG CAPS Take 1 capsule by mouth daily, Starting Wed 3/20/2024, Until Mon 9/16/2024, Normal      lithium carbonate (LITHOBID) 450 mg CR tablet Take 1 tablet (450 mg total) by mouth 2 (two) times a day for 28 days, Starting Tue 4/16/2024, Until Wed 6/12/2024, Normal      loteprednol etabonate (LOTEMAX) 0.5 % ophthalmic suspension Historical Med      metFORMIN (GLUCOPHAGE) 500 mg tablet Take 1 tablet (500 mg total) by mouth daily with breakfast, Starting Tue 4/16/2024, Normal      Mirabegron ER 25 MG TB24 Take 25 mg by mouth daily, Starting Mon 6/17/2024, Normal      Multiple Vitamin (MULTIVITAMIN) capsule Take 1 capsule by mouth daily, Historical Med      nitrofurantoin (MACROBID) 100 mg capsule Take 1 capsule (100 mg total) by mouth 2 (two) times a day for 5 days, Starting Mon 6/17/2024, Until Sat 6/22/2024, Normal      omeprazole (PriLOSEC) 20 mg delayed release capsule Take 1 capsule (20 mg total) by mouth 2 (two) times a day, Starting Mon 6/17/2024, Normal      prochlorperazine (COMPAZINE) 10 mg tablet Take 10 mg by mouth every 6 (six) hours as needed for nausea or vomiting Take 1 tablet by TID if needed for headache or nausea. Limit to 2x a week ., Historical Med      propranolol (INDERAL) 10 mg tablet Take 1 tablet (10 mg total) by mouth every 12 (twelve) hours, Starting Thu 6/13/2024, Normal      Semaglutide-Weight Management (WEGOVY) 1.7 MG/0.75ML Inject 0.75 mL (1.7 mg total) under the skin once a week, Starting Tue 6/11/2024, Normal      spironolactone (ALDACTONE) 25 mg tablet Take 1  tablet (25 mg total) by mouth daily, Starting Tue 4/16/2024, Normal      topiramate (TOPAMAX) 100 mg tablet TAKE 1.5 TABLETS BID, Starting Sat 6/1/2024, Historical Med       !! - Potential duplicate medications found. Please discuss with provider.          No discharge procedures on file.    PDMP Review         Value Time User    PDMP Reviewed  Yes 4/16/2024  2:32 PM LEIGHA Kaiser            ED Provider  Electronically Signed by             Stewart Wu PA-C  06/21/24 2002

## 2024-06-22 LAB — LITHIUM SERPL-SCNC: 1.62 MMOL/L (ref 0.6–1.2)

## 2024-06-24 ENCOUNTER — APPOINTMENT (OUTPATIENT)
Dept: LAB | Facility: CLINIC | Age: 39
End: 2024-06-24
Payer: COMMERCIAL

## 2024-06-24 ENCOUNTER — TELEPHONE (OUTPATIENT)
Dept: FAMILY MEDICINE CLINIC | Facility: CLINIC | Age: 39
End: 2024-06-24

## 2024-06-24 DIAGNOSIS — F33.2 SEVERE RECURRENT MAJOR DEPRESSION WITHOUT PSYCHOTIC FEATURES (HCC): ICD-10-CM

## 2024-06-24 DIAGNOSIS — R35.0 URINARY FREQUENCY: ICD-10-CM

## 2024-06-24 DIAGNOSIS — E66.01 MORBID (SEVERE) OBESITY DUE TO EXCESS CALORIES (HCC): ICD-10-CM

## 2024-06-24 DIAGNOSIS — Z79.899 ENCOUNTER FOR LONG-TERM (CURRENT) USE OF OTHER MEDICATIONS: ICD-10-CM

## 2024-06-24 DIAGNOSIS — F50.81 BINGE-EATING DISORDER, SEVERE: ICD-10-CM

## 2024-06-24 DIAGNOSIS — R73.01 IFG (IMPAIRED FASTING GLUCOSE): ICD-10-CM

## 2024-06-24 DIAGNOSIS — R63.5 ABNORMAL WEIGHT GAIN: ICD-10-CM

## 2024-06-24 DIAGNOSIS — Z51.81 ENCOUNTER FOR THERAPEUTIC DRUG MONITORING: ICD-10-CM

## 2024-06-24 LAB — LITHIUM SERPL-SCNC: 0.42 MMOL/L (ref 0.6–1.2)

## 2024-06-24 PROCEDURE — 87086 URINE CULTURE/COLONY COUNT: CPT | Performed by: EMERGENCY MEDICINE

## 2024-06-24 PROCEDURE — 36415 COLL VENOUS BLD VENIPUNCTURE: CPT

## 2024-06-24 PROCEDURE — 80178 ASSAY OF LITHIUM: CPT

## 2024-06-24 NOTE — TELEPHONE ENCOUNTER
I reviewed a note patient sent and called the patient to inform her that we are waiting on the paperwork to be faxed into the office.  Patient understood.

## 2024-06-25 DIAGNOSIS — F50.81 BINGE-EATING DISORDER, SEVERE: ICD-10-CM

## 2024-06-25 DIAGNOSIS — R73.01 IFG (IMPAIRED FASTING GLUCOSE): ICD-10-CM

## 2024-06-25 DIAGNOSIS — R63.5 ABNORMAL WEIGHT GAIN: ICD-10-CM

## 2024-06-25 DIAGNOSIS — E66.01 MORBID (SEVERE) OBESITY DUE TO EXCESS CALORIES (HCC): ICD-10-CM

## 2024-06-25 LAB
ATRIAL RATE: 49 BPM
BACTERIA UR CULT: NORMAL
P AXIS: 53 DEGREES
PR INTERVAL: 194 MS
QRS AXIS: 22 DEGREES
QRSD INTERVAL: 86 MS
QT INTERVAL: 426 MS
QTC INTERVAL: 384 MS
T WAVE AXIS: 43 DEGREES
VENTRICULAR RATE: 49 BPM

## 2024-06-25 PROCEDURE — 93010 ELECTROCARDIOGRAM REPORT: CPT | Performed by: INTERNAL MEDICINE

## 2024-06-26 ENCOUNTER — OFFICE VISIT (OUTPATIENT)
Dept: GASTROENTEROLOGY | Facility: CLINIC | Age: 39
End: 2024-06-26
Payer: COMMERCIAL

## 2024-06-26 VITALS
OXYGEN SATURATION: 97 % | RESPIRATION RATE: 16 BRPM | BODY MASS INDEX: 45.99 KG/M2 | TEMPERATURE: 98.4 F | SYSTOLIC BLOOD PRESSURE: 114 MMHG | HEIGHT: 67 IN | DIASTOLIC BLOOD PRESSURE: 78 MMHG | WEIGHT: 293 LBS | HEART RATE: 57 BPM

## 2024-06-26 DIAGNOSIS — K21.9 GASTROESOPHAGEAL REFLUX DISEASE, UNSPECIFIED WHETHER ESOPHAGITIS PRESENT: ICD-10-CM

## 2024-06-26 DIAGNOSIS — K59.09 CHRONIC CONSTIPATION: Primary | ICD-10-CM

## 2024-06-26 DIAGNOSIS — K62.5 BRBPR (BRIGHT RED BLOOD PER RECTUM): ICD-10-CM

## 2024-06-26 PROCEDURE — 99203 OFFICE O/P NEW LOW 30 MIN: CPT | Performed by: PHYSICIAN ASSISTANT

## 2024-06-26 RX ORDER — CETIRIZINE HYDROCHLORIDE 10 MG/1
10 TABLET, CHEWABLE ORAL DAILY
Status: ON HOLD | COMMUNITY

## 2024-06-26 RX ORDER — HYDROCORTISONE 25 MG/G
CREAM TOPICAL 2 TIMES DAILY
Qty: 30 G | Refills: 5 | Status: ON HOLD | OUTPATIENT
Start: 2024-06-26

## 2024-06-26 NOTE — PROGRESS NOTES
[facility] - Outpatient Consultation  Gita Rendon 38 y.o. female MRN: [id number]  Encounter: [id number]    ASSESSMENT AND PLAN:      1. Chronic constipation  2. BRBPR (bright red blood per rectum)    Patient with a history of chronic constipation and very infrequent BRBPR is here today to follow-up on her stool output.  She is on linaclotide 2 90 and has restarted a stool softener to help promote soft easy passage of stool.  We did review the question of efficacy of use of stool softeners, though if she finds it helpful it is safe to continue.  I would also recommend she add in MiraLAX to her regimen, 17 g daily, titrated/taper to effect.  Pertaining to intermittent BRBPR which she experienced in the past, I did offer a rectal exam today though patient declined.  We did review that commonly in the setting of constipation this may represent outlet bleeding such as anorectal irritation, hemorrhoid, fissure, etc.  She had a colonoscopy in 2021 which was macroscopically unremarkable with no anorectal pathology.  She is average risk with no family history.    At this time, patient prefers to monitor her stool output and for recurrence of rectal bleeding.  She was given a topical ointment to trial to see if this helps with symptoms, as the suppositories were cost prohibitive.  If her rectal bleeding recurs or progresses, I would have a low threshold to recommend repeat colonoscopy.    - hydrocortisone (ANUSOL-HC) 2.5 % rectal cream; Apply topically 2 (two) times a day  Dispense: 30 g; Refill: 5    3. Gastroesophageal reflux disease, unspecified whether esophagitis present    History of reflux, patient is on PPI twice daily with adequate control of symptoms.  Continue small frequent meals and diet and lifestyle modifications for GERD.  No alarm features to the upper GI tract at this time that would warrant endoscopic evaluation.    We will follow up in 6 months to reassess symptoms.    ______________________________________________________________________    HPI: Patient is a 38 y.o. female who presents today for a consultation regarding constipation. Pmhx sig for GERD, HTN, HLD, hypothyroidism, CAIO, migraine, bipolar disorder.     Patient is new to me.  She saw GI back in 2020 for chronic constipation.  Her last colonoscopy was in 10/2021 and this demonstrated normal mucosa.    At today's office visit, she feels her GI symptoms are overall stable.  She is on Linzess daily.  She started taking a stool softener for constipation as well and at present she is having a bowel movement just about every other day.  She is needing to strain at times and notes her stool is primarily a [place] 3, sometimes a 4.  She notes a scant amount of BRBPR in the bowl after defecation.  She shares she had this historically following her colonoscopy, and was given suppositories for suspected hemorrhoids.  She denies any nocturnal BMs.  No abdominal pain or rectal pain related to defecation.  No unintentional weight loss, she is on Wegovy and is actively working on weight loss, has lost 100 pounds so far.    She denies any heartburn, indigestion, nausea, vomiting, dysphagia or odynophagia.  Her appetite is less than was originally.  Initially when she started Wegovy she had some nausea, though this has resolved.    No family hx of CRC.    NSAIDs: celebrex   Etoh: noen   Tobacco: none      05/2024: CT A/P: wnl   06/2024: Hb 14.0, MCV 91, Plt 312, BUN 16, Cr 0.78, AST 11, ALT 12, ALP 95, albumin 4.3, t bili 0.28, TSH 0.428     Endoscopic history:   EGD:   Colon: 10/2021: normal     Review of Systems   Constitutional:  Negative for fever.   HENT:  Negative for trouble swallowing.    Gastrointestinal:  Positive for abdominal pain and constipation. Negative for diarrhea, nausea and vomiting.   Genitourinary:  Positive for frequency. Negative for dysuria and hematuria.   Musculoskeletal:  Negative for arthralgias and  myalgias.   Neurological:  Positive for headaches.   Otherwise Per HPI    Historical Information   Past Medical History:   Diagnosis Date    Allergic     Allergic rhinitis     Anorexia nervosa in remission     Anxiety     Arthritis 5/8/2014    Back pain     Bipolar disorder (HCC)     Depression     Dermatitis 10/16/2021    Disease of thyroid gland     Diverticulitis of colon 9/20/2015    Dizziness     Ear problems     Eating disorder     Esophageal reflux 08/15/2013    GERD (gastroesophageal reflux disease) 8/15/2013    Headache(784.0)     Headache, tension-type     Hypertension     Hypothyroid     Idiopathic intracranial hypertension     Lumbar degenerative disc disease 05/08/2014    Migraine     Nasal congestion     Nosebleed     Obesity     Obsessive-compulsive disorder     Otitis media     Overactive bladder     Psychiatric disorder     Restless leg syndrome, controlled     Seasonal allergies     Sinusitis     Sleep apnea     Sleep difficulties     Suicide and self-inflicted injury (HCC)     Tinnitus     TMJ dysfunction     Tonsillitis     Transcranial Magnetic Stimulation 09/06/2021    Urinary tract infection     Vision loss      Past Surgical History:   Procedure Laterality Date    DENTAL SURGERY      wisdom teeth-at [age] years. Other surgery dental extraxtion of bone spur (10 years ago)    IR LUMBAR PUNCTURE  02/26/2019    SINUS ENDOSCOPY      SINUS SURGERY      TONSILLECTOMY       Social History   Social History     Substance and Sexual Activity   Alcohol Use Never     Social History     Substance and Sexual Activity   Drug Use Never     Social History     Tobacco Use   Smoking Status Never    Passive exposure: Never   Smokeless Tobacco Never     Family History   Problem Relation Age of Onset    Mental illness Mother     Depression Mother     Suicide Attempts Mother     Hypertension Mother     Diabetes Mother     Other Mother         bicuspid aortic valve    Anxiety disorder Mother     Psychiatric Illness  Mother     Schizoaffective Disorder  Mother     Anemia Mother     Hypertension Father     Hypothyroidism Father     Thyroid disease Father     Hypertension Brother     Cancer Maternal Grandmother     Heart disease Maternal Grandmother     Stroke Maternal Grandmother     Breast cancer Maternal Grandmother     Skin cancer Maternal Grandmother     Arthritis Maternal Grandmother     Pneumonia Maternal Grandfather     Cancer Maternal Grandfather     Dementia Maternal Grandfather     COPD Maternal Grandfather     Cancer Paternal Grandmother     Pneumonia Paternal Grandfather     Arthritis Family     Breast cancer Family     Osteopenia Family     Osteoporosis Family     Hypertension Family     Transient ischemic attack Family        Meds/Allergies       Current Outpatient Medications:     acetaZOLAMIDE (DIAMOX) 250 mg tablet    almotriptan (AXERT) 12.5 MG tablet    Atogepant (Qulipta) 60 MG TABS    cariprazine (VRAYLAR) 6 MG capsule    celecoxib (CeleBREX) 200 mg capsule    Cequa 0.09 % SOLN    cetirizine (ZyrTEC) 10 MG chewable tablet    cholecalciferol 1,000 units tablet    escitalopram (LEXAPRO) 20 mg tablet    furosemide (LASIX) 20 mg tablet    gabapentin (NEURONTIN) 600 MG tablet    gabapentin (NEURONTIN) 600 MG tablet    hydrocortisone (ANUSOL-HC) 2.5 % rectal cream    levothyroxine 112 mcg tablet    linaCLOtide (Linzess) 290 MCG CAPS    metFORMIN (GLUCOPHAGE) 500 mg tablet    Mirabegron ER 25 MG TB24    Multiple Vitamin (MULTIVITAMIN) capsule    omeprazole (PriLOSEC) 20 mg delayed release capsule    prochlorperazine (COMPAZINE) 10 mg tablet    propranolol (INDERAL) 10 mg tablet    Semaglutide-Weight Management (WEGOVY) 1.7 MG/0.75ML    spironolactone (ALDACTONE) 25 mg tablet    topiramate (TOPAMAX) 100 mg tablet    ipratropium (ATROVENT) 0.03 % nasal spray    lithium carbonate (LITHOBID) 450 mg CR tablet    loteprednol etabonate (LOTEMAX) 0.5 % ophthalmic suspension    Allergies   Allergen Reactions    Doxycycline   "    Pt refuses d/t remote H/O intracranial hypertension     Other Other (See Comments)     Seasonal allergies     Objective     Blood pressure 114/78, pulse 57, temperature 98.4 °F (36.9 °C), temperature source Temporal, resp. rate 16, height 5' 7\" (1.702 m), weight (!) 148 kg (325 lb 3.2 oz), SpO2 97%. Body mass index is 50.93 kg/m².    Physical Exam  Vitals and nursing note reviewed.   Constitutional:       General: She is not in acute distress.     Appearance: She is well-developed. She is obese.   HENT:      Head: Normocephalic and atraumatic.   Eyes:      Conjunctiva/sclera: Conjunctivae normal.   Cardiovascular:      Rate and Rhythm: Normal rate.      Heart sounds: No murmur heard.  Pulmonary:      Effort: Pulmonary effort is normal. No respiratory distress.      Breath sounds: Normal breath sounds.   Abdominal:      Palpations: Abdomen is soft.      Tenderness: There is no abdominal tenderness.   Genitourinary:     Comments: deferred  Skin:     General: Skin is warm and dry.      Coloration: Skin is not jaundiced.   Neurological:      General: No focal deficit present.      Mental Status: She is alert and oriented to person, place, and time.   Psychiatric:         Mood and Affect: Mood normal.        Lab Results:   No visits with results within 1 Day(s) from this visit.   Latest known visit with results is:   Appointment on 06/24/2024   Component Date Value    Lithium Lvl 06/24/2024 0.42 (L)      Radiology Results:   No results found.    Nereyda Hightower PA-C    **Please note:  Dictation voice to text software may have been used in the creation of this record.  Occasional wrong word or “sound alike” substitutions may have occurred due to the inherent limitations of voice recognition software.  Read the chart carefully and recognize, using context, where substitutions have occurred.**  "

## 2024-06-26 NOTE — PATIENT INSTRUCTIONS
Stay on Linzess to 90 every morning.  You can take the stool softener docusate/Colace twice daily.  I would recommend we add in some MiraLAX, you can even take as little as one half capful every day up to 2 capfuls a day if you need it.  The goal is that you are having soft easy to evacuate stools with no straining and no bleeding.  You can use the ointment/cream to apply to your bottom to see if this helps with the rectal bleeding.  If the rectal bleeding persist we should consider another colonoscopy.    Stay on the omeprazole twice a day.

## 2024-06-28 PROCEDURE — 93010 ELECTROCARDIOGRAM REPORT: CPT | Performed by: INTERNAL MEDICINE

## 2024-07-03 ENCOUNTER — HOSPITAL ENCOUNTER (EMERGENCY)
Facility: HOSPITAL | Age: 39
End: 2024-07-04
Attending: EMERGENCY MEDICINE
Payer: COMMERCIAL

## 2024-07-03 DIAGNOSIS — F32.A DEPRESSION: Primary | ICD-10-CM

## 2024-07-03 DIAGNOSIS — R79.89 DECREASED THYROID STIMULATING HORMONE (TSH) LEVEL: ICD-10-CM

## 2024-07-03 DIAGNOSIS — Z00.8 MEDICAL CLEARANCE FOR PSYCHIATRIC ADMISSION: ICD-10-CM

## 2024-07-03 LAB
ALBUMIN SERPL BCG-MCNC: 4.2 G/DL (ref 3.5–5)
ALP SERPL-CCNC: 78 U/L (ref 34–104)
ALT SERPL W P-5'-P-CCNC: 16 U/L (ref 7–52)
AMPHETAMINES SERPL QL SCN: NEGATIVE
ANION GAP SERPL CALCULATED.3IONS-SCNC: 9 MMOL/L (ref 4–13)
AST SERPL W P-5'-P-CCNC: 13 U/L (ref 13–39)
BARBITURATES UR QL: NEGATIVE
BASOPHILS # BLD AUTO: 0.07 THOUSANDS/ÂΜL (ref 0–0.1)
BASOPHILS NFR BLD AUTO: 1 % (ref 0–1)
BENZODIAZ UR QL: NEGATIVE
BILIRUB SERPL-MCNC: 0.27 MG/DL (ref 0.2–1)
BUN SERPL-MCNC: 20 MG/DL (ref 5–25)
CALCIUM SERPL-MCNC: 9.7 MG/DL (ref 8.4–10.2)
CHLORIDE SERPL-SCNC: 112 MMOL/L (ref 96–108)
CO2 SERPL-SCNC: 19 MMOL/L (ref 21–32)
COCAINE UR QL: NEGATIVE
CREAT SERPL-MCNC: 0.8 MG/DL (ref 0.6–1.3)
EOSINOPHIL # BLD AUTO: 0.25 THOUSAND/ÂΜL (ref 0–0.61)
EOSINOPHIL NFR BLD AUTO: 2 % (ref 0–6)
ERYTHROCYTE [DISTWIDTH] IN BLOOD BY AUTOMATED COUNT: 13.3 % (ref 11.6–15.1)
ETHANOL EXG-MCNC: 0 MG/DL
FENTANYL UR QL SCN: NEGATIVE
GFR SERPL CREATININE-BSD FRML MDRD: 93 ML/MIN/1.73SQ M
GLUCOSE SERPL-MCNC: 92 MG/DL (ref 65–140)
HCT VFR BLD AUTO: 45.8 % (ref 34.8–46.1)
HGB BLD-MCNC: 14.2 G/DL (ref 11.5–15.4)
HYDROCODONE UR QL SCN: NEGATIVE
IMM GRANULOCYTES # BLD AUTO: 0.04 THOUSAND/UL (ref 0–0.2)
IMM GRANULOCYTES NFR BLD AUTO: 0 % (ref 0–2)
LITHIUM SERPL-SCNC: <0.1 MMOL/L (ref 0.6–1.2)
LYMPHOCYTES # BLD AUTO: 2.52 THOUSANDS/ÂΜL (ref 0.6–4.47)
LYMPHOCYTES NFR BLD AUTO: 24 % (ref 14–44)
MCH RBC QN AUTO: 28.4 PG (ref 26.8–34.3)
MCHC RBC AUTO-ENTMCNC: 31 G/DL (ref 31.4–37.4)
MCV RBC AUTO: 92 FL (ref 82–98)
METHADONE UR QL: NEGATIVE
MONOCYTES # BLD AUTO: 0.72 THOUSAND/ÂΜL (ref 0.17–1.22)
MONOCYTES NFR BLD AUTO: 7 % (ref 4–12)
NEUTROPHILS # BLD AUTO: 6.95 THOUSANDS/ÂΜL (ref 1.85–7.62)
NEUTS SEG NFR BLD AUTO: 66 % (ref 43–75)
NRBC BLD AUTO-RTO: 0 /100 WBCS
OPIATES UR QL SCN: NEGATIVE
OXYCODONE+OXYMORPHONE UR QL SCN: NEGATIVE
PCP UR QL: NEGATIVE
PLATELET # BLD AUTO: 331 THOUSANDS/UL (ref 149–390)
PMV BLD AUTO: 9.6 FL (ref 8.9–12.7)
POTASSIUM SERPL-SCNC: 3.7 MMOL/L (ref 3.5–5.3)
PROT SERPL-MCNC: 7 G/DL (ref 6.4–8.4)
RBC # BLD AUTO: 5 MILLION/UL (ref 3.81–5.12)
SODIUM SERPL-SCNC: 140 MMOL/L (ref 135–147)
T4 FREE SERPL-MCNC: 0.73 NG/DL (ref 0.61–1.12)
THC UR QL: NEGATIVE
TSH SERPL DL<=0.05 MIU/L-ACNC: 0.41 UIU/ML (ref 0.45–4.5)
WBC # BLD AUTO: 10.55 THOUSAND/UL (ref 4.31–10.16)

## 2024-07-03 PROCEDURE — 36415 COLL VENOUS BLD VENIPUNCTURE: CPT | Performed by: PHYSICIAN ASSISTANT

## 2024-07-03 PROCEDURE — 80053 COMPREHEN METABOLIC PANEL: CPT | Performed by: PHYSICIAN ASSISTANT

## 2024-07-03 PROCEDURE — 80307 DRUG TEST PRSMV CHEM ANLYZR: CPT | Performed by: PHYSICIAN ASSISTANT

## 2024-07-03 PROCEDURE — 84443 ASSAY THYROID STIM HORMONE: CPT | Performed by: PHYSICIAN ASSISTANT

## 2024-07-03 PROCEDURE — 82075 ASSAY OF BREATH ETHANOL: CPT | Performed by: PHYSICIAN ASSISTANT

## 2024-07-03 PROCEDURE — 99285 EMERGENCY DEPT VISIT HI MDM: CPT | Performed by: PHYSICIAN ASSISTANT

## 2024-07-03 PROCEDURE — 80178 ASSAY OF LITHIUM: CPT | Performed by: PHYSICIAN ASSISTANT

## 2024-07-03 PROCEDURE — 84439 ASSAY OF FREE THYROXINE: CPT | Performed by: PHYSICIAN ASSISTANT

## 2024-07-03 PROCEDURE — 85025 COMPLETE CBC W/AUTO DIFF WBC: CPT | Performed by: PHYSICIAN ASSISTANT

## 2024-07-03 NOTE — ED NOTES
Crisis met with pt and discussed Crisis Intake and Safety Risk Assessment. Introduce self, role, and evaluation process. Pt is a 38 year old female who presents in ED with complaints of worsening depression and anxiety.  Pt states for the past several months she has been having an increase in sadness, feeling lonely, not wanting to get out of bed. She was seen by her psychiatrist at [facility] two weeks ago.  Her Lexapro medication was discontinued, and her doctor prescribe Vybrid.  Patient states last in patient admission was in April 2024 and was admitted at [facility].  She denies suicidal or homicidal thoughts, auditory or visual hallucination or thoughts to self harm. Pt also denies drug and alcohol use. Pt is requesting in patient admission.  Crisis discussed treatment options. Pt agreeable to signing 201 after rights and 72 hour noticed were discussed. Pt and ED Provider signed 201, sent to Intake for inpatient bed review.

## 2024-07-03 NOTE — ED PROVIDER NOTES
History  Chief Complaint   Patient presents with    Depression     Patient reports having depression over the past few months but gotten worse within the last few days. Reports looking for inpatient treatment.     38 year old female with PMH anxiety, depression, OCD, bipolar disorder, HTN, GERD, obesity, migraines presenting ambulatory from home for evaluation of depression.  Pt reports she has been dealing with depression for quite some time.  She feels it has gotten worse since last October.  She reports she went inpatient psych in April and was there for about 2 weeks but feels maybe this wasn't long enough.  She reports she has been seeing her therapist and her psychiatrist has adjusted her meds without relief.  She reports she was on lithium but her levels were high and she was taken off this medication.  She lives at home by herself.  She reports she just filed for bankruptcy.  She reports she had a 10 year old senior special needs dog but today took it to a shelter as she states she is unable to care for it or care for it financially.  She reports her dad  in  due to covid.  She notes her mom struggles with mental illness and is currently living in a personal care home but is not herself and doesn't care about anyone.  She states she is depressed.  She hasn't been eating much (believes weight loss meds play a role with this too).  She reports she just wants to sleep all the time.  She reports no interest in doing anything.  She notes she will go days without taking a bath. She states she is tired and just cries all the time.  She has had anxiety as well.  She reports experiencing panic attacks.  She feels she needs to go back for inpatient treatment.  She denies fever, chills, cough, congestion or recent illness.  Denies chest pain, SOB, N/V/D, abdominal pain.  No reported urinary complaints.  She notes a couple weeks ago when lithium level was elevated, it was rechecked after discontinuing the  medication with improvement of the level.  She denies any suicidal ideation or attempt.  No homicidal ideation.  No hallucinations.  She denies taking any excess of her medications.  She denies any ingestion of drugs or illicit substances.        History provided by:  Medical records and patient   used: No    Depression  Presenting symptoms: depression    Presenting symptoms: no hallucinations, no homicidal ideas, no self-mutilation and no suicide attempt    Chronicity:  Chronic  Treatment compliance:  Most of the time  Relieved by:  Nothing  Worsened by:  Nothing  Associated symptoms: anxiety, appetite change, fatigue and hypersomnia    Associated symptoms: no abdominal pain, no chest pain and no headaches    Risk factors: family hx of mental illness and hx of mental illness        Prior to Admission Medications   Prescriptions Last Dose Informant Patient Reported? Taking?   Atogepant (Qulipta) 60 MG TABS  Self Yes No   Sig: Take 60 mg by mouth in the morning   Cequa 0.09 % SOLN  Self No No   Sig: Apply 1 drop to eye 2 (two) times a day   Mirabegron ER 25 MG TB24   No No   Sig: Take 25 mg by mouth daily   Multiple Vitamin (MULTIVITAMIN) capsule  Self Yes No   Sig: Take 1 capsule by mouth daily   Semaglutide-Weight Management (WEGOVY) 1.7 MG/0.75ML  Self No No   Sig: Inject 0.75 mL (1.7 mg total) under the skin once a week   acetaZOLAMIDE (DIAMOX) 250 mg tablet  Self No No   Sig: Take 2 tablets (500 mg total) by mouth 2 (two) times a day   almotriptan (AXERT) 12.5 MG tablet  Self Yes No   Sig: Take 12.5 mg by mouth once as needed for migraine may repeat in 2 hours if needed.  Limited to 2 x a week   cariprazine (VRAYLAR) 6 MG capsule  Self No No   Sig: Take 1 capsule (6 mg total) by mouth daily   celecoxib (CeleBREX) 200 mg capsule  Self No No   Sig: Take 1 capsule (200 mg total) by mouth 2 (two) times a day   cetirizine (ZyrTEC) 10 MG chewable tablet  Self Yes No   Sig: Chew 10 mg daily    cholecalciferol 1,000 units tablet  Self No No   Sig: Take 5 tablets (5,000 Units total) by mouth daily   escitalopram (LEXAPRO) 20 mg tablet  Self Yes No   Sig: Take 20 mg by mouth daily   furosemide (LASIX) 20 mg tablet  Self Yes No   Sig: Take 20 mg by mouth daily as needed   gabapentin (NEURONTIN) 600 MG tablet  Self No No   Sig: Take 1.5 tablets (900 mg total) by mouth daily at bedtime   gabapentin (NEURONTIN) 600 MG tablet   Yes No   Sig: Take 600 mg by mouth daily after breakfast   hydrocortisone (ANUSOL-HC) 2.5 % rectal cream   No No   Sig: Apply topically 2 (two) times a day   ipratropium (ATROVENT) 0.03 % nasal spray   No No   Si sprays into each nostril every 12 (twelve) hours   Patient not taking: Reported on 2024   levothyroxine 112 mcg tablet  Self No No   Sig: Take 1 tablet (112 mcg total) by mouth daily in the early morning   linaCLOtide (Linzess) 290 MCG CAPS  Self No No   Sig: Take 1 capsule by mouth daily   lithium carbonate (LITHOBID) 450 mg CR tablet   No No   Sig: Take 1 tablet (450 mg total) by mouth 2 (two) times a day for 28 days   Patient taking differently: Take 450 mg by mouth 2 (two) times a day Patient is taking 1 tablet in the morning and 2 tablets in the evening.   loteprednol etabonate (LOTEMAX) 0.5 % ophthalmic suspension  Self Yes No   Patient not taking: Reported on 2024   metFORMIN (GLUCOPHAGE) 500 mg tablet   No No   Sig: Take 1 tablet (500 mg total) by mouth daily with breakfast   metFORMIN (GLUCOPHAGE) 500 mg tablet   No No   Sig: Take 1 tablet (500 mg total) by mouth daily with breakfast   omeprazole (PriLOSEC) 20 mg delayed release capsule   No No   Sig: Take 1 capsule (20 mg total) by mouth 2 (two) times a day   prochlorperazine (COMPAZINE) 10 mg tablet  Self Yes No   Sig: Take 10 mg by mouth every 6 (six) hours as needed for nausea or vomiting Take 1 tablet by TID if needed for headache or nausea. Limit to 2x a week .   propranolol (INDERAL) 10 mg tablet   Self No No   Sig: Take 1 tablet (10 mg total) by mouth every 12 (twelve) hours   spironolactone (ALDACTONE) 25 mg tablet  Self No No   Sig: Take 1 tablet (25 mg total) by mouth daily   topiramate (TOPAMAX) 100 mg tablet  Self Yes No   Sig: TAKE 1.5 TABLETS BID      Facility-Administered Medications: None       Past Medical History:   Diagnosis Date    Allergic     Allergic rhinitis     Anorexia nervosa in remission     Anxiety     Arthritis 5/8/2014    Back pain     Bipolar disorder (HCC)     Depression     Dermatitis 10/16/2021    Disease of thyroid gland     Diverticulitis of colon 9/20/2015    Dizziness     Ear problems     Eating disorder     Esophageal reflux 08/15/2013    GERD (gastroesophageal reflux disease) 8/15/2013    Headache(784.0)     Headache, tension-type     Hypertension     Hypothyroid     Idiopathic intracranial hypertension     Lumbar degenerative disc disease 05/08/2014    Migraine     Nasal congestion     Nosebleed     Obesity     Obsessive-compulsive disorder     Otitis media     Overactive bladder     Psychiatric disorder     Restless leg syndrome, controlled     Seasonal allergies     Sinusitis     Sleep apnea     Sleep difficulties     Suicide and self-inflicted injury (HCC)     Tinnitus     TMJ dysfunction     Tonsillitis     Transcranial Magnetic Stimulation 09/06/2021    Urinary tract infection     Vision loss        Past Surgical History:   Procedure Laterality Date    DENTAL SURGERY      wisdom teeth-at [age] years. Other surgery dental extraxtion of bone spur (10 years ago)    IR LUMBAR PUNCTURE  02/26/2019    SINUS ENDOSCOPY      SINUS SURGERY      TONSILLECTOMY         Family History   Problem Relation Age of Onset    Mental illness Mother     Depression Mother     Suicide Attempts Mother     Hypertension Mother     Diabetes Mother     Other Mother         bicuspid aortic valve    Anxiety disorder Mother     Psychiatric Illness Mother     Schizoaffective Disorder  Mother     Anemia  Mother     Hypertension Father     Hypothyroidism Father     Thyroid disease Father     Hypertension Brother     Cancer Maternal Grandmother     Heart disease Maternal Grandmother     Stroke Maternal Grandmother     Breast cancer Maternal Grandmother     Skin cancer Maternal Grandmother     Arthritis Maternal Grandmother     Pneumonia Maternal Grandfather     Cancer Maternal Grandfather     Dementia Maternal Grandfather     COPD Maternal Grandfather     Cancer Paternal Grandmother     Pneumonia Paternal Grandfather     Arthritis Family     Breast cancer Family     Osteopenia Family     Osteoporosis Family     Hypertension Family     Transient ischemic attack Family      I have reviewed and agree with the history as documented.    E-Cigarette/Vaping    E-Cigarette Use Never User      E-Cigarette/Vaping Substances    Nicotine No     THC No     CBD No     Flavoring No     Other No     Unknown No      Social History     Tobacco Use    Smoking status: Never     Passive exposure: Never    Smokeless tobacco: Never   Vaping Use    Vaping status: Never Used   Substance Use Topics    Alcohol use: Never    Drug use: Never       Review of Systems   Constitutional:  Positive for appetite change and fatigue. Negative for chills and fever.   HENT: Negative.  Negative for congestion, rhinorrhea and sore throat.    Eyes: Negative.  Negative for visual disturbance.   Respiratory: Negative.  Negative for cough, shortness of breath and wheezing.    Cardiovascular: Negative.  Negative for chest pain, palpitations and leg swelling.   Gastrointestinal: Negative.  Negative for abdominal pain, constipation, diarrhea, nausea and vomiting.   Genitourinary: Negative.  Negative for dysuria, flank pain, frequency and hematuria.   Musculoskeletal: Negative.  Negative for back pain, myalgias and neck pain.   Skin: Negative.  Negative for rash.   Neurological: Negative.  Negative for dizziness, syncope, speech difficulty, light-headedness and  headaches.   Psychiatric/Behavioral:  Positive for depression and dysphoric mood. Negative for confusion, hallucinations, homicidal ideas and self-injury. The patient is nervous/anxious.    All other systems reviewed and are negative.      Physical Exam  Physical Exam  Vitals and nursing note reviewed.   Constitutional:       General: She is awake. She is not in acute distress.     Appearance: She is well-developed. She is obese. She is not toxic-appearing.   HENT:      Head: Normocephalic and atraumatic.      Right Ear: Hearing and external ear normal.      Left Ear: Hearing and external ear normal.      Mouth/Throat:      Mouth: Oropharynx is clear and moist and mucous membranes are normal. Mucous membranes are moist.      Pharynx: Oropharynx is clear.   Eyes:      General: Lids are normal. No scleral icterus.     Extraocular Movements: EOM normal.      Conjunctiva/sclera: Conjunctivae normal.      Pupils: Pupils are equal, round, and reactive to light.   Neck:      Trachea: Trachea and phonation normal. No tracheal deviation.   Cardiovascular:      Rate and Rhythm: Normal rate and regular rhythm.      Pulses: Normal pulses.           Radial pulses are 2+ on the right side and 2+ on the left side.      Heart sounds: Normal heart sounds, S1 normal and S2 normal. No murmur heard.  Pulmonary:      Effort: Pulmonary effort is normal. No tachypnea or respiratory distress.      Breath sounds: Normal breath sounds. No wheezing, rhonchi or rales.   Abdominal:      General: Bowel sounds are normal. There is no distension.      Palpations: Abdomen is soft.      Tenderness: There is no abdominal tenderness. There is no CVA tenderness, guarding or rebound.   Musculoskeletal:         General: No edema.      Right lower leg: No edema.      Left lower leg: No edema.   Skin:     General: Skin is warm and dry.      Capillary Refill: Capillary refill takes less than 2 seconds.      Findings: No rash.   Neurological:      General:  No focal deficit present.      Mental Status: She is alert and oriented to person, place, and time.      GCS: GCS eye subscore is 4. GCS verbal subscore is 5. GCS motor subscore is 6.      Cranial Nerves: No cranial nerve deficit.      Sensory: No sensory deficit.      Motor: No abnormal muscle tone.      Gait: Gait normal.   Psychiatric:         Mood and Affect: Mood is depressed. Affect is tearful.         Speech: Speech normal.         Behavior: Behavior normal. Behavior is cooperative.         Thought Content: Thought content is not paranoid. Thought content does not include homicidal or suicidal ideation.         Vital Signs  ED Triage Vitals [07/03/24 1550]   Temperature Pulse Respirations Blood Pressure SpO2   99.6 °F (37.6 °C) 61 20 115/69 98 %      Temp src Heart Rate Source Patient Position - Orthostatic VS BP Location FiO2 (%)   -- Monitor Sitting Left arm --      Pain Score       No Pain           Vitals:    07/03/24 1550 07/03/24 1709   BP: 115/69 111/64   Pulse: 61 62   Patient Position - Orthostatic VS: Sitting Sitting         Visual Acuity      ED Medications  Medications - No data to display    Diagnostic Studies  Results Reviewed       Procedure Component Value Units Date/Time    Lithium level [[id number]]  (Abnormal) Collected: 07/03/24 1630    Lab Status: Final result Specimen: Blood from Arm, Right Updated: 07/03/24 1954     Lithium Lvl <0.10 mmol/L     TSH, 3rd generation with Free T4 reflex [[id number]]  (Abnormal) Collected: 07/03/24 1630    Lab Status: Final result Specimen: Blood from Arm, Right Updated: 07/03/24 1707     TSH 3RD GENERATON 0.405 uIU/mL     T4, free [[id number]] Collected: 07/03/24 1630    Lab Status: In process Specimen: Blood from Arm, Right Updated: 07/03/24 1706    Rapid drug screen, urine [[id number]]  (Normal) Collected: 07/03/24 1630    Lab Status: Final result Specimen: Urine, Clean Catch Updated: 07/03/24 1656     Amph/Meth UR Negative     Barbiturate Ur Negative      Benzodiazepine Urine Negative     Cocaine Urine Negative     Methadone Urine Negative     Opiate Urine Negative     PCP Ur Negative     THC Urine Negative     Oxycodone Urine Negative     Fentanyl Urine Negative     HYDROCODONE URINE Negative    Narrative:      FOR MEDICAL PURPOSES ONLY.   IF CONFIRMATION NEEDED PLEASE CONTACT THE LAB WITHIN 5 DAYS.    Drug Screen Cutoff Levels:  AMPHETAMINE/METHAMPHETAMINES  1000 ng/mL  BARBITURATES     200 ng/mL  BENZODIAZEPINES     200 ng/mL  COCAINE      300 ng/mL  METHADONE      300 ng/mL  OPIATES      300 ng/mL  PHENCYCLIDINE     25 ng/mL  THC       50 ng/mL  OXYCODONE      100 ng/mL  FENTANYL      5 ng/mL  HYDROCODONE     300 ng/mL    Comprehensive metabolic panel [[id number]]  (Abnormal) Collected: 07/03/24 1630    Lab Status: Final result Specimen: Blood from Arm, Right Updated: 07/03/24 1652     Sodium 140 mmol/L      Potassium 3.7 mmol/L      Chloride 112 mmol/L      CO2 19 mmol/L      ANION GAP 9 mmol/L      BUN 20 mg/dL      Creatinine 0.80 mg/dL      Glucose 92 mg/dL      Calcium 9.7 mg/dL      AST 13 U/L      ALT 16 U/L      Alkaline Phosphatase 78 U/L      Total Protein 7.0 g/dL      Albumin 4.2 g/dL      Total Bilirubin 0.27 mg/dL      eGFR 93 ml/min/1.73sq m     Narrative:      National Kidney Disease Foundation guidelines for Chronic Kidney Disease (CKD):     Stage 1 with normal or high GFR (GFR > 90 mL/min/1.73 square meters)    Stage 2 Mild CKD (GFR = 60-89 mL/min/1.73 square meters)    Stage 3A Moderate CKD (GFR = 45-59 mL/min/1.73 square meters)    Stage 3B Moderate CKD (GFR = 30-44 mL/min/1.73 square meters)    Stage 4 Severe CKD (GFR = 15-29 mL/min/1.73 square meters)    Stage 5 End Stage CKD (GFR <15 mL/min/1.73 square meters)  Note: GFR calculation is accurate only with a steady state creatinine    CBC and differential [[id number]]  (Abnormal) Collected: 07/03/24 1630    Lab Status: Final result Specimen: Blood from Arm, Right Updated: 07/03/24 1636     WBC  10.55 Thousand/uL      RBC 5.00 Million/uL      Hemoglobin 14.2 g/dL      Hematocrit 45.8 %      MCV 92 fL      MCH 28.4 pg      MCHC 31.0 g/dL      RDW 13.3 %      MPV 9.6 fL      Platelets 331 Thousands/uL      nRBC 0 /100 WBCs      Segmented % 66 %      Immature Grans % 0 %      Lymphocytes % 24 %      Monocytes % 7 %      Eosinophils Relative 2 %      Basophils Relative 1 %      Absolute Neutrophils 6.95 Thousands/µL      Absolute Immature Grans 0.04 Thousand/uL      Absolute Lymphocytes 2.52 Thousands/µL      Absolute Monocytes 0.72 Thousand/µL      Eosinophils Absolute 0.25 Thousand/µL      Basophils Absolute 0.07 Thousands/µL     POCT alcohol breath test [[id number]]  (Normal) Resulted: 07/03/24 1626    Lab Status: Final result Updated: 07/03/24 1626     EXTBreath Alcohol 0.000                   No orders to display              Procedures  Procedures         ED Course  ED Course as of 07/03/24 2203 Wed Jul 03, 2024   1628 EXTBreath Alcohol: 0.000   1638 WBC(!): 10.55  Minimally elevated, improved from prior.  Has had a chronic leukocytosis with values ranging 13.97-16.87 over the past month.   1639 Hemoglobin: 14.2   1639 Platelet Count: 331   1710 TSH 3RD GENERATON(!): 0.405  Mildly decreased, on levothyroxine   1710 Rapid drug screen, urine  negative   1710 GLUCOSE: 92   1711 Creatinine: 0.80   1711 BUN: 20   1711 Sodium: 140   1711 Potassium: 3.7   1711 Chloride(!): 112   1711 Carbon Dioxide(!): 19   1711 ANION GAP: 9   1711 Calcium: 9.7   1711 AST: 13   1711 ALT: 16   1711 ALK PHOS: 78   1711 Total Protein: 7.0   1711 Albumin: 4.2   1711 Total Bilirubin: 0.27   1711 GFR, Calculated: 93   1732 Patient interviewed and examined by me, available labs and studies reviewed and found to be medically stable for crisis evaluation. There are no medical conditions which are untreated, unstable or requiring acute intervention.    1907 Pt signed 201; cosigned by attending.   1955 LITHIUM LEVEL(!): <0.10  Consistent  with pt not taking this medication any longer   2114 Sign out to attending Dr. Batista.  Signed 201, pending bed search.           Prior records reviewed.  Was seen in ER on 6/21/24 for elevated lithium level.  Was low on repeat 6/24/24 after medication was discontinued.                        SBIRT 20yo+      Flowsheet Row Most Recent Value   Initial Alcohol Screen: US AUDIT-C     1. How often do you have a drink containing alcohol? 0 Filed at: 07/03/2024 1548   2. How many drinks containing alcohol do you have on a typical day you are drinking?  0 Filed at: 07/03/2024 1548   3a. Male UNDER 65: How often do you have five or more drinks on one occasion? 0 Filed at: 07/03/2024 1548   3b. FEMALE Any Age, or MALE 65+: How often do you have 4 or more drinks on one occassion? 0 Filed at: 07/03/2024 1548   Audit-C Score 0 Filed at: 07/03/2024 1548   JENNIFER: How many times in the past year have you...    Used an illegal drug or used a prescription medication for non-medical reasons? Never Filed at: 07/03/2024 1548                      Medical Decision Making  37 yo female presenting for evaluation of depression.  Prior records reviewed.  She does have prior inpatient psychiatric hospitalization stay.    Will perform medical screening and consult crisis.  Pt is agreeable to 201.  Anticipate admission for BHU due to her severe depression.  No SI or HI.  If pt would choose to leave, could continue to follow up with her therapist and psychiatrist in outpatient setting.    Work up obtained as noted above.  Pt medically cleared for crisis evaluation.  Pt was evaluated by crisis and 201 signed.  Bed search under way.   Pt in stable condition at time of my departure from the ED; pending placement.    Please refer to above ER course for further details/discussion.      Problems Addressed:  Decreased thyroid stimulating hormone (TSH) level: acute illness or injury     Details: On synthroid.  No s/sx hyperthyroidism.  Depression:  acute illness or injury    Amount and/or Complexity of Data Reviewed  External Data Reviewed: labs and notes.  Labs: ordered. Decision-making details documented in ED Course.  Discussion of management or test interpretation with external provider(s): Crisis, attending    Risk  Prescription drug management.  Decision regarding hospitalization.             Disposition  Final diagnoses:   Depression   Decreased thyroid stimulating hormone (TSH) level     Time reflects when diagnosis was documented in both MDM as applicable and the Disposition within this note       Time User Action Codes Description Comment    7/3/2024  7:07 PM Suzi Ho [F32.A] Depression     7/3/2024  9:31 PM Suzi Ho [R79.89] Decreased thyroid stimulating hormone (TSH) level           ED Disposition       ED Disposition   Transfer to Behavioral Health Condition   --    Date/Time   Wed Jul 3, 2024 1907    Comment   Gita Rendon should be transferred out to [facility] and has been medically cleared.               Follow-up Information    None         Patient's Medications   Discharge Prescriptions    No medications on file       No discharge procedures on file.    PDMP Review         Value Time User    PDMP Reviewed  Yes 4/16/2024  2:32 PM LEIGHA Kaiser            ED Provider  Electronically Signed by             Suzi Ho PA-C  07/03/24 4449

## 2024-07-04 ENCOUNTER — HOSPITAL ENCOUNTER (INPATIENT)
Facility: HOSPITAL | Age: 39
LOS: 8 days | Discharge: HOME | DRG: 885 | End: 2024-07-12
Attending: STUDENT IN AN ORGANIZED HEALTH CARE EDUCATION/TRAINING PROGRAM | Admitting: PSYCHIATRY & NEUROLOGY
Payer: COMMERCIAL

## 2024-07-04 VITALS
RESPIRATION RATE: 20 BRPM | DIASTOLIC BLOOD PRESSURE: 64 MMHG | OXYGEN SATURATION: 100 % | HEART RATE: 75 BPM | SYSTOLIC BLOOD PRESSURE: 120 MMHG | TEMPERATURE: 99.6 F

## 2024-07-04 DIAGNOSIS — F33.2 SEVERE EPISODE OF RECURRENT MAJOR DEPRESSIVE DISORDER, WITHOUT PSYCHOTIC FEATURES (HCC): Primary | ICD-10-CM

## 2024-07-04 DIAGNOSIS — Z00.8 MEDICAL CLEARANCE FOR PSYCHIATRIC ADMISSION: ICD-10-CM

## 2024-07-04 LAB
EXT PREGNANCY TEST URINE: NEGATIVE
EXT. CONTROL: NORMAL

## 2024-07-04 PROCEDURE — 81025 URINE PREGNANCY TEST: CPT | Performed by: EMERGENCY MEDICINE

## 2024-07-04 PROCEDURE — 99285 EMERGENCY DEPT VISIT HI MDM: CPT | Performed by: EMERGENCY MEDICINE

## 2024-07-04 RX ORDER — GABAPENTIN 600 MG/1
900 TABLET ORAL
Status: CANCELLED | OUTPATIENT
Start: 2024-07-04

## 2024-07-04 RX ORDER — PANTOPRAZOLE SODIUM 20 MG/1
20 TABLET, DELAYED RELEASE ORAL
Status: DISCONTINUED | OUTPATIENT
Start: 2024-07-04 | End: 2024-07-04 | Stop reason: HOSPADM

## 2024-07-04 RX ORDER — POLYETHYLENE GLYCOL 3350 17 G/17G
17 POWDER, FOR SOLUTION ORAL DAILY PRN
Status: DISCONTINUED | OUTPATIENT
Start: 2024-07-04 | End: 2024-07-12 | Stop reason: HOSPADM

## 2024-07-04 RX ORDER — ALMOTRIPTAN 12.5 MG/1
12.5 TABLET, FILM COATED ORAL ONCE AS NEEDED
Status: CANCELLED | OUTPATIENT
Start: 2024-07-04

## 2024-07-04 RX ORDER — ESCITALOPRAM OXALATE 20 MG/1
20 TABLET ORAL DAILY
Status: CANCELLED | OUTPATIENT
Start: 2024-07-04

## 2024-07-04 RX ORDER — LEVOTHYROXINE SODIUM 112 UG/1
112 TABLET ORAL
Status: CANCELLED | OUTPATIENT
Start: 2024-07-05

## 2024-07-04 RX ORDER — PANTOPRAZOLE SODIUM 20 MG/1
20 TABLET, DELAYED RELEASE ORAL
Status: DISCONTINUED | OUTPATIENT
Start: 2024-07-05 | End: 2024-07-07

## 2024-07-04 RX ORDER — PANTOPRAZOLE SODIUM 20 MG/1
20 TABLET, DELAYED RELEASE ORAL
Status: CANCELLED | OUTPATIENT
Start: 2024-07-04

## 2024-07-04 RX ORDER — LORATADINE 10 MG/1
10 TABLET ORAL DAILY
Status: DISCONTINUED | OUTPATIENT
Start: 2024-07-05 | End: 2024-07-04

## 2024-07-04 RX ORDER — ACETAMINOPHEN 325 MG/1
650 TABLET ORAL EVERY 4 HOURS PRN
Status: CANCELLED | OUTPATIENT
Start: 2024-07-04

## 2024-07-04 RX ORDER — ACETAZOLAMIDE 250 MG/1
500 TABLET ORAL 2 TIMES DAILY
Status: CANCELLED | OUTPATIENT
Start: 2024-07-04

## 2024-07-04 RX ORDER — OLANZAPINE 5 MG/1
5 TABLET ORAL
Status: DISCONTINUED | OUTPATIENT
Start: 2024-07-04 | End: 2024-07-12 | Stop reason: HOSPADM

## 2024-07-04 RX ORDER — CELECOXIB 100 MG/1
200 CAPSULE ORAL 2 TIMES DAILY
Status: DISCONTINUED | OUTPATIENT
Start: 2024-07-05 | End: 2024-07-12 | Stop reason: HOSPADM

## 2024-07-04 RX ORDER — BISACODYL 10 MG
10 SUPPOSITORY, RECTAL RECTAL DAILY PRN
Status: DISCONTINUED | OUTPATIENT
Start: 2024-07-04 | End: 2024-07-12 | Stop reason: HOSPADM

## 2024-07-04 RX ORDER — LEVOTHYROXINE SODIUM 112 UG/1
112 TABLET ORAL
Status: DISCONTINUED | OUTPATIENT
Start: 2024-07-05 | End: 2024-07-12 | Stop reason: HOSPADM

## 2024-07-04 RX ORDER — HYDROXYZINE 50 MG/1
100 TABLET, FILM COATED ORAL
Status: DISCONTINUED | OUTPATIENT
Start: 2024-07-04 | End: 2024-07-12 | Stop reason: HOSPADM

## 2024-07-04 RX ORDER — LEVOTHYROXINE SODIUM 112 UG/1
112 TABLET ORAL
Status: CANCELLED | OUTPATIENT
Start: 2024-07-04

## 2024-07-04 RX ORDER — OLANZAPINE 10 MG/2ML
2.5 INJECTION, POWDER, FOR SOLUTION INTRAMUSCULAR
Status: CANCELLED | OUTPATIENT
Start: 2024-07-04

## 2024-07-04 RX ORDER — MIRABEGRON 25 MG/1
25 TABLET, FILM COATED, EXTENDED RELEASE ORAL DAILY
Status: DISCONTINUED | OUTPATIENT
Start: 2024-07-05 | End: 2024-07-04

## 2024-07-04 RX ORDER — LUBIPROSTONE 8 UG/1
8 CAPSULE ORAL 2 TIMES DAILY
Status: DISCONTINUED | OUTPATIENT
Start: 2024-07-04 | End: 2024-07-04

## 2024-07-04 RX ORDER — ESCITALOPRAM OXALATE 20 MG/1
20 TABLET ORAL DAILY
Status: CANCELLED | OUTPATIENT
Start: 2024-07-05

## 2024-07-04 RX ORDER — GABAPENTIN 300 MG/1
300 CAPSULE ORAL
Status: DISCONTINUED | OUTPATIENT
Start: 2024-07-05 | End: 2024-07-12 | Stop reason: HOSPADM

## 2024-07-04 RX ORDER — OLANZAPINE 10 MG/2ML
5 INJECTION, POWDER, FOR SOLUTION INTRAMUSCULAR
Status: DISCONTINUED | OUTPATIENT
Start: 2024-07-04 | End: 2024-07-12 | Stop reason: HOSPADM

## 2024-07-04 RX ORDER — FOLIC ACID 1 MG/1
1 TABLET ORAL DAILY
Status: DISCONTINUED | OUTPATIENT
Start: 2024-07-05 | End: 2024-07-12 | Stop reason: HOSPADM

## 2024-07-04 RX ORDER — LEVOTHYROXINE SODIUM 112 UG/1
TABLET ORAL
Status: DISPENSED
Start: 2024-07-04 | End: 2024-07-04

## 2024-07-04 RX ORDER — MIRABEGRON 25 MG/1
25 TABLET, FILM COATED, EXTENDED RELEASE ORAL DAILY
Status: DISCONTINUED | OUTPATIENT
Start: 2024-07-04 | End: 2024-07-04 | Stop reason: HOSPADM

## 2024-07-04 RX ORDER — ACETAZOLAMIDE 250 MG/1
500 TABLET ORAL 2 TIMES DAILY
Status: DISCONTINUED | OUTPATIENT
Start: 2024-07-04 | End: 2024-07-04

## 2024-07-04 RX ORDER — FUROSEMIDE 40 MG/1
20 TABLET ORAL DAILY PRN
Status: DISCONTINUED | OUTPATIENT
Start: 2024-07-04 | End: 2024-07-04 | Stop reason: HOSPADM

## 2024-07-04 RX ORDER — ALMOTRIPTAN 12.5 MG/1
12.5 TABLET, FILM COATED ORAL ONCE AS NEEDED
Status: DISCONTINUED | OUTPATIENT
Start: 2024-07-04 | End: 2024-07-04

## 2024-07-04 RX ORDER — PROCHLORPERAZINE MALEATE 5 MG/1
10 TABLET ORAL EVERY 6 HOURS PRN
Status: DISCONTINUED | OUTPATIENT
Start: 2024-07-04 | End: 2024-07-05

## 2024-07-04 RX ORDER — MAGNESIUM HYDROXIDE/ALUMINUM HYDROXICE/SIMETHICONE 120; 1200; 1200 MG/30ML; MG/30ML; MG/30ML
30 SUSPENSION ORAL EVERY 4 HOURS PRN
Status: DISCONTINUED | OUTPATIENT
Start: 2024-07-04 | End: 2024-07-12 | Stop reason: HOSPADM

## 2024-07-04 RX ORDER — IPRATROPIUM BROMIDE 42 UG/1
2 SPRAY, METERED NASAL EVERY 12 HOURS SCHEDULED
Status: DISCONTINUED | OUTPATIENT
Start: 2024-07-04 | End: 2024-07-04 | Stop reason: HOSPADM

## 2024-07-04 RX ORDER — IPRATROPIUM BROMIDE 21 UG/1
2 SPRAY, METERED NASAL EVERY 12 HOURS
Status: CANCELLED | OUTPATIENT
Start: 2024-07-04

## 2024-07-04 RX ORDER — LEVOTHYROXINE SODIUM 112 UG/1
112 TABLET ORAL
Status: DISCONTINUED | OUTPATIENT
Start: 2024-07-05 | End: 2024-07-04

## 2024-07-04 RX ORDER — HYDROCORTISONE 25 MG/G
CREAM TOPICAL 2 TIMES DAILY
Status: CANCELLED | OUTPATIENT
Start: 2024-07-04

## 2024-07-04 RX ORDER — PROCHLORPERAZINE MALEATE 5 MG/1
10 TABLET ORAL EVERY 6 HOURS PRN
Status: CANCELLED | OUTPATIENT
Start: 2024-07-04

## 2024-07-04 RX ORDER — PROPRANOLOL HYDROCHLORIDE 20 MG/1
10 TABLET ORAL EVERY 12 HOURS SCHEDULED
Status: CANCELLED | OUTPATIENT
Start: 2024-07-04

## 2024-07-04 RX ORDER — LORATADINE 10 MG/1
10 TABLET ORAL DAILY
Status: DISCONTINUED | OUTPATIENT
Start: 2024-07-04 | End: 2024-07-04 | Stop reason: HOSPADM

## 2024-07-04 RX ORDER — BENZTROPINE MESYLATE 1 MG/ML
1 INJECTION INTRAMUSCULAR; INTRAVENOUS
Status: DISCONTINUED | OUTPATIENT
Start: 2024-07-04 | End: 2024-07-12 | Stop reason: HOSPADM

## 2024-07-04 RX ORDER — CELECOXIB 100 MG/1
200 CAPSULE ORAL 2 TIMES DAILY
Status: DISCONTINUED | OUTPATIENT
Start: 2024-07-04 | End: 2024-07-04

## 2024-07-04 RX ORDER — LANOLIN ALCOHOL/MO/W.PET/CERES
3 CREAM (GRAM) TOPICAL
Status: DISCONTINUED | OUTPATIENT
Start: 2024-07-04 | End: 2024-07-12 | Stop reason: HOSPADM

## 2024-07-04 RX ORDER — ALMOTRIPTAN 12.5 MG/1
12.5 TABLET, FILM COATED ORAL ONCE AS NEEDED
Status: DISCONTINUED | OUTPATIENT
Start: 2024-07-04 | End: 2024-07-12 | Stop reason: HOSPADM

## 2024-07-04 RX ORDER — VILAZODONE HYDROCHLORIDE 10 MG/1
10 TABLET ORAL
Status: DISCONTINUED | OUTPATIENT
Start: 2024-07-05 | End: 2024-07-05

## 2024-07-04 RX ORDER — IPRATROPIUM BROMIDE 42 UG/1
2 SPRAY, METERED NASAL EVERY 12 HOURS SCHEDULED
Status: DISCONTINUED | OUTPATIENT
Start: 2024-07-04 | End: 2024-07-12 | Stop reason: HOSPADM

## 2024-07-04 RX ORDER — PROPRANOLOL HYDROCHLORIDE 10 MG/1
10 TABLET ORAL DAILY
Status: DISCONTINUED | OUTPATIENT
Start: 2024-07-05 | End: 2024-07-04

## 2024-07-04 RX ORDER — MIRABEGRON 25 MG/1
25 TABLET, FILM COATED, EXTENDED RELEASE ORAL DAILY
Status: CANCELLED | OUTPATIENT
Start: 2024-07-05

## 2024-07-04 RX ORDER — CELECOXIB 100 MG/1
200 CAPSULE ORAL 2 TIMES DAILY
Status: CANCELLED | OUTPATIENT
Start: 2024-07-04

## 2024-07-04 RX ORDER — OLANZAPINE 2.5 MG/1
2.5 TABLET, FILM COATED ORAL
Status: CANCELLED | OUTPATIENT
Start: 2024-07-04

## 2024-07-04 RX ORDER — ACETAZOLAMIDE 250 MG/1
500 TABLET ORAL 2 TIMES DAILY
Status: DISCONTINUED | OUTPATIENT
Start: 2024-07-04 | End: 2024-07-04 | Stop reason: HOSPADM

## 2024-07-04 RX ORDER — LORATADINE 10 MG/1
10 TABLET ORAL DAILY
Status: DISCONTINUED | OUTPATIENT
Start: 2024-07-05 | End: 2024-07-12 | Stop reason: HOSPADM

## 2024-07-04 RX ORDER — LUBIPROSTONE 8 UG/1
8 CAPSULE ORAL 2 TIMES DAILY
Status: DISCONTINUED | OUTPATIENT
Start: 2024-07-04 | End: 2024-07-04 | Stop reason: HOSPADM

## 2024-07-04 RX ORDER — LORATADINE 10 MG/1
10 TABLET ORAL DAILY
Status: CANCELLED | OUTPATIENT
Start: 2024-07-04

## 2024-07-04 RX ORDER — ESCITALOPRAM OXALATE 20 MG/1
20 TABLET ORAL DAILY
Status: DISCONTINUED | OUTPATIENT
Start: 2024-07-04 | End: 2024-07-04 | Stop reason: HOSPADM

## 2024-07-04 RX ORDER — ESCITALOPRAM OXALATE 20 MG/1
20 TABLET ORAL DAILY
Status: DISCONTINUED | OUTPATIENT
Start: 2024-07-05 | End: 2024-07-04

## 2024-07-04 RX ORDER — OLANZAPINE 10 MG/2ML
2.5 INJECTION, POWDER, FOR SOLUTION INTRAMUSCULAR
Status: DISCONTINUED | OUTPATIENT
Start: 2024-07-04 | End: 2024-07-12 | Stop reason: HOSPADM

## 2024-07-04 RX ORDER — OLANZAPINE 2.5 MG/1
5 TABLET, FILM COATED ORAL
Status: CANCELLED | OUTPATIENT
Start: 2024-07-04

## 2024-07-04 RX ORDER — BENZTROPINE MESYLATE 1 MG/1
1 TABLET ORAL
Status: DISCONTINUED | OUTPATIENT
Start: 2024-07-04 | End: 2024-07-04

## 2024-07-04 RX ORDER — IPRATROPIUM BROMIDE 42 UG/1
2 SPRAY, METERED NASAL EVERY 12 HOURS SCHEDULED
Status: CANCELLED | OUTPATIENT
Start: 2024-07-04

## 2024-07-04 RX ORDER — BENZTROPINE MESYLATE 1 MG/ML
1 INJECTION INTRAMUSCULAR; INTRAVENOUS
Status: CANCELLED | OUTPATIENT
Start: 2024-07-04

## 2024-07-04 RX ORDER — IPRATROPIUM BROMIDE 21 UG/1
2 SPRAY, METERED NASAL EVERY 12 HOURS
Status: DISCONTINUED | OUTPATIENT
Start: 2024-07-04 | End: 2024-07-04

## 2024-07-04 RX ORDER — LUBIPROSTONE 8 UG/1
8 CAPSULE ORAL 2 TIMES DAILY
Status: DISCONTINUED | OUTPATIENT
Start: 2024-07-04 | End: 2024-07-05 | Stop reason: ALTCHOICE

## 2024-07-04 RX ORDER — FOLIC ACID 1 MG/1
1 TABLET ORAL DAILY
Status: DISCONTINUED | OUTPATIENT
Start: 2024-07-04 | End: 2024-07-04 | Stop reason: HOSPADM

## 2024-07-04 RX ORDER — GABAPENTIN 300 MG/1
300 CAPSULE ORAL
Status: DISCONTINUED | OUTPATIENT
Start: 2024-07-04 | End: 2024-07-04 | Stop reason: HOSPADM

## 2024-07-04 RX ORDER — DIPHENHYDRAMINE HYDROCHLORIDE 50 MG/ML
50 INJECTION INTRAMUSCULAR; INTRAVENOUS EVERY 6 HOURS PRN
Status: CANCELLED | OUTPATIENT
Start: 2024-07-04

## 2024-07-04 RX ORDER — ACETAMINOPHEN 325 MG/1
650 TABLET ORAL EVERY 6 HOURS PRN
Status: CANCELLED | OUTPATIENT
Start: 2024-07-04

## 2024-07-04 RX ORDER — PROCHLORPERAZINE MALEATE 5 MG/1
10 TABLET ORAL 2 TIMES DAILY PRN
Status: DISCONTINUED | OUTPATIENT
Start: 2024-07-04 | End: 2024-07-12 | Stop reason: HOSPADM

## 2024-07-04 RX ORDER — GABAPENTIN 600 MG/1
600 TABLET ORAL
Status: DISCONTINUED | OUTPATIENT
Start: 2024-07-05 | End: 2024-07-04

## 2024-07-04 RX ORDER — SPIRONOLACTONE 25 MG/1
25 TABLET ORAL DAILY
Status: DISCONTINUED | OUTPATIENT
Start: 2024-07-05 | End: 2024-07-12 | Stop reason: HOSPADM

## 2024-07-04 RX ORDER — GABAPENTIN 300 MG/1
900 CAPSULE ORAL
Status: DISCONTINUED | OUTPATIENT
Start: 2024-07-04 | End: 2024-07-04 | Stop reason: HOSPADM

## 2024-07-04 RX ORDER — LANOLIN ALCOHOL/MO/W.PET/CERES
3 CREAM (GRAM) TOPICAL
Status: CANCELLED | OUTPATIENT
Start: 2024-07-04

## 2024-07-04 RX ORDER — LUBIPROSTONE 8 UG/1
8 CAPSULE ORAL 2 TIMES DAILY
Status: CANCELLED | OUTPATIENT
Start: 2024-07-04

## 2024-07-04 RX ORDER — ACETAZOLAMIDE 250 MG/1
500 TABLET ORAL 2 TIMES DAILY
Status: DISCONTINUED | OUTPATIENT
Start: 2024-07-05 | End: 2024-07-12 | Stop reason: HOSPADM

## 2024-07-04 RX ORDER — MAGNESIUM HYDROXIDE/ALUMINUM HYDROXICE/SIMETHICONE 120; 1200; 1200 MG/30ML; MG/30ML; MG/30ML
30 SUSPENSION ORAL EVERY 4 HOURS PRN
Status: CANCELLED | OUTPATIENT
Start: 2024-07-04

## 2024-07-04 RX ORDER — VILAZODONE HYDROCHLORIDE 20 MG/1
10 TABLET ORAL
Status: CANCELLED | OUTPATIENT
Start: 2024-07-05

## 2024-07-04 RX ORDER — GABAPENTIN 300 MG/1
900 CAPSULE ORAL
Status: CANCELLED | OUTPATIENT
Start: 2024-07-04

## 2024-07-04 RX ORDER — LORAZEPAM 2 MG/ML
2 INJECTION INTRAMUSCULAR EVERY 6 HOURS PRN
Status: CANCELLED | OUTPATIENT
Start: 2024-07-04

## 2024-07-04 RX ORDER — PROPRANOLOL HYDROCHLORIDE 20 MG/1
10 TABLET ORAL DAILY
Status: CANCELLED | OUTPATIENT
Start: 2024-07-05

## 2024-07-04 RX ORDER — PROCHLORPERAZINE MALEATE 5 MG/1
10 TABLET ORAL 2 TIMES DAILY PRN
Status: CANCELLED | OUTPATIENT
Start: 2024-07-04

## 2024-07-04 RX ORDER — POLYETHYLENE GLYCOL 3350 17 G/17G
17 POWDER, FOR SOLUTION ORAL DAILY PRN
Status: CANCELLED | OUTPATIENT
Start: 2024-07-04

## 2024-07-04 RX ORDER — ACETAMINOPHEN 325 MG/1
975 TABLET ORAL EVERY 6 HOURS PRN
Status: DISCONTINUED | OUTPATIENT
Start: 2024-07-04 | End: 2024-07-12 | Stop reason: HOSPADM

## 2024-07-04 RX ORDER — CELECOXIB 100 MG/1
200 CAPSULE ORAL 2 TIMES DAILY
Status: DISCONTINUED | OUTPATIENT
Start: 2024-07-04 | End: 2024-07-04 | Stop reason: HOSPADM

## 2024-07-04 RX ORDER — ACETAMINOPHEN 325 MG/1
975 TABLET ORAL EVERY 6 HOURS PRN
Status: CANCELLED | OUTPATIENT
Start: 2024-07-04

## 2024-07-04 RX ORDER — HYDROXYZINE HYDROCHLORIDE 25 MG/1
25 TABLET, FILM COATED ORAL
Status: CANCELLED | OUTPATIENT
Start: 2024-07-04

## 2024-07-04 RX ORDER — GABAPENTIN 300 MG/1
900 CAPSULE ORAL
Status: DISCONTINUED | OUTPATIENT
Start: 2024-07-04 | End: 2024-07-12 | Stop reason: HOSPADM

## 2024-07-04 RX ORDER — HYDROXYZINE HYDROCHLORIDE 25 MG/1
50 TABLET, FILM COATED ORAL
Status: CANCELLED | OUTPATIENT
Start: 2024-07-04

## 2024-07-04 RX ORDER — GABAPENTIN 300 MG/1
300 CAPSULE ORAL
Status: CANCELLED | OUTPATIENT
Start: 2024-07-05

## 2024-07-04 RX ORDER — LEVOTHYROXINE SODIUM 112 UG/1
112 TABLET ORAL
Status: DISCONTINUED | OUTPATIENT
Start: 2024-07-04 | End: 2024-07-04 | Stop reason: HOSPADM

## 2024-07-04 RX ORDER — AMOXICILLIN 250 MG
1 CAPSULE ORAL DAILY PRN
Status: CANCELLED | OUTPATIENT
Start: 2024-07-04

## 2024-07-04 RX ORDER — BISACODYL 10 MG
10 SUPPOSITORY, RECTAL RECTAL DAILY PRN
Status: CANCELLED | OUTPATIENT
Start: 2024-07-04

## 2024-07-04 RX ORDER — GABAPENTIN 600 MG/1
600 TABLET ORAL
Status: CANCELLED | OUTPATIENT
Start: 2024-07-04

## 2024-07-04 RX ORDER — HYDROCORTISONE 25 MG/G
CREAM TOPICAL 2 TIMES DAILY
Status: DISCONTINUED | OUTPATIENT
Start: 2024-07-04 | End: 2024-07-12 | Stop reason: HOSPADM

## 2024-07-04 RX ORDER — ALPRAZOLAM 0.5 MG/1
1 TABLET ORAL ONCE
Status: COMPLETED | OUTPATIENT
Start: 2024-07-04 | End: 2024-07-04

## 2024-07-04 RX ORDER — LORATADINE 10 MG/1
10 TABLET ORAL DAILY
Status: CANCELLED | OUTPATIENT
Start: 2024-07-05

## 2024-07-04 RX ORDER — ACETAMINOPHEN 325 MG/1
650 TABLET ORAL EVERY 6 HOURS PRN
Status: DISCONTINUED | OUTPATIENT
Start: 2024-07-04 | End: 2024-07-04

## 2024-07-04 RX ORDER — VILAZODONE HYDROCHLORIDE 20 MG/1
10 TABLET ORAL
Status: DISCONTINUED | OUTPATIENT
Start: 2024-07-04 | End: 2024-07-04 | Stop reason: HOSPADM

## 2024-07-04 RX ORDER — ESCITALOPRAM OXALATE 20 MG/1
20 TABLET ORAL DAILY
Status: DISCONTINUED | OUTPATIENT
Start: 2024-07-05 | End: 2024-07-05

## 2024-07-04 RX ORDER — ACETAMINOPHEN 325 MG/1
650 TABLET ORAL EVERY 4 HOURS PRN
Status: DISCONTINUED | OUTPATIENT
Start: 2024-07-04 | End: 2024-07-12 | Stop reason: HOSPADM

## 2024-07-04 RX ORDER — SPIRONOLACTONE 25 MG/1
25 TABLET ORAL DAILY
Status: CANCELLED | OUTPATIENT
Start: 2024-07-05

## 2024-07-04 RX ORDER — ALMOTRIPTAN 12.5 MG/1
12.5 TABLET, FILM COATED ORAL ONCE AS NEEDED
Status: DISCONTINUED | OUTPATIENT
Start: 2024-07-04 | End: 2024-07-04 | Stop reason: HOSPADM

## 2024-07-04 RX ORDER — SPIRONOLACTONE 25 MG/1
25 TABLET ORAL DAILY
Status: CANCELLED | OUTPATIENT
Start: 2024-07-04

## 2024-07-04 RX ORDER — PROCHLORPERAZINE MALEATE 5 MG/1
10 TABLET ORAL 2 TIMES DAILY PRN
Status: DISCONTINUED | OUTPATIENT
Start: 2024-07-04 | End: 2024-07-04 | Stop reason: HOSPADM

## 2024-07-04 RX ORDER — AMOXICILLIN 250 MG
1 CAPSULE ORAL DAILY PRN
Status: DISCONTINUED | OUTPATIENT
Start: 2024-07-04 | End: 2024-07-12 | Stop reason: HOSPADM

## 2024-07-04 RX ORDER — SPIRONOLACTONE 25 MG/1
25 TABLET ORAL DAILY
Status: DISCONTINUED | OUTPATIENT
Start: 2024-07-04 | End: 2024-07-04 | Stop reason: HOSPADM

## 2024-07-04 RX ORDER — PROPRANOLOL HYDROCHLORIDE 20 MG/1
10 TABLET ORAL DAILY
Status: DISCONTINUED | OUTPATIENT
Start: 2024-07-04 | End: 2024-07-04 | Stop reason: HOSPADM

## 2024-07-04 RX ORDER — GABAPENTIN 600 MG/1
900 TABLET ORAL
Status: DISCONTINUED | OUTPATIENT
Start: 2024-07-04 | End: 2024-07-04

## 2024-07-04 RX ORDER — MIRABEGRON 25 MG/1
25 TABLET, FILM COATED, EXTENDED RELEASE ORAL DAILY
Status: CANCELLED | OUTPATIENT
Start: 2024-07-04

## 2024-07-04 RX ORDER — PROPRANOLOL HYDROCHLORIDE 10 MG/1
10 TABLET ORAL EVERY 12 HOURS SCHEDULED
Status: DISCONTINUED | OUTPATIENT
Start: 2024-07-04 | End: 2024-07-04

## 2024-07-04 RX ORDER — LORAZEPAM 2 MG/ML
2 INJECTION INTRAMUSCULAR EVERY 6 HOURS PRN
Status: DISCONTINUED | OUTPATIENT
Start: 2024-07-04 | End: 2024-07-12 | Stop reason: HOSPADM

## 2024-07-04 RX ORDER — DIPHENHYDRAMINE HYDROCHLORIDE 50 MG/ML
50 INJECTION INTRAMUSCULAR; INTRAVENOUS EVERY 6 HOURS PRN
Status: DISCONTINUED | OUTPATIENT
Start: 2024-07-04 | End: 2024-07-12 | Stop reason: HOSPADM

## 2024-07-04 RX ORDER — HYDROXYZINE 50 MG/1
50 TABLET, FILM COATED ORAL
Status: DISCONTINUED | OUTPATIENT
Start: 2024-07-04 | End: 2024-07-12 | Stop reason: HOSPADM

## 2024-07-04 RX ORDER — MIRABEGRON 25 MG/1
25 TABLET, FILM COATED, EXTENDED RELEASE ORAL DAILY
Status: DISCONTINUED | OUTPATIENT
Start: 2024-07-05 | End: 2024-07-12 | Stop reason: HOSPADM

## 2024-07-04 RX ORDER — PROPRANOLOL HYDROCHLORIDE 10 MG/1
10 TABLET ORAL DAILY
Status: DISCONTINUED | OUTPATIENT
Start: 2024-07-05 | End: 2024-07-12 | Stop reason: HOSPADM

## 2024-07-04 RX ORDER — PANTOPRAZOLE SODIUM 40 MG/1
40 TABLET, DELAYED RELEASE ORAL
Status: CANCELLED | OUTPATIENT
Start: 2024-07-04

## 2024-07-04 RX ORDER — PANTOPRAZOLE SODIUM 40 MG/1
40 TABLET, DELAYED RELEASE ORAL
Status: DISCONTINUED | OUTPATIENT
Start: 2024-07-05 | End: 2024-07-04

## 2024-07-04 RX ORDER — OLANZAPINE 10 MG/2ML
5 INJECTION, POWDER, FOR SOLUTION INTRAMUSCULAR
Status: CANCELLED | OUTPATIENT
Start: 2024-07-04

## 2024-07-04 RX ORDER — HYDROXYZINE HYDROCHLORIDE 25 MG/1
100 TABLET, FILM COATED ORAL
Status: CANCELLED | OUTPATIENT
Start: 2024-07-04

## 2024-07-04 RX ORDER — OLANZAPINE 2.5 MG/1
2.5 TABLET, FILM COATED ORAL
Status: DISCONTINUED | OUTPATIENT
Start: 2024-07-04 | End: 2024-07-12 | Stop reason: HOSPADM

## 2024-07-04 RX ORDER — FOLIC ACID 1 MG/1
1 TABLET ORAL DAILY
Status: CANCELLED | OUTPATIENT
Start: 2024-07-05

## 2024-07-04 RX ORDER — BENZTROPINE MESYLATE 1 MG/1
1 TABLET ORAL
Status: CANCELLED | OUTPATIENT
Start: 2024-07-04

## 2024-07-04 RX ORDER — SPIRONOLACTONE 25 MG/1
25 TABLET ORAL DAILY
Status: DISCONTINUED | OUTPATIENT
Start: 2024-07-05 | End: 2024-07-04

## 2024-07-04 RX ORDER — HYDROXYZINE HYDROCHLORIDE 25 MG/1
25 TABLET, FILM COATED ORAL
Status: DISCONTINUED | OUTPATIENT
Start: 2024-07-04 | End: 2024-07-12 | Stop reason: HOSPADM

## 2024-07-04 RX ADMIN — CELECOXIB 200 MG: 100 CAPSULE ORAL at 08:42

## 2024-07-04 RX ADMIN — FOLIC ACID 1 MG: 1 TABLET ORAL at 08:45

## 2024-07-04 RX ADMIN — ALPRAZOLAM 1 MG: 0.5 TABLET ORAL at 12:43

## 2024-07-04 RX ADMIN — Medication 5000 UNITS: at 08:44

## 2024-07-04 RX ADMIN — GABAPENTIN 300 MG: 300 CAPSULE ORAL at 08:39

## 2024-07-04 RX ADMIN — LUBIPROSTONE 8 MCG: 8 CAPSULE, GELATIN COATED ORAL at 08:41

## 2024-07-04 RX ADMIN — METFORMIN HYDROCHLORIDE 500 MG: 500 TABLET ORAL at 08:37

## 2024-07-04 RX ADMIN — VILAZODONE HYDROCHLORIDE 10 MG: 20 TABLET ORAL at 08:38

## 2024-07-04 RX ADMIN — LORATADINE 10 MG: 10 TABLET ORAL at 08:39

## 2024-07-04 RX ADMIN — ESCITALOPRAM OXALATE 20 MG: 20 TABLET ORAL at 08:44

## 2024-07-04 RX ADMIN — LEVOTHYROXINE SODIUM 112 MCG: 112 TABLET ORAL at 05:25

## 2024-07-04 RX ADMIN — TOPIRAMATE 150 MG: 100 TABLET, FILM COATED ORAL at 18:54

## 2024-07-04 RX ADMIN — PROPRANOLOL HYDROCHLORIDE 10 MG: 20 TABLET ORAL at 08:43

## 2024-07-04 RX ADMIN — IPRATROPIUM BROMIDE 2 SPRAY: 42 SPRAY, METERED NASAL at 08:35

## 2024-07-04 RX ADMIN — CELECOXIB 200 MG: 100 CAPSULE ORAL at 18:53

## 2024-07-04 RX ADMIN — SPIRONOLACTONE 25 MG: 25 TABLET ORAL at 08:43

## 2024-07-04 RX ADMIN — B-COMPLEX W/ C & FOLIC ACID TAB 1 TABLET: TAB at 08:41

## 2024-07-04 RX ADMIN — ACETAZOLAMIDE 500 MG: 250 TABLET ORAL at 18:54

## 2024-07-04 RX ADMIN — TOPIRAMATE 150 MG: 100 TABLET, FILM COATED ORAL at 08:40

## 2024-07-04 RX ADMIN — ACETAZOLAMIDE 500 MG: 250 TABLET ORAL at 08:41

## 2024-07-04 RX ADMIN — GABAPENTIN 900 MG: 300 CAPSULE ORAL at 21:55

## 2024-07-04 RX ADMIN — PANTOPRAZOLE SODIUM 20 MG: 20 TABLET, DELAYED RELEASE ORAL at 08:37

## 2024-07-04 NOTE — ED NOTES
Insurance Authorization for admission:   Phone call placed to [facility]   Phone number: [phone]     Fax clinicals to [facility]  CLOSED NEED PRECERT REVIEWED ON 7/5/24    Eligibility Verification System checked - ([phone]).  Online system / automated system indicates: [place]

## 2024-07-04 NOTE — ED RE-EVALUATION NOTE
Accepted in signouts from Dr. Palmer a 38-year-old female with history of worsening depression currently under a 201 accepted at [facility] no active HI or SI alternating meds and diet.  Anticipated  transfer at approximately 1830     Lee Ann Batista MD  07/05/24 0152
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
## 2024-07-04 NOTE — ED RE-EVALUATION NOTE
Accepted in signouts from Aggie Ho [facility] at 2217; 38-year-old female with worsening depression aggravated by crisis is currently under a 201.  No active HI or SI.  She is awaiting bed placement.    Dr. Quintero assumes care at 5989     Lee Ann Batista MD  07/04/24 2521

## 2024-07-04 NOTE — ED NOTES
Patient is accepted at [facility]  Patient is accepted by Dr. Jordan Holter .     Transportation is arranged with [employer].      Patient may go to the floor after 1830.          Nurse report is to be called to [phone] prior to patient transfer.
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
## 2024-07-05 PROBLEM — F33.2 SEVERE EPISODE OF RECURRENT MAJOR DEPRESSIVE DISORDER, WITHOUT PSYCHOTIC FEATURES (HCC): Status: ACTIVE | Noted: 2024-07-05

## 2024-07-05 PROBLEM — E66.813 CLASS 3 SEVERE OBESITY WITH BODY MASS INDEX (BMI) OF 60.0 TO 69.9 IN ADULT (HCC): Status: RESOLVED | Noted: 2020-03-06 | Resolved: 2024-07-05

## 2024-07-05 PROBLEM — E66.01 CLASS 3 SEVERE OBESITY WITH BODY MASS INDEX (BMI) OF 60.0 TO 69.9 IN ADULT (HCC): Status: RESOLVED | Noted: 2020-03-06 | Resolved: 2024-07-05

## 2024-07-05 PROBLEM — Z00.8 MEDICAL CLEARANCE FOR PSYCHIATRIC ADMISSION: Status: ACTIVE | Noted: 2024-07-05

## 2024-07-05 LAB
ALBUMIN SERPL BCG-MCNC: 3.7 G/DL (ref 3.5–5)
ALP SERPL-CCNC: 62 U/L (ref 34–104)
ALT SERPL W P-5'-P-CCNC: 14 U/L (ref 7–52)
ANION GAP SERPL CALCULATED.3IONS-SCNC: 8 MMOL/L (ref 4–13)
AST SERPL W P-5'-P-CCNC: 12 U/L (ref 13–39)
BASOPHILS # BLD AUTO: 0.06 THOUSANDS/ÂΜL (ref 0–0.1)
BASOPHILS NFR BLD AUTO: 1 % (ref 0–1)
BILIRUB SERPL-MCNC: 0.28 MG/DL (ref 0.2–1)
BUN SERPL-MCNC: 18 MG/DL (ref 5–25)
CALCIUM SERPL-MCNC: 9.1 MG/DL (ref 8.4–10.2)
CHLORIDE SERPL-SCNC: 112 MMOL/L (ref 96–108)
CHOLEST SERPL-MCNC: 164 MG/DL
CO2 SERPL-SCNC: 20 MMOL/L (ref 21–32)
CREAT SERPL-MCNC: 0.68 MG/DL (ref 0.6–1.3)
EOSINOPHIL # BLD AUTO: 0.33 THOUSAND/ÂΜL (ref 0–0.61)
EOSINOPHIL NFR BLD AUTO: 4 % (ref 0–6)
ERYTHROCYTE [DISTWIDTH] IN BLOOD BY AUTOMATED COUNT: 13.3 % (ref 11.6–15.1)
GFR SERPL CREATININE-BSD FRML MDRD: 110 ML/MIN/1.73SQ M
GLUCOSE P FAST SERPL-MCNC: 91 MG/DL (ref 65–99)
GLUCOSE SERPL-MCNC: 91 MG/DL (ref 65–140)
HCT VFR BLD AUTO: 43.4 % (ref 34.8–46.1)
HDLC SERPL-MCNC: 40 MG/DL
HGB BLD-MCNC: 13.4 G/DL (ref 11.5–15.4)
IMM GRANULOCYTES # BLD AUTO: 0.03 THOUSAND/UL (ref 0–0.2)
IMM GRANULOCYTES NFR BLD AUTO: 0 % (ref 0–2)
LDLC SERPL CALC-MCNC: 101 MG/DL (ref 0–100)
LYMPHOCYTES # BLD AUTO: 2.5 THOUSANDS/ÂΜL (ref 0.6–4.47)
LYMPHOCYTES NFR BLD AUTO: 31 % (ref 14–44)
MCH RBC QN AUTO: 28.3 PG (ref 26.8–34.3)
MCHC RBC AUTO-ENTMCNC: 30.9 G/DL (ref 31.4–37.4)
MCV RBC AUTO: 92 FL (ref 82–98)
MONOCYTES # BLD AUTO: 0.54 THOUSAND/ÂΜL (ref 0.17–1.22)
MONOCYTES NFR BLD AUTO: 7 % (ref 4–12)
NEUTROPHILS # BLD AUTO: 4.57 THOUSANDS/ÂΜL (ref 1.85–7.62)
NEUTS SEG NFR BLD AUTO: 57 % (ref 43–75)
NONHDLC SERPL-MCNC: 124 MG/DL
NRBC BLD AUTO-RTO: 0 /100 WBCS
PLATELET # BLD AUTO: 276 THOUSANDS/UL (ref 149–390)
PMV BLD AUTO: 10.8 FL (ref 8.9–12.7)
POTASSIUM SERPL-SCNC: 3.4 MMOL/L (ref 3.5–5.3)
PROT SERPL-MCNC: 6.1 G/DL (ref 6.4–8.4)
RBC # BLD AUTO: 4.73 MILLION/UL (ref 3.81–5.12)
SODIUM SERPL-SCNC: 140 MMOL/L (ref 135–147)
TRIGL SERPL-MCNC: 114 MG/DL
WBC # BLD AUTO: 8.03 THOUSAND/UL (ref 4.31–10.16)

## 2024-07-05 PROCEDURE — 99223 1ST HOSP IP/OBS HIGH 75: CPT | Performed by: PHYSICIAN ASSISTANT

## 2024-07-05 PROCEDURE — 99223 1ST HOSP IP/OBS HIGH 75: CPT | Performed by: PSYCHIATRY & NEUROLOGY

## 2024-07-05 PROCEDURE — 80061 LIPID PANEL: CPT | Performed by: PSYCHIATRY & NEUROLOGY

## 2024-07-05 PROCEDURE — 85025 COMPLETE CBC W/AUTO DIFF WBC: CPT | Performed by: PSYCHIATRY & NEUROLOGY

## 2024-07-05 PROCEDURE — 80053 COMPREHEN METABOLIC PANEL: CPT | Performed by: PSYCHIATRY & NEUROLOGY

## 2024-07-05 RX ADMIN — MIRABEGRON 25 MG: 25 TABLET, FILM COATED, EXTENDED RELEASE ORAL at 09:34

## 2024-07-05 RX ADMIN — SPIRONOLACTONE 25 MG: 25 TABLET ORAL at 08:29

## 2024-07-05 RX ADMIN — TOPIRAMATE 150 MG: 25 TABLET, FILM COATED ORAL at 17:01

## 2024-07-05 RX ADMIN — PANTOPRAZOLE SODIUM 20 MG: 20 TABLET, DELAYED RELEASE ORAL at 15:57

## 2024-07-05 RX ADMIN — LEVOTHYROXINE SODIUM 112 MCG: 112 TABLET ORAL at 05:53

## 2024-07-05 RX ADMIN — IPRATROPIUM BROMIDE 2 SPRAY: 42 SPRAY, METERED NASAL at 09:37

## 2024-07-05 RX ADMIN — ATOGEPANT 60 MG: 60 TABLET ORAL at 09:35

## 2024-07-05 RX ADMIN — GABAPENTIN 300 MG: 300 CAPSULE ORAL at 08:30

## 2024-07-05 RX ADMIN — GABAPENTIN 900 MG: 300 CAPSULE ORAL at 21:52

## 2024-07-05 RX ADMIN — CYCLOSPORINE 1 DROP: 0 SOLUTION/ DROPS OPHTHALMIC; TOPICAL at 21:53

## 2024-07-05 RX ADMIN — ACETAZOLAMIDE 500 MG: 250 TABLET ORAL at 08:25

## 2024-07-05 RX ADMIN — LUBIPROSTONE 8 MCG: 8 CAPSULE, GELATIN COATED ORAL at 10:07

## 2024-07-05 RX ADMIN — LORATADINE 10 MG: 10 TABLET ORAL at 08:27

## 2024-07-05 RX ADMIN — PANTOPRAZOLE SODIUM 20 MG: 20 TABLET, DELAYED RELEASE ORAL at 05:54

## 2024-07-05 RX ADMIN — CELECOXIB 200 MG: 100 CAPSULE ORAL at 17:01

## 2024-07-05 RX ADMIN — ACETAZOLAMIDE 500 MG: 250 TABLET ORAL at 17:01

## 2024-07-05 RX ADMIN — PROPRANOLOL HYDROCHLORIDE 10 MG: 10 TABLET ORAL at 08:30

## 2024-07-05 RX ADMIN — TOPIRAMATE 150 MG: 25 TABLET, FILM COATED ORAL at 08:26

## 2024-07-05 RX ADMIN — FOLIC ACID 1 MG: 1 TABLET ORAL at 08:30

## 2024-07-05 RX ADMIN — Medication 5000 UNITS: at 08:30

## 2024-07-05 RX ADMIN — CYCLOSPORINE 1 DROP: 0 SOLUTION/ DROPS OPHTHALMIC; TOPICAL at 09:36

## 2024-07-05 RX ADMIN — IPRATROPIUM BROMIDE 2 SPRAY: 42 SPRAY, METERED NASAL at 21:53

## 2024-07-05 RX ADMIN — CARIPRAZINE 6 MG: 3 CAPSULE, GELATIN COATED ORAL at 08:26

## 2024-07-05 RX ADMIN — METFORMIN HYDROCHLORIDE 500 MG: 500 TABLET ORAL at 08:25

## 2024-07-05 RX ADMIN — B-COMPLEX W/ C & FOLIC ACID TAB 1 TABLET: TAB at 08:26

## 2024-07-05 NOTE — PLAN OF CARE
Problem: Ineffective Coping  Goal: Cooperates with admission process  Description: Interventions:   - Complete admission process  Outcome: Progressing  Goal: Identifies ineffective coping skills  Outcome: Progressing  Goal: Identifies healthy coping skills  Outcome: Progressing  Goal: Participates in unit activities  Description: Interventions:  - Provide therapeutic environment   - Provide required programming   - Redirect inappropriate behaviors   Outcome: Progressing     Problem: Depression  Goal: Treatment Goal: Demonstrate behavioral control of depressive symptoms, verbalize feelings of improved mood/affect, and adopt new coping skills prior to discharge  Outcome: Progressing  Goal: Verbalize thoughts and feelings  Description: Interventions:  - Assess and re-assess patient's level of risk   - Engage patient in 1:1 interactions, daily, for a minimum of 15 minutes   - Encourage patient to express feelings, fears, frustrations, hopes   Outcome: Progressing  Goal: Refrain from isolation  Description: Interventions:  - Develop a trusting relationship   - Encourage socialization   Outcome: Progressing  Goal: Attend and participate in unit activities, including therapeutic, recreational, and educational groups  Description: Interventions:  - Provide therapeutic and educational activities daily, encourage attendance and participation, and document same in the medical record   Outcome: Progressing  Goal: Complete daily ADLs, including personal hygiene independently, as able  Description: Interventions:  - Observe, teach, and assist patient with ADLS  -  Monitor and promote a balance of rest/activity, with adequate nutrition and elimination   Outcome: Progressing     Problem: Anxiety  Goal: Anxiety is at manageable level  Description: Interventions:  - Assess and monitor patient's anxiety level.   - Monitor for signs and symptoms (heart palpitations, chest pain, shortness of breath, headaches, nausea, feeling jumpy,  restlessness, irritable, apprehensive).   - Collaborate with interdisciplinary team and initiate plan and interventions as ordered.  - [place] patient to unit/surroundings  - Explain treatment plan  - Encourage participation in care  - Encourage verbalization of concerns/fears  - Identify coping mechanisms  - Assist in developing anxiety-reducing skills  - Administer/offer alternative therapies  - Limit or eliminate stimulants  Outcome: Progressing     Problem: DEPRESSION  Goal: Will be euthymic at discharge  Description: INTERVENTIONS:  - Administer medication as ordered  - Provide emotional support via 1:1 interaction with staff  - Encourage involvement in milieu/groups/activities  - Monitor for social isolation  Outcome: Progressing     Problem: Nutrition/Hydration-ADULT  Goal: Nutrient/Hydration intake appropriate for improving, restoring or maintaining nutritional needs  Description: Monitor and assess patient's nutrition/hydration status for malnutrition. Collaborate with interdisciplinary team and initiate plan and interventions as ordered.  Monitor patient's weight and dietary intake as ordered or per policy. Utilize nutrition screening tool and intervene as necessary. Determine patient's food preferences and provide high-protein, high-caloric foods as appropriate.     INTERVENTIONS:  - Monitor oral intake, urinary output, labs, and treatment plans  - Assess nutrition and hydration status and recommend course of action  - Evaluate amount of meals eaten  - Assist patient with eating if necessary   - Allow adequate time for meals  - Recommend/ encourage appropriate diets, oral nutritional supplements, and vitamin/mineral supplements  - Order, calculate, and assess calorie counts as needed  - Recommend, monitor, and adjust tube feedings and TPN/PPN based on assessed needs  - Assess need for intravenous fluids  - Provide specific nutrition/hydration education as appropriate  - Include patient/family/caregiver in  decisions related to nutrition  Outcome: Progressing

## 2024-07-05 NOTE — NURSING NOTE
"Patient was admitted as a 201 from [facility] ED for increased depression and anxiety. She states that she has been depressed for the past 9 months. She lost her father 3 years ago along with her stepfather and friends. She states that she has had multiple health issues such as idiopathic intracranial HTN. Patient blames her increased depression and 100 lbs of weight loss on her weight loss medications (Wegovy). Denies SI, HI, AVH. She states that she sometimes has passive SI with no plan. She states that her appetite is \"terrible\" and that she is \"struggling to eat.\" She was diagnosed with EILEEN, MDD, and OCD. She was rambling and stating that she feels overwhelmed with life and that she would never hurt herself. She also has financial stressors and claims that she is in the \"middle of bankruptcy.\" Admits to having too much sleep and too little food. CSSR-lifetime score was low risk. UDS + amph/meth. Oriented pt to unit. Meds were  over. Provider (Dr. Santos) notified of meds.  "

## 2024-07-05 NOTE — ASSESSMENT & PLAN NOTE
Admission labs reviewed, CBC, CMP, lipid panel, TSH (with reflex T4), acceptable  No additional labs pending  Vitals stable  UDS negative  COVID-19 negative  EKG (6/21/24) reveals NSR, 49bpm,   Medically stable for continued inpatient psychiatric treatment based on available results  Please contact [facility] with any questions or concerns

## 2024-07-05 NOTE — CMS CERTIFICATION NOTE
Recertification: Based upon physical, mental and social evaluations, I certify that inpatient psychiatric services continue to be medically necessary for this patient for a duration of 10 midnights for the treatment of  Severe episode of recurrent major depressive disorder, without psychotic features (HCC) Available alternative community resources still do not meet the patient's mental health care needs. I further attest that an established written individualized plan of care has been updated and is outlined in the patient's medical records.

## 2024-07-05 NOTE — TREATMENT PLAN
TREATMENT PLAN REVIEW - Behavioral Health Gitadarshan Rendon 38 y.o. 1985 female MRN: [id number]    [facility] QU IP BEHAVIORAL HLTH Room / Bed: [facility] 202/[facility] 202-02 Encounter: [id number]          Admit Date/Time:  7/4/2024  8:41 PM    Treatment Team:   DO Christy Mcdermott, RN  Vladimir Sawyer, RN  Minnie Owens, RN  Emilee Coleman, ANAHI Overton, RN  Arielle Escobedo, RN  Nancy Villa, YANG Michelle    Diagnosis: Principal Problem:    Severe episode of recurrent major depressive disorder, without psychotic features ([facility])  Active Problems:    Obsessive-compulsive disorder    Hypertension    Hypothyroidism    Overactive bladder    Binge-eating disorder, severe    Gambling disorder, moderate    Esophageal reflux    EILEEN (generalized anxiety disorder)    Lumbar degenerative disc disease    Morbid obesity ([facility])    PTSD (post-traumatic stress disorder)    Urgency incontinence    Vitamin D deficiency    Iliotibial band syndrome of left side    Piriformis syndrome of left side    Skin lesion    Prediabetes    Family history of cancer    Chronic migraine without aura without status migrainosus, not intractable    CAIO (obstructive sleep apnea)    Cervicalgia    Numbness and tingling in both hands    Dermatitis    Chronic constipation    Thyroid disease neuropathy (HCC)    Migraine headache      Patient Strengths/Assets: adapts well    Patient Barriers/Limitations: poor past treatment response    Short Term Goals: decrease in suicidal thoughts    Long Term Goals: free of suicidal thoughts    Progress Towards Goals: improving gradually    Recommended Treatment: medication management, patient medication education, group therapy, milieu therapy, continued Behavioral Health psychiatric evaluation/assessment process    Treatment Frequency: daily medication monitoring, group and milieu therapy daily, monitoring through  interdisciplinary rounds, monitoring through weekly patient care conferences    Expected Discharge Date:  5-10 days    Discharge Plan: discharge to home    Treatment Plan Created/Updated By: Kash Aguilera DO

## 2024-07-05 NOTE — PLAN OF CARE
Patient regularly attends groups and other unit activities.     Problem: Ineffective Coping  Goal: Identifies healthy coping skills  Outcome: Progressing  Goal: Participates in unit activities  Description: Interventions:  - Provide therapeutic environment   - Provide required programming   - Redirect inappropriate behaviors   Outcome: Progressing

## 2024-07-05 NOTE — CONSULTS
[facility]  Consult  Name: Gita Rendon 38 y.o. female I MRN: [id number]  Unit/Bed#: -02 I Date of Admission: 7/4/2024   Date of Service: 7/5/2024 I Hospital Day: 1    Inpatient consult for Medical Clearance for  patient  Consult performed by: Nancy Villa PA-C  Consult ordered by: Jordan C Holter, DO          Assessment & Plan   Medical clearance for psychiatric admission  Assessment & Plan  Admission labs reviewed, CBC, CMP, lipid panel, TSH (with reflex T4), acceptable  No additional labs pending  Vitals stable  UDS negative  COVID-19 negative  EKG (6/21/24) reveals NSR, 49bpm,   Medically stable for continued inpatient psychiatric treatment based on available results  Please contact [facility] with any questions or concerns    Migraine headache  Assessment & Plan  Continue Topamax daily and Celebrex    Chronic constipation  Assessment & Plan  Maintained on Linzess outpatient, patient has brought her own supply, continue    Numbness and tingling in both hands  Assessment & Plan  Continue gabapentin 900 mg nightly    CAIO (obstructive sleep apnea)  Assessment & Plan  Not using CPAP outpatient  Continue outpatient follow-up with sleep medicine    Prediabetes  Assessment & Plan  Continue metformin  Currently on Wegovy every Wednesday    Vitamin D deficiency  Assessment & Plan  Continue daily vitamin D supplementation    Morbid obesity (HCC)  Assessment & Plan  Recommend dietary changes and lifestyle modifications  Affects all aspects of care  Continue Wegovy every Wednesday    Esophageal reflux  Assessment & Plan  Continue PPI    Overactive bladder  Assessment & Plan  Maintained on Mirabegran, continue    Hypothyroidism  Assessment & Plan  Continue levothyroxine    Hypertension  Assessment & Plan  Intracranial hypertension, maintained on Diamox, spironolactone continue    * Severe episode of recurrent major depressive disorder, without psychotic features (HCC)  Assessment &  Plan  Management per psychiatry         VTE Prophylaxis:    [facility] patient    Mobility:      [facility] patient    Recommendations for Discharge:  Discharge timeline per primary team.  Routine follow-up with primary care provider.      Total Time Spent on Date of Encounter in care of patient: 35 mins. This time was spent on one or more of the following: performing physical exam; counseling and coordination of care; obtaining or reviewing history; documenting in the medical record; reviewing/ordering tests, medications or procedures; communicating with other healthcare professionals and discussing with patient's family/caregivers.    Collaboration of Care: Were Recommendations Directly Discussed with Primary Treatment Team? No    History of Present Illness:  Gita Rendon is a 38 y.o. female who is originally admitted to the behavioral health service due to worsening depression. We are consulted for management of chronic medical conditions.   Patient reports a past medical history of chronic migraine, chronic constipation, incontinence, hypothyroidism, overactive bladder, GERD, hypertension, currently following with the weight loss clinic. Patient should continue all prior to admission medications as prescribed by primary care provider/outpatient specialists.  Patient denies recent travel, illness or sick contacts.  Patient denies smoking, drinking or drug use.  Available admission lab work and vitals are acceptable.  Patient feels a baseline physical health.  Patient appears medically stable for inpatient psychiatric treatment at this time. Please contact [facility] with any medical questions or concerns if issues should arise.      Review of Systems:  Review of Systems   Psychiatric/Behavioral:  Positive for dysphoric mood.    All other systems reviewed and are negative.      Past Medical and Surgical History:   Past Medical History:   Diagnosis Date    Allergic     Allergic rhinitis     Anorexia nervosa in remission      Anxiety     Arthritis 5/8/2014    Back pain     Bipolar disorder (HCC)     Depression     Dermatitis 10/16/2021    Disease of thyroid gland     Diverticulitis of colon 9/20/2015    Dizziness     Ear problems     Eating disorder     Esophageal reflux 08/15/2013    GERD (gastroesophageal reflux disease) 8/15/2013    Headache(784.0)     Headache, tension-type     Hypertension     Hypothyroid     Idiopathic intracranial hypertension     Lumbar degenerative disc disease 05/08/2014    Migraine     Nasal congestion     Nosebleed     Obesity     Obsessive-compulsive disorder     Otitis media     Overactive bladder     Psychiatric disorder     Psychiatric illness     Restless leg syndrome, controlled     Seasonal allergies     Sinusitis     Sleep apnea     Sleep difficulties     Suicide and self-inflicted injury (HCC)     Tinnitus     TMJ dysfunction     Tonsillitis     Transcranial Magnetic Stimulation 09/06/2021    Urinary tract infection     Vision loss        Past Surgical History:   Procedure Laterality Date    DENTAL SURGERY      wisdom teeth-at [age] years. Other surgery dental extraxtion of bone spur (10 years ago)    IR LUMBAR PUNCTURE  02/26/2019    SINUS ENDOSCOPY      SINUS SURGERY      TONSILLECTOMY         Meds/Allergies:  PTA meds:   Prior to Admission Medications   Prescriptions Last Dose Informant Patient Reported? Taking?   Atogepant (Qulipta) 60 MG TABS  Self Yes No   Sig: Take 60 mg by mouth in the morning   Cequa 0.09 % SOLN  Self No No   Sig: Apply 1 drop to eye 2 (two) times a day   Mirabegron ER 25 MG TB24   No No   Sig: Take 25 mg by mouth daily   Multiple Vitamin (MULTIVITAMIN) capsule  Self Yes No   Sig: Take 1 capsule by mouth daily   Semaglutide-Weight Management (WEGOVY) 1.7 MG/0.75ML  Self No No   Sig: Inject 0.75 mL (1.7 mg total) under the skin once a week   acetaZOLAMIDE (DIAMOX) 250 mg tablet  Self No No   Sig: Take 2 tablets (500 mg total) by mouth 2 (two) times a day   almotriptan (AXERT)  12.5 MG tablet  Self Yes No   Sig: Take 12.5 mg by mouth once as needed for migraine may repeat in 2 hours if needed.  Limited to 2 x a week   cariprazine (VRAYLAR) 6 MG capsule  Self No No   Sig: Take 1 capsule (6 mg total) by mouth daily   celecoxib (CeleBREX) 200 mg capsule  Self No No   Sig: Take 1 capsule (200 mg total) by mouth 2 (two) times a day   cetirizine (ZyrTEC) 10 MG chewable tablet  Self Yes No   Sig: Chew 10 mg daily   cholecalciferol 1,000 units tablet  Self No No   Sig: Take 5 tablets (5,000 Units total) by mouth daily   escitalopram (LEXAPRO) 20 mg tablet  Self Yes No   Sig: Take 20 mg by mouth daily   furosemide (LASIX) 20 mg tablet  Self Yes No   Sig: Take 20 mg by mouth daily as needed   gabapentin (NEURONTIN) 600 MG tablet  Self No No   Sig: Take 1.5 tablets (900 mg total) by mouth daily at bedtime   gabapentin (NEURONTIN) 600 MG tablet   Yes No   Sig: Take 600 mg by mouth daily after breakfast   hydrocortisone (ANUSOL-HC) 2.5 % rectal cream   No No   Sig: Apply topically 2 (two) times a day   ipratropium (ATROVENT) 0.03 % nasal spray   No No   Si sprays into each nostril every 12 (twelve) hours   Patient not taking: Reported on 2024   levothyroxine 112 mcg tablet  Self No No   Sig: Take 1 tablet (112 mcg total) by mouth daily in the early morning   linaCLOtide (Linzess) 290 MCG CAPS  Self No No   Sig: Take 1 capsule by mouth daily   lithium carbonate (LITHOBID) 450 mg CR tablet   No No   Sig: Take 1 tablet (450 mg total) by mouth 2 (two) times a day for 28 days   Patient taking differently: Take 450 mg by mouth 2 (two) times a day Patient is taking 1 tablet in the morning and 2 tablets in the evening.   loteprednol etabonate (LOTEMAX) 0.5 % ophthalmic suspension  Self Yes No   Patient not taking: Reported on 2024   metFORMIN (GLUCOPHAGE) 500 mg tablet   No No   Sig: Take 1 tablet (500 mg total) by mouth daily with breakfast   metFORMIN (GLUCOPHAGE) 500 mg tablet   No No   Sig:  No Take 1 tablet (500 mg total) by mouth daily with breakfast   omeprazole (PriLOSEC) 20 mg delayed release capsule   No No   Sig: Take 1 capsule (20 mg total) by mouth 2 (two) times a day   prochlorperazine (COMPAZINE) 10 mg tablet  Self Yes No   Sig: Take 10 mg by mouth every 6 (six) hours as needed for nausea or vomiting Take 1 tablet by TID if needed for headache or nausea. Limit to 2x a week .   propranolol (INDERAL) 10 mg tablet  Self No No   Sig: Take 1 tablet (10 mg total) by mouth every 12 (twelve) hours   spironolactone (ALDACTONE) 25 mg tablet  Self No No   Sig: Take 1 tablet (25 mg total) by mouth daily   topiramate (TOPAMAX) 100 mg tablet  Self Yes No   Sig: TAKE 1.5 TABLETS BID      Facility-Administered Medications: None       Allergies:   Allergies   Allergen Reactions    Doxycycline      Pt refuses d/t remote H/O intracranial hypertension     Other Other (See Comments)     Seasonal allergies       Social History:  Marital Status: Single  Substance Use History:   Social History     Substance and Sexual Activity   Alcohol Use Never     Social History     Tobacco Use   Smoking Status Never    Passive exposure: Never   Smokeless Tobacco Never     Social History     Substance and Sexual Activity   Drug Use Never       Family History:  Family History   Problem Relation Age of Onset    Mental illness Mother     Depression Mother     Suicide Attempts Mother     Hypertension Mother     Diabetes Mother     Other Mother         bicuspid aortic valve    Anxiety disorder Mother     Psychiatric Illness Mother     Schizoaffective Disorder  Mother     Anemia Mother     Hypertension Father     Hypothyroidism Father     Thyroid disease Father     Hypertension Brother     Cancer Maternal Grandmother     Heart disease Maternal Grandmother     Stroke Maternal Grandmother     Breast cancer Maternal Grandmother     Skin cancer Maternal Grandmother     Arthritis Maternal Grandmother     Pneumonia Maternal Grandfather      "Cancer Maternal Grandfather     Dementia Maternal Grandfather     COPD Maternal Grandfather     Cancer Paternal Grandmother     Pneumonia Paternal Grandfather     Arthritis Family     Breast cancer Family     Osteopenia Family     Osteoporosis Family     Hypertension Family     Transient ischemic attack Family        Physical Exam:   Vitals:   Blood Pressure: 132/80 (07/05/24 1047)  Pulse: 64 (07/05/24 1047)  Temperature: 98.2 °F (36.8 °C) (07/05/24 1047)  Temp Source: Tympanic (07/05/24 1047)  Respirations: 18 (07/05/24 1047)  Height: 5' 7\" (170.2 cm) (07/04/24 2100)  Weight - Scale: (!) 144 kg (318 lb) (07/04/24 2100)  SpO2: 99 % (07/05/24 0724)    Physical Exam  Vitals and nursing note reviewed.   Constitutional:       General: She is not in acute distress.     Appearance: Normal appearance. She is well-developed. She is morbidly obese.   HENT:      Head: Normocephalic and atraumatic.   Eyes:      General: No scleral icterus.     Conjunctiva/sclera: Conjunctivae normal.   Cardiovascular:      Rate and Rhythm: Normal rate and regular rhythm.      Heart sounds: Murmur heard.   Pulmonary:      Effort: Pulmonary effort is normal.      Breath sounds: No wheezing, rhonchi or rales.   Abdominal:      General: There is no distension.      Palpations: Abdomen is soft.   Skin:     General: Skin is warm and dry.   Neurological:      Mental Status: She is alert. Mental status is at baseline.   Psychiatric:         Mood and Affect: Mood normal.        Additional Data:   Lab Results:    Results from last 7 days   Lab Units 07/05/24  0542   WBC Thousand/uL 8.03   HEMOGLOBIN g/dL 13.4   HEMATOCRIT % 43.4   PLATELETS Thousands/uL 276   SEGS PCT % 57   LYMPHO PCT % 31   MONO PCT % 7   EOS PCT % 4     Results from last 7 days   Lab Units 07/05/24  0542   SODIUM mmol/L 140   POTASSIUM mmol/L 3.4*   CHLORIDE mmol/L 112*   CO2 mmol/L 20*   BUN mg/dL 18   CREATININE mg/dL 0.68   ANION GAP mmol/L 8   CALCIUM mg/dL 9.1   ALBUMIN g/dL 3.7 "   TOTAL BILIRUBIN mg/dL 0.28   ALK PHOS U/L 62   ALT U/L 14   AST U/L 12*   GLUCOSE RANDOM mg/dL 91             Lab Results   Component Value Date    HGBA1C 5.3 04/05/2024    HGBA1C 6.1 (H) 08/04/2023    HGBA1C 5.6 03/08/2022               Imaging: No pertinent imaging reviewed.  No orders to display       EKG, Pathology, and Other Studies Reviewed on Admission:   EKG: Sinus Bradycardia. HR 49bpm,  (6/21/24).    ** Please Note: This note may have been constructed using a voice recognition system. **

## 2024-07-05 NOTE — ASSESSMENT & PLAN NOTE
Recommend dietary changes and lifestyle modifications  Affects all aspects of care  Continue Wegovy every Wednesday

## 2024-07-05 NOTE — NURSING NOTE
"Bin  - 2x black Rene shirt  - 5x tank tops (black, green, blue tie-dye, black tie-dye, white)  - blue Rene shorts  - green Rene short  - black Camo Ranjit shorts  - red [place] backpack  - small blanket  - maroon  cable and white block  - black/gray [place] bag  - x2 underwear (blue  - clear toiletry bag with makeup and tools (yellow pencil sharpener included)  - assortment of keys and keychains  - 3x black socks  - black Rene sneakers (with laces)   - red Rene shirt  - gray/black Rene shorts  - blue notebook with personal papers and pen  - black/gray Rene wallet (3 quarters, [employer] 6078, [employer] 0995, EBT card, [employer] 9900, red debit card 6304, [employer] card, 2x insurance card, Cynthia card  - white iPhone (scratched upon arrival)    Bedside  - 2x black Rene shirt  - kaylee green shorts  - 5x underwear (gray black, blue)  - black/red Ranjit shorts  - black/gray/red Ranjit shorts  - sherman/orange Rene shorts  - black camo Ranjit shorts  - green short sleeve shirt  - gray long sleeve sweater  - black brush and comb  - shampoo, conditioner, toothpaste, lotion, deodorant  - white/black Rene slides  - 5x black socks  - black \"23\" short sleeve shirt  - pads  - 4x sports bras (blue, green, black)  - paper with phone #s      At nurse's station  - Oxy acne care  - Dove bar soap      Given to security  - $31 cash      All medications given to nurse  "

## 2024-07-05 NOTE — H&P
Psychiatric Evaluation - Behavioral Health   Gita Rendon 38 y.o. female MRN: [id number]  Unit/Bed#: [facility] [place] 202-02 Encounter: [id number]    Assessment & Plan   Principal Problem:    Severe episode of recurrent major depressive disorder, without psychotic features ([facility])  Active Problems:    Obsessive-compulsive disorder    Hypertension    Hypothyroidism    Overactive bladder    Binge-eating disorder, severe    Gambling disorder, moderate    Esophageal reflux    EILEEN (generalized anxiety disorder)    Lumbar degenerative disc disease    Morbid obesity (HCC)    PTSD (post-traumatic stress disorder)    Urgency incontinence    Vitamin D deficiency    Iliotibial band syndrome of left side    Piriformis syndrome of left side    Skin lesion    Prediabetes    Family history of cancer    Chronic migraine without aura without status migrainosus, not intractable    CAIO (obstructive sleep apnea)    Cervicalgia    Numbness and tingling in both hands    Dermatitis    Chronic constipation    Thyroid disease neuropathy ([facility])    Migraine headache      Plan:   Continue Vraylar 6mg PO QD and Gabapentin 300mg PO QD and 900mg PO HS. Discontinue Viibryd. Will plan to start Cymbalta on Monday if symptoms do not improve. Patient presents as a unipolar depression with concurrent anxiety and strong underlying personality traits.   Admit to [facility] inpatient psychiatry.  Medical management per SLIM.  Check admission labs: CMP, CBC, TSH, RPR, UDS, Lipid Panel, A1c.  Collaborate with case management for baseline assessment and disposition planning.  Chart reviewed and case discussed with treatment team.  Start/continue the following medications:  Current Facility-Administered Medications   Medication Dose Route Frequency Provider Last Rate    acetaminophen  650 mg Oral Q4H PRN Jordan C Holter, DO      acetaminophen  975 mg Oral Q6H PRN Jordan C Holter, DO      acetaZOLAMIDE  500 mg Oral BID Jordan C Holter, DO       almotriptan  12.5 mg Oral Once PRN [facility] Holter, DO      aluminum-magnesium hydroxide-simethicone  30 mL Oral Q4H PRN [facility] Holter, DO      Atogepant  60 mg Oral Daily [facility] Holter, DO      benztropine  1 mg Intramuscular Q4H PRN Max 6/day [facility] Holter, DO      bisacodyl  10 mg Rectal Daily PRN [facility] Holter, DO      cariprazine  6 mg Oral Daily [facility] Holter, DO      celecoxib  200 mg Oral BID [facility] Holter, DO      Cholecalciferol  5,000 Units Oral Daily [facility] Holter, DO      cycloSPORINE (PF)  1 drop Ophthalmic BID [facility] Holter, DO      hydrOXYzine HCL  50 mg Oral Q6H PRN Max 4/day [facility] Holter, DO      Or    diphenhydrAMINE  50 mg Intramuscular Q6H PRN [facility] Holter, DO      folic acid  1 mg Oral Daily [facility] Holter, DO      gabapentin  300 mg Oral After Breakfast [facility] Holter, DO      gabapentin  900 mg Oral HS [facility] Holter, DO      hydrocortisone   Topical BID [facility] Holter, DO      hydrOXYzine HCL  100 mg Oral Q6H PRN Max 4/day [facility] Holter, DO      Or    LORazepam  2 mg Intramuscular Q6H PRN [facility] Holter, DO      hydrOXYzine HCL  25 mg Oral Q6H PRN Max 4/day [facility] Holter, DO      ipratropium  2 spray Each Nare Q12H YFN [facility] Holter, DO      levothyroxine  112 mcg Oral Early Morning [facility] Holter, DO      loratadine  10 mg Oral Daily [facility] Holter, DO      lubiprostone  8 mcg Oral BID [facility] Holter, DO      melatonin  3 mg Oral HS PRN [facility] Holter, DO      metFORMIN  500 mg Oral Daily With Breakfast [facility] Holter, DO      Mirabegron ER  25 mg Oral Daily [facility] Holter, DO      multivitamin stress formula  1 tablet Oral Daily [facility] Holter, DO      OLANZapine  5 mg Oral Q4H PRN Max 3/day [facility] Holter, DO      Or    OLANZapine  2.5 mg Intramuscular Q4H PRN Max 3/day [facility] Holter, DO      OLANZapine  5 mg Oral Q3H PRN Max 3/day [facility] Holter, DO      Or    OLANZapine  5 mg Intramuscular Q3H PRN Max 3/day [facility] Holter, DO      OLANZapine   2.5 mg Oral Q4H PRN Max 6/day Satnam SARA Holter, DO      pantoprazole  20 mg Oral BID AC [facility] Holter, DO      polyethylene glycol  17 g Oral Daily PRN [facility] Holter, DO      prochlorperazine  10 mg Oral BID PRN [facility] Holter, DO      prochlorperazine  10 mg Oral Q6H PRN [facility] Holter, DO      propranolol  10 mg Oral Daily [facility] Holter, DO      [START ON 2024] Semaglutide-Weight Management  1.7 mg Subcutaneous Weekly [facility] Holter, DO      senna-docusate sodium  1 tablet Oral Daily PRN [facility] Holter, DO      spironolactone  25 mg Oral Daily [facility] Holter, DO      topiramate  150 mg Oral BID [facility] Holter, DO         Treatment options and alternatives were reviewed with the patient, who concurs with the above plan. Risks, benefits, and possible side effects of medications were explained to the patient, and she verbalizes understanding.      -----------------------------------    Chief Complaint: Depression    History of Present Illness     Per ED provider note:  38 year old female with PMH anxiety, depression, OCD, bipolar disorder, HTN, GERD, obesity, migraines presenting ambulatory from home for evaluation of depression.  Pt reports she has been dealing with depression for quite some time.  She feels it has gotten worse since last October.  She reports she went inpatient psych in April and was there for about 2 weeks but feels maybe this wasn't long enough.  She reports she has been seeing her therapist and her psychiatrist has adjusted her meds without relief.  She reports she was on lithium but her levels were high and she was taken off this medication.  She lives at home by herself.  She reports she just filed for bankruptcy.  She reports she had a 10 year old senior special needs dog but today took it to a shelter as she states she is unable to care for it or care for it financially.  She reports her dad  in  due to covid.  She notes her mom struggles with mental illness and is  currently living in a personal care home but is not herself and doesn't care about anyone.  She states she is depressed.  She hasn't been eating much (believes weight loss meds play a role with this too).  She reports she just wants to sleep all the time.  She reports no interest in doing anything.  She notes she will go days without taking a bath. She states she is tired and just cries all the time.  She has had anxiety as well.  She reports experiencing panic attacks.  She feels she needs to go back for inpatient treatment.  She denies fever, chills, cough, congestion or recent illness.  Denies chest pain, SOB, N/V/D, abdominal pain.  No reported urinary complaints.  She notes a couple weeks ago when lithium level was elevated, it was rechecked after discontinuing the medication with improvement of the level.  She denies any suicidal ideation or attempt.  No homicidal ideation.  No hallucinations.  She denies taking any excess of her medications.  She denies any ingestion of drugs or illicit substances.      Per Crisis worker note:  Crisis met with pt and discussed Crisis Intake and Safety Risk Assessment. Introduce self, role, and evaluation process. Pt is a 38 year old female who presents in ED with complaints of worsening depression and anxiety.  Pt states for the past several months she has been having an increase in sadness, feeling lonely, not wanting to get out of bed. She was seen by her psychiatrist at [facility] two weeks ago.  Her Lexapro medication was discontinued, and her doctor prescribe Vybrid.  Patient states last in patient admission was in April 2024 and was admitted at [facility].  She denies suicidal or homicidal thoughts, auditory or visual hallucination or thoughts to self harm. Pt also denies drug and alcohol use. Pt is requesting in patient admission.  Crisis discussed treatment options. Pt agreeable to signing 201 after rights and 72 hour noticed were discussed. Pt and ED Provider signed 201,  sent to Intake for inpatient bed review.     On admission to Inpatient Psychiatric Unit:  Patient is a 39 y/o white female, with a PPHx of MDD, OCD, and EILEEN, with a past remote hx of Anorexia now in remission, who presents w/ increasing depression, anxiety, and SI in the setting of psychosocial stressors.    Symptoms prior to hospitalization include: depressed mood, anxiety, suicidal thoughts, hopelessness, helplessness, and OCD symptoms. Symptom severity is rated as severe. Timeline is worsening over the past several weeks to months. Mitigating factors are none. Exacerbating stressors are financial difficulties, giving away her 10 y/o dog, and grieving the loss of her father and step-father 3 years ago.     On evaluation today, patient reports ongoing depression, anxiety, and passive SI w/o plan. She was recently put on Viibryd with poor effect. She wants to change medications but is aware that her stressors are very situational. Was taken off Lithium after level came back high in OP setting. Discussed plan to DC Viibryd and continue Vraylar, Gabapentin, and Topamax. Discussed starting Cymbalta next week if symptoms persist after a washout of Viibryd. Patient agreeable. She denies manic or psychotic sxs. Previous hx of cutting and anorexia. No current eating disordered behaviors or self harm.     Psychiatric Review Of Systems:  Medication side effects: none  Sleep: no change  Appetite: no change  Hygiene: able to tend to instrumental and basic ADLs  Anxiety Symptoms: +  Psychotic Symptoms: denies  Depression Symptoms: +  Manic Symptoms: denies  PTSD Symptoms: denies  Suicidal Thoughts: +  Homicidal Thoughts: denies    Medical Review Of Systems:   Patient denies headache or dizziness.   Patient denies chest pain or palpitations.  Patient denies difficulty breathing or wheezing.  Patient denies nausea, vomiting, or diarrhea.  Patient denies polyuria or polydipsia.  Patient denies weakness or numbness.  Pertinent  positives as per HPI.    Historical Information     Psychiatric History:   Diagnoses: MDD, OCD, EILEEN  Inpatient Hx: [facility] in Apr 2024  Outpatient Hx: +  Medications/Trials: Per HPI    Substance Abuse History:  Social History     Substance and Sexual Activity   Alcohol Use Never     Social History     Substance and Sexual Activity   Drug Use Never       I spent time with Gita in counseling and education on risk of substance abuse. I assessed motivation and encouraged her for treatment as appropriate.     Family History:   Family History   Problem Relation Age of Onset    Mental illness Mother     Depression Mother     Suicide Attempts Mother     Hypertension Mother     Diabetes Mother     Other Mother         bicuspid aortic valve    Anxiety disorder Mother     Psychiatric Illness Mother     Schizoaffective Disorder  Mother     Anemia Mother     Hypertension Father     Hypothyroidism Father     Thyroid disease Father     Hypertension Brother     Cancer Maternal Grandmother     Heart disease Maternal Grandmother     Stroke Maternal Grandmother     Breast cancer Maternal Grandmother     Skin cancer Maternal Grandmother     Arthritis Maternal Grandmother     Pneumonia Maternal Grandfather     Cancer Maternal Grandfather     Dementia Maternal Grandfather     COPD Maternal Grandfather     Cancer Paternal Grandmother     Pneumonia Paternal Grandfather     Arthritis Family     Breast cancer Family     Osteopenia Family     Osteoporosis Family     Hypertension Family     Transient ischemic attack Family        Social History:  Highest education: HS  Currently living: W/ room mate  Relationships: Single  Children: None  Occupation: On disability  No legal /     Rest of social history as per below:    Social History     Socioeconomic History    Marital status: Single     Spouse name: Not on file    Number of children: Not on file    Years of education: Not on file    Highest education level: Not on file    Occupational History    Occupation: DISABLED   Tobacco Use    Smoking status: Never     Passive exposure: Never    Smokeless tobacco: Never   Vaping Use    Vaping status: Never Used   Substance and Sexual Activity    Alcohol use: Never    Drug use: Never    Sexual activity: Not Currently     Partners: Female     Comment: No STDs   Other Topics Concern    Not on file   Social History Narrative    Always uses seat belts    Caffeine use     Disabled - permament disability since 2008 due to psychiatric illness    Has no children    Single     Tea     Social Determinants of Health     Financial Resource Strain: Low Risk  (10/30/2023)    Received from [facility]    Financial Resource Strain     Do you have any trouble paying for your medications, or do you think you might in the future?: No     Does your family have trouble paying for medicine? (Household - for ages 0-17 years): Not on file   Food Insecurity: No Food Insecurity (6/17/2024)    Hunger Vital Sign     Worried About Running Out of Food in the Last Year: Never true     Ran Out of Food in the Last Year: Never true   Transportation Needs: No Transportation Needs (6/17/2024)    PRAPARE - Transportation     Lack of Transportation (Medical): No     Lack of Transportation (Non-Medical): No   Physical Activity: Not on file   Stress: Not on file   Social Connections: Socially Integrated (10/30/2023)    Received from [facility]    Social Connections     How often do you feel lonely or isolated from those around you?: Never   Intimate Partner Violence: Not At Risk (4/5/2024)    Humiliation, Afraid, Rape, and Kick questionnaire     Fear of Current or Ex-Partner: No     Emotionally Abused: No     Physically Abused: No     Sexually Abused: No   Housing Stability: Low Risk  (6/17/2024)    Housing Stability Vital Sign     Unable to Pay for Housing in the Last Year: No     Number of Times Moved in the Last Year: 1     Homeless in the Last Year: No       Past Medical History:  "  Past Medical History:   Diagnosis Date    Allergic     Allergic rhinitis     Anorexia nervosa in remission     Anxiety     Arthritis 5/8/2014    Back pain     Bipolar disorder (HCC)     Depression     Dermatitis 10/16/2021    Disease of thyroid gland     Diverticulitis of colon 9/20/2015    Dizziness     Ear problems     Eating disorder     Esophageal reflux 08/15/2013    GERD (gastroesophageal reflux disease) 8/15/2013    Headache(784.0)     Headache, tension-type     Hypertension     Hypothyroid     Idiopathic intracranial hypertension     Lumbar degenerative disc disease 05/08/2014    Migraine     Nasal congestion     Nosebleed     Obesity     Obsessive-compulsive disorder     Otitis media     Overactive bladder     Psychiatric disorder     Psychiatric illness     Restless leg syndrome, controlled     Seasonal allergies     Sinusitis     Sleep apnea     Sleep difficulties     Suicide and self-inflicted injury (HCC)     Tinnitus     TMJ dysfunction     Tonsillitis     Transcranial Magnetic Stimulation 09/06/2021    Urinary tract infection     Vision loss         -----------------------------------  Objective    Temp:  [97.2 °F (36.2 °C)-98.9 °F (37.2 °C)] 98.9 °F (37.2 °C)  HR:  [59-64] 59  Resp:  [18] 18  BP: (125-127)/(61-71) 127/71    Mental Status Evaluation:  Appearance: casually dressed, consistent with stated age  Motor: +psychomotor retardation, no gait abnormalities  Behavior: cooperative, answers questions appropriately  Speech: soft, normal rhythm  Mood: \"depressed\"  Affect: constricted, depressed-appearing  Thought Process: linear and goal-oriented  Thought Content: denies auditory hallucinations, denies visual hallucinations, denies delusions  Risk Potential: denies suicidal ideation, plan, or intent. Denies homicidal ideation  Sensorium: Oriented to person, place, time, and situation  Cognition: cognitive ability appears intact but was not quantitatively tested  Consciousness: alert and " awake  Attention: intact, able to focus without difficulty  Insight: fair  Judgement: limited      Meds/Allergies   Allergies   Allergen Reactions    Doxycycline      Pt refuses d/t remote H/O intracranial hypertension     Other Other (See Comments)     Seasonal allergies         Behavioral Health Medications: all current active meds have been reviewed as per above. Changes as per above. Risks, benefits, indications, and alternatives to treatment were discussed with patient.     Laboratory results:  I have personally reviewed all pertinent laboratory/tests results.  Recent Results (from the past 48 hour(s))   POCT alcohol breath test    Collection Time: 07/03/24  4:26 PM   Result Value Ref Range    EXTBreath Alcohol 0.000    Rapid drug screen, urine    Collection Time: 07/03/24  4:30 PM   Result Value Ref Range    Amph/Meth UR Negative Negative    Barbiturate Ur Negative Negative    Benzodiazepine Urine Negative Negative    Cocaine Urine Negative Negative    Methadone Urine Negative Negative    Opiate Urine Negative Negative    PCP Ur Negative Negative    THC Urine Negative Negative    Oxycodone Urine Negative Negative    Fentanyl Urine Negative Negative    HYDROCODONE URINE Negative Negative   Comprehensive metabolic panel    Collection Time: 07/03/24  4:30 PM   Result Value Ref Range    Sodium 140 135 - 147 mmol/L    Potassium 3.7 3.5 - 5.3 mmol/L    Chloride 112 (H) 96 - 108 mmol/L    CO2 19 (L) 21 - 32 mmol/L    ANION GAP 9 4 - 13 mmol/L    BUN 20 5 - 25 mg/dL    Creatinine 0.80 0.60 - 1.30 mg/dL    Glucose 92 65 - 140 mg/dL    Calcium 9.7 8.4 - 10.2 mg/dL    AST 13 13 - 39 U/L    ALT 16 7 - 52 U/L    Alkaline Phosphatase 78 34 - 104 U/L    Total Protein 7.0 6.4 - 8.4 g/dL    Albumin 4.2 3.5 - 5.0 g/dL    Total Bilirubin 0.27 0.20 - 1.00 mg/dL    eGFR 93 ml/min/1.73sq m   CBC and differential    Collection Time: 07/03/24  4:30 PM   Result Value Ref Range    WBC 10.55 (H) 4.31 - 10.16 Thousand/uL    RBC 5.00 3.81  - 5.12 Million/uL    Hemoglobin 14.2 11.5 - 15.4 g/dL    Hematocrit 45.8 34.8 - 46.1 %    MCV 92 82 - 98 fL    MCH 28.4 26.8 - 34.3 pg    MCHC 31.0 (L) 31.4 - 37.4 g/dL    RDW 13.3 11.6 - 15.1 %    MPV 9.6 8.9 - 12.7 fL    Platelets 331 149 - 390 Thousands/uL    nRBC 0 /100 WBCs    Segmented % 66 43 - 75 %    Immature Grans % 0 0 - 2 %    Lymphocytes % 24 14 - 44 %    Monocytes % 7 4 - 12 %    Eosinophils Relative 2 0 - 6 %    Basophils Relative 1 0 - 1 %    Absolute Neutrophils 6.95 1.85 - 7.62 Thousands/µL    Absolute Immature Grans 0.04 0.00 - 0.20 Thousand/uL    Absolute Lymphocytes 2.52 0.60 - 4.47 Thousands/µL    Absolute Monocytes 0.72 0.17 - 1.22 Thousand/µL    Eosinophils Absolute 0.25 0.00 - 0.61 Thousand/µL    Basophils Absolute 0.07 0.00 - 0.10 Thousands/µL   Lithium level    Collection Time: 07/03/24  4:30 PM   Result Value Ref Range    Lithium Lvl <0.10 (L) 0.60 - 1.20 mmol/L   TSH, 3rd generation with Free T4 reflex    Collection Time: 07/03/24  4:30 PM   Result Value Ref Range    TSH 3RD GENERATON 0.405 (L) 0.450 - 4.500 uIU/mL   T4, free    Collection Time: 07/03/24  4:30 PM   Result Value Ref Range    Free T4 0.73 0.61 - 1.12 ng/dL   POCT pregnancy, urine    Collection Time: 07/04/24  9:33 AM   Result Value Ref Range    EXT Preg Test, Ur Negative     Control Valid    CBC and differential    Collection Time: 07/05/24  5:42 AM   Result Value Ref Range    WBC 8.03 4.31 - 10.16 Thousand/uL    RBC 4.73 3.81 - 5.12 Million/uL    Hemoglobin 13.4 11.5 - 15.4 g/dL    Hematocrit 43.4 34.8 - 46.1 %    MCV 92 82 - 98 fL    MCH 28.3 26.8 - 34.3 pg    MCHC 30.9 (L) 31.4 - 37.4 g/dL    RDW 13.3 11.6 - 15.1 %    MPV 10.8 8.9 - 12.7 fL    Platelets 276 149 - 390 Thousands/uL    nRBC 0 /100 WBCs    Segmented % 57 43 - 75 %    Immature Grans % 0 0 - 2 %    Lymphocytes % 31 14 - 44 %    Monocytes % 7 4 - 12 %    Eosinophils Relative 4 0 - 6 %    Basophils Relative 1 0 - 1 %    Absolute Neutrophils 4.57 1.85 - 7.62  Thousands/µL    Absolute Immature Grans 0.03 0.00 - 0.20 Thousand/uL    Absolute Lymphocytes 2.50 0.60 - 4.47 Thousands/µL    Absolute Monocytes 0.54 0.17 - 1.22 Thousand/µL    Eosinophils Absolute 0.33 0.00 - 0.61 Thousand/µL    Basophils Absolute 0.06 0.00 - 0.10 Thousands/µL        Progress Toward Goals & Illness Status: Patient is not at goal. They are psychiatrically unstable and are not yet ready for discharge. The patient's condition currently requires active psychopharmacological medication management, interdisciplinary coordination with case management, and the utilization of adjunctive milieu and group therapy to augment psychopharmacological efficacy. The patient's risk of morbidity, and progression or decompensation of psychiatric disease, is high without this current treatment.           -----------------------------------    Risks / Benefits of Treatment:     Risks, benefits, and possible side effects of medications explained to patient. The patient verbalizes understanding and agreement for treatment.     Counseling / Coordination of Care:     Patient's presentation on admission and proposed treatment plan were discussed with the treatment team.  Diagnosis, medication changes and treatment plan were reviewed with the patient.  Recent stressors were discussed with the patient.  Events leading to admission were reviewed with the patient.  Importance of medication and treatment compliance was reviewed with the patient.          Inpatient Psychiatric Certification:     Certification: Based upon physical, mental and social evaluations, I certify that inpatient psychiatric services are medically necessary for this patient for a duration of 5-7 midnights (exact duration TBD pending patient's response to psychiatric treatment) for the diagnosis listed above.     Available alternative community resources do not meet the patient's mental health care needs.  I further attest that an established written  individualized plan of care has been implemented and is outlined in the patient's medical records.        This note has been constructed using a voice recognition system. There may be translation, syntax, or grammatical errors. If you have any questions, please contact the dictating provider.

## 2024-07-05 NOTE — NURSING NOTE
Cooperative, visible on unit. Reports ongoing anxiety, depression, and paranoia. Patient cites various life stressors including filing for bankruptcy, a breakup, housing instability, death in the family, and giving up her dog of 10 years to an organization. Patient cites the onset of her mental health difficulties after her father's death to COVID and her stepfather's death to stomach cancer. Patient also states her mother's health declined rapidly after her father's death and believes her mother now has some form of dementia. Patient struggles with severe anhedonia and isolation. Patient states she does not even leave the house for groceries, instead, she gets them delivered. Patient unable to find enjoyment in previously enjoyable activities like watching television. Patient also unable to articulate any positive coping skills. However, patient does meet weekly with a therapist who she enjoys and trusts. RN and patient discussed trying different coping skills including listening to music, exercising, journaling. Denies SI/HI/AVH and encouraged to approach staff if ideations occur. Medication compliant. Made progress towards treatment goals as evidenced by positive group attendance. Plan of care ongoing.

## 2024-07-05 NOTE — NURSING NOTE
RN will meet with patient at least twice per day to assess for any concerns, teach about prescribed medications and diagnosis. Patient will be taught and encouraged to utilize healthy coping skills.

## 2024-07-06 PROCEDURE — 99232 SBSQ HOSP IP/OBS MODERATE 35: CPT | Performed by: PHYSICIAN ASSISTANT

## 2024-07-06 RX ADMIN — CYCLOSPORINE 1 DROP: 0 SOLUTION/ DROPS OPHTHALMIC; TOPICAL at 08:25

## 2024-07-06 RX ADMIN — GABAPENTIN 900 MG: 300 CAPSULE ORAL at 21:13

## 2024-07-06 RX ADMIN — MIRABEGRON 25 MG: 25 TABLET, FILM COATED, EXTENDED RELEASE ORAL at 08:24

## 2024-07-06 RX ADMIN — ACETAZOLAMIDE 500 MG: 250 TABLET ORAL at 08:21

## 2024-07-06 RX ADMIN — B-COMPLEX W/ C & FOLIC ACID TAB 1 TABLET: TAB at 08:23

## 2024-07-06 RX ADMIN — PANTOPRAZOLE SODIUM 20 MG: 20 TABLET, DELAYED RELEASE ORAL at 06:39

## 2024-07-06 RX ADMIN — LINACLOTIDE 1 CAPSULE: 290 CAPSULE, GELATIN COATED ORAL at 08:21

## 2024-07-06 RX ADMIN — ATOGEPANT 60 MG: 60 TABLET ORAL at 08:25

## 2024-07-06 RX ADMIN — TOPIRAMATE 150 MG: 25 TABLET, FILM COATED ORAL at 17:11

## 2024-07-06 RX ADMIN — IPRATROPIUM BROMIDE 2 SPRAY: 42 SPRAY, METERED NASAL at 08:31

## 2024-07-06 RX ADMIN — ALUMINUM HYDROXIDE, MAGNESIUM HYDROXIDE, AND SIMETHICONE 30 ML: 1200; 120; 1200 SUSPENSION ORAL at 15:33

## 2024-07-06 RX ADMIN — METFORMIN HYDROCHLORIDE 500 MG: 500 TABLET ORAL at 08:23

## 2024-07-06 RX ADMIN — ACETAZOLAMIDE 500 MG: 250 TABLET ORAL at 17:11

## 2024-07-06 RX ADMIN — CELECOXIB 200 MG: 100 CAPSULE ORAL at 17:11

## 2024-07-06 RX ADMIN — FOLIC ACID 1 MG: 1 TABLET ORAL at 08:23

## 2024-07-06 RX ADMIN — LORATADINE 10 MG: 10 TABLET ORAL at 08:23

## 2024-07-06 RX ADMIN — LEVOTHYROXINE SODIUM 112 MCG: 112 TABLET ORAL at 06:39

## 2024-07-06 RX ADMIN — IPRATROPIUM BROMIDE 2 SPRAY: 42 SPRAY, METERED NASAL at 21:14

## 2024-07-06 RX ADMIN — CYCLOSPORINE 1 DROP: 0 SOLUTION/ DROPS OPHTHALMIC; TOPICAL at 21:14

## 2024-07-06 RX ADMIN — GABAPENTIN 300 MG: 300 CAPSULE ORAL at 08:22

## 2024-07-06 RX ADMIN — SPIRONOLACTONE 25 MG: 25 TABLET ORAL at 08:20

## 2024-07-06 RX ADMIN — PANTOPRAZOLE SODIUM 20 MG: 20 TABLET, DELAYED RELEASE ORAL at 16:10

## 2024-07-06 RX ADMIN — CARIPRAZINE 6 MG: 3 CAPSULE, GELATIN COATED ORAL at 08:21

## 2024-07-06 RX ADMIN — TOPIRAMATE 150 MG: 25 TABLET, FILM COATED ORAL at 08:22

## 2024-07-06 RX ADMIN — CELECOXIB 200 MG: 100 CAPSULE ORAL at 08:20

## 2024-07-06 RX ADMIN — Medication 5000 UNITS: at 08:19

## 2024-07-06 NOTE — PROGRESS NOTES
"This note was not shared with the patient due to reasonable likelihood of causing patient harm    Progress Note - Behavioral Health     Gita CALHOUN Stack 38 y.o. female MRN: [id number]   Unit/Bed#: -02 Encounter: [id number]    Gita is feels \"not bad\" , appears depressed, is cooperative with staff, pleasant, and polite today. Stays in the room during the day. Limited participation in milieu. Limited group attendance. Social with roommate.    Assessment & Plan   Principal Problem:    Severe episode of recurrent major depressive disorder, without psychotic features ([facility])  Active Problems:    Obsessive-compulsive disorder    Hypertension    Hypothyroidism    Overactive bladder    Binge-eating disorder, severe    Gambling disorder, moderate    Esophageal reflux    EILEEN (generalized anxiety disorder)    Lumbar degenerative disc disease    Morbid obesity (HCC)    PTSD (post-traumatic stress disorder)    Urgency incontinence    Vitamin D deficiency    Iliotibial band syndrome of left side    Piriformis syndrome of left side    Skin lesion    Prediabetes    Family history of cancer    Chronic migraine without aura without status migrainosus, not intractable    CAIO (obstructive sleep apnea)    Cervicalgia    Numbness and tingling in both hands    Dermatitis    Chronic constipation    Thyroid disease neuropathy (HCC)    Migraine headache    Medical clearance for psychiatric admission       Progress Toward Goals: remained normal. No significant changes in behaviors or clinical status over the last 24 hours.  Gita will continue working on current treatment goals which include:  1.Continue with group therapy, milieu therapy and occupational therapy  2.Behavioral Health checks every 7 minutes  3.Continue frequent safety checks and vitals per unit protocol  4.Continue with [facility] medical management as indicated  5.The patient will be maintained on current medications  6. Disposition Planning: Discharge planning and " "efforts remain ongoing per primary treatment teams recommendatio    Patient was seen today for continuation of care, records reviewed and patient was discussed with the charge nurse.  No behavioral outbursts or acute events noted overnight and No significant changes in behaviors or clinical status over the last 24 hours.      Gita, \"Elizabeth\", was seen today while on the unit. She reports her mood is \"not bad\" today but admits to some anxiety. She denies current depression but states that she feels a lot of her depression is \"situational\" being at home due to finances and being alone so often. She denies SI/HI/AVH. She reports sleeping well. She reports poor appetite but states she feels this is from the Wegovy she is on. She reports that she is trying to \"eat more\" while she is here since she just wouldn't cook for herself while at home. Nursing reports pt has stated she was paranoid yesterday but has not displayed any paranoid behaviors. Pt denies today. She is sad because she had to re-home her dog to a senior rescue because of her financial situation and \"I needed to be hospitalized\". She states \"I know I'll be sad again when I leave, but I always feel better here.\" Pts only current complaint is that she feels pantoprazole is not as effective as her home omeprazole causing her to \"burp\" more. Nursing reports she attends some groups, often stays in her room, but is social with roommate.       Gita does appear overtly anxious or depressed.    She denies any suicidal ideation, intent or plan at present; denies any homicidal ideation, intent or plan at present.  She denies any auditory hallucinations, denies any visual hallucinations, denies any delusions.  She denies any side effects from current psychiatric medications.  No medication changes indicated at this time, continue current medication regimen.    Psychiatric Review of Systems:  Behavior over the last 24 hours: Unchanged  Sleep: sleeping okay " "throughout the night  Appetite: suboptimal  Medication side effects: none reported  ROS:reports \"burping\", all other systems are negative    Mental Status Evaluation:    Appearance:  adequate grooming, disheveled, looks stated age, overweight, wearing pajamas   Behavior:  pleasant, cooperative, calm, interacts appropriately with this writer   Speech:  normal rate, normal volume, normal pitch   Mood:  \"Not bad\", appears anxious and depressed   Affect:  constricted   Thought Process:  organized, goal directed, linear   Associations: intact associations   Thought Content:  no overt delusions, no paranoia noted on exam   Perceptual Disturbances: denies auditory or visual hallucinations when asked, does not appear responding to internal stimuli   Risk Potential: Suicidal ideation - None at present  Homicidal ideation - None at present  Potential for aggression - Not at present   Sensorium:  oriented to person, place, and time/date   Memory:  recent and remote memory grossly intact   Consciousness:  alert and awake   Attention/Concentration: attention span and concentration are age appropriate   Insight:  fair   Judgment: fair   Gait/Station: normal gait/station   Motor Activity: no abnormal movements     Vital signs in last 24 hours:    Temp:  [97.8 °F (36.6 °C)-98.2 °F (36.8 °C)] 97.9 °F (36.6 °C)  HR:  [60-66] 60  Resp:  [17-18] 18  BP: ()/(79-84) 99/84    Laboratory results: I have personally reviewed all pertinent laboratory/tests results    Discharge Disposition: Discharge disposition and planning remain ongoing    Current Facility-Administered Medications   Medication Dose Route Frequency Provider Last Rate    acetaminophen  650 mg Oral Q4H PRN Jordan C Holter, DO      acetaminophen  975 mg Oral Q6H PRN Jordan C Holter, DO      acetaZOLAMIDE  500 mg Oral BID Jordan C Holter, DO      almotriptan  12.5 mg Oral Once PRN Jordan C Holter, DO      aluminum-magnesium hydroxide-simethicone  30 mL Oral Q4H PRN Satnam"  Holter, DO      Atogepant  60 mg Oral Daily [facility] Holter, DO      benztropine  1 mg Intramuscular Q4H PRN Max 6/day [facility] Holter, DO      bisacodyl  10 mg Rectal Daily PRN [facility] Holter, DO      cariprazine  6 mg Oral Daily [facility] Holter, DO      celecoxib  200 mg Oral BID [facility] Holter, DO      Cholecalciferol  5,000 Units Oral Daily [facility] Holter, DO      cycloSPORINE (PF)  1 drop Ophthalmic BID [facility] Holter, DO      hydrOXYzine HCL  50 mg Oral Q6H PRN Max 4/day [facility] Holter, DO      Or    diphenhydrAMINE  50 mg Intramuscular Q6H PRN [facility] Holter, DO      folic acid  1 mg Oral Daily [facility] Holter, DO      gabapentin  300 mg Oral After Breakfast [facility] Holter, DO      gabapentin  900 mg Oral HS [facility] Holter, DO      hydrocortisone   Topical BID [facility] Holter, DO      hydrOXYzine HCL  100 mg Oral Q6H PRN Max 4/day [facility] Holter, DO      Or    LORazepam  2 mg Intramuscular Q6H PRN [facility] Holter, DO      hydrOXYzine HCL  25 mg Oral Q6H PRN Max 4/day [facility] Holter, DO      ipratropium  2 spray Each Nare Q12H YFN [facility] Holter, DO      levothyroxine  112 mcg Oral Early Morning [facility] Holter, DO      linaCLOtide  290 mcg Oral Daily Nancy Villa PA-C      loratadine  10 mg Oral Daily [facility] Holter, DO      melatonin  3 mg Oral HS PRN [facility] Holter, DO      metFORMIN  500 mg Oral Daily With Breakfast [facility] Holter, DO      Mirabegron ER  25 mg Oral Daily [facility] Holter, DO      multivitamin stress formula  1 tablet Oral Daily [facility] Holter, DO      OLANZapine  5 mg Oral Q4H PRN Max 3/day [facility] Holter, DO      Or    OLANZapine  2.5 mg Intramuscular Q4H PRN Max 3/day [facility] Holter, DO      OLANZapine  5 mg Oral Q3H PRN Max 3/day [facility] Holter, DO      Or    OLANZapine  5 mg Intramuscular Q3H PRN Max 3/day [facility] Holter, DO      OLANZapine  2.5 mg Oral Q4H PRN Max 6/day [facility] Holter, DO      pantoprazole  20 mg Oral BID AC [facility] Holter, DO      polyethylene  glycol  17 g Oral Daily PRN Satnam Baughter, DO      prochlorperazine  10 mg Oral BID PRN Jordan C Holter, DO      propranolol  10 mg Oral Daily Jordan C Holter,       [START ON 7/11/2024] Semaglutide-Weight Management  1.7 mg Subcutaneous Weekly Jordan C Holter, DO      senna-docusate sodium  1 tablet Oral Daily PRN Jordan C Holter, DO      spironolactone  25 mg Oral Daily Jordan C Holter, DO      topiramate  150 mg Oral BID Jordan C Holter, DO       Risks / Benefits of Treatment:    Risks, benefits, and possible side effects of medications explained to patient and patient verbalizes understanding and agreement for treatment.    Counseling / Coordination of Care:    Total floor / unit time spent today 30 minutes. Greater than 50% of total time was spent with the patient and / or family counseling and / or coordination of care. A description of counseling / coordination of care:  Patient's progress reviewed with nursing staff.  Medications, treatment progress and treatment plan reviewed with patient.  Supportive therapy provided to patient.  Reassurance and supportive therapy provided.  Group attendance encouraged.  Encouraged participation in milieu and group therapy on the unit.    Jorge Robles 07/06/24

## 2024-07-06 NOTE — NURSING NOTE
Calm, cooperative, visible on unit. Observed in dayroom, watching television. Patient states PTA she had poor concentration, anhedonia, and was unable to watch television. Today, patient notes improved concentration and mood. Patient states she was able to watch two episodes. Patient denies SI/HI/AVH and encouraged to approach staff if ideations occur. However, patient fearful that when she returns home her mood and concentration will return to baseline due to various life stressors. RN and patient discussed making a list of topics patient would like to discuss in preparation for meeting with . Patient agreeable. Patient's goal for the day is to attend more groups, as yesterday she only attended one. Patient continues to endorse poor appetite. RN encouraged patient to take small bites of each meal. Plan of care ongoing. Denies unmet needs.

## 2024-07-06 NOTE — NURSING NOTE
"Patient is found in her room lying down.Upon waking patient states that she feels \"very groggy, just not herself. I don't understand why I'm so tired today.\" She was able to sit up and have a small conversation but multiple times lost her train of thought. She feels that her \"state of mind is due to her medications being screwed up over the last couple days.\" She denies SI/HI/AVH, but endorses high anxiety and depression. She states that she \"doesn't know what she is going to do, because the doctor doesn't think a medication change is going to help because her problems are situational.\" She appears depressed, but brightens after talking a little bit. Has been withdrawn to her room part of the day,   "

## 2024-07-06 NOTE — PLAN OF CARE
Problem: Ineffective Coping  Goal: Cooperates with admission process  Description: Interventions:   - Complete admission process  Outcome: Progressing  Goal: Identifies ineffective coping skills  Outcome: Progressing  Goal: Identifies healthy coping skills  Outcome: Progressing  Goal: Participates in unit activities  Description: Interventions:  - Provide therapeutic environment   - Provide required programming   - Redirect inappropriate behaviors   Outcome: Progressing     Problem: Depression  Goal: Treatment Goal: Demonstrate behavioral control of depressive symptoms, verbalize feelings of improved mood/affect, and adopt new coping skills prior to discharge  Outcome: Progressing  Goal: Verbalize thoughts and feelings  Description: Interventions:  - Assess and re-assess patient's level of risk   - Engage patient in 1:1 interactions, daily, for a minimum of 15 minutes   - Encourage patient to express feelings, fears, frustrations, hopes   Outcome: Progressing  Goal: Refrain from isolation  Description: Interventions:  - Develop a trusting relationship   - Encourage socialization   Outcome: Progressing  Goal: Attend and participate in unit activities, including therapeutic, recreational, and educational groups  Description: Interventions:  - Provide therapeutic and educational activities daily, encourage attendance and participation, and document same in the medical record   Outcome: Progressing  Goal: Complete daily ADLs, including personal hygiene independently, as able  Description: Interventions:  - Observe, teach, and assist patient with ADLS  -  Monitor and promote a balance of rest/activity, with adequate nutrition and elimination   Outcome: Progressing     Problem: Anxiety  Goal: Anxiety is at manageable level  Description: Interventions:  - Assess and monitor patient's anxiety level.   - Monitor for signs and symptoms (heart palpitations, chest pain, shortness of breath, headaches, nausea, feeling jumpy,  restlessness, irritable, apprehensive).   - Collaborate with interdisciplinary team and initiate plan and interventions as ordered.  - [place] patient to unit/surroundings  - Explain treatment plan  - Encourage participation in care  - Encourage verbalization of concerns/fears  - Identify coping mechanisms  - Assist in developing anxiety-reducing skills  - Administer/offer alternative therapies  - Limit or eliminate stimulants  Outcome: Progressing     Problem: DEPRESSION  Goal: Will be euthymic at discharge  Description: INTERVENTIONS:  - Administer medication as ordered  - Provide emotional support via 1:1 interaction with staff  - Encourage involvement in milieu/groups/activities  - Monitor for social isolation  Outcome: Progressing     Problem: Nutrition/Hydration-ADULT  Goal: Nutrient/Hydration intake appropriate for improving, restoring or maintaining nutritional needs  Description: Monitor and assess patient's nutrition/hydration status for malnutrition. Collaborate with interdisciplinary team and initiate plan and interventions as ordered.  Monitor patient's weight and dietary intake as ordered or per policy. Utilize nutrition screening tool and intervene as necessary. Determine patient's food preferences and provide high-protein, high-caloric foods as appropriate.     INTERVENTIONS:  - Monitor oral intake, urinary output, labs, and treatment plans  - Assess nutrition and hydration status and recommend course of action  - Evaluate amount of meals eaten  - Assist patient with eating if necessary   - Allow adequate time for meals  - Recommend/ encourage appropriate diets, oral nutritional supplements, and vitamin/mineral supplements  - Order, calculate, and assess calorie counts as needed  - Recommend, monitor, and adjust tube feedings and TPN/PPN based on assessed needs  - Assess need for intravenous fluids  - Provide specific nutrition/hydration education as appropriate  - Include patient/family/caregiver in  decisions related to nutrition  Outcome: Progressing     Problem: DISCHARGE PLANNING  Goal: Discharge to home or other facility with appropriate resources  Description: INTERVENTIONS:  - Identify barriers to discharge w/patient and caregiver  - Arrange for needed discharge resources and transportation as appropriate  - Identify discharge learning needs (meds, wound care, etc.)  - Arrange for interpretive services to assist at discharge as needed  - Refer to Case Management Department for coordinating discharge planning if the patient needs post-hospital services based on physician/advanced practitioner order or complex needs related to functional status, cognitive ability, or social support system  Outcome: Progressing

## 2024-07-07 PROCEDURE — 99232 SBSQ HOSP IP/OBS MODERATE 35: CPT | Performed by: PHYSICIAN ASSISTANT

## 2024-07-07 RX ORDER — PANTOPRAZOLE SODIUM 40 MG/1
40 TABLET, DELAYED RELEASE ORAL
Status: DISCONTINUED | OUTPATIENT
Start: 2024-07-07 | End: 2024-07-12 | Stop reason: HOSPADM

## 2024-07-07 RX ADMIN — ACETAZOLAMIDE 500 MG: 250 TABLET ORAL at 17:14

## 2024-07-07 RX ADMIN — ATOGEPANT 60 MG: 60 TABLET ORAL at 08:08

## 2024-07-07 RX ADMIN — CARIPRAZINE 6 MG: 3 CAPSULE, GELATIN COATED ORAL at 08:04

## 2024-07-07 RX ADMIN — MIRABEGRON 25 MG: 25 TABLET, FILM COATED, EXTENDED RELEASE ORAL at 08:07

## 2024-07-07 RX ADMIN — TOPIRAMATE 150 MG: 25 TABLET, FILM COATED ORAL at 08:04

## 2024-07-07 RX ADMIN — GABAPENTIN 300 MG: 300 CAPSULE ORAL at 08:05

## 2024-07-07 RX ADMIN — LORATADINE 10 MG: 10 TABLET ORAL at 08:03

## 2024-07-07 RX ADMIN — FOLIC ACID 1 MG: 1 TABLET ORAL at 08:06

## 2024-07-07 RX ADMIN — B-COMPLEX W/ C & FOLIC ACID TAB 1 TABLET: TAB at 08:05

## 2024-07-07 RX ADMIN — CYCLOSPORINE 1 DROP: 0 SOLUTION/ DROPS OPHTHALMIC; TOPICAL at 08:08

## 2024-07-07 RX ADMIN — ALUMINUM HYDROXIDE, MAGNESIUM HYDROXIDE, AND SIMETHICONE 30 ML: 1200; 120; 1200 SUSPENSION ORAL at 19:35

## 2024-07-07 RX ADMIN — TOPIRAMATE 150 MG: 25 TABLET, FILM COATED ORAL at 17:12

## 2024-07-07 RX ADMIN — PANTOPRAZOLE SODIUM 20 MG: 20 TABLET, DELAYED RELEASE ORAL at 06:08

## 2024-07-07 RX ADMIN — IPRATROPIUM BROMIDE 2 SPRAY: 42 SPRAY, METERED NASAL at 21:34

## 2024-07-07 RX ADMIN — CYCLOSPORINE 1 DROP: 0 SOLUTION/ DROPS OPHTHALMIC; TOPICAL at 21:34

## 2024-07-07 RX ADMIN — CELECOXIB 200 MG: 100 CAPSULE ORAL at 08:05

## 2024-07-07 RX ADMIN — GABAPENTIN 900 MG: 300 CAPSULE ORAL at 21:33

## 2024-07-07 RX ADMIN — Medication 5000 UNITS: at 08:03

## 2024-07-07 RX ADMIN — LEVOTHYROXINE SODIUM 112 MCG: 112 TABLET ORAL at 06:08

## 2024-07-07 RX ADMIN — SPIRONOLACTONE 25 MG: 25 TABLET ORAL at 08:06

## 2024-07-07 RX ADMIN — METFORMIN HYDROCHLORIDE 500 MG: 500 TABLET ORAL at 08:04

## 2024-07-07 RX ADMIN — PANTOPRAZOLE SODIUM 40 MG: 40 TABLET, DELAYED RELEASE ORAL at 16:09

## 2024-07-07 RX ADMIN — CELECOXIB 200 MG: 100 CAPSULE ORAL at 17:13

## 2024-07-07 RX ADMIN — PROPRANOLOL HYDROCHLORIDE 10 MG: 10 TABLET ORAL at 08:04

## 2024-07-07 RX ADMIN — LINACLOTIDE 1 CAPSULE: 290 CAPSULE, GELATIN COATED ORAL at 08:07

## 2024-07-07 RX ADMIN — HYDROXYZINE HYDROCHLORIDE 50 MG: 50 TABLET, FILM COATED ORAL at 12:52

## 2024-07-07 RX ADMIN — ACETAZOLAMIDE 500 MG: 250 TABLET ORAL at 08:05

## 2024-07-07 RX ADMIN — IPRATROPIUM BROMIDE 2 SPRAY: 42 SPRAY, METERED NASAL at 08:09

## 2024-07-07 NOTE — NURSING NOTE
Patient in hallway, crying. Reports feeling overwhelmed. Patient states she does not wish to return home. Patient also found a screw on the unit and returned to RN. Informed RN she had urges to hurt herself with it. RN thanked patient for informing staff. RN verbally deescalated patient. @12:52 RN administered Atarax 50 mg PO for moderate anxiety (Brothers=24). Upon follow-up, patient endorses medication effectiveness.

## 2024-07-07 NOTE — TREATMENT TEAM
07/06/24 0742   Team Meeting   Meeting Type Daily Rounds   Team Members Present   Team Members Present Physician;Nurse   Physician Team Member Dr. Winn / Fernie   Nursing Team Member Brooklynn   Patient/Family Present   Patient Present No   Patient's Family Present No     Rounds:  pt reports anxiety, depression, paranoia, does not appear paranoid. Some groups, pleasant, social with roommate. Denies SI, HI, AVH.  
   07/07/24 0800   Team Meeting   Meeting Type Daily Rounds   Team Members Present   Team Members Present Physician;Nurse   Physician Team Member Fernie   Nursing Team Member Fanny   Patient/Family Present   Patient Present No   Patient's Family Present No     Daily Rounds: Pt is med focused, somatic.  Elevated anxiety r/t living situation.  Attended PM group.  Denies SI/HI/AVH.  Inderal help for low BP.  
133

## 2024-07-07 NOTE — PLAN OF CARE
Problem: Ineffective Coping  Goal: Cooperates with admission process  Description: Interventions:   - Complete admission process  Outcome: Progressing  Goal: Identifies ineffective coping skills  Outcome: Progressing  Goal: Identifies healthy coping skills  Outcome: Progressing  Goal: Participates in unit activities  Description: Interventions:  - Provide therapeutic environment   - Provide required programming   - Redirect inappropriate behaviors   Outcome: Progressing     Problem: Depression  Goal: Treatment Goal: Demonstrate behavioral control of depressive symptoms, verbalize feelings of improved mood/affect, and adopt new coping skills prior to discharge  Outcome: Progressing  Goal: Verbalize thoughts and feelings  Description: Interventions:  - Assess and re-assess patient's level of risk   - Engage patient in 1:1 interactions, daily, for a minimum of 15 minutes   - Encourage patient to express feelings, fears, frustrations, hopes   Outcome: Progressing  Goal: Refrain from isolation  Description: Interventions:  - Develop a trusting relationship   - Encourage socialization   Outcome: Progressing  Goal: Attend and participate in unit activities, including therapeutic, recreational, and educational groups  Description: Interventions:  - Provide therapeutic and educational activities daily, encourage attendance and participation, and document same in the medical record   Outcome: Progressing  Goal: Complete daily ADLs, including personal hygiene independently, as able  Description: Interventions:  - Observe, teach, and assist patient with ADLS  -  Monitor and promote a balance of rest/activity, with adequate nutrition and elimination   Outcome: Progressing     Problem: Anxiety  Goal: Anxiety is at manageable level  Description: Interventions:  - Assess and monitor patient's anxiety level.   - Monitor for signs and symptoms (heart palpitations, chest pain, shortness of breath, headaches, nausea, feeling jumpy,  restlessness, irritable, apprehensive).   - Collaborate with interdisciplinary team and initiate plan and interventions as ordered.  - [place] patient to unit/surroundings  - Explain treatment plan  - Encourage participation in care  - Encourage verbalization of concerns/fears  - Identify coping mechanisms  - Assist in developing anxiety-reducing skills  - Administer/offer alternative therapies  - Limit or eliminate stimulants  Outcome: Progressing     Problem: DEPRESSION  Goal: Will be euthymic at discharge  Description: INTERVENTIONS:  - Administer medication as ordered  - Provide emotional support via 1:1 interaction with staff  - Encourage involvement in milieu/groups/activities  - Monitor for social isolation  Outcome: Progressing     Problem: Nutrition/Hydration-ADULT  Goal: Nutrient/Hydration intake appropriate for improving, restoring or maintaining nutritional needs  Description: Monitor and assess patient's nutrition/hydration status for malnutrition. Collaborate with interdisciplinary team and initiate plan and interventions as ordered.  Monitor patient's weight and dietary intake as ordered or per policy. Utilize nutrition screening tool and intervene as necessary. Determine patient's food preferences and provide high-protein, high-caloric foods as appropriate.     INTERVENTIONS:  - Monitor oral intake, urinary output, labs, and treatment plans  - Assess nutrition and hydration status and recommend course of action  - Evaluate amount of meals eaten  - Assist patient with eating if necessary   - Allow adequate time for meals  - Recommend/ encourage appropriate diets, oral nutritional supplements, and vitamin/mineral supplements  - Order, calculate, and assess calorie counts as needed  - Recommend, monitor, and adjust tube feedings and TPN/PPN based on assessed needs  - Assess need for intravenous fluids  - Provide specific nutrition/hydration education as appropriate  - Include patient/family/caregiver in  decisions related to nutrition  Outcome: Progressing

## 2024-07-07 NOTE — PROGRESS NOTES
"This note was not shared with the patient due to reasonable likelihood of causing patient harm    Progress Note - Behavioral Health     Gita CALHOUN Stack 38 y.o. female MRN: [id number]   Unit/Bed#: -02 Encounter: [id number]    Gita is feels \"okay\" , appears depressed, is cooperative with staff, pleasant, and polite today. Stays in the room during the day. Limited participation in milieu. Attending group last night per nursing. Social with roommate.    Assessment & Plan   Principal Problem:    Severe episode of recurrent major depressive disorder, without psychotic features ([facility])  Active Problems:    Obsessive-compulsive disorder    Hypertension    Hypothyroidism    Overactive bladder    Binge-eating disorder, severe    Gambling disorder, moderate    Esophageal reflux    EILEEN (generalized anxiety disorder)    Lumbar degenerative disc disease    Morbid obesity (HCC)    PTSD (post-traumatic stress disorder)    Urgency incontinence    Vitamin D deficiency    Iliotibial band syndrome of left side    Piriformis syndrome of left side    Skin lesion    Prediabetes    Family history of cancer    Chronic migraine without aura without status migrainosus, not intractable    CAIO (obstructive sleep apnea)    Cervicalgia    Numbness and tingling in both hands    Dermatitis    Chronic constipation    Thyroid disease neuropathy (HCC)    Migraine headache    Medical clearance for psychiatric admission       Progress Toward Goals: remained normal. No significant changes in behaviors or clinical status over the last 24 hours.  Gita will continue working on current treatment goals which include:  1.Continue with group therapy, milieu therapy and occupational therapy  2.Behavioral Health checks every 7 minutes  3.Continue frequent safety checks and vitals per unit protocol  4.Continue with SLIM medical management as indicated  5.The patient will be maintained on current medications with exception of increased dosing of " "pantoprazole 2/2 continued complaints of heartburn  6. Disposition Planning: Discharge planning and efforts remain ongoing per primary treatment teams recommendatio    Patient was seen today for continuation of care, records reviewed and patient was discussed with the charge nurse.  No behavioral outbursts or acute events noted overnight and No significant changes in behaviors or clinical status over the last 24 hours.      Gita, \"Elizabeth\", was seen today while on the unit. She reports that her mood is \"okay\" but \"a little down and more anxious\". She reports the anxiety is 2/2 \"life\". She reports sleeping well and a slightly improved appetite. She denies SI/HI/AVH. She continues to complain of heartburn and feels the omeprazole she used at home works better. Dosing optimized to 40 mg BID as pt reports she was on omeprazole 20 mg BID at home. Maalox already ordered as PRN for dyspepsia. Nursing reports she has remained anxious with living situation and remains med focused and somatic.     Gita does appear overtly anxious or depressed.    She denies any suicidal ideation, intent or plan at present; denies any homicidal ideation, intent or plan at present.  She denies any auditory hallucinations, denies any visual hallucinations, denies any delusions.  She denies any side effects from current psychiatric medications.  No medication changes indicated at this time, continue current medication regimen with exception of increased dosing of pantoprazole to 40 mg BID 2/2 continued complaints of heartburn.    Psychiatric Review of Systems:  Behavior over the last 24 hours: Unchanged  Sleep: sleeping okay throughout the night  Appetite: suboptimal  Medication side effects: none reported  ROS:reports heartburn, all other systems are negative    Mental Status Evaluation:    Appearance:  adequate grooming, looks stated age, overweight, wearing pajamas   Behavior:  pleasant, cooperative, calm, interacts appropriately with " "this writer   Speech:  normal rate and volume   Mood:  \"Okay but a little down and anxious\", appears anxious and depressed   Affect:  constricted   Thought Process:  organized, goal directed, linear   Associations: intact associations   Thought Content:  no overt delusions, no paranoia noted on exam   Perceptual Disturbances: denies auditory or visual hallucinations when asked, does not appear responding to internal stimuli   Risk Potential: Suicidal ideation - None at present  Homicidal ideation - None at present  Potential for aggression - Not at present   Sensorium:  oriented to person, place, and time/date   Memory:  recent and remote memory grossly intact   Consciousness:  alert and awake   Attention/Concentration: attention span and concentration are age appropriate   Insight:  fair   Judgment: fair   Gait/Station: normal gait/station   Motor Activity: no abnormal movements     Vital signs in last 24 hours:    Temp:  [98.2 °F (36.8 °C)-98.4 °F (36.9 °C)] 98.4 °F (36.9 °C)  HR:  [59-65] 65  Resp:  [18] 18  BP: (121-123)/(74-86) 121/86    Laboratory results: I have personally reviewed all pertinent laboratory/tests results    Discharge Disposition: Discharge disposition and planning remain ongoing    Current Facility-Administered Medications   Medication Dose Route Frequency Provider Last Rate    acetaminophen  650 mg Oral Q4H PRN Jordan C Holter, DO      acetaminophen  975 mg Oral Q6H PRN Jordan C Holter, DO      acetaZOLAMIDE  500 mg Oral BID Jordan C Holter, DO      almotriptan  12.5 mg Oral Once PRN Jordan C Holter, DO      aluminum-magnesium hydroxide-simethicone  30 mL Oral Q4H PRN Jordan C Holter, DO      Atogepant  60 mg Oral Daily Jordan C Holter, DO      benztropine  1 mg Intramuscular Q4H PRN Max 6/day Jordan C Holter, DO      bisacodyl  10 mg Rectal Daily PRN Jordan C Holter, DO      cariprazine  6 mg Oral Daily Jordan C Holter, DO      celecoxib  200 mg Oral BID Jordan C Holter, DO      " Cholecalciferol  5,000 Units Oral Daily [facility] Holter, DO      cycloSPORINE (PF)  1 drop Ophthalmic BID [facility] Holter, DO      hydrOXYzine HCL  50 mg Oral Q6H PRN Max 4/day [facility] Holter, DO      Or    diphenhydrAMINE  50 mg Intramuscular Q6H PRN [facility] Holter, DO      folic acid  1 mg Oral Daily [facility] Holter, DO      gabapentin  300 mg Oral After Breakfast [facility] Holter, DO      gabapentin  900 mg Oral HS [facility] Holter, DO      hydrocortisone   Topical BID [facility] Holter, DO      hydrOXYzine HCL  100 mg Oral Q6H PRN Max 4/day [facility] Holter, DO      Or    LORazepam  2 mg Intramuscular Q6H PRN [facility] Holter, DO      hydrOXYzine HCL  25 mg Oral Q6H PRN Max 4/day [facility] Holter, DO      ipratropium  2 spray Each Nare Q12H YFN [facility] Holter, DO      levothyroxine  112 mcg Oral Early Morning [facility] Holter, DO      linaCLOtide  290 mcg Oral Daily Nancy Villa PA-C      loratadine  10 mg Oral Daily [facility] Holter, DO      melatonin  3 mg Oral HS PRN [facility] Holter, DO      metFORMIN  500 mg Oral Daily With Breakfast [facility] Holter, DO      Mirabegron ER  25 mg Oral Daily [facility] Holter, DO      multivitamin stress formula  1 tablet Oral Daily [facility] Holter, DO      OLANZapine  5 mg Oral Q4H PRN Max 3/day [facility] Holter, DO      Or    OLANZapine  2.5 mg Intramuscular Q4H PRN Max 3/day [facility] Holter, DO      OLANZapine  5 mg Oral Q3H PRN Max 3/day [facility] Holter, DO      Or    OLANZapine  5 mg Intramuscular Q3H PRN Max 3/day [facility] Holter, DO      OLANZapine  2.5 mg Oral Q4H PRN Max 6/day [facility] Holter, DO      pantoprazole  20 mg Oral BID AC [facility] Holter, DO      polyethylene glycol  17 g Oral Daily PRN [facility] Holter, DO      prochlorperazine  10 mg Oral BID PRN [facility] Holter, DO      propranolol  10 mg Oral Daily [facility] Holter, DO      [START ON 7/11/2024] Semaglutide-Weight Management  1.7 mg Subcutaneous Weekly [facility] Holter, DO      senna-docusate sodium  1 tablet Oral  Daily PRN Jordan C Holter, DO      spironolactone  25 mg Oral Daily Jordan C Holter, DO      topiramate  150 mg Oral BID Jordan C Holter, DO       Risks / Benefits of Treatment:    Risks, benefits, and possible side effects of medications explained to patient and patient verbalizes understanding and agreement for treatment.    Counseling / Coordination of Care:    Patient's progress reviewed with nursing staff.  Medications, treatment progress and treatment plan reviewed with patient.  Reassurance and supportive therapy provided.  Group attendance encouraged.  Encouraged participation in milieu and group therapy on the unit.    Jorge Robles 07/07/24

## 2024-07-07 NOTE — NURSING NOTE
"Patient calm, cooperative and visible in the milieu this evening. Observed sitting on a bench in the hallway reading. Patient described her mood as \" anxious\" tonight. States is due to increased worrying all the time. Worries about being here and when she gets discharged home that she will be back to baseline as she does not feel supported in her current home by her ex boyfriend. Patient voiced her life circumstances that led her to this admission. Patient stated, that her father passed away three years ago. Her depression and anxiety initiated as she was extremely close to her father. After her father's passing her mother became sick and was hospitalized for months and is now in a skill nursing facility. Throughout that time her stepfather also passed away. Patient used to live with her parents and is now living with her ex boyfriend. Patient states she was extremely depressed, anhedonia, anorexia, increased anxiety, crying all the time, and isolating herself. She states to have an aunt and uncle that are great support systems for her and reside about 25 minutes away from her. Writer asked if they could be a temporary option for placement when she leaves here as she would benefit from positive placement and support systems? Patient stated, \" I never thought of that, it may be possible\". Encouraged patient to utilize her coping skills. Patient rates depression 5/10 and anxiety 8/10 today. Declines any antianxiety medication at this time. Denies any SI/HI/AVH, encouraged to approach staff with any suicidal or homicidal ideations. Denies any pain or discomfort. Endorses adequate sleep and appetite at this time.   "

## 2024-07-07 NOTE — NURSING NOTE
Calm, cooperative, visible on unit. Reports ongoing anxiety and minimal depression. Patient states her anxiety symptoms typically consist of racing thoughts, as she often becomes preoccupied about all the tasks she needs to accomplish. Patient also tends to forget about the good components in her life. Patient cites one support person as her brother. RN and patient disused making a list of her life's positive components that patient can reference when feeling anxious. Patient's largest stressor at the moment is housing. RN actively listened to patient express feelings and concerns. Denies SI/HI/AVH and encouraged to approach staff if ideations occur. Physical assessment WNL, patient self-reports last BM yesterday. Plan of care ongoing. Denies unmet needs.

## 2024-07-08 PROCEDURE — 99232 SBSQ HOSP IP/OBS MODERATE 35: CPT | Performed by: PSYCHIATRY & NEUROLOGY

## 2024-07-08 RX ORDER — DULOXETIN HYDROCHLORIDE 20 MG/1
20 CAPSULE, DELAYED RELEASE ORAL DAILY
Status: DISCONTINUED | OUTPATIENT
Start: 2024-07-08 | End: 2024-07-10

## 2024-07-08 RX ADMIN — MIRABEGRON 25 MG: 25 TABLET, FILM COATED, EXTENDED RELEASE ORAL at 10:05

## 2024-07-08 RX ADMIN — GABAPENTIN 300 MG: 300 CAPSULE ORAL at 10:08

## 2024-07-08 RX ADMIN — FOLIC ACID 1 MG: 1 TABLET ORAL at 10:09

## 2024-07-08 RX ADMIN — GABAPENTIN 900 MG: 300 CAPSULE ORAL at 21:40

## 2024-07-08 RX ADMIN — TOPIRAMATE 150 MG: 25 TABLET, FILM COATED ORAL at 17:05

## 2024-07-08 RX ADMIN — ACETAZOLAMIDE 500 MG: 250 TABLET ORAL at 17:05

## 2024-07-08 RX ADMIN — ATOGEPANT 60 MG: 60 TABLET ORAL at 10:05

## 2024-07-08 RX ADMIN — PANTOPRAZOLE SODIUM 40 MG: 40 TABLET, DELAYED RELEASE ORAL at 17:05

## 2024-07-08 RX ADMIN — CARIPRAZINE 6 MG: 3 CAPSULE, GELATIN COATED ORAL at 10:07

## 2024-07-08 RX ADMIN — CYCLOSPORINE 1 DROP: 0 SOLUTION/ DROPS OPHTHALMIC; TOPICAL at 21:42

## 2024-07-08 RX ADMIN — METFORMIN HYDROCHLORIDE 500 MG: 500 TABLET ORAL at 10:09

## 2024-07-08 RX ADMIN — DULOXETINE HYDROCHLORIDE 20 MG: 20 CAPSULE, DELAYED RELEASE ORAL at 10:08

## 2024-07-08 RX ADMIN — LINACLOTIDE 1 CAPSULE: 290 CAPSULE, GELATIN COATED ORAL at 10:14

## 2024-07-08 RX ADMIN — ACETAZOLAMIDE 500 MG: 250 TABLET ORAL at 10:07

## 2024-07-08 RX ADMIN — LORATADINE 10 MG: 10 TABLET ORAL at 10:09

## 2024-07-08 RX ADMIN — TOPIRAMATE 150 MG: 25 TABLET, FILM COATED ORAL at 10:07

## 2024-07-08 RX ADMIN — SPIRONOLACTONE 25 MG: 25 TABLET ORAL at 10:08

## 2024-07-08 RX ADMIN — LEVOTHYROXINE SODIUM 112 MCG: 112 TABLET ORAL at 06:14

## 2024-07-08 RX ADMIN — PROPRANOLOL HYDROCHLORIDE 10 MG: 10 TABLET ORAL at 10:09

## 2024-07-08 RX ADMIN — CELECOXIB 200 MG: 100 CAPSULE ORAL at 10:06

## 2024-07-08 RX ADMIN — IPRATROPIUM BROMIDE 2 SPRAY: 42 SPRAY, METERED NASAL at 21:41

## 2024-07-08 RX ADMIN — PANTOPRAZOLE SODIUM 40 MG: 40 TABLET, DELAYED RELEASE ORAL at 06:14

## 2024-07-08 RX ADMIN — CYCLOSPORINE 1 DROP: 0 SOLUTION/ DROPS OPHTHALMIC; TOPICAL at 10:05

## 2024-07-08 RX ADMIN — Medication 5000 UNITS: at 10:06

## 2024-07-08 RX ADMIN — CELECOXIB 200 MG: 100 CAPSULE ORAL at 17:05

## 2024-07-08 RX ADMIN — IPRATROPIUM BROMIDE 2 SPRAY: 42 SPRAY, METERED NASAL at 10:10

## 2024-07-08 RX ADMIN — B-COMPLEX W/ C & FOLIC ACID TAB 1 TABLET: TAB at 10:08

## 2024-07-08 NOTE — NURSING NOTE
Patient calm, pleasant and cooperative. Interviewed in bed, awake. Denies SI, HI and AVH. States she doesn't feel any better, still feels hopeless and depressed. Denies any unmet needs at this time. Encouraged to seek staff with questions or concerns.

## 2024-07-08 NOTE — PROGRESS NOTES
Progress Note - Behavioral Health   Gita Rendon 38 y.o. female MRN: [id number]  Unit/Bed#: [facility] 202-02 Encounter: [id number]    Assessment:  Principal Problem:    Severe episode of recurrent major depressive disorder, without psychotic features ([facility])  Active Problems:    Obsessive-compulsive disorder    Hypertension    Hypothyroidism    Overactive bladder    Binge-eating disorder, severe    Gambling disorder, moderate    Esophageal reflux    EILEEN (generalized anxiety disorder)    Lumbar degenerative disc disease    Morbid obesity (HCC)    PTSD (post-traumatic stress disorder)    Urgency incontinence    Vitamin D deficiency    Iliotibial band syndrome of left side    Piriformis syndrome of left side    Skin lesion    Prediabetes    Family history of cancer    Chronic migraine without aura without status migrainosus, not intractable    CAIO (obstructive sleep apnea)    Cervicalgia    Numbness and tingling in both hands    Dermatitis    Chronic constipation    Thyroid disease neuropathy (HCC)    Migraine headache    Medical clearance for psychiatric admission      Plan:  --Start Cymbalta 20mg PO QD  --Continue with psychiatric hospitalization  --Continue with individual, group, and milieu therapy  --Continue the following medications:  Current Facility-Administered Medications   Medication Dose Route Frequency    acetaminophen (TYLENOL) tablet 650 mg  650 mg Oral Q4H PRN    acetaminophen (TYLENOL) tablet 975 mg  975 mg Oral Q6H PRN    acetaZOLAMIDE (DIAMOX) tablet 500 mg  500 mg Oral BID    almotriptan (AXERT) tablet 12.5 mg  12.5 mg Oral Once PRN    aluminum-magnesium hydroxide-simethicone (MAALOX) oral suspension 30 mL  30 mL Oral Q4H PRN    Atogepant TABS 60 mg  60 mg Oral Daily    benztropine (COGENTIN) injection 1 mg  1 mg Intramuscular Q4H PRN Max 6/day    bisacodyl (DULCOLAX) rectal suppository 10 mg  10 mg Rectal Daily PRN    cariprazine (VRAYLAR) capsule 6 mg  6 mg Oral Daily    celecoxib (CeleBREX) capsule  200 mg  200 mg Oral BID    Cholecalciferol (VITAMIN D3) tablet 5,000 Units  5,000 Units Oral Daily    cycloSPORINE (PF) 0.09 % SOLN 1 drop  1 drop Ophthalmic BID    hydrOXYzine HCL (ATARAX) tablet 50 mg  50 mg Oral Q6H PRN Max 4/day    Or    diphenhydrAMINE (BENADRYL) injection 50 mg  50 mg Intramuscular Q6H PRN    DULoxetine (CYMBALTA) delayed release capsule 20 mg  20 mg Oral Daily    folic acid (FOLVITE) tablet 1 mg  1 mg Oral Daily    gabapentin (NEURONTIN) capsule 300 mg  300 mg Oral After Breakfast    gabapentin (NEURONTIN) capsule 900 mg  900 mg Oral HS    hydrocortisone (ANUSOL-HC) 2.5 % rectal cream   Topical BID    hydrOXYzine HCL (ATARAX) tablet 100 mg  100 mg Oral Q6H PRN Max 4/day    Or    LORazepam (ATIVAN) injection 2 mg  2 mg Intramuscular Q6H PRN    hydrOXYzine HCL (ATARAX) tablet 25 mg  25 mg Oral Q6H PRN Max 4/day    ipratropium (ATROVENT) 0.06 % nasal spray 2 spray  2 spray Each Nare Q12H YFN    levothyroxine tablet 112 mcg  112 mcg Oral Early Morning    linaCLOtide CAPS 1 capsule  290 mcg Oral Daily    loratadine (CLARITIN) tablet 10 mg  10 mg Oral Daily    melatonin tablet 3 mg  3 mg Oral HS PRN    metFORMIN (GLUCOPHAGE) tablet 500 mg  500 mg Oral Daily With Breakfast    Mirabegron ER TB24 25 mg  25 mg Oral Daily    multivitamin stress formula tablet 1 tablet  1 tablet Oral Daily    OLANZapine (ZyPREXA) tablet 5 mg  5 mg Oral Q4H PRN Max 3/day    Or    OLANZapine (ZyPREXA) IM injection 2.5 mg  2.5 mg Intramuscular Q4H PRN Max 3/day    OLANZapine (ZyPREXA) tablet 5 mg  5 mg Oral Q3H PRN Max 3/day    Or    OLANZapine (ZyPREXA) IM injection 5 mg  5 mg Intramuscular Q3H PRN Max 3/day    OLANZapine (ZyPREXA) tablet 2.5 mg  2.5 mg Oral Q4H PRN Max 6/day    pantoprazole (PROTONIX) EC tablet 40 mg  40 mg Oral BID AC    polyethylene glycol (MIRALAX) packet 17 g  17 g Oral Daily PRN    prochlorperazine (COMPAZINE) tablet 10 mg  10 mg Oral BID PRN    propranolol (INDERAL) tablet 10 mg  10 mg Oral Daily     [START ON 7/11/2024] Semaglutide-Weight Management (WEGOVY) subcutaneous 1.7 mg  1.7 mg Subcutaneous Weekly    senna-docusate sodium (SENOKOT S) 8.6-50 mg per tablet 1 tablet  1 tablet Oral Daily PRN    spironolactone (ALDACTONE) tablet 25 mg  25 mg Oral Daily    topiramate (TOPAMAX) tablet 150 mg  150 mg Oral BID       Subjective: Patient was seen for continuation of care. Chart was reviewed and discussed with treatment team.     Over the past 24 hours, patient has reported continued depression and anxiety. She has been crying in the hallways. She found a screw on the floor and handed it to staff, noting that she was worried about potential urges to harm herself, but was able to hand over the screw without incident.    Today, she is still anxious and depressed. She denies SI, HI, or AVH. Her depression is 5/10 in intensity. She wishes to start Cymbalta as discussed on Friday. She denies side effects to her current medications. We discussed potential side effects to Cymbalta.     Patient denied adverse effects to their psychiatric medication regimen. Patient denied other new or worsening psychiatric symptoms/complaints at this time. Discussed the importance of continuing to take medications as prescribed, as well as the importance of continuing to attend groups on the unit.     Psychiatric Review of Systems:  Medication adverse effects: none  Sleep: unchanged  Appetite: unchanged  Behaviors over the past 24 hours: unchanged    Vitals:  Vitals:    07/08/24 0732   BP: 122/82   Pulse: 80   Resp: 18   Temp: 97.9 °F (36.6 °C)   SpO2: 97%       Laboratory results:    I have personally reviewed all pertinent laboratory/tests results  No results found for this or any previous visit (from the past 48 hour(s)).     Current Medications:  Current medications as per above. All medications have been reviewed.   Risks, benefits, alternatives, and possible side effects of patient's psychiatric medications were discussed with  "patient.     Mental Status Evaluation:  Appearance: casually dressed, consistent with stated age  Motor: +psychomotor retardation, no gait abnormalities  Behavior: cooperative, answers questions appropriately  Speech: soft, normal rhythm  Mood: \"still depressed and anxious\"  Affect: constricted, depressed-appearing  Thought Process: linear and goal-oriented  Thought Content: denies auditory hallucinations, denies visual hallucinations, denies delusions  Risk Potential: denies suicidal ideation, plan, or intent. Denies homicidal ideation  Sensorium: Oriented to person, place, time, and situation  Cognition: cognitive ability appears intact but was not quantitatively tested  Consciousness: alert and awake  Attention: intact, able to focus without difficulty  Insight: fair  Judgement: limited      Progress Toward Goals & Illness Status: Patient is not at goal. They are not yet ready for discharge. The patient's condition currently requires active psychopharmacological medication management, interdisciplinary coordination with case management, and the utilization of adjunctive milieu and group therapy to augment psychopharmacological efficacy. The patient's risk of morbidity, and progression or decompensation of psychiatric disease, is higher without this current treatment.       This note has been constructed using a voice recognition system. There may be translation, syntax, or grammatical errors. If you have any questions, please contact the dictating provider.    "

## 2024-07-08 NOTE — PLAN OF CARE
Patient regularly attends groups and other unit activities.     Problem: Ineffective Coping  Goal: Participates in unit activities  Description: Interventions:  - Provide therapeutic environment   - Provide required programming   - Redirect inappropriate behaviors   Outcome: Progressing     Problem: Depression  Goal: Attend and participate in unit activities, including therapeutic, recreational, and educational groups  Description: Interventions:  - Provide therapeutic and educational activities daily, encourage attendance and participation, and document same in the medical record   Outcome: Progressing

## 2024-07-08 NOTE — NURSING NOTE
"Patient reports that PRN mylanta was effective for heartburn at this time, however she states that she feels like she \"may be getting a migraine\" because she \"feels so very tired and exhausted.\"  "

## 2024-07-08 NOTE — NURSING NOTE
"Elizabeth was observed resting in bed, with eyes open. This writer approached to introduce self and perform shift assessment, patient reported needing to use the restroom and then requested PRN Maalox. Elizabeth was then agreeable to sit privately and speak with this RN. Patient exhibits a flat affect, reports ongoing depressed mood, denies current SI/HI/AVH. Elizabeth reports struggling with depression for years now, cites increase in depression over the past several months. Patient reports her support system is limited, states, \"I have a brother, but his wife limits how much he can be there for me. My aunt and uncle have been supportive recently, but they are older now.\" Elizabeth reports ongoing depression and anxiety are related to the deaths of her father and step-father, as well as her mother's recent health struggles. Patient also reports having to place her ten year old dog in a senior rescue environment. Elizabeth cites her current living and financial situation as additional stressors, reports living with her ex-girlfriend, and describes this situation as frustrating, as the two do not engage socially. Elizabeth also reports struggling with the decision to file for bankruptcy. Patient expresses desire to discharge to a \"...long-term placement with staff present,\" encouraged to speak with provider and/or case management r/t discharge planning. Coping skills reviewed with patient, staff availability reinforced.   "

## 2024-07-08 NOTE — NURSING NOTE
Pt calm and cooperative. Appears flat and depressed. Mostly isolative to room. Out for needs and some groups. On assessment Pt is hopeless and helpless. Endorses severe depression which she describes as her baseline. Denies current SI, and verbally agrees to remain safe on the unit. Describes feeling lonely and isolated at home. Triggered in group by the video that they watched, but unable to elaborate more. Slow to respond and crying throughout assessment; w/ no observed tears. Staff suggests several coping skills for depression/anxiety, and ideas for meeting new people. Pt not receptive, and finds a problem for every solution. Pt describes racing negative thoughts. Denies all other psych S+S's. Staff support and encouragement provided. Q7's maintained. Will cont to provide support as needed.

## 2024-07-09 PROCEDURE — 99232 SBSQ HOSP IP/OBS MODERATE 35: CPT | Performed by: PSYCHIATRY & NEUROLOGY

## 2024-07-09 RX ADMIN — TOPIRAMATE 150 MG: 25 TABLET, FILM COATED ORAL at 09:09

## 2024-07-09 RX ADMIN — SPIRONOLACTONE 25 MG: 25 TABLET ORAL at 09:09

## 2024-07-09 RX ADMIN — ACETAZOLAMIDE 500 MG: 250 TABLET ORAL at 17:07

## 2024-07-09 RX ADMIN — CYCLOSPORINE 1 DROP: 0 SOLUTION/ DROPS OPHTHALMIC; TOPICAL at 21:27

## 2024-07-09 RX ADMIN — GABAPENTIN 300 MG: 300 CAPSULE ORAL at 09:08

## 2024-07-09 RX ADMIN — PROCHLORPERAZINE MALEATE 10 MG: 5 TABLET ORAL at 11:32

## 2024-07-09 RX ADMIN — LINACLOTIDE 1 CAPSULE: 290 CAPSULE, GELATIN COATED ORAL at 09:14

## 2024-07-09 RX ADMIN — CYCLOSPORINE 1 DROP: 0 SOLUTION/ DROPS OPHTHALMIC; TOPICAL at 09:14

## 2024-07-09 RX ADMIN — PANTOPRAZOLE SODIUM 40 MG: 40 TABLET, DELAYED RELEASE ORAL at 17:07

## 2024-07-09 RX ADMIN — MIRABEGRON 25 MG: 25 TABLET, FILM COATED, EXTENDED RELEASE ORAL at 09:14

## 2024-07-09 RX ADMIN — PROPRANOLOL HYDROCHLORIDE 10 MG: 10 TABLET ORAL at 09:09

## 2024-07-09 RX ADMIN — CELECOXIB 200 MG: 100 CAPSULE ORAL at 09:08

## 2024-07-09 RX ADMIN — IPRATROPIUM BROMIDE 2 SPRAY: 42 SPRAY, METERED NASAL at 09:16

## 2024-07-09 RX ADMIN — PANTOPRAZOLE SODIUM 40 MG: 40 TABLET, DELAYED RELEASE ORAL at 06:27

## 2024-07-09 RX ADMIN — ACETAZOLAMIDE 500 MG: 250 TABLET ORAL at 09:09

## 2024-07-09 RX ADMIN — LEVOTHYROXINE SODIUM 112 MCG: 112 TABLET ORAL at 06:27

## 2024-07-09 RX ADMIN — B-COMPLEX W/ C & FOLIC ACID TAB 1 TABLET: TAB at 09:09

## 2024-07-09 RX ADMIN — GABAPENTIN 900 MG: 300 CAPSULE ORAL at 21:24

## 2024-07-09 RX ADMIN — LORATADINE 10 MG: 10 TABLET ORAL at 09:09

## 2024-07-09 RX ADMIN — ATOGEPANT 60 MG: 60 TABLET ORAL at 09:15

## 2024-07-09 RX ADMIN — CARIPRAZINE 6 MG: 3 CAPSULE, GELATIN COATED ORAL at 09:09

## 2024-07-09 RX ADMIN — DULOXETINE HYDROCHLORIDE 20 MG: 20 CAPSULE, DELAYED RELEASE ORAL at 09:09

## 2024-07-09 RX ADMIN — METFORMIN HYDROCHLORIDE 500 MG: 500 TABLET ORAL at 09:09

## 2024-07-09 RX ADMIN — FOLIC ACID 1 MG: 1 TABLET ORAL at 09:09

## 2024-07-09 RX ADMIN — ALMOTRIPTAN 12.5 MG: 12.5 TABLET, FILM COATED ORAL at 11:33

## 2024-07-09 RX ADMIN — TOPIRAMATE 150 MG: 25 TABLET, FILM COATED ORAL at 17:07

## 2024-07-09 RX ADMIN — IPRATROPIUM BROMIDE 2 SPRAY: 42 SPRAY, METERED NASAL at 21:27

## 2024-07-09 RX ADMIN — Medication 5000 UNITS: at 09:09

## 2024-07-09 RX ADMIN — CELECOXIB 200 MG: 100 CAPSULE ORAL at 17:07

## 2024-07-09 NOTE — PLAN OF CARE
Problem: Ineffective Coping  Goal: Identifies ineffective coping skills  Outcome: Progressing  Goal: Identifies healthy coping skills  Outcome: Progressing  Goal: Participates in unit activities  Description: Interventions:  - Provide therapeutic environment   - Provide required programming   - Redirect inappropriate behaviors   Outcome: Progressing     Problem: Depression  Goal: Verbalize thoughts and feelings  Description: Interventions:  - Assess and re-assess patient's level of risk   - Engage patient in 1:1 interactions, daily, for a minimum of 15 minutes   - Encourage patient to express feelings, fears, frustrations, hopes   Outcome: Progressing  Goal: Refrain from isolation  Description: Interventions:  - Develop a trusting relationship   - Encourage socialization   Outcome: Progressing

## 2024-07-09 NOTE — PROGRESS NOTES
Progress Note - Behavioral Health   iGta Rendon 38 y.o. female MRN: [id number]  Unit/Bed#: [facility] 202-02 Encounter: [id number]    Assessment:  Principal Problem:    Severe episode of recurrent major depressive disorder, without psychotic features (Formerly [facility])  Active Problems:    Obsessive-compulsive disorder    Hypertension    Hypothyroidism    Overactive bladder    Binge-eating disorder, severe    Gambling disorder, moderate    Esophageal reflux    EILEEN (generalized anxiety disorder)    Lumbar degenerative disc disease    Morbid obesity (HCC)    PTSD (post-traumatic stress disorder)    Urgency incontinence    Vitamin D deficiency    Iliotibial band syndrome of left side    Piriformis syndrome of left side    Skin lesion    Prediabetes    Family history of cancer    Chronic migraine without aura without status migrainosus, not intractable    CAIO (obstructive sleep apnea)    Cervicalgia    Numbness and tingling in both hands    Dermatitis    Chronic constipation    Thyroid disease neuropathy (HCC)    Migraine headache    Medical clearance for psychiatric admission      Plan:  --Continue with psychiatric hospitalization  --Continue with individual, group, and milieu therapy  --Continue the following medications:  Current Facility-Administered Medications   Medication Dose Route Frequency    acetaminophen (TYLENOL) tablet 650 mg  650 mg Oral Q4H PRN    acetaminophen (TYLENOL) tablet 975 mg  975 mg Oral Q6H PRN    acetaZOLAMIDE (DIAMOX) tablet 500 mg  500 mg Oral BID    almotriptan (AXERT) tablet 12.5 mg  12.5 mg Oral Once PRN    aluminum-magnesium hydroxide-simethicone (MAALOX) oral suspension 30 mL  30 mL Oral Q4H PRN    Atogepant TABS 60 mg  60 mg Oral Daily    benztropine (COGENTIN) injection 1 mg  1 mg Intramuscular Q4H PRN Max 6/day    bisacodyl (DULCOLAX) rectal suppository 10 mg  10 mg Rectal Daily PRN    cariprazine (VRAYLAR) capsule 6 mg  6 mg Oral Daily    celecoxib (CeleBREX) capsule 200 mg  200 mg Oral BID     Cholecalciferol (VITAMIN D3) tablet 5,000 Units  5,000 Units Oral Daily    cycloSPORINE (PF) 0.09 % SOLN 1 drop  1 drop Ophthalmic BID    hydrOXYzine HCL (ATARAX) tablet 50 mg  50 mg Oral Q6H PRN Max 4/day    Or    diphenhydrAMINE (BENADRYL) injection 50 mg  50 mg Intramuscular Q6H PRN    DULoxetine (CYMBALTA) delayed release capsule 20 mg  20 mg Oral Daily    folic acid (FOLVITE) tablet 1 mg  1 mg Oral Daily    gabapentin (NEURONTIN) capsule 300 mg  300 mg Oral After Breakfast    gabapentin (NEURONTIN) capsule 900 mg  900 mg Oral HS    hydrocortisone (ANUSOL-HC) 2.5 % rectal cream   Topical BID    hydrOXYzine HCL (ATARAX) tablet 100 mg  100 mg Oral Q6H PRN Max 4/day    Or    LORazepam (ATIVAN) injection 2 mg  2 mg Intramuscular Q6H PRN    hydrOXYzine HCL (ATARAX) tablet 25 mg  25 mg Oral Q6H PRN Max 4/day    ipratropium (ATROVENT) 0.06 % nasal spray 2 spray  2 spray Each Nare Q12H YFN    levothyroxine tablet 112 mcg  112 mcg Oral Early Morning    linaCLOtide CAPS 1 capsule  290 mcg Oral Daily    loratadine (CLARITIN) tablet 10 mg  10 mg Oral Daily    melatonin tablet 3 mg  3 mg Oral HS PRN    metFORMIN (GLUCOPHAGE) tablet 500 mg  500 mg Oral Daily With Breakfast    Mirabegron ER TB24 25 mg  25 mg Oral Daily    multivitamin stress formula tablet 1 tablet  1 tablet Oral Daily    OLANZapine (ZyPREXA) tablet 5 mg  5 mg Oral Q4H PRN Max 3/day    Or    OLANZapine (ZyPREXA) IM injection 2.5 mg  2.5 mg Intramuscular Q4H PRN Max 3/day    OLANZapine (ZyPREXA) tablet 5 mg  5 mg Oral Q3H PRN Max 3/day    Or    OLANZapine (ZyPREXA) IM injection 5 mg  5 mg Intramuscular Q3H PRN Max 3/day    OLANZapine (ZyPREXA) tablet 2.5 mg  2.5 mg Oral Q4H PRN Max 6/day    pantoprazole (PROTONIX) EC tablet 40 mg  40 mg Oral BID AC    polyethylene glycol (MIRALAX) packet 17 g  17 g Oral Daily PRN    prochlorperazine (COMPAZINE) tablet 10 mg  10 mg Oral BID PRN    propranolol (INDERAL) tablet 10 mg  10 mg Oral Daily    [START ON 7/11/2024]  Semaglutide-Weight Management (WEGOVY) subcutaneous 1.7 mg  1.7 mg Subcutaneous Weekly    senna-docusate sodium (SENOKOT S) 8.6-50 mg per tablet 1 tablet  1 tablet Oral Daily PRN    spironolactone (ALDACTONE) tablet 25 mg  25 mg Oral Daily    topiramate (TOPAMAX) tablet 150 mg  150 mg Oral BID       Subjective: Patient was seen for continuation of care. Chart was reviewed and discussed with treatment team.     Over the past 24 hours, the patient continued to endorse feelings of depression and hopelessness.  There were no acute behavioral concerns.    Today, she reports ongoing depression and anxiety.  Her depression is rated as 6 out of 10.  She denies SI, HI, or AVH.  She articulates a desire for long-term residential placement and states that she feels better when she is hospitalized.  She is tolerating newly started Cymbalta well without any side effects and we discussed plan to increase the dose for tomorrow.    Patient denied adverse effects to their psychiatric medication regimen. Patient denied other new or worsening psychiatric symptoms/complaints at this time. Discussed the importance of continuing to take medications as prescribed, as well as the importance of continuing to attend groups on the unit.     Psychiatric Review of Systems:  Medication adverse effects: none  Sleep: unchanged  Appetite: unchanged  Behaviors over the past 24 hours: unchanged    Vitals:  Vitals:    07/08/24 1540   BP: 118/74   Pulse: 60   Resp: 18   Temp: 97.9 °F (36.6 °C)   SpO2: 99%       Laboratory results:    I have personally reviewed all pertinent laboratory/tests results  No results found for this or any previous visit (from the past 48 hour(s)).     Current Medications:  Current medications as per above. All medications have been reviewed.   Risks, benefits, alternatives, and possible side effects of patient's psychiatric medications were discussed with patient.     Mental Status Evaluation:  Appearance: casually dressed,  "consistent with stated age  Motor: +psychomotor retardation, no gait abnormalities  Behavior: cooperative, answers questions appropriately  Speech: soft, normal rhythm  Mood: \"still depressed and anxious\"  Affect: constricted, depressed-appearing  Thought Process: linear and goal-oriented  Thought Content: denies auditory hallucinations, denies visual hallucinations, denies delusions  Risk Potential: denies suicidal ideation, plan, or intent. Denies homicidal ideation  Sensorium: Oriented to person, place, time, and situation  Cognition: cognitive ability appears intact but was not quantitatively tested  Consciousness: alert and awake  Attention: intact, able to focus without difficulty  Insight: fair  Judgement: limited      Progress Toward Goals & Illness Status: Patient is not at goal. They are not yet ready for discharge. The patient's condition currently requires active psychopharmacological medication management, interdisciplinary coordination with case management, and the utilization of adjunctive milieu and group therapy to augment psychopharmacological efficacy. The patient's risk of morbidity, and progression or decompensation of psychiatric disease, is higher without this current treatment.       This note has been constructed using a voice recognition system. There may be translation, syntax, or grammatical errors. If you have any questions, please contact the dictating provider.    "

## 2024-07-09 NOTE — CASE MANAGEMENT
"CM met with pt to check in. Pt verbalized stress about her current Bankruptcy case and that there is paperwork sent to her aunt's home and then she has to do a Zoom call with a panel of judges for the Bankrupt[employer]. Pt reported \"I know I can't stay in hospital forever but I am worried I will 'relapse' and I don't want to feel like it did.\" Pt reported that she and ICM have been looking into different  housing for the future.     CM spoke to pt about Virtual PHP with [employer]. Pt reported being interested so she can at least have something to do and a schedule. CM will check to see if they accept her [place] insurance. CM provided pt with print out on the program for her to have. Pt verbalized understanding.   "

## 2024-07-09 NOTE — CASE MANAGEMENT
"CM sent in basket message to [employer] to see if pt's insurance is accepted for Virtual PHP.     3:15pm   CM received message back and they reported  \"We are not in network with that [facility] but I guess Lizett can obtain [facility] so that we can schedule her for virtual PHP. Let me know when you have the confirmed d/c date.\"  "

## 2024-07-10 PROCEDURE — 99232 SBSQ HOSP IP/OBS MODERATE 35: CPT | Performed by: PSYCHIATRY & NEUROLOGY

## 2024-07-10 RX ORDER — DULOXETIN HYDROCHLORIDE 30 MG/1
30 CAPSULE, DELAYED RELEASE ORAL DAILY
Status: DISCONTINUED | OUTPATIENT
Start: 2024-07-10 | End: 2024-07-11

## 2024-07-10 RX ADMIN — IPRATROPIUM BROMIDE 2 SPRAY: 42 SPRAY, METERED NASAL at 09:22

## 2024-07-10 RX ADMIN — CARIPRAZINE 6 MG: 3 CAPSULE, GELATIN COATED ORAL at 09:24

## 2024-07-10 RX ADMIN — PROPRANOLOL HYDROCHLORIDE 10 MG: 10 TABLET ORAL at 09:25

## 2024-07-10 RX ADMIN — GABAPENTIN 900 MG: 300 CAPSULE ORAL at 22:01

## 2024-07-10 RX ADMIN — Medication 5000 UNITS: at 09:25

## 2024-07-10 RX ADMIN — ATOGEPANT 60 MG: 60 TABLET ORAL at 09:23

## 2024-07-10 RX ADMIN — LINACLOTIDE 1 CAPSULE: 290 CAPSULE, GELATIN COATED ORAL at 09:23

## 2024-07-10 RX ADMIN — ACETAZOLAMIDE 500 MG: 250 TABLET ORAL at 18:27

## 2024-07-10 RX ADMIN — B-COMPLEX W/ C & FOLIC ACID TAB 1 TABLET: TAB at 09:25

## 2024-07-10 RX ADMIN — ACETAZOLAMIDE 500 MG: 250 TABLET ORAL at 09:24

## 2024-07-10 RX ADMIN — CELECOXIB 200 MG: 100 CAPSULE ORAL at 18:27

## 2024-07-10 RX ADMIN — TOPIRAMATE 150 MG: 25 TABLET, FILM COATED ORAL at 09:24

## 2024-07-10 RX ADMIN — LORATADINE 10 MG: 10 TABLET ORAL at 09:24

## 2024-07-10 RX ADMIN — FOLIC ACID 1 MG: 1 TABLET ORAL at 09:24

## 2024-07-10 RX ADMIN — PANTOPRAZOLE SODIUM 40 MG: 40 TABLET, DELAYED RELEASE ORAL at 16:40

## 2024-07-10 RX ADMIN — SPIRONOLACTONE 25 MG: 25 TABLET ORAL at 09:24

## 2024-07-10 RX ADMIN — CELECOXIB 200 MG: 100 CAPSULE ORAL at 09:24

## 2024-07-10 RX ADMIN — METFORMIN HYDROCHLORIDE 500 MG: 500 TABLET ORAL at 09:24

## 2024-07-10 RX ADMIN — CYCLOSPORINE 1 DROP: 0 SOLUTION/ DROPS OPHTHALMIC; TOPICAL at 22:01

## 2024-07-10 RX ADMIN — MIRABEGRON 25 MG: 25 TABLET, FILM COATED, EXTENDED RELEASE ORAL at 09:23

## 2024-07-10 RX ADMIN — LEVOTHYROXINE SODIUM 112 MCG: 112 TABLET ORAL at 05:55

## 2024-07-10 RX ADMIN — DULOXETINE HYDROCHLORIDE 30 MG: 30 CAPSULE, DELAYED RELEASE ORAL at 09:24

## 2024-07-10 RX ADMIN — CYCLOSPORINE 1 DROP: 0 SOLUTION/ DROPS OPHTHALMIC; TOPICAL at 09:23

## 2024-07-10 RX ADMIN — TOPIRAMATE 150 MG: 25 TABLET, FILM COATED ORAL at 18:27

## 2024-07-10 RX ADMIN — IPRATROPIUM BROMIDE 2 SPRAY: 42 SPRAY, METERED NASAL at 22:01

## 2024-07-10 RX ADMIN — PANTOPRAZOLE SODIUM 40 MG: 40 TABLET, DELAYED RELEASE ORAL at 05:55

## 2024-07-10 RX ADMIN — GABAPENTIN 300 MG: 300 CAPSULE ORAL at 09:24

## 2024-07-10 RX ADMIN — SEMAGLUTIDE 1.7 MG: 1.7 INJECTION, SOLUTION SUBCUTANEOUS at 12:07

## 2024-07-10 NOTE — NURSING NOTE
Patient interview in room, calm and cooperative. Patient states she's had increased anxitey related to unit noise. This writer educated and encouraged patient to request PRN medication for anxiety. Ear plugs also supplied to patient. States she also states she feels no improvement in depression. Currently denies SI/HI/AVH. Patient encouraged to seek staff with any needs or concerns.

## 2024-07-10 NOTE — NURSING NOTE
Pt resting in bed on approach, appears depressed, however denies feeling depressed. She denies SI, HI and hallucinations. Pt reports feeling tired and states this is why she has been withdrawn to her room this evening. Pt counseled and encouraged to come to staff with any needs, verbalized understanding.

## 2024-07-10 NOTE — PROGRESS NOTES
Progress Note - Behavioral Health   Gita Rendon 38 y.o. female MRN: [id number]  Unit/Bed#: [facility] 202-02 Encounter: [id number]    Assessment:  Principal Problem:    Severe episode of recurrent major depressive disorder, without psychotic features ([facility])  Active Problems:    Obsessive-compulsive disorder    Hypertension    Hypothyroidism    Overactive bladder    Binge-eating disorder, severe    Gambling disorder, moderate    Esophageal reflux    EILEEN (generalized anxiety disorder)    Lumbar degenerative disc disease    Morbid obesity (HCC)    PTSD (post-traumatic stress disorder)    Urgency incontinence    Vitamin D deficiency    Iliotibial band syndrome of left side    Piriformis syndrome of left side    Skin lesion    Prediabetes    Family history of cancer    Chronic migraine without aura without status migrainosus, not intractable    CAIO (obstructive sleep apnea)    Cervicalgia    Numbness and tingling in both hands    Dermatitis    Chronic constipation    Thyroid disease neuropathy (HCC)    Migraine headache    Medical clearance for psychiatric admission      Plan:  --Increase Cymbalta to 30mg PO QD  --Possible DC next week pending patients response over the next several days  --Continue with psychiatric hospitalization  --Continue with individual, group, and milieu therapy  --Continue the following medications:  Current Facility-Administered Medications   Medication Dose Route Frequency    acetaminophen (TYLENOL) tablet 650 mg  650 mg Oral Q4H PRN    acetaminophen (TYLENOL) tablet 975 mg  975 mg Oral Q6H PRN    acetaZOLAMIDE (DIAMOX) tablet 500 mg  500 mg Oral BID    almotriptan (AXERT) tablet 12.5 mg  12.5 mg Oral Once PRN    aluminum-magnesium hydroxide-simethicone (MAALOX) oral suspension 30 mL  30 mL Oral Q4H PRN    Atogepant TABS 60 mg  60 mg Oral Daily    benztropine (COGENTIN) injection 1 mg  1 mg Intramuscular Q4H PRN Max 6/day    bisacodyl (DULCOLAX) rectal suppository 10 mg  10 mg Rectal Daily PRN     cariprazine (VRAYLAR) capsule 6 mg  6 mg Oral Daily    celecoxib (CeleBREX) capsule 200 mg  200 mg Oral BID    Cholecalciferol (VITAMIN D3) tablet 5,000 Units  5,000 Units Oral Daily    cycloSPORINE (PF) 0.09 % SOLN 1 drop  1 drop Ophthalmic BID    hydrOXYzine HCL (ATARAX) tablet 50 mg  50 mg Oral Q6H PRN Max 4/day    Or    diphenhydrAMINE (BENADRYL) injection 50 mg  50 mg Intramuscular Q6H PRN    DULoxetine (CYMBALTA) delayed release capsule 30 mg  30 mg Oral Daily    folic acid (FOLVITE) tablet 1 mg  1 mg Oral Daily    gabapentin (NEURONTIN) capsule 300 mg  300 mg Oral After Breakfast    gabapentin (NEURONTIN) capsule 900 mg  900 mg Oral HS    hydrocortisone (ANUSOL-HC) 2.5 % rectal cream   Topical BID    hydrOXYzine HCL (ATARAX) tablet 100 mg  100 mg Oral Q6H PRN Max 4/day    Or    LORazepam (ATIVAN) injection 2 mg  2 mg Intramuscular Q6H PRN    hydrOXYzine HCL (ATARAX) tablet 25 mg  25 mg Oral Q6H PRN Max 4/day    ipratropium (ATROVENT) 0.06 % nasal spray 2 spray  2 spray Each Nare Q12H YFN    levothyroxine tablet 112 mcg  112 mcg Oral Early Morning    linaCLOtide CAPS 1 capsule  290 mcg Oral Daily    loratadine (CLARITIN) tablet 10 mg  10 mg Oral Daily    melatonin tablet 3 mg  3 mg Oral HS PRN    metFORMIN (GLUCOPHAGE) tablet 500 mg  500 mg Oral Daily With Breakfast    Mirabegron ER TB24 25 mg  25 mg Oral Daily    multivitamin stress formula tablet 1 tablet  1 tablet Oral Daily    OLANZapine (ZyPREXA) tablet 5 mg  5 mg Oral Q4H PRN Max 3/day    Or    OLANZapine (ZyPREXA) IM injection 2.5 mg  2.5 mg Intramuscular Q4H PRN Max 3/day    OLANZapine (ZyPREXA) tablet 5 mg  5 mg Oral Q3H PRN Max 3/day    Or    OLANZapine (ZyPREXA) IM injection 5 mg  5 mg Intramuscular Q3H PRN Max 3/day    OLANZapine (ZyPREXA) tablet 2.5 mg  2.5 mg Oral Q4H PRN Max 6/day    pantoprazole (PROTONIX) EC tablet 40 mg  40 mg Oral BID AC    polyethylene glycol (MIRALAX) packet 17 g  17 g Oral Daily PRN    prochlorperazine (COMPAZINE) tablet  10 mg  10 mg Oral BID PRN    propranolol (INDERAL) tablet 10 mg  10 mg Oral Daily    [START ON 7/11/2024] Semaglutide-Weight Management (WEGOVY) subcutaneous 1.7 mg  1.7 mg Subcutaneous Weekly    senna-docusate sodium (SENOKOT S) 8.6-50 mg per tablet 1 tablet  1 tablet Oral Daily PRN    spironolactone (ALDACTONE) tablet 25 mg  25 mg Oral Daily    topiramate (TOPAMAX) tablet 150 mg  150 mg Oral BID       Subjective: Patient was seen for continuation of care. Chart was reviewed and discussed with treatment team.     Over the past 24 hours, patient reported HA and some increased anxiety but was otherwise doing well.    Today, the patient reports depression is improving.  She rates it today as 4 out of 10 in intensity, down from 7 out of 10 yesterday.  She denies suicidal ideation, intent, or plan.  She states that she is feeling more hopeful and she discussed possible partial hospitalization as her discharge plan.  She is tolerating increased dose of Cymbalta without side effects.  She had a mild headache yesterday.    Patient denied adverse effects to their psychiatric medication regimen. Patient denied other new or worsening psychiatric symptoms/complaints at this time. Discussed the importance of continuing to take medications as prescribed, as well as the importance of continuing to attend groups on the unit.     Psychiatric Review of Systems:  Medication adverse effects: none  Sleep: unchanged  Appetite: unchanged  Behaviors over the past 24 hours: unchanged    Vitals:  Vitals:    07/10/24 0734   BP: 139/86   Pulse: 92   Resp: 18   Temp: (!) 97.3 °F (36.3 °C)   SpO2: 99%       Laboratory results:    I have personally reviewed all pertinent laboratory/tests results  No results found for this or any previous visit (from the past 48 hour(s)).     Current Medications:  Current medications as per above. All medications have been reviewed.   Risks, benefits, alternatives, and possible side effects of patient's  "psychiatric medications were discussed with patient.     Mental Status Evaluation:  Appearance: casually dressed, consistent with stated age  Motor: +psychomotor retardation, no gait abnormalities  Behavior: cooperative, answers questions appropriately  Speech: soft, normal rhythm  Mood: \"a little more hopeful\"  Affect: constricted  Thought Process: linear and goal-oriented  Thought Content: denies auditory hallucinations, denies visual hallucinations, denies delusions  Risk Potential: denies suicidal ideation, plan, or intent. Denies homicidal ideation  Sensorium: Oriented to person, place, time, and situation  Cognition: cognitive ability appears intact but was not quantitatively tested  Consciousness: alert and awake  Attention: intact, able to focus without difficulty  Insight: fair  Judgement: fair      Progress Toward Goals & Illness Status: Patient is not at goal. They are not yet ready for discharge. The patient's condition currently requires active psychopharmacological medication management, interdisciplinary coordination with case management, and the utilization of adjunctive milieu and group therapy to augment psychopharmacological efficacy. The patient's risk of morbidity, and progression or decompensation of psychiatric disease, is higher without this current treatment.       This note has been constructed using a voice recognition system. There may be translation, syntax, or grammatical errors. If you have any questions, please contact the dictating provider.    "

## 2024-07-10 NOTE — PLAN OF CARE
Problem: Ineffective Coping  Goal: Identifies ineffective coping skills  Outcome: Progressing  Goal: Identifies healthy coping skills  Outcome: Progressing     Problem: Depression  Goal: Treatment Goal: Demonstrate behavioral control of depressive symptoms, verbalize feelings of improved mood/affect, and adopt new coping skills prior to discharge  Outcome: Progressing  Goal: Verbalize thoughts and feelings  Description: Interventions:  - Assess and re-assess patient's level of risk   - Engage patient in 1:1 interactions, daily, for a minimum of 15 minutes   - Encourage patient to express feelings, fears, frustrations, hopes   Outcome: Progressing  Goal: Complete daily ADLs, including personal hygiene independently, as able  Description: Interventions:  - Observe, teach, and assist patient with ADLS  -  Monitor and promote a balance of rest/activity, with adequate nutrition and elimination   Outcome: Progressing     Problem: Nutrition/Hydration-ADULT  Goal: Nutrient/Hydration intake appropriate for improving, restoring or maintaining nutritional needs  Description: Monitor and assess patient's nutrition/hydration status for malnutrition. Collaborate with interdisciplinary team and initiate plan and interventions as ordered.  Monitor patient's weight and dietary intake as ordered or per policy. Utilize nutrition screening tool and intervene as necessary. Determine patient's food preferences and provide high-protein, high-caloric foods as appropriate.     INTERVENTIONS:  - Monitor oral intake, urinary output, labs, and treatment plans  - Assess nutrition and hydration status and recommend course of action  - Evaluate amount of meals eaten  - Assist patient with eating if necessary   - Allow adequate time for meals  - Recommend/ encourage appropriate diets, oral nutritional supplements, and vitamin/mineral supplements  - Order, calculate, and assess calorie counts as needed  - Recommend, monitor, and adjust tube  feedings and TPN/PPN based on assessed needs  - Assess need for intravenous fluids  - Provide specific nutrition/hydration education as appropriate  - Include patient/family/caregiver in decisions related to nutrition  Outcome: Progressing     Problem: Ineffective Coping  Goal: Participates in unit activities  Description: Interventions:  - Provide therapeutic environment   - Provide required programming   - Redirect inappropriate behaviors   Outcome: Not Progressing     Problem: Depression  Goal: Refrain from isolation  Description: Interventions:  - Develop a trusting relationship   - Encourage socialization   Outcome: Not Progressing  Goal: Attend and participate in unit activities, including therapeutic, recreational, and educational groups  Description: Interventions:  - Provide therapeutic and educational activities daily, encourage attendance and participation, and document same in the medical record   Outcome: Not Progressing     Problem: Anxiety  Goal: Anxiety is at manageable level  Description: Interventions:  - Assess and monitor patient's anxiety level.   - Monitor for signs and symptoms (heart palpitations, chest pain, shortness of breath, headaches, nausea, feeling jumpy, restlessness, irritable, apprehensive).   - Collaborate with interdisciplinary team and initiate plan and interventions as ordered.  - [place] patient to unit/surroundings  - Explain treatment plan  - Encourage participation in care  - Encourage verbalization of concerns/fears  - Identify coping mechanisms  - Assist in developing anxiety-reducing skills  - Administer/offer alternative therapies  - Limit or eliminate stimulants  Outcome: Not Progressing     Problem: DEPRESSION  Goal: Will be euthymic at discharge  Description: INTERVENTIONS:  - Administer medication as ordered  - Provide emotional support via 1:1 interaction with staff  - Encourage involvement in milieu/groups/activities  - Monitor for social isolation  Outcome: Not  Progressing

## 2024-07-10 NOTE — PLAN OF CARE
Patient regularly attends groups and other unit activities.     Problem: Ineffective Coping  Goal: Participates in unit activities  Description: Interventions:  - Provide therapeutic environment   - Provide required programming   - Redirect inappropriate behaviors   Outcome: Progressing     Problem: DEPRESSION  Goal: Will be euthymic at discharge  Description: INTERVENTIONS:  - Administer medication as ordered  - Provide emotional support via 1:1 interaction with staff  - Encourage involvement in milieu/groups/activities  - Monitor for social isolation  Outcome: Progressing

## 2024-07-11 PROCEDURE — 99232 SBSQ HOSP IP/OBS MODERATE 35: CPT | Performed by: PSYCHIATRY & NEUROLOGY

## 2024-07-11 RX ORDER — DULOXETIN HYDROCHLORIDE 20 MG/1
40 CAPSULE, DELAYED RELEASE ORAL DAILY
Status: DISCONTINUED | OUTPATIENT
Start: 2024-07-11 | End: 2024-07-12 | Stop reason: HOSPADM

## 2024-07-11 RX ADMIN — CYCLOSPORINE 1 DROP: 0 SOLUTION/ DROPS OPHTHALMIC; TOPICAL at 09:16

## 2024-07-11 RX ADMIN — ACETAZOLAMIDE 500 MG: 250 TABLET ORAL at 18:15

## 2024-07-11 RX ADMIN — PANTOPRAZOLE SODIUM 40 MG: 40 TABLET, DELAYED RELEASE ORAL at 17:00

## 2024-07-11 RX ADMIN — GABAPENTIN 900 MG: 300 CAPSULE ORAL at 21:35

## 2024-07-11 RX ADMIN — SPIRONOLACTONE 25 MG: 25 TABLET ORAL at 09:17

## 2024-07-11 RX ADMIN — ATOGEPANT 60 MG: 60 TABLET ORAL at 09:15

## 2024-07-11 RX ADMIN — PROPRANOLOL HYDROCHLORIDE 10 MG: 10 TABLET ORAL at 09:16

## 2024-07-11 RX ADMIN — CELECOXIB 200 MG: 100 CAPSULE ORAL at 09:17

## 2024-07-11 RX ADMIN — MIRABEGRON 25 MG: 25 TABLET, FILM COATED, EXTENDED RELEASE ORAL at 09:16

## 2024-07-11 RX ADMIN — ACETAZOLAMIDE 500 MG: 250 TABLET ORAL at 09:17

## 2024-07-11 RX ADMIN — METFORMIN HYDROCHLORIDE 500 MG: 500 TABLET ORAL at 09:17

## 2024-07-11 RX ADMIN — GABAPENTIN 300 MG: 300 CAPSULE ORAL at 09:17

## 2024-07-11 RX ADMIN — TOPIRAMATE 150 MG: 25 TABLET, FILM COATED ORAL at 18:15

## 2024-07-11 RX ADMIN — CELECOXIB 200 MG: 100 CAPSULE ORAL at 18:15

## 2024-07-11 RX ADMIN — CARIPRAZINE 6 MG: 3 CAPSULE, GELATIN COATED ORAL at 09:18

## 2024-07-11 RX ADMIN — LEVOTHYROXINE SODIUM 112 MCG: 112 TABLET ORAL at 06:51

## 2024-07-11 RX ADMIN — DULOXETINE HYDROCHLORIDE 40 MG: 20 CAPSULE, DELAYED RELEASE ORAL at 09:18

## 2024-07-11 RX ADMIN — TOPIRAMATE 150 MG: 25 TABLET, FILM COATED ORAL at 09:17

## 2024-07-11 RX ADMIN — LORATADINE 10 MG: 10 TABLET ORAL at 09:17

## 2024-07-11 RX ADMIN — IPRATROPIUM BROMIDE 2 SPRAY: 42 SPRAY, METERED NASAL at 09:16

## 2024-07-11 RX ADMIN — Medication 5000 UNITS: at 09:17

## 2024-07-11 RX ADMIN — B-COMPLEX W/ C & FOLIC ACID TAB 1 TABLET: TAB at 09:17

## 2024-07-11 RX ADMIN — PANTOPRAZOLE SODIUM 40 MG: 40 TABLET, DELAYED RELEASE ORAL at 06:50

## 2024-07-11 RX ADMIN — LINACLOTIDE 1 CAPSULE: 290 CAPSULE, GELATIN COATED ORAL at 09:15

## 2024-07-11 RX ADMIN — FOLIC ACID 1 MG: 1 TABLET ORAL at 09:17

## 2024-07-11 NOTE — DISCHARGE INSTR - APPOINTMENTS
You will be discharged to [address] [place] [id number]   You confirmed that your cell phone number is [phone]          Reina our Behavioral Health Nurse Navigator, will be calling you after your discharge, on the phone number that you provided.  She will be available as an additional support, if needed.   If you wish to speak with Reina, you may contact her at [phone].

## 2024-07-11 NOTE — CASE MANAGEMENT
"SABRINA sent in basket message to [facility] to see if pt can be scheduled for virtual PHP.     They responded and reported:   \"Elizabeth has been scheduled to start VIRTUAL PHP on 7/16 Tuesday - intakes 8.30 psychiatrist, 9.30  and she will start the group on the same day after the intakes.   I will e-mail her the paperwork and TEAMS appointment links on Monday afternoon.   I see in her chart that she has [employer] Medicare insurance as well? Can you double check if it's still active or she has only [facility]?\"     CM informed them that pt still has Medicare.     CM called [facility] ([phone]) to inform them of pt hospitalization (pt sees [facility] for Therapy and Dr. Weston for Medication Management) and to inform them that pt will begin PHP and pt will reach out to them to schedule once pt is close to completing PHP. CM left detailed VM for them with this information and call back number if they have any questions.     SABRINA received call back from Roberta at [facility] and she reported that pt has Therapy appt on 7/23 and 7/30, she will cancel 7/23 and then have pt call if she is still in PHP for the 7/30 appt to let them know.     SABRINA completed Prior Auth for Cymbalta (40mg) on covermymeds   Sent to both [facility] and [employer] for review   "

## 2024-07-11 NOTE — PROGRESS NOTES
Progress Note - Behavioral Health   Gita Rendon 38 y.o. female MRN: [id number]  Unit/Bed#: [facility] 202-02 Encounter: [id number]    Assessment:  Principal Problem:    Severe episode of recurrent major depressive disorder, without psychotic features ([facility])  Active Problems:    Obsessive-compulsive disorder    Hypertension    Hypothyroidism    Overactive bladder    Binge-eating disorder, severe    Gambling disorder, moderate    Esophageal reflux    EILEEN (generalized anxiety disorder)    Lumbar degenerative disc disease    Morbid obesity (HCC)    PTSD (post-traumatic stress disorder)    Urgency incontinence    Vitamin D deficiency    Iliotibial band syndrome of left side    Piriformis syndrome of left side    Skin lesion    Prediabetes    Family history of cancer    Chronic migraine without aura without status migrainosus, not intractable    CAIO (obstructive sleep apnea)    Cervicalgia    Numbness and tingling in both hands    Dermatitis    Chronic constipation    Thyroid disease neuropathy (HCC)    Migraine headache    Medical clearance for psychiatric admission      Plan:  --Increase Cymbalta to 40 mg PO QD  -- Tentative discharge plan for Monday  --Continue with psychiatric hospitalization  --Continue with individual, group, and milieu therapy  --Continue the following medications:  Current Facility-Administered Medications   Medication Dose Route Frequency    acetaminophen (TYLENOL) tablet 650 mg  650 mg Oral Q4H PRN    acetaminophen (TYLENOL) tablet 975 mg  975 mg Oral Q6H PRN    acetaZOLAMIDE (DIAMOX) tablet 500 mg  500 mg Oral BID    almotriptan (AXERT) tablet 12.5 mg  12.5 mg Oral Once PRN    aluminum-magnesium hydroxide-simethicone (MAALOX) oral suspension 30 mL  30 mL Oral Q4H PRN    Atogepant TABS 60 mg  60 mg Oral Daily    benztropine (COGENTIN) injection 1 mg  1 mg Intramuscular Q4H PRN Max 6/day    bisacodyl (DULCOLAX) rectal suppository 10 mg  10 mg Rectal Daily PRN    cariprazine (VRAYLAR) capsule 6 mg  6  mg Oral Daily    celecoxib (CeleBREX) capsule 200 mg  200 mg Oral BID    Cholecalciferol (VITAMIN D3) tablet 5,000 Units  5,000 Units Oral Daily    cycloSPORINE (PF) 0.09 % SOLN 1 drop  1 drop Ophthalmic BID    hydrOXYzine HCL (ATARAX) tablet 50 mg  50 mg Oral Q6H PRN Max 4/day    Or    diphenhydrAMINE (BENADRYL) injection 50 mg  50 mg Intramuscular Q6H PRN    DULoxetine (CYMBALTA) delayed release capsule 40 mg  40 mg Oral Daily    folic acid (FOLVITE) tablet 1 mg  1 mg Oral Daily    gabapentin (NEURONTIN) capsule 300 mg  300 mg Oral After Breakfast    gabapentin (NEURONTIN) capsule 900 mg  900 mg Oral HS    hydrocortisone (ANUSOL-HC) 2.5 % rectal cream   Topical BID    hydrOXYzine HCL (ATARAX) tablet 100 mg  100 mg Oral Q6H PRN Max 4/day    Or    LORazepam (ATIVAN) injection 2 mg  2 mg Intramuscular Q6H PRN    hydrOXYzine HCL (ATARAX) tablet 25 mg  25 mg Oral Q6H PRN Max 4/day    ipratropium (ATROVENT) 0.06 % nasal spray 2 spray  2 spray Each Nare Q12H YFN    levothyroxine tablet 112 mcg  112 mcg Oral Early Morning    linaCLOtide CAPS 1 capsule  290 mcg Oral Daily    loratadine (CLARITIN) tablet 10 mg  10 mg Oral Daily    melatonin tablet 3 mg  3 mg Oral HS PRN    metFORMIN (GLUCOPHAGE) tablet 500 mg  500 mg Oral Daily With Breakfast    Mirabegron ER TB24 25 mg  25 mg Oral Daily    multivitamin stress formula tablet 1 tablet  1 tablet Oral Daily    OLANZapine (ZyPREXA) tablet 5 mg  5 mg Oral Q4H PRN Max 3/day    Or    OLANZapine (ZyPREXA) IM injection 2.5 mg  2.5 mg Intramuscular Q4H PRN Max 3/day    OLANZapine (ZyPREXA) tablet 5 mg  5 mg Oral Q3H PRN Max 3/day    Or    OLANZapine (ZyPREXA) IM injection 5 mg  5 mg Intramuscular Q3H PRN Max 3/day    OLANZapine (ZyPREXA) tablet 2.5 mg  2.5 mg Oral Q4H PRN Max 6/day    pantoprazole (PROTONIX) EC tablet 40 mg  40 mg Oral BID AC    polyethylene glycol (MIRALAX) packet 17 g  17 g Oral Daily PRN    prochlorperazine (COMPAZINE) tablet 10 mg  10 mg Oral BID PRN     propranolol (INDERAL) tablet 10 mg  10 mg Oral Daily    Semaglutide-Weight Management (WEGOVY) subcutaneous 1.7 mg  1.7 mg Subcutaneous Weekly    senna-docusate sodium (SENOKOT S) 8.6-50 mg per tablet 1 tablet  1 tablet Oral Daily PRN    spironolactone (ALDACTONE) tablet 25 mg  25 mg Oral Daily    topiramate (TOPAMAX) tablet 150 mg  150 mg Oral BID       Subjective: Patient was seen for continuation of care. Chart was reviewed and discussed with treatment team.     Over the past 24 hours the patient reported feeling tired and was somewhat withdrawn to her room last evening.  However she denied SI, HI, or AVH.    Today, the patient continues to deny SI, HI, or AVH.  Her depression continues to improve and she now rates it at approximately 3 out of 10 in intensity (down from 4 out of 10 yesterday).  She states that she is feeling more hopeful and for the first time is noticing a subjective sense of excitement regarding going back home, whereas previously she would worry about going home and was crying when she would think about this.  She is tolerating the Cymbalta and we discussed increasing to 40 mg and then stopping titration.  Patient is agreeable with this plan.  We discussed a tentative discharge date for Monday and the patient is agreeable.    Patient denied adverse effects to their psychiatric medication regimen. Patient denied other new or worsening psychiatric symptoms/complaints at this time. Discussed the importance of continuing to take medications as prescribed, as well as the importance of continuing to attend groups on the unit.     Psychiatric Review of Systems:  Medication adverse effects: none  Sleep: unchanged  Appetite: unchanged  Behaviors over the past 24 hours: unchanged    Vitals:  Vitals:    07/10/24 1601   BP: 115/74   Pulse: (!) 52   Resp: 18   Temp: 98.6 °F (37 °C)   SpO2:        Laboratory results:    I have personally reviewed all pertinent laboratory/tests results  No results found for  "this or any previous visit (from the past 48 hour(s)).     Current Medications:  Current medications as per above. All medications have been reviewed.   Risks, benefits, alternatives, and possible side effects of patient's psychiatric medications were discussed with patient.     Mental Status Evaluation:  Appearance: casually dressed, appears consistent with stated age  Motor: no psychomotor disturbances, no gait abnormalities  Behavior: cooperative, interacts with this writer appropriately  Speech: normal rate, rhythm, and volume  Mood: \"better\"  Affect: euthymic, normal range and intensity  Thought Process: organized, linear, and goal-oriented  Thought Content: denies auditory hallucinations, denies visual hallucinations, denies delusions  Risk Potential: denies suicidal ideation, plan, or intent. Denies homicidal ideation  Sensorium: Oriented to person, place, time, and situation  Cognition: cognitive ability appears intact but was not quantitatively tested  Consciousness: alert and awake  Attention: able to focus without difficulty  Insight: improved  Judgement: improved          Progress Toward Goals & Illness Status: Patient is not at goal. They are not yet ready for discharge. The patient's condition currently requires active psychopharmacological medication management, interdisciplinary coordination with case management, and the utilization of adjunctive milieu and group therapy to augment psychopharmacological efficacy. The patient's risk of morbidity, and progression or decompensation of psychiatric disease, is higher without this current treatment.       This note has been constructed using a voice recognition system. There may be translation, syntax, or grammatical errors. If you have any questions, please contact the dictating provider.    "

## 2024-07-11 NOTE — CASE MANAGEMENT
CM met with pt to check in and discuss dc plans for tomorrow. Pt reported feeling improved and ready for dc. Pt reported looking forward to family reunion on Saturday that she said she goes to every year. CM reviewed pt's start for Virtual PHP on Tuesday 7/16 and information in her dc summary. Pt reported she has a laptop that she can use. CM informed her that they will email her the links on Monday and she can call if she has any issues. CM also informed pt to contact hospitals once she is close to finishing PHP to reschedule her appts. Pt verbalized understanding.     Pt reported she will need a ride home and CM will schedule LYFT. Pt signed LYFT waiver.

## 2024-07-11 NOTE — DISCHARGE INSTR - OTHER ORDERS
[facility] Crisis Support  If you are experiencing a mental health crisis, please dial 211 from your cell phone or [phone].  You can also text your zip code to .  You can also reach out to the following mental health agencies:  For Adults:  [facility]:  [phone]  [facility]:  [phone]  [facility]: [phone]  [facility]:  [phone] and [facility]: [phone]  [employer], the [employer], is the nation's largest grassroots mental health organization dedicated to building better lives for the millions of Americans affected by mental illness.    What started as a small group of families gathered around a kitchen table in 1979 has blossomed into the nation's leading voice on mental health. Today, we are part of an association of hundreds of local affiliates, state organizations and volunteers who work in your community to raise awareness and provide support and education that was not previously available to those in need.    Our INGE chapter is dedicated to provided resources and support to those who live in [facility].     [address] [place] [address]  Phone: [phone]

## 2024-07-11 NOTE — CASE MANAGEMENT
INTAKE    Readmit score:  23 Red    Confirmed Address   [address]   [place] [address]    County: [place]      Resides in the home with/can return?:    Pt lives with roommate and can return    Confirmed Phone Number: [phone]    Commitment Status/Admitted from: [address] [place] [address] ED   Presenting C/O:             ED Note    Depression       Patient reports having depression over the past few months but gotten worse within the last few days. Reports looking for inpatient treatment.      Psychiatrist:    CHOLO Sun - Dr. Trista BENNETT spoke to pt about [facility] and she is in agreement.      Therapist:    CHOLO Barrow    ACT/ICM/CPS/WRT/SC: [facility]    PCP:      Norma Traore MD  [phone]      Work/Income:      SS[employer] $1568 per month D   Legal/  Probation/[place] Ofc:    Denies   Access to Firearms:    Denies   Referrals Needed: Follow up with [facility], ICM and referral for [facility]    Transport at Discharge:    Pt will need transportation    IMM:   Elsa Text RUTH:    Emergency Contact:     Vladimir Rendon -  - [phone]    ROIs obtained:       [facility] I[facility]    Insurance:     [facility] Medicare   [facility] Medicaid    Audit:        PAWSS:  BAT:  UDS: Negative

## 2024-07-11 NOTE — PLAN OF CARE
Problem: Ineffective Coping  Goal: Identifies ineffective coping skills  Outcome: Not Progressing  Goal: Identifies healthy coping skills  Outcome: Not Progressing  Goal: Participates in unit activities  Description: Interventions:  - Provide therapeutic environment   - Provide required programming   - Redirect inappropriate behaviors   Outcome: Progressing     Problem: Depression  Goal: Treatment Goal: Demonstrate behavioral control of depressive symptoms, verbalize feelings of improved mood/affect, and adopt new coping skills prior to discharge  Outcome: Progressing  Goal: Verbalize thoughts and feelings  Description: Interventions:  - Assess and re-assess patient's level of risk   - Engage patient in 1:1 interactions, daily, for a minimum of 15 minutes   - Encourage patient to express feelings, fears, frustrations, hopes   Outcome: Not Progressing  Goal: Refrain from isolation  Description: Interventions:  - Develop a trusting relationship   - Encourage socialization   Outcome: Not Progressing  Goal: Attend and participate in unit activities, including therapeutic, recreational, and educational groups  Description: Interventions:  - Provide therapeutic and educational activities daily, encourage attendance and participation, and document same in the medical record   Outcome: Progressing  Goal: Complete daily ADLs, including personal hygiene independently, as able  Description: Interventions:  - Observe, teach, and assist patient with ADLS  -  Monitor and promote a balance of rest/activity, with adequate nutrition and elimination   Outcome: Progressing     Problem: Anxiety  Goal: Anxiety is at manageable level  Description: Interventions:  - Assess and monitor patient's anxiety level.   - Monitor for signs and symptoms (heart palpitations, chest pain, shortness of breath, headaches, nausea, feeling jumpy, restlessness, irritable, apprehensive).   - Collaborate with interdisciplinary team and initiate plan and  interventions as ordered.  - [place] patient to unit/surroundings  - Explain treatment plan  - Encourage participation in care  - Encourage verbalization of concerns/fears  - Identify coping mechanisms  - Assist in developing anxiety-reducing skills  - Administer/offer alternative therapies  - Limit or eliminate stimulants  Outcome: Progressing     Problem: DEPRESSION  Goal: Will be euthymic at discharge  Description: INTERVENTIONS:  - Administer medication as ordered  - Provide emotional support via 1:1 interaction with staff  - Encourage involvement in milieu/groups/activities  - Monitor for social isolation  Outcome: Progressing     Problem: Nutrition/Hydration-ADULT  Goal: Nutrient/Hydration intake appropriate for improving, restoring or maintaining nutritional needs  Description: Monitor and assess patient's nutrition/hydration status for malnutrition. Collaborate with interdisciplinary team and initiate plan and interventions as ordered.  Monitor patient's weight and dietary intake as ordered or per policy. Utilize nutrition screening tool and intervene as necessary. Determine patient's food preferences and provide high-protein, high-caloric foods as appropriate.     INTERVENTIONS:  - Monitor oral intake, urinary output, labs, and treatment plans  - Assess nutrition and hydration status and recommend course of action  - Evaluate amount of meals eaten  - Assist patient with eating if necessary   - Allow adequate time for meals  - Recommend/ encourage appropriate diets, oral nutritional supplements, and vitamin/mineral supplements  - Order, calculate, and assess calorie counts as needed  - Recommend, monitor, and adjust tube feedings and TPN/PPN based on assessed needs  - Assess need for intravenous fluids  - Provide specific nutrition/hydration education as appropriate  - Include patient/family/caregiver in decisions related to nutrition  Outcome: Progressing     Problem: DISCHARGE PLANNING  Goal: Discharge to  home or other facility with appropriate resources  Description: INTERVENTIONS:  - Identify barriers to discharge w/patient and caregiver  - Arrange for needed discharge resources and transportation as appropriate  - Identify discharge learning needs (meds, wound care, etc.)  - Arrange for interpretive services to assist at discharge as needed  - Refer to Case Management Department for coordinating discharge planning if the patient needs post-hospital services based on physician/advanced practitioner order or complex needs related to functional status, cognitive ability, or social support system  Outcome: Progressing

## 2024-07-11 NOTE — PROGRESS NOTES
Diagnosis of Severe episode of recurrent major depressive disorder, without psychotic features reviewed.   Short term goals for decrease in suicidal thoughts discussed.   All present parties in agreement and treatment plan signed.    07/05/24 9310   Team Meeting   Meeting Type Tx Team Meeting   Team Members Present   Team Members Present Physician;Nurse;   Physician Team Member Dr. Rocha   Nursing Team Member Graciela   Care Management Team Member Wai   Patient/Family Present   Patient Present No  (pt declined)   Patient's Family Present No

## 2024-07-11 NOTE — PLAN OF CARE
Patient regularly attends groups and other unit activities.     Problem: Ineffective Coping  Goal: Participates in unit activities  Description: Interventions:  - Provide therapeutic environment   - Provide required programming   - Redirect inappropriate behaviors   Outcome: Progressing     Problem: Depression  Goal: Refrain from isolation  Description: Interventions:  - Develop a trusting relationship   - Encourage socialization   Outcome: Progressing  Goal: Attend and participate in unit activities, including therapeutic, recreational, and educational groups  Description: Interventions:  - Provide therapeutic and educational activities daily, encourage attendance and participation, and document same in the medical record   Outcome: Progressing

## 2024-07-12 VITALS
DIASTOLIC BLOOD PRESSURE: 50 MMHG | WEIGHT: 293 LBS | TEMPERATURE: 97.7 F | RESPIRATION RATE: 16 BRPM | HEIGHT: 67 IN | HEART RATE: 57 BPM | OXYGEN SATURATION: 100 % | BODY MASS INDEX: 45.99 KG/M2 | SYSTOLIC BLOOD PRESSURE: 96 MMHG

## 2024-07-12 PROCEDURE — 99239 HOSP IP/OBS DSCHRG MGMT >30: CPT | Performed by: PSYCHIATRY & NEUROLOGY

## 2024-07-12 RX ORDER — GABAPENTIN 300 MG/1
300 CAPSULE ORAL
Start: 2024-07-12

## 2024-07-12 RX ORDER — PROPRANOLOL HYDROCHLORIDE 10 MG/1
10 TABLET ORAL DAILY
Start: 2024-07-12

## 2024-07-12 RX ORDER — DULOXETINE 40 MG/1
40 CAPSULE, DELAYED RELEASE ORAL DAILY
Qty: 30 CAPSULE | Refills: 0 | Status: SHIPPED | OUTPATIENT
Start: 2024-07-12 | End: 2024-08-11

## 2024-07-12 RX ORDER — IPRATROPIUM BROMIDE 42 UG/1
2 SPRAY, METERED NASAL EVERY 12 HOURS SCHEDULED
Start: 2024-07-12 | End: 2024-07-22

## 2024-07-12 RX ADMIN — GABAPENTIN 300 MG: 300 CAPSULE ORAL at 08:24

## 2024-07-12 RX ADMIN — MIRABEGRON 25 MG: 25 TABLET, FILM COATED, EXTENDED RELEASE ORAL at 08:25

## 2024-07-12 RX ADMIN — B-COMPLEX W/ C & FOLIC ACID TAB 1 TABLET: TAB at 08:24

## 2024-07-12 RX ADMIN — ATOGEPANT 60 MG: 60 TABLET ORAL at 08:25

## 2024-07-12 RX ADMIN — CELECOXIB 200 MG: 100 CAPSULE ORAL at 08:24

## 2024-07-12 RX ADMIN — METFORMIN HYDROCHLORIDE 500 MG: 500 TABLET ORAL at 08:25

## 2024-07-12 RX ADMIN — TOPIRAMATE 150 MG: 25 TABLET, FILM COATED ORAL at 08:24

## 2024-07-12 RX ADMIN — DULOXETINE HYDROCHLORIDE 40 MG: 20 CAPSULE, DELAYED RELEASE ORAL at 08:24

## 2024-07-12 RX ADMIN — CARIPRAZINE 6 MG: 3 CAPSULE, GELATIN COATED ORAL at 08:24

## 2024-07-12 RX ADMIN — SPIRONOLACTONE 25 MG: 25 TABLET ORAL at 08:25

## 2024-07-12 RX ADMIN — Medication 5000 UNITS: at 08:24

## 2024-07-12 RX ADMIN — PANTOPRAZOLE SODIUM 40 MG: 40 TABLET, DELAYED RELEASE ORAL at 06:12

## 2024-07-12 RX ADMIN — LORATADINE 10 MG: 10 TABLET ORAL at 08:24

## 2024-07-12 RX ADMIN — LEVOTHYROXINE SODIUM 112 MCG: 112 TABLET ORAL at 06:12

## 2024-07-12 RX ADMIN — ACETAZOLAMIDE 500 MG: 250 TABLET ORAL at 08:24

## 2024-07-12 RX ADMIN — CYCLOSPORINE 1 DROP: 0 SOLUTION/ DROPS OPHTHALMIC; TOPICAL at 08:26

## 2024-07-12 RX ADMIN — IPRATROPIUM BROMIDE 2 SPRAY: 42 SPRAY, METERED NASAL at 08:23

## 2024-07-12 RX ADMIN — LINACLOTIDE 1 CAPSULE: 290 CAPSULE, GELATIN COATED ORAL at 08:26

## 2024-07-12 RX ADMIN — PROPRANOLOL HYDROCHLORIDE 10 MG: 10 TABLET ORAL at 08:25

## 2024-07-12 RX ADMIN — FOLIC ACID 1 MG: 1 TABLET ORAL at 08:25

## 2024-07-12 NOTE — PLAN OF CARE
Problem: Ineffective Coping  Goal: Identifies ineffective coping skills  Outcome: Adequate for Discharge  Goal: Identifies healthy coping skills  Outcome: Adequate for Discharge  Goal: Participates in unit activities  Description: Interventions:  - Provide therapeutic environment   - Provide required programming   - Redirect inappropriate behaviors   Outcome: Adequate for Discharge     Problem: Depression  Goal: Treatment Goal: Demonstrate behavioral control of depressive symptoms, verbalize feelings of improved mood/affect, and adopt new coping skills prior to discharge  Outcome: Adequate for Discharge  Goal: Verbalize thoughts and feelings  Description: Interventions:  - Assess and re-assess patient's level of risk   - Engage patient in 1:1 interactions, daily, for a minimum of 15 minutes   - Encourage patient to express feelings, fears, frustrations, hopes   Outcome: Adequate for Discharge  Goal: Refrain from isolation  Description: Interventions:  - Develop a trusting relationship   - Encourage socialization   Outcome: Adequate for Discharge  Goal: Attend and participate in unit activities, including therapeutic, recreational, and educational groups  Description: Interventions:  - Provide therapeutic and educational activities daily, encourage attendance and participation, and document same in the medical record   Outcome: Adequate for Discharge  Goal: Complete daily ADLs, including personal hygiene independently, as able  Description: Interventions:  - Observe, teach, and assist patient with ADLS  -  Monitor and promote a balance of rest/activity, with adequate nutrition and elimination   Outcome: Adequate for Discharge     Problem: Anxiety  Goal: Anxiety is at manageable level  Description: Interventions:  - Assess and monitor patient's anxiety level.   - Monitor for signs and symptoms (heart palpitations, chest pain, shortness of breath, headaches, nausea, feeling jumpy, restlessness, irritable,  apprehensive).   - Collaborate with interdisciplinary team and initiate plan and interventions as ordered.  - [place] patient to unit/surroundings  - Explain treatment plan  - Encourage participation in care  - Encourage verbalization of concerns/fears  - Identify coping mechanisms  - Assist in developing anxiety-reducing skills  - Administer/offer alternative therapies  - Limit or eliminate stimulants  Outcome: Adequate for Discharge     Problem: DEPRESSION  Goal: Will be euthymic at discharge  Description: INTERVENTIONS:  - Administer medication as ordered  - Provide emotional support via 1:1 interaction with staff  - Encourage involvement in milieu/groups/activities  - Monitor for social isolation  Outcome: Adequate for Discharge     Problem: Nutrition/Hydration-ADULT  Goal: Nutrient/Hydration intake appropriate for improving, restoring or maintaining nutritional needs  Description: Monitor and assess patient's nutrition/hydration status for malnutrition. Collaborate with interdisciplinary team and initiate plan and interventions as ordered.  Monitor patient's weight and dietary intake as ordered or per policy. Utilize nutrition screening tool and intervene as necessary. Determine patient's food preferences and provide high-protein, high-caloric foods as appropriate.     INTERVENTIONS:  - Monitor oral intake, urinary output, labs, and treatment plans  - Assess nutrition and hydration status and recommend course of action  - Evaluate amount of meals eaten  - Assist patient with eating if necessary   - Allow adequate time for meals  - Recommend/ encourage appropriate diets, oral nutritional supplements, and vitamin/mineral supplements  - Order, calculate, and assess calorie counts as needed  - Recommend, monitor, and adjust tube feedings and TPN/PPN based on assessed needs  - Assess need for intravenous fluids  - Provide specific nutrition/hydration education as appropriate  - Include patient/family/caregiver in  decisions related to nutrition  Outcome: Adequate for Discharge     Problem: DISCHARGE PLANNING  Goal: Discharge to home or other facility with appropriate resources  Description: INTERVENTIONS:  - Identify barriers to discharge w/patient and caregiver  - Arrange for needed discharge resources and transportation as appropriate  - Identify discharge learning needs (meds, wound care, etc.)  - Arrange for interpretive services to assist at discharge as needed  - Refer to Case Management Department for coordinating discharge planning if the patient needs post-hospital services based on physician/advanced practitioner order or complex needs related to functional status, cognitive ability, or social support system  Outcome: Adequate for Discharge

## 2024-07-12 NOTE — NURSING NOTE
AVS reviewed with pt. Belongings packed with staff, all personal belongings accounted for. Medications sent via e-prescribe, Home meds returned. Pt expresses readiness for discharge. Transport provided by [employer]

## 2024-07-12 NOTE — NURSING NOTE
"Pt denies SI/HI/AVH. She has been withdrawn to her room and appears depressed but denies this. She reports feeling anxious about discharge tomorrow as before coming IP she made the decision to take her long-time pet to a foster agency as she could no longer afford his expenses. Pt states this was like her therapy dog and she is worried about how she will adjust to him not being around. Pt also reports she feels \"worn out\" and only wanted to take her gabapentin @ HS and not other scheduled medications. Pt is otherwise looking forward to discharge.   "

## 2024-07-12 NOTE — DISCHARGE SUMMARY
Discharge Summary - Behavioral Health   Gita Rendon 38 y.o. female MRN: [id number]  Unit/Bed#: BHU -02 Encounter: [id number]     Admission Date: 2024         Discharge Date: 24    Attending Psychiatrist: Kash Aguilera DO    Reason for Admission/HPI (as per admission documentation):     Per ED provider note:  38 year old female with PMH anxiety, depression, OCD, bipolar disorder, HTN, GERD, obesity, migraines presenting ambulatory from home for evaluation of depression.  Pt reports she has been dealing with depression for quite some time.  She feels it has gotten worse since last October.  She reports she went inpatient psych in April and was there for about 2 weeks but feels maybe this wasn't long enough.  She reports she has been seeing her therapist and her psychiatrist has adjusted her meds without relief.  She reports she was on lithium but her levels were high and she was taken off this medication.  She lives at home by herself.  She reports she just filed for bankruptcy.  She reports she had a 10 year old senior special needs dog but today took it to a shelter as she states she is unable to care for it or care for it financially.  She reports her dad  in  due to covid.  She notes her mom struggles with mental illness and is currently living in a personal care home but is not herself and doesn't care about anyone.  She states she is depressed.  She hasn't been eating much (believes weight loss meds play a role with this too).  She reports she just wants to sleep all the time.  She reports no interest in doing anything.  She notes she will go days without taking a bath. She states she is tired and just cries all the time.  She has had anxiety as well.  She reports experiencing panic attacks.  She feels she needs to go back for inpatient treatment.  She denies fever, chills, cough, congestion or recent illness.  Denies chest pain, SOB, N/V/D, abdominal pain.  No reported urinary  complaints.  She notes a couple weeks ago when lithium level was elevated, it was rechecked after discontinuing the medication with improvement of the level.  She denies any suicidal ideation or attempt.  No homicidal ideation.  No hallucinations.  She denies taking any excess of her medications.  She denies any ingestion of drugs or illicit substances.       Per Crisis worker note:  Crisis met with pt and discussed Crisis Intake and Safety Risk Assessment. Introduce self, role, and evaluation process. Pt is a 38 year old female who presents in ED with complaints of worsening depression and anxiety.  Pt states for the past several months she has been having an increase in sadness, feeling lonely, not wanting to get out of bed. She was seen by her psychiatrist at [facility] two weeks ago.  Her Lexapro medication was discontinued, and her doctor prescribe Vybrid.  Patient states last in patient admission was in April 2024 and was admitted at [facility].  She denies suicidal or homicidal thoughts, auditory or visual hallucination or thoughts to self harm. Pt also denies drug and alcohol use. Pt is requesting in patient admission.  Crisis discussed treatment options. Pt agreeable to signing 201 after rights and 72 hour noticed were discussed. Pt and ED Provider signed 201, sent to Intake for inpatient bed review.      On admission to Inpatient Psychiatric Unit:  Patient is a 37 y/o white female, with a PPHx of MDD, OCD, and EILEEN, with a past remote hx of Anorexia now in remission, who presents w/ increasing depression, anxiety, and SI in the setting of psychosocial stressors.     Symptoms prior to hospitalization include: depressed mood, anxiety, suicidal thoughts, hopelessness, helplessness, and OCD symptoms. Symptom severity is rated as severe. Timeline is worsening over the past several weeks to months. Mitigating factors are none. Exacerbating stressors are financial difficulties, giving away her 10 y/o dog, and grieving  the loss of her father and step-father 3 years ago.      On evaluation today, patient reports ongoing depression, anxiety, and passive SI w/o plan. She was recently put on Viibryd with poor effect. She wants to change medications but is aware that her stressors are very situational. Was taken off Lithium after level came back high in OP setting. Discussed plan to DC Viibryd and continue Vraylar, Gabapentin, and Topamax. Discussed starting Cymbalta next week if symptoms persist after a washout of Viibryd. Patient agreeable. She denies manic or psychotic sxs. Previous hx of cutting and anorexia. No current eating disordered behaviors or self harm.      Psychiatric Review Of Systems:  Medication side effects: none  Sleep: no change  Appetite: no change  Hygiene: able to tend to instrumental and basic ADLs  Anxiety Symptoms: +  Psychotic Symptoms: denies  Depression Symptoms: +  Manic Symptoms: denies  PTSD Symptoms: denies  Suicidal Thoughts: +  Homicidal Thoughts: denies     Medical Review Of Systems:   Patient denies headache or dizziness.   Patient denies chest pain or palpitations.  Patient denies difficulty breathing or wheezing.  Patient denies nausea, vomiting, or diarrhea.  Patient denies polyuria or polydipsia.  Patient denies weakness or numbness.  Pertinent positives as per HPI.      Past Medical History:   Diagnosis Date    Allergic     Allergic rhinitis     Anorexia nervosa in remission     Anxiety     Arthritis 5/8/2014    Back pain     Bipolar disorder (HCC)     Depression     Dermatitis 10/16/2021    Disease of thyroid gland     Diverticulitis of colon 9/20/2015    Dizziness     Ear problems     Eating disorder     Esophageal reflux 08/15/2013    GERD (gastroesophageal reflux disease) 8/15/2013    Headache(784.0)     Headache, tension-type     Hypertension     Hypothyroid     Idiopathic intracranial hypertension     Lumbar degenerative disc disease 05/08/2014    Migraine     Nasal congestion      Nosebleed     Obesity     Obsessive-compulsive disorder     Otitis media     Overactive bladder     Psychiatric disorder     Psychiatric illness     Restless leg syndrome, controlled     Seasonal allergies     Sinusitis     Sleep apnea     Sleep difficulties     Suicide and self-inflicted injury (HCC)     Tinnitus     TMJ dysfunction     Tonsillitis     Transcranial Magnetic Stimulation 09/06/2021    Urinary tract infection     Vision loss      Past Surgical History:   Procedure Laterality Date    DENTAL SURGERY      wisdom teeth-at [age] years. Other surgery dental extraxtion of bone spur (10 years ago)    IR LUMBAR PUNCTURE  02/26/2019    SINUS ENDOSCOPY      SINUS SURGERY      TONSILLECTOMY         Medications:    Current Facility-Administered Medications   Medication Dose Route Frequency Provider Last Rate    acetaminophen  650 mg Oral Q4H PRN [facility] Holter, DO      acetaminophen  975 mg Oral Q6H PRN [facility] Holter, DO      acetaZOLAMIDE  500 mg Oral BID [facility] Holter, DO      almotriptan  12.5 mg Oral Once PRN [facility] Holter, DO      aluminum-magnesium hydroxide-simethicone  30 mL Oral Q4H PRN [facility] Holter, DO      Atogepant  60 mg Oral Daily [facility] Holter, DO      benztropine  1 mg Intramuscular Q4H PRN Max 6/day Satnam C Holter, DO      bisacodyl  10 mg Rectal Daily PRN [facility] Holter, DO      cariprazine  6 mg Oral Daily [facility] Holter, DO      celecoxib  200 mg Oral BID [facility] Holter, DO      Cholecalciferol  5,000 Units Oral Daily [facility] Holter, DO      cycloSPORINE (PF)  1 drop Ophthalmic BID [facility] Holter, DO      hydrOXYzine HCL  50 mg Oral Q6H PRN Max 4/day [facility] Holter, DO      Or    diphenhydrAMINE  50 mg Intramuscular Q6H PRN [facility] Holter, DO      DULoxetine  40 mg Oral Daily Kash Aguilera, DO      folic acid  1 mg Oral Daily Satnam C Holter, DO      gabapentin  300 mg Oral After Breakfast Jordan C Holter, DO      gabapentin  900 mg Oral HS Satnam  Holter, DO       hydrocortisone   Topical BID [facility] Holter, DO      hydrOXYzine HCL  100 mg Oral Q6H PRN Max 4/day [facility] Lupister, DO      Or    LORazepam  2 mg Intramuscular Q6H PRN [facility] Lupister, DO      hydrOXYzine HCL  25 mg Oral Q6H PRN Max 4/day [facility] Lupister, DO      ipratropium  2 spray Each Nare Q12H YFN [facility] Lupister, DO      levothyroxine  112 mcg Oral Early Morning [facility] Lupister, DO      linaCLOtide  290 mcg Oral Daily Nancy Villa PA-C      loratadine  10 mg Oral Daily [facility] Holter, DO      melatonin  3 mg Oral HS PRN [facility] Lupister, DO      metFORMIN  500 mg Oral Daily With Breakfast [facility] Lupister, DO      Mirabegron ER  25 mg Oral Daily [facility] Lupister, DO      multivitamin stress formula  1 tablet Oral Daily [facility] Lupister, DO      OLANZapine  5 mg Oral Q4H PRN Max 3/day [facility] Lupister, DO      Or    OLANZapine  2.5 mg Intramuscular Q4H PRN Max 3/day [facility] Lupister, DO      OLANZapine  5 mg Oral Q3H PRN Max 3/day [facility] Lupister, DO      Or    OLANZapine  5 mg Intramuscular Q3H PRN Max 3/day [facility] Lupister, DO      OLANZapine  2.5 mg Oral Q4H PRN Max 6/day [facility] Lupister, DO      pantoprazole  40 mg Oral BID AC [facility]      polyethylene glycol  17 g Oral Daily PRN [facility] Lupister, DO      prochlorperazine  10 mg Oral BID PRN [facility] Lupister, DO      propranolol  10 mg Oral Daily [facility] Lupister, DO      Semaglutide-Weight Management  1.7 mg Subcutaneous Weekly Kash Aguilera, DO      senna-docusate sodium  1 tablet Oral Daily PRN [facility] Holter, DO      spironolactone  25 mg Oral Daily [facility] Lupister, DO      topiramate  150 mg Oral BID [facility] Lupister, DO         Allergies:     Allergies   Allergen Reactions    Doxycycline      Pt refuses d/t remote H/O intracranial hypertension     Other Other (See Comments)     Seasonal allergies       Vital signs in last 24 hours:    Temp:  [97.9 °F (36.6 °C)-98.8 °F (37.1 °C)] 98.8 °F (37.1 °C)  HR:  [57-63] 57  Resp:  [18] 18  BP:  (112-139)/(65-87) 139/87    No intake or output data in the 24 hours ending 07/12/24 0710    Hospital Course:     Gita was admitted to the inpatient psychiatric unit on 7/4/2024. During their hospitalization, Gita was encouraged to attend groups and interact appropriately and positively with others in the milieu.     Gita was started on the following psychiatric medications: Cymbalta titrated up to 40mg PO QD for dual benefit of mood, anxiety, and pain syndromes. Additionally, patient was continued on psychotropics that she was taking PTA, including Vraylar 6 mg p.o. daily, gabapentin 300 mg p.o. daily and 600 mg p.o. at bedtime, propranolol 10 mg p.o. daily, and Topamax 150 mg p.o. twice daily.  These medications were titrated up to a therapeutic range as evidenced by a reduction in Gita's reported psychiatric symptomatology. There were no side effects noted or reported throughout Gita's psychiatric hospitalization.     At the time of discharge, there were no criteria present through which Gita could be kept involuntarily for further psychiatric hospitalization. Patient was able to articulate a safety plan upon discharge home, including going to any ED if their symptoms return or worsen, or calling 911. We discussed mitigating factors against suicidal behavior, and the patient was able to articulate these mitigating factors which outweighed any potential exacerbating stressors. Patient explicitly denied suicidal ideation, plan, or intent. They explicitly stated they felt safe to return to the community.     An outpatient discharge/follow up plan was discussed and coordinated between Gita, this writer, and case management team.    Specific discharge disposition: Home. Patient is starting virtual PHP on Tuesday 7/16 at [facility]. She will then f/u with [facility]. Prior auth completed for Cymbalta.    Mental Status at Time of Discharge:     Appearance: casually dressed,  "appears consistent with stated age  Motor: no psychomotor disturbances, no gait abnormalities  Behavior: cooperative, interacts with this writer appropriately  Speech: normal rate, rhythm, and volume  Mood: \"good\"  Affect: euthymic, normal range and intensity  Thought Process: organized, linear, and goal-oriented  Thought Content: denies auditory or visual hallucinations  Perception: denies delusions or other perceptual disturbances  Risk Potential: denies suicidal ideation, plan, or intent. Denies homicidal ideation  Sensorium: Oriented to person, place, time, and situation  Cognition: cognitive ability appears intact but was not quantitatively tested  Consciousness: alert and awake  Attention: able to focus without difficulty  Insight: improved  Judgement: improved       Suicide/Homicide Risk Assessment:    Risk of Harm to Self:   Patient denied suicidal thoughts, intent, plan, or acts of furtherance at the time of discharge.    Risk of Harm to Others:  Patient denied homicidal thoughts or intent at the time of discharge      Admission Diagnosis:    Principal Problem:    Severe episode of recurrent major depressive disorder, without psychotic features ([facility])  Active Problems:    Obsessive-compulsive disorder    Hypertension    Hypothyroidism    Overactive bladder    Binge-eating disorder, severe    Gambling disorder, moderate    Esophageal reflux    EILEEN (generalized anxiety disorder)    Lumbar degenerative disc disease    Morbid obesity (HCC)    PTSD (post-traumatic stress disorder)    Urgency incontinence    Vitamin D deficiency    Iliotibial band syndrome of left side    Piriformis syndrome of left side    Skin lesion    Prediabetes    Family history of cancer    Chronic migraine without aura without status migrainosus, not intractable    CAIO (obstructive sleep apnea)    Cervicalgia    Numbness and tingling in both hands    Dermatitis    Chronic constipation    Thyroid disease neuropathy (HCC)    Migraine " headache    Medical clearance for psychiatric admission      Discharge Diagnosis:     Principal Problem:    Severe episode of recurrent major depressive disorder, without psychotic features (HCC)  Active Problems:    Obsessive-compulsive disorder    Hypertension    Hypothyroidism    Overactive bladder    Binge-eating disorder, severe    Gambling disorder, moderate    Esophageal reflux    EILEEN (generalized anxiety disorder)    Lumbar degenerative disc disease    Morbid obesity (HCC)    PTSD (post-traumatic stress disorder)    Urgency incontinence    Vitamin D deficiency    Iliotibial band syndrome of left side    Piriformis syndrome of left side    Skin lesion    Prediabetes    Family history of cancer    Chronic migraine without aura without status migrainosus, not intractable    CAIO (obstructive sleep apnea)    Cervicalgia    Numbness and tingling in both hands    Dermatitis    Chronic constipation    Thyroid disease neuropathy (HCC)    Migraine headache    Medical clearance for psychiatric admission  Resolved Problems:    * No resolved hospital problems. *      Laboratory Results: I have personally reviewed all pertinent lab results.    No results found for this or any previous visit (from the past 48 hour(s)).     Discharge Medications:    See after visit summary for all reconciled discharge medications provided to patient and family.      Current Discharge Medication List        START taking these medications    Details   DULoxetine 40 MG CPEP Take 1 capsule (40 mg total) by mouth daily  Qty: 30 capsule, Refills: 0    Associated Diagnoses: Severe episode of recurrent major depressive disorder, without psychotic features (HCC)      gabapentin (NEURONTIN) 300 mg capsule Take 1 capsule (300 mg total) by mouth daily after breakfast    Associated Diagnoses: Severe episode of recurrent major depressive disorder, without psychotic features (HCC)      ipratropium (ATROVENT) 0.06 % nasal spray 2 sprays into each nostril  every 12 (twelve) hours    Associated Diagnoses: Medical clearance for psychiatric admission              Current Discharge Medication List        STOP taking these medications       escitalopram (LEXAPRO) 20 mg tablet Comments:   Reason for Stopping:         furosemide (LASIX) 20 mg tablet Comments:   Reason for Stopping:         lithium carbonate (LITHOBID) 450 mg CR tablet Comments:   Reason for Stopping:         loteprednol etabonate (LOTEMAX) 0.5 % ophthalmic suspension Comments:   Reason for Stopping:                Current Discharge Medication List        CONTINUE these medications which have CHANGED    Details   propranolol (INDERAL) 10 mg tablet Take 1 tablet (10 mg total) by mouth in the morning    Associated Diagnoses: Severe episode of recurrent major depressive disorder, without psychotic features (HCC)              Current Discharge Medication List        CONTINUE these medications which have NOT CHANGED    Details   acetaZOLAMIDE (DIAMOX) 250 mg tablet Take 2 tablets (500 mg total) by mouth 2 (two) times a day  Qty: 120 tablet, Refills: 2    Associated Diagnoses: Intracranial hypertension      almotriptan (AXERT) 12.5 MG tablet Take 12.5 mg by mouth once as needed for migraine may repeat in 2 hours if needed.  Limited to 2 x a week      Atogepant (Qulipta) 60 MG TABS Take 60 mg by mouth in the morning      cariprazine (VRAYLAR) 6 MG capsule Take 1 capsule (6 mg total) by mouth daily  Qty: 30 capsule, Refills: 0    Associated Diagnoses: Depression      celecoxib (CeleBREX) 200 mg capsule Take 1 capsule (200 mg total) by mouth 2 (two) times a day  Qty: 60 capsule, Refills: 0    Associated Diagnoses: Chronic migraine without aura without status migrainosus, not intractable      Cequa 0.09 % SOLN Apply 1 drop to eye 2 (two) times a day  Qty: 60 each, Refills: 0    Associated Diagnoses: Dry eyes      cetirizine (ZyrTEC) 10 MG chewable tablet Chew 10 mg daily      cholecalciferol 1,000 units tablet Take 5  tablets (5,000 Units total) by mouth daily  Qty: 150 tablet, Refills: 0    Associated Diagnoses: Vitamin D deficiency      gabapentin (NEURONTIN) 600 MG tablet Take 1.5 tablets (900 mg total) by mouth daily at bedtime  Qty: 45 tablet, Refills: 0    Associated Diagnoses: Numbness and tingling in both hands      hydrocortisone (ANUSOL-HC) 2.5 % rectal cream Apply topically 2 (two) times a day  Qty: 30 g, Refills: 5    Associated Diagnoses: Chronic constipation      ipratropium (ATROVENT) 0.03 % nasal spray 2 sprays into each nostril every 12 (twelve) hours  Qty: 30 mL, Refills: 0    Associated Diagnoses: Rhinorrhea      levothyroxine 112 mcg tablet Take 1 tablet (112 mcg total) by mouth daily in the early morning  Qty: 90 tablet, Refills: 1    Associated Diagnoses: Acquired hypothyroidism      linaCLOtide (Linzess) 290 MCG CAPS Take 1 capsule by mouth daily  Qty: 90 capsule, Refills: 1    Associated Diagnoses: Chronic constipation      metFORMIN (GLUCOPHAGE) 500 mg tablet Take 1 tablet (500 mg total) by mouth daily with breakfast  Qty: 90 tablet, Refills: 1    Associated Diagnoses: Abnormal weight gain; IFG (impaired fasting glucose); Binge-eating disorder, severe; Morbid (severe) obesity due to excess calories (HCC)      Mirabegron ER 25 MG TB24 Take 25 mg by mouth daily  Qty: 30 tablet, Refills: 0    Associated Diagnoses: Overactive bladder      Multiple Vitamin (MULTIVITAMIN) capsule Take 1 capsule by mouth daily      omeprazole (PriLOSEC) 20 mg delayed release capsule Take 1 capsule (20 mg total) by mouth 2 (two) times a day  Qty: 180 capsule, Refills: 1    Associated Diagnoses: Gastroesophageal reflux disease without esophagitis      prochlorperazine (COMPAZINE) 10 mg tablet Take 10 mg by mouth every 6 (six) hours as needed for nausea or vomiting Take 1 tablet by TID if needed for headache or nausea. Limit to 2x a week .      Semaglutide-Weight Management (WEGOVY) 1.7 MG/0.75ML Inject 0.75 mL (1.7 mg total) under  the skin once a week  Qty: 3 mL, Refills: 1    Associated Diagnoses: Morbid obesity (HCC)      spironolactone (ALDACTONE) 25 mg tablet Take 1 tablet (25 mg total) by mouth daily  Qty: 30 tablet, Refills: 0    Associated Diagnoses: Hypertension, unspecified type      topiramate (TOPAMAX) 100 mg tablet TAKE 1.5 TABLETS BID              Discharge instructions/Information to patient and family:     See after visit summary for information provided to patient and family.      Provisions for Follow-Up Care:    See after visit summary for information related to follow-up care and any pertinent home health orders.      Discharge Statement:    I spent 75 minutes discharging the patient. This time was spent on the day of discharge. I had direct contact with the patient on the day of discharge.     Additional documentation is required if more than 30 minutes were spent on discharge:    I had face-to-face contact with the patient and discussed discharge treatment plan  I reviewed the importance of compliance with medications and outpatient treatment after discharge with the patient.  I discussed discharge and treatment plan with the patient, nursing, and case management/social work.  I discussed the medication regimen and possible side effects of the medications with the patient prior to discharge. At the time of discharge they were tolerating psychiatric medications.  I discussed outpatient follow up with the patient as per treatment plan / aftercare summary.  I reviewed elements of the aftercare plan with the patient. I discussed that if symptoms worsen or reoccur, that the patient can return to the emergency room or dial 911 in an emergency.  I reviewed pertinent mitigating vs exacerbating stressors, as well as other risk factors, as they relate to potential suicidal behavior.  I reviewed the patient's hospital course and current psychiatric disease status. As of today, they are now at goal. They are psychiatrically stable  for discharge home. The combination of active psychopharmacological medication management, interdisciplinary coordination with case management, and adjunctive milieu and group therapy to augment psychopharmacological efficacy appears to have been successful. The patient's risk of morbidity, and progression or decompensation of psychiatric disease, is lower today as compared to the day of admission.      Kash Aguilera DO 07/12/24

## 2024-07-12 NOTE — PROGRESS NOTES
07/12/24 0750   Team Meeting   Meeting Type Daily Rounds   Team Members Present   Team Members Present Physician;Nurse;;Other (Discipline and Name)   Physician Team Member Dr. Rocha / Dr. Aguilera / YANG Rosado / TRACY Student   Nursing Team Member Graciela / Fanny   Care Management Team Member Nestor / Chari   Other (Discipline and Name) Lavern (Group Facilitator)   Patient/Family Present   Patient Present No   Patient's Family Present No       D/C: Pt is scheduled to discharge today 7/12/2024.

## 2024-07-12 NOTE — BH TRANSITION RECORD
Contact Information: If you have any questions, concerns, pended studies, tests and/or procedures, or emergencies regarding your inpatient behavioral health visit. Please contact [place] [facility] [phone] and ask to speak to a , nurse or physician. A contact is available 24 hours/ 7 days a week at this number.     Summary of Procedures Performed During your Stay:  Below is a list of major procedures performed during your hospital stay and a summary of results:  - No major procedures performed.    Potassium - 3.4  HDL - 40  LDL - 101    Pending Studies (From admission, onward)      None          Please follow up on the above pending studies with your PCP and/or referring provider.

## 2024-07-12 NOTE — NURSING NOTE
Pt is awake, alert, pleasant during interactions. Reports readiness for discharge. Denies SI, HI, AVH. Completed ADLs this morning. Pt asking about Home medications being returned from Pharmacy, and the Wegovy which requires refrigeration. Will provide ice packs to keep cold during transportation home.

## 2024-07-15 DIAGNOSIS — N32.81 OVERACTIVE BLADDER: ICD-10-CM

## 2024-07-15 NOTE — PSYCH
Visit Time    Visit Start Time: 1215  Visit Stop Time: 1315  Total Visit Duration: 60 minutes    Subjective:     Patient ID: Gita Rendon is a 38 y.o. female.    [facility] Clinical Progress Notes      Specialized Services Documentation  Therapist must complete separate progress note for each specific clinical activity in which the individual participated during the day.       Group Psychotherapy Life Skills (1565-2090) Gita Rendon actively engaged in group focused on the drama triangle which was facilitated virtually in a private office using HIPAA Compliant and Approved [employer] Teams. Gita consented to the use of tele-video modality of treatment and was virtually present for group psychotherapy today. The group learned about the drama triangle and the roles of the victim, rescuer, and persecutor. Gita identified having been in the role of a victim. During the activity group members learned how to stop the drama triangle from continuing. Gita Rendon  identified ways that they would stop the drama triangle from occuring. Gita continues to make progress towards goals through verbal participation in group; to accomplish long term goals continue to utilize skills learned in programming. Continue with psychotherapy to educate and encourage use of wellness tools. Tx Plan Objective: 1.1,1.2 Therapist: Gita Vallejo,MSW, LSW

## 2024-07-16 ENCOUNTER — TELEMEDICINE (OUTPATIENT)
Dept: PSYCHIATRY | Facility: CLINIC | Age: 39
End: 2024-07-16

## 2024-07-16 ENCOUNTER — TELEMEDICINE (OUTPATIENT)
Dept: PSYCHOLOGY | Facility: CLINIC | Age: 39
End: 2024-07-16
Payer: COMMERCIAL

## 2024-07-16 DIAGNOSIS — F33.2 MAJOR DEPRESSIVE DISORDER, RECURRENT, SEVERE WITHOUT PSYCHOTIC FEATURES (HCC): Primary | ICD-10-CM

## 2024-07-16 DIAGNOSIS — F43.21 GRIEF: ICD-10-CM

## 2024-07-16 DIAGNOSIS — F33.2 MAJOR DEPRESSIVE DISORDER, RECURRENT SEVERE WITHOUT PSYCHOTIC FEATURES (HCC): Primary | ICD-10-CM

## 2024-07-16 DIAGNOSIS — F41.1 GAD (GENERALIZED ANXIETY DISORDER): ICD-10-CM

## 2024-07-16 PROCEDURE — G0176 OPPS/PHP;ACTIVITY THERAPY: HCPCS

## 2024-07-16 PROCEDURE — 90834 PSYTX W PT 45 MINUTES: CPT

## 2024-07-16 RX ORDER — MIRABEGRON 25 MG/1
25 TABLET, FILM COATED, EXTENDED RELEASE ORAL DAILY
Qty: 30 TABLET | Refills: 5 | Status: SHIPPED | OUTPATIENT
Start: 2024-07-16

## 2024-07-16 RX ORDER — DULOXETIN HYDROCHLORIDE 60 MG/1
60 CAPSULE, DELAYED RELEASE ORAL DAILY
Qty: 30 CAPSULE | Refills: 1 | Status: SHIPPED | OUTPATIENT
Start: 2024-07-16

## 2024-07-16 NOTE — PSYCH
Subjective:     Patient ID: Gita Rendon is a 38 y.o. female.    [facility] Clinical Progress Notes      Specialized Services Documentation  Therapist must complete separate progress note for each specific clinical activity in which the individual participated during the day.     Case Management Note    MAXIMO Chairez MT-BC    Current suicide risk : Low     [id number] Met with Gita Rendon. Reviewed program and given on call number. she completed releases and OP providers/ PCP notified of admission and health care coordination form completed. Completed initial psycho-social evaluation and initial treatment goals discussed.     I,Gita Rendon,am physically unable to provide a signature; however, I understand the nature of and am in agreement with the documentation presented to me via TEAMS.  I have received a copy through My Chart and/or the [employer] Postal [employer].  I freely give verbal consent.  Name of Document (s): New patient packet, Healthcare coordination form, ROIs  Witness to verbal consent: MAXIMO Chairez MT-BC  Witness to verbal consent: Gita Vallejo    Medications changes/added/denied? Yes, see Dr Taveras Note    Treatment session number: 1    Individual Case Management Visit provided today? Yes     [facility] follow up physician's orders: See Dr Taveras note

## 2024-07-16 NOTE — PSYCH
Subjective:     Patient ID: Elizabeth Rendon is a 38 y.o. female.     [facility] Clinical Progress Notes      Specialized Services Documentation  Therapist must complete separate progress note for each specific clinical activity in which the individual participated during the day.      GROUP PSYCHOTHERAPY    [id number]  Total Visit Duration: 55 minutes    Elizabeth Rendon participated with prompts in psychotherapy group.  This was observedConsistent throughout the treatment day. They were engaged in learning related to Wellness Tools. Staff utilized Demonstration teaching methods.  Elizabeth Rendon shared area of learning and set a goal for outside of program to make phone calls and get her medication.  Elizabeth Rendon was able to share items explored during the day and shared in reflecting on three accomplishments.  Ended session with staff led exercise of movement.  Elizabeth Rendon demonstrated good progress toward goal.  Continue group psychotherapy to actively practice learned skills and encourage transfer of knowledge to unstructured time.      Tx Plan Objective: 1.1,1.2,1.4, Therapist: ARACELY Pate

## 2024-07-16 NOTE — PSYCH
EDUCATION THERAPY    [id number]    Gita Rendon was not present for this session.    Treatment Plan Objective 1.1, 1.2, 1.3, 1.4 , Therapist: Noam Sandhu M.Ed.    Group Psychotherapy    Subjective:     Patient ID: Gita Rendon 38 y.o.    This group was facilitated virtually in a private office using HIPAA Compliant and Approved [employer] Teams.  (9:30-10:30)Gita Rendon was not present for this session motivation. The skill helps to explore purpose of motivation and the limiting beliefs that hold back motivation The group discovered strategies that help them to develop motivation and the potential barriers on a short term and long term basis. The group talked about understanding the purpose, and meaning of motivation in intellectual and emotional expression, regulation , and recognition, and how it affects themselves and others. Teaching on the emphasis of self monitoring , suggestive solutions, verbal and non-verbal communication, keeping commitments, and strategies were discusses to reduce limited motivation.  Group was encouraged to ask questions in an open forum at the end of group.  Gita Rendon will continue to engage in psychotherapy to encourage positive self realization.  Treatment Plan Objective 1.1, 1.2, 1.3, 1.4 Therapist: Noam MILLER Ed.

## 2024-07-16 NOTE — BH TREATMENT PLAN
"Assessment/Plan:      Diagnoses and all orders for this visit:    Major depressive disorder, recurrent, severe without psychotic features (HCC)    EILEEN (generalized anxiety disorder)          Subjective:     Patient ID: Gita Rendon is a 38 y.o. female.    [facility] Treatment Plan   AREAS OF NEED: depression and anxiety as evidenced by poor appetite, loss of interests, poor motivation, isolation, crying \"all the time\", excess sleep, anxious thoughts, worrying about everything, low mood. Stressors of Dad's passing in 2021, mother's declining health (mental and physical), stepfathers passing, breakup with Girlfriend and continuing to live with her, feeling isolated with minimal support, and a recent bankruptcy filing.   Date Initiated: 07/16/24    Strengths: \"I am resilient, confident and humorous\"     LONG TERM GOAL:   Date Initiated: 07/16/24  1.0 I will identify 3 signs that my depression and anxiety have improved since starting program.   Target Date: 8/13/24  Completion Date:       SHORT TERM OBJECTIVES:     Date Initiated: 07/16/24  1.1 I create and follow a daily routine that prioritizes self care and interacting with others to begin building structure and purpose into my day.   Revision Date:   Target Date: 7/25/24  Completion Date:     Date Initiated: 07/16/24  1.2 I will identify at least 1 support group I would like to attend and attend at least 1 meeting prior to discharge.   Revision Date:   Target Date: 7/25/24  Completion Date:     Date Initiated: 07/16/24  1.3 I will take medication as prescribed and share questions and concerns if they arise.  Revision Date:  Target Date: 7/25/24  Completion Date:      Date Initiated: 07/16/24  1.4 I will identify 3 ways my supports can assist in my recovery and agree to staff/support contact as indicated.   Revision Date:  Target Date: 7/25/24  Completion Date:           7 DAY REVISION:    Date Initiated:  Revision Date:   Target Date:   Completion " Date:      PSYCHIATRY:  Date Initiated: 07/16/24  Medication Management and Education       Revision Date:   The person(s) responsible for carrying out the plan is Christiano Taveras DO; Sary Kenney PA-C    NURSING/SYMPTOM EDUCATION:  Date Initiated: 07/16/24  1.1, 1.2. 1.3, 1.4 This RN and/or Therapist will provide wellness/symptoms and skill education groups three to five days weekly to educate Gita A Justice on signs and symptoms of diagnoses, skills to manage, and medication questions that will be addressed by the treatment team.    Revision date:  The person(s) responsible for carrying out the plan is Juliet Zamorano RN, ARACELY Pate; LYLA Sanchez ED    PSYCHOLOGY:   Date Initiated: 07/16/24       1.1, 1.2, 1.4 Provide psychotherapy group 5 times per week to allow opportunity for Gita Rendon  to explore stressors and ways of coping.   Revision Date:   The person(s) responsible for carrying out the plan is ISA Matute, MELISSA; Ashley Costenbader, MA LMT    ALLIED THERAPY:   Date Initiated: 07/16/24  1.1,1.2 Engage Gita UNIQUE Justice in AT group 3-5 times per week to encourage development and use of wellness tools to decrease symptoms and promote recovery through meaningful activity.  Revision Date:   The person(s) responsible for carrying out the plan is JHONATAN Scott; MAXIMO Chairez MT-BC    CASE MANAGEMENT:   Date Initiated: 07/16/24      1.0 This  will meet with Gita UNIQUE Justice  3-4 times weekly to assess treatment progress, discharge planning, connection to community supports and UR as indicated.  Revision Date:   The person(s) responsible for carrying out the plan is MAXIMO Chairez MT-BC      TREATMENT REVIEW/COMMENTS:     DISCHARGE CRITERIA:Identify 3 signs of progress and complete relapse prevention plan.   DISCHARGE PLAN: Discharge to OP therapy and psychiatry  Estimated Length of Stay: 10 treatment days           Diagnosis and Treatment Plan  explained to Gita, Gita relates understanding diagnosis and is agreeable to Treatment Plan.         CLIENT COMMENTS / Please share your thoughts, feelings, need and/or experiences regarding your treatment plan with Staff.  Please see follow up note with comments.      Signatures can be found on [facility] Treatment plan consent form.

## 2024-07-16 NOTE — PSYCH
"Visit Time    Visit Start Time: 1045  Visit Stop Time: 1145  Total Visit Duration:  60 minutes    Subjective:     Patient ID: Gita Rendon is a 38 y.o. female.    [facility] Clinical Progress Notes      Specialized Services Documentation  Therapist must complete separate progress note for each specific clinical activity in which the individual participated during the day.       Group Psychotherapy  This group was facilitated virtually in a private office using HIPAA Compliant and Approved [employer] Teams.  Gita Rendon  consented to the use of tele-video modality of treatment.  ([id number]) Gita Rendon actively shared in psychoeducational group which focused on nutrition to improve physical and mental wellbeing. Topics included:  How our diet affects our mental health  Mental health benefits of healthy eating  Barriers to eating healthy  Ways to overcome barriers  Macro and Micro nutrients   Appropriate serving sizes for all food groups as well as benefits to eating for health       The group then viewed a TONIE talk video titled \"The Brain Intervention at the End of our [place]\" by Dr Raulito Noble. They then discussed ideas on how to improve on their nutrition.  Some progress toward goal noted.  Continue psychoeducation to educate on the importance of making nutrition a priority.  Tx Plan Objectives: 1.1,1.2    Therapist: Juliet PROCTOR      "
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
## 2024-07-16 NOTE — PSYCH
[facility] Authorization for Treatment      Subjective:     Patient ID: Gita Rendon is a 38 y.o. female.    Fax placed to [phone] ([employer])  Fax number: [phone]  Tax ID and/or NPI used NPI [id number] (Adult [facility])  Location: [address] [place] [id number]  15 Days requested 7/16/24 through 8/5/24  Level of Care [facility]    Clinical Faxed yes      Awaiting further authorization information    Therapist: MAXIMO Chairez, MT-BC

## 2024-07-16 NOTE — PSYCH
Virtual Regular Visit    Verification of patient location:    Patient is located at Home in the following state in which I hold an active license [place]      Assessment/Plan:    Problem List Items Addressed This Visit       EILEEN (generalized anxiety disorder)    Grief    Major depressive disorder, recurrent, severe without psychotic features (HCC) - Primary       Goals addressed in session:           Reason for visit is PHP VIRTUAL GROUP DUE TO virtual preference of care      Encounter provider [facility] PARTIAL PSYCH PROGRAM      Recent Visits  No visits were found meeting these conditions.  Showing recent visits within past 7 days and meeting all other requirements  Future Appointments  No visits were found meeting these conditions.  Showing future appointments within next 150 days and meeting all other requirements       The patient was identified by name and date of birth. Gita Rendon was informed that this is a telemedicine visit and that the visit is being conducted throughthe Microsoft Teams platform. She agrees to proceed..  My office door was closed. No one else was in the room.  She acknowledged consent and understanding of privacy and security of the video platform. The patient has agreed to participate and understands they can discontinue the visit at any time.    Patient is aware this is a billable service.     Subjective  Gita Rendon is a 38 y.o. female  .      HPI     Past Medical History:   Diagnosis Date    Allergic     Allergic rhinitis     Anorexia nervosa in remission     Anxiety     Arthritis 5/8/2014    Back pain     Bipolar disorder (HCC)     Depression     Dermatitis 10/16/2021    Disease of thyroid gland     Diverticulitis of colon 9/20/2015    Dizziness     Ear problems     Eating disorder     Esophageal reflux 08/15/2013    GERD (gastroesophageal reflux disease) 8/15/2013    Headache(784.0)     Headache, tension-type     Hypertension     Hypothyroid     Idiopathic intracranial  hypertension     Lumbar degenerative disc disease 05/08/2014    Migraine     Nasal congestion     Nosebleed     Obesity     Obsessive-compulsive disorder     Otitis media     Overactive bladder     Psychiatric disorder     Psychiatric illness     Restless leg syndrome, controlled     Seasonal allergies     Sinusitis     Sleep apnea     Sleep difficulties     Suicide and self-inflicted injury (HCC)     Tinnitus     TMJ dysfunction     Tonsillitis     Transcranial Magnetic Stimulation 09/06/2021    Urinary tract infection     Vision loss        Past Surgical History:   Procedure Laterality Date    DENTAL SURGERY      wisdom teeth-at [age] years. Other surgery dental extraxtion of bone spur (10 years ago)    IR LUMBAR PUNCTURE  02/26/2019    SINUS ENDOSCOPY      SINUS SURGERY      TONSILLECTOMY         Current Outpatient Medications   Medication Sig Dispense Refill    acetaZOLAMIDE (DIAMOX) 250 mg tablet Take 2 tablets (500 mg total) by mouth 2 (two) times a day 120 tablet 2    almotriptan (AXERT) 12.5 MG tablet Take 12.5 mg by mouth once as needed for migraine may repeat in 2 hours if needed.  Limited to 2 x a week      Atogepant (Qulipta) 60 MG TABS Take 60 mg by mouth in the morning      cariprazine (VRAYLAR) 6 MG capsule Take 1 capsule (6 mg total) by mouth daily 30 capsule 0    celecoxib (CeleBREX) 200 mg capsule Take 1 capsule (200 mg total) by mouth 2 (two) times a day 60 capsule 0    Cequa 0.09 % SOLN Apply 1 drop to eye 2 (two) times a day 60 each 0    cetirizine (ZyrTEC) 10 MG chewable tablet Chew 10 mg daily      cholecalciferol 1,000 units tablet Take 5 tablets (5,000 Units total) by mouth daily 150 tablet 0    DULoxetine (CYMBALTA) 60 mg delayed release capsule Take 1 capsule (60 mg total) by mouth daily 30 capsule 1    DULoxetine 40 MG CPEP Take 1 capsule (40 mg total) by mouth daily 30 capsule 0    gabapentin (NEURONTIN) 300 mg capsule Take 1 capsule (300 mg total) by mouth daily after breakfast       gabapentin (NEURONTIN) 600 MG tablet Take 1.5 tablets (900 mg total) by mouth daily at bedtime 45 tablet 0    hydrocortisone (ANUSOL-HC) 2.5 % rectal cream Apply topically 2 (two) times a day 30 g 5    ipratropium (ATROVENT) 0.03 % nasal spray 2 sprays into each nostril every 12 (twelve) hours 30 mL 0    ipratropium (ATROVENT) 0.06 % nasal spray 2 sprays into each nostril every 12 (twelve) hours      levothyroxine 112 mcg tablet Take 1 tablet (112 mcg total) by mouth daily in the early morning 90 tablet 1    linaCLOtide (Linzess) 290 MCG CAPS Take 1 capsule by mouth daily 90 capsule 1    metFORMIN (GLUCOPHAGE) 500 mg tablet Take 1 tablet (500 mg total) by mouth daily with breakfast 90 tablet 1    Mirabegron ER 25 MG TB24 Take 25 mg by mouth daily 30 tablet 5    Multiple Vitamin (MULTIVITAMIN) capsule Take 1 capsule by mouth daily      omeprazole (PriLOSEC) 20 mg delayed release capsule Take 1 capsule (20 mg total) by mouth 2 (two) times a day 180 capsule 1    prochlorperazine (COMPAZINE) 10 mg tablet Take 10 mg by mouth every 6 (six) hours as needed for nausea or vomiting Take 1 tablet by TID if needed for headache or nausea. Limit to 2x a week .      propranolol (INDERAL) 10 mg tablet Take 1 tablet (10 mg total) by mouth in the morning      Semaglutide-Weight Management (WEGOVY) 1.7 MG/0.75ML Inject 0.75 mL (1.7 mg total) under the skin once a week 3 mL 1    spironolactone (ALDACTONE) 25 mg tablet Take 1 tablet (25 mg total) by mouth daily 30 tablet 0    topiramate (TOPAMAX) 100 mg tablet TAKE 1.5 TABLETS BID       No current facility-administered medications for this visit.        Allergies   Allergen Reactions    Doxycycline      Pt refuses d/t remote H/O intracranial hypertension     Other Other (See Comments)     Seasonal allergies       Review of Systems    Video Exam    There were no vitals filed for this visit.    Physical Exam     I spent FOUR GROUP HOURS PLUS CASE MANAGEMENT minutes with patient today in  which greater than 50% of time was spent in counseling/coordination of care regarding PHP - SEE NOTES

## 2024-07-17 ENCOUNTER — TELEMEDICINE (OUTPATIENT)
Dept: PSYCHOLOGY | Facility: CLINIC | Age: 39
End: 2024-07-17
Payer: COMMERCIAL

## 2024-07-17 DIAGNOSIS — F43.21 GRIEF: ICD-10-CM

## 2024-07-17 DIAGNOSIS — F41.1 GAD (GENERALIZED ANXIETY DISORDER): Primary | ICD-10-CM

## 2024-07-17 DIAGNOSIS — F33.2 MAJOR DEPRESSIVE DISORDER, RECURRENT, SEVERE WITHOUT PSYCHOTIC FEATURES (HCC): ICD-10-CM

## 2024-07-17 PROCEDURE — G0177 OPPS/PHP; TRAIN & EDUC SERV: HCPCS

## 2024-07-17 NOTE — PSYCH
Subjective:     Patient ID: Gita Rendon is a 38 y.o. female.    [facility] Clinical Progress Notes      Specialized Services Documentation  Therapist must complete separate progress note for each specific clinical activity in which the individual participated during the day.     Case Management Note    MAXIMO Chairez MT-BC    Current suicide risk : Low     [id number] Met with Elizabeth for case management. She stated the first few days of group have been good. She stated that the drama and empowerment triangle groups have stood out as relevant to her. Treatment plan reviewed and signed.     I,Gita Rendon,am physically unable to provide a signature; however, I understand the nature of and am in agreement with the documentation presented to me via TEAMS.  I have received a copy through My Chart and/or the [employer].  I freely give verbal consent.  Name of Document (s): treatment plan  Witness to verbal consent: MAXIMO Chairez MT-BC  Witness to verbal consent: Gita Vallejo    Medications changes/added/denied? No    Treatment session number: 2    Individual Case Management Visit provided today? Yes     [facility] follow up physician's orders: None at this time.

## 2024-07-17 NOTE — PSYCH
Visit Time    Visit Start Time: 1045  Visit Stop Time: 1145  Total Visit Duration:  60 minutes    Subjective:     Patient ID: Gita Rendon is a 38 y.o. female.    [facility] Clinical Progress Notes      Specialized Services Documentation  Therapist must complete separate progress note for each specific clinical activity in which the individual participated during the day.     Group Psychotherapy  This group was facilitated virtually in a private office using HIPAA Compliant and Approved Microsoft Teams. Gita Rendon consented to the use of tele-video modality of treatment  ([id number]) Gita Rendon attended group- Wellness Recovery Action Plan. Today, members focused on completing WRAP's toolbox then following specific sections and encouraged to fill in coping tools from their toolbox for planned responses:   Daily Plan       Group members shared pieces of information from these sections and identified their importance. Writer encouraged the members of group to continue utilizing the packet to develop plans inside and outside of program. Some effort towards progress of goals which were displayed through participation and engagement in topic.  Treatment Plan Objectives: 1.1, 1.2  Therapist: Juliet PROCTOR RN

## 2024-07-17 NOTE — PSYCH
[facility] Insurance Authorization for Treatment      Subjective:     Patient ID: Gita Rendon is a 38 y.o. female.    Voicemail received from [employer]  Phone number: [phone]  15 Days Approved 7/16/24 through 8/5/24  Level of Care PHP    Review on 8/5/24  Reviewer Assigned: [place]  Phone number: [phone]    Authorization # [id number]    Therapist: MAXIMO Chairez, MT-BC

## 2024-07-17 NOTE — PSYCH
EDUCATION THERAPY    [id number]    Gita Rendon actively shared in morning assessment and goal review. Presented as Receptive related to readiness to learn. Gita Rendon did not present with any barriers to learning. Throughout morning group, Gita Rendon participated in mindfulness exercise. Gita Rendon made positive progress toward goal, Elizabeth was able to reschedule one of her doctors appointments and stated she gained responsibility and advocacy. Continue education group to assess willingness to engage, assess transfer of knowledge, and participate in skill development.    Treatment Plan Objective 1.1, 1.2, 1.3, 1.4 , Therapist: Noam Sandhu M.Ed.    GROUP PSYCHOTHERAPY    [id number]    Gita Rendon participated actively in psychotherapy group.  This was observedConsistent throughout the treatment day. They were engaged in learning related to Wellness Tools. Staff utilized Verbal teaching methods.  Gita Rendon shared area of learning and set a goal for outside of program to do the dishes due to the washer being broken.  Gita Rendon was able to share items explored during the day and shared in adding to their WRAP.  Ended session with staff led exercise on breathing and mindful meditation.  Gita Rendon demonstrated positive progress toward goal.  Continue group psychotherapy to actively practice learned skills and encourage transfer of knowledge to unstructured time.    Treatment Plan Objective 1.1, 1.2, 1.3, 1.4 , Therapist: Noam Sandhu M.Ed    Group Psychotherapy  Group Therapy    Subjective:     Patient ID: Gita Rendon 38 y.o.       This group was facilitated virtually in a private office using HIPAA Compliant and Approved Microsoft Teams.  ([id number])Gita Rendon actively engaged in psychoeducational group about coping with loneliness. The skill helps to identify way to decrease feeling of loneliness and behavior change focused toward a specified goal.  Group explored the identifying factors that create loneliness, this process can help them to take care of emotional and intellectual moments of loneliness on a short term and long term basis. The group talked about understanding the meaning of loneliness in regards to physical, intellectual, and emotional expression, regulation , and recognition, and how it affects themselves and others. Teaching on the emphasis of self monitoring and relapse, the group explored who they can go to for help was brought up as well. Group was encouraged to ask questions in an open forum at the end of group. Positive progress displayed through engagement in topic, Gita had stated she wanted to make a proactive role and participate more in groups. Gita did this and also stated it felt to be a positive interaction being able to express herself.Gita UNIQUE Rendon will continue to engage in psychotherapy to encourage positive self realization.  Treatment Plan Objective 1.1, 1.2, 1.3, 1.4 Therapist: Noam MILLER Ed.

## 2024-07-17 NOTE — PSYCH
Visit Time    Visit Start Time: 0930  Visit Stop Time: 1030  Total Visit Duration: 60 minutes    Subjective:     Patient ID: Gita Rendon is a 38 y.o. female.    [facility] Clinical Progress Notes      Specialized Services Documentation  Therapist must complete separate progress note for each specific clinical activity in which the individual participated during the day.       Group Psychotherapy Life Skills (0930-1030) Gita Rendon actively engaged in group focused on the empowerment triangle which was facilitated virtually in a private office using HIPAA Compliant and Approved Microsoft Teams. Gita consented to the use of tele-video modality of treatment and was virtually present for group psychotherapy today. The group learned about the empowerment triangle and the roles of the creator, , and challenger. The group examined how to shift their mindset from their role(s) in the drama triangle to their role(s) in the Empowerment Triangle. Gita discussed how they would shift their mindset by working on being more social. Participants shared what makes them feel empowered. Gita continues to make progress towards goals through verbal participation in group; to accomplish long term goals continue to utilize skills learned in programming. Continue with psychotherapy to educate and encourage use of wellness tools. Tx Plan Objective: 1.1,1.2 Therapist: Gita Vallejo,ISA, MARKW

## 2024-07-17 NOTE — PSYCH
Virtual Regular Visit    Verification of patient location:    Patient is located at Home in the following state in which I hold an active license [place]      Assessment/Plan:    Problem List Items Addressed This Visit       EILEEN (generalized anxiety disorder) - Primary    Grief    Major depressive disorder, recurrent, severe without psychotic features (HCC)       Goals addressed in session:           Reason for visit is PHP VIRTUAL GROUP DUE TO virtual preference of care      Encounter provider [facility] PARTIAL PSYCH PROGRAM      Recent Visits  No visits were found meeting these conditions.  Showing recent visits within past 7 days and meeting all other requirements  Future Appointments  No visits were found meeting these conditions.  Showing future appointments within next 150 days and meeting all other requirements       The patient was identified by name and date of birth. Gita Rendon was informed that this is a telemedicine visit and that the visit is being conducted throughthe Microsoft Teams platform. She agrees to proceed..  My office door was closed. No one else was in the room.  She acknowledged consent and understanding of privacy and security of the video platform. The patient has agreed to participate and understands they can discontinue the visit at any time.    Patient is aware this is a billable service.     Subjective  Gita Rendon is a 38 y.o. female  .      HPI     Past Medical History:   Diagnosis Date    Allergic     Allergic rhinitis     Anorexia nervosa in remission     Anxiety     Arthritis 5/8/2014    Back pain     Bipolar disorder (HCC)     Depression     Dermatitis 10/16/2021    Disease of thyroid gland     Diverticulitis of colon 9/20/2015    Dizziness     Ear problems     Eating disorder     Esophageal reflux 08/15/2013    GERD (gastroesophageal reflux disease) 8/15/2013    Headache(784.0)     Headache, tension-type     Hypertension     Hypothyroid     Idiopathic intracranial  hypertension     Lumbar degenerative disc disease 05/08/2014    Migraine     Nasal congestion     Nosebleed     Obesity     Obsessive-compulsive disorder     Otitis media     Overactive bladder     Psychiatric disorder     Psychiatric illness     Restless leg syndrome, controlled     Seasonal allergies     Sinusitis     Sleep apnea     Sleep difficulties     Suicide and self-inflicted injury (HCC)     Tinnitus     TMJ dysfunction     Tonsillitis     Transcranial Magnetic Stimulation 09/06/2021    Urinary tract infection     Vision loss        Past Surgical History:   Procedure Laterality Date    DENTAL SURGERY      wisdom teeth-at [age] years. Other surgery dental extraxtion of bone spur (10 years ago)    IR LUMBAR PUNCTURE  02/26/2019    SINUS ENDOSCOPY      SINUS SURGERY      TONSILLECTOMY         Current Outpatient Medications   Medication Sig Dispense Refill    acetaZOLAMIDE (DIAMOX) 250 mg tablet Take 2 tablets (500 mg total) by mouth 2 (two) times a day 120 tablet 2    almotriptan (AXERT) 12.5 MG tablet Take 12.5 mg by mouth once as needed for migraine may repeat in 2 hours if needed.  Limited to 2 x a week      Atogepant (Qulipta) 60 MG TABS Take 60 mg by mouth in the morning      cariprazine (VRAYLAR) 6 MG capsule Take 1 capsule (6 mg total) by mouth daily 30 capsule 0    celecoxib (CeleBREX) 200 mg capsule Take 1 capsule (200 mg total) by mouth 2 (two) times a day 60 capsule 0    Cequa 0.09 % SOLN Apply 1 drop to eye 2 (two) times a day 60 each 0    cetirizine (ZyrTEC) 10 MG chewable tablet Chew 10 mg daily      cholecalciferol 1,000 units tablet Take 5 tablets (5,000 Units total) by mouth daily 150 tablet 0    DULoxetine (CYMBALTA) 60 mg delayed release capsule Take 1 capsule (60 mg total) by mouth daily 30 capsule 1    DULoxetine 40 MG CPEP Take 1 capsule (40 mg total) by mouth daily 30 capsule 0    gabapentin (NEURONTIN) 300 mg capsule Take 1 capsule (300 mg total) by mouth daily after breakfast       gabapentin (NEURONTIN) 600 MG tablet Take 1.5 tablets (900 mg total) by mouth daily at bedtime 45 tablet 0    hydrocortisone (ANUSOL-HC) 2.5 % rectal cream Apply topically 2 (two) times a day 30 g 5    ipratropium (ATROVENT) 0.03 % nasal spray 2 sprays into each nostril every 12 (twelve) hours 30 mL 0    ipratropium (ATROVENT) 0.06 % nasal spray 2 sprays into each nostril every 12 (twelve) hours      levothyroxine 112 mcg tablet Take 1 tablet (112 mcg total) by mouth daily in the early morning 90 tablet 1    linaCLOtide (Linzess) 290 MCG CAPS Take 1 capsule by mouth daily 90 capsule 1    metFORMIN (GLUCOPHAGE) 500 mg tablet Take 1 tablet (500 mg total) by mouth daily with breakfast 90 tablet 1    Mirabegron ER 25 MG TB24 Take 25 mg by mouth daily 30 tablet 5    Multiple Vitamin (MULTIVITAMIN) capsule Take 1 capsule by mouth daily      omeprazole (PriLOSEC) 20 mg delayed release capsule Take 1 capsule (20 mg total) by mouth 2 (two) times a day 180 capsule 1    prochlorperazine (COMPAZINE) 10 mg tablet Take 10 mg by mouth every 6 (six) hours as needed for nausea or vomiting Take 1 tablet by TID if needed for headache or nausea. Limit to 2x a week .      propranolol (INDERAL) 10 mg tablet Take 1 tablet (10 mg total) by mouth in the morning      Semaglutide-Weight Management (WEGOVY) 1.7 MG/0.75ML Inject 0.75 mL (1.7 mg total) under the skin once a week 3 mL 1    spironolactone (ALDACTONE) 25 mg tablet Take 1 tablet (25 mg total) by mouth daily 30 tablet 0    topiramate (TOPAMAX) 100 mg tablet TAKE 1.5 TABLETS BID       No current facility-administered medications for this visit.        Allergies   Allergen Reactions    Doxycycline      Pt refuses d/t remote H/O intracranial hypertension     Other Other (See Comments)     Seasonal allergies       Review of Systems    Video Exam    There were no vitals filed for this visit.    Physical Exam     I spent FOUR GROUP HOURS PLUS CASE MANAGEMENT minutes with patient today in  which greater than 50% of time was spent in counseling/coordination of care regarding PHP - SEE NOTES

## 2024-07-18 ENCOUNTER — TELEMEDICINE (OUTPATIENT)
Dept: PSYCHOLOGY | Facility: CLINIC | Age: 39
End: 2024-07-18
Payer: COMMERCIAL

## 2024-07-18 DIAGNOSIS — F43.21 GRIEF: ICD-10-CM

## 2024-07-18 DIAGNOSIS — F33.2 MAJOR DEPRESSIVE DISORDER, RECURRENT, SEVERE WITHOUT PSYCHOTIC FEATURES (HCC): ICD-10-CM

## 2024-07-18 DIAGNOSIS — F41.1 GAD (GENERALIZED ANXIETY DISORDER): Primary | ICD-10-CM

## 2024-07-18 PROCEDURE — G0177 OPPS/PHP; TRAIN & EDUC SERV: HCPCS

## 2024-07-18 PROCEDURE — G0176 OPPS/PHP;ACTIVITY THERAPY: HCPCS

## 2024-07-18 NOTE — PSYCH
"Visit Time    Visit Start Time: 1215  Visit Stop Time: 1315  Total Visit Duration:  60 minutes    Subjective:     Patient ID: Gita Rendon is a 38 y.o. female.    [facility] Clinical Progress Notes      Specialized Services Documentation  Therapist must complete separate progress note for each specific clinical activity in which the individual participated during the day.     Allied Therapy [id number] This group was facilitated virtually in a private office using HIPAA Compliant and Approved [employer] Teams. Gita Rendon actively participated in music therapy group regarding grief and loss. The group participated in a discussion about their own experiences with grief and loss. The group then participated in a matt discussion on the song \"[employer]\". The group read and discussed handouts regarding the stages of grief and general facts about grief. The group participated in a matt discussion on \"Because of You\". Gita shared that she has intentionally avoided grieving her father and her dogs loss and engaged in discussion on how she can begin to allow herself to grieve. Some effort noted towards treatment goal. Continue AT to encourage the development and proactive use of coping techniques.  Tx Plan Objective: 1.1,1.2,1.4, Therapist:  MAXIMO Chairez MT-BC    Case Management Note    MAXIMO Chairez MT-BC    Current suicide risk : Low     No case management requested or scheduled. Aware of next 1:1    Medications changes/added/denied? No    Treatment session number: 3    Individual Case Management Visit provided today? No    [facility] follow up physician's orders: None at this time.     "

## 2024-07-18 NOTE — PSYCH
"Visit Time    Visit Start Time: 1045  Visit Stop Time: 1145  Total Visit Duration:  60 minutes    Subjective:     Patient ID: Gita Rendon is a 38 y.o. female.    [facility] Clinical Progress Notes      Specialized Services Documentation  Therapist must complete separate progress note for each specific clinical activity in which the individual participated during the day.     Allied Therapy [id number] This group was facilitated virtually in a private office using HIPAA Compliant and Approved [employer] Teams. Gita Rendon participated in music therapy group regarding writing letters to one's past self. The group discussed past events/points of concern in their lives they felt the need to address. The group listened to and discussed the song \"Letter to Me\". The group then discussed and worked on letter to self worksheet. The group then listened to and discussed the song \"Starting Over\", and discussed an area they hoped to grow following writing their letter to self. Gita did not share but appeared attentive throughout. Some effort noted towards treatment goal. Continue AT to encourage healthy self reflection. Tx Plan Objective: 1.1,1.2,1.4, Therapist:  MAXIMO Chairez MTBC    Case Management Note    MAXIMO Chairez MT-BC    Current suicide risk : Low     [id number] Met with Elizabeth for case management. She stated program is going well and she is feeling a little better. She stated that structure and socialization from groups have been most effective so far. She stated she signed up to attend the online DBSA support group starting Saturday. Discussed online grief support groups she could attend and explored the Grief Share website together. She stated that she wanted to return to the [facility] rehab program she previously attended.     Medications changes/added/denied? No    Treatment session number: 4    Individual Case Management Visit provided today? Yes     [facility] follow up " physician's orders: None at this time.

## 2024-07-18 NOTE — PSYCH
Subjective:     Patient ID: Elizabeth Rendon is a 38 y.o. female.     [facility] Clinical Progress Notes      Specialized Services Documentation  Therapist must complete separate progress note for each specific clinical activity in which the individual participated during the day.      Group Psychotherapy - Self Esteem  [id number]   Total Visit Time: 60 minutes    Elizabeth Rendon participated actively in a psychotherapy group focused on self-esteem.  This group was facilitated virtually in a private office using HIPAA compliant and approved [employer] teams. Gita Rendon consented to the use of tele-video modality of treatment.  This writer facilitated a discussion on self esteem with the group members. Group members explored self esteem inflater's and deflater's as a group. Following this, the group viewed a video on self esteem and discussed it. This writer then encouraged every group member to write a list of their ten strengths.  Elizabeth Rendon made good efforts towards progress goals which were displayed through participation and engagement in topic. Elizabeth Rendon identified that she has a low self esteem and has noticed its consequences. Continue to run daily group psychotherapy to meet treatment needs and encourage Elizabeth Rendon to practice skills outside of program.      Tx Plan Objective: 1.1,1.2,1.4 Therapist: ARACELY Pate     EDUCATION THERAPY    8134-6291    Elizabeth Rendon actively shared in morning assessment and goal review. Presented as Receptive related to readiness to learn. Elizabeth Rendon  did complete goal from last treatment day identifying gaining hope and responsibility. Elizabeth Rendon did not present with any barriers to learning. Throughout morning group, Elizabeth Rendon participated in gratitude practice. Elizabeth Rendon made good progress toward goal. Continue education group to assess willingness to engage, assess transfer of knowledge, and participate in skill development.    Tx Plan Objective:  1.1,1.2,1.4, Therapist: ARACELY Pate    GROUP PSYCHOTHERAPY    [id number]    Elizabeth Rendon participated actively in psychotherapy group.  This was observedConsistent throughout the treatment day. They were engaged in learning related to Wellness Tools. Staff utilized Written teaching methods.  Elizabeth Rendon shared area of learning and set a goal for outside of program to make a delivery for her mom.  Elizabeth Rendon was able to share items explored during the day and shared in reflecting on self compassion.  Ended session with staff led exercise of breathing.  Elizabeth Rendon demonstrated good progress toward goal.  Continue group psychotherapy to actively practice learned skills and encourage transfer of knowledge to unstructured time.      Tx Plan Objective: 1.1,1.2,1.4, Therapist: ARACELY Pate

## 2024-07-18 NOTE — PSYCH
Visit Time    Visit Start Time: 0930  Visit Stop Time: 1030  Total Visit Duration:  60 minutes    Subjective:     Patient ID: Gita Rendon is a 38 y.o. female.    [facility] Clinical Progress Notes      Specialized Services Documentation  Therapist must complete separate progress note for each specific clinical activity in which the individual participated during the day.       Group Psychotherapy  This group was facilitated virtually in a private office using HIPAA Compliant and Approved [employer] Teams. Gita Rendon consented to the use of tele-video modality of treatment  Group Psychotherapy    (0930-1030) Gita Rendon attended group on Part II of Wellness Recovery Action Plan. Today, recap of WRAP, toolbox and Daily Maintenance plan completed. Then, members focused on  WRAP's specific sections and encouraged to fill in coping tools from their toolbox for planned responses:   Identification of triggers and stressors  Early warning signs  When things are breaking down and or getting worse    Group members shared pieces of information from these sections and identified their importance. Writer encouraged the members of group to continue utilizing the packet to develop plans inside and outside of program. Good effort towards progress of goals which were displayed through participation and engagement in topic.  Treatment Plan Objectives: 1.1, 1.2  Therapist: Juliet PROCTOR RN

## 2024-07-19 ENCOUNTER — TELEMEDICINE (OUTPATIENT)
Dept: PSYCHOLOGY | Facility: CLINIC | Age: 39
End: 2024-07-19
Payer: COMMERCIAL

## 2024-07-19 DIAGNOSIS — F43.21 GRIEF: ICD-10-CM

## 2024-07-19 DIAGNOSIS — F33.2 MAJOR DEPRESSIVE DISORDER, RECURRENT, SEVERE WITHOUT PSYCHOTIC FEATURES (HCC): ICD-10-CM

## 2024-07-19 DIAGNOSIS — F41.1 GAD (GENERALIZED ANXIETY DISORDER): Primary | ICD-10-CM

## 2024-07-19 PROCEDURE — G0177 OPPS/PHP; TRAIN & EDUC SERV: HCPCS

## 2024-07-19 PROCEDURE — G0176 OPPS/PHP;ACTIVITY THERAPY: HCPCS

## 2024-07-19 PROCEDURE — 90832 PSYTX W PT 30 MINUTES: CPT

## 2024-07-19 NOTE — PSYCH
Visit Time    Visit Start Time: 1215  Visit Stop Time: 1315  Total Visit Duration:  60 minutes    Subjective:     Patient ID: Elizabeth Rendon is a 38 y.o. female.     [facility] Clinical Progress Notes      Specialized Services Documentation  Therapist must complete separate progress note for each specific clinical activity in which the individual participated during the day.      Group Psychotherapy - Chair yoga  Elizabeth Rendon participated actively in a psychotherapy group focused on the benefits of chair yoga.  This writer led a discussion on correlation between mental health and chair yoga. Group members discussed their personal struggles with their wellness and what they would like to add to their daily routines. This writer had group member participate in a 30 minute guided chair yoga routine.  This writer encouraged the group members to partake in group discussion and utilize supplemental materials inside and outside of the program.  Elizabeth Rendon made good efforts towards progress goals which were displayed through participation in the discussion, note taking, and engagement in topic. Elizabeth Rendon shared  she enjoys yoga and would like to engage in yoga more often . Continue to run daily group psychotherapy to meet treatment needs and encourage Elizabeth Rendon to practice skills outside of program.    Tx Plan Objective: 1.1,1.2,1.4 Therapist: Ashley Costenbader MA LMT      This group was facilitated virtually in a private office using HIPAA Compliant and Approved [employer] Teams.  Gita Rendon consented to the use of tele-video modality of treatment.

## 2024-07-19 NOTE — PSYCH
Virtual Regular Visit    Verification of patient location:    Patient is located at Home in the following state in which I hold an active license [place]      Assessment/Plan:    Problem List Items Addressed This Visit          Behavioral Health    EILEEN (generalized anxiety disorder) - Primary    Grief    Major depressive disorder, recurrent, severe without psychotic features (HCC)       Goals addressed in session:           Reason for visit is PHP VIRTUAL GROUP DUE TO VIRTUAL PREFERENCE      Encounter provider  PARTIAL PSYCH PROGRAM      Recent Visits  No visits were found meeting these conditions.  Showing recent visits within past 7 days and meeting all other requirements  Future Appointments  No visits were found meeting these conditions.  Showing future appointments within next 150 days and meeting all other requirements       The patient was identified by name and date of birth. Gita Rendon was informed that this is a telemedicine visit and that the visit is being conducted throughthe [employer] Teams platform. She agrees to proceed..  My office door was closed. No one else was in the room.  She acknowledged consent and understanding of privacy and security of the video platform. The patient has agreed to participate and understands they can discontinue the visit at any time.    Patient is aware this is a billable service.     Subjective  Gita Rendon is a 38 y.o. female .      HPI     Past Medical History:   Diagnosis Date    Allergic     Allergic rhinitis     Anorexia nervosa in remission     Anxiety     Arthritis 5/8/2014    Back pain     Bipolar disorder (HCC)     Depression     Dermatitis 10/16/2021    Disease of thyroid gland     Diverticulitis of colon 9/20/2015    Dizziness     Ear problems     Eating disorder     Esophageal reflux 08/15/2013    GERD (gastroesophageal reflux disease) 8/15/2013    Headache(784.0)     Headache, tension-type     Hypertension     Hypothyroid     Idiopathic  intracranial hypertension     Lumbar degenerative disc disease 05/08/2014    Migraine     Nasal congestion     Nosebleed     Obesity     Obsessive-compulsive disorder     Otitis media     Overactive bladder     Psychiatric disorder     Psychiatric illness     Restless leg syndrome, controlled     Seasonal allergies     Sinusitis     Sleep apnea     Sleep difficulties     Suicide and self-inflicted injury (HCC)     Tinnitus     TMJ dysfunction     Tonsillitis     Transcranial Magnetic Stimulation 09/06/2021    Urinary tract infection     Vision loss        Past Surgical History:   Procedure Laterality Date    DENTAL SURGERY      wisdom teeth-at [age] years. Other surgery dental extraxtion of bone spur (10 years ago)    IR LUMBAR PUNCTURE  02/26/2019    SINUS ENDOSCOPY      SINUS SURGERY      TONSILLECTOMY         Current Outpatient Medications   Medication Sig Dispense Refill    acetaZOLAMIDE (DIAMOX) 250 mg tablet Take 2 tablets (500 mg total) by mouth 2 (two) times a day 120 tablet 2    almotriptan (AXERT) 12.5 MG tablet Take 12.5 mg by mouth once as needed for migraine may repeat in 2 hours if needed.  Limited to 2 x a week      Atogepant (Qulipta) 60 MG TABS Take 60 mg by mouth in the morning      cariprazine (VRAYLAR) 6 MG capsule Take 1 capsule (6 mg total) by mouth daily 30 capsule 0    celecoxib (CeleBREX) 200 mg capsule Take 1 capsule (200 mg total) by mouth 2 (two) times a day 60 capsule 0    Cequa 0.09 % SOLN Apply 1 drop to eye 2 (two) times a day 60 each 0    cetirizine (ZyrTEC) 10 MG chewable tablet Chew 10 mg daily      cholecalciferol 1,000 units tablet Take 5 tablets (5,000 Units total) by mouth daily 150 tablet 0    DULoxetine (CYMBALTA) 60 mg delayed release capsule Take 1 capsule (60 mg total) by mouth daily 30 capsule 1    DULoxetine 40 MG CPEP Take 1 capsule (40 mg total) by mouth daily 30 capsule 0    gabapentin (NEURONTIN) 300 mg capsule Take 1 capsule (300 mg total) by mouth daily after  breakfast      gabapentin (NEURONTIN) 600 MG tablet Take 1.5 tablets (900 mg total) by mouth daily at bedtime 45 tablet 0    hydrocortisone (ANUSOL-HC) 2.5 % rectal cream Apply topically 2 (two) times a day 30 g 5    ipratropium (ATROVENT) 0.03 % nasal spray 2 sprays into each nostril every 12 (twelve) hours 30 mL 0    ipratropium (ATROVENT) 0.06 % nasal spray 2 sprays into each nostril every 12 (twelve) hours      levothyroxine 112 mcg tablet Take 1 tablet (112 mcg total) by mouth daily in the early morning 90 tablet 1    linaCLOtide (Linzess) 290 MCG CAPS Take 1 capsule by mouth daily 90 capsule 1    metFORMIN (GLUCOPHAGE) 500 mg tablet Take 1 tablet (500 mg total) by mouth daily with breakfast 90 tablet 1    Mirabegron ER 25 MG TB24 Take 25 mg by mouth daily 30 tablet 5    Multiple Vitamin (MULTIVITAMIN) capsule Take 1 capsule by mouth daily      omeprazole (PriLOSEC) 20 mg delayed release capsule Take 1 capsule (20 mg total) by mouth 2 (two) times a day 180 capsule 1    prochlorperazine (COMPAZINE) 10 mg tablet Take 10 mg by mouth every 6 (six) hours as needed for nausea or vomiting Take 1 tablet by TID if needed for headache or nausea. Limit to 2x a week .      propranolol (INDERAL) 10 mg tablet Take 1 tablet (10 mg total) by mouth in the morning      Semaglutide-Weight Management (WEGOVY) 1.7 MG/0.75ML Inject 0.75 mL (1.7 mg total) under the skin once a week 3 mL 1    spironolactone (ALDACTONE) 25 mg tablet Take 1 tablet (25 mg total) by mouth daily 30 tablet 0    topiramate (TOPAMAX) 100 mg tablet TAKE 1.5 TABLETS BID       No current facility-administered medications for this visit.        Allergies   Allergen Reactions    Doxycycline      Pt refuses d/t remote H/O intracranial hypertension     Other Other (See Comments)     Seasonal allergies       Review of Systems    Video Exam    There were no vitals filed for this visit.    Physical Exam     Visit Time    I spent FOUR GROUP HOURS PLUS CASE MANAGEMENT  minutes with patient today in which greater than 50% of the time was spent in counseling/coordination of care regarding PHP - SEE NOTES.

## 2024-07-19 NOTE — PSYCH
Visit Time    Visit Start Time: 1330  Visit Stop Time: 1430  Total Visit Duration:  50 minutes    Subjective:     Patient ID: Gita Rendon is a 38 y.o. female.    [facility] Clinical Progress Notes      Specialized Services Documentation  Therapist must complete separate progress note for each specific clinical activity in which the individual participated during the day.     GROUP PSYCHOTHERAPY    5865-9160    Gita Rendon participated actively in psychotherapy group.  This was observed Consistent throughout the treatment day. They were engaged in learning related to Wellness Tools. Staff utilized Verbal, A/V, and Demonstration teaching methods.  Gita Rendon shared area of learning and set a goal for outside of program to go out of apartment twice this weekend to visit mom and go for a walk in park.  Gita Rendon was able to share items explored during the day and shared in adding to their WRAP.  Ended session with staff led exercise Qi-Gong.  Gita Rendon demonstrated Some progress toward goal.  Continue group psychotherapy to actively practice learned skills and encourage transfer of knowledge to unstructured time.    Tx Plan Objective: 1.1 1.2, Therapist: Juliet Zamorano, RN, BSN

## 2024-07-19 NOTE — PSYCH
Subjective:     Patient ID: Elizabeth Rendon is a 38 y.o. female.     [facility] Clinical Progress Notes      Specialized Services Documentation  Therapist must complete separate progress note for each specific clinical activity in which the individual participated during the day.      Group Psychotherapy - Wellness Assessment    4145-1618   Total Visit Duration: 60 minutes    Elizabeth Rendon participated actively in a psychotherapy group focused on  the Wellness Assessment.  This group was facilitated virtually in a private office using HIPAA compliant and approved [employer] teams. Gita Rendon consented to the use of tele-video modality of treatment.  The group engaged in the wellness assessment, which evaluates progress on several different areas of wellness/wellbeing: physical, emotional, cognitive, vocational, social and spiritual. Clients rated their progress and discussed areas that need work. By completing and discussing areas of progress, goals and challenges, members are connected and reminded that, in their mental health struggle, they are not alone. Topics of discussion revolved around setting goals, coping with change, methods to achieve goals. Members also engaged in a mindfulness nature exercise. Elizabeth Rendon continues to make progress towards goals through participation in group activity and personal disclosures.  Elizabeth Rendon shared she wants to work on being socially2 active. Continue to run daily group psychotherapy to meet treatment needs and encourage Elizabeth Rendon to practice skills outside of program.      Tx Plan Objective: 1.1,1.2,1.4  Therapist: ARACELY Pate      EDUCATION THERAPY    4036-4014  Total Visit Duration: 60 minutes    Elizabeth Rendon actively shared in morning assessment and goal review. Presented as Receptive related to readiness to learn. Elizabeth Rendon  did complete goal from last treatment day identifying gaining responsibility and support. Elizabeth Rendon did not present  with any barriers to learning. Throughout morning group, Elizabeth Rendon participated in movement experience. Elizabeth Rendon made good progress toward goal. Continue education group to assess willingness to engage, assess transfer of knowledge, and participate in skill development.    Tx Plan Objective: 1.1,1.2,1.4, Therapist: ARACELY Pate    GROUP PSYCHOTHERAPY

## 2024-07-20 DIAGNOSIS — J34.89 RHINORRHEA: ICD-10-CM

## 2024-07-22 ENCOUNTER — DOCUMENTATION (OUTPATIENT)
Dept: PSYCHOLOGY | Facility: CLINIC | Age: 39
End: 2024-07-22

## 2024-07-22 ENCOUNTER — APPOINTMENT (OUTPATIENT)
Dept: PSYCHOLOGY | Facility: CLINIC | Age: 39
End: 2024-07-22
Payer: COMMERCIAL

## 2024-07-22 RX ORDER — IPRATROPIUM BROMIDE 21 UG/1
2 SPRAY, METERED NASAL EVERY 12 HOURS
Qty: 30 ML | Refills: 1 | Status: SHIPPED | OUTPATIENT
Start: 2024-07-22

## 2024-07-22 NOTE — PSYCH
EDUCATION THERAPY    2107-3688    Gita Rendon {SL AMB PSYCH ACTIVELY:00457} shared in morning assessment and goal review. Presented as {Readiness to Learnin} related to readiness to learn. Gita Rendon {DID/DID NOT:85559} present with any barriers to learning. Throughout morning group, Gita Rendon participated in {morning assessmemt experiences:75229}. Gita Rendon made *** progress toward goal. Continue education group to assess willingness to engage, assess transfer of knowledge, and participate in skill development.    Treatment Plan Objective 1.1, 1.2, 1.3, 1.4 , Therapist: Noam Sandhu M.Ed.    GROUP PSYCHOTHERAPY    5822-0202    Gita Rendon participated {SL AMB PSYCH ACTIVELY:27467} in psychotherapy group.  This was observed{consistent/inconsistent pain:6805698527} throughout the treatment day. They were engaged in learning related to {Education Completed:28949}. Staff utilized {Teaching Method:43747} teaching methods.  Gita Rendon shared area of learning and set a goal for outside of program to ***.  Gita Rendon was able to share items explored during the day and shared in adding to their WRAP.  Ended session with {staff/peer led:62834} led exercise ***.  Gita Rendon demonstrated *** progress toward goal.  Continue group psychotherapy to actively practice learned skills and encourage transfer of knowledge to unstructured time.    Treatment Plan Objective 1.1, 1.2, 1.3, 1.4 , Therapist: Noam Sandhu M.Ed    Group Psychotherapy    This group was facilitated virtually in a private office using HIPAA Compliant and Approved [employer] Teams.  ([id number])Gita Rendon actively engaged in psychoeducational group about self affirmations. The skill helps to maintain and regulate positive self affirmations and positive self image. Group discovered strategies and statements that help them to manage positive well being on a short term and long term basis. The  group talked about understanding the purpose, and meaning of self affirmation in intellectual and emotional expression, regulation , and recognition, and how it affects themselves and others. Teaching on the emphasis of positive self affirmation and self-care, who the group can go to for help was brought up as well. Group was encouraged to ask questions in an open forum at the end of group. *** progress displayed through engagement in topic. Gita Rendon will continue to engage in psychotherapy to encourage positive self realization.  Treatment Plan Objective 1.1, 1.2, 1.3, 1.4 Therapist: Noam MILLER Ed.

## 2024-07-22 NOTE — PROGRESS NOTES
Subjective:     Patient ID: Gita Rendon is a 38 y.o. female.    [facility] Clinical Progress Notes      Specialized Services Documentation  Therapist must complete separate progress note for each specific clinical activity in which the individual participated during the day.     Case Management Note    MAXIMO Chairez, [facility]    Current suicide risk : Low     Gita called off of program today due to having a migraine. Excused absence.     Medications changes/added/denied? No    Treatment session number: n/a    Individual Case Management Visit provided today? No    [facility] follow up physician's orders: NONE AT THIS TIME.

## 2024-07-23 ENCOUNTER — APPOINTMENT (OUTPATIENT)
Dept: PSYCHOLOGY | Facility: CLINIC | Age: 39
End: 2024-07-23
Payer: COMMERCIAL

## 2024-07-23 ENCOUNTER — DOCUMENTATION (OUTPATIENT)
Dept: PSYCHOLOGY | Facility: CLINIC | Age: 39
End: 2024-07-23

## 2024-07-23 NOTE — PROGRESS NOTES
Subjective:     Patient ID: Gita Rendon is a 38 y.o. female.    [facility] Clinical Progress Notes      Specialized Services Documentation  Therapist must complete separate progress note for each specific clinical activity in which the individual participated during the day.     Other 7804- This writer left a voicemail for Elizabeth and left her the crisis information, the national suicide prevention hotline,peer line, and text line. Left the phone number to call back if needed.      MELISSA Matute

## 2024-07-23 NOTE — PROGRESS NOTES
Subjective:     Patient ID: Gita Rendon is a 38 y.o. female.    [facility] Clinical Progress Notes      Specialized Services Documentation  Therapist must complete separate progress note for each specific clinical activity in which the individual participated during the day.     Case Management Note    MAXIMO Chairez, [facility]    Current suicide risk : Low     [id number] Received phone call from Elizabeth. She stated she was having a difficult day mentally and was struggling. She stated she felt safe and did not need to go to the hospital. This writer walked her through coping skills and set a plan for the remainder of the day so she didn't need to worry or feel overwhelmed. She confirmed and was able to relay crisis, peer/warm lines, and identify an emergency room if she felt unsafe. Another  to check in with Elizabeth before the end of the day.

## 2024-07-23 NOTE — PROGRESS NOTES
Subjective:     Patient ID: Gita Rendon is a 38 y.o. female.    [facility] Clinical Progress Notes      Specialized Services Documentation  Therapist must complete separate progress note for each specific clinical activity in which the individual participated during the day.     Case Management Note    MAXIMO Chairez, [facility]    Current suicide risk : Low     Elizabeth called out today stating she had a migraine. This writer attempted to contact her but received voice mailbox. Left message requesting callback.     Medications changes/added/denied? No    Treatment session number: n/a    Individual Case Management Visit provided today? No    [facility] follow up physician's orders: None at this time.

## 2024-07-23 NOTE — PSYCH
Visit Time    Visit Start Time: 1045  Visit Stop Time: 1145  Total Visit Duration: *** minutes    Subjective:     Patient ID: Gita Rendon is a 38 y.o. female.    [facility] Clinical Progress Notes      Specialized Services Documentation  Therapist must complete separate progress note for each specific clinical activity in which the individual participated during the day.     Allied Therapy [id number] This group was facilitated virtually in a private office using HIPAA Compliant and Approved [employer] Teams. Gita Rendon actively participated in music therapy group regarding self-validation. The group participated in a matt discussion on the song “Hated”. Group members were asked to share examples of times where they felt invalidated by others. The group then read and discussed the handout on self validation and related the content to their experience. The group participated in a matt rewrite of the song “I am Light”, identifying affirmations for self-validation. Gita shared *** as an affirmation of self-validation to use outside of program. *** effort noted towards treatment goal. Continue AT to encourage the development and proactive use of self-validating techniques.  Tx Plan Objective: 1.1,1.2,1.4, Therapist:  MAXIMO Chairez, MT-BC    Case Management Note    MAXIMO Chairez MT-BC    Current suicide risk : Low     ***    Medications changes/added/denied? No    Treatment session number: 5    Individual Case Management Visit provided today? {YES (DEF)/NO:05398}    [facility] follow up physician's orders: None at this time.

## 2024-07-24 ENCOUNTER — TELEMEDICINE (OUTPATIENT)
Dept: PSYCHOLOGY | Facility: CLINIC | Age: 39
End: 2024-07-24
Payer: COMMERCIAL

## 2024-07-24 DIAGNOSIS — F33.2 MAJOR DEPRESSIVE DISORDER, RECURRENT, SEVERE WITHOUT PSYCHOTIC FEATURES (HCC): Primary | ICD-10-CM

## 2024-07-24 DIAGNOSIS — F41.1 GAD (GENERALIZED ANXIETY DISORDER): ICD-10-CM

## 2024-07-24 DIAGNOSIS — F43.21 GRIEF: ICD-10-CM

## 2024-07-24 PROCEDURE — G0410 GRP PSYCH PARTIAL HOSP 45-50: HCPCS

## 2024-07-24 PROCEDURE — G0176 OPPS/PHP;ACTIVITY THERAPY: HCPCS

## 2024-07-24 PROCEDURE — G0177 OPPS/PHP; TRAIN & EDUC SERV: HCPCS

## 2024-07-24 NOTE — PSYCH
Virtual Regular Visit    Verification of patient location:    Patient is located at Home in the following state in which I hold an active license [place]      Assessment/Plan:    Problem List Items Addressed This Visit       EILEEN (generalized anxiety disorder)    Grief    Major depressive disorder, recurrent, severe without psychotic features (HCC) - Primary       Goals addressed in session:           Reason for visit is PHP VIRTUAL GROUP DUE TO virtual preference of care      Encounter provider [facility] PARTIAL PSYCH PROGRAM      Recent Visits  No visits were found meeting these conditions.  Showing recent visits within past 7 days and meeting all other requirements  Future Appointments  No visits were found meeting these conditions.  Showing future appointments within next 150 days and meeting all other requirements       The patient was identified by name and date of birth. Gita Rendon was informed that this is a telemedicine visit and that the visit is being conducted throughthe [employer] Teams platform. She agrees to proceed..  My office door was closed. No one else was in the room.  She acknowledged consent and understanding of privacy and security of the video platform. The patient has agreed to participate and understands they can discontinue the visit at any time.    Patient is aware this is a billable service.     Subjective  Gita Rendon is a 38 y.o. female  .      HPI     Past Medical History:   Diagnosis Date    Allergic     Allergic rhinitis     Anorexia nervosa in remission     Anxiety     Arthritis 5/8/2014    Back pain     Bipolar disorder (HCC)     Depression     Dermatitis 10/16/2021    Disease of thyroid gland     Diverticulitis of colon 9/20/2015    Dizziness     Ear problems     Eating disorder     Esophageal reflux 08/15/2013    GERD (gastroesophageal reflux disease) 8/15/2013    Headache(784.0)     Headache, tension-type     Hypertension     Hypothyroid     Idiopathic intracranial  hypertension     Lumbar degenerative disc disease 05/08/2014    Migraine     Nasal congestion     Nosebleed     Obesity     Obsessive-compulsive disorder     Otitis media     Overactive bladder     Psychiatric disorder     Psychiatric illness     Restless leg syndrome, controlled     Seasonal allergies     Sinusitis     Sleep apnea     Sleep difficulties     Suicide and self-inflicted injury (HCC)     Tinnitus     TMJ dysfunction     Tonsillitis     Transcranial Magnetic Stimulation 09/06/2021    Urinary tract infection     Vision loss        Past Surgical History:   Procedure Laterality Date    DENTAL SURGERY      wisdom teeth-at [age] years. Other surgery dental extraxtion of bone spur (10 years ago)    IR LUMBAR PUNCTURE  02/26/2019    SINUS ENDOSCOPY      SINUS SURGERY      TONSILLECTOMY         Current Outpatient Medications   Medication Sig Dispense Refill    acetaZOLAMIDE (DIAMOX) 250 mg tablet Take 2 tablets (500 mg total) by mouth 2 (two) times a day 120 tablet 2    almotriptan (AXERT) 12.5 MG tablet Take 12.5 mg by mouth once as needed for migraine may repeat in 2 hours if needed.  Limited to 2 x a week      Atogepant (Qulipta) 60 MG TABS Take 60 mg by mouth in the morning      cariprazine (VRAYLAR) 6 MG capsule Take 1 capsule (6 mg total) by mouth daily 30 capsule 0    celecoxib (CeleBREX) 200 mg capsule Take 1 capsule (200 mg total) by mouth 2 (two) times a day 60 capsule 0    Cequa 0.09 % SOLN Apply 1 drop to eye 2 (two) times a day 60 each 0    cetirizine (ZyrTEC) 10 MG chewable tablet Chew 10 mg daily      cholecalciferol 1,000 units tablet Take 5 tablets (5,000 Units total) by mouth daily 150 tablet 0    DULoxetine (CYMBALTA) 60 mg delayed release capsule Take 1 capsule (60 mg total) by mouth daily 30 capsule 1    DULoxetine 40 MG CPEP Take 1 capsule (40 mg total) by mouth daily 30 capsule 0    gabapentin (NEURONTIN) 300 mg capsule Take 1 capsule (300 mg total) by mouth daily after breakfast       gabapentin (NEURONTIN) 600 MG tablet Take 1.5 tablets (900 mg total) by mouth daily at bedtime 45 tablet 0    hydrocortisone (ANUSOL-HC) 2.5 % rectal cream Apply topically 2 (two) times a day 30 g 5    ipratropium (ATROVENT) 0.03 % nasal spray 2 sprays into each nostril every 12 (twelve) hours 30 mL 1    levothyroxine 112 mcg tablet Take 1 tablet (112 mcg total) by mouth daily in the early morning 90 tablet 1    linaCLOtide (Linzess) 290 MCG CAPS Take 1 capsule by mouth daily 90 capsule 1    metFORMIN (GLUCOPHAGE) 500 mg tablet Take 1 tablet (500 mg total) by mouth daily with breakfast 90 tablet 1    Mirabegron ER 25 MG TB24 Take 25 mg by mouth daily 30 tablet 5    Multiple Vitamin (MULTIVITAMIN) capsule Take 1 capsule by mouth daily      omeprazole (PriLOSEC) 20 mg delayed release capsule Take 1 capsule (20 mg total) by mouth 2 (two) times a day 180 capsule 1    prochlorperazine (COMPAZINE) 10 mg tablet Take 10 mg by mouth every 6 (six) hours as needed for nausea or vomiting Take 1 tablet by TID if needed for headache or nausea. Limit to 2x a week .      propranolol (INDERAL) 10 mg tablet Take 1 tablet (10 mg total) by mouth in the morning      Semaglutide-Weight Management (WEGOVY) 1.7 MG/0.75ML Inject 0.75 mL (1.7 mg total) under the skin once a week 3 mL 1    spironolactone (ALDACTONE) 25 mg tablet Take 1 tablet (25 mg total) by mouth daily 30 tablet 0    topiramate (TOPAMAX) 100 mg tablet TAKE 1.5 TABLETS BID       No current facility-administered medications for this visit.        Allergies   Allergen Reactions    Doxycycline      Pt refuses d/t remote H/O intracranial hypertension     Other Other (See Comments)     Seasonal allergies       Review of Systems    Video Exam    There were no vitals filed for this visit.    Physical Exam     I spent FOUR GROUP HOURS PLUS CASE MANAGEMENT minutes with patient today in which greater than 50% of time was spent in counseling/coordination of care regarding PHP - SEE  NOTES

## 2024-07-24 NOTE — PSYCH
EDUCATION THERAPY    [id number]    Gita Rendon actively shared in morning assessment and goal review. Presented as Receptive related to readiness to learn. Gita Rendon did not present with any barriers to learning. Throughout morning group, Gita Rendon participated in mindfulness exercise. Gita Rendon made positive progress toward goal, Gita was able to go outside of her house and do shopping, gaining responsibility and hope. Continue education group to assess willingness to engage, assess transfer of knowledge, and participate in skill development.    Treatment Plan Objective 1.1, 1.2, 1.3, 1.4 , Therapist: Noam Sandhu M.Ed.    GROUP PSYCHOTHERAPY    [id number]    Gita Rendon participated actively in psychotherapy group.  This was observedConsistent throughout the treatment day. They were engaged in learning related to Wellness Tools. Staff utilized Verbal teaching methods.  Gita Rendon shared area of learning and set a goal for outside of program to go out and do errands.  Gita Rendon was able to share items explored during the day and shared in adding to their WRAP.  Ended session with staff led exercise on mindfulness.  Gita Rendon demonstrated positive progress toward goal.  Continue group psychotherapy to actively practice learned skills and encourage transfer of knowledge to unstructured time.    Treatment Plan Objective 1.1, 1.2, 1.3, 1.4 , Therapist: Noam Sandhu M.Ed    Group Psychotherapy      Group Therapy    Subjective:     Patient ID: Gita Rendon 38 y.o.     This group was facilitated virtually in a private office using HIPAA Compliant and Approved "[employer] Teams Gita Rendon consented to the use of tele-video modality of treatment.  ([id number])Gita Rendon actively engaged in psychoeducational group about radical acceptance. The skill helps an individual to learn to accept situations that are out of ones control. Reducing denial  "of situations and reducing distress..Group discovered strategies that help them to manage emotional well being on a short term and long term basis. The introduction of the concept of \"wise mid\" is used in this process.The group talked about understanding the purpose and meaning radical acceptance with  \"wise mind\" ,regulation , recognition, and how it affects themselves and others..Teaching on the emphasis of radical acceptance, who the group can go to for help was brought up as well. Group was encouraged to ask questions in an open forum at the end of group. Adequate progress displayed through engagement in topic, Gita did attempt the stahl mind meditation, but felt that she needs more practice in becoming more familiar and comfortable with the technique. Gita Rendon will continue to engage in psychotherapy to encourage positive self realization.  Treatment Plan Objective 1.1, 1.2, 1.3, 1.4 Therapist: Noam MILLER Ed.   "

## 2024-07-24 NOTE — PSYCH
Subjective:     Patient ID: Elizabeth Rendon is a 38 y.o. female.     [facility] Clinical Progress Notes      Specialized Services Documentation  Therapist must complete separate progress note for each specific clinical activity in which the individual participated during the day.      Group Psychotherapy - Cognitive Distortions     [id number]  Elizabeth Rendon participated quietly in a psychotherapy group focused on cognitive distortions.  Members reviewed and discussed the 15 types of cognitive distortions. This writer facilitated a discussion on distortion triggers, the associated experience of emotions, and which distortions resonated with each member. Members evaluated their cognitive distortions and which they use most frequently. Members were provided worksheets to begin challenging their cognitive distortions outside of program. This writer encouraged members to actively seek out and challenge distorted thoughts using these handouts. Elizabeth Rendon made some efforts towards progress goals which were displayed through participation, notetaking, and engagement in topic. Elizabeth Rendon identified that she would like to work on disqualifying the positives distortion. Continue to run daily group psychotherapy to meet treatment needs and encourage Elizabeth Rendon to practice skills outside of program.      Tx Plan Objective: 1.1,1.2,1.4 Therapist: ARACELY Pate

## 2024-07-24 NOTE — PSYCH
"Visit Time    Visit Start Time: 1045  Visit Stop Time: 1145  Total Visit Duration: 1 hour    Subjective:     Patient ID: Gita Rendon is a 38 y.o. y.o. female.    [facility] Clinical Progress Notes      Specialized Services Documentation  Therapist must complete separate progress note for each specific clinical activity in which the individual participated during the day.     Allied Therapy Group   Gita Rendon actively shared in Music Therapy Group exploring DBT distress tolerance “crisis survival strategies”.  Group explored crisis survival strategies “ACCEPTS, TIP, and STOP\" reinforcing actions one could take to learn to tolerate stressful experiences, thoughts and urges.  \"Elizabeth\" participated in STOP examples, PMR, and distracting with others thoughts/activites to explore several strategies.  She felt she could put effort into practicing ACCEPTS and found value in the variety of strategies.  Some progress toward goal noted.  Continue AT group to encourage learning, practice, and home practice of skills to manage distress.   Tx Plan Objective: 1.1, Therapist:  Fernanda ISRAEL    "

## 2024-07-24 NOTE — PSYCH
"Subjective:     Patient ID: Gita Rendon is a 38 y.o. female.    [facility] Clinical Progress Notes      Specialized Services Documentation  Therapist must complete separate progress note for each specific clinical activity in which the individual participated during the day.     Visit Time    Visit Start Time: 1436  Visit Stop Time: 1446  Total Visit Duration:  10 minutes      Case Management Note    Current suicide risk : Low     Medications changes/added/denied? No    Treatment session number: 5    Individual Case Management Visit provided today? Yes     [facility] follow up physician's orders: none at this time        INDIVIDUAL PSYCHOTHERAPY     Gita Rendon actively shared in individual therapy.   Gita Rendon identified that she having been \" worrying about everything and everything.\" Elizabeth identified that she is having cognitive distortions of catastrophic and fortune telling .  This writer utilized problem solving interventions. Encouraged Elizabeth to use worry time. Elizabeth will write a list of things that are bothering her the most for homework . Next session follow up to include Follow up on the list of things that are bothering her .Elizabeth reports that her ICM and her have discussed residential treatment. Elizabeth informed this writer that Next Thursday she needs to leave at 1:30pm for weight management appointment and Next Friday she needs to leave at 1245 and will attend group after her virtual appointment concludes with [facility]. Continue individual psychotherapy in order to decrease symptoms . Meeting requested for tomorrow via teams.    Treatment Plan Objectives addressed: 1.1, 1.2, 1.3, 1.4       ISA Matute, MELISSA       "

## 2024-07-25 ENCOUNTER — TELEMEDICINE (OUTPATIENT)
Dept: PSYCHOLOGY | Facility: CLINIC | Age: 39
End: 2024-07-25
Payer: COMMERCIAL

## 2024-07-25 DIAGNOSIS — F43.21 GRIEF: ICD-10-CM

## 2024-07-25 DIAGNOSIS — F41.1 GAD (GENERALIZED ANXIETY DISORDER): ICD-10-CM

## 2024-07-25 DIAGNOSIS — F33.2 MAJOR DEPRESSIVE DISORDER, RECURRENT, SEVERE WITHOUT PSYCHOTIC FEATURES (HCC): Primary | ICD-10-CM

## 2024-07-25 PROCEDURE — G0410 GRP PSYCH PARTIAL HOSP 45-50: HCPCS

## 2024-07-25 PROCEDURE — G0177 OPPS/PHP; TRAIN & EDUC SERV: HCPCS

## 2024-07-25 PROCEDURE — 90834 PSYTX W PT 45 MINUTES: CPT

## 2024-07-25 NOTE — PSYCH
Subjective:     Patient ID: Gita Rendon is a 38 y.o. female.    [facility] Clinical Progress Notes      Specialized Services Documentation  Therapist must complete separate progress note for each specific clinical activity in which the individual participated during the day.     Visit Time    Visit Start Time: 1217  Visit Stop Time: 1257  Total Visit Duration: 40 minutes    Case Management Note    Current suicide risk : Low     Medications changes/added/denied? No    Treatment session number: 6    Individual Case Management Visit provided today? Yes     [facility] follow up physician's orders: none at this time        INDIVIDUAL PSYCHOTHERAPY     Gita Rendon actively shared in individual therapy. Elizabeth shared about her evening last night. She shared about her immediate family history. Elizabeth shared that the closest grief share group is [place]min[place] Away and she unsure if she wants to attend. Discussed virtual options. Elizabeth reports that she attended the [facility] [employer] virtually this past weekend. Discussed how support groups and volunteering would help her decrease isolation.Elizabeth shared that she has thought about volunteering at the [employer]. Reviewed the homework this writer assigned to her yesterday.  Gita Rendon identified that her living situation is bothering her the most followed by not having money to afford paying for repairs in the future and being afraid that she may not get her weight loss medication due to shortage. Discussed how she could work on the living situation. Updated treatment plan for Elizabeth to explore residential treatment to see if this is the best option for her since it was recommend by her ICM. This writer utilized problem solving and supportive interventions. Good  progress toward goal identified. Next session follow up to include follow up on what she has learned about residential treatment. Continue individual psychotherapy in order to progress towards  goals. No med concerns. Informed of med check next week.     Treatment Plan Objectives addressed: 1.1, 1.2, 1.3, 1.4       ISA Matute, LSW

## 2024-07-25 NOTE — PSYCH
Visit Time    Visit Start Time: 1045  Visit Stop Time: 1145  Total Visit Duration:  60 minutes  Subjective:     Patient ID: Elizabeth Rendon is a 38 y.o. female.     [facility] Clinical Progress Notes      Specialized Services Documentation  Therapist must complete separate progress note for each specific clinical activity in which the individual participated during the day.      Group Psychotherapy - Emotions VS Feeling  Elizabeth Rendon participated with prompts in a psychotherapy group focused on emotions vs feelings.  This writer led a discussion on identifying the difference between emotions and feelings. Group members discussed their understanding of emotions vs feelings. This writer engaged members to choose to opposite emotions and draw what the emotions feels like to them At the conclusion each member of the reflected on what emotion they member. This writer encouraged the group members to partake in group discussion and utilize supplemental materials inside and outside of the program.  Elizabeth Rendon made good efforts towards progress goals which were displayed through participation in the discussion, note taking, and engagement in topic. . Continue to run daily group psychotherapy to meet treatment needs and encourage Elizabeth Rendon to practice skills outside of program.    Tx Plan Objective: 1.1,1.2,1.4 Therapist: Ashley Costenbader MA LMT

## 2024-07-25 NOTE — PSYCH
Subjective:     Patient ID: Elizabeth Rendon is a 38 y.o. female.     [facility] Clinical Progress Notes      Specialized Services Documentation  Therapist must complete separate progress note for each specific clinical activity in which the individual participated during the day.     Group Psychotherapy - Boundaries    0930-1030 Elizabeth Rendon participated with prompts in a psychotherapy group focused on Boundaries. This group was facilitated virtually in a private office using HIPAA compliant and approved [employer] teams. Gita Rendon consented to the use of tele-video modality of treatment.  Today group members focused on Boundaries and identified ways they struggle with setting healthy boundaries. The goal of today's group was for members to evaluate their healthy and unhealthy boundaries and identify techniques they could use to practice setting boundaries. This writer facilitated group discussion on these questions relating to boundaries:    What challenges can you encounter when setting boundaries?  Members participated in group through involvement in group discussion and notetaking on topics such as:    awareness of boundaries               How to set them  [place] violations    Members evaluated their boundaries and acknowledged their struggle to set appropriate boundaries. This writer encouraged members to participate and integrate coping and reviewed techniques when setting boundaries. Elizabeth Rendon made some efforts towards progress goals which were displayed through participation, notetaking, and engagement in topic.  Elizabeth Rendon identified that she struggles with boundaries.  Elizabeth participated when prompted. Continue to run daily group psychotherapy to meet treatment needs and encourage Elizabeth Rendon to practice skills outside of program.      Tx Plan Objective: 1.1,1.2,1.4 Therapist: ARACELY Pate

## 2024-07-25 NOTE — BH TREATMENT PLAN
"Assessment/Plan:         Assessment  Diagnoses and all orders for this visit:     Major depressive disorder, recurrent, severe without psychotic features (HCC)     EILEEN (generalized anxiety disorder)              Subjective:        Subjective  Patient ID: Gita Rendon is a 38 y.o. female.     [facility] Treatment Plan   AREAS OF NEED: depression and anxiety as evidenced by poor appetite, loss of interests, poor motivation, isolation, crying \"all the time\", excess sleep, anxious thoughts, worrying about everything, low mood. Stressors of Dad's passing in 2021, mother's declining health (mental and physical), stepfathers passing, breakup with Girlfriend and continuing to live with her, feeling isolated with minimal support, and a recent bankruptcy filing.   Date Initiated: 07/16/24     Strengths: \"I am resilient, confident and humorous\"              LONG TERM GOAL:   Date Initiated: 07/16/24  1.0 I will identify 3 signs that my depression and anxiety have improved since starting program.   Target Date: 8/13/24  Completion Date:         SHORT TERM OBJECTIVES:      Date Initiated: 07/16/24  1.1 I create and follow a daily routine that prioritizes self care and interacting with others to begin building structure and purpose into my day.   Revision Date: 7/25/24  Target Date: 7/25/24  Completion Date:      Date Initiated: 07/16/24  1.2 I will identify at least 1 support group I would like to attend and attend at least 1 meeting prior to discharge.   Revision Date: 7/25/24  Target Date: 7/25/24  Completion Date:      Date Initiated: 07/16/24  1.3 I will take medication as prescribed and share questions and concerns if they arise.  Revision Date:7/25/24  Target Date: 7/25/24  Completion Date:       Date Initiated: 07/16/24  1.4 I will identify 3 ways my supports can assist in my recovery and agree to staff/support contact as indicated.   Revision Date:7/25/24  Target Date: 7/25/24  Completion Date:             7 DAY " REVISION:   1.5 I will spend 20 minutes twice a week researching information on residential treatment in order to make an informed decision with my living situation.   Date Initiated:7/25/24  Revision Date:   Target Date: 8/5/24  Completion Date:        PSYCHIATRY:  Date Initiated: 07/16/24  Medication Management and Education       Revision Date: 07/25/24        1.3 Continue medication management    The person(s) responsible for carrying out the plan is Christiano Taveras DO; Sary Kenney PA-C     NURSING/SYMPTOM EDUCATION:  Date Initiated: 07/16/24  1.1, 1.2. 1.3, 1.4 This RN and/or Therapist will provide wellness/symptoms and skill education groups three to five days weekly to educate Gita Rendon on signs and symptoms of diagnoses, skills to manage, and medication questions that will be addressed by the treatment team.    Revision date: 07/25/24   1.1,1.2,1.3,1.4,1.5 Continue to encourage Gita Rendon to participate in wellness/symptoms and skills education groups daily to learn about symptoms, coping strategies and warning signs to promote relapse prevention.     The person(s) responsible for carrying out the plan is Juliet Zamorano RN, ARACELY Pate; LYLA Sanchez ED     PSYCHOLOGY:   Date Initiated: 07/16/24       1.1, 1.2, 1.4 Provide psychotherapy group 5 times per week to allow opportunity for Gita Rendon  to explore stressors and ways of coping.   Revision Date: 07/25/24   1.1,1.2,1.4,1.5  Continue to provide psychotherapy group daily to Gita Rendon and encourage sharing of stressors, skills and positive change.    The person(s) responsible for carrying out the plan is ISA Matute, MARKW; Ashley Costenbader, MA LMT     ALLIED THERAPY:   Date Initiated: 07/16/24  1.1,1.2 Engage Gita Rendon in AT group 3-5 times per week to encourage development and use of wellness tools to decrease symptoms and promote recovery through meaningful activity.  Revision Date:  07/25/24   1.1,1.2,1.5 Continue to engage Gita Rendon to participate in AT group to practice wellness tools within program and transfer to home sharing successes and barriers through healthy task involvement.    The person(s) responsible for carrying out the plan is JHONATAN Scott; MAXIMO Chairez MT-BC     CASE MANAGEMENT:   Date Initiated: 07/16/24      1.0 This  will meet with Gita Rendon  3-4 times weekly to assess treatment progress, discharge planning, connection to community supports and UR as indicated.  Revision Date: 07/25/24   1.0 Continue to meet with Gita Rendon 3-4 times weekly to assess growth, work toward goals, continued treatment needs, dc planning and use of supports.    The person(s) responsible for carrying out the plan is MAXIMO Chairez MT-BC        TREATMENT REVIEW/COMMENTS:      DISCHARGE CRITERIA:Identify 3 signs of progress and complete relapse prevention plan.   DISCHARGE PLAN: Discharge to OP therapy and psychiatry  Estimated Length of Stay: 10 treatment days               Diagnosis and Treatment Plan explained to Gita Pelayo relates understanding diagnosis and is agreeable to Treatment Plan.            CLIENT COMMENTS / Please share your thoughts, feelings, need and/or experiences regarding your treatment plan with Staff.  Please see follow up note with comments.        Signatures can be found on [facility] Treatment plan consent form.

## 2024-07-25 NOTE — PSYCH
EDUCATION THERAPY    5069-4570    Gita Rendon actively shared in morning assessment and goal review. Presented as Receptive related to readiness to learn. Gita Rendon did present with any barriers to learning, Elizabeth stated that she is experiencing racing thoughts and is trying to stay focused this morning. Throughout morning group, Gita Rendon participated in mindfulness exercise. Gita Rendon made positive progress toward goal, by doing errands yesterday afternoon and gaining responsibility and hope. Continue education group to assess willingness to engage, assess transfer of knowledge, and participate in skill development.    Treatment Plan Objective 1.1, 1.2, 1.3, 1.4 , Therapist: Noam Sandhu M.Ed.    GROUP PSYCHOTHERAPY    [id number]    Gita Rendon participated actively in psychotherapy group.  This was observedConsistent throughout the treatment day. They were engaged in learning related to wellness. Staff utilized Verbal teaching methods.  Gita Rendon shared area of learning and set a goal for outside of program to finish paperwork for insurance.  Gita Rendon was able to share items explored during the day and shared in adding to their WRAP.  Ended session with staff led exercise in verbal meditation.  Gita Rendon demonstrated positive progress toward goal.  Continue group psychotherapy to actively practice learned skills and encourage transfer of knowledge to unstructured time.    Treatment Plan Objective 1.1, 1.2, 1.3, 1.4 , Therapist: Noam Sandhu M.Ed    Group Psychotherapy    Group Therapy    Subjective:     Patient ID: Gita Rendon 38 y.o.       This group was facilitated virtually in a private office using HIPAA Compliant and Approved Microsoft Teams.  ([id number])Gita Rendon actively engaged in psychoeducational group about emotional regulation. The skill helps to maintain and regulate emotional expression/regulation. Group discovered  strategies that help them to manage emotional well being on a short term and long term basis. The group talked about understanding the purpose, and meaning of emotional regulation in intellectual and emotional expression, regulation , and recognition, and how it affects themselves and others.. Teaching on the emphasis of self monitoring and self-care, who the group can go to for help was brought up as well. Group was encouraged to ask questions in an open forum at the end of group. Positive progress displayed through engagement in topic, Gita spoke of being in more connected with her emotions and stop hiding them from herself.Gita Rendon will continue to engage in psychotherapy to encourage positive self realization.  Treatment Plan Objective 1.1, 1.2, 1.3, 1.4 Therapist: Noam MILLER Ed.

## 2024-07-26 ENCOUNTER — TELEMEDICINE (OUTPATIENT)
Dept: PSYCHOLOGY | Facility: CLINIC | Age: 39
End: 2024-07-26
Payer: COMMERCIAL

## 2024-07-26 DIAGNOSIS — F43.21 GRIEF: ICD-10-CM

## 2024-07-26 DIAGNOSIS — F41.1 GAD (GENERALIZED ANXIETY DISORDER): Primary | ICD-10-CM

## 2024-07-26 DIAGNOSIS — F33.2 MAJOR DEPRESSIVE DISORDER, RECURRENT, SEVERE WITHOUT PSYCHOTIC FEATURES (HCC): ICD-10-CM

## 2024-07-26 PROCEDURE — G0176 OPPS/PHP;ACTIVITY THERAPY: HCPCS

## 2024-07-26 PROCEDURE — G0177 OPPS/PHP; TRAIN & EDUC SERV: HCPCS

## 2024-07-26 PROCEDURE — G0410 GRP PSYCH PARTIAL HOSP 45-50: HCPCS

## 2024-07-26 NOTE — PSYCH
Virtual Regular Visit    Verification of patient location:    Patient is located at Home in the following state in which I hold an active license [place]      Assessment/Plan:    Problem List Items Addressed This Visit       EILEEN (generalized anxiety disorder) - Primary    Grief    Major depressive disorder, recurrent, severe without psychotic features (HCC)       Goals addressed in session:           Reason for visit is PHP VIRTUAL GROUP DUE TO virtual preference of care      Encounter provider [facility] PARTIAL PSYCH PROGRAM      Recent Visits  No visits were found meeting these conditions.  Showing recent visits within past 7 days and meeting all other requirements  Future Appointments  No visits were found meeting these conditions.  Showing future appointments within next 150 days and meeting all other requirements       The patient was identified by name and date of birth. Gita Rendon was informed that this is a telemedicine visit and that the visit is being conducted throughthe Microsoft Teams platform. She agrees to proceed..  My office door was closed. No one else was in the room.  She acknowledged consent and understanding of privacy and security of the video platform. The patient has agreed to participate and understands they can discontinue the visit at any time.    Patient is aware this is a billable service.     Subjective  Gita Rendon is a 38 y.o. female  .      HPI     Past Medical History:   Diagnosis Date    Allergic     Allergic rhinitis     Anorexia nervosa in remission     Anxiety     Arthritis 5/8/2014    Back pain     Bipolar disorder (HCC)     Depression     Dermatitis 10/16/2021    Disease of thyroid gland     Diverticulitis of colon 9/20/2015    Dizziness     Ear problems     Eating disorder     Esophageal reflux 08/15/2013    GERD (gastroesophageal reflux disease) 8/15/2013    Headache(784.0)     Headache, tension-type     Hypertension     Hypothyroid     Idiopathic intracranial  hypertension     Lumbar degenerative disc disease 05/08/2014    Migraine     Nasal congestion     Nosebleed     Obesity     Obsessive-compulsive disorder     Otitis media     Overactive bladder     Psychiatric disorder     Psychiatric illness     Restless leg syndrome, controlled     Seasonal allergies     Sinusitis     Sleep apnea     Sleep difficulties     Suicide and self-inflicted injury (HCC)     Tinnitus     TMJ dysfunction     Tonsillitis     Transcranial Magnetic Stimulation 09/06/2021    Urinary tract infection     Vision loss        Past Surgical History:   Procedure Laterality Date    DENTAL SURGERY      wisdom teeth-at [age] years. Other surgery dental extraxtion of bone spur (10 years ago)    IR LUMBAR PUNCTURE  02/26/2019    SINUS ENDOSCOPY      SINUS SURGERY      TONSILLECTOMY         Current Outpatient Medications   Medication Sig Dispense Refill    acetaZOLAMIDE (DIAMOX) 250 mg tablet Take 2 tablets (500 mg total) by mouth 2 (two) times a day 120 tablet 2    almotriptan (AXERT) 12.5 MG tablet Take 12.5 mg by mouth once as needed for migraine may repeat in 2 hours if needed.  Limited to 2 x a week      Atogepant (Qulipta) 60 MG TABS Take 60 mg by mouth in the morning      cariprazine (VRAYLAR) 6 MG capsule Take 1 capsule (6 mg total) by mouth daily 30 capsule 0    celecoxib (CeleBREX) 200 mg capsule Take 1 capsule (200 mg total) by mouth 2 (two) times a day 60 capsule 0    Cequa 0.09 % SOLN Apply 1 drop to eye 2 (two) times a day 60 each 0    cetirizine (ZyrTEC) 10 MG chewable tablet Chew 10 mg daily      cholecalciferol 1,000 units tablet Take 5 tablets (5,000 Units total) by mouth daily 150 tablet 0    DULoxetine (CYMBALTA) 60 mg delayed release capsule Take 1 capsule (60 mg total) by mouth daily 30 capsule 1    DULoxetine 40 MG CPEP Take 1 capsule (40 mg total) by mouth daily 30 capsule 0    gabapentin (NEURONTIN) 300 mg capsule Take 1 capsule (300 mg total) by mouth daily after breakfast       gabapentin (NEURONTIN) 600 MG tablet Take 1.5 tablets (900 mg total) by mouth daily at bedtime 45 tablet 0    hydrocortisone (ANUSOL-HC) 2.5 % rectal cream Apply topically 2 (two) times a day 30 g 5    ipratropium (ATROVENT) 0.03 % nasal spray 2 sprays into each nostril every 12 (twelve) hours 30 mL 1    levothyroxine 112 mcg tablet Take 1 tablet (112 mcg total) by mouth daily in the early morning 90 tablet 1    linaCLOtide (Linzess) 290 MCG CAPS Take 1 capsule by mouth daily 90 capsule 1    metFORMIN (GLUCOPHAGE) 500 mg tablet Take 1 tablet (500 mg total) by mouth daily with breakfast 90 tablet 1    Mirabegron ER 25 MG TB24 Take 25 mg by mouth daily 30 tablet 5    Multiple Vitamin (MULTIVITAMIN) capsule Take 1 capsule by mouth daily      omeprazole (PriLOSEC) 20 mg delayed release capsule Take 1 capsule (20 mg total) by mouth 2 (two) times a day 180 capsule 1    prochlorperazine (COMPAZINE) 10 mg tablet Take 10 mg by mouth every 6 (six) hours as needed for nausea or vomiting Take 1 tablet by TID if needed for headache or nausea. Limit to 2x a week .      propranolol (INDERAL) 10 mg tablet Take 1 tablet (10 mg total) by mouth in the morning      Semaglutide-Weight Management (WEGOVY) 1.7 MG/0.75ML Inject 0.75 mL (1.7 mg total) under the skin once a week 3 mL 1    spironolactone (ALDACTONE) 25 mg tablet Take 1 tablet (25 mg total) by mouth daily 30 tablet 0    topiramate (TOPAMAX) 100 mg tablet TAKE 1.5 TABLETS BID       No current facility-administered medications for this visit.        Allergies   Allergen Reactions    Doxycycline      Pt refuses d/t remote H/O intracranial hypertension     Other Other (See Comments)     Seasonal allergies       Review of Systems    Video Exam    There were no vitals filed for this visit.    Physical Exam     I spent FOUR GROUP HOURS PLUS CASE MANAGEMENT minutes with patient today in which greater than 50% of time was spent in counseling/coordination of care regarding PHP - SEE  NOTES

## 2024-07-26 NOTE — PSYCH
Visit Time    Visit Start Time: 1045  Visit Stop Time: 1145  Total Visit Duration: 60 minutes    Subjective:     Patient ID: Gita Rendon is a 38 y.o. female.    [facility] Clinical Progress Notes      Specialized Services Documentation  Therapist must complete separate progress note for each specific clinical activity in which the individual participated during the day.       Group Psychotherapy Life Skills ([id number]) Gita Rendon minimally engaged in an open processing group which was facilitated virtually in a private office using HIPAA Compliant and Approved [employer] Teams. Giat consented to the use of tele-video modality of treatment and was virtually present for group psychotherapy today. The group discussed control and grief. Gita did not participate in the conversations related to the topics. Gita little progress towards goals through verbal participation in group; to accomplish long term goals continue to utilize skills learned in programming. Continue with psychotherapy to educate and encourage use of wellness tools. Tx Plan Objective: 1.1,1.2 Therapist: ISA Matute,MARKW    Case Management Note    ISA Matute    Current suicide risk : Low     A case management session is not scheduled today with Gita Rendon ; additionally, they did not request a CM meeting.  Aware of next 1:1 and med check.    Medications changes/added/denied? No    Treatment session number: 7    Individual Case Management Visit provided today? No    [facility] follow up physician's orders: None at this time

## 2024-07-26 NOTE — PSYCH
"Visit Time  Visit Start Time: 0930  Visit Stop Time: 1030  Total Visit Duration:  60 minutes    Subjective:     Patient ID: Gita Rendon is a 38 y.o. female.    [facility] Clinical Progress Notes      Specialized Services Documentation  Therapist must complete separate progress note for each specific clinical activity in which the individual participated during the day.     Allied Therapy   Group was facilitated virtually in a private office using HIPAA compliant and approved [employer] Teams. Gita Rendon consented to the use of tele-video modality of treatment and was virtually present for group allied therapy.  0930-1030 Gita Rendon actively shared in MTH group focused on universal human needs. Gita was observed to be engaged in therapist led discussion on what the different categories of universal human needs are. Group engaged in matt analyses to \"Human\", \"Imagine\" and \"This is me\" to correspond with the different categories (well  being, connection, and self-expression). Gita listed inclusion and companionship as a need in a song writing activity to \"Everybody\". Some effort noted toward treatment goal. Continue AT to encourage development and practice of human needs.   Tx Plan Objective: 1.1,1.2,1.4, Therapist:  Selena Hussein, MT-BC  "

## 2024-07-26 NOTE — PSYCH
Subjective:     Patient ID: Elizabeth Rendon is a 38 y.o. female.     [facility] Clinical Progress Notes      Specialized Services Documentation  Therapist must complete separate progress note for each specific clinical activity in which the individual participated during the day.      Group Psychotherapy - Wellness Assessment    [id number]   Total Visit Duration: 60 minutes    Elizabeth Rendon participated with prompts in a psychotherapy group focused on  the Wellness Assessment.  This group was facilitated virtually in a private office using HIPAA compliant and approved [employer] teams. Gita Rendon consented to the use of tele-video modality of treatment.  The group engaged in the wellness assessment, which evaluates progress on several different areas of wellness/wellbeing: physical, emotional, cognitive, vocational, social and spiritual. Clients rated their progress and discussed areas that need work. By completing and discussing areas of progress, goals and challenges, members are connected and reminded that, in their mental health struggle, they are not alone. Topics of discussion revolved around setting goals, coping with change, methods to achieve goals. Members also engaged in a mindfulness nature exercise. Elizabeth Rendon continues to make progress towards goals through participation in group activity and personal disclosures.  Elizabeth Rendon struggled to identify ways she could do self care next week to meet her needs.. Continue to run daily group psychotherapy to meet treatment needs and encourage Elizabeth Rendon to practice skills outside of program.      Tx Plan Objective: 1.1,1.2,1.4  Therapist: ARACELY Pate      EDUCATION THERAPY    [id number]    Elizabeth Rendon quietly shared in morning assessment and goal review. Presented as No Interest related to readiness to learn. Elizabeth Rendon  did complete goal from last treatment day identifying gaining responsibility. Elizabeth Rendon did present with any barriers to  learning. Throughout morning group, Elizabeth Rendon participated in mindfulness exercise. Group members journaled about activities that birng them noemí and how they can include them in their routine. Elizabeth Rendon made some progress toward goal. Continue education group to assess willingness to engage, assess transfer of knowledge, and participate in skill development.    Tx Plan Objective: 1.1,1.2,1.4, Therapist: ARACELY Pate    GROUP PSYCHOTHERAPY    [id number]    Elizabeth Rendon participated with prompts in psychotherapy group.  This was observedInconsistent throughout the treatment day. They were engaged in learning related to Wellness Tools. Staff utilized Written teaching methods.  Elizabeth Rendon shared area of learning and set a goal for outside of program to go to her friends party.  Elizabeth Rendon was able to share items explored during the day and shared in reflecting on self care and coping skills. Ended session with staff led exercise of breathing.  Elizabeth Rendon demonstrated good progress toward goal.  Continue group psychotherapy to actively practice learned skills and encourage transfer of knowledge to unstructured time.      Tx Plan Objective: 1.1,1.2,1.4, Therapist: ARACELY Pate

## 2024-07-27 DIAGNOSIS — I10 HYPERTENSION, UNSPECIFIED TYPE: ICD-10-CM

## 2024-07-29 ENCOUNTER — TELEMEDICINE (OUTPATIENT)
Dept: PSYCHOLOGY | Facility: CLINIC | Age: 39
End: 2024-07-29
Payer: COMMERCIAL

## 2024-07-29 DIAGNOSIS — F33.2 MAJOR DEPRESSIVE DISORDER, RECURRENT, SEVERE WITHOUT PSYCHOTIC FEATURES (HCC): Primary | ICD-10-CM

## 2024-07-29 DIAGNOSIS — F43.21 GRIEF: ICD-10-CM

## 2024-07-29 DIAGNOSIS — F41.1 GAD (GENERALIZED ANXIETY DISORDER): ICD-10-CM

## 2024-07-29 PROCEDURE — G0410 GRP PSYCH PARTIAL HOSP 45-50: HCPCS

## 2024-07-29 PROCEDURE — G0177 OPPS/PHP; TRAIN & EDUC SERV: HCPCS

## 2024-07-29 RX ORDER — SPIRONOLACTONE 25 MG/1
25 TABLET ORAL DAILY
Qty: 30 TABLET | Refills: 1 | Status: SHIPPED | OUTPATIENT
Start: 2024-07-29

## 2024-07-29 NOTE — PSYCH
EDUCATION THERAPY    [id number]    Gita Rendon actively shared in morning assessment and goal review. Presented as Receptive related to readiness to learn. Gita Rendon did not present with any barriers to learning. Throughout morning group, Gita Rendon participated in gratitude practice. Gita Rendon made positive progress toward goal, Elizabeth worked on not isolating herself this weekend and spent one day with friends at a party. Elizabeth said she gained support, advocacy, and responsibility.  Continue education group to assess willingness to engage, assess transfer of knowledge, and participate in skill development.    Treatment Plan Objective 1.1, 1.2, 1.3, 1.4 , Therapist: Noam Sandhu M.Ed.    GROUP PSYCHOTHERAPY    [id number]    Gita Rendon participated actively in psychotherapy group.  This was observedConsistent throughout the treatment day. They were engaged in learning related to Illness. Staff utilized Verbal teaching methods.  Gita Rendon shared area of learning and set a goal for outside of program to make a list of questions for her neurology appointment.  Gita Rendon was able to share items explored during the day and shared in adding to their WRAP.  Ended session with staff led exercise self-reflection.  Gita Rendon demonstrated positive progress toward goal.  Continue group psychotherapy to actively practice learned skills and encourage transfer of knowledge to unstructured time.    Treatment Plan Objective 1.1, 1.2, 1.3, 1.4 , Therapist: Noam Sandhu M.Ed    Allied Group Therapy    Subjective:     Patient ID: Gita Rendon 38 y.o.       This group was facilitated virtually in a private office using HIPAA Compliant and Approved Microsoft Teams.  ([id number])Gita Rendon actively engaged in psychoeducational group about positive self soothing activities. These activities help to self-soothe an individual and decrease decrease feelings of  loneliness,anxiety, and boredom. Positive behavior change is focused toward a specified goal. Group explored the identifying factors that create loneliness,anxiety, and boredom. This process can help them to take care of emotional and intellectual moments of anxiety on a short term and long term basis. The group talked about understanding the meaning of self-soothing in regards to physical, intellectual, and emotional expression, regulation , and recognition, and how it affects themselves and others. Teaching on the emphasis of self monitoring and relapse, the group explored who they can go to for help was brought up as well. Group was encouraged to ask questions in an open forum at the end of group. Positive progress displayed through engagement in topic, Elizabeth spoke of coming to a revelation this weekend , that her physical helath is connect to her mental health. She spoke of having to care for her physical ailments to start feeling better overall. Gita Rendon will continue to engage in psychotherapy to encourage positive self realization.  Treatment Plan Objective 1.1, 1.2, 1.3, 1.4 Therapist: Noam MILLER Ed.

## 2024-07-29 NOTE — PSYCH
Visit Time    Visit Start Time: 1215  Visit Stop Time: 1315  Total Visit Duration: 20 minutes      Subjective:     Patient ID: Elizabeth Rendon is a 38 y.o. female.     [facility] Clinical Progress Notes      Specialized Services Documentation  Therapist must complete separate progress note for each specific clinical activity in which the individual participated during the day.      Group Psychotherapy - Anger Management   Elizabeth Rendon participated with prompts in a psychotherapy group focused on Anger Management. Today group members focused on the warning signs of their anger cycle of anger, evaluating each aspect of the cycle, and apply personal experience to the cycle. Group members also were to complete a personal anger thermometer to track the stages of their anger development. The goal of today was for group members to participate in groups discussion on the cycle of anger, warning signs, and coping techniques. As well as evaluate coping techniques they could utilize to cope with anger and stop the cycle. Members evaluated their anger triggers and identified ways they struggle to work through anger on top of ways they cope. This writer encouraged members to openly share and integrate coping skills into their daily routine. Gita Rendon made good efforts towards progress goals which were displayed through participation, notetaking, and engagement in topic.    Tx Plan Objective: 1.1,1.2,1.4 Therapist: Ashley Costenbader MA LMT      This group was facilitated virtually in a private office using HIPAA Compliant and Approved Microsoft Teams.  Gita Rendon consented to the use of tele-video modality of treatment.

## 2024-07-29 NOTE — PSYCH
Virtual Regular Visit    Verification of patient location:    Patient is located at Home in the following state in which I hold an active license [place]      Assessment/Plan:    Problem List Items Addressed This Visit          Behavioral Health    EILEEN (generalized anxiety disorder)    Grief    Major depressive disorder, recurrent, severe without psychotic features (HCC) - Primary       Goals addressed in session:           Reason for visit is PHP VIRTUAL GROUP DUE TO VIRTUAL PREFERENCE      Encounter provider GH PARTIAL PSYCH PROGRAM      Recent Visits  No visits were found meeting these conditions.  Showing recent visits within past 7 days and meeting all other requirements  Future Appointments  No visits were found meeting these conditions.  Showing future appointments within next 150 days and meeting all other requirements       The patient was identified by name and date of birth. Gita Rendon was informed that this is a telemedicine visit and that the visit is being conducted throughthe Microsoft Teams platform. She agrees to proceed..  My office door was closed. No one else was in the room.  She acknowledged consent and understanding of privacy and security of the video platform. The patient has agreed to participate and understands they can discontinue the visit at any time.    Patient is aware this is a billable service.     Subjective  Gita Rendon is a 38 y.o. female .      HPI     Past Medical History:   Diagnosis Date    Allergic     Allergic rhinitis     Anorexia nervosa in remission     Anxiety     Arthritis 5/8/2014    Back pain     Bipolar disorder (HCC)     Depression     Dermatitis 10/16/2021    Disease of thyroid gland     Diverticulitis of colon 9/20/2015    Dizziness     Ear problems     Eating disorder     Esophageal reflux 08/15/2013    GERD (gastroesophageal reflux disease) 8/15/2013    Headache(784.0)     Headache, tension-type     Hypertension     Hypothyroid     Idiopathic  intracranial hypertension     Lumbar degenerative disc disease 05/08/2014    Migraine     Nasal congestion     Nosebleed     Obesity     Obsessive-compulsive disorder     Otitis media     Overactive bladder     Psychiatric disorder     Psychiatric illness     Restless leg syndrome, controlled     Seasonal allergies     Sinusitis     Sleep apnea     Sleep difficulties     Suicide and self-inflicted injury (HCC)     Tinnitus     TMJ dysfunction     Tonsillitis     Transcranial Magnetic Stimulation 09/06/2021    Urinary tract infection     Vision loss        Past Surgical History:   Procedure Laterality Date    DENTAL SURGERY      wisdom teeth-at [age] years. Other surgery dental extraxtion of bone spur (10 years ago)    IR LUMBAR PUNCTURE  02/26/2019    SINUS ENDOSCOPY      SINUS SURGERY      TONSILLECTOMY         Current Outpatient Medications   Medication Sig Dispense Refill    acetaZOLAMIDE (DIAMOX) 250 mg tablet Take 2 tablets (500 mg total) by mouth 2 (two) times a day 120 tablet 2    almotriptan (AXERT) 12.5 MG tablet Take 12.5 mg by mouth once as needed for migraine may repeat in 2 hours if needed.  Limited to 2 x a week      Atogepant (Qulipta) 60 MG TABS Take 60 mg by mouth in the morning      cariprazine (VRAYLAR) 6 MG capsule Take 1 capsule (6 mg total) by mouth daily 30 capsule 0    celecoxib (CeleBREX) 200 mg capsule Take 1 capsule (200 mg total) by mouth 2 (two) times a day 60 capsule 0    Cequa 0.09 % SOLN Apply 1 drop to eye 2 (two) times a day 60 each 0    cetirizine (ZyrTEC) 10 MG chewable tablet Chew 10 mg daily      cholecalciferol 1,000 units tablet Take 5 tablets (5,000 Units total) by mouth daily 150 tablet 0    DULoxetine (CYMBALTA) 60 mg delayed release capsule Take 1 capsule (60 mg total) by mouth daily 30 capsule 1    DULoxetine 40 MG CPEP Take 1 capsule (40 mg total) by mouth daily 30 capsule 0    gabapentin (NEURONTIN) 300 mg capsule Take 1 capsule (300 mg total) by mouth daily after  breakfast      gabapentin (NEURONTIN) 600 MG tablet Take 1.5 tablets (900 mg total) by mouth daily at bedtime 45 tablet 0    hydrocortisone (ANUSOL-HC) 2.5 % rectal cream Apply topically 2 (two) times a day 30 g 5    ipratropium (ATROVENT) 0.03 % nasal spray 2 sprays into each nostril every 12 (twelve) hours 30 mL 1    levothyroxine 112 mcg tablet Take 1 tablet (112 mcg total) by mouth daily in the early morning 90 tablet 1    linaCLOtide (Linzess) 290 MCG CAPS Take 1 capsule by mouth daily 90 capsule 1    metFORMIN (GLUCOPHAGE) 500 mg tablet Take 1 tablet (500 mg total) by mouth daily with breakfast 90 tablet 1    Mirabegron ER 25 MG TB24 Take 25 mg by mouth daily 30 tablet 5    Multiple Vitamin (MULTIVITAMIN) capsule Take 1 capsule by mouth daily      omeprazole (PriLOSEC) 20 mg delayed release capsule Take 1 capsule (20 mg total) by mouth 2 (two) times a day 180 capsule 1    prochlorperazine (COMPAZINE) 10 mg tablet Take 10 mg by mouth every 6 (six) hours as needed for nausea or vomiting Take 1 tablet by TID if needed for headache or nausea. Limit to 2x a week .      propranolol (INDERAL) 10 mg tablet Take 1 tablet (10 mg total) by mouth in the morning      Semaglutide-Weight Management (WEGOVY) 1.7 MG/0.75ML Inject 0.75 mL (1.7 mg total) under the skin once a week 3 mL 1    spironolactone (ALDACTONE) 25 mg tablet Take 1 tablet (25 mg total) by mouth daily 30 tablet 1    topiramate (TOPAMAX) 100 mg tablet TAKE 1.5 TABLETS BID       No current facility-administered medications for this visit.        Allergies   Allergen Reactions    Doxycycline      Pt refuses d/t remote H/O intracranial hypertension     Other Other (See Comments)     Seasonal allergies       Review of Systems    Video Exam    There were no vitals filed for this visit.    Physical Exam     Visit Time    I spent FOUR GROUP HOURS PLUS CASE MANAGEMENT minutes with patient today in which greater than 50% of the time was spent in  counseling/coordination of care regarding PHP - SEE NOTES.

## 2024-07-29 NOTE — PSYCH
"Subjective:     Patient ID: Gita Rendon is a 38 y.o. female.    [facility] Clinical Progress Notes      Specialized Services Documentation  Therapist must complete separate progress note for each specific clinical activity in which the individual participated during the day.     Case Management Note    Garrett Smyth [facility]    Current suicide risk : Low     Medications changes/added/denied? No    Treatment session number: 8    Individual Case Management Visit provided today? Yes     [facility] follow up physician's orders: None at this time.         Individual Psychotherapy    Time: 2280-3497    DAP:    Data - Met with Gita for individual therapy/case management. She stated that she was doing good and bad. She stated that she feels she is learning from program but has difficulty naming any specific learning or skills she is applying. She stated she realized that her mental health is more effected by her physical health than she previously thought. This writer discussed the concept of radical acceptance with her and related it to her chronic illness. She stated that she feels there is little she can do due to the physical limitations of her illness. When challenged and offered suggestions on what she can be doing for her mental health she responded multiple times with \"but that's really hard\". This writer suggested that she start adding more structure to her day that builds in self care and to go out of the house and socialize when she feels physically up to it. She stated she thought this was a good idea but was unsure if she could hold to it. This writer assigned homework to journal about what she wants her life to look like with the foundation of radical acceptance.     Assessment - Gita struggles to implement learning from group. She was unable to list a coping skills she feels she can use in her life. While she states that she wants to do things that make her feel better her actions do not " reflect this at this time Practicing radical acceptance and building in self care and structure to her days would benefit her, though she showed minimal motivation to make this change.     Plan  - Continue work towards treatment goals. Journal about what she wants her life to look like using radical acceptance toward her physical health.     Tx plan Objectives: 1.0    MAXIMO Chairez, MT-BC

## 2024-07-29 NOTE — PSYCH
Visit Time    Visit Start Time: 1045  Visit Stop Time: 1145  Total Visit Duration:  60 minutes    Subjective:     Patient ID: Gita Rendon is a 38 y.o.     [facility] Clinical Progress Notes      Specialized Services Documentation  Therapist must complete separate progress note for each specific clinical activity in which the individual participated during the day.       Group Psychotherapy  This group was facilitated virtually in a private office using HIPAA Compliant and Approved Microsoft Teams. Gita Rendon consented to the use of tele-video modality of treatment  ([id number]) Gita Rendon attended group- Wellness Recovery Action Plan. Today, members focused on completing WRAP's toolbox then following specific sections and encouraged to fill in coping tools from their toolbox for planned responses:   Daily Plan   Identification of triggers and stressors  Early warning signs  When things are breaking down and or getting worse    Group members shared pieces of information from these sections and identified their importance. Writer encouraged the members of group to continue utilizing the packet to develop plans inside and outside of program. Good effort towards progress of goals which were displayed through participation and engagement in topic.  Treatment Plan Objectives: 1.1, 1.2  Therapist: Juliet PROCTOR RN

## 2024-07-30 ENCOUNTER — TELEMEDICINE (OUTPATIENT)
Dept: PSYCHOLOGY | Facility: CLINIC | Age: 39
End: 2024-07-30
Payer: COMMERCIAL

## 2024-07-30 DIAGNOSIS — F33.2 MAJOR DEPRESSIVE DISORDER, RECURRENT, SEVERE WITHOUT PSYCHOTIC FEATURES (HCC): Primary | ICD-10-CM

## 2024-07-30 DIAGNOSIS — F41.1 GAD (GENERALIZED ANXIETY DISORDER): ICD-10-CM

## 2024-07-30 DIAGNOSIS — F43.21 GRIEF: ICD-10-CM

## 2024-07-30 PROCEDURE — G0410 GRP PSYCH PARTIAL HOSP 45-50: HCPCS

## 2024-07-30 PROCEDURE — G0177 OPPS/PHP; TRAIN & EDUC SERV: HCPCS

## 2024-07-30 NOTE — PSYCH
EDUCATION THERAPY    2722-9497    Gita Rendon actively shared in morning assessment and goal review. Presented as Receptive related to readiness to learn. Gita Rendon did not present with any barriers to learning. Throughout morning group, Gita Rendon participated in mindfulness exercise. Gita Rendon made no progress toward goal, Elizabeth wants to make a list of concerns she has for her neurology appointment coming up. Elizabeth wants to gain responsibility, support, and advocacy. Continue education group to assess willingness to engage, assess transfer of knowledge, and participate in skill development.    Treatment Plan Objective 1.1, 1.2, 1.3, 1.4 , Therapist: Noam Sandhu M.Ed.    GROUP PSYCHOTHERAPY    [id number]    Gita Rendon participated actively in psychotherapy group.  This was observedConsistent throughout the treatment day. They were engaged in learning related to Wellness Tools. Staff utilized Demonstration teaching methods.  Gita Rendon shared area of learning and set a goal for outside of program to work on case management materials about the connection between physical health and mental health..  Gita Rendon was able to share items explored during the day and shared in adding to their WRAP.  Ended session with staff led exercise on mindfulness.  Gita Rendon demonstrated positive progress toward goal.  Continue group psychotherapy to actively practice learned skills and encourage transfer of knowledge to unstructured time.    Treatment Plan Objective 1.1, 1.2, 1.3, 1.4 , Therapist: Noam Sandhu M.Ed      Group Psychotherapy    Group Therapy    Subjective:     Patient ID: Gita Rendon 38 y.o.       This group was facilitated virtually in a private office using HIPAA Compliant and Approved Microsoft Teams.  ([id number])  Gita Rendon actively  engaged in psychoeducational group about online support applications to enhance and enrich the group therapy  skills. The group came up with strategies that helped them to use the apps and incorporate these apps into self improvement.  The group talked about understanding the purpose, type, and the timing of when to use these apps. Teaching on emergency situations and who to go to for help was brought up as well. Group was encouraged to ask questions in an open forum at the end of group. Adequate progress displayed through engagement in topic, Elizabeth said she may be willing to look at several of the applications for PTSD and how to learn to better manage her anxiety.Gita Rendon will continue to engage in psychotherapy to encourage positive self realization.  Treatment Plan Objective 1.1, 1.2, 1.3, 1.4 Therapist: Noam MILLER Ed.

## 2024-07-30 NOTE — PSYCH
Subjective:     Patient ID: Gita Rendon is a 38 y.o. female.    [facility] Clinical Progress Notes      Specialized Services Documentation  Therapist must complete separate progress note for each specific clinical activity in which the individual participated during the day.     Case Management Note    MAXIMO Chairez, MT-BC    Current suicide risk : Low     No case management requested or scheduled. Aware of next 1:1    Medications changes/added/denied? No    Treatment session number: 9    Individual Case Management Visit provided today? No    [facility] follow up physician's orders: None at this time.

## 2024-07-30 NOTE — PSYCH
Subjective:     Patient ID: Elizabeth Rendon is a 38 y.o. female.     [facility] Clinical Progress Notes      Specialized Services Documentation  Therapist must complete separate progress note for each specific clinical activity in which the individual participated during the day.      Group Psychotherapy - Boundaries/Chepe    [id number] Elizabeth Rendon participated actively in a psychotherapy group focused on chepe. This group was facilitated virtually in a private office using HIPAA compliant and approved Microsoft teams. Gita Rendon consented to the use of tele-video modality of treatment. This writer facilitated a discussion on boundaries, relationships, and communication. Members were encouraged to practice setting boundaries through the completion of the Chepe outline both in group and outside of programming. This writer reviewed in detail the Chepe acronym and led a group activity that allowed all group members to contribute to a group chepe script. Group members were then encouraged to write their personal chepe script and share it with the group.Elizabeth Rendon made good efforts towards progress goals which were displayed through participation, notetaking, and engagement in topic. Elizabeth appeared to take notes and participate in the discussion throughout group. Continue to run daily group psychotherapy to meet treatment needs and encourage Elizabeth Rendon to practice skills outside of program.      Tx Plan Objective: 1.1,1.2,1.4 Therapist: ARACELY Pate

## 2024-07-30 NOTE — PSYCH
Visit Time    Visit Start Time: 0930  Visit Stop Time: 1030  Total Visit Duration: 60 minutes    Subjective:     Patient ID: Gita Rendon is a 38 y.o. female.    [facility] Clinical Progress Notes      Specialized Services Documentation  Therapist must complete separate progress note for each specific clinical activity in which the individual participated during the day.       Group Psychotherapy Life Skills (0930-1030) Gita Rendon actively engaged in group focused on their personal narrative using the tree of life tool which was facilitated virtually in a private office using HIPAA Compliant and Approved Microsoft Teams. Gita consented to the use of tele-video modality of treatment and was virtually present for group psychotherapy today. The group watched a TONIE talk “The importance of sharing your story” with speaker Domenic Ojeda. The group created their own tree of life which represents who they are. They had to write words that answered the questions about each part of the tree which represents them. Group members identified what they learned by doing this activity. Gita recognized that she needs understanding of herself and friendships in order to grow. Gita created a goal to work towards accomplishing the one area that she wants to grow. Group members discussed why it is important to share their story with at least one person. Gita continues to make progress towards goals through verbal participation in group; to accomplish long term goals continue to utilize skills learned in programming. Continue with psychotherapy to educate and encourage use of wellness tools. Tx Plan Objective: 1.1,1.2 Therapist: ISA Matute, ISA Walker LSW

## 2024-07-31 ENCOUNTER — DOCUMENTATION (OUTPATIENT)
Dept: PSYCHOLOGY | Facility: CLINIC | Age: 39
End: 2024-07-31

## 2024-07-31 ENCOUNTER — APPOINTMENT (OUTPATIENT)
Dept: PSYCHOLOGY | Facility: CLINIC | Age: 39
End: 2024-07-31
Payer: COMMERCIAL

## 2024-07-31 NOTE — PROGRESS NOTES
Subjective:     Patient ID: Gita Rendon is a 38 y.o. female.    [facility] Clinical Progress Notes      Specialized Services Documentation  Therapist must complete separate progress note for each specific clinical activity in which the individual participated during the day.     Case Management Note    MAXIMO Chairez, [facility]    Current suicide risk : Danny Johnson called informing that she would not be attending program today due to having a bad migraine. This writer attempted to call but received voicemail. Left message requesting callback.     Medications changes/added/denied? No    Treatment session number: n/a    Individual Case Management Visit provided today? No    [facility] follow up physician's orders: None at this time.     
DISPLAY PLAN FREE TEXT

## 2024-08-01 ENCOUNTER — TELEMEDICINE (OUTPATIENT)
Dept: PSYCHOLOGY | Facility: CLINIC | Age: 39
End: 2024-08-01
Payer: COMMERCIAL

## 2024-08-01 ENCOUNTER — TELEMEDICINE (OUTPATIENT)
Dept: PSYCHIATRY | Facility: CLINIC | Age: 39
End: 2024-08-01
Payer: COMMERCIAL

## 2024-08-01 DIAGNOSIS — F41.1 GAD (GENERALIZED ANXIETY DISORDER): ICD-10-CM

## 2024-08-01 DIAGNOSIS — F33.2 MAJOR DEPRESSIVE DISORDER, RECURRENT, SEVERE WITHOUT PSYCHOTIC FEATURES (HCC): Primary | ICD-10-CM

## 2024-08-01 DIAGNOSIS — F43.21 GRIEF: ICD-10-CM

## 2024-08-01 DIAGNOSIS — F33.2 SEVERE EPISODE OF RECURRENT MAJOR DEPRESSIVE DISORDER, WITHOUT PSYCHOTIC FEATURES (HCC): Primary | ICD-10-CM

## 2024-08-01 PROCEDURE — 99214 OFFICE O/P EST MOD 30 MIN: CPT | Performed by: PHYSICIAN ASSISTANT

## 2024-08-01 PROCEDURE — G0410 GRP PSYCH PARTIAL HOSP 45-50: HCPCS

## 2024-08-01 PROCEDURE — G0177 OPPS/PHP; TRAIN & EDUC SERV: HCPCS

## 2024-08-01 NOTE — PSYCH
Virtual Regular Visit    Verification of patient location:    Patient is located at Home in the following state in which I hold an active license [place]      Assessment/Plan:    Problem List Items Addressed This Visit       EILEEN (generalized anxiety disorder)    Grief    Major depressive disorder, recurrent, severe without psychotic features (HCC) - Primary       Goals addressed in session:           Reason for visit is PHP VIRTUAL GROUP DUE TO virtual preference of care      Encounter provider GH PARTIAL PSYCH PROGRAM      Recent Visits  No visits were found meeting these conditions.  Showing recent visits within past 7 days and meeting all other requirements  Future Appointments  No visits were found meeting these conditions.  Showing future appointments within next 150 days and meeting all other requirements       The patient was identified by name and date of birth. Gita Rendon was informed that this is a telemedicine visit and that the visit is being conducted throughthe Microsoft Teams platform. She agrees to proceed..  My office door was closed. No one else was in the room.  She acknowledged consent and understanding of privacy and security of the video platform. The patient has agreed to participate and understands they can discontinue the visit at any time.    Patient is aware this is a billable service.     Subjective  Gita Rendon is a 38 y.o. female  .      HPI     Past Medical History:   Diagnosis Date    Allergic     Allergic rhinitis     Anorexia nervosa in remission     Anxiety     Arthritis 5/8/2014    Back pain     Bipolar disorder (HCC)     Depression     Dermatitis 10/16/2021    Disease of thyroid gland     Diverticulitis of colon 9/20/2015    Dizziness     Ear problems     Eating disorder     Esophageal reflux 08/15/2013    GERD (gastroesophageal reflux disease) 8/15/2013    Headache(784.0)     Headache, tension-type     Hypertension     Hypothyroid     Idiopathic intracranial  hypertension     Lumbar degenerative disc disease 05/08/2014    Migraine     Nasal congestion     Nosebleed     Obesity     Obsessive-compulsive disorder     Otitis media     Overactive bladder     Psychiatric disorder     Psychiatric illness     Restless leg syndrome, controlled     Seasonal allergies     Sinusitis     Sleep apnea     Sleep difficulties     Suicide and self-inflicted injury (HCC)     Tinnitus     TMJ dysfunction     Tonsillitis     Transcranial Magnetic Stimulation 09/06/2021    Urinary tract infection     Vision loss        Past Surgical History:   Procedure Laterality Date    DENTAL SURGERY      wisdom teeth-at [age] years. Other surgery dental extraxtion of bone spur (10 years ago)    IR LUMBAR PUNCTURE  02/26/2019    SINUS ENDOSCOPY      SINUS SURGERY      TONSILLECTOMY         Current Outpatient Medications   Medication Sig Dispense Refill    acetaZOLAMIDE (DIAMOX) 250 mg tablet Take 2 tablets (500 mg total) by mouth 2 (two) times a day 120 tablet 2    almotriptan (AXERT) 12.5 MG tablet Take 12.5 mg by mouth once as needed for migraine may repeat in 2 hours if needed.  Limited to 2 x a week      Atogepant (Qulipta) 60 MG TABS Take 60 mg by mouth in the morning      cariprazine (VRAYLAR) 6 MG capsule Take 1 capsule (6 mg total) by mouth daily 30 capsule 0    celecoxib (CeleBREX) 200 mg capsule Take 1 capsule (200 mg total) by mouth 2 (two) times a day 60 capsule 0    Cequa 0.09 % SOLN Apply 1 drop to eye 2 (two) times a day 60 each 0    cetirizine (ZyrTEC) 10 MG chewable tablet Chew 10 mg daily      cholecalciferol 1,000 units tablet Take 5 tablets (5,000 Units total) by mouth daily 150 tablet 0    DULoxetine (CYMBALTA) 60 mg delayed release capsule Take 1 capsule (60 mg total) by mouth daily 30 capsule 1    gabapentin (NEURONTIN) 300 mg capsule Take 1 capsule (300 mg total) by mouth daily after breakfast      gabapentin (NEURONTIN) 600 MG tablet Take 1.5 tablets (900 mg total) by mouth daily  at bedtime 45 tablet 0    hydrocortisone (ANUSOL-HC) 2.5 % rectal cream Apply topically 2 (two) times a day 30 g 5    ipratropium (ATROVENT) 0.03 % nasal spray 2 sprays into each nostril every 12 (twelve) hours 30 mL 1    levothyroxine 112 mcg tablet Take 1 tablet (112 mcg total) by mouth daily in the early morning 90 tablet 1    linaCLOtide (Linzess) 290 MCG CAPS Take 1 capsule by mouth daily 90 capsule 1    metFORMIN (GLUCOPHAGE) 500 mg tablet Take 1 tablet (500 mg total) by mouth daily with breakfast 90 tablet 1    Mirabegron ER 25 MG TB24 Take 25 mg by mouth daily 30 tablet 5    Multiple Vitamin (MULTIVITAMIN) capsule Take 1 capsule by mouth daily      omeprazole (PriLOSEC) 20 mg delayed release capsule Take 1 capsule (20 mg total) by mouth 2 (two) times a day 180 capsule 1    prochlorperazine (COMPAZINE) 10 mg tablet Take 10 mg by mouth every 6 (six) hours as needed for nausea or vomiting Take 1 tablet by TID if needed for headache or nausea. Limit to 2x a week .      Semaglutide-Weight Management (WEGOVY) 1.7 MG/0.75ML Inject 0.75 mL (1.7 mg total) under the skin once a week 3 mL 1    spironolactone (ALDACTONE) 25 mg tablet Take 1 tablet (25 mg total) by mouth daily 30 tablet 1    topiramate (TOPAMAX) 100 mg tablet TAKE 1.5 TABLETS BID       No current facility-administered medications for this visit.        Allergies   Allergen Reactions    Doxycycline      Pt refuses d/t remote H/O intracranial hypertension     Other Other (See Comments)     Seasonal allergies       Review of Systems    Video Exam    There were no vitals filed for this visit.    Physical Exam     I spent FOUR GROUP HOURS PLUS CASE MANAGEMENT minutes with patient today in which greater than 50% of time was spent in counseling/coordination of care regarding PHP - SEE NOTES

## 2024-08-01 NOTE — BH CRISIS PLAN
Client Name: Elizabeth Rendon       Client YOB: 1985    [facility]      Creation Date: 8/1/24 Update Date: 6/1/25   Created By: Garrett Smyth       Step 1: Warning Signs:   Warning Signs   crying all the time   staying in bed   racing thoughts   Increased isolation/not reaching out to people            Step 2: Internal Coping Strategies:   Internal Coping Strategies   Breathing techniques   coloring   listening to music   going for a walk            Step 3: People and social settings that provide distraction:   Name Contact Information   Sukhdev in phone   Cathy in phone   Aunt and Uncle in phone    Places   Park           Step 4: People whom I can ask for help during a crisis:      Name Contact Information    My aunt in phone      Step 5: Professionals or agencies I can contact during a crisis:      Clinican/Agency Name Phone Emergency Contact    [facility] (Gabriella) In phone     [facility] counseling in phone       [facility] Emergency Department Emergency Department Phone Emergency Department Address    [facility]  [address] [address]        Crisis Phone Numbers:   Suicide Prevention Lifeline: Call or Text  983 Crisis Text Line: Text HOME to 403-973   Please note: Some [facility] do not have a separate number for Child/Adolescent specific crisis. If your county is not listed under Child/Adolescent, please call the adult number for your county      Adult Crisis Numbers: Child/Adolescent Crisis Numbers   [facility]: [phone] [facility]: [phone]   [facility]: [phone] [facility]: [phone]   [facility]: [phone] [place]: [phone]   [facility]: [phone] [place]: [phone]   [place] [facility]: [phone]   [facility]: [phone]   [facility]: [phone]   [facility]: [phone] (daytime) [phone] (after hours, weekends, holidays)      Step 6: Making the  environment safer (plan for lethal means safety):   Plan: Limiting access to medication     Optional: What is most important to me and worth living for?   Hope for a better tomorrow.      [facility] Safety Plan. Nell Teixeira and Ruy Boyce. Used with permission of the authors.

## 2024-08-01 NOTE — PSYCH
"Subjective:     Patient ID: Gita Rendon is a 38 y.o. female.    [facility] Clinical Progress Notes      Specialized Services Documentation  Therapist must complete separate progress note for each specific clinical activity in which the individual participated during the day.     Case Management Note    MAXIMO Chairez, MT-BC    Current suicide risk : Low     [id number] Met with Gita for case management. She stated she feels minimal improvement and has not been applying skills outside of group because \"its hard\". This writer challenged her to commit to one self care action she could take every day and she would not identify one. She stated she still wished to discharge on Monday 8/5. Safety plan created and signed.     I,Gita Rendon,am physically unable to provide a signature; however, I understand the nature of and am in agreement with the documentation presented to me via TEAMS.  I have received a copy through My Chart and/or the [employer] Postal [employer].  I freely give verbal consent.  Name of Document (s): safety plan  Witness to verbal consent: MAXIMO Chairez MTRobinBC  Witness to verbal consent: Gita Vallejo    Medications changes/added/denied? Yes, See Sary Kenney PA-C Note.      Treatment session number: 10    Individual Case Management Visit provided today? Yes     [facility] follow up physician's orders: None at this time.     "

## 2024-08-01 NOTE — PSYCH
Visit Time     Visit Start Time: 1045  Visit Stop Time: 1145  Total Visit Duration:  30minutes     Subjective:        Subjective  Patient ID: Gita Rendon is a 38 y.o. y.o. female.     [facility] Clinical Progress Notes       Specialized Services Documentation  Therapist must complete separate progress note for each specific clinical activity in which the individual participated during the day.     Gita Rendon quietly engaged in an open processing group.  The group discussed boundaries, letting go but not forgiving past trauma events, and anxiety towards career changes  . Gita Rendon did not participate in the conversations related to the topics. Gita Rendon struggled to make progress towards goals through verbal participation in group; to accomplish long term goals continue to utilize skills learned in programming. Continue with psychotherapy to educate and encourage use of wellness tools. Tx Plan Objective: 1.1,1.2 Therapist: Ashley Costenbader MA LMT

## 2024-08-01 NOTE — PSYCH
Subjective:     Patient ID: Elizabeth Rendon is a 38 y.o. female.     [facility] Clinical Progress Notes      Specialized Services Documentation  Therapist must complete separate progress note for each specific clinical activity in which the individual participated during the day.      Group Psychotherapy - Self-Sabotage   0930-1030 Elizabeth Rendon participated actively in a psychotherapy group focused on self-sabotage. This group was facilitated virtually in a private office using HIPAA compliant and approved [employer] teams. Gita Rendon consented to the use of tele-video modality of treatment.  This writer led a group discussion on what self-sabotage is and how it effects wellness. Following this, the group reviewed the types/signs of self-sabotage and applied it to their own behaviors to see which they engage in most frequently. The group members were provided a worksheet to complete to assess and stop their self-sabotaging behaviors. This worksheet provided a detailed plan on how they can overcome their specific self-sabotaging habits. Elizabeth Rendon made good progress towards treatments goals as displayed through note taking and participation in the discussion. Elizabeth Rendon identified the hypervigilant as one of their internal saboteurs. Continue to run daily group psychotherapy to meet treatment needs and encourage Elizabeth Rendon to practice skills outside of program.     Tx Plan Objective: 1.1,1.2,1.4 Therapist: ARACELY Pate         EDUCATION THERAPY    0830-0930    Elizabeth Rendon actively shared in morning assessment and goal review. Presented as Receptive related to readiness to learn. Elizabeth Rendon  did not complete goal from last treatment day identifying gaining responsibility and support. Elizabeth Rendon did not present with any barriers to learning. Throughout morning group, Elizabeth Rendon participated in mindfulness exercise. Group members journaled about activities that bring them noemí and how they can  include them in their routine. Elizabeth Rendon made good progress toward goal. Continue education group to assess willingness to engage, assess transfer of knowledge, and participate in skill development.    Tx Plan Objective: 1.1,1.2,1.4, Therapist: ARACELY Pate    GROUP PSYCHOTHERAPY    [id number]    Elizabeth Rendon participated actively in psychotherapy group.  This was observedConsistent throughout the treatment day. They were engaged in learning related to Wellness Tools. Staff utilized Verbal teaching methods.  Elizabeth Rendon shared area of learning and set a goal for outside of program to make dinner.  Elizabeth Rendon was able to share items explored during the day and shared in reflecting on self sabotage. Ended session with staff led exercise of breathing.  Elizabeth Rendon demonstrated good progress toward goal.  Continue group psychotherapy to actively practice learned skills and encourage transfer of knowledge to unstructured time.      Tx Plan Objective: 1.1,1.2,1.4, Therapist: ARACELY Pate

## 2024-08-01 NOTE — PSYCH
"  Virtual Regular Visit    Verification of patient location:    Patient is located at Home in the following state in which I hold an active license [place]           Reason for visit is   Chief Complaint   Patient presents with    Virtual Regular Visit          Encounter provider Sary Kenney PA-C      Recent Visits  No visits were found meeting these conditions.  Showing recent visits within past 7 days and meeting all other requirements  Today's Visits  Date Type Provider Dept   08/01/24 Telemedicine Sary Kenney PA-C  [facility]   Showing today's visits and meeting all other requirements  Future Appointments  No visits were found meeting these conditions.  Showing future appointments within next 150 days and meeting all other requirements       The patient was identified by name and date of birth. Gita Rendon was informed that this is a telemedicine visit and that the visit is being conducted throughthe Microsoft Teams platform. She agrees to proceed..  My office door was closed. No one else was in the room.  She acknowledged consent and understanding of privacy and security of the video platform. The patient has agreed to participate and understands they can discontinue the visit at any time.    Patient is aware this is a billable service.           Progress Note - [facility] [place] [facility]  Gita Rendon 38 y.o. female MRN: [id number]   This note was not shared with the patient due to this is a psychotherapy note    Progress at partial program: unchanged.      Chart reviewed. Elizabeth seen by Dr Taveras 7/16 at which time cymbalta increased form 40 mg to 60 mg.  Elizabeth notes no change. Primary symptoms reported today: depressed mood, racing thoughts, poor concentration, poor appetite, decreased energy, hypersomnia, loss of interest, amotivation; anxious about her health and anxious when she drives.  Feels like \"a shell of a person\".  C/o headaches and feeling " rundown. SI comes and goes- no urgews to act- would call crisis or her aunt/uncle.  Struggling to utilize coping tools learned in program.       ROS:   As above otherwise negative    Active Ambulatory Problems     Diagnosis Date Noted    Obsessive-compulsive disorder 01/24/2018    Hypertension 01/24/2018    Hypothyroidism 01/24/2018    Overactive bladder 01/24/2018    Binge-eating disorder, severe 09/09/2016    Gambling disorder, moderate 06/15/2015    Dysfunction of both eustachian tubes 05/20/2018    Bulging lumbar disc 05/08/2014    Esophageal reflux 08/15/2013    EILEEN (generalized anxiety disorder) 11/18/2015    Lumbar degenerative disc disease 05/08/2014    Morbid obesity (HCC) 09/25/2014    Nocturnal enuresis 05/16/2017    PTSD (post-traumatic stress disorder) 10/12/2017    Urgency incontinence 07/06/2017    Vitamin D deficiency 01/15/2014    Chronic midline low back pain without sciatica 07/17/2018    Iliotibial band syndrome of left side 09/04/2018    Piriformis syndrome of left side 09/04/2018    Skin lesion 09/06/2018    Prediabetes 11/29/2018    Family history of cancer 01/22/2019    Intracranial hypertension 02/28/2019    Chronic migraine without aura without status migrainosus, not intractable 03/04/2019    Chronic tension-type headache, not intractable 03/04/2019    Menorrhagia with irregular cycle 12/30/2019    Encounter for insertion of mirena IUD 02/10/2020    CAIO (obstructive sleep apnea)     IUD check up 03/06/2020    Cervicalgia 01/06/2021    Numbness and tingling in both hands 09/24/2021    Dermatitis 10/16/2021    Chronic constipation 10/16/2021    Thyroid disease neuropathy (HCC) 03/19/2022    Encounter for medication monitoring 07/06/2022    Hypervolemia 07/06/2022    Migraine headache 08/22/2022    Hyperaldosteronism (HCC) 09/16/2022    Persistent proteinuria 01/05/2023    Xerophthalmia 09/21/2023    Xerostomia 09/21/2023    Bilateral lower extremity edema 11/10/2023    Severe episode of  recurrent major depressive disorder, without psychotic features ([facility]) 07/05/2024    Medical clearance for psychiatric admission 07/05/2024    Grief 07/16/2024    Major depressive disorder, recurrent, severe without psychotic features ([facility]) 07/16/2024     Resolved Ambulatory Problems     Diagnosis Date Noted    Bipolar I disorder, most recent episode depressed, severe without psychotic features ([facility]) 01/09/2018    Allergic rhinitis 01/24/2018    Encntr for gyn exam (general) (routine) w abnormal findings 02/26/2018    Vaginal relaxation 02/26/2018    Routine screening for STI (sexually transmitted infection) 02/26/2018    BMI 50.0-59.9, adult ([facility]) 02/26/2018    Amenorrhea 05/17/2012    RLS (restless legs syndrome) 07/21/2016    Irregular bleeding 08/02/2018    Multiple nevi 09/06/2018    Recurrent sinusitis 11/29/2018    Breast nodule 12/04/2018    Chronic headache 02/28/2019    Acute headache 02/28/2019    Visual field defect 02/28/2019    Encntr for gyn exam (general) (routine) w/o abn findings 03/06/2020    Class 3 severe obesity with body mass index (BMI) of 60.0 to 69.9 in adult ([facility]) 03/06/2020    Volume overload 10/16/2021    Chronic ethmoidal sinusitis 03/14/2023    Chronic maxillary sinusitis 03/14/2023    Depression 08/07/2023     Past Medical History:   Diagnosis Date    Allergic     Anorexia nervosa in remission     Anxiety     Arthritis 5/8/2014    Back pain     Bipolar disorder ([facility])     Disease of thyroid gland     Diverticulitis of colon 9/20/2015    Dizziness     Ear problems     Eating disorder     GERD (gastroesophageal reflux disease) 8/15/2013    Headache(784.0)     Headache, tension-type     Hypothyroid     Idiopathic intracranial hypertension     Migraine     Nasal congestion     Nosebleed     Obesity     Otitis media     Psychiatric disorder     Psychiatric illness     Restless leg syndrome, controlled     Seasonal allergies     Sinusitis     Sleep apnea     Sleep difficulties     Suicide and  self-inflicted injury (HCC)     Tinnitus     TMJ dysfunction     Tonsillitis     Transcranial Magnetic Stimulation 09/06/2021    Urinary tract infection     Vision loss      Family History   Problem Relation Age of Onset    Mental illness Mother     Depression Mother     Suicide Attempts Mother     Hypertension Mother     Diabetes Mother     Other Mother         bicuspid aortic valve    Anxiety disorder Mother     Psychiatric Illness Mother     Schizoaffective Disorder  Mother     Anemia Mother     Hypertension Father     Hypothyroidism Father     Thyroid disease Father     Hypertension Brother     Cancer Maternal Grandmother     Heart disease Maternal Grandmother     Stroke Maternal Grandmother     Breast cancer Maternal Grandmother     Skin cancer Maternal Grandmother     Arthritis Maternal Grandmother     Pneumonia Maternal Grandfather     Cancer Maternal Grandfather     Dementia Maternal Grandfather     COPD Maternal Grandfather     Cancer Paternal Grandmother     Pneumonia Paternal Grandfather     Arthritis Family     Breast cancer Family     Osteopenia Family     Osteoporosis Family     Hypertension Family     Transient ischemic attack Family      Social History     Socioeconomic History    Marital status: Single     Spouse name: Not on file    Number of children: Not on file    Years of education: Not on file    Highest education level: Not on file   Occupational History    Occupation: DISABLED   Tobacco Use    Smoking status: Never     Passive exposure: Never    Smokeless tobacco: Never   Vaping Use    Vaping status: Never Used   Substance and Sexual Activity    Alcohol use: Never    Drug use: Never    Sexual activity: Not Currently     Partners: Female     Comment: No STDs   Other Topics Concern    Not on file   Social History Narrative    Always uses seat belts    Caffeine use     Disabled - permament disability since 2008 due to psychiatric illness    Has no children    Single     Tea     Social  Determinants of Health     Financial Resource Strain: Low Risk  (7/5/2024)    Overall Financial Resource Strain (CARDIA)     Difficulty of Paying Living Expenses: Not very hard   Food Insecurity: No Food Insecurity (7/5/2024)    Hunger Vital Sign     Worried About Running Out of Food in the Last Year: Never true     Ran Out of Food in the Last Year: Never true   Transportation Needs: No Transportation Needs (7/5/2024)    PRAPARE - Transportation     Lack of Transportation (Medical): No     Lack of Transportation (Non-Medical): No   Physical Activity: Not on file   Stress: Not on file   Social Connections: Socially Integrated (10/30/2023)    Received from [facility]     How often do you feel lonely or isolated from those around you?: Never   Intimate Partner Violence: Not At Risk (7/5/2024)    Humiliation, Afraid, Rape, and Kick questionnaire     Fear of Current or Ex-Partner: No     Emotionally Abused: No     Physically Abused: No     Sexually Abused: No   Housing Stability: Low Risk  (7/5/2024)    Housing Stability Vital Sign     Unable to Pay for Housing in the Last Year: No     Number of Times Moved in the Last Year: 1     Homeless in the Last Year: No     Allergies   Allergen Reactions    Doxycycline      Pt refuses d/t remote H/O intracranial hypertension     Other Other (See Comments)     Seasonal allergies          Mental Status Evaluation:    Appearance:  age appropriate   Behavior:  cooperative   Speech:  normal rate and volume   Mood:  depressed   Affect:  constricted   Thought Process:  goal directed   Associations: intact associations   Thought Content:  no overt delusions, negative thinking, ruminations   Perceptual Disturbances: none   Risk Potential: Suicidal ideation - Yes, fleeting suicidal thoughts  Homicidal ideation - None   Sensorium:  oriented to person, place, and time/date   Memory:  recent and remote memory grossly intact   Consciousness:  alert and awake   Attention:  attention span and concentration are age appropriate   Insight:  fair   Judgment: fair   Gait/Station: unable to assess   Motor Activity: unable to assess today due to virtual visit       Laboratory results: I have personally reviewed all pertinent laboratory/tests results.      Assessment   Diagnoses and all orders for this visit:    Severe episode of recurrent major depressive disorder, without psychotic features (HCC)    EILEEN (generalized anxiety disorder)    Grief       Current Outpatient Medications   Medication Sig Dispense Refill    acetaZOLAMIDE (DIAMOX) 250 mg tablet Take 2 tablets (500 mg total) by mouth 2 (two) times a day 120 tablet 2    almotriptan (AXERT) 12.5 MG tablet Take 12.5 mg by mouth once as needed for migraine may repeat in 2 hours if needed.  Limited to 2 x a week      Atogepant (Qulipta) 60 MG TABS Take 60 mg by mouth in the morning      cariprazine (VRAYLAR) 6 MG capsule Take 1 capsule (6 mg total) by mouth daily 30 capsule 0    celecoxib (CeleBREX) 200 mg capsule Take 1 capsule (200 mg total) by mouth 2 (two) times a day 60 capsule 0    Cequa 0.09 % SOLN Apply 1 drop to eye 2 (two) times a day 60 each 0    cetirizine (ZyrTEC) 10 MG chewable tablet Chew 10 mg daily      cholecalciferol 1,000 units tablet Take 5 tablets (5,000 Units total) by mouth daily 150 tablet 0    DULoxetine (CYMBALTA) 60 mg delayed release capsule Take 1 capsule (60 mg total) by mouth daily 30 capsule 1    gabapentin (NEURONTIN) 300 mg capsule Take 1 capsule (300 mg total) by mouth daily after breakfast      gabapentin (NEURONTIN) 600 MG tablet Take 1.5 tablets (900 mg total) by mouth daily at bedtime 45 tablet 0    hydrocortisone (ANUSOL-HC) 2.5 % rectal cream Apply topically 2 (two) times a day 30 g 5    ipratropium (ATROVENT) 0.03 % nasal spray 2 sprays into each nostril every 12 (twelve) hours 30 mL 1    levothyroxine 112 mcg tablet Take 1 tablet (112 mcg total) by mouth daily in the early morning 90 tablet 1     linaCLOtide (Linzess) 290 MCG CAPS Take 1 capsule by mouth daily 90 capsule 1    metFORMIN (GLUCOPHAGE) 500 mg tablet Take 1 tablet (500 mg total) by mouth daily with breakfast 90 tablet 1    Mirabegron ER 25 MG TB24 Take 25 mg by mouth daily 30 tablet 5    Multiple Vitamin (MULTIVITAMIN) capsule Take 1 capsule by mouth daily      omeprazole (PriLOSEC) 20 mg delayed release capsule Take 1 capsule (20 mg total) by mouth 2 (two) times a day 180 capsule 1    prochlorperazine (COMPAZINE) 10 mg tablet Take 10 mg by mouth every 6 (six) hours as needed for nausea or vomiting Take 1 tablet by TID if needed for headache or nausea. Limit to 2x a week .      propranolol (INDERAL) 10 mg tablet Take 1 tablet (10 mg total) by mouth in the morning      Semaglutide-Weight Management (WEGOVY) 1.7 MG/0.75ML Inject 0.75 mL (1.7 mg total) under the skin once a week 3 mL 1    spironolactone (ALDACTONE) 25 mg tablet Take 1 tablet (25 mg total) by mouth daily 30 tablet 1    topiramate (TOPAMAX) 100 mg tablet TAKE 1.5 TABLETS BID       No current facility-administered medications for this visit.         Plan    Planned medication and treatment changes:  Need to discuss further in treatment team.  Polypharmacy is an issue.  No med change- cont cymbalta 60 mg/d, neurontin 300/900 mg, vraylar 6 mg/d.  Stop inderal 10 mg qam- secondary to no therapeutic benefit, polypharmacy and unable to tolerate higher dose.  Is also on topamax 150 mg bid (urology), wegovy (weight management), diamox (neuro) among others.     All current active medications have been reviewed.  Continue treatment with group therapy and partial program      Risks / Benefits of Treatment:    Risks, benefits, and possible side effects of medications explained to patient and patient verbalizes understanding and agrees to medications.     Counseling / Coordination of Care:    Patient's progress discussed with staff in treatment team meeting.  Medications, treatment progress and  treatment plan reviewed with patient.    Sary Kenney PA-C 08/01/24

## 2024-08-01 NOTE — PSYCH
"Visit Time    Visit Start Time: 1215  Visit Stop Time: 1315  Total Visit Duration:  60 minutes    Subjective:     Patient ID: Gita Rendon is a 38 y.o. female.    [facility] Clinical Progress Notes      Specialized Services Documentation  Therapist must complete separate progress note for each specific clinical activity in which the individual participated during the day.       Group Psychotherapy  This group was facilitated virtually in a private office using HIPAA Compliant and Approved [employer] Teams.  Gita Rendon consented to the use of tele-video modality of treatment.  ([id number]) Gita Rendon actively participated in psychoeducation group this afternoon which focused on sleep hygiene.  Group then identified sleep hygiene habits that are currently effective and habits they wish to incorporate into their night time routine to promote sleep hygiene.  This writer explained the importance of quality sleep in relation to wellness.  Sleep diary and additional tips on sleep hygiene were discussed.  Then, a video titled \"How to Improve Your Sleep\" was viewed. Some progress toward goal observed.  Continue psychoeducation group to increase awareness of good sleeping habits to promote wellness.      Tx Plan Objectives: 1.1, 1.2      Juliet PROCTOR RN        "

## 2024-08-02 ENCOUNTER — TELEMEDICINE (OUTPATIENT)
Dept: PSYCHOLOGY | Facility: CLINIC | Age: 39
End: 2024-08-02
Payer: COMMERCIAL

## 2024-08-02 DIAGNOSIS — F41.1 GAD (GENERALIZED ANXIETY DISORDER): ICD-10-CM

## 2024-08-02 DIAGNOSIS — F43.21 GRIEF: ICD-10-CM

## 2024-08-02 DIAGNOSIS — F33.2 MAJOR DEPRESSIVE DISORDER, RECURRENT, SEVERE WITHOUT PSYCHOTIC FEATURES (HCC): Primary | ICD-10-CM

## 2024-08-02 PROCEDURE — G0177 OPPS/PHP; TRAIN & EDUC SERV: HCPCS

## 2024-08-02 PROCEDURE — G0410 GRP PSYCH PARTIAL HOSP 45-50: HCPCS

## 2024-08-02 NOTE — PSYCH
Visit Time    Visit Start Time: 1045  Visit Stop Time: 1145  Total Visit Duration:  60 minutes    Subjective:     Patient ID: Gita Rendon is a 38 y.o. female.    [facility] Clinical Progress Notes      Specialized Services Documentation  Therapist must complete separate progress note for each specific clinical activity in which the individual participated during the day.     GROUP PSYCHOTHERAPY  ([id number]) Group was facilitated virtually in a private office using HIPAA Compliant and Approved [employer] Teams. Gita Rendon consented to the use of tele-video modality of treatment and was virtually present for group psychotherapy today.  she attentively listened to [facility] Mary share her life story as she co-led this session.  Group encouraged power of learning about self, accepting illness and personal responsibility in recovery.  Community resources reviewed in addition to personal resources like the affirmations.  Progress toward goals noted.  Continue psychotherapy to encourage self -awareness and healthy engagement of supports.    TX Plan Objectives: 1.1, 1.2, 1.4   Therapist: Juliet PROCTOR RN

## 2024-08-02 NOTE — PSYCH
[facility] Discharge Instructions:   Disposition: home  Address: [address] [place] [address] [address]  [facility] [address]   Diagnosis:  Encounter Diagnoses   Name Primary?    Major depressive disorder, recurrent, severe without psychotic features (HCC) Yes    EILEEN (generalized anxiety disorder)     Grief    .   Allergies (Drug/Food):   Allergies   Allergen Reactions    Doxycycline      Pt refuses d/t remote H/O intracranial hypertension     Other Other (See Comments)     Seasonal allergies     Activity: n/a  Diet:no recommendations  Smoking Cessation:not a smoker   Diagnostic/Laboratory Orders: n/a  Vaccines: If you received a vaccine, please notify your family physician on your next visit. For more information, please call [phone].   Follow-up appointments/Referrals:   Psychiatry (8/14/24 @ 11:45 am)  Dr Weston ([facility])  [address] [place] [address]  Fax: [phone]  Ph: [phone]     Therapy (8/6/24 @ 12:00 pm)  Stefani Coates ([facility])  [address] [place] [address]  Fax: [phone]  Ph: [phone]     ICM/CTT:n/a  [facility] [phone].    Intake/Referral/Evaluation (Non-Emergency) *NON INSURED FOR FUNDING: [facility]: [phone], [facility]: [phone], [facility]: [phone] and [facility]: [phone]. Crisis Intervention (Emergency) County Service: [facility]: [phone], [place]: [phone], [employer]: [phone], [facility]: [phone], [facility]: [phone] and [facility]: [phone]. _________________________________  National Crisis Intervention Hotline: 1-862.110.8619.  National Suicide Crisis Hotline: 1-748.983.1501.     I, the undersigned, have received and understand the above instructions.        Due to preference of virtual care, consent given via TEAMS    Patient/Rep Signature: __________________________________       Date/Time: ______________         Physician Signature:  ____________________________________      Date/Time: ______________               Signature: ________________________________       Date/Time: ______________

## 2024-08-02 NOTE — PSYCH
Visit Time    Visit Start Time: 0830  Visit Stop Time: 0930  Total Visit Duration:  60 minutes    Subjective:     Patient ID: Gita Rendon is a 38 y.o. female.    [facility] Clinical Progress Notes      Specialized Services Documentation  Therapist must complete separate progress note for each specific clinical activity in which the individual participated during the day.       EDUCATION THERAPY      Gita Rendon quietly shared in morning assessment and goal review. Presented as Receptive related to readiness to learn. Gita Rendon  did complete goal from last treatment day identifying she ate dinner, gaining responsibility. Gita Rendon did present with any barriers to learning, Elizabeth spoke of having a poor conversation with her aunt the previous night. Elizabeth looks to her aunt for support, and felt that the support was lacking during her conversation with her aunt. Throughout morning group, Gita Rendon participated in mindfulness exercise. Gita Rendon made limited progress toward goal. Continue education group to assess willingness to engage, assess transfer of knowledge, and participate in skill development.    Tx Plan Objective: 1.1, 1.2, 1.3, 1.4 , Therapist: Noam Sandhu    Visit Time    Visit Start Time: 1330  Visit Stop Time: 1430  Total Visit Duration:  60 minutes    Subjective:     Patient ID: Gita Rendon is a 38 y.o. female.    [facility] Clinical Progress Notes      Specialized Services Documentation  Therapist must complete separate progress note for each specific clinical activity in which the individual participated during the day.       GROUP PSYCHOTHERAPY    Gita Rendon participated actively in psychotherapy group.  This was observed Consistent throughout the treatment day. They were engaged in learning related to Wellness Tools. Staff utilized Demonstration teaching methods.  Gita Rendon shared area of learning and set a goal for outside of program to  call her aunt and have a conversation with her using dear yariel..  Gita Rendon was able to share items explored during the day and shared in adding to their WRAP.  Ended session with peer  led exercise on breathing techniques.  Gita Rendon demonstrated positive progress toward goal.  Continue group psychotherapy to actively practice learned skills and encourage transfer of knowledge to unstructured time.    Tx Plan Objective: 1.1, 1.2, 1.3, 1.4 , Therapist: Noam Sandhu    Group Psychotherapy    Visit Start Time: (930)  Visit Stop Time: (1030)  Total Visit Duration:  60 minutes      Subjective:     Patient ID: Gita Rendon 38 y.o.        This group was facilitated virtually in a private office using HIPAA Compliant and Approved [employer] Teams.  Gita Rendon actively engaged in psychoeducational group about challenging automatic negative thoughts negative thoughts that are perceived globally, external factors that influence an individuals thoughts and behaviors.(ANTs).The skill helps to identify negative thoughts and cognitive distortions. The outcome being that negative thoughts are challenged in the thought process and regulation of emotion. Group discovered strategies that help them to manage negative thoughts and rethink the negative thoughts when faced with a negative challenge. The group talked about understanding the purpose of challenging negative thoughts on a personal and social level and how it affects themselves and others.. Teaching on the emphasis of self monitoring and self-care, the group focus is geared how togo to for help when the negative thoughts become overly intrusive. Group was encouraged to ask questions in an open forum at the end of session. Limited progress displayed through engagement in topic, Elizabeth did not actively engage in the group discussion and only listened during the group session. Gita Rendon will continue to engage in psychotherapy to encourage positive  self realization.  Treatment Plan Objective 1.1, 1.2, 1.3, 1.4 Therapist: Noam MILLER Ed.

## 2024-08-02 NOTE — PSYCH
Subjective:     Patient ID: Gita Rendon is a 38 y.o. female.    [facility] Clinical Progress Notes      Specialized Services Documentation  Therapist must complete separate progress note for each specific clinical activity in which the individual participated during the day.     Case Management Note    MAXIMO Chairez, MT-BC    Current suicide risk : Low     No case management requested or scheduled. Aware of next 1:1    Medications changes/added/denied? No    Treatment session number: 11    Individual Case Management Visit provided today? No    [facility] follow up physician's orders: None at this time.

## 2024-08-02 NOTE — PSYCH
Subjective:     Patient ID: Elizabeth Rendon is a 38 y.o. female.     [facility] Clinical Progress Notes      Specialized Services Documentation  Therapist must complete separate progress note for each specific clinical activity in which the individual participated during the day.       Group Psychotherapy - Open processing    [id number]   Total Visit Duration: 60 minutes    Elizabeth Rendon participated actively in a psychotherapy group focused on open processing. This group was facilitated virtually in a private office using HIPAA compliant and approved [employer] teams. Gita Rendon consented to the use of tele-video modality of treatment.  This writer let an open processing discussion on setting boundaries with hammad and self sabotaging behvaiors. Group members had the opportunity to reflect on the topics ans share their experience and develop their skills. This writer encouraged the group members to use the supplemental materials inside and outside of the program. Elizabeth Rendon made good efforts towards progress goals which were displayed through participation and engagement in topic. Elizabeth Rendon shared her hammad plan and applied it to a boundary she would like to set with her aunt.  Continue to run daily group psychotherapy to meet treatment needs and encourage Elizabeth Rendon to practice skills outside of program.      Tx Plan Objective: 1.1,1.2,1.4 Therapist: ARACELY Pate

## 2024-08-05 ENCOUNTER — TELEMEDICINE (OUTPATIENT)
Dept: PSYCHOLOGY | Facility: CLINIC | Age: 39
End: 2024-08-05
Payer: COMMERCIAL

## 2024-08-05 ENCOUNTER — TELEMEDICINE (OUTPATIENT)
Dept: PSYCHIATRY | Facility: CLINIC | Age: 39
End: 2024-08-05
Payer: COMMERCIAL

## 2024-08-05 DIAGNOSIS — F41.1 GAD (GENERALIZED ANXIETY DISORDER): ICD-10-CM

## 2024-08-05 DIAGNOSIS — F33.2 MAJOR DEPRESSIVE DISORDER, RECURRENT, SEVERE WITHOUT PSYCHOTIC FEATURES (HCC): Primary | ICD-10-CM

## 2024-08-05 DIAGNOSIS — F43.21 GRIEF: ICD-10-CM

## 2024-08-05 DIAGNOSIS — F43.10 PTSD (POST-TRAUMATIC STRESS DISORDER): ICD-10-CM

## 2024-08-05 DIAGNOSIS — F33.2 SEVERE EPISODE OF RECURRENT MAJOR DEPRESSIVE DISORDER, WITHOUT PSYCHOTIC FEATURES (HCC): Primary | ICD-10-CM

## 2024-08-05 PROCEDURE — G0177 OPPS/PHP; TRAIN & EDUC SERV: HCPCS

## 2024-08-05 PROCEDURE — 90832 PSYTX W PT 30 MINUTES: CPT

## 2024-08-05 PROCEDURE — G0410 GRP PSYCH PARTIAL HOSP 45-50: HCPCS

## 2024-08-05 PROCEDURE — 99213 OFFICE O/P EST LOW 20 MIN: CPT | Performed by: PHYSICIAN ASSISTANT

## 2024-08-05 NOTE — PSYCH
"Assessment/Plan:         Assessment  Diagnoses and all orders for this visit:     Major depressive disorder, recurrent, severe without psychotic features (HCC)     EILEEN (generalized anxiety disorder)              Subjective:        Subjective  Patient ID: Gita Rendon is a 38 y.o. female.     [facility] Treatment Plan   AREAS OF NEED: depression and anxiety as evidenced by poor appetite, loss of interests, poor motivation, isolation, crying \"all the time\", excess sleep, anxious thoughts, worrying about everything, low mood. Stressors of Dad's passing in 2021, mother's declining health (mental and physical), stepfathers passing, breakup with Girlfriend and continuing to live with her, feeling isolated with minimal support, and a recent bankruptcy filing.   Date Initiated: 07/16/24     Strengths: \"I am resilient, confident and humorous\"              LONG TERM GOAL:   Date Initiated: 07/16/24  1.0 I will identify 3 signs that my depression and anxiety have improved since starting program.   Target Date: 8/13/24  Completion Date: 08/05/24        SHORT TERM OBJECTIVES:      Date Initiated: 07/16/24  1.1 I create and follow a daily routine that prioritizes self care and interacting with others to begin building structure and purpose into my day.   Revision Date: 7/25/24  Target Date: 7/25/24  Completion Date: 08/05/24     Date Initiated: 07/16/24  1.2 I will identify at least 1 support group I would like to attend and attend at least 1 meeting prior to discharge.   Revision Date: 7/25/24  Target Date: 7/25/24  Completion Date: 08/05/24     Date Initiated: 07/16/24  1.3 I will take medication as prescribed and share questions and concerns if they arise.  Revision Date:7/25/24  Target Date: 7/25/24  Completion Date:  08/05/24     Date Initiated: 07/16/24  1.4 I will identify 3 ways my supports can assist in my recovery and agree to staff/support contact as indicated.   Revision Date:7/25/24  Target Date: " 7/25/24  Completion Date: 08/05/24             7 DAY REVISION:   1.5 I will spend 20 minutes twice a week researching information on residential treatment in order to make an informed decision with my living situation.   Date Initiated:7/25/24  Revision Date:   Target Date: 8/5/24  Completion Date: 08/05/24        PSYCHIATRY:  Date Initiated: 07/16/24  Medication Management and Education       Revision Date: 07/25/24        1.3 Continue medication management   Completion Date: 08/05/24  The person(s) responsible for carrying out the plan is Christiano Taveras DO; Sary Kenney PA-C     NURSING/SYMPTOM EDUCATION:  Date Initiated: 07/16/24  1.1, 1.2. 1.3, 1.4 This RN and/or Therapist will provide wellness/symptoms and skill education groups three to five days weekly to educate Gita Rendon on signs and symptoms of diagnoses, skills to manage, and medication questions that will be addressed by the treatment team.    Revision date: 07/25/24   1.1,1.2,1.3,1.4,1.5 Continue to encourage Gita Rendon to participate in wellness/symptoms and skills education groups daily to learn about symptoms, coping strategies and warning signs to promote relapse prevention.    Completion Date: 08/05/24  The person(s) responsible for carrying out the plan is Juliet Zamorano RN, ARACELY Pate; LYLA Sanchez ED     PSYCHOLOGY:   Date Initiated: 07/16/24       1.1, 1.2, 1.4 Provide psychotherapy group 5 times per week to allow opportunity for Gita Rendon  to explore stressors and ways of coping.   Revision Date: 07/25/24   1.1,1.2,1.4,1.5  Continue to provide psychotherapy group daily to Gita Rendon and encourage sharing of stressors, skills and positive change.   Completion Date: 08/05/24  The person(s) responsible for carrying out the plan is ISA Matute, LSW; Ashley Costenbader, MA LMT     ALLIED THERAPY:   Date Initiated: 07/16/24  1.1,1.2 Engage Gita Rendon in AT group 3-5 times per week  to encourage development and use of wellness tools to decrease symptoms and promote recovery through meaningful activity.  Revision Date: 07/25/24   1.1,1.2,1.5 Continue to engage Gita Rendon to participate in AT group to practice wellness tools within program and transfer to home sharing successes and barriers through healthy task involvement.   Completion Date: 08/05/24  The person(s) responsible for carrying out the plan is JHONATAN Scott; MAXIMO Chairez MT-BC     CASE MANAGEMENT:   Date Initiated: 07/16/24      1.0 This  will meet with Gita Rendon  3-4 times weekly to assess treatment progress, discharge planning, connection to community supports and UR as indicated.  Revision Date: 07/25/24   1.0 Continue to meet with Gita Rendon 3-4 times weekly to assess growth, work toward goals, continued treatment needs, dc planning and use of supports.   Completion Date: 08/05/24  The person(s) responsible for carrying out the plan is MAXIMO Chairez MT-BC        TREATMENT REVIEW/COMMENTS:      DISCHARGE CRITERIA:Identify 3 signs of progress and complete relapse prevention plan.   DISCHARGE PLAN: Discharge to OP therapy and psychiatry  Estimated Length of Stay: 10 treatment days               Diagnosis and Treatment Plan explained to Gita Pelayo relates understanding diagnosis and is agreeable to Treatment Plan.            CLIENT COMMENTS / Please share your thoughts, feelings, need and/or experiences regarding your treatment plan with Staff.  Please see follow up note with comments.        Signatures can be found on [facility] Treatment plan consent form.

## 2024-08-05 NOTE — PSYCH
Visit Time    Visit Start Time: 0830  Visit Stop Time: 0930  Total Visit Duration:  60 minutes    Subjective:     Patient ID: Gita Rendon is a 38 y.o. female.    [facility] Clinical Progress Notes      Specialized Services Documentation  Therapist must complete separate progress note for each specific clinical activity in which the individual participated during the day.       EDUCATION THERAPY      Gita Rendon quietly shared in morning assessment and goal review. Presented as Receptive related to readiness to learn. Gita Rendon  did complete goal from last treatment day identifying, she was able to call her aunt over the weekend and have a positive conversation with her, gaining responsibility, support, and advocacy. Gita Rendon did not present with any barriers to learning. Throughout morning group, Gita Rendon participated in mindfulness exercise. Gita Rendon made positive progress toward goal. Continue education group to assess willingness to engage, assess transfer of knowledge, and participate in skill development.    Tx Plan Objective: 1.1, 1.2, 1.3, 1.4 , Therapist: Noam Putnam [facility]      Visit Start Time: (930)  Visit Stop Time: (1030)  Total Visit Duration:  45 minutes    Subjective:     Patient ID: Gita Rendon 38 y.o.      This group was facilitated virtually in a private office using HIPAA Compliant and Approved [employer] Teams.  Gita Rendon was in a medication meeting for a portion of this session. Gita was engaged in psychoeducational group about employment stressors. The skill helps to create effective work relationships. The group discovered strategies that help them to manage work relationships on a short term and long term basis. The group talked about understanding the purpose, and meaning of work stressors  in intellectual and emotional expression, regulation , and recognition, and how it affects themselves and others.. Teaching  on the emphasis of self monitoring , suggestive solutions, verbal and non-verbal communication,and keeping commitments, strategies were discusses to reduce work stressors.  Group was encouraged to ask questions in an open forum at the end of group. Adequate progress displayed through engagement in topic, Gita was an active listener, but did not actively engage in the group discussion. Gita CALHOUN Justice will continue to engage in psychotherapy to encourage positive self realization.  Treatment Plan Objective 1.1, 1.2, 1.3, 1.4 Therapist: Noam MILLER Ed.
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
## 2024-08-05 NOTE — PSYCH
Virtual Regular Visit    Verification of patient location:    Patient is located at Home in the following state in which I hold an active license [place]             Reason for visit is   Chief Complaint   Patient presents with    Virtual Regular Visit          Encounter provider Sary Kenney PA-C      Recent Visits  Date Type Provider Dept   08/01/24 Telemedicine Sary Kenney PA-C [facility]   Showing recent visits within past 7 days and meeting all other requirements  Today's Visits  Date Type Provider Dept   08/05/24 Telemedicine Sary Kenney PA-C [facility]   Showing today's visits and meeting all other requirements  Future Appointments  No visits were found meeting these conditions.  Showing future appointments within next 150 days and meeting all other requirements       The patient was identified by name and date of birth. Gita Rendon was informed that this is a telemedicine visit and that the visit is being conducted throughthe [employer] Teams platform. She agrees to proceed..  My office door was closed. No one else was in the room.  She acknowledged consent and understanding of privacy and security of the video platform. The patient has agreed to participate and understands they can discontinue the visit at any time.    Patient is aware this is a billable service.                  Progress Note - [facility] [place] [facility]  Gita Rendon 38 y.o. female MRN: [id number]   This note was not shared with the patient due to this is a psychotherapy note    Progress at partial program: some improvement.      Chart reviewed.  Elizabeth seen by [facility] 8/1 at which time meds unchanged.  Elizabeth scheduled for d/c today having achieved max benefit form program. Says she had a better day yesterday- more motivated, more productive, attended to ADLs and went grocery shopping.  Says she called some people and didn;t feel overwhelmingly depressed.   Feels  program has been beneficial, peg helping her establish a routine.   Reports chronic and ongoing fleeting SI without intent or plan.  No urges to self-injure. Has f/u with psychiatrist, therapist. Says [facility] planning to refer her to peer specialist and psych rehab.       ROS:   As above otherwise negative    Active Ambulatory Problems     Diagnosis Date Noted    Obsessive-compulsive disorder 01/24/2018    Hypertension 01/24/2018    Hypothyroidism 01/24/2018    Overactive bladder 01/24/2018    Binge-eating disorder, severe 09/09/2016    Gambling disorder, moderate 06/15/2015    Dysfunction of both eustachian tubes 05/20/2018    Bulging lumbar disc 05/08/2014    Esophageal reflux 08/15/2013    EILEEN (generalized anxiety disorder) 11/18/2015    Lumbar degenerative disc disease 05/08/2014    Morbid obesity (HCC) 09/25/2014    Nocturnal enuresis 05/16/2017    PTSD (post-traumatic stress disorder) 10/12/2017    Urgency incontinence 07/06/2017    Vitamin D deficiency 01/15/2014    Chronic midline low back pain without sciatica 07/17/2018    Iliotibial band syndrome of left side 09/04/2018    Piriformis syndrome of left side 09/04/2018    Skin lesion 09/06/2018    Prediabetes 11/29/2018    Family history of cancer 01/22/2019    Intracranial hypertension 02/28/2019    Chronic migraine without aura without status migrainosus, not intractable 03/04/2019    Chronic tension-type headache, not intractable 03/04/2019    Menorrhagia with irregular cycle 12/30/2019    Encounter for insertion of mirena IUD 02/10/2020    CAIO (obstructive sleep apnea)     IUD check up 03/06/2020    Cervicalgia 01/06/2021    Numbness and tingling in both hands 09/24/2021    Dermatitis 10/16/2021    Chronic constipation 10/16/2021    Thyroid disease neuropathy (HCC) 03/19/2022    Encounter for medication monitoring 07/06/2022    Hypervolemia 07/06/2022    Migraine headache 08/22/2022    Hyperaldosteronism (HCC) 09/16/2022    Persistent proteinuria 01/05/2023     Xerophthalmia 09/21/2023    Xerostomia 09/21/2023    Bilateral lower extremity edema 11/10/2023    Severe episode of recurrent major depressive disorder, without psychotic features ([facility]) 07/05/2024    Medical clearance for psychiatric admission 07/05/2024    Grief 07/16/2024    Major depressive disorder, recurrent, severe without psychotic features ([facility]) 07/16/2024     Resolved Ambulatory Problems     Diagnosis Date Noted    Bipolar I disorder, most recent episode depressed, severe without psychotic features ([facility]) 01/09/2018    Allergic rhinitis 01/24/2018    Encntr for gyn exam (general) (routine) w abnormal findings 02/26/2018    Vaginal relaxation 02/26/2018    Routine screening for STI (sexually transmitted infection) 02/26/2018    BMI 50.0-59.9, adult ([facility]) 02/26/2018    Amenorrhea 05/17/2012    RLS (restless legs syndrome) 07/21/2016    Irregular bleeding 08/02/2018    Multiple nevi 09/06/2018    Recurrent sinusitis 11/29/2018    Breast nodule 12/04/2018    Chronic headache 02/28/2019    Acute headache 02/28/2019    Visual field defect 02/28/2019    Encntr for gyn exam (general) (routine) w/o abn findings 03/06/2020    Class 3 severe obesity with body mass index (BMI) of 60.0 to 69.9 in adult ([facility]) 03/06/2020    Volume overload 10/16/2021    Chronic ethmoidal sinusitis 03/14/2023    Chronic maxillary sinusitis 03/14/2023    Depression 08/07/2023     Past Medical History:   Diagnosis Date    Allergic     Anorexia nervosa in remission     Anxiety     Arthritis 5/8/2014    Back pain     Bipolar disorder ([facility])     Disease of thyroid gland     Diverticulitis of colon 9/20/2015    Dizziness     Ear problems     Eating disorder     GERD (gastroesophageal reflux disease) 8/15/2013    Headache(784.0)     Headache, tension-type     Hypothyroid     Idiopathic intracranial hypertension     Migraine     Nasal congestion     Nosebleed     Obesity     Otitis media     Psychiatric disorder     Psychiatric illness      Restless leg syndrome, controlled     Seasonal allergies     Sinusitis     Sleep apnea     Sleep difficulties     Suicide and self-inflicted injury (HCC)     Tinnitus     TMJ dysfunction     Tonsillitis     Transcranial Magnetic Stimulation 09/06/2021    Urinary tract infection     Vision loss      Family History   Problem Relation Age of Onset    Mental illness Mother     Depression Mother     Suicide Attempts Mother     Hypertension Mother     Diabetes Mother     Other Mother         bicuspid aortic valve    Anxiety disorder Mother     Psychiatric Illness Mother     Schizoaffective Disorder  Mother     Anemia Mother     Hypertension Father     Hypothyroidism Father     Thyroid disease Father     Hypertension Brother     Cancer Maternal Grandmother     Heart disease Maternal Grandmother     Stroke Maternal Grandmother     Breast cancer Maternal Grandmother     Skin cancer Maternal Grandmother     Arthritis Maternal Grandmother     Pneumonia Maternal Grandfather     Cancer Maternal Grandfather     Dementia Maternal Grandfather     COPD Maternal Grandfather     Cancer Paternal Grandmother     Pneumonia Paternal Grandfather     Arthritis Family     Breast cancer Family     Osteopenia Family     Osteoporosis Family     Hypertension Family     Transient ischemic attack Family      Social History     Socioeconomic History    Marital status: Single     Spouse name: Not on file    Number of children: Not on file    Years of education: Not on file    Highest education level: Not on file   Occupational History    Occupation: DISABLED   Tobacco Use    Smoking status: Never     Passive exposure: Never    Smokeless tobacco: Never   Vaping Use    Vaping status: Never Used   Substance and Sexual Activity    Alcohol use: Never    Drug use: Never    Sexual activity: Not Currently     Partners: Female     Comment: No STDs   Other Topics Concern    Not on file   Social History Narrative    Always uses seat belts    Caffeine use      Disabled - permament disability since 2008 due to psychiatric illness    Has no children    Single     Tea     Social Determinants of Health     Financial Resource Strain: Low Risk  (7/5/2024)    Overall Financial Resource Strain (CARDIA)     Difficulty of Paying Living Expenses: Not very hard   Food Insecurity: No Food Insecurity (7/5/2024)    Hunger Vital Sign     Worried About Running Out of Food in the Last Year: Never true     Ran Out of Food in the Last Year: Never true   Transportation Needs: No Transportation Needs (7/5/2024)    PRAPARE - Transportation     Lack of Transportation (Medical): No     Lack of Transportation (Non-Medical): No   Physical Activity: Not on file   Stress: Not on file   Social Connections: Socially Integrated (10/30/2023)    Received from [facility]     How often do you feel lonely or isolated from those around you?: Never   Intimate Partner Violence: Not At Risk (7/5/2024)    Humiliation, Afraid, Rape, and Kick questionnaire     Fear of Current or Ex-Partner: No     Emotionally Abused: No     Physically Abused: No     Sexually Abused: No   Housing Stability: Low Risk  (7/5/2024)    Housing Stability Vital Sign     Unable to Pay for Housing in the Last Year: No     Number of Times Moved in the Last Year: 1     Homeless in the Last Year: No     Allergies   Allergen Reactions    Doxycycline      Pt refuses d/t remote H/O intracranial hypertension     Other Other (See Comments)     Seasonal allergies          Mental Status Evaluation:    Appearance:  age appropriate, casually dressed   Behavior:  cooperative   Speech:  normal rate and volume   Mood:  low   Affect:  constricted   Thought Process:  goal directed   Associations: intact associations   Thought Content:  negative thinking   Perceptual Disturbances: none   Risk Potential: Suicidal ideation - Yes, fleeting suicidal thoughts  Homicidal ideation - None   Sensorium:  oriented to person, place, and time/date    Memory:  recent and remote memory grossly intact   Consciousness:  alert and awake   Attention: attention span and concentration are age appropriate   Insight:  fair   Judgment: intact   Gait/Station: unable to assess   Motor Activity: unable to assess today due to virtual visit       Laboratory results: I have personally reviewed all pertinent laboratory/tests results.      Assessment   Diagnoses and all orders for this visit:    Severe episode of recurrent major depressive disorder, without psychotic features (HCC)    PTSD (post-traumatic stress disorder)    Grief       Current Outpatient Medications   Medication Sig Dispense Refill    acetaZOLAMIDE (DIAMOX) 250 mg tablet Take 2 tablets (500 mg total) by mouth 2 (two) times a day 120 tablet 2    almotriptan (AXERT) 12.5 MG tablet Take 12.5 mg by mouth once as needed for migraine may repeat in 2 hours if needed.  Limited to 2 x a week      Atogepant (Qulipta) 60 MG TABS Take 60 mg by mouth in the morning      cariprazine (VRAYLAR) 6 MG capsule Take 1 capsule (6 mg total) by mouth daily 30 capsule 0    celecoxib (CeleBREX) 200 mg capsule Take 1 capsule (200 mg total) by mouth 2 (two) times a day 60 capsule 0    Cequa 0.09 % SOLN Apply 1 drop to eye 2 (two) times a day 60 each 0    cetirizine (ZyrTEC) 10 MG chewable tablet Chew 10 mg daily      cholecalciferol 1,000 units tablet Take 5 tablets (5,000 Units total) by mouth daily 150 tablet 0    DULoxetine (CYMBALTA) 60 mg delayed release capsule Take 1 capsule (60 mg total) by mouth daily 30 capsule 1    gabapentin (NEURONTIN) 300 mg capsule Take 1 capsule (300 mg total) by mouth daily after breakfast      gabapentin (NEURONTIN) 600 MG tablet Take 1.5 tablets (900 mg total) by mouth daily at bedtime 45 tablet 0    hydrocortisone (ANUSOL-HC) 2.5 % rectal cream Apply topically 2 (two) times a day 30 g 5    ipratropium (ATROVENT) 0.03 % nasal spray 2 sprays into each nostril every 12 (twelve) hours 30 mL 1     levothyroxine 112 mcg tablet Take 1 tablet (112 mcg total) by mouth daily in the early morning 90 tablet 1    linaCLOtide (Linzess) 290 MCG CAPS Take 1 capsule by mouth daily 90 capsule 1    metFORMIN (GLUCOPHAGE) 500 mg tablet Take 1 tablet (500 mg total) by mouth daily with breakfast 90 tablet 1    Mirabegron ER 25 MG TB24 Take 25 mg by mouth daily 30 tablet 5    Multiple Vitamin (MULTIVITAMIN) capsule Take 1 capsule by mouth daily      omeprazole (PriLOSEC) 20 mg delayed release capsule Take 1 capsule (20 mg total) by mouth 2 (two) times a day 180 capsule 1    prochlorperazine (COMPAZINE) 10 mg tablet Take 10 mg by mouth every 6 (six) hours as needed for nausea or vomiting Take 1 tablet by TID if needed for headache or nausea. Limit to 2x a week .      Semaglutide-Weight Management (WEGOVY) 1.7 MG/0.75ML Inject 0.75 mL (1.7 mg total) under the skin once a week 3 mL 1    spironolactone (ALDACTONE) 25 mg tablet Take 1 tablet (25 mg total) by mouth daily 30 tablet 1    topiramate (TOPAMAX) 100 mg tablet TAKE 1.5 TABLETS BID       No current facility-administered medications for this visit.         Plan    Planned medication and treatment changes:  No meds changed and no meds prescribed.  Will cont vraylar 6 mg/d, cymbalta 60 mg/d, neurontin 300/900 mg.     All current active medications have been reviewed.  Discharge planning      Risks / Benefits of Treatment:    Risks, benefits, and possible side effects of medications explained to patient and patient verbalizes understanding and agrees to medications.     Counseling / Coordination of Care:    Patient's progress discussed with staff in treatment team meeting.  Medications, treatment progress and treatment plan reviewed with patient.    Sary Kenney PA-C 08/05/24

## 2024-08-05 NOTE — PSYCH
Subjective:     Patient ID: Gita Rendon is a 38 y.o. female.    [facility] Clinical Progress Notes      Specialized Services Documentation  Therapist must complete separate progress note for each specific clinical activity in which the individual participated during the day.     Case Management Note    MAXIMO Chairez MT-BC    Current suicide risk : Low     [id number] Met with Gita Rendon. Reviewed relapse prevention plan, aftercare plan, and medication list (copies provided). Gita Rendon shared improvement through better knowledge and use of coping skills, and increased motivation to find further follow up care. Denied SI, HI, and psychosis. Aftercare providers to receive summary.     I,Gita Rendon,am physically unable to provide a signature; however, I understand the nature of and am in agreement with the documentation presented to me via TEAMS.  I have received a copy through My Chart and/or the [employer] Postal Service.  I freely give verbal consent.  Name of Document (s): dc instructions  Witness to verbal consent: MAXIMO Chairez MT-BC  Witness to verbal consent: Gita Vallejo    Medications changes/added/denied? No    Treatment session number: 12    Individual Case Management Visit provided today? Yes     [facility] follow up physician's orders: Discharge to OP therapy and psychiatry

## 2024-08-05 NOTE — PSYCH
Visit Time    Visit Start Time: 1045  Visit Stop Time: 1145  Total Visit Duration:  60 minutes    Subjective:     Patient ID: Gita Rendon is a 38 y.o. female.    [facility] Clinical Progress Notes      Specialized Services Documentation  Therapist must complete separate progress note for each specific clinical activity in which the individual participated during the day.       Group Psychotherapy  This group was facilitated virtually in a private office using HIPAA Compliant and Approved [employer] Teams. Gita Rendon consented to the use of tele-video modality of treatment  ([id number]) Gita Rendon actively engaged in psychoeducational group about medication management, types of antidepressants/side effects, and medication tips. Group came up with strategies that helped them remember to take their medications and developed ideas to make it easier in the future. The group talked about understanding the purpose, dose, type, timing and side effects of their medications. Teaching on emergency situations and who to go to for help was brought up as well. Group was encouraged to ask questions in an open forum at the end of group. Good progress displayed through engagement in topic.  Treatment Plan Objective 1.1, 1.2, 1.3, 1.4 Therapist: Juliet PROCTOR RN         Visit Time    Visit Start Time: 1330  Visit Stop Time: 1430  Total Visit Duration:  60 minutes        GROUP PSYCHOTHERAPY    [id number]  This group was facilitated virtually in a private office using HIPAA Compliant and Approved [employer] Teams. Gita Rendon consented to the use of tele-video modality of treatment    Gita Rendon participated actively in psychotherapy group.  This was observed Consistent throughout the treatment day. They were engaged in learning related to Illness, Medication, Aftercare, and Wellness Tools. Staff utilized Verbal, Written, A/V, and Demonstration teaching methods.  Gita Rendon shared area  of learning and set a goal for outside of program to write an email to her therapist.   Ended session with staff led exercise of [employer] meditation.  Gita Calerorated Good progress toward goal.  Continue group psychotherapy to actively practice learned skills and encourage transfer of knowledge to unstructured time.      Tx Plan Objective: 1.1, 1.2, 1.4, Therapist: Juliet PROCTOR RN

## 2024-08-05 NOTE — PSYCH
Virtual Regular Visit    Verification of patient location:    Patient is located at Home in the following state in which I hold an active license [place]      Assessment/Plan:    Problem List Items Addressed This Visit       EILEEN (generalized anxiety disorder)    Grief    Major depressive disorder, recurrent, severe without psychotic features (HCC) - Primary       Goals addressed in session:           Reason for visit is PHP VIRTUAL GROUP DUE TO virtual preference of care    Encounter provider [facility] PARTIAL PSYCH PROGRAM      Recent Visits  No visits were found meeting these conditions.  Showing recent visits within past 7 days and meeting all other requirements  Future Appointments  No visits were found meeting these conditions.  Showing future appointments within next 150 days and meeting all other requirements       The patient was identified by name and date of birth. Gita Rendon was informed that this is a telemedicine visit and that the visit is being conducted throughthe Microsoft Teams platform. She agrees to proceed..  My office door was closed. No one else was in the room.  She acknowledged consent and understanding of privacy and security of the video platform. The patient has agreed to participate and understands they can discontinue the visit at any time.    Patient is aware this is a billable service.     Subjective  Gita Rendon is a 38 y.o. female  .      HPI     Past Medical History:   Diagnosis Date    Allergic     Allergic rhinitis     Anorexia nervosa in remission     Anxiety     Arthritis 5/8/2014    Back pain     Bipolar disorder (HCC)     Depression     Dermatitis 10/16/2021    Disease of thyroid gland     Diverticulitis of colon 9/20/2015    Dizziness     Ear problems     Eating disorder     Esophageal reflux 08/15/2013    GERD (gastroesophageal reflux disease) 8/15/2013    Headache(784.0)     Headache, tension-type     Hypertension     Hypothyroid     Idiopathic intracranial hypertension      Lumbar degenerative disc disease 05/08/2014    Migraine     Nasal congestion     Nosebleed     Obesity     Obsessive-compulsive disorder     Otitis media     Overactive bladder     Psychiatric disorder     Psychiatric illness     Restless leg syndrome, controlled     Seasonal allergies     Sinusitis     Sleep apnea     Sleep difficulties     Suicide and self-inflicted injury (HCC)     Tinnitus     TMJ dysfunction     Tonsillitis     Transcranial Magnetic Stimulation 09/06/2021    Urinary tract infection     Vision loss        Past Surgical History:   Procedure Laterality Date    DENTAL SURGERY      wisdom teeth-at [age] years. Other surgery dental extraxtion of bone spur (10 years ago)    IR LUMBAR PUNCTURE  02/26/2019    SINUS ENDOSCOPY      SINUS SURGERY      TONSILLECTOMY         Current Outpatient Medications   Medication Sig Dispense Refill    acetaZOLAMIDE (DIAMOX) 250 mg tablet Take 2 tablets (500 mg total) by mouth 2 (two) times a day 120 tablet 2    almotriptan (AXERT) 12.5 MG tablet Take 12.5 mg by mouth once as needed for migraine may repeat in 2 hours if needed.  Limited to 2 x a week      Atogepant (Qulipta) 60 MG TABS Take 60 mg by mouth in the morning      cariprazine (VRAYLAR) 6 MG capsule Take 1 capsule (6 mg total) by mouth daily 30 capsule 0    celecoxib (CeleBREX) 200 mg capsule Take 1 capsule (200 mg total) by mouth 2 (two) times a day 60 capsule 0    Cequa 0.09 % SOLN Apply 1 drop to eye 2 (two) times a day 60 each 0    cetirizine (ZyrTEC) 10 MG chewable tablet Chew 10 mg daily      cholecalciferol 1,000 units tablet Take 5 tablets (5,000 Units total) by mouth daily 150 tablet 0    DULoxetine (CYMBALTA) 60 mg delayed release capsule Take 1 capsule (60 mg total) by mouth daily 30 capsule 1    gabapentin (NEURONTIN) 300 mg capsule Take 1 capsule (300 mg total) by mouth daily after breakfast      gabapentin (NEURONTIN) 600 MG tablet Take 1.5 tablets (900 mg total) by mouth daily at bedtime 45  tablet 0    hydrocortisone (ANUSOL-HC) 2.5 % rectal cream Apply topically 2 (two) times a day 30 g 5    ipratropium (ATROVENT) 0.03 % nasal spray 2 sprays into each nostril every 12 (twelve) hours 30 mL 1    levothyroxine 112 mcg tablet Take 1 tablet (112 mcg total) by mouth daily in the early morning 90 tablet 1    linaCLOtide (Linzess) 290 MCG CAPS Take 1 capsule by mouth daily 90 capsule 1    metFORMIN (GLUCOPHAGE) 500 mg tablet Take 1 tablet (500 mg total) by mouth daily with breakfast 90 tablet 1    Mirabegron ER 25 MG TB24 Take 25 mg by mouth daily 30 tablet 5    Multiple Vitamin (MULTIVITAMIN) capsule Take 1 capsule by mouth daily      omeprazole (PriLOSEC) 20 mg delayed release capsule Take 1 capsule (20 mg total) by mouth 2 (two) times a day 180 capsule 1    prochlorperazine (COMPAZINE) 10 mg tablet Take 10 mg by mouth every 6 (six) hours as needed for nausea or vomiting Take 1 tablet by TID if needed for headache or nausea. Limit to 2x a week .      Semaglutide-Weight Management (WEGOVY) 1.7 MG/0.75ML Inject 0.75 mL (1.7 mg total) under the skin once a week 3 mL 1    spironolactone (ALDACTONE) 25 mg tablet Take 1 tablet (25 mg total) by mouth daily 30 tablet 1    topiramate (TOPAMAX) 100 mg tablet TAKE 1.5 TABLETS BID       No current facility-administered medications for this visit.        Allergies   Allergen Reactions    Doxycycline      Pt refuses d/t remote H/O intracranial hypertension     Other Other (See Comments)     Seasonal allergies       Review of Systems    Video Exam    There were no vitals filed for this visit.    Physical Exam     I spent FIVE GROUP HOURS PLUS CASE MANAGEMENT minutes with patient today in which greater than 50% of time was spent in counseling/coordination of care regarding PHP - SEE NOTES

## 2024-08-05 NOTE — PSYCH
Visit Time    Visit Start Time: 1215  Visit Stop Time: 1315  Total Visit Duration: 40 minutes      Subjective:     Patient ID: Elizabeth Rendon is a 38 y.o. female.     [facility] Clinical Progress Notes      Specialized Services Documentation  Therapist must complete separate progress note for each specific clinical activity in which the individual participated during the day.      Group Psychotherapy - Roadblocks   Elizabeth Rendon participated actively in a psychotherapy group focused on roadblocks to wellness. Today group members focused on the roadblocks and solutions that can be inquired for the mental health wellness. Group members also were to complete their own road map that has their own roadblocks and then identify solutions to overcome their roadblocks. The goal of today was for group members to participate in groups discussion on roadblocks and solutions of their anxiety. This writer encouraged members to openly share and integrate coping skills into their daily routine. Gita Rendon made good efforts towards progress goals which were displayed through participation, notetaking, and engagement in topic.    Tx Plan Objective: 1.1,1.2,1.4 Therapist: Ashley Costenbader MA LMT      This group was facilitated virtually in a private office using HIPAA Compliant and Approved Microsoft Teams.  Gita Rendon consented to the use of tele-video modality of treatment.

## 2024-08-06 ENCOUNTER — APPOINTMENT (OUTPATIENT)
Dept: PSYCHOLOGY | Facility: CLINIC | Age: 39
End: 2024-08-06
Payer: COMMERCIAL

## 2024-08-08 ENCOUNTER — OFFICE VISIT (OUTPATIENT)
Dept: BARIATRICS | Facility: CLINIC | Age: 39
End: 2024-08-08
Payer: COMMERCIAL

## 2024-08-08 VITALS
SYSTOLIC BLOOD PRESSURE: 124 MMHG | WEIGHT: 293 LBS | HEART RATE: 72 BPM | RESPIRATION RATE: 20 BRPM | TEMPERATURE: 98.5 F | DIASTOLIC BLOOD PRESSURE: 78 MMHG | BODY MASS INDEX: 45.99 KG/M2 | OXYGEN SATURATION: 99 % | HEIGHT: 67 IN

## 2024-08-08 DIAGNOSIS — R73.03 PREDIABETES: ICD-10-CM

## 2024-08-08 DIAGNOSIS — E66.01 MORBID OBESITY (HCC): Primary | ICD-10-CM

## 2024-08-08 PROCEDURE — 99214 OFFICE O/P EST MOD 30 MIN: CPT | Performed by: PHYSICIAN ASSISTANT

## 2024-08-08 NOTE — PATIENT INSTRUCTIONS
Goals:    Food log (ie.) www.[employer].com,[employer].com,Flowboardit.com,[employer].com,etc.   No sugary beverages. At least 64oz of water daily.  Increase physical activity by 10 minutes daily. Gradually increase physical activity to a goal of 5 days per week for 30 minutes of MODERATE intensity PLUS 2 days per week of FULL BODY resistance training  5-10 servings of fruits and vegetables per day  25-35 grams of fiber per day  Hard boiled eggs, Fairlife milk, Tuna, cheese stick, nuts, lean meat such as chicken, turkey

## 2024-08-08 NOTE — PROGRESS NOTES
Assessment/Plan:    Morbid obesity (HCC)  -Patient is pursuing Conservative Program  -Initial weight loss goal of 5-10% weight loss for improved health- met  -Screening labs: reviewed, up to date  -dietary/lifestyle changes, continue to work with BH  -On Topamax for headaches  -No longer on Wellbutrin for mood  -avoid Phentermine  -Was on Ozempic and now switched to Wegovy and on 1.7mg dose. Concerned she is not getting enough calories. Discussed trial off Wegovy. Patient reports she has a full box of 1.7mg. Discussed importance of adequate protein in her diet. She would like to make an effort to improve her nutrition. She will let me know in 1 month how she is doing with this  -She will work on increasing water to goal, reducing artificially sweetened drinks and increasing fruit and veggies  -medication agreement signed    Initial: 421 lbs  Last OV: 337  Current: 320 lbs  Change: -101 lbs  Goal: wants to feel healthy    Prediabetes  -On Metformin 500mg daily  -On Wegovy 1.7mg  -most recent HgbA1c 5.3 down from 6.1  - Denies personal hx of pancreatitis or family hx of MEN2/MTC    Goals:    Food log (ie.) www.[employer].com,sparkpeople.com,[employer].com,calorieking.com,etc.   No sugary beverages. At least 64oz of water daily.  Increase physical activity by 10 minutes daily. Gradually increase physical activity to a goal of 5 days per week for 30 minutes of MODERATE intensity PLUS 2 days per week of FULL BODY resistance training  5-10 servings of fruits and vegetables per day  25-35 grams of fiber per day  Hard boiled eggs, Fairlife milk, Tuna, cheese stick, nuts, lean meat such as chicken, turkey      Follow up in approximately  4 months  with Non-Surgical Physician/Advanced Practitioner.     Diagnoses and all orders for this visit:    Morbid obesity (HCC)    BMI 50.0-59.9, adult (HCC)    Prediabetes          Subjective:   Chief Complaint   Patient presents with    Follow-up        Patient ID: Gita CALHOUN Justice  is a  "38 y.o. female with excess weight/obesity here to pursue weight managment.  Patient is pursuing Conservative Program.     HPI Patient presents for [facility] follow up. Remains on Wegovy 1.7mg. Reports she is not cooking much these days.    -Hospitalized again in July for Depression. Reports she is continuing to struggle with depression. Feeling very fatigued. Following closely with psychaitry    -Hydration: minimal water, diet decaf iced tea, iced coffee , premiere protein shake  -Exercise: denies    Sleep 9-930pm and wakes around 8am    B: uncrustable   S: skips  L:  left overs from dinner  S: skips  D: Meatloaf + mashed potato + asparagus OR granola bar or crackers with PB  S: skips        Wt Readings from Last 10 Encounters:   08/08/24 (!) 145 kg (320 lb)   07/07/24 (!) 146 kg (322 lb)   06/26/24 (!) 148 kg (325 lb 3.2 oz)   06/21/24 (!) 149 kg (329 lb)   06/17/24 (!) 149 kg (329 lb)   06/12/24 (!) 148 kg (327 lb)   06/04/24 (!) 149 kg (329 lb 3.2 oz)   05/25/24 (!) 154 kg (340 lb 9.8 oz)   05/17/24 (!) 154 kg (339 lb 9.6 oz)   04/29/24 (!) 151 kg (332 lb)      The following portions of the patient's history were reviewed and updated as appropriate: allergies, current medications, past family history, past medical history, past social history, past surgical history, and problem list.    Review of Systems   Gastrointestinal:  Positive for constipation (improved, moving bowels every other day) and nausea (intermittently). Negative for abdominal pain and vomiting.   Psychiatric/Behavioral:  Positive for dysphoric mood.        Objective:    /78 (BP Location: Left arm, Patient Position: Sitting, Cuff Size: Large)   Pulse 72   Temp 98.5 °F (36.9 °C)   Resp 20   Ht 5' 7\" (1.702 m)   Wt (!) 145 kg (320 lb)   LMP 07/04/2024   SpO2 99%   BMI 50.12 kg/m²      Physical Exam  Vitals and nursing note reviewed.      Constitutional   General appearance: Abnormal.  well developed and morbidly obese.   Eyes No conjunctival " pallor.   Ears, Nose, Mouth, and Throat Oral mucosa moist.   Pulmonary   Respiratory effort: No increased work of breathing or signs of respiratory distress.    Auscultation of lungs: Clear to auscultation, equal breath sounds bilaterally, no wheezes, no rales, no rhonci.    Cardiovascular   Auscultation of heart: Normal rate and rhythm, normal S1 and S2, without murmurs.    Examination of extremities for edema and/or varicosities: Normal.  no edema.   Abdomen   Abdomen: Abnormal.  The abdomen was obese. Bowel sounds were normal. The abdomen was soft and nontender.   Musculoskeletal   Gait and station: Normal.    Psychiatric   Orientation to person, place and time: Normal.    Affect: flat

## 2024-08-08 NOTE — ASSESSMENT & PLAN NOTE
-Patient is pursuing Conservative Program  -Initial weight loss goal of 5-10% weight loss for improved health- met  -Screening labs: reviewed, up to date  -dietary/lifestyle changes, continue to work with BH  -On Topamax for headaches  -No longer on Wellbutrin for mood  -avoid Phentermine  -Was on Ozempic and now switched to Wegovy and on 1.7mg dose. Concerned she is not getting enough calories. Discussed trial off Wegovy. Patient reports she has a full box of 1.7mg. Discussed importance of adequate protein in her diet. She would like to make an effort to improve her nutrition. She will let me know in 1 month how she is doing with this  -She will work on increasing water to goal, reducing artificially sweetened drinks and increasing fruit and veggies  -medication agreement signed    Initial: 421 lbs  Last OV: 337  Current: 320 lbs  Change: -101 lbs  Goal: wants to feel healthy

## 2024-08-12 ENCOUNTER — DOCUMENTATION (OUTPATIENT)
Dept: PSYCHOLOGY | Facility: CLINIC | Age: 39
End: 2024-08-12

## 2024-08-12 NOTE — PROGRESS NOTES
Zero Suicide Follow Up Action    This writer attempted to speak with Gita Rendon today - 7 days post discharge from [facility].   Reviewed crisis number on message and requested return call.    Leeann Kwon

## 2024-08-17 DIAGNOSIS — K59.09 CHRONIC CONSTIPATION: ICD-10-CM

## 2024-08-18 RX ORDER — LINACLOTIDE 290 UG/1
290 CAPSULE, GELATIN COATED ORAL DAILY
Qty: 90 CAPSULE | Refills: 0 | Status: SHIPPED | OUTPATIENT
Start: 2024-08-18 | End: 2025-02-14

## 2024-08-21 DIAGNOSIS — E66.01 MORBID OBESITY (HCC): ICD-10-CM

## 2024-08-25 ENCOUNTER — HOSPITAL ENCOUNTER (EMERGENCY)
Facility: HOSPITAL | Age: 39
Discharge: HOME | End: 2024-08-25
Attending: EMERGENCY MEDICINE | Admitting: EMERGENCY MEDICINE
Payer: COMMERCIAL

## 2024-08-25 VITALS
BODY MASS INDEX: 50.65 KG/M2 | OXYGEN SATURATION: 100 % | TEMPERATURE: 98.3 F | HEART RATE: 57 BPM | DIASTOLIC BLOOD PRESSURE: 67 MMHG | SYSTOLIC BLOOD PRESSURE: 142 MMHG | WEIGHT: 293 LBS | RESPIRATION RATE: 16 BRPM

## 2024-08-25 DIAGNOSIS — G43.909 MIGRAINE HEADACHE: Primary | ICD-10-CM

## 2024-08-25 LAB
EXT PREGNANCY TEST URINE: NEGATIVE
EXT. CONTROL: NORMAL

## 2024-08-25 PROCEDURE — 96375 TX/PRO/DX INJ NEW DRUG ADDON: CPT

## 2024-08-25 PROCEDURE — 99284 EMERGENCY DEPT VISIT MOD MDM: CPT | Performed by: EMERGENCY MEDICINE

## 2024-08-25 PROCEDURE — 96365 THER/PROPH/DIAG IV INF INIT: CPT

## 2024-08-25 PROCEDURE — 99284 EMERGENCY DEPT VISIT MOD MDM: CPT

## 2024-08-25 PROCEDURE — 81025 URINE PREGNANCY TEST: CPT | Performed by: EMERGENCY MEDICINE

## 2024-08-25 RX ORDER — KETOROLAC TROMETHAMINE 30 MG/ML
15 INJECTION, SOLUTION INTRAMUSCULAR; INTRAVENOUS ONCE
Status: COMPLETED | OUTPATIENT
Start: 2024-08-25 | End: 2024-08-25

## 2024-08-25 RX ORDER — ACETAMINOPHEN 325 MG/1
975 TABLET ORAL ONCE
Status: COMPLETED | OUTPATIENT
Start: 2024-08-25 | End: 2024-08-25

## 2024-08-25 RX ORDER — MAGNESIUM SULFATE HEPTAHYDRATE 40 MG/ML
2 INJECTION, SOLUTION INTRAVENOUS ONCE
Status: COMPLETED | OUTPATIENT
Start: 2024-08-25 | End: 2024-08-25

## 2024-08-25 RX ORDER — METOCLOPRAMIDE HYDROCHLORIDE 5 MG/ML
10 INJECTION INTRAMUSCULAR; INTRAVENOUS ONCE
Status: COMPLETED | OUTPATIENT
Start: 2024-08-25 | End: 2024-08-25

## 2024-08-25 RX ADMIN — SODIUM CHLORIDE 1000 ML: 0.9 INJECTION, SOLUTION INTRAVENOUS at 21:54

## 2024-08-25 RX ADMIN — MAGNESIUM SULFATE HEPTAHYDRATE 2 G: 40 INJECTION, SOLUTION INTRAVENOUS at 21:59

## 2024-08-25 RX ADMIN — ACETAMINOPHEN 975 MG: 325 TABLET, FILM COATED ORAL at 21:54

## 2024-08-25 RX ADMIN — METOCLOPRAMIDE 10 MG: 5 INJECTION, SOLUTION INTRAMUSCULAR; INTRAVENOUS at 21:57

## 2024-08-25 RX ADMIN — KETOROLAC TROMETHAMINE 15 MG: 30 INJECTION, SOLUTION INTRAMUSCULAR at 21:55

## 2024-08-26 NOTE — ED TRIAGE NOTES
Patient reports having a headache for the past 5 days. Reports taking her abortive meds without relief. Patient has a history of migraines. Patient reports upper/mid back pain.

## 2024-08-26 NOTE — ED PROVIDER NOTES
History  Chief Complaint   Patient presents with    Headache     Patient reports having a headache for the past 5 days. Reports taking her abortive meds without relief. Patient has a history of migraines. Patient reports upper/mid back pain.      HPI    39-year-old female with history of migraines and IIH presenting for evaluation of a headache.  Patient states she has been having a headache for the past 5 days.  She states this feels similar to prior headaches.  She states headache was worse 2 days ago but it is still persisting now.  She has had some mild photophobia.  Denies nausea or vomiting.  She also states she has pain going down into her neck and upper back.  Denies chest pain or shortness of breath.  Denies fevers.  Denies neck stiffness.  Denies bowel pain.  Denies numbness or weakness in her extremities, ataxia, vertigo, dysphagia or dysarthria.  She has been taking all of her daily medications as well as abortive medications without improvement in symptoms.    Prior to Admission Medications   Prescriptions Last Dose Informant Patient Reported? Taking?   Atogepant (Qulipta) 60 MG TABS  Self Yes No   Sig: Take 60 mg by mouth in the morning   Cequa 0.09 % SOLN  Self No No   Sig: Apply 1 drop to eye 2 (two) times a day   DULoxetine (CYMBALTA) 60 mg delayed release capsule   No No   Sig: Take 1 capsule (60 mg total) by mouth daily   Mirabegron ER 25 MG TB24   No No   Sig: Take 25 mg by mouth daily   Multiple Vitamin (MULTIVITAMIN) capsule  Self Yes No   Sig: Take 1 capsule by mouth daily   Semaglutide-Weight Management (WEGOVY) 1.7 MG/0.75ML   No No   Sig: Inject 0.75 mL (1.7 mg total) under the skin once a week   acetaZOLAMIDE (DIAMOX) 250 mg tablet  Self No No   Sig: Take 2 tablets (500 mg total) by mouth 2 (two) times a day   almotriptan (AXERT) 12.5 MG tablet  Self Yes No   Sig: Take 12.5 mg by mouth once as needed for migraine may repeat in 2 hours if needed.  Limited to 2 x a week   cariprazine  (VRAYLAR) 6 MG capsule  Self No No   Sig: Take 1 capsule (6 mg total) by mouth daily   celecoxib (CeleBREX) 200 mg capsule  Self No No   Sig: Take 1 capsule (200 mg total) by mouth 2 (two) times a day   cetirizine (ZyrTEC) 10 MG chewable tablet  Self Yes No   Sig: Chew 10 mg daily   cholecalciferol 1,000 units tablet  Self No No   Sig: Take 5 tablets (5,000 Units total) by mouth daily   gabapentin (NEURONTIN) 300 mg capsule   No No   Sig: Take 1 capsule (300 mg total) by mouth daily after breakfast   gabapentin (NEURONTIN) 600 MG tablet  Self No No   Sig: Take 1.5 tablets (900 mg total) by mouth daily at bedtime   hydrocortisone (ANUSOL-HC) 2.5 % rectal cream   No No   Sig: Apply topically 2 (two) times a day   ipratropium (ATROVENT) 0.03 % nasal spray   No No   Si sprays into each nostril every 12 (twelve) hours   levothyroxine 112 mcg tablet  Self No No   Sig: Take 1 tablet (112 mcg total) by mouth daily in the early morning   linaCLOtide (Linzess) 290 MCG CAPS   No No   Sig: Take 1 capsule by mouth daily   metFORMIN (GLUCOPHAGE) 500 mg tablet   No No   Sig: Take 1 tablet (500 mg total) by mouth daily with breakfast   omeprazole (PriLOSEC) 20 mg delayed release capsule   No No   Sig: Take 1 capsule (20 mg total) by mouth 2 (two) times a day   prochlorperazine (COMPAZINE) 10 mg tablet  Self Yes No   Sig: Take 10 mg by mouth every 6 (six) hours as needed for nausea or vomiting Take 1 tablet by TID if needed for headache or nausea. Limit to 2x a week .   spironolactone (ALDACTONE) 25 mg tablet   No No   Sig: Take 1 tablet (25 mg total) by mouth daily   topiramate (TOPAMAX) 100 mg tablet  Self Yes No   Sig: TAKE 1.5 TABLETS BID      Facility-Administered Medications: None       Past Medical History:   Diagnosis Date    Allergic     Allergic rhinitis     Anorexia nervosa in remission     Anxiety     Arthritis 2014    Back pain     Bipolar disorder (HCC)     Depression     Dermatitis 10/16/2021    Disease of  thyroid gland     Diverticulitis of colon 9/20/2015    Dizziness     Ear problems     Eating disorder     Esophageal reflux 08/15/2013    GERD (gastroesophageal reflux disease) 8/15/2013    Headache(784.0)     Headache, tension-type     Hypertension     Hypothyroid     Idiopathic intracranial hypertension     Lumbar degenerative disc disease 05/08/2014    Migraine     Nasal congestion     Nosebleed     Obesity     Obsessive-compulsive disorder     Otitis media     Overactive bladder     Psychiatric disorder     Psychiatric illness     Restless leg syndrome, controlled     Seasonal allergies     Sinusitis     Sleep apnea     Sleep difficulties     Suicide and self-inflicted injury (HCC)     Tinnitus     TMJ dysfunction     Tonsillitis     Transcranial Magnetic Stimulation 09/06/2021    Urinary tract infection     Vision loss        Past Surgical History:   Procedure Laterality Date    DENTAL SURGERY      wisdom teeth-at [age] years. Other surgery dental extraxtion of bone spur (10 years ago)    IR LUMBAR PUNCTURE  02/26/2019    SINUS ENDOSCOPY      SINUS SURGERY      TONSILLECTOMY         Family History   Problem Relation Age of Onset    Mental illness Mother     Depression Mother     Suicide Attempts Mother     Hypertension Mother     Diabetes Mother     Other Mother         bicuspid aortic valve    Anxiety disorder Mother     Psychiatric Illness Mother     Schizoaffective Disorder  Mother     Anemia Mother     Hypertension Father     Hypothyroidism Father     Thyroid disease Father     Hypertension Brother     Cancer Maternal Grandmother     Heart disease Maternal Grandmother     Stroke Maternal Grandmother     Breast cancer Maternal Grandmother     Skin cancer Maternal Grandmother     Arthritis Maternal Grandmother     Pneumonia Maternal Grandfather     Cancer Maternal Grandfather     Dementia Maternal Grandfather     COPD Maternal Grandfather     Cancer Paternal Grandmother     Pneumonia Paternal Grandfather      Arthritis Family     Breast cancer Family     Osteopenia Family     Osteoporosis Family     Hypertension Family     Transient ischemic attack Family      I have reviewed and agree with the history as documented.    E-Cigarette/Vaping    E-Cigarette Use Never User      E-Cigarette/Vaping Substances    Nicotine No     THC No     CBD No     Flavoring No     Other No     Unknown No      Social History     Tobacco Use    Smoking status: Never     Passive exposure: Never    Smokeless tobacco: Never   Vaping Use    Vaping status: Never Used   Substance Use Topics    Alcohol use: Never    Drug use: Never       Review of Systems   Constitutional:  Negative for appetite change, chills and fever.   HENT:  Negative for congestion, rhinorrhea and sore throat.    Eyes:  Positive for photophobia.   Respiratory:  Negative for cough and shortness of breath.    Cardiovascular:  Negative for chest pain.   Gastrointestinal:  Negative for abdominal pain, diarrhea, nausea and vomiting.   Genitourinary:  Negative for dysuria, frequency, hematuria and urgency.   Musculoskeletal:  Positive for back pain and neck pain. Negative for arthralgias and myalgias.   Skin:  Negative for rash.   Neurological:  Positive for headaches. Negative for dizziness, weakness, light-headedness and numbness.   All other systems reviewed and are negative.      Physical Exam  Physical Exam  Vitals and nursing note reviewed.   Constitutional:       General: She is not in acute distress.     Appearance: Normal appearance. She is well-developed and normal weight. She is not ill-appearing, toxic-appearing or diaphoretic.   HENT:      Head: Normocephalic and atraumatic.      Right Ear: External ear normal.      Left Ear: External ear normal.      Nose: Nose normal.      Mouth/Throat:      Mouth: Mucous membranes are moist.      Pharynx: Oropharynx is clear.   Eyes:      Extraocular Movements: Extraocular movements intact.      Conjunctiva/sclera: Conjunctivae normal.    Cardiovascular:      Rate and Rhythm: Normal rate and regular rhythm.      Pulses: Normal pulses.      Heart sounds: Normal heart sounds. No murmur heard.     No friction rub. No gallop.   Pulmonary:      Effort: Pulmonary effort is normal. No respiratory distress.      Breath sounds: Normal breath sounds. No wheezing or rales.   Abdominal:      General: There is no distension.      Palpations: Abdomen is soft.      Tenderness: There is no abdominal tenderness. There is no guarding or rebound.   Musculoskeletal:         General: No tenderness.      Cervical back: Neck supple.   Skin:     General: Skin is warm and dry.      Coloration: Skin is not pale.      Findings: No erythema or rash.   Neurological:      General: No focal deficit present.      Mental Status: She is alert and oriented to person, place, and time.      Cranial Nerves: No cranial nerve deficit.      Sensory: No sensory deficit.      Motor: No weakness.      Coordination: Coordination normal.      Gait: Gait normal.   Psychiatric:         Mood and Affect: Mood normal.         Behavior: Behavior normal.         Vital Signs  ED Triage Vitals   Temperature Pulse Respirations Blood Pressure SpO2   08/25/24 2112 08/25/24 2131 08/25/24 2131 08/25/24 2131 08/25/24 2131   98.3 °F (36.8 °C) 59 20 (!) 184/101 99 %      Temp Source Heart Rate Source Patient Position - Orthostatic VS BP Location FiO2 (%)   08/25/24 2112 08/25/24 2131 08/25/24 2131 08/25/24 2131 --   Temporal Monitor Sitting Right arm       Pain Score       08/25/24 2131       7           Vitals:    08/25/24 2131 08/25/24 2239   BP: (!) 184/101 142/67   Pulse: 59 57   Patient Position - Orthostatic VS: Sitting Sitting         Visual Acuity      ED Medications  Medications   magnesium sulfate 2 g/50 mL IVPB (premix) 2 g (0 g Intravenous Stopped 8/25/24 2236)   ketorolac (TORADOL) injection 15 mg (15 mg Intravenous Given 8/25/24 2155)   sodium chloride 0.9 % bolus 1,000 mL (0 mL Intravenous  Stopped 8/25/24 2236)   acetaminophen (TYLENOL) tablet 975 mg (975 mg Oral Given 8/25/24 2154)   metoclopramide (REGLAN) injection 10 mg (10 mg Intravenous Given 8/25/24 2157)       Diagnostic Studies  Results Reviewed       Procedure Component Value Units Date/Time    POCT pregnancy, urine [[id number]]  (Normal) Resulted: 08/25/24 2150    Lab Status: Final result Updated: 08/25/24 2159     EXT Preg Test, Ur Negative     Control Valid                   No orders to display              Procedures  Procedures         ED Course                                 SBIRT 22yo+      Flowsheet Row Most Recent Value   Initial Alcohol Screen: US AUDIT-C     1. How often do you have a drink containing alcohol? 0 Filed at: 08/25/2024 2130   2. How many drinks containing alcohol do you have on a typical day you are drinking?  0 Filed at: 08/25/2024 2130   3a. Male UNDER 65: How often do you have five or more drinks on one occasion? 0 Filed at: 08/25/2024 2130   3b. FEMALE Any Age, or MALE 65+: How often do you have 4 or more drinks on one occassion? 0 Filed at: 08/25/2024 2130   Audit-C Score 0 Filed at: 08/25/2024 2130   JENNIFER: How many times in the past year have you...    Used an illegal drug or used a prescription medication for non-medical reasons? Never Filed at: 08/25/2024 2130                      Medical Decision Making  Amount and/or Complexity of Data Reviewed  Labs: ordered.    Risk  OTC drugs.  Prescription drug management.      39-year-old female with hx of migraines and IIH presenting for evaluation of a headache.    Patient has a normal neurologic exam, will treat symptomatically with migraine cocktail.  No need for neuroimaging at this time.  Reassessed patient, she states headache is improving.  She feels comfortable with plan for discharge home.  She has follow-up with her neurologist this week.  Discussed with patient strict return precautions.  Patient expressed understanding and was agreeable for discharge.            Disposition  Final diagnoses:   Migraine headache     Time reflects when diagnosis was documented in both MDM as applicable and the Disposition within this note       Time User Action Codes Description Comment    8/25/2024 10:37 PM Dalia Singh Add [G43.909] Migraine headache           ED Disposition       ED Disposition   Discharge    Condition   Stable    Date/Time   Sun Aug 25, 2024 2237    Comment   Gita CALHOUN Justice discharge to home/self care.                   Follow-up Information       Follow up With Specialties Details Why Contact Info    Norma Traore MD Family Medicine   [address] [place] [address]  [phone]      neurology                Discharge Medication List as of 8/25/2024 10:38 PM        CONTINUE these medications which have NOT CHANGED    Details   acetaZOLAMIDE (DIAMOX) 250 mg tablet Take 2 tablets (500 mg total) by mouth 2 (two) times a day, Starting Wed 6/12/2024, Normal      almotriptan (AXERT) 12.5 MG tablet Take 12.5 mg by mouth once as needed for migraine may repeat in 2 hours if needed.  Limited to 2 x a week, Historical Med      Atogepant (Qulipta) 60 MG TABS Take 60 mg by mouth in the morning, Historical Med      cariprazine (VRAYLAR) 6 MG capsule Take 1 capsule (6 mg total) by mouth daily, Starting Wed 4/17/2024, Until Tue 7/16/2024, Normal      celecoxib (CeleBREX) 200 mg capsule Take 1 capsule (200 mg total) by mouth 2 (two) times a day, Starting Tue 4/16/2024, Normal      Cequa 0.09 % SOLN Apply 1 drop to eye 2 (two) times a day, Starting Sat 8/5/2023, Normal      cetirizine (ZyrTEC) 10 MG chewable tablet Chew 10 mg daily, Historical Med      cholecalciferol 1,000 units tablet Take 5 tablets (5,000 Units total) by mouth daily, Starting Tue 4/16/2024, Normal      DULoxetine (CYMBALTA) 60 mg delayed release capsule Take 1 capsule (60 mg total) by mouth daily, Starting Tue 7/16/2024, Normal      gabapentin (NEURONTIN) 300 mg capsule Take 1 capsule (300  mg total) by mouth daily after breakfast, Starting Fri 7/12/2024, No Print      gabapentin (NEURONTIN) 600 MG tablet Take 1.5 tablets (900 mg total) by mouth daily at bedtime, Starting Tue 4/16/2024, Normal      hydrocortisone (ANUSOL-HC) 2.5 % rectal cream Apply topically 2 (two) times a day, Starting Wed 6/26/2024, Normal      ipratropium (ATROVENT) 0.03 % nasal spray 2 sprays into each nostril every 12 (twelve) hours, Starting Mon 7/22/2024, Normal      levothyroxine 112 mcg tablet Take 1 tablet (112 mcg total) by mouth daily in the early morning, Starting Mon 4/29/2024, Normal      linaCLOtide (Linzess) 290 MCG CAPS Take 1 capsule by mouth daily, Starting Sun 8/18/2024, Until Fri 2/14/2025, Normal      metFORMIN (GLUCOPHAGE) 500 mg tablet Take 1 tablet (500 mg total) by mouth daily with breakfast, Starting Wed 6/26/2024, Normal      Mirabegron ER 25 MG TB24 Take 25 mg by mouth daily, Starting Tue 7/16/2024, Normal      Multiple Vitamin (MULTIVITAMIN) capsule Take 1 capsule by mouth daily, Historical Med      omeprazole (PriLOSEC) 20 mg delayed release capsule Take 1 capsule (20 mg total) by mouth 2 (two) times a day, Starting Mon 6/17/2024, Normal      prochlorperazine (COMPAZINE) 10 mg tablet Take 10 mg by mouth every 6 (six) hours as needed for nausea or vomiting Take 1 tablet by TID if needed for headache or nausea. Limit to 2x a week ., Historical Med      Semaglutide-Weight Management (WEGOVY) 1.7 MG/0.75ML Inject 0.75 mL (1.7 mg total) under the skin once a week, Starting Thu 8/22/2024, Normal      spironolactone (ALDACTONE) 25 mg tablet Take 1 tablet (25 mg total) by mouth daily, Starting Mon 7/29/2024, Normal      topiramate (TOPAMAX) 100 mg tablet TAKE 1.5 TABLETS BID, Starting Sat 6/1/2024, Historical Med             No discharge procedures on file.    PDMP Review         Value Time User    PDMP Reviewed  Yes 4/16/2024  2:32 PM LEIGHA Kaiser            ED Provider  Electronically Signed  by             Dalia Singh MD  08/25/24 1143

## 2024-08-28 ENCOUNTER — DOCUMENTATION (OUTPATIENT)
Dept: PSYCHOLOGY | Facility: CLINIC | Age: 39
End: 2024-08-28

## 2024-08-28 NOTE — PROGRESS NOTES
Zero Suicide Follow Up Action    This writer attempted to speak with Gita Rendon today - post discharge from [facility].   Reviewed crisis number on message and requested return call.    Leeann Kwon

## 2024-08-29 DIAGNOSIS — G93.2 INTRACRANIAL HYPERTENSION: ICD-10-CM

## 2024-08-29 RX ORDER — ACETAZOLAMIDE 250 MG/1
500 TABLET ORAL 2 TIMES DAILY
Qty: 120 TABLET | Refills: 2 | Status: SHIPPED | OUTPATIENT
Start: 2024-08-29

## 2024-08-30 ENCOUNTER — DOCUMENTATION (OUTPATIENT)
Dept: PSYCHOLOGY | Facility: CLINIC | Age: 39
End: 2024-08-30

## 2024-08-30 ENCOUNTER — TELEPHONE (OUTPATIENT)
Age: 39
End: 2024-08-30

## 2024-08-30 DIAGNOSIS — N32.81 OVERACTIVE BLADDER: Primary | ICD-10-CM

## 2024-08-30 NOTE — TELEPHONE ENCOUNTER
Pt reports she is having increased symptoms and pt stated she had a 50 mg ordered of medication (Mirabegron ER 25 MG TB24) by the Urologist in the past and wanted to see if PCP was okay ordering that dose for pt.   Pt states she isn't having any side effects on this medication like she did with the oxybutynin.   Please call pt back to further discuss medication inquiry. Phone: [phone]   Thank you

## 2024-08-30 NOTE — PROGRESS NOTES
Zero Suicide Follow Up Action    This writer spoke with Gita Rendon today - post discharge from [facility].   Reviewed crisis information.  Gita Rendon reports taking medication.   Gita Rendon reports awareness of aftercare appointments.    Leeann Kwon

## 2024-09-04 ENCOUNTER — TELEPHONE (OUTPATIENT)
Age: 39
End: 2024-09-04

## 2024-09-04 RX ORDER — MIRABEGRON 50 MG/1
50 TABLET, EXTENDED RELEASE ORAL DAILY
Qty: 30 TABLET | Refills: 0 | Status: SHIPPED | OUTPATIENT
Start: 2024-09-04

## 2024-09-04 NOTE — TELEPHONE ENCOUNTER
Pt call back left left message for provider requesting mirabegron er 50 mg and [facility].please advise.[phone]

## 2024-09-15 ENCOUNTER — RA CDI HCC (OUTPATIENT)
Dept: OTHER | Facility: HOSPITAL | Age: 39
End: 2024-09-15

## 2024-09-16 ENCOUNTER — APPOINTMENT (EMERGENCY)
Dept: CT IMAGING | Facility: HOSPITAL | Age: 39
End: 2024-09-16
Payer: COMMERCIAL

## 2024-09-16 ENCOUNTER — NURSE TRIAGE (OUTPATIENT)
Dept: OTHER | Facility: OTHER | Age: 39
End: 2024-09-16

## 2024-09-16 ENCOUNTER — HOSPITAL ENCOUNTER (EMERGENCY)
Facility: HOSPITAL | Age: 39
Discharge: HOME | End: 2024-09-17
Attending: EMERGENCY MEDICINE | Admitting: EMERGENCY MEDICINE
Payer: COMMERCIAL

## 2024-09-16 VITALS
TEMPERATURE: 97.6 F | HEART RATE: 66 BPM | SYSTOLIC BLOOD PRESSURE: 169 MMHG | OXYGEN SATURATION: 98 % | RESPIRATION RATE: 18 BRPM | DIASTOLIC BLOOD PRESSURE: 100 MMHG

## 2024-09-16 DIAGNOSIS — R10.10 PAIN OF UPPER ABDOMEN: Primary | ICD-10-CM

## 2024-09-16 DIAGNOSIS — R11.0 NAUSEA: ICD-10-CM

## 2024-09-16 LAB
ALBUMIN SERPL BCG-MCNC: 4.3 G/DL (ref 3.5–5)
ALP SERPL-CCNC: 78 U/L (ref 34–104)
ALT SERPL W P-5'-P-CCNC: 18 U/L (ref 7–52)
ANION GAP SERPL CALCULATED.3IONS-SCNC: 9 MMOL/L (ref 4–13)
AST SERPL W P-5'-P-CCNC: 16 U/L (ref 13–39)
BACTERIA UR QL AUTO: ABNORMAL /HPF
BASOPHILS # BLD AUTO: 0.03 THOUSANDS/ΜL (ref 0–0.1)
BASOPHILS NFR BLD AUTO: 0 % (ref 0–1)
BILIRUB SERPL-MCNC: 0.28 MG/DL (ref 0.2–1)
BILIRUB UR QL STRIP: NEGATIVE
BUN SERPL-MCNC: 28 MG/DL (ref 5–25)
CALCIUM SERPL-MCNC: 9.3 MG/DL (ref 8.4–10.2)
CAOX CRY URNS QL MICRO: ABNORMAL /HPF
CHLORIDE SERPL-SCNC: 112 MMOL/L (ref 96–108)
CLARITY UR: CLEAR
CO2 SERPL-SCNC: 19 MMOL/L (ref 21–32)
COLOR UR: YELLOW
CREAT SERPL-MCNC: 0.86 MG/DL (ref 0.6–1.3)
EOSINOPHIL # BLD AUTO: 0.34 THOUSAND/ΜL (ref 0–0.61)
EOSINOPHIL NFR BLD AUTO: 3 % (ref 0–6)
ERYTHROCYTE [DISTWIDTH] IN BLOOD BY AUTOMATED COUNT: 13.1 % (ref 11.6–15.1)
EXT PREGNANCY TEST URINE: NEGATIVE
EXT. CONTROL: NORMAL
FLUAV AG UPPER RESP QL IA.RAPID: NEGATIVE
FLUBV AG UPPER RESP QL IA.RAPID: NEGATIVE
GFR SERPL CREATININE-BSD FRML MDRD: 84 ML/MIN/1.73SQ M
GLUCOSE SERPL-MCNC: 89 MG/DL (ref 65–140)
GLUCOSE UR STRIP-MCNC: NEGATIVE MG/DL
HCT VFR BLD AUTO: 46.5 % (ref 34.8–46.1)
HGB BLD-MCNC: 14.7 G/DL (ref 11.5–15.4)
HGB UR QL STRIP.AUTO: ABNORMAL
IMM GRANULOCYTES # BLD AUTO: 0.03 THOUSAND/UL (ref 0–0.2)
IMM GRANULOCYTES NFR BLD AUTO: 0 % (ref 0–2)
KETONES UR STRIP-MCNC: NEGATIVE MG/DL
LEUKOCYTE ESTERASE UR QL STRIP: NEGATIVE
LIPASE SERPL-CCNC: 17 U/L (ref 11–82)
LYMPHOCYTES # BLD AUTO: 3.24 THOUSANDS/ΜL (ref 0.6–4.47)
LYMPHOCYTES NFR BLD AUTO: 31 % (ref 14–44)
MCH RBC QN AUTO: 28.2 PG (ref 26.8–34.3)
MCHC RBC AUTO-ENTMCNC: 31.6 G/DL (ref 31.4–37.4)
MCV RBC AUTO: 89 FL (ref 82–98)
MONOCYTES # BLD AUTO: 0.71 THOUSAND/ΜL (ref 0.17–1.22)
MONOCYTES NFR BLD AUTO: 7 % (ref 4–12)
MUCOUS THREADS UR QL AUTO: ABNORMAL
NEUTROPHILS # BLD AUTO: 6 THOUSANDS/ΜL (ref 1.85–7.62)
NEUTS SEG NFR BLD AUTO: 59 % (ref 43–75)
NITRITE UR QL STRIP: NEGATIVE
NON-SQ EPI CELLS URNS QL MICRO: ABNORMAL /HPF
NRBC BLD AUTO-RTO: 0 /100 WBCS
PH UR STRIP.AUTO: 5.5 [PH]
PLATELET # BLD AUTO: 279 THOUSANDS/UL (ref 149–390)
PMV BLD AUTO: 9.9 FL (ref 8.9–12.7)
POTASSIUM SERPL-SCNC: 3.6 MMOL/L (ref 3.5–5.3)
PROT SERPL-MCNC: 6.9 G/DL (ref 6.4–8.4)
PROT UR STRIP-MCNC: ABNORMAL MG/DL
RBC # BLD AUTO: 5.22 MILLION/UL (ref 3.81–5.12)
RBC #/AREA URNS AUTO: ABNORMAL /HPF
SARS-COV+SARS-COV-2 AG RESP QL IA.RAPID: NEGATIVE
SODIUM SERPL-SCNC: 140 MMOL/L (ref 135–147)
SP GR UR STRIP.AUTO: >=1.03 (ref 1–1.03)
UROBILINOGEN UR STRIP-ACNC: 2 MG/DL
WBC # BLD AUTO: 10.35 THOUSAND/UL (ref 4.31–10.16)
WBC #/AREA URNS AUTO: ABNORMAL /HPF

## 2024-09-16 PROCEDURE — 81025 URINE PREGNANCY TEST: CPT | Performed by: EMERGENCY MEDICINE

## 2024-09-16 PROCEDURE — 87804 INFLUENZA ASSAY W/OPTIC: CPT | Performed by: EMERGENCY MEDICINE

## 2024-09-16 PROCEDURE — 99284 EMERGENCY DEPT VISIT MOD MDM: CPT

## 2024-09-16 PROCEDURE — 96361 HYDRATE IV INFUSION ADD-ON: CPT

## 2024-09-16 PROCEDURE — 83690 ASSAY OF LIPASE: CPT | Performed by: EMERGENCY MEDICINE

## 2024-09-16 PROCEDURE — 85025 COMPLETE CBC W/AUTO DIFF WBC: CPT | Performed by: EMERGENCY MEDICINE

## 2024-09-16 PROCEDURE — 36415 COLL VENOUS BLD VENIPUNCTURE: CPT | Performed by: EMERGENCY MEDICINE

## 2024-09-16 PROCEDURE — 74177 CT ABD & PELVIS W/CONTRAST: CPT

## 2024-09-16 PROCEDURE — 99285 EMERGENCY DEPT VISIT HI MDM: CPT | Performed by: EMERGENCY MEDICINE

## 2024-09-16 PROCEDURE — 81001 URINALYSIS AUTO W/SCOPE: CPT | Performed by: EMERGENCY MEDICINE

## 2024-09-16 PROCEDURE — 87811 SARS-COV-2 COVID19 W/OPTIC: CPT | Performed by: EMERGENCY MEDICINE

## 2024-09-16 PROCEDURE — 80053 COMPREHEN METABOLIC PANEL: CPT | Performed by: EMERGENCY MEDICINE

## 2024-09-16 PROCEDURE — 96375 TX/PRO/DX INJ NEW DRUG ADDON: CPT

## 2024-09-16 PROCEDURE — 96374 THER/PROPH/DIAG INJ IV PUSH: CPT

## 2024-09-16 RX ORDER — ONDANSETRON 2 MG/ML
4 INJECTION INTRAMUSCULAR; INTRAVENOUS ONCE
Status: COMPLETED | OUTPATIENT
Start: 2024-09-16 | End: 2024-09-16

## 2024-09-16 RX ORDER — FAMOTIDINE 10 MG/ML
20 INJECTION, SOLUTION INTRAVENOUS ONCE
Status: COMPLETED | OUTPATIENT
Start: 2024-09-16 | End: 2024-09-16

## 2024-09-16 RX ORDER — MAGNESIUM HYDROXIDE/ALUMINUM HYDROXICE/SIMETHICONE 120; 1200; 1200 MG/30ML; MG/30ML; MG/30ML
30 SUSPENSION ORAL ONCE
Status: COMPLETED | OUTPATIENT
Start: 2024-09-16 | End: 2024-09-16

## 2024-09-16 RX ADMIN — ONDANSETRON 4 MG: 2 INJECTION INTRAMUSCULAR; INTRAVENOUS at 22:52

## 2024-09-16 RX ADMIN — FAMOTIDINE 20 MG: 10 INJECTION, SOLUTION INTRAVENOUS at 22:52

## 2024-09-16 RX ADMIN — SODIUM CHLORIDE 1000 ML: 0.9 INJECTION, SOLUTION INTRAVENOUS at 23:27

## 2024-09-16 RX ADMIN — IOHEXOL 100 ML: 350 INJECTION, SOLUTION INTRAVENOUS at 23:38

## 2024-09-16 RX ADMIN — ALUMINUM HYDROXIDE, MAGNESIUM HYDROXIDE, AND SIMETHICONE 30 ML: 1200; 120; 1200 SUSPENSION ORAL at 22:52

## 2024-09-16 NOTE — TELEPHONE ENCOUNTER
"Regarding: abdomen pain / nauseous / burping  ----- Message from Myra US sent at 9/16/2024  7:09 PM EDT -----  \"I have been having GI issues since Saturday, it started with burping which had a sulfur taste and now I have pain in my upper abdomen and am nauseous--I have barely been eating. I do have a history of diverticulitis and I was eating dried soybeans last week and read they aren't really good for you so I don't know if that's the issue and if I should go be checked out\"    "

## 2024-09-16 NOTE — TELEPHONE ENCOUNTER
"Moderate constant epigastric pain since Saturday, with nausea and very uncomfortable burping. Very little PO intake in that time. Now with diarrhea. Hx of diverticulitis. No fever. No recent dose increases in wegovy, has been on current dose for several months. Reports feeling dizzy, generalized weakness and dehydrated. Advised ED to r/o any intraabdominal process. Pt agreeable and will go to [facility]    Reason for Disposition   [1] Drinking very little AND [2] dehydration suspected (e.g., no urine > 12 hours, very dry mouth, very lightheaded)    Answer Assessment - Initial Assessment Questions  1. LOCATION: \"Where does it hurt?\"       Upper abdomen slightly to the left    2. RADIATION: \"Does the pain shoot anywhere else?\" (e.g., chest, back)      No radiation    3. ONSET: \"When did the pain begin?\" (e.g., minutes, hours or days ago)       Saturday    4. SUDDEN: \"Gradual or sudden onset?\"      gradual    5. PATTERN \"Does the pain come and go, or is it constant?\"      constant    6. SEVERITY: \"How bad is the pain?\"  (e.g., Scale 1-10; mild, moderate, or severe)      moderate    7. RECURRENT SYMPTOM: \"Have you ever had this type of stomach pain before?\" If Yes, ask: \"When was the last time?\" and \"What happened that time?\"       no    8. AGGRAVATING FACTORS: \"Does anything seem to cause this pain?\" (e.g., foods, stress, alcohol)      no    9. CARDIAC SYMPTOMS: \"Do you have any of the following symptoms: chest pain, difficulty breathing, sweating, nausea?\"      no    10. OTHER SYMPTOMS: \"Do you have any other symptoms?\" (e.g., back pain, diarrhea, fever, urination pain, vomiting)        Gurgling sound in stomach       Did have diarrhea 3-4 times     11. PREGNANCY: \"Is there any chance you are pregnant?\" \"When was your last menstrual period?\"        no    Answer Assessment - Initial Assessment Questions  1. DIARRHEA SEVERITY: \"How bad is the diarrhea?\" \"How many more stools have you had in the past 24 hours than " "normal?\"       4x today, very water    2. ONSET: \"When did the diarrhea begin?\"       today    3. STOOL DESCRIPTION:  \"How loose or watery is the diarrhea?\" \"What is the stool color?\" \"Is there any blood or mucous in the stool?\"      Very water      No blood    4. VOMITING: \"Are you also vomiting?\" If Yes, ask: \"How many times in the past 24 hours?\"       No    5. ABDOMEN PAIN: \"Are you having any abdomen pain?\" If Yes, ask: \"What does it feel like?\" (e.g., crampy, dull, intermittent, constant)       Moderate epigastric pain Since saturday    6. ABDOMEN PAIN SEVERITY: If present, ask: \"How bad is the pain?\"  (e.g., Scale 1-10; mild, moderate, or severe)      moderate    7. ORAL INTAKE: If vomiting, \"Have you been able to drink liquids?\" \"How much liquids have you had in the past 24 hours?\"      Very poor, has been very nausea    8. HYDRATION: \"Any signs of dehydration?\" (e.g., dry mouth [not just dry lips], too weak to stand, dizziness, new weight loss) \"When did you last urinate?\"      Dizzy, lightheaded, generalized weakness    9. EXPOSURE: \"Have you traveled to a foreign country recently?\" \"Have you been exposed to anyone with diarrhea?\" \"Could you have eaten any food that was spoiled?\"      no      11. OTHER SYMPTOMS: \"Do you have any other symptoms?\" (e.g., fever, blood in stool)        Epigastric pain       Severe nausea        Decreased PO intake.    Protocols used: Abdominal Pain - Upper-Adult-AH, Diarrhea-Adult-AH    "

## 2024-09-17 RX ORDER — SUCRALFATE 1 G/1
1 TABLET ORAL 3 TIMES DAILY
Qty: 30 TABLET | Refills: 0 | Status: SHIPPED | OUTPATIENT
Start: 2024-09-17 | End: 2024-09-27

## 2024-09-17 RX ORDER — ONDANSETRON 4 MG/1
4 TABLET, FILM COATED ORAL EVERY 6 HOURS
Qty: 20 TABLET | Refills: 0 | Status: SHIPPED | OUTPATIENT
Start: 2024-09-17

## 2024-09-17 RX ORDER — FAMOTIDINE 20 MG/1
20 TABLET, FILM COATED ORAL 2 TIMES DAILY
Qty: 30 TABLET | Refills: 0 | Status: SHIPPED | OUTPATIENT
Start: 2024-09-17

## 2024-09-17 NOTE — ED PROVIDER NOTES
1. Pain of upper abdomen    2. Nausea      ED Disposition       ED Disposition   Discharge    Condition   Stable    Date/Time   Tue Sep 17, 2024 12:20 AM    Comment   Gita Rendon discharge to home/self care.                   Assessment & Plan       Medical Decision Making  I reviewed the patient's medical chart, PMHx, prior encounters, medications.    My DDx includes: Gastritis, PUD, cholecystitis, pancreatitis, diverticulitis, gastroenteritis, kidney stone, viral syndrome (including covid, flu, RSV). At risk for UTI, pyelonephritis.    Will obtain GI labs including CBC, CMP to evaluate electrolytes and kidney function, lipase to evaluate pancreas. Will check UA. Will treat supportively, reassess.     Labs unremarkable. Only partial improvement with meds for gastritis. Will proceed to CT scan.    Negative CT scan.    Will discharge with strict return precautions.    Amount and/or Complexity of Data Reviewed  Labs: ordered. Decision-making details documented in ED Course.  Radiology: ordered.    Risk  OTC drugs.  Prescription drug management.                  ED Course as of 09/17/24 2141   Mon Sep 16, 2024   2304 Carbon Dioxide(!): 19  Slight acidosis noted, will give fluids.   2309 SARS COV Rapid Antigen: Negative  Negative for covid.   2328 Control: Valid   2328 PREGNANCY TEST URINE: Negative   2333 WBC, UA: 0-1  Inconsistent with infection       Medications   Famotidine (PF) (PEPCID) injection 20 mg (20 mg Intravenous Given 9/16/24 2252)   ondansetron (ZOFRAN) injection 4 mg (4 mg Intravenous Given 9/16/24 2252)   aluminum-magnesium hydroxide-simethicone (MAALOX) oral suspension 30 mL (30 mL Oral Given 9/16/24 2252)   sodium chloride 0.9 % bolus 1,000 mL (0 mL Intravenous Stopped 9/17/24 0027)   iohexol (OMNIPAQUE) 350 MG/ML injection (MULTI-DOSE) 100 mL (100 mL Intravenous Given 9/16/24 2338)       History of Present Illness       39 y.o. female who presents for nausea, abdominal pain. This began 2 days  ago. She describes abdominal discomfort predominantly in a band-like distribution across the upper abdomen.  + no nausea,without vomiting, states that she is frequently belching. + diarrhea, no bloody bowel movements. no back pain, no dysuria/frequency, no chest pain. no fevers/chills. ROS otherwise negative.             Review of Systems   Constitutional:  Negative for chills and fever.   HENT:  Negative for congestion, rhinorrhea and sore throat.    Respiratory:  Negative for cough and shortness of breath.    Cardiovascular:  Negative for chest pain and palpitations.   Gastrointestinal:  Positive for abdominal pain, diarrhea and nausea. Negative for constipation and vomiting.   Genitourinary:  Negative for difficulty urinating and flank pain.   Musculoskeletal:  Negative for arthralgias.   Neurological:  Negative for dizziness, weakness, light-headedness and headaches.   Psychiatric/Behavioral:  Negative for agitation, behavioral problems and confusion.    All other systems reviewed and are negative.          Objective     ED Triage Vitals [09/16/24 2220]   Temperature Pulse Blood Pressure Respirations SpO2 Patient Position - Orthostatic VS   97.6 °F (36.4 °C) 66 169/100 18 98 % --      Temp Source Heart Rate Source BP Location FiO2 (%) Pain Score    Temporal Monitor -- -- 6        Physical Exam  Constitutional:       Appearance: She is well-developed.   HENT:      Head: Normocephalic and atraumatic.   Cardiovascular:      Rate and Rhythm: Normal rate and regular rhythm.      Heart sounds: Normal heart sounds. No murmur heard.     No friction rub.   Pulmonary:      Effort: Pulmonary effort is normal. No respiratory distress.      Breath sounds: Normal breath sounds. No wheezing or rales.   Abdominal:      General: Bowel sounds are normal. There is no distension.      Palpations: Abdomen is soft.      Tenderness: There is no abdominal tenderness. There is no right CVA tenderness or left CVA tenderness.       Comments: No other abdominal tenderness, no guarding/rigidity.   Musculoskeletal:         General: Normal range of motion.      Cervical back: Normal range of motion and neck supple.   Skin:     General: Skin is warm.   Neurological:      Mental Status: She is alert and oriented to person, place, and time.      Coordination: Coordination normal.   Psychiatric:         Behavior: Behavior normal.         Thought Content: Thought content normal.         Judgment: Judgment normal.         Labs Reviewed   CBC AND DIFFERENTIAL - Abnormal       Result Value    WBC 10.35 (*)     RBC 5.22 (*)     Hemoglobin 14.7      Hematocrit 46.5 (*)     MCV 89      MCH 28.2      MCHC 31.6      RDW 13.1      MPV 9.9      Platelets 279      nRBC 0      Segmented % 59      Immature Grans % 0      Lymphocytes % 31      Monocytes % 7      Eosinophils Relative 3      Basophils Relative 0      Absolute Neutrophils 6.00      Absolute Immature Grans 0.03      Absolute Lymphocytes 3.24      Absolute Monocytes 0.71      Eosinophils Absolute 0.34      Basophils Absolute 0.03     COMPREHENSIVE METABOLIC PANEL - Abnormal    Sodium 140      Potassium 3.6      Chloride 112 (*)     CO2 19 (*)     ANION GAP 9      BUN 28 (*)     Creatinine 0.86      Glucose 89      Calcium 9.3      AST 16      ALT 18      Alkaline Phosphatase 78      Total Protein 6.9      Albumin 4.3      Total Bilirubin 0.28      eGFR 84      Narrative:     National Kidney Disease Foundation guidelines for Chronic Kidney Disease (CKD):     Stage 1 with normal or high GFR (GFR > 90 mL/min/1.73 square meters)    Stage 2 Mild CKD (GFR = 60-89 mL/min/1.73 square meters)    Stage 3A Moderate CKD (GFR = 45-59 mL/min/1.73 square meters)    Stage 3B Moderate CKD (GFR = 30-44 mL/min/1.73 square meters)    Stage 4 Severe CKD (GFR = 15-29 mL/min/1.73 square meters)    Stage 5 End Stage CKD (GFR <15 mL/min/1.73 square meters)  Note: GFR calculation is accurate only with a steady state creatinine    UA W REFLEX TO MICROSCOPIC WITH REFLEX TO CULTURE - Abnormal    Color, UA Yellow      Clarity, UA Clear      Specific Gravity, UA >=1.030      pH, UA 5.5      Leukocytes, UA Negative      Nitrite, UA Negative      Protein, UA Trace (*)     Glucose, UA Negative      Ketones, UA Negative      Urobilinogen, UA 2.0 (*)     Bilirubin, UA Negative      Occult Blood, UA Moderate (*)    URINE MICROSCOPIC - Abnormal    RBC, UA 0-1      WBC, UA 0-1      Epithelial Cells Occasional      Bacteria, UA Occasional      MUCUS THREADS Moderate (*)     Ca Oxalate Alida, UA Moderate (*)    COVID-19/INFLUENZA A/B RAPID ANTIGEN (30 MIN.TAT) - Normal    SARS COV Rapid Antigen Negative      Influenza A Rapid Antigen Negative      Influenza B Rapid Antigen Negative      Narrative:     This test has been performed using the brick&mobile Yenni 2 FLU+SARS Antigen test under the Emergency Use Authorization (EUA). This test has been validated by the  and verified by the performing laboratory. The Yenni uses lateral flow immunofluorescent sandwich assay to detect SARS-COV, Influenza A and Influenza B Antigen.     The Quidel Yenni 2 SARS Antigen test does not differentiate between SARS-CoV and SARS-CoV-2.     Negative results are presumptive and may be confirmed with a molecular assay, if necessary, for patient management. Negative results do not rule out SARS-CoV-2 or influenza infection and should not be used as the sole basis for treatment or patient management decisions. A negative test result may occur if the level of antigen in a sample is below the limit of detection of this test.     Positive results are indicative of the presence of viral antigens, but do not rule out bacterial infection or co-infection with other viruses.     All test results should be used as an adjunct to clinical observations and other information available to the provider.    FOR PEDIATRIC PATIENTS - copy/paste COVID Guidelines URL to browser:  [email or web address]   LIPASE - Normal    Lipase 17     POCT PREGNANCY, URINE - Normal    EXT Preg Test, Ur Negative      Control Valid       CT abdomen pelvis w contrast   Final Interpretation by Gerhard Harris MD (09/16 7279)      No acute intra-abdominal/pelvic abnormalities noted with findings detailed above. No         Workstation performed: [id number]             Procedures       Chilo Paige MD  09/17/24 [id number]

## 2024-09-18 DIAGNOSIS — E66.01 MORBID OBESITY (HCC): ICD-10-CM

## 2024-09-20 ENCOUNTER — NURSE TRIAGE (OUTPATIENT)
Age: 39
End: 2024-09-20

## 2024-09-20 NOTE — TELEPHONE ENCOUNTER
Regarding: RECENT ED VISIT-STOMACH PAIN,NAUSEA, BURPING,DIARRHEA. RECENT CT  ----- Message from Sylvie DIETZ sent at 9/20/2024  1:47 PM EDT -----  Patients GI provider:  JARVIS AVILA    Number to return call: [phone]    Reason for call: Pt recently in ED. Complaints of diarrhea, burping, stomach and ABD pain, nausea, no appetite. Patient had recent CT scan. Please contact patient to further discuss.    Scheduled procedure/appointment date if applicable: 12/30/2024

## 2024-09-20 NOTE — TELEPHONE ENCOUNTER
"LOV 6/26/24 ROCKY Campbell (chronic constipation, BRBPR, GERD), Procedure colon 10/15/21, [facility] 9/16/24 upper abd pain, currently scheduled for appt 12/30/24.   Patient discharged on Zofran, famotidine, sucralfate.    Patient continues with symptoms as noted in ED report. She states she started with \"burping sulfur\" last weekend, upper abd pain radiates to lower, diarrhea, nausea. She has no appetite. She noted CT report with significant diverticulosis or evidence of diverticulitis, impression notes no acute abnormalities. I contacted radiology and confirmed no significant diverticulosis or evidence of diverticulitis and updated patient.    She is taking all medications as ordered and she continues on Linzess. I advised to just keep to clears for now. Please review/advise. Only urgent appointments available currently and next is 10/10/24. Please review/advise. Patient aware to report back to ED for worsening symptoms.    Reason for Disposition   MODERATE pain (e.g., interferes with normal activities that comes and goes (cramps) lasts > 24 hours (Exception: pain with Vomiting or Diarrhea - see that Protocol)    Answer Assessment - Initial Assessment Questions  1. LOCATION: \"Where does it hurt?\"       Upper abdomen  2. RADIATION: \"Does the pain shoot anywhere else?\" (e.g., chest, back)      Radiates to lower  3. ONSET: \"When did the pain begin?\" (e.g., minutes, hours or days ago)       Last weekdend  4. SUDDEN: \"Gradual or sudden onset?\"      Sudden onset  5. PATTERN \"Does the pain come and go, or is it constant?\"     - If constant: \"Is it getting better, staying the same, or worsening?\"       (Note: Constant means the pain never goes away completely; most serious pain is constant and it progresses)      - If intermittent: \"How long does it last?\" \"Do you have pain now?\"      (Note: Intermittent means the pain goes away completely between bouts)      constant  6. SEVERITY: \"How bad is the pain?\"  (e.g., Scale 1-10; " "mild, moderate, or severe)    - MILD (1-3): doesn't interfere with normal activities, abdomen soft and not tender to touch     - MODERATE (4-7): interferes with normal activities or awakens from sleep, tender to touch     - SEVERE (8-10): excruciating pain, doubled over, unable to do any normal activities       Moderate  8. CAUSE: \"What do you think is causing the stomach pain?\"      unknown  9. RELIEVING/AGGRAVATING FACTORS: \"What makes it better or worse?\" (e.g., movement, antacids, bowel movement)      Nothing helping at present, no change on meds  10. OTHER SYMPTOMS: \"Has there been any vomiting, diarrhea, constipation, or urine problems?\"        Nausea, no appetite  11. PREGNANCY: \"Is there any chance you are pregnant?\" \"When was your last menstrual period?\"        No    Protocols used: Abdominal Pain - Female-ADULT-[place]    "

## 2024-09-21 ENCOUNTER — HOSPITAL ENCOUNTER (EMERGENCY)
Facility: HOSPITAL | Age: 39
Discharge: HOME | End: 2024-09-21
Attending: EMERGENCY MEDICINE | Admitting: EMERGENCY MEDICINE
Payer: COMMERCIAL

## 2024-09-21 ENCOUNTER — NURSE TRIAGE (OUTPATIENT)
Dept: OTHER | Facility: OTHER | Age: 39
End: 2024-09-21

## 2024-09-21 VITALS
TEMPERATURE: 97.3 F | HEART RATE: 73 BPM | SYSTOLIC BLOOD PRESSURE: 153 MMHG | RESPIRATION RATE: 20 BRPM | OXYGEN SATURATION: 100 % | DIASTOLIC BLOOD PRESSURE: 80 MMHG

## 2024-09-21 DIAGNOSIS — R10.9 ABDOMINAL PAIN: Primary | ICD-10-CM

## 2024-09-21 DIAGNOSIS — R19.7 DIARRHEA: ICD-10-CM

## 2024-09-21 LAB
ALBUMIN SERPL BCG-MCNC: 4.3 G/DL (ref 3.5–5)
ALP SERPL-CCNC: 94 U/L (ref 34–104)
ALT SERPL W P-5'-P-CCNC: 22 U/L (ref 7–52)
ANION GAP SERPL CALCULATED.3IONS-SCNC: 9 MMOL/L (ref 4–13)
AST SERPL W P-5'-P-CCNC: 17 U/L (ref 13–39)
BACTERIA UR QL AUTO: NORMAL /HPF
BASOPHILS # BLD AUTO: 0.03 THOUSANDS/ΜL (ref 0–0.1)
BASOPHILS NFR BLD AUTO: 0 % (ref 0–1)
BILIRUB SERPL-MCNC: 0.29 MG/DL (ref 0.2–1)
BILIRUB UR QL STRIP: NEGATIVE
BUN SERPL-MCNC: 24 MG/DL (ref 5–25)
CALCIUM SERPL-MCNC: 9.2 MG/DL (ref 8.4–10.2)
CHLORIDE SERPL-SCNC: 110 MMOL/L (ref 96–108)
CLARITY UR: CLEAR
CO2 SERPL-SCNC: 19 MMOL/L (ref 21–32)
COLOR UR: YELLOW
CREAT SERPL-MCNC: 0.84 MG/DL (ref 0.6–1.3)
EOSINOPHIL # BLD AUTO: 0.39 THOUSAND/ΜL (ref 0–0.61)
EOSINOPHIL NFR BLD AUTO: 4 % (ref 0–6)
ERYTHROCYTE [DISTWIDTH] IN BLOOD BY AUTOMATED COUNT: 13 % (ref 11.6–15.1)
GFR SERPL CREATININE-BSD FRML MDRD: 87 ML/MIN/1.73SQ M
GLUCOSE SERPL-MCNC: 83 MG/DL (ref 65–140)
GLUCOSE UR STRIP-MCNC: NEGATIVE MG/DL
HCT VFR BLD AUTO: 48.7 % (ref 34.8–46.1)
HGB BLD-MCNC: 15.3 G/DL (ref 11.5–15.4)
HGB UR QL STRIP.AUTO: NEGATIVE
IMM GRANULOCYTES # BLD AUTO: 0.02 THOUSAND/UL (ref 0–0.2)
IMM GRANULOCYTES NFR BLD AUTO: 0 % (ref 0–2)
KETONES UR STRIP-MCNC: NEGATIVE MG/DL
LACTATE SERPL-SCNC: 1.1 MMOL/L (ref 0.5–2)
LEUKOCYTE ESTERASE UR QL STRIP: ABNORMAL
LIPASE SERPL-CCNC: 17 U/L (ref 11–82)
LYMPHOCYTES # BLD AUTO: 2.07 THOUSANDS/ΜL (ref 0.6–4.47)
LYMPHOCYTES NFR BLD AUTO: 20 % (ref 14–44)
MAGNESIUM SERPL-MCNC: 2 MG/DL (ref 1.9–2.7)
MCH RBC QN AUTO: 28 PG (ref 26.8–34.3)
MCHC RBC AUTO-ENTMCNC: 31.4 G/DL (ref 31.4–37.4)
MCV RBC AUTO: 89 FL (ref 82–98)
MONOCYTES # BLD AUTO: 0.49 THOUSAND/ΜL (ref 0.17–1.22)
MONOCYTES NFR BLD AUTO: 5 % (ref 4–12)
NEUTROPHILS # BLD AUTO: 7.32 THOUSANDS/ΜL (ref 1.85–7.62)
NEUTS SEG NFR BLD AUTO: 71 % (ref 43–75)
NITRITE UR QL STRIP: NEGATIVE
NON-SQ EPI CELLS URNS QL MICRO: NORMAL /HPF
NRBC BLD AUTO-RTO: 0 /100 WBCS
PH UR STRIP.AUTO: 6 [PH]
PLATELET # BLD AUTO: 218 THOUSANDS/UL (ref 149–390)
PMV BLD AUTO: 10.2 FL (ref 8.9–12.7)
POTASSIUM SERPL-SCNC: 3.7 MMOL/L (ref 3.5–5.3)
PROT SERPL-MCNC: 7.3 G/DL (ref 6.4–8.4)
PROT UR STRIP-MCNC: NEGATIVE MG/DL
RBC # BLD AUTO: 5.47 MILLION/UL (ref 3.81–5.12)
RBC #/AREA URNS AUTO: NORMAL /HPF
SODIUM SERPL-SCNC: 138 MMOL/L (ref 135–147)
SP GR UR STRIP.AUTO: 1.02 (ref 1–1.03)
UROBILINOGEN UR STRIP-ACNC: 2 MG/DL
WBC # BLD AUTO: 10.32 THOUSAND/UL (ref 4.31–10.16)
WBC #/AREA URNS AUTO: NORMAL /HPF

## 2024-09-21 PROCEDURE — 85025 COMPLETE CBC W/AUTO DIFF WBC: CPT | Performed by: PHYSICIAN ASSISTANT

## 2024-09-21 PROCEDURE — 99283 EMERGENCY DEPT VISIT LOW MDM: CPT

## 2024-09-21 PROCEDURE — 80053 COMPREHEN METABOLIC PANEL: CPT | Performed by: PHYSICIAN ASSISTANT

## 2024-09-21 PROCEDURE — 99285 EMERGENCY DEPT VISIT HI MDM: CPT | Performed by: PHYSICIAN ASSISTANT

## 2024-09-21 PROCEDURE — 83605 ASSAY OF LACTIC ACID: CPT | Performed by: PHYSICIAN ASSISTANT

## 2024-09-21 PROCEDURE — 83690 ASSAY OF LIPASE: CPT | Performed by: PHYSICIAN ASSISTANT

## 2024-09-21 PROCEDURE — 81001 URINALYSIS AUTO W/SCOPE: CPT | Performed by: PHYSICIAN ASSISTANT

## 2024-09-21 PROCEDURE — 36415 COLL VENOUS BLD VENIPUNCTURE: CPT | Performed by: PHYSICIAN ASSISTANT

## 2024-09-21 PROCEDURE — 96374 THER/PROPH/DIAG INJ IV PUSH: CPT

## 2024-09-21 PROCEDURE — 96361 HYDRATE IV INFUSION ADD-ON: CPT

## 2024-09-21 PROCEDURE — 83735 ASSAY OF MAGNESIUM: CPT | Performed by: PHYSICIAN ASSISTANT

## 2024-09-21 RX ORDER — ONDANSETRON 2 MG/ML
4 INJECTION INTRAMUSCULAR; INTRAVENOUS ONCE
Status: COMPLETED | OUTPATIENT
Start: 2024-09-21 | End: 2024-09-21

## 2024-09-21 RX ADMIN — SODIUM CHLORIDE 1000 ML: 0.9 INJECTION, SOLUTION INTRAVENOUS at 13:22

## 2024-09-21 RX ADMIN — ONDANSETRON 4 MG: 2 INJECTION INTRAMUSCULAR; INTRAVENOUS at 13:23

## 2024-09-21 NOTE — TELEPHONE ENCOUNTER
"Reason for Disposition   [1] MILD-MODERATE pain AND [2] constant AND [3] present > 2 hours    Answer Assessment - Initial Assessment Questions  1. LOCATION: \"Where does it hurt?\"       Abdomin, lower and all over  2. RADIATION: \"Does the pain shoot anywhere else?\" (e.g., chest, back)      Denies  3. ONSET: \"When did the pain begin?\" (e.g., minutes, hours or days ago)       Thursday of previous week  4. SUDDEN: \"Gradual or sudden onset?\"      gradual  5. PATTERN \"Does the pain come and go, or is it constant?\"      constant  6. SEVERITY: \"How bad is the pain?\"  (e.g., Scale 1-10; mild, moderate, or severe)      6/10  7. RECURRENT SYMPTOM: \"Have you ever had this type of stomach pain before?\" If Yes, ask: \"When was the last time?\" and \"What happened that time?\"       Chronic for last two weeks  8. CAUSE: \"What do you think is causing the stomach pain?\"      unknown  9. RELIEVING/AGGRAVATING FACTORS: \"What makes it better or worse?\" (e.g., antacids, bending or twisting motion, bowel movement)     Certain foods make it worse  10. OTHER SYMPTOMS: \"Do you have any other symptoms?\" (e.g., back pain, diarrhea, fever, urination pain, vomiting)        Burning in stomach, increased flatulence.    Protocols used: Abdominal Pain - Female-Adult-    Previous patient encounters recommending the patient be seen back in ER with increase in symptoms. Informed patient of that information and our protocols. Patient requested to be listed on emergency track board for 1pm.  "

## 2024-09-21 NOTE — ED PROVIDER NOTES
1. Abdominal pain    2. Diarrhea      ED Disposition       ED Disposition   Discharge    Condition   Stable    Date/Time   Sat Sep 21, 2024  3:58 PM    Comment   Gita Rendon discharge to home/self care.                   Assessment & Plan       Medical Decision Making  39-year-old female here for repeat evaluation of abdominal pain.  Symptoms started 7 days ago with diarrhea.  She states that she believes she consumed bad food at that point.  She had a workup including a CT scan which was normal.  She states she phoned her primary care's office and health call who advised her to come back to the ER for yet another visit.    Offered to test stools since she claims she has had liquid stool that is increasing over the last 7 days but she states she does not feel as though that she will be able to provide the sample.  I would not recommend repeat imaging with a normal CT scan within the last 5 days at this time.  We will repeat some labs to check for hydration status check for infectious status recheck urine again offer stool studies.  Differential diagnosis includes gastroenteritis, diverticulitis, appendicitis, cholecystitis.  Workup is grossly unremarkable    CBC nearly identical from 5 days ago metabolic panel lipase normal urine is clean.  Vital signs reviewed she is in no acute distress she is stable for outpatient management.  She was unable to give stool sample here therefore stool cultures ordered as an outpatient.    Amount and/or Complexity of Data Reviewed  Labs: ordered. Decision-making details documented in ED Course.    Risk  Prescription drug management.                ED Course as of 09/21/24 1603   Sat Sep 21, 2024   1339 CBC CMP reviewed essentially unchanged laboratory values magnesium lactic acid lipase all normal   1429 Awaiting urine and stool otherwise labs are within normal limits   1511 Bacteria, UA: Occasional   1511 Epithelial Cells: Occasional   1511 WBC, UA: 0-1   1511 RBC Urine: None  Seen   1539 Bacteria, UA: Occasional   1539 WBC, UA: 0-1       Medications   sodium chloride 0.9 % bolus 1,000 mL (0 mL Intravenous Stopped 9/21/24 1427)   ondansetron (ZOFRAN) injection 4 mg (4 mg Intravenous Given 9/21/24 1323)       History of Present Illness       This is a 39-year-old female presenting to the emergency department today for evaluation of ongoing abdominal pain.  She was seen here 5 days ago had a broad workup including a CT scan which was normal she was sent home on some supportive GI medications.  Onset of symptoms is now about 7 days ago.    Patient admits to mid to lower abdominal burning sensation.  No chest pain or shortness of breath.  She has had ongoing watery brown diarrhea denies black or red contents no mucus contents.  She is nauseous but she has not vomited. she has not had prior abdominal surgery.    She states that she called her family doctor health call and they told her to go back to the ER for another visit.    She is nontoxic-appearing and  bedside, vital signs reviewed mildly hypertensive otherwise within reasonable limits.          Review of Systems        Objective     ED Triage Vitals [09/21/24 1252]   Temperature Pulse Blood Pressure Respirations SpO2 Patient Position - Orthostatic VS   (!) 97.3 °F (36.3 °C) 83 (!) 184/105 18 99 % Sitting      Temp Source Heart Rate Source BP Location FiO2 (%) Pain Score    Temporal Monitor Right arm -- 5        Physical Exam    Labs Reviewed   CBC AND DIFFERENTIAL - Abnormal       Result Value    WBC 10.32 (*)     RBC 5.47 (*)     Hemoglobin 15.3      Hematocrit 48.7 (*)     MCV 89      MCH 28.0      MCHC 31.4      RDW 13.0      MPV 10.2      Platelets 218      nRBC 0      Segmented % 71      Immature Grans % 0      Lymphocytes % 20      Monocytes % 5      Eosinophils Relative 4      Basophils Relative 0      Absolute Neutrophils 7.32      Absolute Immature Grans 0.02      Absolute Lymphocytes 2.07      Absolute Monocytes 0.49       Eosinophils Absolute 0.39      Basophils Absolute 0.03     COMPREHENSIVE METABOLIC PANEL - Abnormal    Sodium 138      Potassium 3.7      Chloride 110 (*)     CO2 19 (*)     ANION GAP 9      BUN 24      Creatinine 0.84      Glucose 83      Calcium 9.2      AST 17      ALT 22      Alkaline Phosphatase 94      Total Protein 7.3      Albumin 4.3      Total Bilirubin 0.29      eGFR 87      Narrative:     National Kidney Disease Foundation guidelines for Chronic Kidney Disease (CKD):     Stage 1 with normal or high GFR (GFR > 90 mL/min/1.73 square meters)    Stage 2 Mild CKD (GFR = 60-89 mL/min/1.73 square meters)    Stage 3A Moderate CKD (GFR = 45-59 mL/min/1.73 square meters)    Stage 3B Moderate CKD (GFR = 30-44 mL/min/1.73 square meters)    Stage 4 Severe CKD (GFR = 15-29 mL/min/1.73 square meters)    Stage 5 End Stage CKD (GFR <15 mL/min/1.73 square meters)  Note: GFR calculation is accurate only with a steady state creatinine   URINALYSIS WITH REFLEX TO SCOPE - Abnormal    Color, UA Yellow      Clarity, UA Clear      Specific Gravity, UA 1.020      pH, UA 6.0      Leukocytes, UA Trace (*)     Nitrite, UA Negative      Protein, UA Negative      Glucose, UA Negative      Ketones, UA Negative      Urobilinogen, UA 2.0 (*)     Bilirubin, UA Negative      Occult Blood, UA Negative     LIPASE - Normal    Lipase 17     LACTIC ACID, PLASMA (W/REFLEX IF RESULT > 2.0) - Normal    LACTIC ACID 1.1      Narrative:     Result may be elevated if tourniquet was used during collection.   MAGNESIUM - Normal    Magnesium 2.0     URINE MICROSCOPIC - Normal    RBC, UA None Seen      WBC, UA 0-1      Epithelial Cells Occasional      Bacteria, UA Occasional       No orders to display       Procedures    ED Medication and Procedure Management   Prior to Admission Medications   Prescriptions Last Dose Informant Patient Reported? Taking?   Atogepant (Qulipta) 60 MG TABS  Self Yes No   Sig: Take 60 mg by mouth in the morning   Cequa 0.09 %  SOLN 2024 Self No Yes   Sig: Apply 1 drop to eye 2 (two) times a day   DULoxetine (CYMBALTA) 60 mg delayed release capsule 2024  No Yes   Sig: Take 1 capsule (60 mg total) by mouth daily   Mirabegron ER 50 MG TB24   No No   Sig: Take 1 tablet (50 mg total) by mouth in the morning   Multiple Vitamin (MULTIVITAMIN) capsule 2024 Self Yes Yes   Sig: Take 1 capsule by mouth daily   Semaglutide-Weight Management (WEGOVY) 1.7 MG/0.75ML Past Month  No Yes   Sig: Inject 0.75 mL (1.7 mg total) under the skin once a week   acetaZOLAMIDE (DIAMOX) 250 mg tablet 2024  No Yes   Sig: take 2 tablets by mouth twice a day   almotriptan (AXERT) 12.5 MG tablet Not Taking Self Yes No   Sig: Take 12.5 mg by mouth once as needed for migraine may repeat in 2 hours if needed.  Limited to 2 x a week   Patient not taking: Reported on 2024   cariprazine (VRAYLAR) 6 MG capsule  Self No No   Sig: Take 1 capsule (6 mg total) by mouth daily   celecoxib (CeleBREX) 200 mg capsule 2024 Self No Yes   Sig: Take 1 capsule (200 mg total) by mouth 2 (two) times a day   cetirizine (ZyrTEC) 10 MG chewable tablet 2024 Self Yes Yes   Sig: Chew 10 mg daily   cholecalciferol 1,000 units tablet 2024 Self No Yes   Sig: Take 5 tablets (5,000 Units total) by mouth daily   famotidine (PEPCID) 20 mg tablet   No No   Sig: Take 1 tablet (20 mg total) by mouth 2 (two) times a day   gabapentin (NEURONTIN) 300 mg capsule 2024  No Yes   Sig: Take 1 capsule (300 mg total) by mouth daily after breakfast   gabapentin (NEURONTIN) 600 MG tablet 2024 Self No Yes   Sig: Take 1.5 tablets (900 mg total) by mouth daily at bedtime   hydrocortisone (ANUSOL-HC) 2.5 % rectal cream   No No   Sig: Apply topically 2 (two) times a day   ipratropium (ATROVENT) 0.03 % nasal spray   No No   Si sprays into each nostril every 12 (twelve) hours   levothyroxine 112 mcg tablet 2024 Self No Yes   Sig: Take 1 tablet (112 mcg total) by mouth  daily in the early morning   linaCLOtide (Linzess) 290 MCG CAPS 9/21/2024  No Yes   Sig: Take 1 capsule by mouth daily   metFORMIN (GLUCOPHAGE) 500 mg tablet 9/21/2024  No Yes   Sig: Take 1 tablet (500 mg total) by mouth daily with breakfast   omeprazole (PriLOSEC) 20 mg delayed release capsule 9/21/2024  No Yes   Sig: Take 1 capsule (20 mg total) by mouth 2 (two) times a day   ondansetron (ZOFRAN) 4 mg tablet   No No   Sig: Take 1 tablet (4 mg total) by mouth every 6 (six) hours   prochlorperazine (COMPAZINE) 10 mg tablet  Self Yes No   Sig: Take 10 mg by mouth every 6 (six) hours as needed for nausea or vomiting Take 1 tablet by TID if needed for headache or nausea. Limit to 2x a week .   spironolactone (ALDACTONE) 25 mg tablet   No No   Sig: Take 1 tablet (25 mg total) by mouth daily   sucralfate (CARAFATE) 1 g tablet   No No   Sig: Take 1 tablet (1 g total) by mouth 3 (three) times a day for 10 days   topiramate (TOPAMAX) 100 mg tablet 9/21/2024 Self Yes Yes   Sig: TAKE 1.5 TABLETS BID      Facility-Administered Medications: None     Patient's Medications   Discharge Prescriptions    No medications on file     Outpatient Discharge Orders   Stool Enteric Bacterial Panel by PCR   Standing Status: Future Standing Exp. Date: 09/21/25     Clostridium difficile toxin by PCR with EIA   Standing Status: Future Standing Exp. Date: 09/21/25        Stewart Wu PA-C  09/21/24 [id number]

## 2024-09-21 NOTE — TELEPHONE ENCOUNTER
"Regarding: stomach issues/nauseous/burning sensation  ----- Message from Odalys RUIZ sent at 9/21/2024 10:06 AM EDT -----  \"I have been having stomach issues since monday. I had talked to  a nurse on Monday and  was told to go to the ER and I did. They gave me medication but I'm not feeling any better. I have a burning sensation near my abdomen. And have been feeling nauseous\"    "

## 2024-09-22 DIAGNOSIS — I10 HYPERTENSION, UNSPECIFIED TYPE: ICD-10-CM

## 2024-09-23 ENCOUNTER — TELEPHONE (OUTPATIENT)
Dept: FAMILY MEDICINE CLINIC | Facility: CLINIC | Age: 39
End: 2024-09-23

## 2024-09-23 RX ORDER — SPIRONOLACTONE 25 MG/1
25 TABLET ORAL DAILY
Qty: 30 TABLET | Refills: 5 | Status: SHIPPED | OUTPATIENT
Start: 2024-09-23

## 2024-09-23 NOTE — TELEPHONE ENCOUNTER
Sent My Chart Message and called patient and left a message as well.      Good Morning Elizabeth,    We received your message and tried to contact you this morning regarding a sooner appointment. We have a few openings today. Please contact the office to schedule an appointment ASAP.     Thank you  [facility], [place]

## 2024-09-24 NOTE — TELEPHONE ENCOUNTER
Glad to hear her diarrhea has resolved. In addition to the increase in the famotidine (from 20mg -> 40mg), we could cautiously try something that may decrease the abdominal cramping. It generally can help with lower abdominal discomfort if this may be secondary to spasm/irritation. This is called dicyclomine. However, I would want her to use this with caution given it can cause constipation in some patients. If she would be interested in trying please do let me know.

## 2024-09-24 NOTE — TELEPHONE ENCOUNTER
She could also trial IB Mayte, which is a natural supplement that helps to relax intestinal cramping. For some patients however it can worsen reflux. She could also try OTC Gas-x, Beano to see if this helps. Can the pt call us end of week to provide us with an update?

## 2024-09-24 NOTE — TELEPHONE ENCOUNTER
Called and spoke with patient she would not like to try the medication for cramping at this time being she has a problem with constipation the way it is.

## 2024-09-24 NOTE — TELEPHONE ENCOUNTER
I did review ER evaluation and CT scan.   I would recommend omeprazole 20mg twice daily, famotidine 40mg before bed.   Sucralfate 3-4 times daily.   Clear liquid diet. Zofran as needed.   If having diarrhea, would temporarily hold the linzess.   Is the reena knew? Any other new medications?   Avoid all NSAIDs and celebrex (which is on her medication list)

## 2024-09-24 NOTE — TELEPHONE ENCOUNTER
Elizabeth said that the wegovy is not new and she is not on any other new medications. She does not have the pain that she had on Saturday. Now the pain is in her lower abd and lower left side and she has a little soreness. She has no diarrhea as of Sunday.

## 2024-09-25 ENCOUNTER — TELEPHONE (OUTPATIENT)
Dept: FAMILY MEDICINE CLINIC | Facility: CLINIC | Age: 39
End: 2024-09-25

## 2024-09-25 NOTE — TELEPHONE ENCOUNTER
My Chart message sent to patient.    Good Afternoon Elizabeth,    We wanted to follow up regarding the message that you are still having problems.  Please contact the office at your earliest convenience so we can offer you a sooner appointment.       Thank you.    Please offer any Provider in [facility]. We also have residents  Thank you

## 2024-09-25 NOTE — TELEPHONE ENCOUNTER
I called Elizabeth, she has no more cramping and the left lower pain is going away. She said that things are improving and she will call us on Friday with an update.

## 2024-09-27 ENCOUNTER — TELEPHONE (OUTPATIENT)
Dept: OTHER | Facility: HOSPITAL | Age: 39
End: 2024-09-27

## 2024-09-27 ENCOUNTER — OFFICE VISIT (OUTPATIENT)
Dept: FAMILY MEDICINE CLINIC | Facility: CLINIC | Age: 39
End: 2024-09-27
Payer: COMMERCIAL

## 2024-09-27 VITALS
DIASTOLIC BLOOD PRESSURE: 82 MMHG | WEIGHT: 293 LBS | HEIGHT: 67 IN | OXYGEN SATURATION: 98 % | TEMPERATURE: 97.4 F | BODY MASS INDEX: 45.99 KG/M2 | HEART RATE: 78 BPM | SYSTOLIC BLOOD PRESSURE: 124 MMHG

## 2024-09-27 DIAGNOSIS — G93.2 INTRACRANIAL HYPERTENSION: ICD-10-CM

## 2024-09-27 DIAGNOSIS — G93.2 INTRACRANIAL HYPERTENSION: Primary | ICD-10-CM

## 2024-09-27 DIAGNOSIS — G43.709 CHRONIC MIGRAINE WITHOUT AURA WITHOUT STATUS MIGRAINOSUS, NOT INTRACTABLE: ICD-10-CM

## 2024-09-27 PROCEDURE — G2211 COMPLEX E/M VISIT ADD ON: HCPCS | Performed by: FAMILY MEDICINE

## 2024-09-27 PROCEDURE — 99214 OFFICE O/P EST MOD 30 MIN: CPT | Performed by: FAMILY MEDICINE

## 2024-09-27 RX ORDER — FLUVOXAMINE MALEATE 50 MG
50 TABLET ORAL DAILY
COMMUNITY
Start: 2024-09-23

## 2024-09-27 RX ORDER — RIMEGEPANT SULFATE 75 MG/75MG
75 TABLET, ORALLY DISINTEGRATING ORAL
COMMUNITY
Start: 2024-09-03

## 2024-09-27 RX ORDER — ACETAZOLAMIDE 250 MG/1
500 TABLET ORAL 2 TIMES DAILY
Qty: 360 TABLET | Refills: 0 | Status: SHIPPED | OUTPATIENT
Start: 2024-09-27

## 2024-09-27 RX ORDER — FREMANEZUMAB-VFRM 225 MG/1.5ML
225 INJECTION SUBCUTANEOUS
COMMUNITY
Start: 2024-09-11

## 2024-09-27 NOTE — ASSESSMENT & PLAN NOTE
-Overall migraine headaches continue at baseline patient is following with [facility] neurology, with some changes to pharmacotherapy  -Patient noting an exacerbation yesterday with bandlike pressure and a pushing down sensation

## 2024-09-27 NOTE — TELEPHONE ENCOUNTER
"Pt providing update; \"feeling ok; things have  down\". Decreased appetite and some nausea yesterday however took Wegovy injection Wed. No other concerns at this time.      "

## 2024-09-27 NOTE — TELEPHONE ENCOUNTER
As symptoms are improving, I think it would be reasonable to continue to monitor at this time. She can reach out to us if symptoms worsen.

## 2024-09-27 NOTE — TELEPHONE ENCOUNTER
I called and spoke with Elizabeth. She states she thinks it may be intracranial HTN. She confirmed that she is taking Diamox 500 mg 2x a day. She is aware to contact her Neurologist at her earliest convenience.

## 2024-09-27 NOTE — PROGRESS NOTES
Ambulatory Visit  Name: Gita Rendon      : 1985      MRN: [id number]  Encounter Provider: Norma Traore MD  Encounter Date: 2024   Encounter department: [facility]    Assessment & Plan  Intracranial hypertension  -Reporting some headaches are starting to feel slightly different more so a pressure type sensation or a downward push on top of her head than previous headaches  -Given history of intracranial hypertension PCP did recommend that patient discuss possibility of titration with acetazolamide during nephrology upcoming appointment.  PCP did send message to nephrology team regarding the above.         Chronic migraine without aura without status migrainosus, not intractable  -Overall migraine headaches continue at baseline patient is following with [facility] neurology, with some changes to pharmacotherapy  -Patient noting an exacerbation yesterday with bandlike pressure and a pushing down sensation          History of Present Illness     Patient returns for follow-up of ED visit from 2024 for abdominal pain  Patient reports that since discharge she has had slightly decreased appetite but denies any nausea, vomiting, recurrent abdominal pain or fever/chills  PCP discussed with patient that likely had either food poisoning or viral gastroenteritis, continue symptomatic care at this time    Additionally, patient continues to follow-up with [facility] neurology for refractory migraines  Patient reports that she has had some changes to pharmacotherapy but overall does not feel significant improvement in migraine management  Patient reports that just yesterday experienced migraine but felt this was slightly different than baseline.  Patient noting that she had a pressure type sensation pushing down at the top of her head  Given history of intracranial pathic hypertension PCP discussed with patient that would discuss this further at upcoming  "nephrology appointment given possibility of multifactorial migraine headache contribution i.e. possible worsening of IH which she has agreed to bring up with Nephology team        History obtained from : patient  Review of Systems   Neurological:  Positive for headaches.     Pertinent Medical History             Objective     /82   Pulse 78   Temp (!) 97.4 °F (36.3 °C)   Ht 5' 7\" (1.702 m)   Wt (!) 145 kg (319 lb 6.4 oz)   LMP 09/15/2024 (Exact Date)   SpO2 98%   BMI 50.03 kg/m²     Physical Exam  Constitutional:       Appearance: She is well-developed.   HENT:      Head: Normocephalic and atraumatic.   Eyes:      Conjunctiva/sclera: Conjunctivae normal.   Cardiovascular:      Rate and Rhythm: Normal rate and regular rhythm.      Heart sounds: Normal heart sounds.   Pulmonary:      Effort: Pulmonary effort is normal.      Breath sounds: Normal breath sounds.   Abdominal:      General: Bowel sounds are normal.      Palpations: Abdomen is soft.   Musculoskeletal:      Cervical back: Normal range of motion and neck supple.   Skin:     General: Skin is warm and dry.   Neurological:      Mental Status: She is alert and oriented to person, place, and time.       "

## 2024-09-27 NOTE — ASSESSMENT & PLAN NOTE
-Reporting some headaches are starting to feel slightly different more so a pressure type sensation or a downward push on top of her head than previous headaches  -Given history of intracranial hypertension PCP did recommend that patient discuss possibility of titration with acetazolamide during nephrology upcoming appointment.  PCP did send message to nephrology team regarding the above.

## 2024-09-27 NOTE — TELEPHONE ENCOUNTER
Pt called in returning missed call & messages.     Pt states that she is currently unable to schedule for an earlier appt, as her car is currently in the shop, and she does not know when she will get it back. Under major repair. If she happens to receive her car back earlier than expected, she will call back to r/s for sooner appt, but at this time, she will keep the 10/07 appt.    Lastly, pt stated that she will contact the supply company, and inform them to refax paperwork. To please message her once the office receives it.     For any further information or questions, please call pt. Thank you!    Elizabeth Rendon   [phone]

## 2024-09-27 NOTE — TELEPHONE ENCOUNTER
Please call patient, I heard from her family doctor some concern about worsening intracranial HTN. Please ask if she is taking diamox 500mg twice a day.   Please advise her to contact her neurologist ASAP as they should be involved in medication titration.

## 2024-09-29 DIAGNOSIS — N32.81 OVERACTIVE BLADDER: ICD-10-CM

## 2024-09-29 RX ORDER — MIRABEGRON 50 MG/1
50 TABLET, EXTENDED RELEASE ORAL DAILY
Qty: 30 TABLET | Refills: 0 | Status: SHIPPED | OUTPATIENT
Start: 2024-09-29

## 2024-09-30 NOTE — TELEPHONE ENCOUNTER
I called and left a VM for Elizabeth to call the office back to discuss if she made medication changes and to see if she contacted Neurology for a follow up.

## 2024-10-02 NOTE — TELEPHONE ENCOUNTER
I called and left a VM for Elizabeth to call the office back to discuss if she made changes with her Diamox medication and to see if she contacted Neurology yet.

## 2024-10-07 ENCOUNTER — NURSE TRIAGE (OUTPATIENT)
Age: 39
End: 2024-10-07

## 2024-10-07 ENCOUNTER — TELEPHONE (OUTPATIENT)
Age: 39
End: 2024-10-07

## 2024-10-07 DIAGNOSIS — I10 PRIMARY HYPERTENSION: Primary | Chronic | ICD-10-CM

## 2024-10-07 DIAGNOSIS — G93.2 INTRACRANIAL HYPERTENSION: ICD-10-CM

## 2024-10-07 RX ORDER — ACETAZOLAMIDE 250 MG/1
750 TABLET ORAL 2 TIMES DAILY
Qty: 360 TABLET | Refills: 1 | Status: SHIPPED | OUTPATIENT
Start: 2024-10-07 | End: 2024-10-08

## 2024-10-07 NOTE — TELEPHONE ENCOUNTER
"Pt reports her headache has been going on for several days now. Denies chest pain, SOB, and numbness/tingling. Pt reports she has utilized all he medications for her headache and nothing is relieving this episode.  Per protocol, Triage nurse attempt to make an appt and spoke with office for further guidance. Pt has transportation issues and can't drive.   Triage nurse asked for a virtual. Office staff got patient in for 10/8/24. Triage nurse/staff informed pt if this worsens to go to the ER. Call 911 for ambulance transportation. Pt verbalized understanding.  PCP please be aware of pts s/s and appt.  Thank you  Reason for Disposition   SEVERE headache and not relieved by pain meds    Answer Assessment - Initial Assessment Questions  1. LOCATION: \"Where does it hurt?\"       Mostly front  2. ONSET: \"When did the headache start?\" (Minutes, hours or days)         Oct.3  3. PATTERN: \"Does the pain come and go, or has it been constant since it started?\"      constant  4. SEVERITY: \"How bad is the pain?\" and \"What does it keep you from doing?\"  (e.g., Scale 1-10; mild, moderate, or severe)    - MILD (1-3): doesn't interfere with normal activities     - MODERATE (4-7): interferes with normal activities or awakens from sleep     - SEVERE (8-10): excruciating pain, unable to do any normal activities         7  5. RECURRENT SYMPTOM: \"Have you ever had headaches before?\" If Yes, ask: \"When was the last time?\" and \"What happened that time?\"       Yes diagnosed with migraines    6. CAUSE: \"What do you think is causing the headache?\"      unsure  7. MIGRAINE: \"Have you been diagnosed with migraine headaches?\" If Yes, ask: \"Is this headache similar?\"       yes  8. HEAD INJURY: \"Has there been any recent injury to the head?\"       denies  9. OTHER SYMPTOMS: \"Do you have any other symptoms?\" (fever, stiff neck, eye pain, sore throat, cold symptoms)      Little pain in eyes, denies rest of s/s  10. PREGNANCY: \"Is there any chance you " "are pregnant?\" \"When was your last menstrual period?\"        2 weeks ago    Protocols used: Headache-ADULT-[place]    "

## 2024-10-07 NOTE — TELEPHONE ENCOUNTER
I called and spoke with patient, she is aware of medication dosage change.   Yes lets start with increasing by 250mg twice a day for now and see how you feel. If you tolerate okay we can increase by 500mg twice a day which is doubling dose,  We will check blood work in 1 week.  OFFICE -PLEASE ORDER BMP   Patient asked if she can have a new script sent to pharmacy do to increased dosage.

## 2024-10-07 NOTE — TELEPHONE ENCOUNTER
Hello-     I did get in touch with my Neurologist and heard back.  She is okay with my Diamox dose being adjusted as you mentioned- basically doubled daily.  I did not make any adjustments to my dosage before I heard back from her.  I can start doing so this weekend.       Thank you,  Elizabeth Rendon

## 2024-10-08 DIAGNOSIS — G93.2 INTRACRANIAL HYPERTENSION: ICD-10-CM

## 2024-10-08 RX ORDER — ACETAZOLAMIDE 250 MG/1
750 TABLET ORAL 2 TIMES DAILY
Qty: 540 TABLET | Refills: 0 | Status: SHIPPED | OUTPATIENT
Start: 2024-10-08

## 2024-10-09 ENCOUNTER — APPOINTMENT (EMERGENCY)
Dept: RADIOLOGY | Facility: HOSPITAL | Age: 39
End: 2024-10-09
Payer: COMMERCIAL

## 2024-10-09 ENCOUNTER — HOSPITAL ENCOUNTER (EMERGENCY)
Facility: HOSPITAL | Age: 39
Discharge: HOME | End: 2024-10-10
Payer: COMMERCIAL

## 2024-10-09 ENCOUNTER — APPOINTMENT (EMERGENCY)
Dept: CT IMAGING | Facility: HOSPITAL | Age: 39
End: 2024-10-09
Payer: COMMERCIAL

## 2024-10-09 DIAGNOSIS — R07.89 ATYPICAL CHEST PAIN: ICD-10-CM

## 2024-10-09 DIAGNOSIS — R53.83 FATIGUE: ICD-10-CM

## 2024-10-09 DIAGNOSIS — R51.9 HEADACHE: Primary | ICD-10-CM

## 2024-10-09 LAB
ALBUMIN SERPL BCG-MCNC: 4.1 G/DL (ref 3.5–5)
ALP SERPL-CCNC: 86 U/L (ref 34–104)
ALT SERPL W P-5'-P-CCNC: 26 U/L (ref 7–52)
ANION GAP SERPL CALCULATED.3IONS-SCNC: 11 MMOL/L (ref 4–13)
AST SERPL W P-5'-P-CCNC: 24 U/L (ref 13–39)
BASOPHILS # BLD AUTO: 0.07 THOUSANDS/ΜL (ref 0–0.1)
BASOPHILS NFR BLD AUTO: 1 % (ref 0–1)
BILIRUB SERPL-MCNC: 0.32 MG/DL (ref 0.2–1)
BILIRUB UR QL STRIP: NEGATIVE
BUN SERPL-MCNC: 26 MG/DL (ref 5–25)
CALCIUM SERPL-MCNC: 9 MG/DL (ref 8.4–10.2)
CARDIAC TROPONIN I PNL SERPL HS: 3 NG/L
CHLORIDE SERPL-SCNC: 111 MMOL/L (ref 96–108)
CLARITY UR: CLEAR
CO2 SERPL-SCNC: 16 MMOL/L (ref 21–32)
COLOR UR: YELLOW
CREAT SERPL-MCNC: 0.73 MG/DL (ref 0.6–1.3)
EOSINOPHIL # BLD AUTO: 0.85 THOUSAND/ΜL (ref 0–0.61)
EOSINOPHIL NFR BLD AUTO: 6 % (ref 0–6)
ERYTHROCYTE [DISTWIDTH] IN BLOOD BY AUTOMATED COUNT: 13.4 % (ref 11.6–15.1)
EXT PREGNANCY TEST URINE: NEGATIVE
EXT. CONTROL: NORMAL
GFR SERPL CREATININE-BSD FRML MDRD: 103 ML/MIN/1.73SQ M
GLUCOSE SERPL-MCNC: 96 MG/DL (ref 65–140)
GLUCOSE UR STRIP-MCNC: NEGATIVE MG/DL
HCT VFR BLD AUTO: 46.8 % (ref 34.8–46.1)
HGB BLD-MCNC: 14.6 G/DL (ref 11.5–15.4)
HGB UR QL STRIP.AUTO: NEGATIVE
IMM GRANULOCYTES # BLD AUTO: 0.06 THOUSAND/UL (ref 0–0.2)
IMM GRANULOCYTES NFR BLD AUTO: 0 % (ref 0–2)
KETONES UR STRIP-MCNC: NEGATIVE MG/DL
LEUKOCYTE ESTERASE UR QL STRIP: NEGATIVE
LYMPHOCYTES # BLD AUTO: 3.43 THOUSANDS/ΜL (ref 0.6–4.47)
LYMPHOCYTES NFR BLD AUTO: 24 % (ref 14–44)
MAGNESIUM SERPL-MCNC: 1.9 MG/DL (ref 1.9–2.7)
MCH RBC QN AUTO: 27.9 PG (ref 26.8–34.3)
MCHC RBC AUTO-ENTMCNC: 31.2 G/DL (ref 31.4–37.4)
MCV RBC AUTO: 90 FL (ref 82–98)
MONOCYTES # BLD AUTO: 0.9 THOUSAND/ΜL (ref 0.17–1.22)
MONOCYTES NFR BLD AUTO: 6 % (ref 4–12)
NEUTROPHILS # BLD AUTO: 9.03 THOUSANDS/ΜL (ref 1.85–7.62)
NEUTS SEG NFR BLD AUTO: 63 % (ref 43–75)
NITRITE UR QL STRIP: NEGATIVE
NRBC BLD AUTO-RTO: 0 /100 WBCS
PH UR STRIP.AUTO: 5.5 [PH]
PLATELET # BLD AUTO: 258 THOUSANDS/UL (ref 149–390)
PMV BLD AUTO: 10.4 FL (ref 8.9–12.7)
POTASSIUM SERPL-SCNC: 4.1 MMOL/L (ref 3.5–5.3)
PROT SERPL-MCNC: 6.8 G/DL (ref 6.4–8.4)
PROT UR STRIP-MCNC: ABNORMAL MG/DL
RBC # BLD AUTO: 5.23 MILLION/UL (ref 3.81–5.12)
SODIUM SERPL-SCNC: 138 MMOL/L (ref 135–147)
SP GR UR STRIP.AUTO: >=1.03 (ref 1–1.03)
UROBILINOGEN UR STRIP-ACNC: 2 MG/DL
WBC # BLD AUTO: 14.34 THOUSAND/UL (ref 4.31–10.16)

## 2024-10-09 PROCEDURE — 71045 X-RAY EXAM CHEST 1 VIEW: CPT

## 2024-10-09 PROCEDURE — 36415 COLL VENOUS BLD VENIPUNCTURE: CPT

## 2024-10-09 PROCEDURE — 81001 URINALYSIS AUTO W/SCOPE: CPT

## 2024-10-09 PROCEDURE — 93005 ELECTROCARDIOGRAM TRACING: CPT

## 2024-10-09 PROCEDURE — 96365 THER/PROPH/DIAG IV INF INIT: CPT

## 2024-10-09 PROCEDURE — 84484 ASSAY OF TROPONIN QUANT: CPT

## 2024-10-09 PROCEDURE — 83735 ASSAY OF MAGNESIUM: CPT

## 2024-10-09 PROCEDURE — 70450 CT HEAD/BRAIN W/O DYE: CPT

## 2024-10-09 PROCEDURE — 96375 TX/PRO/DX INJ NEW DRUG ADDON: CPT

## 2024-10-09 PROCEDURE — 99284 EMERGENCY DEPT VISIT MOD MDM: CPT

## 2024-10-09 PROCEDURE — 81025 URINE PREGNANCY TEST: CPT

## 2024-10-09 PROCEDURE — 85025 COMPLETE CBC W/AUTO DIFF WBC: CPT

## 2024-10-09 PROCEDURE — 80053 COMPREHEN METABOLIC PANEL: CPT

## 2024-10-09 PROCEDURE — 99285 EMERGENCY DEPT VISIT HI MDM: CPT

## 2024-10-09 RX ORDER — MAGNESIUM SULFATE HEPTAHYDRATE 40 MG/ML
2 INJECTION, SOLUTION INTRAVENOUS ONCE
Status: COMPLETED | OUTPATIENT
Start: 2024-10-09 | End: 2024-10-09

## 2024-10-09 RX ORDER — METOCLOPRAMIDE HYDROCHLORIDE 5 MG/ML
10 INJECTION INTRAMUSCULAR; INTRAVENOUS ONCE
Status: COMPLETED | OUTPATIENT
Start: 2024-10-09 | End: 2024-10-09

## 2024-10-09 RX ORDER — DIPHENHYDRAMINE HYDROCHLORIDE 50 MG/ML
25 INJECTION INTRAMUSCULAR; INTRAVENOUS ONCE
Status: COMPLETED | OUTPATIENT
Start: 2024-10-09 | End: 2024-10-09

## 2024-10-09 RX ORDER — KETOROLAC TROMETHAMINE 30 MG/ML
15 INJECTION, SOLUTION INTRAMUSCULAR; INTRAVENOUS ONCE
Status: COMPLETED | OUTPATIENT
Start: 2024-10-09 | End: 2024-10-09

## 2024-10-09 RX ADMIN — KETOROLAC TROMETHAMINE 15 MG: 30 INJECTION, SOLUTION INTRAMUSCULAR at 22:49

## 2024-10-09 RX ADMIN — METOCLOPRAMIDE 10 MG: 5 INJECTION, SOLUTION INTRAMUSCULAR; INTRAVENOUS at 23:16

## 2024-10-09 RX ADMIN — DIPHENHYDRAMINE HYDROCHLORIDE 25 MG: 50 INJECTION, SOLUTION INTRAMUSCULAR; INTRAVENOUS at 22:50

## 2024-10-09 RX ADMIN — MAGNESIUM SULFATE HEPTAHYDRATE 2 G: 40 INJECTION, SOLUTION INTRAVENOUS at 22:51

## 2024-10-09 NOTE — Clinical Note
Gita Rendon was seen and treated in our emergency department on 10/9/2024.                Diagnosis:     Gita  is off the rest of the shift today.    She may return on this date:          If you have any questions or concerns, please don't hesitate to call.      Chilo Aguilar MD    ______________________________           _______________          _______________  Hospital Representative                              Date                                Time

## 2024-10-10 VITALS
TEMPERATURE: 97.2 F | DIASTOLIC BLOOD PRESSURE: 54 MMHG | BODY MASS INDEX: 49.49 KG/M2 | RESPIRATION RATE: 15 BRPM | SYSTOLIC BLOOD PRESSURE: 109 MMHG | WEIGHT: 293 LBS | HEART RATE: 61 BPM | OXYGEN SATURATION: 99 %

## 2024-10-10 LAB
ATRIAL RATE: 59 BPM
ATRIAL RATE: 59 BPM
BACTERIA UR QL AUTO: ABNORMAL /HPF
CAOX CRY URNS QL MICRO: ABNORMAL /HPF
NON-SQ EPI CELLS URNS QL MICRO: ABNORMAL /HPF
P AXIS: 62 DEGREES
P AXIS: 63 DEGREES
PR INTERVAL: 184 MS
PR INTERVAL: 184 MS
QRS AXIS: 48 DEGREES
QRS AXIS: 49 DEGREES
QRSD INTERVAL: 88 MS
QRSD INTERVAL: 90 MS
QT INTERVAL: 390 MS
QT INTERVAL: 392 MS
QTC INTERVAL: 386 MS
QTC INTERVAL: 388 MS
RBC #/AREA URNS AUTO: ABNORMAL /HPF
T WAVE AXIS: 58 DEGREES
T WAVE AXIS: 58 DEGREES
VENTRICULAR RATE: 59 BPM
VENTRICULAR RATE: 59 BPM
WBC #/AREA URNS AUTO: ABNORMAL /HPF

## 2024-10-10 PROCEDURE — 93010 ELECTROCARDIOGRAM REPORT: CPT | Performed by: INTERNAL MEDICINE

## 2024-10-10 PROCEDURE — 96367 TX/PROPH/DG ADDL SEQ IV INF: CPT

## 2024-10-10 RX ORDER — SODIUM CHLORIDE, SODIUM GLUCONATE, SODIUM ACETATE, POTASSIUM CHLORIDE, MAGNESIUM CHLORIDE, SODIUM PHOSPHATE, DIBASIC, AND POTASSIUM PHOSPHATE .53; .5; .37; .037; .03; .012; .00082 G/100ML; G/100ML; G/100ML; G/100ML; G/100ML; G/100ML; G/100ML
1000 INJECTION, SOLUTION INTRAVENOUS ONCE
Status: COMPLETED | OUTPATIENT
Start: 2024-10-10 | End: 2024-10-10

## 2024-10-10 RX ADMIN — SODIUM CHLORIDE, SODIUM GLUCONATE, SODIUM ACETATE, POTASSIUM CHLORIDE, MAGNESIUM CHLORIDE, SODIUM PHOSPHATE, DIBASIC, AND POTASSIUM PHOSPHATE 1000 ML: .53; .5; .37; .037; .03; .012; .00082 INJECTION, SOLUTION INTRAVENOUS at 00:08

## 2024-10-10 NOTE — ED PROVIDER NOTES
Final diagnoses:   Headache   Fatigue   Atypical chest pain     ED Disposition       ED Disposition   Discharge    Condition   Stable    Date/Time   u Oct 10, 2024 12:54 AM    Comment   Gita Rendon discharge to home/self care.                   Assessment & Plan       Medical Decision Making  Medical complexity: 39-year-old female with headache that has lasted for over 6 days now, shortly after taking ketamine last week, she developed headache.  Has been refractory to her normal headache treatments at home including Nurtec, steroid, and increased dose of Diamox.  No other symptoms of illness that she is endorsing at this time.  Has been having other strange symptoms including urinary frequency yesterday and an episode of chest discomfort yesterday.  Given history of IIH, this is a possible etiology of her symptoms though her last opening pressure was noted to be in the teens and it seems as though the acetazolamide has been doing its job up until recently.  Patient also had history of migraines prior to the IIH.  She is denying any vision changes at this time or other new neurologic symptoms of serious concern.  No recent infectious symptoms, and her headache is rated as a 5 or 6 out of 10 in intensity. Unfortunately patient's body habitus would make a Left Lat Decub LP very technically challenging as I am having a difficult time visualizing or palpating any landmarks.      Reassessment/disposition: Thankfully, patient's symptoms improved significantly after treatment.  She states her headache now is currently 3 out of 10 in intensity.  She did have a slightly worse metabolic acidosis indicated by a decreased CO2 which is expected with a carbonic anhydrase inhibitor like acetazolamide at increased dose.  This may be contributing somewhat to her fatigue.  Additionally however, patient needs to follow-up with her nephrology and neurology team regarding her symptoms and ask if they think it would be beneficial  for her to change her dose or go back to her lower dose of acetazolamide.  At this time, patient's bicarb is greater than 15, no indication for admission, I did treat with isotonic saline solution (1 L bolus).  Patient will need recheck of labs as scheduled in 7 days.  Patient expressed understanding of her lab abnormality and the indications that could have on her acetazolamide dose.  She also expressed understanding that her CT of the head did not demonstrate any acute abnormalities.  At this time, she is denying any visual changes.  Though her symptoms may possibly be related to IIH, I do question the benefit of lumbar puncture especially given her normal range opening pressure on her last LP in March when she was more symptomatic than she was today.  I will have her follow-up with her neurology team she has ongoing headache, but she understands that if things were to severely worsen especially if she were to have any new neurologic symptoms like numbness or weakness or difficulty swallowing or difficulty with ambulation or severe vision changes that she needs to come back to the emergency department immediately.  Patient was discharged ambulating under own power, with no assistance, with normal range vital signs, improved symptoms, without vomiting or significant nausea.     Amount and/or Complexity of Data Reviewed  Labs: ordered. Decision-making details documented in ED Course.  Radiology: ordered and independent interpretation performed.    Risk  Prescription drug management.        ED Course as of 10/10/24 0128   Wed Oct 09, 2024   2240 ECG interpreted by me.  Date: 10/9/2024.  Time: 2231.  Rate: 59 bpm.  Axis: Normal.  Rhythm: Regular.  There are no ST elevations or depressions evident.  No repolarization abnormalities identified.  Interpretation: Sinus bradycardia but otherwise normal ECG.   2302 WBC(!): 14.34   2328 hs TnI 0hr: 3   2350 Carbon Dioxide(!): 16   2357 Patient reassessed at bedside.   "Continuing to deny any new neurologic symptoms.  Headache has improved now to a 4/10 in intensity (from a 6/10).  Patients concern right now is that \"I still feel so 'Blah'\".  I discussed with her the decreased BiCarb and possible slight metabolic acidosis and the need to followup with her Nephro /Neuro team in the morning regarding her labwork tonight and whether they recommend any med changes given her labs and symptoms.        Medications   ketorolac (TORADOL) injection 15 mg (15 mg Intravenous Given 10/9/24 2249)   metoclopramide (REGLAN) injection 10 mg (10 mg Intravenous Given 10/9/24 2316)   diphenhydrAMINE (BENADRYL) injection 25 mg (25 mg Intravenous Given 10/9/24 2250)   magnesium sulfate 2 g/50 mL IVPB (premix) 2 g (0 g Intravenous Stopped 10/9/24 2316)   multi-electrolyte (ISOLYTE-S PH 7.4) bolus 1,000 mL (0 mL Intravenous Stopped 10/10/24 0054)       ED Risk Strat Scores   HEART Risk Score      Flowsheet Row Most Recent Value   Heart Score Risk Calculator    History 0 Filed at: 10/09/2024 2341   ECG 0 Filed at: 10/09/2024 2341   Age 0 Filed at: 10/09/2024 2341   Risk Factors 2 Filed at: 10/09/2024 2341   Troponin 0 Filed at: 10/09/2024 2341   HEART Score 2 Filed at: 10/09/2024 2341                               SBIRT 22yo+      Flowsheet Row Most Recent Value   Initial Alcohol Screen: US AUDIT-C     3b. FEMALE Any Age, or MALE 65+: How often do you have 4 or more drinks on one occassion? 0 Filed at: 10/09/2024 2209   Audit-C Score 0 Filed at: 10/09/2024 2209   JENNIFER: How many times in the past year have you...    Used an illegal drug or used a prescription medication for non-medical reasons? Never Filed at: 10/09/2024 2209                            History of Present Illness       Chief Complaint   Patient presents with    Headache     Headache for 1 week       Past Medical History:   Diagnosis Date    Allergic     Allergic rhinitis     Anorexia nervosa in remission     Anxiety     Arthritis 5/8/2014    " Back pain     Bipolar disorder (HCC)     Depression     Dermatitis 10/16/2021    Disease of thyroid gland     Diverticulitis of colon 9/20/2015    Dizziness     Ear problems     Eating disorder     Esophageal reflux 08/15/2013    GERD (gastroesophageal reflux disease) 8/15/2013    Headache(784.0)     Headache, tension-type     Hypertension     Hypothyroid     Idiopathic intracranial hypertension     Lumbar degenerative disc disease 05/08/2014    Migraine     Nasal congestion     Nosebleed     Obesity     Obsessive-compulsive disorder     Otitis media     Overactive bladder     Psychiatric disorder     Psychiatric illness     Restless leg syndrome, controlled     Seasonal allergies     Sinusitis     Sleep apnea     Sleep difficulties     Suicide and self-inflicted injury (HCC)     Tinnitus     TMJ dysfunction     Tonsillitis     Transcranial Magnetic Stimulation 09/06/2021    Urinary tract infection     Vision loss       Past Surgical History:   Procedure Laterality Date    DENTAL SURGERY      wisdom teeth-at [age] years. Other surgery dental extraxtion of bone spur (10 years ago)    IR LUMBAR PUNCTURE  02/26/2019    SINUS ENDOSCOPY      SINUS SURGERY      TONSILLECTOMY        Family History   Problem Relation Age of Onset    Mental illness Mother     Depression Mother     Suicide Attempts Mother     Hypertension Mother     Diabetes Mother     Other Mother         bicuspid aortic valve    Anxiety disorder Mother     Psychiatric Illness Mother     Schizoaffective Disorder  Mother     Anemia Mother     Hypertension Father     Hypothyroidism Father     Thyroid disease Father     Hypertension Brother     Cancer Maternal Grandmother     Heart disease Maternal Grandmother     Stroke Maternal Grandmother     Breast cancer Maternal Grandmother     Skin cancer Maternal Grandmother     Arthritis Maternal Grandmother     Pneumonia Maternal Grandfather     Cancer Maternal Grandfather     Dementia Maternal Grandfather     COPD  "Maternal Grandfather     Cancer Paternal Grandmother     Pneumonia Paternal Grandfather     Arthritis Family     Breast cancer Family     Osteopenia Family     Osteoporosis Family     Hypertension Family     Transient ischemic attack Family       Social History     Tobacco Use    Smoking status: Never     Passive exposure: Never    Smokeless tobacco: Never   Vaping Use    Vaping status: Never Used   Substance Use Topics    Alcohol use: Never    Drug use: Never      E-Cigarette/Vaping    E-Cigarette Use Never User       E-Cigarette/Vaping Substances    Nicotine No     THC No     CBD No     Flavoring No     Other No     Unknown No       I have reviewed and agree with the history as documented.     This is a 39-year-old female who has a known history of of hypertension, hypothyroidism, OCD, depression, morbid obesity, idiopathic intracranial hypertension, chronic migraines, and prediabetes who is presenting today with concern for headache that has lasted for nearly 1 week.  Patient reports that her symptoms began last Thursday.  She did undergo ketamine treatment for her depression on Wednesday and stated that when she left she felt very \"strange\".  She believes this is around the time that her headache started.  Her headache is described as a bifrontal tension-like headache.  She denies any significant changes in her vision with this headache.  She reports significant weight loss over the last year or so which she attributes to Wegovy, and states that it has helped to control her IIH symptoms.  When she started with her headache, she did call her neurology team at [place] who increased her Diamox 2 days ago.  Patient states that sometime yesterday she began experiencing some chest heaviness and a brief episode of shortness of breath as well.  She denies any cough, fever, nightsweats, myalgias.  She is also denying any light sensitivity, noise sensitivity, nausea, vomiting, or new neurologic symptoms such as " weakness, numbness, tingling, difficulty with word finding, confusion.  She does however endorse significant fatigue over the past few days.        Review of Systems   Constitutional:  Positive for fatigue. Negative for chills and fever.   HENT:  Negative for ear pain and sore throat.    Eyes:  Negative for pain and visual disturbance.   Respiratory:  Negative for cough and shortness of breath.    Cardiovascular:  Positive for chest pain. Negative for palpitations.   Gastrointestinal:  Negative for abdominal pain and vomiting.   Genitourinary:  Negative for dysuria and hematuria.   Musculoskeletal:  Negative for arthralgias and back pain.   Skin:  Negative for color change and rash.   Neurological:  Positive for headaches. Negative for seizures and syncope.   All other systems reviewed and are negative.          Objective       ED Triage Vitals   Temperature Pulse Blood Pressure Respirations SpO2 Patient Position - Orthostatic VS   10/09/24 2206 10/09/24 2206 10/09/24 2206 10/09/24 2206 10/09/24 2206 10/09/24 2206   (!) 97.2 °F (36.2 °C) 72 (!) 156/101 20 98 % Sitting      Temp Source Heart Rate Source BP Location FiO2 (%) Pain Score    10/09/24 2206 10/09/24 2231 10/09/24 2206 -- 10/09/24 2206    Temporal Monitor Left arm  6      Vitals      Date and Time Temp Pulse SpO2 Resp BP Pain Score FACES Pain Rating User   10/10/24 0100 -- 61 99 % 15 109/54 -- -- CS   10/10/24 0045 -- 61 100 % 15 105/54 -- -- CS   10/09/24 2231 -- 60 100 % 22 165/97 -- -- CS   10/09/24 2206 97.2 °F (36.2 °C) 72 98 % 20 156/101 6 -- KTR            Physical Exam  Vitals and nursing note reviewed.   Constitutional:       General: She is not in acute distress.     Appearance: She is well-developed.   HENT:      Head: Normocephalic and atraumatic.      Right Ear: External ear normal.      Left Ear: External ear normal.      Nose: Nose normal. No congestion or rhinorrhea.      Mouth/Throat:      Mouth: Mucous membranes are moist.      Pharynx:  Oropharynx is clear. No oropharyngeal exudate or posterior oropharyngeal erythema.   Eyes:      General: No scleral icterus.     Extraocular Movements: Extraocular movements intact.      Conjunctiva/sclera: Conjunctivae normal.      Pupils: Pupils are equal, round, and reactive to light.   Cardiovascular:      Rate and Rhythm: Normal rate and regular rhythm.      Pulses: Normal pulses.      Heart sounds: Normal heart sounds. No murmur heard.  Pulmonary:      Effort: Pulmonary effort is normal. No respiratory distress.      Breath sounds: Normal breath sounds. No wheezing or rhonchi.   Abdominal:      General: Abdomen is flat. There is no distension.      Palpations: Abdomen is soft.      Tenderness: There is no abdominal tenderness. There is no guarding.   Musculoskeletal:         General: No swelling.      Cervical back: Neck supple. No rigidity.      Right lower leg: No edema.      Left lower leg: No edema.   Lymphadenopathy:      Cervical: No cervical adenopathy.   Skin:     General: Skin is warm and dry.      Capillary Refill: Capillary refill takes less than 2 seconds.      Coloration: Skin is not jaundiced.      Findings: No rash.   Neurological:      General: No focal deficit present.      Mental Status: She is alert and oriented to person, place, and time. Mental status is at baseline.      GCS: GCS eye subscore is 4. GCS verbal subscore is 5. GCS motor subscore is 6.      Cranial Nerves: Cranial nerves 2-12 are intact. No cranial nerve deficit or dysarthria.      Sensory: No sensory deficit.      Motor: No weakness, tremor or abnormal muscle tone.      Coordination: Finger-Nose-Finger Test and Heel to Shin Test normal.      Gait: Gait normal.      Deep Tendon Reflexes: Reflexes normal.   Psychiatric:         Mood and Affect: Mood normal.         Behavior: Behavior normal.         Results Reviewed       Procedure Component Value Units Date/Time    Urine Microscopic [[id number]]  (Abnormal) Collected: 10/09/24  2328    Lab Status: Final result Specimen: Urine, Clean Catch Updated: 10/10/24 0037     RBC, UA 1-2 /hpf      WBC, UA 1-2 /hpf      Epithelial Cells Occasional /hpf      Bacteria, UA Occasional /hpf      Ca Oxalate Alida, UA Occasional /hpf     UA w Reflex to Microscopic w Reflex to Culture [[id number]]  (Abnormal) Collected: 10/09/24 2328    Lab Status: Final result Specimen: Urine, Clean Catch Updated: 10/09/24 2351     Color, UA Yellow     Clarity, UA Clear     Specific Gravity, UA >=1.030     pH, UA 5.5     Leukocytes, UA Negative     Nitrite, UA Negative     Protein, UA Trace mg/dl      Glucose, UA Negative mg/dl      Ketones, UA Negative mg/dl      Urobilinogen, UA 2.0 mg/dl      Bilirubin, UA Negative     Occult Blood, UA Negative    POCT pregnancy, urine [[id number]]  (Normal) Resulted: 10/09/24 2328    Lab Status: Final result Updated: 10/09/24 2329     EXT Preg Test, Ur Negative     Control Valid    Comprehensive metabolic panel [[id number]]  (Abnormal) Collected: 10/09/24 2247    Lab Status: Final result Specimen: Blood from Line, Venous Updated: 10/09/24 2325     Sodium 138 mmol/L      Potassium 4.1 mmol/L      Chloride 111 mmol/L      CO2 16 mmol/L      ANION GAP 11 mmol/L      BUN 26 mg/dL      Creatinine 0.73 mg/dL      Glucose 96 mg/dL      Calcium 9.0 mg/dL      AST 24 U/L      ALT 26 U/L      Alkaline Phosphatase 86 U/L      Total Protein 6.8 g/dL      Albumin 4.1 g/dL      Total Bilirubin 0.32 mg/dL      eGFR 103 ml/min/1.73sq m     Narrative:      National Kid[employer] Foundation guidelines for Chronic Kidney Disease (CKD):     Stage 1 with normal or high GFR (GFR > 90 mL/min/1.73 square meters)    Stage 2 Mild CKD (GFR = 60-89 mL/min/1.73 square meters)    Stage 3A Moderate CKD (GFR = 45-59 mL/min/1.73 square meters)    Stage 3B Moderate CKD (GFR = 30-44 mL/min/1.73 square meters)    Stage 4 Severe CKD (GFR = 15-29 mL/min/1.73 square meters)    Stage 5 End Stage CKD (GFR <15 mL/min/1.73 square  meters)  Note: GFR calculation is accurate only with a steady state creatinine    Magnesium [[id number]]  (Normal) Collected: 10/09/24 2247    Lab Status: Final result Specimen: Blood from Line, Venous Updated: 10/09/24 2325     Magnesium 1.9 mg/dL     HS Troponin 0hr (reflex protocol) [[id number]]  (Normal) Collected: 10/09/24 2247    Lab Status: Final result Specimen: Blood from Line, Venous Updated: 10/09/24 2325     hs TnI 0hr 3 ng/L     CBC and differential [[id number]]  (Abnormal) Collected: 10/09/24 2247    Lab Status: Final result Specimen: Blood from Line, Venous Updated: 10/09/24 2258     WBC 14.34 Thousand/uL      RBC 5.23 Million/uL      Hemoglobin 14.6 g/dL      Hematocrit 46.8 %      MCV 90 fL      MCH 27.9 pg      MCHC 31.2 g/dL      RDW 13.4 %      MPV 10.4 fL      Platelets 258 Thousands/uL      nRBC 0 /100 WBCs      Segmented % 63 %      Immature Grans % 0 %      Lymphocytes % 24 %      Monocytes % 6 %      Eosinophils Relative 6 %      Basophils Relative 1 %      Absolute Neutrophils 9.03 Thousands/µL      Absolute Immature Grans 0.06 Thousand/uL      Absolute Lymphocytes 3.43 Thousands/µL      Absolute Monocytes 0.90 Thousand/µL      Eosinophils Absolute 0.85 Thousand/µL      Basophils Absolute 0.07 Thousands/µL             CT head wo contrast   Final Interpretation by Mike Barnett MD (10/09 2323)      No acute intracranial abnormality.                  Workstation performed: [id number]         XR chest portable   ED Interpretation by Chilo Aguilar MD (10/09 2245)   No acute intrathoracic pathology identified on my wet read.          Procedures    ED Medication and Procedure Management   Prior to Admission Medications   Prescriptions Last Dose Informant Patient Reported? Taking?   Ajovy 225 MG/1.5ML auto-injector  Self Yes No   Sig: Inject 225 mg under the skin every 30 (thirty) days   Cequa 0.09 % SOLN  Self No No   Sig: Apply 1 drop to eye 2 (two) times a day   DULoxetine (CYMBALTA) 60  mg delayed release capsule  Self No No   Sig: Take 1 capsule (60 mg total) by mouth daily   Mirabegron ER 50 MG TB24   No No   Sig: Take 1 tablet (50 mg total) by mouth in the morning   Multiple Vitamin (MULTIVITAMIN) capsule  Self Yes No   Sig: Take 1 capsule by mouth daily   Nurtec 75 MG TBDP  Self Yes No   Sig: Take 75 mg by mouth   Semaglutide-Weight Management (WEGOVY) 1.7 MG/0.75ML  Self No No   Sig: Inject 0.75 mL (1.7 mg total) under the skin once a week   acetaZOLAMIDE (DIAMOX) 250 mg tablet   No No   Sig: take 3 tablets by mouth twice a day   celecoxib (CeleBREX) 200 mg capsule  Self No No   Sig: Take 1 capsule (200 mg total) by mouth 2 (two) times a day   cetirizine (ZyrTEC) 10 MG chewable tablet  Self Yes No   Sig: Chew 10 mg daily   cholecalciferol 1,000 units tablet  Self No No   Sig: Take 5 tablets (5,000 Units total) by mouth daily   famotidine (PEPCID) 20 mg tablet  Self No No   Sig: Take 1 tablet (20 mg total) by mouth 2 (two) times a day   fluvoxaMINE (LUVOX) 50 mg tablet  Self Yes No   Sig: Take 50 mg by mouth daily   gabapentin (NEURONTIN) 300 mg capsule  Self No No   Sig: Take 1 capsule (300 mg total) by mouth daily after breakfast   gabapentin (NEURONTIN) 600 MG tablet  Self No No   Sig: Take 1.5 tablets (900 mg total) by mouth daily at bedtime   hydrocortisone (ANUSOL-HC) 2.5 % rectal cream  Self No No   Sig: Apply topically 2 (two) times a day   ipratropium (ATROVENT) 0.03 % nasal spray  Self No No   Si sprays into each nostril every 12 (twelve) hours   levothyroxine 112 mcg tablet  Self No No   Sig: Take 1 tablet (112 mcg total) by mouth daily in the early morning   linaCLOtide (Linzess) 290 MCG CAPS  Self No No   Sig: Take 1 capsule by mouth daily   metFORMIN (GLUCOPHAGE) 500 mg tablet  Self No No   Sig: Take 1 tablet (500 mg total) by mouth daily with breakfast   omeprazole (PriLOSEC) 20 mg delayed release capsule  Self No No   Sig: Take 1 capsule (20 mg total) by mouth 2 (two) times  a day   ondansetron (ZOFRAN) 4 mg tablet  Self No No   Sig: Take 1 tablet (4 mg total) by mouth every 6 (six) hours   prochlorperazine (COMPAZINE) 10 mg tablet  Self Yes No   Sig: Take 10 mg by mouth every 6 (six) hours as needed for nausea or vomiting Take 1 tablet by TID if needed for headache or nausea. Limit to 2x a week .   spironolactone (ALDACTONE) 25 mg tablet  Self No No   Sig: Take 1 tablet (25 mg total) by mouth daily   sucralfate (CARAFATE) 1 g tablet  Self No No   Sig: Take 1 tablet (1 g total) by mouth 3 (three) times a day for 10 days   topiramate (TOPAMAX) 100 mg tablet  Self Yes No   Sig: TAKE 1.5 TABLETS BID      Facility-Administered Medications: None     Discharge Medication List as of 10/10/2024 12:56 AM        CONTINUE these medications which have NOT CHANGED    Details   acetaZOLAMIDE (DIAMOX) 250 mg tablet take 3 tablets by mouth twice a day, Starting Tue 10/8/2024, Normal      Ajovy 225 MG/1.5ML auto-injector Inject 225 mg under the skin every 30 (thirty) days, Starting Wed 9/11/2024, Historical Med      celecoxib (CeleBREX) 200 mg capsule Take 1 capsule (200 mg total) by mouth 2 (two) times a day, Starting Tue 4/16/2024, Normal      Cequa 0.09 % SOLN Apply 1 drop to eye 2 (two) times a day, Starting Sat 8/5/2023, Normal      cetirizine (ZyrTEC) 10 MG chewable tablet Chew 10 mg daily, Historical Med      cholecalciferol 1,000 units tablet Take 5 tablets (5,000 Units total) by mouth daily, Starting Tue 4/16/2024, Normal      DULoxetine (CYMBALTA) 60 mg delayed release capsule Take 1 capsule (60 mg total) by mouth daily, Starting Tue 7/16/2024, Normal      famotidine (PEPCID) 20 mg tablet Take 1 tablet (20 mg total) by mouth 2 (two) times a day, Starting Tue 9/17/2024, Normal      fluvoxaMINE (LUVOX) 50 mg tablet Take 50 mg by mouth daily, Starting Mon 9/23/2024, Historical Med      gabapentin (NEURONTIN) 300 mg capsule Take 1 capsule (300 mg total) by mouth daily after breakfast, Starting Fri  7/12/2024, No Print      gabapentin (NEURONTIN) 600 MG tablet Take 1.5 tablets (900 mg total) by mouth daily at bedtime, Starting Tue 4/16/2024, Normal      hydrocortisone (ANUSOL-HC) 2.5 % rectal cream Apply topically 2 (two) times a day, Starting Wed 6/26/2024, Normal      ipratropium (ATROVENT) 0.03 % nasal spray 2 sprays into each nostril every 12 (twelve) hours, Starting Mon 7/22/2024, Normal      levothyroxine 112 mcg tablet Take 1 tablet (112 mcg total) by mouth daily in the early morning, Starting Mon 4/29/2024, Normal      linaCLOtide (Linzess) 290 MCG CAPS Take 1 capsule by mouth daily, Starting Sun 8/18/2024, Until Fri 2/14/2025, Normal      metFORMIN (GLUCOPHAGE) 500 mg tablet Take 1 tablet (500 mg total) by mouth daily with breakfast, Starting Wed 6/26/2024, Normal      Mirabegron ER 50 MG TB24 Take 1 tablet (50 mg total) by mouth in the morning, Starting Sun 9/29/2024, Normal      Multiple Vitamin (MULTIVITAMIN) capsule Take 1 capsule by mouth daily, Historical Med      Nurtec 75 MG TBDP Take 75 mg by mouth, Starting Tue 9/3/2024, Historical Med      omeprazole (PriLOSEC) 20 mg delayed release capsule Take 1 capsule (20 mg total) by mouth 2 (two) times a day, Starting Mon 6/17/2024, Normal      ondansetron (ZOFRAN) 4 mg tablet Take 1 tablet (4 mg total) by mouth every 6 (six) hours, Starting Tue 9/17/2024, Normal      prochlorperazine (COMPAZINE) 10 mg tablet Take 10 mg by mouth every 6 (six) hours as needed for nausea or vomiting Take 1 tablet by TID if needed for headache or nausea. Limit to 2x a week ., Historical Med      Semaglutide-Weight Management (WEGOVY) 1.7 MG/0.75ML Inject 0.75 mL (1.7 mg total) under the skin once a week, Starting Tue 9/24/2024, Normal      spironolactone (ALDACTONE) 25 mg tablet Take 1 tablet (25 mg total) by mouth daily, Starting Mon 9/23/2024, Normal      sucralfate (CARAFATE) 1 g tablet Take 1 tablet (1 g total) by mouth 3 (three) times a day for 10 days, Starting Tue  9/17/2024, Until Fri 9/27/2024, Normal      topiramate (TOPAMAX) 100 mg tablet TAKE 1.5 TABLETS BID, Starting Sat 6/1/2024, Historical Med           No discharge procedures on file.  ED SEPSIS DOCUMENTATION   Time reflects when diagnosis was documented in both MDM as applicable and the Disposition within this note       Time User Action Codes Description Comment    10/10/2024 12:54 AM Chilo Aguilar [R51.9] Headache     10/10/2024 12:54 AM Chilo Aguilar [R53.83] Fatigue     10/10/2024  1:28 AM Chilo Aguilar [R07.89] Atypical chest pain                  Chilo Aguilar MD  10/10/24 [id number]

## 2024-10-10 NOTE — DISCHARGE INSTRUCTIONS
Call nephrology to discuss your Diamox dose given the lower CO2 than normal and your ongoing symptoms.  You should also call your neurology team to discuss your ongoing headaches.  They may want to see you and may even need to discuss with you possibility of another lumbar puncture.  If you are having new severe symptoms especially if you are having numbness or weakness in one-sided body or if you are unable to speak or walk normally, come back to the emergency department.

## 2024-10-11 ENCOUNTER — OFFICE VISIT (OUTPATIENT)
Dept: FAMILY MEDICINE CLINIC | Facility: CLINIC | Age: 39
End: 2024-10-11
Payer: COMMERCIAL

## 2024-10-11 VITALS
TEMPERATURE: 96.9 F | HEART RATE: 69 BPM | DIASTOLIC BLOOD PRESSURE: 82 MMHG | HEIGHT: 67 IN | SYSTOLIC BLOOD PRESSURE: 114 MMHG | WEIGHT: 293 LBS | OXYGEN SATURATION: 99 % | BODY MASS INDEX: 45.99 KG/M2

## 2024-10-11 DIAGNOSIS — G43.819 OTHER MIGRAINE WITHOUT STATUS MIGRAINOSUS, INTRACTABLE: Primary | ICD-10-CM

## 2024-10-11 DIAGNOSIS — R79.81 BLOOD CO2 DECREASED: ICD-10-CM

## 2024-10-11 PROCEDURE — 99213 OFFICE O/P EST LOW 20 MIN: CPT | Performed by: FAMILY MEDICINE

## 2024-10-11 PROCEDURE — G2211 COMPLEX E/M VISIT ADD ON: HCPCS | Performed by: FAMILY MEDICINE

## 2024-10-11 NOTE — PROGRESS NOTES
Ambulatory Visit  Name: Gita Rendon      : 1985      MRN: [id number]  Encounter Provider: Norma Traore MD  Encounter Date: 10/11/2024   Encounter department: [facility]    Assessment & Plan  Other migraine without status migrainosus, intractable  -Patient continues to experience intractable migraines responsive only to migraine cocktail in the ED on 10/9/2024  -At this time, given refractory nature of migraine headaches PCP has counseled that patient should follow-up with [facility] neurology for any further recommendations.       Blood CO2 decreased  -Patient to recheck CO2 with BMP level on Tuesday standing order placed already by nephrology  -Since ED visit on 10/9/2024, patient has reduced dose back to 500 mg twice daily instead of the 750 mg twice daily          History of Present Illness     Neurologic Problem  The patient's primary symptoms include a loss of balance and a visual change. The patient's pertinent negatives include no altered mental status, clumsiness, focal sensory loss, focal weakness, memory loss, near-syncope, slurred speech, syncope or weakness. This is a chronic problem. The current episode started more than 1 year ago. The neurological problem developed gradually. The problem has been gradually worsening since onset. Associated symptoms include bladder incontinence, dizziness, fatigue, headaches, light-headedness, nausea, neck pain and shortness of breath. Pertinent negatives include no abdominal pain, auditory change, aura, back pain, bowel incontinence, chest pain, confusion, diaphoresis, fever, palpitations, vertigo or vomiting. Past treatments include medication and sleep. The treatment provided mild relief.     Patient seen in ED on 10/9/24 due to intractable migraine, patient reports she experienced transient relief with migraine cocktail.  However, unfortunately has continued to experience same migraine since then.  Regarding  "timeline, patient reports that Sunday migraine had slightly improved and thus she decided to take increased dose of acetazolamide which had been discussed with nephrology and improved.  Unfortunately, patient reports that Monday morning she woke up and had the same headache and by Wednesday to the ED.  Patient also reporting that from last Thursday into Friday she had the kind of headache where she was waking up and urinating multiple times throughout the day and night.  She reports she lost about 5 to 6 pounds during that timeframe but has regained that weight back.  Patient also shares with PCP that last week was the first time she tried the sublingual ketamine which could have possibly contributed to worsening of headache.  Patient reports that during those days she had deep sleep and would fall asleep immediately after her urinations despite increased frequencies.  Patient does report intermittent of blurred vision.  However, she states that she did have recent evaluation with her ophthalmologist and was told that everything was stable.        History obtained from : patient  Review of Systems   Constitutional:  Positive for fatigue. Negative for diaphoresis and fever.   Respiratory:  Positive for shortness of breath.    Cardiovascular:  Negative for chest pain, palpitations and near-syncope.   Gastrointestinal:  Positive for nausea. Negative for abdominal pain, bowel incontinence and vomiting.   Genitourinary:  Positive for bladder incontinence.   Musculoskeletal:  Positive for neck pain. Negative for back pain.   Neurological:  Positive for dizziness, light-headedness, headaches and loss of balance. Negative for vertigo, focal weakness, syncope and weakness.   Psychiatric/Behavioral:  Negative for confusion and memory loss.      Pertinent Medical History           Objective     /82 (BP Location: Left arm)   Pulse 69   Temp (!) 96.9 °F (36.1 °C) (Tympanic)   Ht 5' 7\" (1.702 m)   Wt (!) 147 kg (323 lb " 3.2 oz)   LMP 09/15/2024 (Exact Date)   SpO2 99%   BMI 50.62 kg/m²     Physical Exam  Constitutional:       Appearance: Normal appearance.   HENT:      Head: Normocephalic and atraumatic.   Eyes:      Comments: PCP completed funduscopic exam  Noted that when lights of the office were turned off pupillary reflex was slightly delayed  Normal pupillary reflex when lights in the office were on     Cardiovascular:      Rate and Rhythm: Normal rate and regular rhythm.      Heart sounds: Normal heart sounds. No murmur heard.  Neurological:      Mental Status: She is alert.      Comments: H test normal

## 2024-10-11 NOTE — ASSESSMENT & PLAN NOTE
-Patient continues to experience intractable migraines responsive only to migraine cocktail in the ED on 10/9/2024  -At this time, given refractory nature of migraine headaches PCP has counseled that patient should follow-up with [facility] neurology for any further recommendations.

## 2024-10-15 ENCOUNTER — APPOINTMENT (OUTPATIENT)
Dept: LAB | Facility: CLINIC | Age: 39
End: 2024-10-15
Payer: COMMERCIAL

## 2024-10-15 DIAGNOSIS — I10 PRIMARY HYPERTENSION: Chronic | ICD-10-CM

## 2024-10-15 LAB
ANION GAP SERPL CALCULATED.3IONS-SCNC: 8 MMOL/L (ref 4–13)
BUN SERPL-MCNC: 20 MG/DL (ref 5–25)
CALCIUM SERPL-MCNC: 8.6 MG/DL (ref 8.4–10.2)
CHLORIDE SERPL-SCNC: 112 MMOL/L (ref 96–108)
CO2 SERPL-SCNC: 22 MMOL/L (ref 21–32)
CREAT SERPL-MCNC: 0.68 MG/DL (ref 0.6–1.3)
GFR SERPL CREATININE-BSD FRML MDRD: 109 ML/MIN/1.73SQ M
GLUCOSE P FAST SERPL-MCNC: 96 MG/DL (ref 65–99)
POTASSIUM SERPL-SCNC: 3.8 MMOL/L (ref 3.5–5.3)
SODIUM SERPL-SCNC: 142 MMOL/L (ref 135–147)

## 2024-10-15 PROCEDURE — 80048 BASIC METABOLIC PNL TOTAL CA: CPT

## 2024-10-15 PROCEDURE — 36415 COLL VENOUS BLD VENIPUNCTURE: CPT

## 2024-10-16 ENCOUNTER — TELEPHONE (OUTPATIENT)
Age: 39
End: 2024-10-16

## 2024-10-16 NOTE — TELEPHONE ENCOUNTER
I called and left a VM for Elizabeth to call the office back to discuss the following:    ----- Message from Lianna Thompson DO sent at 10/15/2024  9:42 PM EDT -----  Please ask patient is he is taking her diamox, I know she was recently in ER. What dose is she taking of the diamox?

## 2024-10-17 ENCOUNTER — TELEPHONE (OUTPATIENT)
Age: 39
End: 2024-10-17

## 2024-10-21 NOTE — TELEPHONE ENCOUNTER
Sent response to patient via My Chart. Pt to contact [facility] per Dr. Schuster.    Thank you

## 2024-10-23 ENCOUNTER — HOSPITAL ENCOUNTER (EMERGENCY)
Facility: HOSPITAL | Age: 39
End: 2024-10-24
Attending: EMERGENCY MEDICINE
Payer: COMMERCIAL

## 2024-10-23 DIAGNOSIS — Z00.00 EVALUATION BY MEDICAL SERVICE REQUIRED: ICD-10-CM

## 2024-10-23 DIAGNOSIS — R45.851 DEPRESSION WITH SUICIDAL IDEATION: Primary | ICD-10-CM

## 2024-10-23 DIAGNOSIS — F32.A DEPRESSION WITH SUICIDAL IDEATION: Primary | ICD-10-CM

## 2024-10-23 LAB
AMPHETAMINES SERPL QL SCN: NEGATIVE
ANION GAP SERPL CALCULATED.3IONS-SCNC: 7 MMOL/L (ref 4–13)
BARBITURATES UR QL: NEGATIVE
BASOPHILS # BLD AUTO: 0.07 THOUSANDS/ΜL (ref 0–0.1)
BASOPHILS NFR BLD AUTO: 1 % (ref 0–1)
BENZODIAZ UR QL: NEGATIVE
BUN SERPL-MCNC: 28 MG/DL (ref 5–25)
CALCIUM SERPL-MCNC: 9 MG/DL (ref 8.4–10.2)
CHLORIDE SERPL-SCNC: 112 MMOL/L (ref 96–108)
CO2 SERPL-SCNC: 21 MMOL/L (ref 21–32)
COCAINE UR QL: NEGATIVE
CREAT SERPL-MCNC: 0.75 MG/DL (ref 0.6–1.3)
EOSINOPHIL # BLD AUTO: 0.62 THOUSAND/ΜL (ref 0–0.61)
EOSINOPHIL NFR BLD AUTO: 5 % (ref 0–6)
ERYTHROCYTE [DISTWIDTH] IN BLOOD BY AUTOMATED COUNT: 14 % (ref 11.6–15.1)
ETHANOL SERPL-MCNC: <10 MG/DL
FENTANYL UR QL SCN: NEGATIVE
GFR SERPL CREATININE-BSD FRML MDRD: 100 ML/MIN/1.73SQ M
GLUCOSE SERPL-MCNC: 90 MG/DL (ref 65–140)
HCG SERPL QL: NEGATIVE
HCT VFR BLD AUTO: 42.5 % (ref 34.8–46.1)
HGB BLD-MCNC: 13.5 G/DL (ref 11.5–15.4)
HYDROCODONE UR QL SCN: NEGATIVE
IMM GRANULOCYTES # BLD AUTO: 0.04 THOUSAND/UL (ref 0–0.2)
IMM GRANULOCYTES NFR BLD AUTO: 0 % (ref 0–2)
LYMPHOCYTES # BLD AUTO: 2.92 THOUSANDS/ΜL (ref 0.6–4.47)
LYMPHOCYTES NFR BLD AUTO: 25 % (ref 14–44)
MAGNESIUM SERPL-MCNC: 1.9 MG/DL (ref 1.9–2.7)
MCH RBC QN AUTO: 28.1 PG (ref 26.8–34.3)
MCHC RBC AUTO-ENTMCNC: 31.8 G/DL (ref 31.4–37.4)
MCV RBC AUTO: 89 FL (ref 82–98)
METHADONE UR QL: NEGATIVE
MONOCYTES # BLD AUTO: 0.82 THOUSAND/ΜL (ref 0.17–1.22)
MONOCYTES NFR BLD AUTO: 7 % (ref 4–12)
NEUTROPHILS # BLD AUTO: 7.44 THOUSANDS/ΜL (ref 1.85–7.62)
NEUTS SEG NFR BLD AUTO: 62 % (ref 43–75)
NRBC BLD AUTO-RTO: 0 /100 WBCS
OPIATES UR QL SCN: NEGATIVE
OXYCODONE+OXYMORPHONE UR QL SCN: NEGATIVE
PCP UR QL: NEGATIVE
PLATELET # BLD AUTO: 324 THOUSANDS/UL (ref 149–390)
PMV BLD AUTO: 9.2 FL (ref 8.9–12.7)
POTASSIUM SERPL-SCNC: 3.7 MMOL/L (ref 3.5–5.3)
RBC # BLD AUTO: 4.8 MILLION/UL (ref 3.81–5.12)
SODIUM SERPL-SCNC: 140 MMOL/L (ref 135–147)
THC UR QL: NEGATIVE
WBC # BLD AUTO: 11.91 THOUSAND/UL (ref 4.31–10.16)

## 2024-10-23 PROCEDURE — 83735 ASSAY OF MAGNESIUM: CPT | Performed by: EMERGENCY MEDICINE

## 2024-10-23 PROCEDURE — 99285 EMERGENCY DEPT VISIT HI MDM: CPT | Performed by: EMERGENCY MEDICINE

## 2024-10-23 PROCEDURE — 80048 BASIC METABOLIC PNL TOTAL CA: CPT | Performed by: EMERGENCY MEDICINE

## 2024-10-23 PROCEDURE — 80307 DRUG TEST PRSMV CHEM ANLYZR: CPT | Performed by: EMERGENCY MEDICINE

## 2024-10-23 PROCEDURE — 84703 CHORIONIC GONADOTROPIN ASSAY: CPT | Performed by: EMERGENCY MEDICINE

## 2024-10-23 PROCEDURE — 85025 COMPLETE CBC W/AUTO DIFF WBC: CPT | Performed by: EMERGENCY MEDICINE

## 2024-10-23 PROCEDURE — 96374 THER/PROPH/DIAG INJ IV PUSH: CPT

## 2024-10-23 PROCEDURE — 99284 EMERGENCY DEPT VISIT MOD MDM: CPT

## 2024-10-23 PROCEDURE — 82077 ASSAY SPEC XCP UR&BREATH IA: CPT | Performed by: EMERGENCY MEDICINE

## 2024-10-23 PROCEDURE — 36415 COLL VENOUS BLD VENIPUNCTURE: CPT | Performed by: EMERGENCY MEDICINE

## 2024-10-23 PROCEDURE — 96375 TX/PRO/DX INJ NEW DRUG ADDON: CPT

## 2024-10-23 RX ORDER — LORATADINE 10 MG/1
10 TABLET ORAL DAILY
Status: DISCONTINUED | OUTPATIENT
Start: 2024-10-24 | End: 2024-10-24 | Stop reason: HOSPADM

## 2024-10-23 RX ORDER — MIRABEGRON 50 MG/1
50 TABLET, FILM COATED, EXTENDED RELEASE ORAL DAILY
Status: DISCONTINUED | OUTPATIENT
Start: 2024-10-24 | End: 2024-10-24 | Stop reason: HOSPADM

## 2024-10-23 RX ORDER — ONDANSETRON 4 MG/1
4 TABLET, ORALLY DISINTEGRATING ORAL EVERY 8 HOURS PRN
Status: DISCONTINUED | OUTPATIENT
Start: 2024-10-23 | End: 2024-10-24 | Stop reason: HOSPADM

## 2024-10-23 RX ORDER — LUBIPROSTONE 8 UG/1
8 CAPSULE ORAL 2 TIMES DAILY
Status: DISCONTINUED | OUTPATIENT
Start: 2024-10-23 | End: 2024-10-24 | Stop reason: HOSPADM

## 2024-10-23 RX ORDER — DULOXETIN HYDROCHLORIDE 30 MG/1
60 CAPSULE, DELAYED RELEASE ORAL DAILY
Status: DISCONTINUED | OUTPATIENT
Start: 2024-10-24 | End: 2024-10-24 | Stop reason: HOSPADM

## 2024-10-23 RX ORDER — ACETAZOLAMIDE 250 MG/1
500 TABLET ORAL 2 TIMES DAILY
Status: DISCONTINUED | OUTPATIENT
Start: 2024-10-24 | End: 2024-10-24 | Stop reason: HOSPADM

## 2024-10-23 RX ORDER — GABAPENTIN 300 MG/1
900 CAPSULE ORAL
Status: DISCONTINUED | OUTPATIENT
Start: 2024-10-23 | End: 2024-10-24 | Stop reason: HOSPADM

## 2024-10-23 RX ORDER — PANTOPRAZOLE SODIUM 20 MG/1
20 TABLET, DELAYED RELEASE ORAL
Status: DISCONTINUED | OUTPATIENT
Start: 2024-10-24 | End: 2024-10-24 | Stop reason: HOSPADM

## 2024-10-23 RX ORDER — FLUVOXAMINE MALEATE 50 MG
50 TABLET ORAL DAILY
Status: DISCONTINUED | OUTPATIENT
Start: 2024-10-24 | End: 2024-10-24 | Stop reason: HOSPADM

## 2024-10-23 RX ORDER — KETOROLAC TROMETHAMINE 30 MG/ML
10 INJECTION, SOLUTION INTRAMUSCULAR; INTRAVENOUS ONCE
Status: COMPLETED | OUTPATIENT
Start: 2024-10-23 | End: 2024-10-23

## 2024-10-23 RX ORDER — LEVOTHYROXINE SODIUM 112 UG/1
112 TABLET ORAL
Status: DISCONTINUED | OUTPATIENT
Start: 2024-10-24 | End: 2024-10-24 | Stop reason: HOSPADM

## 2024-10-23 RX ORDER — FAMOTIDINE 20 MG/1
20 TABLET, FILM COATED ORAL 2 TIMES DAILY
Status: DISCONTINUED | OUTPATIENT
Start: 2024-10-24 | End: 2024-10-24 | Stop reason: HOSPADM

## 2024-10-23 RX ORDER — DEXAMETHASONE SODIUM PHOSPHATE 10 MG/ML
10 INJECTION, SOLUTION INTRAMUSCULAR; INTRAVENOUS ONCE
Status: COMPLETED | OUTPATIENT
Start: 2024-10-23 | End: 2024-10-23

## 2024-10-23 RX ORDER — METOCLOPRAMIDE HYDROCHLORIDE 5 MG/ML
10 INJECTION INTRAMUSCULAR; INTRAVENOUS ONCE
Status: COMPLETED | OUTPATIENT
Start: 2024-10-23 | End: 2024-10-23

## 2024-10-23 RX ORDER — IPRATROPIUM BROMIDE 42 UG/1
2 SPRAY, METERED NASAL EVERY 12 HOURS
Status: DISCONTINUED | OUTPATIENT
Start: 2024-10-23 | End: 2024-10-24 | Stop reason: HOSPADM

## 2024-10-23 RX ORDER — PROCHLORPERAZINE MALEATE 5 MG/1
5 TABLET ORAL EVERY 6 HOURS PRN
Status: DISCONTINUED | OUTPATIENT
Start: 2024-10-23 | End: 2024-10-24 | Stop reason: HOSPADM

## 2024-10-23 RX ORDER — DIPHENHYDRAMINE HYDROCHLORIDE 50 MG/ML
25 INJECTION INTRAMUSCULAR; INTRAVENOUS ONCE
Status: COMPLETED | OUTPATIENT
Start: 2024-10-23 | End: 2024-10-23

## 2024-10-23 RX ORDER — GABAPENTIN 300 MG/1
300 CAPSULE ORAL
Status: DISCONTINUED | OUTPATIENT
Start: 2024-10-24 | End: 2024-10-24 | Stop reason: HOSPADM

## 2024-10-23 RX ORDER — CELECOXIB 100 MG/1
200 CAPSULE ORAL 2 TIMES DAILY
Status: DISCONTINUED | OUTPATIENT
Start: 2024-10-24 | End: 2024-10-24 | Stop reason: HOSPADM

## 2024-10-23 RX ORDER — SPIRONOLACTONE 25 MG/1
25 TABLET ORAL DAILY
Status: DISCONTINUED | OUTPATIENT
Start: 2024-10-24 | End: 2024-10-24 | Stop reason: HOSPADM

## 2024-10-23 RX ADMIN — ONDANSETRON 4 MG: 4 TABLET, ORALLY DISINTEGRATING ORAL at 23:51

## 2024-10-23 RX ADMIN — DEXAMETHASONE SODIUM PHOSPHATE 10 MG: 10 INJECTION, SOLUTION INTRAMUSCULAR; INTRAVENOUS at 19:11

## 2024-10-23 RX ADMIN — KETOROLAC TROMETHAMINE 9.9 MG: 30 INJECTION, SOLUTION INTRAMUSCULAR at 19:08

## 2024-10-23 RX ADMIN — GABAPENTIN 900 MG: 300 CAPSULE ORAL at 23:51

## 2024-10-23 RX ADMIN — DIPHENHYDRAMINE HYDROCHLORIDE 25 MG: 50 INJECTION, SOLUTION INTRAMUSCULAR; INTRAVENOUS at 19:04

## 2024-10-23 RX ADMIN — METOCLOPRAMIDE 10 MG: 5 INJECTION, SOLUTION INTRAMUSCULAR; INTRAVENOUS at 19:09

## 2024-10-23 NOTE — ED PROVIDER NOTES
Time reflects when diagnosis was documented in both MDM as applicable and the Disposition within this note       Time User Action Codes Description Comment    10/23/2024  9:31 PM Krunal Palmer Add [F32.A,  R45.851] Depression with suicidal ideation     10/24/2024  1:40 PM JoseLee Ann Add [Z00.00] Evaluation by medical service required           ED Disposition       ED Disposition   Transfer to Behavioral Health    [facility]   --    Date/Time   Thu Oct 24, 2024  2:36 PM    Comment   Gita Rendon should be transferred out to  and has been medically cleared.               Assessment & Plan       Medical Decision Making  Presenting with increasing suicidal ideation/depression certainly at least exacerbated by migraine headaches, although obviously not entirely attributable to this. Will treat her current migraine symptoms.  Will perform concurrent behavioral health clearance testing including testing for electrolyte disturbances that might be caused by use of acetazolamide (e.g., metabolic acidosis, etc.).  Will have patient discuss with crisis worker for further evaluation.    Amount and/or Complexity of Data Reviewed  Labs: ordered. Decision-making details documented in ED Course.    Risk  Prescription drug management.  Decision regarding hospitalization.        ED Course as of 10/25/24 1430   Wed Oct 23, 2024   1915 CBC and differential(!)   1936 Magnesium   1936 Ethanol   1936 Basic metabolic panel(!)   1943 Laboratory testing notable for a mildly elevated chloride level mildly elevated BUN.  Otherwise normal electrolytes.  Negative ethanol.  Awaiting drug screen, but will have patient discuss with crisis worker shortly.   2004 hCG, qualitative pregnancy   2004 Will have crisis worker discuss with patient.   2025 Rapid drug screen, urine   2131 Crisis worker discussed with patient and requested psychiatry consult. Order placed for psychiatry consultation.   2248 Daily meds have been  ordered.  Repeat BP significantly improved.  Other vital signs remain normal.  Awaiting psychiatric consultation.   0130: Sign over to Dr Parker  Pending psychiatry consultation and disposition accordingly  Otherwise medically stable for inpatient  treatment    Medications   diphenhydrAMINE (BENADRYL) injection 25 mg (25 mg Intravenous Given 10/23/24 1904)   metoclopramide (REGLAN) injection 10 mg (10 mg Intravenous Given 10/23/24 1909)   ketorolac (TORADOL) injection 9.9 mg (9.9 mg Intravenous Given 10/23/24 1908)   dexamethasone (PF) (DECADRON) injection 10 mg (10 mg Intravenous Given 10/23/24 1911)       ED Risk Strat Scores                           SBIRT 20yo+      Flowsheet Row Most Recent Value   Initial Alcohol Screen: US AUDIT-C     1. How often do you have a drink containing alcohol? 0 Filed at: 10/23/2024 1832   2. How many drinks containing alcohol do you have on a typical day you are drinking?  0 Filed at: 10/23/2024 1832   3a. Male UNDER 65: How often do you have five or more drinks on one occasion? 0 Filed at: 10/23/2024 1832   3b. FEMALE Any Age, or MALE 65+: How often do you have 4 or more drinks on one occassion? 0 Filed at: 10/23/2024 1832   Audit-C Score 0 Filed at: 10/23/2024 1832   JENNIFER: How many times in the past year have you...    Used an illegal drug or used a prescription medication for non-medical reasons? Never Filed at: 10/23/2024 1832                     Patient medically cleared for behavioral health evaluation.       History of Present Illness       Chief Complaint   Patient presents with    Depression     Pt reports she has been having worsening depression over the past year and also has hx of migraines. Patient reports ketamine treatment weekly on Wednesdays for depression treatment but is having residual headaches associated with the treatment. Patient reports SI. Patient has plan to OD with medication like propanolol because she knows it will slow her heart rate down and admits  to googling other options to commit suicide. Patient denies making an attempt and explains she is here to receive.        Past Medical History:   Diagnosis Date    Allergic     Allergic rhinitis     Anorexia nervosa in remission     Anxiety     Arthritis 5/8/2014    Back pain     Bipolar disorder (HCC)     Depression     Dermatitis 10/16/2021    Disease of thyroid gland     Diverticulitis of colon 9/20/2015    Dizziness     Ear problems     Eating disorder     Esophageal reflux 08/15/2013    GERD (gastroesophageal reflux disease) 8/15/2013    Headache(784.0)     Headache, tension-type     Hypertension     Hypothyroid     Idiopathic intracranial hypertension     Lumbar degenerative disc disease 05/08/2014    Migraine     Nasal congestion     Nosebleed     Obesity     Obsessive-compulsive disorder     Otitis media     Overactive bladder     Psychiatric disorder     Psychiatric illness     Restless leg syndrome, controlled     Seasonal allergies     Sinusitis     Sleep apnea     Sleep difficulties     Suicide and self-inflicted injury (HCC)     Tinnitus     TMJ dysfunction     Tonsillitis     Transcranial Magnetic Stimulation 09/06/2021    Urinary tract infection     Vision loss       Past Surgical History:   Procedure Laterality Date    DENTAL SURGERY      wisdom teeth-at [age] years. Other surgery dental extraxtion of bone spur (10 years ago)    IR LUMBAR PUNCTURE  02/26/2019    SINUS ENDOSCOPY      SINUS SURGERY      TONSILLECTOMY        Family History   Problem Relation Age of Onset    Mental illness Mother     Depression Mother     Suicide Attempts Mother     Hypertension Mother     Diabetes Mother     Other Mother         bicuspid aortic valve    Anxiety disorder Mother     Psychiatric Illness Mother     Schizoaffective Disorder  Mother     Anemia Mother     Hypertension Father     Hypothyroidism Father     Thyroid disease Father     Hypertension Brother     Cancer Maternal Grandmother     Heart disease Maternal  Grandmother     Stroke Maternal Grandmother     Breast cancer Maternal Grandmother     Skin cancer Maternal Grandmother     Arthritis Maternal Grandmother     Pneumonia Maternal Grandfather     Cancer Maternal Grandfather     Dementia Maternal Grandfather     COPD Maternal Grandfather     Cancer Paternal Grandmother     Pneumonia Paternal Grandfather     Arthritis Family     Breast cancer Family     Osteopenia Family     Osteoporosis Family     Hypertension Family     Transient ischemic attack Family       Social History     Tobacco Use    Smoking status: Never     Passive exposure: Never    Smokeless tobacco: Never   Vaping Use    Vaping status: Never Used   Substance Use Topics    Alcohol use: Never    Drug use: Never      E-Cigarette/Vaping    E-Cigarette Use Never User       E-Cigarette/Vaping Substances    Nicotine No     THC No     CBD No     Flavoring No     Other No     Unknown No       I have reviewed and agree with the history as documented.     39-year-old woman with noted past medical history presents to the emergency department stating she is feeling increasingly depressed over the past several weeks with occasional thoughts of self-harm although no organized plan for doing so. Started ketamine therapy for depression within past 2 months; unfortunately, feels that ketamine treatments have worsened her longstanding migraine headaches. Notes that the severity migraine headaches is such that it is quite limiting to social interactions and indeed even to her ability to leave the house: feels that this is contributing to worsening depression symptoms and possibly even to thoughts of self-harm.  Had previously had a loose plan to overdose on multiple medications although does not have active thoughts of doing so now.    Has diagnosis of migraine headache disorder as well as IIH; did see her ophthalmologist 2d prior who told her that exam was normal w/r/t ocular abnormalities. She is taking her prescribed  acetazolamide consistently. This had previously produced a significant metabolic acidosis to the point where the dose had to be decreased, but she is taking it consistently now.    Reports current dull/throbbing HA with nausea comparable to prior migraine headache. No preceding head trauma or injury. No fever/chills/vision changes/neck pain. No vomiting.   She has been medically disabled d/t mental health disorders since age 21.   Notes multiple recent stressors particularly related to her living situation and family loss (she lives with former significant other who is currently engaged to another individual; within the past 3yr has experienced death of her father/stepfather and development of significant dementia in her mother). She would like over the long-term to live in a residential mental health facility.  No thoughts of harming anyone else.  No evident hallucinations or delusions.  No marked changes in appetite.  She is desirous of inpatient behavioral treatment now.      History provided by:  Patient and medical records  Depression  Presenting symptoms: suicidal thoughts    Presenting symptoms: no agitation, no hallucinations and no self-mutilation    Associated symptoms: anxiety and headaches    Associated symptoms: no abdominal pain and no fatigue        Review of Systems   Constitutional:  Negative for fatigue and fever.   Eyes:  Positive for photophobia. Negative for visual disturbance.   Respiratory: Negative.     Cardiovascular: Negative.    Gastrointestinal:  Negative for abdominal pain, nausea and vomiting.   Musculoskeletal: Negative.    Skin: Negative.    Neurological:  Positive for headaches. Negative for seizures, speech difficulty, light-headedness and numbness.   Psychiatric/Behavioral:  Positive for depression, dysphoric mood, sleep disturbance and suicidal ideas. Negative for agitation, behavioral problems, hallucinations and self-injury. The patient is nervous/anxious. The patient is not  hyperactive.            Objective       ED Triage Vitals   Temperature Pulse Blood Pressure Respirations SpO2 Patient Position - Orthostatic VS   10/23/24 1901 10/23/24 1829 10/23/24 1829 10/23/24 1829 10/23/24 1829 10/23/24 1829   98.2 °F (36.8 °C) 69 (!) 179/107 16 95 % Sitting      Temp Source Heart Rate Source BP Location FiO2 (%) Pain Score    10/23/24 1901 10/23/24 1829 10/23/24 1829 -- 10/23/24 1829    Temporal Monitor Right arm  5      Vitals      Date and Time Temp Pulse SpO2 Resp BP Pain Score FACES Pain Rating User   10/24/24 0742 98 °F (36.7 °C) 77 99 % 20 111/55 -- -- EV   10/23/24 2200 98.1 °F (36.7 °C) 63 96 % 16 115/55 1 -- RJP   10/23/24 1908 -- -- -- -- -- 5 -- LD   10/23/24 1901 98.2 °F (36.8 °C) -- -- -- -- -- -- LD   10/23/24 1829 -- 69 95 % 16 179/107 5 -- LD            Physical Exam  Vitals and nursing note reviewed.   Constitutional:       General: She is awake. She is not in acute distress.     Appearance: Normal appearance. She is well-developed.   HENT:      Head: Normocephalic and atraumatic.      Right Ear: Hearing and external ear normal.      Left Ear: Hearing and external ear normal.   Eyes:      General: Lids are normal. Vision grossly intact.      Extraocular Movements: Extraocular movements intact.      Conjunctiva/sclera: Conjunctivae normal.      Pupils: Pupils are equal, round, and reactive to light.      Funduscopic exam:     Right eye: No hemorrhage, AV nicking or papilledema. Red reflex present.         Left eye: No hemorrhage, AV nicking or papilledema. Red reflex present.  Neck:      Trachea: Trachea and phonation normal.   Cardiovascular:      Rate and Rhythm: Normal rate and regular rhythm.      Pulses:           Radial pulses are 2+ on the right side and 2+ on the left side.        Dorsalis pedis pulses are 2+ on the right side and 2+ on the left side.        Posterior tibial pulses are 2+ on the right side and 2+ on the left side.      Heart sounds: Normal heart sounds,  S1 normal and S2 normal. No murmur heard.     No friction rub. No gallop.   Pulmonary:      Effort: Pulmonary effort is normal. No respiratory distress.      Breath sounds: Normal breath sounds. No stridor. No decreased breath sounds, wheezing, rhonchi or rales.   Abdominal:      General: There is no distension.      Palpations: There is no mass.      Tenderness: There is no abdominal tenderness. There is no guarding or rebound.   Skin:     General: Skin is warm and dry.   Neurological:      Mental Status: She is alert and oriented to person, place, and time.      GCS: GCS eye subscore is 4. GCS verbal subscore is 5. GCS motor subscore is 6.      Cranial Nerves: No cranial nerve deficit.      Sensory: No sensory deficit.      Motor: No abnormal muscle tone.      Comments: PERRLA; EOMI. Sensation intact to light touch over face in V1-V3 distribution bilaterally. Facial expressions symmetric. Tongue/uvula midline. Shoulder shrug equal bilaterally. Strength 5/5 in UE/LE bilaterally. Sensation intact to light touch in UE/LE bilaterally.   Psychiatric:      Comments: Mental Status Exam:    Appearance:  Dressed in clean, well-maintained street cloths. Normal grooming and hygiene.    Attitude:  Calm and cooperative    Behavior:  No unusual movements or posturing    Speech:  Normal rate  Normal tone  Normal volume  Unpressured    Affect:  Reactive and mood congruent  Normal range    Mood:  Depressed    Thought processes:  Goal-directed and logical    Thought content:    Suicidality:  Passive thoughts of suicide  No active plan  Had previously planned to overdose on multiple medications but not actively planning to do so now    Homicidality:  None    No obvious delusions/compulsions/phobias    Perception:  No obvious hallucinations/delusions    Orientation:  Person/place/time/self    Memory/concentration:  Short-term memory intact  Long-term memory intact    Insight:  Fair    Judgment:  Fair               Results Reviewed        Procedure Component Value Units Date/Time    Rapid drug screen, urine [[id number]]  (Normal) Collected: 10/23/24 1957    Lab Status: Final result Specimen: Urine, Clean Catch Updated: 10/23/24 2023     Amph/Meth UR Negative     Barbiturate Ur Negative     Benzodiazepine Urine Negative     Cocaine Urine Negative     Methadone Urine Negative     Opiate Urine Negative     PCP Ur Negative     THC Urine Negative     Oxycodone Urine Negative     Fentanyl Urine Negative     HYDROCODONE URINE Negative    Narrative:      FOR MEDICAL PURPOSES ONLY.   IF CONFIRMATION NEEDED PLEASE CONTACT THE LAB WITHIN 5 DAYS.    Drug Screen Cutoff Levels:  AMPHETAMINE/METHAMPHETAMINES  1000 ng/mL  BARBITURATES     200 ng/mL  BENZODIAZEPINES     200 ng/mL  COCAINE      300 ng/mL  METHADONE      300 ng/mL  OPIATES      300 ng/mL  PHENCYCLIDINE     25 ng/mL  THC       50 ng/mL  OXYCODONE      100 ng/mL  FENTANYL      5 ng/mL  HYDROCODONE     300 ng/mL    hCG, qualitative pregnancy [[id number]]  (Normal) Collected: 10/23/24 1901    Lab Status: Final result Specimen: Blood from Arm, Left Updated: 10/23/24 2002     Preg, Serum Negative    Basic metabolic panel [[id number]]  (Abnormal) Collected: 10/23/24 1901    Lab Status: Final result Specimen: Blood from Arm, Left Updated: 10/23/24 1930     Sodium 140 mmol/L      Potassium 3.7 mmol/L      Chloride 112 mmol/L      CO2 21 mmol/L      ANION GAP 7 mmol/L      BUN 28 mg/dL      Creatinine 0.75 mg/dL      Glucose 90 mg/dL      Calcium 9.0 mg/dL      eGFR 100 ml/min/1.73sq m     Narrative:      [employer] guidelines for Chronic Kidney Disease (CKD):     Stage 1 with normal or high GFR (GFR > 90 mL/min/1.73 square meters)    Stage 2 Mild CKD (GFR = 60-89 mL/min/1.73 square meters)    Stage 3A Moderate CKD (GFR = 45-59 mL/min/1.73 square meters)    Stage 3B Moderate CKD (GFR = 30-44 mL/min/1.73 square meters)    Stage 4 Severe CKD (GFR = 15-29 mL/min/1.73 square meters)     Stage 5 End Stage CKD (GFR <15 mL/min/1.73 square meters)  Note: GFR calculation is accurate only with a steady state creatinine    Magnesium [[id number]]  (Normal) Collected: 10/23/24 1901    Lab Status: Final result Specimen: Blood from Arm, Left Updated: 10/23/24 1930     Magnesium 1.9 mg/dL     Ethanol [[id number]]  (Normal) Collected: 10/23/24 1901    Lab Status: Final result Specimen: Blood from Arm, Left Updated: 10/23/24 1928     Ethanol Lvl <10 mg/dL     CBC and differential [[id number]]  (Abnormal) Collected: 10/23/24 1901    Lab Status: Final result Specimen: Blood from Arm, Left Updated: 10/23/24 1913     WBC 11.91 Thousand/uL      RBC 4.80 Million/uL      Hemoglobin 13.5 g/dL      Hematocrit 42.5 %      MCV 89 fL      MCH 28.1 pg      MCHC 31.8 g/dL      RDW 14.0 %      MPV 9.2 fL      Platelets 324 Thousands/uL      nRBC 0 /100 WBCs      Segmented % 62 %      Immature Grans % 0 %      Lymphocytes % 25 %      Monocytes % 7 %      Eosinophils Relative 5 %      Basophils Relative 1 %      Absolute Neutrophils 7.44 Thousands/µL      Absolute Immature Grans 0.04 Thousand/uL      Absolute Lymphocytes 2.92 Thousands/µL      Absolute Monocytes 0.82 Thousand/µL      Eosinophils Absolute 0.62 Thousand/µL      Basophils Absolute 0.07 Thousands/µL             No orders to display       Procedures    ED Medication and Procedure Management   Prior to Admission Medications   Prescriptions Last Dose Informant Patient Reported? Taking?   Ajovy 225 MG/1.5ML auto-injector  Self Yes No   Sig: Inject 225 mg under the skin every 30 (thirty) days Last  weeks  not  sure  what day   Cequa 0.09 % SOLN  Self No No   Sig: Apply 1 drop to eye 2 (two) times a day   DULoxetine (CYMBALTA) 60 mg delayed release capsule  Self No No   Sig: Take 1 capsule (60 mg total) by mouth daily   Mirabegron ER 50 MG TB24  Self No No   Sig: Take 1 tablet (50 mg total) by mouth in the morning   Multiple Vitamin (MULTIVITAMIN) capsule  Self Yes No   Sig:  Take 1 capsule by mouth daily   Nurtec 75 MG TBDP  Self Yes No   Sig: Take 75 mg by mouth every other day   Semaglutide-Weight Management (WEGOVY) 1.7 MG/0.75ML  Self No No   Sig: Inject 0.75 mL (1.7 mg total) under the skin once a week   acetaZOLAMIDE (DIAMOX) 250 mg tablet  Self No No   Sig: take 3 tablets by mouth twice a day   Patient taking differently: Take 250 mg by mouth 2 (two) times a day 2 tablets two times a day   celecoxib (CeleBREX) 200 mg capsule  Self No No   Sig: Take 1 capsule (200 mg total) by mouth 2 (two) times a day   cetirizine (ZyrTEC) 10 MG chewable tablet  Self Yes No   Sig: Chew 10 mg daily   cholecalciferol 1,000 units tablet  Self No No   Sig: Take 5 tablets (5,000 Units total) by mouth daily   famotidine (PEPCID) 20 mg tablet  Self No No   Sig: Take 1 tablet (20 mg total) by mouth 2 (two) times a day   Patient not taking: Reported on 10/24/2024   fluvoxaMINE (LUVOX) 50 mg tablet  Self Yes No   Sig: Take 50 mg by mouth daily   gabapentin (NEURONTIN) 300 mg capsule  Self No No   Sig: Take 1 capsule (300 mg total) by mouth daily after breakfast   Patient taking differently: Take 300 mg by mouth daily after breakfast Morning   gabapentin (NEURONTIN) 600 MG tablet  Self No No   Sig: Take 1.5 tablets (900 mg total) by mouth daily at bedtime   hydrocortisone (ANUSOL-HC) 2.5 % rectal cream  Self No No   Sig: Apply topically 2 (two) times a day   Patient not taking: Reported on 10/25/2024   ipratropium (ATROVENT) 0.03 % nasal spray  Self No No   Si sprays into each nostril every 12 (twelve) hours   Patient not taking: Reported on 10/24/2024   levothyroxine 112 mcg tablet  Self No No   Sig: Take 1 tablet (112 mcg total) by mouth daily in the early morning   linaCLOtide (Linzess) 290 MCG CAPS  Self No No   Sig: Take 1 capsule by mouth daily   metFORMIN (GLUCOPHAGE) 500 mg tablet  Self No No   Sig: Take 1 tablet (500 mg total) by mouth daily with breakfast   omeprazole (PriLOSEC) 20 mg delayed  release capsule  Self No No   Sig: Take 1 capsule (20 mg total) by mouth 2 (two) times a day   ondansetron (ZOFRAN) 4 mg tablet  Self No No   Sig: Take 1 tablet (4 mg total) by mouth every 6 (six) hours   Patient not taking: Reported on 10/24/2024   prochlorperazine (COMPAZINE) 10 mg tablet  Self Yes No   Sig: Take 10 mg by mouth every 6 (six) hours as needed for nausea or vomiting Take 1 tablet by TID if needed for headache or nausea. Limit to 2x a week .   Patient not taking: Reported on 10/24/2024   spironolactone (ALDACTONE) 25 mg tablet  Self No No   Sig: Take 1 tablet (25 mg total) by mouth daily   sucralfate (CARAFATE) 1 g tablet  Self No No   Sig: Take 1 tablet (1 g total) by mouth 3 (three) times a day for 10 days   topiramate (TOPAMAX) 100 mg tablet  Self Yes No   Sig: Take 150 mg by mouth 2 (two) times a day TAKE 1.5 TABLETS BID      Facility-Administered Medications: None     Discharge Medication List as of 10/24/2024  3:23 PM        CONTINUE these medications which have NOT CHANGED    Details   acetaZOLAMIDE (DIAMOX) 250 mg tablet take 3 tablets by mouth twice a day, Starting Tue 10/8/2024, Normal      Ajovy 225 MG/1.5ML auto-injector Inject 225 mg under the skin every 30 (thirty) days, Starting Wed 9/11/2024, Historical Med      celecoxib (CeleBREX) 200 mg capsule Take 1 capsule (200 mg total) by mouth 2 (two) times a day, Starting Tue 4/16/2024, Normal      Cequa 0.09 % SOLN Apply 1 drop to eye 2 (two) times a day, Starting Sat 8/5/2023, Normal      cetirizine (ZyrTEC) 10 MG chewable tablet Chew 10 mg daily, Historical Med      cholecalciferol 1,000 units tablet Take 5 tablets (5,000 Units total) by mouth daily, Starting Tue 4/16/2024, Normal      DULoxetine (CYMBALTA) 60 mg delayed release capsule Take 1 capsule (60 mg total) by mouth daily, Starting Tue 7/16/2024, Normal      famotidine (PEPCID) 20 mg tablet Take 1 tablet (20 mg total) by mouth 2 (two) times a day, Starting Tue 9/17/2024, Normal       fluvoxaMINE (LUVOX) 50 mg tablet Take 50 mg by mouth daily, Starting Mon 9/23/2024, Historical Med      gabapentin (NEURONTIN) 300 mg capsule Take 1 capsule (300 mg total) by mouth daily after breakfast, Starting Fri 7/12/2024, No Print      gabapentin (NEURONTIN) 600 MG tablet Take 1.5 tablets (900 mg total) by mouth daily at bedtime, Starting Tue 4/16/2024, Normal      hydrocortisone (ANUSOL-HC) 2.5 % rectal cream Apply topically 2 (two) times a day, Starting Wed 6/26/2024, Normal      ipratropium (ATROVENT) 0.03 % nasal spray 2 sprays into each nostril every 12 (twelve) hours, Starting Mon 7/22/2024, Normal      levothyroxine 112 mcg tablet Take 1 tablet (112 mcg total) by mouth daily in the early morning, Starting Mon 4/29/2024, Normal      linaCLOtide (Linzess) 290 MCG CAPS Take 1 capsule by mouth daily, Starting Sun 8/18/2024, Until Fri 2/14/2025, Normal      metFORMIN (GLUCOPHAGE) 500 mg tablet Take 1 tablet (500 mg total) by mouth daily with breakfast, Starting Wed 6/26/2024, Normal      Mirabegron ER 50 MG TB24 Take 1 tablet (50 mg total) by mouth in the morning, Starting Sun 9/29/2024, Normal      Multiple Vitamin (MULTIVITAMIN) capsule Take 1 capsule by mouth daily, Historical Med      Nurtec 75 MG TBDP Take 75 mg by mouth, Starting Tue 9/3/2024, Historical Med      omeprazole (PriLOSEC) 20 mg delayed release capsule Take 1 capsule (20 mg total) by mouth 2 (two) times a day, Starting Mon 6/17/2024, Normal      ondansetron (ZOFRAN) 4 mg tablet Take 1 tablet (4 mg total) by mouth every 6 (six) hours, Starting Tue 9/17/2024, Normal      prochlorperazine (COMPAZINE) 10 mg tablet Take 10 mg by mouth every 6 (six) hours as needed for nausea or vomiting Take 1 tablet by TID if needed for headache or nausea. Limit to 2x a week ., Historical Med      Semaglutide-Weight Management (WEGOVY) 1.7 MG/0.75ML Inject 0.75 mL (1.7 mg total) under the skin once a week, Starting Tue 9/24/2024, Normal      spironolactone  (ALDACTONE) 25 mg tablet Take 1 tablet (25 mg total) by mouth daily, Starting Mon 9/23/2024, Normal      sucralfate (CARAFATE) 1 g tablet Take 1 tablet (1 g total) by mouth 3 (three) times a day for 10 days, Starting Tue 9/17/2024, Until Fri 9/27/2024, Normal      topiramate (TOPAMAX) 100 mg tablet TAKE 1.5 TABLETS BID, Starting Sat 6/1/2024, Historical Med           No discharge procedures on file.  ED SEPSIS DOCUMENTATION   Time reflects when diagnosis was documented in both MDM as applicable and the Disposition within this note       Time User Action Codes Description Comment    10/23/2024  9:31 PM Krunal Palmer [F32.A,  R45.851] Depression with suicidal ideation     10/24/2024  1:40 PM Lee Ann Garcia [Z00.00] Evaluation by medical service required                  Krunal Palmer DO  10/25/24 4577

## 2024-10-23 NOTE — ED TRIAGE NOTES
Pt reports she has been having worsening depression over the past year and also has hx of migraines. Patient reports ketamine treatment weekly on Wednesdays for depression treatment but is having residual headaches associated with the treatment. Patient reports SI. Patient has plan to OD with medication like propanolol because she knows it will slow her heart rate down and admits to googling other options to commit suicide. Patient denies making an attempt and explains she is here to receive.     Patient reports she is feeling lonely at home, living with ex-gf who is newly engaged but not regularly around the house. Patient reports migraines lasting days on end and isolation in the home.

## 2024-10-23 NOTE — ED NOTES
Proper sized paper scrubs are unavailable for this patient. Patient is currently in a t-shirt and basketball shorts. The string has been removed from the shorts and pockets have been emptied. Provider made aware and approved accomodation. Patient resting comfortably on the ER stretcher with an in-person 1:1 present.     Jose Gardiner  10/23/24 1921

## 2024-10-24 ENCOUNTER — HOSPITAL ENCOUNTER (INPATIENT)
Facility: HOSPITAL | Age: 39
LOS: 12 days | Discharge: HOME | DRG: 885 | End: 2024-11-05
Attending: HOSPITALIST | Admitting: HOSPITALIST
Payer: COMMERCIAL

## 2024-10-24 VITALS
DIASTOLIC BLOOD PRESSURE: 55 MMHG | SYSTOLIC BLOOD PRESSURE: 111 MMHG | OXYGEN SATURATION: 99 % | TEMPERATURE: 98 F | RESPIRATION RATE: 20 BRPM | HEART RATE: 77 BPM

## 2024-10-24 DIAGNOSIS — Z00.00 EVALUATION BY MEDICAL SERVICE REQUIRED: ICD-10-CM

## 2024-10-24 DIAGNOSIS — F33.2 MAJOR DEPRESSIVE DISORDER, RECURRENT, SEVERE WITHOUT PSYCHOTIC FEATURES (HCC): Primary | ICD-10-CM

## 2024-10-24 DIAGNOSIS — G93.2 INTRACRANIAL HYPERTENSION: ICD-10-CM

## 2024-10-24 DIAGNOSIS — F41.1 GAD (GENERALIZED ANXIETY DISORDER): ICD-10-CM

## 2024-10-24 PROCEDURE — 99223 1ST HOSP IP/OBS HIGH 75: CPT | Performed by: HOSPITALIST

## 2024-10-24 PROCEDURE — 99215 OFFICE O/P EST HI 40 MIN: CPT | Performed by: STUDENT IN AN ORGANIZED HEALTH CARE EDUCATION/TRAINING PROGRAM

## 2024-10-24 PROCEDURE — 99205 OFFICE O/P NEW HI 60 MIN: CPT | Performed by: STUDENT IN AN ORGANIZED HEALTH CARE EDUCATION/TRAINING PROGRAM

## 2024-10-24 RX ORDER — HALOPERIDOL 5 MG/ML
5 INJECTION INTRAMUSCULAR
Status: DISCONTINUED | OUTPATIENT
Start: 2024-10-24 | End: 2024-11-05 | Stop reason: HOSPADM

## 2024-10-24 RX ORDER — LORAZEPAM 2 MG/ML
2 INJECTION INTRAMUSCULAR
Status: DISCONTINUED | OUTPATIENT
Start: 2024-10-24 | End: 2024-11-05 | Stop reason: HOSPADM

## 2024-10-24 RX ORDER — DIPHENHYDRAMINE HYDROCHLORIDE 50 MG/ML
50 INJECTION INTRAMUSCULAR; INTRAVENOUS EVERY 6 HOURS PRN
Status: CANCELLED | OUTPATIENT
Start: 2024-10-24

## 2024-10-24 RX ORDER — RISPERIDONE 0.25 MG/1
1 TABLET ORAL
Status: CANCELLED | OUTPATIENT
Start: 2024-10-24

## 2024-10-24 RX ORDER — ACETAMINOPHEN 325 MG/1
650 TABLET ORAL EVERY 4 HOURS PRN
Status: CANCELLED | OUTPATIENT
Start: 2024-10-24

## 2024-10-24 RX ORDER — ACETAMINOPHEN 325 MG/1
975 TABLET ORAL EVERY 6 HOURS PRN
Status: DISCONTINUED | OUTPATIENT
Start: 2024-10-24 | End: 2024-11-05 | Stop reason: HOSPADM

## 2024-10-24 RX ORDER — ACETAMINOPHEN 325 MG/1
975 TABLET ORAL EVERY 6 HOURS PRN
Status: CANCELLED | OUTPATIENT
Start: 2024-10-24

## 2024-10-24 RX ORDER — LORAZEPAM 2 MG/ML
1 INJECTION INTRAMUSCULAR
Status: CANCELLED | OUTPATIENT
Start: 2024-10-24

## 2024-10-24 RX ORDER — BENZTROPINE MESYLATE 1 MG/ML
0.5 INJECTION, SOLUTION INTRAMUSCULAR; INTRAVENOUS
Status: DISCONTINUED | OUTPATIENT
Start: 2024-10-24 | End: 2024-11-05 | Stop reason: HOSPADM

## 2024-10-24 RX ORDER — DIPHENHYDRAMINE HYDROCHLORIDE 50 MG/ML
50 INJECTION INTRAMUSCULAR; INTRAVENOUS EVERY 6 HOURS PRN
Status: DISCONTINUED | OUTPATIENT
Start: 2024-10-24 | End: 2024-11-05 | Stop reason: HOSPADM

## 2024-10-24 RX ORDER — HALOPERIDOL 5 MG/ML
2.5 INJECTION INTRAMUSCULAR
Status: DISCONTINUED | OUTPATIENT
Start: 2024-10-24 | End: 2024-11-05 | Stop reason: HOSPADM

## 2024-10-24 RX ORDER — RISPERIDONE 1 MG/1
1 TABLET ORAL
Status: DISCONTINUED | OUTPATIENT
Start: 2024-10-24 | End: 2024-11-05 | Stop reason: HOSPADM

## 2024-10-24 RX ORDER — ACETAMINOPHEN 325 MG/1
650 TABLET ORAL EVERY 6 HOURS PRN
Status: CANCELLED | OUTPATIENT
Start: 2024-10-24

## 2024-10-24 RX ORDER — HYDROXYZINE HYDROCHLORIDE 50 MG/1
100 TABLET, FILM COATED ORAL
Status: DISCONTINUED | OUTPATIENT
Start: 2024-10-24 | End: 2024-11-05 | Stop reason: HOSPADM

## 2024-10-24 RX ORDER — HYDROXYZINE HYDROCHLORIDE 25 MG/1
50 TABLET, FILM COATED ORAL
Status: CANCELLED | OUTPATIENT
Start: 2024-10-24

## 2024-10-24 RX ORDER — HYDROXYZINE HYDROCHLORIDE 25 MG/1
25 TABLET, FILM COATED ORAL
Status: CANCELLED | OUTPATIENT
Start: 2024-10-24

## 2024-10-24 RX ORDER — BENZTROPINE MESYLATE 1 MG/ML
1 INJECTION, SOLUTION INTRAMUSCULAR; INTRAVENOUS
Status: DISCONTINUED | OUTPATIENT
Start: 2024-10-24 | End: 2024-11-05 | Stop reason: HOSPADM

## 2024-10-24 RX ORDER — RISPERIDONE 0.25 MG/1
0.5 TABLET ORAL
Status: CANCELLED | OUTPATIENT
Start: 2024-10-24

## 2024-10-24 RX ORDER — PROPRANOLOL HYDROCHLORIDE 10 MG/1
10 TABLET ORAL EVERY 8 HOURS PRN
Status: DISCONTINUED | OUTPATIENT
Start: 2024-10-24 | End: 2024-11-05 | Stop reason: HOSPADM

## 2024-10-24 RX ORDER — LORAZEPAM 2 MG/ML
2 INJECTION INTRAMUSCULAR EVERY 6 HOURS PRN
Status: DISCONTINUED | OUTPATIENT
Start: 2024-10-24 | End: 2024-11-05 | Stop reason: HOSPADM

## 2024-10-24 RX ORDER — RISPERIDONE 0.25 MG/1
0.25 TABLET ORAL
Status: CANCELLED | OUTPATIENT
Start: 2024-10-24

## 2024-10-24 RX ORDER — ACETAMINOPHEN 325 MG/1
650 TABLET ORAL EVERY 4 HOURS PRN
Status: DISCONTINUED | OUTPATIENT
Start: 2024-10-24 | End: 2024-11-05 | Stop reason: HOSPADM

## 2024-10-24 RX ORDER — BENZTROPINE MESYLATE 1 MG/ML
0.5 INJECTION, SOLUTION INTRAMUSCULAR; INTRAVENOUS
Status: CANCELLED | OUTPATIENT
Start: 2024-10-24

## 2024-10-24 RX ORDER — HYDROXYZINE HYDROCHLORIDE 25 MG/1
25 TABLET, FILM COATED ORAL
Status: DISCONTINUED | OUTPATIENT
Start: 2024-10-24 | End: 2024-11-05 | Stop reason: HOSPADM

## 2024-10-24 RX ORDER — BENZTROPINE MESYLATE 1 MG/ML
1 INJECTION, SOLUTION INTRAMUSCULAR; INTRAVENOUS
Status: CANCELLED | OUTPATIENT
Start: 2024-10-24

## 2024-10-24 RX ORDER — FOLIC ACID 1 MG/1
1 TABLET ORAL DAILY
COMMUNITY
End: 2024-11-05

## 2024-10-24 RX ORDER — AMOXICILLIN 250 MG
1 CAPSULE ORAL DAILY PRN
Status: DISCONTINUED | OUTPATIENT
Start: 2024-10-24 | End: 2024-10-30

## 2024-10-24 RX ORDER — HALOPERIDOL 5 MG/ML
2.5 INJECTION INTRAMUSCULAR
Status: CANCELLED | OUTPATIENT
Start: 2024-10-24

## 2024-10-24 RX ORDER — HYDROXYZINE HYDROCHLORIDE 25 MG/1
100 TABLET, FILM COATED ORAL
Status: CANCELLED | OUTPATIENT
Start: 2024-10-24

## 2024-10-24 RX ORDER — LORAZEPAM 2 MG/ML
2 INJECTION INTRAMUSCULAR
Status: CANCELLED | OUTPATIENT
Start: 2024-10-24

## 2024-10-24 RX ORDER — TRAZODONE HYDROCHLORIDE 50 MG/1
50 TABLET, FILM COATED ORAL
Status: CANCELLED | OUTPATIENT
Start: 2024-10-24

## 2024-10-24 RX ORDER — TRAZODONE HYDROCHLORIDE 50 MG/1
50 TABLET, FILM COATED ORAL
Status: DISCONTINUED | OUTPATIENT
Start: 2024-10-24 | End: 2024-11-05 | Stop reason: HOSPADM

## 2024-10-24 RX ORDER — MAGNESIUM HYDROXIDE/ALUMINUM HYDROXICE/SIMETHICONE 120; 1200; 1200 MG/30ML; MG/30ML; MG/30ML
30 SUSPENSION ORAL EVERY 4 HOURS PRN
Status: DISCONTINUED | OUTPATIENT
Start: 2024-10-24 | End: 2024-11-05 | Stop reason: HOSPADM

## 2024-10-24 RX ORDER — ACETAMINOPHEN 325 MG/1
650 TABLET ORAL EVERY 6 HOURS PRN
Status: DISCONTINUED | OUTPATIENT
Start: 2024-10-24 | End: 2024-11-05 | Stop reason: HOSPADM

## 2024-10-24 RX ORDER — RISPERIDONE 0.5 MG/1
0.25 TABLET ORAL
Status: DISCONTINUED | OUTPATIENT
Start: 2024-10-24 | End: 2024-11-05 | Stop reason: HOSPADM

## 2024-10-24 RX ORDER — BENZTROPINE MESYLATE 1 MG/1
1 TABLET ORAL
Status: DISCONTINUED | OUTPATIENT
Start: 2024-10-24 | End: 2024-11-05 | Stop reason: HOSPADM

## 2024-10-24 RX ORDER — AMOXICILLIN 250 MG
1 CAPSULE ORAL DAILY PRN
Status: CANCELLED | OUTPATIENT
Start: 2024-10-24

## 2024-10-24 RX ORDER — POLYETHYLENE GLYCOL 3350 17 G/17G
17 POWDER, FOR SOLUTION ORAL DAILY PRN
Status: CANCELLED | OUTPATIENT
Start: 2024-10-24

## 2024-10-24 RX ORDER — DOCUSATE SODIUM 100 MG/1
100 CAPSULE, LIQUID FILLED ORAL 2 TIMES DAILY
COMMUNITY

## 2024-10-24 RX ORDER — RISPERIDONE 0.5 MG/1
0.5 TABLET ORAL
Status: DISCONTINUED | OUTPATIENT
Start: 2024-10-24 | End: 2024-11-05 | Stop reason: HOSPADM

## 2024-10-24 RX ORDER — HYDROXYZINE HYDROCHLORIDE 50 MG/1
50 TABLET, FILM COATED ORAL
Status: DISCONTINUED | OUTPATIENT
Start: 2024-10-24 | End: 2024-11-05 | Stop reason: HOSPADM

## 2024-10-24 RX ORDER — LORAZEPAM 2 MG/ML
2 INJECTION INTRAMUSCULAR EVERY 6 HOURS PRN
Status: CANCELLED | OUTPATIENT
Start: 2024-10-24

## 2024-10-24 RX ORDER — PROPRANOLOL HCL 20 MG
10 TABLET ORAL EVERY 8 HOURS PRN
Status: CANCELLED | OUTPATIENT
Start: 2024-10-24

## 2024-10-24 RX ORDER — PERPHENAZINE 4 MG
500 TABLET ORAL 2 TIMES DAILY
COMMUNITY
End: 2024-11-05

## 2024-10-24 RX ORDER — LORAZEPAM 2 MG/ML
1 INJECTION INTRAMUSCULAR
Status: DISCONTINUED | OUTPATIENT
Start: 2024-10-24 | End: 2024-11-05 | Stop reason: HOSPADM

## 2024-10-24 RX ORDER — POLYETHYLENE GLYCOL 3350 17 G/17G
17 POWDER, FOR SOLUTION ORAL DAILY PRN
Status: DISCONTINUED | OUTPATIENT
Start: 2024-10-24 | End: 2024-11-05 | Stop reason: HOSPADM

## 2024-10-24 RX ORDER — MAGNESIUM HYDROXIDE/ALUMINUM HYDROXICE/SIMETHICONE 120; 1200; 1200 MG/30ML; MG/30ML; MG/30ML
30 SUSPENSION ORAL EVERY 4 HOURS PRN
Status: CANCELLED | OUTPATIENT
Start: 2024-10-24

## 2024-10-24 RX ORDER — BENZTROPINE MESYLATE 1 MG/1
1 TABLET ORAL
Status: CANCELLED | OUTPATIENT
Start: 2024-10-24

## 2024-10-24 RX ORDER — HALOPERIDOL 5 MG/ML
5 INJECTION INTRAMUSCULAR
Status: CANCELLED | OUTPATIENT
Start: 2024-10-24

## 2024-10-24 RX ADMIN — LORATADINE 10 MG: 10 TABLET ORAL at 08:15

## 2024-10-24 RX ADMIN — TOPIRAMATE 150 MG: 100 TABLET, FILM COATED ORAL at 08:15

## 2024-10-24 RX ADMIN — Medication 5000 UNITS: at 08:15

## 2024-10-24 RX ADMIN — GABAPENTIN 300 MG: 300 CAPSULE ORAL at 08:15

## 2024-10-24 RX ADMIN — METFORMIN HYDROCHLORIDE 500 MG: 500 TABLET ORAL at 08:16

## 2024-10-24 RX ADMIN — LEVOTHYROXINE SODIUM 112 MCG: 112 TABLET ORAL at 08:16

## 2024-10-24 RX ADMIN — CELECOXIB 200 MG: 100 CAPSULE ORAL at 08:16

## 2024-10-24 RX ADMIN — FLUVOXAMINE MALEATE 50 MG: 50 TABLET ORAL at 08:17

## 2024-10-24 RX ADMIN — MULTIPLE VITAMINS W/ MINERALS TAB 1 TABLET: TAB ORAL at 08:15

## 2024-10-24 RX ADMIN — ACETAZOLAMIDE 500 MG: 250 TABLET ORAL at 08:15

## 2024-10-24 RX ADMIN — LUBIPROSTONE 8 MCG: 8 CAPSULE, GELATIN COATED ORAL at 08:16

## 2024-10-24 RX ADMIN — DULOXETINE HYDROCHLORIDE 60 MG: 30 CAPSULE, DELAYED RELEASE ORAL at 08:15

## 2024-10-24 RX ADMIN — PANTOPRAZOLE SODIUM 20 MG: 20 TABLET, DELAYED RELEASE ORAL at 07:15

## 2024-10-24 RX ADMIN — TOPIRAMATE 150 MG: 25 TABLET, FILM COATED ORAL at 18:28

## 2024-10-24 RX ADMIN — SPIRONOLACTONE 25 MG: 25 TABLET ORAL at 08:17

## 2024-10-24 NOTE — ED CARE HANDOFF
Emergency Department Sign Out Note        Sign out and transfer of care from Dr Parker. See Separate Emergency Department note.     The patient, Gita Rendon, was evaluated by the previous provider for depression.    Workup Completed:  Labs, urinalysis, pregnancy test, screening physical exam, medication management  Medically cleared for crisis eval and inpatient behavioral health    ED Course / Workup Pending (followup):                                       Procedures  Medical Decision Making  Amount and/or Complexity of Data Reviewed  Labs: ordered.    Risk  OTC drugs.  Prescription drug management.            Disposition  Final diagnoses:   Depression with suicidal ideation     Time reflects when diagnosis was documented in both MDM as applicable and the Disposition within this note       Time User Action Codes Description Comment    10/23/2024  9:31 PM Krunal Palmer Add [F32.A,  R45.851] Depression with suicidal ideation           ED Disposition       None          Follow-up Information    None       Patient's Medications   Discharge Prescriptions    No medications on file     No discharge procedures on file.       ED Provider  Electronically Signed by     Piyush Butcher DO  10/24/24 0421
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
## 2024-10-24 NOTE — ED NOTES
Patient is accepted at [facility] .  Patient is accepted by Dr. james     Transportation is arranged with [facility].     Transportation is scheduled for 1500   Patient may go to the floor before 1700 after 2000        Nurse report is to be called to [phone] prior to patient transfer.

## 2024-10-24 NOTE — ED NOTES
Insurance Authorization for admission:   Phone call placed to **.  Phone number: **.     Spoke to **.     ** days approved.  Level of care: **.  Review on **.   Authorization # **.        Eligibility Verification System checked - ([phone]).  Online system / automated system indicates: MA in [facility]

## 2024-10-24 NOTE — EMTALA/ACUTE CARE TRANSFER
[facility] EMERGENCY DEPARTMENT  [address] [address]  Dept: [phone]      EMTALA TRANSFER CONSENT    NAME Gita Rendon                                         1985                              MRN [id number]    I have been informed of my rights regarding examination, treatment, and transfer   by Dr. Piysuh Butcher DO    Benefits:  Specialties/interventions not available at this facility (inpatient behavioral health, inpatient psychiatry)    Risks:  Risk of injury, decompensated illness or delays in care associate with transport/transport times      Consent for Transfer:  I acknowledge that my medical condition has been evaluated and explained to me by the emergency department physician or other qualified medical person and/or my attending physician, who has recommended that I be transferred to the service of    at  . The above potential benefits of such transfer, the potential risks associated with such transfer, and the probable risks of not being transferred have been explained to me, and I fully understand them.  The doctor has explained that, in my case, the benefits of transfer outweigh the risks.  I agree to be transferred.    I authorize the performance of emergency medical procedures and treatments upon me in both transit and upon arrival at the receiving facility.  Additionally, I authorize the release of any and all medical records to the receiving facility and request they be transported with me, if possible.  I understand that the safest mode of transportation during a medical emergency is an ambulance and that the Hospital advocates the use of this mode of transport. Risks of traveling to the receiving facility by car, including absence of medical control, life sustaining equipment, such as oxygen, and medical personnel has been explained to me and I fully understand them.    (NANCY CORRECT BOX BELOW)  [ x ]  I consent to the stated transfer and  to be transported by ambulance/helicopter.  [  ]  I consent to the stated transfer, but refuse transportation by ambulance and accept full responsibility for my transportation by car.  I understand the risks of non-ambulance transfers and I exonerate the Hospital and its staff from any deterioration in my condition that results from this refusal.    X___________________________________________    DATE  10/24/24  TIME________  Signature of patient or legally responsible individual signing on patient behalf           RELATIONSHIP TO PATIENT_________________________          Provider Certification    NAME Gita Rendon                                         1985                              MRN [id number]    A medical screening exam was performed on the above named patient.  Based on the examination:    Condition Necessitating Transfer The primary encounter diagnosis was Depression with suicidal ideation. A diagnosis of Evaluation by medical service required was also pertinent to this visit.    Patient Condition:  Stable    Reason for Transfer:  Inpatient behavioral health, inpatient psychiatry    Transfer Requirements: Facility   [facility]  Space available and qualified personnel available for treatment as acknowledged by  PACS/Crisis  Agreed to accept transfer and to provide appropriate medical treatment as acknowledged by Dr Leonard          Appropriate medical records of the examination and treatment of the patient are provided at the time of transfer   STAFF INITIAL WHEN COMPLETED _______  Transfer will be performed by qualified personnel from    and appropriate transfer equipment as required, including the use of necessary and appropriate life support measures.    Provider Certification: I have examined the patient and explained the following risks and benefits of being transferred/refusing transfer to the patient/family:         Based on these reasonable risks and benefits to the patient and/or the  unborn child(kendall), and based upon the information available at the time of the patient’s examination, I certify that the medical benefits reasonably to be expected from the provision of appropriate medical treatments at another medical facility outweigh the increasing risks, if any, to the individual’s medical condition, and in the case of labor to the unborn child, from effecting the transfer.    X____________________________________________ DATE 10/24/24        TIME_______      ORIGINAL - SEND TO MEDICAL RECORDS   COPY - SEND WITH PATIENT DURING TRANSFER
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
## 2024-10-24 NOTE — NURSING NOTE
Patient arrived to the unit from [facility] at 1539 today. Patient came to the ER due to having migraines and becoming severely depressed. Patient made statement to therapist that she had SI to OD on her Propanolol at home. Patient stated that her housing situation isn't the best and that she has no support system and feels alone. Patient stated she does live with her ex girlfriend and that she is now engaged to someone else. Patient had a lot of losses's lately in her family. Patient was cooperative during admission and stated that she feels safe on the unit. Patient has no SI while she is here and likes to be around people. Patient stated she only gets SI thoughts when she is alone. Patient appears flat and depressed. Patient endorsed moderate anxiety and severe depression and just wants to get help. Patient also has a decrease in appetite due to being depressed all of the time and finds it hard to complete everyday tasks. Patient was given water and ate dinner in the dining room. Patient is social with peers. Continuous safety rounding in place.

## 2024-10-25 LAB
25(OH)D3 SERPL-MCNC: 57.1 NG/ML (ref 30–100)
CHOLEST SERPL-MCNC: 149 MG/DL
EST. AVERAGE GLUCOSE BLD GHB EST-MCNC: 108 MG/DL
HBA1C MFR BLD: 5.4 %
HDLC SERPL-MCNC: 49 MG/DL
LDLC SERPL CALC-MCNC: 84 MG/DL (ref 0–100)
NONHDLC SERPL-MCNC: 100 MG/DL
TRIGL SERPL-MCNC: 78 MG/DL
TSH SERPL DL<=0.05 MIU/L-ACNC: 2.34 UIU/ML (ref 0.45–4.5)
VIT B12 SERPL-MCNC: 394 PG/ML (ref 180–914)

## 2024-10-25 PROCEDURE — 84443 ASSAY THYROID STIM HORMONE: CPT | Performed by: STUDENT IN AN ORGANIZED HEALTH CARE EDUCATION/TRAINING PROGRAM

## 2024-10-25 PROCEDURE — 82306 VITAMIN D 25 HYDROXY: CPT | Performed by: STUDENT IN AN ORGANIZED HEALTH CARE EDUCATION/TRAINING PROGRAM

## 2024-10-25 PROCEDURE — 83036 HEMOGLOBIN GLYCOSYLATED A1C: CPT | Performed by: STUDENT IN AN ORGANIZED HEALTH CARE EDUCATION/TRAINING PROGRAM

## 2024-10-25 PROCEDURE — 80061 LIPID PANEL: CPT | Performed by: STUDENT IN AN ORGANIZED HEALTH CARE EDUCATION/TRAINING PROGRAM

## 2024-10-25 PROCEDURE — 82607 VITAMIN B-12: CPT | Performed by: STUDENT IN AN ORGANIZED HEALTH CARE EDUCATION/TRAINING PROGRAM

## 2024-10-25 RX ORDER — LEVOTHYROXINE SODIUM 112 UG/1
112 TABLET ORAL
Status: DISCONTINUED | OUTPATIENT
Start: 2024-10-25 | End: 2024-11-05 | Stop reason: HOSPADM

## 2024-10-25 RX ORDER — LUBIPROSTONE 8 UG/1
8 CAPSULE ORAL 2 TIMES DAILY
Status: DISCONTINUED | OUTPATIENT
Start: 2024-10-25 | End: 2024-10-27

## 2024-10-25 RX ORDER — FLUVOXAMINE MALEATE 50 MG
50 TABLET ORAL
Status: DISCONTINUED | OUTPATIENT
Start: 2024-10-25 | End: 2024-10-30

## 2024-10-25 RX ORDER — LORATADINE 10 MG/1
10 TABLET ORAL DAILY
Status: DISCONTINUED | OUTPATIENT
Start: 2024-10-25 | End: 2024-11-05 | Stop reason: HOSPADM

## 2024-10-25 RX ORDER — PANTOPRAZOLE SODIUM 40 MG/1
40 TABLET, DELAYED RELEASE ORAL 2 TIMES DAILY
Status: DISCONTINUED | OUTPATIENT
Start: 2024-10-25 | End: 2024-11-05 | Stop reason: HOSPADM

## 2024-10-25 RX ORDER — SPIRONOLACTONE 25 MG/1
25 TABLET ORAL DAILY
Status: DISCONTINUED | OUTPATIENT
Start: 2024-10-25 | End: 2024-11-01

## 2024-10-25 RX ORDER — ACETAZOLAMIDE 250 MG/1
750 TABLET ORAL 2 TIMES DAILY
Status: DISCONTINUED | OUTPATIENT
Start: 2024-10-25 | End: 2024-10-25

## 2024-10-25 RX ORDER — OXYBUTYNIN CHLORIDE 5 MG/1
10 TABLET, EXTENDED RELEASE ORAL DAILY
Status: DISCONTINUED | OUTPATIENT
Start: 2024-10-25 | End: 2024-11-05 | Stop reason: HOSPADM

## 2024-10-25 RX ORDER — GABAPENTIN 600 MG/1
900 TABLET ORAL
Status: DISCONTINUED | OUTPATIENT
Start: 2024-10-25 | End: 2024-11-05 | Stop reason: HOSPADM

## 2024-10-25 RX ORDER — DOCUSATE SODIUM 100 MG/1
100 CAPSULE, LIQUID FILLED ORAL 2 TIMES DAILY
Status: DISCONTINUED | OUTPATIENT
Start: 2024-10-25 | End: 2024-10-30

## 2024-10-25 RX ORDER — ACETAZOLAMIDE 250 MG/1
500 TABLET ORAL 2 TIMES DAILY
Status: DISCONTINUED | OUTPATIENT
Start: 2024-10-25 | End: 2024-11-05 | Stop reason: HOSPADM

## 2024-10-25 RX ORDER — FOLIC ACID 1 MG/1
1 TABLET ORAL DAILY
Status: DISCONTINUED | OUTPATIENT
Start: 2024-10-25 | End: 2024-11-01

## 2024-10-25 RX ORDER — CELECOXIB 100 MG/1
200 CAPSULE ORAL 2 TIMES DAILY
Status: DISCONTINUED | OUTPATIENT
Start: 2024-10-25 | End: 2024-11-05 | Stop reason: HOSPADM

## 2024-10-25 RX ORDER — GABAPENTIN 300 MG/1
300 CAPSULE ORAL
Status: DISCONTINUED | OUTPATIENT
Start: 2024-10-26 | End: 2024-11-05 | Stop reason: HOSPADM

## 2024-10-25 RX ORDER — DULOXETIN HYDROCHLORIDE 60 MG/1
60 CAPSULE, DELAYED RELEASE ORAL 2 TIMES DAILY
Status: DISCONTINUED | OUTPATIENT
Start: 2024-10-25 | End: 2024-10-30

## 2024-10-25 RX ADMIN — DULOXETINE HYDROCHLORIDE 60 MG: 60 CAPSULE, DELAYED RELEASE ORAL at 21:34

## 2024-10-25 RX ADMIN — LUBIPROSTONE 8 MCG: 8 CAPSULE, GELATIN COATED ORAL at 21:41

## 2024-10-25 RX ADMIN — FOLIC ACID 1 MG: 1 TABLET ORAL at 13:04

## 2024-10-25 RX ADMIN — GABAPENTIN 900 MG: 600 TABLET, FILM COATED ORAL at 21:34

## 2024-10-25 RX ADMIN — TOPIRAMATE 150 MG: 25 TABLET, FILM COATED ORAL at 21:34

## 2024-10-25 RX ADMIN — DOCUSATE SODIUM 100 MG: 100 CAPSULE, LIQUID FILLED ORAL at 11:51

## 2024-10-25 RX ADMIN — LORATADINE 10 MG: 10 TABLET ORAL at 14:45

## 2024-10-25 RX ADMIN — ACETAZOLAMIDE 500 MG: 250 TABLET ORAL at 21:34

## 2024-10-25 RX ADMIN — HYDROXYZINE HYDROCHLORIDE 100 MG: 50 TABLET, FILM COATED ORAL at 14:45

## 2024-10-25 RX ADMIN — CARIPRAZINE 6 MG: 3 CAPSULE, GELATIN COATED ORAL at 11:51

## 2024-10-25 RX ADMIN — CELECOXIB 200 MG: 100 CAPSULE ORAL at 13:03

## 2024-10-25 RX ADMIN — PANTOPRAZOLE SODIUM 40 MG: 40 TABLET, DELAYED RELEASE ORAL at 21:34

## 2024-10-25 RX ADMIN — TOPIRAMATE 150 MG: 25 TABLET, FILM COATED ORAL at 08:46

## 2024-10-25 RX ADMIN — DOCUSATE SODIUM 100 MG: 100 CAPSULE, LIQUID FILLED ORAL at 21:34

## 2024-10-25 RX ADMIN — CELECOXIB 200 MG: 100 CAPSULE ORAL at 21:34

## 2024-10-25 RX ADMIN — FLUVOXAMINE MALEATE 50 MG: 50 TABLET ORAL at 21:34

## 2024-10-25 RX ADMIN — DULOXETINE HYDROCHLORIDE 60 MG: 60 CAPSULE, DELAYED RELEASE ORAL at 11:51

## 2024-10-25 RX ADMIN — SPIRONOLACTONE 25 MG: 25 TABLET ORAL at 13:04

## 2024-10-25 RX ADMIN — LUBIPROSTONE 8 MCG: 8 CAPSULE, GELATIN COATED ORAL at 14:45

## 2024-10-25 RX ADMIN — B-COMPLEX W/ C & FOLIC ACID TAB 1 TABLET: TAB at 13:03

## 2024-10-25 RX ADMIN — Medication 5000 UNITS: at 13:03

## 2024-10-25 RX ADMIN — LEVOTHYROXINE SODIUM 112 MCG: 112 TABLET ORAL at 13:03

## 2024-10-25 RX ADMIN — RIMEGEPANT SULFATE 75 MG: 75 TABLET, ORALLY DISINTEGRATING ORAL at 14:45

## 2024-10-25 RX ADMIN — OXYBUTYNIN CHLORIDE 10 MG: 5 TABLET, EXTENDED RELEASE ORAL at 14:45

## 2024-10-25 NOTE — H&P
Nata Rendon#  :1985 F  MRN:[id number]    CSN:[id number]  Adm Date: 10/24/2024 1539  3:39 PM   ATT PHY: Sia Alaniz Md  [facility]         Chief Complaint: suicidal ideation       History of Presenting Illness: Gita Rendon is a(n) 39 y.o. year old female who is admitted to [facility] on 201 voluntary commitment basis.  Patient originally presented to [facility] ED on 10/23 with suicidal ideation.     Patient examined at bedside.  Patient denies any acute physical symptoms.  Discussed all medication and medical conditions with patient.  History of migraines with recent Ketamine treatments which patient feels is making headaches worse.     PTA labs normal.       Allergies   Allergen Reactions    Doxycycline      Pt refuses d/t remote H/O intracranial hypertension     Other Other (See Comments)     Seasonal allergies       Current Facility-Administered Medications on File Prior to Encounter   Medication Dose Route Frequency Provider Last Rate Last Admin    [DISCONTINUED] acetaZOLAMIDE (DIAMOX) tablet 500 mg  500 mg Oral BID Krunal T Ritz, DO   500 mg at 10/24/24 0815    [DISCONTINUED] celecoxib (CeleBREX) capsule 200 mg  200 mg Oral BID Krunal T Ritz, DO   200 mg at 10/24/24 0816    [DISCONTINUED] Cholecalciferol (VITAMIN D3) tablet 5,000 Units  5,000 Units Oral Daily Krunal T Ritz, DO   5,000 Units at 10/24/24 0815    [DISCONTINUED] cycloSPORINE (PF) 0.09 % SOLN 1 drop  1 drop Ophthalmic BID Krunal T Ritz, DO        [DISCONTINUED] DULoxetine (CYMBALTA) delayed release capsule 60 mg  60 mg Oral Daily Krunal T Ritz, DO   60 mg at 10/24/24 0815    [DISCONTINUED] famotidine (PEPCID) tablet 20 mg  20 mg Oral BID Krunal T Ritz, DO        [DISCONTINUED] fluvoxaMINE (LUVOX) tablet 50 mg  50 mg Oral Daily Krunal T Ritz, DO   50 mg at 10/24/24 0817    [DISCONTINUED] gabapentin (NEURONTIN) capsule 300 mg  300 mg Oral After  Breakfast Krunal T Ritz, DO   300 mg at 10/24/24 0815    [DISCONTINUED] gabapentin (NEURONTIN) capsule 900 mg  900 mg Oral HS Krunal T Ritz, DO   900 mg at 10/23/24 2351    [DISCONTINUED] ipratropium (ATROVENT) 0.06 % nasal spray 2 spray  2 spray Each Nare Q12H Krunal T Ritz, DO        [DISCONTINUED] levothyroxine tablet 112 mcg  112 mcg Oral Early Morning Krunal T Ritz, DO   112 mcg at 10/24/24 0816    [DISCONTINUED] loratadine (CLARITIN) tablet 10 mg  10 mg Oral Daily Krunal T Ritz, DO   10 mg at 10/24/24 0815    [DISCONTINUED] lubiprostone (AMITIZA) capsule 8 mcg  8 mcg Oral BID Krunal T Ritz, DO   8 mcg at 10/24/24 0816    [DISCONTINUED] metFORMIN (GLUCOPHAGE) tablet 500 mg  500 mg Oral Daily With Breakfast Krunal T Ritz, DO   500 mg at 10/24/24 0816    [DISCONTINUED] Mirabegron ER TB24 50 mg  50 mg Oral Daily Krunal T Ritz, DO        [DISCONTINUED] multivitamin-minerals (CENTRUM) tablet 1 tablet  1 tablet Oral Daily Krunal T Ritz, DO   1 tablet at 10/24/24 0815    [DISCONTINUED] ondansetron (ZOFRAN-ODT) dispersible tablet 4 mg  4 mg Oral Q8H PRN Krunal T Ritz, DO   4 mg at 10/23/24 2351    [DISCONTINUED] pantoprazole (PROTONIX) EC tablet 20 mg  20 mg Oral Early Morning Krunal T Ritz, DO   20 mg at 10/24/24 0715    [DISCONTINUED] prochlorperazine (COMPAZINE) tablet 5 mg  5 mg Oral Q6H PRN Krunal T Ritz, DO        [DISCONTINUED] rimegepant sulfate (NURTEC) disintegrating tablet 75 mg  75 mg Oral Daily PRN Krunal T Ritz, DO        [DISCONTINUED] spironolactone (ALDACTONE) tablet 25 mg  25 mg Oral Daily Krunal T Ritz, DO   25 mg at 10/24/24 0817    [DISCONTINUED] topiramate (TOPAMAX) tablet 150 mg  150 mg Oral BID Krunal T Ritz, DO   150 mg at 10/24/24 0815     Current Outpatient Medications on File Prior to Encounter   Medication Sig Dispense Refill    acetaZOLAMIDE (DIAMOX) 250 mg tablet take 3 tablets by mouth twice a day (Patient taking differently: Take 250 mg by mouth 2 (two) times a day 2  tablets two times a day) 540 tablet 0    Ajovy 225 MG/1.5ML auto-injector Inject 225 mg under the skin every 30 (thirty) days Last  weeks  not  sure  what day      Biotin 5000 MCG CAPS Take 5,000 mcg by mouth 2 (two) times a day      cariprazine (Vraylar) 6 MG capsule Take 6 mg by mouth daily      celecoxib (CeleBREX) 200 mg capsule Take 1 capsule (200 mg total) by mouth 2 (two) times a day 60 capsule 0    Cequa 0.09 % SOLN Apply 1 drop to eye 2 (two) times a day 60 each 0    cetirizine (ZyrTEC) 10 MG chewable tablet Chew 10 mg daily      cholecalciferol 1,000 units tablet Take 5 tablets (5,000 Units total) by mouth daily 150 tablet 0    Collagen-Vitamin C-Biotin (Collagen 1500/C) 500-50-0.8 MG CAPS Take 500 mcg by mouth 2 (two) times a day 1 Tab in the morning    2 Tabs  in evening      docusate sodium (COLACE) 100 mg capsule Take 100 mg by mouth 2 (two) times a day      DULoxetine (CYMBALTA) 60 mg delayed release capsule Take 1 capsule (60 mg total) by mouth daily 30 capsule 1    fluvoxaMINE (LUVOX) 50 mg tablet Take 50 mg by mouth daily      folic acid (FOLVITE) 1 mg tablet Take 1 mg by mouth daily      gabapentin (NEURONTIN) 300 mg capsule Take 1 capsule (300 mg total) by mouth daily after breakfast (Patient taking differently: Take 300 mg by mouth daily after breakfast Morning)      gabapentin (NEURONTIN) 600 MG tablet Take 1.5 tablets (900 mg total) by mouth daily at bedtime 45 tablet 0    levothyroxine 112 mcg tablet Take 1 tablet (112 mcg total) by mouth daily in the early morning 90 tablet 1    linaCLOtide (Linzess) 290 MCG CAPS Take 1 capsule by mouth daily 90 capsule 0    metFORMIN (GLUCOPHAGE) 500 mg tablet Take 1 tablet (500 mg total) by mouth daily with breakfast 90 tablet 1    Mirabegron ER 50 MG TB24 Take 1 tablet (50 mg total) by mouth in the morning 30 tablet 0    Multiple Vitamin (MULTIVITAMIN) capsule Take 1 capsule by mouth daily      Nurtec 75 MG TBDP Take 75 mg by mouth every other day       omeprazole (PriLOSEC) 20 mg delayed release capsule Take 1 capsule (20 mg total) by mouth 2 (two) times a day 180 capsule 1    Semaglutide-Weight Management (WEGOVY) 1.7 MG/0.75ML Inject 0.75 mL (1.7 mg total) under the skin once a week 3 mL 3    famotidine (PEPCID) 20 mg tablet Take 1 tablet (20 mg total) by mouth 2 (two) times a day (Patient not taking: Reported on 10/24/2024) 30 tablet 0    hydrocortisone (ANUSOL-HC) 2.5 % rectal cream Apply topically 2 (two) times a day (Patient not taking: Reported on 10/25/2024) 30 g 5    ipratropium (ATROVENT) 0.03 % nasal spray 2 sprays into each nostril every 12 (twelve) hours (Patient not taking: Reported on 10/24/2024) 30 mL 1    ondansetron (ZOFRAN) 4 mg tablet Take 1 tablet (4 mg total) by mouth every 6 (six) hours (Patient not taking: Reported on 10/24/2024) 20 tablet 0    prochlorperazine (COMPAZINE) 10 mg tablet Take 10 mg by mouth every 6 (six) hours as needed for nausea or vomiting Take 1 tablet by TID if needed for headache or nausea. Limit to 2x a week . (Patient not taking: Reported on 10/24/2024)      spironolactone (ALDACTONE) 25 mg tablet Take 1 tablet (25 mg total) by mouth daily 30 tablet 5    sucralfate (CARAFATE) 1 g tablet Take 1 tablet (1 g total) by mouth 3 (three) times a day for 10 days 30 tablet 0    topiramate (TOPAMAX) 100 mg tablet Take 150 mg by mouth 2 (two) times a day TAKE 1.5 TABLETS BID         Active Ambulatory Problems     Diagnosis Date Noted    Obsessive-compulsive disorder 01/24/2018    Hypertension 01/24/2018    Hypothyroidism 01/24/2018    Overactive bladder 01/24/2018    Binge-eating disorder, severe 09/09/2016    Gambling disorder, moderate 06/15/2015    Dysfunction of both eustachian tubes 05/20/2018    Bulging lumbar disc 05/08/2014    Esophageal reflux 08/15/2013    EILEEN (generalized anxiety disorder) 11/18/2015    Lumbar degenerative disc disease 05/08/2014    Morbid obesity (HCC) 09/25/2014    Nocturnal enuresis 05/16/2017     PTSD (post-traumatic stress disorder) 10/12/2017    Urgency incontinence 07/06/2017    Vitamin D deficiency 01/15/2014    Chronic midline low back pain without sciatica 07/17/2018    Iliotibial band syndrome of left side 09/04/2018    Piriformis syndrome of left side 09/04/2018    Skin lesion 09/06/2018    Prediabetes 11/29/2018    Family history of cancer 01/22/2019    Intracranial hypertension 02/28/2019    Chronic migraine without aura without status migrainosus, not intractable 03/04/2019    Chronic tension-type headache, not intractable 03/04/2019    Menorrhagia with irregular cycle 12/30/2019    Encounter for insertion of Mirena IUD 02/10/2020    CAIO (obstructive sleep apnea)     IUD check up 03/06/2020    Cervicalgia 01/06/2021    Numbness and tingling in both hands 09/24/2021    Dermatitis 10/16/2021    Chronic constipation 10/16/2021    Thyroid disease neuropathy (HCC) 03/19/2022    Encounter for medication monitoring 07/06/2022    Hypervolemia 07/06/2022    Migraine headache 08/22/2022    Hyperaldosteronism (HCC) 09/16/2022    Persistent proteinuria 01/05/2023    Xerophthalmia 09/21/2023    Xerostomia 09/21/2023    Bilateral lower extremity edema 11/10/2023    Severe episode of recurrent major depressive disorder, without psychotic features ([facility]) 07/05/2024    Medical clearance for psychiatric admission 07/05/2024    Grief 07/16/2024    Major depressive disorder, recurrent, severe without psychotic features ([facility]) 07/16/2024     Resolved Ambulatory Problems     Diagnosis Date Noted    Bipolar I disorder, most recent episode depressed, severe without psychotic features ([facility]) 01/09/2018    Allergic rhinitis 01/24/2018    Encntr for gyn exam (general) (routine) w abnormal findings 02/26/2018    Vaginal relaxation 02/26/2018    Routine screening for STI (sexually transmitted infection) 02/26/2018    BMI 50.0-59.9, adult (HCC) 02/26/2018    Amenorrhea 05/17/2012    RLS (restless legs syndrome) 07/21/2016     Irregular bleeding 08/02/2018    Multiple nevi 09/06/2018    Recurrent sinusitis 11/29/2018    Breast nodule 12/04/2018    Chronic headache 02/28/2019    Acute headache 02/28/2019    Visual field defect 02/28/2019    Encntr for gyn exam (general) (routine) w/o abn findings 03/06/2020    Class 3 severe obesity with body mass index (BMI) of 60.0 to 69.9 in adult (HCC) 03/06/2020    Volume overload 10/16/2021    Chronic ethmoidal sinusitis 03/14/2023    Chronic maxillary sinusitis 03/14/2023    Depression 08/07/2023     Past Medical History:   Diagnosis Date    Allergic     Anorexia nervosa in remission     Anxiety     Arthritis 5/8/2014    Back pain     Bipolar disorder ([facility])     Disease of thyroid gland     Diverticulitis of colon 9/20/2015    Dizziness     Ear problems     Eating disorder     GERD (gastroesophageal reflux disease) 8/15/2013    Headache(784.0)     Headache, tension-type     Hypothyroid     Idiopathic intracranial hypertension     Migraine     Nasal congestion     Nosebleed     Obesity     Otitis media     Psychiatric disorder     Psychiatric illness     Restless leg syndrome, controlled     Seasonal allergies     Sinusitis     Sleep apnea     Sleep difficulties     Suicide and self-inflicted injury ([facility])     Tinnitus     TMJ dysfunction     Tonsillitis     Transcranial Magnetic Stimulation 09/06/2021    Urinary tract infection     Vision loss        Past Surgical History:   Procedure Laterality Date    DENTAL SURGERY      wisdom teeth-at [age] years. Other surgery dental extraxtion of bone spur (10 years ago)    IR LUMBAR PUNCTURE  02/26/2019    SINUS ENDOSCOPY      SINUS SURGERY      TONSILLECTOMY         Social History:   Social History     Socioeconomic History    Marital status: Single     Spouse name: Not on file    Number of children: Not on file    Years of education: Not on file    Highest education level: Not on file   Occupational History    Occupation: DISABLED   Tobacco Use    Smoking  status: Never     Passive exposure: Never    Smokeless tobacco: Never   Vaping Use    Vaping status: Never Used   Substance and Sexual Activity    Alcohol use: Never    Drug use: Never    Sexual activity: Not Currently     Partners: Female     Comment: No STDs   Other Topics Concern    Not on file   Social History Narrative    Always uses seat belts    Caffeine use     Disabled - permament disability since  due to psychiatric illness    Has no children    Single     Tea     Social Determinants of Health     Financial Resource Strain: Low Risk  (2024)    Overall Financial Resource Strain (CARDIA)     Difficulty of Paying Living Expenses: Not very hard   Food Insecurity: Food Insecurity Present (10/24/2024)    Nursing - Inadequate Food Risk Classification     Worried About Running Out of Food in the Last Year: Never true     Ran Out of Food in the Last Year: Never true     Ran Out of Food in the Last Year: 2   Transportation Needs: No Transportation Needs (10/24/2024)    Nursing - Transportation Risk Classification     Lack of Transportation: Not on file     Lack of Transportation: 2   Physical Activity: Not on file   Stress: Not on file   Social Connections: Socially Integrated (10/30/2023)    Received from [employer]    Social Tiller     How often do you feel lonely or isolated from those around you?: Never   Intimate Partner Violence: Unknown (10/24/2024)    Nursing IPS     Feels Physically and Emotionally Safe: Not on file     Physically Hurt by Someone: Not on file     Humiliated or Emotionally Abused by Someone: Not on file     Physically Hurt by Someone: 2     Hurt or Threatened by Someone: 2   Housing Stability: Unknown (10/24/2024)    Nursing: Inadequate Housing Risk Classification     Has Housing: Not on file     Worried About Losing Housing: Not on file     Unable to Get Utilities: Not on file     Unable to Pay for Housing in the Last Year: 2     Has Housin       Family History:   Family  "History   Problem Relation Age of Onset    Mental illness Mother     Depression Mother     Suicide Attempts Mother     Hypertension Mother     Diabetes Mother     Other Mother         bicuspid aortic valve    Anxiety disorder Mother     Psychiatric Illness Mother     Schizoaffective Disorder  Mother     Anemia Mother     Hypertension Father     Hypothyroidism Father     Thyroid disease Father     Hypertension Brother     Cancer Maternal Grandmother     Heart disease Maternal Grandmother     Stroke Maternal Grandmother     Breast cancer Maternal Grandmother     Skin cancer Maternal Grandmother     Arthritis Maternal Grandmother     Pneumonia Maternal Grandfather     Cancer Maternal Grandfather     Dementia Maternal Grandfather     COPD Maternal Grandfather     Cancer Paternal Grandmother     Pneumonia Paternal Grandfather     Arthritis Family     Breast cancer Family     Osteopenia Family     Osteoporosis Family     Hypertension Family     Transient ischemic attack Family        Review of Systems   Constitutional:  Negative for chills and fever.   HENT: Negative.     Eyes: Negative.    Respiratory:  Negative for shortness of breath.    Cardiovascular:  Negative for chest pain, palpitations and leg swelling.   Gastrointestinal:  Negative for abdominal pain, constipation, diarrhea and nausea.   Genitourinary: Negative.    Musculoskeletal:  Negative for arthralgias.   Skin: Negative.    Neurological:  Positive for headaches. Negative for light-headedness.   Psychiatric/Behavioral: Negative.         Physical Exam   Vitals: Blood pressure 135/67, pulse 59, temperature 97.8 °F (36.6 °C), temperature source Temporal, resp. rate 17, height 5' 8\" (1.727 m), weight (!) 146 kg (321 lb 12.8 oz), last menstrual period 10/15/2024, SpO2 99%.,Body mass index is 48.93 kg/m².  Constitutional: Awake, alert, in no acute distress.  Head: Normocephalic and atraumatic.   Mouth/Throat: Oropharynx is clear and moist.    Eyes: Conjunctivae " and EOM are normal.   Neck: Neck supple. No thyromegaly present.   Cardiovascular: Normal rate, regular rhythm and normal heart sounds.    Pulmonary/Chest: Effort normal and breath sounds normal.   Abdominal: Soft. Bowel sounds are normal. There is no tenderness. There is no rebound and no guarding.   Neurological: No focal deficits.   Musculoskeletal:  Nontender spine.  Skin: Skin is warm and dry. No edema.     Assessment     Gita Rendon is a(n) 39 y.o. female with MDD.     Chronic migraines.  Patient on topamax 150 mg twice daily, Nurtec 75 mg every other day, and Ajovy 225 MG/1.5ML auto-injector monthly (last does 1 week ago).  Intracranial HTN.  Continue Diamox 250 mg daily.  Allergic rhinitis.  Patient on Zyrtec 10 mg daily.   Dry eyes.  Patient on Cequa 0.09 % SOLN, will bring from home.   GERD.  Patient on Prilosec 20 mg twice daily.   Hypothyroidism.  Continue levothyroxine 112 mcg daily.   Hyperaldosteronism.  Continue spironolactone 25 mg daily.   Morbid obesity/prediabetes.  Hgb A1c pending.  Continue metformin 500 mg daily and Wegovy 1.7mg/0.75 mL weekly (pt brought from home).   Constipation.  Continue Linzess 290 mcg daily (brought from home) and colace 100 mg twice daily.   Chronic back pain.  Patient is on gabapentin 300 mg AM and 900 mg HS, Celebrex 200 mg twice daily.    Vitamin deficiency.  Continue daily supplements.   MDD.  Managed by psych.       Prognosis: Fair.    Discharge Plan: In progress.    Advanced Directives: I have discussed in detail with the patient the advanced directives. The patient does not have an appointed POA or living will at this time. Emergency contact is brother, Vladimir Rendon, [phone]. When discussing cardiac and pulmonary resuscitation efforts with the patient, the patient wishes to be  FULL CODE.     I have spent more than 50 minutes gathering data, doing physical examination, and discussing the advanced directives, which was witnessed by caring staff.    The  patient was discussed with Dr. Liao and he is in agreement with the above note.
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
## 2024-10-25 NOTE — CASE MANAGEMENT
Cm [facility] [phone] for admission notification. CM left VM message with request for return call for collaboration.    CM placed call to patient's CM at [facility]:[phone] for admission notification. Spoke with  staff who will forward the message to patient's CM, Claire, requesting a return call.    CM placed call to patient's PCP, Dr. Malik Morales: [phone] for admission notification. Spoke with office staff who reported next scheduled appt is 3/25. Will schedule a sooner NANETTE appt with d/c notification.    CM placed call to patient's brother, Vladimir: [phone] for family notification. CM left VM message with request for return call for collaboration.

## 2024-10-25 NOTE — NUTRITION
10/25/24 1512   Biochemical Data,Medical Tests, and Procedures   Biochemical Data/Medical Tests/Procedures Lab values reviewed;Meds reviewed   Labs (Comment) 10/23 Cl:112, BUN:28, WBC:11.91. 10/25 HDL:49   Meds (Comment) cogentin, celebrex, Vit D, luvox, neurontin, haldol, atarax, levothyroxine, metformin, MVI, protonix, risperdal   Nutrition-Focused Physical Exam   Nutrition-Focused Physical Exam Findings RN skin assessment reviewed;No skin issues documented   Medical-Related Concerns Allergic   Patient Reported  Allergic rhinitis   Patient Reported  Anorexia nervosa in remission     Anxiety     Arthritis 5/8/2014  Patient Reported  Back pain     Bipolar disorder (HCC)     Depression     Dermatitis 10/16/2021    Disease of thyroid gland   Patient Reported  Diverticulitis of colon 9/20/2015  Patient Reported  Dizziness   Patient Reported  Ear problems   Patient Reported  Eating disorder   Patient Reported  Esophageal reflux 08/15/2013    GERD (gastroesophageal reflux disease) 8/15/2013  Patient Reported  Headache(784.0)   Patient Reported  Headache, tension-type   Patient Reported  Hypertension     Hypothyroid     Idiopathic intracranial hypertension     Lumbar degenerative disc disease 05/08/2014    Migraine   Patient Reported  Nasal congestion   Patient Reported  Nosebleed   Patient Reported  Obesity     Obsessive-compulsive disorder     Otitis media   Patient Reported  Overactive bladder     Psychiatric disorder     Psychiatric illness     Restless leg syndrome, controlled     Seasonal allergies     Sinusitis   Patient Reported  Sleep apnea   Patient Reported  Sleep difficulties   Patient Reported  Suicide and self-inflicted injury (HCC)     Tinnitus   Patient Reported  TMJ dysfunction   Patient Reported  Tonsillitis   Patient Reported  Transcranial Magnetic Stimulation 09/06/2021    Urinary tract infection   Patient Reported  Vision loss   Adequacy of Intake   Nutrition Modality PO   Feeding Route   PO  Independent   Current PO Intake   Current Diet Order Regular diet thin liquids   Current Meal Intake %   Estimated calorie intake compared to estimated need Nutrient needs met.   PES Statement   Problem Clinical   Weight (3) Overweight/obesity NC-3.3   Overweight/ obesity specific to Obese, Class III (5)   Related to Energy intake>energy output over time   As evidenced by: BMI   Recommendations/Interventions   Malnutrition/BMI Present Yes   Provider already documenting morbid obesity Yes   Adult BMI Classifications Morbid Obesity 45-49.9   Summary High BMI, poor PO. Regular diet thin liquids. Meal completions 100%. Unable to obtain nutrition history at this time. Chart utilized for information. 10/24/#; 9/27/#; 6/17/#; 4/25/#; 10/26/#, 55#(15%) weight loss. No significant weight changes. Skin intact.   Interventions/Recommendations Continue current diet order   Education Assessment   Education Education not indicated at this time   Education Notes Follows with weight management.   Patient Nutrition Goals   Goal Avoid weight gain   Goal Status Initiated   Timeframe to complete goal by next f/u   Nutrition Complexity Risk   Nutrition complexity level Low risk   Follow up date 11/08/24

## 2024-10-25 NOTE — NURSING NOTE
Pt came in with the following belongings:    Remains with Pt:  X1 bar of soap  X1 hair brush  X1 comb   X1 pack of pads  X1 pair of shoes  X3 t-shirts  X1 sweater  X1 pair of shorts  X2 sweatpants  X4 pairs of socks   X1 pair of slides  X4 underwear  X4 sports bras  X1 squeaky toy  X1 toothbrush    Storage:  X1 lunch bag  X1 shampoo  X1 conditioner  X1 body wash  X1 deodorant  X1 toothpaste  X1 opened bag of toothpicks  X1 notebook with pen  X1  with block  X1 red book bag  X1 gray bag  X2 t-shirts  X2 pairs of shorts  X4 underwear  X1 pair of socks    Contraband:  X1 glasses case  X1 pair of glasses  X1 set of keys with lanyard  X1 cellphone    Security:  X1 wallet  X4 $1 bills  X1 EBT card  X2 health insurance cards  X1 PA DL    Medications were given to nurse to put them in the [employer]

## 2024-10-25 NOTE — ASSESSMENT & PLAN NOTE
Patient presenting with increased depression, thoughts of self-harm due to stressors of losses, loneliness and physical issues including headaches from ketamine therapy.  At this time patient will be restarted on her outpatient medication namely Cymbalta 60 mg twice daily, Vraylar 6 mg daily and Luvox 50 mg at bedtime.

## 2024-10-25 NOTE — NURSING NOTE
Patient withdrawn to room this evening, Appears sad and depressed. Endorses mild - moderate anxiety and severe depression. Patient stated she was still experiencing fleeting S/T and felt safe on the unit and promised to seek out staff if she decides to act. No HI or AV/H noted. Medication compliant. Q 15 checks maintained.

## 2024-10-25 NOTE — TREATMENT PLAN
TREATMENT PLAN REVIEW - Behavioral Health Gita Rendon 39 y.o. 1985 female MRN: [id number]    [facility] GH IP BEHAVIORAL HEALTH Room / Bed: [facility] 258/[facility] 258-02 Encounter: [id number]          Admit Date/Time:  10/24/2024  3:39 PM    Treatment Team:   MD Dayo Nunez MD Amber Pendergast Nicole Schmick Paul F Klose, PHYLLIS Weathers RN William Widarsono Christina Ayers Leah Miller    Diagnosis: Active Problems:    EILEEN (generalized anxiety disorder)    Severe episode of recurrent major depressive disorder, without psychotic features (HCC)      Patient Strengths/Assets: average or above intelligence, cooperative, communication skills    Patient Barriers/Limitations: difficulty adapting, lack of social/family support, limited support system, poor insight    Short Term Goals: decrease in depressive symptoms, decrease in anxiety symptoms, ability to stay safe on the unit, improvement in insight    Long Term Goals: improvement in anxiety, resolution of depressive symptoms, stabilization of mood, free of suicidal thoughts, improved insight    Progress Towards Goals: starting psychiatric medications as prescribed    Recommended Treatment: medication management, patient medication education, group therapy, milieu therapy, continued Behavioral Health psychiatric evaluation/assessment process    Treatment Frequency: daily medication monitoring, group and milieu therapy daily, monitoring through interdisciplinary rounds, monitoring through weekly patient care conferences    Expected Discharge Date:  10/25/24    Discharge Plan: referrals as indicated    Treatment Plan Created/Updated By: Sia Alaniz MD

## 2024-10-25 NOTE — PLAN OF CARE
Problem: Ineffective Coping  Goal: Identifies healthy coping skills  Outcome: Progressing     Problem: Risk for Self Injury/Neglect  Goal: Refrain from harming self  Description: Interventions:  - Monitor patient closely, per order  - Develop a trusting relationship  - Supervise medication ingestion, monitor effects and side effects   Outcome: Progressing
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
## 2024-10-25 NOTE — NURSING NOTE
Patient awake in room. Intermittently visible on milieu. Fleeting SI. Agree  to inform staff of  thoughts to hurt self .Pleasant and cooperative.Schedule PO medication administered as ordered.Denies hallucination, HI, SI.  Appetite good. Attend group.Continue on safety check

## 2024-10-25 NOTE — PROGRESS NOTES
Met with patient completed her admission self assessment. She feels isolated due to her health situation. She lives with her ex and feels as if she does not have support. She does have things she is interested and wants to learn coping skills, improve concentration and memory, helping her family understand her struggles and community support.

## 2024-10-25 NOTE — PLAN OF CARE
Problem: Alteration in Thoughts and Perception  Goal: Attend and participate in unit activities, including therapeutic, recreational, and educational groups  Description: Interventions:  -Encourage Visitation and family involvement in care  Outcome: Progressing     Problem: Ineffective Coping  Goal: Participates in unit activities  Description: Interventions:  - Provide therapeutic environment   - Provide required programming   - Redirect inappropriate behaviors   Outcome: Progressing     Problem: Risk for Self Injury/Neglect  Goal: Attend and participate in unit activities, including therapeutic, recreational, and educational groups  Description: Interventions:  - Provide therapeutic and educational activities daily, encourage attendance and participation, and document same in the medical record  - Obtain collateral information, encourage visitation and family involvement in care   Outcome: Progressing     Problem: Depression  Goal: Attend and participate in unit activities, including therapeutic, recreational, and educational groups  Description: Interventions:  - Provide therapeutic and educational activities daily, encourage attendance and participation, and document same in the medical record   Outcome: Progressing   Pt is a new patient who will be encouraged to participate in groups

## 2024-10-25 NOTE — PROGRESS NOTES
10/25/24 6838   Team Meeting   Meeting Type Daily Rounds   Team Members Present   Team Members Present Physician;Occupational Therapist;Nurse;   Physician Team Member MD Gauri Alaniz,FRACISCO   Nursing Team Member ANAHI Tidwell   Social Work Team Member ISA Herrera LSW   OT Team Member PHYLLIS Erwin   Patient/Family Present   Patient Present No   Patient's Family Present No   201, history of IP,  SI w/ plan, bizare, on ketamine for dep, endorses

## 2024-10-26 LAB
ATRIAL RATE: 57 BPM
P AXIS: 35 DEGREES
PR INTERVAL: 194 MS
QRS AXIS: 13 DEGREES
QRSD INTERVAL: 100 MS
QT INTERVAL: 406 MS
QTC INTERVAL: 395 MS
T WAVE AXIS: 21 DEGREES
VENTRICULAR RATE: 57 BPM

## 2024-10-26 PROCEDURE — 93010 ELECTROCARDIOGRAM REPORT: CPT | Performed by: INTERNAL MEDICINE

## 2024-10-26 PROCEDURE — 99232 SBSQ HOSP IP/OBS MODERATE 35: CPT | Performed by: HOSPITALIST

## 2024-10-26 PROCEDURE — 93005 ELECTROCARDIOGRAM TRACING: CPT

## 2024-10-26 RX ADMIN — FLUVOXAMINE MALEATE 50 MG: 50 TABLET ORAL at 21:14

## 2024-10-26 RX ADMIN — LORATADINE 10 MG: 10 TABLET ORAL at 09:10

## 2024-10-26 RX ADMIN — TOPIRAMATE 150 MG: 25 TABLET, FILM COATED ORAL at 09:09

## 2024-10-26 RX ADMIN — TOPIRAMATE 150 MG: 25 TABLET, FILM COATED ORAL at 18:02

## 2024-10-26 RX ADMIN — LEVOTHYROXINE SODIUM 112 MCG: 112 TABLET ORAL at 06:05

## 2024-10-26 RX ADMIN — SPIRONOLACTONE 25 MG: 25 TABLET ORAL at 09:09

## 2024-10-26 RX ADMIN — DULOXETINE HYDROCHLORIDE 60 MG: 60 CAPSULE, DELAYED RELEASE ORAL at 09:10

## 2024-10-26 RX ADMIN — PANTOPRAZOLE SODIUM 40 MG: 40 TABLET, DELAYED RELEASE ORAL at 09:10

## 2024-10-26 RX ADMIN — OXYBUTYNIN CHLORIDE 10 MG: 5 TABLET, EXTENDED RELEASE ORAL at 09:09

## 2024-10-26 RX ADMIN — ACETAZOLAMIDE 500 MG: 250 TABLET ORAL at 09:09

## 2024-10-26 RX ADMIN — GABAPENTIN 300 MG: 300 CAPSULE ORAL at 09:10

## 2024-10-26 RX ADMIN — CELECOXIB 200 MG: 100 CAPSULE ORAL at 18:02

## 2024-10-26 RX ADMIN — PANTOPRAZOLE SODIUM 40 MG: 40 TABLET, DELAYED RELEASE ORAL at 18:02

## 2024-10-26 RX ADMIN — CARIPRAZINE 6 MG: 3 CAPSULE, GELATIN COATED ORAL at 09:09

## 2024-10-26 RX ADMIN — ACETAZOLAMIDE 500 MG: 250 TABLET ORAL at 18:02

## 2024-10-26 RX ADMIN — LUBIPROSTONE 8 MCG: 8 CAPSULE, GELATIN COATED ORAL at 21:14

## 2024-10-26 RX ADMIN — DOCUSATE SODIUM 100 MG: 100 CAPSULE, LIQUID FILLED ORAL at 18:02

## 2024-10-26 RX ADMIN — LUBIPROSTONE 8 MCG: 8 CAPSULE, GELATIN COATED ORAL at 09:09

## 2024-10-26 RX ADMIN — CELECOXIB 200 MG: 100 CAPSULE ORAL at 09:08

## 2024-10-26 RX ADMIN — FOLIC ACID 1 MG: 1 TABLET ORAL at 09:10

## 2024-10-26 RX ADMIN — DULOXETINE HYDROCHLORIDE 60 MG: 60 CAPSULE, DELAYED RELEASE ORAL at 18:02

## 2024-10-26 RX ADMIN — GABAPENTIN 900 MG: 600 TABLET, FILM COATED ORAL at 21:14

## 2024-10-26 RX ADMIN — B-COMPLEX W/ C & FOLIC ACID TAB 1 TABLET: TAB at 09:09

## 2024-10-26 RX ADMIN — DOCUSATE SODIUM 100 MG: 100 CAPSULE, LIQUID FILLED ORAL at 09:10

## 2024-10-26 RX ADMIN — Medication 5000 UNITS: at 09:09

## 2024-10-26 RX ADMIN — METFORMIN HYDROCHLORIDE 500 MG: 500 TABLET ORAL at 09:10

## 2024-10-26 NOTE — ASSESSMENT & PLAN NOTE
Patient presenting with increased depression, thoughts of self-harm due to stressors of losses, loneliness and physical issues including headaches from ketamine therapy.  Upon admission the patient was restarted on her outpatient medication namely Cymbalta 60 mg twice daily, Vraylar 6 mg daily and Luvox 50 mg at bedtime.

## 2024-10-26 NOTE — PROGRESS NOTES
"Progress Note - Gita Rendon 39 y.o. female MRN: [id number]    Unit/Bed#: [facility] [[email or web address]] 258-02 Encounter: [id number]        Subjective:   Patient seen and examined at bedside after reviewing the chart and discussing the case with the caring staff.      Patient examined at bedside.  Patient has no acute symptoms.    Physical Exam   Vitals: Blood pressure 108/66, pulse 60, temperature 97.8 °F (36.6 °C), temperature source Temporal, resp. rate 16, height 5' 8\" (1.727 m), weight (!) 146 kg (321 lb 12.8 oz), last menstrual period 10/15/2024, SpO2 98%.,Body mass index is 48.93 kg/m².  Constitutional: Patient in no acute distress.  HEENT: PERR, EOMI, MMM.  Cardiovascular: Normal rate and regular rhythm.    Pulmonary/Chest: Effort normal and breath sounds normal.   Abdomen: Soft, + BS, NT.    Assessment/Plan:  Gita Rendon is a(n) 39 y.o. female with MDD.      Chronic migraines.  Patient on topamax 150 mg twice daily, Nurtec 75 mg every other day, and Ajovy 225 MG/1.5ML auto-injector monthly (last does 1 week ago).  Intracranial HTN.  Continue Diamox 250 mg daily.  Allergic rhinitis.  Patient on Zyrtec 10 mg daily.   Dry eyes.  Patient on Cequa 0.09 % SOLN, will bring from home.   GERD.  Patient on Prilosec 20 mg twice daily.   Hypothyroidism.  Continue levothyroxine 112 mcg daily.   Hyperaldosteronism.  Continue spironolactone 25 mg daily.   Morbid obesity/prediabetes.  Hgb A1c 5.4% on 10/25/24.  Continue metformin 500 mg daily and Wegovy 1.7mg/0.75 mL weekly (pt brought from home).   Constipation.  Continue Linzess 290 mcg daily (brought from home) and colace 100 mg twice daily.   Chronic back pain.  Patient is on gabapentin 300 mg AM and 900 mg HS, Celebrex 200 mg twice daily.    Vitamin deficiency.  Continue daily supplements.     The patient was discussed with Dr. Liao and he is in agreement with the above note.  "

## 2024-10-26 NOTE — NURSING NOTE
Pt was withdrawn to her room. Compliant with medication. Cooperative with staff during care. Endorsed mild to moderate depression, and anxiety. Denied SI, HI, or AVH. Affect was flat. Eye contact was good. Speech pattern was normals and relaxed. Appearance and hygiene was unremarkable. Made no c/o pain or discomfort. Safety checks continued.

## 2024-10-26 NOTE — NURSING NOTE
Patient visible on unit. Flat affect .Pleasant and cooperative.Withdrawn to self. Schedule PO medication administered as ordered.Report  mild  to moderate depression and  anxiety decline PRN. Fleeing SI  agree  to inform  staff  of thought.Denies hallucination, HI.Attend group.Continue on safety check

## 2024-10-26 NOTE — NURSING NOTE
PT observed sleeping during safety checks.  No distress or labor breathing observed.   No changes in medical condition. No behavior noted.  Will continue to monitor.

## 2024-10-26 NOTE — PROGRESS NOTES
Progress Note - Behavioral Health   Name: Gita Rendon 39 y.o. female I MRN: [id number]  Unit/Bed#: -02 I Date of Admission: 10/24/2024   Date of Service: 10/26/2024 I Hospital Day: 2  Encounter: [id number]        Assessment & Plan  Severe episode of recurrent major depressive disorder, without psychotic features (HCC)  Patient presenting with increased depression, thoughts of self-harm due to stressors of losses, loneliness and physical issues including headaches from ketamine therapy.  Upon admission the patient was restarted on her outpatient medication namely Cymbalta 60 mg twice daily, Vraylar 6 mg daily and Luvox 50 mg at bedtime.  EILEEN (generalized anxiety disorder)  Medications as above      Plan:  All current active meds have been reviewed.  Continue current psychotropic medications.  Medical management per medical team.    Encourage group therapy, milieu therapy and occupational therapy  Behavioral Health checks for safety monitoring    Risks / Benefits of Treatment:  Risks, benefits, and possible side effects of medications explained to patient including risk of parkinsonian symptoms, Tardive Dyskinesia and metabolic syndrome related to treatment with antipsychotic medications, risk of cardiovascular events in elderly related to treatment with antipsychotic medications, and risk of suicidality and serotonin syndrome related to treatment with antidepressants. The patient verbalizes understanding and agreement for treatment.      Subjective:  The patient was evaluated this morning for continuity of care and no acute distress noted throughout the evaluation. Over the past 24 hours staff noted that Gita  managed her home meds to be shipped here by pharmacy . Patient has been medication adherent.    Today on evaluation,Gita endorses depression 8 out of 10 and anxiety as 7 out of 10, says that she often feels ovewhelemed, hopeless and helpless. She patient tends to focus on negative  thoughts, she ruminates about multiple problems she is facing including medical/health issues, her family members passing away during short period of time, about her living situation which became complicated after breaking up with her SO but still living in the same apartment because of financial constraints, giving away her pet because she cannot afford to have it anymore, and other problems.   The patient also thinks that her decompensaton was partially caused by changes in the medications by her outpatient doctor who took her off Welbutrine and replacing it with Paxil.  Gita also reports that recently she started to have nighmares intermittently.  In terms of safety, Gita reports fleeting suicidal thoughts and thoughts of wanting to harm self. Denies homicidal ideation. Gita Denies auditory hallucinations and visual hallucinations. Denies symptoms consistent with delusional thought content. Denies symptoms consistent with celia/hypomania.      Psychiatric Review of Systems:  Behavior over the last 24 hours: unchanged.   Sleep:  hypersomnia  Appetite: normal  Medication side effects: No   ROS: reports intermittent headaches that accompany depression    Mental Status Examination:  Appearance:  casually dressed, marginal hygiene, overweight, wears eyeglasses   Behavior:  pleasant, cooperative, calm, guarded   Speech:  normal rate and volume   Mood:  depressed, dysphoric, anxious   Affect:  blunted   Thought Process:  organized, linear   Associations: intact associations   Thought Content:  no overt delusions   Perceptual Disturbances: does not appear responding to internal stimuli, denies auditory or visual hallucinations when asked   Risk Potential: Suicidal ideation - Yes, fleeting suicidal thoughts  Homicidal ideation - None   Sensorium:  oriented to person, place, and time/date   Memory:  recent and remote memory grossly intact   Consciousness:  alert and awake   Attention/Concentration: attention  span and concentration are age appropriate   Insight:  impaired   Judgment: impaired   Gait/Station: normal gait/station   Motor Activity: no abnormal movements       Vital signs in last 24 hours:  Temp:  [97.5 °F (36.4 °C)-97.8 °F (36.6 °C)] 97.8 °F (36.6 °C)  HR:  [60] 60  BP: (108-149)/(66-76) 108/66  Resp:  [16] 16  SpO2:  [96 %-98 %] 98 %  O2 Device: None (Room air)       Laboratory results: I have personally reviewed all pertinent laboratory/tests results      Progress Toward Goals: attends groups, participates in milieu therapy, working on coping skills    Current Medications:  Current Facility-Administered Medications   Medication Dose Route Frequency Provider Last Rate    acetaminophen  650 mg Oral Q6H PRN Lee Ann Garcia MD      acetaminophen  650 mg Oral Q4H PRN Lee Ann Garcia MD      acetaminophen  975 mg Oral Q6H PRN Lee Ann Garcia MD      acetaZOLAMIDE  500 mg Oral BID Darcy Duran PA-C      aluminum-magnesium hydroxide-simethicone  30 mL Oral Q4H PRN Lee Ann Garcia MD      haloperidol lactate  2.5 mg Intramuscular Q4H PRN Max 4/day Lee Ann Garcia MD      And    LORazepam  1 mg Intramuscular Q4H PRN Max 4/day Lee Ann Garcia MD      And    benztropine  0.5 mg Intramuscular Q4H PRN Max 4/day Lee Ann Garcia MD      haloperidol lactate  5 mg Intramuscular Q4H PRN Max 4/day Lee Ann Garcia MD      And    LORazepam  2 mg Intramuscular Q4H PRN Max 4/day Lee Ann Garcia MD      And    benztropine  1 mg Intramuscular Q4H PRN Max 4/day Lee Ann Garcia MD      benztropine  1 mg Intramuscular Q4H PRN Max 6/day Lee Ann Garcia MD      benztropine  1 mg Oral Q4H PRN Max 6/day Lee Ann Garcia MD      cariprazine  6 mg Oral Daily Sia Alaniz MD      celecoxib  200 mg Oral BID Darcy Duran PA-C      Cholecalciferol  5,000 Units Oral Daily Darcy Duran PA-C       hydrOXYzine HCL  50 mg Oral Q6H PRN Max 4/day Lee Ann Garcia MD      Or    diphenhydrAMINE  50 mg Intramuscular Q6H PRN Lee Ann Garcia MD      docusate sodium  100 mg Oral BID Sia Alaniz MD      DULoxetine  60 mg Oral BID Sia Alaniz MD      fluvoxaMINE  50 mg Oral HS Sia Alaniz MD      folic acid  1 mg Oral Daily Darcy Duran PA-C      gabapentin  300 mg Oral After Breakfast Darcy Duran PA-C      gabapentin  900 mg Oral HS Darcy Duran PA-C      hydrOXYzine HCL  100 mg Oral Q6H PRN Max 4/day Lee Ann Garcia MD      Or    LORazepam  2 mg Intramuscular Q6H PRN Lee Ann Garcia MD      hydrOXYzine HCL  25 mg Oral Q6H PRN Max 4/day Lee Ann Garcia MD      levothyroxine  112 mcg Oral Early Morning Darcy Duran PA-C      loratadine  10 mg Oral Daily Darcy Duran PA-C      lubiprostone  8 mcg Oral BID Darcy Duran PA-C      metFORMIN  500 mg Oral Daily With Breakfast Darcy Duran PA-C      multivitamin stress formula  1 tablet Oral Daily Darcy Duran PA-C      oxybutynin  10 mg Oral Daily Darcy Duran PA-C      pantoprazole  40 mg Oral BID Darcy Duran PA-C      polyethylene glycol  17 g Oral Daily PRN Lee Ann Garcia MD      propranolol  10 mg Oral Q8H PRN Lee Ann Garcia MD      rimegepant sulfate  75 mg Oral Every Other Day Darcy Duran PA-C      risperiDONE  0.25 mg Oral Q4H PRN Max 6/day Lee Ann Garcia MD      risperiDONE  0.5 mg Oral Q4H PRN Max 3/day Lee Ann Garcia MD      risperiDONE  1 mg Oral Q4H PRN Max 6/day Lee Ann Garcia MD      [START ON 10/30/2024] Semaglutide-Weight Management  1.7 mg Subcutaneous Weekly Darcy Duran PA-C      senna-docusate sodium  1 tablet Oral Daily PRN Lee Ann Garcia MD      spironolactone  25 mg Oral Daily Darcy Duran PA-C       topiramate  150 mg Oral BID Lee Ann Garcia MD      traZODone  50 mg Oral HS PRN Lee Ann Garcia MD         Counseling / Coordination of Care:  Patient's progress discussed with staff in treatment team meeting.        This note has been constructed using a voice recognition system. There may be translation, syntax,  or grammatical errors. If you have any questions, please contact the dictating provider.     Joey Leonard MD    10/26/24  Psychiatry Resident, PGY-2

## 2024-10-26 NOTE — PLAN OF CARE
Problem: Alteration in Thoughts and Perception  Goal: Verbalize thoughts and feelings  Description: Interventions:  - Promote a nonjudgmental and trusting relationship with the patient through active listening and therapeutic communication  - Assess patient's level of functioning, behavior and potential for risk  - Engage patient in 1 on 1 interactions  - Encourage patient to express fears, feelings, frustrations, and discuss symptoms    - [place] patient to reality, help patient recognize reality-based thinking   - Administer medications as ordered and assess for potential side effects  - Provide the patient education related to the signs and symptoms of the illness and desired effects of prescribed medications  Outcome: Progressing  Goal: Refrain from acting on delusional thinking/internal stimuli  Description: Interventions:  - Monitor patient closely, per order   - Utilize least restrictive measures   - Set reasonable limits, give positive feedback for acceptable   - Administer medications as ordered and monitor of potential side effects  Outcome: Progressing  Goal: Agree to be compliant with medication regime, as prescribed and report medication side effects  Description: Interventions:  - Offer appropriate PRN medication and supervise ingestion; conduct AIMS, as needed   Outcome: Progressing  Goal: Attend and participate in unit activities, including therapeutic, recreational, and educational groups  Description: Interventions:  -Encourage Visitation and family involvement in care  Outcome: Progressing  Goal: Complete daily ADLs, including personal hygiene independently, as able  Description: Interventions:  - Observe, teach, and assist patient with ADLS  - Monitor and promote a balance of rest/activity, with adequate nutrition and elimination   Outcome: Progressing     Problem: Ineffective Coping  Goal: Demonstrates healthy coping skills  Outcome: Progressing  Goal: Participates in unit  activities  Description: Interventions:  - Provide therapeutic environment   - Provide required programming   - Redirect inappropriate behaviors   Outcome: Progressing  Goal: Understands least restrictive measures  Description: Interventions:  - Utilize least restrictive behavior  Outcome: Progressing  Goal: Free from restraint events  Description: - Utilize least restrictive measures   - Provide behavioral interventions   - Redirect inappropriate behaviors   Outcome: Progressing     Problem: Risk for Self Injury/Neglect  Goal: Treatment Goal: Remain safe during length of stay, learn and adopt new coping skills, and be free of self-injurious ideation, impulses and acts at the time of discharge  Outcome: Progressing  Goal: Refrain from harming self  Description: Interventions:  - Monitor patient closely, per order  - Develop a trusting relationship  - Supervise medication ingestion, monitor effects and side effects   Outcome: Progressing     Problem: Depression  Goal: Refrain from harming self  Description: Interventions:  - Monitor patient closely, per order   - Supervise medication ingestion, monitor effects and side effects   Outcome: Progressing  Goal: Refrain from isolation  Description: Interventions:  - Develop a trusting relationship   - Encourage socialization   Outcome: Progressing  Goal: Refrain from self-neglect  Outcome: Progressing     Problem: Anxiety  Goal: Anxiety is at manageable level  Description: Interventions:  - Assess and monitor patient's anxiety level.   - Monitor for signs and symptoms (heart palpitations, chest pain, shortness of breath, headaches, nausea, feeling jumpy, restlessness, irritable, apprehensive).   - Collaborate with interdisciplinary team and initiate plan and interventions as ordered.  - [place] patient to unit/surroundings  - Explain treatment plan  - Encourage participation in care  - Encourage verbalization of concerns/fears  - Identify coping mechanisms  - Assist in  developing anxiety-reducing skills  - Administer/offer alternative therapies  - Limit or eliminate stimulants  Outcome: Progressing     Problem: PAIN - ADULT  Goal: Verbalizes/displays adequate comfort level or baseline comfort level  Description: Interventions:  - Encourage patient to monitor pain and request assistance  - Assess pain using appropriate pain scale  - Administer analgesics based on type and severity of pain and evaluate response  - Implement non-pharmacological measures as appropriate and evaluate response  - Consider cultural and social influences on pain and pain management  - Notify physician/advanced practitioner if interventions unsuccessful or patient reports new pain  Outcome: Progressing     Problem: SAFETY ADULT  Goal: Patient will remain free of falls  Description: INTERVENTIONS:  - Educate patient/family on patient safety including physical limitations  - Instruct patient to call for assistance with activity   - Consult OT/PT to assist with strengthening/mobility   - Keep Call bell within reach  - Keep bed low and locked with side rails adjusted as appropriate  - Keep care items and personal belongings within reach  - Initiate and maintain comfort rounds  - Make Fall Risk Sign visible to staff  - Apply yellow socks and bracelet for high fall risk patients  - Consider moving patient to room near nurses station  Outcome: Progressing     Problem: Nutrition/Hydration-ADULT  Goal: Nutrient/Hydration intake appropriate for improving, restoring or maintaining nutritional needs  Description: Monitor and assess patient's nutrition/hydration status for malnutrition. Collaborate with interdisciplinary team and initiate plan and interventions as ordered.  Monitor patient's weight and dietary intake as ordered or per policy. Utilize nutrition screening tool and intervene as necessary. Determine patient's food preferences and provide high-protein, high-caloric foods as appropriate.     INTERVENTIONS:  -  Monitor oral intake, urinary output, labs, and treatment plans  - Assess nutrition and hydration status and recommend course of action  - Evaluate amount of meals eaten  - Assist patient with eating if necessary   - Allow adequate time for meals  - Recommend/ encourage appropriate diets, oral nutritional supplements, and vitamin/mineral supplements  - Order, calculate, and assess calorie counts as needed  - Recommend, monitor, and adjust tube feedings and TPN/PPN based on assessed needs  - Assess need for intravenous fluids  - Provide specific nutrition/hydration education as appropriate  - Include patient/family/caregiver in decisions related to nutrition  Outcome: Progressing

## 2024-10-27 PROCEDURE — 99232 SBSQ HOSP IP/OBS MODERATE 35: CPT | Performed by: HOSPITALIST

## 2024-10-27 RX ADMIN — LINACLOTIDE 1 CAPSULE: 290 CAPSULE, GELATIN COATED ORAL at 15:03

## 2024-10-27 RX ADMIN — CARIPRAZINE 6 MG: 3 CAPSULE, GELATIN COATED ORAL at 09:24

## 2024-10-27 RX ADMIN — METFORMIN HYDROCHLORIDE 500 MG: 500 TABLET ORAL at 09:24

## 2024-10-27 RX ADMIN — FLUVOXAMINE MALEATE 50 MG: 50 TABLET ORAL at 21:04

## 2024-10-27 RX ADMIN — TOPIRAMATE 150 MG: 25 TABLET, FILM COATED ORAL at 18:21

## 2024-10-27 RX ADMIN — OXYBUTYNIN CHLORIDE 10 MG: 5 TABLET, EXTENDED RELEASE ORAL at 09:23

## 2024-10-27 RX ADMIN — POLYETHYLENE GLYCOL 3350 17 G: 17 POWDER, FOR SOLUTION ORAL at 12:08

## 2024-10-27 RX ADMIN — DULOXETINE HYDROCHLORIDE 60 MG: 60 CAPSULE, DELAYED RELEASE ORAL at 18:21

## 2024-10-27 RX ADMIN — ACETAZOLAMIDE 500 MG: 250 TABLET ORAL at 09:24

## 2024-10-27 RX ADMIN — GABAPENTIN 900 MG: 600 TABLET, FILM COATED ORAL at 21:03

## 2024-10-27 RX ADMIN — ACETAZOLAMIDE 500 MG: 250 TABLET ORAL at 18:20

## 2024-10-27 RX ADMIN — SPIRONOLACTONE 25 MG: 25 TABLET ORAL at 09:24

## 2024-10-27 RX ADMIN — DOCUSATE SODIUM 100 MG: 100 CAPSULE, LIQUID FILLED ORAL at 09:24

## 2024-10-27 RX ADMIN — Medication 5000 UNITS: at 09:24

## 2024-10-27 RX ADMIN — LUBIPROSTONE 8 MCG: 8 CAPSULE, GELATIN COATED ORAL at 09:25

## 2024-10-27 RX ADMIN — PANTOPRAZOLE SODIUM 40 MG: 40 TABLET, DELAYED RELEASE ORAL at 18:21

## 2024-10-27 RX ADMIN — DULOXETINE HYDROCHLORIDE 60 MG: 60 CAPSULE, DELAYED RELEASE ORAL at 09:24

## 2024-10-27 RX ADMIN — FOLIC ACID 1 MG: 1 TABLET ORAL at 09:24

## 2024-10-27 RX ADMIN — CYCLOSPORINE 1 DROP: 0 SOLUTION/ DROPS OPHTHALMIC; TOPICAL at 16:47

## 2024-10-27 RX ADMIN — PANTOPRAZOLE SODIUM 40 MG: 40 TABLET, DELAYED RELEASE ORAL at 09:24

## 2024-10-27 RX ADMIN — CELECOXIB 200 MG: 100 CAPSULE ORAL at 18:21

## 2024-10-27 RX ADMIN — GABAPENTIN 300 MG: 300 CAPSULE ORAL at 09:24

## 2024-10-27 RX ADMIN — LORATADINE 10 MG: 10 TABLET ORAL at 09:24

## 2024-10-27 RX ADMIN — CELECOXIB 200 MG: 100 CAPSULE ORAL at 09:23

## 2024-10-27 RX ADMIN — TOPIRAMATE 150 MG: 25 TABLET, FILM COATED ORAL at 09:24

## 2024-10-27 RX ADMIN — DOCUSATE SODIUM 100 MG: 100 CAPSULE, LIQUID FILLED ORAL at 18:20

## 2024-10-27 RX ADMIN — HYDROXYZINE HYDROCHLORIDE 100 MG: 50 TABLET, FILM COATED ORAL at 12:17

## 2024-10-27 RX ADMIN — LEVOTHYROXINE SODIUM 112 MCG: 112 TABLET ORAL at 06:32

## 2024-10-27 RX ADMIN — RIMEGEPANT SULFATE 75 MG: 75 TABLET, ORALLY DISINTEGRATING ORAL at 09:24

## 2024-10-27 RX ADMIN — B-COMPLEX W/ C & FOLIC ACID TAB 1 TABLET: TAB at 09:24

## 2024-10-27 NOTE — PLAN OF CARE
Problem: Alteration in Thoughts and Perception  Goal: Verbalize thoughts and feelings  Description: Interventions:  - Promote a nonjudgmental and trusting relationship with the patient through active listening and therapeutic communication  - Assess patient's level of functioning, behavior and potential for risk  - Engage patient in 1 on 1 interactions  - Encourage patient to express fears, feelings, frustrations, and discuss symptoms    - [place] patient to reality, help patient recognize reality-based thinking   - Administer medications as ordered and assess for potential side effects  - Provide the patient education related to the signs and symptoms of the illness and desired effects of prescribed medications  Outcome: Progressing  Goal: Complete daily ADLs, including personal hygiene independently, as able  Description: Interventions:  - Observe, teach, and assist patient with ADLS  - Monitor and promote a balance of rest/activity, with adequate nutrition and elimination   Outcome: Progressing     Problem: Risk for Self Injury/Neglect  Goal: Treatment Goal: Remain safe during length of stay, learn and adopt new coping skills, and be free of self-injurious ideation, impulses and acts at the time of discharge  Outcome: Progressing  Goal: Refrain from harming self  Description: Interventions:  - Monitor patient closely, per order  - Develop a trusting relationship  - Supervise medication ingestion, monitor effects and side effects   Outcome: Progressing     Problem: Depression  Goal: Refrain from harming self  Description: Interventions:  - Monitor patient closely, per order   - Supervise medication ingestion, monitor effects and side effects   Outcome: Progressing   See chart note

## 2024-10-27 NOTE — PROGRESS NOTES
"Progress Note - Behavioral Health   Name: Gita Rendon 39 y.o. female I MRN: [id number]  Unit/Bed#: -02 I Date of Admission: 10/24/2024   Date of Service: 10/27/2024 I Hospital Day: 3  Encounter: [id number]        Assessment & Plan  Severe episode of recurrent major depressive disorder, without psychotic features (HCC)  Patient presenting with increased depression, thoughts of self-harm due to stressors of losses, loneliness and physical issues including headaches from ketamine therapy.    Plan:  Continue:  Cymbalta 60 mg twice daily,   Vraylar 6 mg daily   Luvox 50 mg at bedtime.  EILEEN (generalized anxiety disorder)  Medications as above      Plan:  All current active meds have been reviewed.  Continue current psychotropic medications.  Medical management per medical team.  Behavioral Health checks for safety monitoring  Continue treatment with group therapy, milieu therapy and occupational therapy    Risks / Benefits of Treatment:  Risks, benefits, and possible side effects of medications explained to patient and patient verbalizes understanding and agreement for treatment.      Subjective:  The patient was evaluated this morning for continuity of care and no acute distress noted throughout the evaluation. Over the past 24 hours staff noted that Gita  continues ruminating and putting blame on others for her misfortunes . Patient has been medication adherent.  Today on evaluation,Gita endorses moderate anxiety, which she says her baseline, but her depression has slightly improved. The patient continues to have fleeting suicidal thoughts, and states that she is not sure if can contract for safety.  Pt had vivid dreams last night, but mostly slept better. Today she is slightly brighter on approach, and says she feels slightly better and \"not physically weird\", because her HA diminished today. Patient is more social today and comes out of her room more.   The patient    Psychiatric Review of " Systems:  Behavior over the last 24 hours: some improvement.   Sleep: slept better  Appetite: normal  Medication side effects: No   ROS: no complaints    Mental Status Examination:  Appearance:  casually dressed, looks stated age, overweight, tattooed, wearing eyeglasses.   Behavior:  pleasant, cooperative, calm   Speech:  normal rate and volume   Mood:  less anxious, less depressed   Affect:  brighter   Thought Process:  goal directed, linear, normal rate of thoughts   Associations: intact associations   Thought Content:  normal, no overt delusions   Perceptual Disturbances: none   Risk Potential: Suicidal ideation - Yes, fleeting suicidal thoughts  Homicidal ideation - None   Sensorium:  oriented to person, place, and time/date   Memory:  recent and remote memory grossly intact   Consciousness:  alert and awake   Attention/Concentration: attention span and concentration are age appropriate   Insight:  partial   Judgment: limited   Gait/Station: normal gait/station, normal balance   Motor Activity: no abnormal movements       Vital signs in last 24 hours:  Temp:  [97.4 °F (36.3 °C)-98.6 °F (37 °C)] 97.5 °F (36.4 °C)  HR:  [] 66  BP: (108-125)/(80-85) 125/80  Resp:  [16-18] 16  SpO2:  [96 %-99 %] 99 %  O2 Device: None (Room air)       Laboratory results: I have personally reviewed all pertinent laboratory/tests results      Progress Toward Goals: some improvement    Current Medications:  Current Facility-Administered Medications   Medication Dose Route Frequency Provider Last Rate    acetaminophen  650 mg Oral Q6H PRN Lee Ann Garcia MD      acetaminophen  650 mg Oral Q4H PRN Lee Ann Garcia MD      acetaminophen  975 mg Oral Q6H PRN Lee Ann Garcia MD      acetaZOLAMIDE  500 mg Oral BID Darcy Duran PA-C      aluminum-magnesium hydroxide-simethicone  30 mL Oral Q4H PRN Lee Ann Garcia MD      haloperidol lactate  2.5 mg Intramuscular Q4H PRN Max 4/day Lee Ann  MD Jose      And    LORazepam  1 mg Intramuscular Q4H PRN Max 4/day Lee Ann Garcia MD      And    benztropine  0.5 mg Intramuscular Q4H PRN Max 4/day Lee Ann Garcia MD      haloperidol lactate  5 mg Intramuscular Q4H PRN Max 4/day Lee Ann Garcia MD      And    LORazepam  2 mg Intramuscular Q4H PRN Max 4/day Lee Ann Garcia MD      And    benztropine  1 mg Intramuscular Q4H PRN Max 4/day Lee Ann Garcia MD      benztropine  1 mg Intramuscular Q4H PRN Max 6/day Lee Ann Garcia MD      benztropine  1 mg Oral Q4H PRN Max 6/day Lee Ann Garcia MD      cariprazine  6 mg Oral Daily Sia Alaniz MD      celecoxib  200 mg Oral BID Darcy Duran PA-C      Cholecalciferol  5,000 Units Oral Daily Darcy Duran PA-C      cycloSPORINE (PF)  1 drop Both Eyes BID Teresa Ames PA-C      hydrOXYzine HCL  50 mg Oral Q6H PRN Max 4/day Lee Ann Garcia MD      Or    diphenhydrAMINE  50 mg Intramuscular Q6H PRN Lee Ann Garcia MD      docusate sodium  100 mg Oral BID Sia Alaniz MD      DULoxetine  60 mg Oral BID Sia Alaniz MD      fluvoxaMINE  50 mg Oral HS Sia Alaniz MD      folic acid  1 mg Oral Daily Darcy Duran PA-C      gabapentin  300 mg Oral After Breakfast Darcy Duran PA-C      gabapentin  900 mg Oral HS Darcy Duran PA-C      hydrOXYzine HCL  100 mg Oral Q6H PRN Max 4/day Lee Ann Garcia MD      Or    LORazepam  2 mg Intramuscular Q6H PRN Lee Ann Garcia MD      hydrOXYzine HCL  25 mg Oral Q6H PRN Max 4/day Lee Ann Garcia MD      levothyroxine  112 mcg Oral Early Morning Darcy Duran PA-C      linaCLOtide  1 capsule Oral Daily Teresa Ames PA-C      loratadine  10 mg Oral Daily Darcy Duran PA-C      metFORMIN  500 mg Oral Daily With Breakfast Darcy Duran PA-C      multivitamin stress formula   1 tablet Oral Daily Darcy Duran PA-C      oxybutynin  10 mg Oral Daily Darcy Duran PA-C      pantoprazole  40 mg Oral BID Darcy Duran PA-C      polyethylene glycol  17 g Oral Daily PRN Lee Ann Garcia MD      propranolol  10 mg Oral Q8H PRN Lee Ann Garcia MD      rimegepant sulfate  75 mg Oral Every Other Day Darcy Duran PA-C      risperiDONE  0.25 mg Oral Q4H PRN Max 6/day Lee Ann Garcia MD      risperiDONE  0.5 mg Oral Q4H PRN Max 3/day Lee Ann Garcia MD      risperiDONE  1 mg Oral Q4H PRN Max 6/day Lee Ann Garcia MD      [START ON 10/30/2024] Semaglutide-Weight Management  1.7 mg Subcutaneous Weekly Darcy Duran PA-C      senna-docusate sodium  1 tablet Oral Daily PRN Lee Ann Garcia MD      spironolactone  25 mg Oral Daily Darcy Duran PA-C      topiramate  150 mg Oral BID Lee Ann Garcia MD      traZODone  50 mg Oral HS PRN Lee Ann Garcia MD         Counseling / Coordination of Care:  Patient's progress discussed with staff in treatment team meeting.  Medications, treatment progress and treatment plan reviewed with patient.        This note has been constructed using a voice recognition system. There may be translation, syntax,  or grammatical errors. If you have any questions, please contact the dictating provider.     Joey Leonard MD    10/27/24  Psychiatry Resident, PGY-2

## 2024-10-27 NOTE — PROGRESS NOTES
"Progress Note - Gita Rendon 39 y.o. female MRN: [id number]    Unit/Bed#: [facility] 258-02 Encounter: [id number]        Subjective:   Patient seen and examined at bedside after reviewing the chart and discussing the case with the caring staff.      Patient examined at bedside.  Patient brought Linzess and eye drops from home which she would like ordered.  She feels Amitiza is not working well for her as her last bowel movement was few days ago.  No abd pain, nausea, vomiting.     Physical Exam   Vitals: Blood pressure 113/85, pulse 65, temperature 98.2 °F (36.8 °C), temperature source Temporal, resp. rate 18, height 5' 8\" (1.727 m), weight (!) 146 kg (321 lb 12.8 oz), last menstrual period 10/15/2024, SpO2 98%.,Body mass index is 48.93 kg/m².  Constitutional: Patient in no acute distress.  HEENT: PERR, EOMI, MMM.  Cardiovascular: Normal rate and regular rhythm.    Pulmonary/Chest: Effort normal and breath sounds normal.   Abdomen: Soft, + BS, NT.    Assessment/Plan:  Gita Rendon is a(n) 39 y.o. female with MDD.     Chronic migraines.  Patient on topamax 150 mg twice daily, Nurtec 75 mg every other day, and Ajovy 225 MG/1.5ML auto-injector monthly (last does 1 week ago).  Intracranial HTN.  Continue Diamox 250 mg daily.  Allergic rhinitis.  Patient on Zyrtec 10 mg daily.   Dry eyes.  Patient on Cequa 0.09 % 1 drop twice daily (brought from home)  GERD.  Patient on Prilosec 20 mg twice daily.   Hypothyroidism.  Continue levothyroxine 112 mcg daily.   Hyperaldosteronism.  Continue spironolactone 25 mg daily.   Morbid obesity/prediabetes.  Hgb A1c 5.4% on 10/25/24.  Continue metformin 500 mg daily and Wegovy 1.7mg/0.75 mL weekly (brought from home).   Constipation.  Continue Linzess 290 mcg daily (brought from home) and colace 100 mg twice daily.   Chronic back pain.  Patient is on gabapentin 300 mg AM and 900 mg HS, Celebrex 200 mg twice daily.    Vitamin deficiency.  Continue daily supplements.     The patient was " discussed with Dr. Liao and he is in agreement with the above note.

## 2024-10-27 NOTE — NURSING NOTE
Patient visible on unit. Flat  affect. Pleasant and cooperative.Schedule PO medication administered as ordered. Denies hallucination, HI, SI.Continue on safety check

## 2024-10-27 NOTE — ASSESSMENT & PLAN NOTE
Patient presenting with increased depression, thoughts of self-harm due to stressors of losses, loneliness and physical issues including headaches from ketamine therapy.    Plan:  Continue:  Cymbalta 60 mg twice daily,   Vraylar 6 mg daily   Luvox 50 mg at bedtime.

## 2024-10-28 PROCEDURE — 99232 SBSQ HOSP IP/OBS MODERATE 35: CPT | Performed by: PSYCHIATRY & NEUROLOGY

## 2024-10-28 RX ADMIN — DOCUSATE SODIUM 100 MG: 100 CAPSULE, LIQUID FILLED ORAL at 18:14

## 2024-10-28 RX ADMIN — CELECOXIB 200 MG: 100 CAPSULE ORAL at 08:54

## 2024-10-28 RX ADMIN — B-COMPLEX W/ C & FOLIC ACID TAB 1 TABLET: TAB at 08:55

## 2024-10-28 RX ADMIN — CYCLOSPORINE 1 DROP: 0 SOLUTION/ DROPS OPHTHALMIC; TOPICAL at 18:21

## 2024-10-28 RX ADMIN — FLUVOXAMINE MALEATE 50 MG: 50 TABLET ORAL at 20:52

## 2024-10-28 RX ADMIN — OXYBUTYNIN CHLORIDE 10 MG: 5 TABLET, EXTENDED RELEASE ORAL at 08:54

## 2024-10-28 RX ADMIN — CYCLOSPORINE 1 DROP: 0 SOLUTION/ DROPS OPHTHALMIC; TOPICAL at 09:01

## 2024-10-28 RX ADMIN — GABAPENTIN 900 MG: 600 TABLET, FILM COATED ORAL at 20:51

## 2024-10-28 RX ADMIN — PANTOPRAZOLE SODIUM 40 MG: 40 TABLET, DELAYED RELEASE ORAL at 08:54

## 2024-10-28 RX ADMIN — PANTOPRAZOLE SODIUM 40 MG: 40 TABLET, DELAYED RELEASE ORAL at 18:14

## 2024-10-28 RX ADMIN — ACETAZOLAMIDE 500 MG: 250 TABLET ORAL at 18:14

## 2024-10-28 RX ADMIN — METFORMIN HYDROCHLORIDE 500 MG: 500 TABLET ORAL at 08:54

## 2024-10-28 RX ADMIN — TOPIRAMATE 150 MG: 25 TABLET, FILM COATED ORAL at 18:14

## 2024-10-28 RX ADMIN — TOPIRAMATE 150 MG: 25 TABLET, FILM COATED ORAL at 08:54

## 2024-10-28 RX ADMIN — CARIPRAZINE 6 MG: 3 CAPSULE, GELATIN COATED ORAL at 08:55

## 2024-10-28 RX ADMIN — DULOXETINE HYDROCHLORIDE 60 MG: 60 CAPSULE, DELAYED RELEASE ORAL at 08:55

## 2024-10-28 RX ADMIN — SPIRONOLACTONE 25 MG: 25 TABLET ORAL at 08:55

## 2024-10-28 RX ADMIN — DOCUSATE SODIUM 100 MG: 100 CAPSULE, LIQUID FILLED ORAL at 08:54

## 2024-10-28 RX ADMIN — LINACLOTIDE 1 CAPSULE: 290 CAPSULE, GELATIN COATED ORAL at 09:01

## 2024-10-28 RX ADMIN — LORATADINE 10 MG: 10 TABLET ORAL at 08:55

## 2024-10-28 RX ADMIN — LEVOTHYROXINE SODIUM 112 MCG: 112 TABLET ORAL at 05:44

## 2024-10-28 RX ADMIN — GABAPENTIN 300 MG: 300 CAPSULE ORAL at 08:55

## 2024-10-28 RX ADMIN — Medication 5000 UNITS: at 08:54

## 2024-10-28 RX ADMIN — ACETAZOLAMIDE 500 MG: 250 TABLET ORAL at 08:54

## 2024-10-28 RX ADMIN — DULOXETINE HYDROCHLORIDE 60 MG: 60 CAPSULE, DELAYED RELEASE ORAL at 18:14

## 2024-10-28 RX ADMIN — FOLIC ACID 1 MG: 1 TABLET ORAL at 08:54

## 2024-10-28 RX ADMIN — CELECOXIB 200 MG: 100 CAPSULE ORAL at 18:14

## 2024-10-28 NOTE — PROGRESS NOTES
10/28/24    Team Meeting   Meeting Type Daily Rounds   Team Members Present   Team Members Present Physician;Nurse;;Occupational Therapist   Physician Team Member David COOL, Adria COOL, Aisha COOL, Gauri YAP   Nursing Team Member Neeta CHRISTIAN   Social Work Team Member Javier MOSS, Shi TORRES   OT Team Member Pope PHYLLIS   Patient/Family Present   Patient Present No   Patient's Family Present No     201, endorses fleeting SI, maintains safety, reports self isolating when dep, reports hx of eating disorder, sleeping wel, denies SI/Hi today, socialized over the weekend

## 2024-10-28 NOTE — PROGRESS NOTES
"Progress Note - Gita Rendon 39 y.o. female MRN: [id number]    Unit/Bed#: [facility] 258-02 Encounter: [id number]        Subjective:   Patient seen and examined at bedside after reviewing the chart and discussing the case with the caring staff.      Patient examined at bedside.  Patient denies any acute symptoms.     Physical Exam   Vitals: Blood pressure 125/80, pulse 66, temperature 97.5 °F (36.4 °C), temperature source Temporal, resp. rate 16, height 5' 8\" (1.727 m), weight (!) 146 kg (321 lb 12.8 oz), last menstrual period 10/15/2024, SpO2 99%.,Body mass index is 48.93 kg/m².  Constitutional: Patient in no acute distress.  HEENT: PERR, EOMI, MMM.  Cardiovascular: Normal rate and regular rhythm.    Pulmonary/Chest: Effort normal and breath sounds normal.   Abdomen: Soft, + BS, NT.    Assessment/Plan:  Gita Rendon is a(n) 39 y.o. female with MDD.     Chronic migraines.  Patient on topamax 150 mg twice daily, Nurtec 75 mg every other day, and Ajovy 225 MG/1.5ML auto-injector monthly (last does 1 week ago).  Intracranial HTN.  Continue Diamox 250 mg daily.  Allergic rhinitis.  Patient on Zyrtec 10 mg daily.   Dry eyes.  Patient on Cequa 0.09 % 1 drop twice daily (brought from home)  GERD.  Patient on Prilosec 20 mg twice daily.   Hypothyroidism.  Continue levothyroxine 112 mcg daily.   Hyperaldosteronism.  Continue spironolactone 25 mg daily.   Morbid obesity/prediabetes.  Hgb A1c 5.4% on 10/25/24.  Continue metformin 500 mg daily and Wegovy 1.7mg/0.75 mL weekly (brought from home).   Constipation.  Continue Linzess 290 mcg daily (brought from home) and colace 100 mg twice daily.   Chronic back pain.  Patient is on gabapentin 300 mg AM and 900 mg HS, Celebrex 200 mg twice daily.    Vitamin deficiency.  Continue daily supplements.     The patient was discussed with Dr. Liao and he is in agreement with the above note.  "

## 2024-10-28 NOTE — PLAN OF CARE
Problem: Alteration in Thoughts and Perception  Goal: Agree to be compliant with medication regime, as prescribed and report medication side effects  Description: Interventions:  - Offer appropriate PRN medication and supervise ingestion; conduct AIMS, as needed   Outcome: Progressing  Goal: Complete daily ADLs, including personal hygiene independently, as able  Description: Interventions:  - Observe, teach, and assist patient with ADLS  - Monitor and promote a balance of rest/activity, with adequate nutrition and elimination   Outcome: Progressing     Problem: Risk for Self Injury/Neglect  Goal: Treatment Goal: Remain safe during length of stay, learn and adopt new coping skills, and be free of self-injurious ideation, impulses and acts at the time of discharge  Outcome: Progressing  Goal: Refrain from harming self  Description: Interventions:  - Monitor patient closely, per order  - Develop a trusting relationship  - Supervise medication ingestion, monitor effects and side effects   Outcome: Progressing   See chart note

## 2024-10-28 NOTE — PROGRESS NOTES
Progress Note - Behavioral Health   Name: Gita Rendon 39 y.o. female I MRN: [id number]   Unit/Bed#: -02 I Date of Admission: 10/24/2024   Date of Service: 10/28/2024 I Hospital Day: 4         Assessment & Plan  Severe episode of recurrent major depressive disorder, without psychotic features (HCC)  Patient presenting with increased depression, thoughts of self-harm due to stressors of losses, loneliness and physical issues including headaches from ketamine therapy.    Plan:  Continue:  Cymbalta 60 mg twice daily,   Vraylar 6 mg daily   Luvox 50 mg at bedtime.  EILEEN (generalized anxiety disorder)  Medications as above        Recommended Treatment:     Treatment plan and medication changes discussed and per the attending physician the plan is:     1.Continue with group therapy, milieu therapy and occupational therapy  2.Behavioral Health checks every 15 minutes  3.Continue frequent safety checks and vitals per unit protocol  4.Continue with SLIM medical management as indicated  5.Continue with current medication regimen  6.Will review labs in the a.m.  7.Disposition Planning: Discharge planning and efforts remain ongoing     Planned medication and treatment changes:    All current active medications have been reviewed  Encourage group therapy, milieu therapy and occupational therapy  Behavioral Health checks for safety monitoring  Continue treatment with group therapy, milieu therapy and occupational therapy  Discharge planning  Continue current medications:    Current medications:  Current Facility-Administered Medications   Medication Dose Route Frequency Provider Last Rate    acetaminophen  650 mg Oral Q6H PRN Lee Ann Garcia MD      acetaminophen  650 mg Oral Q4H PRN Lee Ann Garcia MD      acetaminophen  975 mg Oral Q6H PRN Lee Ann Garcia MD      acetaZOLAMIDE  500 mg Oral BID Darcy Duran PA-C      aluminum-magnesium hydroxide-simethicone  30 mL Oral Q4H PRN  Lee Ann Garcia MD      haloperidol lactate  2.5 mg Intramuscular Q4H PRN Max 4/day Lee Ann Garcia MD      And    LORazepam  1 mg Intramuscular Q4H PRN Max 4/day Lee Ann Garcia MD      And    benztropine  0.5 mg Intramuscular Q4H PRN Max 4/day eLe Ann Garcia MD      haloperidol lactate  5 mg Intramuscular Q4H PRN Max 4/day Lee Ann Garcia MD      And    LORazepam  2 mg Intramuscular Q4H PRN Max 4/day Lee Ann Garcia MD      And    benztropine  1 mg Intramuscular Q4H PRN Max 4/day Lee Ann Garcia MD      benztropine  1 mg Intramuscular Q4H PRN Max 6/day Lee Ann Garcia MD      benztropine  1 mg Oral Q4H PRN Max 6/day Lee Ann Garcia MD      cariprazine  6 mg Oral Daily Sia Alaniz MD      celecoxib  200 mg Oral BID Darcy Duran PA-C      Cholecalciferol  5,000 Units Oral Daily Darcy Duran PA-C      cycloSPORINE (PF)  1 drop Both Eyes BID Teresa Ames PA-C      hydrOXYzine HCL  50 mg Oral Q6H PRN Max 4/day Lee Ann Garcia MD      Or    diphenhydrAMINE  50 mg Intramuscular Q6H PRN Lee Ann Garcia MD      docusate sodium  100 mg Oral BID Sia Alaniz MD      DULoxetine  60 mg Oral BID Sia Alaniz MD      fluvoxaMINE  50 mg Oral HS Sia Alaniz MD      folic acid  1 mg Oral Daily Darcy Duran PA-C      gabapentin  300 mg Oral After Breakfast Darcy Duran PA-C      gabapentin  900 mg Oral HS Darcy Duran PA-C      hydrOXYzine HCL  100 mg Oral Q6H PRN Max 4/day Lee Ann Garcia MD      Or    LORazepam  2 mg Intramuscular Q6H PRN Lee Ann Garcia MD      hydrOXYzine HCL  25 mg Oral Q6H PRN Max 4/day Lee Ann Garcia MD      levothyroxine  112 mcg Oral Early Morning Darcy Duran PA-C      linaCLOtide  1 capsule Oral Daily Teresa Ames PA-C      loratadine  10 mg Oral Daily Darcy Duran PA-C       metFORMIN  500 mg Oral Daily With Breakfast Darcy Duran PA-C      multivitamin stress formula  1 tablet Oral Daily Darcy Duran PA-C      oxybutynin  10 mg Oral Daily Darcy Duran PA-C      pantoprazole  40 mg Oral BID Darcy Duran PA-C      polyethylene glycol  17 g Oral Daily PRN Lee Ann Garcia MD      propranolol  10 mg Oral Q8H PRN Lee Ann Garcia MD      rimegepant sulfate  75 mg Oral Every Other Day Darcy Duran PA-C      risperiDONE  0.25 mg Oral Q4H PRN Max 6/day Lee Ann Garcia MD      risperiDONE  0.5 mg Oral Q4H PRN Max 3/day Lee Ann Garcia MD      risperiDONE  1 mg Oral Q4H PRN Max 6/day Lee Ann Garcia MD      [START ON 10/30/2024] Semaglutide-Weight Management  1.7 mg Subcutaneous Weekly Darcy Duran PA-C      senna-docusate sodium  1 tablet Oral Daily PRN Lee Ann Garcia MD      spironolactone  25 mg Oral Daily Darcy Duran PA-C      topiramate  150 mg Oral BID Lee Ann Garcia MD      traZODone  50 mg Oral HS PRN Lee Ann Garcia MD         Risks / Benefits of Treatment:    Risks, benefits, and possible side effects of medications explained to patient and patient verbalizes understanding and agreement for treatment.    Subjective:    Behavior over the last 24 hours: unchanged.     Per staff, Elizabeth has been pleasant and cooperative on the unit.  She was visible in the day room yesterday.  She has been attending groups.     Elizabeth was seen in this writers office for psychiatric follow up today.  She reports that she came to the hospital for a migraine but her suicidal thoughts was the bigger problem.  She reports that since her father passed 3 years ago she has been struggling with severe depression.  She admits to continued negative thought patterns and ruminating thoughts.  She feels that she is very isolated and does not leave her apartment much.  She has  considered going to a residential facility but is not confident that is her best option.  She reports her depression is better when she is inpatient and in contact with others.  She is forward thinking and goal oriented.  Currently denying suicidal or homicidal ideation, intent or plan.  She contracts for safety on the unit but would not feel safe if discharged.  She denies AH/VH and does not appear internally preoccupied.      Sleep: normal  Appetite: normal  Medication side effects: No   ROS: no complaints    Mental Status Evaluation:    Appearance:  casually dressed, adequate grooming, overweight   Behavior:  pleasant, cooperative, calm   Speech:  normal rate and volume   Mood:  depressed, anxious   Affect:  constricted   Thought Process:  organized, goal directed, linear   Associations: intact associations   Thought Content:  no overt delusions, negative thinking, ruminations   Perceptual Disturbances: none   Risk Potential: Suicidal ideation - None at present, contracts for safety on the unit, would not feel safe if discharged  Homicidal ideation - None  Potential for aggression - No   Sensorium:  oriented to person, place, time/date, and situation   Memory:  recent memory intact   Consciousness:  alert and awake   Attention/Concentration: attention span and concentration are age appropriate   Insight:  partial   Judgment: limited   Gait/Station: normal gait/station   Motor Activity: no abnormal movements     Vital signs in last 24 hours:    Temp:  [97.5 °F (36.4 °C)-98.8 °F (37.1 °C)] 98.8 °F (37.1 °C)  HR:  [66-72] 72  BP: (116-125)/(76-80) 116/76  Resp:  [16] 16  SpO2:  [99 %] 99 %  O2 Device: None (Room air)         Laboratory results: I have personally reviewed all pertinent laboratory/tests results    Results from the past 24 hours: No results found for this or any previous visit (from the past 24 hour(s)).  Most Recent Labs:   Lab Results   Component Value Date    WBC 11.91 (H) 10/23/2024    RBC 4.80  10/23/2024    HGB 13.5 10/23/2024    HCT 42.5 10/23/2024     10/23/2024    RDW 14.0 10/23/2024    NEUTROABS 7.44 10/23/2024    TOTANEUTABS 7.02 05/27/2020    SODIUM 140 10/23/2024    K 3.7 10/23/2024     (H) 10/23/2024    CO2 21 10/23/2024    BUN 28 (H) 10/23/2024    CREATININE 0.75 10/23/2024    GLUC 90 10/23/2024    CALCIUM 9.0 10/23/2024    AST 24 10/09/2024    ALT 26 10/09/2024    ALKPHOS 86 10/09/2024    TP 6.8 10/09/2024    ALB 4.1 10/09/2024    TBILI 0.32 10/09/2024    CHOLESTEROL 149 10/25/2024    HDL 49 (L) 10/25/2024    TRIG 78 10/25/2024    LDLCALC 84 10/25/2024    NONHDLC 100 10/25/2024    LITHIUM <0.10 (L) 07/03/2024    BLA6DIYKPOVS 2.338 10/25/2024    FREET4 0.73 07/03/2024    PREGUR negative 06/16/2020    PREGSERUM Negative 10/23/2024    HCGQUANT <2 06/01/2022    HGBA1C 5.4 10/25/2024     10/25/2024       Suicide/Homicide Risk Assessment:    Risk of Harm to Self:   Nursing Suicide Risk Assessment Last 24 hours: C-SSRS Risk (Since Last Contact)  Calculated C-SSRS Risk Score (Since Last Contact): No Risk Indicated  Current Specific Risk Factors include: recent suicidal ideation, diagnosis of mood disorder, current depressive symptoms, current anxiety symptoms  Protective Factors: no current suicidal ideation, ability to communicate with staff on the unit, able to contract for safety on the unit, taking medications as ordered on the unit, responds to redirection, compliant with mental health treatment, stable housing  Based on today's assessment, Gita presents the following risk of harm to self: low    Risk of Harm to Others:  Nursing Homicide Risk Assessment: Violence Risk to Others: Denies within past 6 months  Current Specific Risk Factors include: unstable mood disorder, social difficulties  Protective Factors: no current homicidal ideation, no current psychotic symptoms, compliant with medications on the unit as ordered, compliant with unit milieu, follows staff  redirection  Based on today's assessment, Gita presents the following risk of harm to others: minimal    The following interventions are recommended: Behavioral Health checks for safety monitoring, continued hospitalization on locked unit    Progress Toward Goals: progressing, attends groups, participates in milieu therapy    Counseling / Coordination of Care:    Total floor / unit time spent today 15 minutes. Greater than 50% of total time was spent with the patient and / or family counseling and / or coordination of care. A description of counseling / coordination of care:    Administrative Statements   I have spent a total time of 20 minutes in caring for this patient on the day of the visit/encounter including Diagnostic results, Documenting in the medical record, Reviewing / ordering tests, medicine, procedures  , Obtaining or reviewing history  , and Communicating with other healthcare professionals .    LEIGHA Carpenter 10/28/24

## 2024-10-28 NOTE — NURSING NOTE
"Patient has been visible on the unit.  Spent most of the evening in the DR.  Reports she is feeling slight improvement in her depression but the headaches she struggles with sometimes push her backwards.  Says she is \"getting over a headache\" today.  Denies any current s/I.  Says she tends to isolate and \"get up in my head\" when she is not doing well so she stayed out of her room for most of the day. Reports \"getting down on myself\" around dinner time due to ongoing issues with food.  Says she has a history of an eating disorder dating back to her 20s.   Overall less negative this evening  "

## 2024-10-28 NOTE — NURSING NOTE
Patient med and meal compliant visible on unit social with select peers calm cooperative attends group denies SI HI AVH endorse moderate depression and anxiety Safety checks maintained.

## 2024-10-29 PROCEDURE — 99232 SBSQ HOSP IP/OBS MODERATE 35: CPT | Performed by: PSYCHIATRY & NEUROLOGY

## 2024-10-29 RX ADMIN — DOCUSATE SODIUM 100 MG: 100 CAPSULE, LIQUID FILLED ORAL at 17:18

## 2024-10-29 RX ADMIN — FLUVOXAMINE MALEATE 50 MG: 50 TABLET ORAL at 20:45

## 2024-10-29 RX ADMIN — PANTOPRAZOLE SODIUM 40 MG: 40 TABLET, DELAYED RELEASE ORAL at 17:18

## 2024-10-29 RX ADMIN — OXYBUTYNIN CHLORIDE 10 MG: 5 TABLET, EXTENDED RELEASE ORAL at 09:32

## 2024-10-29 RX ADMIN — DULOXETINE HYDROCHLORIDE 60 MG: 60 CAPSULE, DELAYED RELEASE ORAL at 09:33

## 2024-10-29 RX ADMIN — PANTOPRAZOLE SODIUM 40 MG: 40 TABLET, DELAYED RELEASE ORAL at 09:33

## 2024-10-29 RX ADMIN — Medication 5000 UNITS: at 09:33

## 2024-10-29 RX ADMIN — GABAPENTIN 300 MG: 300 CAPSULE ORAL at 09:32

## 2024-10-29 RX ADMIN — ACETAZOLAMIDE 500 MG: 250 TABLET ORAL at 09:32

## 2024-10-29 RX ADMIN — ACETAZOLAMIDE 500 MG: 250 TABLET ORAL at 17:18

## 2024-10-29 RX ADMIN — CELECOXIB 200 MG: 100 CAPSULE ORAL at 17:17

## 2024-10-29 RX ADMIN — DOCUSATE SODIUM 100 MG: 100 CAPSULE, LIQUID FILLED ORAL at 09:33

## 2024-10-29 RX ADMIN — LORATADINE 10 MG: 10 TABLET ORAL at 09:33

## 2024-10-29 RX ADMIN — FOLIC ACID 1 MG: 1 TABLET ORAL at 09:33

## 2024-10-29 RX ADMIN — CELECOXIB 200 MG: 100 CAPSULE ORAL at 09:32

## 2024-10-29 RX ADMIN — DULOXETINE HYDROCHLORIDE 60 MG: 60 CAPSULE, DELAYED RELEASE ORAL at 17:18

## 2024-10-29 RX ADMIN — RIMEGEPANT SULFATE 75 MG: 75 TABLET, ORALLY DISINTEGRATING ORAL at 09:32

## 2024-10-29 RX ADMIN — CYCLOSPORINE 1 DROP: 0 SOLUTION/ DROPS OPHTHALMIC; TOPICAL at 17:17

## 2024-10-29 RX ADMIN — TOPIRAMATE 150 MG: 25 TABLET, FILM COATED ORAL at 17:18

## 2024-10-29 RX ADMIN — LEVOTHYROXINE SODIUM 112 MCG: 112 TABLET ORAL at 06:01

## 2024-10-29 RX ADMIN — SPIRONOLACTONE 25 MG: 25 TABLET ORAL at 09:32

## 2024-10-29 RX ADMIN — CARIPRAZINE 6 MG: 3 CAPSULE, GELATIN COATED ORAL at 09:32

## 2024-10-29 RX ADMIN — B-COMPLEX W/ C & FOLIC ACID TAB 1 TABLET: TAB at 09:33

## 2024-10-29 RX ADMIN — TOPIRAMATE 150 MG: 25 TABLET, FILM COATED ORAL at 09:32

## 2024-10-29 RX ADMIN — CYCLOSPORINE 1 DROP: 0 SOLUTION/ DROPS OPHTHALMIC; TOPICAL at 09:32

## 2024-10-29 RX ADMIN — GABAPENTIN 900 MG: 600 TABLET, FILM COATED ORAL at 20:45

## 2024-10-29 RX ADMIN — METFORMIN HYDROCHLORIDE 500 MG: 500 TABLET ORAL at 09:33

## 2024-10-29 RX ADMIN — LINACLOTIDE 1 CAPSULE: 290 CAPSULE, GELATIN COATED ORAL at 09:32

## 2024-10-29 NOTE — NURSING NOTE
Patient med and meal compliant visible on unit social with peers calm pleasant cooperative endorses continuing negative thoughts but feels better being here around peers. Denies SI HI AVH at this time safety checks maintained.

## 2024-10-29 NOTE — PROGRESS NOTES
Progress Note - Behavioral Health   Name: Gita Rendon 39 y.o. female I MRN: [id number]   Unit/Bed#: -02 I Date of Admission: 10/24/2024   Date of Service: 10/29/2024 I Hospital Day: 5         Assessment & Plan  Severe episode of recurrent major depressive disorder, without psychotic features (HCC)  Patient presenting with increased depression, thoughts of self-harm due to stressors of losses, loneliness and physical issues including headaches from ketamine therapy.    Plan:  Continue:  Cymbalta 60 mg twice daily,   Vraylar 6 mg daily   Luvox 50 mg at bedtime.  EILEEN (generalized anxiety disorder)  Medications as above        Recommended Treatment:     Treatment plan and medication changes discussed and per the attending physician the plan is:     1.Continue with group therapy, milieu therapy and occupational therapy  2.Behavioral Health checks every 15 minutes  3.Continue frequent safety checks and vitals per unit protocol  4.Continue with SLIM medical management as indicated  5.Continue with current medication regimen  6.Will review labs in the a.m.  7.Disposition Planning: Discharge planning and efforts remain ongoing     Planned medication and treatment changes:    All current active medications have been reviewed  Encourage group therapy, milieu therapy and occupational therapy  Behavioral Health checks for safety monitoring  Continue treatment with group therapy, milieu therapy and occupational therapy  Discharge planning, patient will likely be ready for discharge by end of week  Continue current medications:    Current medications:  Current Facility-Administered Medications   Medication Dose Route Frequency Provider Last Rate    acetaminophen  650 mg Oral Q6H PRN Lee Ann Garcia MD      acetaminophen  650 mg Oral Q4H PRN Lee Ann Garcia MD      acetaminophen  975 mg Oral Q6H PRN Lee Ann Garcia MD      acetaZOLAMIDE  500 mg Oral BID Darcy Duran PA-C       aluminum-magnesium hydroxide-simethicone  30 mL Oral Q4H PRN Lee Ann Garcia MD      haloperidol lactate  2.5 mg Intramuscular Q4H PRN Max 4/day Lee Ann Garcia MD      And    LORazepam  1 mg Intramuscular Q4H PRN Max 4/day Lee Ann Garcia MD      And    benztropine  0.5 mg Intramuscular Q4H PRN Max 4/day Lee Ann Garcia MD      haloperidol lactate  5 mg Intramuscular Q4H PRN Max 4/day Lee Ann Garcia MD      And    LORazepam  2 mg Intramuscular Q4H PRN Max 4/day Lee Ann Garcia MD      And    benztropine  1 mg Intramuscular Q4H PRN Max 4/day Lee Ann Garcia MD      benztropine  1 mg Intramuscular Q4H PRN Max 6/day Lee Ann Garcia MD      benztropine  1 mg Oral Q4H PRN Max 6/day Lee Ann Garcia MD      cariprazine  6 mg Oral Daily Sia Alaniz MD      celecoxib  200 mg Oral BID Darcy Duran PA-C      Cholecalciferol  5,000 Units Oral Daily Darcy Duran PA-C      cyanocobalamin  500 mcg Oral Daily Dayo Liao MD      cycloSPORINE (PF)  1 drop Both Eyes BID Teresa Ames PA-C      hydrOXYzine HCL  50 mg Oral Q6H PRN Max 4/day Lee Ann Garcia MD      Or    diphenhydrAMINE  50 mg Intramuscular Q6H PRN Lee Ann Garcia MD      docusate sodium  100 mg Oral BID Sia Alaniz MD      DULoxetine  60 mg Oral BID Sia Alaniz MD      fluvoxaMINE  50 mg Oral HS Sia Alaniz MD      folic acid  1 mg Oral Daily Darcy Duran PA-C      gabapentin  300 mg Oral After Breakfast Darcy Duran PA-C      gabapentin  900 mg Oral HS Darcy Duran PA-C      hydrOXYzine HCL  100 mg Oral Q6H PRN Max 4/day Lee Ann Garcia MD      Or    LORazepam  2 mg Intramuscular Q6H PRN Lee Ann Garcia MD      hydrOXYzine HCL  25 mg Oral Q6H PRN Max 4/day Lee Ann Garcia MD      levothyroxine  112 mcg Oral Early Morning Darcy Duran PA-C      linaCLOtide  1  capsule Oral Daily Teresa Ames PA-C      loratadine  10 mg Oral Daily Darcy Duran PA-C      metFORMIN  500 mg Oral Daily With Breakfast Darcy Duran PA-C      multivitamin stress formula  1 tablet Oral Daily Darcy Duran PA-C      oxybutynin  10 mg Oral Daily Darcy Duran PA-C      pantoprazole  40 mg Oral BID Darcy Duran PA-C      polyethylene glycol  17 g Oral Daily PRN Lee Ann Garcia MD      propranolol  10 mg Oral Q8H PRN Lee Ann Garcia MD      rimegepant sulfate  75 mg Oral Every Other Day Darcy Duran PA-C      risperiDONE  0.25 mg Oral Q4H PRN Max 6/day Lee Ann Garcia MD      risperiDONE  0.5 mg Oral Q4H PRN Max 3/day Lee Ann Garcia MD      risperiDONE  1 mg Oral Q4H PRN Max 6/day Lee Ann Garcia MD      [START ON 10/30/2024] Semaglutide-Weight Management  1.7 mg Subcutaneous Weekly Darcy Duran PA-C      senna-docusate sodium  1 tablet Oral Daily PRN Lee Ann Garcia MD      spironolactone  25 mg Oral Daily Darcy Duran PA-C      topiramate  150 mg Oral BID Lee Ann Garcia MD      traZODone  50 mg Oral HS PRN Lee Ann Garcia MD         Risks / Benefits of Treatment:    Risks, benefits, and possible side effects of medications explained to patient and patient verbalizes understanding and agreement for treatment.    Subjective:    Behavior over the last 24 hours: improving.     Per staff, Elizabeth continues to be calm and cooperative on the unit.  She was withdrawn last night and continues with negative though process.  She is compliant with meals and medication.     Elizabeth was seen to in this writers office for psychiatric follow up.  Elizabeth was calm, pleasant and cooperative throughout the interview.  She reports that continues with ruminations and negative thoughts.  She is denying any current suicidal ideation but reports this happens when she  "is home alone.  We discussed potentially retrialing Lithium for suicidal thoughts or Lamictal for depression.  She reports she has been on \"most\" of the mood stabilizers and they have not been effective.  She had supratherapeutic level of lithium in the past and does not want to trial again.  Patient likely to get most benefit from regular CBT or DBT therapy.  She reports she may try ketamine again with outpatient provider when discharged.  We discussed potential for ACT team services for further support, she was not interested, but would be interested in a peer specialist.  Denies hallucination and no evidence of psychosis or celia.     Sleep: normal  Appetite: normal  Medication side effects: No   ROS: no complaints    Mental Status Evaluation:    Appearance:  casually dressed, adequate grooming, looks stated age, overweight   Behavior:  pleasant, cooperative, calm   Speech:  normal rate and volume, fluent, clear   Mood:  less depressed   Affect:  constricted   Thought Process:  goal directed, linear   Associations: intact associations   Thought Content:  no overt delusions, negative thinking, intrusive thoughts, ruminations   Perceptual Disturbances: none   Risk Potential: Suicidal ideation - None at present, contracts for safety on the unit, would talk to staff if not feeling safe on the unit  Homicidal ideation - None  Potential for aggression - No   Sensorium:  oriented to person, place, time/date, and situation   Memory:  recent memory intact   Consciousness:  alert and awake   Attention/Concentration: attention span and concentration are age appropriate   Insight:  fair   Judgment: fair   Gait/Station: normal gait/station   Motor Activity: no abnormal movements     Vital signs in last 24 hours:    Temp:  [97.9 °F (36.6 °C)-98.8 °F (37.1 °C)] 97.9 °F (36.6 °C)  HR:  [72-91] 91  BP: (116-133)/(76-89) 133/89  Resp:  [16-17] 17  SpO2:  [96 %-99 %] 96 %  O2 Device: None (Room air)         Laboratory results: I " have personally reviewed all pertinent laboratory/tests results    Results from the past 24 hours: No results found for this or any previous visit (from the past 24 hour(s)).  Most Recent Labs:   Lab Results   Component Value Date    WBC 11.91 (H) 10/23/2024    RBC 4.80 10/23/2024    HGB 13.5 10/23/2024    HCT 42.5 10/23/2024     10/23/2024    RDW 14.0 10/23/2024    NEUTROABS 7.44 10/23/2024    TOTANEUTABS 7.02 05/27/2020    SODIUM 140 10/23/2024    K 3.7 10/23/2024     (H) 10/23/2024    CO2 21 10/23/2024    BUN 28 (H) 10/23/2024    CREATININE 0.75 10/23/2024    GLUC 90 10/23/2024    CALCIUM 9.0 10/23/2024    AST 24 10/09/2024    ALT 26 10/09/2024    ALKPHOS 86 10/09/2024    TP 6.8 10/09/2024    ALB 4.1 10/09/2024    TBILI 0.32 10/09/2024    CHOLESTEROL 149 10/25/2024    HDL 49 (L) 10/25/2024    TRIG 78 10/25/2024    LDLCALC 84 10/25/2024    NONHDLC 100 10/25/2024    LITHIUM <0.10 (L) 07/03/2024    NDM2VWGNMTOU 2.338 10/25/2024    FREET4 0.73 07/03/2024    PREGUR negative 06/16/2020    PREGSERUM Negative 10/23/2024    HCGQUANT <2 06/01/2022    HGBA1C 5.4 10/25/2024     10/25/2024       Suicide/Homicide Risk Assessment:    Risk of Harm to Self:   Nursing Suicide Risk Assessment Last 24 hours: C-SSRS Risk (Since Last Contact)  Calculated C-SSRS Risk Score (Since Last Contact): No Risk Indicated  Current Specific Risk Factors include: diagnosis of mood disorder, worries about finances or work  Protective Factors: no current suicidal ideation, ability to communicate with staff on the unit, able to contract for safety on the unit, taking medications as ordered on the unit, improved depressive symptoms, compliant with medications, compliant with mental health treatment, stable housing, medical compliance, no substance use problems, restricted access to lethal means  Based on today's assessment, Gita presents the following risk of harm to self: low    Risk of Harm to Others:  Nursing Homicide Risk  Assessment: Violence Risk to Others: Denies within past 6 months  Current Specific Risk Factors include: none  Protective Factors: no current homicidal ideation, compliant with medications on the unit as ordered, compliant with unit milieu, follows staff redirection  Based on today's assessment, Gita presents the following risk of harm to others: low    The following interventions are recommended: Behavioral Health checks for safety monitoring, continued hospitalization on locked unit    Progress Toward Goals: progressing, attends groups, participates in milieu therapy, mood is stabilizing, working on coping skills, discharge planning    Counseling / Coordination of Care:    Total floor / unit time spent today 20 minutes. Greater than 50% of total time was spent with the patient and / or family counseling and / or coordination of care. A description of counseling / coordination of care:    Administrative Statements   I have spent a total time of 40 minutes in caring for this patient on the day of the visit/encounter including Diagnostic results, Counseling / Coordination of care, Documenting in the medical record, Reviewing / ordering tests, medicine, procedures  , Obtaining or reviewing history  , and Communicating with other healthcare professionals .    LEIGHA Carpenter 10/29/24

## 2024-10-29 NOTE — PLAN OF CARE
Problem: Alteration in Thoughts and Perception  Goal: Verbalize thoughts and feelings  Description: Interventions:  - Promote a nonjudgmental and trusting relationship with the patient through active listening and therapeutic communication  - Assess patient's level of functioning, behavior and potential for risk  - Engage patient in 1 on 1 interactions  - Encourage patient to express fears, feelings, frustrations, and discuss symptoms    - Dolph patient to reality, help patient recognize reality-based thinking   - Administer medications as ordered and assess for potential side effects  - Provide the patient education related to the signs and symptoms of the illness and desired effects of prescribed medications  Outcome: Progressing  Goal: Agree to be compliant with medication regime, as prescribed and report medication side effects  Description: Interventions:  - Offer appropriate PRN medication and supervise ingestion; conduct AIMS, as needed   Outcome: Progressing  Goal: Attend and participate in unit activities, including therapeutic, recreational, and educational groups  Description: Interventions:  -Encourage Visitation and family involvement in care  Outcome: Progressing  Goal: Complete daily ADLs, including personal hygiene independently, as able  Description: Interventions:  - Observe, teach, and assist patient with ADLS  - Monitor and promote a balance of rest/activity, with adequate nutrition and elimination   Outcome: Progressing     Problem: Ineffective Coping  Goal: Identifies healthy coping skills  Outcome: Progressing     Problem: Risk for Self Injury/Neglect  Goal: Refrain from harming self  Description: Interventions:  - Monitor patient closely, per order  - Develop a trusting relationship  - Supervise medication ingestion, monitor effects and side effects   Outcome: Progressing  See chart note

## 2024-10-29 NOTE — PROGRESS NOTES
"Progress Note - Gita Rendon 39 y.o. female MRN: [id number]    Unit/Bed#: [facility] 258-02 Encounter: [id number]        Subjective:   Patient seen and examined at bedside after reviewing the chart and discussing the case with the caring staff.      Patient examined at bedside.  Patient denies any acute symptoms.    On review of patient's labs.  Patient's vitamin D level was 57.1 but B12 level was 394.    Physical Exam   Vitals: Blood pressure 133/89, pulse 91, temperature 97.9 °F (36.6 °C), temperature source Temporal, resp. rate 17, height 5' 8\" (1.727 m), weight (!) 146 kg (321 lb 12.8 oz), last menstrual period 10/15/2024, SpO2 96%.,Body mass index is 48.93 kg/m².  Constitutional: Patient in no acute distress.  HEENT: PERR, EOMI, MMM.  Cardiovascular: Normal rate and regular rhythm.    Pulmonary/Chest: Effort normal and breath sounds normal.   Abdomen: Soft, + BS, NT.    Assessment/Plan:  Gita Rendon is a(n) 39 y.o. female with MDD.     Chronic migraines.  Patient on topamax 150 mg twice daily, Nurtec 75 mg every other day, and Ajovy 225 MG/1.5ML auto-injector monthly (last does 1 week ago).  Intracranial HTN.  Continue Diamox 250 mg daily.  Allergic rhinitis.  Patient on Zyrtec 10 mg daily.   Dry eyes.  Patient on Cequa 0.09 % 1 drop twice daily (brought from home)  GERD.  Patient on Prilosec 20 mg twice daily.   Hypothyroidism.  Continue levothyroxine 112 mcg daily.   Hyperaldosteronism.  Continue spironolactone 25 mg daily.   Morbid obesity/prediabetes.  Hgb A1c 5.4% on 10/25/24.  Continue metformin 500 mg daily and Wegovy 1.7mg/0.75 mL weekly (brought from home).   Constipation.  Continue Linzess 290 mcg daily (brought from home) and colace 100 mg twice daily.   Chronic back pain.  Patient is on gabapentin 300 mg AM and 900 mg HS, Celebrex 200 mg twice daily.    Vitamin deficiency.  Continue daily supplements.  Vitamin B12 deficiency.  I will put the patient on vitamin B12 supplements.  "

## 2024-10-29 NOTE — NURSING NOTE
"Patient ha sbeen withdrawn to her room all evening.  States she was out of her room for most of the day but due to her negative thoughts had to return to her room this evening.  Patient says she frequently catches herself ruminating about things like how her mother did not love her \"properly\" and how she could have made different decisions that would have made her life turn out \"better\"  Says she often thinks about these same things at home until she winds up very depressed and non-functional. Pointed out to patient that she seems to be stuck in the past.  Encouraged patient to try to look forward instead of backwards and try to think of ways she can improve her future  "

## 2024-10-29 NOTE — PROGRESS NOTES
10/29/24    Team Meeting   Meeting Type Daily Rounds   Team Members Present   Team Members Present Physician;Nurse;;Occupational Therapist   Physician Team Member David COOL, Adria COOL, Aisha COOL, Yvette AHUJA   Nursing Team Member Nano CHRISTIAN   Social Work Team Member Javier CERRATOW, Isidro FLORESW   OT Team Member Pope PHYLLIS   Patient/Family Present   Patient Present No   Patient's Family Present No     201, reported wanting group home, able to make plans, comfortable here, insightful, possibly trial lithium for SI, meds adjusted, no dc date due to monitoring

## 2024-10-30 PROCEDURE — 99232 SBSQ HOSP IP/OBS MODERATE 35: CPT | Performed by: PSYCHIATRY & NEUROLOGY

## 2024-10-30 RX ORDER — BISACODYL 10 MG
10 SUPPOSITORY, RECTAL RECTAL DAILY PRN
Status: DISCONTINUED | OUTPATIENT
Start: 2024-10-30 | End: 2024-11-05 | Stop reason: HOSPADM

## 2024-10-30 RX ORDER — FLUVOXAMINE MALEATE 50 MG
25 TABLET ORAL
Status: DISCONTINUED | OUTPATIENT
Start: 2024-10-30 | End: 2024-10-31

## 2024-10-30 RX ORDER — DOCUSATE SODIUM 100 MG/1
200 CAPSULE, LIQUID FILLED ORAL DAILY
Status: DISCONTINUED | OUTPATIENT
Start: 2024-10-30 | End: 2024-11-05 | Stop reason: HOSPADM

## 2024-10-30 RX ORDER — POLYETHYLENE GLYCOL 3350 17 G/17G
17 POWDER, FOR SOLUTION ORAL DAILY
Status: DISCONTINUED | OUTPATIENT
Start: 2024-10-30 | End: 2024-11-05 | Stop reason: HOSPADM

## 2024-10-30 RX ORDER — DULOXETIN HYDROCHLORIDE 60 MG/1
120 CAPSULE, DELAYED RELEASE ORAL DAILY
Status: DISCONTINUED | OUTPATIENT
Start: 2024-10-31 | End: 2024-11-05 | Stop reason: HOSPADM

## 2024-10-30 RX ORDER — DOCUSATE SODIUM 100 MG/1
100 CAPSULE, LIQUID FILLED ORAL DAILY
Status: DISCONTINUED | OUTPATIENT
Start: 2024-10-31 | End: 2024-11-05 | Stop reason: HOSPADM

## 2024-10-30 RX ADMIN — B-COMPLEX W/ C & FOLIC ACID TAB 1 TABLET: TAB at 08:17

## 2024-10-30 RX ADMIN — PANTOPRAZOLE SODIUM 40 MG: 40 TABLET, DELAYED RELEASE ORAL at 08:17

## 2024-10-30 RX ADMIN — DOCUSATE SODIUM 100 MG: 100 CAPSULE, LIQUID FILLED ORAL at 08:17

## 2024-10-30 RX ADMIN — LINACLOTIDE 1 CAPSULE: 290 CAPSULE, GELATIN COATED ORAL at 08:46

## 2024-10-30 RX ADMIN — LORATADINE 10 MG: 10 TABLET ORAL at 08:17

## 2024-10-30 RX ADMIN — SEMAGLUTIDE 1.7 MG: 1.7 INJECTION, SOLUTION SUBCUTANEOUS at 08:48

## 2024-10-30 RX ADMIN — ACETAZOLAMIDE 500 MG: 250 TABLET ORAL at 08:17

## 2024-10-30 RX ADMIN — Medication 5000 UNITS: at 08:46

## 2024-10-30 RX ADMIN — CYCLOSPORINE 1 DROP: 0 SOLUTION/ DROPS OPHTHALMIC; TOPICAL at 08:46

## 2024-10-30 RX ADMIN — DOCUSATE SODIUM 200 MG: 100 CAPSULE, LIQUID FILLED ORAL at 17:18

## 2024-10-30 RX ADMIN — CARIPRAZINE 6 MG: 3 CAPSULE, GELATIN COATED ORAL at 08:17

## 2024-10-30 RX ADMIN — CYCLOSPORINE 1 DROP: 0 SOLUTION/ DROPS OPHTHALMIC; TOPICAL at 17:21

## 2024-10-30 RX ADMIN — CELECOXIB 200 MG: 100 CAPSULE ORAL at 17:17

## 2024-10-30 RX ADMIN — GABAPENTIN 900 MG: 600 TABLET, FILM COATED ORAL at 20:31

## 2024-10-30 RX ADMIN — CYANOCOBALAMIN TAB 500 MCG 500 MCG: 500 TAB at 08:17

## 2024-10-30 RX ADMIN — SPIRONOLACTONE 25 MG: 25 TABLET ORAL at 08:17

## 2024-10-30 RX ADMIN — CELECOXIB 200 MG: 100 CAPSULE ORAL at 08:17

## 2024-10-30 RX ADMIN — TOPIRAMATE 150 MG: 25 TABLET, FILM COATED ORAL at 08:17

## 2024-10-30 RX ADMIN — FLUVOXAMINE MALEATE 25 MG: 50 TABLET ORAL at 20:31

## 2024-10-30 RX ADMIN — FOLIC ACID 1 MG: 1 TABLET ORAL at 08:17

## 2024-10-30 RX ADMIN — METFORMIN HYDROCHLORIDE 500 MG: 500 TABLET ORAL at 08:17

## 2024-10-30 RX ADMIN — DULOXETINE HYDROCHLORIDE 60 MG: 60 CAPSULE, DELAYED RELEASE ORAL at 08:17

## 2024-10-30 RX ADMIN — PANTOPRAZOLE SODIUM 40 MG: 40 TABLET, DELAYED RELEASE ORAL at 17:18

## 2024-10-30 RX ADMIN — LEVOTHYROXINE SODIUM 112 MCG: 112 TABLET ORAL at 05:26

## 2024-10-30 RX ADMIN — POLYETHYLENE GLYCOL 3350 17 G: 17 POWDER, FOR SOLUTION ORAL at 12:50

## 2024-10-30 RX ADMIN — OXYBUTYNIN CHLORIDE 10 MG: 5 TABLET, EXTENDED RELEASE ORAL at 08:17

## 2024-10-30 RX ADMIN — GABAPENTIN 300 MG: 300 CAPSULE ORAL at 08:46

## 2024-10-30 RX ADMIN — TOPIRAMATE 150 MG: 25 TABLET, FILM COATED ORAL at 17:18

## 2024-10-30 RX ADMIN — ACETAZOLAMIDE 500 MG: 250 TABLET ORAL at 17:18

## 2024-10-30 NOTE — PROGRESS NOTES
"Progress Note - Gita Rendon 39 y.o. female MRN: [id number]    Unit/Bed#: [facility] 258-02 Encounter: [id number]        Subjective:   Patient seen and examined at bedside after reviewing the chart and discussing the case with the caring staff.      Patient examined at bedside.  Patient complaining of constipation and blood in stool.  Last BM this morning however small and hard.  Previous BM Sunday and same.  Patient states she forgets to ask for Miralax.  Also would like to avoid Senna as she was told by GI in the past to not take.     Physical Exam   Vitals: Blood pressure 127/84, pulse 104, temperature 97.8 °F (36.6 °C), temperature source Temporal, resp. rate 16, height 5' 8\" (1.727 m), weight (!) 146 kg (321 lb 12.8 oz), last menstrual period 10/15/2024, SpO2 96%.,Body mass index is 48.93 kg/m².  Constitutional: Patient in no acute distress.  HEENT: PERR, EOMI, MMM.  Cardiovascular: Normal rate and regular rhythm.    Pulmonary/Chest: Effort normal and breath sounds normal.   Abdomen: Soft, + BS, NT.    Assessment/Plan:  Gita Rendon is a(n) 39 y.o. female with MDD.     Chronic migraines.  Patient on topamax 150 mg twice daily, Nurtec 75 mg every other day, and Ajovy 225 MG/1.5ML auto-injector monthly (last does 1 week ago).  Intracranial HTN.  Continue Diamox 250 mg daily.  Allergic rhinitis.  Patient on Zyrtec 10 mg daily.   Dry eyes.  Patient on Cequa 0.09 % 1 drop twice daily (brought from home)  GERD.  Patient on Prilosec 20 mg twice daily.   Hypothyroidism.  Continue levothyroxine 112 mcg daily.   Hyperaldosteronism.  Continue spironolactone 25 mg daily.   Morbid obesity/prediabetes.  Hgb A1c 5.4% on 10/25/24.  Continue metformin 500 mg daily and Wegovy 1.7mg/0.75 mL weekly (brought from home).   Constipation.  Continue Linzess 290 mcg daily (brought from home), colace 100 mg AM and 200 mg PM.  Add Miralax daily 10/30.  Patient told by GI to avoid any Senna.  Dulcolax suppository as needed.   Chronic " back pain.  Patient is on gabapentin 300 mg AM and 900 mg HS, Celebrex 200 mg twice daily.    Vitamin deficiency.  Continue daily supplements.  Vitamin B12 deficiency.  Start patient on vitamin B12 supplements per Dr Liao 10/29.    The patient was discussed with Dr. Liao and he is in agreement with the above note.

## 2024-10-30 NOTE — NURSING NOTE
Patient visible and social with staff and peers. Pleasant and cooperative. Denies SI,HI,AVH, and anxiety. Does endorse depression. Safety checks continue Q 7 minutes.

## 2024-10-30 NOTE — ASSESSMENT & PLAN NOTE
Patient presenting with increased depression, thoughts of self-harm due to stressors of losses, loneliness and physical issues including headaches from ketamine therapy.    Plan:  Continue:  Change Cymbalta 120 mg PO QD  Vraylar 6 mg PO QD  Decrease Luvox 25 mg PO QHS  Topamax 150mg PO BID

## 2024-10-30 NOTE — PLAN OF CARE
Problem: Alteration in Thoughts and Perception  Goal: Attend and participate in unit activities, including therapeutic, recreational, and educational groups  Description: Interventions:  -Encourage Visitation and family involvement in care  Outcome: Progressing     Problem: Ineffective Coping  Goal: Participates in unit activities  Description: Interventions:  - Provide therapeutic environment   - Provide required programming   - Redirect inappropriate behaviors   Outcome: Progressing     Problem: Depression  Goal: Attend and participate in unit activities, including therapeutic, recreational, and educational groups  Description: Interventions:  - Provide therapeutic and educational activities daily, encourage attendance and participation, and document same in the medical record   Outcome: Progressing   She participates in groups

## 2024-10-30 NOTE — PROGRESS NOTES
"Progress Note - Behavioral Health   Name: Gita Rendon 39 y.o. female I MRN: [id number]  Unit/Bed#: -02 I Date of Admission: 10/24/2024   Date of Service: 10/30/2024 I Hospital Day: 6    Assessment & Plan  Severe episode of recurrent major depressive disorder, without psychotic features (HCC)  Patient presenting with increased depression, thoughts of self-harm due to stressors of losses, loneliness and physical issues including headaches from ketamine therapy.    Plan:  Continue:  Change Cymbalta 120 mg PO QD  Vraylar 6 mg PO QD  Decrease Luvox 25 mg PO QHS  Topamax 150mg PO BID  EILEEN (generalized anxiety disorder)  Continue Gabapentin 300mg PO QD and 900mg PO QHS    Progress Toward Goals: Some improvement.  Maintaining safety with no return of SI.  Thoughts remain negative, circumstantial, and ruminating.  Anxiety persists but depression improving.  Patient reports significant improvement with depression after starting Cymbalta in the outpatient setting; change Cymbalta 120mg PO QD.  Taper Luvox to discontinuation to reduce polypharmacy, duplicate antidepressant therapy, and decreased risk of medication interactions with NSAIDs.  Consider further titration of gabapentin for anxiety and mood control.  Perhaps patient would benefit from PHP upon discharge.  No discharge date at this time.    Recommended Treatment: Continue with group therapy, milieu therapy and occupational therapy.      Risks, benefits and possible side effects of Medications:   Risks, benefits, and possible side effects of medications explained to patient and patient verbalizes understanding.      History of Present Illness   Behavior over the last 24 hours:  improved  Sleep: normal  Appetite: normal  Medication side effects: No  ROS:  Occasional headaches and all other systems are negative    Subjective: Elizabeth is visible in the milieu, social with peers, and attends group.  Calm and cooperative during encounter.  States she feels \"better\" " "in the hospital than at home where she is alone.  Endorses a lot of worry and ruminations regarding her car, weight, and poor motivation; \"it's just too much and I can't do anything.\"  States she feels depressed because of being anxious.  Thoughts were negative and circumstantial.  Has been maintaining safety on unit with no return of SI.  States she often feels hopeless and helpless when she is home alone; believes she may benefit from a residential program.  Responded well to support provided and encouraged to come up with a routine that she is able to implement here and transition to home upon discharge.  Has been maintaining ADLs and adequate sleep.  Denies any side effects of medications.    Objective   Mental Status Evaluation:  Appearance:  casually dressed, overweight, and wearing glasses   Behavior:  Cooperative, superficial, somewhat guarded   Speech:  normal pitch and normal volume   Mood:  anxious   Affect:  blunted   Thought Process:  circumstantial   Associations: circumstantial associations   Thought Content:  Negative and ruminating   Perceptual Disturbances: Denied AVH, did not appear internally preoccupied    Risk Potential: Suicidal Ideations none at present   Homicidal Ideations none  Potential for Aggression No   Sensorium:  person, place, time/date, and situation   Memory:  recent and remote memory grossly intact   Consciousness:  alert and awake    Attention: attention span and concentration were age appropriate   Insight:  limited   Judgment: limited   Gait/Station: normal gait/station and normal balance   Motor Activity: no abnormal movements     Medications: all current active meds have been reviewed and continue current psychiatric medications.      Lab Results: I have reviewed the following results:  Drug Screen:   Lab Results   Component Value Date    AMPMETHUR Negative 10/23/2024    BARBTUR Negative 10/23/2024    BDZUR Negative 10/23/2024    THCUR Negative 10/23/2024    COCAINEUR " Negative 10/23/2024    METHADONEUR Negative 10/23/2024    OPIATEUR Negative 10/23/2024    PCPUR Negative 10/23/2024

## 2024-10-30 NOTE — NURSING NOTE
Patient pleasant and cooperative with staff. Denies SI/HI or AVH. Reports ongoing depression and increase in anxiety; would not elaborate on cause. Patient visible and selectively social on the unit; attending groups. Patient med compliant and making needs known. Will continue to monitor. Continual q15 min rounding and safety checks in place.

## 2024-10-30 NOTE — PROGRESS NOTES
10/30/24    Team Meeting   Meeting Type Daily Rounds   Team Members Present   Team Members Present Physician;Nurse;;Occupational Therapist   Physician Team Member David COOL, Adria COOL, Aisha Cool, Zina AHUJA   Nursing Team Member Neeta CHRISTIAN   Social Work Team Member Javier MOSS   OT Team Member Pope PHYLLIS   Patient/Family Present   Patient Present No   Patient's Family Present No     201, ruminating on the past, finances, medical issues, more social, brightens on approach, endorses dep, pleasant and cooperative, med compliant, meds monitoring

## 2024-10-31 ENCOUNTER — TELEPHONE (OUTPATIENT)
Age: 39
End: 2024-10-31

## 2024-10-31 PROBLEM — Z00.8 MEDICAL CLEARANCE FOR PSYCHIATRIC ADMISSION: Status: RESOLVED | Noted: 2024-07-05 | Resolved: 2024-10-31

## 2024-10-31 PROCEDURE — 99232 SBSQ HOSP IP/OBS MODERATE 35: CPT | Performed by: PSYCHIATRY & NEUROLOGY

## 2024-10-31 RX ORDER — FLUVOXAMINE MALEATE 50 MG
25 TABLET ORAL
Status: COMPLETED | OUTPATIENT
Start: 2024-10-31 | End: 2024-11-01

## 2024-10-31 RX ORDER — ECHINACEA PURPUREA EXTRACT 125 MG
1 TABLET ORAL 2 TIMES DAILY
Status: DISCONTINUED | OUTPATIENT
Start: 2024-10-31 | End: 2024-11-05 | Stop reason: HOSPADM

## 2024-10-31 RX ORDER — SODIUM CHLORIDE/ALOE VERA
1 GEL (GRAM) NASAL 2 TIMES DAILY
Status: DISCONTINUED | OUTPATIENT
Start: 2024-10-31 | End: 2024-11-05 | Stop reason: HOSPADM

## 2024-10-31 RX ADMIN — DOCUSATE SODIUM 200 MG: 100 CAPSULE, LIQUID FILLED ORAL at 18:29

## 2024-10-31 RX ADMIN — OXYBUTYNIN CHLORIDE 10 MG: 5 TABLET, EXTENDED RELEASE ORAL at 08:48

## 2024-10-31 RX ADMIN — CELECOXIB 200 MG: 100 CAPSULE ORAL at 08:47

## 2024-10-31 RX ADMIN — LEVOTHYROXINE SODIUM 112 MCG: 112 TABLET ORAL at 05:50

## 2024-10-31 RX ADMIN — ACETAZOLAMIDE 500 MG: 250 TABLET ORAL at 18:29

## 2024-10-31 RX ADMIN — SALINE NASAL SPRAY 1 SPRAY: 1.5 SOLUTION NASAL at 13:01

## 2024-10-31 RX ADMIN — GABAPENTIN 300 MG: 300 CAPSULE ORAL at 08:48

## 2024-10-31 RX ADMIN — METFORMIN HYDROCHLORIDE 500 MG: 500 TABLET ORAL at 08:47

## 2024-10-31 RX ADMIN — PANTOPRAZOLE SODIUM 40 MG: 40 TABLET, DELAYED RELEASE ORAL at 18:29

## 2024-10-31 RX ADMIN — ACETAZOLAMIDE 500 MG: 250 TABLET ORAL at 08:48

## 2024-10-31 RX ADMIN — CYANOCOBALAMIN TAB 500 MCG 500 MCG: 500 TAB at 08:47

## 2024-10-31 RX ADMIN — DULOXETINE HYDROCHLORIDE 120 MG: 60 CAPSULE, DELAYED RELEASE ORAL at 08:47

## 2024-10-31 RX ADMIN — CYCLOSPORINE 1 DROP: 0 SOLUTION/ DROPS OPHTHALMIC; TOPICAL at 18:29

## 2024-10-31 RX ADMIN — DOCUSATE SODIUM 100 MG: 100 CAPSULE, LIQUID FILLED ORAL at 08:47

## 2024-10-31 RX ADMIN — SPIRONOLACTONE 25 MG: 25 TABLET ORAL at 08:48

## 2024-10-31 RX ADMIN — GABAPENTIN 900 MG: 600 TABLET, FILM COATED ORAL at 21:51

## 2024-10-31 RX ADMIN — Medication 5000 UNITS: at 08:47

## 2024-10-31 RX ADMIN — LORATADINE 10 MG: 10 TABLET ORAL at 08:48

## 2024-10-31 RX ADMIN — CARIPRAZINE 6 MG: 3 CAPSULE, GELATIN COATED ORAL at 08:48

## 2024-10-31 RX ADMIN — FOLIC ACID 1 MG: 1 TABLET ORAL at 08:48

## 2024-10-31 RX ADMIN — LINACLOTIDE 1 CAPSULE: 290 CAPSULE, GELATIN COATED ORAL at 08:48

## 2024-10-31 RX ADMIN — CYCLOSPORINE 1 DROP: 0 SOLUTION/ DROPS OPHTHALMIC; TOPICAL at 08:46

## 2024-10-31 RX ADMIN — Medication 1 APPLICATION: at 14:37

## 2024-10-31 RX ADMIN — RIMEGEPANT SULFATE 75 MG: 75 TABLET, ORALLY DISINTEGRATING ORAL at 08:48

## 2024-10-31 RX ADMIN — POLYETHYLENE GLYCOL 3350 17 G: 17 POWDER, FOR SOLUTION ORAL at 08:47

## 2024-10-31 RX ADMIN — CELECOXIB 200 MG: 100 CAPSULE ORAL at 18:29

## 2024-10-31 RX ADMIN — TOPIRAMATE 150 MG: 25 TABLET, FILM COATED ORAL at 18:29

## 2024-10-31 RX ADMIN — FLUVOXAMINE MALEATE 25 MG: 50 TABLET ORAL at 21:51

## 2024-10-31 RX ADMIN — PANTOPRAZOLE SODIUM 40 MG: 40 TABLET, DELAYED RELEASE ORAL at 08:48

## 2024-10-31 RX ADMIN — B-COMPLEX W/ C & FOLIC ACID TAB 1 TABLET: TAB at 08:48

## 2024-10-31 RX ADMIN — TOPIRAMATE 150 MG: 25 TABLET, FILM COATED ORAL at 08:48

## 2024-10-31 NOTE — ASSESSMENT & PLAN NOTE
Patient presenting with increased depression, thoughts of self-harm due to stressors of losses, loneliness and physical issues including headaches from ketamine therapy.    Plan:  Continue:  Continue Cymbalta 120 mg PO QD  Vraylar 6 mg PO QD  Continue Luvox 25 mg PO QHS taper x 2 doses then discontinue  Topamax 150mg PO BID

## 2024-10-31 NOTE — NURSING NOTE
Patient noted to be visible on the milieu and minimally social w/ peers on approach. Denied SI/HI/AVH. Continues to endorse ongoing depression and anxiety. Minimal during interaction. Compliant w/ hs med regimen. Q 15 min safety checks continuous and maintained.

## 2024-10-31 NOTE — PROGRESS NOTES
10/31/24    Team Meeting   Meeting Type Daily Rounds   Team Members Present   Team Members Present Physician;Nurse;;Occupational Therapist   Physician Team Member David COOL, Adria COOL, Aisha COOL, Zina AHUJA   Nursing Team Member Crys CHRISTIAN   Social Work Team Member Javier MOSS   OT Team Member Pope PHYLLIS   Patient/Family Present   Patient Present No   Patient's Family Present No     201, minimal, moderate anx/dep, no SI while here, ruminating, meds adjusted, no dc date due to med management and monitoring

## 2024-10-31 NOTE — NURSING NOTE
Patient med and meal compliant visible on unit social with peers calm flat cooperative. Denies SI HI AVH at this time Safety checks maintained

## 2024-10-31 NOTE — PLAN OF CARE
Problem: Alteration in Thoughts and Perception  Goal: Verbalize thoughts and feelings  Description: Interventions:  - Promote a nonjudgmental and trusting relationship with the patient through active listening and therapeutic communication  - Assess patient's level of functioning, behavior and potential for risk  - Engage patient in 1 on 1 interactions  - Encourage patient to express fears, feelings, frustrations, and discuss symptoms    - [place] patient to reality, help patient recognize reality-based thinking   - Administer medications as ordered and assess for potential side effects  - Provide the patient education related to the signs and symptoms of the illness and desired effects of prescribed medications  Outcome: Progressing  Goal: Refrain from acting on delusional thinking/internal stimuli  Description: Interventions:  - Monitor patient closely, per order   - Utilize least restrictive measures   - Set reasonable limits, give positive feedback for acceptable   - Administer medications as ordered and monitor of potential side effects  Outcome: Progressing     Problem: Ineffective Coping  Goal: Understands least restrictive measures  Description: Interventions:  - Utilize least restrictive behavior  Outcome: Progressing     Problem: Risk for Self Injury/Neglect  Goal: Treatment Goal: Remain safe during length of stay, learn and adopt new coping skills, and be free of self-injurious ideation, impulses and acts at the time of discharge  Outcome: Progressing

## 2024-10-31 NOTE — TELEPHONE ENCOUNTER
Patient called inquiring if office has received forms from [employer] for urinary supplies.     Patient states company has been attempting to fax form since July.     Patient stated when she is discharge from the facility she is in she will print out and bring in forms.     Please advise  Thank you

## 2024-10-31 NOTE — PROGRESS NOTES
Progress Note - Behavioral Health   Name: Gita Rendon 39 y.o. female I MRN: [id number]  Unit/Bed#: -02 I Date of Admission: 10/24/2024   Date of Service: 10/31/2024 I Hospital Day: 7    Assessment & Plan  Severe episode of recurrent major depressive disorder, without psychotic features (HCC)  Patient presenting with increased depression, thoughts of self-harm due to stressors of losses, loneliness and physical issues including headaches from ketamine therapy.    Plan:  Continue:  Continue Cymbalta 120 mg PO QD  Vraylar 6 mg PO QD  Continue Luvox 25 mg PO QHS taper x 2 doses then discontinue  Topamax 150mg PO BID  EILEEN (generalized anxiety disorder)  Continue Gabapentin 300mg PO QD and 900mg PO QHS    Progress Toward Goals: Slowly improving.  Maintaining safety with no return of SI.  Depression also improving.  Anxiety, ruminations, and obsessions persist.  Plan is to continue Luvox taper x 2 more doses then discontinue to reduce duplicate antidepressant/anxiolytic therapy, reduce polypharmacy, and decreased risk of gastric bleeding with combination NSAIDs.  Plan is to also collaborate with medical team tomorrow and attempt to reduce polypharmacy.  We will continue remainder of psychotropic regimen as ordered.  Will also discuss possibility of PHP as stepdown to discharge.    Recommended Treatment: Continue with group therapy, milieu therapy and occupational therapy.      Risks, benefits and possible side effects of Medications:   Risks, benefits, and possible side effects of medications explained to patient and patient verbalizes understanding.      History of Present Illness   Behavior over the last 24 hours:  improved  Sleep: normal  Appetite: normal  Medication side effects: No  ROS: no complaints and all other systems are negative    Subjective: Elizabeth remains visible and engaged in milieu activities.  Reports improving depression but continues to endorse ongoing obsessions and ruminations regarding  chores she should be doing at home.  States she often has intrusive thoughts about this but has no energy to carry out the task.  Maintaining safety with no return of SI.  Mood incongruent with affect.  Receptive to medication education.  States headaches are triggers for her suicidal ideation and feels there is no point for her living when she has having migraines.    Objective   Mental Status Evaluation:  Appearance:  overweight and wearing glasses, casually dressed   Behavior:  Cooperative, calm   Speech:  tangential   Mood:  anxious and depressed   Affect:  constricted and mood-incongruent   Thought Process:  circumstantial   Associations: circumstantial associations   Thought Content:  Negative, intrusive, obsessive, and ruminating thoughts   Perceptual Disturbances: Denied AVH, did not appear internally preoccupied   Risk Potential: Suicidal Ideations none at present  Homicidal Ideations none  Potential for Aggression No   Sensorium:  person, place, time/date, and situation   Memory:  recent and remote memory grossly intact   Consciousness:  alert and awake    Attention: attention span and concentration were age appropriate   Insight:  limited   Judgment: limited   Gait/Station: normal gait/station and normal balance   Motor Activity: no abnormal movements     Medications: all current active meds have been reviewed and continue current psychiatric medications.      Lab Results: I have reviewed the following results:  CMP:   Lab Results   Component Value Date    SODIUM 140 10/23/2024    K 3.7 10/23/2024     (H) 10/23/2024    CO2 21 10/23/2024    AGAP 7 10/23/2024    BUN 28 (H) 10/23/2024    CREATININE 0.75 10/23/2024    GLUC 90 10/23/2024    GLUF 96 10/15/2024    CALCIUM 9.0 10/23/2024    AST 24 10/09/2024    ALT 26 10/09/2024    ALKPHOS 86 10/09/2024    TP 6.8 10/09/2024    ALB 4.1 10/09/2024    TBILI 0.32 10/09/2024    EGFR 100 10/23/2024

## 2024-10-31 NOTE — DISCHARGE INSTR - OTHER ORDERS
You are being discharged to [address] [place] [address] [address], [facility] [address]. Patient phone: [phone]     Triggers you have identified during your hospitalization that led to your admission include a distressed mood and ineffective coping skills. Coping skills you have identified during your hospitalization include music and art therapy. If you are unable to deal with your distressed mood alone please contact Vladimir ([phone]). If that is not effective and you continue to have a distressed mood, are overwhelmed, or are in crisis) please contact (Crisis #) [facility] , dial [phone] or go to the nearest emergency center.      *[facility] Crisis Helpline: [phone]  *[place] Suicide Prevention Lifeline:  1-175.273.2957  *Alcohol Anonymous: 996.943.6206  *National [place] on Mental Illness (INGE) HELPLINE: 191.290.6502/Website: www.inge.org  *Substance Abuse and Mental Health Services Administration([facility]) National Helpline, which is a confidential, free, 24-hour-a-day, 365-day-a-year, information service for individuals and family members facing mental health and/or substance use disorders. This service provides referrals to local treatment facilities, support groups, and community-based organizations. Callers can also order free publications and other information.  Call 1-804.343.5560/Website: www.[facility].gov  *[facility] 2-1-1: This is a toll free, confidential, 24-hour-a-day service which connects you to a community  in your area who can help you find services and resources that are available to you locally and provide critical services that can improve and save lives.  Call: 211  /Website: http://www.[employer].org/         Reina, or Kimberly, our Behavioral Health Nurse Navigators, will be calling you after your discharge, on the phone number that you provided.  They will be available as an additional support, if needed.   If you wish to speak with one of them, you may  contact Reina at [phone] or Kimberly at [phone].      [place] [facility]  Mailing Address  [address] [place] [address]  [address]  [place] [address]     Phone: [phone]  Fax: [phone]     Toll Free Phone Number  [phone]     Hours  Monday - Friday  8 a.m. - 4:30 p.m.        Mental Health - Intake, Evaluation and Referral  Intake (Mental Health)  This is the first contact of a consumer with an  who assists each person or family seeking services to identify the presenting problem, complete necessary social, familial, and medical to develop an initial service plan. Payment for this service is for those consumers who have been determined to be ineligible for other insurances.  For Adults:  [facility]: [phone]  [facility]: [phone]  [facility]: [phone]  For Children and Adolescents:  [facility]: [phone]  [facility]: [phone]  [facility]: [phone]  [facility]: [phone]    Forensic Services  Specialty Court- Specialized treatment court within [facility] that identifies and intercepts non-violent adult offenders with mental illness or a co-occurring disorder.     Forensic Assertive Community Treatment Team - Provides comprehensive evidence-based treatment to high risk individuals with a criminal background, severe mental illness as well as Drug and Alcohol issues.  For more information, please contact [facility] at [phone].  half-way Re-Entry Program - Provides life skills counseling to inmates with mental illness and prepares them for re-entry to the community.  For more  information, please contact [facility] at [phone].   Mental Health Coordinator - Domenic Stevens PsyD, overees and coordinates daily operations of the [facility] Mental Health Court.  IMED (Intercept Model for Early Intervention) - Provides services to those 18 years and older with Medicaid who have come to the attention of the Criminal Justice System via police and/or magisterial involvement.  For more information, please contact [facility] at [phone].  For more information, please contact:  Yong Mcfarland, Mental Health  - Adult Services  [phone]  [email or web address]      Adult Mental Health Services  Suicide Prevention - Suicide is a public health issue; no one is immune to its effects. There are steps everyone can take to reduce suicide risk among your family, friends, co-workers and even strangers in your community. Information is available to help individuals understand why suicide prevention is important for everyone. Everyone can learn how to identify the warning signs for those who may be at risk for death by suicide. [facility], in cooperation with community providers, hold a Suicide Prevention Committee meetings monthly, a calendar is available. The Committee is called, the Zero Suicide Initiative Committee. The committee focus is connect, provide support and to bring awareness education resources to [facility] residents and community stakeholders.  If you or someone you know is experiencing suicidal thoughts, please call [phone]. Locally residents can access 24/7 telephone crisis support through helpline at [phone].    Assertive Community Treatment (ACT) Team - Targeted to meet the needs of individuals with serious and persistent mental illness 18 years old and above, as well as drug and alcohol concerns, engaging in typical mental health  "services and are at risk for re-hospitalization and homelessness.  Blended Case Management - These case management services provide a \"bridging\" of Intensive Case Management and Resource Coordination whereby consumers may move from one level of service to another level of service without compromising continuity of care.  Community (Supported) Employment Services - This evidenced based practice in a community setting or employment-related programs which may combine vocational evaluation, vocational training and employment in a non-specialized setting such as a business or industry or other work sites within the community.  Crisis Services - Immediate, crisis-oriented services designed to ameliorate or resolve precipitating stress, which are provided to adults or children and their families who exhibit an acute problem of disturbed thought, behavior, mood, or social relationships.  The services provide rapid response to crisis situations which threaten the well-being of the individual or others.  Drop-In Centers in [facility] - These services assist people develop peer support, participate in social activities and develop recreational activities that allow and support recovery from mental illness.  Master Leasing Program - Designed to serve Medical Assistance (MA)-eligible/MA ineligible people 18 years of age and older with serious and persistent mental illness or substance abuse issues who are able to live independently in the community if they have support staff for monitoring and/or assisting with residential responsibilities.  This traditionally underserved population has become highly represented in [facility] senior living population and requires housing opportunities not previously made available due to legal difficulties.  Outpatient Mental Health Services - These are clinic based, treatment oriented services provided to individuals (children, adolescents and adults) or his/her families suffering from a " diagnosed mental health disorder.  Peer Specialist Services (Certified) - The Peer Specialists act as a role model for individuals receiving Mental Health services and works to form relationships with consumers in order to better understand and communicate needs pertaining to recovery goals and developing community supports.  Psychiatric Rehabilitation Services - These services assist adults, 18 years and older, to develop, enhance or retain psychiatric stability, social competency, personal adjustment and independent living so they may experience recovery from symptoms of mental illness.  For more information, please contact:  Yong Mcfarland, Mental Health  - Adult Services  [phone]  [email or web address]

## 2024-10-31 NOTE — PROGRESS NOTES
"Progress Note - Gita Rendon 39 y.o. female MRN: [id number]    Unit/Bed#: [facility] 258-02 Encounter: [id number]        Subjective:   Patient seen and examined at bedside after reviewing the chart and discussing the case with the caring staff.      Patient examined at bedside.  Patient complaining of dry nose/sinuses.  Patient uses nasal lavage at home.  She would like nasal spray and gel while here.     Physical Exam   Vitals: Blood pressure 133/64, pulse 77, temperature 97.8 °F (36.6 °C), temperature source Temporal, resp. rate 18, height 5' 8\" (1.727 m), weight (!) 146 kg (321 lb 12.8 oz), last menstrual period 10/15/2024, SpO2 96%.,Body mass index is 48.93 kg/m².  Constitutional: Patient in no acute distress.  HEENT: PERR, EOMI, MMM.  Cardiovascular: Normal rate and regular rhythm.    Pulmonary/Chest: Effort normal and breath sounds normal.   Abdomen: Soft, + BS, NT.    Assessment/Plan:  Gita Rendon is a(n) 39 y.o. female with MDD.     Chronic migraines.  Patient on topamax 150 mg twice daily, Nurtec 75 mg every other day, and Ajovy 225 MG/1.5ML auto-injector monthly (last does 1 week ago).  Intracranial HTN.  Continue Diamox 250 mg daily.  Allergic rhinitis.  Patient on Zyrtec 10 mg daily.   Dry eyes.  Patient on Cequa 0.09 % 1 drop twice daily (brought from home)  GERD.  Patient on Prilosec 20 mg twice daily.   Hypothyroidism.  Continue levothyroxine 112 mcg daily.   Hyperaldosteronism.  Continue spironolactone 25 mg daily.   Morbid obesity/prediabetes.  Hgb A1c 5.4% on 10/25/24.  Continue metformin 500 mg daily and Wegovy 1.7mg/0.75 mL weekly (brought from home).   Constipation.  Continue Linzess 290 mcg daily (brought from home), colace 100 mg AM and 200 mg PM.  Add Miralax daily 10/30.  Patient told by GI to avoid any Senna.  Dulcolax suppository as needed.   Chronic back pain.  Patient is on gabapentin 300 mg AM and 900 mg HS, Celebrex 200 mg twice daily.    Vitamin deficiency.  Continue daily " supplements.  Vitamin B12 deficiency.  Start patient on vitamin B12 supplements per Dr Liao 10/29.  Dry nose.  Saline nasal spray and Ayr gel scheduled twice daily.     The patient was discussed with Dr. Liao and he is in agreement with the above note.

## 2024-11-01 PROCEDURE — 99232 SBSQ HOSP IP/OBS MODERATE 35: CPT | Performed by: PSYCHIATRY & NEUROLOGY

## 2024-11-01 RX ADMIN — TOPIRAMATE 150 MG: 25 TABLET, FILM COATED ORAL at 08:58

## 2024-11-01 RX ADMIN — ACETAZOLAMIDE 500 MG: 250 TABLET ORAL at 17:40

## 2024-11-01 RX ADMIN — PANTOPRAZOLE SODIUM 40 MG: 40 TABLET, DELAYED RELEASE ORAL at 17:40

## 2024-11-01 RX ADMIN — CYCLOSPORINE 1 DROP: 0 SOLUTION/ DROPS OPHTHALMIC; TOPICAL at 12:04

## 2024-11-01 RX ADMIN — Medication 1 APPLICATION: at 09:15

## 2024-11-01 RX ADMIN — GABAPENTIN 900 MG: 600 TABLET, FILM COATED ORAL at 20:55

## 2024-11-01 RX ADMIN — METFORMIN HYDROCHLORIDE 500 MG: 500 TABLET ORAL at 09:02

## 2024-11-01 RX ADMIN — SPIRONOLACTONE 25 MG: 25 TABLET ORAL at 08:58

## 2024-11-01 RX ADMIN — Medication 1 APPLICATION: at 20:58

## 2024-11-01 RX ADMIN — LEVOTHYROXINE SODIUM 112 MCG: 112 TABLET ORAL at 06:10

## 2024-11-01 RX ADMIN — LORATADINE 10 MG: 10 TABLET ORAL at 08:56

## 2024-11-01 RX ADMIN — CELECOXIB 200 MG: 100 CAPSULE ORAL at 08:55

## 2024-11-01 RX ADMIN — POLYETHYLENE GLYCOL 3350 17 G: 17 POWDER, FOR SOLUTION ORAL at 09:15

## 2024-11-01 RX ADMIN — CELECOXIB 200 MG: 100 CAPSULE ORAL at 17:41

## 2024-11-01 RX ADMIN — SALINE NASAL SPRAY 1 SPRAY: 1.5 SOLUTION NASAL at 09:15

## 2024-11-01 RX ADMIN — OXYBUTYNIN CHLORIDE 10 MG: 5 TABLET, EXTENDED RELEASE ORAL at 08:55

## 2024-11-01 RX ADMIN — LINACLOTIDE 1 CAPSULE: 290 CAPSULE, GELATIN COATED ORAL at 09:15

## 2024-11-01 RX ADMIN — DOCUSATE SODIUM 100 MG: 100 CAPSULE, LIQUID FILLED ORAL at 08:56

## 2024-11-01 RX ADMIN — Medication 5000 UNITS: at 08:55

## 2024-11-01 RX ADMIN — FOLIC ACID 1 MG: 1 TABLET ORAL at 08:55

## 2024-11-01 RX ADMIN — CYANOCOBALAMIN TAB 500 MCG 500 MCG: 500 TAB at 08:56

## 2024-11-01 RX ADMIN — CYCLOSPORINE 1 DROP: 0 SOLUTION/ DROPS OPHTHALMIC; TOPICAL at 17:47

## 2024-11-01 RX ADMIN — TOPIRAMATE 150 MG: 25 TABLET, FILM COATED ORAL at 17:40

## 2024-11-01 RX ADMIN — SALINE NASAL SPRAY 1 SPRAY: 1.5 SOLUTION NASAL at 20:58

## 2024-11-01 RX ADMIN — FLUVOXAMINE MALEATE 25 MG: 50 TABLET ORAL at 20:55

## 2024-11-01 RX ADMIN — DOCUSATE SODIUM 200 MG: 100 CAPSULE, LIQUID FILLED ORAL at 17:40

## 2024-11-01 RX ADMIN — ACETAZOLAMIDE 500 MG: 250 TABLET ORAL at 08:55

## 2024-11-01 RX ADMIN — GABAPENTIN 300 MG: 300 CAPSULE ORAL at 08:55

## 2024-11-01 RX ADMIN — B-COMPLEX W/ C & FOLIC ACID TAB 1 TABLET: TAB at 08:55

## 2024-11-01 RX ADMIN — PANTOPRAZOLE SODIUM 40 MG: 40 TABLET, DELAYED RELEASE ORAL at 08:56

## 2024-11-01 RX ADMIN — HYDROXYZINE HYDROCHLORIDE 100 MG: 50 TABLET, FILM COATED ORAL at 09:58

## 2024-11-01 RX ADMIN — CARIPRAZINE 6 MG: 3 CAPSULE, GELATIN COATED ORAL at 08:55

## 2024-11-01 RX ADMIN — DULOXETINE HYDROCHLORIDE 120 MG: 60 CAPSULE, DELAYED RELEASE ORAL at 08:55

## 2024-11-01 NOTE — QUICK NOTE
Was contacted by medicine team regarding this patient in an attempt to help reduce polypharmacy. Psychiatry team feels that polypharmacy may be contributing to her depression. She is taking spironolactone 25 mg daily which had been started for hyperaldosteronism and recently had the dose reduced from 50 mg due to hypotension. Due to her blood pressures being on the low side, and at points in the past complaining of symptomatic hypotension, I think it is reasonable at this point to discontinue this medication and see how she tolerates. We can also revisit checking another renin/camila ratio as an outpatient in about 1 month to further evaluate for hyperaldosteronism. Regarding the diamox, this would be up to the discretion of neurology as this is currently being used to manage pseudotumor cerebri.

## 2024-11-01 NOTE — ASSESSMENT & PLAN NOTE
Patient presenting with increased depression, thoughts of self-harm due to stressors of losses, loneliness and physical issues including headaches from ketamine therapy.    Plan:  Continue:  Cymbalta 120 mg PO QD  Vraylar 6 mg PO QD  Discontinue Luvox 25 mg after tonight's dose  Topamax 150mg PO BID

## 2024-11-01 NOTE — NURSING NOTE
Patient  visible on milieu.Flat affect.Pleasant and cooperative.Schedule PO medication administered as ordered.Denies hallucination, HI, SI.  Appetite good.Attend group.Continue on safety check

## 2024-11-01 NOTE — PROGRESS NOTES
"Progress Note - Gita Rendon 39 y.o. female MRN: [id number]    Unit/Bed#: [facility] 258-02 Encounter: [id number]        Subjective:   Patient seen and examined at bedside after reviewing the chart and discussing the case with the caring staff.      Patient examined at bedside.  Patient denies any acute symptoms.     Psych believes polypharmacy may be reason for depression.  Looking to d/c any unnecessary medications.  Will d/c vitamins.  Nephrology messaged regarding Diamox and spironolactone.     Physical Exam   Vitals: Blood pressure 115/55, pulse 71, temperature 97.8 °F (36.6 °C), temperature source Temporal, resp. rate 18, height 5' 8\" (1.727 m), weight (!) 146 kg (321 lb 12.8 oz), last menstrual period 10/15/2024, SpO2 96%.,Body mass index is 48.93 kg/m².  Constitutional: Patient in no acute distress.  HEENT: PERR, EOMI, MMM.  Cardiovascular: Normal rate and regular rhythm.    Pulmonary/Chest: Effort normal and breath sounds normal.   Abdomen: Soft, + BS, NT.    Assessment/Plan:  Gita Rendon is a(n) 39 y.o. female with MDD.     Chronic migraines.  Patient on topamax 150 mg twice daily, Nurtec 75 mg every other day, and Ajovy 225 MG/1.5ML auto-injector monthly (last does 1 week ago).  Intracranial HTN.  Continue Diamox 250 mg daily. Patient follows with [place] [facility].   Allergic rhinitis.  Patient on Zyrtec 10 mg daily.   Dry eyes.  Patient on Cequa 0.09 % 1 drop twice daily (brought from home)  GERD.  Patient on Prilosec 20 mg twice daily.   Hypothyroidism.  Continue levothyroxine 112 mcg daily.   Hyperaldosteronism.  Continue spironolactone 25 mg daily.   Morbid obesity/prediabetes.  Hgb A1c 5.4% on 10/25/24.  Continue metformin 500 mg daily and Wegovy 1.7mg/0.75 mL weekly (brought from home).   Constipation.  Continue Linzess 290 mcg daily (brought from home), colace 100 mg AM and 200 mg PM.  Add Miralax daily 10/30.  Patient told by GI to avoid any Senna.  Dulcolax suppository as needed.   Chronic " back pain.  Patient is on gabapentin 300 mg AM and 900 mg HS, Celebrex 200 mg twice daily.    Vitamin deficiency.  Continue daily supplements.  Vitamin B12 deficiency.  Start patient on vitamin B12 supplements per Dr Liao 10/29.  Dry nose.  Saline nasal spray and Ayr gel scheduled twice daily.     The patient was discussed with Dr. Liao and he is in agreement with the above note.

## 2024-11-01 NOTE — SOCIAL WORK
SW met with pt at pt's request. Pt wanted to discuss dc planning, would rather not attend PHP due to the distance she would have to drive and not wanting virtual PHP. PT reported her CM through [facility] suggested psych rehab for pt. SW will update treatment team and inquire about a referral.   SW will call CM to discuss dc planning.

## 2024-11-01 NOTE — TREATMENT TEAM
10/25/24 0857   Provider Notification   Reason for Communication Other (Comment)  (Medication  Reconciliation)   Provider Name Dr Alaniz   Provider Role Attending physician   Method of Communication Other (Comment)  (Epic  message)   Response No new orders   Notification Time 0836       
   10/25/24 1442   Brothers Anxiety Scale   Anxious Mood 4   Tension 4   Fears 4   Insomnia 0   Intellectual 4   Depressed Mood 4   Somatic Complaints: Muscular 0   Somatic Complaints: Sensory 0   Cardiovascular Symptoms 0   Respiratory Symptoms 0   Gastrointestinal Symptoms 0   Genitourinary Symptoms 0   Autonomic Symptoms 4   Behavior at Interview 4   Brothers Anxiety Score 28     Patient report anxiety related to her issue living  issues. Nurse provide emotional support. Administered  Atarax  100 mg  .  
   10/27/24 1216   Brothers Anxiety Scale   Anxious Mood 4   Tension 4   Fears 4   Insomnia 0   Intellectual 4   Depressed Mood 4   Somatic Complaints: Muscular 0   Somatic Complaints: Sensory 0   Cardiovascular Symptoms 0   Respiratory Symptoms 0   Gastrointestinal Symptoms 0   Genitourinary Symptoms 0   Autonomic Symptoms 4   Behavior at Interview 4   Brothers Anxiety Score 28   CIWA-Ar   Pulse 65     Patient  report increase anxiety. Atarax  100 mg  given   
   10/27/24 1221   Gastrointestinal   Gastrointestinal (WDL) X   Abdomen Inspection Obese;Soft   Bowel Sounds (All Quadrants) Normoactive   Tenderness Nontender   Last BM Date   (unable  to  recall.If  had been 5 day or  1 wek)     Bleps assessment completed. PRN  Miralax  administered . Result pending.    
   11/01/24 0958   Brothers Anxiety Scale   Anxious Mood 4   Tension 4   Fears 4   Insomnia 0   Intellectual 4   Depressed Mood 4   Somatic Complaints: Muscular 0   Somatic Complaints: Sensory 0   Cardiovascular Symptoms 0   Respiratory Symptoms 0   Gastrointestinal Symptoms 0   Genitourinary Symptoms 0   Autonomic Symptoms 4   Behavior at Interview 4   Brothers Anxiety Score 28     Patient  report increase anxiety related  to over  stimulated  unit.  Atarax  100 mg    
Normal rate, regular rhythm, normal S1, S2 heart sounds heard.

## 2024-11-01 NOTE — PLAN OF CARE
Problem: Alteration in Thoughts and Perception  Goal: Attend and participate in unit activities, including therapeutic, recreational, and educational groups  Description: Interventions:  -Encourage Visitation and family involvement in care  Outcome: Progressing     Problem: Ineffective Coping  Goal: Participates in unit activities  Description: Interventions:  - Provide therapeutic environment   - Provide required programming   - Redirect inappropriate behaviors   Outcome: Progressing     Problem: Depression  Goal: Attend and participate in unit activities, including therapeutic, recreational, and educational groups  Description: Interventions:  - Provide therapeutic and educational activities daily, encourage attendance and participation, and document same in the medical record   Outcome: Progressing   Patient is active in group therapy

## 2024-11-01 NOTE — NURSING NOTE
Patient is visible during snacks and needs, endorsing anxiety today. She reports being in her room coloring is a good coping mechanism for her. She is pleasant and brightens during interaction. Patient denies SI/HI and hallucinations. She is compliant with scheduled medications and able to make her needs known. Continuous q15 minute checks ongoing.

## 2024-11-01 NOTE — PLAN OF CARE
Problem: Risk for Self Injury/Neglect  Goal: Refrain from harming self  Description: Interventions:  - Monitor patient closely, per order  - Develop a trusting relationship  - Supervise medication ingestion, monitor effects and side effects   Outcome: Progressing     Problem: Depression  Goal: Treatment Goal: Demonstrate behavioral control of depressive symptoms, verbalize feelings of improved mood/affect, and adopt new coping skills prior to discharge  Outcome: Progressing

## 2024-11-01 NOTE — PROGRESS NOTES
Progress Note - Behavioral Health   Name: Gita Rendon 39 y.o. female I MRN: [id number]  Unit/Bed#: -02 I Date of Admission: 10/24/2024   Date of Service: 11/1/2024 I Hospital Day: 8    Assessment & Plan  Severe episode of recurrent major depressive disorder, without psychotic features (HCC)  Patient presenting with increased depression, thoughts of self-harm due to stressors of losses, loneliness and physical issues including headaches from ketamine therapy.    Plan:  Continue:  Cymbalta 120 mg PO QD  Vraylar 6 mg PO QD  Discontinue Luvox 25 mg after tonight's dose  Topamax 150mg PO BID  EILEEN (generalized anxiety disorder)  Continue Gabapentin 300mg PO QD and 900mg PO QHS    Progress Toward Goals: Improving.  Maintaining safety and mood control.  Ruminations less intense.  Forward thinking.  Luvox to be discontinued after at bedtime dose this evening.  Can consider further titration of gabapentin should anxiety persists, however no medication changes indicated for today due to improvement.  Patient appears to be approaching her psychiatric baseline.  Suspect polypharmacy could contribute to increase intensity/frequency of headaches.  Met with medical PA-C to discuss polypharmacy and plan to reduce medications.  Potential discharge home by early to mid next week should patient continue to maintain safety and improvement.    Recommended Treatment: Continue with group therapy, milieu therapy and occupational therapy.      Risks, benefits and possible side effects of Medications:   Risks, benefits, and possible side effects of medications explained to patient and patient verbalizes understanding.      History of Present Illness   Behavior over the last 24 hours:  improved  Sleep: normal  Appetite: normal  Medication side effects: No  ROS: no complaints and all other systems are negative    Subjective: Elizabeth has been visible and engaged in milieu activities.  Reports improving depression and maintaining safety  "with no return of SI.  Anxiety persists but rates as 5/10 and improving.  Ruminations persists however decreasing in intensity.  Forward thinking with plan to \"stay out of the house\" upon discharge.  Refusing PHP at this time stating \"I did not really like it and it is too far for me to drive.\"  Also declined virtual option stating \"it will keep me in the house.\"  Receptive to medication education and agreeable to work with medical team to reduce polypharmacy and duplicate indication for medications.    Objective   Mental Status Evaluation:  Appearance:  age appropriate, casually dressed, and overweight   Behavior:  Cooperative   Speech:  normal pitch and normal volume   Mood:  \"Less depressed\"   Affect:  constricted and mood-congruent   Thought Process:  Forward thinking   Associations: circumstantial associations   Thought Content:  Less ruminating   Perceptual Disturbances: Denies AVH, did not appear internally preoccupied   Risk Potential: Suicidal Ideations none  Homicidal Ideations none  Potential for Aggression No   Sensorium:  person, place, time/date, and situation   Memory:  recent and remote memory grossly intact   Consciousness:  alert and awake    Attention: attention span and concentration were age appropriate   Insight:  limited   Judgment: limited   Gait/Station: normal gait/station and normal balance   Motor Activity: no abnormal movements     Medications: all current active meds have been reviewed, continue current psychiatric medications, and planned medication changes: Discontinue Luvox 25 mg PO QHS after tonight's dose .      Lab Results: I have reviewed the following results:  CMP:   Lab Results   Component Value Date    SODIUM 140 10/23/2024    K 3.7 10/23/2024     (H) 10/23/2024    CO2 21 10/23/2024    AGAP 7 10/23/2024    BUN 28 (H) 10/23/2024    CREATININE 0.75 10/23/2024    GLUC 90 10/23/2024    GLUF 96 10/15/2024    CALCIUM 9.0 10/23/2024    AST 24 10/09/2024    ALT 26 10/09/2024    " ALKPHOS 86 10/09/2024    TP 6.8 10/09/2024    ALB 4.1 10/09/2024    TBILI 0.32 10/09/2024    EGFR 100 10/23/2024

## 2024-11-01 NOTE — PROGRESS NOTES
11/01/24    Team Meeting   Meeting Type Daily Rounds   Team Members Present   Team Members Present Physician;Nurse;;Occupational Therapist   Physician Team Member David COOL, Adria COOL, Aisha COOL, Zina AHUJA   Nursing Team Member Yaw CHRISTIAN   Social Work Team Member Javier MOSS   OT Team Member Pope PHYLLIS   Patient/Family Present   Patient Present No   Patient's Family Present No     201, visible, ruminates on past appears baseline, pleasant, med monitoring, maintaining safety, dc Tuesday or Wednesday home with Ethos and CM

## 2024-11-02 PROCEDURE — 99232 SBSQ HOSP IP/OBS MODERATE 35: CPT | Performed by: NURSE PRACTITIONER

## 2024-11-02 RX ADMIN — TOPIRAMATE 150 MG: 25 TABLET, FILM COATED ORAL at 09:16

## 2024-11-02 RX ADMIN — GABAPENTIN 900 MG: 600 TABLET, FILM COATED ORAL at 21:27

## 2024-11-02 RX ADMIN — CARIPRAZINE 6 MG: 3 CAPSULE, GELATIN COATED ORAL at 09:15

## 2024-11-02 RX ADMIN — POLYETHYLENE GLYCOL 3350 17 G: 17 POWDER, FOR SOLUTION ORAL at 09:15

## 2024-11-02 RX ADMIN — PANTOPRAZOLE SODIUM 40 MG: 40 TABLET, DELAYED RELEASE ORAL at 09:15

## 2024-11-02 RX ADMIN — CELECOXIB 200 MG: 100 CAPSULE ORAL at 17:45

## 2024-11-02 RX ADMIN — CYCLOSPORINE 1 DROP: 0 SOLUTION/ DROPS OPHTHALMIC; TOPICAL at 17:49

## 2024-11-02 RX ADMIN — METFORMIN HYDROCHLORIDE 500 MG: 500 TABLET ORAL at 09:16

## 2024-11-02 RX ADMIN — SALINE NASAL SPRAY 1 SPRAY: 1.5 SOLUTION NASAL at 09:17

## 2024-11-02 RX ADMIN — CELECOXIB 200 MG: 100 CAPSULE ORAL at 09:15

## 2024-11-02 RX ADMIN — LEVOTHYROXINE SODIUM 112 MCG: 112 TABLET ORAL at 05:43

## 2024-11-02 RX ADMIN — DOCUSATE SODIUM 200 MG: 100 CAPSULE, LIQUID FILLED ORAL at 17:45

## 2024-11-02 RX ADMIN — DULOXETINE HYDROCHLORIDE 120 MG: 60 CAPSULE, DELAYED RELEASE ORAL at 09:15

## 2024-11-02 RX ADMIN — CYCLOSPORINE 1 DROP: 0 SOLUTION/ DROPS OPHTHALMIC; TOPICAL at 09:17

## 2024-11-02 RX ADMIN — LORATADINE 10 MG: 10 TABLET ORAL at 09:16

## 2024-11-02 RX ADMIN — TOPIRAMATE 150 MG: 25 TABLET, FILM COATED ORAL at 17:45

## 2024-11-02 RX ADMIN — Medication 1 APPLICATION: at 09:18

## 2024-11-02 RX ADMIN — GABAPENTIN 300 MG: 300 CAPSULE ORAL at 09:15

## 2024-11-02 RX ADMIN — RIMEGEPANT SULFATE 75 MG: 75 TABLET, ORALLY DISINTEGRATING ORAL at 09:17

## 2024-11-02 RX ADMIN — ACETAZOLAMIDE 500 MG: 250 TABLET ORAL at 09:15

## 2024-11-02 RX ADMIN — DOCUSATE SODIUM 100 MG: 100 CAPSULE, LIQUID FILLED ORAL at 09:16

## 2024-11-02 RX ADMIN — PANTOPRAZOLE SODIUM 40 MG: 40 TABLET, DELAYED RELEASE ORAL at 17:45

## 2024-11-02 RX ADMIN — ACETAZOLAMIDE 500 MG: 250 TABLET ORAL at 17:45

## 2024-11-02 RX ADMIN — OXYBUTYNIN CHLORIDE 10 MG: 5 TABLET, EXTENDED RELEASE ORAL at 09:16

## 2024-11-02 RX ADMIN — SALINE NASAL SPRAY 1 SPRAY: 1.5 SOLUTION NASAL at 21:27

## 2024-11-02 RX ADMIN — B-COMPLEX W/ C & FOLIC ACID TAB 1 TABLET: TAB at 09:16

## 2024-11-02 RX ADMIN — LINACLOTIDE 1 CAPSULE: 290 CAPSULE, GELATIN COATED ORAL at 09:17

## 2024-11-02 NOTE — ASSESSMENT & PLAN NOTE
Patient presenting with increased depression, thoughts of self-harm due to stressors of losses, loneliness and physical issues including headaches from ketamine therapy.    Plan:  Continue:  Cymbalta 120 mg PO QD  Vraylar 6 mg PO QD  Topamax 150mg PO BID

## 2024-11-02 NOTE — PROGRESS NOTES
"Progress Note - Gita Rendon 39 y.o. female MRN: [id number]    Unit/Bed#: [facility] 258-02 Encounter: [id number]        Subjective:   Patient seen and examined at bedside after reviewing the chart and discussing the case with the caring staff.      Patient examined at bedside.  Patient denies any acute symptoms except her migraine headaches.     Physical Exam   Vitals: Blood pressure 101/52, pulse 61, temperature 98.7 °F (37.1 °C), temperature source Temporal, resp. rate 16, height 5' 8\" (1.727 m), weight (!) 146 kg (321 lb 12.8 oz), last menstrual period 10/15/2024, SpO2 97%.,Body mass index is 48.93 kg/m².  Constitutional: Patient in no acute distress.  HEENT: PERR, EOMI, MMM.  Cardiovascular: Normal rate and regular rhythm.    Pulmonary/Chest: Effort normal and breath sounds normal.   Abdomen: Soft, + BS, NT.    Assessment/Plan:  Gita Rendon is a(n) 39 y.o. female with MDD.     Chronic migraines.  Patient on topamax 150 mg twice daily, Nurtec 75 mg every other day, and Ajovy 225 MG/1.5ML auto-injector monthly (last does 1 week ago).  Intracranial HTN.  Continue Diamox 250 mg daily. Patient follows with [place] [facility].   Allergic rhinitis.  Patient on Zyrtec 10 mg daily.   Dry eyes.  Patient on Cequa 0.09 % 1 drop twice daily (brought from home)  GERD.  Patient on Prilosec 20 mg twice daily.   Hypothyroidism.  Continue levothyroxine 112 mcg daily.   Hyperaldosteronism.  Continue spironolactone 25 mg daily.   Morbid obesity/prediabetes.  Hgb A1c 5.4% on 10/25/24.  Continue metformin 500 mg daily and Wegovy 1.7mg/0.75 mL weekly (brought from home).   Constipation.  Continue Linzess 290 mcg daily (brought from home), colace 100 mg AM and 200 mg PM.  Add Miralax daily 10/30.  Patient told by GI to avoid any Senna.  Dulcolax suppository as needed.   Chronic back pain.  Patient is on gabapentin 300 mg AM and 900 mg HS, Celebrex 200 mg twice daily.    Vitamin deficiency.  Continue daily supplements.  Vitamin B12 " deficiency.  Start patient on vitamin B12 supplements per Dr Liao 10/29.  Dry nose.  Saline nasal spray and Ayr gel scheduled twice daily.

## 2024-11-02 NOTE — PROGRESS NOTES
"Progress Note - Behavioral Health   Name: Giat Rendon 39 y.o. female I MRN: [id number]  Unit/Bed#: -02 I Date of Admission: 10/24/2024   Date of Service: 11/2/2024 I Hospital Day: 9    Assessment & Plan  Severe episode of recurrent major depressive disorder, without psychotic features (HCC)  Patient presenting with increased depression, thoughts of self-harm due to stressors of losses, loneliness and physical issues including headaches from ketamine therapy.    Plan:  Continue:  Cymbalta 120 mg PO QD  Vraylar 6 mg PO QD  Topamax 150mg PO BID  EILEEN (generalized anxiety disorder)  Continue Gabapentin 300mg PO QD and 900mg PO QHS    Progress Toward Goals: Maintaining safety and mood control.  Depression and anxiety improving.  Some negative thinking and ruminations persists regarding keeping busy upon discharge.  Declining PHP.  Luvox discontinued last evening.  Will continue with current psychotropic regimen; patient tolerating well.  Should patient continue to maintain safety and mood control consider discharge early next week.    Recommended Treatment: Continue with group therapy, milieu therapy and occupational therapy.      Risks, benefits and possible side effects of Medications:   Risks, benefits, and possible side effects of medications explained to patient and patient verbalizes understanding.      History of Present Illness   Behavior over the last 24 hours:  improved  Sleep: normal  Appetite: normal  Medication side effects: No  ROS: headache and all other systems are negative    Subjective: Elizabeth has been intermittently visible in the milieu.  Reports having a headache today accompanied by negative thinking.  \"When I get my headaches, I just want to sit my room and not do anything.\"  Required significant prompting to shower and, out of room.  Some ruminations present.  Thoughts are circumstantial with no delusional content.  Identifies improving anxiety and depression.  Maintaining safety with no " "return of SI.  Encouraged to write a schedule to stay busy and keep motivated upon discharge.    Objective   Mental Status Evaluation:  Appearance:  casually dressed and overweight   Behavior:  Cooperative, calm, intermittent eye contact   Speech:  normal pitch and normal volume   Mood:  \"Getting better,\" less depressed, less anxious   Affect:  constricted   Thought Process:  circumstantial   Associations: circumstantial associations   Thought Content:  Some negative thinking, less ruminating   Perceptual Disturbances: Denied AVH, did not appear internally preoccupied   Risk Potential: Suicidal Ideations none at present  Homicidal Ideations none  Potential for Aggression No   Sensorium:  person, place, time/date, and situation   Memory:  recent and remote memory grossly intact   Consciousness:  alert and awake    Attention: attention span and concentration were age appropriate   Insight:  limited   Judgment: limited   Gait/Station: normal gait/station and normal balance   Motor Activity: no abnormal movements     Medications: all current active meds have been reviewed and continue current psychiatric medications.      Lab Results: I have reviewed the following results:  CMP:   Lab Results   Component Value Date    SODIUM 140 10/23/2024    K 3.7 10/23/2024     (H) 10/23/2024    CO2 21 10/23/2024    AGAP 7 10/23/2024    BUN 28 (H) 10/23/2024    CREATININE 0.75 10/23/2024    GLUC 90 10/23/2024    GLUF 96 10/15/2024    CALCIUM 9.0 10/23/2024    AST 24 10/09/2024    ALT 26 10/09/2024    ALKPHOS 86 10/09/2024    TP 6.8 10/09/2024    ALB 4.1 10/09/2024    TBILI 0.32 10/09/2024    EGFR 100 10/23/2024     "

## 2024-11-02 NOTE — NURSING NOTE
Patient visible on unit .Pleasant and cooperative.Schedule PO medication administered as ordered.Denies hallucination, HI, SI. Appetite good. Attend group.Continue on safety check

## 2024-11-03 PROCEDURE — 99232 SBSQ HOSP IP/OBS MODERATE 35: CPT | Performed by: NURSE PRACTITIONER

## 2024-11-03 RX ADMIN — CELECOXIB 200 MG: 100 CAPSULE ORAL at 08:58

## 2024-11-03 RX ADMIN — SALINE NASAL SPRAY 1 SPRAY: 1.5 SOLUTION NASAL at 09:04

## 2024-11-03 RX ADMIN — DOCUSATE SODIUM 100 MG: 100 CAPSULE, LIQUID FILLED ORAL at 08:58

## 2024-11-03 RX ADMIN — DULOXETINE HYDROCHLORIDE 120 MG: 60 CAPSULE, DELAYED RELEASE ORAL at 09:02

## 2024-11-03 RX ADMIN — CYCLOSPORINE 1 DROP: 0 SOLUTION/ DROPS OPHTHALMIC; TOPICAL at 17:29

## 2024-11-03 RX ADMIN — CELECOXIB 200 MG: 100 CAPSULE ORAL at 17:27

## 2024-11-03 RX ADMIN — ACETAZOLAMIDE 500 MG: 250 TABLET ORAL at 08:58

## 2024-11-03 RX ADMIN — ACETAZOLAMIDE 500 MG: 250 TABLET ORAL at 17:27

## 2024-11-03 RX ADMIN — OXYBUTYNIN CHLORIDE 10 MG: 5 TABLET, EXTENDED RELEASE ORAL at 08:58

## 2024-11-03 RX ADMIN — B-COMPLEX W/ C & FOLIC ACID TAB 1 TABLET: TAB at 08:58

## 2024-11-03 RX ADMIN — GABAPENTIN 300 MG: 300 CAPSULE ORAL at 08:58

## 2024-11-03 RX ADMIN — Medication 1 APPLICATION: at 09:04

## 2024-11-03 RX ADMIN — LEVOTHYROXINE SODIUM 112 MCG: 112 TABLET ORAL at 06:18

## 2024-11-03 RX ADMIN — TOPIRAMATE 150 MG: 25 TABLET, FILM COATED ORAL at 17:27

## 2024-11-03 RX ADMIN — POLYETHYLENE GLYCOL 3350 17 G: 17 POWDER, FOR SOLUTION ORAL at 08:58

## 2024-11-03 RX ADMIN — DOCUSATE SODIUM 200 MG: 100 CAPSULE, LIQUID FILLED ORAL at 17:27

## 2024-11-03 RX ADMIN — TOPIRAMATE 150 MG: 25 TABLET, FILM COATED ORAL at 08:58

## 2024-11-03 RX ADMIN — PANTOPRAZOLE SODIUM 40 MG: 40 TABLET, DELAYED RELEASE ORAL at 09:02

## 2024-11-03 RX ADMIN — Medication 1 APPLICATION: at 21:25

## 2024-11-03 RX ADMIN — GABAPENTIN 900 MG: 600 TABLET, FILM COATED ORAL at 21:21

## 2024-11-03 RX ADMIN — SALINE NASAL SPRAY 1 SPRAY: 1.5 SOLUTION NASAL at 21:25

## 2024-11-03 RX ADMIN — METFORMIN HYDROCHLORIDE 500 MG: 500 TABLET ORAL at 09:02

## 2024-11-03 RX ADMIN — PANTOPRAZOLE SODIUM 40 MG: 40 TABLET, DELAYED RELEASE ORAL at 17:27

## 2024-11-03 RX ADMIN — CARIPRAZINE 6 MG: 3 CAPSULE, GELATIN COATED ORAL at 08:58

## 2024-11-03 RX ADMIN — LINACLOTIDE 1 CAPSULE: 290 CAPSULE, GELATIN COATED ORAL at 09:03

## 2024-11-03 RX ADMIN — CYCLOSPORINE 1 DROP: 0 SOLUTION/ DROPS OPHTHALMIC; TOPICAL at 09:04

## 2024-11-03 RX ADMIN — LORATADINE 10 MG: 10 TABLET ORAL at 08:58

## 2024-11-03 NOTE — NURSING NOTE
Pt was visible on the unit, withdrawn to self. Compliant with medication. Cooperative with staff during care. Endorsed mild to moderate depression. Denied SI, HI, AVH, or anxiety. Speech pattern was normal and relaxed. Appearance and hygiene was unremarkable. Made no c/o pain or discomfort. Safety checks continued.

## 2024-11-03 NOTE — PROGRESS NOTES
Progress Note - Behavioral Health   Name: Gita Rendon 39 y.o. female I MRN: [id number]  Unit/Bed#: -02 I Date of Admission: 10/24/2024   Date of Service: 11/3/2024 I Hospital Day: 10    Assessment & Plan  Severe episode of recurrent major depressive disorder, without psychotic features (HCC)  Patient presenting with increased depression, thoughts of self-harm due to stressors of losses, loneliness and physical issues including headaches from ketamine therapy.    Plan:  Continue:  Cymbalta 120 mg PO QD  Vraylar 6 mg PO QD  Topamax 150mg PO BID  EILEEN (generalized anxiety disorder)  Continue Gabapentin 300mg PO QD and 900mg PO QHS    Progress Toward Goals: Improving.  Maintaining safety with no return of SI.  Reports improving depression and anxiety and presents with congruent affect.  Will continue with current psychotropic regimen; patient tolerating well.  Anticipated discharge within the next 48 to 72 hours.    Recommended Treatment: Continue with group therapy, milieu therapy and occupational therapy.      Risks, benefits and possible side effects of Medications:   Risks, benefits, and possible side effects of medications explained to patient and patient verbalizes understanding.      History of Present Illness   Behavior over the last 24 hours:  improved  Sleep: normal  Appetite: normal  Medication side effects: No  ROS: no complaints and all other systems are negative    Subjective: Elizabeth is more visible in the milieu and attended group this morning.  Thoughts less ruminating.  Endorses improving anxiety and depression and presents with congruent affect.  Forward thinking and making a list of things she can do to stay busy upon discharge.  Maintaining safety with no return of SI.  Thoughts goal directed with circumstantial associations.  No delusional content verbalized.  Maintaining ADLs and adequate sleep.    Objective   Mental Status Evaluation:  Appearance:  casually dressed and overweight  "  Behavior:  Cooperative, less withdrawn   Speech:  normal pitch and normal volume   Mood:  \"Getting better\" , less depressed, less anxious   Affect:  mood-congruent   Thought Process:  goal directed   Associations: circumstantial associations   Thought Content:  Less ruminating , no delusional content verbalized   Perceptual Disturbances: Denied AVH, did not appear internally preoccupied   Risk Potential: Suicidal Ideations none  Homicidal Ideations none  Potential for Aggression No   Sensorium:  person, place, time/date, and situation   Memory:  recent and remote memory grossly intact   Consciousness:  alert and awake    Attention: attention span and concentration were age appropriate   Insight:  limited   Judgment: limited   Gait/Station: normal gait/station and normal balance   Motor Activity: no abnormal movements     Medications: all current active meds have been reviewed and continue current psychiatric medications.      Lab Results: I have reviewed the following results:  CMP:   Lab Results   Component Value Date    SODIUM 140 10/23/2024    K 3.7 10/23/2024     (H) 10/23/2024    CO2 21 10/23/2024    AGAP 7 10/23/2024    BUN 28 (H) 10/23/2024    CREATININE 0.75 10/23/2024    GLUC 90 10/23/2024    GLUF 96 10/15/2024    CALCIUM 9.0 10/23/2024    AST 24 10/09/2024    ALT 26 10/09/2024    ALKPHOS 86 10/09/2024    TP 6.8 10/09/2024    ALB 4.1 10/09/2024    TBILI 0.32 10/09/2024    EGFR 100 10/23/2024       "

## 2024-11-03 NOTE — PROGRESS NOTES
"Progress Note - Gita Rendon 39 y.o. female MRN: [id number]    Unit/Bed#: [facility] 258-02 Encounter: [id number]        Subjective:   Patient seen and examined at bedside after reviewing the chart and discussing the case with the caring staff.      Patient examined at bedside.  Patient denies any acute symptom.    Patient is a possible discharge on Tuesday.    Physical Exam   Vitals: Blood pressure 130/76, pulse 66, temperature 98.6 °F (37 °C), temperature source Temporal, resp. rate 17, height 5' 8\" (1.727 m), weight (!) 150 kg (329 lb 9.6 oz), last menstrual period 10/15/2024, SpO2 97%.,Body mass index is 50.12 kg/m².  Constitutional: Patient in no acute distress.  HEENT: PERR, EOMI, MMM.  Cardiovascular: Normal rate and regular rhythm.    Pulmonary/Chest: Effort normal and breath sounds normal.   Abdomen: Soft, + BS, NT.    Assessment/Plan:  Gita Rendon is a(n) 39 y.o. female with MDD.     Chronic migraines.  Patient on topamax 150 mg twice daily, Nurtec 75 mg every other day, and Ajovy 225 MG/1.5ML auto-injector monthly (last does 1 week ago).  Intracranial HTN.  Continue Diamox 250 mg daily. Patient follows with [place] [facility].   Allergic rhinitis.  Patient on Zyrtec 10 mg daily.   Dry eyes.  Patient on Cequa 0.09 % 1 drop twice daily (brought from home)  GERD.  Patient on Prilosec 20 mg twice daily.   Hypothyroidism.  Continue levothyroxine 112 mcg daily.   Hyperaldosteronism.  Continue spironolactone 25 mg daily.   Morbid obesity/prediabetes.  Hgb A1c 5.4% on 10/25/24.  Continue metformin 500 mg daily and Wegovy 1.7mg/0.75 mL weekly (brought from home).   Constipation.  Continue Linzess 290 mcg daily (brought from home), colace 100 mg AM and 200 mg PM.  Add Miralax daily 10/30.  Patient told by GI to avoid any Senna.  Dulcolax suppository as needed.   Chronic back pain.  Patient is on gabapentin 300 mg AM and 900 mg HS, Celebrex 200 mg twice daily.    Vitamin deficiency.  Continue daily " supplements.  Vitamin B12 deficiency.  Start patient on vitamin B12 supplements per Dr Liao 10/29.  Dry nose.  Saline nasal spray and Ayr gel scheduled twice daily.

## 2024-11-03 NOTE — NURSING NOTE
Patient  visible on milieu.Pleasant and cooperative.Schedule PO medication administered as ordered.Flat affect Denies hallucination, HI, SI.  Appetite good. Attend group.Continue on safety check

## 2024-11-04 ENCOUNTER — TRANSITIONAL CARE MANAGEMENT (OUTPATIENT)
Dept: FAMILY MEDICINE CLINIC | Facility: CLINIC | Age: 39
End: 2024-11-04

## 2024-11-04 PROCEDURE — 99232 SBSQ HOSP IP/OBS MODERATE 35: CPT | Performed by: NURSE PRACTITIONER

## 2024-11-04 RX ORDER — ACETAZOLAMIDE 250 MG/1
250 TABLET ORAL 2 TIMES DAILY
Start: 2024-11-04

## 2024-11-04 RX ORDER — ACETAZOLAMIDE 250 MG/1
500 TABLET ORAL 2 TIMES DAILY
Start: 2024-11-04

## 2024-11-04 RX ORDER — DULOXETIN HYDROCHLORIDE 60 MG/1
120 CAPSULE, DELAYED RELEASE ORAL DAILY
Qty: 60 CAPSULE | Refills: 0 | Status: SHIPPED | OUTPATIENT
Start: 2024-11-05 | End: 2024-12-05

## 2024-11-04 RX ADMIN — POLYETHYLENE GLYCOL 3350 17 G: 17 POWDER, FOR SOLUTION ORAL at 08:44

## 2024-11-04 RX ADMIN — GABAPENTIN 900 MG: 600 TABLET, FILM COATED ORAL at 20:51

## 2024-11-04 RX ADMIN — TOPIRAMATE 150 MG: 25 TABLET, FILM COATED ORAL at 16:49

## 2024-11-04 RX ADMIN — CYCLOSPORINE 1 DROP: 0 SOLUTION/ DROPS OPHTHALMIC; TOPICAL at 16:52

## 2024-11-04 RX ADMIN — TOPIRAMATE 150 MG: 25 TABLET, FILM COATED ORAL at 08:44

## 2024-11-04 RX ADMIN — LINACLOTIDE 1 CAPSULE: 290 CAPSULE, GELATIN COATED ORAL at 08:44

## 2024-11-04 RX ADMIN — ACETAZOLAMIDE 500 MG: 250 TABLET ORAL at 08:45

## 2024-11-04 RX ADMIN — DOCUSATE SODIUM 100 MG: 100 CAPSULE, LIQUID FILLED ORAL at 08:45

## 2024-11-04 RX ADMIN — PANTOPRAZOLE SODIUM 40 MG: 40 TABLET, DELAYED RELEASE ORAL at 08:51

## 2024-11-04 RX ADMIN — CARIPRAZINE 6 MG: 3 CAPSULE, GELATIN COATED ORAL at 08:44

## 2024-11-04 RX ADMIN — PANTOPRAZOLE SODIUM 40 MG: 40 TABLET, DELAYED RELEASE ORAL at 16:48

## 2024-11-04 RX ADMIN — RIMEGEPANT SULFATE 75 MG: 75 TABLET, ORALLY DISINTEGRATING ORAL at 08:44

## 2024-11-04 RX ADMIN — METFORMIN HYDROCHLORIDE 500 MG: 500 TABLET ORAL at 08:44

## 2024-11-04 RX ADMIN — CELECOXIB 200 MG: 100 CAPSULE ORAL at 08:44

## 2024-11-04 RX ADMIN — DOCUSATE SODIUM 200 MG: 100 CAPSULE, LIQUID FILLED ORAL at 16:49

## 2024-11-04 RX ADMIN — CYCLOSPORINE 1 DROP: 0 SOLUTION/ DROPS OPHTHALMIC; TOPICAL at 08:44

## 2024-11-04 RX ADMIN — Medication 1 APPLICATION: at 20:53

## 2024-11-04 RX ADMIN — OXYBUTYNIN CHLORIDE 10 MG: 5 TABLET, EXTENDED RELEASE ORAL at 08:45

## 2024-11-04 RX ADMIN — CELECOXIB 200 MG: 100 CAPSULE ORAL at 16:49

## 2024-11-04 RX ADMIN — DULOXETINE HYDROCHLORIDE 120 MG: 60 CAPSULE, DELAYED RELEASE ORAL at 08:45

## 2024-11-04 RX ADMIN — B-COMPLEX W/ C & FOLIC ACID TAB 1 TABLET: TAB at 08:45

## 2024-11-04 RX ADMIN — SALINE NASAL SPRAY 1 SPRAY: 1.5 SOLUTION NASAL at 20:53

## 2024-11-04 RX ADMIN — LORATADINE 10 MG: 10 TABLET ORAL at 08:44

## 2024-11-04 RX ADMIN — ACETAZOLAMIDE 500 MG: 250 TABLET ORAL at 16:50

## 2024-11-04 RX ADMIN — GABAPENTIN 300 MG: 300 CAPSULE ORAL at 08:45

## 2024-11-04 RX ADMIN — LEVOTHYROXINE SODIUM 112 MCG: 112 TABLET ORAL at 05:29

## 2024-11-04 NOTE — PROGRESS NOTES
"Progress Note - Gita Rendon 39 y.o. female MRN: [id number]    Unit/Bed#: [facility] 258-02 Encounter: [id number]        Subjective:   Patient seen and examined at bedside after reviewing the chart and discussing the case with the caring staff.      Patient examined at bedside.  Patient denies any acute symptoms.    Patient is being discharged Tuesday, 11/5/24.    Physical Exam   Vitals: Blood pressure 117/74, pulse 102, temperature 97.9 °F (36.6 °C), temperature source Temporal, resp. rate 18, height 5' 8\" (1.727 m), weight (!) 150 kg (329 lb 9.6 oz), last menstrual period 10/15/2024, SpO2 96%.,Body mass index is 50.12 kg/m².  Constitutional: Patient in no acute distress.  HEENT: PERR, EOMI, MMM.  Cardiovascular: Normal rate and regular rhythm.    Pulmonary/Chest: Effort normal and breath sounds normal.   Abdomen: Soft, + BS, NT.    Assessment/Plan:  Gita Rendon is a(n) 39 y.o. female with MDD.     Medical Clearance: Patient is medically cleared for discharge. All prescriptions have been sent to pharmacy.     Chronic migraines.  Patient on topamax 150 mg twice daily, Nurtec 75 mg every other day, and Ajovy 225 MG/1.5ML auto-injector monthly (last does 1 week ago).  Intracranial HTN.  Continue Diamox 250 mg daily. Patient follows with [place] [facility].   Allergic rhinitis.  Patient on Zyrtec 10 mg daily.   Dry eyes.  Patient on Cequa 0.09 % 1 drop twice daily (brought from home)  GERD.  Patient on Prilosec 20 mg twice daily.   Hypothyroidism.  Continue levothyroxine 112 mcg daily.   Hyperaldosteronism.  D/c  spironolactone 25 mg daily due to low Bps, okay per nephrology.   Morbid obesity/prediabetes.  Hgb A1c 5.4% on 10/25/24.  Continue metformin 500 mg daily and Wegovy 1.7mg/0.75 mL weekly (brought from home).   Constipation.  Continue Linzess 290 mcg daily (brought from home), colace 100 mg AM and 200 mg PM.  Add Miralax daily 10/30.  Patient told by GI to avoid any Senna.  Dulcolax suppository as needed. "   Chronic back pain.  Patient is on gabapentin 300 mg AM and 900 mg HS, Celebrex 200 mg twice daily.    Vitamin deficiency.  Continue daily supplements.  Vitamin B12 deficiency.  Start patient on vitamin B12 supplements per Dr Liao 10/29.  Dry nose.  Saline nasal spray and Ayr gel scheduled twice daily.     The patient was discussed with Dr. Liao and he is in agreement with the above note.

## 2024-11-04 NOTE — DISCHARGE INSTR - AVS FIRST PAGE
Medical:  Follow up with Neurology regarding Diamox for intracranial hypertension.   Follow up with Nephrology regarding discontinuing Spironolactone while here inpatient.   Follow up with Primary Care Physician regarding metformin and Wegovy.  May be able to discontinue metformin while on Wegovy.

## 2024-11-04 NOTE — SOCIAL WORK
DUNIA placed call to SABRINA Quiroz at [facility] ([phone]) to notify of discharge tomorrow,  to inform provider. Call ended mutually.    DUNIA placed call to [facility] ([phone]) to notify of DC tomorrow and scheduled medication management and talk therapy. PT scheduled on 11/6/24 for med management at 10:45. PT to schedule TT in office.     DUNIA placed call to PCP ([phone]) to schedule transition of care appointment. Pt scheduled on 11/7/2024 at 1:00pm with the resident doctor.     DUNIA placed call to pt's brother Vladimir ([phone]) to notify of discharge tomorrow.

## 2024-11-04 NOTE — NURSING NOTE
Patient calm and cooperative this evening. She states she feels ready for discharge tomorrow. She initially had planned to take an Uber home and scheduled it. After dinner, patient stated that her friend is going to pick her up tomorrow instead and cancelled her Uber.

## 2024-11-04 NOTE — PROGRESS NOTES
11/04/24    Team Meeting   Meeting Type Daily Rounds   Team Members Present   Team Members Present Physician;Nurse;;Occupational Therapist   Physician Team Member Katty COOL, Adria COOL, Zina AHUJA   Nursing Team Member Cassy CHRISTIAN   Social Work Team Member Javier MOSS   OT Team Member Pope PHYLLIS   Patient/Family Present   Patient Present No   Patient's Family Present No     201, denies all, reports feeling better, dc home tomorrow with OP

## 2024-11-04 NOTE — PLAN OF CARE
Problem: Alteration in Thoughts and Perception  Goal: Attend and participate in unit activities, including therapeutic, recreational, and educational groups  Description: Interventions:  -Encourage Visitation and family involvement in care  Outcome: Not Progressing     Problem: Ineffective Coping  Goal: Participates in unit activities  Description: Interventions:  - Provide therapeutic environment   - Provide required programming   - Redirect inappropriate behaviors   Outcome: Not Progressing     Problem: Depression  Goal: Attend and participate in unit activities, including therapeutic, recreational, and educational groups  Description: Interventions:  - Provide therapeutic and educational activities daily, encourage attendance and participation, and document same in the medical record   Outcome: Not Progressing   Patient attends groups

## 2024-11-04 NOTE — NURSING NOTE
Pt was withdrawn to her room, isolated to self. Cooperative with staff during care. Compliant with staff during care. Denied SI, HI, AVH, depression, or anxiety. Affect was flat. Speech pattern was normal and relaxed. Appearance and hygiene was unremarkable. Made no c/o pain or discomfort. Safety checks continued.

## 2024-11-04 NOTE — PLAN OF CARE
Problem: Risk for Self Injury/Neglect  Goal: Refrain from harming self  Description: Interventions:  - Monitor patient closely, per order  - Develop a trusting relationship  - Supervise medication ingestion, monitor effects and side effects   Outcome: Progressing     Problem: Anxiety  Goal: Anxiety is at manageable level  Description: Interventions:  - Assess and monitor patient's anxiety level.   - Monitor for signs and symptoms (heart palpitations, chest pain, shortness of breath, headaches, nausea, feeling jumpy, restlessness, irritable, apprehensive).   - Collaborate with interdisciplinary team and initiate plan and interventions as ordered.  - [place] patient to unit/surroundings  - Explain treatment plan  - Encourage participation in care  - Encourage verbalization of concerns/fears  - Identify coping mechanisms  - Assist in developing anxiety-reducing skills  - Administer/offer alternative therapies  - Limit or eliminate stimulants  Outcome: Progressing

## 2024-11-04 NOTE — PROGRESS NOTES
"Progress Note - Behavioral Health   Name: Gita Rendon 39 y.o. female I MRN: [id number]  Unit/Bed#: -02 I Date of Admission: 10/24/2024   Date of Service: 11/4/2024 I Hospital Day: 11    Assessment & Plan  Severe episode of recurrent major depressive disorder, without psychotic features (HCC)  Patient presenting with increased depression, thoughts of self-harm due to stressors of losses, loneliness and physical issues including headaches from ketamine therapy.    Plan:  Continue:  Cymbalta 120 mg PO QD  Vraylar 6 mg PO QD  Topamax 150mg PO BID  EILEEN (generalized anxiety disorder)  Continue Gabapentin 300mg PO QD and 900mg PO QHS    Progress Toward Goals: Improving.  Endorsing improving anxiety and depression and presents with congruent affect.  Maintaining safety with no return of SI.  We will continue with current psychotropic regimen; patient tolerating well.  Patient appears to be at her psychiatric baseline.  Anticipated discharge home tomorrow, 11/5/2024, with outpatient psychiatric follow-up through [facility].     Recommended Treatment: Continue with group therapy, milieu therapy and occupational therapy.      Risks, benefits and possible side effects of Medications:   Risks, benefits, and possible side effects of medications explained to patient and patient verbalizes understanding.      History of Present Illness   Behavior over the last 24 hours:  improved  Sleep: normal  Appetite: normal  Medication side effects: No  ROS: no complaints and all other systems are negative    Subjective: Elizabeth has been maintaining safety and mood control with no return of SI.  Thoughts are organized, linear, and forward thinking.  No delusional content verbalized.  Able to identify adequate safety plan and protective factors against suicide.  Maintaining ADLs, appetite, and sleep.  Motivated to \"stay busy\" upon discharge from the hospital and volunteer at local food bank.  Thoughts are less ruminating.  Compliant " "with medications and denies any side effects.    Objective   Mental Status Evaluation:  Appearance:  age appropriate, casually dressed, and overweight   Behavior:  Cooperative, calm, social     Speech:  normal pitch and normal volume   Mood:  \"Good\"   Affect:  mood-congruent and redirectable   Thought Process:  goal directed and linear, forward thinking   Associations: circumstantial associations   Thought Content:  Less ruminating, no overt delusions or paranoia verbalized   Perceptual Disturbances: Denied AVH, did not appear internally preoccupied   Risk Potential: Suicidal Ideations none  Homicidal Ideations none  Potential for Aggression No   Sensorium:  person, place, time/date, and situation   Memory:  recent and remote memory grossly intact   Consciousness:  alert and awake    Attention: attention span and concentration were age appropriate   Insight:  Improving   Judgment: Improving   Gait/Station: normal gait/station and normal balance   Motor Activity: no abnormal movements     Medications: all current active meds have been reviewed and continue current psychiatric medications.      Lab Results: I have reviewed the following results:  CMP:   Lab Results   Component Value Date    SODIUM 140 10/23/2024    K 3.7 10/23/2024     (H) 10/23/2024    CO2 21 10/23/2024    AGAP 7 10/23/2024    BUN 28 (H) 10/23/2024    CREATININE 0.75 10/23/2024    GLUC 90 10/23/2024    GLUF 96 10/15/2024    CALCIUM 9.0 10/23/2024    AST 24 10/09/2024    ALT 26 10/09/2024    ALKPHOS 86 10/09/2024    TP 6.8 10/09/2024    ALB 4.1 10/09/2024    TBILI 0.32 10/09/2024    EGFR 100 10/23/2024     "

## 2024-11-05 VITALS
SYSTOLIC BLOOD PRESSURE: 123 MMHG | HEART RATE: 95 BPM | DIASTOLIC BLOOD PRESSURE: 86 MMHG | BODY MASS INDEX: 44.41 KG/M2 | WEIGHT: 293 LBS | OXYGEN SATURATION: 98 % | RESPIRATION RATE: 18 BRPM | TEMPERATURE: 97.7 F | HEIGHT: 68 IN

## 2024-11-05 PROCEDURE — 99239 HOSP IP/OBS DSCHRG MGMT >30: CPT | Performed by: HOSPITALIST

## 2024-11-05 RX ADMIN — CARIPRAZINE 6 MG: 3 CAPSULE, GELATIN COATED ORAL at 08:58

## 2024-11-05 RX ADMIN — SALINE NASAL SPRAY 1 SPRAY: 1.5 SOLUTION NASAL at 09:28

## 2024-11-05 RX ADMIN — GABAPENTIN 300 MG: 300 CAPSULE ORAL at 08:58

## 2024-11-05 RX ADMIN — DOCUSATE SODIUM 100 MG: 100 CAPSULE, LIQUID FILLED ORAL at 08:59

## 2024-11-05 RX ADMIN — CELECOXIB 200 MG: 100 CAPSULE ORAL at 08:58

## 2024-11-05 RX ADMIN — DULOXETINE HYDROCHLORIDE 120 MG: 60 CAPSULE, DELAYED RELEASE ORAL at 08:58

## 2024-11-05 RX ADMIN — LORATADINE 10 MG: 10 TABLET ORAL at 08:58

## 2024-11-05 RX ADMIN — ACETAZOLAMIDE 500 MG: 250 TABLET ORAL at 08:58

## 2024-11-05 RX ADMIN — Medication 1 APPLICATION: at 09:27

## 2024-11-05 RX ADMIN — CYCLOSPORINE 1 DROP: 0 SOLUTION/ DROPS OPHTHALMIC; TOPICAL at 09:02

## 2024-11-05 RX ADMIN — TOPIRAMATE 150 MG: 25 TABLET, FILM COATED ORAL at 08:59

## 2024-11-05 RX ADMIN — METFORMIN HYDROCHLORIDE 500 MG: 500 TABLET ORAL at 09:03

## 2024-11-05 RX ADMIN — OXYBUTYNIN CHLORIDE 10 MG: 5 TABLET, EXTENDED RELEASE ORAL at 08:58

## 2024-11-05 RX ADMIN — LEVOTHYROXINE SODIUM 112 MCG: 112 TABLET ORAL at 05:46

## 2024-11-05 RX ADMIN — B-COMPLEX W/ C & FOLIC ACID TAB 1 TABLET: TAB at 08:59

## 2024-11-05 RX ADMIN — PANTOPRAZOLE SODIUM 40 MG: 40 TABLET, DELAYED RELEASE ORAL at 08:58

## 2024-11-05 RX ADMIN — LINACLOTIDE 1 CAPSULE: 290 CAPSULE, GELATIN COATED ORAL at 09:02

## 2024-11-05 NOTE — NURSING NOTE
Patient discharged from unit with all belongings including medications brought to unit. All discharge instructions follow up appointments and medications reviewed Patient walked to lobby by staff

## 2024-11-05 NOTE — BH TRANSITION RECORD
Contact Information: If you have any questions, concerns, pended studies, tests and/or procedures, or emergencies regarding your inpatient behavioral health visit. Please contact [facility] # [phone] and ask to speak to a , nurse or physician. A contact is available 24 hours/ 7 days a week at this number.     Summary of Procedures Performed During your Stay:  Below is a list of major procedures performed during your hospital stay and a summary of results:  - No major procedures performed.    Pending Studies (From admission, onward)      None          Please follow up on the above pending studies with your PCP and/or referring provider.

## 2024-11-05 NOTE — PROGRESS NOTES
11/05/24    Team Meeting   Meeting Type Daily Rounds   Team Members Present   Team Members Present Physician;Nurse;;Occupational Therapist   Physician Team Member Katty COOL, Adria COOL, Zina AHUJA   Nursing Team Member Cassy CHRISTIAN   Social Work Team Member Javier MOSS   OT Team Member Pope PHYLLIS   Patient/Family Present   Patient Present No   Patient's Family Present No     201, dc home today with UP support through [facility] and CM through [facility]

## 2024-11-05 NOTE — PLAN OF CARE
Problem: Alteration in Thoughts and Perception  Goal: Treatment Goal: Gain control of psychotic behaviors/thinking, reduce/eliminate presenting symptoms and demonstrate improved reality functioning upon discharge  Outcome: Completed  Goal: Verbalize thoughts and feelings  Description: Interventions:  - Promote a nonjudgmental and trusting relationship with the patient through active listening and therapeutic communication  - Assess patient's level of functioning, behavior and potential for risk  - Engage patient in 1 on 1 interactions  - Encourage patient to express fears, feelings, frustrations, and discuss symptoms    - [place] patient to reality, help patient recognize reality-based thinking   - Administer medications as ordered and assess for potential side effects  - Provide the patient education related to the signs and symptoms of the illness and desired effects of prescribed medications  Outcome: Completed  Goal: Refrain from acting on delusional thinking/internal stimuli  Description: Interventions:  - Monitor patient closely, per order   - Utilize least restrictive measures   - Set reasonable limits, give positive feedback for acceptable   - Administer medications as ordered and monitor of potential side effects  Outcome: Completed  Goal: Agree to be compliant with medication regime, as prescribed and report medication side effects  Description: Interventions:  - Offer appropriate PRN medication and supervise ingestion; conduct AIMS, as needed   Outcome: Completed  Goal: Attend and participate in unit activities, including therapeutic, recreational, and educational groups  Description: Interventions:  -Encourage Visitation and family involvement in care  Outcome: Completed  Goal: Recognize dysfunctional thoughts, communicate reality-based thoughts at the time of discharge  Description: Interventions:  - Provide medication and psycho-education to assist patient in compliance and developing insight into  his/her illness   Outcome: Completed  Goal: Complete daily ADLs, including personal hygiene independently, as able  Description: Interventions:  - Observe, teach, and assist patient with ADLS  - Monitor and promote a balance of rest/activity, with adequate nutrition and elimination   Outcome: Completed     Problem: Ineffective Coping  Goal: Cooperates with admission process  Description: Interventions:   - Complete admission process  Outcome: Completed  Goal: Identifies ineffective coping skills  Outcome: Completed  Goal: Identifies healthy coping skills  Outcome: Completed  Goal: Demonstrates healthy coping skills  Outcome: Completed  Goal: Participates in unit activities  Description: Interventions:  - Provide therapeutic environment   - Provide required programming   - Redirect inappropriate behaviors   Outcome: Completed  Goal: Patient/Family participate in treatment and DC plans  Description: Interventions:  - Provide therapeutic environment  Outcome: Completed  Goal: Patient/Family verbalizes awareness of resources  Outcome: Completed  Goal: Understands least restrictive measures  Description: Interventions:  - Utilize least restrictive behavior  Outcome: Completed  Goal: Free from restraint events  Description: - Utilize least restrictive measures   - Provide behavioral interventions   - Redirect inappropriate behaviors   Outcome: Completed     Problem: Depression  Goal: Treatment Goal: Demonstrate behavioral control of depressive symptoms, verbalize feelings of improved mood/affect, and adopt new coping skills prior to discharge  Outcome: Completed  Goal: Refrain from harming self  Description: Interventions:  - Monitor patient closely, per order   - Supervise medication ingestion, monitor effects and side effects   Outcome: Completed  Goal: Refrain from isolation  Description: Interventions:  - Develop a trusting relationship   - Encourage socialization   Outcome: Completed  Goal: Refrain from  self-neglect  Outcome: Completed  Goal: Attend and participate in unit activities, including therapeutic, recreational, and educational groups  Description: Interventions:  - Provide therapeutic and educational activities daily, encourage attendance and participation, and document same in the medical record   Outcome: Completed     Problem: Risk for Self Injury/Neglect  Goal: Treatment Goal: Remain safe during length of stay, learn and adopt new coping skills, and be free of self-injurious ideation, impulses and acts at the time of discharge  Outcome: Completed  Goal: Refrain from harming self  Description: Interventions:  - Monitor patient closely, per order  - Develop a trusting relationship  - Supervise medication ingestion, monitor effects and side effects   Outcome: Completed  Goal: Recognize maladaptive responses and adopt new coping mechanisms  Outcome: Completed

## 2024-11-05 NOTE — NURSING NOTE
Patient withdrawn to room. Calm and cooperative. Denies SI,HI,AVH. Medication compliant. Safety checks continue Q 15 minutes.

## 2024-11-05 NOTE — PROGRESS NOTES
"Progress Note - Gita Rendon 39 y.o. female MRN: [id number]    Unit/Bed#: [facility] 258-02 Encounter: [id number]        Subjective:   Patient seen and examined at bedside after reviewing the chart and discussing the case with the caring staff.      Patient examined at bedside.  Patient denies any acute symptoms.    Patient is being discharged today.  Patient is requesting prescriptions.  I reviewed and reconciled patient's problem list and medications.    Physical Exam   Vitals: Blood pressure 123/86, pulse 95, temperature 97.7 °F (36.5 °C), temperature source Temporal, resp. rate 18, height 5' 8\" (1.727 m), weight (!) 150 kg (329 lb 9.6 oz), last menstrual period 10/15/2024, SpO2 98%.,Body mass index is 50.12 kg/m².  Constitutional: Patient in no acute distress.  HEENT: PERR, EOMI, MMM.  Cardiovascular: Normal rate and regular rhythm.    Pulmonary/Chest: Effort normal and breath sounds normal.   Abdomen: Soft, + BS, NT.    Assessment/Plan:  Gita Rendon is a(n) 39 y.o. female with MDD.     Medical Clearance: Patient is medically cleared for discharge. All prescriptions have been sent to pharmacy.     Chronic migraines.  Patient on topamax 150 mg twice daily, Nurtec 75 mg every other day, and Ajovy 225 MG/1.5ML auto-injector monthly (last does 1 week ago).  Intracranial HTN.  Continue Diamox 250 mg daily. Patient follows with [place] [facility].   Allergic rhinitis.  Patient on Zyrtec 10 mg daily.   Dry eyes.  Patient on Cequa 0.09 % 1 drop twice daily (brought from home)  GERD.  Patient on Prilosec 20 mg twice daily.   Hypothyroidism.  Continue levothyroxine 112 mcg daily.   Hyperaldosteronism.  D/c  spironolactone 25 mg daily due to low Bps, okay per nephrology.   Morbid obesity/prediabetes.  Hgb A1c 5.4% on 10/25/24.  Continue metformin 500 mg daily and Wegovy 1.7mg/0.75 mL weekly (brought from home).   Constipation.  Continue Linzess 290 mcg daily (brought from home), colace 100 mg AM and 200 mg PM.  Add " Miralax daily 10/30.  Patient told by GI to avoid any Senna.  Dulcolax suppository as needed.   Chronic back pain.  Patient is on gabapentin 300 mg AM and 900 mg HS, Celebrex 200 mg twice daily.    Vitamin deficiency.  Continue daily supplements.  Vitamin B12 deficiency.  Start patient on vitamin B12 supplements per Dr Liao 10/29.  Dry nose.  Saline nasal spray and Ayr gel scheduled twice daily.

## 2024-11-06 NOTE — TELEPHONE ENCOUNTER
Pt called regarding her wegovy status   Did let patient know on 3/16 we sent over to the pharmacy and if any questions she can give us a call back Name band;

## 2024-11-06 NOTE — DISCHARGE SUMMARY
Discharge Summary - Behavioral Health   Name: Gita Rendon 39 y.o. female I MRN: [id number]  Unit/Bed#: -02 I Date of Admission: 10/24/2024   Date of Service: 11/5/2024 I Hospital Day: 12    Discharge Summary - Behavioral [facility]   Gita Rendon 39 y.o. female MRN: [id number]  Unit/Bed#: -02 Encounter: [id number]         Reason for Admission/HPI: Major depressive disorder, single episode, unspecified [F32.9]  Suicide ideation [R45.851]    Patient is a 39 y.o. female presented with history of major depressive disorder and generalized anxiety disorder was admitted to the inpatient adult psychiatric unit on a voluntary 201 on October 24.    Hospital Course: The patient was admitted to the inpatient psychiatric unit and started on every 7 minutes precautions. During the hospitalization the patient was attending individual therapy, group therapy, milieu therapy and occupational therapy.  Patient was continued on Cymbalta 60 mg twice daily, Vraylar 6 mg daily and Luvox 50 mg at bedtime.  Patient was also on gabapentin 300 mg daily and 900 mg at bedtime.  Through hospitalization patient's Luvox was tapered and discontinued as she did not appear to have benefit from the medication.  Topamax 150 mg twice daily was was also continued.  Patient had chronic negative thoughts and self-doubt.  With medication and supportive therapy as well as milieu treatment patient began to show some improvements.  Patient's symptoms improved gradually over the hospital course. At the end of treatment the patient was doing well. Mood was stable at the time of discharge. The patient denied suicidal ideation, intent or plan at the time of discharge and denied homicidal ideation, intent or plan at the time of discharge. There was no overt psychosis at the time of discharge. Sleep and appetite were improved. The patient was tolerating medications and was not reporting any significant side effects at the time of  discharge.    Since the patient was doing well at the end of the hospitalization, treatment team felt that the patient could be safely discharged to outpatient care.     The outpatient follow up with  outpatient provider at [facility]  was arranged by the unit  upon discharge.    Mental Status at time of Discharge:     Appearance:  casually dressed, adequate grooming, looks stated age, overweight   Behavior:  cooperative   Speech:  normal rate, normal volume   Mood:  slightly anxious   Affect:  normal range and intensity   Language: naming objects   Thought Process:  organized   Associations: intact associations   Thought Content:  no overt delusions   Perceptual Disturbances: no auditory hallucinations, no visual hallucinations   Risk Potential: Suicidal ideation - None  Homicidal ideation - None  Potential for aggression - No   Sensorium:  oriented to person, place, and time/date   Memory:  recent and remote memory grossly intact   Consciousness:  alert and awake   Attention: attention span and concentration are age appropriate   Intellect: within normal limits   Fund of Knowledge: awareness of current events: yes   Insight:  fair   Judgment: fair   Muscle Strength Muscle Tone: normal  normal   Gait/Station: normal gait/station   Motor Activity: no abnormal movements       Admission Diagnosis:Major depressive disorder, single episode, unspecified [F32.9]  Suicide ideation [R45.851]    Discharge Diagnosis:   Principal Problem:    Severe episode of recurrent major depressive disorder, without psychotic features (HCC)  Active Problems:    EILEEN (generalized anxiety disorder)  Resolved Problems:    * No resolved hospital problems. *        Lab results:  Admission on 10/24/2024, Discharged on 11/05/2024   Component Date Value    TSH 3RD GENERATON 10/25/2024 2.338     Vitamin B-12 10/25/2024 394     Vit D, 25-Hydroxy 10/25/2024 57.1     Hemoglobin A1C 10/25/2024 5.4     EAG 10/25/2024 108     Cholesterol  10/25/2024 149     Triglycerides 10/25/2024 78     HDL, Direct 10/25/2024 49 (L)     LDL Calculated 10/25/2024 84     Non-HDL-Chol (CHOL-HDL) 10/25/2024 100     Ventricular Rate 10/26/2024 57     Atrial Rate 10/26/2024 57     MN Interval 10/26/2024 194     QRSD Interval 10/26/2024 100     QT Interval 10/26/2024 406     QTC Interval 10/26/2024 395     P Axis 10/26/2024 35     QRS [place] 10/26/2024 13     T Wave [place] 10/26/2024 21        Discharge Medications:  Discharge Medication List as of 11/5/2024 10:37 AM           Discharge Medication List as of 11/5/2024 10:37 AM        STOP taking these medications       Biotin 5000 MCG CAPS Comments:   Reason for Stopping:         cholecalciferol 1,000 units tablet Comments:   Reason for Stopping:         Collagen-Vitamin C-Biotin (Collagen 1500/C) 500-50-0.8 MG CAPS Comments:   Reason for Stopping:         fluvoxaMINE (LUVOX) 50 mg tablet Comments:   Reason for Stopping:         folic acid (FOLVITE) 1 mg tablet Comments:   Reason for Stopping:         famotidine (PEPCID) 20 mg tablet Comments:   Reason for Stopping:         hydrocortisone (ANUSOL-HC) 2.5 % rectal cream Comments:   Reason for Stopping:         ipratropium (ATROVENT) 0.03 % nasal spray Comments:   Reason for Stopping:         ondansetron (ZOFRAN) 4 mg tablet Comments:   Reason for Stopping:         prochlorperazine (COMPAZINE) 10 mg tablet Comments:   Reason for Stopping:         spironolactone (ALDACTONE) 25 mg tablet Comments:   Reason for Stopping:         sucralfate (CARAFATE) 1 g tablet Comments:   Reason for Stopping:                Discharge Medication List as of 11/5/2024 10:37 AM        CONTINUE these medications which have CHANGED    Details   !! acetaZOLAMIDE (DIAMOX) 250 mg tablet Take 1 tablet (250 mg total) by mouth 2 (two) times a day 2 tablets two times a day, Starting Mon 11/4/2024, No Print      !! acetaZOLAMIDE (DIAMOX) 250 mg tablet Take 2 tablets (500 mg total) by mouth 2 (two) times a day,  Starting Mon 11/4/2024, No Print      cariprazine (Vraylar) 6 MG capsule Take 1 capsule (6 mg total) by mouth daily, Starting Mon 11/4/2024, Until Wed 12/4/2024, Normal      DULoxetine (CYMBALTA) 60 mg delayed release capsule Take 2 capsules (120 mg total) by mouth daily, Starting Tue 11/5/2024, Until Thu 12/5/2024, Normal       !! - Potential duplicate medications found. Please discuss with provider.           Discharge Medication List as of 11/5/2024 10:37 AM        CONTINUE these medications which have NOT CHANGED    Details   Ajovy 225 MG/1.5ML auto-injector Inject 225 mg under the skin every 30 (thirty) days Last  weeks  not  sure  what day, Starting Wed 9/11/2024, Historical Med      celecoxib (CeleBREX) 200 mg capsule Take 1 capsule (200 mg total) by mouth 2 (two) times a day, Starting Tue 4/16/2024, Normal      Cequa 0.09 % SOLN Apply 1 drop to eye 2 (two) times a day, Starting Sat 8/5/2023, Normal      cetirizine (ZyrTEC) 10 MG chewable tablet Chew 10 mg daily, Historical Med      docusate sodium (COLACE) 100 mg capsule Take 100 mg by mouth 2 (two) times a day, Historical Med      gabapentin (NEURONTIN) 300 mg capsule Take 1 capsule (300 mg total) by mouth daily after breakfast, Starting Fri 7/12/2024, No Print      gabapentin (NEURONTIN) 600 MG tablet Take 1.5 tablets (900 mg total) by mouth daily at bedtime, Starting Tue 4/16/2024, Normal      levothyroxine 112 mcg tablet Take 1 tablet (112 mcg total) by mouth daily in the early morning, Starting Mon 4/29/2024, Normal      linaCLOtide (Linzess) 290 MCG CAPS Take 1 capsule by mouth daily, Starting Sun 8/18/2024, Until Fri 2/14/2025, Normal      metFORMIN (GLUCOPHAGE) 500 mg tablet Take 1 tablet (500 mg total) by mouth daily with breakfast, Starting Wed 6/26/2024, Normal      Mirabegron ER 50 MG TB24 Take 1 tablet (50 mg total) by mouth in the morning, Starting Sun 9/29/2024, Normal      Multiple Vitamin (MULTIVITAMIN) capsule Take 1 capsule by mouth daily,  Historical Med      Nurtec 75 MG TBDP Take 75 mg by mouth every other day, Starting Tue 9/3/2024, Historical Med      omeprazole (PriLOSEC) 20 mg delayed release capsule Take 1 capsule (20 mg total) by mouth 2 (two) times a day, Starting Mon 6/17/2024, Normal      Semaglutide-Weight Management (WEGOVY) 1.7 MG/0.75ML Inject 0.75 mL (1.7 mg total) under the skin once a week, Starting Tue 9/24/2024, Normal      topiramate (TOPAMAX) 100 mg tablet Take 150 mg by mouth 2 (two) times a day TAKE 1.5 TABLETS BID, Starting Sat 6/1/2024, Historical Med              Discharge instructions/Information to patient and family:   See after visit summary for information provided to patient and family.      Provisions for Follow-Up Care:  See after visit summary for information related to follow-up care and any pertinent home health orders.      Discharge Statement   I spent  35  discharging the patient. This time was spent on the day of discharge. I had direct contact with the patient on the day of discharge. Additional documentation is required if more than 30 minutes were spent on discharge.

## 2024-11-07 ENCOUNTER — OFFICE VISIT (OUTPATIENT)
Dept: FAMILY MEDICINE CLINIC | Facility: CLINIC | Age: 39
End: 2024-11-07
Payer: COMMERCIAL

## 2024-11-07 VITALS
HEART RATE: 57 BPM | SYSTOLIC BLOOD PRESSURE: 116 MMHG | TEMPERATURE: 96.6 F | WEIGHT: 293 LBS | DIASTOLIC BLOOD PRESSURE: 78 MMHG | BODY MASS INDEX: 50.27 KG/M2 | OXYGEN SATURATION: 100 %

## 2024-11-07 DIAGNOSIS — Z09 HOSPITAL DISCHARGE FOLLOW-UP: Primary | ICD-10-CM

## 2024-11-07 PROCEDURE — 99496 TRANSJ CARE MGMT HIGH F2F 7D: CPT | Performed by: FAMILY MEDICINE

## 2024-11-07 NOTE — PROGRESS NOTES
Transition of Care Visit  Name: Gita Rendon      : 1985      MRN: [id number]  Encounter Provider: Jere Dillon MD  Encounter Date: 2024   Encounter department: [facility]    Assessment & Plan  Hospital discharge follow-up  Hospitalized for suicidal ideations for 12 days from 10/24 to   Luvox was discontinued upon discharge.  Patient continues to be on Vraylar and Cymbalta  Follows with therapist once a week and psychiatrist every few weeks.  Last visit yesterday, no new medication changes  Denies any SI/HI or A/P hallucinations  Advised support group and constant psych follow up  Pt refused  referral at this time              History of Present Illness     Transitional Care Management Review:   Gita Rendon is a 39 y.o. female here for TCM follow up.     During the TCM phone call patient stated:  TCM Call       Date and time call was made  2024 12:55 PM    Hospital care reviewed  Records reviewed    Patient was hospitial[facility] at  [facility]    Date of Admission  10/24/24    Date of discharge  24    Diagnosis  Severe episode of recurrent major depressive disorder, without psychotic features    Disposition  Home    Were the patients medications reviewed and updated  Yes    Current Symptoms  None          TCM Call       Post hospital issues  None    Should patient be enrolled in anticoag monitoring?  No    Scheduled for follow up?  Yes    Patients specialists  Other (comment)    Other specialists names  [facility]    Referrals needed  yes    Did you obtain your prescribed medications  Yes    Do you need help managing your prescriptions or medications  No    Is transportation to your appointment needed  No    I have advised the patient to call PCP with any new or worsening symptoms  Loi Amaral    Living Arrangements  Alone    Support System  Family    The type of support provided  Emotional    Do you have social support  Yes, a  little    Are you recieving any outpatient services  Yes    What type of services      Are you recieving home care services  No    Are you using any community resources  No    Current waiver services  No    Have you fallen in the last 12 months  No    How many times  1-dog pulled down    Interperter language line needed  No    Counseling  Patient          39 y.o. female with a history of Major Depressive Disorder and Generalized Anxiety Disorder who was admitted to the inpatient adult psychiatric unit on a voluntary 201 commitment basis due to suicidal ideation. Patient presented to the ED on October 23 with complaints of worsening depression and thoughts of self-harm despite outpatient treatment with ketamine therapy. On evaluation in the inpatient psychiatric unit, pt noted worsening depression for the last year and believes Ketamine therapy weekly has made HA worse. At that time, pt felt hopeless, thoughts of wanting to die, passive SI, felt overwhelmed. Her plan was to take as many pills as she had.   Significant loss since 2021- fathers death, mother ill, now living alone   Inpatient psychiatric admission in July 2024 at [facility]    The patient was admitted to the inpatient psychiatric unit from 10/24 - 11/15 for a total of 12 days  During the hospitalization the patient was attending individual therapy, group therapy, milieu therapy and occupational therapy. Patient was continued on Cymbalta 60 mg twice daily, Vraylar 6 mg daily and Luvox 50 mg at bedtime.  Patient was also on gabapentin 300 mg daily and 900 mg at bedtime.Through hospitalization patient's Luvox was tapered and discontinued as she did not appear to have benefit from the medication.  Topamax 150 mg twice daily was was also continued.     Patient had chronic negative thoughts and self-doubt.  With medication and supportive therapy as well as milieu treatment patient began to show some improvements.  Patient's  "symptoms improved gradually over the hospital course. At the end of treatment the patient was doing well. Mood was stable at the time of discharge. The patient denied suicidal ideation, intent or plan at the time of discharge and denied homicidal ideation, intent or plan at the time of discharge. There was no overt psychosis at the time of discharge. Sleep and appetite were improved. The patient was tolerating medications and was not reporting any significant side effects at the time of discharge.Since the patient was doing well at the end of the hospitalization, treatment team felt that the patient could be safely discharged to outpatient care. The outpatient follow up with  outpatient provider at [facility]  was arranged by the unit  upon discharge.  Luvox and spironolactone were stopped upon discharge.    Today in clinic, patient states that she feels well overall 2 days since she got discharged from the hospital.  Patient denies any active suicidal or homicidal ideations and denies any audio or visual hallucinations.  Patient does not have a support group and although she lives with her roommate, her roommate is always unavailable and she feels like she lives alone.  Patient is looking into volunteering so she will spend more times about side.  Patient is also expecting a peer specialist to help patient with motivation as well as household chores.  Patient has been following up with her therapist every week and psychiatrist every few weeks.  Last seen both of them yesterday.  No medication changes were made at that time.      Of note, patient has an upcoming appointment on 11/11 at [facility] neurology as she states that it is a very difficult drive to [place].     I offered patient's  referral to help with financial difficulties and possible support groups but patient refused at this time stating that \"she has too many things going on.\"        Review of Systems "   Respiratory:  Negative for shortness of breath.    Cardiovascular:  Negative for chest pain.   Psychiatric/Behavioral:  Negative for hallucinations, self-injury, sleep disturbance and suicidal ideas.      Objective     /78   Pulse 57   Temp (!) 96.6 °F (35.9 °C)   Wt (!) 150 kg (330 lb 9.6 oz)   LMP 10/15/2024 (Approximate)   SpO2 100%   BMI 50.27 kg/m²     Physical Exam  Vitals reviewed.   Constitutional:       General: She is not in acute distress.     Appearance: She is obese.   HENT:      Head: Normocephalic.   Eyes:      General: No scleral icterus.     Extraocular Movements: Extraocular movements intact.      Conjunctiva/sclera: Conjunctivae normal.   Cardiovascular:      Rate and Rhythm: Normal rate and regular rhythm.      Pulses: Normal pulses.      Heart sounds: Normal heart sounds. No murmur heard.  Pulmonary:      Effort: Pulmonary effort is normal. No respiratory distress.      Breath sounds: Normal breath sounds. No wheezing.   Abdominal:      Palpations: Abdomen is soft.      Tenderness: There is no abdominal tenderness.   Musculoskeletal:      Right lower leg: No edema.      Left lower leg: No edema.   Skin:     General: Skin is warm.      Capillary Refill: Capillary refill takes less than 2 seconds.   Neurological:      Mental Status: She is alert.      Gait: Gait normal.   Psychiatric:         Mood and Affect: Mood is depressed. Affect is tearful.         Speech: Speech normal.         Behavior: Behavior normal. Behavior is not agitated or aggressive. Behavior is cooperative.         Thought Content: Thought content normal. Thought content does not include homicidal or suicidal ideation. Thought content does not include homicidal or suicidal plan.       Medications have been reviewed by provider in current encounter

## 2024-11-12 ENCOUNTER — OFFICE VISIT (OUTPATIENT)
Dept: URGENT CARE | Facility: CLINIC | Age: 39
End: 2024-11-12
Payer: COMMERCIAL

## 2024-11-12 VITALS
WEIGHT: 293 LBS | TEMPERATURE: 98.2 F | DIASTOLIC BLOOD PRESSURE: 76 MMHG | RESPIRATION RATE: 18 BRPM | BODY MASS INDEX: 45.99 KG/M2 | HEART RATE: 66 BPM | HEIGHT: 67 IN | SYSTOLIC BLOOD PRESSURE: 126 MMHG | OXYGEN SATURATION: 99 %

## 2024-11-12 DIAGNOSIS — J06.9 URI WITH COUGH AND CONGESTION: Primary | ICD-10-CM

## 2024-11-12 DIAGNOSIS — J02.9 SORE THROAT: ICD-10-CM

## 2024-11-12 LAB — S PYO AG THROAT QL: NEGATIVE

## 2024-11-12 PROCEDURE — 87880 STREP A ASSAY W/OPTIC: CPT

## 2024-11-12 PROCEDURE — S9088 SERVICES PROVIDED IN URGENT: HCPCS

## 2024-11-12 PROCEDURE — 99213 OFFICE O/P EST LOW 20 MIN: CPT

## 2024-11-12 RX ORDER — PREDNISONE 20 MG/1
40 TABLET ORAL DAILY
Qty: 10 TABLET | Refills: 0 | Status: SHIPPED | OUTPATIENT
Start: 2024-11-12 | End: 2024-11-17

## 2024-11-12 RX ORDER — BENZONATATE 200 MG/1
200 CAPSULE ORAL 3 TIMES DAILY PRN
Qty: 20 CAPSULE | Refills: 0 | Status: SHIPPED | OUTPATIENT
Start: 2024-11-12

## 2024-11-12 RX ORDER — DIHYDROERGOTAMINE MESYLATE 4 MG/ML
1 SPRAY NASAL AS NEEDED
COMMUNITY
Start: 2024-11-11

## 2024-11-12 NOTE — PROGRESS NOTES
[facility]        NAME: Gita Rendon is a 39 y.o. female  : 1985    MRN: [id number]  DATE: 2024  TIME: 8:48 AM    Assessment and Plan   URI with cough and congestion [J06.9]  1. URI with cough and congestion  XR chest pa and lateral    benzonatate (TESSALON) 200 MG capsule    predniSONE 20 mg tablet      2. Sore throat  POCT rapid ANTIGEN strepA        Provider Radiology Interpretation (preliminary)   Final results will be as per official Radiology Report when available:   CHEST: no pneumonia    POCT Strep: negative    Will place on prednisone and advise patient to take OTC medication for sinus congestion. Will prescribe med for cough. Advised follow up with pcp in 3-5 days if not feeling better. If symptoms are worsening advised follow up sooner or breathing difficulties advised to go to the ER.     Patient Instructions       Follow up with PCP in 3-5 days.  Proceed to  ER if symptoms worsen.    If tests are performed, our office will contact you with results only if changes need to made to the care plan discussed with you at the visit. You can review your full results on [facility].    Chief Complaint     Chief Complaint   Patient presents with    Cold Like Symptoms    Sore Throat    Cough     With chest congestion  Productive for yellow/greenish phlegm  Keeping up at night  Chest feels tight         History of Present Illness       39-year-old female patient presents to this clinic with complaint of sore throat, cough, chest congestion.  Cough is productive for yellow-green phlegm.  Patient reports the cough is keeping her awake at night and her chest feels tight.  Patient reports she was recently released from being hospitalized.    Sore Throat   Associated symptoms include coughing.   Cough  Associated symptoms include a sore throat.       Review of Systems   Review of Systems   HENT:  Positive for sore throat.    Respiratory:  Positive for cough.          Current  Medications       Current Outpatient Medications:     acetaZOLAMIDE (DIAMOX) 250 mg tablet, Take 2 tablets (500 mg total) by mouth 2 (two) times a day, Disp: , Rfl:     Ajovy 225 MG/1.5ML auto-injector, Inject 225 mg under the skin every 30 (thirty) days Last  weeks  not  sure  what day, Disp: , Rfl:     benzonatate (TESSALON) 200 MG capsule, Take 1 capsule (200 mg total) by mouth 3 (three) times a day as needed for cough, Disp: 20 capsule, Rfl: 0    cariprazine (Vraylar) 6 MG capsule, Take 1 capsule (6 mg total) by mouth daily, Disp: 30 capsule, Rfl: 0    Cequa 0.09 % SOLN, Apply 1 drop to eye 2 (two) times a day, Disp: 60 each, Rfl: 0    cetirizine (ZyrTEC) 10 MG chewable tablet, Chew 10 mg daily, Disp: , Rfl:     dihydroergotamine (MIGRANAL) 4 MG/ML nasal spray, 1 spray into each nostril as needed, Disp: , Rfl:     docusate sodium (COLACE) 100 mg capsule, Take 100 mg by mouth 2 (two) times a day, Disp: , Rfl:     DULoxetine (CYMBALTA) 60 mg delayed release capsule, Take 2 capsules (120 mg total) by mouth daily, Disp: 60 capsule, Rfl: 0    gabapentin (NEURONTIN) 600 MG tablet, Take 1.5 tablets (900 mg total) by mouth daily at bedtime, Disp: 45 tablet, Rfl: 0    levothyroxine 112 mcg tablet, Take 1 tablet (112 mcg total) by mouth daily in the early morning, Disp: 90 tablet, Rfl: 1    linaCLOtide (Linzess) 290 MCG CAPS, Take 1 capsule by mouth daily, Disp: 90 capsule, Rfl: 0    metFORMIN (GLUCOPHAGE) 500 mg tablet, Take 1 tablet (500 mg total) by mouth daily with breakfast, Disp: 90 tablet, Rfl: 1    Mirabegron ER 50 MG TB24, Take 1 tablet (50 mg total) by mouth in the morning, Disp: 30 tablet, Rfl: 0    Multiple Vitamin (MULTIVITAMIN) capsule, Take 1 capsule by mouth daily, Disp: , Rfl:     Nurtec 75 MG TBDP, Take 75 mg by mouth every other day, Disp: , Rfl:     omeprazole (PriLOSEC) 20 mg delayed release capsule, Take 1 capsule (20 mg total) by mouth 2 (two) times a day, Disp: 180 capsule, Rfl: 1    predniSONE 20  mg tablet, Take 2 tablets (40 mg total) by mouth daily for 5 days, Disp: 10 tablet, Rfl: 0    Semaglutide-Weight Management (WEGOVY) 1.7 MG/0.75ML, Inject 0.75 mL (1.7 mg total) under the skin once a week, Disp: 3 mL, Rfl: 3    topiramate (TOPAMAX) 100 mg tablet, Take 150 mg by mouth 2 (two) times a day TAKE 1.5 TABLETS BID, Disp: , Rfl:     acetaZOLAMIDE (DIAMOX) 250 mg tablet, Take 1 tablet (250 mg total) by mouth 2 (two) times a day 2 tablets two times a day, Disp: , Rfl:     celecoxib (CeleBREX) 200 mg capsule, Take 1 capsule (200 mg total) by mouth 2 (two) times a day, Disp: 60 capsule, Rfl: 0    gabapentin (NEURONTIN) 300 mg capsule, Take 1 capsule (300 mg total) by mouth daily after breakfast, Disp: , Rfl:     Current Allergies     Allergies as of 11/12/2024 - Reviewed 11/12/2024   Allergen Reaction Noted    Doxycycline  03/28/2019    Other Other (See Comments) 02/26/2018            The following portions of the patient's history were reviewed and updated as appropriate: allergies, current medications, past family history, past medical history, past social history, past surgical history and problem list.     Past Medical History:   Diagnosis Date    Allergic     Allergic rhinitis     Anorexia nervosa in remission     Anxiety     Arthritis 5/8/2014    Back pain     Bipolar disorder (HCC)     Depression     Dermatitis 10/16/2021    Disease of thyroid gland     Diverticulitis of colon 9/20/2015    Dizziness     Ear problems     Eating disorder     Esophageal reflux 08/15/2013    GERD (gastroesophageal reflux disease) 8/15/2013    Headache(784.0)     Headache, tension-type     Hypertension     Hypothyroid     Idiopathic intracranial hypertension     Lumbar degenerative disc disease 05/08/2014    Migraine     Nasal congestion     Nosebleed     Obesity     Obsessive-compulsive disorder     Otitis media     Overactive bladder     Psychiatric disorder     Psychiatric illness     Restless leg syndrome, controlled      "Seasonal allergies     Sinusitis     Sleep apnea     Sleep difficulties     Suicide and self-inflicted injury (HCC)     Tinnitus     TMJ dysfunction     Tonsillitis     Transcranial Magnetic Stimulation 09/06/2021    Urinary tract infection     Vision loss        Past Surgical History:   Procedure Laterality Date    DENTAL SURGERY      wisdom teeth-at [age] years. Other surgery dental extraxtion of bone spur (10 years ago)    IR LUMBAR PUNCTURE  02/26/2019    SINUS ENDOSCOPY      SINUS SURGERY      TONSILLECTOMY         Family History   Problem Relation Age of Onset    Mental illness Mother     Depression Mother     Suicide Attempts Mother     Hypertension Mother     Diabetes Mother     Other Mother         bicuspid aortic valve    Anxiety disorder Mother     Psychiatric Illness Mother     Schizoaffective Disorder  Mother     Anemia Mother     Hypertension Father     Hypothyroidism Father     Thyroid disease Father     Hypertension Brother     Cancer Maternal Grandmother     Heart disease Maternal Grandmother     Stroke Maternal Grandmother     Breast cancer Maternal Grandmother     Skin cancer Maternal Grandmother     Arthritis Maternal Grandmother     Pneumonia Maternal Grandfather     Cancer Maternal Grandfather     Dementia Maternal Grandfather     COPD Maternal Grandfather     Cancer Paternal Grandmother     Pneumonia Paternal Grandfather     Arthritis Family     Breast cancer Family     Osteopenia Family     Osteoporosis Family     Hypertension Family     Transient ischemic attack Family          Medications have been verified.        Objective   /76   Pulse 66   Temp 98.2 °F (36.8 °C)   Resp 18   Ht 5' 7\" (1.702 m)   Wt (!) 147 kg (325 lb)   LMP 11/12/2024 (Exact Date)   SpO2 99%   BMI 50.90 kg/m²        Physical Exam     Physical Exam  Vitals and nursing note reviewed.   Constitutional:       Appearance: She is well-developed. She is obese.   HENT:      Head: Normocephalic and atraumatic.      " Right Ear: Tympanic membrane, ear canal and external ear normal.      Left Ear: Tympanic membrane, ear canal and external ear normal.      Nose: Congestion present.      Right Turbinates: Swollen.      Left Turbinates: Swollen.      Right Sinus: No maxillary sinus tenderness or frontal sinus tenderness.      Left Sinus: No maxillary sinus tenderness or frontal sinus tenderness.      Mouth/Throat:      Lips: Pink. No lesions.      Mouth: Mucous membranes are moist.      Pharynx: Oropharynx is clear. Uvula midline. Posterior oropharyngeal erythema and postnasal drip present. No pharyngeal swelling, oropharyngeal exudate or uvula swelling.      Comments: Tonsils surgically absent  Eyes:      Extraocular Movements: Extraocular movements intact.      Conjunctiva/sclera: Conjunctivae normal.      Pupils: Pupils are equal, round, and reactive to light.   Cardiovascular:      Rate and Rhythm: Normal rate and regular rhythm.      Pulses: Normal pulses.      Heart sounds: Normal heart sounds.   Pulmonary:      Effort: Pulmonary effort is normal.      Breath sounds: Normal breath sounds.      Comments: Frequent, harsh cough  Abdominal:      General: Bowel sounds are normal.      Palpations: Abdomen is soft.   Musculoskeletal:      Cervical back: Normal range of motion and neck supple.   Skin:     General: Skin is warm and dry.      Capillary Refill: Capillary refill takes less than 2 seconds.   Neurological:      General: No focal deficit present.      Mental Status: She is alert and oriented to person, place, and time.

## 2024-11-12 NOTE — PATIENT INSTRUCTIONS
Your xray report will have a final reading by a radiologist in the next 24 hours. If there is anything abnormal, the staff will be in contact with you regarding an updated plan of care.    Most upper respiratory infections are viral and resolve on their own within 10-14 days. Antibiotics are not indicated for the viral infection, and are only prescribed if there is evidence for a bacterial infection. Sometimes an upper respiratory infection may lead to secondary bacterial infection, such as bacterial sinusitis, in which case antibiotics would be indicated at that time. If your symptoms continue beyond 10-14 days or if you experience ongoing fevers, productive cough with green, brown, bloody phlegm production, you may have developed a bacterial infection. For the uncomplicated viral upper respiratory infection conservative management includes:     Fever and pain control:  Ibuprofen (Motrin) 600mg every 6 hours for fever, headaches, body aches   Ibuprofen is an NSAID. Please stop medication if you experience stomach/abdominal pain and report to your primary care provider.   Ask your primary care provider before you take NSAIDs if you are on any blood thinners, or if you have a history of heart disease, kidney disease, gastric bypass surgery, GI bleed, or poorly controlled high blood pressure.   May use acetaminophen (Tylenol) as directed on the bottle between doses of ibuprofen. Do not exceed 4,000mg of Tylenol a day.   Cough & Congestion:  Guaifenesin (Mucinex) as directed on the bottle for congestion and mucous-y cough.   Dextromethorphan (Delsym, Robitussin) for dry cough and cough suppression   Pseudoephedrine (Sudafed) for congestion and sinus pressure   Sudafed may cause increased heart rate, irregular heart rate, and an increase in blood pressure. Please do not take Sudafed if you have a history of heart disease or high blood pressure.   Sudafed should not be taken if you are on anti-depressants such as those  belonging to the class MAOIs or tricyclics.  Coricidin HBP (chlorpheniramine maleate) can be used as a decongestant in place of other options for those unable to take Sudafed.   Combination cough and cold such as Dimetapp and Mucinex DM also available  Sudafed PE Head Congestion +Flu Severe contains a combination of Sudafed, Tylenol, Mucinex, and Delsym  If prescribed, take Tessalon Pearles or Bromfed/Phenergan DM as directed  Avoid taking prescription cough/congestion medication and OTC options at the same time  Sore Throat:  Cepacol lozenges  Chloraseptic spray  Throat Coat tea  Warm salt water gargles   Vitamin/Minerals:  Vitamin D3 2,000 IU daily  Vitamin C 1000mg twice a day  Some studies suggest that Zinc 12.5-15mg every 2 hours while awake for 5 days may shorten symptom duration by 1-2 days  Other:   Plenty of fluids and rest  Cool mist humidifiers  Nasal sinus rinses such as NettiPot, Neimed, or Navage can be used to help flush out sinuses  Please only use distilled/sterile water that can be purchased at your local pharmacy  Nasal spray options:  Nasal steroid sprays such as Flonase, Nasonex, Nasacort may help with sinus congestion, itchy/watery eyes, clogged ears  These options must be used consistently for at least 2 weeks for full effect  Afrin nasal spray for quick acting congestion relief  Saline nasal spray for dry nose, irritation of the nasal passages  Follow up with PCP in 3-5 days  Proceed to the ED if symptoms worsen

## 2024-11-13 DIAGNOSIS — N32.81 OVERACTIVE BLADDER: ICD-10-CM

## 2024-11-14 ENCOUNTER — TELEPHONE (OUTPATIENT)
Age: 39
End: 2024-11-14

## 2024-11-14 RX ORDER — MIRABEGRON 50 MG/1
50 TABLET, FILM COATED, EXTENDED RELEASE ORAL DAILY
Qty: 30 TABLET | Refills: 5 | Status: SHIPPED | OUTPATIENT
Start: 2024-11-14

## 2024-11-14 NOTE — TELEPHONE ENCOUNTER
Patient called stating she spoke with office staff regarding forms for urinary supplies being faxed to [facility].     I informed patient fax was sent on 11/12 at 1508. I spoke with office staff who stated they will fax again today 11/14.    Patient expressed understanding.

## 2024-11-18 ENCOUNTER — OFFICE VISIT (OUTPATIENT)
Dept: FAMILY MEDICINE CLINIC | Facility: CLINIC | Age: 39
End: 2024-11-18
Payer: COMMERCIAL

## 2024-11-18 ENCOUNTER — TELEPHONE (OUTPATIENT)
Age: 39
End: 2024-11-18

## 2024-11-18 VITALS
SYSTOLIC BLOOD PRESSURE: 110 MMHG | OXYGEN SATURATION: 100 % | BODY MASS INDEX: 45.99 KG/M2 | HEART RATE: 65 BPM | DIASTOLIC BLOOD PRESSURE: 68 MMHG | HEIGHT: 67 IN | TEMPERATURE: 97.8 F | WEIGHT: 293 LBS

## 2024-11-18 DIAGNOSIS — J01.90 ACUTE NON-RECURRENT SINUSITIS, UNSPECIFIED LOCATION: Primary | ICD-10-CM

## 2024-11-18 PROCEDURE — G2211 COMPLEX E/M VISIT ADD ON: HCPCS | Performed by: PHYSICIAN ASSISTANT

## 2024-11-18 PROCEDURE — 99214 OFFICE O/P EST MOD 30 MIN: CPT | Performed by: PHYSICIAN ASSISTANT

## 2024-11-18 NOTE — PROGRESS NOTES
"Name: Gita Rendon      : 1985      MRN: [id number]  Encounter Provider: Luis Rosenberg PA-C  Encounter Date: 2024   Encounter department: [facility]  :  Assessment & Plan  Acute non-recurrent sinusitis, unspecified location   - 10 days of sinus congestion, pressure, rhinorrhea, sinus pain.    - No known fevers. Denies nausea, vomiting diarrhea.    - Was treated by urgent care on  w/ tessalon pearles, steroid course which was not effective   - physical exam significant for cervical lymphadenopathy, sinus tenderness   - PCP to prescribe 1 week ABX course    - Return to clinic if symptoms worsen or fail to improve.   Orders:    amoxicillin-clavulanate (AUGMENTIN) 875-125 mg per tablet; Take 1 tablet by mouth every 12 (twelve) hours for 7 days           History of Present Illness     URI   This is a new problem. Episode onset: 10 days ago. The problem has been gradually worsening. There has been no fever. Associated symptoms include congestion, coughing, rhinorrhea and sinus pain. Pertinent negatives include no chest pain, diarrhea, headaches, nausea or vomiting. Treatments tried: steroid, tessalon pearles. The treatment provided no relief.     Review of Systems   Constitutional:  Negative for fatigue and fever.   HENT:  Positive for congestion, rhinorrhea and sinus pain.    Respiratory:  Positive for cough. Negative for shortness of breath.    Cardiovascular:  Negative for chest pain.   Gastrointestinal:  Negative for diarrhea, nausea and vomiting.   Neurological:  Negative for headaches.          Objective   /68 (BP Location: Left arm, Patient Position: Sitting, Cuff Size: Adult)   Pulse 65   Temp 97.8 °F (36.6 °C) (Tympanic)   Ht 5' 7\" (1.702 m)   Wt (!) 147 kg (325 lb)   LMP 2024 (Exact Date)   SpO2 100%   BMI 50.90 kg/m²      Physical Exam  Constitutional:       Appearance: Normal appearance.   HENT:      Head: Normocephalic.      Comments: " Frontal sinus tenderness      Right Ear: External ear normal.      Left Ear: External ear normal.      Nose: Nose normal.      Mouth/Throat:      Mouth: Mucous membranes are moist.      Pharynx: Oropharynx is clear.      Comments: Tonsils surgically removed  Eyes:      Conjunctiva/sclera: Conjunctivae normal.   Cardiovascular:      Rate and Rhythm: Normal rate and regular rhythm.      Heart sounds: Normal heart sounds.   Pulmonary:      Effort: Pulmonary effort is normal.      Breath sounds: Normal breath sounds.   Abdominal:      General: Bowel sounds are normal.      Palpations: Abdomen is soft.   Lymphadenopathy:      Cervical: Cervical adenopathy present.   Neurological:      Mental Status: She is alert and oriented to person, place, and time.   Psychiatric:         Behavior: Behavior normal.

## 2024-11-18 NOTE — TELEPHONE ENCOUNTER
Pt called to report she has been sick x 10 days.  Pt seen in UC on 11/12/24.  Given steroids and Tessalon pearls at that time.  Pt states she feels worse.  Increased chest congestion and now possibly a sinus infection.  Appt made with Luis CONLEY on 11/18/24 at 1020.  Please review.

## 2024-11-19 DIAGNOSIS — E66.01 MORBID OBESITY (HCC): Primary | ICD-10-CM

## 2024-11-21 ENCOUNTER — TELEPHONE (OUTPATIENT)
Dept: FAMILY MEDICINE CLINIC | Facility: CLINIC | Age: 39
End: 2024-11-21

## 2024-11-23 DIAGNOSIS — E03.9 ACQUIRED HYPOTHYROIDISM: ICD-10-CM

## 2024-11-25 ENCOUNTER — TELEPHONE (OUTPATIENT)
Dept: ENDOCRINOLOGY | Facility: CLINIC | Age: 39
End: 2024-11-25

## 2024-11-25 RX ORDER — LEVOTHYROXINE SODIUM 112 UG/1
112 TABLET ORAL
Qty: 90 TABLET | Refills: 1 | Status: SHIPPED | OUTPATIENT
Start: 2024-11-25

## 2024-11-26 ENCOUNTER — TELEPHONE (OUTPATIENT)
Age: 39
End: 2024-11-26

## 2024-11-29 ENCOUNTER — TELEPHONE (OUTPATIENT)
Dept: BARIATRICS | Facility: CLINIC | Age: 39
End: 2024-11-29

## 2024-11-29 NOTE — TELEPHONE ENCOUNTER
Pt is calling back in regards to her prior auth for Wegovy 2.4 mg. Patient was suppose to receive a call back from the practice and never did regarding a survey. Patient is stating that she has already missed a few dosages and she need her medication. Please contact pt as soon as possible. Thanks

## 2024-12-02 NOTE — TELEPHONE ENCOUNTER
Medication approved. Letter in media.  
Patient called stated her insurance just informed her the prior authorization for Wegovy medication is still in process, due to forms was not fully complete also they didn't received the survey back. Please call patient at [phone]  
Pt called in again, to f/u on Wegovy 2.4 Prior auth. Pt does not have any pen left. Pt was told will receive a call from the practice. Pt would appreciate if this is handle as urgent.   
Re-initiated auth urgently on covermymeds.    Awaiting determination    Key [id number]  
 used

## 2024-12-06 ENCOUNTER — OFFICE VISIT (OUTPATIENT)
Dept: FAMILY MEDICINE CLINIC | Facility: CLINIC | Age: 39
End: 2024-12-06
Payer: COMMERCIAL

## 2024-12-06 ENCOUNTER — TELEPHONE (OUTPATIENT)
Dept: FAMILY MEDICINE CLINIC | Facility: CLINIC | Age: 39
End: 2024-12-06

## 2024-12-06 VITALS
HEIGHT: 67 IN | TEMPERATURE: 96.4 F | WEIGHT: 293 LBS | BODY MASS INDEX: 45.99 KG/M2 | HEART RATE: 69 BPM | OXYGEN SATURATION: 98 % | DIASTOLIC BLOOD PRESSURE: 78 MMHG | SYSTOLIC BLOOD PRESSURE: 112 MMHG

## 2024-12-06 DIAGNOSIS — G43.819 OTHER MIGRAINE WITHOUT STATUS MIGRAINOSUS, INTRACTABLE: Primary | ICD-10-CM

## 2024-12-06 PROCEDURE — 99214 OFFICE O/P EST MOD 30 MIN: CPT | Performed by: FAMILY MEDICINE

## 2024-12-06 PROCEDURE — G2211 COMPLEX E/M VISIT ADD ON: HCPCS | Performed by: FAMILY MEDICINE

## 2024-12-06 RX ORDER — PREDNISONE 20 MG/1
20 TABLET ORAL DAILY
Qty: 5 TABLET | Refills: 0 | Status: SHIPPED | OUTPATIENT
Start: 2024-12-06 | End: 2024-12-11

## 2024-12-06 RX ORDER — NARATRIPTAN 2.5 MG/1
TABLET ORAL
COMMUNITY

## 2024-12-06 NOTE — ASSESSMENT & PLAN NOTE
- Patient has upcoming Vyepti infusion on 12/12, PCP agreed given migraine issues that would be amenable to trial of steroid  - PCP office has reach out to speciality to ensure no contraindications for steroid given close proximity of infusion  Orders:    predniSONE 20 mg tablet; Take 1 tablet (20 mg total) by mouth daily for 5 days

## 2024-12-06 NOTE — TELEPHONE ENCOUNTER
Dr. Schuster is seeing the patient in her office today. She is asking if the patient can be prescribed 5 day coarse of steroids before her next infusion to be done next week. She wants to be sure there are no restrictions. Jason Thomas     Need to go to neurology [facility]. They just do the infusion.      The recommendation would need to come form [facility]. The call wass then forwarded internally to Libby from that department to confirm if it is okay to have the steroid. Libby took my message to send to the Provider for recommendation. I did make her aware the patient is in the office at this time. Dr. Schuster will discuss with the patient.     If the Provider's office calls back. Please put through to the office. If Ayesha is not available ask for Xochitl or Katrina. Alternatively, if there is a message after hours, please send the message directly to Dr. Schuster as well as [facility].     Thank you

## 2024-12-06 NOTE — PROGRESS NOTES
"Name: Gita Rendon      : 1985      MRN: [id number]  Encounter Provider: Norma Traore MD  Encounter Date: 2024   Encounter department: [facility]  :  Assessment & Plan  Other migraine without status migrainosus, intractable  - Patient has upcoming Vyepti infusion on , PCP agreed given migraine issues that would be amenable to trial of steroid  - PCP office has reach out to speciality to ensure no contraindications for steroid given close proximity of infusion  Orders:    predniSONE 20 mg tablet; Take 1 tablet (20 mg total) by mouth daily for 5 days           History of Present Illness     Patient presents with headache which has been present x 1 week  Patient describing an almost malar type rash 2-3 times in the last 3 months, pic on phone. Typically the rash has preceded the headache with each incident. Patient reports that during this last headache episode she started yawning over and over,despite not being truly tired. Sat into Sun she reports that she felt sleepy and would get up only to go to the bathroom. She also reports episodes of flashes of light with every time she woke up, when trying to walk down the rabago. She reports \"sparkles of light that were blue/white, coming in and out\". Headache was painful all over more so on Tuesday. Pain on that day was about a 6-7/10.Pain is a 6/10 frontal pain and occipital region and behind the eyes.      Review of Systems   Neurological:  Positive for headaches.          Objective   /78 (BP Location: Right arm, Patient Position: Sitting, Cuff Size: Extra-Large)   Pulse 69   Temp (!) 96.4 °F (35.8 °C) (Tympanic)   Ht 5' 7\" (1.702 m)   Wt (!) 152 kg (335 lb 3.2 oz)   LMP 2024 (Exact Date)   SpO2 98%   BMI 52.50 kg/m²      Physical Exam  Constitutional:       Appearance: Normal appearance.   HENT:      Head: Normocephalic and atraumatic.      Right Ear: External ear normal.      Left Ear: External ear " normal.   Eyes:      Extraocular Movements: Extraocular movements intact.      Conjunctiva/sclera: Conjunctivae normal.      Pupils: Pupils are equal, round, and reactive to light.   Cardiovascular:      Rate and Rhythm: Normal rate and regular rhythm.   Neurological:      Mental Status: She is alert and oriented to person, place, and time.   Psychiatric:         Mood and Affect: Mood normal.         Behavior: Behavior normal.

## 2024-12-08 ENCOUNTER — HOSPITAL ENCOUNTER (EMERGENCY)
Facility: HOSPITAL | Age: 39
Discharge: HOME | End: 2024-12-08
Attending: EMERGENCY MEDICINE
Payer: COMMERCIAL

## 2024-12-08 VITALS
OXYGEN SATURATION: 100 % | TEMPERATURE: 97.4 F | DIASTOLIC BLOOD PRESSURE: 88 MMHG | WEIGHT: 293 LBS | SYSTOLIC BLOOD PRESSURE: 161 MMHG | BODY MASS INDEX: 52.48 KG/M2 | RESPIRATION RATE: 18 BRPM | HEART RATE: 88 BPM

## 2024-12-08 DIAGNOSIS — G43.909 MIGRAINE: Primary | ICD-10-CM

## 2024-12-08 LAB
FLUAV AG UPPER RESP QL IA.RAPID: NEGATIVE
FLUBV AG UPPER RESP QL IA.RAPID: NEGATIVE
SARS-COV+SARS-COV-2 AG RESP QL IA.RAPID: NEGATIVE

## 2024-12-08 PROCEDURE — 87804 INFLUENZA ASSAY W/OPTIC: CPT | Performed by: PHYSICIAN ASSISTANT

## 2024-12-08 PROCEDURE — 96375 TX/PRO/DX INJ NEW DRUG ADDON: CPT

## 2024-12-08 PROCEDURE — 96365 THER/PROPH/DIAG IV INF INIT: CPT

## 2024-12-08 PROCEDURE — 96361 HYDRATE IV INFUSION ADD-ON: CPT

## 2024-12-08 PROCEDURE — 96372 THER/PROPH/DIAG INJ SC/IM: CPT

## 2024-12-08 PROCEDURE — 99284 EMERGENCY DEPT VISIT MOD MDM: CPT | Performed by: PHYSICIAN ASSISTANT

## 2024-12-08 PROCEDURE — 99284 EMERGENCY DEPT VISIT MOD MDM: CPT

## 2024-12-08 PROCEDURE — 87811 SARS-COV-2 COVID19 W/OPTIC: CPT | Performed by: PHYSICIAN ASSISTANT

## 2024-12-08 RX ORDER — SUMATRIPTAN 6 MG/.5ML
6 INJECTION, SOLUTION SUBCUTANEOUS ONCE
Status: COMPLETED | OUTPATIENT
Start: 2024-12-08 | End: 2024-12-08

## 2024-12-08 RX ORDER — ONDANSETRON 2 MG/ML
4 INJECTION INTRAMUSCULAR; INTRAVENOUS ONCE
Status: COMPLETED | OUTPATIENT
Start: 2024-12-08 | End: 2024-12-08

## 2024-12-08 RX ORDER — DIPHENHYDRAMINE HYDROCHLORIDE 50 MG/ML
25 INJECTION INTRAMUSCULAR; INTRAVENOUS ONCE
Status: COMPLETED | OUTPATIENT
Start: 2024-12-08 | End: 2024-12-08

## 2024-12-08 RX ORDER — MAGNESIUM SULFATE HEPTAHYDRATE 40 MG/ML
2 INJECTION, SOLUTION INTRAVENOUS ONCE
Status: COMPLETED | OUTPATIENT
Start: 2024-12-08 | End: 2024-12-08

## 2024-12-08 RX ORDER — METOCLOPRAMIDE HYDROCHLORIDE 5 MG/ML
10 INJECTION INTRAMUSCULAR; INTRAVENOUS ONCE
Status: COMPLETED | OUTPATIENT
Start: 2024-12-08 | End: 2024-12-08

## 2024-12-08 RX ORDER — KETOROLAC TROMETHAMINE 30 MG/ML
30 INJECTION, SOLUTION INTRAMUSCULAR; INTRAVENOUS ONCE
Status: COMPLETED | OUTPATIENT
Start: 2024-12-08 | End: 2024-12-08

## 2024-12-08 RX ADMIN — SODIUM CHLORIDE 1000 ML: 0.9 INJECTION, SOLUTION INTRAVENOUS at 18:14

## 2024-12-08 RX ADMIN — METOCLOPRAMIDE 10 MG: 5 INJECTION, SOLUTION INTRAMUSCULAR; INTRAVENOUS at 18:21

## 2024-12-08 RX ADMIN — ONDANSETRON 4 MG: 2 INJECTION INTRAMUSCULAR; INTRAVENOUS at 19:38

## 2024-12-08 RX ADMIN — SUMATRIPTAN 6 MG: 6 INJECTION SUBCUTANEOUS at 18:19

## 2024-12-08 RX ADMIN — DIPHENHYDRAMINE HYDROCHLORIDE 25 MG: 50 INJECTION, SOLUTION INTRAMUSCULAR; INTRAVENOUS at 18:17

## 2024-12-08 RX ADMIN — MAGNESIUM SULFATE HEPTAHYDRATE 2 G: 40 INJECTION, SOLUTION INTRAVENOUS at 18:15

## 2024-12-08 RX ADMIN — KETOROLAC TROMETHAMINE 30 MG: 30 INJECTION, SOLUTION INTRAMUSCULAR at 18:17

## 2024-12-08 NOTE — ED PROVIDER NOTES
Time reflects when diagnosis was documented in both MDM as applicable and the Disposition within this note       Time User Action Codes Description Comment    12/8/2024  8:02 PM Stewart Wu Add [G43.909] Migraine           ED Disposition       ED Disposition   Discharge    Condition   Stable    Date/Time   Sun Dec 8, 2024  8:02 PM    Comment   Gita UNIQUE Rendon discharge to home/self care.                   Assessment & Plan       Medical Decision Making  39-year-old female here for evaluation of headache.  She had imaging within the last few months that was negative for the headache.  Denies trauma.    Differential diagnosis includes intracranial hemorrhage or hematoma, migraine headache, tension headache cluster headache is reasonable.,     Patient notes improvement with the migraine cocktail.  Stable for discharge home.    Amount and/or Complexity of Data Reviewed  Labs: ordered. Decision-making details documented in ED Course.    Risk  Prescription drug management.        ED Course as of 12/08/24 2004   Sun Dec 08, 2024   1818 SpO2: 100 %   1818 Respirations: 20   1818 Pulse: 70   1818 Temperature(!): 97.4 °F (36.3 °C)   1818 Blood Pressure: 161/88   1936 Influenza B Rapid Antigen: Negative   1936 Influenza A Rapid Antigen: Negative   1936 SARS COV Rapid Antigen: Negative       Medications   ketorolac (TORADOL) injection 30 mg (30 mg Intravenous Given 12/8/24 1817)   metoclopramide (REGLAN) injection 10 mg (10 mg Intravenous Given 12/8/24 1821)   diphenhydrAMINE (BENADRYL) injection 25 mg (25 mg Intravenous Given 12/8/24 1817)   SUMAtriptan (IMITREX) subcutaneous injection 6 mg (6 mg Subcutaneous Given 12/8/24 1819)   magnesium sulfate 2 g/50 mL IVPB (premix) 2 g (0 g Intravenous Stopped 12/8/24 1915)   sodium chloride 0.9 % bolus 1,000 mL (0 mL Intravenous Stopped 12/8/24 2000)   ondansetron (ZOFRAN) injection 4 mg (4 mg Intravenous Given 12/8/24 1938)       ED Risk Strat Scores                            SBIRT 20yo+      Flowsheet Row Most Recent Value   Initial Alcohol Screen: US AUDIT-C     1. How often do you have a drink containing alcohol? 0 Filed at: 12/08/2024 1800   2. How many drinks containing alcohol do you have on a typical day you are drinking?  0 Filed at: 12/08/2024 1800   3a. Male UNDER 65: How often do you have five or more drinks on one occasion? 0 Filed at: 12/08/2024 1800   3b. FEMALE Any Age, or MALE 65+: How often do you have 4 or more drinks on one occassion? 0 Filed at: 12/08/2024 1800   Audit-C Score 0 Filed at: 12/08/2024 1800   JENNIFER: How many times in the past year have you...    Used an illegal drug or used a prescription medication for non-medical reasons? Never Filed at: 12/08/2024 1800                            History of Present Illness       Chief Complaint   Patient presents with    Headache     Headache since thanksgiving and keeps progressing. Saw PCP 3 days ago and was going to give steroids but didnt       Past Medical History:   Diagnosis Date    Allergic     Allergic rhinitis     Anorexia nervosa in remission     Anxiety     Arthritis 5/8/2014    Back pain     Bipolar disorder (HCC)     Depression     Dermatitis 10/16/2021    Disease of thyroid gland     Diverticulitis of colon 9/20/2015    Dizziness     Ear problems     Eating disorder     Esophageal reflux 08/15/2013    GERD (gastroesophageal reflux disease) 8/15/2013    Headache(784.0)     Headache, tension-type     Hypertension     Hypothyroid     Idiopathic intracranial hypertension     Lumbar degenerative disc disease 05/08/2014    Migraine     Nasal congestion     Nosebleed     Obesity     Obsessive-compulsive disorder     Otitis media     Overactive bladder     Psychiatric disorder     Psychiatric illness     Restless leg syndrome, controlled     Seasonal allergies     Sinusitis     Sleep apnea     Sleep difficulties     Suicide and self-inflicted injury (HCC)     Tinnitus     TMJ dysfunction      Tonsillitis     Transcranial Magnetic Stimulation 09/06/2021    Urinary tract infection     Vision loss       Past Surgical History:   Procedure Laterality Date    DENTAL SURGERY      wisdom teeth-at [age] years. Other surgery dental extraxtion of bone spur (10 years ago)    IR LUMBAR PUNCTURE  02/26/2019    SINUS ENDOSCOPY      SINUS SURGERY      TONSILLECTOMY        Family History   Problem Relation Age of Onset    Mental illness Mother     Depression Mother     Suicide Attempts Mother     Hypertension Mother     Diabetes Mother     Other Mother         bicuspid aortic valve    Anxiety disorder Mother     Psychiatric Illness Mother     Schizoaffective Disorder  Mother     Anemia Mother     Hypertension Father     Hypothyroidism Father     Thyroid disease Father     Hypertension Brother     Cancer Maternal Grandmother     Heart disease Maternal Grandmother     Stroke Maternal Grandmother     Breast cancer Maternal Grandmother     Skin cancer Maternal Grandmother     Arthritis Maternal Grandmother     Pneumonia Maternal Grandfather     Cancer Maternal Grandfather     Dementia Maternal Grandfather     COPD Maternal Grandfather     Cancer Paternal Grandmother     Pneumonia Paternal Grandfather     Arthritis Family     Breast cancer Family     Osteopenia Family     Osteoporosis Family     Hypertension Family     Transient ischemic attack Family       Social History     Tobacco Use    Smoking status: Never     Passive exposure: Never    Smokeless tobacco: Never   Vaping Use    Vaping status: Never Used   Substance Use Topics    Alcohol use: Never    Drug use: Never      E-Cigarette/Vaping    E-Cigarette Use Never User       E-Cigarette/Vaping Substances    Nicotine No     THC No     CBD No     Flavoring No     Other No     Unknown No       I have reviewed and agree with the history as documented.     Patient presents to the emergency department today via private vehicle for evaluation of ongoing headache.  Headaches  been ongoing for more than 10 days at this point.  She states she has a history of migraine headaches and it feels similar.  Pain is not the posterior aspect of the head as well as behind the bilateral eyes.  It is associated with nausea and photophobia as well.  She has a longstanding history of migraine she takes both preventative and abortive medications without relief.  She is scheduled to start new medication through [facility] neurology shortly for migraines.  No fever.  No neck stiffness.      Headache  Associated symptoms: photophobia    Associated symptoms: no abdominal pain, no cough, no dizziness, no fever, no neck stiffness, no numbness, no seizures, no sore throat, no vomiting and no weakness        Review of Systems   Constitutional: Negative.  Negative for chills and fever.   HENT: Negative.  Negative for sore throat and trouble swallowing.    Eyes:  Positive for photophobia.   Respiratory: Negative.  Negative for cough, shortness of breath and wheezing.    Cardiovascular: Negative.  Negative for chest pain and leg swelling.   Gastrointestinal: Negative.  Negative for abdominal pain, blood in stool and vomiting.   Endocrine: Negative.    Genitourinary: Negative.    Musculoskeletal: Negative.  Negative for neck stiffness.   Skin: Negative.    Allergic/Immunologic: Negative.    Neurological:  Negative for dizziness, seizures, speech difficulty, weakness, light-headedness and numbness.   Hematological: Negative.    Psychiatric/Behavioral: Negative.     All other systems reviewed and are negative.          Objective       ED Triage Vitals [12/08/24 1738]   Temperature Pulse Blood Pressure Respirations SpO2 Patient Position - Orthostatic VS   (!) 97.4 °F (36.3 °C) 70 161/88 20 100 % Sitting      Temp Source Heart Rate Source BP Location FiO2 (%) Pain Score    Tympanic Monitor Right arm -- 7      Vitals      Date and Time Temp Pulse SpO2 Resp BP Pain Score FACES Pain Rating User   12/08/24 1930 -- 88 100 %  18 -- -- -- CK   12/08/24 1846 -- -- -- -- -- 5 -- CK   12/08/24 1817 -- -- -- -- -- 7 -- CK   12/08/24 1800 -- -- -- -- -- 7 -- CK   12/08/24 1738 97.4 °F (36.3 °C) 70 100 % 20 161/88 7 -- KTR            Physical Exam  Constitutional:       General: She is not in acute distress.     Appearance: She is well-developed. She is not ill-appearing, toxic-appearing or diaphoretic.   HENT:      Right Ear: External ear normal. No swelling. Tympanic membrane is not bulging.      Left Ear: External ear normal. No swelling. Tympanic membrane is not bulging.      Nose: Nose normal.      Mouth/Throat:      Pharynx: No oropharyngeal exudate.   Eyes:      General: Lids are normal.      Conjunctiva/sclera: Conjunctivae normal.      Pupils: Pupils are equal, round, and reactive to light.   Neck:      Thyroid: No thyromegaly.      Vascular: No JVD.      Trachea: No tracheal deviation.   Cardiovascular:      Rate and Rhythm: Normal rate and regular rhythm.      Pulses: Normal pulses.      Heart sounds: Normal heart sounds. No murmur heard.     No friction rub. No gallop.   Pulmonary:      Effort: Pulmonary effort is normal. No respiratory distress.      Breath sounds: Normal breath sounds. No stridor. No wheezing or rales.   Chest:      Chest wall: No tenderness.   Abdominal:      General: Bowel sounds are normal. There is no distension.      Palpations: Abdomen is soft. There is no mass.      Tenderness: There is no abdominal tenderness. There is no guarding or rebound.      Hernia: No hernia is present.   Musculoskeletal:         General: Normal range of motion.      Cervical back: Normal range of motion and neck supple. No edema. Normal range of motion.   Lymphadenopathy:      Cervical: No cervical adenopathy.   Skin:     General: Skin is warm and dry.      Coloration: Skin is not pale.      Findings: No erythema or rash.   Neurological:      General: No focal deficit present.      Mental Status: She is alert and oriented to  person, place, and time. Mental status is at baseline.      GCS: GCS eye subscore is 4. GCS verbal subscore is 5. GCS motor subscore is 6.      Cranial Nerves: No cranial nerve deficit.      Sensory: No sensory deficit.      Deep Tendon Reflexes: Reflexes are normal and symmetric.   Psychiatric:         Speech: Speech normal.         Behavior: Behavior normal.         Results Reviewed       Procedure Component Value Units Date/Time    FLU/COVID Rapid Antigen (30 min. TAT) - Preferred screening test in ED [[id number]]  (Normal) Collected: 12/08/24 1814    Lab Status: Final result Specimen: Nares from Nose Updated: 12/08/24 1852     SARS COV Rapid Antigen Negative     Influenza A Rapid Antigen Negative     Influenza B Rapid Antigen Negative    Narrative:      This test has been performed using the Toura[employer] Yenni 2 FLU+SARS Antigen test under the Emergency Use Authorization (EUA). This test has been validated by the  and verified by the performing laboratory. The Yenni uses lateral flow immunofluorescent sandwich assay to detect SARS-COV, Influenza A and Influenza B Antigen.     The Quidel Yenni 2 SARS Antigen test does not differentiate between SARS-CoV and SARS-CoV-2.     Negative results are presumptive and may be confirmed with a molecular assay, if necessary, for patient management. Negative results do not rule out SARS-CoV-2 or influenza infection and should not be used as the sole basis for treatment or patient management decisions. A negative test result may occur if the level of antigen in a sample is below the limit of detection of this test.     Positive results are indicative of the presence of viral antigens, but do not rule out bacterial infection or co-infection with other viruses.     All test results should be used as an adjunct to clinical observations and other information available to the provider.    FOR PEDIATRIC PATIENTS - copy/paste COVID Guidelines URL to browser:  [email or web address]            No orders to display       Procedures    ED Medication and Procedure Management   Prior to Admission Medications   Prescriptions Last Dose Informant Patient Reported? Taking?   Ajovy 225 MG/1.5ML auto-injector  Self Yes No   Sig: Inject 225 mg under the skin every 30 (thirty) days Last  weeks  not  sure  what day   Cequa 0.09 % SOLN  Self No No   Sig: Apply 1 drop to eye 2 (two) times a day   DULoxetine (CYMBALTA) 60 mg delayed release capsule  Self No No   Sig: Take 2 capsules (120 mg total) by mouth daily   Mirabegron ER 50 MG TB24  Self No No   Sig: Take 1 tablet (50 mg total) by mouth in the morning   Patient not taking: Reported on 2024   Multiple Vitamin (MULTIVITAMIN) capsule  Self Yes No   Sig: Take 1 capsule by mouth daily   Nurtec 75 MG TBDP  Self Yes No   Sig: Take 75 mg by mouth every other day   Semaglutide-Weight Management (WEGOVY) 2.4 MG/0.75ML  Self No No   Sig: Inject 0.75 mL (2.4 mg total) under the skin once a week   acetaZOLAMIDE (DIAMOX) 250 mg tablet  Self No No   Sig: Take 1 tablet (250 mg total) by mouth 2 (two) times a day 2 tablets two times a day   acetaZOLAMIDE (DIAMOX) 250 mg tablet  Self No No   Sig: Take 2 tablets (500 mg total) by mouth 2 (two) times a day   benzonatate (TESSALON) 200 MG capsule  Self No No   Sig: Take 1 capsule (200 mg total) by mouth 3 (three) times a day as needed for cough   cariprazine (Vraylar) 6 MG capsule  Self No No   Sig: Take 1 capsule (6 mg total) by mouth daily   celecoxib (CeleBREX) 200 mg capsule  Self No No   Sig: Take 1 capsule (200 mg total) by mouth 2 (two) times a day   cetirizine (ZyrTEC) 10 MG chewable tablet  Self Yes No   Sig: Chew 10 mg daily   dihydroergotamine (MIGRANAL) 4 MG/ML nasal spray  Self Yes No   Si spray into each nostril as needed   docusate sodium (COLACE) 100 mg capsule  Self Yes No   Sig: Take 100 mg by mouth 2 (two) times a day    gabapentin (NEURONTIN) 300 mg capsule  Self No No   Sig: Take 1 capsule (300 mg total) by mouth daily after breakfast   gabapentin (NEURONTIN) 600 MG tablet  Self No No   Sig: Take 1.5 tablets (900 mg total) by mouth daily at bedtime   levothyroxine 112 mcg tablet  Self No No   Sig: Take 1 tablet (112 mcg total) by mouth daily in the early morning   linaCLOtide (Linzess) 290 MCG CAPS  Self No No   Sig: Take 1 capsule by mouth daily   metFORMIN (GLUCOPHAGE) 500 mg tablet  Self No No   Sig: Take 1 tablet (500 mg total) by mouth daily with breakfast   naratriptan (AMERGE) 2.5 MG tablet  Self Yes No   Sig: take 1 tablet by mouth AT ONSET OF MIGRAINE may repeat in 2 hours...  (REFER TO PRESCRIPTION NOTES).   omeprazole (PriLOSEC) 20 mg delayed release capsule  Self No No   Sig: Take 1 capsule (20 mg total) by mouth 2 (two) times a day   predniSONE 20 mg tablet   No No   Sig: Take 1 tablet (20 mg total) by mouth daily for 5 days   topiramate (TOPAMAX) 100 mg tablet  Self Yes No   Sig: Take 150 mg by mouth 2 (two) times a day TAKE 1.5 TABLETS BID      Facility-Administered Medications: None     Patient's Medications   Discharge Prescriptions    No medications on file     No discharge procedures on file.  ED SEPSIS DOCUMENTATION   Time reflects when diagnosis was documented in both MDM as applicable and the Disposition within this note       Time User Action Codes Description Comment    12/8/2024  8:02 PM Stewart Wu Add [G43.909] Migraine                  Stewart Wu PA-C  12/08/24 2004

## 2024-12-09 ENCOUNTER — TELEPHONE (OUTPATIENT)
Dept: FAMILY MEDICINE CLINIC | Facility: CLINIC | Age: 39
End: 2024-12-09

## 2024-12-10 ENCOUNTER — TELEPHONE (OUTPATIENT)
Age: 39
End: 2024-12-10

## 2024-12-10 NOTE — TELEPHONE ENCOUNTER
Patient called and Sherin was able to assist .  Sherin spoke with me about Patient asking if she could take a medication her Provider pended .  The Provider spoke with david Ac from [facility] .  Osorio stated he would have Neurology call our Provider back. The Provider had given a personal cell # for Neurology to contact. A phone call was not returned as of when the Patient called into the office this morning .      Patient was advised to call Neurology herself and I will attempt to contact Neurology .  It is to confirm if steroids are to be taken for Patient's condition that was discussed on Friday's visit 12/6/2024.

## 2024-12-10 NOTE — TELEPHONE ENCOUNTER
Patient called to check if provider had received an ok from neurology to start taking the steroids. I spoke with the office, [facility] and is still waiting to get an [facility]. PCP will call back today, pt was advise to try and reach out to KRIS sandoval as well.

## 2024-12-10 NOTE — TELEPHONE ENCOUNTER
Elizabeth called as follow up to her phone call earlier today.    She states she was unable to connect with Neurology. She reports her headache is ongoing and she is not feeling well.     She is scheduled for IV infusion in two days. She states she is unsure what to do in the meantime.    Advised we will request PCP recommendation.    Advised UC/ED with new or worsening symptoms.

## 2024-12-14 DIAGNOSIS — N32.81 OVERACTIVE BLADDER: ICD-10-CM

## 2024-12-16 RX ORDER — MIRABEGRON 50 MG/1
1 TABLET, FILM COATED, EXTENDED RELEASE ORAL EVERY MORNING
Qty: 90 TABLET | Refills: 1 | Status: SHIPPED | OUTPATIENT
Start: 2024-12-16

## 2024-12-20 ENCOUNTER — TELEPHONE (OUTPATIENT)
Age: 39
End: 2024-12-20

## 2024-12-20 NOTE — TELEPHONE ENCOUNTER
Pt called to give PCP update that she was able to get OK for neurology to take steroid, she started this on Wednesday and states she does not feel better. She states that the dose she was on when she was sick helped, but it also made her extremely anxious. She also states she has a lot of other issues that are bothering her. Her incontinence at night is so bad she is soaking through a brief having to wash bedding every day. She is very frustrated and just doesn't know where to go. Offered to book appointment on Monday to see Dr Schuster, however she has 2 other appointments that day and would be unable to come to office. With all concerns she is willing to do a virtual next week sometime if able to book. Please review with PCP and call patient with any further recommendations or if able to schedule her for virtual next week.

## 2024-12-23 ENCOUNTER — TELEMEDICINE (OUTPATIENT)
Dept: FAMILY MEDICINE CLINIC | Facility: CLINIC | Age: 39
End: 2024-12-23
Payer: COMMERCIAL

## 2024-12-23 DIAGNOSIS — N32.81 OVERACTIVE BLADDER: ICD-10-CM

## 2024-12-23 DIAGNOSIS — R21 MALAR RASH: ICD-10-CM

## 2024-12-23 DIAGNOSIS — M25.541 ARTHRALGIA OF BOTH HANDS: Primary | ICD-10-CM

## 2024-12-23 DIAGNOSIS — G43.709 CHRONIC MIGRAINE WITHOUT AURA WITHOUT STATUS MIGRAINOSUS, NOT INTRACTABLE: ICD-10-CM

## 2024-12-23 DIAGNOSIS — M25.542 ARTHRALGIA OF BOTH HANDS: Primary | ICD-10-CM

## 2024-12-23 PROCEDURE — 99214 OFFICE O/P EST MOD 30 MIN: CPT | Performed by: FAMILY MEDICINE

## 2024-12-23 PROCEDURE — G2211 COMPLEX E/M VISIT ADD ON: HCPCS | Performed by: FAMILY MEDICINE

## 2024-12-23 NOTE — PROGRESS NOTES
Virtual Regular Visit  Name: Gita Rendon      : 1985      MRN: [id number]  Encounter Provider: Norma Traore MD  Encounter Date: 2024   Encounter department: [facility]      Verification of patient location:  Patient is located at Home in the following state in which I hold an active license [place] :  Assessment & Plan  Overactive bladder  - Patient would like to re-establish with Urology as symptoms of OAB have been acutely worsening, new referral was placed  Orders:    Ambulatory Referral to Urology; Future    Arthralgia of both hands  - Patient report concern for continued waxing and waning episodes of b/l arthralgias and presentation of possible malar rashes as discussed during last visit  - At this time, PCP would like patient to be evaluated with Neurology to assess for underlying autoimmune etiology despite screening testing 2023 returning back wnl  Orders:    Ambulatory Referral to Neurology; Future    Malar rash  - see a/p  Orders:    Ambulatory Referral to Neurology; Future    Chronic migraine without aura without status migrainosus, not intractable  - Patient reports that she experiences 85% improvement when taking steroid in terms of symptoms of headaches   - Now off steroid and symptoms acutely worsening           Encounter provider Norma Traore MD    The patient was identified by name and date of birth. Gita Rendon was informed that this is a telemedicine visit and that the visit is being conducted through the Epic Embedded platform. She agrees to proceed..  My office door was closed. No one else was in the room.  She acknowledged consent and understanding of privacy and security of the video platform. The patient has agreed to participate and understands they can discontinue the visit at any time.    Patient is aware this is a billable service.     History of Present Illness     Headache  Headache pattern:  Headache sometimes there,  sometimes not at all  Frequency:  Daily  Providers seen:  Neurologist  Lifetime total:  20+  Longest time without a headache:  Headache has always been present  ADL impact frequency:  Daily    Review of Systems   Neurological:  Positive for headaches.       Objective   There were no vitals taken for this visit.    Physical Exam  Constitutional:       Appearance: Normal appearance.   Neurological:      Mental Status: She is alert and oriented to person, place, and time.   Psychiatric:         Mood and Affect: Mood normal.         Thought Content: Thought content normal.         Visit Time  Total Visit Duration: 20 min

## 2024-12-23 NOTE — ASSESSMENT & PLAN NOTE
- Patient reports that she experiences 85% improvement when taking steroid in terms of symptoms of headaches   - Now off steroid and symptoms acutely worsening

## 2024-12-23 NOTE — ASSESSMENT & PLAN NOTE
- Patient would like to re-establish with Urology as symptoms of OAB have been acutely worsening, new referral was placed  Orders:    Ambulatory Referral to Urology; Future

## 2024-12-30 DIAGNOSIS — K59.09 CHRONIC CONSTIPATION: ICD-10-CM

## 2024-12-31 DIAGNOSIS — K59.09 CHRONIC CONSTIPATION: ICD-10-CM

## 2024-12-31 RX ORDER — LINACLOTIDE 290 UG/1
290 CAPSULE, GELATIN COATED ORAL DAILY
Qty: 90 CAPSULE | Refills: 1 | Status: SHIPPED | OUTPATIENT
Start: 2024-12-31 | End: 2025-06-29

## 2025-01-02 ENCOUNTER — OFFICE VISIT (OUTPATIENT)
Dept: BARIATRICS | Facility: CLINIC | Age: 40
End: 2025-01-02
Payer: COMMERCIAL

## 2025-01-02 VITALS
RESPIRATION RATE: 20 BRPM | OXYGEN SATURATION: 99 % | HEART RATE: 75 BPM | SYSTOLIC BLOOD PRESSURE: 128 MMHG | BODY MASS INDEX: 45.99 KG/M2 | WEIGHT: 293 LBS | TEMPERATURE: 98.7 F | DIASTOLIC BLOOD PRESSURE: 78 MMHG | HEIGHT: 67 IN

## 2025-01-02 DIAGNOSIS — R73.03 PREDIABETES: ICD-10-CM

## 2025-01-02 DIAGNOSIS — E66.01 MORBID OBESITY (HCC): Primary | ICD-10-CM

## 2025-01-02 PROCEDURE — 99214 OFFICE O/P EST MOD 30 MIN: CPT | Performed by: PHYSICIAN ASSISTANT

## 2025-01-02 RX ORDER — LINACLOTIDE 290 UG/1
CAPSULE, GELATIN COATED ORAL DAILY
Qty: 90 CAPSULE | Refills: 1 | Status: SHIPPED | OUTPATIENT
Start: 2025-01-02

## 2025-01-02 RX ORDER — TIRZEPATIDE 10 MG/.5ML
10 INJECTION, SOLUTION SUBCUTANEOUS WEEKLY
Qty: 2 ML | Refills: 1 | Status: SHIPPED | OUTPATIENT
Start: 2025-01-02

## 2025-01-02 NOTE — PROGRESS NOTES
Assessment/Plan:    Morbid obesity (HCC)  -Patient is pursuing Conservative Program  -Initial weight loss goal of 5-10% weight loss for improved health- met  -Screening labs: reviewed, up to date  -dietary/lifestyle changes, continue to work with BH  -On Topamax for headaches  -No longer on Wellbutrin for mood  -avoid Phentermine  -Was on Ozempic and then switched to Wegovy and on 1.7mg dose. Dose increased to 2.4mg last month since patient reported weight gain and increased appetite. Patient is not noticing any weight loss since increasing to max dose. We discussed switching her to Zepbound. Patient would like to try this. SE profile reviewed. Will start 10mg dose.  -She will work on increasing water to goal, reducing artificially sweetened drinks and increasing fruit and veggies. Reviewed protein goals  -medication agreement signed    Initial: 421 lbs  Last OV: 320  Current: 339.2 lbs  Change: -81.8 lbs, +19.2 lbs since last visit  Goal: wants to feel healthy    Prediabetes  -On Metformin 500mg daily  -HgbA1c normalized    Goals:    Food log (ie.) [email or web address],etc.   No sugary beverages. At least 64oz of water daily.  Increase physical activity by 10 minutes daily. Gradually increase physical activity to a goal of 5 days per week for 30 minutes of MODERATE intensity PLUS 2 days per week of FULL BODY resistance training  5-10 servings of fruits and vegetables per day  25-35 grams of dietary fiber per day  90 grams of protein per day  Hard boiled eggs, Fairlife milk, Tuna, cheese stick, nuts, lean meat such as chicken, turkey     Follow up in approximately  4 months  with Non-Surgical Physician/Advanced Practitioner.     Diagnoses and all orders for this visit:    Morbid obesity (HCC)  -     tirzepatide (Zepbound) 10 mg/0.5 mL auto-injector; Inject 0.5 mL (10 mg total) under the skin once a week    BMI 50.0-59.9, adult (HCC)  -     tirzepatide (Zepbound) 10 mg/0.5  mL auto-injector; Inject 0.5 mL (10 mg total) under the skin once a week    Prediabetes    Other orders  -     cariprazine (Vraylar) 6 MG capsule; Take 6 mg by mouth daily          Subjective:   Chief Complaint   Patient presents with    Follow-up        Patient ID: Gita Rendon  is a 39 y.o. female with excess weight/obesity here to pursue weight managment.  Patient is pursuing Conservative Program.     HPI Patient presents for [facility] follow up. Currently on Wegovy 2.4mg and tolerating well. Denies adverse SE. Reports she has not seen a reduction in her weight since increasing the Wegovy dose from 1.7 to 2.4mg. Feeling much better overall from a mental health standpoint. Increase in appetite since her mood has improved    Hydration: water minimal, 32oz diet decaf iced tea, 1 cup coffee + flavored creamer  Sleep: sleeps a lot when having a headache; 7-8 hours  Exercise: denies, reports fatigues easily    B: 2 MG slices toast + PB  S: skips  L: quesadilla from gas station, left over pizza  S: occasional wheat thins + cheese  D: cereal OR sandwich or breaded chicken OR tilapia + green beans  S: sweet cravings - brownies      Wt Readings from Last 10 Encounters:   01/02/25 (!) 154 kg (339 lb 3.2 oz)   12/08/24 (!) 152 kg (335 lb 1.6 oz)   12/06/24 (!) 152 kg (335 lb 3.2 oz)   11/18/24 (!) 147 kg (325 lb)   11/12/24 (!) 147 kg (325 lb)   11/07/24 (!) 150 kg (330 lb 9.6 oz)   10/11/24 (!) 147 kg (323 lb 3.2 oz)   10/09/24 (!) 143 kg (316 lb)   09/27/24 (!) 145 kg (319 lb 6.4 oz)   08/25/24 (!) 147 kg (323 lb 6.6 oz)        The following portions of the patient's history were reviewed and updated as appropriate: allergies, current medications, past family history, past medical history, past social history, past surgical history, and problem list.    Review of Systems   Gastrointestinal:  Negative for abdominal pain, constipation, nausea and vomiting.   Psychiatric/Behavioral:  Positive for dysphoric mood (improved).   "      Objective:    /78 (BP Location: Right arm, Patient Position: Sitting, Cuff Size: Large)   Pulse 75   Temp 98.7 °F (37.1 °C) (Temporal)   Resp 20   Ht 5' 7\" (1.702 m)   Wt (!) 154 kg (339 lb 3.2 oz)   SpO2 99%   BMI 53.13 kg/m²      Physical Exam  Vitals and nursing note reviewed.   Constitutional:       General: She is not in acute distress.     Appearance: She is obese. She is not toxic-appearing.   HENT:      Head: Normocephalic and atraumatic.      Nose: Nose normal.      Mouth/Throat:      Mouth: Mucous membranes are moist.   Eyes:      General: No scleral icterus.  Pulmonary:      Effort: Pulmonary effort is normal. No respiratory distress.   Abdominal:      Comments: protuberant   Skin:     General: Skin is dry.      Coloration: Skin is not jaundiced.   Neurological:      General: No focal deficit present.      Mental Status: She is alert and oriented to person, place, and time. Mental status is at baseline.   Psychiatric:         Mood and Affect: Mood normal.         Behavior: Behavior normal.         Thought Content: Thought content normal.         Judgment: Judgment normal.        "

## 2025-01-02 NOTE — ASSESSMENT & PLAN NOTE
-Patient is pursuing Conservative Program  -Initial weight loss goal of 5-10% weight loss for improved health- met  -Screening labs: reviewed, up to date  -dietary/lifestyle changes, continue to work with BH  -On Topamax for headaches  -No longer on Wellbutrin for mood  -avoid Phentermine  -Was on Ozempic and then switched to Wegovy and on 1.7mg dose. Dose increased to 2.4mg last month since patient reported weight gain and increased appetite. Patient is not noticing any weight loss since increasing to max dose. We discussed switching her to Zepbound. Patient would like to try this. SE profile reviewed. Will start 10mg dose.  -She will work on increasing water to goal, reducing artificially sweetened drinks and increasing fruit and veggies. Reviewed protein goals  -medication agreement signed    Initial: 421 lbs  Last OV: 320  Current: 339.2 lbs  Change: -81.8 lbs, +19.2 lbs since last visit  Goal: wants to feel healthy

## 2025-01-02 NOTE — PATIENT INSTRUCTIONS
Goals:    Food log (ie.) [email or web address],etc.   No sugary beverages. At least 64oz of water daily.  Increase physical activity by 10 minutes daily. Gradually increase physical activity to a goal of 5 days per week for 30 minutes of MODERATE intensity PLUS 2 days per week of FULL BODY resistance training  5-10 servings of fruits and vegetables per day  25-35 grams of dietary fiber per day  90 grams of protein per day  Hard boiled eggs, Fairlife milk, Tuna, cheese stick, nuts, lean meat such as chicken, turkey

## 2025-01-03 ENCOUNTER — TELEPHONE (OUTPATIENT)
Dept: BARIATRICS | Facility: CLINIC | Age: 40
End: 2025-01-03

## 2025-01-03 NOTE — TELEPHONE ENCOUNTER
Representative from [facility] noted that the Zepbound is excluded from coverage and she will be faxing over the decision letter.

## 2025-01-04 DIAGNOSIS — K21.9 GASTROESOPHAGEAL REFLUX DISEASE WITHOUT ESOPHAGITIS: ICD-10-CM

## 2025-01-09 ENCOUNTER — TELEPHONE (OUTPATIENT)
Age: 40
End: 2025-01-09

## 2025-01-09 NOTE — TELEPHONE ENCOUNTER
Please refer to denial where it states asking for a second opinion and call the number listed - Patient was prescribed Zepbound with the Diagnosis of obesity. Why does patient need to try and fail medications that are FDA approved for diabetes in order for Zepbound to be approved

## 2025-01-09 NOTE — TELEPHONE ENCOUNTER
Patient of Dr Lozano. Calling back to check on the denial of the Zepbound and what is the next step. Patient states she still has plenty of the Wegovy injections available. Please advise.

## 2025-01-09 NOTE — TELEPHONE ENCOUNTER
Spoke to [facility] - they reopened the case with updated info. They will fax a new determination within 24 hours.     New case # [id number]

## 2025-01-10 ENCOUNTER — TELEPHONE (OUTPATIENT)
Dept: BARIATRICS | Facility: CLINIC | Age: 40
End: 2025-01-10

## 2025-01-10 DIAGNOSIS — E66.01 MORBID OBESITY (HCC): ICD-10-CM

## 2025-01-10 NOTE — TELEPHONE ENCOUNTER
"Patient went to pick her prescription for the Zepbound at [facility] and was told by the pharmacist that they cannot fill it as \"it is not formulary\"? Patient would like to try [facility] in [place].  "

## 2025-01-13 NOTE — TELEPHONE ENCOUNTER
Called and spoke with pharmacy, said they do have the script but it is on backorder and they are unsure of when they will be able to get it in as they switched suppliers.   Please resend script to [facility] in [place] for patient.

## 2025-01-14 DIAGNOSIS — E66.01 MORBID OBESITY (HCC): ICD-10-CM

## 2025-01-14 RX ORDER — TIRZEPATIDE 10 MG/.5ML
10 INJECTION, SOLUTION SUBCUTANEOUS WEEKLY
Qty: 2 ML | Refills: 1 | Status: SHIPPED | OUTPATIENT
Start: 2025-01-14

## 2025-01-15 ENCOUNTER — TELEPHONE (OUTPATIENT)
Age: 40
End: 2025-01-15

## 2025-01-15 DIAGNOSIS — I10 PRIMARY HYPERTENSION: Primary | Chronic | ICD-10-CM

## 2025-01-15 NOTE — TELEPHONE ENCOUNTER
Pt called and wanted office to know that she called [facility] and they are going to be faxing over a new script for supplies for her overactive bladder to be filled out.

## 2025-01-15 NOTE — TELEPHONE ENCOUNTER
Patient calling asks if any labs are needed before her 1/27 appointment.  She said she had labs in Oct when she was hospitalized but wasn't sure if provider wanted more up to date labs.  Please advisee and call patient.

## 2025-01-16 ENCOUNTER — APPOINTMENT (OUTPATIENT)
Dept: LAB | Facility: CLINIC | Age: 40
End: 2025-01-16
Payer: COMMERCIAL

## 2025-01-16 DIAGNOSIS — I10 PRIMARY HYPERTENSION: Chronic | ICD-10-CM

## 2025-01-16 LAB
BACTERIA UR QL AUTO: ABNORMAL /HPF
BILIRUB UR QL STRIP: NEGATIVE
CLARITY UR: CLEAR
COLOR UR: ABNORMAL
GLUCOSE UR STRIP-MCNC: NEGATIVE MG/DL
HGB UR QL STRIP.AUTO: NEGATIVE
KETONES UR STRIP-MCNC: NEGATIVE MG/DL
LEUKOCYTE ESTERASE UR QL STRIP: NEGATIVE
MUCOUS THREADS UR QL AUTO: ABNORMAL
NITRITE UR QL STRIP: NEGATIVE
NON-SQ EPI CELLS URNS QL MICRO: ABNORMAL /HPF
PH UR STRIP.AUTO: 6 [PH]
PROT UR STRIP-MCNC: ABNORMAL MG/DL
RBC #/AREA URNS AUTO: ABNORMAL /HPF
SP GR UR STRIP.AUTO: 1.02 (ref 1–1.03)
UROBILINOGEN UR STRIP-ACNC: <2 MG/DL
WBC #/AREA URNS AUTO: ABNORMAL /HPF

## 2025-01-16 PROCEDURE — 81001 URINALYSIS AUTO W/SCOPE: CPT

## 2025-01-16 PROCEDURE — 36415 COLL VENOUS BLD VENIPUNCTURE: CPT

## 2025-01-16 PROCEDURE — 80048 BASIC METABOLIC PNL TOTAL CA: CPT

## 2025-01-17 ENCOUNTER — RESULTS FOLLOW-UP (OUTPATIENT)
Dept: NEPHROLOGY | Facility: CLINIC | Age: 40
End: 2025-01-17

## 2025-01-17 LAB
ANION GAP SERPL CALCULATED.3IONS-SCNC: 8 MMOL/L (ref 4–13)
BUN SERPL-MCNC: 26 MG/DL (ref 5–25)
CALCIUM SERPL-MCNC: 8.3 MG/DL (ref 8.4–10.2)
CHLORIDE SERPL-SCNC: 110 MMOL/L (ref 96–108)
CO2 SERPL-SCNC: 21 MMOL/L (ref 21–32)
CREAT SERPL-MCNC: 0.7 MG/DL (ref 0.6–1.3)
GFR SERPL CREATININE-BSD FRML MDRD: 108 ML/MIN/1.73SQ M
GLUCOSE SERPL-MCNC: 95 MG/DL (ref 65–140)
POTASSIUM SERPL-SCNC: 3.9 MMOL/L (ref 3.5–5.3)
SODIUM SERPL-SCNC: 139 MMOL/L (ref 135–147)

## 2025-01-26 ENCOUNTER — NURSE TRIAGE (OUTPATIENT)
Dept: OTHER | Facility: OTHER | Age: 40
End: 2025-01-26

## 2025-01-26 DIAGNOSIS — R73.01 IFG (IMPAIRED FASTING GLUCOSE): ICD-10-CM

## 2025-01-26 DIAGNOSIS — F50.812 BINGE-EATING DISORDER, SEVERE: ICD-10-CM

## 2025-01-26 DIAGNOSIS — E66.01 MORBID (SEVERE) OBESITY DUE TO EXCESS CALORIES (HCC): ICD-10-CM

## 2025-01-26 DIAGNOSIS — R63.5 ABNORMAL WEIGHT GAIN: ICD-10-CM

## 2025-01-26 NOTE — TELEPHONE ENCOUNTER
"Regarding: Headache  ----- Message from Brynn MILLER sent at 1/26/2025  3:13 PM EST -----  Pt stated, \" I have a really bad headache for a few days now. I suffer from headaches but I am not feeling well today.\"    "

## 2025-01-26 NOTE — TELEPHONE ENCOUNTER
"Patient calling with severe headache since Thursday causing lethargy. Patient states migraines are chronic symptom but she has not had relief.  Discussed disposition and patient stated she will go to the emergency room for evaluation.       Reason for Disposition   [1] SEVERE headache (e.g., excruciating) AND [2] not improved after 2 hours of pain medicine    Answer Assessment - Initial Assessment Questions  1. LOCATION: \"Where does it hurt?\"           2. ONSET: \"When did the headache start?\" (e.g., minutes, hours, days)         Thursday into Friday with intensity    3. PATTERN: \"Does the pain come and go, or has it been constant since it started?\"        Constant    4.SEVERITY: \"How bad is the pain?\" and \"What does it keep you from doing?\"  (e.g., Scale 1-10; mild, moderate, or severe)        Severe discomfort and it gives patient extreme fatigue     5. RECURRENT SYMPTOM: \"Have you ever had headaches before?\" If Yes, ask: \"When was the last time?\" and \"What happened that time?\"         Yes    6. CAUSE: \"What do you think is causing the headache?\"        Patient is unsure    7. MIGRAINE: \"Have you been diagnosed with migraine headaches?\" If Yes, ask: \"Is this headache similar?\"         Yes    8. HEAD INJURY: \"Has there been any recent injury to the head?\"         Denies, but states on 1/15 received occipital nerve block.    Protocols used: Headache-Adult-AH    "

## 2025-01-27 ENCOUNTER — OFFICE VISIT (OUTPATIENT)
Dept: NEPHROLOGY | Facility: CLINIC | Age: 40
End: 2025-01-27
Payer: COMMERCIAL

## 2025-01-27 VITALS
RESPIRATION RATE: 16 BRPM | WEIGHT: 293 LBS | HEART RATE: 70 BPM | DIASTOLIC BLOOD PRESSURE: 80 MMHG | BODY MASS INDEX: 45.99 KG/M2 | OXYGEN SATURATION: 99 % | HEIGHT: 67 IN | SYSTOLIC BLOOD PRESSURE: 128 MMHG | TEMPERATURE: 98.2 F

## 2025-01-27 DIAGNOSIS — E26.9 HYPERALDOSTERONISM (HCC): Primary | ICD-10-CM

## 2025-01-27 DIAGNOSIS — G43.909 MIGRAINE HEADACHE: ICD-10-CM

## 2025-01-27 DIAGNOSIS — E66.01 MORBID OBESITY (HCC): ICD-10-CM

## 2025-01-27 DIAGNOSIS — G93.2 INTRACRANIAL HYPERTENSION: ICD-10-CM

## 2025-01-27 PROCEDURE — 99214 OFFICE O/P EST MOD 30 MIN: CPT | Performed by: INTERNAL MEDICINE

## 2025-01-27 RX ORDER — CARIPRAZINE 3 MG/1
CAPSULE, GELATIN COATED ORAL
COMMUNITY
Start: 2024-12-26

## 2025-01-27 NOTE — TELEPHONE ENCOUNTER
Called patient to check in to see how she is feeling due to not going to ER. She was advised to not drive and was unsure if she could get an uber home due to past experiences. Patient is feeling a lot better and will reach out if she has any concerns.

## 2025-01-27 NOTE — TELEPHONE ENCOUNTER
Hi Ladies,     On review of this encounter, it states that patient was advised to present to ED for eval and management. Upon refresh of care everywhere, I am not seeing an encounter for this from the weekend. Please complete outreach to see how the patient is doing.     Thanks !   Luis

## 2025-01-27 NOTE — PROGRESS NOTES
Assessment & Plan:    1. Hyperaldosteronism (HCC)  -     Urinalysis with microscopic; Future; Expected date: 05/06/2025  -     Albumin / creatinine urine ratio; Future; Expected date: 05/06/2025  -     PTH, intact; Future; Expected date: 05/06/2025  -     Comprehensive metabolic panel; Future; Expected date: 05/06/2025  -     CBC and differential; Future; Expected date: 05/06/2025  -     Uric acid; Future; Expected date: 05/06/2025  -     Aldosterone/Renin Ratio; Future; Expected date: 05/06/2025  2. Migraine headache  3. Intracranial hypertension  4. Morbid obesity (HCC)       Hyperaldosteronism  Patient reports this was a presumptive diagnosis and not actually confirmed. She was under care of previous nephrologist and managed on spirinolactone.  Several months ago taken off spirinolactone and BP trends have not warranted therapy.  Will check camila/renin ratio prior to next visit.  Check metabolic parameters and UACR with next visit as well.       2. Migraine headaches  Reports following with another neurologist for migraine management.       3. Intracranial HTN   Patient under management of [facility]. Query patterns of increased diuresis.   Nephrology following for metabolic management of diamox therapy.Attempted titration of Diamox several months ago but did not tolerate and developed worsening NAMGA. She ended up in ER and returned to previous dosing of 500mg BID.  Would defer titration and management of ICH to neurology colleagues.  Current metabolic trends stable, TCO2 21 and no need for bicarbonate at present.   I encouraged patient to communicate with [facility] who manages her condition.  I reached out to primary to express my concerns for patient as well.  Her sodium trends are normal. Urine volume not quantified for polyuria, she appears to be maintaining her serum sodium with current intake.      4. Morbid obesity  Planning on transitioning from Wegovy to Zepbound. Patient does have hx CAIO.  Friday 7/1/2022 9:00A Cardiac Cath with possible.  Continue adjustment per bariatrics service.      The benefits, risks and alternatives to the treatment plan were discussed at this visit. Patient was advised of common adverse effects of any medical therapies prescribed. All questions were answered and discussed with the patient and any accompanying family members or caretakers.      Subjective:      Patient ID: Gita Rendon is a 39 y.o. female presents for follow up in the [place] office for HTN management.     HPI  In the interim since last visit,she was hospitalized for mental health reasons. All her medications were stopped including spironolactone. Previous documentaiton suggested lower BP trends. Off spirinolactone for several months now.   She was on Wegovy at 1.7 and increased dose to 2.4, but did not achieve weight loss and not felt to be effective. Plans on transitioning to Zepbound.     Patient seen by another neurologist for migraines. She is noting feeling unwell for 1 week at a time with migraines and poor oral intake followed by spontaneous increase in diuresis.   Maintained on stable dose of diamox 500 mg BID, did not tolerate higher doses at 750 mg and caused mailase and  worsening metabolic acidosis. Follows with [place] neurology for intracranial HTN. Nephrology is following metabolic parameters and previous proposed diagnosis of hyperaldosteronism.     The following portions of the patient's history were reviewed and updated as appropriate: allergies, current medications, past family history, past medical history, past social history, past surgical history, and problem list.    Review of Systems   Constitutional: Negative.    Respiratory: Negative.     Cardiovascular: Negative.    Gastrointestinal: Negative.    Genitourinary: Negative.    Neurological: Negative.    All other systems reviewed and are negative.        Objective:      /80 (Patient Position: Sitting, Cuff Size: Standard)   Pulse 70   Temp 98.2 °F (36.8 °C) (Temporal)   " Resp 16   Ht 5' 7\" (1.702 m)   Wt (!) 154 kg (338 lb 12.8 oz)   SpO2 99%   BMI 53.06 kg/m²          Physical Exam  Vitals reviewed.   Constitutional:       General: She is not in acute distress.     Appearance: She is not ill-appearing.   HENT:      Head: Normocephalic and atraumatic.      Nose: Nose normal.      Mouth/Throat:      Mouth: Mucous membranes are moist.      Pharynx: Oropharynx is clear.   Cardiovascular:      Rate and Rhythm: Normal rate and regular rhythm.      Heart sounds:      No friction rub.   Pulmonary:      Breath sounds: No wheezing, rhonchi or rales.   Abdominal:      Palpations: Abdomen is soft.      Tenderness: There is no abdominal tenderness. There is no guarding.   Musculoskeletal:      Right lower leg: No edema.      Left lower leg: No edema.   Skin:     Coloration: Skin is not jaundiced.      Findings: No bruising.   Neurological:      General: No focal deficit present.      Mental Status: She is alert and oriented to person, place, and time. Mental status is at baseline.      Cranial Nerves: No cranial nerve deficit.             Lab Results   Component Value Date     10/20/2015    SODIUM 141 01/30/2025    K 3.6 01/30/2025     (H) 01/30/2025    CO2 21 01/30/2025    ANIONGAP 10 10/20/2015    AGAP 8 01/30/2025    BUN 32 (H) 01/30/2025    CREATININE 0.70 01/30/2025    GLUC 83 01/30/2025    GLUF 96 10/15/2024    CALCIUM 8.8 01/30/2025    AST 10 (L) 01/30/2025    ALT 12 01/30/2025    ALKPHOS 72 01/30/2025    PROT 7.5 10/20/2015    TP 6.0 (L) 01/30/2025    BILITOT 0.41 10/20/2015    TBILI 0.23 01/30/2025    EGFR 108 01/30/2025      Lab Results   Component Value Date    CREATININE 0.70 01/30/2025    CREATININE 0.70 01/16/2025    CREATININE 0.75 10/23/2024    CREATININE 0.68 10/15/2024    CREATININE 0.73 10/09/2024    CREATININE 0.84 09/21/2024    CREATININE 0.86 09/16/2024    CREATININE 0.68 07/05/2024    CREATININE 0.80 07/03/2024    CREATININE 0.78 06/21/2024    CREATININE " "0.77 06/15/2024    CREATININE 0.91 05/25/2024    CREATININE 0.77 05/22/2024    CREATININE 0.71 04/16/2024    CREATININE 0.72 04/06/2024      Lab Results   Component Value Date    COLORU Light Yellow 01/16/2025    CLARITYU Clear 01/16/2025    SPECGRAV 1.023 01/16/2025    PHUR 6.0 01/16/2025    LEUKOCYTESUR Negative 01/16/2025    NITRITE Negative 01/16/2025    PROTEIN UA Trace (A) 01/16/2025    GLUCOSEU Negative 01/16/2025    KETONESU Negative 01/16/2025    UROBILINOGEN <2.0 01/16/2025    BILIRUBINUR Negative 01/16/2025    BLOODU Negative 01/16/2025    RBCUA 1-2 01/16/2025    WBCUA 1-2 01/16/2025    EPIS Moderate (A) 01/16/2025    BACTERIA None Seen 01/16/2025      No results found for: \"LABPROT\"  No results found for: \"MICROALBUR\", \"NJUS36TRP\"  Lab Results   Component Value Date    WBC 12.09 (H) 01/30/2025    HGB 13.5 01/30/2025    HCT 42.5 01/30/2025    MCV 90 01/30/2025     01/30/2025      Lab Results   Component Value Date    HGB 13.5 01/30/2025    HGB 13.5 10/23/2024    HGB 14.6 10/09/2024    HGB 15.3 09/21/2024    HGB 14.7 09/16/2024      Lab Results   Component Value Date    IRON 48 (L) 02/24/2015      No results found for: \"PTHCALCIUM\", \"[id number]\", \"PHOSPHORUS\"   Lab Results   Component Value Date    CHOLESTEROL 149 10/25/2024    HDL 49 (L) 10/25/2024    LDLCALC 84 10/25/2024    TRIG 78 10/25/2024      Lab Results   Component Value Date    URICACID 8.2 (H) 11/13/2021      Lab Results   Component Value Date    HGBA1C 5.4 10/25/2024      Lab Results   Component Value Date    V6OHGQM 0.90 01/09/2018    FREET4 0.73 07/03/2024      Lab Results   Component Value Date    JANE Negative 09/23/2023      Lab Results   Component Value Date    PROT 7.5 10/20/2015        Portions of the record may have been created with voice recognition software. Occasional wrong word or \"sound a like\" substitutions may have occurred due to the inherent limitations of voice recognition software. Read the chart carefully and " recognize, using context, where substitutions have occurred. If you have any questions, please contact the dictating provider.

## 2025-01-30 ENCOUNTER — HOSPITAL ENCOUNTER (EMERGENCY)
Facility: HOSPITAL | Age: 40
Discharge: HOME | End: 2025-01-30
Attending: EMERGENCY MEDICINE
Payer: COMMERCIAL

## 2025-01-30 ENCOUNTER — TELEPHONE (OUTPATIENT)
Dept: FAMILY MEDICINE CLINIC | Facility: CLINIC | Age: 40
End: 2025-01-30

## 2025-01-30 ENCOUNTER — APPOINTMENT (EMERGENCY)
Dept: CT IMAGING | Facility: HOSPITAL | Age: 40
End: 2025-01-30
Payer: COMMERCIAL

## 2025-01-30 VITALS
SYSTOLIC BLOOD PRESSURE: 113 MMHG | HEART RATE: 59 BPM | BODY MASS INDEX: 21.55 KG/M2 | RESPIRATION RATE: 18 BRPM | TEMPERATURE: 98.4 F | OXYGEN SATURATION: 96 % | WEIGHT: 137.57 LBS | DIASTOLIC BLOOD PRESSURE: 59 MMHG

## 2025-01-30 DIAGNOSIS — R51.9 HEADACHE: Primary | ICD-10-CM

## 2025-01-30 LAB
ALBUMIN SERPL BCG-MCNC: 3.8 G/DL (ref 3.5–5)
ALP SERPL-CCNC: 72 U/L (ref 34–104)
ALT SERPL W P-5'-P-CCNC: 12 U/L (ref 7–52)
ANION GAP SERPL CALCULATED.3IONS-SCNC: 8 MMOL/L (ref 4–13)
AST SERPL W P-5'-P-CCNC: 10 U/L (ref 13–39)
BASOPHILS # BLD AUTO: 0.07 THOUSANDS/ΜL (ref 0–0.1)
BASOPHILS NFR BLD AUTO: 1 % (ref 0–1)
BILIRUB SERPL-MCNC: 0.23 MG/DL (ref 0.2–1)
BUN SERPL-MCNC: 32 MG/DL (ref 5–25)
CALCIUM SERPL-MCNC: 8.8 MG/DL (ref 8.4–10.2)
CHLORIDE SERPL-SCNC: 112 MMOL/L (ref 96–108)
CO2 SERPL-SCNC: 21 MMOL/L (ref 21–32)
CREAT SERPL-MCNC: 0.7 MG/DL (ref 0.6–1.3)
EOSINOPHIL # BLD AUTO: 0.29 THOUSAND/ΜL (ref 0–0.61)
EOSINOPHIL NFR BLD AUTO: 2 % (ref 0–6)
ERYTHROCYTE [DISTWIDTH] IN BLOOD BY AUTOMATED COUNT: 13.2 % (ref 11.6–15.1)
EXT PREGNANCY TEST URINE: NEGATIVE
EXT. CONTROL: NORMAL
GFR SERPL CREATININE-BSD FRML MDRD: 108 ML/MIN/1.73SQ M
GLUCOSE SERPL-MCNC: 83 MG/DL (ref 65–140)
HCT VFR BLD AUTO: 42.5 % (ref 34.8–46.1)
HGB BLD-MCNC: 13.5 G/DL (ref 11.5–15.4)
IMM GRANULOCYTES # BLD AUTO: 0.04 THOUSAND/UL (ref 0–0.2)
IMM GRANULOCYTES NFR BLD AUTO: 0 % (ref 0–2)
LYMPHOCYTES # BLD AUTO: 2.69 THOUSANDS/ΜL (ref 0.6–4.47)
LYMPHOCYTES NFR BLD AUTO: 22 % (ref 14–44)
MAGNESIUM SERPL-MCNC: 1.8 MG/DL (ref 1.9–2.7)
MCH RBC QN AUTO: 28.6 PG (ref 26.8–34.3)
MCHC RBC AUTO-ENTMCNC: 31.8 G/DL (ref 31.4–37.4)
MCV RBC AUTO: 90 FL (ref 82–98)
MONOCYTES # BLD AUTO: 0.87 THOUSAND/ΜL (ref 0.17–1.22)
MONOCYTES NFR BLD AUTO: 7 % (ref 4–12)
NEUTROPHILS # BLD AUTO: 8.13 THOUSANDS/ΜL (ref 1.85–7.62)
NEUTS SEG NFR BLD AUTO: 68 % (ref 43–75)
NRBC BLD AUTO-RTO: 0 /100 WBCS
PLATELET # BLD AUTO: 290 THOUSANDS/UL (ref 149–390)
PMV BLD AUTO: 9.8 FL (ref 8.9–12.7)
POTASSIUM SERPL-SCNC: 3.6 MMOL/L (ref 3.5–5.3)
PROT SERPL-MCNC: 6 G/DL (ref 6.4–8.4)
RBC # BLD AUTO: 4.72 MILLION/UL (ref 3.81–5.12)
SODIUM SERPL-SCNC: 141 MMOL/L (ref 135–147)
WBC # BLD AUTO: 12.09 THOUSAND/UL (ref 4.31–10.16)

## 2025-01-30 PROCEDURE — 96367 TX/PROPH/DG ADDL SEQ IV INF: CPT

## 2025-01-30 PROCEDURE — 83735 ASSAY OF MAGNESIUM: CPT | Performed by: EMERGENCY MEDICINE

## 2025-01-30 PROCEDURE — 85025 COMPLETE CBC W/AUTO DIFF WBC: CPT

## 2025-01-30 PROCEDURE — 81025 URINE PREGNANCY TEST: CPT | Performed by: EMERGENCY MEDICINE

## 2025-01-30 PROCEDURE — 99284 EMERGENCY DEPT VISIT MOD MDM: CPT | Performed by: EMERGENCY MEDICINE

## 2025-01-30 PROCEDURE — 36415 COLL VENOUS BLD VENIPUNCTURE: CPT

## 2025-01-30 PROCEDURE — 99284 EMERGENCY DEPT VISIT MOD MDM: CPT

## 2025-01-30 PROCEDURE — 96375 TX/PRO/DX INJ NEW DRUG ADDON: CPT

## 2025-01-30 PROCEDURE — 96365 THER/PROPH/DIAG IV INF INIT: CPT

## 2025-01-30 PROCEDURE — 80053 COMPREHEN METABOLIC PANEL: CPT

## 2025-01-30 PROCEDURE — 70491 CT SOFT TISSUE NECK W/DYE: CPT

## 2025-01-30 RX ORDER — MAGNESIUM SULFATE HEPTAHYDRATE 40 MG/ML
2 INJECTION, SOLUTION INTRAVENOUS ONCE
Status: COMPLETED | OUTPATIENT
Start: 2025-01-30 | End: 2025-01-30

## 2025-01-30 RX ORDER — KETOROLAC TROMETHAMINE 30 MG/ML
30 INJECTION, SOLUTION INTRAMUSCULAR; INTRAVENOUS ONCE
Status: COMPLETED | OUTPATIENT
Start: 2025-01-30 | End: 2025-01-30

## 2025-01-30 RX ORDER — METOCLOPRAMIDE HYDROCHLORIDE 5 MG/ML
10 INJECTION INTRAMUSCULAR; INTRAVENOUS ONCE
Status: COMPLETED | OUTPATIENT
Start: 2025-01-30 | End: 2025-01-30

## 2025-01-30 RX ORDER — DIPHENHYDRAMINE HYDROCHLORIDE 50 MG/ML
25 INJECTION INTRAMUSCULAR; INTRAVENOUS ONCE
Status: COMPLETED | OUTPATIENT
Start: 2025-01-30 | End: 2025-01-30

## 2025-01-30 RX ADMIN — MAGNESIUM SULFATE HEPTAHYDRATE 2 G: 40 INJECTION, SOLUTION INTRAVENOUS at 15:54

## 2025-01-30 RX ADMIN — IOHEXOL 85 ML: 350 INJECTION, SOLUTION INTRAVENOUS at 16:33

## 2025-01-30 RX ADMIN — DIPHENHYDRAMINE HYDROCHLORIDE 25 MG: 50 INJECTION, SOLUTION INTRAMUSCULAR; INTRAVENOUS at 15:52

## 2025-01-30 RX ADMIN — METOCLOPRAMIDE 10 MG: 5 INJECTION, SOLUTION INTRAMUSCULAR; INTRAVENOUS at 15:52

## 2025-01-30 RX ADMIN — SODIUM CHLORIDE 1000 ML: 0.9 INJECTION, SOLUTION INTRAVENOUS at 15:50

## 2025-01-30 RX ADMIN — KETOROLAC TROMETHAMINE 30 MG: 30 INJECTION, SOLUTION INTRAMUSCULAR at 15:51

## 2025-01-30 RX ADMIN — VALPROATE SODIUM 1000 MG: 100 INJECTION, SOLUTION INTRAVENOUS at 17:48

## 2025-01-30 NOTE — TELEPHONE ENCOUNTER
Provider was reviewing Patient's chart for visit and requested that Patient be seen at Neuro on a same day appointment or go to the Emergency Department where they can do more testing .    Patient said that Dr. Schuster has ordered steroids for Patient and they help.  It was then suggested that Patient try to get into Neuro or ED.  I asked Patient to inform us and keep the office updated.

## 2025-01-30 NOTE — TELEPHONE ENCOUNTER
Patient called back today with same concerns from Sunday. She states these headaches are ongoing/chronic and she has been treated recently with a nerve block 2 weeks ago. She notes tenderness and pain in the back of her head.    There are no new symptoms reported that have not been a part of her chronic headache related symptoms.     Same day visit made with provider at office. She will call back with any concerns.     ---------------------------------------------

## 2025-01-30 NOTE — ED PROVIDER NOTES
Time reflects when diagnosis was documented in both MDM as applicable and the Disposition within this note       Time User Action Codes Description Comment    1/30/2025  8:09 PM Piyush Butcher Add [R51.9] Headache           ED Disposition       ED Disposition   Discharge    Condition   Stable    Date/Time   u Jan 30, 2025  8:09 PM    Comment   Gita UNIQUE Rendon discharge to home/self care.                   Assessment & Plan       Medical Decision Making  Difficult to distinguish current headache symptoms from existing diagnosis of cervicogenic headache. Does not have evidence of elevated ICP on exam. Only notable feature here is that the headache is worse following GONB: my concern for a direct procedural complication (specifically intramuscular hematoma, phlegmon/abscess) is low but not entirely excluded. Does not appear to have elevated ICP on basis of exam. Will provide typical migraine abortive therapy. CT soft tissue neck. Disposition pending.    Amount and/or Complexity of Data Reviewed  Labs: ordered. Decision-making details documented in ED Course.  Radiology: ordered. Decision-making details documented in ED Course.    Risk  Prescription drug management.        ED Course as of 01/31/25 1009   Thu Jan 30, 2025   1606 POCT pregnancy, urine   1606 CBC and differential(!)  Mild leukocytosis: She has had this previously   Hg/Hct wnl  Plt wnl   1618 Magnesium(!)  Hypomagnesemia; she is receiving magnesium repletion already as part of migraine treatment   1618 Comprehensive metabolic panel(!)  Hyperchloremia  Elevated BUN  Electrolytes otherwise normal  Renal function normal   Transaminases wnl   1625 Patient to CT scan now   1640 CT completed and awaiting interpretation   1653 CT soft tissue neck with contrast  IMAGE QUALITY:  Diagnostic.     FINDINGS:  There is minimal stranding in the suboccipital region which is an expected finding given the history of recent nerve block. There is no focal hematoma or  abscess in this area.     VISUALIZED BRAIN PARENCHYMA:  No acute intracranial pathology of the visualized brain parenchyma.     VISUALIZED ORBITS: Normal visualized orbits.     PARANASAL SINUSES: Normal visualized paranasal sinuses.     NASAL CAVITY AND NASOPHARYNX:  Normal.     SUPRAHYOID NECK:  Normal oral cavity, tongue base, tonsillar fossa and epiglottis.     INFRAHYOID NECK:  Aryepiglottic folds and piriform sinuses are normal.  Normal glottis and subglottic airway.     THYROID GLAND:  Unremarkable.     PAROTID AND SUBMANDIBULAR GLANDS:  Normal.     LYMPH NODES:  No pathologic or enlarged adenopathy. Subcentimeter nodes inferiorly on the left are likely reactive     VASCULAR STRUCTURES:  Normal enhancement of the cervical vasculature.     THORACIC INLET:  Lung apices and upper mediastinum are unremarkable.     BONY STRUCTURES: No acute fracture or destructive osseous lesion.     IMPRESSION:  No hematoma, focal fluid collection, or mass identified.  Mildly prominent left neck lymph nodes are likely reactive. There is no adenopathy by size or morphologic criteria.     Workstation performed: [id number]     1653 CT shows no acute pathology to account for symptoms; minor changes present consistent with be expected in the setting of recent nerve block.  No hematoma or abscess.   1713 CT results reviewed with patient.  She has had moderate improvement in headache compared to time of presentation. Still reports some tightness/soreness in the neck but headache in particular is significantly improved.  Reviewed with her whether she would prefer additional treatment to which she was agreeable.  Will provide valproic acid 1000 mg bolus dose and reevaluate.   1755: Sign over to Dr Butcher pending completion of valproate infusion and re-evaluation  Anticipate discharge with outpatient f/u    Medications   ketorolac (TORADOL) injection 30 mg (30 mg Intravenous Given 1/30/25 1551)   metoclopramide (REGLAN) injection 10 mg  (10 mg Intravenous Given 1/30/25 1552)   diphenhydrAMINE (BENADRYL) injection 25 mg (25 mg Intravenous Given 1/30/25 1552)   magnesium sulfate 2 g/50 mL IVPB (premix) 2 g (0 g Intravenous Stopped 1/30/25 1654)   sodium chloride 0.9 % bolus 1,000 mL (0 mL Intravenous Stopped 1/30/25 1705)   iohexol (OMNIPAQUE) 350 MG/ML injection (MULTI-DOSE) 85 mL (85 mL Intravenous Given 1/30/25 1633)   valproate (DEPACON) 1,000 mg in sodium chloride 0.9 % 50 mL BOLUS (0 mg Intravenous Stopped 1/30/25 1818)       ED Risk Strat Scores                          SBIRT 20yo+      Flowsheet Row Most Recent Value   Initial Alcohol Screen: US AUDIT-C     1. How often do you have a drink containing alcohol? 0 Filed at: 01/30/2025 1419   2. How many drinks containing alcohol do you have on a typical day you are drinking?  0 Filed at: 01/30/2025 1419   3a. Male UNDER 65: How often do you have five or more drinks on one occasion? 0 Filed at: 01/30/2025 1419   3b. FEMALE Any Age, or MALE 65+: How often do you have 4 or more drinks on one occassion? 0 Filed at: 01/30/2025 1419   Audit-C Score 0 Filed at: 01/30/2025 1419   JENNIFER: How many times in the past year have you...    Used an illegal drug or used a prescription medication for non-medical reasons? Never Filed at: 01/30/2025 1419                            History of Present Illness       Chief Complaint   Patient presents with    Migraine     Pt states that she started with a migraines for the past week. Pt has a hx of migraines.        Past Medical History:   Diagnosis Date    Allergic     Allergic rhinitis     Anorexia nervosa in remission     Anxiety     Arthritis 5/8/2014    Back pain     Bipolar disorder (HCC)     Depression     Dermatitis 10/16/2021    Disease of thyroid gland     Diverticulitis of colon 9/20/2015    Dizziness     Ear problems     Eating disorder     Esophageal reflux 08/15/2013    GERD (gastroesophageal reflux disease) 8/15/2013    Headache(784.0)     Headache,  tension-type     Hypertension     Hypothyroid     Idiopathic intracranial hypertension     Lumbar degenerative disc disease 05/08/2014    Migraine     Nasal congestion     Nosebleed     Obesity     Obsessive-compulsive disorder     Otitis media     Overactive bladder     Psychiatric disorder     Psychiatric illness     Restless leg syndrome, controlled     Seasonal allergies     Sinusitis     Sleep apnea     Sleep difficulties     Suicide and self-inflicted injury (HCC)     Tinnitus     TMJ dysfunction     Tonsillitis     Transcranial Magnetic Stimulation 09/06/2021    Urinary tract infection     Vision loss       Past Surgical History:   Procedure Laterality Date    DENTAL SURGERY      wisdom teeth-at [age] years. Other surgery dental extraxtion of bone spur (10 years ago)    IR LUMBAR PUNCTURE  02/26/2019    SINUS ENDOSCOPY      SINUS SURGERY      TONSILLECTOMY        Family History   Problem Relation Age of Onset    Mental illness Mother     Depression Mother     Suicide Attempts Mother     Hypertension Mother     Diabetes Mother     Other Mother         bicuspid aortic valve    Anxiety disorder Mother     Psychiatric Illness Mother     Schizoaffective Disorder  Mother     Anemia Mother     Hypertension Father     Hypothyroidism Father     Thyroid disease Father     Hypertension Brother     Cancer Maternal Grandmother     Heart disease Maternal Grandmother     Stroke Maternal Grandmother     Breast cancer Maternal Grandmother     Skin cancer Maternal Grandmother     Arthritis Maternal Grandmother     Pneumonia Maternal Grandfather     Cancer Maternal Grandfather     Dementia Maternal Grandfather     COPD Maternal Grandfather     Cancer Paternal Grandmother     Pneumonia Paternal Grandfather     Arthritis Family     Breast cancer Family     Osteopenia Family     Osteoporosis Family     Hypertension Family     Transient ischemic attack Family       Social History     Tobacco Use    Smoking status: Never     Passive  exposure: Never    Smokeless tobacco: Never   Vaping Use    Vaping status: Never Used   Substance Use Topics    Alcohol use: Never    Drug use: Never      E-Cigarette/Vaping    E-Cigarette Use Never User       E-Cigarette/Vaping Substances    Nicotine No     THC No     CBD No     Flavoring No     Other No     Unknown No       I have reviewed and agree with the history as documented.     39F with noted past medical history presents to the emergency department with occipital headache present for about the the past 10-12 days, primarily in the cervical spine region and suboccipital region associated with nausea and arthralgia/myalgias in the past several days.  Pain seems to be worse with head flexion and rotation to either side: She underwent bilateral greater occipital nerve block on 15 January 2025 at her neurologist's office but unfortunately notes worsening of pain since the procedure itself.  She feels particular tenderness in the suboccipital region where the procedure was performed.  She has not felt any induration or erythema or swelling in that area since that point.    Has longstanding history of recurrent headache since childhood, with approximately 6-year history of cervicogenic headache. Has independent diagnosis of IIH for which she is prescribed acetazolamide which she is taking as prescribed.  Typical headaches are primarily in the paracervical region with some radiation to the occiput accompanied with mild photophobia, particularly worse with certain smells.  No fevers or chills.  No cough/wheeze/vomiting/fever/rhinorrhea/nasal congestion/odynophagia abdominal pain/diarrhea.  No extremity weakness or paresthesias.        History provided by:  Medical records and patient  Migraine  Associated symptoms: headaches and nausea    Associated symptoms: no abdominal pain, no fatigue, no fever and no vomiting        Review of Systems   Constitutional:  Negative for chills, fatigue and fever.   Eyes:  Negative  for photophobia and visual disturbance.   Respiratory: Negative.     Cardiovascular: Negative.    Gastrointestinal:  Positive for nausea. Negative for abdominal pain and vomiting.   Musculoskeletal:  Positive for arthralgias, back pain, neck pain and neck stiffness.   Skin: Negative.    Neurological:  Positive for weakness and headaches. Negative for dizziness, syncope, facial asymmetry, speech difficulty and light-headedness.   Hematological: Negative.            Objective       ED Triage Vitals [01/30/25 1420]   Temperature Pulse Blood Pressure Respirations SpO2 Patient Position - Orthostatic VS   98.4 °F (36.9 °C) 64 133/67 18 100 % Lying      Temp Source Heart Rate Source BP Location FiO2 (%) Pain Score    Temporal Monitor Left arm -- 6      Vitals      Date and Time Temp Pulse SpO2 Resp BP Pain Score FACES Pain Rating User   01/30/25 2000 98.4 °F (36.9 °C) 59 96 % 18 113/59 3 -- AB   01/30/25 1900 98.4 °F (36.9 °C) 58 97 % 16 118/56 3 -- AB   01/30/25 1700 98.4 °F (36.9 °C) 61 100 % 16 113/54 3 -- AB   01/30/25 1600 98.4 °F (36.9 °C) 63 100 % 14 123/58 5 -- AB   01/30/25 1551 -- -- -- -- -- 5 -- AB   01/30/25 1500 98.4 °F (36.9 °C) 60 100 % 18 113/58 6 -- AB   01/30/25 1420 98.4 °F (36.9 °C) 64 100 % 18 133/67 6 -- AB            Physical Exam  Vitals and nursing note reviewed.   Constitutional:       General: She is awake. She is not in acute distress.     Appearance: Normal appearance. She is well-developed.   HENT:      Head: Normocephalic and atraumatic.      Right Ear: Hearing and external ear normal.      Left Ear: Hearing and external ear normal.   Eyes:      General: Lids are normal. Vision grossly intact.      Extraocular Movements: Extraocular movements intact.      Conjunctiva/sclera: Conjunctivae normal.      Pupils: Pupils are equal, round, and reactive to light.      Funduscopic exam:     Right eye: No hemorrhage or papilledema.         Left eye: No hemorrhage or papilledema.   Neck:      Trachea:  Trachea and phonation normal.        Comments: No posterior midline tenderness to palpation or step-off  Bilateral suboccipital and paravertebral hypertonicity and tenderness in the indicated distribution; no overlying skin changes  Cardiovascular:      Rate and Rhythm: Normal rate and regular rhythm.      Pulses:           Radial pulses are 2+ on the right side and 2+ on the left side.        Dorsalis pedis pulses are 2+ on the right side and 2+ on the left side.        Posterior tibial pulses are 2+ on the right side and 2+ on the left side.      Heart sounds: Normal heart sounds, S1 normal and S2 normal. No murmur heard.     No friction rub. No gallop.   Pulmonary:      Effort: Pulmonary effort is normal. No respiratory distress.      Breath sounds: Normal breath sounds. No stridor. No decreased breath sounds, wheezing, rhonchi or rales.   Abdominal:      General: There is no distension.      Palpations: There is no mass.      Tenderness: There is no abdominal tenderness. There is no guarding or rebound.   Skin:     General: Skin is warm and dry.   Neurological:      Mental Status: She is alert and oriented to person, place, and time.      GCS: GCS eye subscore is 4. GCS verbal subscore is 5. GCS motor subscore is 6.      Cranial Nerves: No cranial nerve deficit.      Sensory: No sensory deficit.      Motor: No abnormal muscle tone.      Comments: PERRLA; EOMI. Sensation intact to light touch over face in V1-V3 distribution bilaterally. Facial expressions symmetric. Tongue/uvula midline. Shoulder shrug equal bilaterally. Strength 5/5 in UE/LE bilaterally. Sensation intact to light touch in UE/LE bilaterally.         Results Reviewed       Procedure Component Value Units Date/Time    Magnesium [[id number]]  (Abnormal) Collected: 01/30/25 1541    Lab Status: Final result Specimen: Blood from Arm, Right Updated: 01/30/25 1611     Magnesium 1.8 mg/dL     Comprehensive metabolic panel [[id number]]  (Abnormal) Collected:  01/30/25 1541    Lab Status: Final result Specimen: Blood from Arm, Right Updated: 01/30/25 1610     Sodium 141 mmol/L      Potassium 3.6 mmol/L      Chloride 112 mmol/L      CO2 21 mmol/L      ANION GAP 8 mmol/L      BUN 32 mg/dL      Creatinine 0.70 mg/dL      Glucose 83 mg/dL      Calcium 8.8 mg/dL      AST 10 U/L      ALT 12 U/L      Alkaline Phosphatase 72 U/L      Total Protein 6.0 g/dL      Albumin 3.8 g/dL      Total Bilirubin 0.23 mg/dL      eGFR 108 ml/min/1.73sq m     Narrative:      National Kid[employer] guidelines for Chronic Kidney Disease (CKD):     Stage 1 with normal or high GFR (GFR > 90 mL/min/1.73 square meters)    Stage 2 Mild CKD (GFR = 60-89 mL/min/1.73 square meters)    Stage 3A Moderate CKD (GFR = 45-59 mL/min/1.73 square meters)    Stage 3B Moderate CKD (GFR = 30-44 mL/min/1.73 square meters)    Stage 4 Severe CKD (GFR = 15-29 mL/min/1.73 square meters)    Stage 5 End Stage CKD (GFR <15 mL/min/1.73 square meters)  Note: GFR calculation is accurate only with a steady state creatinine    CBC and differential [[id number]]  (Abnormal) Collected: 01/30/25 1541    Lab Status: Final result Specimen: Blood from Arm, Right Updated: 01/30/25 1552     WBC 12.09 Thousand/uL      RBC 4.72 Million/uL      Hemoglobin 13.5 g/dL      Hematocrit 42.5 %      MCV 90 fL      MCH 28.6 pg      MCHC 31.8 g/dL      RDW 13.2 %      MPV 9.8 fL      Platelets 290 Thousands/uL      nRBC 0 /100 WBCs      Segmented % 68 %      Immature Grans % 0 %      Lymphocytes % 22 %      Monocytes % 7 %      Eosinophils Relative 2 %      Basophils Relative 1 %      Absolute Neutrophils 8.13 Thousands/µL      Absolute Immature Grans 0.04 Thousand/uL      Absolute Lymphocytes 2.69 Thousands/µL      Absolute Monocytes 0.87 Thousand/µL      Eosinophils Absolute 0.29 Thousand/µL      Basophils Absolute 0.07 Thousands/µL     POCT pregnancy, urine [[id number]]  (Normal) Collected: 01/30/25 1542    Lab Status: Final result  Updated: 01/30/25 1542     EXT Preg Test, Ur Negative     Control Valid            CT soft tissue neck with contrast   Final Interpretation by Favio Avila MD (01/30 9892)   No hematoma, focal fluid collection, or mass identified.   Mildly prominent left neck lymph nodes are likely reactive. There is no adenopathy by size or morphologic criteria.      Workstation performed: [id number]             Procedures    ED Medication and Procedure Management   Prior to Admission Medications   Prescriptions Last Dose Informant Patient Reported? Taking?   Ajovy 225 MG/1.5ML auto-injector  Self Yes No   Sig: Inject 225 mg under the skin every 30 (thirty) days Last  weeks  not  sure  what day   Cequa 0.09 % SOLN  Self No No   Sig: Apply 1 drop to eye 2 (two) times a day   DULoxetine (CYMBALTA) 60 mg delayed release capsule  Self No No   Sig: Take 2 capsules (120 mg total) by mouth daily   Linzess 290 MCG CAPS  Self No No   Sig: take 1 capsule by mouth once daily   Mirabegron ER (Myrbetriq) 50 MG TB24  Self No No   Sig: take 1 tablet by mouth every morning   Multiple Vitamin (MULTIVITAMIN) capsule  Self Yes No   Sig: Take 1 capsule by mouth daily   Nurtec 75 MG TBDP  Self Yes No   Sig: Take 75 mg by mouth every other day   Vraylar 3 MG capsule   Yes No   Sig: take 1 capsule by mouth BEFORE BEDTIME   acetaZOLAMIDE (DIAMOX) 250 mg tablet  Self No No   Sig: Take 2 tablets (500 mg total) by mouth 2 (two) times a day   benzonatate (TESSALON) 200 MG capsule  Self No No   Sig: Take 1 capsule (200 mg total) by mouth 3 (three) times a day as needed for cough   cariprazine (Vraylar) 6 MG capsule  Self No No   Sig: Take 1 capsule (6 mg total) by mouth daily   cariprazine (Vraylar) 6 MG capsule  Self Yes No   Sig: Take 6 mg by mouth daily   celecoxib (CeleBREX) 200 mg capsule  Self No No   Sig: Take 1 capsule (200 mg total) by mouth 2 (two) times a day   cetirizine (ZyrTEC) 10 MG chewable tablet  Self Yes No   Sig: Chew 10 mg daily    dihydroergotamine (MIGRANAL) 4 MG/ML nasal spray  Self Yes No   Si spray into each nostril as needed   docusate sodium (COLACE) 100 mg capsule  Self Yes No   Sig: Take 100 mg by mouth 2 (two) times a day   gabapentin (NEURONTIN) 300 mg capsule  Self No No   Sig: Take 1 capsule (300 mg total) by mouth daily after breakfast   gabapentin (NEURONTIN) 600 MG tablet  Self No No   Sig: Take 1.5 tablets (900 mg total) by mouth daily at bedtime   levothyroxine 112 mcg tablet  Self No No   Sig: Take 1 tablet (112 mcg total) by mouth daily in the early morning   linaCLOtide (Linzess) 290 MCG CAPS  Self No No   Sig: Take 1 capsule by mouth daily   metFORMIN (GLUCOPHAGE) 500 mg tablet   No No   Sig: Take 1 tablet (500 mg total) by mouth daily with breakfast   naratriptan (AMERGE) 2.5 MG tablet  Self Yes No   Sig: take 1 tablet by mouth AT ONSET OF MIGRAINE may repeat in 2 hours...  (REFER TO PRESCRIPTION NOTES).   omeprazole (PriLOSEC) 20 mg delayed release capsule  Self No No   Sig: Take 1 capsule (20 mg total) by mouth 2 (two) times a day   tirzepatide (Zepbound) 10 mg/0.5 mL auto-injector  Self No No   Sig: Inject 0.5 mL (10 mg total) under the skin once a week   Patient not taking: Reported on 2025   topiramate (TOPAMAX) 100 mg tablet  Self Yes No   Sig: Take 150 mg by mouth 2 (two) times a day TAKE 1.5 TABLETS BID      Facility-Administered Medications: None     Discharge Medication List as of 2025  8:10 PM        CONTINUE these medications which have NOT CHANGED    Details   acetaZOLAMIDE (DIAMOX) 250 mg tablet Take 2 tablets (500 mg total) by mouth 2 (two) times a day, Starting Mon 2024, No Print      Ajovy 225 MG/1.5ML auto-injector Inject 225 mg under the skin every 30 (thirty) days Last  weeks  not  sure  what day, Starting Wed 2024, Historical Med      benzonatate (TESSALON) 200 MG capsule Take 1 capsule (200 mg total) by mouth 3 (three) times a day as needed for cough, Starting Tue  11/12/2024, Normal      !! cariprazine (Vraylar) 6 MG capsule Take 6 mg by mouth daily, Historical Med      celecoxib (CeleBREX) 200 mg capsule Take 1 capsule (200 mg total) by mouth 2 (two) times a day, Starting Tue 4/16/2024, Normal      Cequa 0.09 % SOLN Apply 1 drop to eye 2 (two) times a day, Starting Sat 8/5/2023, Normal      cetirizine (ZyrTEC) 10 MG chewable tablet Chew 10 mg daily, Historical Med      dihydroergotamine (MIGRANAL) 4 MG/ML nasal spray 1 spray into each nostril as needed, Starting Mon 11/11/2024, Historical Med      docusate sodium (COLACE) 100 mg capsule Take 100 mg by mouth 2 (two) times a day, Historical Med      DULoxetine (CYMBALTA) 60 mg delayed release capsule Take 2 capsules (120 mg total) by mouth daily, Starting Tue 11/5/2024, Until Thu 12/5/2024, Normal      gabapentin (NEURONTIN) 300 mg capsule Take 1 capsule (300 mg total) by mouth daily after breakfast, Starting Fri 7/12/2024, No Print      gabapentin (NEURONTIN) 600 MG tablet Take 1.5 tablets (900 mg total) by mouth daily at bedtime, Starting Tue 4/16/2024, Normal      levothyroxine 112 mcg tablet Take 1 tablet (112 mcg total) by mouth daily in the early morning, Starting Mon 11/25/2024, Normal      !! linaCLOtide (Linzess) 290 MCG CAPS Take 1 capsule by mouth daily, Starting Tue 12/31/2024, Until Sun 6/29/2025, Normal      !! Linzess 290 MCG CAPS take 1 capsule by mouth once daily, Starting Thu 1/2/2025, Normal      metFORMIN (GLUCOPHAGE) 500 mg tablet Take 1 tablet (500 mg total) by mouth daily with breakfast, Starting Mon 1/27/2025, Normal      Mirabegron ER (Myrbetriq) 50 MG TB24 take 1 tablet by mouth every morning, Starting Mon 12/16/2024, Normal      Multiple Vitamin (MULTIVITAMIN) capsule Take 1 capsule by mouth daily, Historical Med      naratriptan (AMERGE) 2.5 MG tablet take 1 tablet by mouth AT ONSET OF MIGRAINE may repeat in 2 hours...  (REFER TO PRESCRIPTION NOTES)., Historical Med      Nurtec 75 MG TBDP Take 75 mg  by mouth every other day, Starting Tue 9/3/2024, Historical Med      omeprazole (PriLOSEC) 20 mg delayed release capsule Take 1 capsule (20 mg total) by mouth 2 (two) times a day, Starting Mon 1/6/2025, Normal      tirzepatide (Zepbound) 10 mg/0.5 mL auto-injector Inject 0.5 mL (10 mg total) under the skin once a week, Starting Tue 1/14/2025, Normal      topiramate (TOPAMAX) 100 mg tablet Take 150 mg by mouth 2 (two) times a day TAKE 1.5 TABLETS BID, Starting Sat 6/1/2024, Historical Med      !! Vraylar 3 MG capsule take 1 capsule by mouth BEFORE BEDTIME, Historical Med       !! - Potential duplicate medications found. Please discuss with provider.        No discharge procedures on file.  ED SEPSIS DOCUMENTATION   Time reflects when diagnosis was documented in both MDM as applicable and the Disposition within this note       Time User Action Codes Description Comment    1/30/2025  8:09 PM Piyush Butcher Add [R51.9] Headache                  Krunal Palmer DO  01/31/25 1011

## 2025-02-11 NOTE — TELEPHONE ENCOUNTER
Pt called requesting the phone number to Neurology.  Pt was provided the Neurologist phone number and a soft transfer was done.

## 2025-02-13 ENCOUNTER — OFFICE VISIT (OUTPATIENT)
Dept: UROLOGY | Facility: CLINIC | Age: 40
End: 2025-02-13
Payer: COMMERCIAL

## 2025-02-13 VITALS
TEMPERATURE: 97.3 F | OXYGEN SATURATION: 100 % | HEIGHT: 67 IN | BODY MASS INDEX: 45.99 KG/M2 | HEART RATE: 70 BPM | SYSTOLIC BLOOD PRESSURE: 120 MMHG | DIASTOLIC BLOOD PRESSURE: 84 MMHG | WEIGHT: 293 LBS

## 2025-02-13 DIAGNOSIS — N32.81 OVERACTIVE BLADDER: Primary | ICD-10-CM

## 2025-02-13 PROCEDURE — 99213 OFFICE O/P EST LOW 20 MIN: CPT

## 2025-02-13 NOTE — ASSESSMENT & PLAN NOTE
I had a lengthy discussion with her today regarding management of her overactive bladder.  She has failed Myrbetriq and oxybutynin previously.  We discussed options to trial a different anticholinergic or consider Gemtesa.  She would like to try Gemtesa if this is affordable.  She would also like to retry pelvic floor physical therapy as she felt this was the most effective for her in the past.  We also discussed neck step management with either Botox or InterStim.  For this reason I am recommending we set her up for cystoscopy in the event she were to fail Gemtesa.    Orders:    Ambulatory Referral to Urology    Vibegron 75 MG TABS; Take 75 mg by mouth in the morning    Cystoscopy    Ambulatory Referral to Physical Therapy; Future

## 2025-02-13 NOTE — PROGRESS NOTES
Name: Gita Rendon      : 1985      MRN: [id number]  Encounter Provider: LEIGHA Romero  Encounter Date: 2025   Encounter department: [facility]OLOG[facility] [place]    :  Assessment & Plan  Overactive bladder  I had a lengthy discussion with her today regarding management of her overactive bladder.  She has failed Myrbetriq and oxybutynin previously.  We discussed options to trial a different anticholinergic or consider Gemtesa.  She would like to try Gemtesa if this is affordable.  She would also like to retry pelvic floor physical therapy as she felt this was the most effective for her in the past.  We also discussed neck step management with either Botox or InterStim.  For this reason I am recommending we set her up for cystoscopy in the event she were to fail Gemtesa.    Orders:    Ambulatory Referral to Urology    Vibegron 75 MG TABS; Take 75 mg by mouth in the morning    Cystoscopy    Ambulatory Referral to Physical Therapy; Future          Interval HPI:    She presents today for follow-up of her overactive bladder.  Since I saw her last she has since stopped taking oxybutynin as she felt that this was causing worsening dry eyes.  Her PCP switched her to Myrbetriq 50 mg daily which has not been really effective for her.  She had been on this in the past which was not very effective then.  She feels that what helped the most previously was pelvic floor physical therapy.  She is also tried PTNS which she did not think did much at all for her.  Oxybutynin was working for her but after she increased her dose she was starting to experience some side effects.      History of Present Illness     Established patient last seen by me in 2024 for evaluation of sudden onset of urinary frequency and urgency.  At the time she reported that approximately 2 weeks prior to her visit she was seen by her PCP who had referred her to the emergency department due to concerns for possible  obstructing kidney stone.  She had been complaining of flank pain with increased urinary frequency and urgency.  She was seen at [facility] emergency department on 5/22/2024.  CT imaging was unremarkable without evidence of urinary tract calculi or hydronephrosis bilaterally.  Urinary bladder was also unremarkable.  Urinalysis showed trace leukocytes but negative nitrates or blood.  Microscopic analysis however showed no RBCs, 0-1 WBCs, no bacteria.  She was however treated for suspected UTI and was prescribed Keflex.  Her frequency and urgency did not improve.  Her PCP then started her on oxybutynin initially at 10 mg for which she had since increased to 15 mg daily.     At the time of her office visit with me she reported improvement in her frequency after increasing oxybutynin to 15 mg which I recommended she continue. She also reported lower extremity edema and took Lasix which seems to have also helped her frequency and urgency.  She was prescribed Lasix due to her history of IIH (idiopathic intracranial hypertension) but she typically takes Diamox instead which is more effective for her. She was instructed to follow-up on an as-needed basis but if her symptoms did not improve I would recommend pelvic floor physical therapy and she could increase her oxybutynin to a max dose of 30 mg daily if necessary.    History of urinary frequency and urgency and previously followed with Dr. Ardon in the past.  She had good results from PTNS and pelvic floor physical therapy previously.  In the past she had been on both Myrbetriq and oxybutynin.  She feels that oxybutynin provides her better relief of her symptoms than Myrbetriq.         Objective   LMP 12/12/2024 (Approximate)     Review of Systems   Constitutional:  Negative for chills and fever.   HENT:  Negative for ear pain and sore throat.    Eyes:  Negative for pain and visual disturbance.   Respiratory:  Negative for cough and shortness of breath.     Cardiovascular:  Negative for chest pain and palpitations.   Gastrointestinal:  Negative for abdominal pain and vomiting.   Genitourinary:  Positive for frequency and urgency. Negative for dysuria and hematuria.   Musculoskeletal:  Negative for arthralgias and back pain.   Skin:  Negative for color change and rash.   Neurological:  Negative for seizures and syncope.   All other systems reviewed and are negative.      Physical Exam  Vitals and nursing note reviewed.   Constitutional:       General: She is not in acute distress.     Appearance: She is well-developed.   HENT:      Head: Normocephalic and atraumatic.   Eyes:      Conjunctiva/sclera: Conjunctivae normal.   Cardiovascular:      Rate and Rhythm: Normal rate and regular rhythm.      Heart sounds: No murmur heard.  Pulmonary:      Effort: Pulmonary effort is normal. No respiratory distress.      Breath sounds: Normal breath sounds.   Abdominal:      Palpations: Abdomen is soft.      Tenderness: There is no abdominal tenderness.   Musculoskeletal:         General: No swelling.      Cervical back: Neck supple.   Skin:     General: Skin is warm and dry.      Capillary Refill: Capillary refill takes less than 2 seconds.   Neurological:      Mental Status: She is alert.   Psychiatric:         Mood and Affect: Mood normal.           Imaging:          Please Note:  Voice dictation software has been used to create this document. There may be inadvertent transcriptions errors.     LEIGHA Romero 02/13/25

## 2025-02-14 ENCOUNTER — TELEPHONE (OUTPATIENT)
Dept: UROLOGY | Facility: AMBULATORY SURGERY CENTER | Age: 40
End: 2025-02-14

## 2025-02-14 NOTE — TELEPHONE ENCOUNTER
PA for GEMTESA 75 MG SUBMITTED to [employer]    via      [x]Other website [id number] EOC ID   PROMPT PA     [x]PA sent as URGENT    All office notes, labs and other pertaining documents and studies sent. Clinical questions answered. Awaiting determination from insurance company.     Turnaround time for your insurance to make a decision on your Prior Authorization can take 7-21 business days.

## 2025-02-17 NOTE — TELEPHONE ENCOUNTER
PA for GEMTESA APPROVED     Date(s) approved UNTIL 02/14/2025        Patient advised by          [x]MyChart Message  []Phone call   []LMOM  []L/M to call office as no active Communication consent on file  []Unable to leave detailed message as VM not approved on Communication consent       Pharmacy advised by    [x]Fax  []Phone call         Approval letter scanned into [employer] Yes

## 2025-02-21 DIAGNOSIS — E03.9 ACQUIRED HYPOTHYROIDISM: ICD-10-CM

## 2025-02-21 RX ORDER — LEVOTHYROXINE SODIUM 112 UG/1
112 TABLET ORAL
Qty: 90 TABLET | Refills: 1 | Status: SHIPPED | OUTPATIENT
Start: 2025-02-21

## 2025-02-27 NOTE — TELEPHONE ENCOUNTER
[facility] called in stating that on 2/21 the script on their end is showing it was cancelled. Did let them know that it looks like on our end the medication was confirmed received by Pharmacy on 2/21. Pharmacy is asking for a new script to be sent over. Please advise

## 2025-02-27 NOTE — TELEPHONE ENCOUNTER
Patient called to follow up on levothyroxine prescription. She states she will be out of the medication tomorrow, 2/28/2025. She states [facility] told her that all of her prescriptions were cancelled by PCP office. She verbalized concern about getting her medication.    Spoke with Lou at PCP office, who is aware of patient's concern and who will further assist with this issue.

## 2025-02-27 NOTE — TELEPHONE ENCOUNTER
Pt called stating that Pharmacy has still not received script for her levothyroxine 112 mcg tablet  medication. She had called pharmacy this morning and was following up with office due to them stating the medication was discontinued. CTS attempted to call [facility] for clarification but was unable to get through to pharmacist. Unsure if new script should be sent for medication to [facility]. Please advise.

## 2025-02-28 DIAGNOSIS — E03.9 ACQUIRED HYPOTHYROIDISM: ICD-10-CM

## 2025-02-28 RX ORDER — LEVOTHYROXINE SODIUM 112 UG/1
112 TABLET ORAL
Qty: 90 TABLET | Refills: 1 | Status: SHIPPED | OUTPATIENT
Start: 2025-02-28

## 2025-03-04 ENCOUNTER — OFFICE VISIT (OUTPATIENT)
Dept: URGENT CARE | Facility: CLINIC | Age: 40
End: 2025-03-04
Payer: COMMERCIAL

## 2025-03-04 VITALS
BODY MASS INDEX: 45.99 KG/M2 | TEMPERATURE: 97.7 F | OXYGEN SATURATION: 100 % | SYSTOLIC BLOOD PRESSURE: 118 MMHG | HEIGHT: 67 IN | RESPIRATION RATE: 18 BRPM | WEIGHT: 293 LBS | HEART RATE: 74 BPM | DIASTOLIC BLOOD PRESSURE: 80 MMHG

## 2025-03-04 DIAGNOSIS — E66.01 MORBID OBESITY (HCC): ICD-10-CM

## 2025-03-04 DIAGNOSIS — J02.9 SORE THROAT: Primary | ICD-10-CM

## 2025-03-04 LAB — S PYO AG THROAT QL: NEGATIVE

## 2025-03-04 PROCEDURE — 87070 CULTURE OTHR SPECIMN AEROBIC: CPT | Performed by: PHYSICIAN ASSISTANT

## 2025-03-04 PROCEDURE — S9088 SERVICES PROVIDED IN URGENT: HCPCS | Performed by: PHYSICIAN ASSISTANT

## 2025-03-04 PROCEDURE — 99212 OFFICE O/P EST SF 10 MIN: CPT | Performed by: PHYSICIAN ASSISTANT

## 2025-03-04 PROCEDURE — 87880 STREP A ASSAY W/OPTIC: CPT | Performed by: PHYSICIAN ASSISTANT

## 2025-03-04 RX ORDER — CANDESARTAN 8 MG/1
8 TABLET ORAL DAILY
COMMUNITY
Start: 2025-03-03 | End: 2026-03-03

## 2025-03-04 RX ORDER — FLUVOXAMINE MALEATE 25 MG
1 TABLET ORAL DAILY
COMMUNITY
Start: 2025-02-17

## 2025-03-04 RX ORDER — TIRZEPATIDE 10 MG/.5ML
10 INJECTION, SOLUTION SUBCUTANEOUS WEEKLY
Qty: 2 ML | Refills: 1 | Status: SHIPPED | OUTPATIENT
Start: 2025-03-04

## 2025-03-04 NOTE — PATIENT INSTRUCTIONS
Tea with honey  Salt water gargles  Tylenol or Ibuprofen as needed for pain  Lozenges  If symptoms worsen have yourself rechecked

## 2025-03-04 NOTE — PROGRESS NOTES
[facility]        NAME: Gita Rendon is a 39 y.o. female  : 1985    MRN: [id number]  DATE: 2025  TIME: 10:13 AM    Assessment and Plan   Sore throat [J02.9]  1. Sore throat  POCT rapid ANTIGEN strepA    Throat culture            Patient Instructions     Tea with honey  Salt water gargles  Tylenol or Ibuprofen as needed for pain  Lozenges  If symptoms worsen have yourself rechecked    Follow up with PCP in 3-5 days.  Proceed to  ER if symptoms worsen.    If tests have been performed at [facility], our office will contact you with results if changes need to be made to the care plan discussed with you at the visit.  You can review your full results on [facility].    Chief Complaint     Chief Complaint   Patient presents with   • Sore Throat     Sore throat and runny nose x 3 days. 7/10 sore throat pain currently.         History of Present Illness       Sore Throat   This is a new problem. The current episode started in the past 7 days. The problem has been rapidly worsening. Neither side of throat is experiencing more pain than the other. There has been no fever. The pain is at a severity of 8/10. The pain is moderate. Associated symptoms include headaches, swollen glands and trouble swallowing. Pertinent negatives include no abdominal pain, congestion, coughing, diarrhea, drooling, ear discharge, ear pain, hoarse voice, plugged ear sensation, neck pain, shortness of breath, stridor or vomiting.       Review of Systems   Review of Systems   Constitutional:  Negative for chills and fever.   HENT:  Positive for sore throat and trouble swallowing. Negative for congestion, drooling, ear discharge, ear pain and hoarse voice.    Respiratory:  Negative for cough, shortness of breath and stridor.    Gastrointestinal:  Negative for abdominal pain, diarrhea and vomiting.   Musculoskeletal:  Negative for neck pain.   Neurological:  Positive for headaches.         Current Medications        Current Outpatient Medications:   •  acetaZOLAMIDE (DIAMOX) 250 mg tablet, Take 2 tablets (500 mg total) by mouth 2 (two) times a day, Disp: , Rfl:   •  candesartan (ATACAND) 8 MG tablet, Take 8 mg by mouth daily, Disp: , Rfl:   •  cariprazine (VRAYLAR) 3 MG capsule, Take 6 mg by mouth daily at bedtime, Disp: , Rfl:   •  cariprazine (Vraylar) 6 MG capsule, Take 1 capsule (6 mg total) by mouth daily, Disp: 30 capsule, Rfl: 0  •  celecoxib (CeleBREX) 200 mg capsule, Take 1 capsule (200 mg total) by mouth 2 (two) times a day, Disp: 60 capsule, Rfl: 0  •  Cequa 0.09 % SOLN, Apply 1 drop to eye 2 (two) times a day, Disp: 60 each, Rfl: 0  •  cetirizine (ZyrTEC) 10 MG chewable tablet, Chew 10 mg daily, Disp: , Rfl:   •  docusate sodium (COLACE) 100 mg capsule, Take 100 mg by mouth 2 (two) times a day, Disp: , Rfl:   •  fluvoxaMINE (LUVOX) 25 MG tablet, Take 1 tablet by mouth in the morning, Disp: , Rfl:   •  gabapentin (NEURONTIN) 300 mg capsule, Take 1 capsule (300 mg total) by mouth daily after breakfast, Disp: , Rfl:   •  gabapentin (NEURONTIN) 600 MG tablet, Take 1.5 tablets (900 mg total) by mouth daily at bedtime, Disp: 45 tablet, Rfl: 0  •  levothyroxine 112 mcg tablet, Take 1 tablet (112 mcg total) by mouth daily in the early morning, Disp: 90 tablet, Rfl: 1  •  linaCLOtide (Linzess) 290 MCG CAPS, Take 1 capsule by mouth daily, Disp: 90 capsule, Rfl: 1  •  Linzess 290 MCG CAPS, take 1 capsule by mouth once daily, Disp: 90 capsule, Rfl: 1  •  metFORMIN (GLUCOPHAGE) 500 mg tablet, Take 1 tablet (500 mg total) by mouth daily with breakfast, Disp: 90 tablet, Rfl: 1  •  Multiple Vitamin (MULTIVITAMIN) capsule, Take 1 capsule by mouth daily, Disp: , Rfl:   •  naratriptan (AMERGE) 2.5 MG tablet, take 1 tablet by mouth AT ONSET OF MIGRAINE may repeat in 2 hours...  (REFER TO PRESCRIPTION NOTES)., Disp: , Rfl:   •  Nurtec 75 MG TBDP, Take 75 mg by mouth every other day, Disp: , Rfl:   •  omeprazole (PriLOSEC) 20 mg  delayed release capsule, Take 1 capsule (20 mg total) by mouth 2 (two) times a day, Disp: 180 capsule, Rfl: 0  •  tirzepatide (Zepbound) 10 mg/0.5 mL auto-injector, Inject 0.5 mL (10 mg total) under the skin once a week, Disp: 2 mL, Rfl: 1  •  topiramate (TOPAMAX) 100 mg tablet, Take 150 mg by mouth 2 (two) times a day TAKE 1.5 TABLETS BID, Disp: , Rfl:   •  Vibegron 75 MG TABS, Take 75 mg by mouth in the morning, Disp: 30 tablet, Rfl: 3  •  DULoxetine (CYMBALTA) 60 mg delayed release capsule, Take 2 capsules (120 mg total) by mouth daily, Disp: 60 capsule, Rfl: 0  •  Mirabegron ER (Myrbetriq) 50 MG TB24, take 1 tablet by mouth every morning, Disp: 90 tablet, Rfl: 1  •  Vraylar 3 MG capsule, take 1 capsule by mouth BEFORE BEDTIME (Patient not taking: Reported on 3/4/2025), Disp: , Rfl:     Current Allergies     Allergies as of 03/04/2025 - Reviewed 03/04/2025   Allergen Reaction Noted   • Doxycycline  03/28/2019   • Other Other (See Comments) 02/26/2018            The following portions of the patient's history were reviewed and updated as appropriate: allergies, current medications, past family history, past medical history, past social history, past surgical history and problem list.     Past Medical History:   Diagnosis Date   • Allergic    • Allergic rhinitis    • Anorexia nervosa in remission    • Anxiety    • Arthritis 5/8/2014   • Back pain    • Bipolar disorder (HCC)    • Depression    • Dermatitis 10/16/2021   • Disease of thyroid gland    • Diverticulitis of colon 9/20/2015   • Dizziness    • Ear problems    • Eating disorder    • Esophageal reflux 08/15/2013   • GERD (gastroesophageal reflux disease) 8/15/2013   • Headache(784.0)    • Headache, tension-type    • Hypertension    • Hypothyroid    • Idiopathic intracranial hypertension    • Lumbar degenerative disc disease 05/08/2014   • Migraine    • Nasal congestion    • Nosebleed    • Obesity    • Obsessive-compulsive disorder    • Otitis media    •  "Overactive bladder    • Psychiatric disorder    • Psychiatric illness    • Restless leg syndrome, controlled    • Seasonal allergies    • Sinusitis    • Sleep apnea    • Sleep difficulties    • Suicide and self-inflicted injury (HCC)    • Tinnitus    • TMJ dysfunction    • Tonsillitis    • Transcranial Magnetic Stimulation 09/06/2021   • Urinary tract infection    • Vision loss        Past Surgical History:   Procedure Laterality Date   • DENTAL SURGERY      wisdom teeth-at [age] years. Other surgery dental extraxtion of bone spur (10 years ago)   • IR LUMBAR PUNCTURE  02/26/2019   • SINUS ENDOSCOPY     • SINUS SURGERY     • TONSILLECTOMY         Family History   Problem Relation Age of Onset   • Mental illness Mother    • Depression Mother    • Suicide Attempts Mother    • Hypertension Mother    • Diabetes Mother    • Other Mother         bicuspid aortic valve   • Anxiety disorder Mother    • Psychiatric Illness Mother    • Schizoaffective Disorder  Mother    • Anemia Mother    • Hypertension Father    • Hypothyroidism Father    • Thyroid disease Father    • Hypertension Brother    • Cancer Maternal Grandmother    • Heart disease Maternal Grandmother    • Stroke Maternal Grandmother    • Breast cancer Maternal Grandmother    • Skin cancer Maternal Grandmother    • Arthritis Maternal Grandmother    • Pneumonia Maternal Grandfather    • Cancer Maternal Grandfather    • Dementia Maternal Grandfather    • COPD Maternal Grandfather    • Cancer Paternal Grandmother    • Pneumonia Paternal Grandfather    • Arthritis Family    • Breast cancer Family    • Osteopenia Family    • Osteoporosis Family    • Hypertension Family    • Transient ischemic attack Family          Medications have been verified.        Objective   /80   Pulse 74   Temp 97.7 °F (36.5 °C)   Resp 18   Ht 5' 7\" (1.702 m)   Wt (!) 152 kg (335 lb)   SpO2 100%   BMI 52.47 kg/m²   No LMP recorded.       Physical Exam     Physical Exam  Vitals and " nursing note reviewed.   HENT:      Head: Normocephalic and atraumatic.      Right Ear: Tympanic membrane normal.      Left Ear: Tympanic membrane normal.      Mouth/Throat:      Mouth: Mucous membranes are moist.      Pharynx: Oropharynx is clear. No oropharyngeal exudate or posterior oropharyngeal erythema.   Eyes:      Conjunctiva/sclera: Conjunctivae normal.   Cardiovascular:      Rate and Rhythm: Normal rate and regular rhythm.      Heart sounds: Normal heart sounds.   Pulmonary:      Effort: Pulmonary effort is normal.      Breath sounds: Normal breath sounds.   Musculoskeletal:      Cervical back: Neck supple.   Lymphadenopathy:      Cervical: No cervical adenopathy.   Skin:     General: Skin is warm.   Neurological:      Mental Status: She is alert.

## 2025-03-06 ENCOUNTER — RESULTS FOLLOW-UP (OUTPATIENT)
Dept: URGENT CARE | Facility: CLINIC | Age: 40
End: 2025-03-06

## 2025-03-06 LAB — BACTERIA THROAT CULT: NORMAL

## 2025-03-10 ENCOUNTER — TELEPHONE (OUTPATIENT)
Dept: BARIATRICS | Facility: CLINIC | Age: 40
End: 2025-03-10

## 2025-03-13 ENCOUNTER — HOSPITAL ENCOUNTER (EMERGENCY)
Facility: HOSPITAL | Age: 40
Discharge: HOME | End: 2025-03-13
Attending: EMERGENCY MEDICINE
Payer: COMMERCIAL

## 2025-03-13 VITALS
OXYGEN SATURATION: 96 % | BODY MASS INDEX: 52.48 KG/M2 | DIASTOLIC BLOOD PRESSURE: 85 MMHG | HEART RATE: 57 BPM | RESPIRATION RATE: 16 BRPM | WEIGHT: 293 LBS | SYSTOLIC BLOOD PRESSURE: 135 MMHG | TEMPERATURE: 98.3 F

## 2025-03-13 DIAGNOSIS — R51.9 HEADACHE: Primary | ICD-10-CM

## 2025-03-13 LAB
ANION GAP SERPL CALCULATED.3IONS-SCNC: 8 MMOL/L (ref 4–13)
BASOPHILS # BLD AUTO: 0.1 THOUSANDS/ÂΜL (ref 0–0.1)
BASOPHILS NFR BLD AUTO: 1 % (ref 0–1)
BUN SERPL-MCNC: 24 MG/DL (ref 5–25)
CALCIUM SERPL-MCNC: 9 MG/DL (ref 8.4–10.2)
CHLORIDE SERPL-SCNC: 111 MMOL/L (ref 96–108)
CO2 SERPL-SCNC: 20 MMOL/L (ref 21–32)
CREAT SERPL-MCNC: 0.73 MG/DL (ref 0.6–1.3)
CRP SERPL QL: 6.1 MG/L
EOSINOPHIL # BLD AUTO: 0.27 THOUSAND/ÂΜL (ref 0–0.61)
EOSINOPHIL NFR BLD AUTO: 2 % (ref 0–6)
ERYTHROCYTE [DISTWIDTH] IN BLOOD BY AUTOMATED COUNT: 13.5 % (ref 11.6–15.1)
ERYTHROCYTE [SEDIMENTATION RATE] IN BLOOD: 7 MM/HOUR (ref 0–19)
GFR SERPL CREATININE-BSD FRML MDRD: 103 ML/MIN/1.73SQ M
GLUCOSE SERPL-MCNC: 103 MG/DL (ref 65–140)
HCT VFR BLD AUTO: 43.5 % (ref 34.8–46.1)
HGB BLD-MCNC: 13.9 G/DL (ref 11.5–15.4)
IMM GRANULOCYTES # BLD AUTO: 0.04 THOUSAND/UL (ref 0–0.2)
IMM GRANULOCYTES NFR BLD AUTO: 0 % (ref 0–2)
LYMPHOCYTES # BLD AUTO: 3.12 THOUSANDS/ÂΜL (ref 0.6–4.47)
LYMPHOCYTES NFR BLD AUTO: 27 % (ref 14–44)
MCH RBC QN AUTO: 28.5 PG (ref 26.8–34.3)
MCHC RBC AUTO-ENTMCNC: 32 G/DL (ref 31.4–37.4)
MCV RBC AUTO: 89 FL (ref 82–98)
MONOCYTES # BLD AUTO: 0.8 THOUSAND/ÂΜL (ref 0.17–1.22)
MONOCYTES NFR BLD AUTO: 7 % (ref 4–12)
NEUTROPHILS # BLD AUTO: 7.14 THOUSANDS/ÂΜL (ref 1.85–7.62)
NEUTS SEG NFR BLD AUTO: 63 % (ref 43–75)
NRBC BLD AUTO-RTO: 0 /100 WBCS
PLATELET # BLD AUTO: 298 THOUSANDS/UL (ref 149–390)
PMV BLD AUTO: 9.8 FL (ref 8.9–12.7)
POTASSIUM SERPL-SCNC: 3.6 MMOL/L (ref 3.5–5.3)
RBC # BLD AUTO: 4.87 MILLION/UL (ref 3.81–5.12)
SODIUM SERPL-SCNC: 139 MMOL/L (ref 135–147)
WBC # BLD AUTO: 11.47 THOUSAND/UL (ref 4.31–10.16)

## 2025-03-13 PROCEDURE — 36415 COLL VENOUS BLD VENIPUNCTURE: CPT | Performed by: EMERGENCY MEDICINE

## 2025-03-13 PROCEDURE — 99284 EMERGENCY DEPT VISIT MOD MDM: CPT | Performed by: EMERGENCY MEDICINE

## 2025-03-13 PROCEDURE — 96365 THER/PROPH/DIAG IV INF INIT: CPT

## 2025-03-13 PROCEDURE — 99284 EMERGENCY DEPT VISIT MOD MDM: CPT

## 2025-03-13 PROCEDURE — 86140 C-REACTIVE PROTEIN: CPT | Performed by: EMERGENCY MEDICINE

## 2025-03-13 PROCEDURE — 96375 TX/PRO/DX INJ NEW DRUG ADDON: CPT

## 2025-03-13 PROCEDURE — 80048 BASIC METABOLIC PNL TOTAL CA: CPT | Performed by: EMERGENCY MEDICINE

## 2025-03-13 PROCEDURE — 85025 COMPLETE CBC W/AUTO DIFF WBC: CPT | Performed by: EMERGENCY MEDICINE

## 2025-03-13 PROCEDURE — 85652 RBC SED RATE AUTOMATED: CPT | Performed by: EMERGENCY MEDICINE

## 2025-03-13 RX ORDER — DEXAMETHASONE SODIUM PHOSPHATE 10 MG/ML
10 INJECTION, SOLUTION INTRAMUSCULAR; INTRAVENOUS ONCE
Status: COMPLETED | OUTPATIENT
Start: 2025-03-13 | End: 2025-03-13

## 2025-03-13 RX ORDER — KETOROLAC TROMETHAMINE 30 MG/ML
30 INJECTION, SOLUTION INTRAMUSCULAR; INTRAVENOUS ONCE
Status: COMPLETED | OUTPATIENT
Start: 2025-03-13 | End: 2025-03-13

## 2025-03-13 RX ORDER — MAGNESIUM SULFATE HEPTAHYDRATE 40 MG/ML
2 INJECTION, SOLUTION INTRAVENOUS ONCE
Status: COMPLETED | OUTPATIENT
Start: 2025-03-13 | End: 2025-03-13

## 2025-03-13 RX ORDER — PREDNISONE 10 MG/1
TABLET ORAL
Qty: 30 TABLET | Refills: 0 | Status: SHIPPED | OUTPATIENT
Start: 2025-03-13 | End: 2025-03-25

## 2025-03-13 RX ORDER — DIPHENHYDRAMINE HYDROCHLORIDE 50 MG/ML
25 INJECTION, SOLUTION INTRAMUSCULAR; INTRAVENOUS ONCE
Status: COMPLETED | OUTPATIENT
Start: 2025-03-13 | End: 2025-03-13

## 2025-03-13 RX ORDER — METOCLOPRAMIDE HYDROCHLORIDE 5 MG/ML
5 INJECTION INTRAMUSCULAR; INTRAVENOUS ONCE
Status: COMPLETED | OUTPATIENT
Start: 2025-03-13 | End: 2025-03-13

## 2025-03-13 RX ADMIN — DEXAMETHASONE SODIUM PHOSPHATE 10 MG: 10 INJECTION, SOLUTION INTRAMUSCULAR; INTRAVENOUS at 19:30

## 2025-03-13 RX ADMIN — DIPHENHYDRAMINE HYDROCHLORIDE 25 MG: 50 INJECTION, SOLUTION INTRAMUSCULAR; INTRAVENOUS at 19:28

## 2025-03-13 RX ADMIN — SODIUM CHLORIDE 1000 ML: 0.9 INJECTION, SOLUTION INTRAVENOUS at 19:35

## 2025-03-13 RX ADMIN — MAGNESIUM SULFATE HEPTAHYDRATE 2 G: 40 INJECTION, SOLUTION INTRAVENOUS at 19:35

## 2025-03-13 RX ADMIN — KETOROLAC TROMETHAMINE 30 MG: 30 INJECTION, SOLUTION INTRAMUSCULAR at 19:31

## 2025-03-13 RX ADMIN — METOCLOPRAMIDE 5 MG: 5 INJECTION, SOLUTION INTRAMUSCULAR; INTRAVENOUS at 19:29

## 2025-03-13 NOTE — ED PROVIDER NOTES
Time reflects when diagnosis was documented in both MDM as applicable and the Disposition within this note       Time User Action Codes Description Comment    3/13/2025  9:59 PM Piyush Butcher Add [R51.9] Headache           ED Disposition       ED Disposition   Discharge    Condition   Stable    Date/Time   Thu Mar 13, 2025  9:59 PM    Comment   Gita UNIQUE Rendon discharge to home/self care.                   Assessment & Plan       Medical Decision Making  39-year-old female presented the ER for evaluation of headache.  History of chronic headache disorder.  This headache has been going on for about a week but slightly worse last several days.  Has been seen numerous times in the past for headaches somewhat similar to that.  No red flag features such as head trauma, neurologic deficits.  No thunderclap/sudden onset worst headache of life.  On multiple medications that outpatient which not significantly helping with symptoms.  Differential diagnose includes cervicogenic headache, tension type headache, migraine headache, headache due to increased ICP.  No clinical/exam evidence of increased ICP.  Patient reports previous spinal taps that demonstrated decreasing lumbar opening pressures since starting treatment for IIH years ago.  Lower suspicion that this is a contributing factor in her current headache.  Doubt SAH.  Do not think head CT is needed due to chronic headaches and similar features to those.  Discussed supportive care she wants to try steroids which helped her in the past the expense of some anxiety symptoms.  Counseled on risk benefits we will proceed with steroid management on reassessment feeling somewhat improved we will proceed with outpatient management steroid taper prescribed.  Outpatient follow-up instructed return to ER precautions discussed.    Problems Addressed:  Headache: chronic illness or injury with exacerbation, progression, or side effects of treatment    Amount and/or Complexity  of Data Reviewed  Labs: ordered.    Risk  Prescription drug management.        ED Course as of 03/13/25 2241   u Mar 13, 2025   3788 Patient notes some improvement in headache on reassessment. She is resting comfortably in room. She is agreeable to plan for discharge and outpatient mgmt.        Medications   dexamethasone (PF) (DECADRON) injection 10 mg (10 mg Intravenous Given 3/13/25 1930)   metoclopramide (REGLAN) injection 5 mg (5 mg Intravenous Given 3/13/25 1929)   diphenhydrAMINE (BENADRYL) injection 25 mg (25 mg Intravenous Given 3/13/25 1928)   magnesium sulfate 2 g/50 mL IVPB (premix) 2 g (0 g Intravenous Stopped 3/13/25 2035)   sodium chloride 0.9 % bolus 1,000 mL (0 mL Intravenous Stopped 3/13/25 2035)   ketorolac (TORADOL) injection 30 mg (30 mg Intravenous Given 3/13/25 1931)       ED Risk Strat Scores                                                History of Present Illness       Chief Complaint   Patient presents with    Headache     Started 1 week ago with headache which became worse 2 days ago. Had an infusion for her headache today which made her feel worse(vyepti)       Past Medical History:   Diagnosis Date    Allergic     Allergic rhinitis     Anorexia nervosa in remission     Anxiety     Arthritis 5/8/2014    Back pain     Bipolar disorder (HCC)     Depression     Dermatitis 10/16/2021    Disease of thyroid gland     Diverticulitis of colon 9/20/2015    Dizziness     Ear problems     Eating disorder     Esophageal reflux 08/15/2013    GERD (gastroesophageal reflux disease) 8/15/2013    Headache(784.0)     Headache, tension-type     Hypertension     Hypothyroid     Idiopathic intracranial hypertension     Lumbar degenerative disc disease 05/08/2014    Migraine     Nasal congestion     Nosebleed     Obesity     Obsessive-compulsive disorder     Otitis media     Overactive bladder     Psychiatric disorder     Psychiatric illness     Restless leg syndrome, controlled     Seasonal allergies      Sinusitis     Sleep apnea     Sleep difficulties     Suicide and self-inflicted injury (HCC)     Tinnitus     TMJ dysfunction     Tonsillitis     Transcranial Magnetic Stimulation 09/06/2021    Urinary tract infection     Vision loss       Past Surgical History:   Procedure Laterality Date    DENTAL SURGERY      wisdom teeth-at [age] years. Other surgery dental extraxtion of bone spur (10 years ago)    IR LUMBAR PUNCTURE  02/26/2019    SINUS ENDOSCOPY      SINUS SURGERY      TONSILLECTOMY        Family History   Problem Relation Age of Onset    Mental illness Mother     Depression Mother     Suicide Attempts Mother     Hypertension Mother     Diabetes Mother     Other Mother         bicuspid aortic valve    Anxiety disorder Mother     Psychiatric Illness Mother     Schizoaffective Disorder  Mother     Anemia Mother     Hypertension Father     Hypothyroidism Father     Thyroid disease Father     Hypertension Brother     Cancer Maternal Grandmother     Heart disease Maternal Grandmother     Stroke Maternal Grandmother     Breast cancer Maternal Grandmother     Skin cancer Maternal Grandmother     Arthritis Maternal Grandmother     Pneumonia Maternal Grandfather     Cancer Maternal Grandfather     Dementia Maternal Grandfather     COPD Maternal Grandfather     Cancer Paternal Grandmother     Pneumonia Paternal Grandfather     Arthritis Family     Breast cancer Family     Osteopenia Family     Osteoporosis Family     Hypertension Family     Transient ischemic attack Family       Social History     Tobacco Use    Smoking status: Never     Passive exposure: Never    Smokeless tobacco: Never   Vaping Use    Vaping status: Never Used   Substance Use Topics    Alcohol use: Never    Drug use: Never      E-Cigarette/Vaping    E-Cigarette Use Never User       E-Cigarette/Vaping Substances    Nicotine No     THC No     CBD No     Flavoring No     Other No     Unknown No       I have reviewed and agree with the history as  documented.     Is a 39-year-old female past medical history significant for chronic headaches, carries diagnosis of cervicogenic headaches, migraines, IIH follows with neurology is on multiple medications outpatient she presents here for acute on chronic headache states about 1 week of worsening headache symptoms which worsened significantly in the past 2 days or so.  Denies any inciting trauma, illnesses, reports headache is primarily in the frontal region does endorse photophobia.  Denies any extremity pain numbness tingling weakness nausea vomiting syncope.  Denies fevers or chills.  States that she has not responded significantly to medications in the ER except for steroids but she tends to avoid these due to anxiety symptoms but is willing to try them now.      Headache  Associated symptoms: fatigue and photophobia    Associated symptoms: no abdominal pain, no back pain, no cough, no dizziness, no ear pain, no eye pain, no fever, no myalgias, no neck pain, no neck stiffness, no numbness, no seizures, no sore throat, no vomiting and no weakness        Review of Systems   Constitutional:  Positive for fatigue. Negative for chills and fever.   HENT:  Negative for ear pain and sore throat.    Eyes:  Positive for photophobia. Negative for pain and visual disturbance.   Respiratory:  Negative for cough and shortness of breath.    Cardiovascular:  Negative for chest pain and palpitations.   Gastrointestinal:  Negative for abdominal pain and vomiting.   Genitourinary:  Negative for dysuria and hematuria.   Musculoskeletal:  Negative for arthralgias, back pain, myalgias, neck pain and neck stiffness.   Skin:  Negative for color change and rash.   Neurological:  Positive for headaches. Negative for dizziness, seizures, syncope, facial asymmetry, speech difficulty, weakness, light-headedness and numbness.   All other systems reviewed and are negative.          Objective       ED Triage Vitals   Temperature Pulse Blood  Pressure Respirations SpO2 Patient Position - Orthostatic VS   03/13/25 1723 03/13/25 1723 03/13/25 1723 03/13/25 1723 03/13/25 1723 03/13/25 1723   98.3 °F (36.8 °C) 77 141/65 18 98 % Sitting      Temp Source Heart Rate Source BP Location FiO2 (%) Pain Score    03/13/25 1723 03/13/25 1900 03/13/25 1723 -- 03/13/25 1723    Temporal Monitor Left arm  8      Vitals      Date and Time Temp Pulse SpO2 Resp BP Pain Score FACES Pain Rating User   03/13/25 2200 -- 57 96 % 16 135/85 -- --    03/13/25 1931 -- -- -- -- -- 7 --    03/13/25 1900 -- 62 96 % 16 -- -- --    03/13/25 1723 98.3 °F (36.8 °C) 77 98 % 18 141/65 8 -- KTR            Physical Exam  Vitals and nursing note reviewed.   Constitutional:       General: She is not in acute distress.     Appearance: Normal appearance. She is obese. She is not ill-appearing, toxic-appearing or diaphoretic.   HENT:      Head: Normocephalic and atraumatic.      Right Ear: External ear normal.      Left Ear: External ear normal.      Nose: Nose normal.      Mouth/Throat:      Mouth: Mucous membranes are moist.      Pharynx: Oropharynx is clear.   Eyes:      General: No visual field deficit.     Extraocular Movements: Extraocular movements intact.      Conjunctiva/sclera: Conjunctivae normal.      Pupils: Pupils are equal, round, and reactive to light.   Cardiovascular:      Rate and Rhythm: Normal rate.   Pulmonary:      Effort: Pulmonary effort is normal. No respiratory distress.   Abdominal:      General: There is no distension.   Musculoskeletal:      Cervical back: Neck supple. No rigidity or tenderness.   Skin:     General: Skin is warm and dry.      Coloration: Skin is not pale.   Neurological:      General: No focal deficit present.      Mental Status: She is alert.      Cranial Nerves: Cranial nerves 2-12 are intact. No cranial nerve deficit, dysarthria or facial asymmetry.      Sensory: Sensation is intact.      Motor: Motor function is intact.      Gait: Gait is  intact.         Results Reviewed       Procedure Component Value Units Date/Time    Basic metabolic panel [[id number]]  (Abnormal) Collected: 03/13/25 1849    Lab Status: Final result Specimen: Blood from Arm, Right Updated: 03/13/25 1929     Sodium 139 mmol/L      Potassium 3.6 mmol/L      Chloride 111 mmol/L      CO2 20 mmol/L      ANION GAP 8 mmol/L      BUN 24 mg/dL      Creatinine 0.73 mg/dL      Glucose 103 mg/dL      Calcium 9.0 mg/dL      eGFR 103 ml/min/1.73sq m     Narrative:      National [employer] guidelines for Chronic Kidney Disease (CKD):     Stage 1 with normal or high GFR (GFR > 90 mL/min/1.73 square meters)    Stage 2 Mild CKD (GFR = 60-89 mL/min/1.73 square meters)    Stage 3A Moderate CKD (GFR = 45-59 mL/min/1.73 square meters)    Stage 3B Moderate CKD (GFR = 30-44 mL/min/1.73 square meters)    Stage 4 Severe CKD (GFR = 15-29 mL/min/1.73 square meters)    Stage 5 End Stage CKD (GFR <15 mL/min/1.73 square meters)  Note: GFR calculation is accurate only with a steady state creatinine    C-reactive protein [[id number]]  (Abnormal) Collected: 03/13/25 1849    Lab Status: Final result Specimen: Blood from Arm, Right Updated: 03/13/25 1929     CRP 6.1 mg/L     Sedimentation rate, automated [[id number]]  (Normal) Collected: 03/13/25 1849    Lab Status: Final result Specimen: Blood from Arm, Right Updated: 03/13/25 1916     Sed Rate 7 mm/hour     CBC and differential [[id number]]  (Abnormal) Collected: 03/13/25 1849    Lab Status: Final result Specimen: Blood from Arm, Right Updated: 03/13/25 1856     WBC 11.47 Thousand/uL      RBC 4.87 Million/uL      Hemoglobin 13.9 g/dL      Hematocrit 43.5 %      MCV 89 fL      MCH 28.5 pg      MCHC 32.0 g/dL      RDW 13.5 %      MPV 9.8 fL      Platelets 298 Thousands/uL      nRBC 0 /100 WBCs      Segmented % 63 %      Immature Grans % 0 %      Lymphocytes % 27 %      Monocytes % 7 %      Eosinophils Relative 2 %      Basophils Relative 1 %      Absolute  Neutrophils 7.14 Thousands/µL      Absolute Immature Grans 0.04 Thousand/uL      Absolute Lymphocytes 3.12 Thousands/µL      Absolute Monocytes 0.80 Thousand/µL      Eosinophils Absolute 0.27 Thousand/µL      Basophils Absolute 0.10 Thousands/µL             No orders to display       Procedures    ED Medication and Procedure Management   Prior to Admission Medications   Prescriptions Last Dose Informant Patient Reported? Taking?   Cequa 0.09 % SOLN  Self No No   Sig: Apply 1 drop to eye 2 (two) times a day   DULoxetine (CYMBALTA) 60 mg delayed release capsule  Self No No   Sig: Take 2 capsules (120 mg total) by mouth daily   Linzess 290 MCG CAPS  Self No No   Sig: take 1 capsule by mouth once daily   Mirabegron ER (Myrbetriq) 50 MG TB24  Self No No   Sig: take 1 tablet by mouth every morning   Multiple Vitamin (MULTIVITAMIN) capsule  Self Yes No   Sig: Take 1 capsule by mouth daily   Nurtec 75 MG TBDP  Self Yes No   Sig: Take 75 mg by mouth every other day   Vibegron 75 MG TABS   No No   Sig: Take 75 mg by mouth in the morning   Vraylar 3 MG capsule   Yes No   Sig: take 1 capsule by mouth BEFORE BEDTIME   Patient not taking: Reported on 3/4/2025   acetaZOLAMIDE (DIAMOX) 250 mg tablet  Self No No   Sig: Take 2 tablets (500 mg total) by mouth 2 (two) times a day   candesartan (ATACAND) 8 MG tablet   Yes No   Sig: Take 8 mg by mouth daily   cariprazine (VRAYLAR) 3 MG capsule  Self Yes No   Sig: Take 6 mg by mouth daily at bedtime   cariprazine (Vraylar) 6 MG capsule  Self No No   Sig: Take 1 capsule (6 mg total) by mouth daily   celecoxib (CeleBREX) 200 mg capsule  Self No No   Sig: Take 1 capsule (200 mg total) by mouth 2 (two) times a day   cetirizine (ZyrTEC) 10 MG chewable tablet  Self Yes No   Sig: Chew 10 mg daily   docusate sodium (COLACE) 100 mg capsule  Self Yes No   Sig: Take 100 mg by mouth 2 (two) times a day   fluvoxaMINE (LUVOX) 25 MG tablet   Yes No   Sig: Take 1 tablet by mouth in the morning    gabapentin (NEURONTIN) 300 mg capsule  Self No No   Sig: Take 1 capsule (300 mg total) by mouth daily after breakfast   gabapentin (NEURONTIN) 600 MG tablet  Self No No   Sig: Take 1.5 tablets (900 mg total) by mouth daily at bedtime   levothyroxine 112 mcg tablet   No No   Sig: Take 1 tablet (112 mcg total) by mouth daily in the early morning   linaCLOtide (Linzess) 290 MCG CAPS  Self No No   Sig: Take 1 capsule by mouth daily   metFORMIN (GLUCOPHAGE) 500 mg tablet   No No   Sig: Take 1 tablet (500 mg total) by mouth daily with breakfast   naratriptan (AMERGE) 2.5 MG tablet  Self Yes No   Sig: take 1 tablet by mouth AT ONSET OF MIGRAINE may repeat in 2 hours...  (REFER TO PRESCRIPTION NOTES).   omeprazole (PriLOSEC) 20 mg delayed release capsule  Self No No   Sig: Take 1 capsule (20 mg total) by mouth 2 (two) times a day   tirzepatide (Zepbound) 10 mg/0.5 mL auto-injector   No No   Sig: Inject 0.5 mL (10 mg total) under the skin once a week   topiramate (TOPAMAX) 100 mg tablet  Self Yes No   Sig: Take 150 mg by mouth 2 (two) times a day TAKE 1.5 TABLETS BID      Facility-Administered Medications: None     Discharge Medication List as of 3/13/2025 10:01 PM        START taking these medications    Details   predniSONE 10 mg tablet Multiple Dosages:Starting Thu 3/13/2025, Until Sat 3/15/2025 at 2359, THEN Starting Sun 3/16/2025, Until Tue 3/18/2025 at 2359, THEN Starting Wed 3/19/2025, Until Fri 3/21/2025 at 2359, THEN Starting Sat 3/22/2025, Until Mon 3/24/2025 at 2359Take 4  tablets (40 mg total) by mouth daily for 3 days, THEN 3 tablets (30 mg total) daily for 3 days, THEN 2 tablets (20 mg total) daily for 3 days, THEN 1 tablet (10 mg total) daily for 3 days., Normal           CONTINUE these medications which have NOT CHANGED    Details   acetaZOLAMIDE (DIAMOX) 250 mg tablet Take 2 tablets (500 mg total) by mouth 2 (two) times a day, Starting Mon 11/4/2024, No Print      candesartan (ATACAND) 8 MG tablet Take 8  mg by mouth daily, Starting Mon 3/3/2025, Until Tue 3/3/2026, Historical Med      !! cariprazine (VRAYLAR) 3 MG capsule Take 6 mg by mouth daily at bedtime, Historical Med      celecoxib (CeleBREX) 200 mg capsule Take 1 capsule (200 mg total) by mouth 2 (two) times a day, Starting Tue 4/16/2024, Normal      Cequa 0.09 % SOLN Apply 1 drop to eye 2 (two) times a day, Starting Sat 8/5/2023, Normal      cetirizine (ZyrTEC) 10 MG chewable tablet Chew 10 mg daily, Historical Med      docusate sodium (COLACE) 100 mg capsule Take 100 mg by mouth 2 (two) times a day, Historical Med      DULoxetine (CYMBALTA) 60 mg delayed release capsule Take 2 capsules (120 mg total) by mouth daily, Starting Tue 11/5/2024, Until Thu 2/13/2025, Normal      fluvoxaMINE (LUVOX) 25 MG tablet Take 1 tablet by mouth in the morning, Starting Mon 2/17/2025, Historical Med      gabapentin (NEURONTIN) 300 mg capsule Take 1 capsule (300 mg total) by mouth daily after breakfast, Starting Fri 7/12/2024, No Print      gabapentin (NEURONTIN) 600 MG tablet Take 1.5 tablets (900 mg total) by mouth daily at bedtime, Starting Tue 4/16/2024, Normal      levothyroxine 112 mcg tablet Take 1 tablet (112 mcg total) by mouth daily in the early morning, Starting Fri 2/28/2025, Normal      !! linaCLOtide (Linzess) 290 MCG CAPS Take 1 capsule by mouth daily, Starting Tue 12/31/2024, Until Sun 6/29/2025, Normal      !! Linzess 290 MCG CAPS take 1 capsule by mouth once daily, Starting Thu 1/2/2025, Normal      metFORMIN (GLUCOPHAGE) 500 mg tablet Take 1 tablet (500 mg total) by mouth daily with breakfast, Starting Mon 1/27/2025, Normal      Mirabegron ER (Myrbetriq) 50 MG TB24 take 1 tablet by mouth every morning, Starting Mon 12/16/2024, Normal      Multiple Vitamin (MULTIVITAMIN) capsule Take 1 capsule by mouth daily, Historical Med      naratriptan (AMERGE) 2.5 MG tablet take 1 tablet by mouth AT ONSET OF MIGRAINE may repeat in 2 hours...  (REFER TO PRESCRIPTION  NOTES)., Historical Med      Nurtec 75 MG TBDP Take 75 mg by mouth every other day, Starting Tue 9/3/2024, Historical Med      omeprazole (PriLOSEC) 20 mg delayed release capsule Take 1 capsule (20 mg total) by mouth 2 (two) times a day, Starting Mon 1/6/2025, Normal      tirzepatide (Zepbound) 10 mg/0.5 mL auto-injector Inject 0.5 mL (10 mg total) under the skin once a week, Starting Tue 3/4/2025, Normal      topiramate (TOPAMAX) 100 mg tablet Take 150 mg by mouth 2 (two) times a day TAKE 1.5 TABLETS BID, Starting Sat 6/1/2024, Historical Med      Vibegron 75 MG TABS Take 75 mg by mouth in the morning, Starting Thu 2/13/2025, Normal      !! Vraylar 3 MG capsule take 1 capsule by mouth BEFORE BEDTIME, Historical Med       !! - Potential duplicate medications found. Please discuss with provider.        No discharge procedures on file.  ED SEPSIS DOCUMENTATION   Time reflects when diagnosis was documented in both MDM as applicable and the Disposition within this note       Time User Action Codes Description Comment    3/13/2025  9:59 PM Piyush Butcher Add [R51.9] Headache                  Piyush Butcher DO  03/13/25 7911

## 2025-03-19 ENCOUNTER — NURSE TRIAGE (OUTPATIENT)
Age: 40
End: 2025-03-19

## 2025-03-19 NOTE — TELEPHONE ENCOUNTER
"FOLLOW UP: Patient requests PCP recommendation    REASON FOR CONVERSATION: Headache    SYMPTOMS: see Answer Assessment    OTHER: Patient has reached out to Neurology but feels her condition is not improving with their suggestions. Patient reports Neurologist has adjusted candesartan dosage with goal of improving symptoms, no relief yet.     She states prednisone used to help but did not help the last time she took it.    DISPOSITION: See Today or Tomorrow in Office  Patient declines office visit tomorrow, as patient's PCP is not in office. She requests PCP recommendation regarding symptoms.       Reason for Disposition   MODERATE headache (e.g., interferes with normal activities) present > 24 hours and unexplained    Answer Assessment - Initial Assessment Questions  1. LOCATION: \"Where does it hurt?\"       Front of head, face    2. ONSET: \"When did the headache start?\" (e.g., minutes, hours, days)       Chronic issue, worse over past 1-2 weeks    3. PATTERN: \"Does the pain come and go, or has it been constant since it started?\"      Constant    4. SEVERITY: \"How bad is the pain?\" and \"What does it keep you from doing?\"  (e.g., Scale 1-10; mild, moderate, or severe)      6-7/10    5. RECURRENT SYMPTOM: \"Have you ever had headaches before?\" If Yes, ask: \"When was the last time?\" and \"What happened that time?\"       Yes, followed by Neurology at [facility]    6. CAUSE: \"What do you think is causing the headache?\"      Migraine    7. MIGRAINE: \"Have you been diagnosed with migraine headaches?\" If Yes, ask: \"Is this headache similar?\"       Yes    8. HEAD INJURY: \"Has there been any recent injury to the head?\"       N/A    9. OTHER SYMPTOMS: \"Do you have any other symptoms?\" (e.g., fever, stiff neck, eye pain, sore throat, cold symptoms)      Constant runny nose, eye sensitivity, fatigue    Protocols used: Headache-Adult-[place]    "

## 2025-03-27 ENCOUNTER — RA CDI HCC (OUTPATIENT)
Dept: OTHER | Facility: HOSPITAL | Age: 40
End: 2025-03-27

## 2025-03-27 PROBLEM — E66.813 CLASS 3 SEVERE OBESITY DUE TO EXCESS CALORIES WITH BODY MASS INDEX (BMI) OF 50.0 TO 59.9 IN ADULT (HCC): Status: ACTIVE | Noted: 2025-03-27

## 2025-03-27 PROBLEM — E66.01 CLASS 3 SEVERE OBESITY DUE TO EXCESS CALORIES WITH BODY MASS INDEX (BMI) OF 50.0 TO 59.9 IN ADULT (HCC): Status: ACTIVE | Noted: 2025-03-27

## 2025-03-28 ENCOUNTER — OFFICE VISIT (OUTPATIENT)
Dept: FAMILY MEDICINE CLINIC | Facility: CLINIC | Age: 40
End: 2025-03-28
Payer: COMMERCIAL

## 2025-03-28 VITALS
DIASTOLIC BLOOD PRESSURE: 78 MMHG | BODY MASS INDEX: 45.99 KG/M2 | TEMPERATURE: 35.6 F | WEIGHT: 293 LBS | HEIGHT: 67 IN | SYSTOLIC BLOOD PRESSURE: 116 MMHG | OXYGEN SATURATION: 98 % | HEART RATE: 73 BPM

## 2025-03-28 DIAGNOSIS — G93.2 IDIOPATHIC INTRACRANIAL HYPERTENSION: ICD-10-CM

## 2025-03-28 DIAGNOSIS — G43.819 OTHER MIGRAINE WITHOUT STATUS MIGRAINOSUS, INTRACTABLE: Primary | ICD-10-CM

## 2025-03-28 PROCEDURE — G2211 COMPLEX E/M VISIT ADD ON: HCPCS | Performed by: FAMILY MEDICINE

## 2025-03-28 PROCEDURE — 99214 OFFICE O/P EST MOD 30 MIN: CPT | Performed by: FAMILY MEDICINE

## 2025-03-28 NOTE — ASSESSMENT & PLAN NOTE
Continue to follow current medication regimen per [facility] team (Current Preventives:Topamax, gabapentin, cymbalta, nurtec, diamox, Vyepti , candesartan; Current Acute Medications:Maxalt, Axert, Nurtec)  Gave pt information on alternative therapies such as a head/neck massage therapy as it is unclear whether her pain is d/t IIH/migraines/tension HA  Inpt admission pending with [facility] team  Schedule follow up for primary care after inpatient stay at [facility]

## 2025-03-28 NOTE — PROGRESS NOTES
Name: Gita Rendon      : 1985      MRN: [id number]  Encounter Provider: Norma Traore MD  Encounter Date: 3/28/2025   Encounter department: [facility] PRIMARY CARE  :  Assessment & Plan  Other migraine without status migrainosus, intractable  Continue to follow current medication regimen per [facility] team (Current Preventives:Topamax, gabapentin, cymbalta, nurtec, diamox, Vyepti , candesartan; Current Acute Medications:Maxalt, Axert, Nurtec)  Gave pt information on alternative therapies such as a head/neck massage therapy as it is unclear whether her pain is d/t IIH/migraines/tension HA  Inpt admission pending with [facility] team  Schedule follow up for primary care after inpatient stay at [facility]         Idiopathic intracranial hypertension  Continue with acetazolamide 500mg po BID             Depression Screening and Follow-up Plan: Patient's depression screening was positive with a PHQ-9 score of 11.         History of Present Illness   Ms. Rendon is a 40 y/o F with significant PMHx IIH, chronic migraines, hypothyroidism, anxiety, depression who presents to the clinic for a 6 mo f/u for IIH/chronic migraines.   Pt states that she continues to have nearly daily headaches lasting for most of the day. She describes the HA as frontal and typically 5/10 pain with worst pain being 8/10. Has associated photophobia and fatigue. Denies numbness, tingling, neurologic deficits. Had recent ED visit for acute pain and was given prednisone taper with no relief. States that recently her HA have been at baseline and she has had moderate relief with rizatriptan.   Pt follows with [facility] - regimen of:   Current Preventives:  Topamax, gabapentin, cymbalta, nurtec, diamox, Vyepti , candesartan   Current Acute Medications:  Maxalt, Axert, Nurtec  Pt notes a recent dosage change of candesartan from 16mg to 8mg due to side effect of fatigue.   Pt got a call from  "[facility] this morning that she will be admitted to the HA floor in attempt to find a more favorable abortive medication regimen and will be treated with lidocaine. Will be a week-long stay and she is currently on a 6-8 wk waitlist.   States anxiety and depression have been under much better control since last appt.        Review of Systems   Constitutional:  Positive for fatigue. Negative for activity change and appetite change.   HENT:  Negative for congestion, postnasal drip, sinus pressure, sinus pain and sore throat.    Eyes:  Negative for pain, discharge and itching.   Respiratory:  Negative for cough, chest tightness and shortness of breath.    Cardiovascular:  Negative for chest pain, palpitations and leg swelling.   Gastrointestinal:  Negative for abdominal distention and abdominal pain.   Genitourinary:  Negative for difficulty urinating.   Musculoskeletal:  Negative for back pain, neck pain and neck stiffness.   Neurological:  Positive for headaches. Negative for syncope, weakness and numbness.   Psychiatric/Behavioral:  Negative for confusion and dysphoric mood.        Objective   /78 (BP Location: Left arm, Patient Position: Sitting, Cuff Size: Extra-Large)   Pulse 73   Temp (!) 35.6 °F (2 °C) (Tympanic)   Ht 5' 7\" (1.702 m)   Wt (!) 153 kg (338 lb)   LMP 02/21/2025   SpO2 98%   BMI 52.94 kg/m²      Physical Exam  Constitutional:       Appearance: Normal appearance. She is obese.   HENT:      Head: Normocephalic and atraumatic.   Eyes:      Extraocular Movements: Extraocular movements intact.      Pupils: Pupils are equal, round, and reactive to light.   Cardiovascular:      Rate and Rhythm: Normal rate and regular rhythm.      Pulses: Normal pulses.      Heart sounds: Normal heart sounds. No murmur heard.     No friction rub. No gallop.   Pulmonary:      Effort: Pulmonary effort is normal. No respiratory distress.      Breath sounds: Normal breath sounds. No stridor. No wheezing, rhonchi " or rales.   Abdominal:      General: Abdomen is flat. Bowel sounds are normal.      Palpations: Abdomen is soft.      Tenderness: There is no abdominal tenderness.   Musculoskeletal:         General: No swelling, tenderness or deformity.      Cervical back: Normal range of motion. No rigidity.   Neurological:      General: No focal deficit present.      Mental Status: She is alert and oriented to person, place, and time. Mental status is at baseline.   Psychiatric:         Mood and Affect: Mood normal.         Behavior: Behavior normal.         Thought Content: Thought content normal.         Judgment: Judgment normal.

## 2025-04-03 DIAGNOSIS — E66.01 MORBID OBESITY (HCC): Primary | ICD-10-CM

## 2025-04-03 RX ORDER — TIRZEPATIDE 12.5 MG/.5ML
12.5 INJECTION, SOLUTION SUBCUTANEOUS WEEKLY
Qty: 2 ML | Refills: 0 | Status: SHIPPED | OUTPATIENT
Start: 2025-04-03 | End: 2025-05-01

## 2025-04-04 ENCOUNTER — NURSE TRIAGE (OUTPATIENT)
Dept: OTHER | Facility: OTHER | Age: 40
End: 2025-04-04

## 2025-04-04 NOTE — TELEPHONE ENCOUNTER
"Regarding: Headache with Extreme Fatigue, Nausious, Face is Red, Cold  ----- Message from Erin CHAVEZ sent at 4/4/2025  7:36 PM EDT -----  \" I have a History of Headaches, and IIH, I have a Headache with extreme fatigue today, I don't feel well at all, my face is read, I am cold and nauseous.\"    "

## 2025-04-05 NOTE — TELEPHONE ENCOUNTER
"FOLLOW UP: None    REASON FOR CONVERSATION: Headache    SYMPTOMS: ESC sent to on call provider-  patient reports she has been struggling with a migraine and extreme fatigue since early in the week. She notes the pain goes from the back of her neck up the base of her head. Also reports nausea and sensitivity to smells. Notes none of her usual medications are helping. Did state that in the past she was on steroids for a respiratory infection and that seemed to help the headaches     Per on call-  Patient should be evaluated in the ED    OTHER: N/A    DISPOSITION: See [facility] Within 4 Hours (Or PCP Triage)      Reason for Disposition   [1] SEVERE headache (e.g., excruciating) AND [2] not improved after 2 hours of pain medicine    Answer Assessment - Initial Assessment Questions  1. LOCATION: \"Where does it hurt?\"       Back of her neck and radiates up the base of her head    2. ONSET: \"When did the headache start?\" (e.g., minutes, hours, days)       About a week ago    3. PATTERN: \"Does the pain come and go, or has it been constant since it started?\"      Constant    4. SEVERITY: \"How bad is the pain?\" and \"What does it keep you from doing?\"  (e.g., Scale 1-10; mild, moderate, or severe)      Severe    5. RECURRENT SYMPTOM: \"Have you ever had headaches before?\" If Yes, ask: \"When was the last time?\" and \"What happened that time?\"       Has a hx of migraines    6. CAUSE: \"What do you think is causing the headache?\"      Unsure    9. OTHER SYMPTOMS: \"Do you have any other symptoms?\" (e.g., fever, stiff neck, eye pain, sore throat, cold symptoms)      Nausea and sensitivity to smells    Protocols used: Headache-Adult-    "

## 2025-04-07 ENCOUNTER — OFFICE VISIT (OUTPATIENT)
Dept: OBGYN CLINIC | Facility: CLINIC | Age: 40
End: 2025-04-07
Payer: COMMERCIAL

## 2025-04-07 VITALS
SYSTOLIC BLOOD PRESSURE: 140 MMHG | DIASTOLIC BLOOD PRESSURE: 76 MMHG | HEIGHT: 67 IN | BODY MASS INDEX: 45.99 KG/M2 | WEIGHT: 293 LBS

## 2025-04-07 DIAGNOSIS — Z71.85 HPV VACCINE COUNSELING: ICD-10-CM

## 2025-04-07 DIAGNOSIS — Z12.31 ENCOUNTER FOR SCREENING MAMMOGRAM FOR BREAST CANCER: ICD-10-CM

## 2025-04-07 DIAGNOSIS — Z01.419 ENCNTR FOR GYN EXAM (GENERAL) (ROUTINE) W/O ABN FINDINGS: Primary | ICD-10-CM

## 2025-04-07 PROCEDURE — G0101 CA SCREEN;PELVIC/BREAST EXAM: HCPCS | Performed by: NURSE PRACTITIONER

## 2025-04-07 NOTE — PROGRESS NOTES
Assessment/Plan:  Calcium 1000 mg (in divided doses-max 600 mg at one time) + 600-1000 IU Vit D daily.   Pap with high risk HPV Q 5 years, if normal. Due   HPV 9 vaccine recommended through age 45. Check with your insurance for coverage. If covered, call office to schedule start of vaccine series.   Annual mammogram is ordered.    Monthly breast self exam encouraged.  Call your insurance company to verify coverage prior to completing any ordered tests.   Birth control declined. If desired, return to office to discuss.     Exercise 150-300 minutes per week minimum.   Colon cancer screening-due   Kegels 20 times twice daily. Use silicone based lubricant with sex as needed. (Water based only for use with condoms or sexual toys.)  Return to office in one year or sooner, if needed.        1. Encntr for gyn exam (general) (routine) w/o abn findings  2. Encounter for screening mammogram for breast cancer  -     Mammo screening bilateral w 3d and cad; Future; Expected date: 2025  3. HPV vaccine counseling  4. Screening for cervical cancer             Subjective:      Patient ID: Gita Rendon is a 39 y.o. female.    HPI    Gita Rendon is a 39 y.o.  female who is here today for her annual visit. No gynecologic health concerns.   Medically complex but stable.   Headache since age 15. Plans on being admitted to [facility] headache unit x 1 week on May 12th for evaluation and management.    Irregular menses  Q 1-3 months x 3 days with light flow. Menses is acceptable.  Recently switched from wegovy to zepbound. Was please with weight loss on wegovy but had almost complete appetite suppression.   Exercise- not regularly   Not currently working.   Admits to significant life stress since I saw her last in 2018. Dad  from covid. Mom is now in personal care/skilled nursing for last 3 years. Her step dad also . She [facility] into her [facility] apartment and lived alone for the first time in her life.  Admitted to a deep depression and hospitalized 3 times last year but is  now stable. Now living in a different apartment with a 26 yo female roommate.     Gita Rendon is sexually active with male partner of 2 months.  Monogamous and feels safe in this relationship. Denies vaginal pain,bleeding or dryness.    She uses condoms for contraception.    She is not interested in STD screening today.   She denies vaginal discharge, itching or pelvic pain.   She has urinary urgency and  does have urinary incontinence in the middle of the night. Already has referral to PF PT. Does struggle to wake fully and takes a diuretic HS.  No bowel concerns.  No breast concerns.     Last pap:   2/26/18 normal pap with negative HR HPV   05/11/2022 normal with negative HR HPV   Mammogram: 12/06/2018 diagnostic  normal with LTC 24.46%  Colonoscopy: 10/15/2021 recall 10 years  DEXA scan: Not on file  HPV vaccine series: No    Family history of cancer:   Cancer-related family history includes Breast cancer in her family and maternal grandmother; Cancer in her maternal grandfather, maternal grandmother, and paternal grandmother; Skin cancer in her maternal grandmother.      The following portions of the patient's history were reviewed and updated as appropriate: allergies, current medications, past family history, past medical history, past social history, past surgical history, and problem list.    Review of Systems   Constitutional: Negative.  Negative for activity change, appetite change, chills, diaphoresis, fatigue, fever and unexpected weight change.   HENT:  Negative for congestion, dental problem, sneezing, sore throat and trouble swallowing.    Eyes:  Negative for visual disturbance.   Respiratory:  Negative for chest tightness and shortness of breath.    Cardiovascular:  Negative for chest pain and leg swelling.   Gastrointestinal:  Negative for abdominal pain, constipation, diarrhea, nausea and vomiting.   Genitourinary:   "Positive for menstrual problem and urgency (takes diruetic). Negative for difficulty urinating, dyspareunia, dysuria, frequency, hematuria, pelvic pain, vaginal bleeding, vaginal discharge and vaginal pain.   Musculoskeletal:  Negative for back pain and neck pain.   Skin: Negative.    Allergic/Immunologic: Negative.    Neurological:  Positive for headaches. Negative for weakness.   Hematological:  Negative for adenopathy.   Psychiatric/Behavioral: Negative.  Negative for self-injury and suicidal ideas.          Objective:      /76 (BP Location: Left arm, Patient Position: Sitting, Cuff Size: Large)   Ht 5' 6.5\" (1.689 m)   Wt (!) 156 kg (344 lb 12.8 oz)   LMP 02/21/2025 (Approximate)   BMI 54.82 kg/m²          Physical Exam  Vitals and nursing note reviewed.   Constitutional:       Appearance: Normal appearance. She is well-developed.      Comments: BMI 54   HENT:      Head: Normocephalic and atraumatic.   Eyes:      General:         Right eye: No discharge.         Left eye: No discharge.   Neck:      Thyroid: No thyromegaly.      Trachea: Trachea normal.   Cardiovascular:      Rate and Rhythm: Normal rate and regular rhythm.      Heart sounds: Normal heart sounds.   Pulmonary:      Effort: Pulmonary effort is normal.      Breath sounds: Normal breath sounds.   Chest:   Breasts:     Breasts are symmetrical.      Right: Normal. No inverted nipple, mass, nipple discharge, skin change or tenderness.      Left: Normal. No inverted nipple, mass, nipple discharge, skin change or tenderness.   Abdominal:      Palpations: Abdomen is soft.   Genitourinary:     General: Normal vulva.      Exam position: Lithotomy position.      Labia:         Right: No rash, tenderness, lesion or injury.         Left: No rash, tenderness, lesion or injury.       Urethra: No prolapse, urethral pain, urethral swelling or urethral lesion.      Vagina: Normal. No signs of injury and foreign body. No vaginal discharge, erythema, " tenderness or bleeding.      Cervix: Normal.      Uterus: Normal.       Adnexa:         Right: No mass, tenderness or fullness.          Left: No mass, tenderness or fullness.        Rectum: No external hemorrhoid.      Comments: Physical exam limited by habitus  Musculoskeletal:         General: Normal range of motion.      Cervical back: Normal range of motion and neck supple.   Lymphadenopathy:      Head:      Right side of head: No submental, submandibular or tonsillar adenopathy.      Left side of head: No submental, submandibular or tonsillar adenopathy.      Cervical: No cervical adenopathy.      Upper Body:      Right upper body: No supraclavicular or axillary adenopathy.      Left upper body: No supraclavicular or axillary adenopathy.      Lower Body: No right inguinal adenopathy. No left inguinal adenopathy.   Skin:     General: Skin is warm and dry.   Neurological:      Mental Status: She is alert and oriented to person, place, and time.   Psychiatric:         Mood and Affect: Mood normal.         Behavior: Behavior normal.

## 2025-04-07 NOTE — PATIENT INSTRUCTIONS
Calcium 1000 mg (in divided doses-max 600 mg at one time) + 600-1000 IU Vit D daily.   Pap with high risk HPV Q 5 years, if normal. Due 2027  HPV 9 vaccine recommended through age 45. Check with your insurance for coverage. If covered, call office to schedule start of vaccine series.   Annual mammogram is ordered.    Monthly breast self exam encouraged.  Call your insurance company to verify coverage prior to completing any ordered tests.   Birth control declined. If desired, return to office to discuss.     Exercise 150-300 minutes per week minimum.   Colon cancer screening-due 2031  Kegels 20 times twice daily. Use silicone based lubricant with sex as needed. (Water based only for use with condoms or sexual toys.)  Return to office in one year or sooner, if needed.

## 2025-04-11 ENCOUNTER — NURSE TRIAGE (OUTPATIENT)
Age: 40
End: 2025-04-11

## 2025-04-11 ENCOUNTER — HOSPITAL ENCOUNTER (EMERGENCY)
Facility: HOSPITAL | Age: 40
Discharge: HOME | End: 2025-04-11
Attending: EMERGENCY MEDICINE
Payer: COMMERCIAL

## 2025-04-11 VITALS
HEART RATE: 56 BPM | TEMPERATURE: 97.8 F | RESPIRATION RATE: 21 BRPM | DIASTOLIC BLOOD PRESSURE: 75 MMHG | SYSTOLIC BLOOD PRESSURE: 143 MMHG | OXYGEN SATURATION: 100 %

## 2025-04-11 DIAGNOSIS — K21.9 GASTROESOPHAGEAL REFLUX DISEASE WITHOUT ESOPHAGITIS: ICD-10-CM

## 2025-04-11 DIAGNOSIS — R51.9 CHRONIC HEADACHES: Primary | ICD-10-CM

## 2025-04-11 DIAGNOSIS — R53.83 FATIGUE: ICD-10-CM

## 2025-04-11 DIAGNOSIS — G89.29 CHRONIC HEADACHES: Primary | ICD-10-CM

## 2025-04-11 LAB
ALBUMIN SERPL BCG-MCNC: 4.1 G/DL (ref 3.5–5)
ALP SERPL-CCNC: 75 U/L (ref 34–104)
ALT SERPL W P-5'-P-CCNC: 13 U/L (ref 7–52)
ANION GAP SERPL CALCULATED.3IONS-SCNC: 6 MMOL/L (ref 4–13)
AST SERPL W P-5'-P-CCNC: 12 U/L (ref 13–39)
BASOPHILS # BLD AUTO: 0.08 THOUSANDS/ÂΜL (ref 0–0.1)
BASOPHILS NFR BLD AUTO: 1 % (ref 0–1)
BILIRUB SERPL-MCNC: 0.27 MG/DL (ref 0.2–1)
BUN SERPL-MCNC: 22 MG/DL (ref 5–25)
CALCIUM SERPL-MCNC: 9 MG/DL (ref 8.4–10.2)
CHLORIDE SERPL-SCNC: 111 MMOL/L (ref 96–108)
CO2 SERPL-SCNC: 25 MMOL/L (ref 21–32)
CREAT SERPL-MCNC: 0.83 MG/DL (ref 0.6–1.3)
EOSINOPHIL # BLD AUTO: 0.43 THOUSAND/ÂΜL (ref 0–0.61)
EOSINOPHIL NFR BLD AUTO: 4 % (ref 0–6)
ERYTHROCYTE [DISTWIDTH] IN BLOOD BY AUTOMATED COUNT: 13.7 % (ref 11.6–15.1)
EXT PREGNANCY TEST URINE: NEGATIVE
EXT. CONTROL: NORMAL
GFR SERPL CREATININE-BSD FRML MDRD: 88 ML/MIN/1.73SQ M
GLUCOSE SERPL-MCNC: 89 MG/DL (ref 65–140)
HCT VFR BLD AUTO: 45.2 % (ref 34.8–46.1)
HGB BLD-MCNC: 14 G/DL (ref 11.5–15.4)
IMM GRANULOCYTES # BLD AUTO: 0.03 THOUSAND/UL (ref 0–0.2)
IMM GRANULOCYTES NFR BLD AUTO: 0 % (ref 0–2)
LYMPHOCYTES # BLD AUTO: 2.94 THOUSANDS/ÂΜL (ref 0.6–4.47)
LYMPHOCYTES NFR BLD AUTO: 26 % (ref 14–44)
MCH RBC QN AUTO: 28.1 PG (ref 26.8–34.3)
MCHC RBC AUTO-ENTMCNC: 31 G/DL (ref 31.4–37.4)
MCV RBC AUTO: 91 FL (ref 82–98)
MONOCYTES # BLD AUTO: 0.72 THOUSAND/ÂΜL (ref 0.17–1.22)
MONOCYTES NFR BLD AUTO: 6 % (ref 4–12)
NEUTROPHILS # BLD AUTO: 7.12 THOUSANDS/ÂΜL (ref 1.85–7.62)
NEUTS SEG NFR BLD AUTO: 63 % (ref 43–75)
NRBC BLD AUTO-RTO: 0 /100 WBCS
PLATELET # BLD AUTO: 297 THOUSANDS/UL (ref 149–390)
PMV BLD AUTO: 9.9 FL (ref 8.9–12.7)
POTASSIUM SERPL-SCNC: 3.6 MMOL/L (ref 3.5–5.3)
PROT SERPL-MCNC: 6.6 G/DL (ref 6.4–8.4)
RBC # BLD AUTO: 4.98 MILLION/UL (ref 3.81–5.12)
SODIUM SERPL-SCNC: 142 MMOL/L (ref 135–147)
TSH SERPL DL<=0.05 MIU/L-ACNC: 2.17 UIU/ML (ref 0.45–4.5)
WBC # BLD AUTO: 11.32 THOUSAND/UL (ref 4.31–10.16)

## 2025-04-11 PROCEDURE — 96375 TX/PRO/DX INJ NEW DRUG ADDON: CPT

## 2025-04-11 PROCEDURE — 96365 THER/PROPH/DIAG IV INF INIT: CPT

## 2025-04-11 PROCEDURE — 93005 ELECTROCARDIOGRAM TRACING: CPT

## 2025-04-11 PROCEDURE — 99284 EMERGENCY DEPT VISIT MOD MDM: CPT | Performed by: EMERGENCY MEDICINE

## 2025-04-11 PROCEDURE — 84443 ASSAY THYROID STIM HORMONE: CPT | Performed by: EMERGENCY MEDICINE

## 2025-04-11 PROCEDURE — 36415 COLL VENOUS BLD VENIPUNCTURE: CPT | Performed by: EMERGENCY MEDICINE

## 2025-04-11 PROCEDURE — 81025 URINE PREGNANCY TEST: CPT | Performed by: EMERGENCY MEDICINE

## 2025-04-11 PROCEDURE — 85025 COMPLETE CBC W/AUTO DIFF WBC: CPT | Performed by: EMERGENCY MEDICINE

## 2025-04-11 PROCEDURE — 99284 EMERGENCY DEPT VISIT MOD MDM: CPT

## 2025-04-11 PROCEDURE — 80053 COMPREHEN METABOLIC PANEL: CPT | Performed by: EMERGENCY MEDICINE

## 2025-04-11 RX ORDER — OMEPRAZOLE 20 MG/1
20 CAPSULE, DELAYED RELEASE ORAL 2 TIMES DAILY
Qty: 180 CAPSULE | Refills: 1 | Status: SHIPPED | OUTPATIENT
Start: 2025-04-11

## 2025-04-11 RX ORDER — DIPHENHYDRAMINE HYDROCHLORIDE 50 MG/ML
25 INJECTION, SOLUTION INTRAMUSCULAR; INTRAVENOUS ONCE
Status: COMPLETED | OUTPATIENT
Start: 2025-04-11 | End: 2025-04-11

## 2025-04-11 RX ORDER — KETOROLAC TROMETHAMINE 30 MG/ML
15 INJECTION, SOLUTION INTRAMUSCULAR; INTRAVENOUS ONCE
Status: COMPLETED | OUTPATIENT
Start: 2025-04-11 | End: 2025-04-11

## 2025-04-11 RX ORDER — METOCLOPRAMIDE HYDROCHLORIDE 5 MG/ML
10 INJECTION INTRAMUSCULAR; INTRAVENOUS ONCE
Status: COMPLETED | OUTPATIENT
Start: 2025-04-11 | End: 2025-04-11

## 2025-04-11 RX ORDER — DEXAMETHASONE SODIUM PHOSPHATE 10 MG/ML
10 INJECTION, SOLUTION INTRAMUSCULAR; INTRAVENOUS ONCE
Status: COMPLETED | OUTPATIENT
Start: 2025-04-11 | End: 2025-04-11

## 2025-04-11 RX ORDER — ACETAMINOPHEN 325 MG/1
975 TABLET ORAL ONCE
Status: COMPLETED | OUTPATIENT
Start: 2025-04-11 | End: 2025-04-11

## 2025-04-11 RX ORDER — MAGNESIUM SULFATE HEPTAHYDRATE 40 MG/ML
2 INJECTION, SOLUTION INTRAVENOUS ONCE
Status: COMPLETED | OUTPATIENT
Start: 2025-04-11 | End: 2025-04-11

## 2025-04-11 RX ADMIN — DIPHENHYDRAMINE HYDROCHLORIDE 25 MG: 50 INJECTION, SOLUTION INTRAMUSCULAR; INTRAVENOUS at 19:39

## 2025-04-11 RX ADMIN — KETOROLAC TROMETHAMINE 15 MG: 30 INJECTION, SOLUTION INTRAMUSCULAR at 19:39

## 2025-04-11 RX ADMIN — SODIUM CHLORIDE 1000 ML: 0.9 INJECTION, SOLUTION INTRAVENOUS at 19:38

## 2025-04-11 RX ADMIN — METOCLOPRAMIDE 10 MG: 5 INJECTION, SOLUTION INTRAMUSCULAR; INTRAVENOUS at 19:40

## 2025-04-11 RX ADMIN — MAGNESIUM SULFATE HEPTAHYDRATE 2 G: 40 INJECTION, SOLUTION INTRAVENOUS at 19:38

## 2025-04-11 RX ADMIN — ACETAMINOPHEN 975 MG: 325 TABLET, FILM COATED ORAL at 19:48

## 2025-04-11 RX ADMIN — DEXAMETHASONE SODIUM PHOSPHATE 10 MG: 10 INJECTION, SOLUTION INTRAMUSCULAR; INTRAVENOUS at 19:39

## 2025-04-11 NOTE — TELEPHONE ENCOUNTER
"FOLLOW UP: n/a     REASON FOR CONVERSATION: Headache, Dizziness, and Nausea    SYMPTOMS: current moderate headache of 5/10. Its a constant headache but the severity of it varies. Patient states she feels in the back of her head and wraps up to the temples and around eyes. Patient state having lightheadedness, nausea, faces is red, productive cough, runny nose. Denies having a fever, chest pain, shortness of breath.      OTHER: patient states last Friday 4/4/25 she called w/ similar symptoms and was advise to be evaluated in the ED. Patient states that she feel asleep afterwards waiting for her boyfriend and didn't go. States that she has been having headaches for months and is schedule for the headache unit at [facility] for May 12th.  States that this feels like its flaring up. She tends to have a good day but then the cycle starts and feels like its getting worse. Patient didn't call all week to follow up or ask for further advise by provider and she is still not getting any relieve. Patient was advise to be evaluated in the ED. She was asked if someone would be able to take her as its not advisable for her to drive the way she is feeling and stated he boyfriend would be coming in 30 minutes and would take her to [facility].     DISPOSITION: Go to ED (overriding See PCP Within 24 Hours)        Reason for Disposition   [1] MODERATE headache (e.g., interferes with normal activities) AND [2] present > 24 hours AND [3] unexplained  (Exceptions: Pain medicines not tried, typical migraine, or headache part of viral illness.)    Answer Assessment - Initial Assessment Questions  1. LOCATION: \"Where does it hurt?\"       The back of the neck and side of the forehead the temples and around the eyes    2. ONSET: \"When did the headache start?\" (e.g., minutes, hours, days)       On and off for months but feels like it has flared up. Patient call last Friday and was advise by after hour triage nurse to get evaluated in the " "ED     3. PATTERN: \"Does the pain come and go, or has it been constant since it started?\"      Constant and severity comes and goes     4. SEVERITY: \"How bad is the pain?\" and \"What does it keep you from doing?\"  (e.g., Scale 1-10; mild, moderate, or severe)      Currently 5/10     5. RECURRENT SYMPTOM: \"Have you ever had headaches before?\" If Yes, ask: \"When was the last time?\" and \"What happened that time?\"       Yes patient states she has been having this problem for months and has history of migraines and IIH     6. CAUSE: \"What do you think is causing the headache?\"      Patient states she has been dealing with this for months      7. MIGRAINE: \"Have you been diagnosed with migraine headaches?\" If Yes, ask: \"Is this headache similar?\"       Yes and IIH    8. HEAD INJURY: \"Has there been any recent injury to the head?\"       Denies     9. OTHER SYMPTOMS: \"Do you have any other symptoms?\" (e.g., fever, stiff neck, eye pain, sore throat, cold symptoms)      Lightheaded, nausea, congestion , productive cough, runny nose    Protocols used: Headache-Adult-AH    "

## 2025-04-11 NOTE — ED PROVIDER NOTES
Time reflects when diagnosis was documented in both MDM as applicable and the Disposition within this note       Time User Action Codes Description Comment    4/11/2025  8:57 PM Dalia Singh Add [R51.9,  G89.29] Chronic headaches     4/11/2025  8:57 PM Dalia Singh Add [R53.83] Fatigue           ED Disposition       ED Disposition   Discharge    Condition   Stable    Date/Time   Fri Apr 11, 2025  8:57 PM    Comment   Gita Rendon discharge to home/self care.                   Assessment & Plan       Medical Decision Making  Amount and/or Complexity of Data Reviewed  Labs: ordered.    Risk  OTC drugs.  Prescription drug management.      39-year-old female with history of IIH on Diamox, history of daily headaches presenting for evaluation of a headache, patient has been having waxing waning headaches in the past few years but now with increasing fatigue secondary to her headaches, otherwise her headaches feel similar to prior, she has a normal neurologic exam, she does follow with [facility], she is planned to be admitted in a month to their headache clinic.  Do not believe patient needs neuroimaging at this time, will treat symptomatically with IV medications to try to help with headache.  I believe her fatigue is likely related to her ongoing headaches, but will check labs to evaluate for anemia, CMP to evaluate for metabolic abnormality, TSH to evaluate thyroid dysfunction.  Will obtain EKG.  Reviewed labs, no marked abnormalities.  Reviewed and interpreted EKG, shows normal sinus rhythm without acute ischemic changes.  Reassessed patient, she states her headache is slightly improved.  She was comfortable with plan for discharge.  Will have patient follow-up with her neurologist as previously scheduled.  Discussed with patient strict return precautions.  Patient expressed understanding was agreeable for discharge.       Medications   sodium chloride 0.9 % bolus 1,000 mL (0 mL Intravenous Stopped  4/11/25 2105)   ketorolac (TORADOL) injection 15 mg (15 mg Intravenous Given 4/11/25 1939)   dexamethasone (PF) (DECADRON) injection 10 mg (10 mg Intravenous Given 4/11/25 1939)   metoclopramide (REGLAN) injection 10 mg (10 mg Intravenous Given 4/11/25 1940)   diphenhydrAMINE (BENADRYL) injection 25 mg (25 mg Intravenous Given 4/11/25 1939)   magnesium sulfate 2 g/50 mL IVPB (premix) 2 g (0 g Intravenous Stopped 4/11/25 2038)   acetaminophen (TYLENOL) tablet 975 mg (975 mg Oral Given 4/11/25 1948)       ED Risk Strat Scores                    No data recorded        SBIRT 20yo+      Flowsheet Row Most Recent Value   Initial Alcohol Screen: US AUDIT-C     1. How often do you have a drink containing alcohol? 0 Filed at: 04/11/2025 1856   2. How many drinks containing alcohol do you have on a typical day you are drinking?  0 Filed at: 04/11/2025 1856   3a. Male UNDER 65: How often do you have five or more drinks on one occasion? 0 Filed at: 04/11/2025 1856   3b. FEMALE Any Age, or MALE 65+: How often do you have 4 or more drinks on one occassion? 0 Filed at: 04/11/2025 1856   Audit-C Score 0 Filed at: 04/11/2025 1856   JENNIFER: How many times in the past year have you...    Used an illegal drug or used a prescription medication for non-medical reasons? Never Filed at: 04/11/2025 1856                            History of Present Illness       Chief Complaint   Patient presents with    Migraine     Pt states that she started with ongoing headaches for a long time and has been feeling more tired. Pt is getting admitted to [facility] in may to there headache unit for this.        Past Medical History:   Diagnosis Date    ADHD (attention deficit hyperactivity disorder)     Allergic     Allergic rhinitis     Anorexia nervosa in remission     Anxiety     Arthritis 5/8/2014    Back pain     Bipolar disorder (HCC)     Chronic diarrhea     Chronic pain disorder     Depression     Dermatitis 10/16/2021    Disease of thyroid gland      Diverticulitis of colon 9/20/2015    Dizziness     Ear problems     Eating disorder     Environmental and seasonal allergies     Esophageal reflux 08/15/2013    GERD (gastroesophageal reflux disease) 8/15/2013    Headache(784.0)     Headache, tension-type     Hypertension     Hypothyroid     Idiopathic intracranial hypertension     Impulse control disorder     Lumbar degenerative disc disease 05/08/2014    Memory loss     Migraine     Nasal congestion     Nosebleed     Obesity     Obsessive-compulsive disorder     Osteoarthritis 5/8/2014    Otitis media     Overactive bladder     Panic attack     Psychiatric disorder     Psychiatric illness     PTSD (post-traumatic stress disorder)     Restless leg syndrome, controlled     Seasonal allergies     Self-injurious behavior     Sinus problem     Sinusitis     Sleep apnea     Sleep difficulties     Suicide and self-inflicted injury (HCC)     Suicide attempt (HCC)     Tinnitus     TMJ dysfunction     Tonsillitis     Transcranial Magnetic Stimulation 09/06/2021    Urinary incontinence     Urinary tract infection     Vision loss       Past Surgical History:   Procedure Laterality Date    COLONOSCOPY  10/5/2015    DENTAL SURGERY      wisdom teeth-at [age] years. Other surgery dental extraxtion of bone spur (10 years ago)    IR LUMBAR PUNCTURE  02/26/2019    SINUS ENDOSCOPY      SINUS SURGERY      TONSILLECTOMY        Family History   Problem Relation Age of Onset    Mental illness Mother     Depression Mother     Suicide Attempts Mother     Hypertension Mother     Diabetes Mother     Anxiety disorder Mother     Psychiatric Illness Mother     Schizoaffective Disorder  Mother     Anemia Mother     Colon polyps Mother     Transient ischemic attack Mother     Snoring Mother     Hypertension Father     Hypothyroidism Father     Thyroid disease Father     Sudden death Father     Psoriasis Father     Heart disease Father     Hypertension Brother     Cancer Maternal Grandmother      Heart disease Maternal Grandmother     Stroke Maternal Grandmother     Breast cancer Maternal Grandmother     Skin cancer Maternal Grandmother     Arthritis Maternal Grandmother     Pneumonia Maternal Grandfather     Cancer Maternal Grandfather     Dementia Maternal Grandfather     COPD Maternal Grandfather     Hearing loss Maternal Grandfather     Cancer Paternal Grandmother     Pneumonia Paternal Grandfather     Arthritis Family     Breast cancer Family     Osteopenia Family     Osteoporosis Family     Hypertension Family     Transient ischemic attack Family       Social History     Tobacco Use    Smoking status: Never     Passive exposure: Never    Smokeless tobacco: Never   Vaping Use    Vaping status: Never Used   Substance Use Topics    Alcohol use: Never    Drug use: Never      E-Cigarette/Vaping    E-Cigarette Use Never User       E-Cigarette/Vaping Substances    Nicotine No     THC No     CBD No     Flavoring No     Other No     Unknown No       I have reviewed and agree with the history as documented.     HPI    39-year-old female with history of IIH on Diamox, history of daily headaches, hypertension presenting for evaluation of a headache.  Patient states she has been having ongoing headaches for the past few years.  She states they wax and wane in intensity.  She has been seen here 2 times over the past few months for similar symptoms.  She states nothing really seems to help with the headaches.  She otherwise denies fevers.  Denies neck stiffness.  She states she gets occasional blurred vision from time to time.  Denies any double vision, no numbness or weakness in her arms or legs, dysphagia, or dysarthria.  She states she has been feeling more generally fatigued recently secondary to the headaches.  She did follow-up with neuro at [facility] who are planning to admit her and made to their headache clinic.    Review of Systems   Constitutional:  Positive for fatigue. Negative for appetite change,  chills and fever.   HENT:  Negative for congestion, rhinorrhea and sore throat.    Eyes:  Positive for photophobia.   Respiratory:  Negative for cough and shortness of breath.    Cardiovascular:  Negative for chest pain.   Gastrointestinal:  Positive for nausea. Negative for abdominal pain, diarrhea and vomiting.   Genitourinary:  Negative for dysuria, frequency, hematuria and urgency.   Musculoskeletal:  Negative for arthralgias and myalgias.   Skin:  Negative for rash.   Neurological:  Positive for weakness and headaches. Negative for dizziness, light-headedness and numbness.   All other systems reviewed and are negative.          Objective       ED Triage Vitals [04/11/25 1854]   Temperature Pulse Blood Pressure Respirations SpO2 Patient Position - Orthostatic VS   97.8 °F (36.6 °C) 66 134/74 18 100 % Lying      Temp Source Heart Rate Source BP Location FiO2 (%) Pain Score    Temporal Monitor Right arm -- 6      Vitals      Date and Time Temp Pulse SpO2 Resp BP Pain Score FACES Pain Rating User   04/11/25 2100 -- 56 100 % 21 143/75 -- -- AK   04/11/25 2023 -- 57 100 % 17 136/79 -- -- AK   04/11/25 2000 -- -- -- -- -- 4 -- AK   04/11/25 1948 -- -- -- -- -- 5 -- AP   04/11/25 1939 -- -- -- -- -- 5 -- AK   04/11/25 1854 97.8 °F (36.6 °C) 66 100 % 18 134/74 6 -- AB            Physical Exam  Vitals and nursing note reviewed.   Constitutional:       General: She is not in acute distress.     Appearance: Normal appearance. She is well-developed. She is obese. She is not ill-appearing, toxic-appearing or diaphoretic.   HENT:      Head: Normocephalic and atraumatic.      Right Ear: External ear normal.      Left Ear: External ear normal.      Nose: Nose normal.      Mouth/Throat:      Mouth: Mucous membranes are moist.      Pharynx: Oropharynx is clear.   Eyes:      Extraocular Movements: Extraocular movements intact.      Conjunctiva/sclera: Conjunctivae normal.   Cardiovascular:      Rate and Rhythm: Normal rate and  regular rhythm.      Pulses: Normal pulses.      Heart sounds: Normal heart sounds. No murmur heard.     No friction rub. No gallop.   Pulmonary:      Effort: Pulmonary effort is normal. No respiratory distress.      Breath sounds: Normal breath sounds. No wheezing or rales.   Abdominal:      General: There is no distension.      Palpations: Abdomen is soft.      Tenderness: There is no abdominal tenderness. There is no guarding or rebound.   Musculoskeletal:         General: No tenderness.      Cervical back: Neck supple.      Right lower leg: No edema.      Left lower leg: No edema.   Skin:     General: Skin is warm and dry.      Coloration: Skin is not pale.      Findings: No erythema or rash.   Neurological:      General: No focal deficit present.      Mental Status: She is alert and oriented to person, place, and time.      Cranial Nerves: No cranial nerve deficit.      Sensory: No sensory deficit.      Motor: No weakness.   Psychiatric:         Mood and Affect: Mood normal.         Behavior: Behavior normal.         Results Reviewed       Procedure Component Value Units Date/Time    TSH, 3rd generation with Free T4 reflex [[id number]]  (Normal) Collected: 04/11/25 1937    Lab Status: Final result Specimen: Blood from Arm, Left Updated: 04/11/25 2019     TSH 3RD GENERATON 2.166 uIU/mL     POCT pregnancy, urine [[id number]]  (Normal) Collected: 04/11/25 2006    Lab Status: Final result Updated: 04/11/25 2006     EXT Preg Test, Ur Negative     Control Valid    Comprehensive metabolic panel [[id number]]  (Abnormal) Collected: 04/11/25 1937    Lab Status: Final result Specimen: Blood from Arm, Left Updated: 04/11/25 2004     Sodium 142 mmol/L      Potassium 3.6 mmol/L      Chloride 111 mmol/L      CO2 25 mmol/L      ANION GAP 6 mmol/L      BUN 22 mg/dL      Creatinine 0.83 mg/dL      Glucose 89 mg/dL      Calcium 9.0 mg/dL      AST 12 U/L      ALT 13 U/L      Alkaline Phosphatase 75 U/L      Total Protein 6.6 g/dL       Albumin 4.1 g/dL      Total Bilirubin 0.27 mg/dL      eGFR 88 ml/min/1.73sq m     Narrative:      National Kidney Disease Foundation guidelines for Chronic Kidney Disease (CKD):     Stage 1 with normal or high GFR (GFR > 90 mL/min/1.73 square meters)    Stage 2 Mild CKD (GFR = 60-89 mL/min/1.73 square meters)    Stage 3A Moderate CKD (GFR = 45-59 mL/min/1.73 square meters)    Stage 3B Moderate CKD (GFR = 30-44 mL/min/1.73 square meters)    Stage 4 Severe CKD (GFR = 15-29 mL/min/1.73 square meters)    Stage 5 End Stage CKD (GFR <15 mL/min/1.73 square meters)  Note: GFR calculation is accurate only with a steady state creatinine    CBC and differential [[id number]]  (Abnormal) Collected: 04/11/25 1937    Lab Status: Final result Specimen: Blood from Arm, Left Updated: 04/11/25 1949     WBC 11.32 Thousand/uL      RBC 4.98 Million/uL      Hemoglobin 14.0 g/dL      Hematocrit 45.2 %      MCV 91 fL      MCH 28.1 pg      MCHC 31.0 g/dL      RDW 13.7 %      MPV 9.9 fL      Platelets 297 Thousands/uL      nRBC 0 /100 WBCs      Segmented % 63 %      Immature Grans % 0 %      Lymphocytes % 26 %      Monocytes % 6 %      Eosinophils Relative 4 %      Basophils Relative 1 %      Absolute Neutrophils 7.12 Thousands/µL      Absolute Immature Grans 0.03 Thousand/uL      Absolute Lymphocytes 2.94 Thousands/µL      Absolute Monocytes 0.72 Thousand/µL      Eosinophils Absolute 0.43 Thousand/µL      Basophils Absolute 0.08 Thousands/µL             No orders to display       ECG 12 Lead Documentation Only    Date/Time: 4/11/2025 11:16 PM    Performed by: Dalia Singh MD  Authorized by: Dalia Singh MD    Indications / Diagnosis:  Fatigue  ECG reviewed by me, the ED Provider: yes    Patient location:  ED  Previous ECG:     Previous ECG:  Compared to current    Similarity:  No change    Comparison to cardiac monitor: Yes    Interpretation:     Interpretation: normal    Rate:     ECG rate:  56    ECG rate assessment: bradycardic     Rhythm:     Rhythm: sinus bradycardia    Ectopy:     Ectopy: none    QRS:     QRS axis:  Normal    QRS intervals:  Normal  Conduction:     Conduction: normal    ST segments:     ST segments:  Normal  T waves:     T waves: normal        ED Medication and Procedure Management   Prior to Admission Medications   Prescriptions Last Dose Informant Patient Reported? Taking?   Cequa 0.09 % SOLN  Self No No   Sig: Apply 1 drop to eye 2 (two) times a day   DULoxetine (CYMBALTA) 60 mg delayed release capsule  Self No No   Sig: Take 2 capsules (120 mg total) by mouth daily   Multiple Vitamin (MULTIVITAMIN) capsule  Self Yes No   Sig: Take 1 capsule by mouth daily   Nurtec 75 MG TBDP  Self Yes No   Sig: Take 75 mg by mouth every other day   Vibegron 75 MG TABS  Self No No   Sig: Take 75 mg by mouth in the morning   Vraylar 3 MG capsule  Self Yes No   Sig: take 1 capsule by mouth BEFORE BEDTIME   acetaZOLAMIDE (DIAMOX) 250 mg tablet  Self No No   Sig: Take 2 tablets (500 mg total) by mouth 2 (two) times a day   candesartan (ATACAND) 8 MG tablet  Self Yes No   Sig: Take 8 mg by mouth daily   cariprazine (VRAYLAR) 3 MG capsule  Self Yes No   Sig: Take 6 mg by mouth daily at bedtime   cariprazine (Vraylar) 6 MG capsule  Self No No   Sig: Take 1 capsule (6 mg total) by mouth daily   celecoxib (CeleBREX) 200 mg capsule  Self No No   Sig: Take 1 capsule (200 mg total) by mouth 2 (two) times a day   cetirizine (ZyrTEC) 10 MG chewable tablet  Self Yes No   Sig: Chew 10 mg daily   docusate sodium (COLACE) 100 mg capsule  Self Yes No   Sig: Take 100 mg by mouth 2 (two) times a day   fluvoxaMINE (LUVOX) 25 MG tablet  Self Yes No   Sig: Take 1 tablet by mouth in the morning   gabapentin (NEURONTIN) 300 mg capsule  Self No No   Sig: Take 1 capsule (300 mg total) by mouth daily after breakfast   gabapentin (NEURONTIN) 600 MG tablet  Self No No   Sig: Take 1.5 tablets (900 mg total) by mouth daily at bedtime   levothyroxine 112 mcg tablet   Self No No   Sig: Take 1 tablet (112 mcg total) by mouth daily in the early morning   linaCLOtide (Linzess) 290 MCG CAPS  Self No No   Sig: Take 1 capsule by mouth daily   metFORMIN (GLUCOPHAGE) 500 mg tablet  Self No No   Sig: Take 1 tablet (500 mg total) by mouth daily with breakfast   naratriptan (AMERGE) 2.5 MG tablet  Self Yes No   Sig: take 1 tablet by mouth AT ONSET OF MIGRAINE may repeat in 2 hours...  (REFER TO PRESCRIPTION NOTES).   tirzepatide (Zepbound) 12.5 mg/0.5 mL auto-injector   No No   Sig: Inject 0.5 mL (12.5 mg total) under the skin once a week for 28 days   topiramate (TOPAMAX) 100 mg tablet  Self Yes No   Sig: Take 150 mg by mouth 2 (two) times a day TAKE 1.5 TABLETS BID      Facility-Administered Medications: None     Discharge Medication List as of 4/11/2025  8:58 PM        CONTINUE these medications which have NOT CHANGED    Details   acetaZOLAMIDE (DIAMOX) 250 mg tablet Take 2 tablets (500 mg total) by mouth 2 (two) times a day, Starting Mon 11/4/2024, No Print      candesartan (ATACAND) 8 MG tablet Take 8 mg by mouth daily, Starting Mon 3/3/2025, Until Tue 3/3/2026, Historical Med      !! cariprazine (VRAYLAR) 3 MG capsule Take 6 mg by mouth daily at bedtime, Historical Med      celecoxib (CeleBREX) 200 mg capsule Take 1 capsule (200 mg total) by mouth 2 (two) times a day, Starting Tue 4/16/2024, Normal      Cequa 0.09 % SOLN Apply 1 drop to eye 2 (two) times a day, Starting Sat 8/5/2023, Normal      cetirizine (ZyrTEC) 10 MG chewable tablet Chew 10 mg daily, Historical Med      docusate sodium (COLACE) 100 mg capsule Take 100 mg by mouth 2 (two) times a day, Historical Med      DULoxetine (CYMBALTA) 60 mg delayed release capsule Take 2 capsules (120 mg total) by mouth daily, Starting Tue 11/5/2024, Until Mon 4/7/2025, Normal      fluvoxaMINE (LUVOX) 25 MG tablet Take 1 tablet by mouth in the morning, Starting Mon 2/17/2025, Historical Med      gabapentin (NEURONTIN) 300 mg capsule Take 1  capsule (300 mg total) by mouth daily after breakfast, Starting Fri 7/12/2024, No Print      gabapentin (NEURONTIN) 600 MG tablet Take 1.5 tablets (900 mg total) by mouth daily at bedtime, Starting Tue 4/16/2024, Normal      levothyroxine 112 mcg tablet Take 1 tablet (112 mcg total) by mouth daily in the early morning, Starting Fri 2/28/2025, Normal      linaCLOtide (Linzess) 290 MCG CAPS Take 1 capsule by mouth daily, Starting Tue 12/31/2024, Until Sun 6/29/2025, Normal      metFORMIN (GLUCOPHAGE) 500 mg tablet Take 1 tablet (500 mg total) by mouth daily with breakfast, Starting Mon 1/27/2025, Normal      Multiple Vitamin (MULTIVITAMIN) capsule Take 1 capsule by mouth daily, Historical Med      naratriptan (AMERGE) 2.5 MG tablet take 1 tablet by mouth AT ONSET OF MIGRAINE may repeat in 2 hours...  (REFER TO PRESCRIPTION NOTES)., Historical Med      Nurtec 75 MG TBDP Take 75 mg by mouth every other day, Starting Tue 9/3/2024, Historical Med      tirzepatide (Zepbound) 12.5 mg/0.5 mL auto-injector Inject 0.5 mL (12.5 mg total) under the skin once a week for 28 days, Starting Thu 4/3/2025, Until Thu 5/1/2025, Normal      topiramate (TOPAMAX) 100 mg tablet Take 150 mg by mouth 2 (two) times a day TAKE 1.5 TABLETS BID, Starting Sat 6/1/2024, Historical Med      Vibegron 75 MG TABS Take 75 mg by mouth in the morning, Starting Thu 2/13/2025, Normal      !! Vraylar 3 MG capsule take 1 capsule by mouth BEFORE BEDTIME, Historical Med      omeprazole (PriLOSEC) 20 mg delayed release capsule Take 1 capsule (20 mg total) by mouth 2 (two) times a day, Starting Mon 1/6/2025, Normal       !! - Potential duplicate medications found. Please discuss with provider.        No discharge procedures on file.  ED SEPSIS DOCUMENTATION   Time reflects when diagnosis was documented in both MDM as applicable and the Disposition within this note       Time User Action Codes Description Comment    4/11/2025  8:57 PM Dalia Singh Add [R51.9,   G89.29] Chronic headaches     4/11/2025  8:57 PM Dalia Singh Add [R53.83] Fatigue                  Dalia Singh MD  04/11/25 0319

## 2025-04-12 LAB
ATRIAL RATE: 56 BPM
P AXIS: 54 DEGREES
PR INTERVAL: 186 MS
QRS AXIS: 32 DEGREES
QRSD INTERVAL: 100 MS
QT INTERVAL: 418 MS
QTC INTERVAL: 403 MS
T WAVE AXIS: 43 DEGREES
VENTRICULAR RATE: 56 BPM

## 2025-04-12 PROCEDURE — 93010 ELECTROCARDIOGRAM REPORT: CPT | Performed by: INTERNAL MEDICINE

## 2025-04-14 RX ORDER — FLUVOXAMINE MALEATE 50 MG
50 TABLET ORAL
COMMUNITY
Start: 2025-04-11

## 2025-04-15 ENCOUNTER — TELEPHONE (OUTPATIENT)
Age: 40
End: 2025-04-15

## 2025-04-15 NOTE — TELEPHONE ENCOUNTER
"Patient has history of migraine headaches.     She went to the Emergency Department on 4/11/2025. She felt improved for about 2 days, but is feeling worse again. She states she feels \"terrible\" today, does not want to go back to Emergency Department.    She sees Neurology at [facility], is scheduled for admission to the Headache Unit on 5/12/2025. She states it is too far away to go down to [place] for an office visit whenever she has the headaches.    No openings at PCP office over the next couple days. Patient requests PCP recommendation.   "

## 2025-04-17 ENCOUNTER — OFFICE VISIT (OUTPATIENT)
Dept: GASTROENTEROLOGY | Facility: CLINIC | Age: 40
End: 2025-04-17
Payer: COMMERCIAL

## 2025-04-17 VITALS
RESPIRATION RATE: 16 BRPM | OXYGEN SATURATION: 98 % | DIASTOLIC BLOOD PRESSURE: 72 MMHG | BODY MASS INDEX: 45.99 KG/M2 | HEART RATE: 74 BPM | TEMPERATURE: 97.6 F | HEIGHT: 67 IN | WEIGHT: 293 LBS | SYSTOLIC BLOOD PRESSURE: 116 MMHG

## 2025-04-17 DIAGNOSIS — K21.9 GASTROESOPHAGEAL REFLUX DISEASE, UNSPECIFIED WHETHER ESOPHAGITIS PRESENT: ICD-10-CM

## 2025-04-17 DIAGNOSIS — K62.5 BRBPR (BRIGHT RED BLOOD PER RECTUM): ICD-10-CM

## 2025-04-17 DIAGNOSIS — K59.09 CHRONIC CONSTIPATION: Primary | ICD-10-CM

## 2025-04-17 DIAGNOSIS — Z12.11 SCREENING FOR COLON CANCER: ICD-10-CM

## 2025-04-17 PROCEDURE — 99214 OFFICE O/P EST MOD 30 MIN: CPT | Performed by: PHYSICIAN ASSISTANT

## 2025-04-17 RX ORDER — FOLIC ACID 0.8 MG
800 TABLET ORAL DAILY
COMMUNITY

## 2025-04-17 RX ORDER — LUBIPROSTONE 24 UG/1
24 CAPSULE ORAL 2 TIMES DAILY WITH MEALS
Qty: 60 CAPSULE | Refills: 5 | Status: SHIPPED | OUTPATIENT
Start: 2025-04-17

## 2025-04-17 NOTE — ASSESSMENT & PLAN NOTE
Pt with chronic constipation on linzess 290mcg and stool softeners having complaints of incomplete evacuation of stool.  Previously tried miralax and did not find effective.  On GLP-1 agonist, suspect contributing to sluggish stool output.    Recommend swap of linzess and trial of amitiza 24mcg BID now.  Reviewed dosing, SE ANUJA.  Okay to continue stool softener as pt has felt this was helpful for her.  Asked pt to update office 2 weeks after starting medication.  Encouraged high fiber diet, excellent hydration, and regular physical activity.    Orders:    lubiprostone (AMITIZA) 24 mcg capsule; Take 1 capsule (24 mcg total) by mouth 2 (two) times a day with meals

## 2025-04-17 NOTE — PROGRESS NOTES
Name: Gita Rendon      : 1985      MRN: [id number]  Encounter Provider: Nereyda Hightower PA-C  Encounter Date: 2025   Encounter department: [facility] [place]  :  Assessment & Plan  Chronic constipation  Pt with chronic constipation on linzess 290mcg and stool softeners having complaints of incomplete evacuation of stool.  Previously tried miralax and did not find effective.  On GLP-1 agonist, suspect contributing to sluggish stool output.    Recommend swap of linzess and trial of amitiza 24mcg BID now.  Reviewed dosing, SE ANUJA.  Okay to continue stool softener as pt has felt this was helpful for her.  Asked pt to update office 2 weeks after starting medication.  Encouraged high fiber diet, excellent hydration, and regular physical activity.    Orders:    lubiprostone (AMITIZA) 24 mcg capsule; Take 1 capsule (24 mcg total) by mouth 2 (two) times a day with meals    BRBPR (bright red blood per rectum)  This was historically endorsed at our initial OV in 2024, and pt declined rectal examination at that time.  Last colon in  was wnl.  It was postulated 2/2 to outlet bleeding/anorectal irritation.  Last scant amt on wipe approx 2 months ago.  We will continue to monitor at this time, though if this progresses, would recommend repeat colonoscopy.       Screening for colon cancer  Last colonoscopy in  macroscopically unremarkable.  Recommend repeat colonoscopy at age 44 for colon cancer screening, sooner if clinically indicated.       Gastroesophageal reflux disease, unspecified whether esophagitis present  Pt does have hx of heartburn, on PPI BID with adequate control of symptoms.  No alarm features to the UGI tract that would warrant EGD at this time.  Will discuss tapering dosing at f/u OV if possible.    Recommend diet and lifestyle modifications for GERD.  This includes avoiding spicy, saucy, greasy/oily foods, citrus, EtOH, NSAIDs, tobacco.  Avoid eating  within 2 to 3 hours of bed.  Elevating height of bed 6 inches on blocks may be beneficial.  Weight loss would likely be beneficial.       We will follow up in 6 months to reassess symptoms.    History of Present Illness   Abdominal Pain  This is a recurrent problem. The current episode started more than 1 year ago. The onset quality is gradual. The problem occurs every several days. The most recent episode lasted 2 hours. The problem has been gradually improving. The pain is located in the left flank. The pain is at a severity of 3/10. The quality of the pain is dull. The abdominal pain does not radiate. Associated symptoms include arthralgias, belching, constipation, dysuria, frequency, headaches and myalgias. Pertinent negatives include no anorexia. The pain is relieved by Bowel movements. Prior diagnostic workup includes CT scan.     Gita Rendon is a 39 y.o. female who presents for f/u for constipation. Pmhx sig for GERD, HTN, HLD, hypothyroidism, CAIO, migraine, bipolar disorder.      History obtained from: patient    This patient was last evaluated in 06/2024.  She was on a combination of Linzess and the stool softener for her constipation. She was having some intermittent rectal bleeding and declined rectal exam at that time.     04/17/25:     Pertaining to bowels, pt is currently on linzess and stool softener. Pt is having BM daily to every other day. No straining, but feels like incompletely evacuating. No large volume BRBPR (rare on wipe after straining, once every two months) or melena. No nocturnal BM. Previously tried miralax daily, though did not find this effective. On GLP1 agonist.     No sig heartburn, indigestion, nausea, emesis, dysphagia or odynophagia. No early satiety or abnormal weight loss over past 6 months.     NSAIDs: celebrex   Etoh: none   Tobacco: none       05/2024: CT A/P: wnl   06/2024: Hb 14.0, MCV 91, Plt 312, BUN 16, Cr 0.78, AST 11, ALT 12, ALP 95, albumin 4.3, t bili 0.28,  TSH 0.428   09/2024: CT A/P: wnl  04/2025: Hb 14.0, MCV 91, Plt 297, BUN 22, Cr 0.83, AST 12, ALT 13, ALP 75, albumin 4.1, t bili 0.27, CRP 6.1      Endoscopic history:   EGD:   Colon: 10/2021: normal     Review of Systems   Gastrointestinal:  Positive for abdominal pain and constipation. Negative for anorexia.   Genitourinary:  Positive for dysuria and frequency.   Musculoskeletal:  Positive for arthralgias and myalgias.   Neurological:  Positive for headaches.   Per HPI    Past Medical History   Past Medical History:   Diagnosis Date    ADHD (attention deficit hyperactivity disorder)     Allergic     Allergic rhinitis     Anorexia nervosa in remission     Anxiety     Arthritis 5/8/2014    Back pain     Bipolar disorder (HCC)     Chronic diarrhea     Chronic pain disorder     Depression     Dermatitis 10/16/2021    Disease of thyroid gland     Diverticulitis of colon 9/20/2015    Dizziness     Ear problems     Eating disorder     Environmental and seasonal allergies     Esophageal reflux 08/15/2013    GERD (gastroesophageal reflux disease) 8/15/2013    Headache(784.0)     Headache, tension-type     Hypertension     Hypothyroid     Idiopathic intracranial hypertension     Impulse control disorder     Lumbar degenerative disc disease 05/08/2014    Memory loss     Migraine     Nasal congestion     Nosebleed     Obesity     Obsessive-compulsive disorder     Osteoarthritis 5/8/2014    Otitis media     Overactive bladder     Panic attack     Psychiatric disorder     Psychiatric illness     PTSD (post-traumatic stress disorder)     Restless leg syndrome, controlled     Seasonal allergies     Self-injurious behavior     Sinus problem     Sinusitis     Sleep apnea     Sleep difficulties     Suicide and self-inflicted injury (HCC)     Suicide attempt (HCC)     Tinnitus     TMJ dysfunction     Tonsillitis     Transcranial Magnetic Stimulation 09/06/2021    Urinary incontinence     Urinary tract infection     Vision loss       Past Surgical History:   Procedure Laterality Date    COLONOSCOPY  10/5/2015    DENTAL SURGERY      wisdom teeth-at [age] years. Other surgery dental extraxtion of bone spur (10 years ago)    IR LUMBAR PUNCTURE  02/26/2019    SINUS ENDOSCOPY      SINUS SURGERY      TONSILLECTOMY       Family History   Problem Relation Age of Onset    Mental illness Mother     Depression Mother     Suicide Attempts Mother     Hypertension Mother     Diabetes Mother     Anxiety disorder Mother     Psychiatric Illness Mother     Schizoaffective Disorder  Mother     Anemia Mother     Colon polyps Mother     Transient ischemic attack Mother     Snoring Mother     Hypertension Father     Hypothyroidism Father     Thyroid disease Father     Sudden death Father     Psoriasis Father     Heart disease Father     Hypertension Brother     Cancer Maternal Grandmother     Heart disease Maternal Grandmother     Stroke Maternal Grandmother     Breast cancer Maternal Grandmother     Skin cancer Maternal Grandmother     Arthritis Maternal Grandmother     Pneumonia Maternal Grandfather     Cancer Maternal Grandfather     Dementia Maternal Grandfather     COPD Maternal Grandfather     Hearing loss Maternal Grandfather     Cancer Paternal Grandmother     Pneumonia Paternal Grandfather     Arthritis Family     Breast cancer Family     Osteopenia Family     Osteoporosis Family     Hypertension Family     Transient ischemic attack Family       reports that she has never smoked. She has never been exposed to tobacco smoke. She has never used smokeless tobacco. She reports that she does not drink alcohol and does not use drugs.  Current Outpatient Medications   Medication Instructions    acetaZOLAMIDE (DIAMOX) 500 mg, Oral, 2 times daily    cariprazine (VRAYLAR) 6 mg, Oral, Daily    cariprazine (VRAYLAR) 3 mg, Oral, Daily at bedtime    celecoxib (CELEBREX) 200 mg, Oral, 2 times daily    Cequa 0.09 % SOLN 1 drop, Ophthalmic, 2 times daily    cetirizine  (ZYRTEC) 10 mg, Daily    docusate sodium (COLACE) 100 mg, 2 times daily    DULoxetine (CYMBALTA) 120 mg, Oral, Daily    fluvoxaMINE (LUVOX) 50 mg, Daily at bedtime    gabapentin (NEURONTIN) 900 mg, Oral, Daily at bedtime    gabapentin (NEURONTIN) 300 mg, Oral, Daily after breakfast    levothyroxine 112 mcg, Oral, Daily (early morning)    linaCLOtide (Linzess) 290 MCG CAPS 1 capsule, Oral, Daily    metFORMIN (GLUCOPHAGE) 500 mg, Oral, Daily with breakfast    Multiple Vitamin (MULTIVITAMIN) capsule 1 capsule, Daily    naratriptan (AMERGE) 2.5 MG tablet take 1 tablet by mouth AT ONSET OF MIGRAINE may repeat in 2 hours...  (REFER TO PRESCRIPTION NOTES).    Nurtec 75 mg, Every other day    omeprazole (PRILOSEC) 20 mg, Oral, 2 times daily    topiramate (TOPAMAX) 150 mg, Oral, 2 times daily    Vibegron 75 mg, Oral, Daily    Zepbound 12.5 mg, Subcutaneous, Weekly     Allergies   Allergen Reactions    Doxycycline      Pt refuses d/t remote H/O intracranial hypertension     Other Other (See Comments)     Seasonal allergies         Objective   LMP 02/21/2025 (Approximate)      Physical Exam  Vitals and nursing note reviewed.   Constitutional:       General: She is not in acute distress.     Appearance: She is well-developed. She is obese.   HENT:      Head: Normocephalic and atraumatic.   Eyes:      General: No scleral icterus.     Conjunctiva/sclera: Conjunctivae normal.   Cardiovascular:      Rate and Rhythm: Normal rate.      Heart sounds: No murmur heard.  Pulmonary:      Effort: Pulmonary effort is normal. No respiratory distress.   Abdominal:      General: Bowel sounds are normal. There is no distension.      Palpations: Abdomen is soft.      Tenderness: There is no abdominal tenderness.   Skin:     General: Skin is warm and dry.      Coloration: Skin is not jaundiced.   Neurological:      General: No focal deficit present.      Mental Status: She is alert.   Psychiatric:         Mood and Affect: Mood normal.          Behavior: Behavior normal.       **Please note:  Dictation voice to text software may have been used in the creation of this record.  Occasional wrong word or “sound alike” substitutions may have occurred due to the inherent limitations of voice recognition software.  Read the chart carefully and recognize, using context, where substitutions have occurred.**

## 2025-04-18 NOTE — TELEPHONE ENCOUNTER
Called patient to inform her that Dr. Schuster would like her to contact [facility] at [place] for further recommendations or next steps. Patient is aware and will call.

## 2025-04-19 ENCOUNTER — NURSE TRIAGE (OUTPATIENT)
Dept: OTHER | Facility: OTHER | Age: 40
End: 2025-04-19

## 2025-04-19 ENCOUNTER — HOSPITAL ENCOUNTER (EMERGENCY)
Facility: HOSPITAL | Age: 40
Discharge: HOME | End: 2025-04-20
Attending: EMERGENCY MEDICINE
Payer: COMMERCIAL

## 2025-04-19 DIAGNOSIS — G43.909 NONINTRACTABLE CHRONIC MIGRAINE: Primary | ICD-10-CM

## 2025-04-19 LAB
ALBUMIN SERPL BCG-MCNC: 4 G/DL (ref 3.5–5)
ALP SERPL-CCNC: 71 U/L (ref 34–104)
ALT SERPL W P-5'-P-CCNC: 18 U/L (ref 7–52)
ANION GAP SERPL CALCULATED.3IONS-SCNC: 8 MMOL/L (ref 4–13)
ANISOCYTOSIS BLD QL SMEAR: PRESENT
AST SERPL W P-5'-P-CCNC: 29 U/L (ref 13–39)
BASOPHILS # BLD MANUAL: 0.14 THOUSAND/UL (ref 0–0.1)
BASOPHILS NFR MAR MANUAL: 1 % (ref 0–1)
BILIRUB SERPL-MCNC: 0.27 MG/DL (ref 0.2–1)
BUN SERPL-MCNC: 29 MG/DL (ref 5–25)
CALCIUM SERPL-MCNC: 8.8 MG/DL (ref 8.4–10.2)
CHLORIDE SERPL-SCNC: 111 MMOL/L (ref 96–108)
CO2 SERPL-SCNC: 21 MMOL/L (ref 21–32)
CREAT SERPL-MCNC: 1.09 MG/DL (ref 0.6–1.3)
EOSINOPHIL # BLD MANUAL: 0.29 THOUSAND/UL (ref 0–0.4)
EOSINOPHIL NFR BLD MANUAL: 2 % (ref 0–6)
ERYTHROCYTE [DISTWIDTH] IN BLOOD BY AUTOMATED COUNT: 13.9 % (ref 11.6–15.1)
GFR SERPL CREATININE-BSD FRML MDRD: 63 ML/MIN/1.73SQ M
GLUCOSE SERPL-MCNC: 92 MG/DL (ref 65–140)
HCT VFR BLD AUTO: 43.7 % (ref 34.8–46.1)
HGB BLD-MCNC: 13.7 G/DL (ref 11.5–15.4)
LYMPHOCYTES # BLD AUTO: 24 % (ref 14–44)
LYMPHOCYTES # BLD AUTO: 4.31 THOUSAND/UL (ref 0.6–4.47)
MCH RBC QN AUTO: 28.1 PG (ref 26.8–34.3)
MCHC RBC AUTO-ENTMCNC: 31.4 G/DL (ref 31.4–37.4)
MCV RBC AUTO: 90 FL (ref 82–98)
MICROCYTES BLD QL AUTO: PRESENT
MONOCYTES # BLD AUTO: 1.01 THOUSAND/UL (ref 0–1.22)
MONOCYTES NFR BLD: 7 % (ref 4–12)
NEUTROPHILS # BLD MANUAL: 8.63 THOUSAND/UL (ref 1.85–7.62)
NEUTS SEG NFR BLD AUTO: 60 % (ref 43–75)
PLATELET # BLD AUTO: 295 THOUSANDS/UL (ref 149–390)
PLATELET BLD QL SMEAR: ADEQUATE
PMV BLD AUTO: 9.9 FL (ref 8.9–12.7)
POIKILOCYTOSIS BLD QL SMEAR: PRESENT
POTASSIUM SERPL-SCNC: 5.4 MMOL/L (ref 3.5–5.3)
PROT SERPL-MCNC: 6.4 G/DL (ref 6.4–8.4)
RBC # BLD AUTO: 4.87 MILLION/UL (ref 3.81–5.12)
RBC MORPH BLD: PRESENT
SODIUM SERPL-SCNC: 140 MMOL/L (ref 135–147)
VARIANT LYMPHS # BLD AUTO: 6 %
WBC # BLD AUTO: 14.38 THOUSAND/UL (ref 4.31–10.16)

## 2025-04-19 PROCEDURE — 85007 BL SMEAR W/DIFF WBC COUNT: CPT | Performed by: EMERGENCY MEDICINE

## 2025-04-19 PROCEDURE — 96375 TX/PRO/DX INJ NEW DRUG ADDON: CPT

## 2025-04-19 PROCEDURE — 85027 COMPLETE CBC AUTOMATED: CPT | Performed by: EMERGENCY MEDICINE

## 2025-04-19 PROCEDURE — 99284 EMERGENCY DEPT VISIT MOD MDM: CPT | Performed by: EMERGENCY MEDICINE

## 2025-04-19 PROCEDURE — 96365 THER/PROPH/DIAG IV INF INIT: CPT

## 2025-04-19 PROCEDURE — 80053 COMPREHEN METABOLIC PANEL: CPT | Performed by: EMERGENCY MEDICINE

## 2025-04-19 PROCEDURE — 36415 COLL VENOUS BLD VENIPUNCTURE: CPT | Performed by: EMERGENCY MEDICINE

## 2025-04-19 PROCEDURE — 99284 EMERGENCY DEPT VISIT MOD MDM: CPT

## 2025-04-19 RX ORDER — CYCLOBENZAPRINE HCL 10 MG
10 TABLET ORAL ONCE
Status: COMPLETED | OUTPATIENT
Start: 2025-04-19 | End: 2025-04-19

## 2025-04-19 RX ORDER — METOCLOPRAMIDE HYDROCHLORIDE 5 MG/ML
5 INJECTION INTRAMUSCULAR; INTRAVENOUS ONCE
Status: COMPLETED | OUTPATIENT
Start: 2025-04-19 | End: 2025-04-19

## 2025-04-19 RX ORDER — KETOROLAC TROMETHAMINE 30 MG/ML
15 INJECTION, SOLUTION INTRAMUSCULAR; INTRAVENOUS ONCE
Status: COMPLETED | OUTPATIENT
Start: 2025-04-19 | End: 2025-04-19

## 2025-04-19 RX ORDER — DIPHENHYDRAMINE HYDROCHLORIDE 50 MG/ML
25 INJECTION, SOLUTION INTRAMUSCULAR; INTRAVENOUS ONCE
Status: COMPLETED | OUTPATIENT
Start: 2025-04-19 | End: 2025-04-19

## 2025-04-19 RX ORDER — MAGNESIUM SULFATE HEPTAHYDRATE 40 MG/ML
2 INJECTION, SOLUTION INTRAVENOUS ONCE
Status: COMPLETED | OUTPATIENT
Start: 2025-04-19 | End: 2025-04-20

## 2025-04-19 RX ADMIN — METOCLOPRAMIDE 5 MG: 5 INJECTION, SOLUTION INTRAMUSCULAR; INTRAVENOUS at 22:28

## 2025-04-19 RX ADMIN — KETOROLAC TROMETHAMINE 15 MG: 30 INJECTION, SOLUTION INTRAMUSCULAR at 22:30

## 2025-04-19 RX ADMIN — MAGNESIUM SULFATE HEPTAHYDRATE 2 G: 40 INJECTION, SOLUTION INTRAVENOUS at 22:32

## 2025-04-19 RX ADMIN — DIPHENHYDRAMINE HYDROCHLORIDE 25 MG: 50 INJECTION, SOLUTION INTRAMUSCULAR; INTRAVENOUS at 22:29

## 2025-04-19 RX ADMIN — CYCLOBENZAPRINE HYDROCHLORIDE 10 MG: 10 TABLET, FILM COATED ORAL at 22:27

## 2025-04-19 RX ADMIN — SODIUM CHLORIDE 1000 ML: 0.9 INJECTION, SOLUTION INTRAVENOUS at 22:27

## 2025-04-19 NOTE — TELEPHONE ENCOUNTER
"FOLLOW UP: none    REASON FOR CONVERSATION: Headache    SYMPTOMS: headache, extreme fatigue, and weakness    OTHER: n/a    DISPOSITION: Go to ED Now (or PCP Triage)          Reason for Disposition   Patient sounds very sick or weak to the triager    Answer Assessment - Initial Assessment Questions  1. LOCATION: \"Where does it hurt?\"   Frontal head and warps around to back     2. ONSET: \"When did the headache start?\" (e.g., minutes, hours, days)          6 weeks ago    3. PATTERN: \"Does the pain come and go, or has it been constant since it started?\"        Constant    4. SEVERITY: \"How bad is the pain?\" and \"What does it keep you from doing?\"  (e.g., Scale 1-10; mild, moderate, or severe)        Dull pain - 5/10 currently. Worse when patient touches head      5. RECURRENT SYMPTOM: \"Have you ever had headaches before?\" If Yes, ask: \"When was the last time?\" and \"What happened that time?\"         Yes     6. CAUSE: \"What do you think is causing the headache?\"        Unsure, possibly medication luvox. Dose increased 1 month   7. MIGRAINE: \"Have you been diagnosed with migraine headaches?\" If Yes, ask: \"Is this headache similar?\"         Yes     8. HEAD INJURY: \"Has there been any recent injury to the head?\"         Denies    9. OTHER SYMPTOMS: \"Do you have any other symptoms?\" (e.g., fever, stiff neck, eye pain, sore throat, cold symptoms)        Loss of 6lbs since Thursday, extreme fatigue, weak    Having a hard time doing things since yesterday . Feels weird and \"spacy\"  scheduled for the headache unit at [place] for May 12th    Protocols used: Headache-Adult-    "

## 2025-04-19 NOTE — ASSESSMENT & PLAN NOTE
Pt does have hx of heartburn, on PPI BID with adequate control of symptoms.  No alarm features to the UGI tract that would warrant EGD at this time.  Will discuss tapering dosing at f/u OV if possible.    Recommend diet and lifestyle modifications for GERD.  This includes avoiding spicy, saucy, greasy/oily foods, citrus, EtOH, NSAIDs, tobacco.  Avoid eating within 2 to 3 hours of bed.  Elevating height of bed 6 inches on blocks may be beneficial.  Weight loss would likely be beneficial.

## 2025-04-20 ENCOUNTER — NURSE TRIAGE (OUTPATIENT)
Dept: OTHER | Facility: OTHER | Age: 40
End: 2025-04-20

## 2025-04-20 VITALS
SYSTOLIC BLOOD PRESSURE: 143 MMHG | DIASTOLIC BLOOD PRESSURE: 66 MMHG | RESPIRATION RATE: 20 BRPM | HEART RATE: 61 BPM | OXYGEN SATURATION: 99 % | TEMPERATURE: 97.7 F

## 2025-04-20 RX ORDER — CYCLOBENZAPRINE HCL 10 MG
10 TABLET ORAL 2 TIMES DAILY PRN
Qty: 20 TABLET | Refills: 0 | Status: SHIPPED | OUTPATIENT
Start: 2025-04-20

## 2025-04-20 NOTE — ED PROVIDER NOTES
Time reflects when diagnosis was documented in both MDM as applicable and the Disposition within this note       Time User Action Codes Description Comment    4/20/2025 12:17 AM Serenity Quintero Add [G43.909] Nonintractable chronic migraine           ED Disposition       ED Disposition   Discharge    Condition   Stable    Date/Time   Sun Apr 20, 2025 12:16 AM    Comment   Gita Rendon discharge to home/self care.                   Assessment & Plan       Medical Decision Making  39-year-old female presents for evaluation of intractable migraine.  Patient anticipates admission to [facility] headache unit next month due to this.  She has been taking her medications at home without significant relief.  She appears to be neurologically intact on examination, will defer head imaging at this time.  Will try migraine cocktail with Flexeril to help with her symptoms, will check basic labs.    Amount and/or Complexity of Data Reviewed  Labs: ordered. Decision-making details documented in ED Course.    Risk  Prescription drug management.        ED Course as of 04/20/25 0047   Sat Apr 19, 2025   2249 Potassium(!): 5.4  With moderate hemolysis   Sun Apr 20, 2025   0016 Still with headache, feels tired. Uncertain if flexeril helped but accepting of rx to try at home.        Medications   cyclobenzaprine (FLEXERIL) tablet 10 mg (10 mg Oral Given 4/19/25 2227)   ketorolac (TORADOL) injection 15 mg (15 mg Intravenous Given 4/19/25 2230)   metoclopramide (REGLAN) injection 5 mg (5 mg Intravenous Given 4/19/25 2228)   diphenhydrAMINE (BENADRYL) injection 25 mg (25 mg Intravenous Given 4/19/25 2229)   magnesium sulfate 2 g/50 mL IVPB (premix) 2 g (0 g Intravenous Stopped 4/20/25 0000)   sodium chloride 0.9 % bolus 1,000 mL (0 mL Intravenous Stopped 4/20/25 0000)       ED Risk Strat Scores                    No data recorded        SBIRT 22yo+      Flowsheet Row Most Recent Value   Initial Alcohol Screen: US AUDIT-C     1.  How often do you have a drink containing alcohol? 0 Filed at: 04/19/2025 2122   2. How many drinks containing alcohol do you have on a typical day you are drinking?  0 Filed at: 04/19/2025 2122   3b. FEMALE Any Age, or MALE 65+: How often do you have 4 or more drinks on one occassion? 0 Filed at: 04/19/2025 2122   Audit-C Score 0 Filed at: 04/19/2025 2122   JENNIFER: How many times in the past year have you...    Used an illegal drug or used a prescription medication for non-medical reasons? Never Filed at: 04/19/2025 2122                            History of Present Illness       Chief Complaint   Patient presents with    Headache     Pt. Arrives ambulatory to ED from home with complaints of headaches.  Pt. W/ hx headaches, follows with [facility] for this, has an upcoming admission in May for a weeklong tx for this.  Pt. Denies any falls or injuries, endorses generalized weakness as well.        Past Medical History:   Diagnosis Date    ADHD (attention deficit hyperactivity disorder)     Allergic     Allergic rhinitis     Anorexia nervosa in remission     Anxiety     Arthritis 5/8/2014    Back pain     Bipolar disorder (HCC)     Chronic diarrhea     Chronic pain disorder     Depression     Dermatitis 10/16/2021    Disease of thyroid gland     Diverticulitis of colon 9/20/2015    Dizziness     Ear problems     Eating disorder     Environmental and seasonal allergies     Esophageal reflux 08/15/2013    GERD (gastroesophageal reflux disease) 8/15/2013    Headache(784.0)     Headache, tension-type     Hypertension     Hypothyroid     Idiopathic intracranial hypertension     Impulse control disorder     Lumbar degenerative disc disease 05/08/2014    Memory loss     Migraine     Nasal congestion     Nosebleed     Obesity     Obsessive-compulsive disorder     Osteoarthritis 5/8/2014    Otitis media     Overactive bladder     Panic attack     Psychiatric disorder     Psychiatric illness     PTSD (post-traumatic stress  disorder)     Restless leg syndrome, controlled     Seasonal allergies     Self-injurious behavior     Sinus problem     Sinusitis     Sleep apnea     Sleep difficulties     Suicide and self-inflicted injury (HCC)     Suicide attempt (HCC)     Tinnitus     TMJ dysfunction     Tonsillitis     Transcranial Magnetic Stimulation 09/06/2021    Urinary incontinence     Urinary tract infection     Vision loss       Past Surgical History:   Procedure Laterality Date    COLONOSCOPY  10/5/2015    DENTAL SURGERY      wisdom teeth-at [age] years. Other surgery dental extraxtion of bone spur (10 years ago)    IR LUMBAR PUNCTURE  02/26/2019    SINUS ENDOSCOPY      SINUS SURGERY      TONSILLECTOMY        Family History   Problem Relation Age of Onset    Mental illness Mother     Depression Mother     Suicide Attempts Mother     Hypertension Mother     Diabetes Mother     Anxiety disorder Mother     Psychiatric Illness Mother     Schizoaffective Disorder  Mother     Anemia Mother     Colon polyps Mother     Transient ischemic attack Mother     Snoring Mother     Hypertension Father     Hypothyroidism Father     Thyroid disease Father     Sudden death Father     Psoriasis Father     Heart disease Father     Hypertension Brother     Cancer Maternal Grandmother     Heart disease Maternal Grandmother     Stroke Maternal Grandmother     Breast cancer Maternal Grandmother     Skin cancer Maternal Grandmother     Arthritis Maternal Grandmother     Pneumonia Maternal Grandfather     Cancer Maternal Grandfather     Dementia Maternal Grandfather     COPD Maternal Grandfather     Hearing loss Maternal Grandfather     Cancer Paternal Grandmother     Pneumonia Paternal Grandfather     Arthritis Family     Breast cancer Family     Osteopenia Family     Osteoporosis Family     Hypertension Family     Transient ischemic attack Family       Social History     Tobacco Use    Smoking status: Never     Passive exposure: Never    Smokeless tobacco:  Never   Vaping Use    Vaping status: Never Used   Substance Use Topics    Alcohol use: Never    Drug use: Never      E-Cigarette/Vaping    E-Cigarette Use Never User       E-Cigarette/Vaping Substances    Nicotine No     THC No     CBD No     Flavoring No     Other No     Unknown No       I have reviewed and agree with the history as documented.     39-year-old female presents for evaluation of migraine.  Patient has a history of intractable chronic migraine and is following with [facility], she anticipates admission to their headache unit next month.  She just reports ongoing fatigue and sleepiness.  She has been taking her medications at home without effect to her headache, she does report that steroids help however she has been told to stop taking steroids by her neurologist.  She reports eating and drinking normally, denies significant nausea or vomiting, diarrhea episodes.  She has had no recent fevers or illnesses.  Headache appears to be in her normal areas and normal quality.        Review of Systems   Constitutional:  Positive for fatigue. Negative for activity change and appetite change.   HENT:  Negative for congestion.    Respiratory:  Negative for cough.    Gastrointestinal:  Negative for abdominal pain, nausea and vomiting.   Genitourinary:  Negative for menstrual problem.   Musculoskeletal:  Negative for back pain.   Neurological:  Positive for headaches. Negative for weakness and numbness.   All other systems reviewed and are negative.          Objective       ED Triage Vitals [04/19/25 2120]   Temperature Pulse Blood Pressure Respirations SpO2 Patient Position - Orthostatic VS   (!) 97.3 °F (36.3 °C) 72 140/67 19 99 % Lying      Temp Source Heart Rate Source BP Location FiO2 (%) Pain Score    Temporal Monitor Left arm -- 7      Vitals      Date and Time Temp Pulse SpO2 Resp BP Pain Score FACES Pain Rating User   04/20/25 0034 97.7 °F (36.5 °C) -- -- 20 -- -- -- SS   04/20/25 0000 --  61 99 % -- -- -- --    04/19/25 2330 -- 64 99 % -- 143/66 -- --    04/19/25 2300 -- 61 100 % -- 139/73 6 --    04/19/25 2230 -- 66 100 % -- 134/60 6 --    04/19/25 2200 -- 60 100 % -- 133/63 -- --    04/19/25 2120 97.3 °F (36.3 °C) 72 99 % 19 140/67 7 -- SS            Physical Exam  Vitals reviewed.   Constitutional:       General: She is not in acute distress.     Appearance: Normal appearance. She is not ill-appearing, toxic-appearing or diaphoretic.   HENT:      Head: Normocephalic and atraumatic.      Right Ear: External ear normal.      Left Ear: External ear normal.      Nose: Nose normal.   Eyes:      General: No scleral icterus.        Right eye: No discharge.         Left eye: No discharge.      Extraocular Movements: Extraocular movements intact.   Cardiovascular:      Rate and Rhythm: Normal rate.   Pulmonary:      Effort: Pulmonary effort is normal. No respiratory distress.   Musculoskeletal:         General: No deformity or signs of injury.      Right lower leg: No edema.      Left lower leg: No edema.   Skin:     General: Skin is warm.      Coloration: Skin is not jaundiced or pale.   Neurological:      General: No focal deficit present.      Mental Status: She is alert. Mental status is at baseline.      Gait: Gait normal.         Results Reviewed       Procedure Component Value Units Date/Time    RBC Morphology Reflex Test [[id number]] Collected: 04/19/25 2210    Lab Status: Final result Specimen: Blood from Arm, Right Updated: 04/19/25 2302    CBC and differential [[id number]]  (Abnormal) Collected: 04/19/25 2210    Lab Status: Final result Specimen: Blood from Arm, Right Updated: 04/19/25 2256     WBC 14.38 Thousand/uL      RBC 4.87 Million/uL      Hemoglobin 13.7 g/dL      Hematocrit 43.7 %      MCV 90 fL      MCH 28.1 pg      MCHC 31.4 g/dL      RDW 13.9 %      MPV 9.9 fL      Platelets 295 Thousands/uL     Narrative:      This is an appended report.  These results have been appended to a  previously verified report.    Manual Differential(PHLEBS Do Not Order) [[id number]]  (Abnormal) Collected: 04/19/25 2210    Lab Status: Final result Specimen: Blood from Arm, Right Updated: 04/19/25 2256     Segmented % 60 %      Lymphocytes % 24 %      Monocytes % 7 %      Eosinophils % 2 %      Basophils % 1 %      Atypical Lymphocytes % 6 %      Absolute Neutrophils 8.63 Thousand/uL      Absolute Lymphocytes 4.31 Thousand/uL      Absolute Monocytes 1.01 Thousand/uL      Absolute Eosinophils 0.29 Thousand/uL      Absolute Basophils 0.14 Thousand/uL      Total Counted --     RBC Morphology Present     Platelet Estimate Adequate     Anisocytosis Present     Microcytes Present     Poikilocytes Present    Comprehensive metabolic panel [[id number]]  (Abnormal) Collected: 04/19/25 2210    Lab Status: Final result Specimen: Blood from Arm, Right Updated: 04/19/25 2238     Sodium 140 mmol/L      Potassium 5.4 mmol/L      Chloride 111 mmol/L      CO2 21 mmol/L      ANION GAP 8 mmol/L      BUN 29 mg/dL      Creatinine 1.09 mg/dL      Glucose 92 mg/dL      Calcium 8.8 mg/dL      AST 29 U/L      ALT 18 U/L      Alkaline Phosphatase 71 U/L      Total Protein 6.4 g/dL      Albumin 4.0 g/dL      Total Bilirubin 0.27 mg/dL      eGFR 63 ml/min/1.73sq m     Narrative:      National Kid[employer] Disease Foundation guidelines for Chronic Kidney Disease (CKD):     Stage 1 with normal or high GFR (GFR > 90 mL/min/1.73 square meters)    Stage 2 Mild CKD (GFR = 60-89 mL/min/1.73 square meters)    Stage 3A Moderate CKD (GFR = 45-59 mL/min/1.73 square meters)    Stage 3B Moderate CKD (GFR = 30-44 mL/min/1.73 square meters)    Stage 4 Severe CKD (GFR = 15-29 mL/min/1.73 square meters)    Stage 5 End Stage CKD (GFR <15 mL/min/1.73 square meters)  Note: GFR calculation is accurate only with a steady state creatinine            No orders to display       Procedures    ED Medication and Procedure Management   Prior to Admission Medications    Prescriptions Last Dose Informant Patient Reported? Taking?   Biotin 5000 MCG CAPS   Yes No   Sig: Take by mouth in the morning   Cequa 0.09 % SOLN  Self No No   Sig: Apply 1 drop to eye 2 (two) times a day   Collagen-Vitamin C-Biotin (COLLAGEN PO)   Yes No   Sig: Take by mouth daily   DULoxetine (CYMBALTA) 60 mg delayed release capsule  Self No No   Sig: Take 2 capsules (120 mg total) by mouth daily   Multiple Vitamin (MULTIVITAMIN) capsule  Self Yes No   Sig: Take 1 capsule by mouth daily   Nurtec 75 MG TBDP  Self Yes No   Sig: Take 75 mg by mouth every other day   Vibegron 75 MG TABS  Self No No   Sig: Take 75 mg by mouth in the morning   acetaZOLAMIDE (DIAMOX) 250 mg tablet  Self No No   Sig: Take 2 tablets (500 mg total) by mouth 2 (two) times a day   cariprazine (VRAYLAR) 3 MG capsule  Self Yes No   Sig: Take 3 mg by mouth daily at bedtime   cariprazine (Vraylar) 6 MG capsule  Self No No   Sig: Take 1 capsule (6 mg total) by mouth daily   Patient taking differently: Take 6 mg by mouth daily with breakfast   celecoxib (CeleBREX) 200 mg capsule  Self No No   Sig: Take 1 capsule (200 mg total) by mouth 2 (two) times a day   cetirizine (ZyrTEC) 10 MG chewable tablet  Self Yes No   Sig: Chew 10 mg daily   docusate sodium (COLACE) 100 mg capsule  Self Yes No   Sig: Take 100 mg by mouth 2 (two) times a day   fluvoxaMINE (LUVOX) 50 mg tablet   Yes No   Sig: Take 50 mg by mouth daily with breakfast   folic acid (FOLVITE) 800 MCG tablet   Yes No   Sig: Take 800 mcg by mouth daily   gabapentin (NEURONTIN) 300 mg capsule  Self No No   Sig: Take 1 capsule (300 mg total) by mouth daily after breakfast   gabapentin (NEURONTIN) 600 MG tablet  Self No No   Sig: Take 1.5 tablets (900 mg total) by mouth daily at bedtime   levothyroxine 112 mcg tablet  Self No No   Sig: Take 1 tablet (112 mcg total) by mouth daily in the early morning   lubiprostone (AMITIZA) 24 mcg capsule   No No   Sig: Take 1 capsule (24 mcg total) by mouth  2 (two) times a day with meals   metFORMIN (GLUCOPHAGE) 500 mg tablet  Self No No   Sig: Take 1 tablet (500 mg total) by mouth daily with breakfast   naratriptan (AMERGE) 2.5 MG tablet  Self Yes No   Sig: take 1 tablet by mouth AT ONSET OF MIGRAINE may repeat in 2 hours...  (REFER TO PRESCRIPTION NOTES).   omeprazole (PriLOSEC) 20 mg delayed release capsule   No No   Sig: Take 1 capsule (20 mg total) by mouth 2 (two) times a day   tirzepatide (Zepbound) 12.5 mg/0.5 mL auto-injector   No No   Sig: Inject 0.5 mL (12.5 mg total) under the skin once a week for 28 days   topiramate (TOPAMAX) 100 mg tablet  Self Yes No   Sig: Take 150 mg by mouth 2 (two) times a day      Facility-Administered Medications: None     Discharge Medication List as of 4/20/2025 12:19 AM        CONTINUE these medications which have NOT CHANGED    Details   acetaZOLAMIDE (DIAMOX) 250 mg tablet Take 2 tablets (500 mg total) by mouth 2 (two) times a day, Starting Mon 11/4/2024, No Print      Biotin 5000 MCG CAPS Take by mouth in the morning, Historical Med      cariprazine (VRAYLAR) 3 MG capsule Take 3 mg by mouth daily at bedtime, Historical Med      celecoxib (CeleBREX) 200 mg capsule Take 1 capsule (200 mg total) by mouth 2 (two) times a day, Starting Tue 4/16/2024, Normal      Cequa 0.09 % SOLN Apply 1 drop to eye 2 (two) times a day, Starting Sat 8/5/2023, Normal      cetirizine (ZyrTEC) 10 MG chewable tablet Chew 10 mg daily, Historical Med      Collagen-Vitamin C-Biotin (COLLAGEN PO) Take by mouth daily, Historical Med      docusate sodium (COLACE) 100 mg capsule Take 100 mg by mouth 2 (two) times a day, Historical Med      DULoxetine (CYMBALTA) 60 mg delayed release capsule Take 2 capsules (120 mg total) by mouth daily, Starting Tue 11/5/2024, Until Thu 4/17/2025, Normal      fluvoxaMINE (LUVOX) 50 mg tablet Take 50 mg by mouth daily with breakfast, Starting Fri 4/11/2025, Historical Med      folic acid (FOLVITE) 800 MCG tablet Take 800 mcg  by mouth daily, Historical Med      gabapentin (NEURONTIN) 300 mg capsule Take 1 capsule (300 mg total) by mouth daily after breakfast, Starting Fri 7/12/2024, No Print      gabapentin (NEURONTIN) 600 MG tablet Take 1.5 tablets (900 mg total) by mouth daily at bedtime, Starting Tue 4/16/2024, Normal      levothyroxine 112 mcg tablet Take 1 tablet (112 mcg total) by mouth daily in the early morning, Starting Fri 2/28/2025, Normal      lubiprostone (AMITIZA) 24 mcg capsule Take 1 capsule (24 mcg total) by mouth 2 (two) times a day with meals, Starting Thu 4/17/2025, Normal      metFORMIN (GLUCOPHAGE) 500 mg tablet Take 1 tablet (500 mg total) by mouth daily with breakfast, Starting Mon 1/27/2025, Normal      Multiple Vitamin (MULTIVITAMIN) capsule Take 1 capsule by mouth daily, Historical Med      naratriptan (AMERGE) 2.5 MG tablet take 1 tablet by mouth AT ONSET OF MIGRAINE may repeat in 2 hours...  (REFER TO PRESCRIPTION NOTES)., Historical Med      Nurtec 75 MG TBDP Take 75 mg by mouth every other day, Starting Tue 9/3/2024, Historical Med      omeprazole (PriLOSEC) 20 mg delayed release capsule Take 1 capsule (20 mg total) by mouth 2 (two) times a day, Starting Fri 4/11/2025, Normal      tirzepatide (Zepbound) 12.5 mg/0.5 mL auto-injector Inject 0.5 mL (12.5 mg total) under the skin once a week for 28 days, Starting Thu 4/3/2025, Until Thu 5/1/2025, Normal      topiramate (TOPAMAX) 100 mg tablet Take 150 mg by mouth 2 (two) times a day, Starting Sat 6/1/2024, Historical Med      Vibegron 75 MG TABS Take 75 mg by mouth in the morning, Starting Thu 2/13/2025, Normal           No discharge procedures on file.  ED SEPSIS DOCUMENTATION   Time reflects when diagnosis was documented in both MDM as applicable and the Disposition within this note       Time User Action Codes Description Comment    4/20/2025 12:17 AM Serenity Quintero Add [G43.909] Nonintractable chronic migraine                  Serenity Quintero,  DO  04/20/25 0047

## 2025-04-20 NOTE — TELEPHONE ENCOUNTER
"FOLLOW UP: No     REASON FOR CONVERSATION: Fatigue    SYMPTOMS: fatigue for 3 weeks, struggling to get up out of bed, has been having headaches, she feels like she is sleeping enough and drinking enough fluids.     OTHER: appointment made for 4/21 at 11:15    DISPOSITION: See PCP Within 3 Days      Reason for Disposition   [1] Fatigue (i.e., tires easily, decreased energy) AND [2] persists > 1 week    Answer Assessment - Initial Assessment Questions  1. DESCRIPTION: \"Describe how you are feeling.\"        As per patient she overall feels terrible, struggle to get out of bed.     2. SEVERITY: \"How bad is it?\"  \"Can you stand and walk?\"        Struggling with waking up and staying up     3. ONSET: \"When did these symptoms begin?\" (e.g., hours, days, weeks, months)        About 3 weeks ago     4. CAUSE: \"What do you think is causing the weakness or fatigue?\" (e.g., not drinking enough fluids, medical problem, trouble sleeping)        Unknown - she feels that she sleeps enough at night     5. NEW MEDICINES:  \"Have you started on any new medicines recently?\" (e.g., opioid pain medicines, benzodiazepines, muscle relaxants, antidepressants, antihistamines, neuroleptics, beta blockers)       none    6. OTHER SYMPTOMS: \"Do you have any other symptoms?\" (e.g., chest pain, fever, cough, SOB, vomiting, diarrhea, bleeding, other areas of pain)        Shortness of breath at rest     7. PREGNANCY: \"Is there any chance you are pregnant?\" \"When was your last menstrual period?\"        none    Protocols used: Weakness (Generalized) and Fatigue-Adult-AH    "
no

## 2025-04-21 ENCOUNTER — OFFICE VISIT (OUTPATIENT)
Dept: FAMILY MEDICINE CLINIC | Facility: CLINIC | Age: 40
End: 2025-04-21
Payer: COMMERCIAL

## 2025-04-21 ENCOUNTER — TELEPHONE (OUTPATIENT)
Age: 40
End: 2025-04-21

## 2025-04-21 ENCOUNTER — TELEPHONE (OUTPATIENT)
Dept: FAMILY MEDICINE CLINIC | Facility: CLINIC | Age: 40
End: 2025-04-21

## 2025-04-21 VITALS
OXYGEN SATURATION: 97 % | DIASTOLIC BLOOD PRESSURE: 80 MMHG | WEIGHT: 293 LBS | BODY MASS INDEX: 45.99 KG/M2 | TEMPERATURE: 96.9 F | HEIGHT: 67 IN | SYSTOLIC BLOOD PRESSURE: 100 MMHG | HEART RATE: 83 BPM

## 2025-04-21 DIAGNOSIS — F33.2 MAJOR DEPRESSIVE DISORDER, RECURRENT SEVERE WITHOUT PSYCHOTIC FEATURES (HCC): ICD-10-CM

## 2025-04-21 DIAGNOSIS — R53.82 CHRONIC FATIGUE: ICD-10-CM

## 2025-04-21 DIAGNOSIS — G47.33 OBSTRUCTIVE SLEEP APNEA: Primary | ICD-10-CM

## 2025-04-21 PROCEDURE — 99214 OFFICE O/P EST MOD 30 MIN: CPT | Performed by: PHYSICIAN ASSISTANT

## 2025-04-21 PROCEDURE — G2211 COMPLEX E/M VISIT ADD ON: HCPCS | Performed by: PHYSICIAN ASSISTANT

## 2025-04-21 NOTE — TELEPHONE ENCOUNTER
Patient called asking if she needs labs done for her 5/5 appointment. Confirmed that yes, there are labs ordered for her.  No further action needed

## 2025-04-21 NOTE — PROGRESS NOTES
Name: Gita Rendon      : 1985      MRN: [id number]  Encounter Provider: Luis Rosenberg PA-C  Encounter Date: 2025   Encounter department: [facility] PRIMARY CARE  :  Assessment & Plan  Obstructive sleep apnea    Orders:    Ambulatory Referral to Sleep Medicine; Future    Major depressive disorder, recurrent severe without psychotic features (HCC)     - See below        Chronic fatigue     - Pt presents w/ chronic fatigue that has been worsening over the past 2-3 weeks    - Pt notably has had ongoing issue w/ intractable migraines that has not been resolved and has caused 2-3 ED visits per month of late    - Pt has migraine today but no new onset features such as double vision, photophobia, N/V, or head trauma    - Pt record reviewed at length by PCP and all labs pertinent to fatigue have been collected within the last 6 months and were WNL so PCP explained to patient collecting new labs would likely be low yield     - PCP believes chronic fatigue is due to multitude of chronic, uncontrolled conditions like migraine, CAIO, depression    - Will be undergoing hospitalization to determine chronic migraine management, following w/ psychiatrist this week, and will use new referral to sleep med to re-establish \   - Plan to see patient again after hospitalization for migraine management               History of Present Illness   Fatigue  This is a chronic problem. The current episode started 1 to 4 weeks ago. The problem occurs constantly. The problem has been waxing and waning. Associated symptoms include fatigue and headaches. Pertinent negatives include no arthralgias, chest pain, congestion, coughing, fever, nausea, swollen glands or vomiting. Nothing aggravates the symptoms. She has tried nothing for the symptoms.     Review of Systems   Constitutional:  Positive for fatigue. Negative for fever.   HENT:  Negative for congestion.    Respiratory:  Negative for cough and shortness of  "breath.    Cardiovascular:  Negative for chest pain.   Gastrointestinal:  Negative for diarrhea, nausea and vomiting.   Musculoskeletal:  Negative for arthralgias.   Neurological:  Positive for headaches.       Objective   /80 (BP Location: Right arm, Patient Position: Sitting, Cuff Size: Large)   Pulse 83   Temp (!) 96.9 °F (36.1 °C) (Tympanic)   Ht 5' 6.5\" (1.689 m)   Wt (!) 156 kg (343 lb 3.2 oz)   LMP 02/21/2025 (Approximate)   SpO2 97%   BMI 54.56 kg/m²      Physical Exam  Constitutional:       Appearance: Normal appearance. She is obese.   HENT:      Head: Normocephalic.      Right Ear: External ear normal.      Left Ear: External ear normal.      Nose: Nose normal.      Mouth/Throat:      Mouth: Mucous membranes are moist.      Pharynx: Oropharynx is clear.   Eyes:      Conjunctiva/sclera: Conjunctivae normal.   Cardiovascular:      Rate and Rhythm: Normal rate and regular rhythm.      Heart sounds: Normal heart sounds.   Pulmonary:      Effort: Pulmonary effort is normal.      Breath sounds: Normal breath sounds.   Abdominal:      General: Bowel sounds are normal.      Palpations: Abdomen is soft.   Neurological:      Mental Status: She is alert and oriented to person, place, and time.      Cranial Nerves: Cranial nerves 2-12 are intact.      Sensory: Sensation is intact.      Motor: Motor function is intact.      Gait: Gait is intact.   Psychiatric:         Behavior: Behavior normal.         "

## 2025-04-21 NOTE — TELEPHONE ENCOUNTER
Pt called in to return missed call from office, Triage nurse warm tx pt to Clerical staff:Lia to further assist pt better with an appt change. Lia offered an appt change from here.

## 2025-04-22 ENCOUNTER — APPOINTMENT (OUTPATIENT)
Dept: LAB | Facility: CLINIC | Age: 40
End: 2025-04-22
Attending: INTERNAL MEDICINE
Payer: COMMERCIAL

## 2025-04-22 ENCOUNTER — RESULTS FOLLOW-UP (OUTPATIENT)
Dept: NEPHROLOGY | Facility: CLINIC | Age: 40
End: 2025-04-22

## 2025-04-22 DIAGNOSIS — E26.9 HYPERALDOSTERONISM (HCC): ICD-10-CM

## 2025-04-22 LAB
ALBUMIN SERPL BCG-MCNC: 4 G/DL (ref 3.5–5)
ALP SERPL-CCNC: 91 U/L (ref 34–104)
ALT SERPL W P-5'-P-CCNC: 18 U/L (ref 7–52)
ANION GAP SERPL CALCULATED.3IONS-SCNC: 9 MMOL/L (ref 4–13)
AST SERPL W P-5'-P-CCNC: 16 U/L (ref 13–39)
BACTERIA UR QL AUTO: ABNORMAL /HPF
BASOPHILS # BLD AUTO: 0.09 THOUSANDS/ÂΜL (ref 0–0.1)
BASOPHILS NFR BLD AUTO: 1 % (ref 0–1)
BILIRUB SERPL-MCNC: 0.32 MG/DL (ref 0.2–1)
BILIRUB UR QL STRIP: NEGATIVE
BUN SERPL-MCNC: 28 MG/DL (ref 5–25)
CALCIUM SERPL-MCNC: 9.1 MG/DL (ref 8.4–10.2)
CAOX CRY URNS QL MICRO: ABNORMAL /HPF
CHLORIDE SERPL-SCNC: 113 MMOL/L (ref 96–108)
CLARITY UR: CLEAR
CO2 SERPL-SCNC: 20 MMOL/L (ref 21–32)
COLOR UR: ABNORMAL
CREAT SERPL-MCNC: 0.85 MG/DL (ref 0.6–1.3)
CREAT UR-MCNC: 203.5 MG/DL
EOSINOPHIL # BLD AUTO: 0.4 THOUSAND/ÂΜL (ref 0–0.61)
EOSINOPHIL NFR BLD AUTO: 3 % (ref 0–6)
ERYTHROCYTE [DISTWIDTH] IN BLOOD BY AUTOMATED COUNT: 14.2 % (ref 11.6–15.1)
GFR SERPL CREATININE-BSD FRML MDRD: 85 ML/MIN/1.73SQ M
GLUCOSE P FAST SERPL-MCNC: 95 MG/DL (ref 65–99)
GLUCOSE UR STRIP-MCNC: NEGATIVE MG/DL
HCT VFR BLD AUTO: 45.7 % (ref 34.8–46.1)
HGB BLD-MCNC: 14.5 G/DL (ref 11.5–15.4)
HGB UR QL STRIP.AUTO: NEGATIVE
IMM GRANULOCYTES # BLD AUTO: 0.09 THOUSAND/UL (ref 0–0.2)
IMM GRANULOCYTES NFR BLD AUTO: 1 % (ref 0–2)
KETONES UR STRIP-MCNC: NEGATIVE MG/DL
LEUKOCYTE ESTERASE UR QL STRIP: NEGATIVE
LYMPHOCYTES # BLD AUTO: 2.88 THOUSANDS/ÂΜL (ref 0.6–4.47)
LYMPHOCYTES NFR BLD AUTO: 22 % (ref 14–44)
MCH RBC QN AUTO: 28.8 PG (ref 26.8–34.3)
MCHC RBC AUTO-ENTMCNC: 31.7 G/DL (ref 31.4–37.4)
MCV RBC AUTO: 91 FL (ref 82–98)
MICROALBUMIN UR-MCNC: 9.8 MG/L
MICROALBUMIN/CREAT 24H UR: 5 MG/G CREATININE (ref 0–30)
MONOCYTES # BLD AUTO: 0.93 THOUSAND/ÂΜL (ref 0.17–1.22)
MONOCYTES NFR BLD AUTO: 7 % (ref 4–12)
MUCOUS THREADS UR QL AUTO: ABNORMAL
NEUTROPHILS # BLD AUTO: 8.8 THOUSANDS/ÂΜL (ref 1.85–7.62)
NEUTS SEG NFR BLD AUTO: 66 % (ref 43–75)
NITRITE UR QL STRIP: NEGATIVE
NON-SQ EPI CELLS URNS QL MICRO: ABNORMAL /HPF
NRBC BLD AUTO-RTO: 0 /100 WBCS
PH UR STRIP.AUTO: 6 [PH]
PLATELET # BLD AUTO: 308 THOUSANDS/UL (ref 149–390)
PMV BLD AUTO: 10.4 FL (ref 8.9–12.7)
POTASSIUM SERPL-SCNC: 3.7 MMOL/L (ref 3.5–5.3)
PROT SERPL-MCNC: 6.5 G/DL (ref 6.4–8.4)
PROT UR STRIP-MCNC: ABNORMAL MG/DL
PTH-INTACT SERPL-MCNC: 45.7 PG/ML (ref 12–88)
RBC # BLD AUTO: 5.04 MILLION/UL (ref 3.81–5.12)
RBC #/AREA URNS AUTO: ABNORMAL /HPF
SODIUM SERPL-SCNC: 142 MMOL/L (ref 135–147)
SP GR UR STRIP.AUTO: 1.03 (ref 1–1.03)
URATE SERPL-MCNC: 4.8 MG/DL (ref 2–7.5)
UROBILINOGEN UR STRIP-ACNC: <2 MG/DL
WBC # BLD AUTO: 13.19 THOUSAND/UL (ref 4.31–10.16)
WBC #/AREA URNS AUTO: ABNORMAL /HPF

## 2025-04-22 PROCEDURE — 80053 COMPREHEN METABOLIC PANEL: CPT

## 2025-04-22 PROCEDURE — 83970 ASSAY OF PARATHORMONE: CPT

## 2025-04-22 PROCEDURE — 82088 ASSAY OF ALDOSTERONE: CPT

## 2025-04-22 PROCEDURE — 84550 ASSAY OF BLOOD/URIC ACID: CPT

## 2025-04-22 PROCEDURE — 84244 ASSAY OF RENIN: CPT

## 2025-04-22 PROCEDURE — 85025 COMPLETE CBC W/AUTO DIFF WBC: CPT

## 2025-04-22 PROCEDURE — 82570 ASSAY OF URINE CREATININE: CPT

## 2025-04-22 PROCEDURE — 81001 URINALYSIS AUTO W/SCOPE: CPT

## 2025-04-22 PROCEDURE — 82043 UR ALBUMIN QUANTITATIVE: CPT

## 2025-04-22 PROCEDURE — 36415 COLL VENOUS BLD VENIPUNCTURE: CPT

## 2025-04-26 DIAGNOSIS — E66.01 MORBID OBESITY (HCC): ICD-10-CM

## 2025-04-28 LAB
ALDOST SERPL-MCNC: 11.2 NG/DL (ref 0–30)
ALDOST/RENIN PLAS-RTO: 8 {RATIO} (ref 0–30)
RENIN PLAS-CCNC: 1.39 NG/ML/HR (ref 0.17–5.38)

## 2025-04-29 DIAGNOSIS — E66.01 MORBID OBESITY (HCC): Primary | ICD-10-CM

## 2025-04-29 RX ORDER — TIRZEPATIDE 12.5 MG/.5ML
INJECTION, SOLUTION SUBCUTANEOUS
Qty: 4 ML | Refills: 0 | OUTPATIENT
Start: 2025-04-29

## 2025-04-29 RX ORDER — TIRZEPATIDE 15 MG/.5ML
15 INJECTION, SOLUTION SUBCUTANEOUS WEEKLY
Qty: 2 ML | Refills: 3 | Status: SHIPPED | OUTPATIENT
Start: 2025-04-29

## 2025-05-01 ENCOUNTER — PATIENT MESSAGE (OUTPATIENT)
Dept: GASTROENTEROLOGY | Facility: CLINIC | Age: 40
End: 2025-05-01

## 2025-05-01 ENCOUNTER — OFFICE VISIT (OUTPATIENT)
Dept: BARIATRICS | Facility: CLINIC | Age: 40
End: 2025-05-01
Payer: COMMERCIAL

## 2025-05-01 VITALS
BODY MASS INDEX: 45.99 KG/M2 | TEMPERATURE: 98.4 F | RESPIRATION RATE: 20 BRPM | HEIGHT: 67 IN | DIASTOLIC BLOOD PRESSURE: 74 MMHG | SYSTOLIC BLOOD PRESSURE: 124 MMHG | HEART RATE: 79 BPM | WEIGHT: 293 LBS | OXYGEN SATURATION: 99 %

## 2025-05-01 DIAGNOSIS — K59.09 CHRONIC CONSTIPATION: Primary | ICD-10-CM

## 2025-05-01 DIAGNOSIS — E66.813 CLASS 3 OBESITY: Primary | ICD-10-CM

## 2025-05-01 PROCEDURE — 99214 OFFICE O/P EST MOD 30 MIN: CPT | Performed by: PHYSICIAN ASSISTANT

## 2025-05-01 NOTE — ASSESSMENT & PLAN NOTE
-Patient is pursuing Conservative Program  -Initial weight loss goal of 5-10% weight loss for improved health- met  -Screening labs: reviewed, up to date  -dietary/lifestyle changes, continue to work with [facility]  -On Topamax for headaches  -No longer on Wellbutrin for mood  -avoid Phentermine  -Previously on Ozempic and Wegovy. Currently on Zepbound 15mg dose. Just increased Dose   -She will work on increasing water to goal, reducing artificially sweetened drinks and increasing fruit and veggies. Reviewed protein goals  -medication agreement signed    Initial: 421 lbs  Last OV: 339.2 lbs  Current: 341.2 lbs  Change: -79.8 lbs  Goal: wants to feel healthy

## 2025-05-01 NOTE — PROGRESS NOTES
Assessment/Plan:    Class 3 obesity  -Patient is pursuing Conservative Program  -Initial weight loss goal of 5-10% weight loss for improved health- met  -Screening labs: reviewed, up to date  -dietary/lifestyle changes, continue to work with BH  -On Topamax for headaches  -No longer on Wellbutrin for mood  -avoid Phentermine  -Previously on Ozempic and Wegovy. Currently on Zepbound 15mg dose. Just increased Dose   -She will work on increasing water to goal, reducing artificially sweetened drinks and increasing fruit and veggies. Reviewed protein goals  -medication agreement signed    Initial: 421 lbs  Last OV: 339.2 lbs  Current: 341.2 lbs  Change: -79.8 lbs  Goal: wants to feel healthy        Goals:    Food log (ie.) [email or web address],etc.   No sugary beverages. At least 64oz of water daily.  Increase physical activity by 10 minutes daily. Gradually increase physical activity to a goal of 5 days per week for 30 minutes of MODERATE intensity PLUS 2 days per week of FULL BODY resistance training  5-10 servings of fruits and vegetables per day  25-35 grams of dietary fiber per day    Follow up in approximately  4 months  with Non-Surgical Physician/Advanced Practitioner.     Diagnoses and all orders for this visit:    Class 3 obesity    BMI 50.0-59.9, adult ([facility])          Subjective:   Chief Complaint   Patient presents with    Follow-up        Patient ID: Gita Rendon  is a 39 y.o. female with excess weight/obesity here to pursue weight managment.  Patient is pursuing Conservative Program.     HPI Patient presents for MW follow up. Currently on Zepbound 12.5mg and tolearting well. She does have 15mg dose to start when finishes up the 12.5mg dose. Feels very fatigued. Was on steroids off an on since last visit for headaches    Will be seeing sleep medicine in July    Reports he daily headaches affecting her mood and affect her activity level. She does not fee like  "doing much. She will be hospitalized at [facility] for treatment of her chronic migraines    B: peanut butter toast  L: sandwich  D: veggie + protein + carb    Hydration: water minimal to none; diet decaf iced tea, 1 cup coffee + flavored creamer  Exercise: denies  SLeep: sleeping a lot due to her headaches    Wt Readings from Last 10 Encounters:   05/05/25 (!) 154 kg (340 lb)   05/01/25 (!) 155 kg (341 lb 3.2 oz)   04/21/25 (!) 156 kg (343 lb 3.2 oz)   04/17/25 (!) 156 kg (343 lb)   04/07/25 (!) 156 kg (344 lb 12.8 oz)   03/28/25 (!) 153 kg (338 lb)   03/13/25 (!) 152 kg (335 lb 1.6 oz)   03/04/25 (!) 152 kg (335 lb)   02/13/25 (!) 152 kg (334 lb 8 oz)   01/30/25 62.4 kg (137 lb 9.1 oz)          The following portions of the patient's history were reviewed and updated as appropriate: allergies, current medications, past family history, past medical history, past social history, past surgical history, and problem list.    Review of Systems   Cardiovascular: Negative.    Gastrointestinal: Negative.        Objective:    /74 (BP Location: Left arm, Patient Position: Sitting, Cuff Size: Large)   Pulse 79   Temp 98.4 °F (36.9 °C) (Temporal)   Resp 20   Ht 5' 6.5\" (1.689 m)   Wt (!) 155 kg (341 lb 3.2 oz)   LMP 02/21/2025 (Approximate)   SpO2 99%   BMI 54.25 kg/m²      Physical Exam  Vitals and nursing note reviewed.   Constitutional:       General: She is not in acute distress.     Appearance: She is obese. She is not ill-appearing or toxic-appearing.   HENT:      Head: Normocephalic and atraumatic.      Nose: Nose normal.      Mouth/Throat:      Mouth: Mucous membranes are moist.   Eyes:      General: No scleral icterus.  Pulmonary:      Effort: Pulmonary effort is normal. No respiratory distress.   Abdominal:      Comments: protuberant   Musculoskeletal:         General: Normal range of motion.   Skin:     General: Skin is dry.   Neurological:      General: No focal deficit present.      Mental " Status: She is alert and oriented to person, place, and time. Mental status is at baseline.   Psychiatric:         Behavior: Behavior normal.         Thought Content: Thought content normal.         Judgment: Judgment normal.

## 2025-05-02 ENCOUNTER — PATIENT MESSAGE (OUTPATIENT)
Dept: GASTROENTEROLOGY | Facility: CLINIC | Age: 40
End: 2025-05-02

## 2025-05-02 RX ORDER — LACTULOSE 10 G/15ML
20 SOLUTION ORAL 2 TIMES DAILY
Qty: 946 ML | Refills: 1 | Status: SHIPPED | OUTPATIENT
Start: 2025-05-02

## 2025-05-04 ENCOUNTER — NURSE TRIAGE (OUTPATIENT)
Dept: OTHER | Facility: OTHER | Age: 40
End: 2025-05-04

## 2025-05-04 NOTE — TELEPHONE ENCOUNTER
"FOLLOW UP: None    REASON FOR CONVERSATION: Medication Problem    SYMPTOMS: \"horrendous gas\", mild abdominal pain and nausea. Started Lactulose 5/3    ESC sent to on call provider who indicated   Lactulose can certainly cause bloating and gas     OTHER: None    DISPOSITION: Call PCP Now      Reason for Disposition   [1] Caller has URGENT medicine question about med that PCP or specialist prescribed AND [2] triager unable to answer question    Answer Assessment - Initial Assessment Questions  1. NAME of MEDICINE: \"What medicine(s) are you calling about?\"      Lactulose    2. QUESTION: \"What is your question?\" (e.g., double dose of medicine, side effect)      Having \"horrendous gas\", mild nausea and abdominal pain. Are these side effects of the lactulose    3. PRESCRIBER: \"Who prescribed the medicine?\" Reason: if prescribed by specialist, call should be referred to that group.      GI Provider    Protocols used: Medication Question Call-Adult-    "

## 2025-05-05 ENCOUNTER — OFFICE VISIT (OUTPATIENT)
Dept: NEPHROLOGY | Facility: CLINIC | Age: 40
End: 2025-05-05
Payer: COMMERCIAL

## 2025-05-05 VITALS
BODY MASS INDEX: 45.99 KG/M2 | TEMPERATURE: 98.2 F | OXYGEN SATURATION: 99 % | HEART RATE: 67 BPM | DIASTOLIC BLOOD PRESSURE: 83 MMHG | WEIGHT: 293 LBS | SYSTOLIC BLOOD PRESSURE: 131 MMHG | HEIGHT: 67 IN | RESPIRATION RATE: 16 BRPM

## 2025-05-05 DIAGNOSIS — E87.20 ACIDOSIS: ICD-10-CM

## 2025-05-05 DIAGNOSIS — G93.2 IDIOPATHIC INTRACRANIAL HYPERTENSION: Primary | ICD-10-CM

## 2025-05-05 DIAGNOSIS — N32.81 OVERACTIVE BLADDER: ICD-10-CM

## 2025-05-05 DIAGNOSIS — I10 PRIMARY HYPERTENSION: Chronic | ICD-10-CM

## 2025-05-05 PROCEDURE — 99213 OFFICE O/P EST LOW 20 MIN: CPT | Performed by: NURSE PRACTITIONER

## 2025-05-05 NOTE — PROGRESS NOTES
[facility]   Office Follow-Up  Gita Rendon 39 y.o. female MRN: [id number]    Encounter: [id number]        Assessment & Plan    Gita Rendon was seen in the [facility] office today. All diagnoses and orders for visit:     Assessment & Plan  Idiopathic intracranial hypertension  Under the care of [facility]. Planned for admission to multidisciplinary program  Currently carbonic anhydrase therapy with Diamox 500 mg BID. Additionally taking topirimate 150 mg BID  Previously on lasix due to side effect from Diamox (xerostomia) however this was not as effective for her and Diamox was reintroduced 11/2023  Attempted uptitration Diamox was not tolerated  tCO2 trends fairly acceptable. No need for bicarb yet. Continue to follow [facility]      Primary hypertension  Per [facility] documentation, attempted candesartan but could not tolerate due to fatigue- stopped March 2025   Previously dx hyperaldosteronism. After several weeks washout from spironolactone levels rechecked: aldosterone 11.2, renin 1.39, ARR 8  BP stable off spironolactone. Will continue to hold for now. Had one recent episode hyperkalemia unclear if accurate    Overactive bladder  Encouraged her to revisit idea of pelvic floor PT. Location is a barrier. Following locally with Urology. Tentative for cysto  Also consideration towards polyuria second to hx lithium use however patient does not feel she could provide 24 hour urine sample given incontinence and additionally Diamox may be helpful with nephrogenic DI   Acidosis  On Diamox and Topirimate both of which are carbonic anhydrase inhibitors and can lead to metabolic acidosis. Tco2 stable at this time and she has been able to maintain without sodium bicarbonate or bicitra. Continue to monitor serial bmp        HPI: Gita Rendon is a 39 y.o. female who is here for scheduled follow-up    Pertinent medical problems include: HTN, migraine, IIH, anxiety,  depression, CAIO, obesity.     [place] [facility] recommending inpatient admission to multidisciplinary headache program. Patient obtaining insurance approval.        ROS:   Review of Systems   Constitutional:  Positive for fatigue. Negative for chills and fever.        Does not have a headache today but most days has headache   HENT:  Negative for ear pain and sore throat.    Eyes:  Negative for pain and visual disturbance.   Respiratory:  Negative for cough and shortness of breath.    Cardiovascular:  Negative for chest pain and palpitations.   Gastrointestinal:  Negative for abdominal pain and vomiting.   Genitourinary:  Positive for enuresis, frequency and urgency. Negative for dysuria and hematuria.   Musculoskeletal:  Negative for arthralgias and back pain.   Skin:  Negative for color change and rash.   Neurological:  Positive for weakness. Negative for seizures and syncope.   All other systems reviewed and are negative.      Allergies: Doxycycline and Other    Medications:   Current Outpatient Medications:     acetaZOLAMIDE (DIAMOX) 250 mg tablet, Take 2 tablets (500 mg total) by mouth 2 (two) times a day, Disp: , Rfl:     Biotin 5000 MCG CAPS, Take by mouth in the morning, Disp: , Rfl:     cariprazine (VRAYLAR) 3 MG capsule, Take 3 mg by mouth daily at bedtime, Disp: , Rfl:     celecoxib (CeleBREX) 200 mg capsule, Take 1 capsule (200 mg total) by mouth 2 (two) times a day, Disp: 60 capsule, Rfl: 0    Cequa 0.09 % SOLN, Apply 1 drop to eye 2 (two) times a day, Disp: 60 each, Rfl: 0    cetirizine (ZyrTEC) 10 MG chewable tablet, Chew 10 mg daily, Disp: , Rfl:     Collagen-Vitamin C-Biotin (COLLAGEN PO), Take by mouth daily, Disp: , Rfl:     docusate sodium (COLACE) 100 mg capsule, Take 100 mg by mouth 2 (two) times a day, Disp: , Rfl:     fluvoxaMINE (LUVOX) 50 mg tablet, Take 50 mg by mouth daily with breakfast, Disp: , Rfl:     folic acid (FOLVITE) 800 MCG tablet, Take 800 mcg by mouth daily, Disp: , Rfl:      gabapentin (NEURONTIN) 300 mg capsule, Take 1 capsule (300 mg total) by mouth daily after breakfast, Disp: , Rfl:     gabapentin (NEURONTIN) 600 MG tablet, Take 1.5 tablets (900 mg total) by mouth daily at bedtime, Disp: 45 tablet, Rfl: 0    lactulose (CHRONULAC) 10 g/15 mL solution, Take 30 mL (20 g total) by mouth 2 (two) times a day, Disp: 946 mL, Rfl: 1    levothyroxine 112 mcg tablet, Take 1 tablet (112 mcg total) by mouth daily in the early morning, Disp: 90 tablet, Rfl: 1    lubiprostone (AMITIZA) 24 mcg capsule, Take 1 capsule (24 mcg total) by mouth 2 (two) times a day with meals, Disp: 60 capsule, Rfl: 5    metFORMIN (GLUCOPHAGE) 500 mg tablet, Take 1 tablet (500 mg total) by mouth daily with breakfast, Disp: 90 tablet, Rfl: 1    Multiple Vitamin (MULTIVITAMIN) capsule, Take 1 capsule by mouth daily, Disp: , Rfl:     naratriptan (AMERGE) 2.5 MG tablet, take 1 tablet by mouth AT ONSET OF MIGRAINE may repeat in 2 hours...  (REFER TO PRESCRIPTION NOTES)., Disp: , Rfl:     Nurtec 75 MG TBDP, Take 75 mg by mouth every other day, Disp: , Rfl:     omeprazole (PriLOSEC) 20 mg delayed release capsule, Take 1 capsule (20 mg total) by mouth 2 (two) times a day, Disp: 180 capsule, Rfl: 1    Riboflavin 100 MG TABS, , Disp: , Rfl:     tirzepatide (Zepbound) 15 mg/0.5 mL auto-injector, Inject 0.5 mL (15 mg total) under the skin once a week, Disp: 2 mL, Rfl: 3    topiramate (TOPAMAX) 100 mg tablet, Take 150 mg by mouth 2 (two) times a day, Disp: , Rfl:     Vibegron 75 MG TABS, Take 75 mg by mouth in the morning, Disp: 30 tablet, Rfl: 3    cariprazine (Vraylar) 6 MG capsule, Take 1 capsule (6 mg total) by mouth daily, Disp: 30 capsule, Rfl: 0    cyclobenzaprine (FLEXERIL) 10 mg tablet, Take 1 tablet (10 mg total) by mouth 2 (two) times a day as needed for muscle spasms (Headaches) (Patient not taking: Reported on 4/21/2025), Disp: 20 tablet, Rfl: 0    DULoxetine (CYMBALTA) 60 mg delayed release capsule, Take 2 capsules  (120 mg total) by mouth daily, Disp: 60 capsule, Rfl: 0    Past Medical History:   Diagnosis Date    ADHD (attention deficit hyperactivity disorder)     Allergic     Allergic rhinitis     Anorexia nervosa in remission     Anxiety     Arthritis 5/8/2014    Back pain     Bipolar disorder (HCC)     Chronic diarrhea     Chronic pain disorder     Depression     Dermatitis 10/16/2021    Disease of thyroid gland     Diverticulitis of colon 9/20/2015    Dizziness     Ear problems     Eating disorder     Environmental and seasonal allergies     Esophageal reflux 08/15/2013    GERD (gastroesophageal reflux disease) 8/15/2013    Headache(784.0)     Headache, tension-type     Hypertension     Hypothyroid     Idiopathic intracranial hypertension     Impulse control disorder     Lumbar degenerative disc disease 05/08/2014    Memory loss     Migraine     Nasal congestion     Nosebleed     Obesity     Obsessive-compulsive disorder     Osteoarthritis 5/8/2014    Otitis media     Overactive bladder     Panic attack     Psychiatric disorder     Psychiatric illness     PTSD (post-traumatic stress disorder)     Restless leg syndrome, controlled     Seasonal allergies     Self-injurious behavior     Sinus problem     Sinusitis     Sleep apnea     Sleep difficulties     Suicide and self-inflicted injury (HCC)     Suicide attempt (HCC)     Tinnitus     TMJ dysfunction     Tonsillitis     Transcranial Magnetic Stimulation 09/06/2021    Urinary incontinence     Urinary tract infection     Vision loss      Past Surgical History:   Procedure Laterality Date    COLONOSCOPY  10/5/2015    DENTAL SURGERY      wisdom teeth-at [age] years. Other surgery dental extraxtion of bone spur (10 years ago)    IR LUMBAR PUNCTURE  02/26/2019    SINUS ENDOSCOPY      SINUS SURGERY      TONSILLECTOMY       Family History   Problem Relation Age of Onset    Mental illness Mother     Depression Mother     Suicide Attempts Mother     Hypertension Mother     Diabetes  "Mother     Anxiety disorder Mother     Psychiatric Illness Mother     Schizoaffective Disorder  Mother     Anemia Mother     Colon polyps Mother     Transient ischemic attack Mother     Snoring Mother     Hypertension Father     Hypothyroidism Father     Thyroid disease Father     Sudden death Father     Psoriasis Father     Heart disease Father     Hypertension Brother     Cancer Maternal Grandmother     Heart disease Maternal Grandmother     Stroke Maternal Grandmother     Breast cancer Maternal Grandmother     Skin cancer Maternal Grandmother     Arthritis Maternal Grandmother     Pneumonia Maternal Grandfather     Cancer Maternal Grandfather     Dementia Maternal Grandfather     COPD Maternal Grandfather     Hearing loss Maternal Grandfather     Cancer Paternal Grandmother     Pneumonia Paternal Grandfather     Arthritis Family     Breast cancer Family     Osteopenia Family     Osteoporosis Family     Hypertension Family     Transient ischemic attack Family       reports that she has never smoked. She has never been exposed to tobacco smoke. She has never used smokeless tobacco. She reports that she does not drink alcohol and does not use drugs.      Physical Exam:   Vitals:    05/05/25 1330   BP: 131/83   Patient Position: Sitting   Cuff Size: Standard   Pulse: 67   Resp: 16   Temp: 98.2 °F (36.8 °C)   TempSrc: Temporal   SpO2: 99%   Weight: (!) 154 kg (340 lb)   Height: 5' 6.5\" (1.689 m)     Body mass index is 54.06 kg/m².    General: conscious, cooperative, in no acute distress, appears stated age  Eyes: conjunctivae pale, anicteric sclerae  ENT: lips and mucous membranes moist  Neck: supple, no JVD, no masses  Chest:  essentially clear breath sounds bilaterally, no crackles, ronchus or wheezings  CVS: S1 & S2, normal rate, regular rhythm  Abdomen: soft, non-tender, non-distended, normoactive bowel sounds, rounded obese  Extremities: no edema of both legs  Skin: no rash   Neuro: awake, alert, oriented " "      Diagnostic Data:  Lab: I have personally reviewed pertinent lab results.,   CBC:       CMP: No results found for: \"SODIUM\", \"K\", \"CL\", \"CO2\", \"ANIONGAP\", \"BUN\", \"CREATININE\", \"GLUCOSE\", \"CALCIUM\", \"AST\", \"ALT\", \"ALKPHOS\", \"PROT\", \"BILITOT\", \"EGFR\",   PT/INR: No results found for: \"PT\", \"INR\",   Magnesium: No components found for: \"MAG\",  Phosphorous: No results found for: \"PHOS\"    There are no Patient Instructions on file for this visit.    Portions of the record may have been created with voice recognition software. Occasional wrong word or \"sound a like\" substitutions may have occurred due to the inherent limitations of voice recognition software. Read the chart carefully and recognize, using context, where substitutions have occurred.    If you have any questions, please contact the dictating provider.  "

## 2025-05-06 NOTE — ASSESSMENT & PLAN NOTE
Encouraged her to revisit idea of pelvic floor PT. Location is a barrier. Following locally with Urology. Tentative for cysto  Also consideration towards polyuria second to hx lithium use however patient does not feel she could provide 24 hour urine sample given incontinence and additionally Diamox may be helpful with nephrogenic DI

## 2025-05-06 NOTE — ASSESSMENT & PLAN NOTE
Per [facility] documentation, attempted candesartan but could not tolerate due to fatigue- stopped March 2025   Previously dx hyperaldosteronism. After several weeks washout from spironolactone levels rechecked: aldosterone 11.2, renin 1.39, ARR 8  BP stable off spironolactone. Will continue to hold for now. Had one recent episode hyperkalemia unclear if accurate

## 2025-05-08 ENCOUNTER — NURSE TRIAGE (OUTPATIENT)
Dept: OTHER | Facility: OTHER | Age: 40
End: 2025-05-08

## 2025-05-08 NOTE — TELEPHONE ENCOUNTER
"Answer Assessment - Initial Assessment Questions  1. LOCATION: \"Where does it hurt?\"       Mostly frontal    2. ONSET: \"When did the headache start?\" (e.g., minutes, hours, days)       Lastnight    3. PATTERN: \"Does the pain come and go, or has it been constant since it started?\"      Constant    4. SEVERITY: \"How bad is the pain?\" and \"What does it keep you from doing?\"  (e.g., Scale 1-10; mild, moderate, or severe)      7/10 pain    5. RECURRENT SYMPTOM: \"Have you ever had headaches before?\" If Yes, ask: \"When was the last time?\" and \"What happened that time?\"       Yes, patient states she has hx of migraines    6. CAUSE: \"What do you think is causing the headache?\"      Unsure    7. MIGRAINE: \"Have you been diagnosed with migraine headaches?\" If Yes, ask: \"Is this headache similar?\"       Yes    8. HEAD INJURY: \"Has there been any recent injury to the head?\"       No    9. OTHER SYMPTOMS: \"Do you have any other symptoms?\" (e.g., fever, stiff neck, eye pain, sore throat, cold symptoms)      Patient feels really tired and \"run down\", no energy, nausea    10. PREGNANCY: \"Is there any chance you are pregnant?\" \"When was your last menstrual period?\"        No    Protocols used: Headache-Adult-AH    "

## 2025-05-08 NOTE — TELEPHONE ENCOUNTER
FOLLOW UP: Yes, patient advised to go to ED for evaluation due to visual changes    REASON FOR CONVERSATION: Migraine    SYMPTOMS: headache with visual changes    OTHER: N/A    DISPOSITION: See [facility] Within 4 Hours (Or PCP Triage)        Reason for Disposition   [1] SEVERE headache (e.g., excruciating) AND [2] not improved after 2 hours of pain medicine    Protocols used: Headache-Adult-AH

## 2025-05-08 NOTE — TELEPHONE ENCOUNTER
"Regarding: moderate headache/nausea  ----- Message from Ita RUIZ sent at 5/8/2025  6:43 PM EDT -----  Pt stated, \"I am experiencing a moderate headache and nausea.  I get headaches a lot and have an appointment on Monday at [facility] at the headache unit. My PCP is with  [facility]. Is there anything I can do for the pain now?\"    "

## 2025-05-09 ENCOUNTER — HOSPITAL ENCOUNTER (EMERGENCY)
Facility: HOSPITAL | Age: 40
Discharge: HOME | End: 2025-05-09
Attending: EMERGENCY MEDICINE
Payer: COMMERCIAL

## 2025-05-09 VITALS
OXYGEN SATURATION: 100 % | RESPIRATION RATE: 18 BRPM | SYSTOLIC BLOOD PRESSURE: 138 MMHG | HEART RATE: 65 BPM | TEMPERATURE: 97.5 F | DIASTOLIC BLOOD PRESSURE: 82 MMHG

## 2025-05-09 DIAGNOSIS — R51.9 HEADACHE: Primary | ICD-10-CM

## 2025-05-09 DIAGNOSIS — E16.2 HYPOGLYCEMIA: ICD-10-CM

## 2025-05-09 LAB
ALBUMIN SERPL BCG-MCNC: 3.9 G/DL (ref 3.5–5)
ALP SERPL-CCNC: 72 U/L (ref 34–104)
ALT SERPL W P-5'-P-CCNC: 11 U/L (ref 7–52)
ANION GAP SERPL CALCULATED.3IONS-SCNC: 8 MMOL/L (ref 4–13)
AST SERPL W P-5'-P-CCNC: 12 U/L (ref 13–39)
BASOPHILS # BLD AUTO: 0.08 THOUSANDS/ÂΜL (ref 0–0.1)
BASOPHILS NFR BLD AUTO: 1 % (ref 0–1)
BILIRUB SERPL-MCNC: 0.22 MG/DL (ref 0.2–1)
BUN SERPL-MCNC: 20 MG/DL (ref 5–25)
CALCIUM SERPL-MCNC: 8.7 MG/DL (ref 8.4–10.2)
CHLORIDE SERPL-SCNC: 112 MMOL/L (ref 96–108)
CO2 SERPL-SCNC: 22 MMOL/L (ref 21–32)
CREAT SERPL-MCNC: 0.77 MG/DL (ref 0.6–1.3)
EOSINOPHIL # BLD AUTO: 0.47 THOUSAND/ÂΜL (ref 0–0.61)
EOSINOPHIL NFR BLD AUTO: 4 % (ref 0–6)
ERYTHROCYTE [DISTWIDTH] IN BLOOD BY AUTOMATED COUNT: 13.8 % (ref 11.6–15.1)
EXT PREGNANCY TEST URINE: NEGATIVE
EXT. CONTROL: NORMAL
GFR SERPL CREATININE-BSD FRML MDRD: 96 ML/MIN/1.73SQ M
GLUCOSE SERPL-MCNC: 49 MG/DL (ref 65–140)
GLUCOSE SERPL-MCNC: 59 MG/DL (ref 65–140)
GLUCOSE SERPL-MCNC: 67 MG/DL (ref 65–140)
HCT VFR BLD AUTO: 42.7 % (ref 34.8–46.1)
HGB BLD-MCNC: 13.1 G/DL (ref 11.5–15.4)
IMM GRANULOCYTES # BLD AUTO: 0.04 THOUSAND/UL (ref 0–0.2)
IMM GRANULOCYTES NFR BLD AUTO: 0 % (ref 0–2)
LYMPHOCYTES # BLD AUTO: 2.93 THOUSANDS/ÂΜL (ref 0.6–4.47)
LYMPHOCYTES NFR BLD AUTO: 26 % (ref 14–44)
MAGNESIUM SERPL-MCNC: 2 MG/DL (ref 1.9–2.7)
MCH RBC QN AUTO: 27.8 PG (ref 26.8–34.3)
MCHC RBC AUTO-ENTMCNC: 30.7 G/DL (ref 31.4–37.4)
MCV RBC AUTO: 91 FL (ref 82–98)
MONOCYTES # BLD AUTO: 0.82 THOUSAND/ÂΜL (ref 0.17–1.22)
MONOCYTES NFR BLD AUTO: 7 % (ref 4–12)
NEUTROPHILS # BLD AUTO: 7 THOUSANDS/ÂΜL (ref 1.85–7.62)
NEUTS SEG NFR BLD AUTO: 62 % (ref 43–75)
NRBC BLD AUTO-RTO: 0 /100 WBCS
PLATELET # BLD AUTO: 290 THOUSANDS/UL (ref 149–390)
PMV BLD AUTO: 11 FL (ref 8.9–12.7)
POTASSIUM SERPL-SCNC: 3.6 MMOL/L (ref 3.5–5.3)
PROT SERPL-MCNC: 6.1 G/DL (ref 6.4–8.4)
RBC # BLD AUTO: 4.71 MILLION/UL (ref 3.81–5.12)
SODIUM SERPL-SCNC: 142 MMOL/L (ref 135–147)
WBC # BLD AUTO: 11.34 THOUSAND/UL (ref 4.31–10.16)

## 2025-05-09 PROCEDURE — 36415 COLL VENOUS BLD VENIPUNCTURE: CPT | Performed by: EMERGENCY MEDICINE

## 2025-05-09 PROCEDURE — 80053 COMPREHEN METABOLIC PANEL: CPT | Performed by: EMERGENCY MEDICINE

## 2025-05-09 PROCEDURE — 81025 URINE PREGNANCY TEST: CPT | Performed by: EMERGENCY MEDICINE

## 2025-05-09 PROCEDURE — 99284 EMERGENCY DEPT VISIT MOD MDM: CPT | Performed by: EMERGENCY MEDICINE

## 2025-05-09 PROCEDURE — 83735 ASSAY OF MAGNESIUM: CPT | Performed by: EMERGENCY MEDICINE

## 2025-05-09 PROCEDURE — 85025 COMPLETE CBC W/AUTO DIFF WBC: CPT | Performed by: EMERGENCY MEDICINE

## 2025-05-09 PROCEDURE — 96365 THER/PROPH/DIAG IV INF INIT: CPT

## 2025-05-09 PROCEDURE — 82948 REAGENT STRIP/BLOOD GLUCOSE: CPT

## 2025-05-09 PROCEDURE — 99283 EMERGENCY DEPT VISIT LOW MDM: CPT

## 2025-05-09 PROCEDURE — 96375 TX/PRO/DX INJ NEW DRUG ADDON: CPT

## 2025-05-09 PROCEDURE — 96366 THER/PROPH/DIAG IV INF ADDON: CPT

## 2025-05-09 RX ORDER — MAGNESIUM SULFATE HEPTAHYDRATE 40 MG/ML
2 INJECTION, SOLUTION INTRAVENOUS ONCE
Status: COMPLETED | OUTPATIENT
Start: 2025-05-09 | End: 2025-05-09

## 2025-05-09 RX ORDER — DIPHENHYDRAMINE HYDROCHLORIDE 50 MG/ML
25 INJECTION, SOLUTION INTRAMUSCULAR; INTRAVENOUS ONCE
Status: COMPLETED | OUTPATIENT
Start: 2025-05-09 | End: 2025-05-09

## 2025-05-09 RX ORDER — METOCLOPRAMIDE HYDROCHLORIDE 5 MG/ML
10 INJECTION INTRAMUSCULAR; INTRAVENOUS ONCE
Status: COMPLETED | OUTPATIENT
Start: 2025-05-09 | End: 2025-05-09

## 2025-05-09 RX ADMIN — DIPHENHYDRAMINE HYDROCHLORIDE 25 MG: 50 INJECTION, SOLUTION INTRAMUSCULAR; INTRAVENOUS at 19:09

## 2025-05-09 RX ADMIN — MAGNESIUM SULFATE HEPTAHYDRATE 2 G: 2 INJECTION, SOLUTION INTRAVENOUS at 19:10

## 2025-05-09 RX ADMIN — SODIUM CHLORIDE 1000 ML: 0.9 INJECTION, SOLUTION INTRAVENOUS at 19:06

## 2025-05-09 RX ADMIN — METOCLOPRAMIDE 10 MG: 5 INJECTION, SOLUTION INTRAMUSCULAR; INTRAVENOUS at 19:08

## 2025-05-09 NOTE — ED PROVIDER NOTES
Time reflects when diagnosis was documented in both MDM as applicable and the Disposition within this note       Time User Action Codes Description Comment    5/9/2025 10:23 PM Lee Ann Batista [R51.9] Headache     5/9/2025 10:23 PM Lee Ann Batista [E16.2] Hypoglycemia           ED Disposition       ED Disposition   Discharge    Condition   Stable    Date/Time   Fri May 9, 2025 10:24 PM    Comment   Gita UNIQUE Rendon discharge to home/self care.                   Assessment & Plan       Medical Decision Making  39-year-old female with history of intracranial hypertension on Diamox 500 mg twice daily chronic migraines on topiramate 150 mg daily presents with headache since Wednesday.  This is consistent with her prior headache pattern.  She is scheduled for the disciplinary evaluation at [facility] in 3 days.  Patient neurologic exam is nonfocal.  There is no history of trauma or falls presentation not consistent with infectious findings will initiate symptomatic management with IV fluids Reglan Benadryl magnesium will check urine pregnancy test and administer Toradol check electrolytes at this time neuroimaging is not planned patient is comfortable with this    Amount and/or Complexity of Data Reviewed  Labs: ordered.    Risk  Prescription drug management.        ED Course as of 05/10/25 0012   Fri May 09, 2025   1943 Patient given grape juice for glucose of 49 assx   2129 Recheck blood sugar 49; patient given a snack headache is improved she is both on metformin and Zepbound for weight loss   2219 Recheck glucose is 67.  Patient is due to take her next dose of Zepbound and 5 days.  Recommend she hold metformin then and be in contact with her weight management team.  Reviewed signs and symptoms of hypoglycemia headache is better requesting discharge       Medications   sodium chloride 0.9 % bolus 1,000 mL (0 mL Intravenous Stopped 5/9/25 2112)   magnesium sulfate 2 g/50 mL IVPB (premix) 2 g (0 g  Intravenous Stopped 5/9/25 2112)   metoclopramide (REGLAN) injection 10 mg (10 mg Intravenous Given 5/9/25 1908)   diphenhydrAMINE (BENADRYL) injection 25 mg (25 mg Intravenous Given 5/9/25 1909)       ED Risk Strat Scores         Stroke Assessment       Row Name 05/09/25 1947             NIH Stroke Scale    Interval Baseline      Level of Consciousness (1a.) 0      LOC Questions (1b.) 0      LOC Commands (1c.) 0      Best Gaze (2.) 0      Visual (3.) 0      Facial Palsy (4.) 0      Motor Arm, Left (5a.) 0      Motor Arm, Right (5b.) 0      Motor Leg, Left (6a.) 0      Motor Leg, Right (6b.) 0      Limb Ataxia (7.) 0      Sensory (8.) 0      Best Language (9.) 0      Dysarthria (10.) 0      Extinction and Inattention (11.) (Formerly Neglect) 0      Total 0                    Flowsheet Row Most Recent Value   Thrombolytic Decision Options    Thrombolytic Decision Patient not a candidate.   Patient is not a candidate options Unclear time of onset outside appropriate time window., Symptoms resolved/clearly non disabling.                    No data recorded        SBIRT 22yo+      Flowsheet Row Most Recent Value   Initial Alcohol Screen: US AUDIT-C     1. How often do you have a drink containing alcohol? 0 Filed at: 05/09/2025 1755   2. How many drinks containing alcohol do you have on a typical day you are drinking?  0 Filed at: 05/09/2025 1755   3b. FEMALE Any Age, or MALE 65+: How often do you have 4 or more drinks on one occassion? 0 Filed at: 05/09/2025 1755   Audit-C Score 0 Filed at: 05/09/2025 1755   JENNIFER: How many times in the past year have you...    Used an illegal drug or used a prescription medication for non-medical reasons? Never Filed at: 05/09/2025 1755                            History of Present Illness       Chief Complaint   Patient presents with    Headache     Headache since Wednesday night. Has a history of migraine. Complains of nausea for the past week.        Past Medical History:   Diagnosis  Date    ADHD (attention deficit hyperactivity disorder)     Allergic     Allergic rhinitis     Anorexia nervosa in remission     Anxiety     Arthritis 5/8/2014    Back pain     Bipolar disorder (HCC)     Chronic diarrhea     Chronic pain disorder     Depression     Dermatitis 10/16/2021    Disease of thyroid gland     Diverticulitis of colon 9/20/2015    Dizziness     Ear problems     Eating disorder     Environmental and seasonal allergies     Esophageal reflux 08/15/2013    GERD (gastroesophageal reflux disease) 8/15/2013    Headache(784.0)     Headache, tension-type     Hypertension     Hypothyroid     Idiopathic intracranial hypertension     Impulse control disorder     Lumbar degenerative disc disease 05/08/2014    Memory loss     Migraine     Nasal congestion     Nosebleed     Obesity     Obsessive-compulsive disorder     Osteoarthritis 5/8/2014    Otitis media     Overactive bladder     Panic attack     Psychiatric disorder     Psychiatric illness     PTSD (post-traumatic stress disorder)     Restless leg syndrome, controlled     Seasonal allergies     Self-injurious behavior     Sinus problem     Sinusitis     Sleep apnea     Sleep difficulties     Suicide and self-inflicted injury (HCC)     Suicide attempt (HCC)     Tinnitus     TMJ dysfunction     Tonsillitis     Transcranial Magnetic Stimulation 09/06/2021    Urinary incontinence     Urinary tract infection     Vision loss       Past Surgical History:   Procedure Laterality Date    COLONOSCOPY  10/5/2015    DENTAL SURGERY      wisdom teeth-at [age] years. Other surgery dental extraxtion of bone spur (10 years ago)    IR LUMBAR PUNCTURE  02/26/2019    SINUS ENDOSCOPY      SINUS SURGERY      TONSILLECTOMY        Family History   Problem Relation Age of Onset    Mental illness Mother     Depression Mother     Suicide Attempts Mother     Hypertension Mother     Diabetes Mother     Anxiety disorder Mother     Psychiatric Illness Mother     Schizoaffective  Disorder  Mother     Anemia Mother     Colon polyps Mother     Transient ischemic attack Mother     Snoring Mother     Hypertension Father     Hypothyroidism Father     Thyroid disease Father     Sudden death Father     Psoriasis Father     Heart disease Father     Hypertension Brother     Cancer Maternal Grandmother     Heart disease Maternal Grandmother     Stroke Maternal Grandmother     Breast cancer Maternal Grandmother     Skin cancer Maternal Grandmother     Arthritis Maternal Grandmother     Pneumonia Maternal Grandfather     Cancer Maternal Grandfather     Dementia Maternal Grandfather     COPD Maternal Grandfather     Hearing loss Maternal Grandfather     Cancer Paternal Grandmother     Pneumonia Paternal Grandfather     Arthritis Family     Breast cancer Family     Osteopenia Family     Osteoporosis Family     Hypertension Family     Transient ischemic attack Family       Social History     Tobacco Use    Smoking status: Never     Passive exposure: Never    Smokeless tobacco: Never   Vaping Use    Vaping status: Never Used   Substance Use Topics    Alcohol use: Never    Drug use: Never      E-Cigarette/Vaping    E-Cigarette Use Never User       E-Cigarette/Vaping Substances    Nicotine No     THC No     CBD No     Flavoring No     Other No     Unknown No       I have reviewed and agree with the history as documented.     39-year-old female with history of idiopathic intracranial hypertension on Diamox hypertension OCD hypothyroidism PTSD anxiety back pain chronic migraines presents with a headache.  She states this is in its typical distribution of sides of the head and [place] behind the eyes and the back of the neck she denies any fever chills cough or upper respiratory complaints there is no history of trauma or fall she denies any disturbance she does have photophobia as well as phonophobia no numbness or paresthesias except occasionally to the feet which has resolved states she always has sinus issues.   The only new medication is lactulose which triggers some mild nausea she has been nauseated no vomiting no chest pain or shortness of breath no problems with balance or dizziness.  Patient is scheduled for [facility] multidisciplinary evaluation of her headaches in 3 days  PSHx sinus surgery IR LP tonsillectomy and colonoscopy        Review of Systems   Constitutional:  Positive for activity change. Negative for appetite change, chills, diaphoresis, fatigue and fever.   HENT:  Negative for congestion, ear pain, rhinorrhea, sneezing and sore throat.    Eyes:  Negative for discharge.   Respiratory:  Negative for cough and shortness of breath.    Cardiovascular:  Negative for chest pain and leg swelling.   Gastrointestinal:  Positive for constipation (chronic unchannged) and nausea. Negative for abdominal pain, blood in stool, diarrhea and vomiting.   Endocrine: Negative for polyuria.   Genitourinary:  Negative for difficulty urinating, dysuria, frequency and urgency.   Musculoskeletal:  Negative for back pain and myalgias.   Skin:  Negative for rash.   Neurological:  Positive for numbness (rt foot) and headaches. Negative for dizziness, syncope, speech difficulty, weakness and light-headedness.   Hematological:  Negative for adenopathy.   Psychiatric/Behavioral:  Negative for confusion.    All other systems reviewed and are negative.          Objective       ED Triage Vitals [05/09/25 1752]   Temperature Pulse Blood Pressure Respirations SpO2 Patient Position - Orthostatic VS   97.8 °F (36.6 °C) 71 131/78 18 99 % Sitting      Temp Source Heart Rate Source BP Location FiO2 (%) Pain Score    Temporal Monitor Right arm -- 7      Vitals      Date and Time Temp Pulse SpO2 Resp BP Pain Score FACES Pain Rating User   05/09/25 2239 97.5 °F (36.4 °C) 65 100 % 18 -- -- -- SS   05/09/25 1815 -- -- -- -- -- 7 -- TB   05/09/25 1800 -- 64 100 % 17 138/82 7 -- TB   05/09/25 1752 97.8 °F (36.6 °C) 71 99 % 18 131/78 7 -- KS             Physical Exam  Vitals and nursing note reviewed.   Constitutional:       General: She is not in acute distress.     Appearance: She is well-developed. She is not ill-appearing, toxic-appearing or diaphoretic.   HENT:      Head: Normocephalic and atraumatic.      Right Ear: Tympanic membrane and external ear normal.      Left Ear: Tympanic membrane and external ear normal.      Nose: Nose normal.      Mouth/Throat:      Mouth: Mucous membranes are moist.      Pharynx: No oropharyngeal exudate or posterior oropharyngeal erythema.   Eyes:      General:         Right eye: No discharge.         Left eye: No discharge.      Extraocular Movements: Extraocular movements intact.      Conjunctiva/sclera: Conjunctivae normal.      Pupils: Pupils are equal, round, and reactive to light.      Comments: No Adrian Long pupils extraocular movements are intact visual fields are intact to direct confrontation   Neck:      Comments: No midline or paraspinous tenderness  Cardiovascular:      Rate and Rhythm: Normal rate and regular rhythm.      Heart sounds: Normal heart sounds.   Pulmonary:      Effort: Pulmonary effort is normal. No respiratory distress.      Breath sounds: Normal breath sounds. No stridor. No wheezing, rhonchi or rales.   Abdominal:      General: Bowel sounds are normal. There is no distension.      Palpations: Abdomen is soft.      Tenderness: There is no abdominal tenderness. There is no right CVA tenderness, left CVA tenderness, guarding or rebound.      Comments: Back no midline or CVA tenderness   Musculoskeletal:         General: No tenderness or deformity. Normal range of motion.      Cervical back: Normal range of motion and neck supple. No rigidity or tenderness.      Right lower leg: No edema.      Left lower leg: No edema.   Lymphadenopathy:      Cervical: No cervical adenopathy.   Skin:     General: Skin is warm and dry.      Capillary Refill: Capillary refill takes less than 2 seconds.    Neurological:      General: No focal deficit present.      Mental Status: She is alert and oriented to person, place, and time.      Cranial Nerves: No cranial nerve deficit.      Sensory: No sensory deficit.      Motor: No weakness or abnormal muscle tone.      Coordination: Coordination normal.      Comments: Gait steady   Psychiatric:         Mood and Affect: Mood normal.         Results Reviewed       Procedure Component Value Units Date/Time    Fingerstick Glucose (POCT) [[id number]]  (Normal) Collected: 05/09/25 2145    Lab Status: Final result Specimen: Blood Updated: 05/09/25 2146     POC Glucose 67 mg/dl     POCT pregnancy, urine [[id number]]  (Normal) Collected: 05/09/25 2143    Lab Status: Final result Updated: 05/09/25 2143     EXT Preg Test, Ur Negative     Control Valid    Fingerstick Glucose (POCT) [[id number]]  (Abnormal) Collected: 05/09/25 2105    Lab Status: Final result Specimen: Blood Updated: 05/09/25 2106     POC Glucose 49 mg/dl     Comprehensive metabolic panel [[id number]]  (Abnormal) Collected: 05/09/25 1904    Lab Status: Final result Specimen: Blood from Arm, Right Updated: 05/09/25 1935     Sodium 142 mmol/L      Potassium 3.6 mmol/L      Chloride 112 mmol/L      CO2 22 mmol/L      ANION GAP 8 mmol/L      BUN 20 mg/dL      Creatinine 0.77 mg/dL      Glucose 59 mg/dL      Calcium 8.7 mg/dL      AST 12 U/L      ALT 11 U/L      Alkaline Phosphatase 72 U/L      Total Protein 6.1 g/dL      Albumin 3.9 g/dL      Total Bilirubin 0.22 mg/dL      eGFR 96 ml/min/1.73sq m     Narrative:      National Kidney Disease Foundation guidelines for Chronic Kidney Disease (CKD):     Stage 1 with normal or high GFR (GFR > 90 mL/min/1.73 square meters)    Stage 2 Mild CKD (GFR = 60-89 mL/min/1.73 square meters)    Stage 3A Moderate CKD (GFR = 45-59 mL/min/1.73 square meters)    Stage 3B Moderate CKD (GFR = 30-44 mL/min/1.73 square meters)    Stage 4 Severe CKD (GFR = 15-29 mL/min/1.73 square meters)    Stage 5  End Stage CKD (GFR <15 mL/min/1.73 square meters)  Note: GFR calculation is accurate only with a steady state creatinine    Magnesium [[id number]]  (Normal) Collected: 05/09/25 1904    Lab Status: Final result Specimen: Blood from Arm, Right Updated: 05/09/25 1935     Magnesium 2.0 mg/dL     CBC and differential [[id number]]  (Abnormal) Collected: 05/09/25 1904    Lab Status: Final result Specimen: Blood from Arm, Right Updated: 05/09/25 1918     WBC 11.34 Thousand/uL      RBC 4.71 Million/uL      Hemoglobin 13.1 g/dL      Hematocrit 42.7 %      MCV 91 fL      MCH 27.8 pg      MCHC 30.7 g/dL      RDW 13.8 %      MPV 11.0 fL      Platelets 290 Thousands/uL      nRBC 0 /100 WBCs      Segmented % 62 %      Immature Grans % 0 %      Lymphocytes % 26 %      Monocytes % 7 %      Eosinophils Relative 4 %      Basophils Relative 1 %      Absolute Neutrophils 7.00 Thousands/µL      Absolute Immature Grans 0.04 Thousand/uL      Absolute Lymphocytes 2.93 Thousands/µL      Absolute Monocytes 0.82 Thousand/µL      Eosinophils Absolute 0.47 Thousand/µL      Basophils Absolute 0.08 Thousands/µL             No orders to display       Procedures    ED Medication and Procedure Management   Prior to Admission Medications   Prescriptions Last Dose Informant Patient Reported? Taking?   Biotin 5000 MCG CAPS  Self Yes No   Sig: Take by mouth in the morning   Cequa 0.09 % SOLN  Self No No   Sig: Apply 1 drop to eye 2 (two) times a day   Collagen-Vitamin C-Biotin (COLLAGEN PO)  Self Yes No   Sig: Take by mouth daily   DULoxetine (CYMBALTA) 60 mg delayed release capsule  Self No No   Sig: Take 2 capsules (120 mg total) by mouth daily   Multiple Vitamin (MULTIVITAMIN) capsule  Self Yes No   Sig: Take 1 capsule by mouth daily   Nurtec 75 MG TBDP  Self Yes No   Sig: Take 75 mg by mouth every other day   Riboflavin 100 MG TABS   Yes No   Vibegron 75 MG TABS  Self No No   Sig: Take 75 mg by mouth in the morning   acetaZOLAMIDE (DIAMOX) 250 mg  tablet  Self No No   Sig: Take 2 tablets (500 mg total) by mouth 2 (two) times a day   cariprazine (VRAYLAR) 3 MG capsule  Self Yes No   Sig: Take 3 mg by mouth daily at bedtime   cariprazine (Vraylar) 6 MG capsule  Self No No   Sig: Take 1 capsule (6 mg total) by mouth daily   celecoxib (CeleBREX) 200 mg capsule  Self No No   Sig: Take 1 capsule (200 mg total) by mouth 2 (two) times a day   cetirizine (ZyrTEC) 10 MG chewable tablet  Self Yes No   Sig: Chew 10 mg daily   cyclobenzaprine (FLEXERIL) 10 mg tablet  Self No No   Sig: Take 1 tablet (10 mg total) by mouth 2 (two) times a day as needed for muscle spasms (Headaches)   Patient not taking: Reported on 4/21/2025   docusate sodium (COLACE) 100 mg capsule  Self Yes No   Sig: Take 100 mg by mouth 2 (two) times a day   fluvoxaMINE (LUVOX) 50 mg tablet  Self Yes No   Sig: Take 50 mg by mouth daily with breakfast   folic acid (FOLVITE) 800 MCG tablet  Self Yes No   Sig: Take 800 mcg by mouth daily   gabapentin (NEURONTIN) 300 mg capsule  Self No No   Sig: Take 1 capsule (300 mg total) by mouth daily after breakfast   gabapentin (NEURONTIN) 600 MG tablet  Self No No   Sig: Take 1.5 tablets (900 mg total) by mouth daily at bedtime   lactulose (CHRONULAC) 10 g/15 mL solution  Self No No   Sig: Take 30 mL (20 g total) by mouth 2 (two) times a day   levothyroxine 112 mcg tablet  Self No No   Sig: Take 1 tablet (112 mcg total) by mouth daily in the early morning   lubiprostone (AMITIZA) 24 mcg capsule  Self No No   Sig: Take 1 capsule (24 mcg total) by mouth 2 (two) times a day with meals   metFORMIN (GLUCOPHAGE) 500 mg tablet  Self No No   Sig: Take 1 tablet (500 mg total) by mouth daily with breakfast   naratriptan (AMERGE) 2.5 MG tablet  Self Yes No   Sig: take 1 tablet by mouth AT ONSET OF MIGRAINE may repeat in 2 hours...  (REFER TO PRESCRIPTION NOTES).   omeprazole (PriLOSEC) 20 mg delayed release capsule  Self No No   Sig: Take 1 capsule (20 mg total) by mouth 2  (two) times a day   tirzepatide (Zepbound) 15 mg/0.5 mL auto-injector  Self No No   Sig: Inject 0.5 mL (15 mg total) under the skin once a week   topiramate (TOPAMAX) 100 mg tablet  Self Yes No   Sig: Take 150 mg by mouth 2 (two) times a day      Facility-Administered Medications: None     Discharge Medication List as of 5/9/2025 10:24 PM        CONTINUE these medications which have NOT CHANGED    Details   acetaZOLAMIDE (DIAMOX) 250 mg tablet Take 2 tablets (500 mg total) by mouth 2 (two) times a day, Starting Mon 11/4/2024, No Print      Biotin 5000 MCG CAPS Take by mouth in the morning, Historical Med      cariprazine (VRAYLAR) 3 MG capsule Take 3 mg by mouth daily at bedtime, Historical Med      celecoxib (CeleBREX) 200 mg capsule Take 1 capsule (200 mg total) by mouth 2 (two) times a day, Starting Tue 4/16/2024, Normal      Cequa 0.09 % SOLN Apply 1 drop to eye 2 (two) times a day, Starting Sat 8/5/2023, Normal      cetirizine (ZyrTEC) 10 MG chewable tablet Chew 10 mg daily, Historical Med      Collagen-Vitamin C-Biotin (COLLAGEN PO) Take by mouth daily, Historical Med      cyclobenzaprine (FLEXERIL) 10 mg tablet Take 1 tablet (10 mg total) by mouth 2 (two) times a day as needed for muscle spasms (Headaches), Starting Sun 4/20/2025, Normal      docusate sodium (COLACE) 100 mg capsule Take 100 mg by mouth 2 (two) times a day, Historical Med      DULoxetine (CYMBALTA) 60 mg delayed release capsule Take 2 capsules (120 mg total) by mouth daily, Starting Tue 11/5/2024, Until Mon 4/21/2025, Normal      fluvoxaMINE (LUVOX) 50 mg tablet Take 50 mg by mouth daily with breakfast, Starting Fri 4/11/2025, Historical Med      folic acid (FOLVITE) 800 MCG tablet Take 800 mcg by mouth daily, Historical Med      gabapentin (NEURONTIN) 300 mg capsule Take 1 capsule (300 mg total) by mouth daily after breakfast, Starting Fri 7/12/2024, No Print      gabapentin (NEURONTIN) 600 MG tablet Take 1.5 tablets (900 mg total) by mouth  daily at bedtime, Starting Tue 4/16/2024, Normal      lactulose (CHRONULAC) 10 g/15 mL solution Take 30 mL (20 g total) by mouth 2 (two) times a day, Starting Fri 5/2/2025, Normal      levothyroxine 112 mcg tablet Take 1 tablet (112 mcg total) by mouth daily in the early morning, Starting Fri 2/28/2025, Normal      lubiprostone (AMITIZA) 24 mcg capsule Take 1 capsule (24 mcg total) by mouth 2 (two) times a day with meals, Starting Thu 4/17/2025, Normal      metFORMIN (GLUCOPHAGE) 500 mg tablet Take 1 tablet (500 mg total) by mouth daily with breakfast, Starting Mon 1/27/2025, Normal      Multiple Vitamin (MULTIVITAMIN) capsule Take 1 capsule by mouth daily, Historical Med      naratriptan (AMERGE) 2.5 MG tablet take 1 tablet by mouth AT ONSET OF MIGRAINE may repeat in 2 hours...  (REFER TO PRESCRIPTION NOTES)., Historical Med      Nurtec 75 MG TBDP Take 75 mg by mouth every other day, Starting Tue 9/3/2024, Historical Med      omeprazole (PriLOSEC) 20 mg delayed release capsule Take 1 capsule (20 mg total) by mouth 2 (two) times a day, Starting Fri 4/11/2025, Normal      Riboflavin 100 MG TABS Historical Med      tirzepatide (Zepbound) 15 mg/0.5 mL auto-injector Inject 0.5 mL (15 mg total) under the skin once a week, Starting Tue 4/29/2025, Normal      topiramate (TOPAMAX) 100 mg tablet Take 150 mg by mouth 2 (two) times a day, Starting Sat 6/1/2024, Historical Med      Vibegron 75 MG TABS Take 75 mg by mouth in the morning, Starting Thu 2/13/2025, Normal           No discharge procedures on file.  ED SEPSIS DOCUMENTATION   Time reflects when diagnosis was documented in both MDM as applicable and the Disposition within this note       Time User Action Codes Description Comment    5/9/2025 10:23 PM Lee Ann Batista [R51.9] Headache     5/9/2025 10:23 PM Lee Ann Batista [E16.2] Hypoglycemia                  Lee Ann Batista MD  05/10/25 0012

## 2025-05-10 NOTE — DISCHARGE INSTRUCTIONS
Hold metformin until recheck with weight mangement  Plenty of fliuds  Return with fever worsening or change in headache or any new or worsening symptoms

## 2025-05-10 NOTE — ED NOTES
Patient given fluids, taking fluids well. Pt. Also provided w/ snack, tolerating PO well.  Pt. Ambulated to bathroom independently.      Akiko Simpson RN  05/09/25 [id number]

## 2025-05-13 DIAGNOSIS — K59.09 CHRONIC CONSTIPATION: ICD-10-CM

## 2025-05-13 RX ORDER — LACTULOSE 10 G/15ML
30 SOLUTION ORAL 2 TIMES DAILY
Qty: 5400 ML | Refills: 1 | Status: SHIPPED | OUTPATIENT
Start: 2025-05-13

## 2025-05-14 ENCOUNTER — TELEPHONE (OUTPATIENT)
Age: 40
End: 2025-05-14

## 2025-05-14 RX ORDER — LUBIPROSTONE 24 UG/1
CAPSULE ORAL
Qty: 180 CAPSULE | Refills: 5 | Status: SHIPPED | OUTPATIENT
Start: 2025-05-14

## 2025-05-14 NOTE — TELEPHONE ENCOUNTER
FOLLOW UP: [phone]    REASON FOR CONVERSATION: Medication Problem    SYMPTOMS:   Name of Medication: “What is the medication you are calling about?” Zepbound    Dosage: “What dose are you currently taking” 12.5mg    Question: “What is your question?” (Medication refill, side effect, reaction) side effect    Prescribing HCP: “Who prescribed it?” TRAV Lozano PA-C    Symptoms: “Do you have any symptoms?” hypoglycemia     Severity: If the symptoms are present are the mild, moderate, severe? 50s-60s    Last Dose: last Wednesday    OTHER: PT currently at [facility] in [place] for planned admission for migraines. PT noted to be hypoglycemic. Providers at hospital advised PT to follow-up with provider to ask for recommendations.      DISPOSITION: Discuss with Provider and Call Back Patient

## 2025-05-16 ENCOUNTER — DOCUMENTATION (OUTPATIENT)
Dept: BARIATRICS | Facility: CLINIC | Age: 40
End: 2025-05-16

## 2025-05-16 NOTE — TELEPHONE ENCOUNTER
"Spoke with Marla Tijerina and she stated \"She should discuss any hypoglycemic issues with PCP. We don't anticipate severe hypoglycemic events with zepbound. In the meantime she can eat smaller more frequent meals with protein, complex carbs, and fats but ultimately should be monitored by PCP if keeps happening.\"    Placed call to patient and left a message to return our call.        "

## 2025-05-16 NOTE — TELEPHONE ENCOUNTER
PT called in following-up on prior call because she did not hear back yet.     PT strongly requesting response before end of day.

## 2025-05-18 ENCOUNTER — NURSE TRIAGE (OUTPATIENT)
Dept: OTHER | Facility: OTHER | Age: 40
End: 2025-05-18

## 2025-05-19 ENCOUNTER — PATIENT MESSAGE (OUTPATIENT)
Age: 40
End: 2025-05-19

## 2025-05-19 ENCOUNTER — TRANSITIONAL CARE MANAGEMENT (OUTPATIENT)
Dept: FAMILY MEDICINE CLINIC | Facility: CLINIC | Age: 40
End: 2025-05-19

## 2025-05-19 DIAGNOSIS — R60.1 GENERALIZED EDEMA: Primary | ICD-10-CM

## 2025-05-19 RX ORDER — POTASSIUM CHLORIDE 750 MG/1
10 CAPSULE, EXTENDED RELEASE ORAL AS NEEDED
Qty: 10 CAPSULE | Refills: 0 | Status: SHIPPED | OUTPATIENT
Start: 2025-05-19

## 2025-05-19 RX ORDER — FUROSEMIDE 40 MG/1
40 TABLET ORAL AS NEEDED
Qty: 10 TABLET | Refills: 0 | Status: SHIPPED | OUTPATIENT
Start: 2025-05-19

## 2025-05-19 RX ORDER — MEXILETINE HYDROCHLORIDE 150 MG/1
150 CAPSULE ORAL
COMMUNITY
Start: 2025-05-16 | End: 2025-08-14

## 2025-05-19 NOTE — TELEPHONE ENCOUNTER
"FOLLOW UP: How long should patient continue daily Lasix? Please call patient to follow up.     REASON FOR CONVERSATION: Leg Swelling    SYMPTOMS: ESC sent to on call provider-  recently hospitalized at [facility] 5/12-5/16 for migraine. On 5/12 she was 341lbs, today she is 348. She notes her upper arms feel \"puffy\". also notes puffiness in her legs. Denies any chest pain or shortness of breath. Notes she feels like she is retaining water. Reports she was on Lasix 40mg as needed for fluid retention. Has not taken it in a while, but still has some at home.     Per on call-  would recommend she take one tonight. Also to take one tomorrow morning. She may need to take daily until edema improves. Also restrict daily sodium to 2 grams. She should also be taking her diamox.     OTHER: None    DISPOSITION:  Now (overriding See PCP Within 3 Days)    Reason for Disposition   [1] MILD swelling of both ankles (i.e., pedal edema) AND [2] new-onset or getting worse    Answer Assessment - Initial Assessment Questions  1. ONSET: \"When did the swelling start?\" (e.g., minutes, hours, days)      Ongoing but worse today    2. LOCATION: \"What part of the leg is swollen?\"  \"Are both legs swollen or just one leg?\"      B/l lower legs feel \"puffy\"    4. REDNESS: \"Does the swelling look red or infected?\"      None noted    5. PAIN: \"Is the swelling painful to touch?\" If Yes, ask: \"How painful is it?\"   (Scale 1-10; mild, moderate or severe)      None noted    6. FEVER: \"Do you have a fever?\" If Yes, ask: \"What is it, how was it measured, and when did it start?\"       Denies    7. CAUSE: \"What do you think is causing the leg swelling?\"      Hospitalized at [facility] from 5/12-5/16    9. RECURRENT SYMPTOM: \"Have you had leg swelling before?\" If Yes, ask: \"When was the last time?\" \"What happened that time?\"      Has taken lasix in the past for fluid retention    10. OTHER SYMPTOMS: \"Do you have any other symptoms?\" (e.g., chest " pain, difficulty breathing)        Increased weight. Weight on 5/12 was 341, weight today 348    Protocols used: Leg Swelling and Edema-Adult-

## 2025-05-19 NOTE — PATIENT COMMUNICATION
Patient calling to ask if Potassium was sent to the pharmacy of [facility]. Confirmed the potassium and lasix were both sent to the [facility] on file.  She verbalized understanding  No further action needed

## 2025-05-20 ENCOUNTER — HOSPITAL ENCOUNTER (EMERGENCY)
Facility: HOSPITAL | Age: 40
Discharge: HOME | End: 2025-05-20
Attending: EMERGENCY MEDICINE
Payer: COMMERCIAL

## 2025-05-20 ENCOUNTER — APPOINTMENT (EMERGENCY)
Dept: NON INVASIVE DIAGNOSTICS | Facility: HOSPITAL | Age: 40
End: 2025-05-20
Payer: COMMERCIAL

## 2025-05-20 ENCOUNTER — APPOINTMENT (EMERGENCY)
Dept: RADIOLOGY | Facility: HOSPITAL | Age: 40
End: 2025-05-20
Payer: COMMERCIAL

## 2025-05-20 ENCOUNTER — OFFICE VISIT (OUTPATIENT)
Dept: URGENT CARE | Facility: CLINIC | Age: 40
End: 2025-05-20
Payer: COMMERCIAL

## 2025-05-20 ENCOUNTER — TELEPHONE (OUTPATIENT)
Dept: FAMILY MEDICINE CLINIC | Facility: CLINIC | Age: 40
End: 2025-05-20

## 2025-05-20 VITALS
TEMPERATURE: 96.8 F | SYSTOLIC BLOOD PRESSURE: 140 MMHG | WEIGHT: 293 LBS | BODY MASS INDEX: 45.99 KG/M2 | HEIGHT: 67 IN | RESPIRATION RATE: 18 BRPM | OXYGEN SATURATION: 99 % | DIASTOLIC BLOOD PRESSURE: 98 MMHG

## 2025-05-20 VITALS
HEART RATE: 76 BPM | OXYGEN SATURATION: 100 % | SYSTOLIC BLOOD PRESSURE: 124 MMHG | DIASTOLIC BLOOD PRESSURE: 56 MMHG | RESPIRATION RATE: 20 BRPM | TEMPERATURE: 97.3 F

## 2025-05-20 DIAGNOSIS — R60.0 LOWER EXTREMITY EDEMA: Primary | ICD-10-CM

## 2025-05-20 DIAGNOSIS — M79.89 LEG SWELLING: Primary | ICD-10-CM

## 2025-05-20 DIAGNOSIS — E66.813 CLASS 3 OBESITY: Primary | ICD-10-CM

## 2025-05-20 LAB
ALBUMIN SERPL BCG-MCNC: 3.7 G/DL (ref 3.5–5)
ALP SERPL-CCNC: 62 U/L (ref 34–104)
ALT SERPL W P-5'-P-CCNC: 15 U/L (ref 7–52)
ANION GAP SERPL CALCULATED.3IONS-SCNC: 8 MMOL/L (ref 4–13)
AST SERPL W P-5'-P-CCNC: 13 U/L (ref 13–39)
BASOPHILS # BLD AUTO: 0.11 THOUSANDS/ÂΜL (ref 0–0.1)
BASOPHILS NFR BLD AUTO: 1 % (ref 0–1)
BILIRUB SERPL-MCNC: 0.23 MG/DL (ref 0.2–1)
BNP SERPL-MCNC: 45 PG/ML (ref 0–100)
BUN SERPL-MCNC: 19 MG/DL (ref 5–25)
CALCIUM SERPL-MCNC: 8.8 MG/DL (ref 8.4–10.2)
CHLORIDE SERPL-SCNC: 111 MMOL/L (ref 96–108)
CO2 SERPL-SCNC: 22 MMOL/L (ref 21–32)
CREAT SERPL-MCNC: 0.85 MG/DL (ref 0.6–1.3)
EOSINOPHIL # BLD AUTO: 0.49 THOUSAND/ÂΜL (ref 0–0.61)
EOSINOPHIL NFR BLD AUTO: 4 % (ref 0–6)
ERYTHROCYTE [DISTWIDTH] IN BLOOD BY AUTOMATED COUNT: 13.4 % (ref 11.6–15.1)
GFR SERPL CREATININE-BSD FRML MDRD: 85 ML/MIN/1.73SQ M
GLUCOSE SERPL-MCNC: 95 MG/DL (ref 65–140)
HCT VFR BLD AUTO: 37.5 % (ref 34.8–46.1)
HGB BLD-MCNC: 12 G/DL (ref 11.5–15.4)
IMM GRANULOCYTES # BLD AUTO: 0.09 THOUSAND/UL (ref 0–0.2)
IMM GRANULOCYTES NFR BLD AUTO: 1 % (ref 0–2)
LYMPHOCYTES # BLD AUTO: 3.35 THOUSANDS/ÂΜL (ref 0.6–4.47)
LYMPHOCYTES NFR BLD AUTO: 28 % (ref 14–44)
MCH RBC QN AUTO: 28.4 PG (ref 26.8–34.3)
MCHC RBC AUTO-ENTMCNC: 32 G/DL (ref 31.4–37.4)
MCV RBC AUTO: 89 FL (ref 82–98)
MONOCYTES # BLD AUTO: 0.93 THOUSAND/ÂΜL (ref 0.17–1.22)
MONOCYTES NFR BLD AUTO: 8 % (ref 4–12)
NEUTROPHILS # BLD AUTO: 6.92 THOUSANDS/ÂΜL (ref 1.85–7.62)
NEUTS SEG NFR BLD AUTO: 58 % (ref 43–75)
NRBC BLD AUTO-RTO: 0 /100 WBCS
PLATELET # BLD AUTO: 271 THOUSANDS/UL (ref 149–390)
PMV BLD AUTO: 10 FL (ref 8.9–12.7)
POTASSIUM SERPL-SCNC: 3.5 MMOL/L (ref 3.5–5.3)
PROT SERPL-MCNC: 6 G/DL (ref 6.4–8.4)
RBC # BLD AUTO: 4.23 MILLION/UL (ref 3.81–5.12)
SODIUM SERPL-SCNC: 141 MMOL/L (ref 135–147)
WBC # BLD AUTO: 11.89 THOUSAND/UL (ref 4.31–10.16)

## 2025-05-20 PROCEDURE — 93970 EXTREMITY STUDY: CPT

## 2025-05-20 PROCEDURE — 83880 ASSAY OF NATRIURETIC PEPTIDE: CPT | Performed by: EMERGENCY MEDICINE

## 2025-05-20 PROCEDURE — 93970 EXTREMITY STUDY: CPT | Performed by: SURGERY

## 2025-05-20 PROCEDURE — 99284 EMERGENCY DEPT VISIT MOD MDM: CPT | Performed by: EMERGENCY MEDICINE

## 2025-05-20 PROCEDURE — 93005 ELECTROCARDIOGRAM TRACING: CPT

## 2025-05-20 PROCEDURE — S9088 SERVICES PROVIDED IN URGENT: HCPCS

## 2025-05-20 PROCEDURE — 71046 X-RAY EXAM CHEST 2 VIEWS: CPT

## 2025-05-20 PROCEDURE — 99284 EMERGENCY DEPT VISIT MOD MDM: CPT

## 2025-05-20 PROCEDURE — 36415 COLL VENOUS BLD VENIPUNCTURE: CPT | Performed by: EMERGENCY MEDICINE

## 2025-05-20 PROCEDURE — 80053 COMPREHEN METABOLIC PANEL: CPT | Performed by: EMERGENCY MEDICINE

## 2025-05-20 PROCEDURE — 99213 OFFICE O/P EST LOW 20 MIN: CPT

## 2025-05-20 PROCEDURE — 85025 COMPLETE CBC W/AUTO DIFF WBC: CPT | Performed by: EMERGENCY MEDICINE

## 2025-05-20 RX ORDER — BENZTROPINE MESYLATE 1 MG/1
TABLET ORAL
COMMUNITY
Start: 2025-05-16

## 2025-05-20 RX ORDER — TIRZEPATIDE 10 MG/.5ML
10 INJECTION, SOLUTION SUBCUTANEOUS WEEKLY
Qty: 2 ML | Refills: 0 | Status: SHIPPED | OUTPATIENT
Start: 2025-05-20 | End: 2025-06-17

## 2025-05-20 RX ORDER — FLUVOXAMINE MALEATE 25 MG
1 TABLET ORAL DAILY
COMMUNITY
Start: 2025-05-17

## 2025-05-20 RX ORDER — DIHYDROERGOTAMINE MESYLATE 4 MG/ML
SPRAY NASAL
COMMUNITY
Start: 2025-05-16

## 2025-05-20 RX ORDER — KETOROLAC TROMETHAMINE 30 MG/ML
INJECTION, SOLUTION INTRAMUSCULAR; INTRAVENOUS
COMMUNITY
Start: 2025-05-16

## 2025-05-20 NOTE — DISCHARGE INSTRUCTIONS
Please take lasix daily for the next week, you can stop taking lasix if the swelling is improved. Please also keep track of daily weights.

## 2025-05-20 NOTE — TELEPHONE ENCOUNTER
Patient stated she was concerned about a bruise behind left knee. Patient did go to ED per Urgent Care. She will continue taking lasix and update nephro.     ----- Message from Norma Traore MD sent at 5/19/2025 10:11 AM EDT -----  Regarding: Patient concern  Hi Ladies,I got a message from Nephrology wanting us to follow up on patient leg swelling. Can you call and see how she made out taking lasix.Thanks,

## 2025-05-20 NOTE — ED PROVIDER NOTES
Time reflects when diagnosis was documented in both MDM as applicable and the Disposition within this note       Time User Action Codes Description Comment    5/20/2025  2:10 PM MillsDalia Eugene Add [R60.0] Lower extremity edema           ED Disposition       ED Disposition   Discharge    Condition   Stable    Date/Time   Tue May 20, 2025  2:10 PM    Comment   Gita Rendon discharge to home/self care.                   Assessment & Plan       Medical Decision Making  Amount and/or Complexity of Data Reviewed  Labs: ordered.  Radiology: ordered.      39-year-old female with history of headaches, IIH, and hypertension presenting for evaluation of leg swelling.  Patient does have some swelling in her legs, she has multiple things that may have been causing this including she was recent in the hospital had IV medications running which could contribute to some mild fluid overload, she also received steroids which can cause swelling.  Will obtain duplex to evaluate for DVT.  Will obtain chest x-ray, EKG and BNP to evaluate for cardiac etiology or fluid overload.  Will obtain CBC to evaluate for anemia or leukocytosis, CMP to evaluate for metabolic abnormality or electrolyte abnormality.  Reviewed labs, no marked abnormalities.  Reviewed interpreted EKG, shows normal sinus rhythm without acute ischemic changes, reviewed interpreted chest x-ray, no evidence of fluid overload or cardiomegaly.  Discussed results with patient, DVT study also negative for DVT.  Will have patient continue Lasix daily for the next week.  Will have patient follow-up with nephrology as well as her primary care doctor.  Discussed that symptoms may be related to fluid overload secondary to iatrogenic from being in the hospital versus possibly from steroids.  Recommend continue to keep track of daily weights.  If swelling feels like it is improved, she may stop taking the Lasix.  Discussed with patient strict return precautions.  Patient expressed  "understanding and was agreeable for discharge.        Medications - No data to display    ED Risk Strat Scores                    No data recorded        SBIRT 22yo+      Flowsheet Row Most Recent Value   Initial Alcohol Screen: US AUDIT-C     1. How often do you have a drink containing alcohol? 0 Filed at: 05/20/2025 1126   2. How many drinks containing alcohol do you have on a typical day you are drinking?  0 Filed at: 05/20/2025 1126   3a. Male UNDER 65: How often do you have five or more drinks on one occasion? 0 Filed at: 05/20/2025 1126   3b. FEMALE Any Age, or MALE 65+: How often do you have 4 or more drinks on one occassion? 0 Filed at: 05/20/2025 1126   Audit-C Score 0 Filed at: 05/20/2025 1126   JENNIFER: How many times in the past year have you...    Used an illegal drug or used a prescription medication for non-medical reasons? Never Filed at: 05/20/2025 1126                            History of Present Illness       Chief Complaint   Patient presents with    Leg Swelling     Bilateral leg swelling since Sunday night. States nephrology \"just switched her back to lasix from diamox\"       Past Medical History:   Diagnosis Date    ADHD (attention deficit hyperactivity disorder)     Allergic     Allergic rhinitis     Anorexia nervosa in remission     Anxiety     Arthritis 5/8/2014    Back pain     Bipolar disorder (HCC)     Chronic diarrhea     Chronic pain disorder     Depression     Dermatitis 10/16/2021    Disease of thyroid gland     Diverticulitis of colon 9/20/2015    Dizziness     Ear problems     Eating disorder     Environmental and seasonal allergies     Esophageal reflux 08/15/2013    GERD (gastroesophageal reflux disease) 8/15/2013    Headache(784.0)     Headache, tension-type     Hypertension     Hypothyroid     Idiopathic intracranial hypertension     Impulse control disorder     Lumbar degenerative disc disease 05/08/2014    Memory loss     Migraine     Nasal congestion     Nosebleed     Obesity  "    Obsessive-compulsive disorder     Osteoarthritis 5/8/2014    Otitis media     Overactive bladder     Panic attack     Psychiatric disorder     Psychiatric illness     PTSD (post-traumatic stress disorder)     Restless leg syndrome, controlled     Seasonal allergies     Self-injurious behavior     Sinus problem     Sinusitis     Sleep apnea     Sleep difficulties     Suicide and self-inflicted injury (HCC)     Suicide attempt (HCC)     Tinnitus     TMJ dysfunction     Tonsillitis     Transcranial Magnetic Stimulation 09/06/2021    Urinary incontinence     Urinary tract infection     Vision loss       Past Surgical History:   Procedure Laterality Date    COLONOSCOPY  10/5/2015    DENTAL SURGERY      wisdom teeth-at [age] years. Other surgery dental extraxtion of bone spur (10 years ago)    IR LUMBAR PUNCTURE  02/26/2019    SINUS ENDOSCOPY      SINUS SURGERY      TONSILLECTOMY        Family History   Problem Relation Age of Onset    Mental illness Mother     Depression Mother     Suicide Attempts Mother     Hypertension Mother     Diabetes Mother     Anxiety disorder Mother     Psychiatric Illness Mother     Schizoaffective Disorder  Mother     Anemia Mother     Colon polyps Mother     Transient ischemic attack Mother     Snoring Mother     Hypertension Father     Hypothyroidism Father     Thyroid disease Father     Sudden death Father     Psoriasis Father     Heart disease Father     Hypertension Brother     Cancer Maternal Grandmother     Heart disease Maternal Grandmother     Stroke Maternal Grandmother     Breast cancer Maternal Grandmother     Skin cancer Maternal Grandmother     Arthritis Maternal Grandmother     Pneumonia Maternal Grandfather     Cancer Maternal Grandfather     Dementia Maternal Grandfather     COPD Maternal Grandfather     Hearing loss Maternal Grandfather     Cancer Paternal Grandmother     Pneumonia Paternal Grandfather     Arthritis Family     Breast cancer Family     Osteopenia Family      Osteoporosis Family     Hypertension Family     Transient ischemic attack Family       Social History[1]   E-Cigarette/Vaping    E-Cigarette Use Never User       E-Cigarette/Vaping Substances    Nicotine No     THC No     CBD No     Flavoring No     Other No     Unknown No       I have reviewed and agree with the history as documented.     HPI    39-year-old female with history of headaches, IIH, and hypertension presenting for evaluation of leg swelling.  Patient was recently admitted to the headache unit at [facility] last week.  She was having intractable headaches and was treated with multiple medications including lidocaine, steroids.  She states her headaches are significantly improved but has now noticed some swelling and discoloration in her legs.  She has also noticed weight gain.  She states she started taking Lasix over the past 3 days with some improvement in her weight gain but still having swelling in the legs.  She states she has some mild shortness of breath and cough which is worse with lying flat.  She otherwise denies chest pain.  Denies fevers.  She states she has been feeling hot and flushed occasionally since being in the hospital.  Denies abdominal pain, nausea, vomiting, or diarrhea.    Review of Systems   Constitutional:  Negative for appetite change, chills and fever.   HENT:  Negative for congestion, rhinorrhea and sore throat.    Respiratory:  Positive for cough and shortness of breath.    Cardiovascular:  Positive for leg swelling. Negative for chest pain.   Gastrointestinal:  Negative for abdominal pain, diarrhea, nausea and vomiting.   Genitourinary:  Negative for dysuria, frequency, hematuria and urgency.   Musculoskeletal:  Negative for arthralgias and myalgias.   Skin:  Negative for rash.   Neurological:  Negative for dizziness, weakness, light-headedness, numbness and headaches.   All other systems reviewed and are negative.          Objective       ED Triage Vitals [05/20/25  1125]   Temperature Pulse Blood Pressure Respirations SpO2 Patient Position - Orthostatic VS   (!) 97.3 °F (36.3 °C) 98 139/77 18 99 % Sitting      Temp Source Heart Rate Source BP Location FiO2 (%) Pain Score    Temporal Monitor Left arm -- No Pain      Vitals      Date and Time Temp Pulse SpO2 Resp BP Pain Score FACES Pain Rating User   05/20/25 1400 -- 76 100 % 20 124/56 No Pain -- CLS   05/20/25 1330 -- 74 99 % 21 122/60 No Pain -- CLS   05/20/25 1125 97.3 °F (36.3 °C) 98 99 % 18 139/77 No Pain -- CLS            Physical Exam  Vitals and nursing note reviewed.   Constitutional:       General: She is not in acute distress.     Appearance: Normal appearance. She is well-developed. She is obese. She is not ill-appearing, toxic-appearing or diaphoretic.   HENT:      Head: Normocephalic and atraumatic.      Right Ear: External ear normal.      Left Ear: External ear normal.      Nose: Nose normal.      Mouth/Throat:      Mouth: Mucous membranes are moist.      Pharynx: Oropharynx is clear.     Eyes:      Extraocular Movements: Extraocular movements intact.      Conjunctiva/sclera: Conjunctivae normal.       Cardiovascular:      Rate and Rhythm: Normal rate and regular rhythm.      Pulses: Normal pulses.      Heart sounds: Normal heart sounds. No murmur heard.     No friction rub. No gallop.   Pulmonary:      Effort: Pulmonary effort is normal. No respiratory distress.      Breath sounds: Normal breath sounds. No wheezing or rales.   Abdominal:      General: There is no distension.      Palpations: Abdomen is soft.      Tenderness: There is no abdominal tenderness. There is no guarding or rebound.     Musculoskeletal:         General: No tenderness.      Cervical back: Neck supple.      Right lower leg: Edema present.      Left lower leg: Edema present.     Skin:     General: Skin is warm and dry.      Coloration: Skin is not pale.      Findings: No erythema or rash.      Comments: Varicose veins in bilateral lower  legs.      Neurological:      General: No focal deficit present.      Mental Status: She is alert and oriented to person, place, and time.      Cranial Nerves: No cranial nerve deficit.      Sensory: No sensory deficit.      Motor: No weakness.     Psychiatric:         Mood and Affect: Mood normal.         Behavior: Behavior normal.         Results Reviewed       Procedure Component Value Units Date/Time    Comprehensive metabolic panel [[id number]]  (Abnormal) Collected: 05/20/25 1258    Lab Status: Final result Specimen: Blood from Arm, Left Updated: 05/20/25 1328     Sodium 141 mmol/L      Potassium 3.5 mmol/L      Chloride 111 mmol/L      CO2 22 mmol/L      ANION GAP 8 mmol/L      BUN 19 mg/dL      Creatinine 0.85 mg/dL      Glucose 95 mg/dL      Calcium 8.8 mg/dL      AST 13 U/L      ALT 15 U/L      Alkaline Phosphatase 62 U/L      Total Protein 6.0 g/dL      Albumin 3.7 g/dL      Total Bilirubin 0.23 mg/dL      eGFR 85 ml/min/1.73sq m     Narrative:      National Kid[employer] Foundation guidelines for Chronic Kidney Disease (CKD):     Stage 1 with normal or high GFR (GFR > 90 mL/min/1.73 square meters)    Stage 2 Mild CKD (GFR = 60-89 mL/min/1.73 square meters)    Stage 3A Moderate CKD (GFR = 45-59 mL/min/1.73 square meters)    Stage 3B Moderate CKD (GFR = 30-44 mL/min/1.73 square meters)    Stage 4 Severe CKD (GFR = 15-29 mL/min/1.73 square meters)    Stage 5 End Stage CKD (GFR <15 mL/min/1.73 square meters)  Note: GFR calculation is accurate only with a steady state creatinine    B-Type Natriuretic Peptide(BNP) [[id number]]  (Normal) Collected: 05/20/25 1258    Lab Status: Final result Specimen: Blood from Arm, Left Updated: 05/20/25 1327     BNP 45 pg/mL     CBC and differential [[id number]]  (Abnormal) Collected: 05/20/25 1258    Lab Status: Final result Specimen: Blood from Arm, Left Updated: 05/20/25 1305     WBC 11.89 Thousand/uL      RBC 4.23 Million/uL      Hemoglobin 12.0 g/dL      Hematocrit 37.5 %       MCV 89 fL      MCH 28.4 pg      MCHC 32.0 g/dL      RDW 13.4 %      MPV 10.0 fL      Platelets 271 Thousands/uL      nRBC 0 /100 WBCs      Segmented % 58 %      Immature Grans % 1 %      Lymphocytes % 28 %      Monocytes % 8 %      Eosinophils Relative 4 %      Basophils Relative 1 %      Absolute Neutrophils 6.92 Thousands/µL      Absolute Immature Grans 0.09 Thousand/uL      Absolute Lymphocytes 3.35 Thousands/µL      Absolute Monocytes 0.93 Thousand/µL      Eosinophils Absolute 0.49 Thousand/µL      Basophils Absolute 0.11 Thousands/µL             XR chest 2 views   Final Interpretation by Tyesha Simpson MD (05/20 1327)      No acute cardiopulmonary disease.            Workstation performed: [id number]          VAS VENOUS DUPLEX - LOWER LIMB BILATERAL    (Results Pending)       ECG 12 Lead Documentation Only    Date/Time: 5/20/2025 1:38 PM    Performed by: Dalia Singh MD  Authorized by: Dalia Singh MD    Indications / Diagnosis:  Leg swelling  ECG reviewed by me, the ED Provider: yes    Patient location:  ED  Previous ECG:     Previous ECG:  Compared to current    Similarity:  No change    Comparison to cardiac monitor: Yes    Interpretation:     Interpretation: normal    Rate:     ECG rate:  61    ECG rate assessment: normal    Rhythm:     Rhythm: sinus rhythm    Ectopy:     Ectopy: none    QRS:     QRS axis:  Normal    QRS intervals:  Normal  Conduction:     Conduction: normal    ST segments:     ST segments:  Normal  T waves:     T waves: normal        ED Medication and Procedure Management   Prior to Admission Medications   Prescriptions Last Dose Informant Patient Reported? Taking?   Biotin 5000 MCG CAPS 5/20/2025 Self Yes Yes   Sig: Take by mouth in the morning   Cequa 0.09 % SOLN  Self No No   Sig: Apply 1 drop to eye 2 (two) times a day   Collagen-Vitamin C-Biotin (COLLAGEN PO)  Self Yes No   Sig: Take by mouth in the morning.   DULoxetine (CYMBALTA) 60 mg delayed release capsule  2025 Self No Yes   Sig: Take 2 capsules (120 mg total) by mouth daily   Multiple Vitamin (MULTIVITAMIN) capsule 2025 Self Yes Yes   Sig: Take 1 capsule by mouth in the morning.   Nurtec 75 MG TBDP 2025 Self Yes Yes   Sig: Take 75 mg by mouth every other day   Riboflavin 100 MG TABS 2025  Yes Yes   Vibegron 75 MG TABS 2025 Self No Yes   Sig: Take 75 mg by mouth in the morning   acetaZOLAMIDE (DIAMOX) 250 mg tablet 2025 Self No Yes   Sig: Take 2 tablets (500 mg total) by mouth 2 (two) times a day   benztropine (COGENTIN) 1 mg tablet 2025  Yes Yes   Sig: Take 1 tab up to 3 times a day as needed for tremors, restlessness, muscle stiffness or anxiety   cariprazine (VRAYLAR) 3 MG capsule 2025 Self Yes Yes   Sig: Take 3 mg by mouth daily at bedtime   cariprazine (Vraylar) 6 MG capsule  Self No No   Sig: Take 1 capsule (6 mg total) by mouth daily   celecoxib (CeleBREX) 200 mg capsule 2025 Self No Yes   Sig: Take 1 capsule (200 mg total) by mouth 2 (two) times a day   cetirizine (ZyrTEC) 10 MG chewable tablet 2025 Self Yes Yes   Sig: Chew 10 mg in the morning.   cyclobenzaprine (FLEXERIL) 10 mg tablet  Self No No   Sig: Take 1 tablet (10 mg total) by mouth 2 (two) times a day as needed for muscle spasms (Headaches)   Patient not taking: Reported on 2025   dihydroergotamine (MIGRANAL) 4 MG/ML nasal spray   Yes No   Si spray each nostril. Repeat in 15 minutes. May repeat entire process in 2 hours if no improvement. Max 8 sprays per day.  Max 2 days per week.   docusate sodium (COLACE) 100 mg capsule 2025 Self Yes Yes   Sig: Take 100 mg by mouth in the morning and 100 mg in the evening.   fluvoxaMINE (LUVOX) 25 MG tablet 2025  Yes Yes   Sig: Take 1 tablet by mouth in the morning   fluvoxaMINE (LUVOX) 50 mg tablet Not Taking Self Yes No   Sig: Take 50 mg by mouth daily with breakfast   Patient not taking: Reported on 2025   folic acid (FOLVITE) 800 MCG  tablet 5/20/2025 Self Yes Yes   Sig: Take 800 mcg by mouth in the morning.   furosemide (LASIX) 40 mg tablet 5/20/2025  No Yes   Sig: Take 1 tablet (40 mg total) by mouth if needed (take 1 tablet daily as needed for swelling)   gabapentin (NEURONTIN) 300 mg capsule 5/20/2025 Self No Yes   Sig: Take 1 capsule (300 mg total) by mouth daily after breakfast   gabapentin (NEURONTIN) 600 MG tablet 5/19/2025 Self No Yes   Sig: Take 1.5 tablets (900 mg total) by mouth daily at bedtime   ketorolac (TORADOL) 30 mg/mL injection   Yes No   Sig: inject 30 milligrams (1 MILLILITER) intramuscularly if needed for...  (REFER TO PRESCRIPTION NOTES).   lactulose (Constulose) 10 g/15 mL solution 5/19/2025  No Yes   Sig: take 30 MILLILITERS by mouth twice a day   levothyroxine 112 mcg tablet 5/20/2025 Self No Yes   Sig: Take 1 tablet (112 mcg total) by mouth daily in the early morning   lubiprostone (AMITIZA) 24 mcg capsule 5/20/2025  No Yes   Sig: take 1 capsule by mouth twice a day with meals   metFORMIN (GLUCOPHAGE) 500 mg tablet Not Taking Self No No   Sig: Take 1 tablet (500 mg total) by mouth daily with breakfast   Patient not taking: Reported on 5/20/2025   mexiletine (MEXITIL) 150 mg capsule 5/20/2025  Yes Yes   Sig: Take 150 mg by mouth   naratriptan (AMERGE) 2.5 MG tablet  Self Yes No   Sig: take 1 tablet by mouth AT ONSET OF MIGRAINE may repeat in 2 hours...  (REFER TO PRESCRIPTION NOTES).   omeprazole (PriLOSEC) 20 mg delayed release capsule 5/20/2025 Self No Yes   Sig: Take 1 capsule (20 mg total) by mouth 2 (two) times a day   potassium chloride (MICRO-K) 10 MEQ CR capsule 5/20/2025  No Yes   Sig: Take 1 capsule (10 mEq total) by mouth if needed (take 1 daily on days you use lasix)   tirzepatide (Zepbound) 10 mg/0.5 mL auto-injector   No No   Sig: Inject 0.5 mL (10 mg total) under the skin once a week for 28 days   topiramate (TOPAMAX) 100 mg tablet 5/20/2025 Self Yes Yes   Sig: Take 150 mg by mouth in the morning and 150  mg in the evening.      Facility-Administered Medications: None     Discharge Medication List as of 5/20/2025  2:11 PM        START taking these medications    Details   tirzepatide (Zepbound) 10 mg/0.5 mL auto-injector Inject 0.5 mL (10 mg total) under the skin once a week for 28 days, Starting Tue 5/20/2025, Until Tue 6/17/2025, Normal           CONTINUE these medications which have NOT CHANGED    Details   acetaZOLAMIDE (DIAMOX) 250 mg tablet Take 2 tablets (500 mg total) by mouth 2 (two) times a day, Starting Mon 11/4/2024, No Print      benztropine (COGENTIN) 1 mg tablet Take 1 tab up to 3 times a day as needed for tremors, restlessness, muscle stiffness or anxiety, Historical Med      Biotin 5000 MCG CAPS Take by mouth in the morning, Historical Med      cariprazine (VRAYLAR) 3 MG capsule Take 3 mg by mouth daily at bedtime, Historical Med      celecoxib (CeleBREX) 200 mg capsule Take 1 capsule (200 mg total) by mouth 2 (two) times a day, Starting Tue 4/16/2024, Normal      cetirizine (ZyrTEC) 10 MG chewable tablet Chew 10 mg in the morning., Historical Med      docusate sodium (COLACE) 100 mg capsule Take 100 mg by mouth in the morning and 100 mg in the evening., Historical Med      DULoxetine (CYMBALTA) 60 mg delayed release capsule Take 2 capsules (120 mg total) by mouth daily, Starting Tue 11/5/2024, Until Tue 5/20/2025, Normal      !! fluvoxaMINE (LUVOX) 25 MG tablet Take 1 tablet by mouth in the morning, Starting Sat 5/17/2025, Historical Med      folic acid (FOLVITE) 800 MCG tablet Take 800 mcg by mouth in the morning., Historical Med      furosemide (LASIX) 40 mg tablet Take 1 tablet (40 mg total) by mouth if needed (take 1 tablet daily as needed for swelling), Starting Mon 5/19/2025, Normal      gabapentin (NEURONTIN) 300 mg capsule Take 1 capsule (300 mg total) by mouth daily after breakfast, Starting Fri 7/12/2024, No Print      gabapentin (NEURONTIN) 600 MG tablet Take 1.5 tablets (900 mg total) by  mouth daily at bedtime, Starting Tue 4/16/2024, Normal      lactulose (Constulose) 10 g/15 mL solution take 30 MILLILITERS by mouth twice a day, Starting Tue 5/13/2025, Normal      levothyroxine 112 mcg tablet Take 1 tablet (112 mcg total) by mouth daily in the early morning, Starting Fri 2/28/2025, Normal      lubiprostone (AMITIZA) 24 mcg capsule take 1 capsule by mouth twice a day with meals, Normal      mexiletine (MEXITIL) 150 mg capsule Take 150 mg by mouth, Starting Fri 5/16/2025, Until Thu 8/14/2025 at 2359, Historical Med      Multiple Vitamin (MULTIVITAMIN) capsule Take 1 capsule by mouth in the morning., Historical Med      Nurtec 75 MG TBDP Take 75 mg by mouth every other day, Starting Tue 9/3/2024, Historical Med      omeprazole (PriLOSEC) 20 mg delayed release capsule Take 1 capsule (20 mg total) by mouth 2 (two) times a day, Starting Fri 4/11/2025, Normal      potassium chloride (MICRO-K) 10 MEQ CR capsule Take 1 capsule (10 mEq total) by mouth if needed (take 1 daily on days you use lasix), Starting Mon 5/19/2025, Normal      Riboflavin 100 MG TABS Historical Med      topiramate (TOPAMAX) 100 mg tablet Take 150 mg by mouth in the morning and 150 mg in the evening., Starting Sat 6/1/2024, Historical Med      Vibegron 75 MG TABS Take 75 mg by mouth in the morning, Starting Thu 2/13/2025, Normal      Cequa 0.09 % SOLN Apply 1 drop to eye 2 (two) times a day, Starting Sat 8/5/2023, Normal      Collagen-Vitamin C-Biotin (COLLAGEN PO) Take by mouth in the morning., Historical Med      cyclobenzaprine (FLEXERIL) 10 mg tablet Take 1 tablet (10 mg total) by mouth 2 (two) times a day as needed for muscle spasms (Headaches), Starting Sun 4/20/2025, Normal      dihydroergotamine (MIGRANAL) 4 MG/ML nasal spray 1 spray each nostril. Repeat in 15 minutes. May repeat entire process in 2 hours if no improvement. Max 8 sprays per day.  Max 2 days per week., Historical Med      !! fluvoxaMINE (LUVOX) 50 mg tablet Take  50 mg by mouth daily with breakfast, Starting Fri 4/11/2025, Historical Med      ketorolac (TORADOL) 30 mg/mL injection inject 30 milligrams (1 MILLILITER) intramuscularly if needed for...  (REFER TO PRESCRIPTION NOTES)., Historical Med      metFORMIN (GLUCOPHAGE) 500 mg tablet Take 1 tablet (500 mg total) by mouth daily with breakfast, Starting Mon 1/27/2025, Normal      naratriptan (AMERGE) 2.5 MG tablet take 1 tablet by mouth AT ONSET OF MIGRAINE may repeat in 2 hours...  (REFER TO PRESCRIPTION NOTES)., Historical Med       !! - Potential duplicate medications found. Please discuss with provider.        No discharge procedures on file.  ED SEPSIS DOCUMENTATION   Time reflects when diagnosis was documented in both MDM as applicable and the Disposition within this note       Time User Action Codes Description Comment    5/20/2025  2:10 PM Dalia Singh Add [R60.0] Lower extremity edema                      [1]   Social History  Tobacco Use    Smoking status: Never     Passive exposure: Never    Smokeless tobacco: Never   Vaping Use    Vaping status: Never Used   Substance Use Topics    Alcohol use: Never    Drug use: Never        Dalia Singh MD  05/20/25 1504

## 2025-05-20 NOTE — PROGRESS NOTES
[facility]        NAME: Gita Rendon is a 39 y.o. female  : 1985    MRN: [id number]  DATE: May 20, 2025  TIME: 10:21 AM    Assessment and Plan   Leg swelling [M79.89]  1. Leg swelling  Transfer to other facility        Bilateral leg swelling and 6lb weight gain 2 days after hospital discharge. Patient concerned for blood clot, advised that clot could not be ruled out in this clinic. Also advised that patient may be retaining fluid which requires additional workup as well. Recommended ER evaluation for further workup.    Patient Instructions       Follow up with PCP in 3-5 days.  Proceed to  ER if symptoms worsen.    If tests have been performed at [facility], our office will contact you with results if changes need to be made to the care plan discussed with you at the visit.  [facility]    Chief Complaint     Chief Complaint   Patient presents with    Leg Swelling     Patient is  here due to swelling of both of her legs, specially the right leg. Patient report a bruise in the back of her left leg and she is concerned for a blood cloth. Patient was in the hospital from 25 to 2025 in [place] and had a pick line.          History of Present Illness       39-year-old female presents with bilateral leg swelling for 3 days.  Patient reports that she was dialyzed at [place] from  to .  2 days after arriving home she noticed significant swelling to both legs, the right leg more than the left.  She also noted a 6 pound weight gain from when she left the hospital.  Patient reports she had Lasix at home which was previously trialed as a treatment for her IIH and she has been taking that since .  She reports her weight is returning to baseline but she still has swelling to her legs.  She also noted a bruise to the left popliteal fossa but denies any trauma to the area.  She is very nervous that she may have a blood clot.  She  reports that she had a PICC line while she was in the hospital and multiple IVs.  She was receiving heparin injections for DVT prophylaxis.  She reports she was not very mobile during her hospital stay and was not wearing compression boots.  She denies any pain in her legs.  Denies chest pain.  She reports mild shortness of breath at times but is unsure if this is due to to her cough.        Review of Systems   Review of Systems   Constitutional:  Negative for chills and fever.   Respiratory:  Positive for cough and shortness of breath. Negative for wheezing.    Cardiovascular:  Positive for leg swelling. Negative for chest pain and palpitations.         Current Medications     Current Medications[1]    Current Allergies     Allergies as of 05/20/2025 - Reviewed 05/20/2025   Allergen Reaction Noted    Doxycycline Other (See Comments) 03/28/2019    Other Other (See Comments) 02/26/2018            The following portions of the patient's history were reviewed and updated as appropriate: allergies, current medications, past family history, past medical history, past social history, past surgical history and problem list.     Past Medical History:   Diagnosis Date    ADHD (attention deficit hyperactivity disorder)     Allergic     Allergic rhinitis     Anorexia nervosa in remission     Anxiety     Arthritis 5/8/2014    Back pain     Bipolar disorder (HCC)     Chronic diarrhea     Chronic pain disorder     Depression     Dermatitis 10/16/2021    Disease of thyroid gland     Diverticulitis of colon 9/20/2015    Dizziness     Ear problems     Eating disorder     Environmental and seasonal allergies     Esophageal reflux 08/15/2013    GERD (gastroesophageal reflux disease) 8/15/2013    Headache(784.0)     Headache, tension-type     Hypertension     Hypothyroid     Idiopathic intracranial hypertension     Impulse control disorder     Lumbar degenerative disc disease 05/08/2014    Memory loss     Migraine     Nasal congestion      Nosebleed     Obesity     Obsessive-compulsive disorder     Osteoarthritis 5/8/2014    Otitis media     Overactive bladder     Panic attack     Psychiatric disorder     Psychiatric illness     PTSD (post-traumatic stress disorder)     Restless leg syndrome, controlled     Seasonal allergies     Self-injurious behavior     Sinus problem     Sinusitis     Sleep apnea     Sleep difficulties     Suicide and self-inflicted injury (HCC)     Suicide attempt (HCC)     Tinnitus     TMJ dysfunction     Tonsillitis     Transcranial Magnetic Stimulation 09/06/2021    Urinary incontinence     Urinary tract infection     Vision loss        Past Surgical History:   Procedure Laterality Date    COLONOSCOPY  10/5/2015    DENTAL SURGERY      wisdom teeth-at [age] years. Other surgery dental extraxtion of bone spur (10 years ago)    IR LUMBAR PUNCTURE  02/26/2019    SINUS ENDOSCOPY      SINUS SURGERY      TONSILLECTOMY         Family History   Problem Relation Age of Onset    Mental illness Mother     Depression Mother     Suicide Attempts Mother     Hypertension Mother     Diabetes Mother     Anxiety disorder Mother     Psychiatric Illness Mother     Schizoaffective Disorder  Mother     Anemia Mother     Colon polyps Mother     Transient ischemic attack Mother     Snoring Mother     Hypertension Father     Hypothyroidism Father     Thyroid disease Father     Sudden death Father     Psoriasis Father     Heart disease Father     Hypertension Brother     Cancer Maternal Grandmother     Heart disease Maternal Grandmother     Stroke Maternal Grandmother     Breast cancer Maternal Grandmother     Skin cancer Maternal Grandmother     Arthritis Maternal Grandmother     Pneumonia Maternal Grandfather     Cancer Maternal Grandfather     Dementia Maternal Grandfather     COPD Maternal Grandfather     Hearing loss Maternal Grandfather     Cancer Paternal Grandmother     Pneumonia Paternal Grandfather     Arthritis Family     Breast cancer  "Family     Osteopenia Family     Osteoporosis Family     Hypertension Family     Transient ischemic attack Family          Medications have been verified.        Objective   /98 (BP Location: Right arm, Patient Position: Sitting, Cuff Size: Adult)   Temp (!) 96.8 °F (36 °C) (Temporal)   Resp 18   Ht 5' 7\" (1.702 m)   Wt (!) 157 kg (345 lb 12.8 oz)   LMP 04/25/2025 (Approximate)   SpO2 99%   BMI 54.16 kg/m²   Patient's last menstrual period was 04/25/2025 (approximate).       Physical Exam     Physical Exam  Vitals and nursing note reviewed.   Constitutional:       General: She is not in acute distress.     Appearance: Normal appearance. She is obese. She is not ill-appearing, toxic-appearing or diaphoretic.   HENT:      Head: Normocephalic and atraumatic.      Right Ear: External ear normal.      Left Ear: External ear normal.      Nose: Nose normal.     Eyes:      Conjunctiva/sclera: Conjunctivae normal.       Cardiovascular:      Pulses:           Dorsalis pedis pulses are 2+ on the right side and 2+ on the left side.        Posterior tibial pulses are 2+ on the right side and 2+ on the left side.      Heart sounds: Normal heart sounds.   Pulmonary:      Effort: Pulmonary effort is normal.      Breath sounds: Normal breath sounds.     Musculoskeletal:      Right lower leg: Edema present.      Left lower leg: Edema present.     Skin:     General: Skin is warm and dry.      Capillary Refill: Capillary refill takes less than 2 seconds.      Comments: 5 cm oval bruise to the posterior aspect of the left knee.  Nontender to palpation.     Neurological:      General: No focal deficit present.      Mental Status: She is alert.     Psychiatric:         Mood and Affect: Mood normal.         Behavior: Behavior normal.                        [1]   Current Outpatient Medications:     acetaZOLAMIDE (DIAMOX) 250 mg tablet, Take 2 tablets (500 mg total) by mouth 2 (two) times a day, Disp: , Rfl:     benztropine " (COGENTIN) 1 mg tablet, Take 1 tab up to 3 times a day as needed for tremors, restlessness, muscle stiffness or anxiety, Disp: , Rfl:     Biotin 5000 MCG CAPS, Take by mouth in the morning, Disp: , Rfl:     cariprazine (VRAYLAR) 3 MG capsule, Take 3 mg by mouth daily at bedtime, Disp: , Rfl:     celecoxib (CeleBREX) 200 mg capsule, Take 1 capsule (200 mg total) by mouth 2 (two) times a day, Disp: 60 capsule, Rfl: 0    Cequa 0.09 % SOLN, Apply 1 drop to eye 2 (two) times a day, Disp: 60 each, Rfl: 0    cetirizine (ZyrTEC) 10 MG chewable tablet, Chew 10 mg in the morning., Disp: , Rfl:     Collagen-Vitamin C-Biotin (COLLAGEN PO), Take by mouth in the morning., Disp: , Rfl:     dihydroergotamine (MIGRANAL) 4 MG/ML nasal spray, 1 spray each nostril. Repeat in 15 minutes. May repeat entire process in 2 hours if no improvement. Max 8 sprays per day.  Max 2 days per week., Disp: , Rfl:     docusate sodium (COLACE) 100 mg capsule, Take 100 mg by mouth in the morning and 100 mg in the evening., Disp: , Rfl:     fluvoxaMINE (LUVOX) 25 MG tablet, Take 1 tablet by mouth in the morning, Disp: , Rfl:     folic acid (FOLVITE) 800 MCG tablet, Take 800 mcg by mouth in the morning., Disp: , Rfl:     furosemide (LASIX) 40 mg tablet, Take 1 tablet (40 mg total) by mouth if needed (take 1 tablet daily as needed for swelling), Disp: 10 tablet, Rfl: 0    gabapentin (NEURONTIN) 300 mg capsule, Take 1 capsule (300 mg total) by mouth daily after breakfast, Disp: , Rfl:     gabapentin (NEURONTIN) 600 MG tablet, Take 1.5 tablets (900 mg total) by mouth daily at bedtime, Disp: 45 tablet, Rfl: 0    ketorolac (TORADOL) 30 mg/mL injection, inject 30 milligrams (1 MILLILITER) intramuscularly if needed for...  (REFER TO PRESCRIPTION NOTES)., Disp: , Rfl:     lactulose (Constulose) 10 g/15 mL solution, take 30 MILLILITERS by mouth twice a day, Disp: 5400 mL, Rfl: 1    levothyroxine 112 mcg tablet, Take 1 tablet (112 mcg total) by mouth daily in the  early morning, Disp: 90 tablet, Rfl: 1    lubiprostone (AMITIZA) 24 mcg capsule, take 1 capsule by mouth twice a day with meals, Disp: 180 capsule, Rfl: 5    mexiletine (MEXITIL) 150 mg capsule, Take 150 mg by mouth, Disp: , Rfl:     Multiple Vitamin (MULTIVITAMIN) capsule, Take 1 capsule by mouth in the morning., Disp: , Rfl:     Nurtec 75 MG TBDP, Take 75 mg by mouth every other day, Disp: , Rfl:     omeprazole (PriLOSEC) 20 mg delayed release capsule, Take 1 capsule (20 mg total) by mouth 2 (two) times a day, Disp: 180 capsule, Rfl: 1    potassium chloride (MICRO-K) 10 MEQ CR capsule, Take 1 capsule (10 mEq total) by mouth if needed (take 1 daily on days you use lasix), Disp: 10 capsule, Rfl: 0    Riboflavin 100 MG TABS, , Disp: , Rfl:     tirzepatide (Zepbound) 15 mg/0.5 mL auto-injector, Inject 0.5 mL (15 mg total) under the skin once a week, Disp: 2 mL, Rfl: 3    topiramate (TOPAMAX) 100 mg tablet, Take 150 mg by mouth in the morning and 150 mg in the evening., Disp: , Rfl:     Vibegron 75 MG TABS, Take 75 mg by mouth in the morning, Disp: 30 tablet, Rfl: 3    cariprazine (Vraylar) 6 MG capsule, Take 1 capsule (6 mg total) by mouth daily, Disp: 30 capsule, Rfl: 0    cyclobenzaprine (FLEXERIL) 10 mg tablet, Take 1 tablet (10 mg total) by mouth 2 (two) times a day as needed for muscle spasms (Headaches) (Patient not taking: Reported on 4/21/2025), Disp: 20 tablet, Rfl: 0    DULoxetine (CYMBALTA) 60 mg delayed release capsule, Take 2 capsules (120 mg total) by mouth daily, Disp: 60 capsule, Rfl: 0    fluvoxaMINE (LUVOX) 50 mg tablet, Take 50 mg by mouth daily with breakfast (Patient not taking: Reported on 5/20/2025), Disp: , Rfl:     metFORMIN (GLUCOPHAGE) 500 mg tablet, Take 1 tablet (500 mg total) by mouth daily with breakfast, Disp: 90 tablet, Rfl: 1    naratriptan (AMERGE) 2.5 MG tablet, take 1 tablet by mouth AT ONSET OF MIGRAINE may repeat in 2 hours...  (REFER TO PRESCRIPTION NOTES)., Disp: , Rfl:

## 2025-05-21 ENCOUNTER — OFFICE VISIT (OUTPATIENT)
Dept: FAMILY MEDICINE CLINIC | Facility: CLINIC | Age: 40
End: 2025-05-21
Payer: COMMERCIAL

## 2025-05-21 VITALS
OXYGEN SATURATION: 98 % | TEMPERATURE: 95.1 F | HEART RATE: 75 BPM | DIASTOLIC BLOOD PRESSURE: 72 MMHG | HEIGHT: 67 IN | SYSTOLIC BLOOD PRESSURE: 118 MMHG | WEIGHT: 293 LBS | BODY MASS INDEX: 45.99 KG/M2

## 2025-05-21 DIAGNOSIS — E16.2 HYPOGLYCEMIA: ICD-10-CM

## 2025-05-21 DIAGNOSIS — G93.2 IIH (IDIOPATHIC INTRACRANIAL HYPERTENSION): ICD-10-CM

## 2025-05-21 DIAGNOSIS — Z09 HOSPITAL DISCHARGE FOLLOW-UP: Primary | ICD-10-CM

## 2025-05-21 DIAGNOSIS — G47.33 OSA (OBSTRUCTIVE SLEEP APNEA): ICD-10-CM

## 2025-05-21 DIAGNOSIS — R60.1 GENERALIZED EDEMA: ICD-10-CM

## 2025-05-21 DIAGNOSIS — F33.2 SEVERE EPISODE OF RECURRENT MAJOR DEPRESSIVE DISORDER, WITHOUT PSYCHOTIC FEATURES (HCC): ICD-10-CM

## 2025-05-21 DIAGNOSIS — G43.709 CHRONIC MIGRAINE WITHOUT AURA WITHOUT STATUS MIGRAINOSUS, NOT INTRACTABLE: ICD-10-CM

## 2025-05-21 LAB
ATRIAL RATE: 61 BPM
P AXIS: -1 DEGREES
PR INTERVAL: 170 MS
QRS AXIS: 2 DEGREES
QRSD INTERVAL: 98 MS
QT INTERVAL: 396 MS
QTC INTERVAL: 398 MS
T WAVE AXIS: 19 DEGREES
VENTRICULAR RATE: 61 BPM

## 2025-05-21 PROCEDURE — 99496 TRANSJ CARE MGMT HIGH F2F 7D: CPT | Performed by: FAMILY MEDICINE

## 2025-05-21 PROCEDURE — 93010 ELECTROCARDIOGRAM REPORT: CPT | Performed by: INTERNAL MEDICINE

## 2025-05-21 RX ORDER — POTASSIUM CHLORIDE 750 MG/1
CAPSULE, EXTENDED RELEASE ORAL
Qty: 10 CAPSULE | Refills: 0 | OUTPATIENT
Start: 2025-05-21

## 2025-05-21 RX ORDER — FUROSEMIDE 40 MG/1
TABLET ORAL
Qty: 10 TABLET | Refills: 0 | OUTPATIENT
Start: 2025-05-21

## 2025-05-21 NOTE — ASSESSMENT & PLAN NOTE
- Following psychiatry at [facility], receiving therapy weekly, following-up with psychiatrist every month  - Currently maintained on Celebrex 200 mg twice daily, Vraylar 3 mg at bedtime

## 2025-05-21 NOTE — PROGRESS NOTES
Transition of Care Visit:  Name: Gita Rendon      : 1985      MRN: [id number]  Encounter Provider: Herlinda Gleason MD  Encounter Date: 2025   Encounter department: [facility]    Assessment & Plan  Hospital discharge follow-up  Gita Rendon is a 39 y.o. female with CM, IIH, depression, anxiety, OCD, constipation, overactive bladder, CAIO, GERD who presents for TCM visit. Patient was admitted at [facility] on -  for exacerbation of migraine headache.      - Patient reports that her symptoms have overall significantly improved since discharge.  She has not experienced any migraine headaches since discharge.  -Currently maintained on benztropine, DHE nasal spray, mexiletine 150 mg daily, Nurtec 75 mg every other day  -Has follow-up with neurology in 1 week with the PA, 2.5 weeks with a physician  - Continue to follow-up with neurology  - Patient reports lower extremity edema since discharge which has currently been improving.  BNP normal, kidney function stable, no acute DVT.  Suspect this is secondary to the steroid use in the hospital.  -Patient also reports on and off tachycardia since discharge and wonders if this is secondary to benztropine.  Recommend discussing this with neurologist in upcoming visit.  Patient's heart rate is within normal limits during this visit.       Chronic migraine without aura without status migrainosus, not intractable  - See assessment plan under hospital discharge follow-up       IIH (idiopathic intracranial hypertension)  - Retained on Diamox 500 mg twice daily       Hypoglycemia  - Patient reports having had hypoglycemic events during hospitalization  -Metformin and Zepbound was discontinued during hospital stay  -Patient had contacted bariatric medicine who recommended to discontinue metformin and go down on the dose of Zepbound from 15 mg to 10 mg weekly  -Advised patient to use a glucometer and monitor her fasting  blood glucose daily  -Advised to call patient if fasting blood glucose consistently less than 60       CAIO (obstructive sleep apnea)  - Following sleep medicine, not maintained on CPAP therapy       Severe episode of recurrent major depressive disorder, without psychotic features (HCC)  - Following psychiatry at [facility], receiving therapy weekly, following-up with psychiatrist every month  - Currently maintained on Celebrex 200 mg twice daily, Vraylar 3 mg at bedtime              History of Present Illness     Transitional Care Management Review:   Gita Rendon is a 39 y.o. female here for TCM follow up.     During the TCM phone call patient stated:  TCM Call (since 5/7/2025)       Hospital care reviewed  Records reviewed    Date of Admission  05/12/25    Date of discharge  05/16/25    [facility]    Were the patients medications reviewed and updated  No          TCM Call (since 5/7/2025)       Post hospital issues  None    Scheduled for follow up?  Yes    Patients specialists  Neurologist    Did you obtain your prescribed medications  Yes    Do you need help managing your prescriptions or medications  No    Is transportation to your appointment needed  No    I have advised the patient to call PCP with any new or worsening symptoms  Xochitl Amaral    Living Arrangements  Alone    Support System      The type of support provided  Emotional    Do you have social support  Yes, a little    Are you recieving home care services  No          Gita Rendon is a 39 y.o. female with CM, IIH, depression, anxiety, OCD, constipation, overactive bladder, CAIO, GERD who presents for TCM visit. Patient was admitted at [facility] on 5/12- 5/16 for exacerbation of migraine headache.  Patient reports that she was initiated on new regimen since discharge.  Patient states that she was placed on a PICC line and was given lidocaine infusion along with steroid during her  "hospitalization.  Patient reports that she has not had any headaches since.  She states that this is the best med regimen so far.  Patient states that she is experienced on and off tachycardia at home and wonders if this is from Cogentin that was prescribed at the time of discharge.  Patient also reports that she has been experiencing the lower extremity edema and was seen in the ER after discharge.  Patient states that her lower extremity edema has been improving so far, much less so than what she experienced at the time of ER visit.  Patient reports that she was advised by her bariatric medicine to monitor her blood glucose levels with glucometer.  She states that she has not yet started monitoring them as she has been quite busy.  Patient is following [facility] for mental health.  Patient states that her next follow up neurology is in a week and then 2.5 weeks later with the physician.  No other acute concerns today.        Review of Systems   Constitutional:  Negative for chills and fever.   HENT:  Negative for ear pain and sore throat.    Eyes:  Negative for pain and visual disturbance.   Respiratory:  Negative for cough and shortness of breath.    Cardiovascular:  Negative for chest pain and palpitations.   Gastrointestinal:  Negative for abdominal pain and vomiting.   Genitourinary:  Negative for dysuria and hematuria.   Musculoskeletal:  Negative for arthralgias and back pain.   Skin:  Negative for color change and rash.   Neurological:  Negative for seizures and syncope.   All other systems reviewed and are negative.    Objective   /72   Pulse 75   Temp (!) 95.1 °F (35.1 °C) (Tympanic)   Ht 5' 7\" (1.702 m)   Wt (!) 157 kg (347 lb 3.2 oz)   LMP 04/25/2025 (Approximate)   SpO2 98%   BMI 54.38 kg/m²     Physical Exam  Vitals and nursing note reviewed.   Constitutional:       General: She is not in acute distress.     Appearance: She is well-developed.   HENT:      Head: Normocephalic and atraumatic. "     Eyes:      Conjunctiva/sclera: Conjunctivae normal.       Cardiovascular:      Rate and Rhythm: Normal rate and regular rhythm.      Heart sounds: No murmur heard.  Pulmonary:      Effort: Pulmonary effort is normal. No respiratory distress.      Breath sounds: Normal breath sounds.   Abdominal:      Palpations: Abdomen is soft.      Tenderness: There is no abdominal tenderness.     Musculoskeletal:         General: No swelling.      Cervical back: Neck supple.     Skin:     General: Skin is warm and dry.      Capillary Refill: Capillary refill takes less than 2 seconds.     Neurological:      Mental Status: She is alert and oriented to person, place, and time.     Psychiatric:         Mood and Affect: Mood normal.       Medications have been reviewed by provider in current encounter

## 2025-05-23 NOTE — PATIENT COMMUNICATION
Patient calling with update after being told to take Lasix 40 mg daily. She is still taking the 40 mg and reports she is down 3 lbs and is doing better. Should she continue the 40 mg daily?  Please advise

## 2025-05-24 ENCOUNTER — TRANSCRIBE ORDERS (OUTPATIENT)
Dept: LAB | Facility: CLINIC | Age: 40
End: 2025-05-24

## 2025-05-24 ENCOUNTER — APPOINTMENT (OUTPATIENT)
Dept: LAB | Facility: CLINIC | Age: 40
End: 2025-05-24
Payer: COMMERCIAL

## 2025-05-24 DIAGNOSIS — G43.919 INTRACTABLE MIGRAINE WITHOUT STATUS MIGRAINOSUS, UNSPECIFIED MIGRAINE TYPE: Primary | ICD-10-CM

## 2025-05-24 DIAGNOSIS — G43.919 INTRACTABLE MIGRAINE WITHOUT STATUS MIGRAINOSUS, UNSPECIFIED MIGRAINE TYPE: ICD-10-CM

## 2025-05-24 PROCEDURE — 36415 COLL VENOUS BLD VENIPUNCTURE: CPT

## 2025-05-24 PROCEDURE — 80299 QUANTITATIVE ASSAY DRUG: CPT

## 2025-05-27 ENCOUNTER — NURSE TRIAGE (OUTPATIENT)
Age: 40
End: 2025-05-27

## 2025-05-27 NOTE — TELEPHONE ENCOUNTER
"REASON FOR CONVERSATION: Headache    SYMPTOMS: ongoing headache for 4 days. Pain rating of 6-7/10. Sensitivity to light and smells, blurry vision, nausea, fatigue.    OTHER HEALTH INFORMATION: Patient states she was recently admitted at [facility] headache unit where she received treatment and felt better for a few days. Patient is having on going headache with no relief    PROTOCOL DISPOSITION: Go to ED/[facility] Now (Or to Office with PCP Approval)    CARE ADVICE PROVIDED: Patient advised to go to the ED for further evaluation. Encouraged to increase fluid intake. Rest in a dark and quiet setting.    PRACTICE FOLLOW-UP: Please advise with any further recommendations for patient.        Reason for Disposition   SEVERE headache, states 'worst headache' of life    Answer Assessment - Initial Assessment Questions  1. LOCATION: \"Where does it hurt?\"       Whole head hurts    2. ONSET: \"When did the headache start?\" (e.g., minutes, hours, days)       Saturday     3. PATTERN: \"Does the pain come and go, or has it been constant since it started?\"      Constant     4. SEVERITY: \"How bad is the pain?\" and \"What does it keep you from doing?\"  (e.g., Scale 1-10; mild, moderate, or severe)      6-7/10    5. RECURRENT SYMPTOM: \"Have you ever had headaches before?\" If Yes, ask: \"When was the last time?\" and \"What happened that time?\"       Patient reports ongoing headache problem    6. CAUSE: \"What do you think is causing the headache?\"      Patient felt really tired and then the headache came     7. MIGRAINE: \"Have you been diagnosed with migraine headaches?\" If Yes, ask: \"Is this headache similar?\"       Yes    8. HEAD INJURY: \"Has there been any recent injury to the head?\"       Denies    9. OTHER SYMPTOMS: \"Do you have any other symptoms?\" (e.g., fever, stiff neck, eye pain, sore throat, cold symptoms)      Blurry vision, nausea, sensitivity to light and smells , fatigue    10. PREGNANCY: \"Is there any chance you are pregnant?\" " "\"When was your last menstrual period?\"        5/20/25, denies    Protocols used: Headache-Adult-[place]    "

## 2025-05-29 ENCOUNTER — HOSPITAL ENCOUNTER (OUTPATIENT)
Dept: MAMMOGRAPHY | Facility: HOSPITAL | Age: 40
Discharge: HOME | End: 2025-05-29
Payer: COMMERCIAL

## 2025-05-29 VITALS — HEIGHT: 67 IN | BODY MASS INDEX: 45.99 KG/M2 | WEIGHT: 293 LBS

## 2025-05-29 DIAGNOSIS — Z12.31 ENCOUNTER FOR SCREENING MAMMOGRAM FOR BREAST CANCER: ICD-10-CM

## 2025-05-29 PROCEDURE — 77063 BREAST TOMOSYNTHESIS BI: CPT

## 2025-05-29 PROCEDURE — 77067 SCR MAMMO BI INCL CAD: CPT

## 2025-05-30 ENCOUNTER — RESULTS FOLLOW-UP (OUTPATIENT)
Dept: OBGYN CLINIC | Facility: CLINIC | Age: 40
End: 2025-05-30

## 2025-06-03 LAB — MISCELLANEOUS LAB TEST RESULT: NORMAL

## 2025-06-05 DIAGNOSIS — N32.81 OVERACTIVE BLADDER: ICD-10-CM

## 2025-06-13 ENCOUNTER — TELEPHONE (OUTPATIENT)
Age: 40
End: 2025-06-13

## 2025-06-13 NOTE — TELEPHONE ENCOUNTER
Called patient and left voicemail informing them that provider will no longer be available on 08/20/25 at 1 pm. In addition, I let them know appointment has been rescheduled to 08/27/25 at 8 am. Requested patient to call back office at [phone] if this doesn't work with their schedule. An Appointment reminder card sent out to patient with updated information.

## 2025-06-17 ENCOUNTER — TELEPHONE (OUTPATIENT)
Dept: BARIATRICS | Facility: CLINIC | Age: 40
End: 2025-06-17

## 2025-06-17 ENCOUNTER — APPOINTMENT (OUTPATIENT)
Dept: LAB | Facility: CLINIC | Age: 40
End: 2025-06-17
Attending: STUDENT IN AN ORGANIZED HEALTH CARE EDUCATION/TRAINING PROGRAM
Payer: COMMERCIAL

## 2025-06-17 DIAGNOSIS — E66.813 CLASS 3 OBESITY: Primary | ICD-10-CM

## 2025-06-17 DIAGNOSIS — G43.719 INTRACTABLE CHRONIC MIGRAINE WITHOUT AURA AND WITHOUT STATUS MIGRAINOSUS: ICD-10-CM

## 2025-06-17 PROCEDURE — 36415 COLL VENOUS BLD VENIPUNCTURE: CPT

## 2025-06-17 PROCEDURE — 80299 QUANTITATIVE ASSAY DRUG: CPT

## 2025-06-17 RX ORDER — TIRZEPATIDE 12.5 MG/.5ML
12.5 INJECTION, SOLUTION SUBCUTANEOUS WEEKLY
Qty: 2 ML | Refills: 0 | Status: SHIPPED | OUTPATIENT
Start: 2025-06-17 | End: 2025-07-15

## 2025-06-17 NOTE — TELEPHONE ENCOUNTER
Approved   
How Many Skin Cancers Have You Had?: more than one
Prior auth started    Gita Rendon (Key: [id number])  Zepbound 12.5mg  
What Is The Reason For Today's Visit?: History of Non-Melanoma Skin Cancer
When Was Your Last Cancer Diagnosed?: 2021

## 2025-06-20 ENCOUNTER — TELEPHONE (OUTPATIENT)
Age: 40
End: 2025-06-20

## 2025-06-20 NOTE — TELEPHONE ENCOUNTER
I called and spoke with patient and rescheduled appointment due to provider will not be in office. Patient agreed to the change.

## 2025-06-24 DIAGNOSIS — G93.2 INTRACRANIAL HYPERTENSION: ICD-10-CM

## 2025-06-24 RX ORDER — ACETAZOLAMIDE 250 MG/1
750 TABLET ORAL 2 TIMES DAILY
Qty: 540 TABLET | Refills: 1 | Status: SHIPPED | OUTPATIENT
Start: 2025-06-24

## 2025-06-26 ENCOUNTER — OFFICE VISIT (OUTPATIENT)
Age: 40
End: 2025-06-26
Attending: FAMILY MEDICINE
Payer: COMMERCIAL

## 2025-06-26 VITALS
BODY MASS INDEX: 45.99 KG/M2 | WEIGHT: 293 LBS | DIASTOLIC BLOOD PRESSURE: 70 MMHG | HEART RATE: 67 BPM | HEIGHT: 67 IN | SYSTOLIC BLOOD PRESSURE: 110 MMHG

## 2025-06-26 DIAGNOSIS — G43.709 CHRONIC MIGRAINE WITHOUT AURA WITHOUT STATUS MIGRAINOSUS, NOT INTRACTABLE: ICD-10-CM

## 2025-06-26 DIAGNOSIS — M25.541 ARTHRALGIA OF BOTH HANDS: ICD-10-CM

## 2025-06-26 DIAGNOSIS — R32 URINARY INCONTINENCE: Primary | ICD-10-CM

## 2025-06-26 DIAGNOSIS — R21 MALAR RASH: ICD-10-CM

## 2025-06-26 DIAGNOSIS — M25.542 ARTHRALGIA OF BOTH HANDS: ICD-10-CM

## 2025-06-26 DIAGNOSIS — R53.83 FATIGUE: ICD-10-CM

## 2025-06-26 PROCEDURE — 99205 OFFICE O/P NEW HI 60 MIN: CPT | Performed by: PSYCHIATRY & NEUROLOGY

## 2025-06-26 PROCEDURE — 99215 OFFICE O/P EST HI 40 MIN: CPT | Performed by: PSYCHIATRY & NEUROLOGY

## 2025-06-26 RX ORDER — AMANTADINE HYDROCHLORIDE 100 MG/1
100 TABLET ORAL 2 TIMES DAILY
COMMUNITY
Start: 2025-06-13 | End: 2026-06-13

## 2025-06-26 NOTE — PROGRESS NOTES
Neurology New Patient Ambulatory Visit Note    Name: Gita Rendon       : 1985       MRN: [id number]   Encounter Provider: Melina Rodriguez MD   Encounter Date: 2025  Encounter department: [facility] NEUROLOGY [facility]    History of Present Illness     Gita Rendon is a 39 y.o. female with idiopathic intracranial hypertension who presents with persistent headaches. She was referred by her PCP for further evaluation of her headaches and associated symptoms.    She has experienced headaches since childhood, characterized by a pressure-like sensation starting in the back of the neck and radiating through the eyes. These headaches are associated with photophobia, phonophobia, osmophobia, and nausea, occurring daily with varying intensity, reaching up to 7/10 on the pain scale. Postdrome symptoms include dizziness and extreme fatigue lasting up to two days. Previous treatments, including Botox, were ineffective, but current Vyepti infusions and trigger point injections provide some relief.    She has a history of idiopathic intracranial hypertension diagnosed at age 15, with a spinal tap a year and a half ago showing an opening pressure of 14. She is uncertain if pressure fluctuations contribute to her symptoms.    Significant fatigue accompanies her headaches, worsening about five years ago after a stressful relationship. She experiences periods of extreme fatigue, sleeping for days and losing awareness of time. Amantadine has been recently started, providing some improvement.    She experiences urinary symptoms, particularly nocturnal incontinence, and uses briefs. Gemtesa provides partial relief, but symptoms persist, especially at night. A cystoscopy is scheduled for further investigation.    She reports nerve issues, including difficulty holding objects during headaches and facial flushing. Hair loss is attributed to weight loss medication. She has a history of back issues with  past imaging showing damage at L1-L2.    Her social history includes high caffeine intake through diet iced tea and a history of anxiety and OCD affecting sleep. She does not smoke or use birth control.      Headache Description:    Onset of headaches: gradual --7 years back she was in a bad relationship and would get really bad migraine headaches.   Location of headaches: bilateral, occipital, frontal, and retro-orbital  Radiation: Yes   Quality of the pain: pressure  Intensity of the headache: At present: 4/10;  At its worst:  7/10  Duration of the headaches:hour(s)  Frequency of the headaches:daily  Presence of aura:  Yes, fatigue  Associated symptoms with headaches: +nausea, +light sensitivity , +sound sensitivity, and dizziness   Triggers:No   Positional component: Yes   Alleviating factors: Yes,    Any specific time of the day when get the headaches: no particular time of day    Red Flags for headache:  Age <5 or >50: Yes, started when she was 4 years old.   First worst headaches: No  Maximal at intensity: No  Change in pattern: Yes  New headache in pregnancy, cancer or immunocompromised patient: No  Loss of consciousness or convulsions: No  Headache triggered by exertion, Valsalva maneuver or sexual activity: No  Abnormal exam: please see below in Neurological examination.    Social history:   Family history of migraine headaches: No  History of neck and head trauma: No  Smoking status: No  Oral contraceptive use: No      Life style factors:  -Hydration: inadequate  -Sleep: inadequate  -Caffeine intake: drinks a lot of diet soda         Treatment:      Vypeti 300 mg every 3 month   Has tried Botox in 2021 ([place]) and it did not helped.        Other significant co morbidities:  IIH  diagnosed when she was 15 years old. Spinal tap last year and was Diamox (CSF pressure 14).       Prior neurology notes:   Chronic migraine: That presents for a follow-up evaluation with regard to chronic migraine headaches.   "She continues to experience more or less daily headache which can be activity related, she also experiences more significant breakthrough migraines every few weeks in addition to any time when she is more fluid overloaded or around her menstrual cycle.  She reports that her migraines are better when she has reduced drainage/irritation in the sinuses.  -We will plan to continue her typical migraine preventative medicines including Celebrex, gabapentin, Emgality, propranolol, Nurtec, Topamax  -I have sent a message to the nephrology group at her request to discuss the possibility of a repeat trial of Diamox given that she was only able to tolerate it for a very short time.  If that is a reasonable option for her we will work with the nephrology group with regard to dosing and whether or not she should be taking Lasix/spironolactone at the same time.  If she is unable to take Diamox without reducing her other diuretic medicines we would likely consider switching Emgality to Vyepti infusion  -To treat a headache as it occurs she could use either 1 tablet of Nurtec if she has not already taken it that day, or 1 tablet of Maxalt.  Maxalt can be repeated once in 2 hours with a maximum of 3 tablets in any 24-hour timeframe.  Either medication could be taken with Tylenol or Benadryl as she sees fit.  -I would like for her to keep track of her migraines, particularly the more severe breakthrough migraines, so that we can determine any specific patterns that can be used to our benefit in getting this under reasonable control     She will return to the office in 4 months time to see me directly but I will be in touch with her soon as we hear back from the nephrology group.           Objective   /70 (BP Location: Left arm, Patient Position: Sitting, Cuff Size: Adult)   Pulse 67   Ht 5' 7\" (1.702 m)   Wt (!) 155 kg (342 lb)   BMI 53.56 kg/m²        Neurological examination:     Mental status exam: Alert, oriented to " "time place and person     Cranial nerve exam:    I Not tested   II Normal visual fields to finger counting.   II, Ill,  IV, VI Pupils were symmetric, briskly reactive. No afferent pupillary defect.  Eye movements are full with mild end gaze nystagmus on the left side . No ptosis.   V Facial sensation were intact   VII Facial movements were symmetrical    VIII Hearing was intact   IX, X Intact   XI Shoulder shrug and head turn was normal   XII Tongue protrusion is in the mid line.          Motor exam      Arm Right  Left Leg Right  Left   Deltoid 5/5 5/5 lliopsoas 5/5 5/5   Biceps 5/5 5/5 Quads 5/5 5/5   Triceps 5/5 5/5 Hamstrings 5/5 5/5   Wrist Extension 5/5 5/5 Ankle Dorsi Flexion 5/5 5/5   Wrist Flexion 5/5 5/5 Ankle Plantar Flexion 5/5 5/5            Deep Tendon reflexes:       Brachioradialis Biceps Triceps Patellar Achilles   Right 0 0 0 0 0   Left 0 0 0 0 0      Unable to elicit reflexes due to the body habitus     Sensory exam:  Sensory deficits in the right lower extremity.    Coordination:  Normal finger to nose testing  Finger tapping test was normal    Gait and Station:    normal        Pertinent diagnostic testing:  Labs:  Lab Results   Component Value Date    WBC 11.89 (H) 05/20/2025     Lab Results   Component Value Date    HGB 12.0 05/20/2025     Lab Results   Component Value Date     05/20/2025     Lab Results   Component Value Date    LYMPHSABS 3.35 05/20/2025     No results found for: \"LABLYMP\"  Lab Results   Component Value Date    EOSABS 0.49 05/20/2025     No components found for: \"NEUTROPHILS\"    No results found for: \"LABMONO\"         No results found for: \"LABGLUC\"  Lab Results   Component Value Date    CREATININE 0.85 05/20/2025       Lab Results   Component Value Date    AST 13 05/20/2025     Lab Results   Component Value Date    ALT 15 05/20/2025     Lab Results   Component Value Date    ALKPHOS 62 05/20/2025     Lab Results   Component Value Date    AST 13 05/20/2025        Lab " "Results   Component Value Date    FOLATE >22.3 04/06/2024     Lab Results   Component Value Date    EHGODZAZ93 394 10/25/2024     No results found for: \"COPPER\"  No results found for: \"METHYLMALON\"  No results found for: \"VITAMINB6\"  No components found for: \"DNERODNO4KS\"  No components found for: \"VITAMINE\"  No components found for: \"ANADIRECT\"     No components found for: \"HIVPCR\"     No components found for: \"GLUCOSECSF\"  No components found for: \"CSFINTERP\"  No results found for: \"RBCCSF\"  Lab Results   Component Value Date    WBCCSF 2 02/26/2019     No results found for: \"IGGSYNRATE\"  No components found for: \"IGGINDEX\"  Lab Results   Component Value Date    PROTEINCSF 41 02/26/2019     No components found for: \"CSFMONO\"  No components found for: \"FUNGUSCX\"  No components found for: \"CSFCS\"  No results found for: \"CRYPTOAG\"  No components found for: \"DRLCSF\"  No components found for: \"FLOWCYTEVAL\"     No results found for: \"TSH\", \"KA4DLKWJ\", \"LABRPR\", \"HEPBSAG\", \"HEPBSAB\", \"HEPCAB\"         Neuroimaging:                          Results for orders placed during the hospital encounter of 10/09/24    CT head wo contrast    Impression  No acute intracranial abnormality.            Workstation performed: [id number]                  Assessment & Plan  Chronic migraine without aura without status migrainosus, not intractable  Ms. Rendon is a very pleasant 39 y.o. female presenting with Chronic migraines with daily head pain, often starting in the back of the neck and radiating through the eyes. Associated symptoms include photophobia, phonophobia, osmophobia, nausea, and fatigue. Previous treatments include DHE, Vyepti, and trigger point injections, which have provided some relief. Botox was ineffective. Experiences postdrome with dizziness and extreme fatigue lasting up to two days. Suspected inflammatory component as steroids have previously provided significant relief.  - Order MRI of the brain and cervical spine to " rule out multiple sclerosis and assess for any inflammatory component.  - Continue Vyepti infusions and trigger point injections.  - Communicate with Dr. Huerta and Dr. Mccarty regarding treatment and progress.         Urinary incontinence  Urinary incontinence, especially nocturnal, requiring briefs. Symptoms have worsened over time. Currently on Gemtesa, which provides limited relief. Suspected possible neurological component, potentially related to spinal issues.  - Proceed with scheduled cystoscopy to investigate the cause.  - Order MRI of the cervical and thoracic spine to assess for any neurological causes.  Orders:    MRI brain w wo contrast; Future    MRI cervical spine w wo contrast; Future    MRI thoracic spine w wo contrast; Future    MRI lumbar spine w wo contrast; Future    Fatigue  Severe fatigue, particularly associated with migraines. Worsened significantly after a stressful relationship five years ago. Currently on amantadine, which has provided some improvement. Suspected relation to the inflammatory process of migraines.  - Continue amantadine for fatigue management.  - Order MRI of the brain and cervical spine to rule out multiple sclerosis and assess for any inflammatory component.             Ms. Rendon will Return if symptoms worsen or fail to improve.    Administrative Statements   The management plan was discussed in detail with the patient and all questions were answered.     Ms. Rendon was encouraged to contact our office with any questions or concerns and to contact the clinic or go to the nearest emergency room if symptoms change or worsen.     I have spent a total time of 62 minutes in caring for this patient on the day of the visit/encounter including Diagnostic results, Prognosis, Risks and benefits of tx options, Instructions for management, Patient and family education, Importance of tx compliance, Risk factor reductions, Impressions, Counseling / Coordination of care, Documenting in  the medical record, Reviewing/placing orders in the medical record (including tests, medications, and/or procedures), and Obtaining or reviewing history  .         Melina Villa MD.   Staff Neurologist,   Neuroimmunology and Neuroinfectious disease  06/26/25     This report has been created through the use of voice recognition/text compilation software.  Typographical and content errors may occur with this process.  While efforts are made to detect and correct such errors, in some cases errors will persist.  For this reason, wording in this document should be considered in the proper context and not strictly verbatim.  If, when reviewing the document, an error is discovered, please let the office know at [phone]

## 2025-06-26 NOTE — LETTER
2025     Norma Traore MD  [address] [facility] [id number]    Patient: Gita Rendon   YOB: 1985   Date of Visit: 2025       Dear MD Avelina Painting:    Thank you for referring Gita Rendon to me for evaluation. Below are my notes for this consultation.    If you have questions, please do not hesitate to call me. I look forward to following your patient along with you.         Sincerely,        Melina Rodriguez MD        CC: Avelina Rodriguez MD  2025  2:00 PM  Sign when Signing Visit  Neurology New Patient Ambulatory Visit Note    Name: Gita Rendon       : 1985       MRN: [id number]   Encounter Provider: Melina Rodriguez MD   Encounter Date: 2025  Encounter department: [facility]    History of Present Illness    Gita Rendon is a 39 y.o. female with idiopathic intracranial hypertension who presents with persistent headaches. She was referred by her PCP for further evaluation of her headaches and associated symptoms.    She has experienced headaches since childhood, characterized by a pressure-like sensation starting in the back of the neck and radiating through the eyes. These headaches are associated with photophobia, phonophobia, osmophobia, and nausea, occurring daily with varying intensity, reaching up to 7/10 on the pain scale. Postdrome symptoms include dizziness and extreme fatigue lasting up to two days. Previous treatments, including Botox, were ineffective, but current Vyepti infusions and trigger point injections provide some relief.    She has a history of idiopathic intracranial hypertension diagnosed at age 15, with a spinal tap a year and a half ago showing an opening pressure of 14. She is uncertain if pressure fluctuations contribute to her symptoms.    Significant fatigue accompanies her headaches, worsening about five years ago after  a stressful relationship. She experiences periods of extreme fatigue, sleeping for days and losing awareness of time. Amantadine has been recently started, providing some improvement.    She experiences urinary symptoms, particularly nocturnal incontinence, and uses briefs. Gemtesa provides partial relief, but symptoms persist, especially at night. A cystoscopy is scheduled for further investigation.    She reports nerve issues, including difficulty holding objects during headaches and facial flushing. Hair loss is attributed to weight loss medication. She has a history of back issues with past imaging showing damage at L1-L2.    Her social history includes high caffeine intake through diet iced tea and a history of anxiety and OCD affecting sleep. She does not smoke or use birth control.      Headache Description:    Onset of headaches: gradual --7 years back she was in a bad relationship and would get really bad migraine headaches.   Location of headaches: bilateral, occipital, frontal, and retro-orbital  Radiation: Yes   Quality of the pain: pressure  Intensity of the headache: At present: 4/10;  At its worst:  7/10  Duration of the headaches:hour(s)  Frequency of the headaches:daily  Presence of aura:  Yes, fatigue  Associated symptoms with headaches: +nausea, +light sensitivity , +sound sensitivity, and dizziness   Triggers:No   Positional component: Yes   Alleviating factors: Yes,    Any specific time of the day when get the headaches: no particular time of day    Red Flags for headache:  Age <5 or >50: Yes, started when she was 4 years old.   First worst headaches: No  Maximal at intensity: No  Change in pattern: Yes  New headache in pregnancy, cancer or immunocompromised patient: No  Loss of consciousness or convulsions: No  Headache triggered by exertion, Valsalva maneuver or sexual activity: No  Abnormal exam: please see below in Neurological examination.    Social history:   Family history of migraine  headaches: No  History of neck and head trauma: No  Smoking status: No  Oral contraceptive use: No      Life style factors:  -Hydration: inadequate  -Sleep: inadequate  -Caffeine intake: drinks a lot of diet soda         Treatment:      Vypeti 300 mg every 3 month   Has tried Botox in 2021 (Jannette) and it did not helped.        Other significant co morbidities:  IIH  diagnosed when she was 15 years old. Spinal tap last year and was Diamox (CSF pressure 14).       Prior neurology notes:   Chronic migraine: That presents for a follow-up evaluation with regard to chronic migraine headaches.  She continues to experience more or less daily headache which can be activity related, she also experiences more significant breakthrough migraines every few weeks in addition to any time when she is more fluid overloaded or around her menstrual cycle.  She reports that her migraines are better when she has reduced drainage/irritation in the sinuses.  -We will plan to continue her typical migraine preventative medicines including Celebrex, gabapentin, Emgality, propranolol, Nurtec, Topamax  -I have sent a message to the nephrology group at her request to discuss the possibility of a repeat trial of Diamox given that she was only able to tolerate it for a very short time.  If that is a reasonable option for her we will work with the nephrology group with regard to dosing and whether or not she should be taking Lasix/spironolactone at the same time.  If she is unable to take Diamox without reducing her other diuretic medicines we would likely consider switching Emgality to Vyepti infusion  -To treat a headache as it occurs she could use either 1 tablet of Nurtec if she has not already taken it that day, or 1 tablet of Maxalt.  Maxalt can be repeated once in 2 hours with a maximum of 3 tablets in any 24-hour timeframe.  Either medication could be taken with Tylenol or Benadryl as she sees fit.  -I would like for her to keep track of  "her migraines, particularly the more severe breakthrough migraines, so that we can determine any specific patterns that can be used to our benefit in getting this under reasonable control     She will return to the office in 4 months time to see me directly but I will be in touch with her soon as we hear back from the nephrology group.           Objective  /70 (BP Location: Left arm, Patient Position: Sitting, Cuff Size: Adult)   Pulse 67   Ht 5' 7\" (1.702 m)   Wt (!) 155 kg (342 lb)   BMI 53.56 kg/m²        Neurological examination:     Mental status exam: Alert, oriented to time place and person     Cranial nerve exam:    I Not tested   II Normal visual fields to finger counting.   II, Ill,  IV, VI Pupils were symmetric, briskly reactive. No afferent pupillary defect.  Eye movements are full with mild end gaze nystagmus on the left side . No ptosis.   V Facial sensation were intact   VII Facial movements were symmetrical    VIII Hearing was intact   IX, X Intact   XI Shoulder shrug and head turn was normal   XII Tongue protrusion is in the mid line.          Motor exam      Arm Right  Left Leg Right  Left   Deltoid 5/5 5/5 lliopsoas 5/5 5/5   Biceps 5/5 5/5 Quads 5/5 5/5   Triceps 5/5 5/5 Hamstrings 5/5 5/5   Wrist Extension 5/5 5/5 Ankle Dorsi Flexion 5/5 5/5   Wrist Flexion 5/5 5/5 Ankle Plantar Flexion 5/5 5/5            Deep Tendon reflexes:       Brachioradialis Biceps Triceps Patellar Achilles   Right 0 0 0 0 0   Left 0 0 0 0 0      Unable to elicit reflexes due to the body habitus     Sensory exam:  Sensory deficits in the right lower extremity.    Coordination:  Normal finger to nose testing  Finger tapping test was normal    Gait and Station:    normal        Pertinent diagnostic testing:  Labs:  Lab Results   Component Value Date    WBC 11.89 (H) 05/20/2025     Lab Results   Component Value Date    HGB 12.0 05/20/2025     Lab Results   Component Value Date     05/20/2025     Lab Results " "  Component Value Date    LYMPHSABS 3.35 05/20/2025     No results found for: \"LABLYMP\"  Lab Results   Component Value Date    EOSABS 0.49 05/20/2025     No components found for: \"NEUTROPHILS\"    No results found for: \"LABMONO\"         No results found for: \"LABGLUC\"  Lab Results   Component Value Date    CREATININE 0.85 05/20/2025       Lab Results   Component Value Date    AST 13 05/20/2025     Lab Results   Component Value Date    ALT 15 05/20/2025     Lab Results   Component Value Date    ALKPHOS 62 05/20/2025     Lab Results   Component Value Date    AST 13 05/20/2025        Lab Results   Component Value Date    FOLATE >22.3 04/06/2024     Lab Results   Component Value Date    PEUKNETT11 394 10/25/2024     No results found for: \"COPPER\"  No results found for: \"METHYLMALON\"  No results found for: \"VITAMINB6\"  No components found for: \"QKXCEHYQ0CC\"  No components found for: \"VITAMINE\"  No components found for: \"ANADIRECT\"     No components found for: \"HIVPCR\"     No components found for: \"GLUCOSECSF\"  No components found for: \"CSFINTERP\"  No results found for: \"RBCCSF\"  Lab Results   Component Value Date    WBCCSF 2 02/26/2019     No results found for: \"IGGSYNRATE\"  No components found for: \"IGGINDEX\"  Lab Results   Component Value Date    PROTEINCSF 41 02/26/2019     No components found for: \"CSFMONO\"  No components found for: \"FUNGUSCX\"  No components found for: \"CSFCS\"  No results found for: \"CRYPTOAG\"  No components found for: \"DRLCSF\"  No components found for: \"FLOWCYTEVAL\"     No results found for: \"TSH\", \"QI7NUBNP\", \"LABRPR\", \"HEPBSAG\", \"HEPBSAB\", \"HEPCAB\"         Neuroimaging:                          Results for orders placed during the hospital encounter of 10/09/24    CT head wo contrast    Impression  No acute intracranial abnormality.            Workstation performed: [id number]                  Assessment & Plan  Chronic migraine without aura without status migrainosus, not intractable  Ms. Stack is " a very pleasant 39 y.o. female presenting with Chronic migraines with daily head pain, often starting in the back of the neck and radiating through the eyes. Associated symptoms include photophobia, phonophobia, osmophobia, nausea, and fatigue. Previous treatments include DHE, Vyepti, and trigger point injections, which have provided some relief. Botox was ineffective. Experiences postdrome with dizziness and extreme fatigue lasting up to two days. Suspected inflammatory component as steroids have previously provided significant relief.  - Order MRI of the brain and cervical spine to rule out multiple sclerosis and assess for any inflammatory component.  - Continue Vyepti infusions and trigger point injections.  - Communicate with Dr. Huerta and Dr. Mccarty regarding treatment and progress.         Urinary incontinence  Urinary incontinence, especially nocturnal, requiring briefs. Symptoms have worsened over time. Currently on Gemtesa, which provides limited relief. Suspected possible neurological component, potentially related to spinal issues.  - Proceed with scheduled cystoscopy to investigate the cause.  - Order MRI of the cervical and thoracic spine to assess for any neurological causes.  Orders:  •  MRI brain w wo contrast; Future  •  MRI cervical spine w wo contrast; Future  •  MRI thoracic spine w wo contrast; Future  •  MRI lumbar spine w wo contrast; Future    Fatigue  Severe fatigue, particularly associated with migraines. Worsened significantly after a stressful relationship five years ago. Currently on amantadine, which has provided some improvement. Suspected relation to the inflammatory process of migraines.  - Continue amantadine for fatigue management.  - Order MRI of the brain and cervical spine to rule out multiple sclerosis and assess for any inflammatory component.             Ms. Stack will Return if symptoms worsen or fail to improve.    Administrative Statements  The management plan was  discussed in detail with the patient and all questions were answered.     Ms. Rendon was encouraged to contact our office with any questions or concerns and to contact the clinic or go to the nearest emergency room if symptoms change or worsen.     I have spent a total time of 62 minutes in caring for this patient on the day of the visit/encounter including Diagnostic results, Prognosis, Risks and benefits of tx options, Instructions for management, Patient and family education, Importance of tx compliance, Risk factor reductions, Impressions, Counseling / Coordination of care, Documenting in the medical record, Reviewing/placing orders in the medical record (including tests, medications, and/or procedures), and Obtaining or reviewing history  .         Melina Villa MD.   Staff Neurologist,   Neuroimmunology and Neuroinfectious disease  06/26/25     This report has been created through the use of voice recognition/text compilation software.  Typographical and content errors may occur with this process.  While efforts are made to detect and correct such errors, in some cases errors will persist.  For this reason, wording in this document should be considered in the proper context and not strictly verbatim.  If, when reviewing the document, an error is discovered, please let the office know at [phone]

## 2025-06-30 LAB — MISCELLANEOUS LAB TEST RESULT: NORMAL

## 2025-06-30 NOTE — ASSESSMENT & PLAN NOTE
Ms. Rendon is a very pleasant 39 y.o. female presenting with Chronic migraines with daily head pain, often starting in the back of the neck and radiating through the eyes. Associated symptoms include photophobia, phonophobia, osmophobia, nausea, and fatigue. Previous treatments include DHE, Vyepti, and trigger point injections, which have provided some relief. Botox was ineffective. Experiences postdrome with dizziness and extreme fatigue lasting up to two days. Suspected inflammatory component as steroids have previously provided significant relief.  - Order MRI of the brain and cervical spine to rule out multiple sclerosis and assess for any inflammatory component.  - Continue Vyepti infusions and trigger point injections.  - Communicate with Dr. Huerta and Dr. Mccarty regarding treatment and progress.

## 2025-07-10 ENCOUNTER — HOSPITAL ENCOUNTER (OUTPATIENT)
Dept: MRI IMAGING | Facility: HOSPITAL | Age: 40
Discharge: HOME | End: 2025-07-10
Payer: COMMERCIAL

## 2025-07-10 ENCOUNTER — HOSPITAL ENCOUNTER (OUTPATIENT)
Dept: MRI IMAGING | Facility: HOSPITAL | Age: 40
Discharge: HOME | End: 2025-07-10
Attending: PSYCHIATRY & NEUROLOGY
Payer: COMMERCIAL

## 2025-07-10 DIAGNOSIS — R32 URINARY INCONTINENCE: ICD-10-CM

## 2025-07-10 PROCEDURE — A9585 GADOBUTROL INJECTION: HCPCS | Performed by: PSYCHIATRY & NEUROLOGY

## 2025-07-10 PROCEDURE — 72158 MRI LUMBAR SPINE W/O & W/DYE: CPT

## 2025-07-10 PROCEDURE — 72157 MRI CHEST SPINE W/O & W/DYE: CPT

## 2025-07-10 RX ORDER — GADOBUTROL 604.72 MG/ML
15 INJECTION INTRAVENOUS
Status: COMPLETED | OUTPATIENT
Start: 2025-07-10 | End: 2025-07-10

## 2025-07-10 RX ADMIN — GADOBUTROL 15 ML: 604.72 INJECTION INTRAVENOUS at 15:12

## 2025-07-11 ENCOUNTER — OFFICE VISIT (OUTPATIENT)
Dept: FAMILY MEDICINE CLINIC | Facility: CLINIC | Age: 40
End: 2025-07-11
Payer: COMMERCIAL

## 2025-07-11 VITALS
WEIGHT: 293 LBS | BODY MASS INDEX: 45.99 KG/M2 | OXYGEN SATURATION: 97 % | HEIGHT: 67 IN | TEMPERATURE: 97.8 F | SYSTOLIC BLOOD PRESSURE: 102 MMHG | HEART RATE: 93 BPM | DIASTOLIC BLOOD PRESSURE: 62 MMHG

## 2025-07-11 DIAGNOSIS — Z91.09 ENVIRONMENTAL ALLERGIES: ICD-10-CM

## 2025-07-11 DIAGNOSIS — L65.9 HAIR LOSS: ICD-10-CM

## 2025-07-11 DIAGNOSIS — Z00.00 MEDICARE ANNUAL WELLNESS VISIT, SUBSEQUENT: Primary | ICD-10-CM

## 2025-07-11 PROCEDURE — 99214 OFFICE O/P EST MOD 30 MIN: CPT | Performed by: FAMILY MEDICINE

## 2025-07-11 PROCEDURE — G2211 COMPLEX E/M VISIT ADD ON: HCPCS | Performed by: FAMILY MEDICINE

## 2025-07-11 PROCEDURE — G0439 PPPS, SUBSEQ VISIT: HCPCS | Performed by: FAMILY MEDICINE

## 2025-07-11 RX ORDER — SYRINGE WITH NEEDLE, 1 ML 25GX5/8"
SYRINGE, EMPTY DISPOSABLE MISCELLANEOUS
COMMUNITY
Start: 2025-05-21

## 2025-07-11 RX ORDER — LEVOCETIRIZINE DIHYDROCHLORIDE 5 MG/1
5 TABLET, FILM COATED ORAL EVERY EVENING
Qty: 90 TABLET | Refills: 1 | Status: SHIPPED | OUTPATIENT
Start: 2025-07-11

## 2025-07-11 NOTE — PROGRESS NOTES
Name: Gita Rendon      : 1985      MRN: [id number]  Encounter Provider: Norma Traore MD  Encounter Date: 2025   Encounter department: [facility]  :  Assessment & Plan  Medicare annual wellness visit, subsequent  - UTD with preventive screenings  Orders:    Lipid Panel with Direct LDL reflex; Future    Hemoglobin A1C; Future    Environmental allergies  - Patient wants Xyzal instead of zyrtec  Orders:    levocetirizine (XYZAL) 5 MG tablet; Take 1 tablet (5 mg total) by mouth every evening    Hair loss  - Patient requesting further eval due to concern of hair loss x 1.5 years  Orders:    Ambulatory Referral to Dermatology; Future      Depression Screening and Follow-up Plan: Patient's depression screening was positive with a PHQ-9 score of 11.   Continue regular follow-up with their mental health provider who is managing their mental health condition(s).       Preventive health issues were discussed with patient, and age appropriate screening tests were ordered as noted in patient's After Visit Summary. Personalized health advice and appropriate referrals for health education or preventive services given if needed, as noted in patient's After Visit Summary.    History of Present Illness     HPI   Patient Care Team:  Norma Traore MD as PCP - General (Family Medicine)  Corona Lopez DO as PCP - PCP-[facility] ([facility])  Norma Traore MD as PCP - PCP-[facility] ([facility])  YANG Irvin DO Kaveh Kousari, MD Gbolabo Sokunbi, MD Kathryn Nassar, PA-C (Physician Assistant)  LEIGHA Arthur as Nurse Practitioner (Nephrology)  Lianna Thompson DO (Nephrology)  Lianna Thompson DO (Nephrology)  Lianna Thompson DO (Nephrology)  LEIGHA Arthur as Nurse Practitioner (Nephrology)    Review of Systems   HENT:          Hair loss     Medical History Reviewed by provider this encounter:       Annual Wellness Visit Questionnaire    Gita is here for her Subsequent Wellness visit. Last Medicare Wellness visit information reviewed, patient interviewed and updates made to the record today.      Health Risk Assessment:   Patient rates overall health as fair. Patient feels that their physical health rating is same. Patient is dissatisfied with their life. Eyesight was rated as same. Hearing was rated as same. Patient feels that their emotional and mental health rating is slightly better. Patients states they are never, rarely angry. Patient states they are always unusually tired/fatigued. Pain experienced in the last 7 days has been a lot. Patient's pain rating has been 5/10. Patient states that she has experienced weight loss or gain in last 6 months.     Depression Screening:   PHQ-9 Score: 11      Fall Risk Screening:   In the past year, patient has experienced: history of falling in past year    Number of falls: 1  Injured during fall?: No    Feels unsteady when standing or walking?: No    Worried about falling?: No      Urinary Incontinence Screening:   Patient has leaked urine accidently in the last six months.     Home Safety:  Patient does not have trouble with stairs inside or outside of their home. Patient has working smoke alarms and has working carbon monoxide detector. Home safety hazards include: medications that cause fatigue.     Nutrition:   Current diet is Regular.     Medications:   Patient is currently taking over-the-counter supplements. OTC medications include: see medication list. Patient is able to manage medications.     Activities of Daily Living (ADLs)/Instrumental Activities of Daily Living (IADLs):   Walk and transfer into and out of bed and chair?: Yes  Dress and groom yourself?: Yes    Bathe or shower yourself?: Yes    Feed yourself? Yes  Do your laundry/housekeeping?: Yes  Manage your money, pay your bills and track your expenses?: Yes  Make your own meals?: Yes    Do your own shopping?: Yes    Previous  Hospitalizations:   Any hospitalizations or ED visits within the last 12 months?: Yes    How many hospitalizations have you had in the last year?: 1-2    Advance Care Planning:   Living will: No    Durable POA for healthcare: No    Advanced directive: No      Preventive Screenings      Cardiovascular Screening:    General: Screening Current      Diabetes Screening:     General: Screening Current      Colorectal Cancer Screening:     General: Screening Current      Breast Cancer Screening:     General: Screening Current      Cervical Cancer Screening:    General: Screening Current      Lung Cancer Screening:     General: Screening Not Indicated    Screening, Brief Intervention, and Referral to Treatment (SBIRT)     Screening  Typical number of drinks in a day: 0  Typical number of drinks in a week: 0  Interpretation: Low risk drinking behavior.    AUDIT-C Screenin) How often did you have a drink containing alcohol in the past year? never  2) How many drinks did you have on a typical day when you were drinking in the past year? 0  3) How often did you have 6 or more drinks on one occasion in the past year? never    AUDIT-C Score: 0  Interpretation: Score 0-2 (female): Negative screen for alcohol misuse    Single Item Drug Screening:  How often have you used an illegal drug (including marijuana) or a prescription medication for non-medical reasons in the past year? never    Single Item Drug Screen Score: 0  Interpretation: Negative screen for possible drug use disorder    Social Drivers of Health     Financial Resource Strain: Not At Risk (2025)    Received from [facility]    Financial Resource Strain     In the last 12 months did you skip medications to save money?: No     In the last 12 months, was there a time when you needed to see a doctor but could not because of cost?: No   Food Insecurity: Food Insecurity Present (7/10/2025)    Nursing - Inadequate Food Risk Classification     " Worried About Running Out of Food in the Last Year: Never true     Ran Out of Food in the Last Year: Never true     Ran Out of Food in the Last Year: Sometimes true   Transportation Needs: No Transportation Needs (7/10/2025)    PRAPARE - Transportation     Lack of Transportation (Medical): No     Lack of Transportation (Non-Medical): No   Housing Stability: Low Risk  (7/10/2025)    Housing Stability Vital Sign     Unable to Pay for Housing in the Last Year: No     Number of Times Moved in the Last Year: 0     Homeless in the Last Year: No   Utilities: Not At Risk (7/10/2025)    [facility] Utilities     Threatened with loss of utilities: No     No results found.    Objective   /62   Pulse 93   Temp 97.8 °F (36.6 °C) (Tympanic)   Ht 5' 7\" (1.702 m)   Wt (!) 154 kg (339 lb 12.8 oz)   LMP 06/22/2025   SpO2 97%   BMI 53.22 kg/m²     Physical Exam  Constitutional:       Appearance: She is well-developed.   HENT:      Head: Normocephalic and atraumatic.     Eyes:      Conjunctiva/sclera: Conjunctivae normal.       Cardiovascular:      Rate and Rhythm: Normal rate and regular rhythm.      Heart sounds: Normal heart sounds.   Pulmonary:      Effort: Pulmonary effort is normal.      Breath sounds: Normal breath sounds.   Abdominal:      General: Bowel sounds are normal.      Palpations: Abdomen is soft.     Musculoskeletal:      Cervical back: Normal range of motion and neck supple.     Skin:     General: Skin is warm and dry.     Neurological:      Mental Status: She is alert and oriented to person, place, and time.         "

## 2025-07-11 NOTE — PATIENT INSTRUCTIONS
Medicare Preventive Visit Patient Instructions  Thank you for completing your Welcome to Medicare Visit or Medicare Annual Wellness Visit today. Your next wellness visit will be due in one year (7/12/2026).  The screening/preventive services that you may require over the next 5-10 years are detailed below. Some tests may not apply to you based off risk factors and/or age. Screening tests ordered at today's visit but not completed yet may show as past due. Also, please note that scanned in results may not display below.  Preventive Screenings:  Service Recommendations Previous Testing/Comments   Colorectal Cancer Screening  * Colonoscopy    * Fecal Occult Blood Test (FOBT)/Fecal Immunochemical Test (FIT)  * Fecal DNA/Cologuard Test  * Flexible Sigmoidoscopy Age: 45-75 years old   Colonoscopy: every 10 years (may be performed more frequently if at higher risk)  OR  FOBT/FIT: every 1 year  OR  Cologuard: every 3 years  OR  Sigmoidoscopy: every 5 years  Screening may be recommended earlier than age 45 if at higher risk for colorectal cancer. Also, an individualized decision between you and your healthcare provider will decide whether screening between the ages of 76-85 would be appropriate. Colonoscopy: 10/15/2021  FOBT/FIT: Not on file  Cologuard: Not on file  Sigmoidoscopy: Not on file    Screening Current     Breast Cancer Screening Age: 40+ years old  Frequency: every 1-2 years  Not required if history of left and right mastectomy Mammogram: 05/29/2025    Screening Current   Cervical Cancer Screening Between the ages of 21-29, pap smear recommended once every 3 years.   Between the ages of 30-65, can perform pap smear with HPV co-testing every 5 years.   Recommendations may differ for women with a history of total hysterectomy, cervical cancer, or abnormal pap smears in past. Pap Smear: 04/07/2025    Screening Current   Hepatitis C Screening Once for adults born between 1945 and 1965  More frequently in patients at  high risk for Hepatitis C Hep C Antibody: Not on file        Diabetes Screening 1-2 times per year if you're at risk for diabetes or have pre-diabetes Fasting glucose: 95 mg/dL (4/22/2025)  A1C: 5.4 % (10/25/2024)  Screening Current   Cholesterol Screening Once every 5 years if you don't have a lipid disorder. May order more often based on risk factors. Lipid panel: 10/25/2024    Screening Current     Other Preventive Screenings Covered by Medicare:  Abdominal Aortic Aneurysm (AAA) Screening: covered once if your at risk. You're considered to be at risk if you have a family history of AAA.  Lung Cancer Screening: covers low dose CT scan once per year if you meet all of the following conditions: (1) Age 55-77; (2) No signs or symptoms of lung cancer; (3) Current smoker or have quit smoking within the last 15 years; (4) You have a tobacco smoking history of at least 20 pack years (packs per day multiplied by number of years you smoked); (5) You get a written order from a healthcare provider.  Glaucoma Screening: covered annually if you're considered high risk: (1) You have diabetes OR (2) Family history of glaucoma OR (3)  aged 50 and older OR (4)  American aged 65 and older  Osteoporosis Screening: covered every 2 years if you meet one of the following conditions: (1) You're estrogen deficient and at risk for osteoporosis based off medical history and other findings; (2) Have a vertebral abnormality; (3) On glucocorticoid therapy for more than 3 months; (4) Have primary hyperparathyroidism; (5) On osteoporosis medications and need to assess response to drug therapy.   Last bone density test (DXA Scan): Not on file.  HIV Screening: covered annually if you're between the age of 15-65. Also covered annually if you are younger than 15 and older than 65 with risk factors for HIV infection. For pregnant patients, it is covered up to 3 times per pregnancy.    Immunizations:  Immunization  Recommendations   Influenza Vaccine Annual influenza vaccination during flu season is recommended for all persons aged >= 6 months who do not have contraindications   Pneumococcal Vaccine   * Pneumococcal conjugate vaccine = PCV13 (Prevnar 13), PCV15 (Vaxneuvance), PCV20 (Prevnar 20)  * Pneumococcal polysaccharide vaccine = PPSV23 (Pneumovax) Adults 19-65 yo with certain risk factors or if 65+ yo  If never received any pneumonia vaccine: recommend Prevnar 20 (PCV20)  Give PCV20 if previously received 1 dose of PCV13 or PPSV23   Hepatitis B Vaccine 3 dose series if at intermediate or high risk (ex: diabetes, end stage renal disease, liver disease)   Respiratory syncytial virus (RSV) Vaccine - COVERED BY MEDICARE PART D  * RSVPreF3 (Arexvy) CDC recommends that adults 60 years of age and older may receive a single dose of RSV vaccine using shared clinical decision-making (SCDM)   Tetanus (Td) Vaccine - COST NOT COVERED BY MEDICARE PART B Following completion of primary series, a booster dose should be given every 10 years to maintain immunity against tetanus. Td may also be given as tetanus wound prophylaxis.   Tdap Vaccine - COST NOT COVERED BY MEDICARE PART B Recommended at least once for all adults. For pregnant patients, recommended with each pregnancy.   Shingles Vaccine (Shingrix) - COST NOT COVERED BY MEDICARE PART B  2 shot series recommended in those 19 years and older who have or will have weakened immune systems or those 50 years and older     Health Maintenance Due:      Topic Date Due   • Hepatitis C Screening  Never done   • HIV Screening  Never done   • Breast Cancer Screening: Mammogram  05/29/2026   • Cervical Cancer Screening  05/11/2027     Immunizations Due:      Topic Date Due   • HPV Vaccine (1 - 3-dose series) Never done   • Influenza Vaccine (1) 09/01/2025     Advance Directives   What are advance directives?  Advance directives are legal documents that state your wishes and plans for medical  care. These plans are made ahead of time in case you lose your ability to make decisions for yourself. Advance directives can apply to any medical decision, such as the treatments you want, and if you want to donate organs.   What are the types of advance directives?  There are many types of advance directives, and each state has rules about how to use them. You may choose a combination of any of the following:  Living will:  This is a written record of the treatment you want. You can also choose which treatments you do not want, which to limit, and which to stop at a certain time. This includes surgery, medicine, IV fluid, and tube feedings.   Durable power of  for healthcare (DPAHC):  This is a written record that states who you want to make healthcare choices for you when you are unable to make them for yourself. This person, called a proxy, is usually a family member or a friend. You may choose more than 1 proxy.  Do not resuscitate (DNR) order:  A DNR order is used in case your heart stops beating or you stop breathing. It is a request not to have certain forms of treatment, such as CPR. A DNR order may be included in other types of advance directives.  Medical directive:  This covers the care that you want if you are in a coma, near death, or unable to make decisions for yourself. You can list the treatments you want for each condition. Treatment may include pain medicine, surgery, blood transfusions, dialysis, IV or tube feedings, and a ventilator (breathing machine).  Values history:  This document has questions about your views, beliefs, and how you feel and think about life. This information can help others choose the care that you would choose.  Why are advance directives important?  An advance directive helps you control your care. Although spoken wishes may be used, it is better to have your wishes written down. Spoken wishes can be misunderstood, or not followed. Treatments may be given even if  you do not want them. An advance directive may make it easier for your family to make difficult choices about your care.   Urinary Incontinence   Urinary incontinence (UI)  is when you lose control of your bladder. UI develops because your bladder cannot store or empty urine properly. The 3 most common types of UI are stress incontinence, urge incontinence, or both.  Medicines:   May be given to help strengthen your bladder control. Report any side effects of medication to your healthcare provider.  Do pelvic muscle exercises often:  Your pelvic muscles help you stop urinating. Squeeze these muscles tight for 5 seconds, then relax for 5 seconds. Gradually work up to squeezing for 10 seconds. Do 3 sets of 15 repetitions a day, or as directed. This will help strengthen your pelvic muscles and improve bladder control.  Train your bladder:  Go to the bathroom at set times, such as every 2 hours, even if you do not feel the urge to go. You can also try to hold your urine when you feel the urge to go. For example, hold your urine for 5 minutes when you feel the urge to go. As that becomes easier, hold your urine for 10 minutes.   Self-care:   Keep a UI record.  Write down how often you leak urine and how much you leak. Make a note of what you were doing when you leaked urine.  Drink liquids as directed. You may need to limit the amount of liquid you drink to help control your urine leakage. Do not drink any liquid right before you go to bed. Limit or do not have drinks that contain caffeine or alcohol.   Prevent constipation.  Eat a variety of high-fiber foods. Good examples are high-fiber cereals, beans, vegetables, and whole-grain breads. Walking is the best way to trigger your intestines to have a bowel movement.  Exercise regularly and maintain a healthy weight.  Weight loss and exercise will decrease pressure on your bladder and help you control your leakage.   Use a catheter as directed  to help empty your bladder.  A catheter is a tiny, plastic tube that is put into your bladder to drain your urine.   Go to behavior therapy as directed.  Behavior therapy may be used to help you learn to control your urge to urinate.    Weight Management   Why it is important to manage your weight:  Being overweight increases your risk of health conditions such as heart disease, high blood pressure, type 2 diabetes, and certain types of cancer. It can also increase your risk for osteoarthritis, sleep apnea, and other respiratory problems. Aim for a slow, steady weight loss. Even a small amount of weight loss can lower your risk of health problems.  How to lose weight safely:  A safe and healthy way to lose weight is to eat fewer calories and get regular exercise. You can lose up about 1 pound a week by decreasing the number of calories you eat by 500 calories each day.   Healthy meal plan for weight management:  A healthy meal plan includes a variety of foods, contains fewer calories, and helps you stay healthy. A healthy meal plan includes the following:  Eat whole-grain foods more often.  A healthy meal plan should contain fiber. Fiber is the part of grains, fruits, and vegetables that is not broken down by your body. Whole-grain foods are healthy and provide extra fiber in your diet. Some examples of whole-grain foods are whole-wheat breads and pastas, oatmeal, brown rice, and bulgur.  Eat a variety of vegetables every day.  Include dark, leafy greens such as spinach, kale, jaquan greens, and mustard greens. Eat yellow and orange vegetables such as carrots, sweet potatoes, and winter squash.   Eat a variety of fruits every day.  Choose fresh or canned fruit (canned in its own juice or light syrup) instead of juice. Fruit juice has very little or no fiber.  Eat low-fat dairy foods.  Drink fat-free (skim) milk or 1% milk. Eat fat-free yogurt and low-fat cottage cheese. Try low-fat cheeses such as mozzarella and other reduced-fat  cheeses.  Choose meat and other protein foods that are low in fat.  Choose beans or other legumes such as split peas or lentils. Choose fish, skinless poultry (chicken or turkey), or lean cuts of red meat (beef or pork). Before you cook meat or poultry, cut off any visible fat.   Use less fat and oil.  Try baking foods instead of frying them. Add less fat, such as margarine, sour cream, regular salad dressing and mayonnaise to foods. Eat fewer high-fat foods. Some examples of high-fat foods include french fries, doughnuts, ice cream, and cakes.  Eat fewer sweets.  Limit foods and drinks that are high in sugar. This includes candy, cookies, regular soda, and sweetened drinks.  Exercise:  Exercise at least 30 minutes per day on most days of the week. Some examples of exercise include walking, biking, dancing, and swimming. You can also fit in more physical activity by taking the stairs instead of the elevator or parking farther away from stores. Ask your healthcare provider about the best exercise plan for you.    © Copyright [employer] 2018 Information is for End User's use only and may not be sold, redistributed or otherwise used for commercial purposes. All illustrations and images included in CareNotes® are the copyrighted property of [employer] Inc[employer] or [employer]

## 2025-07-13 ENCOUNTER — NURSE TRIAGE (OUTPATIENT)
Dept: OTHER | Facility: OTHER | Age: 40
End: 2025-07-13

## 2025-07-14 DIAGNOSIS — E66.813 CLASS 3 OBESITY: ICD-10-CM

## 2025-07-14 NOTE — TELEPHONE ENCOUNTER
"REASON FOR CONVERSATION: Restless Leg    SYMPTOMS: restless legs, so restless that it is causing pain. Pain 7/10. Only happening at night. Trouble sleeping due to the restlessness    OTHER HEALTH INFORMATION: Patients Neurologist decreased her dosage of Gabapentin 2 weeks ago from 300 mg in the morning, 900 mg at night to 600 mg at night. Patient took a Cogentin last night which seemed to help a little.     PROTOCOL DISPOSITION: See PCP Within 3 Days    CARE ADVICE PROVIDED: Tylenol/Motrin, heat, follow up with Neurologist as well. Cogentin as prescribed for restlessness    PRACTICE FOLLOW-UP: yes, please follow up with patient      Reason for Disposition   [1] MODERATE pain (e.g., interferes with normal activities, limping) AND [2] present > 3 days    Answer Assessment - Initial Assessment Questions  1. ONSET: \"When did the pain start?\"       3 days, worse at night    2. LOCATION: \"Where is the pain located?\"       Both legs    3. PAIN: \"How bad is the pain?\"    (Scale 1-10; or mild, moderate, severe)      7/10    4. WORK OR EXERCISE: \"Has there been any recent work or exercise that involved this part of the body?\"       More active than usual    5. CAUSE: \"What do you think is causing the leg pain?\"      Restless leg syndrome    6. OTHER SYMPTOMS: \"Do you have any other symptoms?\" (e.g., chest pain, back pain, breathing difficulty, swelling, rash, fever, numbness, weakness)  denies          Gabapentin 600 mg at 2100    Protocols used: Leg Pain-Adult-    "

## 2025-07-15 ENCOUNTER — HOSPITAL ENCOUNTER (OUTPATIENT)
Dept: MRI IMAGING | Facility: HOSPITAL | Age: 40
Discharge: HOME | End: 2025-07-15
Attending: PSYCHIATRY & NEUROLOGY
Payer: COMMERCIAL

## 2025-07-15 DIAGNOSIS — E66.813 CLASS 3 OBESITY: ICD-10-CM

## 2025-07-15 DIAGNOSIS — R32 URINARY INCONTINENCE: ICD-10-CM

## 2025-07-15 PROCEDURE — 70553 MRI BRAIN STEM W/O & W/DYE: CPT

## 2025-07-15 PROCEDURE — 72156 MRI NECK SPINE W/O & W/DYE: CPT

## 2025-07-15 PROCEDURE — A9585 GADOBUTROL INJECTION: HCPCS | Performed by: PSYCHIATRY & NEUROLOGY

## 2025-07-15 RX ORDER — TIRZEPATIDE 12.5 MG/.5ML
12.5 INJECTION, SOLUTION SUBCUTANEOUS WEEKLY
Qty: 2 ML | Refills: 0 | OUTPATIENT
Start: 2025-07-15 | End: 2025-08-12

## 2025-07-15 RX ORDER — TIRZEPATIDE 12.5 MG/.5ML
12.5 INJECTION, SOLUTION SUBCUTANEOUS WEEKLY
Qty: 2 ML | Refills: 0 | Status: SHIPPED | OUTPATIENT
Start: 2025-07-15 | End: 2025-08-12

## 2025-07-15 RX ORDER — GADOBUTROL 604.72 MG/ML
15 INJECTION INTRAVENOUS
Status: COMPLETED | OUTPATIENT
Start: 2025-07-15 | End: 2025-07-15

## 2025-07-15 RX ORDER — TIRZEPATIDE 12.5 MG/.5ML
INJECTION, SOLUTION SUBCUTANEOUS
Qty: 4 ML | Refills: 0 | OUTPATIENT
Start: 2025-07-15

## 2025-07-15 RX ADMIN — GADOBUTROL 15 ML: 604.72 INJECTION INTRAVENOUS at 15:58

## 2025-07-16 NOTE — TELEPHONE ENCOUNTER
Called patient to relay the message from the Provider. There was no identifier on the voicemail. Left message stating I was calling from the doctor's office and to call our number (provided) and someone can read the message from the Provider.

## 2025-07-17 ENCOUNTER — VBI (OUTPATIENT)
Dept: ADMINISTRATIVE | Facility: OTHER | Age: 40
End: 2025-07-17

## 2025-07-17 ENCOUNTER — NURSE TRIAGE (OUTPATIENT)
Dept: OTHER | Facility: OTHER | Age: 40
End: 2025-07-17

## 2025-07-17 ENCOUNTER — TELEPHONE (OUTPATIENT)
Age: 40
End: 2025-07-17

## 2025-07-17 DIAGNOSIS — M50.30 DDD (DEGENERATIVE DISC DISEASE), CERVICAL: Primary | ICD-10-CM

## 2025-07-17 NOTE — TELEPHONE ENCOUNTER
Warm transfer from [place]. Patient asked to review the results of the four MRIs ordered by Dr. Villa.    MRI cervical spine w wo contrast  MRI brain MS w wo contrast  MRI thoracic spine w wo contrast  MRI lumber spine w wo contrast    Reviewed some general results (as patient was very concerned,) and advised I will send Dr. Villa a message for his review and recommendations. Patient voiced clear understanding.        Dr. Villa,  Would you kindly review patient's MRIs and provide your interpretations and recommendations.   Thank you !!!!

## 2025-07-17 NOTE — TELEPHONE ENCOUNTER
07/17/25 11:05 AM     Chart reviewed for Diabetic Eye Exam was/were not submitted to the patient's insurance.     Keren Kelley MA   [facility]

## 2025-07-17 NOTE — TELEPHONE ENCOUNTER
MRI abdomen with showing findings consistent with IIH for which she is already using Diamox.     MRI of the cervical thoracic and lumbar region showing age-related changes for which I would recommend that she should do physical therapy and also see pain and spine specialist.  I will place a referral for both of them.     No findings concerning for MS at this time. No need for follow up.     1. DDD (degenerative disc disease), cervical (Primary)    - Ambulatory referral to Physical Therapy; Future  - Ambulatory referral to Spine & Pain Management; Future        Summerscristian Villa MD.   Staff Neurologist  07/17/25   11:02 AM

## 2025-07-18 NOTE — TELEPHONE ENCOUNTER
Attempted to reach patient with the on call providers response to he question regarding her medication. Two attempts made with no response. Left message to please call the service back so that we can discuss the on calls response.

## 2025-07-18 NOTE — TELEPHONE ENCOUNTER
Patient called back due to missed call from previous triage RN. Reviewed message that RN left in chart from epic secure chat message from on call provider. Patient verbalized understanding and has no other questions.

## 2025-07-18 NOTE — TELEPHONE ENCOUNTER
Reason for Disposition  • Caller has already spoken with another triager and has no further questions.    Protocols used: No Contact or Duplicate Contact Call-Adult-

## 2025-07-18 NOTE — TELEPHONE ENCOUNTER
"Regarding: no urge to urinate / dry eyes / itchy skin  ----- Message from Hedy RUIZ sent at 7/17/2025  7:50 PM EDT -----  \"Last Friday and this Monday I have taken my PRN dose of lasix, after that I have noticed I have little to no urge to urinate. That is not normal for me and I also had a change in medication with my Psych doctor so I'm not sure if that might be it as well. I'm also having very dry eyes and itch skin\"    "

## 2025-07-18 NOTE — TELEPHONE ENCOUNTER
"Regarding: Medication management Missed call  ----- Message from Susan VILLALTA sent at 7/17/2025  9:50 PM EDT -----  \" I called in earlier but I missed call back because I feel asleep . I would like a call back to go over recommendation\"    "

## 2025-07-21 ENCOUNTER — APPOINTMENT (OUTPATIENT)
Dept: LAB | Facility: CLINIC | Age: 40
End: 2025-07-21
Attending: PHYSICIAN ASSISTANT
Payer: COMMERCIAL

## 2025-07-21 ENCOUNTER — OFFICE VISIT (OUTPATIENT)
Dept: FAMILY MEDICINE CLINIC | Facility: CLINIC | Age: 40
End: 2025-07-21
Payer: COMMERCIAL

## 2025-07-21 VITALS
OXYGEN SATURATION: 100 % | HEIGHT: 67 IN | BODY MASS INDEX: 45.99 KG/M2 | HEART RATE: 67 BPM | WEIGHT: 293 LBS | SYSTOLIC BLOOD PRESSURE: 110 MMHG | DIASTOLIC BLOOD PRESSURE: 80 MMHG | TEMPERATURE: 97.6 F

## 2025-07-21 DIAGNOSIS — G43.719 INTRACTABLE CHRONIC MIGRAINE WITHOUT AURA AND WITHOUT STATUS MIGRAINOSUS: ICD-10-CM

## 2025-07-21 DIAGNOSIS — H04.123 DRY EYES, BILATERAL: ICD-10-CM

## 2025-07-21 DIAGNOSIS — Z91.09 ENVIRONMENTAL ALLERGIES: ICD-10-CM

## 2025-07-21 DIAGNOSIS — K11.7 XEROSTOMIA: Primary | ICD-10-CM

## 2025-07-21 DIAGNOSIS — K11.7 XEROSTOMIA: ICD-10-CM

## 2025-07-21 PROCEDURE — 36415 COLL VENOUS BLD VENIPUNCTURE: CPT

## 2025-07-21 PROCEDURE — 80299 QUANTITATIVE ASSAY DRUG: CPT

## 2025-07-21 PROCEDURE — 86225 DNA ANTIBODY NATIVE: CPT

## 2025-07-21 PROCEDURE — 86235 NUCLEAR ANTIGEN ANTIBODY: CPT

## 2025-07-21 PROCEDURE — 99214 OFFICE O/P EST MOD 30 MIN: CPT | Performed by: PHYSICIAN ASSISTANT

## 2025-07-21 PROCEDURE — G2211 COMPLEX E/M VISIT ADD ON: HCPCS | Performed by: PHYSICIAN ASSISTANT

## 2025-07-21 PROCEDURE — 86038 ANTINUCLEAR ANTIBODIES: CPT

## 2025-07-21 RX ORDER — LORATADINE 10 MG/1
10 TABLET ORAL DAILY
Qty: 30 TABLET | Refills: 0 | Status: SHIPPED | OUTPATIENT
Start: 2025-07-21

## 2025-07-21 RX ORDER — GABAPENTIN 300 MG/1
300 CAPSULE ORAL 3 TIMES DAILY
COMMUNITY
Start: 2025-07-15

## 2025-07-21 RX ORDER — POLYVINYL ALCOHOL 14 MG/ML
1 SOLUTION/ DROPS OPHTHALMIC AS NEEDED
Qty: 15 ML | Refills: 1 | Status: SHIPPED | OUTPATIENT
Start: 2025-07-21

## 2025-07-21 NOTE — ASSESSMENT & PLAN NOTE
- Pt reports new onset worsening of dry mouth, dry eyes ongoing for about the last two weeks   - Patient reports that she always had these symptoms somewhat at baseline however these have become significantly worsened over this timeline    - Of note, patient has recently initiated rexulti 0.5mg after discontinuing vraylar and notes that these new symptoms coincide with initiation of this medication    - Med list reviewed and patient has been taking zyrtec which can cause these symptoms, will transition patient to claritin which is less likely to cause    - Will also screen patient for Sjogren's    - Artificial tears provided for symptomatic improvement at this time    - Patient advised that symptoms not known adverse reactions of rexulti but did advise that she inform psychiatrist to see if they would like to  in light of new onset concerns.   Orders:    JANE Screen w/Reflex Cascade; Future    Sjogren's Antibodies; Future

## 2025-07-21 NOTE — PROGRESS NOTES
Name: Gita Rendon      : 1985      MRN: [id number]  Encounter Provider: Luis Rosenberg PA-C  Encounter Date: 2025   Encounter department: [facility]  :  Assessment & Plan  Xerostomia     - Pt reports new onset worsening of dry mouth, dry eyes ongoing for about the last two weeks   - Patient reports that she always had these symptoms somewhat at baseline however these have become significantly worsened over this timeline    - Of note, patient has recently initiated rexulti 0.5mg after discontinuing vraylar and notes that these new symptoms coincide with initiation of this medication    - Med list reviewed and patient has been taking zyrtec which can cause these symptoms, will transition patient to claritin which is less likely to cause    - Will also screen patient for Sjogren's    - Artificial tears provided for symptomatic improvement at this time    - Patient advised that symptoms not known adverse reactions of rexulti but did advise that she inform psychiatrist to see if they would like to  in light of new onset concerns.   Orders:    JANE Screen w/Reflex Cascade; Future    Sjogren's Antibodies; Future    Dry eyes, bilateral    Orders:    JANE Screen w/Reflex Cascade; Future    Sjogren's Antibodies; Future    polyvinyl alcohol (LIQUIFILM TEARS) 1.4 % ophthalmic solution; Administer 1 drop to both eyes as needed for dry eyes    Environmental allergies    Orders:    loratadine (CLARITIN) 10 mg tablet; Take 1 tablet (10 mg total) by mouth daily           History of Present Illness   Gita Rendon is a 39 y.o. female PMH MDD, PTSD, chronic migraine presenting with new onset worsening of dry membranes, dry eye bilaterally ongoing for two weeks. Patient states that these are issues that she deals with at baseline but that they have acutely worsened over the past two weeks. New medication rexulti 0.5mg added at about the same time frame as worsening of  "these symptoms.      Review of Systems   Constitutional:  Negative for fatigue and fever.   Respiratory:  Negative for cough and shortness of breath.    Cardiovascular:  Negative for chest pain.   Gastrointestinal:  Negative for diarrhea, nausea and vomiting.   Neurological:  Negative for headaches.       Objective   /80 (BP Location: Left arm, Patient Position: Sitting)   Pulse 67   Temp 97.6 °F (36.4 °C) (Tympanic)   Ht 5' 7\" (1.702 m)   Wt (!) 154 kg (339 lb 12.8 oz)   LMP 06/22/2025   SpO2 100%   BMI 53.22 kg/m²      Physical Exam  Constitutional:       Appearance: Normal appearance.   HENT:      Head: Normocephalic.      Comments: Dry eyes bilaterally      Right Ear: External ear normal.      Left Ear: External ear normal.      Nose: Nose normal.      Mouth/Throat:      Mouth: Mucous membranes are dry.      Pharynx: Oropharynx is clear.     Eyes:      Conjunctiva/sclera: Conjunctivae normal.       Cardiovascular:      Rate and Rhythm: Normal rate and regular rhythm.      Heart sounds: Normal heart sounds.   Pulmonary:      Effort: Pulmonary effort is normal.      Breath sounds: Normal breath sounds.   Abdominal:      General: Bowel sounds are normal.      Palpations: Abdomen is soft.     Neurological:      Mental Status: She is alert and oriented to person, place, and time.     Psychiatric:         Behavior: Behavior normal.         "

## 2025-07-23 ENCOUNTER — TELEPHONE (OUTPATIENT)
Age: 40
End: 2025-07-23

## 2025-07-23 NOTE — TELEPHONE ENCOUNTER
Pt under care of:  Shekhar  Office Location: [place]    Insurance   Current Insurance?Asha Fu. [facility]   Insurance E-verified? Y    Pt calling due to: Reschedule previously canceled appt. Pt canceled previous 7/22 appt due to being sick.     Appointment Details   Date:  9/9    Time:  1:30 PM    Location:  [place]    Provider:  Shekhar    Does the appointment need further review? N      Pt can be reached at: [phone]

## 2025-07-25 LAB — MISCELLANEOUS LAB TEST RESULT: NORMAL

## 2025-07-26 LAB
DSDNA IGG SERPL IA-ACNC: <0.9 IU/ML (ref ?–15)
ENA SS-A AB SER IA-ACNC: <0.5 U/ML (ref ?–10)
ENA SS-B IGG SER IA-ACNC: <0.6 U/ML (ref ?–10)
NUCLEAR IGG SER IA-RTO: 0.3 RATIO (ref ?–1)

## 2025-07-27 ENCOUNTER — APPOINTMENT (EMERGENCY)
Dept: CT IMAGING | Facility: HOSPITAL | Age: 40
End: 2025-07-27
Payer: COMMERCIAL

## 2025-07-27 ENCOUNTER — HOSPITAL ENCOUNTER (EMERGENCY)
Facility: HOSPITAL | Age: 40
Discharge: HOME | End: 2025-07-28
Admitting: EMERGENCY MEDICINE
Payer: COMMERCIAL

## 2025-07-27 DIAGNOSIS — K21.9 GASTROESOPHAGEAL REFLUX DISEASE WITHOUT ESOPHAGITIS: ICD-10-CM

## 2025-07-27 DIAGNOSIS — R11.0 NAUSEA: ICD-10-CM

## 2025-07-27 DIAGNOSIS — R10.9 ABDOMINAL PAIN: Primary | ICD-10-CM

## 2025-07-27 LAB
ALBUMIN SERPL BCG-MCNC: 4 G/DL (ref 3.5–5)
ALP SERPL-CCNC: 70 U/L (ref 34–104)
ALT SERPL W P-5'-P-CCNC: 13 U/L (ref 7–52)
ANION GAP SERPL CALCULATED.3IONS-SCNC: 6 MMOL/L (ref 4–13)
AST SERPL W P-5'-P-CCNC: 11 U/L (ref 13–39)
BACTERIA UR QL AUTO: ABNORMAL /HPF
BASOPHILS # BLD AUTO: 0.05 THOUSANDS/ÂΜL (ref 0–0.1)
BASOPHILS NFR BLD AUTO: 1 % (ref 0–1)
BILIRUB SERPL-MCNC: 0.33 MG/DL (ref 0.2–1)
BILIRUB UR QL STRIP: NEGATIVE
BUN SERPL-MCNC: 21 MG/DL (ref 5–25)
CALCIUM SERPL-MCNC: 8.7 MG/DL (ref 8.4–10.2)
CARDIAC TROPONIN I PNL SERPL HS: 4 NG/L (ref 8–18)
CHLORIDE SERPL-SCNC: 114 MMOL/L (ref 96–108)
CLARITY UR: CLEAR
CO2 SERPL-SCNC: 21 MMOL/L (ref 21–32)
COLOR UR: YELLOW
CREAT SERPL-MCNC: 0.88 MG/DL (ref 0.6–1.3)
EOSINOPHIL # BLD AUTO: 0.3 THOUSAND/ÂΜL (ref 0–0.61)
EOSINOPHIL NFR BLD AUTO: 3 % (ref 0–6)
ERYTHROCYTE [DISTWIDTH] IN BLOOD BY AUTOMATED COUNT: 13.7 % (ref 11.6–15.1)
EXT PREGNANCY TEST URINE: NEGATIVE
EXT. CONTROL: NORMAL
GFR SERPL CREATININE-BSD FRML MDRD: 82 ML/MIN/1.73SQ M
GLUCOSE SERPL-MCNC: 89 MG/DL (ref 65–140)
GLUCOSE UR STRIP-MCNC: NEGATIVE MG/DL
HCT VFR BLD AUTO: 44.7 % (ref 34.8–46.1)
HGB BLD-MCNC: 14.1 G/DL (ref 11.5–15.4)
HGB UR QL STRIP.AUTO: ABNORMAL
HYALINE CASTS #/AREA URNS LPF: ABNORMAL /LPF
IMM GRANULOCYTES # BLD AUTO: 0.03 THOUSAND/UL (ref 0–0.2)
IMM GRANULOCYTES NFR BLD AUTO: 0 % (ref 0–2)
KETONES UR STRIP-MCNC: NEGATIVE MG/DL
LACTATE SERPL-SCNC: 0.6 MMOL/L (ref 0.5–2)
LEUKOCYTE ESTERASE UR QL STRIP: ABNORMAL
LIPASE SERPL-CCNC: 11 U/L (ref 11–82)
LYMPHOCYTES # BLD AUTO: 2.18 THOUSANDS/ÂΜL (ref 0.6–4.47)
LYMPHOCYTES NFR BLD AUTO: 22 % (ref 14–44)
MCH RBC QN AUTO: 28.1 PG (ref 26.8–34.3)
MCHC RBC AUTO-ENTMCNC: 31.5 G/DL (ref 31.4–37.4)
MCV RBC AUTO: 89 FL (ref 82–98)
MONOCYTES # BLD AUTO: 0.57 THOUSAND/ÂΜL (ref 0.17–1.22)
MONOCYTES NFR BLD AUTO: 6 % (ref 4–12)
MUCOUS THREADS UR QL AUTO: ABNORMAL
NEUTROPHILS # BLD AUTO: 6.9 THOUSANDS/ÂΜL (ref 1.85–7.62)
NEUTS SEG NFR BLD AUTO: 68 % (ref 43–75)
NITRITE UR QL STRIP: NEGATIVE
NON-SQ EPI CELLS URNS QL MICRO: ABNORMAL /HPF
NRBC BLD AUTO-RTO: 0 /100 WBCS
PH UR STRIP.AUTO: 5.5 [PH]
PLATELET # BLD AUTO: 278 THOUSANDS/UL (ref 149–390)
PMV BLD AUTO: 10.2 FL (ref 8.9–12.7)
POTASSIUM SERPL-SCNC: 3.6 MMOL/L (ref 3.5–5.3)
PROT SERPL-MCNC: 6.6 G/DL (ref 6.4–8.4)
PROT UR STRIP-MCNC: ABNORMAL MG/DL
RBC # BLD AUTO: 5.01 MILLION/UL (ref 3.81–5.12)
RBC #/AREA URNS AUTO: ABNORMAL /HPF
SODIUM SERPL-SCNC: 141 MMOL/L (ref 135–147)
SP GR UR STRIP.AUTO: >=1.03 (ref 1–1.03)
UROBILINOGEN UR STRIP-ACNC: <2 MG/DL
WBC # BLD AUTO: 10.03 THOUSAND/UL (ref 4.31–10.16)
WBC #/AREA URNS AUTO: ABNORMAL /HPF

## 2025-07-27 PROCEDURE — 83605 ASSAY OF LACTIC ACID: CPT | Performed by: PHYSICIAN ASSISTANT

## 2025-07-27 PROCEDURE — 81001 URINALYSIS AUTO W/SCOPE: CPT | Performed by: PHYSICIAN ASSISTANT

## 2025-07-27 PROCEDURE — 99284 EMERGENCY DEPT VISIT MOD MDM: CPT

## 2025-07-27 PROCEDURE — 36415 COLL VENOUS BLD VENIPUNCTURE: CPT | Performed by: PHYSICIAN ASSISTANT

## 2025-07-27 PROCEDURE — 96361 HYDRATE IV INFUSION ADD-ON: CPT

## 2025-07-27 PROCEDURE — 99285 EMERGENCY DEPT VISIT HI MDM: CPT | Performed by: PHYSICIAN ASSISTANT

## 2025-07-27 PROCEDURE — 80053 COMPREHEN METABOLIC PANEL: CPT | Performed by: PHYSICIAN ASSISTANT

## 2025-07-27 PROCEDURE — 93005 ELECTROCARDIOGRAM TRACING: CPT

## 2025-07-27 PROCEDURE — 83690 ASSAY OF LIPASE: CPT | Performed by: PHYSICIAN ASSISTANT

## 2025-07-27 PROCEDURE — 84484 ASSAY OF TROPONIN QUANT: CPT | Performed by: PHYSICIAN ASSISTANT

## 2025-07-27 PROCEDURE — 85025 COMPLETE CBC W/AUTO DIFF WBC: CPT | Performed by: PHYSICIAN ASSISTANT

## 2025-07-27 PROCEDURE — 74177 CT ABD & PELVIS W/CONTRAST: CPT

## 2025-07-27 PROCEDURE — 81025 URINE PREGNANCY TEST: CPT | Performed by: PHYSICIAN ASSISTANT

## 2025-07-27 PROCEDURE — 96374 THER/PROPH/DIAG INJ IV PUSH: CPT

## 2025-07-27 RX ORDER — ONDANSETRON 2 MG/ML
4 INJECTION INTRAMUSCULAR; INTRAVENOUS ONCE
Status: COMPLETED | OUTPATIENT
Start: 2025-07-27 | End: 2025-07-27

## 2025-07-27 RX ADMIN — IOHEXOL 100 ML: 350 INJECTION, SOLUTION INTRAVENOUS at 21:31

## 2025-07-27 RX ADMIN — SODIUM CHLORIDE 1000 ML: 0.9 INJECTION, SOLUTION INTRAVENOUS at 19:28

## 2025-07-27 RX ADMIN — ONDANSETRON 4 MG: 2 INJECTION INTRAMUSCULAR; INTRAVENOUS at 19:31

## 2025-07-28 ENCOUNTER — TELEPHONE (OUTPATIENT)
Age: 40
End: 2025-07-28

## 2025-07-28 VITALS
OXYGEN SATURATION: 98 % | DIASTOLIC BLOOD PRESSURE: 87 MMHG | TEMPERATURE: 98.2 F | HEART RATE: 57 BPM | SYSTOLIC BLOOD PRESSURE: 142 MMHG | RESPIRATION RATE: 20 BRPM

## 2025-07-28 DIAGNOSIS — K59.09 CHRONIC CONSTIPATION: Primary | ICD-10-CM

## 2025-07-28 LAB
ATRIAL RATE: 56 BPM
P AXIS: 31 DEGREES
PR INTERVAL: 184 MS
QRS AXIS: 6 DEGREES
QRSD INTERVAL: 98 MS
QT INTERVAL: 400 MS
QTC INTERVAL: 386 MS
T WAVE AXIS: 41 DEGREES
VENTRICULAR RATE: 56 BPM

## 2025-07-28 PROCEDURE — 93010 ELECTROCARDIOGRAM REPORT: CPT | Performed by: INTERNAL MEDICINE

## 2025-07-28 RX ORDER — ONDANSETRON 4 MG/1
4 TABLET, ORALLY DISINTEGRATING ORAL EVERY 6 HOURS PRN
Qty: 20 TABLET | Refills: 0 | Status: SHIPPED | OUTPATIENT
Start: 2025-07-28

## 2025-07-28 RX ORDER — SUCRALFATE 1 G/1
1 TABLET ORAL 3 TIMES DAILY PRN
Qty: 90 TABLET | Refills: 0 | Status: SHIPPED | OUTPATIENT
Start: 2025-07-28 | End: 2025-08-27

## 2025-07-29 RX ORDER — OMEPRAZOLE 20 MG/1
20 CAPSULE, DELAYED RELEASE ORAL 2 TIMES DAILY
Qty: 180 CAPSULE | Refills: 1 | Status: SHIPPED | OUTPATIENT
Start: 2025-07-29

## 2025-07-30 ENCOUNTER — PREP FOR PROCEDURE (OUTPATIENT)
Dept: GASTROENTEROLOGY | Facility: CLINIC | Age: 40
End: 2025-07-30

## 2025-07-30 ENCOUNTER — APPOINTMENT (OUTPATIENT)
Dept: RADIOLOGY | Facility: CLINIC | Age: 40
End: 2025-07-30
Attending: PHYSICIAN ASSISTANT
Payer: COMMERCIAL

## 2025-07-30 DIAGNOSIS — R11.0 INTRACTABLE NAUSEA: ICD-10-CM

## 2025-07-30 DIAGNOSIS — K59.09 CHRONIC CONSTIPATION: ICD-10-CM

## 2025-07-30 DIAGNOSIS — R10.84 GENERALIZED ABDOMINAL PAIN: Primary | ICD-10-CM

## 2025-07-30 PROCEDURE — 74018 RADEX ABDOMEN 1 VIEW: CPT

## 2025-07-30 RX ORDER — SODIUM CHLORIDE, SODIUM LACTATE, POTASSIUM CHLORIDE, CALCIUM CHLORIDE 600; 310; 30; 20 MG/100ML; MG/100ML; MG/100ML; MG/100ML
125 INJECTION, SOLUTION INTRAVENOUS CONTINUOUS
OUTPATIENT
Start: 2025-07-30

## 2025-07-30 NOTE — TELEPHONE ENCOUNTER
Called and spoke with pt. Pt aware to reach out to the office with any questions.     Pt scheduled following appts as recommended;  8/3/25 PT  9/3/25 Pain Management

## 2025-08-01 ENCOUNTER — RESULTS FOLLOW-UP (OUTPATIENT)
Dept: GASTROENTEROLOGY | Facility: CLINIC | Age: 40
End: 2025-08-01

## 2025-08-01 DIAGNOSIS — K59.09 CHRONIC CONSTIPATION: Primary | ICD-10-CM

## 2025-08-01 RX ORDER — PRUCALOPRIDE 2 MG/1
2 TABLET, FILM COATED ORAL DAILY
Qty: 30 TABLET | Refills: 5 | Status: SHIPPED | OUTPATIENT
Start: 2025-08-01

## 2025-08-04 ENCOUNTER — EVALUATION (OUTPATIENT)
Dept: PHYSICAL THERAPY | Facility: CLINIC | Age: 40
End: 2025-08-04
Payer: COMMERCIAL

## 2025-08-04 DIAGNOSIS — M50.30 DDD (DEGENERATIVE DISC DISEASE), CERVICAL: Primary | ICD-10-CM

## 2025-08-04 PROCEDURE — 97140 MANUAL THERAPY 1/> REGIONS: CPT

## 2025-08-04 PROCEDURE — 97110 THERAPEUTIC EXERCISES: CPT

## 2025-08-04 PROCEDURE — 97162 PT EVAL MOD COMPLEX 30 MIN: CPT

## 2025-08-06 ENCOUNTER — PATIENT MESSAGE (OUTPATIENT)
Age: 40
End: 2025-08-06

## 2025-08-08 DIAGNOSIS — E66.813 CLASS 3 OBESITY: ICD-10-CM

## 2025-08-08 RX ORDER — TIRZEPATIDE 12.5 MG/.5ML
12.5 INJECTION, SOLUTION SUBCUTANEOUS WEEKLY
Qty: 2 ML | Refills: 0 | Status: SHIPPED | OUTPATIENT
Start: 2025-08-08 | End: 2025-09-05

## 2025-08-11 ENCOUNTER — TELEPHONE (OUTPATIENT)
Dept: ENDOCRINOLOGY | Facility: CLINIC | Age: 40
End: 2025-08-11

## 2025-08-17 DIAGNOSIS — Z91.09 ENVIRONMENTAL ALLERGIES: ICD-10-CM

## 2025-08-19 RX ORDER — LORATADINE 10 MG/1
10 TABLET ORAL DAILY
Qty: 30 TABLET | Refills: 5 | Status: SHIPPED | OUTPATIENT
Start: 2025-08-19

## 2025-08-22 ENCOUNTER — TELEPHONE (OUTPATIENT)
Age: 40
End: 2025-08-22